# Patient Record
Sex: MALE | Race: WHITE | NOT HISPANIC OR LATINO | Employment: OTHER | URBAN - METROPOLITAN AREA
[De-identification: names, ages, dates, MRNs, and addresses within clinical notes are randomized per-mention and may not be internally consistent; named-entity substitution may affect disease eponyms.]

---

## 2017-01-01 ENCOUNTER — TRANSFERRED RECORDS (OUTPATIENT)
Dept: HEALTH INFORMATION MANAGEMENT | Facility: CLINIC | Age: 62
End: 2017-01-01

## 2017-01-02 ENCOUNTER — TRANSFERRED RECORDS (OUTPATIENT)
Dept: HEALTH INFORMATION MANAGEMENT | Facility: CLINIC | Age: 62
End: 2017-01-02

## 2017-01-04 ENCOUNTER — TRANSFERRED RECORDS (OUTPATIENT)
Dept: HEALTH INFORMATION MANAGEMENT | Facility: CLINIC | Age: 62
End: 2017-01-04

## 2017-01-08 ENCOUNTER — TRANSFERRED RECORDS (OUTPATIENT)
Dept: HEALTH INFORMATION MANAGEMENT | Facility: CLINIC | Age: 62
End: 2017-01-08

## 2017-01-15 ENCOUNTER — TRANSFERRED RECORDS (OUTPATIENT)
Dept: HEALTH INFORMATION MANAGEMENT | Facility: CLINIC | Age: 62
End: 2017-01-15

## 2017-01-20 ENCOUNTER — TRANSFERRED RECORDS (OUTPATIENT)
Dept: HEALTH INFORMATION MANAGEMENT | Facility: CLINIC | Age: 62
End: 2017-01-20

## 2017-02-01 ENCOUNTER — APPOINTMENT (OUTPATIENT)
Dept: CT IMAGING | Facility: CLINIC | Age: 62
End: 2017-02-01
Attending: EMERGENCY MEDICINE
Payer: COMMERCIAL

## 2017-02-01 ENCOUNTER — HOSPITAL ENCOUNTER (EMERGENCY)
Facility: CLINIC | Age: 62
Discharge: HOME | End: 2017-02-01
Attending: EMERGENCY MEDICINE | Admitting: EMERGENCY MEDICINE
Payer: COMMERCIAL

## 2017-02-01 VITALS
TEMPERATURE: 98.2 F | HEART RATE: 83 BPM | BODY MASS INDEX: 29.75 KG/M2 | SYSTOLIC BLOOD PRESSURE: 139 MMHG | RESPIRATION RATE: 19 BRPM | DIASTOLIC BLOOD PRESSURE: 87 MMHG | OXYGEN SATURATION: 99 % | WEIGHT: 190 LBS

## 2017-02-01 DIAGNOSIS — G89.29 CHRONIC ABDOMINAL PAIN: ICD-10-CM

## 2017-02-01 DIAGNOSIS — R10.9 CHRONIC ABDOMINAL PAIN: ICD-10-CM

## 2017-02-01 DIAGNOSIS — Z86.39 HISTORY OF AMYLOIDOSIS: ICD-10-CM

## 2017-02-01 LAB
ABO + RH BLD: NORMAL
ABO + RH BLD: NORMAL
ALBUMIN SERPL-MCNC: 3.6 G/DL (ref 3.4–5)
ALBUMIN UR-MCNC: NEGATIVE MG/DL
ALP SERPL-CCNC: 89 U/L (ref 40–150)
ALT SERPL W P-5'-P-CCNC: 27 U/L (ref 0–70)
ANION GAP SERPL CALCULATED.3IONS-SCNC: 11 MMOL/L (ref 3–14)
APPEARANCE UR: CLEAR
AST SERPL W P-5'-P-CCNC: 12 U/L (ref 0–45)
BASOPHILS # BLD AUTO: 0.1 10E9/L (ref 0–0.2)
BASOPHILS NFR BLD AUTO: 0.5 %
BILIRUB SERPL-MCNC: 1 MG/DL (ref 0.2–1.3)
BILIRUB UR QL STRIP: NEGATIVE
BLD GP AB SCN SERPL QL: NORMAL
BLOOD BANK CMNT PATIENT-IMP: NORMAL
BUN SERPL-MCNC: 14 MG/DL (ref 7–30)
CALCIUM SERPL-MCNC: 8.6 MG/DL (ref 8.5–10.1)
CHLORIDE SERPL-SCNC: 99 MMOL/L (ref 94–109)
CO2 SERPL-SCNC: 22 MMOL/L (ref 20–32)
COLOR UR AUTO: YELLOW
CREAT SERPL-MCNC: 0.7 MG/DL (ref 0.66–1.25)
DIFFERENTIAL METHOD BLD: ABNORMAL
EOSINOPHIL # BLD AUTO: 0.2 10E9/L (ref 0–0.7)
EOSINOPHIL NFR BLD AUTO: 1.6 %
ERYTHROCYTE [DISTWIDTH] IN BLOOD BY AUTOMATED COUNT: 16.9 % (ref 10–15)
GFR SERPL CREATININE-BSD FRML MDRD: ABNORMAL ML/MIN/1.7M2
GLUCOSE SERPL-MCNC: 103 MG/DL (ref 70–99)
GLUCOSE UR STRIP-MCNC: NEGATIVE MG/DL
HCT VFR BLD AUTO: 31.3 % (ref 40–53)
HGB BLD-MCNC: 11.5 G/DL (ref 13.3–17.7)
HGB UR QL STRIP: NEGATIVE
IMM GRANULOCYTES # BLD: 0.1 10E9/L (ref 0–0.4)
IMM GRANULOCYTES NFR BLD: 0.4 %
INR PPP: 1.15 (ref 0.86–1.14)
KETONES UR STRIP-MCNC: NEGATIVE MG/DL
LACTATE BLD-SCNC: 1.1 MMOL/L (ref 0.7–2.1)
LEUKOCYTE ESTERASE UR QL STRIP: NEGATIVE
LIPASE SERPL-CCNC: 89 U/L (ref 73–393)
LYMPHOCYTES # BLD AUTO: 0.9 10E9/L (ref 0.8–5.3)
LYMPHOCYTES NFR BLD AUTO: 8.1 %
MCH RBC QN AUTO: 32.8 PG (ref 26.5–33)
MCHC RBC AUTO-ENTMCNC: 36.7 G/DL (ref 31.5–36.5)
MCV RBC AUTO: 89 FL (ref 78–100)
MONOCYTES # BLD AUTO: 1.5 10E9/L (ref 0–1.3)
MONOCYTES NFR BLD AUTO: 13.2 %
NEUTROPHILS # BLD AUTO: 8.7 10E9/L (ref 1.6–8.3)
NEUTROPHILS NFR BLD AUTO: 76.2 %
NITRATE UR QL: NEGATIVE
NRBC # BLD AUTO: 0 10*3/UL
NRBC BLD AUTO-RTO: 0 /100
PH UR STRIP: 7 PH (ref 5–7)
PLATELET # BLD AUTO: 106 10E9/L (ref 150–450)
POTASSIUM SERPL-SCNC: 3.7 MMOL/L (ref 3.4–5.3)
PROT SERPL-MCNC: 6.1 G/DL (ref 6.8–8.8)
RBC # BLD AUTO: 3.51 10E12/L (ref 4.4–5.9)
RBC #/AREA URNS AUTO: <1 /HPF (ref 0–2)
SODIUM SERPL-SCNC: 132 MMOL/L (ref 133–144)
SP GR UR STRIP: 1.01 (ref 1–1.03)
SPECIMEN EXP DATE BLD: NORMAL
URN SPEC COLLECT METH UR: NORMAL
UROBILINOGEN UR STRIP-MCNC: NORMAL MG/DL (ref 0–2)
WBC # BLD AUTO: 11.4 10E9/L (ref 4–11)
WBC #/AREA URNS AUTO: 1 /HPF (ref 0–2)

## 2017-02-01 PROCEDURE — 86901 BLOOD TYPING SEROLOGIC RH(D): CPT | Performed by: EMERGENCY MEDICINE

## 2017-02-01 PROCEDURE — 99285 EMERGENCY DEPT VISIT HI MDM: CPT | Mod: 25 | Performed by: EMERGENCY MEDICINE

## 2017-02-01 PROCEDURE — 86900 BLOOD TYPING SEROLOGIC ABO: CPT | Performed by: EMERGENCY MEDICINE

## 2017-02-01 PROCEDURE — 25000125 ZZHC RX 250: Performed by: EMERGENCY MEDICINE

## 2017-02-01 PROCEDURE — 96374 THER/PROPH/DIAG INJ IV PUSH: CPT | Mod: 59 | Performed by: EMERGENCY MEDICINE

## 2017-02-01 PROCEDURE — 25000128 H RX IP 250 OP 636: Performed by: EMERGENCY MEDICINE

## 2017-02-01 PROCEDURE — 87086 URINE CULTURE/COLONY COUNT: CPT | Performed by: EMERGENCY MEDICINE

## 2017-02-01 PROCEDURE — 96375 TX/PRO/DX INJ NEW DRUG ADDON: CPT | Mod: 59 | Performed by: EMERGENCY MEDICINE

## 2017-02-01 PROCEDURE — 96376 TX/PRO/DX INJ SAME DRUG ADON: CPT | Performed by: EMERGENCY MEDICINE

## 2017-02-01 PROCEDURE — 86850 RBC ANTIBODY SCREEN: CPT | Performed by: EMERGENCY MEDICINE

## 2017-02-01 PROCEDURE — 83690 ASSAY OF LIPASE: CPT | Performed by: EMERGENCY MEDICINE

## 2017-02-01 PROCEDURE — 25500064 ZZH RX 255 OP 636: Performed by: EMERGENCY MEDICINE

## 2017-02-01 PROCEDURE — 74177 CT ABD & PELVIS W/CONTRAST: CPT

## 2017-02-01 PROCEDURE — 99284 EMERGENCY DEPT VISIT MOD MDM: CPT | Mod: Z6 | Performed by: EMERGENCY MEDICINE

## 2017-02-01 PROCEDURE — 87040 BLOOD CULTURE FOR BACTERIA: CPT | Performed by: EMERGENCY MEDICINE

## 2017-02-01 PROCEDURE — 36415 COLL VENOUS BLD VENIPUNCTURE: CPT | Performed by: EMERGENCY MEDICINE

## 2017-02-01 PROCEDURE — 80053 COMPREHEN METABOLIC PANEL: CPT | Performed by: EMERGENCY MEDICINE

## 2017-02-01 PROCEDURE — 83605 ASSAY OF LACTIC ACID: CPT | Performed by: EMERGENCY MEDICINE

## 2017-02-01 PROCEDURE — 85025 COMPLETE CBC W/AUTO DIFF WBC: CPT | Performed by: EMERGENCY MEDICINE

## 2017-02-01 PROCEDURE — 81001 URINALYSIS AUTO W/SCOPE: CPT | Performed by: EMERGENCY MEDICINE

## 2017-02-01 PROCEDURE — 85610 PROTHROMBIN TIME: CPT | Performed by: EMERGENCY MEDICINE

## 2017-02-01 RX ORDER — ONDANSETRON 2 MG/ML
4 INJECTION INTRAMUSCULAR; INTRAVENOUS ONCE
Status: COMPLETED | OUTPATIENT
Start: 2017-02-01 | End: 2017-02-01

## 2017-02-01 RX ORDER — MORPHINE SULFATE 4 MG/ML
4 INJECTION, SOLUTION INTRAMUSCULAR; INTRAVENOUS ONCE
Status: COMPLETED | OUTPATIENT
Start: 2017-02-01 | End: 2017-02-01

## 2017-02-01 RX ORDER — IOPAMIDOL 755 MG/ML
100 INJECTION, SOLUTION INTRAVASCULAR ONCE
Status: COMPLETED | OUTPATIENT
Start: 2017-02-01 | End: 2017-02-01

## 2017-02-01 RX ADMIN — ONDANSETRON 4 MG: 2 INJECTION INTRAMUSCULAR; INTRAVENOUS at 18:34

## 2017-02-01 RX ADMIN — HYDROMORPHONE HYDROCHLORIDE 1 MG: 1 INJECTION, SOLUTION INTRAMUSCULAR; INTRAVENOUS; SUBCUTANEOUS at 20:19

## 2017-02-01 RX ADMIN — MORPHINE SULFATE 4 MG: 4 INJECTION, SOLUTION INTRAMUSCULAR; INTRAVENOUS at 18:36

## 2017-02-01 RX ADMIN — HYDROMORPHONE HYDROCHLORIDE 1 MG: 1 INJECTION, SOLUTION INTRAMUSCULAR; INTRAVENOUS; SUBCUTANEOUS at 19:17

## 2017-02-01 RX ADMIN — SODIUM CHLORIDE 63 ML: 9 INJECTION, SOLUTION INTRAVENOUS at 20:08

## 2017-02-01 RX ADMIN — SODIUM CHLORIDE 1000 ML: 9 INJECTION, SOLUTION INTRAVENOUS at 18:34

## 2017-02-01 RX ADMIN — IOPAMIDOL 100 ML: 755 INJECTION, SOLUTION INTRAVENOUS at 20:08

## 2017-02-01 ASSESSMENT — ENCOUNTER SYMPTOMS
NAUSEA: 1
VOMITING: 0
HEMATURIA: 0
SORE THROAT: 0
DIARRHEA: 1
DYSURIA: 0
BLOOD IN STOOL: 1
RHINORRHEA: 0
FEVER: 0
ABDOMINAL PAIN: 1
COUGH: 0

## 2017-02-01 NOTE — ED AVS SNAPSHOT
[facility], [place], Emergency Department    [address]    [facility] [address]    Phone:  [phone]    Fax:  [phone]                                       Parmjit Hernández   MRN: [id number]    Department:  [facility], Emergency Department   Date of Visit:  2/1/2017           After Visit Summary Signature Page     I have received my discharge instructions, and my questions have been answered. I have discussed any challenges I see with this plan with the nurse or doctor.    ..........................................................................................................................................  Patient/Patient Representative Signature      ..........................................................................................................................................  Patient Representative Print Name and Relationship to Patient    ..................................................               ................................................  Date                                            Time    ..........................................................................................................................................  Reviewed by Signature/Title    ...................................................              ..............................................  Date                                                            Time

## 2017-02-01 NOTE — ED PROVIDER NOTES
History     Chief Complaint   Patient presents with     Other     finished chemo therapy last week, + stomach pain, tarry stools; pt was advised to come to ED     HPI  Parmjit Hernández is a 61 year old male chronic abdominal pain secondary to systemic light chain amyloidosis (treated at [facility]), status-post cholecystectomy and umbilical hernia repair who presents with abdominal pain. The patient reports that he completed chemotherapy about 10 days ago at [facility] for his amyloidosis. He also reports that he had a stem cell transplant about a week ago. Over the past week, he has developed constant, crampy, right-sided abdominal pain with associated nausea and loose stool. He notes that he has had 1 episode of paste-like, dark tarry stool over the past week. He has never had this in the past. He notes that he has not been able to eat, even though he has an appetite, and has lost about 20 pounds over the past 10 days. He denies any history of ulcers. He reports a colonoscopy about 6 months ago and an endoscopy last summer that were negative. He denies fever, cough, cold, runny nose, sore throat, vomiting, dysuria, hematuria, nose or gum bleeding. He has been taking prochlorperazine at home for the nausea.    Record review shows hx of chronic abdominal pain and narcotic use.    Past Medical History   Diagnosis Date     Headache(784.0)      Obsessive-compulsive disorders      Other acquired absence of organ 94     Other specified viral warts      Pure hypercholesterolemia      Myalgia and myositis, unspecified      Hypertension 2007     Diabetes mellitus (H)      type 2     Pain in the abdomen      Allergic state      Amyloidosis (H)        Past Surgical History   Procedure Laterality Date     C nonspecific procedure  94     Cholecystectomy     Hernia repair, umbilical  2006     C nonspecific procedure  2000     repair deviated septum     Cholecystectomy  1995     lap qian     Colonoscopy  2012     hx polyps      Esophagoscopy, gastroscopy, duodenoscopy (egd), combined N/A 5/29/2015     Procedure: COMBINED ESOPHAGOSCOPY, GASTROSCOPY, DUODENOSCOPY (EGD), BIOPSY SINGLE OR MULTIPLE;  Surgeon: John Jacob MD;  Location:  GI     Bone marrow biopsy, bone specimen, needle/trocar N/A 6/8/2016     Procedure: BIOPSY BONE MARROW;  Surgeon: Nathan Agrawal MD;  Location:  GI       Family History   Problem Relation Age of Onset     CEREBROVASCULAR DISEASE Mother      C.A.D. Mother      Hypertension Mother      Alcohol/Drug Mother      Arthritis Mother      CEREBROVASCULAR DISEASE Maternal Grandmother      C.A.D. Maternal Grandmother      Alcohol/Drug Maternal Grandmother      C.A.D. Father      HEART DISEASE Father      Hypertension Father      Alcohol/Drug Father      Allergies Father      Circulatory Father      Depression Father      Respiratory Father      DIABETES Brother      Alcohol/Drug Paternal Grandfather      CEREBROVASCULAR DISEASE Paternal Grandfather      Allergies Sister      Depression Sister      Gynecology Sister      Depression Son      Psychotic Disorder Son      anxiety disorder     Depression Daughter      CEREBROVASCULAR DISEASE Maternal Grandfather      CEREBROVASCULAR DISEASE Paternal Grandmother      Coronary Artery Disease No family hx of      Hyperlipidemia No family hx of      Breast Cancer No family hx of      Colon Cancer No family hx of      Prostate Cancer No family hx of      Other Cancer No family hx of      Anxiety Disorder No family hx of      MENTAL ILLNESS No family hx of      Substance Abuse No family hx of      Anesthesia Reaction No family hx of      Asthma No family hx of      OSTEOPOROSIS No family hx of      Genetic Disorder No family hx of      Thyroid Disease No family hx of      Obesity No family hx of      Unknown/Adopted No family hx of        Social History   Substance Use Topics     Smoking status: Never Smoker      Smokeless tobacco: Never Used     Alcohol Use:  No      Comment: rarely     No current facility-administered medications for this encounter.     Current Outpatient Prescriptions   Medication     hydrOXYzine (VISTARIL) 25 MG capsule     atorvastatin (LIPITOR) 10 MG tablet     metFORMIN (GLUCOPHAGE-XR) 500 MG 24 hr tablet     lisinopril (PRINIVIL,ZESTRIL) 20 MG tablet     DEXAMETHASONE PO     oxyCODONE (ROXICODONE) 5 MG immediate release tablet     oxyCODONE (ROXICODONE) 5 MG immediate release tablet     OLANZapine (ZYPREXA) 5 MG tablet     order for DME     clonazePAM (KLONOPIN) 0.5 MG disintegrating tablet     zolpidem (AMBIEN) 5 MG tablet     bortezomib (VELCADE) 3.5 MG injection     UNKNOWN MED DOSAGE     traZODone (DESYREL) 100 MG tablet     lamoTRIgine (LAMICTAL) 200 MG tablet        Allergies   Allergen Reactions     Sulfa Drugs Swelling     Pt has taken  Taken sulfa drugs orally without trouble. He had problems with Sulfa eye drops. Eye swelled up     Victoza      Increased migraine frequency and severity      I have reviewed the Medications, Allergies, Past Medical and Surgical History, and Social History in the Epic system.    Review of Systems   Constitutional: Negative for fever.   HENT: Negative for mouth sores, nosebleeds, rhinorrhea and sore throat.    Respiratory: Negative for cough.    Gastrointestinal: Positive for nausea, abdominal pain, diarrhea (loose) and blood in stool (melena). Negative for vomiting.   Genitourinary: Negative for dysuria and hematuria.   All other systems reviewed and are negative.      Physical Exam   BP: (!) 147/103 mmHg  Pulse: 96  Temp: 95.4  F (35.2  C)  Resp: 16  Weight: 86.183 kg (190 lb)  SpO2: 99 %  Physical Exam   Constitutional: He is oriented to person, place, and time. He appears well-developed and well-nourished.   Lying on L side, in dark room, blanket covering head, appears to have intermittent facial tics   HENT:   Head: Normocephalic.   Mouth/Throat: Oropharynx is clear and moist.   Eyes: Pupils are equal,  round, and reactive to light.   Neck: Neck supple.   Cardiovascular: Normal rate, regular rhythm and normal heart sounds.  Exam reveals no gallop.    No murmur heard.  Pulmonary/Chest: Effort normal and breath sounds normal. No respiratory distress. He has no wheezes. He has no rales.   Abdominal: Soft. Bowel sounds are normal. He exhibits no distension. There is no tenderness. There is no rebound and no guarding.   Obese, soft, diffusely TTP globally without guarding or rebound. Normal bowel sounds   Neurological: He is alert and oriented to person, place, and time.   Skin: Skin is warm and dry.   Psychiatric:   anxious   Nursing note and vitals reviewed.      ED Course     Procedures     5:08 PM  The patient was seen and examined by Dr. Schultz in Room 2.               Critical Care time:  none               Results for orders placed or performed during the hospital encounter of 02/01/17 (from the past 24 hour(s))   CBC with platelets differential   Result Value Ref Range    WBC 11.4 (H) 4.0 - 11.0 10e9/L    RBC Count 3.51 (L) 4.4 - 5.9 10e12/L    Hemoglobin 11.5 (L) 13.3 - 17.7 g/dL    Hematocrit 31.3 (L) 40.0 - 53.0 %    MCV 89 78 - 100 fl    MCH 32.8 26.5 - 33.0 pg    MCHC 36.7 (H) 31.5 - 36.5 g/dL    RDW 16.9 (H) 10.0 - 15.0 %    Platelet Count 106 (L) 150 - 450 10e9/L    Diff Method Automated Method     % Neutrophils 76.2 %    % Lymphocytes 8.1 %    % Monocytes 13.2 %    % Eosinophils 1.6 %    % Basophils 0.5 %    % Immature Granulocytes 0.4 %    Nucleated RBCs 0 0 /100    Absolute Neutrophil 8.7 (H) 1.6 - 8.3 10e9/L    Absolute Lymphocytes 0.9 0.8 - 5.3 10e9/L    Absolute Monocytes 1.5 (H) 0.0 - 1.3 10e9/L    Absolute Eosinophils 0.2 0.0 - 0.7 10e9/L    Absolute Basophils 0.1 0.0 - 0.2 10e9/L    Abs Immature Granulocytes 0.1 0 - 0.4 10e9/L    Absolute Nucleated RBC 0.0    Comprehensive metabolic panel   Result Value Ref Range    Sodium 132 (L) 133 - 144 mmol/L    Potassium 3.7 3.4 - 5.3 mmol/L    Chloride 99  94 - 109 mmol/L    Carbon Dioxide 22 20 - 32 mmol/L    Anion Gap 11 3 - 14 mmol/L    Glucose 103 (H) 70 - 99 mg/dL    Urea Nitrogen 14 7 - 30 mg/dL    Creatinine 0.70 0.66 - 1.25 mg/dL    GFR Estimate >90  Non  GFR Calc   >60 mL/min/1.7m2    GFR Estimate If Black >90   GFR Calc   >60 mL/min/1.7m2    Calcium 8.6 8.5 - 10.1 mg/dL    Bilirubin Total 1.0 0.2 - 1.3 mg/dL    Albumin 3.6 3.4 - 5.0 g/dL    Protein Total 6.1 (L) 6.8 - 8.8 g/dL    Alkaline Phosphatase 89 40 - 150 U/L    ALT 27 0 - 70 U/L    AST 12 0 - 45 U/L   INR   Result Value Ref Range    INR 1.15 (H) 0.86 - 1.14   Lipase   Result Value Ref Range    Lipase 89 73 - 393 U/L   Lactic acid   Result Value Ref Range    Lactic Acid 1.1 0.7 - 2.1 mmol/L   Blood culture ONE site   Result Value Ref Range    Specimen Description Blood Left Hand     Special Requests Aerobic and anaerobic bottles received     Culture Micro Pending     Micro Report Status Pending    ABO/Rh type and screen   Result Value Ref Range    ABO A     RH(D)  Pos     Antibody Screen Neg     Test Valid Only At       [facility]    Specimen Expires 02/04/2017    UA with Microscopic   Result Value Ref Range    Color Urine Yellow     Appearance Urine Clear     Glucose Urine Negative NEG mg/dL    Bilirubin Urine Negative NEG    Ketones Urine Negative NEG mg/dL    Specific Gravity Urine 1.011 1.003 - 1.035    Blood Urine Negative NEG    pH Urine 7.0 5.0 - 7.0 pH    Protein Albumin Urine Negative NEG mg/dL    Urobilinogen mg/dL Normal 0.0 - 2.0 mg/dL    Nitrite Urine Negative NEG    Leukocyte Esterase Urine Negative NEG    Source Midstream Urine     WBC Urine 1 0 - 2 /HPF    RBC Urine <1 0 - 2 /HPF   Urine Culture Aerobic Bacterial   Result Value Ref Range    Specimen Description Midstream Urine     Special Requests Specimen received in preservative     Culture Micro Pending     Micro Report Status Pending    CT Abdomen Pelvis  w Contrast    Narrative    CT ABDOMEN AND PELVIS WITH CONTRAST   2/1/2017 8:08 PM      HISTORY: Abdominal pain and nausea. History of amyloidosis post stem  cell transplant a few weeks ago.    TECHNIQUE: CT abdomen and pelvis with intravenous contrast. Radiation  dose for this scan was reduced using automated exposure control,  adjustment of the mA and/or kV according to patient size, or iterative  reconstruction technique. 100 mL Isovue-370.     COMPARISON: None.    FINDINGS:  Abdomen: There is mild atelectasis and scarring at the lung bases. 0.4  cm indeterminate nodule right lower lobe laterally. The spleen is  enlarged measuring approximately 14.5 cm in length. The liver is  slightly low in attenuation diffusely consistent with fatty  infiltration. No focal lesion. The gallbladder is surgically absent.  The pancreas, adrenal glands and right kidney are normal in  appearance. There are three small cortical cysts in the left kidney.  There are several enlarged upper abdominal and retroperitoneal lymph  nodes. Lymph node in the jaguar hepatis measures 3.7 x 1.8 cm. There  are a few enlarged lymph nodes in the pelvis and inguinal regions  bilaterally. There is atherosclerotic calcification of aorta and its  branches. No aneurysm.    Pelvis: There are scattered colonic diverticula without acute  diverticulitis. No bowel obstruction or inflammation. The appendix is  within normal limits. Specifically, no evidence of typhlitis or  colitis. No free intraperitoneal gas or fluid. Degenerative disease in  the spine.      Impression    IMPRESSION:  1. No bowel obstruction or inflammation.  2. Multiple mildly enlarged abdominal and pelvic lymph nodes.  3. Splenomegaly.    THUY POWERS MD         Assessments & Plan (with Medical Decision Making)   This is a 61 year old male, who primarily gets most of his specialty care at the [facility], who presents to the ED today with abdominal pain and report of dark stools.  Patient  has a history of amyloidosis and evidently received a stem cell transplant at [facility] sometime in January.  He is unclear about exactly when.  He states that since he was discharged he has been noticing increasing crampy abdominal pain with some nausea but no vomiting.    He has some records through the [facility] system but most of his oncology care has all been outside of our system and I do not have access to [facility] records at this point.  Certainly in a patient who is so recently status post stem cell transplant, need to consider the possibility of significant complications.  He reports he did finish chemotherapy just before that started, but can t tell me what chemo drugs he was on.  Here in the emergency department he is a bit hypertensive with a blood pressure 147/103, pulse and respiratory rate were normal and temp was 95.6F. He is lying in a darkened room, on his left side with a blanket covering his head.  He does have some tenderness to palpation somewhat generally over the entire abdomen, not really complaining of any specific tenderness to palpation.    Multiple etiologies of his symptoms are possible.  In somebody who is so recently status post stem cell transplant, need to consider the possibility of typhlitis.  Any other intra-abdominal process is possible including hepatitis, pancreatitis, gastritis, peptic ulcer disease.  Unclear to me if this  dark tarry stool  really represents blood, I wish to avoid a rectal exam at this point as I do not yet know what his white count is and if he is neutropenic, wish to avoid rectal exams.    We did establish IV access and we did draw blood for laboratory analysis. CBC actually looks quite good for a patient who had a stem cell transplant a few weeks ago.  White count is 11.4, hemoglobin is 11.5, platelet count is 106,000.  Comprehensive metabolic panel shows a sodium of 132, otherwise within normal limits, protein is down at 6.1 which is not unexpected.  INR  is 1.15 and lipase is normal at 89, lactate normal at 1.1.  UA shows no evidence of infection. Urine and blood cultures were obtained.  We did give the patient IV fluids as well as a dose of Zofran and a dose of morphine.  Abdominal CT scan was done and this shows no evidence of bowel obstruction or inflammation, specifically no sign of typhlitis or colitis.  There is multiple mildly enlarged abdominal and pelvic lymph nodes as well as splenomegaly, not at all uncommon for amyloidosis.    Further record review through our system shows that he does have a history of chronic abdominal pain and chronic narcotic use.  He states that right now he is not any narcotics whatsoever.    I suspect that this is the patient s chronic abdominal pain.  As previously mentioned he has been noted to have chronic pain for quite a while.  He told me he wasn t taking anything for pain at home, this turns out to be untrue as he filled a prescription for OxyContin about a week ago, 10 mg tablets, 60 tablets were dispensed.  Looking at his [place] Prescription Monitoring Database, 65 prescriptions for controlled substances in the past 12 months from 13 providers and 9 different pharmacies.  I did tell him that I am not comfortable managing chronic pain from the Emergency Department and as his numbers look quite good, I do think it is reasonable to discharge him, I would suggest that he contact his clinic in the morning to discuss symptoms but at this point I see no reason that he would need to be admitted and urgently transferred for care.  Patient continued to repeatedly bargain with me for narcotics.  He has received 3 doses of IV narcotics in the Emergency Department.  Again, without an acute emergency present I do not think it is appropriate for us to give additional narcotics in the ED and I would strongly recommend that he follow up with his primary clinic.  Patient verbalizes understanding.    I have reviewed the nursing  notes.    I have reviewed the findings, diagnosis, plan and need for follow up with the patient.  This part of the document was transcribed by Marivel De La Fuente for Mariah Schultz MD.  Discharge Medication List as of 2/1/2017  9:04 PM          Final diagnoses:   Chronic abdominal pain   History of amyloidosis     I, Andrew Huddleston, am serving as a trained medical scribe to document services personally performed by Mariah Schultz MD, based on the provider's statements to me.   I, Mariah Schultz MD, was physically present and have reviewed and verified the accuracy of this note documented by Andrew Huddleston.     2/1/2017   [facility], [place], EMERGENCY DEPARTMENT      Mariah Schultz MD  02/02/17 0207

## 2017-02-01 NOTE — ED AVS SNAPSHOT
[facility], Emergency Department    [address]    [place] [address]    Phone:  [phone]    Fax:  [phone]                                       Parmjit Hernández   MRN: [id number]    Department:  [facility], Emergency Department   Date of Visit:  2/1/2017           Patient Information     Date Of Birth          1955        Your diagnoses for this visit were:     Chronic abdominal pain     History of amyloidosis        You were seen by Mariah Schultz MD.        Discharge Instructions       You have been seen in the ER for your chronic abdominal pain.  We have done extensive testing here in the ER and your blood and urine tests look quite good.  Your CT scan shows some lymph nodes, which is not at all unexpected.  We recommend that you call your clinic and discuss your symptoms with them.  Continue taking your regular medications as directed.    Discharge References/Attachments     CHRONIC PAIN (ENGLISH)      24 Hour Appointment Hotline       To make an appointment at any [facility] clinic, call [phone] ([phone]). If you don't have a family doctor or clinic, we will help you find one. [facility] clinics are conveniently located to serve the needs of you and your family.             Review of your medicines      Our records show that you are taking the medicines listed below. If these are incorrect, please call your family doctor or clinic.        Dose / Directions Last dose taken    atorvastatin 10 MG tablet   Commonly known as:  LIPITOR   Quantity:  90 tablet        TAKE ONE TABLET BY MOUTH ONE TIME DAILY   Refills:  2        clonazePAM 0.5 MG ODT tab   Commonly known as:  klonoPIN   Quantity:  10 tablet        2 at hs   Refills:  0        DEXAMETHASONE PO        Take by mouth once a week   Refills:  0        hydrOXYzine 25 MG capsule   Commonly known as:  VISTARIL   Dose:  25 mg   Quantity:  50 capsule        Take 1 capsule (25 mg) by mouth 3 times daily as needed for  itching   Refills:  0        lamoTRIgine 200 MG tablet   Commonly known as:  LaMICtal   Dose:  100 mg   Quantity:  30 tablet        Take 100 mg by mouth 2 times daily   Refills:  1        lisinopril 20 MG tablet   Commonly known as:  PRINIVIL/ZESTRIL   Quantity:  90 tablet        TAKE ONE TABLET BY MOUTH ONE TIME DAILY   Refills:  3        metFORMIN 500 MG 24 hr tablet   Commonly known as:  GLUCOPHAGE-XR   Quantity:  360 tablet        TAKE FOUR TABLETS BY MOUTH DAILY   Refills:  PRN        OLANZapine 5 MG tablet   Commonly known as:  zyPREXA   Quantity:  49 tablet        15mg at dinner time daily   Refills:  0        order for DME   Quantity:  1 Device        Equipment being ordered: CPAP   Refills:  0        * oxyCODONE 5 MG IR tablet   Commonly known as:  ROXICODONE   Quantity:  70 tablet        1-2 q 6 hrs prn pain. Maximum of 10 a day   Refills:  0        * oxyCODONE 5 MG IR tablet   Commonly known as:  ROXICODONE   Quantity:  70 tablet        1 q 4 hr prn pain up to 10 a day   Refills:  0        traZODone 100 MG tablet   Commonly known as:  DESYREL   Dose:  200 mg   Quantity:  90 tablet        Take 2 tablets (200 mg) by mouth At Bedtime   Refills:  3        UNKNOWN MED DOSAGE   Indication:  chemotherapeutic medication weekly - 12 pills        Refills:  0        VELCADE 3.5 MG injection   Quantity:  3 each   Generic drug:  bortezomib        Inject into the vein once   Refills:  0        zolpidem 5 MG tablet   Commonly known as:  AMBIEN   Dose:  5 mg   Quantity:  14 tablet        Take 1 tablet (5 mg) by mouth nightly as needed for sleep   Refills:  0        * Notice:  This list has 2 medication(s) that are the same as other medications prescribed for you. Read the directions carefully, and ask your doctor or other care provider to review them with you.            Procedures and tests performed during your visit     ABO/Rh type and screen    Blood culture ONE site    CBC with platelets differential    CT Abdomen  Pelvis w Contrast    Comprehensive metabolic panel    INR    Lactic acid    Lipase    Pulse oximetry    UA with Microscopic    Urine Culture Aerobic Bacterial      Orders Needing Specimen Collection     None      Pending Results     Date and Time Order Name Status Description    2/1/2017 1723 CT Abdomen Pelvis w Contrast Preliminary     2/1/2017 1722 Urine Culture Aerobic Bacterial In process     2/1/2017 1722 Blood culture ONE site In process             Pending Culture Results     Date and Time Order Name Status Description    2/1/2017 1722 Urine Culture Aerobic Bacterial In process     2/1/2017 1722 Blood culture ONE site In process             Thank you for choosing [facility]       Thank you for choosing [facility] for your care. Our goal is always to provide you with excellent care. Hearing back from our patients is one way we can continue to improve our services. Please take a few minutes to complete the written survey that you may receive in the mail after you visit with us. Thank you!        [employer] Information     [employer] gives you secure access to your electronic health record. If you see a primary care provider, you can also send messages to your care team and make appointments. If you have questions, please call your primary care clinic.  If you do not have a primary care provider, please call [phone] and they will assist you.        Care EveryWhere ID     This is your Care EveryWhere ID. This could be used by other organizations to access your [facility] medical records  [id number]        After Visit Summary       This is your record. Keep this with you and show to your community pharmacist(s) and doctor(s) at your next visit.

## 2017-02-02 LAB
BACTERIA SPEC CULT: NO GROWTH
Lab: NORMAL
MICRO REPORT STATUS: NORMAL
SPECIMEN SOURCE: NORMAL

## 2017-02-07 LAB
BACTERIA SPEC CULT: NO GROWTH
Lab: NORMAL
MICRO REPORT STATUS: NORMAL
SPECIMEN SOURCE: NORMAL

## 2017-02-14 ENCOUNTER — TRANSFERRED RECORDS (OUTPATIENT)
Dept: HEALTH INFORMATION MANAGEMENT | Facility: CLINIC | Age: 62
End: 2017-02-14

## 2017-02-19 ENCOUNTER — HOSPITAL ENCOUNTER (EMERGENCY)
Facility: CLINIC | Age: 62
Discharge: HOME | End: 2017-02-19
Attending: INTERNAL MEDICINE | Admitting: INTERNAL MEDICINE
Payer: COMMERCIAL

## 2017-02-19 ENCOUNTER — APPOINTMENT (OUTPATIENT)
Dept: GENERAL RADIOLOGY | Facility: CLINIC | Age: 62
End: 2017-02-19
Attending: INTERNAL MEDICINE
Payer: COMMERCIAL

## 2017-02-19 VITALS
TEMPERATURE: 98.1 F | HEIGHT: 67 IN | DIASTOLIC BLOOD PRESSURE: 100 MMHG | SYSTOLIC BLOOD PRESSURE: 153 MMHG | WEIGHT: 195 LBS | HEART RATE: 80 BPM | RESPIRATION RATE: 18 BRPM | OXYGEN SATURATION: 100 % | BODY MASS INDEX: 30.61 KG/M2

## 2017-02-19 DIAGNOSIS — E85.89 OTHER AMYLOIDOSIS (H): ICD-10-CM

## 2017-02-19 DIAGNOSIS — R10.9 CHRONIC ABDOMINAL PAIN: ICD-10-CM

## 2017-02-19 DIAGNOSIS — G89.29 CHRONIC ABDOMINAL PAIN: ICD-10-CM

## 2017-02-19 LAB
ALBUMIN SERPL-MCNC: 3.8 G/DL (ref 3.4–5)
ALBUMIN UR-MCNC: NEGATIVE MG/DL
ALP SERPL-CCNC: 107 U/L (ref 40–150)
ALT SERPL W P-5'-P-CCNC: 31 U/L (ref 0–70)
AMPHETAMINES UR QL SCN: NORMAL
ANION GAP SERPL CALCULATED.3IONS-SCNC: 13 MMOL/L (ref 3–14)
APPEARANCE UR: CLEAR
AST SERPL W P-5'-P-CCNC: 16 U/L (ref 0–45)
BARBITURATES UR QL: NORMAL
BASOPHILS # BLD AUTO: 0 10E9/L (ref 0–0.2)
BASOPHILS NFR BLD AUTO: 0.1 %
BENZODIAZ UR QL: NORMAL
BILIRUB SERPL-MCNC: 1.5 MG/DL (ref 0.2–1.3)
BILIRUB UR QL STRIP: NEGATIVE
BUN SERPL-MCNC: 10 MG/DL (ref 7–30)
CALCIUM SERPL-MCNC: 9.1 MG/DL (ref 8.5–10.1)
CANNABINOIDS UR QL SCN: NORMAL
CHLORIDE SERPL-SCNC: 99 MMOL/L (ref 94–109)
CO2 SERPL-SCNC: 22 MMOL/L (ref 20–32)
COCAINE UR QL: NORMAL
COLOR UR AUTO: ABNORMAL
CREAT SERPL-MCNC: 0.69 MG/DL (ref 0.66–1.25)
CRP SERPL-MCNC: 8.9 MG/L (ref 0–8)
DIFFERENTIAL METHOD BLD: ABNORMAL
EOSINOPHIL # BLD AUTO: 0.1 10E9/L (ref 0–0.7)
EOSINOPHIL NFR BLD AUTO: 1.1 %
ERYTHROCYTE [DISTWIDTH] IN BLOOD BY AUTOMATED COUNT: 16.1 % (ref 10–15)
ERYTHROCYTE [SEDIMENTATION RATE] IN BLOOD BY WESTERGREN METHOD: 16 MM/H (ref 0–20)
ETHANOL UR QL SCN: NORMAL
GFR SERPL CREATININE-BSD FRML MDRD: ABNORMAL ML/MIN/1.7M2
GLUCOSE BLDC GLUCOMTR-MCNC: 126 MG/DL (ref 70–99)
GLUCOSE SERPL-MCNC: 108 MG/DL (ref 70–99)
GLUCOSE UR STRIP-MCNC: 30 MG/DL
HCT VFR BLD AUTO: 35.4 % (ref 40–53)
HGB BLD-MCNC: 12.8 G/DL (ref 13.3–17.7)
HGB UR QL STRIP: NEGATIVE
IMM GRANULOCYTES # BLD: 0 10E9/L (ref 0–0.4)
IMM GRANULOCYTES NFR BLD: 0.2 %
INR PPP: 1.08 (ref 0.86–1.14)
KETONES UR STRIP-MCNC: 10 MG/DL
LEUKOCYTE ESTERASE UR QL STRIP: NEGATIVE
LIPASE SERPL-CCNC: 108 U/L (ref 73–393)
LYMPHOCYTES # BLD AUTO: 0.8 10E9/L (ref 0.8–5.3)
LYMPHOCYTES NFR BLD AUTO: 9.5 %
MCH RBC QN AUTO: 32.2 PG (ref 26.5–33)
MCHC RBC AUTO-ENTMCNC: 36.2 G/DL (ref 31.5–36.5)
MCV RBC AUTO: 89 FL (ref 78–100)
MONOCYTES # BLD AUTO: 0.3 10E9/L (ref 0–1.3)
MONOCYTES NFR BLD AUTO: 4.2 %
NEUTROPHILS # BLD AUTO: 6.8 10E9/L (ref 1.6–8.3)
NEUTROPHILS NFR BLD AUTO: 84.9 %
NITRATE UR QL: NEGATIVE
NRBC # BLD AUTO: 0 10*3/UL
NRBC BLD AUTO-RTO: 0 /100
OPIATES UR QL SCN: NORMAL
PH UR STRIP: 8 PH (ref 5–7)
PLATELET # BLD AUTO: 87 10E9/L (ref 150–450)
POTASSIUM SERPL-SCNC: 3.6 MMOL/L (ref 3.4–5.3)
PROT SERPL-MCNC: 6.6 G/DL (ref 6.8–8.8)
RBC # BLD AUTO: 3.97 10E12/L (ref 4.4–5.9)
RBC #/AREA URNS AUTO: <1 /HPF (ref 0–2)
SODIUM SERPL-SCNC: 134 MMOL/L (ref 133–144)
SP GR UR STRIP: 1.01 (ref 1–1.03)
URN SPEC COLLECT METH UR: ABNORMAL
UROBILINOGEN UR STRIP-MCNC: NORMAL MG/DL (ref 0–2)
WBC # BLD AUTO: 8 10E9/L (ref 4–11)
WBC #/AREA URNS AUTO: <1 /HPF (ref 0–2)

## 2017-02-19 PROCEDURE — 85652 RBC SED RATE AUTOMATED: CPT | Performed by: INTERNAL MEDICINE

## 2017-02-19 PROCEDURE — 86140 C-REACTIVE PROTEIN: CPT | Performed by: INTERNAL MEDICINE

## 2017-02-19 PROCEDURE — 99284 EMERGENCY DEPT VISIT MOD MDM: CPT | Mod: 25

## 2017-02-19 PROCEDURE — 80307 DRUG TEST PRSMV CHEM ANLYZR: CPT | Performed by: INTERNAL MEDICINE

## 2017-02-19 PROCEDURE — 85610 PROTHROMBIN TIME: CPT | Performed by: INTERNAL MEDICINE

## 2017-02-19 PROCEDURE — 80053 COMPREHEN METABOLIC PANEL: CPT | Performed by: INTERNAL MEDICINE

## 2017-02-19 PROCEDURE — 99284 EMERGENCY DEPT VISIT MOD MDM: CPT | Mod: Z6 | Performed by: INTERNAL MEDICINE

## 2017-02-19 PROCEDURE — 96375 TX/PRO/DX INJ NEW DRUG ADDON: CPT

## 2017-02-19 PROCEDURE — 80320 DRUG SCREEN QUANTALCOHOLS: CPT | Performed by: INTERNAL MEDICINE

## 2017-02-19 PROCEDURE — 81003 URINALYSIS AUTO W/O SCOPE: CPT | Performed by: INTERNAL MEDICINE

## 2017-02-19 PROCEDURE — 85025 COMPLETE CBC W/AUTO DIFF WBC: CPT | Performed by: INTERNAL MEDICINE

## 2017-02-19 PROCEDURE — 25000128 H RX IP 250 OP 636: Performed by: INTERNAL MEDICINE

## 2017-02-19 PROCEDURE — 00000146 ZZHCL STATISTIC GLUCOSE BY METER IP

## 2017-02-19 PROCEDURE — 74020 XR ABDOMEN 2 VW: CPT

## 2017-02-19 PROCEDURE — 83690 ASSAY OF LIPASE: CPT | Performed by: INTERNAL MEDICINE

## 2017-02-19 PROCEDURE — 96374 THER/PROPH/DIAG INJ IV PUSH: CPT

## 2017-02-19 PROCEDURE — 96361 HYDRATE IV INFUSION ADD-ON: CPT | Mod: 59

## 2017-02-19 RX ORDER — METOCLOPRAMIDE HYDROCHLORIDE 5 MG/ML
10 INJECTION INTRAMUSCULAR; INTRAVENOUS ONCE
Status: COMPLETED | OUTPATIENT
Start: 2017-02-19 | End: 2017-02-19

## 2017-02-19 RX ORDER — KETOROLAC TROMETHAMINE 30 MG/ML
30 INJECTION, SOLUTION INTRAMUSCULAR; INTRAVENOUS ONCE
Status: COMPLETED | OUTPATIENT
Start: 2017-02-19 | End: 2017-02-19

## 2017-02-19 RX ADMIN — METOCLOPRAMIDE 10 MG: 5 INJECTION, SOLUTION INTRAMUSCULAR; INTRAVENOUS at 07:32

## 2017-02-19 RX ADMIN — KETOROLAC TROMETHAMINE 30 MG: 30 INJECTION, SOLUTION INTRAMUSCULAR at 07:32

## 2017-02-19 RX ADMIN — SODIUM CHLORIDE 1000 ML: 9 INJECTION, SOLUTION INTRAVENOUS at 07:31

## 2017-02-19 ASSESSMENT — ENCOUNTER SYMPTOMS
FEVER: 0
SHORTNESS OF BREATH: 0
ABDOMINAL PAIN: 1

## 2017-02-19 NOTE — DISCHARGE INSTRUCTIONS
Chronic Pain  Pain serves an important role. It lets you know something is wrong that needs your attention. When the body heals, pain normally goes away.  When pain lasts longer than six months, it is called  chronic  pain. This is pain that is present even after the body has healed. Chronic pain can cause mood problems and get in the way of your relationships and your daily life.  A number of conditions can cause chronic pain. Some of the more common include:    Previous surgery    An old injury    Infection    Diseases such as diabetes    Nerve damage    Back injury    Arthritis    Migraine or other headaches    Fibromyalgia    Cancer  Depression and stress can make chronic pain symptoms worse. In some cases, a cause for the pain cannot be found.   Treatment  Treatment can greatly reduce pain. In many cases, pain can become less severe, occur less often, and interfere less with your daily life. Chronic pain is often treated with a combination of medicines, therapies, and lifestyle changes. You will work closely with your healthcare provider to find a treatment plan that works best for you.    Ask your healthcare provider for a referral to a pain management specialty center. These can provide the most recent and proven pain management strategies, along with emotional support and comprehensive services.    Several different types of medicines may be prescribed for chronic pain. Work with your healthcare provider to develop a medicine plan that helps manage your pain.    Physical therapy can be very effective in helping reduce certain types of chronic pain.    Occupational therapy teaches you how to do routine tasks of daily living in ways that lessen your discomfort.    Psychological therapy can help you cope better with stress and pain.    Other therapies such as meditation, yoga, biofeedback, massage, and acupuncture can also help manage chronic pain.    Changing certain habits can help reduce chronic pain. They  include:    Eating healthy    Developing an exercise routine    Getting enough sleep at night    Stopping smoking and limiting alcohol use    Losing excess weight  Follow-up care  Follow up with your healthcare provider as advised. Let your healthcare provider know if your current treatment plan is working or if changes are needed.  Resources  For more information, contact:    [employer] [place] [employer] www.achenet.org    American Chronic Pain Association www.theacpa.org 255-878-3311    [id number] [employer]. [address] [place] [address]. All rights reserved. This information is not intended as a substitute for professional medical care. Always follow your healthcare professional's instructions.          * Abdominal Pain,Uncertain Cause [Male]  Based on your visit today, the exact cause of your abdominal (stomach) pain is not clear. Your exam and tests do not indicate a dangerous cause at this time. However, the signs of a serious problem may take more time to appear. Although your exam was reassuring today, sometimes early in the course of many conditions, exam and lab tests can appear normal. Therefore, it is important for you to watch for any new symptoms or worsening of your condition.  Causes:  It may not be obvious what caused your symptoms. Pay attention to things that do seem to make your symptoms worse or better and discuss this with your doctor when you follow up.  Diagnosis:  The evaluation of abdominal pain in the emergency department may only require an exam by the doctor or it may include blood, urine or imaging studies, depending on many factors. Sometimes exams and tests can identify a cause but in many cases, a clear cause is not found. Further testing at follow up visits may help to suggest a clear diagnosis.  Home Care:    Rest as much as possible until your next exam.    Try to avoid any medications (unless otherwise directed by your doctor), foods,  activities, or other factors that you may have contributed to your symptoms.    Try to eat foods that you know that you have tolerated well in the past. Certain diets may be recommended for some conditions that cause abdominal pain. However, since the cause of your symptoms may not be clear, discuss your diet more with your primary care provider or specialist for further recommendations.     Eating several small meals per day as opposed to 2 or 3 larger meals may help.    Monitor closely for anything that may make your symptoms worse or better. Pay close attention to symptoms below that may indicate worsening of your condition.  Follow Up And Precautions:  See your doctor or this facility as instructed (or sooner, if your symptoms are not improving). In some cases, you may need more testing.  Contact Your Doctor Or Seek Medical Attention  if any of the following occur:    Pain is becoming worse    You are unable to take your medications because of too much vomiting    Swelling of the abdomen    Fever of 101 F (38.3 C) or higher, or as directed by your health care provider    Blood in vomit or bowel movements (dark red or black color)    Jaundice (yellow color of eyes and skin)    New onset of weakness, dizziness or fainting    New onset of chest, arm, back, neck or jaw pain    [phone] [address], [place] [address]. All rights reserved. This information is not intended as a substitute for professional medical care. Always follow your healthcare professional's instructions.      Please make an appointment to follow up with [facility], Your Primary Care Provider and Pain Clinic (phone: [phone]) as soon as possible even if entirely better.

## 2017-02-19 NOTE — ED PROVIDER NOTES
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 The patient presents with another episode of abdominal pain that began a few days ago. Denies any fevers, nausea, vomiting, diarrhea. He was seen by Dr. Schultz about a week ago with very similar symptoms, at which time he had labs and CT (all unremarkable) however he was noted to have many narcotic prescriptions that he did not disclose and was informed that we do not feel comfortable treating chronic abdominal pain with narcotic prescriptions. He initially stated his primary care provider is Dr. Tobin but interestingly Epic chart notes Dr. Tobin no longer prescribes any narcotics as of 11/2016.     I have reviewed the Medications, Allergies, Past Medical and Surgical History, and Social History in the [facility] system.  Past Medical History   Diagnosis Date     Allergic state      Amyloidosis (H)      Diabetes mellitus (H)      type 2     Headache(784.0)      Hypertension 2007     Myalgia and myositis, unspecified      Obsessive-compulsive disorders      Other acquired absence of organ 94     Other specified viral warts      Pain in the abdomen      Pure hypercholesterolemia        Past Surgical History   Procedure Laterality Date     C nonspecific procedure  94     Cholecystectomy     Hernia repair, umbilical  2006     C nonspecific procedure  2000     repair deviated septum     Cholecystectomy  1995     lap qian     Colonoscopy  2012     hx polyps     Esophagoscopy, gastroscopy, duodenoscopy (egd), combined N/A 5/29/2015     Procedure: COMBINED ESOPHAGOSCOPY, GASTROSCOPY, DUODENOSCOPY (EGD),  BIOPSY SINGLE OR MULTIPLE;  Surgeon: John Jacob MD;  Location:  GI     Bone marrow biopsy, bone specimen, needle/trocar N/A 6/8/2016     Procedure: BIOPSY BONE MARROW;  Surgeon: Nathan Agrawal MD;  Location:  GI       Family History   Problem Relation Age of Onset     CEREBROVASCULAR DISEASE Mother      C.A.D. Mother      Hypertension Mother      Alcohol/Drug Mother      Arthritis Mother      CEREBROVASCULAR DISEASE Maternal Grandmother      C.A.D. Maternal Grandmother      Alcohol/Drug Maternal Grandmother      C.A.D. Father      HEART DISEASE Father      Hypertension Father      Alcohol/Drug Father      Allergies Father      Circulatory Father      Depression Father      Respiratory Father      DIABETES Brother      Alcohol/Drug Paternal Grandfather      CEREBROVASCULAR DISEASE Paternal Grandfather      Allergies Sister      Depression Sister      Gynecology Sister      Depression Son      Psychotic Disorder Son      anxiety disorder     Depression Daughter      CEREBROVASCULAR DISEASE Maternal Grandfather      CEREBROVASCULAR DISEASE Paternal Grandmother      Coronary Artery Disease No family hx of      Hyperlipidemia No family hx of      Breast Cancer No family hx of      Colon Cancer No family hx of      Prostate Cancer No family hx of      Other Cancer No family hx of      Anxiety Disorder No family hx of      MENTAL ILLNESS No family hx of      Substance Abuse No family hx of      Anesthesia Reaction No family hx of      Asthma No family hx of      OSTEOPOROSIS No family hx of      Genetic Disorder No family hx of      Thyroid Disease No family hx of      Obesity No family hx of      Unknown/Adopted No family hx of        Social History   Substance Use Topics     Smoking status: Never Smoker     Smokeless tobacco: Never Used     Alcohol use No      Comment: rarely       No current facility-administered medications for this encounter.      Current Outpatient Prescriptions  "  Medication     order for DME     atorvastatin (LIPITOR) 10 MG tablet     metFORMIN (GLUCOPHAGE-XR) 500 MG 24 hr tablet     lisinopril (PRINIVIL,ZESTRIL) 20 MG tablet     oxyCODONE (ROXICODONE) 5 MG immediate release tablet     oxyCODONE (ROXICODONE) 5 MG immediate release tablet     UNKNOWN MED DOSAGE          Allergies   Allergen Reactions     Sulfa Drugs Swelling     Pt has taken  Taken sulfa drugs orally without trouble. He had problems with Sulfa eye drops. Eye swelled up     Victoza      Increased migraine frequency and severity      Review of Systems   Constitutional: Negative for fever.   Respiratory: Negative for shortness of breath.    Cardiovascular: Negative for chest pain.   Gastrointestinal: Positive for abdominal pain.   All other systems reviewed and are negative.     ROS: 10 point ROS neg other than the symptoms noted above in the HPI.   Physical Exam   BP: (!) 159/112  Pulse: 80  Heart Rate: 72  Temp: 97.7  F (36.5  C)  Resp: 18  Height: 170.2 cm (5' 7\")  Weight: 88.5 kg (195 lb)  SpO2: 98 %  Physical Exam   Constitutional: He is oriented to person, place, and time. No distress.   HENT:   Head: Atraumatic.   Mouth/Throat: Oropharynx is clear and moist. No oropharyngeal exudate.   Eyes: Pupils are equal, round, and reactive to light. No scleral icterus.   Neck: Neck supple. No JVD present.   Cardiovascular: Normal rate, normal heart sounds and intact distal pulses.  Exam reveals no gallop and no friction rub.    No murmur heard.  Pulmonary/Chest: Effort normal and breath sounds normal. No respiratory distress. He has no wheezes. He has no rales. He exhibits no tenderness.   Abdominal: Soft. Bowel sounds are normal. He exhibits no distension and no mass. There is no tenderness. There is no rebound and no guarding.   Musculoskeletal: He exhibits no edema or tenderness.   Neurological: He is alert and oriented to person, place, and time. No cranial nerve deficit. Coordination normal.   Skin: Skin is " warm. No rash noted. He is not diaphoretic.       ED Course     ED Course     Procedures        Results for orders placed or performed during the hospital encounter of 02/19/17 (from the past 24 hour(s))   Glucose by meter     Status: Abnormal    Collection Time: 02/19/17  6:59 AM   Result Value Ref Range    Glucose 126 (H) 70 - 99 mg/dL   CBC with platelets differential     Status: Abnormal    Collection Time: 02/19/17  7:34 AM   Result Value Ref Range    WBC 8.0 4.0 - 11.0 10e9/L    RBC Count 3.97 (L) 4.4 - 5.9 10e12/L    Hemoglobin 12.8 (L) 13.3 - 17.7 g/dL    Hematocrit 35.4 (L) 40.0 - 53.0 %    MCV 89 78 - 100 fl    MCH 32.2 26.5 - 33.0 pg    MCHC 36.2 31.5 - 36.5 g/dL    RDW 16.1 (H) 10.0 - 15.0 %    Platelet Count 87 (L) 150 - 450 10e9/L    Diff Method Automated Method     % Neutrophils 84.9 %    % Lymphocytes 9.5 %    % Monocytes 4.2 %    % Eosinophils 1.1 %    % Basophils 0.1 %    % Immature Granulocytes 0.2 %    Nucleated RBCs 0 0 /100    Absolute Neutrophil 6.8 1.6 - 8.3 10e9/L    Absolute Lymphocytes 0.8 0.8 - 5.3 10e9/L    Absolute Monocytes 0.3 0.0 - 1.3 10e9/L    Absolute Eosinophils 0.1 0.0 - 0.7 10e9/L    Absolute Basophils 0.0 0.0 - 0.2 10e9/L    Abs Immature Granulocytes 0.0 0 - 0.4 10e9/L    Absolute Nucleated RBC 0.0    INR     Status: None    Collection Time: 02/19/17  7:34 AM   Result Value Ref Range    INR 1.08 0.86 - 1.14   Comprehensive metabolic panel     Status: Abnormal    Collection Time: 02/19/17  7:34 AM   Result Value Ref Range    Sodium 134 133 - 144 mmol/L    Potassium 3.6 3.4 - 5.3 mmol/L    Chloride 99 94 - 109 mmol/L    Carbon Dioxide 22 20 - 32 mmol/L    Anion Gap 13 3 - 14 mmol/L    Glucose 108 (H) 70 - 99 mg/dL    Urea Nitrogen 10 7 - 30 mg/dL    Creatinine 0.69 0.66 - 1.25 mg/dL    GFR Estimate >90  Non  GFR Calc   >60 mL/min/1.7m2    GFR Estimate If Black >90   GFR Calc   >60 mL/min/1.7m2    Calcium 9.1 8.5 - 10.1 mg/dL    Bilirubin Total 1.5  (H) 0.2 - 1.3 mg/dL    Albumin 3.8 3.4 - 5.0 g/dL    Protein Total 6.6 (L) 6.8 - 8.8 g/dL    Alkaline Phosphatase 107 40 - 150 U/L    ALT 31 0 - 70 U/L    AST 16 0 - 45 U/L   Lipase     Status: None    Collection Time: 02/19/17  7:34 AM   Result Value Ref Range    Lipase 108 73 - 393 U/L   CRP inflammation     Status: Abnormal    Collection Time: 02/19/17  7:34 AM   Result Value Ref Range    CRP Inflammation 8.9 (H) 0.0 - 8.0 mg/L   Erythrocyte sedimentation rate auto     Status: None    Collection Time: 02/19/17  7:34 AM   Result Value Ref Range    Sed Rate 16 0 - 20 mm/h   UA reflex to Microscopic and Culture     Status: Abnormal    Collection Time: 02/19/17  7:46 AM   Result Value Ref Range    Color Urine Light Yellow     Appearance Urine Clear     Glucose Urine 30 (A) NEG mg/dL    Bilirubin Urine Negative NEG    Ketones Urine 10 (A) NEG mg/dL    Specific Gravity Urine 1.009 1.003 - 1.035    Blood Urine Negative NEG    pH Urine 8.0 (H) 5.0 - 7.0 pH    Protein Albumin Urine Negative NEG mg/dL    Urobilinogen mg/dL Normal 0.0 - 2.0 mg/dL    Nitrite Urine Negative NEG    Leukocyte Esterase Urine Negative NEG    Source Midstream Urine     RBC Urine <1 0 - 2 /HPF    WBC Urine <1 0 - 2 /HPF   Drug abuse screen 6 urine (chem dep)     Status: None    Collection Time: 02/19/17  7:46 AM   Result Value Ref Range    Amphetamine Qual Urine  NEG     Negative   Cutoff for a negative amphetamine is 500 ng/mL or less.      Barbiturates Qual Urine  NEG     Negative   Cutoff for a negative barbiturate is 200 ng/mL or less.      Benzodiazepine Qual Urine  NEG     Negative   Cutoff for a negative benzodiazepine is 200 ng/mL or less.      Cannabinoids Qual Urine  NEG     Negative   Cutoff for a negative cannabinoid is 50 ng/mL or less.      Cocaine Qual Urine  NEG     Negative   Cutoff for a negative cocaine is 300 ng/mL or less.      Ethanol Qual Urine  NEG     Negative   Cutoff for a negative urine ethanol is 0.05 g/dL or less       Opiates Qualitative Urine  NEG     Negative   Cutoff for a negative opiate is 300 ng/mL or less.     XR Abdomen 2 Views     Status: None    Collection Time: 02/19/17  8:48 AM    Narrative    ABDOMEN TWO VIEWS 2/19/2017 8:48 AM     HISTORY: Pain.       Impression    IMPRESSION: Flat and upright views of the abdomen. Bowel gas pattern  is unremarkable. Air is scattered throughout loops of small bowel and  colon. No obvious distended loops to suggest obstruction.  Cholecystectomy clips are present in the right upper quadrant. No  definite free intraperitoneal air. No abnormal calcifications are  evident.    RICKY CAGE MD           Labs Ordered and Resulted from Time of ED Arrival Up to the Time of Departure from the ED   GLUCOSE BY METER - Abnormal; Notable for the following:        Result Value    Glucose 126 (*)     All other components within normal limits   CBC WITH PLATELETS DIFFERENTIAL - Abnormal; Notable for the following:     RBC Count 3.97 (*)     Hemoglobin 12.8 (*)     Hematocrit 35.4 (*)     RDW 16.1 (*)     Platelet Count 87 (*)     All other components within normal limits   COMPREHENSIVE METABOLIC PANEL - Abnormal; Notable for the following:     Glucose 108 (*)     Bilirubin Total 1.5 (*)     Protein Total 6.6 (*)     All other components within normal limits   CRP INFLAMMATION - Abnormal; Notable for the following:     CRP Inflammation 8.9 (*)     All other components within normal limits   UA MACROSCOPIC WITH REFLEX TO MICRO AND CULTURE - Abnormal; Notable for the following:     Glucose Urine 30 (*)     Ketones Urine 10 (*)     pH Urine 8.0 (*)     All other components within normal limits   INR   LIPASE   ERYTHROCYTE SEDIMENTATION RATE AUTO   DRUG ABUSE SCREEN 6 CHEM DEP URINE ([facility])       Assessments & Plan (with Medical Decision Making)  Chronic abd pain in the pt with amyloidosis s/p stem cell transplant 1/2017, as Dr Schultz's note stated he has long hx of opioid abuse, informed how dangerous it  is to abue opioids again today but offered toradol and reglan and NS, labs and XR all neg again, [facility] record also reviewed and interestingly he did not receive opioid there, will DC and follow up with his Primary [facility], his Primary MD, possibly Pain MD.       I have reviewed the nursing notes.    I have reviewed the findings, diagnosis, plan and need for follow up with the patient.    Discharge Medication List as of 2/19/2017  9:06 AM          Final diagnoses:   Chronic abdominal pain   Other amyloidosis (H)   Traci SPRINGER, am serving as a trained medical scribe to document services personally performed by Donya Winslow MD, based on the provider's statements to me. This document has been checked and approved by the attending provider.    IDonya MD was physically present and have reviewed and verified the accuracy of this note documented by Traci Alonso.      2/19/2017   [facility], [place], EMERGENCY DEPARTMENT     Donya Winslow MD  02/19/17 [id number]

## 2017-02-19 NOTE — ED AVS SNAPSHOT
[facility], [place], Emergency Department    [address]    [facility] [address]    Phone:  [phone]    Fax:  [phone]                                       Parmjit Hernández   MRN: [id number]    Department:  [facility], Emergency Department   Date of Visit:  2/19/2017           After Visit Summary Signature Page     I have received my discharge instructions, and my questions have been answered. I have discussed any challenges I see with this plan with the nurse or doctor.    ..........................................................................................................................................  Patient/Patient Representative Signature      ..........................................................................................................................................  Patient Representative Print Name and Relationship to Patient    ..................................................               ................................................  Date                                            Time    ..........................................................................................................................................  Reviewed by Signature/Title    ...................................................              ..............................................  Date                                                            Time

## 2017-02-19 NOTE — ED AVS SNAPSHOT
[facility], Emergency Department    [address]    [place] [address]    Phone:  [phone]    Fax:  [phone]                                       Parmjit Hernández   MRN: [id number]    Department:  [facility], Emergency Department   Date of Visit:  2/19/2017           Patient Information     Date Of Birth          1955        Your diagnoses for this visit were:     Chronic abdominal pain     Other amyloidosis (H)        You were seen by Donya Winslow MD.        Discharge Instructions         Chronic Pain  Pain serves an important role. It lets you know something is wrong that needs your attention. When the body heals, pain normally goes away.  When pain lasts longer than six months, it is called  chronic  pain. This is pain that is present even after the body has healed. Chronic pain can cause mood problems and get in the way of your relationships and your daily life.  A number of conditions can cause chronic pain. Some of the more common include:    Previous surgery    An old injury    Infection    Diseases such as diabetes    Nerve damage    Back injury    Arthritis    Migraine or other headaches    Fibromyalgia    Cancer  Depression and stress can make chronic pain symptoms worse. In some cases, a cause for the pain cannot be found.   Treatment  Treatment can greatly reduce pain. In many cases, pain can become less severe, occur less often, and interfere less with your daily life. Chronic pain is often treated with a combination of medicines, therapies, and lifestyle changes. You will work closely with your healthcare provider to find a treatment plan that works best for you.    Ask your healthcare provider for a referral to a pain management specialty center. These can provide the most recent and proven pain management strategies, along with emotional support and comprehensive services.    Several different types of medicines may be prescribed for chronic pain. Work with your  healthcare provider to develop a medicine plan that helps manage your pain.    Physical therapy can be very effective in helping reduce certain types of chronic pain.    Occupational therapy teaches you how to do routine tasks of daily living in ways that lessen your discomfort.    Psychological therapy can help you cope better with stress and pain.    Other therapies such as meditation, yoga, biofeedback, massage, and acupuncture can also help manage chronic pain.    Changing certain habits can help reduce chronic pain. They include:    Eating healthy    Developing an exercise routine    Getting enough sleep at night    Stopping smoking and limiting alcohol use    Losing excess weight  Follow-up care  Follow up with your healthcare provider as advised. Let your healthcare provider know if your current treatment plan is working or if changes are needed.  Resources  For more information, contact:    American [place] for Headache Society www.achenet.org    American Chronic Pain Association www.theacpa.org 210-396-3623    0156-8397 [employer]. [address] [place] [address]. All rights reserved. This information is not intended as a substitute for professional medical care. Always follow your healthcare professional's instructions.          * Abdominal Pain,Uncertain Cause [Male]  Based on your visit today, the exact cause of your abdominal (stomach) pain is not clear. Your exam and tests do not indicate a dangerous cause at this time. However, the signs of a serious problem may take more time to appear. Although your exam was reassuring today, sometimes early in the course of many conditions, exam and lab tests can appear normal. Therefore, it is important for you to watch for any new symptoms or worsening of your condition.  Causes:  It may not be obvious what caused your symptoms. Pay attention to things that do seem to make your symptoms worse or better and discuss this with your doctor  when you follow up.  Diagnosis:  The evaluation of abdominal pain in the emergency department may only require an exam by the doctor or it may include blood, urine or imaging studies, depending on many factors. Sometimes exams and tests can identify a cause but in many cases, a clear cause is not found. Further testing at follow up visits may help to suggest a clear diagnosis.  Home Care:    Rest as much as possible until your next exam.    Try to avoid any medications (unless otherwise directed by your doctor), foods, activities, or other factors that you may have contributed to your symptoms.    Try to eat foods that you know that you have tolerated well in the past. Certain diets may be recommended for some conditions that cause abdominal pain. However, since the cause of your symptoms may not be clear, discuss your diet more with your primary care provider or specialist for further recommendations.     Eating several small meals per day as opposed to 2 or 3 larger meals may help.    Monitor closely for anything that may make your symptoms worse or better. Pay close attention to symptoms below that may indicate worsening of your condition.  Follow Up And Precautions:  See your doctor or this facility as instructed (or sooner, if your symptoms are not improving). In some cases, you may need more testing.  Contact Your Doctor Or Seek Medical Attention  if any of the following occur:    Pain is becoming worse    You are unable to take your medications because of too much vomiting    Swelling of the abdomen    Fever of 101 F (38.3 C) or higher, or as directed by your health care provider    Blood in vomit or bowel movements (dark red or black color)    Jaundice (yellow color of eyes and skin)    New onset of weakness, dizziness or fainting    New onset of chest, arm, back, neck or jaw pain    [id number] [facility], [address] [place] [address], [place] [address]. All rights reserved. This information is not intended as  a substitute for professional medical care. Always follow your healthcare professional's instructions.      Please make an appointment to follow up with Your [facility], Your Primary Care Provider and Pain Clinic (phone: [phone]) as soon as possible even if entirely better.     24 Hour Appointment Hotline       To make an appointment at any [facility], call [phone] ([phone]). If you don't have a family doctor or clinic, we will help you find one. [facility] are conveniently located to serve the needs of you and your family.             Review of your medicines      Our records show that you are taking the medicines listed below. If these are incorrect, please call your family doctor or clinic.        Dose / Directions Last dose taken    atorvastatin 10 MG tablet   Commonly known as:  LIPITOR   Quantity:  90 tablet        TAKE ONE TABLET BY MOUTH ONE TIME DAILY   Refills:  2        lisinopril 20 MG tablet   Commonly known as:  PRINIVIL/ZESTRIL   Quantity:  90 tablet        TAKE ONE TABLET BY MOUTH ONE TIME DAILY   Refills:  3        metFORMIN 500 MG 24 hr tablet   Commonly known as:  GLUCOPHAGE-XR   Quantity:  360 tablet        TAKE FOUR TABLETS BY MOUTH DAILY   Refills:  PRN        order for DME   Quantity:  1 Device        Equipment being ordered: CPAP   Refills:  0        * oxyCODONE 5 MG IR tablet   Commonly known as:  ROXICODONE   Quantity:  70 tablet        1-2 q 6 hrs prn pain. Maximum of 10 a day   Refills:  0        * oxyCODONE 5 MG IR tablet   Commonly known as:  ROXICODONE   Quantity:  70 tablet        1 q 4 hr prn pain up to 10 a day   Refills:  0        UNKNOWN MED DOSAGE   Indication:  chemotherapeutic medication weekly - 12 pills        Refills:  0        * Notice:  This list has 2 medication(s) that are the same as other medications prescribed for you. Read the directions carefully, and ask your doctor or other care provider to review them with you.             Procedures and tests performed during your visit     CBC with platelets differential    CRP inflammation    Comprehensive metabolic panel    Drug abuse screen 6 urine (chem dep)    Erythrocyte sedimentation rate auto    Glucose by meter    INR    Lipase    UA reflex to Microscopic and Culture    XR Abdomen 2 Views      Orders Needing Specimen Collection     None      Pending Results     Date and Time Order Name Status Description    2/19/2017 0731 XR Abdomen 2 Views In process             Pending Culture Results     No orders found from 2/17/2017 to 2/20/2017.            Thank you for choosing [facility]       Thank you for choosing [facility] for your care. Our goal is always to provide you with excellent care. Hearing back from our patients is one way we can continue to improve our services. Please take a few minutes to complete the written survey that you may receive in the mail after you visit with us. Thank you!        [employer] Information     [employer] gives you secure access to your electronic health record. If you see a primary care provider, you can also send messages to your care team and make appointments. If you have questions, please call your primary care clinic.  If you do not have a primary care provider, please call [phone] and they will assist you.        Care EveryWhere ID     This is your Care EveryWhere ID. This could be used by other organizations to access your [facility] medical records  [id number]        After Visit Summary       This is your record. Keep this with you and show to your community pharmacist(s) and doctor(s) at your next visit.

## 2017-03-06 DIAGNOSIS — G89.29 CHRONIC ABDOMINAL PAIN: ICD-10-CM

## 2017-03-06 DIAGNOSIS — R10.9 CHRONIC ABDOMINAL PAIN: ICD-10-CM

## 2017-03-06 RX ORDER — OXYCODONE HYDROCHLORIDE 5 MG/1
TABLET ORAL
Qty: 10 TABLET | Refills: 0 | Status: CANCELLED | OUTPATIENT
Start: 2017-03-06

## 2017-03-06 NOTE — TELEPHONE ENCOUNTER
Reason for Call:  Medication or medication refill:    Do you use a [facility]?  Name of the pharmacy and phone number for the current request:      only    Name of the medication requested: oxyCODONE (ROXICODONE) 5 MG immediate release tablet    Other request: please call patient when Rx is ready for     Can we leave a detailed message on this number? YES    Phone number patient can be reached at: Home number on file [phone] (home)    Best Time: anytime    Call taken on 3/6/2017 at 11:16 AM by Lucina Hawk

## 2017-03-07 NOTE — TELEPHONE ENCOUNTER
Note from 11/18/16 TE (panel management/chronic pain):  Spike Tobin MD       5:39 PM   Note      No longer receiving narcotics from me-how to get off list?              VM left informing patient refill request denied.  If he has any questions advised to schedule OV with MP.  Wandy Pena RN

## 2017-03-10 ENCOUNTER — HOSPITAL ENCOUNTER (EMERGENCY)
Facility: CLINIC | Age: 62
Discharge: HOME | End: 2017-03-10
Attending: EMERGENCY MEDICINE | Admitting: EMERGENCY MEDICINE
Payer: COMMERCIAL

## 2017-03-10 VITALS
HEART RATE: 84 BPM | OXYGEN SATURATION: 94 % | SYSTOLIC BLOOD PRESSURE: 141 MMHG | WEIGHT: 195 LBS | BODY MASS INDEX: 36.82 KG/M2 | DIASTOLIC BLOOD PRESSURE: 95 MMHG | RESPIRATION RATE: 18 BRPM | HEIGHT: 61 IN | TEMPERATURE: 97.5 F

## 2017-03-10 DIAGNOSIS — G62.9 PERIPHERAL POLYNEUROPATHY: ICD-10-CM

## 2017-03-10 LAB
ALBUMIN SERPL-MCNC: 3.5 G/DL (ref 3.4–5)
ALP SERPL-CCNC: 120 U/L (ref 40–150)
ALT SERPL W P-5'-P-CCNC: 30 U/L (ref 0–70)
ANION GAP SERPL CALCULATED.3IONS-SCNC: 13 MMOL/L (ref 3–14)
AST SERPL W P-5'-P-CCNC: 12 U/L (ref 0–45)
BASOPHILS # BLD AUTO: 0 10E9/L (ref 0–0.2)
BASOPHILS NFR BLD AUTO: 0.6 %
BILIRUB SERPL-MCNC: 2.2 MG/DL (ref 0.2–1.3)
BUN SERPL-MCNC: 13 MG/DL (ref 7–30)
CALCIUM SERPL-MCNC: 8.6 MG/DL (ref 8.5–10.1)
CHLORIDE SERPL-SCNC: 101 MMOL/L (ref 94–109)
CO2 SERPL-SCNC: 21 MMOL/L (ref 20–32)
CREAT SERPL-MCNC: 0.77 MG/DL (ref 0.66–1.25)
DIFFERENTIAL METHOD BLD: ABNORMAL
EOSINOPHIL # BLD AUTO: 0.2 10E9/L (ref 0–0.7)
EOSINOPHIL NFR BLD AUTO: 2.4 %
ERYTHROCYTE [DISTWIDTH] IN BLOOD BY AUTOMATED COUNT: 16 % (ref 10–15)
GFR SERPL CREATININE-BSD FRML MDRD: ABNORMAL ML/MIN/1.7M2
GLUCOSE SERPL-MCNC: 112 MG/DL (ref 70–99)
HCT VFR BLD AUTO: 33.9 % (ref 40–53)
HGB BLD-MCNC: 12.6 G/DL (ref 13.3–17.7)
IMM GRANULOCYTES # BLD: 0.1 10E9/L (ref 0–0.4)
IMM GRANULOCYTES NFR BLD: 1 %
LYMPHOCYTES # BLD AUTO: 0.6 10E9/L (ref 0.8–5.3)
LYMPHOCYTES NFR BLD AUTO: 7.9 %
MCH RBC QN AUTO: 32.8 PG (ref 26.5–33)
MCHC RBC AUTO-ENTMCNC: 37.2 G/DL (ref 31.5–36.5)
MCV RBC AUTO: 88 FL (ref 78–100)
MONOCYTES # BLD AUTO: 0.6 10E9/L (ref 0–1.3)
MONOCYTES NFR BLD AUTO: 8.1 %
NEUTROPHILS # BLD AUTO: 5.7 10E9/L (ref 1.6–8.3)
NEUTROPHILS NFR BLD AUTO: 80 %
NRBC # BLD AUTO: 0 10*3/UL
NRBC BLD AUTO-RTO: 0 /100
PLATELET # BLD AUTO: 101 10E9/L (ref 150–450)
POTASSIUM SERPL-SCNC: 3.2 MMOL/L (ref 3.4–5.3)
PROT SERPL-MCNC: 6.2 G/DL (ref 6.8–8.8)
RBC # BLD AUTO: 3.84 10E12/L (ref 4.4–5.9)
SODIUM SERPL-SCNC: 135 MMOL/L (ref 133–144)
WBC # BLD AUTO: 7.1 10E9/L (ref 4–11)

## 2017-03-10 PROCEDURE — 96376 TX/PRO/DX INJ SAME DRUG ADON: CPT | Performed by: EMERGENCY MEDICINE

## 2017-03-10 PROCEDURE — 99285 EMERGENCY DEPT VISIT HI MDM: CPT | Mod: 25 | Performed by: EMERGENCY MEDICINE

## 2017-03-10 PROCEDURE — 99285 EMERGENCY DEPT VISIT HI MDM: CPT | Mod: Z6 | Performed by: EMERGENCY MEDICINE

## 2017-03-10 PROCEDURE — 25000128 H RX IP 250 OP 636: Performed by: EMERGENCY MEDICINE

## 2017-03-10 PROCEDURE — 96374 THER/PROPH/DIAG INJ IV PUSH: CPT | Performed by: EMERGENCY MEDICINE

## 2017-03-10 PROCEDURE — 85025 COMPLETE CBC W/AUTO DIFF WBC: CPT | Performed by: EMERGENCY MEDICINE

## 2017-03-10 PROCEDURE — 25000132 ZZH RX MED GY IP 250 OP 250 PS 637: Performed by: EMERGENCY MEDICINE

## 2017-03-10 PROCEDURE — 80053 COMPREHEN METABOLIC PANEL: CPT | Performed by: EMERGENCY MEDICINE

## 2017-03-10 RX ORDER — GABAPENTIN 300 MG/1
300 CAPSULE ORAL ONCE
Status: COMPLETED | OUTPATIENT
Start: 2017-03-10 | End: 2017-03-10

## 2017-03-10 RX ORDER — OXYCODONE HYDROCHLORIDE 5 MG/1
10 TABLET ORAL EVERY 6 HOURS PRN
Qty: 20 TABLET | Refills: 0 | Status: SHIPPED | OUTPATIENT
Start: 2017-03-10 | End: 2017-05-03

## 2017-03-10 RX ORDER — HYDROMORPHONE HYDROCHLORIDE 1 MG/ML
0.5 INJECTION, SOLUTION INTRAMUSCULAR; INTRAVENOUS; SUBCUTANEOUS
Status: DISCONTINUED | OUTPATIENT
Start: 2017-03-10 | End: 2017-03-10 | Stop reason: HOSPADM

## 2017-03-10 RX ORDER — GABAPENTIN 300 MG/1
300 CAPSULE ORAL 3 TIMES DAILY
Qty: 45 CAPSULE | Refills: 0 | Status: SHIPPED | OUTPATIENT
Start: 2017-03-10 | End: 2017-03-18

## 2017-03-10 RX ADMIN — HYDROMORPHONE HYDROCHLORIDE 0.5 MG: 10 INJECTION, SOLUTION INTRAMUSCULAR; INTRAVENOUS; SUBCUTANEOUS at 01:15

## 2017-03-10 RX ADMIN — HYDROMORPHONE HYDROCHLORIDE 1 MG: 1 INJECTION, SOLUTION INTRAMUSCULAR; INTRAVENOUS; SUBCUTANEOUS at 02:28

## 2017-03-10 RX ADMIN — GABAPENTIN 300 MG: 300 CAPSULE ORAL at 02:34

## 2017-03-10 RX ADMIN — HYDROMORPHONE HYDROCHLORIDE 1 MG: 1 INJECTION, SOLUTION INTRAMUSCULAR; INTRAVENOUS; SUBCUTANEOUS at 01:58

## 2017-03-10 ASSESSMENT — ENCOUNTER SYMPTOMS
FEVER: 0
CHILLS: 0
NUMBNESS: 1

## 2017-03-10 NOTE — ED AVS SNAPSHOT
[facility], [place], Emergency Department    [address]    [facility] [address]    Phone:  [phone]    Fax:  [phone]                                       Parmjit Hernández   MRN: [id number]    Department:  [facility], Emergency Department   Date of Visit:  3/10/2017           After Visit Summary Signature Page     I have received my discharge instructions, and my questions have been answered. I have discussed any challenges I see with this plan with the nurse or doctor.    ..........................................................................................................................................  Patient/Patient Representative Signature      ..........................................................................................................................................  Patient Representative Print Name and Relationship to Patient    ..................................................               ................................................  Date                                            Time    ..........................................................................................................................................  Reviewed by Signature/Title    ...................................................              ..............................................  Date                                                            Time

## 2017-03-10 NOTE — ED PROVIDER NOTES
History     Chief Complaint   Patient presents with     Leg Pain     Both legs severe pain, burning sensation for the last four or five days. Chemo and stem cell transplant at the [facility] Jan '17- pt thinks it is because of that.     FINA Hernández is a 61 year old male who presents to the Emergency Department for evaluation of severe bilateral leg pain and burning sensation of bilateral feet. Patient states the pain has been present for the past 4-5 days with burning in his feet that radiates up to his knees. Patient has also been experiencing numbness in lower legs and especially in his feet. Patient reports a history of amyloidosis with recent chemotherapy and stem cell transplant at [facility] this past January 2017 (2 months ago). Patient was told by [facility] that his current symptoms may be a side effect of his recent treatment. Patient denies fevers, chills, or other side effects from the stem cell transplant. Patient states he is currently taking Gabapentin 100mg PO TID with minimal relief. Patient reports he is scheduled to follow up with [facility] again in April.     PAST MEDICAL HISTORY  Past Medical History   Diagnosis Date     Allergic state      Amyloidosis (H)      Diabetes mellitus (H)      type 2     Headache(784.0)      Hypertension 2007     Myalgia and myositis, unspecified      Obsessive-compulsive disorders      Other acquired absence of organ 94     Other specified viral warts      Pain in the abdomen      Pure hypercholesterolemia      PAST SURGICAL HISTORY  Past Surgical History   Procedure Laterality Date     C nonspecific procedure  94     Cholecystectomy     Hernia repair, umbilical  2006     C nonspecific procedure  2000     repair deviated septum     Cholecystectomy  1995     lap qian     Colonoscopy  2012     hx polyps     Esophagoscopy, gastroscopy, duodenoscopy (egd), combined N/A 5/29/2015     Procedure: COMBINED ESOPHAGOSCOPY, GASTROSCOPY, DUODENOSCOPY (EGD), BIOPSY SINGLE OR MULTIPLE;   Surgeon: John Jacob MD;  Location:  GI     Bone marrow biopsy, bone specimen, needle/trocar N/A 6/8/2016     Procedure: BIOPSY BONE MARROW;  Surgeon: Nathan Agrawal MD;  Location:  GI     FAMILY HISTORY  Family History   Problem Relation Age of Onset     CEREBROVASCULAR DISEASE Mother      C.A.D. Mother      Hypertension Mother      Alcohol/Drug Mother      Arthritis Mother      CEREBROVASCULAR DISEASE Maternal Grandmother      C.A.D. Maternal Grandmother      Alcohol/Drug Maternal Grandmother      C.A.D. Father      HEART DISEASE Father      Hypertension Father      Alcohol/Drug Father      Allergies Father      Circulatory Father      Depression Father      Respiratory Father      DIABETES Brother      Alcohol/Drug Paternal Grandfather      CEREBROVASCULAR DISEASE Paternal Grandfather      Allergies Sister      Depression Sister      Gynecology Sister      Depression Son      Psychotic Disorder Son      anxiety disorder     Depression Daughter      CEREBROVASCULAR DISEASE Maternal Grandfather      CEREBROVASCULAR DISEASE Paternal Grandmother      Coronary Artery Disease No family hx of      Hyperlipidemia No family hx of      Breast Cancer No family hx of      Colon Cancer No family hx of      Prostate Cancer No family hx of      Other Cancer No family hx of      Anxiety Disorder No family hx of      MENTAL ILLNESS No family hx of      Substance Abuse No family hx of      Anesthesia Reaction No family hx of      Asthma No family hx of      OSTEOPOROSIS No family hx of      Genetic Disorder No family hx of      Thyroid Disease No family hx of      Obesity No family hx of      Unknown/Adopted No family hx of      SOCIAL HISTORY  Social History   Substance Use Topics     Smoking status: Never Smoker     Smokeless tobacco: Never Used     Alcohol use No      Comment: rarely     MEDICATIONS  No current facility-administered medications for this encounter.      Current Outpatient Prescriptions  "  Medication     oxyCODONE (ROXICODONE) 5 MG IR tablet     gabapentin (NEURONTIN) 300 MG capsule     oxyCODONE (ROXICODONE) 5 MG immediate release tablet     oxyCODONE (ROXICODONE) 5 MG immediate release tablet     order for DME     atorvastatin (LIPITOR) 10 MG tablet     metFORMIN (GLUCOPHAGE-XR) 500 MG 24 hr tablet     lisinopril (PRINIVIL,ZESTRIL) 20 MG tablet     UNKNOWN MED DOSAGE     ALLERGIES  Allergies   Allergen Reactions     Sulfa Drugs Swelling     Pt has taken  Taken sulfa drugs orally without trouble. He had problems with Sulfa eye drops. Eye swelled up     Victoza      Increased migraine frequency and severity     I have reviewed the Medications, Allergies, Past Medical and Surgical History, and Social History in the Epic system.    Review of Systems   Constitutional: Negative for chills and fever.   Musculoskeletal:        Positive for burning sensation in feet.  Positive for leg pain.    Neurological: Positive for numbness (Bilateral legs and feet. ).   All other systems reviewed and are negative.    Physical Exam   BP: (!) 152/103  Pulse: 90  Temp: 97.5  F (36.4  C)  Resp: 13  Height: 154.2 cm (5' 0.7\")  Weight: 88.5 kg (195 lb)  SpO2: 93 %  Physical Exam   Constitutional: He appears well-developed and well-nourished. No distress.   HENT:   Head: Normocephalic and atraumatic.   Eyes: Conjunctivae and EOM are normal. Pupils are equal, round, and reactive to light.   Cardiovascular: Normal rate, regular rhythm, normal heart sounds and intact distal pulses.    Pulmonary/Chest: Effort normal and breath sounds normal. No respiratory distress. He has no wheezes. He has no rales.   Abdominal: Soft. There is no tenderness. There is no guarding.   Musculoskeletal: Normal range of motion. He exhibits no edema or tenderness.   Neurological: He is alert. He displays no atrophy. A sensory deficit (Bilateral lateral foot decreased sensation equal) is present. He exhibits normal muscle tone. Coordination and gait " normal. GCS eye subscore is 4. GCS verbal subscore is 5. GCS motor subscore is 6.   Skin: Skin is warm and dry. He is not diaphoretic. No erythema. No pallor.   Psychiatric: He has a normal mood and affect.   Nursing note and vitals reviewed.    ED Course     ED Course     Procedures        Critical Care time:  none        Labs Ordered and Resulted from Time of ED Arrival Up to the Time of Departure from the ED   CBC WITH PLATELETS DIFFERENTIAL - Abnormal; Notable for the following:        Result Value    RBC Count 3.84 (*)     Hemoglobin 12.6 (*)     Hematocrit 33.9 (*)     MCHC 37.2 (*)     RDW 16.0 (*)     Platelet Count 101 (*)     Absolute Lymphocytes 0.6 (*)     All other components within normal limits   COMPREHENSIVE METABOLIC PANEL - Abnormal; Notable for the following:     Potassium 3.2 (*)     Glucose 112 (*)     Bilirubin Total 2.2 (*)     Protein Total 6.2 (*)     All other components within normal limits       Assessments & Plan (with Medical Decision Making)   I was physically present and have reviewed and verified the accuracy of this note documented by (myself).     Disclaimer: This note consists of symbols derived from keyboarding, dictation, and/or voice recognition software. As a result, there may be errors in the script that have gone undetected.  Please consider this when interpreting information found in the chart.These sections of the chart were reviewed for accuracy to the best of my knowledge and ability.    Patient was clinically assessed and consented to treatment. After assessment, medical decision making and workup were discussed with the patient. The patient was agreeable to plan for testing, workup, and treatment.  Parmjit Hernández is a 61 year old male who presents today for lateral lower extremity burning foot pain.  Patient is a BMT patient from [facility].  He is also receiving chemotherapy and as a side effect physical peripheral neuropathy.  Patient is only taking Neurontin 100  mg 3 times daily.  Adequate dosing for peripheral up the will need to be increased to 300 mg 3 times daily.  I did discuss with him gradually increasing this.  Additionally instructed by the medical clinic to come for assessment and I did agree to doing some labs to check for any signs of neutropenia.  Patient's labs were unremarkable for neutropenia.  Hemoglobin was stable and electrolytes were within normal limits for the patient.  He does state that he takes potassium pills at home and will take his pills when he gets home.  Otherwise patient was given 2 doses of IV Dilaudid 1 mg with significant relief and his pain.  He does have a history of significant pain tolerance and I discussed home care with them.  He has talked to his doctors at the [facility] about starting back on the MS Contin to help control the pain until symptoms resolve however has not started that.  I did agree to prescribing him some oxycodone which she can take for breakthrough pain but he is to increase his Neurontin dose and follow up with his doctors at the [facility].      I have reviewed the nursing notes.    I have reviewed the findings, diagnosis, plan and need for follow up with the patient.    Discharge Medication List as of 3/10/2017  2:48 AM      START taking these medications    Details   !! oxyCODONE (ROXICODONE) 5 MG IR tablet Take 2 tablets (10 mg) by mouth every 6 hours as needed for pain, Disp-20 tablet, R-0, Local Print      gabapentin (NEURONTIN) 300 MG capsule Take 1 capsule (300 mg) by mouth 3 times daily for 15 days, Disp-45 capsule, R-0, Local Print       !! - Potential duplicate medications found. Please discuss with provider.          Final diagnoses:   Peripheral polyneuropathy (H)     IPhyllis, am serving as a trained medical scribe to document services personally performed by Omkar Waller MD, based on the provider's statements to me.   I, Omkar Waller MD, was physically present and have reviewed and  verified the accuracy of this note documented by Phyllis Singh.      3/10/2017   [facility], [place], EMERGENCY DEPARTMENT     Dru Waller MD  03/10/17 0807

## 2017-03-10 NOTE — DISCHARGE INSTRUCTIONS
-As discussed in the ER, recommend increasing your Neurontin dose to 300 mg 3 times daily over the next several days.  Start with increasing dosage to 300 mg 1 tablet in the morning tomorrow, Saturday, March 11.  Then morning and evening 300 mg dose for 2 days, and finally 300 mg 3 times daily to better control your peripheral neuropathy pain.  -Please make an appointment to follow up with hematology/oncology at the [facility] as soon as possible by phone later today.      What is Peripheral Neuropathy?    Peripheral neuropathy is a disease of the nerves. It most often starts in your feet and may also eventually affect the arms.Sensory, motor, or both functions may be affected.  It may cause pain or make you unable to sense pain. Sometimes, weakness occurs as well. Lack of pain and weakness makes you more likely to injure yourself without knowing it. But you can learn ways to protect your feet from injury.  When nerves are diseased  Nerves in your feet carry signals to your brain. Your brain reads those signals and interprets them as sensations. When nerves in your feet are diseased, signals may be disrupted or changed. The result may be a lack of feeling (numbness) in your feet or other symptoms (tingling or pain) of peripheral neuropathy.    Symptoms mask pain  Symptoms of peripheral neuropathy usually begin in your toes. The symptoms slowly spread up your feet and legs as more nerve is affected. These symptoms may decrease sensation in your feet or mask pain. Without pain, you may not notice a cut or even a bone fracture. Cuts may become infected. Fractures may heal poorly and lead to foot deformity.    Common causes of peripheral neuropathy  Some common causes of peripheral neuropathy include:    Diabetes or other endocrine disorders    Toxins (such as alcohol)    Nutritional deficiencies (such as Vitamin B-12)    Kidney disease    Injury    Repetitive stress (such as carpal tunnel syndrome)    Autoimmune  disease    Cancer and tumors    Infection  Diagnosis and treatment  Diagnosis of peripheral neuropathy includes a complete history and physical exam.  Lab tests including blood work and imaging often help determine the cause.  Special nerve tests are often helpful including nerve conduction velocity studies (NCV), and electromyelography (EMG).  Treatment focuses on teating the underlying disorder and treating the symptoms using medications, injections, TENS (transcutaneous electrical nerve stimulation), acupuncture, massage, and others.    9849-5096 [employer]. [address] [place] [address], [place] [address]. All rights reserved. This information is not intended as a substitute for professional medical care. Always follow your healthcare professional's instructions.

## 2017-03-10 NOTE — ED NOTES
Both legs severe pain, burning sensation for the last four or five days. Chemo and stem cell transplant at the [place] Jan '17- pt thinks it is because of that.

## 2017-03-10 NOTE — ED AVS SNAPSHOT
[facility], Emergency Department    [address]    [facility] [address]    Phone:  [phone]    Fax:  [phone]                                       Parmjit Hernández   MRN: [id number]    Department:  [facility], Emergency Department   Date of Visit:  3/10/2017           Patient Information     Date Of Birth          1955        Your diagnoses for this visit were:     Peripheral polyneuropathy (H)        You were seen by Dru Waller MD.        Discharge Instructions       -As discussed in the ER, recommend increasing your Neurontin dose to 300 mg 3 times daily over the next several days.  Start with increasing dosage to 300 mg 1 tablet in the morning tomorrow, Saturday, March 11.  Then morning and evening 300 mg dose for 2 days, and finally 300 mg 3 times daily to better control your peripheral neuropathy pain.  -Please make an appointment to follow up with hematology/oncology at the [facility] as soon as possible by phone later today.      What is Peripheral Neuropathy?    Peripheral neuropathy is a disease of the nerves. It most often starts in your feet and may also eventually affect the arms.Sensory, motor, or both functions may be affected.  It may cause pain or make you unable to sense pain. Sometimes, weakness occurs as well. Lack of pain and weakness makes you more likely to injure yourself without knowing it. But you can learn ways to protect your feet from injury.  When nerves are diseased  Nerves in your feet carry signals to your brain. Your brain reads those signals and interprets them as sensations. When nerves in your feet are diseased, signals may be disrupted or changed. The result may be a lack of feeling (numbness) in your feet or other symptoms (tingling or pain) of peripheral neuropathy.    Symptoms mask pain  Symptoms of peripheral neuropathy usually begin in your toes. The symptoms slowly spread up your feet and legs as more nerve is affected. These  symptoms may decrease sensation in your feet or mask pain. Without pain, you may not notice a cut or even a bone fracture. Cuts may become infected. Fractures may heal poorly and lead to foot deformity.    Common causes of peripheral neuropathy  Some common causes of peripheral neuropathy include:    Diabetes or other endocrine disorders    Toxins (such as alcohol)    Nutritional deficiencies (such as Vitamin B-12)    Kidney disease    Injury    Repetitive stress (such as carpal tunnel syndrome)    Autoimmune disease    Cancer and tumors    Infection  Diagnosis and treatment  Diagnosis of peripheral neuropathy includes a complete history and physical exam.  Lab tests including blood work and imaging often help determine the cause.  Special nerve tests are often helpful including nerve conduction velocity studies (NCV), and electromyelography (EMG).  Treatment focuses on teating the underlying disorder and treating the symptoms using medications, injections, TENS (transcutaneous electrical nerve stimulation), acupuncture, massage, and others.    4922-2207 [employer]. [address] [place] [address]. All rights reserved. This information is not intended as a substitute for professional medical care. Always follow your healthcare professional's instructions.          24 Hour Appointment Hotline       To make an appointment at any [facility], call [phone] ([phone]). If you don't have a family doctor or clinic, we will help you find one. [facility] are conveniently located to serve the needs of you and your family.             Review of your medicines      START taking        Dose / Directions Last dose taken    gabapentin 300 MG capsule   Commonly known as:  NEURONTIN   Dose:  300 mg   Quantity:  45 capsule        Take 1 capsule (300 mg) by mouth 3 times daily for 15 days   Refills:  0          CONTINUE these medicines which may have CHANGED, or have new prescriptions.  If we are uncertain of the size of tablets/capsules you have at home, strength may be listed as something that might have changed.        Dose / Directions Last dose taken    * oxyCODONE 5 MG IR tablet   Commonly known as:  ROXICODONE   What changed:  Another medication with the same name was added. Make sure you understand how and when to take each.   Quantity:  70 tablet        1-2 q 6 hrs prn pain. Maximum of 10 a day   Refills:  0        * oxyCODONE 5 MG IR tablet   Commonly known as:  ROXICODONE   What changed:  Another medication with the same name was added. Make sure you understand how and when to take each.   Quantity:  70 tablet        1 q 4 hr prn pain up to 10 a day   Refills:  0        * oxyCODONE 5 MG IR tablet   Commonly known as:  ROXICODONE   Dose:  10 mg   What changed:  You were already taking a medication with the same name, and this prescription was added. Make sure you understand how and when to take each.   Quantity:  20 tablet        Take 2 tablets (10 mg) by mouth every 6 hours as needed for pain   Refills:  0        * Notice:  This list has 3 medication(s) that are the same as other medications prescribed for you. Read the directions carefully, and ask your doctor or other care provider to review them with you.      Our records show that you are taking the medicines listed below. If these are incorrect, please call your family doctor or clinic.        Dose / Directions Last dose taken    atorvastatin 10 MG tablet   Commonly known as:  LIPITOR   Quantity:  90 tablet        TAKE ONE TABLET BY MOUTH ONE TIME DAILY   Refills:  2        lisinopril 20 MG tablet   Commonly known as:  PRINIVIL/ZESTRIL   Quantity:  90 tablet        TAKE ONE TABLET BY MOUTH ONE TIME DAILY   Refills:  3        metFORMIN 500 MG 24 hr tablet   Commonly known as:  GLUCOPHAGE-XR   Quantity:  360 tablet        TAKE FOUR TABLETS BY MOUTH DAILY   Refills:  PRN        order for DME   Quantity:  1 Device        Equipment being  ordered: CPAP   Refills:  0        UNKNOWN MED DOSAGE   Indication:  chemotherapeutic medication weekly - 12 pills        Refills:  0                Prescriptions were sent or printed at these locations (2 Prescriptions)                   Other Prescriptions                Printed at Department/Unit printer (2 of 2)         oxyCODONE (ROXICODONE) 5 MG IR tablet               gabapentin (NEURONTIN) 300 MG capsule                Procedures and tests performed during your visit     CBC with platelets differential    Comprehensive metabolic panel      Orders Needing Specimen Collection     None      Pending Results     No orders found from 3/8/2017 to 3/11/2017.            Pending Culture Results     No orders found from 3/8/2017 to 3/11/2017.            Thank you for choosing [facility]       Thank you for choosing [facility] for your care. Our goal is always to provide you with excellent care. Hearing back from our patients is one way we can continue to improve our services. Please take a few minutes to complete the written survey that you may receive in the mail after you visit with us. Thank you!        [employer] Information     [employer] gives you secure access to your electronic health record. If you see a primary care provider, you can also send messages to your care team and make appointments. If you have questions, please call your primary care clinic.  If you do not have a primary care provider, please call [phone] and they will assist you.        Care EveryWhere ID     This is your Care EveryWhere ID. This could be used by other organizations to access your [facility] medical records  [id number]        After Visit Summary       This is your record. Keep this with you and show to your community pharmacist(s) and doctor(s) at your next visit.

## 2017-03-12 ENCOUNTER — HOSPITAL ENCOUNTER (EMERGENCY)
Facility: CLINIC | Age: 62
Discharge: HOME | End: 2017-03-12
Attending: EMERGENCY MEDICINE | Admitting: EMERGENCY MEDICINE
Payer: COMMERCIAL

## 2017-03-12 VITALS
TEMPERATURE: 98.1 F | OXYGEN SATURATION: 95 % | HEART RATE: 98 BPM | RESPIRATION RATE: 16 BRPM | DIASTOLIC BLOOD PRESSURE: 88 MMHG | WEIGHT: 195 LBS | BODY MASS INDEX: 37.21 KG/M2 | SYSTOLIC BLOOD PRESSURE: 112 MMHG

## 2017-03-12 DIAGNOSIS — F32.89 OTHER DEPRESSION: ICD-10-CM

## 2017-03-12 DIAGNOSIS — G89.29 CHRONIC ABDOMINAL PAIN: ICD-10-CM

## 2017-03-12 DIAGNOSIS — R10.9 CHRONIC ABDOMINAL PAIN: ICD-10-CM

## 2017-03-12 PROCEDURE — 90791 PSYCH DIAGNOSTIC EVALUATION: CPT

## 2017-03-12 PROCEDURE — 99285 EMERGENCY DEPT VISIT HI MDM: CPT | Mod: 25 | Performed by: EMERGENCY MEDICINE

## 2017-03-12 PROCEDURE — 99284 EMERGENCY DEPT VISIT MOD MDM: CPT | Mod: Z6 | Performed by: EMERGENCY MEDICINE

## 2017-03-12 PROCEDURE — 25000132 ZZH RX MED GY IP 250 OP 250 PS 637: Performed by: EMERGENCY MEDICINE

## 2017-03-12 RX ORDER — OXYCODONE HYDROCHLORIDE 5 MG/1
10 TABLET ORAL ONCE
Status: COMPLETED | OUTPATIENT
Start: 2017-03-12 | End: 2017-03-12

## 2017-03-12 RX ADMIN — OXYCODONE HYDROCHLORIDE 10 MG: 5 TABLET ORAL at 18:14

## 2017-03-12 ASSESSMENT — ENCOUNTER SYMPTOMS
DYSPHORIC MOOD: 1
COUGH: 0
ABDOMINAL PAIN: 1
SHORTNESS OF BREATH: 0
FEVER: 0

## 2017-03-12 NOTE — ED AVS SNAPSHOT
[facility], Emergency Department    [address]    [place] [address]    Phone:  [phone]    Fax:  [phone]                                       Parmjit Hernández   MRN: [id number]    Department:  [facility], Emergency Department   Date of Visit:  3/12/2017           Patient Information     Date Of Birth          1955        Your diagnoses for this visit were:     Other depression     Chronic abdominal pain        You were seen by Emeterio Belcher MD.      Follow-up Information     Schedule an appointment as soon as possible for a visit with Spike Tobin MD.    Specialty:  Family Practice    Contact information:    [facility]  [address]  [address]  [facility] [address] [phone]          Follow up with [facility], Emergency Department.    Specialty:  EMERGENCY MEDICINE    Why:  If symptoms worsen    Contact information:    [address] [place] [address] [phone]    Additional information:    The [facility] is located in the [facility].  is easily accessible from virtually any point in the [facility] area, via [address]        Discharge Instructions         Depression  Depression is one of the most common mental health problems today. It is not just a state of unhappiness or sadness. It is a true disease. The cause seems to be related to a decrease in chemicals that transmit signals in the brain. Having a family history of depression, alcoholism, or suicide increases the risk. Chronic illness, chronic pain, migraine headaches and high emotional stress also increase the risk.  Depression is something we tend to recognize in others, but may have a hard time seeing in ourselves. It can show in many physical and emotional ways:    Loss of appetite    Over-eating    Not being able to sleep    Sleeping too much    Tiredness not related to physical exertion    Restlessness or irritability    Slowness of movement or  speech    Feeling depressed or withdrawn    Loss of interest in things you once enjoyed    Trouble concentrating, poor memory, trouble making decisions    Thoughts of harming or killing oneself, or thoughts that life is not worth living    Low self-esteem  The treatment for depression may include both medicine and psychotherapy. Antidepressants can reduce suffering and can improve the ability to function during the depressed period. Therapy can offer emotional support and help you understand emotional factors that may be causing the depression.  Home care    On-going care and support helps people manage this disease.  Find a healthcare provider and therapist who meet your needs. Seek help when you feel like you may be getting ill.    Be kind to yourself. Make it a point to do things that you enjoy (gardening, walking in nature, going to a movie, etc.). Reward yourself for small successes.    Take care of your physical body. Eat a balanced diet (low in saturated fat and high in fruits and vegetables). Exercise at least 3 times a week for 30 minutes. Even mild-moderate exercise (like brisk walking) can make you feel better.    Avoid alcohol, which can make depression worse.    Take medicine as prescribed.    Tell each of your healthcare providers about all of the prescription drugs, over-the-counter medicines, vitamins, and supplements you take. Certain supplements interact with medicines and can result in dangerous side effects. Ask your pharmacist when you have questions about drug interactions.    Talk with your family and trusted friends about your feelings and thoughts. Ask them to help you recognize behavior changes early so you can get help and, if needed, medicine can be adjusted.  Follow-up care  Follow up with your healthcare provider, or as advised.  Call 911  Call 911 if you:    Have suicidal thoughts, a suicide plan, and the means to carry out the plan    Have trouble breathing    Are very  confused    Feel very drowsy or have trouble awakening    Faint or lose consciousness    Have new chest pain that becomes more severe, lasts longer, or spreads into your shoulder, arm, neck, jaw or back  When to seek medical advice  Call your healthcare provider right away if any of these occur:    Feeling extreme depression, fear, anxiety, or anger toward yourself or others    Feeling out of control    Feeling that you may try to harm yourself or another    Hearing voices that others do not hear    Seeing things that others do not see    Can t sleep or eat for 3 days in a row    Friends or family express concern over your behavior and ask you to seek help    0932-1207 [employer] [address] [place] [address], [place] [address]. All rights reserved. This information is not intended as a substitute for professional medical care. Always follow your healthcare professional's instructions.          24 Hour Appointment Hotline       To make an appointment at any [facility], call [phone] ([phone]). If you don't have a family doctor or clinic, we will help you find one. [facility] clinics are conveniently located to serve the needs of you and your family.             Review of your medicines      Our records show that you are taking the medicines listed below. If these are incorrect, please call your family doctor or clinic.        Dose / Directions Last dose taken    atorvastatin 10 MG tablet   Commonly known as:  LIPITOR   Quantity:  90 tablet        TAKE ONE TABLET BY MOUTH ONE TIME DAILY   Refills:  2        gabapentin 300 MG capsule   Commonly known as:  NEURONTIN   Dose:  300 mg   Quantity:  45 capsule        Take 1 capsule (300 mg) by mouth 3 times daily for 15 days   Refills:  0        lisinopril 20 MG tablet   Commonly known as:  PRINIVIL/ZESTRIL   Quantity:  90 tablet        TAKE ONE TABLET BY MOUTH ONE TIME DAILY   Refills:  3        metFORMIN 500 MG 24 hr tablet   Commonly known as:   GLUCOPHAGE-XR   Quantity:  360 tablet        TAKE FOUR TABLETS BY MOUTH DAILY   Refills:  PRN        order for DME   Quantity:  1 Device        Equipment being ordered: CPAP   Refills:  0        * oxyCODONE 5 MG IR tablet   Commonly known as:  ROXICODONE   Quantity:  70 tablet        1-2 q 6 hrs prn pain. Maximum of 10 a day   Refills:  0        * oxyCODONE 5 MG IR tablet   Commonly known as:  ROXICODONE   Quantity:  70 tablet        1 q 4 hr prn pain up to 10 a day   Refills:  0        * oxyCODONE 5 MG IR tablet   Commonly known as:  ROXICODONE   Dose:  10 mg   Quantity:  20 tablet        Take 2 tablets (10 mg) by mouth every 6 hours as needed for pain   Refills:  0        UNKNOWN MED DOSAGE   Indication:  chemotherapeutic medication weekly - 12 pills        Refills:  0        * Notice:  This list has 3 medication(s) that are the same as other medications prescribed for you. Read the directions carefully, and ask your doctor or other care provider to review them with you.            Orders Needing Specimen Collection     Ordered          03/12/17 1910  Drug abuse screen 6 urine (chem dep) - ROUTINE, [facility]: Routine, Needs to be Collected     Scheduled Task Status   03/12/17 1911 Collect Drug abuse screen 6 urine (chem dep) Open   Order Class:  PCU Collect                  Pending Results     No orders found from 3/10/2017 to 3/13/2017.            Pending Culture Results     No orders found from 3/10/2017 to 3/13/2017.            Thank you for choosing [facility]       Thank you for choosing [facility] for your care. Our goal is always to provide you with excellent care. Hearing back from our patients is one way we can continue to improve our services. Please take a few minutes to complete the written survey that you may receive in the mail after you visit with us. Thank you!        [employer] Information     [employer] gives you secure access to your electronic health record. If you see a primary care provider, you can also  send messages to your care team and make appointments. If you have questions, please call your primary care clinic.  If you do not have a primary care provider, please call [phone] and they will assist you.        Care EveryWhere ID     This is your Care EveryWhere ID. This could be used by other organizations to access your [facility] medical records  [id number]        After Visit Summary       This is your record. Keep this with you and show to your community pharmacist(s) and doctor(s) at your next visit.

## 2017-03-12 NOTE — ED AVS SNAPSHOT
[facility], [place], Emergency Department    [address]    [facility] [address]    Phone:  [phone]    Fax:  [phone]                                       Parmjit Hernández   MRN: [id number]    Department:  [facility], Emergency Department   Date of Visit:  3/12/2017           After Visit Summary Signature Page     I have received my discharge instructions, and my questions have been answered. I have discussed any challenges I see with this plan with the nurse or doctor.    ..........................................................................................................................................  Patient/Patient Representative Signature      ..........................................................................................................................................  Patient Representative Print Name and Relationship to Patient    ..................................................               ................................................  Date                                            Time    ..........................................................................................................................................  Reviewed by Signature/Title    ...................................................              ..............................................  Date                                                            Time

## 2017-03-12 NOTE — ED PROVIDER NOTES
History     Chief Complaint   Patient presents with     Abdominal Pain     chronic pain in abd and legs, recent stemcell transplant and since has not taken meds , has been very depressed and stopped most self care.     Depression     HPI  Parmjit Hernández is a 61 year old male with a history of amyloidosis s/p recent SCT and chemotherapy, chronic abdominal pain, chronic nonalcoholic liver disease, hypertension, diabetes mellitus, bipolar 1 disorder, and depression who presents for evaluation for his depression. The patient presents with his daughter today.     The patient describes that he has a very longstanding history of depression, with highly adverse life events, including that of his sister having commit suicide in her 20s. He states that his chronic pain is truly compounding his depression. He reports that he does have days where he sits around and thinks passively about what it would be like to not be living, though he states explicitly that he does not have a plan or intention to commit suicide or otherwise harm himself. Still, he indicates that he would like to have his mental health fully addressed, alluding particularly to the possibility of becoming an inpatient so that his mental health, as well as physical health, may be fully addressed so that he can start working towards the right direction in life. Fortunately, the patient reports that he does have a very strong network of friends and family that he has been able to fall back on and which has really helped him recently. He denies HI or AVH.    Additionally, the patient reports that he has been struggling with intractable abdominal pain over the past 2 years. He has been following closely with his PCP, Osei Tobin MD, for this. The patient was diagnosed with amyloidosis and recently his cares have been additionally facilitated through the [facility]. In January, 2 months ago, the patient underwent stem-cell transplant and 2-day adjuvant  "chemotherapy for his amyloidosis. The patient states that he has not followed up with his aftercare medications, having not gotten these filled. He is scheduled to revisit [facility] in April for further evaluation and management. The patient feels that his recent cares at [facility] have had no effect on his chronic pain, and may have, in fact, only worsened matters. He describes that in the past 2 weeks he has had a recurrence of severe peripheral neuropathy, burning in his legs, which he reports that he was told by his specialists at [facility] that this could have been related to his recent SCT and chemotherapy. This neuropathy, combined with his chronic, constant abdominal pain, he states, is detrimentally impacting his mental health. The patient is on Neurontin, 100 mg TID, but he does not find this particularly effective. He states he was on Oxycontin last month, but he stopped \"cold turkey\" and has been without pain medication in the past month, indicating he was managing his pain with breathing exercises. The patient does note that he was seen and evaluated in this ED 2 days ago; he was given Dilaudid IV and oxycodone PO at that time, and he states that he found this intervention especially efficacious in abating his pain. Additionally, he was told to slowly start titrating up the gabapentin, which he is doing, though this hasn't changed his neuropathic pain yet at this time. At that time, labs were checked including a CMP and CBC and were unremarkable.    The patient denies any fevers or productive cough. He notes that he has had some nosebleeds recently this winter, which he indicates is unusual for him, as he rarely ever suffers from these. Other than his chronic pain and mental health concerns, the patient does not have any other specific complaints at this time.     SHx: The patient lives in [place].    I have reviewed the Medications, Allergies, Past Medical and Surgical History, and Social History in the Epic " system.    No current facility-administered medications for this encounter.      Current Outpatient Prescriptions   Medication     oxyCODONE (ROXICODONE) 5 MG IR tablet     gabapentin (NEURONTIN) 300 MG capsule     order for DME     atorvastatin (LIPITOR) 10 MG tablet     metFORMIN (GLUCOPHAGE-XR) 500 MG 24 hr tablet     lisinopril (PRINIVIL,ZESTRIL) 20 MG tablet     oxyCODONE (ROXICODONE) 5 MG immediate release tablet     oxyCODONE (ROXICODONE) 5 MG immediate release tablet     UNKNOWN MED DOSAGE     Past Medical History   Diagnosis Date     Allergic state      Amyloidosis (H)      Diabetes mellitus (H)      type 2     Headache(784.0)      Hypertension 2007     Myalgia and myositis, unspecified      Obsessive-compulsive disorders      Other acquired absence of organ 94     Other specified viral warts      Pain in the abdomen      Pure hypercholesterolemia        Past Surgical History   Procedure Laterality Date     C nonspecific procedure  94     Cholecystectomy     Hernia repair, umbilical  2006     C nonspecific procedure  2000     repair deviated septum     Cholecystectomy  1995     lap qian     Colonoscopy  2012     hx polyps     Esophagoscopy, gastroscopy, duodenoscopy (egd), combined N/A 5/29/2015     Procedure: COMBINED ESOPHAGOSCOPY, GASTROSCOPY, DUODENOSCOPY (EGD), BIOPSY SINGLE OR MULTIPLE;  Surgeon: John Jacob MD;  Location:  GI     Bone marrow biopsy, bone specimen, needle/trocar N/A 6/8/2016     Procedure: BIOPSY BONE MARROW;  Surgeon: Nathan Agrawal MD;  Location:  GI       Family History   Problem Relation Age of Onset     CEREBROVASCULAR DISEASE Mother      C.A.D. Mother      Hypertension Mother      Alcohol/Drug Mother      Arthritis Mother      CEREBROVASCULAR DISEASE Maternal Grandmother      C.A.D. Maternal Grandmother      Alcohol/Drug Maternal Grandmother      C.A.D. Father      HEART DISEASE Father      Hypertension Father      Alcohol/Drug Father      Allergies  Father      Circulatory Father      Depression Father      Respiratory Father      DIABETES Brother      Alcohol/Drug Paternal Grandfather      CEREBROVASCULAR DISEASE Paternal Grandfather      Allergies Sister      Depression Sister      Gynecology Sister      Depression Son      Psychotic Disorder Son      anxiety disorder     Depression Daughter      CEREBROVASCULAR DISEASE Maternal Grandfather      CEREBROVASCULAR DISEASE Paternal Grandmother      Coronary Artery Disease No family hx of      Hyperlipidemia No family hx of      Breast Cancer No family hx of      Colon Cancer No family hx of      Prostate Cancer No family hx of      Other Cancer No family hx of      Anxiety Disorder No family hx of      MENTAL ILLNESS No family hx of      Substance Abuse No family hx of      Anesthesia Reaction No family hx of      Asthma No family hx of      OSTEOPOROSIS No family hx of      Genetic Disorder No family hx of      Thyroid Disease No family hx of      Obesity No family hx of      Unknown/Adopted No family hx of        Social History   Substance Use Topics     Smoking status: Never Smoker     Smokeless tobacco: Never Used     Alcohol use No      Comment: rarely        Allergies   Allergen Reactions     Sulfa Drugs Swelling     Pt has taken  Taken sulfa drugs orally without trouble. He had problems with Sulfa eye drops. Eye swelled up     Victoza      Increased migraine frequency and severity       Review of Systems   Constitutional: Negative for fever.   HENT: Positive for nosebleeds (recent episodes of).    Respiratory: Negative for cough and shortness of breath.    Cardiovascular: Negative for chest pain.   Gastrointestinal: Positive for abdominal pain (chronic).   Neurological:        BLE neuropathy, burning pain   Psychiatric/Behavioral: Positive for dysphoric mood and suicidal ideas (passive). Negative for self-injury.   All other systems reviewed and are negative.      Physical Exam   BP: (!) 139/96  Pulse:  97  Temp: 96.6  F (35.9  C)  Resp: 16  Weight: 88.5 kg (195 lb)  SpO2: 98 %  Physical Exam    ED Course     ED Course     6:03 PM  The patient was seen and examined by Emeterio Belcher MD, in Room 09.     Procedures           Labs Ordered and Resulted from Time of ED Arrival Up to the Time of Departure from the ED - No data to display    Assessments & Plan (with Medical Decision Making)   Parmjit Hernández is a 61 year old male with a hx/o amyloidosis s/p recent SCT and chemotherapy, chronic abdominal pain, chronic nonalcoholic liver disease, hypertension, diabetes mellitus, bipolar 1 disorder, and depression who presents for evaluation of his mental health status, as he feels like he is not coping well and is considering inpatient options. I do feel that the patient is depressed, and is intermittently having passive suicidal ideation, but fortunately no active thoughts of SI/HI and has some protective factors including forward/goal-directed thinking (medical appts with April) and a very supportive network of family/friends. His chronic pain is certainly adding to his stress. I feel that he is medically cleared for evaluation by our behavioral health assessors and would likely benefit from close outpatient treatment rather than an inpatient admission.     As far as his chronic abdominal pain, I feel that the patient would best be served by following up with a PCP for this issue. I explained that I was concerned about his use of the Emergency Department (twice in two days now) and asking for IV opioid medications. I explained that the ED would no longer provide IV doses or prescriptions for opioid medications but that he should make an appointment with his PCP for continued management of this issue. The patient is afebrile, with normal vitals and a benign abdomen, and so I have a low suspicion for acute intraabdominal pathology or any metabolic derangements based on recent normal labs. I did offer to give him a one time  dose of oxycodone here in the ED, but will not prescribe him anything to go home with.     As far as his neuropathic BLE pain, I agree with the plan to have him slowly start titrating up his gabapentin medication. He is currently taken 100 TID, took 200mg doses today (x2) and I encouraged him to continue this regiment and slowly titrate up for better control. He should be followed by his PCP for continued management of this issue. There is no acute component, no changes, no evidence of cellulitis or trauma of his lower extremities.       After H&P, he was given 10mg PO oxycodone and the mental health  was able to meet with him. They came up with a plan to have the family contact [facility] for consideration of TCU or NH level care which I think is very reasonable. The patient does have a very concerned and helpful family with him and they will help take care of him until these services are established, and from a depression standpoint, the patient will call a crisis line should he develop and active thoughts of SI/HI. Patient is well appearing and safe for discharge at this time, will f/u with PCP and was discharged to home with RTED precautions.     I have reviewed the nursing notes.    I have reviewed the findings, diagnosis, plan and need for follow up with the patient.    New Prescriptions    No medications on file       Final diagnoses:   Other depression   Chronic abdominal pain     I, Bebeto Ornelas, am serving as a trained medical scribe to document services personally performed by Emeterio Belcher MD, based on the provider's statements to me.      Emeterio SPRINGER MD, was physically present and have reviewed and verified the accuracy of this note documented by Bebeto Ornelas.    3/12/2017   [facility], EMERGENCY DEPARTMENT     Emeterio Belcher MD  03/12/17 1959

## 2017-03-13 NOTE — DISCHARGE INSTRUCTIONS

## 2017-03-14 ENCOUNTER — TELEPHONE (OUTPATIENT)
Dept: FAMILY MEDICINE | Facility: CLINIC | Age: 62
End: 2017-03-14

## 2017-03-14 NOTE — TELEPHONE ENCOUNTER
Reason for Call:  Other information    Detailed comments:  Patient son Dhruv to let Dr. Tobin know what is going on with his fathers health  He thinks he is abusing the oxycodone a medication given to him from many different places.  He does not seems to be recovering from his bone bia rial transplant nor is he taking care of himself  He is not eating nor washing him self or his clothing . He is showings signs of mental health issues. He is very depressed.  He has been to the ER for pain multiple times. The last time he was there the Phycologist , state that he need to get in touch with his PCP  So that he could be evaluated for assist living.    Phone Number Patient can be reached at: Other phone number:   [phone] Emanuel the son    Best Time:  anytime    Can we leave a detailed message on this number? YES    Call taken on 3/14/2017 at 5:10 PM by Melvina Sainz

## 2017-03-14 NOTE — TELEPHONE ENCOUNTER
MP,  Please see below message   Would care coordination be helpful with below concerns?  Please advise.  Stacie MAYA RN

## 2017-03-15 ENCOUNTER — MYC MEDICAL ADVICE (OUTPATIENT)
Dept: FAMILY MEDICINE | Facility: CLINIC | Age: 62
End: 2017-03-15

## 2017-03-15 RX ORDER — OXYCODONE HYDROCHLORIDE 5 MG/1
10 TABLET ORAL EVERY 6 HOURS PRN
Qty: 20 TABLET | Refills: 0 | Status: CANCELLED | OUTPATIENT
Start: 2017-03-15

## 2017-03-18 ENCOUNTER — HOSPITAL ENCOUNTER (EMERGENCY)
Facility: CLINIC | Age: 62
Discharge: HOME | End: 2017-03-18
Attending: EMERGENCY MEDICINE | Admitting: EMERGENCY MEDICINE
Payer: COMMERCIAL

## 2017-03-18 VITALS
OXYGEN SATURATION: 95 % | TEMPERATURE: 98.3 F | SYSTOLIC BLOOD PRESSURE: 146 MMHG | DIASTOLIC BLOOD PRESSURE: 114 MMHG | RESPIRATION RATE: 18 BRPM

## 2017-03-18 VITALS
WEIGHT: 195 LBS | TEMPERATURE: 97.8 F | SYSTOLIC BLOOD PRESSURE: 170 MMHG | OXYGEN SATURATION: 100 % | BODY MASS INDEX: 30.61 KG/M2 | RESPIRATION RATE: 20 BRPM | HEIGHT: 67 IN | DIASTOLIC BLOOD PRESSURE: 117 MMHG

## 2017-03-18 DIAGNOSIS — G62.9 PERIPHERAL POLYNEUROPATHY: ICD-10-CM

## 2017-03-18 DIAGNOSIS — R10.11 RUQ ABDOMINAL PAIN: ICD-10-CM

## 2017-03-18 LAB
ALBUMIN SERPL-MCNC: 4.2 G/DL (ref 3.4–5)
ALP SERPL-CCNC: 122 U/L (ref 40–150)
ALT SERPL W P-5'-P-CCNC: 27 U/L (ref 0–70)
ANION GAP SERPL CALCULATED.3IONS-SCNC: 11 MMOL/L (ref 3–14)
AST SERPL W P-5'-P-CCNC: 14 U/L (ref 0–45)
BASOPHILS # BLD AUTO: 0 10E9/L (ref 0–0.2)
BASOPHILS NFR BLD AUTO: 0.2 %
BILIRUB SERPL-MCNC: 2.5 MG/DL (ref 0.2–1.3)
BUN SERPL-MCNC: 8 MG/DL (ref 7–30)
CALCIUM SERPL-MCNC: 9.3 MG/DL (ref 8.5–10.1)
CHLORIDE SERPL-SCNC: 100 MMOL/L (ref 94–109)
CO2 SERPL-SCNC: 24 MMOL/L (ref 20–32)
CREAT SERPL-MCNC: 0.65 MG/DL (ref 0.66–1.25)
DIFFERENTIAL METHOD BLD: ABNORMAL
EOSINOPHIL # BLD AUTO: 0.1 10E9/L (ref 0–0.7)
EOSINOPHIL NFR BLD AUTO: 0.5 %
ERYTHROCYTE [DISTWIDTH] IN BLOOD BY AUTOMATED COUNT: 15.6 % (ref 10–15)
GFR SERPL CREATININE-BSD FRML MDRD: ABNORMAL ML/MIN/1.7M2
GLUCOSE SERPL-MCNC: 140 MG/DL (ref 70–99)
HCT VFR BLD AUTO: 37.2 % (ref 40–53)
HGB BLD-MCNC: 13.9 G/DL (ref 13.3–17.7)
IMM GRANULOCYTES # BLD: 0.1 10E9/L (ref 0–0.4)
IMM GRANULOCYTES NFR BLD: 0.5 %
LIPASE SERPL-CCNC: 77 U/L (ref 73–393)
LYMPHOCYTES # BLD AUTO: 0.8 10E9/L (ref 0.8–5.3)
LYMPHOCYTES NFR BLD AUTO: 8.1 %
MCH RBC QN AUTO: 32.9 PG (ref 26.5–33)
MCHC RBC AUTO-ENTMCNC: 37.4 G/DL (ref 31.5–36.5)
MCV RBC AUTO: 88 FL (ref 78–100)
MONOCYTES # BLD AUTO: 0.6 10E9/L (ref 0–1.3)
MONOCYTES NFR BLD AUTO: 5.9 %
NEUTROPHILS # BLD AUTO: 8 10E9/L (ref 1.6–8.3)
NEUTROPHILS NFR BLD AUTO: 84.8 %
NRBC # BLD AUTO: 0 10*3/UL
NRBC BLD AUTO-RTO: 0 /100
PLATELET # BLD AUTO: 91 10E9/L (ref 150–450)
POTASSIUM SERPL-SCNC: 3.5 MMOL/L (ref 3.4–5.3)
PROT SERPL-MCNC: 7 G/DL (ref 6.8–8.8)
RBC # BLD AUTO: 4.23 10E12/L (ref 4.4–5.9)
SODIUM SERPL-SCNC: 135 MMOL/L (ref 133–144)
WBC # BLD AUTO: 9.5 10E9/L (ref 4–11)

## 2017-03-18 PROCEDURE — 99207 ZZC APP CREDIT; MD BILLING SHARED VISIT: CPT | Mod: Z6 | Performed by: EMERGENCY MEDICINE

## 2017-03-18 PROCEDURE — 83690 ASSAY OF LIPASE: CPT | Performed by: EMERGENCY MEDICINE

## 2017-03-18 PROCEDURE — 99284 EMERGENCY DEPT VISIT MOD MDM: CPT | Mod: 25 | Performed by: EMERGENCY MEDICINE

## 2017-03-18 PROCEDURE — 25000132 ZZH RX MED GY IP 250 OP 250 PS 637: Performed by: EMERGENCY MEDICINE

## 2017-03-18 PROCEDURE — 25000128 H RX IP 250 OP 636: Performed by: EMERGENCY MEDICINE

## 2017-03-18 PROCEDURE — 96374 THER/PROPH/DIAG INJ IV PUSH: CPT | Performed by: EMERGENCY MEDICINE

## 2017-03-18 PROCEDURE — 80053 COMPREHEN METABOLIC PANEL: CPT | Performed by: EMERGENCY MEDICINE

## 2017-03-18 PROCEDURE — 85025 COMPLETE CBC W/AUTO DIFF WBC: CPT | Performed by: EMERGENCY MEDICINE

## 2017-03-18 PROCEDURE — 96361 HYDRATE IV INFUSION ADD-ON: CPT | Performed by: EMERGENCY MEDICINE

## 2017-03-18 PROCEDURE — 99284 EMERGENCY DEPT VISIT MOD MDM: CPT | Mod: Z6 | Performed by: EMERGENCY MEDICINE

## 2017-03-18 RX ORDER — ACETAMINOPHEN 325 MG/1
975 TABLET ORAL ONCE
Status: COMPLETED | OUTPATIENT
Start: 2017-03-18 | End: 2017-03-18

## 2017-03-18 RX ORDER — SODIUM CHLORIDE 9 MG/ML
INJECTION, SOLUTION INTRAVENOUS CONTINUOUS
Status: DISCONTINUED | OUTPATIENT
Start: 2017-03-18 | End: 2017-03-18 | Stop reason: HOSPADM

## 2017-03-18 RX ORDER — GABAPENTIN 400 MG/1
400 CAPSULE ORAL 3 TIMES DAILY
Qty: 60 CAPSULE | Refills: 0 | Status: SHIPPED | OUTPATIENT
Start: 2017-03-18 | End: 2017-05-03 | Stop reason: DRUGHIGH

## 2017-03-18 RX ORDER — GABAPENTIN 300 MG/1
600 CAPSULE ORAL ONCE
Status: COMPLETED | OUTPATIENT
Start: 2017-03-18 | End: 2017-03-18

## 2017-03-18 RX ORDER — KETOROLAC TROMETHAMINE 30 MG/ML
30 INJECTION, SOLUTION INTRAMUSCULAR; INTRAVENOUS ONCE
Status: COMPLETED | OUTPATIENT
Start: 2017-03-18 | End: 2017-03-18

## 2017-03-18 RX ORDER — HYDROCODONE BITARTRATE AND ACETAMINOPHEN 5; 325 MG/1; MG/1
2 TABLET ORAL ONCE
Status: COMPLETED | OUTPATIENT
Start: 2017-03-18 | End: 2017-03-18

## 2017-03-18 RX ADMIN — SODIUM CHLORIDE: 9 INJECTION, SOLUTION INTRAVENOUS at 00:46

## 2017-03-18 RX ADMIN — GABAPENTIN 600 MG: 300 CAPSULE ORAL at 15:41

## 2017-03-18 RX ADMIN — ACETAMINOPHEN 975 MG: 325 TABLET, FILM COATED ORAL at 00:46

## 2017-03-18 RX ADMIN — KETOROLAC TROMETHAMINE 30 MG: 30 INJECTION, SOLUTION INTRAMUSCULAR at 00:47

## 2017-03-18 RX ADMIN — HYDROCODONE BITARTRATE AND ACETAMINOPHEN 2 TABLET: 5; 325 TABLET ORAL at 02:06

## 2017-03-18 ASSESSMENT — ENCOUNTER SYMPTOMS
ABDOMINAL PAIN: 1
FEVER: 0
MUSCULOSKELETAL NEGATIVE: 1
RESPIRATORY NEGATIVE: 1
APPETITE CHANGE: 1
CARDIOVASCULAR NEGATIVE: 1

## 2017-03-18 NOTE — DISCHARGE INSTRUCTIONS
Please make an appointment to follow up with Your Primary Care Provider in 7 days even if entirely better.  You can call to discuss the appropriate follow up timing with your doctor.         Peripheral Neuropathy  Peripheral neuropathy is a condition that affects the nerves of the arms or legs. It causes a change in physical feeling. Sometimes it causes weakness in the muscles. You may feel tingling, numbness or shooting pains. Symptoms may be more common at night). Skin may be extra sensitive to light touch or temperature changes.  Neuropathy may be a complication of a chronic disease such as diabetes. A ruptured disk with pressure on the spinal nerve may also lead to the problem. Certain vitamin deficiencies may lead to it. It may also be caused by exposure to certain drugs or chemicals   Home care    Tell the healthcare provider about all medicines you take. This includes prescription and over-the-counter medicines, vitamins, and herbs. Ask if any of the medicines may be causing your problems. Do not make any changes to prescription medicines without talking to your healthcare provider first.     You may be prescribed medicines to help relieve the tingling feeling or for pain. Take all medicines as directed.    A numb hand or foot may be more prone to injury. To help protect it:    Always use oven mitts.    Test water with an unaffected hand or foot.     Use caution when trimming nails. File sharp areas.    Wear shoes that fit well to avoid pressure points, blisters, and ulcers.    Inspect your hands and feet carefully (including the soles of your feet and between your toes) at least once a week. If you see red areas, sores, or other problems, tell your healthcare provider.  Follow-up care  Follow up with your doctor or as advised by our staff. You may need further testing or evaluation.  When to seek medical advice  Call your healthcare provider right away if any of the following occur:    Redness, swelling,  cracking, or ulcer on any numb area, especially the feet    New symptoms of numbness or muscle weakness numbness    Loss of bowel or bladder control     Slurred speech, confusion, or trouble speaking, walking, or seeing    2432-2603 [employer]. [address] [place] [address]. All rights reserved. This information is not intended as a substitute for professional medical care. Always follow your healthcare professional's instructions.

## 2017-03-18 NOTE — ED NOTES
Bed: ED04  Expected date: 3/17/17  Expected time: 11:52 PM  Means of arrival: Ambulance  Comments:  [address]  61 M  Abd pain

## 2017-03-18 NOTE — ED PROVIDER NOTES
History     Chief Complaint   Patient presents with     Abdominal Pain     RUQ burning pain, going for a few days but getting worst. hurts when he eats, feels weak, not eating enough. hx HTN and type 2 DM, ran out of meds 4 days ago     HPI  Parmjit Hernández is a 61 year old male with a history of amyloidosis s/p recent SCT and chemotherapy, chronic abdominal pain, chronic nonalcoholic liver disease, hypertension, diabetes mellitus, and frequent ED visits who presents to the ED via EMS for evaluation of abdominal pain. The patient presents with another episode of abdominal pain that began a few days ago and worsened today. He states he has been worked up multiple times for his abdominal pain and his symptoms are thought to be related to his amyloidosis diagnosis. He states his last episode of this severity was over a month ago. He denies alcohol use.      Patient has been seen here in this ED multiple times in the past month with multiple complaints. He was seen twice last month with very similar presentation. He had labs and CT done which were unremarkable.            I have reviewed the Medications, Allergies, Past Medical and Surgical History, and Social History in the [employer] system.  PAST MEDICAL HISTORY:   Past Medical History   Diagnosis Date     Allergic state      Amyloidosis (H)      Diabetes mellitus (H)      type 2     Headache(784.0)      Hypertension 2007     Myalgia and myositis, unspecified      Obsessive-compulsive disorders      Other acquired absence of organ 94     Other specified viral warts      Pain in the abdomen      Pure hypercholesterolemia        PAST SURGICAL HISTORY:   Past Surgical History   Procedure Laterality Date     C nonspecific procedure  94     Cholecystectomy     Hernia repair, umbilical  2006     C nonspecific procedure  2000     repair deviated septum     Cholecystectomy  1995     lap qian     Colonoscopy  2012     hx polyps     Esophagoscopy, gastroscopy, duodenoscopy (egd),  combined N/A 5/29/2015     Procedure: COMBINED ESOPHAGOSCOPY, GASTROSCOPY, DUODENOSCOPY (EGD), BIOPSY SINGLE OR MULTIPLE;  Surgeon: John Jacob MD;  Location:  GI     Bone marrow biopsy, bone specimen, needle/trocar N/A 6/8/2016     Procedure: BIOPSY BONE MARROW;  Surgeon: Nathan Agrawal MD;  Location:  GI       FAMILY HISTORY:   Family History   Problem Relation Age of Onset     CEREBROVASCULAR DISEASE Mother      C.A.D. Mother      Hypertension Mother      Alcohol/Drug Mother      Arthritis Mother      CEREBROVASCULAR DISEASE Maternal Grandmother      C.A.D. Maternal Grandmother      Alcohol/Drug Maternal Grandmother      C.A.D. Father      HEART DISEASE Father      Hypertension Father      Alcohol/Drug Father      Allergies Father      Circulatory Father      Depression Father      Respiratory Father      DIABETES Brother      Alcohol/Drug Paternal Grandfather      CEREBROVASCULAR DISEASE Paternal Grandfather      Allergies Sister      Depression Sister      Gynecology Sister      Depression Son      Psychotic Disorder Son      anxiety disorder     Depression Daughter      CEREBROVASCULAR DISEASE Maternal Grandfather      CEREBROVASCULAR DISEASE Paternal Grandmother      Coronary Artery Disease No family hx of      Hyperlipidemia No family hx of      Breast Cancer No family hx of      Colon Cancer No family hx of      Prostate Cancer No family hx of      Other Cancer No family hx of      Anxiety Disorder No family hx of      MENTAL ILLNESS No family hx of      Substance Abuse No family hx of      Anesthesia Reaction No family hx of      Asthma No family hx of      OSTEOPOROSIS No family hx of      Genetic Disorder No family hx of      Thyroid Disease No family hx of      Obesity No family hx of      Unknown/Adopted No family hx of        SOCIAL HISTORY:   Social History   Substance Use Topics     Smoking status: Never Smoker     Smokeless tobacco: Never Used     Alcohol use No       "Comment: rarely     Current Facility-Administered Medications   Medication     0.9% sodium chloride infusion     0.9% sodium chloride infusion     lidocaine (XYLOCAINE) 2 % 15 mL, alum & mag hydroxide-simethicone (MYLANTA ES/MAALOX  ES) 15 mL GI Cocktail     Current Outpatient Prescriptions   Medication     gabapentin (NEURONTIN) 300 MG capsule     order for DME     atorvastatin (LIPITOR) 10 MG tablet     metFORMIN (GLUCOPHAGE-XR) 500 MG 24 hr tablet     lisinopril (PRINIVIL,ZESTRIL) 20 MG tablet     oxyCODONE (ROXICODONE) 5 MG IR tablet     oxyCODONE (ROXICODONE) 5 MG immediate release tablet     oxyCODONE (ROXICODONE) 5 MG immediate release tablet     UNKNOWN MED DOSAGE        Allergies   Allergen Reactions     Sulfa Drugs Swelling     Pt has taken  Taken sulfa drugs orally without trouble. He had problems with Sulfa eye drops. Eye swelled up     Victoza      Increased migraine frequency and severity         Review of Systems   Constitutional: Positive for appetite change. Negative for fever.   Respiratory: Negative.    Cardiovascular: Negative.    Gastrointestinal: Positive for abdominal pain (RUQ).   Genitourinary: Negative.    Musculoskeletal: Negative.    Skin: Negative.    All other systems reviewed and are negative.      Physical Exam   BP: (!) 170/117  Heart Rate: 84  Temp: 97.8  F (36.6  C)  Resp: 20  Height: 170.2 cm (5' 7\")  Weight: 88.5 kg (195 lb)  SpO2: 100 %  Physical Exam   Constitutional: He is oriented to person, place, and time. He appears well-developed and well-nourished. No distress.   HENT:   Mouth/Throat: Oropharynx is clear and moist.   Eyes: Pupils are equal, round, and reactive to light.   Cardiovascular: Normal rate, regular rhythm and normal heart sounds.    No murmur heard.  Pulmonary/Chest: Effort normal and breath sounds normal.   Abdominal: Soft. He exhibits no mass. There is tenderness in the right upper quadrant. There is no rebound and no guarding.       Neurological: He is alert " and oriented to person, place, and time.   Skin: Skin is warm.   Psychiatric: He has a normal mood and affect. His behavior is normal.   Nursing note and vitals reviewed.      ED Course     ED Course     Procedures       12:33 AM  The patient was seen and examined by Dr. Alamo in Room 4.     Medications   0.9% sodium chloride infusion ( Intravenous Stopped 3/18/17 0211)   0.9% sodium chloride infusion (not administered)   lidocaine (XYLOCAINE) 2 % 15 mL, alum & mag hydroxide-simethicone (MYLANTA ES/MAALOX  ES) 15 mL GI Cocktail (30 mLs Oral Not Given 3/18/17 0211)   ketorolac (TORADOL) injection 30 mg (30 mg Intravenous Given 3/18/17 0047)   acetaminophen (TYLENOL) tablet 975 mg (975 mg Oral Given 3/18/17 0046)   HYDROcodone-acetaminophen (NORCO) 5-325 MG per tablet 2 tablet (2 tablets Oral Given 3/18/17 0206)     2:26 AM patient reports being ready for discharge         Labs Ordered and Resulted from Time of ED Arrival Up to the Time of Departure from the ED   CBC WITH PLATELETS DIFFERENTIAL - Abnormal; Notable for the following:        Result Value    RBC Count 4.23 (*)     Hematocrit 37.2 (*)     MCHC 37.4 (*)     RDW 15.6 (*)     Platelet Count 91 (*)     All other components within normal limits   COMPREHENSIVE METABOLIC PANEL - Abnormal; Notable for the following:     Glucose 140 (*)     Creatinine 0.65 (*)     Bilirubin Total 2.5 (*)     All other components within normal limits   LIPASE       Assessments & Plan (with Medical Decision Making)   This is his 5th visit in 1 month to the emergency department previously he has received IV Dilaudid.  One previous problem provider declined.  He's had labs and imaging done including a CT scan which is normal and he's had a cholecystectomy in the past.  He complains of right upper quadrant epigastric pain today.  No evidence for GI bleeding his labs are normal no evidence for pancreatitis he repeatedly asked for IV Dilaudid and this was declined agreed to give  him 2 Vicodin and nothing to be discharged with he does have a history of opiate dependence.  He should follow up with his primary care provider.    I have reviewed the nursing notes.    I have reviewed the findings, diagnosis, plan and need for follow up with the patient.    New Prescriptions    No medications on file       Final diagnoses:   RUQ abdominal pain       Vanessa SPRINGER , am serving as a trained medical scribe to document services personally performed by Parmjit Alamo MD, based on the provider's statements to me.   IParmjit MD, was physically present and have reviewed and verified the accuracy of this note documented by Vanessa Peralta.      3/18/2017   [facility], [place], EMERGENCY DEPART     Parmjit Alamo MD  03/18/17 0232

## 2017-03-18 NOTE — ED AVS SNAPSHOT
[facility], [place], Emergency Department    [address]    [facility] [address]    Phone:  [phone]    Fax:  [phone]                                       Parmjit Hernández   MRN: [id number]    Department:  [facility], Emergency Department   Date of Visit:  3/18/2017           After Visit Summary Signature Page     I have received my discharge instructions, and my questions have been answered. I have discussed any challenges I see with this plan with the nurse or doctor.    ..........................................................................................................................................  Patient/Patient Representative Signature      ..........................................................................................................................................  Patient Representative Print Name and Relationship to Patient    ..................................................               ................................................  Date                                            Time    ..........................................................................................................................................  Reviewed by Signature/Title    ...................................................              ..............................................  Date                                                            Time

## 2017-03-18 NOTE — ED AVS SNAPSHOT
[facility], [place], Emergency Department    [address]    [facility] [address]    Phone:  [phone]    Fax:  [phone]                                       Parmjit Hernández   MRN: [id number]    Department:  [facility], Emergency Department   Date of Visit:  3/18/2017           After Visit Summary Signature Page     I have received my discharge instructions, and my questions have been answered. I have discussed any challenges I see with this plan with the nurse or doctor.    ..........................................................................................................................................  Patient/Patient Representative Signature      ..........................................................................................................................................  Patient Representative Print Name and Relationship to Patient    ..................................................               ................................................  Date                                            Time    ..........................................................................................................................................  Reviewed by Signature/Title    ...................................................              ..............................................  Date                                                            Time

## 2017-03-18 NOTE — DISCHARGE INSTRUCTIONS
Your labs were normal and previous imaging done at the time of other ER visits has been normal.  There is no clear cause for your symptoms, talk to your primary care provider.  You may use Tylenol or ibuprofen for your pain

## 2017-03-18 NOTE — ED AVS SNAPSHOT
[facility], Emergency Department    [address]    [place] [address]    Phone:  [phone]    Fax:  [phone]                                       Parmjit Hernández   MRN: [id number]    Department:  [facility], Emergency Department   Date of Visit:  3/18/2017           Patient Information     Date Of Birth          1955        Your diagnoses for this visit were:     Peripheral polyneuropathy (H)        You were seen by Nayana Dong MD and Robbin Moore MD.        Discharge Instructions       Please make an appointment to follow up with Your Primary Care Provider in 7 days even if entirely better.  You can call to discuss the appropriate follow up timing with your doctor.         Peripheral Neuropathy  Peripheral neuropathy is a condition that affects the nerves of the arms or legs. It causes a change in physical feeling. Sometimes it causes weakness in the muscles. You may feel tingling, numbness or shooting pains. Symptoms may be more common at night). Skin may be extra sensitive to light touch or temperature changes.  Neuropathy may be a complication of a chronic disease such as diabetes. A ruptured disk with pressure on the spinal nerve may also lead to the problem. Certain vitamin deficiencies may lead to it. It may also be caused by exposure to certain drugs or chemicals   Home care    Tell the healthcare provider about all medicines you take. This includes prescription and over-the-counter medicines, vitamins, and herbs. Ask if any of the medicines may be causing your problems. Do not make any changes to prescription medicines without talking to your healthcare provider first.     You may be prescribed medicines to help relieve the tingling feeling or for pain. Take all medicines as directed.    A numb hand or foot may be more prone to injury. To help protect it:    Always use oven mitts.    Test water with an unaffected hand or foot.     Use caution when trimming nails. File  sharp areas.    Wear shoes that fit well to avoid pressure points, blisters, and ulcers.    Inspect your hands and feet carefully (including the soles of your feet and between your toes) at least once a week. If you see red areas, sores, or other problems, tell your healthcare provider.  Follow-up care  Follow up with your doctor or as advised by our staff. You may need further testing or evaluation.  When to seek medical advice  Call your healthcare provider right away if any of the following occur:    Redness, swelling, cracking, or ulcer on any numb area, especially the feet    New symptoms of numbness or muscle weakness numbness    Loss of bowel or bladder control     Slurred speech, confusion, or trouble speaking, walking, or seeing    0511-6568 [employer]. [address] [place] [address]. All rights reserved. This information is not intended as a substitute for professional medical care. Always follow your healthcare professional's instructions.          Future Appointments        Provider Department Dept Phone Center    3/22/2017 10:00 AM Spike Tobin MD [facility] [phone] UP      24 Hour Appointment Hotline       To make an appointment at any [facility], call [phone] ([phone]). If you don't have a family doctor or clinic, we will help you find one. [facility] are conveniently located to serve the needs of you and your family.             Review of your medicines      CONTINUE these medicines which may have CHANGED, or have new prescriptions. If we are uncertain of the size of tablets/capsules you have at home, strength may be listed as something that might have changed.        Dose / Directions Last dose taken    gabapentin 400 MG capsule   Commonly known as:  NEURONTIN   Dose:  400 mg   What changed:    - medication strength  - how much to take   Quantity:  60 capsule        Take 1 capsule (400 mg) by mouth 3 times daily   Refills:  0           Our records show that you are taking the medicines listed below. If these are incorrect, please call your family doctor or clinic.        Dose / Directions Last dose taken    atorvastatin 10 MG tablet   Commonly known as:  LIPITOR   Quantity:  90 tablet        TAKE ONE TABLET BY MOUTH ONE TIME DAILY   Refills:  2        lisinopril 20 MG tablet   Commonly known as:  PRINIVIL/ZESTRIL   Quantity:  90 tablet        TAKE ONE TABLET BY MOUTH ONE TIME DAILY   Refills:  3        metFORMIN 500 MG 24 hr tablet   Commonly known as:  GLUCOPHAGE-XR   Quantity:  360 tablet        TAKE FOUR TABLETS BY MOUTH DAILY   Refills:  PRN        order for DME   Quantity:  1 Device        Equipment being ordered: CPAP   Refills:  0        * oxyCODONE 5 MG IR tablet   Commonly known as:  ROXICODONE   Quantity:  70 tablet        1-2 q 6 hrs prn pain. Maximum of 10 a day   Refills:  0        * oxyCODONE 5 MG IR tablet   Commonly known as:  ROXICODONE   Quantity:  70 tablet        1 q 4 hr prn pain up to 10 a day   Refills:  0        * oxyCODONE 5 MG IR tablet   Commonly known as:  ROXICODONE   Dose:  10 mg   Quantity:  20 tablet        Take 2 tablets (10 mg) by mouth every 6 hours as needed for pain   Refills:  0        UNKNOWN MED DOSAGE   Indication:  chemotherapeutic medication weekly - 12 pills        Refills:  0        * Notice:  This list has 3 medication(s) that are the same as other medications prescribed for you. Read the directions carefully, and ask your doctor or other care provider to review them with you.            Prescriptions were sent or printed at these locations (1 Prescription)                   Other Prescriptions                Printed at Department/Unit printer (1 of 1)         gabapentin (NEURONTIN) 400 MG capsule                Orders Needing Specimen Collection     None      Pending Results     No orders found from 3/16/2017 to 3/19/2017.            Pending Culture Results     No orders found from 3/16/2017  to 3/19/2017.            Thank you for choosing [facility]       Thank you for choosing [facility] for your care. Our goal is always to provide you with excellent care. Hearing back from our patients is one way we can continue to improve our services. Please take a few minutes to complete the written survey that you may receive in the mail after you visit with us. Thank you!        [employer] Information     [employer] gives you secure access to your electronic health record. If you see a primary care provider, you can also send messages to your care team and make appointments. If you have questions, please call your primary care clinic.  If you do not have a primary care provider, please call [phone] and they will assist you.        Care EveryWhere ID     This is your Care EveryWhere ID. This could be used by other organizations to access your [facility] medical records  [id number]        After Visit Summary       This is your record. Keep this with you and show to your community pharmacist(s) and doctor(s) at your next visit.

## 2017-03-18 NOTE — ED PROVIDER NOTES
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 Frequent ED visits for pain.  Was seen approximately 15 hours ago in this ED for abdominal pain.  He is here today for bilateral foot pain. He notes he received opiates in the past and was recently started on gabapentin. He is requesting a dose of Dilaudid for his foot pain.  No new trauma, no falls, no erythema, no swelling.  No other complaints    I have reviewed the Medications, Allergies, Past Medical and Surgical History, and Social History in the Epic system.    Review of Systems  12 point review of symptoms was performed and is negative except as noted above.     Physical Exam   BP: (!) 131/97  Heart Rate: 90  Temp: 97.9  F (36.6  C)  SpO2: 93 %  Physical Exam  HEENT: The head is normocephalic and atraumatic. Pupils are equal round and reactive to light. Extraocular motions are intact. There is no scleral icterus. There is no facial swelling. The neck nontender and supple . There are no masses. OP is clear and free of lesions, erythema or exudate.   BACK: No CVA tenderness to palpation.   EXT: Full range of motion.  No edema. 2+ DP pulses bilaterally. No foot swelling or evidence of trauma or erythema. EHL, FHL, TA 5 out of 5. Sensory intact to light touch.  NEURO: Cranial nerves II through XII are intact and symmetric. Bilateral upper and lower extremities grossly show full range of motion without any focal deficits.  SKIN: No rashes, ecchymosis, or lacerations  PSYCH: Calm and cooperative, interactive.     ED Course     ED Course     Procedures                 Labs Ordered and Resulted from Time of ED Arrival Up to the Time of Departure from the ED - No data to display    Assessments & Plan (with Medical Decision Making)   Bilateral foot pain  Most likely  related to peripheral neuropathy, chronic pain, possibly amyloidosis.  No evidence of vascular compromise, cellulitis, trauma.  Discussed the inappropriateness of opiates for treatment of peripheral neuropathy.  Discussed that we could begin to titrate up on his gabapentin, but he must see his primary care doctor. He reports he has an appointment on Wednesday. Patient is agreeable to this plan.    - Patient ready and eager for discharge. Care plan, follow up plan, and reasons to return immediately to the ED were dicussed with the patient and summarized as noted in the discharge instructions.        I have reviewed the nursing notes.    I have reviewed the findings, diagnosis, plan and need for follow up with the patient.    New Prescriptions    No medications on file       Final diagnoses:   Peripheral polyneuropathy (H)       3/18/2017   [facility], [place], EMERGENCY DEPARTMENT     Robbin Moore MD  03/18/17 9704

## 2017-03-18 NOTE — ED NOTES
Bed: ED19  Expected date: 3/18/17  Expected time: 2:12 PM  Means of arrival: Ambulance  Comments:  N 713    61 male  Foot pain

## 2017-03-18 NOTE — ED NOTES
"Pt is refusing the GI cocktail, requesting something stronger for pain like Dilaudid. Saying \"This is the only thing that works for my pain, Toradol and GI cocktail don't work\". Pt is requesting to talk with with the MD.  "

## 2017-03-18 NOTE — ED AVS SNAPSHOT
[facility], Emergency Department    [address]    [place] [address]    Phone:  [phone]    Fax:  [phone]                                       Parmjit Hernández   MRN: [id number]    Department:  [facility], Emergency Department   Date of Visit:  3/18/2017           Patient Information     Date Of Birth          1955        Your diagnoses for this visit were:     RUQ abdominal pain        You were seen by Parmjit Alamo MD.        Discharge Instructions       Your labs were normal and previous imaging done at the time of other ER visits has been normal.  There is no clear cause for your symptoms, talk to your primary care provider.  You may use Tylenol or ibuprofen for your pain    Future Appointments        Provider Department Dept Phone Center    3/22/2017 10:00 AM Spike Tobin MD [facility] [phone] UP      24 Hour Appointment Hotline       To make an appointment at any [facility], call [phone] ([phone]). If you don't have a family doctor or clinic, we will help you find one. [facility] clinics are conveniently located to serve the needs of you and your family.             Review of your medicines      Our records show that you are taking the medicines listed below. If these are incorrect, please call your family doctor or clinic.        Dose / Directions Last dose taken    atorvastatin 10 MG tablet   Commonly known as:  LIPITOR   Quantity:  90 tablet        TAKE ONE TABLET BY MOUTH ONE TIME DAILY   Refills:  2        gabapentin 300 MG capsule   Commonly known as:  NEURONTIN   Dose:  300 mg   Quantity:  45 capsule        Take 1 capsule (300 mg) by mouth 3 times daily for 15 days   Refills:  0        lisinopril 20 MG tablet   Commonly known as:  PRINIVIL/ZESTRIL   Quantity:  90 tablet        TAKE ONE TABLET BY MOUTH ONE TIME DAILY   Refills:  3        metFORMIN 500 MG 24 hr tablet   Commonly known as:  GLUCOPHAGE-XR   Quantity:  360  tablet        TAKE FOUR TABLETS BY MOUTH DAILY   Refills:  PRN        order for DME   Quantity:  1 Device        Equipment being ordered: CPAP   Refills:  0        * oxyCODONE 5 MG IR tablet   Commonly known as:  ROXICODONE   Quantity:  70 tablet        1-2 q 6 hrs prn pain. Maximum of 10 a day   Refills:  0        * oxyCODONE 5 MG IR tablet   Commonly known as:  ROXICODONE   Quantity:  70 tablet        1 q 4 hr prn pain up to 10 a day   Refills:  0        * oxyCODONE 5 MG IR tablet   Commonly known as:  ROXICODONE   Dose:  10 mg   Quantity:  20 tablet        Take 2 tablets (10 mg) by mouth every 6 hours as needed for pain   Refills:  0        UNKNOWN MED DOSAGE   Indication:  chemotherapeutic medication weekly - 12 pills        Refills:  0        * Notice:  This list has 3 medication(s) that are the same as other medications prescribed for you. Read the directions carefully, and ask your doctor or other care provider to review them with you.            Procedures and tests performed during your visit     CBC with platelets differential    Comprehensive metabolic panel    Lipase      Orders Needing Specimen Collection     None      Pending Results     No orders found from 3/16/2017 to 3/19/2017.            Pending Culture Results     No orders found from 3/16/2017 to 3/19/2017.            Thank you for choosing [facility]       Thank you for choosing [facility] for your care. Our goal is always to provide you with excellent care. Hearing back from our patients is one way we can continue to improve our services. Please take a few minutes to complete the written survey that you may receive in the mail after you visit with us. Thank you!        [employer] Information     [employer] gives you secure access to your electronic health record. If you see a primary care provider, you can also send messages to your care team and make appointments. If you have questions, please call your primary care clinic.  If you do not have a  primary care provider, please call [phone] and they will assist you.        Care EveryWhere ID     This is your Care EveryWhere ID. This could be used by other organizations to access your [facility] medical records  [id number]        After Visit Summary       This is your record. Keep this with you and show to your community pharmacist(s) and doctor(s) at your next visit.

## 2017-03-20 ENCOUNTER — CARE COORDINATION (OUTPATIENT)
Dept: CARE COORDINATION | Facility: CLINIC | Age: 62
End: 2017-03-20

## 2017-03-20 NOTE — PROGRESS NOTES
Of course I will work on him to be more responsible. I do not believe he can handle narcotics. The restriction is a good idea. He is not doing well and it doesn't help that his care is fragmented between here and [place]

## 2017-03-20 NOTE — PROGRESS NOTES
Dr Tobin  I have reviewed pt EMR and the following is noted    Pt has been to ED 18 times over the past year with 2 admissions for the same complaint of either abd pain or peripheral neuropathy  These were at [facility]  Pt is now restricted to [facility] ED  I spoke with the RN CC at [place] ED and they will be setting up a Care Plan for the pt as he has become a frequent visitor for same complaints that he is seeking care for and pain medications for his pain    Pt was referred to [facility] by [place] [facility] on 2/14/17 for pain management and he cancelled the appointment, pt states that he had to as he is restricted to you  [facility] has noted in their last notes in Jan 2017 that it would be good to taper pt from narcotics    Pt is restricted to you for opioids and he obtained a narcotic order on 2/14/17 from [place] [facility]  Pt missed his f/u appointment with [place] [facility] in February and he is supposed to f/u with their Palliative care team as well    Pt will be coming in to see you on 3/22/17   Please advise if you are able to advise pt on setting up regular appointments with you and seeking care at ED only for emergent problems and not chronic and to follow up with [place] [facility] as well as appointment in April at [facility]  I appreciate you guidance with this for this pt    Thank you, [facility] RN CC    Juany Zamora RN Clinic Care Coordinator  [facility] [phone]

## 2017-03-22 ENCOUNTER — OFFICE VISIT (OUTPATIENT)
Dept: FAMILY MEDICINE | Facility: CLINIC | Age: 62
End: 2017-03-22
Payer: COMMERCIAL

## 2017-03-22 VITALS
BODY MASS INDEX: 30.29 KG/M2 | DIASTOLIC BLOOD PRESSURE: 60 MMHG | HEART RATE: 114 BPM | OXYGEN SATURATION: 100 % | RESPIRATION RATE: 16 BRPM | TEMPERATURE: 96 F | HEIGHT: 67 IN | WEIGHT: 193 LBS | SYSTOLIC BLOOD PRESSURE: 100 MMHG

## 2017-03-22 DIAGNOSIS — F31.9 BIPOLAR I DISORDER (H): ICD-10-CM

## 2017-03-22 DIAGNOSIS — R10.9 CHRONIC ABDOMINAL PAIN: Primary | ICD-10-CM

## 2017-03-22 DIAGNOSIS — E85.89 OTHER AMYLOIDOSIS (H): ICD-10-CM

## 2017-03-22 DIAGNOSIS — G89.29 CHRONIC ABDOMINAL PAIN: Primary | ICD-10-CM

## 2017-03-22 PROCEDURE — 99214 OFFICE O/P EST MOD 30 MIN: CPT | Performed by: FAMILY MEDICINE

## 2017-03-22 RX ORDER — GABAPENTIN 800 MG/1
800 TABLET ORAL 3 TIMES DAILY
Qty: 90 TABLET | Refills: 11 | Status: SHIPPED | OUTPATIENT
Start: 2017-03-22 | End: 2017-05-03

## 2017-03-22 NOTE — PROGRESS NOTES
Subjective:see recent notes. It is difficult to understand the entire story but basically he has had abdominal pain chronically for well over a year, was found to have the amyloidosis with the possibility that that could be the cause of his stomach pain, and he had treatment at [facility] with a bone marrow transplant, something that he was told would likely hold off this disease but not cure it, and they told him that his abdominal pain may or may not have anything to do with the amyloidosis but since treatment has not changed the chronic abdominal pain. Suction was that it is unrelated. He was followed by [place] [facility] here for some time and they were providing him with oxycodone but in February they said they didn't need to see him anymore and that he needed to transfer his care to a pain clinic and they gave him narcotics to last for a few weeks. Nothing has happened on getting a pain clinic set up and now he tells me that he needs to be referred by me to [facility]  Pain clinic. He is on a restricted care plan where he comes to me for primary care, can go to [facility] emergency room, can go to [place], but needs to be referred for everything else. He says he is not using any illicit drugs and he hasn't had any narcotics other than the small amount that he has got in the emergency room and the emergency room has told him that they will not provide him with narcotics anymore. He says he is in severe pain, can't function, begs me for more narcotics. He was given gabapentin at [facility] recently, 400 mg 3 times a day, can't see any benefit at this point. I am uncertain why he is only on one antidepressant at this point and no mood stabilizing medications but he says his psychiatrist took him off all of those things. I know that his psychiatrist is concerned about him, got a call from him I think last week. Patient says his family has been involved especially his 24-year-old son. He is unable to work and  apparently by next month will probably be able to get Social Security disability. It's a different life and he doesn't like it.      Objective: Normal thought processes and range of affect. Tearful at times.    Assessment and plan: I don't doubt that he has chronic pain but I have seen over the last year what happens when he has free access to narcotics and he falls apart, misuses them, extremely sedatedat times, at times not making any sense. I told him very clearly multiple times that I do not think he can handle narcotics and this is a chronic pain situation that will not get better with narcotics and in fact will get worse and I believe that's part of what has happened. I'm happy to refer him to the pain clinic. I told him I think it's worthwhile doubling his dose of gabapentin to 800 mg 3 times a day, could see some improvement fairly quickly. It c good mood stabilizing effect as well. I asked him to follow-up with his psychiatrist as soon as possible. I told him I'm happy to meet with him any time. I did not offer this to him but if his son would be willing to get involved and dole out1 or 2 pills a day that might be an option but I would need to get his sons buy in on that.I told him if he is unsafe to go to the emergency room but I don't think they will give him narcotics anymore. I told him I am happy to meet with him anytime. I have known this patient for 33 years.    Over 25 min was spent with pt, and over half was in counseling

## 2017-03-22 NOTE — MR AVS SNAPSHOT
After Visit Summary   3/22/2017    Parmjit Hernández    MRN: [id number]           Patient Information     Date Of Birth          1955        Visit Information        Provider Department      3/22/2017 10:00 AM Spike Tobin MD [facility]        Today's Diagnoses     Chronic abdominal pain    -  1    Bipolar I disorder (H)        Other amyloidosis (H)           Follow-ups after your visit        Additional Services     PAIN MANAGEMENT EXTERNAL REFERRAL       Your provider has referred you to: [facility]: [facility]) [facility] [phone]   [email or web address]    Please be aware that coverage of these services is subject to the terms and limitations of your health insurance plan.  Call member services at your health plan with any benefit or coverage questions.      Please bring the following with you to your appointment:    (1) Any X-Rays, CTs or MRIs which have been performed.  Contact the facility where they were done to arrange for  prior to your scheduled appointment.  Any new CT, MRI or other procedures ordered by your specialist must be performed at a [place] facility or coordinated by your clinic's referral office.    (2) List of current medications   (3) This referral request   (4) Any documents/labs given to you for this referral                  Who to contact     If you have questions or need follow up information about today's clinic visit or your schedule please contact [facility] directly at [phone].  Normal or non-critical lab and imaging results will be communicated to you by MyChart, letter or phone within 4 business days after the clinic has received the results. If you do not hear from us within 7 days, please contact the clinic through MyChart or phone. If you have a critical or abnormal lab result, we will notify you by phone as soon as  "possible.  Submit refill requests through [employer] or call your pharmacy and they will forward the refill request to us. Please allow 3 business days for your refill to be completed.          Additional Information About Your Visit        [email or web address] Information     [employer] gives you secure access to your electronic health record. If you see a primary care provider, you can also send messages to your care team and make appointments. If you have questions, please call your primary care clinic.  If you do not have a primary care provider, please call [phone] and they will assist you.        Care EveryWhere ID     This is your Care EveryWhere ID. This could be used by other organizations to access your [place] medical records  [id number]        Your Vitals Were     Pulse Temperature Respirations Height Pulse Oximetry BMI (Body Mass Index)    114 96  F (35.6  C) (Oral) 16 5' 7\" (1.702 m) 100% 30.23 kg/m2       Blood Pressure from Last 3 Encounters:   03/22/17 100/60   03/18/17 (!) 146/114   03/18/17 (!) 170/117    Weight from Last 3 Encounters:   03/22/17 193 lb (87.5 kg)   03/18/17 195 lb (88.5 kg)   03/12/17 195 lb (88.5 kg)              We Performed the Following     PAIN MANAGEMENT EXTERNAL REFERRAL          Today's Medication Changes          These changes are accurate as of: 3/22/17 11:20 AM.  If you have any questions, ask your nurse or doctor.               These medicines have changed or have updated prescriptions.        Dose/Directions    * gabapentin 400 MG capsule   Commonly known as:  NEURONTIN   This may have changed:  Another medication with the same name was added. Make sure you understand how and when to take each.        Dose:  400 mg   Take 1 capsule (400 mg) by mouth 3 times daily   Quantity:  60 capsule   Refills:  0       * gabapentin 800 MG tablet   Commonly known as:  NEURONTIN   This may have changed:  You were already taking a medication with the same name, and this prescription was added. " Make sure you understand how and when to take each.   Used for:  Chronic abdominal pain   Changed by:  Spike Tobni MD        Dose:  800 mg   Take 1 tablet (800 mg) by mouth 3 times daily   Quantity:  90 tablet   Refills:  11       * Notice:  This list has 2 medication(s) that are the same as other medications prescribed for you. Read the directions carefully, and ask your doctor or other care provider to review them with you.         Where to get your medicines      These medications were sent to [id number] - [place] [address] & [address] [address]     Phone:  [phone]     gabapentin 800 MG tablet                Primary Care Provider Office Phone # Fax #    Spike Tobin [phone]       [facility] [address] [facility]  [address]  [facility] [address]        Thank you!     Thank you for choosing [facility]  for your care. Our goal is always to provide you with excellent care. Hearing back from our patients is one way we can continue to improve our services. Please take a few minutes to complete the written survey that you may receive in the mail after your visit with us. Thank you!             Your Updated Medication List - Protect others around you: Learn how to safely use, store and throw away your medicines at www.disposemymeds.org.          This list is accurate as of: 3/22/17 11:20 AM.  Always use your most recent med list.                   Brand Name Dispense Instructions for use    atorvastatin 10 MG tablet    LIPITOR    90 tablet    TAKE ONE TABLET BY MOUTH ONE TIME DAILY       * gabapentin 400 MG capsule    NEURONTIN    60 capsule    Take 1 capsule (400 mg) by mouth 3 times daily       * gabapentin 800 MG tablet    NEURONTIN    90 tablet    Take 1 tablet (800 mg) by mouth 3 times daily       lisinopril 20 MG tablet    PRINIVIL/ZESTRIL    90 tablet    TAKE ONE TABLET BY  MOUTH ONE TIME DAILY       metFORMIN 500 MG 24 hr tablet    GLUCOPHAGE-XR    360 tablet    TAKE FOUR TABLETS BY MOUTH DAILY       order for DME     1 Device    Equipment being ordered: CPAP       * oxyCODONE 5 MG IR tablet    ROXICODONE    70 tablet    1-2 q 6 hrs prn pain. Maximum of 10 a day       * oxyCODONE 5 MG IR tablet    ROXICODONE    20 tablet    Take 2 tablets (10 mg) by mouth every 6 hours as needed for pain       TRINTELLIX 10 MG tablet   Generic drug:  vortioxetine      Take 1 tablet (10 mg) by mouth daily       UNKNOWN MED DOSAGE          * Notice:  This list has 4 medication(s) that are the same as other medications prescribed for you. Read the directions carefully, and ask your doctor or other care provider to review them with you.

## 2017-03-22 NOTE — NURSING NOTE
"Chief Complaint   Patient presents with     Recheck Medication     pain and oxycodone refill     initial /60 (BP Location: Left arm, Cuff Size: Adult Regular)  Pulse 114  Temp 96  F (35.6  C) (Oral)  Resp 16  Ht 5' 7\" (1.702 m)  Wt 193 lb (87.5 kg)  SpO2 100%  BMI 30.23 kg/m2 Estimated body mass index is 30.23 kg/(m^2) as calculated from the following:    Height as of this encounter: 5' 7\" (1.702 m).    Weight as of this encounter: 193 lb (87.5 kg).  BP completed using cuff size: regular.  L   arm      Health Maintenance that is potentially due pending provider review:  Pt is going through chemo right now    n/a    [employer] ma  "

## 2017-03-24 ENCOUNTER — TELEPHONE (OUTPATIENT)
Dept: FAMILY MEDICINE | Facility: CLINIC | Age: 62
End: 2017-03-24

## 2017-03-24 NOTE — TELEPHONE ENCOUNTER
Reason for Call: Request for an order or referral:    Order or referral being requested: Referral for Pain management     Date needed: as soon as possible    Has the patient been seen by the PCP for this problem? YES    Additional comments: Referral needs to be sent to Deysi at [facility]  Referral needs to be a written referral   Fax: [phone]    Phone number Patient can be reached at:  Home number on file [phone] (home)    Best Time:  anytime    Can we leave a detailed message on this number?  YES    Call taken on 3/24/2017 at 12:16 PM by Nydia Burkett

## 2017-03-29 ENCOUNTER — HOSPITAL ENCOUNTER (EMERGENCY)
Facility: CLINIC | Age: 62
Discharge: HOME | End: 2017-03-29
Attending: EMERGENCY MEDICINE | Admitting: EMERGENCY MEDICINE
Payer: COMMERCIAL

## 2017-03-29 VITALS
OXYGEN SATURATION: 94 % | BODY MASS INDEX: 30.61 KG/M2 | DIASTOLIC BLOOD PRESSURE: 83 MMHG | WEIGHT: 195 LBS | RESPIRATION RATE: 16 BRPM | SYSTOLIC BLOOD PRESSURE: 121 MMHG | HEIGHT: 67 IN | TEMPERATURE: 98.1 F

## 2017-03-29 DIAGNOSIS — M79.605 CHRONIC PAIN OF BOTH LOWER EXTREMITIES: ICD-10-CM

## 2017-03-29 DIAGNOSIS — G89.29 CHRONIC PAIN OF BOTH LOWER EXTREMITIES: ICD-10-CM

## 2017-03-29 DIAGNOSIS — M79.604 CHRONIC PAIN OF BOTH LOWER EXTREMITIES: ICD-10-CM

## 2017-03-29 PROCEDURE — 99282 EMERGENCY DEPT VISIT SF MDM: CPT | Performed by: EMERGENCY MEDICINE

## 2017-03-29 PROCEDURE — 99283 EMERGENCY DEPT VISIT LOW MDM: CPT | Mod: Z6 | Performed by: EMERGENCY MEDICINE

## 2017-03-29 ASSESSMENT — ENCOUNTER SYMPTOMS
EYE REDNESS: 0
ARTHRALGIAS: 0
CONFUSION: 0
ABDOMINAL PAIN: 0
DIFFICULTY URINATING: 0
COLOR CHANGE: 0
FEVER: 0
NECK STIFFNESS: 0
SHORTNESS OF BREATH: 0
HEADACHES: 0

## 2017-03-29 NOTE — ED NOTES
Bed: ED03  Expected date: 3/29/17  Expected time:   Means of arrival: Ambulance  Comments:  [address] 61yr M Chronic leg pain

## 2017-03-29 NOTE — ED AVS SNAPSHOT
[facility], [place], Emergency Department    [address]    [facility] [address]    Phone:  [phone]    Fax:  [phone]                                       Parmjit Hernández   MRN: [id number]    Department:  [facility], Emergency Department   Date of Visit:  3/29/2017           After Visit Summary Signature Page     I have received my discharge instructions, and my questions have been answered. I have discussed any challenges I see with this plan with the nurse or doctor.    ..........................................................................................................................................  Patient/Patient Representative Signature      ..........................................................................................................................................  Patient Representative Print Name and Relationship to Patient    ..................................................               ................................................  Date                                            Time    ..........................................................................................................................................  Reviewed by Signature/Title    ...................................................              ..............................................  Date                                                            Time

## 2017-03-29 NOTE — ED AVS SNAPSHOT
[facility], Emergency Department    [address]    [place] [address]    Phone:  [phone]    Fax:  [phone]                                       Parmjit Hernández   MRN: [id number]    Department:  [facility], Emergency Department   Date of Visit:  3/29/2017           Patient Information     Date Of Birth          1955        Your diagnoses for this visit were:     Chronic pain of both lower extremities        You were seen by Rad Lancaster MD.        Discharge Instructions       Continue gabapentin as directed.    Follow-up with your doctor and the pain clinic.    Return if any concerns.    24 Hour Appointment Hotline       To make an appointment at any [facility] clinic, call [phone] ([phone]). If you don't have a family doctor or clinic, we will help you find one. [facility] clinics are conveniently located to serve the needs of you and your family.             Review of your medicines      Our records show that you are taking the medicines listed below. If these are incorrect, please call your family doctor or clinic.        Dose / Directions Last dose taken    atorvastatin 10 MG tablet   Commonly known as:  LIPITOR   Quantity:  90 tablet        TAKE ONE TABLET BY MOUTH ONE TIME DAILY   Refills:  2        * gabapentin 400 MG capsule   Commonly known as:  NEURONTIN   Dose:  400 mg   Quantity:  60 capsule        Take 1 capsule (400 mg) by mouth 3 times daily   Refills:  0        * gabapentin 800 MG tablet   Commonly known as:  NEURONTIN   Dose:  800 mg   Quantity:  90 tablet        Take 1 tablet (800 mg) by mouth 3 times daily   Refills:  11        lisinopril 20 MG tablet   Commonly known as:  PRINIVIL/ZESTRIL   Quantity:  90 tablet        TAKE ONE TABLET BY MOUTH ONE TIME DAILY   Refills:  3        metFORMIN 500 MG 24 hr tablet   Commonly known as:  GLUCOPHAGE-XR   Quantity:  360 tablet        TAKE FOUR TABLETS BY MOUTH DAILY   Refills:  PRN        order for DME   Quantity:  1  Device        Equipment being ordered: CPAP   Refills:  0        * oxyCODONE 5 MG IR tablet   Commonly known as:  ROXICODONE   Quantity:  70 tablet        1-2 q 6 hrs prn pain. Maximum of 10 a day   Refills:  0        * oxyCODONE 5 MG IR tablet   Commonly known as:  ROXICODONE   Dose:  10 mg   Quantity:  20 tablet        Take 2 tablets (10 mg) by mouth every 6 hours as needed for pain   Refills:  0        TRINTELLIX 10 MG tablet   Dose:  10 mg   Generic drug:  vortioxetine        Take 1 tablet (10 mg) by mouth daily   Refills:  0        UNKNOWN MED DOSAGE   Indication:  chemotherapeutic medication weekly - 12 pills        Refills:  0        * Notice:  This list has 4 medication(s) that are the same as other medications prescribed for you. Read the directions carefully, and ask your doctor or other care provider to review them with you.            Orders Needing Specimen Collection     None      Pending Results     No orders found from 3/27/2017 to 3/30/2017.            Pending Culture Results     No orders found from 3/27/2017 to 3/30/2017.            Thank you for choosing [facility]       Thank you for choosing [facility] for your care. Our goal is always to provide you with excellent care. Hearing back from our patients is one way we can continue to improve our services. Please take a few minutes to complete the written survey that you may receive in the mail after you visit with us. Thank you!        [facility]acos[facility] Information     [employer] gives you secure access to your electronic health record. If you see a primary care provider, you can also send messages to your care team and make appointments. If you have questions, please call your primary care clinic.  If you do not have a primary care provider, please call [phone] and they will assist you.        Care EveryWhere ID     This is your Care EveryWhere ID. This could be used by other organizations to access your [facility] medical records  [id number]        After  Visit Summary       This is your record. Keep this with you and show to your community pharmacist(s) and doctor(s) at your next visit.

## 2017-03-29 NOTE — ED PROVIDER NOTES
History     Chief Complaint   Patient presents with     Leg Pain     Chronic pain, bi-legs, Hx neuropathy. Patient was able to ambulate and get into rig. EMS did not administer pain medications due to history of medication seeking behavior.      FINA Hernández is a 61 year old male with a history of amyloidosis s/p recent SCT and chemotherapy, type II diabetes, peripheral neuropathy and chronic pain, with multiple ED visits for pain-related complaints, who was brought in by ambulance with complaints of bilateral leg pain.  The patient complains of an exacerbation of his chronic leg pain.  He states that he's been taking his gabapentin as directed without improvement.  He started an 800 mg dose last week prescribed by his primary care provider.  He has not contacted his primary care provider to discuss further management of his chronic leg pain.  He has been referred to the pain clinic.  Patient denies any recent fall or injuries.  He denies fever.  He denies any recent illness.  Patient complains of pain from the knees down bilaterally.  He states it is sharp and burning.  Patient states that he was hoping I would provide him some Dilaudid for his symptoms.  His primary care provider has documented narcotic abuse and misuse in the past and will do longer provide narcotics for him.  Patient was in the emergency department on 3/11/17 for the same problem.  At that time, he was also informed that the emergency department would not be providing narcotics for his chronic pain.  The patient denies any depression or suicidal ideation.    I have reviewed the Medications, Allergies, Past Medical and Surgical History, and Social History in the Epic system.    Review of Systems   Constitutional: Negative for fever.   HENT: Negative for congestion.    Eyes: Negative for redness.   Respiratory: Negative for shortness of breath.    Cardiovascular: Negative for chest pain.   Gastrointestinal: Negative for abdominal pain.  "  Genitourinary: Negative for difficulty urinating.   Musculoskeletal: Negative for arthralgias and neck stiffness.   Skin: Negative for color change.   Neurological: Negative for headaches.        Bilateral leg pain   Psychiatric/Behavioral: Negative for confusion.   All other systems reviewed and are negative.      Physical Exam   BP: 141/87  Heart Rate: 90  Temp: 98.1  F (36.7  C)  Resp: 16  Height: 170.2 cm (5' 7\")  Weight: 88.5 kg (195 lb)  SpO2: 94 %  Physical Exam   Constitutional: He appears well-developed and well-nourished. No distress.   HENT:   Head: Normocephalic and atraumatic.   Eyes: Pupils are equal, round, and reactive to light. No scleral icterus.   Neck: Normal range of motion.   Cardiovascular: Normal rate, regular rhythm, normal heart sounds and intact distal pulses.    Pulses:       Dorsalis pedis pulses are 2+ on the right side, and 2+ on the left side.        Posterior tibial pulses are 2+ on the right side, and 2+ on the left side.   Pulmonary/Chest: Effort normal and breath sounds normal. No respiratory distress.   Abdominal: Soft. Bowel sounds are normal. There is no tenderness.   Musculoskeletal: Normal range of motion. He exhibits no edema or tenderness.   Neurological: He is alert. He has normal strength. No cranial nerve deficit. Coordination normal.   Skin: Skin is warm and dry. No rash noted. He is not diaphoretic.   Psychiatric: He expresses no suicidal ideation.   Nursing note and vitals reviewed.      ED Course     ED Course     Procedures            Critical Care time:    Again reviewed with the patient that the ED does not treat chronic pain.       Labs Ordered and Resulted from Time of ED Arrival Up to the Time of Departure from the ED - No data to display    Assessments & Plan (with Medical Decision Making)   61 year old male to the emergency department with chronic bilateral leg pain.  He does not have any evidence for acute process.  He has normal pulses.  He has no " evidence for cellulitis.  Patient has been in the emergency department before seeking narcotics for his chronic pain.  He is doing the same again today.  His request for Dilaudid was denied.  He was encouraged to continue gabapentin and follow-up with his primary care provider.  He denies depression and suicide ideation.    I have reviewed the nursing notes.    I have reviewed the findings, diagnosis, plan and need for follow up with the patient.    New Prescriptions    No medications on file       Final diagnoses:   Chronic pain of both lower extremities       3/29/2017   [facility], [place], EMERGENCY DEPARTMENT     Rad Lancaster MD  03/29/17 [id number]

## 2017-03-29 NOTE — ED NOTES
"Patient presents with increasing bi-lat LE and foot pain due to peripheral neuropathy that was diagnosed 6 weeks ago. Patient has not taken any of his prescribed medications for the last week because \"I ran out\" patient states. Per EMS, patient has a history of narcotic seeking behavior, therefore they did not give any pain medication in route (new regulations for [facility]).   "

## 2017-03-29 NOTE — DISCHARGE INSTRUCTIONS
Continue gabapentin as directed.    Follow-up with your doctor and the pain clinic.    Return if any concerns.

## 2017-04-03 ENCOUNTER — CARE COORDINATION (OUTPATIENT)
Dept: CARE COORDINATION | Facility: CLINIC | Age: 62
End: 2017-04-03

## 2017-04-03 NOTE — PROGRESS NOTES
Clinic Care Coordination Contact  [facility]/Voicemail    Referral Source: ED Follow-Up  Clinical Data: Care Coordinator Outreach    Left message on voicemail with call back information and requested return call.

## 2017-05-03 ENCOUNTER — OFFICE VISIT (OUTPATIENT)
Dept: FAMILY MEDICINE | Facility: CLINIC | Age: 62
End: 2017-05-03
Payer: COMMERCIAL

## 2017-05-03 VITALS
TEMPERATURE: 97.4 F | BODY MASS INDEX: 30.76 KG/M2 | HEART RATE: 111 BPM | HEIGHT: 67 IN | DIASTOLIC BLOOD PRESSURE: 60 MMHG | SYSTOLIC BLOOD PRESSURE: 100 MMHG | WEIGHT: 196 LBS | OXYGEN SATURATION: 99 % | RESPIRATION RATE: 16 BRPM

## 2017-05-03 DIAGNOSIS — I10 HYPERTENSION GOAL BP (BLOOD PRESSURE) < 140/90: ICD-10-CM

## 2017-05-03 DIAGNOSIS — R53.83 OTHER FATIGUE: Primary | ICD-10-CM

## 2017-05-03 DIAGNOSIS — G89.29 CHRONIC ABDOMINAL PAIN: ICD-10-CM

## 2017-05-03 DIAGNOSIS — R10.9 CHRONIC ABDOMINAL PAIN: ICD-10-CM

## 2017-05-03 LAB
ERYTHROCYTE [DISTWIDTH] IN BLOOD BY AUTOMATED COUNT: 17.5 % (ref 10–15)
HBA1C MFR BLD: 4.8 % (ref 4.3–6)
HCT VFR BLD AUTO: 36 % (ref 40–53)
HGB BLD-MCNC: 12.4 G/DL (ref 13.3–17.7)
MCH RBC QN AUTO: 34.3 PG (ref 26.5–33)
MCHC RBC AUTO-ENTMCNC: 34.4 G/DL (ref 31.5–36.5)
MCV RBC AUTO: 99 FL (ref 78–100)
PLATELET # BLD AUTO: 90 10E9/L (ref 150–450)
RBC # BLD AUTO: 3.62 10E12/L (ref 4.4–5.9)
WBC # BLD AUTO: 6.2 10E9/L (ref 4–11)

## 2017-05-03 PROCEDURE — 99214 OFFICE O/P EST MOD 30 MIN: CPT | Performed by: FAMILY MEDICINE

## 2017-05-03 PROCEDURE — 85027 COMPLETE CBC AUTOMATED: CPT | Performed by: FAMILY MEDICINE

## 2017-05-03 PROCEDURE — 36415 COLL VENOUS BLD VENIPUNCTURE: CPT | Performed by: FAMILY MEDICINE

## 2017-05-03 PROCEDURE — 84443 ASSAY THYROID STIM HORMONE: CPT | Performed by: FAMILY MEDICINE

## 2017-05-03 PROCEDURE — 80053 COMPREHEN METABOLIC PANEL: CPT | Performed by: FAMILY MEDICINE

## 2017-05-03 PROCEDURE — 83036 HEMOGLOBIN GLYCOSYLATED A1C: CPT | Performed by: FAMILY MEDICINE

## 2017-05-03 RX ORDER — OLANZAPINE 7.5 MG/1
7.5 TABLET, FILM COATED ORAL AT BEDTIME
COMMUNITY
Start: 2017-05-03 | End: 2017-06-02

## 2017-05-03 RX ORDER — GABAPENTIN 800 MG/1
800 TABLET ORAL 3 TIMES DAILY
Qty: 90 TABLET | Refills: 11 | Status: SHIPPED | OUTPATIENT
Start: 2017-05-03 | End: 2017-06-02

## 2017-05-03 RX ORDER — DIVALPROEX SODIUM 500 MG/1
1000 TABLET, DELAYED RELEASE ORAL DAILY
Qty: 60 TABLET | COMMUNITY
Start: 2017-05-03 | End: 2017-06-02

## 2017-05-03 RX ORDER — DOXEPIN HYDROCHLORIDE 25 MG/1
50 CAPSULE ORAL AT BEDTIME
COMMUNITY
Start: 2017-05-03 | End: 2017-06-02

## 2017-05-03 RX ORDER — LISINOPRIL 20 MG/1
20 TABLET ORAL DAILY
Qty: 90 TABLET | Refills: 3 | Status: CANCELLED | OUTPATIENT
Start: 2017-05-03

## 2017-05-03 NOTE — PROGRESS NOTES
Wow! You can control your diabetes just by what you eat. I know the eating problems are involuntary at this point but... Wow!

## 2017-05-03 NOTE — PROGRESS NOTES
Subjective: Patient is now in a treatment program that could last almost a year, a live in place, and he is here to talk about his extreme lethargy. He just has no energy, feels drained with everything. He will be following up with [facility] as they did a bone marrow transplant and he is not sure what his follow-up is supposed to be. He of course is now off of narcotics and marijuana. He continues to have chronic abdominal pain and chronic neuropathy. He said he had lost a lot of weight although right now his weight is the same as it was the last time we checked. He can't eat a lot. His diabetes control is been perfect and he went off metformin and he ran out of his lisinopril but now it doesn't look like he needs it because his blood pressure is so low. He just wonders why he is so fatigued. He has pieced together smaller doses of Neurontin but he really should be taking 800 mg 3 times a day. He needs a new prescription and forms filled out for the place where he lives.    Objective: He looks tired but not toxic in any way. ENT is normal. Neck and thyroid are normal. Lungs are clear. Heart is regular without murmurs. Abdomen slightly tender in the right upper quadrant. His gallbladder is out. No edema.    Assessment and plan: Extreme fatigue and I think this is multifactorial. We'll check basic lab tests but if they look okay I think he should continue the program and he will gradually climb out of this.    Over 25 min was spent with pt, and over half was in counseling

## 2017-05-03 NOTE — PROGRESS NOTES
While the counts are slightly low, these findings will have no effect on how you feel. Are they a clue about what is going on? I don't know

## 2017-05-03 NOTE — MR AVS SNAPSHOT
"              After Visit Summary   5/3/2017    Parmjit Hernández    MRN: [id number]           Patient Information     Date Of Birth          1955        Visit Information        Provider Department      5/3/2017 1:00 PM Spiek Tobin MD [facility]        Today's Diagnoses     Other fatigue    -  1    Hypertension goal BP (blood pressure) < 140/90        Chronic abdominal pain           Follow-ups after your visit        Who to contact     If you have questions or need follow up information about today's clinic visit or your schedule please contact [facility] directly at [phone].  Normal or non-critical lab and imaging results will be communicated to you by MyChart, letter or phone within 4 business days after the clinic has received the results. If you do not hear from us within 7 days, please contact the clinic through Manatront or phone. If you have a critical or abnormal lab result, we will notify you by phone as soon as possible.  Submit refill requests through GigaTrust or call your pharmacy and they will forward the refill request to us. Please allow 3 business days for your refill to be completed.          Additional Information About Your Visit        MyChart Information     G[employer] gives you secure access to your electronic health record. If you see a primary care provider, you can also send messages to your care team and make appointments. If you have questions, please call your primary care clinic.  If you do not have a primary care provider, please call [phone] and they will assist you.        Care EveryWhere ID     This is your Care EveryWhere ID. This could be used by other organizations to access your [facility] medical records  [id number]        Your Vitals Were     Pulse Temperature Respirations Height Pulse Oximetry BMI (Body Mass Index)    111 97.4  F (36.3  C) (Oral) 16 5' 7\" (1.702 m) 99% 30.7 kg/m2       Blood Pressure from Last 3 Encounters:   05/03/17 " 100/60   03/29/17 121/83   03/22/17 100/60    Weight from Last 3 Encounters:   05/03/17 196 lb (88.9 kg)   03/29/17 195 lb (88.5 kg)   03/22/17 193 lb (87.5 kg)              We Performed the Following     CBC with platelets     Comprehensive metabolic panel (BMP + Alb, Alk Phos, ALT, AST, Total. Bili, TP)     Hemoglobin A1c     TSH with free T4 reflex          Today's Medication Changes          These changes are accurate as of: 5/3/17  4:25 PM.  If you have any questions, ask your nurse or doctor.               These medicines have changed or have updated prescriptions.        Dose/Directions    gabapentin 800 MG tablet   Commonly known as:  NEURONTIN   This may have changed:  Another medication with the same name was removed. Continue taking this medication, and follow the directions you see here.   Used for:  Chronic abdominal pain   Changed by:  Spike Tobin MD        Dose:  800 mg   Take 1 tablet (800 mg) by mouth 3 times daily   Quantity:  90 tablet   Refills:  11            Where to get your medicines      These medications were [place] to [facility] [place] [place] [address]  [address], [place] [address]     Phone:  [phone]     gabapentin 800 MG tablet                Primary Care Provider Office Phone # Fax #    Spike Tobin [phone]       [facility] [address] [place] [address]        Thank you!     Thank you for choosing [facility]  for your care. Our goal is always to provide you with excellent care. Hearing back from our patients is one way we can continue to improve our services. Please take a few minutes to complete the written survey that you may receive in the mail after your visit with us. Thank you!             Your Updated Medication List - Protect others around you: Learn how to safely use, store and throw away your medicines at www.disposemymeds.org.          This list is  accurate as of: 5/3/17  4:25 PM.  Always use your most recent med list.                   Brand Name Dispense Instructions for use    atorvastatin 10 MG tablet    LIPITOR    90 tablet    TAKE ONE TABLET BY MOUTH ONE TIME DAILY       divalproex 500 MG EC tablet    DEPAKOTE    60 tablet    Take 1 tablet (500 mg) by mouth At Bedtime       doxepin 25 MG capsule    SINEquan     Take 1 capsule (25 mg) by mouth At Bedtime       gabapentin 800 MG tablet    NEURONTIN    90 tablet    Take 1 tablet (800 mg) by mouth 3 times daily       OLANZapine 7.5 MG tablet    zyPREXA     Take 1 tablet (7.5 mg) by mouth At Bedtime       order for DME     1 Device    Equipment being ordered: CPAP       UNKNOWN MED DOSAGE

## 2017-05-03 NOTE — NURSING NOTE
"Chief Complaint   Patient presents with     Abdominal Pain     NEUROPATHY     initial /60 (BP Location: Left arm, Cuff Size: Adult Regular)  Pulse 111  Temp 97.4  F (36.3  C) (Oral)  Resp 16  Ht 5' 7\" (1.702 m)  Wt 196 lb (88.9 kg)  SpO2 99%  BMI 30.7 kg/m2 Estimated body mass index is 30.7 kg/(m^2) as calculated from the following:    Height as of this encounter: 5' 7\" (1.702 m).    Weight as of this encounter: 196 lb (88.9 kg).  BP completed using cuff size: regular.  L  arm      Health Maintenance that is potentially due pending provider review:  NONE    n/a    Sung Car ma  "

## 2017-05-04 LAB
ALBUMIN SERPL-MCNC: 3.9 G/DL (ref 3.4–5)
ALP SERPL-CCNC: 131 U/L (ref 40–150)
ALT SERPL W P-5'-P-CCNC: 37 U/L (ref 0–70)
ANION GAP SERPL CALCULATED.3IONS-SCNC: 8 MMOL/L (ref 3–14)
AST SERPL W P-5'-P-CCNC: 15 U/L (ref 0–45)
BILIRUB SERPL-MCNC: 1.2 MG/DL (ref 0.2–1.3)
BUN SERPL-MCNC: 23 MG/DL (ref 7–30)
CALCIUM SERPL-MCNC: 8.3 MG/DL (ref 8.5–10.1)
CHLORIDE SERPL-SCNC: 107 MMOL/L (ref 94–109)
CO2 SERPL-SCNC: 25 MMOL/L (ref 20–32)
CREAT SERPL-MCNC: 1 MG/DL (ref 0.66–1.25)
GFR SERPL CREATININE-BSD FRML MDRD: 76 ML/MIN/1.7M2
GLUCOSE SERPL-MCNC: 141 MG/DL (ref 70–99)
POTASSIUM SERPL-SCNC: 4.4 MMOL/L (ref 3.4–5.3)
PROT SERPL-MCNC: 6.4 G/DL (ref 6.8–8.8)
SODIUM SERPL-SCNC: 140 MMOL/L (ref 133–144)
TSH SERPL DL<=0.005 MIU/L-ACNC: 3.43 MU/L (ref 0.4–4)

## 2017-05-04 NOTE — PROGRESS NOTES
Thyroid fine too-so as I feared, we don't find anything. My gut feeling is that this is going to take a long time and will gradually get better on its own

## 2017-05-25 ENCOUNTER — TELEPHONE (OUTPATIENT)
Dept: FAMILY MEDICINE | Facility: CLINIC | Age: 62
End: 2017-05-25

## 2017-05-25 ENCOUNTER — OFFICE VISIT (OUTPATIENT)
Dept: FAMILY MEDICINE | Facility: CLINIC | Age: 62
End: 2017-05-25
Payer: COMMERCIAL

## 2017-05-25 VITALS
SYSTOLIC BLOOD PRESSURE: 132 MMHG | TEMPERATURE: 97.3 F | HEART RATE: 90 BPM | BODY MASS INDEX: 36.49 KG/M2 | HEIGHT: 67 IN | OXYGEN SATURATION: 96 % | WEIGHT: 232.5 LBS | DIASTOLIC BLOOD PRESSURE: 86 MMHG

## 2017-05-25 DIAGNOSIS — F31.9 BIPOLAR I DISORDER (H): ICD-10-CM

## 2017-05-25 DIAGNOSIS — E78.5 HYPERLIPIDEMIA LDL GOAL <100: ICD-10-CM

## 2017-05-25 DIAGNOSIS — Z00.01 ENCOUNTER FOR ROUTINE ADULT HEALTH EXAMINATION WITH ABNORMAL FINDINGS: Primary | ICD-10-CM

## 2017-05-25 DIAGNOSIS — R60.1 GENERALIZED EDEMA: ICD-10-CM

## 2017-05-25 DIAGNOSIS — E11.9 TYPE 2 DIABETES MELLITUS WITHOUT COMPLICATION, WITHOUT LONG-TERM CURRENT USE OF INSULIN (H): ICD-10-CM

## 2017-05-25 LAB
ANION GAP SERPL CALCULATED.3IONS-SCNC: 9 MMOL/L (ref 3–14)
BUN SERPL-MCNC: 13 MG/DL (ref 7–30)
CALCIUM SERPL-MCNC: 8.4 MG/DL (ref 8.5–10.1)
CHLORIDE SERPL-SCNC: 112 MMOL/L (ref 94–109)
CO2 SERPL-SCNC: 21 MMOL/L (ref 20–32)
CREAT SERPL-MCNC: 0.72 MG/DL (ref 0.66–1.25)
GFR SERPL CREATININE-BSD FRML MDRD: ABNORMAL ML/MIN/1.7M2
GLUCOSE SERPL-MCNC: 202 MG/DL (ref 70–99)
LDLC SERPL DIRECT ASSAY-MCNC: 124 MG/DL
NT-PROBNP SERPL-MCNC: 589 PG/ML (ref 0–125)
POTASSIUM SERPL-SCNC: 4 MMOL/L (ref 3.4–5.3)
SODIUM SERPL-SCNC: 142 MMOL/L (ref 133–144)

## 2017-05-25 PROCEDURE — 83880 ASSAY OF NATRIURETIC PEPTIDE: CPT | Performed by: FAMILY MEDICINE

## 2017-05-25 PROCEDURE — 99212 OFFICE O/P EST SF 10 MIN: CPT | Mod: 25 | Performed by: FAMILY MEDICINE

## 2017-05-25 PROCEDURE — 80048 BASIC METABOLIC PNL TOTAL CA: CPT | Performed by: FAMILY MEDICINE

## 2017-05-25 PROCEDURE — 83721 ASSAY OF BLOOD LIPOPROTEIN: CPT | Performed by: FAMILY MEDICINE

## 2017-05-25 PROCEDURE — 99207 C FOOT EXAM  NO CHARGE: CPT | Mod: 25 | Performed by: FAMILY MEDICINE

## 2017-05-25 PROCEDURE — 36415 COLL VENOUS BLD VENIPUNCTURE: CPT | Performed by: FAMILY MEDICINE

## 2017-05-25 PROCEDURE — 99396 PREV VISIT EST AGE 40-64: CPT | Performed by: FAMILY MEDICINE

## 2017-05-25 RX ORDER — FUROSEMIDE 20 MG
TABLET ORAL
Qty: 60 TABLET | Refills: 0 | Status: SHIPPED | OUTPATIENT
Start: 2017-05-25 | End: 2017-06-02

## 2017-05-25 RX ORDER — POTASSIUM CHLORIDE 750 MG/1
TABLET, EXTENDED RELEASE ORAL
Qty: 60 TABLET | Refills: 0 | Status: SHIPPED | OUTPATIENT
Start: 2017-05-25 | End: 2017-06-02

## 2017-05-25 RX ORDER — ATORVASTATIN CALCIUM 10 MG/1
10 TABLET, FILM COATED ORAL DAILY
Qty: 90 TABLET | Refills: 3 | Status: SHIPPED | OUTPATIENT
Start: 2017-05-25 | End: 2017-06-02

## 2017-05-25 ASSESSMENT — ANXIETY QUESTIONNAIRES
5. BEING SO RESTLESS THAT IT IS HARD TO SIT STILL: NOT AT ALL
2. NOT BEING ABLE TO STOP OR CONTROL WORRYING: SEVERAL DAYS
GAD7 TOTAL SCORE: 5
3. WORRYING TOO MUCH ABOUT DIFFERENT THINGS: SEVERAL DAYS
7. FEELING AFRAID AS IF SOMETHING AWFUL MIGHT HAPPEN: NOT AT ALL
1. FEELING NERVOUS, ANXIOUS, OR ON EDGE: SEVERAL DAYS
6. BECOMING EASILY ANNOYED OR IRRITABLE: SEVERAL DAYS
IF YOU CHECKED OFF ANY PROBLEMS ON THIS QUESTIONNAIRE, HOW DIFFICULT HAVE THESE PROBLEMS MADE IT FOR YOU TO DO YOUR WORK, TAKE CARE OF THINGS AT HOME, OR GET ALONG WITH OTHER PEOPLE: SOMEWHAT DIFFICULT

## 2017-05-25 ASSESSMENT — PATIENT HEALTH QUESTIONNAIRE - PHQ9: 5. POOR APPETITE OR OVEREATING: SEVERAL DAYS

## 2017-05-25 NOTE — NURSING NOTE
"Chief Complaint   Patient presents with     Physical     not fasting     /86  Pulse 90  Temp 97.3  F (36.3  C) (Oral)  Ht 5' 7\" (1.702 m)  Wt 232 lb 8 oz (105.5 kg)  SpO2 96%  BMI 36.41 kg/m2 Estimated body mass index is 36.41 kg/(m^2) as calculated from the following:    Height as of this encounter: 5' 7\" (1.702 m).    Weight as of this encounter: 232 lb 8 oz (105.5 kg).  BP completed using cuff size: large       Health Maintenance due pending provider review:  PHQ9 and GAD7    PHQ/ACT/NANCY--Gave pt questionnare      Surya Hammer CMA  "

## 2017-05-25 NOTE — TELEPHONE ENCOUNTER
Reason for Call:  Medication or medication refill:    Do you use a [facility]?  Name of the pharmacy and phone number for the current request:     [id number] - [place], [place] - [address] [id number] - [place], [place] - [address] [facility] [address] [facility] - [facility] [place] - [address] [place] [address] [address]      Name of the medication requested: OTC    Other request: pt needs note allowing him to take OTC per MP. Please advise    Can we leave a detailed message on this number? YES    Phone number patient can be reached at: Home number on file [phone] (home)    Best Time: any    Call taken on 5/25/2017 at 11:58 AM by Jarrell Glasgow

## 2017-05-25 NOTE — MR AVS SNAPSHOT
After Visit Summary   5/25/2017    Parmjit Hernández    MRN: [id number]           Patient Information     Date Of Birth          1955        Visit Information        Provider Department      5/25/2017 8:45 AM Spike Tobin MD [facility]        Today's Diagnoses     Encounter for routine adult health examination with abnormal findings    -  1    Type 2 diabetes mellitus without complication, without long-term current use of insulin (H)        Hyperlipidemia LDL goal <100        Bipolar I disorder (H)        Generalized edema          Care Instructions      Preventive Health Recommendations  Male Ages 50 - 64    Yearly exam:             See your health care provider every year in order to  o   Review health changes.   o   Discuss preventive care.    o   Review your medicines if your doctor has prescribed any.     Have a cholesterol test every 5 years, or more frequently if you are at risk for high cholesterol/heart disease.     Have a diabetes test (fasting glucose) every three years. If you are at risk for diabetes, you should have this test more often.     Have a colonoscopy at age 50, or have a yearly FIT test (stool test). These exams will check for colon cancer.      Talk with your health care provider about whether or not a prostate cancer screening test (PSA) is right for you.    You should be tested each year for STDs (sexually transmitted diseases), if you re at risk.     Shots: Get a flu shot each year. Get a tetanus shot every 10 years.     Nutrition:    Eat at least 5 servings of fruits and vegetables daily.     Eat whole-grain bread, whole-wheat pasta and brown rice instead of white grains and rice.     Talk to your provider about Calcium and Vitamin D.     Lifestyle    Exercise for at least 150 minutes a week (30 minutes a day, 5 days a week). This will help you control your weight and prevent disease.     Limit alcohol to one drink per day.     No smoking.     Wear  "sunscreen to prevent skin cancer.     See your dentist every six months for an exam and cleaning.     See your eye doctor every 1 to 2 years.            Follow-ups after your visit        Future tests that were ordered for you today     Open Future Orders        Priority Expected Expires Ordered    Valproic acid Routine 6/25/2017 5/25/2018 5/25/2017            Who to contact     If you have questions or need follow up information about today's clinic visit or your schedule please contact [facility] directly at [phone].  Normal or non-critical lab and imaging results will be communicated to you by Stupeflixhart, letter or phone within 4 business days after the clinic has received the results. If you do not hear from us within 7 days, please contact the clinic through Sk[employer] Software or phone. If you have a critical or abnormal lab result, we will notify you by phone as soon as possible.  Submit refill requests through [employer] Software or call your pharmacy and they will forward the refill request to us. Please allow 3 business days for your refill to be completed.          Additional Information About Your Visit        Stupe[employer] Information     [employer] gives you secure access to your electronic health record. If you see a primary care provider, you can also send messages to your care team and make appointments. If you have questions, please call your primary care clinic.  If you do not have a primary care provider, please call [phone] and they will assist you.        Care EveryWhere ID     This is your Care EveryWhere ID. This could be used by other organizations to access your [facility] medical records  [id number]        Your Vitals Were     Pulse Temperature Height Pulse Oximetry BMI (Body Mass Index)       90 97.3  F (36.3  C) (Oral) 5' 7\" (1.702 m) 96% 36.41 kg/m2        Blood Pressure from Last 3 Encounters:   05/25/17 132/86   05/03/17 100/60   03/29/17 121/83    Weight from Last 3 Encounters:   05/25/17 232 lb 8 " oz (105.5 kg)   05/03/17 196 lb (88.9 kg)   03/29/17 195 lb (88.5 kg)              We Performed the Following     Basic metabolic panel     BNP-N terminal pro     FOOT EXAM     LDL cholesterol direct     OFFICE/OUTPT VISIT,ÁNGEL OMER II          Today's Medication Changes          These changes are accurate as of: 5/25/17  9:20 AM.  If you have any questions, ask your nurse or doctor.               Start taking these medicines.        Dose/Directions    furosemide 20 MG tablet   Commonly known as:  LASIX   Used for:  Generalized edema   Started by:  Spike Tobin MD        1-2 daily prn edema   Quantity:  60 tablet   Refills:  0       potassium chloride SA 10 MEQ CR tablet   Commonly known as:  K-DUR/KLOR-CON M   Used for:  Generalized edema   Started by:  Spike Tobin MD        Take 1 with every dose of lasix   Quantity:  60 tablet   Refills:  0         These medicines have changed or have updated prescriptions.        Dose/Directions    atorvastatin 10 MG tablet   Commonly known as:  LIPITOR   This may have changed:  See the new instructions.   Used for:  Hyperlipidemia LDL goal <100   Changed by:  Spike Tobin MD        Dose:  10 mg   Take 1 tablet (10 mg) by mouth daily   Quantity:  90 tablet   Refills:  3            Where to get your medicines      These medications [place] [facility] [place] [place] [address]  [address], [place] [address]     Phone:  [phone]     atorvastatin 10 MG tablet    furosemide 20 MG tablet    potassium chloride SA 10 MEQ CR tablet                Primary Care Provider Office Phone # Fax #    Spike Tobin -[phone]       [facility] [address] [address]        Thank you!     Thank you for choosing [facility]  for your care. Our goal is always to provide you with excellent care. Hearing back from our patients is one way we can continue to  improve our services. Please take a few minutes to complete the written survey that you may receive in the mail after your visit with us. Thank you!             Your Updated Medication List - Protect others around you: Learn how to safely use, store and throw away your medicines at www.disposemymeds.org.          This list is accurate as of: 5/25/17  9:20 AM.  Always use your most recent med list.                   Brand Name Dispense Instructions for use    atorvastatin 10 MG tablet    LIPITOR    90 tablet    Take 1 tablet (10 mg) by mouth daily       divalproex 500 MG EC tablet    DEPAKOTE    60 tablet    Take 1,000 mg by mouth daily       doxepin 25 MG capsule    SINEquan     Take 50 mg by mouth At Bedtime       furosemide 20 MG tablet    LASIX    60 tablet    1-2 daily prn edema       gabapentin 800 MG tablet    NEURONTIN    90 tablet    Take 1 tablet (800 mg) by mouth 3 times daily       OLANZapine 7.5 MG tablet    zyPREXA     Take 1 tablet (7.5 mg) by mouth At Bedtime       order for DME     1 Device    Equipment being ordered: CPAP       potassium chloride SA 10 MEQ CR tablet    K-DUR/KLOR-CON M    60 tablet    Take 1 with every dose of lasix       UNKNOWN MED DOSAGE

## 2017-05-25 NOTE — TELEPHONE ENCOUNTER
MP,  Pt requesting a letter to say that she is able to take 1000mg tylenol and 800mg ibuprofen at the same time.  This letter can be faxed to [phone] attention Jamar Herring.  Letter pended.  Please advise.  Thanks,  Norma Casillas RN

## 2017-05-25 NOTE — PROGRESS NOTES
These are ok. We may have to adjust the statin dose up but I have a feeling if you stop gaining weight and eat better it would come down on its own

## 2017-05-25 NOTE — PROGRESS NOTES
"It is certainly elevated but for your age it is not high enough to be \"diagnostic\" of heart overload unless it is above 900"

## 2017-05-25 NOTE — PROGRESS NOTES
SUBJECTIVE:     CC: Parmjit Hernández is an 61 year old male who presents for preventative health visit.     Healthy Habits:    Do you get at least three servings of calcium containing foods daily (dairy, green leafy vegetables, etc.)? NO    Amount of exercise or daily activities, outside of work: 0 day(s) per week    Problems taking medications regularly Yes  Lipitor haven't taken for 3 weeks ran out   -     Medication side effects: No    Have you had an eye exam in the past two years? yes    Do you see a dentist twice per year? yes    Do you have sleep apnea, excessive snoring or daytime drowsiness?yes       edema,  Neuropathy.  Lower back pain   Today's PHQ-2 Score:   PHQ-2 ( 1999 Pf[employer]) 5/25/2017 5/3/2017   Q1: Little interest or pleasure in doing things 0 0   Q2: Feeling down, depressed or hopeless 1 0   PHQ-2 Score 1 0   Q1: Little interest or pleasure in doing things - -   Q2: Feeling down, depressed or hopeless - -   PHQ-2 Score - -       Abuse: Current or Past(Physical, Sexual or Emotional)- No  Do you feel safe in your environment - Yes    Social History   Substance Use Topics     Smoking status: Never Smoker     Smokeless tobacco: Never Used     Alcohol use No      Comment: rarely     The patient does not drink >3 drinks per day nor >7 drinks per week.    Last PSA: No results found for: PSA    Recent Labs   Lab Test 06/23/16  09/01/15   1201  12/03/14   1015   CHOL  177  195  142   HDL  47  33*  25*   LDL  101  140*  71   TRIG  147  109  232*   CHOLHDLRATIO   --   5.9*  5.7*   NHDL  130   --    --        Reviewed orders with patient. Reviewed health maintenance and updated orders accordingly - Yes    Reviewed and updated as needed this visit by clinical staff  Tobacco  Allergies  Meds  Problems  Med Hx  Surg Hx  Fam Hx  Soc Hx          Reviewed and updated as needed this visit by Provider  Meds  Problems            ROS:his weight has shot up over 30 pounds in less than a month. Basically in the  last 2 weeks he has gotten incredibly swollen especially in his hands and his feet, no shortness of breath, and he did this one other time when he was in the process of treatment for chemotherapy and they gave him probably Lasix for a period of time and then it all went away. Part of this is that his chronic stomach pain went away. As a result he is eating much higher amounts of food and lots and lots of snacks especially popcorn and the food at the institution where he is living as he goes to a 13 month treatment program is food that presumably has tons of salt. He knows he needs to change this pattern but it is so nice to be able to eat again that he is just overdoing it. He doesn't have access to anything to check his blood sugars but his last A1c just less than a month ago was in the non-diabetes range.there is no history of heart problems although apparently a month ago he developed chest pain and went to the emergency room and they tested everything and said his heart was fine and that didn't recur. Will check a BNP today to make sure this isn't related to heart failure. He has access to exercise but hasn't really started doing it. He still has neuropathy although the main neuropathy has gone away and it's mostly now just in the hands and feet which of course are also the things that are ridiculously swollen right now. He agrees to wait before thinking about more medication for neuropathy until we see what happens when the edema goes away. He will start with furosemide one or 2 a day and potassium 1 or 2 a day and follow-up in 1 month. He also needs a Depakote level checked probably next week for his psychiatrist as they just increase the level to 1000 mg a day. His psychiatrist is Dr. Jose F Matthew. He isn't fasting today but we can check LDL direct. He will follow-up in one month  C: NEGATIVE for fever, chills, change in weight  I: NEGATIVE for worrisome rashes, moles or lesions  E: NEGATIVE for vision  changes or irritation  ENT: NEGATIVE for ear, mouth and throat problems  R: NEGATIVE for significant cough or SOB  CV: NEGATIVE for chest pain, palpitations or peripheral edema  GI: NEGATIVE for nausea, abdominal pain, heartburn, or change in bowel habits   male: negative for dysuria, hematuria, decreased urinary stream, erectile dysfunction, urethral discharge  M: NEGATIVE for significant arthralgias or myalgia  N: NEGATIVE for weakness, dizziness or paresthesias  P: NEGATIVE for changes in mood or affect    Problem list, Medication list, Allergies, and Medical/Social/Surgical histories reviewed in [facility] and updated as appropriate.  OBJECTIVE:     There were no vitals taken for this visit.  EXAM:  GENERAL: healthy, alert and no distress  EYES: Eyes grossly normal to inspection, PERRL and conjunctivae and sclerae normal  HENT: ear canals and TM's normal, nose and mouth without ulcers or lesions  NECK: no adenopathy, no asymmetry, masses, or scars and thyroid normal to palpation  RESP: lungs clear to auscultation - no rales, rhonchi or wheezes  CV: regular rate and rhythm, normal S1 S2, no S3 or S4, no murmur, click or rub, no peripheral edema and peripheral pulses strong  ABDOMEN: soft, nontender, no hepatosplenomegaly, no masses and bowel sounds normal   (male): normal male genitalia without lesions or urethral discharge, no hernia  RECTAL: normal sphincter tone, no rectal masses, prostate normal size, smooth, nontender without nodules or masses  MS: 3+ edema to knees  SKIN: no suspicious lesions or rashes  NEURO: Normal strength and tone, mentation intact and speech normal  PSYCH: mentation appears normal, affect normal/bright    ASSESSMENT/PLAN:     1. Encounter for routine adult health examination with abnormal findings      2. Type 2 diabetes mellitus without complication, without long-term current use of insulin (H)    - FOOT EXAM  - LDL cholesterol direct  - OFFICE/OUTPT VISIT,EST,LEVL II    3.  "Hyperlipidemia LDL goal <100    - atorvastatin (LIPITOR) 10 MG tablet; Take 1 tablet (10 mg) by mouth daily  Dispense: 90 tablet; Refill: 3    4. Bipolar I disorder (H)    - Valproic acid; Future    5. Generalized edema    - Basic metabolic panel  - BNP-N terminal pro  - furosemide (LASIX) 20 MG tablet; 1-2 daily prn edema  Dispense: 60 tablet; Refill: 0  - potassium chloride SA (K-DUR/KLOR-CON M) 10 MEQ CR tablet; Take 1 with every dose of lasix  Dispense: 60 tablet; Refill: 0  - OFFICE/OUTPT VISIT,ESTLEVL II    COUNSELING:  Reviewed preventive health counseling, as reflected in patient instructions       Regular exercise       Healthy diet/nutrition         reports that he has never smoked. He has never used smokeless tobacco.    Estimated body mass index is 30.7 kg/(m^2) as calculated from the following:    Height as of 5/3/17: 5' 7\" (1.702 m).    Weight as of 5/3/17: 196 lb (88.9 kg).   Weight management plan: Discussed healthy diet and exercise guidelines and patient will follow up in 1 month in clinic to re-evaluate.    Counseling Resources:  ATP IV Guidelines  Pooled Cohorts Equation Calculator  FRAX Risk Assessment  ICSI Preventive Guidelines  Dietary Guidelines for Americans, 2010  USDA's MyPlate  ASA Prophylaxis  Lung CA Screening    Spike Tobin MD  [facility]  "

## 2017-05-26 ASSESSMENT — ANXIETY QUESTIONNAIRES: GAD7 TOTAL SCORE: 5

## 2017-05-26 ASSESSMENT — PATIENT HEALTH QUESTIONNAIRE - PHQ9: SUM OF ALL RESPONSES TO PHQ QUESTIONS 1-9: 14

## 2017-06-02 ENCOUNTER — NURSE TRIAGE (OUTPATIENT)
Dept: NURSING | Facility: CLINIC | Age: 62
End: 2017-06-02

## 2017-06-02 DIAGNOSIS — R10.9 CHRONIC ABDOMINAL PAIN: ICD-10-CM

## 2017-06-02 DIAGNOSIS — E78.5 HYPERLIPIDEMIA LDL GOAL <100: ICD-10-CM

## 2017-06-02 DIAGNOSIS — G89.29 CHRONIC ABDOMINAL PAIN: ICD-10-CM

## 2017-06-02 DIAGNOSIS — R60.1 GENERALIZED EDEMA: ICD-10-CM

## 2017-06-02 RX ORDER — POTASSIUM CHLORIDE 750 MG/1
TABLET, EXTENDED RELEASE ORAL
Qty: 15 TABLET | Refills: 0 | OUTPATIENT
Start: 2017-06-02 | End: 2017-06-26

## 2017-06-02 RX ORDER — OLANZAPINE 7.5 MG/1
7.5 TABLET, FILM COATED ORAL AT BEDTIME
Qty: 5 TABLET | Refills: 0 | OUTPATIENT
Start: 2017-06-02 | End: 2019-01-17 | Stop reason: DRUGHIGH

## 2017-06-02 RX ORDER — DIVALPROEX SODIUM 500 MG/1
1000 TABLET, DELAYED RELEASE ORAL DAILY
Qty: 10 TABLET | Refills: 0 | OUTPATIENT
Start: 2017-06-02 | End: 2020-05-04

## 2017-06-02 RX ORDER — FUROSEMIDE 20 MG
TABLET ORAL
Qty: 10 TABLET | Refills: 0 | OUTPATIENT
Start: 2017-06-02 | End: 2017-06-26

## 2017-06-02 RX ORDER — ATORVASTATIN CALCIUM 10 MG/1
10 TABLET, FILM COATED ORAL DAILY
Qty: 5 TABLET | Refills: 0 | OUTPATIENT
Start: 2017-06-02 | End: 2017-06-26

## 2017-06-02 RX ORDER — GABAPENTIN 800 MG/1
800 TABLET ORAL 3 TIMES DAILY
Qty: 15 TABLET | Refills: 0 | OUTPATIENT
Start: 2017-06-02 | End: 2017-08-01

## 2017-06-02 RX ORDER — DOXEPIN HYDROCHLORIDE 25 MG/1
50 CAPSULE ORAL AT BEDTIME
Qty: 10 CAPSULE | Refills: 0 | OUTPATIENT
Start: 2017-06-02 | End: 2018-12-26

## 2017-06-03 NOTE — TELEPHONE ENCOUNTER
Pt out of town in [facility] for a family wedding and he forgot all of his medications  Called the [facility] in [place] at [phone] and gave verbal order for 5 day supple of his prescription medications  Klor Con  Zyprexa  Neurontin  Lasix  sinequal  Depakote   lipitor

## 2017-06-08 ENCOUNTER — TRANSFERRED RECORDS (OUTPATIENT)
Dept: HEALTH INFORMATION MANAGEMENT | Facility: CLINIC | Age: 62
End: 2017-06-08

## 2017-06-14 ENCOUNTER — TRANSFERRED RECORDS (OUTPATIENT)
Dept: HEALTH INFORMATION MANAGEMENT | Facility: CLINIC | Age: 62
End: 2017-06-14

## 2017-06-16 DIAGNOSIS — R60.1 GENERALIZED EDEMA: ICD-10-CM

## 2017-06-16 NOTE — TELEPHONE ENCOUNTER
Pending Prescriptions:                       Disp   Refills    furosemide (LASIX) 20 MG tablet [Pharmacy*60 tab*             Sig: TAKE 1 TO 2 TABLETS BY MOUTH DAILY AS NEEDED FOR           EDEMA    potassium chloride (K-TAB,KLOR-CON) 10 ME*60 tab*             Sig: TAKE 1 TABLET BY MOUTH WITH EVERY DOSE OF           FUROSEMIDE          Last Written Prescription Date: 06/02/2017  Last Fill Quantity: 10/15, # refills: 0  Last Office Visit with [facility], [facility] or [facility] prescribing provider: 05/25/2017  Next 5 appointments (look out 90 days)     Jun 26, 2017  2:00 PM CDT   Office Visit with Maribeth Ardon MD   [facility]    [address] [facility] [address] [phone]                   Potassium   Date Value Ref Range Status   05/25/2017 4.0 3.4 - 5.3 mmol/L Final     Creatinine   Date Value Ref Range Status   05/25/2017 0.72 0.66 - 1.25 mg/dL Final     BP Readings from Last 3 Encounters:   05/25/17 132/86   05/03/17 100/60   03/29/17 121/83

## 2017-06-19 RX ORDER — FUROSEMIDE 20 MG
TABLET ORAL
Start: 2017-06-19

## 2017-06-19 RX ORDER — POTASSIUM CHLORIDE 750 MG/1
TABLET, EXTENDED RELEASE ORAL
Start: 2017-06-19

## 2017-06-19 NOTE — TELEPHONE ENCOUNTER
Patient was given refill 6/2.  Further refills will be addressed when he sees AS 6/26.  Wandy Pena RN

## 2017-06-26 ENCOUNTER — OFFICE VISIT (OUTPATIENT)
Dept: FAMILY MEDICINE | Facility: CLINIC | Age: 62
End: 2017-06-26
Payer: COMMERCIAL

## 2017-06-26 VITALS
DIASTOLIC BLOOD PRESSURE: 88 MMHG | WEIGHT: 235.6 LBS | TEMPERATURE: 98.1 F | RESPIRATION RATE: 17 BRPM | OXYGEN SATURATION: 100 % | HEART RATE: 90 BPM | SYSTOLIC BLOOD PRESSURE: 136 MMHG | BODY MASS INDEX: 36.98 KG/M2 | HEIGHT: 67 IN

## 2017-06-26 DIAGNOSIS — G25.81 RESTLESS LEGS SYNDROME (RLS): ICD-10-CM

## 2017-06-26 DIAGNOSIS — E85.89 OTHER AMYLOIDOSIS (H): ICD-10-CM

## 2017-06-26 DIAGNOSIS — G62.9 NEUROPATHY: ICD-10-CM

## 2017-06-26 DIAGNOSIS — F31.9 BIPOLAR I DISORDER (H): ICD-10-CM

## 2017-06-26 DIAGNOSIS — R53.83 OTHER FATIGUE: ICD-10-CM

## 2017-06-26 DIAGNOSIS — E11.9 TYPE 2 DIABETES MELLITUS WITHOUT COMPLICATION, WITHOUT LONG-TERM CURRENT USE OF INSULIN (H): ICD-10-CM

## 2017-06-26 DIAGNOSIS — R60.1 GENERALIZED EDEMA: ICD-10-CM

## 2017-06-26 DIAGNOSIS — D64.9 ANEMIA, UNSPECIFIED TYPE: Primary | ICD-10-CM

## 2017-06-26 DIAGNOSIS — E78.5 HYPERLIPIDEMIA LDL GOAL <100: ICD-10-CM

## 2017-06-26 DIAGNOSIS — G47.33 OBSTRUCTIVE SLEEP APNEA SYNDROME: ICD-10-CM

## 2017-06-26 LAB
ANION GAP SERPL CALCULATED.3IONS-SCNC: 8 MMOL/L (ref 3–14)
BASOPHILS # BLD AUTO: 0.1 10E9/L (ref 0–0.2)
BASOPHILS NFR BLD AUTO: 1.1 %
BUN SERPL-MCNC: 16 MG/DL (ref 7–30)
CALCIUM SERPL-MCNC: 8.2 MG/DL (ref 8.5–10.1)
CHLORIDE SERPL-SCNC: 106 MMOL/L (ref 94–109)
CO2 SERPL-SCNC: 26 MMOL/L (ref 20–32)
CREAT SERPL-MCNC: 0.84 MG/DL (ref 0.66–1.25)
DIFFERENTIAL METHOD BLD: ABNORMAL
EOSINOPHIL # BLD AUTO: 0.6 10E9/L (ref 0–0.7)
EOSINOPHIL NFR BLD AUTO: 10.6 %
ERYTHROCYTE [DISTWIDTH] IN BLOOD BY AUTOMATED COUNT: 16.1 % (ref 10–15)
FERRITIN SERPL-MCNC: 150 NG/ML (ref 26–388)
GFR SERPL CREATININE-BSD FRML MDRD: ABNORMAL ML/MIN/1.7M2
GLUCOSE SERPL-MCNC: 236 MG/DL (ref 70–99)
HCT VFR BLD AUTO: 28.4 % (ref 40–53)
HGB BLD-MCNC: 9.6 G/DL (ref 13.3–17.7)
LYMPHOCYTES # BLD AUTO: 0.8 10E9/L (ref 0.8–5.3)
LYMPHOCYTES NFR BLD AUTO: 14.2 %
MCH RBC QN AUTO: 33.9 PG (ref 26.5–33)
MCHC RBC AUTO-ENTMCNC: 33.8 G/DL (ref 31.5–36.5)
MCV RBC AUTO: 100 FL (ref 78–100)
MONOCYTES # BLD AUTO: 0.5 10E9/L (ref 0–1.3)
MONOCYTES NFR BLD AUTO: 9.7 %
NEUTROPHILS # BLD AUTO: 3.6 10E9/L (ref 1.6–8.3)
NEUTROPHILS NFR BLD AUTO: 64.4 %
PLATELET # BLD AUTO: 134 10E9/L (ref 150–450)
POTASSIUM SERPL-SCNC: 4 MMOL/L (ref 3.4–5.3)
RBC # BLD AUTO: 2.83 10E12/L (ref 4.4–5.9)
RETICS # AUTO: 123.3 10E9/L (ref 25–95)
RETICS/RBC NFR AUTO: 4.2 % (ref 0.5–2)
SODIUM SERPL-SCNC: 140 MMOL/L (ref 133–144)
WBC # BLD AUTO: 5.6 10E9/L (ref 4–11)

## 2017-06-26 PROCEDURE — 99215 OFFICE O/P EST HI 40 MIN: CPT | Performed by: FAMILY MEDICINE

## 2017-06-26 PROCEDURE — 85060 BLOOD SMEAR INTERPRETATION: CPT | Performed by: FAMILY MEDICINE

## 2017-06-26 PROCEDURE — 82607 VITAMIN B-12: CPT | Performed by: FAMILY MEDICINE

## 2017-06-26 PROCEDURE — 36415 COLL VENOUS BLD VENIPUNCTURE: CPT | Performed by: FAMILY MEDICINE

## 2017-06-26 PROCEDURE — 82728 ASSAY OF FERRITIN: CPT | Performed by: FAMILY MEDICINE

## 2017-06-26 PROCEDURE — 85045 AUTOMATED RETICULOCYTE COUNT: CPT | Performed by: FAMILY MEDICINE

## 2017-06-26 PROCEDURE — 80048 BASIC METABOLIC PNL TOTAL CA: CPT | Performed by: FAMILY MEDICINE

## 2017-06-26 PROCEDURE — 85025 COMPLETE CBC W/AUTO DIFF WBC: CPT | Performed by: FAMILY MEDICINE

## 2017-06-26 PROCEDURE — 82746 ASSAY OF FOLIC ACID SERUM: CPT | Performed by: FAMILY MEDICINE

## 2017-06-26 RX ORDER — POTASSIUM CHLORIDE 750 MG/1
TABLET, EXTENDED RELEASE ORAL
Qty: 30 TABLET | Refills: 1 | Status: SHIPPED | OUTPATIENT
Start: 2017-06-26 | End: 2017-08-01

## 2017-06-26 RX ORDER — ATORVASTATIN CALCIUM 10 MG/1
10 TABLET, FILM COATED ORAL DAILY
Qty: 90 TABLET | Refills: 3 | Status: SHIPPED | OUTPATIENT
Start: 2017-06-26 | End: 2017-10-24

## 2017-06-26 RX ORDER — FUROSEMIDE 20 MG
TABLET ORAL
Qty: 30 TABLET | Refills: 1 | Status: SHIPPED | OUTPATIENT
Start: 2017-06-26 | End: 2017-08-01

## 2017-06-26 RX ORDER — PRAMIPEXOLE DIHYDROCHLORIDE 0.12 MG/1
0.12 TABLET ORAL AT BEDTIME
Qty: 30 TABLET | Refills: 1 | Status: SHIPPED | OUTPATIENT
Start: 2017-06-26 | End: 2017-08-01

## 2017-06-26 NOTE — MR AVS SNAPSHOT
After Visit Summary   6/26/2017    Parmjit Hernández    MRN: [id number]           Patient Information     Date Of Birth          1955        Visit Information        Provider Department      6/26/2017 2:00 PM Maribeth Ardon MD [facility]        Today's Diagnoses     Anemia, unspecified type    -  1    Other amyloidosis (H)        Neuropathy (H)        Hyperlipidemia LDL goal <100        Generalized edema        Type 2 diabetes mellitus without complication, without long-term current use of insulin (H)        Bipolar I disorder (H)        Obstructive sleep apnea syndrome        Other fatigue        Restless legs syndrome (RLS)           Follow-ups after your visit        Additional Services     NEUROLOGY ADULT REFERRAL       Your provider has referred you to: [facility]: Neurology Clinic [facility] [phone]   [email or web address]  General Neurology  [facility]: [facility] [phone]   [email or web address]  [facility]: [facility] Neurological [facility]  [place] [phone]   [email or web address]    Reason for Referral: Consult    Please be aware that coverage of these services is subject to the terms and limitations of your health insurance plan.  Call member services at your health plan with any benefit or coverage questions.      Please bring the following with you to your appointment:    (1) Any X-Rays, CTs or MRIs which have been performed.  Contact the facility where they were done to arrange for  prior to your scheduled appointment.    (2) List of current medications  (3) This referral request   (4) Any documents/labs given to you for this referral            ONC/HEME ADULT REFERRAL       Your provider has referred you to: [facility]: Adult Specialty and Infusion Clinic - [place] [phone]   [email or web address]  [facility]:  Blood and Marrow Transplant Clinic - [place] [phone]   [email or web address]  [facility]: Center for Bleeding and Clotting Disorders - [place] [phone]   [email or web address]    Please be aware that coverage of these services is subject to the terms and limitations of your health insurance plan.  Call member services at your health plan with any benefit or coverage questions.      Please bring the following with you to your appointment:    (1) Any X-Rays, CTs or MRIs which have been performed.  Contact the facility where they were done to arrange for  prior to your scheduled appointment.   (2) List of current medications  (3) This referral request   (4) Any documents/labs given to you for this referral            SLEEP EVALUATION & MANAGEMENT REFERRAL - ADULT       Please be aware that coverage of these services is subject to the terms and limitations of your health insurance plan.  Call member services at your health plan with any benefit or coverage questions.      Please bring the following to your appointment:    >>   List of current medications   >>   This referral request   >>   Any documents/labs given to you for this referral    [facility]/Lab  Ph [phone] (Age 15 and up)                  Who to contact     If you have questions or need follow up information about today's clinic visit or your schedule please contact [facility] directly at [phone].  Normal or non-critical lab and imaging results will be communicated to you by MyChart, letter or phone within 4 business days after the clinic has received the results. If you do not hear from us within 7 days, please contact the clinic through MyChart or phone. If you have a critical or abnormal lab result, we will notify you by phone as soon as possible.  Submit refill requests through  "RealPage or call your pharmacy and they will forward the refill request to us. Please allow 3 business days for your refill to be completed.          Additional Information About Your Visit        [facility]art Information     RealPage gives you secure access to your electronic health record. If you see a primary care provider, you can also send messages to your care team and make appointments. If you have questions, please call your primary care clinic.  If you do not have a primary care provider, please call [phone] and they will assist you.        Care EveryWhere ID     This is your Care EveryWhere ID. This could be used by other organizations to access your [place] medical records  [id number]        Your Vitals Were     Pulse Temperature Respirations Height Pulse Oximetry BMI (Body Mass Index)    90 98.1  F (36.7  C) (Oral) 17 5' 7\" (1.702 m) 100% 36.9 kg/m2       Blood Pressure from Last 3 Encounters:   06/28/17 (!) 148/95   06/26/17 136/88   05/25/17 132/86    Weight from Last 3 Encounters:   06/28/17 239 lb 1.6 oz (108.5 kg)   06/26/17 235 lb 9.6 oz (106.9 kg)   05/25/17 232 lb 8 oz (105.5 kg)              We Performed the Following     Basic metabolic panel     Blood Morphology Pathologist Review     CBC with platelets differential     Ferritin     Folate     NEUROLOGY ADULT REFERRAL     ONC/HEME ADULT REFERRAL     Reticulocyte Count     Vitamin B12          Today's Medication Changes          These changes are accurate as of: 6/26/17 11:59 PM.  If you have any questions, ask your nurse or doctor.               Start taking these medicines.        Dose/Directions    pramipexole 0.125 MG tablet   Commonly known as:  MIRAPEX   Used for:  Restless legs syndrome (RLS)   Started by:  Maribeth Ardon MD        Dose:  0.125 mg   Take 1 tablet (0.125 mg) by mouth At Bedtime   Quantity:  30 tablet   Refills:  1         These medicines have changed or have updated prescriptions.        Dose/Directions    " furosemide 20 MG tablet   Commonly known as:  LASIX   This may have changed:  additional instructions   Used for:  Generalized edema   Changed by:  Maribeth Ardon MD        1 tab   Quantity:  30 tablet   Refills:  1            Where to get your medicines      These medications were [place] [facility] [place] [address]  [address], [place] [address]     Phone:  [phone]     atorvastatin 10 MG tablet    furosemide 20 MG tablet    potassium chloride SA 10 MEQ CR tablet    pramipexole 0.125 MG tablet                Primary Care Provider    None       No address on file        Equal Access to Services     [facility]: Hadii freeman rutledge Soashley, waaxda luqnavin, qaybta kaalmaselene alves, glenis mckenzie . So [facility] [phone].    ATENCIÓN: Si habla español, tiene a agudelo disposición servicios gratuitos de asistencia lingüística. [facility] [phone].    We comply with applicable federal civil rights laws and [place] laws. We do not discriminate on the basis of race, color, national origin, age, disability sex, sexual orientation or gender identity.            Thank you!     Thank you for choosing [facility]  for your care. Our goal is always to provide you with excellent care. Hearing back from our patients is one way we can continue to improve our services. Please take a few minutes to complete the written survey that you may receive in the mail after your visit with us. Thank you!             Your Updated Medication List - Protect others around you: Learn how to safely use, store and throw away your medicines at www.disposemymeds.org.          This list is accurate as of: 6/26/17 11:59 PM.  Always use your most recent med list.                   Brand Name Dispense Instructions for use Diagnosis    atorvastatin 10 MG tablet    LIPITOR    90 tablet    Take 1 tablet (10 mg) by mouth daily    Hyperlipidemia LDL goal  <100       divalproex 500 MG EC tablet    DEPAKOTE    10 tablet    Take 2 tablets (1,000 mg) by mouth daily    Hyperlipidemia LDL goal <100       doxepin 25 MG capsule    SINEquan    10 capsule    Take 2 capsules (50 mg) by mouth At Bedtime    Hyperlipidemia LDL goal <100       furosemide 20 MG tablet    LASIX    30 tablet    1 tab    Generalized edema       gabapentin 800 MG tablet    NEURONTIN    15 tablet    Take 1 tablet (800 mg) by mouth 3 times daily    Chronic abdominal pain       OLANZapine 7.5 MG tablet    zyPREXA    5 tablet    Take 1 tablet (7.5 mg) by mouth At Bedtime    Hyperlipidemia LDL goal <100, Generalized edema, Chronic abdominal pain       order for DME     1 Device    Equipment being ordered: CPAP    Obstructive sleep apnea       potassium chloride SA 10 MEQ CR tablet    K-DUR/KLOR-CON M    30 tablet    Take 1 with every dose of lasix    Generalized edema       pramipexole 0.125 MG tablet    MIRAPEX    30 tablet    Take 1 tablet (0.125 mg) by mouth At Bedtime    Restless legs syndrome (RLS)       UNKNOWN MED DOSAGE

## 2017-06-26 NOTE — PROGRESS NOTES
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 (Narcotics)  He reports his main concern is fatigue , that is hard to keep him alert and awake during the classes. 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 Jose F Matthew for mental health and gets Depakote,doxepin and olanzapine for mental health/ bipolar depression        He states he would like to consider trying Lyrica for    Peripheral neuropathy instead of gabapentin as its still quite bugs him  in leg and it feels like creepy crawly tightness in legs . He reports he had a History of restless leg syndrome- induced by psychiatrist medications    He reports his peripehral neuropathy feels like  sharp burning constant pain, ,like jelly fish shooting pain- and an urge to move about, he feels it Epifanio sitting- moving helps  "a bit, Walking,  Helps , and its Worse in bed at night    He reports 7 yr ago he was  300 lbs, then lost weight down to 180 and now at 235- , he had Diabetes  And it improved with weight loss and now he has gained a lot of weight back        Very fatigued- falls asleep in meeting, during the day, classes- [facility]  In am class and pn- study time  At the end of 13 months- will get a job- as a part of the plan- not sure if he agrees with the plan- but that's a part of the plan    Edema - 2 bags of pop corn    Has hgb completed 6/161/17- it was 9.9     ROS:  Constitutional, HEENT, cardiovascular, pulmonary, gi and gu systems are negative, except as otherwise noted.    OBJECTIVE:     /88  Pulse 90  Temp 98.1  F (36.7  C) (Oral)  Resp 17  Ht 5' 7\" (1.702 m)  Wt 235 lb 9.6 oz (106.9 kg)  SpO2 100%  BMI 36.9 kg/m2  Body mass index is 36.9 kg/(m^2).  GENERAL: healthy, alert and no distress  NECK: no adenopathy, no asymmetry, masses, or scars and thyroid normal to palpation  RESP: lungs clear to auscultation - no rales, rhonchi or wheezes  CV: regular rate and rhythm, normal S1 S2, no S3 or S4, no murmur, click or rub, no peripheral edema and peripheral pulses strong  ABDOMEN: soft, nontender, no hepatosplenomegaly, no masses and bowel sounds normal  MS: no gross musculoskeletal defects noted, no edema    Diagnostic Test Results:    ASSESSMENT/PLAN:     (D64.9) Anemia, unspecified type  (primary encounter diagnosis) & fatigue   Complex medical history. Anemia could be anemia of chronic disease due to amyloidosis. Iron studes, alongwith folate abdomen B 12 m with peripeheral smear is sent. I suspect that his fatigue is multifactorial and not just from  Anemia. He reports his recent hgb in [place] was alos low, we dont have any recent records sent. For now i recommend complete work up for the anemia with peripeheral smear and levels of B 12 , folate obtained. Will see if need " for iron supplement. Will see if microcytic anemia and low iron, besides gieving him suplemental iron- we would have to investigate further source of iron deficincy anemia  Plan: ONC/HEME ADULT REFERRAL, Folate, Vitamin B12      (R53.83) Other fatigue  (G47.33) Obstructive sleep apnea syndrome  Comment: sleep study  Plan: i suspect that fatigue is possibly a result of sleep apnea  He does not have the machine and he has plans to aks a firned to get the CPAP machine but he has not had one for yrs and seem like mas is ill fited  I do recommend SLEEP EVALUATION & MANAGEMENT REFERRAL - ADULT  Plan: will also do work up to determine the cuase of anemia   Iron and iron binding capacity FUTURE 14d,         Ferritin, CBC with platelets differential,         Reticulocyte Count, Blood Morphology         Pathologist Review, ONC/HEME ADULT REFERRAL,         CANCELED: CBC with platelets               (G25.81) Restless legs syndrome (RLS)  Plan:i suspect that his symptoms are not just from  His neuropathy but due to restless leg syndrome  Will see how he reponds to  pramipexole (MIRAPEX) 0.125 MG tablet, bedtime   Will check Basic metabolic panel  nate have him be seen by neurology as needed  For neuroathy.  No need for lyrca or gabapentin for now- will discuss it further in follow up       (E85.8) Other amyloidosis (H)  Comment: Ming Bunch- [facility] hematologist,-annual follow up  Locally sees-Dr Fonseca- has a follow up July 17th  Plan: Will try to reach out hematology to evaluate him earlier      (R60.1) Generalized edema  Plan: he reports he had been taking furosemide (LASIX) 20 MG tablet,  1-3 tab once daily . i recommend a consistent davis of 2 tabs a day  Keep legs elevated' will check potassium  As well, for now to continue potassium 10 meq once daily         chloride SA (K-DUR/KLOR-CON M) 10 MEQ CR tablet      (E11.9) Type 2 diabetes mellitus without complication, without long-term current use of insulin  (H)  Plan:Diet control    (F31.9) Bipolar I disorder (H)  Plan:Dr. Jose F Matthew for mental health and gets Depakote,doxepin and olanzapine for mental health/ bipolar depression      (G62.9) Neuropathy (H)  Plan: NEUROLOGY ADULT REFERRAL        (E78.5) Hyperlipidemia LDL goal <100  Plan: atorvastatin (LIPITOR) 10 MG tablet    The patient indicates understanding of these issues and agrees with the plan.  Total tme spent in  55 mins, more than 12 the time in counseling and cordinating above care  Should have a follow up in couple days  The patient indicates understanding of these issues and agrees with the plan.      Maribeth Ardon MD  [facility]    The 10-year ASCVD risk score (Janine SIMON Jr, et al., 2013) is: 16.5%    Values used to calculate the score:      Age: 61 years      Sex: Male      Is Non- : No      Diabetic: Yes      Tobacco smoker: No      Systolic Blood Pressure: 136 mmHg      Is BP treated: No      HDL Cholesterol: 47 mg/dL      Total Cholesterol: 177 mg/dL

## 2017-06-27 LAB
FOLATE SERPL-MCNC: 12.1 NG/ML
VIT B12 SERPL-MCNC: 529 PG/ML (ref 193–986)

## 2017-06-28 ENCOUNTER — OFFICE VISIT (OUTPATIENT)
Dept: FAMILY MEDICINE | Facility: CLINIC | Age: 62
End: 2017-06-28
Payer: COMMERCIAL

## 2017-06-28 DIAGNOSIS — G25.81 RESTLESS LEGS SYNDROME (RLS): ICD-10-CM

## 2017-06-28 DIAGNOSIS — R53.83 OTHER FATIGUE: Primary | ICD-10-CM

## 2017-06-28 DIAGNOSIS — G47.33 OBSTRUCTIVE SLEEP APNEA: ICD-10-CM

## 2017-06-28 DIAGNOSIS — D64.89 ANEMIA DUE TO OTHER CAUSE: ICD-10-CM

## 2017-06-28 DIAGNOSIS — I10 HYPERTENSION GOAL BP (BLOOD PRESSURE) < 140/90: ICD-10-CM

## 2017-06-28 LAB
COPATH REPORT: NORMAL
ERYTHROCYTE [DISTWIDTH] IN BLOOD BY AUTOMATED COUNT: 16.7 % (ref 10–15)
HCT VFR BLD AUTO: 30 % (ref 40–53)
HGB BLD-MCNC: 9.8 G/DL (ref 13.3–17.7)
MCH RBC QN AUTO: 34.1 PG (ref 26.5–33)
MCHC RBC AUTO-ENTMCNC: 32.7 G/DL (ref 31.5–36.5)
MCV RBC AUTO: 105 FL (ref 78–100)
PLATELET # BLD AUTO: 145 10E9/L (ref 150–450)
RBC # BLD AUTO: 2.87 10E12/L (ref 4.4–5.9)
WBC # BLD AUTO: 5.2 10E9/L (ref 4–11)

## 2017-06-28 PROCEDURE — 36415 COLL VENOUS BLD VENIPUNCTURE: CPT | Performed by: FAMILY MEDICINE

## 2017-06-28 PROCEDURE — 83550 IRON BINDING TEST: CPT | Performed by: FAMILY MEDICINE

## 2017-06-28 PROCEDURE — 99214 OFFICE O/P EST MOD 30 MIN: CPT | Performed by: FAMILY MEDICINE

## 2017-06-28 PROCEDURE — 85027 COMPLETE CBC AUTOMATED: CPT | Performed by: FAMILY MEDICINE

## 2017-06-28 PROCEDURE — 83540 ASSAY OF IRON: CPT | Performed by: FAMILY MEDICINE

## 2017-06-28 RX ORDER — LISINOPRIL 10 MG/1
10 TABLET ORAL DAILY
Qty: 30 TABLET | Refills: 1 | Status: SHIPPED | OUTPATIENT
Start: 2017-06-28 | End: 2017-09-16

## 2017-06-28 NOTE — MR AVS SNAPSHOT
After Visit Summary   6/28/2017    Parmjit Hernández    MRN: [id number]           Patient Information     Date Of Birth          1955        Visit Information        Provider Department      6/28/2017 2:15 PM Maribeth Ardon MD [facility]        Today's Diagnoses     Other fatigue    -  1    Anemia due to other cause          Care Instructions    Need bone marrow Biopty, to determine the final cause- best to be evaluated by the  Hematologist  i do think oral iron will help much    Fatigue and sleep is future complicated by sleep apnea and need for CPAP  [facility] - [phone] for sleep study    Blood pressure is high and i do receommend to start treatment consistenetly          Follow-ups after your visit        Additional Services     ONC/HEME ADULT REFERRAL       Your provider has referred you to: [facility]: Adult Specialty and Infusion Clinic - [place] [phone]   [email or web address]  [facility]: Blood and Marrow Transplant Clinic - [place] [phone]   [email or web address]  [facility]: Center for Bleeding and Clotting Disorders - [place] [phone]   [email or web address]-[email or web address]  [facility]: [facility] Cancer and Hematology Clinics [place] [phone]   [email or web address]-cancer-clinic-[facility]/    Please be aware that coverage of these services is subject to the terms and limitations of your health insurance plan.  Call member services at your health plan with any benefit or coverage questions.      Please bring the following with you to your appointment:    (1) Any X-Rays, CTs or MRIs which have been performed.  Contact the facility where they were done to arrange for  prior to  "your scheduled appointment.   (2) List of current medications  (3) This referral request   (4) Any documents/labs given to you for this referral                  Who to contact     If you have questions or need follow up information about today's clinic visit or your schedule please contact [facility] directly at [phone].  Normal or non-critical lab and imaging results will be communicated to you by MyChart, letter or phone within 4 business days after the clinic has received the results. If you do not hear from us within 7 days, please contact the clinic through [employer] or phone. If you have a critical or abnormal lab result, we will notify you by phone as soon as possible.  Submit refill requests through KnowFu or call your pharmacy and they will forward the refill request to us. Please allow 3 business days for your refill to be completed.          Additional Information About Your Visit        MyChart Information     [employer] gives you secure access to your electronic health record. If you see a primary care provider, you can also send messages to your care team and make appointments. If you have questions, please call your primary care clinic.  If you do not have a primary care provider, please call [phone] and they will assist you.        Care EveryWhere ID     This is your Care EveryWhere ID. This could be used by other organizations to access your [place] medical records  [id number]        Your Vitals Were     Pulse Temperature Height Pulse Oximetry BMI (Body Mass Index)       94 97.5  F (36.4  C) (Oral) 5' 7\" (1.702 m) 100% 37.45 kg/m2        Blood Pressure from Last 3 Encounters:   06/28/17 (!) 148/95   06/26/17 136/88   05/25/17 132/86    Weight from Last 3 Encounters:   06/28/17 239 lb 1.6 oz (108.5 kg)   06/26/17 235 lb 9.6 oz (106.9 kg)   05/25/17 232 lb 8 oz (105.5 kg)              We Performed the Following     **Iron and iron binding capacity FUTURE 14d     CBC with " platelets     ONC/HEME ADULT REFERRAL        Primary Care Provider    None       No address on file        Equal Access to Services     JUSTYNA ELDER : Hadii aad ku aamir Sosusanali, wagilsonda luqadaha, perryta kaeneidada mistyevgeny, glenis fraserin hayaaitzel jaymitzi acuna lajohnitzel williamson. So [facility] [phone].    ATENCIÓN: Si habla español, tiene a agudelo disposición servicios gratuitos de asistencia lingüística. LlFort [facility] Hospital [phone].    We comply with applicable federal civil rights laws and [place] laws. We do not discriminate on the basis of race, color, national origin, age, disability sex, sexual orientation or gender identity.            Thank you!     Thank you for choosing [facility]  for your care. Our goal is always to provide you with excellent care. Hearing back from our patients is one way we can continue to improve our services. Please take a few minutes to complete the written survey that you may receive in the mail after your visit with us. Thank you!             Your Updated Medication List - Protect others around you: Learn how to safely use, store and throw away your medicines at www.disposemymeds.org.          This list is accurate as of: 6/28/17  3:57 PM.  Always use your most recent med list.                   Brand Name Dispense Instructions for use Diagnosis    atorvastatin 10 MG tablet    LIPITOR    90 tablet    Take 1 tablet (10 mg) by mouth daily    Hyperlipidemia LDL goal <100       divalproex 500 MG EC tablet    DEPAKOTE    10 tablet    Take 2 tablets (1,000 mg) by mouth daily    Hyperlipidemia LDL goal <100       doxepin 25 MG capsule    SINEquan    10 capsule    Take 2 capsules (50 mg) by mouth At Bedtime    Hyperlipidemia LDL goal <100       furosemide 20 MG tablet    LASIX    30 tablet    1 tab    Generalized edema       gabapentin 800 MG tablet    NEURONTIN    15 tablet    Take 1 tablet (800 mg) by mouth 3 times daily    Chronic abdominal pain       OLANZapine 7.5 MG tablet    zyPREXA    5  tablet    Take 1 tablet (7.5 mg) by mouth At Bedtime    Hyperlipidemia LDL goal <100, Generalized edema, Chronic abdominal pain       order for DME     1 Device    Equipment being ordered: CPAP    Obstructive sleep apnea       potassium chloride SA 10 MEQ CR tablet    K-DUR/KLOR-CON M    30 tablet    Take 1 with every dose of lasix    Generalized edema       pramipexole 0.125 MG tablet    MIRAPEX    30 tablet    Take 1 tablet (0.125 mg) by mouth At Bedtime    Restless legs syndrome (RLS)       UNKNOWN MED DOSAGE

## 2017-06-28 NOTE — PROGRESS NOTES
Hi    One of the blood markers shows that the anemia could be related to hemolysis and we will discuss that further in office visit today  The iron stores are within normal limits  Vitamin b 12 and folate - normal as well  Blood glucose is high  -Kidney function (GFR) is normal.  -Sodium is normal.  -Potassium is normal.

## 2017-06-28 NOTE — NURSING NOTE
"Chief Complaint   Patient presents with     Anemia       Initial BP (!) 148/95  Pulse 94  Temp 97.5  F (36.4  C) (Oral)  Ht 5' 7\" (1.702 m)  Wt 239 lb 1.6 oz (108.5 kg)  SpO2 100%  BMI 37.45 kg/m2 Estimated body mass index is 37.45 kg/(m^2) as calculated from the following:    Height as of this encounter: 5' 7\" (1.702 m).    Weight as of this encounter: 239 lb 1.6 oz (108.5 kg).  Medication Reconciliation: complete      Health Maintenance that is potentially due pending provider review:  NONE    n/a    BAR Mcfar[place]  "

## 2017-06-28 NOTE — PROGRESS NOTES
"  SUBJECTIVE:                                                    Parmjit Hernández is a 61 year old male who presents to clinic today for the following health issues:      Anemia      Duration: Months     Description (location/character/radiation): n/a    Intensity:  severe    Accompanying signs and symptoms: fatigue and weakness     History (similar episodes/previous evaluation): None    Therapies tried and outcome: patient was seen on 6/26/2017    Patient reports he does Hope to get iron shots, or iron pills, so that he can survive a few days as feeling very tired, falling asleep waiting Hallucinate, as fall asleep, In chapal felt people moving around him and nothing had change,Same feeling watching car outside and imagine different traffic pattern, as if his Brain fast forwards imaginary reality  He reports he is Worried about- his brain power is not same, and wondering about brain damage from  Lack of oxygen    He took mirapex for 2 nights, first night not good, again had crawly feeling, jelly fish stings in leg, last night after mirapex slet the bets in longest time, not sure if from medications or was too tired       PROBLEMS TO ADD ON...    Problem list and histories reviewed & adjusted, as indicated.  Additional history: as documented    Labs reviewed in EPIC    Reviewed and updated as needed this visit by clinical staff       Reviewed and updated as needed this visit by Provider         ROS: no drug or alcohol abuse   Constitutional, HEENT, cardiovascular, pulmonary, gi and gu systems are negative, except as otherwise noted.    OBJECTIVE:     BP (!) 148/95  Pulse 94  Temp 97.5  F (36.4  C) (Oral)  Ht 5' 7\" (1.702 m)  Wt 239 lb 1.6 oz (108.5 kg)  SpO2 100%  BMI 37.45 kg/m2  Body mass index is 37.45 kg/(m^2).  GENERAL: healthy, alert and no distress  RESP: lungs clear to auscultation - no rales, rhonchi or wheezes  CV: regular rate and rhythm, normal S1 S2, no S3 or S4, no murmur, click or rub, no " peripheral edema and peripheral pulses strong  NEURO: Normal strength and tone, mentation intact and speech normal  PSYCH: mentation appears normal, affect normal/bright    Diagnostic Test Results:  none     ASSESSMENT/PLAN:   (R53.83) Other fatigue  (primary encounter diagnosis)  Plan: CBC with platelets, ONC/HEME ADULT REFERRAL  We reviewed his most recent complete blood count that he was able to down load from  [facility] my chart portal and it was low hemoglobin 9.6, with indices quite similar. 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 peripehral smear is pending. B 12, and folate normal. Iron studies normal and his incides have gone by consistently    (G47.33) Obstructive sleep apnea  Plan: we also discussed in detail that another factir contributing to his fatigue is that he is not using CPAP. He reports a friend might bring it to him but it wont be before saturday. He has not had a repeat sleep study over 10 yrs and i wonder if the mask and settings are Up to date - a new refferal is provided     (I10) Hypertension goal BP (blood pressure) < 140/90  Plan: lisinopril (PRINIVIL/ZESTRIL) 10 MG tablet  He also takes lasix 20 mg 2 tab for peripheral edema     He reports he did take Mirapex and on second night he has slept the best in years- will continue mirapex and see if the sleep improves because of restless leg syndrome treatment     Mental health- stable- no medications changes made  Follow up in 1 month, earlier if concerns    The patient indicates understanding of these issues and agrees with the plan.      Maribeth Ardon MD  [facility]

## 2017-06-28 NOTE — PATIENT INSTRUCTIONS
Need bone marrow Biopty, to determine the final cause- best to be evaluated by the  Hematologist  i do think oral iron will help much    Fatigue and sleep is future complicated by sleep apnea and need for CPAP  [facility] [phone] for sleep study    Blood pressure is high and i do receommend to start treatment consistenetly

## 2017-06-29 PROBLEM — G47.33 OBSTRUCTIVE SLEEP APNEA SYNDROME: Status: ACTIVE | Noted: 2017-06-29

## 2017-06-29 PROBLEM — E11.9 TYPE 2 DIABETES MELLITUS WITHOUT COMPLICATION, WITHOUT LONG-TERM CURRENT USE OF INSULIN (H): Status: ACTIVE | Noted: 2017-06-29

## 2017-06-29 LAB
IRON SATN MFR SERPL: 20 % (ref 15–46)
IRON SERPL-MCNC: 77 UG/DL (ref 35–180)
TIBC SERPL-MCNC: 394 UG/DL (ref 240–430)

## 2017-06-29 NOTE — PROGRESS NOTES
Hi    We discussed the labs today and the peripheral smear need to be repeated and it can be done either at hematology clinic or lab only appointment at [facility]

## 2017-06-30 VITALS
HEIGHT: 67 IN | TEMPERATURE: 97.5 F | WEIGHT: 239.1 LBS | BODY MASS INDEX: 37.53 KG/M2 | HEART RATE: 94 BPM | DIASTOLIC BLOOD PRESSURE: 80 MMHG | OXYGEN SATURATION: 100 % | SYSTOLIC BLOOD PRESSURE: 138 MMHG

## 2017-07-05 ENCOUNTER — TELEPHONE (OUTPATIENT)
Dept: FAMILY MEDICINE | Facility: CLINIC | Age: 62
End: 2017-07-05

## 2017-07-05 NOTE — TELEPHONE ENCOUNTER
His insurance doesn't require referrals. If seen at [facility] they require a referral from PCP. I guess if patient is going to be seeing a  doctor for his PCP then he should be seeing a Neurologist in our network. Patient was given  A referral by Dr Ardon so I think that's where patient should be seen.     Joelle Wilson  Referral Coorinator

## 2017-07-05 NOTE — TELEPHONE ENCOUNTER
Left message for Jamar at facility where pt lives to call us back.  AS referred pt to:  [facility]: Neurology Clinic [facility] [phone]   [email or web address]  General Neurology  [facility]: [facility] Neurology [facility] [phone]   [email or web address]  N: [facility] Neurological [facility] [phone]   [email or web address]"    Why is he going to [facility]?  Stacie MAYA RN

## 2017-07-05 NOTE — TELEPHONE ENCOUNTER
Joelle,  Please see below.  Pt was referred to neurology in [facility].  Not sure why going to [facility].  Is [facility] in his network or does he need to come to .  Can fax referral, but please advise.  Stacie MAYA RN

## 2017-07-05 NOTE — TELEPHONE ENCOUNTER
Reason for Call:  Other     Detailed comments: patient was to be seen at [facility] neurology for peripheral neuropathy they require a referral be faxed before an appointment can be made fax is      Phone Number Patient can be reached at: [phone] Jamar at [facility] where patient resides    Best Time: any    Can we leave a detailed message on this number? YES    Call taken on 7/5/2017 at 12:54 PM by Latrice Mcgregor

## 2017-07-05 NOTE — TELEPHONE ENCOUNTER
Jamar from [facility] calling back [phone]  To say that a referral is not in place with the Blood and Marrow   Clinic   Please fax to them [phone]  Jamar would like to be informed when this is done

## 2017-07-06 NOTE — TELEPHONE ENCOUNTER
Left  for Jamar to callback.  See below issue regarding neurology appt, also now there is a message about blood and marrow clinic not having referral.  There is a hematology referral from 6/28/2017  Stacie MAYA RN

## 2017-07-06 NOTE — TELEPHONE ENCOUNTER
Referral faxed so they can call #s on referral and help get pt scheduled with hematology  Stacie MAYA RN

## 2017-07-06 NOTE — TELEPHONE ENCOUNTER
Jamar called back and asked that we Fax the Hematology Referral to    [phone]  Jamar will call the Neurology clinic and schedule the patient

## 2017-07-07 NOTE — TELEPHONE ENCOUNTER
"If they need something further they can callback  Looks like referral will be \"walked over\" to hematology dept.  Faxed referral earlier this week to Jamar at 's facility where he lives.  Not sure what the issue is below.  Stacie MAYA RN    "

## 2017-07-07 NOTE — TELEPHONE ENCOUNTER
[facility] just called back and said that the fax  Went to the wrong dept. He is going to take it over the the  Hematology dept. For you.

## 2017-07-08 ENCOUNTER — MYC MEDICAL ADVICE (OUTPATIENT)
Dept: FAMILY MEDICINE | Facility: CLINIC | Age: 62
End: 2017-07-08

## 2017-07-08 DIAGNOSIS — E11.69 TYPE 2 DIABETES MELLITUS WITH OTHER SPECIFIED COMPLICATION (H): Primary | ICD-10-CM

## 2017-07-10 ENCOUNTER — TRANSFERRED RECORDS (OUTPATIENT)
Dept: HEALTH INFORMATION MANAGEMENT | Facility: CLINIC | Age: 62
End: 2017-07-10

## 2017-07-10 ENCOUNTER — MEDICAL CORRESPONDENCE (OUTPATIENT)
Dept: HEALTH INFORMATION MANAGEMENT | Facility: CLINIC | Age: 62
End: 2017-07-10

## 2017-07-10 PROBLEM — E11.69 TYPE 2 DIABETES MELLITUS WITH OTHER SPECIFIED COMPLICATION (H): Status: ACTIVE | Noted: 2017-07-10

## 2017-07-11 NOTE — TELEPHONE ENCOUNTER
Please call patient - about which prescriptions need to be sent to which pharmacy before his appointment on 7/26.  i just sent a [employer] message as well-we can wait for the response but i needed more information based n his mychart questions    We might have to start evisit with him- as he has often very important multiple questions-

## 2017-07-11 NOTE — TELEPHONE ENCOUNTER
Unable to call pt; will have to wait for his MyChart response  Home and cell #'s patient had in his demographics was for [place] [employer]; pt left program, no longer there.  Stacie MAYA RN

## 2017-07-17 ENCOUNTER — TRANSFERRED RECORDS (OUTPATIENT)
Dept: HEALTH INFORMATION MANAGEMENT | Facility: CLINIC | Age: 62
End: 2017-07-17

## 2017-08-01 ENCOUNTER — OFFICE VISIT (OUTPATIENT)
Dept: FAMILY MEDICINE | Facility: CLINIC | Age: 62
End: 2017-08-01
Payer: COMMERCIAL

## 2017-08-01 VITALS
WEIGHT: 222 LBS | HEART RATE: 101 BPM | BODY MASS INDEX: 34.84 KG/M2 | RESPIRATION RATE: 16 BRPM | TEMPERATURE: 98.7 F | DIASTOLIC BLOOD PRESSURE: 80 MMHG | HEIGHT: 67 IN | OXYGEN SATURATION: 100 % | SYSTOLIC BLOOD PRESSURE: 128 MMHG

## 2017-08-01 DIAGNOSIS — R10.9 CHRONIC ABDOMINAL PAIN: ICD-10-CM

## 2017-08-01 DIAGNOSIS — G25.81 RESTLESS LEGS SYNDROME (RLS): ICD-10-CM

## 2017-08-01 DIAGNOSIS — G43.D1 ABDOMINAL MIGRAINE, INTRACTABLE: Primary | ICD-10-CM

## 2017-08-01 DIAGNOSIS — G89.29 CHRONIC ABDOMINAL PAIN: ICD-10-CM

## 2017-08-01 DIAGNOSIS — F11.20 NARCOTIC DEPENDENCE (H): ICD-10-CM

## 2017-08-01 PROCEDURE — 99214 OFFICE O/P EST MOD 30 MIN: CPT | Performed by: FAMILY MEDICINE

## 2017-08-01 RX ORDER — PRAMIPEXOLE DIHYDROCHLORIDE 0.12 MG/1
0.12 TABLET ORAL AT BEDTIME
Qty: 30 TABLET | Refills: 1 | Status: SHIPPED | OUTPATIENT
Start: 2017-08-01 | End: 2017-09-13

## 2017-08-01 RX ORDER — GABAPENTIN 800 MG/1
800 TABLET ORAL 3 TIMES DAILY
Qty: 90 TABLET | Refills: 1 | Status: SHIPPED | OUTPATIENT
Start: 2017-08-01 | End: 2017-09-13

## 2017-08-01 RX ORDER — SUMATRIPTAN 5 MG/1
1-2 SPRAY NASAL PRN
Qty: 1 EACH | Refills: 3 | Status: SHIPPED | OUTPATIENT
Start: 2017-08-01 | End: 2017-11-06

## 2017-08-01 NOTE — NURSING NOTE
"Chief Complaint   Patient presents with     RECHECK     initial /80 (BP Location: Right arm, Cuff Size: Adult Large)  Pulse 101  Temp 98.7  F (37.1  C) (Oral)  Resp 16  Ht 5' 7\" (1.702 m)  Wt 222 lb (100.7 kg)  SpO2 100%  BMI 34.77 kg/m2 Estimated body mass index is 34.77 kg/(m^2) as calculated from the following:    Height as of this encounter: 5' 7\" (1.702 m).    Weight as of this encounter: 222 lb (100.7 kg).  BP completed using cuff size: large.   R arm      Health Maintenance that is potentially due pending provider review:  NONE    n/a    [employer] ma  "

## 2017-08-01 NOTE — MR AVS SNAPSHOT
After Visit Summary   8/1/2017    Parmjit Hernández    MRN: [id number]           Patient Information     Date Of Birth          1955        Visit Information        Provider Department      8/1/2017 11:30 AM Maribeth Ardon MD [facility]        Today's Diagnoses     Abdominal migraine, intractable    -  1    Chronic abdominal pain        Restless legs syndrome (RLS)        Narcotic dependence (H)           Follow-ups after your visit        Additional Services     NEUROLOGY ADULT REFERRAL       Your provider has referred you for the following:   Consult at [facility]: Neurology Clinic [facility] [phone]   [email or web address]  General Neurology    Please be aware that coverage of these services is subject to the terms and limitations of your health insurance plan.  Call member services at your health plan with any benefit or coverage questions.      Please bring the following with you to your appointment:    (1) Any X-Rays, CTs or MRIs which have been performed.  Contact the facility where they were done to arrange for  prior to your scheduled appointment.    (2) List of current medications  (3) This referral request   (4) Any documents/labs given to you for this referral                  Who to contact     If you have questions or need follow up information about today's clinic visit or your schedule please contact [facility] directly at [phone].  Normal or non-critical lab and imaging results will be communicated to you by MyChart, letter or phone within 4 business days after the clinic has received the results. If you do not hear from us within 7 days, please contact the clinic through MyChart or phone. If you have a critical or abnormal lab result, we will notify you by phone as soon as possible.  Submit refill requests through [employer] or call your pharmacy and they will forward the refill request to us. Please allow 3  "business days for your refill to be completed.          Additional Information About Your Visit        [employer] Information     [employer] gives you secure access to your electronic health record. If you see a primary care provider, you can also send messages to your care team and make appointments. If you have questions, please call your primary care clinic.  If you do not have a primary care provider, please call [phone] and they will assist you.        Care EveryWhere ID     This is your Care EveryWhere ID. This could be used by other organizations to access your [facility] medical records  [id number]        Your Vitals Were     Pulse Temperature Respirations Height Pulse Oximetry BMI (Body Mass Index)    101 98.7  F (37.1  C) (Oral) 16 5' 7\" (1.702 m) 100% 34.77 kg/m2       Blood Pressure from Last 3 Encounters:   08/01/17 128/80   06/28/17 138/80   06/26/17 136/88    Weight from Last 3 Encounters:   08/01/17 222 lb (100.7 kg)   06/28/17 239 lb 1.6 oz (108.5 kg)   06/26/17 235 lb 9.6 oz (106.9 kg)              We Performed the Following     NEUROLOGY ADULT REFERRAL          Today's Medication Changes          These changes are accurate as of: 8/1/17 12:13 PM.  If you have any questions, ask your nurse or doctor.               Start taking these medicines.        Dose/Directions    SUMAtriptan 5 MG/ACT nasal spray   Commonly known as:  IMITREX   Used for:  Abdominal migraine, intractable   Started by:  Maribeth Ardon MD        Dose:  1-2 spray   Spray 1-2 sprays in nostril as needed for migraine May repeat in 2 hours. Max 8 sprays/24 hours.   Quantity:  1 each   Refills:  3            Where to get your medicines      These medications were sent to [facility] [address] [place] [address] AT [place] [address] & [address] [address]     Phone:  [phone]     gabapentin 800 MG tablet    pramipexole 0.125 MG tablet    SUMAtriptan 5 MG/ACT nasal spray    "             Primary Care Provider Office Phone # Fax #    Maribeth Mary Ardon [phone]       [facility] [address] [facility] [address]        Equal Access to Services     JUSTYNA FRIEDMAN : Hadaranza freeman ku hadkirao Soomaali, waaxda luqadaha, qaybta kaalmada adeegarmilada, glenis alcarazn mistymitzi acuna laLeonariana williamson. So [facility] [phone].    ATENCIÓN: Si habla español, tiene a agudelo disposición servicios gratuitos de asistencia lingüística. Llame al [phone].    We comply with applicable federal civil rights laws and [place] laws. We do not discriminate on the basis of race, color, national origin, age, disability sex, sexual orientation or gender identity.            Thank you!     Thank you for choosing [facility]  for your care. Our goal is always to provide you with excellent care. Hearing back from our patients is one way we can continue to improve our services. Please take a few minutes to complete the written survey that you may receive in the mail after your visit with us. Thank you!             Your Updated Medication List - Protect others around you: Learn how to safely use, store and throw away your medicines at www.disposemymeds.org.          This list is accurate as of: 8/1/17 12:13 PM.  Always use your most recent med list.                   Brand Name Dispense Instructions for use Diagnosis    atorvastatin 10 MG tablet    LIPITOR    90 tablet    Take 1 tablet (10 mg) by mouth daily    Hyperlipidemia LDL goal <100       divalproex 500 MG EC tablet    DEPAKOTE    10 tablet    Take 2 tablets (1,000 mg) by mouth daily    Hyperlipidemia LDL goal <100       doxepin 25 MG capsule    SINEquan    10 capsule    Take 2 capsules (50 mg) by mouth At Bedtime    Hyperlipidemia LDL goal <100       gabapentin 800 MG tablet    NEURONTIN    90 tablet    Take 1 tablet (800 mg) by mouth 3 times daily    Chronic abdominal pain       lisinopril 10 MG tablet    PRINIVIL/ZESTRIL     30 tablet    Take 1 tablet (10 mg) by mouth daily    Hypertension goal BP (blood pressure) < 140/90       OLANZapine 7.5 MG tablet    zyPREXA    5 tablet    Take 1 tablet (7.5 mg) by mouth At Bedtime    Hyperlipidemia LDL goal <100, Generalized edema, Chronic abdominal pain       order for DME     1 Device    Equipment being ordered: CPAP    Obstructive sleep apnea       pramipexole 0.125 MG tablet    MIRAPEX    30 tablet    Take 1 tablet (0.125 mg) by mouth At Bedtime    Restless legs syndrome (RLS)       SUMAtriptan 5 MG/ACT nasal spray    IMITREX    1 each    Spray 1-2 sprays in nostril as needed for migraine May repeat in 2 hours. Max 8 sprays/24 hours.    Abdominal migraine, intractable

## 2017-08-03 NOTE — PROGRESS NOTES
SUBJECTIVE:                                                    Parmjit Hernández is a 62 year old male who presents to clinic today for the following health issues:      He reports constant STOMACH PAIN ,its  throbbing burning pain in upper right abdomen since 2.6yo and has never had treatment- so now feels that he is  reaching end of rope. He states he had history of migraine headache - was Under care of Dr Dr collins, neurologist- seen many years ago for history migraine headache and no headache since they as they resolved on own    He reports he feels, abdomen pain is same as mi headache - it builds up, and has an aura stomach aura, nausea and incredible discomfort , throbbing & he wants to start narcotic to control the abdomen pain  He states that Osei , (dr Conn) was prescribing narcotics for year but has had None in about 6 months- Pain was about the same  7 hoping to get it rfeilled again     He states that the final  decided diagnoses of  amyloidosis - seem stable and Gastroenterology found one thing wrong with stomach but not ultimate thing that's wrong.  He reports he has plans to Redo scoping by Gastroenterology cinthya Parekh- previously had 7 procedures, 2.5 yr ago- at [facility]. He states he will make the follow up appointment     He reports that he  has left the [facility] program- and feels good about it as felt restricted there. Edema is better- and peripheral neuropathy is better and gabapentin is helping    We discussed history of anemia, amyloidosis , he reports he was seen by dr davies was not concerned  He states his hgb  improved to 11.6- felt it was recovering- no any further concerning        PROBLEMS TO ADD ON...    Problem list and histories reviewed & adjusted, as indicated.  Additional history: as documented    Labs reviewed in EPIC    Reviewed and updated as needed this visit by clinical staff  Tobacco  Allergies  Meds  Med Hx  Soc Hx      Reviewed  "and updated as needed this visit by Provider         ROS:  Constitutional, HEENT, cardiovascular, pulmonary, gi and gu systems are negative, except as otherwise noted.      OBJECTIVE:   /80 (BP Location: Right arm, Cuff Size: Adult Large)  Pulse 101  Temp 98.7  F (37.1  C) (Oral)  Resp 16  Ht 5' 7\" (1.702 m)  Wt 222 lb (100.7 kg)  SpO2 100%  BMI 34.77 kg/m2  Body mass index is 34.77 kg/(m^2).  GENERAL: healthy, alert and no distress  NECK: no adenopathy, no asymmetry, masses, or scars and thyroid normal to palpation  RESP: lungs clear to auscultation - no rales, rhonchi or wheezes  CV: regular rate and rhythm, normal S1 S2, no S3 or S4, no murmur, click or rub, no peripheral edema and peripheral pulses strong  ABDOMEN: soft, nontender, no hepatosplenomegaly, no masses and bowel sounds normal  MS: no gross musculoskeletal defects noted, no edema  Neuro: Cranial nerves and fundi are normal. ABBY. EOM's intact. No papilledema. Neck supple. No bruits. Normal deep tendon reflexes.    Diagnostic Test Results:    ASSESSMENT/PLAN:   (G43.D1) Abdominal migraine, intractable  (primary encounter diagnosis)  Plan: we discussed in detail, that its best to avoid narcotic for stomach pain and he has plans to follow up with Gastroenterology regarding that. We also discussed that its highly likely that its abdomen migraine and for that we can try acute treatment and see the response to SUMAtriptan (IMITREX) 5 MG/ACT nasal spray,  As needed  -Potential medication side effects were discussed with the patient; let me know if any occur.  I believe its best to get  NEUROLOGY ADULT REFERRAL to further evaluate.  Patient reports he is disappointed that for the time being he is not prescribed narcotic but is understanding of the plan      (R10.9,  G89.29) Chronic abdominal pain  Plan: gabapentin (NEURONTIN) 800 MG tablet,  He takes it Three times daily - & it helps  He reports he has been taking it for sometime . We again " discussed neurology consult for possibility of ab migraine as above     (G25.81) Restless legs syndrome (RLS)  Plan: pramipexole (MIRAPEX) 0.125 MG tablet  He reports its helped without any side effects and we plan on continuing it    (F11.20) Narcotic dependence (H)  Comment: None in about 6 months- 8/1/17  Plan: he was getting narcotic from primary care physicain but has not had any given and used for past 6 months and its commendable. He did need additional counseling to avoid it further and finally was agreeable to it    We also discussed his history of amyloidosis and anemia. He reports he was seen by Dr Fonseca- no avaliable records and hgb is back up to 11.9 and he seems to be doing well and doctor advised routine check up     The patient indicates understanding of these issues and agrees with the plan.      Maribeth Ardon MD  [facility]

## 2017-08-26 ENCOUNTER — NURSE TRIAGE (OUTPATIENT)
Dept: NURSING | Facility: CLINIC | Age: 62
End: 2017-08-26

## 2017-08-26 NOTE — TELEPHONE ENCOUNTER
"  Reason for Disposition    Swollen ankle joint (all triage questions negative)  (Exception: area of localized swelling which is itchy)    Additional Information    Negative: Chest pain    Negative: Followed an ankle injury    Negative: Ankle pain is main symptom    Negative: Swelling of both ankles (i.e., pedal edema)    Negative: Swelling of calf or leg    Negative: Difficulty breathing    Negative: Entire foot is cool or blue in comparison to other side    Negative: Fever    Negative: Patient sounds very sick or weak to the triager    Negative: [1] SEVERE pain (e.g., excruciating, unable to walk) AND [2] not improved after 2 hours of pain medicine    Negative: [1] Can't move swollen joint at all AND [2] no fever    Negative: [1] Redness AND [2] painful when touched AND [3] no fever    Negative: [1] Red area or streak [2] large (> 2 in. or 5 cm)    Negative: [1] Thigh or calf pain AND [2] only 1 side AND [3] present > 1 hour    Negative: [1] Thigh, calf, or ankle swelling AND [2] only 1 side    Negative: [1] Thigh, calf, or ankle swelling AND [2] bilateral AND [3] 1 side is more swollen    Negative: [1] Very swollen joint AND [2] no fever    Negative: Looks like a boil, infected sore, deep ulcer or other infected rash (spreading redness, pus)    Negative: [1] MODERATE pain (e.g., interferes with normal activities, limping) AND [2] present > 3 days    Answer Assessment - Initial Assessment Questions  1. LOCATION: \"Which joint is swollen?\"      Feet/ankles  2. ONSET: \"When did the swelling start?\"      tonight  3. SIZE: \"How large is the swelling?\"      swollen  4. PAIN: \"Is there any pain?\" If so, ask: \"How bad is it?\" (Scale 1-10; or mild, moderate, severe)      yes  5. CAUSE: \"What do you think caused the swollen joint?\"      Not sure  6. OTHER SYMPTOMS: \"Do you have any other symptoms?\" (e.g., fever, chest pain, difficulty breathing, calf pain)      no  7. PREGNANCY: \"Is there any chance you are pregnant?\" \"When " "was your last menstrual period?\"      no    Protocols used: ANKLE SWELLING-ADULT-AH    "

## 2017-08-30 ENCOUNTER — OFFICE VISIT (OUTPATIENT)
Dept: FAMILY MEDICINE | Facility: CLINIC | Age: 62
End: 2017-08-30
Payer: COMMERCIAL

## 2017-08-30 VITALS
HEIGHT: 67 IN | OXYGEN SATURATION: 95 % | SYSTOLIC BLOOD PRESSURE: 119 MMHG | WEIGHT: 251.2 LBS | BODY MASS INDEX: 39.43 KG/M2 | HEART RATE: 95 BPM | TEMPERATURE: 98.4 F | DIASTOLIC BLOOD PRESSURE: 80 MMHG

## 2017-08-30 DIAGNOSIS — R60.0 PERIPHERAL EDEMA: Primary | ICD-10-CM

## 2017-08-30 DIAGNOSIS — E85.89 OTHER AMYLOIDOSIS (H): ICD-10-CM

## 2017-08-30 DIAGNOSIS — E11.69 TYPE 2 DIABETES MELLITUS WITH OTHER SPECIFIED COMPLICATION (H): ICD-10-CM

## 2017-08-30 DIAGNOSIS — L21.9 SEBORRHEIC DERMATITIS: ICD-10-CM

## 2017-08-30 LAB
HBA1C MFR BLD: 7.3 % (ref 4.3–6)
HGB BLD-MCNC: 10.6 G/DL (ref 13.3–17.7)

## 2017-08-30 PROCEDURE — 83036 HEMOGLOBIN GLYCOSYLATED A1C: CPT | Performed by: FAMILY MEDICINE

## 2017-08-30 PROCEDURE — 36415 COLL VENOUS BLD VENIPUNCTURE: CPT | Performed by: FAMILY MEDICINE

## 2017-08-30 PROCEDURE — 85018 HEMOGLOBIN: CPT | Performed by: FAMILY MEDICINE

## 2017-08-30 PROCEDURE — 80048 BASIC METABOLIC PNL TOTAL CA: CPT | Performed by: FAMILY MEDICINE

## 2017-08-30 PROCEDURE — 99214 OFFICE O/P EST MOD 30 MIN: CPT | Performed by: FAMILY MEDICINE

## 2017-08-30 RX ORDER — POTASSIUM CHLORIDE 750 MG/1
10 TABLET, EXTENDED RELEASE ORAL DAILY
Qty: 30 TABLET | Refills: 1 | Status: SHIPPED | OUTPATIENT
Start: 2017-08-30 | End: 2017-09-13

## 2017-08-30 RX ORDER — FUROSEMIDE 20 MG
20 TABLET ORAL 2 TIMES DAILY
Qty: 60 TABLET | Refills: 1 | Status: SHIPPED | OUTPATIENT
Start: 2017-08-30 | End: 2017-09-13

## 2017-08-30 RX ORDER — TRIAMCINOLONE ACETONIDE 1 MG/G
CREAM TOPICAL
Qty: 15 G | Refills: 0 | Status: SHIPPED | OUTPATIENT
Start: 2017-08-30 | End: 2017-09-29

## 2017-08-30 NOTE — MR AVS SNAPSHOT
"              After Visit Summary   2017    Parmjit Hernández    MRN: [id number]           Patient Information     Date Of Birth          1955        Visit Information        Provider Department      2017 1:00 PM Maribeth Ardon MD [facility]        Today's Diagnoses     Peripheral edema    -  1    Other amyloidosis (H)        Type 2 diabetes mellitus with other specified complication (H)        Seborrheic dermatitis          Care Instructions    Peripheral edema- unlikely to be cardiac  will start on lasix 20 mg once daily for now  Its written for twice daily- but for next 2 weeks- use only once daily  Venous doppler to rule out deep venous thrombosis  Echocardiogram    please call [phone] to make appointment       [facility] CARDIOLOGY SCHEDULIN476) [phone]                       Follow-ups after your visit        Additional Services     LYMPHEDEMA THERAPY REFERRAL       *This therapy referral will be filtered to a centralized scheduling office at [facility] and the patient will receive a call to schedule an appointment at a [place] location most convenient for them. *   If you have not heard from the scheduling office within 2 business days, please call [phone] for all locations, with the exception of [place], please call [phone].     Treatment: PT or OT Evaluation & Treatment  Special Instructions:   PT/OT Treatment Diagnosis: Edema    Please be aware that coverage of these services is subject to the terms and limitations of your health insurance plan.  Call member services at your health plan with any benefit or coverage questions.      **Note to Provider:  If you are referring outside of [place] for the therapy appointment, please list the name of the location in the \"special instructions\" above, print the referral and give to the patient to schedule the appointment.                  Future tests that were ordered for you today     Open Future " "Orders        Priority Expected Expires Ordered    US Lower Extremity Venous Duplex Bilateral Routine  8/30/2018 8/30/2017    Echocardiogram Complete Routine  8/30/2018 8/30/2017            Who to contact     If you have questions or need follow up information about today's clinic visit or your schedule please contact [facility] directly at [phone].  Normal or non-critical lab and imaging results will be communicated to you by MyChart, letter or phone within 4 business days after the clinic has received the results. If you do not hear from us within 7 days, please contact the clinic through ActivNetworkshart or phone. If you have a critical or abnormal lab result, we will notify you by phone as soon as possible.  Submit refill requests through Hakia or call your pharmacy and they will forward the refill request to us. Please allow 3 business days for your refill to be completed.          Additional Information About Your Visit        ActivNetworksharLayerGloss Information     Hakia gives you secure access to your electronic health record. If you see a primary care provider, you can also send messages to your care team and make appointments. If you have questions, please call your primary care clinic.  If you do not have a primary care provider, please call [phone] and they will assist you.        Care EveryWhere ID     This is your Care EveryWhere ID. This could be used by other organizations to access your [place] medical records  [id number]        Your Vitals Were     Pulse Temperature Height Pulse Oximetry BMI (Body Mass Index)       95 98.4  F (36.9  C) (Oral) 5' 7\" (1.702 m) 95% 39.34 kg/m2        Blood Pressure from Last 3 Encounters:   08/30/17 119/80   08/01/17 128/80   06/28/17 138/80    Weight from Last 3 Encounters:   08/30/17 251 lb 3.2 oz (113.9 kg)   08/01/17 222 lb (100.7 kg)   06/28/17 239 lb 1.6 oz (108.5 kg)              We Performed the Following     Basic metabolic panel     Hemoglobin A1c     " Hemoglobin     LYMPHEDEMA THERAPY REFERRAL          Today's Medication Changes          These changes are accurate as of: 8/30/17  1:49 PM.  If you have any questions, ask your nurse or doctor.               Start taking these medicines.        Dose/Directions    furosemide 20 MG tablet   Commonly known as:  LASIX   Used for:  Peripheral edema   Started by:  Maribeth Ardon MD        Dose:  20 mg   Take 1 tablet (20 mg) by mouth 2 times daily   Quantity:  60 tablet   Refills:  1       potassium chloride SA 10 MEQ CR tablet   Commonly known as:  K-DUR/KLOR-CON M   Used for:  Peripheral edema   Started by:  Maribeth Ardon MD        Dose:  10 mEq   Take 1 tablet (10 mEq) by mouth daily   Quantity:  30 tablet   Refills:  1       triamcinolone 0.1 % cream   Commonly known as:  KENALOG   Used for:  Seborrheic dermatitis   Started by:  Maribeth Ardon MD        Apply sparingly to affected area three times daily for 14 days.   Quantity:  15 g   Refills:  0         These medicines have changed or have updated prescriptions.        Dose/Directions    * order for DME   This may have changed:  Another medication with the same name was added. Make sure you understand how and when to take each.   Used for:  Obstructive sleep apnea   Changed by:  Spike Tobin MD        Equipment being ordered: CPAP   Quantity:  1 Device   Refills:  0       * order for DME   This may have changed:  You were already taking a medication with the same name, and this prescription was added. Make sure you understand how and when to take each.   Used for:  Peripheral edema   Changed by:  Maribeth Ardon MD        Equipment being ordered: sharla hose   Quantity:  2 Box   Refills:  1       * Notice:  This list has 2 medication(s) that are the same as other medications prescribed for you. Read the directions carefully, and ask your doctor or other care provider to review them with you.         Where to get your medicines      These  medications were sent to [id number] - [address] AT [address] & [facility]  [address] [place] [id number]     Phone:  [phone]     furosemide 20 MG tablet    potassium chloride SA 10 MEQ CR tablet    triamcinolone 0.1 % cream         Some of these will need a paper prescription and others can be bought over the counter.  Ask your nurse if you have questions.     Bring a paper prescription for each of these medications     order for DME                Primary Care Provider Office Phone # Fax #    Maribeth Ardon [phone] [address] [facility] [address]        Equal Access to Services     JUSTYNA FRIEDMAN : Lyle Blevins, waaxda kari, qaybta kaalmada joselyn, glenis williamson. So [facility] [phone].    ATENCIÓN: Si habla español, tiene a agudelo disposición servicios gratuitos de asistencia lingüística. Llame al [phone].    We comply with applicable federal civil rights laws and [place] laws. We do not discriminate on the basis of race, color, national origin, age, disability sex, sexual orientation or gender identity.            Thank you!     Thank you for choosing [facility]  for your care. Our goal is always to provide you with excellent care. Hearing back from our patients is one way we can continue to improve our services. Please take a few minutes to complete the written survey that you may receive in the mail after your visit with us. Thank you!             Your Updated Medication List - Protect others around you: Learn how to safely use, store and throw away your medicines at www.disposemymeds.org.          This list is accurate as of: 8/30/17  1:49 PM.  Always use your most recent med list.                   Brand Name Dispense Instructions for use Diagnosis    atorvastatin 10 MG tablet    LIPITOR    90 tablet    Take 1 tablet (10 mg) by mouth daily     Hyperlipidemia LDL goal <100       divalproex 500 MG EC tablet    DEPAKOTE    10 tablet    Take 2 tablets (1,000 mg) by mouth daily    Hyperlipidemia LDL goal <100       doxepin 25 MG capsule    SINEquan    10 capsule    Take 2 capsules (50 mg) by mouth At Bedtime    Hyperlipidemia LDL goal <100       furosemide 20 MG tablet    LASIX    60 tablet    Take 1 tablet (20 mg) by mouth 2 times daily    Peripheral edema       gabapentin 800 MG tablet    NEURONTIN    90 tablet    Take 1 tablet (800 mg) by mouth 3 times daily    Chronic abdominal pain       lisinopril 10 MG tablet    PRINIVIL/ZESTRIL    30 tablet    Take 1 tablet (10 mg) by mouth daily    Hypertension goal BP (blood pressure) < 140/90       OLANZapine 7.5 MG tablet    zyPREXA    5 tablet    Take 1 tablet (7.5 mg) by mouth At Bedtime    Hyperlipidemia LDL goal <100, Generalized edema, Chronic abdominal pain       * order for DME     1 Device    Equipment being ordered: CPAP    Obstructive sleep apnea       * order for DME     2 Box    Equipment being ordered: sharla hose    Peripheral edema       potassium chloride SA 10 MEQ CR tablet    K-DUR/KLOR-CON M    30 tablet    Take 1 tablet (10 mEq) by mouth daily    Peripheral edema       pramipexole 0.125 MG tablet    MIRAPEX    30 tablet    Take 1 tablet (0.125 mg) by mouth At Bedtime    Restless legs syndrome (RLS)       SUMAtriptan 5 MG/ACT nasal spray    IMITREX    1 each    Spray 1-2 sprays in nostril as needed for migraine May repeat in 2 hours. Max 8 sprays/24 hours.    Abdominal migraine, intractable       triamcinolone 0.1 % cream    KENALOG    15 g    Apply sparingly to affected area three times daily for 14 days.    Seborrheic dermatitis       * Notice:  This list has 2 medication(s) that are the same as other medications prescribed for you. Read the directions carefully, and ask your doctor or other care provider to review them with you.

## 2017-08-30 NOTE — PATIENT INSTRUCTIONS
Peripheral edema- unlikely to be cardiac  will start on lasix 20 mg once daily for now  Its written for twice daily- but for next 2 weeks- use only once daily  Venous doppler to rule out deep venous thrombosis  Echocardiogram    please call [phone] to make appointment       [facility] CARDIOLOGY SCHEDULIN920) [phone]

## 2017-08-30 NOTE — PROGRESS NOTES
SUBJECTIVE:   Parmjit Hernádnez is a 62 year old male who presents to clinic today for the following health issues:    Aggravating factors include: standing, walking, climbing stairs and exercise    Therapies tried and outcome: stretching - helps but is temporary and chiropractor - did not help symptoms     Patient is also here to follow up on edema and neuropathy. Patient also requesting anemia blood testing, dr yelitza DENSON.     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    Unlikely to be of cardiac in origin but he has poor excercise tolerance and that makes it hard to distingusih between cardiac and noncardiac  I recommend to have an Echocardiogram Complete,  Also  US Lower Extremity Venous Duplex Bilateral, continue with furosemide (LASIX) 20  MG tablet,bid  potassium chloride SA (K-DUR/KLOR-CON M) 10 MEQ CR tablet, Basic         metabolic panel,  Proceed with  LYMPHEDEMA THERAPY REFERRAL,         order for DME      (E85.8) Other amyloidosis (H)  Plan: Hemoglobin  Under care of Dr Cheatham    (E11.69) Type 2 diabetes mellitus with other specified complication (H)  Plan: Hemoglobin A1c,controlled on diet  There is a definite fluctuation of A1C with his anemia  Lab Results   Component Value Date    A1C 7.3 08/30/2017    A1C 4.8 05/03/2017    A1C 7.0 10/04/2016    A1C 6.0 07/14/2016    A1C 7.4 02/12/2016         (L21.9) Seborrheic dermatitis  Comment: face- he thinks its form cpap mask  Plan: triamcinolone (KENALOG) 0.1 % cream  Twice daily for 2 weeks or less- stop the cream if no improvement    We ran out of time for lower back pain  "and he is advised to make a follow up appointment to discuss it in detail  He is well aware and aggree that narcotic are not an option of treatment for that. Willing to consider PHYSICAL THERAPY  Reports lower back pain is managable for now    The patient indicates understanding of these issues and agrees with the plan.          Chief Complaint   Patient presents with     Edema     NEUROPATHY     Back Pain         Current Outpatient Prescriptions   Medication     furosemide (LASIX) 20 MG tablet     potassium chloride SA (K-DUR/KLOR-CON M) 10 MEQ CR tablet     triamcinolone (KENALOG) 0.1 % cream     order for DME     gabapentin (NEURONTIN) 800 MG tablet     pramipexole (MIRAPEX) 0.125 MG tablet     SUMAtriptan (IMITREX) 5 MG/ACT nasal spray     lisinopril (PRINIVIL/ZESTRIL) 10 MG tablet     atorvastatin (LIPITOR) 10 MG tablet     divalproex (DEPAKOTE) 500 MG EC tablet     doxepin (SINEQUAN) 25 MG capsule     OLANZapine (ZYPREXA) 7.5 MG tablet     order for DME     No current facility-administered medications for this visit.        Past medical and family history,medications, allergies and problem list reviewed       ROS: 10 point ROS neg other than the symptoms noted above in the HPI.    EXAM:/80  Pulse 95  Temp 98.4  F (36.9  C) (Oral)  Ht 5' 7\" (1.702 m)  Wt 251 lb 3.2 oz (113.9 kg)  SpO2 95%  BMI 39.34 kg/m2  Constitutional: healthy, alert and no distress   Head: Normocephalic. No masses, lesions, tenderness or abnormalities  Eyes; PERRLA EOMI  Neck: Neck supple. No adenopathy. Thyroid symmetric, normal size  Ear,Nose, clear . Throat: normal, oropharynx  Cardiovascular: negative,  RRR. No murmurs, clicks gallops or rub  Respiratory:  Lungs clear.no wheezing, crackles.  Abdomen: Abdomen soft, non-tender.no organomegaly, No CVA tenderness.  NEURO: Gait normal. Reflexes normal and symmetric. Sensation grossly WNL.  Psychiatric: mentation appears normal and affect normal/bright            Potential " medication side effects were discussed with the patient; let me know if any occur.    The patient indicates understanding of these issues and agrees with the plan.      Maribeth Ardon

## 2017-08-30 NOTE — NURSING NOTE
"Chief Complaint   Patient presents with     Edema     NEUROPATHY     Back Pain       Initial /80  Pulse 95  Temp 98.4  F (36.9  C) (Oral)  Ht 5' 7\" (1.702 m)  Wt 251 lb 3.2 oz (113.9 kg)  SpO2 95%  BMI 39.34 kg/m2 Estimated body mass index is 39.34 kg/(m^2) as calculated from the following:    Height as of this encounter: 5' 7\" (1.702 m).    Weight as of this encounter: 251 lb 3.2 oz (113.9 kg).  Medication Reconciliation: complete      Health Maintenance that is potentially due pending provider review:  NONE    n/a    BAR Mcfarland  "

## 2017-08-31 LAB
ANION GAP SERPL CALCULATED.3IONS-SCNC: 9 MMOL/L (ref 3–14)
BUN SERPL-MCNC: 19 MG/DL (ref 7–30)
CALCIUM SERPL-MCNC: 8.6 MG/DL (ref 8.5–10.1)
CHLORIDE SERPL-SCNC: 104 MMOL/L (ref 94–109)
CO2 SERPL-SCNC: 23 MMOL/L (ref 20–32)
CREAT SERPL-MCNC: 0.83 MG/DL (ref 0.66–1.25)
GFR SERPL CREATININE-BSD FRML MDRD: >90 ML/MIN/1.7M2
GLUCOSE SERPL-MCNC: 217 MG/DL (ref 70–99)
POTASSIUM SERPL-SCNC: 4.4 MMOL/L (ref 3.4–5.3)
SODIUM SERPL-SCNC: 136 MMOL/L (ref 133–144)

## 2017-09-05 ENCOUNTER — TRANSFERRED RECORDS (OUTPATIENT)
Dept: HEALTH INFORMATION MANAGEMENT | Facility: CLINIC | Age: 62
End: 2017-09-05

## 2017-09-06 ENCOUNTER — HOSPITAL ENCOUNTER (OUTPATIENT)
Dept: CARDIOLOGY | Facility: CLINIC | Age: 62
Discharge: HOME | End: 2017-09-06
Attending: FAMILY MEDICINE | Admitting: FAMILY MEDICINE
Payer: COMMERCIAL

## 2017-09-06 DIAGNOSIS — E11.69 TYPE 2 DIABETES MELLITUS WITH OTHER SPECIFIED COMPLICATION (H): ICD-10-CM

## 2017-09-06 DIAGNOSIS — R60.0 PERIPHERAL EDEMA: ICD-10-CM

## 2017-09-06 PROCEDURE — 40000264 ECHO COMPLETE WITH LUMASON

## 2017-09-06 PROCEDURE — 25500064 ZZH RX 255 OP 636: Performed by: FAMILY MEDICINE

## 2017-09-06 PROCEDURE — 93306 TTE W/DOPPLER COMPLETE: CPT | Mod: 26 | Performed by: INTERNAL MEDICINE

## 2017-09-06 RX ADMIN — SULFUR HEXAFLUORIDE 5 ML: KIT at 16:11

## 2017-09-07 ENCOUNTER — RADIANT APPOINTMENT (OUTPATIENT)
Dept: ULTRASOUND IMAGING | Facility: CLINIC | Age: 62
End: 2017-09-07
Attending: FAMILY MEDICINE
Payer: COMMERCIAL

## 2017-09-07 DIAGNOSIS — R60.0 PERIPHERAL EDEMA: ICD-10-CM

## 2017-09-07 PROCEDURE — 93970 EXTREMITY STUDY: CPT | Performed by: RADIOLOGY

## 2017-09-08 NOTE — PROGRESS NOTES
No deep venous thrombosis in legs and that's good    I did speak briefly with Dr Fonseca Nurse- and if you have more fatigue then call that office- but otherwise it seems that hgb of 10.6 is acceptable for you- and UNLESS MORE FATIGUE-    we will get most recent office notes from  July faxed to us at [facility] that' HELPS ME TO READ HIS ASSESSMENT AND PLAN     Please keep us posted with questions or concerns .      Best Regards,    Maribeth Ardon MD  [facility]  [phone]

## 2017-09-11 NOTE — PROGRESS NOTES
Normal echo- that's good  Mildly dilated aorta- will check it periodically- no further action required at this time

## 2017-09-13 ENCOUNTER — OFFICE VISIT (OUTPATIENT)
Dept: FAMILY MEDICINE | Facility: CLINIC | Age: 62
End: 2017-09-13
Payer: COMMERCIAL

## 2017-09-13 ENCOUNTER — MYC MEDICAL ADVICE (OUTPATIENT)
Dept: FAMILY MEDICINE | Facility: CLINIC | Age: 62
End: 2017-09-13

## 2017-09-13 VITALS
OXYGEN SATURATION: 100 % | WEIGHT: 244.9 LBS | SYSTOLIC BLOOD PRESSURE: 109 MMHG | TEMPERATURE: 97 F | HEART RATE: 92 BPM | DIASTOLIC BLOOD PRESSURE: 80 MMHG | BODY MASS INDEX: 38.44 KG/M2 | HEIGHT: 67 IN

## 2017-09-13 DIAGNOSIS — G47.33 OBSTRUCTIVE SLEEP APNEA SYNDROME: Primary | ICD-10-CM

## 2017-09-13 DIAGNOSIS — G25.81 RESTLESS LEGS SYNDROME (RLS): ICD-10-CM

## 2017-09-13 DIAGNOSIS — R40.0 DAYTIME SOMNOLENCE: ICD-10-CM

## 2017-09-13 DIAGNOSIS — R10.9 CHRONIC ABDOMINAL PAIN: ICD-10-CM

## 2017-09-13 DIAGNOSIS — I10 HYPERTENSION GOAL BP (BLOOD PRESSURE) < 140/90: ICD-10-CM

## 2017-09-13 DIAGNOSIS — R60.0 PERIPHERAL EDEMA: ICD-10-CM

## 2017-09-13 DIAGNOSIS — E11.9 TYPE 2 DIABETES MELLITUS WITHOUT COMPLICATION, WITHOUT LONG-TERM CURRENT USE OF INSULIN (H): ICD-10-CM

## 2017-09-13 DIAGNOSIS — E85.89 OTHER AMYLOIDOSIS (H): ICD-10-CM

## 2017-09-13 DIAGNOSIS — G89.29 CHRONIC ABDOMINAL PAIN: ICD-10-CM

## 2017-09-13 DIAGNOSIS — F31.9 BIPOLAR I DISORDER (H): ICD-10-CM

## 2017-09-13 PROBLEM — F42.8 OTHER OBSESSIVE-COMPULSIVE DISORDERS: Status: ACTIVE | Noted: 2017-09-13

## 2017-09-13 LAB
ANION GAP SERPL CALCULATED.3IONS-SCNC: 7 MMOL/L (ref 3–14)
BASOPHILS # BLD AUTO: 0.1 10E9/L (ref 0–0.2)
BASOPHILS NFR BLD AUTO: 1.3 %
BUN SERPL-MCNC: 33 MG/DL (ref 7–30)
CALCIUM SERPL-MCNC: 8.5 MG/DL (ref 8.5–10.1)
CHLORIDE SERPL-SCNC: 104 MMOL/L (ref 94–109)
CO2 SERPL-SCNC: 25 MMOL/L (ref 20–32)
CREAT SERPL-MCNC: 1.22 MG/DL (ref 0.66–1.25)
DIFFERENTIAL METHOD BLD: ABNORMAL
EOSINOPHIL # BLD AUTO: 0.4 10E9/L (ref 0–0.7)
EOSINOPHIL NFR BLD AUTO: 6.7 %
ERYTHROCYTE [DISTWIDTH] IN BLOOD BY AUTOMATED COUNT: 15.4 % (ref 10–15)
GFR SERPL CREATININE-BSD FRML MDRD: 60 ML/MIN/1.7M2
GLUCOSE SERPL-MCNC: 295 MG/DL (ref 70–99)
HCT VFR BLD AUTO: 32 % (ref 40–53)
HGB BLD-MCNC: 11 G/DL (ref 13.3–17.7)
LYMPHOCYTES # BLD AUTO: 0.9 10E9/L (ref 0.8–5.3)
LYMPHOCYTES NFR BLD AUTO: 15.3 %
MCH RBC QN AUTO: 33.4 PG (ref 26.5–33)
MCHC RBC AUTO-ENTMCNC: 34.4 G/DL (ref 31.5–36.5)
MCV RBC AUTO: 97 FL (ref 78–100)
MONOCYTES # BLD AUTO: 0.6 10E9/L (ref 0–1.3)
MONOCYTES NFR BLD AUTO: 11.2 %
NEUTROPHILS # BLD AUTO: 3.7 10E9/L (ref 1.6–8.3)
NEUTROPHILS NFR BLD AUTO: 65.5 %
PLATELET # BLD AUTO: 124 10E9/L (ref 150–450)
POTASSIUM SERPL-SCNC: 4.7 MMOL/L (ref 3.4–5.3)
RBC # BLD AUTO: 3.29 10E12/L (ref 4.4–5.9)
SODIUM SERPL-SCNC: 136 MMOL/L (ref 133–144)
WBC # BLD AUTO: 5.6 10E9/L (ref 4–11)

## 2017-09-13 PROCEDURE — 85025 COMPLETE CBC W/AUTO DIFF WBC: CPT | Performed by: FAMILY MEDICINE

## 2017-09-13 PROCEDURE — 80048 BASIC METABOLIC PNL TOTAL CA: CPT | Performed by: FAMILY MEDICINE

## 2017-09-13 PROCEDURE — 99214 OFFICE O/P EST MOD 30 MIN: CPT | Performed by: FAMILY MEDICINE

## 2017-09-13 PROCEDURE — 36415 COLL VENOUS BLD VENIPUNCTURE: CPT | Performed by: FAMILY MEDICINE

## 2017-09-13 RX ORDER — PRAMIPEXOLE DIHYDROCHLORIDE 0.12 MG/1
0.12 TABLET ORAL AT BEDTIME
Qty: 30 TABLET | Refills: 1 | Status: SHIPPED | OUTPATIENT
Start: 2017-09-13 | End: 2017-11-10

## 2017-09-13 RX ORDER — GABAPENTIN 800 MG/1
800 TABLET ORAL 3 TIMES DAILY
Qty: 90 TABLET | Refills: 11 | Status: SHIPPED | OUTPATIENT
Start: 2017-09-13 | End: 2018-12-21

## 2017-09-13 RX ORDER — POTASSIUM CHLORIDE 750 MG/1
10 TABLET, EXTENDED RELEASE ORAL DAILY
Qty: 90 TABLET | Refills: 1 | Status: SHIPPED | OUTPATIENT
Start: 2017-09-13 | End: 2018-01-18

## 2017-09-13 RX ORDER — FUROSEMIDE 20 MG
20 TABLET ORAL 2 TIMES DAILY
Qty: 90 TABLET | Refills: 1 | Status: ON HOLD | OUTPATIENT
Start: 2017-09-13 | End: 2018-01-25

## 2017-09-13 NOTE — MR AVS SNAPSHOT
After Visit Summary   9/13/2017    Parmjit Hernández    MRN: [id number]           Patient Information     Date Of Birth          1955        Visit Information        Provider Department      9/13/2017 8:45 AM Maribeth Ardon MD [facility]        Today's Diagnoses     Obstructive sleep apnea syndrome    -  1    Type 2 diabetes mellitus without complication, without long-term current use of insulin (H)        Other amyloidosis (H)        Bipolar I disorder (H)        Hypertension goal BP (blood pressure) < 140/90        Peripheral edema        Daytime somnolence        Restless legs syndrome (RLS)        Chronic abdominal pain           Follow-ups after your visit        Additional Services     SLEEP EVALUATION & MANAGEMENT REFERRAL - ADULT       Please be aware that coverage of these services is subject to the terms and limitations of your health insurance plan.  Call member services at your health plan with any benefit or coverage questions.      Please bring the following to your appointment:    >>   List of current medications   >>   This referral request   >>   Any documents/labs given to you for this referral    [facility] [phone] (Age 18 and up)                  Your next 10 appointments already scheduled     Sep 14, 2017 11:30 AM CDT   Office Visit with [facility] ([facility])    [address] [place]  [address] [place] [address] [phone]           Bring a current list of meds and any records pertaining to this visit. For Physicals, please bring immunization records and any forms needing to be filled out. Please arrive 10 minutes early to complete paperwork.              Future tests that were ordered for you today     Open Future Orders        Priority Expected Expires Ordered    SLEEP EVALUATION & MANAGEMENT REFERRAL - ADULT Routine  9/13/2018 9/13/2017            Who to contact  "    If you have questions or need follow up information about today's clinic visit or your schedule please contact [facility] directly at [phone].  Normal or non-critical lab and imaging results will be communicated to you by MyChart, letter or phone within 4 business days after the clinic has received the results. If you do not hear from us within 7 days, please contact the clinic through Broadband Voicehart or phone. If you have a critical or abnormal lab result, we will notify you by phone as soon as possible.  Submit refill requests through HomeZada or call your pharmacy and they will forward the refill request to us. Please allow 3 business days for your refill to be completed.          Additional Information About Your Visit        HomeZada Information     HomeZada gives you secure access to your electronic health record. If you see a primary care provider, you can also send messages to your care team and make appointments. If you have questions, please call your primary care clinic.  If you do not have a primary care provider, please call [phone] and they will assist you.        Care EveryWhere ID     This is your Care EveryWhere ID. This could be used by other organizations to access your [place] medical records  [id number]        Your Vitals Were     Pulse Temperature Height Pulse Oximetry BMI (Body Mass Index)       92 97  F (36.1  C) (Oral) 5' 7\" (1.702 m) 100% 38.36 kg/m2        Blood Pressure from Last 3 Encounters:   09/13/17 109/80   08/30/17 119/80   08/01/17 128/80    Weight from Last 3 Encounters:   09/13/17 244 lb 14.4 oz (111.1 kg)   08/30/17 251 lb 3.2 oz (113.9 kg)   08/01/17 222 lb (100.7 kg)              We Performed the Following     Basic metabolic panel     CBC with platelets differential          Where to get your medicines      These medications were sent to [id number] - [address] [facility] [address] [facility] [address]     Phone:  [phone]     furosemide 20 MG tablet    gabapentin 800 MG tablet    potassium chloride SA 10 MEQ CR tablet    pramipexole 0.125 MG tablet          Primary Care Provider Office Phone # Fax #    Maribeth Mary Ardon [phone] [address] [facility] [address]        Equal Access to Services     [facility]: Hadii aad ku hadasho Soomaali, waaxda luqadaha, qaybta kaalmada adeegyada, waxay idiin hayaan adeeg kalpana laLeonaan . So [facility] [phone].    ATENCIÓN: Si habla español, tiene a agudelo disposición servicios gratuitos de asistencia lingüística. [facility] [phone].    We comply with applicable federal civil rights laws and [place] laws. We do not discriminate on the basis of race, color, national origin, age, disability sex, sexual orientation or gender identity.            Thank you!     Thank you for choosing [facility]  for your care. Our goal is always to provide you with excellent care. Hearing back from our patients is one way we can continue to improve our services. Please take a few minutes to complete the written survey that you may receive in the mail after your visit with us. Thank you!             Your Updated Medication List - Protect others around you: Learn how to safely use, store and throw away your medicines at www.disposemymeds.org.          This list is accurate as of: 9/13/17  9:26 AM.  Always use your most recent med list.                   Brand Name Dispense Instructions for use Diagnosis    atorvastatin 10 MG tablet    LIPITOR    90 tablet    Take 1 tablet (10 mg) by mouth daily    Hyperlipidemia LDL goal <100       divalproex 500 MG EC tablet    DEPAKOTE    10 tablet    Take 2 tablets (1,000 mg) by mouth daily    Hyperlipidemia LDL goal <100       doxepin 25 MG capsule    SINEquan    10 capsule    Take 2 capsules (50 mg) by mouth At Bedtime    Hyperlipidemia LDL goal <100       furosemide 20 MG tablet     LASIX    90 tablet    Take 1 tablet (20 mg) by mouth 2 times daily    Peripheral edema       gabapentin 800 MG tablet    NEURONTIN    90 tablet    Take 1 tablet (800 mg) by mouth 3 times daily    Chronic abdominal pain       lisinopril 10 MG tablet    PRINIVIL/ZESTRIL    30 tablet    Take 1 tablet (10 mg) by mouth daily    Hypertension goal BP (blood pressure) < 140/90       metFORMIN 500 MG 24 hr tablet    GLUCOPHAGE-XR    180 tablet    Take 2 tablets (1,000 mg) by mouth daily (with dinner)    Type 2 diabetes mellitus without complication, without long-term current use of insulin (H)       OLANZapine 7.5 MG tablet    zyPREXA    5 tablet    Take 1 tablet (7.5 mg) by mouth At Bedtime    Hyperlipidemia LDL goal <100, Generalized edema, Chronic abdominal pain       * order for DME     1 Device    Equipment being ordered: CPAP    Obstructive sleep apnea       * order for DME     2 Box    Equipment being ordered: sharla hose    Peripheral edema       potassium chloride SA 10 MEQ CR tablet    K-DUR/KLOR-CON M    90 tablet    Take 1 tablet (10 mEq) by mouth daily    Peripheral edema       pramipexole 0.125 MG tablet    MIRAPEX    30 tablet    Take 1 tablet (0.125 mg) by mouth At Bedtime    Restless legs syndrome (RLS)       SUMAtriptan 5 MG/ACT nasal spray    IMITREX    1 each    Spray 1-2 sprays in nostril as needed for migraine May repeat in 2 hours. Max 8 sprays/24 hours.    Abdominal migraine, intractable       triamcinolone 0.1 % cream    KENALOG    15 g    Apply sparingly to affected area three times daily for 14 days.    Seborrheic dermatitis       * Notice:  This list has 2 medication(s) that are the same as other medications prescribed for you. Read the directions carefully, and ask your doctor or other care provider to review them with you.

## 2017-09-13 NOTE — PROGRESS NOTES
ASSESSMENT/PLAN:   (G47.33) Obstructive sleep apnea syndrome  (primary encounter diagnosis)  (R40.0) Daytime somnolence  Plan: sleep study 10 yrs ago- and using same pressure setting and now having more daytime sleepiness    I recommend detail consul ation with sleep specialist to determine if CPAP pressures OK, machine calibrated  And also if needs to be on medications for restless leg syndrome- at all  His current symptoms of fatigue- are most likely related to daytime sleepiness, due to sleep apnea  SLEEP EVALUATION & MANAGEMENT REFERRAL - ADULT         (G25.81) Restless legs syndrome (RLS)  Plan: pramipexole (MIRAPEX) 0.125 MG tablet  SLEEP EVALUATION & MANAGEMENT REFERRAL - ADULT  And if no signs of  Restless leg syndrome - then should stop pirapex    (R60.9) Peripheral edema  Plan: Noncardiac  Continue with  furosemide (LASIX) 20 MG tablet,   potassium       chloride SA (K-DUR/KLOR-CON M) 10 MEQ CR  Tab once daily   Basic metabolic panel  Reports his swellijng in legs gown down and edema is better with frusemide     (E11.9) Type 2 diabetes mellitus without complication, without long-term current use of insulin (H)  Plan: Continues with Glucophage 2000 mg once daily   MTM consultation- to help with SMBG- to get a better evaluation of the Diabetes  constrol  Does not watch diet      (E85.8) Other amyloidosis (H)  Plan: CBC with platelets differential  Under care of Dr Fonseca- appointment tomorrow  Will discuss the hgb/anemai as well  We checked iron/ferritin- both normal in june 2017      Peripheral neuropathy  Plan: gabapentin (NEURONTIN) 800 MG tablet  Reports that ab pain has resolved in past 4 weeks        (F31.9) Bipolar I disorder (H)  Plan: Under care of Dr Noah Luna, olanzapine, doxepine    (I10) Hypertension goal BP (blood pressure) < 140/90  Plan:  Controlled on lisinopril 10 mg once daily           Potential medication side effects were discussed with the patient; let me know if any  occur.  The patient indicates understanding of these issues and agrees with the plan.      Maribeth Ardon MD  [facility]    SUBJECTIVE:   Parmjit Hernández is a 62 year old male who presents to clinic today for the following health issues:      Patient here to follow up on swollen feet and to discuss if he should continue with furosemide.   Currently taking 1 tab once daily   Feels that legs less swollen  Poor excercise tolerance, easily shortness of breath     He has an appointment with Dr Fonseca  Tomorrow  He has never been prescribed iron- and does not know what should be done about his on going anemia    Continues to have fatigue. Would like to consider getting iron tabs even if they have placebo affect  We discussed recently checked iron & ferritin in 6'2017- all within normal limits    Does use CPA for sleep apnea. Its been many years that the CPAP was calibrated and has not had any sleep study for years  He reports he wakes up multiple times during the night and takes doxepin-at about 8-9 pm, bedtime is 10-12 pm  It was prescribed by-Psychiatrist- Dr Jose F Rahman , he also prescribed olanzapine for mood, , depression / anxiety and also helps with sleep  And also prescribes Depakote partly to carter off migraine headache   If does not take it- he tosses and turns till morning      Reports that Imitrex is not helping with what he thought in the beginning as abdomen migraine-and it can be removed from  The list    He does take gabapentin three times daily - that helps with stinging/numbness in hands and feet, hands and upper body has resolved- and that it interfered with sleep and was a lot of sharp burning twining  and that's not the case any more      PROBLEMS TO ADD ON...    Problem list and histories reviewed & adjusted, as indicated.  Additional history: as documented    Labs reviewed in EPIC    Reviewed and updated as needed this visit by clinical staff  Allergies       Reviewed and updated  "as needed this visit by Provider         ROS:  CONSTITUTIONAL:POSITIVE  for fatigue  INTEGUMENTARY/SKIN: skin rash improved on triamcinolone- used for only 3-4 days and now has stopped it  EYES: NEGATIVE for vision changes or irritation  ENT/MOUTH: NEGATIVE for ear, mouth and throat problems  RESP:NEGATIVE for significant cough or SOB  CV: POSITIVE for lower extremity edema  GI: NEGATIVE for nausea, abdominal pain, heartburn, or change in bowel habits  MUSCULOSKELETAL: POSITIVE  for arthralgias knees and hips, nagi on getting in and out of the car  NEURO: NEGATIVE for weakness, dizziness or paresthesias    OBJECTIVE:     /80  Pulse 92  Temp 97  F (36.1  C) (Oral)  Ht 5' 7\" (1.702 m)  Wt 244 lb 14.4 oz (111.1 kg)  SpO2 100%  BMI 38.36 kg/m2  Body mass index is 38.36 kg/(m^2).  GENERAL: obese, fatigued and sleepy but easily arousable  NECK: no adenopathy, no asymmetry, masses, or scars and thyroid normal to palpation  RESP: lungs clear to auscultation - no rales, rhonchi or wheezes  CV: regular rate and rhythm, normal S1 S2, no S3 or S4, no murmur, click or rub, no peripheral edema and peripheral pulses strong  ABDOMEN: soft, nontender, no hepatosplenomegaly, no masses and bowel sounds normal  NEURO: Normal strength and tone, mentation intact and speech normal  PSYCH: mentation appears normal, affect normal/bright    Diagnostic Test Results:    "

## 2017-09-13 NOTE — NURSING NOTE
"Chief Complaint   Patient presents with     Follow Up     Swollen Feet       Initial /80  Pulse 92  Temp 97  F (36.1  C) (Oral)  Ht 5' 7\" (1.702 m)  Wt 244 lb 14.4 oz (111.1 kg)  SpO2 100%  BMI 38.36 kg/m2 Estimated body mass index is 38.36 kg/(m^2) as calculated from the following:    Height as of this encounter: 5' 7\" (1.702 m).    Weight as of this encounter: 244 lb 14.4 oz (111.1 kg).  Medication Reconciliation: complete      Health Maintenance that is potentially due pending provider review:  NONE    n/a    BAR Mcfarland  "

## 2017-09-15 NOTE — PROGRESS NOTES
Blood glucose is very high at 295  -Kidney function (GFR) is decreased.  ADVISE: recheck in a month   -Sodium is normal.  -Potassium is normal.      Hemoglobin in low at 11  Follow up with Dr Fonseca recommendation    Please keep us posted with questions or concerns .      Best Regards,    Maribeth Ardon MD  [facility]  [phone]

## 2017-09-16 DIAGNOSIS — I10 HYPERTENSION GOAL BP (BLOOD PRESSURE) < 140/90: ICD-10-CM

## 2017-09-18 RX ORDER — LISINOPRIL 10 MG/1
TABLET ORAL
Qty: 90 TABLET | Refills: 1 | Status: ON HOLD | OUTPATIENT
Start: 2017-09-18 | End: 2018-01-25

## 2017-09-18 NOTE — TELEPHONE ENCOUNTER
Prescription approved per [facility] Refill Protocol.  Stacie MAYA RN    Pending Prescriptions:                       Disp   Refills    lisinopril (PRINIVIL/ZESTRIL) 10 MG tablet*30 tab*0        Sig: TAKE 1 TABLET BY MOUTH DAILY        Last Written Prescription Date: 6/28/2017  Last Fill Quantity: 30, # refills: 1  Last Office Visit with [facility] or [facility] prescribing provider: 9/13/2017       Potassium   Date Value Ref Range Status   09/13/2017 4.7 3.4 - 5.3 mmol/L Final     Creatinine   Date Value Ref Range Status   09/13/2017 1.22 0.66 - 1.25 mg/dL Final     BP Readings from Last 3 Encounters:   09/13/17 109/80   08/30/17 119/80   08/01/17 128/80

## 2017-09-22 ENCOUNTER — OFFICE VISIT (OUTPATIENT)
Dept: FAMILY MEDICINE | Facility: CLINIC | Age: 62
End: 2017-09-22
Payer: COMMERCIAL

## 2017-09-22 VITALS
WEIGHT: 240.7 LBS | BODY MASS INDEX: 37.78 KG/M2 | HEIGHT: 67 IN | OXYGEN SATURATION: 95 % | SYSTOLIC BLOOD PRESSURE: 141 MMHG | TEMPERATURE: 96.9 F | HEART RATE: 99 BPM | DIASTOLIC BLOOD PRESSURE: 96 MMHG

## 2017-09-22 DIAGNOSIS — E85.89 OTHER AMYLOIDOSIS (H): ICD-10-CM

## 2017-09-22 DIAGNOSIS — L21.9 SEBORRHEIC DERMATITIS: Primary | ICD-10-CM

## 2017-09-22 DIAGNOSIS — I10 HYPERTENSION GOAL BP (BLOOD PRESSURE) < 140/90: ICD-10-CM

## 2017-09-22 DIAGNOSIS — G47.33 OBSTRUCTIVE SLEEP APNEA SYNDROME: ICD-10-CM

## 2017-09-22 PROCEDURE — 99214 OFFICE O/P EST MOD 30 MIN: CPT | Performed by: FAMILY MEDICINE

## 2017-09-22 RX ORDER — KETOCONAZOLE 20 MG/G
CREAM TOPICAL 2 TIMES DAILY
Qty: 60 G | Refills: 1 | Status: SHIPPED | OUTPATIENT
Start: 2017-09-22 | End: 2018-01-18

## 2017-09-22 NOTE — NURSING NOTE
"Chief Complaint   Patient presents with     Derm Problem       Initial BP (!) 141/96  Pulse 99  Temp 96.9  F (36.1  C) (Oral)  Ht 5' 7\" (1.702 m)  Wt 240 lb 11.2 oz (109.2 kg)  SpO2 95%  BMI 37.7 kg/m2 Estimated body mass index is 37.7 kg/(m^2) as calculated from the following:    Height as of this encounter: 5' 7\" (1.702 m).    Weight as of this encounter: 240 lb 11.2 oz (109.2 kg).  Medication Reconciliation: complete      Health Maintenance that is potentially due pending provider review:  BP was high, used pink card, recheck manually        BAR Mcfarland  "

## 2017-09-22 NOTE — PROGRESS NOTES
"  SUBJECTIVE:   Parmjit Hernández is a 62 year old male who presents to clinic today for the following health issues:      Rash      Duration: x1 month on face and started 2 days ago on penis     Description  Location: face and penis   Itching: severe itching on penis     Intensity:  mild    Accompanying signs and symptoms: ithy, flaky/dry, redness     History (similar episodes/previous evaluation): has had a rash like this once before     Precipitating or alleviating factors:  New exposures:  None  Recent travel: no      Therapies tried and outcome: kenalog on face - not helping with symptoms, has not tried any therapies on penis       ROS:  Constitutional, HEENT, cardiovascular, pulmonary, gi and gu systems are negative, except as otherwise noted.      OBJECTIVE:   BP (!) 141/96  Pulse 99  Temp 96.9  F (36.1  C) (Oral)  Ht 5' 7\" (1.702 m)  Wt 240 lb 11.2 oz (109.2 kg)  SpO2 95%  BMI 37.7 kg/m2  Body mass index is 37.7 kg/(m^2).  GENERAL: healthy, alert and no distress   (male): normal male genitalia without lesions or urethral discharge, no hernia  Skin: Flaky, scaling lesions with underlying erythematous patches, forehead,-central face, nasolabial folds, eyebrows, similar flaky scales on the glans penis.  Patient examined with chaperon, medical assitant- mirtha  Diagnostic Test Results:    ASSESSMENT/PLAN:     (L21.9) Seborrheic dermatitis  (primary encounter diagnosis)  Plan:discussed pathophysiology  Normal skin colonization of fungus, followed by over growth from  Unknown causes- risk factors possibly cold dry environment, stress, sometimes worse with sun exposure- also seen in immunocompromised individual  Treatment; ketoconazole (NIZORAL) 2 % cream twice daily for 8 weeks and then as needed   Next line of treatment can be ciclopirox- but maybe more expensive  \see dermatologist if worsening        Hypertension-uncontrolled  High Blood pressure- has not taken lisinopril for 3 days  He reports he will pick " them up today and resume          Has an up coming appointment for sleep apnea- sleep study- next week    No acute change in history of amylodiosis        The patient indicates understanding of these issues and agrees with the plan.      Maribeth Ardon MD  [facility]

## 2017-09-22 NOTE — PATIENT INSTRUCTIONS
; ketoconazole (NIZORAL) 2 % cream twice daily for 8 weeks and then as needed   Next line of treatment can be ciclopirox- but maybe more expensive  \see dermatologist if worsening

## 2017-09-22 NOTE — MR AVS SNAPSHOT
After Visit Summary   9/22/2017    Parmjit Hernández    MRN: [id number]           Patient Information     Date Of Birth          1955        Visit Information        Provider Department      9/22/2017 10:30 AM Maribeth Ardon MD [facility]        Today's Diagnoses     Seborrheic dermatitis    -  1    Hypertension goal BP (blood pressure) < 140/90        Other amyloidosis (H)        Obstructive sleep apnea syndrome          Care Instructions    ; ketoconazole (NIZORAL) 2 % cream twice daily for 8 weeks and then as needed   Next line of treatment can be ciclopirox- but maybe more expensive  \see dermatologist if worsening            Follow-ups after your visit        Additional Services     DERMATOLOGY REFERRAL       Your provider has referred you to: G: [facility] [place] [phone]   [email or web address]    Please be aware that coverage of these services is subject to the terms and limitations of your health insurance plan.  Call member services at your health plan with any benefit or coverage questions.      Please bring the following with you to your appointment:    (1) Any X-Rays, CTs or MRIs which have been performed.  Contact the facility where they were done to arrange for  prior to your scheduled appointment.    (2) List of current medications  (3) This referral request   (4) Any documents/labs given to you for this referral                  Your next 10 appointments already scheduled     Sep 26, 2017 10:30 AM CDT   New Sleep Patient with Bennett Ezra Goltz, PA-C   [facility] ([facility])    [address] [address] [phone]              Who to contact     If you have questions or need follow up information about today's clinic visit or your schedule please contact [facility] directly at [phone].  Normal or  "non-critical lab and imaging results will be communicated to you by MyChart, letter or phone within 4 business days after the clinic has received the results. If you do not hear from us within 7 days, please contact the clinic through GLO or phone. If you have a critical or abnormal lab result, we will notify you by phone as soon as possible.  Submit refill requests through GLO or call your pharmacy and they will forward the refill request to us. Please allow 3 business days for your refill to be completed.          Additional Information About Your Visit        [employer] Information     GLO gives you secure access to your electronic health record. If you see a primary care provider, you can also send messages to your care team and make appointments. If you have questions, please call your primary care clinic.  If you do not have a primary care provider, please call [phone] and they will assist you.        Care EveryWhere ID     This is your Care EveryWhere ID. This could be used by other organizations to access your [facility] medical records  [id number]        Your Vitals Were     Pulse Temperature Height Pulse Oximetry BMI (Body Mass Index)       99 96.9  F (36.1  C) (Oral) 5' 7\" (1.702 m) 95% 37.7 kg/m2        Blood Pressure from Last 3 Encounters:   09/22/17 (!) 141/96   09/13/17 109/80   08/30/17 119/80    Weight from Last 3 Encounters:   09/22/17 240 lb 11.2 oz (109.2 kg)   09/13/17 244 lb 14.4 oz (111.1 kg)   08/30/17 251 lb 3.2 oz (113.9 kg)              We Performed the Following     DERMATOLOGY REFERRAL          Today's Medication Changes          These changes are accurate as of: 9/22/17 11:09 AM.  If you have any questions, ask your nurse or doctor.               Start taking these medicines.        Dose/Directions    ketoconazole 2 % cream   Commonly known as:  NIZORAL   Used for:  Seborrheic dermatitis   Started by:  Maribeth Ardon MD        Apply topically 2 times daily "   Quantity:  60 g   Refills:  1            Where to get your medicines      These medications were sent to [id number] [address] [place] [address] & [address] [address]     Phone:  [phone]     ketoconazole 2 % cream                Primary Care Provider Office Phone # Fax #    Maribeth Mary Ardon [phone] [address] [facility] [address]        Equal Access to Services     [facility]: Hadii aad ku hadasho Soomaali, waaxda luqadaha, qaybta kaalmada adeegyada, waxeduardo fraserin hayaan jena mckenzie . So [facility] [phone].    ATENCIÓN: Si habla español, tiene a agudelo disposición servicios gratuitos de asistencia lingüística. [facility] [phone].    We comply with applicable federal civil rights laws and [place] laws. We do not discriminate on the basis of race, color, national origin, age, disability sex, sexual orientation or gender identity.            Thank you!     Thank you for choosing [facility]  for your care. Our goal is always to provide you with excellent care. Hearing back from our patients is one way we can continue to improve our services. Please take a few minutes to complete the written survey that you may receive in the mail after your visit with us. Thank you!             Your Updated Medication List - Protect others around you: Learn how to safely use, store and throw away your medicines at www.disposemymeds.org.          This list is accurate as of: 9/22/17 11:09 AM.  Always use your most recent med list.                   Brand Name Dispense Instructions for use Diagnosis    atorvastatin 10 MG tablet    LIPITOR    90 tablet    Take 1 tablet (10 mg) by mouth daily    Hyperlipidemia LDL goal <100       divalproex 500 MG EC tablet    DEPAKOTE    10 tablet    Take 2 tablets (1,000 mg) by mouth daily    Hyperlipidemia LDL goal <100       doxepin 25 MG capsule    SINEquan     10 capsule    Take 2 capsules (50 mg) by mouth At Bedtime    Hyperlipidemia LDL goal <100       furosemide 20 MG tablet    LASIX    90 tablet    Take 1 tablet (20 mg) by mouth 2 times daily    Peripheral edema       gabapentin 800 MG tablet    NEURONTIN    90 tablet    Take 1 tablet (800 mg) by mouth 3 times daily    Chronic abdominal pain       ketoconazole 2 % cream    NIZORAL    60 g    Apply topically 2 times daily    Seborrheic dermatitis       lisinopril 10 MG tablet    PRINIVIL/ZESTRIL    90 tablet    TAKE 1 TABLET BY MOUTH DAILY    Hypertension goal BP (blood pressure) < 140/90       metFORMIN 500 MG 24 hr tablet    GLUCOPHAGE-XR    180 tablet    Take 2 tablets (1,000 mg) by mouth daily (with dinner)    Type 2 diabetes mellitus without complication, without long-term current use of insulin (H)       OLANZapine 7.5 MG tablet    zyPREXA    5 tablet    Take 1 tablet (7.5 mg) by mouth At Bedtime    Hyperlipidemia LDL goal <100, Generalized edema, Chronic abdominal pain       * order for DME     1 Device    Equipment being ordered: CPAP    Obstructive sleep apnea       * order for DME     2 Box    Equipment being ordered: sharla hose    Peripheral edema       potassium chloride SA 10 MEQ CR tablet    K-DUR/KLOR-CON M    90 tablet    Take 1 tablet (10 mEq) by mouth daily    Peripheral edema       pramipexole 0.125 MG tablet    MIRAPEX    30 tablet    Take 1 tablet (0.125 mg) by mouth At Bedtime    Restless legs syndrome (RLS)       SUMAtriptan 5 MG/ACT nasal spray    IMITREX    1 each    Spray 1-2 sprays in nostril as needed for migraine May repeat in 2 hours. Max 8 sprays/24 hours.    Abdominal migraine, intractable       triamcinolone 0.1 % cream    KENALOG    15 g    Apply sparingly to affected area three times daily for 14 days.    Seborrheic dermatitis       * Notice:  This list has 2 medication(s) that are the same as other medications prescribed for you. Read the directions carefully, and ask your doctor or  other care provider to review them with you.

## 2017-09-26 ENCOUNTER — OFFICE VISIT (OUTPATIENT)
Dept: SLEEP MEDICINE | Facility: CLINIC | Age: 62
End: 2017-09-26
Attending: FAMILY MEDICINE
Payer: COMMERCIAL

## 2017-09-26 VITALS
WEIGHT: 242.4 LBS | SYSTOLIC BLOOD PRESSURE: 111 MMHG | HEIGHT: 67 IN | BODY MASS INDEX: 38.04 KG/M2 | RESPIRATION RATE: 16 BRPM | OXYGEN SATURATION: 95 % | HEART RATE: 92 BPM | DIASTOLIC BLOOD PRESSURE: 57 MMHG

## 2017-09-26 DIAGNOSIS — G47.33 OBSTRUCTIVE SLEEP APNEA SYNDROME: Primary | ICD-10-CM

## 2017-09-26 DIAGNOSIS — G47.01 INSOMNIA DUE TO MEDICAL CONDITION: ICD-10-CM

## 2017-09-26 DIAGNOSIS — R40.0 DAYTIME SOMNOLENCE: ICD-10-CM

## 2017-09-26 PROCEDURE — 99204 OFFICE O/P NEW MOD 45 MIN: CPT | Performed by: PHYSICIAN ASSISTANT

## 2017-09-26 NOTE — PROGRESS NOTES
Sleep Consultation:    Date on this visit: 9/26/2017    Parmjit Hernández is sent by Maribeth Ardon for a sleep consultation regarding sleepiness and NORMA.    Primary Physician: Maribeth Ardon     Parmjit Hernández reports feeling tired all of the time for 10+ years and feeling his CPAP is not properly calibrated. His medical history is significant for DM 2, bipolar I disorder, anxiety, OCD, amyloidosis.      He was having chronic pain in his abdomen for 2.5 years. It is unclear if that was from amyloidosis. He had chemo and stem cell therapy at [facility], but the pain returned. The pain has been in remission for the last 7 weeks. He has non-iron deficient anemia and will be seeing a hematologist.    He had a PSG here on 5/28/2008. His AHI was 98.3/hr with an O2 sawyer of 76%. He weighed 285 pounds.     His CPAP is set at 20 cm. His current machine is less than a year old. He has not been using the CPAP much because he has developed a rash on his face that he is treating. He uses a full face mask. It fits very well. He was still very tired even when he was using the CPAP more regularly. He is not sure if the CPAP is delivering enough air. He uses the ramp to drop the pressure to 10 cm. He says it feel the mask will come off of his face if he does not hit the ramp. He is not told that he snores with the CPAP. He shared a room with a friend when at [facility] last December through February. He otherwise is not observed while sleeping. He denies dry nose or mouth with CPAP for the most part. He does use the humidifier. He denies difficulty breathing with the pressure. He has not noticed signs of aerophagia and does not notice CPAP use relating to his abdominal pain.     The compliance data shows that he has used the CPAP for 11/30 nights, 6.7% of nights for >4 hours.  The 90th% pressure is 20 cm.  The average time in large leak is 0 sec.  The average nightly usage is 2:34.  The average AHI is 7.7/hr (5.5/hr are OAs). Some  nights, his AHI is below 5/hr. One night, it was 43/hr. He does not know what might have been different that night.     Parmjit goes to sleep at 11:00 PM during the week. It can vary significantly (10 PM to 1 AM). He wakes up at 8:00 AM, sometimes with an alarm (6:30-8:30 AM). He falls asleep in 30-90 minutes.  Parmjit has difficulty falling asleep.  He takes 50 mg doxepin to help him fall asleep. He was needing the medication more when having severe pain in his abdomen. He wakes up 4 times a night for 30 minutes before falling back to sleep.  Parmjit wakes up to go to the bathroom and eat a snack.  On weekends, his sleep schedule is the same.  Patient gets an average of 4 hours of sleep per night. He has tried trazodone in the past. It did not work. He tried zolpidem but drove to [employer] and shopped without memory of the event. He was also recently on clonazepam for a number of years.  He says he has always been a troubled sleeper, even in childhood. He would stay up to 2 AM reading.      Patient does not use electronics in bed, watch TV in bed and read in bed. He sometimes ruminates at night. That has been better lately. He is anxious about money and employment.     Parmjit does not work currently. He was a technology executive. He is on disability. He lives alone.      Parmjit sleeps on his side. He denies snort arousals, morning dry mouth, morning headaches, morning confusion and restless legs. 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 He is on 800 mg gabapentin three times daily and 0.125 pramipexole at bedtime, which has helped. Parmjit denies any bruxism, sleep walking, sleep talking, dream enactment, sleep paralysis, cataplexy and hypnogogic/hypnopompic hallucinations. He sometimes feels short of breath, randomly. He denies orthopnea and nocturnal dyspnea. His breathing pattern at rest today sounds periodic. He had an ECHO on 9/6/17 that showed a normal LVEF of 65%.    Parmjit has depression and anxiety,  denies difficulty breathing through his nose, reflux at night and heartburn.  He has some claustrophobia, but that has not been an issue with CPAP.    Parmjit has lost 40 pounds in 10 years.  Patient describes themself as a night person.  He would prefer to go to sleep at 1:00 AM and wake up at 9:00 AM.  Patient's [place] Sleepiness score 19/24 consistent with severe daytime sleepiness.  He is most tired in the morning.    Parmjit naps daily for 60 minutes, feels refreshed after naps. He says he does not always fall asleep for his naps. He takes frequent inadvertant naps. He has dozed in conversation, working on the computer. He admits rare dozing while driving. The most recent episode was 3 week(s) ago.  Patient was counseled on the importance of driving while alert, to pull over if drowsy, or nap before getting into the vehicle if sleepy.  He uses no caffeine. He used to have 2-3 sodas per day. Caffeine did not affect his sleepiness.      Allergies:    Allergies   Allergen Reactions     Liraglutide Other (See Comments)     Sulfa Drugs Swelling     Pt has taken  Taken sulfa drugs orally without trouble. He had problems with Sulfa eye drops. Eye swelled up     Victoza      Increased migraine frequency and severity       Medications:    Current Outpatient Prescriptions   Medication Sig Dispense Refill     ketoconazole (NIZORAL) 2 % cream Apply topically 2 times daily 60 g 1     lisinopril (PRINIVIL/ZESTRIL) 10 MG tablet TAKE 1 TABLET BY MOUTH DAILY 90 tablet 1     furosemide (LASIX) 20 MG tablet Take 1 tablet (20 mg) by mouth 2 times daily 90 tablet 1     potassium chloride SA (K-DUR/KLOR-CON M) 10 MEQ CR tablet Take 1 tablet (10 mEq) by mouth daily 90 tablet 1     pramipexole (MIRAPEX) 0.125 MG tablet Take 1 tablet (0.125 mg) by mouth At Bedtime 30 tablet 1     gabapentin (NEURONTIN) 800 MG tablet Take 1 tablet (800 mg) by mouth 3 times daily 90 tablet 11     triamcinolone (KENALOG) 0.1 % cream Apply sparingly to  affected area three times daily for 14 days. 15 g 0     atorvastatin (LIPITOR) 10 MG tablet Take 1 tablet (10 mg) by mouth daily 90 tablet 3     divalproex (DEPAKOTE) 500 MG EC tablet Take 2 tablets (1,000 mg) by mouth daily 10 tablet 0     doxepin (SINEQUAN) 25 MG capsule Take 2 capsules (50 mg) by mouth At Bedtime 10 capsule 0     OLANZapine (ZYPREXA) 7.5 MG tablet Take 1 tablet (7.5 mg) by mouth At Bedtime 5 tablet 0     metFORMIN (GLUCOPHAGE-XR) 500 MG 24 hr tablet Take 2 tablets (1,000 mg) by mouth daily (with dinner) 180 tablet 1     order for DME Equipment being ordered: sharla hose 2 Box 1     SUMAtriptan (IMITREX) 5 MG/ACT nasal spray Spray 1-2 sprays in nostril as needed for migraine May repeat in 2 hours. Max 8 sprays/24 hours. 1 each 3     order for DME Equipment being ordered: CPAP 1 Device 0       Problem List:  Patient Active Problem List    Diagnosis Date Noted     Type 2 diabetes mellitus with other specified complication (H) 07/10/2017     Priority: Medium     Obstructive sleep apnea syndrome 06/29/2017     Priority: Medium     sleep study       Restless legs syndrome (RLS) 06/29/2017     Priority: Medium     Narcotic dependence (H) 09/06/2016     Priority: Medium     None in about 6 months- 8/1/17       Insomnia due to medical condition 08/23/2016     Priority: Medium     Other amyloidosis (H) 06/01/2016     Priority: Medium     [id number]       Anxiety 05/11/2016     Priority: Medium     Chronic abdominal pain 03/15/2016     Priority: Medium     Patient is followed by RAYMOND FUNG for ongoing prescription of pain medication.  All refills should be approved by this provider, or covering partner.    Medication(s): Norco  .   Maximum quantity per month:   Clinic visit frequency required:      Controlled substance agreement on file: No    Pain Clinic evaluation in the past:      DIRE Total Score(s):  No flowsheet data found.    Last [place] website verification:  Done on  7/26/16   [email or web address]         Bipolar I disorder (H) 09/01/2015     Priority: Medium     Marianne (H) 08/20/2015     Priority: Medium     MENTAL HEALTH 08/17/2015     Priority: Medium     History of diverticulitis 01/27/2015     Priority: Medium     1/15-rxed with antibiotics       Advanced directives, counseling/discussion 05/29/2012     Priority: Medium     Discussed advance care planning with patient; information given to patient to review. 5/29/2012     Honoring choices letter mailed. 7-  Lynsey Bustamante RT (R)         Migraine 05/29/2012     Priority: Medium     Hypertension goal BP (blood pressure) < 140/90 10/11/2011     Priority: Medium     HYPERLIPIDEMIA LDL GOAL <100 10/31/2010     Priority: Medium     Type 2 diabetes mellitus without complication, without long-term current use of insulin (H) 05/22/2009     Priority: Medium     Obstructive sleep apnea 07/16/2008     Priority: Medium        Past Medical/Surgical History:  Past Medical History:   Diagnosis Date     Allergic state      Amyloidosis (H)      Diabetes mellitus (H)     type 2     Headache(784.0)      Hypertension 2007     Myalgia and myositis, unspecified      Obsessive-compulsive disorders      Other acquired absence of organ 94     Other specified viral warts      Pain in the abdomen      Pure hypercholesterolemia      Past Surgical History:   Procedure Laterality Date     BONE MARROW BIOPSY, BONE SPECIMEN, NEEDLE/TROCAR N/A 6/8/2016    Procedure: BIOPSY BONE MARROW;  Surgeon: Nathan Agrawal MD;  Location:  GI     C NONSPECIFIC PROCEDURE  94    Cholecystectomy     C NONSPECIFIC PROCEDURE  2000    repair deviated septum     CHOLECYSTECTOMY  1995    lap qian     COLONOSCOPY  2012    hx polyps     ESOPHAGOSCOPY, GASTROSCOPY, DUODENOSCOPY (EGD), COMBINED N/A 5/29/2015    Procedure: COMBINED ESOPHAGOSCOPY, GASTROSCOPY, DUODENOSCOPY (EGD), BIOPSY SINGLE OR MULTIPLE;  Surgeon: John Jacob MD;  Location:   GI     HERNIA REPAIR, UMBILICAL  2006       Social History:  Social History     Social History     Marital status:      Spouse name: N/A     Number of children: 3     Years of education: N/A     Occupational History      [employer]     Social History Main Topics     Smoking status: Never Smoker     Smokeless tobacco: Never Used     Alcohol use No      Comment: rarely     Drug use: No     Sexual activity: No     Other Topics Concern     Parent/Sibling W/ Cabg, Mi Or Angioplasty Before 65f 55m? No     Social History Narrative    Social Documentation:05/27/2010        Balanced Diet: NO    Calcium intake: 3-4 per day    Caffeine: 2 per day    Exercise:  type of activity NO    Sunscreen: Yes    Seatbelts:  Yes    Self Breast Exam:  No - na    Self Testicular Exam: no    Physical/Emotional/Sexual Abuse: No     Do you feel safe in your environment? Yes        Cholesterol screen up to date: Yes    Eye Exam up to date: Yes    Dental Exam up to date: Yes    Pap smear up to date: Does Not Apply    Mammogram up to date: Does Not Apply    Dexa Scan up to date:NO    Colonoscopy up to date:2007    Immunizations up to date: YES    Glucose screen if over 40: Yes        Sofi Rosas CMA                       Family History:  Family History   Problem Relation Age of Onset     CEREBROVASCULAR DISEASE Mother      C.A.D. Mother      Hypertension Mother      Alcohol/Drug Mother      Arthritis Mother      CEREBROVASCULAR DISEASE Maternal Grandmother      C.A.D. Maternal Grandmother      Alcohol/Drug Maternal Grandmother      C.A.D. Father      HEART DISEASE Father      Hypertension Father      Alcohol/Drug Father      Allergies Father      Circulatory Father      Depression Father      Respiratory Father      DIABETES Brother      Alcohol/Drug Paternal Grandfather      CEREBROVASCULAR DISEASE Paternal Grandfather      Allergies Sister      Depression Sister      Gynecology Sister      Depression Son      Psychotic  Disorder Son      anxiety disorder     Depression Daughter      CEREBROVASCULAR DISEASE Maternal Grandfather      CEREBROVASCULAR DISEASE Paternal Grandmother      Coronary Artery Disease No family hx of      Hyperlipidemia No family hx of      Breast Cancer No family hx of      Colon Cancer No family hx of      Prostate Cancer No family hx of      Other Cancer No family hx of      Anxiety Disorder No family hx of      MENTAL ILLNESS No family hx of      Substance Abuse No family hx of      Anesthesia Reaction No family hx of      Asthma No family hx of      OSTEOPOROSIS No family hx of      Genetic Disorder No family hx of      Thyroid Disease No family hx of      Obesity No family hx of      Unknown/Adopted No family hx of        Review of Systems:  A complete review of systems reviewed by me is negative with the exeption of what has been mentioned in the history of present illness.  CONSTITUTIONAL: NEGATIVE for weight gain, fever, chills, sweats or night sweats.  CONSTITUTIONAL:  POSITIVE for  weight loss and drug allergies   EYES: NEGATIVE for changes in vision, blind spots, double vision.  ENT: NEGATIVE for ear pain, sore throat, sinus pain, post-nasal drip, runny nose, bloody nose  CARDIAC: NEGATIVE for fast heartbeats or fluttering in chest, chest pain or pressure, breathlessness when lying flat.  CARDIAC:  POSITIVE for  swollen legs, swollen feet and HTN  NEUROLOGIC: NEGATIVE headaches, weakness or numbness in the arms or legs.  DERMATOLOGIC: NEGATIVE for new moles or change in mole(s)  DERMATOLOGIC:  POSITIVE for rashes-face  PULMONARY: NEGATIVE SOB at rest, dry cough, productive cough, coughing up blood, wheezing or whistling when breathing.    PULMONARY:  POSITIVE for  SOB with activity  GASTROINTESTINAL: NEGATIVE for nausea or vomitting, loose or watery stools, fat or grease in stools, constipation, abdominal pain, bowel movements black in color or blood noted.  GENITOURINARY: NEGATIVE for pain during  "urination, blood in urine, urinating more frequently than usual, irregular menstrual periods.  MUSCULOSKELETAL: NEGATIVE for muscle pain, bone or joint pain, swollen joints.  ENDOCRINE: NEGATIVE for increased thirst or urination.  ENDOCRINE:  POSITIVE for  diabetes  LYMPHATIC: NEGATIVE for swollen lymph nodes, lumps or bumps in the breasts or nipple discharge.  MENTAL HEALTH: POSITIVE for depression and anxiety    Physical Examination:  Vitals: /57  Pulse 92  Resp 16  Ht 1.702 m (5' 7\")  Wt 110 kg (242 lb 6.4 oz)  SpO2 95%  BMI 37.97 kg/m2  BMI= Body mass index is 37.97 kg/(m^2).    Neck Cir (cm): 45 cm    [place] Total Score 9/26/2017   Total score - [place] 19       GENERAL APPEARANCE: healthy, alert, no distress and cooperative  EYES: Eyes grossly normal to inspection, PERRL, conjunctivae and sclerae normal and lids and lashes normal  HENT: nose and mouth without ulcers or lesions, oropharynx crowded, tongue base enlarged and rash on face  NECK: no adenopathy, no asymmetry, masses, or scars, thyroid normal to palpation and trachea midline and normal to palpation  RESP: crackles at bases bilaterally, worse on left  CV: regular rates and rhythm, normal S1 S2, no S3 or S4, no murmur, click or rub and no irregular beats  LYMPHATICS: normal ant/post cervical and supraclavicular nodes  MS: extremities normal- no gross deformities noted and pitting 1+ lower extremity edema bilaterally  NEURO: Normal strength and tone, mentation intact, speech normal and cranial nerves 2-12 intact  Mallampati Class: III.  Tonsillar Stage: 1  hidden by pillars.    Impression/Plan:    (G47.33) Obstructive sleep apnea syndrome  (primary encounter diagnosis), (R40.0) Daytime somnolence  Comment: Mr. Hernández presents today with concerns of excessive sleepiness and concerns that his CPAP settings are not correct. Mr. Hernández was diagnosed with severe NORMA in 2008. His AHI was 95/hr. He was titrated to CPAP 20 cm with no residual " hypoxemia. He has lost 40 pounds. His download shows his residual AHI is still 7/hr on average. Some days his AHI is <5/hr but one day it was 43/hr. Although his mask fit looks good for the most part, he sometimes feels a pressure of 20 cm is too strong and may blow the mask off of his face. He has not been using CPAP much lately because he has a rash on his face. He says he was sleepy even before his CPAP use was reduced. His residual AHI was 7.5/hr, mostly obstructive.    Plan: Comprehensive DME        Changed CPAP to auto mode with pressures 17-20 cm. Flex was set to 3. We reviewed how to adjust the humidity settings.  I would like to give him a few months to heal from the rash and then get back on CPAP regularly. If he can increase his use and his apnea appears well controlled without resolution of sleepiness, we may consider further workup of the breathing and sleepiness. I would first try to improve the consistency of his sleep schedule. He will be working with hematology to treat his anemia.       (G47.01) Insomnia due to medical condition  Comment: He says he has been a troubled sleeper since childhood. He recalls staying up reading until 2 AM as a 7 or 8 year old. His sleeps schedule is currently irregular, with a bedtime ranging from 10 PM to 1 AM and wake time ranging from 6:30-8:30 AM. He is currently on high doses of doxepin (50 mg), which he felt was necessary when he was experiencing chronic pain from a stomach issue. The stomach pain has largely resolved in the last 7 weeks, but he still has trouble sleeping. He wakes 3-4 times per night for the restroom.  Plan: Only be in bed between 1 and 8 AM. Avoid naps and sleeping in. Try to get 30 minutes of bright light exposure upon awakening. Avoid bright light, including electronics, in the hour before bedtime. Try 1 mg melatonin 3-5 hours prior to typical sleep onset.      Literature provided regarding insomnia and circadian rhythm disorders.      He will  follow up with me in approximately 3 months.       Polysomnography reviewed.  Obstructive sleep apnea reviewed.  Complications of untreated sleep apnea were reviewed.  45 minutes was spent during this visit, over 50% in counseling and coordination of care.   Bennett Goltz, PA-C    CC: Maribeth Ardon

## 2017-09-26 NOTE — MR AVS SNAPSHOT
After Visit Summary   9/26/2017    Parmjit Hernández    MRN: [id number]           Patient Information     Date Of Birth          1955        Visit Information        Provider Department      9/26/2017 10:30 AM Goltz, Bennett Ezra, PA-C [facility]        Today's Diagnoses     Obstructive sleep apnea syndrome    -  1    Daytime somnolence        Insomnia due to medical condition          Care Instructions    Instructions for treating Delayed Sleep Phase Syndrome:    Try to only be in bed between 1 and 8 AM. You may nap for an hour in the day if you feel it helps, but you may sleep better at night if you avoid napping.    Delayed Sleep Phase Syndrome (DSPS) means that your body's internal timing is set late compared to the 24 hour day. Therefore, it is often difficult to get up on time for work in the morning and sometimes difficult to fall asleep on time, in order to get enough sleep. People with DSPS often tend to like to stay up late on weekends and sleep in until between 10 AM and noon, sometimes even later.This is actually a bad habit that will perpetuate the problem. It reinforces your body's tendency to be on that later schedule.    You should go to bed when you are sleepy and ready to sleep. During this entire process, you should not engage in activities that may make it worse, such as watching TV in bed, leaving the TV on all night, drinking any caffeine 6 hours before bed or exercising 1-2 hours before bed.     Start taking Melatonin, 0.5-1 mg tablet 3-5 hours before the time that you fall asleep on average (not your desired bedtime or time that you get in bed, but the time you normally fall asleep on your own).     Upon awakening, get exposure to sun-light for about 30-45 minutes. You do not need to look at the sun, in fact, this is dangerous. Reading the paper with the sun shining on you is adequate.  Alternatively, you may use a Seasonal Affective Disorder Lamp (intensity  10,000 Lux) instead of the sun. The lamp should be positioned 1-2 arms lengths away from you. They lamps are sold at Home Medical Companies such as [employer], [employer] or [employer]. A prescription can be written to get insurance coverage in some cases. They are also sold on [employer].com.    Using the light and melatonin should help march your internal clock (known as your circadian rhythms) gradually earlier. As your bedtime advances, remember to take your melatonin earlier, keeping it 5 hours before your fall asleep time.    Avoid naps and sleeping in because sleeping during the day will delay your body's clock and you will have to start from scratch.     More information about light therapy:  If the cost of any light box is too much, you can also purchase a compact fluorescent all spectrum light bulb at a local hardware store for about $8.  The most commonly available bulb is 1400 lumen.  You would need two of these positioned within 1 meter of yourself to be equivalent to 2,500 lux.  The bulbs can be placed in a standard light fixture.  Additionally, they can be placed in a mountable fixture that is used in greenhouses.  Mountable fixtures are also available at hardware stores for about $9.  Do not look directly at the light.  If you have any concerns regarding the safety of bright light therapy for you, it is recommended that you consult an ophthalmologist before using a light box.  If you have a condition that makes your eyes very sensitive to light, macular degeneration, a family history of such problems, or diabetic changes to your eyes, consult an ophthalmologist before using a light box. If you have anxiety disorder and have an increase in anxiety discontinue use.  General recommendations for sleep problems (Insomnia)  Allow 2-4 weeks to see results     Establish a regular sleep schedule    Most people only need 7-8 hours of sleep.  Don't be in bed longer than you need     to sleep or you will end up  spending more time awake in bed. This trains your    brain to think of the bed as a place to not sleep.  Go to bed at same time each night   Get up at same time each day - Set an alarm everyday (even weekends). This is one of    the most important tips. It prevents you from relying on your insomnia to get you    up on time for your day. That actually reinforces insomnia. It also will help your    body get into a pattern where you start feeling tired at a consistent time each    night.  The body functions best when you keep a consistent routine.  Avoid sleeping-in and napping. Anytime you sleep during the day, you will be less tired at    night. You may be tired enough to fall asleep, but you will wake more in the    middle of the night because you will have met your sleep need before the night is    done.   Cut down time in bed (if not asleep, get up)- Use your bed only for sleep and sex    Anytime you spend time in bed doing activities other than sleep (reading,    watching TV, working, playing on the computer or phone, or even just laying in    bed trying to sleep), you are training  your brain to think of the bed as a place to    do activities other than sleep. If you are not falling asleep within 20-30 minutes,    get out of bed. While out of bed, avoid bright lights. Avoid work or chores. Being    productive in the middle of the night reinforces waking up at night. Find relaxing,    not particularly entertaining activities like reading, listening to music, or relaxation    exercises. Go back to bed if you start feeling groggy, or after about 30 minutes,    even if not feeling very tired. Sometimes, just getting out of bed stops the pattern    of getting frustrated about laying in bed not sleeping, and that can help you fall    asleep.   Avoid trying to force yourself to sleep- sleep is not like everything else. The harder you    work at most things, the more you can accomplish. The harder you work at    sleep,  the less you will sleep.     Make the bedroom comfortable - quiet, dark and cool are better. Consider ear plugs    (silicon). Use dark blinds or wear an eye mask if needed     Make a relaxing routine prior to bedtime  Relaxation exercises:   Progressive muscle relaxation: Relax each muscle group individually    Begin with your feet, flex, then relax. Try to imagine your feet feeling heavy and sinking into the bed. Move to your calves, do the same thing. Work through each muscle group toward your head.    Relaxing Mental Imagery: Try to imagine a trip that you took and found relaxing, or imagine a day at the beach. Try to walk yourself through the day in your mind as if you were dreaming it. Try to imagine sensing the different experiences, such as feeling sand between your toes, the heat of the sun on your skin, seeing the waves crashing the shore, the smell of the salt water, etc.     Deal with your worries before bedtime    Set aside a worry time around dinner time for 10-15 minutes. Write down the    things that are on your mind. Plan time in the coming days to address those    issues. Brainstorm ideas on how you will deal with them. Try to identify issues    that are out of your control, and try to let those issues go.  Listen to relaxation tapes   Classical Music or Nature sounds   Back Massage   Get regular exercise each day (at least 1-2 hours before bedtime)   Take medications only as directed   Eat a light bedtime snack or warm drink   Warm milk   Warm herbal tea (non-caffeinated)       Things to avoid   No overstimulating activities just before bed   No competitive games before bedtime   No exciting television programs before bedtime   Avoid caffeine after lunchtime   Avoid chocolate   Do not use alcohol to induce sleep (worsens Insomnia)   Do not take someone else's sleeping pills   Do not look at the clock when awakening   Do not turn on light when getting up to use bathroom, use a nightlight     Online  Programs     www.SHUTi.me (pronounced shut eye). There is a fee for this program. Enter the code  [facility]  if you decide to enroll in this program.      www.sleepIO.com (pronounced sleep ee oh). There is a fee for this program. Enter the code  [facility]  if you decide to enroll in this program.     Suggested Resources  Insomnia Treatment Books     Overcoming Insomnia by Jacob Solano and Portia Samson (2008)    No More Sleepless Nights by Mario Collado and Fatou Sanches (1996)    Say Eddie to Insomnia by Joel Rodgers (2009)    The Insomnia Workbook by Avelina Watts and Devin Coppola (2009)    The Insomnia Answer by Rad Nixon and Julio Drake (2006)      Stress Management and Relaxation Books    The Relaxation and Stress Reduction Workbook by Alisha May, Emily Hutchinson and Regino Kim (2008)    Stress Management Workbook: Techniques and Self-Assessment Procedures by Ramona Avery and John Longoria (1997)    A Mindfulness-Based Stress Reduction Workbook by Vikram Lang and Alexus Ryan (2010)    The Complete Stress Management Workbook by Placido Mohan and Morgan Underwood (1996)    Assert Yourself by Bernice Barry and Ravi Barry (1977)    Relaxation Resources for Computer Download   These websites offer resources to help you relax. This list is for information only. [facility] is not responsible for the quality of services or the actions of any person or organization.  Progressive Muscle Relaxation (PMR):     http://www.[employer]/progressive-muscle-relaxation-exercise.html     http://studentsupport.[facility]/counseling/resources/self-help/relaxation-and-stress-management/   Deep Breathing Exercises:    http://www.[employer]/breathing-awareness.html     Meditation:     www."[employer]"    www.theSettleguided-meditation-site.com You may have to pay for some of these resources.    Guided  Imagery:    http://www.[employer].[place]/guided-imagery-scripts.html     http://[employer]/library/kteirvpnoc-lzzevm-rthqroh/     Counseling / Behavioral Health  [facility]  Visit [email or web address] or call [phone] to find a clinic close to you.  Or call [phone] for [facility].    Your BMI is Body mass index is 37.97 kg/(m^2).  Weight management is a personal decision.  If you are interested in exploring weight loss strategies, the following discussion covers the approaches that may be successful. Body mass index (BMI) is one way to tell whether you are at a healthy weight, overweight, or obese. It measures your weight in relation to your height.  A BMI of 18.5 to 24.9 is in the healthy range. A person with a BMI of 25 to 29.9 is considered overweight, and someone with a BMI of 30 or greater is considered obese. More than two-thirds of American adults are considered overweight or obese.  Being overweight or obese increases the risk for further weight gain. Excess weight may lead to heart disease and diabetes.  Creating and following plans for healthy eating and physical activity may help you improve your health.  Weight control is part of healthy lifestyle and includes exercise, emotional health, and healthy eating habits. Careful eating habits lifelong are the mainstay of weight control. Though there are significant health benefits from weight loss, long-term weight loss with diet alone may be very difficult to achieve- studies show long-term success with dietary management in less than 10% of people. Attaining a healthy weight may be especially difficult to achieve in those with severe obesity. In some cases, medications, devices and surgical management might be considered.  What can you do?  If you are overweight or obese and are interested in methods for weight loss, you should discuss this with your provider.     Consider reducing daily calorie  intake by 500 calories.     Keep a food journal.     Avoiding skipping meals, consider cutting portions instead.    Diet combined with exercise helps maintain muscle while optimizing fat loss. Strength training is particularly important for building and maintaining muscle mass. Exercise helps reduce stress, increase energy, and improves fitness. Increasing exercise without diet control, however, may not burn enough calories to loose weight.       Start walking three days a week 10-20 minutes at a time    Work towards walking thirty minutes five days a week     Eventually, increase the speed of your walking for 1-2 minutes at time    In addition, we recommend that you review healthy lifestyles and methods for weight loss available through the National Institutes of Health patient information sites:  http://win.niddk.nih.gov/publications/index.htm    And look into health and wellness programs that may be available through your health insurance provider, employer, local community center, or ronald club.    Weight management plan: Patient was referred to their PCP to discuss a diet and exercise plan.            Follow-ups after your visit        Follow-up notes from your care team     Return in about 3 months (around 12/26/2017) for CPAP compliance recheck.      Your next 10 appointments already scheduled     Dec 20, 2017 10:30 AM CST   Return Sleep Patient with Bennett Ezra Goltz, PA-C   [facility] ([facility])    [address] [address] [phone]              Who to contact     If you have questions or need follow up information about today's clinic visit or your schedule please contact [facility] directly at [phone].  Normal or non-critical lab and imaging results will be communicated to you by MyChart, letter or phone within 4 business days after the clinic has received the results. If you do not hear from us within 7  "days, please contact the clinic through [employer] or phone. If you have a critical or abnormal lab result, we will notify you by phone as soon as possible.  Submit refill requests through [employer] or call your pharmacy and they will forward the refill request to us. Please allow 3 business days for your refill to be completed.          Additional Information About Your Visit        [employer] Information     [employer] gives you secure access to your electronic health record. If you see a primary care provider, you can also send messages to your care team and make appointments. If you have questions, please call your primary care clinic.  If you do not have a primary care provider, please call [phone] and they will assist you.        Care EveryWhere ID     This is your Care EveryWhere ID. This could be used by other organizations to access your [facility] medical records  [id number]        Your Vitals Were     Pulse Respirations Height Pulse Oximetry BMI (Body Mass Index)       92 16 1.702 m (5' 7\") 95% 37.97 kg/m2        Blood Pressure from Last 3 Encounters:   09/26/17 111/57   09/22/17 (!) 141/96   09/13/17 109/80    Weight from Last 3 Encounters:   09/26/17 110 kg (242 lb 6.4 oz)   09/22/17 109.2 kg (240 lb 11.2 oz)   09/13/17 111.1 kg (244 lb 14.4 oz)              We Performed the Following     [facility]     SLEEP EVALUATION & MANAGEMENT REFERRAL - ADULT        Primary Care Provider Office Phone # Fax #    Maribeth Mary Ardon [phone] [address] [facility] [address]        Equal Access to Services     [facility]: Hadii aad ku hadasho Soomaali, waaxda luqadaha, qaybta kaalmada glenis alves . So [facility] [phone].    ATENCIÓN: Si habla español, tiene a agudelo disposición servicios gratuitos de asistencia lingüística. Llame al [phone].    We comply with applicable federal civil rights laws and [place] laws. We do not discriminate " on the basis of race, color, national origin, age, disability sex, sexual orientation or gender identity.            Thank you!     Thank you for choosing [facility]  for your care. Our goal is always to provide you with excellent care. Hearing back from our patients is one way we can continue to improve our services. Please take a few minutes to complete the written survey that you may receive in the mail after your visit with us. Thank you!             Your Updated Medication List - Protect others around you: Learn how to safely use, store and throw away your medicines at www.disposemymeds.org.          This list is accurate as of: 9/26/17 12:10 PM.  Always use your most recent med list.                   Brand Name Dispense Instructions for use Diagnosis    atorvastatin 10 MG tablet    LIPITOR    90 tablet    Take 1 tablet (10 mg) by mouth daily    Hyperlipidemia LDL goal <100       divalproex 500 MG EC tablet    DEPAKOTE    10 tablet    Take 2 tablets (1,000 mg) by mouth daily    Hyperlipidemia LDL goal <100       doxepin 25 MG capsule    SINEquan    10 capsule    Take 2 capsules (50 mg) by mouth At Bedtime    Hyperlipidemia LDL goal <100       furosemide 20 MG tablet    LASIX    90 tablet    Take 1 tablet (20 mg) by mouth 2 times daily    Peripheral edema       gabapentin 800 MG tablet    NEURONTIN    90 tablet    Take 1 tablet (800 mg) by mouth 3 times daily    Chronic abdominal pain       ketoconazole 2 % cream    NIZORAL    60 g    Apply topically 2 times daily    Seborrheic dermatitis       lisinopril 10 MG tablet    PRINIVIL/ZESTRIL    90 tablet    TAKE 1 TABLET BY MOUTH DAILY    Hypertension goal BP (blood pressure) < 140/90       metFORMIN 500 MG 24 hr tablet    GLUCOPHAGE-XR    180 tablet    Take 2 tablets (1,000 mg) by mouth daily (with dinner)    Type 2 diabetes mellitus without complication, without long-term current use of insulin (H)       OLANZapine 7.5 MG tablet    zyPREXA    5  tablet    Take 1 tablet (7.5 mg) by mouth At Bedtime    Hyperlipidemia LDL goal <100, Generalized edema, Chronic abdominal pain       * order for DME     1 Device    Equipment being ordered: CPAP    Obstructive sleep apnea       * order for DME     2 Box    Equipment being ordered: sharla hose    Peripheral edema       potassium chloride SA 10 MEQ CR tablet    K-DUR/KLOR-CON M    90 tablet    Take 1 tablet (10 mEq) by mouth daily    Peripheral edema       pramipexole 0.125 MG tablet    MIRAPEX    30 tablet    Take 1 tablet (0.125 mg) by mouth At Bedtime    Restless legs syndrome (RLS)       SUMAtriptan 5 MG/ACT nasal spray    IMITREX    1 each    Spray 1-2 sprays in nostril as needed for migraine May repeat in 2 hours. Max 8 sprays/24 hours.    Abdominal migraine, intractable       triamcinolone 0.1 % cream    KENALOG    15 g    Apply sparingly to affected area three times daily for 14 days.    Seborrheic dermatitis       * Notice:  This list has 2 medication(s) that are the same as other medications prescribed for you. Read the directions carefully, and ask your doctor or other care provider to review them with you.

## 2017-09-26 NOTE — NURSING NOTE
"Chief Complaint   Patient presents with     Sleep Problem     Consult, \"Extremely tired all the time, CPAP concerns.\"       Initial /57  Pulse 92  Resp 16  Ht 1.702 m (5' 7\")  Wt 110 kg (242 lb 6.4 oz)  SpO2 95%  BMI 37.97 kg/m2 Estimated body mass index is 37.97 kg/(m^2) as calculated from the following:    Height as of this encounter: 1.702 m (5' 7\").    Weight as of this encounter: 110 kg (242 lb 6.4 oz).  Medication Reconciliation: complete     ESS 19  Neck 45cm  Gloria Jon      "

## 2017-09-26 NOTE — PATIENT INSTRUCTIONS
Instructions for treating Delayed Sleep Phase Syndrome:    Try to only be in bed between 1 and 8 AM. You may nap for an hour in the day if you feel it helps, but you may sleep better at night if you avoid napping.    Delayed Sleep Phase Syndrome (DSPS) means that your body's internal timing is set late compared to the 24 hour day. Therefore, it is often difficult to get up on time for work in the morning and sometimes difficult to fall asleep on time, in order to get enough sleep. People with DSPS often tend to like to stay up late on weekends and sleep in until between 10 AM and noon, sometimes even later.This is actually a bad habit that will perpetuate the problem. It reinforces your body's tendency to be on that later schedule.    You should go to bed when you are sleepy and ready to sleep. During this entire process, you should not engage in activities that may make it worse, such as watching TV in bed, leaving the TV on all night, drinking any caffeine 6 hours before bed or exercising 1-2 hours before bed.     Start taking Melatonin, 0.5-1 mg tablet 3-5 hours before the time that you fall asleep on average (not your desired bedtime or time that you get in bed, but the time you normally fall asleep on your own).     Upon awakening, get exposure to sun-light for about 30-45 minutes. You do not need to look at the sun, in fact, this is dangerous. Reading the paper with the sun shining on you is adequate.  Alternatively, you may use a Seasonal Affective Disorder Lamp (intensity 10,000 Lux) instead of the sun. The lamp should be positioned 1-2 arms lengths away from you. They lamps are sold at Home [employer] Companies such as [employer] or [employer]. A prescription can be written to get insurance coverage in some cases. They are also sold on [employer].    Using the light and melatonin should help march your internal clock (known as your circadian rhythms) gradually earlier. As your bedtime advances,  remember to take your melatonin earlier, keeping it 5 hours before your fall asleep time.    Avoid naps and sleeping in because sleeping during the day will delay your body's clock and you will have to start from scratch.     More information about light therapy:  If the cost of any light box is too much, you can also purchase a compact fluorescent all spectrum light bulb at a local hardware store for about $8.  The most commonly available bulb is 1400 lumen.  You would need two of these positioned within 1 meter of yourself to be equivalent to 2,500 lux.  The bulbs can be placed in a standard light fixture.  Additionally, they can be placed in a mountable fixture that is used in greenhouses.  Mountable fixtures are also available at hardware stores for about $9.  Do not look directly at the light.  If you have any concerns regarding the safety of bright light therapy for you, it is recommended that you consult an ophthalmologist before using a light box.  If you have a condition that makes your eyes very sensitive to light, macular degeneration, a family history of such problems, or diabetic changes to your eyes, consult an ophthalmologist before using a light box. If you have anxiety disorder and have an increase in anxiety discontinue use.  General recommendations for sleep problems (Insomnia)  Allow 2-4 weeks to see results     Establish a regular sleep schedule    Most people only need 7-8 hours of sleep.  Don't be in bed longer than you need     to sleep or you will end up spending more time awake in bed. This trains your    brain to think of the bed as a place to not sleep.  Go to bed at same time each night   Get up at same time each day - Set an alarm everyday (even weekends). This is one of    the most important tips. It prevents you from relying on your insomnia to get you    up on time for your day. That actually reinforces insomnia. It also will help your    body get into a pattern where you start  feeling tired at a consistent time each    night.  The body functions best when you keep a consistent routine.  Avoid sleeping-in and napping. Anytime you sleep during the day, you will be less tired at    night. You may be tired enough to fall asleep, but you will wake more in the    middle of the night because you will have met your sleep need before the night is    done.   Cut down time in bed (if not asleep, get up)- Use your bed only for sleep and sex    Anytime you spend time in bed doing activities other than sleep (reading,    watching TV, working, playing on the computer or phone, or even just laying in    bed trying to sleep), you are training  your brain to think of the bed as a place to    do activities other than sleep. If you are not falling asleep within 20-30 minutes,    get out of bed. While out of bed, avoid bright lights. Avoid work or chores. Being    productive in the middle of the night reinforces waking up at night. Find relaxing,    not particularly entertaining activities like reading, listening to music, or relaxation    exercises. Go back to bed if you start feeling groggy, or after about 30 minutes,    even if not feeling very tired. Sometimes, just getting out of bed stops the pattern    of getting frustrated about laying in bed not sleeping, and that can help you fall    asleep.   Avoid trying to force yourself to sleep- sleep is not like everything else. The harder you    work at most things, the more you can accomplish. The harder you work at    sleep, the less you will sleep.     Make the bedroom comfortable - quiet, dark and cool are better. Consider ear plugs    (silicon). Use dark blinds or wear an eye mask if needed     Make a relaxing routine prior to bedtime  Relaxation exercises:   Progressive muscle relaxation: Relax each muscle group individually    Begin with your feet, flex, then relax. Try to imagine your feet feeling heavy and sinking into the bed. Move to your calves,  do the same thing. Work through each muscle group toward your head.    Relaxing Mental Imagery: Try to imagine a trip that you took and found relaxing, or imagine a day at the beach. Try to walk yourself through the day in your mind as if you were dreaming it. Try to imagine sensing the different experiences, such as feeling sand between your toes, the heat of the sun on your skin, seeing the waves crashing the shore, the smell of the salt water, etc.     Deal with your worries before bedtime    Set aside a worry time around dinner time for 10-15 minutes. Write down the    things that are on your mind. Plan time in the coming days to address those    issues. Brainstorm ideas on how you will deal with them. Try to identify issues    that are out of your control, and try to let those issues go.  Listen to relaxation tapes   Classical Music or Nature sounds   Back Massage   Get regular exercise each day (at least 1-2 hours before bedtime)   Take medications only as directed   Eat a light bedtime snack or warm drink   Warm milk   Warm herbal tea (non-caffeinated)       Things to avoid   No overstimulating activities just before bed   No competitive games before bedtime   No exciting television programs before bedtime   Avoid caffeine after lunchtime   Avoid chocolate   Do not use alcohol to induce sleep (worsens Insomnia)   Do not take someone else's sleeping pills   Do not look at the clock when awakening   Do not turn on light when getting up to use bathroom, use a nightlight     Online Programs     www.SHUTi.me (pronounced shut eye). There is a fee for this program. Enter the code  [place]  if you decide to enroll in this program.      www.sleepIO.com (pronounced sleep ee oh). There is a fee for this program. Enter the code  [place]  if you decide to enroll in this program.     Suggested Resources  Insomnia Treatment Books     Overcoming Insomnia by Jacob Solano and oPrtia Samson (2008)    No More Sleepless  Nights by Mario Collado and Fatou Sanches (1996)    Say Eddie to Insomnia by Joel Rodgers (2009)    The Insomnia Workbook by Avelina Watts and Devin Coppola (2009)    The Insomnia Answer by Rad Nixon and Julio Drake (2006)      Stress Management and Relaxation Books    The Relaxation and Stress Reduction Workbook by Alisha May, Emily Hutchinson and Regino Kim (2008)    Stress Management Workbook: Techniques and Self-Assessment Procedures by Ramona Avery and John Longoria (1997)    A Mindfulness-Based Stress Reduction Workbook by Vikram Lang and Alexus Ryan (2010)    The Complete Stress Management Workbook by Doroteo Roberts, Placido Nava and Morgan Underwood (1996)    Assert Yourself by Bernice Barry and Ravi Barry (1977)    Relaxation Resources for Computer Download   These websites offer resources to help you relax. This list is for information only. [facility] is not responsible for the quality of services or the actions of any person or organization.  Progressive Muscle Relaxation (PMR):     http://www."[employer]/progressive-muscle-relaxation-exercise.html     http://studentsupport.[facility]/counseling/resources/self-help/relaxation-and-stress-management/   Deep Breathing Exercises:    http://www."[employer]/breathing-awareness.html     Meditation:     www.[employer]    www.theTMguidedTMmeditation-site.com You may have to pay for some of these resources.    Guided Imagery:    http://www."[employer]/guided-imagery-scripts.html     http://[employer]!/library/cdqkmanxpy-ofwrar-nmxbgjk/     Counseling / Behavioral Health  [facility]  Visit www.[email or web address].org or call [phone] to find a clinic close to you.  Or call [phone] for [facility].    Your BMI is Body mass index is 37.97 kg/(m^2).  Weight management is a personal decision.  If you are interested in  exploring weight loss strategies, the following discussion covers the approaches that may be successful. Body mass index (BMI) is one way to tell whether you are at a healthy weight, overweight, or obese. It measures your weight in relation to your height.  A BMI of 18.5 to 24.9 is in the healthy range. A person with a BMI of 25 to 29.9 is considered overweight, and someone with a BMI of 30 or greater is considered obese. More than two-thirds of American adults are considered overweight or obese.  Being overweight or obese increases the risk for further weight gain. Excess weight may lead to heart disease and diabetes.  Creating and following plans for healthy eating and physical activity may help you improve your health.  Weight control is part of healthy lifestyle and includes exercise, emotional health, and healthy eating habits. Careful eating habits lifelong are the mainstay of weight control. Though there are significant health benefits from weight loss, long-term weight loss with diet alone may be very difficult to achieve- studies show long-term success with dietary management in less than 10% of people. Attaining a healthy weight may be especially difficult to achieve in those with severe obesity. In some cases, medications, devices and surgical management might be considered.  What can you do?  If you are overweight or obese and are interested in methods for weight loss, you should discuss this with your provider.     Consider reducing daily calorie intake by 500 calories.     Keep a food journal.     Avoiding skipping meals, consider cutting portions instead.    Diet combined with exercise helps maintain muscle while optimizing fat loss. Strength training is particularly important for building and maintaining muscle mass. Exercise helps reduce stress, increase energy, and improves fitness. Increasing exercise without diet control, however, may not burn enough calories to loose weight.       Start walking  three days a week 10-20 minutes at a time    Work towards walking thirty minutes five days a week     Eventually, increase the speed of your walking for 1-2 minutes at time    In addition, we recommend that you review healthy lifestyles and methods for weight loss available through the National Institutes of Health patient information sites:  http://win.niddk.nih.gov/publications/index.htm    And look into health and wellness programs that may be available through your health insurance provider, employer, local community center, or ronald club.    Weight management plan: Patient was referred to their PCP to discuss a diet and exercise plan.

## 2017-09-29 ENCOUNTER — MYC MEDICAL ADVICE (OUTPATIENT)
Dept: FAMILY MEDICINE | Facility: CLINIC | Age: 62
End: 2017-09-29

## 2017-09-29 ENCOUNTER — OFFICE VISIT (OUTPATIENT)
Dept: FAMILY MEDICINE | Facility: CLINIC | Age: 62
End: 2017-09-29
Payer: COMMERCIAL

## 2017-09-29 VITALS
OXYGEN SATURATION: 99 % | WEIGHT: 240.1 LBS | BODY MASS INDEX: 37.69 KG/M2 | DIASTOLIC BLOOD PRESSURE: 71 MMHG | HEIGHT: 67 IN | SYSTOLIC BLOOD PRESSURE: 102 MMHG | TEMPERATURE: 97.8 F | HEART RATE: 78 BPM

## 2017-09-29 DIAGNOSIS — R19.7 DIARRHEA, UNSPECIFIED TYPE: ICD-10-CM

## 2017-09-29 DIAGNOSIS — B35.4 TINEA CORPORIS: Primary | ICD-10-CM

## 2017-09-29 DIAGNOSIS — E11.9 TYPE 2 DIABETES MELLITUS WITHOUT COMPLICATION, WITHOUT LONG-TERM CURRENT USE OF INSULIN (H): ICD-10-CM

## 2017-09-29 LAB
KOH PREP SPEC: ABNORMAL
SPECIMEN SOURCE: ABNORMAL

## 2017-09-29 PROCEDURE — 99214 OFFICE O/P EST MOD 30 MIN: CPT | Performed by: FAMILY MEDICINE

## 2017-09-29 PROCEDURE — 87210 SMEAR WET MOUNT SALINE/INK: CPT | Performed by: FAMILY MEDICINE

## 2017-09-29 RX ORDER — KETOCONAZOLE 20 MG/ML
SHAMPOO TOPICAL
Qty: 120 ML | Refills: 1 | Status: SHIPPED | OUTPATIENT
Start: 2017-09-29 | End: 2017-10-31

## 2017-09-29 NOTE — PROGRESS NOTES
"  SUBJECTIVE:   Parmjit Hernández is a 62 year old male who presents to clinic today for the following health issues:    Rash on face is worse.  Has used TMC- not improving  Developed diarrhea , watery explosive, a=no associated abdomen pain   He reports that the diarrhea has no association with starting Glucophage and he has tolerated Glucophage ok  Diarrhea      Duration: x2 weeks    Description:       Consistency of stool: watery and explosive       Blood in stool: no        Number of loose stools past 24 hours: 4    Intensity:  severe    Accompanying signs and symptoms:       Fever: no        Nausea/vomitting: no        Abdominal pain: no        Weight loss: no     History (recent antibiotics or travel/ill contacts/med changes/testing done): med changes     Precipitating or alleviating factors: None    Therapies tried and outcome: Imodium AD - not helping with symptoms     Historically treated- tincture of opium  He reports these bouts of diarrhea, happen at the least annual occurrence- 4 days of  diarrhea ,Osei treated with -tincture of opium- worked and nothing else  Collected samples,no parasites ever found- negative test n 2006  Was in remission- for last couple years  Had Gastroenterology consults as well and ,analyzed- over analyzed eating over year  No association found eating or not eating food  doest not think food consumption has no correlation with diarrhea    PROBLEMS TO ADD ON...    Problem list and histories reviewed & adjusted, as indicated.  Additional history: as documented    Labs reviewed in EPIC    Reviewed and updated as needed this visit by clinical staffAllergies  Meds       Reviewed and updated as needed this visit by Provider         ROS:  Constitutional, HEENT, cardiovascular, pulmonary, gi and gu systems are negative, except as otherwise noted.      OBJECTIVE:   /71  Pulse 78  Temp 97.8  F (36.6  C) (Oral)  Ht 5' 7\" (1.702 m)  Wt 240 lb 1.6 oz (108.9 kg)  SpO2 99%  BMI " 37.6 kg/m2  Body mass index is 37.6 kg/(m^2).  GENERAL: healthy, alert and no distress  Face: erythematous scaly rash- involving the full face, mainly forehead nasal bridges, chin, it does extend into the scalp as well. And significant flakes and scabs on the scalp as well NECK: no adenopathy, no asymmetry, masses, or scars and thyroid normal to palpation  RESP: lungs clear to auscultation - no rales, rhonchi or wheezes  CV: regular rate and rhythm, normal S1 S2, no S3 or S4, no murmur, click or rub, no peripheral edema and peripheral pulses strong  ABDOMEN: soft, nontender, no hepatosplenomegaly, no masses and bowel sounds normal      ASSESSMENT/PLAN:   (B35.4) Tinea corporis  (primary encounter diagnosis)  Plan: KOH prep positive for yeast  Start ketoconazole (NIZORAL) 2 % shampoo, apply for 5 mons, leave on and then wash off. 2-3 a week. If no improvement see DERMATOLOGY      REFERRAL, KOH prep (Other than skin, nails,       hair)            (R19.7) Diarrhea, unspecified type  Comment: Plan: Enteric Bacteria and Virus Panel by [facility] Stool,         Ova and Parasite Exam Routine            (E11.69) Type 2 diabetes mellitus with other specified complication (H)  Comment: Plan: well controlled - continue with Glucophage 1000 mg once daily with dinner   Lab Results   Component Value Date    A1C 7.3 08/30/2017    A1C 4.8 05/03/2017    A1C 7.0 10/04/2016    A1C 6.0 07/14/2016    A1C 7.4 02/12/2016       The patient indicates understanding of these issues and agrees with the plan.                    Maribeth Ardon MD  [facility]

## 2017-09-29 NOTE — NURSING NOTE
"Chief Complaint   Patient presents with     Diarrhea       Initial /71  Pulse 78  Temp 97.8  F (36.6  C) (Oral)  Ht 5' 7\" (1.702 m)  Wt 240 lb 1.6 oz (108.9 kg)  SpO2 99%  BMI 37.6 kg/m2 Estimated body mass index is 37.6 kg/(m^2) as calculated from the following:    Height as of this encounter: 5' 7\" (1.702 m).    Weight as of this encounter: 240 lb 1.6 oz (108.9 kg).  Medication Reconciliation: complete      Health Maintenance that is potentially due pending provider review:  NONE    n/a    BAR Mcfarland  "

## 2017-09-29 NOTE — MR AVS SNAPSHOT
After Visit Summary   9/29/2017    Parmjit Hernández    MRN: [id number]           Patient Information     Date Of Birth          1955        Visit Information        Provider Department      9/29/2017 2:15 PM Maribeth Ardon MD [facility]        Today's Diagnoses     Tinea corporis    -  1    Diarrhea, unspecified type        Type 2 diabetes mellitus with other specified complication (H)           Follow-ups after your visit        Additional Services     DERMATOLOGY REFERRAL       Your provider has referred you to: [facility]: [facility] [place] [phone]   [email or web address]    Please be aware that coverage of these services is subject to the terms and limitations of your health insurance plan.  Call member services at your health plan with any benefit or coverage questions.      Please bring the following with you to your appointment:    (1) Any X-Rays, CTs or MRIs which have been performed.  Contact the facility where they were done to arrange for  prior to your scheduled appointment.    (2) List of current medications  (3) This referral request   (4) Any documents/labs given to you for this referral                  Your next 10 appointments already scheduled     Dec 20, 2017 10:30 AM CST   Return Sleep Patient with Bennett Ezra Goltz, PA-C   [facility] ([facility])    [address] [place] [id number] [phone]              Who to contact     If you have questions or need follow up information about today's clinic visit or your schedule please contact [facility] directly at [phone].  Normal or non-critical lab and imaging results will be communicated to you by MyChart, letter or phone within 4 business days after the clinic has received the results. If you do not hear from us within 7 days, please contact the clinic through [facility]  "or phone. If you have a critical or abnormal lab result, we will notify you by phone as soon as possible.  Submit refill requests through [employer] or call your pharmacy and they will forward the refill request to us. Please allow 3 business days for your refill to be completed.          Additional Information About Your Visit        [email or web address] Information     [employer] gives you secure access to your electronic health record. If you see a primary care provider, you can also send messages to your care team and make appointments. If you have questions, please call your primary care clinic.  If you do not have a primary care provider, please call [phone] and they will assist you.        Care EveryWhere ID     This is your Care EveryWhere ID. This could be used by other organizations to access your [facility] medical records  [id number]        Your Vitals Were     Pulse Temperature Height Pulse Oximetry BMI (Body Mass Index)       78 97.8  F (36.6  C) (Oral) 5' 7\" (1.702 m) 99% 37.6 kg/m2        Blood Pressure from Last 3 Encounters:   09/29/17 102/71   09/26/17 111/57   09/22/17 (!) 141/96    Weight from Last 3 Encounters:   09/29/17 240 lb 1.6 oz (108.9 kg)   09/26/17 242 lb 6.4 oz (110 kg)   09/22/17 240 lb 11.2 oz (109.2 kg)              We Performed the Following     DERMATOLOGY REFERRAL          Today's Medication Changes          These changes are accurate as of: 9/29/17  3:10 PM.  If you have any questions, ask your nurse or doctor.               These medicines have changed or have updated prescriptions.        Dose/Directions    * ketoconazole 2 % cream   Commonly known as:  NIZORAL   This may have changed:  Another medication with the same name was added. Make sure you understand how and when to take each.   Used for:  Seborrheic dermatitis   Changed by:  Maribeth Ardon MD        Apply topically 2 times daily   Quantity:  60 g   Refills:  1       * ketoconazole 2 % shampoo   Commonly known as:  NIZORAL "   This may have changed:  You were already taking a medication with the same name, and this prescription was added. Make sure you understand how and when to take each.   Used for:  Tinea corporis   Changed by:  Maribeth Ardon MD        Apply to the affected area and wash off after 5 minutes.   Quantity:  120 mL   Refills:  1       * Notice:  This list has 2 medication(s) that are the same as other medications prescribed for you. Read the directions carefully, and ask your doctor or other care provider to review them with you.         Where to get your medicines      These medications were sent to [facility] [address] [place] [address] [address] [address]     Phone:  [phone]     ketoconazole 2 % shampoo                Primary Care Provider Office Phone # Fax #    Maribeth Ardon [phone] [address] [facility] [address]        Equal Access to Services     : Hadii aad ku hadasho Soomaali, waaxda luqadaha, qaybta kaalmada adeegyada, waxay idiin hayariana mckenzie . So [facility] [phone].    ATENCIÓN: Si habla español, tiene a agudelo disposición servicios gratuitos de asistencia lingüística. [facility] [phone].    We comply with applicable federal civil rights laws and [place] laws. We do not discriminate on the basis of race, color, national origin, age, disability, sex, sexual orientation, or gender identity.            Thank you!     Thank you for choosing [facility]  for your care. Our goal is always to provide you with excellent care. Hearing back from our patients is one way we can continue to improve our services. Please take a few minutes to complete the written survey that you may receive in the mail after your visit with us. Thank you!             Your Updated Medication List - Protect others around you: Learn how to safely use, store and throw  away your medicines at www.disposemymeds.org.          This list is accurate as of: 9/29/17  3:10 PM.  Always use your most recent med list.                   Brand Name Dispense Instructions for use Diagnosis    atorvastatin 10 MG tablet    LIPITOR    90 tablet    Take 1 tablet (10 mg) by mouth daily    Hyperlipidemia LDL goal <100       divalproex 500 MG EC tablet    DEPAKOTE    10 tablet    Take 2 tablets (1,000 mg) by mouth daily    Hyperlipidemia LDL goal <100       doxepin 25 MG capsule    SINEquan    10 capsule    Take 2 capsules (50 mg) by mouth At Bedtime    Hyperlipidemia LDL goal <100       furosemide 20 MG tablet    LASIX    90 tablet    Take 1 tablet (20 mg) by mouth 2 times daily    Peripheral edema       gabapentin 800 MG tablet    NEURONTIN    90 tablet    Take 1 tablet (800 mg) by mouth 3 times daily    Chronic abdominal pain       * ketoconazole 2 % cream    NIZORAL    60 g    Apply topically 2 times daily    Seborrheic dermatitis       * ketoconazole 2 % shampoo    NIZORAL    120 mL    Apply to the affected area and wash off after 5 minutes.    Tinea corporis       lisinopril 10 MG tablet    PRINIVIL/ZESTRIL    90 tablet    TAKE 1 TABLET BY MOUTH DAILY    Hypertension goal BP (blood pressure) < 140/90       metFORMIN 500 MG 24 hr tablet    GLUCOPHAGE-XR    180 tablet    Take 2 tablets (1,000 mg) by mouth daily (with dinner)    Type 2 diabetes mellitus without complication, without long-term current use of insulin (H)       OLANZapine 7.5 MG tablet    zyPREXA    5 tablet    Take 1 tablet (7.5 mg) by mouth At Bedtime    Hyperlipidemia LDL goal <100, Generalized edema, Chronic abdominal pain       * order for DME     1 Device    Equipment being ordered: CPAP    Obstructive sleep apnea       * order for DME     2 Box    Equipment being ordered: sharla hose    Peripheral edema       potassium chloride SA 10 MEQ CR tablet    K-DUR/KLOR-CON M    90 tablet    Take 1 tablet (10 mEq) by mouth daily     Peripheral edema       pramipexole 0.125 MG tablet    MIRAPEX    30 tablet    Take 1 tablet (0.125 mg) by mouth At Bedtime    Restless legs syndrome (RLS)       SUMAtriptan 5 MG/ACT nasal spray    IMITREX    1 each    Spray 1-2 sprays in nostril as needed for migraine May repeat in 2 hours. Max 8 sprays/24 hours.    Abdominal migraine, intractable       * Notice:  This list has 4 medication(s) that are the same as other medications prescribed for you. Read the directions carefully, and ask your doctor or other care provider to review them with you.

## 2017-10-07 ENCOUNTER — MYC MEDICAL ADVICE (OUTPATIENT)
Dept: FAMILY MEDICINE | Facility: CLINIC | Age: 62
End: 2017-10-07

## 2017-10-09 NOTE — TELEPHONE ENCOUNTER
Minda,     Do you know the information that patient is requesting?    Thanks  Sofi Cade, SHALINI

## 2017-10-10 NOTE — TELEPHONE ENCOUNTER
It sounds like he is having trouble paying his medical bills vs. Pharmacy, so I believe this goes to [facility] at:   Patient Financial Services  Attn: [facility]  [address] [place] [address]    Here's the website - but it may be good to double check with care coordination   [email or web address]    Minda Smith PharmD, TOSHIA, BCACP  [facility] Pharmacist, [facility]

## 2017-10-12 ENCOUNTER — MYC MEDICAL ADVICE (OUTPATIENT)
Dept: FAMILY MEDICINE | Facility: CLINIC | Age: 62
End: 2017-10-12

## 2017-10-13 DIAGNOSIS — R19.7 DIARRHEA, UNSPECIFIED TYPE: ICD-10-CM

## 2017-10-13 PROCEDURE — 87177 OVA AND PARASITES SMEARS: CPT | Performed by: FAMILY MEDICINE

## 2017-10-13 PROCEDURE — 87209 SMEAR COMPLEX STAIN: CPT | Performed by: FAMILY MEDICINE

## 2017-10-13 PROCEDURE — 87506 IADNA-DNA/RNA PROBE TQ 6-11: CPT | Performed by: FAMILY MEDICINE

## 2017-10-16 LAB
O+P STL MICRO: NORMAL
O+P STL MICRO: NORMAL
SPECIMEN SOURCE: NORMAL

## 2017-10-24 DIAGNOSIS — E78.5 HYPERLIPIDEMIA LDL GOAL <100: ICD-10-CM

## 2017-10-25 ENCOUNTER — MYC MEDICAL ADVICE (OUTPATIENT)
Dept: FAMILY MEDICINE | Facility: CLINIC | Age: 62
End: 2017-10-25

## 2017-10-25 DIAGNOSIS — H00.019 HORDEOLUM EXTERNUM, UNSPECIFIED LATERALITY: ICD-10-CM

## 2017-10-25 RX ORDER — ATORVASTATIN CALCIUM 10 MG/1
TABLET, FILM COATED ORAL
Qty: 210 TABLET | Refills: 0 | Status: SHIPPED | OUTPATIENT
Start: 2017-10-25 | End: 2018-12-10

## 2017-10-25 NOTE — TELEPHONE ENCOUNTER
lipitor      Last Written Prescription Date:  6/26/17  Last Fill Quantity: 90,   # refills: 3  Future Office visit:    Next 5 appointments (look out 90 days)     Nov 07, 2017 12:00 PM CST   Office Visit with Ely Mcgrath MD   [facility] ([facility] )    [address] [place] [id number] [phone]                 Requesting #[phone].  Please authorize if appropriate.  Thanks,  Norma Casillas RN

## 2017-10-28 DIAGNOSIS — R60.0 PERIPHERAL EDEMA: ICD-10-CM

## 2017-10-28 DIAGNOSIS — E78.5 HYPERLIPIDEMIA LDL GOAL <100: ICD-10-CM

## 2017-10-28 DIAGNOSIS — G89.29 CHRONIC ABDOMINAL PAIN: ICD-10-CM

## 2017-10-28 DIAGNOSIS — R60.1 GENERALIZED EDEMA: ICD-10-CM

## 2017-10-28 DIAGNOSIS — R10.9 CHRONIC ABDOMINAL PAIN: ICD-10-CM

## 2017-10-29 DIAGNOSIS — R10.9 CHRONIC ABDOMINAL PAIN: ICD-10-CM

## 2017-10-29 DIAGNOSIS — R60.1 GENERALIZED EDEMA: ICD-10-CM

## 2017-10-29 DIAGNOSIS — R60.0 PERIPHERAL EDEMA: ICD-10-CM

## 2017-10-29 DIAGNOSIS — G89.29 CHRONIC ABDOMINAL PAIN: ICD-10-CM

## 2017-10-29 DIAGNOSIS — E78.5 HYPERLIPIDEMIA LDL GOAL <100: ICD-10-CM

## 2017-10-30 DIAGNOSIS — R60.1 GENERALIZED EDEMA: ICD-10-CM

## 2017-10-30 DIAGNOSIS — E78.5 HYPERLIPIDEMIA LDL GOAL <100: ICD-10-CM

## 2017-10-30 DIAGNOSIS — G89.29 CHRONIC ABDOMINAL PAIN: ICD-10-CM

## 2017-10-30 DIAGNOSIS — R10.9 CHRONIC ABDOMINAL PAIN: ICD-10-CM

## 2017-10-30 RX ORDER — OLANZAPINE 7.5 MG/1
TABLET, FILM COATED ORAL
Start: 2017-10-30

## 2017-10-30 RX ORDER — DOXEPIN HYDROCHLORIDE 25 MG/1
CAPSULE ORAL
Start: 2017-10-30

## 2017-10-30 RX ORDER — FUROSEMIDE 20 MG
TABLET ORAL
Start: 2017-10-30

## 2017-10-30 RX ORDER — POTASSIUM CHLORIDE 750 MG/1
TABLET, EXTENDED RELEASE ORAL
Start: 2017-10-30

## 2017-10-30 NOTE — TELEPHONE ENCOUNTER
"K\" has Rx from 9/13/17.  Zyprexa and Sinequan:  Last Rx was from  6/2/17 for few pills.  Only time was prescribed.  Wandy Pena RN    "

## 2017-10-30 NOTE — TELEPHONE ENCOUNTER
Lasix - too soon  Too soon  Wandy Pena RN    Last Written Prescription Date: 9/13/2017  Last Fill Quantity: 90, # refills: 1  Last Office Visit with G, P or [facility] prescribing provider: 9/27/2017  Next 5 appointments (look out 90 days)     Nov 07, 2017 12:00 PM CST   Office Visit with Ely Mcgrath MD   [facility] ([facility] )    [address] [place] [id number] [phone]                   Potassium   Date Value Ref Range Status   09/13/2017 4.7 3.4 - 5.3 mmol/L Final     Creatinine   Date Value Ref Range Status   09/13/2017 1.22 0.66 - 1.25 mg/dL Final     BP Readings from Last 3 Encounters:   09/29/17 102/71   09/26/17 111/57   09/22/17 (!) 141/96

## 2017-10-31 DIAGNOSIS — B35.4 TINEA CORPORIS: ICD-10-CM

## 2017-10-31 RX ORDER — POTASSIUM CHLORIDE 750 MG/1
TABLET, EXTENDED RELEASE ORAL
Refills: 0
Start: 2017-10-31

## 2017-11-01 RX ORDER — KETOCONAZOLE 20 MG/ML
SHAMPOO TOPICAL
Qty: 120 ML | Refills: 2 | Status: SHIPPED | OUTPATIENT
Start: 2017-11-01 | End: 2018-12-21

## 2017-11-06 DIAGNOSIS — G43.D1 ABDOMINAL MIGRAINE, INTRACTABLE: ICD-10-CM

## 2017-11-06 RX ORDER — SUMATRIPTAN 5 MG/1
SPRAY NASAL
Qty: 1 EACH | Refills: 0 | Status: SHIPPED | OUTPATIENT
Start: 2017-11-06 | End: 2018-04-02

## 2017-11-06 NOTE — TELEPHONE ENCOUNTER
AS  Routing refill request to provider for review/approval because:  Drug not on the [facility] refill protocol for Dx: Abdominal migraine.  Please authorize if appropriate.  Thanks, Wandy Pena RN    Sumatriptan  Nasal Spray  Last Written Prescription Date: 8/1/2017  Last Fill Quantity: 1, # refills: 3  Last Office Visit with [facility], [facility] or [facility] prescribing provider: 9/29/2017  Next 5 appointments (look out 90 days)     Nov 07, 2017 12:00 PM CST   Office Visit with Ely Mcgrath MD   [facility] ([facility] )    [address] [place] [address]  [address] [place] [id number] [phone]                   BP Readings from Last 3 Encounters:   09/29/17 102/71   09/26/17 111/57   09/22/17 (!) 141/96

## 2017-11-10 DIAGNOSIS — G25.81 RESTLESS LEGS SYNDROME (RLS): ICD-10-CM

## 2017-11-13 RX ORDER — PRAMIPEXOLE DIHYDROCHLORIDE 0.12 MG/1
TABLET ORAL
Qty: 30 TABLET | Refills: 0 | Status: SHIPPED | OUTPATIENT
Start: 2017-11-13 | End: 2018-01-18

## 2017-11-13 NOTE — TELEPHONE ENCOUNTER
Routing refill request to provider for review/approval because:  Med started at 9/13/2017 OV - no mention of RLS in OVs since  Ok to continue this?  Stacie MAYA RN    Requested Prescriptions   Pending Prescriptions Disp Refills     pramipexole (MIRAPEX) 0.125 MG tablet [Pharmacy Med Name: PRAMIPEXOLE 0.125MG TABLETS] 30 tablet 0     Sig: TAKE 1 TABLET(0.125 MG) BY MOUTH AT BEDTIME    Antiparkinson's Agents Protocol Passed    11/10/2017  3:07 AM       Passed - Blood pressure under 140/90    BP Readings from Last 3 Encounters:   09/29/17 102/71   09/26/17 111/57   09/22/17 (!) 141/96                Passed - Recent or future visit with authorizing provider's specialty    Patient had office visit in the last year or has a visit in the next 30 days with authorizing provider.  See chart review.              Passed - Patient is age 18 or older

## 2018-01-18 ENCOUNTER — HOSPITAL ENCOUNTER (EMERGENCY)
Facility: CLINIC | Age: 63
Discharge: HOME | End: 2018-01-18
Attending: EMERGENCY MEDICINE | Admitting: EMERGENCY MEDICINE
Payer: MEDICARE

## 2018-01-18 ENCOUNTER — APPOINTMENT (OUTPATIENT)
Dept: CT IMAGING | Facility: CLINIC | Age: 63
End: 2018-01-18
Attending: EMERGENCY MEDICINE
Payer: MEDICARE

## 2018-01-18 VITALS
OXYGEN SATURATION: 94 % | RESPIRATION RATE: 16 BRPM | DIASTOLIC BLOOD PRESSURE: 104 MMHG | SYSTOLIC BLOOD PRESSURE: 151 MMHG | TEMPERATURE: 98.1 F

## 2018-01-18 DIAGNOSIS — R73.9 HYPERGLYCEMIA: ICD-10-CM

## 2018-01-18 DIAGNOSIS — R10.11 ABDOMINAL PAIN, RIGHT UPPER QUADRANT: ICD-10-CM

## 2018-01-18 DIAGNOSIS — I16.0 HYPERTENSIVE URGENCY: ICD-10-CM

## 2018-01-18 LAB
ALBUMIN SERPL-MCNC: 4 G/DL (ref 3.4–5)
ALBUMIN UR-MCNC: 10 MG/DL
ALP SERPL-CCNC: 100 U/L (ref 40–150)
ALT SERPL W P-5'-P-CCNC: 37 U/L (ref 0–70)
ANION GAP SERPL CALCULATED.3IONS-SCNC: 13 MMOL/L (ref 3–14)
APPEARANCE UR: CLEAR
AST SERPL W P-5'-P-CCNC: 19 U/L (ref 0–45)
BASOPHILS # BLD AUTO: 0 10E9/L (ref 0–0.2)
BASOPHILS NFR BLD AUTO: 0.2 %
BILIRUB SERPL-MCNC: 1.4 MG/DL (ref 0.2–1.3)
BILIRUB UR QL STRIP: NEGATIVE
BUN SERPL-MCNC: 24 MG/DL (ref 7–30)
CALCIUM SERPL-MCNC: 10.1 MG/DL (ref 8.5–10.1)
CHLORIDE SERPL-SCNC: 94 MMOL/L (ref 94–109)
CO2 SERPL-SCNC: 24 MMOL/L (ref 20–32)
COLOR UR AUTO: ABNORMAL
CREAT BLD-MCNC: 0.9 MG/DL (ref 0.66–1.25)
CREAT SERPL-MCNC: 0.82 MG/DL (ref 0.66–1.25)
DIFFERENTIAL METHOD BLD: ABNORMAL
EOSINOPHIL # BLD AUTO: 0 10E9/L (ref 0–0.7)
EOSINOPHIL NFR BLD AUTO: 0.1 %
ERYTHROCYTE [DISTWIDTH] IN BLOOD BY AUTOMATED COUNT: 14.4 % (ref 10–15)
GFR SERPL CREATININE-BSD FRML MDRD: 86 ML/MIN/1.7M2
GFR SERPL CREATININE-BSD FRML MDRD: >90 ML/MIN/1.7M2
GLUCOSE SERPL-MCNC: 261 MG/DL (ref 70–99)
GLUCOSE UR STRIP-MCNC: >1000 MG/DL
HBA1C MFR BLD: 7.7 % (ref 4.3–6)
HCT VFR BLD AUTO: 37 % (ref 40–53)
HGB BLD-MCNC: 13.8 G/DL (ref 13.3–17.7)
HGB UR QL STRIP: NEGATIVE
IMM GRANULOCYTES # BLD: 0.1 10E9/L (ref 0–0.4)
IMM GRANULOCYTES NFR BLD: 0.5 %
KETONES UR STRIP-MCNC: 5 MG/DL
LACTATE BLD-SCNC: 1.9 MMOL/L (ref 0.7–2)
LEUKOCYTE ESTERASE UR QL STRIP: NEGATIVE
LIPASE SERPL-CCNC: 66 U/L (ref 73–393)
LYMPHOCYTES # BLD AUTO: 1.3 10E9/L (ref 0.8–5.3)
LYMPHOCYTES NFR BLD AUTO: 11.1 %
MCH RBC QN AUTO: 35.4 PG (ref 26.5–33)
MCHC RBC AUTO-ENTMCNC: 37.3 G/DL (ref 31.5–36.5)
MCV RBC AUTO: 95 FL (ref 78–100)
MONOCYTES # BLD AUTO: 0.8 10E9/L (ref 0–1.3)
MONOCYTES NFR BLD AUTO: 6.7 %
NEUTROPHILS # BLD AUTO: 9.8 10E9/L (ref 1.6–8.3)
NEUTROPHILS NFR BLD AUTO: 81.4 %
NITRATE UR QL: NEGATIVE
NRBC # BLD AUTO: 0 10*3/UL
NRBC BLD AUTO-RTO: 0 /100
PH UR STRIP: 6.5 PH (ref 5–7)
PLATELET # BLD AUTO: 158 10E9/L (ref 150–450)
POTASSIUM SERPL-SCNC: 3.7 MMOL/L (ref 3.4–5.3)
PROT SERPL-MCNC: 7.7 G/DL (ref 6.8–8.8)
RBC # BLD AUTO: 3.9 10E12/L (ref 4.4–5.9)
RBC #/AREA URNS AUTO: <1 /HPF (ref 0–2)
SODIUM SERPL-SCNC: 131 MMOL/L (ref 133–144)
SOURCE: ABNORMAL
SP GR UR STRIP: 1.02 (ref 1–1.03)
UROBILINOGEN UR STRIP-MCNC: NORMAL MG/DL (ref 0–2)
WBC # BLD AUTO: 12.1 10E9/L (ref 4–11)
WBC #/AREA URNS AUTO: 0 /HPF (ref 0–2)

## 2018-01-18 PROCEDURE — 81001 URINALYSIS AUTO W/SCOPE: CPT | Performed by: EMERGENCY MEDICINE

## 2018-01-18 PROCEDURE — 99285 EMERGENCY DEPT VISIT HI MDM: CPT | Mod: 25 | Performed by: EMERGENCY MEDICINE

## 2018-01-18 PROCEDURE — 83690 ASSAY OF LIPASE: CPT | Performed by: EMERGENCY MEDICINE

## 2018-01-18 PROCEDURE — 83605 ASSAY OF LACTIC ACID: CPT | Performed by: EMERGENCY MEDICINE

## 2018-01-18 PROCEDURE — 96361 HYDRATE IV INFUSION ADD-ON: CPT | Performed by: EMERGENCY MEDICINE

## 2018-01-18 PROCEDURE — 96375 TX/PRO/DX INJ NEW DRUG ADDON: CPT | Performed by: EMERGENCY MEDICINE

## 2018-01-18 PROCEDURE — 99285 EMERGENCY DEPT VISIT HI MDM: CPT | Mod: Z6 | Performed by: EMERGENCY MEDICINE

## 2018-01-18 PROCEDURE — 83036 HEMOGLOBIN GLYCOSYLATED A1C: CPT | Performed by: EMERGENCY MEDICINE

## 2018-01-18 PROCEDURE — 96374 THER/PROPH/DIAG INJ IV PUSH: CPT | Mod: 59 | Performed by: EMERGENCY MEDICINE

## 2018-01-18 PROCEDURE — 25000128 H RX IP 250 OP 636: Performed by: EMERGENCY MEDICINE

## 2018-01-18 PROCEDURE — 25000132 ZZH RX MED GY IP 250 OP 250 PS 637: Mod: GY | Performed by: EMERGENCY MEDICINE

## 2018-01-18 PROCEDURE — 96376 TX/PRO/DX INJ SAME DRUG ADON: CPT | Performed by: EMERGENCY MEDICINE

## 2018-01-18 PROCEDURE — A9270 NON-COVERED ITEM OR SERVICE: HCPCS | Mod: GY | Performed by: EMERGENCY MEDICINE

## 2018-01-18 PROCEDURE — 80053 COMPREHEN METABOLIC PANEL: CPT | Performed by: EMERGENCY MEDICINE

## 2018-01-18 PROCEDURE — 74177 CT ABD & PELVIS W/CONTRAST: CPT

## 2018-01-18 PROCEDURE — 82565 ASSAY OF CREATININE: CPT

## 2018-01-18 PROCEDURE — 25000125 ZZHC RX 250: Performed by: EMERGENCY MEDICINE

## 2018-01-18 PROCEDURE — 85025 COMPLETE CBC W/AUTO DIFF WBC: CPT | Performed by: EMERGENCY MEDICINE

## 2018-01-18 RX ORDER — LISINOPRIL 10 MG/1
10 TABLET ORAL DAILY
Status: DISCONTINUED | OUTPATIENT
Start: 2018-01-18 | End: 2018-01-18 | Stop reason: HOSPADM

## 2018-01-18 RX ORDER — IOPAMIDOL 755 MG/ML
100 INJECTION, SOLUTION INTRAVASCULAR ONCE
Status: COMPLETED | OUTPATIENT
Start: 2018-01-18 | End: 2018-01-18

## 2018-01-18 RX ORDER — HYDROMORPHONE HYDROCHLORIDE 1 MG/ML
0.5 INJECTION, SOLUTION INTRAMUSCULAR; INTRAVENOUS; SUBCUTANEOUS ONCE
Status: COMPLETED | OUTPATIENT
Start: 2018-01-18 | End: 2018-01-18

## 2018-01-18 RX ORDER — LABETALOL HYDROCHLORIDE 5 MG/ML
10 INJECTION, SOLUTION INTRAVENOUS ONCE
Status: COMPLETED | OUTPATIENT
Start: 2018-01-18 | End: 2018-01-18

## 2018-01-18 RX ADMIN — Medication 0.5 MG: at 10:25

## 2018-01-18 RX ADMIN — Medication 0.5 MG: at 08:58

## 2018-01-18 RX ADMIN — Medication 0.5 MG: at 10:37

## 2018-01-18 RX ADMIN — SODIUM CHLORIDE 69 ML: 9 INJECTION, SOLUTION INTRAVENOUS at 09:32

## 2018-01-18 RX ADMIN — Medication 10 MG: at 11:01

## 2018-01-18 RX ADMIN — HYDROMORPHONE HYDROCHLORIDE 1 MG: 1 INJECTION, SOLUTION INTRAMUSCULAR; INTRAVENOUS; SUBCUTANEOUS at 11:26

## 2018-01-18 RX ADMIN — HYDROMORPHONE HYDROCHLORIDE 1 MG: 1 INJECTION, SOLUTION INTRAMUSCULAR; INTRAVENOUS; SUBCUTANEOUS at 12:25

## 2018-01-18 RX ADMIN — IOPAMIDOL 100 ML: 755 INJECTION, SOLUTION INTRAVENOUS at 09:32

## 2018-01-18 RX ADMIN — SODIUM CHLORIDE 1000 ML: 9 INJECTION, SOLUTION INTRAVENOUS at 08:57

## 2018-01-18 RX ADMIN — LISINOPRIL 10 MG: 10 TABLET ORAL at 11:24

## 2018-01-18 ASSESSMENT — ENCOUNTER SYMPTOMS
EYES NEGATIVE: 1
FEVER: 0
ABDOMINAL PAIN: 1
NAUSEA: 1
HEADACHES: 0
PSYCHIATRIC NEGATIVE: 1
NECK PAIN: 0
VOMITING: 0
SHORTNESS OF BREATH: 0

## 2018-01-18 NOTE — ED NOTES
Pt has chronic abdominal pain that comes and goes, has never had a definitive diagnosis. Pain worse over the last 3 days. Describes as a constant, intolerable, RUQ burning. Tried taking Tums which made the pain worse. No other pain meds. Denies N/V/D. Denies fevers, body aches, or chills.

## 2018-01-18 NOTE — ED NOTES
Bed: ED09  Expected date: 1/18/18  Expected time: 7:54 AM  Means of arrival:   Comments:  N 734  60yo M abd pain

## 2018-01-18 NOTE — ED AVS SNAPSHOT
[facility], [place], Emergency Department    [address]    [facility] [address]    Phone:  [phone]    Fax:  [phone]                                       Parmjit Hernández   MRN: [id number]    Department:  [facility], Emergency Department   Date of Visit:  1/18/2018           After Visit Summary Signature Page     I have received my discharge instructions, and my questions have been answered. I have discussed any challenges I see with this plan with the nurse or doctor.    ..........................................................................................................................................  Patient/Patient Representative Signature      ..........................................................................................................................................  Patient Representative Print Name and Relationship to Patient    ..................................................               ................................................  Date                                            Time    ..........................................................................................................................................  Reviewed by Signature/Title    ...................................................              ..............................................  Date                                                            Time

## 2018-01-18 NOTE — ED PROVIDER NOTES
"  History     Chief Complaint   Patient presents with     Abdominal Pain     HPI  Parmjit Hernández is a 62 year old male with a past medical history of chronic abdominal pain and narcotic dependence who presents with worsening stomach pain.  The patient states he has periodic stomach pain that comes and goes and has done so for years.  No one has been able to diagnose it.  He states this current bout started about 3 days ago.  It is the worst he has ever had and he states \"I want a break from the pain.\"  His pain is in the right upper quadrant and he describes it as a burning pain that is constant.  It is not affected by eating, although his appetite is decreased due to the pain.  He has been trying Tums, and nothing else, for the pain which has not helped.  The patient has not used ibuprofen or Tylenol, stating these medications do not work for him.  He states Dilaudid is the only medication that works for him.  He is status post cholecystectomy.  He states he is nauseated, but has no vomiting or diarrhea.  He denies fevers or chest pain. He has eaten only crackers recently due to pain.     I have reviewed the Medications, Allergies, Past Medical and Surgical History, and Social History in the Epic system.    Review of Systems   Constitutional: Negative for fever.   Eyes: Negative.    Respiratory: Negative for shortness of breath.    Cardiovascular: Negative for chest pain.   Gastrointestinal: Positive for abdominal pain and nausea. Negative for vomiting.   Genitourinary: Negative.    Musculoskeletal: Negative for neck pain.   Skin: Positive for rash.   Neurological: Negative for headaches.   Psychiatric/Behavioral: Negative.    All other systems reviewed and are negative.      Physical Exam   BP: (!) 182/128  Heart Rate: 109  Temp: 98.1  F (36.7  C)  Resp: 16  SpO2: 90 %      Physical Exam  Physical Exam   Constitutional:   well nourished, well developed, lying flat on his back, appears uncomfortable, flushed  HENT: "   Head: Normocephalic and atraumatic.   Facial flushing  Eyes: Conjunctivae are normal. Pupils are equal, round, and reactive to light.    pharynx has no erythema or exudate, mucous membranes are dry  Neck:   no adenopathy, no bony tenderness  Cardiovascular: regular rate and rhythm without murmurs or gallops  Pulmonary/Chest: Clear to auscultation bilaterally, with no wheezes or retractions. No respiratory distress.  GI: Soft with good bowel sounds.  Obese, tender RUQ, non-distended, with no guarding, no rebound, no peritoneal signs.   Back:  No bony or CVA tenderness   Musculoskeletal:  no edema or clubbing   Skin: Skin is warm and dry.  Fine erythematous rash to the upper back  Neurological: alert and oriented to person, place, and time. Nonfocal exam  Psychiatric:  normal mood and affect.   ED Course     ED Course     Procedures             Critical Care time:  none            Results for orders placed or performed during the hospital encounter of 01/18/18 (from the past 24 hour(s))   Creatinine POCT   Result Value Ref Range    Creatinine 0.9 0.66 - 1.25 mg/dL    GFR Estimate 86 >60 mL/min/1.7m2    GFR Estimate If Black >90 >60 mL/min/1.7m2   CBC with platelets differential   Result Value Ref Range    WBC 12.1 (H) 4.0 - 11.0 10e9/L    RBC Count 3.90 (L) 4.4 - 5.9 10e12/L    Hemoglobin 13.8 13.3 - 17.7 g/dL    Hematocrit 37.0 (L) 40.0 - 53.0 %    MCV 95 78 - 100 fl    MCH 35.4 (H) 26.5 - 33.0 pg    MCHC 37.3 (H) 31.5 - 36.5 g/dL    RDW 14.4 10.0 - 15.0 %    Platelet Count 158 150 - 450 10e9/L    Diff Method Automated Method     % Neutrophils 81.4 %    % Lymphocytes 11.1 %    % Monocytes 6.7 %    % Eosinophils 0.1 %    % Basophils 0.2 %    % Immature Granulocytes 0.5 %    Nucleated RBCs 0 0 /100    Absolute Neutrophil 9.8 (H) 1.6 - 8.3 10e9/L    Absolute Lymphocytes 1.3 0.8 - 5.3 10e9/L    Absolute Monocytes 0.8 0.0 - 1.3 10e9/L    Absolute Eosinophils 0.0 0.0 - 0.7 10e9/L    Absolute Basophils 0.0 0.0 - 0.2  10e9/L    Abs Immature Granulocytes 0.1 0 - 0.4 10e9/L    Absolute Nucleated RBC 0.0    Comprehensive metabolic panel   Result Value Ref Range    Sodium 131 (L) 133 - 144 mmol/L    Potassium 3.7 3.4 - 5.3 mmol/L    Chloride 94 94 - 109 mmol/L    Carbon Dioxide 24 20 - 32 mmol/L    Anion Gap 13 3 - 14 mmol/L    Glucose 261 (H) 70 - 99 mg/dL    Urea Nitrogen 24 7 - 30 mg/dL    Creatinine 0.82 0.66 - 1.25 mg/dL    GFR Estimate >90 >60 mL/min/1.7m2    GFR Estimate If Black >90 >60 mL/min/1.7m2    Calcium 10.1 8.5 - 10.1 mg/dL    Bilirubin Total 1.4 (H) 0.2 - 1.3 mg/dL    Albumin 4.0 3.4 - 5.0 g/dL    Protein Total 7.7 6.8 - 8.8 g/dL    Alkaline Phosphatase 100 40 - 150 U/L    ALT 37 0 - 70 U/L    AST 19 0 - 45 U/L   Lipase   Result Value Ref Range    Lipase 66 (L) 73 - 393 U/L   Lactic acid whole blood   Result Value Ref Range    Lactic Acid 1.9 0.7 - 2.0 mmol/L   Hemoglobin A1c   Result Value Ref Range    Hemoglobin A1C 7.7 (H) 4.3 - 6.0 %   CT Abdomen Pelvis w Contrast    Narrative    CT ABDOMEN AND PELVIS WITH CONTRAST   1/18/2018 9:45 AM     HISTORY: Right upper quadrant abdominal pain, (no gallbladder).     TECHNIQUE:   100 mL Isovue 370. Radiation dose for this scan was  reduced using automated exposure control, adjustment of the mA and/or  kV according to patient size, or iterative reconstruction technique.    COMPARISON: CT abdomen and pelvis 2/1/2017.    FINDINGS:   Liver: Diffusely decreased attenuation of the liver compared to the  spleen suggestive of hepatic steatosis. No abnormal masses. No  intrahepatic bile duct dilatation.    Gallbladder: Gallbladder is absent with cholecystectomy clips present.      Spleen: Spleen is enlarged measuring 16.4 x 9.8 x 13.1 cm.    Pancreas: No mass is identified. Pancreatic duct is not enlarged.    Adrenal glands: No abnormal masses.    Kidneys: No suspicious renal masses. Simple-appearing left renal cyst.  No renal stones. No hydronephrosis.    Bowel: No dilated loops of  bowel to suggest bowel obstruction. No  focal bowel wall thickening. Minimal colonic diverticula without  evidence of diverticulitis. Appendix appears normal.    Lymph nodes: There are prominent mesenteric and retroperitoneal lymph  nodes which overall do not appear to be significantly changed since  prior. Prominent lymph nodes are seen in the hepatic hilum.    Peritoneum: No peritoneal masses are identified. No free fluid in the  abdomen or pelvis.    Abdominal wall: No hernia identified.    Bones: Degenerative changes at L4-L5. No suspicious osseous lesions.    Visualized lung bases: Clear.      Impression    IMPRESSION:   1. No acute abnormalities in the abdomen or pelvis to account for the  patient's symptoms.  2. Hepatic steatosis.  3. Splenomegaly.  4. Nonspecific mild mesenteric and retroperitoneal adenopathy, not  significantly changed.    DARIAN BROOKS MD   UA reflex to Microscopic and Culture   Result Value Ref Range    Color Urine Light Yellow     Appearance Urine Clear     Glucose Urine >1000 (A) NEG^Negative mg/dL    Bilirubin Urine Negative NEG^Negative    Ketones Urine 5 (A) NEG^Negative mg/dL    Specific Gravity Urine 1.021 1.003 - 1.035    Blood Urine Negative NEG^Negative    pH Urine 6.5 5.0 - 7.0 pH    Protein Albumin Urine 10 (A) NEG^Negative mg/dL    Urobilinogen mg/dL Normal 0.0 - 2.0 mg/dL    Nitrite Urine Negative NEG^Negative    Leukocyte Esterase Urine Negative NEG^Negative    Source Midstream Urine     RBC Urine <1 0 - 2 /HPF    WBC Urine 0 0 - 2 /HPF        Labs Ordered and Resulted from Time of ED Arrival Up to the Time of Departure from the ED   CBC WITH PLATELETS DIFFERENTIAL - Abnormal; Notable for the following:        Result Value    WBC 12.1 (*)     RBC Count 3.90 (*)     Hematocrit 37.0 (*)     MCH 35.4 (*)     MCHC 37.3 (*)     Absolute Neutrophil 9.8 (*)     All other components within normal limits   COMPREHENSIVE METABOLIC PANEL - Abnormal; Notable for the following:      Sodium 131 (*)     Glucose 261 (*)     Bilirubin Total 1.4 (*)     All other components within normal limits   LIPASE - Abnormal; Notable for the following:     Lipase 66 (*)     All other components within normal limits   UA MACROSCOPIC WITH REFLEX TO MICRO AND CULTURE - Abnormal; Notable for the following:     Glucose Urine >1000 (*)     Ketones Urine 5 (*)     Protein Albumin Urine 10 (*)     All other components within normal limits   HEMOGLOBIN A1C - Abnormal; Notable for the following:     Hemoglobin A1C 7.7 (*)     All other components within normal limits   LACTIC ACID WHOLE BLOOD   CREATININE POCT   ISTAT CREATININE NURSING POCT            Assessments & Plan (with Medical Decision Making)       I have reviewed the nursing notes.  Emergency Department course:  The patient was seen and examined at 0819 am.  I treated the patient with normal saline IV, Dilaudid IV for repeat doses, and Zofran IV.  He does appear quite uncomfortable and is hypertensive here.  Laboratory studies show a leukocytosis, with a WBC of 12.1.  Comprehensive metabolic panel shows hyponatremia, with a sodium of 131, hyperglycemia, with a glucose of 261 and an elevated total bilirubin of 1.4.  Creatinine is within normal limits at 0.82 and GFR greater than 90.  Lipase is low at 66.  Lactate is within normal limits at 1.9. UA has glucosuria and 5 ketones.     I elected to obtain an abdominal and pelvic CT scan with IV contrast to further evaluate the patient's abdominal pain.  CT shows 1. No acute abnormalities in the abdomen or pelvis to account for the  patient's symptoms.  2. Hepatic steatosis.  3. Splenomegaly.  4. Nonspecific mild mesenteric and retroperitoneal adenopathy, not  significantly changed.    The etiology of the patient's abdominal pain is unclear.  He does have an elevated total bilirubin of 1.4, but this is improved from  prior studies.  His glucose is also somewhat high today.  I added on hemoglobin A1c.  The patient has  a GI physician, Dr. Parekh, at an outside facility.  His regular physician is at [facility]. I treated him with an additional dose of Dilaudid IV prior to discharge, and then later with a full milligram IV Dilaudid to try to break his abdominal pain     Addendum: Hemoglobin A1c returns elevated at 7.7.  The patient's blood pressure was also elevated on discharge.  Initially, I thought this may be secondary to pain, as the patient does have a history of hypertension.  I treated him with his home dose of lisinopril, and labetalol IV.    Repeat blood pressure at 11:58 am is 153/107.  The patient's pain is now somewhat better after he received a full milligram of Dilaudid at one time. He continues to be concerned about his elevated blood pressure.  I did speak to our observation unit, who felt that the patient does not really meet criteria even for an observation admission.  He has chronic abdominal pain and an elevated blood pressure, which has been responsive to medication in the ED. .  I would like the patient to call his primary care physician today for close follow-up and to be rechecked in a day or so..  The etiology of his abdominal pain is unclear.  His abdominal CT does not show any acute findings.  He does appear to be much more comfortable on discharge after receiving several doses of IV Dilaudid..  He was told that his blood sugar is high and his glucose appears to be not very well controlled.  In addition, his blood pressure is high today and needs close follow-up and possible medication readjustment.  I have reviewed the findings, diagnosis, plan and need for follow up with the patient.    New Prescriptions    No medications on file       Final diagnoses:   Abdominal pain, right upper quadrant, unclear etiology   Hyperglycemia   Hypertensive urgency       1/18/2018   [facility], EMERGENCY DEPARTMENT  This note was created in part by the use of Dragon voice recognition dictation system.  Inadvertent grammatical errors and typographical errors may still exist.  MD Dheeraj Gamez Alda L, MD  01/18/18 3148

## 2018-01-18 NOTE — ED AVS SNAPSHOT
[facility], Emergency Department    [address]    [place] [address]    Phone:  [phone]    Fax:  [phone]                                       Parmjit Hernández   MRN: [id number]    Department:  [facility], Emergency Department   Date of Visit:  1/18/2018           Patient Information     Date Of Birth          1955        Your diagnoses for this visit were:     Abdominal pain, right upper quadrant, unclear etiology     Hyperglycemia     Hypertensive urgency        You were seen by Alexandra Esqueda MD.        Discharge Instructions       Please make an appointment to follow up with Your Primary Care Provider within 1-7 days.  Please follow-up with your GI doctor or our GI clinic. Please make an appointment to follow up with GI Clinic (phone: [phone]).  The cause of your abdominal pain is unclear.  Your blood sugar is high today.  A hemoglobin A1c has been drawn and you can follow this with your regular doctor.  Your blood pressure is high today and needs to be rechecked within a week.    24 Hour Appointment Hotline       To make an appointment at any [facility] clinic, call [phone] ([phone]). If you don't have a family doctor or clinic, we will help you find one. [facility] clinics are conveniently located to serve the needs of you and your family.             Review of your medicines      Our records show that you are taking the medicines listed below. If these are incorrect, please call your family doctor or clinic.        Dose / Directions Last dose taken    atorvastatin 10 MG tablet   Commonly known as:  LIPITOR   Quantity:  210 tablet        TAKE 1 TABLET BY MOUTH EVERY DAY   Refills:  0        CYMBALTA PO   Dose:  10 mg        Take 10 mg by mouth   Refills:  0        divalproex 500 MG EC tablet   Commonly known as:  DEPAKOTE   Dose:  1000 mg   Quantity:  10 tablet        Take 2 tablets (1,000 mg) by mouth daily   Refills:  0        doxepin 25 MG capsule   Commonly  known as:  SINEquan   Dose:  50 mg   Quantity:  10 capsule        Take 2 capsules (50 mg) by mouth At Bedtime   Refills:  0        furosemide 20 MG tablet   Commonly known as:  LASIX   Dose:  20 mg   Quantity:  90 tablet        Take 1 tablet (20 mg) by mouth 2 times daily   Refills:  1        gabapentin 800 MG tablet   Commonly known as:  NEURONTIN   Dose:  800 mg   Quantity:  90 tablet        Take 1 tablet (800 mg) by mouth 3 times daily   Refills:  11        ketoconazole 2 % shampoo   Commonly known as:  NIZORAL   Quantity:  120 mL        APPLY TO THE AFFECTED AREA AND WASH OFF AFTER 5 MINUTES   Refills:  2        lisinopril 10 MG tablet   Commonly known as:  PRINIVIL/ZESTRIL   Quantity:  90 tablet        TAKE 1 TABLET BY MOUTH DAILY   Refills:  1        metFORMIN 500 MG 24 hr tablet   Commonly known as:  GLUCOPHAGE-XR   Dose:  1000 mg   Quantity:  180 tablet        Take 2 tablets (1,000 mg) by mouth daily (with dinner)   Refills:  1        OLANZapine 7.5 MG tablet   Commonly known as:  zyPREXA   Dose:  7.5 mg   Quantity:  5 tablet        Take 1 tablet (7.5 mg) by mouth At Bedtime   Refills:  0        * order for DME   Quantity:  1 Device        Equipment being ordered: CPAP   Refills:  0        * order for DME   Quantity:  2 Box        Equipment being ordered: sharla hose   Refills:  1        SUMAtriptan 5 MG/ACT nasal spray   Commonly known as:  IMITREX   Quantity:  1 each        SPRAY 1 TO 2 SPRAYS IN NOSTRIL AS NEEDED FOR MIGRAINE. MAY REPEAT IN 2 HOURS. MAX 8 SPRAYS IN 24 HOURS   Refills:  0        * Notice:  This list has 2 medication(s) that are the same as other medications prescribed for you. Read the directions carefully, and ask your doctor or other care provider to review them with you.            Procedures and tests performed during your visit     CBC with platelets differential    CT Abdomen Pelvis w Contrast    Comprehensive metabolic panel    Creatinine POCT    Hemoglobin A1c    ISTAT creatinine  nursing POCT    Lactic acid whole blood    Lipase    UA reflex to Microscopic and Culture      Orders Needing Specimen Collection     None      Pending Results     No orders found from 1/16/2018 to 1/19/2018.            Pending Culture Results     No orders found from 1/16/2018 to 1/19/2018.            Pending Results Instructions     If you had any lab results that were not finalized at the time of your Discharge, you can call the ED Lab Result RN at [phone]. You will be contacted by this team for any positive Lab results or changes in treatment. The nurses are available 7 days a week from 10A to 6:30P.  You can leave a message 24 hours per day and they will return your call.        Thank you for choosing [facility]       Thank you for choosing [facility] for your care. Our goal is always to provide you with excellent care. Hearing back from our patients is one way we can continue to improve our services. Please take a few minutes to complete the written survey that you may receive in the mail after you visit with us. Thank you!        [employer] Information     "[employer]" gives you secure access to your electronic health record. If you see a primary care provider, you can also send messages to your care team and make appointments. If you have questions, please call your primary care clinic.  If you do not have a primary care provider, please call [phone] and they will assist you.        Care EveryWhere ID     This is your Care EveryWhere ID. This could be used by other organizations to access your [facility] medical records  [id number]        Equal Access to Services     JUSTYNA FRIEDMAN : Hadii freeman Blevins, steve pierce, qaybta juan malglenis peters . So [facility] [place] [facility] [phone].    ATENCIÓN: Si habla español, tiene a agudelo disposición servicios gratuitos de asistencia lingüística. Llame al [phone].    We comply with applicable federal civil rights laws and [place]  laws. We do not discriminate on the basis of race, color, national origin, age, disability, sex, sexual orientation, or gender identity.            After Visit Summary       This is your record. Keep this with you and show to your community pharmacist(s) and doctor(s) at your next visit.

## 2018-01-18 NOTE — DISCHARGE INSTRUCTIONS
Please make an appointment to follow up with Your Primary Care Provider within 1-7 days.  Please follow-up with your GI doctor or our GI clinic. Please make an appointment to follow up with GI Clinic (phone: [phone]).  The cause of your abdominal pain is unclear.  Your blood sugar is high today.  A hemoglobin A1c has been drawn and you can follow this with your regular doctor.  Your blood pressure is high today and needs to be rechecked within a week.

## 2018-01-25 ENCOUNTER — HOSPITAL ENCOUNTER (OUTPATIENT)
Facility: CLINIC | Age: 63
Setting detail: OBSERVATION
Discharge: HOME | End: 2018-01-25
Attending: EMERGENCY MEDICINE | Admitting: INTERNAL MEDICINE
Payer: MEDICARE

## 2018-01-25 ENCOUNTER — APPOINTMENT (OUTPATIENT)
Dept: CT IMAGING | Facility: CLINIC | Age: 63
End: 2018-01-25
Attending: EMERGENCY MEDICINE
Payer: MEDICARE

## 2018-01-25 ENCOUNTER — APPOINTMENT (OUTPATIENT)
Dept: MRI IMAGING | Facility: CLINIC | Age: 63
End: 2018-01-25
Attending: INTERNAL MEDICINE
Payer: MEDICARE

## 2018-01-25 VITALS
OXYGEN SATURATION: 94 % | RESPIRATION RATE: 18 BRPM | TEMPERATURE: 98.2 F | WEIGHT: 248.2 LBS | BODY MASS INDEX: 38.96 KG/M2 | SYSTOLIC BLOOD PRESSURE: 134 MMHG | HEIGHT: 67 IN | DIASTOLIC BLOOD PRESSURE: 88 MMHG

## 2018-01-25 DIAGNOSIS — R10.9 CHRONIC ABDOMINAL PAIN: ICD-10-CM

## 2018-01-25 DIAGNOSIS — I16.1 HYPERTENSIVE EMERGENCY: ICD-10-CM

## 2018-01-25 DIAGNOSIS — E78.5 HYPERLIPIDEMIA LDL GOAL <100: Primary | ICD-10-CM

## 2018-01-25 DIAGNOSIS — G89.29 CHRONIC ABDOMINAL PAIN: ICD-10-CM

## 2018-01-25 DIAGNOSIS — I10 HYPERTENSION GOAL BP (BLOOD PRESSURE) < 140/90: ICD-10-CM

## 2018-01-25 PROBLEM — R10.11 ABDOMINAL PAIN, CHRONIC, RIGHT UPPER QUADRANT: Status: ACTIVE | Noted: 2018-01-25

## 2018-01-25 LAB
ALBUMIN SERPL-MCNC: 3.6 G/DL (ref 3.4–5)
ALBUMIN UR-MCNC: NEGATIVE MG/DL
ALP SERPL-CCNC: 90 U/L (ref 40–150)
ALT SERPL W P-5'-P-CCNC: 30 U/L (ref 0–70)
AMPHETAMINES UR QL SCN: NEGATIVE
ANION GAP SERPL CALCULATED.3IONS-SCNC: 9 MMOL/L (ref 3–14)
APPEARANCE UR: CLEAR
AST SERPL W P-5'-P-CCNC: 12 U/L (ref 0–45)
BARBITURATES UR QL: NEGATIVE
BASOPHILS # BLD AUTO: 0 10E9/L (ref 0–0.2)
BASOPHILS NFR BLD AUTO: 0.5 %
BENZODIAZ UR QL: NEGATIVE
BILIRUB SERPL-MCNC: 0.8 MG/DL (ref 0.2–1.3)
BILIRUB UR QL STRIP: NEGATIVE
BUN SERPL-MCNC: 15 MG/DL (ref 7–30)
CALCIUM SERPL-MCNC: 9.5 MG/DL (ref 8.5–10.1)
CANNABINOIDS UR QL SCN: NEGATIVE
CHLORIDE SERPL-SCNC: 99 MMOL/L (ref 94–109)
CO2 SERPL-SCNC: 26 MMOL/L (ref 20–32)
COCAINE UR QL: NEGATIVE
COLOR UR AUTO: ABNORMAL
CREAT SERPL-MCNC: 0.83 MG/DL (ref 0.66–1.25)
DIFFERENTIAL METHOD BLD: ABNORMAL
EOSINOPHIL # BLD AUTO: 0.1 10E9/L (ref 0–0.7)
EOSINOPHIL NFR BLD AUTO: 0.7 %
ERYTHROCYTE [DISTWIDTH] IN BLOOD BY AUTOMATED COUNT: 13.6 % (ref 10–15)
ETHANOL UR QL SCN: NEGATIVE
GFR SERPL CREATININE-BSD FRML MDRD: >90 ML/MIN/1.7M2
GLUCOSE BLDC GLUCOMTR-MCNC: 205 MG/DL (ref 70–99)
GLUCOSE BLDC GLUCOMTR-MCNC: 211 MG/DL (ref 70–99)
GLUCOSE BLDC GLUCOMTR-MCNC: 240 MG/DL (ref 70–99)
GLUCOSE BLDC GLUCOMTR-MCNC: 241 MG/DL (ref 70–99)
GLUCOSE SERPL-MCNC: 256 MG/DL (ref 70–99)
GLUCOSE UR STRIP-MCNC: >1000 MG/DL
HCT VFR BLD AUTO: 36.8 % (ref 40–53)
HGB BLD-MCNC: 12.9 G/DL (ref 13.3–17.7)
HGB UR QL STRIP: NEGATIVE
IMM GRANULOCYTES # BLD: 0 10E9/L (ref 0–0.4)
IMM GRANULOCYTES NFR BLD: 0.2 %
KETONES UR STRIP-MCNC: 5 MG/DL
LACTATE BLD-SCNC: 1.5 MMOL/L (ref 0.7–2)
LEUKOCYTE ESTERASE UR QL STRIP: NEGATIVE
LIPASE SERPL-CCNC: 94 U/L (ref 73–393)
LYMPHOCYTES # BLD AUTO: 0.8 10E9/L (ref 0.8–5.3)
LYMPHOCYTES NFR BLD AUTO: 9.6 %
MCH RBC QN AUTO: 34.1 PG (ref 26.5–33)
MCHC RBC AUTO-ENTMCNC: 35.1 G/DL (ref 31.5–36.5)
MCV RBC AUTO: 97 FL (ref 78–100)
MONOCYTES # BLD AUTO: 0.3 10E9/L (ref 0–1.3)
MONOCYTES NFR BLD AUTO: 4.1 %
NEUTROPHILS # BLD AUTO: 7 10E9/L (ref 1.6–8.3)
NEUTROPHILS NFR BLD AUTO: 84.9 %
NITRATE UR QL: NEGATIVE
NRBC # BLD AUTO: 0 10*3/UL
NRBC BLD AUTO-RTO: 0 /100
NT-PROBNP SERPL-MCNC: 578 PG/ML (ref 0–900)
OPIATES UR QL SCN: NEGATIVE
PH UR STRIP: 7.5 PH (ref 5–7)
PLATELET # BLD AUTO: 112 10E9/L (ref 150–450)
POTASSIUM SERPL-SCNC: 4.2 MMOL/L (ref 3.4–5.3)
PROT SERPL-MCNC: 6.7 G/DL (ref 6.8–8.8)
RBC # BLD AUTO: 3.78 10E12/L (ref 4.4–5.9)
RBC #/AREA URNS AUTO: <1 /HPF (ref 0–2)
SODIUM SERPL-SCNC: 134 MMOL/L (ref 133–144)
SOURCE: ABNORMAL
SP GR UR STRIP: 1.01 (ref 1–1.03)
TROPONIN I SERPL-MCNC: 0.14 UG/L (ref 0–0.04)
TROPONIN I SERPL-MCNC: 0.15 UG/L (ref 0–0.04)
UROBILINOGEN UR STRIP-MCNC: NORMAL MG/DL (ref 0–2)
WBC # BLD AUTO: 8.3 10E9/L (ref 4–11)
WBC #/AREA URNS AUTO: <1 /HPF (ref 0–2)

## 2018-01-25 PROCEDURE — 93005 ELECTROCARDIOGRAM TRACING: CPT | Mod: XU | Performed by: EMERGENCY MEDICINE

## 2018-01-25 PROCEDURE — 99285 EMERGENCY DEPT VISIT HI MDM: CPT | Mod: 25 | Performed by: EMERGENCY MEDICINE

## 2018-01-25 PROCEDURE — 25000125 ZZHC RX 250: Performed by: EMERGENCY MEDICINE

## 2018-01-25 PROCEDURE — 25000128 H RX IP 250 OP 636: Performed by: INTERNAL MEDICINE

## 2018-01-25 PROCEDURE — 40000065 ZZH STATISTIC EKG NON-CHARGEABLE

## 2018-01-25 PROCEDURE — 85025 COMPLETE CBC W/AUTO DIFF WBC: CPT | Performed by: EMERGENCY MEDICINE

## 2018-01-25 PROCEDURE — 81001 URINALYSIS AUTO W/SCOPE: CPT | Performed by: EMERGENCY MEDICINE

## 2018-01-25 PROCEDURE — 83690 ASSAY OF LIPASE: CPT | Performed by: EMERGENCY MEDICINE

## 2018-01-25 PROCEDURE — 96372 THER/PROPH/DIAG INJ SC/IM: CPT | Performed by: EMERGENCY MEDICINE

## 2018-01-25 PROCEDURE — A9585 GADOBUTROL INJECTION: HCPCS | Performed by: INTERNAL MEDICINE

## 2018-01-25 PROCEDURE — 93010 ELECTROCARDIOGRAM REPORT: CPT | Mod: 76 | Performed by: INTERNAL MEDICINE

## 2018-01-25 PROCEDURE — 99291 CRITICAL CARE FIRST HOUR: CPT | Mod: 25 | Performed by: EMERGENCY MEDICINE

## 2018-01-25 PROCEDURE — 80320 DRUG SCREEN QUANTALCOHOLS: CPT | Performed by: EMERGENCY MEDICINE

## 2018-01-25 PROCEDURE — 25000132 ZZH RX MED GY IP 250 OP 250 PS 637: Mod: GY | Performed by: EMERGENCY MEDICINE

## 2018-01-25 PROCEDURE — G0378 HOSPITAL OBSERVATION PER HR: HCPCS

## 2018-01-25 PROCEDURE — 74160 CT ABDOMEN W/CONTRAST: CPT

## 2018-01-25 PROCEDURE — 80053 COMPREHEN METABOLIC PANEL: CPT | Performed by: EMERGENCY MEDICINE

## 2018-01-25 PROCEDURE — 84484 ASSAY OF TROPONIN QUANT: CPT | Performed by: EMERGENCY MEDICINE

## 2018-01-25 PROCEDURE — 00000146 ZZHCL STATISTIC GLUCOSE BY METER IP

## 2018-01-25 PROCEDURE — 25000128 H RX IP 250 OP 636: Performed by: EMERGENCY MEDICINE

## 2018-01-25 PROCEDURE — 96374 THER/PROPH/DIAG INJ IV PUSH: CPT | Performed by: EMERGENCY MEDICINE

## 2018-01-25 PROCEDURE — 93017 CV STRESS TEST TRACING ONLY: CPT

## 2018-01-25 PROCEDURE — 93005 ELECTROCARDIOGRAM TRACING: CPT

## 2018-01-25 PROCEDURE — 25000132 ZZH RX MED GY IP 250 OP 250 PS 637: Mod: GY | Performed by: INTERNAL MEDICINE

## 2018-01-25 PROCEDURE — 96372 THER/PROPH/DIAG INJ SC/IM: CPT

## 2018-01-25 PROCEDURE — A9270 NON-COVERED ITEM OR SERVICE: HCPCS | Mod: GY | Performed by: EMERGENCY MEDICINE

## 2018-01-25 PROCEDURE — A9270 NON-COVERED ITEM OR SERVICE: HCPCS | Mod: GY | Performed by: INTERNAL MEDICINE

## 2018-01-25 PROCEDURE — 96361 HYDRATE IV INFUSION ADD-ON: CPT | Performed by: EMERGENCY MEDICINE

## 2018-01-25 PROCEDURE — 93018 CV STRESS TEST I&R ONLY: CPT | Performed by: INTERNAL MEDICINE

## 2018-01-25 PROCEDURE — 75563 CARD MRI W/STRESS IMG & DYE: CPT | Mod: 26 | Performed by: INTERNAL MEDICINE

## 2018-01-25 PROCEDURE — 75563 CARD MRI W/STRESS IMG & DYE: CPT

## 2018-01-25 PROCEDURE — 99236 HOSP IP/OBS SAME DATE HI 85: CPT | Mod: 25 | Performed by: INTERNAL MEDICINE

## 2018-01-25 PROCEDURE — 96375 TX/PRO/DX INJ NEW DRUG ADDON: CPT | Performed by: EMERGENCY MEDICINE

## 2018-01-25 PROCEDURE — 80307 DRUG TEST PRSMV CHEM ANLYZR: CPT | Performed by: EMERGENCY MEDICINE

## 2018-01-25 PROCEDURE — 93016 CV STRESS TEST SUPVJ ONLY: CPT | Performed by: INTERNAL MEDICINE

## 2018-01-25 PROCEDURE — 25000131 ZZH RX MED GY IP 250 OP 636 PS 637: Mod: GY | Performed by: INTERNAL MEDICINE

## 2018-01-25 PROCEDURE — 93010 ELECTROCARDIOGRAM REPORT: CPT | Mod: Z6 | Performed by: EMERGENCY MEDICINE

## 2018-01-25 PROCEDURE — 83880 ASSAY OF NATRIURETIC PEPTIDE: CPT | Performed by: EMERGENCY MEDICINE

## 2018-01-25 PROCEDURE — 83605 ASSAY OF LACTIC ACID: CPT | Performed by: EMERGENCY MEDICINE

## 2018-01-25 RX ORDER — LABETALOL HYDROCHLORIDE 5 MG/ML
20 INJECTION, SOLUTION INTRAVENOUS ONCE
Status: COMPLETED | OUTPATIENT
Start: 2018-01-25 | End: 2018-01-25

## 2018-01-25 RX ORDER — ACETAMINOPHEN 325 MG/1
650 TABLET ORAL EVERY 4 HOURS PRN
Status: DISCONTINUED | OUTPATIENT
Start: 2018-01-25 | End: 2018-01-25 | Stop reason: HOSPADM

## 2018-01-25 RX ORDER — AMINOPHYLLINE 25 MG/ML
100 INJECTION, SOLUTION INTRAVENOUS ONCE
Status: COMPLETED | OUTPATIENT
Start: 2018-01-25 | End: 2018-01-25

## 2018-01-25 RX ORDER — DIAZEPAM 5 MG
5 TABLET ORAL EVERY 30 MIN PRN
Status: DISCONTINUED | OUTPATIENT
Start: 2018-01-25 | End: 2018-01-25

## 2018-01-25 RX ORDER — OLANZAPINE 10 MG/2ML
10 INJECTION, POWDER, FOR SOLUTION INTRAMUSCULAR ONCE
Status: COMPLETED | OUTPATIENT
Start: 2018-01-25 | End: 2018-01-25

## 2018-01-25 RX ORDER — IOPAMIDOL 755 MG/ML
100 INJECTION, SOLUTION INTRAVASCULAR ONCE
Status: COMPLETED | OUTPATIENT
Start: 2018-01-25 | End: 2018-01-25

## 2018-01-25 RX ORDER — DOXEPIN HYDROCHLORIDE 25 MG/1
50 CAPSULE ORAL AT BEDTIME
Status: DISCONTINUED | OUTPATIENT
Start: 2018-01-25 | End: 2018-01-25 | Stop reason: HOSPADM

## 2018-01-25 RX ORDER — NITROGLYCERIN 0.4 MG/1
0.4 TABLET SUBLINGUAL EVERY 5 MIN PRN
Status: DISCONTINUED | OUTPATIENT
Start: 2018-01-25 | End: 2018-01-25

## 2018-01-25 RX ORDER — DIVALPROEX SODIUM 500 MG/1
1000 TABLET, DELAYED RELEASE ORAL DAILY
Status: DISCONTINUED | OUTPATIENT
Start: 2018-01-25 | End: 2018-01-25 | Stop reason: HOSPADM

## 2018-01-25 RX ORDER — ACETAMINOPHEN 650 MG/1
650 SUPPOSITORY RECTAL EVERY 4 HOURS PRN
Status: DISCONTINUED | OUTPATIENT
Start: 2018-01-25 | End: 2018-01-25 | Stop reason: HOSPADM

## 2018-01-25 RX ORDER — REGADENOSON 0.08 MG/ML
0.4 INJECTION, SOLUTION INTRAVENOUS ONCE
Status: COMPLETED | OUTPATIENT
Start: 2018-01-25 | End: 2018-01-25

## 2018-01-25 RX ORDER — ASPIRIN 81 MG/1
324 TABLET, CHEWABLE ORAL ONCE
Status: COMPLETED | OUTPATIENT
Start: 2018-01-25 | End: 2018-01-25

## 2018-01-25 RX ORDER — LABETALOL HYDROCHLORIDE 5 MG/ML
10 INJECTION, SOLUTION INTRAVENOUS ONCE
Status: COMPLETED | OUTPATIENT
Start: 2018-01-25 | End: 2018-01-25

## 2018-01-25 RX ORDER — FUROSEMIDE 20 MG/1
10 TABLET ORAL DAILY
Status: DISCONTINUED | OUTPATIENT
Start: 2018-01-25 | End: 2018-01-25

## 2018-01-25 RX ORDER — ACYCLOVIR 200 MG/1
0-1 CAPSULE ORAL
Status: DISCONTINUED | OUTPATIENT
Start: 2018-01-25 | End: 2018-01-25

## 2018-01-25 RX ORDER — LISINOPRIL 20 MG/1
20 TABLET ORAL DAILY
Status: DISCONTINUED | OUTPATIENT
Start: 2018-01-25 | End: 2018-01-25 | Stop reason: HOSPADM

## 2018-01-25 RX ORDER — ISOSORBIDE DINITRATE 20 MG/1
20 TABLET ORAL ONCE
Status: COMPLETED | OUTPATIENT
Start: 2018-01-25 | End: 2018-01-25

## 2018-01-25 RX ORDER — DEXTROSE MONOHYDRATE 25 G/50ML
25-50 INJECTION, SOLUTION INTRAVENOUS
Status: DISCONTINUED | OUTPATIENT
Start: 2018-01-25 | End: 2018-01-25 | Stop reason: HOSPADM

## 2018-01-25 RX ORDER — METOPROLOL TARTRATE 1 MG/ML
5 INJECTION, SOLUTION INTRAVENOUS ONCE
Status: COMPLETED | OUTPATIENT
Start: 2018-01-25 | End: 2018-01-25

## 2018-01-25 RX ORDER — DULOXETIN HYDROCHLORIDE 20 MG/1
20 CAPSULE, DELAYED RELEASE ORAL DAILY
Status: DISCONTINUED | OUTPATIENT
Start: 2018-01-25 | End: 2018-01-25 | Stop reason: HOSPADM

## 2018-01-25 RX ORDER — HYDRALAZINE HYDROCHLORIDE 20 MG/ML
10 INJECTION INTRAMUSCULAR; INTRAVENOUS ONCE
Status: COMPLETED | OUTPATIENT
Start: 2018-01-25 | End: 2018-01-25

## 2018-01-25 RX ORDER — GADOBUTROL 604.72 MG/ML
10 INJECTION INTRAVENOUS ONCE
Status: COMPLETED | OUTPATIENT
Start: 2018-01-25 | End: 2018-01-25

## 2018-01-25 RX ORDER — LIDOCAINE 40 MG/G
CREAM TOPICAL
Status: DISCONTINUED | OUTPATIENT
Start: 2018-01-25 | End: 2018-01-25 | Stop reason: HOSPADM

## 2018-01-25 RX ORDER — LISINOPRIL 20 MG/1
20 TABLET ORAL DAILY
Qty: 90 TABLET | Refills: 3 | Status: SHIPPED | OUTPATIENT
Start: 2018-01-25 | End: 2018-12-10

## 2018-01-25 RX ORDER — ATORVASTATIN CALCIUM 10 MG/1
10 TABLET, FILM COATED ORAL DAILY
Status: DISCONTINUED | OUTPATIENT
Start: 2018-01-25 | End: 2018-01-25 | Stop reason: HOSPADM

## 2018-01-25 RX ORDER — METOPROLOL TARTRATE 25 MG/1
25 TABLET, FILM COATED ORAL 2 TIMES DAILY
Status: DISCONTINUED | OUTPATIENT
Start: 2018-01-25 | End: 2018-01-25 | Stop reason: HOSPADM

## 2018-01-25 RX ORDER — LISINOPRIL 10 MG/1
10 TABLET ORAL ONCE
Status: COMPLETED | OUTPATIENT
Start: 2018-01-25 | End: 2018-01-25

## 2018-01-25 RX ORDER — ALUMINA, MAGNESIA, AND SIMETHICONE 2400; 2400; 240 MG/30ML; MG/30ML; MG/30ML
30 SUSPENSION ORAL EVERY 4 HOURS PRN
Status: DISCONTINUED | OUTPATIENT
Start: 2018-01-25 | End: 2018-01-25 | Stop reason: HOSPADM

## 2018-01-25 RX ORDER — ALBUTEROL SULFATE 90 UG/1
2 AEROSOL, METERED RESPIRATORY (INHALATION) EVERY 5 MIN PRN
Status: DISCONTINUED | OUTPATIENT
Start: 2018-01-25 | End: 2018-01-25

## 2018-01-25 RX ORDER — GABAPENTIN 800 MG/1
800 TABLET ORAL 3 TIMES DAILY
Status: DISCONTINUED | OUTPATIENT
Start: 2018-01-25 | End: 2018-01-25 | Stop reason: HOSPADM

## 2018-01-25 RX ORDER — NITROGLYCERIN 0.4 MG/1
0.4 TABLET SUBLINGUAL EVERY 5 MIN PRN
Status: DISCONTINUED | OUTPATIENT
Start: 2018-01-25 | End: 2018-01-25 | Stop reason: HOSPADM

## 2018-01-25 RX ORDER — NICOTINE POLACRILEX 4 MG
15-30 LOZENGE BUCCAL
Status: DISCONTINUED | OUTPATIENT
Start: 2018-01-25 | End: 2018-01-25 | Stop reason: HOSPADM

## 2018-01-25 RX ADMIN — AMINOPHYLLINE 100 MG: 25 INJECTION, SOLUTION INTRAVENOUS at 13:19

## 2018-01-25 RX ADMIN — DIVALPROEX SODIUM 1000 MG: 500 TABLET, DELAYED RELEASE ORAL at 12:32

## 2018-01-25 RX ADMIN — ISOSORBIDE DINITRATE 20 MG: 20 TABLET ORAL at 12:32

## 2018-01-25 RX ADMIN — GADOBUTROL 10 ML: 604.72 INJECTION INTRAVENOUS at 14:12

## 2018-01-25 RX ADMIN — ATORVASTATIN CALCIUM 10 MG: 10 TABLET, FILM COATED ORAL at 09:14

## 2018-01-25 RX ADMIN — ACETAMINOPHEN 650 MG: 325 TABLET, FILM COATED ORAL at 09:14

## 2018-01-25 RX ADMIN — OLANZAPINE 10 MG: 10 INJECTION, POWDER, FOR SOLUTION INTRAMUSCULAR at 04:51

## 2018-01-25 RX ADMIN — GABAPENTIN 800 MG: 800 TABLET, FILM COATED ORAL at 14:52

## 2018-01-25 RX ADMIN — NITROGLYCERIN 0.4 MG: 0.4 TABLET SUBLINGUAL at 04:12

## 2018-01-25 RX ADMIN — METOPROLOL TARTRATE 5 MG: 5 INJECTION, SOLUTION INTRAVENOUS at 05:24

## 2018-01-25 RX ADMIN — DULOXETINE 20 MG: 20 CAPSULE, DELAYED RELEASE ORAL at 09:14

## 2018-01-25 RX ADMIN — ACETAMINOPHEN 650 MG: 325 TABLET, FILM COATED ORAL at 14:51

## 2018-01-25 RX ADMIN — HYDRALAZINE HYDROCHLORIDE 10 MG: 20 INJECTION INTRAMUSCULAR; INTRAVENOUS at 06:37

## 2018-01-25 RX ADMIN — LISINOPRIL 10 MG: 10 TABLET ORAL at 05:10

## 2018-01-25 RX ADMIN — LISINOPRIL 20 MG: 20 TABLET ORAL at 09:14

## 2018-01-25 RX ADMIN — IOPAMIDOL 75 ML: 755 INJECTION, SOLUTION INTRAVENOUS at 06:11

## 2018-01-25 RX ADMIN — GABAPENTIN 800 MG: 800 TABLET, FILM COATED ORAL at 10:10

## 2018-01-25 RX ADMIN — SODIUM CHLORIDE 75 ML: 9 INJECTION, SOLUTION INTRAVENOUS at 06:12

## 2018-01-25 RX ADMIN — REGADENOSON 0.4 MG: 0.08 INJECTION, SOLUTION INTRAVENOUS at 13:16

## 2018-01-25 RX ADMIN — NITROGLYCERIN 0.4 MG: 0.4 TABLET SUBLINGUAL at 06:35

## 2018-01-25 RX ADMIN — SODIUM CHLORIDE 1000 ML: 9 INJECTION, SOLUTION INTRAVENOUS at 04:25

## 2018-01-25 RX ADMIN — ENOXAPARIN SODIUM 40 MG: 40 INJECTION SUBCUTANEOUS at 09:14

## 2018-01-25 RX ADMIN — Medication 20 MG: at 06:23

## 2018-01-25 RX ADMIN — Medication 10 MG: at 06:10

## 2018-01-25 RX ADMIN — ASPIRIN 81 MG CHEWABLE TABLET 324 MG: 81 TABLET CHEWABLE at 04:22

## 2018-01-25 RX ADMIN — METOPROLOL TARTRATE 25 MG: 25 TABLET ORAL at 09:14

## 2018-01-25 RX ADMIN — FAMOTIDINE 20 MG: 10 INJECTION, SOLUTION INTRAVENOUS at 04:29

## 2018-01-25 RX ADMIN — INSULIN ASPART 2 UNITS: 100 INJECTION, SOLUTION INTRAVENOUS; SUBCUTANEOUS at 18:25

## 2018-01-25 RX ADMIN — NITROGLYCERIN 0.4 MG: 0.4 TABLET SUBLINGUAL at 04:27

## 2018-01-25 ASSESSMENT — ENCOUNTER SYMPTOMS
NAUSEA: 0
VOMITING: 0
SHORTNESS OF BREATH: 0
ABDOMINAL PAIN: 1
DIARRHEA: 0
FEVER: 0

## 2018-01-25 NOTE — ED PROVIDER NOTES
History     Chief Complaint   Patient presents with     Abdominal Pain     reported chronic abdominal pain x1 year. Pain is getting worse.      Chest Pain     c/o sharp pain started around midnight.      HPI  Parmjit Hernández is a 62 year old male who presents with epigastric and right upper quadrant abdominal pain which she describes as burning.  He states it has been ongoing for nearly 24 hours.  He states it has been constant nothing makes it better or worse.  He notes a long history of ongoing chronic abdominal pain, though states that this is worse than typical.  He denies any nausea, vomiting, diarrhea.  He states that his current pain is in the same location in the of the same character as his chronic pain, just much worse.  No fever, cough, shortness of breath.  He states occasionally the pain seems to radiate up into his chest.  He denies any urinary complaints.  Denies history of DVT or PE.  Denies cardiac history.  He does have a history of diabetes and hypertension.  He states that he normally takes his high blood pressure medication around breakfast time.  He states that the only thing that has historically helped his abdominal pain is narcotics.  He did not take an aspirin this morning.  He states he is here looking for relief.  He admits that he has not attempted to make an appointment with either his primary care provider or his GI doctor since his recent visit here for the same complaint one week ago.    Per chart review, he additionally has a history of amyloidosis, status post treatment at [place], as well as prescription drug abuse, status post treatment at [facility].    Past Medical History:   Diagnosis Date     Allergic state      Amyloidosis (H)      Diabetes mellitus (H)     type 2     Headache(784.0)      Hypertension 2007     Myalgia and myositis, unspecified      Obsessive-compulsive disorders      Other acquired absence of organ 94     Other specified viral warts       Pain in the abdomen      Pure hypercholesterolemia        Past Surgical History:   Procedure Laterality Date     BONE MARROW BIOPSY, BONE SPECIMEN, NEEDLE/TROCAR N/A 6/8/2016    Procedure: BIOPSY BONE MARROW;  Surgeon: Nathan Agrawal MD;  Location:  GI     C NONSPECIFIC PROCEDURE  94    Cholecystectomy     C NONSPECIFIC PROCEDURE  2000    repair deviated septum     CHOLECYSTECTOMY  1995    lap qian     COLONOSCOPY  2012    hx polyps     ESOPHAGOSCOPY, GASTROSCOPY, DUODENOSCOPY (EGD), COMBINED N/A 5/29/2015    Procedure: COMBINED ESOPHAGOSCOPY, GASTROSCOPY, DUODENOSCOPY (EGD), BIOPSY SINGLE OR MULTIPLE;  Surgeon: John Jacob MD;  Location:  GI     HERNIA REPAIR, UMBILICAL  2006       Family History   Problem Relation Age of Onset     CEREBROVASCULAR DISEASE Mother      C.A.D. Mother      Hypertension Mother      Alcohol/Drug Mother      Arthritis Mother      CEREBROVASCULAR DISEASE Maternal Grandmother      C.A.D. Maternal Grandmother      Alcohol/Drug Maternal Grandmother      C.A.D. Father      HEART DISEASE Father      Hypertension Father      Alcohol/Drug Father      Allergies Father      Circulatory Father      Depression Father      Respiratory Father      Asthma Father      Alcohol/Drug Paternal Grandfather      CEREBROVASCULAR DISEASE Paternal Grandfather      Depression Son      Psychotic Disorder Son      anxiety disorder     Depression Daughter      CEREBROVASCULAR DISEASE Maternal Grandfather      CEREBROVASCULAR DISEASE Paternal Grandmother      DIABETES Brother      Allergies Sister      Depression Sister      Gynecology Sister      Coronary Artery Disease No family hx of      Hyperlipidemia No family hx of      Breast Cancer No family hx of      Colon Cancer No family hx of      Prostate Cancer No family hx of      Other Cancer No family hx of      Anxiety Disorder No family hx of      MENTAL ILLNESS No family hx of      Substance Abuse No family hx of      Anesthesia  "Reaction No family hx of      OSTEOPOROSIS No family hx of      Genetic Disorder No family hx of      Thyroid Disease No family hx of      Obesity No family hx of      Unknown/Adopted No family hx of        Social History   Substance Use Topics     Smoking status: Never Smoker     Smokeless tobacco: Never Used     Alcohol use No         I have reviewed the Medications, Allergies, Past Medical and Surgical History, and Social History in the Epic system.    Review of Systems   Constitutional: Negative for fever.   Respiratory: Negative for shortness of breath.    Cardiovascular: Positive for chest pain.   Gastrointestinal: Positive for abdominal pain. Negative for diarrhea, nausea and vomiting.   All other systems reviewed and are negative.      Physical Exam   BP: (!) 205/131  Heart Rate: 108  Temp: 97.8  F (36.6  C)  Resp: 20  Height: 170.2 cm (5' 7\")  Weight: 112.5 kg (248 lb)  SpO2: 93 %      Physical Exam   Constitutional: He appears distressed (appears mildly uncomfortable.).   HENT:   Head: Atraumatic.   Mouth/Throat: Oropharynx is clear and moist. No oropharyngeal exudate.   Eyes: Pupils are equal, round, and reactive to light. No scleral icterus.   Cardiovascular: Normal heart sounds and intact distal pulses.    Pulmonary/Chest: Breath sounds normal. No respiratory distress.   Abdominal: Soft. There is tenderness (tenderness with mild voluntary guarding in the epigastrium and RUQ).   Musculoskeletal: He exhibits no edema or tenderness.   Skin: Skin is warm. No rash noted. He is not diaphoretic.       ED Course     ED Course     Procedures             EKG Interpretation:      Interpreted by Marva Joseph  Time reviewed: 0404  Symptoms at time of EKG: upper abd pain   Rhythm: sinus tachycardia  Rate: 105  Axis: Left Axis Deviation  Ectopy: none  Conduction: normal  ST Segments/ T Waves: T wave inversions III and aVF  Q Waves: none  Comparison to prior: Unchanged    Clinical Impression: sinus tach - stable " EKG                Critical Care time:  30 minutes             Labs Ordered and Resulted from Time of ED Arrival Up to the Time of Departure from the ED   CBC WITH PLATELETS DIFFERENTIAL - Abnormal; Notable for the following:        Result Value    RBC Count 3.78 (*)     Hemoglobin 12.9 (*)     Hematocrit 36.8 (*)     MCH 34.1 (*)     Platelet Count 112 (*)     All other components within normal limits   COMPREHENSIVE METABOLIC PANEL - Abnormal; Notable for the following:     Glucose 256 (*)     Protein Total 6.7 (*)     All other components within normal limits   TROPONIN I - Abnormal; Notable for the following:     Troponin I ES 0.154 (*)     All other components within normal limits   GLUCOSE BY METER - Abnormal; Notable for the following:     Glucose 240 (*)     All other components within normal limits   ROUTINE UA WITH MICROSCOPIC - Abnormal; Notable for the following:     Glucose Urine >1000 (*)     Ketones Urine 5 (*)     pH Urine 7.5 (*)     All other components within normal limits   LIPASE   LACTIC ACID WHOLE BLOOD   DRUG ABUSE SCREEN 6 CHEM DEP URINE ([facility])   TROPONIN I            Assessments & Plan (with Medical Decision Making)   I did review the patient's record, and do see that he has had a significant number of ED visits over the last 3 years, both here and to other EGDs in the area, with similar symptoms of abdominal pain.  I see a record skin in the chart from his GI doctor, from September 2017, noting an extensive evaluation without cause for his symptoms discovered.  He did have a CT scan of his abdomen one week ago for the same pain, without new findings.  The patient repeatedly stated that the only thing that would help his pain was opiate pain medication.  I did explain to him that we would not be treating his chronic abdominal pain in the emergency department with any controlled substances, including opiate pain medications, without new or acute findings.  CBC, CMP, lipase were not  remarkable, with the exception of an elevated glucose at 256.    He did report some mild intermittent pain radiating up into the central chest.  EKG was done which showed mild sinus tachycardia, was otherwise stable when compared to previous.  Troponin did come back mildly elevated at 0.154.  He was given aspirin prior to that, as well as nitro, without improvement in his abdominal pain.  He has had no chest pain while here in the ER.  He has no cardiac history, but does have a history of hypertension, diabetes, hypercholesterolemia.  Although ACS is possible, I do think that hypertensive emergency is more likely, given his very high blood pressures (200s over 120s) on arrival.  Blood pressure did come down initially with nitro, but started to creep up.  He was initially given metoprolol, followed by labetalol.  Blood pressures remain high, was given a dose of hydralazine.  I did speak with Dr. Menon from cardiology, who agreed it was appropriate to admit the patient to the cardiology service for further treatment, monitoring, and evaluation.  I did explain to the patient that they would not be treating his chronic abdominal pain with opiates while in the hospital.    Additionally, certainly the significant high blood pressure as well as the reported chest and abdominal pain gordon the concern for aortic dissection.  I did discuss this concern with both  as well as the patient.  The patient did agree to a CT to evaluate for this.  CT was done and was neg for aortic dissection, or other acute abnormality.  Repeat troponin was also drawn right before the patient went over to the [place] and, and is pending at this time.    Dictation Disclaimer: Some of this Note has been completed with voice-recognition dictation software. Although errors are generally corrected real-time, there is the potential for a rare error to be present in the completed chart.      I have reviewed the nursing notes.    I have  reviewed the findings, diagnosis, plan and need for follow up with the patient.    Current Discharge Medication List          Final diagnoses:   Hypertensive emergency   Chronic abdominal pain       1/25/2018   [facility], [place], EMERGENCY DEPARTMENT     Marva Joseph MD  01/29/18 0428

## 2018-01-25 NOTE — ED NOTES
[facility], [place]   ED Nurse to Floor Handoff     Parmjit Hernández is a 62 year old male who speaks English and lives alone,  in a home  They arrived in the ED by taxi from home    ED Chief Complaint: Abdominal Pain (reported chronic abdominal pain x1 year. Pain is getting worse. ) and Chest Pain (c/o sharp pain started around midnight. )    ED Dx;   Final diagnoses:   Hypertensive emergency         Needed?: No    Allergies:   Allergies   Allergen Reactions     Liraglutide Other (See Comments)     Sulfa Drugs Swelling     Pt has taken  Taken sulfa drugs orally without trouble. He had problems with Sulfa eye drops. Eye swelled up     Victoza      Increased migraine frequency and severity   .  Past Medical Hx:   Past Medical History:   Diagnosis Date     Allergic state      Amyloidosis (H)      Diabetes mellitus (H)     type 2     Headache(784.0)      Hypertension 2007     Myalgia and myositis, unspecified      Obsessive-compulsive disorders      Other acquired absence of organ 94     Other specified viral warts      Pain in the abdomen      Pure hypercholesterolemia       Baseline Mental status: WDL  Current Mental Status changes: at basesline    Infection: No  Sepsis suspected: No  Isolation type: No active isolations     Activity level - Baseline/Home:  Independent  Activity Level - Current:   Independent    Bariatric equipment needed?: No    In the ED these meds were given:   Medications   nitroGLYcerin (NITROSTAT) sublingual tablet 0.4 mg (0.4 mg Sublingual Given 1/25/18 2377)   0.9% sodium chloride BOLUS (1,000 mLs Intravenous New Bag 1/25/18 6595)   aspirin chewable tablet 324 mg (324 mg Oral Given 1/25/18 0422)   famotidine (PEPCID) injection 20 mg (20 mg Intravenous Given 1/25/18 3029)   OLANZapine (zyPREXA) injection 10 mg (10 mg Intramuscular Given 1/25/18 0093)   lisinopril (PRINIVIL/ZESTRIL) tablet 10 mg (10 mg Oral Given 1/25/18 7467)       Drips running?   "Yes    Home pump or pre-existing LDA's present? No    Labs results:   Labs Ordered and Resulted from Time of ED Arrival Up to the Time of Departure from the ED   CBC WITH PLATELETS DIFFERENTIAL - Abnormal; Notable for the following:        Result Value    RBC Count 3.78 (*)     Hemoglobin 12.9 (*)     Hematocrit 36.8 (*)     MCH 34.1 (*)     Platelet Count 112 (*)     All other components within normal limits   COMPREHENSIVE METABOLIC PANEL - Abnormal; Notable for the following:     Glucose 256 (*)     Protein Total 6.7 (*)     All other components within normal limits   TROPONIN I - Abnormal; Notable for the following:     Troponin I ES 0.154 (*)     All other components within normal limits   GLUCOSE BY METER - Abnormal; Notable for the following:     Glucose 240 (*)     All other components within normal limits   ROUTINE UA WITH MICROSCOPIC - Abnormal; Notable for the following:     Glucose Urine >1000 (*)     Ketones Urine 5 (*)     pH Urine 7.5 (*)     All other components within normal limits   LIPASE   LACTIC ACID WHOLE BLOOD   DRUG ABUSE SCREEN 6 CHEM DEP URINE ([facility])       Imaging Studies: No results found for this or any previous visit (from the past 24 hour(s)).    Recent vital signs:   BP (!) 135/107  Temp 97.8  F (36.6  C) (Oral)  Resp 22  Ht 1.702 m (5' 7\")  Wt 112.5 kg (248 lb)  SpO2 96%  BMI 38.84 kg/m2    Cardiac Rhythm: Tachycardia  Pt needs tele? Yes  Skin/wound Issues: None    Code Status: Full Code    Pain control: fair    Nausea control: fair    Abnormal labs/tests/findings requiring intervention:     Family present during ED course? No   Family Comments/Social Situation comments:     Tasks needing completion: none    Jose De Jesus Marie RN  [facility]-- [id number]   [phone]  [phone]    "

## 2018-01-25 NOTE — IP AVS SNAPSHOT
[address] [address]    Phone:  [phone]                                       After Visit Summary   1/25/2018    Parmjit Hernández    MRN: [id number]           After Visit Summary Signature Page     I have received my discharge instructions, and my questions have been answered. I have discussed any challenges I see with this plan with the nurse or doctor.    ..........................................................................................................................................  Patient/Patient Representative Signature      ..........................................................................................................................................  Patient Representative Print Name and Relationship to Patient    ..................................................               ................................................  Date                                            Time    ..........................................................................................................................................  Reviewed by Signature/Title    ...................................................              ..............................................  Date                                                            Time

## 2018-01-25 NOTE — PLAN OF CARE
Problem: Pain, Acute (Adult)  Goal: Identify Related Risk Factors and Signs and Symptoms  Related risk factors and signs and symptoms are identified upon initiation of [employer] Clinical Practice Guideline (CPG).  Outcome: No Change  Pt transferred to  from [place] ED at 0720. Pt into ED w/ constant RUQ pain over 24hrs prior to coming in, elevated trop w/ no c/o chest pain. Hx BMT 2016, DM2, HTN, bipolar,   Neuro: A&Ox4. Calm, cooperative.   Cardiac: SR w/ possible BBB. HR 90's. Hypertensive. MD aware. Lisinopril, metoprolol PO given. Afebrile  Respiratory: Sating 97% on RA. LS clear. RR 18. Breathing unlabored. Pt c/o hyperventilation-not assessed or observed through shift.   GI/: Adequate urine output in urinal. Last BM yesterday 1/24  Diet/appetite: NPO. -MD aware and will order insulin  Activity:  Up Independently in room. Ambulating to bathroom  Pain: Pt c/o intolerable abdominal pain. Tylenol given x1. Pt sleeping staff entering room throughout shift. Snoring, appears to be resting comfortably.   Skin: Intact, no new deficits noted.  LDA's: L PIV X2 SL    Abd XRay done at [place] ED. EKG completed this AM: SR SANTOS BBB.     Plan: Cardiac MRI at 1230 today.. Continue with POC. Notify primary team with changes.     01/25/18 1034   Pain, Acute   Related Risk Factors (Acute Pain) patient perception;persistent pain   Signs and Symptoms (Acute Pain) verbalization of pain descriptors

## 2018-01-25 NOTE — IP AVS SNAPSHOT
MRN:[id number]                      After Visit Summary   1/25/2018    Parmjit Hernández    MRN: [id number]           Thank you!     Thank you for choosing [facility] for your care. Our goal is always to provide you with excellent care. Hearing back from our patients is one way we can continue to improve our services. Please take a few minutes to complete the written survey that you may receive in the mail after you visit with us. Thank you!        Patient Information     Date Of Birth          1955        Designated Caregiver       Most Recent Value    Caregiver    Will someone help with your care after discharge? no      About your hospital stay     You were admitted on:  January 25, 2018 You last received care in the:  [address] [facility]    You were discharged on:  January 25, 2018        Reason for your hospital stay       Abdominal pain, chest discomfort, high blood pressure                  Who to Call     For medical emergencies, please call 911.  For non-urgent questions about your medical care, please call your primary care provider or clinic, [phone]          Attending Provider     Provider Specialty    Marva Joseph MD Emergency Medicine    [facility], Eb Jordan MD Clinical Cardiac Electrophysiology    [facility] MD Internal Medicine - Interventional Cardiology       Primary Care Provider Office Phone # Fax #    Maribeth Ardon [phone]      After Care Instructions     Activity       Your activity upon discharge: activity as tolerated            Diet       Follow this diet upon discharge:       Consistent Carbohydrate Diet [id number] Calories: High Consistent CHO (4-7 CHO units/meal)                  Follow-up Appointments     Follow Up and recommended labs and tests       Follow up with primary care provider, Maribeth Ardon, within 3, for hospital follow- up. No follow up labs or test are needed.                  Pending Results      "Date and Time Order Name Status Description    1/25/2018 1402 EKG 12-lead, complete Preliminary     1/25/2018 0751 EKG 12-lead, complete Preliminary     1/25/2018 0731 MRI Cardiac w/contrast and stress In process             Statement of Approval     Ordered          01/25/18 4573  I have reviewed and agree with all the recommendations and orders detailed in this document.  EFFECTIVE NOW     Approved and electronically signed by:  Justyn Katz MD             Admission Information     Date & Time Provider Department Dept. Phone    1/25/2018 Kt Stephen MD [address] [facility] [phone]      Your Vitals Were     Blood Pressure Temperature Respirations Height Weight Pulse Oximetry    134/88 (BP Location: Right arm) 98.2  F (36.8  C) (Oral) 18 1.702 m (5' 7\") 112.6 kg (248 lb 3.2 oz) 94%    BMI (Body Mass Index)                   38.87 kg/m2           [email or web address] Information     [email or web address] gives you secure access to your electronic health record. If you see a primary care provider, you can also send messages to your care team and make appointments. If you have questions, please call your primary care clinic.  If you do not have a primary care provider, please call [phone] and they will assist you.        Care EveryWhere ID     This is your Care EveryWhere ID. This could be used by other organizations to access your [place] medical records  [id number]        Equal Access to Services     JUSTYNA FRIEDMAN : Hadii freeman Blevins, waaxda luqadaha, qaybta kaalmaselene alves, glenis mckenzie . So [facility] [phone].    ATENCIÓN: Si habla español, tiene a agudelo disposición servicios gratuitos de asistencia lingüística. Llame al [phone].    We comply with applicable federal civil rights laws and [place] laws. We do not discriminate on the basis of race, color, national origin, age, disability, sex, sexual orientation, or gender identity.               Review of your medicines "      START taking        Dose / Directions    aspirin 81 MG tablet   Commonly known as:  ASPIRIN LOW DOSE   Used for:  Hyperlipidemia LDL goal <100, Hypertension goal BP (blood pressure) < 140/90        Dose:  81 mg   Take 1 tablet (81 mg) by mouth daily   Quantity:  30 tablet   Refills:  3         CONTINUE these medicines which may have CHANGED, or have new prescriptions. If we are uncertain of the size of tablets/capsules you have at home, strength may be listed as something that might have changed.        Dose / Directions    lisinopril 20 MG tablet   Commonly known as:  PRINIVIL/ZESTRIL   This may have changed:  See the new instructions.   Used for:  Hypertension goal BP (blood pressure) < 140/90        Dose:  20 mg   Take 1 tablet (20 mg) by mouth daily   Quantity:  90 tablet   Refills:  3         CONTINUE these medicines which have NOT CHANGED        Dose / Directions    atorvastatin 10 MG tablet   Commonly known as:  LIPITOR   Used for:  Hyperlipidemia LDL goal <100        TAKE 1 TABLET BY MOUTH EVERY DAY   Quantity:  210 tablet   Refills:  0       CYMBALTA PO        Dose:  20 mg   Take 20 mg by mouth daily   Refills:  0       divalproex 500 MG EC tablet   Commonly known as:  DEPAKOTE   Used for:  Hyperlipidemia LDL goal <100        Dose:  1000 mg   Take 2 tablets (1,000 mg) by mouth daily   Quantity:  10 tablet   Refills:  0       doxepin 25 MG capsule   Commonly known as:  SINEquan   Used for:  Hyperlipidemia LDL goal <100        Dose:  50 mg   Take 2 capsules (50 mg) by mouth At Bedtime   Quantity:  10 capsule   Refills:  0       gabapentin 800 MG tablet   Commonly known as:  NEURONTIN   Used for:  Chronic abdominal pain        Dose:  800 mg   Take 1 tablet (800 mg) by mouth 3 times daily   Quantity:  90 tablet   Refills:  11       ketoconazole 2 % shampoo   Commonly known as:  NIZORAL   Used for:  Tinea corporis        APPLY TO THE AFFECTED AREA AND WASH OFF AFTER 5 MINUTES   Quantity:  120 mL   Refills:   2       metFORMIN 500 MG 24 hr tablet   Commonly known as:  GLUCOPHAGE-XR   Used for:  Type 2 diabetes mellitus without complication, without long-term current use of insulin (H)        Dose:  1000 mg   Take 2 tablets (1,000 mg) by mouth daily (with dinner)   Quantity:  180 tablet   Refills:  1       OLANZapine 7.5 MG tablet   Commonly known as:  zyPREXA   Used for:  Hyperlipidemia LDL goal <100, Generalized edema, Chronic abdominal pain        Dose:  7.5 mg   Take 1 tablet (7.5 mg) by mouth At Bedtime   Quantity:  5 tablet   Refills:  0       * order for DME   Used for:  Obstructive sleep apnea        Equipment being ordered: CPAP   Quantity:  1 Device   Refills:  0       * order for DME   Used for:  Peripheral edema        Equipment being ordered: sharla hose   Quantity:  2 Box   Refills:  1       SUMAtriptan 5 MG/ACT nasal spray   Commonly known as:  IMITREX   Used for:  Abdominal migraine, intractable        SPRAY 1 TO 2 SPRAYS IN NOSTRIL AS NEEDED FOR MIGRAINE. MAY REPEAT IN 2 HOURS. MAX 8 SPRAYS IN 24 HOURS   Quantity:  1 each   Refills:  0       * Notice:  This list has 2 medication(s) that are the same as other medications prescribed for you. Read the directions carefully, and ask your doctor or other care provider to review them with you.      STOP taking     furosemide 20 MG tablet   Commonly known as:  LASIX                Where to get your medicines      These medications were sent to [facility] [address] [address]     Phone:  [phone]     aspirin 81 MG tablet    lisinopril 20 MG tablet                Protect others around you: Learn how to safely use, store and throw away your medicines at www.disposemymeds.org.             Medication List: This is a list of all your medications and when to take them. Check marks below indicate your daily home schedule. Keep this list as a reference.      Medications           Morning  Afternoon Evening Bedtime As Needed    aspirin 81 MG tablet   Commonly known as:  ASPIRIN LOW DOSE   Take 1 tablet (81 mg) by mouth daily                                atorvastatin 10 MG tablet   Commonly known as:  LIPITOR   TAKE 1 TABLET BY MOUTH EVERY DAY   Last time this was given:  10 mg on 1/25/2018  9:14 AM                                CYMBALTA PO   Take 20 mg by mouth daily   Last time this was given:  20 mg on 1/25/2018  9:14 AM                                divalproex 500 MG EC tablet   Commonly known as:  DEPAKOTE   Take 2 tablets (1,000 mg) by mouth daily   Last time this was given:  1,000 mg on 1/25/2018 12:32 PM                                doxepin 25 MG capsule   Commonly known as:  SINEquan   Take 2 capsules (50 mg) by mouth At Bedtime                                gabapentin 800 MG tablet   Commonly known as:  NEURONTIN   Take 1 tablet (800 mg) by mouth 3 times daily   Last time this was given:  800 mg on 1/25/2018  2:52 PM                                ketoconazole 2 % shampoo   Commonly known as:  NIZORAL   APPLY TO THE AFFECTED AREA AND WASH OFF AFTER 5 MINUTES                                lisinopril 20 MG tablet   Commonly known as:  PRINIVIL/ZESTRIL   Take 1 tablet (20 mg) by mouth daily   Last time this was given:  20 mg on 1/25/2018  9:14 AM                                metFORMIN 500 MG 24 hr tablet   Commonly known as:  GLUCOPHAGE-XR   Take 2 tablets (1,000 mg) by mouth daily (with dinner)                                OLANZapine 7.5 MG tablet   Commonly known as:  zyPREXA   Take 1 tablet (7.5 mg) by mouth At Bedtime                                * order for DME   Equipment being ordered: CPAP                                * order for DME   Equipment being ordered: sharla hose                                SUMAtriptan 5 MG/ACT nasal spray   Commonly known as:  IMITREX   SPRAY 1 TO 2 SPRAYS IN NOSTRIL AS NEEDED FOR MIGRAINE. MAY REPEAT IN 2 HOURS. MAX 8 SPRAYS IN 24 HOURS                                 * Notice:  This list has 2 medication(s) that are the same as other medications prescribed for you. Read the directions carefully, and ask your doctor or other care provider to review them with you.

## 2018-01-25 NOTE — PROGRESS NOTES
Pt here for MRI stress. Pt denies any caffeine within the last 12 hours. Lung sounds clear. EKG NSR.  Pt's CR and GFR were checked prior to scan. Pt tolerated the dose without any adverse effects.  Aminophylline 100 mg IV was given to reverse. Pt BP lower than it was on unit, vitals done and reviewed with . Pt watched in recovery post MRI and states he feels better. Report given to 6C RN

## 2018-01-25 NOTE — H&P
Cardiology  History and Physical   Date of Admission:  1/25/2018    Assessment & Plan   Parmjit Hernández is a 62 year old male who presents with chronic right upper quadrant pain, found to have elevated troponin without other signs of ACS.  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  Also obesity, NORMA, HTN, HLP, DM2, bipolar with adeel.    Elevated troponin without typical chest pain.  Could be from hypertensive crisis since BP initially was 200/120 (though clinically appears well and no other signs of end organ dysfunction) vs cardiac amyloid involvement vs ACS.  Subsequent troponin lower and EKG without acute signs of ischemia.  He had negative exercise stress echo march 2016.  CT Chest without coronary calcifications and no aortic pathology.      Will do stress cardiac MRI (look for signs of ischemia and any cardiac amyloid involvement).  Continue asa and statin and good blood pressure control.    Continue home pain control and antianxiety medications.    SSI for diabetes    Regarding abdominal pain, there is no acute pathology right now.  Unsure cause at the moment but it is certainly not urgent or emergent or surgical.  CT scans relatively benign.  Well documented to not use narcotics in this patient.    Floor Care  Fluids: none  Food: diabetic diet  Electrolyte repletion: prn   Analgesia: none  Sedation: none  DVT ppx: lovenox  Stress Ulcer ppx:  Glycemic Control:  Catheters: none  Lines: pIV  Therapies: none  Consults: none  Code Status: Full  Discharge plan: pending cardiac MRI    Pt seen and discussed with the staff attending Dr. Stephen, who agrees with the assessment and plan.    Justyn Katz MD PGY4  Cardiology Fellow  [phone]    Chief Complaint   Abdominal pain    History of Present Illness   Parmjit Hernández is a 62 year old male who presents with chronic right upper quadrant pain, found to have elevated troponin without other signs of ACS.  Pertient history of AL  amyloidosis s/p BMT December 2016 without recurrence.  Also obesity, NORMA, HTN, HLP, DM2, bipolar with adeel.    Patient presented to [place] ER overnight with abdominal pain.  States he has had the pain for years and was told initially it was from his AL amyloid though it should have improved after his transplant.  Last saw his oncologists at [facility] in June 2017, was told to follow up in 1 year.    Notes pain in RUQ, feels like burning.  There are thoughts that it was secondary to GI involvement of amyloidosis vs diverticulosis. Workup in the past includes multiple scans (CT and MRI), endoscopies and colonoscopies and capsule endoscopy.  Feels the pain comes on every 3-4 months.  Unsure what provocates or makes better.  Did state to be honest only narcotics help the pain.  No change with inspiration, eating.  Still having normal BMs.      Has seen GI in the past, Dr. Luzma Parekh at [facility].  Gastroenterology here at [facility] and at [facility].  Saw pain clinic at [facility].  At that time they had noted 33 prescriptions for controlled substances from 9 different providers from 7 different pharmacies over a 6 month period.  It was decided to taper off opioids after his autologous stem cell transplant.  It was noted at [facility] that he is only supposed to get medications from Dr. Bunch and previously caregivers have had to keep possession of the pain medication bottles.    Past Medical History    Past Medical History:   Diagnosis Date     Allergic state      Amyloidosis (H)      Diabetes mellitus (H)     type 2     Headache(784.0)      Hypertension 2007     Myalgia and myositis, unspecified      Obsessive-compulsive disorders      Other acquired absence of organ 94     Other specified viral warts      Pain in the abdomen      Pure hypercholesterolemia        Past Surgical History   Past Surgical History:   Procedure Laterality Date     BONE MARROW BIOPSY, BONE SPECIMEN, NEEDLE/TROCAR N/A 6/8/2016    Procedure: BIOPSY BONE MARROW;   Surgeon: Nathan Agrawal MD;  Location:  GI     C NONSPECIFIC PROCEDURE  94    Cholecystectomy     C NONSPECIFIC PROCEDURE  2000    repair deviated septum     CHOLECYSTECTOMY  1995    lap qian     COLONOSCOPY  2012    hx polyps     ESOPHAGOSCOPY, GASTROSCOPY, DUODENOSCOPY (EGD), COMBINED N/A 5/29/2015    Procedure: COMBINED ESOPHAGOSCOPY, GASTROSCOPY, DUODENOSCOPY (EGD), BIOPSY SINGLE OR MULTIPLE;  Surgeon: John Jacob MD;  Location:  GI     HERNIA REPAIR, UMBILICAL  2006       Prior to Admission Medications   Prior to Admission Medications   Prescriptions Last Dose Informant Patient Reported? Taking?   DULoxetine HCl (CYMBALTA PO) 1/24/2018 at Unknown time  Yes Yes   Sig: Take 10 mg by mouth   OLANZapine (ZYPREXA) 7.5 MG tablet 1/24/2018 at Unknown time  No Yes   Sig: Take 1 tablet (7.5 mg) by mouth At Bedtime   SUMAtriptan (IMITREX) 5 MG/ACT nasal spray Past Week at Unknown time  No Yes   Sig: SPRAY 1 TO 2 SPRAYS IN NOSTRIL AS NEEDED FOR MIGRAINE. MAY REPEAT IN 2 HOURS. MAX 8 SPRAYS IN 24 HOURS   atorvastatin (LIPITOR) 10 MG tablet 1/24/2018 at Unknown time  No Yes   Sig: TAKE 1 TABLET BY MOUTH EVERY DAY   divalproex (DEPAKOTE) 500 MG EC tablet 1/24/2018 at Unknown time  No Yes   Sig: Take 2 tablets (1,000 mg) by mouth daily   doxepin (SINEQUAN) 25 MG capsule 1/24/2018 at Unknown time  No Yes   Sig: Take 2 capsules (50 mg) by mouth At Bedtime   furosemide (LASIX) 20 MG tablet   No No   Sig: Take 1 tablet (20 mg) by mouth 2 times daily   Patient taking differently: Take 10 mg by mouth daily    gabapentin (NEURONTIN) 800 MG tablet 1/24/2018 at Unknown time  No Yes   Sig: Take 1 tablet (800 mg) by mouth 3 times daily   ketoconazole (NIZORAL) 2 % shampoo   No No   Sig: APPLY TO THE AFFECTED AREA AND WASH OFF AFTER 5 MINUTES   lisinopril (PRINIVIL/ZESTRIL) 10 MG tablet 1/24/2018 at Unknown time  No Yes   Sig: TAKE 1 TABLET BY MOUTH DAILY   metFORMIN (GLUCOPHAGE-XR) 500 MG 24 hr tablet  1/23/2018  No No   Sig: Take 2 tablets (1,000 mg) by mouth daily (with dinner)   order for DME   No No   Sig: Equipment being ordered: CPAP   order for DME   No No   Sig: Equipment being ordered: sharla hose      Facility-Administered Medications: None     Allergies   Allergies   Allergen Reactions     Liraglutide Other (See Comments)     Sulfa Drugs Swelling     Pt has taken  Taken sulfa drugs orally without trouble. He had problems with Sulfa eye drops. Eye swelled up     Victoza      Increased migraine frequency and severity       Social History   Social History     Social History     Marital status:      Spouse name: N/A     Number of children: 3     Years of education: N/A     Occupational History      [employer]     Social History Main Topics     Smoking status: Never Smoker     Smokeless tobacco: Never Used     Alcohol use No     Drug use: No     Sexual activity: No     Other Topics Concern     Parent/Sibling W/ Cabg, Mi Or Angioplasty Before 65f 55m? No     Social History Narrative    Social Documentation:05/27/2010        Balanced Diet: NO    Calcium intake: 3-4 per day    Caffeine: 2 per day    Exercise:  type of activity NO    Sunscreen: Yes    Seatbelts:  Yes    Self Breast Exam:  No - na    Self Testicular Exam: no    Physical/Emotional/Sexual Abuse: No     Do you feel safe in your environment? Yes        Cholesterol screen up to date: Yes    Eye Exam up to date: Yes    Dental Exam up to date: Yes    Pap smear up to date: Does Not Apply    Mammogram up to date: Does Not Apply    Dexa Scan up to date:NO    Colonoscopy up to date:2007    Immunizations up to date: YES    Glucose screen if over 40: Yes        Sofi Rosas CMA                       Family History   Family History   Problem Relation Age of Onset     CEREBROVASCULAR DISEASE Mother      C.A.D. Mother      Hypertension Mother      Alcohol/Drug Mother      Arthritis Mother      CEREBROVASCULAR DISEASE Maternal Grandmother       C.A.D. Maternal Grandmother      Alcohol/Drug Maternal Grandmother      C.A.D. Father      HEART DISEASE Father      Hypertension Father      Alcohol/Drug Father      Allergies Father      Circulatory Father      Depression Father      Respiratory Father      Asthma Father      Alcohol/Drug Paternal Grandfather      CEREBROVASCULAR DISEASE Paternal Grandfather      Depression Son      Psychotic Disorder Son      anxiety disorder     Depression Daughter      CEREBROVASCULAR DISEASE Maternal Grandfather      CEREBROVASCULAR DISEASE Paternal Grandmother      DIABETES Brother      Allergies Sister      Depression Sister      Gynecology Sister      Coronary Artery Disease No family hx of      Hyperlipidemia No family hx of      Breast Cancer No family hx of      Colon Cancer No family hx of      Prostate Cancer No family hx of      Other Cancer No family hx of      Anxiety Disorder No family hx of      MENTAL ILLNESS No family hx of      Substance Abuse No family hx of      Anesthesia Reaction No family hx of      OSTEOPOROSIS No family hx of      Genetic Disorder No family hx of      Thyroid Disease No family hx of      Obesity No family hx of      Unknown/Adopted No family hx of        Review of Systems   The 10 point Review of Systems is negative other than noted in the HPI or here.     Physical Exam   Temp: 98  F (36.7  C) Temp src: Oral BP: (!) 178/118 (RN Notified)   Heart Rate: 89 Resp: 20 SpO2: 96 % O2 Device: None (Room air)    Vital Signs with Ranges  Temp:  [97.8  F (36.6  C)-98  F (36.7  C)] 98  F (36.7  C)  Heart Rate:  [] 89  Resp:  [15-22] 20  BP: (128-205)/() 178/118  SpO2:  [91 %-99 %] 96 %  248 lbs 3.2 oz    GEN:  Alert, interactive, appears comfortable, NAD.  HEENT:  Normocephalic/atraumatic, no scleral icterus, no nasal discharge, OP clear with MMM.  CV:  Regular rate and rhythm, no murmur or JVD.   LUNGS:  On room air, normal work of breathing, Clear to auscultation bilaterally, no  wheezes or crackles.   ABD:  Obese, Active bowel sounds, soft, TTP epigastric region.  No rebound/guarding/rigidity.  EXT:  No edema or cyanosis.  Hands/feet warm to touch with good signs of peripheral perfusion.   SKIN:  Dry to touch, no exanthems noted in the visualized areas.  NEURO:  Alert and oriented, no new focal deficits appreciated.  PSCYH: Normal mood and affect    Data     EKG: small r waves inferiorly and RBBB (old)    Recent Labs  Lab 01/25/18  0633 01/25/18  0413 01/18/18  0900   WBC  --  8.3 12.1*   HGB  --  12.9* 13.8   MCV  --  97 95   PLT  --  112* 158   NA  --  134 131*   POTASSIUM  --  4.2 3.7   CHLORIDE  --  99 94   CO2  --  26 24   BUN  --  15 24   CR  --  0.83 0.82   ANIONGAP  --  9 13   LUIS  --  9.5 10.1   GLC  --  256* 261*   ALBUMIN  --  3.6 4.0   PROTTOTAL  --  6.7* 7.7   BILITOTAL  --  0.8 1.4*   ALKPHOS  --  90 100   ALT  --  30 37   AST  --  12 19   LIPASE  --  94 66*   TROPI 0.145* 0.154*  --        Recent Results (from the past 24 hour(s))   CT Chest Abdomen w Contrast    Narrative    CT CHEST AND ABDOMEN WITH CONTRAST  1/25/2018 5:59 AM     HISTORY: High blood pressure, chest/abdominal pain. Evaluate aortic  dissection.    TECHNIQUE: Volumetric acquisition through the chest and abdomen with  IV contrast. 75 mL Isovue-370. Radiation dose for this scan was  reduced using automated exposure control, adjustment of the mA and/or  kV according to patient size, or iterative reconstruction technique.    COMPARISON: Abdominal CT 1/18/2018. Chest CT 4/20/2016.    FINDINGS:  Chest: Mild dependent atelectasis. No acute infiltrates or pleural  effusions. 0.6 cm right lower lung nodule is stable. This is unchanged  dating back to 4/20/2016 and is therefore likely benign. The  previously seen tiny left basilar nodule is likely obscured by  atelectasis. Mild ectasia of the thoracic aorta. No thoracic aortic  aneurysm or dissection. Heart size within normal limits. There is mild  mediastinal, hilar  and axillary adenopathy, not significantly changed.    Abdomen and pelvis: Mildly prominent liver with diffuse fatty  infiltration. Gallbladder is absent. Mild splenomegaly with the spleen  measuring 15 cm craniocaudad. This was also present previously.  Pancreas, adrenal glands, and kidneys demonstrate no worrisome  findings. Small low-dense left renal lesions, too small to  characterize accurately but stable. Mild atheromatous changes of the  abdominal aorta without aneurysm or dissection. Mild upper abdominal  and retroperitoneal adenopathy is stable. No dilated bowel loops,  ascites, or other acute findings in the abdomen. The pelvis was not  scanned. Mild degenerative and hypertrophic changes in the visualized  spine.      Impression    IMPRESSION:  1. No aortic dissection. No other acute findings.  2. Stable mild adenopathy in the chest and abdomen.  3. Mildly prominent liver with fatty infiltration and mild  splenomegaly.    JOSE LAU MD             ATTENDING NOTE:  Patient has been seen and evaluated by me on 01/25/2018. I have reviewed the H&P.  Please refer to it for additional details.  I have reviewed today's vital signs, medications, labs, and imaging results.  I have reviewed and edited, as necessary, the history, review of systems, physical examination, and assessment and plan.  I have discussed my assessment and plan with the cardiology fellow.  Parmjit Hernández is a 62 year old male with risk factor profile (+) HTN, Type II DM, (+) hypercholesterolemia, (-) tobacco use, (+) fam Hx premature CAD, Hx amyloidosis s/p BMT ([facility], Dec 2016), s/p CCY, presented to [facility] ER today with RUQ abdominal pain.  This problem dates back years and he has had extensive w/u including US, CT, MRI, EGD, pill cam, and colonoscopy without defined diagnosis.  The EMR documents pain seeking behavior, prescription drug abuse, and pain clinics.  Hypertensive on arrival, /120 mm Hg.  IV  Labetalol & IV hydralazine.   Troponins 0.15, 0.14.  ECG shows small r waves inferiorly and RBBB (old).  Exam documented above.  Plan on stress cardiac MRI to assess WMA, ischemia (including microvascular), and infarction. ASA, ACE-I, statin, BB.  I suspect the elevated troponin represents myocardial injury secondary to small vessel disease.      Kt Stephen MD     Cardiovascular Division

## 2018-01-26 ENCOUNTER — APPOINTMENT (OUTPATIENT)
Dept: CT IMAGING | Facility: CLINIC | Age: 63
End: 2018-01-26
Attending: EMERGENCY MEDICINE
Payer: MEDICARE

## 2018-01-26 ENCOUNTER — APPOINTMENT (OUTPATIENT)
Dept: GENERAL RADIOLOGY | Facility: CLINIC | Age: 63
End: 2018-01-26
Attending: EMERGENCY MEDICINE
Payer: MEDICARE

## 2018-01-26 ENCOUNTER — CARE COORDINATION (OUTPATIENT)
Dept: CARE COORDINATION | Facility: CLINIC | Age: 63
End: 2018-01-26

## 2018-01-26 ENCOUNTER — TELEPHONE (OUTPATIENT)
Dept: FAMILY MEDICINE | Facility: CLINIC | Age: 63
End: 2018-01-26

## 2018-01-26 ENCOUNTER — HOSPITAL ENCOUNTER (EMERGENCY)
Facility: CLINIC | Age: 63
Discharge: HOME | End: 2018-01-27
Attending: EMERGENCY MEDICINE | Admitting: EMERGENCY MEDICINE
Payer: MEDICARE

## 2018-01-26 ENCOUNTER — APPOINTMENT (OUTPATIENT)
Dept: ULTRASOUND IMAGING | Facility: CLINIC | Age: 63
End: 2018-01-26
Attending: EMERGENCY MEDICINE
Payer: MEDICARE

## 2018-01-26 DIAGNOSIS — R07.9 CHEST PAIN, UNSPECIFIED TYPE: ICD-10-CM

## 2018-01-26 LAB
ALBUMIN SERPL-MCNC: 3.7 G/DL (ref 3.4–5)
ALP SERPL-CCNC: 80 U/L (ref 40–150)
ALT SERPL W P-5'-P-CCNC: 24 U/L (ref 0–70)
ANION GAP SERPL CALCULATED.3IONS-SCNC: 10 MMOL/L (ref 3–14)
AST SERPL W P-5'-P-CCNC: 9 U/L (ref 0–45)
BASOPHILS # BLD AUTO: 0 10E9/L (ref 0–0.2)
BASOPHILS NFR BLD AUTO: 0.3 %
BILIRUB SERPL-MCNC: 0.9 MG/DL (ref 0.2–1.3)
BUN SERPL-MCNC: 23 MG/DL (ref 7–30)
CALCIUM SERPL-MCNC: 8.6 MG/DL (ref 8.5–10.1)
CHLORIDE SERPL-SCNC: 95 MMOL/L (ref 94–109)
CO2 SERPL-SCNC: 26 MMOL/L (ref 20–32)
CREAT SERPL-MCNC: 0.89 MG/DL (ref 0.66–1.25)
DIFFERENTIAL METHOD BLD: ABNORMAL
EOSINOPHIL # BLD AUTO: 0.1 10E9/L (ref 0–0.7)
EOSINOPHIL NFR BLD AUTO: 1 %
ERYTHROCYTE [DISTWIDTH] IN BLOOD BY AUTOMATED COUNT: 13.9 % (ref 10–15)
GFR SERPL CREATININE-BSD FRML MDRD: 86 ML/MIN/1.7M2
GLUCOSE SERPL-MCNC: 205 MG/DL (ref 70–99)
HCT VFR BLD AUTO: 34.1 % (ref 40–53)
HGB BLD-MCNC: 12.3 G/DL (ref 13.3–17.7)
IMM GRANULOCYTES # BLD: 0 10E9/L (ref 0–0.4)
IMM GRANULOCYTES NFR BLD: 0.2 %
LYMPHOCYTES # BLD AUTO: 1.2 10E9/L (ref 0.8–5.3)
LYMPHOCYTES NFR BLD AUTO: 13.4 %
MCH RBC QN AUTO: 34.9 PG (ref 26.5–33)
MCHC RBC AUTO-ENTMCNC: 36.1 G/DL (ref 31.5–36.5)
MCV RBC AUTO: 97 FL (ref 78–100)
MONOCYTES # BLD AUTO: 0.9 10E9/L (ref 0–1.3)
MONOCYTES NFR BLD AUTO: 9.6 %
NEUTROPHILS # BLD AUTO: 6.7 10E9/L (ref 1.6–8.3)
NEUTROPHILS NFR BLD AUTO: 75.5 %
NRBC # BLD AUTO: 0 10*3/UL
NRBC BLD AUTO-RTO: 0 /100
PLATELET # BLD AUTO: 137 10E9/L (ref 150–450)
POTASSIUM SERPL-SCNC: 4.2 MMOL/L (ref 3.4–5.3)
PROT SERPL-MCNC: 6.7 G/DL (ref 6.8–8.8)
RBC # BLD AUTO: 3.52 10E12/L (ref 4.4–5.9)
SODIUM SERPL-SCNC: 131 MMOL/L (ref 133–144)
TROPONIN I BLD-MCNC: 0 UG/L (ref 0–0.1)
TROPONIN I SERPL-MCNC: 0.06 UG/L (ref 0–0.04)
WBC # BLD AUTO: 8.9 10E9/L (ref 4–11)

## 2018-01-26 PROCEDURE — 25000132 ZZH RX MED GY IP 250 OP 250 PS 637: Mod: GY | Performed by: EMERGENCY MEDICINE

## 2018-01-26 PROCEDURE — 76705 ECHO EXAM OF ABDOMEN: CPT

## 2018-01-26 PROCEDURE — 85025 COMPLETE CBC W/AUTO DIFF WBC: CPT | Performed by: EMERGENCY MEDICINE

## 2018-01-26 PROCEDURE — 80053 COMPREHEN METABOLIC PANEL: CPT | Performed by: EMERGENCY MEDICINE

## 2018-01-26 PROCEDURE — 96375 TX/PRO/DX INJ NEW DRUG ADDON: CPT | Mod: XS | Performed by: EMERGENCY MEDICINE

## 2018-01-26 PROCEDURE — A9270 NON-COVERED ITEM OR SERVICE: HCPCS | Mod: GY | Performed by: EMERGENCY MEDICINE

## 2018-01-26 PROCEDURE — 96376 TX/PRO/DX INJ SAME DRUG ADON: CPT | Mod: XS | Performed by: EMERGENCY MEDICINE

## 2018-01-26 PROCEDURE — 25000128 H RX IP 250 OP 636: Performed by: EMERGENCY MEDICINE

## 2018-01-26 PROCEDURE — 84484 ASSAY OF TROPONIN QUANT: CPT | Performed by: EMERGENCY MEDICINE

## 2018-01-26 PROCEDURE — 99285 EMERGENCY DEPT VISIT HI MDM: CPT | Mod: 25 | Performed by: EMERGENCY MEDICINE

## 2018-01-26 PROCEDURE — 71045 X-RAY EXAM CHEST 1 VIEW: CPT

## 2018-01-26 PROCEDURE — 74177 CT ABD & PELVIS W/CONTRAST: CPT

## 2018-01-26 PROCEDURE — 93010 ELECTROCARDIOGRAM REPORT: CPT | Mod: Z6 | Performed by: EMERGENCY MEDICINE

## 2018-01-26 PROCEDURE — 96374 THER/PROPH/DIAG INJ IV PUSH: CPT | Mod: XS | Performed by: EMERGENCY MEDICINE

## 2018-01-26 PROCEDURE — 84484 ASSAY OF TROPONIN QUANT: CPT

## 2018-01-26 RX ORDER — LABETALOL HYDROCHLORIDE 5 MG/ML
10 INJECTION, SOLUTION INTRAVENOUS ONCE
Status: COMPLETED | OUTPATIENT
Start: 2018-01-26 | End: 2018-01-26

## 2018-01-26 RX ORDER — HYDROMORPHONE HYDROCHLORIDE 1 MG/ML
0.5 INJECTION, SOLUTION INTRAMUSCULAR; INTRAVENOUS; SUBCUTANEOUS ONCE
Status: COMPLETED | OUTPATIENT
Start: 2018-01-26 | End: 2018-01-26

## 2018-01-26 RX ORDER — IOPAMIDOL 755 MG/ML
100 INJECTION, SOLUTION INTRAVASCULAR ONCE
Status: COMPLETED | OUTPATIENT
Start: 2018-01-26 | End: 2018-01-26

## 2018-01-26 RX ORDER — LISINOPRIL 20 MG/1
20 TABLET ORAL DAILY
Status: DISCONTINUED | OUTPATIENT
Start: 2018-01-26 | End: 2018-01-27 | Stop reason: HOSPADM

## 2018-01-26 RX ORDER — NITROGLYCERIN 0.4 MG/1
0.4 TABLET SUBLINGUAL EVERY 5 MIN PRN
Status: DISCONTINUED | OUTPATIENT
Start: 2018-01-26 | End: 2018-01-27 | Stop reason: HOSPADM

## 2018-01-26 RX ADMIN — LISINOPRIL 20 MG: 20 TABLET ORAL at 22:25

## 2018-01-26 RX ADMIN — Medication 0.5 MG: at 22:30

## 2018-01-26 RX ADMIN — Medication 10 MG: at 19:08

## 2018-01-26 RX ADMIN — NITROGLYCERIN 0.4 MG: 0.4 TABLET SUBLINGUAL at 19:55

## 2018-01-26 RX ADMIN — Medication 0.5 MG: at 20:15

## 2018-01-26 RX ADMIN — NITROGLYCERIN 0.4 MG: 0.4 TABLET SUBLINGUAL at 22:26

## 2018-01-26 RX ADMIN — IOPAMIDOL 100 ML: 755 INJECTION, SOLUTION INTRAVENOUS at 23:42

## 2018-01-26 RX ADMIN — NITROGLYCERIN 0.4 MG: 0.4 TABLET SUBLINGUAL at 19:50

## 2018-01-26 ASSESSMENT — ENCOUNTER SYMPTOMS
NAUSEA: 1
DIFFICULTY URINATING: 0
FREQUENCY: 0
DIARRHEA: 0
VOMITING: 0
SHORTNESS OF BREATH: 0
DYSURIA: 0
ABDOMINAL PAIN: 1

## 2018-01-26 NOTE — TELEPHONE ENCOUNTER
Care coordination referral reviewed.  Upon review it is noted the patient s needs can be met by the primary care team.  He is followed by [facility].  Referral has been routed to appropriate team member to assist the patient.   Active care coordination is not needed at this time.     China Toro, PRINCESSN, RN, N  Clinic Care Coordinator  [facility]  [phone]

## 2018-01-26 NOTE — TELEPHONE ENCOUNTER
ED/Discharge Protocol    Routing to care coordination  Patient in ER/Hospital 4+ times in past year  Stacie MAYA RN

## 2018-01-26 NOTE — ED AVS SNAPSHOT
[facility], Emergency Department    [address]    [facility] [address]    Phone:  [phone]    Fax:  [phone]                                       Parmjit Hernández   MRN: [id number]    Department:  [facility], Emergency Department   Date of Visit:  1/26/2018           Patient Information     Date Of Birth          1955        Your diagnoses for this visit were:     None       You were seen by Malena Means MD.      Follow-up Information     Follow up with Mariebth Ardon MD. Call in 1 day.    Specialty:  Family Practice    Contact information:    [address] [facility] [id number] [phone]          Discharge Instructions          *CHEST PAIN, UNCERTAIN CAUSE    Based on your exam today, the exact cause of your chest pain is not certain. Your condition does not seem serious at this time, and your pain does not appear to be coming from your heart. However, sometimes the signs of a serious problem take more time to appear. Therefore, watch for the warning signs listed below.  HOME CARE:  1. Rest today and avoid strenuous activity.  2. Take any prescribed medicine as directed.  FOLLOW UP with your doctor in 1-3 days.   GET PROMPT MEDICAL ATTENTION if any of the following occur:    A change in the type of pain: if it feels different, becomes more severe, lasts longer, or begins to spread into your shoulder, arm, neck, jaw or back    Shortness of breath or increased pain with breathing    Weakness, dizziness, or fainting    Cough with blood or dark colored sputum (phlegm)    Fever over 101  F (38.3  C)    Swelling, pain or redness in one leg    4894-7308 [employer]. [address] [place] [address] 12110. All rights reserved. This information is not intended as a substitute for professional medical care. Always follow your healthcare professional's instructions.  This information has been modified by your health care provider with permission from the  publisher.      24 Hour Appointment Hotline       To make an appointment at any [facility], call [phone] ([phone]). If you don't have a family doctor or clinic, we will help you find one. [facility] clinics are conveniently located to serve the needs of you and your family.             Review of your medicines      Our records show that you are taking the medicines listed below. If these are incorrect, please call your family doctor or clinic.        Dose / Directions Last dose taken    aspirin 81 MG tablet   Commonly known as:  ASPIRIN LOW DOSE   Dose:  81 mg   Quantity:  30 tablet        Take 1 tablet (81 mg) by mouth daily   Refills:  3        atorvastatin 10 MG tablet   Commonly known as:  LIPITOR   Quantity:  210 tablet        TAKE 1 TABLET BY MOUTH EVERY DAY   Refills:  0        CYMBALTA PO   Dose:  20 mg        Take 20 mg by mouth daily   Refills:  0        divalproex 500 MG EC tablet   Commonly known as:  DEPAKOTE   Dose:  1000 mg   Quantity:  10 tablet        Take 2 tablets (1,000 mg) by mouth daily   Refills:  0        doxepin 25 MG capsule   Commonly known as:  SINEquan   Dose:  50 mg   Quantity:  10 capsule        Take 2 capsules (50 mg) by mouth At Bedtime   Refills:  0        gabapentin 800 MG tablet   Commonly known as:  NEURONTIN   Dose:  800 mg   Quantity:  90 tablet        Take 1 tablet (800 mg) by mouth 3 times daily   Refills:  11        ketoconazole 2 % shampoo   Commonly known as:  NIZORAL   Quantity:  120 mL        APPLY TO THE AFFECTED AREA AND WASH OFF AFTER 5 MINUTES   Refills:  2        lisinopril 20 MG tablet   Commonly known as:  PRINIVIL/ZESTRIL   Dose:  20 mg   Quantity:  90 tablet        Take 1 tablet (20 mg) by mouth daily   Refills:  3        metFORMIN 500 MG 24 hr tablet   Commonly known as:  GLUCOPHAGE-XR   Dose:  1000 mg   Quantity:  180 tablet        Take 2 tablets (1,000 mg) by mouth daily (with dinner)   Refills:  1        OLANZapine 7.5 MG tablet   Commonly  known as:  zyPREXA   Dose:  7.5 mg   Quantity:  5 tablet        Take 1 tablet (7.5 mg) by mouth At Bedtime   Refills:  0        * order for DME   Quantity:  1 Device        Equipment being ordered: CPAP   Refills:  0        * order for DME   Quantity:  2 Box        Equipment being ordered: sharla hose   Refills:  1        SUMAtriptan 5 MG/ACT nasal spray   Commonly known as:  IMITREX   Quantity:  1 each        SPRAY 1 TO 2 SPRAYS IN NOSTRIL AS NEEDED FOR MIGRAINE. MAY REPEAT IN 2 HOURS. MAX 8 SPRAYS IN 24 HOURS   Refills:  0        * Notice:  This list has 2 medication(s) that are the same as other medications prescribed for you. Read the directions carefully, and ask your doctor or other care provider to review them with you.            Procedures and tests performed during your visit     Abdomen US, limited (RUQ only)    CBC with platelets differential    CT Chest/Abdomen/Pelvis w Contrast    Comprehensive metabolic panel    EKG 12 lead    EKG 12-lead, tracing only    ISTAT troponin nursing POCT    Peripheral IV catheter    Troponin I    Troponin I (second draw)    Troponin POCT    XR Chest Port 1 View      Orders Needing Specimen Collection     None      Pending Results     Date and Time Order Name Status Description    1/26/2018 2224 CT Chest/Abdomen/Pelvis w Contrast Preliminary     1/26/2018 1822 EKG 12 lead Preliminary     1/25/2018 1402 EKG 12-lead, complete Preliminary     1/25/2018 0751 EKG 12-lead, complete Preliminary             Pending Culture Results     No orders found for last 3 day(s).            Pending Results Instructions     If you had any lab results that were not finalized at the time of your Discharge, you can call the ED Lab Result RN at [phone]. You will be contacted by this team for any positive Lab results or changes in treatment. The nurses are available 7 days a week from 10A to 6:30P.  You can leave a message 24 hours per day and they will return your call.        Thank you for  choosing [facility]       Thank you for choosing [facility] for your care. Our goal is always to provide you with excellent care. Hearing back from our patients is one way we can continue to improve our services. Please take a few minutes to complete the written survey that you may receive in the mail after you visit with us. Thank you!        [facility] Information     [employer] gives you secure access to your electronic health record. If you see a primary care provider, you can also send messages to your care team and make appointments. If you have questions, please call your primary care clinic.  If you do not have a primary care provider, please call [phone] and they will assist you.        Care EveryWhere ID     This is your Care EveryWhere ID. This could be used by other organizations to access your [facility] medical records  [id number]        Equal Access to Services     JUSTYNA FRIEDMAN : Lyle Blevins, steve pierce, osbaldo alves, glenis williamson. So [facility] [phone].    ATENCIÓN: Si habla español, tiene a agudelo disposición servicios gratuitos de asistencia lingüística. Llame al [phone].    We comply with applicable federal civil rights laws and [place] laws. We do not discriminate on the basis of race, color, national origin, age, disability, sex, sexual orientation, or gender identity.            After Visit Summary       This is your record. Keep this with you and show to your community pharmacist(s) and doctor(s) at your next visit.

## 2018-01-26 NOTE — TELEPHONE ENCOUNTER
Hospital F/U 1/25/2018 DX:Hypertensive Emergency, Hyperlipidemia Ldl Goal <100 ED/IP 8/0    [phone] (home)

## 2018-01-26 NOTE — ED AVS SNAPSHOT
[facility], [place], Emergency Department    [address]    [facility] [address]    Phone:  [phone]    Fax:  [phone]                                       Parmjit Hernández   MRN: [id number]    Department:  [facility], Emergency Department   Date of Visit:  1/26/2018           After Visit Summary Signature Page     I have received my discharge instructions, and my questions have been answered. I have discussed any challenges I see with this plan with the nurse or doctor.    ..........................................................................................................................................  Patient/Patient Representative Signature      ..........................................................................................................................................  Patient Representative Print Name and Relationship to Patient    ..................................................               ................................................  Date                                            Time    ..........................................................................................................................................  Reviewed by Signature/Title    ...................................................              ..............................................  Date                                                            Time

## 2018-01-26 NOTE — PLAN OF CARE
Observation goal: To have normal MRI stress test.  Goal met - YES, MRI normal, pt discharged to home. Discharge instructions, medications, follow-up discussed with patient; all information understood. Paperwork signed, copy given to patient. Patient left on foot, discharged to home at about 1840.

## 2018-01-26 NOTE — DISCHARGE SUMMARY
[facility] Discharge Summary    Parmjit Hernández MRN# [id number]   Age: 62 year old YOB: 1955     Date of Admission:  1/25/2018  Date of Discharge::  1/25/2018  Admitting Physician:  Kt Stephen MD  Discharge Physician:  Kt Stephen MD          Admission Diagnoses:   Chronic abdominal pain  Hypertensive emergency          Discharge Diagnosis:   Active Problems:    Abdominal pain, chronic, right upper quadrant    Hypertension         Procedures:   No procedures performed during this admission          Medications Prior to Admission:     Prescriptions Prior to Admission   Medication Sig Dispense Refill Last Dose     DULoxetine HCl (CYMBALTA PO) Take 20 mg by mouth daily    1/24/2018 at Unknown time     SUMAtriptan (IMITREX) 5 MG/ACT nasal spray SPRAY 1 TO 2 SPRAYS IN NOSTRIL AS NEEDED FOR MIGRAINE. MAY REPEAT IN 2 HOURS. MAX 8 SPRAYS IN 24 HOURS 1 each 0 Past Week at Unknown time     atorvastatin (LIPITOR) 10 MG tablet TAKE 1 TABLET BY MOUTH EVERY  tablet 0 1/24/2018 at Unknown time     gabapentin (NEURONTIN) 800 MG tablet Take 1 tablet (800 mg) by mouth 3 times daily 90 tablet 11 1/24/2018 at Unknown time     divalproex (DEPAKOTE) 500 MG EC tablet Take 2 tablets (1,000 mg) by mouth daily 10 tablet 0 1/24/2018 at Unknown time     doxepin (SINEQUAN) 25 MG capsule Take 2 capsules (50 mg) by mouth At Bedtime 10 capsule 0 1/24/2018 at Unknown time     OLANZapine (ZYPREXA) 7.5 MG tablet Take 1 tablet (7.5 mg) by mouth At Bedtime 5 tablet 0 1/24/2018 at Unknown time     ketoconazole (NIZORAL) 2 % shampoo APPLY TO THE AFFECTED AREA AND WASH OFF AFTER 5 MINUTES 120 mL 2 Past Week at Unknown time     [DISCONTINUED] furosemide (LASIX) 20 MG tablet Take 1 tablet (20 mg) by mouth 2 times daily (Patient taking differently: Take 10 mg by mouth daily ) 90 tablet 1 1/17/2018 at Unknown time     metFORMIN (GLUCOPHAGE-XR) 500 MG 24 hr tablet Take 2 tablets (1,000 mg) by mouth daily (with dinner)  180 tablet 1 1/23/2018     order for DME Equipment being ordered: sharla hose 2 Box 1      order for DME Equipment being ordered: CPAP 1 Device 0 Taking             Discharge Medications:     Current Discharge Medication List      START taking these medications    Details   aspirin (ASPIRIN LOW DOSE) 81 MG tablet Take 1 tablet (81 mg) by mouth daily  Qty: 30 tablet, Refills: 3    Associated Diagnoses: Hyperlipidemia LDL goal <100; Hypertension goal BP (blood pressure) < 140/90         CONTINUE these medications which have CHANGED    Details   lisinopril (PRINIVIL/ZESTRIL) 20 MG tablet Take 1 tablet (20 mg) by mouth daily  Qty: 90 tablet, Refills: 3    Associated Diagnoses: Hypertension goal BP (blood pressure) < 140/90         CONTINUE these medications which have NOT CHANGED    Details   DULoxetine HCl (CYMBALTA PO) Take 20 mg by mouth daily       SUMAtriptan (IMITREX) 5 MG/ACT nasal spray SPRAY 1 TO 2 SPRAYS IN NOSTRIL AS NEEDED FOR MIGRAINE. MAY REPEAT IN 2 HOURS. MAX 8 SPRAYS IN 24 HOURS  Qty: 1 each, Refills: 0    Associated Diagnoses: Abdominal migraine, intractable      atorvastatin (LIPITOR) 10 MG tablet TAKE 1 TABLET BY MOUTH EVERY DAY  Qty: 210 tablet, Refills: 0    Associated Diagnoses: Hyperlipidemia LDL goal <100      gabapentin (NEURONTIN) 800 MG tablet Take 1 tablet (800 mg) by mouth 3 times daily  Qty: 90 tablet, Refills: 11    Associated Diagnoses: Chronic abdominal pain      divalproex (DEPAKOTE) 500 MG EC tablet Take 2 tablets (1,000 mg) by mouth daily  Qty: 10 tablet, Refills: 0    Associated Diagnoses: Hyperlipidemia LDL goal <100      doxepin (SINEQUAN) 25 MG capsule Take 2 capsules (50 mg) by mouth At Bedtime  Qty: 10 capsule, Refills: 0    Associated Diagnoses: Hyperlipidemia LDL goal <100      OLANZapine (ZYPREXA) 7.5 MG tablet Take 1 tablet (7.5 mg) by mouth At Bedtime  Qty: 5 tablet, Refills: 0    Associated Diagnoses: Hyperlipidemia LDL goal <100; Generalized edema; Chronic abdominal pain       ketoconazole (NIZORAL) 2 % shampoo APPLY TO THE AFFECTED AREA AND WASH OFF AFTER 5 MINUTES  Qty: 120 mL, Refills: 2    Associated Diagnoses: Tinea corporis      metFORMIN (GLUCOPHAGE-XR) 500 MG 24 hr tablet Take 2 tablets (1,000 mg) by mouth daily (with dinner)  Qty: 180 tablet, Refills: 1    Associated Diagnoses: Type 2 diabetes mellitus without complication, without long-term current use of insulin (H)      !! order for DME Equipment being ordered: sharla hose  Qty: 2 Box, Refills: 1    Associated Diagnoses: Peripheral edema      !! order for DME Equipment being ordered: CPAP  Qty: 1 Device, Refills: 0    Associated Diagnoses: Obstructive sleep apnea       !! - Potential duplicate medications found. Please discuss with provider.      STOP taking these medications       furosemide (LASIX) 20 MG tablet Comments:   Reason for Stopping:                   Consultations:   No consultations were requested during this admission         Hospital Course:     Parmjit Hernández is a 62 year old male who presents with chronic right upper quadrant pain, found to have elevated troponin without other signs of ACS.  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  Also obesity, NORMA, HTN, HLP, DM2, bipolar with adeel.    Troponin 0.15, which decreased to 0.14.  No chest pain. Thought to be from hypertensive crisis since BP initially was 200/120 in the ER (though clinically appears well and no other signs of end organ dysfunction) vs cardiac amyloid involvement vs ACS.  EKG without acute signs of ischemia.  His blood pressure was treated with restarting his lisinopril and giving IV labetalol and hydralazine.  Per review of chart, patient can be non adherent to taking his antihypertensives.    Patient underwent cardiac stress MRI which showed no signs of ischemia, no wall motion abnormality, with normal function.  Did show left ventricular hypertrophy with some mild late gadolinium enhancement.  Patient will  follow up with his primary care physician and oncologist.    Regarding abdominal pain, which is chronic in nature, there are thoughts that it was secondary to GI involvement of amyloidosis vs diverticulosis.  He had normal LFTs, lipase, and CT scans here. Workup in the past includes multiple scans (CT and MRI), endoscopies and colonoscopies and capsule endoscopy.     Has seen GI in the past, including  Dr. Luzma Parekh at [facility], gastroenterology here at [facility] and at [facility].  Patient states he will make appointment at [facility].            Discharge Instructions and Follow-Up:   Discharge diet: Diabetic (2000 ADA)   Discharge activity: Activity as tolerated   Discharge follow-up: Follow up with primary care provider in 2-4 weeks           Discharge Disposition:   Discharged to home      Justyn Katz MD       Cardiology Staff: See my attestation statement from earlier today.    Kt Stephen MD

## 2018-01-27 ENCOUNTER — APPOINTMENT (OUTPATIENT)
Dept: GENERAL RADIOLOGY | Facility: CLINIC | Age: 63
End: 2018-01-27
Attending: EMERGENCY MEDICINE
Payer: MEDICARE

## 2018-01-27 VITALS
OXYGEN SATURATION: 93 % | SYSTOLIC BLOOD PRESSURE: 142 MMHG | BODY MASS INDEX: 36.81 KG/M2 | RESPIRATION RATE: 14 BRPM | DIASTOLIC BLOOD PRESSURE: 90 MMHG | WEIGHT: 235 LBS | HEART RATE: 98 BPM | TEMPERATURE: 98.4 F

## 2018-01-27 VITALS
DIASTOLIC BLOOD PRESSURE: 106 MMHG | SYSTOLIC BLOOD PRESSURE: 147 MMHG | TEMPERATURE: 98.6 F | OXYGEN SATURATION: 98 % | HEART RATE: 99 BPM | RESPIRATION RATE: 22 BRPM

## 2018-01-27 DIAGNOSIS — R07.9 CHEST PAIN, UNSPECIFIED TYPE: ICD-10-CM

## 2018-01-27 LAB
ALBUMIN SERPL-MCNC: 3.7 G/DL (ref 3.4–5)
ALP SERPL-CCNC: 74 U/L (ref 40–150)
ALT SERPL W P-5'-P-CCNC: 27 U/L (ref 0–70)
ANION GAP SERPL CALCULATED.3IONS-SCNC: 12 MMOL/L (ref 3–14)
APTT PPP: 27 SEC (ref 22–37)
AST SERPL W P-5'-P-CCNC: 15 U/L (ref 0–45)
BASOPHILS # BLD AUTO: 0 10E9/L (ref 0–0.2)
BASOPHILS NFR BLD AUTO: 0.2 %
BILIRUB SERPL-MCNC: 0.9 MG/DL (ref 0.2–1.3)
BUN SERPL-MCNC: 22 MG/DL (ref 7–30)
CALCIUM SERPL-MCNC: 8.8 MG/DL (ref 8.5–10.1)
CHLORIDE SERPL-SCNC: 91 MMOL/L (ref 94–109)
CO2 SERPL-SCNC: 24 MMOL/L (ref 20–32)
CREAT SERPL-MCNC: 0.96 MG/DL (ref 0.66–1.25)
DIFFERENTIAL METHOD BLD: ABNORMAL
EOSINOPHIL # BLD AUTO: 0.1 10E9/L (ref 0–0.7)
EOSINOPHIL NFR BLD AUTO: 0.9 %
ERYTHROCYTE [DISTWIDTH] IN BLOOD BY AUTOMATED COUNT: 13.7 % (ref 10–15)
GFR SERPL CREATININE-BSD FRML MDRD: 80 ML/MIN/1.7M2
GLUCOSE SERPL-MCNC: 186 MG/DL (ref 70–99)
HCT VFR BLD AUTO: 34.6 % (ref 40–53)
HGB BLD-MCNC: 12.6 G/DL (ref 13.3–17.7)
IMM GRANULOCYTES # BLD: 0 10E9/L (ref 0–0.4)
IMM GRANULOCYTES NFR BLD: 0.4 %
INR PPP: 1.06 (ref 0.86–1.14)
INTERPRETATION ECG - MUSE: NORMAL
LYMPHOCYTES # BLD AUTO: 1.1 10E9/L (ref 0.8–5.3)
LYMPHOCYTES NFR BLD AUTO: 10.3 %
MCH RBC QN AUTO: 34.8 PG (ref 26.5–33)
MCHC RBC AUTO-ENTMCNC: 36.4 G/DL (ref 31.5–36.5)
MCV RBC AUTO: 96 FL (ref 78–100)
MONOCYTES # BLD AUTO: 0.9 10E9/L (ref 0–1.3)
MONOCYTES NFR BLD AUTO: 8.8 %
NEUTROPHILS # BLD AUTO: 8.3 10E9/L (ref 1.6–8.3)
NEUTROPHILS NFR BLD AUTO: 79.4 %
NRBC # BLD AUTO: 0 10*3/UL
NRBC BLD AUTO-RTO: 0 /100
NT-PROBNP SERPL-MCNC: 603 PG/ML (ref 0–900)
PLATELET # BLD AUTO: 132 10E9/L (ref 150–450)
POTASSIUM SERPL-SCNC: 4 MMOL/L (ref 3.4–5.3)
PROT SERPL-MCNC: 7 G/DL (ref 6.8–8.8)
RBC # BLD AUTO: 3.62 10E12/L (ref 4.4–5.9)
SODIUM SERPL-SCNC: 126 MMOL/L (ref 133–144)
TROPONIN I SERPL-MCNC: 0.03 UG/L (ref 0–0.04)
TROPONIN I SERPL-MCNC: 0.06 UG/L (ref 0–0.04)
WBC # BLD AUTO: 10.5 10E9/L (ref 4–11)

## 2018-01-27 PROCEDURE — 25000131 ZZH RX MED GY IP 250 OP 636 PS 637: Mod: GY | Performed by: EMERGENCY MEDICINE

## 2018-01-27 PROCEDURE — A9270 NON-COVERED ITEM OR SERVICE: HCPCS | Mod: GY | Performed by: EMERGENCY MEDICINE

## 2018-01-27 PROCEDURE — 85730 THROMBOPLASTIN TIME PARTIAL: CPT | Performed by: EMERGENCY MEDICINE

## 2018-01-27 PROCEDURE — 85025 COMPLETE CBC W/AUTO DIFF WBC: CPT | Performed by: EMERGENCY MEDICINE

## 2018-01-27 PROCEDURE — 80053 COMPREHEN METABOLIC PANEL: CPT | Performed by: EMERGENCY MEDICINE

## 2018-01-27 PROCEDURE — 99285 EMERGENCY DEPT VISIT HI MDM: CPT | Mod: 25 | Performed by: EMERGENCY MEDICINE

## 2018-01-27 PROCEDURE — 25000128 H RX IP 250 OP 636: Performed by: EMERGENCY MEDICINE

## 2018-01-27 PROCEDURE — 85610 PROTHROMBIN TIME: CPT | Performed by: EMERGENCY MEDICINE

## 2018-01-27 PROCEDURE — 96374 THER/PROPH/DIAG INJ IV PUSH: CPT | Performed by: EMERGENCY MEDICINE

## 2018-01-27 PROCEDURE — 25000132 ZZH RX MED GY IP 250 OP 250 PS 637: Mod: GY | Performed by: EMERGENCY MEDICINE

## 2018-01-27 PROCEDURE — 83880 ASSAY OF NATRIURETIC PEPTIDE: CPT | Performed by: EMERGENCY MEDICINE

## 2018-01-27 PROCEDURE — 93005 ELECTROCARDIOGRAM TRACING: CPT | Performed by: EMERGENCY MEDICINE

## 2018-01-27 PROCEDURE — 84484 ASSAY OF TROPONIN QUANT: CPT | Performed by: EMERGENCY MEDICINE

## 2018-01-27 PROCEDURE — 71045 X-RAY EXAM CHEST 1 VIEW: CPT

## 2018-01-27 PROCEDURE — 99283 EMERGENCY DEPT VISIT LOW MDM: CPT | Mod: Z6 | Performed by: EMERGENCY MEDICINE

## 2018-01-27 RX ORDER — ALUMINA, MAGNESIA, AND SIMETHICONE 2400; 2400; 240 MG/30ML; MG/30ML; MG/30ML
30 SUSPENSION ORAL ONCE
Status: COMPLETED | OUTPATIENT
Start: 2018-01-27 | End: 2018-01-27

## 2018-01-27 RX ORDER — METOCLOPRAMIDE 10 MG/1
10 TABLET ORAL
Status: DISCONTINUED | OUTPATIENT
Start: 2018-01-27 | End: 2018-01-27 | Stop reason: HOSPADM

## 2018-01-27 RX ORDER — FAMOTIDINE 10 MG
10 TABLET ORAL 2 TIMES DAILY
Status: DISCONTINUED | OUTPATIENT
Start: 2018-01-27 | End: 2018-01-27 | Stop reason: HOSPADM

## 2018-01-27 RX ORDER — KETOROLAC TROMETHAMINE 30 MG/ML
30 INJECTION, SOLUTION INTRAMUSCULAR; INTRAVENOUS ONCE
Status: COMPLETED | OUTPATIENT
Start: 2018-01-27 | End: 2018-01-27

## 2018-01-27 RX ADMIN — METOCLOPRAMIDE HYDROCHLORIDE 10 MG: 10 TABLET ORAL at 19:25

## 2018-01-27 RX ADMIN — FAMOTIDINE 10 MG: 10 TABLET ORAL at 19:25

## 2018-01-27 RX ADMIN — KETOROLAC TROMETHAMINE 30 MG: 30 INJECTION, SOLUTION INTRAMUSCULAR at 18:40

## 2018-01-27 RX ADMIN — ALUMINUM HYDROXIDE, MAGNESIUM HYDROXIDE, AND DIMETHICONE 30 ML: 400; 400; 40 SUSPENSION ORAL at 18:42

## 2018-01-27 NOTE — ED AVS SNAPSHOT
[facility], Emergency Department    [address] [address]    Phone:  [phone]                                       Parmjit Hernández   MRN: [id number]    Department:  [facility], Emergency Department   Date of Visit:  1/27/2018           Patient Information     Date Of Birth          1955        Your diagnoses for this visit were:     Chest pain, unspecified type        You were seen by Malena Means MD.        Discharge Instructions       Please make an appointment to follow up with Pain Clinic (phone: [phone]) as soon as possible.      24 Hour Appointment Hotline       To make an appointment at any [facility] clinic, call [phone] ([phone]). If you don't have a family doctor or clinic, we will help you find one. [facility] clinics are conveniently located to serve the needs of you and your family.             Review of your medicines      Our records show that you are taking the medicines listed below. If these are incorrect, please call your family doctor or clinic.        Dose / Directions Last dose taken    aspirin 81 MG tablet   Commonly known as:  ASPIRIN LOW DOSE   Dose:  81 mg   Quantity:  30 tablet        Take 1 tablet (81 mg) by mouth daily   Refills:  3        atorvastatin 10 MG tablet   Commonly known as:  LIPITOR   Quantity:  210 tablet        TAKE 1 TABLET BY MOUTH EVERY DAY   Refills:  0        CYMBALTA PO   Dose:  20 mg        Take 20 mg by mouth daily   Refills:  0        divalproex 500 MG EC tablet   Commonly known as:  DEPAKOTE   Dose:  1000 mg   Quantity:  10 tablet        Take 2 tablets (1,000 mg) by mouth daily   Refills:  0        doxepin 25 MG capsule   Commonly known as:  SINEquan   Dose:  50 mg   Quantity:  10 capsule        Take 2 capsules (50 mg) by mouth At Bedtime   Refills:  0        gabapentin 800 MG tablet   Commonly known as:  NEURONTIN   Dose:  800 mg   Quantity:  90 tablet        Take 1 tablet (800 mg) by mouth 3 times daily    Refills:  11        ketoconazole 2 % shampoo   Commonly known as:  NIZORAL   Quantity:  120 mL        APPLY TO THE AFFECTED AREA AND WASH OFF AFTER 5 MINUTES   Refills:  2        lisinopril 20 MG tablet   Commonly known as:  PRINIVIL/ZESTRIL   Dose:  20 mg   Quantity:  90 tablet        Take 1 tablet (20 mg) by mouth daily   Refills:  3        metFORMIN 500 MG 24 hr tablet   Commonly known as:  GLUCOPHAGE-XR   Dose:  1000 mg   Quantity:  180 tablet        Take 2 tablets (1,000 mg) by mouth daily (with dinner)   Refills:  1        OLANZapine 7.5 MG tablet   Commonly known as:  zyPREXA   Dose:  7.5 mg   Quantity:  5 tablet        Take 1 tablet (7.5 mg) by mouth At Bedtime   Refills:  0        * order for DME   Quantity:  1 Device        Equipment being ordered: CPAP   Refills:  0        * order for DME   Quantity:  2 Box        Equipment being ordered: sharla hose   Refills:  1        SUMAtriptan 5 MG/ACT nasal spray   Commonly known as:  IMITREX   Quantity:  1 each        SPRAY 1 TO 2 SPRAYS IN NOSTRIL AS NEEDED FOR MIGRAINE. MAY REPEAT IN 2 HOURS. MAX 8 SPRAYS IN 24 HOURS   Refills:  0        * Notice:  This list has 2 medication(s) that are the same as other medications prescribed for you. Read the directions carefully, and ask your doctor or other care provider to review them with you.            Procedures and tests performed during your visit     CBC with platelets differential    Comprehensive metabolic panel    EKG 12-lead, tracing only    INR    Nt probnp inpatient    Partial thromboplastin time    Peripheral IV catheter    Troponin I    XR Chest Port 1 View      Orders Needing Specimen Collection     None      Pending Results     No orders found from 1/25/2018 to 1/28/2018.            Pending Culture Results     No orders found from 1/25/2018 to 1/28/2018.            Pending Results Instructions     If you had any lab results that were not finalized at the time of your Discharge, you can call the ED Lab Result  RN at [phone]. You will be contacted by this team for any positive Lab results or changes in treatment. The nurses are available 7 days a week from 10A to 6:30P.  You can leave a message 24 hours per day and they will return your call.        Thank you for choosing [facility]       Thank you for choosing [facility] for your care. Our goal is always to provide you with excellent care. Hearing back from our patients is one way we can continue to improve our services. Please take a few minutes to complete the written survey that you may receive in the mail after you visit with us. Thank you!        [employer] Information     [facility] gives you secure access to your electronic health record. If you see a primary care provider, you can also send messages to your care team and make appointments. If you have questions, please call your primary care clinic.  If you do not have a primary care provider, please call [phone] and they will assist you.        Care EveryWhere ID     This is your Care EveryWhere ID. This could be used by other organizations to access your [facility] medical records  [id number]        Equal Access to Services     JUSTYNA FRIEDMAN : Hadii freeman Blevins, warozina pierce, qawily alves, glenis mckenzie . So [facility] [phone].    ATENCIÓN: Si habla español, tiene a agudelo disposición servicios gratuitos de asistencia lingüística. Llame al [phone].    We comply with applicable federal civil rights laws and [place] laws. We do not discriminate on the basis of race, color, national origin, age, disability, sex, sexual orientation, or gender identity.            After Visit Summary       This is your record. Keep this with you and show to your community pharmacist(s) and doctor(s) at your next visit.

## 2018-01-27 NOTE — DISCHARGE INSTRUCTIONS
*CHEST PAIN, UNCERTAIN CAUSE    Based on your exam today, the exact cause of your chest pain is not certain. Your condition does not seem serious at this time, and your pain does not appear to be coming from your heart. However, sometimes the signs of a serious problem take more time to appear. Therefore, watch for the warning signs listed below.  HOME CARE:  1. Rest today and avoid strenuous activity.  2. Take any prescribed medicine as directed.  FOLLOW UP with your doctor in 1-3 days.   GET PROMPT MEDICAL ATTENTION if any of the following occur:    A change in the type of pain: if it feels different, becomes more severe, lasts longer, or begins to spread into your shoulder, arm, neck, jaw or back    Shortness of breath or increased pain with breathing    Weakness, dizziness, or fainting    Cough with blood or dark colored sputum (phlegm)    Fever over 101  F (38.3  C)    Swelling, pain or redness in one leg    7261-4039 [employer]. [address] [place] [address]. All rights reserved. This information is not intended as a substitute for professional medical care. Always follow your healthcare professional's instructions.  This information has been modified by your health care provider with permission from the publisher.

## 2018-01-27 NOTE — ED AVS SNAPSHOT
[facility], [place], Emergency Department    [address] [place] [address]    Phone:  [phone]                                       Parmjit Hernández   MRN: [id number]    Department:  [facility], Emergency Department   Date of Visit:  1/27/2018           After Visit Summary Signature Page     I have received my discharge instructions, and my questions have been answered. I have discussed any challenges I see with this plan with the nurse or doctor.    ..........................................................................................................................................  Patient/Patient Representative Signature      ..........................................................................................................................................  Patient Representative Print Name and Relationship to Patient    ..................................................               ................................................  Date                                            Time    ..........................................................................................................................................  Reviewed by Signature/Title    ...................................................              ..............................................  Date                                                            Time

## 2018-01-27 NOTE — ED PROVIDER NOTES
History     Chief Complaint   Patient presents with     Chest Pain     HPI  Parmjit Hernández is a 62 year old male with history of hypertension, DM type II, AL amyloidosis status post BMT in December 2016, NORMA, bipolar disorder with adeel, chronic abdominal pain, and narcotic dependence who presents with chest pain.  The patient presented to the emergency department yesterday with chronic right upper abdominal pain and was found to be hypertensive with an elevated troponin.  He was admitted to the ED observation unit was largely negative workup including EKG without acute signs of ischemia.  His blood pressure was treated with restarting his lisinopril and giving IV labetalol and hydralazine.  His hypertension was thought likely due to nonadherence to taking his antihypertensives.  Patient also underwent cardiac stress MRI which showed no signs of ischemia, no wall motion abnormality, and normal function.  His abdominal pain was thought to be secondary to GI involvement of amyloidosis versus diverticulitis.    Today, the patient reports he experienced  gradual onset of bilateral chest pain, worse on the left, this morning around 7 AM (13 hours ago).  He describes a chest pain as constant, sharp pressure that radiates through to his back and shoulders.  He states the pain is currently rated at 10/10.  He states he had some shortness of breath earlier, but not now along with intermittent nausea, but no vomiting.  He states he also continues to have severe right upper quadrant abdominal pain.  He denies any diarrhea or urinary symptoms.    Past Medical History:   Diagnosis Date     Allergic state      Amyloidosis (H)      Diabetes mellitus (H)     type 2     Headache(784.0)      Hypertension 2007     Myalgia and myositis, unspecified      Obsessive-compulsive disorders      Other acquired absence of organ 94     Other specified viral warts      Pain in the abdomen      Pure hypercholesterolemia        Past Surgical  History:   Procedure Laterality Date     BONE MARROW BIOPSY, BONE SPECIMEN, NEEDLE/TROCAR N/A 6/8/2016    Procedure: BIOPSY BONE MARROW;  Surgeon: Nathan Agrawal MD;  Location:  GI     C NONSPECIFIC PROCEDURE  94    Cholecystectomy     C NONSPECIFIC PROCEDURE  2000    repair deviated septum     CHOLECYSTECTOMY  1995    lap qian     COLONOSCOPY  2012    hx polyps     ESOPHAGOSCOPY, GASTROSCOPY, DUODENOSCOPY (EGD), COMBINED N/A 5/29/2015    Procedure: COMBINED ESOPHAGOSCOPY, GASTROSCOPY, DUODENOSCOPY (EGD), BIOPSY SINGLE OR MULTIPLE;  Surgeon: John Jacob MD;  Location:  GI     HERNIA REPAIR, UMBILICAL  2006       Family History   Problem Relation Age of Onset     CEREBROVASCULAR DISEASE Mother      C.A.D. Mother      Hypertension Mother      Alcohol/Drug Mother      Arthritis Mother      CEREBROVASCULAR DISEASE Maternal Grandmother      C.A.D. Maternal Grandmother      Alcohol/Drug Maternal Grandmother      C.A.D. Father      HEART DISEASE Father      Hypertension Father      Alcohol/Drug Father      Allergies Father      Circulatory Father      Depression Father      Respiratory Father      Asthma Father      Alcohol/Drug Paternal Grandfather      CEREBROVASCULAR DISEASE Paternal Grandfather      Depression Son      Psychotic Disorder Son      anxiety disorder     Depression Daughter      CEREBROVASCULAR DISEASE Maternal Grandfather      CEREBROVASCULAR DISEASE Paternal Grandmother      DIABETES Brother      Allergies Sister      Depression Sister      Gynecology Sister      Coronary Artery Disease No family hx of      Hyperlipidemia No family hx of      Breast Cancer No family hx of      Colon Cancer No family hx of      Prostate Cancer No family hx of      Other Cancer No family hx of      Anxiety Disorder No family hx of      MENTAL ILLNESS No family hx of      Substance Abuse No family hx of      Anesthesia Reaction No family hx of      OSTEOPOROSIS No family hx of      Genetic  Disorder No family hx of      Thyroid Disease No family hx of      Obesity No family hx of      Unknown/Adopted No family hx of        Social History   Substance Use Topics     Smoking status: Never Smoker     Smokeless tobacco: Never Used     Alcohol use No       Current Facility-Administered Medications   Medication     nitroGLYcerin (NITROSTAT) sublingual tablet 0.4 mg     lisinopril (PRINIVIL/ZESTRIL) tablet 20 mg     Current Outpatient Prescriptions   Medication     lisinopril (PRINIVIL/ZESTRIL) 20 MG tablet     aspirin (ASPIRIN LOW DOSE) 81 MG tablet     DULoxetine HCl (CYMBALTA PO)     SUMAtriptan (IMITREX) 5 MG/ACT nasal spray     ketoconazole (NIZORAL) 2 % shampoo     atorvastatin (LIPITOR) 10 MG tablet     gabapentin (NEURONTIN) 800 MG tablet     metFORMIN (GLUCOPHAGE-XR) 500 MG 24 hr tablet     order for DME     divalproex (DEPAKOTE) 500 MG EC tablet     doxepin (SINEQUAN) 25 MG capsule     OLANZapine (ZYPREXA) 7.5 MG tablet     order for DME        Allergies   Allergen Reactions     Liraglutide Other (See Comments)     Sulfa Drugs Swelling     Pt has taken  Taken sulfa drugs orally without trouble. He had problems with Sulfa eye drops. Eye swelled up     Victoza      Increased migraine frequency and severity       I have reviewed the Medications, Allergies, Past Medical and Surgical History, and Social History in the Epic system.    Review of Systems   Respiratory: Negative for shortness of breath.    Cardiovascular: Positive for chest pain.   Gastrointestinal: Positive for abdominal pain and nausea. Negative for diarrhea and vomiting.   Genitourinary: Negative for decreased urine volume, difficulty urinating, dysuria and frequency.   All other systems reviewed and are negative.      Physical Exam   BP: (!) 184/114  Pulse: 102  Temp: 98.4  F (36.9  C)  Resp: 16  Weight: 106.6 kg (235 lb)  SpO2: 96 %      Physical Exam   Constitutional: No distress.   HENT:   Head: Atraumatic.   Mouth/Throat: Oropharynx  is clear and moist. No oropharyngeal exudate.   Eyes: Pupils are equal, round, and reactive to light. No scleral icterus.   Cardiovascular: Normal heart sounds and intact distal pulses.    Pulmonary/Chest: Breath sounds normal. No respiratory distress.   Abdominal: Soft. Bowel sounds are normal. There is no tenderness.   Musculoskeletal: He exhibits no edema or tenderness.   Skin: Skin is warm. No rash noted. He is not diaphoretic.   Nursing note and vitals reviewed.      ED Course     ED Course     Procedures         EKG: unchanged    Critical Care time:  none             Labs Ordered and Resulted from Time of ED Arrival Up to the Time of Departure from the ED   CBC WITH PLATELETS DIFFERENTIAL - Abnormal; Notable for the following:        Result Value    RBC Count 3.52 (*)     Hemoglobin 12.3 (*)     Hematocrit 34.1 (*)     MCH 34.9 (*)     Platelet Count 137 (*)     All other components within normal limits   COMPREHENSIVE METABOLIC PANEL - Abnormal; Notable for the following:     Sodium 131 (*)     Glucose 205 (*)     Protein Total 6.7 (*)     All other components within normal limits   TROPONIN I - Abnormal; Notable for the following:     Troponin I ES 0.056 (*)     All other components within normal limits   PERIPHERAL IV CATHETER   ISTAT TROPONIN NURSING POCT   TROPONIN POCT            Assessments & Plan (with Medical Decision Making)     62 year old male with history of hypertension, DM type II, AL amyloidosis status post BMT in December 2016, NORMA, bipolar disorder with adeel, chronic abdominal pain, and narcotic dependence who presents with persistent chest pain after discharge from OBS stay for hypertensive emergency. Presentation concerning for possibly recurrent hypertensive emergency, ACS, aortic dissection, PE, pneumonia. IV established and labs sent revealing down trending Trop from pervious day. Blood pressure controlled with IV labetalol 10 mg along with pain control dilaudid 0.5 mg IV x 2. CT C/A/P  revealed no evidence of aortic dissection or PE, and U/S of RUQ showed NAD. Patient's need for adherence with outpatient antihypertensive regimen reiterated to him, with plan for close follow up with PCP.     I have reviewed the nursing notes.    I have reviewed the findings, diagnosis, plan and need for follow up with the patient.    New Prescriptions    No medications on file       Final diagnoses:   Chest pain, unspecified type   I, Celso Abreu, am serving as a trained medical scribe to document services personally performed by Malena Means MD, based on the provider's statements to me.      I, Malena Means MD, was physically present and have reviewed and verified the accuracy of this note documented by Celso Abreu.       1/26/2018   [facility], [place], EMERGENCY DEPARTMENT     Malena Means MD  02/05/18 [id number]

## 2018-01-27 NOTE — ED NOTES
Bed: ED07  Expected date: 1/26/18  Expected time: 6:15 PM  Means of arrival:   Comments:  62 y.o male chest pain

## 2018-01-27 NOTE — ED NOTES
"Pt had difficulty breathing and a lot of chest pain during the night. Pt called 911 within the past hour. SL Nitro given x 2 without effect. Chest pain with breathing. C/O pain in chest , upper back and neck. No radiation of pain to arms. Pt feel a pain \"band\" across his chest.  "

## 2018-01-28 NOTE — DISCHARGE INSTRUCTIONS
Please make an appointment to follow up with Pain [facility] (phone: [phone]) as soon as possible.

## 2018-01-29 ENCOUNTER — APPOINTMENT (OUTPATIENT)
Dept: GENERAL RADIOLOGY | Facility: CLINIC | Age: 63
End: 2018-01-29
Attending: EMERGENCY MEDICINE
Payer: MEDICARE

## 2018-01-29 ENCOUNTER — HOSPITAL ENCOUNTER (EMERGENCY)
Facility: CLINIC | Age: 63
Discharge: HOME | End: 2018-01-29
Attending: EMERGENCY MEDICINE | Admitting: EMERGENCY MEDICINE
Payer: MEDICARE

## 2018-01-29 VITALS
HEART RATE: 95 BPM | WEIGHT: 235 LBS | RESPIRATION RATE: 18 BRPM | SYSTOLIC BLOOD PRESSURE: 142 MMHG | TEMPERATURE: 98 F | BODY MASS INDEX: 36.88 KG/M2 | DIASTOLIC BLOOD PRESSURE: 88 MMHG | OXYGEN SATURATION: 98 % | HEIGHT: 67 IN

## 2018-01-29 DIAGNOSIS — E86.0 DEHYDRATION: ICD-10-CM

## 2018-01-29 DIAGNOSIS — R11.0 NAUSEA: ICD-10-CM

## 2018-01-29 DIAGNOSIS — R07.89 ATYPICAL CHEST PAIN: ICD-10-CM

## 2018-01-29 DIAGNOSIS — R10.13 ABDOMINAL PAIN, EPIGASTRIC: ICD-10-CM

## 2018-01-29 LAB
ALBUMIN SERPL-MCNC: 2.9 G/DL (ref 3.4–5)
ALP SERPL-CCNC: 59 U/L (ref 40–150)
ALT SERPL W P-5'-P-CCNC: 22 U/L (ref 0–70)
ANION GAP SERPL CALCULATED.3IONS-SCNC: 13 MMOL/L (ref 3–14)
AST SERPL W P-5'-P-CCNC: 39 U/L (ref 0–45)
BASOPHILS # BLD AUTO: 0 10E9/L (ref 0–0.2)
BASOPHILS NFR BLD AUTO: 0.3 %
BILIRUB SERPL-MCNC: 0.9 MG/DL (ref 0.2–1.3)
BUN SERPL-MCNC: 20 MG/DL (ref 7–30)
CALCIUM SERPL-MCNC: 7 MG/DL (ref 8.5–10.1)
CHLORIDE SERPL-SCNC: 101 MMOL/L (ref 94–109)
CO2 SERPL-SCNC: 17 MMOL/L (ref 20–32)
CREAT SERPL-MCNC: 0.8 MG/DL (ref 0.66–1.25)
DIFFERENTIAL METHOD BLD: ABNORMAL
EOSINOPHIL # BLD AUTO: 0.1 10E9/L (ref 0–0.7)
EOSINOPHIL NFR BLD AUTO: 1.1 %
ERYTHROCYTE [DISTWIDTH] IN BLOOD BY AUTOMATED COUNT: 14.1 % (ref 10–15)
GFR SERPL CREATININE-BSD FRML MDRD: >90 ML/MIN/1.7M2
GLUCOSE SERPL-MCNC: 131 MG/DL (ref 70–99)
HCT VFR BLD AUTO: 31.9 % (ref 40–53)
HGB BLD-MCNC: 11.5 G/DL (ref 13.3–17.7)
IMM GRANULOCYTES # BLD: 0 10E9/L (ref 0–0.4)
IMM GRANULOCYTES NFR BLD: 0.4 %
INTERPRETATION ECG - MUSE: NORMAL
LIPASE SERPL-CCNC: 54 U/L (ref 73–393)
LYMPHOCYTES # BLD AUTO: 1.4 10E9/L (ref 0.8–5.3)
LYMPHOCYTES NFR BLD AUTO: 15.6 %
MCH RBC QN AUTO: 34.5 PG (ref 26.5–33)
MCHC RBC AUTO-ENTMCNC: 36.1 G/DL (ref 31.5–36.5)
MCV RBC AUTO: 96 FL (ref 78–100)
MONOCYTES # BLD AUTO: 0.8 10E9/L (ref 0–1.3)
MONOCYTES NFR BLD AUTO: 8.5 %
NEUTROPHILS # BLD AUTO: 6.8 10E9/L (ref 1.6–8.3)
NEUTROPHILS NFR BLD AUTO: 74.1 %
NRBC # BLD AUTO: 0 10*3/UL
NRBC BLD AUTO-RTO: 0 /100
NT-PROBNP SERPL-MCNC: 335 PG/ML (ref 0–900)
PLATELET # BLD AUTO: 90 10E9/L (ref 150–450)
POTASSIUM SERPL-SCNC: 3.9 MMOL/L (ref 3.4–5.3)
PROT SERPL-MCNC: 5.8 G/DL (ref 6.8–8.8)
RBC # BLD AUTO: 3.33 10E12/L (ref 4.4–5.9)
SODIUM SERPL-SCNC: 131 MMOL/L (ref 133–144)
TROPONIN I SERPL-MCNC: 0.02 UG/L (ref 0–0.04)
WBC # BLD AUTO: 9.1 10E9/L (ref 4–11)

## 2018-01-29 PROCEDURE — 25000132 ZZH RX MED GY IP 250 OP 250 PS 637: Mod: GY | Performed by: EMERGENCY MEDICINE

## 2018-01-29 PROCEDURE — 96361 HYDRATE IV INFUSION ADD-ON: CPT | Performed by: EMERGENCY MEDICINE

## 2018-01-29 PROCEDURE — 25000125 ZZHC RX 250: Performed by: EMERGENCY MEDICINE

## 2018-01-29 PROCEDURE — 93010 ELECTROCARDIOGRAM REPORT: CPT | Mod: Z6 | Performed by: EMERGENCY MEDICINE

## 2018-01-29 PROCEDURE — 99285 EMERGENCY DEPT VISIT HI MDM: CPT | Mod: 25 | Performed by: EMERGENCY MEDICINE

## 2018-01-29 PROCEDURE — 25000128 H RX IP 250 OP 636: Performed by: EMERGENCY MEDICINE

## 2018-01-29 PROCEDURE — 93005 ELECTROCARDIOGRAM TRACING: CPT | Performed by: EMERGENCY MEDICINE

## 2018-01-29 PROCEDURE — 96374 THER/PROPH/DIAG INJ IV PUSH: CPT | Performed by: EMERGENCY MEDICINE

## 2018-01-29 PROCEDURE — 80053 COMPREHEN METABOLIC PANEL: CPT | Performed by: EMERGENCY MEDICINE

## 2018-01-29 PROCEDURE — 85025 COMPLETE CBC W/AUTO DIFF WBC: CPT | Performed by: EMERGENCY MEDICINE

## 2018-01-29 PROCEDURE — 71046 X-RAY EXAM CHEST 2 VIEWS: CPT

## 2018-01-29 PROCEDURE — 83690 ASSAY OF LIPASE: CPT | Performed by: EMERGENCY MEDICINE

## 2018-01-29 PROCEDURE — 84484 ASSAY OF TROPONIN QUANT: CPT | Performed by: EMERGENCY MEDICINE

## 2018-01-29 PROCEDURE — A9270 NON-COVERED ITEM OR SERVICE: HCPCS | Mod: GY | Performed by: EMERGENCY MEDICINE

## 2018-01-29 PROCEDURE — 99284 EMERGENCY DEPT VISIT MOD MDM: CPT | Mod: 25 | Performed by: EMERGENCY MEDICINE

## 2018-01-29 PROCEDURE — 83880 ASSAY OF NATRIURETIC PEPTIDE: CPT | Performed by: EMERGENCY MEDICINE

## 2018-01-29 RX ORDER — METOCLOPRAMIDE HYDROCHLORIDE 5 MG/ML
10 INJECTION INTRAMUSCULAR; INTRAVENOUS ONCE
Status: DISCONTINUED | OUTPATIENT
Start: 2018-01-29 | End: 2018-01-29 | Stop reason: HOSPADM

## 2018-01-29 RX ORDER — KETOROLAC TROMETHAMINE 30 MG/ML
30 INJECTION, SOLUTION INTRAMUSCULAR; INTRAVENOUS ONCE
Status: COMPLETED | OUTPATIENT
Start: 2018-01-29 | End: 2018-01-29

## 2018-01-29 RX ORDER — OLANZAPINE 5 MG/1
10 TABLET, ORALLY DISINTEGRATING ORAL ONCE
Status: COMPLETED | OUTPATIENT
Start: 2018-01-29 | End: 2018-01-29

## 2018-01-29 RX ORDER — ONDANSETRON 8 MG/1
8 TABLET, ORALLY DISINTEGRATING ORAL EVERY 8 HOURS PRN
Qty: 10 TABLET | Refills: 0 | Status: SHIPPED | OUTPATIENT
Start: 2018-01-29 | End: 2018-02-01

## 2018-01-29 RX ORDER — LIDOCAINE 50 MG/G
PATCH TOPICAL
Qty: 30 PATCH | Refills: 0 | Status: SHIPPED | OUTPATIENT
Start: 2018-01-29 | End: 2019-01-09

## 2018-01-29 RX ADMIN — LIDOCAINE HYDROCHLORIDE 30 ML: 20 SOLUTION ORAL; TOPICAL at 03:39

## 2018-01-29 RX ADMIN — OLANZAPINE 10 MG: 5 TABLET, ORALLY DISINTEGRATING ORAL at 02:50

## 2018-01-29 RX ADMIN — SODIUM CHLORIDE 1000 ML: 9 INJECTION, SOLUTION INTRAVENOUS at 05:23

## 2018-01-29 RX ADMIN — KETOROLAC TROMETHAMINE 30 MG: 30 INJECTION, SOLUTION INTRAMUSCULAR at 02:52

## 2018-01-29 ASSESSMENT — ENCOUNTER SYMPTOMS
FEVER: 0
VOMITING: 0
NAUSEA: 1
DIARRHEA: 0
COUGH: 0
ABDOMINAL PAIN: 1
SHORTNESS OF BREATH: 0

## 2018-01-29 NOTE — ED AVS SNAPSHOT
[facility], [place], Emergency Department    [address] [place] [id number]    Phone:  [phone]                                       Parmjit Hernández   MRN: [id number]    Department:  [facility], Emergency Department   Date of Visit:  1/29/2018           After Visit Summary Signature Page     I have received my discharge instructions, and my questions have been answered. I have discussed any challenges I see with this plan with the nurse or doctor.    ..........................................................................................................................................  Patient/Patient Representative Signature      ..........................................................................................................................................  Patient Representative Print Name and Relationship to Patient    ..................................................               ................................................  Date                                            Time    ..........................................................................................................................................  Reviewed by Signature/Title    ...................................................              ..............................................  Date                                                            Time

## 2018-01-29 NOTE — ED PROVIDER NOTES
History     Chief Complaint   Patient presents with     Chest Pain     Abdominal Pain     HPI  Parmjit Hernández is a 62 year old male with a long history of chronic abdominal pain, as well as a history of amyloidosis, status post treatment, who presents with ongoing epigastric pain for the last 24 hours.  He states that pain is constant, nothing makes it better.  He states deep breathing causes increased pain.  At times he feels the pain radiates up the center of his chest and into his neck.  No shortness of breath, cough, fever.  He has had nausea but no vomiting.  No diarrhea.  No dysuria.  He states he is compliant with his blood pressure medication.  He was just admitted to the hospital a few days ago with hypertensive emergency, as well as mildly elevated troponin.  Get a cardiac MRI which was not remarkable.  He states he has not made a follow-up appointment yet but plans to do so.    Past Medical History:   Diagnosis Date     Allergic state      Amyloidosis (H)      Diabetes mellitus (H)     type 2     Headache(784.0)      Hypertension 2007     Myalgia and myositis, unspecified      Obsessive-compulsive disorders      Other acquired absence of organ 94     Other specified viral warts      Pain in the abdomen      Pure hypercholesterolemia        Past Surgical History:   Procedure Laterality Date     BONE MARROW BIOPSY, BONE SPECIMEN, NEEDLE/TROCAR N/A 6/8/2016    Procedure: BIOPSY BONE MARROW;  Surgeon: Nathan Agrawal MD;  Location:  GI     C NONSPECIFIC PROCEDURE  94    Cholecystectomy     C NONSPECIFIC PROCEDURE  2000    repair deviated septum     CHOLECYSTECTOMY  1995    lap qian     COLONOSCOPY  2012    hx polyps     ESOPHAGOSCOPY, GASTROSCOPY, DUODENOSCOPY (EGD), COMBINED N/A 5/29/2015    Procedure: COMBINED ESOPHAGOSCOPY, GASTROSCOPY, DUODENOSCOPY (EGD), BIOPSY SINGLE OR MULTIPLE;  Surgeon: John Jacob MD;  Location:  GI     HERNIA REPAIR, UMBILICAL  2006       Family History    Problem Relation Age of Onset     CEREBROVASCULAR DISEASE Mother      C.A.D. Mother      Hypertension Mother      Alcohol/Drug Mother      Arthritis Mother      CEREBROVASCULAR DISEASE Maternal Grandmother      C.A.D. Maternal Grandmother      Alcohol/Drug Maternal Grandmother      C.A.D. Father      HEART DISEASE Father      Hypertension Father      Alcohol/Drug Father      Allergies Father      Circulatory Father      Depression Father      Respiratory Father      Asthma Father      Alcohol/Drug Paternal Grandfather      CEREBROVASCULAR DISEASE Paternal Grandfather      Depression Son      Psychotic Disorder Son      anxiety disorder     Depression Daughter      CEREBROVASCULAR DISEASE Maternal Grandfather      CEREBROVASCULAR DISEASE Paternal Grandmother      DIABETES Brother      Allergies Sister      Depression Sister      Gynecology Sister      Coronary Artery Disease No family hx of      Hyperlipidemia No family hx of      Breast Cancer No family hx of      Colon Cancer No family hx of      Prostate Cancer No family hx of      Other Cancer No family hx of      Anxiety Disorder No family hx of      MENTAL ILLNESS No family hx of      Substance Abuse No family hx of      Anesthesia Reaction No family hx of      OSTEOPOROSIS No family hx of      Genetic Disorder No family hx of      Thyroid Disease No family hx of      Obesity No family hx of      Unknown/Adopted No family hx of        Social History   Substance Use Topics     Smoking status: Never Smoker     Smokeless tobacco: Never Used     Alcohol use No           I have reviewed the Medications, Allergies, Past Medical and Surgical History, and Social History in the Epic system.    Review of Systems   Constitutional: Negative for fever.   Respiratory: Negative for cough and shortness of breath.    Cardiovascular: Positive for chest pain.   Gastrointestinal: Positive for abdominal pain and nausea. Negative for diarrhea and vomiting.   All other systems  "reviewed and are negative.      Physical Exam   BP: (!) 122/94  Pulse: 95  Heart Rate: 94  Temp: 97.6  F (36.4  C)  Resp: 13  Height: 170.2 cm (5' 7\")  Weight: 106.6 kg (235 lb)  SpO2: 92 %      Physical Exam   Constitutional: No distress.   HENT:   Head: Atraumatic.   Mouth/Throat: Oropharynx is clear and moist. No oropharyngeal exudate.   Eyes: Pupils are equal, round, and reactive to light. No scleral icterus.   Cardiovascular: Normal heart sounds and intact distal pulses.    Pulmonary/Chest: Breath sounds normal. No respiratory distress.   Abdominal: Soft. There is tenderness.       Decreased bowel sounds   Musculoskeletal: He exhibits no edema or tenderness.   Skin: Skin is warm. No rash noted. He is not diaphoretic.       ED Course     ED Course     Procedures             EKG Interpretation:      Interpreted by Marva Joseph  Time reviewed: 0214  Symptoms at time of EKG: epigastric abdominal pain   Rhythm: normal sinus   Rate: Normal  Axis: Left Axis Deviation  Ectopy: none  Conduction: right ventricular conducdtion delay  ST Segments/ T Waves: Non-specific STchanges  Q Waves: inferior leads  Comparison to prior: stable when compared with previous     Clinical Impression: stable EKG                Critical Care time:  none             Labs Ordered and Resulted from Time of ED Arrival Up to the Time of Departure from the ED   CBC WITH PLATELETS DIFFERENTIAL - Abnormal; Notable for the following:        Result Value    RBC Count 3.33 (*)     Hemoglobin 11.5 (*)     Hematocrit 31.9 (*)     MCH 34.5 (*)     Platelet Count 90 (*)     All other components within normal limits   COMPREHENSIVE METABOLIC PANEL - Abnormal; Notable for the following:     Sodium 131 (*)     Carbon Dioxide 17 (*)     Glucose 131 (*)     Calcium 7.0 (*)     Albumin 2.9 (*)     Protein Total 5.8 (*)     All other components within normal limits   LIPASE - Abnormal; Notable for the following:     Lipase 54 (*)     All other components " "within normal limits   NT PROBNP INPATIENT   TROPONIN I            Assessments & Plan (with Medical Decision Making)   The patient was admitted several days ago with a mildly elevated troponin, presumed secondary to hypertensive emergency, given extremely high blood pressures in the emergency department.  Cardiac MRI/stress test was not remarkable/concerning.  He had a chest/abdomen CT to evaluate for aortic dissection at that time, which was negative.  He is here today with similar symptoms.  Today symptoms have been ongoing for a full day.  Troponin remains negative at this time, thus essentially ruling out acute coronary syndrome.  Given the similar symptoms with negative chest CT from a few days ago, dissection very unlikely, as his PE.  No evidence for pneumonia or pulmonary edema on chest x-ray.  Basic labs are not remarkable, with the exception of a mildly low bicarb.  Patient does look a little dry, was given a liter of IV fluid.  He was given Zyprexa here for nausea and pain, as well as GI cocktail, and Toradol.  He states his pain is no better, though does look more comfortable, and did sleep for a while.  Nausea has improved.    The cause of his pain/symptoms is unclear.  However, again, I did review his records, and see quite a number of visits with similar upper abdominal pain over the last couple of years.  Although he states at times the pain radiates up into his chest, seems at the primary source of his pain is in the epigastric area.  During my last visit with him, he repeated numerous times that the only thing that would help his pain with Dilaudid.  We had a very extensive conversation that I would not be treating chronic pain in the ER with opiates.  This morning, again, he repeatedly states that he is come here for, \"relief.\"  Unfortunately, I do not have any other specific ideas of things I will help him feel better, outside of opiates.  I have explained this to him.  I will give him a " prescription for Lidoderm patches, to see if that helps.  I expressed to him that he needs to follow-up with his outpatient providers.  He is instructed to return to the ER if new or worsening symptoms.  He verbalizes understanding, is agreeable to the plan.    Dictation Disclaimer: Some of this Note has been completed with voice-recognition dictation software. Although errors are generally corrected real-time, there is the potential for a rare error to be present in the completed chart.      I have reviewed the nursing notes.    I have reviewed the findings, diagnosis, plan and need for follow up with the patient.    Discharge Medication List as of 1/29/2018  6:53 AM      START taking these medications    Details   lidocaine (LIDODERM) 5 % Patch Apply up to 3 patches to painful area at once for up to 12 h within a 24 h period.  Remove after 12 hours.Disp-30 patch, R-0Local Print      ondansetron (ZOFRAN ODT) 8 MG ODT tab Take 1 tablet (8 mg) by mouth every 8 hours as needed for nausea, Disp-10 tablet, R-0, Local Print             Final diagnoses:   Abdominal pain, epigastric   Atypical chest pain   Nausea   Dehydration       1/29/2018   [facility], [place], EMERGENCY DEPARTMENT     Marva Joseph MD  01/30/18 0683

## 2018-01-29 NOTE — ED NOTES
Pt brought in by ambulance with c/o chest pain since last week that is described as sharp and throbbing radiating into the neck. Pain is in lower chest/upper abdomen. Medics report he has been seen twice for similar symptoms and found to have an elevated BP and once an elevated Trop. Pt reports painful deep breaths and cough. Pt states time is making it worse and requesting pain medication.

## 2018-01-29 NOTE — ED AVS SNAPSHOT
[facility], Emergency Department    [address] [address]    Phone:  [phone]                                       Parmjit Hernández   MRN: [id number]    Department:  [facility], Emergency Department   Date of Visit:  1/29/2018           Patient Information     Date Of Birth          1955        Your diagnoses for this visit were:     Abdominal pain, epigastric     Atypical chest pain     Nausea     Dehydration        You were seen by Marva Joseph MD.        Discharge Instructions       Please make an appointment to follow up with Your Primary Care Provider as soon as possible.      Discharge References/Attachments     ABDOMINAL PAIN, UNKOWN CAUSE, (MALE) (ENGLISH)    DEHYDRATION (ADULT) (ENGLISH)      24 Hour Appointment Hotline       To make an appointment at any [facility] clinic, call [phone] ([phone]). If you don't have a family doctor or clinic, we will help you find one. [facility] clinics are conveniently located to serve the needs of you and your family.             Review of your medicines      START taking        Dose / Directions Last dose taken    lidocaine 5 % Patch   Commonly known as:  LIDODERM   Quantity:  30 patch        Apply up to 3 patches to painful area at once for up to 12 h within a 24 h period.  Remove after 12 hours.   Refills:  0        ondansetron 8 MG ODT tab   Commonly known as:  ZOFRAN ODT   Dose:  8 mg   Quantity:  10 tablet        Take 1 tablet (8 mg) by mouth every 8 hours as needed for nausea   Refills:  0          Our records show that you are taking the medicines listed below. If these are incorrect, please call your family doctor or clinic.        Dose / Directions Last dose taken    aspirin 81 MG tablet   Commonly known as:  ASPIRIN LOW DOSE   Dose:  81 mg   Quantity:  30 tablet        Take 1 tablet (81 mg) by mouth daily   Refills:  3        atorvastatin 10 MG tablet   Commonly known as:  LIPITOR   Quantity:  210 tablet         TAKE 1 TABLET BY MOUTH EVERY DAY   Refills:  0        CYMBALTA PO   Dose:  20 mg        Take 20 mg by mouth daily   Refills:  0        divalproex 500 MG EC tablet   Commonly known as:  DEPAKOTE   Dose:  1000 mg   Quantity:  10 tablet        Take 2 tablets (1,000 mg) by mouth daily   Refills:  0        doxepin 25 MG capsule   Commonly known as:  SINEquan   Dose:  50 mg   Quantity:  10 capsule        Take 2 capsules (50 mg) by mouth At Bedtime   Refills:  0        gabapentin 800 MG tablet   Commonly known as:  NEURONTIN   Dose:  800 mg   Quantity:  90 tablet        Take 1 tablet (800 mg) by mouth 3 times daily   Refills:  11        ketoconazole 2 % shampoo   Commonly known as:  NIZORAL   Quantity:  120 mL        APPLY TO THE AFFECTED AREA AND WASH OFF AFTER 5 MINUTES   Refills:  2        lisinopril 20 MG tablet   Commonly known as:  PRINIVIL/ZESTRIL   Dose:  20 mg   Quantity:  90 tablet        Take 1 tablet (20 mg) by mouth daily   Refills:  3        metFORMIN 500 MG 24 hr tablet   Commonly known as:  GLUCOPHAGE-XR   Dose:  1000 mg   Quantity:  180 tablet        Take 2 tablets (1,000 mg) by mouth daily (with dinner)   Refills:  1        OLANZapine 7.5 MG tablet   Commonly known as:  zyPREXA   Dose:  7.5 mg   Quantity:  5 tablet        Take 1 tablet (7.5 mg) by mouth At Bedtime   Refills:  0        * order for DME   Quantity:  1 Device        Equipment being ordered: CPAP   Refills:  0        * order for DME   Quantity:  2 Box        Equipment being ordered: sharla hose   Refills:  1        SUMAtriptan 5 MG/ACT nasal spray   Commonly known as:  IMITREX   Quantity:  1 each        SPRAY 1 TO 2 SPRAYS IN NOSTRIL AS NEEDED FOR MIGRAINE. MAY REPEAT IN 2 HOURS. MAX 8 SPRAYS IN 24 HOURS   Refills:  0        * Notice:  This list has 2 medication(s) that are the same as other medications prescribed for you. Read the directions carefully, and ask your doctor or other care provider to review them with you.            Prescriptions  were sent or printed at these locations (2 Prescriptions)                   Other Prescriptions                Printed at Department/Unit printer (2 of 2)         lidocaine (LIDODERM) 5 % Patch               ondansetron (ZOFRAN ODT) 8 MG ODT tab                Procedures and tests performed during your visit     CBC with platelets differential    Chest XR,  PA & LAT    Comprehensive metabolic panel    EKG 12 lead    Lipase    Nt probnp inpatient    Troponin I      Orders Needing Specimen Collection     None      Pending Results     Date and Time Order Name Status Description    1/29/2018 0213 EKG 12 lead Preliminary             Pending Culture Results     No orders found from 1/27/2018 to 1/30/2018.            Pending Results Instructions     If you had any lab results that were not finalized at the time of your Discharge, you can call the ED Lab Result RN at [phone]. You will be contacted by this team for any positive Lab results or changes in treatment. The nurses are available 7 days a week from 10A to 6:30P.  You can leave a message 24 hours per day and they will return your call.        Thank you for choosing [facility]       Thank you for choosing [facility] for your care. Our goal is always to provide you with excellent care. Hearing back from our patients is one way we can continue to improve our services. Please take a few minutes to complete the written survey that you may receive in the mail after you visit with us. Thank you!        [facility] Information     [facility] gives you secure access to your electronic health record. If you see a primary care provider, you can also send messages to your care team and make appointments. If you have questions, please call your primary care clinic.  If you do not have a primary care provider, please call [phone] and they will assist you.        Care EveryWhere ID     This is your Care EveryWhere ID. This could be used by other organizations to access your [facility]  medical records  [id number]        Equal Access to Services     JUSTYNA FRIEDMAN : Lyle Blevins, steve pierce, glenis miranda. So [facility] [phone].    ATENCIÓN: Si habla español, tiene a agudelo disposición servicios gratuitos de asistencia lingüística. Llame al [phone].    We comply with applicable federal civil rights laws and [place] laws. We do not discriminate on the basis of race, color, national origin, age, disability, sex, sexual orientation, or gender identity.            After Visit Summary       This is your record. Keep this with you and show to your community pharmacist(s) and doctor(s) at your next visit.

## 2018-02-01 ENCOUNTER — CARE COORDINATION (OUTPATIENT)
Dept: CARE COORDINATION | Facility: CLINIC | Age: 63
End: 2018-02-01

## 2018-02-01 NOTE — PROGRESS NOTES
Clinic Care Coordination Contact  [facility]/Voicemail    Referral Source: ED Follow-Up  Clinical Data: Care Coordinator Outreach  Outreach attempted x 1.  Left message on voicemail with call back information and requested return call.  Plan: Care Coordinator will mail out care coordination introduction letter with care coordinator contact information and explanation of care coordination services. Care Coordinator will try to reach patient again in 1-2 business days.

## 2018-02-01 NOTE — LETTER
[facility]  [address] [facility] [address]  Phone: [phone]      February 1, 2018      Parmjit Hernández  [address]   [facility] [address]    Dear Parmjit,    We have been trying to reach you to introduce you to [facility] Care Coordination program.  The goal of care coordination is to help you manage your health and improve access to the [facility] system in the most efficient manner.  The Care Coordinator is a nurse who understands the healthcare system and will assist you in improving your access to care.     As your Physician and Care Coordinator we partner to help you achieve your health care goals.     We will continue to reach out; however, if you are able to call your Care Coordinator at [phone], that would be appreciated.  We at [facility] are focused on providing you with the highest-quality healthcare experience possible.      It is a pleasure to partner with you as we work towards achieving your optimal state of wellness.        Sincerely,        Belkys Carr, RN  Clinic Care Coordinator   [facility] [place], [place] and [facility] [place] [facility]   Phone: [phone]

## 2018-02-06 NOTE — ED PROVIDER NOTES
History     Chief Complaint   Patient presents with     Chest Pain     Chest pain unrelieved with subling' nitro X 2     HPI  Parmjit Hernández is a 62 year old male with a long history of chronic abdominal pain, as well as a history of amyloidosis, status post treatment, and with two recent encounters in as many days for evaluation of chest pain and epigastric pain in setting of hypertensive emergency, with evaluations including two CT C/A/P scans, Cardiac MRI, serial TN's and EKGs and treatments including an OBS stay and re-dosing of IV anti-hypertensives. Patient presents again this visit with similar symptoms unchanged from 2 prior.      I have reviewed the Medications, Allergies, Past Medical and Surgical History, and Social History in the Epic system.    Review of Systems    Physical Exam   BP: (!) 153/108 (Simultaneous filing. User may not have seen previous data.)  Pulse: 99  Heart Rate: 99  Temp: 97.7  F (36.5  C)  Resp: 16  SpO2: 95 %      Physical Exam   Constitutional: No distress.   HENT:   Head: Atraumatic.   Mouth/Throat: Oropharynx is clear and moist. No oropharyngeal exudate.   Eyes: Pupils are equal, round, and reactive to light. No scleral icterus.   Cardiovascular: Normal heart sounds and intact distal pulses.    Pulmonary/Chest: Breath sounds normal. No respiratory distress.   Abdominal: Soft. Bowel sounds are normal. There is no tenderness.   Musculoskeletal: He exhibits no edema or tenderness.   Skin: Skin is warm. No rash noted. He is not diaphoretic.   Nursing note and vitals reviewed.      ED Course     ED Course     Procedures        EKG: Unchanged    Critical Care time:  none             Labs Ordered and Resulted from Time of ED Arrival Up to the Time of Departure from the ED   CBC WITH PLATELETS DIFFERENTIAL - Abnormal; Notable for the following:        Result Value    RBC Count 3.62 (*)     Hemoglobin 12.6 (*)     Hematocrit 34.6 (*)     MCH 34.8 (*)     Platelet Count 132 (*)     All  other components within normal limits   COMPREHENSIVE METABOLIC PANEL - Abnormal; Notable for the following:     Sodium 126 (*)     Chloride 91 (*)     Glucose 186 (*)     All other components within normal limits   TROPONIN I   INR   PARTIAL THROMBOPLASTIN TIME   NT PROBNP INPATIENT   PERIPHERAL IV CATHETER            Assessments & Plan (with Medical Decision Making)     62 year old male with a long history of chronic abdominal pain, as well as a history of amyloidosis, status post treatment, and with two recent encounters in as many days for evaluation of chest pain and epigastric pain in setting of hypertensive emergency. Given the completeness of his evaluations over previous 48 hour period, including two CT C/A/P scans, Cardiac MRI, serial TN's and EKGs, patient's presentation is unlikely due to any acute process apart from uncontrolled hypertension as a contributing factor. Patient given toradol and maalox and guidance on Pain Clinic follow up in addition to plan for close PCP follow up.     I have reviewed the nursing notes.    I have reviewed the findings, diagnosis, plan and need for follow up with the patient.    Discharge Medication List as of 1/27/2018  7:22 PM          Final diagnoses:   Chest pain, unspecified type       1/27/2018   [facility], [place], EMERGENCY DEPARTMENT     Malena Means MD  02/05/18 3726

## 2018-02-15 ENCOUNTER — TRANSFERRED RECORDS (OUTPATIENT)
Dept: HEALTH INFORMATION MANAGEMENT | Facility: CLINIC | Age: 63
End: 2018-02-15

## 2018-03-03 ENCOUNTER — TRANSFERRED RECORDS (OUTPATIENT)
Dept: HEALTH INFORMATION MANAGEMENT | Facility: CLINIC | Age: 63
End: 2018-03-03

## 2018-03-12 DIAGNOSIS — E11.9 TYPE 2 DIABETES MELLITUS WITHOUT COMPLICATION, WITHOUT LONG-TERM CURRENT USE OF INSULIN (H): ICD-10-CM

## 2018-03-12 RX ORDER — METFORMIN HCL 500 MG
TABLET, EXTENDED RELEASE 24 HR ORAL
Qty: 60 TABLET | Refills: 0 | Status: SHIPPED | OUTPATIENT
Start: 2018-03-12 | End: 2018-05-14

## 2018-03-12 NOTE — TELEPHONE ENCOUNTER
Medication is being filled for 1 time refill only due to:  Patient needs to be seen because due for 6 month diabetes f/u.   Last seen 9/13/17.  Wandy Pena RN

## 2018-03-12 NOTE — TELEPHONE ENCOUNTER
"Requested Prescriptions   Pending Prescriptions Disp Refills     metFORMIN (GLUCOPHAGE-XR) 500 MG 24 hr tablet [Pharmacy Med Name: METFORMIN ER 500MG 24HR TABS] 180 tablet 0     Sig: TAKE 2 TABLETS(1000 MG) BY MOUTH DAILY WITH DINNER    Biguanide Agents Failed    3/12/2018  3:08 AM       Failed - Blood pressure less than 140/90 in past 6 months    BP Readings from Last 3 Encounters:   01/29/18 142/88   01/27/18 (!) 147/106   01/27/18 142/90                Failed - Patient has had a Microalbumin in the past 12 mos.    Recent Labs   Lab Test 06/23/16  09/01/15   1145   MICROALB  15.7   --    MICROL   --   29   UMALCR  21  11.00            Passed - Patient has documented LDL within the past 12 mos.    Recent Labs   Lab Test  05/25/17   0904   LDL  124*            Passed - Patient is age 10 or older       Passed - Patient has documented A1c within the specified period of time.    Recent Labs   Lab Test  01/18/18   0900   A1C  7.7*            Passed - Patient's CR is NOT>1.4 OR Patient's EGFR is NOT<45 within past 12 mos.    Recent Labs   Lab Test  01/29/18   0216   GFRESTIMATED  >90   GFRESTBLACK  >90       Recent Labs   Lab Test  01/29/18   0216   CR  0.80            Passed - Patient does NOT have a diagnosis of CHF.       Passed - Recent (6 mo) or future (30 days) visit within the authorizing provider's specialty    Patient had office visit in the last 6 months or has a visit in the next 30 days with authorizing provider or within the authorizing provider's specialty.  See \"Patient Info\" tab in inbasket, or \"Choose Columns\" in Meds & Orders section of the refill encounter.              "

## 2018-03-18 DIAGNOSIS — I10 HYPERTENSION GOAL BP (BLOOD PRESSURE) < 140/90: ICD-10-CM

## 2018-03-19 RX ORDER — LISINOPRIL 10 MG/1
TABLET ORAL
Start: 2018-03-19

## 2018-03-19 NOTE — TELEPHONE ENCOUNTER
Too soon.  Dr. aKtz sent Rx 1/25/18 to [facility] for #90 with 3 refills.  Wandy Pena RN

## 2018-03-19 NOTE — TELEPHONE ENCOUNTER
"Requested Prescriptions   Pending Prescriptions Disp Refills     lisinopril (PRINIVIL/ZESTRIL) 10 MG tablet [Pharmacy Med Name: LISINOPRIL 10MG TABLETS] 90 tablet 0     Sig: TAKE 1 TABLET BY MOUTH DAILY    ACE Inhibitors (Including Combos) Protocol Failed    3/18/2018 10:35 AM       Failed - Blood pressure under 140/90 in past 12 months    BP Readings from Last 3 Encounters:   01/29/18 142/88   01/27/18 (!) 147/106   01/27/18 142/90                Passed - Recent (12 mo) or future (30 days) visit within the authorizing provider's specialty    Patient had office visit in the last 12 months or has a visit in the next 30 days with authorizing provider or within the authorizing provider's specialty.  See \"Patient Info\" tab in inbasket, or \"Choose Columns\" in Meds & Orders section of the refill encounter.           Passed - Patient is age 18 or older       Passed - Normal serum creatinine on file in past 12 months    Recent Labs   Lab Test  01/29/18   0216   CR  0.80            Passed - Normal serum potassium on file in past 12 months    Recent Labs   Lab Test  01/29/18   0216   POTASSIUM  3.9             "

## 2018-04-01 ENCOUNTER — TRANSFERRED RECORDS (OUTPATIENT)
Dept: HEALTH INFORMATION MANAGEMENT | Facility: CLINIC | Age: 63
End: 2018-04-01

## 2018-04-01 LAB — EJECTION FRACTION: 63

## 2018-04-02 DIAGNOSIS — G43.D1 ABDOMINAL MIGRAINE, INTRACTABLE: ICD-10-CM

## 2018-04-02 RX ORDER — SUMATRIPTAN 5 MG/1
SPRAY NASAL
Qty: 1 EACH | Refills: 0 | Status: SHIPPED | OUTPATIENT
Start: 2018-04-02 | End: 2018-05-14

## 2018-04-02 NOTE — TELEPHONE ENCOUNTER
"Prescription approved per [facility] Refill Protocol.  Stacie MAYA RN    Requested Prescriptions   Pending Prescriptions Disp Refills     SUMAtriptan (IMITREX) 5 MG/ACT nasal spray [Pharmacy Med Name: SUMATRIPTAN 5MG NASAL SPRAY (6 SPR)]  0     Sig: USE ONE TO TWO SPRAYS IN NOSTRIL AS NEEDED FOR MIGRAINE. MAY REPEAT IN 2 HOURS. MAX 8 SPRAYS IN 24 HOURS    Serotonin Agonists Failed    4/2/2018  2:18 PM       Failed - Blood pressure under 140/90 in past 12 months    BP Readings from Last 3 Encounters:   01/29/18 142/88   01/27/18 (!) 147/106   01/27/18 142/90                Failed - Serotonin Agonist request needs review.    Please review patient's record. If patient has had 8 or more treatments in the past month, please forward to provider.         Passed - Recent (12 mo) or future (30 days) visit within the authorizing provider's specialty    Patient had office visit in the last 12 months or has a visit in the next 30 days with authorizing provider or within the authorizing provider's specialty.  See \"Patient Info\" tab in inbasket, or \"Choose Columns\" in Meds & Orders section of the refill encounter.           Passed - Patient is age 18 or older            "

## 2018-04-06 ENCOUNTER — TRANSFERRED RECORDS (OUTPATIENT)
Dept: HEALTH INFORMATION MANAGEMENT | Facility: CLINIC | Age: 63
End: 2018-04-06

## 2018-04-23 ENCOUNTER — OFFICE VISIT (OUTPATIENT)
Dept: FAMILY MEDICINE | Facility: CLINIC | Age: 63
End: 2018-04-23
Payer: MEDICARE

## 2018-04-23 VITALS
BODY MASS INDEX: 36.26 KG/M2 | HEART RATE: 108 BPM | RESPIRATION RATE: 18 BRPM | HEIGHT: 67 IN | DIASTOLIC BLOOD PRESSURE: 88 MMHG | SYSTOLIC BLOOD PRESSURE: 128 MMHG | WEIGHT: 231 LBS | TEMPERATURE: 95.5 F | OXYGEN SATURATION: 91 %

## 2018-04-23 DIAGNOSIS — K29.50 OTHER CHRONIC GASTRITIS WITHOUT HEMORRHAGE: Primary | ICD-10-CM

## 2018-04-23 DIAGNOSIS — F11.20 NARCOTIC DEPENDENCE (H): ICD-10-CM

## 2018-04-23 DIAGNOSIS — F31.9 BIPOLAR I DISORDER (H): ICD-10-CM

## 2018-04-23 PROCEDURE — 99214 OFFICE O/P EST MOD 30 MIN: CPT | Performed by: FAMILY MEDICINE

## 2018-04-23 RX ORDER — PANTOPRAZOLE SODIUM 40 MG/1
40 TABLET, DELAYED RELEASE ORAL DAILY
Qty: 30 TABLET | Refills: 1 | Status: SHIPPED | OUTPATIENT
Start: 2018-04-23 | End: 2018-12-26

## 2018-04-23 RX ORDER — ACETAMINOPHEN 325 MG/1
325 TABLET ORAL DAILY PRN
Qty: 10 TABLET | Refills: 0 | Status: SHIPPED | OUTPATIENT
Start: 2018-04-23 | End: 2019-02-14

## 2018-04-23 RX ORDER — ACETAMINOPHEN 325 MG/1
325 TABLET ORAL DAILY PRN
Qty: 10 TABLET | Refills: 0 | Status: SHIPPED | OUTPATIENT
Start: 2018-04-23 | End: 2018-04-23

## 2018-04-23 NOTE — PATIENT INSTRUCTIONS
1. Other chronic gastritis without hemorrhage  Add - ranitidine (ZANTAC) 150 MG tablet;   Take 1 tablet (150 mg) by mouth 2 times daily    Dispense: 60 tablet; Refill: 1    Continue & refilled- pantoprazole (PROTONIX) 40 MG EC tablet;   Take 1 tablet (40 mg) by mouth daily Take 30-60 minutes before a meal.  Dispense: 30 tablet; Refill: 1  See [facility] for unresolved pain   - GASTROENTEROLOGY ADULT REF CONSULT ONLY    Narcotics are not intended for long term  Limited prescription is provided as requested  No refills at [facility] provider  See Gastroenterology for further unresolved Gastroenterology /abd pain   - acetaminophen (TYLENOL) 325 MG tablet; Take 1 tablet (325 mg) by mouth daily as needed for mild pain  Dispense: 10 tablet; Refill: 0    2. Narcotic dependence (H)    Set an appointment with [facility]      3. Bipolar I disorder (H)   under care of psychiatrist

## 2018-04-23 NOTE — MR AVS SNAPSHOT
After Visit Summary   4/23/2018    Parmjit Hernández    MRN: [id number]           Patient Information     Date Of Birth          1955        Visit Information        Provider Department      4/23/2018 3:00 PM Maribeth Ardon MD [facility]        Today's Diagnoses     Other chronic gastritis without hemorrhage    -  1    Narcotic dependence (H)        Bipolar I disorder (H)          Care Instructions    1. Other chronic gastritis without hemorrhage  Add - ranitidine (ZANTAC) 150 MG tablet;   Take 1 tablet (150 mg) by mouth 2 times daily    Dispense: 60 tablet; Refill: 1    Continue & refilled- pantoprazole (PROTONIX) 40 MG EC tablet;   Take 1 tablet (40 mg) by mouth daily Take 30-60 minutes before a meal.  Dispense: 30 tablet; Refill: 1  See [facility] for unresolved pain   - GASTROENTEROLOGY ADULT REF CONSULT ONLY    Narcotics are not intended for long term  Limited prescription is provided as requested  No refills at [facility] provider  See Gastroenterology for further unresolved Gastroenterology /abd pain   - acetaminophen (TYLENOL) 325 MG tablet; Take 1 tablet (325 mg) by mouth daily as needed for mild pain  Dispense: 10 tablet; Refill: 0    2. Narcotic dependence (H)    Set an appointment with [facility]      3. Bipolar I disorder (H)   under care of psychiatrist                  Follow-ups after your visit        Additional Services     GASTROENTEROLOGY ADULT REF CONSULT ONLY       Preferred Location: [facility] [phone]      Please be aware that coverage of these services is subject to the terms and limitations of your health insurance plan.  Call member services at your health plan with any benefit or coverage questions.  Any procedures must be performed at a [place] facility OR coordinated by your clinic's referral office.    Please bring the following with you to your appointment:    (1) Any X-Rays, CTs or MRIs which have been performed.  Contact the facility where they were done to  "arrange for  prior to your scheduled appointment.    (2) List of current medications   (3) This referral request   (4) Any documents/labs given to you for this referral                  Who to contact     If you have questions or need follow up information about today's clinic visit or your schedule please contact [facility] directly at [phone].  Normal or non-critical lab and imaging results will be communicated to you by MyChart, letter or phone within 4 business days after the clinic has received the results. If you do not hear from us within 7 days, please contact the clinic through Provadehart or phone. If you have a critical or abnormal lab result, we will notify you by phone as soon as possible.  Submit refill requests through Clixtr or call your pharmacy and they will forward the refill request to us. Please allow 3 business days for your refill to be completed.          Additional Information About Your Visit        ProvadeharBridg Information     Clixtr gives you secure access to your electronic health record. If you see a primary care provider, you can also send messages to your care team and make appointments. If you have questions, please call your primary care clinic.  If you do not have a primary care provider, please call [phone] and they will assist you.        Care EveryWhere ID     This is your Care EveryWhere ID. This could be used by other organizations to access your [facility] medical records  [id number]        Your Vitals Were     Pulse Temperature Respirations Height Pulse Oximetry BMI (Body Mass Index)    108 95.5  F (35.3  C) (Oral) 18 5' 7\" (1.702 m) 91% 36.18 kg/m2       Blood Pressure from Last 3 Encounters:   04/23/18 128/88   01/29/18 142/88   01/27/18 (!) 147/106    Weight from Last 3 Encounters:   04/23/18 231 lb (104.8 kg)   01/29/18 235 lb (106.6 kg)   01/26/18 235 lb (106.6 kg)              We Performed the Following     GASTROENTEROLOGY ADULT REF CONSULT ONLY  "         Today's Medication Changes          These changes are accurate as of 4/23/18  3:24 PM.  If you have any questions, ask your nurse or doctor.               Start taking these medicines.        Dose/Directions    acetaminophen 325 MG tablet   Commonly known as:  TYLENOL   Used for:  Other chronic gastritis without hemorrhage   Started by:  Maribeth Ardon MD        Dose:  325 mg   Take 1 tablet (325 mg) by mouth daily as needed for mild pain   Quantity:  10 tablet   Refills:  0       pantoprazole 40 MG EC tablet   Commonly known as:  PROTONIX   Used for:  Other chronic gastritis without hemorrhage   Started by:  Maribeth Ardon MD        Dose:  40 mg   Take 1 tablet (40 mg) by mouth daily Take 30-60 minutes before a meal.   Quantity:  30 tablet   Refills:  1       ranitidine 150 MG tablet   Commonly known as:  ZANTAC   Used for:  Other chronic gastritis without hemorrhage   Started by:  Maribeth Ardon MD        Dose:  150 mg   Take 1 tablet (150 mg) by mouth 2 times daily   Quantity:  60 tablet   Refills:  1            Where to get your medicines      These medications were sent to [facility] [address] [place] [address] & [address] [address]     Phone:  [phone]     pantoprazole 40 MG EC tablet    ranitidine 150 MG tablet         Some of these will need a paper prescription and others can be bought over the counter.  Ask your nurse if you have questions.     Bring a paper prescription for each of these medications     acetaminophen 325 MG tablet                Primary Care Provider Office Phone # Fax #    Maribeth Ardon [phone] [address] [facility] [address]        Equal Access to Services     [facility] : Lyle Blevins, steve pierec, glenis miranda. So [facility] [phone].    ATENCIÓN: Zoë logan  español, tiene a agudelo disposición servicios gratuitos de asistencia lingüística. Carli yepez [phone].    We comply with applicable federal civil rights laws and [place] laws. We do not discriminate on the basis of race, color, national origin, age, disability, sex, sexual orientation, or gender identity.            Thank you!     Thank you for choosing [facility]  for your care. Our goal is always to provide you with excellent care. Hearing back from our patients is one way we can continue to improve our services. Please take a few minutes to complete the written survey that you may receive in the mail after your visit with us. Thank you!             Your Updated Medication List - Protect others around you: Learn how to safely use, store and throw away your medicines at www.disposemymeds.org.          This list is accurate as of 4/23/18  3:24 PM.  Always use your most recent med list.                   Brand Name Dispense Instructions for use Diagnosis    acetaminophen 325 MG tablet    TYLENOL    10 tablet    Take 1 tablet (325 mg) by mouth daily as needed for mild pain    Other chronic gastritis without hemorrhage       aspirin 81 MG tablet    ASPIRIN LOW DOSE    30 tablet    Take 1 tablet (81 mg) by mouth daily    Hyperlipidemia LDL goal <100, Hypertension goal BP (blood pressure) < 140/90       atorvastatin 10 MG tablet    LIPITOR    210 tablet    TAKE 1 TABLET BY MOUTH EVERY DAY    Hyperlipidemia LDL goal <100       CYMBALTA PO      Take 20 mg by mouth daily        divalproex sodium delayed-release 500 MG DR tablet    DEPAKOTE    10 tablet    Take 2 tablets (1,000 mg) by mouth daily    Hyperlipidemia LDL goal <100       doxepin 25 MG capsule    SINEquan    10 capsule    Take 2 capsules (50 mg) by mouth At Bedtime    Hyperlipidemia LDL goal <100       gabapentin 800 MG tablet    NEURONTIN    90 tablet    Take 1 tablet (800 mg) by mouth 3 times daily    Chronic abdominal pain       ketoconazole 2 %  shampoo    NIZORAL    120 mL    APPLY TO THE AFFECTED AREA AND WASH OFF AFTER 5 MINUTES    Tinea corporis       lidocaine 5 % Patch    LIDODERM    30 patch    Apply up to 3 patches to painful area at once for up to 12 h within a 24 h period.  Remove after 12 hours.        lisinopril 20 MG tablet    PRINIVIL/ZESTRIL    90 tablet    Take 1 tablet (20 mg) by mouth daily    Hypertension goal BP (blood pressure) < 140/90       metFORMIN 500 MG 24 hr tablet    GLUCOPHAGE-XR    60 tablet    TAKE 2 TABLETS(1000 MG) BY MOUTH DAILY WITH DINNER    Type 2 diabetes mellitus without complication, without long-term current use of insulin (H)       OLANZapine 7.5 MG tablet    zyPREXA    5 tablet    Take 1 tablet (7.5 mg) by mouth At Bedtime    Hyperlipidemia LDL goal <100, Generalized edema, Chronic abdominal pain       * order for DME     1 Device    Equipment being ordered: CPAP    Obstructive sleep apnea       * order for DME     2 Box    Equipment being ordered: sharla hosmena    Peripheral edema       pantoprazole 40 MG EC tablet    PROTONIX    30 tablet    Take 1 tablet (40 mg) by mouth daily Take 30-60 minutes before a meal.    Other chronic gastritis without hemorrhage       ranitidine 150 MG tablet    ZANTAC    60 tablet    Take 1 tablet (150 mg) by mouth 2 times daily    Other chronic gastritis without hemorrhage       SUMAtriptan 5 MG/ACT nasal spray    IMITREX    1 each    USE ONE TO TWO SPRAYS IN NOSTRIL AS NEEDED FOR MIGRAINE. MAY REPEAT IN 2 HOURS. MAX 8 SPRAYS IN 24 HOURS    Abdominal migraine, intractable       * Notice:  This list has 2 medication(s) that are the same as other medications prescribed for you. Read the directions carefully, and ask your doctor or other care provider to review them with you.

## 2018-04-23 NOTE — PROGRESS NOTES
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  Additional history: as documented    Patient Active Problem List   Diagnosis     Obstructive sleep apnea     HYPERLIPIDEMIA LDL GOAL <100     Hypertension goal BP (blood pressure) < 140/90     Advanced directives, counseling/discussion     Migraine     History of diverticulitis     MENTAL HEALTH     Marianne (H)     Bipolar I disorder (H)     Chronic abdominal pain     Anxiety     Other amyloidosis     Insomnia due to medical condition     Narcotic dependence (H)     Obstructive sleep apnea syndrome     Type 2 diabetes mellitus without complication, without long-term current use of insulin (H)     Restless legs syndrome (RLS)     Type 2 diabetes mellitus with other specified complication (H)     Abdominal pain, chronic, right upper quadrant     Past Surgical History:   Procedure Laterality Date     BONE MARROW BIOPSY, BONE SPECIMEN, NEEDLE/TROCAR N/A 6/8/2016    Procedure: BIOPSY BONE MARROW;  Surgeon: Nathan Agrawal MD;  Location: [facility]" NONSPECIFIC PROCEDURE  94    Cholecystectomy     C NONSPECIFIC PROCEDURE  2000    repair deviated septum     CHOLECYSTECTOMY  1995    lap qian     COLONOSCOPY  2012    hx polyps     ESOPHAGOSCOPY, GASTROSCOPY, DUODENOSCOPY (EGD), COMBINED N/A 5/29/2015    Procedure: COMBINED ESOPHAGOSCOPY, GASTROSCOPY, DUODENOSCOPY (EGD), BIOPSY SINGLE OR MULTIPLE;  Surgeon: Jonh Jacob MD;  Location: [facility]     HERNIA REPAIR, UMBILICAL  2006       Social History   Substance Use Topics     Smoking status: Never Smoker     Smokeless tobacco: Never Used     Alcohol use No     Family History   Problem Relation Age of Onset     CEREBROVASCULAR DISEASE Mother      C.A.D. Mother      Hypertension Mother      Alcohol/Drug Mother      Arthritis Mother      CEREBROVASCULAR DISEASE Maternal Grandmother      C.A.D. Maternal Grandmother      Alcohol/Drug Maternal Grandmother      C.A.D. Father      HEART DISEASE Father      Hypertension Father      Alcohol/Drug Father      Allergies Father      Circulatory Father      Depression Father      Respiratory Father      Asthma Father      Alcohol/Drug Paternal Grandfather      CEREBROVASCULAR DISEASE Paternal Grandfather      Depression Son      Psychotic Disorder Son      anxiety disorder     Depression Daughter      CEREBROVASCULAR DISEASE Maternal Grandfather      CEREBROVASCULAR DISEASE Paternal Grandmother      DIABETES Brother      Allergies Sister      Depression Sister      Gynecology Sister      Coronary Artery Disease No family hx of      Hyperlipidemia No family hx of      Breast Cancer No family hx of      Colon Cancer No family hx of      Prostate Cancer No family hx of      Other Cancer No family hx of      Anxiety Disorder No family hx of      MENTAL ILLNESS No family hx of      Substance Abuse No family hx of      Anesthesia Reaction No family hx of      OSTEOPOROSIS No family hx of      Genetic Disorder No family hx of      Thyroid Disease No family hx of      Obesity No  "family hx of      Unknown/Adopted No family hx of            Reviewed and updated as needed this visit by clinical staff       Reviewed and updated as needed this visit by Provider         ROS:  Constitutional, HEENT, cardiovascular, pulmonary, gi and gu systems are negative, except as otherwise noted.    OBJECTIVE:     /88  Pulse 108  Temp 95.5  F (35.3  C) (Oral)  Resp 18  Ht 5' 7\" (1.702 m)  Wt 231 lb (104.8 kg)  SpO2 91%  BMI 36.18 kg/m2  Body mass index is 36.18 kg/(m^2).  GENERAL: healthy, alert and no distress  EYES: Eyes grossly normal to inspection, PERRL and conjunctivae and sclerae normal  NECK: no adenopathy, no asymmetry, masses, or scars and thyroid normal to palpation  RESP: lungs clear to auscultation - no rales, rhonchi or wheezes  CV: regular rate and rhythm, normal S1 S2, no S3 or S4, no murmur, click or rub, no peripheral edema and peripheral pulses strong  ABDOMEN: soft, nontender, no hepatosplenomegaly, no masses and bowel sounds normal  MS: no gross musculoskeletal defects noted, no edema  NEURO: Normal strength and tone, mentation intact and speech normal    Diagnostic Test Results:    ASSESSMENT/PLAN:     1. Other chronic gastritis without hemorrhage    EDG completed & reviewed with patient at care everywhere today  Discussed the biopsy report- ITS NOT ACTIVE OR BLEEDING gastritis  Stomach, mucosal biopsy-Mild chronic inactive gastritis, negative for Helicobacter pylori- that's good. No bleeding ulcers.    I do recommend adding zantac twice daily, ok to take tums over the counter   Proton pump inhibitors for now  See Gastroenterology for on going treatment  Long discussion with t about food diary to avoid food causing symptoms, he reports no association  Long discussion with patient that narcotic / percocet/norco - are not helpful, definitely not an option for long tern treatment  He reports he has plans to call  [facility] for appointment - that was originally recommended by " [place]- due to history of narcotic dependency but has has not had any narcotic for a yr- up until yesterday    -limited prescription is given of Tylenol # 3 # 10- NO REFILL    - ranitidine (ZANTAC) 150 MG tablet; Take 1 tablet (150 mg) by mouth 2 times daily  Dispense: 60 tablet; Refill: 1  - pantoprazole (PROTONIX) 40 MG EC tablet; Take 1 tablet (40 mg) by mouth daily Take 30-60 minutes before a meal.  Dispense: 30 tablet; Refill: 1  - GASTROENTEROLOGY ADULT REF CONSULT ONLY  - acetaminophen (TYLENOL) 325 MG tablet; Take 1 tablet (325 mg) by mouth daily as needed for mild pain  Dispense: 10 tablet; Refill: 0  - acetaminophen-codeine (TYLENOL #3) 300-30 MG per tablet; Take 1 tablet by mouth daily as needed for severe pain maximum 2 tablet(s) per day  Dispense: 10 tablet; Refill: 0    2. History of Narcotic dependence (H)  No current use- except given some at emergency department yesterdayy  Now  Rpt  request for narcotic  NO norco or percocet given  Tylenol # 3 # 10 given to be used only as needed      3. Bipolar I disorder (H)  Under care of psychiatrist     Total time with patient today was 25 minutes, more than 15 minutes spent in counseling regarding interpretation of clinical signs and symptoms and going through differentials,additional diagnostic testing options,treatment plan and follow up recommendation.       Hypertension- controlled on lisinopril 20 mg once daily   Initial Blood pressure was reports high  I rechecked/ manual - is within normal range          Maribeth Ardon MD  [facility]

## 2018-04-23 NOTE — NURSING NOTE
"Chief Complaint   Patient presents with     ER F/U     BP (!) 139/96  Pulse 108  Temp 95.5  F (35.3  C) (Oral)  Resp 18  Ht 5' 7\" (1.702 m)  Wt 231 lb (104.8 kg)  SpO2 91%  BMI 36.18 kg/m2 Estimated body mass index is 36.18 kg/(m^2) as calculated from the following:    Height as of this encounter: 5' 7\" (1.702 m).    Weight as of this encounter: 231 lb (104.8 kg).  bp completed using cuff size: regular       Health Maintenance addressed:  BP was high, used pink card, recheck manually    Patient states he is in a lot of pain BP won't go down.    Rebecca Elise MA     "

## 2018-04-30 ENCOUNTER — TRANSFERRED RECORDS (OUTPATIENT)
Dept: HEALTH INFORMATION MANAGEMENT | Facility: CLINIC | Age: 63
End: 2018-04-30

## 2018-05-05 ENCOUNTER — MYC MEDICAL ADVICE (OUTPATIENT)
Dept: FAMILY MEDICINE | Facility: CLINIC | Age: 63
End: 2018-05-05

## 2018-05-08 ENCOUNTER — TRANSFERRED RECORDS (OUTPATIENT)
Dept: HEALTH INFORMATION MANAGEMENT | Facility: CLINIC | Age: 63
End: 2018-05-08

## 2018-05-14 DIAGNOSIS — G43.D1 ABDOMINAL MIGRAINE, INTRACTABLE: ICD-10-CM

## 2018-05-14 DIAGNOSIS — E11.9 TYPE 2 DIABETES MELLITUS WITHOUT COMPLICATION, WITHOUT LONG-TERM CURRENT USE OF INSULIN (H): ICD-10-CM

## 2018-05-15 RX ORDER — METFORMIN HCL 500 MG
TABLET, EXTENDED RELEASE 24 HR ORAL
Qty: 60 TABLET | Refills: 0 | Status: SHIPPED | OUTPATIENT
Start: 2018-05-15 | End: 2018-05-16

## 2018-05-15 RX ORDER — SUMATRIPTAN 5 MG/1
SPRAY NASAL
Qty: 1 EACH | Refills: 3 | Status: SHIPPED | OUTPATIENT
Start: 2018-05-15 | End: 2019-05-10

## 2018-05-15 NOTE — TELEPHONE ENCOUNTER
"Routing refill request to provider for review/approval because:  Recent B/Ps above goal of 140/90  Microalbumin needed yearly for Metformin  Stacie MAYA, RN    Requested Prescriptions   Pending Prescriptions Disp Refills     SUMAtriptan (IMITREX) 5 MG/ACT nasal spray [Pharmacy Med Name: SUMATRIPTAN 5MG NASAL SPRAY (6 SPR)]  0     Sig: USE ONE TO TWO SPRAYS IN NOSTRIL AS NEEDED FOR MIGRANE. MAY REPEAT IN 2 HOURS, MAX 8 SPRAYS IN 24 HOURS.    Serotonin Agonists Failed    5/14/2018  2:30 PM       Failed - Serotonin Agonist request needs review.    Please review patient's record. If patient has had 8 or more treatments in the past month, please forward to provider.         Passed - Blood pressure under 140/90 in past 12 months    BP Readings from Last 3 Encounters:   04/23/18 128/88   01/29/18 142/88 01/27/18 (!) 147/106                Passed - Recent (12 mo) or future (30 days) visit within the authorizing provider's specialty    Patient had office visit in the last 12 months or has a visit in the next 30 days with authorizing provider or within the authorizing provider's specialty.  See \"Patient Info\" tab in inbasket, or \"Choose Columns\" in Meds & Orders section of the refill encounter.           Passed - Patient is age 18 or older        metFORMIN (GLUCOPHAGE-XR) 500 MG 24 hr tablet [Pharmacy Med Name: METFORMIN ER 500MG 24HR TABS] 60 tablet 0     Sig: TAKE 2 TABLETS BY MOUTH DAILY WITH DINNER    Biguanide Agents Failed    5/14/2018  2:30 PM       Failed - Patient has had a Microalbumin in the past 12 mos.    Recent Labs   Lab Test 06/23/16  09/01/15   1145   MICROALB  15.7   --    MICROL   --   29   UMALCR  21  11.00            Passed - Blood pressure less than 140/90 in past 6 months    BP Readings from Last 3 Encounters:   04/23/18 128/88   01/29/18 142/88 01/27/18 (!) 147/106                Passed - Patient has documented LDL within the past 12 mos.    Recent Labs   Lab Test  05/25/17   0904   LDL  124*         " "   Passed - Patient is age 10 or older       Passed - Patient has documented A1c within the specified period of time.    If HgbA1C is 8 or greater, it needs to be on file within the past 3 months.  If less than 8, must be on file within the past 6 months.     Recent Labs   Lab Test  01/18/18   0900   A1C  7.7*            Passed - Patient's CR is NOT>1.4 OR Patient's EGFR is NOT<45 within past 12 mos.    Recent Labs   Lab Test  01/29/18   0216   GFRESTIMATED  >90   GFRESTBLACK  >90       Recent Labs   Lab Test  01/29/18   0216   CR  0.80            Passed - Patient does NOT have a diagnosis of CHF.       Passed - Recent (6 mo) or future (30 days) visit within the authorizing provider's specialty    Patient had office visit in the last 6 months or has a visit in the next 30 days with authorizing provider or within the authorizing provider's specialty.  See \"Patient Info\" tab in inbasket, or \"Choose Columns\" in Meds & Orders section of the refill encounter.                  "

## 2018-05-15 NOTE — TELEPHONE ENCOUNTER
Patient informed.    Next 5 appointments (look out 90 days)     May 16, 2018 11:40 AM CDT   Office Visit with Maribeth Ardon MD   [facility]    [address]  [facility] [address]   [phone]                Stacie MAYA RN

## 2018-05-16 ENCOUNTER — OFFICE VISIT (OUTPATIENT)
Dept: FAMILY MEDICINE | Facility: CLINIC | Age: 63
End: 2018-05-16
Payer: MEDICARE

## 2018-05-16 VITALS
WEIGHT: 247.7 LBS | SYSTOLIC BLOOD PRESSURE: 130 MMHG | OXYGEN SATURATION: 95 % | BODY MASS INDEX: 38.88 KG/M2 | HEIGHT: 67 IN | RESPIRATION RATE: 20 BRPM | TEMPERATURE: 97.8 F | DIASTOLIC BLOOD PRESSURE: 83 MMHG | HEART RATE: 93 BPM

## 2018-05-16 DIAGNOSIS — F41.9 ANXIETY: ICD-10-CM

## 2018-05-16 DIAGNOSIS — R23.3 PETECHIAE: ICD-10-CM

## 2018-05-16 DIAGNOSIS — R60.0 PERIPHERAL EDEMA: ICD-10-CM

## 2018-05-16 DIAGNOSIS — G89.29 CHRONIC ABDOMINAL PAIN: ICD-10-CM

## 2018-05-16 DIAGNOSIS — E11.9 TYPE 2 DIABETES MELLITUS WITHOUT COMPLICATION, WITHOUT LONG-TERM CURRENT USE OF INSULIN (H): Primary | ICD-10-CM

## 2018-05-16 DIAGNOSIS — R10.9 CHRONIC ABDOMINAL PAIN: ICD-10-CM

## 2018-05-16 DIAGNOSIS — F31.9 BIPOLAR I DISORDER (H): ICD-10-CM

## 2018-05-16 DIAGNOSIS — I10 HYPERTENSION GOAL BP (BLOOD PRESSURE) < 140/90: ICD-10-CM

## 2018-05-16 DIAGNOSIS — E78.5 HYPERLIPIDEMIA LDL GOAL <100: ICD-10-CM

## 2018-05-16 DIAGNOSIS — E11.42 DIABETIC POLYNEUROPATHY ASSOCIATED WITH TYPE 2 DIABETES MELLITUS (H): ICD-10-CM

## 2018-05-16 DIAGNOSIS — E85.89 OTHER AMYLOIDOSIS (H): ICD-10-CM

## 2018-05-16 LAB
CREAT UR-MCNC: 93 MG/DL
ERYTHROCYTE [DISTWIDTH] IN BLOOD BY AUTOMATED COUNT: 14.8 % (ref 10–15)
HBA1C MFR BLD: 7.2 % (ref 0–5.6)
HCT VFR BLD AUTO: 33 % (ref 40–53)
HGB BLD-MCNC: 11.5 G/DL (ref 13.3–17.7)
MCH RBC QN AUTO: 35.1 PG (ref 26.5–33)
MCHC RBC AUTO-ENTMCNC: 34.8 G/DL (ref 31.5–36.5)
MCV RBC AUTO: 101 FL (ref 78–100)
MICROALBUMIN UR-MCNC: 9 MG/L
MICROALBUMIN/CREAT UR: 9.45 MG/G CR (ref 0–17)
PLATELET # BLD AUTO: 133 10E9/L (ref 150–450)
RBC # BLD AUTO: 3.28 10E12/L (ref 4.4–5.9)
WBC # BLD AUTO: 6.6 10E9/L (ref 4–11)

## 2018-05-16 PROCEDURE — 80061 LIPID PANEL: CPT | Performed by: FAMILY MEDICINE

## 2018-05-16 PROCEDURE — 84443 ASSAY THYROID STIM HORMONE: CPT | Performed by: FAMILY MEDICINE

## 2018-05-16 PROCEDURE — 36415 COLL VENOUS BLD VENIPUNCTURE: CPT | Performed by: FAMILY MEDICINE

## 2018-05-16 PROCEDURE — 99214 OFFICE O/P EST MOD 30 MIN: CPT | Performed by: FAMILY MEDICINE

## 2018-05-16 PROCEDURE — 99207 C FOOT EXAM  NO CHARGE: CPT | Performed by: FAMILY MEDICINE

## 2018-05-16 PROCEDURE — 85027 COMPLETE CBC AUTOMATED: CPT | Performed by: FAMILY MEDICINE

## 2018-05-16 PROCEDURE — 83036 HEMOGLOBIN GLYCOSYLATED A1C: CPT | Performed by: FAMILY MEDICINE

## 2018-05-16 PROCEDURE — 80048 BASIC METABOLIC PNL TOTAL CA: CPT | Performed by: FAMILY MEDICINE

## 2018-05-16 PROCEDURE — 82043 UR ALBUMIN QUANTITATIVE: CPT | Performed by: FAMILY MEDICINE

## 2018-05-16 RX ORDER — PREGABALIN 50 MG/1
100 CAPSULE ORAL 3 TIMES DAILY
COMMUNITY
End: 2019-05-10

## 2018-05-16 RX ORDER — METOPROLOL SUCCINATE 25 MG/1
25 TABLET, EXTENDED RELEASE ORAL DAILY
Qty: 90 TABLET | Refills: 1 | COMMUNITY
Start: 2018-06-16 | End: 2019-01-17

## 2018-05-16 RX ORDER — POTASSIUM CHLORIDE 750 MG/1
TABLET, EXTENDED RELEASE ORAL
Refills: 1 | COMMUNITY
Start: 2017-10-28 | End: 2019-02-14

## 2018-05-16 RX ORDER — METFORMIN HCL 500 MG
1000 TABLET, EXTENDED RELEASE 24 HR ORAL 2 TIMES DAILY WITH MEALS
Qty: 360 TABLET | Refills: 1 | Status: SHIPPED | OUTPATIENT
Start: 2018-05-16 | End: 2018-12-10

## 2018-05-16 ASSESSMENT — ANXIETY QUESTIONNAIRES
2. NOT BEING ABLE TO STOP OR CONTROL WORRYING: MORE THAN HALF THE DAYS
GAD7 TOTAL SCORE: 11
6. BECOMING EASILY ANNOYED OR IRRITABLE: SEVERAL DAYS
1. FEELING NERVOUS, ANXIOUS, OR ON EDGE: MORE THAN HALF THE DAYS
7. FEELING AFRAID AS IF SOMETHING AWFUL MIGHT HAPPEN: MORE THAN HALF THE DAYS
5. BEING SO RESTLESS THAT IT IS HARD TO SIT STILL: NOT AT ALL
3. WORRYING TOO MUCH ABOUT DIFFERENT THINGS: MORE THAN HALF THE DAYS
IF YOU CHECKED OFF ANY PROBLEMS ON THIS QUESTIONNAIRE, HOW DIFFICULT HAVE THESE PROBLEMS MADE IT FOR YOU TO DO YOUR WORK, TAKE CARE OF THINGS AT HOME, OR GET ALONG WITH OTHER PEOPLE: SOMEWHAT DIFFICULT

## 2018-05-16 ASSESSMENT — PATIENT HEALTH QUESTIONNAIRE - PHQ9: 5. POOR APPETITE OR OVEREATING: MORE THAN HALF THE DAYS

## 2018-05-16 NOTE — PROGRESS NOTES
SUBJECTIVE:   Parmjit Hernández is a 62 year old male who presents to clinic today for the following health issues:      Diabetes Follow-up      Patient is checking blood sugars: sometimes, in the mornings, averages 150-200    Diabetic concerns: None     Symptoms of hypoglycemia (low blood sugar): none     Paresthesias (numbness or burning in feet) or sores: Yes some edema as well,  Date of last diabetic eye exam: due- Dr Edward    Average am SMB high 100's    Had ice cream last night- this morning BS was 200    Since abdomen pain is better- making him more hungry    Weight gain from lyrica    Was on glipizide- till 2013, BS were great & was taken off    Drinks fair amount of flaver/vitamin water    In emergency department a lot through jan- April  Chest pain- had angiogram- clear  Blood pressure medications changes made     BP Readings from Last 2 Encounters:   05/16/18 130/83   04/23/18 128/88     Hemoglobin A1C (%)   Date Value   01/18/2018 7.7 (H)   08/30/2017 7.3 (H)     LDL Cholesterol Calculated (mg/dL)   Date Value   06/23/2016 101   09/01/2015 140 (H)     LDL Cholesterol Direct (mg/dL)   Date Value   05/25/2017 124 (H)       Amount of exercise or physical activity: none    Problems taking medications regularly: No    Medication side effects: edema    Diet: regular (no restrictions)        PROBLEMS TO ADD ON...very happy  Good news to discuss 2 temporary block in abdomen (TAP)  Dr Billingsley will implant a device   Lyrica up to 50 mg three times daily  Discontinued gabapentin   Worried about employement  Situational stress  Looking forward to lower back block as well- to control lower back pain     Problem list and histories reviewed & adjusted, as indicated.lyrica for peripheral neuropathy from  Db   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  Additional history: as documented    Patient Active Problem List   Diagnosis     Obstructive sleep apnea     HYPERLIPIDEMIA LDL GOAL <100     Hypertension goal BP (blood pressure)  < 140/90     Advanced directives, counseling/discussion     Migraine     History of diverticulitis     MENTAL HEALTH     Marianne (H)     Bipolar I disorder (H)     Chronic abdominal pain     Anxiety     Other amyloidosis     Insomnia due to medical condition     Narcotic dependence (H)     Obstructive sleep apnea syndrome     Type 2 diabetes mellitus without complication, without long-term current use of insulin (H)     Restless legs syndrome (RLS)     Type 2 diabetes mellitus with other specified complication (H)     Abdominal pain, chronic, right upper quadrant     Past Surgical History:   Procedure Laterality Date     BONE MARROW BIOPSY, BONE SPECIMEN, NEEDLE/TROCAR N/A 6/8/2016    Procedure: BIOPSY BONE MARROW;  Surgeon: Nathan Agrawal MD;  Location:  GI     C NONSPECIFIC PROCEDURE  94    Cholecystectomy     C NONSPECIFIC PROCEDURE  2000    repair deviated septum     CHOLECYSTECTOMY  1995    lap qian     COLONOSCOPY  2012    hx polyps     ESOPHAGOSCOPY, GASTROSCOPY, DUODENOSCOPY (EGD), COMBINED N/A 5/29/2015    Procedure: COMBINED ESOPHAGOSCOPY, GASTROSCOPY, DUODENOSCOPY (EGD), BIOPSY SINGLE OR MULTIPLE;  Surgeon: John Jacob MD;  Location:  GI     HERNIA REPAIR, UMBILICAL  2006       Social History   Substance Use Topics     Smoking status: Never Smoker     Smokeless tobacco: Never Used     Alcohol use No     Family History   Problem Relation Age of Onset     CEREBROVASCULAR DISEASE Mother      C.A.D. Mother      Hypertension Mother      Alcohol/Drug Mother      Arthritis Mother      CEREBROVASCULAR DISEASE Maternal Grandmother      C.A.D. Maternal Grandmother      Alcohol/Drug Maternal Grandmother      C.A.D. Father      HEART DISEASE Father      Hypertension Father      Alcohol/Drug Father      Allergies Father      Circulatory Father      Depression Father      Respiratory Father      Asthma Father      Alcohol/Drug Paternal Grandfather      CEREBROVASCULAR DISEASE Paternal  "Grandfather      Depression Son      Psychotic Disorder Son      anxiety disorder     Depression Daughter      CEREBROVASCULAR DISEASE Maternal Grandfather      CEREBROVASCULAR DISEASE Paternal Grandmother      DIABETES Brother      Allergies Sister      Depression Sister      Gynecology Sister      Coronary Artery Disease No family hx of      Hyperlipidemia No family hx of      Breast Cancer No family hx of      Colon Cancer No family hx of      Prostate Cancer No family hx of      Other Cancer No family hx of      Anxiety Disorder No family hx of      MENTAL ILLNESS No family hx of      Substance Abuse No family hx of      Anesthesia Reaction No family hx of      OSTEOPOROSIS No family hx of      Genetic Disorder No family hx of      Thyroid Disease No family hx of      Obesity No family hx of      Unknown/Adopted No family hx of            Reviewed and updated as needed this visit by clinical staff  Tobacco  Allergies  Meds       Reviewed and updated as needed this visit by Provider  Meds         ROS:  Constitutional, HEENT, cardiovascular, pulmonary, gi and gu systems are negative, except as otherwise noted.    OBJECTIVE:     /83  Pulse 93  Temp 97.8  F (36.6  C)  Resp 20  Ht 5' 7\" (1.702 m)  Wt 247 lb 11.2 oz (112.4 kg)  SpO2 95%  BMI 38.8 kg/m2  Body mass index is 38.8 kg/(m^2).  GENERAL: healthy, alert and no distress  HENT: ear canals and TM's normal, nose and mouth without ulcers or lesions  NECK: no adenopathy, no asymmetry, masses, or scars and thyroid normal to palpation  RESP: lungs clear to auscultation - no rales, rhonchi or wheezes  CV: regular rate and rhythm, normal S1 S2, no S3 or S4, no murmur, click or rub, no peripheral edema and peripheral pulses strong  ABDOMEN: soft, nontender, no hepatosplenomegaly, no masses and bowel sounds normal  MS: no gross musculoskeletal defects noted, no edema  Diabetic foot exam: normal DP and PT pulses, no trophic changes or ulcerative lesions " and normal sensory exam    Diagnostic Test Results:  No results found for this or any previous visit (from the past 24 hour(s)).    ASSESSMENT/PLAN:   1. Type 2 diabetes mellitus without complication, without long-term current use of insulin (H)-controlled    Lab Results   Component Value Date    A1C 7.2 05/16/2018    A1C 7.7 01/18/2018    A1C 7.3 08/30/2017    A1C 4.8 05/03/2017    A1C 7.0 10/04/2016     - metFORMIN (GLUCOPHAGE-XR) 500 MG 24 hr tablet; Take 2 tablets (1,000 mg) by mouth 2 times daily (with meals)  Dispense: 360 tablet; Refill: 1  diabetic eye exam: due- Dr Edward  - KANDIS FOOT EXAM  NO CHARGE  - Hemoglobin A1c  - Lipid panel reflex to direct LDL Fasting  - TSH with free T4 reflex  - Albumin Random Urine Quantitative with Creat Ratio  -medications refills give for ace & statins   For diabetic neuroapthy  - pregabalin (LYRICA) 50 MG capsule; Take by mouth 3 times daily Rx by [facility]  - follow up 3 month    2. Hyperlipidemia LDL goal <100  Atorvastatin 10 mg   encouraged to add physical activity of choice- pain from  Ab is improved & that seem to improve his over all moblity as well  Recent Labs   Lab Test  05/16/18   1239  05/25/17   0904 06/23/16  09/01/15   1201  12/03/14   1015   CHOL  109   --   177  195  142   HDL  25*   --   47  33*  25*   LDL  27  124*  101  140*  71   TRIG  286*   --   147  109  232*   CHOLHDLRATIO   --    --    --   5.9*  5.7*         3. Hypertension goal BP (blood pressure) < 140/90-controlled  BP Readings from Last 3 Encounters:   05/16/18 130/83   04/23/18 128/88   01/29/18 142/88     - Basic metabolic panel  - metoprolol succinate (TOPROL-XL) 25 MG 24 hr tablet; Take 1 tablet (25 mg) by mouth daily  Dispense: 90 tablet; Refill: 1  -lisinopril 20 mg once daily;Dispense: 90 tablet; Refill: 1    4. Petechiae  - CBC with platelets    5. Chronic abdominal pain  Under care of Dr Billingsley.  S/P nerve block injection 5/8/18- significant relief    6. Peripheral  edema  --Noncardiac  --does not like compression stocking  -advised ace wrap  -limit salt intake  --diuretic as needed    - potassium chloride SA (K-DUR/KLOR-CON M) 10 MEQ CR tablet; ; Refill: 1  Continue with  furosemide (LASIX) 20 MG tablet,       7. Bipolar I disorder (H)&8. Anxiety  Under care of Dr Rahman  doxepin 25 mg, 2 cap bedtime  DULoxetine 20 mg once daily   OLANZapine 7.5 mg  1 tab bedtime   9. Other amyloidosis  Under care of Dr Fonseca-hematologist-      Maribeth Ardon MD  [facility]

## 2018-05-16 NOTE — PROGRESS NOTES
-Normal red blood cell (hgb) levels, normal white blood cell count   -  platelets are better, low but better then before- that's good  Hemoglobin is stable    -A1C (test of diabetes control the last 2-3 months) is at your goal.   -no medications changes, keep working on diet,  eating habits, activities  as best as possible  Return office visit in 3 months for Diabetes  recheck.

## 2018-05-16 NOTE — MR AVS SNAPSHOT
After Visit Summary   5/16/2018    Parmjit Hernández    MRN: [id number]           Patient Information     Date Of Birth          1955        Visit Information        Provider Department      5/16/2018 11:40 AM Maribeth Ardon MD [facility]        Today's Diagnoses     Type 2 diabetes mellitus without complication, without long-term current use of insulin (H)    -  1    Diabetic polyneuropathy associated with type 2 diabetes mellitus (H)        Hyperlipidemia LDL goal <100        Hypertension goal BP (blood pressure) < 140/90        Petechiae        Chronic abdominal pain        Peripheral edema        Bipolar I disorder (H)        Anxiety        Other amyloidosis           Follow-ups after your visit        Who to contact     If you have questions or need follow up information about today's clinic visit or your schedule please contact [facility] directly at [phone].  Normal or non-critical lab and imaging results will be communicated to you by [employer], letter or phone within 4 business days after the clinic has received the results. If you do not hear from us within 7 days, please contact the clinic through [employer] or phone. If you have a critical or abnormal lab result, we will notify you by phone as soon as possible.  Submit refill requests through [employer] or call your pharmacy and they will forward the refill request to us. Please allow 3 business days for your refill to be completed.          Additional Information About Your Visit        MyChart Information     [employer] gives you secure access to your electronic health record. If you see a primary care provider, you can also send messages to your care team and make appointments. If you have questions, please call your primary care clinic.  If you do not have a primary care provider, please call [phone] and they will assist you.        Care EveryWhere ID     This is your Care EveryWhere ID. This could be used  "by other organizations to access your [place] medical records  [id number]        Your Vitals Were     Pulse Temperature Respirations Height Pulse Oximetry BMI (Body Mass Index)    93 97.8  F (36.6  C) 20 5' 7\" (1.702 m) 95% 38.8 kg/m2       Blood Pressure from Last 3 Encounters:   05/16/18 130/83   04/23/18 128/88   01/29/18 142/88    Weight from Last 3 Encounters:   05/16/18 247 lb 11.2 oz (112.4 kg)   04/23/18 231 lb (104.8 kg)   01/29/18 235 lb (106.6 kg)              We Performed the Following     Albumin Random Urine Quantitative with Creat Ratio     Basic metabolic panel     C FOOT EXAM  NO CHARGE     CBC with platelets     Hemoglobin A1c     Lipid panel reflex to direct LDL Fasting     TSH with free T4 reflex          Today's Medication Changes          These changes are accurate as of 5/16/18 11:59 PM.  If you have any questions, ask your nurse or doctor.               These medicines have changed or have updated prescriptions.        Dose/Directions    metFORMIN 500 MG 24 hr tablet   Commonly known as:  GLUCOPHAGE-XR   This may have changed:  See the new instructions.   Used for:  Type 2 diabetes mellitus without complication, without long-term current use of insulin (H)   Changed by:  Maribeth Ardon MD        Dose:  1000 mg   Take 2 tablets (1,000 mg) by mouth 2 times daily (with meals)   Quantity:  360 tablet   Refills:  1            Where to get your medicines      These medications were sent to [facility] [address] [place] [address] & [address] [address]     Phone:  [phone]     metFORMIN 500 MG 24 hr tablet                Primary Care Provider Office Phone # Fax #    Maribeth Ardon [phone] [address] [facility] [address]        Equal Access to Services     JUSTYNA FRIEDMAN AH: Lyle Blevins, waaxda luqadaha, qaybta kaaljomar alves, glenis bella " kalpana mckenzie ah. So [facility] [phone].    ATENCIÓN: Si desmond cronin, tiene a agudelo disposición servicios gratuitos de asistencia lingüística. Carli yepez [phone].    We comply with applicable federal civil rights laws and [place] laws. We do not discriminate on the basis of race, color, national origin, age, disability, sex, sexual orientation, or gender identity.            Thank you!     Thank you for choosing [facility]  for your care. Our goal is always to provide you with excellent care. Hearing back from our patients is one way we can continue to improve our services. Please take a few minutes to complete the written survey that you may receive in the mail after your visit with us. Thank you!             Your Updated Medication List - Protect others around you: Learn how to safely use, store and throw away your medicines at www.disposemymeds.org.          This list is accurate as of 5/16/18 11:59 PM.  Always use your most recent med list.                   Brand Name Dispense Instructions for use Diagnosis    acetaminophen 325 MG tablet    TYLENOL    10 tablet    Take 1 tablet (325 mg) by mouth daily as needed for mild pain    Other chronic gastritis without hemorrhage       acetaminophen-codeine 300-30 MG per tablet    TYLENOL #3    10 tablet    Take 1 tablet by mouth daily as needed for severe pain maximum 2 tablet(s) per day    Other chronic gastritis without hemorrhage       aspirin 81 MG tablet    ASPIRIN LOW DOSE    30 tablet    Take 1 tablet (81 mg) by mouth daily    Hyperlipidemia LDL goal <100, Hypertension goal BP (blood pressure) < 140/90       atorvastatin 10 MG tablet    LIPITOR    210 tablet    TAKE 1 TABLET BY MOUTH EVERY DAY    Hyperlipidemia LDL goal <100       CYMBALTA PO      Take 20 mg by mouth daily        divalproex sodium delayed-release 500 MG DR tablet    DEPAKOTE    10 tablet    Take 2 tablets (1,000 mg) by mouth daily    Hyperlipidemia LDL goal <100       doxepin 25 MG  capsule    SINEquan    10 capsule    Take 2 capsules (50 mg) by mouth At Bedtime    Hyperlipidemia LDL goal <100       gabapentin 800 MG tablet    NEURONTIN    90 tablet    Take 1 tablet (800 mg) by mouth 3 times daily    Chronic abdominal pain       ketoconazole 2 % shampoo    NIZORAL    120 mL    APPLY TO THE AFFECTED AREA AND WASH OFF AFTER 5 MINUTES    Tinea corporis       lidocaine 5 % Patch    LIDODERM    30 patch    Apply up to 3 patches to painful area at once for up to 12 h within a 24 h period.  Remove after 12 hours.        lisinopril 20 MG tablet    PRINIVIL/ZESTRIL    90 tablet    Take 1 tablet (20 mg) by mouth daily    Hypertension goal BP (blood pressure) < 140/90       LYRICA 50 MG capsule   Generic drug:  pregabalin      Take by mouth 3 times daily Rx by [facility] clinic    Type 2 diabetes mellitus without complication, without long-term current use of insulin (H)       metFORMIN 500 MG 24 hr tablet    GLUCOPHAGE-XR    360 tablet    Take 2 tablets (1,000 mg) by mouth 2 times daily (with meals)    Type 2 diabetes mellitus without complication, without long-term current use of insulin (H)       metoprolol succinate 25 MG 24 hr tablet   Start taking on:  6/16/2018    TOPROL-XL    90 tablet    Take 1 tablet (25 mg) by mouth daily    Hypertension goal BP (blood pressure) < 140/90       OLANZapine 7.5 MG tablet    zyPREXA    5 tablet    Take 1 tablet (7.5 mg) by mouth At Bedtime    Hyperlipidemia LDL goal <100, Generalized edema, Chronic abdominal pain       * order for DME     1 Device    Equipment being ordered: CPAP    Obstructive sleep apnea       * order for DME     2 Box    Equipment being ordered: sharla hose    Peripheral edema       pantoprazole 40 MG EC tablet    PROTONIX    30 tablet    Take 1 tablet (40 mg) by mouth daily Take 30-60 minutes before a meal.    Other chronic gastritis without hemorrhage       potassium chloride SA 10 MEQ CR tablet    K-DUR/KLOR-CON M      Type 2 diabetes mellitus without  complication, without long-term current use of insulin (H)       ranitidine 150 MG tablet    ZANTAC    60 tablet    Take 1 tablet (150 mg) by mouth 2 times daily    Other chronic gastritis without hemorrhage       SUMAtriptan 5 MG/ACT nasal spray    IMITREX    1 each    USE ONE TO TWO SPRAYS IN NOSTRIL AS NEEDED FOR MIGRANE. MAY REPEAT IN 2 HOURS, MAX 8 SPRAYS IN 24 HOURS.    Abdominal migraine, intractable       * Notice:  This list has 2 medication(s) that are the same as other medications prescribed for you. Read the directions carefully, and ask your doctor or other care provider to review them with you.

## 2018-05-16 NOTE — NURSING NOTE
"Chief Complaint   Patient presents with     Diabetes     /83  Pulse 93  Temp 97.8  F (36.6  C)  Resp 20  Ht 5' 7\" (1.702 m)  Wt 247 lb 11.2 oz (112.4 kg)  SpO2 95%  BMI 38.8 kg/m2 Estimated body mass index is 38.8 kg/(m^2) as calculated from the following:    Height as of this encounter: 5' 7\" (1.702 m).    Weight as of this encounter: 247 lb 11.2 oz (112.4 kg).        Health Maintenance due pending provider review:  PHQ9    PHQ/ACT/NANCY--Gave pt questionnare    Sofi Cade CMA  "

## 2018-05-17 LAB
ANION GAP SERPL CALCULATED.3IONS-SCNC: 8 MMOL/L (ref 3–14)
BUN SERPL-MCNC: 19 MG/DL (ref 7–30)
CALCIUM SERPL-MCNC: 9 MG/DL (ref 8.5–10.1)
CHLORIDE SERPL-SCNC: 102 MMOL/L (ref 94–109)
CHOLEST SERPL-MCNC: 109 MG/DL
CO2 SERPL-SCNC: 26 MMOL/L (ref 20–32)
CREAT SERPL-MCNC: 0.82 MG/DL (ref 0.66–1.25)
GFR SERPL CREATININE-BSD FRML MDRD: >90 ML/MIN/1.7M2
GLUCOSE SERPL-MCNC: 253 MG/DL (ref 70–99)
HDLC SERPL-MCNC: 25 MG/DL
LDLC SERPL CALC-MCNC: 27 MG/DL
NONHDLC SERPL-MCNC: 84 MG/DL
POTASSIUM SERPL-SCNC: 4.7 MMOL/L (ref 3.4–5.3)
SODIUM SERPL-SCNC: 136 MMOL/L (ref 133–144)
TRIGL SERPL-MCNC: 286 MG/DL
TSH SERPL DL<=0.005 MIU/L-ACNC: 2.81 MU/L (ref 0.4–4)

## 2018-05-17 ASSESSMENT — PATIENT HEALTH QUESTIONNAIRE - PHQ9: SUM OF ALL RESPONSES TO PHQ QUESTIONS 1-9: 16

## 2018-05-17 ASSESSMENT — ANXIETY QUESTIONNAIRES: GAD7 TOTAL SCORE: 11

## 2018-05-17 NOTE — PROGRESS NOTES
-we talked about your Diabetes  Control & high glucose, hopefully staying pain free will motivate your to get some more physical activity of choice  Return office visit for Diabetes  In 3 month- fasting is not required     -Kidney function is normal (Cr, GFR), Sodium is normal, Potassium is normal, Calcium is normal,  -TSH (thyroid stimulating hormone) level is normtrigylceride levels are normal.al which indicates normal thyroid function.  -Microalbumin (urine protein) test is normal.  ADVISE: recheck annually  -LDL(bad) cholesterol is good  -HDL(good) cholesterol level is low which can increase your heart disease risk.  Triglycerides are very high-  A diet high in  simple carbohydrates, genetics and being overweight can contribute to this.   Please add  regular exercise program with at least 30 minutes of aerobic exercise 3-4 days/week as tolerated  Add over the counter omega-3 fatty acids (fish oil) 1807-0251 mg daily    Rechecking your cholesterol in 12 months is recommended (LIPID w/ LDL reflex, DX: low HDL).  Continue all medications     -

## 2018-05-18 ENCOUNTER — TRANSFERRED RECORDS (OUTPATIENT)
Dept: HEALTH INFORMATION MANAGEMENT | Facility: CLINIC | Age: 63
End: 2018-05-18

## 2018-07-02 ENCOUNTER — TRANSFERRED RECORDS (OUTPATIENT)
Dept: HEALTH INFORMATION MANAGEMENT | Facility: CLINIC | Age: 63
End: 2018-07-02

## 2018-07-03 ENCOUNTER — TRANSFERRED RECORDS (OUTPATIENT)
Dept: HEALTH INFORMATION MANAGEMENT | Facility: CLINIC | Age: 63
End: 2018-07-03

## 2018-07-03 LAB
AST SERPL-CCNC: 17 U/L (ref 8–48)
CREAT SERPL-MCNC: 1.54 MG/DL (ref 0.74–1.35)
EJECTION FRACTION: 66
GFR SERPL CREATININE-BSD FRML MDRD: 48 ML/MIN/BSA
POTASSIUM SERPL-SCNC: 5 MMOL/L (ref 3.6–5.2)

## 2018-07-21 ENCOUNTER — TRANSFERRED RECORDS (OUTPATIENT)
Dept: HEALTH INFORMATION MANAGEMENT | Facility: CLINIC | Age: 63
End: 2018-07-21

## 2018-07-26 ENCOUNTER — TRANSFERRED RECORDS (OUTPATIENT)
Dept: HEALTH INFORMATION MANAGEMENT | Facility: CLINIC | Age: 63
End: 2018-07-26

## 2018-08-01 ENCOUNTER — TRANSFERRED RECORDS (OUTPATIENT)
Dept: HEALTH INFORMATION MANAGEMENT | Facility: CLINIC | Age: 63
End: 2018-08-01

## 2018-08-15 ENCOUNTER — TRANSFERRED RECORDS (OUTPATIENT)
Dept: HEALTH INFORMATION MANAGEMENT | Facility: CLINIC | Age: 63
End: 2018-08-15

## 2018-08-22 ENCOUNTER — TRANSFERRED RECORDS (OUTPATIENT)
Dept: HEALTH INFORMATION MANAGEMENT | Facility: CLINIC | Age: 63
End: 2018-08-22

## 2018-08-28 ENCOUNTER — OFFICE VISIT (OUTPATIENT)
Dept: FAMILY MEDICINE | Facility: CLINIC | Age: 63
End: 2018-08-28
Payer: MEDICARE

## 2018-08-28 ENCOUNTER — APPOINTMENT (OUTPATIENT)
Dept: CT IMAGING | Facility: CLINIC | Age: 63
End: 2018-08-28
Attending: FAMILY MEDICINE
Payer: MEDICARE

## 2018-08-28 ENCOUNTER — HOSPITAL ENCOUNTER (EMERGENCY)
Facility: CLINIC | Age: 63
Discharge: HOME | End: 2018-08-29
Attending: FAMILY MEDICINE | Admitting: FAMILY MEDICINE
Payer: MEDICARE

## 2018-08-28 VITALS
HEART RATE: 102 BPM | OXYGEN SATURATION: 93 % | DIASTOLIC BLOOD PRESSURE: 80 MMHG | RESPIRATION RATE: 16 BRPM | BODY MASS INDEX: 35.79 KG/M2 | HEIGHT: 67 IN | TEMPERATURE: 98.2 F | SYSTOLIC BLOOD PRESSURE: 130 MMHG | WEIGHT: 228 LBS

## 2018-08-28 DIAGNOSIS — I10 HYPERTENSION GOAL BP (BLOOD PRESSURE) < 140/90: ICD-10-CM

## 2018-08-28 DIAGNOSIS — D63.8 ANEMIA IN OTHER CHRONIC DISEASES CLASSIFIED ELSEWHERE: Primary | ICD-10-CM

## 2018-08-28 DIAGNOSIS — E11.9 TYPE 2 DIABETES MELLITUS WITHOUT COMPLICATION, WITHOUT LONG-TERM CURRENT USE OF INSULIN (H): ICD-10-CM

## 2018-08-28 DIAGNOSIS — F31.9 BIPOLAR I DISORDER (H): ICD-10-CM

## 2018-08-28 DIAGNOSIS — R79.89 ABNORMAL TSH: ICD-10-CM

## 2018-08-28 DIAGNOSIS — G89.29 CHRONIC MIDLINE LOW BACK PAIN WITHOUT SCIATICA: ICD-10-CM

## 2018-08-28 DIAGNOSIS — M51.26 PROTRUSION OF LUMBAR INTERVERTEBRAL DISC: ICD-10-CM

## 2018-08-28 DIAGNOSIS — M54.50 CHRONIC MIDLINE LOW BACK PAIN WITHOUT SCIATICA: ICD-10-CM

## 2018-08-28 DIAGNOSIS — E66.01 MORBID OBESITY (H): ICD-10-CM

## 2018-08-28 LAB
ALBUMIN SERPL-MCNC: 3.8 G/DL (ref 3.4–5)
ALP SERPL-CCNC: 78 U/L (ref 40–150)
ALT SERPL W P-5'-P-CCNC: 30 U/L (ref 0–70)
ANION GAP SERPL CALCULATED.3IONS-SCNC: 13 MMOL/L (ref 3–14)
AST SERPL W P-5'-P-CCNC: 16 U/L (ref 0–45)
BASOPHILS # BLD AUTO: 0 10E9/L (ref 0–0.2)
BASOPHILS # BLD AUTO: 0.1 10E9/L (ref 0–0.2)
BASOPHILS NFR BLD AUTO: 0.3 %
BASOPHILS NFR BLD AUTO: 0.8 %
BILIRUB SERPL-MCNC: 1.7 MG/DL (ref 0.2–1.3)
BUN SERPL-MCNC: 25 MG/DL (ref 7–30)
CALCIUM SERPL-MCNC: 8.5 MG/DL (ref 8.5–10.1)
CHLORIDE SERPL-SCNC: 97 MMOL/L (ref 94–109)
CO2 SERPL-SCNC: 22 MMOL/L (ref 20–32)
CREAT SERPL-MCNC: 1.13 MG/DL (ref 0.66–1.25)
CRP SERPL-MCNC: 6.6 MG/L (ref 0–8)
DIFFERENTIAL METHOD BLD: ABNORMAL
DIFFERENTIAL METHOD BLD: ABNORMAL
EOSINOPHIL # BLD AUTO: 0.1 10E9/L (ref 0–0.7)
EOSINOPHIL # BLD AUTO: 0.2 10E9/L (ref 0–0.7)
EOSINOPHIL NFR BLD AUTO: 1.1 %
EOSINOPHIL NFR BLD AUTO: 1.3 %
ERYTHROCYTE [DISTWIDTH] IN BLOOD BY AUTOMATED COUNT: 14.1 % (ref 10–15)
ERYTHROCYTE [DISTWIDTH] IN BLOOD BY AUTOMATED COUNT: 14.4 % (ref 10–15)
ERYTHROCYTE [SEDIMENTATION RATE] IN BLOOD BY WESTERGREN METHOD: 9 MM/H (ref 0–20)
GFR SERPL CREATININE-BSD FRML MDRD: 66 ML/MIN/1.7M2
GLUCOSE BLDC GLUCOMTR-MCNC: 175 MG/DL (ref 70–99)
GLUCOSE SERPL-MCNC: 161 MG/DL (ref 70–99)
HCT VFR BLD AUTO: 38.1 % (ref 40–53)
HCT VFR BLD AUTO: 38.4 % (ref 40–53)
HGB BLD-MCNC: 13.5 G/DL (ref 13.3–17.7)
HGB BLD-MCNC: 14 G/DL (ref 13.3–17.7)
IMM GRANULOCYTES # BLD: 0.1 10E9/L (ref 0–0.4)
IMM GRANULOCYTES NFR BLD: 0.5 %
LDH SERPL L TO P-CCNC: 465 U/L (ref 85–227)
LYMPHOCYTES # BLD AUTO: 0.9 10E9/L (ref 0.8–5.3)
LYMPHOCYTES # BLD AUTO: 1.7 10E9/L (ref 0.8–5.3)
LYMPHOCYTES NFR BLD AUTO: 12.2 %
LYMPHOCYTES NFR BLD AUTO: 9.5 %
MCH RBC QN AUTO: 33.5 PG (ref 26.5–33)
MCH RBC QN AUTO: 34.6 PG (ref 26.5–33)
MCHC RBC AUTO-ENTMCNC: 35.4 G/DL (ref 31.5–36.5)
MCHC RBC AUTO-ENTMCNC: 36.5 G/DL (ref 31.5–36.5)
MCV RBC AUTO: 95 FL (ref 78–100)
MCV RBC AUTO: 95 FL (ref 78–100)
MONOCYTES # BLD AUTO: 1.1 10E9/L (ref 0–1.3)
MONOCYTES # BLD AUTO: 1.3 10E9/L (ref 0–1.3)
MONOCYTES NFR BLD AUTO: 11.6 %
MONOCYTES NFR BLD AUTO: 9.7 %
NEUTROPHILS # BLD AUTO: 10.4 10E9/L (ref 1.6–8.3)
NEUTROPHILS # BLD AUTO: 7 10E9/L (ref 1.6–8.3)
NEUTROPHILS NFR BLD AUTO: 76.2 %
NEUTROPHILS NFR BLD AUTO: 76.8 %
NRBC # BLD AUTO: 0 10*3/UL
NRBC BLD AUTO-RTO: 0 /100
PLATELET # BLD AUTO: 194 10E9/L (ref 150–450)
PLATELET # BLD AUTO: 271 10E9/L (ref 150–450)
POTASSIUM SERPL-SCNC: 3.4 MMOL/L (ref 3.4–5.3)
PROT SERPL-MCNC: 6.7 G/DL (ref 6.8–8.8)
RBC # BLD AUTO: 4.03 10E12/L (ref 4.4–5.9)
RBC # BLD AUTO: 4.05 10E12/L (ref 4.4–5.9)
RETICS # AUTO: 179.1 10E9/L (ref 25–95)
RETICS/RBC NFR AUTO: 4.5 % (ref 0.5–2)
SODIUM SERPL-SCNC: 132 MMOL/L (ref 133–144)
WBC # BLD AUTO: 13.7 10E9/L (ref 4–11)
WBC # BLD AUTO: 9.2 10E9/L (ref 4–11)

## 2018-08-28 PROCEDURE — 85025 COMPLETE CBC W/AUTO DIFF WBC: CPT | Performed by: FAMILY MEDICINE

## 2018-08-28 PROCEDURE — 00000402 ZZHCL STATISTIC TOTAL PROTEIN: Performed by: FAMILY MEDICINE

## 2018-08-28 PROCEDURE — 86140 C-REACTIVE PROTEIN: CPT | Performed by: FAMILY MEDICINE

## 2018-08-28 PROCEDURE — 99284 EMERGENCY DEPT VISIT MOD MDM: CPT | Mod: Z6 | Performed by: FAMILY MEDICINE

## 2018-08-28 PROCEDURE — 99285 EMERGENCY DEPT VISIT HI MDM: CPT | Mod: 25 | Performed by: FAMILY MEDICINE

## 2018-08-28 PROCEDURE — 36415 COLL VENOUS BLD VENIPUNCTURE: CPT | Performed by: FAMILY MEDICINE

## 2018-08-28 PROCEDURE — 25000128 H RX IP 250 OP 636: Performed by: FAMILY MEDICINE

## 2018-08-28 PROCEDURE — 99214 OFFICE O/P EST MOD 30 MIN: CPT | Performed by: FAMILY MEDICINE

## 2018-08-28 PROCEDURE — 85045 AUTOMATED RETICULOCYTE COUNT: CPT | Performed by: FAMILY MEDICINE

## 2018-08-28 PROCEDURE — 72131 CT LUMBAR SPINE W/O DYE: CPT

## 2018-08-28 PROCEDURE — 83615 LACTATE (LD) (LDH) ENZYME: CPT | Performed by: FAMILY MEDICINE

## 2018-08-28 PROCEDURE — 85652 RBC SED RATE AUTOMATED: CPT | Performed by: FAMILY MEDICINE

## 2018-08-28 PROCEDURE — 96375 TX/PRO/DX INJ NEW DRUG ADDON: CPT | Performed by: FAMILY MEDICINE

## 2018-08-28 PROCEDURE — 84165 PROTEIN E-PHORESIS SERUM: CPT | Performed by: FAMILY MEDICINE

## 2018-08-28 PROCEDURE — 00000146 ZZHCL STATISTIC GLUCOSE BY METER IP

## 2018-08-28 PROCEDURE — 85060 BLOOD SMEAR INTERPRETATION: CPT | Performed by: FAMILY MEDICINE

## 2018-08-28 PROCEDURE — 96374 THER/PROPH/DIAG INJ IV PUSH: CPT | Performed by: FAMILY MEDICINE

## 2018-08-28 PROCEDURE — 80053 COMPREHEN METABOLIC PANEL: CPT | Performed by: FAMILY MEDICINE

## 2018-08-28 PROCEDURE — 84443 ASSAY THYROID STIM HORMONE: CPT | Performed by: FAMILY MEDICINE

## 2018-08-28 RX ORDER — HYDROMORPHONE HYDROCHLORIDE 1 MG/ML
0.5 INJECTION, SOLUTION INTRAMUSCULAR; INTRAVENOUS; SUBCUTANEOUS ONCE
Status: COMPLETED | OUTPATIENT
Start: 2018-08-28 | End: 2018-08-28

## 2018-08-28 RX ORDER — LORAZEPAM 2 MG/ML
0.5 INJECTION INTRAMUSCULAR ONCE
Status: DISCONTINUED | OUTPATIENT
Start: 2018-08-28 | End: 2018-08-28

## 2018-08-28 RX ORDER — LORAZEPAM 2 MG/ML
1 INJECTION INTRAMUSCULAR ONCE
Status: COMPLETED | OUTPATIENT
Start: 2018-08-28 | End: 2018-08-28

## 2018-08-28 RX ORDER — KETOROLAC TROMETHAMINE 30 MG/ML
30 INJECTION, SOLUTION INTRAMUSCULAR; INTRAVENOUS ONCE
Status: COMPLETED | OUTPATIENT
Start: 2018-08-28 | End: 2018-08-28

## 2018-08-28 RX ADMIN — Medication 0.5 MG: at 21:01

## 2018-08-28 RX ADMIN — Medication 0.5 MG: at 22:34

## 2018-08-28 RX ADMIN — KETOROLAC TROMETHAMINE 30 MG: 30 INJECTION, SOLUTION INTRAMUSCULAR at 20:14

## 2018-08-28 RX ADMIN — LORAZEPAM 1 MG: 2 INJECTION INTRAMUSCULAR; INTRAVENOUS at 23:26

## 2018-08-28 ASSESSMENT — ENCOUNTER SYMPTOMS
WEAKNESS: 1
BACK PAIN: 1

## 2018-08-28 NOTE — ED AVS SNAPSHOT
[facility], [place], Emergency Department    [address]    [facility] [address]    Phone:  [phone]    Fax:  [phone]                                       Parmjit Hernández   MRN: [id number]    Department:  [facility], Emergency Department   Date of Visit:  8/28/2018           After Visit Summary Signature Page     I have received my discharge instructions, and my questions have been answered. I have discussed any challenges I see with this plan with the nurse or doctor.    ..........................................................................................................................................  Patient/Patient Representative Signature      ..........................................................................................................................................  Patient Representative Print Name and Relationship to Patient    ..................................................               ................................................  Date                                            Time    ..........................................................................................................................................  Reviewed by Signature/Title    ...................................................              ..............................................  Date                                                            Time          22EPIC Rev 08/18

## 2018-08-28 NOTE — PROGRESS NOTES
SUBJECTIVE:   Parmjit Hernández is a 63 year old male who presents to clinic today for the following health issues:  fatugue, anemia of chronic disease. recent MRI shows nonspecific marrow hetergeneity /hyeperintensity on stir images, suggested work up of multiple myloma. he had n bone marrow biospy 2016    MRI of back, Anemia and multiple myeloma      Duration: unknown    Description (location/character/radiation): back pain    Intensity:  severe    Accompanying signs and symptoms:     History (similar episodes/previous evaluation): will see spine surgeon    Precipitating or alleviating factors: None    Therapies tried and outcome: None   Seen in emergency department- 8/21  See in pain clinic 8/22- due to   Is sent to get Multiple Myeloma lab test, based on recent MRI- that basically shows chronic changes of advanced degenerative joint disease and herinated disc and hyperintensity of bone marrow    We reviewed his previous records and found a negative work up for MM -Bone marrow biopsy - 6/2017-  Has plans to see Dr guy      He is at pain clinic management.  Most recent OV-[facility] see 8/22/18  Currently on lyrica 75 mg once daily three times daily  Percocet 10/4/325 mg 1 tab twice daily   PHYSICAL THERAPY & pain psychology implant eval- spinal cord simulator  Return office visit zelda hernandez    PROBLEMS TO ADD ON...    Problem list and histories reviewed & adjusted, as indicated.  Additional history: as documented    Patient Active Problem List   Diagnosis     Obstructive sleep apnea     HYPERLIPIDEMIA LDL GOAL <100     Hypertension goal BP (blood pressure) < 140/90     Advanced directives, counseling/discussion     Migraine     History of diverticulitis     MENTAL HEALTH     Marianne (H)     Bipolar I disorder (H)     Chronic abdominal pain     Anxiety     Other amyloidosis     Insomnia due to medical condition     Narcotic dependence (H)     Obstructive sleep apnea syndrome     Type 2  diabetes mellitus without complication, without long-term current use of insulin (H)     Restless legs syndrome (RLS)     Type 2 diabetes mellitus with other specified complication (H)     Peripheral edema     Morbid obesity (H)     Past Surgical History:   Procedure Laterality Date     BONE MARROW BIOPSY, BONE SPECIMEN, NEEDLE/TROCAR N/A 6/8/2016    Procedure: BIOPSY BONE MARROW;  Surgeon: Nathan Agrawal MD;  Location:  GI     C NONSPECIFIC PROCEDURE  94    Cholecystectomy     C NONSPECIFIC PROCEDURE  2000    repair deviated septum     CHOLECYSTECTOMY  1995    lap qian     COLONOSCOPY  2012    hx polyps     ESOPHAGOSCOPY, GASTROSCOPY, DUODENOSCOPY (EGD), COMBINED N/A 5/29/2015    Procedure: COMBINED ESOPHAGOSCOPY, GASTROSCOPY, DUODENOSCOPY (EGD), BIOPSY SINGLE OR MULTIPLE;  Surgeon: John Jacob MD;  Location:  GI     HERNIA REPAIR, UMBILICAL  2006       Social History   Substance Use Topics     Smoking status: Never Smoker     Smokeless tobacco: Never Used     Alcohol use No     Family History   Problem Relation Age of Onset     Cerebrovascular Disease Mother      C.A.D. Mother      Hypertension Mother      Alcohol/Drug Mother      Arthritis Mother      Cerebrovascular Disease Maternal Grandmother      C.A.D. Maternal Grandmother      Alcohol/Drug Maternal Grandmother      C.A.D. Father      HEART DISEASE Father      Hypertension Father      Alcohol/Drug Father      Allergies Father      Circulatory Father      Depression Father      Respiratory Father      Asthma Father      Alcohol/Drug Paternal Grandfather      Cerebrovascular Disease Paternal Grandfather      Depression Son      Psychotic Disorder Son      anxiety disorder     Depression Daughter      Cerebrovascular Disease Maternal Grandfather      Cerebrovascular Disease Paternal Grandmother      Diabetes Brother      Allergies Sister      Depression Sister      Gynecology Sister      Coronary Artery Disease No family hx of       "Hyperlipidemia No family hx of      Breast Cancer No family hx of      Colon Cancer No family hx of      Prostate Cancer No family hx of      Other Cancer No family hx of      Anxiety Disorder No family hx of      Mental Illness No family hx of      Substance Abuse No family hx of      Anesthesia Reaction No family hx of      Osteoporosis No family hx of      Genetic Disorder No family hx of      Thyroid Disease No family hx of      Obesity No family hx of      Unknown/Adopted No family hx of            Reviewed and updated as needed this visit by clinical staff  Tobacco  Allergies  Meds       Reviewed and updated as needed this visit by Provider         ROS:  Constitutional, HEENT, cardiovascular, pulmonary, gi and gu systems are negative, except as otherwise noted.    OBJECTIVE:     /80  Pulse 102  Temp 98.2  F (36.8  C) (Oral)  Resp 16  Ht 5' 7\" (1.702 m)  Wt 228 lb (103.4 kg)  SpO2 93%  BMI 35.71 kg/m2  Body mass index is 35.71 kg/(m^2).  GENERAL: healthy, alert and no distress  HENT: ear canals and TM's normal, nose and mouth without ulcers or lesions  NECK: no adenopathy, no asymmetry, masses, or scars and thyroid normal to palpation  RESP: lungs clear to auscultation - no rales, rhonchi or wheezes  CV: regular rate and rhythm, normal S1 S2, no S3 or S4, no murmur, click or rub, no peripheral edema and peripheral pulses strong  ABDOMEN: soft, nontender, no hepatosplenomegaly, no masses and bowel sounds normal  MS: no gross musculoskeletal defects noted, no edema  PSYCH: mentation appears normal, affect normal/bright    Diagnostic Test Results:    ASSESSMENT/PLAN:   1. Anemia in other chronic diseases classified elsewhere  Long discussion with patient  He had work up for Multiple Myeloma in 2016- it was negative  Anemia seems chronic from Diabetes most likely - will repeat all labs,but nothing invasive as bone marrow- he thought he would get one in clinic   Also encouraged to see hematologist- " he reports he already has plans to see one.  Will inform him on my chart about labs  Current fatigue mutifactorial  Currently on lyrica and also percocet  At the end of the visit- he came back from  Lab and asked for Tylenol # 3  Explained to patient - to ask current prescribers at pain clinic- he reports he can not as he is under contract with them- and can not refill percocet till septmebr 3 rd and ran out of the current prescription faster for on going chronic abdomen pain  His pain management- is complex  We went over the detail follow up given to him on 8/22 by the pain clinic  He voiced understanding and also instructed to keep pain management clinic up to date on his need for pain medications- as they are controlled substance and if he seeks from outside- that might be the breech of his contract  He was not gibven any further and was aggreable with the plan    - Protein electrophoresis  - Blood Morphology Pathologist Review  - CBC with platelets differential  - Reticulocyte Count  - Comprehensive metabolic panel  - Lactate Dehydrogenase  - ESR: Erythrocyte sedimentation rate  - CRP, inflammation    2. Abnormal TSH    - TSH with free T4 reflex    3. Morbid obesity (H)  Plan:counseled about healthy eating habits.  Barriers include:never been in a state of health to be able to cook  Harder to watch diet, as always eating out on a budget    4. Hypertension goal BP (blood pressure) < 140/90  Initial bp high  Recheck ok  Plan: continue with current Blood pressure medications      5. Bipolar I disorder (H)  Plan: no medications changes    6. Type 2 diabetes mellitus without complication, without long-term current use of insulin (H)  Controlled          Maribeth Ardon MD  [facility]

## 2018-08-28 NOTE — ED AVS SNAPSHOT
[facility], Emergency Department    [address]    [place] [address]    Phone:  [phone]    Fax:  [phone]                                       Parmjit Hernández   MRN: [id number]    Department:  [facility], Emergency Department   Date of Visit:  8/28/2018           Patient Information     Date Of Birth          1955        Your diagnoses for this visit were:     Protrusion of lumbar intervertebral disc     Chronic midline low back pain without sciatica        You were seen by Raphael Jade MD.      Follow-up Information     Follow up with Maribeth Ardon MD.    Specialty:  Family Practice    Why:  As needed    Contact information:    [address] [facility] [address] [phone]          Discharge Instructions       Discharge to home with prescription for 8 Percocet tablets with plans to follow-up with your primary orthopedic surgeon as discussed for possible steroid injection into your lumbar spine for disc herniation.    24 Hour Appointment Hotline       To make an appointment at any [facility] clinic, call [phone] ([phone]). If you don't have a family doctor or clinic, we will help you find one. [facility] clinics are conveniently located to serve the needs of you and your family.             Review of your medicines      START taking        Dose / Directions Last dose taken    oxyCODONE-acetaminophen 5-325 MG per tablet   Commonly known as:  PERCOCET   Dose:  1-2 tablet   Quantity:  8 tablet        Take 1-2 tablets by mouth every 6 hours as needed for pain   Refills:  0          Our records show that you are taking the medicines listed below. If these are incorrect, please call your family doctor or clinic.        Dose / Directions Last dose taken    acetaminophen 325 MG tablet   Commonly known as:  TYLENOL   Dose:  325 mg   Quantity:  10 tablet        Take 1 tablet (325 mg) by mouth daily as needed for mild pain   Refills:  0         acetaminophen-codeine 300-30 MG per tablet   Commonly known as:  TYLENOL #3   Dose:  1 tablet   Quantity:  10 tablet        Take 1 tablet by mouth daily as needed for severe pain maximum 2 tablet(s) per day   Refills:  0        aspirin 81 MG tablet   Commonly known as:  ASPIRIN LOW DOSE   Dose:  81 mg   Quantity:  30 tablet        Take 1 tablet (81 mg) by mouth daily   Refills:  3        atorvastatin 10 MG tablet   Commonly known as:  LIPITOR   Quantity:  210 tablet        TAKE 1 TABLET BY MOUTH EVERY DAY   Refills:  0        CYMBALTA PO   Dose:  20 mg        Take 20 mg by mouth daily   Refills:  0        divalproex sodium delayed-release 500 MG DR tablet   Commonly known as:  DEPAKOTE   Dose:  1000 mg   Quantity:  10 tablet        Take 2 tablets (1,000 mg) by mouth daily   Refills:  0        doxepin 25 MG capsule   Commonly known as:  SINEquan   Dose:  50 mg   Quantity:  10 capsule        Take 2 capsules (50 mg) by mouth At Bedtime   Refills:  0        gabapentin 800 MG tablet   Commonly known as:  NEURONTIN   Dose:  800 mg   Quantity:  90 tablet        Take 1 tablet (800 mg) by mouth 3 times daily   Refills:  11        ketoconazole 2 % shampoo   Commonly known as:  NIZORAL   Quantity:  120 mL        APPLY TO THE AFFECTED AREA AND WASH OFF AFTER 5 MINUTES   Refills:  2        lidocaine 5 % Patch   Commonly known as:  LIDODERM   Quantity:  30 patch        Apply up to 3 patches to painful area at once for up to 12 h within a 24 h period.  Remove after 12 hours.   Refills:  0        lisinopril 20 MG tablet   Commonly known as:  PRINIVIL/ZESTRIL   Dose:  20 mg   Quantity:  90 tablet        Take 1 tablet (20 mg) by mouth daily   Refills:  3        LYRICA 50 MG capsule   Dose:  75 mg   Generic drug:  pregabalin        Take 75 mg by mouth 3 times daily Rx by [facility]   Refills:  0        metFORMIN 500 MG 24 hr tablet   Commonly known as:  GLUCOPHAGE-XR   Dose:  1000 mg   Quantity:  360 tablet        Take 2 tablets  (1,000 mg) by mouth 2 times daily (with meals)   Refills:  1        metoprolol succinate 25 MG 24 hr tablet   Commonly known as:  TOPROL-XL   Dose:  25 mg   Quantity:  90 tablet        Take 1 tablet (25 mg) by mouth daily   Refills:  1        OLANZapine 7.5 MG tablet   Commonly known as:  zyPREXA   Dose:  7.5 mg   Quantity:  5 tablet        Take 1 tablet (7.5 mg) by mouth At Bedtime   Refills:  0        * order for DME   Quantity:  1 Device        Equipment being ordered: CPAP   Refills:  0        * order for DME   Quantity:  2 Box        Equipment being ordered: sharla hose   Refills:  1        OXYCODONE HCL PO   Dose:  7.5 mg        Take 7.5 mg by mouth 2 times daily   Refills:  0        pantoprazole 40 MG EC tablet   Commonly known as:  PROTONIX   Dose:  40 mg   Quantity:  30 tablet        Take 1 tablet (40 mg) by mouth daily Take 30-60 minutes before a meal.   Refills:  1        potassium chloride SA 10 MEQ CR tablet   Commonly known as:  K-DUR/KLOR-CON M        Refills:  1        ranitidine 150 MG tablet   Commonly known as:  ZANTAC   Dose:  150 mg   Quantity:  60 tablet        Take 1 tablet (150 mg) by mouth 2 times daily   Refills:  1        SUMAtriptan 5 MG/ACT nasal spray   Commonly known as:  IMITREX   Quantity:  1 each        USE ONE TO TWO SPRAYS IN NOSTRIL AS NEEDED FOR MIGRANE. MAY REPEAT IN 2 HOURS, MAX 8 SPRAYS IN 24 HOURS.   Refills:  3        * Notice:  This list has 2 medication(s) that are the same as other medications prescribed for you. Read the directions carefully, and ask your doctor or other care provider to review them with you.            Information about OPIOIDS     PRESCRIPTION OPIOIDS: WHAT YOU NEED TO KNOW   We gave you an opioid (narcotic) pain medicine. It is important to manage your pain, but opioids are not always the best choice. You should first try all the other options your care team gave you. Take this medicine for as short a time (and as few doses) as possible.    Some  activities can increase your pain, such as bandage changes or therapy sessions. It may help to take your pain medicine 30 to 60 minutes before these activities. Reduce your stress by getting enough sleep, working on hobbies you enjoy and practicing relaxation or meditation. Talk to your care team about ways to manage your pain beyond prescription opioids.    These medicines have risks:    DO NOT drive when on new or higher doses of pain medicine. These medicines can affect your alertness and reaction times, and you could be arrested for driving under the influence (DUI). If you need to use opioids long-term, talk to your care team about driving.    DO NOT operate heavy machinery    DO NOT do any other dangerous activities while taking these medicines.    DO NOT drink any alcohol while taking these medicines.     If the opioid prescribed includes acetaminophen, DO NOT take with any other medicines that contain acetaminophen. Read all labels carefully. Look for the word  acetaminophen  or  Tylenol.  Ask your pharmacist if you have questions or are unsure.    You can get addicted to pain medicines, especially if you have a history of addiction (chemical, alcohol or substance dependence). Talk to your care team about ways to reduce this risk.    All opioids tend to cause constipation. Drink plenty of water and eat foods that have a lot of fiber, such as fruits, vegetables, prune juice, apple juice and high-fiber cereal. Take a laxative (Miralax, milk of magnesia, Colace, Senna) if you don t move your bowels at least every other day. Other side effects include upset stomach, sleepiness, dizziness, throwing up, tolerance (needing more of the medicine to have the same effect), physical dependence and slowed breathing.    Store your pills in a secure place, locked if possible. We will not replace any lost or stolen medicine. If you don t finish your medicine, please throw away (dispose) as directed by your pharmacist. The  [place] [employer] has more information about safe disposal: https://www.pca.state.[place]/living-green/managing-unwanted-medications        Prescriptions were sent or printed at these locations (1 Prescription)                   Other Prescriptions                Printed at Department/Unit printer (1 of 1)         oxyCODONE-acetaminophen (PERCOCET) 5-325 MG per tablet                Procedures and tests performed during your visit     CBC with platelets differential    Comprehensive metabolic panel    Glucose by meter    Glucose monitor nursing POCT    Lumbar spine CT w/o contrast    Lumbar spine MRI w/o contrast    Saline Lock IV      Orders Needing Specimen Collection     None      Pending Results     Date and Time Order Name Status Description    8/28/2018 2227 Lumbar spine MRI w/o contrast In process     8/28/2018 1252 TSH WITH FREE T4 REFLEX In process     8/28/2018 1251 COMPREHENSIVE METABOLIC PANEL In process     8/28/2018 1251 BLOOD MORPHOLOGY PATHOLOGIST REVIEW In process     8/28/2018 1251 PROTEIN ELECTROPHORESIS In process             Pending Culture Results     No orders found for last 3 day(s).            Pending Results Instructions     If you had any lab results that were not finalized at the time of your Discharge, you can call the ED Lab Result RN at [phone]. You will be contacted by this team for any positive Lab results or changes in treatment. The nurses are available 7 days a week from 10A to 6:30P.  You can leave a message 24 hours per day and they will return your call.        Thank you for choosing [facility]       Thank you for choosing [facility] for your care. Our goal is always to provide you with excellent care. Hearing back from our patients is one way we can continue to improve our services. Please take a few minutes to complete the written survey that you may receive in the mail after you visit with us. Thank you!        [facility] Information     [employer] gives you secure  access to your electronic health record. If you see a primary care provider, you can also send messages to your care team and make appointments. If you have questions, please call your primary care clinic.  If you do not have a primary care provider, please call [phone] and they will assist you.        Care EveryWhere ID     This is your Care EveryWhere ID. This could be used by other organizations to access your [facility] medical records  [id number]        Equal Access to Services     JUSTYNA FRIEDMAN : Hadii freeman alleno Soashley, wagilsonda lualeidaadaha, qasyedata kaalmada adeevgeny, glenis williamson. So [facility] [phone].    ATENCIÓN: Si habla español, tiene a agudelo disposición servicios gratuitos de asistencia lingüística. Llame al [phone].    We comply with applicable federal civil rights laws and [place] laws. We do not discriminate on the basis of race, color, national origin, age, disability, sex, sexual orientation, or gender identity.            After Visit Summary       This is your record. Keep this with you and show to your community pharmacist(s) and doctor(s) at your next visit.

## 2018-08-28 NOTE — NURSING NOTE
"Chief Complaint   Patient presents with     Results     MRI at [facility]     initial BP (!) 133/91  Pulse 102  Temp 98.2  F (36.8  C) (Oral)  Resp 16  Ht 5' 7\" (1.702 m)  Wt 228 lb (103.4 kg)  SpO2 93%  BMI 35.71 kg/m2 Estimated body mass index is 35.71 kg/(m^2) as calculated from the following:    Height as of this encounter: 5' 7\" (1.702 m).    Weight as of this encounter: 228 lb (103.4 kg).  BP completed using cuff size: large.  L  arm      Health Maintenance that is potentially due pending provider review:  NONE    n/a    Sung Car ma  "

## 2018-08-28 NOTE — MR AVS SNAPSHOT
After Visit Summary   8/28/2018    Parmjit Hernández    MRN: [id number]           Patient Information     Date Of Birth          1955        Visit Information        Provider Department      8/28/2018 12:00 PM Maribeth Ardon MD [facility]        Today's Diagnoses     Anemia in other chronic diseases classified elsewhere    -  1    Abnormal TSH        Morbid obesity (H)        Hypertension goal BP (blood pressure) < 140/90        Bipolar I disorder (H)        Type 2 diabetes mellitus without complication, without long-term current use of insulin (H)           Follow-ups after your visit        Who to contact     If you have questions or need follow up information about today's clinic visit or your schedule please contact [facility] directly at [phone].  Normal or non-critical lab and imaging results will be communicated to you by MyChart, letter or phone within 4 business days after the clinic has received the results. If you do not hear from us within 7 days, please contact the clinic through Teliportmehart or phone. If you have a critical or abnormal lab result, we will notify you by phone as soon as possible.  Submit refill requests through Changba or call your pharmacy and they will forward the refill request to us. Please allow 3 business days for your refill to be completed.          Additional Information About Your Visit        MyChart Information     Changba gives you secure access to your electronic health record. If you see a primary care provider, you can also send messages to your care team and make appointments. If you have questions, please call your primary care clinic.  If you do not have a primary care provider, please call [phone] and they will assist you.        Care EveryWhere ID     This is your Care EveryWhere ID. This could be used by other organizations to access your [place] medical records  [id number]        Your Vitals Were     Pulse  "Temperature Respirations Height Pulse Oximetry BMI (Body Mass Index)    102 98.2  F (36.8  C) (Oral) 16 5' 7\" (1.702 m) 93% 35.71 kg/m2       Blood Pressure from Last 3 Encounters:   08/29/18 (!) 134/107   08/28/18 130/80   05/16/18 130/83    Weight from Last 3 Encounters:   08/28/18 228 lb (103.4 kg)   08/28/18 228 lb (103.4 kg)   05/16/18 247 lb 11.2 oz (112.4 kg)              We Performed the Following     Blood Morphology Pathologist Review     CBC with platelets differential     Comprehensive metabolic panel     CRP, inflammation     ESR: Erythrocyte sedimentation rate     Lactate Dehydrogenase     Protein electrophoresis     Reticulocyte Count     TSH with free T4 reflex        Primary Care Provider Office Phone # Fax #    Maribeth Ardon [phone] [address] [facility] [address]        Equal Access to Services     [facility] : Hadii freeman Blevins, waaxda luwalter, qaybta kaalmada joselyn, glenis mckenzie . So [facility] [phone].    ATENCIÓN: Si habla español, tiene a agudeol disposición servicios gratuitos de asistencia lingüística. Llame al [phone].    We comply with applicable federal civil rights laws and [place] laws. We do not discriminate on the basis of race, color, national origin, age, disability, sex, sexual orientation, or gender identity.            Thank you!     Thank you for choosing [facility]  for your care. Our goal is always to provide you with excellent care. Hearing back from our patients is one way we can continue to improve our services. Please take a few minutes to complete the written survey that you may receive in the mail after your visit with us. Thank you!             Your Updated Medication List - Protect others around you: Learn how to safely use, store and throw away your medicines at www.disposemymeds.org.          This list is accurate as of 8/28/18 11:59 PM.  Always use your most " recent med list.                   Brand Name Dispense Instructions for use Diagnosis    acetaminophen 325 MG tablet    TYLENOL    10 tablet    Take 1 tablet (325 mg) by mouth daily as needed for mild pain    Other chronic gastritis without hemorrhage       acetaminophen-codeine 300-30 MG per tablet    TYLENOL #3    10 tablet    Take 1 tablet by mouth daily as needed for severe pain maximum 2 tablet(s) per day    Other chronic gastritis without hemorrhage       aspirin 81 MG tablet    ASPIRIN LOW DOSE    30 tablet    Take 1 tablet (81 mg) by mouth daily    Hyperlipidemia LDL goal <100, Hypertension goal BP (blood pressure) < 140/90       atorvastatin 10 MG tablet    LIPITOR    210 tablet    TAKE 1 TABLET BY MOUTH EVERY DAY    Hyperlipidemia LDL goal <100       CYMBALTA PO      Take 20 mg by mouth daily        divalproex sodium delayed-release 500 MG DR tablet    DEPAKOTE    10 tablet    Take 2 tablets (1,000 mg) by mouth daily    Hyperlipidemia LDL goal <100       doxepin 25 MG capsule    SINEquan    10 capsule    Take 2 capsules (50 mg) by mouth At Bedtime    Hyperlipidemia LDL goal <100       gabapentin 800 MG tablet    NEURONTIN    90 tablet    Take 1 tablet (800 mg) by mouth 3 times daily    Chronic abdominal pain       ketoconazole 2 % shampoo    NIZORAL    120 mL    APPLY TO THE AFFECTED AREA AND WASH OFF AFTER 5 MINUTES    Tinea corporis       lidocaine 5 % Patch    LIDODERM    30 patch    Apply up to 3 patches to painful area at once for up to 12 h within a 24 h period.  Remove after 12 hours.        lisinopril 20 MG tablet    PRINIVIL/ZESTRIL    90 tablet    Take 1 tablet (20 mg) by mouth daily    Hypertension goal BP (blood pressure) < 140/90       LYRICA 50 MG capsule   Generic drug:  pregabalin      Take 75 mg by mouth 3 times daily Rx by [facility] clinic    Type 2 diabetes mellitus without complication, without long-term current use of insulin (H)       metFORMIN 500 MG 24 hr tablet    GLUCOPHAGE-XR    360  tablet    Take 2 tablets (1,000 mg) by mouth 2 times daily (with meals)    Type 2 diabetes mellitus without complication, without long-term current use of insulin (H)       metoprolol succinate 25 MG 24 hr tablet    TOPROL-XL    90 tablet    Take 1 tablet (25 mg) by mouth daily    Hypertension goal BP (blood pressure) < 140/90       OLANZapine 7.5 MG tablet    zyPREXA    5 tablet    Take 1 tablet (7.5 mg) by mouth At Bedtime    Hyperlipidemia LDL goal <100, Generalized edema, Chronic abdominal pain       * order for DME     1 Device    Equipment being ordered: CPAP    Obstructive sleep apnea       * order for DME     2 Box    Equipment being ordered: sharla hosmena    Peripheral edema       OXYCODONE HCL PO      Take 7.5 mg by mouth 2 times daily        oxyCODONE-acetaminophen 5-325 MG per tablet    PERCOCET    8 tablet    Take 1-2 tablets by mouth every 6 hours as needed for pain        pantoprazole 40 MG EC tablet    PROTONIX    30 tablet    Take 1 tablet (40 mg) by mouth daily Take 30-60 minutes before a meal.    Other chronic gastritis without hemorrhage       potassium chloride SA 10 MEQ CR tablet    K-DUR/KLOR-CON M      Type 2 diabetes mellitus without complication, without long-term current use of insulin (H)       ranitidine 150 MG tablet    ZANTAC    60 tablet    Take 1 tablet (150 mg) by mouth 2 times daily    Other chronic gastritis without hemorrhage       SUMAtriptan 5 MG/ACT nasal spray    IMITREX    1 each    USE ONE TO TWO SPRAYS IN NOSTRIL AS NEEDED FOR MIGRANE. MAY REPEAT IN 2 HOURS, MAX 8 SPRAYS IN 24 HOURS.    Abdominal migraine, intractable       * Notice:  This list has 2 medication(s) that are the same as other medications prescribed for you. Read the directions carefully, and ask your doctor or other care provider to review them with you.

## 2018-08-29 ENCOUNTER — APPOINTMENT (OUTPATIENT)
Dept: MRI IMAGING | Facility: CLINIC | Age: 63
End: 2018-08-29
Attending: FAMILY MEDICINE
Payer: MEDICARE

## 2018-08-29 VITALS
RESPIRATION RATE: 14 BRPM | SYSTOLIC BLOOD PRESSURE: 134 MMHG | OXYGEN SATURATION: 95 % | HEART RATE: 99 BPM | TEMPERATURE: 97.7 F | BODY MASS INDEX: 35.71 KG/M2 | DIASTOLIC BLOOD PRESSURE: 107 MMHG | WEIGHT: 228 LBS

## 2018-08-29 LAB
ALBUMIN SERPL ELPH-MCNC: 4.3 G/DL (ref 3.7–5.1)
ALBUMIN SERPL-MCNC: 4 G/DL (ref 3.4–5)
ALP SERPL-CCNC: 75 U/L (ref 40–150)
ALPHA1 GLOB SERPL ELPH-MCNC: 0.4 G/DL (ref 0.2–0.4)
ALPHA2 GLOB SERPL ELPH-MCNC: 0.8 G/DL (ref 0.5–0.9)
ALT SERPL W P-5'-P-CCNC: 28 U/L (ref 0–70)
ANION GAP SERPL CALCULATED.3IONS-SCNC: 16 MMOL/L (ref 3–14)
AST SERPL W P-5'-P-CCNC: 15 U/L (ref 0–45)
B-GLOBULIN SERPL ELPH-MCNC: 0.7 G/DL (ref 0.6–1)
BILIRUB SERPL-MCNC: 1.6 MG/DL (ref 0.2–1.3)
BUN SERPL-MCNC: 22 MG/DL (ref 7–30)
CALCIUM SERPL-MCNC: 8.4 MG/DL (ref 8.5–10.1)
CHLORIDE SERPL-SCNC: 96 MMOL/L (ref 94–109)
CO2 SERPL-SCNC: 17 MMOL/L (ref 20–32)
CREAT SERPL-MCNC: 0.9 MG/DL (ref 0.66–1.25)
GAMMA GLOB SERPL ELPH-MCNC: 0.5 G/DL (ref 0.7–1.6)
GFR SERPL CREATININE-BSD FRML MDRD: 86 ML/MIN/1.7M2
GLUCOSE SERPL-MCNC: 166 MG/DL (ref 70–99)
M PROTEIN SERPL ELPH-MCNC: 0 G/DL
POTASSIUM SERPL-SCNC: 3.7 MMOL/L (ref 3.4–5.3)
PROT PATTERN SERPL ELPH-IMP: ABNORMAL
PROT SERPL-MCNC: 6.7 G/DL (ref 6.8–8.8)
SODIUM SERPL-SCNC: 129 MMOL/L (ref 133–144)
TSH SERPL DL<=0.005 MIU/L-ACNC: 1.75 MU/L (ref 0.4–4)

## 2018-08-29 PROCEDURE — 72148 MRI LUMBAR SPINE W/O DYE: CPT

## 2018-08-29 RX ORDER — OXYCODONE AND ACETAMINOPHEN 5; 325 MG/1; MG/1
1-2 TABLET ORAL EVERY 6 HOURS PRN
Qty: 8 TABLET | Refills: 0 | Status: SHIPPED | OUTPATIENT
Start: 2018-08-29 | End: 2019-01-17

## 2018-08-29 NOTE — ED PROVIDER NOTES
History     Chief Complaint   Patient presents with     Back Pain     Pt has been having back pain for the past month, pain has increased over the past week. Had a MRI on 8/15 was refered to a spine surgeon, liliane in Oct     HPI  Parmjit Hernández is a 63-year-old male who presents to the Emergency Department due to back pain. Patient reports that he has had bilateral lower back pain for the last few months which has increased over the last week. He states that he had an MRI done on 8/15. He was then referred to a spine surgeon but notes that his appointment is in October. He also reports bilateral lower extremity weakness, stating that it is difficult to stand from a seated position. He has had one episode of bowel incontinence two nights ago.     Patient reports that he has chronic abdominal pain of unknown etiology and is managed at a pain clinic. He states that he is prescribed oxycodone for his pain but they have not addressed his lower back pain. Per chart review, patient was seen at [facility] ED today as well as [facility].      I have reviewed the Medications, Allergies, Past Medical and Surgical History, and Social History in the Epic system.    No current facility-administered medications for this encounter.      Current Outpatient Prescriptions   Medication     aspirin (ASPIRIN LOW DOSE) 81 MG tablet     atorvastatin (LIPITOR) 10 MG tablet     divalproex (DEPAKOTE) 500 MG EC tablet     lisinopril (PRINIVIL/ZESTRIL) 20 MG tablet     metFORMIN (GLUCOPHAGE-XR) 500 MG 24 hr tablet     OLANZapine (ZYPREXA) 7.5 MG tablet     OXYCODONE HCL PO     oxyCODONE-acetaminophen (PERCOCET) 5-325 MG per tablet     pregabalin (LYRICA) 50 MG capsule     acetaminophen (TYLENOL) 325 MG tablet     acetaminophen-codeine (TYLENOL #3) 300-30 MG per tablet     doxepin (SINEQUAN) 25 MG capsule     DULoxetine HCl (CYMBALTA PO)     gabapentin (NEURONTIN) 800 MG tablet     ketoconazole (NIZORAL) 2 % shampoo     lidocaine  (LIDODERM) 5 % Patch     metoprolol succinate (TOPROL-XL) 25 MG 24 hr tablet     order for DME     order for DME     pantoprazole (PROTONIX) 40 MG EC tablet     potassium chloride SA (K-DUR/KLOR-CON M) 10 MEQ CR tablet     ranitidine (ZANTAC) 150 MG tablet     SUMAtriptan (IMITREX) 5 MG/ACT nasal spray     Past Medical History:   Diagnosis Date     Allergic state      Amyloidosis (H)      Diabetes mellitus (H)     type 2     Headache(784.0)      Hypertension 2007     Myalgia and myositis, unspecified      Obsessive-compulsive disorders      Other acquired absence of organ 94     Other specified viral warts      Pain in the abdomen      Pure hypercholesterolemia        Past Surgical History:   Procedure Laterality Date     BONE MARROW BIOPSY, BONE SPECIMEN, NEEDLE/TROCAR N/A 6/8/2016    Procedure: BIOPSY BONE MARROW;  Surgeon: Nathan Agrawal MD;  Location:  GI     C NONSPECIFIC PROCEDURE  94    Cholecystectomy     C NONSPECIFIC PROCEDURE  2000    repair deviated septum     CHOLECYSTECTOMY  1995    lap qian     COLONOSCOPY  2012    hx polyps     ESOPHAGOSCOPY, GASTROSCOPY, DUODENOSCOPY (EGD), COMBINED N/A 5/29/2015    Procedure: COMBINED ESOPHAGOSCOPY, GASTROSCOPY, DUODENOSCOPY (EGD), BIOPSY SINGLE OR MULTIPLE;  Surgeon: John Jacob MD;  Location:  GI     HERNIA REPAIR, UMBILICAL  2006       Family History   Problem Relation Age of Onset     Cerebrovascular Disease Mother      C.A.D. Mother      Hypertension Mother      Alcohol/Drug Mother      Arthritis Mother      Cerebrovascular Disease Maternal Grandmother      C.A.D. Maternal Grandmother      Alcohol/Drug Maternal Grandmother      C.A.D. Father      HEART DISEASE Father      Hypertension Father      Alcohol/Drug Father      Allergies Father      Circulatory Father      Depression Father      Respiratory Father      Asthma Father      Alcohol/Drug Paternal Grandfather      Cerebrovascular Disease Paternal Grandfather      Depression Son       Psychotic Disorder Son      anxiety disorder     Depression Daughter      Cerebrovascular Disease Maternal Grandfather      Cerebrovascular Disease Paternal Grandmother      Diabetes Brother      Allergies Sister      Depression Sister      Gynecology Sister      Coronary Artery Disease No family hx of      Hyperlipidemia No family hx of      Breast Cancer No family hx of      Colon Cancer No family hx of      Prostate Cancer No family hx of      Other Cancer No family hx of      Anxiety Disorder No family hx of      Mental Illness No family hx of      Substance Abuse No family hx of      Anesthesia Reaction No family hx of      Osteoperosis No family hx of      Genetic Disorder No family hx of      Thyroid Disease No family hx of      Obesity No family hx of      Unknown/Adopted No family hx of        Social History   Substance Use Topics     Smoking status: Never Smoker     Smokeless tobacco: Never Used     Alcohol use No     Allergies   Allergen Reactions     Liraglutide Other (See Comments)     Sulfa Drugs Swelling     Pt has taken  Taken sulfa drugs orally without trouble. He had problems with Sulfa eye drops. Eye swelled up     Victoza      Increased migraine frequency and severity       Review of Systems   Musculoskeletal: Positive for back pain (bilateral lower - chronic).   Neurological: Positive for weakness (lower extremity).   All other systems reviewed and are negative.      Physical Exam   BP: 125/86  Pulse: 103  Temp: 97  F (36.1  C)  Resp: 16  Weight: 103.4 kg (228 lb)  SpO2: 91 %      Physical Exam   Constitutional: He is oriented to person, place, and time. No distress.   HENT:   Head: Atraumatic.   Mouth/Throat: Oropharynx is clear and moist. No oropharyngeal exudate.   Eyes: Pupils are equal, round, and reactive to light. No scleral icterus.   Cardiovascular: Normal heart sounds and intact distal pulses.    Pulmonary/Chest: Breath sounds normal. No respiratory distress.   Abdominal: Soft.  Bowel sounds are normal. There is no tenderness.   Genitourinary:   Genitourinary Comments: Patient's rectal tone not quite as intense as would be normal.  Patient is not currently incontinent of stool   Musculoskeletal: He exhibits no edema or tenderness.   Neurological: He is alert and oriented to person, place, and time. No cranial nerve deficit. Coordination normal.   Skin: Skin is warm. No rash noted. He is not diaphoretic.   Psychiatric: His mood appears anxious.       ED Course   7:41 PM  The patient was seen and examined by Raphael Jade MD in Room 6.   ED Course     Procedures       Critical Care time:  none         Results for orders placed or performed during the hospital encounter of 08/28/18   Lumbar spine CT w/o contrast    Narrative    CT OF THE LUMBAR SPINE WITHOUT CONTRAST  8/28/2018 10:05 PM     COMPARISON: Lumbar spine MRI 7/22/2008.    HISTORY: Pain, disc disease possible cauda equina.      TECHNIQUE: Axial images of the lumbar spine were acquired without  intravenous contrast. Multiplanar reformations were created from the  axial source images.        Impression    IMPRESSION:  There is normal alignment of the lumbar vertebrae.  Vertebral body heights of the lumbar spine are normal. There is no  evidence for fracture of the lumbar spine. There is severe  degenerative change in imposing endplates at the L4-L5 level. No other  significant degenerative changes noted. There is at least moderate  spinal canal stenosis at the L4-L5 level due to a presumed posterior  disc bulge/herniation. No other spinal canal narrowing is noted in the  lumbar spine. There is mild bilateral neural foraminal narrowing at  L4-L5 and L5-S1 levels. The lumbar neural foramina are otherwise  widely patent.      Radiation dose for this scan was reduced using automated exposure  control, adjustment of the mA and/or kV according to patient size, or  iterative reconstruction technique    AZUCENA LANDAVERDE MD   Lumbar spine  MRI w/o contrast    Narrative    MR LUMBAR SPINE WITHOUT CONTRAST August 29, 2018 1:37 AM     HISTORY: Back pain, poor rectal tone.    TECHNIQUE: Multiplanar multisequence images were obtained through the  lumbar spine without contrast.    COMPARISON: CT dated 8/28/2018 and MR dated 7/22/2008.    FINDINGS: Five lumbar type bodies are presumed. Posterior alignment is  normal. Disc space narrowing is present at L4-L5. Discogenic marrow  changes are also present L4-L5 and L1-L2. The conus medullaris and  cauda equina nerve roots are normal.    L1-L2: Minimal disc bulging. No stenosis.    L2-L3: Normal.    L3-L4: Normal.    L4-L5: There is broad-based disc protrusion with a central to left  paramedian partially extruded component causing moderate central canal  stenosis and moderate left lateral recess stenosis. There is no  foraminal stenosis. There is also some facet hypertrophy at this  level.    L5-S1: Moderate facet hypertrophy is present. There is no stenosis.  There is also minimal disc bulging.    Paraspinal soft tissues: Unremarkable as visualized.      Impression    IMPRESSION: L4-L5 degenerative disc disease with an accompanying  partially extruded central left paramedian disc extrusion protrusion  causing moderate central canal stenosis and moderate left-sided  lateral recess stenosis. Preliminary report was given by Dr. Morales.    JUANITO YIP MD   Glucose by meter   Result Value Ref Range    Glucose 175 (H) 70 - 99 mg/dL   CBC with platelets differential   Result Value Ref Range    WBC 9.2 4.0 - 11.0 10e9/L    RBC Count 4.03 (L) 4.4 - 5.9 10e12/L    Hemoglobin 13.5 13.3 - 17.7 g/dL    Hematocrit 38.1 (L) 40.0 - 53.0 %    MCV 95 78 - 100 fl    MCH 33.5 (H) 26.5 - 33.0 pg    MCHC 35.4 31.5 - 36.5 g/dL    RDW 14.1 10.0 - 15.0 %    Platelet Count 194 150 - 450 10e9/L    Diff Method Automated Method     % Neutrophils 76.8 %    % Lymphocytes 9.5 %    % Monocytes 11.6 %    % Eosinophils 1.3 %    % Basophils 0.3 %     % Immature Granulocytes 0.5 %    Nucleated RBCs 0 0 /100    Absolute Neutrophil 7.0 1.6 - 8.3 10e9/L    Absolute Lymphocytes 0.9 0.8 - 5.3 10e9/L    Absolute Monocytes 1.1 0.0 - 1.3 10e9/L    Absolute Eosinophils 0.1 0.0 - 0.7 10e9/L    Absolute Basophils 0.0 0.0 - 0.2 10e9/L    Abs Immature Granulocytes 0.1 0 - 0.4 10e9/L    Absolute Nucleated RBC 0.0    Comprehensive metabolic panel   Result Value Ref Range    Sodium 132 (L) 133 - 144 mmol/L    Potassium 3.4 3.4 - 5.3 mmol/L    Chloride 97 94 - 109 mmol/L    Carbon Dioxide 22 20 - 32 mmol/L    Anion Gap 13 3 - 14 mmol/L    Glucose 161 (H) 70 - 99 mg/dL    Urea Nitrogen 25 7 - 30 mg/dL    Creatinine 1.13 0.66 - 1.25 mg/dL    GFR Estimate 66 >60 mL/min/1.7m2    GFR Estimate If Black 80 >60 mL/min/1.7m2    Calcium 8.5 8.5 - 10.1 mg/dL    Bilirubin Total 1.7 (H) 0.2 - 1.3 mg/dL    Albumin 3.8 3.4 - 5.0 g/dL    Protein Total 6.7 (L) 6.8 - 8.8 g/dL    Alkaline Phosphatase 78 40 - 150 U/L    ALT 30 0 - 70 U/L    AST 16 0 - 45 U/L         Assessments & Plan (with Medical Decision Making)       I have reviewed the nursing notes.    I have reviewed the findings, diagnosis, plan and need for follow up with the patient.  Patient with known back pain and herniated disc at this time presentation included one episode of bowel incontinence and rectal exam which had slightly decreased rectal tone.  An MRI was then obtained to rule out cauda equina which essentially was negative at this time patient had been doing well with pain medication and will be discharged to home I am concerned about previous pain management and have discussed with the patient the need for close follow-up with his primary MD for possible intervention including a possible steroid injection patient understands this and will follow up with primary MD as soon as possible.    Discharge Medication List as of 8/29/2018  1:54 AM      START taking these medications    Details   oxyCODONE-acetaminophen (PERCOCET)  5-325 MG per tablet Take 1-2 tablets by mouth every 6 hours as needed for pain, Disp-8 tablet, R-0, Local Print             Final diagnoses:   Protrusion of lumbar intervertebral disc   Chronic midline low back pain without sciatica   I, Laurel Dickey, am serving as a trained medical scribe to document services personally performed by Raphael Jade MD, based on the provider's statements to me.   IRaphael MD, was physically present and have reviewed and verified the accuracy of this note documented by Laurel Dickey.    8/28/2018   [facility], [place], EMERGENCY DEPARTMENT     Raphael Jade MD  08/30/18 2121

## 2018-08-29 NOTE — DISCHARGE INSTRUCTIONS
Discharge to home with prescription for 8 Percocet tablets with plans to follow-up with your primary orthopedic surgeon as discussed for possible steroid injection into your lumbar spine for disc herniation.

## 2018-09-01 LAB — COPATH REPORT: NORMAL

## 2018-09-16 ENCOUNTER — TRANSFERRED RECORDS (OUTPATIENT)
Dept: HEALTH INFORMATION MANAGEMENT | Facility: CLINIC | Age: 63
End: 2018-09-16

## 2018-09-16 LAB
ALT SERPL-CCNC: 32 IU/L (ref 12–68)
AST SERPL-CCNC: 26 IU/L (ref 12–37)

## 2018-09-17 LAB
CREAT SERPL-MCNC: 1.01 MG/DL (ref 0.7–1.3)
GFR SERPL CREATININE-BSD FRML MDRD: >60 ML/MIN/1.73M2
GLUCOSE SERPL-MCNC: 130 MG/DL (ref 74–106)
POTASSIUM SERPL-SCNC: 4 MMOL/L (ref 3.5–5.1)

## 2018-09-19 ENCOUNTER — TRANSFERRED RECORDS (OUTPATIENT)
Dept: HEALTH INFORMATION MANAGEMENT | Facility: CLINIC | Age: 63
End: 2018-09-19

## 2018-10-18 ENCOUNTER — TRANSFERRED RECORDS (OUTPATIENT)
Dept: HEALTH INFORMATION MANAGEMENT | Facility: CLINIC | Age: 63
End: 2018-10-18

## 2018-10-30 ENCOUNTER — TRANSFERRED RECORDS (OUTPATIENT)
Dept: HEALTH INFORMATION MANAGEMENT | Facility: CLINIC | Age: 63
End: 2018-10-30

## 2018-11-20 ENCOUNTER — TRANSFERRED RECORDS (OUTPATIENT)
Dept: HEALTH INFORMATION MANAGEMENT | Facility: CLINIC | Age: 63
End: 2018-11-20

## 2018-11-27 ENCOUNTER — TRANSFERRED RECORDS (OUTPATIENT)
Dept: HEALTH INFORMATION MANAGEMENT | Facility: CLINIC | Age: 63
End: 2018-11-27

## 2018-11-27 NOTE — TELEPHONE ENCOUNTER
ANNA suggesting he try to get his dad to come see me in clinic. Obviously I can't discuss his medical issues with Emanuel   - - -

## 2018-12-04 ENCOUNTER — TRANSFERRED RECORDS (OUTPATIENT)
Dept: HEALTH INFORMATION MANAGEMENT | Facility: CLINIC | Age: 63
End: 2018-12-04

## 2018-12-10 ENCOUNTER — OFFICE VISIT (OUTPATIENT)
Dept: FAMILY MEDICINE | Facility: CLINIC | Age: 63
End: 2018-12-10
Payer: MEDICARE

## 2018-12-10 VITALS
SYSTOLIC BLOOD PRESSURE: 129 MMHG | HEART RATE: 78 BPM | BODY MASS INDEX: 32.33 KG/M2 | RESPIRATION RATE: 14 BRPM | HEIGHT: 67 IN | DIASTOLIC BLOOD PRESSURE: 86 MMHG | WEIGHT: 206 LBS | OXYGEN SATURATION: 95 % | TEMPERATURE: 98 F

## 2018-12-10 DIAGNOSIS — F31.9 BIPOLAR I DISORDER (H): ICD-10-CM

## 2018-12-10 DIAGNOSIS — E85.89 OTHER AMYLOIDOSIS (H): ICD-10-CM

## 2018-12-10 DIAGNOSIS — Z01.818 PREOP GENERAL PHYSICAL EXAM: Primary | ICD-10-CM

## 2018-12-10 DIAGNOSIS — E78.5 HYPERLIPIDEMIA LDL GOAL <100: ICD-10-CM

## 2018-12-10 DIAGNOSIS — E11.9 TYPE 2 DIABETES MELLITUS WITHOUT COMPLICATION, WITHOUT LONG-TERM CURRENT USE OF INSULIN (H): ICD-10-CM

## 2018-12-10 DIAGNOSIS — Z23 NEED FOR VACCINATION: ICD-10-CM

## 2018-12-10 DIAGNOSIS — I10 HYPERTENSION GOAL BP (BLOOD PRESSURE) < 140/90: ICD-10-CM

## 2018-12-10 DIAGNOSIS — E11.42 DIABETIC POLYNEUROPATHY ASSOCIATED WITH TYPE 2 DIABETES MELLITUS (H): ICD-10-CM

## 2018-12-10 DIAGNOSIS — E66.01 MORBID OBESITY (H): ICD-10-CM

## 2018-12-10 LAB — HBA1C MFR BLD: 5.3 % (ref 0–5.6)

## 2018-12-10 PROCEDURE — G0008 ADMIN INFLUENZA VIRUS VAC: HCPCS | Performed by: FAMILY MEDICINE

## 2018-12-10 PROCEDURE — 99215 OFFICE O/P EST HI 40 MIN: CPT | Mod: 25 | Performed by: FAMILY MEDICINE

## 2018-12-10 PROCEDURE — 90686 IIV4 VACC NO PRSV 0.5 ML IM: CPT | Performed by: FAMILY MEDICINE

## 2018-12-10 PROCEDURE — 93000 ELECTROCARDIOGRAM COMPLETE: CPT | Performed by: FAMILY MEDICINE

## 2018-12-10 PROCEDURE — 36415 COLL VENOUS BLD VENIPUNCTURE: CPT | Performed by: FAMILY MEDICINE

## 2018-12-10 PROCEDURE — 80048 BASIC METABOLIC PNL TOTAL CA: CPT | Performed by: FAMILY MEDICINE

## 2018-12-10 PROCEDURE — 83036 HEMOGLOBIN GLYCOSYLATED A1C: CPT | Performed by: FAMILY MEDICINE

## 2018-12-10 RX ORDER — ATORVASTATIN CALCIUM 10 MG/1
10 TABLET, FILM COATED ORAL DAILY
Qty: 90 TABLET | Refills: 3 | Status: SHIPPED | OUTPATIENT
Start: 2018-12-10 | End: 2019-12-02

## 2018-12-10 RX ORDER — ATORVASTATIN CALCIUM 10 MG/1
10 TABLET, FILM COATED ORAL DAILY
Qty: 210 TABLET | Refills: 1 | Status: SHIPPED | OUTPATIENT
Start: 2018-12-10 | End: 2018-12-26

## 2018-12-10 RX ORDER — METOPROLOL TARTRATE 25 MG/1
25 TABLET, FILM COATED ORAL 2 TIMES DAILY
Qty: 180 TABLET | Refills: 3 | Status: SHIPPED | OUTPATIENT
Start: 2018-12-10 | End: 2018-12-26

## 2018-12-10 RX ORDER — LISINOPRIL 20 MG/1
20 TABLET ORAL DAILY
Qty: 90 TABLET | Refills: 3 | Status: SHIPPED | OUTPATIENT
Start: 2018-12-10 | End: 2018-12-26

## 2018-12-10 RX ORDER — LISINOPRIL 20 MG/1
20 TABLET ORAL DAILY
Qty: 90 TABLET | Refills: 3 | Status: ON HOLD | OUTPATIENT
Start: 2018-12-10 | End: 2019-05-11

## 2018-12-10 RX ORDER — METFORMIN HCL 500 MG
1000 TABLET, EXTENDED RELEASE 24 HR ORAL
Qty: 360 TABLET | Refills: 1 | COMMUNITY
Start: 2018-12-10 | End: 2018-12-31

## 2018-12-10 ASSESSMENT — MIFFLIN-ST. JEOR: SCORE: 1693.04

## 2018-12-10 NOTE — NURSING NOTE
"Chief Complaint   Patient presents with     Pre-Op Exam       Initial /86   Pulse 78   Temp 98  F (36.7  C) (Oral)   Resp 14   Ht 1.702 m (5' 7\")   Wt 93.4 kg (206 lb)   SpO2 95%   BMI 32.26 kg/m   Estimated body mass index is 32.26 kg/m  as calculated from the following:    Height as of this encounter: 1.702 m (5' 7\").    Weight as of this encounter: 93.4 kg (206 lb).  BP completed using cuff size: regular    Health Maintenance that is potentially due pending provider review:  Health Maintenance Due   Topic Date Due     HIV SCREEN (SYSTEM ASSIGNED)  07/18/1974     ZOSTER IMMUNIZATION (1 of 2) 07/18/2006     PNEUMOVAX IMMUNIZATION 19-64 HIGHEST RISK (2 of 3 - PCV13) 05/27/2011     EYE EXAM Q1 YEAR  01/01/2017     ADVANCE DIRECTIVE PLANNING Q5 YRS  05/29/2017     INFLUENZA VACCINE (1) 09/01/2018     A1C Q6 MO  11/16/2018         Flu vaccine today  Others per provider  "

## 2018-12-10 NOTE — PROGRESS NOTES
[facility]  [address]  [facility] [address] [phone]  Dept: [phone]    PRE-OP EVALUATION:  Today's date: 12/10/2018    Parmjit Hernández (: 1955) presents for pre-operative evaluation assessment as requested by Dr. Celestina Sofia.  He requires evaluation and anesthesia risk assessment prior to undergoing surgery/procedure for treatment of chronic pain .    Fax number for surgical facility: [facility] [phone]  Primary Physician: Maribeth Ardon  Type of Anesthesia Anticipated: general     Patient has a Health Care Directive or Living Will:  YES on file at [facility]    Preop Questions 12/10/2018   Who is doing your surgery? celestina sofia   What are you having done? pain pump implant   Date of Surgery/Procedure: 2018   Facility or Hospital where procedure/surgery will be performed[facility] [place]   1.  Do you have a history of Heart attack, stroke, stent, coronary bypass surgery, or other heart surgery? No   2.  Do you ever have any pain or discomfort in your chest? YES - multisite pain   3.  Do you have a history of  Heart Failure? No   4.   Are you troubled by shortness of breath when:  walking on a level surface, or up a slight hill, or at night? No   5.  Do you currently have a cold, bronchitis or other respiratory infection? No   6.  Do you have a cough, shortness of breath, or wheezing? No   7.  Do you sometimes get pains in the calves of your legs when you walk? No   8. Do you or anyone in your family have previous history of blood clots? No   9.  Do you or does anyone in your family have a serious bleeding problem such as prolonged bleeding following surgeries or cuts? No   10. Have you ever had problems with anemia or been told to take iron pills? No   11. Have you had any abnormal blood loss such as black, tarry or bloody stools? No   12. Have you ever had a blood transfusion? No   13. Have you or  any of your relatives ever had problems with anesthesia? No   14. Do you have sleep apnea, excessive snoring or daytime drowsiness? No   15. Do you have any prosthetic heart valves? No   16. Do you have prosthetic joints? No         HPI:     HPI related to upcoming procedure: 64yo male with chronic multisite pain- Scheduled to have IT pain pump next week- Under care of Dr Hamilton. He had EIS injections, that did not help.  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 Neuropathic heredofamilial amyloidosis   DIABETES - Patient has a longstanding history of DiabetesType Type II . Patient is being treated with oral agents and denies significant side effects. Control has been good. Complicating factors include but are not limited to: hyperlipidemia.                                                                                                                            .  HYPERLIPIDEMIA - Patient has a long history of significant Hyperlipidemia requiring medication for treatment with recent good control. Patient reports no problems or side effects with the medication.                                                                                                                                                       .    MEDICAL HISTORY:     Patient Active Problem List    Diagnosis Date Noted     Morbid obesity (H) 08/28/2018     Priority: Medium     Peripheral edema 05/20/2018     Priority: Medium     Noncardiac  Continue with  furosemide (LASIX) 20 MG tablet,   potassium       chloride SA (K-DUR/KLOR-CON M) 10 MEQ CR  Tab once daily   Basic metabolic panel       Type 2 diabetes mellitus with other specified complication (H) 07/10/2017      Priority: Medium     Obstructive sleep apnea syndrome 06/29/2017     Priority: Medium     sleep study       Restless legs syndrome (RLS) 06/29/2017     Priority: Medium     Narcotic dependence (H) 09/06/2016     Priority: Medium     None in about 6 months- 8/1/17       Insomnia due to medical condition 08/23/2016     Priority: Medium     Other amyloidosis (H) 06/01/2016     Priority: Medium      #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 7/3/2018      Dr [id number]       Anxiety 05/11/2016     Priority: Medium     Chronic abdominal pain 03/15/2016     Priority: Medium     Bipolar I disorder (H) 09/01/2015     Priority: Medium     Under care of psychiatrist [facility]- Dr Rahman  doxepin 25 mg, 2 cap bedtime  DULoxetine 20 mg once daily   OLANZapine 7.5 mg  1 tab bedtime        Marianne (H) 08/20/2015     Priority: Medium     MENTAL HEALTH 08/17/2015     Priority: Medium     History of diverticulitis 01/27/2015     Priority: Medium     1/15-rxed with antibiotics       Advanced directives, counseling/discussion 05/29/2012     Priority: Medium     Discussed advance care planning with patient; information given to patient to review. 5/29/2012     Honoring choices letter mailed. 7-  Lynsey Bustamante RT (R)         Migraine 05/29/2012     Priority: Medium     Hypertension goal BP (blood pressure) < 140/90 10/11/2011     Priority: Medium     HYPERLIPIDEMIA LDL GOAL <100 10/31/2010     Priority: Medium     Type 2 diabetes mellitus without complication, without long-term current use of insulin (H) 05/22/2009     Priority: Medium     Obstructive sleep apnea 07/16/2008     Priority: Medium      Past Medical History:   Diagnosis Date     Allergic state      Amyloidosis (H)      Diabetes mellitus (H)     type 2     Headache(784.0)      Hypertension 2007     Myalgia and myositis, unspecified      Obsessive-compulsive disorders      Other acquired absence of  organ 94     Other specified viral warts      Pain in the abdomen      Pure hypercholesterolemia      Past Surgical History:   Procedure Laterality Date     BONE MARROW BIOPSY, BONE SPECIMEN, NEEDLE/TROCAR N/A 6/8/2016    Procedure: BIOPSY BONE MARROW;  Surgeon: Nathan Agrawal MD;  Location:  GI     C NONSPECIFIC PROCEDURE  94    Cholecystectomy     C NONSPECIFIC PROCEDURE  2000    repair deviated septum     CHOLECYSTECTOMY  1995    lap qian     COLONOSCOPY  2012    hx polyps     ESOPHAGOSCOPY, GASTROSCOPY, DUODENOSCOPY (EGD), COMBINED N/A 5/29/2015    Procedure: COMBINED ESOPHAGOSCOPY, GASTROSCOPY, DUODENOSCOPY (EGD), BIOPSY SINGLE OR MULTIPLE;  Surgeon: John Jacob MD;  Location:  GI     HERNIA REPAIR, UMBILICAL  2006     Current Outpatient Medications   Medication Sig Dispense Refill     atorvastatin (LIPITOR) 10 MG tablet Take 1 tablet (10 mg) by mouth daily 90 tablet 3     atorvastatin (LIPITOR) 10 MG tablet Take 1 tablet (10 mg) by mouth daily 210 tablet 1     lisinopril (PRINIVIL/ZESTRIL) 20 MG tablet Take 1 tablet (20 mg) by mouth daily 90 tablet 3     lisinopril (PRINIVIL/ZESTRIL) 20 MG tablet Take 1 tablet (20 mg) by mouth daily 90 tablet 3     metFORMIN (GLUCOPHAGE-XR) 500 MG 24 hr tablet Take 2 tablets (1,000 mg) by mouth 2 times daily (with meals) 360 tablet 1     metoprolol tartrate (LOPRESSOR) 25 MG tablet Take 1 tablet (25 mg) by mouth 2 times daily 180 tablet 3     acetaminophen (TYLENOL) 325 MG tablet Take 1 tablet (325 mg) by mouth daily as needed for mild pain 10 tablet 0     acetaminophen-codeine (TYLENOL #3) 300-30 MG per tablet Take 1 tablet by mouth daily as needed for severe pain maximum 2 tablet(s) per day (Patient not taking: Reported on 5/16/2018) 10 tablet 0     aspirin (ASPIRIN LOW DOSE) 81 MG tablet Take 1 tablet (81 mg) by mouth daily 30 tablet 3     divalproex (DEPAKOTE) 500 MG EC tablet Take 2 tablets (1,000 mg) by mouth daily 10 tablet 0     doxepin  (SINEQUAN) 25 MG capsule Take 2 capsules (50 mg) by mouth At Bedtime 10 capsule 0     DULoxetine HCl (CYMBALTA PO) Take 20 mg by mouth daily        gabapentin (NEURONTIN) 800 MG tablet Take 1 tablet (800 mg) by mouth 3 times daily (Patient not taking: Reported on 5/16/2018) 90 tablet 11     ketoconazole (NIZORAL) 2 % shampoo APPLY TO THE AFFECTED AREA AND WASH OFF AFTER 5 MINUTES 120 mL 2     lidocaine (LIDODERM) 5 % Patch Apply up to 3 patches to painful area at once for up to 12 h within a 24 h period.  Remove after 12 hours. (Patient not taking: Reported on 5/16/2018) 30 patch 0     metoprolol succinate (TOPROL-XL) 25 MG 24 hr tablet Take 1 tablet (25 mg) by mouth daily 90 tablet 1     OLANZapine (ZYPREXA) 7.5 MG tablet Take 1 tablet (7.5 mg) by mouth At Bedtime 5 tablet 0     order for DME Equipment being ordered: sharla hose (Patient not taking: Reported on 5/16/2018) 2 Box 1     order for DME Equipment being ordered: CPAP 1 Device 0     OXYCODONE HCL PO Take 7.5 mg by mouth 2 times daily       oxyCODONE-acetaminophen (PERCOCET) 5-325 MG per tablet Take 1-2 tablets by mouth every 6 hours as needed for pain 8 tablet 0     pantoprazole (PROTONIX) 40 MG EC tablet Take 1 tablet (40 mg) by mouth daily Take 30-60 minutes before a meal. 30 tablet 1     potassium chloride SA (K-DUR/KLOR-CON M) 10 MEQ CR tablet   1     pregabalin (LYRICA) 50 MG capsule Take 75 mg by mouth 3 times daily Rx by [facility]        ranitidine (ZANTAC) 150 MG tablet Take 1 tablet (150 mg) by mouth 2 times daily 60 tablet 1     SUMAtriptan (IMITREX) 5 MG/ACT nasal spray USE ONE TO TWO SPRAYS IN NOSTRIL AS NEEDED FOR MIGRANE. MAY REPEAT IN 2 HOURS, MAX 8 SPRAYS IN 24 HOURS. 1 each 3     OTC products: Aspirin    Allergies   Allergen Reactions     Liraglutide Other (See Comments)     Sulfa Drugs Swelling     Pt has taken  Taken sulfa drugs orally without trouble. He had problems with Sulfa eye drops. Eye swelled up     Victoza      Increased  "migraine frequency and severity      Latex Allergy: NO    Social History     Tobacco Use     Smoking status: Never Smoker     Smokeless tobacco: Never Used   Substance Use Topics     Alcohol use: No     Alcohol/week: 0.0 oz     History   Drug Use No       REVIEW OF SYSTEMS:   Constitutional, neuro, ENT, endocrine, pulmonary, cardiac, gastrointestinal, genitourinary, musculoskeletal, integument and psychiatric systems are negative, except as otherwise noted.    EXAM:   /86   Pulse 78   Temp 98  F (36.7  C) (Oral)   Resp 14   Ht 1.702 m (5' 7\")   Wt 93.4 kg (206 lb)   SpO2 95%   BMI 32.26 kg/m      GENERAL APPEARANCE: healthy, alert and no distress     EYES: EOMI,  PERRL     HENT: ear canals and TM's normal and nose and mouth without ulcers or lesions     NECK: no adenopathy, no asymmetry, masses, or scars and thyroid normal to palpation     RESP: lungs clear to auscultation - no rales, rhonchi or wheezes     CV: regular rates and rhythm, normal S1 S2, no S3 or S4 and no murmur, click or rub     ABDOMEN:  soft, nontender, no HSM or masses and bowel sounds normal     MS: extremities normal- no gross deformities noted, no evidence of inflammation in joints, FROM in all extremities.     SKIN: no suspicious lesions or rashes     NEURO: Normal strength and tone, sensory exam grossly normal, mentation intact and speech normal     PSYCH: mentation appears normal. and affect normal/bright     LYMPHATICS: No cervical adenopathy    DIAGNOSTICS:   EKG: appears normal, NSR, unchanged from previous tracings    Recent Labs   Lab Test 09/17/18 08/28/18 2015 08/28/18  1259 05/16/18  1239  01/27/18  1556  01/18/18  0900  02/19/17  0734   HGB  --  13.5 14.0 11.5*   < > 12.6*   < > 13.8   < > 12.8*   PLT  --  194 271 133*   < > 132*   < > 158   < > 87*   INR  --   --   --   --   --  1.06  --   --   --  1.08   NA  --  132* 129* 136   < > 126*   < > 131*   < > 134   POTASSIUM 4.0 3.4 3.7 4.7   < > 4.0   < > 3.7   < > 3.6 "   CR 1.01 1.13 0.90 0.82   < > 0.96   < > 0.82   < > 0.69   A1C  --   --   --  7.2*  --   --   --  7.7*   < >  --     < > = values in this interval not displayed.        IMPRESSION:   Reason for surgery/procedure: Multisite pain  Diagnosis/reason for consult: Patient understands Pre-Op CHG bathing instructions and has received the CHG product.      The proposed surgical procedure is considered INTERMEDIATE risk.    REVISED CARDIAC RISK INDEX  The patient has the following serious cardiovascular risks for perioperative complications such as (MI, PE, VFib and 3  AV Block):  No serious cardiac risks  INTERPRETATION: 1 risks: Class II (low risk - 0.9% complication rate)    The patient has the following additional risks for perioperative complications:  Morbid obesity      ICD-10-CM    1. Preop general physical exam Z01.818    2. Type 2 diabetes mellitus without complication, without long-term current use of insulin (H) E11.9 lisinopril (PRINIVIL/ZESTRIL) 20 MG tablet     metFORMIN (GLUCOPHAGE-XR) 500 MG 24 hr tablet     Basic metabolic panel  (Ca, Cl, CO2, Creat, Gluc, K, Na, BUN)     Hemoglobin A1c     EKG 12-lead complete w/read - Clinics   3. Diabetic polyneuropathy associated with type 2 diabetes mellitus (H) E11.42    4. Morbid obesity (H) E66.01    5. Hyperlipidemia LDL goal <100 E78.5 atorvastatin (LIPITOR) 10 MG tablet     atorvastatin (LIPITOR) 10 MG tablet   6. Hypertension goal BP (blood pressure) < 140/90 I10 lisinopril (PRINIVIL/ZESTRIL) 20 MG tablet     metoprolol tartrate (LOPRESSOR) 25 MG tablet     lisinopril (PRINIVIL/ZESTRIL) 20 MG tablet   7. Bipolar I disorder (H) F31.9    8. Other amyloidosis (H) E85.89    9. Need for vaccination Z23        RECOMMENDATIONS:       Obstructive Sleep Apnea (or suspected sleep apnea)  Hospital staff are advised to monitor for sleep related oxygen desaturations due to suspicion of NORMA  Patient had diagnosed sleep apnea- was prescribed CPAP.   Lost weight- and now not  using CPAP. Still at risk.  --Patient is to take all scheduled medications on the day of surgery EXCEPT for modifications listed below.    Diabetes Medication Use    -----Hold usual oral and non-insulin diabetic meds (e.g. Metformin, Actos, Glipizide) while NPO.       ACE Inhibitor or Angiotensin Receptor Blocker (ARB) Use  Ace inhibitor or Angiotensin Receptor Blocker (ARB) and will continue this medication due to the higher risk of uncontrolled perioperative hypertension (e.g. neurosurgical procedure)      APPROVAL GIVEN to proceed with proposed procedure, without further diagnostic evaluation       Signed Electronically by: Maribeth Ardon MD    Copy of this evaluation report is provided to requesting physician.    Opp Preop Guidelines    Revised Cardiac Risk Index

## 2018-12-11 ENCOUNTER — TRANSFERRED RECORDS (OUTPATIENT)
Dept: HEALTH INFORMATION MANAGEMENT | Facility: CLINIC | Age: 63
End: 2018-12-11

## 2018-12-11 LAB
ANION GAP SERPL CALCULATED.3IONS-SCNC: 8 MMOL/L (ref 3–14)
BUN SERPL-MCNC: 15 MG/DL (ref 7–30)
CALCIUM SERPL-MCNC: 8.4 MG/DL (ref 8.5–10.1)
CHLORIDE SERPL-SCNC: 109 MMOL/L (ref 94–109)
CO2 SERPL-SCNC: 23 MMOL/L (ref 20–32)
CREAT SERPL-MCNC: 0.91 MG/DL (ref 0.66–1.25)
GFR SERPL CREATININE-BSD FRML MDRD: 85 ML/MIN/1.7M2
GLUCOSE SERPL-MCNC: 128 MG/DL (ref 70–99)
POTASSIUM SERPL-SCNC: 3.7 MMOL/L (ref 3.4–5.3)
SODIUM SERPL-SCNC: 140 MMOL/L (ref 133–144)

## 2018-12-12 NOTE — RESULT ENCOUNTER NOTE
-Kidney function is normal (Cr, GFR), Sodium is normal, Potassium is normal, Calcium is  Slightly low. Glucose is normal.   -A1C (test of diabetes control the last 2-3 months) is at your goal.

## 2018-12-18 ENCOUNTER — ANCILLARY PROCEDURE (OUTPATIENT)
Dept: GENERAL RADIOLOGY | Facility: CLINIC | Age: 63
End: 2018-12-18
Attending: FAMILY MEDICINE
Payer: MEDICARE

## 2018-12-18 ENCOUNTER — OFFICE VISIT (OUTPATIENT)
Dept: FAMILY MEDICINE | Facility: CLINIC | Age: 63
End: 2018-12-18
Payer: MEDICARE

## 2018-12-18 ENCOUNTER — MYC MEDICAL ADVICE (OUTPATIENT)
Dept: FAMILY MEDICINE | Facility: CLINIC | Age: 63
End: 2018-12-18

## 2018-12-18 VITALS
OXYGEN SATURATION: 97 % | HEIGHT: 67 IN | HEART RATE: 83 BPM | SYSTOLIC BLOOD PRESSURE: 103 MMHG | DIASTOLIC BLOOD PRESSURE: 68 MMHG | BODY MASS INDEX: 33.48 KG/M2 | TEMPERATURE: 97.7 F | WEIGHT: 213.3 LBS

## 2018-12-18 DIAGNOSIS — E11.9 TYPE 2 DIABETES MELLITUS WITHOUT COMPLICATION, WITHOUT LONG-TERM CURRENT USE OF INSULIN (H): ICD-10-CM

## 2018-12-18 DIAGNOSIS — F31.9 BIPOLAR I DISORDER (H): ICD-10-CM

## 2018-12-18 DIAGNOSIS — E66.01 MORBID OBESITY (H): ICD-10-CM

## 2018-12-18 DIAGNOSIS — R60.9 EDEMA, UNSPECIFIED TYPE: Primary | ICD-10-CM

## 2018-12-18 DIAGNOSIS — M79.89 LEG SWELLING: ICD-10-CM

## 2018-12-18 DIAGNOSIS — I10 HYPERTENSION GOAL BP (BLOOD PRESSURE) < 140/90: ICD-10-CM

## 2018-12-18 DIAGNOSIS — I87.2 STASIS DERMATITIS OF BOTH LEGS: Primary | ICD-10-CM

## 2018-12-18 PROCEDURE — 71046 X-RAY EXAM CHEST 2 VIEWS: CPT | Mod: FY

## 2018-12-18 PROCEDURE — 99214 OFFICE O/P EST MOD 30 MIN: CPT | Performed by: FAMILY MEDICINE

## 2018-12-18 ASSESSMENT — MIFFLIN-ST. JEOR: SCORE: 1726.15

## 2018-12-18 NOTE — PATIENT INSTRUCTIONS
Keep leg elevated when not walking  Keep ace wrap on  Avoid complete bed rest   Ok to take lasix 20 mg twice daily for next 3-5 days  If more light headed follow up in clinic    Need blood test for Basic metabolic panel that includes electrolytes,glucose, kidney functions in next 1-2 weeks    No other medications changes    Call to schedule your imaging:[facility] or [facility] [phone] or [facility] [phone] for venous doppler to rule out deep venous thrombosis

## 2018-12-18 NOTE — PROGRESS NOTES
SUBJECTIVE:   Parmjit Hernández is a 63 year old male who presents to clinic today for the following health issues:      ED/UC Followup:    Facility:  [facility]  Date of visit: 12/17  Reason for visit: chest pain and lightheadedness  Current Status: chest pain has subsided but still experiencing dizziness    Edema- started frusemide since about a few days- has been taking twice daily   Feeling diziness   Pain clinic doubled the dose fentany 400 bupivacaine 4  Keflex twice daily started since 12/12 for 10 days   Pain is essentially the same  Has been in close follow up with [facility].       He was seen in emergency department- CT scan negative for pe-  Chest pain is better  Still a bit light headed  Wt Readings from Last 5 Encounters:   12/18/18 96.8 kg (213 lb 4.8 oz)   12/10/18 93.4 kg (206 lb)   08/28/18 103.4 kg (228 lb)   08/28/18 103.4 kg (228 lb)   05/16/18 112.4 kg (247 lb 11.2 oz)     BP Readings from Last 6 Encounters:   12/18/18 103/68   12/10/18 129/86   08/29/18 (!) 134/107   08/28/18 130/80   05/16/18 130/83   04/23/18 128/88       PROBLEMS TO ADD ON...    Problem list and histories reviewed & adjusted, as indicated.  Additional history: as documented    Patient Active Problem List   Diagnosis     Obstructive sleep apnea     HYPERLIPIDEMIA LDL GOAL <100     Hypertension goal BP (blood pressure) < 140/90     Advanced directives, counseling/discussion     Migraine     History of diverticulitis     MENTAL HEALTH     Marianne (H)     Bipolar I disorder (H)     Chronic abdominal pain     Anxiety     Other amyloidosis (H)     Insomnia due to medical condition     Narcotic dependence (H)     Obstructive sleep apnea syndrome     Type 2 diabetes mellitus without complication, without long-term current use of insulin (H)     Restless legs syndrome (RLS)     Type 2 diabetes mellitus with other specified complication (H)     Peripheral edema     Morbid obesity (H)     Past Surgical History:    Procedure Laterality Date     BONE MARROW BIOPSY, BONE SPECIMEN, NEEDLE/TROCAR N/A 6/8/2016    Procedure: BIOPSY BONE MARROW;  Surgeon: Nathan Agrawal MD;  Location:  GI     C NONSPECIFIC PROCEDURE  94    Cholecystectomy     C NONSPECIFIC PROCEDURE  2000    repair deviated septum     CHOLECYSTECTOMY  1995    lap qian     COLONOSCOPY  2012    hx polyps     ESOPHAGOSCOPY, GASTROSCOPY, DUODENOSCOPY (EGD), COMBINED N/A 5/29/2015    Procedure: COMBINED ESOPHAGOSCOPY, GASTROSCOPY, DUODENOSCOPY (EGD), BIOPSY SINGLE OR MULTIPLE;  Surgeon: John Jacob MD;  Location:  GI     HERNIA REPAIR, UMBILICAL  2006       Social History     Tobacco Use     Smoking status: Never Smoker     Smokeless tobacco: Never Used   Substance Use Topics     Alcohol use: No     Alcohol/week: 0.0 oz     Family History   Problem Relation Age of Onset     Cerebrovascular Disease Mother      C.A.D. Mother      Hypertension Mother      Alcohol/Drug Mother      Arthritis Mother      Cerebrovascular Disease Maternal Grandmother      C.A.D. Maternal Grandmother      Alcohol/Drug Maternal Grandmother      C.A.D. Father      Heart Disease Father      Hypertension Father      Alcohol/Drug Father      Allergies Father      Circulatory Father      Depression Father      Respiratory Father      Asthma Father      Alcohol/Drug Paternal Grandfather      Cerebrovascular Disease Paternal Grandfather      Depression Son      Psychotic Disorder Son         anxiety disorder     Depression Daughter      Cerebrovascular Disease Maternal Grandfather      Cerebrovascular Disease Paternal Grandmother      Diabetes Brother      Allergies Sister      Depression Sister      Gynecology Sister      Coronary Artery Disease No family hx of      Hyperlipidemia No family hx of      Breast Cancer No family hx of      Colon Cancer No family hx of      Prostate Cancer No family hx of      Other Cancer No family hx of      Anxiety Disorder No family hx of       "Mental Illness No family hx of      Substance Abuse No family hx of      Anesthesia Reaction No family hx of      Osteoporosis No family hx of      Genetic Disorder No family hx of      Thyroid Disease No family hx of      Obesity No family hx of      Unknown/Adopted No family hx of            Reviewed and updated as needed this visit by clinical staff       Reviewed and updated as needed this visit by Provider         ROS:  Constitutional, HEENT, cardiovascular, pulmonary, gi and gu systems are negative, except as otherwise noted.    OBJECTIVE:     /68   Pulse 83   Temp 97.7  F (36.5  C) (Oral)   Ht 1.702 m (5' 7\")   Wt 96.8 kg (213 lb 4.8 oz)   SpO2 97%   BMI 33.41 kg/m    Body mass index is 33.41 kg/m .  GENERAL: healthy, alert and no distress  HENT: ear canals and TM's normal, nose and mouth without ulcers or lesions  NECK: no adenopathy, no asymmetry, masses, or scars and thyroid normal to palpation  RESP: lungs clear to auscultation - no rales, rhonchi or wheezes  CV: regular rate and rhythm, normal S1 S2, no S3 or S4, no murmur, click or rub, no peripheral edema and peripheral pulses strong  ABDOMEN: soft, nontender, no hepatosplenomegaly, no masses and bowel sounds normal  MS: no gross musculoskeletal defects noted, no edema    Diagnostic Test Results:  No results found for this or any previous visit (from the past 24 hour(s)).    ASSESSMENT/PLAN:   Stasis dermatitis of both legs  Plan: bilateral pitting peripheral edema.  Advised ace wrap- compression  Elevate wrap when non ambulant.  Avoid complete bed rest     Light headedness  No orthostatic hypotension  Sitting 100/60  Standing 106/68    Bipolar I disorder (H)  Plan; medications reviewed  Advised to discuss with psychiatrist- if adding to his lightheadedness  No orthostatic hypotension  Advised follow up with psych        Type 2 diabetes mellitus without complication, without long-term current use of insulin (H)  Plan:no medications " changes          Maribeth Ardon MD  [facility]

## 2018-12-18 NOTE — NURSING NOTE
"Chief Complaint   Patient presents with     ER F/U     /68   Pulse 83   Temp 97.7  F (36.5  C) (Oral)   Ht 1.702 m (5' 7\")   Wt 96.8 kg (213 lb 4.8 oz)   SpO2 97%   BMI 33.41 kg/m   Estimated body mass index is 33.41 kg/m  as calculated from the following:    Height as of this encounter: 1.702 m (5' 7\").    Weight as of this encounter: 96.8 kg (213 lb 4.8 oz).  Medication Reconciliation: complete      Health Maintenance that is potentially due pending provider review:  NONE    n/a    BAR Mcfarland  "

## 2018-12-19 ENCOUNTER — MYC MEDICAL ADVICE (OUTPATIENT)
Dept: FAMILY MEDICINE | Facility: CLINIC | Age: 63
End: 2018-12-19

## 2018-12-20 ENCOUNTER — TRANSFERRED RECORDS (OUTPATIENT)
Dept: HEALTH INFORMATION MANAGEMENT | Facility: CLINIC | Age: 63
End: 2018-12-20

## 2018-12-20 ENCOUNTER — HOSPITAL ENCOUNTER (OUTPATIENT)
Dept: OCCUPATIONAL THERAPY | Facility: CLINIC | Age: 63
Setting detail: THERAPIES SERIES
End: 2018-12-20
Attending: FAMILY MEDICINE
Payer: MEDICARE

## 2018-12-20 DIAGNOSIS — I87.2 STASIS DERMATITIS OF BOTH LEGS: ICD-10-CM

## 2018-12-20 PROCEDURE — 97167 OT EVAL HIGH COMPLEX 60 MIN: CPT | Mod: GO | Performed by: OCCUPATIONAL THERAPIST

## 2018-12-20 PROCEDURE — 97535 SELF CARE MNGMENT TRAINING: CPT | Mod: GO | Performed by: OCCUPATIONAL THERAPIST

## 2018-12-20 PROCEDURE — G8988 SELF CARE GOAL STATUS: HCPCS | Mod: GO,CJ | Performed by: OCCUPATIONAL THERAPIST

## 2018-12-20 PROCEDURE — G8987 SELF CARE CURRENT STATUS: HCPCS | Mod: GO,CN | Performed by: OCCUPATIONAL THERAPIST

## 2018-12-20 RX ORDER — FUROSEMIDE 20 MG
20 TABLET ORAL 2 TIMES DAILY
Qty: 60 TABLET | Refills: 0 | Status: SHIPPED | OUTPATIENT
Start: 2018-12-20 | End: 2018-12-26

## 2018-12-20 NOTE — TELEPHONE ENCOUNTER
FS,   Please see below MyChart message in A.S' absence  Please advise on new Lasix Rx if appropriate  Maybe Lasix not working because it's ?  See other MyCharts from pt too  Please advise  Thanks,  Stacie MAYA RN

## 2018-12-20 NOTE — PROGRESS NOTES
"   12/20/18 0952   Rehab Discipline   Discipline OT   Type of Visit   Type of visit Initial Edema Evaluation   General Information   Start of care 12/20/18   Referring physician Maribeth Ardon MD   Orders Evaluate and treat as indicated   Order date 12/18/18   Medical diagnosis BLE stasis dermatitis/lymphedema/edema   Onset of illness / date of surgery 12/18/18   Edema onset 12/18/18   Edema etiology comments Pt first noted swellling bilateral below knees (BBK) approx 2 yrs ago (later in this eval acknowledged B thighs also swollen) s/p episode of chemotherapy at [facility] for amyloidosis. He was prescribed diuretics per pt specifically & only for the swelling in his legs. He has attempted applying long-stretch \"ACE\" bandages without success. He has not noted if elevation reduces the swelling.   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  Pt had pain pump surgically placed in RLQ 12.13.18. Lately he has c/o light-headed & dizzy.   Surgical / medical history reviewed Yes   Prior level of functional mobility Independent   Patient role / employment history Disabled  (Pt reports he is sedentary)   Psychosocial concerns (MD notes cite h/o adeel, bipolar d/o, & anxiety)   Living environment comments Lives independently in own apartment, rarely needs to use stairs.   Fall Risk Screen   Fall screen completed by OT   Have you fallen 2 or more times in the past year? No   Have you fallen and had an injury in the past year? No   Is patient a fall risk? " "(Unsure)   Fall screen comments \"I slipped in the tub and banged my ribs about 3 months ago, it still hurts but they did an x-ray and there is no broken ribs. I have a shower bench, but I was taking a bath that time to help alleviate pain.\"   System Outcome Measures   Lymphedema Life Impact Scale (score range 0-72). A higher score indicates greater impairment. 31   Subjective Report   Patient report of symptoms Skin tight BBK, pain, legs feel heavy, impaired fit of shoes, impaired ADL performance   Precautions   Precautions comments h/o diverticululitis, peripheral neuropathies, DM, severe chronic back pain   Pain   Pain comments Multi severe chronic pain issues, \"I spend most of my time in bed b/c the pain is so bad.\"   Cognitive Status   Orientation Orientation to person, place and time   Level of consciousness Alert   Follows commands and answers questions 100% of the time   Personal safety and judgement Intact   Memory (\"I don't have a short-term memory.\")   Cognitive status comments Often hyperverbal & tangential re own PMHx requiring redirection   Edema Exam / Assessment   Skin condition comments 3+ pitting edema BBK. B medial thighs firm with swelling, and shallow pitting there (pt painful to palpation medial thighs). Mild incr rubor of skin BBK without signs of infection. Stemmer's sign + B at 2nd toes. Skin dry/flaky around heels/ankles & toes. Pt denies swelling in genital region or BLQ.   Girth Measurements   Girth Measurements (Msmts taken on intiation of CDT)   Activities of Daily Living   Activities of Daily Living Friend occasionally stops in \"...to cheer me up.\", but pt reports independent in ADLs/IADLs. \"I don't shop or cook. I eat out twice a day...I have a love/hate relationship with eating b/c it exacerbates by pain. So as a result my weight has yo-yo'd.\" Was able to drive self to this appointment. On this date pt had strong sour body odor.   Gait / Locomotion   Gait / Locomotion Per pt indendent " "all mobility, transfers & ambulation   Sensory   Sensory perception comments DM polyneuropathies noted in Epic, pt cites onset s/p chemo, \"It's sharp, burning pain.\" Neuropathies primarily affect feet & hands, but can \"creep up\" into thighs or trunk.   Planned Edema Interventions   Planned edema intervention comments Recommend intensive Complete Decongestive Therapy (CDT). \"I don't think I have any choice, but I sure as hell don't want to do this, especially the part about doing laundry <short-stretch bandages washed after every 2nd use>.\"   Clinical Impression   Criteria for skilled therapeutic intervention met Yes   Therapy diagnosis BLE lymphedema likely secondary to complex medical issues exacerbated by obesity    Influenced by the following impairments / conditions Stage 2  (Stage 2=not reversible with elevation + soft-tissue fibrosis)   Assessment of Occupational Performance 5 or more Performance Deficits   Identified Performance Deficits Complex PMHx, multi meds, impaired fit of footwear, diff reaching feet for components of CDT, lives alone/lacks support, chronic pain, lacks knowledge re lymphedema, expressed resistance to CDT protocol, B thighs swollen but pt likely not able to tolerate GCB to full legs, will likely require adaptive compression garments not paid by Medicare for success in home program   Clinical Decision Making (Complexity) High complexity   Treatment frequency (5 x week x 2 weeks)   Patient / family and/or staff in agreement with plan of care (Unsure)   Risks and benefits of therapy have been explained Yes   Goal 1   Goal identifier BBK volume   Goal description For decreased risk infection, skin breakdown/wounds & progressive soft-tissue fibrosis volume will be reduced at least 300ml in ea LE BK.   Target date 02/01/19   Goal 2   Goal identifier GCB   Goal description To reduce volume of lymphedema and soft-tissue fibrosis pt will tolerate up to 23hr/day wear gradient compression " bandaging (GCB) BBK.   Target date 02/01/19   Goal 3   Goal identifier Home program   Goal description For long-term home mgmt chronic lymphedema pt/caregiver independent in home program a. GCB/GCB alternative garment for night wear b. compression garment for day wear c. ex to incr lymph flow/self-MLD.   Target date 02/01/19   Goal 4   Goal identifier Lymphedema Precautions   Goal description For decreased risk infection, skin breakdown/wounds, and progressive soft-tissue fibrosis patient will verbalize understanding re lymphedema precautions, and options to treat lymphedema.   Target date 02/01/19   Total Evaluation Time   OT Eval, High Complexity Minutes (36961) 30   OT G-codes   Self Care Current Status () CN   Self Care Current Status Modifier Rationale Decr BLE volume, pt independent HP   Certification   Certification date from 12/20/18   Certification date to 03/20/19   Medical Diagnosis BLE lymphedema

## 2018-12-20 NOTE — TELEPHONE ENCOUNTER
Rx for furosemide was signed. I would recommend proceeding with the lab ordered by Dr. Ardon ([facility]). Schedule an appointment if the swelling does not resolve.     Elaine

## 2018-12-20 NOTE — PROGRESS NOTES
"                                                                                                            [facility], [place]        OUTPATIENT OCCUPATIONAL THERAPY EDEMA EVALUATION  PLAN OF TREATMENT FOR OUTPATIENT REHABILITATION  (COMPLETE FOR INITIAL CLAIMS ONLY)  Patient's Last Name, First Name, Parmjit Hartley                           Provider s Name:   [facility] [place] Medical Record No.  [id number]     Start of Care Date:  12/20/18   Onset Date:  12/18/18   Type:  OT   Medical Diagnosis:  BLE lymphedema   Therapy Diagnosis:  BLE lymphedema likely secondary to complex medical issues exacerbated by obesity  Visits from SOC:  1                                     __________________________________________________________________________________   Plan of Treatment/Functional Goals:       Recommend intensive Complete Decongestive Therapy (CDT). \"I don't think I have any choice, but I sure as hell don't want to do this, especially the part about doing laundry <short-stretch bandages washed after every 2nd use>.\"     GOALS  1. Goal description: For decreased risk infection, skin breakdown/wounds & progressive soft-tissue fibrosis volume will be reduced at least 300ml in ea LE BK.       Target date: 02/01/19  2. Goal description: To reduce volume of lymphedema and soft-tissue fibrosis pt will tolerate up to 23hr/day wear gradient compression bandaging (GCB) BBK.       Target date: 02/01/19  3. Goal description: For long-term home mgmt chronic lymphedema pt/caregiver independent in home program a. GCB/GCB alternative garment for night wear b. compression garment for day wear c. ex to incr lymph flow/self-MLD.       Target date: 02/01/19  4. Goal description: For decreased risk infection, skin breakdown/wounds, and progressive soft-tissue fibrosis patient will verbalize understanding re lymphedema precautions, and options to treat lymphedema.       Target date: 02/01/19  5.        "     6.               7.             8.              Treatment frequency: (5 x week x 2 weeks)        Emili Hanley, BRYANNA                                    I CERTIFY THE NEED FOR THESE SERVICES FURNISHED UNDER        THIS PLAN OF TREATMENT AND WHILE UNDER MY CARE     (Physician co-signature of this document indicates review and certification of the therapy plan).                   Certification date from: 12/20/18       Certification date to: 03/20/19           Referring physician: Maribeth Ardon MD   Initial Assessment  See Epic Evaluation- Start of care: 12/20/18

## 2018-12-21 ENCOUNTER — OFFICE VISIT (OUTPATIENT)
Dept: FAMILY MEDICINE | Facility: CLINIC | Age: 63
End: 2018-12-21
Payer: MEDICARE

## 2018-12-21 ENCOUNTER — TELEPHONE (OUTPATIENT)
Dept: FAMILY MEDICINE | Facility: CLINIC | Age: 63
End: 2018-12-21

## 2018-12-21 VITALS
HEIGHT: 67 IN | BODY MASS INDEX: 36.79 KG/M2 | WEIGHT: 234.4 LBS | DIASTOLIC BLOOD PRESSURE: 69 MMHG | HEART RATE: 78 BPM | RESPIRATION RATE: 16 BRPM | TEMPERATURE: 98.4 F | SYSTOLIC BLOOD PRESSURE: 108 MMHG | OXYGEN SATURATION: 95 %

## 2018-12-21 DIAGNOSIS — I10 HYPERTENSION GOAL BP (BLOOD PRESSURE) < 140/90: ICD-10-CM

## 2018-12-21 DIAGNOSIS — R60.0 LOCALIZED EDEMA: ICD-10-CM

## 2018-12-21 DIAGNOSIS — R33.9 URINARY RETENTION: Primary | ICD-10-CM

## 2018-12-21 LAB
ALBUMIN UR-MCNC: NEGATIVE MG/DL
ANION GAP SERPL CALCULATED.3IONS-SCNC: 9 MMOL/L (ref 3–14)
APPEARANCE UR: CLEAR
BILIRUB UR QL STRIP: NEGATIVE
BUN SERPL-MCNC: 22 MG/DL (ref 7–30)
CALCIUM SERPL-MCNC: 8.3 MG/DL (ref 8.5–10.1)
CHLORIDE SERPL-SCNC: 103 MMOL/L (ref 94–109)
CO2 SERPL-SCNC: 24 MMOL/L (ref 20–32)
COLOR UR AUTO: YELLOW
CREAT SERPL-MCNC: 0.78 MG/DL (ref 0.66–1.25)
GFR SERPL CREATININE-BSD FRML MDRD: >90 ML/MIN/{1.73_M2}
GLUCOSE SERPL-MCNC: 268 MG/DL (ref 70–99)
GLUCOSE UR STRIP-MCNC: 100 MG/DL
HGB UR QL STRIP: NEGATIVE
KETONES UR STRIP-MCNC: NEGATIVE MG/DL
LEUKOCYTE ESTERASE UR QL STRIP: NEGATIVE
NITRATE UR QL: NEGATIVE
NON-SQ EPI CELLS #/AREA URNS LPF: ABNORMAL /LPF
PH UR STRIP: 5.5 PH (ref 5–7)
POTASSIUM SERPL-SCNC: 3.8 MMOL/L (ref 3.4–5.3)
RBC #/AREA URNS AUTO: ABNORMAL /HPF
SODIUM SERPL-SCNC: 136 MMOL/L (ref 133–144)
SOURCE: ABNORMAL
SP GR UR STRIP: 1.01 (ref 1–1.03)
UROBILINOGEN UR STRIP-ACNC: 0.2 EU/DL (ref 0.2–1)
WBC #/AREA URNS AUTO: ABNORMAL /HPF

## 2018-12-21 PROCEDURE — 99215 OFFICE O/P EST HI 40 MIN: CPT | Performed by: FAMILY MEDICINE

## 2018-12-21 PROCEDURE — 80048 BASIC METABOLIC PNL TOTAL CA: CPT | Performed by: FAMILY MEDICINE

## 2018-12-21 PROCEDURE — 36415 COLL VENOUS BLD VENIPUNCTURE: CPT | Performed by: FAMILY MEDICINE

## 2018-12-21 PROCEDURE — 81001 URINALYSIS AUTO W/SCOPE: CPT | Performed by: FAMILY MEDICINE

## 2018-12-21 ASSESSMENT — MIFFLIN-ST. JEOR: SCORE: 1821.86

## 2018-12-21 NOTE — TELEPHONE ENCOUNTER
Reason for Call:  Other     Detailed comments: pt is not able to get in to the lymphedema specialist for three weeks.  He needs to be seen sooner.  Are there any suggestions for what he should do?    Phone Number Patient can be reached at: Home number on file [phone] (home)    Best Time: any    Can we leave a detailed message on this number? YES    Call taken on 12/21/2018 at 9:04 AM by Laurel Zavala

## 2018-12-21 NOTE — PROGRESS NOTES
SUBJECTIVE:   Parmjit Hernández is a 63 year old male who presents to clinic today for the following health issues:      Medication Followup of Furosemide    Taking Medication as prescribed: yes, furosemide    Side Effects:  Possible dizziness and urinary retention    Medication Helping Symptoms:  NO     Initial consultation at the lymphedema clinic, no treatment yet, planned Tx in 3 week    He has been taking furosemide  But now maybe that is making him lightheaded    He is having to urinate a lot, but feels like he is not urinating enough  Wonders if he has a bladder infection  Wonders about a urine test    Was seen in the ED a couple times recently, obtained through care everywhereonce for chest pain with a negative cardiac workup and also for dizziness, of note BNP, troponin and other labs were normal      ROS: As per HPI.  Constitutional:  fevers/sweats/chills  Eyes: No vision changes or eye irritation  Ears/Nose/Throat: No runny nose, sore throat or ear pain  CV: no palpitations, no chest pain, no lower extremity swelling.  Resp: +shortness of breath, no wheezing, or cough.  GI: no nausea/vomiting/diarrhea, no black or bloody stools  : + urinary retention  Skin: healing incisions  Musculoskeletal: chronic pain  Psych: + anxiety  Neuro: no dizziness now    I have reviewed and updated the patient's past medical history in the EMR. Current problems are:    Patient Active Problem List   Diagnosis     Obstructive sleep apnea     HYPERLIPIDEMIA LDL GOAL <100     Hypertension goal BP (blood pressure) < 140/90     Advanced directives, counseling/discussion     Migraine     History of diverticulitis     MENTAL HEALTH     [facility] (H)     Bipolar I disorder (H)     Chronic abdominal pain     Anxiety     Other amyloidosis (H)     Insomnia due to medical condition     Narcotic dependence (H)     Obstructive sleep apnea syndrome     Type 2 diabetes mellitus without complication, without long-term current use of insulin (H)      Restless legs syndrome (RLS)     Type 2 diabetes mellitus with other specified complication (H)     Peripheral edema     Morbid obesity (H)     Past Surgical History:   Procedure Laterality Date     BONE MARROW BIOPSY, BONE SPECIMEN, NEEDLE/TROCAR N/A 6/8/2016    Procedure: BIOPSY BONE MARROW;  Surgeon: Nathan Agrawal MD;  Location:  GI     C NONSPECIFIC PROCEDURE  94    Cholecystectomy     C NONSPECIFIC PROCEDURE  2000    repair deviated septum     CHOLECYSTECTOMY  1995    lap qian     COLONOSCOPY  2012    hx polyps     ESOPHAGOSCOPY, GASTROSCOPY, DUODENOSCOPY (EGD), COMBINED N/A 5/29/2015    Procedure: COMBINED ESOPHAGOSCOPY, GASTROSCOPY, DUODENOSCOPY (EGD), BIOPSY SINGLE OR MULTIPLE;  Surgeon: John Jacob MD;  Location:  GI     HERNIA REPAIR, UMBILICAL  2006       Social History     Tobacco Use     Smoking status: Never Smoker     Smokeless tobacco: Never Used   Substance Use Topics     Alcohol use: No     Alcohol/week: 0.0 oz     Family History   Problem Relation Age of Onset     Cerebrovascular Disease Mother      C.A.D. Mother      Hypertension Mother      Alcohol/Drug Mother      Arthritis Mother      Cerebrovascular Disease Maternal Grandmother      C.A.D. Maternal Grandmother      Alcohol/Drug Maternal Grandmother      C.A.D. Father      Heart Disease Father      Hypertension Father      Alcohol/Drug Father      Allergies Father      Circulatory Father      Depression Father      Respiratory Father      Asthma Father      Alcohol/Drug Paternal Grandfather      Cerebrovascular Disease Paternal Grandfather      Depression Son      Psychotic Disorder Son         anxiety disorder     Depression Daughter      Cerebrovascular Disease Maternal Grandfather      Cerebrovascular Disease Paternal Grandmother      Diabetes Brother      Allergies Sister      Depression Sister      Gynecology Sister      Coronary Artery Disease No family hx of      Hyperlipidemia No family hx of      Breast  Cancer No family hx of      Colon Cancer No family hx of      Prostate Cancer No family hx of      Other Cancer No family hx of      Anxiety Disorder No family hx of      Mental Illness No family hx of      Substance Abuse No family hx of      Anesthesia Reaction No family hx of      Osteoporosis No family hx of      Genetic Disorder No family hx of      Thyroid Disease No family hx of      Obesity No family hx of      Unknown/Adopted No family hx of          Allergies, reviewed:     Allergies   Allergen Reactions     Liraglutide Other (See Comments)     Sulfa Drugs Swelling     Pt has taken  Taken sulfa drugs orally without trouble. He had problems with Sulfa eye drops. Eye swelled up     Victoza      Increased migraine frequency and severity       Current Outpatient Medications   Medication Sig Dispense Refill     aspirin (ASPIRIN LOW DOSE) 81 MG tablet Take 1 tablet (81 mg) by mouth daily 30 tablet 3     atorvastatin (LIPITOR) 10 MG tablet Take 1 tablet (10 mg) by mouth daily 90 tablet 3     atorvastatin (LIPITOR) 10 MG tablet Take 1 tablet (10 mg) by mouth daily 210 tablet 1     divalproex (DEPAKOTE) 500 MG EC tablet Take 2 tablets (1,000 mg) by mouth daily 10 tablet 0     doxepin (SINEQUAN) 25 MG capsule Take 2 capsules (50 mg) by mouth At Bedtime 10 capsule 0     DULoxetine HCl (CYMBALTA PO) Take 20 mg by mouth daily        furosemide (LASIX) 20 MG tablet Take 1 tablet (20 mg) by mouth 2 times daily 60 tablet 0     lidocaine (LIDODERM) 5 % Patch Apply up to 3 patches to painful area at once for up to 12 h within a 24 h period.  Remove after 12 hours. 30 patch 0     lisinopril (PRINIVIL/ZESTRIL) 20 MG tablet Take 1 tablet (20 mg) by mouth daily 90 tablet 3     lisinopril (PRINIVIL/ZESTRIL) 20 MG tablet Take 1 tablet (20 mg) by mouth daily 90 tablet 3     metFORMIN (GLUCOPHAGE-XR) 500 MG 24 hr tablet Take 2 tablets (1,000 mg) by mouth 2 times daily (with meals) 360 tablet 1     metoprolol succinate (TOPROL-XL)  "25 MG 24 hr tablet Take 1 tablet (25 mg) by mouth daily 90 tablet 1     metoprolol tartrate (LOPRESSOR) 25 MG tablet Take 1 tablet (25 mg) by mouth 2 times daily 180 tablet 3     OLANZapine (ZYPREXA) 7.5 MG tablet Take 1 tablet (7.5 mg) by mouth At Bedtime 5 tablet 0     order for DME Equipment being ordered: XL SHARLA Stockings 2 Units 2     order for DME Equipment being ordered:  2 - large ace wrap dispensed to patient today 30 Units 12     order for DME Equipment being ordered: sharla hose 2 Box 1     order for DME Equipment being ordered: CPAP 1 Device 0     oxyCODONE-acetaminophen (PERCOCET) 5-325 MG per tablet Take 1-2 tablets by mouth every 6 hours as needed for pain 8 tablet 0     pantoprazole (PROTONIX) 40 MG EC tablet Take 1 tablet (40 mg) by mouth daily Take 30-60 minutes before a meal. 30 tablet 1     potassium chloride SA (K-DUR/KLOR-CON M) 10 MEQ CR tablet   1     pregabalin (LYRICA) 50 MG capsule Take 75 mg by mouth 3 times daily Rx by [facility]        ranitidine (ZANTAC) 150 MG tablet Take 1 tablet (150 mg) by mouth 2 times daily 60 tablet 1     SUMAtriptan (IMITREX) 5 MG/ACT nasal spray USE ONE TO TWO SPRAYS IN NOSTRIL AS NEEDED FOR MIGRANE. MAY REPEAT IN 2 HOURS, MAX 8 SPRAYS IN 24 HOURS. 1 each 3     acetaminophen (TYLENOL) 325 MG tablet Take 1 tablet (325 mg) by mouth daily as needed for mild pain (Patient not taking: Reported on 12/21/2018) 10 tablet 0     acetaminophen-codeine (TYLENOL #3) 300-30 MG per tablet Take 1 tablet by mouth daily as needed for severe pain maximum 2 tablet(s) per day (Patient not taking: Reported on 12/21/2018) 10 tablet 0       Objective:  /69   Pulse 78   Temp 98.4  F (36.9  C) (Oral)   Resp 16   Ht 1.702 m (5' 7\")   Wt 106.3 kg (234 lb 6.4 oz)   SpO2 95%   BMI 36.71 kg/m    General Appearance: Pleasant, alert, WN/WD in no acute respiratory distress.  Chest/Respiratory Exam: Normal, comfortable, easy respirations. Chest wall normal. Lungs are clear to " auscultation. No wheezing, crackles, or rhonchi.  Cardiovascular Exam: Regular rate and rhythm. No murmur, gallop, or rubs. +++ pedal edema.  Skin: venous stasis change, tense edema, no weaping  Neurologic Exam: Nonfocal, no tremor. Normal gait.  Psychiatric Exam: Alert - appropriate, anxious affect    Results for orders placed or performed in visit on 12/21/18   UA with Microscopic reflex to Culture   Result Value Ref Range    Color Urine Yellow     Appearance Urine Clear     Glucose Urine 100 (A) NEG^Negative mg/dL    Bilirubin Urine Negative NEG^Negative    Ketones Urine Negative NEG^Negative mg/dL    Specific Gravity Urine 1.010 1.003 - 1.035    pH Urine 5.5 5.0 - 7.0 pH    Protein Albumin Urine Negative NEG^Negative mg/dL    Urobilinogen Urine 0.2 0.2 - 1.0 EU/dL    Nitrite Urine Negative NEG^Negative    Blood Urine Negative NEG^Negative    Leukocyte Esterase Urine Negative NEG^Negative    Source Midstream Urine     WBC Urine 0 - 5 OTO5^0 - 5 /HPF    RBC Urine O - 2 OTO2^O - 2 /HPF    Squamous Epithelial /LPF Urine Few FEW^Few /LPF       ASSESSMENT/PLAN:    ICD-10-CM    1. Urinary retention R33.9 UA with Microscopic reflex to Culture   2. Localized edema R60.0 UA with Microscopic reflex to Culture      Negative for Urinary infection  No protein    relative Retention from implantation of spinal stim?  He is getting BMP rechecked today    I applied a 3 layer dressing to his legs for compression to help with the edema  He should leave this on as long as possible  Follow up with edema clinic  Follow up with the pain clinic    Counselled on Diagnosis, medications, adjunct treatment and appropriate follow up. Couns time: 25 minutes of 40 minutes total face to face time      Alejandro Booker MD MPH

## 2018-12-24 ENCOUNTER — TRANSFERRED RECORDS (OUTPATIENT)
Dept: HEALTH INFORMATION MANAGEMENT | Facility: CLINIC | Age: 63
End: 2018-12-24

## 2018-12-24 LAB — EJECTION FRACTION: >70

## 2018-12-26 ENCOUNTER — OFFICE VISIT (OUTPATIENT)
Dept: FAMILY MEDICINE | Facility: CLINIC | Age: 63
End: 2018-12-26
Payer: MEDICARE

## 2018-12-26 VITALS
WEIGHT: 246.38 LBS | HEART RATE: 90 BPM | BODY MASS INDEX: 38.59 KG/M2 | TEMPERATURE: 97.4 F | DIASTOLIC BLOOD PRESSURE: 77 MMHG | OXYGEN SATURATION: 95 % | SYSTOLIC BLOOD PRESSURE: 114 MMHG

## 2018-12-26 DIAGNOSIS — N17.9 AKI (ACUTE KIDNEY INJURY) (H): ICD-10-CM

## 2018-12-26 DIAGNOSIS — G47.33 OBSTRUCTIVE SLEEP APNEA: ICD-10-CM

## 2018-12-26 DIAGNOSIS — E11.69 TYPE 2 DIABETES MELLITUS WITH OTHER SPECIFIED COMPLICATION, WITHOUT LONG-TERM CURRENT USE OF INSULIN (H): ICD-10-CM

## 2018-12-26 DIAGNOSIS — R33.9 URINARY RETENTION: ICD-10-CM

## 2018-12-26 DIAGNOSIS — I87.2 STASIS DERMATITIS OF BOTH LEGS: ICD-10-CM

## 2018-12-26 DIAGNOSIS — E66.01 MORBID OBESITY (H): ICD-10-CM

## 2018-12-26 DIAGNOSIS — R60.0 PERIPHERAL EDEMA: Primary | ICD-10-CM

## 2018-12-26 PROCEDURE — 99214 OFFICE O/P EST MOD 30 MIN: CPT | Performed by: FAMILY MEDICINE

## 2018-12-26 PROCEDURE — 36415 COLL VENOUS BLD VENIPUNCTURE: CPT | Performed by: FAMILY MEDICINE

## 2018-12-26 PROCEDURE — 80048 BASIC METABOLIC PNL TOTAL CA: CPT | Performed by: FAMILY MEDICINE

## 2018-12-26 NOTE — NURSING NOTE
"Chief Complaint   Patient presents with     Hospital F/U     /77 (BP Location: Right arm, Patient Position: Sitting, Cuff Size: Adult Regular)   Pulse 90   Temp 97.4  F (36.3  C) (Oral)   Wt 111.8 kg (246 lb 6 oz)   SpO2 95%   BMI 38.59 kg/m   Estimated body mass index is 38.59 kg/m  as calculated from the following:    Height as of 12/21/18: 1.702 m (5' 7\").    Weight as of this encounter: 111.8 kg (246 lb 6 oz).  bp completed using cuff size: large      Health Maintenance addressed:  NONE    n/a              "

## 2018-12-26 NOTE — PROGRESS NOTES
SUBJECTIVE:   Parmjit Hernández is a 63 year old male who presents to clinic today for the following health issues:      ED/UC Followup:    Facility:  [facility] E\R  Date of visit: 12/24/2018  Reason for visit: full bladder and in pain  Current Status: stats he has water weight   All.BAR Epps    Patient presents to clinic to address multiple concerns.  He was recently seen in the emergency room for evaluation of episodes of hyperglycemia as well as bilateral lower extremity swelling.  Most recent visit was yesterday.  The patient reported urinary retention and difficulty breathing.  He underwent venous ultrasound of bilateral lower extremities which did not reveal any acute DVTs.  In the emergency room the patient was started on torosemide and discharged home  He was also given a prescription of Flomax to assist with the urinary retention.  The patient has not started any of his medications today.     For diabetes.  The patient had a blood glucose that was greater than 550.  He was given fluids and discharged home.    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  Denies any bleeding in the urine or the stools.      Problem list and histories reviewed & adjusted, as indicated.  Additional history: as documented    Patient Active Problem List   Diagnosis     Obstructive sleep apnea     HYPERLIPIDEMIA LDL GOAL <100     Hypertension goal BP (blood pressure) < 140/90     Advanced directives, counseling/discussion     Migraine     History of diverticulitis     MENTAL HEALTH     Marianne (H)     Bipolar I disorder (H)     Chronic abdominal pain     Anxiety     Other amyloidosis (H)     Insomnia due to medical condition     Narcotic dependence (H)     Obstructive sleep apnea syndrome     Type 2 diabetes mellitus without complication, without long-term current use of insulin (H)     Restless legs syndrome (RLS)     Type 2 diabetes mellitus with other specified complication (H)     Peripheral edema      Morbid obesity (H)     Stasis dermatitis of both legs     Past Surgical History:   Procedure Laterality Date     BONE MARROW BIOPSY, BONE SPECIMEN, NEEDLE/TROCAR N/A 6/8/2016    Procedure: BIOPSY BONE MARROW;  Surgeon: Nathan Agrawal MD;  Location:  GI     C NONSPECIFIC PROCEDURE  94    Cholecystectomy     C NONSPECIFIC PROCEDURE  2000    repair deviated septum     CHOLECYSTECTOMY  1995    lap qian     COLONOSCOPY  2012    hx polyps     ESOPHAGOSCOPY, GASTROSCOPY, DUODENOSCOPY (EGD), COMBINED N/A 5/29/2015    Procedure: COMBINED ESOPHAGOSCOPY, GASTROSCOPY, DUODENOSCOPY (EGD), BIOPSY SINGLE OR MULTIPLE;  Surgeon: John Jacob MD;  Location:  GI     HERNIA REPAIR, UMBILICAL  2006       Social History     Tobacco Use     Smoking status: Never Smoker     Smokeless tobacco: Never Used   Substance Use Topics     Alcohol use: No     Alcohol/week: 0.0 oz     Family History   Problem Relation Age of Onset     Cerebrovascular Disease Mother      C.A.D. Mother      Hypertension Mother      Alcohol/Drug Mother      Arthritis Mother      Cerebrovascular Disease Maternal Grandmother      C.A.D. Maternal Grandmother      Alcohol/Drug Maternal Grandmother      C.A.D. Father      Heart Disease Father      Hypertension Father      Alcohol/Drug Father      Allergies Father      Circulatory Father      Depression Father      Respiratory Father      Asthma Father      Alcohol/Drug Paternal Grandfather      Cerebrovascular Disease Paternal Grandfather      Depression Son      Psychotic Disorder Son         anxiety disorder     Depression Daughter      Cerebrovascular Disease Maternal Grandfather      Cerebrovascular Disease Paternal Grandmother      Diabetes Brother      Allergies Sister      Depression Sister      Gynecology Sister      Coronary Artery Disease No family hx of      Hyperlipidemia No family hx of      Breast Cancer No family hx of      Colon Cancer No family hx of      Prostate Cancer No family  hx of      Other Cancer No family hx of      Anxiety Disorder No family hx of      Mental Illness No family hx of      Substance Abuse No family hx of      Anesthesia Reaction No family hx of      Osteoporosis No family hx of      Genetic Disorder No family hx of      Thyroid Disease No family hx of      Obesity No family hx of      Unknown/Adopted No family hx of            Reviewed and updated as needed this visit by clinical staff  Allergies  Meds       ROS:  Constitutional, HEENT, cardiovascular, pulmonary, gi and gu systems are negative, except as otherwise noted.    OBJECTIVE:     /77 (BP Location: Right arm, Patient Position: Sitting, Cuff Size: Adult Regular)   Pulse 90   Temp 97.4  F (36.3  C) (Oral)   Wt 111.8 kg (246 lb 6 oz)   SpO2 95%   BMI 38.59 kg/m    Body mass index is 38.59 kg/m .  GENERAL: healthy, alert and no distress  RESP: lungs clear to auscultation - no rales, rhonchi or wheezes  CV: regular rate and rhythm, normal S1 S2, no S3 or S4, no murmur, click or rub, no peripheral edema and peripheral pulses strong  MS: +2 pitting edema in bilateral lower extremities.  Range of motion is intact in bilateral lower extremities.  Stasis dermatitis in bilateral lower extremities.    Diagnostic Test Results:  Results for orders placed or performed in visit on 12/26/18   Basic metabolic panel   Result Value Ref Range    Sodium 134 133 - 144 mmol/L    Potassium 4.3 3.4 - 5.3 mmol/L    Chloride 100 94 - 109 mmol/L    Carbon Dioxide 25 20 - 32 mmol/L    Anion Gap 9 3 - 14 mmol/L    Glucose 646 (HH) 70 - 99 mg/dL    Urea Nitrogen 25 7 - 30 mg/dL    Creatinine 1.06 0.66 - 1.25 mg/dL    GFR Estimate 73 >60 mL/min/[1.73_m2]    GFR Estimate If Black 85 >60 mL/min/[1.73_m2]    Calcium 8.1 (L) 8.5 - 10.1 mg/dL       ASSESSMENT/PLAN:     1. Peripheral edema  Assessment: The patient has a long history of bilateral lower extremity lymphedema.  Etiology of the lymphedema is multifactorial which includes  obesity, venous insufficiency, hypertension and acute renal injury.  Plan:  -The patient was advised to start medications given in the emergency room which includes Flomax as well as torosemide.  -Patient was advised to schedule an appointment with the lymphedema clinic.  He was also advised to obtain compression stockings for bilateral lower extremities.    2. Stasis dermatitis of both legs  Advised to schedule an appointment with the lymphedema clinic    3. KWABENA (acute kidney injury) (H)  Recent results from emergency room were reviewed.  The patient was noted to have an elevated creatinine of 1.32 as well as low GFR of 55.  Exact etiology of the KWABENA  is not clear at this time.  The patient was advised to take his medications as prescribed.  Plan:  - Optimize control of diabetes and hypertension.    4. Type 2 diabetes mellitus with other specified complication, without long-term current use of insulin (H)  Assessment: Poorly controlled type 2 diabetes secondary to noncompliance with dietary restrictions.  The patient was noted to have an elevated blood glucose.  Blood glucose was 551.  In today's visit, the patient was also noted to have a severely elevated blood glucose.  Currently he is taking metformin 1000 mg once daily.  Plan:  -Increase metformin to 1000 mg twice daily.    5. Morbid obesity (H)  Morbid obesity with BMI of 37.    6. Obstructive sleep apnea  Long history of obstructive sleep apnea.  Patient is not using CPAP at home.  The patient has been using it on and off for the past few years.  Has not used it for more than 1 year.    7.  Urinary retention   .-Urinary symptoms have been documented in his chart since 2012.  He does not recall any recent urology evaluation.  Currently the patient denies any urinary retention.  Able to void without difficulty.  Urine flow is much better with the use of Flomax as well as Lasix.    Advised to return to clinic in 1 week for reevaluation.    -   Vince Henry,  MD  [facility]

## 2018-12-27 LAB
ANION GAP SERPL CALCULATED.3IONS-SCNC: 9 MMOL/L (ref 3–14)
BUN SERPL-MCNC: 25 MG/DL (ref 7–30)
CALCIUM SERPL-MCNC: 8.1 MG/DL (ref 8.5–10.1)
CHLORIDE SERPL-SCNC: 100 MMOL/L (ref 94–109)
CO2 SERPL-SCNC: 25 MMOL/L (ref 20–32)
CREAT SERPL-MCNC: 1.06 MG/DL (ref 0.66–1.25)
GFR SERPL CREATININE-BSD FRML MDRD: 73 ML/MIN/{1.73_M2}
GLUCOSE SERPL-MCNC: 646 MG/DL (ref 70–99)
POTASSIUM SERPL-SCNC: 4.3 MMOL/L (ref 3.4–5.3)
SODIUM SERPL-SCNC: 134 MMOL/L (ref 133–144)

## 2018-12-27 NOTE — RESULT ENCOUNTER NOTE
Results were discussed over the phone. Patient was in the ER earlier and was noted to have a blood glucose in the 300's. Denies any symptoms at this time.     Most recent A1C = 6.2%. (12/24/18). The patient is anemic with a hemoglobin of 9.2mg/dl which can falsely decrease his A1C results.     Dear Erickson,   Here are your recent results. Due to fluctuations in your blood glucose, I would recommend seeing our MTM team and checking your blood glucose 4 times daily x 5 days.   Fasting,   2 hours after breakfast, lunch and dinner.     See you next week. Please bring your glucose meter with you.     Vince Henry MD

## 2018-12-29 ENCOUNTER — NURSE TRIAGE (OUTPATIENT)
Dept: NURSING | Facility: CLINIC | Age: 63
End: 2018-12-29

## 2018-12-29 NOTE — TELEPHONE ENCOUNTER
Patient calling about chronic LE edema. Patient would like to increase his dose of torsemide. He is currently taking 40 mg QD and at last visit it was discussed to possibly increase it. Patient is scheduled for f/u on Monday and the plan was to re-evaluate it at that time. Patient states the chronic edema causes significant pain and he thinks increasing the torsemide will remove the fluid faster and help with the pain. Patient reports his edema and SOB has improved. SOB is mild with activity. Has mild hand swelling also. Spoke with Dr. Ardon who is on call and discussed patient's request. She advised mark stockings or ACE wrap and elevating legs and to wait until evaluated Monday before adjusting medications. This message given to patient. He does not have Mark stockings at home but does have ACE wrap, he didn't feel it was helpful yesterday so is not wearing them today. He also feels he cannot apply them correctly. Does not have anyone who could come help him. He will try and wear the ACE wrap again today and f/u with appt Monday.    Reason for Disposition    Swelling of face, arm or hands  (Exception: slight puffiness of fingers occurring during hot weather)    Additional Information    Negative: Severe difficulty breathing (e.g., struggling for each breath, speaks in single words)    Negative: Looks like a broken bone or dislocated joint (e.g., crooked or deformed)    Negative: Sounds like a life-threatening emergency to the triager    Negative: Chest pain    Negative: Followed a leg injury    Negative: [1] Small area of swelling AND [2] followed an insect bite to the area    Negative: Swelling of one ankle joint    Negative: Swelling of knee is main symptom    Negative: Pregnant    Negative: Postpartum (< 1 month since delivery)    Negative: Difficulty breathing at rest    Negative: Entire foot is cool or blue in comparison to other side    Negative: [1] Can't walk or can barely walk AND [2] new onset     Negative: [1] Difficulty breathing with exertion (e.g., walking) AND [2] new onset or worsening    Negative: [1] Red area or streak AND [2] fever    Negative: [1] Swelling is painful to touch AND [2] fever    Negative: [1] Cast on leg or ankle AND [2] now increased pain    Negative: Patient sounds very sick or weak to the triager    Negative: SEVERE leg swelling (e.g., swelling extends above knee, entire leg is swollen, weeping fluid)    Negative: [1] Red area or streak [2] large (> 2 in. or 5 cm)    Negative: [1] Thigh or calf pain AND [2] only 1 side AND [3] present > 1 hour    Negative: [1] Thigh, calf, or ankle swelling AND [2] only 1 side    Negative: [1] Thigh, calf, or ankle swelling AND [2] bilateral AND [3] 1 side is more swollen    Negative: [1] MODERATE leg swelling (e.g., swelling extends up to knees) AND [2] new onset or worsening    Protocols used: LEG SWELLING AND EDEMA-ADULT-

## 2018-12-30 ENCOUNTER — NURSE TRIAGE (OUTPATIENT)
Dept: NURSING | Facility: CLINIC | Age: 63
End: 2018-12-30

## 2018-12-30 NOTE — TELEPHONE ENCOUNTER
"\"I am feeling very dizzy and short of breath.\" Patient calling reporting he checked his blood sugar and it was 400 \"non fasting\" in past 5 minutes. Stating he is taking Metformin and feels dose may need to be increased. Patient reporting he has been seen in the Emergency Room 12/27/18 and 12/28/18. Reporting shortness of breath is increasing today. Patient reporting he is able to stand and walk \"slowly.\"    Advised patient to return to Emergency Room due to increasing shortness of breath. Caller verbalized understanding. Denies further questions.  Patient plans to contact [employer] or [facility] for transportation.     Bernie Saldivar RN  [facility]        Reason for Disposition    Blood glucose > 400 mg/dl (22 mmol/l)    Additional Information    Negative: Unconscious or difficult to awaken    Negative: Acting confused (e.g., disoriented, slurred speech)    Negative: Very weak (e.g., can't stand)    Negative: Sounds like a life-threatening emergency to the triager    Negative: [1] Vomiting AND [2] signs of dehydration (e.g., very dry mouth, lightheaded, etc.)    Negative: [1] Blood glucose > 240 mg/dl (13 mmol/l) AND [2] rapid breathing    Negative: Blood glucose > 500 mg/dl (27.5 mmol/l)    Negative: [1] Blood glucose > 240 mg/dl (13 mmol/l) AND [2] urine ketones moderate-large (or more than 1+)    Negative: [1] Blood glucose > 240 mg/dl (13 mmol/l) AND [2] blood ketones > 1.5 mmol/l    Negative: [1] Blood glucose > 240 mg/dl (13 mmol/l) AND [2] vomiting AND [3] unable to check for ketones (in blood or urine)    Negative: [1] New onset Diabetes suspected (e.g., frequent urination, weak, weight loss) AND [2] vomiting or rapid breathing    Negative: Vomiting lasts > 4 hours    Negative: Patient sounds very sick or weak to the triager    Negative: Fever > 100.5 F (38.1 C)    Protocols used: DIABETES - HIGH BLOOD SUGAR-ADULT-AH      "

## 2018-12-31 ENCOUNTER — OFFICE VISIT (OUTPATIENT)
Dept: FAMILY MEDICINE | Facility: CLINIC | Age: 63
End: 2018-12-31
Payer: MEDICARE

## 2018-12-31 ENCOUNTER — TELEPHONE (OUTPATIENT)
Dept: LAB | Facility: CLINIC | Age: 63
End: 2018-12-31

## 2018-12-31 VITALS
BODY MASS INDEX: 37.76 KG/M2 | DIASTOLIC BLOOD PRESSURE: 81 MMHG | WEIGHT: 241.06 LBS | OXYGEN SATURATION: 96 % | HEART RATE: 85 BPM | SYSTOLIC BLOOD PRESSURE: 126 MMHG | TEMPERATURE: 98.5 F

## 2018-12-31 DIAGNOSIS — E11.9 TYPE 2 DIABETES MELLITUS WITHOUT COMPLICATION, WITHOUT LONG-TERM CURRENT USE OF INSULIN (H): Primary | ICD-10-CM

## 2018-12-31 DIAGNOSIS — G47.33 OBSTRUCTIVE SLEEP APNEA SYNDROME: ICD-10-CM

## 2018-12-31 DIAGNOSIS — I89.0 LYMPHEDEMA: ICD-10-CM

## 2018-12-31 DIAGNOSIS — R73.9 HYPERGLYCEMIA: Primary | ICD-10-CM

## 2018-12-31 DIAGNOSIS — R33.9 URINARY RETENTION: ICD-10-CM

## 2018-12-31 LAB
ANION GAP SERPL CALCULATED.3IONS-SCNC: 9 MMOL/L (ref 3–14)
BUN SERPL-MCNC: 41 MG/DL (ref 7–30)
CALCIUM SERPL-MCNC: 8.4 MG/DL (ref 8.5–10.1)
CHLORIDE SERPL-SCNC: 98 MMOL/L (ref 94–109)
CO2 SERPL-SCNC: 27 MMOL/L (ref 20–32)
CREAT SERPL-MCNC: 0.96 MG/DL (ref 0.66–1.25)
GFR SERPL CREATININE-BSD FRML MDRD: 84 ML/MIN/{1.73_M2}
GLUCOSE BLD-MCNC: >444 MG/DL (ref 70–99)
GLUCOSE SERPL-MCNC: 574 MG/DL (ref 70–99)
POTASSIUM SERPL-SCNC: 4.2 MMOL/L (ref 3.4–5.3)
SODIUM SERPL-SCNC: 134 MMOL/L (ref 133–144)

## 2018-12-31 PROCEDURE — 36415 COLL VENOUS BLD VENIPUNCTURE: CPT | Performed by: FAMILY MEDICINE

## 2018-12-31 PROCEDURE — 80048 BASIC METABOLIC PNL TOTAL CA: CPT | Performed by: FAMILY MEDICINE

## 2018-12-31 PROCEDURE — 99214 OFFICE O/P EST MOD 30 MIN: CPT | Performed by: FAMILY MEDICINE

## 2018-12-31 PROCEDURE — 82947 ASSAY GLUCOSE BLOOD QUANT: CPT | Performed by: FAMILY MEDICINE

## 2018-12-31 RX ORDER — TORSEMIDE 20 MG/1
80 TABLET ORAL DAILY
Qty: 120 TABLET | Refills: 0 | Status: SHIPPED | OUTPATIENT
Start: 2018-12-31 | End: 2019-01-09

## 2018-12-31 RX ORDER — GLIPIZIDE 5 MG/1
5 TABLET, FILM COATED, EXTENDED RELEASE ORAL DAILY
Qty: 90 TABLET | Refills: 3 | Status: SHIPPED | OUTPATIENT
Start: 2018-12-31 | End: 2019-02-28

## 2018-12-31 RX ORDER — GLIPIZIDE 10 MG/1
10 TABLET, FILM COATED, EXTENDED RELEASE ORAL DAILY
Qty: 90 TABLET | Refills: 3 | Status: CANCELLED | OUTPATIENT
Start: 2018-12-31 | End: 2019-12-31

## 2018-12-31 RX ORDER — METFORMIN HCL 500 MG
1000 TABLET, EXTENDED RELEASE 24 HR ORAL 2 TIMES DAILY WITH MEALS
Qty: 120 TABLET | Refills: 0 | Status: SHIPPED | OUTPATIENT
Start: 2018-12-31 | End: 2019-02-28

## 2018-12-31 ASSESSMENT — PAIN SCALES - GENERAL: PAINLEVEL: NO PAIN (0)

## 2018-12-31 NOTE — PROGRESS NOTES
.  SUBJECTIVE:   Parmjit Hernández is a 63 year old male who presents to clinic today for the following health issues:    Follow up    Description (location/character/radiation):  Patient stats swelling down in feet, and the dizziness has gone away but patient stats he is still having shortness of breath   All.BAR Epps    The patient returns to clinic to address multiple concerns.    In the previous visit his blood glucose was significantly elevated in the 400s-500s.  The patient had recent ED presentation due to hyperglycemia.  In the previous visit his metformin dose was increased to 1000 mg 2 times daily. .  Advised to check his glucose 3-4 times per day and document it.  Blood glucose continued to be slightly elevated.    Also, the patient has a long history of chronic lymphedema in bilateral lower extremities.      Also, he has a long history of urinary retention with recent ED presentation.  He is currently taking Flomax.  The patient was seen and evaluated by urology during the hospital admission on December 24, 2018.  Urinary retention was attributed to recent changes in his pain medications.      Problem list and histories reviewed & adjusted, as indicated.  Additional history: as documented    Patient Active Problem List   Diagnosis     Obstructive sleep apnea     HYPERLIPIDEMIA LDL GOAL <100     Hypertension goal BP (blood pressure) < 140/90     Advanced directives, counseling/discussion     Migraine     History of diverticulitis     MENTAL HEALTH     Marianne (H)     Bipolar I disorder (H)     Chronic abdominal pain     Anxiety     Other amyloidosis (H)     Insomnia due to medical condition     Narcotic dependence (H)     Obstructive sleep apnea syndrome     Type 2 diabetes mellitus without complication, without long-term current use of insulin (H)     Restless legs syndrome (RLS)     Type 2 diabetes mellitus with other specified complication (H)     Peripheral edema     Morbid obesity (H)     Stasis  dermatitis of both legs     Past Surgical History:   Procedure Laterality Date     BONE MARROW BIOPSY, BONE SPECIMEN, NEEDLE/TROCAR N/A 6/8/2016    Procedure: BIOPSY BONE MARROW;  Surgeon: Nathan Agrawal MD;  Location:  GI     C NONSPECIFIC PROCEDURE  94    Cholecystectomy     C NONSPECIFIC PROCEDURE  2000    repair deviated septum     CHOLECYSTECTOMY  1995    lap qian     COLONOSCOPY  2012    hx polyps     ESOPHAGOSCOPY, GASTROSCOPY, DUODENOSCOPY (EGD), COMBINED N/A 5/29/2015    Procedure: COMBINED ESOPHAGOSCOPY, GASTROSCOPY, DUODENOSCOPY (EGD), BIOPSY SINGLE OR MULTIPLE;  Surgeon: John Jacob MD;  Location:  GI     HERNIA REPAIR, UMBILICAL  2006           Reviewed and updated as needed this visit by clinical staff  Allergies       Reviewed and updated as needed this visit by Provider         ROS:  Constitutional, HEENT, cardiovascular, pulmonary, gi and gu systems are negative, except as otherwise noted.    OBJECTIVE:     /81 (BP Location: Right arm, Patient Position: Sitting, Cuff Size: Adult Large)   Pulse 85   Temp 98.5  F (36.9  C) (Oral)   Wt 109.3 kg (241 lb 1 oz)   SpO2 96%   BMI 37.76 kg/m      Body mass index is 36.81 kg/m .  GENERAL: healthy, alert and no distress  RESP: lungs clear to auscultation - no rales, rhonchi or wheezes  CV: regular rate and rhythm, normal S1 S2, no S3 or S4, no murmur, click or rub, no peripheral edema and peripheral pulses strong  MS: No tenderness to palpation was noted in the left side of the rib cage.  +2 bilateral pitting edema.    Diagnostic Test Results:    Blood glucose = 444.  ASSESSMENT/PLAN:   1. Type 2 diabetes mellitus with other specified complication, without long-term current use of insulin (H)  Assessment: Long history of poorly controlled diabetes.  The patient had recent ED presentations with hyperglycemia.  Plan:  - Glucose, whole blood  Continue with checking blood glucose 3-4 times per day until appointment with  MTM.  -The patient has multiple allergies which includes Victoza and liraglutide  -Recent examination was done recently.  Advised to inform his optometrist to fax us the records.  Advised to continue with metformin 1000 mg twice daily.  Added Glipizide 5 mg once daily today.  - Glucose, whole blood  - Basic metabolic panel  - glipiZIDE (GLUCOTROL XL) 5 MG 24 hr tablet; Take 1 tablet (5 mg) by mouth daily  Dispense: 90 tablet; Refill: 3  - metFORMIN (GLUCOPHAGE-XR) 500 MG 24 hr tablet; Take 2 tablets (1,000 mg) by mouth 2 times daily (with meals)  Dispense: 120 tablet; Refill: 0    2. Lymphedema  Assessment: The patient has a long history of lymphedema.  Advised to continue with his follow-up at the lymphedema clinic.  Continue with torsemide to 40 mg once daily.  Plan:  - Basic metabolic panel  - torsemide (DEMADEX) 20 MG tablet; Take 2 tablets (40 mg) by mouth daily  Dispense: 120 tablet; Refill: 0  - tamsulosin (FLOMAX) 0.4 MG capsule; Take 0.4 mg by mouth daily  - LYMPHEDEMA THERAPY REFERRAL; Future  - order for DME; Equipment being ordered: Compression stockings    3. Urinary retention  Nonspecific urinary retention with few ED presentations.  Advised to continue with Flomax.  PSA was ordered today.  - tamsulosin (FLOMAX) 0.4 MG capsule; Take 0.4 mg by mouth daily    4.  Obstructive sleep apnea.     Schedule a follow-up appointment with MTM.  Patient was advised to present to the emergency room if he continues to have significantly elevated blood pressures.        Return to clinic after 1 week.    Vince Henry MD  [facility]  PAGER: [phone] or (W): [phone]

## 2018-12-31 NOTE — PATIENT INSTRUCTIONS
1. Start new medication - Glipizide 5mg once daily  2. Start new dose of Metformin 500mg ER (2 tablets) with lunch and another 2 tablets with Dinner.   3.  compression stockings from [facility] - [phone]  4 Start using the CPAP  5. Measure blood glucose Fasting, 2 hours after breakfast, 2 hours after lunch and 2 hours after dinner.   6. Schedule an appointment with MTM at [facility].

## 2018-12-31 NOTE — TELEPHONE ENCOUNTER
Reason for Call:  Other patient update    Detailed comments: patient went to [facility] and they told him that he soul wait until he completes his Lymphedema therapy before he gets fitted for a compression hose.    They are also sending the order back to clinic, because they need more information!    Phone Number Patient can be reached at: Home number on file [phone] (home)    Best Time: anytime    Can we leave a detailed message on this number? YES    Call taken on 12/31/2018 at 1:57 PM by Geeta Dallas  .

## 2019-01-02 ENCOUNTER — TELEPHONE (OUTPATIENT)
Dept: FAMILY MEDICINE | Facility: CLINIC | Age: 64
End: 2019-01-02

## 2019-01-02 ENCOUNTER — MYC MEDICAL ADVICE (OUTPATIENT)
Dept: FAMILY MEDICINE | Facility: CLINIC | Age: 64
End: 2019-01-02

## 2019-01-02 DIAGNOSIS — E11.9 TYPE 2 DIABETES MELLITUS WITHOUT COMPLICATION, WITHOUT LONG-TERM CURRENT USE OF INSULIN (H): Primary | ICD-10-CM

## 2019-01-02 NOTE — TELEPHONE ENCOUNTER
Received form(s) from [facility] medical supplies for order clarification for compression stockings.  Placed form(s) in/on Dr. Henry's desk.  Forms need to be filled out and signed and faxed to [phone]..    Call pt to verify form was sent: No  Copy needs to be sent for scanning after completion: Yes    Thank you, MARY Mensah, CMA

## 2019-01-03 ENCOUNTER — OFFICE VISIT (OUTPATIENT)
Dept: FAMILY MEDICINE | Facility: CLINIC | Age: 64
End: 2019-01-03
Payer: COMMERCIAL

## 2019-01-03 ENCOUNTER — NURSE TRIAGE (OUTPATIENT)
Dept: NURSING | Facility: CLINIC | Age: 64
End: 2019-01-03

## 2019-01-03 VITALS
DIASTOLIC BLOOD PRESSURE: 81 MMHG | HEART RATE: 89 BPM | SYSTOLIC BLOOD PRESSURE: 128 MMHG | TEMPERATURE: 98.3 F | HEIGHT: 67 IN | OXYGEN SATURATION: 95 % | RESPIRATION RATE: 16 BRPM | BODY MASS INDEX: 38.3 KG/M2 | WEIGHT: 244 LBS

## 2019-01-03 DIAGNOSIS — B34.9 VIRAL ILLNESS: Primary | ICD-10-CM

## 2019-01-03 DIAGNOSIS — R07.0 THROAT PAIN: ICD-10-CM

## 2019-01-03 LAB
DEPRECATED S PYO AG THROAT QL EIA: NORMAL
SPECIMEN SOURCE: NORMAL

## 2019-01-03 PROCEDURE — 99213 OFFICE O/P EST LOW 20 MIN: CPT | Performed by: PHYSICIAN ASSISTANT

## 2019-01-03 PROCEDURE — 87081 CULTURE SCREEN ONLY: CPT | Performed by: PHYSICIAN ASSISTANT

## 2019-01-03 PROCEDURE — 87880 STREP A ASSAY W/OPTIC: CPT | Performed by: PHYSICIAN ASSISTANT

## 2019-01-03 RX ORDER — GLUCOSAMINE HCL/CHONDROITIN SU 500-400 MG
CAPSULE ORAL
Qty: 100 EACH | Refills: 6 | Status: SHIPPED | OUTPATIENT
Start: 2019-01-03 | End: 2019-01-17

## 2019-01-03 ASSESSMENT — MIFFLIN-ST. JEOR: SCORE: 1865.41

## 2019-01-03 NOTE — PROGRESS NOTES
"  SUBJECTIVE:   Parmjit Hernández is a 63 year old male who presents to clinic today for the following health issues:      RESPIRATORY SYMPTOMS      Duration: a couple of days     Description  sore throat    Severity: moderate    Accompanying signs and symptoms: white spots in the back of throat     History (predisposing factors):  none    Precipitating or alleviating factors: None    Therapies tried and outcome:  none          Problem list and histories reviewed & adjusted, as indicated.  Additional history: 62 y/o male c/o not feeling well for the last 3 days.  Admits to the above symptoms with ST being the worst. He is feeling quite a bit better so far today.    BP Readings from Last 3 Encounters:   01/03/19 128/81   12/31/18 126/81   12/26/18 114/77    Wt Readings from Last 3 Encounters:   01/03/19 110.7 kg (244 lb)   12/31/18 109.3 kg (241 lb 1 oz)   12/26/18 111.8 kg (246 lb 6 oz)                    Reviewed and updated as needed this visit by clinical staff  Tobacco  Allergies  Meds       Reviewed and updated as needed this visit by Provider         ROS:  Constitutional, HEENT, cardiovascular, pulmonary, gi and gu systems are negative, except as otherwise noted.    OBJECTIVE:     /81   Pulse 89   Temp 98.3  F (36.8  C) (Oral)   Resp 16   Ht 1.702 m (5' 7\")   Wt 110.7 kg (244 lb)   SpO2 95%   BMI 38.22 kg/m    Body mass index is 38.22 kg/m .  GENERAL: alert and no distress  EYES: Eyes grossly normal to inspection  HENT: ear canals and TM's normal, nose and mouth without ulcers or lesions  NECK: no adenopathy, no asymmetry, masses, or scars and thyroid normal to palpation  RESP: lungs clear to auscultation - no rales, rhonchi or wheezes  CV: regular rate and rhythm, normal S1 S2, no S3 or S4, no murmur, click or rub,  PSYCH: mentation appears normal, affect normal/bright    Diagnostic Test Results:  Results for orders placed or performed in visit on 01/03/19 (from the past 24 hour(s))   Strep, " Rapid Screen   Result Value Ref Range    Specimen Description Throat     Rapid Strep A Screen       NEGATIVE: No Group A streptococcal antigen detected by immunoassay, await culture report.       ASSESSMENT/PLAN:             1. Viral illness  Supportive care.    2. Throat pain    - Strep, Rapid Screen  - Beta strep group A culture    Has follow up next week with PCP.    Rashad Rice PA-C  [facility]

## 2019-01-03 NOTE — NURSING NOTE
"Chief Complaint   Patient presents with     Pharyngitis     /81   Pulse 89   Temp 98.3  F (36.8  C) (Oral)   Resp 16   Ht 1.702 m (5' 7\")   Wt 110.7 kg (244 lb)   SpO2 95%   BMI 38.22 kg/m   Estimated body mass index is 38.22 kg/m  as calculated from the following:    Height as of this encounter: 1.702 m (5' 7\").    Weight as of this encounter: 110.7 kg (244 lb).  bp completed using cuff size: regular       Health Maintenance addressed:  NONE    n/a    Rebecca Elise MA     "

## 2019-01-03 NOTE — TELEPHONE ENCOUNTER
FS,   See below message  Pharmacy now calling requesting generic order for meter and lancets  Pended  Please authorize if appropriate.  Thanks,  Stacie MAYA RN

## 2019-01-03 NOTE — TELEPHONE ENCOUNTER
Reason for Call/Nurse Assessment:  62 y/o male calls about severe edema to up to his calves, some independent swelling of the knees, already took diuretic and wrapped calves, He said the swelling is known to his providers and her has a Lymphedema referral. He has no shortness of breath, no chest pain, hard but not painful to the touch.     RN Action/Disposiiton:  RN reviewed protocols and reviewed MD notes, considering this is not acute, advised follow up if he thinks it is worse than last seen by MD, otherwise, Home Care interventions of elevation meantime,which he is doing, but admits not well. Be diligent with elevation. RN advised to call back with any changes, worsening of symptoms, and questions or concerns.     Selam Gonsales RN - [place] Nurse Advisor  01/03/2019    Reason for Disposition    [1] MILD swelling of both ankles (i.e., pedal edema) AND [2] new onset or worsening    Additional Information    Negative: Severe difficulty breathing (e.g., struggling for each breath, speaks in single words)    Negative: Looks like a broken bone or dislocated joint (e.g., crooked or deformed)    Negative: Sounds like a life-threatening emergency to the triager    Negative: Difficulty breathing at rest    Negative: Entire foot is cool or blue in comparison to other side    Negative: [1] Difficulty breathing with exertion (e.g., walking) AND [2] new onset or worsening    Negative: [1] Red area or streak AND [2] fever    Negative: [1] Swelling is painful to touch AND [2] fever    Negative: [1] Cast on leg or ankle AND [2] now increased pain    Negative: Patient sounds very sick or weak to the triager    Negative: SEVERE leg swelling (e.g., swelling extends above knee, entire leg is swollen, weeping fluid)    Negative: [1] Red area or streak [2] large (> 2 in. or 5 cm)    Negative: [1] Thigh or calf pain AND [2] only 1 side AND [3] present > 1 hour    Negative: [1] Thigh, calf, or ankle swelling AND [2] only 1 side     Negative: [1] Thigh, calf, or ankle swelling AND [2] bilateral AND [3] 1 side is more swollen    Negative: [1] MODERATE leg swelling (e.g., swelling extends up to knees) AND [2] new onset or worsening    Negative: Swelling of face, arm or hands  (Exception: slight puffiness of fingers occurring during hot weather)    Negative: Looks like a boil, infected sore, deep ulcer or other infected rash (spreading redness, pus)    Protocols used: LEG SWELLING AND EDEMA-ADULT-AH

## 2019-01-03 NOTE — TELEPHONE ENCOUNTER
"Erickson is calling about edema that he has in calves knees and thighs and feet and hands.  Edema started years ago with stem cell therapy.  Erickson is schedule with edema clinic in [place].  Today symptom is scratchy throat.  Has a history of strep throat.  Noticed white pus in throat today.  Denies fever.      Reason for Disposition    [1] Pus on tonsils (back of throat) AND [2]  fever AND [3] swollen neck lymph nodes (\"glands\")    Additional Information    Negative: Severe difficulty breathing (e.g., struggling for each breath, speaks in single words, stridor)    Negative: Sounds like a life-threatening emergency to the triager    Negative: [1] Drooling or spitting out saliva (because can't swallow) AND [2] normal breathing    Negative: Unable to open mouth completely    Negative: [1] Difficulty breathing AND [2] not severe    Negative: Fever > 104 F (40 C)    Negative: [1] Refuses to drink anything AND [2] for > 12 hours    Negative: [1] Drinking very little AND [2] dehydration suspected (e.g., no urine > 12 hours, very dry mouth, very lightheaded)    Negative: Patient sounds very sick or weak to the triager    Negative: SEVERE (e.g., excruciating) throat pain    Protocols used: SORE THROAT-ADULT-      "

## 2019-01-03 NOTE — TELEPHONE ENCOUNTER
Dear Erickson,   Blood glucose of 425 is definitely lower than your previous blood glucose.  It is currently not in the desired ranges and will require some adjustment in order to bring it to our goal. (Fasting blood glucose goal is between , postprandial blood glucose goal is less than 180.)  It is important to keep that appointment with MTM to adjust your medications.     Take care  Vince Henry MD

## 2019-01-03 NOTE — TELEPHONE ENCOUNTER
Pharmacy calling.  They requested an order for a blood glucose meter pharmacy ok to substitute.  The acucheck that was ordered is not available.  They also need an order for lancets sent over    [facility] [address] [place] [address] AT [place] [id number] & [facility]

## 2019-01-04 ENCOUNTER — NURSE TRIAGE (OUTPATIENT)
Dept: NURSING | Facility: CLINIC | Age: 64
End: 2019-01-04

## 2019-01-04 LAB
BACTERIA SPEC CULT: NORMAL
SPECIMEN SOURCE: NORMAL

## 2019-01-04 NOTE — TELEPHONE ENCOUNTER
"Reason for Call/Nurse Assessment:  64 y/o male calls back 2nd night in a row about Edema to lower extremities. This is not new, swelling known to providers, has Lymphodema referral but can't get in until next week. Denies shortness of breath or chest pain, but he does report that the tightness to his leg feels greater and he has \"severe pain in legs. Confirmed her has been elevating above heart, confirmed he is wrapping as advised and taking diuretics.     RN Action/Disposiiton:  Reviewed protocols, since pain and swelling appear greater than when last seen and the pain is significant, he needs to be seen in the next 34 hours, if pain worsens or redness and inflammation, go to the ER, Patient verbalized understanding of and agreement with plan and had no further questions.     Selam Gonsales RN - [place] Nurse Advisor  01/03/2019   4:19AM        Reason for Disposition    [1] MODERATE leg swelling (e.g., swelling extends up to knees) AND [2] new onset or worsening    Additional Information    Negative: Severe difficulty breathing (e.g., struggling for each breath, speaks in single words)    Negative: Looks like a broken bone or dislocated joint (e.g., crooked or deformed)    Negative: Sounds like a life-threatening emergency to the triager    Negative: Difficulty breathing at rest    Negative: [1] Difficulty breathing with exertion (e.g., walking) AND [2] new onset or worsening    Negative: Entire foot is cool or blue in comparison to other side    Negative: [1] Can't walk or can barely walk AND [2] new onset    Negative: [1] Red area or streak AND [2] fever    Negative: [1] Swelling is painful to touch AND [2] fever    Negative: [1] Cast on leg or ankle AND [2] now increased pain    Negative: Patient sounds very sick or weak to the triager    Negative: SEVERE leg swelling (e.g., swelling extends above knee, entire leg is swollen, weeping fluid)    Negative: [1] Red area or streak [2] large (> 2 in. or 5 cm)    " Negative: [1] Thigh or calf pain AND [2] only 1 side AND [3] present > 1 hour    Negative: [1] Thigh, calf, or ankle swelling AND [2] only 1 side    Negative: [1] Thigh, calf, or ankle swelling AND [2] bilateral AND [3] 1 side is more swollen    Protocols used: LEG SWELLING AND EDEMA-ADULT-AH

## 2019-01-08 ENCOUNTER — MYC MEDICAL ADVICE (OUTPATIENT)
Dept: FAMILY MEDICINE | Facility: CLINIC | Age: 64
End: 2019-01-08

## 2019-01-09 ENCOUNTER — OFFICE VISIT (OUTPATIENT)
Dept: FAMILY MEDICINE | Facility: CLINIC | Age: 64
End: 2019-01-09
Payer: COMMERCIAL

## 2019-01-09 VITALS
TEMPERATURE: 97.7 F | BODY MASS INDEX: 36.88 KG/M2 | HEART RATE: 75 BPM | HEIGHT: 67 IN | WEIGHT: 235 LBS | DIASTOLIC BLOOD PRESSURE: 43 MMHG | RESPIRATION RATE: 14 BRPM | SYSTOLIC BLOOD PRESSURE: 84 MMHG | OXYGEN SATURATION: 99 %

## 2019-01-09 DIAGNOSIS — I89.0 LYMPHEDEMA: ICD-10-CM

## 2019-01-09 DIAGNOSIS — R33.9 URINARY RETENTION: ICD-10-CM

## 2019-01-09 DIAGNOSIS — E11.69 TYPE 2 DIABETES MELLITUS WITH OTHER SPECIFIED COMPLICATION, WITHOUT LONG-TERM CURRENT USE OF INSULIN (H): Primary | ICD-10-CM

## 2019-01-09 DIAGNOSIS — R07.81 RIB PAIN ON LEFT SIDE: ICD-10-CM

## 2019-01-09 DIAGNOSIS — I95.2 HYPOTENSION DUE TO DRUGS: ICD-10-CM

## 2019-01-09 DIAGNOSIS — N17.9 ACUTE RENAL FAILURE, UNSPECIFIED ACUTE RENAL FAILURE TYPE (H): ICD-10-CM

## 2019-01-09 LAB
ANION GAP SERPL CALCULATED.3IONS-SCNC: 8 MMOL/L (ref 3–14)
BUN SERPL-MCNC: 75 MG/DL (ref 7–30)
CALCIUM SERPL-MCNC: 8.2 MG/DL (ref 8.5–10.1)
CHLORIDE SERPL-SCNC: 106 MMOL/L (ref 94–109)
CO2 SERPL-SCNC: 22 MMOL/L (ref 20–32)
CREAT SERPL-MCNC: 1.67 MG/DL (ref 0.66–1.25)
GFR SERPL CREATININE-BSD FRML MDRD: 43 ML/MIN/{1.73_M2}
GLUCOSE BLD-MCNC: 116 MG/DL (ref 70–99)
GLUCOSE SERPL-MCNC: 102 MG/DL (ref 70–99)
POTASSIUM SERPL-SCNC: 4.7 MMOL/L (ref 3.4–5.3)
PSA SERPL-ACNC: 0.19 UG/L (ref 0–4)
SODIUM SERPL-SCNC: 136 MMOL/L (ref 133–144)

## 2019-01-09 PROCEDURE — 99214 OFFICE O/P EST MOD 30 MIN: CPT | Performed by: FAMILY MEDICINE

## 2019-01-09 PROCEDURE — 36415 COLL VENOUS BLD VENIPUNCTURE: CPT | Performed by: FAMILY MEDICINE

## 2019-01-09 PROCEDURE — 80048 BASIC METABOLIC PNL TOTAL CA: CPT | Performed by: FAMILY MEDICINE

## 2019-01-09 PROCEDURE — 82947 ASSAY GLUCOSE BLOOD QUANT: CPT | Performed by: FAMILY MEDICINE

## 2019-01-09 PROCEDURE — G0103 PSA SCREENING: HCPCS | Performed by: FAMILY MEDICINE

## 2019-01-09 RX ORDER — TORSEMIDE 20 MG/1
40 TABLET ORAL DAILY
Qty: 120 TABLET | Refills: 0
Start: 2019-01-09 | End: 2019-02-14

## 2019-01-09 RX ORDER — TAMSULOSIN HYDROCHLORIDE 0.4 MG/1
0.4 CAPSULE ORAL DAILY
COMMUNITY
End: 2019-02-14

## 2019-01-09 ASSESSMENT — MIFFLIN-ST. JEOR: SCORE: 1824.58
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
## 2019-01-14 ENCOUNTER — HOSPITAL ENCOUNTER (OUTPATIENT)
Dept: OCCUPATIONAL THERAPY | Facility: CLINIC | Age: 64
Setting detail: THERAPIES SERIES
End: 2019-01-14
Attending: FAMILY MEDICINE
Payer: COMMERCIAL

## 2019-01-14 ENCOUNTER — OFFICE VISIT (OUTPATIENT)
Dept: FAMILY MEDICINE | Facility: CLINIC | Age: 64
End: 2019-01-14
Payer: COMMERCIAL

## 2019-01-14 VITALS
BODY MASS INDEX: 36.58 KG/M2 | SYSTOLIC BLOOD PRESSURE: 108 MMHG | WEIGHT: 233.1 LBS | HEIGHT: 67 IN | DIASTOLIC BLOOD PRESSURE: 72 MMHG | HEART RATE: 80 BPM | TEMPERATURE: 96.7 F | OXYGEN SATURATION: 94 %

## 2019-01-14 DIAGNOSIS — N17.9 ACUTE RENAL FAILURE, UNSPECIFIED ACUTE RENAL FAILURE TYPE (H): ICD-10-CM

## 2019-01-14 DIAGNOSIS — G47.33 OBSTRUCTIVE SLEEP APNEA SYNDROME: ICD-10-CM

## 2019-01-14 DIAGNOSIS — I10 HYPERTENSION GOAL BP (BLOOD PRESSURE) < 140/90: ICD-10-CM

## 2019-01-14 DIAGNOSIS — I89.0 LYMPHEDEMA: ICD-10-CM

## 2019-01-14 DIAGNOSIS — E11.9 TYPE 2 DIABETES MELLITUS WITHOUT COMPLICATION, WITHOUT LONG-TERM CURRENT USE OF INSULIN (H): Primary | ICD-10-CM

## 2019-01-14 PROCEDURE — 99214 OFFICE O/P EST MOD 30 MIN: CPT | Performed by: FAMILY MEDICINE

## 2019-01-14 PROCEDURE — 97140 MANUAL THERAPY 1/> REGIONS: CPT | Mod: GO

## 2019-01-14 ASSESSMENT — MIFFLIN-ST. JEOR: SCORE: 1815.96

## 2019-01-14 NOTE — NURSING NOTE
"Chief Complaint   Patient presents with     Diabetes     Hyperlipidemia     Hypertension     /72   Pulse 80   Temp 96.7  F (35.9  C) (Oral)   Ht 1.702 m (5' 7\")   Wt 105.7 kg (233 lb 1.6 oz)   SpO2 94%   BMI 36.51 kg/m   Estimated body mass index is 36.51 kg/m  as calculated from the following:    Height as of this encounter: 1.702 m (5' 7\").    Weight as of this encounter: 105.7 kg (233 lb 1.6 oz).  Medication Reconciliation: complete      Health Maintenance that is potentially due pending provider review:  NONE    n/a    BAR Mcfarland  "

## 2019-01-14 NOTE — PROGRESS NOTES
SUBJECTIVE:   Parmjit Hernández is a 63 year old male who presents to clinic today for the following health issues:      Diabetes Follow-up    Patient is checking blood sugars: 1-2 times daily.  Results are as follows:         am - averaging 110's-150s         bedtime - averaging 150s    Diabetic concerns: None     Symptoms of hypoglycemia (low blood sugar): fatigue      Paresthesias (numbness or burning in feet) or sores: No     Date of last diabetic eye exam: approximately     Hyperlipidemia Follow-Up      Rate your low fat/cholesterol diet?: not monitoring fat    Taking statin?  Yes, no muscle aches from statin    Other lipid medications/supplements?:  none    Hypertension Follow-up      Outpatient blood pressures are not being checked.    Low Salt Diet: not monitoring salt    BP Readings from Last 2 Encounters:   01/14/19 108/72   01/09/19 (!) 84/43     Hemoglobin A1C (%)   Date Value   12/10/2018 5.3   05/16/2018 7.2 (H)     LDL Cholesterol Calculated (mg/dL)   Date Value   05/16/2018 27   06/23/2016 101     LDL Cholesterol Direct (mg/dL)   Date Value   05/25/2017 124 (H)       Amount of exercise or physical activity: None    Problems taking medications regularly: No    Medication side effects: none    Diet: regular (no restrictions)    Struggling with the CPAP. Fits right but it feels that it is suffocating him.     Decreased swelling, seeing basia at the lymphedema clinic. 4 days this week for lymphedema therapy.  Patient is concerned about pain from therapy.     In the previous visit his blood glucose was significantly elevated in the 400s-500s.  The patient had a recent ED admissions due to hyperglycemia.  In the previous visit his metformin dose was increased to 1000 mg 2 times daily and he was started on glipizide 5 mg once daily.  Advised to check his glucose 3-4 times per day undocumented.  Since last visit he reports significant improvement in his blood glucose.  Average blood glucose is in the  "130s-160s.  01/08/-161, 01/, 01/, 01/11155, 01/.  Postprandial 143,130, 203, 157, 159    Problem list and histories reviewed & adjusted, as indicated.  Additional history: as documented    Patient Active Problem List   Diagnosis     Obstructive sleep apnea     HYPERLIPIDEMIA LDL GOAL <100     Hypertension goal BP (blood pressure) < 140/90     Advanced directives, counseling/discussion     Migraine     History of diverticulitis     MENTAL HEALTH     Marianne (H)     Bipolar I disorder (H)     Chronic abdominal pain     Anxiety     Other amyloidosis (H)     Insomnia due to medical condition     Narcotic dependence (H)     Obstructive sleep apnea syndrome     Type 2 diabetes mellitus without complication, without long-term current use of insulin (H)     Restless legs syndrome (RLS)     Type 2 diabetes mellitus with other specified complication (H)     Peripheral edema     Morbid obesity (H)     Stasis dermatitis of both legs     Past Surgical History:   Procedure Laterality Date     BONE MARROW BIOPSY, BONE SPECIMEN, NEEDLE/TROCAR N/A 6/8/2016    Procedure: BIOPSY BONE MARROW;  Surgeon: Nathan Agrawal MD;  Location:  GI     C NONSPECIFIC PROCEDURE  94    Cholecystectomy     C NONSPECIFIC PROCEDURE  2000    repair deviated septum     CHOLECYSTECTOMY  1995    lap qian     COLONOSCOPY  2012    hx polyps     ESOPHAGOSCOPY, GASTROSCOPY, DUODENOSCOPY (EGD), COMBINED N/A 5/29/2015    Procedure: COMBINED ESOPHAGOSCOPY, GASTROSCOPY, DUODENOSCOPY (EGD), BIOPSY SINGLE OR MULTIPLE;  Surgeon: John Jacob MD;  Location:  GI     HERNIA REPAIR, UMBILICAL  2006           Reviewed and updated as needed this visit by clinical staff  Allergies       Reviewed and updated as needed this visit by Provider         ROS:   ROS: 10 point ROS neg other than the symptoms noted above in the HPI.    OBJECTIVE:     /72   Pulse 80   Temp 96.7  F (35.9  C) (Oral)   Ht 1.702 m (5' 7\")   Wt 105.7 kg " (233 lb 1.6 oz)   SpO2 94%   BMI 36.51 kg/m      GENERAL: healthy, alert and no distress  RESP: lungs clear to auscultation - no rales, rhonchi or wheezes  CV: regular rate and rhythm, normal S1 S2, no S3 or S4, no murmur, click or rub, no peripheral edema and peripheral pulses strong  MS: No tenderness to palpation was noted in the left side of the rib cage.  +2 bilateral pitting edema.    Diagnostic Test Results:none    ASSESSMENT/PLAN:   1. Type 2 diabetes mellitus with other specified complication, without long-term current use of insulin (H)  Assessment: Long history of poorly controlled diabetes.  The patient had multiple ED presentations with hyperglycemia.  Blood glucose has improved significantly over the past few visits.  Average blood glucose are between 130-151.  Plan:  - Glucose, whole blood  Continue with checking blood glucose 3-4 times per day until appointment with MTM.  -The patient has multiple allergies which includes Victoza and liraglutide    2. Lymphedema  Assessment: The patient has a long history of lymphedema.  Advised to continue with his follow-up at the lymphedema clinic.  Significant improvement in the swelling.  He continues to take furosemide 80 mg daily although he was advised to decrease the dose to 40 mg once daily.   Plan:  - Basic metabolic panel -repeated after 1 week.  -Again torsemide (DEMADEX) 20 MG tablet; Take 2 tablets (40 mg) by mouth daily  Dispense: 120 tablet; Refill: 0  - tamsulosin (FLOMAX) 0.4 MG capsule; Take 0.4 mg by mouth daily  -Advised on obtaining compression stockings.  And continue with his follow-up at the lymphedema clinic.    3.  Acute renal failure, unspecified acute renal failure type (H)  Fluctuating renal panel over the past 1 week.  Most recent   Renal panel shows acute renal injury.  Patient is due for repeat labs after 1 week.  - Basic metabolic panel    4. Obstructive sleep apnea syndrome  CPAP does not fit correctly.   The patient was advised  to schedule a visit with the sleep medicine clinic.  Most recent visit was in September 2017 and it was recommended that he schedules a follow-up visit 3 months from that day.  - SLEEP EVALUATION & MANAGEMENT REFERRAL - ADULT -[facility] [phone]  (Age 18 and up); Future    5. Hypertension goal BP (blood pressure) < 140/90  Blood pressure improved from last visit despite taking 80 mg of torsemide daily.  Emphasized over the importance of cutting it down to 40 mg once daily.    Because of the improvement in his blood glucose and decrease in the edema would recommend a follow-up visit after 1 month.  Patient was advised to continue with his follow-up appointment with MTM and lymphedema clinic.    Vince Henry MD  [facility]

## 2019-01-14 NOTE — PATIENT INSTRUCTIONS
1) Appointment with MTM  - 01/17/2018 - Bring meter  2) Chedule an appointment with sleep medicine.   3) Repeat labs after 1-2 weeks.

## 2019-01-15 ENCOUNTER — HOSPITAL ENCOUNTER (OUTPATIENT)
Dept: OCCUPATIONAL THERAPY | Facility: CLINIC | Age: 64
Setting detail: THERAPIES SERIES
End: 2019-01-15
Attending: FAMILY MEDICINE
Payer: COMMERCIAL

## 2019-01-15 PROCEDURE — 97140 MANUAL THERAPY 1/> REGIONS: CPT | Mod: GO | Performed by: OCCUPATIONAL THERAPIST

## 2019-01-16 ENCOUNTER — HOSPITAL ENCOUNTER (OUTPATIENT)
Dept: OCCUPATIONAL THERAPY | Facility: CLINIC | Age: 64
Setting detail: THERAPIES SERIES
End: 2019-01-16
Attending: FAMILY MEDICINE
Payer: COMMERCIAL

## 2019-01-16 PROCEDURE — 97140 MANUAL THERAPY 1/> REGIONS: CPT | Mod: GO

## 2019-01-16 PROCEDURE — 97535 SELF CARE MNGMENT TRAINING: CPT | Mod: GO

## 2019-01-16 NOTE — PROGRESS NOTES
"SUBJECTIVE/OBJECTIVE:                           Parmjit Hernández is a 63 year old male coming in for an initial visit for Medication Therapy Management.  He was referred to me from Dr. Henry.    Chief Complaint: Feels he's tired/fatigued all the time, intensity is new but has been going on for years. Pt is seeing Dr. Henry after our visit today and may get his hemoglobin checked as he's been low in the past.    Allergies/ADRs: Reviewed in Epic  Tobacco: No tobacco use  Alcohol: very rarely  Caffeine: no caffeine  Activity: minimal  PMH: Reviewed in Epic    Medication Adherence/Access:  no issues reported - pt reports being very good about taking all of his medications and not missing doses now. There was a period where he didn't take any medications for 6 months d/t psychological reasons but has not been an issue now.    Diabetes:  Pt currently taking metformin 1000mg BID and glipizide 5mg daily. Pt is not experiencing side effects.  SMB-3x/week.   Ranges (patient reported): 151 \"semi-fasting\" this AM, ranging about 120 for fasting and 150 for not fasting past couple of weeks  Patient is not experiencing hypoglycemia but did have an episode of extreme weakness/dizziness that he doesn't think is BG related.  Recent symptoms of high blood sugar? none  Eye exam: up to date  Foot exam: up to date  ACEi/ARB: Yes: lisinopril 20mg.   Urine Albumin:   Lab Results   Component Value Date    UMALCR 9.45 2018   Aspirin: Taking 81mg daily and denies side effects    Lab Results   Component Value Date    A1C 5.3 12/10/2018    A1C 7.2 2018    A1C 7.7 2018    A1C 7.3 2017    A1C 4.8 2017     Hypertension/Edema: Current medications include lisinopril 20mg daily and metoprolol tartrate 25mg once daily (although instructions on bottle says twice daily, he did not realize this). Patient does self-monitor BP. Home BP monitoring in range of 's systolic over 60-80's diastolic.  Patient reports " "tiredness over the past month and wonders if it's related to low BP  Currently not taking torsemide or potassium (only takes when he's on torsemide) as instructed by lymphedema clinic and he thinks he probably won't need this for a couple of weeks due to lymphedema evaluation. He reports he's previously had an angiogram which came out as \"perfectly clear.\"    BP Readings from Last 3 Encounters:   01/17/19 100/64   01/14/19 108/72   01/09/19 (!) 84/43     Hyperlipidemia: Current therapy includes atorvastatin 10mg once daily.  Pt reports no significant myalgias or other side effects.    Bipolar: Pt currently taking olanzapine 5mg daily, divalproex 1000mg daily, and cariprazine 3mg daily (is getting samples of this right now from psychiatrist). Pt not sure of any side effects he may have and thinks his regimen is effective. He states he's \"not a good  of how well the psych meds work.\" He's been on this regimen for a long time b/c he's tried many different medications for mental health and seems stable on this regimen. However, now is working with his psychiatrist, Jose F Rahman, to get off of olanzapine and divalproex due to possible side effects.    Insomnia: Pt hasn't started melatonin 5mg or trazodone 50-200mg HS yet but just picked up (recently prescribed by Dr. Rahman). He reports trouble with falling asleep. He also notes he has an appointment coming up with the sleep center. He can't wear his CPAP mask currently b/c \"it's suffocating.\"    BPH:  Current medications include: none. Pt was instructed to hold tamsulosin 0.4mg due to lymphedema evaluation program. Pt notes he doesn't think he's benefiting from tamsulosin anyway and may not need this medication.    Headache/Pain: Pt takes acetaminophen 325mg PRN (rarely needs) and ibuprofen 200mg PRN (needs 2-3x daily). He also takes pregabalin 50mg TID for neuropathy pain. Pt reports no side effects. He has not needed his Imitrex for headache pain since " August.    Allergies: Pt takes loratadine 10mg daily and feels this is effective for his runny nose. Reports no side effects.    Today's Vitals: /64   Wt 233 lb 3.2 oz (105.8 kg)   BMI 36.52 kg/m        ASSESSMENT:                             Current medications were reviewed today.     Medication Adherence: no issues identified    Diabetes: Stable. Patient is meeting A1c goal of < 7%.    Hypertension/Edema: Needs Improvement. Metoprolol may be contributing to pt's fatigue and with lower BP readings recently, he may benefit from stopping metoprolol.    Hyperlipidemia: Stable.      Bipolar: Plan in place to decrease olanzapine dose with psychiatrist and slowly get pt off of olanzapine and divalproex. Both medications have adverse effects of weakness and tiredness which may be contributing to his weakness and tiredness complaints. He has been on these medications for a long time but also reports having weakness and tiredness for a long time.    Insomnia: Plan in place to try melatonin and trazodone.    BPH: Stable    Headache/Pain: Stable    Allergies: Stable    PLAN:                            1. Pt to stop metoprolol.  2. Continue with plan of starting melatonin and trazodone PRN. Continue to work with psychiatrist to decrease olanzapine and divalproex.    I spent 45 minutes with this patient today. All changes were made via verbal approval with Vince Henry. A copy of the visit note was provided to the patient's primary care provider.    Will follow up in 2-3 months.    The patient was given a summary of these recommendations as an after visit summary.     Arlen Smalls PharmD  Medication Therapy Management Resident, [facility]  Pager: [phone]    Preceptor cosignature: Parmjit Hernández was seen independently by Dr. Smalls. I have reviewed the assessment and plan. Minda Smith, GabinoD, TOSHIA, BCACP

## 2019-01-17 ENCOUNTER — OFFICE VISIT (OUTPATIENT)
Dept: FAMILY MEDICINE | Facility: CLINIC | Age: 64
End: 2019-01-17
Payer: COMMERCIAL

## 2019-01-17 ENCOUNTER — OFFICE VISIT (OUTPATIENT)
Dept: PHARMACY | Facility: CLINIC | Age: 64
End: 2019-01-17

## 2019-01-17 ENCOUNTER — HOSPITAL ENCOUNTER (OUTPATIENT)
Dept: OCCUPATIONAL THERAPY | Facility: CLINIC | Age: 64
Setting detail: THERAPIES SERIES
End: 2019-01-17
Attending: FAMILY MEDICINE
Payer: COMMERCIAL

## 2019-01-17 VITALS
OXYGEN SATURATION: 96 % | SYSTOLIC BLOOD PRESSURE: 123 MMHG | DIASTOLIC BLOOD PRESSURE: 74 MMHG | RESPIRATION RATE: 16 BRPM | BODY MASS INDEX: 36.52 KG/M2 | HEART RATE: 84 BPM | TEMPERATURE: 97.5 F | WEIGHT: 233.2 LBS

## 2019-01-17 VITALS — WEIGHT: 233.2 LBS | BODY MASS INDEX: 36.52 KG/M2 | SYSTOLIC BLOOD PRESSURE: 100 MMHG | DIASTOLIC BLOOD PRESSURE: 64 MMHG

## 2019-01-17 DIAGNOSIS — G47.01 INSOMNIA DUE TO MEDICAL CONDITION: ICD-10-CM

## 2019-01-17 DIAGNOSIS — N40.0 BENIGN PROSTATIC HYPERPLASIA, UNSPECIFIED WHETHER LOWER URINARY TRACT SYMPTOMS PRESENT: ICD-10-CM

## 2019-01-17 DIAGNOSIS — E85.89 OTHER AMYLOIDOSIS (H): ICD-10-CM

## 2019-01-17 DIAGNOSIS — E78.5 HYPERLIPIDEMIA LDL GOAL <100: ICD-10-CM

## 2019-01-17 DIAGNOSIS — E11.69 TYPE 2 DIABETES MELLITUS WITH OTHER SPECIFIED COMPLICATION, WITHOUT LONG-TERM CURRENT USE OF INSULIN (H): Primary | ICD-10-CM

## 2019-01-17 DIAGNOSIS — D64.9 ANEMIA, UNSPECIFIED TYPE: Primary | ICD-10-CM

## 2019-01-17 DIAGNOSIS — G47.33 OBSTRUCTIVE SLEEP APNEA SYNDROME: ICD-10-CM

## 2019-01-17 DIAGNOSIS — I10 HYPERTENSION GOAL BP (BLOOD PRESSURE) < 140/90: ICD-10-CM

## 2019-01-17 DIAGNOSIS — R60.0 PERIPHERAL EDEMA: ICD-10-CM

## 2019-01-17 DIAGNOSIS — H10.45 CHRONIC ALLERGIC CONJUNCTIVITIS: ICD-10-CM

## 2019-01-17 DIAGNOSIS — N17.9 AKI (ACUTE KIDNEY INJURY) (H): ICD-10-CM

## 2019-01-17 DIAGNOSIS — R53.82 CHRONIC FATIGUE: ICD-10-CM

## 2019-01-17 DIAGNOSIS — G43.919 INTRACTABLE MIGRAINE WITHOUT STATUS MIGRAINOSUS, UNSPECIFIED MIGRAINE TYPE: ICD-10-CM

## 2019-01-17 DIAGNOSIS — F31.9 BIPOLAR I DISORDER (H): ICD-10-CM

## 2019-01-17 LAB
BASOPHILS # BLD AUTO: 0 10E9/L (ref 0–0.2)
BASOPHILS NFR BLD AUTO: 0.3 %
DIFFERENTIAL METHOD BLD: ABNORMAL
EOSINOPHIL # BLD AUTO: 0.4 10E9/L (ref 0–0.7)
EOSINOPHIL NFR BLD AUTO: 6.2 %
ERYTHROCYTE [DISTWIDTH] IN BLOOD BY AUTOMATED COUNT: 14.9 % (ref 10–15)
HCT VFR BLD AUTO: 29.3 % (ref 40–53)
HGB BLD-MCNC: 9.3 G/DL (ref 13.3–17.7)
LYMPHOCYTES # BLD AUTO: 0.9 10E9/L (ref 0.8–5.3)
LYMPHOCYTES NFR BLD AUTO: 15.2 %
MCH RBC QN AUTO: 32.7 PG (ref 26.5–33)
MCHC RBC AUTO-ENTMCNC: 31.7 G/DL (ref 31.5–36.5)
MCV RBC AUTO: 103 FL (ref 78–100)
MONOCYTES # BLD AUTO: 0.4 10E9/L (ref 0–1.3)
MONOCYTES NFR BLD AUTO: 6.4 %
NEUTROPHILS # BLD AUTO: 4.4 10E9/L (ref 1.6–8.3)
NEUTROPHILS NFR BLD AUTO: 71.9 %
PLATELET # BLD AUTO: 170 10E9/L (ref 150–450)
RBC # BLD AUTO: 2.84 10E12/L (ref 4.4–5.9)
RETICS # AUTO: 96.6 10E9/L (ref 25–95)
RETICS/RBC NFR AUTO: 3.2 % (ref 0.5–2)
VIT B12 SERPL-MCNC: 573 PG/ML (ref 193–986)
WBC # BLD AUTO: 6.1 10E9/L (ref 4–11)

## 2019-01-17 PROCEDURE — 85025 COMPLETE CBC W/AUTO DIFF WBC: CPT | Performed by: FAMILY MEDICINE

## 2019-01-17 PROCEDURE — 82607 VITAMIN B-12: CPT | Performed by: FAMILY MEDICINE

## 2019-01-17 PROCEDURE — 36415 COLL VENOUS BLD VENIPUNCTURE: CPT | Performed by: FAMILY MEDICINE

## 2019-01-17 PROCEDURE — 99605 MTMS BY PHARM NP 15 MIN: CPT | Performed by: PHARMACIST

## 2019-01-17 PROCEDURE — 85060 BLOOD SMEAR INTERPRETATION: CPT | Performed by: FAMILY MEDICINE

## 2019-01-17 PROCEDURE — 85045 AUTOMATED RETICULOCYTE COUNT: CPT | Performed by: FAMILY MEDICINE

## 2019-01-17 PROCEDURE — 97140 MANUAL THERAPY 1/> REGIONS: CPT | Mod: GO | Performed by: OCCUPATIONAL THERAPIST

## 2019-01-17 PROCEDURE — 80053 COMPREHEN METABOLIC PANEL: CPT | Performed by: FAMILY MEDICINE

## 2019-01-17 PROCEDURE — 99607 MTMS BY PHARM ADDL 15 MIN: CPT | Performed by: PHARMACIST

## 2019-01-17 PROCEDURE — 83010 ASSAY OF HAPTOGLOBIN QUANT: CPT | Performed by: FAMILY MEDICINE

## 2019-01-17 PROCEDURE — 99214 OFFICE O/P EST MOD 30 MIN: CPT | Performed by: FAMILY MEDICINE

## 2019-01-17 PROCEDURE — 82728 ASSAY OF FERRITIN: CPT | Performed by: FAMILY MEDICINE

## 2019-01-17 PROCEDURE — 97535 SELF CARE MNGMENT TRAINING: CPT | Mod: GO | Performed by: OCCUPATIONAL THERAPIST

## 2019-01-17 RX ORDER — OLANZAPINE 5 MG/1
5 TABLET ORAL AT BEDTIME
COMMUNITY
End: 2019-02-27

## 2019-01-17 RX ORDER — TRAZODONE HYDROCHLORIDE 100 MG/1
50-200 TABLET ORAL AT BEDTIME
COMMUNITY
End: 2019-02-27

## 2019-01-17 RX ORDER — IBUPROFEN 200 MG
200 TABLET ORAL EVERY 6 HOURS PRN
COMMUNITY
End: 2019-02-28

## 2019-01-17 RX ORDER — LORATADINE 10 MG/1
10 TABLET ORAL DAILY
COMMUNITY
End: 2019-02-14

## 2019-01-17 NOTE — PATIENT INSTRUCTIONS
Recommendations from today's MTM visit:                                                    MTM (medication therapy management) is a service provided by a clinical pharmacist designed to help you get the most of out of your medicines.   Today we reviewed what your medicines are for, how to know if they are working, that your medicines are safe and how to make your medicine regimen as easy as possible.     1. Will talk to Dr. Henry about stopping your metoprolol.    To schedule another MTM appointment, please call the clinic directly or you may call the MTM scheduling line at [phone] or toll-free at [phone].     My Clinical Pharmacist's contact information:                                                      It was a pleasure talking with you today!  Please feel free to contact me with any questions or concerns you have.      Arlen Smalls, PharmD  Medication Therapy Management Resident, [facility]  Pager: [phone]    You may receive a survey about the MTM services you received.  I would appreciate your feedback to help me serve you better in the future. Please fill it out and return it when you can. Your comments will be anonymous.

## 2019-01-17 NOTE — NURSING NOTE
"Chief Complaint   Patient presents with     Blood Draw     /74 (BP Location: Right arm, Patient Position: Sitting, Cuff Size: Adult Large)   Pulse 84   Temp 97.5  F (36.4  C) (Oral)   Resp 16   Wt 105.8 kg (233 lb 3.2 oz)   SpO2 96%   BMI 36.52 kg/m   Estimated body mass index is 36.52 kg/m  as calculated from the following:    Height as of 1/14/19: 1.702 m (5' 7\").    Weight as of this encounter: 105.8 kg (233 lb 3.2 oz).  bp completed using cuff size: large       Health Maintenance addressed:  NONE    n/a    Tomi Licea MA     "

## 2019-01-17 NOTE — PROGRESS NOTES
SUBJECTIVE:   Parmjit Hernández is a 63 year old male who presents to clinic today for the following health issues:      Would like to get hemoglobin check as patient has chronic fatigue.      The patient presents to clinic for evaluation of anemia.  He was seen by his psychiatrist yesterday and he it was recommended that he schedules a follow-up visit here with us.  Currently he reports excessive fatigue excessive daytime drowsiness.  He denies any bleeding in the urine.  Denies any bleeding per rectum.  Medical history significant for amyloidosis diagnosed in 2014.  The patient underwent chemotherapy and routine follow-up at the [facility].    Problem list and histories reviewed & adjusted, as indicated.  Additional history: as documented    Patient Active Problem List   Diagnosis     Obstructive sleep apnea     HYPERLIPIDEMIA LDL GOAL <100     Hypertension goal BP (blood pressure) < 140/90     Advanced directives, counseling/discussion     Migraine     History of diverticulitis     MENTAL HEALTH     Marianne (H)     Bipolar I disorder (H)     Chronic abdominal pain     Anxiety     Other amyloidosis (H)     Insomnia due to medical condition     Narcotic dependence (H)     Obstructive sleep apnea syndrome     Type 2 diabetes mellitus without complication, without long-term current use of insulin (H)     Restless legs syndrome (RLS)     Type 2 diabetes mellitus with other specified complication (H)     Peripheral edema     Morbid obesity (H)     Stasis dermatitis of both legs     Past Surgical History:   Procedure Laterality Date     BONE MARROW BIOPSY, BONE SPECIMEN, NEEDLE/TROCAR N/A 6/8/2016    Procedure: BIOPSY BONE MARROW;  Surgeon: Nathan Agrawal MD;  Location:  GI     C NONSPECIFIC PROCEDURE  94    Cholecystectomy     C NONSPECIFIC PROCEDURE  2000    repair deviated septum     CHOLECYSTECTOMY  1995    lap qian     COLONOSCOPY  2012    hx polyps     ESOPHAGOSCOPY, GASTROSCOPY, DUODENOSCOPY (EGD),  COMBINED N/A 5/29/2015    Procedure: COMBINED ESOPHAGOSCOPY, GASTROSCOPY, DUODENOSCOPY (EGD), BIOPSY SINGLE OR MULTIPLE;  Surgeon: John Jacob MD;  Location: [facility]     HERNIA REPAIR, UMBILICAL  2006       Social History     Tobacco Use     Smoking status: Never Smoker     Smokeless tobacco: Never Used   Substance Use Topics     Alcohol use: No     Alcohol/week: 0.0 oz     Family History   Problem Relation Age of Onset     Cerebrovascular Disease Mother      C.A.D. Mother      Hypertension Mother      Alcohol/Drug Mother      Arthritis Mother      Cerebrovascular Disease Maternal Grandmother      C.A.D. Maternal Grandmother      Alcohol/Drug Maternal Grandmother      C.A.D. Father      Heart Disease Father      Hypertension Father      Alcohol/Drug Father      Allergies Father      Circulatory Father      Depression Father      Respiratory Father      Asthma Father      Alcohol/Drug Paternal Grandfather      Cerebrovascular Disease Paternal Grandfather      Depression Son      Psychotic Disorder Son         anxiety disorder     Depression Daughter      Cerebrovascular Disease Maternal Grandfather      Cerebrovascular Disease Paternal Grandmother      Diabetes Brother      Allergies Sister      Depression Sister      Gynecology Sister      Coronary Artery Disease No family hx of      Hyperlipidemia No family hx of      Breast Cancer No family hx of      Colon Cancer No family hx of      Prostate Cancer No family hx of      Other Cancer No family hx of      Anxiety Disorder No family hx of      Mental Illness No family hx of      Substance Abuse No family hx of      Anesthesia Reaction No family hx of      Osteoporosis No family hx of      Genetic Disorder No family hx of      Thyroid Disease No family hx of      Obesity No family hx of      Unknown/Adopted No family hx of            Reviewed and updated as needed this visit by clinical staff  Tobacco  Allergies  Meds       Reviewed and updated as needed  this visit by Provider         ROS:  Constitutional, HEENT, cardiovascular, pulmonary, gi and gu systems are negative, except as otherwise noted.    OBJECTIVE:     /74 (BP Location: Right arm, Patient Position: Sitting, Cuff Size: Adult Large)   Pulse 84   Temp 97.5  F (36.4  C) (Oral)   Resp 16   Wt 105.8 kg (233 lb 3.2 oz)   SpO2 96%   BMI 36.52 kg/m    Body mass index is 36.52 kg/m .  GENERAL: healthy, alert and no distress  RESP: lungs clear to auscultation - no rales, rhonchi or wheezes  CV: regular rate and rhythm, normal S1 S2, no S3 or S4, no murmur, click or rub, no peripheral edema and peripheral pulses strong  MS: +2 pitting edema up to the knees.  Significant improvement since last visit.    Diagnostic Test Results:  No results found for this or any previous visit (from the past 24 hour(s)).    ASSESSMENT/PLAN:       ICD-10-CM    1. Anemia, unspecified type D64.9 Haptoglobin     Vitamin B12     Ferritin     Reticulocyte count     Blood Morphology Pathologist Review     CBC with platelets differential     Comprehensive metabolic panel   2. Chronic fatigue R53.82 Vitamin B12     Ferritin     CBC with platelets differential   3. Other amyloidosis (H) E85.89    4. Peripheral edema R60.9    5. Obstructive sleep apnea syndrome G47.33    6. KWABENA (acute kidney injury) (H) N17.9 Comprehensive metabolic panel       Assessment: Notes from previous provider states that the type of anemia is hemolytic anemia.  Complete blood counts from the past 4 years were reviewed.  The patient was noted to have a stable hemoglobin prior to his chemotherapy treatment for amyloidosis in 2014.  His hemoglobin continued to decline over the past 3 years had intermittent improvement in his hemoglobin..  In September 2018 his hemoglobin was documented as 13.9.  It has been stable but low over the past 6 months.  The pa/tient also underwent a/ colonoscopy which shows 3 polyps.  He is not due for another colonoscopy until  08/01/2021.   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  With follow-up evaluation showing complete hematological response.  In today's visit, basic hematological panel was obtained.  Patient was advised to schedule an appointment with his hematologist for follow-up visit.  Plan:  - Haptoglobin  - Vitamin B12  - Ferritin  - Reticulocyte count  - Blood Morphology Pathologist Review  - CBC with platelets differential  - Comprehensive metabolic panel      In terms of the fatigue, several differential diagnosis comes into mind.  Obstructive sleep apnea, anemia and vitamin deficiency can cause fatigue.  The patient expressed interest in starting a stimulating medication to help with excessive daytime drowsiness.  It was recommended that he schedules a follow-up with hematology and schedule follow-up with sleep medicine prior to reinitiating the discussion regarding stimulating medications.  Plus, the patient is has an implantable fentanyl pump which can cause fatigue and excessive drowsiness as well.    In terms of the edema, significant improvement since last visit.  Patient was advised to continue to hold the furosemide.    Acute kidney injury and his most recent lab likely secondary to diuresis.  Repeat labs done today.  Finally, the patient had a visit with his psychiatrist who is currently working with the patient in order to discontinue the Depakote and start trazodone instead.      Vince Henry MD  [facility]

## 2019-01-18 LAB
ALBUMIN SERPL-MCNC: 3.3 G/DL (ref 3.4–5)
ALP SERPL-CCNC: 86 U/L (ref 40–150)
ALT SERPL W P-5'-P-CCNC: 15 U/L (ref 0–70)
ANION GAP SERPL CALCULATED.3IONS-SCNC: 11 MMOL/L (ref 3–14)
AST SERPL W P-5'-P-CCNC: 9 U/L (ref 0–45)
BILIRUB SERPL-MCNC: 0.3 MG/DL (ref 0.2–1.3)
BUN SERPL-MCNC: 26 MG/DL (ref 7–30)
CALCIUM SERPL-MCNC: 8.9 MG/DL (ref 8.5–10.1)
CHLORIDE SERPL-SCNC: 107 MMOL/L (ref 94–109)
CO2 SERPL-SCNC: 22 MMOL/L (ref 20–32)
COPATH REPORT: NORMAL
CREAT SERPL-MCNC: 0.9 MG/DL (ref 0.66–1.25)
FERRITIN SERPL-MCNC: 98 NG/ML (ref 26–388)
GFR SERPL CREATININE-BSD FRML MDRD: >90 ML/MIN/{1.73_M2}
GLUCOSE SERPL-MCNC: 142 MG/DL (ref 70–99)
HAPTOGLOB SERPL-MCNC: 138 MG/DL (ref 35–175)
POTASSIUM SERPL-SCNC: 4.1 MMOL/L (ref 3.4–5.3)
PROT SERPL-MCNC: 6.2 G/DL (ref 6.8–8.8)
SODIUM SERPL-SCNC: 140 MMOL/L (ref 133–144)

## 2019-01-21 ENCOUNTER — HOSPITAL ENCOUNTER (OUTPATIENT)
Dept: OCCUPATIONAL THERAPY | Facility: CLINIC | Age: 64
Setting detail: THERAPIES SERIES
End: 2019-01-21
Attending: FAMILY MEDICINE
Payer: COMMERCIAL

## 2019-01-21 PROCEDURE — 97535 SELF CARE MNGMENT TRAINING: CPT | Mod: GO | Performed by: OCCUPATIONAL THERAPIST

## 2019-01-21 PROCEDURE — 97140 MANUAL THERAPY 1/> REGIONS: CPT | Mod: GO | Performed by: OCCUPATIONAL THERAPIST

## 2019-01-23 ENCOUNTER — HOSPITAL ENCOUNTER (OUTPATIENT)
Dept: OCCUPATIONAL THERAPY | Facility: CLINIC | Age: 64
Setting detail: THERAPIES SERIES
End: 2019-01-23
Attending: FAMILY MEDICINE
Payer: COMMERCIAL

## 2019-01-23 PROCEDURE — 97140 MANUAL THERAPY 1/> REGIONS: CPT | Mod: GO

## 2019-01-24 ENCOUNTER — TELEPHONE (OUTPATIENT)
Dept: FAMILY MEDICINE | Facility: CLINIC | Age: 64
End: 2019-01-24

## 2019-01-24 ENCOUNTER — HOSPITAL ENCOUNTER (OUTPATIENT)
Dept: OCCUPATIONAL THERAPY | Facility: CLINIC | Age: 64
Setting detail: THERAPIES SERIES
End: 2019-01-24
Attending: FAMILY MEDICINE
Payer: COMMERCIAL

## 2019-01-24 DIAGNOSIS — D59.8 OTHER ACQUIRED HEMOLYTIC ANEMIAS (H): ICD-10-CM

## 2019-01-24 DIAGNOSIS — G43.709 CHRONIC MIGRAINE WITHOUT AURA WITHOUT STATUS MIGRAINOSUS, NOT INTRACTABLE: Primary | ICD-10-CM

## 2019-01-24 LAB — FOLATE SERPL-MCNC: 8.6 NG/ML

## 2019-01-24 PROCEDURE — 36415 COLL VENOUS BLD VENIPUNCTURE: CPT | Performed by: FAMILY MEDICINE

## 2019-01-24 PROCEDURE — 82746 ASSAY OF FOLIC ACID SERUM: CPT | Performed by: FAMILY MEDICINE

## 2019-01-24 PROCEDURE — 97140 MANUAL THERAPY 1/> REGIONS: CPT | Mod: GO | Performed by: OCCUPATIONAL THERAPIST

## 2019-01-24 RX ORDER — RIZATRIPTAN BENZOATE 5 MG/1
5-10 TABLET ORAL
Qty: 30 TABLET | Refills: 0 | Status: SHIPPED | OUTPATIENT
Start: 2019-01-24 | End: 2019-01-24

## 2019-01-24 NOTE — TELEPHONE ENCOUNTER
Only need the Maxalt & sig in Epic for the Rx list.    The application for Maxalt thru [employer] is part of the application. I will send to Dr Henry for review & signature when I've completed.    Barb Huerta  Prescription

## 2019-01-24 NOTE — TELEPHONE ENCOUNTER
Jan 23, 2019  I spoke with Erickson, he is in need of financial assistance for medication.    We reviewed the Pharmacy Assistance Fund $500, the Prescription Assistance Program for manfacturer brand name assistance programs, gross income, insurance and Rx list.    Dr. Henry- Imitrex/Sumatriptan is on Erickson's med list.  There are no assistance programs for that medication.  [employer] does offer a program for Maxalt that Erickson should qualify for.  If Maxalt would be an appropriate alternative for Imitrex, I need the sig for the application.     assistance program, applications will be completed for: Zyprexa, Lyrica (& Maxalt).    Thanks so much for your help!    Barb Huerta  Prescription

## 2019-01-24 NOTE — TELEPHONE ENCOUNTER
FS  Please see below message from pharmacy, sorry encounter accidentally closed.  Did you want to send in Rx for Maxalt?  Thank you,  Anca Dallas RN

## 2019-01-24 NOTE — TELEPHONE ENCOUNTER
Felicia,  Here is the prescription sig that is in Epic.Take 1-2 tablets (5-10 mg) by mouth at onset of headache for migraine Max 30 mg per 24 hours - Oral    Can the pt just not  the prescription? Wouldn't he need the Rx anyway in Epic?    Thanks,  SERGEY March

## 2019-01-24 NOTE — TELEPHONE ENCOUNTER
Recall the prescription!  I only need information at this time.  He cannot afford the Maxalt!    The prescription is part of the application.  What would th sig be so I can complete the application?    Barb Huerta  Prescription

## 2019-01-25 ENCOUNTER — TELEPHONE (OUTPATIENT)
Dept: FAMILY MEDICINE | Facility: CLINIC | Age: 64
End: 2019-01-25

## 2019-01-25 ENCOUNTER — HOSPITAL ENCOUNTER (OUTPATIENT)
Dept: OCCUPATIONAL THERAPY | Facility: CLINIC | Age: 64
Setting detail: THERAPIES SERIES
End: 2019-01-25
Attending: FAMILY MEDICINE
Payer: COMMERCIAL

## 2019-01-25 PROCEDURE — 97140 MANUAL THERAPY 1/> REGIONS: CPT | Mod: GO | Performed by: OCCUPATIONAL THERAPIST

## 2019-01-25 NOTE — PROGRESS NOTES
"   01/25/19 6629   Signing Clinician's Name / Credentials   Signing clinician's name / credentials Emili Hanley, OTR/L CLT-DAVE  (DISCHARGE NOTE)   Session Number   Session Number 9   Progress/Recertification   Recertification Due 03/20/19   Goal 1   Goal identifier BBK volume   Goal description For decreased risk infection, skin breakdown/wounds & progressive soft-tissue fibrosis volume will be reduced at least 300ml in ea LE BK.   Target date 02/01/19   Date met 01/25/19  (Goal exceeded)   Goal 2   Goal identifier GCB   Goal description To reduce volume of lymphedema and soft-tissue fibrosis pt will tolerate up to 23hr/day wear gradient compression bandaging (GCB) BBK.   Target date 02/01/19   Date met 01/25/19  (Goal met)   Goal 3   Goal identifier Home program   Goal description For long-term home mgmt chronic lymphedema pt/caregiver independent in home program a. GCB/GCB alternative garment for night wear b. compression garment for day wear c. ex to incr lymph flow/self-MLD.   Target date 02/01/19   Date met (Goal not met: plz see \"comments\" below)   Goal 4   Goal identifier Lymphedema Precautions   Goal description For decreased risk infection, skin breakdown/wounds, and progressive soft-tissue fibrosis patient will verbalize understanding re lymphedema precautions, and options to treat lymphedema.   Target date 02/01/19   Date met 01/25/19  (Goal met)   Subjective Report   Subjective Report \"I think we've made a lot of progress. My legs are way down and my calves are not sore.\"   Objective Measure 1   Objective Measure BBK volume   Details vs 1.14.19 RBK decr 519ml & LBK decr 638ml   System Outcome Measures   Lymphedema Life Impact Scale (score range 0-72). A higher score indicates greater impairment. 19   Manual Therapy   Manual Therapy Minutes (90153) 75   Skilled Intervention Msmts, MLD, GCB   Patient Response Pt pleased with reduction BBK volume & decr pain.   Treatment Detail Took BBK msmts (see " "\"Objective Measure 1\" above for details). MLD B terminus, B CERV/clav LN, B ANT IC LN/Resendiz's reflexes, B parasternal LN, TDL/B lumbar trunks, B ING/FV LN, skin drained BLE to B ING/FV mostly focused on thighs d/t firm/fibrotic bulges medial.  BBK GCB applied: Leanna Clarence stockinette, 6cm Transelast folded in   for toebands, 10cm Leanna Clarence Gentle Pad to pad feet/ankles, 10cm Rosidal soft foam to pad ankles to knees, 6cm x 5m Rosidal short-stretch bandages (SSB) to compress feet, 8cm x 5m SSB to compress heels/ankles applied with ankles in flexion, 10cm x 5m SSB to compress ankles to knees.   Progress Met goals #1, 2, 4   Plan   Homework Pt has garment fitting appt Monday 1.28.19 at [facility]   Home program BBK quick wrap with roll foam & 2 x SSBs vs velcro-strap compression binders night wear, day wear BBK compression socks however pt has not yet obtained these garments.   Updates to plan of care Discharge Edema Treatment to . Pt declines f/u citing feeling overwhelmed by multi medical issues & appts.    Comments   Comments Pt states he understands the importance of managing lymphedema, however, \"I really don't want to do the home program.\"    Total Session Time   Timed Code Treatment Minutes 75   Total Treatment Time (sum of timed and untimed services) 75     "

## 2019-01-25 NOTE — TELEPHONE ENCOUNTER
Reason for Call: Request for an order or referral:    Order or referral being requested: [facility] is faxing over a request for an order for Compression Stockings  Faxed it to Fax  [phone] and will fax another one now     Date needed: as soon as possible    Has the patient been seen by the PCP for this problem? Not Applicable    Additional comments: Please call Emili Ennis at [phone] if any questions    Best Time:  anytime    Can we leave a detailed message on this number?  Not Applicable    Call taken on 1/25/2019 at 3:16 PM by Lucina Hawk

## 2019-01-28 NOTE — TELEPHONE ENCOUNTER
Forms received from Order for review and signature  Placed forms:  In your box  Forms need to be faxed to [phone]    Patient call? no  Copy sent to scanning? yes    All.BAR Epps

## 2019-02-04 ENCOUNTER — TELEPHONE (OUTPATIENT)
Dept: ONCOLOGY | Facility: CLINIC | Age: 64
End: 2019-02-04

## 2019-02-04 ENCOUNTER — OFFICE VISIT (OUTPATIENT)
Dept: FAMILY MEDICINE | Facility: CLINIC | Age: 64
End: 2019-02-04
Payer: COMMERCIAL

## 2019-02-04 VITALS
RESPIRATION RATE: 16 BRPM | OXYGEN SATURATION: 100 % | DIASTOLIC BLOOD PRESSURE: 93 MMHG | WEIGHT: 241.4 LBS | SYSTOLIC BLOOD PRESSURE: 171 MMHG | HEART RATE: 86 BPM | HEIGHT: 67 IN | TEMPERATURE: 98.1 F | BODY MASS INDEX: 37.89 KG/M2

## 2019-02-04 DIAGNOSIS — E11.9 TYPE 2 DIABETES MELLITUS WITHOUT COMPLICATION, WITHOUT LONG-TERM CURRENT USE OF INSULIN (H): ICD-10-CM

## 2019-02-04 DIAGNOSIS — E85.9 AMYLOIDOSIS, UNSPECIFIED TYPE (H): ICD-10-CM

## 2019-02-04 DIAGNOSIS — I89.0 ACQUIRED LYMPHEDEMA: ICD-10-CM

## 2019-02-04 DIAGNOSIS — D64.9 ANEMIA, UNSPECIFIED TYPE: Primary | ICD-10-CM

## 2019-02-04 DIAGNOSIS — B35.9 TINEA: ICD-10-CM

## 2019-02-04 DIAGNOSIS — G63 POLYNEUROPATHY ASSOCIATED WITH UNDERLYING DISEASE (H): ICD-10-CM

## 2019-02-04 PROCEDURE — 99214 OFFICE O/P EST MOD 30 MIN: CPT | Performed by: FAMILY MEDICINE

## 2019-02-04 ASSESSMENT — MIFFLIN-ST. JEOR: SCORE: 1853.61

## 2019-02-04 NOTE — TELEPHONE ENCOUNTER
ONCOLOGY INTAKE: Records Information      APPT INFORMATION: 02/13 w/ Dr. Castro at    Referring provider:  Vince Henry MD  Referring provider s clinic:  NA  Reason for visit/diagnosis:  Anemia, unspecified type [D64.9];Amyloidosis, unspecified type (H) [E85.9]    Were the records received with the referral (via [employer])? Complete    Has patient been seen for any external appt for this diagnosis (enter clinic/location)? No    Dx:  Anemia, unspecified type [D64.9];Amyloidosis, unspecified type (H) [E85.9]: Caller intake Ref by: Vince Henry MD: Records in Epic

## 2019-02-04 NOTE — PROGRESS NOTES
SUBJECTIVE:   Parmjit Hernández is a 63 year old male who presents to clinic today for the following health issues:      Chief Complaint   Patient presents with     RECHECK     Fatigue Follow Up     Pt states that he is here today to follow up on his fatigue and his rapidly decreased Hemoglobin.    The patient presents to clinic to address multiple concerns.  Medical history is significant for lymphedema, anemia, type 2 diabetes and amyloidosis. Currently, he reports fatigue and excessive daytime drowsiness. He is due for a visit with sleep medicine.     Also, diabetes has been well controlled. Fasting blood glucose = 90. Post prandial is in the 100's. Denies any hypoglycemic episodes.     Edema improved significantly, using compression stockings.     Concerned about peripheral neuropathy.     Problem list and histories reviewed & adjusted, as indicated.  Additional history: as documented    Patient Active Problem List   Diagnosis     Obstructive sleep apnea     HYPERLIPIDEMIA LDL GOAL <100     Hypertension goal BP (blood pressure) < 140/90     Advanced directives, counseling/discussion     Migraine     History of diverticulitis     MENTAL HEALTH     Marianne (H)     Bipolar I disorder (H)     Chronic abdominal pain     Anxiety     Other amyloidosis (H)     Insomnia due to medical condition     Narcotic dependence (H)     Obstructive sleep apnea syndrome     Type 2 diabetes mellitus without complication, without long-term current use of insulin (H)     Restless legs syndrome (RLS)     Type 2 diabetes mellitus with other specified complication (H)     Peripheral edema     Morbid obesity (H)     Stasis dermatitis of both legs     Past Surgical History:   Procedure Laterality Date     BONE MARROW BIOPSY, BONE SPECIMEN, NEEDLE/TROCAR N/A 6/8/2016    Procedure: BIOPSY BONE MARROW;  Surgeon: Nathan Agrawal MD;  Location:  GI     C NONSPECIFIC PROCEDURE  94    Cholecystectomy     C NONSPECIFIC PROCEDURE  2000     repair deviated septum     CHOLECYSTECTOMY  1995    lap qian     COLONOSCOPY  2012    hx polyps     ESOPHAGOSCOPY, GASTROSCOPY, DUODENOSCOPY (EGD), COMBINED N/A 5/29/2015    Procedure: COMBINED ESOPHAGOSCOPY, GASTROSCOPY, DUODENOSCOPY (EGD), BIOPSY SINGLE OR MULTIPLE;  Surgeon: John Jacob MD;  Location: [facility]     HERNIA REPAIR, UMBILICAL  2006       Social History     Tobacco Use     Smoking status: Never Smoker     Smokeless tobacco: Never Used   Substance Use Topics     Alcohol use: No     Alcohol/week: 0.0 oz     Family History   Problem Relation Age of Onset     Cerebrovascular Disease Mother      C.A.D. Mother      Hypertension Mother      Alcohol/Drug Mother      Arthritis Mother      Cerebrovascular Disease Maternal Grandmother      C.A.D. Maternal Grandmother      Alcohol/Drug Maternal Grandmother      C.A.D. Father      Heart Disease Father      Hypertension Father      Alcohol/Drug Father      Allergies Father      Circulatory Father      Depression Father      Respiratory Father      Asthma Father      Alcohol/Drug Paternal Grandfather      Cerebrovascular Disease Paternal Grandfather      Depression Son      Psychotic Disorder Son         anxiety disorder     Depression Daughter      Cerebrovascular Disease Maternal Grandfather      Cerebrovascular Disease Paternal Grandmother      Diabetes Brother      Allergies Sister      Depression Sister      Gynecology Sister      Coronary Artery Disease No family hx of      Hyperlipidemia No family hx of      Breast Cancer No family hx of      Colon Cancer No family hx of      Prostate Cancer No family hx of      Other Cancer No family hx of      Anxiety Disorder No family hx of      Mental Illness No family hx of      Substance Abuse No family hx of      Anesthesia Reaction No family hx of      Osteoporosis No family hx of      Genetic Disorder No family hx of      Thyroid Disease No family hx of      Obesity No family hx of      Unknown/Adopted No  "family hx of            Reviewed and updated as needed this visit by clinical staff  Tobacco       Reviewed and updated as needed this visit by Provider         ROS:  Constitutional, HEENT, cardiovascular, pulmonary, gi and gu systems are negative, except as otherwise noted.    OBJECTIVE:     BP (!) 171/93   Pulse 86   Temp 98.1  F (36.7  C) (Oral)   Resp 16   Ht 1.702 m (5' 7\")   Wt 109.5 kg (241 lb 6.4 oz)   SpO2 100%   BMI 37.81 kg/m    Body mass index is 37.81 kg/m .  GENERAL: healthy, alert and no distress  RESP: lungs clear to auscultation - no rales, rhonchi or wheezes  CV: regular rate and rhythm, normal S1 S2, no S3 or S4, no murmur, click or rub, no peripheral edema and peripheral pulses strong  Diabetic foot exam: normal DP and PT pulses, tinea infection in both feet. Hypertrophic nails.  Sensation diminished bilaterally.     Diagnostic Test Results:  Results for orders placed or performed in visit on 01/24/19   **Folate FUTURE 2mo   Result Value Ref Range    Folate 8.6 >5.4 ng/mL       ASSESSMENT/PLAN:   1. Anemia, unspecified type  Assessment: In order to evaluate for causes of anemia, labs were ordered.  To evaluate for red blood cell destructions LDL, haptoglobin and bilirubin were ordered.  With folate, B12 and ferritin were also ordered but all of which were within normal limits. At this point, due to the patient's history of amyloidosis and bone marrow autotransplant, cause of the anemia is likely secondary to hyperproliferation of the bone marrow. Patient was advised to schedule an appointment with heme/onc.   Plan:  - ONC/HEME ADULT REFERRAL    2. Acquired lymphedema  Improved  significantly since last visit.  Advised to continue with compression stockings.    3. Type 2 diabetes mellitus without complication, without long-term current use of insulin (H)  Will controlled with current medications.  Continue with current medications.    4. Amyloidosis, unspecified type (H)  - ONC/HEME ADULT " REFERRAL    5. Polyneuropathy associated with underlying disease (H)  Sensation diminished in bilateral lower extremities.  Polyneuropathies associated with his history of poorly controlled type 2 diabetes.    6. Tinea  Advised to start OTC medications for fungal infections. Patient is already using a spray for jock itch and will use the same one for feet too.       Return to clinic in 3 months.       Vince Henry MD  [facility]

## 2019-02-08 ENCOUNTER — MYC MEDICAL ADVICE (OUTPATIENT)
Dept: FAMILY MEDICINE | Facility: CLINIC | Age: 64
End: 2019-02-08

## 2019-02-11 NOTE — TELEPHONE ENCOUNTER
RECORDS STATUS - ALL OTHER DIAGNOSIS      RECORDS RECEIVED FROM: [facility], [facility]   DATE RECEIVED: 2/11/19   NOTES STATUS DETAILS   OFFICE NOTE from referring provider  [facility]   OFFICE NOTE from medical oncologist NA    DISCHARGE SUMMARY from hospital NA    DISCHARGE REPORT from the ER  [facility]   OPERATIVE REPORT NA    MEDICATION LIST  [facility]   CLINICAL TRIAL TREATMENTS TO DATE NA    LABS     PATHOLOGY REPORTS 1/17/19 - Blood Morphology Epic   ANYTHING RELATED TO DIAGNOSIS  Epic   GENONOMIC TESTING     TYPE:     IMAGING (NEED IMAGES & REPORT)     CT SCANS NA    MRI NA    MAMMO NA    ULTRASOUND 1/5/19 ([facility]) Report in [facility], Requested.   PET NA

## 2019-02-13 ENCOUNTER — HOSPITAL ENCOUNTER (OUTPATIENT)
Facility: CLINIC | Age: 64
Setting detail: SPECIMEN
Discharge: HOME | End: 2019-02-13
Attending: INTERNAL MEDICINE | Admitting: INTERNAL MEDICINE
Payer: COMMERCIAL

## 2019-02-13 ENCOUNTER — ONCOLOGY VISIT (OUTPATIENT)
Dept: ONCOLOGY | Facility: CLINIC | Age: 64
End: 2019-02-13
Attending: INTERNAL MEDICINE
Payer: COMMERCIAL

## 2019-02-13 ENCOUNTER — PRE VISIT (OUTPATIENT)
Dept: ONCOLOGY | Facility: CLINIC | Age: 64
End: 2019-02-13

## 2019-02-13 ENCOUNTER — TELEPHONE (OUTPATIENT)
Dept: OTHER | Facility: CLINIC | Age: 64
End: 2019-02-13

## 2019-02-13 VITALS
HEIGHT: 67 IN | WEIGHT: 246.3 LBS | OXYGEN SATURATION: 99 % | HEART RATE: 86 BPM | SYSTOLIC BLOOD PRESSURE: 154 MMHG | TEMPERATURE: 97.4 F | BODY MASS INDEX: 38.66 KG/M2 | DIASTOLIC BLOOD PRESSURE: 92 MMHG

## 2019-02-13 DIAGNOSIS — L03.115 CELLULITIS OF RIGHT LOWER EXTREMITY: ICD-10-CM

## 2019-02-13 DIAGNOSIS — E85.81 LIGHT CHAIN (AL) AMYLOIDOSIS (H): Primary | ICD-10-CM

## 2019-02-13 LAB
ALBUMIN SERPL-MCNC: 3.2 G/DL (ref 3.4–5)
ALP SERPL-CCNC: 107 U/L (ref 40–150)
ALT SERPL W P-5'-P-CCNC: 25 U/L (ref 0–70)
ANION GAP SERPL CALCULATED.3IONS-SCNC: 6 MMOL/L (ref 3–14)
AST SERPL W P-5'-P-CCNC: 18 U/L (ref 0–45)
BASOPHILS # BLD AUTO: 0 10E9/L (ref 0–0.2)
BASOPHILS NFR BLD AUTO: 0.8 %
BILIRUB SERPL-MCNC: 0.3 MG/DL (ref 0.2–1.3)
BUN SERPL-MCNC: 21 MG/DL (ref 7–30)
CALCIUM SERPL-MCNC: 8.3 MG/DL (ref 8.5–10.1)
CHLORIDE SERPL-SCNC: 115 MMOL/L (ref 94–109)
CO2 SERPL-SCNC: 22 MMOL/L (ref 20–32)
CREAT SERPL-MCNC: 0.83 MG/DL (ref 0.66–1.25)
DAT IGG-SP REAG RBC-IMP: NORMAL
DIFFERENTIAL METHOD BLD: ABNORMAL
EOSINOPHIL # BLD AUTO: 0.2 10E9/L (ref 0–0.7)
EOSINOPHIL NFR BLD AUTO: 5.3 %
ERYTHROCYTE [DISTWIDTH] IN BLOOD BY AUTOMATED COUNT: 14.7 % (ref 10–15)
GFR SERPL CREATININE-BSD FRML MDRD: >90 ML/MIN/{1.73_M2}
GLUCOSE SERPL-MCNC: 132 MG/DL (ref 70–99)
HCT VFR BLD AUTO: 30.8 % (ref 40–53)
HGB BLD-MCNC: 10.2 G/DL (ref 13.3–17.7)
IMM GRANULOCYTES # BLD: 0 10E9/L (ref 0–0.4)
IMM GRANULOCYTES NFR BLD: 1 %
LDH SERPL L TO P-CCNC: 161 U/L (ref 85–227)
LYMPHOCYTES # BLD AUTO: 0.6 10E9/L (ref 0.8–5.3)
LYMPHOCYTES NFR BLD AUTO: 16 %
MCH RBC QN AUTO: 31.7 PG (ref 26.5–33)
MCHC RBC AUTO-ENTMCNC: 33.1 G/DL (ref 31.5–36.5)
MCV RBC AUTO: 96 FL (ref 78–100)
MONOCYTES # BLD AUTO: 0.3 10E9/L (ref 0–1.3)
MONOCYTES NFR BLD AUTO: 7.8 %
NEUTROPHILS # BLD AUTO: 2.8 10E9/L (ref 1.6–8.3)
NEUTROPHILS NFR BLD AUTO: 69.1 %
NRBC # BLD AUTO: 0 10*3/UL
NRBC BLD AUTO-RTO: 0 /100
PLATELET # BLD AUTO: 112 10E9/L (ref 150–450)
POTASSIUM SERPL-SCNC: 4.8 MMOL/L (ref 3.4–5.3)
PROT SERPL-MCNC: 6.4 G/DL (ref 6.8–8.8)
RBC # BLD AUTO: 3.22 10E12/L (ref 4.4–5.9)
SODIUM SERPL-SCNC: 143 MMOL/L (ref 133–144)
WBC # BLD AUTO: 4 10E9/L (ref 4–11)

## 2019-02-13 PROCEDURE — 82784 ASSAY IGA/IGD/IGG/IGM EACH: CPT | Performed by: INTERNAL MEDICINE

## 2019-02-13 PROCEDURE — 83010 ASSAY OF HAPTOGLOBIN QUANT: CPT | Performed by: INTERNAL MEDICINE

## 2019-02-13 PROCEDURE — 86334 IMMUNOFIX E-PHORESIS SERUM: CPT | Performed by: INTERNAL MEDICINE

## 2019-02-13 PROCEDURE — 99205 OFFICE O/P NEW HI 60 MIN: CPT | Performed by: INTERNAL MEDICINE

## 2019-02-13 PROCEDURE — 83615 LACTATE (LD) (LDH) ENZYME: CPT | Performed by: INTERNAL MEDICINE

## 2019-02-13 PROCEDURE — 83883 ASSAY NEPHELOMETRY NOT SPEC: CPT | Performed by: INTERNAL MEDICINE

## 2019-02-13 PROCEDURE — 84165 PROTEIN E-PHORESIS SERUM: CPT | Performed by: INTERNAL MEDICINE

## 2019-02-13 PROCEDURE — 86880 COOMBS TEST DIRECT: CPT | Performed by: INTERNAL MEDICINE

## 2019-02-13 PROCEDURE — 00000402 ZZHCL STATISTIC TOTAL PROTEIN: Performed by: INTERNAL MEDICINE

## 2019-02-13 PROCEDURE — 85025 COMPLETE CBC W/AUTO DIFF WBC: CPT | Performed by: INTERNAL MEDICINE

## 2019-02-13 PROCEDURE — 80053 COMPREHEN METABOLIC PANEL: CPT | Performed by: INTERNAL MEDICINE

## 2019-02-13 PROCEDURE — G0463 HOSPITAL OUTPT CLINIC VISIT: HCPCS

## 2019-02-13 RX ORDER — LEVOFLOXACIN 500 MG/1
500 TABLET, FILM COATED ORAL DAILY
Qty: 10 TABLET | Refills: 0 | Status: SHIPPED | OUTPATIENT
Start: 2019-02-13 | End: 2019-02-28

## 2019-02-13 ASSESSMENT — PAIN SCALES - GENERAL: PAINLEVEL: NO PAIN (0)

## 2019-02-13 ASSESSMENT — MIFFLIN-ST. JEOR: SCORE: 1875.84

## 2019-02-13 NOTE — PATIENT INSTRUCTIONS
1- Labs today / complete [facility]  2- Schedule a bone marrow biopsy- ASAP, scheduled. Tia  3- RTC MD 3-4 days after bone marrow biopsy. , scheduled. Tia  4- Please check if he can be seen by surgery today for right leg ulcer          Appointment With Dr. Ferrer on 2/14/19 at 3pm        [id number]        [place] [address]        [phone]

## 2019-02-13 NOTE — LETTER
2/13/2019         RE: Parmjit Hernández  [address]  [place] [facility] [place] [facility] [address]        Dear Colleague,    Thank you for referring your patient, Parmjit Hernández, to the [facility]. Please see a copy of my visit note below.    This clinic visit note has been dictated [phone]      [facility]  HEMATOLOGY ONCOLOGY    Visit Date:   02/13/2019      REASON FOR CONSULTATION:  New onset macrocytic anemia in a gentleman with a diagnosis of AL amyloidosis, status post bone marrow transplant, excessive fatigue and right leg ulcer.      REFERRING PROVIDER:  Vince Henry MD      HISTORY OF PRESENT ILLNESS:  This is a 63-year-old gentleman.  He presented in 05/2015 with right upper quadrant pain which continued to get worse and was noted to have peripancreatic lymph node enlargement.  In 04/2016, he underwent EUS with biopsy which showed extracellular material.  It was positive for amyloid stain.  It was consistent with AL kappa amyloidosis subtype in the lymph nodes.  In 06/2016, he had a 1.4 cm lytic lesion in the right radial diaphysis and in the calvarium.  He had reduced IgG level, IgM level and IgA levels and no paraproteinemia.  Initial bone marrow biopsy 06/08/2016 showed normal cellular marrow with 50% cellularity, 5% plasma cells, 4% kappa monotypic plasma cells.  Bone marrow stained positive for Congo red.  Normal cytogenetics.  FISH was consistent with translocation 11/14, monosomy 13, consistent with plasma cell neoplasm.  PET/CT scan in 06/2016 was negative for lytic lesion.  Subcarinal lymph nodes were biopsied and were positive for Congo red.  Mass spectrometry was consistent with AL kappa type amyloid.  He was treated with CyBorD starting from 08/2016.  Very good partial response was achieved with normalization of the light chains and mildly persistent elevation of light chain ratio.  In 11/2016, he proceeded with stem cell transplant after Neupogen stem cell  mobilization.  He had conditioning with melphalan followed by infusion of autologous stem cell transplant.  He achieved complete hematological response and was last seen by his primary hematologist, Dr. Ming Trinh on 07/03/2018.  He also has significant history of continued abdominal pain.  For that, he was regularly seen by [facility].  In 12/2018, he had a pain pump placed.      He presented today for worsening fatigue since December.  Workup performed by primary care has indicated that there is a decline in his hemoglobin.  His hemoglobin was normal in 08/2018.  It had declined to 9.3.  His MCV is up to 103.  He had normal platelet count and normal white cell count.  Absolute reticulocyte count is 96.  Additional workup by primary care physician was consistent with normal folate and normal B12 level.  He states that his fatigue is progressively getting worse.  He also is diabetic, has bilateral lower extremity lymphedema.  He has developed a 5 cm ulcer on his right lower leg since last several weeks.      PAST MEDICAL HISTORY:  Systemic amyloidosis, it is AL kappa amyloidosis, status post bone marrow transplant, diabetes, bipolar disorder.      MEDICATIONS:  Reviewed.      ALLERGIES:  REVIEWED.      SOCIAL HISTORY:  He drives [employer], does not smoke, states that he drinks alcohol very occasionally.      FAMILY HISTORY:  Mother had coronary artery disease and hypertension.  Father had some sort of hematological malignancy, unknown type.      REVIEW OF SYSTEMS:  A complete review of systems was performed and found to be negative other than pertinent positives mentioned in history of present illness.      PHYSICAL EXAMINATION:  Blood pressure was elevated at 154/92, pulse 86, temperature 97.4.   CONSTITUTIONAL: Sitting comfortably.   HEENT: Pupils are equal. Oropharynx is clear.   NECK: No cervical or supraclavicular lymphadenopathy.   RESPIRATORY: Clear bilaterally.   CARD/VASC: S1, S2, regular.   GI: Soft,  nontender, nondistended, no hepatosplenomegaly.   MUSKULOSKELETAL: Warm, well perfused. He had approximately 5 cm ulcer on his right lower leg which was crusted.  NEUROLOGIC: Alert, awake.   INTEGUMENT: No rash.   LYMPHATICS:He has bilateral lower extremity edema.  PSYCH: He was quite anxious.        LABORATORY DATA AND IMAGING REVIEWED DURING THIS VISIT:  Recent Labs   Lab Test 02/13/19  1410 01/17/19  1202    140   POTASSIUM 4.8 4.1   CHLORIDE 115* 107   CO2 22 22   ANIONGAP 6 11   BUN 21 26   CR 0.83 0.90   * 142*   LUIS 8.3* 8.9     Recent Labs   Lab Test 02/13/19  1410 01/17/19  1202 08/28/18 2015   WBC 4.0 6.1 9.2   HGB 10.2* 9.3* 13.5   * 170 194   MCV 96 103* 95   NEUTROPHIL 69.1 71.9 76.8     Recent Labs   Lab Test 02/13/19  1410 01/17/19  1202 09/16/18 08/28/18 2015 08/28/18  1259   BILITOTAL 0.3 0.3  --  1.7* 1.6*   ALKPHOS 107 86  --  78 75   ALT 25 15 32 30 28   AST 18 9 26 16 15   ALBUMIN 3.2* 3.3*  --  3.8 4.0     --   --   --  465*         Results for orders placed or performed in visit on 12/18/18   XR Chest 2 Views    Narrative    CHEST TWO VIEWS 12/18/2018 2:44 PM     HISTORY: Stasis dermatitis of both legs.    COMPARISON: January 29, 2018     FINDINGS: There are no acute infiltrates. The cardiac silhouette is  not enlarged. Pulmonary vasculature is unremarkable.      Impression    IMPRESSION: No acute disease.    LOPEZ SANDS MD     Outside medical records/labs, imaging results were reviewed and discussed in HPI     ECOG performance status 1.      ASSESSMENT AND RECOMMENDATIONS:  This is a 63-year-old gentleman with AL kappa amyloidosis subtype in the lymph nodes and bone marrow.  He is status post autologous peripheral blood stem cell transplant and achieved complete hematological remission after that.  He was last seen by his primary hematologist, Dr. Ming Trinh, on 07/03/2018.      Sudden development of macrocytic anemia is concerning.  Initial workup has been  negative.  I will proceed with additional labs today, which will include serum protein electrophoresis, immunofixation light chain assay, additional testing for hemolysis including haptoglobin, lactate dehydrogenase and Manrpeet test, a repeat CBC and comprehensive metabolic profile.  I discussed that given his history of transplant and sudden decline in his hemoglobin, I would like him to get a bone marrow biopsy for restaging purposes.      He has ulcer on his right leg which makes me concerned given his history of diabetes.  I will have him start oral antibiotics and will have him see General or Vascular Surgery tomorrow.  Our office is working on getting him appointments for that.  I plan to see him after his bone marrow biopsy.         MARY CASTRO MD             D: 2019   T: 2019   MT: RAY      Name:     JULIO PRESCOTT   MRN:      [id number]        Account:      [id number]   :      1955           Visit Date:   2019      Document: [id number]       cc: Vince Trinh MD       Again, thank you for allowing me to participate in the care of your patient.        Sincerely,        Mary Castro MD

## 2019-02-14 ENCOUNTER — OFFICE VISIT (OUTPATIENT)
Dept: FAMILY MEDICINE | Facility: CLINIC | Age: 64
End: 2019-02-14
Payer: COMMERCIAL

## 2019-02-14 VITALS
SYSTOLIC BLOOD PRESSURE: 147 MMHG | HEART RATE: 92 BPM | OXYGEN SATURATION: 96 % | BODY MASS INDEX: 38.53 KG/M2 | DIASTOLIC BLOOD PRESSURE: 92 MMHG | WEIGHT: 246 LBS

## 2019-02-14 DIAGNOSIS — L98.491 SKIN ULCER, LIMITED TO BREAKDOWN OF SKIN (H): Primary | ICD-10-CM

## 2019-02-14 LAB
ALBUMIN SERPL ELPH-MCNC: 3.8 G/DL (ref 3.7–5.1)
ALPHA1 GLOB SERPL ELPH-MCNC: 0.3 G/DL (ref 0.2–0.4)
ALPHA2 GLOB SERPL ELPH-MCNC: 0.6 G/DL (ref 0.5–0.9)
B-GLOBULIN SERPL ELPH-MCNC: 0.7 G/DL (ref 0.6–1)
GAMMA GLOB SERPL ELPH-MCNC: 0.6 G/DL (ref 0.7–1.6)
HAPTOGLOB SERPL-MCNC: 61 MG/DL (ref 35–175)
IGA SERPL-MCNC: 41 MG/DL (ref 70–380)
IGG SERPL-MCNC: 577 MG/DL (ref 695–1620)
IGM SERPL-MCNC: 40 MG/DL (ref 60–265)
KAPPA LC UR-MCNC: 1.59 MG/DL (ref 0.33–1.94)
KAPPA LC/LAMBDA SER: 1.06 {RATIO} (ref 0.26–1.65)
LAMBDA LC SERPL-MCNC: 1.5 MG/DL (ref 0.57–2.63)
M PROTEIN SERPL ELPH-MCNC: 0 G/DL
PROT PATTERN SERPL ELPH-IMP: ABNORMAL
PROT PATTERN SERPL IFE-IMP: ABNORMAL

## 2019-02-14 PROCEDURE — 99213 OFFICE O/P EST LOW 20 MIN: CPT | Performed by: FAMILY MEDICINE

## 2019-02-14 NOTE — PROGRESS NOTES
[facility]  HEMATOLOGY ONCOLOGY    Visit Date:   02/13/2019      REASON FOR CONSULTATION:  New onset macrocytic anemia in a gentleman with a diagnosis of AL amyloidosis, status post bone marrow transplant, excessive fatigue and right leg ulcer.      REFERRING PROVIDER:  Vince Henry MD      HISTORY OF PRESENT ILLNESS:  This is a 63-year-old gentleman.  He presented in 05/2015 with right upper quadrant pain which continued to get worse and was noted to have peripancreatic lymph node enlargement.  In 04/2016, he underwent EUS with biopsy which showed extracellular material.  It was positive for amyloid stain.  It was consistent with AL kappa amyloidosis subtype in the lymph nodes.  In 06/2016, he had a 1.4 cm lytic lesion in the right radial diaphysis and in the calvarium.  He had reduced IgG level, IgM level and IgA levels and no paraproteinemia.  Initial bone marrow biopsy 06/08/2016 showed normal cellular marrow with 50% cellularity, 5% plasma cells, 4% kappa monotypic plasma cells.  Bone marrow stained positive for Congo red.  Normal cytogenetics.  FISH was consistent with translocation 11/14, monosomy 13, consistent with plasma cell neoplasm.  PET/CT scan in 06/2016 was negative for lytic lesion.  Subcarinal lymph nodes were biopsied and were positive for Congo red.  Mass spectrometry was consistent with AL kappa type amyloid.  He was treated with CyBorD starting from 08/2016.  Very good partial response was achieved with normalization of the light chains and mildly persistent elevation of light chain ratio.  In 11/2016, he proceeded with stem cell transplant after Neupogen stem cell mobilization.  He had conditioning with melphalan followed by infusion of autologous stem cell transplant.  He achieved complete hematological response and was last seen by his primary hematologist, Dr. Ming Trinh on 07/03/2018.  He also has significant history of continued abdominal pain.  For that, he  was regularly seen by [facility].  In 12/2018, he had a pain pump placed.      He presented today for worsening fatigue since December.  Workup performed by primary care has indicated that there is a decline in his hemoglobin.  His hemoglobin was normal in 08/2018.  It had declined to 9.3.  His MCV is up to 103.  He had normal platelet count and normal white cell count.  Absolute reticulocyte count is 96.  Additional workup by primary care physician was consistent with normal folate and normal B12 level.  He states that his fatigue is progressively getting worse.  He also is diabetic, has bilateral lower extremity lymphedema.  He has developed a 5 cm ulcer on his right lower leg since last several weeks.      PAST MEDICAL HISTORY:  Systemic amyloidosis, it is AL kappa amyloidosis, status post bone marrow transplant, diabetes, bipolar disorder.      MEDICATIONS:  Reviewed.      ALLERGIES:  REVIEWED.      SOCIAL HISTORY:  He drives [employer], does not smoke, states that he drinks alcohol very occasionally.      FAMILY HISTORY:  Mother had coronary artery disease and hypertension.  Father had some sort of hematological malignancy, unknown type.      REVIEW OF SYSTEMS:  A complete review of systems was performed and found to be negative other than pertinent positives mentioned in history of present illness.      PHYSICAL EXAMINATION:  Blood pressure was elevated at 154/92, pulse 86, temperature 97.4.   CONSTITUTIONAL: Sitting comfortably.   HEENT: Pupils are equal. Oropharynx is clear.   NECK: No cervical or supraclavicular lymphadenopathy.   RESPIRATORY: Clear bilaterally.   CARD/VASC: S1, S2, regular.   GI: Soft, nontender, nondistended, no hepatosplenomegaly.   MUSKULOSKELETAL: Warm, well perfused. He had approximately 5 cm ulcer on his right lower leg which was crusted.  NEUROLOGIC: Alert, awake.   INTEGUMENT: No rash.   LYMPHATICS:He has bilateral lower extremity edema.  PSYCH: He was quite anxious.        LABORATORY  DATA AND IMAGING REVIEWED DURING THIS VISIT:  Recent Labs   Lab Test 02/13/19  1410 01/17/19  1202    140   POTASSIUM 4.8 4.1   CHLORIDE 115* 107   CO2 22 22   ANIONGAP 6 11   BUN 21 26   CR 0.83 0.90   * 142*   LUIS 8.3* 8.9     Recent Labs   Lab Test 02/13/19  1410 01/17/19  1202 08/28/18 2015   WBC 4.0 6.1 9.2   HGB 10.2* 9.3* 13.5   * 170 194   MCV 96 103* 95   NEUTROPHIL 69.1 71.9 76.8     Recent Labs   Lab Test 02/13/19  1410 01/17/19  1202 09/16/18 08/28/18 2015 08/28/18  1259   BILITOTAL 0.3 0.3  --  1.7* 1.6*   ALKPHOS 107 86  --  78 75   ALT 25 15 32 30 28   AST 18 9 26 16 15   ALBUMIN 3.2* 3.3*  --  3.8 4.0     --   --   --  465*         Results for orders placed or performed in visit on 12/18/18   XR Chest 2 Views    Narrative    CHEST TWO VIEWS 12/18/2018 2:44 PM     HISTORY: Stasis dermatitis of both legs.    COMPARISON: January 29, 2018     FINDINGS: There are no acute infiltrates. The cardiac silhouette is  not enlarged. Pulmonary vasculature is unremarkable.      Impression    IMPRESSION: No acute disease.    LOPEZ SANDS MD     Outside medical records/labs, imaging results were reviewed and discussed in HPI     ECOG performance status 1.      ASSESSMENT AND RECOMMENDATIONS:  This is a 63-year-old gentleman with AL kappa amyloidosis subtype in the lymph nodes and bone marrow.  He is status post autologous peripheral blood stem cell transplant and achieved complete hematological remission after that.  He was last seen by his primary hematologist, Dr. Ming Trinh, on 07/03/2018.      Sudden development of macrocytic anemia is concerning.  Initial workup has been negative.  I will proceed with additional labs today, which will include serum protein electrophoresis, immunofixation light chain assay, additional testing for hemolysis including haptoglobin, lactate dehydrogenase and Manpreet test, a repeat CBC and comprehensive metabolic profile.  I discussed that given his  history of transplant and sudden decline in his hemoglobin, I would like him to get a bone marrow biopsy for restaging purposes.      He has ulcer on his right leg which makes me concerned given his history of diabetes.  I will have him start oral antibiotics and will have him see General or Vascular Surgery tomorrow.  Our office is working on getting him appointments for that.  I plan to see him after his bone marrow biopsy.         RAVI KAM MD             D: 2019   T: 2019   MT: NTS      Name:     JULIO PRESCOTT   MRN:      [id number]        Account:      [id number]   :      1955           Visit Date:   2019      Document: [id number]       cc: Vince Trinh MD

## 2019-02-14 NOTE — PROGRESS NOTES
SUBJECTIVE:   Parmjit Hernández is a 63 year old male who presents to clinic today for the following health issues:    LESION    Duration: 2 weeks    Description (location/character/radiation): quarter sized lesion on Right calf    Intensity:  Moderate-severe    Accompanying signs and symptoms: none    History (similar episodes/previous evaluation): None    Precipitating or alleviating factors: None    Therapies tried and outcome: None     Developed an ulcer on the right LE.   63-year-old who presents to clinic for evaluation of new right lower extremity ulcer.  Patient reports that also has been present for the past 1.5-2 weeks.  Painful to touch. He does not recall the shape of the ulcer when it started and states that it has remained the same size and shape for the past 1 week. Denies noticing any drainage. Denies any numbness or tingling. Family history of scleroderma and morphae. Concerned about the development of scleroderma.     Also, the patient has chronic anemia and fatigue.  Given his history of AL kappa amyloidosis with autologous peripheral blood stream cell transplant.  Patient was seen and evaluated by oncology.  It was recommended that he undergoes a bone marrow biopsy  for restaging.    Problem list and histories reviewed & adjusted, as indicated.  Additional history: as documented    Patient Active Problem List   Diagnosis     Obstructive sleep apnea     HYPERLIPIDEMIA LDL GOAL <100     Hypertension goal BP (blood pressure) < 140/90     Advanced directives, counseling/discussion     Migraine     History of diverticulitis     MENTAL HEALTH     Marianne (H)     Bipolar I disorder (H)     Chronic abdominal pain     Anxiety     Other amyloidosis (H)     Insomnia due to medical condition     Narcotic dependence (H)     Obstructive sleep apnea syndrome     Type 2 diabetes mellitus without complication, without long-term current use of insulin (H)     Restless legs syndrome (RLS)     Type 2 diabetes mellitus  with other specified complication (H)     Peripheral edema     Morbid obesity (H)     Stasis dermatitis of both legs     Polyneuropathy associated with underlying disease (H)     Acquired lymphedema     Past Surgical History:   Procedure Laterality Date     BONE MARROW BIOPSY, BONE SPECIMEN, NEEDLE/TROCAR N/A 6/8/2016    Procedure: BIOPSY BONE MARROW;  Surgeon: Nathan Agrawal MD;  Location:  GI     C NONSPECIFIC PROCEDURE  94    Cholecystectomy     C NONSPECIFIC PROCEDURE  2000    repair deviated septum     CHOLECYSTECTOMY  1995    lap qian     COLONOSCOPY  2012    hx polyps     ESOPHAGOSCOPY, GASTROSCOPY, DUODENOSCOPY (EGD), COMBINED N/A 5/29/2015    Procedure: COMBINED ESOPHAGOSCOPY, GASTROSCOPY, DUODENOSCOPY (EGD), BIOPSY SINGLE OR MULTIPLE;  Surgeon: John Jacob MD;  Location:  GI     HERNIA REPAIR, UMBILICAL  2006       Social History     Tobacco Use     Smoking status: Never Smoker     Smokeless tobacco: Never Used   Substance Use Topics     Alcohol use: No     Alcohol/week: 0.0 oz     Family History   Problem Relation Age of Onset     Cerebrovascular Disease Mother      C.A.D. Mother      Hypertension Mother      Alcohol/Drug Mother      Arthritis Mother      Cerebrovascular Disease Maternal Grandmother      C.A.D. Maternal Grandmother      Alcohol/Drug Maternal Grandmother      C.A.D. Father      Heart Disease Father      Hypertension Father      Alcohol/Drug Father      Allergies Father      Circulatory Father      Depression Father      Respiratory Father      Asthma Father      Alcohol/Drug Paternal Grandfather      Cerebrovascular Disease Paternal Grandfather      Depression Son      Psychotic Disorder Son         anxiety disorder     Depression Daughter      Cerebrovascular Disease Maternal Grandfather      Cerebrovascular Disease Paternal Grandmother      Diabetes Brother      Allergies Sister      Depression Sister      Gynecology Sister      Coronary Artery Disease No  family hx of      Hyperlipidemia No family hx of      Breast Cancer No family hx of      Colon Cancer No family hx of      Prostate Cancer No family hx of      Other Cancer No family hx of      Anxiety Disorder No family hx of      Mental Illness No family hx of      Substance Abuse No family hx of      Anesthesia Reaction No family hx of      Osteoporosis No family hx of      Genetic Disorder No family hx of      Thyroid Disease No family hx of      Obesity No family hx of      Unknown/Adopted No family hx of            Reviewed and updated as needed this visit by clinical staff  Allergies  Meds       Reviewed and updated as needed this visit by Provider         ROS:  Constitutional, HEENT, cardiovascular, pulmonary, gi and gu systems are negative, except as otherwise noted.    OBJECTIVE:     BP (!) 147/92   Pulse 92   Wt 111.6 kg (246 lb)   SpO2 96%   BMI 38.53 kg/m    Body mass index is 38.53 kg/m .  GENERAL: healthy, alert and no distress  RESP: lungs clear to auscultation - no rales, rhonchi or wheezes  CV: regular rate and rhythm, normal S1 S2, no S3 or S4, no murmur, click or rub, no peripheral edema and peripheral pulses strong  SKIN: right lateral calf superficial ulcer with surrounding erythema.  White crusty patch tenderness to palpation covering the surface of the ulcer.  Diameter of the ulcer is 2.5 x 2.7 cm.  Surrounding erythema are 1-1.5 cm.    Diagnostic Test Results:  none     ASSESSMENT/PLAN:   1. Skin ulcer, limited to breakdown of skin (H)  Assessment: Skin also with superficial white crusty patch on the center.  Differential diagnosis includes venous ulcer versus mixed venous arterial ulcer.  The patient has an appointment with vascular medicine later today.  I would recommend evaluation by vascular medicine and continuing with current antibiotics levofloxacin.    Plan:  -Advised to proceed with his vascular appointment after a few hours.  Continue with current antibiotics  -Return to  clinic for wound debridement and removal of the right patch if he did not get removed by vascular later today.      Vince Henry MD  [facility]

## 2019-02-15 ENCOUNTER — OFFICE VISIT (OUTPATIENT)
Dept: OTHER | Facility: CLINIC | Age: 64
End: 2019-02-15
Attending: INTERNAL MEDICINE
Payer: COMMERCIAL

## 2019-02-15 ENCOUNTER — HOSPITAL ENCOUNTER (OUTPATIENT)
Dept: LAB | Facility: CLINIC | Age: 64
Discharge: HOME | End: 2019-02-15
Attending: INTERNAL MEDICINE | Admitting: INTERNAL MEDICINE
Payer: COMMERCIAL

## 2019-02-15 VITALS
WEIGHT: 243.2 LBS | BODY MASS INDEX: 38.09 KG/M2 | SYSTOLIC BLOOD PRESSURE: 166 MMHG | OXYGEN SATURATION: 96 % | HEART RATE: 94 BPM | DIASTOLIC BLOOD PRESSURE: 107 MMHG

## 2019-02-15 DIAGNOSIS — E85.9 AMYLOIDOSIS, UNSPECIFIED TYPE (H): ICD-10-CM

## 2019-02-15 DIAGNOSIS — L97.911 ULCER OF RIGHT LOWER EXTREMITY, LIMITED TO BREAKDOWN OF SKIN (H): ICD-10-CM

## 2019-02-15 DIAGNOSIS — G47.33 OSA (OBSTRUCTIVE SLEEP APNEA): ICD-10-CM

## 2019-02-15 DIAGNOSIS — L97.911 ULCER OF RIGHT LOWER EXTREMITY, LIMITED TO BREAKDOWN OF SKIN (H): Primary | ICD-10-CM

## 2019-02-15 DIAGNOSIS — E78.2 MIXED HYPERLIPIDEMIA: ICD-10-CM

## 2019-02-15 DIAGNOSIS — E11.9 TYPE 2 DIABETES MELLITUS WITHOUT COMPLICATION, WITHOUT LONG-TERM CURRENT USE OF INSULIN (H): ICD-10-CM

## 2019-02-15 DIAGNOSIS — I83.893 VARICOSE VEINS OF BILATERAL LOWER EXTREMITIES WITH OTHER COMPLICATIONS: ICD-10-CM

## 2019-02-15 DIAGNOSIS — I10 BENIGN ESSENTIAL HYPERTENSION: ICD-10-CM

## 2019-02-15 PROCEDURE — 86200 CCP ANTIBODY: CPT | Performed by: INTERNAL MEDICINE

## 2019-02-15 PROCEDURE — 99205 OFFICE O/P NEW HI 60 MIN: CPT | Mod: ZP | Performed by: INTERNAL MEDICINE

## 2019-02-15 PROCEDURE — 86235 NUCLEAR ANTIGEN ANTIBODY: CPT | Performed by: INTERNAL MEDICINE

## 2019-02-15 PROCEDURE — 36415 COLL VENOUS BLD VENIPUNCTURE: CPT | Performed by: INTERNAL MEDICINE

## 2019-02-15 PROCEDURE — G0463 HOSPITAL OUTPT CLINIC VISIT: HCPCS

## 2019-02-15 PROCEDURE — 86038 ANTINUCLEAR ANTIBODIES: CPT | Performed by: INTERNAL MEDICINE

## 2019-02-15 PROCEDURE — 86431 RHEUMATOID FACTOR QUANT: CPT | Performed by: INTERNAL MEDICINE

## 2019-02-15 RX ORDER — HYDROCHLOROTHIAZIDE 25 MG/1
25 TABLET ORAL DAILY
Qty: 30 TABLET | Refills: 3 | Status: SHIPPED | OUTPATIENT
Start: 2019-02-15 | End: 2019-05-10

## 2019-02-15 NOTE — NURSING NOTE
"Parmjit Hernández is a 63 year old male who presents for:  Chief Complaint   Patient presents with     RECHECK     New consult for possible right leg venous ulcer, ref by Dr. Castro (Hem/Onc), see phone encounter        Vitals:    Vitals:    02/15/19 1134 02/15/19 1135   BP: (!) 168/100 (!) 166/107   BP Location: Right arm Left arm   Patient Position: Chair Chair   Cuff Size: Adult Large Adult Regular   Pulse: 94 94   SpO2: 96%    Weight: 243 lb 3.2 oz (110.3 kg)        BMI:  Estimated body mass index is 38.09 kg/m  as calculated from the following:    Height as of 2/13/19: 5' 7\" (1.702 m).    Weight as of this encounter: 243 lb 3.2 oz (110.3 kg).    Pain Score:  Data Unavailable        April Correa MA    "

## 2019-02-15 NOTE — PROGRESS NOTES
[facility] INITIAL VASCULAR MEDICINE CONSULT      PRIMARY HEALTH CARE PROVIDER:  Vince Henry MD      REFERRING HEALTH CARE PROVIDER;  Matthew Hernandez MD      REASON FOR CONSULT:  Evaluation and management of vascular causes of Rt leg ulcer developed 2 weeks ago lateral aspect of mid portion of leg. No pain, no fever, no injury, no claudication. Known DM well controlled ( A1c 5.3 in dec 2018). Hx of Amylodosis s/p stem cell transplant in dec 2016 and evolving pancytopenia, elevated MCV and planning for BMBx next week. Non smoker, no Hx of DVT 1/2019 BLE venous duplex negative for DVT. Father HX of scleroderma . He denies any arthralgias, skin changes, swallowing problems. Previous EGD ,  Colonoscopy ( B9 polyps removed) capsule endoscopy negative.   Polyneuropathy and chronic back pain with fenatanyl pump in place and got epidural injection yesterday.      HPI: Parmjit Hernández is a 63 year old very pleasant patient with past medical history of well-controlled type 2 diabetes mellitus, amyloidosis underwent stem cell transplant in December 2016 at [facility] currently following up with hematology oncologist Dr. Castro here today for evaluation and management of right leg ulcer which patient developed approximately 2 weeks ago.  No injury and no claudication symptoms.  He has a history of bilateral lower extremity swelling for which he is getting lymphedema therapy and completed 8 sessions.  His recent blood work elevated MCV and also he is evolving pancytopenia scheduled to undergo bone marrow biopsy next week.  He is non-smoker.  No previous history of a DVT underwent bilateral lower extremity venous duplex in January 2019 which was negative for DVT.  He has a bilateral lower extremity varicose veins with most prominent in the distal legs and also gaiter area.  He has a polyneuropathy and chronic back pain fentanyl pump in place and getting periodic epidural injections father has  history of scleroderma.  He denies any arthralgias but he feels stiffness of the fingers.  No skin changes, swallowing problems.  He underwent extensive GI evaluation in the past for anemia and apparently EGD, capsule endoscopy was negative and colonoscopy revealed benign polyps.  He has a history of hypertension well-controlled and also history of mixed hyperlipidemia currently taking statin.  Known history of obstructive sleep apnea and uses CPAP machine.    Reviewed records in the [facility] care everywhere today and updated chart.      PAST MEDICAL HISTORY  Past Medical History:   Diagnosis Date     Allergic state      Amyloidosis (H)      Diabetes mellitus (H)     type 2     Headache(784.0)      Hypertension 2007     Myalgia and myositis, unspecified      Obsessive-compulsive disorders      Other acquired absence of organ 94     Other specified viral warts      Pain in the abdomen      Pure hypercholesterolemia        CURRENT MEDICATIONS    Current Outpatient Medications on File Prior to Visit:  aspirin (ASPIRIN LOW DOSE) 81 MG tablet Take 1 tablet (81 mg) by mouth daily   atorvastatin (LIPITOR) 10 MG tablet Take 1 tablet (10 mg) by mouth daily   blood glucose (ACCU-CHEK COMPACT DRUM) test strip Use to test blood sugars 3 to 4 times daily.   blood glucose (ACCU-CHEK MULTICLIX) lancing device Lancing device to be used with lancets.   blood glucose monitoring (ACCU-CHEK COMPACT CARE KIT) meter device kit Use to test blood sugars 3 to 4  times daily.   divalproex (DEPAKOTE) 500 MG EC tablet Take 2 tablets (1,000 mg) by mouth daily   glipiZIDE (GLUCOTROL XL) 5 MG 24 hr tablet Take 1 tablet (5 mg) by mouth daily   ibuprofen (ADVIL/MOTRIN) 200 MG tablet Take 200 mg by mouth every 6 hours as needed for mild pain   levofloxacin (LEVAQUIN) 500 MG tablet Take 1 tablet (500 mg) by mouth daily for 10 days   lisinopril (PRINIVIL/ZESTRIL) 20 MG tablet Take 1 tablet (20 mg) by mouth daily   melatonin 5 MG tablet Take 5 mg by mouth  nightly as needed for sleep   metFORMIN (GLUCOPHAGE-XR) 500 MG 24 hr tablet Take 2 tablets (1,000 mg) by mouth 2 times daily (with meals)   OLANZapine (ZYPREXA) 5 MG tablet Take 5 mg by mouth At Bedtime   pregabalin (LYRICA) 50 MG capsule Take 100 mg by mouth 3 times daily Rx by [facility] clinic    SUMAtriptan (IMITREX) 5 MG/ACT nasal spray USE ONE TO TWO SPRAYS IN NOSTRIL AS NEEDED FOR MIGRANE. MAY REPEAT IN 2 HOURS, MAX 8 SPRAYS IN 24 HOURS.   traZODone (DESYREL) 100 MG tablet Take  mg by mouth At Bedtime     No current facility-administered medications on file prior to visit.     PAST SURGICAL HISTORY:  Past Surgical History:   Procedure Laterality Date     BONE MARROW BIOPSY, BONE SPECIMEN, NEEDLE/TROCAR N/A 6/8/2016    Procedure: BIOPSY BONE MARROW;  Surgeon: Nathan Agrawal MD;  Location:  GI     C NONSPECIFIC PROCEDURE  94    Cholecystectomy     C NONSPECIFIC PROCEDURE  2000    repair deviated septum     CHOLECYSTECTOMY  1995    lap qian     COLONOSCOPY  2012    hx polyps     ESOPHAGOSCOPY, GASTROSCOPY, DUODENOSCOPY (EGD), COMBINED N/A 5/29/2015    Procedure: COMBINED ESOPHAGOSCOPY, GASTROSCOPY, DUODENOSCOPY (EGD), BIOPSY SINGLE OR MULTIPLE;  Surgeon: John Jacob MD;  Location:  GI     HERNIA REPAIR, UMBILICAL  2006       ALLERGIES     Allergies   Allergen Reactions     Cephalexin Diarrhea     Liraglutide Other (See Comments)     Sulfa Drugs Swelling     Pt has taken  Taken sulfa drugs orally without trouble. He had problems with Sulfa eye drops. Eye swelled up     Victoza      Increased migraine frequency and severity       FAMILY HISTORY  Family History   Problem Relation Age of Onset     Cerebrovascular Disease Mother      C.A.D. Mother      Hypertension Mother      Alcohol/Drug Mother      Arthritis Mother      Cerebrovascular Disease Maternal Grandmother      C.A.D. Maternal Grandmother      Alcohol/Drug Maternal Grandmother      C.A.D. Father      Heart Disease Father       Hypertension Father      Alcohol/Drug Father      Allergies Father      Circulatory Father      Depression Father      Respiratory Father      Asthma Father      Alcohol/Drug Paternal Grandfather      Cerebrovascular Disease Paternal Grandfather      Depression Son      Psychotic Disorder Son         anxiety disorder     Depression Daughter      Cerebrovascular Disease Maternal Grandfather      Cerebrovascular Disease Paternal Grandmother      Diabetes Brother      Allergies Sister      Depression Sister      Gynecology Sister      Coronary Artery Disease No family hx of      Hyperlipidemia No family hx of      Breast Cancer No family hx of      Colon Cancer No family hx of      Prostate Cancer No family hx of      Other Cancer No family hx of      Anxiety Disorder No family hx of      Mental Illness No family hx of      Substance Abuse No family hx of      Anesthesia Reaction No family hx of      Osteoporosis No family hx of      Genetic Disorder No family hx of      Thyroid Disease No family hx of      Obesity No family hx of      Unknown/Adopted No family hx of        VASCULAR FAMILY HISTORY  1st order relative with atherosclerotic PAD: ? Father with bad circulation   1st order relative with AAA: No  Family history of Familial Hyperlipidemia No  Family History of Hypercoagulable state:No    VASCULAR RISK FACTORS  1. Diabetes:Yes controlled  2. Smoking: has never smoked.  3. HTN: uncontrolled  4.Hyperlipidemia: Yes - uncontrolled      SOCIAL HISTORY  Social History     Socioeconomic History     Marital status:      Spouse name: Not on file     Number of children: 3     Years of education: Not on file     Highest education level: Not on file   Social Needs     Financial resource strain: Not on file     Food insecurity - worry: Not on file     Food insecurity - inability: Not on file     Transportation needs - medical: Not on file     Transportation needs - non-medical: Not on file   Occupational History      Employer: [employer]   Tobacco Use     Smoking status: Never Smoker     Smokeless tobacco: Never Used   Substance and Sexual Activity     Alcohol use: No     Alcohol/week: 0.0 oz     Drug use: No     Sexual activity: No   Other Topics Concern     Parent/sibling w/ CABG, MI or angioplasty before 65F 55M? No   Social History Narrative    Social Documentation:05/27/2010        Balanced Diet: NO    Calcium intake: 3-4 per day    Caffeine: 2 per day    Exercise:  type of activity NO    Sunscreen: Yes    Seatbelts:  Yes    Self Breast Exam:  No - na    Self Testicular Exam: no    Physical/Emotional/Sexual Abuse: No     Do you feel safe in your environment? Yes        Cholesterol screen up to date: Yes    Eye Exam up to date: Yes    Dental Exam up to date: Yes    Pap smear up to date: Does Not Apply    Mammogram up to date: Does Not Apply    Dexa Scan up to date:NO    Colonoscopy up to date:2007    Immunizations up to date: YES    Glucose screen if over 40: Yes        Sofi Rosas CMA                   ROS:   General: No change in weight, sleep or appetite.  Normal energy.  No fever or chills  Eyes: Negative for vision changes or eye problems  ENT: No problems with ears, nose or throat.  No difficulty swallowing.  Resp: No coughing, wheezing or shortness of breath  CV: No chest pains or palpitations  GI: No nausea, vomiting,  heartburn, abdominal pain, diarrhea, constipation or change in bowel habits  : No urinary frequency or dysuria, bladder or kidney problems  Musculoskeletal: No significant muscle or joint pains  Neurologic: No headaches, numbness, tingling, weakness, problems with balance or coordination  Psychiatric: No problems with anxiety, depression or mental health  Heme/immune/allergy: No history of bleeding or clotting problems or anemia.  No allergies or immune system problems  Endocrine: No history of thyroid disease, diabetes or other endocrine disorders  Skin: circular crusted ulcer 3x3 cm size  right lateral aspect of distal portion of the leg, no signs of infection  Vascular: No claudication symptoms, no previous leg surgeries, getting edema therapy below lymphedema clinic    EXAM:  BP (!) 166/107 (BP Location: Left arm, Patient Position: Chair, Cuff Size: Adult Regular)   Pulse 94   Wt 243 lb 3.2 oz (110.3 kg)   SpO2 96%   BMI 38.09 kg/m    In general, the patient is a pleasant male in no apparent distress.    HEENT: NC/AT.  PERRLA.  EOMI.  Sclerae white, not injected.  Nares clear.  Pharynx without erythema or exudate.  Dentition intact.    Neck: No adenopathy.  No thyromegaly. Carotids +2/2 bilaterally without bruits.  No jugular venous distension.   Heart: RRR. Normal S1, S2 splits physiologically. No murmur, rub, click, or gallop. The PMI is in the 5th ICS in the midclavicular line. There is no heave.    Lungs: CTA.  No ronchi, wheezes, rales.  No dullness to percussion.   Abdomen: Soft, nontender, nondistended. No organomegaly. No AAA.  No bruits.   Extremities: vascular:  Bilateral lower extremity trace edema, bilateral lower extremity varicose veins CEAP4 CVI, no venous ulceration  He has a circular 3 x 3 cm size crusted skin lesion lateral aspect of midportion of right leg.  Bilateral palpable and dopplerable multiphasic DP PT pulses.  Palpable popliteal and femoral pulses bilaterally  Athlete's foot bilaterally      Labs:  LIPID RESULTS:  Lab Results   Component Value Date    CHOL 109 05/16/2018    HDL 25 (L) 05/16/2018    LDL 27 05/16/2018    TRIG 286 (H) 05/16/2018    CHOLHDLRATIO 5.9 (H) 09/01/2015       LIVER ENZYME RESULTS:  Lab Results   Component Value Date    AST 18 02/13/2019    ALT 25 02/13/2019       CBC RESULTS:  Lab Results   Component Value Date    WBC 4.0 02/13/2019    RBC 3.22 (L) 02/13/2019    HGB 10.2 (L) 02/13/2019    HCT 30.8 (L) 02/13/2019    MCV 96 02/13/2019    MCH 31.7 02/13/2019    MCHC 33.1 02/13/2019    RDW 14.7 02/13/2019     (L) 02/13/2019       BMP  RESULTS:  Lab Results   Component Value Date     02/13/2019    POTASSIUM 4.8 02/13/2019    CHLORIDE 115 (H) 02/13/2019    CO2 22 02/13/2019    ANIONGAP 6 02/13/2019     (H) 02/13/2019    BUN 21 02/13/2019    CR 0.83 02/13/2019    GFRESTIMATED >90 02/13/2019    GFRESTBLACK >90 02/13/2019    LUIS 8.3 (L) 02/13/2019        A1C RESULTS:  Lab Results   Component Value Date    A1C 5.3 12/10/2018       THYROID RESULTS:  Lab Results   Component Value Date    TSH 1.75 08/28/2018         Assessment and Plan:     1. Ulcer of right lower extremity, limited to breakdown of skin (H)  2. Varicose veins of bilateral lower extremities with other complications    Developed right lateral aspect of midportion of leg circular crusted ulcer 3 x 3 cm size approximately, no signs of cellulitis.  No injury.  He is obese with history of well-controlled diabetes and also history of amyloidosis status post stem cell transplant at [facility] in 2016 now with evolving pancytopenia and elevated MCV ?  May be developing secondary malignancy.  Scheduled to undergo bone marrow biopsy next week.  He has no claudication symptoms.  Excellent palpable pulses and bedside Dopplers revealed multiphasic pedal pulses.  Getting lymphedema therapy  Clinical exam consistent with CEAP 4 CVI bilaterally.  He also has a stiffness of fingers and family history of father with scleroderma.  No cutaneous scleroderma signs on exam, no history of inflammatory bowel disease.  We will arrange bilateral lower extremity [facility]  Treat blood pressure and edema, use compression and follow edema therapy recommendations  Check limited rheumatological labs as delineated below.  Reevaluate in 2 weeks    - Centromere Antibody IgG; Future  - Cyclic Citrullinated Peptide Antibody IgG; Future  - Scleroderma Antibody Scl70 KENA IgG; Future  - Rheumatoid factor; Future  - Anti Nuclear Katherine IgG by IFA with Reflex; Future          3. Benign  essential hypertension  He has a bilateral lower extremity edema, with chronic venous insufficiency and high blood pressure in the clinic today 168/100 on right side and 166/107 on the left side.  Has been taking lisinopril continue the same and add hydrochlorothiazide 25 mg daily in the morning  In the future this can be combined  Monitor blood pressure outside  Avoid NSAIDs  Lose weight  DASH diet discussed with the patient    - hydrochlorothiazide (HYDRODIURIL) 25 MG tablet; Take 1 tablet (25 mg) by mouth daily  Dispense: 30 tablet; Refill: 3    4. Mixed hyperlipidemia  Has a previous history of severe mixed hyperlipidemia both elevated total cholesterol LDL and triglycerides recently improved with adjustment of medications and taking atorvastatin continue the same.    5. NORMA (obstructive sleep apnea)  He has been using CPAP machine continue the same.    6. Type 2 diabetes mellitus without complication, without long-term current use of insulin (H)  Well-controlled with current lifestyle modification, recent hemoglobin A1c excellent range continue the same plan    7. Amyloidosis, unspecified type (H) s/p Stem cell tranplant at [place] 2016   Has been following up with the [facility] and managed by Dr. Castro, continue the same plan  Scheduled to undergo bone marrow biopsy next week for evolving pancytopenia and elevated MCV    This note was dictated by utilizing dragon software  Thank you for the consultation    Total patient care time spent today more than 60 minutes face-to-face, reviewed outside records and updated chart with the patient in the exam room.  He is new to this clinic.    Copy of this dictation to primary care physician and referring physician      Rodrick Ferrer MD,[facility]  Vascular Medicine

## 2019-02-15 NOTE — PATIENT INSTRUCTIONS
Erickson to follow up with Primary Care provider regarding elevated blood pressure.    Please go for [facility] at [facility]    Labs today     Take hydrochlorothiazide 25 mg daily in the morning

## 2019-02-16 LAB
CENTROMERE IGG SER-ACNC: <0.2 AI (ref 0–0.9)
ENA SCL70 IGG SER IA-ACNC: <0.2 AI (ref 0–0.9)

## 2019-02-18 ENCOUNTER — APPOINTMENT (OUTPATIENT)
Dept: VASCULAR SURGERY | Facility: CLINIC | Age: 64
End: 2019-02-18
Payer: COMMERCIAL

## 2019-02-18 ENCOUNTER — TRANSFERRED RECORDS (OUTPATIENT)
Dept: HEALTH INFORMATION MANAGEMENT | Facility: CLINIC | Age: 64
End: 2019-02-18

## 2019-02-18 LAB
ANA SER QL IF: NEGATIVE
CCP AB SER IA-ACNC: 1 U/ML
RHEUMATOID FACT SER NEPH-ACNC: <20 IU/ML (ref 0–20)

## 2019-02-18 PROCEDURE — 93970 EXTREMITY STUDY: CPT | Performed by: SURGERY

## 2019-02-20 ENCOUNTER — ANESTHESIA EVENT (OUTPATIENT)
Dept: GASTROENTEROLOGY | Facility: CLINIC | Age: 64
End: 2019-02-20
Payer: COMMERCIAL

## 2019-02-20 ENCOUNTER — ANESTHESIA (OUTPATIENT)
Dept: GASTROENTEROLOGY | Facility: CLINIC | Age: 64
End: 2019-02-20
Payer: COMMERCIAL

## 2019-02-20 ENCOUNTER — HOSPITAL ENCOUNTER (OUTPATIENT)
Facility: CLINIC | Age: 64
Discharge: HOME | End: 2019-02-20
Attending: PATHOLOGY | Admitting: PATHOLOGY
Payer: COMMERCIAL

## 2019-02-20 VITALS
SYSTOLIC BLOOD PRESSURE: 137 MMHG | OXYGEN SATURATION: 98 % | DIASTOLIC BLOOD PRESSURE: 92 MMHG | HEART RATE: 75 BPM | RESPIRATION RATE: 10 BRPM

## 2019-02-20 DIAGNOSIS — E85.89 OTHER AMYLOIDOSIS (H): Primary | ICD-10-CM

## 2019-02-20 LAB
BASOPHILS # BLD AUTO: 0 10E9/L (ref 0–0.2)
BASOPHILS NFR BLD AUTO: 0.4 %
DIFFERENTIAL METHOD BLD: ABNORMAL
EOSINOPHIL # BLD AUTO: 0.2 10E9/L (ref 0–0.7)
EOSINOPHIL NFR BLD AUTO: 2.3 %
ERYTHROCYTE [DISTWIDTH] IN BLOOD BY AUTOMATED COUNT: 14.7 % (ref 10–15)
HCT VFR BLD AUTO: 33.1 % (ref 40–53)
HGB BLD-MCNC: 11.1 G/DL (ref 13.3–17.7)
IMM GRANULOCYTES # BLD: 0 10E9/L (ref 0–0.4)
IMM GRANULOCYTES NFR BLD: 0.4 %
LYMPHOCYTES # BLD AUTO: 0.8 10E9/L (ref 0.8–5.3)
LYMPHOCYTES NFR BLD AUTO: 10.2 %
MCH RBC QN AUTO: 31.9 PG (ref 26.5–33)
MCHC RBC AUTO-ENTMCNC: 33.5 G/DL (ref 31.5–36.5)
MCV RBC AUTO: 95 FL (ref 78–100)
MONOCYTES # BLD AUTO: 0.6 10E9/L (ref 0–1.3)
MONOCYTES NFR BLD AUTO: 7.8 %
NEUTROPHILS # BLD AUTO: 5.9 10E9/L (ref 1.6–8.3)
NEUTROPHILS NFR BLD AUTO: 78.9 %
NRBC # BLD AUTO: 0 10*3/UL
NRBC BLD AUTO-RTO: 0 /100
PLATELET # BLD AUTO: 120 10E9/L (ref 150–450)
RBC # BLD AUTO: 3.48 10E12/L (ref 4.4–5.9)
RETICS # AUTO: 107.5 10E9/L (ref 25–95)
RETICS/RBC NFR AUTO: 3.1 % (ref 0.5–2)
WBC # BLD AUTO: 7.4 10E9/L (ref 4–11)

## 2019-02-20 PROCEDURE — 40000948 ZZHCL STATISTIC BONE MARROW ASP TC 85097: Performed by: INTERNAL MEDICINE

## 2019-02-20 PROCEDURE — 38221 DX BONE MARROW BIOPSIES: CPT | Performed by: INTERNAL MEDICINE

## 2019-02-20 PROCEDURE — 88313 SPECIAL STAINS GROUP 2: CPT | Performed by: INTERNAL MEDICINE

## 2019-02-20 PROCEDURE — 88311 DECALCIFY TISSUE: CPT | Mod: 26 | Performed by: INTERNAL MEDICINE

## 2019-02-20 PROCEDURE — 40000847 ZZHCL STATISTIC MORPHOLOGY W/INTERP HISTOLOGY TC 85060: Performed by: INTERNAL MEDICINE

## 2019-02-20 PROCEDURE — 25800030 ZZH RX IP 258 OP 636: Performed by: NURSE ANESTHETIST, CERTIFIED REGISTERED

## 2019-02-20 PROCEDURE — 88305 TISSUE EXAM BY PATHOLOGIST: CPT | Mod: 26 | Performed by: INTERNAL MEDICINE

## 2019-02-20 PROCEDURE — 88342 IMHCHEM/IMCYTCHM 1ST ANTB: CPT | Mod: 26 | Performed by: INTERNAL MEDICINE

## 2019-02-20 PROCEDURE — 00000058 ZZHCL STATISTIC BONE MARROW ASP PERF TC 38220: Performed by: INTERNAL MEDICINE

## 2019-02-20 PROCEDURE — 85045 AUTOMATED RETICULOCYTE COUNT: CPT | Performed by: PATHOLOGY

## 2019-02-20 PROCEDURE — 88280 CHROMOSOME KARYOTYPE STUDY: CPT | Performed by: INTERNAL MEDICINE

## 2019-02-20 PROCEDURE — 88264 CHROMOSOME ANALYSIS 20-25: CPT | Performed by: INTERNAL MEDICINE

## 2019-02-20 PROCEDURE — 88275 CYTOGENETICS 100-300: CPT | Performed by: INTERNAL MEDICINE

## 2019-02-20 PROCEDURE — 88185 FLOWCYTOMETRY/TC ADD-ON: CPT | Performed by: PATHOLOGY

## 2019-02-20 PROCEDURE — 38222 DX BONE MARROW BX & ASPIR: CPT | Performed by: PATHOLOGY

## 2019-02-20 PROCEDURE — 40000010 ZZH STATISTIC ANES STAT CODE-CRNA PER MINUTE: Performed by: PATHOLOGY

## 2019-02-20 PROCEDURE — 85097 BONE MARROW INTERPRETATION: CPT | Performed by: INTERNAL MEDICINE

## 2019-02-20 PROCEDURE — 25000128 H RX IP 250 OP 636: Performed by: NURSE ANESTHETIST, CERTIFIED REGISTERED

## 2019-02-20 PROCEDURE — 37000009 ZZH ANESTHESIA TECHNICAL FEE, EACH ADDTL 15 MIN: Performed by: PATHOLOGY

## 2019-02-20 PROCEDURE — 88311 DECALCIFY TISSUE: CPT | Performed by: INTERNAL MEDICINE

## 2019-02-20 PROCEDURE — 40000795 ZZHCL STATISTIC DNA PROCESS AND HOLD: Performed by: PATHOLOGY

## 2019-02-20 PROCEDURE — 36415 COLL VENOUS BLD VENIPUNCTURE: CPT | Performed by: PATHOLOGY

## 2019-02-20 PROCEDURE — 88313 SPECIAL STAINS GROUP 2: CPT | Mod: 26 | Performed by: INTERNAL MEDICINE

## 2019-02-20 PROCEDURE — 88237 TISSUE CULTURE BONE MARROW: CPT | Performed by: INTERNAL MEDICINE

## 2019-02-20 PROCEDURE — 85060 BLOOD SMEAR INTERPRETATION: CPT | Performed by: INTERNAL MEDICINE

## 2019-02-20 PROCEDURE — 37000008 ZZH ANESTHESIA TECHNICAL FEE, 1ST 30 MIN: Performed by: PATHOLOGY

## 2019-02-20 PROCEDURE — 88341 IMHCHEM/IMCYTCHM EA ADD ANTB: CPT | Mod: 26 | Performed by: INTERNAL MEDICINE

## 2019-02-20 PROCEDURE — 40001004 ZZHCL STATISTIC FLOW INT 9-15 ABY TC 88188: Performed by: PATHOLOGY

## 2019-02-20 PROCEDURE — 25000125 ZZHC RX 250: Performed by: NURSE ANESTHETIST, CERTIFIED REGISTERED

## 2019-02-20 PROCEDURE — 88305 TISSUE EXAM BY PATHOLOGIST: CPT | Performed by: INTERNAL MEDICINE

## 2019-02-20 PROCEDURE — 88271 CYTOGENETICS DNA PROBE: CPT | Performed by: INTERNAL MEDICINE

## 2019-02-20 PROCEDURE — 00000008 ZZHCL STATISTIC ADDL BM ASP PERF PF 38220: Performed by: INTERNAL MEDICINE

## 2019-02-20 PROCEDURE — 85025 COMPLETE CBC W/AUTO DIFF WBC: CPT | Performed by: PATHOLOGY

## 2019-02-20 PROCEDURE — 40000424 ZZHCL STATISTIC BONE MARROW CORE PERF TC 38221: Performed by: INTERNAL MEDICINE

## 2019-02-20 PROCEDURE — 88342 IMHCHEM/IMCYTCHM 1ST ANTB: CPT | Mod: XU | Performed by: INTERNAL MEDICINE

## 2019-02-20 PROCEDURE — 00000159 ZZHCL STATISTIC H-SEND OUTS PREP: Performed by: INTERNAL MEDICINE

## 2019-02-20 PROCEDURE — 88184 FLOWCYTOMETRY/ TC 1 MARKER: CPT | Performed by: PATHOLOGY

## 2019-02-20 PROCEDURE — 38221 DX BONE MARROW BIOPSIES: CPT | Performed by: PATHOLOGY

## 2019-02-20 PROCEDURE — 88341 IMHCHEM/IMCYTCHM EA ADD ANTB: CPT | Performed by: INTERNAL MEDICINE

## 2019-02-20 PROCEDURE — 38222 DX BONE MARROW BX & ASPIR: CPT | Performed by: INTERNAL MEDICINE

## 2019-02-20 RX ORDER — LIDOCAINE HYDROCHLORIDE 20 MG/ML
INJECTION, SOLUTION INFILTRATION; PERINEURAL PRN
Status: DISCONTINUED | OUTPATIENT
Start: 2019-02-20 | End: 2019-02-20

## 2019-02-20 RX ORDER — SODIUM CHLORIDE, SODIUM LACTATE, POTASSIUM CHLORIDE, CALCIUM CHLORIDE 600; 310; 30; 20 MG/100ML; MG/100ML; MG/100ML; MG/100ML
INJECTION, SOLUTION INTRAVENOUS CONTINUOUS PRN
Status: DISCONTINUED | OUTPATIENT
Start: 2019-02-20 | End: 2019-02-20

## 2019-02-20 RX ORDER — LIDOCAINE HYDROCHLORIDE 10 MG/ML
8-10 INJECTION, SOLUTION EPIDURAL; INFILTRATION; INTRACAUDAL; PERINEURAL
Status: DISCONTINUED | OUTPATIENT
Start: 2019-02-20 | End: 2019-02-20 | Stop reason: HOSPADM

## 2019-02-20 RX ORDER — NALOXONE HYDROCHLORIDE 0.4 MG/ML
.1-.4 INJECTION, SOLUTION INTRAMUSCULAR; INTRAVENOUS; SUBCUTANEOUS
Status: DISCONTINUED | OUTPATIENT
Start: 2019-02-20 | End: 2019-02-20 | Stop reason: HOSPADM

## 2019-02-20 RX ORDER — FLUMAZENIL 0.1 MG/ML
0.2 INJECTION, SOLUTION INTRAVENOUS
Status: DISCONTINUED | OUTPATIENT
Start: 2019-02-20 | End: 2019-02-20 | Stop reason: HOSPADM

## 2019-02-20 RX ORDER — PROPOFOL 10 MG/ML
INJECTION, EMULSION INTRAVENOUS CONTINUOUS PRN
Status: DISCONTINUED | OUTPATIENT
Start: 2019-02-20 | End: 2019-02-20

## 2019-02-20 RX ADMIN — PROPOFOL 200 MCG/KG/MIN: 10 INJECTION, EMULSION INTRAVENOUS at 09:07

## 2019-02-20 RX ADMIN — LIDOCAINE HYDROCHLORIDE 40 MG: 20 INJECTION, SOLUTION INFILTRATION; PERINEURAL at 09:07

## 2019-02-20 RX ADMIN — SODIUM CHLORIDE, POTASSIUM CHLORIDE, SODIUM LACTATE AND CALCIUM CHLORIDE: 600; 310; 30; 20 INJECTION, SOLUTION INTRAVENOUS at 09:06

## 2019-02-20 NOTE — ANESTHESIA PREPROCEDURE EVALUATION
Anesthesia Pre-Procedure Evaluation    Patient: Parmjit Hernández   MRN: [id number] : 1955          Preoperative Diagnosis: AMYLOIDOSIS    Procedure(s):  BIOPSY BONE MARROW    Past Medical History:   Diagnosis Date     Allergic state      Amyloidosis (H)      Diabetes mellitus (H)     type 2     Headache(784.0)      Hypertension      Myalgia and myositis, unspecified      Obsessive-compulsive disorders      Other acquired absence of organ 94     Other specified viral warts      Pain in the abdomen      Pure hypercholesterolemia      Sleep apnea      Past Surgical History:   Procedure Laterality Date     BONE MARROW BIOPSY, BONE SPECIMEN, NEEDLE/TROCAR N/A 2016    Procedure: BIOPSY BONE MARROW;  Surgeon: Nathan Agrawal MD;  Location:  GI     C NONSPECIFIC PROCEDURE      Cholecystectomy     C NONSPECIFIC PROCEDURE      repair deviated septum     CHOLECYSTECTOMY      lap qian     COLONOSCOPY      hx polyps     ESOPHAGOSCOPY, GASTROSCOPY, DUODENOSCOPY (EGD), COMBINED N/A 2015    Procedure: COMBINED ESOPHAGOSCOPY, GASTROSCOPY, DUODENOSCOPY (EGD), BIOPSY SINGLE OR MULTIPLE;  Surgeon: John Jacob MD;  Location:  GI     HERNIA REPAIR, UMBILICAL         Anesthesia Evaluation     .             ROS/MED HX    ENT/Pulmonary:     (+)sleep apnea, uses CPAP , . .    Neurologic:       Cardiovascular:     (+) hypertension----. : . . . :. .       METS/Exercise Tolerance:     Hematologic:         Musculoskeletal:         GI/Hepatic:         Renal/Genitourinary:         Endo:     (+) type II DM Obesity, .      Psychiatric:         Infectious Disease:         Malignancy:         Other:                          Physical Exam  Normal systems: cardiovascular, pulmonary and dental    Airway   Mallampati: II    Dental     Cardiovascular       Pulmonary             Lab Results   Component Value Date    WBC 4.0 2019    HGB 10.2 (L) 2019    HCT 30.8 (L) 2019    PLT  "112 (L) 02/13/2019    CRP 6.6 08/28/2018    SED 9 08/28/2018     02/13/2019    POTASSIUM 4.8 02/13/2019    CHLORIDE 115 (H) 02/13/2019    CO2 22 02/13/2019    BUN 21 02/13/2019    CR 0.83 02/13/2019     (H) 02/13/2019    LUIS 8.3 (L) 02/13/2019    ALBUMIN 3.2 (L) 02/13/2019    PROTTOTAL 6.4 (L) 02/13/2019    ALT 25 02/13/2019    AST 18 02/13/2019     (H) 02/12/2016    ALKPHOS 107 02/13/2019    BILITOTAL 0.3 02/13/2019    LIPASE 54 (L) 01/29/2018    AMYLASE 33 09/17/2016    PTT 27 01/27/2018    INR 1.06 01/27/2018    TSH 1.75 08/28/2018       Preop Vitals  BP Readings from Last 3 Encounters:   02/20/19 (!) 158/95   02/15/19 (!) 166/107   02/14/19 (!) 147/92    Pulse Readings from Last 3 Encounters:   02/15/19 94   02/14/19 92   02/13/19 86      Resp Readings from Last 3 Encounters:   02/20/19 16   02/04/19 16   01/17/19 16    SpO2 Readings from Last 3 Encounters:   02/20/19 96%   02/15/19 96%   02/14/19 96%      Temp Readings from Last 1 Encounters:   02/13/19 36.3  C (97.4  F) (Oral)    Ht Readings from Last 1 Encounters:   02/13/19 1.702 m (5' 7\")      Wt Readings from Last 1 Encounters:   02/15/19 110.3 kg (243 lb 3.2 oz)    Estimated body mass index is 38.09 kg/m  as calculated from the following:    Height as of 2/13/19: 1.702 m (5' 7\").    Weight as of 2/15/19: 110.3 kg (243 lb 3.2 oz).       Anesthesia Plan      History & Physical Review  History and physical reviewed and following examination; no interval change.    ASA Status:  3 .    NPO Status:  > 8 hours    Plan for MAC with Intravenous and Propofol induction.          Postoperative Care      Consents  Anesthetic plan, risks, benefits and alternatives discussed with:  Patient..                 Kt Fink MD  "

## 2019-02-20 NOTE — ANESTHESIA POSTPROCEDURE EVALUATION
Patient: Parmjit Hernández    Procedure(s):  BIOPSY BONE MARROW    Diagnosis:AMYLOIDOSIS  Diagnosis Additional Information: No value filed.    Anesthesia Type:  MAC    Note:  Anesthesia Post Evaluation    Patient location during evaluation: Phase 2  Patient participation: Able to fully participate in evaluation  Level of consciousness: awake and alert  Pain management: adequate  Airway patency: patent  Cardiovascular status: acceptable  Respiratory status: acceptable and unassisted  Hydration status: acceptable  PONV: none             Last vitals:  Vitals:    02/20/19 0841   BP: (!) 158/95   Resp: 16   SpO2: 96%         Electronically Signed By: Kt Fink MD  February 20, 2019  9:54 AM

## 2019-02-20 NOTE — PROCEDURES
The patient was positively identified and informed consent was obtained (see the completed Affirmation of Consent for Bone Marrow Aspiration and/or Biopsy Procedure(s) form in the patient's chart). The patient was placed in the prone position and the bony landmarks of the pelvis were identified. Medical staff reconfirmed the patient's name, date of birth and procedure. The skin over the posterior iliac crest was scrubbed and draped in a sterile fashion. The local area of the procedure was anesthetized with a total of 8 mL of 1% Lidocaine and a small incision was made.  The patient did receive conscious sedation.    Trephine bone marrow core(s) was/were obtained from the left posterior iliac crest. Bone marrow aspirate was obtained from the left posterior iliac crest for: morphology with possible immunophenotyping and/or cytogenetics and molecular diagnostics    Direct pressure was applied to the biopsy site with sterile gauze. The biopsy site was cleaned with alcohol and a sterile dressing was placed over the biopsy incision using a pressure bandage. The patient was then placed in the supine position to maintain pressure on the biopsy site. Post-procedure wound care instructions, including routine dressing instructions and analgesia, were given to the patient. The procedure was completed without complication.

## 2019-02-20 NOTE — ANESTHESIA CARE TRANSFER NOTE
Patient: Parmjit Hernández    Procedure(s):  BIOPSY BONE MARROW    Diagnosis: AMYLOIDOSIS  Diagnosis Additional Information: No value filed.    Anesthesia Type:   MAC     Note:  Airway :Nasal Cannula  Patient transferred to:PACU  Handoff Report: Identifed the Patient, Identified the Reponsible Provider, Reviewed the pertinent medical history, Discussed the surgical course, Reviewed Intra-OP anesthesia mangement and issues during anesthesia, Set expectations for post-procedure period and Allowed opportunity for questions and acknowledgement of understanding      Vitals: (Last set prior to Anesthesia Care Transfer)    CRNA VITALS  2/20/2019 0905 - 2/20/2019 0939      2/20/2019             Resp Rate (set):  10                Electronically Signed By: JUNIOR Robertson CRNA  February 20, 2019  9:39 AM

## 2019-02-21 LAB
COPATH REPORT: NORMAL
COPATH REPORT: NORMAL

## 2019-02-22 ENCOUNTER — OFFICE VISIT (OUTPATIENT)
Dept: FAMILY MEDICINE | Facility: CLINIC | Age: 64
End: 2019-02-22
Payer: COMMERCIAL

## 2019-02-22 VITALS
RESPIRATION RATE: 16 BRPM | BODY MASS INDEX: 37.49 KG/M2 | WEIGHT: 238.9 LBS | TEMPERATURE: 98.2 F | HEART RATE: 107 BPM | OXYGEN SATURATION: 100 % | SYSTOLIC BLOOD PRESSURE: 131 MMHG | DIASTOLIC BLOOD PRESSURE: 84 MMHG | HEIGHT: 67 IN

## 2019-02-22 DIAGNOSIS — E85.89 OTHER AMYLOIDOSIS (H): ICD-10-CM

## 2019-02-22 DIAGNOSIS — D53.9 MACROCYTIC ANEMIA: ICD-10-CM

## 2019-02-22 DIAGNOSIS — E11.9 TYPE 2 DIABETES MELLITUS WITHOUT COMPLICATION, WITHOUT LONG-TERM CURRENT USE OF INSULIN (H): ICD-10-CM

## 2019-02-22 DIAGNOSIS — I87.2 STASIS DERMATITIS OF BOTH LEGS: ICD-10-CM

## 2019-02-22 DIAGNOSIS — G47.33 OSA (OBSTRUCTIVE SLEEP APNEA): ICD-10-CM

## 2019-02-22 DIAGNOSIS — I10 BENIGN ESSENTIAL HYPERTENSION: Primary | ICD-10-CM

## 2019-02-22 DIAGNOSIS — G63 POLYNEUROPATHY ASSOCIATED WITH UNDERLYING DISEASE (H): ICD-10-CM

## 2019-02-22 DIAGNOSIS — G47.19 EXCESSIVE DAYTIME SLEEPINESS: ICD-10-CM

## 2019-02-22 DIAGNOSIS — I89.0 ACQUIRED LYMPHEDEMA: ICD-10-CM

## 2019-02-22 DIAGNOSIS — I83.009 VENOUS ULCER (H): ICD-10-CM

## 2019-02-22 DIAGNOSIS — L97.909 VENOUS ULCER (H): ICD-10-CM

## 2019-02-22 LAB — COPATH REPORT: NORMAL

## 2019-02-22 PROCEDURE — 99214 OFFICE O/P EST MOD 30 MIN: CPT | Performed by: FAMILY MEDICINE

## 2019-02-22 RX ORDER — LISINOPRIL 40 MG/1
40 TABLET ORAL DAILY
Qty: 90 TABLET | Refills: 3 | Status: CANCELLED | OUTPATIENT
Start: 2019-02-22 | End: 2020-02-22

## 2019-02-22 RX ORDER — GLIPIZIDE 2.5 MG/1
2.5 TABLET, EXTENDED RELEASE ORAL DAILY
Qty: 30 TABLET | Refills: 11 | Status: ON HOLD | OUTPATIENT
Start: 2019-02-22 | End: 2019-05-11

## 2019-02-22 ASSESSMENT — MIFFLIN-ST. JEOR: SCORE: 1842.27

## 2019-02-22 NOTE — PROGRESS NOTES
SUBJECTIVE:   Parmjit Hernández is a 63 year old male who presents to clinic today for the following health issues:      Chief Complaint   Patient presents with     RECHECK     Would like to check in regarding his BP     Patient presents to clinic to address multiple concerns.  He was noted to have mildly elevated blood pressures in his last few clinic appointments.  He was recently started on hydrochlorothiazide 25 mg.  Current blood pressure has decreased from last visit.    Also, the patient has a long history of type 2 diabetes.  Blood glucose has improved significantly.  Current blood glucose readings are in the 120s.    He was also noted to have a venous ulcer.  He was seen and evaluated by vascular and was informed that he has venous insufficiency.  Also healed without new concerns.    Medical history is also significant for polyneuropathy.  The patient currently takes 300 mg of Lyrica daily.  Desires to have an increase in the dose but would like to discuss it with his pain specialist first.  He was also noted to have excessive daytime drowsiness.  The patient was recently started on modafinil.  Reports significant improvement in symptoms.    Medical history is also significant for anemia.  Due to the patient's history of amyloidosis.  He is currently seeing hematology oncology.  He underwent a bone marrow biopsy results of the bone marrow biopsy are still pending.    Problem list and histories reviewed & adjusted, as indicated.  Additional history: as documented    Patient Active Problem List   Diagnosis     Obstructive sleep apnea     HYPERLIPIDEMIA LDL GOAL <100     Hypertension goal BP (blood pressure) < 140/90     Advanced directives, counseling/discussion     Migraine     History of diverticulitis     MENTAL HEALTH     Marianne (H)     Bipolar I disorder (H)     Chronic abdominal pain     Anxiety     Other amyloidosis (H)     Insomnia due to medical condition     Narcotic dependence (H)     Obstructive  sleep apnea syndrome     Type 2 diabetes mellitus without complication, without long-term current use of insulin (H)     Restless legs syndrome (RLS)     Type 2 diabetes mellitus with other specified complication (H)     Peripheral edema     Morbid obesity (H)     Stasis dermatitis of both legs     Polyneuropathy associated with underlying disease (H)     Acquired lymphedema     Past Surgical History:   Procedure Laterality Date     BONE MARROW BIOPSY, BONE SPECIMEN, NEEDLE/TROCAR N/A 6/8/2016    Procedure: BIOPSY BONE MARROW;  Surgeon: Nathan Agrawal MD;  Location:  GI     BONE MARROW BIOPSY, BONE SPECIMEN, NEEDLE/TROCAR N/A 2/20/2019    Procedure: BIOPSY BONE MARROW;  Surgeon: Michael Raygoza MD;  Location:  GI     C NONSPECIFIC PROCEDURE  94    Cholecystectomy     C NONSPECIFIC PROCEDURE  2000    repair deviated septum     CHOLECYSTECTOMY  1995    lap qian     COLONOSCOPY  2012    hx polyps     ESOPHAGOSCOPY, GASTROSCOPY, DUODENOSCOPY (EGD), COMBINED N/A 5/29/2015    Procedure: COMBINED ESOPHAGOSCOPY, GASTROSCOPY, DUODENOSCOPY (EGD), BIOPSY SINGLE OR MULTIPLE;  Surgeon: John Jacob MD;  Location:  GI     HERNIA REPAIR, UMBILICAL  2006       Social History     Tobacco Use     Smoking status: Never Smoker     Smokeless tobacco: Never Used   Substance Use Topics     Alcohol use: No     Alcohol/week: 0.0 oz     Family History   Problem Relation Age of Onset     Cerebrovascular Disease Mother      C.A.D. Mother      Hypertension Mother      Alcohol/Drug Mother      Arthritis Mother      Cerebrovascular Disease Maternal Grandmother      C.A.D. Maternal Grandmother      Alcohol/Drug Maternal Grandmother      C.A.D. Father      Heart Disease Father      Hypertension Father      Alcohol/Drug Father      Allergies Father      Circulatory Father      Depression Father      Respiratory Father      Asthma Father      Alcohol/Drug Paternal Grandfather      Cerebrovascular Disease Paternal  "Grandfather      Depression Son      Psychotic Disorder Son         anxiety disorder     Depression Daughter      Cerebrovascular Disease Maternal Grandfather      Cerebrovascular Disease Paternal Grandmother      Diabetes Brother      Allergies Sister      Depression Sister      Gynecology Sister      Coronary Artery Disease No family hx of      Hyperlipidemia No family hx of      Breast Cancer No family hx of      Colon Cancer No family hx of      Prostate Cancer No family hx of      Other Cancer No family hx of      Anxiety Disorder No family hx of      Mental Illness No family hx of      Substance Abuse No family hx of      Anesthesia Reaction No family hx of      Osteoporosis No family hx of      Genetic Disorder No family hx of      Thyroid Disease No family hx of      Obesity No family hx of      Unknown/Adopted No family hx of            Reviewed and updated as needed this visit by clinical staff  Tobacco  Allergies  Meds       Reviewed and updated as needed this visit by Provider         ROS:  Constitutional, HEENT, cardiovascular, pulmonary, gi and gu systems are negative, except as otherwise noted.    OBJECTIVE:     /84   Pulse 107   Temp 98.2  F (36.8  C) (Oral)   Resp 16   Ht 1.702 m (5' 7\")   Wt 108.4 kg (238 lb 14.4 oz)   SpO2 100%   BMI 37.42 kg/m    Body mass index is 37.42 kg/m .  GENERAL: healthy, alert and no distress  RESP: lungs clear to auscultation - no rales, rhonchi or wheezes  CV: regular rate and rhythm, normal S1 S2, no S3 or S4, no murmur, click or rub, no peripheral edema and peripheral pulses strong  MS: no edema in lower extremities. Lesion in right lower extremity is well healing. No signs of an acute infection.     Diagnostic Test Results:  none     ASSESSMENT/PLAN:   1. Benign essential hypertension  Assessment: Blood pressures well controlled at today's visit.  Patient was recently started on hydrochlorothiazide.  Plan:  -Continue with hydrochlorothiazide 25 mg " once daily.  -Continue with lisinopril 20 mg once daily.    2. Type 2 diabetes mellitus without complication, without long-term current use of insulin (H)  Assessment: Intermittent low blood glucose.  Decrease glipizide to 2.5 mg once daily.   Plan:  - glipiZIDE (GLUCOTROL XL) 2.5 MG 24 hr tablet; Take 1 tablet (2.5 mg) by mouth daily  Dispense: 30 tablet; Refill: 11    3. Venous ulcer (H)  Assessment and Plan: Well-healing ulcer.  Continue to monitor.    4. Acquired lymphedema  Resolved    5. Polyneuropathy associated with underlying disease (H)  Assessment and Plan: Polyneuropathy secondary to diabetes.  The patient is currently taking Lyrica 100 mg 3 times daily.  100 and increasing the dose of his advised to discuss it with his pain specialist.    6. Anemia  Assessment: Patient is currently seeing him for evaluation.  He underwent bone marrow biopsy.  Results of the biopsy are still pending.  Plan:    7. Excessive daytime sleepiness  Assessment: Patient was started on modafinil by his PCP.  Significant improvement in his daytime drowsiness.  Plan:    8. Other amyloidosis (H)  Same as above #6      9. Stasis dermatitis of both legs  Currently stable.    10. NORMA (obstructive sleep apnea)  Using a CPAP at bedtime.      Return to clinic after 1-2  months for follow-up visit.    Vince Henry MD  [facility]

## 2019-02-25 ENCOUNTER — TELEPHONE (OUTPATIENT)
Dept: FAMILY MEDICINE | Facility: CLINIC | Age: 64
End: 2019-02-25

## 2019-02-25 DIAGNOSIS — E11.9 TYPE 2 DIABETES MELLITUS WITHOUT COMPLICATION, WITHOUT LONG-TERM CURRENT USE OF INSULIN (H): ICD-10-CM

## 2019-02-25 DIAGNOSIS — G62.9 NEUROPATHY: Primary | ICD-10-CM

## 2019-02-25 DIAGNOSIS — G89.29 OTHER CHRONIC PAIN: ICD-10-CM

## 2019-02-25 RX ORDER — PREGABALIN 100 MG
100 CAPSULE ORAL 3 TIMES DAILY
Qty: 270 CAPSULE | Refills: 3 | Status: SHIPPED | OUTPATIENT
Start: 2019-02-25 | End: 2019-02-28

## 2019-02-25 RX ORDER — PREGABALIN 100 MG/1
100 CAPSULE ORAL 3 TIMES DAILY
Qty: 270 CAPSULE | Refills: 3 | Status: CANCELLED | OUTPATIENT
Start: 2019-02-25

## 2019-02-25 NOTE — TELEPHONE ENCOUNTER
I am in process of applying to the  Lyrica assistance program through [employer].  [employer] requires a hand signed, brand name, hard copy script be submitted with their application.    Please hand sign a BRAND name, hard copy script for:    LYRICA    Please send the script to me via interoffice mail [employer] Barb Navarro or via [place] mail  at:      [facility]   Barb Huerta   [address] [place]  [address]    Thanks so much for your help!    Barb Huerta  Prescription

## 2019-02-27 ENCOUNTER — HOSPITAL ENCOUNTER (OUTPATIENT)
Facility: CLINIC | Age: 64
Setting detail: SPECIMEN
Discharge: HOME | End: 2019-02-27
Attending: INTERNAL MEDICINE | Admitting: INTERNAL MEDICINE
Payer: COMMERCIAL

## 2019-02-27 ENCOUNTER — ONCOLOGY VISIT (OUTPATIENT)
Dept: ONCOLOGY | Facility: CLINIC | Age: 64
End: 2019-02-27
Attending: INTERNAL MEDICINE
Payer: COMMERCIAL

## 2019-02-27 VITALS
HEIGHT: 67 IN | WEIGHT: 233.5 LBS | OXYGEN SATURATION: 94 % | HEART RATE: 96 BPM | SYSTOLIC BLOOD PRESSURE: 112 MMHG | TEMPERATURE: 98.3 F | BODY MASS INDEX: 36.65 KG/M2 | DIASTOLIC BLOOD PRESSURE: 81 MMHG

## 2019-02-27 DIAGNOSIS — E85.89 OTHER AMYLOIDOSIS (H): Primary | ICD-10-CM

## 2019-02-27 PROCEDURE — 99213 OFFICE O/P EST LOW 20 MIN: CPT | Performed by: INTERNAL MEDICINE

## 2019-02-27 PROCEDURE — G0463 HOSPITAL OUTPT CLINIC VISIT: HCPCS

## 2019-02-27 RX ORDER — LURASIDONE HYDROCHLORIDE 20 MG/1
40 TABLET, FILM COATED ORAL AT BEDTIME
COMMUNITY
End: 2019-05-10

## 2019-02-27 ASSESSMENT — MIFFLIN-ST. JEOR: SCORE: 1817.78

## 2019-02-27 ASSESSMENT — PAIN SCALES - GENERAL: PAINLEVEL: NO PAIN (0)

## 2019-02-27 NOTE — PATIENT INSTRUCTIONS
1- RTC MD 2 months with labs before- CBC diff, CMP, SPEP, IFXN, light chains    MD released the patient

## 2019-02-27 NOTE — LETTER
2/27/2019         RE: Parmjit Hernández  [address]  [facility] [address]        Dear Colleague,    Thank you for referring your patient, Parmjit Hernández, to the [facility]. Please see a copy of my visit note below.    This clinic visit note has been dictated [phone]      [facility]  HEMATOLOGY ONCOLOGY    Visit Date:   02/27/2019      DIAGNOSES:     1. 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  In 06/2016, a 1.4 cm lytic lesion in the radial diaphysis and in calvarium.  Bone marrow biopsy 06/2016 normal cellular marrow, 50% cellularity, 5% plasma cells, 4% kappa monotypic plasma cells.  Staining positive for Congo red, normal cytogenetics, translocation 11;14, monosomal 13.  PET/CT scan 06/2016:  Lytic lesions were not identified.  Subcarinal lymph node was positive for amyloidosis treated with CyBorD starting from 08/2006, very good partial response with normalization of the light chain and mildly persistent elevation of the light chain ratio.  In 11/2016, proceeded with auto stem cell transplant and achieved complete hematological response.  All of this was accomplished at [facility] under the care of Dr. Ming Trinh.   2.  Chronic abdominal pain, unclear etiology.  Follows up with [facility].  In 12/2018, he had a pain pump placed.      TREATMENT:  Observation.      SUBJECTIVE:  The patient was seen as a followup today.  We reviewed the results of the bone marrow biopsy and discussed further plan of care.  He continues to be fatigued.  His tolerance to daily activities is improving.     REVIEW OF SYSTEMS:  A complete review of systems was performed and found to be negative other than pertinent positives mentioned in history of present illness.      Past medical, social histories  reviewed.     Meds- Reviewed.     PHYSICAL EXAMINATION:   VITAL SIGNS:  Blood pressure is 112/81, pulse 96, temperature 98.3.     CONSTITUTIONAL:  Sitting comfortably.   NEUROLOGIC:  Alert, awake.   PSYCHIATRIC:  Mood and affect appear normal.      LABORATORY DATA AND IMAGING REVIEWED DURING THIS VISIT:  Recent Labs   Lab Test 02/13/19  1410 01/17/19  1202    140   POTASSIUM 4.8 4.1   CHLORIDE 115* 107   CO2 22 22   ANIONGAP 6 11   BUN 21 26   CR 0.83 0.90   * 142*   LUIS 8.3* 8.9     Recent Labs   Lab Test 02/20/19  0853 02/13/19  1410 01/17/19  1202   WBC 7.4 4.0 6.1   HGB 11.1* 10.2* 9.3*   * 112* 170   MCV 95 96 103*   NEUTROPHIL 78.9 69.1 71.9     Recent Labs   Lab Test 02/13/19  1410 01/17/19  1202 09/16/18 08/28/18 2015 08/28/18  1259   BILITOTAL 0.3 0.3  --  1.7* 1.6*   ALKPHOS 107 86  --  78 75   ALT 25 15 32 30 28   AST 18 9 26 16 15   ALBUMIN 3.2* 3.3*  --  3.8 4.0     --   --   --  465*     ECOG PS: 1     ASSESSMENT:  A 63-year-old gentleman with AL kappa amyloidosis subtype in the lymph node and bone marrow.      On 02/13/2019, he was seen by me for initial evaluation for development of microcytic anemia.  We proceeded with additional workup, results of which were reviewed with the patient today.        LABORATORIES:  The patient had a normal B12, normal LDH, normal ferritin, normal folate.  His hemoglobin has in fact improved to 11.1 and his MCV has normalized.  He has mild thrombocytopenia with platelet count of 120,000.  Manpreet test was negative.  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  He proceeded with a bone marrow biopsy on 02/20/2019.  It shows normal cellular marrow with 50% cellularity, trilineage hematopoiesis with relative erythroid hyperplasia, 1% polytypic plasma cells, persistent amyloidosis, several foci of amyloid were present around small vessels.      I  discussed with the patient that given his hemoglobin is improving, the MCV has normalized, and also no significant abnormalities on bone marrow biopsy except for persistent amyloidosis, I would recommend observation at this time.  I will have him get his labs repeated in approximately 2 months.  I also encouraged him to schedule an appointment with Dr. Ming Trinh at [facility].  They may consider to obtain the bone marrow biopsy slides from here and compare it to his last bone marrow biopsy done at [facility].      PLAN:  Return to clinic 2 months with labs before CBC, differential, CMP, serum protein electrophoresis, immunofixation and light chains.         MARY CASTRO MD             D: 2019   T: 2019   MT: STEPHANIE      Name:     JULIO PRESCOTT   MRN:      [id number]        Account:      [id number]   :      1955           Visit Date:   2019      Document: [id number]       cc: Vince Trinh MD       Again, thank you for allowing me to participate in the care of your patient.        Sincerely,        Mary Castro MD

## 2019-02-28 ENCOUNTER — OFFICE VISIT (OUTPATIENT)
Dept: OTHER | Facility: CLINIC | Age: 64
End: 2019-02-28
Attending: INTERNAL MEDICINE
Payer: COMMERCIAL

## 2019-02-28 ENCOUNTER — TRANSFERRED RECORDS (OUTPATIENT)
Dept: HEALTH INFORMATION MANAGEMENT | Facility: CLINIC | Age: 64
End: 2019-02-28

## 2019-02-28 VITALS
OXYGEN SATURATION: 98 % | WEIGHT: 240 LBS | RESPIRATION RATE: 18 BRPM | BODY MASS INDEX: 37.67 KG/M2 | HEIGHT: 67 IN | SYSTOLIC BLOOD PRESSURE: 117 MMHG | HEART RATE: 90 BPM | DIASTOLIC BLOOD PRESSURE: 81 MMHG

## 2019-02-28 DIAGNOSIS — G47.33 OSA (OBSTRUCTIVE SLEEP APNEA): ICD-10-CM

## 2019-02-28 DIAGNOSIS — L97.911 ULCER OF RIGHT LOWER EXTREMITY, LIMITED TO BREAKDOWN OF SKIN (H): ICD-10-CM

## 2019-02-28 DIAGNOSIS — I83.893 VARICOSE VEINS OF BOTH LOWER EXTREMITIES WITH COMPLICATIONS: ICD-10-CM

## 2019-02-28 DIAGNOSIS — I89.0 LYMPHEDEMA OF BOTH LOWER EXTREMITIES: Primary | ICD-10-CM

## 2019-02-28 DIAGNOSIS — E11.9 TYPE 2 DIABETES MELLITUS WITHOUT COMPLICATION, WITHOUT LONG-TERM CURRENT USE OF INSULIN (H): ICD-10-CM

## 2019-02-28 DIAGNOSIS — I10 BENIGN ESSENTIAL HYPERTENSION: ICD-10-CM

## 2019-02-28 DIAGNOSIS — E78.2 MIXED HYPERLIPIDEMIA: ICD-10-CM

## 2019-02-28 PROCEDURE — 99215 OFFICE O/P EST HI 40 MIN: CPT | Mod: ZP | Performed by: INTERNAL MEDICINE

## 2019-02-28 PROCEDURE — G0463 HOSPITAL OUTPT CLINIC VISIT: HCPCS

## 2019-02-28 RX ORDER — MODAFINIL 200 MG/1
200 TABLET ORAL EVERY MORNING
COMMUNITY
End: 2020-05-04

## 2019-02-28 RX ORDER — DOXEPIN HYDROCHLORIDE 25 MG/1
50 CAPSULE ORAL AT BEDTIME
COMMUNITY
End: 2020-04-15

## 2019-02-28 ASSESSMENT — MIFFLIN-ST. JEOR: SCORE: 1847.26

## 2019-02-28 NOTE — PATIENT INSTRUCTIONS
Continue compression stockings, elevate legs    Lose weight and use CPAP    May decrease hydrochlorothiazide dose to 1/2 tab daily and monitor BP at home    Follow up with hem-onc     See me in 6 months.

## 2019-02-28 NOTE — PROGRESS NOTES
"Parmjit Hernández is a 63 year old male who presents for:  Chief Complaint   Patient presents with     RECHECK     follow up to venous comp ultrasound and labs         Vitals:    Vitals:    02/28/19 0914 02/28/19 0916   BP: 122/70 117/81   BP Location: Right arm Left arm   Patient Position: Chair Chair   Cuff Size: Adult Large Adult Large   Pulse: 90    Resp: 18    SpO2: 98%    Weight: 240 lb (108.9 kg)    Height: 5' 7\" (1.702 m)        BMI:  Estimated body mass index is 37.59 kg/m  as calculated from the following:    Height as of this encounter: 5' 7\" (1.702 m).    Weight as of this encounter: 240 lb (108.9 kg).    Pain Score:  Data Unavailable        Jose Cruz Harrison MA    "

## 2019-02-28 NOTE — PROGRESS NOTES
[facility] FOLLOW UP  VASCULAR MEDICINE VISIT      PRIMARY HEALTH CARE PROVIDER:  Vince Henry MD      REFERRING HEALTH CARE PROVIDER;  Matthew Hernandez MD      REASON FOR VISIT:     Follow up visit underwent venous comp studies at  last week  Recent RF, CCP, HARRY, centromere , scleroderma panel negative  Rt leg ulcer healing and better  Seen by oncologist progressing amylodoisis  Leg ulcer healed and better now  Leg swelling better with hydrochlorothiazide  Excellent BP with adding diuretic    HPI:      He was initially seen 2 weeks ago forEvaluation and management of vascular causes of Rt leg ulcer developed 2 weeks prior to last  Visit on lateral aspect of mid portion of leg. No pain, no fever, no injury, no claudication. Known DM well controlled ( A1c 5.3 in dec 2018). 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 Perez. . Non smoker, no Hx of DVT 1/2019 BLE venous duplex negative for DVT. Father HX of scleroderma . He underwent rheum panel last week all of them are normal. He denies any arthralgias, skin changes, swallowing problems. Previous EGD ,  Colonoscopy ( B9 polyps removed) capsule endoscopy negative.   Polyneuropathy and chronic back pain with fenatanyl pump in place and got epidural injection  2 weeks ago    Reviewed recent Venous comp results  Reviewed more than 200 pages of paper records from [place] and sent to media      PAST MEDICAL HISTORY  Past Medical History:   Diagnosis Date     Allergic state      Amyloidosis (H)      Diabetes mellitus (H)     type 2     Headache(784.0)      Hypertension 2007     Myalgia and myositis, unspecified      Obsessive-compulsive disorders      Other acquired absence of organ 94     Other specified viral warts      Pain in the abdomen      Pure hypercholesterolemia      Sleep apnea        CURRENT MEDICATIONS    Current Outpatient Medications  on File Prior to Visit:  aspirin (ASPIRIN LOW DOSE) 81 MG tablet Take 1 tablet (81 mg) by mouth daily   atorvastatin (LIPITOR) 10 MG tablet Take 1 tablet (10 mg) by mouth daily   blood glucose (ACCU-CHEK COMPACT DRUM) test strip Use to test blood sugars 3 to 4 times daily.   blood glucose (ACCU-CHEK MULTICLIX) lancing device Lancing device to be used with lancets.   blood glucose monitoring (ACCU-CHEK COMPACT CARE KIT) meter device kit Use to test blood sugars 3 to 4  times daily.   divalproex (DEPAKOTE) 500 MG EC tablet Take 2 tablets (1,000 mg) by mouth daily   doxepin (SINEQUAN) 25 MG capsule Take 25 mg by mouth At Bedtime   hydrochlorothiazide (HYDRODIURIL) 25 MG tablet Take 1 tablet (25 mg) by mouth daily   ibuprofen (ADVIL/MOTRIN) 200 MG tablet Take 200 mg by mouth every 6 hours as needed for mild pain   lisinopril (PRINIVIL/ZESTRIL) 20 MG tablet Take 1 tablet (20 mg) by mouth daily   lurasidone (LATUDA) 20 MG TABS tablet Take by mouth At Bedtime   modafinil (PROVIGIL) 200 MG tablet Take 200 mg by mouth daily   pregabalin (LYRICA) 50 MG capsule Take 100 mg by mouth 3 times daily Rx by [facility]    SUMAtriptan (IMITREX) 5 MG/ACT nasal spray USE ONE TO TWO SPRAYS IN NOSTRIL AS NEEDED FOR MIGRANE. MAY REPEAT IN 2 HOURS, MAX 8 SPRAYS IN 24 HOURS.   glipiZIDE (GLUCOTROL XL) 2.5 MG 24 hr tablet Take 1 tablet (2.5 mg) by mouth daily (Patient not taking: Reported on 2/28/2019)   glipiZIDE (GLUCOTROL XL) 5 MG 24 hr tablet Take 1 tablet (5 mg) by mouth daily (Patient not taking: Reported on 2/28/2019)   LYRICA 100 MG capsule Take 1 capsule (100 mg) by mouth 3 times daily (Patient not taking: Reported on 2/28/2019)   melatonin 5 MG tablet Take 5 mg by mouth nightly as needed for sleep     No current facility-administered medications on file prior to visit.     PAST SURGICAL HISTORY:  Past Surgical History:   Procedure Laterality Date     BONE MARROW BIOPSY, BONE SPECIMEN, NEEDLE/TROCAR N/A 6/8/2016    Procedure: BIOPSY  BONE MARROW;  Surgeon: Nathan Agrawal MD;  Location:  GI     BONE MARROW BIOPSY, BONE SPECIMEN, NEEDLE/TROCAR N/A 2/20/2019    Procedure: BIOPSY BONE MARROW;  Surgeon: Michael Raygoza MD;  Location:  GI     C NONSPECIFIC PROCEDURE  94    Cholecystectomy     C NONSPECIFIC PROCEDURE  2000    repair deviated septum     CHOLECYSTECTOMY  1995    lap qian     COLONOSCOPY  2012    hx polyps     ESOPHAGOSCOPY, GASTROSCOPY, DUODENOSCOPY (EGD), COMBINED N/A 5/29/2015    Procedure: COMBINED ESOPHAGOSCOPY, GASTROSCOPY, DUODENOSCOPY (EGD), BIOPSY SINGLE OR MULTIPLE;  Surgeon: John Jacob MD;  Location:  GI     HERNIA REPAIR, UMBILICAL  2006       ALLERGIES     Allergies   Allergen Reactions     Cephalexin Diarrhea     Liraglutide Other (See Comments)     Sulfa Drugs Swelling     Pt has taken  Taken sulfa drugs orally without trouble. He had problems with Sulfa eye drops. Eye swelled up     Victoza      Increased migraine frequency and severity       FAMILY HISTORY  Family History   Problem Relation Age of Onset     Cerebrovascular Disease Mother      C.A.D. Mother      Hypertension Mother      Alcohol/Drug Mother      Arthritis Mother      Cerebrovascular Disease Maternal Grandmother      C.A.D. Maternal Grandmother      Alcohol/Drug Maternal Grandmother      C.A.D. Father      Heart Disease Father      Hypertension Father      Alcohol/Drug Father      Allergies Father      Circulatory Father      Depression Father      Respiratory Father      Asthma Father      Alcohol/Drug Paternal Grandfather      Cerebrovascular Disease Paternal Grandfather      Depression Son      Psychotic Disorder Son         anxiety disorder     Depression Daughter      Cerebrovascular Disease Maternal Grandfather      Cerebrovascular Disease Paternal Grandmother      Diabetes Brother      Allergies Sister      Depression Sister      Gynecology Sister      Coronary Artery Disease No family hx of      Hyperlipidemia  No family hx of      Breast Cancer No family hx of      Colon Cancer No family hx of      Prostate Cancer No family hx of      Other Cancer No family hx of      Anxiety Disorder No family hx of      Mental Illness No family hx of      Substance Abuse No family hx of      Anesthesia Reaction No family hx of      Osteoporosis No family hx of      Genetic Disorder No family hx of      Thyroid Disease No family hx of      Obesity No family hx of      Unknown/Adopted No family hx of        VASCULAR FAMILY HISTORY  1st order relative with atherosclerotic PAD: ? Father with bad circulation   1st order relative with AAA: No  Family history of Familial Hyperlipidemia No  Family History of Hypercoagulable state:No    VASCULAR RISK FACTORS  1. Diabetes:Yes controlled  2. Smoking: has never smoked.  3. HTN: uncontrolled  4.Hyperlipidemia: Yes - uncontrolled      SOCIAL HISTORY  Social History     Socioeconomic History     Marital status:      Spouse name: Not on file     Number of children: 3     Years of education: Not on file     Highest education level: Not on file   Occupational History     Employer: [employer]   Social Needs     Financial resource strain: Not on file     Food insecurity:     Worry: Not on file     Inability: Not on file     Transportation needs:     Medical: Not on file     Non-medical: Not on file   Tobacco Use     Smoking status: Never Smoker     Smokeless tobacco: Never Used   Substance and Sexual Activity     Alcohol use: No     Alcohol/week: 0.0 oz     Drug use: No     Sexual activity: No   Lifestyle     Physical activity:     Days per week: Not on file     Minutes per session: Not on file     Stress: Not on file   Relationships     Social connections:     Talks on phone: Not on file     Gets together: Not on file     Attends Tenriism service: Not on file     Active member of club or organization: Not on file     Attends meetings of clubs or organizations: Not on file     Relationship  status: Not on file     Intimate partner violence:     Fear of current or ex partner: Not on file     Emotionally abused: Not on file     Physically abused: Not on file     Forced sexual activity: Not on file   Other Topics Concern     Parent/sibling w/ CABG, MI or angioplasty before 65F 55M? No   Social History Narrative    Social Documentation:05/27/2010        Balanced Diet: NO    Calcium intake: 3-4 per day    Caffeine: 2 per day    Exercise:  type of activity NO    Sunscreen: Yes    Seatbelts:  Yes    Self Breast Exam:  No - na    Self Testicular Exam: no    Physical/Emotional/Sexual Abuse: No     Do you feel safe in your environment? Yes        Cholesterol screen up to date: Yes    Eye Exam up to date: Yes    Dental Exam up to date: Yes    Pap smear up to date: Does Not Apply    Mammogram up to date: Does Not Apply    Dexa Scan up to date:NO    Colonoscopy up to date:2007    Immunizations up to date: YES    Glucose screen if over 40: Yes        Sofi Rosas CMA                   ROS:   General: No change in weight, sleep or appetite.  Normal energy.  No fever or chills  Eyes: Negative for vision changes or eye problems  ENT: No problems with ears, nose or throat.  No difficulty swallowing.  Resp: No coughing, wheezing or shortness of breath  CV: No chest pains or palpitations  GI: No nausea, vomiting,  heartburn, abdominal pain, diarrhea, constipation or change in bowel habits  : No urinary frequency or dysuria, bladder or kidney problems  Musculoskeletal: No significant muscle or joint pains  Neurologic: No headaches, numbness, tingling, weakness, problems with balance or coordination  Psychiatric: No problems with anxiety, depression or mental health  Heme/immune/allergy: No history of bleeding or clotting problems or anemia.  No allergies or immune system problems  Endocrine: No history of thyroid disease, diabetes or other endocrine disorders  Skin: healed circular crusted ulcer 3x3 cm size right  "lateral aspect of distal portion of the leg, no signs of infection  Vascular: No claudication symptoms, no previous leg surgeries, getting edema therapy at lymphedema clinic    EXAM:  /81 (BP Location: Left arm, Patient Position: Chair, Cuff Size: Adult Large)   Pulse 90   Resp 18   Ht 5' 7\" (1.702 m)   Wt 240 lb (108.9 kg)   SpO2 98%   BMI 37.59 kg/m    In general, the patient is a pleasant male in no apparent distress.    HEENT: NC/AT.  PERRLA.  EOMI.  Sclerae white, not injected.  Nares clear.  Pharynx without erythema or exudate.  Dentition intact.    Neck: No adenopathy.  No thyromegaly. Carotids +2/2 bilaterally without bruits.  No jugular venous distension.   Heart: RRR. Normal S1, S2 splits physiologically. No murmur, rub, click, or gallop. The PMI is in the 5th ICS in the midclavicular line. There is no heave.    Lungs: CTA.  No ronchi, wheezes, rales.  No dullness to percussion.   Abdomen: Soft, nontender, nondistended. No organomegaly. No AAA.  No bruits.   Extremities: vascular:  Bilateral lower extremity trace edema, bilateral lower extremity varicose veins CEAP4 CVI, no venous ulceration  Healed circular 3 x 3 cm size crusted skin lesion lateral aspect of midportion of right leg last visit  and better now.  Bilateral palpable and dopplerable multiphasic DP PT pulses.  Palpable popliteal and femoral pulses bilaterally        Labs:  LIPID RESULTS:  Lab Results   Component Value Date    CHOL 109 05/16/2018    HDL 25 (L) 05/16/2018    LDL 27 05/16/2018    TRIG 286 (H) 05/16/2018    CHOLHDLRATIO 5.9 (H) 09/01/2015       LIVER ENZYME RESULTS:  Lab Results   Component Value Date    AST 18 02/13/2019    ALT 25 02/13/2019       CBC RESULTS:  Lab Results   Component Value Date    WBC 7.4 02/20/2019    RBC 3.48 (L) 02/20/2019    HGB 11.1 (L) 02/20/2019    HCT 33.1 (L) 02/20/2019    MCV 95 02/20/2019    MCH 31.9 02/20/2019    MCHC 33.5 02/20/2019    RDW 14.7 02/20/2019     (L) 02/20/2019 "       BMP RESULTS:  Lab Results   Component Value Date     02/13/2019    POTASSIUM 4.8 02/13/2019    CHLORIDE 115 (H) 02/13/2019    CO2 22 02/13/2019    ANIONGAP 6 02/13/2019     (H) 02/13/2019    BUN 21 02/13/2019    CR 0.83 02/13/2019    GFRESTIMATED >90 02/13/2019    GFRESTBLACK >90 02/13/2019    LUIS 8.3 (L) 02/13/2019        A1C RESULTS:  Lab Results   Component Value Date    A1C 5.3 12/10/2018       THYROID RESULTS:  Lab Results   Component Value Date    TSH 1.75 08/28/2018         RECENT VENOUS COMP AT VS REVIEWED 2/18/19    No DVT BLE  Rt GSV I at SFJ   RT VV incompetence  Duplicated Rt FV distally  BLE CFV I  Rt prox  FV I  LT GSV I proximal and distal calf      Assessment and Plan:     1. Ulcer of right lower extremity, limited to breakdown of skin (H) healed and much better compared to last visit  2. 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  No injury.  He is obese with history of well-controlled diabetes and also history of amyloidosis status post stem cell transplant at [facility] in 2016 now with evolving pancytopenia and elevated MCV ?  May be developing secondary malignancy.  He underwent bone marrow biopsy a few weeks ago and per patient amyloidosis is progressing and he may need stem cell transplant again.  He has no claudication symptoms.  Excellent palpable pulses and bedside Dopplers revealed multiphasic pedal pulses.  Getting lymphedema therapy  Clinical exam consistent with CEAP 4 CVI bilaterally.  He also has a stiffness of fingers and family history of father with scleroderma, recent rheumatological panel negative.  No cutaneous scleroderma signs on exam, no history of inflammatory bowel disease.    Reviewed  labs and venous competency studies as delineated above    Plan:  Continue compression stockings  Elevate the legs  Lose weight  Continue hydrochlorothiazide and may decrease dose to half a tablet that is 12.5 mg daily and monitor blood pressure  Reevaluate him in 6 months  Since he is progressing no need for any venous intervention          3. Benign essential hypertension  He has a bilateral lower extremity edema, with chronic venous insufficiency and high blood pressure in the clinic last visit 168/100 on right side and 166/107 on the left side. But normal today 117/81, 120/70  Leg swelling improved.  Has been taking lisinopril continue the same and added hydrochlorothiazide 25 mg daily in the morning with some increase in urinary frequency    In the future this can be combined or may be even decrease dose of hydrochlorothiazide 12.5 mg daily  Monitor blood pressure outside  Avoid NSAIDs  Lose weight  DASH diet discussed with the patient        4. Mixed hyperlipidemia  Has a previous history of severe mixed hyperlipidemia both elevated total cholesterol LDL and triglycerides recently improved with adjustment of medications and taking atorvastatin continue the same.    5. NORMA (obstructive sleep apnea)  He has been using CPAP machine continue the same.    6. Type 2 diabetes mellitus without complication, without long-term current use of insulin (H)  Well-controlled with current lifestyle modification, recent hemoglobin A1c excellent range continue the same plan    7. Amyloidosis, unspecified type (H) s/p Stem cell tranplant at [place] 2016   Has been following up with the [facility] and also managed by Dr. Castro recently underwent bone marrow biopsy, continue the same plan    This note was dictated by utilizing dragon software  Thank you for the consultation    Total patient care time spent today more than 40 minutes face-to-face, reviewed outside records and updated chart , reviewed extensive records from  [place].    Copy of this dictation to primary care physician and referring physician      Rodrick Ferrer MD,[facility]  Vascular Medicine

## 2019-02-28 NOTE — PROGRESS NOTES
[facility] PHYSICIANS  HEMATOLOGY ONCOLOGY    Visit Date:   02/27/2019      DIAGNOSES:     1. 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  In 06/2016, a 1.4 cm lytic lesion in the radial diaphysis and in calvarium.  Bone marrow biopsy 06/2016 normal cellular marrow, 50% cellularity, 5% plasma cells, 4% kappa monotypic plasma cells.  Staining positive for Congo red, normal cytogenetics, translocation 11;14, monosomal 13.  PET/CT scan 06/2016:  Lytic lesions were not identified.  Subcarinal lymph node was positive for amyloidosis treated with CyBorD starting from 08/2006, very good partial response with normalization of the light chain and mildly persistent elevation of the light chain ratio.  In 11/2016, proceeded with auto stem cell transplant and achieved complete hematological response.  All of this was accomplished at [facility] under the care of Dr. Ming Trinh.   2.  Chronic abdominal pain, unclear etiology.  Follows up with [facility].  In 12/2018, he had a pain pump placed.      TREATMENT:  Observation.      SUBJECTIVE:  The patient was seen as a followup today.  We reviewed the results of the bone marrow biopsy and discussed further plan of care.  He continues to be fatigued.  His tolerance to daily activities is improving.     REVIEW OF SYSTEMS:  A complete review of systems was performed and found to be negative other than pertinent positives mentioned in history of present illness.      Past medical, social histories reviewed.     Meds- Reviewed.     PHYSICAL EXAMINATION:   VITAL SIGNS:  Blood pressure is 112/81, pulse 96, temperature 98.3.     CONSTITUTIONAL:  Sitting comfortably.   NEUROLOGIC:  Alert, awake.   PSYCHIATRIC:  Mood and affect appear normal.      LABORATORY DATA AND IMAGING REVIEWED DURING THIS VISIT:  Recent Labs    Lab Test 02/13/19  1410 01/17/19  1202    140   POTASSIUM 4.8 4.1   CHLORIDE 115* 107   CO2 22 22   ANIONGAP 6 11   BUN 21 26   CR 0.83 0.90   * 142*   LUIS 8.3* 8.9     Recent Labs   Lab Test 02/20/19  0853 02/13/19  1410 01/17/19  1202   WBC 7.4 4.0 6.1   HGB 11.1* 10.2* 9.3*   * 112* 170   MCV 95 96 103*   NEUTROPHIL 78.9 69.1 71.9     Recent Labs   Lab Test 02/13/19  1410 01/17/19  1202 09/16/18 08/28/18 2015 08/28/18  1259   BILITOTAL 0.3 0.3  --  1.7* 1.6*   ALKPHOS 107 86  --  78 75   ALT 25 15 32 30 28   AST 18 9 26 16 15   ALBUMIN 3.2* 3.3*  --  3.8 4.0     --   --   --  465*     ECOG PS: 1     ASSESSMENT:  A 63-year-old gentleman with AL kappa amyloidosis subtype in the lymph node and bone marrow.      On 02/13/2019, he was seen by me for initial evaluation for development of microcytic anemia.  We proceeded with additional workup, results of which were reviewed with the patient today.        LABORATORIES:  The patient had a normal B12, normal LDH, normal ferritin, normal folate.  His hemoglobin has in fact improved to 11.1 and his MCV has normalized.  He has mild thrombocytopenia with platelet count of 120,000.  Manpreet test was negative.  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  He proceeded with a bone marrow biopsy on 02/20/2019.  It shows normal cellular marrow with 50% cellularity, trilineage hematopoiesis with relative erythroid hyperplasia, 1% polytypic plasma cells, persistent amyloidosis, several foci of amyloid were present around small vessels.      I discussed with the patient that given his hemoglobin is improving, the MCV has normalized, and also no significant abnormalities on bone marrow biopsy except for persistent amyloidosis, I would recommend observation at this time.  I will have him get his labs repeated in approximately 2 months.  I also encouraged him to  schedule an appointment with Dr. Ming Trinh at [facility].  They may consider to obtain the bone marrow biopsy slides from here and compare it to his last bone marrow biopsy done at [facility].      PLAN:  Return to clinic 2 months with labs before CBC, differential, CMP, serum protein electrophoresis, immunofixation and light chains.         RAVI KAM MD             D: 2019   T: 2019   MT: STEPHANIE      Name:     JULIO PRESCOTT   MRN:      [id number]        Account:      [id number]   :      1955           Visit Date:   2019      Document: [id number]       cc: Vince Trinh MD

## 2019-03-05 ENCOUNTER — OFFICE VISIT (OUTPATIENT)
Dept: SLEEP MEDICINE | Facility: CLINIC | Age: 64
End: 2019-03-05
Payer: COMMERCIAL

## 2019-03-05 VITALS
WEIGHT: 242 LBS | OXYGEN SATURATION: 94 % | RESPIRATION RATE: 16 BRPM | HEIGHT: 67 IN | BODY MASS INDEX: 37.98 KG/M2 | HEART RATE: 83 BPM | SYSTOLIC BLOOD PRESSURE: 126 MMHG | DIASTOLIC BLOOD PRESSURE: 85 MMHG

## 2019-03-05 DIAGNOSIS — G47.33 OSA (OBSTRUCTIVE SLEEP APNEA): ICD-10-CM

## 2019-03-05 DIAGNOSIS — G47.33 OBSTRUCTIVE SLEEP APNEA SYNDROME: ICD-10-CM

## 2019-03-05 PROCEDURE — 99214 OFFICE O/P EST MOD 30 MIN: CPT | Performed by: INTERNAL MEDICINE

## 2019-03-05 RX ORDER — GLIPIZIDE 5 MG/1
TABLET, FILM COATED, EXTENDED RELEASE ORAL
Refills: 3 | COMMUNITY
Start: 2018-12-31 | End: 2019-05-10

## 2019-03-05 RX ORDER — PREGABALIN 100 MG
CAPSULE ORAL
Refills: 0 | COMMUNITY
Start: 2019-02-22 | End: 2019-05-10

## 2019-03-05 ASSESSMENT — MIFFLIN-ST. JEOR: SCORE: 1856.33

## 2019-03-05 NOTE — PROGRESS NOTES
Obstructive Sleep Apnea - PAP Follow-Up Visit:    Chief Complaint   Patient presents with     CPAP Follow Up     Not tolerating mask and pressure.       Parmjit Hernández comes in today for follow-up of their severe sleep apnea, managed with CPAP. He was last seen in our clinic in 2017.     PSG on 5/28/2008 showed an AHI of 98.3 per hour and O2 saturation sawyer of 76%.     Medical history is significant for HTN, DM 2, bipolar I disorder, anxiety, OCD, amyloidosis and obesity.     Patient had not been using CPAP but started 3 weeks ago. He complains of daytime tiredness and sleepiness. He was started on Modafinil a month ago by his primary care which helped initially but effect has somewhat diminished now.     Overall, he rates the experience with PAP as 7 (0 poor, 10 great). The mask is comfortable. The mask is not leaking.  He is not snoring with the mask on. He is not having gasp arousals.  He is not having significant oral/nasal dryness. The pressure settings are comfortable.     He uses full-face mask.     Patient has a delayed circadian rhythm.     Bedtime is typically 11 pm - 1 am. Usually it takes about 30 minutes to fall asleep with the mask on. Wake time is typically 5-6 am.  Patient is using PAP therapy 5 hours per night. The patient is usually getting 5 hours of sleep per night.    He does not feel rested in the morning.    [place] Sleepiness Scale: 23/24    Respironics    Auto-PAP 17-20 cmH2O download:  25/30 total days of use. 5 nonuse days.  Average use 4 hours 55 minutes per day.  46.7% days with >4 hours use.  Large leak 11 mins per day average. CPAP 90% pressure 17.9cm. AHI 5.0      He drinks 1 energy drink daily.       Reviewed by team: Allergies       Reviewed by provider:        Problem List:  Patient Active Problem List    Diagnosis Date Noted     Polyneuropathy associated with underlying disease (H) 02/04/2019     Priority: Medium     Acquired lymphedema 02/04/2019     Priority: Medium      Stasis dermatitis of both legs 12/31/2018     Priority: Medium     Morbid obesity (H) 08/28/2018     Priority: Medium     Peripheral edema 05/20/2018     Priority: Medium     Noncardiac  Continue with  furosemide (LASIX) 20 MG tablet,   potassium       chloride SA (K-DUR/KLOR-CON M) 10 MEQ CR  Tab once daily   Basic metabolic panel       Type 2 diabetes mellitus with other specified complication (H) 07/10/2017     Priority: Medium     Obstructive sleep apnea syndrome 06/29/2017     Priority: Medium     sleep study       Restless legs syndrome (RLS) 06/29/2017     Priority: Medium     Narcotic dependence (H) 09/06/2016     Priority: Medium     None in about 6 months- 8/1/17       Insomnia due to medical condition 08/23/2016     Priority: Medium     Other amyloidosis (H) 06/01/2016     Priority: Medium      #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 7/3/2018      Dr [id number]       Anxiety 05/11/2016     Priority: Medium     Chronic abdominal pain 03/15/2016     Priority: Medium     Bipolar I disorder (H) 09/01/2015     Priority: Medium     Under care of psychiatrist [facility]- Dr Rahman  doxepin 25 mg, 2 cap bedtime  DULoxetine 20 mg once daily   OLANZapine 7.5 mg  1 tab bedtime        Marianne (H) 08/20/2015     Priority: Medium     MENTAL HEALTH 08/17/2015     Priority: Medium     History of diverticulitis 01/27/2015     Priority: Medium     1/15-rxed with antibiotics       Advanced directives, counseling/discussion 05/29/2012     Priority: Medium     Discussed advance care planning with patient; information given to patient to review. 5/29/2012     Honoring choices letter mailed. 7-  Lynsey Bustamante RT (R)         Migraine 05/29/2012     Priority: Medium     Hypertension goal BP (blood pressure) < 140/90 10/11/2011     Priority: Medium     HYPERLIPIDEMIA LDL GOAL <100 10/31/2010     Priority: Medium     Type 2 diabetes mellitus without  "complication, without long-term current use of insulin (H) 05/22/2009     Priority: Medium     Obstructive sleep apnea 07/16/2008     Priority: Medium          /85   Pulse 83   Resp 16   Ht 1.702 m (5' 7\")   Wt 109.8 kg (242 lb)   SpO2 94%   BMI 37.90 kg/m      Impression/Plan:     Severe sleep apnea.   Delayed sleep phae circadian rhythm  Insomnia   Chronically insufficient sleep     - He restarted CPAP after a break and has been using it fairly consistently over last few weeks. However average use is less than 5 hours. patient has variable sleep wake schedule, delayed sleep phase and chronically insufficient sleep. He was counseled in detail regarding  severe sleep apnea and consequences of untreated disease. We also discussed optimization of sleep wake schedules and importance of obtaining atleast 6-7 hours of sleep on a regular basis.     - CPAP download shows acceptable. He will continue therapy and concentrate on increasing sleep intake.     Plan:     1. Continue auto PAP therapy     Parmjit Hernández will follow up in about 6- 12 month(s).     Twenty-five minutes spent with patient, all of which were spent face-to-face counseling, consulting, coordinating plan of care.      Ang Rodrigues MD, MD    CC:  Vince Henry,   "

## 2019-03-05 NOTE — PATIENT INSTRUCTIONS
Your Body mass index is 37.9 kg/m .  Weight management is a personal decision.  If you are interested in exploring weight loss strategies, the following discussion covers the approaches that may be successful. Body mass index (BMI) is one way to tell whether you are at a healthy weight, overweight, or obese. It measures your weight in relation to your height.  A BMI of 18.5 to 24.9 is in the healthy range. A person with a BMI of 25 to 29.9 is considered overweight, and someone with a BMI of 30 or greater is considered obese. More than two-thirds of American adults are considered overweight or obese.  Being overweight or obese increases the risk for further weight gain. Excess weight may lead to heart disease and diabetes.  Creating and following plans for healthy eating and physical activity may help you improve your health.  Weight control is part of healthy lifestyle and includes exercise, emotional health, and healthy eating habits. Careful eating habits lifelong are the mainstay of weight control. Though there are significant health benefits from weight loss, long-term weight loss with diet alone may be very difficult to achieve- studies show long-term success with dietary management in less than 10% of people. Attaining a healthy weight may be especially difficult to achieve in those with severe obesity. In some cases, medications, devices and surgical management might be considered.  What can you do?  If you are overweight or obese and are interested in methods for weight loss, you should discuss this with your provider.     Consider reducing daily calorie intake by 500 calories.     Keep a food journal.     Avoiding skipping meals, consider cutting portions instead.    Diet combined with exercise helps maintain muscle while optimizing fat loss. Strength training is particularly important for building and maintaining muscle mass. Exercise helps reduce stress, increase energy, and improves fitness.  Increasing exercise without diet control, however, may not burn enough calories to loose weight.       Start walking three days a week 10-20 minutes at a time    Work towards walking thirty minutes five days a week     Eventually, increase the speed of your walking for 1-2 minutes at time    In addition, we recommend that you review healthy lifestyles and methods for weight loss available through the National Institutes of Health patient information sites:  http://win.niddk.nih.gov/publications/index.htm    And look into health and wellness programs that may be available through your health insurance provider, employer, local community center, or ronald club.    Weight management plan: Patient was referred to their PCP to discuss a diet and exercise plan.

## 2019-03-05 NOTE — NURSING NOTE
"Chief Complaint   Patient presents with     CPAP Follow Up     Not tolerating mask and pressure.       Initial /85   Pulse 83   Resp 16   Ht 1.702 m (5' 7\")   Wt 109.8 kg (242 lb)   SpO2 94%   BMI 37.90 kg/m   Estimated body mass index is 37.9 kg/m  as calculated from the following:    Height as of this encounter: 1.702 m (5' 7\").    Weight as of this encounter: 109.8 kg (242 lb).    Medication Reconciliation: complete     ESS 23  Neck 47cm  Gloria Jon        "

## 2019-03-07 ENCOUNTER — TELEPHONE (OUTPATIENT)
Dept: FAMILY MEDICINE | Facility: CLINIC | Age: 64
End: 2019-03-07

## 2019-03-07 NOTE — TELEPHONE ENCOUNTER
Erickson has been approved to the [employer] assistance program for Lyrica through December 2019.  He will receive this medication at no cost through the enrollment period.    A 90 day supply of Lyrica will be delivered to Erickson's home within 7-10 business days. Erickson has been informed of this.    Erickson will contact my office for refills as we must work directly with the .  I will note EPIC as each refill is requested.    Thanks so much for your help!    Barb Huerta  Prescription

## 2019-03-14 LAB — COPATH REPORT: NORMAL

## 2019-03-18 LAB — COPATH REPORT: NORMAL

## 2019-04-01 ENCOUNTER — TELEPHONE (OUTPATIENT)
Dept: FAMILY MEDICINE | Facility: CLINIC | Age: 64
End: 2019-04-01

## 2019-04-01 DIAGNOSIS — G43.809 OTHER MIGRAINE WITHOUT STATUS MIGRAINOSUS, NOT INTRACTABLE: Primary | ICD-10-CM

## 2019-04-01 NOTE — TELEPHONE ENCOUNTER
Erickson has been approved to the [employer] assistance program for Maxalt through.December 2019   HE will receive this medication at no cost through the enrollment period.    A 90 day supply of MAXALT will be delivered to Erickson's home within 7-10 business days. Erickson has been informed of this approval.    Erickson will contact my office for refills as we must work directly with the .  I will note EPIC as each refill is requested.    Thanks so much for your help!    Barb Huerta  Prescription

## 2019-04-22 DIAGNOSIS — G43.809 OTHER MIGRAINE WITHOUT STATUS MIGRAINOSUS, NOT INTRACTABLE: Primary | ICD-10-CM

## 2019-04-22 RX ORDER — RIZATRIPTAN BENZOATE 10 MG/1
10 TABLET ORAL
Qty: 30 TABLET | Refills: 0 | Status: SHIPPED | OUTPATIENT
Start: 2019-04-22 | End: 2019-05-10

## 2019-04-22 NOTE — TELEPHONE ENCOUNTER
"[facility] [employer]) called and is requesting a clarification of instructions that state \"Take half a tablet.\" Location does not split pills and it is not recommended to split Maxalt. 10 mg is the only dose available    Ph: [phone]  Any [facility] is able to process call  "

## 2019-04-22 NOTE — TELEPHONE ENCOUNTER
Please call [facility] - ([facility] [employer])  to dispense Maxalt 10mg, take 1 tablet at the onset of the headache. May repeat dose after 2 hours. Max is 30mg per day.       Elaine

## 2019-04-22 NOTE — TELEPHONE ENCOUNTER
FS,   Called below pharmacy about Maxalt  Below pharmacy stating last Rx for Maxalt from you was for 10mg 1/2 tab daily  They cannot split these pills  Don't see Rx with this date  Last Rx for 1/24/2019 but d/c'd   Maxalt no longer on active med list  Please advise if pt to be on this med and what dose/directions  May have to send another Rx to [facility] (Rx Assistance Program)  Thanks,  Stacie MAYA RN

## 2019-05-01 ENCOUNTER — TELEPHONE (OUTPATIENT)
Dept: ONCOLOGY | Facility: CLINIC | Age: 64
End: 2019-05-01

## 2019-05-01 NOTE — TELEPHONE ENCOUNTER
Attempted to contact patient for no showed Dr Castro visit- patient mailbox full, unable to leave message.

## 2019-05-09 ENCOUNTER — INFUSION THERAPY VISIT (OUTPATIENT)
Dept: INFUSION THERAPY | Facility: CLINIC | Age: 64
End: 2019-05-09
Attending: INTERNAL MEDICINE
Payer: COMMERCIAL

## 2019-05-09 ENCOUNTER — HOSPITAL ENCOUNTER (OUTPATIENT)
Facility: CLINIC | Age: 64
Setting detail: SPECIMEN
Discharge: HOME | End: 2019-05-09
Attending: INTERNAL MEDICINE | Admitting: INTERNAL MEDICINE
Payer: COMMERCIAL

## 2019-05-09 DIAGNOSIS — G63 POLYNEUROPATHY ASSOCIATED WITH UNDERLYING DISEASE (H): Primary | ICD-10-CM

## 2019-05-09 DIAGNOSIS — E85.89 OTHER AMYLOIDOSIS (H): ICD-10-CM

## 2019-05-09 LAB
ALBUMIN SERPL-MCNC: 3.8 G/DL (ref 3.4–5)
ALP SERPL-CCNC: 84 U/L (ref 40–150)
ALT SERPL W P-5'-P-CCNC: 32 U/L (ref 0–70)
ANION GAP SERPL CALCULATED.3IONS-SCNC: 7 MMOL/L (ref 3–14)
AST SERPL W P-5'-P-CCNC: 18 U/L (ref 0–45)
BASOPHILS # BLD AUTO: 0.1 10E9/L (ref 0–0.2)
BASOPHILS NFR BLD AUTO: 1 %
BILIRUB SERPL-MCNC: 0.5 MG/DL (ref 0.2–1.3)
BUN SERPL-MCNC: 50 MG/DL (ref 7–30)
CALCIUM SERPL-MCNC: 8.4 MG/DL (ref 8.5–10.1)
CHLORIDE SERPL-SCNC: 107 MMOL/L (ref 94–109)
CO2 SERPL-SCNC: 26 MMOL/L (ref 20–32)
CREAT SERPL-MCNC: 1.89 MG/DL (ref 0.66–1.25)
DIFFERENTIAL METHOD BLD: ABNORMAL
EOSINOPHIL # BLD AUTO: 0.3 10E9/L (ref 0–0.7)
EOSINOPHIL NFR BLD AUTO: 4.8 %
ERYTHROCYTE [DISTWIDTH] IN BLOOD BY AUTOMATED COUNT: 15.2 % (ref 10–15)
GFR SERPL CREATININE-BSD FRML MDRD: 37 ML/MIN/{1.73_M2}
GLUCOSE SERPL-MCNC: 82 MG/DL (ref 70–99)
HCT VFR BLD AUTO: 33.7 % (ref 40–53)
HGB BLD-MCNC: 11.8 G/DL (ref 13.3–17.7)
IMM GRANULOCYTES # BLD: 0 10E9/L (ref 0–0.4)
IMM GRANULOCYTES NFR BLD: 0.4 %
LYMPHOCYTES # BLD AUTO: 0.9 10E9/L (ref 0.8–5.3)
LYMPHOCYTES NFR BLD AUTO: 16.2 %
MCH RBC QN AUTO: 34.1 PG (ref 26.5–33)
MCHC RBC AUTO-ENTMCNC: 35 G/DL (ref 31.5–36.5)
MCV RBC AUTO: 97 FL (ref 78–100)
MONOCYTES # BLD AUTO: 0.6 10E9/L (ref 0–1.3)
MONOCYTES NFR BLD AUTO: 10.9 %
NEUTROPHILS # BLD AUTO: 3.5 10E9/L (ref 1.6–8.3)
NEUTROPHILS NFR BLD AUTO: 66.7 %
NRBC # BLD AUTO: 0 10*3/UL
NRBC BLD AUTO-RTO: 0 /100
PLATELET # BLD AUTO: 154 10E9/L (ref 150–450)
POTASSIUM SERPL-SCNC: 4.8 MMOL/L (ref 3.4–5.3)
PROT SERPL-MCNC: 6.8 G/DL (ref 6.8–8.8)
RBC # BLD AUTO: 3.46 10E12/L (ref 4.4–5.9)
SODIUM SERPL-SCNC: 140 MMOL/L (ref 133–144)
WBC # BLD AUTO: 5.2 10E9/L (ref 4–11)

## 2019-05-09 PROCEDURE — 36415 COLL VENOUS BLD VENIPUNCTURE: CPT

## 2019-05-09 PROCEDURE — 86334 IMMUNOFIX E-PHORESIS SERUM: CPT | Performed by: INTERNAL MEDICINE

## 2019-05-09 PROCEDURE — 00000402 ZZHCL STATISTIC TOTAL PROTEIN: Performed by: INTERNAL MEDICINE

## 2019-05-09 PROCEDURE — 82784 ASSAY IGA/IGD/IGG/IGM EACH: CPT | Performed by: INTERNAL MEDICINE

## 2019-05-09 PROCEDURE — 83883 ASSAY NEPHELOMETRY NOT SPEC: CPT | Performed by: INTERNAL MEDICINE

## 2019-05-09 PROCEDURE — 80053 COMPREHEN METABOLIC PANEL: CPT | Performed by: INTERNAL MEDICINE

## 2019-05-09 PROCEDURE — 85025 COMPLETE CBC W/AUTO DIFF WBC: CPT | Performed by: INTERNAL MEDICINE

## 2019-05-09 PROCEDURE — 84165 PROTEIN E-PHORESIS SERUM: CPT | Performed by: INTERNAL MEDICINE

## 2019-05-09 NOTE — PROGRESS NOTES
Medical Assistant Note:  Parmjit Hernández presents today for blood draw.    Patient seen by provider today: No.   present during visit today: Not Applicable.    Concerns: No Concerns.    Procedure:  Lab draw site: Left hand, Needle type: BF, Gauge: 23g with gauze and coban.    Post Assessment:  Labs drawn without difficulty: Yes.    Discharge Plan:  Departure Mode: Ambulatory.    Face to Face Time: 5.    Beckie Clayton

## 2019-05-10 ENCOUNTER — DOCUMENTATION ONLY (OUTPATIENT)
Dept: ONCOLOGY | Facility: CLINIC | Age: 64
End: 2019-05-10

## 2019-05-10 ENCOUNTER — PATIENT OUTREACH (OUTPATIENT)
Dept: ONCOLOGY | Facility: CLINIC | Age: 64
End: 2019-05-10

## 2019-05-10 ENCOUNTER — HOSPITAL ENCOUNTER (OUTPATIENT)
Facility: CLINIC | Age: 64
Setting detail: OBSERVATION
Discharge: HOME | End: 2019-05-11
Attending: EMERGENCY MEDICINE | Admitting: HOSPITALIST
Payer: COMMERCIAL

## 2019-05-10 DIAGNOSIS — N17.9 ACUTE KIDNEY INJURY (H): ICD-10-CM

## 2019-05-10 PROBLEM — N19 RENAL FAILURE: Status: ACTIVE | Noted: 2019-05-10

## 2019-05-10 LAB
ALBUMIN SERPL ELPH-MCNC: 4.1 G/DL (ref 3.7–5.1)
ALBUMIN UR-MCNC: NEGATIVE MG/DL
ALPHA1 GLOB SERPL ELPH-MCNC: 0.4 G/DL (ref 0.2–0.4)
ALPHA2 GLOB SERPL ELPH-MCNC: 0.6 G/DL (ref 0.5–0.9)
ANION GAP SERPL CALCULATED.3IONS-SCNC: 8 MMOL/L (ref 3–14)
APPEARANCE UR: CLEAR
B-GLOBULIN SERPL ELPH-MCNC: 0.7 G/DL (ref 0.6–1)
BASOPHILS # BLD AUTO: 0 10E9/L (ref 0–0.2)
BASOPHILS NFR BLD AUTO: 0.8 %
BILIRUB UR QL STRIP: NEGATIVE
BUN SERPL-MCNC: 52 MG/DL (ref 7–30)
CALCIUM SERPL-MCNC: 8.5 MG/DL (ref 8.5–10.1)
CHLORIDE SERPL-SCNC: 108 MMOL/L (ref 94–109)
CO2 SERPL-SCNC: 24 MMOL/L (ref 20–32)
COLOR UR AUTO: YELLOW
CREAT SERPL-MCNC: 2.18 MG/DL (ref 0.66–1.25)
CREAT UR-MCNC: 155 MG/DL
DIFFERENTIAL METHOD BLD: ABNORMAL
EOSINOPHIL # BLD AUTO: 0.2 10E9/L (ref 0–0.7)
EOSINOPHIL NFR BLD AUTO: 4.9 %
ERYTHROCYTE [DISTWIDTH] IN BLOOD BY AUTOMATED COUNT: 15.4 % (ref 10–15)
GAMMA GLOB SERPL ELPH-MCNC: 0.5 G/DL (ref 0.7–1.6)
GFR SERPL CREATININE-BSD FRML MDRD: 31 ML/MIN/{1.73_M2}
GLUCOSE BLDC GLUCOMTR-MCNC: 65 MG/DL (ref 70–99)
GLUCOSE BLDC GLUCOMTR-MCNC: 70 MG/DL (ref 70–99)
GLUCOSE BLDC GLUCOMTR-MCNC: 77 MG/DL (ref 70–99)
GLUCOSE SERPL-MCNC: 116 MG/DL (ref 70–99)
GLUCOSE UR STRIP-MCNC: NEGATIVE MG/DL
HBA1C MFR BLD: 4.7 % (ref 0–5.6)
HCT VFR BLD AUTO: 29.6 % (ref 40–53)
HGB BLD-MCNC: 10.5 G/DL (ref 13.3–17.7)
HGB UR QL STRIP: NEGATIVE
HYALINE CASTS #/AREA URNS LPF: 23 /LPF (ref 0–2)
IGA SERPL-MCNC: 38 MG/DL (ref 70–380)
IGG SERPL-MCNC: 515 MG/DL (ref 695–1620)
IGM SERPL-MCNC: 39 MG/DL (ref 60–265)
IMM GRANULOCYTES # BLD: 0 10E9/L (ref 0–0.4)
IMM GRANULOCYTES NFR BLD: 0.3 %
KAPPA LC UR-MCNC: 1.18 MG/DL (ref 0.33–1.94)
KAPPA LC/LAMBDA SER: 0.43 {RATIO} (ref 0.26–1.65)
KETONES UR STRIP-MCNC: NEGATIVE MG/DL
LAMBDA LC SERPL-MCNC: 2.72 MG/DL (ref 0.57–2.63)
LEUKOCYTE ESTERASE UR QL STRIP: NEGATIVE
LYMPHOCYTES # BLD AUTO: 0.4 10E9/L (ref 0.8–5.3)
LYMPHOCYTES NFR BLD AUTO: 11.6 %
M PROTEIN SERPL ELPH-MCNC: 0 G/DL
MCH RBC QN AUTO: 34.1 PG (ref 26.5–33)
MCHC RBC AUTO-ENTMCNC: 35.5 G/DL (ref 31.5–36.5)
MCV RBC AUTO: 96 FL (ref 78–100)
MONOCYTES # BLD AUTO: 0.6 10E9/L (ref 0–1.3)
MONOCYTES NFR BLD AUTO: 15.1 %
MUCOUS THREADS #/AREA URNS LPF: PRESENT /LPF
NEUTROPHILS # BLD AUTO: 2.5 10E9/L (ref 1.6–8.3)
NEUTROPHILS NFR BLD AUTO: 67.3 %
NITRATE UR QL: NEGATIVE
NRBC # BLD AUTO: 0 10*3/UL
NRBC BLD AUTO-RTO: 0 /100
PH UR STRIP: 5 PH (ref 5–7)
PLATELET # BLD AUTO: 117 10E9/L (ref 150–450)
POTASSIUM SERPL-SCNC: 5.1 MMOL/L (ref 3.4–5.3)
PROT PATTERN SERPL ELPH-IMP: ABNORMAL
PROT PATTERN SERPL IFE-IMP: ABNORMAL
RBC # BLD AUTO: 3.08 10E12/L (ref 4.4–5.9)
RBC #/AREA URNS AUTO: <1 /HPF (ref 0–2)
SODIUM SERPL-SCNC: 140 MMOL/L (ref 133–144)
SODIUM UR-SCNC: 58 MMOL/L
SOURCE: ABNORMAL
SP GR UR STRIP: 1.01 (ref 1–1.03)
UROBILINOGEN UR STRIP-MCNC: NORMAL MG/DL (ref 0–2)
WBC # BLD AUTO: 3.7 10E9/L (ref 4–11)
WBC #/AREA URNS AUTO: 1 /HPF (ref 0–5)

## 2019-05-10 PROCEDURE — 84300 ASSAY OF URINE SODIUM: CPT | Performed by: EMERGENCY MEDICINE

## 2019-05-10 PROCEDURE — 99207 ZZC CDG-HISTORY COMP: MEETS EXP. PROBLEM FOCUSED - DOWN CODED LACK OF HPI: CPT | Performed by: HOSPITALIST

## 2019-05-10 PROCEDURE — 80048 BASIC METABOLIC PNL TOTAL CA: CPT | Performed by: EMERGENCY MEDICINE

## 2019-05-10 PROCEDURE — G0378 HOSPITAL OBSERVATION PER HR: HCPCS

## 2019-05-10 PROCEDURE — 96375 TX/PRO/DX INJ NEW DRUG ADDON: CPT

## 2019-05-10 PROCEDURE — 25000128 H RX IP 250 OP 636: Performed by: HOSPITALIST

## 2019-05-10 PROCEDURE — 25800030 ZZH RX IP 258 OP 636: Performed by: HOSPITALIST

## 2019-05-10 PROCEDURE — 82570 ASSAY OF URINE CREATININE: CPT | Performed by: EMERGENCY MEDICINE

## 2019-05-10 PROCEDURE — 99218 ZZC INITIAL OBSERVATION CARE,LEVL I: CPT | Performed by: HOSPITALIST

## 2019-05-10 PROCEDURE — 96374 THER/PROPH/DIAG INJ IV PUSH: CPT

## 2019-05-10 PROCEDURE — 00000146 ZZHCL STATISTIC GLUCOSE BY METER IP

## 2019-05-10 PROCEDURE — 25000132 ZZH RX MED GY IP 250 OP 250 PS 637: Performed by: HOSPITALIST

## 2019-05-10 PROCEDURE — 83036 HEMOGLOBIN GLYCOSYLATED A1C: CPT | Performed by: EMERGENCY MEDICINE

## 2019-05-10 PROCEDURE — 85025 COMPLETE CBC W/AUTO DIFF WBC: CPT | Performed by: EMERGENCY MEDICINE

## 2019-05-10 PROCEDURE — 81001 URINALYSIS AUTO W/SCOPE: CPT | Performed by: EMERGENCY MEDICINE

## 2019-05-10 PROCEDURE — 25000128 H RX IP 250 OP 636: Performed by: EMERGENCY MEDICINE

## 2019-05-10 PROCEDURE — 99285 EMERGENCY DEPT VISIT HI MDM: CPT | Mod: 25

## 2019-05-10 RX ORDER — DIVALPROEX SODIUM 500 MG/1
1000 TABLET, DELAYED RELEASE ORAL DAILY
Status: DISCONTINUED | OUTPATIENT
Start: 2019-05-11 | End: 2019-05-11 | Stop reason: HOSPADM

## 2019-05-10 RX ORDER — METFORMIN HCL 500 MG
1000 TABLET, EXTENDED RELEASE 24 HR ORAL 2 TIMES DAILY WITH MEALS
Status: ON HOLD | COMMUNITY
End: 2019-05-11

## 2019-05-10 RX ORDER — ACETAMINOPHEN 650 MG/1
650 SUPPOSITORY RECTAL EVERY 4 HOURS PRN
Status: DISCONTINUED | OUTPATIENT
Start: 2019-05-10 | End: 2019-05-11 | Stop reason: HOSPADM

## 2019-05-10 RX ORDER — ONDANSETRON 4 MG/1
4 TABLET, ORALLY DISINTEGRATING ORAL EVERY 6 HOURS PRN
Status: DISCONTINUED | OUTPATIENT
Start: 2019-05-10 | End: 2019-05-11 | Stop reason: HOSPADM

## 2019-05-10 RX ORDER — ATORVASTATIN CALCIUM 10 MG/1
10 TABLET, FILM COATED ORAL DAILY
Status: DISCONTINUED | OUTPATIENT
Start: 2019-05-11 | End: 2019-05-11 | Stop reason: HOSPADM

## 2019-05-10 RX ORDER — DOXEPIN HYDROCHLORIDE 50 MG/1
50 CAPSULE ORAL AT BEDTIME
Status: DISCONTINUED | OUTPATIENT
Start: 2019-05-10 | End: 2019-05-11 | Stop reason: HOSPADM

## 2019-05-10 RX ORDER — AMOXICILLIN 250 MG
2 CAPSULE ORAL 2 TIMES DAILY PRN
Status: DISCONTINUED | OUTPATIENT
Start: 2019-05-10 | End: 2019-05-11 | Stop reason: HOSPADM

## 2019-05-10 RX ORDER — ACETAMINOPHEN 325 MG/1
650 TABLET ORAL EVERY 4 HOURS PRN
Status: DISCONTINUED | OUTPATIENT
Start: 2019-05-10 | End: 2019-05-11 | Stop reason: HOSPADM

## 2019-05-10 RX ORDER — LURASIDONE HYDROCHLORIDE 40 MG/1
40 TABLET, FILM COATED ORAL AT BEDTIME
COMMUNITY
End: 2020-07-27

## 2019-05-10 RX ORDER — ASPIRIN 81 MG/1
81 TABLET ORAL DAILY
Status: DISCONTINUED | OUTPATIENT
Start: 2019-05-11 | End: 2019-05-11 | Stop reason: HOSPADM

## 2019-05-10 RX ORDER — HYDROMORPHONE HYDROCHLORIDE 1 MG/ML
.3-.5 INJECTION, SOLUTION INTRAMUSCULAR; INTRAVENOUS; SUBCUTANEOUS
Status: DISCONTINUED | OUTPATIENT
Start: 2019-05-10 | End: 2019-05-11

## 2019-05-10 RX ORDER — HYDROCHLOROTHIAZIDE 12.5 MG/1
12.5 TABLET ORAL DAILY
Status: ON HOLD | COMMUNITY
End: 2019-05-11

## 2019-05-10 RX ORDER — HYDROCODONE BITARTRATE AND ACETAMINOPHEN 5; 325 MG/1; MG/1
1 TABLET ORAL EVERY 6 HOURS PRN
Status: DISCONTINUED | OUTPATIENT
Start: 2019-05-10 | End: 2019-05-11 | Stop reason: HOSPADM

## 2019-05-10 RX ORDER — MORPHINE SULFATE 4 MG/ML
4 INJECTION, SOLUTION INTRAMUSCULAR; INTRAVENOUS ONCE
Status: COMPLETED | OUTPATIENT
Start: 2019-05-10 | End: 2019-05-10

## 2019-05-10 RX ORDER — LIDOCAINE 40 MG/G
CREAM TOPICAL
Status: DISCONTINUED | OUTPATIENT
Start: 2019-05-10 | End: 2019-05-11 | Stop reason: HOSPADM

## 2019-05-10 RX ORDER — PREGABALIN 100 MG/1
100 CAPSULE ORAL 4 TIMES DAILY
COMMUNITY
End: 2019-08-26

## 2019-05-10 RX ORDER — DEXTROSE MONOHYDRATE 25 G/50ML
25-50 INJECTION, SOLUTION INTRAVENOUS
Status: DISCONTINUED | OUTPATIENT
Start: 2019-05-10 | End: 2019-05-11 | Stop reason: HOSPADM

## 2019-05-10 RX ORDER — POLYETHYLENE GLYCOL 3350 17 G/17G
17 POWDER, FOR SOLUTION ORAL DAILY PRN
Status: DISCONTINUED | OUTPATIENT
Start: 2019-05-10 | End: 2019-05-11 | Stop reason: HOSPADM

## 2019-05-10 RX ORDER — PREGABALIN 50 MG/1
100 CAPSULE ORAL 4 TIMES DAILY
Status: DISCONTINUED | OUTPATIENT
Start: 2019-05-10 | End: 2019-05-11 | Stop reason: HOSPADM

## 2019-05-10 RX ORDER — SODIUM CHLORIDE 9 MG/ML
INJECTION, SOLUTION INTRAVENOUS CONTINUOUS
Status: DISCONTINUED | OUTPATIENT
Start: 2019-05-10 | End: 2019-05-11 | Stop reason: HOSPADM

## 2019-05-10 RX ORDER — BISACODYL 10 MG
10 SUPPOSITORY, RECTAL RECTAL DAILY PRN
Status: DISCONTINUED | OUTPATIENT
Start: 2019-05-10 | End: 2019-05-11 | Stop reason: HOSPADM

## 2019-05-10 RX ORDER — NICOTINE POLACRILEX 4 MG
15-30 LOZENGE BUCCAL
Status: DISCONTINUED | OUTPATIENT
Start: 2019-05-10 | End: 2019-05-11 | Stop reason: HOSPADM

## 2019-05-10 RX ORDER — LURASIDONE HYDROCHLORIDE 40 MG/1
40 TABLET, FILM COATED ORAL AT BEDTIME
Status: DISCONTINUED | OUTPATIENT
Start: 2019-05-10 | End: 2019-05-11 | Stop reason: HOSPADM

## 2019-05-10 RX ORDER — ONDANSETRON 2 MG/ML
4 INJECTION INTRAMUSCULAR; INTRAVENOUS EVERY 6 HOURS PRN
Status: DISCONTINUED | OUTPATIENT
Start: 2019-05-10 | End: 2019-05-11 | Stop reason: HOSPADM

## 2019-05-10 RX ORDER — AMOXICILLIN 250 MG
1 CAPSULE ORAL 2 TIMES DAILY PRN
Status: DISCONTINUED | OUTPATIENT
Start: 2019-05-10 | End: 2019-05-11 | Stop reason: HOSPADM

## 2019-05-10 RX ORDER — MODAFINIL 200 MG/1
200 TABLET ORAL DAILY
Status: DISCONTINUED | OUTPATIENT
Start: 2019-05-11 | End: 2019-05-11 | Stop reason: HOSPADM

## 2019-05-10 RX ORDER — NALOXONE HYDROCHLORIDE 0.4 MG/ML
.1-.4 INJECTION, SOLUTION INTRAMUSCULAR; INTRAVENOUS; SUBCUTANEOUS
Status: DISCONTINUED | OUTPATIENT
Start: 2019-05-10 | End: 2019-05-11 | Stop reason: HOSPADM

## 2019-05-10 RX ADMIN — HYDROMORPHONE HYDROCHLORIDE 0.3 MG: 1 INJECTION, SOLUTION INTRAMUSCULAR; INTRAVENOUS; SUBCUTANEOUS at 22:11

## 2019-05-10 RX ADMIN — MORPHINE SULFATE 4 MG: 4 INJECTION INTRAVENOUS at 19:40

## 2019-05-10 RX ADMIN — SODIUM CHLORIDE 1000 ML: 9 INJECTION, SOLUTION INTRAVENOUS at 18:58

## 2019-05-10 RX ADMIN — DOXEPIN HYDROCHLORIDE 50 MG: 50 CAPSULE ORAL at 22:12

## 2019-05-10 RX ADMIN — PREGABALIN 100 MG: 50 CAPSULE ORAL at 22:12

## 2019-05-10 RX ADMIN — LURASIDONE HYDROCHLORIDE 40 MG: 40 TABLET, FILM COATED ORAL at 22:12

## 2019-05-10 RX ADMIN — SODIUM CHLORIDE: 9 INJECTION, SOLUTION INTRAVENOUS at 22:14

## 2019-05-10 ASSESSMENT — ENCOUNTER SYMPTOMS
ABDOMINAL PAIN: 0
CONSTIPATION: 0
BLOOD IN STOOL: 0
SHORTNESS OF BREATH: 0

## 2019-05-10 NOTE — ED PROVIDER NOTES
"  History     Chief Complaint:  Abnormal Labs      HPI   Parmjit Hernández is a 63 year old male with amyloidosis who presents with abnormal labs. The patient had blood drawn yesterday and his creatinine levels were high at 1.9, so he was advised to come into the emergency department to be re-evaluated, as well as determine if kidney function is normal. Here, the patient notes that he does not have any new pain, but has normal chronic back pain. He notes that he is eating and drinking okay, but only has a meal once a day. He also reports that he is \"slow to urinate\" so he was prescribed 5 mg hydrochlorothiazide for this, as well as for control of blood pressure. He denies any out of ordinary shortness of breath, chest discomfort, abdominal pain, bloody stool, or constipation. The patient had a pain pump put in in December.         Allergies:  Cephalexin  Liraglutide  Sulfa drugs  Victoza       Medications:    Lipitor  Depakote  Doxepin  Glipizide  Hydrochlorothiazide  Lisinopril  Latuda  Lyrica  Modafinal  Rizatriptan  Sumatriptan      Past Medical History:    Allergic state  Amyloidosis  Diabetes Mellitus  Hypertension  Obsessive compulsive disorders  Hypercholesterolemia  Sleep apnea  Anxiety  Chronic abdominal pain  Bipolar I disorder      Past Surgical History:    Bone marrow biopsy  Cholecystectomy  Repair deviated septum  Cholecystectomy  Hernia repair      Family History:    Cerebrovascular disease  Coronary artery disease  Hypertension  Alcohol drug  Arthritis  Alcohol/drug  Circulatory  Depression  Respiratory  Asthma  Anxiety  Allergies    Social History:  Smoking status: Never smoker  Alcohol use: No  Drug use: No  PCP: Vince Henry  Presents to the ED with himself  Marital Status:   [4]       Review of Systems   Respiratory: Negative for shortness of breath.    Cardiovascular: Negative for chest pain.   Gastrointestinal: Negative for abdominal pain, blood in stool and constipation.   All other " "systems reviewed and are negative.    Physical Exam     Patient Vitals for the past 24 hrs:   BP Temp Temp src Pulse Resp SpO2 Height   05/10/19 1530 92/57 98.1  F (36.7  C) Temporal 72 18 94 % 1.702 m (5' 7\")         Physical Exam  Constitutional: Well developed, nontox appearance  Head: Atraumatic.   Mouth/Throat: Oropharynx is clear and moist.   Neck:  no stridor  Eyes: no scleral icterus  Cardiovascular: RRR, 2+ bilat radial PT pulses  Pulmonary/Chest: nml resp effort, Clear BS bilat  Abdominal: ND, +BS, soft, NT, no rebound or guarding   : no CVA tenderness bilat  Ext: Warm, well perfused, no edema  Neurological: A&O, symmetric facies, moves ext x4  Skin: Skin is warm and dry.   Psychiatric: Behavior is normal. Thought content normal.   Nursing note and vitals reviewed.    Emergency Department Course   Laboratory:  UA with Microscopic: Mucous present, hyaline casts 23 (H)  Sodium random urine: 58  Creatinine random urine: 155  CBC: (WBC 3.7 (L), HGB 10.5 (L),  (L)  BMP: glucose 116 (H), BUN 52 (H), Creatinine 2.18 (H), GFR 31 (L)  Hemoglobin A1c: 4.7    Interventions:  1858: NS 1L IV Bolus  0: 4 mg Morphine IV    Emergency Department Course:  Past medical records, nursing notes, and vitals reviewed.  1620: I performed an exam of the patient and obtained history, as documented above.    IV inserted and blood drawn.    UA performed, findings above.    : Discussed the patient with Dr. Vásquez, who will admit the patient to an observation bed for further monitoring, evaluation, and treatment.     Findings and plan explained to the patient who consents to admission.     : I rechecked the patient. Explained findings to patient.    Impression & Plan    Medical Decision Makin-year-old male presenting with concern for elevated creatinine level on lab check 2019     Differential diagnosis includes acute kidney injury, postobstructive nephropathy, nephropathy NOS, medication adverse " reaction, electrolyte abnormality.  Bladder scan demonstrated postvoid residual of 99 mL demonstrated no urinary retention.  Labs significant for intervally worsened creatinine from 1.9-2.2, potassium within normal limits, mild pancytopenia.  UA demonstrated hyaline casts otherwise consistent with infection. FeNA 0.6% indicating possible prerenal etiology.  IV fluids given in the emergency department.  Given significant elevation in comparison to February 2019 and over the last 24 hours, I think it would be reasonable to admit the patient for further evaluation and management.  He was counseled on the results, diagnosis and disposition prior to admission.  He is understanding and agreeable to plan.  Patient was subsequently admitted in stable condition.    Diagnosis:    ICD-10-CM   1. Acute kidney injury (H) N17.9       Disposition: Admitted to observation bed.    IDebby, am serving as a scribe at 4:20 PM on 5/10/2019 to document services personally performed by Dru Medina MD based on my observations and the provider's statements to me.       Debby Shah  5/10/2019    EMERGENCY DEPARTMENT       Dru Medina MD  05/11/19 0201

## 2019-05-10 NOTE — PROGRESS NOTES
Message left for Erickson ( Napoleon Vigil, NP) regarding Lab results - specifically elevated creatinine.  Requested that he return my call today.    If he does we can get him in for fluids here at the clinic today at 1230 and re-check creatinine after.  Musa would also like Fluids on Sat & Sun.    He has an appointment with Dr. Castro on Thursday.

## 2019-05-10 NOTE — PROGRESS NOTES
Another message left for patient regarding creatinine level drawn yesterday, requested call back.

## 2019-05-10 NOTE — PROGRESS NOTES
Erickson called back, Per Musa advised that he go to the ER for elevated Creatinine level drawn yesterday .  RICHARDSON Vigil spoke with patient as well.  Erickson verbalized understanding of plan.    Will follow up here in the office Monday morning at 830 with a CBC, CMP, 9am appointment with Musa and Fluids after.

## 2019-05-10 NOTE — PROGRESS NOTES
Patient with AL kappa amyloidosis subtype in the lymph node and bone marrow.  Patient of Dr. Castro's  Currently not on treatment- he is on observation for amyloidosis     Patient had labs drawn yesterday.  Creatinine is 1.85 (  2 months ago his creatinine was normal).    We tried to get in touch with the patient to come to the clinic today  for hydration and follow-up of the elevated creatinine.We were unsuccessful to get in touch with patient after leaving multiple messages on his phone.    He  finally answered the phone.  It is already late in the day. I asked patient to go to the emergency room for evaluation of elevated creatinine.  CBC stable, protein immunofixation is pending    Schedule with me on Monday 05/13 for follow-up  He has follow-up appointment with Dr. Castro on Thursday 05/16     Musa Masters NP

## 2019-05-11 ENCOUNTER — APPOINTMENT (OUTPATIENT)
Dept: ULTRASOUND IMAGING | Facility: CLINIC | Age: 64
End: 2019-05-11
Attending: INTERNAL MEDICINE
Payer: COMMERCIAL

## 2019-05-11 VITALS
HEART RATE: 74 BPM | HEIGHT: 67 IN | TEMPERATURE: 97.3 F | WEIGHT: 250.2 LBS | BODY MASS INDEX: 39.27 KG/M2 | SYSTOLIC BLOOD PRESSURE: 104 MMHG | DIASTOLIC BLOOD PRESSURE: 61 MMHG | RESPIRATION RATE: 18 BRPM | OXYGEN SATURATION: 95 %

## 2019-05-11 LAB
ANION GAP SERPL CALCULATED.3IONS-SCNC: 4 MMOL/L (ref 3–14)
ANION GAP SERPL CALCULATED.3IONS-SCNC: 7 MMOL/L (ref 3–14)
BUN SERPL-MCNC: 29 MG/DL (ref 7–30)
BUN SERPL-MCNC: 40 MG/DL (ref 7–30)
CALCIUM SERPL-MCNC: 8.1 MG/DL (ref 8.5–10.1)
CALCIUM SERPL-MCNC: 8.5 MG/DL (ref 8.5–10.1)
CHLORIDE SERPL-SCNC: 111 MMOL/L (ref 94–109)
CHLORIDE SERPL-SCNC: 111 MMOL/L (ref 94–109)
CO2 SERPL-SCNC: 24 MMOL/L (ref 20–32)
CO2 SERPL-SCNC: 29 MMOL/L (ref 20–32)
CREAT SERPL-MCNC: 1.28 MG/DL (ref 0.66–1.25)
CREAT SERPL-MCNC: 1.46 MG/DL (ref 0.66–1.25)
ERYTHROCYTE [DISTWIDTH] IN BLOOD BY AUTOMATED COUNT: 15.1 % (ref 10–15)
GFR SERPL CREATININE-BSD FRML MDRD: 51 ML/MIN/{1.73_M2}
GFR SERPL CREATININE-BSD FRML MDRD: 59 ML/MIN/{1.73_M2}
GLUCOSE BLDC GLUCOMTR-MCNC: 107 MG/DL (ref 70–99)
GLUCOSE BLDC GLUCOMTR-MCNC: 93 MG/DL (ref 70–99)
GLUCOSE SERPL-MCNC: 108 MG/DL (ref 70–99)
GLUCOSE SERPL-MCNC: 87 MG/DL (ref 70–99)
HCT VFR BLD AUTO: 31.1 % (ref 40–53)
HGB BLD-MCNC: 11 G/DL (ref 13.3–17.7)
MCH RBC QN AUTO: 33.8 PG (ref 26.5–33)
MCHC RBC AUTO-ENTMCNC: 35.4 G/DL (ref 31.5–36.5)
MCV RBC AUTO: 96 FL (ref 78–100)
PLATELET # BLD AUTO: 114 10E9/L (ref 150–450)
POTASSIUM SERPL-SCNC: 5 MMOL/L (ref 3.4–5.3)
POTASSIUM SERPL-SCNC: 5.1 MMOL/L (ref 3.4–5.3)
RBC # BLD AUTO: 3.25 10E12/L (ref 4.4–5.9)
SODIUM SERPL-SCNC: 142 MMOL/L (ref 133–144)
SODIUM SERPL-SCNC: 144 MMOL/L (ref 133–144)
WBC # BLD AUTO: 3 10E9/L (ref 4–11)

## 2019-05-11 PROCEDURE — 96376 TX/PRO/DX INJ SAME DRUG ADON: CPT

## 2019-05-11 PROCEDURE — 25000132 ZZH RX MED GY IP 250 OP 250 PS 637: Performed by: HOSPITALIST

## 2019-05-11 PROCEDURE — 85027 COMPLETE CBC AUTOMATED: CPT | Performed by: HOSPITALIST

## 2019-05-11 PROCEDURE — G0378 HOSPITAL OBSERVATION PER HR: HCPCS

## 2019-05-11 PROCEDURE — 25800030 ZZH RX IP 258 OP 636: Performed by: HOSPITALIST

## 2019-05-11 PROCEDURE — 80048 BASIC METABOLIC PNL TOTAL CA: CPT | Performed by: HOSPITALIST

## 2019-05-11 PROCEDURE — 99217 ZZC OBSERVATION CARE DISCHARGE: CPT | Performed by: HOSPITALIST

## 2019-05-11 PROCEDURE — 36415 COLL VENOUS BLD VENIPUNCTURE: CPT | Performed by: HOSPITALIST

## 2019-05-11 PROCEDURE — 25000128 H RX IP 250 OP 636: Performed by: HOSPITALIST

## 2019-05-11 PROCEDURE — 36415 COLL VENOUS BLD VENIPUNCTURE: CPT | Performed by: INTERNAL MEDICINE

## 2019-05-11 PROCEDURE — 76770 US EXAM ABDO BACK WALL COMP: CPT

## 2019-05-11 PROCEDURE — 80048 BASIC METABOLIC PNL TOTAL CA: CPT | Performed by: INTERNAL MEDICINE

## 2019-05-11 PROCEDURE — 00000146 ZZHCL STATISTIC GLUCOSE BY METER IP

## 2019-05-11 RX ORDER — HYDROMORPHONE HYDROCHLORIDE 1 MG/ML
.3-.5 INJECTION, SOLUTION INTRAMUSCULAR; INTRAVENOUS; SUBCUTANEOUS
Status: DISCONTINUED | OUTPATIENT
Start: 2019-05-11 | End: 2019-05-11 | Stop reason: HOSPADM

## 2019-05-11 RX ADMIN — PREGABALIN 100 MG: 50 CAPSULE ORAL at 16:26

## 2019-05-11 RX ADMIN — MODAFINIL 200 MG: 200 TABLET ORAL at 07:56

## 2019-05-11 RX ADMIN — PREGABALIN 100 MG: 50 CAPSULE ORAL at 12:27

## 2019-05-11 RX ADMIN — ASPIRIN 81 MG: 81 TABLET, COATED ORAL at 07:56

## 2019-05-11 RX ADMIN — SODIUM CHLORIDE: 9 INJECTION, SOLUTION INTRAVENOUS at 07:55

## 2019-05-11 RX ADMIN — HYDROMORPHONE HYDROCHLORIDE 0.5 MG: 1 INJECTION, SOLUTION INTRAMUSCULAR; INTRAVENOUS; SUBCUTANEOUS at 11:00

## 2019-05-11 RX ADMIN — PREGABALIN 100 MG: 50 CAPSULE ORAL at 07:55

## 2019-05-11 RX ADMIN — HYDROMORPHONE HYDROCHLORIDE 0.5 MG: 1 INJECTION, SOLUTION INTRAMUSCULAR; INTRAVENOUS; SUBCUTANEOUS at 01:44

## 2019-05-11 RX ADMIN — HYDROCODONE BITARTRATE AND ACETAMINOPHEN 1 TABLET: 5; 325 TABLET ORAL at 05:42

## 2019-05-11 RX ADMIN — HYDROMORPHONE HYDROCHLORIDE 0.5 MG: 1 INJECTION, SOLUTION INTRAMUSCULAR; INTRAVENOUS; SUBCUTANEOUS at 14:49

## 2019-05-11 RX ADMIN — ATORVASTATIN CALCIUM 10 MG: 10 TABLET, FILM COATED ORAL at 07:55

## 2019-05-11 RX ADMIN — HYDROMORPHONE HYDROCHLORIDE 0.5 MG: 1 INJECTION, SOLUTION INTRAMUSCULAR; INTRAVENOUS; SUBCUTANEOUS at 07:56

## 2019-05-11 RX ADMIN — DIVALPROEX SODIUM 1000 MG: 500 TABLET, DELAYED RELEASE ORAL at 07:55

## 2019-05-11 ASSESSMENT — MIFFLIN-ST. JEOR: SCORE: 1893.53

## 2019-05-11 NOTE — ED NOTES
"[facility]  ED Nurse Handoff Report    ED Chief complaint: Abnormal Labs      ED Diagnosis:   Final diagnoses:   Acute kidney injury (H)       Code Status: Full Code    Allergies:   Allergies   Allergen Reactions     Cephalexin Diarrhea     Liraglutide Other (See Comments)     Sulfa Drugs Swelling     Pt has taken  Taken sulfa drugs orally without trouble. He had problems with Sulfa eye drops. Eye swelled up     Victoza      Increased migraine frequency and severity       Activity level - Baseline/Home:  Independent    Activity Level - Current:   Independent     Needed?: No    Isolation: No  Infection: Not Applicable  Bariatric?: Yes    Vital Signs:   Vitals:    05/10/19 1530   BP: 92/57   Pulse: 72   Resp: 18   Temp: 98.1  F (36.7  C)   TempSrc: Temporal   SpO2: 94%   Height: 1.702 m (5' 7\")       Cardiac Rhythm: ,        Pain level: 0-10 Pain Scale: 0    Is this patient confused?: No   Does this patient have a guardian?  No         If yes, is there guardianship documents in the Epic \"Code/ACP\" activity?  N/A         Guardian Notified?  N/A  [place] - Suicide Severity Rating Scale Completed?  Yes  If yes, what color did the patient score?  White    Patient Report: Initial Complaint: Parmjit Hernández is a 63 year old male  who presents to the emergency department with abnormal labs.      Focused Assessment: Cardiac: WDL  Resp: WDL  Neuro: A&Ox4  Misc: Pt has implanted pain pump.  Tests Performed: labs  Abnormal Results: see labs  Treatments provided: NS 1000ml bolus    Family Comments: NA    OBS brochure/video discussed/provided to patient/family: No              Name of person given brochure if not patient: na              Relationship to patient: na    ED Medications:   Medications   0.9% sodium chloride BOLUS (1,000 mLs Intravenous New Bag 5/10/19 8600)       Drips infusing?:  Yes    For the majority of the shift this patient was Green.   Interventions performed were monitor.    Severe " Sepsis OR Septic Shock Diagnosis Present: No    To be done/followed up on inpatient unit:  continue care, pt reports that he left his pain pump control in his vehicle and will need to get it at some point tonight.     ED NURSE PHONE NUMBER: *[phone]

## 2019-05-11 NOTE — PROGRESS NOTES
[facility]    Medicine Progress Note - Hospitalist Service       Date of Admission:  5/10/2019  Assessment & Plan   Parmjit Hernández is a 63 year old male who is admitted with an elevated creatinine.  He has a history of bone marrow transplantation for systemic amyloidosis as well as type 2 diabetes mellitus, hypertension, anemia, pain pump for lower extremity neuropathy and bipolar disorder.    Acute kidney injury  History of kidney disease prior to treatment for amyloidosis   Possible history of bladder outlet obstruction   Status post bone marrow transplant for systemic amyloidosis  Probably prerenal- renal function is improving with intravenous fluid   Awaiting recommendations from the nephrologist    Hypertension   Currently normotensive  Continue to hold ACE inhibitor and diuretic    Hyperlipidemia   Continue statin    Type 2 diabetes mellitus   Hgb A1c <5%  Stop OHA's    Chronic pain with pain pump due to lower extremity neuropathy   He does not have the pump controller with him  Continue pregabalin   Continue intravenous hydromorphone as needed    Bipolar disorder   OCD  Stable, continue home medications     Mild pancytopenia  Follow up CBC with primary care provider     Diet: Regular Diet Adult    DVT Prophylaxis: low risk  Parker Catheter: not present  Code Status: Full Code      Disposition Plan   Expected discharge: Tomorrow, recommended to prior living arrangement once renal function improved.  Entered: Dru De Guzman MD 05/11/2019, 2:10 PM       The patient's care was discussed with the Bedside Nurse, Patient and Renal Consultant.    Dru De Guzman MD  Hospitalist Service  [facility]    ______________________________________________________________________    Interval History   No pain or complaints     Data reviewed today: I reviewed all medications, new labs and imaging results over the last 24 hours. I personally reviewed no images or EKG's  today.    Physical Exam   Vital Signs: Temp: 96.7  F (35.9  C) Temp src: Oral BP: 125/80 Pulse: 83 Heart Rate: 80 Resp: 18 SpO2: 97 % O2 Device: None (Room air)    Weight: 250 lbs 3.2 oz  Constitutional: awake, alert, cooperative, no apparent distress, and appears stated age  Respiratory: No increased work of breathing, good air exchange, clear to auscultation bilaterally, no crackles or wheezing  Cardiovascular: regular rate and rhythm, normal S1 and S2, no S3 or S4, and no murmur noted  GI: No scars, normal bowel sounds, soft, non-distended, non-tender  Skin: no rashes and no jaundice  Neurologic: Awake, alert, oriented to name, place and time.  Cranial nerves II-XII are grossly intact.  Motor is 5 out of 5 bilaterally.  Neuropsychiatric: General: normal, calm and normal eye contact    Data   Recent Labs   Lab 05/11/19  0559 05/10/19  1616 05/09/19  1202   WBC 3.0* 3.7* 5.2   HGB 11.0* 10.5* 11.8*   MCV 96 96 97   * 117* 154    140 140   POTASSIUM 5.0 5.1 4.8   CHLORIDE 111* 108 107   CO2 24 24 26   BUN 40* 52* 50*   CR 1.46* 2.18* 1.89*   ANIONGAP 7 8 7   LUIS 8.1* 8.5 8.4*   GLC 87 116* 82   ALBUMIN  --   --  3.8   PROTTOTAL  --   --  6.8   BILITOTAL  --   --  0.5   ALKPHOS  --   --  84   ALT  --   --  32   AST  --   --  18     No results found for this or any previous visit (from the past 24 hour(s)).

## 2019-05-11 NOTE — CONSULTS
See dictation.    Probably baseline normal renal function.  Current ARF seems to be improving rapidly with IV volume repletion and holding lisinopril and hydrochlorothiazide.  Earlier somewhat low BP has now normalized.  Pre-renal etiology due to volume depletion seems likely.  I wonder if his hypertension is being overtreated.  Transient ARF in December 2018 was associated with inability to void, maybe due to BPH.  Note PSA earlier this year 0.19.  No imaging done then, but no sign of obstruction was seen on CT of abd/pelvis 3 months earlier.  Prostate looked normal then.     Agree with holding meds for HBP and DM-2.  Latter may also be overtreated with A1c <5% and glucoses as low as 65.      Check US of kidneys and bladder now, though obstruction seems unlikely.     I think pt can be discharged soon, maybe later today, if another creatinine check is lower.  He can follow-up with PCP re: adjusting meds for HBP and DM-2.    Thanks.    Jesse Isaac MD  Nephrology; [employer]  [phone]

## 2019-05-11 NOTE — PLAN OF CARE
Patient has been discharged May 11, 2019 5:02 PM. Discharge instructions and education reviewed. Medication schedule and followup appointments discussed. Patient had no questions. All belongings sent home with patient.

## 2019-05-11 NOTE — H&P
[facility]    History and Physical  Hospitalist       Date of Admission:  5/10/2019  Date of Service (when I saw the patient): 05/10/19    Assessment & Plan   Parmjit Hernández is a 63 year old male who presents with renal failure.  Admitted for further evaluation and treatment.    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  -Nephrology consulted.  -IV fluids.  -Avoid nephrotoxins.    Hypertension, hyperlipidemia: Stable.  -Continue prior to admission aspirin and atorvastatin.  -Hold lisinopril.    Chronic pain with pain pump: Patient with chronic pain pump on admission.  -Continue chronic pain pump at this time as able, however, patient doesn't have controller he states, it's in his car somewhere.   -PRN pain medication available if needed.    Diabetes: Stable.  -Hold oral home meds.  -Insulin sliding scale.    Psychiatric: Stable.  -Continue prior to admission Latuda.  -Continue prior to admission Depakote.  -Continue prior to admission modafinil.    Code Status: Full Code    Disposition: Observation.    Dr. Guido Vásquez D.O.  [facility] Hospitalist  Pager [phone]    Primary Care Physician   Vince Henry    Chief Complaint   Elevated creatinine.    History is obtained from the patient and medical records.     History of Present Illness   Parmjit Hernández is a 63 year old male who presents with renal failure.  Admitted for further evaluation and treatment. Patient with a history of amyloidosis followed by nephrology.  Patient had a recheck of his labs and was sent to the emergency department and ultimately admitted.  Patient denies chest pain, shortness of breath, abdominal pain, nausea, vomiting, blood in the stool, constipation, diarrhea, rash, fever, sore throat, headache.    Past Medical History    I have reviewed this patient's medical history and updated it with pertinent  information if needed.   Past Medical History:   Diagnosis Date     Allergic state      Amyloidosis (H)      Diabetes mellitus (H)     type 2     Headache(784.0)      Hypertension 2007     Myalgia and myositis, unspecified      Obsessive-compulsive disorders      Other acquired absence of organ 94     Other specified viral warts      Pain in the abdomen      Pure hypercholesterolemia      Sleep apnea        Past Surgical History   I have reviewed this patient's surgical history and updated it with pertinent information if needed.  Past Surgical History:   Procedure Laterality Date     BONE MARROW BIOPSY, BONE SPECIMEN, NEEDLE/TROCAR N/A 6/8/2016    Procedure: BIOPSY BONE MARROW;  Surgeon: Nathan Agrawal MD;  Location:  GI     BONE MARROW BIOPSY, BONE SPECIMEN, NEEDLE/TROCAR N/A 2/20/2019    Procedure: BIOPSY BONE MARROW;  Surgeon: Michael Raygoza MD;  Location:  GI     C NONSPECIFIC PROCEDURE  94    Cholecystectomy     C NONSPECIFIC PROCEDURE  2000    repair deviated septum     CHOLECYSTECTOMY  1995    lap qian     COLONOSCOPY  2012    hx polyps     ESOPHAGOSCOPY, GASTROSCOPY, DUODENOSCOPY (EGD), COMBINED N/A 5/29/2015    Procedure: COMBINED ESOPHAGOSCOPY, GASTROSCOPY, DUODENOSCOPY (EGD), BIOPSY SINGLE OR MULTIPLE;  Surgeon: John Jacob MD;  Location:  GI     HERNIA REPAIR, UMBILICAL  2006       Prior to Admission Medications   Prior to Admission Medications   Prescriptions Last Dose Informant Patient Reported? Taking?   aspirin (ASPIRIN LOW DOSE) 81 MG tablet   No No   Sig: Take 1 tablet (81 mg) by mouth daily   atorvastatin (LIPITOR) 10 MG tablet   No No   Sig: Take 1 tablet (10 mg) by mouth daily   blood glucose (ACCU-CHEK COMPACT DRUM) test strip   No No   Sig: Use to test blood sugars 3 to 4 times daily.   blood glucose (ACCU-CHEK MULTICLIX) lancing device   No No   Sig: Lancing device to be used with lancets.   blood glucose monitoring (ACCU-CHEK COMPACT CARE KIT) meter  device kit   No No   Sig: Use to test blood sugars 3 to 4  times daily.   divalproex (DEPAKOTE) 500 MG EC tablet   No No   Sig: Take 2 tablets (1,000 mg) by mouth daily   doxepin (SINEQUAN) 25 MG capsule   Yes No   Sig: Take 25 mg by mouth At Bedtime   glipiZIDE (GLUCOTROL XL) 2.5 MG 24 hr tablet   No No   Sig: Take 1 tablet (2.5 mg) by mouth daily   lisinopril (PRINIVIL/ZESTRIL) 20 MG tablet   No No   Sig: Take 1 tablet (20 mg) by mouth daily   modafinil (PROVIGIL) 200 MG tablet   Yes No   Sig: Take 200 mg by mouth daily   pregabalin (LYRICA) 50 MG capsule   Yes No   Sig: Take 100 mg by mouth 3 times daily Rx by [facility]       Facility-Administered Medications: None     Allergies   Allergies   Allergen Reactions     Cephalexin Diarrhea     Liraglutide Other (See Comments)     Sulfa Drugs Swelling     Pt has taken  Taken sulfa drugs orally without trouble. He had problems with Sulfa eye drops. Eye swelled up     Victoza      Increased migraine frequency and severity       Social History   I have reviewed this patient's social history and updated it with pertinent information if needed. Parmjit Hernández  reports that he has never smoked. He has never used smokeless tobacco. He reports that he does not drink alcohol or use drugs.    Family History   I have reviewed this patient's family history and updated it with pertinent information if needed.   Family History   Problem Relation Age of Onset     Cerebrovascular Disease Mother      C.A.D. Mother      Hypertension Mother      Alcohol/Drug Mother      Arthritis Mother      Cerebrovascular Disease Maternal Grandmother      C.A.D. Maternal Grandmother      Alcohol/Drug Maternal Grandmother      C.A.D. Father      Heart Disease Father      Hypertension Father      Alcohol/Drug Father      Allergies Father      Circulatory Father      Depression Father      Respiratory Father      Asthma Father      Alcohol/Drug Paternal Grandfather      Cerebrovascular Disease Paternal  Grandfather      Depression Son      Psychotic Disorder Son         anxiety disorder     Depression Daughter      Cerebrovascular Disease Maternal Grandfather      Cerebrovascular Disease Paternal Grandmother      Diabetes Brother      Allergies Sister      Depression Sister      Gynecology Sister      Coronary Artery Disease No family hx of      Hyperlipidemia No family hx of      Breast Cancer No family hx of      Colon Cancer No family hx of      Prostate Cancer No family hx of      Other Cancer No family hx of      Anxiety Disorder No family hx of      Mental Illness No family hx of      Substance Abuse No family hx of      Anesthesia Reaction No family hx of      Osteoporosis No family hx of      Genetic Disorder No family hx of      Thyroid Disease No family hx of      Obesity No family hx of      Unknown/Adopted No family hx of        Review of Systems   The 10 point Review of Systems is negative other than noted in the HPI or here.     Physical Exam   Temp: 98.1  F (36.7  C) Temp src: Temporal BP: 92/57 Pulse: 72   Resp: 18 SpO2: 94 % O2 Device: None (Room air)    Vital Signs with Ranges  Temp:  [98.1  F (36.7  C)] 98.1  F (36.7  C)  Pulse:  [72] 72  Resp:  [18] 18  BP: (92)/(57) 92/57  SpO2:  [94 %] 94 %  0 lbs 0 oz    GENERAL: Alert and oriented. NAD. Conversational, appropriate.   HEENT: Normocephalic. EOMI. No icterus or injection. Nares normal.   LUNGS: Clear to auscultation. No dyspnea at rest.   HEART: Regular rate. Extremities perfused.   ABDOMEN: Obese. Soft, nontender, and nondistended. Positive bowel sounds.   EXTREMITIES: No LE edema noted.   NEUROLOGIC: Moves extremities x4 on command. No acute focal neurologic abnormalities noted.     Data   Data reviewed today:  I personally reviewed both laboratory and imaging data.   Recent Labs   Lab 05/10/19  1616 05/09/19  1202   WBC 3.7* 5.2   HGB 10.5* 11.8*   MCV 96 97   * 154    140   POTASSIUM 5.1 4.8   CHLORIDE 108 107   CO2 24 26   BUN  52* 50*   CR 2.18* 1.89*   ANIONGAP 8 7   LUIS 8.5 8.4*   * 82   ALBUMIN  --  3.8   PROTTOTAL  --  6.8   BILITOTAL  --  0.5   ALKPHOS  --  84   ALT  --  32   AST  --  18       No results found for this or any previous visit (from the past 24 hour(s)).

## 2019-05-11 NOTE — PROGRESS NOTES
Observation goals PRIOR TO DISCHARGE    Comments:   -diagnostic tests and consults completed and resulted : Not met    -vital signs normal or at patient baseline : Met    -safe disposition plan has been identified : Not met, SW consulted.

## 2019-05-11 NOTE — PROGRESS NOTES
Renal Ultrasound normal.  Let's recheck BMP this afternoon, and discharge him if creatinine has fallen further.  It should then be rechecked next week.  I do not expect he needs any further care from our group.    Thanks.    Jesse Isaac MD  Nephrology; [employer]  [phone]

## 2019-05-11 NOTE — PROGRESS NOTES
Observation goals PRIOR TO DISCHARGE    Comments:     -Diagnostic tests and consults completed and resulted : Not met    -Vital signs normal or at patient baseline : Met    -Safe disposition plan has been identified : Not met, SW/CC consulted.       A&ox4,VSS on RA,christina LBP controlled with I.V dilaudid and norco.Has pain pump but said he forgot the control at home. Up with SBA, using urinal at BS.Tolerating reg diet, BS @ 0200 WAS 93.Baseline numbness/tingling present.Plan for neuro, SW/CC consult today. IVF Nacl cont@100

## 2019-05-11 NOTE — DISCHARGE SUMMARY
[facility]  Hospitalist Discharge Summary       Date of Admission:  5/10/2019  Date of Discharge:  5/11/2019  Discharging Provider: Dru De Guzman MD  Discharge Diagnoses   1. Acute kidney injury, prerenal  2. Mild pancytopenia    History of     Kidney disease prior to treatment for amyloidosis     Possible history of bladder outlet obstruction     Bone marrow transplant for systemic amyloidosis    Hypertension     Hyperlipidemia      Type 2 diabetes mellitus     Chronic pain with pain pump due to lower extremity neuropathy     Bipolar disorder     Obsessive compulsive disorder     Follow-ups Needed After Discharge   Follow-up Appointments     Follow-up and recommended labs and tests       BMP on 5/13; follow up with primary care provider, Vince Henry,   within 7 days for hospital follow- up.  The following labs/tests are   recommended: CBC and BMP.             Unresulted Labs Ordered in the Past 30 Days of this Admission     No orders found for last 61 day(s).          Discharge Disposition   Discharged to home  Condition at discharge: Stable    Hospital Course      Parmjit Hernández is a 63 year old male who is admitted with an elevated creatinine.  He has a history of bone marrow transplantation for systemic amyloidosis as well as type 2 diabetes mellitus, hypertension, anemia, pain pump for lower extremity neuropathy and bipolar disorder.    Acute kidney injury  History of kidney disease prior to treatment for amyloidosis   Possible history of bladder outlet obstruction   Status post bone marrow transplant for systemic amyloidosis  Probably prerenal- renal function is improving with intravenous fluid   He was seen by Dr. Isaac who recommended an ultrasound.  This was normal.  The patient will discharge home and continue to hold his ACE inhibitor and diuretic.  BMP on Monday.    Mild pancytopenia  Follow up CBC with primary care provider     Hypertension   Currently  normotensive  Continue to hold ACE inhibitor and diuretic    Hyperlipidemia   Continue statin    Type 2 diabetes mellitus   Hgb A1c <5%  Stop OHA's    Chronic pain with pain pump due to lower extremity neuropathy   He does not have the pump controller with him  Continue pregabalin   Continue intravenous hydromorphone as needed    Bipolar disorder   OCD  Stable, continue home medications     Consultations This Hospital Stay   SOCIAL WORK IP CONSULT  CARE COORDINATOR IP CONSULT  NEPHROLOGY IP CONSULT    Code Status   Full Code    Time Spent on this Encounter   I, Dru De Guzman, personally saw the patient today and spent less than or equal to 30 minutes discharging this patient.       Dru De Guzman MD  [facility]  ______________________________________________________________________    Physical Exam   Vital Signs: Temp: 97.3  F (36.3  C) Temp src: Oral BP: 104/61 Pulse: 74 Heart Rate: 80 Resp: 18 SpO2: 95 % O2 Device: None (Room air)    Weight: 250 lbs 3.2 oz  See 5/11 progress note       Primary Care Physician   Vince Henry    Discharge Orders      Reason for your hospital stay    Kidney problems- probably from dehydration     Follow-up and recommended labs and tests     BMP on 5/13; follow up with primary care provider, Vince Henry, within 7 days for hospital follow- up.  The following labs/tests are recommended: CBC and BMP.     Activity    Your activity upon discharge: activity as tolerated     Diet    Follow this diet upon discharge:       Regular Diet Adult       Significant Results and Procedures   Most Recent 3 BMP's:  Recent Labs   Lab Test 05/11/19  1607 05/11/19  0559 05/10/19  1616    142 140   POTASSIUM 5.1 5.0 5.1   CHLORIDE 111* 111* 108   CO2 29 24 24   BUN 29 40* 52*   CR 1.28* 1.46* 2.18*   ANIONGAP 4 7 8   LUIS 8.5 8.1* 8.5   * 87 116*   ,   Results for orders placed or performed during the hospital encounter of 05/10/19   US Renal Complete     Narrative    US RENAL COMPLETE 5/11/2019 2:33 PM     HISTORY: Acute renal failure. Difficulty voiding. Assess for  obstruction.     FINDINGS: Both kidneys appear unremarkable without hydronephrosis. The  bladder is incompletely distended.      Impression    IMPRESSION: Negative for hydronephrosis.    JAXSON TIRADO MD       Discharge Medications   Current Discharge Medication List      CONTINUE these medications which have NOT CHANGED    Details   aspirin (ASPIRIN LOW DOSE) 81 MG tablet Take 1 tablet (81 mg) by mouth daily  Qty: 30 tablet, Refills: 3    Associated Diagnoses: Hyperlipidemia LDL goal <100; Hypertension goal BP (blood pressure) < 140/90      atorvastatin (LIPITOR) 10 MG tablet Take 1 tablet (10 mg) by mouth daily  Qty: 90 tablet, Refills: 3    Associated Diagnoses: Hyperlipidemia LDL goal <100      divalproex (DEPAKOTE) 500 MG EC tablet Take 2 tablets (1,000 mg) by mouth daily  Qty: 10 tablet, Refills: 0    Associated Diagnoses: Hyperlipidemia LDL goal <100      doxepin (SINEQUAN) 25 MG capsule Take 50 mg by mouth At Bedtime       lurasidone (LATUDA) 40 MG TABS tablet Take 40 mg by mouth At Bedtime      modafinil (PROVIGIL) 200 MG tablet Take 200 mg by mouth every evening .  Takes prior to starting evening work shift      NONFORMULARY by Intrathecal route continuous - + up to 3 boluses daily    Implanted pump infusing Fentanyl, Morphine and Bupivacaine    Managed by Dr Pawan Shaw, [facility]      pregabalin (LYRICA) 100 MG capsule Take 100 mg by mouth 4 times daily      blood glucose (ACCU-CHEK COMPACT DRUM) test strip Use to test blood sugars 3 to 4 times daily.  Qty: 100 strip, Refills: 6    Associated Diagnoses: Type 2 diabetes mellitus without complication, without long-term current use of insulin (H)      blood glucose (ACCU-CHEK MULTICLIX) lancing device Lancing device to be used with lancets.  Qty: 1 each, Refills: 0    Comments: Okay to subsitute.  Associated Diagnoses: Type 2 diabetes  mellitus without complication, without long-term current use of insulin (H)      blood glucose monitoring (ACCU-CHEK COMPACT CARE KIT) meter device kit Use to test blood sugars 3 to 4  times daily.  Qty: 1 kit, Refills: 0    Associated Diagnoses: Type 2 diabetes mellitus without complication, without long-term current use of insulin (H)         STOP taking these medications       glipiZIDE (GLUCOTROL XL) 2.5 MG 24 hr tablet Comments:   Reason for Stopping:         hydrochlorothiazide (HYDRODIURIL) 12.5 MG tablet Comments:   Reason for Stopping:         lisinopril (PRINIVIL/ZESTRIL) 20 MG tablet Comments:   Reason for Stopping:         metFORMIN (GLUCOPHAGE-XR) 500 MG 24 hr tablet Comments:   Reason for Stopping:             Allergies   Allergies   Allergen Reactions     Cephalexin Diarrhea     Liraglutide Other (See Comments)     Sulfa Drugs Swelling     Pt has taken  Taken sulfa drugs orally without trouble. He had problems with Sulfa eye drops. Eye swelled up     Victoza      Increased migraine frequency and severity

## 2019-05-11 NOTE — PROGRESS NOTES
.RECEIVING UNIT ED HANDOFF REVIEW    ED Nurse Handoff Report was reviewed by: Raphael Robertson on May 10, 2019 at 7:34 PM

## 2019-05-11 NOTE — PLAN OF CARE
A/OX4. BP occasionally soft, other VSS on RA. Omar reg diet well, IVF infusing.BG check, no insulin given. Pain pump in place, however pt forgot the control in his car. Pain managed w/ marcelo Lyrica and IV Dilaudid. Writer encouraged pt to take PO Norco, however pt wanted IV Dialudid saying that he is not discharging tonight. Adequate UOP.SBA. Seen by Nephrologist. US of kidneys and bladder done, refer to results.

## 2019-05-11 NOTE — PROGRESS NOTES
Observation goals PRIOR TO DISCHARGE    Comments:     -Diagnostic tests and consults completed and resulted : Not met    -Vital signs normal or at patient baseline : Met    -Safe disposition plan has been identified : Not met, SW/CC consulted.

## 2019-05-11 NOTE — PLAN OF CARE
PRIMARY DIAGNOSIS: ACUTE RENAL COLIC    OUTPATIENT/OBSERVATION GOALS TO BE MET BEFORE DISCHARGE  1. Pain Status: Improved but still requiring IV narcotics.    2. Tolerating adequate PO diet: Yes    3. Surgical Intervention planned: No    4. Cleared by consultants (if involved): Yes    5. Return to near baseline physical activity: Yes    Discharge Planner Nurse   Safe discharge environment identified: Yes  Barriers to discharge: Yes, creat recheck       Entered by: Gloria Ozuna 05/11/2019 4:35 PM     Please review provider order for any additional goals.   Nurse to notify provider when observation goals have been met and patient is ready for discharge.

## 2019-05-11 NOTE — PROGRESS NOTES
Observation goals PRIOR TO DISCHARGE    Comments:   -diagnostic tests and consults completed and resulted : Not met, Nephrology consulted.     -vital signs normal or at patient baseline: Partially met, BP soft(101/75)    -safe disposition plan has been identified: Not met, SW consulted.     Nurse to notify provider when observation goals have been met and patient is ready for discharge.

## 2019-05-11 NOTE — PHARMACY-ADMISSION MEDICATION HISTORY
Admission Medication History    Admission medication history interview status for the 5/10/2019 admission is complete. See EPIC admission navigator for prior to admission medications     Medication history source reliability:Good    Actions taken by pharmacist (provider contacted, etc):None     Additional medication history information not noted on PTA med list :  >>> Patient receives many of his meds as free samples or directly from .  Medication costs are a significant impediment to self care.     Medication reconciliation/reorder completed by provider prior to medication history? No    Time spent in this activity: 20 minutes    Prior to Admission medications    Medication Sig Last Dose Taking? Auth Provider   aspirin (ASPIRIN LOW DOSE) 81 MG tablet Take 1 tablet (81 mg) by mouth daily 5/10/2019 at 0600 Yes Justyn Katz MD   atorvastatin (LIPITOR) 10 MG tablet Take 1 tablet (10 mg) by mouth daily 5/10/2019 at 0600 Yes Maribeth Ardon MD   divalproex (DEPAKOTE) 500 MG EC tablet Take 2 tablets (1,000 mg) by mouth daily 5/10/2019 at 0600 Yes Spike Tobin MD   doxepin (SINEQUAN) 25 MG capsule Take 50 mg by mouth At Bedtime  5/10/2019 at 0600 Yes Reported, Patient   glipiZIDE (GLUCOTROL XL) 2.5 MG 24 hr tablet Take 1 tablet (2.5 mg) by mouth daily 5/10/2019 at 1400 Yes Vince Henry MD   hydrochlorothiazide (HYDRODIURIL) 12.5 MG tablet Take 12.5 mg by mouth daily .  >>> Adds additional 12.5 mg as needed for lower extremity swelling 5/10/2019 at 1400 Yes Unknown, Entered By History   lisinopril (PRINIVIL/ZESTRIL) 20 MG tablet Take 1 tablet (20 mg) by mouth daily 5/10/2019 at Unknown time Yes Maribeth Ardon MD   lurasidone (LATUDA) 40 MG TABS tablet Take 40 mg by mouth At Bedtime 5/10/2019 at 0600 Yes Unknown, Entered By History   metFORMIN (GLUCOPHAGE-XR) 500 MG 24 hr tablet Take 1,000 mg by mouth 2 times daily (with meals) 5/10/2019 at Unknown time Yes Unknown, Entered By  History   modafinil (PROVIGIL) 200 MG tablet Take 200 mg by mouth every evening .  Takes prior to starting evening work shift 5/9/2019 at 1800 Yes Reported, Patient   NONFORMULARY by Intrathecal route continuous - + up to 3 boluses daily    Implanted pump infusing Fentanyl, Morphine and Bupivacaine    Managed by Dr Pawan Shaw, [facility]  Yes Unknown, Entered By History   pregabalin (LYRICA) 100 MG capsule Take 100 mg by mouth 4 times daily 5/10/2019 at Unknown time Yes Unknown, Entered By History   blood glucose (ACCU-CHEK COMPACT DRUM) test strip Use to test blood sugars 3 to 4 times daily.   Vince Henry MD   blood glucose (ACCU-CHEK MULTICLIX) lancing device Lancing device to be used with lancets.   Vince Henry MD   blood glucose monitoring (ACCU-CHEK COMPACT CARE KIT) meter device kit Use to test blood sugars 3 to 4  times daily.   Vince Henry MD David S. Cline, PharmD

## 2019-05-11 NOTE — PROGRESS NOTES
Observation goals PRIOR TO DISCHARGE    Comments:   -diagnostic tests and consults completed and resulted : Not met, Nephrology consulted.      -vital signs normal or at patient baseline: Met     -safe disposition plan has been identified: Not met, SW /CC consulted.      Nurse to notify provider when observation goals have been met and patient is ready for discharge.

## 2019-05-11 NOTE — PLAN OF CARE
Pt arrived from ED at 2010hrs. A/OX4. VSS on RA. Omar reg diet well. BG 65, OJ and apple juice given, recheck 70 and 77. Pain managed w/ marcelo Lyrica and IV Dilaudid. Voiding per urinal, low urine output. IVF infusing, encourage fluids. SBA.

## 2019-05-13 ENCOUNTER — ONCOLOGY VISIT (OUTPATIENT)
Dept: ONCOLOGY | Facility: CLINIC | Age: 64
End: 2019-05-13
Attending: NURSE PRACTITIONER
Payer: COMMERCIAL

## 2019-05-13 ENCOUNTER — INFUSION THERAPY VISIT (OUTPATIENT)
Dept: INFUSION THERAPY | Facility: CLINIC | Age: 64
End: 2019-05-13
Attending: INTERNAL MEDICINE
Payer: COMMERCIAL

## 2019-05-13 ENCOUNTER — HOSPITAL ENCOUNTER (OUTPATIENT)
Facility: CLINIC | Age: 64
Setting detail: SPECIMEN
Discharge: HOME | End: 2019-05-13
Attending: INTERNAL MEDICINE | Admitting: NURSE PRACTITIONER
Payer: COMMERCIAL

## 2019-05-13 ENCOUNTER — TELEPHONE (OUTPATIENT)
Dept: FAMILY MEDICINE | Facility: CLINIC | Age: 64
End: 2019-05-13

## 2019-05-13 ENCOUNTER — OFFICE VISIT (OUTPATIENT)
Dept: FAMILY MEDICINE | Facility: CLINIC | Age: 64
End: 2019-05-13
Payer: COMMERCIAL

## 2019-05-13 VITALS
BODY MASS INDEX: 40.38 KG/M2 | HEART RATE: 71 BPM | DIASTOLIC BLOOD PRESSURE: 66 MMHG | TEMPERATURE: 97.8 F | WEIGHT: 257.31 LBS | RESPIRATION RATE: 16 BRPM | OXYGEN SATURATION: 93 % | SYSTOLIC BLOOD PRESSURE: 92 MMHG | HEIGHT: 67 IN

## 2019-05-13 VITALS
HEART RATE: 77 BPM | HEIGHT: 67 IN | OXYGEN SATURATION: 96 % | TEMPERATURE: 97.7 F | WEIGHT: 249 LBS | DIASTOLIC BLOOD PRESSURE: 70 MMHG | BODY MASS INDEX: 39.08 KG/M2 | SYSTOLIC BLOOD PRESSURE: 108 MMHG

## 2019-05-13 DIAGNOSIS — N17.9 AKI (ACUTE KIDNEY INJURY) (H): Primary | ICD-10-CM

## 2019-05-13 DIAGNOSIS — E85.81 LIGHT CHAIN (AL) AMYLOIDOSIS (H): ICD-10-CM

## 2019-05-13 DIAGNOSIS — I10 HYPERTENSION GOAL BP (BLOOD PRESSURE) < 140/90: ICD-10-CM

## 2019-05-13 DIAGNOSIS — E11.9 TYPE 2 DIABETES MELLITUS WITHOUT COMPLICATION, WITHOUT LONG-TERM CURRENT USE OF INSULIN (H): ICD-10-CM

## 2019-05-13 DIAGNOSIS — Z11.4 SCREENING FOR HIV (HUMAN IMMUNODEFICIENCY VIRUS): ICD-10-CM

## 2019-05-13 DIAGNOSIS — G63 POLYNEUROPATHY ASSOCIATED WITH UNDERLYING DISEASE (H): Primary | ICD-10-CM

## 2019-05-13 DIAGNOSIS — E85.81 LIGHT CHAIN (AL) AMYLOIDOSIS (H): Primary | ICD-10-CM

## 2019-05-13 LAB
ALBUMIN SERPL-MCNC: 3.6 G/DL (ref 3.4–5)
ALP SERPL-CCNC: 95 U/L (ref 40–150)
ALT SERPL W P-5'-P-CCNC: 32 U/L (ref 0–70)
ANION GAP SERPL CALCULATED.3IONS-SCNC: 6 MMOL/L (ref 3–14)
AST SERPL W P-5'-P-CCNC: 17 U/L (ref 0–45)
BASOPHILS # BLD AUTO: 0.1 10E9/L (ref 0–0.2)
BASOPHILS NFR BLD AUTO: 1.4 %
BILIRUB SERPL-MCNC: 0.5 MG/DL (ref 0.2–1.3)
BUN SERPL-MCNC: 29 MG/DL (ref 7–30)
CALCIUM SERPL-MCNC: 8.7 MG/DL (ref 8.5–10.1)
CHLORIDE SERPL-SCNC: 108 MMOL/L (ref 94–109)
CO2 SERPL-SCNC: 28 MMOL/L (ref 20–32)
CREAT SERPL-MCNC: 1.32 MG/DL (ref 0.66–1.25)
DIFFERENTIAL METHOD BLD: ABNORMAL
EOSINOPHIL # BLD AUTO: 0.3 10E9/L (ref 0–0.7)
EOSINOPHIL NFR BLD AUTO: 7.7 %
ERYTHROCYTE [DISTWIDTH] IN BLOOD BY AUTOMATED COUNT: 15.1 % (ref 10–15)
ERYTHROCYTE [DISTWIDTH] IN BLOOD BY AUTOMATED COUNT: 15.1 % (ref 10–15)
GFR SERPL CREATININE-BSD FRML MDRD: 57 ML/MIN/{1.73_M2}
GLUCOSE SERPL-MCNC: 124 MG/DL (ref 70–99)
HCT VFR BLD AUTO: 31.1 % (ref 40–53)
HCT VFR BLD AUTO: 31.2 % (ref 40–53)
HGB BLD-MCNC: 10.6 G/DL (ref 13.3–17.7)
HGB BLD-MCNC: 10.8 G/DL (ref 13.3–17.7)
IMM GRANULOCYTES # BLD: 0 10E9/L (ref 0–0.4)
IMM GRANULOCYTES NFR BLD: 0.3 %
LYMPHOCYTES # BLD AUTO: 0.9 10E9/L (ref 0.8–5.3)
LYMPHOCYTES NFR BLD AUTO: 25.8 %
MCH RBC QN AUTO: 33.6 PG (ref 26.5–33)
MCH RBC QN AUTO: 33.9 PG (ref 26.5–33)
MCHC RBC AUTO-ENTMCNC: 34 G/DL (ref 31.5–36.5)
MCHC RBC AUTO-ENTMCNC: 34.7 G/DL (ref 31.5–36.5)
MCV RBC AUTO: 100 FL (ref 78–100)
MCV RBC AUTO: 97 FL (ref 78–100)
MONOCYTES # BLD AUTO: 0.3 10E9/L (ref 0–1.3)
MONOCYTES NFR BLD AUTO: 8.2 %
NEUTROPHILS # BLD AUTO: 2.1 10E9/L (ref 1.6–8.3)
NEUTROPHILS NFR BLD AUTO: 56.6 %
NRBC # BLD AUTO: 0 10*3/UL
NRBC BLD AUTO-RTO: 0 /100
PLATELET # BLD AUTO: 110 10E9/L (ref 150–450)
PLATELET # BLD AUTO: 122 10E9/L (ref 150–450)
POTASSIUM SERPL-SCNC: 4.6 MMOL/L (ref 3.4–5.3)
PROT SERPL-MCNC: 6.6 G/DL (ref 6.8–8.8)
RBC # BLD AUTO: 3.13 10E12/L (ref 4.4–5.9)
RBC # BLD AUTO: 3.21 10E12/L (ref 4.4–5.9)
SODIUM SERPL-SCNC: 142 MMOL/L (ref 133–144)
WBC # BLD AUTO: 3.6 10E9/L (ref 4–11)
WBC # BLD AUTO: 3.7 10E9/L (ref 4–11)

## 2019-05-13 PROCEDURE — 36415 COLL VENOUS BLD VENIPUNCTURE: CPT

## 2019-05-13 PROCEDURE — 87389 HIV-1 AG W/HIV-1&-2 AB AG IA: CPT | Performed by: FAMILY MEDICINE

## 2019-05-13 PROCEDURE — 80048 BASIC METABOLIC PNL TOTAL CA: CPT | Performed by: FAMILY MEDICINE

## 2019-05-13 PROCEDURE — 85025 COMPLETE CBC W/AUTO DIFF WBC: CPT | Performed by: NURSE PRACTITIONER

## 2019-05-13 PROCEDURE — 80061 LIPID PANEL: CPT | Performed by: FAMILY MEDICINE

## 2019-05-13 PROCEDURE — 99214 OFFICE O/P EST MOD 30 MIN: CPT | Mod: 25 | Performed by: FAMILY MEDICINE

## 2019-05-13 PROCEDURE — 99214 OFFICE O/P EST MOD 30 MIN: CPT | Performed by: NURSE PRACTITIONER

## 2019-05-13 PROCEDURE — 85027 COMPLETE CBC AUTOMATED: CPT | Performed by: FAMILY MEDICINE

## 2019-05-13 PROCEDURE — G0463 HOSPITAL OUTPT CLINIC VISIT: HCPCS

## 2019-05-13 PROCEDURE — 80053 COMPREHEN METABOLIC PANEL: CPT | Performed by: NURSE PRACTITIONER

## 2019-05-13 PROCEDURE — 82043 UR ALBUMIN QUANTITATIVE: CPT | Performed by: FAMILY MEDICINE

## 2019-05-13 RX ORDER — METFORMIN HCL 500 MG
1000 TABLET, EXTENDED RELEASE 24 HR ORAL
Qty: 60 TABLET | Refills: 0 | Status: SHIPPED | OUTPATIENT
Start: 2019-05-13 | End: 2019-07-02

## 2019-05-13 RX ORDER — HYDROCHLOROTHIAZIDE 12.5 MG/1
12.5 TABLET ORAL DAILY
Qty: 30 TABLET | Refills: 0 | Status: SHIPPED | OUTPATIENT
Start: 2019-05-13 | End: 2019-05-16

## 2019-05-13 RX ORDER — LISINOPRIL 20 MG/1
20 TABLET ORAL DAILY
Qty: 90 TABLET | Refills: 3 | Status: SHIPPED | OUTPATIENT
Start: 2019-05-13 | End: 2019-05-16

## 2019-05-13 ASSESSMENT — PAIN SCALES - GENERAL
PAINLEVEL: NO PAIN (0)
PAINLEVEL: NO PAIN (0)

## 2019-05-13 ASSESSMENT — MIFFLIN-ST. JEOR
SCORE: 1925.79
SCORE: 1888.09

## 2019-05-13 NOTE — TELEPHONE ENCOUNTER
ED/Discharge Protocol    Spoke with patient   He states he has appt with FS today   States he will discuss hospitalization then  Stacie MAYA, RN    Discharge Orders             Reason for your hospital stay     Kidney problems- probably from dehydration          Follow-up and recommended labs and tests      BMP on 5/13; follow up with primary care provider, Vince Henry, within 7 days for hospital follow- up.  The following labs/tests are recommended: CBC and BMP.          Activity     Your activity upon discharge: activity as tolerated          Diet     Follow this diet upon discharge:       Regular Diet Adult        Next 5 appointments (look out 90 days)    May 13, 2019 11:20 AM CDT  MyChart Long with Vince Henry MD  [facility] ([facility]) [address] [place]  [facility] [address] [phone]   May 16, 2019  4:00 PM CDT  Return Visit with Mary Castro MD  [facility] ([facility]) [facility]  [address] [facility] [address]  [facility] [address] [phone]

## 2019-05-13 NOTE — TELEPHONE ENCOUNTER
Hospital F/U 5/11/2019 DX:Acute Kidney Injury (H ED/IP 0/0    [phone] (home) [phone] (work)

## 2019-05-13 NOTE — PROGRESS NOTES
Medical Assistant Note:  Parmjit Hernández presents today for blood draw.    Patient seen by provider today: Yes: ina  .   present during visit today: Not Applicable.    Concerns: No Concerns.    Procedure:  Lab draw site: Left hand, Needle type: bf, Gauge: 23.    Post Assessment:  Labs drawn without difficulty: Yes.    Discharge Plan:  Departure Mode: Ambulatory.    Face to Face Time: 5 min.    Shaneka Vargas

## 2019-05-13 NOTE — PROGRESS NOTES
Oncology/Hematology Visit Note  May 13, 2019    Reason for Visit: follow up of   AL kappa amyloidosis subtype in the lymph node and bone marrow.  Patient of Dr. Castro's  Currently not on treatment- he is on observation for amyloidosis   05/11-discharged from the hospital with acute kidney injury  .    Interval History:  Overall he feels okay.  He denies urinary symptoms denies blood in urine.  Denies abdominal pain.  Denies fever chills sweats cough shortness of breath chest pain.  He did not drink enough of fluids last  because he worked as an [employer]  the whole night.      Review of Systems:  14 point ROS of systems including Constitutional, Eyes, Respiratory, Cardiovascular, Gastroenterology, Genitourinary, Integumentary, Muscularskeletal, Psychiatric were all negative except for pertinent positives noted in my HPI.      Current Outpatient Medications   Medication Sig Dispense Refill     aspirin (ASPIRIN LOW DOSE) 81 MG tablet Take 1 tablet (81 mg) by mouth daily 30 tablet 3     atorvastatin (LIPITOR) 10 MG tablet Take 1 tablet (10 mg) by mouth daily 90 tablet 3     blood glucose (ACCU-CHEK COMPACT DRUM) test strip Use to test blood sugars 3 to 4 times daily. 100 strip 6     blood glucose (ACCU-CHEK MULTICLIX) lancing device Lancing device to be used with lancets. 1 each 0     blood glucose monitoring (ACCU-CHEK COMPACT CARE KIT) meter device kit Use to test blood sugars 3 to 4  times daily. 1 kit 0     divalproex (DEPAKOTE) 500 MG EC tablet Take 2 tablets (1,000 mg) by mouth daily 10 tablet 0     doxepin (SINEQUAN) 25 MG capsule Take 50 mg by mouth At Bedtime        lurasidone (LATUDA) 40 MG TABS tablet Take 40 mg by mouth At Bedtime       modafinil (PROVIGIL) 200 MG tablet Take 200 mg by mouth every evening .  Takes prior to starting evening work shift       NONFORMULARY by Intrathecal route continuous - + up to 3 boluses daily    Implanted pump infusing Fentanyl, Morphine and Bupivacaine    Managed by   "[facility]       pregabalin (LYRICA) 100 MG capsule Take 100 mg by mouth 4 times daily         Physical Examination:  General: The patient is a pleasant male in no acute distress.  /70   Pulse 77   Temp 97.7  F (36.5  C) (Oral)   Ht 1.702 m (5' 7\")   Wt 112.9 kg (249 lb)   SpO2 96%   BMI 39.00 kg/m    HEENT: EOMI, PERRL. Sclerae are anicteric. Oral mucosa is pink and moist with no lesions or thrush.   Lymph: Neck is supple with no lymphadenopathy in the cervical or supraclavicular areas.   Heart: Regular rate and rhythm.   Lungs: Clear to auscultation bilaterally.   GI: Bowel sounds present, soft, nontender with no palpable hepatosplenomegaly or masses.   Extremities: No lower extremity edema noted bilaterally.   Skin: No rashes, petechiae, or bruising noted on exposed skin.    Laboratory Data:  No results found for this or any previous visit (from the past 24 hour(s)).      Assessment and Plan:    This is a 64 y/o male with       AL kappa amyloidosis subtype in the lymph node and bone marrow.  Patient of Dr. Castro's  Currently not on treatment- he is on observation for amyloidosis   -He has follow-up appointment with Dr. Casrto this week      KWABENA   He was in the hopsital 05/  Good response to fluids-likely prerenal  05/10 Kidney ultrasound -negative for hydronephrosis  He did not drink water today or last night because he worked  -proper hydration discussed. Increase fluid intake   ACE inhibitor, glipizide /metformin  and diuretics on hold .He seen his PCP today        JUNIOR Morin CNP  Hem/Onc   [facility]     Chart documentation with Dragon Voice recognition Software. Although reviewed after completion, some words and grammatical errors may remain.          "

## 2019-05-13 NOTE — PROGRESS NOTES
SUBJECTIVE:   Parmjit Hernández is a 63 year old male who presents to clinic today for the following   health issues:    Hospital Follow-up Visit:    Hospital/Nursing Home/IP Rehab Facility: [facility]  Date of Admission: 05/10/2019  Date of Discharge: 05/11/2019  Reason(s) for Admission: LABS             Problems taking medications regularly:  None       Medication changes since discharge: Yes changes were made, patient would like to have PCP review med change       Problems adhering to non-medication therapy:  None    Summary of hospitalization:  [facility] discharge summary reviewed  Diagnostic Tests/Treatments reviewed.  Follow up needed: CBC and BMP  Other Healthcare Providers Involved in Patient s Care:         None  Update since discharge: improved.   Post Discharge Medication Reconciliation: discharge medications reconciled, continue medications without change.  Plan of care communicated with patient          Additional history: as documented    Reviewed  and updated as needed this visit by clinical staff  Allergies         Reviewed and updated as needed this visit by Provider         Patient Active Problem List   Diagnosis     Obstructive sleep apnea     HYPERLIPIDEMIA LDL GOAL <100     Hypertension goal BP (blood pressure) < 140/90     Advanced directives, counseling/discussion     Migraine     History of diverticulitis     MENTAL HEALTH     Marianne (H)     Bipolar I disorder (H)     Chronic abdominal pain     Anxiety     Other amyloidosis (H)     Insomnia due to medical condition     Narcotic dependence (H)     Obstructive sleep apnea syndrome     Type 2 diabetes mellitus without complication, without long-term current use of insulin (H)     Restless legs syndrome (RLS)     Type 2 diabetes mellitus with other specified complication (H)     Peripheral edema     Morbid obesity (H)     Stasis dermatitis of both legs     Polyneuropathy associated with underlying disease (H)      Acquired lymphedema     Renal failure     Low blood pressure reading     Past Surgical History:   Procedure Laterality Date     BMT PROTOCOL      for systemic amyloidosis     BONE MARROW BIOPSY, BONE SPECIMEN, NEEDLE/TROCAR N/A 6/8/2016    Procedure: BIOPSY BONE MARROW;  Surgeon: Nathan Agrawal MD;  Location:  GI     BONE MARROW BIOPSY, BONE SPECIMEN, NEEDLE/TROCAR N/A 2/20/2019    Procedure: BIOPSY BONE MARROW;  Surgeon: Michael Raygoza MD;  Location:  GI     C NONSPECIFIC PROCEDURE  94    Cholecystectomy     C NONSPECIFIC PROCEDURE  2000    repair deviated septum     CHOLECYSTECTOMY  1995    lap qian     COLONOSCOPY  2012    hx polyps     ESOPHAGOSCOPY, GASTROSCOPY, DUODENOSCOPY (EGD), COMBINED N/A 5/29/2015    Procedure: COMBINED ESOPHAGOSCOPY, GASTROSCOPY, DUODENOSCOPY (EGD), BIOPSY SINGLE OR MULTIPLE;  Surgeon: John Jacob MD;  Location:  GI     HERNIA REPAIR, UMBILICAL  2006       Social History     Tobacco Use     Smoking status: Never Smoker     Smokeless tobacco: Never Used   Substance Use Topics     Alcohol use: No     Alcohol/week: 0.0 oz     Family History   Problem Relation Age of Onset     Cerebrovascular Disease Mother      C.A.D. Mother      Hypertension Mother      Alcohol/Drug Mother      Arthritis Mother      Cerebrovascular Disease Maternal Grandmother      C.A.D. Maternal Grandmother      Alcohol/Drug Maternal Grandmother      C.A.D. Father      Heart Disease Father      Hypertension Father      Alcohol/Drug Father      Allergies Father      Circulatory Father      Depression Father      Respiratory Father      Asthma Father      Alcohol/Drug Paternal Grandfather      Cerebrovascular Disease Paternal Grandfather      Depression Son      Psychotic Disorder Son         anxiety disorder     Depression Daughter      Cerebrovascular Disease Maternal Grandfather      Cerebrovascular Disease Paternal Grandmother      Diabetes Brother      Allergies Sister       "Depression Sister      Gynecology Sister      Coronary Artery Disease No family hx of      Hyperlipidemia No family hx of      Breast Cancer No family hx of      Colon Cancer No family hx of      Prostate Cancer No family hx of      Other Cancer No family hx of      Anxiety Disorder No family hx of      Mental Illness No family hx of      Substance Abuse No family hx of      Anesthesia Reaction No family hx of      Osteoporosis No family hx of      Genetic Disorder No family hx of      Thyroid Disease No family hx of      Obesity No family hx of      Unknown/Adopted No family hx of            ROS:  CONSTITUTIONAL: NEGATIVE for fever, chills, change in weight  ENT/MOUTH: NEGATIVE for ear, mouth and throat problems  RESP: NEGATIVE for significant cough or SOB  CV: NEGATIVE for chest pain, palpitations or peripheral edema    OBJECTIVE:                                                    BP 92/66 (BP Location: Left arm, Patient Position: Sitting, Cuff Size: Adult Large)   Pulse 71   Temp 97.8  F (36.6  C) (Oral)   Resp 16   Ht 1.702 m (5' 7\")   Wt 116.7 kg (257 lb 5 oz)   SpO2 93%   BMI 40.30 kg/m    Body mass index is 40.3 kg/m .  GENERAL: healthy, alert, well nourished, well hydrated, no distress  RESP: lungs clear to auscultation - no rales, no rhonchi, no wheezes  CV: regular rates and rhythm, normal S1 S2, no S3 or S4 and no murmur, no click or rub -  ABDOMEN: soft, no tenderness, no  hepatosplenomegaly, no masses, normal bowel sounds    Diagnostic test results:  Diagnostic Test Results:  Labs reviewed in Epic  Results for orders placed or performed in visit on 05/13/19   CBC with platelets   Result Value Ref Range    WBC 3.7 (L) 4.0 - 11.0 10e9/L    RBC Count 3.13 (L) 4.4 - 5.9 10e12/L    Hemoglobin 10.6 (L) 13.3 - 17.7 g/dL    Hematocrit 31.2 (L) 40.0 - 53.0 %     78 - 100 fl    MCH 33.9 (H) 26.5 - 33.0 pg    MCHC 34.0 31.5 - 36.5 g/dL    RDW 15.1 (H) 10.0 - 15.0 %    Platelet Count 110 (L) 150 - 450 " 10e9/L   Basic metabolic panel   Result Value Ref Range    Sodium 139 133 - 144 mmol/L    Potassium 4.8 3.4 - 5.3 mmol/L    Chloride 110 (H) 94 - 109 mmol/L    Carbon Dioxide 24 20 - 32 mmol/L    Anion Gap 6 3 - 14 mmol/L    Glucose 115 (H) 70 - 99 mg/dL    Urea Nitrogen 29 7 - 30 mg/dL    Creatinine 1.26 (H) 0.66 - 1.25 mg/dL    GFR Estimate 60 (L) >60 mL/min/[1.73_m2]    GFR Estimate If Black 70 >60 mL/min/[1.73_m2]    Calcium 8.6 8.5 - 10.1 mg/dL   HIV Antigen Antibody Combo   Result Value Ref Range    HIV Antigen Antibody Combo Nonreactive NR^Nonreactive       Lipid panel reflex to direct LDL Non-fasting   Result Value Ref Range    Cholesterol 119 <200 mg/dL    Triglycerides 269 (H) <150 mg/dL    HDL Cholesterol 26 (L) >39 mg/dL    LDL Cholesterol Calculated 39 <100 mg/dL    Non HDL Cholesterol 93 <130 mg/dL   Albumin Random Urine Quantitative with Creat Ratio   Result Value Ref Range    Creatinine Urine 90 mg/dL    Albumin Urine mg/L <5 mg/L    Albumin Urine mg/g Cr Unable to calculate due to low value 0 - 17 mg/g Cr        ASSESSMENT/PLAN:                                                        ICD-10-CM    1. KWABENA (acute kidney injury) (H) N17.9 CBC with platelets     Basic metabolic panel     metFORMIN (GLUCOPHAGE-XR) 500 MG 24 hr tablet     DISCONTINUED: lisinopril (PRINIVIL/ZESTRIL) 20 MG tablet     DISCONTINUED: hydrochlorothiazide (HYDRODIURIL) 12.5 MG tablet   2. Hypertension goal BP (blood pressure) < 140/90 I10 DISCONTINUED: lisinopril (PRINIVIL/ZESTRIL) 20 MG tablet     DISCONTINUED: hydrochlorothiazide (HYDRODIURIL) 12.5 MG tablet   3. Screening for HIV (human immunodeficiency virus) Z11.4 HIV Antigen Antibody Combo   4. Type 2 diabetes mellitus without complication, without long-term current use of insulin (H) E11.9 Lipid panel reflex to direct LDL Non-fasting     Albumin Random Urine Quantitative with Creat Ratio     Continue to hold both medications for now.   Routine follow-up with hematology.      Vince Henry MD  [facility]

## 2019-05-13 NOTE — NURSING NOTE
"Chief Complaint   Patient presents with     Hospital F/U     RECHECK     BP 92/66 (BP Location: Left arm, Patient Position: Sitting, Cuff Size: Adult Large)   Pulse 71   Temp 97.8  F (36.6  C) (Oral)   Resp 16   Ht 1.702 m (5' 7\")   Wt 116.7 kg (257 lb 5 oz)   SpO2 93%   BMI 40.30 kg/m   Estimated body mass index is 40.3 kg/m  as calculated from the following:    Height as of this encounter: 1.702 m (5' 7\").    Weight as of this encounter: 116.7 kg (257 lb 5 oz).  bp completed using cuff size: regular      Health Maintenance addressed:  NONE    n/a              "

## 2019-05-13 NOTE — LETTER
5/13/2019         RE: Parmjit Hernández  [address]  [facility] [address]        Dear Colleague,    Thank you for referring your patient, Parmjit Hernández, to the [facility]. Please see a copy of my visit note below.    Oncology/Hematology Visit Note  May 13, 2019    Reason for Visit: follow up of   AL kappa amyloidosis subtype in the lymph node and bone marrow.  Patient of Dr. Castro's  Currently not on treatment- he is on observation for amyloidosis   05/11-discharged from the hospital with acute kidney injury  .    Interval History:  Overall he feels okay.  He denies urinary symptoms denies blood in urine.  Denies abdominal pain.  Denies fever chills sweats cough shortness of breath chest pain.  He did not drink enough of fluids last  because he worked as an [employer]  the whole night.      Review of Systems:  14 point ROS of systems including Constitutional, Eyes, Respiratory, Cardiovascular, Gastroenterology, Genitourinary, Integumentary, Muscularskeletal, Psychiatric were all negative except for pertinent positives noted in my HPI.      Current Outpatient Medications   Medication Sig Dispense Refill     aspirin (ASPIRIN LOW DOSE) 81 MG tablet Take 1 tablet (81 mg) by mouth daily 30 tablet 3     atorvastatin (LIPITOR) 10 MG tablet Take 1 tablet (10 mg) by mouth daily 90 tablet 3     blood glucose (ACCU-CHEK COMPACT DRUM) test strip Use to test blood sugars 3 to 4 times daily. 100 strip 6     blood glucose (ACCU-CHEK MULTICLIX) lancing device Lancing device to be used with lancets. 1 each 0     blood glucose monitoring (ACCU-CHEK COMPACT CARE KIT) meter device kit Use to test blood sugars 3 to 4  times daily. 1 kit 0     divalproex (DEPAKOTE) 500 MG EC tablet Take 2 tablets (1,000 mg) by mouth daily 10 tablet 0     doxepin (SINEQUAN) 25 MG capsule Take 50 mg by mouth At Bedtime        lurasidone (LATUDA) 40 MG TABS tablet Take 40 mg by mouth At Bedtime       modafinil (PROVIGIL)  "200 MG tablet Take 200 mg by mouth every evening .  Takes prior to starting evening work shift       NONFORMULARY by Intrathecal route continuous - + up to 3 boluses daily    Implanted pump infusing Fentanyl, Morphine and Bupivacaine    Managed by [facility]       pregabalin (LYRICA) 100 MG capsule Take 100 mg by mouth 4 times daily         Physical Examination:  General: The patient is a pleasant male in no acute distress.  /70   Pulse 77   Temp 97.7  F (36.5  C) (Oral)   Ht 1.702 m (5' 7\")   Wt 112.9 kg (249 lb)   SpO2 96%   BMI 39.00 kg/m     HEENT: EOMI, PERRL. Sclerae are anicteric. Oral mucosa is pink and moist with no lesions or thrush.   Lymph: Neck is supple with no lymphadenopathy in the cervical or supraclavicular areas.   Heart: Regular rate and rhythm.   Lungs: Clear to auscultation bilaterally.   GI: Bowel sounds present, soft, nontender with no palpable hepatosplenomegaly or masses.   Extremities: No lower extremity edema noted bilaterally.   Skin: No rashes, petechiae, or bruising noted on exposed skin.    Laboratory Data:  No results found for this or any previous visit (from the past 24 hour(s)).      Assessment and Plan:    This is a 64 y/o male with       AL kappa amyloidosis subtype in the lymph node and bone marrow.  Patient of Dr. Castro's  Currently not on treatment- he is on observation for amyloidosis   -He has follow-up appointment with Dr. Castro this week      KWABENA   He was in the hopsital 05/  Good response to fluids-likely prerenal  05/10 Kidney ultrasound -negative for hydronephrosis  He did not drink water today or last night because he worked  -proper hydration discussed. Increase fluid intake   ACE inhibitor, glipizide /metformin  and diuretics on hold .He seen his PCP today        JUNIOR Morin CNP  Hem/Onc   [facility]     Chart documentation with Dragon Voice recognition Software. Although reviewed after " completion, some words and grammatical errors may remain.            Again, thank you for allowing me to participate in the care of your patient.        Sincerely,        JUNIOR Morin CNP

## 2019-05-14 LAB
ANION GAP SERPL CALCULATED.3IONS-SCNC: 6 MMOL/L (ref 3–14)
BUN SERPL-MCNC: 29 MG/DL (ref 7–30)
CALCIUM SERPL-MCNC: 8.6 MG/DL (ref 8.5–10.1)
CHLORIDE SERPL-SCNC: 110 MMOL/L (ref 94–109)
CHOLEST SERPL-MCNC: 119 MG/DL
CO2 SERPL-SCNC: 24 MMOL/L (ref 20–32)
CREAT SERPL-MCNC: 1.26 MG/DL (ref 0.66–1.25)
CREAT UR-MCNC: 90 MG/DL
GFR SERPL CREATININE-BSD FRML MDRD: 60 ML/MIN/{1.73_M2}
GLUCOSE SERPL-MCNC: 115 MG/DL (ref 70–99)
HDLC SERPL-MCNC: 26 MG/DL
HIV 1+2 AB+HIV1 P24 AG SERPL QL IA: NONREACTIVE
LDLC SERPL CALC-MCNC: 39 MG/DL
MICROALBUMIN UR-MCNC: <5 MG/L
MICROALBUMIN/CREAT UR: NORMAL MG/G CR (ref 0–17)
NONHDLC SERPL-MCNC: 93 MG/DL
POTASSIUM SERPL-SCNC: 4.8 MMOL/L (ref 3.4–5.3)
SODIUM SERPL-SCNC: 139 MMOL/L (ref 133–144)
TRIGL SERPL-MCNC: 269 MG/DL

## 2019-05-16 ENCOUNTER — HOSPITAL ENCOUNTER (OUTPATIENT)
Facility: CLINIC | Age: 64
Setting detail: SPECIMEN
End: 2019-05-16
Attending: INTERNAL MEDICINE
Payer: COMMERCIAL

## 2019-05-16 ENCOUNTER — ONCOLOGY VISIT (OUTPATIENT)
Dept: ONCOLOGY | Facility: CLINIC | Age: 64
End: 2019-05-16
Attending: INTERNAL MEDICINE
Payer: COMMERCIAL

## 2019-05-16 ENCOUNTER — INFUSION THERAPY VISIT (OUTPATIENT)
Dept: INFUSION THERAPY | Facility: CLINIC | Age: 64
End: 2019-05-16
Attending: INTERNAL MEDICINE
Payer: COMMERCIAL

## 2019-05-16 VITALS
WEIGHT: 258.9 LBS | HEIGHT: 67 IN | BODY MASS INDEX: 40.63 KG/M2 | OXYGEN SATURATION: 96 % | HEART RATE: 75 BPM | TEMPERATURE: 97.6 F | SYSTOLIC BLOOD PRESSURE: 72 MMHG | DIASTOLIC BLOOD PRESSURE: 50 MMHG

## 2019-05-16 VITALS — DIASTOLIC BLOOD PRESSURE: 69 MMHG | HEART RATE: 69 BPM | SYSTOLIC BLOOD PRESSURE: 105 MMHG | RESPIRATION RATE: 18 BRPM

## 2019-05-16 DIAGNOSIS — L98.9 SKIN LESION OF RIGHT LEG: Primary | ICD-10-CM

## 2019-05-16 DIAGNOSIS — E85.81 LIGHT CHAIN (AL) AMYLOIDOSIS (H): ICD-10-CM

## 2019-05-16 DIAGNOSIS — R03.1 LOW BLOOD PRESSURE READING: Primary | ICD-10-CM

## 2019-05-16 DIAGNOSIS — R03.1 LOW BLOOD PRESSURE READING: ICD-10-CM

## 2019-05-16 PROCEDURE — 25000128 H RX IP 250 OP 636: Performed by: INTERNAL MEDICINE

## 2019-05-16 PROCEDURE — G0463 HOSPITAL OUTPT CLINIC VISIT: HCPCS | Mod: 25

## 2019-05-16 PROCEDURE — 96360 HYDRATION IV INFUSION INIT: CPT

## 2019-05-16 PROCEDURE — 99214 OFFICE O/P EST MOD 30 MIN: CPT | Performed by: INTERNAL MEDICINE

## 2019-05-16 RX ADMIN — SODIUM CHLORIDE 500 ML: 9 INJECTION, SOLUTION INTRAVENOUS at 15:39

## 2019-05-16 ASSESSMENT — PAIN SCALES - GENERAL: PAINLEVEL: NO PAIN (0)

## 2019-05-16 ASSESSMENT — MIFFLIN-ST. JEOR: SCORE: 1932.99

## 2019-05-16 NOTE — PATIENT INSTRUCTIONS
1- Stop lisinopril and hydrochlorothiazide.   2- See Dr. Colvin at the end of July as scheduled  3- RTC MD 4 months with labs- CBC diff, CMP, SPEP, IFXN, light chains  4- Refer to dermatology   5- Normal saline 500 ml IV and oral fluids/ Today      In [facility] infusion     [phone]  Patient prefers Derm appt near his home in [place] if possible.  Patient was given phone number for [facility] scheduling and this  left a message with the derm clinic to return call for scheduling/Janice 5/21/19 Patient is scheduled with [facility] [place] on 6/12/19    Phone [phone]

## 2019-05-16 NOTE — PROGRESS NOTES
"[facility] PHYSICIANS  HEMATOLOGY ONCOLOGY     DIAGNOSES:     1. 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  In 06/2016, a 1.4 cm lytic lesion in the radial diaphysis and in calvarium.  Bone marrow biopsy 06/2016 normal cellular marrow, 50% cellularity, 5% plasma cells, 4% kappa monotypic plasma cells.  Staining positive for Congo red, normal cytogenetics, translocation 11;14, monosomal 13.  PET/CT scan 06/2016:  Lytic lesions were not identified.  Subcarinal lymph node was positive for amyloidosis treated with CyBorD starting from 08/2006, very good partial response with normalization of the light chain and mildly persistent elevation of the light chain ratio.  In 11/2016, proceeded with auto stem cell transplant and achieved complete hematological response.  All of this was accomplished at [facility] under the care of Dr. Ming Trinh.   2.  Chronic abdominal pain, unclear etiology.  Follows up with [facility].  In 12/2018, he had a pain pump placed.      TREATMENT:  Observation.      SUBJECTIVE:  The patient was seen as a followup today. He is fatigued. He was recently started back on his blood pressure medication.     REVIEW OF SYSTEMS:  A complete review of systems was performed and found to be negative other than pertinent positives mentioned in history of present illness.      Past medical, social histories reviewed.     Meds- Reviewed.     PHYSICAL EXAMINATION:   VITAL SIGNS: BP (!) 72/50   Pulse 75   Temp 97.6  F (36.4  C) (Oral)   Ht 1.702 m (5' 7\")   Wt 117.4 kg (258 lb 14.4 oz)   SpO2 96%   BMI 40.55 kg/m    CONSTITUTIONAL: Sitting comfortably.   HEENT: Pupils are equal. Oropharynx is clear.   NECK: No cervical or supraclavicular lymphadenopathy.   RESPIRATORY: Clear bilaterally.   CARD/VASC: S1, S2, regular.   GI: Soft, " nontender, nondistended, no hepatosplenomegaly.   MUSKULOSKELETAL: Warm, well perfused.   NEUROLOGIC: Alert, awake.   INTEGUMENT: No rash.   LYMPHATICS: No edema.   PSYCH: Appears anxious and tired.      LABORATORY DATA AND IMAGING REVIEWED DURING THIS VISIT:  Recent Labs   Lab Test 05/13/19  1153 05/13/19  0905    142   POTASSIUM 4.8 4.6   CHLORIDE 110* 108   CO2 24 28   ANIONGAP 6 6   BUN 29 29   CR 1.26* 1.32*   * 124*   LUIS 8.6 8.7     Recent Labs   Lab Test 05/13/19  1153 05/13/19  0905 05/11/19  0559 05/10/19  1616 05/09/19  1202   WBC 3.7* 3.6* 3.0* 3.7* 5.2   HGB 10.6* 10.8* 11.0* 10.5* 11.8*   * 122* 114* 117* 154    97 96 96 97   NEUTROPHIL  --  56.6  --  67.3 66.7     Recent Labs   Lab Test 05/13/19  0905 05/09/19  1202 02/13/19  1410  08/28/18  1259   BILITOTAL 0.5 0.5 0.3   < > 1.6*   ALKPHOS 95 84 107   < > 75   ALT 32 32 25   < > 28   AST 17 18 18   < > 15   ALBUMIN 3.6 3.8 3.2*   < > 4.0   LDH  --   --  161  --  465*    < > = values in this interval not displayed.   Results for ERIC PRESCOTT (MRN [id number]) as of 5/16/2019 15:19   Ref. Range 2/13/2019 14:10 2/15/2019 14:00 2/20/2019 09:19 2/20/2019 09:19 5/9/2019 12:02   Gamma Fraction Latest Ref Range: 0.7 - 1.6 g/dL 0.6 (L)    0.5 (L)   Haptoglobin Latest Ref Range: 35 - 175 mg/dL 61       IGA Latest Ref Range: 70 - 380 mg/dL 41 (L)    38 (L)   IGG Latest Ref Range: 695 - 1,620 mg/dL 577 (L)    515 (L)   IGM Latest Ref Range: 60 - 265 mg/dL 40 (L)    39 (L)   Immunofixation ELP Unknown No monoclonal pro...    No monoclonal pro...   Kappa Free Lt Chain Latest Ref Range: 0.33 - 1.94 mg/dL 1.59    1.18   Kappa Lambda Ratio Latest Ref Range: 0.26 - 1.65  1.06    0.43   Lambda Free Lt Chain Latest Ref Range: 0.57 - 2.63 mg/dL 1.50    2.72 (H)   LEUKEMIA LYMPHOMA EVALUATION (FLOW CYTOMETRY) Unknown   Rpt Rpt    Monoclonal Peak Latest Ref Range: 0.0 g/dL 0.0    0.0   PROCESS AND HOLD DNA Unknown   Rpt     Scleroderma Antibody  Scl-70 KEAN IgG Latest Ref Range: 0.0 - 0.9 AI  <0.2        ECOG PS: 1     ASSESSMENT:  A 63-year-old gentleman with AL kappa amyloidosis subtype in the lymph node and bone marrow.      On 02/13/2019, he was seen by me for initial evaluation for development of microcytic anemia.    The patient had a normal B12, normal LDH, normal ferritin, normal folate.  His hemoglobin had in fact improved to 11.1 and his MCV has normalized.  He has mild thrombocytopenia with platelet count of 120,000.  Manpreet test was negative.  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  He proceeded with a bone marrow biopsy on 02/20/2019.  It showed normal cellular marrow with 50% cellularity, trilineage hematopoiesis with relative erythroid hyperplasia, 1% polytypic plasma cells, persistent amyloidosis, several foci of amyloid were present around small vessels.      Given that his hemoglobin was improving, the MCV had normalized, and also no significant abnormalities on bone marrow biopsy except for persistent amyloidosis, Observation was recommended. He was advised to schedule an appointment with Dr. Ming Trinh at [facility].    - Refer to dermatology for scaly skin lesion on right calf  - Low blood pressure seems to be related to anti hypotensives.     PLAN:    1- Stop lisinopril and hydrochlorothiazide.   2- See Dr. Colvin at the end of July as scheduled  3- RTC MD 4 months with labs- CBC diff, CMP, SPEP, IFXN, light chains  4- Refer to dermatology   5- IV fluid today for hypotension    RAVI KAM MD    5/16/2019     cc: Vince Trinh MD

## 2019-05-16 NOTE — PROGRESS NOTES
Infusion Nursing Note:  Parmjit Hernández presents today for IVFs.    Patient seen by provider today: Yes: Dr. Castro   present during visit today: Not Applicable.    Note: N/A.    Intravenous Access:  Peripheral IV placed.    Treatment Conditions:  Not Applicable.      Post Infusion Assessment:  Patient tolerated infusion without incident.  Blood return noted pre and post infusion.  Site patent and intact, free from redness, edema or discomfort.  No evidence of extravasations.  Access discontinued per protocol.       Discharge Plan:   Discharge instructions reviewed with: Patient.  Patient and/or family verbalized understanding of discharge instructions and all questions answered.  AVS to patient via [employer].  Patient will return in 4 months for next appointment.   Patient discharged in stable condition accompanied by: self.  Departure Mode: Ambulatory.    Marshall Santos RN

## 2019-05-16 NOTE — LETTER
"    5/16/2019         RE: Parmjit Hernández  [address]  [place] [address]        Dear Colleague,    Thank you for referring your patient, Parmjit Hernández, to the [facility]. Please see a copy of my visit note below.    [facility] PHYSICIANS  HEMATOLOGY ONCOLOGY     DIAGNOSES:     1. 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  In 06/2016, a 1.4 cm lytic lesion in the radial diaphysis and in calvarium.  Bone marrow biopsy 06/2016 normal cellular marrow, 50% cellularity, 5% plasma cells, 4% kappa monotypic plasma cells.  Staining positive for Congo red, normal cytogenetics, translocation 11;14, monosomal 13.  PET/CT scan 06/2016:  Lytic lesions were not identified.  Subcarinal lymph node was positive for amyloidosis treated with CyBorD starting from 08/2006, very good partial response with normalization of the light chain and mildly persistent elevation of the light chain ratio.  In 11/2016, proceeded with auto stem cell transplant and achieved complete hematological response.  All of this was accomplished at [facility] under the care of Dr. Ming Trinh.   2.  Chronic abdominal pain, unclear etiology.  Follows up with [facility].  In 12/2018, he had a pain pump placed.      TREATMENT:  Observation.      SUBJECTIVE:  The patient was seen as a followup today. He is fatigued. He was recently started back on his blood pressure medication.     REVIEW OF SYSTEMS:  A complete review of systems was performed and found to be negative other than pertinent positives mentioned in history of present illness.      Past medical, social histories reviewed.     Meds- Reviewed.     PHYSICAL EXAMINATION:   VITAL SIGNS: BP (!) 72/50   Pulse 75   Temp 97.6  F (36.4  C) (Oral)   Ht 1.702 m (5' 7\")   Wt 117.4 kg (258 lb 14.4 oz)  "  SpO2 96%   BMI 40.55 kg/m     CONSTITUTIONAL: Sitting comfortably.   HEENT: Pupils are equal. Oropharynx is clear.   NECK: No cervical or supraclavicular lymphadenopathy.   RESPIRATORY: Clear bilaterally.   CARD/VASC: S1, S2, regular.   GI: Soft, nontender, nondistended, no hepatosplenomegaly.   MUSKULOSKELETAL: Warm, well perfused.   NEUROLOGIC: Alert, awake.   INTEGUMENT: No rash.   LYMPHATICS: No edema.   PSYCH: Appears anxious and tired.      LABORATORY DATA AND IMAGING REVIEWED DURING THIS VISIT:  Recent Labs   Lab Test 05/13/19  1153 05/13/19  0905    142   POTASSIUM 4.8 4.6   CHLORIDE 110* 108   CO2 24 28   ANIONGAP 6 6   BUN 29 29   CR 1.26* 1.32*   * 124*   LUIS 8.6 8.7     Recent Labs   Lab Test 05/13/19  1153 05/13/19  0905 05/11/19  0559 05/10/19  1616 05/09/19  1202   WBC 3.7* 3.6* 3.0* 3.7* 5.2   HGB 10.6* 10.8* 11.0* 10.5* 11.8*   * 122* 114* 117* 154    97 96 96 97   NEUTROPHIL  --  56.6  --  67.3 66.7     Recent Labs   Lab Test 05/13/19  0905 05/09/19  1202 02/13/19  1410  08/28/18  1259   BILITOTAL 0.5 0.5 0.3   < > 1.6*   ALKPHOS 95 84 107   < > 75   ALT 32 32 25   < > 28   AST 17 18 18   < > 15   ALBUMIN 3.6 3.8 3.2*   < > 4.0   LDH  --   --  161  --  465*    < > = values in this interval not displayed.   Results for ERIC PRESCOTT (MRN [id number]) as of 5/16/2019 15:19   Ref. Range 2/13/2019 14:10 2/15/2019 14:00 2/20/2019 09:19 2/20/2019 09:19 5/9/2019 12:02   Gamma Fraction Latest Ref Range: 0.7 - 1.6 g/dL 0.6 (L)    0.5 (L)   Haptoglobin Latest Ref Range: 35 - 175 mg/dL 61       IGA Latest Ref Range: 70 - 380 mg/dL 41 (L)    38 (L)   IGG Latest Ref Range: 695 - 1,620 mg/dL 577 (L)    515 (L)   IGM Latest Ref Range: 60 - 265 mg/dL 40 (L)    39 (L)   Immunofixation ELP Unknown No monoclonal pro...    No monoclonal pro...   Kappa Free Lt Chain Latest Ref Range: 0.33 - 1.94 mg/dL 1.59    1.18   Kappa Lambda Ratio Latest Ref Range: 0.26 - 1.65  1.06    0.43   Lambda  Free Lt Chain Latest Ref Range: 0.57 - 2.63 mg/dL 1.50    2.72 (H)   LEUKEMIA LYMPHOMA EVALUATION (FLOW CYTOMETRY) Unknown   Rpt Rpt    Monoclonal Peak Latest Ref Range: 0.0 g/dL 0.0    0.0   PROCESS AND HOLD DNA Unknown   Rpt     Scleroderma Antibody Scl-70 KENA IgG Latest Ref Range: 0.0 - 0.9 AI  <0.2        ECOG PS: 1     ASSESSMENT:  A 63-year-old gentleman with AL kappa amyloidosis subtype in the lymph node and bone marrow.      On 02/13/2019, he was seen by me for initial evaluation for development of microcytic anemia.    The patient had a normal B12, normal LDH, normal ferritin, normal folate.  His hemoglobin had in fact improved to 11.1 and his MCV has normalized.  He has mild thrombocytopenia with platelet count of 120,000.  Manpreet test was negative.  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  He proceeded with a bone marrow biopsy on 02/20/2019.  It showed normal cellular marrow with 50% cellularity, trilineage hematopoiesis with relative erythroid hyperplasia, 1% polytypic plasma cells, persistent amyloidosis, several foci of amyloid were present around small vessels.      Given that his hemoglobin was improving, the MCV had normalized, and also no significant abnormalities on bone marrow biopsy except for persistent amyloidosis, Observation was recommended. He was advised to schedule an appointment with Dr. [facility].    - Refer to dermatology for scaly skin lesion on right calf  - Low blood pressure seems to be related to anti hypotensives.     PLAN:    1- Stop lisinopril and hydrochlorothiazide.   2- See Dr. Colvin at the end of July as scheduled  3- RTC MD 4 months with labs- CBC diff, CMP, SPEP, IFXN, light chains  4- Refer to dermatology   5- IV fluid today for hypotension    RAVI KAM MD    5/16/2019     cc: Vince Trinh MD       Again, thank you for  allowing me to participate in the care of your patient.        Sincerely,        Mary Castro MD

## 2019-05-20 NOTE — RESULT ENCOUNTER NOTE
Dear Erickson,   Here are your recent results. All your labs are consistent with your previous labs.     Your kidney function has improved. I would recommend repeating it in 3 months.     Your cholesterol panel also improved since last year.     - Your complete blood counts continues to be the same.     Vince Henry MD

## 2019-05-24 ENCOUNTER — TRANSFERRED RECORDS (OUTPATIENT)
Dept: HEALTH INFORMATION MANAGEMENT | Facility: CLINIC | Age: 64
End: 2019-05-24

## 2019-05-29 ENCOUNTER — OFFICE VISIT (OUTPATIENT)
Dept: FAMILY MEDICINE | Facility: CLINIC | Age: 64
End: 2019-05-29
Payer: COMMERCIAL

## 2019-05-29 VITALS
BODY MASS INDEX: 39 KG/M2 | RESPIRATION RATE: 15 BRPM | DIASTOLIC BLOOD PRESSURE: 86 MMHG | OXYGEN SATURATION: 98 % | WEIGHT: 248.5 LBS | SYSTOLIC BLOOD PRESSURE: 124 MMHG | TEMPERATURE: 97.8 F | HEIGHT: 67 IN | HEART RATE: 85 BPM

## 2019-05-29 DIAGNOSIS — R79.89 ELEVATED SERUM CREATININE: ICD-10-CM

## 2019-05-29 DIAGNOSIS — E11.69 TYPE 2 DIABETES MELLITUS WITH OTHER SPECIFIED COMPLICATION, WITHOUT LONG-TERM CURRENT USE OF INSULIN (H): ICD-10-CM

## 2019-05-29 DIAGNOSIS — L40.0 PLAQUE PSORIASIS: ICD-10-CM

## 2019-05-29 DIAGNOSIS — I95.2 HYPOTENSION DUE TO DRUGS: ICD-10-CM

## 2019-05-29 DIAGNOSIS — I10 HYPERTENSION GOAL BP (BLOOD PRESSURE) < 140/90: Primary | ICD-10-CM

## 2019-05-29 PROCEDURE — 99214 OFFICE O/P EST MOD 30 MIN: CPT | Performed by: FAMILY MEDICINE

## 2019-05-29 ASSESSMENT — ANXIETY QUESTIONNAIRES
5. BEING SO RESTLESS THAT IT IS HARD TO SIT STILL: NOT AT ALL
6. BECOMING EASILY ANNOYED OR IRRITABLE: NOT AT ALL
2. NOT BEING ABLE TO STOP OR CONTROL WORRYING: SEVERAL DAYS
GAD7 TOTAL SCORE: 5
4. TROUBLE RELAXING: SEVERAL DAYS
7. FEELING AFRAID AS IF SOMETHING AWFUL MIGHT HAPPEN: SEVERAL DAYS
GAD7 TOTAL SCORE: 5
GAD7 TOTAL SCORE: 5
3. WORRYING TOO MUCH ABOUT DIFFERENT THINGS: SEVERAL DAYS
1. FEELING NERVOUS, ANXIOUS, OR ON EDGE: SEVERAL DAYS
7. FEELING AFRAID AS IF SOMETHING AWFUL MIGHT HAPPEN: SEVERAL DAYS

## 2019-05-29 ASSESSMENT — MIFFLIN-ST. JEOR: SCORE: 1885.82

## 2019-05-29 ASSESSMENT — PATIENT HEALTH QUESTIONNAIRE - PHQ9
10. IF YOU CHECKED OFF ANY PROBLEMS, HOW DIFFICULT HAVE THESE PROBLEMS MADE IT FOR YOU TO DO YOUR WORK, TAKE CARE OF THINGS AT HOME, OR GET ALONG WITH OTHER PEOPLE: SOMEWHAT DIFFICULT
SUM OF ALL RESPONSES TO PHQ QUESTIONS 1-9: 4
SUM OF ALL RESPONSES TO PHQ QUESTIONS 1-9: 4

## 2019-05-29 NOTE — PROGRESS NOTES
Subjective     Parmjit Hernández is a 63 year old male who presents to clinic today for the following health issues:    HPI   Patient here today to discuss his recent low BP and med changes due to that.     the patient presents to clinic for follow-up visit.  He was recently noted to have a low blood pressure.  He was seen and evaluated by hematology was advised to continue to hold lisinopril and hydrochlorothiazide.  Currently he denies any symptoms.  Denies any fatigue weakness or shortness of breath.      Denies any swelling in his lower extremities.        H he has a rash in his right lower extremity. was seen and evaluated by dermatology one is informed that he has plaque psoriasis.      His blood glucose continues to be stable.  His fasting blood glucose was at 103 fasting this morning.    PHQ-9 SCORE 5/25/2017 5/16/2018 5/29/2019   PHQ-9 Total Score MyChart - - 4 (Minimal depression)   PHQ-9 Total Score 14 16 4     NANCY-7 SCORE 5/25/2017 5/16/2018 5/29/2019   Total Score - - 5 (mild anxiety)   Total Score 5 11 5         Patient Active Problem List   Diagnosis     Obstructive sleep apnea     HYPERLIPIDEMIA LDL GOAL <100     Hypertension goal BP (blood pressure) < 140/90     Advanced directives, counseling/discussion     Migraine     History of diverticulitis     MENTAL HEALTH     Marianne (H)     Bipolar I disorder (H)     Chronic abdominal pain     Anxiety     Other amyloidosis (H)     Insomnia due to medical condition     Narcotic dependence (H)     Obstructive sleep apnea syndrome     Type 2 diabetes mellitus without complication, without long-term current use of insulin (H)     Restless legs syndrome (RLS)     Type 2 diabetes mellitus with other specified complication (H)     Peripheral edema     Morbid obesity (H)     Stasis dermatitis of both legs     Polyneuropathy associated with underlying disease (H)     Acquired lymphedema     Renal failure     Low blood pressure reading     Past Surgical History:    Procedure Laterality Date     BMT PROTOCOL      for systemic amyloidosis     BONE MARROW BIOPSY, BONE SPECIMEN, NEEDLE/TROCAR N/A 6/8/2016    Procedure: BIOPSY BONE MARROW;  Surgeon: Nathan Agrawal MD;  Location:  GI     BONE MARROW BIOPSY, BONE SPECIMEN, NEEDLE/TROCAR N/A 2/20/2019    Procedure: BIOPSY BONE MARROW;  Surgeon: Michael Raygoza MD;  Location:  GI     C NONSPECIFIC PROCEDURE  94    Cholecystectomy     C NONSPECIFIC PROCEDURE  2000    repair deviated septum     CHOLECYSTECTOMY  1995    lap qian     COLONOSCOPY  2012    hx polyps     ESOPHAGOSCOPY, GASTROSCOPY, DUODENOSCOPY (EGD), COMBINED N/A 5/29/2015    Procedure: COMBINED ESOPHAGOSCOPY, GASTROSCOPY, DUODENOSCOPY (EGD), BIOPSY SINGLE OR MULTIPLE;  Surgeon: John Jacob MD;  Location:  GI     HERNIA REPAIR, UMBILICAL  2006       Social History     Tobacco Use     Smoking status: Never Smoker     Smokeless tobacco: Never Used   Substance Use Topics     Alcohol use: No     Alcohol/week: 0.0 oz     Family History   Problem Relation Age of Onset     Cerebrovascular Disease Mother      C.A.D. Mother      Hypertension Mother      Alcohol/Drug Mother      Arthritis Mother      Cerebrovascular Disease Maternal Grandmother      C.A.D. Maternal Grandmother      Alcohol/Drug Maternal Grandmother      C.A.D. Father      Heart Disease Father      Hypertension Father      Alcohol/Drug Father      Allergies Father      Circulatory Father      Depression Father      Respiratory Father      Asthma Father      Alcohol/Drug Paternal Grandfather      Cerebrovascular Disease Paternal Grandfather      Depression Son      Psychotic Disorder Son         anxiety disorder     Depression Daughter      Cerebrovascular Disease Maternal Grandfather      Cerebrovascular Disease Paternal Grandmother      Diabetes Brother      Allergies Sister      Depression Sister      Gynecology Sister              Reviewed and updated as needed this visit by  "Provider         Review of Systems   ROS COMP: Constitutional, HEENT, cardiovascular, pulmonary, gi and gu systems are negative, except as otherwise noted.      Objective    /86   Pulse 85   Temp 97.8  F (36.6  C) (Oral)   Resp 15   Ht 1.702 m (5' 7\")   Wt 112.7 kg (248 lb 8 oz)   SpO2 98%   BMI 38.92 kg/m    Body mass index is 38.92 kg/m .  Physical Exam   GENERAL: healthy, alert and no distress  RESP: lungs clear to auscultation - no rales, rhonchi or wheezes  CV: regular rate and rhythm, normal S1 S2, no S3 or S4, no murmur, click or rub, no peripheral edema and peripheral pulses strong  MS: no gross musculoskeletal defects noted, no edema  SKIN: raised erythematous plaque in the right lower extremity.     Diagnostic Test Results:  Labs reviewed in Epic        Assessment & Plan     1. Hypertension goal BP (blood pressure) < 140/90  2. Hypotension due to drugs  Low blood pressure today.   Advised to continue to hold lisinopril and hydrochlorothiazide.   Recheck BP at home    2. Type 2 diabetes mellitus with other specified complication, without long-term current use of insulin (H)  Well controlled   Fasting blood glucose is at 103  Continue to hold Glipizide.   Continue taking Metformin 2000mg daily.     3. Plaque psoriasis  Seen by dermatology.     4. Elevated serum creatine  Recent BMP is 0.1 above lab's listed normal level.   Continue to monitor and repeat in 3-4 months.      Return in about 3 months (around 8/29/2019) for Routine Visit with PCP - if needed.    Vince Henry MD  [facility]      Answers for HPI/ROS submitted by the patient on 5/29/2019   If you checked off any problems, how difficult have these problems made it for you to do your work, take care of things at home, or get along with other people?: Somewhat difficult  PHQ9 TOTAL SCORE: 4  NANCY 7 TOTAL SCORE: 5    "

## 2019-05-29 NOTE — NURSING NOTE
"Chief Complaint   Patient presents with     RECHECK     Follow up- Blood pressure has been low and meds were discontinued by Dr. Castro     /86   Pulse 85   Temp 97.8  F (36.6  C) (Oral)   Resp 15   Ht 1.702 m (5' 7\")   Wt 112.7 kg (248 lb 8 oz)   SpO2 98%   BMI 38.92 kg/m   Estimated body mass index is 38.92 kg/m  as calculated from the following:    Height as of this encounter: 1.702 m (5' 7\").    Weight as of this encounter: 112.7 kg (248 lb 8 oz).  Medication Reconciliation: complete        Health Maintenance Due Pending Provider Review:  PHQ9 and GAD7    Completed today.    Shante Shaikh MA  [facility]  [phone]  "
(0) independent

## 2019-05-30 ASSESSMENT — ANXIETY QUESTIONNAIRES: GAD7 TOTAL SCORE: 5

## 2019-06-27 ENCOUNTER — TELEPHONE (OUTPATIENT)
Dept: FAMILY MEDICINE | Facility: CLINIC | Age: 64
End: 2019-06-27

## 2019-06-27 NOTE — TELEPHONE ENCOUNTER
FYI~ A refill has been made to the  assistance program for Lyrica.    A 90 day supply of LYRICA will be delivered to Erickson's home within 7-10 business days.    Thank you,    Merle Morales  Prescription Assistance

## 2019-07-02 DIAGNOSIS — E11.9 TYPE 2 DIABETES MELLITUS WITHOUT COMPLICATION, WITHOUT LONG-TERM CURRENT USE OF INSULIN (H): ICD-10-CM

## 2019-07-02 DIAGNOSIS — N17.9 AKI (ACUTE KIDNEY INJURY) (H): ICD-10-CM

## 2019-07-02 RX ORDER — METFORMIN HCL 500 MG
TABLET, EXTENDED RELEASE 24 HR ORAL
Refills: 0
Start: 2019-07-02

## 2019-07-02 RX ORDER — METFORMIN HCL 500 MG
1000 TABLET, EXTENDED RELEASE 24 HR ORAL
Qty: 60 TABLET | Refills: 0 | Status: SHIPPED | OUTPATIENT
Start: 2019-07-02 | End: 2019-07-31

## 2019-07-02 NOTE — TELEPHONE ENCOUNTER
Last Written Prescription Date:  05/13/2019  Last Fill Quantity: 60,  # refills: 0   Last office visit: 5/29/2019 with prescribing provider:     Future Office Visit:   Next 5 appointments (look out 90 days)    Sep 13, 2019  1:30 PM CDT  Return Visit with  LAB DRAW 1  [facility] ([facility]) [facility] [place] [id number]  [place] [address] [phone]   Sep 18, 2019  1:45 PM CDT  Return Visit with Mary Castro MD  [facility] ([facility]) [facility] [place] [id number]  [place] [address] [phone]       Requested Prescriptions   Pending Prescriptions Disp Refills     metFORMIN (GLUCOPHAGE-XR) 500 MG 24 hr tablet [Pharmacy Med Name: METFORMIN ER 500MG 24HR TABS] 360 tablet 0     Sig: TAKE 2 TABLETS(1000 MG) BY MOUTH TWICE DAILY WITH MEALS       Biguanide Agents Passed - 7/2/2019  2:58 PM        Passed - Blood pressure less than 140/90 in past 6 months     BP Readings from Last 3 Encounters:   05/29/19 124/86   05/16/19 (!) 72/50   05/16/19 105/69                 Passed - Patient has documented LDL within the past 12 mos.     Recent Labs   Lab Test 05/13/19  1153   LDL 39             Passed - Patient has had a Microalbumin in the past 15 mos.     Recent Labs   Lab Test 05/13/19  1153  06/23/16   MICROALB  --   --  15.7   MICROL <5   < >  --    UMALCR Unable to calculate due to low value   < > 21    < > = values in this interval not displayed.             Passed - Patient is age 10 or older        Passed - Patient has documented A1c within the specified period of time.     If HgbA1C is 8 or greater, it needs to be on file within the past 3 months.  If less than 8, must be on file within the past 6 months.     Recent Labs   Lab Test 05/10/19  1616   A1C 4.7             Passed - Patient's CR is NOT>1.4 OR Patient's EGFR is NOT<45 within past 12 mos.     Recent Labs   Lab Test  "05/13/19  1153   GFRESTIMATED 60*   GFRESTBLACK 70       Recent Labs   Lab Test 05/13/19  1153   CR 1.26*             Passed - Patient does NOT have a diagnosis of CHF.        Passed - Medication is active on med list        Passed - Recent (6 mo) or future (30 days) visit within the authorizing provider's specialty     Patient had office visit in the last 6 months or has a visit in the next 30 days with authorizing provider or within the authorizing provider's specialty.  See \"Patient Info\" tab in inbasket, or \"Choose Columns\" in Meds & Orders section of the refill encounter.            "

## 2019-07-25 ENCOUNTER — TRANSFERRED RECORDS (OUTPATIENT)
Dept: HEALTH INFORMATION MANAGEMENT | Facility: CLINIC | Age: 64
End: 2019-07-25

## 2019-07-25 LAB
AST SERPL-CCNC: 26 U/L (ref 8–48)
CREAT SERPL-MCNC: 0.92 MG/DL (ref 0.74–1.35)
EJECTION FRACTION: 59
GFR SERPL CREATININE-BSD FRML MDRD: 88 ML/MIN/BSA
POTASSIUM SERPL-SCNC: 4.5 MMOL/L (ref 3.6–5.2)

## 2019-07-26 ENCOUNTER — TRANSFERRED RECORDS (OUTPATIENT)
Dept: HEALTH INFORMATION MANAGEMENT | Facility: CLINIC | Age: 64
End: 2019-07-26

## 2019-07-26 DIAGNOSIS — N17.9 AKI (ACUTE KIDNEY INJURY) (H): ICD-10-CM

## 2019-07-31 ENCOUNTER — OFFICE VISIT (OUTPATIENT)
Dept: FAMILY MEDICINE | Facility: CLINIC | Age: 64
End: 2019-07-31
Payer: COMMERCIAL

## 2019-07-31 VITALS
WEIGHT: 265 LBS | DIASTOLIC BLOOD PRESSURE: 83 MMHG | HEART RATE: 72 BPM | TEMPERATURE: 97.5 F | BODY MASS INDEX: 41.59 KG/M2 | HEIGHT: 67 IN | SYSTOLIC BLOOD PRESSURE: 154 MMHG | RESPIRATION RATE: 15 BRPM | OXYGEN SATURATION: 96 %

## 2019-07-31 DIAGNOSIS — E11.9 TYPE 2 DIABETES MELLITUS WITHOUT COMPLICATION, WITHOUT LONG-TERM CURRENT USE OF INSULIN (H): Primary | ICD-10-CM

## 2019-07-31 DIAGNOSIS — Z23 ENCOUNTER FOR IMMUNIZATION: ICD-10-CM

## 2019-07-31 DIAGNOSIS — Z94.81 AUTOLOGOUS BONE MARROW TRANSPLANTATION STATUS (H): ICD-10-CM

## 2019-07-31 LAB — HBA1C MFR BLD: 5.4 % (ref 0–5.6)

## 2019-07-31 PROCEDURE — 90713 POLIOVIRUS IPV SC/IM: CPT | Performed by: FAMILY MEDICINE

## 2019-07-31 PROCEDURE — 90670 PCV13 VACCINE IM: CPT | Performed by: FAMILY MEDICINE

## 2019-07-31 PROCEDURE — 99213 OFFICE O/P EST LOW 20 MIN: CPT | Mod: 25 | Performed by: FAMILY MEDICINE

## 2019-07-31 PROCEDURE — 83036 HEMOGLOBIN GLYCOSYLATED A1C: CPT | Performed by: FAMILY MEDICINE

## 2019-07-31 PROCEDURE — G0009 ADMIN PNEUMOCOCCAL VACCINE: HCPCS | Mod: 59 | Performed by: FAMILY MEDICINE

## 2019-07-31 PROCEDURE — 90632 HEPA VACCINE ADULT IM: CPT | Performed by: FAMILY MEDICINE

## 2019-07-31 PROCEDURE — 90472 IMMUNIZATION ADMIN EACH ADD: CPT | Performed by: FAMILY MEDICINE

## 2019-07-31 PROCEDURE — 36415 COLL VENOUS BLD VENIPUNCTURE: CPT | Performed by: FAMILY MEDICINE

## 2019-07-31 PROCEDURE — 90648 HIB PRP-T VACCINE 4 DOSE IM: CPT | Performed by: FAMILY MEDICINE

## 2019-07-31 PROCEDURE — 90471 IMMUNIZATION ADMIN: CPT | Performed by: FAMILY MEDICINE

## 2019-07-31 PROCEDURE — 90734 MENACWYD/MENACWYCRM VACC IM: CPT | Performed by: FAMILY MEDICINE

## 2019-07-31 PROCEDURE — 90700 DTAP VACCINE < 7 YRS IM: CPT | Performed by: FAMILY MEDICINE

## 2019-07-31 RX ORDER — METFORMIN HCL 500 MG
1000 TABLET, EXTENDED RELEASE 24 HR ORAL 2 TIMES DAILY WITH MEALS
Qty: 60 TABLET | Refills: 0 | Status: SHIPPED | OUTPATIENT
Start: 2019-07-31 | End: 2019-08-14

## 2019-07-31 RX ORDER — LANCETS
EACH MISCELLANEOUS
Refills: 1 | COMMUNITY
Start: 2019-06-28 | End: 2020-11-12

## 2019-07-31 RX ORDER — KETOCONAZOLE 20 MG/ML
SHAMPOO TOPICAL
Refills: 11 | COMMUNITY
Start: 2019-05-23 | End: 2020-02-07

## 2019-07-31 RX ORDER — TAMSULOSIN HYDROCHLORIDE 0.4 MG/1
0.4 CAPSULE ORAL DAILY
Refills: 3 | COMMUNITY
Start: 2019-06-18 | End: 2020-07-27

## 2019-07-31 ASSESSMENT — MIFFLIN-ST. JEOR: SCORE: 1955.66

## 2019-07-31 NOTE — PROGRESS NOTES
Subjective     Parmjit Hernández is a 64 year old male who presents to clinic today for the following health issues:    HPI   Patient is here to have al baby vaccines again.   The patient was seen by his hematologist at the [facility]. He was informed that he needs a new set of childhood immunizations.  He was advised return for a follow-up visit in 3-5 years.    Chronic fatigue is stable. He is taking modafinil 200 mg TWICE DAILY.     Patient Active Problem List   Diagnosis     Obstructive sleep apnea     HYPERLIPIDEMIA LDL GOAL <100     Hypertension goal BP (blood pressure) < 140/90     Advanced directives, counseling/discussion     Migraine     History of diverticulitis     MENTAL HEALTH     Marianne (H)     Bipolar I disorder (H)     Chronic abdominal pain     Anxiety     Other amyloidosis (H)     Insomnia due to medical condition     Narcotic dependence (H)     Obstructive sleep apnea syndrome     Type 2 diabetes mellitus without complication, without long-term current use of insulin (H)     Restless legs syndrome (RLS)     Type 2 diabetes mellitus with other specified complication (H)     Peripheral edema     Morbid obesity (H)     Stasis dermatitis of both legs     Polyneuropathy associated with underlying disease (H)     Acquired lymphedema     Renal failure     Low blood pressure reading     Past Surgical History:   Procedure Laterality Date     BMT PROTOCOL      for systemic amyloidosis     BONE MARROW BIOPSY, BONE SPECIMEN, NEEDLE/TROCAR N/A 6/8/2016    Procedure: BIOPSY BONE MARROW;  Surgeon: Nathan Agrawal MD;  Location:  GI     BONE MARROW BIOPSY, BONE SPECIMEN, NEEDLE/TROCAR N/A 2/20/2019    Procedure: BIOPSY BONE MARROW;  Surgeon: Michael Raygoza MD;  Location:  GI     C NONSPECIFIC PROCEDURE  94    Cholecystectomy     C NONSPECIFIC PROCEDURE  2000    repair deviated septum     CHOLECYSTECTOMY  1995    lap qian     COLONOSCOPY  2012    hx polyps     ESOPHAGOSCOPY, GASTROSCOPY,  "DUODENOSCOPY (EGD), COMBINED N/A 5/29/2015    Procedure: COMBINED ESOPHAGOSCOPY, GASTROSCOPY, DUODENOSCOPY (EGD), BIOPSY SINGLE OR MULTIPLE;  Surgeon: John Jacob MD;  Location:  GI     HERNIA REPAIR, UMBILICAL  2006       Social History     Tobacco Use     Smoking status: Never Smoker     Smokeless tobacco: Never Used   Substance Use Topics     Alcohol use: No     Alcohol/week: 0.0 oz     Family History   Problem Relation Age of Onset     Cerebrovascular Disease Mother      C.A.D. Mother      Hypertension Mother      Alcohol/Drug Mother      Arthritis Mother      Cerebrovascular Disease Maternal Grandmother      C.A.D. Maternal Grandmother      Alcohol/Drug Maternal Grandmother      C.A.D. Father      Heart Disease Father      Hypertension Father      Alcohol/Drug Father      Allergies Father      Circulatory Father      Depression Father      Respiratory Father      Asthma Father      Alcohol/Drug Paternal Grandfather      Cerebrovascular Disease Paternal Grandfather      Depression Son      Psychotic Disorder Son         anxiety disorder     Depression Daughter      Cerebrovascular Disease Maternal Grandfather      Cerebrovascular Disease Paternal Grandmother      Diabetes Brother      Allergies Sister      Depression Sister      Gynecology Sister            Reviewed and updated as needed this visit by Provider         Review of Systems   ROS COMP: Constitutional, HEENT, cardiovascular, pulmonary, gi and gu systems are negative, except as otherwise noted.      Objective    BP (!) 154/83   Pulse 72   Temp 97.5  F (36.4  C) (Oral)   Resp 15   Ht 1.702 m (5' 7\")   Wt 120.2 kg (265 lb)   SpO2 96%   BMI 41.50 kg/m    Body mass index is 41.5 kg/m .  Physical Exam   GENERAL: healthy, alert and no distress  NECK: no adenopathy, no asymmetry, masses, or scars and thyroid normal to palpation  RESP: lungs clear to auscultation - no rales, rhonchi or wheezes  CV: regular rate and rhythm, normal S1 S2, no " S3 or S4, no murmur, click or rub, no peripheral edema and peripheral pulses strong  ABDOMEN: soft, nontender, no hepatosplenomegaly, no masses and bowel sounds normal  MS: no gross musculoskeletal defects noted, no edema    Diagnostic Test Results:  Labs reviewed in Epic        Assessment & Plan       ICD-10-CM    1. Type 2 diabetes mellitus without complication, without long-term current use of insulin (H) E11.9 metFORMIN (GLUCOPHAGE-XR) 500 MG 24 hr tablet     Hemoglobin A1c   2. Autologous bone marrow transplantation status (H) Z94.81 DTAP IMMUNIZATION (<7Y), IM     HEPA VACCINE ADULT IM     HEPATITIS B VAC;ADULT     HIB, IM (6 WKS - 5 YRS) - ActHIB     MCV4, MENINGOCOCCAL CONJ, IM (9 MO - 55 YRS) - Menactra     IPV, IM/SUBQ (6+ WKS)     PCV13, IM (6+ WK) - [id number]   3. Encounter for immunization Z23 DTAP IMMUNIZATION (<7Y), IM     HEPA VACCINE ADULT IM     HEPATITIS B VAC;ADULT     HIB, IM (6 WKS - 5 YRS) - ActHIB     MCV4, MENINGOCOCCAL CONJ, IM (9 MO - 55 YRS) - Menactra     IPV, IM/SUBQ (6+ WKS)     PCV13, IM (6+ WK) - [id number]      The patient will needs a new A1C, Will add Glipizide 2.5mg if A1C is up trending. His hemoglobin is at 12 mg/dl (had it done with his hematologist)      Return in about 2 months (around 9/30/2019) for immunizations.    Vince Henry MD  [facility]

## 2019-07-31 NOTE — NURSING NOTE
Screening Questionnaire for Adult Immunization    Are you sick today?   No   Do you have allergies to medications, food, a vaccine component or latex?   No   Have you ever had a serious reaction after receiving a vaccination?   No   Do you have a long-term health problem with heart disease, lung disease, asthma, kidney disease, metabolic disease (e.g. diabetes), anemia, or other blood disorder?   No   Do you have cancer, leukemia, HIV/AIDS, or any other immune system problem?   No   In the past 3 months, have you taken medications that affect  your immune system, such as prednisone, other steroids, or anticancer drugs; drugs for the treatment of rheumatoid arthritis, Crohn s disease, or psoriasis; or have you had radiation treatments?   No   Have you had a seizure, or a brain or other nervous system problem?   No   During the past year, have you received a transfusion of blood or blood     products, or been given immune (gamma) globulin or antiviral drug?   No   For women: Are you pregnant or is there a chance you could become        pregnant during the next month?   No   Have you received any vaccinations in the past 4 weeks?   No     Immunization questionnaire answers were all negative.        Per orders of Dr. Henry , injection of Hep A, [id number], IPV, Dtap, Menactra, and ActHib  given by Rebecca Elise. Patient instructed to remain in clinic for 15 minutes afterwards, and to report any adverse reaction to me immediately.       Screening performed by Rebecca Elise on 7/31/2019 at 11:20 AM.

## 2019-07-31 NOTE — NURSING NOTE
"Chief Complaint   Patient presents with     Immune Deficiency     BP (!) 154/83   Pulse 72   Temp 97.5  F (36.4  C) (Oral)   Resp 15   Ht 1.702 m (5' 7\")   Wt 120.2 kg (265 lb)   SpO2 96%   BMI 41.50 kg/m   Estimated body mass index is 41.5 kg/m  as calculated from the following:    Height as of this encounter: 1.702 m (5' 7\").    Weight as of this encounter: 120.2 kg (265 lb).  bp completed using cuff size: large       Health Maintenance addressed:  BP was high, used pink card, recheck manually    Possibly completing today per provider review.    Rebecca Elise MA     "

## 2019-08-14 ENCOUNTER — MYC MEDICAL ADVICE (OUTPATIENT)
Dept: FAMILY MEDICINE | Facility: CLINIC | Age: 64
End: 2019-08-14

## 2019-08-14 DIAGNOSIS — E11.9 TYPE 2 DIABETES MELLITUS WITHOUT COMPLICATION, WITHOUT LONG-TERM CURRENT USE OF INSULIN (H): ICD-10-CM

## 2019-08-14 RX ORDER — METFORMIN HCL 500 MG
1000 TABLET, EXTENDED RELEASE 24 HR ORAL 2 TIMES DAILY WITH MEALS
Qty: 120 TABLET | Refills: 0 | Status: SHIPPED | OUTPATIENT
Start: 2019-08-14 | End: 2019-09-24

## 2019-08-26 DIAGNOSIS — G62.9 PERIPHERAL POLYNEUROPATHY: Primary | ICD-10-CM

## 2019-08-26 NOTE — TELEPHONE ENCOUNTER
It is time to refill Erickson's Lyrica through the [employer] assistance program.  I see his Lyrica has been increased from three times a day to four times a day.  [employer] requires a hand signed, brand name, hard copy script be submitted for this dose increase/refill.    Please hand sign a BRAND name, hard copy script for:     LYRICA    Please send the script to me via interoffice mail [facility] Barb Navarro or via [place] mail  at:      [facility]   Barb Huerta   [address] [place]  [address]    Thanks so much for your help!    Barb Huerta  Prescription   Pharmacy Assistance  [phone]

## 2019-08-28 ENCOUNTER — TRANSFERRED RECORDS (OUTPATIENT)
Dept: HEALTH INFORMATION MANAGEMENT | Facility: CLINIC | Age: 64
End: 2019-08-28

## 2019-08-28 RX ORDER — PREGABALIN 100 MG/1
100 CAPSULE ORAL 4 TIMES DAILY
Qty: 360 CAPSULE | Refills: 1 | Status: SHIPPED | OUTPATIENT
Start: 2019-08-28 | End: 2020-01-31

## 2019-09-03 ENCOUNTER — TELEPHONE (OUTPATIENT)
Dept: FAMILY MEDICINE | Facility: CLINIC | Age: 64
End: 2019-09-03

## 2019-09-03 NOTE — TELEPHONE ENCOUNTER
Panel Management Review      Patient has the following on his problem list:     Hypertension   Last three blood pressure readings:  BP Readings from Last 3 Encounters:   07/31/19 (!) 154/83   05/29/19 124/86   05/16/19 (!) 72/50     Blood pressure: FAILED    HTN Guidelines:  Less than 140/90      Composite cancer screening  Chart review shows that this patient is due/due soon for the following None  Summary:    Patient is due/failing the following:   BP CHECK    Action needed:   Pt need BP check     Type of outreach:    None     Questions for provider review:    None                                                                                                                                    Rebecca Elise MA       .

## 2019-09-11 DIAGNOSIS — E85.89 OTHER AMYLOIDOSIS (H): Primary | ICD-10-CM

## 2019-09-16 ENCOUNTER — TELEPHONE (OUTPATIENT)
Dept: FAMILY MEDICINE | Facility: CLINIC | Age: 64
End: 2019-09-16

## 2019-09-16 NOTE — TELEPHONE ENCOUNTER
Received form(s) from [facility] prescription program  for application.  Placed form(s) in/on FS's desk.  Forms need to be filled out and signed and mailed..    Call pt to verify form was sent: No  Copy needs to be sent for scanning after completion: Yes

## 2019-09-18 NOTE — TELEPHONE ENCOUNTER
Form completed and signed.   Placed at the [facility] - to be mailed.     Elaine

## 2019-09-23 ENCOUNTER — OFFICE VISIT (OUTPATIENT)
Dept: FAMILY MEDICINE | Facility: CLINIC | Age: 64
End: 2019-09-23
Payer: COMMERCIAL

## 2019-09-23 VITALS
OXYGEN SATURATION: 94 % | BODY MASS INDEX: 41.48 KG/M2 | RESPIRATION RATE: 20 BRPM | TEMPERATURE: 98 F | WEIGHT: 264.3 LBS | HEART RATE: 95 BPM | HEIGHT: 67 IN | SYSTOLIC BLOOD PRESSURE: 145 MMHG | DIASTOLIC BLOOD PRESSURE: 97 MMHG

## 2019-09-23 DIAGNOSIS — E11.9 TYPE 2 DIABETES MELLITUS WITHOUT COMPLICATION, WITHOUT LONG-TERM CURRENT USE OF INSULIN (H): ICD-10-CM

## 2019-09-23 DIAGNOSIS — L03.115 CELLULITIS OF RIGHT HEEL: ICD-10-CM

## 2019-09-23 DIAGNOSIS — R60.0 LOCALIZED EDEMA: Primary | ICD-10-CM

## 2019-09-23 DIAGNOSIS — E11.69 TYPE 2 DIABETES MELLITUS WITH OTHER SPECIFIED COMPLICATION, WITHOUT LONG-TERM CURRENT USE OF INSULIN (H): ICD-10-CM

## 2019-09-23 DIAGNOSIS — E11.51: ICD-10-CM

## 2019-09-23 PROCEDURE — 99214 OFFICE O/P EST MOD 30 MIN: CPT | Performed by: FAMILY MEDICINE

## 2019-09-23 RX ORDER — GLIPIZIDE 2.5 MG/1
2.5 TABLET, EXTENDED RELEASE ORAL DAILY
Qty: 30 TABLET | Refills: 0 | Status: SHIPPED | OUTPATIENT
Start: 2019-09-23 | End: 2019-12-04

## 2019-09-23 RX ORDER — NON-ADHERENT BANDAGE 6" X 6"
3 BANDAGE TOPICAL 3 TIMES DAILY
Qty: 50 EACH | Refills: 0 | Status: SHIPPED | OUTPATIENT
Start: 2019-09-23 | End: 2019-10-07

## 2019-09-23 RX ORDER — SULFAMETHOXAZOLE/TRIMETHOPRIM 800-160 MG
1 TABLET ORAL 2 TIMES DAILY
Qty: 20 TABLET | Refills: 0 | Status: SHIPPED | OUTPATIENT
Start: 2019-09-23 | End: 2019-10-07

## 2019-09-23 RX ORDER — HYDROCHLOROTHIAZIDE 12.5 MG/1
25 TABLET ORAL DAILY
Qty: 30 TABLET | Refills: 0 | Status: SHIPPED | OUTPATIENT
Start: 2019-09-23 | End: 2019-12-06

## 2019-09-23 ASSESSMENT — MIFFLIN-ST. JEOR: SCORE: 1952.49

## 2019-09-23 NOTE — NURSING NOTE
"Chief Complaint   Patient presents with     Ankle/Foot right     BP (!) 145/97   Pulse 95   Temp 98  F (36.7  C) (Oral)   Resp 20   Ht 1.702 m (5' 7\")   Wt 119.9 kg (264 lb 4.8 oz)   SpO2 94%   BMI 41.40 kg/m   Estimated body mass index is 41.4 kg/m  as calculated from the following:    Height as of this encounter: 1.702 m (5' 7\").    Weight as of this encounter: 119.9 kg (264 lb 4.8 oz).  bp completed using cuff size: large      Health Maintenance addressed:  NONE    n/a    Kelly Malik MA    "

## 2019-09-23 NOTE — TELEPHONE ENCOUNTER
"METFORMIN ER 500MG  Last Written Prescription Date:  08/14/19  Last Fill Quantity: 120,  # refills: 0   Last office visit: 9/23/2019 with prescribing provider:  YES   Future Office Visit:      Requested Prescriptions   Pending Prescriptions Disp Refills     metFORMIN (GLUCOPHAGE-XR) 500 MG 24 hr tablet 120 tablet 0     Sig: Take 2 tablets (1,000 mg) by mouth 2 times daily (with meals)       Biguanide Agents Failed - 9/23/2019  3:55 PM        Failed - Blood pressure less than 140/90 in past 6 months     BP Readings from Last 3 Encounters:   09/23/19 (!) 145/97   07/31/19 (!) 154/83   05/29/19 124/86                 Passed - Patient has documented LDL within the past 12 mos.     Recent Labs   Lab Test 05/13/19  1153   LDL 39             Passed - Patient has had a Microalbumin in the past 15 mos.     Recent Labs   Lab Test 05/13/19  1153  06/23/16   MICROALB  --   --  15.7   MICROL <5   < >  --    UMALCR Unable to calculate due to low value   < > 21    < > = values in this interval not displayed.             Passed - Patient is age 10 or older        Passed - Patient has documented A1c within the specified period of time.     If HgbA1C is 8 or greater, it needs to be on file within the past 3 months.  If less than 8, must be on file within the past 6 months.     Recent Labs   Lab Test 07/31/19  1039   A1C 5.4             Passed - Patient's CR is NOT>1.4 OR Patient's EGFR is NOT<45 within past 12 mos.     Recent Labs   Lab Test 07/25/19   GFRESTIMATED 88   GFRESTBLACK >90       Recent Labs   Lab Test 07/25/19   CR 0.92             Passed - Patient does NOT have a diagnosis of CHF.        Passed - Medication is active on med list        Passed - Recent (6 mo) or future (30 days) visit within the authorizing provider's specialty     Patient had office visit in the last 6 months or has a visit in the next 30 days with authorizing provider or within the authorizing provider's specialty.  See \"Patient Info\" tab in inbasket, " "or \"Choose Columns\" in Meds & Orders section of the refill encounter.            "

## 2019-09-23 NOTE — PROGRESS NOTES
Subjective     Parmjit Hernández is a 64 year old male who presents to clinic today for the following health issues:    HPI   Right foot pain      Duration: couple of months    Description (location/character/radiation): left foot and heel pain, broken skin, black and red and swollen    Intensity:  servere    Accompanying signs and symptoms: and ozzing clear puss    History (similar episodes/previous evaluation): none    Precipitating or alleviating factors: None    Therapies tried and outcome: patient has peeled back the dead skin   The patient had small foot ulcer for the past 2 months. 2 weeks ago, it started to drain. Moderate clear fluid. Denies any pain. 1 week ago, he started noticing an ulcer. He does not recall any trauma.     Also, the patient has been noticing that his blood glucose average is up-trending. Average blood glucose ranges from 150's-170's. (Glipizide was on hold due to hypoglycemia)     also, he is noticing his blood pressure is up trending as well. (hematology held both lisinopril and hydrochlorothiazide because of hypotension).     Patient Active Problem List   Diagnosis     Obstructive sleep apnea     HYPERLIPIDEMIA LDL GOAL <100     Hypertension goal BP (blood pressure) < 140/90     Advanced directives, counseling/discussion     Migraine     History of diverticulitis     MENTAL HEALTH     Marianne (H)     Bipolar I disorder (H)     Chronic abdominal pain     Anxiety     Other amyloidosis (H)     Insomnia due to medical condition     Narcotic dependence (H)     Obstructive sleep apnea syndrome     Type 2 diabetes mellitus without complication, without long-term current use of insulin (H)     Restless legs syndrome (RLS)     Type 2 diabetes mellitus with other specified complication (H)     Peripheral edema     Morbid obesity (H)     Stasis dermatitis of both legs     Polyneuropathy associated with underlying disease (H)     Acquired lymphedema     Renal failure     Low blood pressure reading      Past Surgical History:   Procedure Laterality Date     BMT PROTOCOL      for systemic amyloidosis     BONE MARROW BIOPSY, BONE SPECIMEN, NEEDLE/TROCAR N/A 6/8/2016    Procedure: BIOPSY BONE MARROW;  Surgeon: Nathan Agrawal MD;  Location:  GI     BONE MARROW BIOPSY, BONE SPECIMEN, NEEDLE/TROCAR N/A 2/20/2019    Procedure: BIOPSY BONE MARROW;  Surgeon: Michael Raygoza MD;  Location:  GI     C NONSPECIFIC PROCEDURE  94    Cholecystectomy     C NONSPECIFIC PROCEDURE  2000    repair deviated septum     CHOLECYSTECTOMY  1995    lap qian     COLONOSCOPY  2012    hx polyps     ESOPHAGOSCOPY, GASTROSCOPY, DUODENOSCOPY (EGD), COMBINED N/A 5/29/2015    Procedure: COMBINED ESOPHAGOSCOPY, GASTROSCOPY, DUODENOSCOPY (EGD), BIOPSY SINGLE OR MULTIPLE;  Surgeon: John Jacob MD;  Location:  GI     HERNIA REPAIR, UMBILICAL  2006       Social History     Tobacco Use     Smoking status: Never Smoker     Smokeless tobacco: Never Used   Substance Use Topics     Alcohol use: No     Alcohol/week: 0.0 standard drinks     Family History   Problem Relation Age of Onset     Cerebrovascular Disease Mother      C.A.D. Mother      Hypertension Mother      Alcohol/Drug Mother      Arthritis Mother      Cerebrovascular Disease Maternal Grandmother      C.A.D. Maternal Grandmother      Alcohol/Drug Maternal Grandmother      C.A.D. Father      Heart Disease Father      Hypertension Father      Alcohol/Drug Father      Allergies Father      Circulatory Father      Depression Father      Respiratory Father      Asthma Father      Alcohol/Drug Paternal Grandfather      Cerebrovascular Disease Paternal Grandfather      Depression Son      Psychotic Disorder Son         anxiety disorder     Depression Daughter      Cerebrovascular Disease Maternal Grandfather      Cerebrovascular Disease Paternal Grandmother      Diabetes Brother      Allergies Sister      Depression Sister      Gynecology Sister            Reviewed and  "updated as needed this visit by Provider         Review of Systems   ROS COMP: Constitutional, HEENT, cardiovascular, pulmonary, gi and gu systems are negative, except as otherwise noted.      Objective    BP (!) 145/97   Pulse 95   Temp 98  F (36.7  C) (Oral)   Resp 20   Ht 1.702 m (5' 7\")   Wt 119.9 kg (264 lb 4.8 oz)   SpO2 94%   BMI 41.40 kg/m    Body mass index is 41.4 kg/m .  Physical Exam   GENERAL: healthy, alert and no distress  RESP: lungs clear to auscultation - no rales, rhonchi or wheezes  CV: regular rate and rhythm, normal S1 S2, no S3 or S4, no murmur, click or rub, no peripheral edema and peripheral pulses strong  SKIN: right heal ulcer:  fluctuant ulcer with surrounding erythema. subcutaneous tissue exposed. 2 cm x 1.5 cm. +1 swelling of the right lower extremity.       Diagnostic Test Results:  Labs reviewed in Epic        Assessment & Plan       ICD-10-CM    1. Localized edema R60.0 hydrochlorothiazide (HYDRODIURIL) 12.5 MG tablet   2. Type 2 diabetes mellitus with other specified complication, without long-term current use of insulin (H) E11.69 glipiZIDE (GLUCOTROL XL) 2.5 MG 24 hr tablet   3. Cellulitis of right heel L03.115 WOUND CARE REFERRAL     sulfamethoxazole-trimethoprim (BACTRIM DS/SEPTRA DS) 800-160 MG tablet     Transparent Dressings ([place] DRESSING 4\"X4) PADS   4. Diabetic peripheral vascular disorder (H) E11.51 hydrochlorothiazide (HYDRODIURIL) 12.5 MG tablet     Transparent Dressings ([place] DRESSING 4\"X4) PADS     Started Abx for diabetic foot ulcer. Advised to schedule an appointment at the wound clinic. Dressings sent to patient's pharmacy.     Restarted Glipizide at 2.5mg.   Restarted hydrochlorothiazide for swelling and HTN.    Return in about 1 week (around 9/30/2019) for Follow-up visit for wound re-check.    Vince Henry MD  [facility]    "

## 2019-09-24 RX ORDER — METFORMIN HCL 500 MG
1000 TABLET, EXTENDED RELEASE 24 HR ORAL 2 TIMES DAILY WITH MEALS
Qty: 360 TABLET | Refills: 0 | Status: SHIPPED | OUTPATIENT
Start: 2019-09-24 | End: 2020-01-09

## 2019-09-24 NOTE — TELEPHONE ENCOUNTER
Prescription approved per [facility] [place] [place] Refill Protocol.

## 2019-09-27 ENCOUNTER — HOSPITAL ENCOUNTER (OUTPATIENT)
Dept: WOUND CARE | Facility: CLINIC | Age: 64
Discharge: HOME | End: 2019-09-27
Attending: PODIATRIST | Admitting: PODIATRIST
Payer: COMMERCIAL

## 2019-09-27 ENCOUNTER — TELEPHONE (OUTPATIENT)
Dept: FAMILY MEDICINE | Facility: CLINIC | Age: 64
End: 2019-09-27

## 2019-09-27 VITALS
RESPIRATION RATE: 20 BRPM | SYSTOLIC BLOOD PRESSURE: 167 MMHG | DIASTOLIC BLOOD PRESSURE: 93 MMHG | TEMPERATURE: 95.7 F | HEART RATE: 94 BPM

## 2019-09-27 DIAGNOSIS — G63 POLYNEUROPATHY ASSOCIATED WITH UNDERLYING DISEASE (H): ICD-10-CM

## 2019-09-27 DIAGNOSIS — E66.01 MORBID OBESITY (H): ICD-10-CM

## 2019-09-27 DIAGNOSIS — L03.115 CELLULITIS OF RIGHT HEEL: ICD-10-CM

## 2019-09-27 DIAGNOSIS — L97.412 ULCER OF HEEL AND MIDFOOT, RIGHT, WITH FAT LAYER EXPOSED (H): Primary | ICD-10-CM

## 2019-09-27 DIAGNOSIS — E11.42 DIABETIC POLYNEUROPATHY ASSOCIATED WITH TYPE 2 DIABETES MELLITUS (H): ICD-10-CM

## 2019-09-27 DIAGNOSIS — I89.0 ACQUIRED LYMPHEDEMA: ICD-10-CM

## 2019-09-27 PROCEDURE — G0463 HOSPITAL OUTPT CLINIC VISIT: HCPCS | Mod: 25

## 2019-09-27 PROCEDURE — 11042 DBRDMT SUBQ TIS 1ST 20SQCM/<: CPT | Performed by: PODIATRIST

## 2019-09-27 PROCEDURE — 11042 DBRDMT SUBQ TIS 1ST 20SQCM/<: CPT

## 2019-09-27 PROCEDURE — 99203 OFFICE O/P NEW LOW 30 MIN: CPT | Mod: 25 | Performed by: PODIATRIST

## 2019-09-27 PROCEDURE — A6210 FOAM DRG >16<=48 SQ IN W/O B: HCPCS

## 2019-09-27 NOTE — TELEPHONE ENCOUNTER
FYI~ A refill has been made to the  assistance program for Lyrica.    A 90 day supply of Lyrica has been delivered to Erickson's home.    Thank you,    Merle Morales  Prescription Assistance

## 2019-09-27 NOTE — ADDENDUM NOTE
Encounter addended by: Alvina Torres RN on: 9/27/2019 12:35 PM   Actions taken: Charge Capture section accepted, Visit Navigator Flowsheet section accepted

## 2019-09-27 NOTE — PROGRESS NOTES
[facility] [place] Progress Note    Subject: Patient was seen at wound center.  Patient is new to the wound Center. He knows that he saw a callus to the back of his right heel approximately 3 weeks ago. Has been picking at it. Denies fever, nausea, vomiting. He is diabetic and notes that his blood sugar yesterday was 177 but his last A1c was 5.6 hhe notes that he does have lymphedema in used to have the leg wraps but just use his compression socks at this time. Has not used the compression socks for the last weekJust has been putting bacitracin on the wound.    PMH:   Past Medical History:   Diagnosis Date     Allergic state      Amyloidosis (H)      Diabetes mellitus (H)     type 2     Headache(784.0)      Hypertension 2007     Myalgia and myositis, unspecified      Obsessive-compulsive disorders      Other acquired absence of organ 94     Other specified viral warts      Pain in the abdomen      Pure hypercholesterolemia      Sleep apnea        Social Hx:   Social History     Socioeconomic History     Marital status:      Spouse name: Not on file     Number of children: 3     Years of education: Not on file     Highest education level: Not on file   Occupational History     Employer: "[employer]   Social Needs     Financial resource strain: Not on file     Food insecurity:     Worry: Not on file     Inability: Not on file     Transportation needs:     Medical: Not on file     Non-medical: Not on file   Tobacco Use     Smoking status: Never Smoker     Smokeless tobacco: Never Used   Substance and Sexual Activity     Alcohol use: No     Alcohol/week: 0.0 standard drinks     Drug use: No     Sexual activity: Never   Lifestyle     Physical activity:     Days per week: Not on file     Minutes per session: Not on file     Stress: Not on file   Relationships     Social connections:     Talks on phone: Not on file     Gets together: Not on file     Attends Synagogue service: Not on file      Active member of club or organization: Not on file     Attends meetings of clubs or organizations: Not on file     Relationship status: Not on file     Intimate partner violence:     Fear of current or ex partner: Not on file     Emotionally abused: Not on file     Physically abused: Not on file     Forced sexual activity: Not on file   Other Topics Concern     Parent/sibling w/ CABG, MI or angioplasty before 65F 55M? No   Social History Narrative    Social Documentation:05/27/2010        Balanced Diet: NO    Calcium intake: 3-4 per day    Caffeine: 2 per day    Exercise:  type of activity NO    Sunscreen: Yes    Seatbelts:  Yes    Self Breast Exam:  No - na    Self Testicular Exam: no    Physical/Emotional/Sexual Abuse: No     Do you feel safe in your environment? Yes        Cholesterol screen up to date: Yes    Eye Exam up to date: Yes    Dental Exam up to date: Yes    Pap smear up to date: Does Not Apply    Mammogram up to date: Does Not Apply    Dexa Scan up to date:NO    Colonoscopy up to date:2007    Immunizations up to date: YES    Glucose screen if over 40: Yes        Sofi Rosas CMA                   Surgical Hx:   Past Surgical History:   Procedure Laterality Date     BMT PROTOCOL      for systemic amyloidosis     BONE MARROW BIOPSY, BONE SPECIMEN, NEEDLE/TROCAR N/A 6/8/2016    Procedure: BIOPSY BONE MARROW;  Surgeon: Nathan Agrawal MD;  Location:  GI     BONE MARROW BIOPSY, BONE SPECIMEN, NEEDLE/TROCAR N/A 2/20/2019    Procedure: BIOPSY BONE MARROW;  Surgeon: Michael Raygoza MD;  Location:  GI     C NONSPECIFIC PROCEDURE  94    Cholecystectomy     C NONSPECIFIC PROCEDURE  2000    repair deviated septum     CHOLECYSTECTOMY  1995    lap qian     COLONOSCOPY  2012    hx polyps     ESOPHAGOSCOPY, GASTROSCOPY, DUODENOSCOPY (EGD), COMBINED N/A 5/29/2015    Procedure: COMBINED ESOPHAGOSCOPY, GASTROSCOPY, DUODENOSCOPY (EGD), BIOPSY SINGLE OR MULTIPLE;  Surgeon: John Jacob,  "MD;  Location:  GI     HERNIA REPAIR, UMBILICAL  2006       Allergies:    Allergies   Allergen Reactions     Cephalexin Diarrhea     Liraglutide Other (See Comments)     Sulfa Drugs Swelling     Pt has taken  Taken sulfa drugs orally without trouble. He had problems with Sulfa eye drops. Eye swelled up     Victoza      Increased migraine frequency and severity       Medications:   Current Outpatient Medications   Medication     aspirin (ASPIRIN LOW DOSE) 81 MG tablet     atorvastatin (LIPITOR) 10 MG tablet     blood glucose (ACCU-CHEK COMPACT DRUM) test strip     blood glucose (ACCU-CHEK MULTICLIX) lancing device     blood glucose monitoring (ACCU-CHEK COMPACT CARE KIT) meter device kit     blood glucose monitoring (SOFTCLIX) lancets     divalproex (DEPAKOTE) 500 MG EC tablet     doxepin (SINEQUAN) 25 MG capsule     glipiZIDE (GLUCOTROL XL) 2.5 MG 24 hr tablet     hydrochlorothiazide (HYDRODIURIL) 12.5 MG tablet     ketoconazole (NIZORAL) 2 % external shampoo     lurasidone (LATUDA) 40 MG TABS tablet     metFORMIN (GLUCOPHAGE-XR) 500 MG 24 hr tablet     modafinil (PROVIGIL) 200 MG tablet     NONFORMULARY     pregabalin (LYRICA) 100 MG capsule     sulfamethoxazole-trimethoprim (BACTRIM DS/SEPTRA DS) 800-160 MG tablet     tamsulosin (FLOMAX) 0.4 MG capsule     Transparent Dressings ([facility] DRESSING 4\"X4) PADS     No current facility-administered medications for this encounter.          Objective:  Vitals:  BP (!) 167/93   Pulse 94   Temp 95.7  F (35.4  C) (Temporal)   Resp 20     A1C: 5.6 per patient    General:  Patient is alert and orientated.  NAD     Dermatologic: ight heel ulceration after debridement measures 4.3 cm x 3.2 cm x 0.2 cm. No surrounding erythema, or purulent drainage or malodor noted.     Vascular: DP & PT pulses are intact & regular bilaterally.  significant edema but no varicosities noted.  CFT and skin temperature is normal to both lower extremities.     Neurologic: Lower extremity " sensation is diminished.     Musculoskeletal: Patient is ambulatory without assistive device or brace.  No gross ankle deformity noted.  No foot or ankle joint effusion is noted.    Assessment:    Acquired lymphedema  Diabetic polyneuropathy associated with type 2 diabetes mellitus (H)  Morbid obesity (H)  Ulcer of heel and midfoot, right, with fat layer exposed (H)  Polyneuropathy associated with underlying disease (H)    Plan:  Wound was debrided at this time. We discussed swelling control and offloading of the ulceration. Recommend a  Heel suspension boot while in bed and a Darco wedge shoe or PRAFO boot while he is up walking to take pressure off of the area. Also recommend compression to the legs. He notes that he has compression socks and is going to wear those.At this time we will do iodoform dressing changes to the wound daily to every other day depending on the drainage. We will have him follow up in 1 month for reassessment. Sulfa caused further questions or concerns.    Procedure:  After verbal consent, per protocol lidocaine was applied to the wound. Excisional debridement was performed on ulcer.  #15 blade was used to debride ulcer down to and including subcutaneous tissue. Bleeding controlled with light pressure.  No drainage noted.     < 20sq cm debrided.  Dry dressing applied to foot.  Patient tolerated procedure well.      Hyacinth Orta DPM, Podiatry/Foot and Ankle Surgery

## 2019-09-27 NOTE — PROGRESS NOTES
Patient arrived for wound care visit. Certified Wound Care Nurse time spent evaluating patient record, completed a full evaluation and documented wound(s) & cyndy-wound skin; provided recommendation based on treatment plan. Applied dressing, reviewed discharge instructions, patient education, and discussed plan of care with appropriate medical team staff members and patient and/or family members.

## 2019-09-27 NOTE — DISCHARGE INSTRUCTIONS
[facility]  [address] [facility] [address], [place] [address]  Appointment Phone [phone] Nurse Advisors [phone]    Parmjit Hernández      1955    Wound Dressing Change: Right posterior heel   Cleanse wound and surrounding skin with: soap and water  Cover wound with  iodofoam cut to fit the wound bed, wrap with gauze and tape  Change dressing mwf  Wear offloading prafo boot at night  Wear your compression socks during the day  Take them off at night     LYN CostaP.MARQUISE. September 27, 2019    Call us at [phone] if you have any questions about your wounds, have redness or swelling around your wound, have a fever of 101 or greater or if you have any other problems or concerns. We answer the phone Monday through Friday 8 am to 4 pm, please leave a message as we check the voicemail frequently throughout the day.       ALVIN protein Drinks 2 x daily  A diet high in protein is important for wound healing, we recommend getting  grams of protein per day. Taking protein shakes or bars are a good way to get extra protein in your diet.     Good sources of protein:  Pork 26g per 3 oz  Whey protein powder - 24g per scoop (on average)  Greek yogurt - 23g per 8oz   Chicken or Turkey - 23g per 3oz  Fish - 20-25g per 3oz  Beef - 18-23g per 3oz  Navy beans - 20g per cup  Cottage cheese - 14g per 1/2 cup   Lentils - 13g per 1/4 cup  Beef jerky 13g per 1oz  2% milk - 8g per cup  Peanut butter - 8g per 2 tablespoons  Eggs - 6g per egg  Mixed nuts - 6g per 2oz      Follow up with Provider - 4 weeks

## 2019-09-28 ENCOUNTER — HEALTH MAINTENANCE LETTER (OUTPATIENT)
Age: 64
End: 2019-09-28

## 2019-10-07 ENCOUNTER — OFFICE VISIT (OUTPATIENT)
Dept: FAMILY MEDICINE | Facility: CLINIC | Age: 64
End: 2019-10-07
Payer: COMMERCIAL

## 2019-10-07 VITALS
RESPIRATION RATE: 16 BRPM | OXYGEN SATURATION: 98 % | BODY MASS INDEX: 42.69 KG/M2 | DIASTOLIC BLOOD PRESSURE: 86 MMHG | WEIGHT: 272 LBS | HEIGHT: 67 IN | TEMPERATURE: 97.6 F | HEART RATE: 87 BPM | SYSTOLIC BLOOD PRESSURE: 133 MMHG

## 2019-10-07 DIAGNOSIS — Z23 ENCOUNTER FOR IMMUNIZATION: Primary | ICD-10-CM

## 2019-10-07 DIAGNOSIS — F31.9 BIPOLAR I DISORDER (H): ICD-10-CM

## 2019-10-07 DIAGNOSIS — E11.621 DIABETIC ULCER OF HEEL ASSOCIATED WITH TYPE 2 DIABETES MELLITUS, WITH FAT LAYER EXPOSED, UNSPECIFIED LATERALITY (H): ICD-10-CM

## 2019-10-07 DIAGNOSIS — L97.402 DIABETIC ULCER OF HEEL ASSOCIATED WITH TYPE 2 DIABETES MELLITUS, WITH FAT LAYER EXPOSED, UNSPECIFIED LATERALITY (H): ICD-10-CM

## 2019-10-07 LAB — VALPROATE SERPL-MCNC: 60 MG/L (ref 50–100)

## 2019-10-07 PROCEDURE — G0008 ADMIN INFLUENZA VIRUS VAC: HCPCS | Performed by: FAMILY MEDICINE

## 2019-10-07 PROCEDURE — 80164 ASSAY DIPROPYLACETIC ACD TOT: CPT | Performed by: FAMILY MEDICINE

## 2019-10-07 PROCEDURE — G0010 ADMIN HEPATITIS B VACCINE: HCPCS | Performed by: FAMILY MEDICINE

## 2019-10-07 PROCEDURE — 90746 HEPB VACCINE 3 DOSE ADULT IM: CPT | Performed by: FAMILY MEDICINE

## 2019-10-07 PROCEDURE — 90682 RIV4 VACC RECOMBINANT DNA IM: CPT | Performed by: FAMILY MEDICINE

## 2019-10-07 PROCEDURE — 90734 MENACWYD/MENACWYCRM VACC IM: CPT | Performed by: FAMILY MEDICINE

## 2019-10-07 PROCEDURE — 90472 IMMUNIZATION ADMIN EACH ADD: CPT | Performed by: FAMILY MEDICINE

## 2019-10-07 PROCEDURE — 90715 TDAP VACCINE 7 YRS/> IM: CPT | Performed by: FAMILY MEDICINE

## 2019-10-07 PROCEDURE — G0009 ADMIN PNEUMOCOCCAL VACCINE: HCPCS | Performed by: FAMILY MEDICINE

## 2019-10-07 PROCEDURE — 90648 HIB PRP-T VACCINE 4 DOSE IM: CPT | Performed by: FAMILY MEDICINE

## 2019-10-07 PROCEDURE — 99213 OFFICE O/P EST LOW 20 MIN: CPT | Performed by: FAMILY MEDICINE

## 2019-10-07 PROCEDURE — 90670 PCV13 VACCINE IM: CPT | Performed by: FAMILY MEDICINE

## 2019-10-07 PROCEDURE — 36415 COLL VENOUS BLD VENIPUNCTURE: CPT | Performed by: FAMILY MEDICINE

## 2019-10-07 ASSESSMENT — MIFFLIN-ST. JEOR: SCORE: 1987.41

## 2019-10-07 NOTE — PROGRESS NOTES
Subjective     Parmjit Hernández is a 64 year old male who presents to clinic today for the following health issues:    HPI   Patient states he needs his depakote levels checked and also continue with vaccine schedule.    The patient was seen by his hematologist at the [facility]. He was informed that he needs a new set of childhood immunizations.  He was advised return for a follow-up visit in 3-5 years.    Chronic fatigue is stable. He is taking modafinil 200 mg TWICE DAILY.     Chronic ulcer in the left lower extremity is being followed by our wound care team.     Patient Active Problem List   Diagnosis     Obstructive sleep apnea     HYPERLIPIDEMIA LDL GOAL <100     Hypertension goal BP (blood pressure) < 140/90     Advanced directives, counseling/discussion     Migraine     History of diverticulitis     MENTAL HEALTH     Marianne (H)     Bipolar I disorder (H)     Chronic abdominal pain     Anxiety     Other amyloidosis (H)     Insomnia due to medical condition     Narcotic dependence (H)     Obstructive sleep apnea syndrome     Type 2 diabetes mellitus without complication, without long-term current use of insulin (H)     Restless legs syndrome (RLS)     Type 2 diabetes mellitus with other specified complication (H)     Peripheral edema     Morbid obesity (H)     Stasis dermatitis of both legs     Polyneuropathy associated with underlying disease (H)     Acquired lymphedema     Renal failure     Low blood pressure reading     Past Surgical History:   Procedure Laterality Date     BMT PROTOCOL      for systemic amyloidosis     BONE MARROW BIOPSY, BONE SPECIMEN, NEEDLE/TROCAR N/A 6/8/2016    Procedure: BIOPSY BONE MARROW;  Surgeon: Nathan Agrawal MD;  Location:  GI     BONE MARROW BIOPSY, BONE SPECIMEN, NEEDLE/TROCAR N/A 2/20/2019    Procedure: BIOPSY BONE MARROW;  Surgeon: Michael Raygoza MD;  Location:  GI     C NONSPECIFIC PROCEDURE  94    Cholecystectomy     C NONSPECIFIC PROCEDURE  2000  "   repair deviated septum     CHOLECYSTECTOMY  1995    lap qian     COLONOSCOPY  2012    hx polyps     ESOPHAGOSCOPY, GASTROSCOPY, DUODENOSCOPY (EGD), COMBINED N/A 5/29/2015    Procedure: COMBINED ESOPHAGOSCOPY, GASTROSCOPY, DUODENOSCOPY (EGD), BIOPSY SINGLE OR MULTIPLE;  Surgeon: John Jacob MD;  Location: [facility]     HERNIA REPAIR, UMBILICAL  2006       Social History     Tobacco Use     Smoking status: Never Smoker     Smokeless tobacco: Never Used   Substance Use Topics     Alcohol use: No     Alcohol/week: 0.0 standard drinks     Family History   Problem Relation Age of Onset     Cerebrovascular Disease Mother      C.A.D. Mother      Hypertension Mother      Alcohol/Drug Mother      Arthritis Mother      Cerebrovascular Disease Maternal Grandmother      C.A.D. Maternal Grandmother      Alcohol/Drug Maternal Grandmother      C.A.D. Father      Heart Disease Father      Hypertension Father      Alcohol/Drug Father      Allergies Father      Circulatory Father      Depression Father      Respiratory Father      Asthma Father      Alcohol/Drug Paternal Grandfather      Cerebrovascular Disease Paternal Grandfather      Depression Son      Psychotic Disorder Son         anxiety disorder     Depression Daughter      Cerebrovascular Disease Maternal Grandfather      Cerebrovascular Disease Paternal Grandmother      Diabetes Brother      Allergies Sister      Depression Sister      Gynecology Sister            Reviewed and updated as needed this visit by Provider         Review of Systems   ROS COMP: Constitutional, HEENT, cardiovascular, pulmonary, gi and gu systems are negative, except as otherwise noted.      Objective    /86   Pulse 87   Temp 97.6  F (36.4  C) (Oral)   Resp 16   Ht 1.702 m (5' 7\")   Wt 123.4 kg (272 lb)   SpO2 98%   BMI 42.60 kg/m    Body mass index is 42.6 kg/m .  Physical Exam   GENERAL: healthy, alert and no distress  NECK: no adenopathy, no asymmetry, masses, or scars and " thyroid normal to palpation  RESP: lungs clear to auscultation - no rales, rhonchi or wheezes  CV: regular rate and rhythm, normal S1 S2, no S3 or S4, no murmur, click or rub, no peripheral edema and peripheral pulses strong  ABDOMEN: soft, nontender, no hepatosplenomegaly, no masses and bowel sounds normal  MS: no gross musculoskeletal defects noted, no edema    Diagnostic Test Results:  Labs reviewed in Epic        Assessment & Plan       ICD-10-CM    1. Encounter for immunization Z23 HIB, PRP-T, ACTHIB, IM     MCV4, MENINGOCOCCAL CONJ, IM (9 MO - 55 YRS) - Menactra     PCV13, IM (6+ WK) - [id number]     DTAP - IPV, IM (4 - 6 YRS) - Kinrix/Quadracel     FLU VAC, QUADRIVALENT (RIV4) RECOMBINANT DNA, IM     ADMIN 1st VACCINE     EA ADD'L VACCINE     EA ADD'L VACCINE     EA ADD'L VACCINE     EA ADD'L VACCINE     EA ADD'L VACCINE     EA ADD'L VACCINE     HEPATITIS B VAC;ADULT     HEPATITIS B VACCINE, ADULT, IM   2. Bipolar I disorder (H) F31.9 Valproic acid   3. Diabetic ulcer of heel associated with type 2 diabetes mellitus, with fat layer exposed, unspecified laterality (H) E11.621     L97.402    continue with wound care at the wound clinic.     Return in about 2 months (around 12/7/2019) for immunizations.    Vince Henry MD  [facility]

## 2019-10-07 NOTE — NURSING NOTE
"Chief Complaint   Patient presents with     RECHECK     Patient states he needs to get his depakote levels checked and also continue with vaccination schedule      /86   Pulse 87   Temp 97.6  F (36.4  C) (Oral)   Resp 16   Ht 1.702 m (5' 7\")   Wt 123.4 kg (272 lb)   SpO2 98%   BMI 42.60 kg/m   Estimated body mass index is 42.6 kg/m  as calculated from the following:    Height as of this encounter: 1.702 m (5' 7\").    Weight as of this encounter: 123.4 kg (272 lb).  bp completed using cuff size: large       Health Maintenance addressed:  NONE    n/a    Rebecca Elise CMA, MA     "

## 2019-11-01 ENCOUNTER — HOSPITAL ENCOUNTER (OUTPATIENT)
Dept: WOUND CARE | Facility: CLINIC | Age: 64
Discharge: HOME | End: 2019-11-01
Attending: PODIATRIST | Admitting: PODIATRIST
Payer: COMMERCIAL

## 2019-11-01 VITALS
RESPIRATION RATE: 18 BRPM | SYSTOLIC BLOOD PRESSURE: 131 MMHG | HEART RATE: 93 BPM | DIASTOLIC BLOOD PRESSURE: 72 MMHG | TEMPERATURE: 96.6 F

## 2019-11-01 DIAGNOSIS — L97.412 ULCER OF HEEL AND MIDFOOT, RIGHT, WITH FAT LAYER EXPOSED (H): ICD-10-CM

## 2019-11-01 DIAGNOSIS — E66.01 MORBID OBESITY (H): ICD-10-CM

## 2019-11-01 DIAGNOSIS — E11.9 TYPE 2 DIABETES MELLITUS WITHOUT COMPLICATION, WITHOUT LONG-TERM CURRENT USE OF INSULIN (H): ICD-10-CM

## 2019-11-01 DIAGNOSIS — R60.0 PERIPHERAL EDEMA: ICD-10-CM

## 2019-11-01 DIAGNOSIS — G63 POLYNEUROPATHY ASSOCIATED WITH UNDERLYING DISEASE (H): ICD-10-CM

## 2019-11-01 PROCEDURE — 11042 DBRDMT SUBQ TIS 1ST 20SQCM/<: CPT

## 2019-11-01 PROCEDURE — 11042 DBRDMT SUBQ TIS 1ST 20SQCM/<: CPT | Performed by: PODIATRIST

## 2019-11-01 NOTE — PROGRESS NOTES
[facility] [place] Progress Note    Subject: Patient was seen at wound center.  Here for follow-up on right heel ulceration. Notes that he does not wear his compression. Has been wearing the offloading shoe. Denies fever, nausea, vomiting.has been doing iodofoam dressing changes.    PMH:   Past Medical History:   Diagnosis Date     Allergic state      Amyloidosis (H)      Diabetes mellitus (H)     type 2     Headache(784.0)      Hypertension 2007     Myalgia and myositis, unspecified      Obsessive-compulsive disorders      Other acquired absence of organ 94     Other specified viral warts      Pain in the abdomen      Pure hypercholesterolemia      Sleep apnea        Social Hx:   Social History     Socioeconomic History     Marital status:      Spouse name: Not on file     Number of children: 3     Years of education: Not on file     Highest education level: Not on file   Occupational History     Employer: [employer]   Social Needs     Financial resource strain: Not on file     Food insecurity:     Worry: Not on file     Inability: Not on file     Transportation needs:     Medical: Not on file     Non-medical: Not on file   Tobacco Use     Smoking status: Never Smoker     Smokeless tobacco: Never Used   Substance and Sexual Activity     Alcohol use: No     Alcohol/week: 0.0 standard drinks     Drug use: No     Sexual activity: Never   Lifestyle     Physical activity:     Days per week: Not on file     Minutes per session: Not on file     Stress: Not on file   Relationships     Social connections:     Talks on phone: Not on file     Gets together: Not on file     Attends Jain service: Not on file     Active member of club or organization: Not on file     Attends meetings of clubs or organizations: Not on file     Relationship status: Not on file     Intimate partner violence:     Fear of current or ex partner: Not on file     Emotionally abused: Not on file     Physically abused:  Not on file     Forced sexual activity: Not on file   Other Topics Concern     Parent/sibling w/ CABG, MI or angioplasty before 65F 55M? No   Social History Narrative    Social Documentation:05/27/2010        Balanced Diet: NO    Calcium intake: 3-4 per day    Caffeine: 2 per day    Exercise:  type of activity NO    Sunscreen: Yes    Seatbelts:  Yes    Self Breast Exam:  No - na    Self Testicular Exam: no    Physical/Emotional/Sexual Abuse: No     Do you feel safe in your environment? Yes        Cholesterol screen up to date: Yes    Eye Exam up to date: Yes    Dental Exam up to date: Yes    Pap smear up to date: Does Not Apply    Mammogram up to date: Does Not Apply    Dexa Scan up to date:NO    Colonoscopy up to date:2007    Immunizations up to date: YES    Glucose screen if over 40: Yes        Sofi Rosas CMA                   Surgical Hx:   Past Surgical History:   Procedure Laterality Date     BMT PROTOCOL      for systemic amyloidosis     BONE MARROW BIOPSY, BONE SPECIMEN, NEEDLE/TROCAR N/A 6/8/2016    Procedure: BIOPSY BONE MARROW;  Surgeon: Nathan Agrawal MD;  Location:  GI     BONE MARROW BIOPSY, BONE SPECIMEN, NEEDLE/TROCAR N/A 2/20/2019    Procedure: BIOPSY BONE MARROW;  Surgeon: Michael Raygoza MD;  Location:  GI     C NONSPECIFIC PROCEDURE  94    Cholecystectomy     C NONSPECIFIC PROCEDURE  2000    repair deviated septum     CHOLECYSTECTOMY  1995    lap qian     COLONOSCOPY  2012    hx polyps     ESOPHAGOSCOPY, GASTROSCOPY, DUODENOSCOPY (EGD), COMBINED N/A 5/29/2015    Procedure: COMBINED ESOPHAGOSCOPY, GASTROSCOPY, DUODENOSCOPY (EGD), BIOPSY SINGLE OR MULTIPLE;  Surgeon: John Jacob MD;  Location:  GI     HERNIA REPAIR, UMBILICAL  2006       Allergies:    Allergies   Allergen Reactions     Cephalexin Diarrhea     Liraglutide Other (See Comments)     Sulfa Drugs Swelling     Pt has taken  Taken sulfa drugs orally without trouble. He had problems with Sulfa eye  drops. Eye swelled up     Victoza      Increased migraine frequency and severity       Medications:   Current Outpatient Medications   Medication     aspirin (ASPIRIN LOW DOSE) 81 MG tablet     atorvastatin (LIPITOR) 10 MG tablet     blood glucose (ACCU-CHEK COMPACT DRUM) test strip     blood glucose (ACCU-CHEK MULTICLIX) lancing device     blood glucose monitoring (ACCU-CHEK COMPACT CARE KIT) meter device kit     blood glucose monitoring (SOFTCLIX) lancets     divalproex (DEPAKOTE) 500 MG EC tablet     doxepin (SINEQUAN) 25 MG capsule     glipiZIDE (GLUCOTROL XL) 2.5 MG 24 hr tablet     hydrochlorothiazide (HYDRODIURIL) 12.5 MG tablet     ketoconazole (NIZORAL) 2 % external shampoo     lurasidone (LATUDA) 40 MG TABS tablet     metFORMIN (GLUCOPHAGE-XR) 500 MG 24 hr tablet     modafinil (PROVIGIL) 200 MG tablet     NONFORMULARY     order for DME     order for DME     pregabalin (LYRICA) 100 MG capsule     tamsulosin (FLOMAX) 0.4 MG capsule     No current facility-administered medications for this encounter.          Objective:  Vitals:  /72   Pulse 93   Temp 96.6  F (35.9  C) (Temporal)   Resp 18      A1C: 5.6 per patient     General:  Patient is alert and orientated.  NAD      Dermatologic: ight heel ulceration after debridement measures 4.3 cm x 3.2 cm x 0.2 cm. No surrounding erythema, or purulent drainage or malodor noted.    .   Wound (used by OP [facility] only) 09/27/19 0858 Right heel diabetic/neurophathic (Active)   Length (cm) 1.5 11/1/2019  8:35 AM   Width (cm) 2 11/1/2019  8:35 AM   Depth (cm) 0.3 11/1/2019  8:35 AM   Wound (cm^2) 3 cm^2 11/1/2019  8:35 AM   Wound Volume (cm^3) 0.9 cm^3 11/1/2019  8:35 AM   Wound healing % 77.68 11/1/2019  8:35 AM   Dressing Appearance moist drainage 11/1/2019  8:35 AM   Drainage Characteristics/Odor serosanguineous 11/1/2019  8:35 AM   Drainage Amount moderate 11/1/2019  8:35 AM     Vascular: DP & PT pulses are intact & regular bilaterally.  significant edema but no  varicosities noted.  CFT and skin temperature is normal to both lower extremities.     Neurologic: Lower extremity sensation is diminished.     Musculoskeletal: Patient is ambulatory without assistive device or brace.  No gross ankle deformity noted.  No foot or ankle joint effusion is noted.     Assessment:    Acquired lymphedema  Diabetic polyneuropathy associated with type 2 diabetes mellitus (H)  Morbid obesity (H)  Ulcer of heel and midfoot, right, with fat layer exposed (H)  Polyneuropathy associated with underlying disease (H)     Plan:  Wound was debrided at this time. We again discussed swelling control and offloading of the ulceration. Continue in Darco wedge shoe to take pressure off of the area. Also recommend compression to the legs. He notes that he has compression socks and is going to wear those.At this time we will switch to iodosorb dressing changes to the wound daily to every other day depending on the drainage. We will have him follow up in 5 weeks. for reassessment. Was told to call further questions or concerns.     Procedure:  After verbal consent, per protocol lidocaine was applied to the wound. Excisional debridement was performed on ulcer.  #15 blade was used to debride ulcer down to and including subcutaneous tissue. Bleeding controlled with light pressure.  No drainage noted.     < 20sq cm debrided.  Dry dressing applied to foot.  Patient tolerated procedure well.    Hyacinth Orta DPM, Podiatry/Foot and Ankle Surgery

## 2019-11-01 NOTE — DISCHARGE INSTRUCTIONS
[facility] [place]  [address] [facility] [address], [place] [address]  Appointment Phone [phone] Nurse Advisors [phone]    Parmjit Hernández      1955    Parmjit Hernández 1955  Wound Dressing Change: Right posterior heel  Cleanse wound and surrounding skin with: soap and water  Cover wound with iodosorb coated gauze wound bed, wrap with gauze and tape  Change dressing daily    Wear your compression socks during the day    Take them off at night       WISAM Costa.P.M. November 1, 2019    Call us at [phone] if you have any questions about your wounds, have redness or swelling around your wound, have a fever of 101 or greater or if you have any other problems or concerns. We answer the phone Monday through Friday 8 am to 4 pm, please leave a message as we check the voicemail frequently throughout the day.     Follow up with Provider - 4 weeks

## 2019-11-08 ENCOUNTER — TELEPHONE (OUTPATIENT)
Dept: WOUND CARE | Facility: CLINIC | Age: 64
End: 2019-11-08

## 2019-11-08 DIAGNOSIS — L97.412 ULCER OF HEEL AND MIDFOOT, RIGHT, WITH FAT LAYER EXPOSED (H): ICD-10-CM

## 2019-11-08 DIAGNOSIS — L03.115 CELLULITIS OF RIGHT HEEL: ICD-10-CM

## 2019-11-08 NOTE — TELEPHONE ENCOUNTER
Patient needs another tube of Iodosorb within the next week.     Does not know wether an pharmacy order needs to be placed or sent to his home or if he needs to come in and pick some up from the office.

## 2019-11-11 NOTE — TELEPHONE ENCOUNTER
Attempted to reach patient to discuss iodosorb vs iodofoam. Not all insurance companies cover iodosorb so if he still has Iodofoam at home he can transition back to that. Left message for patient to call office back.

## 2019-11-26 ENCOUNTER — ALLIED HEALTH/NURSE VISIT (OUTPATIENT)
Dept: PHARMACY | Facility: CLINIC | Age: 64
End: 2019-11-26
Payer: COMMERCIAL

## 2019-11-26 VITALS — SYSTOLIC BLOOD PRESSURE: 122 MMHG | DIASTOLIC BLOOD PRESSURE: 78 MMHG | BODY MASS INDEX: 42.93 KG/M2 | WEIGHT: 274.1 LBS

## 2019-11-26 DIAGNOSIS — G63 POLYNEUROPATHY ASSOCIATED WITH UNDERLYING DISEASE (H): Primary | ICD-10-CM

## 2019-11-26 PROCEDURE — 99207 ZZC NO CHARGE LOS: CPT | Performed by: PHARMACIST

## 2019-11-26 RX ORDER — MULTIVIT WITH MINERALS/LUTEIN
1000 TABLET ORAL DAILY
COMMUNITY
End: 2023-02-23

## 2019-11-26 RX ORDER — SENNOSIDES 8.6 MG
TABLET ORAL
COMMUNITY
Start: 2019-11-26 | End: 2020-05-04

## 2019-11-26 NOTE — PROGRESS NOTES
Clinical Pharmacy Consult:                                                    Parmjit Hernández is a 64 year old male coming in for a clinical pharmacist consult.  He was referred to me from Dr. Henry.     Reason for Consult: Pt comes in today with patient assistance paperwork that needs completed by Dr. Henry.     Discussion: Pt mentions that he has been working with Merle/Barb on pt assistance program paperwork for his Lyrica.     Plan:  1. Reached out to Merle to determine [facility]. Will f/u once I hear back with next steps.   2. Pt requests call once paperwork is complete at [phone]      Minda Smith, Denia, TOSHIA, BCACP  MTM Pharmacist, [facility]

## 2019-12-02 DIAGNOSIS — E78.5 HYPERLIPIDEMIA LDL GOAL <100: ICD-10-CM

## 2019-12-02 NOTE — TELEPHONE ENCOUNTER
"ATORVASTATIN  Last Written Prescription Date:  12/10/18  Last Fill Quantity: 90,  # refills: 3   Last office visit: 10/7/2019 with prescribing provider:  YES   Future Office Visit:   Next 5 appointments (look out 90 days)    Dec 06, 2019  9:00 AM CST  Return Visit with Hyacinth Orta DPM, Podiatry/Foot and Ankle Surgery  [facility] [place] ([facility]) [address] [facility] [address]  [facility] [address] [phone]           Requested Prescriptions   Pending Prescriptions Disp Refills     atorvastatin (LIPITOR) 10 MG tablet [Pharmacy Med Name: ATORVASTATIN 10MG TABLETS] 90 tablet 0     Sig: TAKE 1 TABLET(10 MG) BY MOUTH DAILY       Statins Protocol Passed - 12/2/2019  1:59 PM        Passed - LDL on file in past 12 months     Recent Labs   Lab Test 05/13/19  1153   LDL 39             Passed - No abnormal creatine kinase in past 12 months     No lab results found.             Passed - Recent (12 mo) or future (30 days) visit within the authorizing provider's specialty     Patient has had an office visit with the authorizing provider or a provider within the authorizing providers department within the previous 12 mos or has a future within next 30 days. See \"Patient Info\" tab in inbasket, or \"Choose Columns\" in Meds & Orders section of the refill encounter.              Passed - Medication is active on med list        Passed - Patient is age 18 or older        "

## 2019-12-03 DIAGNOSIS — E11.69 TYPE 2 DIABETES MELLITUS WITH OTHER SPECIFIED COMPLICATION, WITHOUT LONG-TERM CURRENT USE OF INSULIN (H): ICD-10-CM

## 2019-12-03 NOTE — TELEPHONE ENCOUNTER
Routing refill request to provider for review/approval because:  Drug interaction warning  Stacie MAYA RN

## 2019-12-03 NOTE — TELEPHONE ENCOUNTER
Requested Prescriptions   Pending Prescriptions Disp Refills     glipiZIDE (GLUCOTROL XL) 2.5 MG 24 hr tablet  Last Written Prescription Date:  9/23/19  Last Fill Quantity: 30,  # refills: 0   Last Office Visit: 10/7/2019   Future Office Visit:    Next 5 appointments (look out 90 days)    Dec 06, 2019  9:00 AM CST  Return Visit with Hyacinth Orta DPM, Podiatry/Foot and Ankle Surgery  [facility] [place] ([facility]) [address] [facility] [address]  [facility] [address]  [phone]          30 tablet 0     Sig: Take 1 tablet (2.5 mg) by mouth daily       Sulfonylurea Agents Passed - 12/3/2019  3:20 PM        Passed - Blood pressure less than 140/90 in past 6 months     BP Readings from Last 3 Encounters:   11/26/19 122/78   11/01/19 131/72   10/07/19 133/86           Passed - Patient has documented LDL within the past 12 mos.     Recent Labs   Lab Test 05/13/19  1153   LDL 39           Passed - Patient has had a Microalbumin in the past 15 mos.     Recent Labs   Lab Test 05/13/19  1153  06/23/16   MICROALB  --   --  15.7   MICROL <5   < >  --    UMALCR Unable to calculate due to low value   < > 21    < > = values in this interval not displayed.           Passed - Patient has documented A1c within the specified period of time.     If HgbA1C is 8 or greater, it needs to be on file within the past 3 months.  If less than 8, must be on file within the past 6 months.     Recent Labs   Lab Test 07/31/19  1039   A1C 5.4           Passed - Medication is active on med list        Passed - Patient is age 18 or older        Passed - Patient has a recent creatinine (normal) within the past 12 mos.     Recent Labs   Lab Test 07/25/19 01/18/18  0859   CR 0.92   < >  --    CREAT  --   --  0.9    < > = values in this interval not displayed.           Passed - Recent (6 mo) or future (30 days) visit within the authorizing provider's specialty     Patient had office visit in the last 6 months or  "has a visit in the next 30 days with authorizing provider or within the authorizing provider's specialty.  See \"Patient Info\" tab in inbasket, or \"Choose Columns\" in Meds & Orders section of the refill encounter.              "

## 2019-12-04 RX ORDER — GLIPIZIDE 2.5 MG/1
2.5 TABLET, EXTENDED RELEASE ORAL DAILY
Qty: 30 TABLET | Refills: 0 | Status: SHIPPED | OUTPATIENT
Start: 2019-12-04 | End: 2020-01-09

## 2019-12-05 NOTE — PROGRESS NOTES
Spoke to Merle - we do not need to anything, they will reach out to patient when it's time to renew. If patient has questions, he can contact Merle at [phone]. Pt informed.     Minda Smith, GabinoD, TOSHIA, BCACP  MTM Pharmacist, [facility]

## 2019-12-06 ENCOUNTER — HOSPITAL ENCOUNTER (OUTPATIENT)
Dept: WOUND CARE | Facility: CLINIC | Age: 64
Discharge: HOME | End: 2019-12-06
Attending: PODIATRIST | Admitting: PODIATRIST
Payer: COMMERCIAL

## 2019-12-06 ENCOUNTER — MYC MEDICAL ADVICE (OUTPATIENT)
Dept: FAMILY MEDICINE | Facility: CLINIC | Age: 64
End: 2019-12-06

## 2019-12-06 VITALS
TEMPERATURE: 96.5 F | HEART RATE: 86 BPM | RESPIRATION RATE: 18 BRPM | DIASTOLIC BLOOD PRESSURE: 65 MMHG | SYSTOLIC BLOOD PRESSURE: 117 MMHG

## 2019-12-06 DIAGNOSIS — L97.412 DIABETIC ULCER OF RIGHT HEEL ASSOCIATED WITH TYPE 2 DIABETES MELLITUS, WITH FAT LAYER EXPOSED (H): ICD-10-CM

## 2019-12-06 DIAGNOSIS — I89.0 ACQUIRED LYMPHEDEMA: ICD-10-CM

## 2019-12-06 DIAGNOSIS — E11.42 DIABETIC POLYNEUROPATHY ASSOCIATED WITH TYPE 2 DIABETES MELLITUS (H): ICD-10-CM

## 2019-12-06 DIAGNOSIS — E66.01 MORBID OBESITY (H): ICD-10-CM

## 2019-12-06 DIAGNOSIS — E11.621 DIABETIC ULCER OF RIGHT HEEL ASSOCIATED WITH TYPE 2 DIABETES MELLITUS, WITH FAT LAYER EXPOSED (H): ICD-10-CM

## 2019-12-06 DIAGNOSIS — R60.0 LOCALIZED EDEMA: ICD-10-CM

## 2019-12-06 DIAGNOSIS — E11.51: ICD-10-CM

## 2019-12-06 PROCEDURE — 11042 DBRDMT SUBQ TIS 1ST 20SQCM/<: CPT | Performed by: PODIATRIST

## 2019-12-06 PROCEDURE — 11042 DBRDMT SUBQ TIS 1ST 20SQCM/<: CPT

## 2019-12-06 RX ORDER — ATORVASTATIN CALCIUM 10 MG/1
TABLET, FILM COATED ORAL
Qty: 90 TABLET | Refills: 1 | Status: SHIPPED | OUTPATIENT
Start: 2019-12-06 | End: 2020-06-23

## 2019-12-06 NOTE — TELEPHONE ENCOUNTER
"Requested Prescriptions   Pending Prescriptions Disp Refills     hydrochlorothiazide (HYDRODIURIL) 12.5 MG tablet 30 tablet 0     Sig: Take 2 tablets (25 mg) by mouth daily  Last Written Prescription Date:  9/23/19  Last Fill Quantity: 30 tab,  # refills: 0   Last office visit: 10/7/2019 with prescribing provider:  Elaine   Future Office Visit:   Next 5 appointments (look out 90 days)    Christos 10, 2020  9:00 AM CST  Return Visit with Hyacinth Orta DPM, Podiatry/Foot and Ankle Surgery  [facility] [place] ([facility]) [address]  [address]  [facility] [address]  [phone]             Diuretics (Including Combos) Protocol Passed - 12/6/2019  2:50 PM        Passed - Blood pressure under 140/90 in past 12 months     BP Readings from Last 3 Encounters:   12/06/19 117/65   11/26/19 122/78   11/01/19 131/72                 Passed - Recent (12 mo) or future (30 days) visit within the authorizing provider's specialty     Patient has had an office visit with the authorizing provider or a provider within the authorizing providers department within the previous 12 mos or has a future within next 30 days. See \"Patient Info\" tab in inbasket, or \"Choose Columns\" in Meds & Orders section of the refill encounter.              Passed - Medication is active on med list        Passed - Patient is age 18 or older        Passed - Normal serum creatinine on file in past 12 months     Recent Labs   Lab Test 07/25/19   CR 0.92              Passed - Normal serum potassium on file in past 12 months     Recent Labs   Lab Test 07/25/19   POTASSIUM 4.5                    Passed - Normal serum sodium on file in past 12 months     Recent Labs   Lab Test 05/13/19  1153                    "

## 2019-12-06 NOTE — TELEPHONE ENCOUNTER
Break in medication   Last Rx for 25mg daily   Noted on Rx patient taking differently: 12.5mg daily   Sent MyChart to katharine MAYA RN

## 2019-12-06 NOTE — PROGRESS NOTES
[facility] [place] Progress Note    Subject: Patient was seen at wound center.  Here for follow-up on right heel ulceration. Has been wearing a Darco wedge shoe. Has been doing iodosorb gel dressing changes. Notes it is working well. Denies fever, nausea, vomiting.    PMH:   Past Medical History:   Diagnosis Date     Allergic state      Amyloidosis (H)      Diabetes mellitus (H)     type 2     Headache(784.0)      Hypertension 2007     Myalgia and myositis, unspecified      Obsessive-compulsive disorders      Other acquired absence of organ 94     Other specified viral warts      Pain in the abdomen      Pure hypercholesterolemia      Sleep apnea        Social Hx:   Social History     Socioeconomic History     Marital status:      Spouse name: Not on file     Number of children: 3     Years of education: Not on file     Highest education level: Not on file   Occupational History     Employer: [employer]   Social Needs     Financial resource strain: Not on file     Food insecurity:     Worry: Not on file     Inability: Not on file     Transportation needs:     Medical: Not on file     Non-medical: Not on file   Tobacco Use     Smoking status: Never Smoker     Smokeless tobacco: Never Used   Substance and Sexual Activity     Alcohol use: No     Alcohol/week: 0.0 standard drinks     Drug use: No     Sexual activity: Never   Lifestyle     Physical activity:     Days per week: Not on file     Minutes per session: Not on file     Stress: Not on file   Relationships     Social connections:     Talks on phone: Not on file     Gets together: Not on file     Attends Quaker service: Not on file     Active member of club or organization: Not on file     Attends meetings of clubs or organizations: Not on file     Relationship status: Not on file     Intimate partner violence:     Fear of current or ex partner: Not on file     Emotionally abused: Not on file     Physically abused: Not on file      Forced sexual activity: Not on file   Other Topics Concern     Parent/sibling w/ CABG, MI or angioplasty before 65F 55M? No   Social History Narrative    Social Documentation:05/27/2010        Balanced Diet: NO    Calcium intake: 3-4 per day    Caffeine: 2 per day    Exercise:  type of activity NO    Sunscreen: Yes    Seatbelts:  Yes    Self Breast Exam:  No - na    Self Testicular Exam: no    Physical/Emotional/Sexual Abuse: No     Do you feel safe in your environment? Yes        Cholesterol screen up to date: Yes    Eye Exam up to date: Yes    Dental Exam up to date: Yes    Pap smear up to date: Does Not Apply    Mammogram up to date: Does Not Apply    Dexa Scan up to date:NO    Colonoscopy up to date:2007    Immunizations up to date: YES    Glucose screen if over 40: Yes        Sofi Rosas CMA                   Surgical Hx:   Past Surgical History:   Procedure Laterality Date     BMT PROTOCOL      for systemic amyloidosis     BONE MARROW BIOPSY, BONE SPECIMEN, NEEDLE/TROCAR N/A 6/8/2016    Procedure: BIOPSY BONE MARROW;  Surgeon: Nathan Agrawal MD;  Location:  GI     BONE MARROW BIOPSY, BONE SPECIMEN, NEEDLE/TROCAR N/A 2/20/2019    Procedure: BIOPSY BONE MARROW;  Surgeon: Michael Raygoza MD;  Location:  GI     C NONSPECIFIC PROCEDURE  94    Cholecystectomy     C NONSPECIFIC PROCEDURE  2000    repair deviated septum     CHOLECYSTECTOMY  1995    lap qian     COLONOSCOPY  2012    hx polyps     ESOPHAGOSCOPY, GASTROSCOPY, DUODENOSCOPY (EGD), COMBINED N/A 5/29/2015    Procedure: COMBINED ESOPHAGOSCOPY, GASTROSCOPY, DUODENOSCOPY (EGD), BIOPSY SINGLE OR MULTIPLE;  Surgeon: John Jacob MD;  Location:  GI     HERNIA REPAIR, UMBILICAL  2006       Allergies:    Allergies   Allergen Reactions     Cephalexin Diarrhea     Liraglutide Other (See Comments)     Sulfa Drugs Swelling     Pt has taken  Taken sulfa drugs orally without trouble. He had problems with Sulfa eye drops. Eye swelled  up     Victoza      Increased migraine frequency and severity       Medications:   Current Outpatient Medications   Medication     aspirin (ASPIRIN LOW DOSE) 81 MG tablet     atorvastatin (LIPITOR) 10 MG tablet     blood glucose (ACCU-CHEK COMPACT DRUM) test strip     blood glucose (ACCU-CHEK MULTICLIX) lancing device     blood glucose monitoring (ACCU-CHEK COMPACT CARE KIT) meter device kit     blood glucose monitoring (SOFTCLIX) lancets     divalproex (DEPAKOTE) 500 MG EC tablet     doxepin (SINEQUAN) 25 MG capsule     glipiZIDE (GLUCOTROL XL) 2.5 MG 24 hr tablet     hydrochlorothiazide (HYDRODIURIL) 12.5 MG tablet     ketoconazole (NIZORAL) 2 % external shampoo     lurasidone (LATUDA) 40 MG TABS tablet     metFORMIN (GLUCOPHAGE-XR) 500 MG 24 hr tablet     modafinil (PROVIGIL) 200 MG tablet     Multiple Vitamins-Minerals (SENIOR MULTIVITAMIN PLUS PO)     NONFORMULARY     order for DME     order for DME     pregabalin (LYRICA) 100 MG capsule     sennosides (SENOKOT) 8.6 MG tablet     tamsulosin (FLOMAX) 0.4 MG capsule     vitamin C (ASCORBIC ACID) 1000 MG TABS     No current facility-administered medications for this encounter.          Objective:  Vitals:  /65   Pulse 86   Temp 96.5  F (35.8  C) (Temporal)   Resp 18     A1C: 5.6 per patient     General:  Patient is alert and orientated.  NAD      Dermatologic: right heel ulceration.Measurements below after debridement.   No surrounding erythema, or purulent drainage or malodor noted.    .   Wound (used by OP [facility] only) 09/27/19 0858 Right heel diabetic/neurophathic;pressure injury (Active)   Length (cm) 1 12/6/2019  9:04 AM   Width (cm) 1.8 12/6/2019  9:04 AM   Depth (cm) 0.1 12/6/2019  9:04 AM   Wound (cm^2) 1.8 cm^2 12/6/2019  9:04 AM   Wound Volume (cm^3) 0.18 cm^3 12/6/2019  9:04 AM   Wound healing % 86.61 12/6/2019  9:04 AM   Dressing Appearance moist drainage 12/6/2019  9:04 AM   Drainage Characteristics/Odor serosanguineous 12/6/2019  9:04 AM    Drainage Amount moderate 12/6/2019  9:04 AM     Vascular: DP & PT pulses are intact & regular bilaterally.  significant edema but no varicosities noted.  CFT and skin temperature is normal to both lower extremities.     Neurologic: Lower extremity sensation is diminished.     Musculoskeletal: Patient is ambulatory without assistive device or brace.  No gross ankle deformity noted.  No foot or ankle joint effusion is noted.     Assessment:     Acquired lymphedema  Diabetic polyneuropathy associated with type 2 diabetes mellitus (H)  Morbid obesity (H)  Diabetic ulcer of right heel associated with type 2 diabetes mellitus, with fat layer exposed (H)    Plan:  Wound was debrided at this time. We again discussed swelling control.  Recommend compression to legs and offloading of the ulceration. Continue in Darco wedge shoe to take pressure off of the area.  He notes that he has compression socks and is going to wear those. At this time we will continue iodosorb dressing changes to the wound daily to every other day depending on the drainage. We will have him follow up in 5 weeks. for reassessment. Was told to call further questions or concerns.     Procedure:  After verbal consent, per protocol lidocaine was applied to the wound. Excisional debridement was performed on ulcer.  #15 blade was used to debride ulcer down to and including subcutaneous tissue. Bleeding controlled with light pressure.  No drainage noted.     < 20sq cm debrided.  Dry dressing applied to foot.  Patient tolerated procedure well.    Hyacinth Orta DPM, Podiatry/Foot and Ankle Surgery

## 2019-12-06 NOTE — DISCHARGE INSTRUCTIONS
[facility]  [address], [facility] [address]  Appointment Phone [phone]     Parmjit RONAL Hernández      1955    Wound Dressing Change: Right posterior heel  Cleanse wound and surrounding skin with: soap and water  Cover wound with iodosorb coated gauze wound bed, wrap with gauze and tape  Change dressing daily    Wear your compression socks during the day and take them off at night    Take them off at night  Continue with WISAM Zuñiga.P.[email or web address]. December 6, 2019    Call us at [phone] if you have any questions about your wounds, have redness or swelling around your wound, have a fever of 101 or greater or if you have any other problems or concerns. We answer the phone Monday through Friday 8 am to 4 pm, please leave a message as we check the voicemail frequently throughout the day.     Follow up with Provider - in 5 weeks

## 2019-12-09 RX ORDER — HYDROCHLOROTHIAZIDE 12.5 MG/1
25 TABLET ORAL DAILY
Qty: 180 TABLET | Refills: 0 | Status: ON HOLD | OUTPATIENT
Start: 2019-12-09 | End: 2020-04-18

## 2019-12-09 NOTE — TELEPHONE ENCOUNTER
Routing refill request to provider for review/approval because:  A break in medication  Patient confirmed he's on 25mg daily though  Please authorize if appropriate.  Thanks,  Stacie MAYA RN

## 2019-12-20 ENCOUNTER — NURSE TRIAGE (OUTPATIENT)
Dept: NURSING | Facility: CLINIC | Age: 64
End: 2019-12-20

## 2019-12-21 NOTE — TELEPHONE ENCOUNTER
Parmjit calls and says that his BP = 168/105 @ 2130 and 158/87 @ 2215. Pt. Denies any chest pain. Pt. Says that he will schedule an appointment to see his DrRoman On Monday.    Reason for Disposition    Systolic BP  >= 160 OR Diastolic >= 100    Additional Information    Negative: Difficult to awaken or acting confused (e.g., disoriented, slurred speech)    Negative: Severe difficulty breathing (e.g., struggling for each breath, speaks in single words)    Negative: [1] Weakness of the face, arm or leg on one side of the body AND [2] new onset    Negative: [1] Numbness (i.e., loss of sensation) of the face, arm or leg on one side of the body AND [2] new onset    Negative: [1] Chest pain lasts > 5 minutes AND [2] history of heart disease  (i.e., heart attack, bypass surgery, angina, angioplasty, CHF)    Negative: [1] Chest pain AND [2] took nitrogylcerin AND [3] pain was not relieved    Negative: Sounds like a life-threatening emergency to the triager    Negative: Symptom is main concern  (e.g., headache, chest pain)    Negative: Low blood pressure is main concern    Negative: [1] Systolic BP  >= 160 OR Diastolic >= 100 AND [2] cardiac or neurologic symptoms (e.g., chest pain, difficulty breathing, unsteady gait, blurred vision)    Negative: [1] Pregnant > 20 weeks AND [2] new hand or face swelling    Negative: [1] Pregnant > 20 weeks AND [2] BP Systolic BP  >= 140 OR Diastolic >= 90    Negative: [1] Systolic BP  >= 200 OR Diastolic >= 120  AND [2] having NO cardiac or neurologic symptoms    Negative: [1] Postpartum < 6 weeks AND [2] BP Systolic BP  >= 140 OR Diastolic >= 90    Negative: [1] Systolic BP  >= 180 OR Diastolic >= 110 AND [2] missed most recent dose of blood pressure medication    Negative: Systolic BP  >= 180 OR Diastolic >= 110    Negative: Ran out of BP medications    Protocols used: HIGH BLOOD PRESSURE-A-AH

## 2019-12-23 ENCOUNTER — TELEPHONE (OUTPATIENT)
Dept: WOUND CARE | Facility: CLINIC | Age: 64
End: 2019-12-23

## 2019-12-23 NOTE — TELEPHONE ENCOUNTER
Returned call, spoke with Erickson. Had questions as to if Nilesh would cause weight gain.  States has had more food cravings and has been eating more, but not enough to justify 25 lb gain in 3 months.  When asked about any fluid retention, he states his hydrochlorothiazide dose was increased recently so that may help.  Advised to watch diet an talk to Dr. Orta at his next visit 1/10/20.

## 2019-12-31 DIAGNOSIS — I10 HYPERTENSION GOAL BP (BLOOD PRESSURE) < 140/90: ICD-10-CM

## 2019-12-31 DIAGNOSIS — E78.5 HYPERLIPIDEMIA LDL GOAL <100: ICD-10-CM

## 2019-12-31 NOTE — TELEPHONE ENCOUNTER
Last Written Prescription Date:  1/25/2018  Last Fill Quantity: 30,  # refills: 3   Last office visit: 10/7/2019 with prescribing provider:  ANAT   Future Office Visit:   Next 5 appointments (look out 90 days)    Christos 10, 2020  9:00 AM CST  Return Visit with Hyacinth Orta DPM, Podiatry/Foot and Ankle Surgery  [facility] [place] ([facility]) [address]  [address] [address]  [phone]         Requested Prescriptions   Pending Prescriptions Disp Refills     aspirin (ASPIRIN LOW DOSE) 81 MG tablet 30 tablet 3     Sig: Take 1 tablet (81 mg) by mouth daily       There is no refill protocol information for this order

## 2020-01-05 ENCOUNTER — MYC MEDICAL ADVICE (OUTPATIENT)
Dept: FAMILY MEDICINE | Facility: CLINIC | Age: 65
End: 2020-01-05

## 2020-01-06 NOTE — TELEPHONE ENCOUNTER
Dear Erickson  Please come for a visit and an A1C check prior to adjusting your Glipizide.     Looking forward to see you    Vince Henry MD.

## 2020-01-07 ENCOUNTER — TELEPHONE (OUTPATIENT)
Dept: FAMILY MEDICINE | Facility: CLINIC | Age: 65
End: 2020-01-07

## 2020-01-07 DIAGNOSIS — E11.9 TYPE 2 DIABETES MELLITUS WITHOUT COMPLICATION, WITHOUT LONG-TERM CURRENT USE OF INSULIN (H): ICD-10-CM

## 2020-01-07 DIAGNOSIS — E11.69 TYPE 2 DIABETES MELLITUS WITH OTHER SPECIFIED COMPLICATION, WITHOUT LONG-TERM CURRENT USE OF INSULIN (H): ICD-10-CM

## 2020-01-07 NOTE — TELEPHONE ENCOUNTER
Reason for call:  Patient reporting a symptom    Symptom or request: hypertension    Duration (how long have symptoms been present): sometime within the last week which is when he checked it last    Have you been treated for this before? No    Additional comments: checked BP today and it was 175/110    Phone Number patient can be reached at:  Cell number on file:    Telephone Information:   Mobile [phone]     Best Time:  any    Can we leave a detailed message on this number:  YES    Call taken on 1/7/2020 at 5:57 PM by Lorena Ramesh    *transferred to triage

## 2020-01-08 RX ORDER — GLIPIZIDE 2.5 MG/1
2.5 TABLET, EXTENDED RELEASE ORAL DAILY
Start: 2020-01-08

## 2020-01-08 RX ORDER — METFORMIN HCL 500 MG
1000 TABLET, EXTENDED RELEASE 24 HR ORAL 2 TIMES DAILY WITH MEALS
Start: 2020-01-08

## 2020-01-08 NOTE — TELEPHONE ENCOUNTER
"Direct took call from pt rep    Pt's BP is 175/110    Pt states he is feeling fine  Denies dizziness  Denies chest pain  Denies vision concerns  Denies SOB  Denies acute pain  Reports a great deal of stress with work and his son  He does not have any PRN anxiety medications  States \"I don't feel like my blood pressure is high\"    He reports he was recently taken off metoprolol for hypotension    Advised pt to go to monitor BP and to go to ED if he develops any symptoms tonight or tomorrow. Advised pt to call back tomorrow if it is still elevated. He has an OV scheduled 1/9/20.    Thank you,  Dirk PICKERING RN    "

## 2020-01-08 NOTE — TELEPHONE ENCOUNTER
Will be addressed at tomorrow's OV with FS.  Wandy Pena RN      Glipizide:  Last Written Prescription Date:  12/4/2019  Last Fill Quantity: 30,  # refills: 0   Last office visit: 10/7/2019 with prescribing provider:  yes   Future Office Visit:     Metformin:   Last Written Prescription Date:  9/24/2019  Last Fill Quantity: 360,  # refills: 0   Last office visit: 10/7/2019 with prescribing provider:  yes   Future Office Visit:   Next 5 appointments (look out 90 days)    Jan 09, 2020 10:00 AM CST  MyChart Short with Vince Henry MD  [facility] ([facility]) [address] [address] [phone]   Christos 10, 2020  9:00 AM CST  Return Visit with Hyacinth Orta DPM, Podiatry/Foot and Ankle Surgery  [facility] [place] ([facility]) [address]  [address]  [facility] [address] [phone]

## 2020-01-09 ENCOUNTER — MYC MEDICAL ADVICE (OUTPATIENT)
Dept: FAMILY MEDICINE | Facility: CLINIC | Age: 65
End: 2020-01-09

## 2020-01-09 ENCOUNTER — OFFICE VISIT (OUTPATIENT)
Dept: FAMILY MEDICINE | Facility: CLINIC | Age: 65
End: 2020-01-09
Payer: COMMERCIAL

## 2020-01-09 ENCOUNTER — ANCILLARY PROCEDURE (OUTPATIENT)
Dept: GENERAL RADIOLOGY | Facility: CLINIC | Age: 65
End: 2020-01-09
Attending: FAMILY MEDICINE
Payer: COMMERCIAL

## 2020-01-09 VITALS
BODY MASS INDEX: 42.85 KG/M2 | DIASTOLIC BLOOD PRESSURE: 88 MMHG | OXYGEN SATURATION: 94 % | TEMPERATURE: 97.5 F | RESPIRATION RATE: 18 BRPM | HEIGHT: 67 IN | HEART RATE: 87 BPM | WEIGHT: 273 LBS | SYSTOLIC BLOOD PRESSURE: 148 MMHG

## 2020-01-09 DIAGNOSIS — E66.01 MORBID OBESITY (H): ICD-10-CM

## 2020-01-09 DIAGNOSIS — E11.69 TYPE 2 DIABETES MELLITUS WITH OTHER SPECIFIED COMPLICATION, WITHOUT LONG-TERM CURRENT USE OF INSULIN (H): ICD-10-CM

## 2020-01-09 DIAGNOSIS — N17.9 AKI (ACUTE KIDNEY INJURY) (H): ICD-10-CM

## 2020-01-09 DIAGNOSIS — I10 HYPERTENSION GOAL BP (BLOOD PRESSURE) < 140/90: ICD-10-CM

## 2020-01-09 DIAGNOSIS — E11.69 TYPE 2 DIABETES MELLITUS WITH OTHER SPECIFIED COMPLICATION, WITHOUT LONG-TERM CURRENT USE OF INSULIN (H): Primary | ICD-10-CM

## 2020-01-09 DIAGNOSIS — M51.369 DDD (DEGENERATIVE DISC DISEASE), LUMBAR: ICD-10-CM

## 2020-01-09 LAB
ALBUMIN SERPL-MCNC: 3.5 G/DL (ref 3.4–5)
ALP SERPL-CCNC: 76 U/L (ref 40–150)
ALT SERPL W P-5'-P-CCNC: 36 U/L (ref 0–70)
ANION GAP SERPL CALCULATED.3IONS-SCNC: 8 MMOL/L (ref 3–14)
AST SERPL W P-5'-P-CCNC: 31 U/L (ref 0–45)
BILIRUB SERPL-MCNC: 0.4 MG/DL (ref 0.2–1.3)
BUN SERPL-MCNC: 37 MG/DL (ref 7–30)
CALCIUM SERPL-MCNC: 11 MG/DL (ref 8.5–10.1)
CHLORIDE SERPL-SCNC: 100 MMOL/L (ref 94–109)
CO2 SERPL-SCNC: 28 MMOL/L (ref 20–32)
CREAT SERPL-MCNC: 1.02 MG/DL (ref 0.66–1.25)
ERYTHROCYTE [DISTWIDTH] IN BLOOD BY AUTOMATED COUNT: 14.7 % (ref 10–15)
GFR SERPL CREATININE-BSD FRML MDRD: 78 ML/MIN/{1.73_M2}
GLUCOSE SERPL-MCNC: 194 MG/DL (ref 70–99)
HBA1C MFR BLD: 7.4 % (ref 0–5.6)
HCT VFR BLD AUTO: 36.1 % (ref 40–53)
HGB BLD-MCNC: 12.2 G/DL (ref 13.3–17.7)
MCH RBC QN AUTO: 32.2 PG (ref 26.5–33)
MCHC RBC AUTO-ENTMCNC: 33.8 G/DL (ref 31.5–36.5)
MCV RBC AUTO: 95 FL (ref 78–100)
PLATELET # BLD AUTO: 148 10E9/L (ref 150–450)
POTASSIUM SERPL-SCNC: 4.1 MMOL/L (ref 3.4–5.3)
PROT SERPL-MCNC: 6.9 G/DL (ref 6.8–8.8)
RBC # BLD AUTO: 3.79 10E12/L (ref 4.4–5.9)
SODIUM SERPL-SCNC: 136 MMOL/L (ref 133–144)
WBC # BLD AUTO: 5.5 10E9/L (ref 4–11)

## 2020-01-09 PROCEDURE — 83036 HEMOGLOBIN GLYCOSYLATED A1C: CPT | Performed by: FAMILY MEDICINE

## 2020-01-09 PROCEDURE — 80053 COMPREHEN METABOLIC PANEL: CPT | Performed by: FAMILY MEDICINE

## 2020-01-09 PROCEDURE — 36415 COLL VENOUS BLD VENIPUNCTURE: CPT | Performed by: FAMILY MEDICINE

## 2020-01-09 PROCEDURE — 99214 OFFICE O/P EST MOD 30 MIN: CPT | Performed by: FAMILY MEDICINE

## 2020-01-09 PROCEDURE — 85027 COMPLETE CBC AUTOMATED: CPT | Performed by: FAMILY MEDICINE

## 2020-01-09 PROCEDURE — 72100 X-RAY EXAM L-S SPINE 2/3 VWS: CPT | Mod: FY

## 2020-01-09 RX ORDER — GLIPIZIDE 2.5 MG/1
5 TABLET, EXTENDED RELEASE ORAL DAILY
Qty: 30 TABLET | Refills: 3 | Status: SHIPPED | OUTPATIENT
Start: 2020-01-09 | End: 2020-02-24

## 2020-01-09 RX ORDER — GLIPIZIDE 2.5 MG/1
2.5 TABLET, EXTENDED RELEASE ORAL DAILY
Qty: 30 TABLET | Refills: 0 | Status: SHIPPED | OUTPATIENT
Start: 2020-01-09 | End: 2020-01-09

## 2020-01-09 RX ORDER — METFORMIN HCL 500 MG
1000 TABLET, EXTENDED RELEASE 24 HR ORAL 2 TIMES DAILY WITH MEALS
Qty: 360 TABLET | Refills: 0 | Status: ON HOLD | OUTPATIENT
Start: 2020-01-09 | End: 2020-04-18

## 2020-01-09 RX ORDER — LISINOPRIL 20 MG/1
20 TABLET ORAL DAILY
Qty: 90 TABLET | Refills: 3 | Status: ON HOLD | OUTPATIENT
Start: 2020-01-09 | End: 2020-04-18

## 2020-01-09 ASSESSMENT — MIFFLIN-ST. JEOR: SCORE: 1991.95

## 2020-01-09 NOTE — RESULT ENCOUNTER NOTE
Dear Erickson,   Here are your recent results. Your A1C is elevated at 7.4%. I would recommend increasing your Glipizide to 5mg once daily.    a nw Rx was sent to your pharmacy.     Best regards,    Vince Henry MD

## 2020-01-09 NOTE — PROGRESS NOTES
Subjective     Parmjit Hernández is a 64 year old male who presents to clinic today for the following health issues:    HPI   Diabetes Follow-up    How often are you checking your blood sugar? One time daily  What time of day are you checking your blood sugars (select all that apply)?  Before and after meals  Have you had any blood sugars above 200?  Yes pt has readings   Have you had any blood sugars below 70?  No    What symptoms do you notice when your blood sugar is low?  SOB     What concerns do you have today about your diabetes? Blood sugar is often over 200     Do you have any of these symptoms? (Select all that apply)  No numbness or tingling in feet.  No redness, sores or blisters on feet.  No complaints of excessive thirst.  No reports of blurry vision.  No significant changes to weight.     Have you had a diabetic eye exam in the last 12 months? Yes- Date of last eye exam: [facility] last year per patient     BP Readings from Last 2 Encounters:   01/10/20 98/65   01/09/20 (!) 148/88     Hemoglobin A1C (%)   Date Value   01/09/2020 7.4 (H)   07/31/2019 5.4     LDL Cholesterol Calculated (mg/dL)   Date Value   05/13/2019 39   05/16/2018 27       Diabetes Management Resources    Hypertension Follow-up      Do you check your blood pressure regularly outside of the clinic? Yes     Are you following a low salt diet? Yes    Are your blood pressures ever more than 140 on the top number (systolic) OR more   than 90 on the bottom number (diastolic), for example 140/90? Yes      How many servings of fruits and vegetables do you eat daily?  0-1    On average, how many sweetened beverages do you drink each day (Examples: soda, juice, sweet tea, etc.  Do NOT count diet or artificially sweetened beverages)?   4    How many days per week do you miss taking your medication? 0    He has been having intermittent episodes of hyperglycemia.  His Glipizide was discontinued due to hypoglycemic episodes.     Also,  his blood pressure has been elevated. His medications were adjusted by another provider because of hypotensive episodes.       Patient Active Problem List   Diagnosis     Obstructive sleep apnea     HYPERLIPIDEMIA LDL GOAL <100     Hypertension goal BP (blood pressure) < 140/90     Advanced directives, counseling/discussion     Migraine     History of diverticulitis     MENTAL HEALTH     Marianne (H)     Bipolar I disorder (H)     Chronic abdominal pain     Anxiety     Other amyloidosis (H)     Insomnia due to medical condition     Narcotic dependence (H)     Obstructive sleep apnea syndrome     Type 2 diabetes mellitus without complication, without long-term current use of insulin (H)     Restless legs syndrome (RLS)     Type 2 diabetes mellitus with other specified complication (H)     Peripheral edema     Morbid obesity (H)     Stasis dermatitis of both legs     Polyneuropathy associated with underlying disease (H)     Acquired lymphedema     Renal failure     Low blood pressure reading     Past Surgical History:   Procedure Laterality Date     BMT PROTOCOL      for systemic amyloidosis     BONE MARROW BIOPSY, BONE SPECIMEN, NEEDLE/TROCAR N/A 6/8/2016    Procedure: BIOPSY BONE MARROW;  Surgeon: Nathan Agrawal MD;  Location:  GI     BONE MARROW BIOPSY, BONE SPECIMEN, NEEDLE/TROCAR N/A 2/20/2019    Procedure: BIOPSY BONE MARROW;  Surgeon: Michael Raygoza MD;  Location:  GI     C NONSPECIFIC PROCEDURE  94    Cholecystectomy     C NONSPECIFIC PROCEDURE  2000    repair deviated septum     CHOLECYSTECTOMY  1995    lap qian     COLONOSCOPY  2012    hx polyps     ESOPHAGOSCOPY, GASTROSCOPY, DUODENOSCOPY (EGD), COMBINED N/A 5/29/2015    Procedure: COMBINED ESOPHAGOSCOPY, GASTROSCOPY, DUODENOSCOPY (EGD), BIOPSY SINGLE OR MULTIPLE;  Surgeon: John Jacob MD;  Location:  GI     HERNIA REPAIR, UMBILICAL  2006       Social History     Tobacco Use     Smoking status: Never Smoker     Smokeless  "tobacco: Never Used   Substance Use Topics     Alcohol use: No     Alcohol/week: 0.0 standard drinks     Family History   Problem Relation Age of Onset     Cerebrovascular Disease Mother      C.A.D. Mother      Hypertension Mother      Alcohol/Drug Mother      Arthritis Mother      Cerebrovascular Disease Maternal Grandmother      C.A.D. Maternal Grandmother      Alcohol/Drug Maternal Grandmother      C.A.D. Father      Heart Disease Father      Hypertension Father      Alcohol/Drug Father      Allergies Father      Circulatory Father      Depression Father      Respiratory Father      Asthma Father      Alcohol/Drug Paternal Grandfather      Cerebrovascular Disease Paternal Grandfather      Depression Son      Psychotic Disorder Son         anxiety disorder     Depression Daughter      Cerebrovascular Disease Maternal Grandfather      Cerebrovascular Disease Paternal Grandmother      Diabetes Brother      Allergies Sister      Depression Sister      Gynecology Sister            Reviewed and updated as needed this visit by Provider         Review of Systems   ROS COMP: Constitutional, HEENT, cardiovascular, pulmonary, gi and gu systems are negative, except as otherwise noted.      Objective    BP (!) 148/88   Pulse 87   Temp 97.5  F (36.4  C) (Oral)   Resp 18   Ht 1.702 m (5' 7\")   Wt 123.8 kg (273 lb)   SpO2 94%   BMI 42.76 kg/m    Body mass index is 42.76 kg/m .  Physical Exam   GENERAL: healthy, alert and no distress  RESP: lungs clear to auscultation - no rales, rhonchi or wheezes  CV: regular rate and rhythm, normal S1 S2, no S3 or S4, no murmur, click or rub, no peripheral edema and peripheral pulses strong  MS: no gross musculoskeletal defects noted, no edema    Diagnostic Test Results:  Labs reviewed in Epic  Results for orders placed or performed in visit on 01/09/20   Hemoglobin A1c     Status: Abnormal   Result Value Ref Range    Hemoglobin A1C 7.4 (H) 0 - 5.6 %   CBC with platelets     Status: " Abnormal   Result Value Ref Range    WBC 5.5 4.0 - 11.0 10e9/L    RBC Count 3.79 (L) 4.4 - 5.9 10e12/L    Hemoglobin 12.2 (L) 13.3 - 17.7 g/dL    Hematocrit 36.1 (L) 40.0 - 53.0 %    MCV 95 78 - 100 fl    MCH 32.2 26.5 - 33.0 pg    MCHC 33.8 31.5 - 36.5 g/dL    RDW 14.7 10.0 - 15.0 %    Platelet Count 148 (L) 150 - 450 10e9/L   Comprehensive metabolic panel     Status: Abnormal   Result Value Ref Range    Sodium 136 133 - 144 mmol/L    Potassium 4.1 3.4 - 5.3 mmol/L    Chloride 100 94 - 109 mmol/L    Carbon Dioxide 28 20 - 32 mmol/L    Anion Gap 8 3 - 14 mmol/L    Glucose 194 (H) 70 - 99 mg/dL    Urea Nitrogen 37 (H) 7 - 30 mg/dL    Creatinine 1.02 0.66 - 1.25 mg/dL    GFR Estimate 78 >60 mL/min/[1.73_m2]    GFR Estimate If Black 90 >60 mL/min/[1.73_m2]    Calcium 11.0 (H) 8.5 - 10.1 mg/dL    Bilirubin Total 0.4 0.2 - 1.3 mg/dL    Albumin 3.5 3.4 - 5.0 g/dL    Protein Total 6.9 6.8 - 8.8 g/dL    Alkaline Phosphatase 76 40 - 150 U/L    ALT 36 0 - 70 U/L    AST 31 0 - 45 U/L           Assessment & Plan       ICD-10-CM    1. Type 2 diabetes mellitus with other specified complication, without long-term current use of insulin (H) E11.69 metFORMIN (GLUCOPHAGE-XR) 500 MG 24 hr tablet     Hemoglobin A1c     CBC with platelets     Comprehensive metabolic panel      glipiZIDE (GLUCOTROL XL) 2.5 MG 24 hr tablet - increased the dose to 5 mg once daily   2. Hypertension goal BP (blood pressure) < 140/90 I10 lisinopril (PRINIVIL/ZESTRIL) 20 MG tablet   3. KWABENA (acute kidney injury) (H) N17.9 lisinopril (PRINIVIL/ZESTRIL) 20 MG tablet   4. DDD (degenerative disc disease), lumbar M51.36 CHIROPRACTIC REFERRAL     XR Lumbar Spine 2/3 Views   5. Morbid obesity (H) E66.01      Restarted lisinopril today.      Return in about 1 month (around 2/9/2020) for Bp recheck, glucose check .    Vince Henry MD  [facility]   meto 25mg TWICE DAILY.

## 2020-01-09 NOTE — NURSING NOTE
"Chief Complaint   Patient presents with     Diabetes     Hypertension     BP (!) 170/90   Pulse 87   Temp 97.5  F (36.4  C) (Oral)   Resp 18   Ht 1.702 m (5' 7\")   Wt 123.8 kg (273 lb)   SpO2 94%   BMI 42.76 kg/m   Estimated body mass index is 42.76 kg/m  as calculated from the following:    Height as of this encounter: 1.702 m (5' 7\").    Weight as of this encounter: 123.8 kg (273 lb).  bp completed using cuff size: large       Health Maintenance addressed:  BP was high, used pink card, recheck manually    Possibly completing today per provider review.    Rebecca Elise, [facility], [place]     "

## 2020-01-10 ENCOUNTER — HOSPITAL ENCOUNTER (OUTPATIENT)
Dept: WOUND CARE | Facility: CLINIC | Age: 65
Discharge: HOME | End: 2020-01-10
Attending: PODIATRIST | Admitting: PODIATRIST
Payer: COMMERCIAL

## 2020-01-10 ENCOUNTER — TELEPHONE (OUTPATIENT)
Dept: WOUND CARE | Facility: CLINIC | Age: 65
End: 2020-01-10

## 2020-01-10 VITALS — SYSTOLIC BLOOD PRESSURE: 98 MMHG | DIASTOLIC BLOOD PRESSURE: 65 MMHG | HEART RATE: 96 BPM | TEMPERATURE: 98 F

## 2020-01-10 DIAGNOSIS — R60.0 EDEMA OF BOTH LOWER LEGS DUE TO PERIPHERAL VENOUS INSUFFICIENCY: ICD-10-CM

## 2020-01-10 DIAGNOSIS — E11.42 DIABETIC POLYNEUROPATHY ASSOCIATED WITH TYPE 2 DIABETES MELLITUS (H): ICD-10-CM

## 2020-01-10 DIAGNOSIS — E11.42 DIABETIC POLYNEUROPATHY ASSOCIATED WITH TYPE 2 DIABETES MELLITUS (H): Primary | ICD-10-CM

## 2020-01-10 DIAGNOSIS — E11.69 TYPE 2 DIABETES MELLITUS WITH OTHER SPECIFIED COMPLICATION, WITHOUT LONG-TERM CURRENT USE OF INSULIN (H): ICD-10-CM

## 2020-01-10 DIAGNOSIS — I87.2 EDEMA OF BOTH LOWER LEGS DUE TO PERIPHERAL VENOUS INSUFFICIENCY: ICD-10-CM

## 2020-01-10 DIAGNOSIS — E66.01 MORBID OBESITY (H): ICD-10-CM

## 2020-01-10 DIAGNOSIS — L97.412 ULCER OF HEEL AND MIDFOOT, RIGHT, WITH FAT LAYER EXPOSED (H): Primary | ICD-10-CM

## 2020-01-10 DIAGNOSIS — L97.412 ULCER OF HEEL AND MIDFOOT, RIGHT, WITH FAT LAYER EXPOSED (H): ICD-10-CM

## 2020-01-10 PROCEDURE — 11042 DBRDMT SUBQ TIS 1ST 20SQCM/<: CPT

## 2020-01-10 PROCEDURE — 11042 DBRDMT SUBQ TIS 1ST 20SQCM/<: CPT | Performed by: PODIATRIST

## 2020-01-10 PROCEDURE — A6022 COLLAGEN DRSG>16<=48 SQ IN: HCPCS

## 2020-01-10 PROCEDURE — 99213 OFFICE O/P EST LOW 20 MIN: CPT | Mod: 25 | Performed by: PODIATRIST

## 2020-01-10 RX ORDER — ERGOCALCIFEROL 1.25 MG/1
50000 CAPSULE, LIQUID FILLED ORAL WEEKLY
Qty: 4 CAPSULE | Refills: 4 | Status: SHIPPED | OUTPATIENT
Start: 2020-01-10 | End: 2020-02-07

## 2020-01-10 RX ORDER — ERGOCALCIFEROL 1.25 MG/1
50000 CAPSULE, LIQUID FILLED ORAL WEEKLY
Qty: 4 CAPSULE | Refills: 4 | Status: SHIPPED | OUTPATIENT
Start: 2020-01-10 | End: 2020-01-10

## 2020-01-10 NOTE — PROGRESS NOTES
[facility] [place] Progress Note    Subject: Patient was seen at wound center.  Patient states that the right heel ulceration reopened.  Notes that it has been open for the last week.  Denies fever, nausea, vomiting.  Has been putting Iodosorb gel on it.  Denies injury.  No pain due to diabetic neuropathy.    PMH:   Past Medical History:   Diagnosis Date     Allergic state      Amyloidosis (H)      Diabetes mellitus (H)     type 2     Headache(784.0)      Hypertension 2007     Myalgia and myositis, unspecified      Obsessive-compulsive disorders      Other acquired absence of organ 94     Other specified viral warts      Pain in the abdomen      Pure hypercholesterolemia      Sleep apnea        Social Hx:   Social History     Socioeconomic History     Marital status:      Spouse name: Not on file     Number of children: 3     Years of education: Not on file     Highest education level: Not on file   Occupational History     Employer: [email or web address]   Social Needs     Financial resource strain: Not on file     Food insecurity:     Worry: Not on file     Inability: Not on file     Transportation needs:     Medical: Not on file     Non-medical: Not on file   Tobacco Use     Smoking status: Never Smoker     Smokeless tobacco: Never Used   Substance and Sexual Activity     Alcohol use: No     Alcohol/week: 0.0 standard drinks     Drug use: No     Sexual activity: Never   Lifestyle     Physical activity:     Days per week: Not on file     Minutes per session: Not on file     Stress: Not on file   Relationships     Social connections:     Talks on phone: Not on file     Gets together: Not on file     Attends Congregation service: Not on file     Active member of club or organization: Not on file     Attends meetings of clubs or organizations: Not on file     Relationship status: Not on file     Intimate partner violence:     Fear of current or ex partner: Not on file     Emotionally abused: Not on  file     Physically abused: Not on file     Forced sexual activity: Not on file   Other Topics Concern     Parent/sibling w/ CABG, MI or angioplasty before 65F 55M? No   Social History Narrative    Social Documentation:05/27/2010        Balanced Diet: NO    Calcium intake: 3-4 per day    Caffeine: 2 per day    Exercise:  type of activity NO    Sunscreen: Yes    Seatbelts:  Yes    Self Breast Exam:  No - na    Self Testicular Exam: no    Physical/Emotional/Sexual Abuse: No     Do you feel safe in your environment? Yes        Cholesterol screen up to date: Yes    Eye Exam up to date: Yes    Dental Exam up to date: Yes    Pap smear up to date: Does Not Apply    Mammogram up to date: Does Not Apply    Dexa Scan up to date:NO    Colonoscopy up to date:2007    Immunizations up to date: YES    Glucose screen if over 40: Yes        Sofi Rosas CMA                   Surgical Hx:   Past Surgical History:   Procedure Laterality Date     BMT PROTOCOL      for systemic amyloidosis     BONE MARROW BIOPSY, BONE SPECIMEN, NEEDLE/TROCAR N/A 6/8/2016    Procedure: BIOPSY BONE MARROW;  Surgeon: Nathan Agrawal MD;  Location:  GI     BONE MARROW BIOPSY, BONE SPECIMEN, NEEDLE/TROCAR N/A 2/20/2019    Procedure: BIOPSY BONE MARROW;  Surgeon: Michael Raygoza MD;  Location:  GI     C NONSPECIFIC PROCEDURE  94    Cholecystectomy     C NONSPECIFIC PROCEDURE  2000    repair deviated septum     CHOLECYSTECTOMY  1995    lap qian     COLONOSCOPY  2012    hx polyps     ESOPHAGOSCOPY, GASTROSCOPY, DUODENOSCOPY (EGD), COMBINED N/A 5/29/2015    Procedure: COMBINED ESOPHAGOSCOPY, GASTROSCOPY, DUODENOSCOPY (EGD), BIOPSY SINGLE OR MULTIPLE;  Surgeon: John Jacob MD;  Location:  GI     HERNIA REPAIR, UMBILICAL  2006       Allergies:    Allergies   Allergen Reactions     Cephalexin Diarrhea     Liraglutide Other (See Comments)     Sulfa Drugs Swelling     Pt has taken  Taken sulfa drugs orally without trouble. He  had problems with Sulfa eye drops. Eye swelled up     Victoza      Increased migraine frequency and severity       Medications:   Current Outpatient Medications   Medication     aspirin (ASPIRIN LOW DOSE) 81 MG tablet     atorvastatin (LIPITOR) 10 MG tablet     blood glucose (ACCU-CHEK MULTICLIX) lancing device     blood glucose monitoring (SOFTCLIX) lancets     divalproex (DEPAKOTE) 500 MG EC tablet     doxepin (SINEQUAN) 25 MG capsule     glipiZIDE (GLUCOTROL XL) 2.5 MG 24 hr tablet     hydrochlorothiazide (HYDRODIURIL) 12.5 MG tablet     ketoconazole (NIZORAL) 2 % external shampoo     lisinopril (PRINIVIL/ZESTRIL) 20 MG tablet     lurasidone (LATUDA) 40 MG TABS tablet     metFORMIN (GLUCOPHAGE-XR) 500 MG 24 hr tablet     modafinil (PROVIGIL) 200 MG tablet     Multiple Vitamins-Minerals (SENIOR MULTIVITAMIN PLUS PO)     NONFORMULARY     order for DME     order for DME     pregabalin (LYRICA) 100 MG capsule     sennosides (SENOKOT) 8.6 MG tablet     tamsulosin (FLOMAX) 0.4 MG capsule     vitamin C (ASCORBIC ACID) 1000 MG TABS     No current facility-administered medications for this encounter.          Objective:  Vitals:  BP 98/65   Pulse 96   Temp 98  F (36.7  C) (Temporal)     A1C: 74 (1/2020)    General:  Patient is alert and orientated.  NAD     Dermatologic: Posterior right heel ulceration.  After debridement measures 3.0 x 2.0 x 0.1 cm.  No surrounding erythema, purulent drainage or malodor noted.    .   Wound (used by OP [facility] only) 09/27/19 0858 Right heel diabetic/neurophathic;pressure injury (Active)   Length (cm) 0.5 1/10/2020  9:22 AM   Width (cm) 1.3 1/10/2020  9:22 AM   Depth (cm) 0.2 1/10/2020  9:22 AM   Wound (cm^2) 0.65 cm^2 1/10/2020  9:22 AM   Wound Volume (cm^3) 0.13 cm^3 1/10/2020  9:22 AM   Wound healing % 95.16 1/10/2020  9:22 AM   Dressing Appearance moist drainage 1/10/2020  9:22 AM   Drainage Characteristics/Odor serosanguineous 1/10/2020  9:22 AM   Drainage Amount moderate 1/10/2020   9:22 AM     Vascular: DP & PT pulses are intact & regular bilaterally.  edema and varicosities noted.  CFT and skin temperature is normal to both lower extremities.     Neurologic: Lower extremity sensation is diminished.      Musculoskeletal: Patient is ambulatory without assistive device or brace.  No gross ankle deformity noted.  No foot or ankle joint effusion is noted.    Assessment:    Diabetic polyneuropathy associated with type 2 diabetes mellitus (H)  Ulcer of heel and midfoot, right, with fat layer exposed (H)  Morbid obesity (H)  Edema of both lower legs due to peripheral venous insufficiency    Plan: At this time the heel wound was debrided.  We will have him get a heel suspension boot to wear in bed or to have 3 pillows underneath to keep pressure off of the area.  Continue to wear compression socks.  Did order vitamin D 5000 units for patient per request.  We will have him follow-up in 1 month.  He was told to call with further questions or concerns.      Procedure:  After verbal consent, per protocol lidocaine was applied to the wound. Excisional debridement was performed on ulcer.   #15 blade was used to debride ulcer down to and including subcutaneous tissue. Bleeding controlled with light pressure.  No drainage noted.   < 20sq cm debrided.  Dry dressing applied to foot.  Patient tolerated procedure well.      Hyacinth Orta DPM, Podiatry/Foot and Ankle Surgery

## 2020-01-10 NOTE — TELEPHONE ENCOUNTER
Need order for dme supplies. Order in epic, please print sign and send it to me to get faxed out.

## 2020-01-10 NOTE — TELEPHONE ENCOUNTER
See 2nd Mychart message.  Patient states he figured it out and to disregard first Mychart sent.  Wandy Pena RN

## 2020-01-10 NOTE — ADDENDUM NOTE
Encounter addended by: Alvina Torres RN on: 1/10/2020 10:18 AM   Actions taken: Edited Discharge Instructions, Charge Capture section accepted

## 2020-01-10 NOTE — DISCHARGE INSTRUCTIONS
[facility]  [address] [place] [id number] [address]    Call us at [phone] if you have any questions about your wounds, have redness or swelling around your wound, have a fever of 101 or greater or if you have any other problems or concerns. We answer the phone Monday through Friday 8 am to 4 pm, please leave a message as we check the voicemail frequently throughout the day.     Parmjit Hernández      1955    Supplements to aid in healin. 1 packet of Nilesh Supplement into your favorite beverage TWICE a day.You may purchase at [facility] outpatient pharmacy located in [facility].  2. Vitamin D3 5,000 iu per day.  3. Wear offloading boot while in bed or pillow placed vertically    Dressing cleanse wound with soap and water. Apply kevin and bandaid. Apply spandage E.   Spandage can come off at night.  Compression:   You have a compression is Spandage E is supposed to be removed at night and put back on first thing in the morning.   Please remove compression dressing if toes turn blue and/or tingle and can not be relieved by raising the leg for one hour.          WISAM Costa.P.M.. January 10, 2020    Wound Healing Clinic  Follow up with Provider -  In 1 month  [phone]

## 2020-01-10 NOTE — ADDENDUM NOTE
Encounter addended by: Hyacinth Orta DPM, Podiatry/Foot and Ankle Surgery on: 1/10/2020 12:29 PM   Actions taken: Order list changed, Diagnosis association updated

## 2020-01-10 NOTE — ADDENDUM NOTE
Encounter addended by: Alvina Torres RN on: 1/10/2020 4:48 PM   Actions taken: Flowsheet accepted, Visit diagnoses modified, Order list changed, Diagnosis association updated

## 2020-01-12 NOTE — TELEPHONE ENCOUNTER
Glipizide 2.5MG  Last Written Prescription Date:  1/09/2020  Last Fill Quantity: 30,  # refills: 3   Last office visit: 1/9/2020 with prescribing provider:  Yes    Future Office Visit:   Next 5 appointments (look out 90 days)    Feb 07, 2020 10:00 AM CST  Return Visit with Hyacinth Orta DPM, Podiatry/Foot and Ankle Surgery  [facility] [place] ([facility] [address] [place] [facility]   [facility] [address] [phone]         Requested Prescriptions   Pending Prescriptions Disp Refills     glipiZIDE (GLUCOTROL XL) 2.5 MG 24 hr tablet 30 tablet 3     Sig: Take 2 tablets (5 mg) by mouth daily       Sulfonylurea Agents Passed - 1/10/2020  3:24 PM        Passed - Blood pressure less than 140/90 in past 6 months     BP Readings from Last 3 Encounters:   01/10/20 98/65   01/09/20 (!) 148/88   12/06/19 117/65                 Passed - Patient has documented LDL within the past 12 mos.     Recent Labs   Lab Test 05/13/19  1153   LDL 39             Passed - Patient has had a Microalbumin in the past 15 mos.     Recent Labs   Lab Test 05/13/19  1153  06/23/16   MICROALB  --   --  15.7   MICROL <5   < >  --    UMALCR Unable to calculate due to low value   < > 21    < > = values in this interval not displayed.             Passed - Patient has documented A1c within the specified period of time.     If HgbA1C is 8 or greater, it needs to be on file within the past 3 months.  If less than 8, must be on file within the past 6 months.     Recent Labs   Lab Test 01/09/20  1058   A1C 7.4*             Passed - Medication is active on med list        Passed - Patient is age 18 or older        Passed - Patient has a recent creatinine (normal) within the past 12 mos.     Recent Labs   Lab Test 01/09/20  1058  01/18/18  0859   CR 1.02   < >  --    CREAT  --   --  0.9    < > = values in this interval not displayed.             Passed - Recent (6 mo) or future (30 days) visit within the authorizing  "provider's specialty     Patient had office visit in the last 6 months or has a visit in the next 30 days with authorizing provider or within the authorizing provider's specialty.  See \"Patient Info\" tab in inbasket, or \"Choose Columns\" in Meds & Orders section of the refill encounter.              "

## 2020-01-13 RX ORDER — GLIPIZIDE 2.5 MG/1
5 TABLET, EXTENDED RELEASE ORAL DAILY
Start: 2020-01-13

## 2020-01-17 ENCOUNTER — MYC MEDICAL ADVICE (OUTPATIENT)
Dept: FAMILY MEDICINE | Facility: CLINIC | Age: 65
End: 2020-01-17

## 2020-01-20 ENCOUNTER — TELEPHONE (OUTPATIENT)
Dept: PODIATRY | Facility: CLINIC | Age: 65
End: 2020-01-20

## 2020-01-20 DIAGNOSIS — L97.412 ULCER OF HEEL AND MIDFOOT, RIGHT, WITH FAT LAYER EXPOSED (H): ICD-10-CM

## 2020-01-20 NOTE — TELEPHONE ENCOUNTER
FS,  Please see below [employer] message and advise.  See attachment in [employer] from 7/31/2019 if needed  Looks lie pt due for DTaP, Hib, PCV13, and IPV, right?  Thanks,  Stacie MAYA RN

## 2020-01-20 NOTE — TELEPHONE ENCOUNTER
lvm for patient to use barrier paste around the wound and not on the wound. Okay to call back if he has additional questions.

## 2020-01-20 NOTE — TELEPHONE ENCOUNTER
Spoke to Ginny at [facility] who said she would place the order. Information and orders were sent with a request to expedite shipping.   Called patient and told him that [facility] will be sending out the supplies and I requested that she expedite the delivery.   He can come to the office and  some kevin to get you through until he gets his supply from [facility].

## 2020-01-22 ENCOUNTER — TELEPHONE (OUTPATIENT)
Dept: WOUND CARE | Facility: CLINIC | Age: 65
End: 2020-01-22

## 2020-01-22 ENCOUNTER — TELEPHONE (OUTPATIENT)
Dept: FAMILY MEDICINE | Facility: CLINIC | Age: 65
End: 2020-01-22

## 2020-01-22 ENCOUNTER — OFFICE VISIT (OUTPATIENT)
Dept: FAMILY MEDICINE | Facility: CLINIC | Age: 65
End: 2020-01-22
Payer: COMMERCIAL

## 2020-01-22 VITALS
HEIGHT: 67 IN | DIASTOLIC BLOOD PRESSURE: 75 MMHG | BODY MASS INDEX: 41.48 KG/M2 | OXYGEN SATURATION: 95 % | WEIGHT: 264.3 LBS | SYSTOLIC BLOOD PRESSURE: 112 MMHG | HEART RATE: 88 BPM | TEMPERATURE: 97.3 F

## 2020-01-22 DIAGNOSIS — Z23 ENCOUNTER FOR IMMUNIZATION: ICD-10-CM

## 2020-01-22 DIAGNOSIS — R53.82 CHRONIC FATIGUE: Primary | ICD-10-CM

## 2020-01-22 DIAGNOSIS — Z94.81 STATUS POST BONE MARROW TRANSPLANT (H): ICD-10-CM

## 2020-01-22 PROCEDURE — 90670 PCV13 VACCINE IM: CPT | Performed by: FAMILY MEDICINE

## 2020-01-22 PROCEDURE — 90648 HIB PRP-T VACCINE 4 DOSE IM: CPT | Performed by: FAMILY MEDICINE

## 2020-01-22 PROCEDURE — 99213 OFFICE O/P EST LOW 20 MIN: CPT | Mod: 25 | Performed by: FAMILY MEDICINE

## 2020-01-22 PROCEDURE — 90696 DTAP-IPV VACCINE 4-6 YRS IM: CPT | Performed by: FAMILY MEDICINE

## 2020-01-22 PROCEDURE — G0009 ADMIN PNEUMOCOCCAL VACCINE: HCPCS | Performed by: FAMILY MEDICINE

## 2020-01-22 PROCEDURE — 90472 IMMUNIZATION ADMIN EACH ADD: CPT | Performed by: FAMILY MEDICINE

## 2020-01-22 ASSESSMENT — MIFFLIN-ST. JEOR: SCORE: 1952.49

## 2020-01-22 NOTE — TELEPHONE ENCOUNTER
Patient states  He must have had the boot inside out. But now that it is right side out he states it is obvious how the straps work. He said he thinks he will be okay and if he has any questions he will call us.

## 2020-01-22 NOTE — TELEPHONE ENCOUNTER
Patient said he bought a Boot and he can not get it on properly. He will like someone to help him to wear it properly..

## 2020-01-22 NOTE — TELEPHONE ENCOUNTER
Yes this is correct. He is due for DTap, HIB, PCV 13 and IPV.     Immunizations will be given in today's visit.       Elaine

## 2020-01-22 NOTE — PROGRESS NOTES
Subjective     Parmjit Hernández is a 64 year old male who presents to clinic today for the following health issues:    HPI   Follow up chronic fatigue.  The patient was given a prescription of modafinil by his sleep medicine provider but he reports that the medication is not enough.    Also, medical history significant for pulmonary transplant.  He is currently due for immunizations.    Patient Active Problem List   Diagnosis     Obstructive sleep apnea     HYPERLIPIDEMIA LDL GOAL <100     Hypertension goal BP (blood pressure) < 140/90     Advanced directives, counseling/discussion     Migraine     History of diverticulitis     MENTAL HEALTH     Marianne (H)     Bipolar I disorder (H)     Chronic abdominal pain     Anxiety     Other amyloidosis (H)     Insomnia due to medical condition     Narcotic dependence (H)     Obstructive sleep apnea syndrome     Type 2 diabetes mellitus without complication, without long-term current use of insulin (H)     Restless legs syndrome (RLS)     Type 2 diabetes mellitus with other specified complication (H)     Peripheral edema     Morbid obesity (H)     Stasis dermatitis of both legs     Polyneuropathy associated with underlying disease (H)     Acquired lymphedema     Renal failure     Low blood pressure reading     Past Surgical History:   Procedure Laterality Date     BMT PROTOCOL      for systemic amyloidosis     BONE MARROW BIOPSY, BONE SPECIMEN, NEEDLE/TROCAR N/A 6/8/2016    Procedure: BIOPSY BONE MARROW;  Surgeon: Nathan Agrawal MD;  Location:  GI     BONE MARROW BIOPSY, BONE SPECIMEN, NEEDLE/TROCAR N/A 2/20/2019    Procedure: BIOPSY BONE MARROW;  Surgeon: Michael Raygoza MD;  Location:  GI     C NONSPECIFIC PROCEDURE  94    Cholecystectomy     C NONSPECIFIC PROCEDURE  2000    repair deviated septum     CHOLECYSTECTOMY  1995    lap qian     COLONOSCOPY  2012    hx polyps     ESOPHAGOSCOPY, GASTROSCOPY, DUODENOSCOPY (EGD), COMBINED N/A 5/29/2015     "Procedure: COMBINED ESOPHAGOSCOPY, GASTROSCOPY, DUODENOSCOPY (EGD), BIOPSY SINGLE OR MULTIPLE;  Surgeon: John Jacob MD;  Location:  GI     HERNIA REPAIR, UMBILICAL  2006       Social History     Tobacco Use     Smoking status: Never Smoker     Smokeless tobacco: Never Used   Substance Use Topics     Alcohol use: No     Alcohol/week: 0.0 standard drinks     Family History   Problem Relation Age of Onset     Cerebrovascular Disease Mother      C.A.D. Mother      Hypertension Mother      Alcohol/Drug Mother      Arthritis Mother      Cerebrovascular Disease Maternal Grandmother      C.A.D. Maternal Grandmother      Alcohol/Drug Maternal Grandmother      C.A.D. Father      Heart Disease Father      Hypertension Father      Alcohol/Drug Father      Allergies Father      Circulatory Father      Depression Father      Respiratory Father      Asthma Father      Alcohol/Drug Paternal Grandfather      Cerebrovascular Disease Paternal Grandfather      Depression Son      Psychotic Disorder Son         anxiety disorder     Depression Daughter      Cerebrovascular Disease Maternal Grandfather      Cerebrovascular Disease Paternal Grandmother      Diabetes Brother      Allergies Sister      Depression Sister      Gynecology Sister            Reviewed and updated as needed this visit by Provider         Review of Systems   ROS COMP: Constitutional, HEENT, cardiovascular, pulmonary, gi and gu systems are negative, except as otherwise noted.      Objective    /75 (BP Location: Left arm, Patient Position: Sitting, Cuff Size: Adult Large)   Pulse 88   Temp 97.3  F (36.3  C) (Oral)   Ht 1.702 m (5' 7\")   Wt 119.9 kg (264 lb 4.8 oz)   SpO2 95%   BMI 41.40 kg/m    Body mass index is 41.4 kg/m .  Physical Exam   GENERAL: healthy, alert and no distress  RESP: lungs clear to auscultation - no rales, rhonchi or wheezes  CV: regular rate and rhythm, normal S1 S2, no S3 or S4, no murmur, click or rub, no peripheral " edema and peripheral pulses strong  PSYCH: mentation appears normal, affect normal/bright    Diagnostic Test Results:  Labs reviewed in Epic        Assessment & Plan       ICD-10-CM    1. Chronic fatigue R53.82    2. Encounter for immunization Z23 HIB, PRP-T, ACTHIB, IM     PCV13, IM (6+ WK) - [id number]     DTAP - IPV, IM (4 - 6 YRS) - Kinrix/Quadracel     ADMIN 1st VACCINE     EA ADD'L VACCINE     EA ADD'L VACCINE   3. Status post bone marrow transplant (H) Z94.81 HIB, PRP-T, ACTHIB, IM     PCV13, IM (6+ WK) - [id number]     DTAP - IPV, IM (4 - 6 YRS) - Kinrix/Quadracel     ADMIN 1st VACCINE     EA ADD'L VACCINE     EA ADD'L VACCINE       We had a discussion regarding chronic fatigue. He was informed to discuss increasing the dose with his sleep medicine provider.     Return in about 3 months (around 4/22/2020) for Follow-up visit.    Vince Henry MD  [facility]

## 2020-01-22 NOTE — TELEPHONE ENCOUNTER
FYI~ January 21, 2020 I spoke with Erickson, he is in need of financial assistance for medication.     We reviewed the Pharmacy Assistance Fund $500, the Prescription Assistance Program for manfacturer brand name assistance programs, gross income, Rx insurance and Rx list.     He is over income for the Pharmacy Assistance Fund.  I will be completing  assistance re-enrollment applications for Lyrica.    Thank you,    Merle Morales  Prescription Assistance

## 2020-01-24 ENCOUNTER — MYC MEDICAL ADVICE (OUTPATIENT)
Dept: FAMILY MEDICINE | Facility: CLINIC | Age: 65
End: 2020-01-24

## 2020-01-27 ENCOUNTER — TELEPHONE (OUTPATIENT)
Dept: FAMILY MEDICINE | Facility: CLINIC | Age: 65
End: 2020-01-27

## 2020-01-27 NOTE — TELEPHONE ENCOUNTER
Forms received from [facility] for signature  Placed forms: on your desk  Forms need to be sent inter office mail  to MIRANDA CADET    Patient call? no  Copy sent to scanning? yes    Maria Elena Richmond MA

## 2020-01-31 DIAGNOSIS — G62.9 PERIPHERAL POLYNEUROPATHY: ICD-10-CM

## 2020-01-31 RX ORDER — PREGABALIN 100 MG/1
100 CAPSULE ORAL 4 TIMES DAILY
Qty: 360 CAPSULE | Refills: 3 | Status: SHIPPED | OUTPATIENT
Start: 2020-01-31 | End: 2020-07-27

## 2020-01-31 NOTE — TELEPHONE ENCOUNTER
Dr. Irma SPRINGER am in process of applying to the Lyrica assistance program through [employer].  [employer] requires a hand signed, brand name, hard copy script be submitted with their application.    Please hand sign a BRAND name, hard copy script for:    LYRICA, 100mg, 1 capsule 4 times a day, #360, 3 refills    Please send the script to me via interoffice mail [facility] Barb Navarro or via US mail  at:      [facility]   Barb Huerta   [address] [place]  [address]    Thanks so much for your help!    Barb Huerta  Prescription   Pharmacy Assistance  [phone]

## 2020-02-07 ENCOUNTER — HOSPITAL ENCOUNTER (OUTPATIENT)
Dept: WOUND CARE | Facility: CLINIC | Age: 65
Discharge: HOME | End: 2020-02-07
Attending: PODIATRIST | Admitting: PODIATRIST
Payer: COMMERCIAL

## 2020-02-07 VITALS
DIASTOLIC BLOOD PRESSURE: 54 MMHG | SYSTOLIC BLOOD PRESSURE: 91 MMHG | TEMPERATURE: 96.8 F | RESPIRATION RATE: 18 BRPM | HEART RATE: 91 BPM

## 2020-02-07 DIAGNOSIS — L97.412 ULCER OF HEEL AND MIDFOOT, RIGHT, WITH FAT LAYER EXPOSED (H): ICD-10-CM

## 2020-02-07 DIAGNOSIS — R60.0 PERIPHERAL EDEMA: ICD-10-CM

## 2020-02-07 DIAGNOSIS — E66.01 MORBID OBESITY (H): ICD-10-CM

## 2020-02-07 DIAGNOSIS — E11.42 DIABETIC POLYNEUROPATHY ASSOCIATED WITH TYPE 2 DIABETES MELLITUS (H): ICD-10-CM

## 2020-02-07 PROCEDURE — 11042 DBRDMT SUBQ TIS 1ST 20SQCM/<: CPT

## 2020-02-07 PROCEDURE — 11042 DBRDMT SUBQ TIS 1ST 20SQCM/<: CPT | Performed by: PODIATRIST

## 2020-02-07 PROCEDURE — 99213 OFFICE O/P EST LOW 20 MIN: CPT | Mod: 25 | Performed by: PODIATRIST

## 2020-02-07 NOTE — PROGRESS NOTES
[facility] Progress Note    Subject: Patient was seen at wound center.  Patient states that the right heel ulceration reopened.    Has been putting Missy on the wound daily.  Also has been wearing his offloading shoe during the day and his Prevalon boot at night.  Denies injury.  No pain due to diabetic neuropathy.  Denies fever, chills, nausea.    PMH:   Past Medical History:   Diagnosis Date     Allergic state      Amyloidosis (H)      Diabetes mellitus (H)     type 2     Headache(784.0)      Hypertension 2007     Myalgia and myositis, unspecified      Obsessive-compulsive disorders      Other acquired absence of organ 94     Other specified viral warts      Pain in the abdomen      Pure hypercholesterolemia      Sleep apnea        Social Hx:   Social History     Socioeconomic History     Marital status:      Spouse name: Not on file     Number of children: 3     Years of education: Not on file     Highest education level: Not on file   Occupational History     Employer: [employer]   Social Needs     Financial resource strain: Not on file     Food insecurity:     Worry: Not on file     Inability: Not on file     Transportation needs:     Medical: Not on file     Non-medical: Not on file   Tobacco Use     Smoking status: Never Smoker     Smokeless tobacco: Never Used   Substance and Sexual Activity     Alcohol use: No     Alcohol/week: 0.0 standard drinks     Drug use: No     Sexual activity: Never   Lifestyle     Physical activity:     Days per week: Not on file     Minutes per session: Not on file     Stress: Not on file   Relationships     Social connections:     Talks on phone: Not on file     Gets together: Not on file     Attends Rastafari service: Not on file     Active member of club or organization: Not on file     Attends meetings of clubs or organizations: Not on file     Relationship status: Not on file     Intimate partner violence:     Fear of current or ex  partner: Not on file     Emotionally abused: Not on file     Physically abused: Not on file     Forced sexual activity: Not on file   Other Topics Concern     Parent/sibling w/ CABG, MI or angioplasty before 65F 55M? No   Social History Narrative    Social Documentation:05/27/2010        Balanced Diet: NO    Calcium intake: 3-4 per day    Caffeine: 2 per day    Exercise:  type of activity NO    Sunscreen: Yes    Seatbelts:  Yes    Self Breast Exam:  No - na    Self Testicular Exam: no    Physical/Emotional/Sexual Abuse: No     Do you feel safe in your environment? Yes        Cholesterol screen up to date: Yes    Eye Exam up to date: Yes    Dental Exam up to date: Yes    Pap smear up to date: Does Not Apply    Mammogram up to date: Does Not Apply    Dexa Scan up to date:NO    Colonoscopy up to date:2007    Immunizations up to date: YES    Glucose screen if over 40: Yes        Sofi Rosas CMA                   Surgical Hx:   Past Surgical History:   Procedure Laterality Date     BMT PROTOCOL      for systemic amyloidosis     BONE MARROW BIOPSY, BONE SPECIMEN, NEEDLE/TROCAR N/A 6/8/2016    Procedure: BIOPSY BONE MARROW;  Surgeon: Nathan Agrawal MD;  Location:  GI     BONE MARROW BIOPSY, BONE SPECIMEN, NEEDLE/TROCAR N/A 2/20/2019    Procedure: BIOPSY BONE MARROW;  Surgeon: Michael Raygoza MD;  Location:  GI     C NONSPECIFIC PROCEDURE  94    Cholecystectomy     C NONSPECIFIC PROCEDURE  2000    repair deviated septum     CHOLECYSTECTOMY  1995    lap qian     COLONOSCOPY  2012    hx polyps     ESOPHAGOSCOPY, GASTROSCOPY, DUODENOSCOPY (EGD), COMBINED N/A 5/29/2015    Procedure: COMBINED ESOPHAGOSCOPY, GASTROSCOPY, DUODENOSCOPY (EGD), BIOPSY SINGLE OR MULTIPLE;  Surgeon: John Jacob MD;  Location:  GI     HERNIA REPAIR, UMBILICAL  2006       Allergies:    Allergies   Allergen Reactions     Cephalexin Diarrhea     Liraglutide Other (See Comments)     Sulfa Drugs Swelling     Pt has  taken  Taken sulfa drugs orally without trouble. He had problems with Sulfa eye drops. Eye swelled up     Victoza      Increased migraine frequency and severity       Medications:   Current Outpatient Medications   Medication     aspirin (ASPIRIN LOW DOSE) 81 MG tablet     atorvastatin (LIPITOR) 10 MG tablet     blood glucose (ACCU-CHEK MULTICLIX) lancing device     blood glucose monitoring (SOFTCLIX) lancets     divalproex (DEPAKOTE) 500 MG EC tablet     doxepin (SINEQUAN) 25 MG capsule     glipiZIDE (GLUCOTROL XL) 2.5 MG 24 hr tablet     hydrochlorothiazide (HYDRODIURIL) 12.5 MG tablet     lisinopril (PRINIVIL/ZESTRIL) 20 MG tablet     lurasidone (LATUDA) 40 MG TABS tablet     metFORMIN (GLUCOPHAGE-XR) 500 MG 24 hr tablet     modafinil (PROVIGIL) 200 MG tablet     Multiple Vitamins-Minerals (SENIOR MULTIVITAMIN PLUS PO)     NONFORMULARY     order for DME     order for DME     order for DME     pregabalin (LYRICA) 100 MG capsule     sennosides (SENOKOT) 8.6 MG tablet     tamsulosin (FLOMAX) 0.4 MG capsule     vitamin C (ASCORBIC ACID) 1000 MG TABS     No current facility-administered medications for this encounter.          Objective:  Vitals:  BP 91/54 (BP Location: Left arm)   Pulse 91   Temp 96.8  F (36  C) (Temporal)   Resp 18     A1C: 74 (1/2020)     General:  Patient is alert and orientated.  NAD      Dermatologic: Posterior right heel ulceration. Measurements below. No surrounding erythema, purulent drainage or malodor noted.  .   Wound (used by OP [facility] only) 09/27/19 0858 Right heel diabetic/neurophathic;pressure injury (Active)   Length (cm) 0.5 2/7/2020  9:00 AM   Width (cm) 1 2/7/2020  9:00 AM   Depth (cm) 0.1 2/7/2020  9:00 AM   Wound (cm^2) 0.5 cm^2 2/7/2020  9:00 AM   Wound Volume (cm^3) 0.05 cm^3 2/7/2020  9:00 AM   Wound healing % 96.28 2/7/2020  9:00 AM   Dressing Appearance moist drainage 2/7/2020  9:00 AM   Drainage Characteristics/Odor serosanguineous 2/7/2020  9:00 AM   Drainage Amount  moderate 2/7/2020  9:00 AM     Vascular: DP & PT pulses are intact & regular bilaterally.  edema and varicosities noted.  CFT and skin temperature is normal to both lower extremities.     Neurologic: Lower extremity sensation is diminished.      Musculoskeletal: Patient is ambulatory without assistive device or brace.  No gross ankle deformity noted.  No foot or ankle joint effusion is noted.     Assessment:    Diabetic polyneuropathy associated with type 2 diabetes mellitus (H)  Ulcer of heel and midfoot, right, with fat layer exposed (H)  Morbid obesity (H)  Edema of both lower legs due to peripheral venous insufficiency     Plan: At this time the heel wound was debrided.  Continue to wear heel suspension boot in bed or to have 3 pillows underneath to keep pressure off of the area.  Continue to wear compression socks and offloading shoe.    Continue taking vitamin D 5000 units.  We will have him follow-up in 1 month.  He was told to call with further questions or concerns.       Procedure:  After verbal consent, per protocol lidocaine was applied to the wound. Excisional debridement was performed on ulcer.   #15 blade was used to debride ulcer down to and including subcutaneous tissue. Bleeding controlled with light pressure.  No drainage noted.   < 20sq cm debrided.  Dry dressing applied to foot.  Patient tolerated procedure well.    Hyacinth Orta DPM, Podiatry/Foot and Ankle Surgery

## 2020-02-07 NOTE — DISCHARGE INSTRUCTIONS
[facility]  [address] [address]    Call us at [phone] if you have any questions about your wounds, have redness or swelling around your wound, have a fever of 101 or greater or if you have any other problems or concerns. We answer the phone Monday through Friday 8 am to 4 pm, please leave a message as we check the voicemail frequently throughout the day.     Parmjit Hernández      1955    Wound Dressing Change:Right Heel  Cleanse wound and surrounding skin with: soap and water  Cover wound with Missy  Change dressing daily    Continue with offloading shoe    WISAM Costa.P.M.. February 7, 2020    Follow up with Provider - 4 weeks

## 2020-02-10 ENCOUNTER — OFFICE VISIT (OUTPATIENT)
Dept: FAMILY MEDICINE | Facility: CLINIC | Age: 65
End: 2020-02-10
Payer: COMMERCIAL

## 2020-02-10 ENCOUNTER — TELEPHONE (OUTPATIENT)
Dept: FAMILY MEDICINE | Facility: CLINIC | Age: 65
End: 2020-02-10

## 2020-02-10 VITALS
TEMPERATURE: 97.5 F | BODY MASS INDEX: 41.91 KG/M2 | OXYGEN SATURATION: 96 % | RESPIRATION RATE: 16 BRPM | WEIGHT: 267 LBS | HEART RATE: 96 BPM | HEIGHT: 67 IN | DIASTOLIC BLOOD PRESSURE: 65 MMHG | SYSTOLIC BLOOD PRESSURE: 111 MMHG

## 2020-02-10 DIAGNOSIS — J30.2 SEASONAL ALLERGIC RHINITIS, UNSPECIFIED TRIGGER: Primary | ICD-10-CM

## 2020-02-10 DIAGNOSIS — I73.9 PERIPHERAL VASCULAR DISEASE (H): ICD-10-CM

## 2020-02-10 DIAGNOSIS — E11.9 TYPE 2 DIABETES MELLITUS WITHOUT COMPLICATION, WITHOUT LONG-TERM CURRENT USE OF INSULIN (H): ICD-10-CM

## 2020-02-10 PROCEDURE — 99214 OFFICE O/P EST MOD 30 MIN: CPT | Performed by: FAMILY MEDICINE

## 2020-02-10 RX ORDER — FEXOFENADINE HCL 60 MG/1
60 TABLET, FILM COATED ORAL 2 TIMES DAILY
Qty: 30 TABLET | Refills: 0 | Status: SHIPPED | OUTPATIENT
Start: 2020-02-10 | End: 2020-03-05

## 2020-02-10 ASSESSMENT — MIFFLIN-ST. JEOR: SCORE: 1964.73

## 2020-02-10 NOTE — PROGRESS NOTES
Subjective     Parmjit Hernández is a 64 year old male who presents to clinic today for the following health issues:    HPI   ALLERGIES      Duration: 1 week    Description:   Nasal congestion: YES- nasal drip  Sneezing: no   Red, itchy eyes: no     Accompanying signs and symptoms: None    History (similar episodes/allergy testing): Same thing happened last year around same time    Precipitating or alleviating factors: None    Therapies tried and outcome: None    Patient Active Problem List   Diagnosis     Obstructive sleep apnea     HYPERLIPIDEMIA LDL GOAL <100     Hypertension goal BP (blood pressure) < 140/90     Advanced directives, counseling/discussion     Migraine     History of diverticulitis     MENTAL HEALTH     Marianne (H)     Bipolar I disorder (H)     Chronic abdominal pain     Anxiety     Other amyloidosis (H)     Insomnia due to medical condition     Narcotic dependence (H)     Obstructive sleep apnea syndrome     Type 2 diabetes mellitus without complication, without long-term current use of insulin (H)     Restless legs syndrome (RLS)     Type 2 diabetes mellitus with other specified complication (H)     Peripheral edema     Morbid obesity (H)     Stasis dermatitis of both legs     Polyneuropathy associated with underlying disease (H)     Acquired lymphedema     Renal failure     Low blood pressure reading     Past Surgical History:   Procedure Laterality Date     BMT PROTOCOL      for systemic amyloidosis     BONE MARROW BIOPSY, BONE SPECIMEN, NEEDLE/TROCAR N/A 6/8/2016    Procedure: BIOPSY BONE MARROW;  Surgeon: Nathan Agrawal MD;  Location:  GI     BONE MARROW BIOPSY, BONE SPECIMEN, NEEDLE/TROCAR N/A 2/20/2019    Procedure: BIOPSY BONE MARROW;  Surgeon: Michael Raygoza MD;  Location:  GI     C NONSPECIFIC PROCEDURE  94    Cholecystectomy     C NONSPECIFIC PROCEDURE  2000    repair deviated septum     CHOLECYSTECTOMY  1995    lap qian     COLONOSCOPY  2012    hx polyps      "ESOPHAGOSCOPY, GASTROSCOPY, DUODENOSCOPY (EGD), COMBINED N/A 5/29/2015    Procedure: COMBINED ESOPHAGOSCOPY, GASTROSCOPY, DUODENOSCOPY (EGD), BIOPSY SINGLE OR MULTIPLE;  Surgeon: John Jacob MD;  Location:  GI     HERNIA REPAIR, UMBILICAL  2006       Social History     Tobacco Use     Smoking status: Never Smoker     Smokeless tobacco: Never Used   Substance Use Topics     Alcohol use: No     Alcohol/week: 0.0 standard drinks     Family History   Problem Relation Age of Onset     Cerebrovascular Disease Mother      C.A.D. Mother      Hypertension Mother      Alcohol/Drug Mother      Arthritis Mother      Cerebrovascular Disease Maternal Grandmother      C.A.D. Maternal Grandmother      Alcohol/Drug Maternal Grandmother      C.A.D. Father      Heart Disease Father      Hypertension Father      Alcohol/Drug Father      Allergies Father      Circulatory Father      Depression Father      Respiratory Father      Asthma Father      Alcohol/Drug Paternal Grandfather      Cerebrovascular Disease Paternal Grandfather      Depression Son      Psychotic Disorder Son         anxiety disorder     Depression Daughter      Cerebrovascular Disease Maternal Grandfather      Cerebrovascular Disease Paternal Grandmother      Diabetes Brother      Allergies Sister      Depression Sister      Gynecology Sister          Reviewed and updated as needed this visit by Provider         Review of Systems   ROS COMP: Constitutional, HEENT, cardiovascular, pulmonary, gi and gu systems are negative, except as otherwise noted.      Objective    /65   Pulse 96   Temp 97.5  F (36.4  C) (Oral)   Resp 16   Ht 1.702 m (5' 7\")   Wt 121.1 kg (267 lb)   SpO2 96%   BMI 41.82 kg/m    Body mass index is 41.82 kg/m .  Physical Exam   GENERAL: healthy, alert and no distress  EYES: Eyes grossly normal to inspection, PERRL and conjunctivae and sclerae normal  HENT: ear canals and TM's normal, nose and mouth without ulcers or " lesions  RESP: lungs clear to auscultation - no rales, rhonchi or wheezes  CV: regular rate and rhythm, normal S1 S2, no S3 or S4, no murmur, click or rub, no peripheral edema and peripheral pulses strong  Diabetic foot exam: normal DP and PT pulses, small well healing ulcer at the sole of the foot.    Diagnostic Test Results:  Labs reviewed in Epic        Assessment & Plan       ICD-10-CM    1. Seasonal allergic rhinitis, unspecified trigger J30.2 fexofenadine (ALLEGRA) 60 MG tablet   2. Type 2 diabetes mellitus without complication, without long-term current use of insulin (H) E11.9 C FOOT EXAM  NO CHARGE   3. Peripheral vascular disease (H) I73.9      Long history of poor healing foot ulcer. Sees the wound clinic for routine care.     Will schedule another appointment with the Eye clinic.       Return in about 3 months (around 5/10/2020) for Follow-up visit.    Vince Henry MD  [facility]

## 2020-02-10 NOTE — TELEPHONE ENCOUNTER
"ACCUCHEK TYLER PLUS TESTING STRIPS  Last Written Prescription Date:  01/03/19  Last Fill Quantity: 100,  # refills: 6   Last office visit: No previous visit found with prescribing provider:  YES   Future Office Visit:   Next 5 appointments (look out 90 days)    Mar 06, 2020 10:30 AM CST  Return Visit with Hyacinth Orta DPM, Podiatry/Foot and Ankle Surgery  [facility] [place] ([facility]) [address] [facility] [address]  [facility] [id number] [phone]           Requested Prescriptions   Pending Prescriptions Disp Refills     blood glucose (ACCU-CHEK COMPACT DRUM) test strip 100 strip 6     Sig: Use to test blood sugars 3 to 4 times daily.       Diabetic Supplies Protocol Failed - 2/10/2020 11:27 AM        Failed - Medication is active on med list        Passed - Patient is 18 years of age or older        Passed - Recent (6 mo) or future (30 days) visit within the authorizing provider's specialty     Patient had office visit in the last 6 months or has a visit in the next 30 days with authorizing provider.  See \"Patient Info\" tab in inbasket, or \"Choose Columns\" in Meds & Orders section of the refill encounter.            "

## 2020-02-10 NOTE — TELEPHONE ENCOUNTER
Erickson has been approved to the assistance program for Lyrica through [employer]. Erickson will receive this medication at no cost through December 31, 2020 the enrollment period.    A 90 day supply of LYRICA will be delivered to Erickson's home on February 11, 2020. Erickson has been informed of this approval.    He will contact my office for refills as we must work directly with the .  I will note EPIC as each refill is requested.    Thank you,    Merle Morales  Prescription Assistance

## 2020-02-10 NOTE — TELEPHONE ENCOUNTER
Prescription approved per [facility] Refill Protocol.  Stacie MAYA RN

## 2020-02-14 ENCOUNTER — TRANSFERRED RECORDS (OUTPATIENT)
Dept: HEALTH INFORMATION MANAGEMENT | Facility: CLINIC | Age: 65
End: 2020-02-14

## 2020-02-14 LAB — RETINOPATHY: NEGATIVE

## 2020-02-17 ENCOUNTER — TELEPHONE (OUTPATIENT)
Dept: WOUND CARE | Facility: CLINIC | Age: 65
End: 2020-02-17

## 2020-02-17 ENCOUNTER — MYC MEDICAL ADVICE (OUTPATIENT)
Dept: FAMILY MEDICINE | Facility: CLINIC | Age: 65
End: 2020-02-17

## 2020-02-17 NOTE — TELEPHONE ENCOUNTER
Patient stated his wound appears to be healed because its looking very dry.. He needs to know should he discontinue the creams he's using

## 2020-02-17 NOTE — TELEPHONE ENCOUNTER
He believes his wound is healed. He has left it BASSAM for a couple of days. I educated him that it was Okay to stop the kevin and put vanicream on area to prevent splitting and fissures and reopening of heel.

## 2020-02-24 ENCOUNTER — OFFICE VISIT (OUTPATIENT)
Dept: FAMILY MEDICINE | Facility: CLINIC | Age: 65
End: 2020-02-24
Payer: COMMERCIAL

## 2020-02-24 ENCOUNTER — MYC MEDICAL ADVICE (OUTPATIENT)
Dept: FAMILY MEDICINE | Facility: CLINIC | Age: 65
End: 2020-02-24

## 2020-02-24 VITALS
DIASTOLIC BLOOD PRESSURE: 80 MMHG | WEIGHT: 261.4 LBS | HEIGHT: 67 IN | RESPIRATION RATE: 18 BRPM | SYSTOLIC BLOOD PRESSURE: 119 MMHG | OXYGEN SATURATION: 95 % | BODY MASS INDEX: 41.03 KG/M2 | TEMPERATURE: 98.1 F | HEART RATE: 84 BPM

## 2020-02-24 DIAGNOSIS — L40.9 PSORIASIS: ICD-10-CM

## 2020-02-24 DIAGNOSIS — E11.621 DIABETIC ULCER OF RIGHT HEEL ASSOCIATED WITH TYPE 2 DIABETES MELLITUS, LIMITED TO BREAKDOWN OF SKIN (H): ICD-10-CM

## 2020-02-24 DIAGNOSIS — E78.1 HYPERTRIGLYCERIDEMIA: ICD-10-CM

## 2020-02-24 DIAGNOSIS — L97.411 DIABETIC ULCER OF RIGHT HEEL ASSOCIATED WITH TYPE 2 DIABETES MELLITUS, LIMITED TO BREAKDOWN OF SKIN (H): ICD-10-CM

## 2020-02-24 DIAGNOSIS — E11.69 TYPE 2 DIABETES MELLITUS WITH OTHER SPECIFIED COMPLICATION, WITHOUT LONG-TERM CURRENT USE OF INSULIN (H): Primary | ICD-10-CM

## 2020-02-24 PROCEDURE — 99214 OFFICE O/P EST MOD 30 MIN: CPT | Performed by: FAMILY MEDICINE

## 2020-02-24 PROCEDURE — 80061 LIPID PANEL: CPT | Performed by: FAMILY MEDICINE

## 2020-02-24 PROCEDURE — 36415 COLL VENOUS BLD VENIPUNCTURE: CPT | Performed by: FAMILY MEDICINE

## 2020-02-24 RX ORDER — GLIPIZIDE 2.5 MG/1
7.5 TABLET, EXTENDED RELEASE ORAL DAILY
Qty: 180 TABLET | Refills: 0 | Status: SHIPPED | OUTPATIENT
Start: 2020-02-24 | End: 2020-03-19

## 2020-02-24 ASSESSMENT — MIFFLIN-ST. JEOR: SCORE: 1939.33

## 2020-02-24 NOTE — NURSING NOTE
"Chief Complaint   Patient presents with     Hypertension     Derm Problem     /80   Pulse 84   Temp 98.1  F (36.7  C) (Oral)   Resp 18   Ht 1.702 m (5' 7\")   Wt 118.6 kg (261 lb 6.4 oz)   SpO2 95%   BMI 40.94 kg/m   Estimated body mass index is 40.94 kg/m  as calculated from the following:    Height as of this encounter: 1.702 m (5' 7\").    Weight as of this encounter: 118.6 kg (261 lb 6.4 oz).  bp completed using cuff size: large      Health Maintenance addressed:  Eye exam    Pt will schedule  appt.    Kelly Malik MA    "

## 2020-02-24 NOTE — PROGRESS NOTES
Subjective     Parmjit Hernández is a 64 year old male who presents to clinic today for the following health issues:    HPI   Hypertension Follow-up      Do you check your blood pressure regularly outside of the clinic? Yes     Are you following a low salt diet? no    Are your blood pressures ever more than 140 on the top number (systolic) OR more   than 90 on the bottom number (diastolic), for example 140/90? No      How many servings of fruits and vegetables do you eat daily?  0-1    On average, how many sweetened beverages do you drink each day (Examples: soda, juice, sweet tea, etc.  Do NOT count diet or artificially sweetened beverages)?   0    How many days per week do you exercise enough to make your heart beat faster? 3 or less    How many minutes a day do you exercise enough to make your heart beat faster? 10 - 19    How many days per week do you miss taking your medication? 0    Edema      Duration: ongoing, chronic    Description  Location: both legs  Itching: no    Intensity:  Moderate pain if patient bumps the area    Accompanying signs and symptoms: patient feels the edema is clearing up, but he did peal the scab and wants to make sure that's okay    History (similar episodes/previous evaluation): ongoing history of edema    Precipitating or alleviating factors:  New exposures:  None  Recent travel: no      Therapies tried and outcome: compression socks , which helped heal    Leg status and heel status: mild swelling present. Heel ulcer is healing without concerns.      Blood pressure recovered from the steroid injections but his glucose is fluctuating.   Patient also wants to check blood sugars, which they range from 145- 229 fasting but average is 180. He didn't notice any change in his blood glucose since increasing the dose of glipizide to 5mg once daily.     Shingles  Vaccine- patient will call his insurance company.     Will do skyrize on Friday. Free injections.     Patient Active Problem List    Diagnosis     Obstructive sleep apnea     HYPERLIPIDEMIA LDL GOAL <100     Hypertension goal BP (blood pressure) < 140/90     Advanced directives, counseling/discussion     Migraine     History of diverticulitis     MENTAL HEALTH     Marianne (H)     Bipolar I disorder (H)     Chronic abdominal pain     Anxiety     Other amyloidosis (H)     Insomnia due to medical condition     Narcotic dependence (H)     Obstructive sleep apnea syndrome     Type 2 diabetes mellitus without complication, without long-term current use of insulin (H)     Restless legs syndrome (RLS)     Type 2 diabetes mellitus with other specified complication (H)     Peripheral edema     Morbid obesity (H)     Stasis dermatitis of both legs     Polyneuropathy associated with underlying disease (H)     Acquired lymphedema     Renal failure     Low blood pressure reading     Past Surgical History:   Procedure Laterality Date     BMT PROTOCOL      for systemic amyloidosis     BONE MARROW BIOPSY, BONE SPECIMEN, NEEDLE/TROCAR N/A 6/8/2016    Procedure: BIOPSY BONE MARROW;  Surgeon: Nathan Agrawal MD;  Location:  GI     BONE MARROW BIOPSY, BONE SPECIMEN, NEEDLE/TROCAR N/A 2/20/2019    Procedure: BIOPSY BONE MARROW;  Surgeon: Michael Raygoza MD;  Location:  GI     C NONSPECIFIC PROCEDURE  94    Cholecystectomy     C NONSPECIFIC PROCEDURE  2000    repair deviated septum     CHOLECYSTECTOMY  1995    lap qian     COLONOSCOPY  2012    hx polyps     ESOPHAGOSCOPY, GASTROSCOPY, DUODENOSCOPY (EGD), COMBINED N/A 5/29/2015    Procedure: COMBINED ESOPHAGOSCOPY, GASTROSCOPY, DUODENOSCOPY (EGD), BIOPSY SINGLE OR MULTIPLE;  Surgeon: John Jacob MD;  Location:  GI     HERNIA REPAIR, UMBILICAL  2006       Social History     Tobacco Use     Smoking status: Never Smoker     Smokeless tobacco: Never Used   Substance Use Topics     Alcohol use: No     Alcohol/week: 0.0 standard drinks     Family History   Problem Relation Age of Onset      "Cerebrovascular Disease Mother      C.A.D. Mother      Hypertension Mother      Alcohol/Drug Mother      Arthritis Mother      Cerebrovascular Disease Maternal Grandmother      C.A.D. Maternal Grandmother      Alcohol/Drug Maternal Grandmother      C.A.D. Father      Heart Disease Father      Hypertension Father      Alcohol/Drug Father      Allergies Father      Circulatory Father      Depression Father      Respiratory Father      Asthma Father      Alcohol/Drug Paternal Grandfather      Cerebrovascular Disease Paternal Grandfather      Depression Son      Psychotic Disorder Son         anxiety disorder     Depression Daughter      Cerebrovascular Disease Maternal Grandfather      Cerebrovascular Disease Paternal Grandmother      Diabetes Brother      Allergies Sister      Depression Sister      Gynecology Sister            Reviewed and updated as needed this visit by Provider       Review of Systems   ROS COMP: Constitutional, HEENT, cardiovascular, pulmonary, gi and gu systems are negative, except as otherwise noted.      Objective    /80   Pulse 84   Temp 98.1  F (36.7  C) (Oral)   Resp 18   Ht 1.702 m (5' 7\")   Wt 118.6 kg (261 lb 6.4 oz)   SpO2 95%   BMI 40.94 kg/m    Body mass index is 40.94 kg/m .  Physical Exam   GENERAL: healthy, alert and no distress  RESP: lungs clear to auscultation - no rales, rhonchi or wheezes  CV: regular rate and rhythm, normal S1 S2, no S3 or S4, no murmur, click or rub, no peripheral edema and peripheral pulses strong  MS: superficial ulcer of the right heel. No drainage noted.     Diagnostic Test Results:  Labs reviewed in Epic        Assessment & Plan       ICD-10-CM    1. Type 2 diabetes mellitus with other specified complication, without long-term current use of insulin (H) E11.69 glipiZIDE (GLUCOTROL XL) 2.5 MG 24 hr tablet   2. Hypertriglyceridemia E78.1 Lipid panel reflex to direct LDL Fasting   3. Psoriasis L40.9    4. Diabetic ulcer of right heel associated " with type 2 diabetes mellitus, limited to breakdown of skin (H) E11.621     L97.411      The patient has regular follow-up at wound clinic for the chronic right foot ulcer.  Also has regular follow-up with dermatology for psoriasis.  He will be starting skyrizi injections this week.    Return in about 3 months (around 5/24/2020) for Follow-up visit.    Vince Henry MD  [facility]

## 2020-02-25 LAB
CHOLEST SERPL-MCNC: 163 MG/DL
HDLC SERPL-MCNC: 32 MG/DL
LDLC SERPL CALC-MCNC: 107 MG/DL
NONHDLC SERPL-MCNC: 131 MG/DL
TRIGL SERPL-MCNC: 121 MG/DL

## 2020-02-25 NOTE — RESULT ENCOUNTER NOTE
Dear Erickson,   Here are your recent results.   Significant improvement in your triglycerides.   Overall good results!      Vince Henry MD

## 2020-03-05 ENCOUNTER — OFFICE VISIT (OUTPATIENT)
Dept: SLEEP MEDICINE | Facility: CLINIC | Age: 65
End: 2020-03-05
Payer: COMMERCIAL

## 2020-03-05 VITALS
BODY MASS INDEX: 42.38 KG/M2 | WEIGHT: 270 LBS | OXYGEN SATURATION: 97 % | HEIGHT: 67 IN | SYSTOLIC BLOOD PRESSURE: 139 MMHG | RESPIRATION RATE: 16 BRPM | HEART RATE: 96 BPM | DIASTOLIC BLOOD PRESSURE: 81 MMHG

## 2020-03-05 DIAGNOSIS — G47.33 OSA (OBSTRUCTIVE SLEEP APNEA): Primary | ICD-10-CM

## 2020-03-05 DIAGNOSIS — G47.10 HYPERSOMNIA: ICD-10-CM

## 2020-03-05 PROCEDURE — 99214 OFFICE O/P EST MOD 30 MIN: CPT | Performed by: INTERNAL MEDICINE

## 2020-03-05 ASSESSMENT — MIFFLIN-ST. JEOR: SCORE: 1978.34

## 2020-03-05 NOTE — PATIENT INSTRUCTIONS
1. Severe Obstructive sleep apnea   2. Delayed sleep phase circadian rhythm     - I think that the excessive sleepiness that you experiences is due to a combination of severe sleep apnea, insufficient sleep, delayed sleep phase and possible sedating effects of medications.     - Continue CPAP use.  - We will do an overnight sleep study to make sure that your sleep apnea is adequately treated  - Start taking melatonin 1 mg at 8-9 pm   - Use a light box (SAD lamp 10,000 lux ) for half an hour in the morning. Check with your Psychiatrist if this is ok to do because of your mood disorder  - See of you can discontinue doxepin as a sleeping pill     Instructions for treating Delayed Sleep Phase Syndrome:    Delayed Sleep Phase Syndrome (DSPS) means that your body's internal timing is set late compared to the 24 hour day. Therefore, it is often difficult to get up on time for work in the morning and sometimes difficult to fall asleep on time, in order to get enough sleep. People with DSPS often tend to like to stay up late on weekends and sleep in until between 10 AM and noon, sometimes even later.This is actually a bad habit that will perpetuate the problem. It reinforces your body's tendency to be on that later schedule.    You should go to bed when you are sleepy and ready to sleep. During this entire process, you should not engage in activities that may make it worse, such as watching TV in bed, leaving the TV on all night, drinking any caffeine 6 hours before bed or exercising 1-2 hours before bed.     Start taking Melatonin, 1 mg tablet 5 hours before the time that you fall asleep on average (not your desired bedtime or time that you get in bed, but the time you normally fall asleep on your own).     Upon awakening, get exposure to sun-light for about 30-45 minutes. You do not need to look at the sun, in fact, this is dangerous. Reading the paper with the sun shining on you is adequate.  Alternatively, you may use a  Seasonal Affective Disorder Lamp (intensity 10,000 Lux) instead of the sun. The lamp should be positioned 1-2 arms lengths away from you. They lamps are sold at Home Medical Companies such as [employer] or [employer]. A prescription can be written to get insurance coverage in some cases. They are also sold on [employer].    Using the light and melatonin should help march your internal clock (known as your circadian rhythms) gradually earlier. As your bedtime advances, remember to take your melatonin earlier, keeping it 5 hours before your fall asleep time.    Avoid naps and sleeping in because sleeping during the day will delay your body's clock and you will have to start from scratch.     More information about light therapy:    If you have any concerns regarding the safety of bright light therapy for you, it is recommended that you consult an ophthalmologist before using a light box.  If you have a condition that makes your eyes very sensitive to light, macular degeneration, a family history of such problems, or diabetic changes to your eyes, consult an ophthalmologist before using a light box. If you have anxiety disorder and have an increase in anxiety discontinue use.    Your Body mass index is 42.29 kg/m .  Weight management is a personal decision.  If you are interested in exploring weight loss strategies, the following discussion covers the approaches that may be successful. Body mass index (BMI) is one way to tell whether you are at a healthy weight, overweight, or obese. It measures your weight in relation to your height.  A BMI of 18.5 to 24.9 is in the healthy range. A person with a BMI of 25 to 29.9 is considered overweight, and someone with a BMI of 30 or greater is considered obese. More than two-thirds of American adults are considered overweight or obese.  Being overweight or obese increases the risk for further weight gain. Excess weight may lead to heart disease and  diabetes.  Creating and following plans for healthy eating and physical activity may help you improve your health.  Weight control is part of healthy lifestyle and includes exercise, emotional health, and healthy eating habits. Careful eating habits lifelong are the mainstay of weight control. Though there are significant health benefits from weight loss, long-term weight loss with diet alone may be very difficult to achieve- studies show long-term success with dietary management in less than 10% of people. Attaining a healthy weight may be especially difficult to achieve in those with severe obesity. In some cases, medications, devices and surgical management might be considered.  What can you do?  If you are overweight or obese and are interested in methods for weight loss, you should discuss this with your provider.     Consider reducing daily calorie intake by 500 calories.     Keep a food journal.     Avoiding skipping meals, consider cutting portions instead.    Diet combined with exercise helps maintain muscle while optimizing fat loss. Strength training is particularly important for building and maintaining muscle mass. Exercise helps reduce stress, increase energy, and improves fitness. Increasing exercise without diet control, however, may not burn enough calories to loose weight.       Start walking three days a week 10-20 minutes at a time    Work towards walking thirty minutes five days a week     Eventually, increase the speed of your walking for 1-2 minutes at time    In addition, we recommend that you review healthy lifestyles and methods for weight loss available through the National Institutes of Health patient information sites:  http://win.niddk.nih.gov/publications/index.htm    And look into health and wellness programs that may be available through your health insurance provider, employer, local community center, or ronald club.

## 2020-03-05 NOTE — PROGRESS NOTES
Obstructive Sleep Apnea - PAP Follow-Up Visit:    Chief Complaint   Patient presents with     CPAP Follow Up     discuss possible narcolepsy       Parmjit Hernández comes in today for follow-up of their severe sleep apnea, managed with CPAP.     PSG on 5/28/2008 showed an AHI of 98.3 per hour and O2 saturation sawyer of 76%.      Medical history is significant for HTN, DM 2, bipolar I disorder, anxiety, OCD, amyloidosis and obesity.     Patient has a delayed sleep phase circadian rhythm. Bedtime is typically 12 am - 4 am. He takes doxepin 25 mg at bedtime. Once he goes to bed, he falls asleep immediately. Wake time is typically 10 am.  Patient is using PAP therapy 6-7 hours per night. The patient is usually getting 6 hours of sleep per night.    He is prescribed Modafinil 200 mg which he takes at 10 am.  He also takes caffiene tablet 400 mg in the morning.     Patient is a  and work includes meetings, phone calls. He falls asleep in meetings.      Overall, he rates the experience with PAP as ok. The mask is comfortable. The mask is not leaking.  He is not snoring with the mask on. He is not having gasp arousals.  He is not having significant oral/nasal dryness. The pressure settings are comfortable.     He uses full-face mask.     He does not feel rested in the morning.    [place] Sleepiness Scale: 24/24    Respironics     Auto-PAP 17-20 cmH2O download:  30/30 total days of use. 0 nonuse days.  Average use 7 hours 17 minutes per day.  87% days with >4 hours use.  Large leak 2 hrs 41 mins per day average. CPAP 90% pressure 17.9cm. AHI 15.6  (CA index: 0.2, OA index: 5.3, hypopnea index: 10.1)      Reviewed by team:      Reviewed by provider:        Problem List:  Patient Active Problem List    Diagnosis Date Noted     Low blood pressure reading 05/16/2019     Priority: Medium     Renal failure 05/10/2019     Priority: Medium     Polyneuropathy associated with underlying disease (H) 02/04/2019      Priority: Medium     Acquired lymphedema 02/04/2019     Priority: Medium     Stasis dermatitis of both legs 12/31/2018     Priority: Medium     Morbid obesity (H) 08/28/2018     Priority: Medium     Peripheral edema 05/20/2018     Priority: Medium     Noncardiac  Continue with  furosemide (LASIX) 20 MG tablet,   potassium       chloride SA (K-DUR/KLOR-CON M) 10 MEQ CR  Tab once daily   Basic metabolic panel       Type 2 diabetes mellitus with other specified complication (H) 07/10/2017     Priority: Medium     Obstructive sleep apnea syndrome 06/29/2017     Priority: Medium     sleep study       Restless legs syndrome (RLS) 06/29/2017     Priority: Medium     Narcotic dependence (H) 09/06/2016     Priority: Medium     None in about 6 months- 8/1/17       Insomnia due to medical condition 08/23/2016     Priority: Medium     Other amyloidosis (H) 06/01/2016     Priority: Medium      #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 7/3/2018      Dr [id number]       Anxiety 05/11/2016     Priority: Medium     Chronic abdominal pain 03/15/2016     Priority: Medium     Bipolar I disorder (H) 09/01/2015     Priority: Medium     Under care of psychiatrist [facility]- Dr Rahman  doxepin 25 mg, 2 cap bedtime  DULoxetine 20 mg once daily   OLANZapine 7.5 mg  1 tab bedtime        Marianne (H) 08/20/2015     Priority: Medium     MENTAL HEALTH 08/17/2015     Priority: Medium     History of diverticulitis 01/27/2015     Priority: Medium     1/15-rxed with antibiotics       Advanced directives, counseling/discussion 05/29/2012     Priority: Medium     Discussed advance care planning with patient; information given to patient to review. 5/29/2012     Honoring choices letter mailed. 7-  Lynsey Bustamante RT (R)         Migraine 05/29/2012     Priority: Medium     Hypertension goal BP (blood pressure) < 140/90 10/11/2011     Priority: Medium     HYPERLIPIDEMIA LDL GOAL  "<100 10/31/2010     Priority: Medium     Type 2 diabetes mellitus without complication, without long-term current use of insulin (H) 05/22/2009     Priority: Medium     Obstructive sleep apnea 07/16/2008     Priority: Medium          /81   Pulse 96   Resp 16   Ht 1.702 m (5' 7\")   Wt 122.5 kg (270 lb)   SpO2 97%   BMI 42.29 kg/m      Impression/Plan:     Severe obstructive sleep apnea.   Delayed sleep phase circadian rhythm   Hypersomnia     - Patient has severe sleep apnea and is on auto PAP therapy 17-20 cm H2O. There is excess leak and resiudal AHI is recorded high (high number of hypopneas) which I think is due to mask leak.     - Patient presents with excessive daytime sleepiness. He is prescribed Modafinil 200 mg which he takes once in the morning. I think his sleepiness is due to insufficient sleep, circadian misalignment and possible sedating effects of medications.     We have to make sure that sleep apnea is adequately treated and mask leak is optimized.  A titration PSG is recommended for optimizing PAP titration.     Meanwhile, he was advised regarding evening Melatonin and morning light therapy for management of delayed sleep phase. He should also consider discontinuing doxepin at bedtime.      Plan:     1. Titration PSG for sleep apnea  2. Follow up after sleep study     Twenty-five minutes spent with patient, all of which were spent face-to-face counseling, consulting, coordinating plan of care.      Ang Rodrigues MD, MD    CC:  Vince Henry,   "

## 2020-03-06 ENCOUNTER — HOSPITAL ENCOUNTER (OUTPATIENT)
Dept: WOUND CARE | Facility: CLINIC | Age: 65
Discharge: HOME | End: 2020-03-06
Attending: PODIATRIST | Admitting: PODIATRIST
Payer: COMMERCIAL

## 2020-03-06 VITALS
TEMPERATURE: 97.2 F | DIASTOLIC BLOOD PRESSURE: 61 MMHG | SYSTOLIC BLOOD PRESSURE: 103 MMHG | RESPIRATION RATE: 20 BRPM | HEART RATE: 86 BPM

## 2020-03-06 DIAGNOSIS — R60.0 PERIPHERAL EDEMA: ICD-10-CM

## 2020-03-06 DIAGNOSIS — E11.42 DIABETIC POLYNEUROPATHY ASSOCIATED WITH TYPE 2 DIABETES MELLITUS (H): ICD-10-CM

## 2020-03-06 DIAGNOSIS — E66.01 MORBID OBESITY (H): ICD-10-CM

## 2020-03-06 DIAGNOSIS — Z87.2 HEALED FOOT ULCER: ICD-10-CM

## 2020-03-06 PROCEDURE — 99213 OFFICE O/P EST LOW 20 MIN: CPT | Performed by: PODIATRIST

## 2020-03-06 PROCEDURE — G0463 HOSPITAL OUTPT CLINIC VISIT: HCPCS

## 2020-03-06 RX ORDER — RISANKIZUMAB-RZAA 75 MG/0.83
KIT SUBCUTANEOUS
COMMUNITY
Start: 2020-02-05 | End: 2020-04-15

## 2020-03-06 NOTE — PROGRESS NOTES
[facility] [place] Progress Note    Subject: Patient was seen at wound center.  Patient is here for follow-up on right posterior heel ulceration.  Has just been putting a Band-Aid over the area as he is notes that there has not been any drainage for the last week.  Denies fever, nausea, vomiting.    PMH:   Past Medical History:   Diagnosis Date     Allergic state      Amyloidosis (H)      Diabetes mellitus (H)     type 2     Headache(784.0)      Hypertension 2007     Myalgia and myositis, unspecified      Obsessive-compulsive disorders      Other acquired absence of organ 94     Other specified viral warts      Pain in the abdomen      Pure hypercholesterolemia      Sleep apnea        Social Hx:   Social History     Socioeconomic History     Marital status:      Spouse name: Not on file     Number of children: 3     Years of education: Not on file     Highest education level: Not on file   Occupational History     Employer: [employer]   Social Needs     Financial resource strain: Not on file     Food insecurity:     Worry: Not on file     Inability: Not on file     Transportation needs:     Medical: Not on file     Non-medical: Not on file   Tobacco Use     Smoking status: Never Smoker     Smokeless tobacco: Never Used   Substance and Sexual Activity     Alcohol use: No     Alcohol/week: 0.0 standard drinks     Drug use: No     Sexual activity: Never   Lifestyle     Physical activity:     Days per week: Not on file     Minutes per session: Not on file     Stress: Not on file   Relationships     Social connections:     Talks on phone: Not on file     Gets together: Not on file     Attends [employer] service: Not on file     Active member of club or organization: Not on file     Attends meetings of clubs or organizations: Not on file     Relationship status: Not on file     Intimate partner violence:     Fear of current or ex partner: Not on file     Emotionally abused: Not on file      Physically abused: Not on file     Forced sexual activity: Not on file   Other Topics Concern     Parent/sibling w/ CABG, MI or angioplasty before 65F 55M? No   Social History Narrative    Social Documentation:05/27/2010        Balanced Diet: NO    Calcium intake: 3-4 per day    Caffeine: 2 per day    Exercise:  type of activity NO    Sunscreen: Yes    Seatbelts:  Yes    Self Breast Exam:  No - na    Self Testicular Exam: no    Physical/Emotional/Sexual Abuse: No     Do you feel safe in your environment? Yes        Cholesterol screen up to date: Yes    Eye Exam up to date: Yes    Dental Exam up to date: Yes    Pap smear up to date: Does Not Apply    Mammogram up to date: Does Not Apply    Dexa Scan up to date:NO    Colonoscopy up to date:2007    Immunizations up to date: YES    Glucose screen if over 40: Yes        Sofi Rosas CMA                   Surgical Hx:   Past Surgical History:   Procedure Laterality Date     BMT PROTOCOL      for systemic amyloidosis     BONE MARROW BIOPSY, BONE SPECIMEN, NEEDLE/TROCAR N/A 6/8/2016    Procedure: BIOPSY BONE MARROW;  Surgeon: Nathan Agrawal MD;  Location:  GI     BONE MARROW BIOPSY, BONE SPECIMEN, NEEDLE/TROCAR N/A 2/20/2019    Procedure: BIOPSY BONE MARROW;  Surgeon: Michael Raygoza MD;  Location:  GI     C NONSPECIFIC PROCEDURE  94    Cholecystectomy     C NONSPECIFIC PROCEDURE  2000    repair deviated septum     CHOLECYSTECTOMY  1995    lap qian     COLONOSCOPY  2012    hx polyps     ESOPHAGOSCOPY, GASTROSCOPY, DUODENOSCOPY (EGD), COMBINED N/A 5/29/2015    Procedure: COMBINED ESOPHAGOSCOPY, GASTROSCOPY, DUODENOSCOPY (EGD), BIOPSY SINGLE OR MULTIPLE;  Surgeon: John Jacob MD;  Location:  GI     HERNIA REPAIR, UMBILICAL  2006       Allergies:    Allergies   Allergen Reactions     Cephalexin Diarrhea     Liraglutide Other (See Comments)     Sulfa Drugs Swelling     Pt has taken  Taken sulfa drugs orally without trouble. He had  problems with Sulfa eye drops. Eye swelled up     Victoza      Increased migraine frequency and severity       Medications:   Current Outpatient Medications   Medication     aspirin (ASPIRIN LOW DOSE) 81 MG tablet     atorvastatin (LIPITOR) 10 MG tablet     blood glucose (ACCU-CHEK COMPACT DRUM) test strip     blood glucose (ACCU-CHEK MULTICLIX) lancing device     blood glucose monitoring (SOFTCLIX) lancets     divalproex (DEPAKOTE) 500 MG EC tablet     doxepin (SINEQUAN) 25 MG capsule     glipiZIDE (GLUCOTROL XL) 2.5 MG 24 hr tablet     hydrochlorothiazide (HYDRODIURIL) 12.5 MG tablet     lisinopril (PRINIVIL/ZESTRIL) 20 MG tablet     lurasidone (LATUDA) 40 MG TABS tablet     metFORMIN (GLUCOPHAGE-XR) 500 MG 24 hr tablet     modafinil (PROVIGIL) 200 MG tablet     Multiple Vitamins-Minerals (SENIOR MULTIVITAMIN PLUS PO)     NONFORMULARY     pregabalin (LYRICA) 100 MG capsule     sennosides (SENOKOT) 8.6 MG tablet     SKYRIZI, 150 MG DOSE, 75 MG/0.83ML subcutaneous     tamsulosin (FLOMAX) 0.4 MG capsule     vitamin C (ASCORBIC ACID) 1000 MG TABS     No current facility-administered medications for this encounter.          Objective:  Vitals:  /61 (BP Location: Left arm)   Pulse 86   Temp 97.2  F (36.2  C) (Temporal)   Resp 20     A1C: 74 (1/2020)     General:  Patient is alert and orientated.  NAD      Dermatologic: Posterior right heel ulceration is healed.  No other open lesions are noted.     Vascular: DP & PT pulses are intact & regular bilaterally.  edema and varicosities noted.  CFT and skin temperature is normal to both lower extremities.     Neurologic: Lower extremity sensation is diminished.      Musculoskeletal: Patient is ambulatory without assistive device or brace.  No gross ankle deformity noted.  No foot or ankle joint effusion is noted.     Assessment:    Healed foot ulcer  Diabetic polyneuropathy associated with type 2 diabetes mellitus (H)  Morbid obesity (H)  Peripheral edema     Plan: At  this time, the wound is healed.  We will have him continue to apply a large bandage to the area to protect the new skin for the next week or 2.  He will follow-up as needed.  He is discharged from the wound center and will call with further questions or concerns.       Hyacinth Orta DPM, Podiatry/Foot and Ankle Surgery

## 2020-03-06 NOTE — DISCHARGE INSTRUCTIONS
[facility]  [address] [address]    Call us at [phone] if you have any questions about your wounds, have redness or swelling around your wound, have a fever of 101 or greater or if you have any other problems or concerns. We answer the phone Monday through Friday 8 am to 4 pm, please leave a message as we check the voicemail frequently throughout the day.     Parmjit Larioscamron      1955         [facility] [place]  [address] [address]  Appointment Phone [phone]   Parmjit VILLEDA Betty      1955  Your wound is Healed!! Dressings are no longer required.     Keep it covered with a bandaid and change it every 3 days and if it falls off      Hyacinth Orta D.P.M.. March 6, 2020    Reducing a Patient s Risk for Pressure Injuries  There is no single preventive for pressure injuries. Give priority to pressure relief. Reduce other risks to help maintain a healthy flow of nutrients to the patient s skin.  Relieve pressure  Relieving pressure is the single most important factor in preventing and treating pressure injuries.  You can relieve pressure and restore the skin s blood supply by repositioning the patient and using special devices. Post a schedule to remind you to reposition the patient -- from side to back or from stomach to side. Make minor position changes even more frequently. Specially designed pillows, beds, or mattresses can also help reduce pressure.  In a bed    Use pillows under calves to elevate the legs from above the knees to the ankles.    Alter the angles of arms and legs.  In a wheelchair    Be sure the wheelchair is the proper size for the patient to give optimal support. For example, if the seat it too narrow, it will put extra pressure on the hips.    Cushion the back and buttocks with pillows or wheelchair cushions, and pad the footrest.    Use a special wheelchair cushion that is  designed to distribute weight evenly and relieve pressure points that is evaluated yearly.    Have special cushions tested and adjusted at the proper times as recommended by the . This will allow the pressure relief to remain effective.   Manage moisture  Keep skin clean and lubricated, but free of excess moisture. Put the patient on a regular toilet schedule. Use incontinent devices, if appropriate. Consult with a doctor about using diarrhea medicines:    Use talc-free powders or barrier creams.    Pat skin dry after bathing.    Lubricate skin with lotion.  Reduce shear and friction  Prevent skin breakdown by reducing friction and shear. Patients are less likely to slide down in bed if they re supported by pillows and the head of the bed isn t raised too high. During bed or wheelchair transfers, lift--don t drag--the patient. If you can t do this alone, get help. And be sure to use assistive devices, whenever possible.  In a bed    Use draw-sheets or transfer boards to move patients.    Clean and smooth the bed surface.    Lift the head of the bed no more than 30 .    Raise the foot of the bed slightly.  In a wheelchair    Support the patient s back with a pillow.    Use a foot extension.         Hyacinth Orta D.P.M.. March 6, 2020    Follow up with Provider - as needed

## 2020-03-06 NOTE — ADDENDUM NOTE
Encounter addended by: Alvina Torres RN on: 3/6/2020 11:42 AM   Actions taken: Charge Capture section accepted, Clinical Note Signed

## 2020-03-15 ENCOUNTER — HEALTH MAINTENANCE LETTER (OUTPATIENT)
Age: 65
End: 2020-03-15

## 2020-03-17 ENCOUNTER — VIRTUAL VISIT (OUTPATIENT)
Dept: FAMILY MEDICINE | Facility: OTHER | Age: 65
End: 2020-03-17

## 2020-03-17 ENCOUNTER — MYC MEDICAL ADVICE (OUTPATIENT)
Dept: FAMILY MEDICINE | Facility: CLINIC | Age: 65
End: 2020-03-17

## 2020-03-17 NOTE — PROGRESS NOTES
"Date: 2020 22:34:00  Clinician: JUNIOR Crowe  Clinician NPI: [id number]  Patient: Parmjit Hernández  Patient : 1955  Patient Address: [address] [place] [address], [place] [address]  Patient Phone: [phone]  Visit Protocol: URI  Patient Summary:  Parmjit is a 64 year old ( : 1955 ) male who initiated a Visit for COVID-19 (Coronavirus) evaluation and screening. When asked the question \"Please sign me up to receive news, health information and promotions. \", Parmjit responded \"No\".    Parmjit states his symptoms started 1-2 days ago.   His symptoms consist of nasal congestion and malaise.   Symptom details   Nasal secretions: The color of his mucus is clear.   Parmjit denies having wheezing, sore throat, cough, teeth pain, fever, chills, ear pain, headache, rhinitis, enlarged lymph nodes, facial pain or pressure, and myalgias. He also denies taking antibiotic medication for the symptoms and having recent facial or sinus surgery in the past 60 days. He is not experiencing dyspnea.    Pertinent COVID-19 (Coronavirus) information  Parmjit has not traveled internationally or to the areas where COVID-19 (Coronavirus) is widespread in the last 14 days before the start of his symptoms.   Parmjit has not had a close contact with a laboratory-confirmed COVID-19 patient within 14 days of symptom onset. He also has not had a close contact with a suspected COVID-19 patient within 14 days of symptom onset.   Parmjit is not a healthcare worker and does not work in a healthcare facility.   Pertinent medical history  Parmjit does not need a return to work/school note.   Weight: 250 lbs   Parmjit does not smoke or use smokeless tobacco.   Weight: 250 lbs    MEDICATIONS: lisinopril oral, glipizide oral, atorvastatin oral, Flomax oral, metformin oral, Lyrica oral, amiloride-hydrochlorothiazide oral, modafinil oral, Depakote ER oral, Latuda oral, ALLERGIES: NKDA  Clinician Response:  Dear Parmjit,  Based on the " information provided, you have a viral upper respiratory infection, otherwise known as a cold. Symptoms vary from person to person, but can include sneezing, coughing, a runny nose, sore throat, and headache and range from mild to severe.  Unfortunately, there are no medications that can cure a cold, so treatment is focused on controlling symptoms as much as possible. Most people gradually feel better until symptoms are gone in 1-2 weeks.  Medication information  Because you have a viral infection, antibiotics will not help you get better. Treating a viral infection with antibiotics could actually make you feel worse.  Unless you are allergic to the over-the-counter medication(s) below, I recommend using:       Acetaminophen (Tylenol or store brand) oral tablet. Take 1-2 tablets by mouth every 4-6 hours to help with the discomfort.      Ibuprofen (Advil or store brand) 200 mg oral tablet. Take 1-3 tablets (200-600 mg) by mouth every 8 hours to help with the discomfort. Make sure to take the ibuprofen with food. Do not exceed 2400 mg in 24 hours.      Saline nasal spray ([place] or store brand). Use 1-2 sprays in each nostril 3 times a day as needed for congestion.    A sinus irrigation kit such as Sinus Rinse, Neti Pot, SinuCleanse, or store brand. Be sure to use sterile or previously boiled water to prevent unwanted infections.     Over-the-counter medications do not require a prescription. Ask the pharmacist if you have any questions.  Self care  The following tips will keep you as comfortable as possible while you recover:     Rest    Drink plenty of water and other liquids    Take a hot shower to loosen congestion     When to seek care  Please be seen in a clinic or urgent care if new symptoms develop, or symptoms become worse.  COVID-19 (Coronavirus) General Information  With the increase in the number of COVID-19 (Coronavirus) cases, we understand you may have some questions. Below is some helpful information on  COVID-19 (Coronavirus).  How can I protect myself and others from the COVID-19 (Coronavirus)?  Because there is currently no vaccine to prevent infection, the best way to protect yourself is to avoid being exposed to this virus. Put distance between yourself and other people if COVID-19 (Coronavirus) is spreading in your community. The virus is thought to spread mainly from person-to-person.     Between people who are in close contact with one another (within about 6 about) for prolonged period (10 minutes or longer).    Through respiratory droplets produced when an infected person coughs or sneezes.     The CDC recommends the following additional steps to protect yourself and others:     Wash your hands often with soap and water for at least 20 seconds, especially after blowing your nose, coughing, or sneezing; going to the bathroom; and before eating or preparing food.  Use an alcohol-based hand  that contains at least 60 percent alcohol if soap and water are not available.        Avoid touching your eyes, nose and mouth with unwashed hands.    Avoid close contact with people who are sick.    Stay home when you are sick.    Cover your cough or sneeze with a tissue, then throw the tissue in the trash.    Clean and disinfect frequently touched objects and surfaces.     You can help stop COVID-19 (Coronavirus) by knowing the signs and symptoms:     Fever    Cough    Shortness of breath     Contact your healthcare provider if   Develop symptoms   AND   Have been in close contact with a person known to have COVID-19 (Coronavirus) or live in or have recently traveled from an area with ongoing spread of COVID-19 (Coronavirus). Call ahead before you go to a doctor's office or emergency room. Tell them about your recent travel and your symptoms.   For the most up to date information, visit the CDC's website.  Steps to help prevent the spread of COVID-19 (Coronavirus) if you are sick  If you are sick with COVID-19  "(Coronavirus) or suspect you are infected with the virus that causes COVID-19 (Coronavirus), follow the steps below to help prevent the disease from spreading&nbsp;to people in your home and community.     Stay home except to get medical care. Home isolation may be started in consultation with your healthcare clinician.    Separate yourself from other people and animals in your home.    Call ahead before visiting your doctor if you have a medical appointment.    Wear a facemask when you are around other people.    Cover your cough and sneezes.    Clean your hands often.    Avoid sharing personal household items.    Clean and disinfect frequently touched objects and surfaces everyday.    You will need to have someone drop off medications or household supplies (if needed) at your house without coming inside or in contact with you or others living in your house.    Monitor your symptoms and seek prompt medical care if your illness is worsening (e.g. Difficulty breathing).    Discontinue home isolation only in consultation with your healthcare provider.     For more detailed and up to date information on what to do if you are sick, visit this link: What to Do If You Are Sick With Coronavirus Disease 2019 (COVID-19).  Do I need to be tested for COVID-19 (Coronavirus)?     At this time, the limited number of tests available are controlled by the state and local health departments and are being reserved for more seriously ill patients, those with known exposure to confirmed patients, and those with recent travel (within 14 days) to countries with high rates of COVID-19 (Coronavirus).    Decisions on which patients receive testing will be based on the local spread of COVID-19 (Coronavirus) as well as the symptoms. Your healthcare provider will make the final decision on whether you should be tested.    In the meantime, if you have concerns that you may have been exposed, it is reasonable to practice \"social " "distancing.\"&nbsp; If you are ill with a cold or flu-like illness, please monitor your symptoms and reach out to your healthcare provider if your symptoms worsen.    For more up to date information, visit this link: COVID-19 (Coronavirus) Frequently Asked Questions and Answers.     I am providing you with a promo code for a free Visit (redeemable at this website only). This code will  in two weeks and may only be used once. Please redeem your free Visit by entering the following promo code on the payment screen: [email or web address]   Diagnosis: Viral URI  Diagnosis ICD: J06.9  Additional Clinician Notes: Based upon your absence of symptoms I do not suspect presence of Covid-19 at this time.  "

## 2020-03-18 ENCOUNTER — MYC MEDICAL ADVICE (OUTPATIENT)
Dept: FAMILY MEDICINE | Facility: CLINIC | Age: 65
End: 2020-03-18

## 2020-03-18 NOTE — TELEPHONE ENCOUNTER
FS  Please see [employer] message  Do you think an eVisit for this or maybe a phone visit with MTM?  Thank you,  Anca Dallas RN

## 2020-03-19 ENCOUNTER — TELEPHONE (OUTPATIENT)
Dept: SLEEP MEDICINE | Facility: CLINIC | Age: 65
End: 2020-03-19

## 2020-03-19 ENCOUNTER — NURSE TRIAGE (OUTPATIENT)
Dept: NURSING | Facility: CLINIC | Age: 65
End: 2020-03-19

## 2020-03-19 ENCOUNTER — OFFICE VISIT (OUTPATIENT)
Dept: FAMILY MEDICINE | Facility: CLINIC | Age: 65
End: 2020-03-19
Payer: COMMERCIAL

## 2020-03-19 VITALS
DIASTOLIC BLOOD PRESSURE: 77 MMHG | TEMPERATURE: 97.7 F | WEIGHT: 267 LBS | HEART RATE: 72 BPM | HEIGHT: 67 IN | RESPIRATION RATE: 18 BRPM | OXYGEN SATURATION: 96 % | SYSTOLIC BLOOD PRESSURE: 116 MMHG | BODY MASS INDEX: 41.91 KG/M2

## 2020-03-19 DIAGNOSIS — E11.9 TYPE 2 DIABETES MELLITUS WITHOUT COMPLICATION, WITHOUT LONG-TERM CURRENT USE OF INSULIN (H): Primary | ICD-10-CM

## 2020-03-19 DIAGNOSIS — E11.621 DIABETIC ULCER OF RIGHT HEEL ASSOCIATED WITH TYPE 2 DIABETES MELLITUS, LIMITED TO BREAKDOWN OF SKIN (H): ICD-10-CM

## 2020-03-19 DIAGNOSIS — L97.411 DIABETIC ULCER OF RIGHT HEEL ASSOCIATED WITH TYPE 2 DIABETES MELLITUS, LIMITED TO BREAKDOWN OF SKIN (H): ICD-10-CM

## 2020-03-19 DIAGNOSIS — E16.2 HYPOGLYCEMIA: ICD-10-CM

## 2020-03-19 PROCEDURE — 99214 OFFICE O/P EST MOD 30 MIN: CPT | Performed by: FAMILY MEDICINE

## 2020-03-19 RX ORDER — GLIPIZIDE 2.5 MG/1
2.5 TABLET, EXTENDED RELEASE ORAL DAILY
Qty: 30 TABLET | Refills: 0 | Status: SHIPPED | OUTPATIENT
Start: 2020-03-19 | End: 2020-05-08

## 2020-03-19 ASSESSMENT — MIFFLIN-ST. JEOR: SCORE: 1964.73

## 2020-03-19 NOTE — NURSING NOTE
"Chief Complaint   Patient presents with     Diabetes     /77   Pulse 72   Temp 97.7  F (36.5  C) (Oral)   Resp 18   Ht 1.702 m (5' 7\")   Wt 121.1 kg (267 lb)   SpO2 96%   BMI 41.82 kg/m   Estimated body mass index is 41.82 kg/m  as calculated from the following:    Height as of this encounter: 1.702 m (5' 7\").    Weight as of this encounter: 121.1 kg (267 lb).  bp completed using cuff size: large       Health Maintenance addressed:  NONE    n/a    Rebecca Elise CMA, MA     "

## 2020-03-19 NOTE — TELEPHONE ENCOUNTER
LVM informing pt that our sleep lab is closed and we will call to reschedule as soon as we are able.

## 2020-03-19 NOTE — PROGRESS NOTES
Subjective     Parmjit Hernández is a 64 year old male who presents to clinic today for the following health issues:    HPI   Diabetes Follow-up    How often are you checking your blood sugar? Four or more times daily  Blood sugar testing frequency justification:  Uncontrolled diabetes  What time of day are you checking your blood sugars (select all that apply)?  Before and after meals  Have you had any blood sugars above 200?  No  Have you had any blood sugars below 70?  No    What symptoms do you notice when your blood sugar is low?  Shaky, Dizzy, Weak and Lethargy    What concerns do you have today about your diabetes? Low blood sugar and Other: weakness/fatigued      Do you have any of these symptoms? (Select all that apply)  No numbness or tingling in feet.  No redness, sores or blisters on feet.  No complaints of excessive thirst.  No reports of blurry vision.  No significant changes to weight.      BP Readings from Last 2 Encounters:   03/19/20 116/77   03/06/20 103/61     Hemoglobin A1C (%)   Date Value   01/09/2020 7.4 (H)   07/31/2019 5.4     LDL Cholesterol Calculated (mg/dL)   Date Value   02/24/2020 107 (H)   05/13/2019 39         How many servings of fruits and vegetables do you eat daily?  2-3    On average, how many sweetened beverages do you drink each day (Examples: soda, juice, sweet tea, etc.  Do NOT count diet or artificially sweetened beverages)?   1    How many days per week do you exercise enough to make your heart beat faster? 3 or less    How many minutes a day do you exercise enough to make your heart beat faster? 9 or less    How many days per week do you miss taking your medication? 0     The patient brought a blood glucose log.  03/18/2020: 64, 76, 95, 108, 112, 124, 113,109.    03/19/20: 111,107,127,93,96, 104, 118, 93 (feeling weak).    The patient had a recent adjustment in his diet. He normally eats out but has not been able to eat well.    Patient was taken off Doxipen and started him  on a light box.  The patient was started on melatonin 1 mg  at bedtime and a light box 1/2 hour in the morning.     Patient Active Problem List   Diagnosis     Obstructive sleep apnea     HYPERLIPIDEMIA LDL GOAL <100     Hypertension goal BP (blood pressure) < 140/90     Advanced directives, counseling/discussion     Migraine     History of diverticulitis     MENTAL HEALTH     Marianne (H)     Bipolar I disorder (H)     Chronic abdominal pain     Anxiety     Other amyloidosis (H)     Insomnia due to medical condition     Narcotic dependence (H)     Obstructive sleep apnea syndrome     Type 2 diabetes mellitus without complication, without long-term current use of insulin (H)     Restless legs syndrome (RLS)     Type 2 diabetes mellitus with other specified complication (H)     Peripheral edema     Morbid obesity (H)     Stasis dermatitis of both legs     Polyneuropathy associated with underlying disease (H)     Acquired lymphedema     Renal failure     Low blood pressure reading     Past Surgical History:   Procedure Laterality Date     BMT PROTOCOL      for systemic amyloidosis     BONE MARROW BIOPSY, BONE SPECIMEN, NEEDLE/TROCAR N/A 6/8/2016    Procedure: BIOPSY BONE MARROW;  Surgeon: Nathan Agrawal MD;  Location:  GI     BONE MARROW BIOPSY, BONE SPECIMEN, NEEDLE/TROCAR N/A 2/20/2019    Procedure: BIOPSY BONE MARROW;  Surgeon: Michael Raygoza MD;  Location:  GI     C NONSPECIFIC PROCEDURE  94    Cholecystectomy     C NONSPECIFIC PROCEDURE  2000    repair deviated septum     CHOLECYSTECTOMY  1995    lap qian     COLONOSCOPY  2012    hx polyps     ESOPHAGOSCOPY, GASTROSCOPY, DUODENOSCOPY (EGD), COMBINED N/A 5/29/2015    Procedure: COMBINED ESOPHAGOSCOPY, GASTROSCOPY, DUODENOSCOPY (EGD), BIOPSY SINGLE OR MULTIPLE;  Surgeon: John Jacob MD;  Location:  GI     HERNIA REPAIR, UMBILICAL  2006       Social History     Tobacco Use     Smoking status: Never Smoker     Smokeless tobacco:  "Never Used   Substance Use Topics     Alcohol use: No     Alcohol/week: 0.0 standard drinks     Family History   Problem Relation Age of Onset     Cerebrovascular Disease Mother      C.A.D. Mother      Hypertension Mother      Alcohol/Drug Mother      Arthritis Mother      Cerebrovascular Disease Maternal Grandmother      C.A.D. Maternal Grandmother      Alcohol/Drug Maternal Grandmother      C.A.D. Father      Heart Disease Father      Hypertension Father      Alcohol/Drug Father      Allergies Father      Circulatory Father      Depression Father      Respiratory Father      Asthma Father      Alcohol/Drug Paternal Grandfather      Cerebrovascular Disease Paternal Grandfather      Depression Son      Psychotic Disorder Son         anxiety disorder     Depression Daughter      Cerebrovascular Disease Maternal Grandfather      Cerebrovascular Disease Paternal Grandmother      Diabetes Brother      Allergies Sister      Depression Sister      Gynecology Sister            Reviewed and updated as needed this visit by Provider       Review of Systems   ROS COMP: Constitutional, HEENT, cardiovascular, pulmonary, gi and gu systems are negative, except as otherwise noted.      Objective    /77   Pulse 72   Temp 97.7  F (36.5  C) (Oral)   Resp 18   Ht 1.702 m (5' 7\")   Wt 121.1 kg (267 lb)   SpO2 96%   BMI 41.82 kg/m    Body mass index is 41.82 kg/m .  Physical Exam   GENERAL: healthy, alert and no distress  RESP: lungs clear to auscultation - no rales, rhonchi or wheezes  CV: regular rate and rhythm, normal S1 S2, no S3 or S4, no murmur, click or rub, no peripheral edema and peripheral pulses strong  MS: no gross musculoskeletal defects noted, no edema.  Stage I pressure ulcer located in the heel of the right foot.  Healing well without complications.  Dry scab present.    Diagnostic Test Results:  Labs reviewed in Epic  No results found for any visits on 03/19/20.        Assessment & Plan   1. Type 2 diabetes " mellitus without complication, without long-term current use of insulin (H)  Most recent hemoglobin A1c was stable.  He recently had made adjustment to his diet.  He was advised to decrease the dose of glipizide to 2.5 mg once daily.    2. Hypoglycemia  - Advised to decrease the dose to Glipizide 2.5mg once daily.     3. Diabetic ulcer of right heel associated with type 2 diabetes mellitus, limited to breakdown of skin (H)  Significant improvement in his lower extremity swelling as well as the pressure ulcer on the right heel.  Advised to continue with compression stockings.        Return in about 1 month (around 4/19/2020).    Vince Henry MD  [facility]

## 2020-03-19 NOTE — TELEPHONE ENCOUNTER
For the past 24 hours, Patient has been noticing low blood sugar readings, following an episode of feeling dizzy and sweaty and weak    Patient is a Type II Diabetic    During the initial episode his blood sugar was 64 yesterday morning fasting, after eating it went up. Then stayed in the low 100s. But the patient is feeling very weak     Had those readings before but has not ever had these symptoms at the same time.      Mostly now the only symptoms he feels is weakness. Patient feels that he has to keep eating to keep it up.     Takes metformin and glipizide. Glipizide was recently increased. Asked patient to hold until he has spoken with a provider today.     Triaged to a disposition of see a provider within 4 hours. If he has not heard back by midday he should call back. Call transferred to scheduling and patient was scheduled for today at 815am.     Additional Information    Negative: Unconscious or difficult to awaken    Negative: Seizure occurs    Negative: Acting confused (e.g., disoriented, slurred speech)    Negative: Very weak (e.g., can't stand)    Negative: Sounds like a life-threatening emergency to the triager    Negative: Patient sounds very sick or weak to the triager    Negative: [1] Low blood sugar symptoms with no other adult present AND [2] hasn't tried Care Advice    Negative: [1] Low blood glucose (< 70 mg/dl  or 3.9 mmol/l) with no other adult present AND [2] hasn't tried Care Advice    Negative: [1] Vomiting AND [2] signs of dehydration (e.g., no urine > 12 hours, very dry mouth, dark urine, etc.)    Negative: [1] Low blood sugar symptoms persist > 30 minutes AND [2] using low blood sugar Care Advice    Negative: [1] Low blood glucose (persists > 30 minutes AND [2] using low blood sugar Care Advice    Protocols used: DIABETES - LOW BLOOD SUGAR-A-    Selam Cline RN on 3/19/2020 at 5:50 AM

## 2020-03-20 ENCOUNTER — MYC MEDICAL ADVICE (OUTPATIENT)
Dept: FAMILY MEDICINE | Facility: CLINIC | Age: 65
End: 2020-03-20

## 2020-03-23 ENCOUNTER — E-VISIT (OUTPATIENT)
Dept: FAMILY MEDICINE | Facility: CLINIC | Age: 65
End: 2020-03-23
Payer: COMMERCIAL

## 2020-03-23 DIAGNOSIS — R20.0 NUMBNESS: Primary | ICD-10-CM

## 2020-03-23 PROCEDURE — 99421 OL DIG E/M SVC 5-10 MIN: CPT | Performed by: FAMILY MEDICINE

## 2020-03-23 NOTE — TELEPHONE ENCOUNTER
"FS,     Called patient.  States once in a while he will wake up in the middle of the night and his left hand in numb. States happens when he lies on his arm/hand.    This am woke up and states his entire left side of his body was numb.    \"It was freaky. I was able to shake it off where it felt better. Went back to bed and it happened I think about 45 to 60 minutes later\"    States numbness is gone    Please advise.  Thanks,  Wandy Pena RN    "

## 2020-03-23 NOTE — TELEPHONE ENCOUNTER
FS,    Please see [employer] message below.  I did call patient to triage Evisit.    Will send my notes next.  Thanks,  Wandy Pena RN

## 2020-03-23 NOTE — TELEPHONE ENCOUNTER
----- Message from Vince Henry MD sent at 3/23/2020  8:11 AM CDT -----  Can you please call Erickson and triage him?    Elaine

## 2020-03-31 ENCOUNTER — MYC MEDICAL ADVICE (OUTPATIENT)
Dept: FAMILY MEDICINE | Facility: CLINIC | Age: 65
End: 2020-03-31

## 2020-03-31 NOTE — TELEPHONE ENCOUNTER
FS    Please see Link To [employer] message below.  Do you want to do an Evisit or telephone visit?    Wandy Pena RN

## 2020-03-31 NOTE — TELEPHONE ENCOUNTER
Imaging received via push from [facility] to  PACS & is now viewable in [facility].   FEVER/COUGH

## 2020-04-01 ENCOUNTER — TRANSFERRED RECORDS (OUTPATIENT)
Dept: HEALTH INFORMATION MANAGEMENT | Facility: CLINIC | Age: 65
End: 2020-04-01

## 2020-04-02 ENCOUNTER — MYC MEDICAL ADVICE (OUTPATIENT)
Dept: FAMILY MEDICINE | Facility: CLINIC | Age: 65
End: 2020-04-02

## 2020-04-15 ENCOUNTER — HOSPITAL ENCOUNTER (OUTPATIENT)
Facility: CLINIC | Age: 65
Setting detail: OBSERVATION
Discharge: HOME | End: 2020-04-17
Attending: EMERGENCY MEDICINE | Admitting: INTERNAL MEDICINE
Payer: COMMERCIAL

## 2020-04-15 ENCOUNTER — APPOINTMENT (OUTPATIENT)
Dept: CT IMAGING | Facility: CLINIC | Age: 65
End: 2020-04-15
Attending: EMERGENCY MEDICINE
Payer: COMMERCIAL

## 2020-04-15 DIAGNOSIS — R10.84 ABDOMINAL PAIN, GENERALIZED: ICD-10-CM

## 2020-04-15 DIAGNOSIS — R19.7 DIARRHEA, UNSPECIFIED TYPE: ICD-10-CM

## 2020-04-15 PROBLEM — G25.81 RESTLESS LEGS SYNDROME (RLS): Status: ACTIVE | Noted: 2017-06-29

## 2020-04-15 PROBLEM — E66.01 MORBID OBESITY (H): Status: ACTIVE | Noted: 2018-08-28

## 2020-04-15 LAB
ALBUMIN UR-MCNC: NEGATIVE MG/DL
ANION GAP SERPL CALCULATED.3IONS-SCNC: 10 MMOL/L (ref 3–14)
APPEARANCE UR: CLEAR
BASOPHILS # BLD AUTO: 0 10E9/L (ref 0–0.2)
BASOPHILS NFR BLD AUTO: 0.3 %
BILIRUB UR QL STRIP: NEGATIVE
BUN SERPL-MCNC: 16 MG/DL (ref 7–30)
CALCIUM SERPL-MCNC: 8.6 MG/DL (ref 8.5–10.1)
CHLORIDE SERPL-SCNC: 100 MMOL/L (ref 94–109)
CO2 SERPL-SCNC: 21 MMOL/L (ref 20–32)
COLOR UR AUTO: YELLOW
CREAT SERPL-MCNC: 1.18 MG/DL (ref 0.66–1.25)
DIFFERENTIAL METHOD BLD: ABNORMAL
EOSINOPHIL # BLD AUTO: 0.2 10E9/L (ref 0–0.7)
EOSINOPHIL NFR BLD AUTO: 2.4 %
ERYTHROCYTE [DISTWIDTH] IN BLOOD BY AUTOMATED COUNT: 14.5 % (ref 10–15)
GFR SERPL CREATININE-BSD FRML MDRD: 65 ML/MIN/{1.73_M2}
GLUCOSE BLDC GLUCOMTR-MCNC: 121 MG/DL (ref 70–99)
GLUCOSE SERPL-MCNC: 131 MG/DL (ref 70–99)
GLUCOSE UR STRIP-MCNC: NEGATIVE MG/DL
HCT VFR BLD AUTO: 40 % (ref 40–53)
HGB BLD-MCNC: 14.5 G/DL (ref 13.3–17.7)
HGB UR QL STRIP: NEGATIVE
HYALINE CASTS #/AREA URNS LPF: 1 /LPF (ref 0–2)
IMM GRANULOCYTES # BLD: 0 10E9/L (ref 0–0.4)
IMM GRANULOCYTES NFR BLD: 0.3 %
KETONES UR STRIP-MCNC: NEGATIVE MG/DL
LEUKOCYTE ESTERASE UR QL STRIP: NEGATIVE
LYMPHOCYTES # BLD AUTO: 0.7 10E9/L (ref 0.8–5.3)
LYMPHOCYTES NFR BLD AUTO: 10.9 %
MCH RBC QN AUTO: 34.1 PG (ref 26.5–33)
MCHC RBC AUTO-ENTMCNC: 36.3 G/DL (ref 31.5–36.5)
MCV RBC AUTO: 94 FL (ref 78–100)
MONOCYTES # BLD AUTO: 0.7 10E9/L (ref 0–1.3)
MONOCYTES NFR BLD AUTO: 10.8 %
NEUTROPHILS # BLD AUTO: 4.8 10E9/L (ref 1.6–8.3)
NEUTROPHILS NFR BLD AUTO: 75.3 %
NITRATE UR QL: NEGATIVE
NRBC # BLD AUTO: 0 10*3/UL
NRBC BLD AUTO-RTO: 0 /100
PH UR STRIP: 5.5 PH (ref 5–7)
PLATELET # BLD AUTO: 170 10E9/L (ref 150–450)
POTASSIUM SERPL-SCNC: 3.6 MMOL/L (ref 3.4–5.3)
RBC # BLD AUTO: 4.25 10E12/L (ref 4.4–5.9)
RBC #/AREA URNS AUTO: 0 /HPF (ref 0–2)
SODIUM SERPL-SCNC: 131 MMOL/L (ref 133–144)
SOURCE: NORMAL
SP GR UR STRIP: 1.03 (ref 1–1.03)
UROBILINOGEN UR STRIP-MCNC: 2 MG/DL (ref 0–2)
WBC # BLD AUTO: 6.4 10E9/L (ref 4–11)
WBC #/AREA URNS AUTO: 2 /HPF (ref 0–5)

## 2020-04-15 PROCEDURE — 87635 SARS-COV-2 COVID-19 AMP PRB: CPT | Performed by: EMERGENCY MEDICINE

## 2020-04-15 PROCEDURE — 25800030 ZZH RX IP 258 OP 636: Performed by: EMERGENCY MEDICINE

## 2020-04-15 PROCEDURE — 99285 EMERGENCY DEPT VISIT HI MDM: CPT | Mod: 25

## 2020-04-15 PROCEDURE — 96376 TX/PRO/DX INJ SAME DRUG ADON: CPT

## 2020-04-15 PROCEDURE — 85025 COMPLETE CBC W/AUTO DIFF WBC: CPT | Performed by: EMERGENCY MEDICINE

## 2020-04-15 PROCEDURE — 25000128 H RX IP 250 OP 636: Performed by: EMERGENCY MEDICINE

## 2020-04-15 PROCEDURE — 80048 BASIC METABOLIC PNL TOTAL CA: CPT | Performed by: EMERGENCY MEDICINE

## 2020-04-15 PROCEDURE — 25800030 ZZH RX IP 258 OP 636: Performed by: INTERNAL MEDICINE

## 2020-04-15 PROCEDURE — 96361 HYDRATE IV INFUSION ADD-ON: CPT

## 2020-04-15 PROCEDURE — 00000146 ZZHCL STATISTIC GLUCOSE BY METER IP

## 2020-04-15 PROCEDURE — G0378 HOSPITAL OBSERVATION PER HR: HCPCS

## 2020-04-15 PROCEDURE — 96374 THER/PROPH/DIAG INJ IV PUSH: CPT | Mod: 59

## 2020-04-15 PROCEDURE — 25000128 H RX IP 250 OP 636: Performed by: INTERNAL MEDICINE

## 2020-04-15 PROCEDURE — 25000125 ZZHC RX 250: Performed by: EMERGENCY MEDICINE

## 2020-04-15 PROCEDURE — 99220 ZZC INITIAL OBSERVATION CARE,LEVL III: CPT | Performed by: INTERNAL MEDICINE

## 2020-04-15 PROCEDURE — 96375 TX/PRO/DX INJ NEW DRUG ADDON: CPT

## 2020-04-15 PROCEDURE — 74177 CT ABD & PELVIS W/CONTRAST: CPT

## 2020-04-15 PROCEDURE — 81001 URINALYSIS AUTO W/SCOPE: CPT | Performed by: EMERGENCY MEDICINE

## 2020-04-15 RX ORDER — HYDROMORPHONE HYDROCHLORIDE 1 MG/ML
0.5 INJECTION, SOLUTION INTRAMUSCULAR; INTRAVENOUS; SUBCUTANEOUS
Status: DISCONTINUED | OUTPATIENT
Start: 2020-04-15 | End: 2020-04-16

## 2020-04-15 RX ORDER — ACETAMINOPHEN 325 MG/1
650 TABLET ORAL EVERY 4 HOURS PRN
Status: DISCONTINUED | OUTPATIENT
Start: 2020-04-15 | End: 2020-04-17 | Stop reason: HOSPADM

## 2020-04-15 RX ORDER — IOPAMIDOL 755 MG/ML
134 INJECTION, SOLUTION INTRAVASCULAR ONCE
Status: COMPLETED | OUTPATIENT
Start: 2020-04-15 | End: 2020-04-15

## 2020-04-15 RX ORDER — DEXTROSE MONOHYDRATE 25 G/50ML
25-50 INJECTION, SOLUTION INTRAVENOUS
Status: DISCONTINUED | OUTPATIENT
Start: 2020-04-15 | End: 2020-04-17 | Stop reason: HOSPADM

## 2020-04-15 RX ORDER — GLIPIZIDE 2.5 MG/1
2.5 TABLET, EXTENDED RELEASE ORAL DAILY
Status: DISCONTINUED | OUTPATIENT
Start: 2020-04-16 | End: 2020-04-17 | Stop reason: HOSPADM

## 2020-04-15 RX ORDER — SODIUM CHLORIDE 9 MG/ML
1000 INJECTION, SOLUTION INTRAVENOUS CONTINUOUS
Status: DISCONTINUED | OUTPATIENT
Start: 2020-04-15 | End: 2020-04-16

## 2020-04-15 RX ORDER — LIDOCAINE 40 MG/G
CREAM TOPICAL
Status: DISCONTINUED | OUTPATIENT
Start: 2020-04-15 | End: 2020-04-17 | Stop reason: HOSPADM

## 2020-04-15 RX ORDER — MORPHINE SULFATE 4 MG/ML
4 INJECTION, SOLUTION INTRAMUSCULAR; INTRAVENOUS ONCE
Status: COMPLETED | OUTPATIENT
Start: 2020-04-15 | End: 2020-04-15

## 2020-04-15 RX ORDER — ONDANSETRON 2 MG/ML
4 INJECTION INTRAMUSCULAR; INTRAVENOUS EVERY 30 MIN PRN
Status: DISCONTINUED | OUTPATIENT
Start: 2020-04-15 | End: 2020-04-16

## 2020-04-15 RX ORDER — ACETAMINOPHEN 650 MG/1
650 SUPPOSITORY RECTAL EVERY 4 HOURS PRN
Status: DISCONTINUED | OUTPATIENT
Start: 2020-04-15 | End: 2020-04-17 | Stop reason: HOSPADM

## 2020-04-15 RX ORDER — PREGABALIN 100 MG/1
100 CAPSULE ORAL 4 TIMES DAILY
Status: DISCONTINUED | OUTPATIENT
Start: 2020-04-16 | End: 2020-04-17 | Stop reason: HOSPADM

## 2020-04-15 RX ORDER — LABETALOL HYDROCHLORIDE 5 MG/ML
10 INJECTION, SOLUTION INTRAVENOUS
Status: DISCONTINUED | OUTPATIENT
Start: 2020-04-15 | End: 2020-04-17 | Stop reason: HOSPADM

## 2020-04-15 RX ORDER — DOXEPIN HYDROCHLORIDE 25 MG/1
50 CAPSULE ORAL AT BEDTIME
Status: DISCONTINUED | OUTPATIENT
Start: 2020-04-15 | End: 2020-04-16

## 2020-04-15 RX ORDER — NICOTINE POLACRILEX 4 MG
15-30 LOZENGE BUCCAL
Status: DISCONTINUED | OUTPATIENT
Start: 2020-04-15 | End: 2020-04-17 | Stop reason: HOSPADM

## 2020-04-15 RX ORDER — ASPIRIN 81 MG/1
81 TABLET ORAL DAILY
Status: DISCONTINUED | OUTPATIENT
Start: 2020-04-16 | End: 2020-04-17 | Stop reason: HOSPADM

## 2020-04-15 RX ORDER — TAMSULOSIN HYDROCHLORIDE 0.4 MG/1
0.8 CAPSULE ORAL DAILY
Status: DISCONTINUED | OUTPATIENT
Start: 2020-04-16 | End: 2020-04-17 | Stop reason: HOSPADM

## 2020-04-15 RX ORDER — DIVALPROEX SODIUM 500 MG/1
1000 TABLET, DELAYED RELEASE ORAL DAILY
Status: DISCONTINUED | OUTPATIENT
Start: 2020-04-16 | End: 2020-04-17 | Stop reason: HOSPADM

## 2020-04-15 RX ORDER — MORPHINE SULFATE 4 MG/ML
6 INJECTION, SOLUTION INTRAMUSCULAR; INTRAVENOUS
Status: COMPLETED | OUTPATIENT
Start: 2020-04-15 | End: 2020-04-15

## 2020-04-15 RX ORDER — ONDANSETRON 4 MG/1
4 TABLET, ORALLY DISINTEGRATING ORAL EVERY 6 HOURS PRN
Status: DISCONTINUED | OUTPATIENT
Start: 2020-04-15 | End: 2020-04-17 | Stop reason: HOSPADM

## 2020-04-15 RX ORDER — SODIUM CHLORIDE, SODIUM LACTATE, POTASSIUM CHLORIDE, CALCIUM CHLORIDE 600; 310; 30; 20 MG/100ML; MG/100ML; MG/100ML; MG/100ML
INJECTION, SOLUTION INTRAVENOUS CONTINUOUS
Status: DISCONTINUED | OUTPATIENT
Start: 2020-04-15 | End: 2020-04-16

## 2020-04-15 RX ORDER — NALOXONE HYDROCHLORIDE 0.4 MG/ML
.1-.4 INJECTION, SOLUTION INTRAMUSCULAR; INTRAVENOUS; SUBCUTANEOUS
Status: DISCONTINUED | OUTPATIENT
Start: 2020-04-15 | End: 2020-04-17 | Stop reason: HOSPADM

## 2020-04-15 RX ORDER — ONDANSETRON 2 MG/ML
4 INJECTION INTRAMUSCULAR; INTRAVENOUS EVERY 6 HOURS PRN
Status: DISCONTINUED | OUTPATIENT
Start: 2020-04-15 | End: 2020-04-17 | Stop reason: HOSPADM

## 2020-04-15 RX ADMIN — IOPAMIDOL 134 ML: 755 INJECTION, SOLUTION INTRAVENOUS at 21:26

## 2020-04-15 RX ADMIN — MORPHINE SULFATE 6 MG: 4 INJECTION INTRAVENOUS at 20:32

## 2020-04-15 RX ADMIN — SODIUM CHLORIDE, POTASSIUM CHLORIDE, SODIUM LACTATE AND CALCIUM CHLORIDE: 600; 310; 30; 20 INJECTION, SOLUTION INTRAVENOUS at 23:56

## 2020-04-15 RX ADMIN — SODIUM CHLORIDE 78 ML: 9 INJECTION, SOLUTION INTRAVENOUS at 21:25

## 2020-04-15 RX ADMIN — HYDROMORPHONE HYDROCHLORIDE 0.5 MG: 1 INJECTION, SOLUTION INTRAMUSCULAR; INTRAVENOUS; SUBCUTANEOUS at 23:56

## 2020-04-15 RX ADMIN — SODIUM CHLORIDE 1000 ML: 9 INJECTION, SOLUTION INTRAVENOUS at 22:38

## 2020-04-15 RX ADMIN — SODIUM CHLORIDE 1000 ML: 9 INJECTION, SOLUTION INTRAVENOUS at 20:28

## 2020-04-15 RX ADMIN — MORPHINE SULFATE 4 MG: 4 INJECTION INTRAVENOUS at 22:45

## 2020-04-15 RX ADMIN — MORPHINE SULFATE 4 MG: 4 INJECTION INTRAVENOUS at 21:39

## 2020-04-15 RX ADMIN — ONDANSETRON 4 MG: 2 INJECTION INTRAMUSCULAR; INTRAVENOUS at 20:33

## 2020-04-15 ASSESSMENT — ENCOUNTER SYMPTOMS
VOMITING: 0
UNEXPECTED WEIGHT CHANGE: 1
FEVER: 0
DIARRHEA: 1
ABDOMINAL PAIN: 1

## 2020-04-16 LAB
ALBUMIN SERPL-MCNC: 3 G/DL (ref 3.4–5)
ALP SERPL-CCNC: 81 U/L (ref 40–150)
ALT SERPL W P-5'-P-CCNC: 55 U/L (ref 0–70)
ANION GAP SERPL CALCULATED.3IONS-SCNC: 7 MMOL/L (ref 3–14)
AST SERPL W P-5'-P-CCNC: 19 U/L (ref 0–45)
BILIRUB SERPL-MCNC: 1 MG/DL (ref 0.2–1.3)
BUN SERPL-MCNC: 15 MG/DL (ref 7–30)
CALCIUM SERPL-MCNC: 8.2 MG/DL (ref 8.5–10.1)
CHLORIDE SERPL-SCNC: 105 MMOL/L (ref 94–109)
CO2 SERPL-SCNC: 24 MMOL/L (ref 20–32)
CREAT SERPL-MCNC: 1.2 MG/DL (ref 0.66–1.25)
ERYTHROCYTE [DISTWIDTH] IN BLOOD BY AUTOMATED COUNT: 14.6 % (ref 10–15)
GFR SERPL CREATININE-BSD FRML MDRD: 64 ML/MIN/{1.73_M2}
GLUCOSE BLDC GLUCOMTR-MCNC: 114 MG/DL (ref 70–99)
GLUCOSE BLDC GLUCOMTR-MCNC: 117 MG/DL (ref 70–99)
GLUCOSE BLDC GLUCOMTR-MCNC: 127 MG/DL (ref 70–99)
GLUCOSE BLDC GLUCOMTR-MCNC: 127 MG/DL (ref 70–99)
GLUCOSE BLDC GLUCOMTR-MCNC: 136 MG/DL (ref 70–99)
GLUCOSE BLDC GLUCOMTR-MCNC: 157 MG/DL (ref 70–99)
GLUCOSE BLDC GLUCOMTR-MCNC: 95 MG/DL (ref 70–99)
GLUCOSE SERPL-MCNC: 114 MG/DL (ref 70–99)
HBA1C MFR BLD: 5.5 % (ref 0–5.6)
HCT VFR BLD AUTO: 36.7 % (ref 40–53)
HGB BLD-MCNC: 12.8 G/DL (ref 13.3–17.7)
MCH RBC QN AUTO: 33.2 PG (ref 26.5–33)
MCHC RBC AUTO-ENTMCNC: 34.9 G/DL (ref 31.5–36.5)
MCV RBC AUTO: 95 FL (ref 78–100)
PLATELET # BLD AUTO: 143 10E9/L (ref 150–450)
POTASSIUM SERPL-SCNC: 3.8 MMOL/L (ref 3.4–5.3)
PROT SERPL-MCNC: 5.6 G/DL (ref 6.8–8.8)
RBC # BLD AUTO: 3.85 10E12/L (ref 4.4–5.9)
SARS-COV-2 PCR COMMENT: NORMAL
SARS-COV-2 RNA SPEC QL NAA+PROBE: NEGATIVE
SARS-COV-2 RNA SPEC QL NAA+PROBE: NORMAL
SODIUM SERPL-SCNC: 136 MMOL/L (ref 133–144)
SPECIMEN SOURCE: NORMAL
SPECIMEN SOURCE: NORMAL
WBC # BLD AUTO: 5.4 10E9/L (ref 4–11)

## 2020-04-16 PROCEDURE — 25000128 H RX IP 250 OP 636: Performed by: INTERNAL MEDICINE

## 2020-04-16 PROCEDURE — 36415 COLL VENOUS BLD VENIPUNCTURE: CPT | Performed by: INTERNAL MEDICINE

## 2020-04-16 PROCEDURE — 00000146 ZZHCL STATISTIC GLUCOSE BY METER IP

## 2020-04-16 PROCEDURE — 99225 ZZC SUBSEQUENT OBSERVATION CARE,LEVEL II: CPT | Performed by: INTERNAL MEDICINE

## 2020-04-16 PROCEDURE — 25800030 ZZH RX IP 258 OP 636: Performed by: INTERNAL MEDICINE

## 2020-04-16 PROCEDURE — 99215 OFFICE O/P EST HI 40 MIN: CPT | Performed by: INTERNAL MEDICINE

## 2020-04-16 PROCEDURE — 80165 DIPROPYLACETIC ACID FREE: CPT | Performed by: INTERNAL MEDICINE

## 2020-04-16 PROCEDURE — 85027 COMPLETE CBC AUTOMATED: CPT | Performed by: INTERNAL MEDICINE

## 2020-04-16 PROCEDURE — 80053 COMPREHEN METABOLIC PANEL: CPT | Performed by: INTERNAL MEDICINE

## 2020-04-16 PROCEDURE — 83036 HEMOGLOBIN GLYCOSYLATED A1C: CPT | Performed by: INTERNAL MEDICINE

## 2020-04-16 PROCEDURE — 96376 TX/PRO/DX INJ SAME DRUG ADON: CPT

## 2020-04-16 PROCEDURE — G0378 HOSPITAL OBSERVATION PER HR: HCPCS

## 2020-04-16 PROCEDURE — 96375 TX/PRO/DX INJ NEW DRUG ADDON: CPT

## 2020-04-16 PROCEDURE — 25000132 ZZH RX MED GY IP 250 OP 250 PS 637: Performed by: INTERNAL MEDICINE

## 2020-04-16 PROCEDURE — 96372 THER/PROPH/DIAG INJ SC/IM: CPT

## 2020-04-16 PROCEDURE — 96361 HYDRATE IV INFUSION ADD-ON: CPT

## 2020-04-16 PROCEDURE — 25000131 ZZH RX MED GY IP 250 OP 636 PS 637: Performed by: INTERNAL MEDICINE

## 2020-04-16 RX ORDER — HYDROMORPHONE HYDROCHLORIDE 1 MG/ML
0.5 INJECTION, SOLUTION INTRAMUSCULAR; INTRAVENOUS; SUBCUTANEOUS ONCE
Status: COMPLETED | OUTPATIENT
Start: 2020-04-16 | End: 2020-04-16

## 2020-04-16 RX ORDER — MODAFINIL 200 MG/1
200 TABLET ORAL EVERY MORNING
Status: DISCONTINUED | OUTPATIENT
Start: 2020-04-17 | End: 2020-04-17 | Stop reason: HOSPADM

## 2020-04-16 RX ORDER — LURASIDONE HYDROCHLORIDE 40 MG/1
40 TABLET, FILM COATED ORAL AT BEDTIME
Status: DISCONTINUED | OUTPATIENT
Start: 2020-04-16 | End: 2020-04-17 | Stop reason: HOSPADM

## 2020-04-16 RX ORDER — LISINOPRIL 20 MG/1
20 TABLET ORAL DAILY
Status: DISCONTINUED | OUTPATIENT
Start: 2020-04-16 | End: 2020-04-17 | Stop reason: HOSPADM

## 2020-04-16 RX ORDER — HYDROMORPHONE HYDROCHLORIDE 1 MG/ML
.5-1 INJECTION, SOLUTION INTRAMUSCULAR; INTRAVENOUS; SUBCUTANEOUS
Status: DISCONTINUED | OUTPATIENT
Start: 2020-04-16 | End: 2020-04-17 | Stop reason: HOSPADM

## 2020-04-16 RX ADMIN — HYDROMORPHONE HYDROCHLORIDE 1 MG: 1 INJECTION, SOLUTION INTRAMUSCULAR; INTRAVENOUS; SUBCUTANEOUS at 13:49

## 2020-04-16 RX ADMIN — HYDROMORPHONE HYDROCHLORIDE 1 MG: 1 INJECTION, SOLUTION INTRAMUSCULAR; INTRAVENOUS; SUBCUTANEOUS at 16:51

## 2020-04-16 RX ADMIN — INSULIN ASPART 1 UNITS: 100 INJECTION, SOLUTION INTRAVENOUS; SUBCUTANEOUS at 19:29

## 2020-04-16 RX ADMIN — HYDROMORPHONE HYDROCHLORIDE 1 MG: 1 INJECTION, SOLUTION INTRAMUSCULAR; INTRAVENOUS; SUBCUTANEOUS at 04:24

## 2020-04-16 RX ADMIN — ASPIRIN 81 MG: 81 TABLET, DELAYED RELEASE ORAL at 08:19

## 2020-04-16 RX ADMIN — LISINOPRIL 20 MG: 20 TABLET ORAL at 16:46

## 2020-04-16 RX ADMIN — LURASIDONE HYDROCHLORIDE 40 MG: 40 TABLET, FILM COATED ORAL at 21:26

## 2020-04-16 RX ADMIN — PREGABALIN 100 MG: 100 CAPSULE ORAL at 08:41

## 2020-04-16 RX ADMIN — HYDROMORPHONE HYDROCHLORIDE 1 MG: 1 INJECTION, SOLUTION INTRAMUSCULAR; INTRAVENOUS; SUBCUTANEOUS at 02:33

## 2020-04-16 RX ADMIN — DIVALPROEX SODIUM 1000 MG: 500 TABLET, DELAYED RELEASE ORAL at 08:20

## 2020-04-16 RX ADMIN — PREGABALIN 100 MG: 100 CAPSULE ORAL at 11:10

## 2020-04-16 RX ADMIN — HYDROMORPHONE HYDROCHLORIDE 1 MG: 1 INJECTION, SOLUTION INTRAMUSCULAR; INTRAVENOUS; SUBCUTANEOUS at 11:10

## 2020-04-16 RX ADMIN — HYDROMORPHONE HYDROCHLORIDE 0.5 MG: 1 INJECTION, SOLUTION INTRAMUSCULAR; INTRAVENOUS; SUBCUTANEOUS at 01:42

## 2020-04-16 RX ADMIN — PREGABALIN 100 MG: 100 CAPSULE ORAL at 16:46

## 2020-04-16 RX ADMIN — ONDANSETRON 4 MG: 2 INJECTION INTRAMUSCULAR; INTRAVENOUS at 05:10

## 2020-04-16 RX ADMIN — HYDROMORPHONE HYDROCHLORIDE 1 MG: 1 INJECTION, SOLUTION INTRAMUSCULAR; INTRAVENOUS; SUBCUTANEOUS at 07:53

## 2020-04-16 RX ADMIN — SODIUM CHLORIDE, POTASSIUM CHLORIDE, SODIUM LACTATE AND CALCIUM CHLORIDE: 600; 310; 30; 20 INJECTION, SOLUTION INTRAVENOUS at 11:16

## 2020-04-16 RX ADMIN — PREGABALIN 100 MG: 100 CAPSULE ORAL at 21:26

## 2020-04-16 RX ADMIN — TAMSULOSIN HYDROCHLORIDE 0.8 MG: 0.4 CAPSULE ORAL at 08:19

## 2020-04-16 NOTE — CONSULTS
Care Coordination:    Care Transition Initial Assessment - RN    Pt admitted as observation, Brochure given. Pt voices understanding.  Initial consult was for SW to set up follow up with Pain clinic.. CC RN is who manages those follow up's.  Per Charge nurse and Pt's bedside nurse Pain clinic ([facility]) is coming today at 2pm to fill pain pump per consultation with Pharmacy/pain team.  Informed Pt of this and he does not feel he will need follow up with them at this time (since purpose of visit was to fill pump).    Still awaiting Hematology consult. Pt is still having pain/nausea.  CC to follow for discharge needs.    Pt may need help finding transportation home.  If daughter can't come would prefer to use [facility] transport vs a taxi/uber.  Aware of cost.    May need help arranging other follow up visits pending recommendations.       Met with: Patient.  DATA   Active Problems:    Hyperlipidemia LDL goal <100    Hypertension goal BP (blood pressure) < 140/90    Bipolar I disorder (H)    Chronic abdominal pain    Other amyloidosis (H)    Narcotic dependence (H)    Type 2 diabetes mellitus without complication, without long-term current use of insulin (H)    Restless legs syndrome (RLS)    Morbid obesity (H)    Other cirrhosis of liver (H) possibly from vences     Diarrhea of presumed infectious origin    Diarrhea       Cognitive Status: awake, alert and oriented.  Primary Care Clinic Name: [facility]  Primary Care MD Name: Elaine  Contact information and PCP information verified: Yes  Lives With: alone                     Insurance concerns: No Insurance issues identified  ASSESSMENT  Patient currently receives the following services:  Pt lives indep and drive.  Reports he has daughter and friends who check on him.        Identified issues/concerns regarding health management: Pt admitted with diarrhea, nausea, pain.  Diarrhea has stopped, unable to obtain stool sample.  GI consulted and recommend f/u as outpatient.   Hematology to see.    Notes indicate Pt needs pain clinic follow up.  See notes above regarding Pump being filled here.     PLAN  Financial costs for the patient include TBD   Patient given options and choices for discharge yes .  Patient/family is agreeable to the plan?  Yes:   Patient anticipates discharging to home .        Patient anticipates needs for home equipment: No  Transportation/person available to transport on day of discharge  is TBD and have they been notified/set up daughter vs setting up ride.   Plan/Disposition: Home   Appointments: TBD- at this time Pt does not feel he needs help making these appointments.       Care  (CTS) will continue to follow as needed.    Tonya Gonzalez RN BSN  Inpatient Care Coordination  [facility]  [phone]

## 2020-04-16 NOTE — ED NOTES
[facility]  ED Nurse Handoff Report    ED Chief complaint: Diarrhea      ED Diagnosis:   Final diagnoses:   Diarrhea, unspecified type   Abdominal pain, generalized       Code Status: see admitting MD    Allergies:   Allergies   Allergen Reactions     Cephalexin Diarrhea     Liraglutide Other (See Comments)     Sulfa Drugs Swelling     Pt has taken  Taken sulfa drugs orally without trouble. He had problems with Sulfa eye drops. Eye swelled up     Victoza      Increased migraine frequency and severity       Patient Story: 64 year old male presents to the ED via ambulance for weakness and pain due to diarrhea for 10 days.  Focused Assessment:  Pt reports he has discontinued taking his medications for diabetes and HTN because they both make him sick during these 10 days of diarrhea.     Treatments and/or interventions provided: 6 mg morphine IV, 4 mg morphine IV, 1 L NS bolus, 4 mg zofran IV, labs and CT  Patient's response to treatments and/or interventions: unable to relieve chronic back pain and abd pain.    To be done/followed up on inpatient unit:  nothing noted at this time    Does this patient have any cognitive concerns?: none    Activity level - Baseline/Home:  Independent  Activity Level - Current:   Independent    Patient's Preferred language: English   Needed?: No    Isolation: None  Infection: Not Applicable  covid 19 test pending  Bariatric?: No    Vital Signs:   Vitals:    04/15/20 2003   BP: (!) 177/109   Pulse: 92   Resp: 18   SpO2: 95%       Cardiac Rhythm:     Was the PSS-3 completed:   Yes  What interventions are required if any?               Family Comments: none  OBS brochure/video discussed/provided to patient/family: Yes              Name of person given brochure if not patient: na              Relationship to patient: na    For the majority of the shift this patient's behavior was Green.   Behavioral interventions performed were none needed.    ED NURSE PHONE  NUMBER: [phone]

## 2020-04-16 NOTE — DISCHARGE INSTRUCTIONS
Follow up with [facility] on Wed 6/17 at 10am [facility]  [phone]    Follow-up with Hematology/Oncology in 6 months in clinic with labs and abdominal US. ([phone])    [facility] will arrange follow up ([phone])

## 2020-04-16 NOTE — PLAN OF CARE
DATE & TIME: 04/16/20 7660-4142    Cognitive Concerns/ Orientation : alert/oriented x 4   BEHAVIOR & AGGRESSION TOOL COLOR: green  CIWA SCORE: n/a   ABNL VS/O2:   MOBILITY: Independent in room  PAIN MANAGMENT: chronic pain, dilaudid available/requested every 2 hours, IV Zofran given x 1 for nausea up arrival at unit  DIET: Moderate carbohydrate with carbohydrate count  BOWEL/BLADDER: Up to bathroom, no BM this shift  ABNL LAB/BG: BG 95  DRAIN/DEVICES: R PIV LR at 75 mL/hour  TELEMETRY RHYTHM: N/A  SKIN: intact  TESTS/PROCEDURES: COVID result negative  D/C DAY/GOALS/PLACE: pending  OTHER IMPORTANT INFO: Stool sample to r/o C. Diff. Enteric precautions maintained. Pt transferred from obs

## 2020-04-16 NOTE — ED NOTES
RN rounding on pt. Pt reports to RN pain is a 9/10 following last dose of Morphine. MD aware. Pt sitting on ED cart on telephone. Pt's VSS. Pt requesting more pain medication at this time. MD and Primary RN notified.

## 2020-04-16 NOTE — PHARMACY
Spoke with Kera at [facility] and confirmed medications that are placed in patient's pain pump.     Fill in all known information      Medications in Pump:    fentanyl 2000mcg/mL    Bupivacaine 20mg/mL    Morphine 5.8mg/mL    Clinic Responsible for pump medications: [facility]    Rate:    Fentanyl 900mcg/day    Bupivacaine 9mg/day    Morphine 2.6mg/day      Angella LloydD

## 2020-04-16 NOTE — CONSULTS
Consult Date:  04/16/2020      HEMATOLOGY/ONCOLOGY CONSULTATION      This consult has been requested by Dr. Sonali Araya for his splenic lesion suspicious for hemangioma/infarction.      Mr. Hernández is a 64-year-old gentleman who previously has been seen in Hematology/Oncology Clinic for amyloidosis.  The patient was diagnosed with AL kappa amyloidosis in 2016.  He received chemotherapy with Cytoxan, Velcade and dexamethasone followed by autologous stem cell transplant in 12/2016 at [facility].  He is in remission.      The patient has not been feeling well for at least the last 10 days.  The patient has worsening diarrhea.  With diarrhea, his appetite is decreased.  He has lost some weight.  He also has mild abdominal discomfort.        Because of these complaints, he was brought to the emergency room yesterday and had multiple investigations done on 04/15/2020.   -Normal WBC, hemoglobin and platelets.   -Normal potassium and creatinine.   -UA does not reveal any infection.   -COVID-19 has been ruled out.   -CT abdomen and pelvis reveals liver cirrhosis and portal venous hypertension with splenomegaly.  There is hypodensity in the lateral aspect of the spleen, which could be hemangioma or splenic infarct.  Spleen is enlarged at 16 cm.  There are multiple small bilateral pelvic lymph nodes, likely reactive.      I reviewed his previous CT scan done on 01/26/2018.  There was fatty infiltration of the liver.  There is a mildly enlarged spleen.      The patient has been admitted for diarrhea.  He has been seen by a gastroenterologist.      The patient previously had GI workup done.  Colonoscopy on 08/01/2018 revealed colorectal polyps.  Pathology revealed tubular adenoma.      REVIEW OF SYSTEMS:  The patient does not feel well because of diarrhea.  His diarrhea started about 10 days ago and has not been improving.  It is a watery diarrhea.  There is abdominal discomfort.  No severe abdominal pain.  Some  nausea.  No vomiting.  Appetite has been decreased.  He has lost weight because of his diarrhea.      He feels weak.  No headache.  No dizziness.  No chest pain.  No shortness of breath.  No cough.  No fever in the hospital.      All other review of systems negative.      ALLERGIES:  REVIEWED.      MEDICATIONS:  Reviewed.      PAST MEDICAL HISTORY:   1.  Amyloidosis, as described above.   2.  Diabetes mellitus.   3.  Hypertension.   4.  Hypercholesterolemia.   5.  Sleep apnea.   6.  ALLERGIES.   7.  Bipolar disorder.   8.  Cholecystectomy.   9.  Hernia repair.      SOCIAL HISTORY:   -No history of smoking.   -No alcohol use.      PHYSICAL EXAMINATION:   GENERAL:  He is alert and oriented x 3.   VITAL SIGNS:  Reviewed. Not in any acute distress.   Rest of the systems not examined.      LABORATORY DATA:  Reviewed.      ASSESSMENT:   1.  A 64-year-old gentleman with mild splenomegaly and indeterminate hypodensity in the spleen.  This is stable since 07/21/2018 CT done in [facility]. Likely hemangioma.   2.  AL kappa amyloidosis, status post autologous stem cell transplant.  He is in remission.   3.  Diarrhea, likely of infectious origin as this is of acute onset.   4.  Liver cirrhosis secondary to hepatic steatosis. Liver cirrhosis causing splenomegaly.   5.  Mild anemia and thrombocytopenia.  This is chronic from his chemotherapy and transplant.      RECOMMENDATIONS:   1.  I discussed with him regarding imaging study.  CT scan reveals liver cirrhosis.  Because of that, there is splenomegaly.  In the spleen, there is hypodensity in the lateral aspect.      I reviewed CT scan which was done in [facility] in 2018.  In that they mentioned, there is a small hypodensity.  This is likely hemangioma.        I explained to the patient that he has mild splenomegaly from liver cirrhosis.  There is hypodensity is stable, likely hemangioma.  I am not suspicious of any cancer.  At this time, no need for  any further workup.  In the next 6 months, we will plan on getting imaging studies either CT scan or ultrasound. Scans needed for surveillance for liver cancer.     2.  The patient has been admitted with acute diarrhea.  I told him this is nothing to do with his mild splenomegaly.  Diarrhea is likely of infectious origin.  GI is following.     3.  He has amyloidosis and he is in remission. He follows up at [facility] once a year, which he will continue to do.     4.  Labs were all reviewed.  He has mild anemia and thrombocytopenia.  This is due to his transplant.  His anemia and thrombocytopenia are mild. It will be monitored.     5.  The patient had a few questions, which were all answered.  He is frustrated because of his diarrhea.  I am hoping he will improve over the next few days.  Oncology will sign off.  We will see him in clinic in 6 months.  Please call us with any questions.      Case was discussed with Dr. Navarro.      Thanks for the consult.      Total time spent was 60 minutes, more than 50% of the time was spent in counseling and coordination of care.         CLAUDIA MALONE MD             D: 2020   T: 2020   MT: ESTER      Name:     JULIO PRESCOTT   MRN:      [id number]        Account:       [id number]   :      1955           Consult Date:  2020      Document: [id number]

## 2020-04-16 NOTE — PROGRESS NOTES
[facility]    Hospitalist Progress Note    Date of Service (when I saw the patient): 04/16/2020    Assessment & Plan   Parmjit Hernández is a 64 year old male who was admitted on 4/15/2020.  Assessment & Plan     Parmjit Hernández is a 64 year old male history of chronic abdominal pain presents to the emergency department with 10-day history of of profuse watery diarrhea.  Active Problems:    Diarrhea of presumed infectious origin  COVID rule out- low probability  --- Patient complains of 10-day history of profuse watery diarrhea, 12-15 times per day, no blood or mucus noted, he presented due to increased frequency of loose stools.  ---His white count is within normal limits, he does have a chronic abdominal pain, currently mentions worsening of his abdominal pain, he has an intrathecal pump for his abdominal pain which was placed 2 years ago, he is supposed to get  a refill, part of his diarrhea and abdominal pain worsening could be secondary to that, he missed his appointment which was supposed to be on the day of admission.  [facility] consulted and recommended conservative management for now   Diarrhea improved since admission and no bowel movement since admission so he was not able to give sample for infectious work-up  Part of the good diarrhea could be due to opioid withdrawal as patient was not doing the boluses on the pending pain pump  Will request home care RN from pain clinic to come and refill his pain pump as per the pain medicine team in hospital  ---Chronic abdominal pain  Narcotic dependence  --- Patient has chronic abdominal pain and has an intrathecal pump, he might have to go to pain clinic soon after discharge to get that refilled to avoid withdrawal.  He is not sure about the dosage that he is receiving, will cover for his pain here with IV narcotics.  --- We will request social work input to set up outpatient follow-up with pain clinic.    Questionable splenic infarct versus hemangioma  on CT scan;  This lesion was also seen on multiple CT scans in 2018 and was not seen in the most recent CT so most likely hemangioma  Heme-onc consultation requested by admitting physician  Their evaluation is currently pending    Other cirrhosis of liver (H) possibly from vences   --- New information for the patient, discussed briefly about Vences  Stable monitor closely      Hyperlipidemia LDL goal <100  --- Statin on hold due to of status    Hypertension goal BP (blood pressure) < 140/90  ---Due to his ongoing diarrhea and dehydration we will hold off on lisinopril and hydrochlorothiazide  --- We will place PRN orders for high blood pressure, we might have to monitor for alcohol withdrawal, will also order for cows scale.    Bipolar I disorder (H)  --- Depakote, will request for Depakote levels.    Other amyloidosis (H)   --- He is followed up with hematology Dr. Lainez status post peripheral stem cell transplant    Type 2 diabetes mellitus without complication, without long-term current use of insulin (H)  --- Continue PTA glipizide, diabetic diet, sliding scale coverage, hold metformin for now restart prior to discharge.    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        DVT Prophylaxis: Pneumatic Compression Devices  Code Status: Full Code     Disposition: Expected discharge in 24 hours         Paula Navarro MD  [phone] (P)      Interval History   Patient is resting comfortably in bed.  Complains of abdominal pain more in the upper abdomen.  Diarrhea improved no bowel movement since admission.  His pain pump to run out of medication soon so we requested pain medicine consult to contact the pain clinic and they are going to send her home care RN with a syringe to refill patient's pain pump in the hospital      -Data reviewed today: I reviewed all new labs and imaging results over the last 24 hours. I personally reviewed no images or EKG's today.    Physical Exam   Temp: 98.3  F (36.8  C) Temp src: Oral BP: (!) 142/94 Pulse: 79   Resp: 18 SpO2: 97 % O2 Device: None (Room air)    There were no vitals filed for this visit.  Vital Signs with Ranges  Temp:  [96.8  F (36  C)-98.3  F (36.8  C)] 98.3  F (36.8  C)  Pulse:  [77-95] 79  Resp:  [18] 18  BP: (120-177)/() 142/94  SpO2:  [92 %-97 %] 97 %  No intake/output data recorded.    Constitutional: Awake, alert, cooperative, no apparent distress  Respiratory: Clear to auscultation bilaterally, no crackles or wheezing  Cardiovascular: Regular rate and rhythm, normal S1 and S2, and no murmur noted  GI: Normal bowel sounds, soft, non-distended, non-tender  Skin/Integumen: No rashes, no cyanosis, no edema  Other:     Medications     lactated ringers 75 mL/hr at 04/16/20 1116     sodium chloride Stopped (04/15/20 2349)       sodium chloride 0.9%  1,000 mL Intravenous Once     aspirin  81 mg Oral Daily     divalproex sodium delayed-release  1,000 mg Oral Daily     doxepin  50 mg Oral At Bedtime     glipiZIDE  2.5 mg Oral Daily     insulin aspart  1-7 Units Subcutaneous TID AC     insulin aspart  1-5 Units Subcutaneous At Bedtime     pregabalin  100 mg Oral 4x Daily     sodium chloride (PF)  3 mL Intracatheter Q8H     tamsulosin  0.8 mg Oral Daily       Data   Recent Labs   Lab 04/16/20  0730 04/15/20  2024   WBC 5.4 6.4   HGB 12.8* 14.5   MCV 95 94   * 170    131*   POTASSIUM 3.8 3.6   CHLORIDE 105 100   CO2 24 21   BUN 15 16   CR 1.20 1.18   ANIONGAP 7 10   LUIS 8.2* 8.6   * 131*   ALBUMIN 3.0*  --    PROTTOTAL 5.6*  --    BILITOTAL 1.0  --    ALKPHOS 81  --    ALT 55  --    AST 19  --        Recent Results (from the past 24 hour(s))   CT Abdomen Pelvis w Contrast    Narrative    CT ABDOMEN PELVIS WITH CONTRAST 4/15/2020 9:38 PM    CLINICAL HISTORY:  Nausea, vomiting.     TECHNIQUE: CT scan of the abdomen and pelvis was performed following  injection of IV contrast. Multiplanar reformats were obtained. Dose  reduction techniques were used.  CONTRAST: 134 mL Isovue-[id number]    COMPARISON: CT chest abdomen pelvis 1/26/2018.    FINDINGS:   LOWER CHEST: Mild scarring and architectural distortion is noted at  the lung bases which are otherwise clear.    HEPATOBILIARY: Cholecystectomy changes. Slight nodularity to the liver  contour suggests chronic liver disease or early changes of cirrhosis.    PANCREAS: Normal.    SPLEEN: Hypodensity in the lateral aspect of the spleen on series 4,  image 67 was not appreciated on prior CT. This could be an hemangioma  or area of splenic infarct. On coronal imaging this is more linear in  appearance possibly sequela from previous infarct. Spleen is enlarged  measuring nearly 16 cm in AP dimension concerning for portal venous  hypertension.    ADRENAL GLANDS: Normal.    KIDNEYS/BLADDER: A few subcentimeter renal cortical cysts are present.  These are too small to characterize by CT but appears stable compared  to prior exam. No hydronephrosis or urinary tract calculi. Bladder is  unremarkable.    BOWEL: No bowel obstruction, diverticulitis or appendicitis. Stomach  is nondistended. Duodenum is unremarkable in appearance.    LYMPH NODES: Multiple small jaguar hepatis and retroperitoneal lymph  nodes are present, minimally changed since prior exam. These may be  reactive in light of suspected underlying chronic liver disease or  cirrhosis.    VASCULATURE: Hepatic vasculature is patent as is the splenic vein and  SMV.    PELVIC ORGANS: Multiple small bilateral pelvic lymph nodes are present  and may be reactive. No free pelvic fluid. Prostate gland and rectum  are unremarkable. Bilateral fat-containing inguinal hernias are  present.    ADDITIONAL FINDINGS: No evidence of ascites, free intraperitoneal air  or peritoneal  nodularity.    MUSCULOSKELETAL: Mild degenerative spine changes. No destructive bone  lesions. Pump reservoir is noted in the right flank with spinal canal  catheter extending off the superior aspect of the imaged area.      Impression    IMPRESSION:   1.  Findings concerning for underlying liver cirrhosis and portal  venous hypertension with splenomegaly. Multiple small reactive jaguar  hepatis, retroperitoneal and pelvic lymph nodes are suspected. None of  these are significantly enlarged by CT criteria and are minimally  changed since prior study.  2.  Indeterminate hypodensity in the spleen possibly splenic infarct  or hemangioma. This is new in the interval since prior CT.  3.  No evidence of bowel obstruction, diverticulitis or appendicitis.  Stomach and duodenum are within normal limits.    RICKY CAGE MD

## 2020-04-16 NOTE — PROVIDER NOTIFICATION
DATE:  4/16/2020   TIME OF RECEIPT FROM LAB:  0244  LAB TEST:  COVID 19  LAB VALUE:  Negative  RESULTS GIVEN WITH READ-BACK TO (PROVIDER):  Dr. Flores  TIME LAB VALUE REPORTED TO PROVIDER:   0245    Spoke with reyna Knight to discontinue precautions and transfer to med surg bed

## 2020-04-16 NOTE — PLAN OF CARE
A&Ox4, VSS on RA. C/o 9/10 pain managed with PRN dilaudid. Home nurse to come at 1400 to re-fill intrathecal pain pump. Enteric precautions, needs stool sample. No BM this shift. Up ad paresh. Diabetic. Mod carb diet, voiding adequately.  IV infusing. Plan for GI, Hematology/Oncology, Pain management, Vascular, Care transition consult.

## 2020-04-16 NOTE — PLAN OF CARE
DATE & TIME: 7953-0124; 4/16/2020   Cognitive Concerns/ Orientation : A & O x 4; calm and cooperative    BEHAVIOR & AGGRESSION TOOL COLOR: Green   CIWA SCORE: N/A   ABNL VS/O2: VSS on room air   MOBILITY: independent   PAIN MANAGMENT: reports abdominal pain 8-9/10; received dilaudid x 1; heat applied to abdomin   DIET: CHO; tolerating well   BOWEL/BLADDER: BS active x 4; no BM since admission   ABNL LAB/BG: blood Sugars= 127; 157; 136; 117   DRAIN/DEVICES: PIV/SL   TELEMETRY RHYTHM: N/A  SKIN: Dry, intact; has internal pain pump in lower right back   TESTS/PROCEDURES: N/A  D/C DAY/GOALS/PLACE: pending discharge   OTHER IMPORTANT INFO: Vitals q4h. Need stool sample. Enteric precautions maintained.

## 2020-04-16 NOTE — ED PROVIDER NOTES
History     Chief Complaint:  Diarrhea      HPI   Parmjit Hernández is a 64 year old male with a history of DM type 2, amyloidosis, HTN, hyperlipidemia, and diverticulitis who presents to the emergency department via EMS for evaluation of diarrhea. Patient states he has had diarrhea for the past 10 days and has since weight. He states he has never had diarrhea like this before. He also has some vague abdominal pain that he describes as achy, comes and goes, and nonfocal, and is asking for relief, primarily from his pain. Patient states he stopped taking all of his medications, including his blood pressure and diabetes medications, because they make him feel sick. He denies fever and vomiting.    Allergies:  Cephalexin  Liraglutide  Sulfa Drugs  Victoza    Medications:    Aspirin 81 mg  Atorvastatin  Depakote  Doxepin  Glipizide  Hydrochlorothiazide  Lisinopril  Lurasidone  Metformin  Modafinil  Lyrica  Sennosides  Skyrizi  Flomax     Past Medical History:    Amyloidosis  DM type 2  HTN  Migraines  OCD  Hyperlipidemia  NORMA  Diverticulitis  Bipolar 1 disorder  Anxiety  Insomnia  Narcotic dependence  RLS  Morbid obesity  ADD  GERD  Depression    Past Surgical History:    BMT protocol  Bone marrow biopsy x2  Cholecystectomy  Repair deviated septum  Umbilical hernia repair     Family History:    Cerebrovascular disease - mother  CAD - mother, father  HTN - mother, father  Alcohol/Drug - mother, father  Arthritis - mother  Depression - father, sister  Asthma - father  Diabetes - brother    Social History:  Smoking status: never smoker  Alcohol use: no  Drug use: no  The patient presents to the emergency department via EMS.  PCP: Vince Henry  Marital Status:       Review of Systems   Constitutional: Positive for unexpected weight change. Negative for fever.   Gastrointestinal: Positive for abdominal pain and diarrhea. Negative for vomiting.   All other systems reviewed and are negative.      Physical Exam      Patient Vitals for the past 24 hrs:   BP Pulse Resp SpO2   04/15/20 2003 (!) 177/109 92 18 95 %       Physical Exam  Vitals: reviewed by me  General: Pt seen on hospital [facility], pleasant, cooperative, and alert to conversation, does appear to be in pain however.  Eyes: Tracking well, clear conjunctiva BL  ENT: MMM, midline trachea.   Lungs:  No tachypnea, no accessory muscle use. No respiratory distress.   CV: Rate as above, regular rhythm.    Abd: Soft, non tender, no guarding, no rebound. Non distended  MSK: no peripheral edema or joint effusion.  No evidence of trauma  Skin: No rash, normal turgor and temperature  Neuro: Clear speech and no facial droop.  Psych: Not RIS, no e/o AH/VH      Emergency Department Course   Imaging:  Radiographic findings were communicated with the patient who voiced understanding of the findings.  CT Abdomen pelvis with contrast:   IMPRESSION:   1.  Findings concerning for underlying liver cirrhosis and portal   venous hypertension with splenomegaly. Multiple small reactive jaguar   hepatis, retroperitoneal and pelvic lymph nodes are suspected. None of   these are significantly enlarged by CT criteria and are minimally   changed since prior study.   2.  Indeterminate hypodensity in the spleen possibly splenic infarct   or hemangioma. This is new in the interval since prior CT.   3.  No evidence of bowel obstruction, diverticulitis or appendicitis.   Stomach and duodenum are within normal limits. as per radiology.    Laboratory:  CBC: WBC: 6.4, HGB: 14.5, PLT: 170  BMP: Glucose 131 (H), Sodium 131 (L), o/w WNL (Creatinine: 1.18)  2012 Glucose by meter: 121  UA: Negative  COVID-19 swab: Pending    Interventions:  2028 NS 1000 mL IV  2032 morphine 6 mg IV  2033 Zofran 4 mg IV  2139 morphine 4 mg IV    Emergency Department Course:  Nursing notes and vitals reviewed. (2011) I performed an exam of the patient as documented above.     IV inserted. Medicine administered as documented  above. Blood drawn. Urine sample obtained. COVID-19 swab obtained. This was sent to the lab for further testing, results above.     The patient was sent for a CT while in the emergency department, findings above.     2145 I rechecked the patient and discussed the results of his workup thus far.     2155  I consulted with Dr. Araya of the hospitalist services. She is in agreement to accept the patient for admission.    Findings and plan explained to the Patient who consents to admission. Discussed the patient with Dr. Araya, who will admit the patient to a COVID rule out bed for further monitoring, evaluation, and treatment.      Impression & Plan    Medical Decision Making:  Parmjit Hernández is a 64 year old male who presents to the emergency room with 10 days of diarrhea of unclear cause. Not typical for C diff, may certainly be coronavirus. He states he does not feel well enough to go home, he feels to weak, and is asking to stay for antiemetics, pain control, and IV fluids. I do think that this is reasonable. Will plan for admission to the care of Dr. Araya who has kindly agreed to accept care of the patient to an observation bed. Patient is ok with this plan. CT scan shows some liver disease, but nothing acute. No evidence of colitis. Will defer antibiotics to inpatient team. Does not appear to be bacterial at this time.    Diagnosis:    ICD-10-CM    1. Diarrhea, unspecified type  R19.7    2. Abdominal pain, generalized  R10.84        Disposition:  Admitted to the hospital  Deion Tristan  4/15/2020    EMERGENCY DEPARTMENT  Scribe Disclosure:  I, Deion Tristan, am serving as a scribe at 8:11 PM on 4/15/2020 to document services personally performed by Spike Mann MD based on my observations and the provider's statements to me.        Spike Mann MD  04/15/20 7415

## 2020-04-16 NOTE — PLAN OF CARE
RECEIVING UNIT ED HANDOFF REVIEW    ED Nurse Handoff Report was reviewed by: Ye Rincon RN on April 15, 2020 at 11:08 PM

## 2020-04-16 NOTE — CONSULTS
Consult Date:  04/16/2020      GASTROENTEROLOGY CONSULTATION      REASON FOR CONSULTATION:  Acute diarrhea.      REQUESTING PHYSICIAN:  Dr. Flores      HISTORY OF PRESENT ILLNESS:  Mr. Hernández is a 64-year-old male with history of sarcoidosis, diabetes, and chronic abdominal pain, whom I saw in consultation for acute diarrhea.  He does have chronic abdominal pain for which he has had extensive workup, both with my clinic and with the [place].  No cough has been found.  For his pain, he has been maintained on a narcotic pump.  The plan was for him to get the pump refilled yesterday, but he was admitted instead.  He has been getting his baseline dose of pain medication, but has not been giving himself boluses.      For the past 10 days, he has had diarrhea.  He has had up to 15 bowel movements today.  He tells me he has not had any in the past 2 days.  The bowel movements are watery and nonbloody.  He continues to have abdominal pain.  It feels like the same pain that he has always had.  It just has been worse recently.      He denies any ill contacts, recent camping or travel, no suspicious foods in his diet, and no recent antibiotics.  He has not had any fevers or chills.  He does have a headache today and a runny nose.      Upon presentation to [facility], he had a normal creatinine of 1.18, normal liver tests.  White count is 5.4 this morning, hemoglobin 12.8 down from 14.5 yesterday, platelet count 143.  He tested negative for COVID.  He had a CT scan of the abdomen that showed findings concerning for cirrhosis and portal venous hypertension with splenomegaly.  He had multiple small reactive jaguar hepatitis lymph nodes, which are unchanged from prior study.  He has a splenic infarct.      PAST MEDICAL HISTORY:   1.  Narcotic dependence.   2.  Chronic abdominal pain.   3.  New diagnosis of cirrhosis.   4.  Dyslipidemia.   5.  Bipolar disorder.   6.  Hypertension.   7.  Obesity.   8.  Amyloidosis.   9.   Obsessive-compulsive disorder.   10.  Sleep apnea.   11.  History of laparoscopic cholecystectomy.   12.  Umbilical hernia repair.   13.  History of colon polyps.  Last colonoscopy was in 08/2018.      SOCIAL HISTORY:  The patient does not use tobacco, alcohol or illicit drugs.      FAMILY HISTORY:  Mother had coronary artery disease, stroke, hypertension and arthritis.  There is no known family history of GI or liver disease.      REVIEW OF SYSTEMS:  A 10-point review of systems was done and other than those mentioned in HPI, it was positive for urinary hesitancy.      PHYSICAL EXAMINATION:   VITAL SIGNS:  Temperature 98.1, BP is 120/73, pulse 77, respiratory rate 18, pulse ox 94% on room air.   GENERAL:  The patient is awake, alert and oriented, no acute distress.   HEENT:  Normocephalic, atraumatic.  Pupils equal, round, reactive to light.  Extraocular eye muscles intact.  Sclerae anicteric.  Mucous membranes are moist.   NECK:  Supple.   LUNGS:  Clear to auscultation bilaterally.   HEART:  Regular rate and rhythm.   ABDOMEN:  Soft, obese, mildly diffusely tender with normal bowel sounds.   EXTREMITIES:  Warm.  He has bilateral lower extremity venous stasis dermatitis.  Minimal nonpitting edema.  No clubbing or cyanosis.   NEUROLOGIC:  Cranial nerves II-XII grossly intact.   PSYCHIATRIC:  No obvious anxiety or depression.      ASSESSMENT:   1.  Acute diarrhea:  It appears to be improving on its own without intervention since he has not had a bowel movement for 2 days.  Most likely, this would be infectious in nature.  He has not been able to give us a stool sample to find out.  If he has been decreasing the amount of narcotics use, this could be related to narcotic withdrawal.  Less likely causes would include microscopic colitis, amyloidosis, inflammatory bowel disease.   2.  He also has a new diagnosis of cirrhosis with some evidence of portal hypertension with splenomegaly.  This is reflected in his slightly  low platelet count.  He appears to be compensated.  He is at risk for MEJIAS (non-alcoholic steatohepatitis) given his dyslipidemia, hypertension, diabetes and obesity.  Has not had significant alcohol intake.      PLAN:   1.  Await results of stool studies.  No plans for endoscopic procedure at this point.   2.  We will perform usual biochemical and serological workup for chronic liver disease.  He will need outpatient Hepatology followup.      Thank you for allowing me to participate in the care of this patient.  Please contact me with any questions or concerns.         ARSH GILBERT MD             D: 2020   T: 2020   MT: DONALD      Name:     JULIO PRESCOTT   MRN:      [id number]        Account:       [id number]   :      1955           Consult Date:  2020      Document: [id number]       cc: Vince Henry MD

## 2020-04-16 NOTE — ED TRIAGE NOTES
Pt reports having diarrhea for 1o days ans states he has lost a lot of weight. Pt reports he quit taking all his medications including BP and diabetic meds because they make him feel sick.

## 2020-04-16 NOTE — PLAN OF CARE
A&O. VSS on RA. Pain not well controlled. 1mg IV dilaudid given every 2 hrs. Up ind. IVF infusing. C/o nausea, declines intervention. COVID r/o. CDIF pending, needs stool sample. Tx to [address] with all belongings.

## 2020-04-16 NOTE — ED NOTES
Bed: ED10  Expected date:   Expected time:   Means of arrival:   Comments:  736 63m diarrhea, negative COVID, eta 10 min

## 2020-04-16 NOTE — PROVIDER NOTIFICATION
md notified, uncontrolled pain.      Orders: increased dilaudid to 0.5-1mg. Pain consult ordered.

## 2020-04-16 NOTE — PROVIDER NOTIFICATION
Brief update:    Increased dilaudid to 0.5-1mg range q2h    Pain consult for pump eval; missed appointment yesterday.    Jose Flores MD  1:31 AM

## 2020-04-16 NOTE — PROGRESS NOTES
DATE & TIME: 04/16/20 [id number]    Cognitive Concerns/ Orientation : alert/oriented x 4   BEHAVIOR & AGGRESSION TOOL COLOR: green  CIWA SCORE: n/a   ABNL VS/O2:   MOBILITY: Independent in room  PAIN MANAGMENT: chronic pain, dilaudid available/requested every 2 hours, IV Zofran given x 1 for nausea up arrival at unit  DIET: Moderate carbohydrate with carbohydrate count  BOWEL/BLADDER: Up to bathroom, no BM this shift  ABNL LAB/BG: BG 95  DRAIN/DEVICES: R PIV LR at 75 mL/hour  TELEMETRY RHYTHM: N/A  SKIN: intact  TESTS/PROCEDURES: COVID result negative  D/C DAY/GOALS/PLACE: pending  OTHER IMPORTANT INFO: Stool sample to r/o C. Diff. Enteric precautions maintained. Pt transferred from obs

## 2020-04-16 NOTE — PHARMACY-ADMISSION MEDICATION HISTORY
Pharmacy Medication History  Admission medication history interview status for the 4/15/2020  admission is complete. See EPIC admission navigator for prior to admission medications     Medication history sources: Patient  Medication history source reliability: Phone interview conducted by phone.  Difficult to  reliability   Adherence assessment: Poor Has take no medications x 10 days     Intrathecal pain pump in place.   Drug contents are available on card in patient's possession.        Medication reconciliation completed by provider prior to medication history? Yes    Time spent in this activity: 15 minutes      Prior to Admission medications    Medication Sig Last Dose Taking? Auth Provider   aspirin (ASPIRIN LOW DOSE) 81 MG tablet Take 1 tablet (81 mg) by mouth daily Past Month at Unknown time Yes Vince Henry MD   atorvastatin (LIPITOR) 10 MG tablet TAKE 1 TABLET(10 MG) BY MOUTH DAILY Past Month at Unknown time Yes Vince Henry MD   divalproex (DEPAKOTE) 500 MG EC tablet Take 2 tablets (1,000 mg) by mouth daily Past Month at Unknown time Yes Spike Tobin MD   glipiZIDE (GLUCOTROL XL) 2.5 MG 24 hr tablet Take 1 tablet (2.5 mg) by mouth daily Past Month at Unknown time Yes Vince Henry MD   hydrochlorothiazide (HYDRODIURIL) 12.5 MG tablet Take 2 tablets (25 mg) by mouth daily Past Month at Unknown time Yes Vince Henry MD   lisinopril (PRINIVIL/ZESTRIL) 20 MG tablet Take 1 tablet (20 mg) by mouth daily Past Month at Unknown time Yes Vince Henry MD   lurasidone (LATUDA) 40 MG TABS tablet Take 40 mg by mouth At Bedtime Past Month at Unknown time Yes Unknown, Entered By History   metFORMIN (GLUCOPHAGE-XR) 500 MG 24 hr tablet Take 2 tablets (1,000 mg) by mouth 2 times daily (with meals) Past Month at Unknown time Yes Vnice Henry MD   modafinil (PROVIGIL) 200 MG tablet Take 200 mg by mouth every morning  Past Month at Unknown time Yes Reported, Patient    Multiple Vitamins-Minerals (SENIOR MULTIVITAMIN PLUS PO) Take 1 tablet by mouth daily Past Month at Unknown time Yes Reported, Patient   pregabalin (LYRICA) 100 MG capsule Take 1 capsule (100 mg) by mouth 4 times daily Past Month at Unknown time Yes Vince Henry MD   sennosides (SENOKOT) 8.6 MG tablet Take one tablet in the morning and 2 tablets in the evening. Past Month at Unknown time Yes    tamsulosin (FLOMAX) 0.4 MG capsule Take 0.8 mg by mouth daily  Past Month at Unknown time Yes Reported, Patient   vitamin C (ASCORBIC ACID) 1000 MG TABS Take 1,000 mg by mouth daily Past Month at Unknown time Yes Reported, Patient   blood glucose (ACCU-CHEK COMPACT DRUM) test strip Use to test blood sugars 3 to 4 times daily.   Vince Henry MD   blood glucose (ACCU-CHEK MULTICLIX) lancing device Lancing device to be used with lancets.   Vince Henry MD   blood glucose monitoring (SOFTCLIX) lancets USE TO TEST BLOOD SUGAR 3-4 TIMES DAILY OR AS DIRECTED.   Reported, Patient   NONFORMULARY by Intrathecal route continuous - + up to 3 boluses daily    Implanted pump infusing Fentanyl, Morphine and Bupivacaine    Managed by Dr Pawan Shaw [facility]   Unknown, Entered By History

## 2020-04-16 NOTE — H&P
[facility]    History and Physical  Hospitalist       Date of Admission:  4/15/2020    Assessment & Plan   Parmjit Hernández is a 64 year old male history of chronic abdominal pain presents to the emergency department with 10-day history of of profuse watery diarrhea.  Active Problems:    Diarrhea of presumed infectious origin  COVID rule out- low probability  --- Patient complains of 10-day history of profuse watery diarrhea, 12-15 times per day, no blood or mucus noted, he presented due to increased frequency of loose stools.  ---His white count is within normal limits, he does have a chronic abdominal pain, currently mentions worsening of his abdominal pain, he has an intrathecal pump for his abdominal pain which was placed 2 years ago, he is supposed to get  a refill, part of his diarrhea and abdominal pain worsening could be secondary to that, he missed his appointment which was supposed to be on the day of admission.  ---Due to ongoing pandemic of COVID will rule it out as well as obtain C. difficile test and stool enteric panel due to the prolonged duration of his diarrhea and associated abdominal pain.  Patient does not have any ascites as per his CT, but CT of his liver did indicate possible early cirrhosis of the liver which is a new information for the patient, he knew he had fatty liver but not cirrhosis of the liver.  ---With his multiple GI issues we will request a GI input since patient may not be able to make an appointment to meet with him soon.  Chronic abdominal pain  Narcotic dependence  --- Patient has chronic abdominal pain and has an intrathecal pump, he might have to go to pain clinic soon after discharge to get that refilled to avoid withdrawal.  He is not sure about the dosage that he is receiving, will cover for his pain here with IV narcotics.  --- We will request social work input to set up outpatient follow-up with pain clinic.    Other cirrhosis of liver (H) possibly from  win   --- New information for the patient, discussed briefly about Win, will request GI input.    Hyperlipidemia LDL goal <100  --- Statin on hold due to of status    Hypertension goal BP (blood pressure) < 140/90  ---Due to his ongoing diarrhea and dehydration we will hold off on lisinopril and hydrochlorothiazide  --- We will place PRN orders for high blood pressure, we might have to monitor for alcohol withdrawal, will also order for cows scale.    Bipolar I disorder (H)  --- Depakote, will request for Depakote levels.    Other amyloidosis (H)   --- He is followed up with hematology Dr. Lainez status post peripheral stem cell transplant    Type 2 diabetes mellitus without complication, without long-term current use of insulin (H)  --- Continue PTA glipizide, diabetic diet, sliding scale coverage, hold metformin for now restart prior to discharge.    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      DVT Prophylaxis: Pneumatic Compression Devices  Code Status: Full Code    Disposition: Expected discharge in 24 hours    Sonali Araya MD    Primary Care Physician   Vince Henry    Chief Complaint   Diarrhea 10 days   Abdominal pain weeks     History is obtained from the patient    History of Present Illness   Parmjit Hernández is a 64 year old male multiple medical problems including type 2 diabetes, amyloidosis status post peripheral stem cell transplant, sleep apnea, Win, bipolar 1 disorder, OCD, chronic abdominal pain on pain pump presents to the emergency department with complaints of 10-day history of watery diarrhea, he denies any sick contacts, denies any fever, chills, nausea, vomiting, melena.  His stool was mostly watery and had no mucus or blood in it.  Patient denied any prior symptoms similar to this in  the past, he stayed at home and try to hydrate but his diarrhea was worsening and he was feeling generalized weakness so he presented to the emergency department.  Of note he has an intrathecal pump for chronic abdominal pain which was placed almost 2 years ago and he follows up with pain clinic he is due for refill and he missed a today's appointment, there is a concern whether he is running out of medication in his pump.  He is requesting for narcotics while here, patient is quite intolerant to narcotics he might need high doses while here, not sure the cause of his abdominal pain, so far no diagnosis is made regarding his abdominal pain.    He is hyponatremic with a sodium of 131, creatinine 1.18, slightly increased than his baseline, glucose 131, white count is normal 6.4 but he has associated lymphopenia, his platelet count is 170  UA appears within normal limits, patient had a CT abdomen and pelvis, report as below.  Past Medical History    I have reviewed this patient's medical history and updated it with pertinent information if needed.   Past Medical History:   Diagnosis Date     Allergic state      Amyloidosis (H)      Diabetes mellitus (H)     type 2     Headache(784.0)      Hypertension 2007     Myalgia and myositis, unspecified      Obsessive-compulsive disorders      Other acquired absence of organ 94     Other specified viral warts      Pain in the abdomen      Pure hypercholesterolemia      Sleep apnea        Past Surgical History      I have reviewed this patient's surgical history and updated it with pertinent information if needed.  Past Surgical History:   Procedure Laterality Date     BMT PROTOCOL      for systemic amyloidosis     BONE MARROW BIOPSY, BONE SPECIMEN, NEEDLE/TROCAR N/A 6/8/2016    Procedure: BIOPSY BONE MARROW;  Surgeon: Nathan Agrawal MD;  Location:  GI     BONE MARROW BIOPSY, BONE SPECIMEN, NEEDLE/TROCAR N/A 2/20/2019    Procedure: BIOPSY BONE MARROW;  Surgeon:  Michael Raygoza MD;  Location:  GI     C NONSPECIFIC PROCEDURE  94    Cholecystectomy     C NONSPECIFIC PROCEDURE  2000    repair deviated septum     CHOLECYSTECTOMY  1995    lap qian     COLONOSCOPY  2012    hx polyps     ESOPHAGOSCOPY, GASTROSCOPY, DUODENOSCOPY (EGD), COMBINED N/A 5/29/2015    Procedure: COMBINED ESOPHAGOSCOPY, GASTROSCOPY, DUODENOSCOPY (EGD), BIOPSY SINGLE OR MULTIPLE;  Surgeon: John Jacob MD;  Location:  GI     HERNIA REPAIR, UMBILICAL  2006       Prior to Admission Medications   Prior to Admission Medications   Prescriptions Last Dose Informant Patient Reported? Taking?   Multiple Vitamins-Minerals (SENIOR MULTIVITAMIN PLUS PO)   Yes No   Sig: Take 1 tablet by mouth daily   NONFORMULARY  Self Yes No   Sig: by Intrathecal route continuous - + up to 3 boluses daily    Implanted pump infusing Fentanyl, Morphine and Bupivacaine    Managed by Dr Pawan Shaw, [facility]   SKYRIZI, 150 MG DOSE, 75 MG/0.83ML subcutaneous   Yes No   aspirin (ASPIRIN LOW DOSE) 81 MG tablet   No No   Sig: Take 1 tablet (81 mg) by mouth daily   atorvastatin (LIPITOR) 10 MG tablet   No No   Sig: TAKE 1 TABLET(10 MG) BY MOUTH DAILY   blood glucose (ACCU-CHEK COMPACT DRUM) test strip   No No   Sig: Use to test blood sugars 3 to 4 times daily.   blood glucose (ACCU-CHEK MULTICLIX) lancing device  Self No No   Sig: Lancing device to be used with lancets.   blood glucose monitoring (ACCU-CHEK COMPACT CARE KIT) meter device kit  Self No No   Sig: Use to test blood sugars 3 to 4  times daily.   blood glucose monitoring (SOFTCLIX) lancets   Yes No   Sig: USE TO TEST BLOOD SUGAR 3-4 TIMES DAILY OR AS DIRECTED.   divalproex (DEPAKOTE) 500 MG EC tablet  Self No No   Sig: Take 2 tablets (1,000 mg) by mouth daily   doxepin (SINEQUAN) 25 MG capsule  Self Yes No   Sig: Take 50 mg by mouth At Bedtime    glipiZIDE (GLUCOTROL XL) 2.5 MG 24 hr tablet   No No   Sig: Take 1 tablet (2.5 mg) by mouth daily    hydrochlorothiazide (HYDRODIURIL) 12.5 MG tablet   No No   Sig: Take 2 tablets (25 mg) by mouth daily   lisinopril (PRINIVIL/ZESTRIL) 20 MG tablet   No No   Sig: Take 1 tablet (20 mg) by mouth daily   lurasidone (LATUDA) 40 MG TABS tablet  Self Yes No   Sig: Take 40 mg by mouth At Bedtime   metFORMIN (GLUCOPHAGE-XR) 500 MG 24 hr tablet   No No   Sig: Take 2 tablets (1,000 mg) by mouth 2 times daily (with meals)   modafinil (PROVIGIL) 200 MG tablet  Self Yes No   Sig: Take 200 mg by mouth every evening .  Takes prior to starting evening work shift   pregabalin (LYRICA) 100 MG capsule   No No   Sig: Take 1 capsule (100 mg) by mouth 4 times daily   sennosides (SENOKOT) 8.6 MG tablet   Yes No   Sig: Take one tablet in the morning and 2 tablets in the evening.   tamsulosin (FLOMAX) 0.4 MG capsule   Yes No   Si.8 mg    vitamin C (ASCORBIC ACID) 1000 MG TABS   Yes No   Sig: Take 1,000 mg by mouth daily      Facility-Administered Medications: None     Allergies   Allergies   Allergen Reactions     Cephalexin Diarrhea     Liraglutide Other (See Comments)     Sulfa Drugs Swelling     Pt has taken  Taken sulfa drugs orally without trouble. He had problems with Sulfa eye drops. Eye swelled up     Victoza      Increased migraine frequency and severity       Social History   I have reviewed this patient's social history and updated it with pertinent information if needed. Parmjit VILLEDA Betty  reports that he has never smoked. He has never used smokeless tobacco. He reports that he does not drink alcohol or use drugs.    Family History   I have reviewed this patient's family history and updated it with pertinent information if needed.   Family History   Problem Relation Age of Onset     Cerebrovascular Disease Mother      C.A.D. Mother      Hypertension Mother      Alcohol/Drug Mother      Arthritis Mother      Cerebrovascular Disease Maternal Grandmother      C.A.D. Maternal Grandmother      Alcohol/Drug Maternal Grandmother       C.A.D. Father      Heart Disease Father      Hypertension Father      Alcohol/Drug Father      Allergies Father      Circulatory Father      Depression Father      Respiratory Father      Asthma Father      Alcohol/Drug Paternal Grandfather      Cerebrovascular Disease Paternal Grandfather      Depression Son      Psychotic Disorder Son         anxiety disorder     Depression Daughter      Cerebrovascular Disease Maternal Grandfather      Cerebrovascular Disease Paternal Grandmother      Diabetes Brother      Allergies Sister      Depression Sister      Gynecology Sister        Review of Systems   The 10 point Review of Systems is negative other than noted in the HPI or here.     Physical Exam       BP: (!) 177/109 Pulse: 92   Resp: 18 SpO2: 95 % O2 Device: None (Room air)    Vital Signs with Ranges  Pulse:  [92] 92  Resp:  [18] 18  BP: (177)/(109) 177/109  SpO2:  [95 %] 95 %  0 lbs 0 oz    Constitutional: Awake, alert, cooperative, no apparent distress.obese   Eyes: Conjunctiva and pupils examined and normal.  HEENT: Moist mucous membranes, normal dentition.  Respiratory: Clear to auscultation bilaterally, no crackles or wheezing.  Cardiovascular: Regular rate and rhythm, normal S1 and S2, and no murmur noted.  GI: Soft, distended, non-tender, normal bowel sounds.  Lymph/Hematologic: No anterior cervical or supraclavicular adenopathy.  Skin: No rashes, no cyanosis, no edema.  Musculoskeletal: No joint swelling, erythema or tenderness.  Neurologic: Cranial nerves 2-12 intact, normal strength and sensation.  Psychiatric: Alert, oriented to person, place and time, anxious     Data   Data reviewed today:  I personally reviewed CT abdomen noted   Recent Labs   Lab 04/15/20  2024   WBC 6.4   HGB 14.5   MCV 94      *   POTASSIUM 3.6   CHLORIDE 100   CO2 21   BUN 16   CR 1.18   ANIONGAP 10   LUIS 8.6   *       Imaging:  Recent Results (from the past 24 hour(s))   CT Abdomen Pelvis w Contrast     Narrative    CT ABDOMEN PELVIS WITH CONTRAST 4/15/2020 9:38 PM    CLINICAL HISTORY: Nausea, vomiting.     TECHNIQUE: CT scan of the abdomen and pelvis was performed following  injection of IV contrast. Multiplanar reformats were obtained. Dose  reduction techniques were used.  CONTRAST: 134 mL [id number]    COMPARISON: CT chest abdomen pelvis 1/26/2018.    FINDINGS:   LOWER CHEST: Mild scarring and architectural distortion is noted at  the lung bases which are otherwise clear.    HEPATOBILIARY: Cholecystectomy changes. Slight nodularity to the liver  contour suggests chronic liver disease or early changes of cirrhosis.    PANCREAS: Normal.    SPLEEN: Hypodensity in the lateral aspect of the spleen on series 4,  image 67 was not appreciated on prior CT. This could be an hemangioma  or area of splenic infarct. On coronal imaging this is more linear in  appearance possibly sequela from previous infarct. Spleen is enlarged  measuring nearly 16 cm in AP dimension concerning for portal venous  hypertension.    ADRENAL GLANDS: Normal.    KIDNEYS/BLADDER: A few subcentimeter renal cortical cysts are present.  These are too small to characterize by CT but appears stable compared  to prior exam. No hydronephrosis or urinary tract calculi. Bladder is  unremarkable.    BOWEL: No bowel obstruction, diverticulitis or appendicitis. Stomach  is nondistended. Duodenum is unremarkable in appearance.    LYMPH NODES: Multiple small jaguar hepatis and retroperitoneal lymph  nodes are present, minimally changed since prior exam. These may be  reactive in light of suspected underlying chronic liver disease or  cirrhosis.    VASCULATURE: Hepatic vasculature is patent as is the splenic vein and  SMV.    PELVIC ORGANS: Multiple small bilateral pelvic lymph nodes are present  and may be reactive. No free pelvic fluid. Prostate gland and rectum  are unremarkable. Bilateral fat-containing inguinal hernias are  present.    ADDITIONAL FINDINGS: No  evidence of ascites, free intraperitoneal air  or peritoneal nodularity.    MUSCULOSKELETAL: Mild degenerative spine changes. No destructive bone  lesions. Pump reservoir is noted in the right flank with spinal canal  catheter extending off the superior aspect of the imaged area.      Impression    IMPRESSION:   1.  Findings concerning for underlying liver cirrhosis and portal  venous hypertension with splenomegaly. Multiple small reactive jaguar  hepatis, retroperitoneal and pelvic lymph nodes are suspected. None of  these are significantly enlarged by CT criteria and are minimally  changed since prior study.  2.  Indeterminate hypodensity in the spleen possibly splenic infarct  or hemangioma. This is new in the interval since prior CT.  3.  No evidence of bowel obstruction, diverticulitis or appendicitis.  Stomach and duodenum are within normal limits.    RICKY CAGE MD

## 2020-04-16 NOTE — CONSULTS
VASCULAR MEDICINE CONSULT/CHART CHECK    We were consulted regarding a new  incidentally noted splenic infarct. Patient is a 64 year old male with a history of amyloidosis s/p stem cell transplant, diabetes, chronic abdominal pain with pain pump, MEJIAS, bipolar and sleep apnea who presented to the hospital with severe diarrhea. Incidentally, on CT of the abdomen, a hypodensity was noted on the lateral aspect of the spleen. This was not seen on previous imaging in the [facility] system. However, this same lesion was noted on multiple CT scans done at [facility] in 2018. It was monitored and noted to be stable, thought possibly to be a hemangioma. He has been followed very closely as an outpatient by Hematology/Oncology given his past history of stem cell transplant. They were consulted already. Would defer any further management regarding the spleen to them at this time. No formal Vascular Medicine consult to be done at this time. Please feel free to contact us though if we can be of any further assistance this admission or in the future ([phone]).

## 2020-04-17 ENCOUNTER — HOSPITAL ENCOUNTER (OUTPATIENT)
Facility: CLINIC | Age: 65
Setting detail: OBSERVATION
Discharge: HOME | End: 2020-04-18
Attending: EMERGENCY MEDICINE | Admitting: HOSPITALIST
Payer: COMMERCIAL

## 2020-04-17 ENCOUNTER — NURSE TRIAGE (OUTPATIENT)
Dept: NURSING | Facility: CLINIC | Age: 65
End: 2020-04-17

## 2020-04-17 VITALS
TEMPERATURE: 97.2 F | SYSTOLIC BLOOD PRESSURE: 116 MMHG | OXYGEN SATURATION: 93 % | DIASTOLIC BLOOD PRESSURE: 66 MMHG | RESPIRATION RATE: 16 BRPM | HEART RATE: 96 BPM

## 2020-04-17 DIAGNOSIS — I95.9 HYPOTENSION, UNSPECIFIED HYPOTENSION TYPE: ICD-10-CM

## 2020-04-17 DIAGNOSIS — N17.9 ACUTE KIDNEY INJURY (H): ICD-10-CM

## 2020-04-17 DIAGNOSIS — R42 DIZZINESS: ICD-10-CM

## 2020-04-17 DIAGNOSIS — E11.69 TYPE 2 DIABETES MELLITUS WITH OTHER SPECIFIED COMPLICATION, WITHOUT LONG-TERM CURRENT USE OF INSULIN (H): ICD-10-CM

## 2020-04-17 LAB
ALBUMIN SERPL-MCNC: 3 G/DL (ref 3.4–5)
ALP SERPL-CCNC: 72 U/L (ref 40–150)
ALT SERPL W P-5'-P-CCNC: 43 U/L (ref 0–70)
ANION GAP SERPL CALCULATED.3IONS-SCNC: 9 MMOL/L (ref 3–14)
AST SERPL W P-5'-P-CCNC: 14 U/L (ref 0–45)
BASOPHILS # BLD AUTO: 0 10E9/L (ref 0–0.2)
BASOPHILS NFR BLD AUTO: 0.4 %
BILIRUB SERPL-MCNC: 0.6 MG/DL (ref 0.2–1.3)
BUN SERPL-MCNC: 23 MG/DL (ref 7–30)
CALCIUM SERPL-MCNC: 8.4 MG/DL (ref 8.5–10.1)
CHLORIDE SERPL-SCNC: 103 MMOL/L (ref 94–109)
CO2 SERPL-SCNC: 23 MMOL/L (ref 20–32)
CREAT SERPL-MCNC: 2.09 MG/DL (ref 0.66–1.25)
DIFFERENTIAL METHOD BLD: ABNORMAL
EOSINOPHIL # BLD AUTO: 0.2 10E9/L (ref 0–0.7)
EOSINOPHIL NFR BLD AUTO: 1.8 %
ERYTHROCYTE [DISTWIDTH] IN BLOOD BY AUTOMATED COUNT: 14.7 % (ref 10–15)
GFR SERPL CREATININE-BSD FRML MDRD: 33 ML/MIN/{1.73_M2}
GLUCOSE BLDC GLUCOMTR-MCNC: 116 MG/DL (ref 70–99)
GLUCOSE BLDC GLUCOMTR-MCNC: 145 MG/DL (ref 70–99)
GLUCOSE BLDC GLUCOMTR-MCNC: 157 MG/DL (ref 70–99)
GLUCOSE SERPL-MCNC: 101 MG/DL (ref 70–99)
HBV CORE AB SERPL QL IA: NONREACTIVE
HBV SURFACE AB SERPL IA-ACNC: 0 M[IU]/ML
HCT VFR BLD AUTO: 32.8 % (ref 40–53)
HCV AB SERPL QL IA: NONREACTIVE
HGB BLD-MCNC: 11.5 G/DL (ref 13.3–17.7)
IMM GRANULOCYTES # BLD: 0 10E9/L (ref 0–0.4)
IMM GRANULOCYTES NFR BLD: 0.1 %
INR PPP: 1.18 (ref 0.86–1.14)
IRON SATN MFR SERPL: 22 % (ref 15–46)
IRON SERPL-MCNC: 56 UG/DL (ref 35–180)
KETONES BLD-SCNC: 0.1 MMOL/L (ref 0–0.6)
LACTATE BLD-SCNC: 3.1 MMOL/L (ref 0.7–2)
LACTATE BLD-SCNC: 4.2 MMOL/L (ref 0.7–2)
LIPASE SERPL-CCNC: 249 U/L (ref 73–393)
LYMPHOCYTES # BLD AUTO: 1.5 10E9/L (ref 0.8–5.3)
LYMPHOCYTES NFR BLD AUTO: 16.7 %
MCH RBC QN AUTO: 34 PG (ref 26.5–33)
MCHC RBC AUTO-ENTMCNC: 35.1 G/DL (ref 31.5–36.5)
MCV RBC AUTO: 97 FL (ref 78–100)
MONOCYTES # BLD AUTO: 0.4 10E9/L (ref 0–1.3)
MONOCYTES NFR BLD AUTO: 3.8 %
NEUTROPHILS # BLD AUTO: 7.1 10E9/L (ref 1.6–8.3)
NEUTROPHILS NFR BLD AUTO: 77.2 %
NRBC # BLD AUTO: 0 10*3/UL
NRBC BLD AUTO-RTO: 0 /100
PLATELET # BLD AUTO: 164 10E9/L (ref 150–450)
POTASSIUM SERPL-SCNC: 4.1 MMOL/L (ref 3.4–5.3)
PROT SERPL-MCNC: 5.5 G/DL (ref 6.8–8.8)
RBC # BLD AUTO: 3.38 10E12/L (ref 4.4–5.9)
SODIUM SERPL-SCNC: 135 MMOL/L (ref 133–144)
TIBC SERPL-MCNC: 249 UG/DL (ref 240–430)
TROPONIN I SERPL-MCNC: 0.04 UG/L (ref 0–0.04)
VALPROATE FREE SERPL-MCNC: <5 UG/ML (ref 6–20)
WBC # BLD AUTO: 9.2 10E9/L (ref 4–11)

## 2020-04-17 PROCEDURE — 83605 ASSAY OF LACTIC ACID: CPT | Performed by: EMERGENCY MEDICINE

## 2020-04-17 PROCEDURE — 25000132 ZZH RX MED GY IP 250 OP 250 PS 637: Performed by: INTERNAL MEDICINE

## 2020-04-17 PROCEDURE — 99220 ZZC INITIAL OBSERVATION CARE,LEVL III: CPT | Performed by: HOSPITALIST

## 2020-04-17 PROCEDURE — G0378 HOSPITAL OBSERVATION PER HR: HCPCS

## 2020-04-17 PROCEDURE — 36415 COLL VENOUS BLD VENIPUNCTURE: CPT | Performed by: INTERNAL MEDICINE

## 2020-04-17 PROCEDURE — 84484 ASSAY OF TROPONIN QUANT: CPT | Performed by: EMERGENCY MEDICINE

## 2020-04-17 PROCEDURE — 82103 ALPHA-1-ANTITRYPSIN TOTAL: CPT | Performed by: INTERNAL MEDICINE

## 2020-04-17 PROCEDURE — 25800030 ZZH RX IP 258 OP 636: Performed by: EMERGENCY MEDICINE

## 2020-04-17 PROCEDURE — 85610 PROTHROMBIN TIME: CPT | Performed by: EMERGENCY MEDICINE

## 2020-04-17 PROCEDURE — 99217 ZZC OBSERVATION CARE DISCHARGE: CPT | Performed by: INTERNAL MEDICINE

## 2020-04-17 PROCEDURE — 86706 HEP B SURFACE ANTIBODY: CPT | Performed by: INTERNAL MEDICINE

## 2020-04-17 PROCEDURE — 93005 ELECTROCARDIOGRAM TRACING: CPT

## 2020-04-17 PROCEDURE — 85025 COMPLETE CBC W/AUTO DIFF WBC: CPT | Performed by: EMERGENCY MEDICINE

## 2020-04-17 PROCEDURE — 86704 HEP B CORE ANTIBODY TOTAL: CPT | Performed by: INTERNAL MEDICINE

## 2020-04-17 PROCEDURE — 82104 ALPHA-1-ANTITRYPSIN PHENO: CPT | Performed by: INTERNAL MEDICINE

## 2020-04-17 PROCEDURE — 99285 EMERGENCY DEPT VISIT HI MDM: CPT | Mod: 25

## 2020-04-17 PROCEDURE — 96360 HYDRATION IV INFUSION INIT: CPT

## 2020-04-17 PROCEDURE — 80053 COMPREHEN METABOLIC PANEL: CPT | Performed by: EMERGENCY MEDICINE

## 2020-04-17 PROCEDURE — 82010 KETONE BODYS QUAN: CPT | Performed by: EMERGENCY MEDICINE

## 2020-04-17 PROCEDURE — 83690 ASSAY OF LIPASE: CPT | Performed by: EMERGENCY MEDICINE

## 2020-04-17 PROCEDURE — 83540 ASSAY OF IRON: CPT | Performed by: INTERNAL MEDICINE

## 2020-04-17 PROCEDURE — G0472 HEP C SCREEN HIGH RISK/OTHER: HCPCS | Performed by: INTERNAL MEDICINE

## 2020-04-17 PROCEDURE — 96361 HYDRATE IV INFUSION ADD-ON: CPT

## 2020-04-17 PROCEDURE — 00000146 ZZHCL STATISTIC GLUCOSE BY METER IP

## 2020-04-17 PROCEDURE — 83516 IMMUNOASSAY NONANTIBODY: CPT | Performed by: INTERNAL MEDICINE

## 2020-04-17 PROCEDURE — 83550 IRON BINDING TEST: CPT | Performed by: INTERNAL MEDICINE

## 2020-04-17 RX ORDER — SODIUM CHLORIDE 9 MG/ML
INJECTION, SOLUTION INTRAVENOUS CONTINUOUS
Status: DISCONTINUED | OUTPATIENT
Start: 2020-04-17 | End: 2020-04-18

## 2020-04-17 RX ADMIN — LISINOPRIL 20 MG: 20 TABLET ORAL at 09:10

## 2020-04-17 RX ADMIN — DIVALPROEX SODIUM 1000 MG: 500 TABLET, DELAYED RELEASE ORAL at 09:10

## 2020-04-17 RX ADMIN — TAMSULOSIN HYDROCHLORIDE 0.8 MG: 0.4 CAPSULE ORAL at 09:10

## 2020-04-17 RX ADMIN — SODIUM CHLORIDE 1000 ML: 9 INJECTION, SOLUTION INTRAVENOUS at 21:20

## 2020-04-17 RX ADMIN — ASPIRIN 81 MG: 81 TABLET, DELAYED RELEASE ORAL at 09:10

## 2020-04-17 RX ADMIN — PREGABALIN 100 MG: 100 CAPSULE ORAL at 11:36

## 2020-04-17 RX ADMIN — MODAFINIL 200 MG: 200 TABLET ORAL at 09:10

## 2020-04-17 RX ADMIN — PREGABALIN 100 MG: 100 CAPSULE ORAL at 09:10

## 2020-04-17 RX ADMIN — SODIUM CHLORIDE 1000 ML: 9 INJECTION, SOLUTION INTRAVENOUS at 20:14

## 2020-04-17 RX ADMIN — GLIPIZIDE 2.5 MG: 2.5 TABLET, FILM COATED, EXTENDED RELEASE ORAL at 09:10

## 2020-04-17 ASSESSMENT — ENCOUNTER SYMPTOMS
WEAKNESS: 1
VOMITING: 0
LIGHT-HEADEDNESS: 0
SHORTNESS OF BREATH: 0
NAUSEA: 0
COUGH: 0
DIZZINESS: 1
DIARRHEA: 0

## 2020-04-17 NOTE — PROGRESS NOTES
GASTROENTEROLOGY PROGRESS NOTE    CC:    SUBJECTIVE:  Feels much better. No BM since admit. Pain better.    OBJECTIVE:  General Appearance:  Awake alert NAD  /60 (BP Location: Left arm)   Pulse 66   Temp 98.3  F (36.8  C) (Oral)   Resp 16   SpO2 100%   Temp (24hrs), Av.3  F (36.8  C), Min:97.8  F (36.6  C), Max:98.7  F (37.1  C)    No data found.    Intake/Output Summary (Last 24 hours) at 2020 0825  Last data filed at 2020 0100  Gross per 24 hour   Intake 480 ml   Output --   Net 480 ml       PHYSICAL EXAM    Cor: RRR  Abd: S obese NT ND+bs        Additional Comments:  ROS, FH, SH: See initial GI consult for details.    I have reviewed the patient's new clinical lab results:    Recent Labs   Lab Test 20  0730 04/15/20  2024 01/09/20  1058  18  1556  17  0734 17  1848   WBC 5.4 6.4 5.5   < > 10.5   < > 8.0 11.4*   HGB 12.8* 14.5 12.2*   < > 12.6*   < > 12.8* 11.5*   MCV 95 94 95   < > 96   < > 89 89   * 170 148*   < > 132*   < > 87* 106*   INR  --   --   --   --  1.06  --  1.08 1.15*    < > = values in this interval not displayed.     Recent Labs   Lab Test 04/16/20  0730 04/15/20  2024 01/09/20  1058   POTASSIUM 3.8 3.6 4.1   CHLORIDE 105 100 100   CO2 24 21 28   BUN 15 16 37*   ANIONGAP 7 10 8     Recent Labs   Lab Test 20  0730 04/15/20  2210 20  1058 19  0905 05/10/19  1640  18  1134  18  0216  18  0413  18  0900  16  2201  16  1015  06/12/15  0924   ALBUMIN 3.0*  --  3.5  --  3.6  --    < >  --    < > 2.9*   < > 3.6  --  4.0   < > 3.4   < > 4.2   < > 3.8   BILITOTAL 1.0  --  0.4  --  0.5  --    < >  --    < > 0.9   < > 0.8  --  1.4*   < > 0.7   < > 0.7   < > 1.0   ALT 55  --  36  --  32  --    < >  --    < > 22   < > 30  --  37   < > 20   < > 28   < > 44   AST 19  --  31 26 17  --    < >  --    < > 39   < > 12  --  19   < > 9   < > 8   < > 23   PROTEIN  --  Negative  --   --   --  Negative  --   Negative  --   --    < >  --    < >  --    < >  --    < >  --    < >  --    LIPASE  --   --   --   --   --   --   --   --   --  54*  --  94  --  66*   < > 80   < > 111  --  210   AMYLASE  --   --   --   --   --   --   --   --   --   --   --   --   --   --   --  33  --  38  --  38    < > = values in this interval not displayed.         Active Problems:  1. Acute diarrhea: resolved. Suspect infectious process. No further intervention needed at this time  2. Cirrhosis: likely due to MEJIAS. Work up pending. Will arrange hepatology outpt f/u      Will sign off. Call with questions     Lindsey Skelton MD  [facility]  Pager: [phone]  Office: [phone]

## 2020-04-17 NOTE — PLAN OF CARE
Pt here with liver cirrhosis and splenomegaly. A&OX4. CMS intact ex baseline neuropathy in both feet. VSS on R/A. MCHO diet, thin liquids. Takes pills whole. Up independently. C/o chronic pain, declined pain medication this shift. Has intrathecal pain pump in place. Voiding adequately. Enteric precautions for possible C. Diff, stool sample collection pending - no BM since admission. Pt scoring green on the Aggression Stop Light Tool. Plan to possibly discharge home today.

## 2020-04-17 NOTE — PROGRESS NOTES
CM    I: SW received request to set up a HE w/c ride for patient who agrees with costs involved. SW able to schedule for: 3pm.    P: No further SW interventions.    WILBER Rodríguez   [facility]  [phone]

## 2020-04-17 NOTE — PROGRESS NOTES
Consult dictated.    64 year old male with history of amyloidosis admitted with diarrhea. CT scan reveals liver cirrhosis and splenomegaly. There is a hypodensity in the lateral aspect of the spleen. I reviewed the CT scan done at [facility] in 2018. It had revealed splenomegaly with hypodensity. This is likely hemangioma. No further work-up needed.    Plan:  - Follow-up in 6 months in clinic with labs and abdominal US.

## 2020-04-17 NOTE — PLAN OF CARE
A/Ox4. CMS intact. BS active, +flatus, last BM at admission. Abdomen rounded, firmer in upper quadrants--will restart stool softeners this evening. MCHO diet, good appetite.  Independent. Discharge instructions reviewed. Questions answered. Diabetic nutrition handout given per pt request. Belongings sent with patient. Discharge home via HE.

## 2020-04-17 NOTE — DISCHARGE SUMMARY
[facility]  Discharge Summary        Parmjit Hernández MRN# [id number]   YOB: 1955 Age: 64 year old     Date of Admission:  4/15/2020  Date of Discharge:  4/17/2020  Admitting Physician:  Sonali Araya MD  Discharge Physician: Paula Navarro MD  Discharging Service: Hospitalist     Primary Provider: Vince Henry  Primary Care Physician Phone Number: [phone]         Discharge Diagnoses/Problem Oriented Hospital Course (Providers):    Parmjit Hernández was admitted on 4/15/2020 by Sonali Araya MD and I would refer you to their history and physical.  The following problems were addressed during his hospitalization:  Assessment & Plan     Parmjit Hernández is a 64 year old male who was admitted on 4/15/2020.  Assessment & Plan     Parmjit Hernández is a 64 year old male history of chronic abdominal pain presents to the emergency department with 10-day history of of profuse watery diarrhea.  Active Problems:    Diarrhea  presumed from opioid withdrawal   COVID rule out- low probability  --- Patient complains of 10-day history of profuse watery diarrhea, 12-15 times per day, no blood or mucus noted, he presented due to increased frequency of loose stools.  ---His white count is within normal limits, he does have a chronic abdominal pain, currently mentions worsening of his abdominal pain, he has an intrathecal pump for his abdominal pain which was placed 2 years ago, he is supposed to get  a refill, part of his diarrhea and abdominal pain worsening could be secondary to that, he missed his appointment which was supposed to be on the day of admission.  [facility] consulted and recommended conservative management for now   Diarrhea improved since admission and no bowel movement since admission so he was not able to give sample for infectious work-up  Part of the diarrhea could be due to opioid withdrawal as patient was not doing the boluses on the pending pain pump  Pain pump was refilled  during this hospitalization   ---Chronic abdominal pain  Narcotic dependence  --- Patient has chronic abdominal pain and has an intrathecal pump, he might have to go to pain clinic soon after discharge to get that refilled to avoid withdrawal.  He is not sure about the dosage that he is receiving, will cover for his pain here with IV narcotics.  Pain pump was refilled and he is doing well      Questionable splenic infarct versus hemangioma on CT scan;  This lesion was also seen on multiple CT scans in 2018 and was not seen in the most recent CT so most likely hemangioma  Heme-onc consultation requested by admitting physician they also though it was prsent on CT scans from 2008 most likley from hemangioma       Other cirrhosis of liver (H) possibly from vences   --- New information for the patient, discussed briefly about Vences  Stable monitor closely       Hyperlipidemia LDL goal <100  --- Statin on hold due to of status    Hypertension goal BP (blood pressure) < 140/90  ---Due to his ongoing diarrhea and dehydration we will hold off on lisinopril and hydrochlorothiazide  --- We will place PRN orders for high blood pressure, we might have to monitor for alcohol withdrawal, will also order for cows scale.    Bipolar I disorder (H)  --- Depakote, will request for Depakote levels.    Other amyloidosis (H)   --- He is followed up with hematology Dr. Lainez status post peripheral stem cell transplant    Type 2 diabetes mellitus without complication, without long-term current use of insulin (H)  --- Continue PTA glipizide, diabetic diet, sliding scale coverage, hold metformin for now restart prior to discharge.    Restless legs syndrome (RLS)    Morbid obesity (H)  Continue PTA meds     DVT Prophylaxis: Pneumatic Compression Devices  Code Status: Full Code     Disposition: home today            Paula Navarro MD  [phone] (P)           Code Status:      Full Code        Brief Hospital Stay Summary Sent Home With Patient  in AVS:        Reason for your hospital stay      Diarrhea                 Important Results:      See below         Pending Results:        Unresulted Labs Ordered in the Past 30 Days of this Admission     Date and Time Order Name Status Description    4/17/2020 0107 F Actin EIA with reflex In process     4/17/2020 0107 Alpha 1 Antitrypsin In process             Discharge Instructions and Follow-Up:      Follow-up Appointments     Follow-up and recommended labs and tests       Follow up with primary care provider, Vince Henry as needed               Discharge Disposition:      Discharged to home        Discharge Medications:        Current Discharge Medication List      CONTINUE these medications which have NOT CHANGED    Details   aspirin (ASPIRIN LOW DOSE) 81 MG tablet Take 1 tablet (81 mg) by mouth daily  Qty: 30 tablet, Refills: 3    Associated Diagnoses: Hyperlipidemia LDL goal <100; Hypertension goal BP (blood pressure) < 140/90      atorvastatin (LIPITOR) 10 MG tablet TAKE 1 TABLET(10 MG) BY MOUTH DAILY  Qty: 90 tablet, Refills: 1    Associated Diagnoses: Hyperlipidemia LDL goal <100      divalproex (DEPAKOTE) 500 MG EC tablet Take 2 tablets (1,000 mg) by mouth daily  Qty: 10 tablet, Refills: 0    Associated Diagnoses: Hyperlipidemia LDL goal <100      glipiZIDE (GLUCOTROL XL) 2.5 MG 24 hr tablet Take 1 tablet (2.5 mg) by mouth daily  Qty: 30 tablet, Refills: 0      hydrochlorothiazide (HYDRODIURIL) 12.5 MG tablet Take 2 tablets (25 mg) by mouth daily  Qty: 180 tablet, Refills: 0    Associated Diagnoses: Localized edema; Diabetic peripheral vascular disorder (H)      lisinopril (PRINIVIL/ZESTRIL) 20 MG tablet Take 1 tablet (20 mg) by mouth daily  Qty: 90 tablet, Refills: 3    Associated Diagnoses: Hypertension goal BP (blood pressure) < 140/90; KWABENA (acute kidney injury) (H)      lurasidone (LATUDA) 40 MG TABS tablet Take 40 mg by mouth At Bedtime      metFORMIN (GLUCOPHAGE-XR) 500 MG 24 hr tablet Take 2  tablets (1,000 mg) by mouth 2 times daily (with meals)  Qty: 360 tablet, Refills: 0    Associated Diagnoses: Type 2 diabetes mellitus with other specified complication, without long-term current use of insulin (H)      modafinil (PROVIGIL) 200 MG tablet Take 200 mg by mouth every morning       Multiple Vitamins-Minerals (SENIOR MULTIVITAMIN PLUS PO) Take 1 tablet by mouth daily      NONFORMULARY by Intrathecal route continuous - + up to 3 boluses daily    Implanted pump infusing Fentanyl, Morphine and Bupivacaine    Managed by Dr Pawan Shaw, [facility]      pregabalin (LYRICA) 100 MG capsule Take 1 capsule (100 mg) by mouth 4 times daily  Qty: 360 capsule, Refills: 3    Associated Diagnoses: Peripheral polyneuropathy      sennosides (SENOKOT) 8.6 MG tablet Take one tablet in the morning and 2 tablets in the evening.      tamsulosin (FLOMAX) 0.4 MG capsule Take 0.8 mg by mouth daily   Refills: 3      vitamin C (ASCORBIC ACID) 1000 MG TABS Take 1,000 mg by mouth daily      blood glucose (ACCU-CHEK COMPACT DRUM) test strip Use to test blood sugars 3 to 4 times daily.  Qty: 400 strip, Refills: 1    Associated Diagnoses: Type 2 diabetes mellitus without complication, without long-term current use of insulin (H)      blood glucose (ACCU-CHEK MULTICLIX) lancing device Lancing device to be used with lancets.  Qty: 1 each, Refills: 0    Comments: Okay to subsitute.  Associated Diagnoses: Type 2 diabetes mellitus without complication, without long-term current use of insulin (H)      blood glucose monitoring (SOFTCLIX) lancets USE TO TEST BLOOD SUGAR 3-4 TIMES DAILY OR AS DIRECTED.  Refills: 1         STOP taking these medications       blood glucose monitoring (ACCU-CHEK COMPACT CARE KIT) meter device kit Comments:   Reason for Stopping:                 Allergies:         Allergies   Allergen Reactions     Cephalexin Diarrhea     Liraglutide Other (See Comments)     Sulfa Drugs Swelling     Pt has taken  Taken sulfa  drugs orally without trouble. He had problems with Sulfa eye drops. Eye swelled up     Victoza      Increased migraine frequency and severity           Consultations This Hospital Stay:      Consultation during this admission received from gastroenterology and oncology        Condition and Physical on Discharge:      Discharge condition: Stable   Vitals: Blood pressure 100/60, pulse 66, temperature 98.3  F (36.8  C), temperature source Oral, resp. rate 16, SpO2 100 %.     Constitutional: Alert and awake    Lungs: CTA, no rales or wheezing    Cardiovascular: RRR, no murmur    Abdomen: Soft, NT, ND, BS+   Skin: Warm and dry    Other:          Discharge Time:      Greater than 30 minutes.        Image Results From This Hospital Stay (For Non-EPIC Providers):        Results for orders placed or performed during the hospital encounter of 04/15/20   CT Abdomen Pelvis w Contrast    Narrative    CT ABDOMEN PELVIS WITH CONTRAST 4/15/2020 9:38 PM    CLINICAL HISTORY: Nausea, vomiting.     TECHNIQUE: CT scan of the abdomen and pelvis was performed following  injection of IV contrast. Multiplanar reformats were obtained. Dose  reduction techniques were used.  CONTRAST: 134 mL Isovue-[id number]    COMPARISON: CT chest abdomen pelvis 1/26/2018.    FINDINGS:   LOWER CHEST: Mild scarring and architectural distortion is noted at  the lung bases which are otherwise clear.    HEPATOBILIARY: Cholecystectomy changes. Slight nodularity to the liver  contour suggests chronic liver disease or early changes of cirrhosis.    PANCREAS: Normal.    SPLEEN: Hypodensity in the lateral aspect of the spleen on series 4,  image 67 was not appreciated on prior CT. This could be an hemangioma  or area of splenic infarct. On coronal imaging this is more linear in  appearance possibly sequela from previous infarct. Spleen is enlarged  measuring nearly 16 cm in AP dimension concerning for portal venous  hypertension.    ADRENAL GLANDS:  Normal.    KIDNEYS/BLADDER: A few subcentimeter renal cortical cysts are present.  These are too small to characterize by CT but appears stable compared  to prior exam. No hydronephrosis or urinary tract calculi. Bladder is  unremarkable.    BOWEL: No bowel obstruction, diverticulitis or appendicitis. Stomach  is nondistended. Duodenum is unremarkable in appearance.    LYMPH NODES: Multiple small jaguar hepatis and retroperitoneal lymph  nodes are present, minimally changed since prior exam. These may be  reactive in light of suspected underlying chronic liver disease or  cirrhosis.    VASCULATURE: Hepatic vasculature is patent as is the splenic vein and  SMV.    PELVIC ORGANS: Multiple small bilateral pelvic lymph nodes are present  and may be reactive. No free pelvic fluid. Prostate gland and rectum  are unremarkable. Bilateral fat-containing inguinal hernias are  present.    ADDITIONAL FINDINGS: No evidence of ascites, free intraperitoneal air  or peritoneal nodularity.    MUSCULOSKELETAL: Mild degenerative spine changes. No destructive bone  lesions. Pump reservoir is noted in the right flank with spinal canal  catheter extending off the superior aspect of the imaged area.      Impression    IMPRESSION:   1.  Findings concerning for underlying liver cirrhosis and portal  venous hypertension with splenomegaly. Multiple small reactive jaguar  hepatis, retroperitoneal and pelvic lymph nodes are suspected. None of  these are significantly enlarged by CT criteria and are minimally  changed since prior study.  2.  Indeterminate hypodensity in the spleen possibly splenic infarct  or hemangioma. This is new in the interval since prior CT.  3.  No evidence of bowel obstruction, diverticulitis or appendicitis.  Stomach and duodenum are within normal limits.    RICKY CAGE MD             Most Recent Lab Results In EPIC (For Non-EPIC Providers):    Most Recent 3 CBC's:  Recent Labs   Lab Test 04/16/20  0730 04/15/20  2024  01/09/20  1058   WBC 5.4 6.4 5.5   HGB 12.8* 14.5 12.2*   MCV 95 94 95   * 170 148*      Most Recent 3 BMP's:  Recent Labs   Lab Test 04/16/20  0730 04/15/20  2024 01/09/20  1058    131* 136   POTASSIUM 3.8 3.6 4.1   CHLORIDE 105 100 100   CO2 24 21 28   BUN 15 16 37*   CR 1.20 1.18 1.02   ANIONGAP 7 10 8   LUIS 8.2* 8.6 11.0*   * 131* 194*     Most Recent 3 Troponin's:  Recent Labs   Lab Test 01/29/18  0216 01/27/18  1556 01/26/18  2331 01/26/18  1912   TROPI 0.023 0.028 0.056*  --    TROPONIN  --   --   --  0.00     Most Recent 3 INR's:  Recent Labs   Lab Test 01/27/18  1556 02/19/17  0734 02/01/17  1848   INR 1.06 1.08 1.15*     Most Recent 2 LFT's:  Recent Labs   Lab Test 04/16/20  0730 01/09/20  1058   AST 19 31   ALT 55 36   ALKPHOS 81 76   BILITOTAL 1.0 0.4     Most Recent Cholesterol Panel:  Recent Labs   Lab Test 02/24/20  1219   CHOL 163   *   HDL 32*   TRIG 121     Most Recent 6 Bacteria Isolates From Any Culture (See EPIC Reports for Culture Details):  Recent Labs   Lab Test 01/03/19  0827 02/01/17  1904 02/01/17  1848   CULT No beta hemolytic Streptococcus Group A isolated No growth No growth     Most Recent TSH, T4 and HgbA1c:   Recent Labs   Lab Test 04/16/20  0730  08/28/18  1259   TSH  --   --  1.75   A1C 5.5   < >  --     < > = values in this interval not displayed.

## 2020-04-18 VITALS
RESPIRATION RATE: 16 BRPM | HEIGHT: 67 IN | WEIGHT: 252.3 LBS | DIASTOLIC BLOOD PRESSURE: 82 MMHG | HEART RATE: 81 BPM | BODY MASS INDEX: 39.6 KG/M2 | TEMPERATURE: 99.1 F | SYSTOLIC BLOOD PRESSURE: 128 MMHG | OXYGEN SATURATION: 95 %

## 2020-04-18 PROBLEM — N17.9 ACUTE KIDNEY FAILURE, UNSPECIFIED (H): Status: ACTIVE | Noted: 2020-04-18

## 2020-04-18 PROBLEM — N17.9 AKI (ACUTE KIDNEY INJURY) (H): Status: ACTIVE | Noted: 2020-04-18

## 2020-04-18 LAB
A1AT PHENOTYP SERPL-IMP: NORMAL
A1AT SERPL-MCNC: 141 MG/DL (ref 90–200)
ANION GAP SERPL CALCULATED.3IONS-SCNC: 8 MMOL/L (ref 3–14)
BUN SERPL-MCNC: 18 MG/DL (ref 7–30)
CALCIUM SERPL-MCNC: 7.8 MG/DL (ref 8.5–10.1)
CHLORIDE SERPL-SCNC: 110 MMOL/L (ref 94–109)
CO2 SERPL-SCNC: 22 MMOL/L (ref 20–32)
CREAT SERPL-MCNC: 1.39 MG/DL (ref 0.66–1.25)
ERYTHROCYTE [DISTWIDTH] IN BLOOD BY AUTOMATED COUNT: 14.9 % (ref 10–15)
GFR SERPL CREATININE-BSD FRML MDRD: 53 ML/MIN/{1.73_M2}
GLUCOSE BLDC GLUCOMTR-MCNC: 104 MG/DL (ref 70–99)
GLUCOSE BLDC GLUCOMTR-MCNC: 123 MG/DL (ref 70–99)
GLUCOSE BLDC GLUCOMTR-MCNC: 155 MG/DL (ref 70–99)
GLUCOSE SERPL-MCNC: 89 MG/DL (ref 70–99)
HCT VFR BLD AUTO: 31.9 % (ref 40–53)
HGB BLD-MCNC: 11 G/DL (ref 13.3–17.7)
INTERPRETATION ECG - MUSE: NORMAL
LACTATE BLD-SCNC: 2.6 MMOL/L (ref 0.7–2)
MCH RBC QN AUTO: 33.6 PG (ref 26.5–33)
MCHC RBC AUTO-ENTMCNC: 34.5 G/DL (ref 31.5–36.5)
MCV RBC AUTO: 98 FL (ref 78–100)
PLATELET # BLD AUTO: 109 10E9/L (ref 150–450)
POTASSIUM SERPL-SCNC: 4 MMOL/L (ref 3.4–5.3)
RBC # BLD AUTO: 3.27 10E12/L (ref 4.4–5.9)
SMA IGG SER-ACNC: 4 UNITS (ref 0–19)
SODIUM SERPL-SCNC: 140 MMOL/L (ref 133–144)
VALPROATE SERPL-MCNC: 31 MG/L (ref 50–100)
WBC # BLD AUTO: 6.3 10E9/L (ref 4–11)

## 2020-04-18 PROCEDURE — G0378 HOSPITAL OBSERVATION PER HR: HCPCS

## 2020-04-18 PROCEDURE — 00000146 ZZHCL STATISTIC GLUCOSE BY METER IP

## 2020-04-18 PROCEDURE — 80164 ASSAY DIPROPYLACETIC ACD TOT: CPT | Performed by: HOSPITALIST

## 2020-04-18 PROCEDURE — 99207 ZZC CDG-CODE CATEGORY CHANGED: CPT | Performed by: HOSPITALIST

## 2020-04-18 PROCEDURE — 80048 BASIC METABOLIC PNL TOTAL CA: CPT | Performed by: HOSPITALIST

## 2020-04-18 PROCEDURE — 99217 ZZC OBSERVATION CARE DISCHARGE: CPT | Performed by: HOSPITALIST

## 2020-04-18 PROCEDURE — 36415 COLL VENOUS BLD VENIPUNCTURE: CPT | Performed by: HOSPITALIST

## 2020-04-18 PROCEDURE — 25800030 ZZH RX IP 258 OP 636: Performed by: HOSPITALIST

## 2020-04-18 PROCEDURE — 83605 ASSAY OF LACTIC ACID: CPT | Performed by: HOSPITALIST

## 2020-04-18 PROCEDURE — 25000132 ZZH RX MED GY IP 250 OP 250 PS 637: Performed by: HOSPITALIST

## 2020-04-18 PROCEDURE — 85027 COMPLETE CBC AUTOMATED: CPT | Performed by: HOSPITALIST

## 2020-04-18 RX ORDER — METFORMIN HCL 500 MG
500 TABLET, EXTENDED RELEASE 24 HR ORAL 2 TIMES DAILY WITH MEALS
Qty: 360 TABLET | Refills: 0
Start: 2020-04-18 | End: 2020-05-12

## 2020-04-18 RX ORDER — LURASIDONE HYDROCHLORIDE 40 MG/1
40 TABLET, FILM COATED ORAL AT BEDTIME
Status: DISCONTINUED | OUTPATIENT
Start: 2020-04-18 | End: 2020-04-18 | Stop reason: HOSPADM

## 2020-04-18 RX ORDER — ONDANSETRON 4 MG/1
4 TABLET, ORALLY DISINTEGRATING ORAL EVERY 6 HOURS PRN
Status: DISCONTINUED | OUTPATIENT
Start: 2020-04-18 | End: 2020-04-18 | Stop reason: HOSPADM

## 2020-04-18 RX ORDER — SENNOSIDES 8.6 MG
1-2 TABLET ORAL 2 TIMES DAILY
Status: DISCONTINUED | OUTPATIENT
Start: 2020-04-18 | End: 2020-04-18 | Stop reason: HOSPADM

## 2020-04-18 RX ORDER — PREGABALIN 100 MG/1
100 CAPSULE ORAL 4 TIMES DAILY
Status: DISCONTINUED | OUTPATIENT
Start: 2020-04-18 | End: 2020-04-18 | Stop reason: HOSPADM

## 2020-04-18 RX ORDER — SODIUM CHLORIDE 9 MG/ML
INJECTION, SOLUTION INTRAVENOUS CONTINUOUS
Status: ACTIVE | OUTPATIENT
Start: 2020-04-18 | End: 2020-04-18

## 2020-04-18 RX ORDER — ACETAMINOPHEN 325 MG/1
650 TABLET ORAL EVERY 4 HOURS PRN
Status: DISCONTINUED | OUTPATIENT
Start: 2020-04-18 | End: 2020-04-18 | Stop reason: HOSPADM

## 2020-04-18 RX ORDER — DEXTROSE MONOHYDRATE 25 G/50ML
25-50 INJECTION, SOLUTION INTRAVENOUS
Status: DISCONTINUED | OUTPATIENT
Start: 2020-04-18 | End: 2020-04-18 | Stop reason: HOSPADM

## 2020-04-18 RX ORDER — DIVALPROEX SODIUM 500 MG/1
1000 TABLET, DELAYED RELEASE ORAL DAILY
Status: DISCONTINUED | OUTPATIENT
Start: 2020-04-18 | End: 2020-04-18 | Stop reason: HOSPADM

## 2020-04-18 RX ORDER — NALOXONE HYDROCHLORIDE 0.4 MG/ML
.1-.4 INJECTION, SOLUTION INTRAMUSCULAR; INTRAVENOUS; SUBCUTANEOUS
Status: DISCONTINUED | OUTPATIENT
Start: 2020-04-18 | End: 2020-04-18 | Stop reason: HOSPADM

## 2020-04-18 RX ORDER — GLIPIZIDE 2.5 MG/1
2.5 TABLET, EXTENDED RELEASE ORAL DAILY
Status: DISCONTINUED | OUTPATIENT
Start: 2020-04-18 | End: 2020-04-18 | Stop reason: HOSPADM

## 2020-04-18 RX ORDER — ONDANSETRON 2 MG/ML
4 INJECTION INTRAMUSCULAR; INTRAVENOUS EVERY 6 HOURS PRN
Status: DISCONTINUED | OUTPATIENT
Start: 2020-04-18 | End: 2020-04-18 | Stop reason: HOSPADM

## 2020-04-18 RX ORDER — MODAFINIL 200 MG/1
200 TABLET ORAL EVERY MORNING
Status: DISCONTINUED | OUTPATIENT
Start: 2020-04-18 | End: 2020-04-18 | Stop reason: HOSPADM

## 2020-04-18 RX ORDER — NICOTINE POLACRILEX 4 MG
15-30 LOZENGE BUCCAL
Status: DISCONTINUED | OUTPATIENT
Start: 2020-04-18 | End: 2020-04-18 | Stop reason: HOSPADM

## 2020-04-18 RX ORDER — ACETAMINOPHEN 650 MG/1
650 SUPPOSITORY RECTAL EVERY 4 HOURS PRN
Status: DISCONTINUED | OUTPATIENT
Start: 2020-04-18 | End: 2020-04-18 | Stop reason: HOSPADM

## 2020-04-18 RX ORDER — POLYETHYLENE GLYCOL 3350 17 G/17G
17 POWDER, FOR SOLUTION ORAL DAILY PRN
Status: DISCONTINUED | OUTPATIENT
Start: 2020-04-18 | End: 2020-04-18 | Stop reason: HOSPADM

## 2020-04-18 RX ORDER — LORAZEPAM 0.5 MG/1
0.25 TABLET ORAL ONCE
Status: COMPLETED | OUTPATIENT
Start: 2020-04-18 | End: 2020-04-18

## 2020-04-18 RX ADMIN — PREGABALIN 100 MG: 100 CAPSULE ORAL at 00:48

## 2020-04-18 RX ADMIN — DIVALPROEX SODIUM 1000 MG: 500 TABLET, DELAYED RELEASE ORAL at 08:57

## 2020-04-18 RX ADMIN — SODIUM CHLORIDE: 9 INJECTION, SOLUTION INTRAVENOUS at 02:56

## 2020-04-18 RX ADMIN — SODIUM CHLORIDE: 9 INJECTION, SOLUTION INTRAVENOUS at 11:02

## 2020-04-18 RX ADMIN — LURASIDONE HYDROCHLORIDE 40 MG: 40 TABLET, FILM COATED ORAL at 02:58

## 2020-04-18 RX ADMIN — GLIPIZIDE 2.5 MG: 2.5 TABLET, FILM COATED, EXTENDED RELEASE ORAL at 08:57

## 2020-04-18 RX ADMIN — SENNOSIDES 2 TABLET: 8.6 TABLET, FILM COATED ORAL at 08:57

## 2020-04-18 RX ADMIN — LORAZEPAM 0.25 MG: 0.5 TABLET ORAL at 04:51

## 2020-04-18 RX ADMIN — SENNOSIDES 1 TABLET: 8.6 TABLET, FILM COATED ORAL at 00:48

## 2020-04-18 RX ADMIN — SODIUM CHLORIDE 1000 ML: 9 INJECTION, SOLUTION INTRAVENOUS at 00:48

## 2020-04-18 RX ADMIN — MODAFINIL 200 MG: 200 TABLET ORAL at 08:57

## 2020-04-18 RX ADMIN — PREGABALIN 100 MG: 100 CAPSULE ORAL at 08:58

## 2020-04-18 RX ADMIN — PREGABALIN 100 MG: 100 CAPSULE ORAL at 12:56

## 2020-04-18 ASSESSMENT — MIFFLIN-ST. JEOR: SCORE: 1898.06

## 2020-04-18 NOTE — PROGRESS NOTES
SW    D) Patient is requesting a ride home.    I) Met with patient. He agrees to use [employer] wheelchair transport and was advised of the private pay charge/rate for this ride. He states he understands he will be billed for this and it is not covered under his insurance. Patient has keys and there are no steps.  Arranged a ride at 1545 which is the next available ride time. The bedside RN was provided the ride time and she will update patient.    P) No other SW needs evident. Patient is discharging home, where he lives independently.

## 2020-04-18 NOTE — PROVIDER NOTIFICATION
"MD Notification    Notified Person: MD    Notified Person Name: Trudy Sandoval     Notification Date/Time: 4/18/2020 at 0424    Notification Interaction: AMCOM    Purpose of Notification: \"pt very anxious and requesting a prn anxiolytic. Pt states Ativan helps.\"    Orders Received: One time dose of Ativan ordered    Comments:      "

## 2020-04-18 NOTE — PROVIDER NOTIFICATION
MD Notification    Notified Person: MD    Notified Person Name: Dr. Silvestre    Notification Date/Time:4-18-20 att 1530    Notification Interaction: Spoke with mD    Purpose of Notification: Pt ready for discharge. temp 99.1.    Orders Received:OK to Discharge , instruct pt to monitor temp at home, follow up if 100.4 or greater, or other symptoms.     Comments:

## 2020-04-18 NOTE — ED NOTES
Bed: ED21  Expected date: 4/17/20  Expected time: 7:50 PM  Means of arrival: Ambulance  Comments:  [address] 64M hypotension; no Cov. Sympt.

## 2020-04-18 NOTE — PROGRESS NOTES
MD Notification    Notified Person: MD    Notified Person Name: Trudy Sandoval     Notification Date/Time: 4/18/20 at 1230    Notification Interaction: Amcom    Purpose of Notification:  Paged about lactic redraw at 3.1    Orders Received: 1000 L NS bolus to run over 2 hours    Comments:

## 2020-04-18 NOTE — PROGRESS NOTES
Observation     -diagnostic tests and consults completed and resulted - not met. Lactic still elevated.  -vital signs normal or at patient baseline - not met. Soft blood pressures  -tolerating oral intake to maintain hydration - met.   -returns to baseline functional status - not met. Stand by assist right now, but independent at home.     Nurse to notify provider when observation goals have been met and patient is ready for discharge.

## 2020-04-18 NOTE — PLAN OF CARE
Alert and oriented x 4. Denies pain. Neuropathy at baseline with BLE numbness/tingling. Tolerating diet with no nausea. Administered 1 time dose of Ativan for anxiety. Voiding adequately. Passing gas. No BM yet and states he feels constipated. Additional 1000 mL bolus given for elevated lactate and redraw this am. Dizziness at times. Orthostatics negative. Call light appropriate.

## 2020-04-18 NOTE — PLAN OF CARE
A&O x4. VSS on RA. C/o chronic pain to back. Managed with intrathecal pump and PTA oral medications. Up SBA. Denies dizziness/lightheadedness with ambulation. Mod carb diet. Tolerating well. BG 89, 155. PIV infusing NS @125ml/hr. Lactic recheck 2.6. LS dim in bases. BS active, + Flatus. Reports LBM was 3 days ago. Scheduled senna given. Declined additional interventions at this time. Voiding adequately in BR. Progressing toward plan of care. Discharge home this evening pending.

## 2020-04-18 NOTE — PHARMACY-ADMISSION MEDICATION HISTORY
Pharmacy Medication History  Admission medication history interview status for the 4/17/2020  admission is complete. See EPIC admission navigator for prior to admission medications     Medication history sources: Spoke w/ patient.  Reviewed recent discharge summary.   Medication history source reliability: Moderate  Adherence assessment: Moderate    Additional medication history information:   - Updated pain pump information based on order from recent hospitalization.      - Patient states that when he started to feel dizzy this afternoon he took a dose of Lisinopril and Modafinil while at home.  Patient received both medications this morning prior to discharging from the hospital.      Medication reconciliation completed by provider prior to medication history? No    Time spent in this activity: 15 minutes      Prior to Admission medications    Medication Sig Last Dose Taking? Auth Provider   aspirin (ASPIRIN LOW DOSE) 81 MG tablet Take 1 tablet (81 mg) by mouth daily 4/17/2020 at am Yes Vince Henry MD   atorvastatin (LIPITOR) 10 MG tablet TAKE 1 TABLET(10 MG) BY MOUTH DAILY Past Week at Unknown time Yes Vince Henry MD   divalproex (DEPAKOTE) 500 MG EC tablet Take 2 tablets (1,000 mg) by mouth daily 4/17/2020 at am Yes Spike Tobin MD   glipiZIDE (GLUCOTROL XL) 2.5 MG 24 hr tablet Take 1 tablet (2.5 mg) by mouth daily 4/17/2020 at am Yes Vince Henry MD   hydrochlorothiazide (HYDRODIURIL) 12.5 MG tablet Take 2 tablets (25 mg) by mouth daily Past Week at Unknown time Yes Vince Henry MD   lisinopril (PRINIVIL/ZESTRIL) 20 MG tablet Take 1 tablet (20 mg) by mouth daily 4/17/2020 at x 2 doses (see note) Yes Vince Henry MD   lurasidone (LATUDA) 40 MG TABS tablet Take 40 mg by mouth At Bedtime 4/16/2020 at hs Yes Unknown, Entered By History   metFORMIN (GLUCOPHAGE-XR) 500 MG 24 hr tablet Take 2 tablets (1,000 mg) by mouth 2 times daily (with meals) Past Week at Unknown  time Yes Vince Henry MD   modafinil (PROVIGIL) 200 MG tablet Take 200 mg by mouth every morning  4/17/2020 at x 2 doses (see note) Yes Reported, Patient   Multiple Vitamins-Minerals (SENIOR MULTIVITAMIN PLUS PO) Take 1 tablet by mouth daily Past Week at Unknown time Yes Reported, Patient   NONFORMULARY by Intrathecal route continuous - + up to 3 boluses daily    Managed by [facility]     Medications in Pump:  fentanyl 2000mcg/mL  Bupivacaine 20mg/mL  Morphine 5.8mg/mL     Rate:   Fentanyl 900mcg/day  Bupivacaine 9mg/day  Morphine 2.6mg/day  Pump Last Fill Date:  4/16/2020  Yes Unknown, Entered By History   pregabalin (LYRICA) 100 MG capsule Take 1 capsule (100 mg) by mouth 4 times daily 4/17/2020 at x 3 doses Yes Vince Henry MD   sennosides (SENOKOT) 8.6 MG tablet Take one tablet in the morning and 2 tablets in the evening. 4/17/2020 at pm Yes    tamsulosin (FLOMAX) 0.4 MG capsule Take 0.8 mg by mouth daily  4/17/2020 at am Yes Reported, Patient   vitamin C (ASCORBIC ACID) 1000 MG TABS Take 1,000 mg by mouth daily Past Week at Unknown time Yes Reported, Patient   blood glucose (ACCU-CHEK COMPACT DRUM) test strip Use to test blood sugars 3 to 4 times daily.   Vince Henry MD   blood glucose (ACCU-CHEK MULTICLIX) lancing device Lancing device to be used with lancets.   Vince Henry MD   blood glucose monitoring (SOFTCLIX) lancets USE TO TEST BLOOD SUGAR 3-4 TIMES DAILY OR AS DIRECTED.   Reported, Patient     April Cheema, PharmD, BCPS

## 2020-04-18 NOTE — ED TRIAGE NOTES
Pt was discharged from hospital today and this elder developed dizziness and weakness. Took BP at home and noted it to be low.

## 2020-04-18 NOTE — H&P
[facility]    History and Physical  Hospitalist       Date of Admission:  4/17/2020    Assessment & Plan   Parmjit Hernández is a 64 year old male who presents with dizziness and hypotension at home, found to have acute kidney injury.    Dizziness  Hypotension  Acute kidney injury and lactic acidosis likely secondary to hypotensive insult  Cr during recent admission near 1-1.2, gordon to 2.09. Felt dizzy at home, then took EXTRA dose lisinopril 20mg (for total 40mg dose received on 4/17) and modafanil, called EMS and found to have BP 60-70s/30s. KWABENA Likely in setting of hypotension seen on admission. Received IVF bolus x2 in ED, expect his creatinine will improve quickly as BP responded well to IVF in ED. Dizziness resolved in ED. Orthostatics were negative after first liter IVF. Lactic acid 4.2, improved to 3.1 after 2L IVF in ED, give another 1L IVF bolus (total 3)  - Admit observation  - IVF continuous overnight at 125ml/hr x12 hours  - AM BMP  - Hold lisinopril/hctz in AM  - Ambulate with nursing, if signs of assistance needed, will consult PT for evaluation.  - Orthostatic blood pressures  - lactic acid recheck in AM    Diarrhea presumed from opioid withdrawal   COVID negative 4/15  Reported 10-day history of profuse watery diarrhea, 12-15 times per day, no blood or mucus noted which prompted his admission 4/15. Diarrhea resolved during admission and no stool samples collected. It was thought that his diarrhea was secondary to opioid withdrawal as his intrathecal pump needed refill (Completed during recent admission)  - Watch for diarrhea here, he has resumed his home stool softeners.    Chronic abdominal pain  Narcotic dependence  Chronic abdominal pain, has an intrathecal pump which was filled during recent admit 4/15-4/17.      Questionable splenic infarct versus hemangioma on CT scan  Lesion was also seen on multiple CT scans in 2018 and was not seen in the most recent CT so most likely  hemangioma  Heme-onc consultation agreed most likely hemangioma and recommended follow up imaging (abdominal US) in 6mo.      Other cirrhosis of liver (H) possibly from vences   - New information for the patient during 4/15-4/17 admit  Stable monitor closely     Hyperlipidemia LDL goal <100  - PTA statin, hold for observation status    Hypertension goal BP (blood pressure) < 140/90  PTA lisinopril 20mg daily and hydrochlorothiazide 25mg daily  - Took extra dose on 4/17 prompting admission for low BP/dizziness  - Hold lisinopril and hydrochlorothiazide, recommend holding these for few days longer after discharge.    Bipolar I disorder  PTA depakote 1000mg daily. Level was <5 on 4/16, was 60 on 10/7/2019. Unsure if he missed doses in the meantime or if he was just not absorbing while he was having profuse diarrhea.   - Consideration for increase to 1500mg daily, but first will repeat depakote level in AM as now that he has had some more regular dosing we may begin to see trend upwards in level  - If upward trend seen, can follow up with PCP in 1 week for repeat level.    Other amyloidosis (H)   - He is followed up with hematology Dr. Romeo status post peripheral stem cell transplant    Type 2 diabetes mellitus without complication, without long-term current use of insulin (H)  - Continue PTA glipizide, diabetic diet, moderate resistance sliding scale coverage  - Hold metformin, resume on discharge    Restless legs syndrome (RLS)  Morbid obesity (H)    Chronic anemia  Appears to be close to baseline with hgb 11.5 on admission, as baseline near 11.    DVT Prophylaxis: Low Risk/Ambulatory with no VTE prophylaxis indicated  Code Status: Full Code  Expected discharge: 24-28 hours, recommended to prior living arrangement once blood pressure stable and renal function improved.    Trudy Sandoval DO    Primary Care Physician   Vince Henry    Chief Complaint   Dizziness, low blood pressure    History is obtained from  the patient    History of Present Illness   Parmjit Hernández is a 64 year old male who presented from home after discharge early afternoon 4/17. He said he felt full of vim and vigor on discharge and was able to ambulate and work on chores when he got home. When he was taking his sheets down to the trash around 5pm, he noted he was dizzy/light headed. He decided to take a modafanil and another dose of lisinopril, then felt worse. EMS called and found BP 60-70s/30. BP responded well to IVF in ED and he no longer had dizziness. Otherwise no new symptoms, has not had BM, but did take 2 senna when he got home and feels he may have a bowel movement while in the ED.    Past Medical History    I have reviewed this patient's medical history and updated it with pertinent information if needed.   Past Medical History:   Diagnosis Date     Allergic state      Amyloidosis (H)      Diabetes mellitus (H)     type 2     Headache(784.0)      Hypertension 2007     Myalgia and myositis, unspecified      Obsessive-compulsive disorders      Other acquired absence of organ 94     Other specified viral warts      Pain in the abdomen      Pure hypercholesterolemia      Sleep apnea        Past Surgical History   I have reviewed this patient's surgical history and updated it with pertinent information if needed.  Past Surgical History:   Procedure Laterality Date     BMT PROTOCOL      for systemic amyloidosis     BONE MARROW BIOPSY, BONE SPECIMEN, NEEDLE/TROCAR N/A 6/8/2016    Procedure: BIOPSY BONE MARROW;  Surgeon: Nathan Agrawal MD;  Location:  GI     BONE MARROW BIOPSY, BONE SPECIMEN, NEEDLE/TROCAR N/A 2/20/2019    Procedure: BIOPSY BONE MARROW;  Surgeon: Michael Raygoza MD;  Location:  GI     C NONSPECIFIC PROCEDURE  94    Cholecystectomy     C NONSPECIFIC PROCEDURE  2000    repair deviated septum     CHOLECYSTECTOMY  1995    lap qian     COLONOSCOPY  2012    hx polyps     ESOPHAGOSCOPY, GASTROSCOPY,  DUODENOSCOPY (EGD), COMBINED N/A 5/29/2015    Procedure: COMBINED ESOPHAGOSCOPY, GASTROSCOPY, DUODENOSCOPY (EGD), BIOPSY SINGLE OR MULTIPLE;  Surgeon: John Jacob MD;  Location:  GI     HERNIA REPAIR, UMBILICAL  2006       Prior to Admission Medications   Prior to Admission Medications   Prescriptions Last Dose Informant Patient Reported? Taking?   Multiple Vitamins-Minerals (SENIOR MULTIVITAMIN PLUS PO)  Self Yes No   Sig: Take 1 tablet by mouth daily   NONFORMULARY  Self Yes No   Sig: by Intrathecal route continuous - + up to 3 boluses daily    Implanted pump infusing Fentanyl, Morphine and Bupivacaine    Managed by Dr Pawan Shaw, [facility]   aspirin (ASPIRIN LOW DOSE) 81 MG tablet  Self No No   Sig: Take 1 tablet (81 mg) by mouth daily   atorvastatin (LIPITOR) 10 MG tablet  Self No No   Sig: TAKE 1 TABLET(10 MG) BY MOUTH DAILY   blood glucose (ACCU-CHEK COMPACT DRUM) test strip  Self No No   Sig: Use to test blood sugars 3 to 4 times daily.   blood glucose (ACCU-CHEK MULTICLIX) lancing device  Self No No   Sig: Lancing device to be used with lancets.   blood glucose monitoring (ACCU-CHEK COMPACT CARE KIT) meter device kit  Self No No   Sig: Use to test blood sugars 3 to 4  times daily.   blood glucose monitoring (SOFTCLIX) lancets  Self Yes No   Sig: USE TO TEST BLOOD SUGAR 3-4 TIMES DAILY OR AS DIRECTED.   divalproex (DEPAKOTE) 500 MG EC tablet  Self No No   Sig: Take 2 tablets (1,000 mg) by mouth daily   glipiZIDE (GLUCOTROL XL) 2.5 MG 24 hr tablet  Self No No   Sig: Take 1 tablet (2.5 mg) by mouth daily   hydrochlorothiazide (HYDRODIURIL) 12.5 MG tablet  Self No No   Sig: Take 2 tablets (25 mg) by mouth daily   lisinopril (PRINIVIL/ZESTRIL) 20 MG tablet  Self No No   Sig: Take 1 tablet (20 mg) by mouth daily   lurasidone (LATUDA) 40 MG TABS tablet  Self Yes No   Sig: Take 40 mg by mouth At Bedtime   metFORMIN (GLUCOPHAGE-XR) 500 MG 24 hr tablet  Self No No   Sig: Take 2 tablets (1,000 mg)  by mouth 2 times daily (with meals)   modafinil (PROVIGIL) 200 MG tablet  Self Yes No   Sig: Take 200 mg by mouth every morning    pregabalin (LYRICA) 100 MG capsule  Self No No   Sig: Take 1 capsule (100 mg) by mouth 4 times daily   sennosides (SENOKOT) 8.6 MG tablet  Self Yes No   Sig: Take one tablet in the morning and 2 tablets in the evening.   tamsulosin (FLOMAX) 0.4 MG capsule  Self Yes No   Sig: Take 0.8 mg by mouth daily    vitamin C (ASCORBIC ACID) 1000 MG TABS  Self Yes No   Sig: Take 1,000 mg by mouth daily      Facility-Administered Medications: None     Allergies   Allergies   Allergen Reactions     Cephalexin Diarrhea     Liraglutide Other (See Comments)     Sulfa Drugs Swelling     Pt has taken  Taken sulfa drugs orally without trouble. He had problems with Sulfa eye drops. Eye swelled up     Victoza      Increased migraine frequency and severity       Social History   I have reviewed this patient's social history and updated it with pertinent information if needed. Parmjit VILLEDA Betty  reports that he has never smoked. He has never used smokeless tobacco. He reports that he does not drink alcohol or use drugs.    Family History   I have reviewed this patient's family history and updated it with pertinent information if needed.   Family History   Problem Relation Age of Onset     Cerebrovascular Disease Mother      C.A.D. Mother      Hypertension Mother      Alcohol/Drug Mother      Arthritis Mother      Cerebrovascular Disease Maternal Grandmother      C.A.D. Maternal Grandmother      Alcohol/Drug Maternal Grandmother      C.A.D. Father      Heart Disease Father      Hypertension Father      Alcohol/Drug Father      Allergies Father      Circulatory Father      Depression Father      Respiratory Father      Asthma Father      Alcohol/Drug Paternal Grandfather      Cerebrovascular Disease Paternal Grandfather      Depression Son      Psychotic Disorder Son         anxiety disorder     Depression Daughter       Cerebrovascular Disease Maternal Grandfather      Cerebrovascular Disease Paternal Grandmother      Diabetes Brother      Allergies Sister      Depression Sister      Gynecology Sister        Review of Systems   The 10 point Review of Systems is negative other than noted in the HPI    Physical Exam       BP: 117/68 Pulse: 82            Vital Signs with Ranges  Temp:  [97.2  F (36.2  C)-98.7  F (37.1  C)] 97.2  F (36.2  C)  Pulse:  [66-96] 82  Resp:  [16] 16  BP: ()/(56-75) 117/68  SpO2:  [93 %-100 %] 93 %  0 lbs 0 oz    Constitutional: Awake, alert, cooperative, no apparent distress.  Eyes: Conjunctiva and pupils examined and normal.  HEENT: Moist mucous membranes, normal dentition.  Respiratory: Clear to auscultation bilaterally, no crackles or wheezing.  Cardiovascular: Regular rate and rhythm, normal S1 and S2, and no murmur noted.  GI: Soft, slightly bloated, non-tender, active bowel sounds.  Lymph/Hematologic: No anterior cervical or supraclavicular adenopathy.  Skin: No rashes, no cyanosis, +1 lower extremity edema (Chronic)  Musculoskeletal: No joint swelling, erythema or tenderness.  Neurologic: Cranial nerves 2-12 intact, normal strength and sensation.  Psychiatric: Alert, oriented to person, place and time, no obvious anxiety or depression. Verbose    Data   Data reviewed today:  I personally reviewed no imaging. EKG sinus with RBBB  Recent Labs   Lab 04/17/20 2009 04/16/20  0730 04/15/20  2024   WBC 9.2 5.4 6.4   HGB 11.5* 12.8* 14.5   MCV 97 95 94    143* 170   INR 1.18*  --   --     136 131*   POTASSIUM 4.1 3.8 3.6   CHLORIDE 103 105 100   CO2 23 24 21   BUN 23 15 16   CR 2.09* 1.20 1.18   ANIONGAP 9 7 10   LUIS 8.4* 8.2* 8.6   * 114* 131*   ALBUMIN 3.0* 3.0*  --    PROTTOTAL 5.5* 5.6*  --    BILITOTAL 0.6 1.0  --    ALKPHOS 72 81  --    ALT 43 55  --    AST 14 19  --    LIPASE 249  --   --    TROPI 0.040  --   --        No results found for this or any previous visit (from  the past 24 hour(s)).

## 2020-04-18 NOTE — PROGRESS NOTES
Pt . DC'd to home via "[facility] [place] transport at 1545, discharge instructions reviewed with pt, verbal understanding given of med changes, follow up appt and symptoms needing medical care. Temp 99.1 upon discontinue, Dr. Silverio notified--ok to discontinue with pt monitoring temp at home. Other VSS upon discharge.

## 2020-04-18 NOTE — ED PROVIDER NOTES
History     Chief Complaint:  Dizziness and Hypotension       HPI   Parmjit Hernández is a 64 year old male with a history of type 2 diabetes mellitus with associated neuropathy, hypertension, hyperlipidemia, cirrhosis of the liver, and bipolar 1 disorder, who presents for evaluation of dizziness and hypotension. Per chart review, the patient was hospitalized 2 days ago after 10 days of diarrhea and COVID-19 testing was negative. The patient was discharged from the hospital around 1400 today. The patient notes he was feeling well prior to discharge, although he began feeling dizzy and generally weak when he returned home around 1545. The patient endorses taking Lisinopril after his dizziness started. Per EMS, the patient was hypotensive with pressures 60's-70's/30's and the final blood pressure reading 79/54 after receiving fluids. Blood sugar 101. Oxygen saturations 89% on room air, which improved to 95% on 2 liters of oxygen. Here, the patient does not feel light-headed or dizzy currently. He denies shortness of breath, nausea, vomiting, diarrhea, or cough.       Allergies:  Cephalexin  Liraglutide  Sulfa Drugs  Victoza    Medications:   Aspirin 81 mg  Atorvastatin  Depakote  Doxepin  Glipizide  Hydrochlorothiazide  Lisinopril  Lurasidone  Metformin  Modafinil  Lyrica  Sennosides  Skyrizi  Flomax     Past Medical History:    Amyloidosis  Diabetes mellitus type 2  Polyneuropathy   Hypertension   Migraines  OCD  Hyperlipidemia  Obstructive sleep apnea  Diverticulitis  Bipolar 1 disorder  Anxiety  Insomnia  Narcotic dependence  Restless leg syndrome   Morbid obesity  ADD  GERD  Depression  Renal failure  Cirrhosis of liver  Peripheral stem cell transplant      Past Surgical History:    BMT protocol  Bone marrow biopsy x2  Cholecystectomy  Repair deviated septum  Umbilical hernia repair   EGD     Family History:    Mother: cerebrovascular disease, CAD, hypertension, alcohol/drug use, arthritis   Father: CAD, heart  disease, hypertension, alcohol/drug use, allergies, depression, asthma  Son: depression, anxiety  Daughter: depression  Brother: diabetes  Sister: allergies, depression    Social History:  The patient presents unaccompanied to the ED.  PCP: Vince Henry  Smoking status: Never Smoker  Smokeless tobacco: Never Used  Alcohol use: Negative   Drug use: Negative    Review of Systems   Respiratory: Negative for cough and shortness of breath.    Gastrointestinal: Negative for diarrhea, nausea and vomiting.   Neurological: Positive for dizziness and weakness. Negative for light-headedness.   All other systems reviewed and are negative.      Physical Exam     Patient Vitals for the past 24 hrs:   BP Pulse   04/17/20 2100 117/68 82   04/17/20 2050 118/75 --   04/17/20 2030 96/56 83        Orthostatics:   Laying blood pressure: 81/49   Laying heart rate: 89     Sitting blood pressure: 71/36  Sitting heart rate: 101    Standing blood pressure: 76/48  Standing heart rate: 100    Physical Exam  Nursing note and vitals reviewed.  Constitutional:  Oriented to person, place, and time. Cooperative.   HENT:   Nose:    Nose normal.   Mouth/Throat:   Mucous membranes are normal.   Eyes:    Conjunctivae normal and EOM are normal.      Pupils are equal, round, and reactive to light.   Neck:    Trachea normal.   Cardiovascular:  Normal rate, regular rhythm, normal heart sounds and normal pulses. No murmur heard.  Pulmonary/Chest:  Effort normal and breath sounds normal.   Abdominal:   Soft. Normal appearance and bowel sounds are normal.      There is no tenderness.      There is no rebound and no CVA tenderness.   Musculoskeletal:  Extremities atraumatic x 4. 1+ bilateral lower extremity edema.   Lymphadenopathy:  No cervical adenopathy.   Neurological:   Alert and oriented to person, place, and time. Normal strength.      No cranial nerve deficit or sensory deficit. GCS eye subscore is 4. GCS verbal subscore is 5. GCS motor subscore  is 6.   Skin:    Skin is intact. No rash noted.   Psychiatric:   Normal mood and affect.       Emergency Department Course   ECG   Time: 2007  Vent. Rate 92 bpm. TX interval 198. QRS duration 126. QT/QTc 370/457. P-R-T axis 49 -64 19.  Normal sinus rhythm   Left axis deviation   Right bundle branch block   Inferior infarct, age undetermined  Abnormal ECG  No significant change compared to EKG dated 1/29/2018.   Read time: 2011      Laboratory:  Laboratory findings were communicated with the patient who voiced understanding of the findings.    CBC: WBC: 9.2, HGB 11.5 (L), PLT: 164    CMP: Glucose 101 (H), Creatinine 2.09 (H), GFR Estimate 33 (L), Calcium 8.4 (L), Albumin 3.0 (L), Protein Total 5.5 (L), o/w WNL     Lactic Acid (2008): 4.2 (H)    Troponin (Collected 2009): 0.040     INR: 1.18 (H)    Ketone Beta-Hydroxybutyrate Quantitative: 0.1    Lipase: 249    UA: ordered    Interventions:  2014 NS 1000 mL IV Bolus   2120 NS 1000 mL IV Bolus     Emergency Department Course:  Past medical records, nursing notes, and vitals reviewed.   2048 I performed an exam of the patient and obtained history, as documented above.    EKG obtained in the ED, see results above.      IV was inserted and blood was drawn for laboratory testing, results above.     The patient provided a urine sample here in the emergency department. This was sent for laboratory testing, findings above.     IV fluids administered.     2035 I rechecked the patient. Explained findings to the Patient.    2128 I spoke with Dr. Sandoval, hospitalist, who agreed to admit the patient.     Findings and plan explained to the Patient who consents to an observation admission. Discussed the patient with Dr. Sandoval, who will admit the patient to observation to an Adult Med/Surg bed for further monitoring, evaluation, and treatment.     I personally reviewed the laboratory results with the Patient and answered all related questions prior to admission.     Impression & Plan       Medical Decision Making:  This is a 64-year-old male who was brought in by EMS after he developed dizziness today and was found to be hypotensive per EMS.  Unfortunately, he also had taken his lisinopril after his dizziness started, which I suspect is contributing to his hypotension.  His blood pressures responded quite well to IV fluids though, which is reassuring.  However his kidney function has worsened just since yesterday, and therefore I think it is best that he be brought into hospital for further evaluation and management.  Hopefully he has kidney injury will improve with IV fluids.  I subsequently spoke with Dr. Sandoval, who will be taking care of the patient.    Diagnosis:    ICD-10-CM    1. Hypotension, unspecified hypotension type  I95.9    2. Acute kidney injury (H)  N17.9    3. Dizziness  R42         Disposition:  Admitted to Med/Surg under observation care.      Scribe Disclosure:  I, Gloria Yarbrough, am serving as a scribe at 8:06 PM on 4/17/2020 to document services personally performed by Bubba Vizcaino MD based on my observations and the provider's statements to me.     4/17/2020   Bubba Franco MD  04/17/20 [id number]

## 2020-04-18 NOTE — ED NOTES
DATE:  4/17/2020   TIME OF RECEIPT FROM LAB:  8:31 PM  LAB TEST:  Lactate  LAB VALUE:  4.2  RESULTS GIVEN WITH READ-BACK TO (PROVIDER):  Lashkowitz  TIME LAB VALUE REPORTED TO PROVIDER:   8:32 PM

## 2020-04-18 NOTE — PROGRESS NOTES
RECEIVING UNIT ED HANDOFF REVIEW    ED Nurse Handoff Report was reviewed by: Susan Estrada RN on April 17, 2020 at 11:38 PM

## 2020-04-18 NOTE — DISCHARGE SUMMARY
[facility]  Hospitalist Discharge Summary      Date of Admission:  4/17/2020  Date of Discharge:  4/18/2020  Discharging Provider: Raphael Silvestre DO      Discharge Diagnoses   Hypotension from lisinopril overdose  KWABENA from prerenal azotemia  Lactic acidosis from hypovolumia    Follow-ups Needed After Discharge   Follow-up Appointments     Follow-up and recommended labs and tests       Follow up with primary care provider, Vince Henry, within 7 days to   evaluate medication change and for hospital follow- up.  The following   labs/tests are recommended: BMP.             Unresulted Labs Ordered in the Past 30 Days of this Admission     Date and Time Order Name Status Description    4/17/2020 0107 F Actin EIA with reflex In process     4/17/2020 0107 Alpha 1 Antitrypsin In process       These results will be followed up by [facility]    Discharge Disposition   Discharged to home  Condition at discharge: Stable    Hospital Course   Dizziness  Hypotension  Acute kidney injury and lactic acidosis likely secondary to hypotensive insult  Cr during recent admission near 1-1.2, gordon to 2.09. Felt dizzy at home, then took EXTRA dose lisinopril 20mg (for total 40mg dose received on 4/17) and modafanil, called EMS and found to have BP 60-70s/30s. KWABENA Likely in setting of hypotension seen on admission. Received IVF bolus x2 in ED, expect his creatinine will improve quickly as BP responded well to IVF in ED. Dizziness resolved in ED. Orthostatics were negative after first liter IVF. Lactic acid 4.2, improved to 3.1 after 2L IVF in ED, give another 1L IVF bolus (total 3)  Creatinine did improve to 1.4 and the patient's dizziness resolved  He was able to ambulate around the hospital without difficulty  Most likely he developed dehydration causing orthostasis, leading to worsening hypotension after additional lisinopril was taken  - patient ambulated without dizziness and hypotension resolved with fluids  - Hold  lisinopril/hctz until PCP follow-up with repeat BMP  - 6-8 glasses of water daily     Diarrhea presumed from opioid withdrawal   COVID negative 4/15  Reported 10-day history of profuse watery diarrhea, 12-15 times per day, no blood or mucus noted which prompted his admission 4/15. Diarrhea resolved during admission and no stool samples collected. It was thought that his diarrhea was secondary to opioid withdrawal as his intrathecal pump needed refill (Completed during recent admission)  - Follow-up with GI as outpatient     Chronic abdominal pain  Narcotic dependence  Chronic abdominal pain, has an intrathecal pump which was filled during recent admit 4/15-4/17.      Questionable splenic infarct versus hemangioma on CT scan  Lesion was also seen on multiple CT scans in 2018 and was not seen in the most recent CT so most likely hemangioma  Heme-onc consultation agreed most likely hemangioma and recommended follow up imaging (abdominal US) in 6mo.      Other cirrhosis of liver (H) possibly from vences   - New information for the patient during 4/15-4/17 admit  Stable monitor closely  - follow-up with [facility] as outpatient     Hyperlipidemia LDL goal <100  - PTA statin, hold for observation status     Hypertension goal BP (blood pressure) < 140/90  PTA lisinopril 20mg daily and hydrochlorothiazide 25mg daily  - hold both of these until PCP fu     Bipolar I disorder  PTA depakote 1000mg daily. Level was <5 on 4/16, was 60 on 10/7/2019. Plan  - resume home meds     Other amyloidosis (H)   - He is followed up with hematology Dr. Romeo status post peripheral stem cell transplant     Type 2 diabetes mellitus without complication, without long-term current use of insulin (H)  - reduce metformin to 500 bid to avoid LA risk until PCP FU     Restless legs syndrome (RLS)  Morbid obesity (H)     Chronic anemia  Appears to be close to baseline with hgb 11.5 on admission, as baseline near 11.    Consultations This Hospital Stay    None    Code Status   Full Code    Time Spent on this Encounter   I, Raphael Silvestre DO, personally saw the patient today and spent greater than 30 minutes discharging this patient.       Raphael Silvestre DO  [facility]  ______________________________________________________________________    Physical Exam   Vital Signs: Temp: 98.2  F (36.8  C) Temp src: Oral BP: 135/73 Pulse: 81 Heart Rate: 80 Resp: 14 SpO2: 97 % O2 Device: None (Room air)    Weight: 252 lbs 4.8 oz  Constitutional: Awake, alert, cooperative, no apparent distress.  Eyes: Conjunctiva and pupils examined and normal.  HEENT: Moist mucous membranes, normal dentition.  Respiratory: Clear to auscultation bilaterally, no crackles or wheezing.  Cardiovascular: Regular rate and rhythm, normal S1 and S2, and no murmur noted.  GI: Soft, slightly bloated, non-tender, active bowel sounds.  Lymph/Hematologic: No anterior cervical or supraclavicular adenopathy.  Skin: No rashes, no cyanosis, +1 lower extremity edema (Chronic)  Musculoskeletal: No joint swelling, erythema or tenderness.  Neurologic: Cranial nerves 2-12 intact, normal strength and sensation.  Psychiatric: Alert, oriented to person, place and time, no obvious anxiety or depression. Verbose             Primary Care Physician   Vince Henry    Discharge Orders      Reason for your hospital stay    Low blood pressure from medications  Acute kidney injury from dehydration and low blood pressure     Follow-up and recommended labs and tests     Follow up with primary care provider, Vince Henry, within 7 days to evaluate medication change and for hospital follow- up.  The following labs/tests are recommended: BMP.     Activity    Your activity upon discharge: activity as tolerated and no driving for today     Full Code     Diet    Follow this diet upon discharge: Orders Placed This Encounter      Moderate Consistent CHO Diet    Drink 8 glasses of water daily        Significant Results and Procedures   Most Recent 3 CBC's:  Recent Labs   Lab Test 04/18/20  0730 04/17/20 2009 04/16/20  0730   WBC 6.3 9.2 5.4   HGB 11.0* 11.5* 12.8*   MCV 98 97 95   * 164 143*     Most Recent 3 BMP's:  Recent Labs   Lab Test 04/18/20  0730 04/17/20 2009 04/16/20  0730    135 136   POTASSIUM 4.0 4.1 3.8   CHLORIDE 110* 103 105   CO2 22 23 24   BUN 18 23 15   CR 1.39* 2.09* 1.20   ANIONGAP 8 9 7   LUIS 7.8* 8.4* 8.2*   GLC 89 101* 114*     Most Recent 2 LFT's:  Recent Labs   Lab Test 04/17/20 2009 04/16/20  0730   AST 14 19   ALT 43 55   ALKPHOS 72 81   BILITOTAL 0.6 1.0   ,   Results for orders placed or performed during the hospital encounter of 04/15/20   CT Abdomen Pelvis w Contrast    Narrative    CT ABDOMEN PELVIS WITH CONTRAST 4/15/2020 9:38 PM    CLINICAL HISTORY: Nausea, vomiting.     TECHNIQUE: CT scan of the abdomen and pelvis was performed following  injection of IV contrast. Multiplanar reformats were obtained. Dose  reduction techniques were used.  CONTRAST: 134 mL [id number]    COMPARISON: CT chest abdomen pelvis 1/26/2018.    FINDINGS:   LOWER CHEST: Mild scarring and architectural distortion is noted at  the lung bases which are otherwise clear.    HEPATOBILIARY: Cholecystectomy changes. Slight nodularity to the liver  contour suggests chronic liver disease or early changes of cirrhosis.    PANCREAS: Normal.    SPLEEN: Hypodensity in the lateral aspect of the spleen on series 4,  image 67 was not appreciated on prior CT. This could be an hemangioma  or area of splenic infarct. On coronal imaging this is more linear in  appearance possibly sequela from previous infarct. Spleen is enlarged  measuring nearly 16 cm in AP dimension concerning for portal venous  hypertension.    ADRENAL GLANDS: Normal.    KIDNEYS/BLADDER: A few subcentimeter renal cortical cysts are present.  These are too small to characterize by CT but appears stable compared  to prior exam. No  hydronephrosis or urinary tract calculi. Bladder is  unremarkable.    BOWEL: No bowel obstruction, diverticulitis or appendicitis. Stomach  is nondistended. Duodenum is unremarkable in appearance.    LYMPH NODES: Multiple small jaguar hepatis and retroperitoneal lymph  nodes are present, minimally changed since prior exam. These may be  reactive in light of suspected underlying chronic liver disease or  cirrhosis.    VASCULATURE: Hepatic vasculature is patent as is the splenic vein and  SMV.    PELVIC ORGANS: Multiple small bilateral pelvic lymph nodes are present  and may be reactive. No free pelvic fluid. Prostate gland and rectum  are unremarkable. Bilateral fat-containing inguinal hernias are  present.    ADDITIONAL FINDINGS: No evidence of ascites, free intraperitoneal air  or peritoneal nodularity.    MUSCULOSKELETAL: Mild degenerative spine changes. No destructive bone  lesions. Pump reservoir is noted in the right flank with spinal canal  catheter extending off the superior aspect of the imaged area.      Impression    IMPRESSION:   1.  Findings concerning for underlying liver cirrhosis and portal  venous hypertension with splenomegaly. Multiple small reactive jaguar  hepatis, retroperitoneal and pelvic lymph nodes are suspected. None of  these are significantly enlarged by CT criteria and are minimally  changed since prior study.  2.  Indeterminate hypodensity in the spleen possibly splenic infarct  or hemangioma. This is new in the interval since prior CT.  3.  No evidence of bowel obstruction, diverticulitis or appendicitis.  Stomach and duodenum are within normal limits.    RICKY CAGE MD       Discharge Medications   Current Discharge Medication List      CONTINUE these medications which have CHANGED    Details   metFORMIN (GLUCOPHAGE-XR) 500 MG 24 hr tablet Take 1 tablet (500 mg) by mouth 2 times daily (with meals) Until follow-up with primary doctor and lab recheck  Qty: 360 tablet, Refills: 0     Associated Diagnoses: Type 2 diabetes mellitus with other specified complication, without long-term current use of insulin (H)         CONTINUE these medications which have NOT CHANGED    Details   aspirin (ASPIRIN LOW DOSE) 81 MG tablet Take 1 tablet (81 mg) by mouth daily  Qty: 30 tablet, Refills: 3    Associated Diagnoses: Hyperlipidemia LDL goal <100; Hypertension goal BP (blood pressure) < 140/90      atorvastatin (LIPITOR) 10 MG tablet TAKE 1 TABLET(10 MG) BY MOUTH DAILY  Qty: 90 tablet, Refills: 1    Associated Diagnoses: Hyperlipidemia LDL goal <100      divalproex (DEPAKOTE) 500 MG EC tablet Take 2 tablets (1,000 mg) by mouth daily  Qty: 10 tablet, Refills: 0    Associated Diagnoses: Hyperlipidemia LDL goal <100      glipiZIDE (GLUCOTROL XL) 2.5 MG 24 hr tablet Take 1 tablet (2.5 mg) by mouth daily  Qty: 30 tablet, Refills: 0      lurasidone (LATUDA) 40 MG TABS tablet Take 40 mg by mouth At Bedtime      modafinil (PROVIGIL) 200 MG tablet Take 200 mg by mouth every morning       Multiple Vitamins-Minerals (SENIOR MULTIVITAMIN PLUS PO) Take 1 tablet by mouth daily      NONFORMULARY by Intrathecal route continuous - + up to 3 boluses daily    Managed by Dr Pawan Shaw, [facility]     Medications in Pump:  fentanyl 2000mcg/mL  Bupivacaine 20mg/mL  Morphine 5.8mg/mL     Rate:   Fentanyl 900mcg/day  Bupivacaine 9mg/day  Morphine 2.6mg/day  Pump Last Fill Date:  4/16/2020      pregabalin (LYRICA) 100 MG capsule Take 1 capsule (100 mg) by mouth 4 times daily  Qty: 360 capsule, Refills: 3    Associated Diagnoses: Peripheral polyneuropathy      sennosides (SENOKOT) 8.6 MG tablet Take one tablet in the morning and 2 tablets in the evening.      tamsulosin (FLOMAX) 0.4 MG capsule Take 0.8 mg by mouth daily   Refills: 3      vitamin C (ASCORBIC ACID) 1000 MG TABS Take 1,000 mg by mouth daily      blood glucose (ACCU-CHEK COMPACT DRUM) test strip Use to test blood sugars 3 to 4 times daily.  Qty: 400 strip,  Refills: 1    Associated Diagnoses: Type 2 diabetes mellitus without complication, without long-term current use of insulin (H)      blood glucose (ACCU-CHEK MULTICLIX) lancing device Lancing device to be used with lancets.  Qty: 1 each, Refills: 0    Comments: Okay to subsitute.  Associated Diagnoses: Type 2 diabetes mellitus without complication, without long-term current use of insulin (H)      blood glucose monitoring (SOFTCLIX) lancets USE TO TEST BLOOD SUGAR 3-4 TIMES DAILY OR AS DIRECTED.  Refills: 1         STOP taking these medications       blood glucose monitoring (ACCU-CHEK COMPACT CARE KIT) meter device kit Comments:   Reason for Stopping:         hydrochlorothiazide (HYDRODIURIL) 12.5 MG tablet Comments:   Reason for Stopping:         lisinopril (PRINIVIL/ZESTRIL) 20 MG tablet Comments:   Reason for Stopping:             Allergies   Allergies   Allergen Reactions     Cephalexin Diarrhea     Liraglutide Other (See Comments)     Sulfa Drugs Swelling     Pt has taken  Taken sulfa drugs orally without trouble. He had problems with Sulfa eye drops. Eye swelled up     Victoza      Increased migraine frequency and severity

## 2020-04-18 NOTE — ED NOTES
[facility]  ED Nurse Handoff Report    ED Chief complaint: Dizziness and Hypotension      ED Diagnosis:   Final diagnoses:   Hypotension, unspecified hypotension type   Acute kidney injury (H)   Dizziness       Code Status: Full Code    Allergies:   Allergies   Allergen Reactions     Cephalexin Diarrhea     Liraglutide Other (See Comments)     Sulfa Drugs Swelling     Pt has taken  Taken sulfa drugs orally without trouble. He had problems with Sulfa eye drops. Eye swelled up     Victoza      Increased migraine frequency and severity       Patient Story: Pt presents to the ED via EMS after he began to feel dizzy and weak at home this afternoon. EMS found patient to be hypotensive upon arrival.  Focused Assessment:  A&O x4. Denies CP or SOB. Lungs clear. Reports dizziness and generalized weakness. Initial BP's 80's systolic. Improved after 2L NS to 120's systolic.    Treatments and/or interventions provided: 1L NS bolus x2  Patient's response to treatments and/or interventions: BP improved after fluids    To be done/followed up on inpatient unit:  n/a    Does this patient have any cognitive concerns?: none    Activity level - Baseline/Home:  Independent  Activity Level - Current:   Independent    Patient's Preferred language: English   Needed?: No    Isolation: None  Infection: Not Applicable  Bariatric?: No    Vital Signs:   Vitals:    04/17/20 2030 04/17/20 2050 04/17/20 2100   BP: 96/56 118/75 117/68   Pulse: 83  82       Cardiac Rhythm:Cardiac Rhythm: Normal sinus rhythm    Was the PSS-3 completed:   Yes  What interventions are required if any?               Family Comments: n/a  OBS brochure/video discussed/provided to patient/family: Yes              Name of person given brochure if not patient: n/a              Relationship to patient: n/a    For the majority of the shift this patient's behavior was Green.   Behavioral interventions performed were n/a.    ED NURSE PHONE NUMBER:  *[phone]

## 2020-04-19 ENCOUNTER — APPOINTMENT (OUTPATIENT)
Dept: GENERAL RADIOLOGY | Facility: CLINIC | Age: 65
DRG: 683 | End: 2020-04-19
Attending: EMERGENCY MEDICINE
Payer: COMMERCIAL

## 2020-04-19 ENCOUNTER — NURSE TRIAGE (OUTPATIENT)
Dept: NURSING | Facility: CLINIC | Age: 65
End: 2020-04-19

## 2020-04-19 ENCOUNTER — HOSPITAL ENCOUNTER (INPATIENT)
Facility: CLINIC | Age: 65
LOS: 3 days | Discharge: HOME | DRG: 683 | End: 2020-04-23
Attending: EMERGENCY MEDICINE | Admitting: HOSPITALIST
Payer: COMMERCIAL

## 2020-04-19 DIAGNOSIS — I21.4 NSTEMI (NON-ST ELEVATED MYOCARDIAL INFARCTION) (H): ICD-10-CM

## 2020-04-19 DIAGNOSIS — R10.9 FLANK PAIN: ICD-10-CM

## 2020-04-19 DIAGNOSIS — R79.89 ELEVATED TROPONIN: ICD-10-CM

## 2020-04-19 DIAGNOSIS — R11.0 NAUSEA: ICD-10-CM

## 2020-04-19 LAB
ALBUMIN SERPL-MCNC: 2.7 G/DL (ref 3.4–5)
ALP SERPL-CCNC: 68 U/L (ref 40–150)
ALT SERPL W P-5'-P-CCNC: 29 U/L (ref 0–70)
ANION GAP SERPL CALCULATED.3IONS-SCNC: 7 MMOL/L (ref 3–14)
AST SERPL W P-5'-P-CCNC: 16 U/L (ref 0–45)
BASOPHILS # BLD AUTO: 0 10E9/L (ref 0–0.2)
BASOPHILS NFR BLD AUTO: 0.3 %
BILIRUB SERPL-MCNC: 0.5 MG/DL (ref 0.2–1.3)
BUN SERPL-MCNC: 11 MG/DL (ref 7–30)
CALCIUM SERPL-MCNC: 8.3 MG/DL (ref 8.5–10.1)
CHLORIDE SERPL-SCNC: 108 MMOL/L (ref 94–109)
CO2 SERPL-SCNC: 24 MMOL/L (ref 20–32)
CREAT SERPL-MCNC: 0.96 MG/DL (ref 0.66–1.25)
DIFFERENTIAL METHOD BLD: ABNORMAL
EOSINOPHIL # BLD AUTO: 0.2 10E9/L (ref 0–0.7)
EOSINOPHIL NFR BLD AUTO: 2.7 %
ERYTHROCYTE [DISTWIDTH] IN BLOOD BY AUTOMATED COUNT: 14.4 % (ref 10–15)
GFR SERPL CREATININE-BSD FRML MDRD: 83 ML/MIN/{1.73_M2}
GLUCOSE SERPL-MCNC: 145 MG/DL (ref 70–99)
HCT VFR BLD AUTO: 31.5 % (ref 40–53)
HGB BLD-MCNC: 11.1 G/DL (ref 13.3–17.7)
IMM GRANULOCYTES # BLD: 0 10E9/L (ref 0–0.4)
IMM GRANULOCYTES NFR BLD: 0.1 %
INTERPRETATION ECG - MUSE: NORMAL
LIPASE SERPL-CCNC: 110 U/L (ref 73–393)
LYMPHOCYTES # BLD AUTO: 0.8 10E9/L (ref 0.8–5.3)
LYMPHOCYTES NFR BLD AUTO: 12.2 %
MCH RBC QN AUTO: 34.4 PG (ref 26.5–33)
MCHC RBC AUTO-ENTMCNC: 35.2 G/DL (ref 31.5–36.5)
MCV RBC AUTO: 98 FL (ref 78–100)
MONOCYTES # BLD AUTO: 0.5 10E9/L (ref 0–1.3)
MONOCYTES NFR BLD AUTO: 6.8 %
NEUTROPHILS # BLD AUTO: 5.2 10E9/L (ref 1.6–8.3)
NEUTROPHILS NFR BLD AUTO: 77.9 %
NRBC # BLD AUTO: 0 10*3/UL
NRBC BLD AUTO-RTO: 0 /100
NT-PROBNP SERPL-MCNC: 1501 PG/ML (ref 0–900)
PLATELET # BLD AUTO: 109 10E9/L (ref 150–450)
POTASSIUM SERPL-SCNC: 3.7 MMOL/L (ref 3.4–5.3)
PROT SERPL-MCNC: 5.7 G/DL (ref 6.8–8.8)
RBC # BLD AUTO: 3.23 10E12/L (ref 4.4–5.9)
SODIUM SERPL-SCNC: 139 MMOL/L (ref 133–144)
TROPONIN I SERPL-MCNC: 0.09 UG/L (ref 0–0.04)
WBC # BLD AUTO: 6.7 10E9/L (ref 4–11)

## 2020-04-19 PROCEDURE — 84484 ASSAY OF TROPONIN QUANT: CPT | Performed by: EMERGENCY MEDICINE

## 2020-04-19 PROCEDURE — 80053 COMPREHEN METABOLIC PANEL: CPT | Performed by: EMERGENCY MEDICINE

## 2020-04-19 PROCEDURE — 83880 ASSAY OF NATRIURETIC PEPTIDE: CPT | Performed by: EMERGENCY MEDICINE

## 2020-04-19 PROCEDURE — 25000132 ZZH RX MED GY IP 250 OP 250 PS 637: Performed by: EMERGENCY MEDICINE

## 2020-04-19 PROCEDURE — 71046 X-RAY EXAM CHEST 2 VIEWS: CPT

## 2020-04-19 PROCEDURE — 85025 COMPLETE CBC W/AUTO DIFF WBC: CPT | Performed by: EMERGENCY MEDICINE

## 2020-04-19 PROCEDURE — 96375 TX/PRO/DX INJ NEW DRUG ADDON: CPT

## 2020-04-19 PROCEDURE — 25000128 H RX IP 250 OP 636: Performed by: EMERGENCY MEDICINE

## 2020-04-19 PROCEDURE — 99285 EMERGENCY DEPT VISIT HI MDM: CPT | Mod: 25

## 2020-04-19 PROCEDURE — 83690 ASSAY OF LIPASE: CPT | Performed by: EMERGENCY MEDICINE

## 2020-04-19 PROCEDURE — 93005 ELECTROCARDIOGRAM TRACING: CPT

## 2020-04-19 RX ORDER — HEPARIN SODIUM 10000 [USP'U]/100ML
1350 INJECTION, SOLUTION INTRAVENOUS CONTINUOUS
Status: DISCONTINUED | OUTPATIENT
Start: 2020-04-19 | End: 2020-04-20

## 2020-04-19 RX ORDER — ASPIRIN 81 MG/1
162 TABLET, CHEWABLE ORAL ONCE
Status: COMPLETED | OUTPATIENT
Start: 2020-04-19 | End: 2020-04-19

## 2020-04-19 RX ORDER — ONDANSETRON 2 MG/ML
4 INJECTION INTRAMUSCULAR; INTRAVENOUS ONCE
Status: COMPLETED | OUTPATIENT
Start: 2020-04-19 | End: 2020-04-19

## 2020-04-19 RX ADMIN — ONDANSETRON 4 MG: 2 INJECTION INTRAMUSCULAR; INTRAVENOUS at 23:39

## 2020-04-19 RX ADMIN — ASPIRIN 81 MG 162 MG: 81 TABLET ORAL at 23:34

## 2020-04-19 ASSESSMENT — MIFFLIN-ST. JEOR: SCORE: 1842.26

## 2020-04-20 ENCOUNTER — APPOINTMENT (OUTPATIENT)
Dept: ULTRASOUND IMAGING | Facility: CLINIC | Age: 65
DRG: 683 | End: 2020-04-20
Attending: HOSPITALIST
Payer: COMMERCIAL

## 2020-04-20 ENCOUNTER — TELEPHONE (OUTPATIENT)
Dept: FAMILY MEDICINE | Facility: CLINIC | Age: 65
End: 2020-04-20

## 2020-04-20 ENCOUNTER — APPOINTMENT (OUTPATIENT)
Dept: CARDIOLOGY | Facility: CLINIC | Age: 65
DRG: 683 | End: 2020-04-20
Attending: HOSPITALIST
Payer: COMMERCIAL

## 2020-04-20 PROBLEM — R94.31 EKG ABNORMALITY: Status: ACTIVE | Noted: 2020-04-20

## 2020-04-20 LAB
ANION GAP SERPL CALCULATED.3IONS-SCNC: 7 MMOL/L (ref 3–14)
BUN SERPL-MCNC: 14 MG/DL (ref 7–30)
CALCIUM SERPL-MCNC: 8.2 MG/DL (ref 8.5–10.1)
CHLORIDE SERPL-SCNC: 110 MMOL/L (ref 94–109)
CO2 SERPL-SCNC: 22 MMOL/L (ref 20–32)
CREAT SERPL-MCNC: 0.96 MG/DL (ref 0.66–1.25)
ERYTHROCYTE [DISTWIDTH] IN BLOOD BY AUTOMATED COUNT: 14.5 % (ref 10–15)
GFR SERPL CREATININE-BSD FRML MDRD: 84 ML/MIN/{1.73_M2}
GLUCOSE BLDC GLUCOMTR-MCNC: 114 MG/DL (ref 70–99)
GLUCOSE BLDC GLUCOMTR-MCNC: 133 MG/DL (ref 70–99)
GLUCOSE BLDC GLUCOMTR-MCNC: 144 MG/DL (ref 70–99)
GLUCOSE BLDC GLUCOMTR-MCNC: 83 MG/DL (ref 70–99)
GLUCOSE BLDC GLUCOMTR-MCNC: 94 MG/DL (ref 70–99)
GLUCOSE SERPL-MCNC: 86 MG/DL (ref 70–99)
HCT VFR BLD AUTO: 33.1 % (ref 40–53)
HGB BLD-MCNC: 11.1 G/DL (ref 13.3–17.7)
LMWH PPP CHRO-ACNC: <0.1 IU/ML
MCH RBC QN AUTO: 33.6 PG (ref 26.5–33)
MCHC RBC AUTO-ENTMCNC: 33.5 G/DL (ref 31.5–36.5)
MCV RBC AUTO: 100 FL (ref 78–100)
PLATELET # BLD AUTO: 121 10E9/L (ref 150–450)
POTASSIUM SERPL-SCNC: 3.9 MMOL/L (ref 3.4–5.3)
RBC # BLD AUTO: 3.3 10E12/L (ref 4.4–5.9)
SODIUM SERPL-SCNC: 139 MMOL/L (ref 133–144)
TROPONIN I SERPL-MCNC: 0.07 UG/L (ref 0–0.04)
TROPONIN I SERPL-MCNC: 0.07 UG/L (ref 0–0.04)
TROPONIN I SERPL-MCNC: 0.08 UG/L (ref 0–0.04)
TSH SERPL DL<=0.005 MIU/L-ACNC: 1.74 MU/L (ref 0.4–4)
WBC # BLD AUTO: 4.5 10E9/L (ref 4–11)

## 2020-04-20 PROCEDURE — 21000001 ZZH R&B HEART CARE

## 2020-04-20 PROCEDURE — 85520 HEPARIN ASSAY: CPT | Performed by: HOSPITALIST

## 2020-04-20 PROCEDURE — 25500064 ZZH RX 255 OP 636: Performed by: HOSPITALIST

## 2020-04-20 PROCEDURE — 84484 ASSAY OF TROPONIN QUANT: CPT | Performed by: HOSPITALIST

## 2020-04-20 PROCEDURE — 36415 COLL VENOUS BLD VENIPUNCTURE: CPT | Performed by: HOSPITALIST

## 2020-04-20 PROCEDURE — 25000128 H RX IP 250 OP 636: Performed by: HOSPITALIST

## 2020-04-20 PROCEDURE — 99223 1ST HOSP IP/OBS HIGH 75: CPT | Mod: AI | Performed by: HOSPITALIST

## 2020-04-20 PROCEDURE — 80048 BASIC METABOLIC PNL TOTAL CA: CPT | Performed by: HOSPITALIST

## 2020-04-20 PROCEDURE — 99222 1ST HOSP IP/OBS MODERATE 55: CPT | Mod: 25 | Performed by: INTERNAL MEDICINE

## 2020-04-20 PROCEDURE — 25000132 ZZH RX MED GY IP 250 OP 250 PS 637: Performed by: HOSPITALIST

## 2020-04-20 PROCEDURE — 96376 TX/PRO/DX INJ SAME DRUG ADON: CPT

## 2020-04-20 PROCEDURE — 25000128 H RX IP 250 OP 636: Performed by: EMERGENCY MEDICINE

## 2020-04-20 PROCEDURE — 00000146 ZZHCL STATISTIC GLUCOSE BY METER IP

## 2020-04-20 PROCEDURE — 96365 THER/PROPH/DIAG IV INF INIT: CPT

## 2020-04-20 PROCEDURE — 76770 US EXAM ABDO BACK WALL COMP: CPT

## 2020-04-20 PROCEDURE — 81001 URINALYSIS AUTO W/SCOPE: CPT | Performed by: HOSPITALIST

## 2020-04-20 PROCEDURE — 85027 COMPLETE CBC AUTOMATED: CPT | Performed by: HOSPITALIST

## 2020-04-20 PROCEDURE — 84443 ASSAY THYROID STIM HORMONE: CPT | Performed by: HOSPITALIST

## 2020-04-20 PROCEDURE — 93306 TTE W/DOPPLER COMPLETE: CPT

## 2020-04-20 PROCEDURE — 93306 TTE W/DOPPLER COMPLETE: CPT | Mod: 26 | Performed by: INTERNAL MEDICINE

## 2020-04-20 RX ORDER — NALOXONE HYDROCHLORIDE 0.4 MG/ML
.1-.4 INJECTION, SOLUTION INTRAMUSCULAR; INTRAVENOUS; SUBCUTANEOUS
Status: DISCONTINUED | OUTPATIENT
Start: 2020-04-20 | End: 2020-04-23 | Stop reason: HOSPADM

## 2020-04-20 RX ORDER — ONDANSETRON 2 MG/ML
4 INJECTION INTRAMUSCULAR; INTRAVENOUS EVERY 6 HOURS PRN
Status: DISCONTINUED | OUTPATIENT
Start: 2020-04-20 | End: 2020-04-22

## 2020-04-20 RX ORDER — PROCHLORPERAZINE MALEATE 5 MG
10 TABLET ORAL EVERY 6 HOURS PRN
Status: DISCONTINUED | OUTPATIENT
Start: 2020-04-20 | End: 2020-04-23 | Stop reason: HOSPADM

## 2020-04-20 RX ORDER — NICOTINE POLACRILEX 4 MG
15-30 LOZENGE BUCCAL
Status: DISCONTINUED | OUTPATIENT
Start: 2020-04-20 | End: 2020-04-20

## 2020-04-20 RX ORDER — POLYETHYLENE GLYCOL 3350 17 G/17G
17 POWDER, FOR SOLUTION ORAL DAILY PRN
Status: DISCONTINUED | OUTPATIENT
Start: 2020-04-20 | End: 2020-04-23 | Stop reason: HOSPADM

## 2020-04-20 RX ORDER — ONDANSETRON 4 MG/1
4 TABLET, ORALLY DISINTEGRATING ORAL EVERY 6 HOURS PRN
Status: DISCONTINUED | OUTPATIENT
Start: 2020-04-20 | End: 2020-04-22

## 2020-04-20 RX ORDER — PREGABALIN 100 MG/1
100 CAPSULE ORAL 4 TIMES DAILY
Status: DISCONTINUED | OUTPATIENT
Start: 2020-04-20 | End: 2020-04-20

## 2020-04-20 RX ORDER — PREGABALIN 75 MG/1
75 CAPSULE ORAL 4 TIMES DAILY
Status: DISCONTINUED | OUTPATIENT
Start: 2020-04-20 | End: 2020-04-21

## 2020-04-20 RX ORDER — IBUPROFEN 200 MG
200 TABLET ORAL EVERY 6 HOURS PRN
Status: DISCONTINUED | OUTPATIENT
Start: 2020-04-20 | End: 2020-04-23 | Stop reason: HOSPADM

## 2020-04-20 RX ORDER — SODIUM CHLORIDE AND POTASSIUM CHLORIDE 150; 900 MG/100ML; MG/100ML
INJECTION, SOLUTION INTRAVENOUS CONTINUOUS
Status: DISCONTINUED | OUTPATIENT
Start: 2020-04-20 | End: 2020-04-20

## 2020-04-20 RX ORDER — SENNOSIDES 8.6 MG
1-2 TABLET ORAL 2 TIMES DAILY
Status: DISCONTINUED | OUTPATIENT
Start: 2020-04-20 | End: 2020-04-23 | Stop reason: HOSPADM

## 2020-04-20 RX ORDER — DEXTROSE MONOHYDRATE 25 G/50ML
25-50 INJECTION, SOLUTION INTRAVENOUS
Status: DISCONTINUED | OUTPATIENT
Start: 2020-04-20 | End: 2020-04-20

## 2020-04-20 RX ORDER — PROCHLORPERAZINE 25 MG
25 SUPPOSITORY, RECTAL RECTAL EVERY 12 HOURS PRN
Status: DISCONTINUED | OUTPATIENT
Start: 2020-04-20 | End: 2020-04-23 | Stop reason: HOSPADM

## 2020-04-20 RX ORDER — ALUMINA, MAGNESIA, AND SIMETHICONE 2400; 2400; 240 MG/30ML; MG/30ML; MG/30ML
30 SUSPENSION ORAL EVERY 4 HOURS PRN
Status: DISCONTINUED | OUTPATIENT
Start: 2020-04-20 | End: 2020-04-23 | Stop reason: HOSPADM

## 2020-04-20 RX ORDER — NICOTINE POLACRILEX 4 MG
15-30 LOZENGE BUCCAL
Status: DISCONTINUED | OUTPATIENT
Start: 2020-04-20 | End: 2020-04-23 | Stop reason: HOSPADM

## 2020-04-20 RX ORDER — NITROGLYCERIN 0.4 MG/1
0.4 TABLET SUBLINGUAL EVERY 5 MIN PRN
Status: DISCONTINUED | OUTPATIENT
Start: 2020-04-20 | End: 2020-04-23 | Stop reason: HOSPADM

## 2020-04-20 RX ORDER — ASPIRIN 81 MG/1
81 TABLET ORAL DAILY
Status: DISCONTINUED | OUTPATIENT
Start: 2020-04-20 | End: 2020-04-23 | Stop reason: HOSPADM

## 2020-04-20 RX ORDER — DIVALPROEX SODIUM 500 MG/1
1000 TABLET, DELAYED RELEASE ORAL DAILY
Status: DISCONTINUED | OUTPATIENT
Start: 2020-04-20 | End: 2020-04-23 | Stop reason: HOSPADM

## 2020-04-20 RX ORDER — ACETAMINOPHEN 650 MG/1
650 SUPPOSITORY RECTAL EVERY 4 HOURS PRN
Status: DISCONTINUED | OUTPATIENT
Start: 2020-04-20 | End: 2020-04-23 | Stop reason: HOSPADM

## 2020-04-20 RX ORDER — DEXTROSE MONOHYDRATE 25 G/50ML
25-50 INJECTION, SOLUTION INTRAVENOUS
Status: DISCONTINUED | OUTPATIENT
Start: 2020-04-20 | End: 2020-04-23 | Stop reason: HOSPADM

## 2020-04-20 RX ORDER — LIDOCAINE 40 MG/G
CREAM TOPICAL
Status: DISCONTINUED | OUTPATIENT
Start: 2020-04-20 | End: 2020-04-23 | Stop reason: HOSPADM

## 2020-04-20 RX ORDER — ATORVASTATIN CALCIUM 10 MG/1
10 TABLET, FILM COATED ORAL EVERY EVENING
Status: DISCONTINUED | OUTPATIENT
Start: 2020-04-20 | End: 2020-04-23 | Stop reason: HOSPADM

## 2020-04-20 RX ORDER — TAMSULOSIN HYDROCHLORIDE 0.4 MG/1
0.8 CAPSULE ORAL DAILY
Status: DISCONTINUED | OUTPATIENT
Start: 2020-04-20 | End: 2020-04-23 | Stop reason: HOSPADM

## 2020-04-20 RX ORDER — ACETAMINOPHEN 325 MG/1
650 TABLET ORAL EVERY 4 HOURS PRN
Status: DISCONTINUED | OUTPATIENT
Start: 2020-04-20 | End: 2020-04-23 | Stop reason: HOSPADM

## 2020-04-20 RX ORDER — LURASIDONE HYDROCHLORIDE 40 MG/1
40 TABLET, FILM COATED ORAL AT BEDTIME
Status: DISCONTINUED | OUTPATIENT
Start: 2020-04-20 | End: 2020-04-23 | Stop reason: HOSPADM

## 2020-04-20 RX ADMIN — ACETAMINOPHEN 650 MG: 325 TABLET, FILM COATED ORAL at 16:42

## 2020-04-20 RX ADMIN — DIVALPROEX SODIUM 1000 MG: 500 TABLET, DELAYED RELEASE ORAL at 10:04

## 2020-04-20 RX ADMIN — IBUPROFEN 200 MG: 200 TABLET, FILM COATED ORAL at 14:59

## 2020-04-20 RX ADMIN — PREGABALIN 75 MG: 75 CAPSULE ORAL at 13:26

## 2020-04-20 RX ADMIN — PROCHLORPERAZINE MALEATE 10 MG: 5 TABLET ORAL at 23:19

## 2020-04-20 RX ADMIN — PREGABALIN 75 MG: 75 CAPSULE ORAL at 20:53

## 2020-04-20 RX ADMIN — PROCHLORPERAZINE MALEATE 10 MG: 5 TABLET ORAL at 10:58

## 2020-04-20 RX ADMIN — ATORVASTATIN CALCIUM 10 MG: 10 TABLET, FILM COATED ORAL at 20:54

## 2020-04-20 RX ADMIN — ACETAMINOPHEN 650 MG: 325 TABLET, FILM COATED ORAL at 03:05

## 2020-04-20 RX ADMIN — PREGABALIN 75 MG: 75 CAPSULE ORAL at 18:32

## 2020-04-20 RX ADMIN — ACETAMINOPHEN 650 MG: 325 TABLET, FILM COATED ORAL at 11:48

## 2020-04-20 RX ADMIN — TAMSULOSIN HYDROCHLORIDE 0.8 MG: 0.4 CAPSULE ORAL at 10:04

## 2020-04-20 RX ADMIN — Medication 5000 UNITS: at 00:11

## 2020-04-20 RX ADMIN — PREGABALIN 75 MG: 75 CAPSULE ORAL at 10:04

## 2020-04-20 RX ADMIN — PROCHLORPERAZINE EDISYLATE 10 MG: 5 INJECTION INTRAMUSCULAR; INTRAVENOUS at 02:23

## 2020-04-20 RX ADMIN — HUMAN ALBUMIN MICROSPHERES AND PERFLUTREN 5 ML: 10; .22 INJECTION, SOLUTION INTRAVENOUS at 11:36

## 2020-04-20 RX ADMIN — PROCHLORPERAZINE MALEATE 10 MG: 5 TABLET ORAL at 17:15

## 2020-04-20 RX ADMIN — POTASSIUM CHLORIDE AND SODIUM CHLORIDE: 900; 150 INJECTION, SOLUTION INTRAVENOUS at 03:01

## 2020-04-20 RX ADMIN — HEPARIN SODIUM 1000 UNITS/HR: 10000 INJECTION, SOLUTION INTRAVENOUS at 00:08

## 2020-04-20 RX ADMIN — SENNOSIDES 1 TABLET: 8.6 TABLET, FILM COATED ORAL at 10:04

## 2020-04-20 RX ADMIN — MICONAZOLE NITRATE: 20 POWDER TOPICAL at 18:35

## 2020-04-20 RX ADMIN — ACETAMINOPHEN 650 MG: 325 TABLET, FILM COATED ORAL at 20:52

## 2020-04-20 RX ADMIN — ONDANSETRON 4 MG: 4 TABLET, ORALLY DISINTEGRATING ORAL at 20:53

## 2020-04-20 RX ADMIN — ASPIRIN 81 MG: 81 TABLET, DELAYED RELEASE ORAL at 10:04

## 2020-04-20 RX ADMIN — LURASIDONE HYDROCHLORIDE 40 MG: 40 TABLET, FILM COATED ORAL at 20:53

## 2020-04-20 ASSESSMENT — MIFFLIN-ST. JEOR: SCORE: 1842.26

## 2020-04-20 ASSESSMENT — ACTIVITIES OF DAILY LIVING (ADL)
ADLS_ACUITY_SCORE: 16
ADLS_ACUITY_SCORE: 16
ADLS_ACUITY_SCORE: 15
ADLS_ACUITY_SCORE: 15
ADLS_ACUITY_SCORE: 13

## 2020-04-20 NOTE — H&P
[facility]    History and Physical  Hospitalist       Date of Admission:  4/19/2020    Assessment & Plan   Parmjit Hernández is a 64 year old male who presents with fatigue, flank pain, weakness, nausea. Found to have mildly elevated troponin and EKG changes suspicious for NSTEMI    Nausea  Anterolateral T wave changes  Elevated troponin, concerning for NSTEMI  After discharge 4/18, patient felt more weak, fatigued. Had episode of diaphoresis along with persistent nausea prompting visit to ED. Trop 0.086 on admit. Known hx of detectable-mildly elevated trop (0.02-0.06, with max noted 0.154 in Jan 2018). EKG with anterolateral t wave flattening. Last TTE July 2019 at [place]: EF 59%, grade 1 diastolic dysfunction, no other abnormalities noted. Hx father and mother both with CAD. Received aspirin in ED and was starting on heparin gtt.  - Admit inpatient  - telemetry monitoring  - Continue heparin gtt  - Trend trop q4h x3  - echocardiogram  - cardiology consult  - EKG if chest pain  - nitroglycerin available if chest pain  - Continue PTA 81mg ASA daily  - Continue PTA statin  - NPO for now in case of further cardiac work-up  - IVF x1L overnight while NPO    Right sided flank discomfort  Reproducible on palpation, worsened with twisting of torso  - Likely musculoskeletal  - PRN tylenol and heating pads available    Recent admission 4/17 for Dizziness, Hypotension  Acute kidney injury and lactic acidosis likely secondary to hypotensive insult  Cr baseline near 1-1.2, gordon to 2.09 prompting 4/17 admission. Reported dizziness at home, took extra dose of lisinopril resulting in hypotension and subsequent KWABENA, lactic acidosis due to hypovolemia and hypotensive insult. Resolved quickly with IVF.   - Instructed to hold his lisinopril and hydrochlorothiazide on discharge until follow up with PCP (he has been compliant with this)     Diarrhea presumed from opioid withdrawal - resolved  COVID negative 4/15  Reported  10-day history of profuse watery diarrhea, 12-15 times per day, no blood or mucus noted which prompted his admission 4/15. Thought that his diarrhea was secondary to opioid withdrawal as his intrathecal pump needed refill     Chronic abdominal pain  Narcotic dependence  Chronic abdominal pain, has an intrathecal pump which was filled during recent admit 4/15-4/17.      Questionable splenic infarct versus hemangioma on CT scan  Lesion was also seen on multiple CT scans in 2018 and was not seen in the most recent CT so most likely hemangioma  -Heme-onc consultation during 4/15 admit, agreed most likely hemangioma and recommended follow up imaging (abdominal US) in 6mo.      Other cirrhosis of liver, possibly from vences   New information for the patient during 4/15-4/17 admit  - Follow up with [facility] as outpatient     Hyperlipidemia LDL goal <100  Lipid profile checked 2/24-- with total cholesterol 163, HDL 32 and .  - PTA atorvastatin 10mg, continue     Hypertension goal BP (blood pressure) < 140/90  PTA lisinopril 20mg daily and hydrochlorothiazide 25mg daily  - Continue to hold these medications.     Bipolar I disorder  PTA depakote 1000mg daily. Level 4/18 was 31.   - Continue PTA depakote     Other amyloidosis  He is followed by hematology Dr. Romeo status post peripheral stem cell transplant     Type 2 diabetes mellitus without complication, without long-term current use of insulin  PTA glipizide, metformin-- on hold  - moderate resistance sliding scale insulin  - NPO for now, would need mod carb diet upon resumption of diet.     Chronic anemia  Appears to be close to baseline with hgb 11.1 on admission, as baseline near 11.    Restless legs syndrome (RLS)  Morbid obesity    DVT Prophylaxis: heparin gtt  Code Status: Full Code  Expected discharge: 24-72 hours, recommended to prior living arrangement once cardiac work-up completed    Trudy Sandoval DO    Primary Care Physician   Vince Henry    Chief  Complaint   Flank pain, nausea, fatigue    History is obtained from the patient    History of Present Illness   Parmjit Hernández is a 64 year old male who presents with right sided flank pain, nausea, fatigue, weakness that ramped up after discharge on 4/18. He felt more fatigued/weak and spent a lot of time in bed since discharge. Noted some flank pain that he was worried was his kidneys. Then had persistent low grade nausea. He was able to eat some even with the nausea. Also had an episode of diaphoresis on 4/18. He reports bowel movements are back on schedule now that he is on his senna again.  Denies chest pain, shortness of breath, fevers, chills, abdominal pain worse than baseline, changes in urination.  He reports compliance with stopping his lisinopril/hctz since discharge.    Past Medical History    I have reviewed this patient's medical history and updated it with pertinent information if needed.   Past Medical History:   Diagnosis Date     Allergic state      Amyloidosis (H)      Diabetes mellitus (H)     type 2     H/O bone marrow transplant (H)      Headache(784.0)      Hypertension 2007     Myalgia and myositis, unspecified      Obsessive-compulsive disorders      OCD (obsessive compulsive disorder)      Other acquired absence of organ 94     Other specified viral warts      Pain in the abdomen      Pure hypercholesterolemia      Sleep apnea        Past Surgical History   I have reviewed this patient's surgical history and updated it with pertinent information if needed.  Past Surgical History:   Procedure Laterality Date     BMT PROTOCOL      for systemic amyloidosis     BONE MARROW BIOPSY, BONE SPECIMEN, NEEDLE/TROCAR N/A 6/8/2016    Procedure: BIOPSY BONE MARROW;  Surgeon: Nathan Agrawal MD;  Location:  GI     BONE MARROW BIOPSY, BONE SPECIMEN, NEEDLE/TROCAR N/A 2/20/2019    Procedure: BIOPSY BONE MARROW;  Surgeon: Michael Raygoza MD;  Location:  GI     C NONSPECIFIC PROCEDURE   94    Cholecystectomy     C NONSPECIFIC PROCEDURE  2000    repair deviated septum     CHOLECYSTECTOMY  1995    lap qian     COLONOSCOPY  2012    hx polyps     ESOPHAGOSCOPY, GASTROSCOPY, DUODENOSCOPY (EGD), COMBINED N/A 5/29/2015    Procedure: COMBINED ESOPHAGOSCOPY, GASTROSCOPY, DUODENOSCOPY (EGD), BIOPSY SINGLE OR MULTIPLE;  Surgeon: John Jacob MD;  Location:  GI     HERNIA REPAIR, UMBILICAL  2006       Prior to Admission Medications   Prior to Admission Medications   Prescriptions Last Dose Informant Patient Reported? Taking?   Multiple Vitamins-Minerals (SENIOR MULTIVITAMIN PLUS PO)  Self Yes No   Sig: Take 1 tablet by mouth daily   NONFORMULARY  Self Yes No   Sig: by Intrathecal route continuous - + up to 3 boluses daily    Managed by [facility]     Medications in Pump:  fentanyl 2000mcg/mL  Bupivacaine 20mg/mL  Morphine 5.8mg/mL     Rate:   Fentanyl 900mcg/day  Bupivacaine 9mg/day  Morphine 2.6mg/day  Pump Last Fill Date:  4/16/2020   aspirin (ASPIRIN LOW DOSE) 81 MG tablet  Self No No   Sig: Take 1 tablet (81 mg) by mouth daily   atorvastatin (LIPITOR) 10 MG tablet  Self No No   Sig: TAKE 1 TABLET(10 MG) BY MOUTH DAILY   blood glucose (ACCU-CHEK COMPACT DRUM) test strip  Self No No   Sig: Use to test blood sugars 3 to 4 times daily.   blood glucose (ACCU-CHEK MULTICLIX) lancing device  Self No No   Sig: Lancing device to be used with lancets.   blood glucose monitoring (ACCU-CHEK COMPACT CARE KIT) meter device kit  Self No No   Sig: Use to test blood sugars 3 to 4  times daily.   blood glucose monitoring (SOFTCLIX) lancets  Self Yes No   Sig: USE TO TEST BLOOD SUGAR 3-4 TIMES DAILY OR AS DIRECTED.   divalproex (DEPAKOTE) 500 MG EC tablet  Self No No   Sig: Take 2 tablets (1,000 mg) by mouth daily   glipiZIDE (GLUCOTROL XL) 2.5 MG 24 hr tablet  Self No No   Sig: Take 1 tablet (2.5 mg) by mouth daily   lurasidone (LATUDA) 40 MG TABS tablet  Self Yes No   Sig: Take 40 mg by  mouth At Bedtime   metFORMIN (GLUCOPHAGE-XR) 500 MG 24 hr tablet   No No   Sig: Take 1 tablet (500 mg) by mouth 2 times daily (with meals) Until follow-up with primary doctor and lab recheck   modafinil (PROVIGIL) 200 MG tablet  Self Yes No   Sig: Take 200 mg by mouth every morning    pregabalin (LYRICA) 100 MG capsule  Self No No   Sig: Take 1 capsule (100 mg) by mouth 4 times daily   sennosides (SENOKOT) 8.6 MG tablet  Self Yes No   Sig: Take one tablet in the morning and 2 tablets in the evening.   tamsulosin (FLOMAX) 0.4 MG capsule  Self Yes No   Sig: Take 0.8 mg by mouth daily    vitamin C (ASCORBIC ACID) 1000 MG TABS  Self Yes No   Sig: Take 1,000 mg by mouth daily      Facility-Administered Medications: None     Allergies   Allergies   Allergen Reactions     Cephalexin Diarrhea     Liraglutide Other (See Comments)     Sulfa Drugs Swelling     Pt has taken  Taken sulfa drugs orally without trouble. He had problems with Sulfa eye drops. Eye swelled up     Victoza      Increased migraine frequency and severity       Social History   I have reviewed this patient's social history and updated it with pertinent information if needed. Parmjit VILLEDA Riccamron  reports that he has never smoked. He has never used smokeless tobacco. He reports that he does not drink alcohol or use drugs.    Family History   I have reviewed this patient's family history and updated it with pertinent information if needed.   Family History   Problem Relation Age of Onset     Cerebrovascular Disease Mother      C.A.D. Mother      Hypertension Mother      Alcohol/Drug Mother      Arthritis Mother      Cerebrovascular Disease Maternal Grandmother      C.A.D. Maternal Grandmother      Alcohol/Drug Maternal Grandmother      C.A.D. Father      Heart Disease Father      Hypertension Father      Alcohol/Drug Father      Allergies Father      Circulatory Father      Depression Father      Respiratory Father      Asthma Father      Alcohol/Drug Paternal  Grandfather      Cerebrovascular Disease Paternal Grandfather      Depression Son      Psychotic Disorder Son         anxiety disorder     Depression Daughter      Cerebrovascular Disease Maternal Grandfather      Cerebrovascular Disease Paternal Grandmother      Diabetes Brother      Allergies Sister      Depression Sister      Gynecology Sister        Review of Systems   The 10 point Review of Systems is negative other than noted in the HPI    Physical Exam   Temp: 98  F (36.7  C) Temp src: Oral BP: 136/78   Heart Rate: 89 Resp: 16 SpO2: 94 % O2 Device: None (Room air)    Vital Signs with Ranges  Temp:  [98  F (36.7  C)] 98  F (36.7  C)  Heart Rate:  [89] 89  Resp:  [16] 16  BP: (136)/(78) 136/78  SpO2:  [94 %] 94 %  240 lbs 0 oz    Constitutional: Awake, alert, cooperative, anxious  Eyes: Conjunctiva and pupils examined and normal.  HEENT: Moist mucous membranes, normal dentition.  Respiratory: Clear to auscultation bilaterally, no crackles or wheezing.  Cardiovascular: Regular rate and rhythm, normal S1 and S2, and no murmur noted.  GI: Soft, non-distended, non-tender, normal bowel sounds.  Lymph/Hematologic: No anterior cervical or supraclavicular adenopathy.  Skin: No rashes, no cyanosis, +1 bilateral lower extremity edema with chronic stasis changes.  Musculoskeletal: No joint swelling, erythema or tenderness. Palpation of paraspinal areas reveals tenderness at right L3 paraspinal region, worsens with twisting movement and palpation.  Neurologic: Cranial nerves 2-12 intact, normal strength and sensation.  Psychiatric: Alert, oriented to person, place and time, anxious.    Data   Data reviewed today:  I personally reviewed CXR shows atelectasis at left base, otherwise clear. EKG shows anterolateral T wave flattening.  Recent Labs   Lab 04/19/20  2231 04/18/20  0730 04/17/20 2009   WBC 6.7 6.3 9.2   HGB 11.1* 11.0* 11.5*   MCV 98 98 97   * 109* 164   INR  --   --  1.18*    140 135   POTASSIUM 3.7  4.0 4.1   CHLORIDE 108 110* 103   CO2 24 22 23   BUN 11 18 23   CR 0.96 1.39* 2.09*   ANIONGAP 7 8 9   LUIS 8.3* 7.8* 8.4*   * 89 101*   ALBUMIN 2.7*  --  3.0*   PROTTOTAL 5.7*  --  5.5*   BILITOTAL 0.5  --  0.6   ALKPHOS 68  --  72   ALT 29  --  43   AST 16  --  14   LIPASE 110  --  249   TROPI 0.086*  --  0.040       Recent Results (from the past 24 hour(s))   Chest XR,  PA & LAT    Narrative    EXAM: XR CHEST 2 VW  LOCATION: [facility]  DATE/TIME: 4/19/2020 11:58 PM    INDICATION: Right flank pain. Elevated troponin. Weakness.  COMPARISON: 12/18/2018.      Impression    IMPRESSION: Minimal linear atelectasis or scar left base. Lungs otherwise clear. Heart size within normal limits.

## 2020-04-20 NOTE — PHARMACY-ADMISSION MEDICATION HISTORY
Pharmacy Medication History  Admission medication history interview status for the 4/19/2020  admission is complete. See EPIC admission navigator for prior to admission medications     Medication history sources: Patient   Medication history source reliability: Good  Adherence assessment: Good    Significant changes made to the medication list:  Patient was just discharged on sat. Nothing changed per patient       Additional medication history information:       Medication reconciliation completed by provider prior to medication history? No    Time spent in this activity: 5min       Prior to Admission medications    Medication Sig Last Dose Taking? Auth Provider   aspirin (ASPIRIN LOW DOSE) 81 MG tablet Take 1 tablet (81 mg) by mouth daily   Vince Henry MD   atorvastatin (LIPITOR) 10 MG tablet TAKE 1 TABLET(10 MG) BY MOUTH DAILY   Vince Henry MD   blood glucose (ACCU-CHEK COMPACT DRUM) test strip Use to test blood sugars 3 to 4 times daily.   Vince Henry MD   blood glucose (ACCU-CHEK MULTICLIX) lancing device Lancing device to be used with lancets.   Vince Henry MD   blood glucose monitoring (SOFTCLIX) lancets USE TO TEST BLOOD SUGAR 3-4 TIMES DAILY OR AS DIRECTED.   Reported, Patient   divalproex (DEPAKOTE) 500 MG EC tablet Take 2 tablets (1,000 mg) by mouth daily   Spike Tobin MD   glipiZIDE (GLUCOTROL XL) 2.5 MG 24 hr tablet Take 1 tablet (2.5 mg) by mouth daily   Vince Henry MD   lurasidone (LATUDA) 40 MG TABS tablet Take 40 mg by mouth At Bedtime   Unknown, Entered By History   metFORMIN (GLUCOPHAGE-XR) 500 MG 24 hr tablet Take 1 tablet (500 mg) by mouth 2 times daily (with meals) Until follow-up with primary doctor and lab recheck   Raphael Silvestre DO   modafinil (PROVIGIL) 200 MG tablet Take 200 mg by mouth every morning    Reported, Patient   Multiple Vitamins-Minerals (SENIOR MULTIVITAMIN PLUS PO) Take 1 tablet by mouth daily   Reported, Patient    NONFORMULARY by Intrathecal route continuous - + up to 3 boluses daily    Managed by [facility]     Medications in Pump:  fentanyl 2000mcg/mL  Bupivacaine 20mg/mL  Morphine 5.8mg/mL     Rate:   Fentanyl 900mcg/day  Bupivacaine 9mg/day  Morphine 2.6mg/day  Pump Last Fill Date:  4/16/2020   Unknown, Entered By History   pregabalin (LYRICA) 100 MG capsule Take 1 capsule (100 mg) by mouth 4 times daily   Vince Henry MD   sennosides (SENOKOT) 8.6 MG tablet Take one tablet in the morning and 2 tablets in the evening.      tamsulosin (FLOMAX) 0.4 MG capsule Take 0.8 mg by mouth daily    Reported, Patient   vitamin C (ASCORBIC ACID) 1000 MG TABS Take 1,000 mg by mouth daily   Reported, Patient

## 2020-04-20 NOTE — ED TRIAGE NOTES
3rd visit this week. Was DC'd home yesterday. Today reports feeling R flank pain and upper abd pain with generalized fatigue

## 2020-04-20 NOTE — ED PROVIDER NOTES
History     Chief Complaint: Abdominal pain    HPI   Parmjit Hernández is a 64 year old male with a history of chronic abdominal pain, diverticulitis, renal failure, obstructive sleep apnea, and amyloidosis who presents via EMS with abdominal pain. He was recently admitted to [facility] from 04/15-04/17 for 10 days of diarrhea. Abdomen CT showed new possible early cirrhosis, and it was suspected his symptoms were secondary to opioid withdrawal, as his intrathecal pump needed refill. He was admitted again from 04/17-04/18 after he took an extra dose of his lisinopril in an attempt to resolve his dizziness, leading to hypotension. His dizziness resolved with fluids. Of note, he was swabbed for COVID-19 on 04/15 and tested negative. Today, he reports worsening right-sided flank pain and a terrible generalized fatigue that began shortly after his discharge from the hospital yesterday.  He denies chest pain or dyspnea.  He said he has felt very weak and generally unwell.  He reports the flank pain does not change with food or with movement.  He said he has been very nauseated, but was able to eat.  He has not had vomiting.  He has not had any diarrhea for the past several days.  He has not had fever or chills.    Allergies:  Cephalexin  Liraglutide  Sulfa drugs  Victoza    Medications:    Aspirin 81mg  Atorvastatin  Glipizide  Metformin  Pregabalin  Sennosides  Tamsulosin  Lopressor  Lisinopril    Past Medical History:    Diverticulitis  Migraine  Cirrhosis  Anxiety  Chronic abdominal pain  Bipolar disorder  Narcotic dependence  Morbid obesity  Renal failure  Peripheral stem cell transplant  Obstructive sleep apnea  Amyloidosis  Diabetes mellitus   Hypertension   Obsessive-compulsive disorders  Hypercholesterolemia    Past Surgical History:    Cholecystectomy  Hernia repair  Bone marrow transplant  Deviated septum repair    Family History:    Cerebrovascular disease (mother)  Coronary artery disease (mother,  "father)  Hypertension (mother)  Alcohol/drug (mother, father)  Arthritis (mother)  Heart disease (father)  Circulatory (father)  Respiratory (father)  Depression (father, son, sister)  Asthma (father)  Anxiety (son)  Diabetes (brother)  Allergies (sister)    Social History:  Smoking status: Never  Alcohol use: No  Drug use: Cannabis  PCP: Vince Henry  Presents to the ED via EMS  Marital Status:   [4]     Review of Systems  Positive as stated in the HPI. Otherwise reviewed and negative    Physical Exam     Patient Vitals for the past 24 hrs:   BP Temp Temp src Pulse Heart Rate Resp SpO2 Height Weight   04/20/20 0115 (!) 140/83 -- -- 75 -- 10 97 % -- --   04/20/20 0100 124/78 -- -- 74 -- 9 97 % -- --   04/20/20 0045 122/67 -- -- 70 -- -- 92 % -- --   04/20/20 0030 110/67 -- -- 72 -- -- 96 % -- --   04/20/20 0015 125/76 -- -- 75 -- 9 -- -- --   04/19/20 2230 114/78 -- -- 85 86 9 94 % -- --   04/19/20 2215 -- -- -- -- 104 28 90 % -- --   04/19/20 2204 136/78 98  F (36.7  C) Oral -- 89 16 94 % 1.702 m (5' 7\") 108.9 kg (240 lb)   04/19/20 2200 136/78 -- -- 94 -- -- 90 % -- --       Physical Exam  Constitutional:  Appears well-developed and well-nourished. Cooperative. Anxious.  HENT:   Head:    Atraumatic.   Mouth/Throat:   Oropharynx is without erythema or exudate and mucous membranes are moist.   Eyes:    Conjunctivae normal and EOM are normal.      Pupils are equal, round, and reactive to light.   Neck:    Normal range of motion. Neck supple.   Cardiovascular:  Normal rate, regular rhythm, normal heart sounds and radial and    dorsalis pedis pulses are 1+ and symmetric. No calf tenderness.    Pulmonary/Chest:  Effort normal and breath sounds normal.   Abdominal:   Soft. Bowel sounds are normal.      No splenomegaly or hepatomegaly. No tenderness. No rebound.   Musculoskeletal:  Normal range of motion. No edema and no tenderness.   Neurological:  Alert. Normal strength. No cranial nerve deficit. GCS " 15.  Skin:    Skin is warm and dry.   Psychiatric:   Normal mood and affect.     Emergency Department Course   ECG (22:14:05):  Rate 89 bpm. WV interval 196. QRS duration 76. QT/QTc 380/462. P-R-T axes 58 -55 -14. Normal sinus rhythm. Left axis deviation. Inferior infarct, age undetermined. Anterolateral infarct, age undetermined. New ant/lat T wave flattening, criteria for infarct. Significant change from EKG dated 04/17/2020. Interpreted at 2244 by Joshua Varela MD.    Imaging:  Radiographic findings were communicated with the patient who voiced understanding of the findings.  Chest XR Port 1 view:  Minimal linear atelectasis or scar left base. Lungs otherwise clear. Heart size within normal limits.   As read by radiology.     Laboratory:  CBC: HGB 11.1 (L),  (L), o/w WNL (WBC 6.7)  CMP: Glucose 145 (H), Calcium 8.3 (L), Albumin 2.7 (L), Protein total 5.7 (L), o/w WNL (Creatinine 0.96)  Nt probnp: 1501 (H)  Lipase: 110  Troponin (collected 2231): 0.086 (H)    Procedures: None.     Interventions:  2334 Aspirin 162 mg PO  2339 Zofran 4 mg IV  0008 Heparin 25,000 units 1000 units/hr IV  0011 Heparin bolus 5000 units IV    Emergency Department Course:  Past medical records, nursing notes, and vitals reviewed.  2157: Patient arrived via EMS.   2200: I performed an exam of the patient and obtained history, as documented above.  EKG performed, results above.  IV inserted and blood drawn.  The patient was sent for a chest XR while in the emergency department, findings above.    Findings and plan explained to the Patient who consents to admission.     Discussed the patient with Dr. Sandoval, who will admit the patient to a [facility]/tele bed for further monitoring, evaluation, and treatment.     Impression & Plan    Medical Decision Making:  Erickson Hernández presents complaining of right-sided flank pain, severe fatligue, and increased weakeness, beginning yesterday afternoon after he was discharged from the hospital. He  was concerned something terrible might've been goiung on with his kidneys. He has been hospitalized twice in the last several days for flank pain and acute kidney injury.     The EKG done at triage showed some new changes with criteria for anterolateral infarct that were not seen on his most recent old EKG. Despite the fact that he is not having any chest pain or dyspnea, I was concerned that his nausea and flank pain might have been an anginal equivalent. Troponin was checked and was positive. He had a recent normal troponin. Other laboratory testing looks fairly unremarkable as above. Kidney function is normal. Chest XR looks unchanged from old. BMP is mildly elevated.     Patient was given Zofran for nausea. He got a dose of aspirin and was started on heparin.     Serial abdominal exam remains benign. I don't htin k his flank pain is due to urinary tract infection, diverticulitis. I don't think there is any reason for reimaging of the abdomien. His flank pain does not seem consistent with pulmonary embolism. There is no dyspnea or pleuritic pain.     Nausea improved and he is still having his chronic abdominal pain but no chest discomfort. I don't think he needs to go emergently to the cath lab. I talked to Dr. Sandoval who agreed to accept him for admission. Discussed test results with him and patient voiced understanding.     Critical Care time:  none    Diagnosis:    ICD-10-CM    1. Nausea  R11.0    2. Flank pain  R10.9    3. Elevated troponin  R79.89    4. NSTEMI (non-ST elevated myocardial infarction) (H)  I21.4        Disposition: Ad[place] [facility]    Venkat SPRINGER, am serving as a scribe at 11:09 PM on 4/19/2020 to document services personally performed by Joshua Varela MD based on my observations and the provider's statements to me.     Venkat Garcia  4/19/2020    EMERGENCY DEPARTMENT       Joshua Varela MD  04/20/20 0699

## 2020-04-20 NOTE — PROGRESS NOTES
RECEIVING UNIT ED HANDOFF REVIEW    ED Nurse Handoff Report was reviewed by: Adrianne Perry RN on April 20, 2020 at 1:04 AM

## 2020-04-20 NOTE — TELEPHONE ENCOUNTER
ED/Discharge Protocol    Patient currently hospitalized again   Will f/u with pt once discharged  Stacie MAYA RN

## 2020-04-20 NOTE — ED NOTES
Bed: ED24  Expected date: 4/19/20  Expected time: 9:50 PM  Means of arrival: Ambulance  Comments:  [address] 64M flank pain

## 2020-04-20 NOTE — ED NOTES
[facility]  ED Nurse Handoff Report    ED Chief complaint: Abdominal Pain and Flank Pain      ED Diagnosis:   Final diagnoses:   Nausea   Flank pain   Elevated troponin   NSTEMI (non-ST elevated myocardial infarction) (H)       Code Status: Full Code    Allergies:   Allergies   Allergen Reactions     Cephalexin Diarrhea     Liraglutide Other (See Comments)     Sulfa Drugs Swelling     Pt has taken  Taken sulfa drugs orally without trouble. He had problems with Sulfa eye drops. Eye swelled up     Victoza      Increased migraine frequency and severity       Patient Story: abd pn fatigue with edema  Focused Assessment:  same    Treatments and/or interventions provided:   Medications   heparin 25,000 units in 0.45% NaCl 250 mL ANTICOAGULANT  infusion (1,000 Units/hr Intravenous New Bag 4/20/20 0008)   aspirin (ASA) chewable tablet 162 mg (162 mg Oral Given 4/19/20 2334)   ondansetron (ZOFRAN) injection 4 mg (4 mg Intravenous Given 4/19/20 2339)   heparin ANTICOAGULANT  Loading Dose bolus dose from infusion pump 5,000 Units (5,000 Units Intravenous Given 4/20/20 0011)       Patient's response to treatments and/or interventions: .    To be done/followed up on inpatient unit:  na    Does this patient have any cognitive concerns?: na    Activity level - Baseline/Home:  Independent  Activity Level - Current:   Independent    Patient's Preferred language: English   Needed?: No    Isolation: None  Infection: Not Applicable  Bariatric?: No    Vital Signs:   Vitals:    04/19/20 2215 04/19/20 2230 04/20/20 0015 04/20/20 0030   BP:  114/78 125/76 110/67   Pulse:  85 75 72   Resp: 28 9 9    Temp:       TempSrc:       SpO2: 90% 94%  96%   Weight:       Height:           Cardiac Rhythm:     Was the PSS-3 completed:   Yes  What interventions are required if any?               Family Comments: na  OBS brochure/video discussed/provided to patient/family: N/A              Name of person given brochure if not  patient: .              Relationship to patient: .    For the majority of the shift this patient's behavior was Green.   Behavioral interventions performed were .    ED NURSE PHONE NUMBER: [phone]

## 2020-04-20 NOTE — TELEPHONE ENCOUNTER
"Was in hospital 4/15 - 4/17 and 4/17-4/18 for decr'd kidney function and hypotension. Also has amyloidosis.  Pt states yesterday and today he is \"extremely nauseated\" and \"completely exhausted\". Has been in bed all day. Trying to drink fluids but finds even water exacerbates his nausea. No vomiting. C/o low back and bilat flank pain but lists nausea and exhaustion as main concerns. T 98.8. Still urinating. Really sounds miserable. Advised ER now.     Reason for Disposition    Patient sounds very sick or weak to the triager    Additional Information    Negative: [1] Nausea or vomiting AND [2] pregnancy < 20 weeks    Negative: Menstrual Period - Missed or Late (i.e., pregnancy suspected)    Negative: Heat exhaustion suspected (i.e., dehydration from heat exposure)    Negative: Motion sickness suspected (i.e., nausea with car, plane, boat, or train travel)    Negative: Anxiety or stress suspected (i.e., nausea with anxiety attacks or stressful situations)    Negative: Traumatic Brain Injury (TBI) suspected    Negative: Nausea (or Vomiting) in a cancer patient who is currently (or recently) receiving chemotherapy or radiation therapy, or cancer patient who has metastatic or end-stage cancer and is receiving palliative care    Negative: Vomiting occurs    Negative: Other symptom is present, see that guideline.  (e.g., chest pain, headache, dizziness, abdominal pain, colds, sore throat, etc.).    Negative: Unable to walk, or can only walk with assistance (e.g., requires support)    Negative: Difficulty breathing    Negative: [1] Insulin-dependent diabetes (Type I) AND [2] glucose > 400 mg/dl (22 mmol/l)    Negative: [1] Drinking very little AND [2] dehydration suspected (e.g., no urine > 12 hours, very dry mouth, very lightheaded)    Protocols used: NAUSEA-A-AH      "

## 2020-04-20 NOTE — PROGRESS NOTES
Brief hospitalist note  Seen and examined  Known to me, I discharged him 2 days ago after KWABENA from dehydration and extra dosages of lisinopril    Biggest complaint this morning is generalized fatigue and right flank pain. Reviewed his CT scan from prior admissions, nothing obvious from the right flank.     A/P  1. Generalized fatigue: I think he is most likely over-medicated and might have some residual clearance issues from his recovering KWABENA  nonfocal neurological examination  Plan  - tsh  - reduce lyrica, consider further reduction in the AM  - continue his intrathecal pump    2. Right flank pain  Most likely muskuloskeletal given it is easily palpable  He is concerned about his kidneys, but CT reviewd previously: only minor cysts  I did agree to obtain renal US, but very low yield  Not consistent with stone  Plan  - UA  - renal US    3. Troponin elevation likely type 2 NSTEMI  Likely residual from KWABENA  He has had no CP complaints  Cardiology evaluation obtained, nothing else from their persepctive  Plan  - echocardiogram      Likely home in the AM pending these studies and his symptoms.

## 2020-04-20 NOTE — PROVIDER NOTIFICATION
"MD Notification    Notified Person: MD    Notified Person Name: Dr. Silvestre    Notification Date/Time: 1407 4-20-20    Notification Interaction: text page sent    Purpose of Notification: pt has headache, states tylenol isnt working, he states  \" I take a combo of ibuprofen 800 and tylenol 1000mg at home for this\" Requested for Md to consider additional medication options for pain.     Orders Received: ibuprofen 200 mg q6hr      "

## 2020-04-20 NOTE — PROGRESS NOTES
A&O x 4. RA. VSS. Tele:  w/20K @ 100mL/hr. Heparin gtt @ 10mL/hr. Compazine x 1 for nausea, good results. Blood glucose 83, hospitalist notified. Tylenol x 1 for headache, good release. SBA to bathroom. Will continue w/plan of care.

## 2020-04-20 NOTE — PLAN OF CARE
A/O, VSS, O2sats 94% on RA. Tele SR c BBB. No c/o flank pain but 5-7/10 headache mildly improved with alternating tylenol/ibuprofen. Fairly constant nausea, no emesis, worsened slightly with PO intake but otherwise tolerated meals; compazine PO given with some improvement. Independent, steady. IV heparin, fluids off. No sliding scale insulin given, BG controlled. Renal US, TTE completed. Miconazole powder applied to groin and L underarm rash. Plan for possible discharge tomorrow, although patient is concerned to discharge without clear source of nausea, fatigue identified.

## 2020-04-20 NOTE — CONSULTS
[facility]    Cardiology Consultation     Date of Admission:  4/19/2020    Primary Care Physician   Vince Henry     Consult Date:  04/20/2020      REASON FOR CONSULTATION:  Troponin elevation.      REFERRING PHYSICIAN:  Hospitalist Service      IMPRESSION:   1.  Troponin elevation secondary to acute renal failure.  The patient also has a history of chronic troponin elevation.   2.  Amyloidosis, status post bone marrow transplant.   3.  Diabetes mellitus.   4.  Morbid obesity.   5.  Bipolar disorder.   6.  Chronic anemia.   7.  Hyperlipidemia.      This gentleman tells me that he has a history of chronic troponin elevation.  He had coronary angiography at [facility] in 2018, which demonstrated normal coronary arteries.  His EKG shows right bundle branch block with nonspecific intraventricular conduction delay, which is unchanged from previously.  He has no symptoms of angina and congestive heart failure.      I do not think further cardiac workup is necessary at this time.  He certainly does not have acute coronary syndrome and as such, we should stop the intravenous heparin.  We will sign off, but we will be happy to see him again in the future if necessary.      HISTORY OF PRESENT ILLNESS:  A pleasant 64-year-old gentleman with history as above.  He was admitted on this occasion with right-sided flank pain, nausea, and profound fatigue.  He was found to be in acute renal failure with a creatinine of 2.09 several days ago.  Fortunately, this has almost normalized.  His troponins are 0.086, 0.077, 0.075.  In other words, there is no acute rise pattern compatible with acute coronary syndrome.      The patient tells me he himself has been extensively evaluated for troponin elevation.  He had coronary angiography in 2018 at [facility], and I personally reviewed the report, which read normal coronary arteries.  In addition, he had an echocardiogram in 2019 at the [facility], which was essentially normal for his age.      He did have an episode of nonexertional chest discomfort lasting minutes several weeks ago, but more recently, he described no chest pains or exertional shortness of breath.        Past Medical History   I have reviewed this patient's medical history and updated it with pertinent information if needed.   Past Medical History:   Diagnosis Date     Allergic state      Amyloidosis (H)      Diabetes mellitus (H)     type 2     H/O bone marrow transplant (H)      Headache(784.0)      Hypertension 2007     Myalgia and myositis, unspecified      Obsessive-compulsive disorders      OCD (obsessive compulsive disorder)      Other acquired absence of organ 94     Other specified viral warts      Pain in the abdomen      Pure hypercholesterolemia      Sleep apnea        Past Surgical History   I have reviewed this patient's surgical history and updated it with pertinent information if needed.  Past Surgical History:   Procedure Laterality Date     BMT PROTOCOL      for systemic amyloidosis     BONE MARROW BIOPSY, BONE SPECIMEN, NEEDLE/TROCAR N/A 6/8/2016    Procedure: BIOPSY BONE MARROW;  Surgeon: Nathan Agrawal MD;  Location:  GI     BONE MARROW BIOPSY, BONE SPECIMEN, NEEDLE/TROCAR N/A 2/20/2019    Procedure: BIOPSY BONE MARROW;  Surgeon: Michael Raygoza MD;  Location:  GI     C NONSPECIFIC PROCEDURE  94    Cholecystectomy     C NONSPECIFIC PROCEDURE  2000    repair deviated septum     CHOLECYSTECTOMY  1995    lap qian     COLONOSCOPY  2012    hx polyps     ESOPHAGOSCOPY, GASTROSCOPY, DUODENOSCOPY (EGD), COMBINED N/A 5/29/2015    Procedure: COMBINED ESOPHAGOSCOPY, GASTROSCOPY, DUODENOSCOPY (EGD), BIOPSY SINGLE OR MULTIPLE;  Surgeon: John Jacob MD;  Location:  GI     HERNIA REPAIR, UMBILICAL  2006       Prior to Admission Medications   Prior to Admission Medications   Prescriptions Last Dose Informant Patient Reported? Taking?    Multiple Vitamins-Minerals (SENIOR MULTIVITAMIN PLUS PO) 4/19/2020 at Unknown time Self Yes Yes   Sig: Take 1 tablet by mouth daily   NONFORMULARY  Self Yes No   Sig: by Intrathecal route continuous - + up to 3 boluses daily    Managed by Dr Pawan Shaw [facility]     Medications in Pump:  fentanyl 2000mcg/mL  Bupivacaine 20mg/mL  Morphine 5.8mg/mL     Rate:   Fentanyl 900mcg/day  Bupivacaine 9mg/day  Morphine 2.6mg/day  Pump Last Fill Date:  4/16/2020   aspirin (ASPIRIN LOW DOSE) 81 MG tablet 4/19/2020 at Unknown time Self No Yes   Sig: Take 1 tablet (81 mg) by mouth daily   atorvastatin (LIPITOR) 10 MG tablet 4/19/2020 at Unknown time Self No Yes   Sig: TAKE 1 TABLET(10 MG) BY MOUTH DAILY   blood glucose (ACCU-CHEK COMPACT DRUM) test strip  Self No No   Sig: Use to test blood sugars 3 to 4 times daily.   blood glucose (ACCU-CHEK MULTICLIX) lancing device  Self No No   Sig: Lancing device to be used with lancets.   blood glucose monitoring (ACCU-CHEK COMPACT CARE KIT) meter device kit  Self No No   Sig: Use to test blood sugars 3 to 4  times daily.   blood glucose monitoring (SOFTCLIX) lancets  Self Yes No   Sig: USE TO TEST BLOOD SUGAR 3-4 TIMES DAILY OR AS DIRECTED.   divalproex (DEPAKOTE) 500 MG EC tablet 4/19/2020 at Unknown time Self No Yes   Sig: Take 2 tablets (1,000 mg) by mouth daily   glipiZIDE (GLUCOTROL XL) 2.5 MG 24 hr tablet 4/19/2020 at Unknown time Self No Yes   Sig: Take 1 tablet (2.5 mg) by mouth daily   lurasidone (LATUDA) 40 MG TABS tablet 4/19/2020 at Unknown time Self Yes Yes   Sig: Take 40 mg by mouth At Bedtime   metFORMIN (GLUCOPHAGE-XR) 500 MG 24 hr tablet 4/19/2020 at Unknown time Self No Yes   Sig: Take 1 tablet (500 mg) by mouth 2 times daily (with meals) Until follow-up with primary doctor and lab recheck   modafinil (PROVIGIL) 200 MG tablet 4/19/2020 at Unknown time Self Yes Yes   Sig: Take 200 mg by mouth every morning    pregabalin (LYRICA) 100 MG capsule 4/19/2020 at  Unknown time Self No Yes   Sig: Take 1 capsule (100 mg) by mouth 4 times daily   sennosides (SENOKOT) 8.6 MG tablet 4/19/2020 at Unknown time Self Yes Yes   Sig: Take one tablet in the morning and 2 tablets in the evening.   tamsulosin (FLOMAX) 0.4 MG capsule 4/19/2020 at Unknown time Self Yes Yes   Sig: Take 0.8 mg by mouth daily    vitamin C (ASCORBIC ACID) 1000 MG TABS 4/19/2020 at Unknown time Self Yes Yes   Sig: Take 1,000 mg by mouth daily      Facility-Administered Medications: None     Allergies   Allergies   Allergen Reactions     Cephalexin Diarrhea     Liraglutide Other (See Comments)     Sulfa Drugs Swelling     Pt has taken  Taken sulfa drugs orally without trouble. He had problems with Sulfa eye drops. Eye swelled up     Victoza      Increased migraine frequency and severity       Social History   I have reviewed this patient's social history and updated it with pertinent information if needed. Parmjit Hernández  reports that he has never smoked. He has never used smokeless tobacco. He reports that he does not drink alcohol or use drugs.    Family History   I have reviewed this patient's family history and updated it with pertinent information if needed.   Family History   Problem Relation Age of Onset     Cerebrovascular Disease Mother      C.A.D. Mother      Hypertension Mother      Alcohol/Drug Mother      Arthritis Mother      Cerebrovascular Disease Maternal Grandmother      C.A.D. Maternal Grandmother      Alcohol/Drug Maternal Grandmother      C.A.D. Father      Heart Disease Father      Hypertension Father      Alcohol/Drug Father      Allergies Father      Circulatory Father      Depression Father      Respiratory Father      Asthma Father      Alcohol/Drug Paternal Grandfather      Cerebrovascular Disease Paternal Grandfather      Depression Son      Psychotic Disorder Son         anxiety disorder     Depression Daughter      Cerebrovascular Disease Maternal Grandfather      Cerebrovascular  Disease Paternal Grandmother      Diabetes Brother      Allergies Sister      Depression Sister      Gynecology Sister        Review of Systems   The 10 point Review of Systems is negative other than noted in the HPI or here.     Physical Exam   Temp: 97.8  F (36.6  C) Temp src: Oral BP: 98/61 Pulse: 73 Heart Rate: 70 Resp: 12 SpO2: 94 % O2 Device: None (Room air)    Vital Signs with Ranges  Temp:  [97.7  F (36.5  C)-98  F (36.7  C)] 97.8  F (36.6  C)  Pulse:  [70-94] 73  Heart Rate:  [] 70  Resp:  [9-28] 12  BP: ()/(61-83) 98/61  SpO2:  [90 %-97 %] 94 %  240 lbs 0 oz     GENERAL:  Pleasant gentleman in no acute distress.   VITAL SIGNS:  Blood pressure 98/61.  Heart rate is in the 70s, normal sinus rhythm.   HEENT:  Unremarkable.  Dentition is unremarkable.     SKIN:  No clubbing, no peripheral central cyanosis.   NECK:  No raised JVP, no thyromegaly.   CARDIAC:  Cardiac apex not palpable.  Heart sounds are unremarkable.   CHEST:  Symmetrical expansion without the use of accessory muscles.  Breath sounds are quiet but normal.   ABDOMEN:  Soft and nontender.  No hepatosplenomegaly.  The abdominal aorta is not palpable.   EXTREMITIES:  He has 1+ bilateral ankle edema with evidence of chronic venous stasis.  Foot pulses are diminished.   CENTRAL NERVOUS SYSTEM:  Grossly intact.   PSYCHIATRIC:  Mood appears normal.      LABORATORY INVESTIGATIONS:  Potassium 3.9, chloride 110, sodium 139, creatinine 0.96.  Albumin 2.7, alkaline phosphatase 68.  Hemoglobin 11.1, white cell count 4.5, platelet count of 121.        Chest x-ray described as minimal linear atelectasis or scar in left base.  Lungs are otherwise clear and heart size within normal limits.        Data   Results for orders placed or performed during the hospital encounter of 04/19/20 (from the past 24 hour(s))   EKG 12-lead, tracing only   Result Value Ref Range    Interpretation ECG Click View Image link to view waveform and result    CBC with  platelets differential   Result Value Ref Range    WBC 6.7 4.0 - 11.0 10e9/L    RBC Count 3.23 (L) 4.4 - 5.9 10e12/L    Hemoglobin 11.1 (L) 13.3 - 17.7 g/dL    Hematocrit 31.5 (L) 40.0 - 53.0 %    MCV 98 78 - 100 fl    MCH 34.4 (H) 26.5 - 33.0 pg    MCHC 35.2 31.5 - 36.5 g/dL    RDW 14.4 10.0 - 15.0 %    Platelet Count 109 (L) 150 - 450 10e9/L    Diff Method Automated Method     % Neutrophils 77.9 %    % Lymphocytes 12.2 %    % Monocytes 6.8 %    % Eosinophils 2.7 %    % Basophils 0.3 %    % Immature Granulocytes 0.1 %    Nucleated RBCs 0 0 /100    Absolute Neutrophil 5.2 1.6 - 8.3 10e9/L    Absolute Lymphocytes 0.8 0.8 - 5.3 10e9/L    Absolute Monocytes 0.5 0.0 - 1.3 10e9/L    Absolute Eosinophils 0.2 0.0 - 0.7 10e9/L    Absolute Basophils 0.0 0.0 - 0.2 10e9/L    Abs Immature Granulocytes 0.0 0 - 0.4 10e9/L    Absolute Nucleated RBC 0.0    Comprehensive metabolic panel   Result Value Ref Range    Sodium 139 133 - 144 mmol/L    Potassium 3.7 3.4 - 5.3 mmol/L    Chloride 108 94 - 109 mmol/L    Carbon Dioxide 24 20 - 32 mmol/L    Anion Gap 7 3 - 14 mmol/L    Glucose 145 (H) 70 - 99 mg/dL    Urea Nitrogen 11 7 - 30 mg/dL    Creatinine 0.96 0.66 - 1.25 mg/dL    GFR Estimate 83 >60 mL/min/[1.73_m2]    GFR Estimate If Black >90 >60 mL/min/[1.73_m2]    Calcium 8.3 (L) 8.5 - 10.1 mg/dL    Bilirubin Total 0.5 0.2 - 1.3 mg/dL    Albumin 2.7 (L) 3.4 - 5.0 g/dL    Protein Total 5.7 (L) 6.8 - 8.8 g/dL    Alkaline Phosphatase 68 40 - 150 U/L    ALT 29 0 - 70 U/L    AST 16 0 - 45 U/L   Lipase   Result Value Ref Range    Lipase 110 73 - 393 U/L   Troponin I   Result Value Ref Range    Troponin I ES 0.086 (H) 0.000 - 0.045 ug/L   Nt probnp inpatient   Result Value Ref Range    N-Terminal Pro BNP Inpatient 1,501 (H) 0 - 900 pg/mL   Chest XR,  PA & LAT    Narrative    EXAM: XR CHEST 2 VW  LOCATION: [facility]  DATE/TIME: 4/19/2020 11:58 PM    INDICATION: Right flank pain. Elevated troponin. Weakness.  COMPARISON:  12/18/2018.      Impression    IMPRESSION: Minimal linear atelectasis or scar left base. Lungs otherwise clear. Heart size within normal limits.   Troponin I   Result Value Ref Range    Troponin I ES 0.077 (H) 0.000 - 0.045 ug/L   Glucose by meter   Result Value Ref Range    Glucose 83 70 - 99 mg/dL   CBC with platelets   Result Value Ref Range    WBC 4.5 4.0 - 11.0 10e9/L    RBC Count 3.30 (L) 4.4 - 5.9 10e12/L    Hemoglobin 11.1 (L) 13.3 - 17.7 g/dL    Hematocrit 33.1 (L) 40.0 - 53.0 %     78 - 100 fl    MCH 33.6 (H) 26.5 - 33.0 pg    MCHC 33.5 31.5 - 36.5 g/dL    RDW 14.5 10.0 - 15.0 %    Platelet Count 121 (L) 150 - 450 10e9/L   Troponin I   Result Value Ref Range    Troponin I ES 0.075 (H) 0.000 - 0.045 ug/L   Heparin Xa (10a) Level   Result Value Ref Range    Heparin 10A Level <0.10 IU/mL   Basic metabolic panel   Result Value Ref Range    Sodium 139 133 - 144 mmol/L    Potassium 3.9 3.4 - 5.3 mmol/L    Chloride 110 (H) 94 - 109 mmol/L    Carbon Dioxide 22 20 - 32 mmol/L    Anion Gap 7 3 - 14 mmol/L    Glucose 86 70 - 99 mg/dL    Urea Nitrogen 14 7 - 30 mg/dL    Creatinine 0.96 0.66 - 1.25 mg/dL    GFR Estimate 84 >60 mL/min/[1.73_m2]    GFR Estimate If Black >90 >60 mL/min/[1.73_m2]    Calcium 8.2 (L) 8.5 - 10.1 mg/dL   TSH with free T4 reflex   Result Value Ref Range    TSH 1.74 0.40 - 4.00 mU/L   Glucose by meter   Result Value Ref Range    Glucose 94 70 - 99 mg/dL   UA with Microscopic reflex to Culture    Specimen: Urine clean catch; Midstream Urine   Result Value Ref Range    Color Urine Yellow     Appearance Urine Clear     Glucose Urine Negative NEG^Negative mg/dL    Bilirubin Urine PENDING NEG^Negative    Ketones Urine Negative NEG^Negative mg/dL    Specific Gravity Urine 1.025 1.003 - 1.035    Blood Urine Negative NEG^Negative    pH Urine 6.0 5.0 - 7.0 pH    Protein Albumin Urine Trace (A) NEG^Negative mg/dL    Urobilinogen mg/dL Small 0.0 - 2.0 mg/dL    Nitrite Urine Negative NEG^Negative     Leukocyte Esterase Urine Negative NEG^Negative    Source Midstream Urine     WBC Urine <1 0 - 5 /HPF    RBC Urine 0 0 - 2 /HPF   Troponin I   Result Value Ref Range    Troponin I ES 0.065 (H) 0.000 - 0.045 ug/L   US Renal Complete    Narrative    US RENAL COMPLETE 2020 10:42 AM    CLINICAL HISTORY: right flank pain  TECHNIQUE: Routine Bilateral Renal and Bladder Ultrasound.    COMPARISON: CT 4/15/2020    FINDINGS:    RIGHT KIDNEY: 12.9 x 6.3 x 6 cm. Normal without hydronephrosis or  masses.     LEFT KIDNEY: 13.2 x 6 x 6.5 cm. Normal without hydronephrosis or  masses.     BLADDER: Decompressed.      Impression    IMPRESSION:  1.  Normal kidney ultrasound.    MD TARA MCCORD MD, [facility]             D: 2020   T: 2020   MT: DONALD      Name:     JULIO PRESCOTT   MRN:      [id number]        Account:       [id number]   :      1955           Consult Date:  2020      Document: [id number]

## 2020-04-21 DIAGNOSIS — R16.1 SPLEEN ENLARGED: Primary | ICD-10-CM

## 2020-04-21 DIAGNOSIS — K74.69 OTHER CIRRHOSIS OF LIVER (H): ICD-10-CM

## 2020-04-21 LAB
ANION GAP SERPL CALCULATED.3IONS-SCNC: 5 MMOL/L (ref 3–14)
BASOPHILS # BLD AUTO: 0 10E9/L (ref 0–0.2)
BASOPHILS NFR BLD AUTO: 0.4 %
BUN SERPL-MCNC: 9 MG/DL (ref 7–30)
CALCIUM SERPL-MCNC: 8.3 MG/DL (ref 8.5–10.1)
CHLORIDE SERPL-SCNC: 110 MMOL/L (ref 94–109)
CO2 SERPL-SCNC: 26 MMOL/L (ref 20–32)
CREAT SERPL-MCNC: 0.91 MG/DL (ref 0.66–1.25)
DIFFERENTIAL METHOD BLD: ABNORMAL
EOSINOPHIL # BLD AUTO: 0.1 10E9/L (ref 0–0.7)
EOSINOPHIL NFR BLD AUTO: 4.8 %
ERYTHROCYTE [DISTWIDTH] IN BLOOD BY AUTOMATED COUNT: 14.1 % (ref 10–15)
GFR SERPL CREATININE-BSD FRML MDRD: 89 ML/MIN/{1.73_M2}
GLUCOSE BLDC GLUCOMTR-MCNC: 101 MG/DL (ref 70–99)
GLUCOSE BLDC GLUCOMTR-MCNC: 103 MG/DL (ref 70–99)
GLUCOSE BLDC GLUCOMTR-MCNC: 122 MG/DL (ref 70–99)
GLUCOSE BLDC GLUCOMTR-MCNC: 132 MG/DL (ref 70–99)
GLUCOSE BLDC GLUCOMTR-MCNC: 150 MG/DL (ref 70–99)
GLUCOSE SERPL-MCNC: 119 MG/DL (ref 70–99)
HCT VFR BLD AUTO: 28.8 % (ref 40–53)
HGB BLD-MCNC: 9.9 G/DL (ref 13.3–17.7)
IMM GRANULOCYTES # BLD: 0 10E9/L (ref 0–0.4)
IMM GRANULOCYTES NFR BLD: 0.4 %
LYMPHOCYTES # BLD AUTO: 0.4 10E9/L (ref 0.8–5.3)
LYMPHOCYTES NFR BLD AUTO: 15.3 %
MCH RBC QN AUTO: 33.7 PG (ref 26.5–33)
MCHC RBC AUTO-ENTMCNC: 34.4 G/DL (ref 31.5–36.5)
MCV RBC AUTO: 98 FL (ref 78–100)
MONOCYTES # BLD AUTO: 0.3 10E9/L (ref 0–1.3)
MONOCYTES NFR BLD AUTO: 11.2 %
NEUTROPHILS # BLD AUTO: 1.7 10E9/L (ref 1.6–8.3)
NEUTROPHILS NFR BLD AUTO: 67.9 %
NRBC # BLD AUTO: 0 10*3/UL
NRBC BLD AUTO-RTO: 0 /100
PLATELET # BLD AUTO: 97 10E9/L (ref 150–450)
POTASSIUM SERPL-SCNC: 4 MMOL/L (ref 3.4–5.3)
RBC # BLD AUTO: 2.94 10E12/L (ref 4.4–5.9)
SODIUM SERPL-SCNC: 141 MMOL/L (ref 133–144)
WBC # BLD AUTO: 2.5 10E9/L (ref 4–11)

## 2020-04-21 PROCEDURE — 99233 SBSQ HOSP IP/OBS HIGH 50: CPT | Performed by: HOSPITALIST

## 2020-04-21 PROCEDURE — 25000132 ZZH RX MED GY IP 250 OP 250 PS 637: Performed by: HOSPITALIST

## 2020-04-21 PROCEDURE — 40000141 ZZH STATISTIC PERIPHERAL IV START W/O US GUIDANCE

## 2020-04-21 PROCEDURE — 85025 COMPLETE CBC W/AUTO DIFF WBC: CPT | Performed by: HOSPITALIST

## 2020-04-21 PROCEDURE — 00000146 ZZHCL STATISTIC GLUCOSE BY METER IP

## 2020-04-21 PROCEDURE — 25000128 H RX IP 250 OP 636: Performed by: HOSPITALIST

## 2020-04-21 PROCEDURE — 36415 COLL VENOUS BLD VENIPUNCTURE: CPT | Performed by: HOSPITALIST

## 2020-04-21 PROCEDURE — 21000001 ZZH R&B HEART CARE

## 2020-04-21 PROCEDURE — 80048 BASIC METABOLIC PNL TOTAL CA: CPT | Performed by: HOSPITALIST

## 2020-04-21 RX ORDER — PREGABALIN 75 MG/1
75 CAPSULE ORAL 3 TIMES DAILY
Status: DISCONTINUED | OUTPATIENT
Start: 2020-04-21 | End: 2020-04-23 | Stop reason: HOSPADM

## 2020-04-21 RX ADMIN — PROCHLORPERAZINE MALEATE 10 MG: 5 TABLET ORAL at 05:20

## 2020-04-21 RX ADMIN — TAMSULOSIN HYDROCHLORIDE 0.8 MG: 0.4 CAPSULE ORAL at 10:02

## 2020-04-21 RX ADMIN — ASPIRIN 81 MG: 81 TABLET, DELAYED RELEASE ORAL at 10:02

## 2020-04-21 RX ADMIN — LURASIDONE HYDROCHLORIDE 40 MG: 40 TABLET, FILM COATED ORAL at 22:19

## 2020-04-21 RX ADMIN — PROCHLORPERAZINE EDISYLATE 10 MG: 5 INJECTION INTRAMUSCULAR; INTRAVENOUS at 18:07

## 2020-04-21 RX ADMIN — SENNOSIDES 1 TABLET: 8.6 TABLET, FILM COATED ORAL at 13:35

## 2020-04-21 RX ADMIN — PREGABALIN 75 MG: 75 CAPSULE ORAL at 22:19

## 2020-04-21 RX ADMIN — PREGABALIN 75 MG: 75 CAPSULE ORAL at 13:36

## 2020-04-21 RX ADMIN — IBUPROFEN 200 MG: 200 TABLET, FILM COATED ORAL at 13:36

## 2020-04-21 RX ADMIN — SENNOSIDES 1 TABLET: 8.6 TABLET, FILM COATED ORAL at 20:12

## 2020-04-21 RX ADMIN — ACETAMINOPHEN 650 MG: 325 TABLET, FILM COATED ORAL at 10:01

## 2020-04-21 RX ADMIN — ACETAMINOPHEN 650 MG: 325 TABLET, FILM COATED ORAL at 14:34

## 2020-04-21 RX ADMIN — DIVALPROEX SODIUM 1000 MG: 500 TABLET, DELAYED RELEASE ORAL at 10:02

## 2020-04-21 RX ADMIN — PREGABALIN 75 MG: 75 CAPSULE ORAL at 10:02

## 2020-04-21 RX ADMIN — PROCHLORPERAZINE MALEATE 10 MG: 5 TABLET ORAL at 11:56

## 2020-04-21 RX ADMIN — ATORVASTATIN CALCIUM 10 MG: 10 TABLET, FILM COATED ORAL at 20:12

## 2020-04-21 RX ADMIN — ACETAMINOPHEN 650 MG: 325 TABLET, FILM COATED ORAL at 05:20

## 2020-04-21 RX ADMIN — ONDANSETRON 4 MG: 4 TABLET, ORALLY DISINTEGRATING ORAL at 10:00

## 2020-04-21 RX ADMIN — ONDANSETRON 4 MG: 2 INJECTION INTRAMUSCULAR; INTRAVENOUS at 20:12

## 2020-04-21 ASSESSMENT — ACTIVITIES OF DAILY LIVING (ADL)
ADLS_ACUITY_SCORE: 16
ADLS_ACUITY_SCORE: 16
ADLS_ACUITY_SCORE: 15
ADLS_ACUITY_SCORE: 16
ADLS_ACUITY_SCORE: 15
ADLS_ACUITY_SCORE: 15

## 2020-04-21 ASSESSMENT — MIFFLIN-ST. JEOR: SCORE: 1895.33

## 2020-04-21 NOTE — PLAN OF CARE
A&O x 4. Patient c/o of headache and nausea - pain/discomfort is relieved by Motrin/Tylenol and Compazine.  VSS, on RA. Up with independently in room. BG levels controlled without insulin. Tele: SR. Irritation in groin and left underarm - miconazole powder available in room. Plan for discharge in 1-2 days once pt's symptoms are manageable. Continue to Monitor.

## 2020-04-21 NOTE — PLAN OF CARE
A&O x 4. Patient c/o of headache and nausea - pain/discomfort is relieved by Tylenol and Compazine.  VSS, on RA. Up with independently in room. BG levels controlled without insulin. Tele: SR. Irritation in groin and left underarm - miconazole powder available in room. Plan for discharge in 1-2 days once pt's symptoms are manageable. Continue to Monitor.

## 2020-04-21 NOTE — PROGRESS NOTES
[facility]    Hospitalist Progress Note    Date of Admission:  4/19/2020    Assessment & Plan        Parmjit Hernández is a 64 year old male who presents with fatigue, flank pain, weakness, nausea. Found to have mildly elevated troponin and EKG changes suspicious for NSTEMI     Anterolateral T wave changes  Elevated troponin  After discharge 4/18, patient felt more weak, fatigued. Had episode of diaphoresis along with persistent nausea prompting visit to ED. Trop 0.086 on admit. Known hx of detectable-mildly elevated trop (0.02-0.06, with max noted 0.154 in Jan 2018). EKG with anterolateral t wave flattening. Last TTE July 2019 at [place]: EF 59%, grade 1 diastolic dysfunction, no other abnormalities noted. Hx father and mother both with CAD. Received aspirin in ED and was starting on heparin gtt.  - Admit inpatient  - telemetry monitoring  -Serial troponins remained flat.  No chest pain or shortness of breath.  - echocardiogram showed normal EF, no changes from prior echo in 2017.  - cardiology consulted and did not recommend any changes or additional work-up.  Heparin drip discontinued.  - EKG if chest pain  - nitroglycerin available if chest pain  - Continue PTA 81mg ASA daily  - Continue PTA statin    Pancytopenia  Persistent nausea  Generalized weakness and fatigue  -He also endorses couple weeks of diarrhea that resolved about a week ago.  Now has developed new pancytopenia with lymphopenia.  Did rule out negative for COVID on 4/15.  However cannot rule out the possibility of another viral illness  -We will continue monitoring and recheck blood counts tomorrow.  -Also recheck CMP.  -In case of persistent nausea consider GI consult for further work-up.  -TSH normal.  -Lyrica dose has been reduced.    Right sided flank discomfort  Reproducible on palpation, worsened with twisting of torso  - Likely musculoskeletal  - PRN tylenol and heating pads available     Recent admission 4/17 for Dizziness,  Hypotension  Acute kidney injury and lactic acidosis likely secondary to hypotensive insult  Cr baseline near 1-1.2, gordon to 2.09 prompting 4/17 admission. Reported dizziness at home, took extra dose of lisinopril resulting in hypotension and subsequent KWABENA, lactic acidosis due to hypovolemia and hypotensive insult. Resolved quickly with IVF.   - Instructed to hold his lisinopril and hydrochlorothiazide on discharge until follow up with PCP (he has been compliant with this)     Diarrhea presumed from opioid withdrawal - resolved  COVID negative 4/15  Reported 10-day history of profuse watery diarrhea, 12-15 times per day, no blood or mucus noted which prompted his admission 4/15. Thought that his diarrhea was secondary to opioid withdrawal as his intrathecal pump needed refill     Chronic abdominal pain  Narcotic dependence  Chronic abdominal pain, has an intrathecal pump which was filled during recent admit 4/15-4/17.      Questionable splenic infarct versus hemangioma on CT scan  Lesion was also seen on multiple CT scans in 2018 and was not seen in the most recent CT so most likely hemangioma  -Heme-onc consultation during 4/15 admit, agreed most likely hemangioma and recommended follow up imaging (abdominal US) in 6mo.      Other cirrhosis of liver, possibly from vences   New information for the patient during 4/15-4/17 admit  - Follow up with [facility] as outpatient     Hyperlipidemia LDL goal <100  Lipid profile checked 2/24-- with total cholesterol 163, HDL 32 and .  - PTA atorvastatin 10mg, continue     Hypertension goal BP (blood pressure) < 140/90  PTA lisinopril 20mg daily and hydrochlorothiazide 25mg daily  - Continue to hold these medications.     Bipolar I disorder  PTA depakote 1000mg daily. Level 4/18 was 31.   - Continue PTA depakote     Other amyloidosis  He is followed by hematology Dr. Romeo status post peripheral stem cell transplant     Type 2 diabetes mellitus without complication, without  long-term current use of insulin  PTA glipizide, metformin-- on hold  - moderate resistance sliding scale insulin  -  mod carb diet     Chronic anemia  Appears to be close to baseline with hgb 11.1 on admission, now hemoglobin has dropped and he is pancytopenic.  Monitor.     Restless legs syndrome (RLS)  Morbid obesity     DVT Prophylaxis: heparin gtt  Code Status: Full Code  Expected discharge: 24-72 hours, recommended to prior living arrangement once symptoms improve.          Jessenia Maher MD  Text Page (7am - 6pm, M-F)    Interval History   Stable overnight.  No fevers.  However continues to have nausea.  No vomiting.  Also seems to have right flank pain located near the sacroiliac joint area.  This pain is better but nausea and fatigue continue.    -Data reviewed today: I reviewed all new labs and imaging results over the last 24 hours. I personally reviewed CXR with result as noted above    Physical Exam   Temp: 97.9  F (36.6  C) Temp src: Oral BP: (!) 128/90   Heart Rate: 82 Resp: 18 SpO2: 92 % O2 Device: None (Room air)    Vitals:    04/19/20 2204 04/20/20 0000 04/21/20 0500   Weight: 108.9 kg (240 lb) 108.9 kg (240 lb) 114.2 kg (251 lb 11.2 oz)     Vital Signs with Ranges  Temp:  [97.6  F (36.4  C)-98.1  F (36.7  C)] 97.9  F (36.6  C)  Heart Rate:  [75-82] 82  Resp:  [16-18] 18  BP: ()/(59-90) 128/90  SpO2:  [91 %-98 %] 92 %  I/O last 3 completed shifts:  In: 600 [P.O.:600]  Out: 2250 [Urine:2250]    Constitutional: Alert, appears comfortable, in no acute distress  Respiratory: Non labored breathing, clear to auscultation bilaterally, no crackles or wheezes  Cardiovascular: Heart sounds regular rate and rhythm, no murmurs, mild leg edema  GI: Abdomen is soft, non distended, non tender. Normal BS  Skin/Integumen: no rashes, no pressure sores  Neuro: alert, converses appropriately, moving all extremities, fluent speech, no facial asymmetry  Psych: Appears anxious    Medications     - MEDICATION  INSTRUCTIONS -         aspirin  81 mg Oral Daily     atorvastatin  10 mg Oral QPM     divalproex sodium delayed-release  1,000 mg Oral Daily     insulin aspart  1-7 Units Subcutaneous TID AC     insulin aspart  1-5 Units Subcutaneous At Bedtime     lurasidone  40 mg Oral At Bedtime     pregabalin  75 mg Oral 4x Daily     sennosides  1-2 tablet Oral BID     sodium chloride (PF)  3 mL Intracatheter Q8H     tamsulosin  0.8 mg Oral Daily       Data   Recent Labs   Lab 04/21/20  0545 04/20/20  1024 04/20/20  0645 04/20/20  0244 04/19/20  2231  04/17/20 2009   WBC 2.5*  --  4.5  --  6.7   < > 9.2   HGB 9.9*  --  11.1*  --  11.1*   < > 11.5*   MCV 98  --  100  --  98   < > 97   PLT 97*  --  121*  --  109*   < > 164   INR  --   --   --   --   --   --  1.18*     --  139  --  139   < > 135   POTASSIUM 4.0  --  3.9  --  3.7   < > 4.1   CHLORIDE 110*  --  110*  --  108   < > 103   CO2 26  --  22  --  24   < > 23   BUN 9  --  14  --  11   < > 23   CR 0.91  --  0.96  --  0.96   < > 2.09*   ANIONGAP 5  --  7  --  7   < > 9   LUIS 8.3*  --  8.2*  --  8.3*   < > 8.4*   *  --  86  --  145*   < > 101*   ALBUMIN  --   --   --   --  2.7*  --  3.0*   PROTTOTAL  --   --   --   --  5.7*  --  5.5*   BILITOTAL  --   --   --   --  0.5  --  0.6   ALKPHOS  --   --   --   --  68  --  72   ALT  --   --   --   --  29  --  43   AST  --   --   --   --  16  --  14   LIPASE  --   --   --   --  110  --  249   TROPI  --  0.065* 0.075* 0.077* 0.086*  --  0.040    < > = values in this interval not displayed.       Imaging  No results found for this or any previous visit (from the past 24 hour(s)).

## 2020-04-22 LAB
ALBUMIN SERPL-MCNC: 2.6 G/DL (ref 3.4–5)
ALBUMIN UR-MCNC: ABNORMAL MG/DL
ALP SERPL-CCNC: 56 U/L (ref 40–150)
ALT SERPL W P-5'-P-CCNC: 21 U/L (ref 0–70)
ANION GAP SERPL CALCULATED.3IONS-SCNC: 3 MMOL/L (ref 3–14)
APPEARANCE UR: CLEAR
AST SERPL W P-5'-P-CCNC: 9 U/L (ref 0–45)
BASOPHILS # BLD AUTO: 0 10E9/L (ref 0–0.2)
BASOPHILS NFR BLD AUTO: 0.5 %
BILIRUB SERPL-MCNC: 0.4 MG/DL (ref 0.2–1.3)
BILIRUB UR QL STRIP: ABNORMAL
BUN SERPL-MCNC: 9 MG/DL (ref 7–30)
CALCIUM SERPL-MCNC: 8.6 MG/DL (ref 8.5–10.1)
CHLORIDE SERPL-SCNC: 111 MMOL/L (ref 94–109)
CO2 SERPL-SCNC: 28 MMOL/L (ref 20–32)
COLOR UR AUTO: YELLOW
CREAT SERPL-MCNC: 0.98 MG/DL (ref 0.66–1.25)
DIFFERENTIAL METHOD BLD: ABNORMAL
EOSINOPHIL # BLD AUTO: 0.1 10E9/L (ref 0–0.7)
EOSINOPHIL NFR BLD AUTO: 4.2 %
ERYTHROCYTE [DISTWIDTH] IN BLOOD BY AUTOMATED COUNT: 13.8 % (ref 10–15)
GFR SERPL CREATININE-BSD FRML MDRD: 82 ML/MIN/{1.73_M2}
GLUCOSE BLDC GLUCOMTR-MCNC: 105 MG/DL (ref 70–99)
GLUCOSE BLDC GLUCOMTR-MCNC: 117 MG/DL (ref 70–99)
GLUCOSE BLDC GLUCOMTR-MCNC: 129 MG/DL (ref 70–99)
GLUCOSE BLDC GLUCOMTR-MCNC: 139 MG/DL (ref 70–99)
GLUCOSE SERPL-MCNC: 101 MG/DL (ref 70–99)
GLUCOSE UR STRIP-MCNC: NEGATIVE MG/DL
HCT VFR BLD AUTO: 29.5 % (ref 40–53)
HGB BLD-MCNC: 10 G/DL (ref 13.3–17.7)
HGB UR QL STRIP: NEGATIVE
IMM GRANULOCYTES # BLD: 0 10E9/L (ref 0–0.4)
IMM GRANULOCYTES NFR BLD: 0 %
KETONES UR STRIP-MCNC: NEGATIVE MG/DL
LEUKOCYTE ESTERASE UR QL STRIP: NEGATIVE
LYMPHOCYTES # BLD AUTO: 0.3 10E9/L (ref 0.8–5.3)
LYMPHOCYTES NFR BLD AUTO: 16 %
MAGNESIUM SERPL-MCNC: 1.8 MG/DL (ref 1.6–2.3)
MCH RBC QN AUTO: 33.4 PG (ref 26.5–33)
MCHC RBC AUTO-ENTMCNC: 33.9 G/DL (ref 31.5–36.5)
MCV RBC AUTO: 99 FL (ref 78–100)
MONOCYTES # BLD AUTO: 0.3 10E9/L (ref 0–1.3)
MONOCYTES NFR BLD AUTO: 14.6 %
NEUTROPHILS # BLD AUTO: 1.4 10E9/L (ref 1.6–8.3)
NEUTROPHILS NFR BLD AUTO: 64.7 %
NITRATE UR QL: NEGATIVE
NRBC # BLD AUTO: 0 10*3/UL
NRBC BLD AUTO-RTO: 0 /100
PH UR STRIP: 6 PH (ref 5–7)
PHOSPHATE SERPL-MCNC: 3.3 MG/DL (ref 2.5–4.5)
PLATELET # BLD AUTO: 91 10E9/L (ref 150–450)
POTASSIUM SERPL-SCNC: 4.1 MMOL/L (ref 3.4–5.3)
PROT SERPL-MCNC: 5.3 G/DL (ref 6.8–8.8)
RBC # BLD AUTO: 2.99 10E12/L (ref 4.4–5.9)
RBC #/AREA URNS AUTO: 0 /HPF (ref 0–2)
SODIUM SERPL-SCNC: 142 MMOL/L (ref 133–144)
SOURCE: ABNORMAL
SP GR UR STRIP: 1.02 (ref 1–1.03)
UROBILINOGEN UR STRIP-MCNC: ABNORMAL MG/DL (ref 0–2)
WBC # BLD AUTO: 2.1 10E9/L (ref 4–11)
WBC #/AREA URNS AUTO: <1 /HPF (ref 0–5)

## 2020-04-22 PROCEDURE — 80053 COMPREHEN METABOLIC PANEL: CPT | Performed by: HOSPITALIST

## 2020-04-22 PROCEDURE — 99232 SBSQ HOSP IP/OBS MODERATE 35: CPT | Performed by: HOSPITALIST

## 2020-04-22 PROCEDURE — 83735 ASSAY OF MAGNESIUM: CPT | Performed by: HOSPITALIST

## 2020-04-22 PROCEDURE — 36415 COLL VENOUS BLD VENIPUNCTURE: CPT | Performed by: HOSPITALIST

## 2020-04-22 PROCEDURE — 85025 COMPLETE CBC W/AUTO DIFF WBC: CPT | Performed by: HOSPITALIST

## 2020-04-22 PROCEDURE — 25000132 ZZH RX MED GY IP 250 OP 250 PS 637: Performed by: HOSPITALIST

## 2020-04-22 PROCEDURE — 21000001 ZZH R&B HEART CARE

## 2020-04-22 PROCEDURE — 25000128 H RX IP 250 OP 636: Performed by: HOSPITALIST

## 2020-04-22 PROCEDURE — 84100 ASSAY OF PHOSPHORUS: CPT | Performed by: HOSPITALIST

## 2020-04-22 PROCEDURE — 00000146 ZZHCL STATISTIC GLUCOSE BY METER IP

## 2020-04-22 RX ORDER — FUROSEMIDE 20 MG
20 TABLET ORAL ONCE
Status: COMPLETED | OUTPATIENT
Start: 2020-04-22 | End: 2020-04-22

## 2020-04-22 RX ORDER — METOCLOPRAMIDE 5 MG/1
10 TABLET ORAL
Status: DISCONTINUED | OUTPATIENT
Start: 2020-04-22 | End: 2020-04-23 | Stop reason: HOSPADM

## 2020-04-22 RX ADMIN — METOCLOPRAMIDE HYDROCHLORIDE 10 MG: 5 TABLET ORAL at 12:17

## 2020-04-22 RX ADMIN — ACETAMINOPHEN 650 MG: 325 TABLET, FILM COATED ORAL at 06:53

## 2020-04-22 RX ADMIN — ACETAMINOPHEN 650 MG: 325 TABLET, FILM COATED ORAL at 03:01

## 2020-04-22 RX ADMIN — PREGABALIN 75 MG: 75 CAPSULE ORAL at 21:10

## 2020-04-22 RX ADMIN — TAMSULOSIN HYDROCHLORIDE 0.8 MG: 0.4 CAPSULE ORAL at 09:38

## 2020-04-22 RX ADMIN — PREGABALIN 75 MG: 75 CAPSULE ORAL at 09:36

## 2020-04-22 RX ADMIN — ACETAMINOPHEN 650 MG: 325 TABLET, FILM COATED ORAL at 21:18

## 2020-04-22 RX ADMIN — LURASIDONE HYDROCHLORIDE 40 MG: 40 TABLET, FILM COATED ORAL at 21:10

## 2020-04-22 RX ADMIN — FUROSEMIDE 20 MG: 20 TABLET ORAL at 12:17

## 2020-04-22 RX ADMIN — PROCHLORPERAZINE EDISYLATE 10 MG: 5 INJECTION INTRAMUSCULAR; INTRAVENOUS at 11:16

## 2020-04-22 RX ADMIN — ACETAMINOPHEN 650 MG: 325 TABLET, FILM COATED ORAL at 17:15

## 2020-04-22 RX ADMIN — PREGABALIN 75 MG: 75 CAPSULE ORAL at 16:53

## 2020-04-22 RX ADMIN — IBUPROFEN 200 MG: 200 TABLET, FILM COATED ORAL at 03:01

## 2020-04-22 RX ADMIN — SENNOSIDES 1 TABLET: 8.6 TABLET, FILM COATED ORAL at 09:38

## 2020-04-22 RX ADMIN — SENNOSIDES 1 TABLET: 8.6 TABLET, FILM COATED ORAL at 21:09

## 2020-04-22 RX ADMIN — ONDANSETRON 4 MG: 2 INJECTION INTRAMUSCULAR; INTRAVENOUS at 05:29

## 2020-04-22 RX ADMIN — METOCLOPRAMIDE HYDROCHLORIDE 10 MG: 5 TABLET ORAL at 16:53

## 2020-04-22 RX ADMIN — ATORVASTATIN CALCIUM 10 MG: 10 TABLET, FILM COATED ORAL at 21:10

## 2020-04-22 RX ADMIN — PROCHLORPERAZINE MALEATE 10 MG: 5 TABLET ORAL at 21:09

## 2020-04-22 RX ADMIN — ASPIRIN 81 MG: 81 TABLET, DELAYED RELEASE ORAL at 09:36

## 2020-04-22 RX ADMIN — PROCHLORPERAZINE EDISYLATE 10 MG: 5 INJECTION INTRAMUSCULAR; INTRAVENOUS at 03:05

## 2020-04-22 RX ADMIN — DIVALPROEX SODIUM 1000 MG: 500 TABLET, DELAYED RELEASE ORAL at 09:37

## 2020-04-22 ASSESSMENT — ACTIVITIES OF DAILY LIVING (ADL)
ADLS_ACUITY_SCORE: 15
ADLS_ACUITY_SCORE: 14
ADLS_ACUITY_SCORE: 15
ADLS_ACUITY_SCORE: 16

## 2020-04-22 ASSESSMENT — MIFFLIN-ST. JEOR: SCORE: 1888.53

## 2020-04-22 NOTE — PROVIDER NOTIFICATION
MD Notification    Notified Person: MD    Notified Person Name:  Shilo    Notification Date/Time: 4/22/2020 3861    Notification Interaction: Sent text page    Purpose of Notification: Patient requests narcotics, specifically    Orders Received:    Comments:

## 2020-04-22 NOTE — PLAN OF CARE
DATE & TIME: 4/22/2020 NOC   Cognitive Concerns/ Orientation : Alert and Oriented x4   BEHAVIOR & AGGRESSION TOOL COLOR: Green   CIWA SCORE: NA   ABNL VS/O2: VSS on RA   MOBILITY: Ind   PAIN MANAGMENT: Denies   DIET: Mod Carb  BOWEL/BLADDER: Continent   ABNL LAB/BG:   DRAIN/DEVICES: PIV SL   TELEMETRY RHYTHM: NS   SKIN: Bruised rosaura lower extremities, rash to groin and left armpit.   TESTS/PROCEDURES: NA  D/C DAY/GOALS/PLACE: Likely today.   OTHER IMPORTANT INFO: nausea continues, compazine and zofran given. Tylenol and ibuprofen given for headache.

## 2020-04-22 NOTE — PLAN OF CARE
DATE & TIME: 4/21/2020    Cognitive Concerns/ Orientation : Alert and Oriented x4   BEHAVIOR & AGGRESSION TOOL COLOR: Green   CIWA SCORE: NA   ABNL VS/O2: VSS on RA   MOBILITY: Ind   PAIN MANAGMENT: Denies, had a headache earlier, but denies it now.   DIET: Mod Carb  BOWEL/BLADDER: Continent   ABNL LAB/BG:   DRAIN/DEVICES: PIV SL   TELEMETRY RHYTHM: NS with BBB  SKIN: Bruised rosaura lower extremities, rash to groin and left armpit.   TESTS/PROCEDURES: NA  D/C DAY/GOALS/PLACE: Likely tomorrow.   OTHER IMPORTANT INFO: NA

## 2020-04-22 NOTE — PROGRESS NOTES
[facility]    Hospitalist Progress Note    Date of Admission:  4/19/2020    Assessment & Plan        Parmjit Hernández is a 64 year old male who presents with fatigue, flank pain, weakness, nausea. Found to have mildly elevated troponin and EKG changes suspicious for NSTEMI     Anterolateral T wave changes  Elevated troponin  After discharge 4/18, patient felt more weak, fatigued. Had episode of diaphoresis along with persistent nausea prompting visit to ED. Trop 0.086 on admit. Known hx of detectable-mildly elevated trop (0.02-0.06, with max noted 0.154 in Jan 2018). EKG with anterolateral t wave flattening. Last TTE July 2019 at [place]: EF 59%, grade 1 diastolic dysfunction, no other abnormalities noted. Hx father and mother both with CAD. Received aspirin in ED and was starting on heparin gtt.  - Admit inpatient  - telemetry monitoring  -Serial troponins remained flat.  No chest pain or shortness of breath.  - echocardiogram showed normal EF, no changes from prior echo in 2017.  - cardiology consulted and did not recommend any changes or additional work-up.  Heparin drip discontinued.  - EKG if chest pain  - nitroglycerin available if chest pain  - Continue PTA 81mg ASA daily  - Continue PTA statin  -Patient remained with stable vitals and chest pain-free throughout her stay.    Pancytopenia  Persistent nausea  Generalized weakness and fatigue  -He also endorses couple weeks of diarrhea that resolved about a week ago.  Now has developed new pancytopenia with lymphopenia.  Did rule out negative for COVID on 4/15.  However cannot rule out the possibility of another viral illness  -Continue to monitor blood counts.  -Recheck CMP remains normal.  -In case of persistent nausea consider GI consult for further work-up.  We will trial scheduled Reglan with meals and PRN Compazine.  Zofran does not do much for him.  -TSH normal.  -Lyrica dose has been reduced.    Right sided flank discomfort  Reproducible on  palpation, worsened with twisting of torso  - Likely musculoskeletal  - PRN tylenol and heating pads available     Recent admission 4/17 for Dizziness, Hypotension  Acute kidney injury and lactic acidosis likely secondary to hypotensive insult  Cr baseline near 1-1.2, gordon to 2.09 prompting 4/17 admission. Reported dizziness at home, took extra dose of lisinopril resulting in hypotension and subsequent KWABENA, lactic acidosis due to hypovolemia and hypotensive insult. Resolved quickly with IVF.   - Instructed to hold his lisinopril and hydrochlorothiazide on discharge until follow up with PCP (he has been compliant with this)  -We will give a dose of oral Lasix today as he appears slightly edematous.     Diarrhea presumed from opioid withdrawal - resolved  COVID negative 4/15  Reported 10-day history of profuse watery diarrhea, 12-15 times per day, no blood or mucus noted which prompted his admission 4/15. Thought that his diarrhea was secondary to opioid withdrawal as his intrathecal pump needed refill     Chronic abdominal pain  Narcotic dependence  Chronic abdominal pain, has an intrathecal pump which was filled during recent admit 4/15-4/17.      Questionable splenic infarct versus hemangioma on CT scan  Lesion was also seen on multiple CT scans in 2018 and was not seen in the most recent CT so most likely hemangioma  -Heme-onc consultation during 4/15 admit, agreed most likely hemangioma and recommended follow up imaging (abdominal US) in 6mo.      Other cirrhosis of liver, possibly from vences   New information for the patient during 4/15-4/17 admit  - Follow up with [facility] as outpatient     Hyperlipidemia LDL goal <100  Lipid profile checked 2/24-- with total cholesterol 163, HDL 32 and .  - PTA atorvastatin 10mg, continue     Hypertension goal BP (blood pressure) < 140/90  PTA lisinopril 20mg daily and hydrochlorothiazide 25mg daily  - Continue to hold these medications.     Bipolar I disorder  PTA  depakote 1000mg daily. Level 4/18 was 31.   - Continue PTA depakote     Other amyloidosis  He is followed by hematology Dr. Romeo status post peripheral stem cell transplant     Type 2 diabetes mellitus without complication, without long-term current use of insulin  PTA glipizide, metformin-- on hold  - moderate resistance sliding scale insulin  -  mod carb diet     Chronic anemia  Appears to be close to baseline with hgb 11.1 on admission, now hemoglobin has dropped and he is pancytopenic.  Monitor.     Restless legs syndrome (RLS)  Morbid obesity     DVT Prophylaxis:  PCD  Code Status: Full Code  Expected discharge:  Possibly tomorrow, recommended to prior living arrangement once symptoms improve.          Jessenia Maher MD  Text Page (7am - 6pm, M-F)    Interval History   Stable overnight.  However he does not really feel any different from yesterday.  Continues to feel weak, tired and persistently nauseous.  He tells me that he has had no vomiting and does not feel like vomiting but describes a sensation of nausea.  Is able to keep food down.  No fevers, shortness of breath or cough.  Had a normal bowel movement yesterday.    -Data reviewed today: I reviewed all new labs and imaging results over the last 24 hours. I personally reviewed CXR with result as noted above    Physical Exam   Temp: 97.9  F (36.6  C) Temp src: Oral BP: 107/54 Pulse: 76 Heart Rate: 66 Resp: 18 SpO2: 93 % O2 Device: None (Room air)    Vitals:    04/20/20 0000 04/21/20 0500 04/22/20 0521   Weight: 108.9 kg (240 lb) 114.2 kg (251 lb 11.2 oz) 113.5 kg (250 lb 3.2 oz)     Vital Signs with Ranges  Temp:  [97.6  F (36.4  C)-98.1  F (36.7  C)] 97.9  F (36.6  C)  Pulse:  [76] 76  Heart Rate:  [66-78] 66  Resp:  [16-20] 18  BP: (107-153)/(54-86) 107/54  SpO2:  [91 %-95 %] 93 %  I/O last 3 completed shifts:  In: 490 [P.O.:490]  Out: 1075 [Urine:1075]    Constitutional: Alert, appears comfortable, in no acute distress  Respiratory: Non labored  breathing, clear to auscultation bilaterally, no crackles or wheezes  Cardiovascular: Heart sounds regular rate and rhythm, no murmurs, mild leg edema  GI: Abdomen is soft, non distended, non tender. Normal BS  Skin/Integumen: Some redness in pretibial areas, mild leg swelling  Neuro: alert, converses appropriately, moving all extremities, fluent speech, no facial asymmetry  Psych: Calm, flat affect    Medications     - MEDICATION INSTRUCTIONS -         aspirin  81 mg Oral Daily     atorvastatin  10 mg Oral QPM     divalproex sodium delayed-release  1,000 mg Oral Daily     insulin aspart  1-7 Units Subcutaneous TID AC     insulin aspart  1-5 Units Subcutaneous At Bedtime     lurasidone  40 mg Oral At Bedtime     metoclopramide  10 mg Oral TID AC     pregabalin  75 mg Oral TID     sennosides  1-2 tablet Oral BID     sodium chloride (PF)  3 mL Intracatheter Q8H     tamsulosin  0.8 mg Oral Daily       Data   Recent Labs   Lab 04/22/20  0636 04/21/20  0545 04/20/20  1024 04/20/20  0645 04/20/20  0244 04/19/20  2231  04/17/20 2009   WBC 2.1* 2.5*  --  4.5  --  6.7   < > 9.2   HGB 10.0* 9.9*  --  11.1*  --  11.1*   < > 11.5*   MCV 99 98  --  100  --  98   < > 97   PLT 91* 97*  --  121*  --  109*   < > 164   INR  --   --   --   --   --   --   --  1.18*    141  --  139  --  139   < > 135   POTASSIUM 4.1 4.0  --  3.9  --  3.7   < > 4.1   CHLORIDE 111* 110*  --  110*  --  108   < > 103   CO2 28 26  --  22  --  24   < > 23   BUN 9 9  --  14  --  11   < > 23   CR 0.98 0.91  --  0.96  --  0.96   < > 2.09*   ANIONGAP 3 5  --  7  --  7   < > 9   LUIS 8.6 8.3*  --  8.2*  --  8.3*   < > 8.4*   * 119*  --  86  --  145*   < > 101*   ALBUMIN 2.6*  --   --   --   --  2.7*  --  3.0*   PROTTOTAL 5.3*  --   --   --   --  5.7*  --  5.5*   BILITOTAL 0.4  --   --   --   --  0.5  --  0.6   ALKPHOS 56  --   --   --   --  68  --  72   ALT 21  --   --   --   --  29  --  43   AST 9  --   --   --   --  16  --  14   LIPASE  --   --   --    --   --  110  --  249   TROPI  --   --  0.065* 0.075* 0.077* 0.086*  --  0.040    < > = values in this interval not displayed.       Imaging  No results found for this or any previous visit (from the past 24 hour(s)).

## 2020-04-23 VITALS
DIASTOLIC BLOOD PRESSURE: 100 MMHG | RESPIRATION RATE: 18 BRPM | HEIGHT: 67 IN | SYSTOLIC BLOOD PRESSURE: 158 MMHG | TEMPERATURE: 98.1 F | BODY MASS INDEX: 38.55 KG/M2 | OXYGEN SATURATION: 96 % | WEIGHT: 245.6 LBS | HEART RATE: 76 BPM

## 2020-04-23 LAB
BASOPHILS # BLD AUTO: 0 10E9/L (ref 0–0.2)
BASOPHILS NFR BLD AUTO: 0.9 %
DIFFERENTIAL METHOD BLD: ABNORMAL
EOSINOPHIL # BLD AUTO: 0.1 10E9/L (ref 0–0.7)
EOSINOPHIL NFR BLD AUTO: 4.8 %
ERYTHROCYTE [DISTWIDTH] IN BLOOD BY AUTOMATED COUNT: 13.7 % (ref 10–15)
GLUCOSE BLDC GLUCOMTR-MCNC: 126 MG/DL (ref 70–99)
HCT VFR BLD AUTO: 29.7 % (ref 40–53)
HGB BLD-MCNC: 10.2 G/DL (ref 13.3–17.7)
IMM GRANULOCYTES # BLD: 0 10E9/L (ref 0–0.4)
IMM GRANULOCYTES NFR BLD: 0.4 %
LYMPHOCYTES # BLD AUTO: 0.5 10E9/L (ref 0.8–5.3)
LYMPHOCYTES NFR BLD AUTO: 21 %
MCH RBC QN AUTO: 33.8 PG (ref 26.5–33)
MCHC RBC AUTO-ENTMCNC: 34.3 G/DL (ref 31.5–36.5)
MCV RBC AUTO: 98 FL (ref 78–100)
MONOCYTES # BLD AUTO: 0.4 10E9/L (ref 0–1.3)
MONOCYTES NFR BLD AUTO: 15.7 %
NEUTROPHILS # BLD AUTO: 1.3 10E9/L (ref 1.6–8.3)
NEUTROPHILS NFR BLD AUTO: 57.2 %
NRBC # BLD AUTO: 0 10*3/UL
NRBC BLD AUTO-RTO: 0 /100
PLATELET # BLD AUTO: 98 10E9/L (ref 150–450)
RBC # BLD AUTO: 3.02 10E12/L (ref 4.4–5.9)
WBC # BLD AUTO: 2.3 10E9/L (ref 4–11)

## 2020-04-23 PROCEDURE — 99239 HOSP IP/OBS DSCHRG MGMT >30: CPT | Performed by: HOSPITALIST

## 2020-04-23 PROCEDURE — 85025 COMPLETE CBC W/AUTO DIFF WBC: CPT | Performed by: HOSPITALIST

## 2020-04-23 PROCEDURE — 00000146 ZZHCL STATISTIC GLUCOSE BY METER IP

## 2020-04-23 PROCEDURE — 25000128 H RX IP 250 OP 636: Performed by: HOSPITALIST

## 2020-04-23 PROCEDURE — 25000132 ZZH RX MED GY IP 250 OP 250 PS 637: Performed by: HOSPITALIST

## 2020-04-23 PROCEDURE — 36415 COLL VENOUS BLD VENIPUNCTURE: CPT | Performed by: HOSPITALIST

## 2020-04-23 PROCEDURE — 40000893 ZZH STATISTIC PT IP EVAL DEFER

## 2020-04-23 RX ORDER — PROCHLORPERAZINE MALEATE 10 MG
10 TABLET ORAL EVERY 6 HOURS PRN
Qty: 20 TABLET | Refills: 0 | Status: SHIPPED | OUTPATIENT
Start: 2020-04-23 | End: 2020-06-15

## 2020-04-23 RX ORDER — METOCLOPRAMIDE 10 MG/1
10 TABLET ORAL 3 TIMES DAILY PRN
Qty: 20 TABLET | Refills: 0 | Status: SHIPPED | OUTPATIENT
Start: 2020-04-23 | End: 2020-06-15

## 2020-04-23 RX ORDER — HYDROCODONE BITARTRATE AND ACETAMINOPHEN 5; 325 MG/1; MG/1
1 TABLET ORAL EVERY 4 HOURS PRN
Qty: 15 TABLET | Refills: 0 | Status: SHIPPED | OUTPATIENT
Start: 2020-04-23 | End: 2020-06-15

## 2020-04-23 RX ADMIN — PREGABALIN 75 MG: 75 CAPSULE ORAL at 16:24

## 2020-04-23 RX ADMIN — PROCHLORPERAZINE EDISYLATE 10 MG: 5 INJECTION INTRAMUSCULAR; INTRAVENOUS at 05:04

## 2020-04-23 RX ADMIN — TAMSULOSIN HYDROCHLORIDE 0.8 MG: 0.4 CAPSULE ORAL at 09:25

## 2020-04-23 RX ADMIN — IBUPROFEN 200 MG: 200 TABLET, FILM COATED ORAL at 05:04

## 2020-04-23 RX ADMIN — METOCLOPRAMIDE HYDROCHLORIDE 10 MG: 5 TABLET ORAL at 07:06

## 2020-04-23 RX ADMIN — ASPIRIN 81 MG: 81 TABLET, DELAYED RELEASE ORAL at 09:25

## 2020-04-23 RX ADMIN — METOCLOPRAMIDE HYDROCHLORIDE 10 MG: 5 TABLET ORAL at 16:24

## 2020-04-23 RX ADMIN — DIVALPROEX SODIUM 1000 MG: 500 TABLET, DELAYED RELEASE ORAL at 09:25

## 2020-04-23 RX ADMIN — ACETAMINOPHEN 650 MG: 325 TABLET, FILM COATED ORAL at 16:24

## 2020-04-23 RX ADMIN — IBUPROFEN 200 MG: 200 TABLET, FILM COATED ORAL at 16:24

## 2020-04-23 RX ADMIN — PREGABALIN 75 MG: 75 CAPSULE ORAL at 09:25

## 2020-04-23 RX ADMIN — ACETAMINOPHEN 650 MG: 325 TABLET, FILM COATED ORAL at 05:04

## 2020-04-23 RX ADMIN — SENNOSIDES 2 TABLET: 8.6 TABLET, FILM COATED ORAL at 09:25

## 2020-04-23 ASSESSMENT — ACTIVITIES OF DAILY LIVING (ADL)
ADLS_ACUITY_SCORE: 14
ADLS_ACUITY_SCORE: 13
ADLS_ACUITY_SCORE: 14
ADLS_ACUITY_SCORE: 13
ADLS_ACUITY_SCORE: 14

## 2020-04-23 ASSESSMENT — MIFFLIN-ST. JEOR: SCORE: 1867.66

## 2020-04-23 NOTE — PROGRESS NOTES
Per bedside RN, patient requesting writer to arrange for transportation.  Met with patient and patient refuses to use taxi or uber.  Patient requesting [employer] W/C ride.  Writer explained cost: approximately $75 flat fee plus $5 per mile and that it would not be covered by insurance.  Patient aware and in agreement.  W/C ride arranged for 6pm via [employer] [facility].    Demetria Peraza RN  Care Coordinator  [facility]  [phone]

## 2020-04-23 NOTE — PLAN OF CARE
PT:  Discharge Planner PT   Patient plan for discharge: Return home  Current status: Orders received, chart reviewed, spoke with patient. Pt admitted with weakness, nausea, abnormal EKG and KWABENA. Pt was just admitted on 4/17-18 with dizziness/hypotension. Pt has been up independently in his room. States his biggest complaint is nausea. Denies any difficulty ambulating. Pt issued standing LE HEP to perform independently and educated to walk with nursing in the halls for activity. Pt in agreement.  Barriers to return to prior living situation: None  Recommendations for discharge: Return home with OPPT  Rationale for recommendations: Pt would benefit from OPPT for general strengthening once discharged. Pt has no skilled IP PT needs at this time, is to perform ambulation and HEP while admitted.       Entered by: Emily Santana 04/23/2020 10:10 AM

## 2020-04-23 NOTE — PROGRESS NOTES
VSS. Pt denies any CP or SOB. Discharge instructions, medication indications/side effects, and follow up instructions discussed w/ patient. Handouts given. All questions answered. All pt belongings are accounted for and sent home w/ pt. Pt left floor via w/c and was driven home by [employer].    Kenny Rodgers RN on 4/23/2020 at 5:55 PM

## 2020-04-23 NOTE — PROGRESS NOTES
SPIRITUAL HEALTH SERVICES Progress Note  [facility]    Initiated phone visit due to LOS.  Pt declined visit at this time, but will request further support if needs arise.  SH remains available for f/u upon pt request.      Noé Aguilera  Chaplain Resident

## 2020-04-23 NOTE — PLAN OF CARE
Pt stable over night, VSS, no change in current condition.  Pt continues to c/o ABD pain and nausea that doesn't respond to treatment.  Pt slept well and is very withdrawn with nursing staff.  No new issues noted.

## 2020-04-23 NOTE — PLAN OF CARE
DATE & TIME: 4/22/2020    Cognitive Concerns/ Orientation : A+Ox4   BEHAVIOR & AGGRESSION TOOL COLOR: Green; calm & cooperative  CIWA SCORE: NA   ABNL VS/O2: 2L for comfort  MOBILITY: Independent  PAIN MANAGMENT: Ibuprofen, acetaminophen; no opioids  DIET: Mod Carb  BOWEL/BLADDER: Diarrhea from opoid withdrawal  ABNL LAB/BG: NA  DRAIN/DEVICES: None  TELEMETRY RHYTHM: SR w BBB  SKIN: Redness, rashes; legs abdomen  TESTS/PROCEDURES: TBD  D/C DAY/GOALS/PLACE: TBD  OTHER IMPORTANT INFO: NA

## 2020-04-23 NOTE — DISCHARGE SUMMARY
Discharge Summary  Hospitalist    Date of Admission:  4/19/2020  Date of Discharge:  4/23/2020  Discharging Provider: Jessenia Maher MD  Date of Service (when I saw the patient): 04/23/20    Discharge Diagnoses     Anterolateral T wave changes  Elevated troponin  Pancytopenia  Persistent nausea  Generalized weakness and fatigue  Right sided flank discomfort  Diarrhea presumed from opioid withdrawal - resolved  Recent admission 4/17 for Dizziness, Hypotension  Acute kidney injury and lactic acidosis likely secondary to hypotensive insult  Chronic abdominal pain  Narcotic dependence  Bipolar I disorder  Type 2 diabetes mellitus without complication, without long-term current use of insulin    History of Present Illness   Please refer H & P for details.      Hospital Course   Parmjit Hernández is a 64 year old male who presents with fatigue, flank pain, weakness, nausea. Found to have mildly elevated troponin and EKG changes suspicious for NSTEMI     Anterolateral T wave changes  Elevated troponin  After discharge 4/18, patient felt more weak, fatigued. Had episode of diaphoresis along with persistent nausea prompting visit to ED. Trop 0.086 on admit. Known hx of detectable-mildly elevated trop (0.02-0.06, with max noted 0.154 in Jan 2018). EKG with anterolateral t wave flattening. Last TTE July 2019 at [facility]: EF 59%, grade 1 diastolic dysfunction, no other abnormalities noted. Hx father and mother both with CAD. Received aspirin in ED and was starting on heparin gtt.  - Admitted as inpatient  -Serial troponins remained flat.  No chest pain or shortness of breath.  - echocardiogram showed normal EF, no changes from prior echo in 2017.  - cardiology consulted and did not recommend any changes or additional work-up.  Heparin drip discontinued.  - Continue PTA 81mg ASA daily  - Continue PTA statin  -Patient remained with stable vitals and chest pain-free throughout his stay.     Pancytopenia  Persistent nausea  Generalized  weakness and fatigue  -He also endorses couple weeks of diarrhea that resolved about a week ago.  Now has developed new pancytopenia with lymphopenia.  Did rule out negative for COVID on 4/15.  However cannot rule out the possibility of another viral illness  -Continued to monitor blood counts which remained stable.  -Recheck CMP remains normal.  -TSH normal.  -Lyrica dose was reduced here but his symptoms really did not improve with this.  Lyrica dose resumed at home prior dosing at discharge.  -Despite complaints of nausea, patient had no vomiting and was able to keep all his food down and ate well during his stay here.  On day of discharge he was still complaining of persistent abdominal pain but he was able to ambulate easily and did not appear in any distress.     Right sided flank discomfort  Reproducible on palpation, worsened with twisting of torso  - Likely musculoskeletal  - PRN tylenol and heating pads available    Chronic abdominal pain  Narcotic dependence  Chronic abdominal pain, has an intrathecal pump which was filled during recent admit 4/15-4/17.   Suspect that his symptoms of nausea and continued abdominal pain are related to his chronic pain issues and narcotic dependence.  We did not find any etiology for his symptoms on labs or imaging.  Viral illness is a possibility given his pancytopenia.  At discharge he is provided with a very short supply of Norco as needed till he follows up with his pain clinic provider for management of his chronic pain.     Recent admission 4/17 for Dizziness, Hypotension  Acute kidney injury and lactic acidosis likely secondary to hypotensive insult  Cr baseline near 1-1.2, gordon to 2.09 prompting 4/17 admission. Reported dizziness at home, took extra dose of lisinopril resulting in hypotension and subsequent KWABENA, lactic acidosis due to hypovolemia and hypotensive insult. Resolved quickly with IVF.   - Instructed to hold his lisinopril and hydrochlorothiazide on  discharge until follow up with PCP (he has been compliant with this)  -Got a single dose of PRN oral Lasix during his hospital stay here.     Diarrhea presumed from opioid withdrawal - resolved  COVID negative 4/15  Reported 10-day history of profuse watery diarrhea, 12-15 times per day, no blood or mucus noted which prompted his admission 4/15. Thought that his diarrhea was secondary to opioid withdrawal as his intrathecal pump needed refill.  Did not have any diarrhea during this hospital stay.       Questionable splenic infarct versus hemangioma on CT scan  Lesion was also seen on multiple CT scans in 2018 and was not seen in the most recent CT so most likely hemangioma  -Heme-onc consultation during 4/15 admit, agreed most likely hemangioma and recommended follow up imaging (abdominal US) in 6mo.      Other cirrhosis of liver, possibly from vences   New information for the patient during 4/15-4/17 admit  - Follow up with [facility] as outpatient     Hyperlipidemia LDL goal <100  Lipid profile checked 2/24-- with total cholesterol 163, HDL 32 and .  - PTA atorvastatin 10mg, continue     Hypertension goal BP (blood pressure) < 140/90  PTA lisinopril 20mg daily and hydrochlorothiazide 25mg daily  - Continue to hold these medications.     Bipolar I disorder  PTA depakote 1000mg daily. Level 4/18 was 31.   - Continue PTA depakote     Other amyloidosis  He is followed by hematology Dr. Romeo status post peripheral stem cell transplant     Type 2 diabetes mellitus without complication, without long-term current use of insulin  PTA glipizide, metformin-- on hold  - moderate resistance sliding scale insulin  -  mod carb diet  -Resumed home regimen at discharge.     Chronic anemia  Appears to be close to baseline with hgb 11.1 on admission, now hemoglobin has dropped slightly and he is pancytopenic.  Monitor.     Restless legs syndrome (RLS)  Morbid obesity    Patient is stable at discharge home.  Follow-up with PCP  and pain clinic provider.  Recheck CBC in 1 week.      Jessenia Maher MD, MD      Pending Results   These results will be followed up by Hospitalist team.  Unresulted Labs Ordered in the Past 30 Days of this Admission     No orders found from 3/20/2020 to 4/20/2020.          Code Status   Full Code       Primary Care Physician   Vince Henry    Follow-ups Needed After Discharge   Follow-up Appointments     Follow-up and recommended labs and tests       Follow up with primary care provider, Vince Henry, within 7 days for   hospital follow- up.  No follow up labs or test are needed.  Follow up with your pain clinic provider for chronic pain management.             Physical Exam   Temp: 98.1  F (36.7  C) Temp src: Oral BP: (!) 150/83 Pulse: 76 Heart Rate: 77 Resp: 18 SpO2: 91 % O2 Device: None (Room air)    Vitals:    04/21/20 0500 04/22/20 0521 04/23/20 0634   Weight: 114.2 kg (251 lb 11.2 oz) 113.5 kg (250 lb 3.2 oz) 111.4 kg (245 lb 9.6 oz)     Vital Signs with Ranges  Temp:  [97.7  F (36.5  C)-98.1  F (36.7  C)] 98.1  F (36.7  C)  Pulse:  [76] 76  Heart Rate:  [77-81] 77  Resp:  [18] 18  BP: (138-153)/(79-89) 150/83  SpO2:  [91 %] 91 %  I/O last 3 completed shifts:  In: 960 [P.O.:960]  Out: 1450 [Urine:1450]    Constitutional: Alert, cooperative, no apparent distress, morbidly obese  Respiratory: Non labored breathing  Cardiovascular: Regular rate and rhythm, no murmurs, mild leg edema  GI: Obese, nontender  Skin: No obvious rash  Neuro: Alert, engages in appropriate conversation, fluent speech, moving all extremities, no facial asymmetry  Psych: Appears mildly anxious      Discharge Disposition   Discharged to home  Condition at discharge: Stable    Consultations This Hospital Stay   PHARMACY TO DOSE HEPARIN  CARDIOLOGY IP CONSULT  PHARMACY TO DOSE HEPARIN  PHYSICAL THERAPY ADULT IP CONSULT    Time Spent on this Encounter   I, Jessenia Maher MD, personally saw the patient today and spent greater than 30  minutes discharging this patient.    Discharge Orders      Reason for your hospital stay    You were hospitalized with fatigue, abdominal pain and nausea. No specific issue was identified and these symptoms are more likely due to chronic pain and chronic narcotic use.     Follow-up and recommended labs and tests     Follow up with primary care provider, Vince Henry, within 7 days for hospital follow- up.  No follow up labs or test are needed.  Follow up with your pain clinic provider for chronic pain management.     Activity    Your activity upon discharge: activity as tolerated     When to contact your care team    Call your primary doctor if you have any of the following: worsening fever, vomiting     Full Code     Diet    Follow this diet upon discharge: Orders Placed This Encounter      Moderate Consistent CHO Diet     Discharge Medications   Current Discharge Medication List      START taking these medications    Details   HYDROcodone-acetaminophen (NORCO) 5-325 MG tablet Take 1 tablet by mouth every 4 hours as needed for severe pain  Qty: 15 tablet, Refills: 0    Associated Diagnoses: Flank pain      metoclopramide (REGLAN) 10 MG tablet Take 1 tablet (10 mg) by mouth 3 times daily as needed  Qty: 20 tablet, Refills: 0    Associated Diagnoses: Nausea      prochlorperazine (COMPAZINE) 10 MG tablet Take 1 tablet (10 mg) by mouth every 6 hours as needed for vomiting  Qty: 20 tablet, Refills: 0    Associated Diagnoses: Nausea         CONTINUE these medications which have NOT CHANGED    Details   aspirin (ASPIRIN LOW DOSE) 81 MG tablet Take 1 tablet (81 mg) by mouth daily  Qty: 30 tablet, Refills: 3    Associated Diagnoses: Hyperlipidemia LDL goal <100; Hypertension goal BP (blood pressure) < 140/90      atorvastatin (LIPITOR) 10 MG tablet TAKE 1 TABLET(10 MG) BY MOUTH DAILY  Qty: 90 tablet, Refills: 1    Associated Diagnoses: Hyperlipidemia LDL goal <100      divalproex (DEPAKOTE) 500 MG EC tablet Take 2  tablets (1,000 mg) by mouth daily  Qty: 10 tablet, Refills: 0    Associated Diagnoses: Hyperlipidemia LDL goal <100      glipiZIDE (GLUCOTROL XL) 2.5 MG 24 hr tablet Take 1 tablet (2.5 mg) by mouth daily  Qty: 30 tablet, Refills: 0      lurasidone (LATUDA) 40 MG TABS tablet Take 40 mg by mouth At Bedtime      metFORMIN (GLUCOPHAGE-XR) 500 MG 24 hr tablet Take 1 tablet (500 mg) by mouth 2 times daily (with meals) Until follow-up with primary doctor and lab recheck  Qty: 360 tablet, Refills: 0    Associated Diagnoses: Type 2 diabetes mellitus with other specified complication, without long-term current use of insulin (H)      modafinil (PROVIGIL) 200 MG tablet Take 200 mg by mouth every morning       Multiple Vitamins-Minerals (SENIOR MULTIVITAMIN PLUS PO) Take 1 tablet by mouth daily      pregabalin (LYRICA) 100 MG capsule Take 1 capsule (100 mg) by mouth 4 times daily  Qty: 360 capsule, Refills: 3    Associated Diagnoses: Peripheral polyneuropathy      sennosides (SENOKOT) 8.6 MG tablet Take one tablet in the morning and 2 tablets in the evening.      tamsulosin (FLOMAX) 0.4 MG capsule Take 0.8 mg by mouth daily   Refills: 3      vitamin C (ASCORBIC ACID) 1000 MG TABS Take 1,000 mg by mouth daily      blood glucose (ACCU-CHEK COMPACT DRUM) test strip Use to test blood sugars 3 to 4 times daily.  Qty: 400 strip, Refills: 1    Associated Diagnoses: Type 2 diabetes mellitus without complication, without long-term current use of insulin (H)      blood glucose (ACCU-CHEK MULTICLIX) lancing device Lancing device to be used with lancets.  Qty: 1 each, Refills: 0    Comments: Okay to subsitute.  Associated Diagnoses: Type 2 diabetes mellitus without complication, without long-term current use of insulin (H)      blood glucose monitoring (SOFTCLIX) lancets USE TO TEST BLOOD SUGAR 3-4 TIMES DAILY OR AS DIRECTED.  Refills: 1      NONFORMULARY by Intrathecal route continuous - + up to 3 boluses daily    Managed by Dr Villalta  [facility]     Medications in Pump:  fentanyl 2000mcg/mL  Bupivacaine 20mg/mL  Morphine 5.8mg/mL     Rate:   Fentanyl 900mcg/day  Bupivacaine 9mg/day  Morphine 2.6mg/day  Pump Last Fill Date:  4/16/2020         STOP taking these medications       blood glucose monitoring (ACCU-CHEK COMPACT CARE KIT) meter device kit Comments:   Reason for Stopping:             Allergies   Allergies   Allergen Reactions     Cephalexin Diarrhea     Liraglutide Other (See Comments)     Sulfa Drugs Swelling     Pt has taken  Taken sulfa drugs orally without trouble. He had problems with Sulfa eye drops. Eye swelled up     Victoza      Increased migraine frequency and severity     Data   Most Recent 3 CBC's:  Recent Labs   Lab Test 04/23/20  0604 04/22/20  0636 04/21/20  0545   WBC 2.3* 2.1* 2.5*   HGB 10.2* 10.0* 9.9*   MCV 98 99 98   PLT 98* 91* 97*      Most Recent 3 BMP's:  Recent Labs   Lab Test 04/22/20  0636 04/21/20  0545 04/20/20  0645    141 139   POTASSIUM 4.1 4.0 3.9   CHLORIDE 111* 110* 110*   CO2 28 26 22   BUN 9 9 14   CR 0.98 0.91 0.96   ANIONGAP 3 5 7   LUIS 8.6 8.3* 8.2*   * 119* 86     Most Recent 2 LFT's:  Recent Labs   Lab Test 04/22/20  0636 04/19/20  2231   AST 9 16   ALT 21 29   ALKPHOS 56 68   BILITOTAL 0.4 0.5     Most Recent INR's and Anticoagulation Dosing History:  Anticoagulation Dose History     Recent Dosing and Labs Latest Ref Rng & Units 6/18/2015 6/23/2016 9/17/2016 2/1/2017 2/19/2017 1/27/2018 4/17/2020    INR 0.86 - 1.14 1.1 1.1 1.13 1.15(H) 1.08 1.06 1.18(H)        Most Recent 3 Troponin's:  Recent Labs   Lab Test 04/20/20  1024 04/20/20  0645 04/20/20  0244  01/26/18  1912   TROPI 0.065* 0.075* 0.077*   < >  --    TROPONIN  --   --   --   --  0.00    < > = values in this interval not displayed.     Most Recent Cholesterol Panel:  Recent Labs   Lab Test 02/24/20  1219   CHOL 163   *   HDL 32*   TRIG 121     Most Recent 6 Bacteria Isolates From Any Culture (See EPIC  Reports for Culture Details):  Recent Labs   Lab Test 19  0827 17  1904 17  1848   CULT No beta hemolytic Streptococcus Group A isolated No growth No growth     Most Recent TSH, T4 and A1c Labs:  Recent Labs   Lab Test 20  0645 20  0730   TSH 1.74  --    A1C  --  5.5       Results for orders placed or performed during the hospital encounter of 20   Chest XR,  PA & LAT    Narrative    EXAM: XR CHEST 2 VW  LOCATION: [facility]  DATE/TIME: 2020 11:58 PM    INDICATION: Right flank pain. Elevated troponin. Weakness.  COMPARISON: 2018.      Impression    IMPRESSION: Minimal linear atelectasis or scar left base. Lungs otherwise clear. Heart size within normal limits.   US Renal Complete    Narrative    US RENAL COMPLETE 2020 10:42 AM    CLINICAL HISTORY: right flank pain  TECHNIQUE: Routine Bilateral Renal and Bladder Ultrasound.    COMPARISON: CT 4/15/2020    FINDINGS:    RIGHT KIDNEY: 12.9 x 6.3 x 6 cm. Normal without hydronephrosis or  masses.     LEFT KIDNEY: 13.2 x 6 x 6.5 cm. Normal without hydronephrosis or  masses.     BLADDER: Decompressed.      Impression    IMPRESSION:  1.  Normal kidney ultrasound.    SACHIN MORTON MD   Echocardiogram Complete    Narrative    [id number]  [id number]  [id number]^SUPA^JOSE^YULIA           [facility]  Echocardiography Laboratory  [address] [place] [address]        Name: JULIO PRESCOTT  MRN: [id number]  : 1955  Study Date: 2020 11:09 AM  Age: 64 yrs  Gender: Male  Patient Location: [facility]  Reason For Study: Abn EKG  Ordering Physician: JOSE COWART  Referring Physician: LISSETH GARCIA  Performed By: Jose Bolaños RDCS     BSA: 2.2 m2  Height: 67 in  Weight: 240 lb  HR: 79  BP: 118/76 mmHg  _____________________________________________________________________________  __        Procedure  Complete Portable Echo Adult. Optison (NDC #6873-2602) given  intravenously.  _____________________________________________________________________________  __        Interpretation Summary     Technically challenging study due to patient habitus, even with the use of  contrast imaging.     Left ventricular size, global systolic function, and wall motion are normal,  estimated LVEF 60-65%.  Right ventricular global function is normal.  No significant valvular abnormalities.  The ascending aorta is Mildly dilated. Max diameter of the visualized portion  3.9 cm.     This study was compared to a TTE from 9/2017. There has been no significant  change in biventricular function. The ascending aorta was previously measured  at 3.7cm.  _____________________________________________________________________________  __        Left Ventricle  The left ventricle is normal in size. There is concentric remodeling present.  Left ventricular systolic function is normal. The visual ejection fraction is  estimated at 60-65%. Left ventricular diastolic function is normal. No  regional wall motion abnormalities noted.     Right Ventricle  The right ventricle is normal in structure, function and size.     Atria  The left atrium is mildly dilated. Right atrial size is normal.     Mitral Valve  The mitral valve is normal in structure and function.        Tricuspid Valve  The tricuspid valve is not well visualized, but is grossly normal. There is  trace tricuspid regurgitation. Right ventricular systolic pressure could not  be approximated due to inadequate tricuspid regurgitation.     Aortic Valve  The aortic valve is trileaflet. There is mild trileaflet aortic sclerosis.     Pulmonic Valve  The pulmonic valve is not well seen, but is grossly normal.     Vessels  The aortic root is normal size. The ascending aorta is Mildly dilated. Max  diameter of the visualized portion 3.9 cm. The inferior vena cava was normal  in size with preserved respiratory variability.     Pericardium  There is no  pericardial effusion.     _____________________________________________________________________________  __  MMode/2D Measurements & Calculations  IVSd: 1.3 cm  LVIDd: 4.5 cm  LVIDs: 1.8 cm  LVPWd: 1.1 cm  FS: 59.7 %  LV mass(C)d: 195.2 grams  LV mass(C)dI: 89.4 grams/m2     Ao root diam: 3.5 cm  LA dimension: 4.0 cm  asc Aorta Diam: 3.9 cm  LA/Ao: 1.1  LA Volume (BP): 80.6 ml  LA Volume Index (BP): 37.0 ml/m2  RWT: 0.50        Doppler Measurements & Calculations  MV E max yassine: 94.3 cm/sec  MV A max yassine: 66.8 cm/sec  MV E/A: 1.4  MV dec time: 0.21 sec     PA acc time: 0.18 sec  E/E' av.9  Lateral E/e': 7.5  Medial E/e': 12.4           _____________________________________________________________________________  __           Report approved by: Savannah Johansen 2020 12:33 PM

## 2020-04-24 ENCOUNTER — TELEPHONE (OUTPATIENT)
Dept: FAMILY MEDICINE | Facility: CLINIC | Age: 65
End: 2020-04-24

## 2020-04-24 LAB
GLUCOSE BLDC GLUCOMTR-MCNC: 120 MG/DL (ref 70–99)
GLUCOSE BLDC GLUCOMTR-MCNC: 131 MG/DL (ref 70–99)
GLUCOSE BLDC GLUCOMTR-MCNC: 133 MG/DL (ref 70–99)
GLUCOSE BLDC GLUCOMTR-MCNC: 135 MG/DL (ref 70–99)

## 2020-04-24 NOTE — TELEPHONE ENCOUNTER
Pt is scheduled with SN next week.  Norma Casillas RN      ED for acute condition Discharge Protocol      Follow-ups Needed After Discharge     Follow-up Appointments     Follow-up and recommended labs and tests       Follow up with primary care provider, Vince Henry, within 7 days for   hospital follow- up.  No follow up labs or test are needed.  Follow up with your pain clinic provider for chronic pain management.

## 2020-04-24 NOTE — TELEPHONE ENCOUNTER
please call patient for Hospital follow up- has a TV  appt scheduled on 4/30/20 with Dr Nazario.  [facility] Unit Coordinator

## 2020-04-27 ENCOUNTER — TRANSFERRED RECORDS (OUTPATIENT)
Dept: HEALTH INFORMATION MANAGEMENT | Facility: CLINIC | Age: 65
End: 2020-04-27

## 2020-04-30 ENCOUNTER — VIRTUAL VISIT (OUTPATIENT)
Dept: FAMILY MEDICINE | Facility: CLINIC | Age: 65
End: 2020-04-30
Payer: COMMERCIAL

## 2020-04-30 DIAGNOSIS — I10 HYPERTENSION GOAL BP (BLOOD PRESSURE) < 140/90: ICD-10-CM

## 2020-04-30 DIAGNOSIS — E85.9 AMYLOIDOSIS, UNSPECIFIED TYPE (H): ICD-10-CM

## 2020-04-30 DIAGNOSIS — E11.9 TYPE 2 DIABETES MELLITUS WITHOUT COMPLICATION, WITHOUT LONG-TERM CURRENT USE OF INSULIN (H): ICD-10-CM

## 2020-04-30 DIAGNOSIS — K74.60 HEPATIC CIRRHOSIS, UNSPECIFIED HEPATIC CIRRHOSIS TYPE, UNSPECIFIED WHETHER ASCITES PRESENT (H): Primary | ICD-10-CM

## 2020-04-30 PROCEDURE — 99214 OFFICE O/P EST MOD 30 MIN: CPT | Mod: 95 | Performed by: FAMILY MEDICINE

## 2020-04-30 NOTE — PROGRESS NOTES
"Parmjit Hernández is a 64 year old male who is being evaluated via a billable telephone visit.      The patient has been notified of following:     \"This telephone visit will be conducted via a call between you and your physician/provider. We have found that certain health care needs can be provided without the need for a physical exam.  This service lets us provide the care you need with a short phone conversation.  If a prescription is necessary we can send it directly to your pharmacy.  If lab work is needed we can place an order for that and you can then stop by our lab to have the test done at a later time.    Telephone visits are billed at different rates depending on your insurance coverage. During this emergency period, for some insurers they may be billed the same as an in-person visit.  Please reach out to your insurance provider with any questions.    If during the course of the call the physician/provider feels a telephone visit is not appropriate, you will not be charged for this service.\"    Patient has given verbal consent for Telephone visit?  Yes    How would you like to obtain your AVS? Emiliana Kate     Parmjit Hernández is a 64 year old male who presents to clinic today for the following health issues:    [facility]    Hospital Follow-up Visit:    He was in the ER 3 times and noted to have low Bp  Had kidney injury - high creat          Diagnoses:  1)KWABENA azotemia  2) Lactic acidosis hypovolemia  3)Hypotension overdose of lisinopril    These all seem to be related to hypovolemia and poor oral intake as result of abdominal pain.  He has been using chronic narcotics and it is possible that he has had some rebound pain as a result.  He continues to see the pain clinic his last labs were all improved and his most recent blood pressure did seem to be better today he checked it and it was 129/94 however his pulse remains a little bit elevated at 115.  Oral intake is still uncomfortable but he is able to keep " some food down and water and fluids.      4) elevated  Trop:  He did have evaluation by cardiology and he had an echo and EKG all of which did not seem to show any signs of heart disease.  It suspected that he had a rise of troponin because of being hypovolemic.  Serial troponins did flatten and return to normal      5)  Liver is cirrhotic  Fatty liver noted due to chronic obesity.  He was tested for hep C and hep B and these were negative.  He   has been advised to have a repeat ultrasound in 6 months and to follow-up with gastroenterology.  Has been advised to have a repeat ultrasound in 6 months and to see gastroenterology.    6) Amyloidosis:  This is a chronic condition and he is seeing hematology for this.  His last complete blood count was stable but lower than his baseline    7) hypoglycemia/DM T2  He did have his medications held both glipizide and metformin.  Upon discharge he had his metformin reintroduced at a lower dose of 500 mg twice daily.  He is still holding the glipizide and his blood sugars are hovering around 1 40-1 90 fasting.  As noted above he is still not eating much but has not noticed any low blood sugars    8) Chronic pain - sees pain clinic has pump  dirrhea thoughtto be due to narcotic withdrawal    Hospital/Nursing Home/IP Rehab Facility: [facility]  Date of Admission: 4/19/2020  Date of Discharge: 4/23/2020  Reason(s) for Admission: Pancytopenia, Persistent nausea, Generalized weakness and fatigue      Was your hospitalization related to COVID-19? No   Problems taking medications regularly:  None  Medication changes since discharge: None  Problems adhering to non-medication therapy:  None    Summary of hospitalization:  [facility] discharge summary reviewed  Diagnostic Tests/Treatments reviewed.  Follow up needed: labs and needs visit  Other Healthcare Providers Involved in Patient s Care:         Specialist appointment - heme gi and pain  Update since  discharge: improved. Post Discharge Medication Reconciliation: discharge medications reconciled, continue medications without change.  Plan of care communicated with patient                   Patient Active Problem List   Diagnosis     Obstructive sleep apnea     Hyperlipidemia LDL goal <100     Hypertension goal BP (blood pressure) < 140/90     Advanced directives, counseling/discussion     Migraine     History of diverticulitis     MENTAL HEALTH     Marianne (H)     Bipolar I disorder (H)     Chronic abdominal pain     Anxiety     Other amyloidosis (H)     Insomnia due to medical condition     Narcotic dependence (H)     Obstructive sleep apnea syndrome     Type 2 diabetes mellitus without complication, without long-term current use of insulin (H)     Restless legs syndrome (RLS)     Type 2 diabetes mellitus with other specified complication (H)     Peripheral edema     Morbid obesity (H)     Stasis dermatitis of both legs     Polyneuropathy associated with underlying disease (H)     Acquired lymphedema     Renal failure     Low blood pressure reading     History of peripheral stem cell transplant (H)     Anxiety and depression     Other cirrhosis of liver (H) possibly from vences      Diarrhea of presumed infectious origin     Diarrhea     KWABENA (acute kidney injury) (H)     Acute kidney failure, unspecified (H)     EKG abnormality     Past Surgical History:   Procedure Laterality Date     BMT PROTOCOL      for systemic amyloidosis     BONE MARROW BIOPSY, BONE SPECIMEN, NEEDLE/TROCAR N/A 6/8/2016    Procedure: BIOPSY BONE MARROW;  Surgeon: Nathan Agrawal MD;  Location:  GI     BONE MARROW BIOPSY, BONE SPECIMEN, NEEDLE/TROCAR N/A 2/20/2019    Procedure: BIOPSY BONE MARROW;  Surgeon: Michael Raygoza MD;  Location:  GI     C NONSPECIFIC PROCEDURE  94    Cholecystectomy     C NONSPECIFIC PROCEDURE  2000    repair deviated septum     CHOLECYSTECTOMY  1995    lap qian     COLONOSCOPY  2012    hx  polyps     ESOPHAGOSCOPY, GASTROSCOPY, DUODENOSCOPY (EGD), COMBINED N/A 5/29/2015    Procedure: COMBINED ESOPHAGOSCOPY, GASTROSCOPY, DUODENOSCOPY (EGD), BIOPSY SINGLE OR MULTIPLE;  Surgeon: John Jacob MD;  Location: [facility]     HERNIA REPAIR, UMBILICAL  2006       Social History     Tobacco Use     Smoking status: Never Smoker     Smokeless tobacco: Never Used   Substance Use Topics     Alcohol use: No     Alcohol/week: 0.0 standard drinks     Family History   Problem Relation Age of Onset     Cerebrovascular Disease Mother      C.A.D. Mother      Hypertension Mother      Alcohol/Drug Mother      Arthritis Mother      Cerebrovascular Disease Maternal Grandmother      C.A.D. Maternal Grandmother      Alcohol/Drug Maternal Grandmother      C.A.D. Father      Heart Disease Father      Hypertension Father      Alcohol/Drug Father      Allergies Father      Circulatory Father      Depression Father      Respiratory Father      Asthma Father      Alcohol/Drug Paternal Grandfather      Cerebrovascular Disease Paternal Grandfather      Depression Son      Psychotic Disorder Son         anxiety disorder     Depression Daughter      Cerebrovascular Disease Maternal Grandfather      Cerebrovascular Disease Paternal Grandmother      Diabetes Brother      Allergies Sister      Depression Sister      Gynecology Sister          Current Outpatient Medications   Medication Sig Dispense Refill     aspirin (ASPIRIN LOW DOSE) 81 MG tablet Take 1 tablet (81 mg) by mouth daily 30 tablet 3     atorvastatin (LIPITOR) 10 MG tablet TAKE 1 TABLET(10 MG) BY MOUTH DAILY 90 tablet 1     blood glucose (ACCU-CHEK COMPACT DRUM) test strip Use to test blood sugars 3 to 4 times daily. 400 strip 1     blood glucose (ACCU-CHEK MULTICLIX) lancing device Lancing device to be used with lancets. 1 each 0     blood glucose monitoring (SOFTCLIX) lancets USE TO TEST BLOOD SUGAR 3-4 TIMES DAILY OR AS DIRECTED.  1     divalproex (DEPAKOTE) 500 MG EC  tablet Take 2 tablets (1,000 mg) by mouth daily 10 tablet 0     glipiZIDE (GLUCOTROL XL) 2.5 MG 24 hr tablet Take 1 tablet (2.5 mg) by mouth daily 30 tablet 0     lurasidone (LATUDA) 40 MG TABS tablet Take 40 mg by mouth At Bedtime       metFORMIN (GLUCOPHAGE-XR) 500 MG 24 hr tablet Take 1 tablet (500 mg) by mouth 2 times daily (with meals) Until follow-up with primary doctor and lab recheck 360 tablet 0     metoclopramide (REGLAN) 10 MG tablet Take 1 tablet (10 mg) by mouth 3 times daily as needed 20 tablet 0     modafinil (PROVIGIL) 200 MG tablet Take 200 mg by mouth every morning        Multiple Vitamins-Minerals (SENIOR MULTIVITAMIN PLUS PO) Take 1 tablet by mouth daily       NONFORMULARY by Intrathecal route continuous - + up to 3 boluses daily    Managed by Dr Pawan Shaw, [facility]     Medications in Pump:  fentanyl 2000mcg/mL  Bupivacaine 20mg/mL  Morphine 5.8mg/mL     Rate:   Fentanyl 900mcg/day  Bupivacaine 9mg/day  Morphine 2.6mg/day  Pump Last Fill Date:  4/16/2020       pregabalin (LYRICA) 100 MG capsule Take 1 capsule (100 mg) by mouth 4 times daily 360 capsule 3     prochlorperazine (COMPAZINE) 10 MG tablet Take 1 tablet (10 mg) by mouth every 6 hours as needed for vomiting 20 tablet 0     sennosides (SENOKOT) 8.6 MG tablet Take one tablet in the morning and 2 tablets in the evening.       tamsulosin (FLOMAX) 0.4 MG capsule Take 0.8 mg by mouth daily   3     vitamin C (ASCORBIC ACID) 1000 MG TABS Take 1,000 mg by mouth daily       Recent Labs   Lab Test 04/22/20  0636 04/21/20  0545 04/20/20  0645 04/19/20  2231  04/17/20 2009 04/16/20  0730  02/24/20  1219 01/09/20  1058 07/31/19  1039  05/13/19  1153  08/28/18  1259  05/16/18  1239   A1C  --   --   --   --   --   --  5.5  --   --  7.4* 5.4  --   --    < >  --   --  7.2*   LDL  --   --   --   --   --   --   --   --  107*  --   --   --  39  --   --   --  27   HDL  --   --   --   --   --   --   --   --  32*  --   --   --  26*  --   --   --   25*   TRIG  --   --   --   --   --   --   --   --  121  --   --   --  269*  --   --   --  286*   ALT 21  --   --  29  --  43 55  --   --  36  --   --   --    < > 28  --   --    CR 0.98 0.91 0.96 0.96   < > 2.09* 1.20   < >  --  1.02  --    < > 1.26*   < > 0.90   < > 0.82   GFRESTIMATED 82 89 84 83   < > 33* 64   < >  --  78  --    < > 60*   < > 86   < > >90   GFRESTBLACK >90 >90 >90 >90   < > 38* 74   < >  --  90  --    < > 70   < > >90   < > >90   POTASSIUM 4.1 4.0 3.9 3.7   < > 4.1 3.8   < >  --  4.1  --    < > 4.8   < > 3.7   < > 4.7   TSH  --   --  1.74  --   --   --   --   --   --   --   --   --   --   --  1.75  --  2.81    < > = values in this interval not displayed.      BP Readings from Last 3 Encounters:   04/23/20 (!) 158/100   04/18/20 128/82   04/17/20 116/66    Wt Readings from Last 3 Encounters:   04/23/20 111.4 kg (245 lb 9.6 oz)   04/18/20 114.4 kg (252 lb 4.8 oz)   03/19/20 121.1 kg (267 lb)                    Reviewed and updated as needed this visit by Provider         Review of Systems   ROS COMP: Constitutional, HEENT, cardiovascular, pulmonary, gi and gu systems are negative, except as otherwise noted.       Objective   Reported vitals:  There were no vitals taken for this visit.   healthy, alert and no distress  PSYCH: Alert and oriented times 3; coherent speech, normal   rate and volume, able to articulate logical thoughts, able   to abstract reason, no tangential thoughts, no hallucinations   or delusions  His affect is normal  RESP: No cough, no audible wheezing, able to talk in full sentences  Remainder of exam unable to be completed due to telephone visits    Diagnostic Test Results:  Labs reviewed in Epic        Assessment/Plan:  1. Hepatic cirrhosis, unspecified hepatic cirrhosis type, unspecified whether ascites present (H)  Referred to GI.  As noted above his cirrhosis is likely due to fatty liver disease  - GASTROENTEROLOGY ADULT REF CONSULT ONLY; Future  - US Abdomen Complete;  Future  - CBC with platelets differential; Future  - **Comprehensive metabolic panel FUTURE anytime; Future  - AFP tumor marker; Future    2. Amyloidosis, unspecified type (H)  Continue to follow complete blood count has plans to see hematology in 6 months sooner if his CBC dips further  - CBC with platelets differential; Future    3. Type 2 diabetes mellitus without complication, without long-term current use of insulin (H)  Concern for hypoglycemia.  He is tolerating a lower dose of metformin 500 mg twice daily.  He is holding glipizide for now.  Encouraged him to check blood sugars twice a day and to be seen by a provider next week to evaluate if glipizide can be resumed even at a lower dose.    4. Hypertension goal BP (blood pressure) < 140/90  He did have hypotension as a result of nausea and hypovolemia.  He still does not have much of an appetite at home his blood pressures are improved at the 130/80-90 range.  We will recheck this with his follow-up visit.    All of his other conditions seem to have resolved and are noted above.      No follow-ups on file.      Phone call duration:  25 minutes    Bernie Nazario MD

## 2020-04-30 NOTE — PATIENT INSTRUCTIONS
1) WE WILL CALL YOU TO SET UP A FOLLOW UP VISIT :    --WILL RECEHCK cbc, BLOOD PRESSURES AND BLOOD SUGAR LOG    2)  SEE [facility] FOR THE CiRRHOSIS  3)  SEE hEMATOLOGY FOR THE AMYLOIDOSIS

## 2020-05-04 ENCOUNTER — PATIENT OUTREACH (OUTPATIENT)
Dept: CARE COORDINATION | Facility: CLINIC | Age: 65
End: 2020-05-04

## 2020-05-04 ENCOUNTER — OFFICE VISIT (OUTPATIENT)
Dept: FAMILY MEDICINE | Facility: CLINIC | Age: 65
End: 2020-05-04
Payer: COMMERCIAL

## 2020-05-04 VITALS
BODY MASS INDEX: 38.47 KG/M2 | DIASTOLIC BLOOD PRESSURE: 112 MMHG | HEART RATE: 98 BPM | HEIGHT: 67 IN | SYSTOLIC BLOOD PRESSURE: 182 MMHG

## 2020-05-04 DIAGNOSIS — K74.60 HEPATIC CIRRHOSIS, UNSPECIFIED HEPATIC CIRRHOSIS TYPE, UNSPECIFIED WHETHER ASCITES PRESENT (H): ICD-10-CM

## 2020-05-04 DIAGNOSIS — E85.9 AMYLOIDOSIS, UNSPECIFIED TYPE (H): ICD-10-CM

## 2020-05-04 DIAGNOSIS — Z76.89 HEALTH CARE HOME: Primary | ICD-10-CM

## 2020-05-04 DIAGNOSIS — K59.03 DRUG-INDUCED CONSTIPATION: ICD-10-CM

## 2020-05-04 DIAGNOSIS — K74.60 HEPATIC CIRRHOSIS, UNSPECIFIED HEPATIC CIRRHOSIS TYPE (H): ICD-10-CM

## 2020-05-04 DIAGNOSIS — G43.009 MIXED COMMON MIGRAINE AND MUSCLE CONTRACTION HEADACHE: ICD-10-CM

## 2020-05-04 DIAGNOSIS — R60.0 PERIPHERAL EDEMA: ICD-10-CM

## 2020-05-04 DIAGNOSIS — F39 MOOD DISORDER (H): ICD-10-CM

## 2020-05-04 DIAGNOSIS — I10 HYPERTENSION GOAL BP (BLOOD PRESSURE) < 140/90: Primary | ICD-10-CM

## 2020-05-04 DIAGNOSIS — G44.209 MIXED COMMON MIGRAINE AND MUSCLE CONTRACTION HEADACHE: ICD-10-CM

## 2020-05-04 LAB
AFP SERPL-MCNC: 1.7 UG/L (ref 0–8)
BASOPHILS # BLD AUTO: 0.1 10E9/L (ref 0–0.2)
BASOPHILS NFR BLD AUTO: 1 %
DIFFERENTIAL METHOD BLD: ABNORMAL
EOSINOPHIL # BLD AUTO: 0.2 10E9/L (ref 0–0.7)
EOSINOPHIL NFR BLD AUTO: 3.6 %
ERYTHROCYTE [DISTWIDTH] IN BLOOD BY AUTOMATED COUNT: 13.9 % (ref 10–15)
HCT VFR BLD AUTO: 32.4 % (ref 40–53)
HGB BLD-MCNC: 11.1 G/DL (ref 13.3–17.7)
LYMPHOCYTES # BLD AUTO: 0.5 10E9/L (ref 0.8–5.3)
LYMPHOCYTES NFR BLD AUTO: 9.6 %
MCH RBC QN AUTO: 33.6 PG (ref 26.5–33)
MCHC RBC AUTO-ENTMCNC: 34.3 G/DL (ref 31.5–36.5)
MCV RBC AUTO: 98 FL (ref 78–100)
MONOCYTES # BLD AUTO: 0.4 10E9/L (ref 0–1.3)
MONOCYTES NFR BLD AUTO: 7 %
NEUTROPHILS # BLD AUTO: 3.9 10E9/L (ref 1.6–8.3)
NEUTROPHILS NFR BLD AUTO: 78.8 %
PLATELET # BLD AUTO: 157 10E9/L (ref 150–450)
RBC # BLD AUTO: 3.3 10E12/L (ref 4.4–5.9)
WBC # BLD AUTO: 5 10E9/L (ref 4–11)

## 2020-05-04 PROCEDURE — 80053 COMPREHEN METABOLIC PANEL: CPT | Performed by: FAMILY MEDICINE

## 2020-05-04 PROCEDURE — 85025 COMPLETE CBC W/AUTO DIFF WBC: CPT | Performed by: FAMILY MEDICINE

## 2020-05-04 PROCEDURE — 36415 COLL VENOUS BLD VENIPUNCTURE: CPT | Performed by: FAMILY MEDICINE

## 2020-05-04 PROCEDURE — 82105 ALPHA-FETOPROTEIN SERUM: CPT | Performed by: FAMILY MEDICINE

## 2020-05-04 PROCEDURE — 99214 OFFICE O/P EST MOD 30 MIN: CPT | Performed by: FAMILY MEDICINE

## 2020-05-04 RX ORDER — METOPROLOL SUCCINATE 50 MG/1
50 TABLET, EXTENDED RELEASE ORAL DAILY
Qty: 30 TABLET | Refills: 1 | Status: SHIPPED | OUTPATIENT
Start: 2020-05-04 | End: 2020-06-29

## 2020-05-04 RX ORDER — DOCUSATE SODIUM 100 MG/1
100 CAPSULE, LIQUID FILLED ORAL 3 TIMES DAILY PRN
Qty: 90 CAPSULE | Refills: 1 | Status: SHIPPED | OUTPATIENT
Start: 2020-05-04 | End: 2020-06-15

## 2020-05-04 RX ORDER — SUMATRIPTAN 50 MG/1
50 TABLET, FILM COATED ORAL
Qty: 8 TABLET | Refills: 1 | Status: SHIPPED | OUTPATIENT
Start: 2020-05-04

## 2020-05-04 RX ORDER — DIVALPROEX SODIUM 500 MG/1
750 TABLET, DELAYED RELEASE ORAL AT BEDTIME
Qty: 10 TABLET | Refills: 0 | COMMUNITY
Start: 2020-05-04 | End: 2023-03-10

## 2020-05-04 RX ORDER — CHLORTHALIDONE 25 MG/1
25 TABLET ORAL DAILY
Qty: 30 TABLET | Refills: 1 | Status: SHIPPED | OUTPATIENT
Start: 2020-05-04 | End: 2020-05-18

## 2020-05-04 NOTE — PROGRESS NOTES
"Subjective     Parmjit Hernández is a 64 year old male who presents to clinic today for the following health issues:    HPI   Patient was seen today through a NURSE ONLY -  transfered into a OV. Patient was recently at a ER and was told to stop all blood pressure medication. This has resulted in patient getting high readings.  Patient would like to have a plan on if he should or should not take blood pressure medication      cirrhotic liver, most likely from history of hepatic steatosis   Has Gastroenterology apo tomorrow at [facility].  Chronic migraine - has taken more advil than tylenol  Wondering if cihrosis is from  Medications. No alcohol consumption.  PROBLEMS TO ADD ON...constipation. tried enema last night.    BPH- under care of urlogist     Chronic pain - sees pain clinic has pump  has been pain pump- fentnyl for stomach spsm and morphine for back pain.  increased fentanyl last week-Under care of Dr Dr Cartagena- at [facility] pain management.  Had really good results with them but in last 6 weeks- again in more pain  Had a dye study and confirmed that he has function pump  And now fentaly has been increased    Now constipated- not drinking much water.  Very frutrated with pain management.     BP Readings from Last 3 Encounters:   05/04/20 (!) 182/112   04/23/20 (!) 158/100   04/18/20 128/82    Wt Readings from Last 3 Encounters:   04/23/20 111.4 kg (245 lb 9.6 oz)   04/18/20 114.4 kg (252 lb 4.8 oz)   03/19/20 121.1 kg (267 lb)                    PROBLEMS TO ADD ON...  Reviewed and updated as needed this visit by Provider         Review of Systems   ROS COMP: Constitutional, HEENT, cardiovascular, pulmonary, GI, , musculoskeletal, neuro, skin, endocrine and psych systems are negative, except as otherwise noted.      Objective    BP (!) 182/112 (BP Location: Left arm, Patient Position: Sitting, Cuff Size: Adult Large)   Pulse 98   Ht 1.702 m (5' 7\")   BMI 38.47 kg/m    Body mass index is 38.47 " kg/m .  Physical Exam   GENERAL: healthy, alert and no distress  EYES: Eyes grossly normal to inspection, PERRL and conjunctivae and sclerae normal  NECK: no adenopathy, no asymmetry, masses, or scars and thyroid normal to palpation  RESP: lungs clear to auscultation - no rales, rhonchi or wheezes  CV: regular rate and rhythm, normal S1 S2, no S3 or S4, no murmur, click or rub, no peripheral edema and peripheral pulses strong  ABDOMEN: soft, nontender, no hepatosplenomegaly, no masses and bowel sounds normal  MS: no gross musculoskeletal defects noted, no edema    Diagnostic Test Results:  Labs reviewed in Epic        Assessment & Plan     1. Hepatic cirrhosis  Avoid acetaminophen.  Avoid alcohol- patient reports no concerns  Proceed with [facility]-refferal    2.Hypertension goal BP (blood pressure) < 140/90  Resume anti-hypertension  Recheck Blood pressure in 2 weeks  - metoprolol succinate ER (TOPROL-XL) 50 MG 24 hr tablet; Take 1 tablet (50 mg) by mouth daily  Dispense: 30 tablet; Refill: 1  - chlorthalidone (HYGROTON) 25 MG tablet; Take 1 tablet (25 mg) by mouth daily  Dispense: 30 tablet; Refill: 1    3.. Peripheral edema  - chlorthalidone (HYGROTON) 25 MG tablet; Take 1 tablet (25 mg) by mouth daily  Dispense: 30 tablet; Refill: 1    4. Mixed common migraine and muscle contraction headache  - SUMAtriptan (IMITREX) 50 MG tablet; Take 1 tablet (50 mg) by mouth at onset of headache for migraine May repeat in 2 hours. Max 4 tablets/24 hours.  Dispense: 8 tablet; Refill: 1    5. Drug-induced constipation  On chronic pain medications fentanyl and narcotic  Advised to increase fluid intake and also stool softner  - docusate sodium (COLACE) 100 MG capsule; Take 1 capsule (100 mg) by mouth 3 times daily as needed for constipation  Dispense: 90 capsule; Refill: 1    6. Mood disorder (H)  Does not need refill- is under care of psych  - divalproex sodium delayed-release (DEPAKOTE) 500 MG DR tablet; Take 2 tablets (1,000 mg) by  mouth daily  Dispense: 10 tablet; Refill: 0     Potential medication side effects were discussed with the patient; let me know if any occur.        Return in about 2 weeks (around 5/18/2020) for medications recheck/review/refill, BP Recheck/ HTN.    Maribeth Ardon MD  [facility]

## 2020-05-04 NOTE — PATIENT INSTRUCTIONS
1. Hypertension goal BP (blood pressure) < 140/90  - metoprolol succinate ER (TOPROL-XL) 50 MG 24 hr tablet; Take 1 tablet (50 mg) by mouth daily  Dispense: 30 tablet; Refill: 1  - chlorthalidone (HYGROTON) 25 MG tablet; Take 1 tablet (25 mg) by mouth daily  Dispense: 30 tablet; Refill: 1  - follow up with provider in 2 weeks with own Blood pressure apparatus result  Keep us informed with any side effects from  Medications or concerns with  Blood pressure   2. Peripheral edema  - chlorthalidone (HYGROTON) 25 MG tablet; Take 1 tablet (25 mg) by mouth daily  Dispense: 30 tablet; Refill: 1    3. Mixed common migraine and muscle contraction headache  - SUMAtriptan (IMITREX) 50 MG tablet; Take 1 tablet (50 mg) by mouth at onset of headache for migraine May repeat in 2 hours. Max 4 tablets/24 hours.  Dispense: 8 tablet; Refill: 1    4. Mood disorder (H)  - divalproex sodium delayed-release (DEPAKOTE) 500 MG DR tablet; Take 2 tablets (1,000 mg) by mouth daily  Dispense: 10 tablet; Refill: 0    5. Drug-induced constipation  Add fiber to diet  Add water to diet  - docusate sodium (COLACE) 100 MG capsule; Take 1 capsule (100 mg) by mouth 3 times daily as needed for constipation  Dispense: 90 capsule; Refill: 1

## 2020-05-04 NOTE — PROGRESS NOTES
Patient is currently at the [facility] for labs and requested the CHW to call back tomorrow to discuss about CCC.

## 2020-05-05 ENCOUNTER — TRANSFERRED RECORDS (OUTPATIENT)
Dept: HEALTH INFORMATION MANAGEMENT | Facility: CLINIC | Age: 65
End: 2020-05-05

## 2020-05-05 LAB
ALBUMIN SERPL-MCNC: 3.6 G/DL (ref 3.4–5)
ALP SERPL-CCNC: 84 U/L (ref 40–150)
ALT SERPL W P-5'-P-CCNC: 33 U/L (ref 0–70)
ANION GAP SERPL CALCULATED.3IONS-SCNC: 9 MMOL/L (ref 3–14)
AST SERPL W P-5'-P-CCNC: 20 U/L (ref 0–45)
BILIRUB SERPL-MCNC: 0.6 MG/DL (ref 0.2–1.3)
BUN SERPL-MCNC: 18 MG/DL (ref 7–30)
CALCIUM SERPL-MCNC: 8.8 MG/DL (ref 8.5–10.1)
CHLORIDE SERPL-SCNC: 101 MMOL/L (ref 94–109)
CO2 SERPL-SCNC: 25 MMOL/L (ref 20–32)
CREAT SERPL-MCNC: 1.08 MG/DL (ref 0.66–1.25)
GFR SERPL CREATININE-BSD FRML MDRD: 72 ML/MIN/{1.73_M2}
GLUCOSE SERPL-MCNC: 195 MG/DL (ref 70–99)
POTASSIUM SERPL-SCNC: 3.9 MMOL/L (ref 3.4–5.3)
PROT SERPL-MCNC: 6.9 G/DL (ref 6.8–8.8)
SODIUM SERPL-SCNC: 135 MMOL/L (ref 133–144)

## 2020-05-05 NOTE — PROGRESS NOTES
Clinic Care Coordination Contact  [facility]/Voicemail       Clinical Data: Care Coordinator Outreach  Outreach attempted x 2.  Left message on patient's voicemail with call back information and requested return call.  Plan:. Care Coordinator will try to reach patient again in 3-5 business days.

## 2020-05-05 NOTE — PROGRESS NOTES
Patient stated that he was not feeling well this morning and would like a call back this afternoon sometime.

## 2020-05-06 NOTE — RESULT ENCOUNTER NOTE
Hello,    Your labs are improved - this is very good news.  The AFP level is very low - this indicates that the live does not seem significantly diseased.  The hemoglobin is improved. The liver enzymes are normal.    Bernie Nazario MD

## 2020-05-08 ENCOUNTER — MYC MEDICAL ADVICE (OUTPATIENT)
Dept: FAMILY MEDICINE | Facility: CLINIC | Age: 65
End: 2020-05-08

## 2020-05-08 ENCOUNTER — VIRTUAL VISIT (OUTPATIENT)
Dept: FAMILY MEDICINE | Facility: CLINIC | Age: 65
End: 2020-05-08
Payer: COMMERCIAL

## 2020-05-08 VITALS — DIASTOLIC BLOOD PRESSURE: 113 MMHG | SYSTOLIC BLOOD PRESSURE: 191 MMHG

## 2020-05-08 DIAGNOSIS — I10 HYPERTENSION GOAL BP (BLOOD PRESSURE) < 140/90: Primary | ICD-10-CM

## 2020-05-08 DIAGNOSIS — E11.9 TYPE 2 DIABETES MELLITUS WITHOUT COMPLICATION, WITHOUT LONG-TERM CURRENT USE OF INSULIN (H): ICD-10-CM

## 2020-05-08 DIAGNOSIS — N17.9 AKI (ACUTE KIDNEY INJURY) (H): ICD-10-CM

## 2020-05-08 PROCEDURE — 99214 OFFICE O/P EST MOD 30 MIN: CPT | Mod: 95 | Performed by: PHYSICIAN ASSISTANT

## 2020-05-08 RX ORDER — GLIPIZIDE 2.5 MG/1
2.5 TABLET, EXTENDED RELEASE ORAL DAILY
Refills: 0 | COMMUNITY
Start: 2020-05-08 | End: 2020-06-10

## 2020-05-08 RX ORDER — LISINOPRIL 20 MG/1
20 TABLET ORAL DAILY
Qty: 30 TABLET | Refills: 5 | Status: SHIPPED | OUTPATIENT
Start: 2020-05-08 | End: 2020-06-29

## 2020-05-08 NOTE — PROGRESS NOTES
"Parmjit Hernández is a 64 year old male who is being evaluated via a billable telephone visit.      The patient has been notified of following:     \"This telephone visit will be conducted via a call between you and your physician/provider. We have found that certain health care needs can be provided without the need for a physical exam.  This service lets us provide the care you need with a short phone conversation.  If a prescription is necessary we can send it directly to your pharmacy.  If lab work is needed we can place an order for that and you can then stop by our lab to have the test done at a later time.    Telephone visits are billed at different rates depending on your insurance coverage. During this emergency period, for some insurers they may be billed the same as an in-person visit.  Please reach out to your insurance provider with any questions.    If during the course of the call the physician/provider feels a telephone visit is not appropriate, you will not be charged for this service.\"    Patient has given verbal consent for Telephone visit?  Yes    What phone number would you like to be contacted at? [phone]    How would you like to obtain your AVS? Emiliana Kate     Parmjit Hernández is a 64 year old male who presents to clinic today for the following health issues:    HPI  Hypertension Follow-up      Do you check your blood pressure regularly outside of the clinic? Yes     Are you following a low salt diet? No    Are your blood pressures ever more than 140 on the top number (systolic) OR more   than 90 on the bottom number (diastolic), for example 140/90? Yes      How many servings of fruits and vegetables do you eat daily?  2-3    On average, how many sweetened beverages do you drink each day (Examples: soda, juice, sweet tea, etc.  Do NOT count diet or artificially sweetened beverages)?   1    How many days per week do you exercise enough to make your heart beat faster? 3 or less    How " many minutes a day do you exercise enough to make your heart beat faster? 9 or less    How many days per week do you miss taking your medication? 0    Erickson had very good control of his BP until his recent hospitalizations.  He did get KWABENA and his lisinopril was stopped.  It was thought to be due to dehydration and hypotension. Since he has been out, toprol 50 mg was recently started and he had updated his COMP on 5/4 and kidney function has returned to normal.  His BP continue to run high on Toprol.  Feels essentially the same.    His glucophage and glipizide were adjusted due to KWABENA as well.  He takes blood sugars 2-3 times a day, usually 150s fasting and higher post prandial, wonders if he should add meds back.    He has chronic nausea, failed most drugs.  Wonders about marinol.  Marijuana has never helped nor has CBD.      He also wonders about something to help him sleep.  Has failed ambien.  Melatonin not too much help.  Naturally a night owl.             BP Readings from Last 3 Encounters:   05/08/20 (!) 191/113   05/04/20 (!) 182/112   04/23/20 (!) 158/100    Wt Readings from Last 3 Encounters:   04/23/20 111.4 kg (245 lb 9.6 oz)   04/18/20 114.4 kg (252 lb 4.8 oz)   03/19/20 121.1 kg (267 lb)                    Reviewed and updated as needed this visit by Provider         Review of Systems   Constitutional, HEENT, cardiovascular, pulmonary, gi and gu systems are negative, except as otherwise noted.       Objective   Reported vitals:  There were no vitals taken for this visit.   alert and no distress  PSYCH: Alert and oriented times 3; coherent speech, normal   rate and volume, able to articulate logical thoughts, able   to abstract reason, no tangential thoughts, no hallucinations   or delusions  His affect is normal  RESP: No cough, no audible wheezing, able to talk in full sentences  Remainder of exam unable to be completed due to telephone visits    Diagnostic Test Results:  Labs reviewed in Epic         Assessment/Plan:  1. Hypertension goal BP (blood pressure) < 140/90  Will start with lisinopril 10 mg, and may taper back up to 20 mg.  He will continue to monitor his BP.2  - lisinopril (ZESTRIL) 20 MG tablet; Take 1 tablet (20 mg) by mouth daily  Dispense: 30 tablet; Refill: 5    2. Type 2 diabetes mellitus without complication, without long-term current use of insulin (H)  Will add small amount of glipizide.  Would like to switch to DPP4 to avoid hypoglycemia, but cost an issue at this time.  - glipiZIDE (GLUCOTROL XL) 2.5 MG 24 hr tablet; Take 1 tablet (2.5 mg) by mouth daily  Dispense:  ; Refill: 0    3. KWABENA (acute kidney injury) (H)  Resolved per recent labs.    Do not recommend anything for sleep at this time due to her current meds, past medical history and age.  I also do not feel marinol will be helpful since marijuana and CBD have not been    Will have him follow up in 1-2 weeks to see how BP and blood sugars have been running.      No follow-ups on file.      Phone call duration:  28 minutes    Rashad Rice PA-C

## 2020-05-08 NOTE — TELEPHONE ENCOUNTER
As,  Please multiple messages below.    In addition pr is requesting Marinol from another [employer] message.    Did you just want a visit next week to discuss all the requests below?    Please advise.  Thanks,  Norma Casillas RN        Previous [facility] [employer]:  Dr. Bhandari,     I forgot to ask you about a nausea medication the [employer] pharmacist suggested might work for me, Marinol.     Can you prescribe that please?  Thanks

## 2020-05-08 NOTE — TELEPHONE ENCOUNTER
As,  Pt is scheduled at 3:40 today.  Please see other messages below and advise.  Thanks,  Norma Casillas RN

## 2020-05-08 NOTE — PROGRESS NOTES
Clinic Care Coordination Contact  Patient did not have time to talk with CHW today he is waiting for the Clinic to call him but patient did want to enroll in CCC   The Clinic Community Health Worker spoke with  the patient today to discuss possible Clinic Care Coordination enrollment.  The service was described to the patient and immediate needs were discussed.  The patient agreed to enrollment and an assessment was scheduled.  The patient was provided with contact information for the clinic CHW.             Assessment date: 5/13/2020

## 2020-05-08 NOTE — TELEPHONE ENCOUNTER
Reason for Call:  Request for results:    Name of test or procedure: creatinine     Date of test of procedure: 5/4    Location of the test or procedure: [facility]    OK to leave the result message on voice mail or with a family member? YES    Phone number Patient can be reached at:  Home number on file [phone] (home)    Additional comments: patient did not understand all lab results and wanted to know if kidney function was normal.   In addition to questions below. He has also sent another blogTV message  Dr. Bhandari,     I forgot to ask you about a nausea medication the [facility] pharmacist suggested might work for me, Marinol.     Can you prescribe that please?  Thanks      Call taken on 5/8/2020 at 12:50 PM by Ely Azul

## 2020-05-09 ENCOUNTER — MYC MEDICAL ADVICE (OUTPATIENT)
Dept: FAMILY MEDICINE | Facility: CLINIC | Age: 65
End: 2020-05-09

## 2020-05-09 DIAGNOSIS — E66.01 MORBID OBESITY (H): Primary | ICD-10-CM

## 2020-05-09 DIAGNOSIS — K74.69 OTHER CIRRHOSIS OF LIVER (H): ICD-10-CM

## 2020-05-09 DIAGNOSIS — E11.69 TYPE 2 DIABETES MELLITUS WITH OTHER SPECIFIED COMPLICATION, WITHOUT LONG-TERM CURRENT USE OF INSULIN (H): ICD-10-CM

## 2020-05-11 DIAGNOSIS — E11.69 TYPE 2 DIABETES MELLITUS WITH OTHER SPECIFIED COMPLICATION, WITHOUT LONG-TERM CURRENT USE OF INSULIN (H): ICD-10-CM

## 2020-05-12 NOTE — TELEPHONE ENCOUNTER
SN,     Routing refill request to provider for review/approval because:  Rx listed as No print out 4/18/19 - Oncology (Raphael Silvestre, ) entered.    Sent patient [employer] message asking if he is taking. Patient response below:    During my recent hospital stay the Dr cut the dose in half to 500 mg 2x daily.  That is the dose I intend to continue.  Do you agree?  Thanks    Patient has telephone visit with you 5/18  Please authorize if appropriate.    Thanks,  Wandy Pena RN

## 2020-05-13 ENCOUNTER — PATIENT OUTREACH (OUTPATIENT)
Dept: CARE COORDINATION | Facility: CLINIC | Age: 65
End: 2020-05-13
Payer: COMMERCIAL

## 2020-05-13 SDOH — ECONOMIC STABILITY: FOOD INSECURITY: WITHIN THE PAST 12 MONTHS, YOU WORRIED THAT YOUR FOOD WOULD RUN OUT BEFORE YOU GOT MONEY TO BUY MORE.: NEVER TRUE

## 2020-05-13 SDOH — ECONOMIC STABILITY: TRANSPORTATION INSECURITY
IN THE PAST 12 MONTHS, HAS LACK OF TRANSPORTATION KEPT YOU FROM MEETINGS, WORK, OR FROM GETTING THINGS NEEDED FOR DAILY LIVING?: NO

## 2020-05-13 SDOH — ECONOMIC STABILITY: FOOD INSECURITY: WITHIN THE PAST 12 MONTHS, THE FOOD YOU BOUGHT JUST DIDN'T LAST AND YOU DIDN'T HAVE MONEY TO GET MORE.: NEVER TRUE

## 2020-05-13 SDOH — ECONOMIC STABILITY: INCOME INSECURITY: HOW HARD IS IT FOR YOU TO PAY FOR THE VERY BASICS LIKE FOOD, HOUSING, MEDICAL CARE, AND HEATING?: NOT VERY HARD

## 2020-05-13 SDOH — ECONOMIC STABILITY: TRANSPORTATION INSECURITY
IN THE PAST 12 MONTHS, HAS THE LACK OF TRANSPORTATION KEPT YOU FROM MEDICAL APPOINTMENTS OR FROM GETTING MEDICATIONS?: NO

## 2020-05-13 SDOH — HEALTH STABILITY: PHYSICAL HEALTH: ON AVERAGE, HOW MANY DAYS PER WEEK DO YOU ENGAGE IN MODERATE TO STRENUOUS EXERCISE (LIKE A BRISK WALK)?: 1 DAY

## 2020-05-13 ASSESSMENT — ACTIVITIES OF DAILY LIVING (ADL): DEPENDENT_IADLS:: INDEPENDENT

## 2020-05-13 NOTE — LETTER
[facility] CARE COORDINATION    May 20, 2020    Parmjit Hernández  [address]   [place] [address]      Dear Parmjit,    I am a clinic care coordinator who works with Malvin Henry MD at [facility]. I wanted to thank you for spending the time to talk with me.  Below is a description of clinic care coordination and how I can further assist you.      The clinic care coordination team is made up of a registered nurse,  and community health worker who understand the health care system. The goal of clinic care coordination is to help you manage your health and improve access to the health care system in the most efficient manner. The team can assist you in meeting your health care goals by providing education, coordinating services, strengthening the communication among your providers and supporting you with any resource needs.    Please feel free to contact the Community Health Worker at [phone] with any questions or concerns. We are focused on providing you with the highest-quality healthcare experience possible and that all starts with you.     Sincerely,     WILBER Evans    Enclosed: I have enclosed a copy of the Complex Care Plan. This has helpful information and goals that we have talked about. Please keep this in an easy to access place to use as needed.

## 2020-05-13 NOTE — LETTER
[facility]  Complex Care Plan  About Me:    Patient Name:  Parmjit Prescott    YOB: 1955  Age:         64 year old   Jack MRN:    [id number] Telephone Information:  Home Phone [phone]   Mobile [phone]       Address:  [address]  [place] [address] Email address:  [email or web address]      Emergency Contact(s)    Name Relationship Lgl Grd Work Phone Home Phone Mobile Phone   1. KENTRELL PRESCOTT Son  none [phone]   2. JC PRESCOTT Daughter   [phone]           Primary language:  English     needed? No   [facility] [employer]:  [phone] op. 1  Other communication barriers: Physical impairment  Preferred Method of Communication:  Emiliana  Current living arrangement: I live alone  Mobility Status/ Medical Equipment: Independent    Health Maintenance  Health Maintenance Reviewed: Up to date    My Access Plan  Medical Emergency 911   Primary Clinic Line [facility] [phone]   24 Hour Appointment Line [phone] or  [phone]) (toll-free)   24 Hour Nurse Line [phone] (toll-free)   Preferred Urgent Care     Preferred Hospital [facility]  [phone]   Preferred Pharmacy [id number] [place] [address] [facility]     Behavioral Health Crisis Line The National Suicide Prevention Lifeline at 1-948.652.6569 or 911             My Care Team Members  Patient Care Team       Relationship Specialty Notifications Start End    Vince Henry MD PCP - General Family Practice  12/31/18     Phone: [phone] Fax: [phone] [address] [facility] [address]    Kuldeep Borrero MD MD Gastroenterology  1/11/16     Phone: [phone] Fax: [phone]         [address] [facility] [address] [facility] [address]    Vince Henry MD Assigned PCP   2/3/19     Phone:  [phone] Fax: [phone]         [address] [facility] [address]    Kelly Shine LSW Lead Care Coordinator  Admissions 5/13/20     Phone: [phone]         Arlen Austin, RN Clinic Care Coordinator Primary Care - CC Admissions 5/13/20     Phone: [phone]                 My Care Plans  Self Management and Treatment Plan  Goals and (Comments)  Goals        General    Psychosocial (pt-stated)     Notes - Note edited  5/13/2020  1:43 PM by Kelly Shine LSW    Goal Statement: I would like to look into getting a PCA in the next 2 months  Date Goal set: 5/13/20  Barriers: Eligibility  Strengths: Motivated  Date to Achieve By: 2 months  Patient expressed understanding of goal: Yes  Action steps to achieve this goal:  1.  called [facility] Bee who confirms patient does not have this option for coverage.  2.  will send some low-cost home making options  3. I will update CHW during outreach calls and ask to speak with SW if needed              Action Plans on File:                       Advance Care Plans/Directives Type:        My Medical and Care Information  Problem List   Patient Active Problem List   Diagnosis     Obstructive sleep apnea     Hyperlipidemia LDL goal <100     Hypertension goal BP (blood pressure) < 140/90     Advanced directives, counseling/discussion     Migraine     History of diverticulitis     MENTAL HEALTH     Marianne (H)     Bipolar I disorder (H)     Chronic abdominal pain     Anxiety     Other amyloidosis (H)     Insomnia due to medical condition     Narcotic dependence (H)     Obstructive sleep apnea syndrome     Type 2 diabetes mellitus without complication, without long-term current use of insulin (H)     Restless legs syndrome (RLS)     Type 2 diabetes mellitus with other specified complication (H)     Peripheral edema     Morbid obesity (H)     Stasis dermatitis of both legs     Polyneuropathy associated with underlying  disease (H)     Acquired lymphedema     Renal failure     Low blood pressure reading     History of peripheral stem cell transplant (H)     Anxiety and depression     Other cirrhosis of liver (H) possibly from vences      Diarrhea of presumed infectious origin     Diarrhea     KWABENA (acute kidney injury) (H)     Acute kidney failure, unspecified (H)     EKG abnormality      Current Medications and Allergies:  See printed Medication Report.    Care Coordination Start Date: 5/13/2020   Frequency of Care Coordination:     Form Last Updated: 05/20/2020

## 2020-05-13 NOTE — PROGRESS NOTES
Clinic Care Coordination Contact    Clinic Care Coordination Contact  OUTREACH    Referral Information:  Referral Source: (PCP)     called and completed initial assessment.    Patient shares with SW that he was hospitalized on 4/19 at [facility] after having diarrhea for 10 days and that his blood pressure was extremely low. Patient is doing much better, and plans to have surgery soon at his pain clinic to have a pump put in his back as a trial to help with his back pain. Patient says if successful, it should take away 50% of his pain.    Patient shares that he is certified disabled and has chronic back and abdominal pain. Patient is able to do all ADL's independently, but would really like someone to cook for him and do light housekeeping.   Patient shares that his [facility] , Bee p:[phone] was going to see if his insurance would cover this. Patient asks about other alternatives for PCA. SW explains that in order to get PCA through the [facility] patient would most likely need to be on MA, but SW would check.    SW called [employer] [facility]  who states that he does not have coverage for PCA. Bee states that she did set up meal delivery for patient though.    SW called [facility] who shares that patient would most likely have a pretty big spend down to qualify for MA and get PCA. But, patient would most likely qualify for Alternative care waiver when over 65.     sent email to patient informing patient of information above and asked if he would like me to send him some resources for private home-making.           Chief Complaint   Patient presents with     Clinic Care Coordination - Initial        Universal Utilization:   Clinic Utilization  Difficulty keeping appointments:: No  Compliance Concerns: No  No-Show Concerns: No  No PCP office visit in Past Year: No  Utilization    Last refreshed: 5/13/2020 12:31 PM:  Hospital Admissions 3           Last refreshed:  5/13/2020 12:31 PM:  ED Visits 3           Last refreshed: 5/13/2020 12:31 PM:  No Show Count (past year) 2              Current as of: 5/13/2020 12:31 PM              Clinical Concerns:  Current Medical Concerns:  Chronic back and abdominal pain.    Current Behavioral Concerns: Bipolar, anxiety, depression    Education Provided to patient: [facility] program   Pain  Pain (GOAL):: Yes  Type: Chronic (>3mo)  Progression: Constant  Aggravating Factors: Activity  Health Maintenance Reviewed: Up to date  Clinical Pathway: None    Medication Management:  Independent     Functional Status:  Dependent ADLs:: Independent  Dependent IADLs:: Independent  Bed or wheelchair confined:: No  Mobility Status: Independent  Fallen 2 or more times in the past year?: No  Any fall with injury in the past year?: No    Living Situation:  Current living arrangement:: I live alone  Type of residence:: Apartment(Has a cat)    Lifestyle & Psychosocial Needs:  Lifestyle     Physical activity     Days per week: 1 day     Minutes per session: Not on file     Stress: Not on file     Social Needs     Financial resource strain: Not very hard     Food insecurity     Worry: Never true     Inability: Never true     Transportation needs     Medical: No     Non-medical: No     Diet:: Other  Inadequate nutrition (GOAL):: No  Tube Feeding: No  Inadequate activity/exercise (GOAL):: No  Significant changes in sleep pattern (GOAL): No  Transportation means:: Accessible car     Uatsdin or spiritual beliefs that impact treatment:: No  Mental health DX:: No(Has roxy stress/anxiety/depression)  Mental health management concern (GOAL):: No  Informal Support system:: Friends(Best friend Pavel/Talks to him everyday)   Socioeconomic History     Marital status:      Spouse name: Not on file     Number of children: 3     Years of education: Not on file     Highest education level: Not on file   Occupational History     Employer: [email or web address]      Tobacco Use     Smoking status: Never Smoker     Smokeless tobacco: Never Used   Substance and Sexual Activity     Alcohol use: No     Alcohol/week: 0.0 standard drinks     Drug use: No     Sexual activity: Never               Resources and Interventions:  Current Resources:      Community Resources: None  Supplies used at home:: None(CPAP)  Equipment Currently Used at Home: none(Can't walk very far without having back pain)    Advance Care Plan/Directive  Advanced Care Plans/Directives on file:: No    Referrals Placed: None     Goals:   Goals        General    Psychosocial (pt-stated)     Notes - Note edited  5/13/2020  1:32 PM by Kelly Shine LSW    Goal Statement: I would like to look into getting a PCA in the next 2 months  Date Goal set: 5/13/20  Barriers: Eligibility  Strengths: Motivated  Date to Achieve By: 2 months  Patient expressed understanding of goal: Yes  Action steps to achieve this goal:  1.  called [employer] who confirms patient does not have this option for coverage.  2.  will call Debbie saini and make [facility] referral  3. I will update CHW during outreach calls and ask to speak with SW if needed             Patient/Caregiver understanding: Yes       Future Appointments              In 5 days [facility] MD [facility]    In 2 weeks Tonya Dale RD, LD [facility], [place] MICHELLE    In 5 months SHUS3 [facility] [place]    In 5 months  LAB DRAW 1 [facility], [place]    In 5 months Grace Zepeda MD [facility], [facility]          Plan:   CHW: Please follow up with patient in two weeks.

## 2020-05-14 RX ORDER — METFORMIN HCL 500 MG
500 TABLET, EXTENDED RELEASE 24 HR ORAL 2 TIMES DAILY WITH MEALS
Qty: 60 TABLET | Refills: 0 | Status: SHIPPED | OUTPATIENT
Start: 2020-05-14 | End: 2020-06-03

## 2020-05-15 DIAGNOSIS — K74.69 OTHER CIRRHOSIS OF LIVER (H): Primary | ICD-10-CM

## 2020-05-15 DIAGNOSIS — R16.1 SPLEEN ENLARGED: ICD-10-CM

## 2020-05-18 ENCOUNTER — VIRTUAL VISIT (OUTPATIENT)
Dept: FAMILY MEDICINE | Facility: CLINIC | Age: 65
End: 2020-05-18
Payer: COMMERCIAL

## 2020-05-18 DIAGNOSIS — K74.69 OTHER CIRRHOSIS OF LIVER (H): ICD-10-CM

## 2020-05-18 DIAGNOSIS — E11.9 TYPE 2 DIABETES MELLITUS WITHOUT COMPLICATION, WITHOUT LONG-TERM CURRENT USE OF INSULIN (H): Primary | ICD-10-CM

## 2020-05-18 DIAGNOSIS — I10 HYPERTENSION GOAL BP (BLOOD PRESSURE) < 140/90: ICD-10-CM

## 2020-05-18 DIAGNOSIS — R60.0 PERIPHERAL EDEMA: ICD-10-CM

## 2020-05-18 PROCEDURE — 99214 OFFICE O/P EST MOD 30 MIN: CPT | Mod: 95 | Performed by: FAMILY MEDICINE

## 2020-05-18 RX ORDER — CHLORTHALIDONE 25 MG/1
TABLET ORAL
Qty: 30 TABLET | Refills: 1
Start: 2020-05-18 | End: 2020-06-10

## 2020-05-18 NOTE — PROGRESS NOTES
Please schedule patient for 2 weeks from today 05/18/ for a BP NURSE ONLY apppt per .  Petty Epps, CMA on 5/18/2020 at 4:39 PM

## 2020-05-18 NOTE — PROGRESS NOTES
"Parmjit Hernández is a 64 year old male who is being evaluated via a billable telephone visit.      The patient has been notified of following:     \"This telephone visit will be conducted via a call between you and your physician/provider. We have found that certain health care needs can be provided without the need for a physical exam.  This service lets us provide the care you need with a short phone conversation.  If a prescription is necessary we can send it directly to your pharmacy.  If lab work is needed we can place an order for that and you can then stop by our lab to have the test done at a later time.    Telephone visits are billed at different rates depending on your insurance coverage. During this emergency period, for some insurers they may be billed the same as an in-person visit.  Please reach out to your insurance provider with any questions.    If during the course of the call the physician/provider feels a telephone visit is not appropriate, you will not be charged for this service.\"    Patient has given verbal consent for Telephone visit?  Yes    What phone number would you like to be contacted at? [phone]    How would you like to obtain your AVS? Bobt    Subjective     Parmjit Hernández is a 64 year old male who presents to clinic today for the following health issues:    HPI  Diabetes Follow-up  Checks fasting blood sugar  [phone] 148 141  Taking metformin 500 mg twice daily recently had this reduced due to KWABENA which was also related to reduced pressures.  Was taking 1000mg bid  Taking low dose glipizide - tolerating this well    How often are you checking your blood sugar? One time daily  What time of day are you checking your blood sugars (select all that apply)?  Before meals  Have you had any blood sugars above 200?  No  Have you had any blood sugars below 70?  No    What symptoms do you notice when your blood sugar is low?  None    What concerns do you have today about your diabetes? " None     Do you have any of these symptoms? (Select all that apply)  No numbness or tingling in feet.  No redness, sores or blisters on feet.  No complaints of excessive thirst.  No reports of blurry vision.  No significant changes to weight.        Hyperlipidemia Follow-Up  Stable no changes    Are you regularly taking any medication or supplement to lower your cholesterol?   Yes- Atorvastatin    Are you having muscle aches or other side effects that you think could be caused by your cholesterol lowering medication?  No    Hypertension Follow-up  Checks pressure every morning  Last week his BP was 128/86, 155/94, 139/87  NOT taking chlorthalidone  Taking Toprol 50, Lisinopril 20   Our pressures still high but he has some white coat HTN.however he did have excellent BP control earlier this year  Discussed having him come into clinic with his cuff to compare    Do you check your blood pressure regularly outside of the clinic? No     Are you following a low salt diet? Yes    Are your blood pressures ever more than 140 on the top number (systolic) OR more   than 90 on the bottom number (diastolic), for example 140/90? Yes    BP Readings from Last 2 Encounters:   05/08/20 (!) 191/113   05/04/20 (!) 182/112     BP Readings from Last 6 Encounters:   05/08/20 (!) 191/113   05/04/20 (!) 182/112   04/23/20 (!) 158/100   04/18/20 128/82   04/17/20 116/66   03/19/20 116/77     Hemoglobin A1C (%)   Date Value   04/16/2020 5.5   01/09/2020 7.4 (H)     LDL Cholesterol Calculated (mg/dL)   Date Value   02/24/2020 107 (H)   05/13/2019 39         How many servings of fruits and vegetables do you eat daily?  2-3    On average, how many sweetened beverages do you drink each day (Examples: soda, juice, sweet tea, etc.  Do NOT count diet or artificially sweetened beverages)?   0    How many days per week do you exercise enough to make your heart beat faster? 3 or less    How many minutes a day do you exercise enough to make your heart  beat faster? 20 - 29    How many days per week do you miss taking your medication? 0         Chronic liver disease:  Working on weight loss  Seeing hepatology  Thus far stable    Patient Active Problem List   Diagnosis     Obstructive sleep apnea     Hyperlipidemia LDL goal <100     Hypertension goal BP (blood pressure) < 140/90     Advanced directives, counseling/discussion     Migraine     History of diverticulitis     MENTAL HEALTH     Marianne (H)     Bipolar I disorder (H)     Chronic abdominal pain     Anxiety     Other amyloidosis (H)     Insomnia due to medical condition     Narcotic dependence (H)     Obstructive sleep apnea syndrome     Type 2 diabetes mellitus without complication, without long-term current use of insulin (H)     Restless legs syndrome (RLS)     Type 2 diabetes mellitus with other specified complication (H)     Peripheral edema     Morbid obesity (H)     Stasis dermatitis of both legs     Polyneuropathy associated with underlying disease (H)     Acquired lymphedema     Renal failure     Low blood pressure reading     History of peripheral stem cell transplant (H)     Anxiety and depression     Other cirrhosis of liver (H) possibly from vences      Diarrhea of presumed infectious origin     Diarrhea     KWABNEA (acute kidney injury) (H)     Acute kidney failure, unspecified (H)     EKG abnormality     Past Surgical History:   Procedure Laterality Date     BMT PROTOCOL      for systemic amyloidosis     BONE MARROW BIOPSY, BONE SPECIMEN, NEEDLE/TROCAR N/A 6/8/2016    Procedure: BIOPSY BONE MARROW;  Surgeon: Nathan Agrawal MD;  Location:  GI     BONE MARROW BIOPSY, BONE SPECIMEN, NEEDLE/TROCAR N/A 2/20/2019    Procedure: BIOPSY BONE MARROW;  Surgeon: Michael Raygoza MD;  Location:  GI     C NONSPECIFIC PROCEDURE  94    Cholecystectomy     C NONSPECIFIC PROCEDURE  2000    repair deviated septum     CHOLECYSTECTOMY  1995    lap iqan     COLONOSCOPY  2012    hx polyps      ESOPHAGOSCOPY, GASTROSCOPY, DUODENOSCOPY (EGD), COMBINED N/A 5/29/2015    Procedure: COMBINED ESOPHAGOSCOPY, GASTROSCOPY, DUODENOSCOPY (EGD), BIOPSY SINGLE OR MULTIPLE;  Surgeon: John Jacob MD;  Location: [facility]     HERNIA REPAIR, UMBILICAL  2006       Social History     Tobacco Use     Smoking status: Never Smoker     Smokeless tobacco: Never Used   Substance Use Topics     Alcohol use: No     Alcohol/week: 0.0 standard drinks     Family History   Problem Relation Age of Onset     Cerebrovascular Disease Mother      C.A.D. Mother      Hypertension Mother      Alcohol/Drug Mother      Arthritis Mother      Cerebrovascular Disease Maternal Grandmother      C.A.D. Maternal Grandmother      Alcohol/Drug Maternal Grandmother      C.A.D. Father      Heart Disease Father      Hypertension Father      Alcohol/Drug Father      Allergies Father      Circulatory Father      Depression Father      Respiratory Father      Asthma Father      Alcohol/Drug Paternal Grandfather      Cerebrovascular Disease Paternal Grandfather      Depression Son      Psychotic Disorder Son         anxiety disorder     Depression Daughter      Cerebrovascular Disease Maternal Grandfather      Cerebrovascular Disease Paternal Grandmother      Diabetes Brother      Allergies Sister      Depression Sister      Gynecology Sister          Current Outpatient Medications   Medication Sig Dispense Refill     aspirin (ASPIRIN LOW DOSE) 81 MG tablet Take 1 tablet (81 mg) by mouth daily 30 tablet 3     atorvastatin (LIPITOR) 10 MG tablet TAKE 1 TABLET(10 MG) BY MOUTH DAILY 90 tablet 1     blood glucose (ACCU-CHEK COMPACT DRUM) test strip Use to test blood sugars 3 to 4 times daily. 400 strip 1     blood glucose (ACCU-CHEK MULTICLIX) lancing device Lancing device to be used with lancets. 1 each 0     blood glucose monitoring (SOFTCLIX) lancets USE TO TEST BLOOD SUGAR 3-4 TIMES DAILY OR AS DIRECTED.  1     chlorthalidone (HYGROTON) 25 MG tablet Take 1  tablet (25 mg) by mouth daily 30 tablet 1     divalproex sodium delayed-release (DEPAKOTE) 500 MG DR tablet Take 2 tablets (1,000 mg) by mouth daily 10 tablet 0     docusate sodium (COLACE) 100 MG capsule Take 1 capsule (100 mg) by mouth 3 times daily as needed for constipation 90 capsule 1     glipiZIDE (GLUCOTROL XL) 2.5 MG 24 hr tablet Take 1 tablet (2.5 mg) by mouth daily  0     lisinopril (ZESTRIL) 20 MG tablet Take 1 tablet (20 mg) by mouth daily 30 tablet 5     lurasidone (LATUDA) 40 MG TABS tablet Take 40 mg by mouth At Bedtime       metFORMIN (GLUCOPHAGE-XR) 500 MG 24 hr tablet Take 1 tablet (500 mg) by mouth 2 times daily (with meals) Until follow-up with primary doctor and lab recheck 60 tablet 0     metoclopramide (REGLAN) 10 MG tablet Take 1 tablet (10 mg) by mouth 3 times daily as needed 20 tablet 0     metoprolol succinate ER (TOPROL-XL) 50 MG 24 hr tablet Take 1 tablet (50 mg) by mouth daily 30 tablet 1     Multiple Vitamins-Minerals (SENIOR MULTIVITAMIN PLUS PO) Take 1 tablet by mouth daily       NONFORMULARY by Intrathecal route continuous - + up to 3 boluses daily    Managed by Dr Pawan Shaw [facility]     Medications in Pump:  fentanyl 2000mcg/mL  Bupivacaine 20mg/mL  Morphine 5.8mg/mL     Rate:   Fentanyl 900mcg/day  Bupivacaine 9mg/day  Morphine 2.6mg/day  Pump Last Fill Date:  4/16/2020       pregabalin (LYRICA) 100 MG capsule Take 1 capsule (100 mg) by mouth 4 times daily 360 capsule 3     prochlorperazine (COMPAZINE) 10 MG tablet Take 1 tablet (10 mg) by mouth every 6 hours as needed for vomiting 20 tablet 0     SUMAtriptan (IMITREX) 50 MG tablet Take 1 tablet (50 mg) by mouth at onset of headache for migraine May repeat in 2 hours. Max 4 tablets/24 hours. 8 tablet 1     tamsulosin (FLOMAX) 0.4 MG capsule Take 0.8 mg by mouth daily   3     vitamin C (ASCORBIC ACID) 1000 MG TABS Take 1,000 mg by mouth daily         Reviewed and updated as needed this visit by Provider         Review  of Systems   Constitutional, HEENT, cardiovascular, pulmonary, gi and gu systems are negative, except as otherwise noted.       Objective   Reported vitals:  There were no vitals taken for this visit.   healthy, alert and no distress  PSYCH: Alert and oriented times 3; coherent speech, normal   rate and volume, able to articulate logical thoughts, able   to abstract reason, no tangential thoughts, no hallucinations   or delusions  His affect is normal  RESP: No cough, no audible wheezing, able to talk in full sentences  Remainder of exam unable to be completed due to telephone visits    Diagnostic Test Results:  Labs reviewed in Epic        Assessment/Plan:  1. Type 2 diabetes mellitus without complication, without long-term current use of insulin (H)  Will try increasing his metformin to 1000 mg morning and 500 at night  Discussed this might help with weight loss (glipizide will raise weight)  reviewed seeing if this is tolerated and can increase further if needed  Last renal function was good      2. Other cirrhosis of liver (H) possibly from vences   plans for weight loss  Ongoing surveillance  Sees hepatology    3. Hypertension goal BP (blood pressure) < 140/90  Not at goal  Will have him come in with BP cuff and compare  Can adjust meds if pressures are not at goal  Of note he has not been taking Chlorthalidone      No follow-ups on file.      Phone call duration:  17 minutes    Bernie Nazario MD

## 2020-05-19 ENCOUNTER — ALLIED HEALTH/NURSE VISIT (OUTPATIENT)
Dept: NURSING | Facility: CLINIC | Age: 65
End: 2020-05-19
Payer: COMMERCIAL

## 2020-05-19 ENCOUNTER — TELEPHONE (OUTPATIENT)
Dept: FAMILY MEDICINE | Facility: CLINIC | Age: 65
End: 2020-05-19

## 2020-05-19 VITALS — BODY MASS INDEX: 39.94 KG/M2 | WEIGHT: 255 LBS | DIASTOLIC BLOOD PRESSURE: 99 MMHG | SYSTOLIC BLOOD PRESSURE: 166 MMHG

## 2020-05-19 DIAGNOSIS — I10 HYPERTENSION GOAL BP (BLOOD PRESSURE) < 140/90: Primary | ICD-10-CM

## 2020-05-19 PROCEDURE — 99207 ZZC NO CHARGE NURSE ONLY: CPT

## 2020-05-19 NOTE — TELEPHONE ENCOUNTER
Reason for Call:  Other appointment    Detailed comments:pt called to make nurse only martha for BP check and compare, Dr Alba asked him to come in. Will like to know if martha can be made now    Phone Number Patient can be reached at: Home number on file [phone] (home)    Best Time: any    Can we leave a detailed message on this number? YES    Call taken on 5/19/2020 at 10:48 AM by Carol Ponce

## 2020-05-20 ENCOUNTER — MYC MEDICAL ADVICE (OUTPATIENT)
Dept: FAMILY MEDICINE | Facility: CLINIC | Age: 65
End: 2020-05-20

## 2020-05-20 NOTE — TELEPHONE ENCOUNTER
Came in for BP check to compare his machine to ours. The reading here was 166/99  He thinks he needs his medication changed or adjusted. Sung Car ma    
Forwarded to triage  
Lets have him start with half tablet chlorthalidone and recheck BP here in 1 month    SN  
Sent andrez Dallas RN    
Sarcoidosis

## 2020-05-27 ENCOUNTER — HOSPITAL ENCOUNTER (OUTPATIENT)
Dept: NUTRITION | Facility: CLINIC | Age: 65
Discharge: HOME | End: 2020-05-27
Attending: FAMILY MEDICINE | Admitting: FAMILY MEDICINE
Payer: COMMERCIAL

## 2020-05-27 VITALS — WEIGHT: 244 LBS | BODY MASS INDEX: 38.22 KG/M2

## 2020-05-27 PROCEDURE — 97802 MEDICAL NUTRITION INDIV IN: CPT | Mod: 95 | Performed by: DIETITIAN, REGISTERED

## 2020-05-27 NOTE — PROGRESS NOTES
"Parmjit Hernández is a 64 year old male who is being evaluated via a billable telephone visit.      The patient has been notified of following:     \"This telephone visit will be conducted via a call between you and your physician/provider. We have found that certain health care needs can be provided without the need for a physical exam.  This service lets us provide the care you need with a short phone conversation.  If a prescription is necessary we can send it directly to your pharmacy.  If lab work is needed we can place an order for that and you can then stop by our lab to have the test done at a later time.    Telephone visits are billed at different rates depending on your insurance coverage. During this emergency period, for some insurers they may be billed the same as an in-person visit.  Please reach out to your insurance provider with any questions.    If during the course of the call the physician/provider feels a telephone visit is not appropriate, you will not be charged for this service.\"    Patient has given verbal consent for Telephone visit?  Yes    OUTPATIENT NUTRITION ASSESSMENT  REASON FOR ASSESSMENT  Parmjit Hernández referred by Dr. Booker for MNT related to   Morbid obesity (H) [E66.01]  - Primary        Other cirrhosis of liver (H) possibly from vences  [K74.69]        Type 2 diabetes mellitus with other specified complication, without long-term current use of insulin (H) [E11.69]           Patient accompanied by self    ASSESSMENT   Nutrition History:  - Information obtained from patient.  - Patient is on a regular diet at home. Patient states was diagnosed with diabetes 15 years ago but has not spoken with dietitian.  Patient states he has difficulty preparing meals due to the pain while standing.  Patient is hoping to hire PCA to prepare meals for him.  Patient shops multiple times per week and buys prepared items from deli or frozen foods.  Patient states does not have large palette when eating " "vegetables.  Patient likes carrots, corn, broccoli, peas, french fries.  Patient receiving COVID meals from his health insurance (3 meals per week). Patient was told he needs to lose 10% body weight in 1 year to help with cirrhosis.   .  Diet Recall:  Breakfast: eggs with shredded cheese + [employer] + Banana (9:00 AM)   Lunch: skips   Snack: [employer] foods quinoa salad, broccoli/raisin/ salad, craisins, cashews ([employer] del[employer] for salads) 2:00 PM   Dinner: jalapeno cheddar bits from [employer] + bagel salami sauce (7:00 PM) or parmesan frozen chicken breast   Snack: bagel sandwich (salami, provalone, banana peppers, cajun sunshine sauce) 12:00 AM  Beverages: diet soda (Diet Pepsi); water   Dining out: limited due to COVID-19     Exercise: Patient walking daily during past week.     NUTRITION FOCUSED PHYSICAL ASSESSMENT (NFPA) FOR DIAGNOSING MALNUTRITION  No:  Unable to assess due to telephone visit due to COVID-19 pandemic           Observed:   Unable to assess due to telephone visit due to COVID-19 pandemic     Obtained from Chart/Interdisciplinary Team:  None noted     LABS  Labs reviewed    MEDICATIONS  Medications reviewed    ANTHROPOMETRICS   Height: 5'7\"  Weight: 244 lbs   BMI (kg/m2): 38.2 kg/m2   Weight Status:  Obesity Grade II BMI 35-39.9  %IBW: 164%  Weight History: Patient with 10% weight loss in 2 months (fluid balance)   Wt Readings from Last 10 Encounters:   05/27/20 110.7 kg (244 lb)   05/19/20 115.7 kg (255 lb)   04/23/20 111.4 kg (245 lb 9.6 oz)   04/18/20 114.4 kg (252 lb 4.8 oz)   03/19/20 121.1 kg (267 lb)   03/05/20 122.5 kg (270 lb)   02/24/20 118.6 kg (261 lb 6.4 oz)   02/10/20 121.1 kg (267 lb)   01/22/20 119.9 kg (264 lb 4.8 oz)   01/09/20 123.8 kg (273 lb)     ASSESSED NUTRITION NEEDS  Estimated Energy Needs: 7468-9927 kcals/day (14-17 Kcal/Kg)  Justification:  (obese)  Estimated Protein Needs: 67-80 grams protein/day (1-1.2 g pro/Kg)  Justification:  (maintenance)  Estimated Fluid Needs: per " MD     ASSESSED MALNUTRITION STATUS  % Weight Loss:  >5% weight loss in 1 month (severe malnutrition) noted hospitalization and fluid balance   % Intake:  No decreased intake noted  Subcutaneous Fat Loss:  None observed due to telephone visit due to COVID-19 pandemic   Loss of Muscle Mass:  None observed due to telephone visit due to COVID-19 pandemic   Fluid Retention:  Mild per patient (describes swollen calves at the end of the day if does not wear compression socks)    Malnutrition Diagnosis:  Patient does not meet two of the above criteria necessary for diagnosing malnutrition    DIAGNOSIS   Nutrition Diagnosis:  Food and nutrition-related knowledge deficit related to lack of prior exposure as evidenced by no previous referral for nutrition education       INTERVENTIONS   Nutrition Prescription   Recommend modified carbohydrate diet 165-180 grams carbohydrate for weight loss and diabetes control; 2400 mg sodium due to hypertension     IMPLEMENTATION   Assessed learning needs and learning preference  Teaching Method(s) used: Explanation  Diet Education:  Provided education on weight loss, modified carbohydrate, low sodium diet   Nutrition Education (Content):   a)  Discussed portion sizes, label reading, carbohydrate counting and behavior modification.  Instructed patient on how to read label by using products found in patient's home.  Recommend < 36 grams added sugar and < 2400 mg sodium per day.  Encouraged patient to make gradual diet changes for long term weight loss success especially since patient has 80 lbs 2 times during lifetime with then weight regain.  Supported patient with his struggle with diet and weight loss.       b)  [employer] Count Your Carbs booklet and tips for low sodium diet and  low sodium foods and drinks handouts.  Nutrition Education (Application):   a)  Discussed current eating plans and recommended lower sodium food options for meals.   b)  Patient  verbalizes understanding of diet by stating will start reading labels.  Anticipate fair-good compliance     GOALS (Patient determined)  Monitor carbohydrates and sodium intake by reading labels     FOLLOW UP/MONITORING   Progress towards goals will be monitored and evaluated per protocol and Practice Guidelines  Patient to follow up in 4 weeks  RD name and number provided     Time Spent with Patient  60 minutes    Renan Arrington, RD, LD  [facility] Outpatient Dietitian  [phone] (office phone)

## 2020-05-29 ENCOUNTER — MYC MEDICAL ADVICE (OUTPATIENT)
Dept: FAMILY MEDICINE | Facility: CLINIC | Age: 65
End: 2020-05-29

## 2020-06-01 ENCOUNTER — ALLIED HEALTH/NURSE VISIT (OUTPATIENT)
Dept: NURSING | Facility: CLINIC | Age: 65
End: 2020-06-01
Payer: COMMERCIAL

## 2020-06-01 VITALS — SYSTOLIC BLOOD PRESSURE: 136 MMHG | DIASTOLIC BLOOD PRESSURE: 85 MMHG

## 2020-06-01 DIAGNOSIS — I10 HYPERTENSION GOAL BP (BLOOD PRESSURE) < 140/90: Primary | ICD-10-CM

## 2020-06-01 PROCEDURE — 99207 ZZC NO CHARGE NURSE ONLY: CPT

## 2020-06-03 ENCOUNTER — TRANSFERRED RECORDS (OUTPATIENT)
Dept: HEALTH INFORMATION MANAGEMENT | Facility: CLINIC | Age: 65
End: 2020-06-03

## 2020-06-03 DIAGNOSIS — E78.5 HYPERLIPIDEMIA LDL GOAL <100: Primary | ICD-10-CM

## 2020-06-03 DIAGNOSIS — I10 HYPERTENSION GOAL BP (BLOOD PRESSURE) < 140/90: ICD-10-CM

## 2020-06-03 RX ORDER — ASPIRIN 81 MG/1
TABLET, COATED ORAL
Qty: 30 TABLET | Refills: 3 | Status: SHIPPED | OUTPATIENT
Start: 2020-06-03 | End: 2020-06-15

## 2020-06-03 RX ORDER — ASPIRIN 81 MG/1
TABLET, COATED ORAL
COMMUNITY
Start: 2020-04-29 | End: 2020-06-03

## 2020-06-03 NOTE — TELEPHONE ENCOUNTER
"Prescription approved per [facility] Refill Protocol.  Wandy Pena RN    Last Written Prescription Date:  1/2/2020  Last Fill Quantity: 30,  # refills: 3   Last office visit: 5/18/2020 with prescribing provider:  Virtual visit with   Future Office Visit:   Next 5 appointments (look out 90 days)    Jun 17, 2020 10:30 AM CDT  Return Visit with Hyacinth Orta DPM, Podiatry/Foot and Ankle Surgery  [facility] ([facility]) [address]  [facility] [address]  [phone]         Requested Prescriptions   Pending Prescriptions Disp Refills     ASPIRIN LOW DOSE 81 MG EC tablet         Analgesics (Non-Narcotic Tylenol and ASA Only) Passed - 6/3/2020 10:20 AM        Passed - Recent (12 mo) or future (30 days) visit within the authorizing provider's specialty     Patient has had an office visit with the authorizing provider or a provider within the authorizing providers department within the previous 12 mos or has a future within next 30 days. See \"Patient Info\" tab in inbasket, or \"Choose Columns\" in Meds & Orders section of the refill encounter.              Passed - Patient is age 20 years or older     If ASA is flagged for ages under 20 years old. Forward to provider for confirmation Ryes Syndrome is not a concern.              Passed - Medication is active on med list         Signed Prescriptions Disp Refills    ASPIRIN LOW DOSE 81 MG EC tablet       Sig: TK 1 T PO D.       There is no refill protocol information for this order            "

## 2020-06-04 ENCOUNTER — HOSPITAL ENCOUNTER (INPATIENT)
Facility: CLINIC | Age: 65
Setting detail: SURGERY ADMIT
End: 2020-06-04
Attending: NEUROLOGICAL SURGERY | Admitting: NEUROLOGICAL SURGERY
Payer: COMMERCIAL

## 2020-06-04 DIAGNOSIS — Z11.59 ENCOUNTER FOR SCREENING FOR OTHER VIRAL DISEASES: Primary | ICD-10-CM

## 2020-06-08 ENCOUNTER — MYC MEDICAL ADVICE (OUTPATIENT)
Dept: FAMILY MEDICINE | Facility: CLINIC | Age: 65
End: 2020-06-08

## 2020-06-10 ENCOUNTER — OFFICE VISIT (OUTPATIENT)
Dept: FAMILY MEDICINE | Facility: CLINIC | Age: 65
End: 2020-06-10
Payer: COMMERCIAL

## 2020-06-10 ENCOUNTER — TELEPHONE (OUTPATIENT)
Dept: FAMILY MEDICINE | Facility: CLINIC | Age: 65
End: 2020-06-10

## 2020-06-10 VITALS
SYSTOLIC BLOOD PRESSURE: 121 MMHG | RESPIRATION RATE: 16 BRPM | OXYGEN SATURATION: 95 % | DIASTOLIC BLOOD PRESSURE: 75 MMHG | HEIGHT: 67 IN | BODY MASS INDEX: 41.23 KG/M2 | HEART RATE: 81 BPM | WEIGHT: 262.7 LBS

## 2020-06-10 DIAGNOSIS — R94.31 NONSPECIFIC ABNORMAL ELECTROCARDIOGRAM (ECG) (EKG): ICD-10-CM

## 2020-06-10 DIAGNOSIS — I10 BENIGN ESSENTIAL HYPERTENSION: ICD-10-CM

## 2020-06-10 DIAGNOSIS — E11.69 TYPE 2 DIABETES MELLITUS WITH OTHER SPECIFIED COMPLICATION, WITHOUT LONG-TERM CURRENT USE OF INSULIN (H): Primary | ICD-10-CM

## 2020-06-10 DIAGNOSIS — R60.0 BILATERAL LEG EDEMA: ICD-10-CM

## 2020-06-10 DIAGNOSIS — B35.1 ONYCHOMYCOSIS DUE TO DERMATOPHYTE: ICD-10-CM

## 2020-06-10 DIAGNOSIS — E11.9 TYPE 2 DIABETES MELLITUS WITHOUT COMPLICATION, WITHOUT LONG-TERM CURRENT USE OF INSULIN (H): ICD-10-CM

## 2020-06-10 DIAGNOSIS — R07.9 CHEST PAIN, UNSPECIFIED TYPE: ICD-10-CM

## 2020-06-10 LAB
ALBUMIN SERPL-MCNC: 3.6 G/DL (ref 3.4–5)
ALP SERPL-CCNC: 69 U/L (ref 40–150)
ALT SERPL W P-5'-P-CCNC: 27 U/L (ref 0–70)
ANION GAP SERPL CALCULATED.3IONS-SCNC: 6 MMOL/L (ref 3–14)
AST SERPL W P-5'-P-CCNC: 18 U/L (ref 0–45)
BILIRUB SERPL-MCNC: 0.4 MG/DL (ref 0.2–1.3)
BUN SERPL-MCNC: 24 MG/DL (ref 7–30)
CALCIUM SERPL-MCNC: 8.3 MG/DL (ref 8.5–10.1)
CHLORIDE SERPL-SCNC: 109 MMOL/L (ref 94–109)
CO2 SERPL-SCNC: 23 MMOL/L (ref 20–32)
CREAT SERPL-MCNC: 1.17 MG/DL (ref 0.66–1.25)
ERYTHROCYTE [DISTWIDTH] IN BLOOD BY AUTOMATED COUNT: 14.3 % (ref 10–15)
GFR SERPL CREATININE-BSD FRML MDRD: 66 ML/MIN/{1.73_M2}
GLUCOSE SERPL-MCNC: 162 MG/DL (ref 70–99)
HBA1C MFR BLD: 5.5 % (ref 0–5.6)
HCT VFR BLD AUTO: 35.6 % (ref 40–53)
HGB BLD-MCNC: 11.7 G/DL (ref 13.3–17.7)
MCH RBC QN AUTO: 33.4 PG (ref 26.5–33)
MCHC RBC AUTO-ENTMCNC: 32.9 G/DL (ref 31.5–36.5)
MCV RBC AUTO: 102 FL (ref 78–100)
PLATELET # BLD AUTO: 111 10E9/L (ref 150–450)
POTASSIUM SERPL-SCNC: 4.6 MMOL/L (ref 3.4–5.3)
PROT SERPL-MCNC: 6.8 G/DL (ref 6.8–8.8)
RBC # BLD AUTO: 3.5 10E12/L (ref 4.4–5.9)
SODIUM SERPL-SCNC: 138 MMOL/L (ref 133–144)
WBC # BLD AUTO: 6.2 10E9/L (ref 4–11)

## 2020-06-10 PROCEDURE — 87101 SKIN FUNGI CULTURE: CPT | Performed by: FAMILY MEDICINE

## 2020-06-10 PROCEDURE — 93000 ELECTROCARDIOGRAM COMPLETE: CPT | Performed by: FAMILY MEDICINE

## 2020-06-10 PROCEDURE — 85027 COMPLETE CBC AUTOMATED: CPT | Performed by: FAMILY MEDICINE

## 2020-06-10 PROCEDURE — 82043 UR ALBUMIN QUANTITATIVE: CPT | Performed by: FAMILY MEDICINE

## 2020-06-10 PROCEDURE — 80053 COMPREHEN METABOLIC PANEL: CPT | Performed by: FAMILY MEDICINE

## 2020-06-10 PROCEDURE — 83036 HEMOGLOBIN GLYCOSYLATED A1C: CPT | Performed by: FAMILY MEDICINE

## 2020-06-10 PROCEDURE — 36415 COLL VENOUS BLD VENIPUNCTURE: CPT | Performed by: FAMILY MEDICINE

## 2020-06-10 PROCEDURE — 99215 OFFICE O/P EST HI 40 MIN: CPT | Performed by: FAMILY MEDICINE

## 2020-06-10 RX ORDER — GLIPIZIDE 2.5 MG/1
2.5 TABLET, EXTENDED RELEASE ORAL DAILY
Refills: 0 | COMMUNITY
Start: 2020-06-10 | End: 2020-06-23

## 2020-06-10 RX ORDER — FUROSEMIDE 20 MG
20 TABLET ORAL DAILY
Qty: 90 TABLET | Refills: 3 | Status: ON HOLD | OUTPATIENT
Start: 2020-06-10 | End: 2020-08-18

## 2020-06-10 ASSESSMENT — MIFFLIN-ST. JEOR: SCORE: 1945.23

## 2020-06-10 NOTE — TELEPHONE ENCOUNTER
FYI~ A refill has been made to the  assistance program for Lyrica.     A 90 day supply of Lyrica will be delivered to Erickson's home on June 15th, 2020.    Thank you,    Merle Morales  Prescription Assistance

## 2020-06-10 NOTE — PATIENT INSTRUCTIONS
1)  Blood pressure:  Seems to be good.  I would stay at your current dose of medications for now.  Metoprolol 50 mg  And Lisinopril 20mg HOLD chlorthalidone    2)  Leg swelling:  Let's try a little dose of Furosemide/Lasix once daily - try just 10 mg or HALF tablet    3)  Diabetes:  Check A1C today.  HOLD glipizide.  Add some mid morning and afternoon snacks and KEEP metformin 1000mg and 500 mg     5)  See podiatry for the feet - ask about regular nail trimmings or consider Twinkle toes    6) see what your 2nd opinion advises for the back pain

## 2020-06-10 NOTE — PROGRESS NOTES
Subjective     Parmjit Hernández is a 64 year old male who presents to clinic today for the following health issues:    HPI   Musculoskeletal problem/pain      Duration: years back pain and burns with walking    Has multilevel disc disease of his back    Has seen back surgeon who has advised fusion    Has disc disease    Has recent xrays    Description  Location: Mostly the left hip but both    Intensity:  severe    Accompanying signs and symptoms: burning pain    History  Previous similar problem: YES  Previous evaluation:  none    Precipitating or alleviating factors:  Trauma or overuse: YES  Aggravating factors include: walking    Therapies tried and outcome: nothing and Ibuprofen    (E11.69) Type 2 diabetes mellitus with other specified complication, without long-term current use of insulin (H)  (primary encounter diagnosis)  Comment: recently was admitted to hospital for hypotension and hypoglycemic episodes. Had his meds adjusted.  Continues to take glipizide 2.5 mg daily and Metformin 1000 qAM 500 at bedtime. Last 2 nights has had dizziness similar to hypotensive episode he had in past  6:30 pm he tends to feel weak and sweaty and has to eat to raise BGM sugras ( 77 and the night before 98). Has shakes and lows around late afternoon.  NOT eating enough throughout day.  Taking breakfast, often skipping lunch then small dinner.    (I10) Benign essential hypertension  Comment: Pressures are better - usually around 120/70s at home.  Today has been noting increased pain in back.  Feels this is reason for higher pressures in clinic.  Taking toprol 50 mg daily and Lisinopril 20 mg.  Tolerating this well.  Also has been prescribed Chlorthalidone but stopped taking this a few days ago (concerned it was causing side effects.  Still notes MAXIMO which is incresed since stopping hydrochlorothiazide.  This was stopped due to acute KWABENA.    (B35.1) Onychomycosis due to dermatophyte  Comment: concern for toenails.  Hard t cut and  full of debris.  unfortunately has cirrhotic liver due to unclear cause  Plan: Orthopedic & Spine  Referral, Fungus         Culture,  skin, hair, or nail         o            (R07.9) Chest pain, unspecified type  Comment: has noted some substernal chest pain at rest intermittently.  Has NSTEmi noted recently with mild trop elevation. Is taking baby asa and toprol. Has been diaphoretic.  No dyspnea no nausea.  Is walking only not exercising much                      Patient Active Problem List   Diagnosis     Obstructive sleep apnea     Hyperlipidemia LDL goal <100     Hypertension goal BP (blood pressure) < 140/90     Advanced directives, counseling/discussion     Migraine     History of diverticulitis     MENTAL HEALTH     Marianne (H)     Bipolar I disorder (H)     Chronic abdominal pain     Anxiety     Other amyloidosis (H)     Insomnia due to medical condition     Narcotic dependence (H)     Obstructive sleep apnea syndrome     Type 2 diabetes mellitus without complication, without long-term current use of insulin (H)     Restless legs syndrome (RLS)     Type 2 diabetes mellitus with other specified complication (H)     Peripheral edema     Morbid obesity (H)     Stasis dermatitis of both legs     Polyneuropathy associated with underlying disease (H)     Acquired lymphedema     Renal failure     Low blood pressure reading     History of peripheral stem cell transplant (H)     Anxiety and depression     Other cirrhosis of liver (H) possibly from vences      Diarrhea of presumed infectious origin     Diarrhea     KWABENA (acute kidney injury) (H)     Acute kidney failure, unspecified (H)     EKG abnormality     Past Surgical History:   Procedure Laterality Date     BMT PROTOCOL      for systemic amyloidosis     BONE MARROW BIOPSY, BONE SPECIMEN, NEEDLE/TROCAR N/A 6/8/2016    Procedure: BIOPSY BONE MARROW;  Surgeon: Nathan Agrawal MD;  Location:  GI     BONE MARROW BIOPSY, BONE SPECIMEN, NEEDLE/TROCAR  N/A 2/20/2019    Procedure: BIOPSY BONE MARROW;  Surgeon: Michael Raygoza MD;  Location:  GI     C NONSPECIFIC PROCEDURE  94    Cholecystectomy     C NONSPECIFIC PROCEDURE  2000    repair deviated septum     CHOLECYSTECTOMY  1995    lap qian     COLONOSCOPY  2012    hx polyps     ESOPHAGOSCOPY, GASTROSCOPY, DUODENOSCOPY (EGD), COMBINED N/A 5/29/2015    Procedure: COMBINED ESOPHAGOSCOPY, GASTROSCOPY, DUODENOSCOPY (EGD), BIOPSY SINGLE OR MULTIPLE;  Surgeon: John Jacob MD;  Location:  GI     HERNIA REPAIR, UMBILICAL  2006       Social History     Tobacco Use     Smoking status: Never Smoker     Smokeless tobacco: Never Used   Substance Use Topics     Alcohol use: No     Alcohol/week: 0.0 standard drinks     Family History   Problem Relation Age of Onset     Cerebrovascular Disease Mother      C.A.D. Mother      Hypertension Mother      Alcohol/Drug Mother      Arthritis Mother      Cerebrovascular Disease Maternal Grandmother      C.A.D. Maternal Grandmother      Alcohol/Drug Maternal Grandmother      C.A.D. Father      Heart Disease Father      Hypertension Father      Alcohol/Drug Father      Allergies Father      Circulatory Father      Depression Father      Respiratory Father      Asthma Father      Alcohol/Drug Paternal Grandfather      Cerebrovascular Disease Paternal Grandfather      Depression Son      Psychotic Disorder Son         anxiety disorder     Depression Daughter      Cerebrovascular Disease Maternal Grandfather      Cerebrovascular Disease Paternal Grandmother      Diabetes Brother      Allergies Sister      Depression Sister      Gynecology Sister          Current Outpatient Medications   Medication Sig Dispense Refill     aspirin (ASPIRIN LOW DOSE) 81 MG tablet Take 1 tablet (81 mg) by mouth daily 30 tablet 3     ASPIRIN LOW DOSE 81 MG EC tablet TK 1 T PO D. 30 tablet 3     atorvastatin (LIPITOR) 10 MG tablet TAKE 1 TABLET(10 MG) BY MOUTH DAILY 90 tablet 1     blood  glucose (ACCU-CHEK COMPACT DRUM) test strip Use to test blood sugars 3 to 4 times daily. 400 strip 1     blood glucose (ACCU-CHEK MULTICLIX) lancing device Lancing device to be used with lancets. 1 each 0     blood glucose monitoring (SOFTCLIX) lancets USE TO TEST BLOOD SUGAR 3-4 TIMES DAILY OR AS DIRECTED.  1     divalproex sodium delayed-release (DEPAKOTE) 500 MG DR tablet Take 2 tablets (1,000 mg) by mouth daily 10 tablet 0     furosemide (LASIX) 20 MG tablet Take 1 tablet (20 mg) by mouth daily 90 tablet 3     glipiZIDE (GLUCOTROL XL) 2.5 MG 24 hr tablet Take 1 tablet (2.5 mg) by mouth daily HOLD FOR NOW  0     lisinopril (ZESTRIL) 20 MG tablet Take 1 tablet (20 mg) by mouth daily 30 tablet 5     lurasidone (LATUDA) 40 MG TABS tablet Take 40 mg by mouth At Bedtime       metFORMIN (GLUCOPHAGE-XR) 500 MG 24 hr tablet Take 1000mg in AM and 500mg in  tablet 1     metoprolol succinate ER (TOPROL-XL) 50 MG 24 hr tablet Take 1 tablet (50 mg) by mouth daily 30 tablet 1     Multiple Vitamins-Minerals (SENIOR MULTIVITAMIN PLUS PO) Take 1 tablet by mouth daily       NONFORMULARY by Intrathecal route continuous - + up to 3 boluses daily    Managed by Dr Pawan Shaw, [facility]     Medications in Pump:  fentanyl 2000mcg/mL  Bupivacaine 20mg/mL  Morphine 5.8mg/mL     Rate:   Fentanyl 900mcg/day  Bupivacaine 9mg/day  Morphine 2.6mg/day  Pump Last Fill Date:  4/16/2020       omega-3 acid ethyl esters (LOVAZA) 1 g capsule Take 1 capsule (1 g) by mouth daily 90 capsule 3     pregabalin (LYRICA) 100 MG capsule Take 1 capsule (100 mg) by mouth 4 times daily 360 capsule 3     SUMAtriptan (IMITREX) 50 MG tablet Take 1 tablet (50 mg) by mouth at onset of headache for migraine May repeat in 2 hours. Max 4 tablets/24 hours. 8 tablet 1     tamsulosin (FLOMAX) 0.4 MG capsule Take 0.8 mg by mouth daily   3     vitamin C (ASCORBIC ACID) 1000 MG TABS Take 1,000 mg by mouth daily       docusate sodium (COLACE) 100 MG capsule  "Take 1 capsule (100 mg) by mouth 3 times daily as needed for constipation 90 capsule 1     metoclopramide (REGLAN) 10 MG tablet Take 1 tablet (10 mg) by mouth 3 times daily as needed 20 tablet 0     prochlorperazine (COMPAZINE) 10 MG tablet Take 1 tablet (10 mg) by mouth every 6 hours as needed for vomiting 20 tablet 0     Recent Labs   Lab Test 06/10/20  1031 05/04/20  1135 04/22/20  0636  04/20/20  0645  04/16/20  0730  02/24/20  1219 01/09/20  1058  05/13/19  1153  08/28/18  1259  05/16/18  1239   A1C 5.5  --   --   --   --   --  5.5  --   --  7.4*   < >  --    < >  --   --  7.2*   LDL  --   --   --   --   --   --   --   --  107*  --   --  39  --   --   --  27   HDL  --   --   --   --   --   --   --   --  32*  --   --  26*  --   --   --  25*   TRIG  --   --   --   --   --   --   --   --  121  --   --  269*  --   --   --  286*   ALT 27 33 21  --   --    < > 55  --   --  36  --   --    < > 28  --   --    CR 1.17 1.08 0.98   < > 0.96   < > 1.20   < >  --  1.02   < > 1.26*   < > 0.90   < > 0.82   GFRESTIMATED 66 72 82   < > 84   < > 64   < >  --  78   < > 60*   < > 86   < > >90   GFRESTBLACK 76 84 >90   < > >90   < > 74   < >  --  90   < > 70   < > >90   < > >90   POTASSIUM 4.6 3.9 4.1   < > 3.9   < > 3.8   < >  --  4.1   < > 4.8   < > 3.7   < > 4.7   TSH  --   --   --   --  1.74  --   --   --   --   --   --   --   --  1.75  --  2.81    < > = values in this interval not displayed.      BP Readings from Last 3 Encounters:   06/10/20 121/75   06/01/20 136/85   05/19/20 (!) 166/99    Wt Readings from Last 3 Encounters:   06/10/20 119.2 kg (262 lb 11.2 oz)   05/27/20 110.7 kg (244 lb)   05/19/20 115.7 kg (255 lb)                      Reviewed and updated as needed this visit by Provider            Headaches:        Review of Systems   Constitutional, HEENT, cardiovascular, pulmonary, gi and gu systems are negative, except as otherwise noted.      Objective    /75   Pulse 81   Resp 16   Ht 1.702 m (5' 7\")   Wt " 119.2 kg (262 lb 11.2 oz)   SpO2 95%   BMI 41.14 kg/m    Body mass index is 41.14 kg/m .  Physical Exam   GENERAL: over weight and fatigued  HENT: ear canals and TM's normal, nose and mouth without ulcers or lesions  NECK: no adenopathy, no asymmetry, masses, or scars and thyroid normal to palpation  RESP: lungs clear to auscultation - no rales, rhonchi or wheezes  CV: regular rate and rhythm, normal S1 S2, no S3 or S4, no murmur, click or rub, no peripheral edema and peripheral pulses strong  ABDOMEN: soft, nontender, no hepatosplenomegaly, no masses and bowel sounds normal  MS: 2+ edema to his upper shins  BACK: no CVA tenderness, no paralumbar tenderness  Diabetic foot exam: normal DP and PT pulses, tinea pedis and onychomycosis    Procedure:  Clipped great toenails bilaterally and scraped debris into sterile specimen cup    Diagnostic Test Results:  Labs reviewed in Epic        Assessment & Plan     1. Type 2 diabetes mellitus with other specified complication, without long-term current use of insulin (H)  Will stop glipizide, increased small frequent meals and maintain Metformin  Will ask MTM to reach out to him in a week to see if lows have resolved  - **A1C FUTURE anytime; Future  - **Comprehensive metabolic panel FUTURE anytime; Future  - EKG 12-lead complete w/read - Clinics  - **A1C FUTURE anytime  - **Comprehensive metabolic panel FUTURE anytime  - CBC with platelets    2. Benign essential hypertension  BP normal upon recheck here in clinic  Will manage MAXIMO with low dose lasix - start with 10-20 mg daily and stop Chlorthalidone.  Will need to return in 1 week to follow labs given recent KWABENA  - furosemide (LASIX) 20 MG tablet; Take 1 tablet (20 mg) by mouth daily  Dispense: 90 tablet; Refill: 3  - EKG 12-lead complete w/read - Clinics    3. Onychomycosis due to dermatophyte  Clipped toenails today  Sent for culture  Given hepatic disease unclear what ideal treatment would be - might need ot use  topicals  - Orthopedic & Spine  Referral; Future  - Fungus Culture,  skin, hair, or nail    4. Type 2 diabetes mellitus without complication, without long-term current use of insulin (H)  Stopping glipizide  Sounds like he is getting lows in early evening due ti not eating  Will boost small meals and follow BGM  - glipiZIDE (GLUCOTROL XL) 2.5 MG 24 hr tablet; Take 1 tablet (2.5 mg) by mouth daily HOLD FOR NOW; Refill: 0  - Albumin Random Urine Quantitative with Creat Ratio    5. Chest pain, unspecified type  EKG unchanged from prior but given recent hospital stay and concern for HTN DM2 and symptoms will send for stress test and cardiology eval.  - NM Lexiscan stress test; Future    6. Nonspecific abnormal electrocardiogram (ECG) (EKG)     - CARDIOLOGY EVAL ADULT REFERRAL; Future  - NM Lexiscan stress test; Future   Total time was over 40 minutes in face to face consultation with >50% spent in counseling     See Patient Instructions    No follow-ups on file.   1 week to meet with MTM and for repeat labs    Bernie Nazario MD  [facility]

## 2020-06-10 NOTE — Clinical Note
Could you call and ask him to start with only HALF tablet of lasix?  He had recent KWABENA and I want to avoid this.  He could even take this every other day and see how his edema is.  If it resolves he can stop. And use it PRN for 3 days only as needed.    Thanks

## 2020-06-11 ENCOUNTER — TELEPHONE (OUTPATIENT)
Dept: FAMILY MEDICINE | Facility: CLINIC | Age: 65
End: 2020-06-11

## 2020-06-11 LAB
CREAT UR-MCNC: 116 MG/DL
MICROALBUMIN UR-MCNC: 20 MG/L
MICROALBUMIN/CREAT UR: 17.24 MG/G CR (ref 0–17)

## 2020-06-11 NOTE — TELEPHONE ENCOUNTER
Bernie Nazario MD  [facility] [place] [facility]               Could you call and ask him to start with only HALF tablet of lasix?  He had recent KWABENA and I want to avoid this.  He could even take this every other day and see how his edema is.  If it resolves he can stop. And use it PRN for 3 days only as needed.     Thanks     SN

## 2020-06-15 ENCOUNTER — VIRTUAL VISIT (OUTPATIENT)
Dept: CARDIOLOGY | Facility: CLINIC | Age: 65
End: 2020-06-15
Attending: FAMILY MEDICINE
Payer: COMMERCIAL

## 2020-06-15 VITALS
SYSTOLIC BLOOD PRESSURE: 134 MMHG | BODY MASS INDEX: 39.78 KG/M2 | WEIGHT: 254 LBS | HEART RATE: 64 BPM | DIASTOLIC BLOOD PRESSURE: 90 MMHG

## 2020-06-15 DIAGNOSIS — R94.31 NONSPECIFIC ABNORMAL ELECTROCARDIOGRAM (ECG) (EKG): ICD-10-CM

## 2020-06-15 PROCEDURE — 99213 OFFICE O/P EST LOW 20 MIN: CPT | Mod: 95 | Performed by: INTERNAL MEDICINE

## 2020-06-15 RX ORDER — MODAFINIL 200 MG/1
300 TABLET ORAL DAILY
COMMUNITY
Start: 2020-06-08 | End: 2020-11-19

## 2020-06-15 NOTE — LETTER
6/15/2020    Vince Henry MD  [address]  [place] [address]    RE: Parmjit Hernández       Dear Colleague,    I had the pleasure of seeing Parmjit Hernández in the [facility].    Parmjit Hernández is a 64 year old male who is being evaluated via a billable telephone visit.        Mr Hernández is a very pleasant 61-year-old gentleman with history of obesity, history of amyloidosis status post bone marrow transplant, chronic anemia, history of chronic troponin elevation, coronary angiogram in 2018 showing normal coronary arteries, echocardiogram showing normal LV function, diabetes, EKG showing chronic inferior Q waves unchanged on recent EKG.  Patient was seen by Dr. Cavanaugh in April 2020 a few months ago when he was admitted because of GI issues but was noted to have mild troponin elevation and at that time no further cardiac testing was recommended.  More recently it looks like patient was seen by her primary care physician there was some concern about chest discomfort and EKG and a Lexiscan stress test was ordered which patient has not scheduled yet.  Patient tells me that he feels quite well.  Patient tells me that the chest discomfort episode he reported to his primary care physician has completely resolved and he does not feel that it was cardiac in nature.  He described this episode very rare, intermittent lasting for a few seconds to a few minutes sharp in intensity, very mild in nature and he tells me that it feels like these are musculoskeletal in origin.  They are nonexertional, no particular shortness of breath and he specifically denied any sweating along with those episodes.  Patient tells me that that he had this kind of discomfort in the past 2 and actually had coronary angiogram as noted above in 2018 in the setting of mild troponin elevation with normal coronary arteries.  I did look at EKG and they show sinus rhythm with inferior Q waves and I look at EKGs in  2018 and last year 2 and they look similar without any new changes.  Patient tells me that he does not want to undergo stress testing as he feels these episode of chest discomfort which he had very infrequent and intermittently are not cardiac in nature and because of his chronic back issues he does not want to do stress testing as laying down for an extended time would be be  difficult for him.      Assessment plan  A pleasant 64 gentleman some episode of infrequent chest discomfort that sounds very atypical to nonanginal in nature and according to patient completely resolved, nonexertional nature with extensive cardiac work-up in the past including coronary angiogram done 2018 showing normal coronary arteries.  I did have a long discussion with patient regarding the nature of his symptoms.  Patient tells me that the these were very mild short lasting episode and he is almost convinced that these are musculoskeletal in nature.  In fact after long discussion with patient regarding these episodes I agree that they do not sound typical for angina..  Patient does not want to undergo stress testing at this time.  I did discuss with patient that one option is that instead of Lexiscan stress test we can do cardiac MRI stress perfusion as this will decrease chances of a technically difficult test given body habitus and can also provide some opportunity to look at infarcts, although the coronary arteries were normal in 2018.  Patient tells me that due to his back issues he would like to avoid laying down for extended period of time and at this time would prefer not to undergo stress testing which I think is reasonable for now.  However I told him that if he notices any changes in clinical status eg the chest discomfort episodes come back or he notices new symptoms like exertion related symptoms like chest pain, dyspenea he should call the clinic back.  I would prefer a cardiac MRI stress perfusion over Lexiscan stress test  for reasons noted above.  We would be happy to see him in the cardiology clinic on as-needed basis.      Thank you for allowing me to participate in the care of your patient.    Sincerely,     Randell Montague MD     [facility]    cc:   Bernie Nazario MD  [address] [address]

## 2020-06-15 NOTE — RESULT ENCOUNTER NOTE
Hello,    The toenail culture so far has not grown any fungus.  They usually follow this for a few weeks so stay tuned.    The other labs show that your CBC is unchanged.  The electrolytes were normal which is good.  The microalbumin was slightly high.  This means you are spilling a little protein in the urine from the diabetes. This should imp;rove once we titrate your insulin  We can recheck this in a few months.    Bernie Nazario MD

## 2020-06-15 NOTE — PROGRESS NOTES
"Parmjit Hernández is a 64 year old male who is being evaluated via a billable telephone visit.      The patient has been notified of following:     \"This telephone visit will be conducted via a call between you and your physician/provider. We have found that certain health care needs can be provided without the need for a physical exam.  This service lets us provide the care you need with a short phone conversation.  If a prescription is necessary we can send it directly to your pharmacy.  If lab work is needed we can place an order for that and you can then stop by our lab to have the test done at a later time.    Telephone visits are billed at different rates depending on your insurance coverage. During this emergency period, for some insurers they may be billed the same as an in-person visit.  Please reach out to your insurance provider with any questions.    If during the course of the call the physician/provider feels a telephone visit is not appropriate, you will not be charged for this service.\"    Patient has given verbal consent for Telephone visit?  Yes    What phone number would you like to be contacted at? [phone]    How would you like to obtain your AVS? Mail a copy    Phone call duration: 15 minutes    Randell Montague MD          Review Of Systems  Skin: actively sees Derm, fungal infections and psorisis  Eyes: negative  Ears/Nose/Throat: negative  Respiratory: negative  Cardiovascular: chest pain and mild chest pain but no more of these episodes in last few weeks,  Gastrointestinal: reflux  Genitourinary: urgency  Musculoskeletal: back pain and sever back pain and surgery is needed   Neurologic: migraine headaches, headaches and they come and go   Psychiatric: excessive stress, anxiety and depression  Hematologic/Lymphatic/Immunologic: negative  Endocrine: diabetes    Patient reported vitals:  BP:134/90  Heart rate:64  Weight:254lb    Barb Mcconnell [facility]        Mr Hernández is a very pleasant 61-year-old " gentleman with history of obesity, history of amyloidosis status post bone marrow transplant, chronic anemia, history of chronic troponin elevation, coronary angiogram in 2018 showing normal coronary arteries, echocardiogram showing normal LV function, diabetes, EKG showing chronic inferior Q waves unchanged on recent EKG.  Patient was seen by Dr. Cavanaugh in April 2020 a few months ago when he was admitted because of GI issues but was noted to have mild troponin elevation and at that time no further cardiac testing was recommended.  More recently it looks like patient was seen by her primary care physician there was some concern about chest discomfort and EKG and a Lexiscan stress test was ordered which patient has not scheduled yet.  Patient tells me that he feels quite well.  Patient tells me that the chest discomfort episode he reported to his primary care physician has completely resolved and he does not feel that it was cardiac in nature.  He described this episode very rare, intermittent lasting for a few seconds to a few minutes sharp in intensity, very mild in nature and he tells me that it feels like these are musculoskeletal in origin.  They are nonexertional, no particular shortness of breath and he specifically denied any sweating along with those episodes.  Patient tells me that that he had this kind of discomfort in the past 2 and actually had coronary angiogram as noted above in 2018 in the setting of mild troponin elevation with normal coronary arteries.  I did look at EKG and they show sinus rhythm with inferior Q waves and I look at EKGs in 2018 and last year 2 and they look similar without any new changes.  Patient tells me that he does not want to undergo stress testing as he feels these episode of chest discomfort which he had very infrequent and intermittently are not cardiac in nature and because of his chronic back issues he does not want to do stress testing as laying down for an extended time  would be be  difficult for him.      Assessment plan  A pleasant 64 gentleman some episode of infrequent chest discomfort that sounds very atypical to nonanginal in nature and according to patient completely resolved, nonexertional nature with extensive cardiac work-up in the past including coronary angiogram done 2018 showing normal coronary arteries.  I did have a long discussion with patient regarding the nature of his symptoms.  Patient tells me that the these were very mild short lasting episode and he is almost convinced that these are musculoskeletal in nature.  In fact after long discussion with patient regarding these episodes I agree that they do not sound typical for angina..  Patient does not want to undergo stress testing at this time.  I did discuss with patient that one option is that instead of Lexiscan stress test we can do cardiac MRI stress perfusion as this will decrease chances of a technically difficult test given body habitus and can also provide some opportunity to look at infarcts, although the coronary arteries were normal in 2018.  Patient tells me that due to his back issues he would like to avoid laying down for extended period of time and at this time would prefer not to undergo stress testing which I think is reasonable for now.  However I told him that if he notices any changes in clinical status eg the chest discomfort episodes come back or he notices new symptoms like exertion related symptoms like chest pain, dyspenea he should call the clinic back.  I would prefer a cardiac MRI stress perfusion over Lexiscan stress test for reasons noted above.  We would be happy to see him in the cardiology clinic on as-needed basis.    Randell Montague MD

## 2020-06-18 NOTE — RESULT ENCOUNTER NOTE
Hello,    I think the culture will likely not grow any fungus.  Lets see if closely trimming the nails helps and if the nails become thicker we can consider treatment.  Perhaps a topical lacquer would be best.      Bernie Nazario MD

## 2020-06-22 ENCOUNTER — TELEPHONE (OUTPATIENT)
Dept: WOUND CARE | Facility: CLINIC | Age: 65
End: 2020-06-22

## 2020-06-22 NOTE — TELEPHONE ENCOUNTER
M [facility] Wound    Who is the name of the provider?:  Marivel      What is the location you see this provider at?: [place]    Reason for call:  Reschedule appt missed on 6/17.  Missed due to illness.     Can we leave a detailed message on this number?  YES

## 2020-06-23 ENCOUNTER — TRANSFERRED RECORDS (OUTPATIENT)
Dept: HEALTH INFORMATION MANAGEMENT | Facility: CLINIC | Age: 65
End: 2020-06-23

## 2020-06-23 ENCOUNTER — ALLIED HEALTH/NURSE VISIT (OUTPATIENT)
Dept: PHARMACY | Facility: CLINIC | Age: 65
End: 2020-06-23

## 2020-06-23 ENCOUNTER — HOSPITAL ENCOUNTER (OUTPATIENT)
Dept: NUTRITION | Facility: CLINIC | Age: 65
Discharge: HOME | End: 2020-06-23
Attending: DIETITIAN, REGISTERED | Admitting: DIETITIAN, REGISTERED
Payer: COMMERCIAL

## 2020-06-23 DIAGNOSIS — F32.A ANXIETY AND DEPRESSION: ICD-10-CM

## 2020-06-23 DIAGNOSIS — F41.9 ANXIETY AND DEPRESSION: ICD-10-CM

## 2020-06-23 DIAGNOSIS — Z78.9 TAKES DIETARY SUPPLEMENTS: ICD-10-CM

## 2020-06-23 DIAGNOSIS — I10 HYPERTENSION GOAL BP (BLOOD PRESSURE) < 140/90: ICD-10-CM

## 2020-06-23 DIAGNOSIS — G63 POLYNEUROPATHY ASSOCIATED WITH UNDERLYING DISEASE (H): ICD-10-CM

## 2020-06-23 DIAGNOSIS — E78.5 HYPERLIPIDEMIA LDL GOAL <100: ICD-10-CM

## 2020-06-23 DIAGNOSIS — R53.82 CHRONIC FATIGUE: ICD-10-CM

## 2020-06-23 DIAGNOSIS — N40.0 BENIGN PROSTATIC HYPERPLASIA, UNSPECIFIED WHETHER LOWER URINARY TRACT SYMPTOMS PRESENT: ICD-10-CM

## 2020-06-23 DIAGNOSIS — F31.9 BIPOLAR I DISORDER (H): ICD-10-CM

## 2020-06-23 DIAGNOSIS — E11.69 TYPE 2 DIABETES MELLITUS WITH OTHER SPECIFIED COMPLICATION, WITHOUT LONG-TERM CURRENT USE OF INSULIN (H): Primary | ICD-10-CM

## 2020-06-23 DIAGNOSIS — G89.29 OTHER CHRONIC PAIN: ICD-10-CM

## 2020-06-23 DIAGNOSIS — G43.919 INTRACTABLE MIGRAINE WITHOUT STATUS MIGRAINOSUS, UNSPECIFIED MIGRAINE TYPE: ICD-10-CM

## 2020-06-23 PROCEDURE — 99607 MTMS BY PHARM ADDL 15 MIN: CPT | Performed by: PHARMACIST

## 2020-06-23 PROCEDURE — 97803 MED NUTRITION INDIV SUBSEQ: CPT | Mod: TEL | Performed by: DIETITIAN, REGISTERED

## 2020-06-23 PROCEDURE — 99606 MTMS BY PHARM EST 15 MIN: CPT | Performed by: PHARMACIST

## 2020-06-23 RX ORDER — AMOXICILLIN 250 MG
1 CAPSULE ORAL EVERY MORNING
COMMUNITY
End: 2021-01-06

## 2020-06-23 RX ORDER — ATORVASTATIN CALCIUM 20 MG/1
20 TABLET, FILM COATED ORAL DAILY
Qty: 90 TABLET | Refills: 1 | Status: SHIPPED | OUTPATIENT
Start: 2020-06-23 | End: 2021-02-17

## 2020-06-23 RX ORDER — METFORMIN HCL 500 MG
TABLET, EXTENDED RELEASE 24 HR ORAL
Qty: 360 TABLET | Refills: 1 | Status: SHIPPED | OUTPATIENT
Start: 2020-06-23 | End: 2021-02-08

## 2020-06-23 RX ORDER — TURMERIC 400 MG
1200 CAPSULE ORAL DAILY
COMMUNITY
End: 2023-02-23

## 2020-06-23 RX ORDER — CAFFEINE 200 MG
400 TABLET ORAL DAILY
COMMUNITY
End: 2020-11-19

## 2020-06-23 NOTE — PROGRESS NOTES
"Parmjit Hernández is a 64 year old male who is being evaluated via a billable telephone visit.      The patient has been notified of following:     \"This telephone visit will be conducted via a call between you and your physician/provider. We have found that certain health care needs can be provided without the need for a physical exam.  This service lets us provide the care you need with a short phone conversation.  If a prescription is necessary we can send it directly to your pharmacy.  If lab work is needed we can place an order for that and you can then stop by our lab to have the test done at a later time.    Telephone visits are billed at different rates depending on your insurance coverage. During this emergency period, for some insurers they may be billed the same as an in-person visit.  Please reach out to your insurance provider with any questions.    If during the course of the call the physician/provider feels a telephone visit is not appropriate, you will not be charged for this service.\"    Patient has given verbal consent for Telephone visit?  Yes      OUTPATIENT NUTRITION REASSESSMENT  REASON FOR ASSESSMENT  Parmjit Hernández referred by Dr. Booker for MNT related to   Morbid obesity (H) [E66.01]  - Primary        Other cirrhosis of liver (H) possibly from vences  [K74.69]        Type 2 diabetes mellitus with other specified complication, without long-term current use of insulin (H) [E11.69]           Patient accompanied by self     ASSESSMENT   Nutrition History:  - Information obtained from patient.  - Patient is on a regular diet at home. Patient states was diagnosed with diabetes 15 years ago but has not spoken with dietitian.  Patient states he has difficulty preparing meals due to the pain while standing.  Patient is hoping to hire PCA to prepare meals for him.  Patient shops multiple times per week and buys prepared items from deli or frozen foods.  Patient states does not have large palette when " "eating vegetables.  Patient likes carrots, corn, broccoli, peas, french fries.  Patient receiving COVID meals from his health insurance (3 meals per week). Patient was told he needs to lose 10% body weight in 1 year to help with cirrhosis. Patient states struggles mid-night snacking.  Patient bought Mrs. Huertas to add to vegetables.    .  Diet Recall:  Breakfast: eggs with shredded cheese + KIND Bar + Banana (9:00 AM)   Lunch: skips   Snack: [employer] foods quinoa salad, broccoli/raisin/ salad, craisins, cashews ([employer] del[employer] for salads) 2:00 PM   Dinner: no name parmesan chicken breast, 1/2 plate potatoes and corn, peas or broccoli; jalapeno cheddar bits from [employer] + bagel salami sauce (7:00 PM) or parmesan frozen chicken breast   Snack: bagel sandwich (salami, provalone, banana peppers, cajun sunshine sauce) 12:00 AM  Beverages: diet soda (Diet Pepsi); water   Dining out: limited due to COVID-19      Exercise: Patient walking daily during past week.      NUTRITION FOCUSED PHYSICAL ASSESSMENT (NFPA) FOR DIAGNOSING MALNUTRITION  No:  Unable to assess due to telephone visit due to COVID-19 pandemic           Observed:   Unable to assess due to telephone visit due to COVID-19 pandemic      Obtained from Chart/Interdisciplinary Team:  None noted      LABS  Labs reviewed     MEDICATIONS  Medications reviewed     ANTHROPOMETRICS   Height: 5'7\"  Weight: 254 lbs   BMI (kg/m2): 39.7 kg/m2   Weight Status:  Obesity Grade II BMI 35-39.9  %IBW: 164%  Weight History: Patient with 10% weight loss in 2 months (fluid balance)   Wt Readings from Last 10 Encounters:   06/15/20 115.2 kg (254 lb)   06/10/20 119.2 kg (262 lb 11.2 oz)   05/27/20 110.7 kg (244 lb)   05/19/20 115.7 kg (255 lb)   04/23/20 111.4 kg (245 lb 9.6 oz)   04/18/20 114.4 kg (252 lb 4.8 oz)   03/19/20 121.1 kg (267 lb)   03/05/20 122.5 kg (270 lb)   02/24/20 118.6 kg (261 lb 6.4 oz)   02/10/20 121.1 kg (267 lb)     ASSESSED NUTRITION NEEDS  Estimated Energy Needs: " 0150-5766 kcals/day (14-17 Kcal/Kg)  Justification:  (obese)  Estimated Protein Needs: 67-80 grams protein/day (1-1.2 g pro/Kg)  Justification:  (maintenance)  Estimated Fluid Needs: per MD      ASSESSED MALNUTRITION STATUS  % Weight Loss:  >5% weight loss in 1 month (severe malnutrition) noted hospitalization and fluid balance   % Intake:  No decreased intake noted  Subcutaneous Fat Loss:  None observed due to telephone visit due to COVID-19 pandemic   Loss of Muscle Mass:  None observed due to telephone visit due to COVID-19 pandemic   Fluid Retention:  Mild per patient (describes swollen calves at the end of the day if does not wear compression socks)     Malnutrition Diagnosis:  Patient does not meet two of the above criteria necessary for diagnosing malnutrition    EVALUATION/PROGRESS TOWARDS GOALS  Previous nutrition goals:  Monitor carbohydrates and sodium intake by reading labels-improving      Previous nutrition diagnosis: Food and nutrition-related knowledge deficit related to lack of prior exposure as evidenced by no previous referral for nutrition education-improving      Current nutrition diagnosis: Food and nutrition-related knowledge deficit related to lack of prior exposure as evidenced by no previous referral for nutrition education       INTERVENTIONS   Nutrition Prescription   Recommend modified carbohydrate diet 165-180 grams carbohydrate for weight loss and diabetes control; 2400 mg sodium due to hypertension      IMPLEMENTATION   Assessed learning needs and learning preference  Teaching Method(s) used: Explanation  Diet Education:  Provided education on weight loss, modified carbohydrate, low sodium diet   Nutrition Education (Content):              a)  Discussed progress towards goals and current intake.  Reviewed portion sizes, label reading, carbohydrate counting and behavior modification.  Congratulated patient for reading labels.  Discussed plate method for foods (1/2 plate non starchy  vegetables, 1/4 protein and 1/4 carbohydrate.  Encouraged patient to make gradual diet changes for long term weight loss success especially since patient has 80 lbs 2 times during lifetime with then weight regain.  Supported patient with his struggle with diet and weight loss.   Nutrition Education (Application):              a)  Discussed starchy vegetables potatoes, corn, peas and squash and carbohydrate content of foods.               b)  Patient verbalizes understanding of diet by stating will start reducing portion sizes at dinner.   Anticipate fair-good compliance      GOALS (Patient determined)  Reduce portion sizes at dinner      FOLLOW UP/MONITORING   Progress towards goals will be monitored and evaluated per protocol and Practice Guidelines  Patient to follow up in 4 weeks  RD name and number provided      Time Spent with Patient  30 minutes     Renan Arrington, RD, LD  [facility] Outpatient Dietitian  [phone] (office phone)

## 2020-06-23 NOTE — TELEPHONE ENCOUNTER
Left message for patient. He did not have an appointment at our office, Erickson had an appointment with [facility] on 6/17/20 at 10:30 AM. To make a change, call [phone]. I left him a message with the [facility] number to call and reschedule, otherwise he could make an appointment with Dr. Orta at our office on 7-.

## 2020-06-23 NOTE — PROGRESS NOTES
MTM ENCOUNTER  SUBJECTIVE/OBJECTIVE:                           Parmjit Hernández is a 64 year old male called for an initial visit. He was referred to me from Dr. Nazario.      Patient consented to a telehealth visit: yes  Telemedicine Visit Details  Type of service:  Telephone visit  Start Time: 9:31 AM  End Time: 10:16 AM  Originating Location (pt. Location): Home  Distant Location (provider location):  [facility] MTM  Mode of Communication:  Telephone    Chief Complaint: Medication Therapy Management.    Allergies/ADRs: Reviewed in EHR  Tobacco:  reports that he has never smoked. He has never used smokeless tobacco.  Alcohol: rare use   Caffeine: takes a caffeine pill daily and two diet sodas   Activity: Very low because of back pain, walks daily  PMH: Reviewed in EHR    Medication Adherence/Access: no issues reported    Type 2 Diabetes:  Pt currently taking metformin XR 1500 mg daily. Glipizide recently stopped due to hypoglycemia. Pt is not experiencing side effects. Pt would like to increase his metformin, because he is nervous about having his fasting blood sugar in the 130s and is used to seeing them in the 100s.   Patient was hospitalized earlier this year due to hypoglycemia.   SMBG: one time(s) daily. Ranges (patient reported): Averages in the 130s  Symptoms of low blood sugar? weak, dizzy,  Frequency of lows- last happened 2 weeks ago, went down to 67.   Symptoms of high blood sugar? none  Eye exam: up to date  Foot exam: up to date  Diet/Exercise: No recent diet changes. Walks ~10 minutes daily.   Aspirin: Taking 81mg daily and denies side effects  Statin: Yes: atorvastatin   ACEi/ARB: Yes: lisinopril.   Urine Albumin:   Lab Results   Component Value Date    UMALCR 17.24 (H) 06/10/2020     Lab Results   Component Value Date    A1C 5.5 06/10/2020    A1C 5.5 04/16/2020    A1C 7.4 01/09/2020    A1C 5.4 07/31/2019    A1C 4.7 05/10/2019       Neuropathy: Primarily in his feet. Current therapy  "includes Lyrica 100 mg four times daily (patient only taking three times daily). He believes his peripheral neuropathy is primarily a result from prior chemotherapy. Patient finds this medication beneficial, reports it is \"a miracle drug\" and denies side side effects.     Chronic Fatigue: Current medications include modafinil 200 mg once daily and caffeine 400 mg daily. Pt finds Modafinil  helpful, but not as beneficial as when he first started. Denies side effects. Reports the caffeine pill helps immensely.  Denies difficulty sleeping.     Hyperlipidemia: Current therapy includes atorvastatin 10 mg once daily and Lovaza 1 g daily.  Pt reports no significant myalgias or other side effects.  Recent Labs   Lab Test 02/24/20  1219 05/13/19  1153  09/01/15  1201 12/03/14  1015   CHOL 163 119   < > 195 142   HDL 32* 26*   < > 33* 25*   * 39   < > 140* 71   TRIG 121 269*   < > 109 232*   CHOLHDLRATIO  --   --   --  5.9* 5.7*    < > = values in this interval not displayed.       Hypertension/Edema: Current medications include furosemide 20 mg daily, lisinopril 20 mg daily, and metoprolol succinate 50 mg daily.  Patient does self-monitor BP. Home BP recent readings: 153/94, 149/86, 134/90, 110/68, 143/71. Patient reports occasional dizziness in the evenings, happens rarely, resolves with food. Patient has found the furosemide very helpful for his edema, he hasn't needed to wear his compression stockings as much lately.   He was in the ED earlier this year due to hypotension, felt dizzy and weak when this happened, hasn't happened at all recently.  BP Readings from Last 3 Encounters:   06/15/20 (!) 134/90   06/10/20 121/75   06/01/20 136/85     Last Comprehensive Metabolic Panel:  Sodium   Date Value Ref Range Status   06/10/2020 138 133 - 144 mmol/L Final     Potassium   Date Value Ref Range Status   06/10/2020 4.6 3.4 - 5.3 mmol/L Final     Chloride   Date Value Ref Range Status   06/10/2020 109 94 - 109 mmol/L " Final     Carbon Dioxide   Date Value Ref Range Status   06/10/2020 23 20 - 32 mmol/L Final     Anion Gap   Date Value Ref Range Status   06/10/2020 6 3 - 14 mmol/L Final     Glucose   Date Value Ref Range Status   06/10/2020 162 (H) 70 - 99 mg/dL Final     Urea Nitrogen   Date Value Ref Range Status   06/10/2020 24 7 - 30 mg/dL Final     Creatinine   Date Value Ref Range Status   06/10/2020 1.17 0.66 - 1.25 mg/dL Final     GFR Estimate   Date Value Ref Range Status   06/10/2020 66 >60 mL/min/[1.73_m2] Final     Comment:     Non  GFR Calc  Starting 12/18/2018, serum creatinine based estimated GFR (eGFR) will be   calculated using the Chronic Kidney Disease Epidemiology Collaboration   (CKD-EPI) equation.       Calcium   Date Value Ref Range Status   06/10/2020 8.3 (L) 8.5 - 10.1 mg/dL Final     Bilirubin Total   Date Value Ref Range Status   06/10/2020 0.4 0.2 - 1.3 mg/dL Final     Alkaline Phosphatase   Date Value Ref Range Status   06/10/2020 69 40 - 150 U/L Final     ALT   Date Value Ref Range Status   06/10/2020 27 0 - 70 U/L Final     AST   Date Value Ref Range Status   06/10/2020 18 0 - 45 U/L Final       Bipolar I/Anxiety/Depression: Current medications include: Latuda 40 mg at bedtime, and depakote 1000 mg daily. He follows regularly with a psychiatrist, every 2 or 3 months. He also has a therapist he talks to every 3 weeks. He finds these medications beneficial, reports it is difficult to say if they give him side effects. Pt reports that depression symptoms are worsened. Last week he had 4 days where he didn't want to get out of bed, combination of depression/anxiety and boredom due to quarantine. 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 He denies suicidal thoughts. Pt endorses he does not want to change any of his mental health medications or doses.   PHQ-9 SCORE 5/25/2017 5/16/2018 5/29/2019   PHQ-9 Total Score MyChart - - 4  "(Minimal depression)   PHQ-9 Total Score 14 16 4     Lab Results   Component Value Date    LARYR 31 (L) 04/18/2020     Pain: Pain is primarily in his back and left hip. Patient has a pain pump, which infuses bupivacaine, fentanyl, and morphine. He takes Senna S one tablet in the AM and two tablets PM. He reports his bowel movements have been regular on this regimen. Patient follows regularly with the pain clinic. Today, he is complaining of back pain. Pt is also taking ibuprofen 1600 - 2400 mg daily.     Migraine: Current medications include sumatriptan 50 mg tablet PRN. Patient reports he needs this very rarely, if ever. He says \"I don't get migraines any more\". He reports that he does some stretching that has really helped reduce his migraine severity and frequency.     BPH: Current medications include tamsulosin 0.4 mg daily. Pt reports this is \"marginally\" effective, pt reports he recently saw urology and states he is \"peeing better\". Still complains of incomplete voiding. Denies side effects.     Supplements: Current medications include a daily MVI, alpha lipoic acid 200 mg daily, turmeric 500 mg daily, caffeine 400 mg daily in the morning, vitamin C 1000 mg daily. Pt finds these effective without side effects.     Today's Vitals: There were no vitals taken for this visit. - telephone visit    ASSESSMENT:                            Medication Adherence: good, no issues identified    Type 2 Diabetes: Stable. Patient is meeting A1c goal of < 7%. Self monitoring of blood glucose is at goal of fasting  mg/dL. Pt would like to increase his metformin, which is reasonable given recent glipizide stop and that metformin doesn't cause low blood sugar.     Neuropathy: Stable.     Chronic Fatigue: Needs improvement. Pt would benefit from stopping his caffeine pill, given his hypertension. Patient declines, as he finds it very beneficial.     Hyperlipidemia: Needs Improvement. LDL is not at goal of <70. Patient is " taking modafinil, which is a CY inducer and will decrease the serum concentration of atorvastatin. He would benefit from an atorvastatin dose increase.     Hypertension: Needs Improvement. Patient is not meeting BP goal of < 140/90mmHg ~50% of the time. Pt would benefit from closely monitoring his BP for the next few weeks. Will hold off on medication changes at this time, given recent hypotensive issues.     Bipolar I/Anxiety/Depression: Patient's depression has worsened, but he declines medication changes today.  Patient follows closely with psychiatry and a therapist.     Pain: Needs improvement. Patient would benefit from stopping ibuprofen, given his hypertension and recent KWABENA. Advised patient to follow-up with the pain clinic on other pain management options, has follow-up scheduled in July.     Migraine: Improved.     BPH: Stable.     Supplements: Advised patient to stop caffeine, as noted previously.     PLAN:                            1. Increase metformin XR dose to 1000 mg twice daily.     2. Increase Atorvastatin dose to 20 mg once daily.     2. Stop taking ibuprofen.     I spent 45 minutes with this patient today. All changes were made via collaborative practice agreement with Dr. Nazario. A copy of the visit note was provided to the patient's primary care provider.    Will follow up in 2 weeks, visit scheduled.    The patient was sent via [email or web address] a summary of these recommendations.     Bernie Cyr PharmD  PGY1 Medication Therapy Management Resident   [phone]    Preceptor cosignature: Parmjit Hernández was seen independently by Dr. Cyr. I have reviewed the assessment and plan. Minda Smith, GabinoD, TOSHIA, BCACP

## 2020-06-24 ENCOUNTER — PATIENT OUTREACH (OUTPATIENT)
Dept: CARE COORDINATION | Facility: CLINIC | Age: 65
End: 2020-06-24

## 2020-06-24 ASSESSMENT — ACTIVITIES OF DAILY LIVING (ADL): DEPENDENT_IADLS:: INDEPENDENT

## 2020-06-24 NOTE — LETTER
Kapil Chaideze,     [facility], [phone]    Here are three agencies that provide companionship, cleaning, meal prep and other services.   1. [facility], [phone]  2.     . [facility]     [address], [address], [place], [address] [phone]    3. [facility]  [address], [address], [place], [place], [address] [phone]    Why don't you take a look and call to find out details.  The hardest part these days is to find staff to do this work.  I wish you well and let me know if you have any questions or concerns.  Our community health worker will call you in a month to see how you are doing.      Thanks, Xochilt

## 2020-06-24 NOTE — PROGRESS NOTES
Clinic Care Coordination Contact  [facility]/Voicemail       Clinical Data: Care Coordinator Outreach  Outreach attempted x 1.  Left message on patient's voicemail with call back information and requested return call.  VM from patient left.  Called patient again and left VM.  Plan:  Care Coordinator delegates to CHW to do reach patient again in 10 business days.  Social Maribell Calderón  [facility]  [phone]    Clinic Care Coordination Contact    Follow Up Progress Note      Assessment: Talked to patient.  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 He still would like to find someone to do cleaning, meal prep and clean up.  He would like to use an agency versus hiring privately. He is comfortable with using a senior care agency.  Writer will send three options via My Chart.     Goals addressed this encounter:   Goals Addressed                 This Visit's Progress       Patient Stated      Psychosocial (pt-stated)   On track     Goal Statement: I would like to look into getting a PCA in the next 2 months  Date Goal set: 5/13/20  Barriers: Eligibility  Strengths: Motivated  Date to Achieve By: 2 months  Patient expressed understanding of goal: Yes  Action steps to achieve this goal:  1.  called [employer] who confirms patient does not have this option for coverage.  2.  will send some low-cost home making options  3. I will update CHW during outreach calls and ask to speak with SW if needed              Intervention/Education provided during outreach: Discussed cleaning resources. Sent through My Chart [facility], [facility], and [facility].           Plan:   Patient will look at resources sent and will call if interested.   Care Coordinator will follow up in 45 days, delegating to CHW to contact in one month.     Social Maribell Calderón  [facility]  [phone]

## 2020-06-26 ENCOUNTER — TRANSFERRED RECORDS (OUTPATIENT)
Dept: HEALTH INFORMATION MANAGEMENT | Facility: CLINIC | Age: 65
End: 2020-06-26

## 2020-06-29 ENCOUNTER — OFFICE VISIT (OUTPATIENT)
Dept: FAMILY MEDICINE | Facility: CLINIC | Age: 65
End: 2020-06-29
Payer: COMMERCIAL

## 2020-06-29 VITALS
BODY MASS INDEX: 40.04 KG/M2 | SYSTOLIC BLOOD PRESSURE: 158 MMHG | OXYGEN SATURATION: 99 % | DIASTOLIC BLOOD PRESSURE: 94 MMHG | WEIGHT: 255.1 LBS | RESPIRATION RATE: 14 BRPM | HEIGHT: 67 IN | HEART RATE: 84 BPM

## 2020-06-29 DIAGNOSIS — H92.02 LEFT EAR PAIN: ICD-10-CM

## 2020-06-29 DIAGNOSIS — B35.1 ONYCHOMYCOSIS: ICD-10-CM

## 2020-06-29 DIAGNOSIS — N17.9 AKI (ACUTE KIDNEY INJURY) (H): Primary | ICD-10-CM

## 2020-06-29 DIAGNOSIS — I10 HYPERTENSION GOAL BP (BLOOD PRESSURE) < 140/90: ICD-10-CM

## 2020-06-29 DIAGNOSIS — G89.29 CHRONIC BACK PAIN, UNSPECIFIED BACK LOCATION, UNSPECIFIED BACK PAIN LATERALITY: ICD-10-CM

## 2020-06-29 DIAGNOSIS — M54.9 CHRONIC BACK PAIN, UNSPECIFIED BACK LOCATION, UNSPECIFIED BACK PAIN LATERALITY: ICD-10-CM

## 2020-06-29 DIAGNOSIS — R60.0 BILATERAL LEG EDEMA: ICD-10-CM

## 2020-06-29 PROCEDURE — 80048 BASIC METABOLIC PNL TOTAL CA: CPT | Performed by: FAMILY MEDICINE

## 2020-06-29 PROCEDURE — 83735 ASSAY OF MAGNESIUM: CPT | Performed by: FAMILY MEDICINE

## 2020-06-29 PROCEDURE — 99215 OFFICE O/P EST HI 40 MIN: CPT | Performed by: FAMILY MEDICINE

## 2020-06-29 PROCEDURE — 36415 COLL VENOUS BLD VENIPUNCTURE: CPT | Performed by: FAMILY MEDICINE

## 2020-06-29 RX ORDER — CICLOPIROX 80 MG/ML
SOLUTION TOPICAL
Qty: 6 ML | Refills: 3 | Status: SHIPPED | OUTPATIENT
Start: 2020-06-29 | End: 2020-07-27

## 2020-06-29 RX ORDER — LISINOPRIL 20 MG/1
30 TABLET ORAL DAILY
Qty: 135 TABLET | Refills: 3 | Status: SHIPPED | OUTPATIENT
Start: 2020-06-29 | End: 2020-08-24

## 2020-06-29 ASSESSMENT — PAIN SCALES - GENERAL: PAINLEVEL: MODERATE PAIN (5)

## 2020-06-29 ASSESSMENT — MIFFLIN-ST. JEOR: SCORE: 1910.76

## 2020-06-29 NOTE — PROGRESS NOTES
Subjective     Parmjit Hernández is a 64 year old male who presents to clinic today for the following health issues:    HPI     Patient in for multiple reasons, would like to discuss medications, chest pain status, states no longer having chest pains. Patient thinks he pulled a muscle on right side of chest.     Diabetes Follow-up    How often are you checking your blood sugar? One time daily blood sugars have been excellent.  He brings in a log of his blood sugars over the last month and his fasting sugars have ranged from 92 122 with a few outliers in the 150s 180s.  He is done a few postpartum blood sugars which were 104 145 and 155.  He denies any low blood sugars at her last visit he raised his metformin 2000 mg twice daily and has held the glipizide and this does seem to be working for him.  What time of day are you checking your blood sugars (select all that apply)?  Before meals  Have you had any blood sugars above 200?  No  Have you had any blood sugars below 70?  No    What symptoms do you notice when your blood sugar is low?  Dizzy and Weak    What concerns do you have today about your diabetes? None and Other: medications     Do you have any of these symptoms? (Select all that apply)  Numbness in feet and Burning in feet due to neuropathy              Hyperlipidemia Follow-Up      Are you regularly taking any medication or supplement to lower your cholesterol?   Yes- atorvastatin    Are you having muscle aches or other side effects that you think could be caused by your cholesterol lowering medication?  Yes but doesn't think its from the atorvastatin    Hypertension Follow-up      Do you check your blood pressure regularly outside of the clinic? Yes     Are you following a low salt diet? Yes    Are your blood pressures ever more than 140 on the top number (systolic) OR more   than 90 on the bottom number (diastolic), for example 140/90? Yes    BP Readings from Last 2 Encounters:   06/29/20 (!) 158/94    06/15/20 (!) 134/90     Hemoglobin A1C (%)   Date Value   06/10/2020 5.5   04/16/2020 5.5     LDL Cholesterol Calculated (mg/dL)   Date Value   02/24/2020 107 (H)   05/13/2019 39   His blood pressures while at home are also tally over the last month and his pressures have ranged from 120s to 140s over 80s however he has had some higher values in the 150-160 range.  He did start using 20 mg of furosemide and this has significantly helped with lower extremity swelling but has not really helped with his blood pressure.  It is still slightly above goal.  He is taking Toprol 50 mg daily but is noted to have a pulse in the mid 60s to low 70s most of the time.  And is taking 20 mg of lisinopril and recently had acute kidney injury possibly due to diarrhea and dehydration and poor oral intake but also could be related to lisinopril use.    His last renal panel was improved would like to recheck it today    He is having increased back pain and is seeing a new specialist Dr. Resendez with [facility] they have plans for possible joint injection back injections Wednesday    He is also complained of some ear pain in the last few weeks he noted a picking sensation in his deep left ear it seemed to merge into ear pain with chewing and last night was significantly bothersome but today is gone.  He did make an appointment with an ENT but needs a referral if this does return    He has had some chronic diarrhea this has resolved and this could be due to narcotic dependence and withdrawal which he has gone through in the past.  He usually uses senna because of chronic constipation associated with narcotic use.  Currently he seems to have softer stools that are moving and stable.    He wonders what else he can do for the nail changes of his great toe.  See previous note at his last visit we address this and I was not able to gather adequate debris.  The culture did return negative but based on his toenail appearance it does  look as if he has clinical onychomycosis.  We discussed that oral medications are potentially liver toxic and therefore would like to avoid this.  Discussed some topical options he can consider      Lastly he has plans for a repeat upper GI with the hepatic clinic because of his cirrhotic liver      Patient Active Problem List   Diagnosis     Obstructive sleep apnea     Hyperlipidemia LDL goal <100     Hypertension goal BP (blood pressure) < 140/90     Advanced directives, counseling/discussion     Migraine     History of diverticulitis     MENTAL HEALTH     Marianne (H)     Bipolar I disorder (H)     Chronic abdominal pain     Anxiety     Other amyloidosis (H)     Insomnia due to medical condition     Narcotic dependence (H)     Obstructive sleep apnea syndrome     Type 2 diabetes mellitus without complication, without long-term current use of insulin (H)     Restless legs syndrome (RLS)     Type 2 diabetes mellitus with other specified complication (H)     Peripheral edema     Morbid obesity (H)     Stasis dermatitis of both legs     Polyneuropathy associated with underlying disease (H)     Acquired lymphedema     Renal failure     Low blood pressure reading     History of peripheral stem cell transplant (H)     Anxiety and depression     Other cirrhosis of liver (H) possibly from vences      Diarrhea of presumed infectious origin     Diarrhea     KWABENA (acute kidney injury) (H)     Acute kidney failure, unspecified (H)     EKG abnormality     Bilateral leg edema     Onychomycosis     Past Surgical History:   Procedure Laterality Date     BMT PROTOCOL      for systemic amyloidosis     BONE MARROW BIOPSY, BONE SPECIMEN, NEEDLE/TROCAR N/A 6/8/2016    Procedure: BIOPSY BONE MARROW;  Surgeon: Nathan Agrawal MD;  Location:  GI     BONE MARROW BIOPSY, BONE SPECIMEN, NEEDLE/TROCAR N/A 2/20/2019    Procedure: BIOPSY BONE MARROW;  Surgeon: Michael Raygoza MD;  Location:  GI     C NONSPECIFIC PROCEDURE  94     Cholecystectomy     C NONSPECIFIC PROCEDURE  2000    repair deviated septum     CHOLECYSTECTOMY  1995    lap qian     COLONOSCOPY  2012    hx polyps     ESOPHAGOSCOPY, GASTROSCOPY, DUODENOSCOPY (EGD), COMBINED N/A 5/29/2015    Procedure: COMBINED ESOPHAGOSCOPY, GASTROSCOPY, DUODENOSCOPY (EGD), BIOPSY SINGLE OR MULTIPLE;  Surgeon: John Jacob MD;  Location: [facility]     HERNIA REPAIR, UMBILICAL  2006       Social History     Tobacco Use     Smoking status: Never Smoker     Smokeless tobacco: Never Used   Substance Use Topics     Alcohol use: No     Alcohol/week: 0.0 standard drinks     Family History   Problem Relation Age of Onset     Cerebrovascular Disease Mother      C.A.D. Mother      Hypertension Mother      Alcohol/Drug Mother      Arthritis Mother      Cerebrovascular Disease Maternal Grandmother      C.A.D. Maternal Grandmother      Alcohol/Drug Maternal Grandmother      C.A.D. Father      Heart Disease Father      Hypertension Father      Alcohol/Drug Father      Allergies Father      Circulatory Father      Depression Father      Respiratory Father      Asthma Father      Alcohol/Drug Paternal Grandfather      Cerebrovascular Disease Paternal Grandfather      Depression Son      Psychotic Disorder Son         anxiety disorder     Depression Daughter      Cerebrovascular Disease Maternal Grandfather      Cerebrovascular Disease Paternal Grandmother      Diabetes Brother      Allergies Sister      Depression Sister      Gynecology Sister          Current Outpatient Medications   Medication Sig Dispense Refill     alpha-lipoic acid 100 MG capsule Take 200 mg by mouth daily       aspirin (ASPIRIN LOW DOSE) 81 MG tablet Take 1 tablet (81 mg) by mouth daily 30 tablet 3     atorvastatin (LIPITOR) 20 MG tablet Take 1 tablet (20 mg) by mouth daily 90 tablet 1     blood glucose (ACCU-CHEK COMPACT DRUM) test strip Use to test blood sugars 3 to 4 times daily. 400 strip 1     blood glucose (ACCU-CHEK  MULTICLIX) lancing device Lancing device to be used with lancets. 1 each 0     blood glucose monitoring (ACCU-CHEK COMPACT CARE KIT) meter device kit Use to test blood sugars 3 to 4  times daily. 1 kit 0     blood glucose monitoring (SOFTCLIX) lancets USE TO TEST BLOOD SUGAR 3-4 TIMES DAILY OR AS DIRECTED.  1     caffeine (NO-DOZE) 200 MG TABS tablet Take 400 mg by mouth daily       ciclopirox (PENLAC) 8 % external solution Apply to adjacent skin and affected nails daily.  Remove with alcohol every 7 days, then repeat. 6 mL 3     divalproex sodium delayed-release (DEPAKOTE) 500 MG DR tablet Take 2 tablets (1,000 mg) by mouth daily 10 tablet 0     furosemide (LASIX) 20 MG tablet Take 1 tablet (20 mg) by mouth daily 90 tablet 3     lisinopril (ZESTRIL) 20 MG tablet Take 1 tablet (20 mg) by mouth daily 30 tablet 5     lurasidone (LATUDA) 40 MG TABS tablet Take 40 mg by mouth At Bedtime       metFORMIN (GLUCOPHAGE-XR) 500 MG 24 hr tablet Take 1000mg in AM and 1000mg in  tablet 1     modafinil (PROVIGIL) 200 MG tablet daily        Multiple Vitamins-Minerals (SENIOR MULTIVITAMIN PLUS PO) Take 1 tablet by mouth daily       NONFORMULARY by Intrathecal route continuous - + up to 4 boluses daily    Managed by Dr Pawan Shaw, [facility]     Medications in Pump:  fentanyl 2000mcg/mL  Bupivacaine 20mg/mL  Morphine 5.8mg/mL     Rate:   Fentanyl 900mcg/day  Bupivacaine 9mg/day  Morphine 2.6mg/day  Pump Last Fill Date:  4/16/2020       omega-3 acid ethyl esters (LOVAZA) 1 g capsule Take 1 capsule (1 g) by mouth daily 90 capsule 3     pregabalin (LYRICA) 100 MG capsule Take 1 capsule (100 mg) by mouth 4 times daily (Patient taking differently: Take 100 mg by mouth 4 times daily Patient takes three times daily.) 360 capsule 3     senna-docusate (SENOKOT-S/PERICOLACE) 8.6-50 MG tablet Take 2 tablets by mouth 2 times daily as needed for constipation Take 1 tablet in the morning and 2 tablets in the evening.        SUMAtriptan (IMITREX) 50 MG tablet Take 1 tablet (50 mg) by mouth at onset of headache for migraine May repeat in 2 hours. Max 4 tablets/24 hours. 8 tablet 1     tamsulosin (FLOMAX) 0.4 MG capsule Take 0.4 mg by mouth daily   3     Turmeric 500 MG CAPS Take 1 capsule by mouth daily       vitamin C (ASCORBIC ACID) 1000 MG TABS Take 1,000 mg by mouth daily       metoprolol succinate ER (TOPROL-XL) 50 MG 24 hr tablet TAKE 1 TABLET(50 MG) BY MOUTH DAILY 30 tablet 0     Recent Labs   Lab Test 06/10/20  1031 05/04/20  1135 04/22/20  0636  04/20/20  0645  04/16/20  0730  02/24/20  1219 01/09/20  1058  05/13/19  1153  08/28/18  1259  05/16/18  1239   A1C 5.5  --   --   --   --   --  5.5  --   --  7.4*   < >  --    < >  --   --  7.2*   LDL  --   --   --   --   --   --   --   --  107*  --   --  39  --   --   --  27   HDL  --   --   --   --   --   --   --   --  32*  --   --  26*  --   --   --  25*   TRIG  --   --   --   --   --   --   --   --  121  --   --  269*  --   --   --  286*   ALT 27 33 21  --   --    < > 55  --   --  36  --   --    < > 28  --   --    CR 1.17 1.08 0.98   < > 0.96   < > 1.20   < >  --  1.02   < > 1.26*   < > 0.90   < > 0.82   GFRESTIMATED 66 72 82   < > 84   < > 64   < >  --  78   < > 60*   < > 86   < > >90   GFRESTBLACK 76 84 >90   < > >90   < > 74   < >  --  90   < > 70   < > >90   < > >90   POTASSIUM 4.6 3.9 4.1   < > 3.9   < > 3.8   < >  --  4.1   < > 4.8   < > 3.7   < > 4.7   TSH  --   --   --   --  1.74  --   --   --   --   --   --   --   --  1.75  --  2.81    < > = values in this interval not displayed.      BP Readings from Last 3 Encounters:   06/29/20 (!) 158/94   06/15/20 (!) 134/90   06/10/20 121/75    Wt Readings from Last 3 Encounters:   06/29/20 115.7 kg (255 lb 1.6 oz)   06/15/20 115.2 kg (254 lb)   06/10/20 119.2 kg (262 lb 11.2 oz)                      Reviewed and updated as needed this visit by Provider  Tobacco  Allergies  Meds  Problems  Med Hx  Surg Hx  Fam Hx         Review  "of Systems   Constitutional, HEENT, cardiovascular, pulmonary, GI, , musculoskeletal, neuro, skin, endocrine and psych systems are negative, except as otherwise noted.      Objective    BP (!) 158/94 (BP Location: Right arm, Patient Position: Sitting, Cuff Size: Adult Large)   Pulse 84   Resp 14   Ht 1.702 m (5' 7\")   Wt 115.7 kg (255 lb 1.6 oz)   SpO2 99%   BMI 39.95 kg/m    Body mass index is 39.95 kg/m .  Physical Exam   GENERAL: healthy, alert and no distress  RESP: lungs clear to auscultation - no rales, rhonchi or wheezes  CV: regular rate and rhythm, normal S1 S2, no S3 or S4, no murmur, click or rub, no peripheral edema and peripheral pulses strong  ABDOMEN: soft, nontender, no hepatosplenomegaly, no masses and bowel sounds normal  MS: no gross musculoskeletal defects noted, no edema  PSYCH: mentation appears normal, affect normal/bright    Diagnostic Test Results:  Labs reviewed in Epic        Assessment & Plan     1. KWABENA (acute kidney injury) (H)  Rechecking labs today  As noted recently added furosemide to help with some lower extremity swelling that he is having.  This does seem to have helped would like him to try a higher dose of lisinopril 30 mg daily but will ensure that his kidneys are stable prior to doing that  - Magnesium    2. Hypertension goal BP (blood pressure) < 140/90  Discussed options of changing medications.  Given that his pulses in the 60s to low 70s I do not know if we can go much higher on his Toprol.  Discussed that the Lasix does seem to help with some of his blood pressure issues but has not fully reduced it.  I think higher dose of lisinopril would be helpful.  I recommended starting with 30 mg daily to see if this helps bring his pressures down.  He does have a blood pressure cuff at home.  In the past he is tried hydrochlorothiazide and this did not seem to help with lower extremity swelling  - Basic metabolic panel  (Ca, Cl, CO2, Creat, Gluc, K, Na, BUN)  - " "Magnesium    3. Left ear pain  Normal exam today referred to ear nose and throat for evaluation if this is recurrent  - OTOLARYNGOLOGY REFERRAL    4. Onychomycosis  He does have some skin and nail changes that we discussed and evaluated his last visit.  Reviewed that he could try some topical treatments to see if this helps his nails improved.  Although his culture did not grow out fungal elements I believe we likely did not get a good sample capturing fungus.  We will have him try either topical Penlac or topical Vicks VapoRub and encouraged him to have his toenails trimmed short with twinkle toes or similar clinic  - ciclopirox (PENLAC) 8 % external solution; Apply to adjacent skin and affected nails daily.  Remove with alcohol every 7 days, then repeat.  Dispense: 6 mL; Refill: 3    5. Bilateral leg edema  Significantly improved he is wearing compressions hose Lasix is helping    6. Chronic back pain, unspecified back location, unspecified back pain laterality  Certainly this could be affecting his blood pressure he has plans for injection on Wednesday     Total time was over 40 minutes in face to face consultation with >50% spent in counseling     BMI:   Estimated body mass index is 39.95 kg/m  as calculated from the following:    Height as of this encounter: 1.702 m (5' 7\").    Weight as of this encounter: 115.7 kg (255 lb 1.6 oz).   Weight management plan: Discussed healthy diet and exercise guidelines        Regular exercise  Would like to see how he is doing in 1 month and recheck blood pressure then    No follow-ups on file.    Bernie Nazario MD  [facility]        "

## 2020-06-30 LAB
ANION GAP SERPL CALCULATED.3IONS-SCNC: 5 MMOL/L (ref 3–14)
BUN SERPL-MCNC: 31 MG/DL (ref 7–30)
CALCIUM SERPL-MCNC: 8.5 MG/DL (ref 8.5–10.1)
CHLORIDE SERPL-SCNC: 110 MMOL/L (ref 94–109)
CO2 SERPL-SCNC: 25 MMOL/L (ref 20–32)
CREAT SERPL-MCNC: 1.08 MG/DL (ref 0.66–1.25)
GFR SERPL CREATININE-BSD FRML MDRD: 72 ML/MIN/{1.73_M2}
GLUCOSE SERPL-MCNC: 168 MG/DL (ref 70–99)
MAGNESIUM SERPL-MCNC: 2.2 MG/DL (ref 1.6–2.3)
POTASSIUM SERPL-SCNC: 4.1 MMOL/L (ref 3.4–5.3)
SODIUM SERPL-SCNC: 140 MMOL/L (ref 133–144)

## 2020-07-01 ENCOUNTER — TRANSFERRED RECORDS (OUTPATIENT)
Dept: HEALTH INFORMATION MANAGEMENT | Facility: CLINIC | Age: 65
End: 2020-07-01

## 2020-07-01 NOTE — RESULT ENCOUNTER NOTE
Hello,    Great news, your results were normal. Excellent!  The kidneys seem happy with the daily furosemide.  You can use this as needed based on the leg swelling or daily.    Magnesium was perfect - normal levels.    Bernie Nazario MD

## 2020-07-01 NOTE — RESULT ENCOUNTER NOTE
Hello,    Great news, your results were normal. You could still have a fungal infection    Lets see if the Vicks Vaporub and the nail trimming helps.    Bernie Nazario MD

## 2020-07-07 ENCOUNTER — TELEPHONE (OUTPATIENT)
Dept: PHARMACY | Facility: CLINIC | Age: 65
End: 2020-07-07

## 2020-07-07 NOTE — TELEPHONE ENCOUNTER
Called pt to f/u on 5/23 MTM visit and med changes. No answer, message left. .    Minda Smith PharmD, TOSHIA, BCACP  [facility] Pharmacist, [facility]

## 2020-07-08 LAB
BACTERIA SPEC CULT: NORMAL
SPECIMEN SOURCE: NORMAL

## 2020-07-09 ENCOUNTER — TELEPHONE (OUTPATIENT)
Dept: PHARMACY | Facility: CLINIC | Age: 65
End: 2020-07-09

## 2020-07-09 NOTE — TELEPHONE ENCOUNTER
Called pt to follow-up on previous MTM visit with Bernie.    Increase in Metformin is going well 130-150 fasting.   Increase in atorvastatin is also going fine.     Pt had the following questions:   Lasix - would it be a good idea to increase lasix to 30mg (has 20mg tablets).   When I wake up my legs are really skinny, but they get more swollen throughout the day. However, he reports that when he presses on legs the indentation doesn't stay. He reports that his weight has been stable on 245 pounds. He watches his sodium when he eats at home, but reports that he eats out often and knows he gets a fair amount of salt from doing this. He would like to increase lasix in hopes of discontinuing use of his MAITE hose.      We discussed that at this point with a stable weight, no pitting edema, increasing lasix does not make sense and would likely not eliminate the need for him to wear compression stockings as these are helpful for other indications (venous insufficiency).     Pt reports he has additional questions - but had a client phone call he needed to take. Encouraged him to schedule a follow-up appointment.     Minda Smith, GabinoD, TOSHIA, BCACP  [facility] Pharmacist, [facility]

## 2020-07-10 ENCOUNTER — TRANSFERRED RECORDS (OUTPATIENT)
Dept: HEALTH INFORMATION MANAGEMENT | Facility: CLINIC | Age: 65
End: 2020-07-10

## 2020-07-10 ENCOUNTER — VIRTUAL VISIT (OUTPATIENT)
Dept: FAMILY MEDICINE | Facility: OTHER | Age: 65
End: 2020-07-10

## 2020-07-10 ENCOUNTER — HOSPITAL ENCOUNTER (OUTPATIENT)
Dept: WOUND CARE | Facility: CLINIC | Age: 65
Discharge: HOME | End: 2020-07-10
Attending: PODIATRIST | Admitting: PODIATRIST
Payer: COMMERCIAL

## 2020-07-10 VITALS
HEART RATE: 84 BPM | RESPIRATION RATE: 18 BRPM | TEMPERATURE: 97 F | SYSTOLIC BLOOD PRESSURE: 138 MMHG | DIASTOLIC BLOOD PRESSURE: 80 MMHG

## 2020-07-10 DIAGNOSIS — L85.3 XEROSIS CUTIS: ICD-10-CM

## 2020-07-10 DIAGNOSIS — E11.69 TYPE 2 DIABETES MELLITUS WITH OTHER SPECIFIED COMPLICATION, UNSPECIFIED WHETHER LONG TERM INSULIN USE (H): ICD-10-CM

## 2020-07-10 DIAGNOSIS — I89.0 ACQUIRED LYMPHEDEMA: ICD-10-CM

## 2020-07-10 PROCEDURE — G0463 HOSPITAL OUTPT CLINIC VISIT: HCPCS

## 2020-07-10 PROCEDURE — 99212 OFFICE O/P EST SF 10 MIN: CPT | Performed by: PODIATRIST

## 2020-07-10 RX ORDER — CLOBETASOL PROPIONATE 0.5 MG/G
OINTMENT TOPICAL DAILY
Status: ON HOLD | COMMUNITY
End: 2020-10-20

## 2020-07-10 RX ORDER — ASPIRIN 81 MG/1
TABLET, COATED ORAL
Status: ON HOLD | COMMUNITY
Start: 2020-06-28 | End: 2020-08-17

## 2020-07-10 NOTE — PROGRESS NOTES
[facility] [place] Progress Note    Subject: Patient was seen at wound center.  Patient was last seen on 3/6/2020 and his wound was healed.  He states that the wound has not reopened.  He is just concerned that he does have intermittent pain to the area.  Usually happens with pressure.  Has not noted any drainage from the area but does have some dry skin and is wondering what can be done for that.  Also has an open area on the anterior left leg that he is seeing a dermatologist for.  Notes that he needs to get back surgery for his L4 and L5 soon due to spinal stenosis.    PMH:   Past Medical History:   Diagnosis Date     Acquired lymphedema 2/4/2019     Allergic state      Amyloidosis (H)     followed by hematology Dr. Romeo status post peripheral stem cell transplant     Anxiety 5/11/2016     Anxiety and depression 12/18/2013     Bipolar I disorder (H) 9/1/2015    Under care of psychiatrist [facility]- Dr Rahman doxepin 25 mg, 2 cap bedtime DULoxetine 20 mg once daily  OLANZapine 7.5 mg  1 tab bedtime      Diabetes mellitus (H)     type 2     EKG abnormality 4/20/2020     H/O bone marrow transplant (H)      Headache(784.0)      History of diverticulitis 1/27/2015    1/15-rxed with antibiotics      Hyperlipidemia LDL goal <100 10/31/2010     Hypertension 2007     Hypertension goal BP (blood pressure) < 140/90 10/11/2011     Marianne (H) 8/20/2015     Morbid obesity (H) 8/28/2018     Myalgia and myositis, unspecified      Narcotic dependence (H) 9/6/2016    None in about 6 months- 8/1/17     NSTEMI (non-ST elevated myocardial infarction) (H) 04/19/2020     Obsessive-compulsive disorders      Obstructive sleep apnea 7/16/2008     OCD (obsessive compulsive disorder)      Other acquired absence of organ 94     Other cirrhosis of liver (H) possibly from vences  4/15/2020     Other specified viral warts      Pain in the abdomen      Peripheral edema 5/20/2018    Noncardiac Continue with  furosemide (LASIX) 20 MG  tablet,  potassium       chloride SA (K-DUR/KLOR-CON M) 10 MEQ CR  Tab once daily  Basic metabolic panel     Polyneuropathy associated with underlying disease (H) 2/4/2019     Pure hypercholesterolemia      Renal failure 5/10/2019     Restless legs syndrome (RLS) 6/29/2017     Sleep apnea      Stasis dermatitis of both legs 12/31/2018     Type 2 diabetes mellitus with other specified complication (H) 7/10/2017       Social Hx:   Social History     Socioeconomic History     Marital status:      Spouse name: Not on file     Number of children: 3     Years of education: Not on file     Highest education level: Not on file   Occupational History     Employer: [employer]   Social Needs     Financial resource strain: Not very hard     Food insecurity     Worry: Never true     Inability: Never true     Transportation needs     Medical: No     Non-medical: No   Tobacco Use     Smoking status: Never Smoker     Smokeless tobacco: Never Used   Substance and Sexual Activity     Alcohol use: No     Alcohol/week: 0.0 standard drinks     Drug use: No     Sexual activity: Never   Lifestyle     Physical activity     Days per week: 1 day     Minutes per session: Not on file     Stress: Not on file   Relationships     Social connections     Talks on phone: Not on file     Gets together: Not on file     Attends Anabaptism service: Not on file     Active member of club or organization: Not on file     Attends meetings of clubs or organizations: Not on file     Relationship status: Not on file     Intimate partner violence     Fear of current or ex partner: Not on file     Emotionally abused: Not on file     Physically abused: Not on file     Forced sexual activity: Not on file   Other Topics Concern     Parent/sibling w/ CABG, MI or angioplasty before 65F 55M? No   Social History Narrative    Social Documentation:05/27/2010        Balanced Diet: NO    Calcium intake: 3-4 per day    Caffeine: 2 per day    Exercise:  type  of activity NO    Sunscreen: Yes    Seatbelts:  Yes    Self Breast Exam:  No - na    Self Testicular Exam: no    Physical/Emotional/Sexual Abuse: No     Do you feel safe in your environment? Yes        Cholesterol screen up to date: Yes    Eye Exam up to date: Yes    Dental Exam up to date: Yes    Pap smear up to date: Does Not Apply    Mammogram up to date: Does Not Apply    Dexa Scan up to date:NO    Colonoscopy up to date:2007    Immunizations up to date: YES    Glucose screen if over 40: Yes        Sofi Rosas CMA                   Surgical Hx:   Past Surgical History:   Procedure Laterality Date     BMT PROTOCOL      for systemic amyloidosis     BONE MARROW BIOPSY, BONE SPECIMEN, NEEDLE/TROCAR N/A 6/8/2016    Procedure: BIOPSY BONE MARROW;  Surgeon: Nathan Agrawal MD;  Location:  GI     BONE MARROW BIOPSY, BONE SPECIMEN, NEEDLE/TROCAR N/A 2/20/2019    Procedure: BIOPSY BONE MARROW;  Surgeon: Michael Raygoza MD;  Location:  GI     C NONSPECIFIC PROCEDURE  94    Cholecystectomy     C NONSPECIFIC PROCEDURE  2000    repair deviated septum     CHOLECYSTECTOMY  1995    lap qian     COLONOSCOPY  2012    hx polyps     ESOPHAGOSCOPY, GASTROSCOPY, DUODENOSCOPY (EGD), COMBINED N/A 5/29/2015    Procedure: COMBINED ESOPHAGOSCOPY, GASTROSCOPY, DUODENOSCOPY (EGD), BIOPSY SINGLE OR MULTIPLE;  Surgeon: John Jacob MD;  Location:  GI     HERNIA REPAIR, UMBILICAL  2006       Allergies:    Allergies   Allergen Reactions     Cephalexin Diarrhea     Liraglutide Other (See Comments)     Sulfa Drugs Swelling     Pt has taken  Taken sulfa drugs orally without trouble. He had problems with Sulfa eye drops. Eye swelled up     Victoza      Increased migraine frequency and severity       Medications:   Current Outpatient Medications   Medication     alpha-lipoic acid 100 MG capsule     aspirin (ASPIRIN LOW DOSE) 81 MG tablet     atorvastatin (LIPITOR) 20 MG tablet     blood glucose (ACCU-CHEK  COMPACT DRUM) test strip     blood glucose (ACCU-CHEK MULTICLIX) lancing device     blood glucose monitoring (ACCU-CHEK COMPACT CARE KIT) meter device kit     blood glucose monitoring (SOFTCLIX) lancets     caffeine (NO-DOZE) 200 MG TABS tablet     ciclopirox (PENLAC) 8 % external solution     divalproex sodium delayed-release (DEPAKOTE) 500 MG DR tablet     furosemide (LASIX) 20 MG tablet     lisinopril (ZESTRIL) 20 MG tablet     lurasidone (LATUDA) 40 MG TABS tablet     metFORMIN (GLUCOPHAGE-XR) 500 MG 24 hr tablet     metoprolol succinate ER (TOPROL-XL) 50 MG 24 hr tablet     modafinil (PROVIGIL) 200 MG tablet     Multiple Vitamins-Minerals (SENIOR MULTIVITAMIN PLUS PO)     NONFORMULARY     omega-3 acid ethyl esters (LOVAZA) 1 g capsule     pregabalin (LYRICA) 100 MG capsule     senna-docusate (SENOKOT-S/PERICOLACE) 8.6-50 MG tablet     SUMAtriptan (IMITREX) 50 MG tablet     tamsulosin (FLOMAX) 0.4 MG capsule     Turmeric 500 MG CAPS     vitamin C (ASCORBIC ACID) 1000 MG TABS     No current facility-administered medications for this encounter.        /80 (BP Location: Left arm)   Pulse 84   Temp 97  F (36.1  C) (Temporal)   Resp 18       General:  Patient is alert and orientated.  NAD     Dermatologic: Right heel wound is still healed with no open lesions.  There is minimal diffuse scaling of the skin.  Small red macule noted to the anterior proximal left leg.    Vascular: DP & PT pulses are intact & regular bilaterally.   edema to both lower extremities but no varicosities noted.  CFT and skin temperature is normal to both lower extremities.     Neurologic: Lower extremity sensation is diminished to feet.     Musculoskeletal: Patient is ambulatory without assistive device or brace.  No gross ankle deformity noted.  No foot or ankle joint effusion is noted.    Assessment:    Acquired lymphedema  Type 2 diabetes mellitus with other specified complication, unspecified whether long term insulin use  (H)  Xerosis cutis    Plan: At this time there is no wound to the right heel.  Discussed motioning his feet for the dry skin daily.  He is discharged from the wound clinic at this time and will call with further questions or concerns if new wound develops.      Hyacinth Orta DPM, Podiatry/Foot and Ankle Surgery        .

## 2020-07-11 ENCOUNTER — NURSE TRIAGE (OUTPATIENT)
Dept: NURSING | Facility: CLINIC | Age: 65
End: 2020-07-11

## 2020-07-11 NOTE — PROGRESS NOTES
"Date: 07/10/2020 23:35:25  Clinician: Elma Jacobsen  Clinician NPI: [id number]  Patient: Parmjit Hernández  Patient : 1955  Patient Address: [address] [place] [address], [place] [address]  Patient Phone: [phone]  Visit Protocol: URI  Patient Summary:  Parmjit is a 64 year old ( : 1955 ) male who initiated a Visit for COVID-19 (Coronavirus) evaluation and screening. When asked the question \"Please sign me up to receive news, health information and promotions. \", Parmjit responded \"No\".    Parmjit states his symptoms started today.   His symptoms consist of a sore throat and myalgia.   Symptom details   Sore throat: Parmjit reports having mild throat pain (1-3 on a 10 point pain scale), does not have exudate on his tonsils, and can swallow liquids. The lymph nodes in his neck are not enlarged. A rash has not appeared on the skin since the sore throat started.    Parmjit denies having wheezing, nausea, teeth pain, ageusia, diarrhea, vomiting, rhinitis, malaise, ear pain, headache, chills, enlarged lymph nodes, anosmia, facial pain or pressure, fever, cough, and nasal congestion. He also denies having recent facial or sinus surgery in the past 60 days and taking antibiotic medication in the past month. He is not experiencing dyspnea.   Precipitating events  Parmjit is not sure if he has been exposed to someone with strep throat. He has not recently been exposed to someone with influenza. Parmjit has not been in close contact with any high risk individuals.   Pertinent COVID-19 (Coronavirus) information  In the past 14 days, Parmjit has not worked in a congregate living setting.   He does not work or volunteer as healthcare worker or a  and does not work or volunteer in a healthcare facility.   Parmjit also has not lived in a congregate living setting in the past 14 days. He does not live with a healthcare worker.   Parmjit has not had a close contact with a laboratory-confirmed COVID-19 " patient within 14 days of symptom onset.   Pertinent medical history  Parmjit does not need a return to work/school note.   Weight: 245 lbs   Parmjit does not smoke or use smokeless tobacco.   Weight: 245 lbs    MEDICATIONS: lisinopril oral, glipizide oral, atorvastatin oral, Flomax oral, metformin oral, Lyrica oral, amiloride-hydrochlorothiazide oral, modafinil oral, Depakote ER oral, Latuda oral, ALLERGIES: NKDA  Clinician Response:  Dear Parmjit,   Your symptoms show that you may have coronavirus (COVID-19). This illness can cause fever, cough and trouble breathing. Many people get a mild case and get better on their own. Some people can get very sick.  What should I do?  We would like to test you for this virus.   1. Please call [phone] to schedule your visit. Explain that you were referred by [facility] to have a COVID-19 test. Be ready to [facility].  The following will serve as your written order for this COVID Test, ordered by me, for the indication of suspected COVID [Z20.828]: The test will be ordered in [employer], our electronic health record, after you are scheduled. It will show as ordered and authorized by Ambrose Olivera MD.  Order: COVID-19 (Coronavirus) PCR for SYMPTOMATIC testing from [facility].      2. When it's time for your COVID test:  Stay at least 6 feet away from others. (If someone will drive you to your test, stay in the backseat, as far away from the  as you can.)   Cover your mouth and nose with a mask, tissue or washcloth.  Go straight to the testing site. Don't make any stops on the way there or back.      3.Starting now: Stay home and away from others (self-isolate) until:   You've had no fever---and no medicine that reduces fever---for 3 full days (72 hours). And...   Your other symptoms have gotten better. For example, your cough or breathing has improved. And...   At least 10 days have passed since your symptoms started.       During this time, don't leave the house  "except for testing or medical care.   Stay in your own room, even for meals. Use your own bathroom if you can.   Stay away from others in your home. No hugging, kissing or shaking hands. No visitors.  Don't go to work, school or anywhere else.    Clean \"high touch\" surfaces often (doorknobs, counters, handles, etc.). Use a household cleaning spray or wipes. You'll find a full list of  on the EPA website: www.epa.gov/pesticide-registration/list-n-disinfectants-use-against-sars-cov-2.   Cover your mouth and nose with a mask, tissue or washcloth to avoid spreading germs.  Wash your hands and face often. Use soap and water.  Caregivers in these groups are at risk for severe illness due to COVID-19:  o People 65 years and older  o People who live in a nursing home or long-term care facility  o People with chronic disease (lung, heart, cancer, diabetes, kidney, liver, immunologic)  o People who have a weakened immune system, including those who:   Are in cancer treatment  Take medicine that weakens the immune system, such as corticosteroids  Had a bone marrow or organ transplant  Have an immune deficiency  Have poorly controlled HIV or AIDS  Are obese (body mass index of 40 or higher)  Smoke regularly   o Caregivers should wear gloves while washing dishes, handling laundry and cleaning bedrooms and bathrooms.  o Use caution when washing and drying laundry: Don't shake dirty laundry, and use the warmest water setting that you can.  o For more tips, go to www.cdc.gov/coronavirus/2019-ncov/downloads/10Things.pdf.    4.Sign up for [employer]. We know it's scary to hear that you might have COVID-19. We want to track your symptoms to make sure you're okay over the next 2 weeks. Please look for an email from [employer]---this is a free, online program that we'll use to keep in touch. To sign up, follow the link in the email. Learn more at [email or web address]  How can I take care of myself?   Get lots of " rest. Drink extra fluids (unless a doctor has told you not to).   Take Tylenol (acetaminophen) for fever or pain. If you have liver or kidney problems, ask your family doctor if it's okay to take Tylenol.   Adults can take either:    650 mg (two 325 mg pills) every 4 to 6 hours, or...   1,000 mg (two 500 mg pills) every 8 hours as needed.    Note: Don't take more than 3,000 mg in one day. Acetaminophen is found in many medicines (both prescribed and over-the-counter medicines). Read all labels to be sure you don't take too much.   For children, check the Tylenol bottle for the right dose. The dose is based on the child's age or weight.    If you have other health problems (like cancer, heart failure, an organ transplant or severe kidney disease): Call your specialty clinic if you don't feel better in the next 2 days.       Know when to call 911. Emergency warning signs include:    Trouble breathing or shortness of breath Pain or pressure in the chest that doesn't go away Feeling confused like you haven't felt before, or not being able to wake up Bluish-colored lips or face.  Where can I get more information?   [facility] -- About COVID-19: [email or web address]."[email or web address]   CDC -- What to Do If You're Sick: www.cdc.gov/coronavirus/2019-ncov/about/steps-when-sick.html   CDC -- Ending Home Isolation: www.cdc.gov/coronavirus/2019-ncov/hcp/disposition-in-home-patients.html   CDC -- Caring for Someone: www.cdc.gov/coronavirus/2019-ncov/if-you-are-sick/care-for-someone.html   [facility] -- Interim Guidance for Hospital Discharge to Home: www.health.[facility].[place]/diseases/coronavirus/hcp/hospdischarge.pdf   [facility] clinical trials (COVID-19 research studies): clinicalaffairs.[facility]/umn-clinical-trials    Below are the COVID-19 hotlines at the [place] Department of Health ([facility]). Interpreters are available.    For health questions: Call 729-707-2951 or 1-631.624.3678 (7 a.m. to 7 p.m.) For questions about  schools and childcare: Call 949-567-1799 or 1-363.957.1134 (7 a.m. to 7 p.m.)    Diagnosis: Cough  Diagnosis ICD: R05

## 2020-07-11 NOTE — TELEPHONE ENCOUNTER
Patient completed [email or web address] evaluation - has a sore throat. Covid test was ordered and he has scheduled it for Sunday. He wants to know from writer's nursing perspective - is a sore throat a likely indicator of Covid. Advised patient that triager is unable to answer the question - Covid is too new and too many unknowns. Advised patient that there are patients who have tested positive for Covid who only have sore throats. Recommended patient proceed with testing.    COVID 19 Nurse Triage Plan/Patient Instructions    Please be aware that novel coronavirus (COVID-19) may be circulating in the community. If you develop symptoms such as fever, cough, or SOB or if you have concerns about the presence of another infection including coronavirus (COVID-19), please contact your health care provider or visit [email or web address].     Disposition/Instructions    Home care recommended. Follow home care protocol based instructions.    Thank you for taking steps to prevent the spread of this virus.  o Limit your contact with others.  o Wear a simple mask to cover your cough.  o Wash your hands well and often.    Resources    [facility]: About COVID-19: [email or web address]    CDC: What to Do If You're Sick: www.cdc.gov/coronavirus/2019-ncov/about/steps-when-sick.html    CDC: Ending Home Isolation: www.cdc.gov/coronavirus/2019-ncov/hcp/disposition-in-home-patients.html     CDC: Caring for Someone: www.cdc.gov/coronavirus/2019-ncov/if-you-are-sick/care-for-someone.html     [facility]: Interim Guidance for Hospital Discharge to Home: www.health.[facility].[place]/diseases/coronavirus/hcp/hospdischarge.pdf    [facility] clinical trials (COVID-19 research studies): clinicalaffairs.[facility].edu/um-clinical-trials     Below are the COVID-19 hotlines at the [place] Department of Health ([facility]). Interpreters are available.   o For health questions: Call 669-753-9714 or 1-517.306.1137 (7 a.m. to 7 p.m.)  o For questions about  schools and childcare: Call 472-114-9632 or 1-825.730.5578 (7 a.m. to 7 p.m.)     Michelle Antonio RN on 7/11/2020 at 12:09 AM    Additional Information    Negative: [1] Caller is not with the adult (patient) AND [2] reporting urgent symptoms    Negative: Lab result questions    Negative: Medication questions    Negative: Caller can't be reached by phone    Negative: Caller has already spoken to PCP or another triager    Negative: RN needs further essential information from caller in order to complete triage    Negative: Requesting regular office appointment    Negative: [1] Caller requesting NON-URGENT health information AND [2] PCP's office is the best resource    Health Information question, no triage required and triager able to answer question    Protocols used: INFORMATION ONLY CALL-A-

## 2020-07-12 DIAGNOSIS — Z20.822 SUSPECTED COVID-19 VIRUS INFECTION: Primary | ICD-10-CM

## 2020-07-12 PROCEDURE — U0003 INFECTIOUS AGENT DETECTION BY NUCLEIC ACID (DNA OR RNA); SEVERE ACUTE RESPIRATORY SYNDROME CORONAVIRUS 2 (SARS-COV-2) (CORONAVIRUS DISEASE [COVID-19]), AMPLIFIED PROBE TECHNIQUE, MAKING USE OF HIGH THROUGHPUT TECHNOLOGIES AS DESCRIBED BY CMS-2020-01-R: HCPCS | Performed by: FAMILY MEDICINE

## 2020-07-12 NOTE — LETTER
July 18, 2020        Parmjit Hernández  [address]  [facility] [address]    This letter provides a written record that you were tested for COVID-19 on 7/12/20.       Your result was negative. This means that we didn t find the virus that causes COVID-19 in your sample. A test may show negative when you do actually have the virus. This can happen when the virus is in the early stages of infection, before you feel illness symptoms.    If you have symptoms   Stay home and away from others (self-isolate) until you meet ALL of the guidelines below:    You ve had no fever--and no medicine that reduces fever--for 3 full days (72 hours). And      Your other symptoms have gotten better. For example, your cough or breathing has improved. And     At least 10 days have passed since your symptoms started.    During this time:    Stay home. Don t go to work, school or anywhere else.     Stay in your own room, including for meals. Use your own bathroom if you can.    Stay away from others in your home. No hugging, kissing or shaking hands. No visitors.    Clean  high touch  surfaces often (doorknobs, counters, handles, etc.). Use a household cleaning spray or wipes. You can find a full list on the EPA website at www.epa.gov/pesticide-registration/list-n-disinfectants-use-against-sars-cov-2.    Cover your mouth and nose with a mask, tissue or washcloth to avoid spreading germs.    Wash your hands and face often with soap and water.    Going back to work  Check with your employer for any guidelines to follow for going back to work.    Employers: This document serves as formal notice that your employee tested negative for COVID-19, as of the testing date shown above.

## 2020-07-13 LAB
SARS-COV-2 RNA SPEC QL NAA+PROBE: NOT DETECTED
SPECIMEN SOURCE: NORMAL

## 2020-07-13 NOTE — ADDENDUM NOTE
Encounter addended by: Alvina Torres RN on: 7/13/2020 3:16 PM   Actions taken: Charge Capture section accepted

## 2020-07-13 NOTE — PROGRESS NOTES
MTM ENCOUNTER  SUBJECTIVE/OBJECTIVE:                           Parmjit Hernández is a 64 year old male called for a follow-up visit. He was referred to me from Dr. Henry.  Today's visit is a follow-up MTM visit from 6/23 (see TE from 7/9 as well)     Patient consented to a telehealth visit: yes  Telemedicine Visit Details  Type of service:  Telephone visit  Start Time: 10:17 AM  End Time: 10:32 AM  Originating Location (pt. Location): Home  Distant Location (provider location):  [facility] [place]  Mode of Communication:  Telephone    Chief Complaint: Follow-up on changes made at 6/23 visit.    Allergies/ADRs: Reviewed in EHR  Tobacco:  reports that he has never smoked. He has never used smokeless tobacco.  Alcohol: rare use    Medication Adherence/Access: no issues reported    Type 2 Diabetes:  Pt currently taking metformin XR 2000 mg daily (increased from 1500mg on 6/23). Pt is not experiencing side effects.   SMBG: one time(s) daily. Ranges (patient reported): Averages 150-190. He believes they are higher because he's been cheating and eating ice cream at night.   Symptoms of low blood sugar? weak, dizzy,  Frequency of lows- last happened 2 weeks ago, went down to 67.   Symptoms of high blood sugar? none  Eye exam: up to date  Foot exam: up to date  Diet/Exercise: No recent diet changes. Walks ~10 minutes daily. Limited due to back pain (awaiting surgery)  Aspirin: Taking 81mg daily and denies side effects  Statin: Yes: atorvastatin   ACEi/ARB: Yes: lisinopril.     Hyperlipidemia: Current therapy includes atorvastatin 20 mg once daily (increased on 6/23) and Lovaza 1 g daily.  Pt reports no significant myalgias or other side effects.    Hypertension/Edema: Current medications include furosemide 20 mg daily, lisinopril 30 mg daily, and metoprolol succinate 50 mg daily.  Patient does self-monitor BP. Home BP recent readings: 150/90, 100/72 - Averaging 125/85 per his report. Continues to wear compression  stockings for edema.   Continues to take ibuprofen 800mg 1-2 times per day and acetaminophen 1000mg daily for back pain - has not been able to cut back on IBU to see if this helps edema/BP readings.     ASSESSMENT:                            Medication Adherence:  no issues identified    Type 2 Diabetes:  Tolerating metformin increase well. BG are increased from last visit, but he reports this is due to dietary changes. Encouraged him to reduce his ice cream intake.     Hyperlipidemia: Stable - tolerating increased atorvastatin dose without problem.     Hypertension/Edema: Per his report his average BP is at goal. Denies hypotension (previously hospitalized for this). Encouraged him to reduce ibuprofen as able (room to increase acetaminophen dose).     PLAN:                            1. No changes today. Pt to continue to work on reducing ibuprofen as able.     I spent 15 minutes with this patient today. All changes were made via collaborative practice agreement with Dr. Henry. A copy of the visit note was provided to the patient's primary care provider.    Will follow up in 1 month, sooner if needed.    The patient declined a summary of these recommendations.     Minda Smith, GabinoD, TOSHIA, BCACP  MTM Pharmacist, [facility]

## 2020-07-14 ENCOUNTER — VIRTUAL VISIT (OUTPATIENT)
Dept: PHARMACY | Facility: CLINIC | Age: 65
End: 2020-07-14

## 2020-07-14 ENCOUNTER — MYC MEDICAL ADVICE (OUTPATIENT)
Dept: PHARMACY | Facility: CLINIC | Age: 65
End: 2020-07-14

## 2020-07-14 DIAGNOSIS — E11.69 TYPE 2 DIABETES MELLITUS WITH OTHER SPECIFIED COMPLICATION, WITHOUT LONG-TERM CURRENT USE OF INSULIN (H): Primary | ICD-10-CM

## 2020-07-14 DIAGNOSIS — E78.5 HYPERLIPIDEMIA LDL GOAL <100: ICD-10-CM

## 2020-07-14 DIAGNOSIS — R60.9 EDEMA, UNSPECIFIED TYPE: ICD-10-CM

## 2020-07-14 DIAGNOSIS — I10 HYPERTENSION GOAL BP (BLOOD PRESSURE) < 140/90: ICD-10-CM

## 2020-07-14 PROCEDURE — 99606 MTMS BY PHARM EST 15 MIN: CPT | Mod: TEL | Performed by: PHARMACIST

## 2020-07-14 NOTE — PATIENT INSTRUCTIONS
Recommendations from today's MTM visit:                                                      Thanks for taking the time to talk today!  Keep us posted on when back surgery gets scheduled.  Once you are able to be up and moving around a little bit more I think that the swelling in your legs will get better.  Ibuprofen is probably also making the swelling in your legs worse as well as increasing your blood pressure, so as much as you can reduce the dose of ibuprofen, the better.  If needed for pain you can increase the amount of Tylenol (acetaminophen) that you take up to 3,000mg each day.     Also - happy birthday!     It was great to speak with you today.  I value your experience and would be very thankful for your time with providing feedback on our clinic survey. You may receive a survey via email or text message in the next few days.     Next MTM visit: Minda will check on you in a month.     To schedule another MTM appointment, please call the clinic directly or you may call the MTM scheduling line at [phone] or toll-free at [phone].     My Clinical Pharmacist's contact information:                                                      It was a pleasure talking with you today!  Please feel free to contact me with any questions or concerns you have.      Minda Smith, Pharm.D., M.B.A., BCACP  MTM Pharmacist, [facility]  Pager: [phone]  Email: [email or web address]

## 2020-07-14 NOTE — PROGRESS NOTES
Patient called back at 2:45pm - realized that some of the information that he gave me this morning may not have been correct in regards to his BP and Glucose readings. Believes that they are actually higher than what he discussed this morning.     Glucose:   Last week of fasting blood sugars: 151, 171, 165, 187, 190, 159. One reading from after a meal about a week ago was 222.   About a month ago they were more in the 120-130 range. So he feels that they have increased. He's added the one metformin but feels like blood sugars have crept up. He is reluctant to restart glipizide because of his previous history of hypoglycemia (see previous notes).     Blood pressure:   Weight has been steady at early 240s.   BP today 152/97 p 69  153/91, 106/73. 109/73, 159/84 - reports that his BP has been up and down like this. Takes BP late morning, usually before eating. Consistently taking lisinopril 30mg daily, metoprolol ER 50mg daily. Takes both medications in the late afternoon, and then takes his BP right after this. He hasn't correlated increased BP with pain.   Spacing out his BP meds may help to give him more even coverage. I'm hesitant to increase meds as he's been hospitalized for KWABENA and hypotension recently and some days his BP is quite low.     Back surgery will be on August 6th at [facility] but he is debating on not going through with it - not sure that he's physically or mentally prepared to do surgery.     Plan:   1. Will start Januvia 100mg once a day. Keep checking your blood sugar in the morning. If you remember to do it a second time during the day, 2 hours after eating or at bedtime are also helpful.     2. Keep taking your lisinopril in the midday, but move metoprolol to the evening. Keep checking BP a few times per week.     Will f/u in 2 weeks on Januvia start and BP readings.   Minda Smith, PharmD, TOSHIA, BCACP  MTM Pharmacist, [facility]

## 2020-07-15 NOTE — TELEPHONE ENCOUNTER
Called pt about this [employer] message - reviewed generic/less expensive options (Glipizide, Pioglitazone).  He does not want to re-try glipizide given his history of hypoglycemia and is apprehensive about trying pioglitazone due to edema.  After this discussion, he would like to try Januvia for a month and see how it goes.  Will f/u with pt as previously planned.     Minda Smith, GabinoD, TOSHIA, BCACP  MTM Pharmacist, [facility]

## 2020-07-16 ENCOUNTER — MYC MEDICAL ADVICE (OUTPATIENT)
Dept: FAMILY MEDICINE | Facility: CLINIC | Age: 65
End: 2020-07-16

## 2020-07-23 ENCOUNTER — PATIENT OUTREACH (OUTPATIENT)
Dept: CARE COORDINATION | Facility: CLINIC | Age: 65
End: 2020-07-23

## 2020-07-23 NOTE — PROGRESS NOTES
Clinic Care Coordination Contact  [facility]/Voicemail       Clinical Data: Care Coordinator Outreach  Outreach attempted x 1.  Left message on patient's voicemail with call back information and requested return call.  Plan:  Care Coordinator will try to reach patient again in 10 business days.

## 2020-07-27 ENCOUNTER — OFFICE VISIT (OUTPATIENT)
Dept: FAMILY MEDICINE | Facility: CLINIC | Age: 65
End: 2020-07-27
Payer: COMMERCIAL

## 2020-07-27 VITALS
HEIGHT: 67 IN | HEART RATE: 78 BPM | RESPIRATION RATE: 16 BRPM | WEIGHT: 253 LBS | SYSTOLIC BLOOD PRESSURE: 138 MMHG | OXYGEN SATURATION: 100 % | TEMPERATURE: 98.5 F | DIASTOLIC BLOOD PRESSURE: 78 MMHG | BODY MASS INDEX: 39.71 KG/M2

## 2020-07-27 DIAGNOSIS — E11.69 TYPE 2 DIABETES MELLITUS WITH OTHER SPECIFIED COMPLICATION, UNSPECIFIED WHETHER LONG TERM INSULIN USE (H): ICD-10-CM

## 2020-07-27 DIAGNOSIS — D69.6 THROMBOCYTOPENIA, UNSPECIFIED (H): ICD-10-CM

## 2020-07-27 DIAGNOSIS — G63 POLYNEUROPATHY ASSOCIATED WITH UNDERLYING DISEASE (H): ICD-10-CM

## 2020-07-27 DIAGNOSIS — F31.9 BIPOLAR I DISORDER (H): ICD-10-CM

## 2020-07-27 DIAGNOSIS — Z01.818 PREOP GENERAL PHYSICAL EXAM: Primary | ICD-10-CM

## 2020-07-27 DIAGNOSIS — G62.9 PERIPHERAL POLYNEUROPATHY: ICD-10-CM

## 2020-07-27 DIAGNOSIS — I83.009 VENOUS ULCER (H): ICD-10-CM

## 2020-07-27 DIAGNOSIS — G47.33 OBSTRUCTIVE SLEEP APNEA: ICD-10-CM

## 2020-07-27 DIAGNOSIS — I10 HYPERTENSION GOAL BP (BLOOD PRESSURE) < 140/90: ICD-10-CM

## 2020-07-27 DIAGNOSIS — L97.909 VENOUS ULCER (H): ICD-10-CM

## 2020-07-27 DIAGNOSIS — R33.9 URINARY RETENTION WITH INCOMPLETE BLADDER EMPTYING: ICD-10-CM

## 2020-07-27 LAB
ERYTHROCYTE [DISTWIDTH] IN BLOOD BY AUTOMATED COUNT: 13.3 % (ref 10–15)
HCT VFR BLD AUTO: 35.5 % (ref 40–53)
HGB BLD-MCNC: 11.8 G/DL (ref 13.3–17.7)
MCH RBC QN AUTO: 32.4 PG (ref 26.5–33)
MCHC RBC AUTO-ENTMCNC: 33.2 G/DL (ref 31.5–36.5)
MCV RBC AUTO: 98 FL (ref 78–100)
PLATELET # BLD AUTO: 103 10E9/L (ref 150–450)
RBC # BLD AUTO: 3.64 10E12/L (ref 4.4–5.9)
WBC # BLD AUTO: 6.7 10E9/L (ref 4–11)

## 2020-07-27 PROCEDURE — 99214 OFFICE O/P EST MOD 30 MIN: CPT | Performed by: FAMILY MEDICINE

## 2020-07-27 PROCEDURE — 85027 COMPLETE CBC AUTOMATED: CPT | Performed by: FAMILY MEDICINE

## 2020-07-27 PROCEDURE — 36415 COLL VENOUS BLD VENIPUNCTURE: CPT | Performed by: FAMILY MEDICINE

## 2020-07-27 PROCEDURE — 80053 COMPREHEN METABOLIC PANEL: CPT | Performed by: FAMILY MEDICINE

## 2020-07-27 RX ORDER — TAMSULOSIN HYDROCHLORIDE 0.4 MG/1
0.4 CAPSULE ORAL 2 TIMES DAILY
Qty: 30 CAPSULE | Refills: 3 | Status: SHIPPED | OUTPATIENT
Start: 2020-07-27 | End: 2023-07-03

## 2020-07-27 RX ORDER — LURASIDONE HYDROCHLORIDE 60 MG/1
60 TABLET, FILM COATED ORAL AT BEDTIME
Qty: 60 TABLET | Refills: 0 | Status: SHIPPED | OUTPATIENT
Start: 2020-07-27 | End: 2020-10-08

## 2020-07-27 RX ORDER — TAMSULOSIN HYDROCHLORIDE 0.4 MG/1
0.8 CAPSULE ORAL DAILY
Qty: 30 CAPSULE | Refills: 3 | Status: SHIPPED | DISCHARGE
Start: 2020-07-27 | End: 2020-07-27

## 2020-07-27 RX ORDER — PREGABALIN 100 MG/1
100 CAPSULE ORAL 3 TIMES DAILY
Qty: 360 CAPSULE | Refills: 3 | Status: ON HOLD | DISCHARGE
Start: 2020-07-27 | End: 2020-10-20

## 2020-07-27 ASSESSMENT — MIFFLIN-ST. JEOR: SCORE: 1896.23

## 2020-07-27 NOTE — PROGRESS NOTES
[facility]  [address]  [facility] [address]  [phone]  Dept: [phone]    PRE-OP EVALUATION:  Today's date: 2020    Parmjit Hernández (: 1955) presents for pre-operative evaluation assessment as requested by Dr. Resendez  He requires evaluation and anesthesia risk assessment prior to undergoing surgery/procedure for treatment of chronic pain.    Fax number for surgical facility: [phone]  Primary Physician: Vince Henry  Type of Anesthesia Anticipated: General    Proposed Surgery/ Procedure: Minimally invasive Spinal nerve decompression of L4-L5.   Date of Surgery/ Procedure: 2020  Time of Surgery/ Procedure: [facility]  Hospital/Surgical Facility: [facility].   Surgery Fax Number: [phone]  Primary Physician: Vince Henry  Type of Anesthesia Anticipated: [facility]    Preoperative Questionnaire:   No - Have you ever had a heart attack or stroke?  No - Have you ever had surgery on your heart or blood vessels, such as a stent, coronary (heart) bypass, or surgery on an artery in the head, neck, heart, or legs?  No - Do you have chest pain when you are physically active?  No - Do you have a history of heart failure?  No - Do you currently have a cold, bronchitis, or symptoms of other respiratory (head and chest) infections?  No - Do you have a cough, shortness of breath, or wheezing?  No - Do you or anyone in your family have a history of blood clots?  No - Do you or anyone in your family have a serious bleeding problem, such as long-lasting bleeding after surgeries or cuts?  No - Have you ever had anemia or been told to take iron pills?  No - Have you had any abnormal blood loss such as black, tarry or bloody stools, or abnormal vaginal bleeding?  No - Have you ever had a blood transfusion?  Yes - Are you willing to have a blood transfusion if it is medically needed before, during, or after your surgery?  No - Have you or anyone in your family  ever had problems with anesthesia (sedation for surgery)?  Yes - Do you have sleep apnea, excessive snoring, or daytime drowsiness?   No - Do you have any artifical heart valves or other implanted medical devices, such as a pacemaker, defibrillator, or continuous glucose monitor?  No - Do you have any artifical joints?  No - Are you allergic to latex?  No - Is there any chance that you may be pregnant?    Patient has a Health Care Directive or Living Will:  YES      HPI:     HPI related to upcoming procedure:   64yo male with chronic multisite pain- Scheduled to have minimally invasive nerve decompression on L4, L5.  IT pain pump next week- Under care of Dr Shaw. He currently has an intrathecal pump. Route: by Intrathecal route continuous - plus up to 4 boluses daily.     [facility].   Medications in Pump:   fentanyl 2000mcg/mL   Bupivacaine 20mg/mL   Morphine 5.8mg/mL     Rate:   Fentanyl 900mcg/day   Bupivacaine 9mg/day   Morphine 2.6mg/day   Pump Last Fill Date:  4/16/2020 - Intrathecal      He also has spondylosis w/o myelopathy or radiculopathy, lumbar region. Neuropathic heredofamilial amyloidosis   DIABETES - Patient has a longstanding history of DiabetesType Type II . Patient is being treated with oral agents and denies significant side effects. Control has been good. Complicating factors include but are not limited to: hyperlipidemia.                                                                                                                            .  HYPERLIPIDEMIA - Patient has a long history of significant Hyperlipidemia requiring medication for treatment with recent good control. Patient reports no problems or side effects with the medication.                                           Blood pressure and blood glucose are well controlled. .     Open sore in the left lower extremity. Healing well.     SLEEP PROBLEM - Patient has a longstanding history of snoring.. Patient has tried OTC  medications with limited success.       MEDICAL HISTORY:     Patient Active Problem List    Diagnosis Date Noted     Venous ulcer (H) 08/03/2020     Priority: Medium     Thrombocytopenia, unspecified (H) 08/03/2020     Priority: Medium     Bilateral leg edema 06/29/2020     Priority: Medium     Onychomycosis 06/29/2020     Priority: Medium     EKG abnormality 04/20/2020     Priority: Medium     KWABENA (acute kidney injury) (H) 04/18/2020     Priority: Medium     Acute kidney failure, unspecified (H) 04/18/2020     Priority: Medium     Other cirrhosis of liver (H) possibly from vences  04/15/2020     Priority: Medium     Diarrhea of presumed infectious origin 04/15/2020     Priority: Medium     Diarrhea 04/15/2020     Priority: Medium     Low blood pressure reading 05/16/2019     Priority: Medium     Renal failure 05/10/2019     Priority: Medium     Polyneuropathy associated with underlying disease (H) 02/04/2019     Priority: Medium     Acquired lymphedema 02/04/2019     Priority: Medium     Stasis dermatitis of both legs 12/31/2018     Priority: Medium     Morbid obesity (H) 08/28/2018     Priority: Medium     Peripheral edema 05/20/2018     Priority: Medium     Noncardiac  Continue with  furosemide (LASIX) 20 MG tablet,   potassium       chloride SA (K-DUR/KLOR-CON M) 10 MEQ CR  Tab once daily   Basic metabolic panel       Type 2 diabetes mellitus with other specified complication (H) 07/10/2017     Priority: Medium     Obstructive sleep apnea syndrome 06/29/2017     Priority: Medium     sleep study       Restless legs syndrome (RLS) 06/29/2017     Priority: Medium     History of peripheral stem cell transplant (H) 12/16/2016     Priority: Medium     Narcotic dependence (H) 09/06/2016     Priority: Medium     None in about 6 months- 8/1/17       Insomnia due to medical condition 08/23/2016     Priority: Medium     Other amyloidosis (H) 06/01/2016     Priority: Medium      #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 7/3/2018      Dr [id number]       Anxiety 05/11/2016     Priority: Medium     Chronic abdominal pain 03/15/2016     Priority: Medium     Bipolar I disorder (H) 09/01/2015     Priority: Medium     Under care of psychiatrist ANEL Rahman  doxepin 25 mg, 2 cap bedtime  DULoxetine 20 mg once daily   OLANZapine 7.5 mg  1 tab bedtime        Marianne (H) 08/20/2015     Priority: Medium     MENTAL HEALTH 08/17/2015     Priority: Medium     History of diverticulitis 01/27/2015     Priority: Medium     1/15-rxed with antibiotics       Anxiety and depression 12/18/2013     Priority: Medium     Advanced directives, counseling/discussion 05/29/2012     Priority: Medium     Discussed advance care planning with patient; information given to patient to review. 5/29/2012     Honoring choices letter mailed. 7-  Lynsey Bustamante, RT (R)         Migraine 05/29/2012     Priority: Medium     Hypertension goal BP (blood pressure) < 140/90 10/11/2011     Priority: Medium     Hyperlipidemia LDL goal <100 10/31/2010     Priority: Medium     Type 2 diabetes mellitus without complication, without long-term current use of insulin (H) 05/22/2009     Priority: Medium     Obstructive sleep apnea 07/16/2008     Priority: Medium      Past Medical History:   Diagnosis Date     Acquired lymphedema 2/4/2019     Allergic state      Amyloidosis (H)     followed by hematology Dr. Romeo status post peripheral stem cell transplant     Anxiety 5/11/2016     Anxiety and depression 12/18/2013     Bipolar I disorder (H) 9/1/2015    Under care of psychiatrist ANEL Rahman doxepin 25 mg, 2 cap bedtime DULoxetine 20 mg once daily  OLANZapine 7.5 mg  1 tab bedtime      Diabetes mellitus (H)     type 2     EKG abnormality 4/20/2020     H/O bone marrow transplant (H)      Headache(784.0)      History of diverticulitis 1/27/2015    1/15-rxed with antibiotics      Hyperlipidemia LDL goal  <100 10/31/2010     Hypertension 2007     Hypertension goal BP (blood pressure) < 140/90 10/11/2011     Marianne (H) 8/20/2015     Morbid obesity (H) 8/28/2018     Myalgia and myositis, unspecified      Narcotic dependence (H) 9/6/2016    None in about 6 months- 8/1/17     NSTEMI (non-ST elevated myocardial infarction) (H) 04/19/2020     Obsessive-compulsive disorders      Obstructive sleep apnea 7/16/2008     OCD (obsessive compulsive disorder)      Other acquired absence of organ 94     Other cirrhosis of liver (H) possibly from vences  4/15/2020     Other specified viral warts      Pain in the abdomen      Peripheral edema 5/20/2018    Noncardiac Continue with  furosemide (LASIX) 20 MG tablet,  potassium       chloride SA (K-DUR/KLOR-CON M) 10 MEQ CR  Tab once daily  Basic metabolic panel     Polyneuropathy associated with underlying disease (H) 2/4/2019     Pure hypercholesterolemia      Renal failure 5/10/2019     Restless legs syndrome (RLS) 6/29/2017     Sleep apnea      Stasis dermatitis of both legs 12/31/2018     Type 2 diabetes mellitus with other specified complication (H) 7/10/2017     Past Surgical History:   Procedure Laterality Date     BMT PROTOCOL      for systemic amyloidosis     BONE MARROW BIOPSY, BONE SPECIMEN, NEEDLE/TROCAR N/A 6/8/2016    Procedure: BIOPSY BONE MARROW;  Surgeon: Nathan Agrawal MD;  Location:  GI     BONE MARROW BIOPSY, BONE SPECIMEN, NEEDLE/TROCAR N/A 2/20/2019    Procedure: BIOPSY BONE MARROW;  Surgeon: Michael Raygoza MD;  Location:  GI     C NONSPECIFIC PROCEDURE  94    Cholecystectomy     C NONSPECIFIC PROCEDURE  2000    repair deviated septum     CHOLECYSTECTOMY  1995    lap qian     COLONOSCOPY  2012    hx polyps     ESOPHAGOSCOPY, GASTROSCOPY, DUODENOSCOPY (EGD), COMBINED N/A 5/29/2015    Procedure: COMBINED ESOPHAGOSCOPY, GASTROSCOPY, DUODENOSCOPY (EGD), BIOPSY SINGLE OR MULTIPLE;  Surgeon: John Jacob MD;  Location:  GI     HERNIA  REPAIR, UMBILICAL  2006     Current Outpatient Medications   Medication Sig Dispense Refill     alpha-lipoic acid 100 MG capsule Take 200 mg by mouth daily       aspirin (ASPIRIN LOW DOSE) 81 MG tablet Take 1 tablet (81 mg) by mouth daily 30 tablet 3     ASPIRIN LOW DOSE 81 MG EC tablet TK 1 T PO QD       atorvastatin (LIPITOR) 20 MG tablet Take 1 tablet (20 mg) by mouth daily 90 tablet 1     blood glucose (ACCU-CHEK COMPACT DRUM) test strip Use to test blood sugars 3 to 4 times daily. 400 strip 1     blood glucose monitoring (ACCU-CHEK COMPACT CARE KIT) meter device kit Use to test blood sugars 3 to 4  times daily. 1 kit 0     blood glucose monitoring (SOFTCLIX) lancets USE TO TEST BLOOD SUGAR 3-4 TIMES DAILY OR AS DIRECTED.  1     caffeine (NO-DOZE) 200 MG TABS tablet Take 400 mg by mouth daily       clobetasol (TEMOVATE) 0.05 % external ointment Apply topically 2 times daily       divalproex sodium delayed-release (DEPAKOTE) 500 MG DR tablet Take 2 tablets (1,000 mg) by mouth daily 10 tablet 0     furosemide (LASIX) 20 MG tablet Take 1 tablet (20 mg) by mouth daily 90 tablet 3     lisinopril (ZESTRIL) 20 MG tablet Take 1.5 tablets (30 mg) by mouth daily 135 tablet 3     lurasidone (LATUDA) 60 MG TABS tablet Take 1 tablet (60 mg) by mouth At Bedtime 60 tablet 0     metFORMIN (GLUCOPHAGE-XR) 500 MG 24 hr tablet Take 1000mg in AM and 1000mg in  tablet 1     modafinil (PROVIGIL) 200 MG tablet daily        Multiple Vitamins-Minerals (SENIOR MULTIVITAMIN PLUS PO) Take 1 tablet by mouth daily       NONFORMULARY by Intrathecal route continuous - + up to 4 boluses daily    Managed by [facility]     Medications in Pump:  fentanyl 2000mcg/mL  Bupivacaine 20mg/mL  Morphine 5.8mg/mL     Rate:   Fentanyl 900mcg/day  Bupivacaine 9mg/day  Morphine 2.6mg/day  Pump Last Fill Date:  4/16/2020       omega-3 acid ethyl esters (LOVAZA) 1 g capsule Take 1 capsule (1 g) by mouth daily 90 capsule 3      "pregabalin (LYRICA) 100 MG capsule Take 1 capsule (100 mg) by mouth 3 times daily 360 capsule 3     senna-docusate (SENOKOT-S/PERICOLACE) 8.6-50 MG tablet Take 2 tablets by mouth 2 times daily as needed for constipation Take 1 tablet in the morning and 2 tablets in the evening.       sitagliptin (JANUVIA) 100 MG tablet Take 1 tablet (100 mg) by mouth daily 90 tablet 0     SUMAtriptan (IMITREX) 50 MG tablet Take 1 tablet (50 mg) by mouth at onset of headache for migraine May repeat in 2 hours. Max 4 tablets/24 hours. 8 tablet 1     tamsulosin (FLOMAX) 0.4 MG capsule Take 1 capsule (0.4 mg) by mouth 2 times daily 30 capsule 3     Turmeric 500 MG CAPS Take 1 capsule by mouth daily       vitamin C (ASCORBIC ACID) 1000 MG TABS Take 1,000 mg by mouth daily       glimepiride (AMARYL) 1 MG tablet Take 1 tablet (1 mg) by mouth every morning (before breakfast) 90 tablet 0     metoprolol succinate ER (TOPROL-XL) 50 MG 24 hr tablet TAKE 1 TABLET(50 MG) BY MOUTH DAILY 90 tablet 1     OTC products: None, except as noted above    Allergies   Allergen Reactions     Cephalexin Diarrhea     Liraglutide Other (See Comments)     Sulfa Drugs Swelling     Pt has taken  Taken sulfa drugs orally without trouble. He had problems with Sulfa eye drops. Eye swelled up     Victoza      Increased migraine frequency and severity      Latex Allergy: NO    Social History     Tobacco Use     Smoking status: Never Smoker     Smokeless tobacco: Never Used   Substance Use Topics     Alcohol use: No     Alcohol/week: 0.0 standard drinks     History   Drug Use No       REVIEW OF SYSTEMS:   Constitutional, neuro, ENT, endocrine, pulmonary, cardiac, gastrointestinal, genitourinary, musculoskeletal, integument and psychiatric systems are negative, except as otherwise noted.    EXAM:   /78   Pulse 78   Temp 98.5  F (36.9  C) (Oral)   Resp 16   Ht 1.702 m (5' 7\")   Wt 114.8 kg (253 lb)   SpO2 100%   BMI 39.63 kg/m      GENERAL APPEARANCE: " healthy, alert and no distress     EYES: EOMI,  PERRL     HENT: ear canals and TM's normal and nose and mouth without ulcers or lesions     NECK: no adenopathy, no asymmetry, masses, or scars and thyroid normal to palpation     RESP: lungs clear to auscultation - no rales, rhonchi or wheezes     CV: regular rates and rhythm, normal S1 S2, no S3 or S4 and no murmur, click or rub     ABDOMEN:  soft, nontender, no HSM or masses and bowel sounds normal     MS: extremities normal- no gross deformities noted, no evidence of inflammation in joints, FROM in all extremities.     SKIN: no suspicious lesions or rashes     NEURO: Normal strength and tone, sensory exam grossly normal, mentation intact and speech normal     PSYCH: mentation appears normal. and affect normal/bright     LYMPHATICS: No cervical adenopathy    DIAGNOSTICS:   No labs or EKG required for low risk surgery (cataract, skin procedure, breast biopsy, etc)    Recent Labs   Lab Test 06/29/20  1215 06/10/20  1031 05/04/20  1135  04/17/20 2009 04/16/20  0730  01/27/18  1556   HGB  --  11.7* 11.1*   < > 11.5* 12.8*   < > 12.6*   PLT  --  111* 157   < > 164 143*   < > 132*   INR  --   --   --   --  1.18*  --   --  1.06    138 135   < > 135 136   < > 126*   POTASSIUM 4.1 4.6 3.9   < > 4.1 3.8   < > 4.0   CR 1.08 1.17 1.08   < > 2.09* 1.20   < > 0.96   A1C  --  5.5  --   --   --  5.5   < >  --     < > = values in this interval not displayed.        IMPRESSION:   Reason for surgery/procedure: Minimally invasive Spinal nerve decompression of L4-L5.   Diagnosis/reason for consult: Preoperative history and physical exam prior to minimally invasive surgery.    The proposed surgical procedure is considered LOW risk.    REVISED CARDIAC RISK INDEX  The patient has the following serious cardiovascular risks for perioperative complications such as (MI, PE, VFib and 3  AV Block):  No serious cardiac risks  INTERPRETATION: 0 risks: Class I (very low risk - 0.4%  complication rate)    The patient has the following additional risks for perioperative complications:  No identified additional risks    1. Preop general physical exam  - CBC with platelets  - **Comprehensive metabolic panel FUTURE anytime    2. Peripheral polyneuropathy  Assessment: chronic polyneuropathy. The patient has an intrathecal continuous pump.   Plan:  - pregabalin (LYRICA) 100 MG capsule; Take 1 capsule (100 mg) by mouth 3 times daily  Dispense: 360 capsule; Refill: 3    3. Urinary retention with incomplete bladder emptying  - tamsulosin (FLOMAX) 0.4 MG capsule; Take 1 capsule (0.4 mg) by mouth 2 times daily  Dispense: 30 capsule; Refill: 3    4. Polyneuropathy associated with underlying disease (H)  - pregabalin (LYRICA) 100 MG capsule; Take 1 capsule (100 mg) by mouth 3 times daily  Dispense: 360 capsule; Refill: 3    5. Venous ulcer (H)  Chronic stable ulcer on the left lower extremity. Routine follow-up with wound care clinic.     6. Thrombocytopenia, unspecified (H)  Assessment: The patient has a long history of anemia and thrombocytopenia.  His platelet level is stable at 103.  Hemoglobin is also stable at 11.8.  Plan:  - CBC with platelets    7. Obstructive sleep apnea  Assessment: The patient uses a CPAP at home.  Plan:  Continue to use CPAP post procedure.  8. Type 2 diabetes mellitus with other specified complication, unspecified whether long term insulin use (H)  Most recent hemoglobin A1c was 5.5%.  - **Comprehensive metabolic panel FUTURE anytime    9. Hypertension goal BP (blood pressure) < 140/90  Well-controlled with current medications.  - **Comprehensive metabolic panel FUTURE anytime    10. Bipolar I disorder (H)  - lurasidone (LATUDA) 60 MG TABS tablet; Take 1 tablet (60 mg) by mouth At Bedtime  Dispense: 60 tablet; Refill: 0    RECOMMENDATIONS:       Cardiovascular Risk  Performs 4 METs exercise without symptoms (Light housework (dusting, washing dishes), Climb a flight of stairs and  Walk on level ground at 15 minutes per mile (4 miles/hour)) .   -The patient had extensive cardiac work-up within the last 6 months.  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  He underwent an echocardiogram in April 19, 2020 which mild dilatation of the aorta at 3.9 cm.  The patient had a virtual visit with cardiology on Amaya 15, 2020.  There was no urgent need in cardiac MRI or Lexiscan stress test at that time.    Pulmonary Risk  Incentive spirometry post op      Obstructive Sleep Apnea (or suspected sleep apnea)  Patient is clearly advised to use their home CPAP when released from surgery      Anemia  Anemia and does not require treatment prior to surgery.  Monitor Hemoglobin postoperatively.      --Patient is to take all scheduled medications on the day of surgery.    APPROVAL GIVEN to proceed with proposed procedure, without further diagnostic evaluation       Signed Electronically by: Vince Henry MD    Copy of this evaluation report is provided to requesting physician.    [place] Preop Guidelines    Revised Cardiac Risk Index

## 2020-07-28 ENCOUNTER — ALLIED HEALTH/NURSE VISIT (OUTPATIENT)
Dept: PHARMACY | Facility: CLINIC | Age: 65
End: 2020-07-28

## 2020-07-28 ENCOUNTER — MYC MEDICAL ADVICE (OUTPATIENT)
Dept: FAMILY MEDICINE | Facility: CLINIC | Age: 65
End: 2020-07-28

## 2020-07-28 DIAGNOSIS — R12 HEARTBURN: ICD-10-CM

## 2020-07-28 DIAGNOSIS — R60.9 EDEMA, UNSPECIFIED TYPE: ICD-10-CM

## 2020-07-28 DIAGNOSIS — I10 HYPERTENSION GOAL BP (BLOOD PRESSURE) < 140/90: ICD-10-CM

## 2020-07-28 DIAGNOSIS — E11.69 TYPE 2 DIABETES MELLITUS WITH OTHER SPECIFIED COMPLICATION, WITHOUT LONG-TERM CURRENT USE OF INSULIN (H): Primary | ICD-10-CM

## 2020-07-28 LAB
ALBUMIN SERPL-MCNC: 3.6 G/DL (ref 3.4–5)
ALP SERPL-CCNC: 76 U/L (ref 40–150)
ALT SERPL W P-5'-P-CCNC: 26 U/L (ref 0–70)
ANION GAP SERPL CALCULATED.3IONS-SCNC: 9 MMOL/L (ref 3–14)
AST SERPL W P-5'-P-CCNC: 16 U/L (ref 0–45)
BILIRUB SERPL-MCNC: 0.4 MG/DL (ref 0.2–1.3)
BUN SERPL-MCNC: 24 MG/DL (ref 7–30)
CALCIUM SERPL-MCNC: 9.2 MG/DL (ref 8.5–10.1)
CHLORIDE SERPL-SCNC: 107 MMOL/L (ref 94–109)
CO2 SERPL-SCNC: 24 MMOL/L (ref 20–32)
CREAT SERPL-MCNC: 1.05 MG/DL (ref 0.66–1.25)
GFR SERPL CREATININE-BSD FRML MDRD: 75 ML/MIN/{1.73_M2}
GLUCOSE SERPL-MCNC: 156 MG/DL (ref 70–99)
POTASSIUM SERPL-SCNC: 4.5 MMOL/L (ref 3.4–5.3)
PROT SERPL-MCNC: 6.8 G/DL (ref 6.8–8.8)
SODIUM SERPL-SCNC: 140 MMOL/L (ref 133–144)

## 2020-07-28 PROCEDURE — 99606 MTMS BY PHARM EST 15 MIN: CPT | Performed by: PHARMACIST

## 2020-07-28 RX ORDER — METOPROLOL SUCCINATE 50 MG/1
TABLET, EXTENDED RELEASE ORAL
Qty: 90 TABLET | Refills: 1 | Status: SHIPPED | OUTPATIENT
Start: 2020-07-28 | End: 2020-11-19

## 2020-07-28 RX ORDER — GLIMEPIRIDE 1 MG/1
1 TABLET ORAL
Qty: 30 TABLET | Refills: 1 | Status: SHIPPED | OUTPATIENT
Start: 2020-07-28 | End: 2020-07-29

## 2020-07-28 NOTE — PATIENT INSTRUCTIONS
Recommendations from today's MTM visit:                                                      1. Start 1/2 tablet of glimepiride daily. Monitor for low blood sugars.  If possible, try to test your blood sugar fasting (in the morning before food) and 2 hours after a meal.     It was great to speak with you today.  I value your experience and would be very thankful for your time with providing feedback on our clinic survey. You may receive a survey via email or text message in the next few days.     Next MTM visit: Minda will check in with you on Thursday.     To schedule another MTM appointment, please call the clinic directly or you may call the MTM scheduling line at [phone] or toll-free at [phone].     My Clinical Pharmacist's contact information:                                                      It was a pleasure talking with you today!  Please feel free to contact me with any questions or concerns you have.      Minda Smith, Pharm.D., M.B.A., [facility]  [facility] Pharmacist, [facility]  Pager: [phone]  Email: [email or web address]

## 2020-07-28 NOTE — PROGRESS NOTES
MTM ENCOUNTER  SUBJECTIVE/OBJECTIVE:                           Parmjit Hernández is a 65 year old male called for a follow-up visit. He was referred to me from Dr. Henry.  Today's visit is a follow-up MTM visit from 7/14/20.     Patient consented to a telehealth visit: yes  Telemedicine Visit Details  Type of service:  Telephone visit  Start Time: 3:13 PM  End Time: 3:29 PM  Originating Location (pt. Location): Home  Distant Location (provider location):  [facility] [place]  Mode of Communication:  Telephone    Chief Complaint: Follow-up on Januvia start and change in timing of HTN meds. He has also decided to go through with his back surgery - has a plan for aftercare, which was his obstacle at our last conversation.     Allergies/ADRs: Reviewed in EHR  Tobacco:  reports that he has never smoked. He has never used smokeless tobacco.  Alcohol: Rare use  PMH: Reviewed in EHR    Medication Adherence/Access: no issues reported    Type 2 Diabetes:  Pt currently taking metformin XR 2000 mg daily and Januvia 100mg daily. Pt is not experiencing side effects. Had one episode of heartburn, see details below. He still has some 2.5mg tablets of glipizide at home, but is nervous about starting a whole tablet given his past history of hypoglycemia. However, he notes that Dr. Henry expressed concerns about his fasting BG at his pre-op appointment yesterday.   SMBG: one time(s) daily. Ranges (patient reported): fasting readings: 144, 193, 152, 149, 151, 162 in the last week (this is down from the 180's). He takes his BG while on the phone with us today (about 4 hours PP) and it was 142.   Denies any hypoglycemia in the last two weeks.   Diet/Exercise: Working on cutting back on some of the sugar in his diet, but last night did have a vanilla shake and a cupcake.     Heartburn:  One episode occurring on 7/23 - chest pain, burning started at 4pm and ended at 11pm. In addition to this pain, he noticed his pulse was 123 and BP  was in 73/57 at onset of sx and then gradually increased to 101/70, pulse dropped to 96. He denies difficulty breathing, sweating, jaw/arm pain and specifically states this did not feel like Angina to him. He reports his pain was quite severe, and scared him (he was taking magnesium at the same time - could this be the cause as well?). Did not check his blood sugar during this episode either, but didn't feel like hypoglycemia to him either. He has had simialr heartburn symptoms at one other time, prior to starting his current medication regimen.  He did not try any abortive therapies for his symptoms since he has found them ineffective in the past.     Hypertension/Edema: Current medications include furosemide 20 mg daily, lisinopril 30 mg daily (middle of the day), and metoprolol succinate 50 mg daily (evening).  Patient does self-monitor BP. Home BP recent readings for the last few days - 120/80, 106/74 p 64, 149/84 p 68, 136/88 p 71. BP normal every day since episode listed above. BP at goal at clinic visit yesterday.     ASSESSMENT:                            Medication Adherence: no issues identified    Type 2 Diabetes:  Given that he is having surgery, will be more aggressive with his blood sugar management. He is nervous to start glipizide at 2.5mg daily, so will have him start with glimepiride 1mg - 1/2 tablet, increasing to 1 tablet if tolerated. Really encouraged him to cut back on the sweets.     Heartburn: Hasn't had any issues other than one episode, no red flag symptoms. Will continue to monitor. Just had a pre-op physical yesterday, no concerns noted on exam.     Hypertension/Edema: BP has been at goal. No changes needed.     PLAN:                            1. Start 1/2 tablet of glimepiride daily. Monitor for hypoglycemia.     I spent 15 minutes with this patient today. All changes were made via collaborative practice agreement with Dr. Henry. A copy of the visit note was provided to the patient's  primary care provider.    Will follow up on Thursday via [employer].    The patient was sent via [employer] a summary of these recommendations.     Gabino VizcainoD, TOSHIA, BCACP  MTM Pharmacist, [facility]

## 2020-07-29 ENCOUNTER — TRANSFERRED RECORDS (OUTPATIENT)
Dept: HEALTH INFORMATION MANAGEMENT | Facility: CLINIC | Age: 65
End: 2020-07-29

## 2020-07-29 DIAGNOSIS — E11.69 TYPE 2 DIABETES MELLITUS WITH OTHER SPECIFIED COMPLICATION, WITHOUT LONG-TERM CURRENT USE OF INSULIN (H): ICD-10-CM

## 2020-07-29 RX ORDER — GLIMEPIRIDE 1 MG/1
1 TABLET ORAL
Qty: 90 TABLET | Refills: 0 | Status: SHIPPED | OUTPATIENT
Start: 2020-07-29 | End: 2020-11-19

## 2020-07-29 NOTE — TELEPHONE ENCOUNTER
Current labs are similar to your previous labs.  You should be able to proceed with your scheduled procedure.     Elaine

## 2020-07-30 NOTE — TELEPHONE ENCOUNTER
Discharged from hospital today - was feeling well - got home and was cleaning the house. Has now been dizzy for past 90 mins - almost fell over once. Feels very weak. Pains in both shoulders and bilateral cheek pain. BP 99/75 initially - and then got an error code that it was too low.     Per protocol - advised ED evaluation - patient will call 911.    Michelle Antonio RN on 4/17/2020 at 6:57 PM

## 2020-07-31 ENCOUNTER — TELEPHONE (OUTPATIENT)
Dept: FAMILY MEDICINE | Facility: CLINIC | Age: 65
End: 2020-07-31

## 2020-07-31 NOTE — TELEPHONE ENCOUNTER
Reason for Call:  Form, our goal is to have forms completed with 72 hours, however, some forms may require a visit or additional information.    Type of letter, form or note: Preop forms, labs, and EKG  preop 7/27  Who is the form from?:     Where did the form come from:     What clinic location was the form placed at?:     Where the form was placed:     What number is listed as a contact on the form?:        Additional comments:   [facility]eng fax: [phone]  8/6/2020 procedure      Call taken on 7/31/2020 at 10:03 AM by Piotr Diaz

## 2020-08-03 PROBLEM — I83.009 VENOUS ULCER (H): Status: ACTIVE | Noted: 2020-08-03

## 2020-08-03 PROBLEM — L97.909 VENOUS ULCER (H): Status: ACTIVE | Noted: 2020-08-03

## 2020-08-04 ENCOUNTER — PATIENT OUTREACH (OUTPATIENT)
Dept: CARE COORDINATION | Facility: CLINIC | Age: 65
End: 2020-08-04

## 2020-08-04 NOTE — PROGRESS NOTES
Clinic Care Coordination Contact     Situation: Patient chart reviewed by care coordinator.     Background: Pts initial assessment and enrollment to Care Coordination was 5-.   Patient centered goals were developed with participation from patient.  RN CC handed patient off to CHW for continued outreach every 30 days.      Assessment: CHW has been in contact with patient monthly leaving a message recently. Patient has made some progress to goals.  He is scheduled for back surgery.      Plan/Recommendations: CHW will involve SW CC as needed or if patient is ready to move to maintenance.  SW CC will continue to monitor progress to goals and CHW outreaches every 6 weeks.     Care Plan updated and mailed to patient: no

## 2020-08-05 ENCOUNTER — PATIENT OUTREACH (OUTPATIENT)
Dept: CARE COORDINATION | Facility: CLINIC | Age: 65
End: 2020-08-05

## 2020-08-05 NOTE — PROGRESS NOTES
Clinic Care Coordination Contact  Community Health Worker Follow Up    Goals:   Goals Addressed as of 8/5/2020 at 12:03 PM                 6/24/20       Patient Stated      Psychosocial (pt-stated)   On track    Added 5/13/20 by Kelly Shine LSW     Goal Statement: I would like to look into getting a PCA in the next 2 months  Date Goal set: 5/13/20  Barriers: Eligibility  Strengths: Motivated  Date to Achieve By: 2 months  Patient expressed understanding of goal: Yes  Action steps to achieve this goal:  1.  called [employer] who confirms patient does not have this option for coverage.  2.  will send some low-cost home making options(completed)  3. I will update CHW during outreach calls and ask to speak with SW if needed     Updated: 8/5/2020                CHW Plan: follow-up in 1 month  Patient is doing well he stated that he is having neck surgery tomorrow has home care set up and has no concerns or questions.

## 2020-08-06 ENCOUNTER — TRANSFERRED RECORDS (OUTPATIENT)
Dept: HEALTH INFORMATION MANAGEMENT | Facility: CLINIC | Age: 65
End: 2020-08-06

## 2020-08-14 ENCOUNTER — TELEPHONE (OUTPATIENT)
Dept: FAMILY MEDICINE | Facility: CLINIC | Age: 65
End: 2020-08-14

## 2020-08-14 ENCOUNTER — TELEPHONE (OUTPATIENT)
Dept: CARDIOLOGY | Facility: CLINIC | Age: 65
End: 2020-08-14

## 2020-08-14 NOTE — TELEPHONE ENCOUNTER
Transferred call from scheduling patient last evening while watching TV had 3 episodes of chest pressure, denied uma Shortness of breath, numbness or tingling in extremities, non radiating pain.  Reviewed chart and was suppose to have a lexiscan in June but due to recent back pain was unable to.  His PCP ordered and he is willing to have test done now.  Patient aware if pain persists he will go to the ED.  He also spoke with his PCP today and they recommended going to the ED.  He denies any chest pain now and has agreed to go into ED for further evaluation if pain returns and is worse.. Patient verbalized understanding and agreed to plan of care.

## 2020-08-14 NOTE — TELEPHONE ENCOUNTER
Reason for call:  Patient reporting a symptom    Symptom or request:  Mild chest pain    Duration (how long have symptoms been present):  2 wks    Have you been treated for this before?  no    Additional comments:  Pt is complaining of mild chest pain sx on and off for  2 wks RED FLAG trans to triage    Phone Number patient can be reached at:  Home number on file [phone] (home)    Best Time:  any    Can we leave a detailed message on this number:  YES    Call taken on 8/14/2020 at 11:57 AM by Jarrell Glasgow

## 2020-08-14 NOTE — TELEPHONE ENCOUNTER
Patient should be seen at the ER for evaluation.     If he decided not to go to the ER, advise him to schedule a visit with cardiology. I would like to allow them to determine the cause of the chest pain and decide the appropriate diagnostic work-up    Patient had a virtual visit with cardiology in 06/2020    Elaine

## 2020-08-14 NOTE — TELEPHONE ENCOUNTER
"FS,  Pt says he has had intermittent chest pain for the last few months.  Pain is located \"in my heart,\" in the center and left chest and the size of a fist per pt. Pain is deep  He says he gets the pain every 3-4 days.  Rates pain 3-4/10, describes it as \"mildly sharp,\"  Has pain for an hour then goes a way for an hour then has it for an hour ect- cyclic episodes last for a few hours.  Severity, duration and frequency are not worsening since starting a few months ago.  Denies any sob, radiating pain or dizziness now or with episodes.  He is wondering if he should get an angiogram as previous one is old per pt.    He says he had back surgery last week and that is better and that pain was different.    Should I schedule him in here next week or UC/ER today?    Please advise.  Thanks,  Norma Casillas RN      "

## 2020-08-17 ENCOUNTER — HOSPITAL ENCOUNTER (OUTPATIENT)
Facility: CLINIC | Age: 65
Setting detail: OBSERVATION
Discharge: HOME | End: 2020-08-18
Attending: EMERGENCY MEDICINE | Admitting: HOSPITALIST
Payer: COMMERCIAL

## 2020-08-17 ENCOUNTER — APPOINTMENT (OUTPATIENT)
Dept: CT IMAGING | Facility: CLINIC | Age: 65
End: 2020-08-17
Attending: EMERGENCY MEDICINE
Payer: COMMERCIAL

## 2020-08-17 DIAGNOSIS — R07.9 CHEST PAIN, UNSPECIFIED TYPE: ICD-10-CM

## 2020-08-17 DIAGNOSIS — M62.81 GENERALIZED MUSCLE WEAKNESS: ICD-10-CM

## 2020-08-17 LAB
ALBUMIN SERPL-MCNC: 3.2 G/DL (ref 3.4–5)
ALP SERPL-CCNC: 72 U/L (ref 40–150)
ALT SERPL W P-5'-P-CCNC: 35 U/L (ref 0–70)
ANION GAP SERPL CALCULATED.3IONS-SCNC: 5 MMOL/L (ref 3–14)
ANION GAP SERPL CALCULATED.3IONS-SCNC: 6 MMOL/L (ref 3–14)
AST SERPL W P-5'-P-CCNC: 16 U/L (ref 0–45)
BASOPHILS # BLD AUTO: 0 10E9/L (ref 0–0.2)
BASOPHILS NFR BLD AUTO: 0.4 %
BILIRUB SERPL-MCNC: 0.3 MG/DL (ref 0.2–1.3)
BUN SERPL-MCNC: 37 MG/DL (ref 7–30)
BUN SERPL-MCNC: 38 MG/DL (ref 7–30)
CALCIUM SERPL-MCNC: 7.6 MG/DL (ref 8.5–10.1)
CALCIUM SERPL-MCNC: 8.1 MG/DL (ref 8.5–10.1)
CHLORIDE SERPL-SCNC: 111 MMOL/L (ref 94–109)
CHLORIDE SERPL-SCNC: 113 MMOL/L (ref 94–109)
CO2 SERPL-SCNC: 23 MMOL/L (ref 20–32)
CO2 SERPL-SCNC: 23 MMOL/L (ref 20–32)
CREAT SERPL-MCNC: 1.48 MG/DL (ref 0.66–1.25)
CREAT SERPL-MCNC: 1.6 MG/DL (ref 0.66–1.25)
D DIMER PPP FEU-MCNC: 0.7 UG/ML FEU (ref 0–0.5)
DIFFERENTIAL METHOD BLD: ABNORMAL
EOSINOPHIL # BLD AUTO: 0.2 10E9/L (ref 0–0.7)
EOSINOPHIL NFR BLD AUTO: 4.5 %
ERYTHROCYTE [DISTWIDTH] IN BLOOD BY AUTOMATED COUNT: 13.6 % (ref 10–15)
GFR SERPL CREATININE-BSD FRML MDRD: 45 ML/MIN/{1.73_M2}
GFR SERPL CREATININE-BSD FRML MDRD: 49 ML/MIN/{1.73_M2}
GLUCOSE BLDC GLUCOMTR-MCNC: 108 MG/DL (ref 70–99)
GLUCOSE BLDC GLUCOMTR-MCNC: 112 MG/DL (ref 70–99)
GLUCOSE BLDC GLUCOMTR-MCNC: 118 MG/DL (ref 70–99)
GLUCOSE BLDC GLUCOMTR-MCNC: 151 MG/DL (ref 70–99)
GLUCOSE BLDC GLUCOMTR-MCNC: 160 MG/DL (ref 70–99)
GLUCOSE SERPL-MCNC: 119 MG/DL (ref 70–99)
GLUCOSE SERPL-MCNC: 151 MG/DL (ref 70–99)
HCT VFR BLD AUTO: 31.3 % (ref 40–53)
HGB BLD-MCNC: 10.6 G/DL (ref 13.3–17.7)
IMM GRANULOCYTES # BLD: 0 10E9/L (ref 0–0.4)
IMM GRANULOCYTES NFR BLD: 0.4 %
INTERPRETATION ECG - MUSE: NORMAL
LYMPHOCYTES # BLD AUTO: 0.7 10E9/L (ref 0.8–5.3)
LYMPHOCYTES NFR BLD AUTO: 13.6 %
MCH RBC QN AUTO: 32.1 PG (ref 26.5–33)
MCHC RBC AUTO-ENTMCNC: 33.9 G/DL (ref 31.5–36.5)
MCV RBC AUTO: 95 FL (ref 78–100)
MONOCYTES # BLD AUTO: 0.4 10E9/L (ref 0–1.3)
MONOCYTES NFR BLD AUTO: 8 %
NEUTROPHILS # BLD AUTO: 3.6 10E9/L (ref 1.6–8.3)
NEUTROPHILS NFR BLD AUTO: 73.1 %
NT-PROBNP SERPL-MCNC: 206 PG/ML (ref 0–900)
PLATELET # BLD AUTO: 133 10E9/L (ref 150–450)
POTASSIUM SERPL-SCNC: 4.3 MMOL/L (ref 3.4–5.3)
POTASSIUM SERPL-SCNC: 4.5 MMOL/L (ref 3.4–5.3)
PROT SERPL-MCNC: 5.9 G/DL (ref 6.8–8.8)
RBC # BLD AUTO: 3.3 10E12/L (ref 4.4–5.9)
SODIUM SERPL-SCNC: 140 MMOL/L (ref 133–144)
SODIUM SERPL-SCNC: 141 MMOL/L (ref 133–144)
TROPONIN I SERPL-MCNC: 0.06 UG/L (ref 0–0.04)
WBC # BLD AUTO: 4.9 10E9/L (ref 4–11)

## 2020-08-17 PROCEDURE — 25000132 ZZH RX MED GY IP 250 OP 250 PS 637: Performed by: HOSPITALIST

## 2020-08-17 PROCEDURE — 80048 BASIC METABOLIC PNL TOTAL CA: CPT | Performed by: HOSPITALIST

## 2020-08-17 PROCEDURE — U0003 INFECTIOUS AGENT DETECTION BY NUCLEIC ACID (DNA OR RNA); SEVERE ACUTE RESPIRATORY SYNDROME CORONAVIRUS 2 (SARS-COV-2) (CORONAVIRUS DISEASE [COVID-19]), AMPLIFIED PROBE TECHNIQUE, MAKING USE OF HIGH THROUGHPUT TECHNOLOGIES AS DESCRIBED BY CMS-2020-01-R: HCPCS | Performed by: EMERGENCY MEDICINE

## 2020-08-17 PROCEDURE — 99207 ZZC NON-BILLABLE SERV PER CHARTING: CPT | Performed by: HOSPITALIST

## 2020-08-17 PROCEDURE — 96374 THER/PROPH/DIAG INJ IV PUSH: CPT | Mod: 59

## 2020-08-17 PROCEDURE — 85025 COMPLETE CBC W/AUTO DIFF WBC: CPT | Performed by: EMERGENCY MEDICINE

## 2020-08-17 PROCEDURE — 25000132 ZZH RX MED GY IP 250 OP 250 PS 637: Performed by: EMERGENCY MEDICINE

## 2020-08-17 PROCEDURE — 25000125 ZZHC RX 250: Performed by: EMERGENCY MEDICINE

## 2020-08-17 PROCEDURE — 25000128 H RX IP 250 OP 636: Performed by: EMERGENCY MEDICINE

## 2020-08-17 PROCEDURE — 83880 ASSAY OF NATRIURETIC PEPTIDE: CPT | Performed by: EMERGENCY MEDICINE

## 2020-08-17 PROCEDURE — 85379 FIBRIN DEGRADATION QUANT: CPT | Performed by: EMERGENCY MEDICINE

## 2020-08-17 PROCEDURE — 25800030 ZZH RX IP 258 OP 636: Performed by: HOSPITALIST

## 2020-08-17 PROCEDURE — 99215 OFFICE O/P EST HI 40 MIN: CPT | Performed by: INTERNAL MEDICINE

## 2020-08-17 PROCEDURE — 84484 ASSAY OF TROPONIN QUANT: CPT | Mod: 91 | Performed by: HOSPITALIST

## 2020-08-17 PROCEDURE — 25000128 H RX IP 250 OP 636: Performed by: HOSPITALIST

## 2020-08-17 PROCEDURE — 25000132 ZZH RX MED GY IP 250 OP 250 PS 637: Performed by: INTERNAL MEDICINE

## 2020-08-17 PROCEDURE — 80053 COMPREHEN METABOLIC PANEL: CPT | Performed by: EMERGENCY MEDICINE

## 2020-08-17 PROCEDURE — 96372 THER/PROPH/DIAG INJ SC/IM: CPT

## 2020-08-17 PROCEDURE — 99285 EMERGENCY DEPT VISIT HI MDM: CPT | Mod: 25

## 2020-08-17 PROCEDURE — 25000131 ZZH RX MED GY IP 250 OP 636 PS 637: Performed by: HOSPITALIST

## 2020-08-17 PROCEDURE — 99220 ZZC INITIAL OBSERVATION CARE,LEVL III: CPT | Performed by: HOSPITALIST

## 2020-08-17 PROCEDURE — 00000146 ZZHCL STATISTIC GLUCOSE BY METER IP

## 2020-08-17 PROCEDURE — 71275 CT ANGIOGRAPHY CHEST: CPT

## 2020-08-17 PROCEDURE — 84484 ASSAY OF TROPONIN QUANT: CPT | Mod: 91 | Performed by: EMERGENCY MEDICINE

## 2020-08-17 PROCEDURE — G0378 HOSPITAL OBSERVATION PER HR: HCPCS

## 2020-08-17 PROCEDURE — C9803 HOPD COVID-19 SPEC COLLECT: HCPCS

## 2020-08-17 PROCEDURE — 93005 ELECTROCARDIOGRAM TRACING: CPT

## 2020-08-17 PROCEDURE — 36415 COLL VENOUS BLD VENIPUNCTURE: CPT | Performed by: HOSPITALIST

## 2020-08-17 RX ORDER — ACETAMINOPHEN 650 MG/1
650 SUPPOSITORY RECTAL EVERY 4 HOURS PRN
Status: DISCONTINUED | OUTPATIENT
Start: 2020-08-17 | End: 2020-08-18 | Stop reason: HOSPADM

## 2020-08-17 RX ORDER — ACETAMINOPHEN 500 MG
1000 TABLET ORAL PRN
Status: ON HOLD | COMMUNITY
End: 2020-08-18

## 2020-08-17 RX ORDER — AMOXICILLIN 250 MG
2 CAPSULE ORAL EVERY EVENING
Status: DISCONTINUED | OUTPATIENT
Start: 2020-08-17 | End: 2020-08-18 | Stop reason: HOSPADM

## 2020-08-17 RX ORDER — LORAZEPAM 2 MG/ML
1 INJECTION INTRAMUSCULAR
Status: COMPLETED | OUTPATIENT
Start: 2020-08-17 | End: 2020-08-18

## 2020-08-17 RX ORDER — DIVALPROEX SODIUM 500 MG/1
1000 TABLET, DELAYED RELEASE ORAL DAILY
Status: DISCONTINUED | OUTPATIENT
Start: 2020-08-17 | End: 2020-08-18 | Stop reason: HOSPADM

## 2020-08-17 RX ORDER — AMOXICILLIN 250 MG
2 CAPSULE ORAL EVERY EVENING
COMMUNITY
End: 2020-09-18

## 2020-08-17 RX ORDER — ASPIRIN 81 MG/1
81 TABLET ORAL DAILY
Status: DISCONTINUED | OUTPATIENT
Start: 2020-08-18 | End: 2020-08-18 | Stop reason: HOSPADM

## 2020-08-17 RX ORDER — ACETAMINOPHEN 325 MG/1
650 TABLET ORAL EVERY 4 HOURS PRN
Status: DISCONTINUED | OUTPATIENT
Start: 2020-08-17 | End: 2020-08-18 | Stop reason: HOSPADM

## 2020-08-17 RX ORDER — IBUPROFEN 200 MG
800 TABLET ORAL EVERY 8 HOURS PRN
Status: ON HOLD | COMMUNITY
End: 2020-08-18

## 2020-08-17 RX ORDER — NICOTINE POLACRILEX 4 MG
15-30 LOZENGE BUCCAL
Status: DISCONTINUED | OUTPATIENT
Start: 2020-08-17 | End: 2020-08-18 | Stop reason: HOSPADM

## 2020-08-17 RX ORDER — METOPROLOL TARTRATE 25 MG/1
25 TABLET, FILM COATED ORAL 2 TIMES DAILY
Status: DISCONTINUED | OUTPATIENT
Start: 2020-08-17 | End: 2020-08-18 | Stop reason: ALTCHOICE

## 2020-08-17 RX ORDER — DIAZEPAM 5 MG
5 TABLET ORAL
Status: CANCELLED | OUTPATIENT
Start: 2020-08-17

## 2020-08-17 RX ORDER — ONDANSETRON 4 MG/1
4 TABLET, ORALLY DISINTEGRATING ORAL EVERY 6 HOURS PRN
Status: DISCONTINUED | OUTPATIENT
Start: 2020-08-17 | End: 2020-08-18 | Stop reason: HOSPADM

## 2020-08-17 RX ORDER — DEXTROSE MONOHYDRATE 25 G/50ML
25-50 INJECTION, SOLUTION INTRAVENOUS
Status: DISCONTINUED | OUTPATIENT
Start: 2020-08-17 | End: 2020-08-18 | Stop reason: HOSPADM

## 2020-08-17 RX ORDER — LURASIDONE HYDROCHLORIDE 60 MG/1
60 TABLET, FILM COATED ORAL AT BEDTIME
Status: DISCONTINUED | OUTPATIENT
Start: 2020-08-17 | End: 2020-08-18 | Stop reason: HOSPADM

## 2020-08-17 RX ORDER — ACETAMINOPHEN 500 MG
500 TABLET ORAL 4 TIMES DAILY
Status: DISCONTINUED | OUTPATIENT
Start: 2020-08-17 | End: 2020-08-18 | Stop reason: HOSPADM

## 2020-08-17 RX ORDER — MODAFINIL 200 MG/1
200 TABLET ORAL DAILY
Status: DISCONTINUED | OUTPATIENT
Start: 2020-08-17 | End: 2020-08-18 | Stop reason: HOSPADM

## 2020-08-17 RX ORDER — OXYCODONE HYDROCHLORIDE 5 MG/1
5 TABLET ORAL EVERY 4 HOURS PRN
Status: DISCONTINUED | OUTPATIENT
Start: 2020-08-17 | End: 2020-08-18 | Stop reason: HOSPADM

## 2020-08-17 RX ORDER — ASPIRIN 81 MG/1
324 TABLET, CHEWABLE ORAL ONCE
Status: COMPLETED | OUTPATIENT
Start: 2020-08-17 | End: 2020-08-17

## 2020-08-17 RX ORDER — NALOXONE HYDROCHLORIDE 0.4 MG/ML
.1-.4 INJECTION, SOLUTION INTRAMUSCULAR; INTRAVENOUS; SUBCUTANEOUS
Status: DISCONTINUED | OUTPATIENT
Start: 2020-08-17 | End: 2020-08-18 | Stop reason: HOSPADM

## 2020-08-17 RX ORDER — AMOXICILLIN 250 MG
1 CAPSULE ORAL DAILY
Status: DISCONTINUED | OUTPATIENT
Start: 2020-08-17 | End: 2020-08-18 | Stop reason: HOSPADM

## 2020-08-17 RX ORDER — METOPROLOL SUCCINATE 50 MG/1
50 TABLET, EXTENDED RELEASE ORAL DAILY
Status: DISCONTINUED | OUTPATIENT
Start: 2020-08-17 | End: 2020-08-17 | Stop reason: DRUGHIGH

## 2020-08-17 RX ORDER — ASPIRIN 81 MG/1
162 TABLET, CHEWABLE ORAL ONCE
Status: DISCONTINUED | OUTPATIENT
Start: 2020-08-17 | End: 2020-08-18 | Stop reason: HOSPADM

## 2020-08-17 RX ORDER — LIDOCAINE 40 MG/G
CREAM TOPICAL
Status: DISCONTINUED | OUTPATIENT
Start: 2020-08-17 | End: 2020-08-18 | Stop reason: HOSPADM

## 2020-08-17 RX ORDER — ALUMINA, MAGNESIA, AND SIMETHICONE 2400; 2400; 240 MG/30ML; MG/30ML; MG/30ML
30 SUSPENSION ORAL EVERY 4 HOURS PRN
Status: DISCONTINUED | OUTPATIENT
Start: 2020-08-17 | End: 2020-08-18 | Stop reason: HOSPADM

## 2020-08-17 RX ORDER — TRAZODONE HYDROCHLORIDE 50 MG/1
50-100 TABLET, FILM COATED ORAL
Status: DISCONTINUED | OUTPATIENT
Start: 2020-08-17 | End: 2020-08-18 | Stop reason: HOSPADM

## 2020-08-17 RX ORDER — LORAZEPAM 2 MG/ML
.5-1 INJECTION INTRAMUSCULAR EVERY 4 HOURS PRN
Status: DISCONTINUED | OUTPATIENT
Start: 2020-08-17 | End: 2020-08-18 | Stop reason: HOSPADM

## 2020-08-17 RX ORDER — LANOLIN ALCOHOL/MO/W.PET/CERES
3 CREAM (GRAM) TOPICAL
Status: DISCONTINUED | OUTPATIENT
Start: 2020-08-17 | End: 2020-08-18 | Stop reason: HOSPADM

## 2020-08-17 RX ORDER — ATORVASTATIN CALCIUM 20 MG/1
20 TABLET, FILM COATED ORAL DAILY
Status: DISCONTINUED | OUTPATIENT
Start: 2020-08-17 | End: 2020-08-18 | Stop reason: HOSPADM

## 2020-08-17 RX ORDER — IOPAMIDOL 755 MG/ML
80 INJECTION, SOLUTION INTRAVASCULAR ONCE
Status: COMPLETED | OUTPATIENT
Start: 2020-08-17 | End: 2020-08-17

## 2020-08-17 RX ORDER — NITROGLYCERIN 0.4 MG/1
0.4 TABLET SUBLINGUAL EVERY 5 MIN PRN
Status: DISCONTINUED | OUTPATIENT
Start: 2020-08-17 | End: 2020-08-18 | Stop reason: HOSPADM

## 2020-08-17 RX ORDER — SODIUM CHLORIDE 9 MG/ML
INJECTION, SOLUTION INTRAVENOUS CONTINUOUS
Status: ACTIVE | OUTPATIENT
Start: 2020-08-17 | End: 2020-08-17

## 2020-08-17 RX ORDER — PREGABALIN 100 MG/1
100 CAPSULE ORAL 3 TIMES DAILY
Status: DISCONTINUED | OUTPATIENT
Start: 2020-08-17 | End: 2020-08-18 | Stop reason: HOSPADM

## 2020-08-17 RX ORDER — TAMSULOSIN HYDROCHLORIDE 0.4 MG/1
0.4 CAPSULE ORAL 2 TIMES DAILY
Status: DISCONTINUED | OUTPATIENT
Start: 2020-08-17 | End: 2020-08-18 | Stop reason: HOSPADM

## 2020-08-17 RX ORDER — ONDANSETRON 2 MG/ML
4 INJECTION INTRAMUSCULAR; INTRAVENOUS EVERY 6 HOURS PRN
Status: DISCONTINUED | OUTPATIENT
Start: 2020-08-17 | End: 2020-08-18 | Stop reason: HOSPADM

## 2020-08-17 RX ADMIN — SODIUM CHLORIDE: 9 INJECTION, SOLUTION INTRAVENOUS at 05:55

## 2020-08-17 RX ADMIN — OXYCODONE HYDROCHLORIDE 5 MG: 5 TABLET ORAL at 23:34

## 2020-08-17 RX ADMIN — LORAZEPAM 0.5 MG: 2 INJECTION INTRAMUSCULAR; INTRAVENOUS at 21:12

## 2020-08-17 RX ADMIN — SODIUM CHLORIDE 100 ML: 9 INJECTION, SOLUTION INTRAVENOUS at 03:11

## 2020-08-17 RX ADMIN — INSULIN ASPART 1 UNITS: 100 INJECTION, SOLUTION INTRAVENOUS; SUBCUTANEOUS at 18:13

## 2020-08-17 RX ADMIN — OXYCODONE HYDROCHLORIDE 5 MG: 5 TABLET ORAL at 18:41

## 2020-08-17 RX ADMIN — SENNOSIDES AND DOCUSATE SODIUM 1 TABLET: 8.6; 5 TABLET ORAL at 16:30

## 2020-08-17 RX ADMIN — ACETAMINOPHEN 650 MG: 325 TABLET, FILM COATED ORAL at 08:04

## 2020-08-17 RX ADMIN — METOPROLOL TARTRATE 25 MG: 25 TABLET, FILM COATED ORAL at 21:49

## 2020-08-17 RX ADMIN — PREGABALIN 100 MG: 100 CAPSULE ORAL at 21:49

## 2020-08-17 RX ADMIN — ASPIRIN 81 MG 324 MG: 81 TABLET ORAL at 01:41

## 2020-08-17 RX ADMIN — LURASIDONE HYDROCHLORIDE 60 MG: 60 TABLET, FILM COATED ORAL at 21:49

## 2020-08-17 RX ADMIN — ATORVASTATIN CALCIUM 20 MG: 20 TABLET, FILM COATED ORAL at 16:06

## 2020-08-17 RX ADMIN — IOPAMIDOL 80 ML: 755 INJECTION, SOLUTION INTRAVENOUS at 03:11

## 2020-08-17 RX ADMIN — ACETAMINOPHEN 500 MG: 500 TABLET, FILM COATED ORAL at 21:48

## 2020-08-17 RX ADMIN — PREGABALIN 100 MG: 100 CAPSULE ORAL at 18:10

## 2020-08-17 RX ADMIN — ACETAMINOPHEN 500 MG: 500 TABLET, FILM COATED ORAL at 18:10

## 2020-08-17 RX ADMIN — DIVALPROEX SODIUM 1000 MG: 500 TABLET, DELAYED RELEASE ORAL at 21:48

## 2020-08-17 RX ADMIN — TAMSULOSIN HYDROCHLORIDE 0.4 MG: 0.4 CAPSULE ORAL at 21:48

## 2020-08-17 RX ADMIN — TRAZODONE HYDROCHLORIDE 50 MG: 50 TABLET ORAL at 23:28

## 2020-08-17 ASSESSMENT — ENCOUNTER SYMPTOMS
WEAKNESS: 1
VOMITING: 0
NAUSEA: 0
DIZZINESS: 0
BACK PAIN: 1
DIAPHORESIS: 1

## 2020-08-17 ASSESSMENT — MIFFLIN-ST. JEOR
SCORE: 1947.49
SCORE: 1918.91

## 2020-08-17 NOTE — PLAN OF CARE
A&O x4. SBA. Tele SR w/ BBB. Lungs clear. Tolerating diet. Told pt to be NPO before cards consult. Voiding without difficulty. IVF maintained. Plan is to be seen by cards per shift. Orthos neg. Trops stable. Monitor Creatinine. Encourage ambulation.    20:00 EDIT: Pt having panic attack. Ativan ordered for PRN. Pt concerned about sleep status. Trazodone ordered as well. Cardiac MRI still sched for tomorrow. Oxy PRN for back pain and tylenol sched.

## 2020-08-17 NOTE — UTILIZATION REVIEW
"  Admission Status; Secondary Review Determination         Under the authority of the Utilization Management Committee, the utilization review process indicated a secondary review on the above patient.  The review outcome is based on review of the medical records, discussions with staff, and applying clinical experience noted on the date of the review.        ()      Inpatient Status Appropriate - This patient's medical care is consistent with medical management for inpatient care and reasonable inpatient medical practice.      (x) Observation Status Appropriate - This patient does not meet hospital inpatient criteria and is placed in observation status. If this patient's primary payer is Medicare and was admitted as an inpatient, Condition Code 44 should be used and patient status changed to \"observation\".   () Admission Status NOT Appropriate - This patient's medical care is not consistent with medical management for Inpatient or Observation Status.          RATIONALE FOR DETERMINATION   The patient is a 65-year-old gentleman who has a history of systemic amyloidosis chronic bipolar disorder along with type 2 diabetes mellitus who came to the emergency room today for evaluation of some weakness along with shoulder pain and possibly some chest pain.  He had a normal angiogram without coronary artery disease in 2018.  His troponins have been slightly elevated at 0.061×2.  He does have a creatinine of 1.48 which is coming down with fluids.  He possibly had a lisinopril unintentional overdose with a peak of 2.09 at the end of July..  Cardiology was consulted and recommends a cardiac MRI tomorrow.  He is on insulin sliding scale while hospitalized.  Based on low likelihood of acute coronary syndrome, observation status remains appropriate.  The case was discussed with system utilization review medical director.       The severity of illness, intensity of service provided, expected LOS and risk for adverse outcome make " the care complex, high risk and appropriate for hospital admission.        The information on this document is developed by the utilization review team in order for the business office to ensure compliance.  This only denotes the appropriateness of proper admission status and does not reflect the quality of care rendered.         The definitions of Inpatient Status and Observation Status used in making the determination above are those provided in the CMS Coverage Manual, Chapter 1 and Chapter 6, section 70.4.      Sincerely,     Prashant Kirk MD  Physician Advisor  Utilization Review/ Case Management  [facility].

## 2020-08-17 NOTE — PROGRESS NOTES
RECEIVING UNIT ED HANDOFF REVIEW    ED Nurse Handoff Report was reviewed by: Darwin Hdz RN on August 17, 2020 at 4:31 AM

## 2020-08-17 NOTE — PLAN OF CARE
Admit to  [facility] at 0500. Denies pain/chest pain. Up SBA. Pt refusing upper side rails due to claustrophobia, calm cooperative and compliant with other fall risk precautions. A&O pleasant/talkative. QID sugars. Continue to monitor.

## 2020-08-17 NOTE — PHARMACY-ADMISSION MEDICATION HISTORY
Pharmacy Medication History  Admission medication history interview status for the 8/17/2020  admission is complete. See EPIC admission navigator for prior to admission medications     Medication history sources: Patient  Medication history source reliability: Good  Adherence assessment: Good    Medication reconciliation completed by provider prior to medication history? No    Time spent in this activity: 15 minutes      Prior to Admission medications    Medication Sig Last Dose Taking? Auth Provider   acetaminophen (TYLENOL) 500 MG tablet Take 1,000 mg by mouth as needed for mild pain prn med Yes Unknown, Entered By History   alpha-lipoic acid 100 MG capsule Take 200 mg by mouth daily 8/16/2020 at 1400 Yes Reported, Patient   aspirin (ASPIRIN LOW DOSE) 81 MG tablet Take 1 tablet (81 mg) by mouth daily 8/16/2020 at 1400 Yes Vince Henry MD   atorvastatin (LIPITOR) 20 MG tablet Take 1 tablet (20 mg) by mouth daily 8/16/2020 at 1400 Yes Bernie Nazario MD   caffeine (NO-DOZE) 200 MG TABS tablet Take 400 mg by mouth daily 8/16/2020 at 1400 Yes Reported, Patient   clobetasol (TEMOVATE) 0.05 % external ointment Apply topically daily  8/16/2020 at 1400 Yes Reported, Patient   divalproex sodium delayed-release (DEPAKOTE) 500 MG DR tablet Take 2 tablets (1,000 mg) by mouth daily 8/15/2020 at pm Yes Maribeth Ardon MD   furosemide (LASIX) 20 MG tablet Take 1 tablet (20 mg) by mouth daily 8/16/2020 at 1400 Yes Bernie Nazario MD   glimepiride (AMARYL) 1 MG tablet Take 1 tablet (1 mg) by mouth every morning (before breakfast) 8/16/2020 at 1400 Yes Vince Henry MD   ibuprofen (ADVIL/MOTRIN) 200 MG tablet Take 800 mg by mouth as needed for mild pain prn med Yes Unknown, Entered By History   lisinopril (ZESTRIL) 20 MG tablet Take 1.5 tablets (30 mg) by mouth daily 8/16/2020 at 1400 Yes Bernie Nazario MD   metFORMIN (GLUCOPHAGE-XR) 500 MG 24 hr tablet Take 1000mg in AM and 1000mg in PM  8/16/2020 at x 2 Yes Bernie Naazrio MD   modafinil (PROVIGIL) 200 MG tablet daily  8/16/2020 at 1400 Yes Reported, Patient   Multiple Vitamins-Minerals (SENIOR MULTIVITAMIN PLUS PO) Take 1 tablet by mouth daily 8/16/2020 at 1400 Yes Reported, Patient   NONFORMULARY by Intrathecal route continuous - + up to 4 boluses daily    Managed by [facility]     Medications in Pump:  fentanyl 2000mcg/mL  Bupivacaine 20mg/mL  Morphine 5.8mg/mL     Rate:   Fentanyl 1200mcg/day  Bupivacaine 12mg/day  Morphine 4.2mg/day  Pump Last Fill Date:  4/16/2020  Yes Unknown, Entered By History   omega-3 acid ethyl esters (LOVAZA) 1 g capsule Take 1 capsule (1 g) by mouth daily 8/16/2020 at 1400 Yes Bernie Nazario MD   pregabalin (LYRICA) 100 MG capsule Take 1 capsule (100 mg) by mouth 3 times daily 8/16/2020 at x 2 Yes Vince Henry MD   senaaron-docanalite (SENOKOT-S/PERICOLACE) 8.6-50 MG tablet Take 2 tablets by mouth every evening 8/16/2020 at pm Yes Unknown, Entered By History   senna-docusate (SENOKOT-S/PERICOLACE) 8.6-50 MG tablet Take 1 tablet by mouth daily Take 1 tablet in the morning and 2 tablets in the evening.  8/16/2020 at 1400 Yes Reported, Patient   sitagliptin (JANUVIA) 100 MG tablet Take 1 tablet (100 mg) by mouth daily 8/16/2020 at 1400 Yes Vince Henry MD   SUMAtriptan (IMITREX) 50 MG tablet Take 1 tablet (50 mg) by mouth at onset of headache for migraine May repeat in 2 hours. Max 4 tablets/24 hours. prn med Yes Maribeth Ardon MD   tamsulosin (FLOMAX) 0.4 MG capsule Take 1 capsule (0.4 mg) by mouth 2 times daily 8/16/2020 at x 2 Yes Vince Henry MD   Turmeric 500 MG CAPS Take 1 capsule by mouth daily 8/16/2020 at 1400 Yes Reported, Patient   vitamin C (ASCORBIC ACID) 1000 MG TABS Take 1,000 mg by mouth daily 8/16/2020 at 1400 Yes Reported, Patient   blood glucose (ACCU-CHEK COMPACT DRUM) test strip Use to test blood sugars 3 to 4 times daily.   Elaine  Vince Cantor MD   blood glucose monitoring (ACCU-CHEK COMPACT CARE KIT) meter device kit Use to test blood sugars 3 to 4  times daily.   Vince Henry MD   blood glucose monitoring (SOFTCLIX) lancets USE TO TEST BLOOD SUGAR 3-4 TIMES DAILY OR AS DIRECTED.   Reported, Patient   lurasidone (LATUDA) 60 MG TABS tablet Take 1 tablet (60 mg) by mouth At Bedtime 8/15/2020 at pm  Vince Henry MD   metoprolol succinate ER (TOPROL-XL) 50 MG 24 hr tablet TAKE 1 TABLET(50 MG) BY MOUTH DAILY 8/15/2020 at pm  Maribeth Ardon MD Beth Mulder, PharmD

## 2020-08-17 NOTE — ED NOTES
[facility]  ED Nurse Handoff Report    ED Chief complaint: Generalized Weakness      ED Diagnosis:   Final diagnoses:   Chest pain, unspecified type   Generalized muscle weakness       Code Status: Full Code    Allergies:   Allergies   Allergen Reactions     Cephalexin Diarrhea     Liraglutide Other (See Comments)     Sulfa Drugs Swelling     Pt has taken  Taken sulfa drugs orally without trouble. He had problems with Sulfa eye drops. Eye swelled up     Victoza      Increased migraine frequency and severity       Patient Story: Pt presents with generalized weakness and intermittent chest and upper back pain. Pt had lumbar spine surgery done at [facility] 1 week ago and he describes an incident since where his left leg gave out and he fell onto his butt. Pt did schedule at stress test for 3 days from now.  Focused Assessment:  Pt feeling better at this time. Pt has been ambulating to the bathroom independently.    Treatments and/or interventions provided: ASA  Patient's response to treatments and/or interventions: see above    To be done/followed up on inpatient unit:  none pending     Does this patient have any cognitive concerns?: none    Activity level - Baseline/Home:  Independent  Activity Level - Current:   Independent    Patient's Preferred language: English   Needed?: No    Isolation: None  Infection: Not Applicable  Bariatric?: No    Vital Signs:   Vitals:    08/17/20 0200 08/17/20 0220 08/17/20 0336 08/17/20 0337   BP:  (!) 116/107 119/69    Pulse: 86  77    Resp:       Temp:       TempSrc:       SpO2:    94%   Weight:       Height:           Cardiac Rhythm:     Was the PSS-3 completed:   Yes  What interventions are required if any?               Family Comments: None present.  OBS brochure/video discussed/provided to patient/family: N/A              Name of person given brochure if not patient: N/A              Relationship to patient: N/A    For the majority of the shift this  patient's behavior was Green.   Behavioral interventions performed were information.    ED NURSE PHONE NUMBER: [phone]

## 2020-08-17 NOTE — PROGRESS NOTES
Brief hospitalist note  Seen and examined  65 m with amyloid, bipolar, opiate dep, diabetes, htn, and hld admitted for atypical chest symptoms and myonecrosis.      S  Admitted with episode of falling where it felt like his left leg gave way. He is 10 days out from spinal surgery. He was standing for 1.5 hours in the heat yesterday where as he is only accustomed to standing 5-10 minutes at a time. He did not have any prodrome of nausea, lightheadedness or chest pain. He did not lose consciousness.     After the event, his weakness did recover locally in the left leg, but he did feel generally weak and dizzy. He had some tightness in his chest and shoulder so called 911. He is in the process of undergoing lexiscan stress testing ordered by his PCP (his cardiology team was recommending MRI) and has a lexiscan scheduled on Thursday of this week.    He was found to have a mild troponin elevation and was admitted. He currently has an KWABENA    He know feels back to normal but has some concerns that his symptoms were heart related and is wondering if an angiogram is appropriate.    O:  Gen: NAD  Chest: RRR, no murmurs. No JVD  Lungs: CTAB  Neuro: leg strength 5/5 bilaterally    A  Shoulder/chest discomfort atypical for angina  Chronic myonecrosis  KWABENA likely secondary to prerenal  Episodic left leg weakness    P  - cards cons  - continue IVF, hold ibuprofen  - ambulate, monitor orthostatics

## 2020-08-17 NOTE — H&P
[facility]    History and Physical  Hospitalist       Date of Admission:  8/17/2020  Date of Service (when I saw the patient): 08/17/20    ASSESSMENT  Parmjit Hernández is a very pleasant 65 year old gentleman with extensive past medical history that is most notable for systemic amyloidosis, chronic Bipolar disorder, chronic opioid dependence, Type 2 Diabetes mellitus, hypertension, dyslipidemia, and NORMA; who underwent recent lumbar decompression surgery at OSH and now presents with sudden episodic lower extremity weakness and back pain, and found to have myonecrosis.    PLAN    1) Weakness and myonecrosis: He has no known history of CAD. Reportedly he underwent cardiac catheterization at OSH in 2018 that did not show obstructive CAD. He has chronic mild myonecrosis. He was hospitalized here 4/2020 for opiate wtthdrawal after a pain pump had run out and during that time he was found to have anterolateral T wave changes: ongoing mild myonecrosis was noted without concerning rate of rise. Cardiology was consulted and TTE showed preserved LVEF. He was discharged and saw Dr. Montague of [facility] in a virtual visit follow up 6/2020 and at that visit a cardiac stress MRI was discussed for further evaluation; not done due to patient preference. He underwent L4-5 lumbar spine decompression at OSH on 8/6/2020. Now he presents for transient left leg weakness and back pain today, and also an episode of recurrent chest pain several days ago; and he is found to have Troponin minimally elevated once again. He has a number of chronic medical conditions that could lead to chronic Troponin leak. We will rule out ACS. Last, we note that he has systemic Amyloidosis, and an infiltrative cardiomyopathy (in the setting of increased Creatinine, recently discovered signs of hepatic dysfunction, and ongoing intermittent weakness and fatigue) seems possible, though there appear no signs of that on his TTE tests. His weakness  earlier today may have been due to dehydration as well as muscle spasm after recent lumbar spine surgery.    -- Observation. Diet without caffeine. Telemetry, serial enzymes. Cardiology consulted for further evaluation.     2) KWABENA: Of note, he was actually hospitalized three times in a week here 4/2020, at first for management of opiate withdrawal, then for hypotension and KWABENA thought due to unintentional Lisinopril overdose (Cr peaked at 2.09), and then for diaphoresis and nausea prompting cardiac evaluation as above. He says his Lisinopril was held for a time afterward and Cr trended downward, last checked 7/27 and was 1.05. He recently started taking Lisinopril again as directed. Today Cr is 1.60. By history this could be due to hypovolemia.     -- Check orthostatics; cautious IV  ml/hour for one liter and then repeat BMP. Hold Lisinopril again for now.    3) Systemic Amyloidosis: he was diagnosed with [place] Amyloidosis in 2016. He received chemotherapy with Cytoxan, Velcade and dexamethasone followed by autologous stem cell transplant in 12/2016 at [facility]; he is followed by a provider there on a yearly basis and says he has a follow up scheduled next month. His last visit was 7/2019.    4) Possible cirrhosis thought due to MEJIAS: With concomitant splenomegaly, it seems first noted on imaging 4/2020. Also with splenic hemangioma noted 4/2020. Close outpatient follow up recommended.     5) Chronic anemia and thrombocytopenia: HGB 10.6 today and was 11.8 on 7/27/2020.  (103 on 7/27).     -- Monitor as needed.    6) Chronic opioid dependence: It seems he has had an intrathecal pump for chronic abdominal pain (?). Noted.    7) Bipolar disorder type 1: Notes also document OCD.     -- Resume Depakote cautiously at discharge, noting that this may not be the best medication for him long term if in fact cirrhosis is confirmed.    8) Type 2 Diabetes mellitus: Most recent A1c 5.5 on 6/2020 . On four oral  medications as an outpatient.    -- ISS insulin while hospitalized. Hold oral anti-diabetic medications. Resume them at discharge.    9) Hypertension, dyslipidemia: Resume Metoprolol, ASA, statin when verified    10) NORMA and RLS: It seems he takes Lyrica and Modafinil; resume when verified    Rule Out COVID-19 infection  This patient was evaluated during a global COVID-19 pandemic, which necessitated consideration that the patient might be at risk for infection with the SARS-CoV-2 virus that causes COVID-19. Applicable protocols for evaluation were followed during the patient's care. LOW suspicion for infection.   -follow-up COVID-19 PCR test result; no current indication for precautions    Chief Complaint   Leg weakness    History is obtained from the patient and the ED physician whom I have spoken with    History of Present Illness   Parmjit Hernández is a very pleasant 65 year old gentleman who presents with acute onset weakness in the left lower extremity that started this afternoon after he had been standing at an event in the hot sun for over an hour; it caused him to fall backward onto his buttock though he did not land hard. He denies feeling light headedness, dizziness, or other such concomitant symptoms. He went home and there rested for a while, noting that he felt weak generally and fatigued. He adds that he really hasn't eaten anything today. Then tonight he felt a sudden spasm of pain across the middle of his back that was sharp and severe and lasted for an hour before resolving, leading him to come in. This pain is gone now and denies any weakness in the legs now. He also notes that 4 days ago he had an episode of substernal chest pain that led him to call 911; eventually through his PCP he got a Lexiscan scheduled for this week. He has not had further chest pain since then and he denies  Dyspnea or cough, fever, worsening of chronic leg swelling, or any syncope or near syncope, or nausea or diarrhea, or  "any other acute complaints.    In the ED, Blood pressure 119/69, pulse 77, temperature 98.6  F (37  C), temperature source Oral, resp. rate 20, height 1.702 m (5' 7\"), weight 117 kg (258 lb), SpO2 94 %.    CBC and CMP were done and notable for HGB 10.6, , Cl 111, BUN 38, Cr 1.60, Ca 7.6, alb 3.2, tprot 5.9, otherwise were within the normal reference range. , Troponin 0.058. EKG showed NSR with diffuse TWI, no ST segment elevations. D Dimer was 0.7.    CTPA showed:  \"IMPRESSION:  1.  No pulmonary embolism or thoracic aortic dissection.  2.  Mediastinal and axillary adenopathy unchanged.  3.  Stable right lower lobe pulmonary nodule.  4.  Splenomegaly.\"    He was given ASA.    PHYSICAL EXAM  Blood pressure 119/69, pulse 77, temperature 98.6  F (37  C), temperature source Oral, resp. rate 20, height 1.702 m (5' 7\"), weight 117 kg (258 lb), SpO2 94 %.  Constitutional: Alert and oriented to person, place and time; no apparent distress; morbid obesity  HEENT: normocephalic moist mucus membranes  Respiratory: lungs clear to auscultation bilaterally  Cardiovascular: regular S1 S2 no murmurs rubs or gallops  GI: abdomen soft non tender non distended bowel sounds positive  Lymph/Hematologic: no pallor, no cervical lymphadenopathy  Skin: no rash, good turgor  Musculoskeletal: mild to moderate bilateral LE edema  Neurologic: extra-ocular muscles intact; moves all four extremities  Psychiatric: appropriate affect, insight and judgment     DVT Prophylaxis: Pneumatic Compression Devices  Code Status: Full Code    Disposition: Expected discharge in 0-2 days    Parmjit Dong MD    Past Medical History    I have reviewed this patient's medical history and updated it with pertinent information if needed.   Past Medical History:   Diagnosis Date     Acquired lymphedema 2/4/2019     Allergic state      Amyloidosis (H)     followed by hematology Dr. Romeo status post peripheral stem cell transplant     Anxiety " 5/11/2016     Anxiety and depression 12/18/2013     Bipolar I disorder (H) 9/1/2015    Under care of psychiatrist [facility]- Dr Rahman doxepin 25 mg, 2 cap bedtime DULoxetine 20 mg once daily  OLANZapine 7.5 mg  1 tab bedtime      Diabetes mellitus (H)     type 2     EKG abnormality 4/20/2020     H/O bone marrow transplant (H)      Headache(784.0)      History of diverticulitis 1/27/2015    1/15-rxed with antibiotics      Hyperlipidemia LDL goal <100 10/31/2010     Hypertension 2007     Hypertension goal BP (blood pressure) < 140/90 10/11/2011     Marianne (H) 8/20/2015     Morbid obesity (H) 8/28/2018     Myalgia and myositis, unspecified      Narcotic dependence (H) 9/6/2016    None in about 6 months- 8/1/17     NSTEMI (non-ST elevated myocardial infarction) (H) 04/19/2020     Obsessive-compulsive disorders      Obstructive sleep apnea 7/16/2008     OCD (obsessive compulsive disorder)      Other acquired absence of organ 94     Other cirrhosis of liver (H) possibly from vences  4/15/2020     Other specified viral warts      Pain in the abdomen      Peripheral edema 5/20/2018    Noncardiac Continue with  furosemide (LASIX) 20 MG tablet,  potassium       chloride SA (K-DUR/KLOR-CON M) 10 MEQ CR  Tab once daily  Basic metabolic panel     Polyneuropathy associated with underlying disease (H) 2/4/2019     Pure hypercholesterolemia      Renal failure 5/10/2019     Restless legs syndrome (RLS) 6/29/2017     Sleep apnea      Stasis dermatitis of both legs 12/31/2018     Type 2 diabetes mellitus with other specified complication (H) 7/10/2017       Past Surgical History   I have reviewed this patient's surgical history and updated it with pertinent information if needed.  Past Surgical History:   Procedure Laterality Date     BMT PROTOCOL      for systemic amyloidosis     BONE MARROW BIOPSY, BONE SPECIMEN, NEEDLE/TROCAR N/A 6/8/2016    Procedure: BIOPSY BONE MARROW;  Surgeon: Nathan Agrawal MD;  Location: [facility]     BONE  MARROW BIOPSY, BONE SPECIMEN, NEEDLE/TROCAR N/A 2/20/2019    Procedure: BIOPSY BONE MARROW;  Surgeon: Michael Raygoza MD;  Location:  GI     C NONSPECIFIC PROCEDURE  94    Cholecystectomy     C NONSPECIFIC PROCEDURE  2000    repair deviated septum     CHOLECYSTECTOMY  1995    lap qian     COLONOSCOPY  2012    hx polyps     ESOPHAGOSCOPY, GASTROSCOPY, DUODENOSCOPY (EGD), COMBINED N/A 5/29/2015    Procedure: COMBINED ESOPHAGOSCOPY, GASTROSCOPY, DUODENOSCOPY (EGD), BIOPSY SINGLE OR MULTIPLE;  Surgeon: John Jacob MD;  Location:  GI     HERNIA REPAIR, UMBILICAL  2006       Prior to Admission Medications   Prior to Admission Medications   Prescriptions Last Dose Informant Patient Reported? Taking?   ASPIRIN LOW DOSE 81 MG EC tablet   Yes No   Sig: TK 1 T PO QD   Multiple Vitamins-Minerals (SENIOR MULTIVITAMIN PLUS PO)  Self Yes No   Sig: Take 1 tablet by mouth daily   NONFORMULARY  Self Yes No   Sig: by Intrathecal route continuous - + up to 4 boluses daily    Managed by Dr Pawan Shaw, [facility]     Medications in Pump:  fentanyl 2000mcg/mL  Bupivacaine 20mg/mL  Morphine 5.8mg/mL     Rate:   Fentanyl 900mcg/day  Bupivacaine 9mg/day  Morphine 2.6mg/day  Pump Last Fill Date:  4/16/2020   SUMAtriptan (IMITREX) 50 MG tablet   No No   Sig: Take 1 tablet (50 mg) by mouth at onset of headache for migraine May repeat in 2 hours. Max 4 tablets/24 hours.   Turmeric 500 MG CAPS   Yes No   Sig: Take 1 capsule by mouth daily   alpha-lipoic acid 100 MG capsule   Yes No   Sig: Take 200 mg by mouth daily   aspirin (ASPIRIN LOW DOSE) 81 MG tablet  Self No No   Sig: Take 1 tablet (81 mg) by mouth daily   atorvastatin (LIPITOR) 20 MG tablet   No No   Sig: Take 1 tablet (20 mg) by mouth daily   blood glucose (ACCU-CHEK COMPACT DRUM) test strip  Self No No   Sig: Use to test blood sugars 3 to 4 times daily.   blood glucose monitoring (ACCU-CHEK COMPACT CARE KIT) meter device kit  Self No No   Sig: Use to  test blood sugars 3 to 4  times daily.   blood glucose monitoring (SOFTCLIX) lancets  Self Yes No   Sig: USE TO TEST BLOOD SUGAR 3-4 TIMES DAILY OR AS DIRECTED.   caffeine (NO-DOZE) 200 MG TABS tablet   Yes No   Sig: Take 400 mg by mouth daily   clobetasol (TEMOVATE) 0.05 % external ointment   Yes No   Sig: Apply topically 2 times daily   divalproex sodium delayed-release (DEPAKOTE) 500 MG DR tablet   Yes No   Sig: Take 2 tablets (1,000 mg) by mouth daily   furosemide (LASIX) 20 MG tablet   No No   Sig: Take 1 tablet (20 mg) by mouth daily   glimepiride (AMARYL) 1 MG tablet   No No   Sig: Take 1 tablet (1 mg) by mouth every morning (before breakfast)   lisinopril (ZESTRIL) 20 MG tablet   No No   Sig: Take 1.5 tablets (30 mg) by mouth daily   lurasidone (LATUDA) 60 MG TABS tablet   No No   Sig: Take 1 tablet (60 mg) by mouth At Bedtime   metFORMIN (GLUCOPHAGE-XR) 500 MG 24 hr tablet   No No   Sig: Take 1000mg in AM and 1000mg in PM   metoprolol succinate ER (TOPROL-XL) 50 MG 24 hr tablet   No No   Sig: TAKE 1 TABLET(50 MG) BY MOUTH DAILY   modafinil (PROVIGIL) 200 MG tablet   Yes No   Sig: daily    omega-3 acid ethyl esters (LOVAZA) 1 g capsule   No No   Sig: Take 1 capsule (1 g) by mouth daily   pregabalin (LYRICA) 100 MG capsule   No No   Sig: Take 1 capsule (100 mg) by mouth 3 times daily   senna-docusate (SENOKOT-S/PERICOLACE) 8.6-50 MG tablet   Yes No   Sig: Take 2 tablets by mouth 2 times daily as needed for constipation Take 1 tablet in the morning and 2 tablets in the evening.   sitagliptin (JANUVIA) 100 MG tablet   No No   Sig: Take 1 tablet (100 mg) by mouth daily   tamsulosin (FLOMAX) 0.4 MG capsule   No No   Sig: Take 1 capsule (0.4 mg) by mouth 2 times daily   vitamin C (ASCORBIC ACID) 1000 MG TABS  Self Yes No   Sig: Take 1,000 mg by mouth daily      Facility-Administered Medications: None     Allergies   Allergies   Allergen Reactions     Cephalexin Diarrhea     Liraglutide Other (See Comments)      Sulfa Drugs Swelling     Pt has taken  Taken sulfa drugs orally without trouble. He had problems with Sulfa eye drops. Eye swelled up     Victoza      Increased migraine frequency and severity       Social History   I have reviewed this patient's social history and updated it with pertinent information if needed. Parmjit Hernández  reports that he has never smoked. He has never used smokeless tobacco. He reports that he does not drink alcohol or use drugs.    Family History   Family history assessed and, except as above, is non-contributory.    Family History   Problem Relation Age of Onset     Cerebrovascular Disease Mother      C.A.D. Mother      Hypertension Mother      Alcohol/Drug Mother      Arthritis Mother      Cerebrovascular Disease Maternal Grandmother      C.A.D. Maternal Grandmother      Alcohol/Drug Maternal Grandmother      C.A.D. Father      Heart Disease Father      Hypertension Father      Alcohol/Drug Father      Allergies Father      Circulatory Father      Depression Father      Respiratory Father      Asthma Father      Alcohol/Drug Paternal Grandfather      Cerebrovascular Disease Paternal Grandfather      Depression Son      Psychotic Disorder Son         anxiety disorder     Depression Daughter      Cerebrovascular Disease Maternal Grandfather      Cerebrovascular Disease Paternal Grandmother      Diabetes Brother      Allergies Sister      Depression Sister      Gynecology Sister        Review of Systems   The 10 point Review of Systems is negative other than noted in the HPI or here.     Primary Care Physician   Vince Henry    Data   Labs Ordered and Resulted from Time of ED Arrival Up to the Time of Departure from the ED   CBC WITH PLATELETS DIFFERENTIAL - Abnormal; Notable for the following components:       Result Value    RBC Count 3.30 (*)     Hemoglobin 10.6 (*)     Hematocrit 31.3 (*)     Platelet Count 133 (*)     Absolute Lymphocytes 0.7 (*)     All other components within  normal limits   D DIMER QUANTITATIVE - Abnormal; Notable for the following components:    D Dimer 0.7 (*)     All other components within normal limits   COMPREHENSIVE METABOLIC PANEL - Abnormal; Notable for the following components:    Chloride 111 (*)     Glucose 151 (*)     Urea Nitrogen 38 (*)     Creatinine 1.60 (*)     GFR Estimate 45 (*)     GFR Estimate If Black 52 (*)     Calcium 7.6 (*)     Albumin 3.2 (*)     Protein Total 5.9 (*)     All other components within normal limits   TROPONIN I - Abnormal; Notable for the following components:    Troponin I ES 0.058 (*)     All other components within normal limits   NT PROBNP INPATIENT   COVID-19 VIRUS (CORONAVIRUS) BY PCR   VITAL SIGNS   PULSE OXIMETRY NURSING   CARDIAC CONTINUOUS MONITORING       Data reviewed today:  I personally reviewed the EKG tracing showing NSR with diffuse TWI, no ST segment elevations.    Recent Results (from the past 24 hour(s))   CT Chest Pulmonary Embolism w Contrast    Narrative    EXAM: CT CHEST PULMONARY EMBOLISM W CONTRAST  LOCATION: [facility]  DATE/TIME: 8/17/2020 3:02 AM    INDICATION: PE suspected, intermediate prob, positive D-dimer  See the Clinical Information for Interpreting Provider  COMPARISON: 04/06/2018  TECHNIQUE: CT chest pulmonary angiogram during arterial phase injection of IV contrast. Multiplanar reformats and MIP reconstructions were performed. Dose reduction techniques were used.   CONTRAST: 80mL Isovue-370    FINDINGS:  ANGIOGRAM CHEST: Pulmonary arteries are normal caliber and negative for pulmonary emboli. Thoracic aorta is negative for dissection. No CT evidence of right heart strain.    LUNGS AND PLEURA: No infiltrate or pleural effusion. Stable 6 mm right lower lobe nodule.    MEDIASTINUM/AXILLAE: Calcified mediastinal lymph nodes. Stable mediastinal adenopathy.    UPPER ABDOMEN: Cholecystectomy. Prominent nodes at the jaguar hepatis unchanged. Spermatic vein.  Splenomegaly.    MUSCULOSKELETAL: Degenerative change osseous structures. Intrathecal lead. Mild axillary adenopathy similar. Mild loss of height upper thoracic spine.      Impression    IMPRESSION:  1.  No pulmonary embolism or thoracic aortic dissection.  2.  Mediastinal and axillary adenopathy unchanged.  3.  Stable right lower lobe pulmonary nodule.  4.  Splenomegaly.

## 2020-08-17 NOTE — ED TRIAGE NOTES
Pt had back surgery one week ago. He was standing a lot today (more than usual since surgery) and tonight he had left leg weakness and increased backpain.

## 2020-08-17 NOTE — ED PROVIDER NOTES
"  History     Chief Complaint:  Generalized Weakness, Back Pain, and Chest Pain    HPI   Parmjit Hernández is a 65 year old male with a history of type II diabetes, hypertension, hyperlipidemia, NSTEMI, Bipolar I disorder, amongst others, who presents with generalized weakness, back pain, and chest pain via EMS. The patient reports that 1 week ago he had L4-L5 spinal decompression at [facility] and was admitted for 2 nights following due to his comorbidities and his \"drain filling up\". He notes that since then has been feeling well and has been improving; he is no longer taking any pain medications. The patient had a negative COVID-19 test at the time of admission. 4 days ago, the patient had a \"heart alert\" that he describes as mild chest pain that was intermittent and infrequent for 1 hour. He had called a nurses line at the time whom recommended to be seen in the emergency department, but the patient instead made a nuclear stress test appointment for 3 days from now. The pain resolved after 1 hour and has not returned since. He states that yesterday he was then at an outside event when his left leg suddenly gave out and collapsed, causing to slowly fall onto his buttock. He felt generally weak afterwards and was driven home where he felt some improvement for a few hours but states that he was \"just not right\". As the day/night continued, he felt increasing generalized weakness and was needing to use a walking stick to ambulate around his apartment. The patient reports that 1.5 hours ago he felt a \"band of tension\" across his upper back and shoulder blades with diaphoresis which lasted 1 hour before resolving. He has never had this type of pain prior and due to the weakness, back pain, and episode of chest pain a few days prior he became concerned prompting his 911 call. He currently feels improved and no longer has pain and is not experiencing any chest pain currently. He endorses leg swelling recently " which he is using compression stockings to treat. The patient denies nausea, vomiting, calf pain, dizziness, or concern for COVID-19. He does not believe he has had recent COVID-19 exposures. The patient took a baby aspirin yesterday morning.     Cardiac/PE/DVT Risk Factors:  History of hypertension - Positive   History of hyperlipidemia - Positive   History of diabetes - Positive   History of smoking - Negative   Personal history of PE/DVT - Negative   Family history of PE/DVT - Negative   Family history of heart complications - Positive   Recent travel - Negative   Recent surgery - Positive   Other immobilizations - Negative   Cancer - Negative      Allergies:  Cephalexin  Liraglutide  Sulfa Drugs  Victoza      Medications:    alpha-lipoic acid   aspirin  atorvastatin   Clobetasol 0.05 % external ointment  Depakote   Lasix   glimepiride  lisinopril  Latuda   Metformin   metoprolol succinate ER   modafinil   Lyrica   senna-docusate  Januvia   Imitrex   Flomax     Past Medical History:    Acquired lymphedema   Allergic state   KWABENA   Amyloidosis    Anxiety and depression   Bipolar I disorder    Diarrhea   Chronic abdominal pain  diverticulitis   Hyperlipidemia  Hypertension    Marianne    Migraines   Morbid obesity  Myalgia and myositis  Insomnia   Narcotic dependence   NSTEMI  Obsessive-compulsive disorders   Obstructive sleep apnea   absence of organ   cirrhosis of liver   viral warts   Peripheral edema   Polyneuropathy   Pure hypercholesterolemia   Renal failure   Venous ulcer   Restless legs syndrome   Sleep apnea   Stasis dermatitis of both legs   Type 2 diabetes mellitus   Thrombocytopenia    Past Surgical History:    Bone marrow transplant   BMT protocol for systemic amyloidosis   Bone marrow biopsy   choleystectomy  Septoplasty   Colonoscopy   EGD  Hernia repair     Family History:    Mother: Cerebrovascular Disease, Coronary Artery Disease, hypertension, alcohol/drug, arthritis   Maternal grandmother:  "Cerebrovascular Disease, Coronary Artery Disease, alcohol/drug  Maternal grandfather: Cerebrovascular Disease  Paternal grandmother: Cerebrovascular Disease    Father: Coronary Artery Disease, hypertension, alcohol/drug, allergies, circulatory, depression, asthma   Paternal grandfather: alcohol/drug, Cerebrovascular Disease  Son: depression, anxiety   Daughter: depression  Brother: diabetes  Sister: depression, allergies, GYN     Social History:  The patient was accompanied to the ED by EMS.  Smoking Status: Never Smoker  Smokeless Tobacco: Never Used  Alcohol Use: Negative   Drug Use: Negative  PCP: Vince Henry   Marital Status:        Review of Systems   Constitutional: Positive for diaphoresis.   Cardiovascular: Positive for chest pain and leg swelling.   Gastrointestinal: Negative for nausea and vomiting.   Musculoskeletal: Positive for back pain (band of tension across upper back and shoulder blades).        No calf pain   Neurological: Positive for weakness. Negative for dizziness.   All other systems reviewed and are negative.    Physical Exam     Patient Vitals for the past 24 hrs:   BP Temp Temp src Pulse Resp SpO2 Height Weight   08/17/20 0337 -- -- -- -- -- 94 % -- --   08/17/20 0336 119/69 -- -- 77 -- -- -- --   08/17/20 0220 (!) 116/107 -- -- -- -- -- -- --   08/17/20 0200 -- -- -- 86 -- -- -- --   08/17/20 0150 105/68 -- -- -- -- -- -- --   08/17/20 0130 105/62 -- -- 79 -- 97 % -- --   08/17/20 0127 116/74 98.6  F (37  C) Oral 81 20 97 % 1.702 m (5' 7\") 117 kg (258 lb)      Physical Exam  General: Well-nourished, appears to be resting comfortably when I enter the room  Eyes: PERRL, conjunctivae pink no scleral icterus or conjunctival injection  ENT:  Moist mucus membranes, posterior oropharynx clear without erythema or exudates  Respiratory:  Lungs clear to auscultation bilaterally, no crackles/rubs/wheezes.  Good air movement  CV: Normal rate and rhythm, no murmurs/rubs/gallops  GI:  " Abdomen soft and non-distended.  Normoactive BS.  No tenderness, guarding or rebound  Skin: Warm, dry.  No rashes or petechiae. Lumbar spine incicision C/D/I  Musculoskeletal: No peripheral edema or calf tenderness  Neuro: Alert and oriented to person/place/time. Normal saddle sensation.  Normal and symmetric reflexes at patella tendon bilaterally.  Normal and symmetric strength at BLE.  Psychiatric: Normal affect    Emergency Department Course     ECG:  ECG taken at 0147, ECG read at 0152  Sinus rhythm with 1st degree AV block   Left axis deviation   Inferior infarct, age undetermined   Possible anterolateral infarct, age undetermined   Abnormal ECG  No significant change compared to EKG dated 4/19/2020  Rate 76 bpm. VT interval 218 ms. QRS duration 64 ms. QT/QTc 390/438 ms. P-R-T axes 60 -40 -5.     Imaging:  Radiology findings were communicated with the patient who voiced understanding of the findings.    CT Chest Pulmonary Embolism w Contrast  1.  No pulmonary embolism or thoracic aortic dissection.  2.  Mediastinal and axillary adenopathy unchanged.  3.  Stable right lower lobe pulmonary nodule.  4.  Splenomegaly.  Reading per radiology      Laboratory:  Laboratory findings were communicated with the patient who voiced understanding of the findings.    Asymptomatic COVID-19 Virus (Coronavirus) by PCR Nasopharyngeal swab: Pending     CBC: WBC 4.9, HGB 10.6 (L),  (L)  CMP: Chloride 111 (H), Glucose 151 (H), BUN 38 (H), GFR 45 (L), Calcium 7.6 (L), Albumin 3.2 (L), Protein Total 5.9 (L), o/w WNL (Creatinine 1.60 (H))    Troponin (Collected 0132): 0.058 (H)     D Dimer: 0.7 (H)    Nt probnp inpatient: 206    Interventions:  0141 aspirin 324 mg PO     Emergency Department Course:    0120 Nursing notes and vitals reviewed. I performed an exam of the patient as documented above.     0132 IV was inserted and blood was drawn for laboratory testing, results above.     0147 EKG obtained as noted above.      0215 Patient rechecked and updated.     0302 The patient was sent for a CT while in the emergency department, results above.     0352 I spoke with Dr. Dong of the hospitalist service from [facility] regarding patient's presentation, findings, and plan of care.      A COVID-19 nasal swab PCR test was obtained for laboratory testing as documented above.     Prior to admission, I personally reviewed the results with the patient and all related questions were answered. The patient verbalized understanding and is amenable to plan.     Impression & Plan      Medical Decision Making:  Parmjit Hernández is a 65 year old male who presents to the emergency department today for evaluation of of an episode of chest pain and then subsequently an episode of back tightness and weakness which could be an anginal equivalent.  He has a complicated past medical history and is not low risk for coronary artery disease.  He recently had surgery and so a d-dimer was obtained and shows an elevation.  A CT scan was obtained to rule out pulmonary embolism and shows no evidence of PE or aortic abnormality.  Laboratory studies were obtained and showed a slight elevation of his troponin.  He seems to have had a similar elevation that was flat in the past and so it is unclear if this is secondary to a cardiac event or this is his baseline.  He was treated with an aspirin but heparin was held given that he is discomfort free at this time and he had the recent lumbar surgery.  He did not require any nitroglycerin.  No signs of infection of his surgical site.  No signs of cauda equina.  We will admit him to the observation unit for formal cardiac rule out.  Dr. Fry graciously agreed to admit him.  The patient was in agreement with the plan as well.    Covid-19  Parmjit Hernández was evaluated during a global COVID-19 pandemic, which necessitated consideration that the patient might be at risk for infection with the SARS-CoV-2 virus  that causes COVID-19.   Applicable protocols for evaluation were followed during the patient's care.   COVID-19 was considered as part of the patient's evaluation. The plan for testing is:  a test was obtained at a previous visit and reviewed & was re-obtained today.    Diagnosis:    ICD-10-CM    1. Chest pain, unspecified type  R07.9    2. Generalized muscle weakness  M62.81      Disposition:   The patient is admitted into the care of Dr. Dong.     Scribe Disclosure:  I, Orla Severson, am serving as a scribe at 1:36 AM on 8/17/2020 to document services personally performed by Mimi Mtz MD based on my observations and the provider's statements to me.    EMERGENCY DEPARTMENT       Mimi Mtz MD  08/19/20 [id number]

## 2020-08-17 NOTE — CONSULTS
[facility]    Cardiology Consultation     Date of Admission:  8/17/2020    Assessment & Plan     This is a 65 year old gentleman with extensive past medical history that is most notable for systemic amyloidosis, chronic Bipolar disorder, chronic opioid dependence, Type 2 Diabetes mellitus, hypertension, dyslipidemia, and NORMA; who underwent recent lumbar decompression surgery at OSH admitted with lower extremity weakness and had elevated troponin. Cardiology consulted for management.     EKG findings of low voltage with chronically elevated troponin in setting of systemic amyloid raise suspicious for cardiac amyloid. Transthoracic echocardiogram not sensitive in detection of amyloid and ultimately cardiac MRI, biopsy are more definitive. Discussed in depth with patient who is amenable to plan as long as presedated with ativan. I do not suspect obstructive CAD and NSTEMI as a result given negative coronary angiogram in 2018 at [facility] although cannot completely exclude accelerated CAD.     Recommendations:    1. Cardiac MRI with contrast, stress (will cancel nuclear stress test for this Thursday)  2. Aspirin 81 mg daily   3. Atorvastatin 20 mg daily   4. If CMR and stress test equivocal, would then consider repeat coronary angiogram versus coronary CT angiogram.       Debra Stoddard MD    Code Status    Full Code    Reason for Consult    Troponin elevation, chest pain     Primary Care Physician   *Vince Henry    Chief Complaint     Chest pain     History of Present Illness     65 year old gentleman with extensive past medical history that is most notable for systemic amyloidosis, chronic Bipolar disorder, chronic opioid dependence, Type 2 Diabetes mellitus, hypertension, dyslipidemia, and NORMA; who underwent recent lumbar decompression surgery at OSH admitted with lower extremity weakness and had elevated troponin. Cardiology consulted for management.     Patient has no known history of  CAD and underwent coronary angiogram at OSH that demonstrated no obstructive disease. He has known chronically elevated troponin. He was hospitalized here 4/2020 for opiate withdrawal when pain pump ran out and at that time demonstrated anterolateral T wave changes. Echocardiogram without concerning findings however patient was discharged with cardiologist follow up. He was discharged and saw Dr. Montague of [facility] in a virtual visit follow up 6/2020 and at that visit a cardiac stress MRI was discussed for further evaluation; not done due to patient preference. He was instead scheduled for a nuclear stress test for this Thursday. He presented to hospital with left leg weakness and back pain but also endorsed chest pain several days ago and again had troponin mildly elevated. He reports never having formal cardiac amyloid work up. EKG showed low voltage. Mild LVH on his echocardiogram.     ROS negative for SOB, +LE edema, no orthopnea or PND, no abdominal pain, n/v/d, fevers chills or ns.     Past Medical History   I have reviewed this patient's medical history and updated it with pertinent information if needed.   Past Medical History:   Diagnosis Date     Acquired lymphedema 2/4/2019     Allergic state      Amyloidosis (H)     followed by hematology Dr. Romeo status post peripheral stem cell transplant     Anxiety 5/11/2016     Anxiety and depression 12/18/2013     Bipolar I disorder (H) 9/1/2015    Under care of psychiatrist [facility]- Dr Rahman doxepin 25 mg, 2 cap bedtime DULoxetine 20 mg once daily  OLANZapine 7.5 mg  1 tab bedtime      Diabetes mellitus (H)     type 2     EKG abnormality 4/20/2020     H/O bone marrow transplant (H)      Headache(784.0)      History of diverticulitis 1/27/2015    1/15-rxed with antibiotics      Hyperlipidemia LDL goal <100 10/31/2010     Hypertension 2007     Hypertension goal BP (blood pressure) < 140/90 10/11/2011     Marianne (H) 8/20/2015     Morbid obesity (H) 8/28/2018      Myalgia and myositis, unspecified      Narcotic dependence (H) 9/6/2016    None in about 6 months- 8/1/17     NSTEMI (non-ST elevated myocardial infarction) (H) 04/19/2020     Obsessive-compulsive disorders      Obstructive sleep apnea 7/16/2008     OCD (obsessive compulsive disorder)      Other acquired absence of organ 94     Other cirrhosis of liver (H) possibly from vences  4/15/2020     Other specified viral warts      Pain in the abdomen      Peripheral edema 5/20/2018    Noncardiac Continue with  furosemide (LASIX) 20 MG tablet,  potassium       chloride SA (K-DUR/KLOR-CON M) 10 MEQ CR  Tab once daily  Basic metabolic panel     Polyneuropathy associated with underlying disease (H) 2/4/2019     Pure hypercholesterolemia      Renal failure 5/10/2019     Restless legs syndrome (RLS) 6/29/2017     Sleep apnea      Stasis dermatitis of both legs 12/31/2018     Type 2 diabetes mellitus with other specified complication (H) 7/10/2017       Past Surgical History   I have reviewed this patient's surgical history and updated it with pertinent information if needed.  Past Surgical History:   Procedure Laterality Date     BMT PROTOCOL      for systemic amyloidosis     BONE MARROW BIOPSY, BONE SPECIMEN, NEEDLE/TROCAR N/A 6/8/2016    Procedure: BIOPSY BONE MARROW;  Surgeon: Nathan Agrawal MD;  Location:  GI     BONE MARROW BIOPSY, BONE SPECIMEN, NEEDLE/TROCAR N/A 2/20/2019    Procedure: BIOPSY BONE MARROW;  Surgeon: Michael Raygoza MD;  Location:  GI     C NONSPECIFIC PROCEDURE  94    Cholecystectomy     C NONSPECIFIC PROCEDURE  2000    repair deviated septum     CHOLECYSTECTOMY  1995    lap qian     COLONOSCOPY  2012    hx polyps     ESOPHAGOSCOPY, GASTROSCOPY, DUODENOSCOPY (EGD), COMBINED N/A 5/29/2015    Procedure: COMBINED ESOPHAGOSCOPY, GASTROSCOPY, DUODENOSCOPY (EGD), BIOPSY SINGLE OR MULTIPLE;  Surgeon: John Jacob MD;  Location:  GI     HERNIA REPAIR, UMBILICAL  2006        Prior to Admission Medications   Prior to Admission Medications   Prescriptions Last Dose Informant Patient Reported? Taking?   Multiple Vitamins-Minerals (SENIOR MULTIVITAMIN PLUS PO) 8/16/2020 at 1400 Self Yes Yes   Sig: Take 1 tablet by mouth daily   NONFORMULARY  Self Yes Yes   Sig: by Intrathecal route continuous - + up to 4 boluses daily    Managed by Dr Pawan Shaw [facility]     Medications in Pump:  fentanyl 2000mcg/mL  Bupivacaine 20mg/mL  Morphine 5.8mg/mL     Rate:   Fentanyl 1200mcg/day  Bupivacaine 12mg/day  Morphine 4.2mg/day  Pump Last Fill Date:  4/16/2020   SUMAtriptan (IMITREX) 50 MG tablet prn med  No Yes   Sig: Take 1 tablet (50 mg) by mouth at onset of headache for migraine May repeat in 2 hours. Max 4 tablets/24 hours.   Turmeric 500 MG CAPS 8/16/2020 at 1400  Yes Yes   Sig: Take 1 capsule by mouth daily   acetaminophen (TYLENOL) 500 MG tablet prn med  Yes Yes   Sig: Take 1,000 mg by mouth as needed for mild pain   alpha-lipoic acid 100 MG capsule 8/16/2020 at 1400  Yes Yes   Sig: Take 200 mg by mouth daily   aspirin (ASPIRIN LOW DOSE) 81 MG tablet 8/16/2020 at 1400 Self No Yes   Sig: Take 1 tablet (81 mg) by mouth daily   atorvastatin (LIPITOR) 20 MG tablet 8/16/2020 at 1400  No Yes   Sig: Take 1 tablet (20 mg) by mouth daily   blood glucose (ACCU-CHEK COMPACT DRUM) test strip  Self No No   Sig: Use to test blood sugars 3 to 4 times daily.   blood glucose monitoring (ACCU-CHEK COMPACT CARE KIT) meter device kit  Self No No   Sig: Use to test blood sugars 3 to 4  times daily.   blood glucose monitoring (SOFTCLIX) lancets  Self Yes No   Sig: USE TO TEST BLOOD SUGAR 3-4 TIMES DAILY OR AS DIRECTED.   caffeine (NO-DOZE) 200 MG TABS tablet 8/16/2020 at 1400  Yes Yes   Sig: Take 400 mg by mouth daily   clobetasol (TEMOVATE) 0.05 % external ointment 8/16/2020 at 1400  Yes Yes   Sig: Apply topically daily    divalproex sodium delayed-release (DEPAKOTE) 500 MG DR tablet 8/15/2020 at pm   Yes Yes   Sig: Take 2 tablets (1,000 mg) by mouth daily   furosemide (LASIX) 20 MG tablet 8/16/2020 at 1400  No Yes   Sig: Take 1 tablet (20 mg) by mouth daily   glimepiride (AMARYL) 1 MG tablet 8/16/2020 at 1400  No Yes   Sig: Take 1 tablet (1 mg) by mouth every morning (before breakfast)   ibuprofen (ADVIL/MOTRIN) 200 MG tablet prn med  Yes Yes   Sig: Take 800 mg by mouth every 8 hours as needed for mild pain    lisinopril (ZESTRIL) 20 MG tablet 8/16/2020 at 1400  No Yes   Sig: Take 1.5 tablets (30 mg) by mouth daily   lurasidone (LATUDA) 60 MG TABS tablet 8/15/2020 at pm  No No   Sig: Take 1 tablet (60 mg) by mouth At Bedtime   metFORMIN (GLUCOPHAGE-XR) 500 MG 24 hr tablet 8/16/2020 at x 2  No Yes   Sig: Take 1000mg in AM and 1000mg in PM   metoprolol succinate ER (TOPROL-XL) 50 MG 24 hr tablet 8/15/2020 at pm  No No   Sig: TAKE 1 TABLET(50 MG) BY MOUTH DAILY   modafinil (PROVIGIL) 200 MG tablet 8/16/2020 at 1400  Yes Yes   Sig: daily    omega-3 acid ethyl esters (LOVAZA) 1 g capsule 8/16/2020 at 1400  No Yes   Sig: Take 1 capsule (1 g) by mouth daily   pregabalin (LYRICA) 100 MG capsule 8/16/2020 at x 2  No Yes   Sig: Take 1 capsule (100 mg) by mouth 3 times daily   senna-docusate (SENOKOT-S/PERICOLACE) 8.6-50 MG tablet 8/16/2020 at 1400  Yes Yes   Sig: Take 1 tablet by mouth daily Take 1 tablet in the morning and 2 tablets in the evening.    senna-docusate (SENOKOT-S/PERICOLACE) 8.6-50 MG tablet 8/16/2020 at pm  Yes Yes   Sig: Take 2 tablets by mouth every evening   sitagliptin (JANUVIA) 100 MG tablet 8/16/2020 at 1400  No Yes   Sig: Take 1 tablet (100 mg) by mouth daily   tamsulosin (FLOMAX) 0.4 MG capsule 8/16/2020 at x 2  No Yes   Sig: Take 1 capsule (0.4 mg) by mouth 2 times daily   vitamin C (ASCORBIC ACID) 1000 MG TABS 8/16/2020 at 1400 Self Yes Yes   Sig: Take 1,000 mg by mouth daily      Facility-Administered Medications: None     Allergies   Allergies   Allergen Reactions     Cephalexin Diarrhea      Liraglutide Other (See Comments)     Sulfa Drugs Swelling     Pt has taken  Taken sulfa drugs orally without trouble. He had problems with Sulfa eye drops. Eye swelled up     Victoza      Increased migraine frequency and severity       Social History   I have reviewed this patient's social history and updated it with pertinent information if needed. Parmjit Hernández  reports that he has never smoked. He has never used smokeless tobacco. He reports that he does not drink alcohol or use drugs.    Family History   I have reviewed this patient's family history and updated it with pertinent information if needed.   Family History   Problem Relation Age of Onset     Cerebrovascular Disease Mother      C.A.D. Mother      Hypertension Mother      Alcohol/Drug Mother      Arthritis Mother      Cerebrovascular Disease Maternal Grandmother      C.A.D. Maternal Grandmother      Alcohol/Drug Maternal Grandmother      C.A.D. Father      Heart Disease Father      Hypertension Father      Alcohol/Drug Father      Allergies Father      Circulatory Father      Depression Father      Respiratory Father      Asthma Father      Alcohol/Drug Paternal Grandfather      Cerebrovascular Disease Paternal Grandfather      Depression Son      Psychotic Disorder Son         anxiety disorder     Depression Daughter      Cerebrovascular Disease Maternal Grandfather      Cerebrovascular Disease Paternal Grandmother      Diabetes Brother      Allergies Sister      Depression Sister      Gynecology Sister        Review of Systems   The 10 point Review of Systems is negative other than noted in the HPI or here.     Physical Exam   Temp: 97.7  F (36.5  C) Temp src: Oral BP: 119/73 Pulse: 85 Heart Rate: 82 Resp: 18 SpO2: 97 % O2 Device: None (Room air)    Vital Signs with Ranges  Temp:  [97.7  F (36.5  C)-98.6  F (37  C)] 97.7  F (36.5  C)  Pulse:  [77-97] 85  Heart Rate:  [79-98] 82  Resp:  [10-20] 18  BP: (105-119)/() 119/73  SpO2:  [94 %-98 %] 97  %  264 lbs 4.8 oz    Constitutional: Awake, alert, cooperative, no apparent distress.  Eyes: Conjunctiva and pupils examined and normal.  HEENT: Moist mucous membranes, normal dentition.  Respiratory: Clear to auscultation bilaterally, no crackles or wheezing.  Cardiovascular: Regular rate and rhythm, normal S1 and S2, and no murmur noted.  GI: Soft, non-distended, non-tender, normal bowel sounds.  Lymph/Hematologic: No anterior cervical or supraclavicular adenopathy.  Skin: No rashes, no cyanosis, no edema.  Musculoskeletal: No joint swelling, erythema or tenderness.  Neurologic: Cranial nerves 2-12 intact, normal strength and sensation.  Psychiatric: Alert, oriented to person, place and time, no obvious anxiety or depression.     Data   Results for orders placed or performed during the hospital encounter of 08/17/20 (from the past 24 hour(s))   CBC with platelets differential   Result Value Ref Range    WBC 4.9 4.0 - 11.0 10e9/L    RBC Count 3.30 (L) 4.4 - 5.9 10e12/L    Hemoglobin 10.6 (L) 13.3 - 17.7 g/dL    Hematocrit 31.3 (L) 40.0 - 53.0 %    MCV 95 78 - 100 fl    MCH 32.1 26.5 - 33.0 pg    MCHC 33.9 31.5 - 36.5 g/dL    RDW 13.6 10.0 - 15.0 %    Platelet Count 133 (L) 150 - 450 10e9/L    Diff Method Automated Method     % Neutrophils 73.1 %    % Lymphocytes 13.6 %    % Monocytes 8.0 %    % Eosinophils 4.5 %    % Basophils 0.4 %    % Immature Granulocytes 0.4 %    Absolute Neutrophil 3.6 1.6 - 8.3 10e9/L    Absolute Lymphocytes 0.7 (L) 0.8 - 5.3 10e9/L    Absolute Monocytes 0.4 0.0 - 1.3 10e9/L    Absolute Eosinophils 0.2 0.0 - 0.7 10e9/L    Absolute Basophils 0.0 0.0 - 0.2 10e9/L    Abs Immature Granulocytes 0.0 0 - 0.4 10e9/L   D dimer quantitative   Result Value Ref Range    D Dimer 0.7 (H) 0.0 - 0.50 ug/ml FEU   Comprehensive metabolic panel   Result Value Ref Range    Sodium 140 133 - 144 mmol/L    Potassium 4.3 3.4 - 5.3 mmol/L    Chloride 111 (H) 94 - 109 mmol/L    Carbon Dioxide 23 20 - 32 mmol/L     Anion Gap 6 3 - 14 mmol/L    Glucose 151 (H) 70 - 99 mg/dL    Urea Nitrogen 38 (H) 7 - 30 mg/dL    Creatinine 1.60 (H) 0.66 - 1.25 mg/dL    GFR Estimate 45 (L) >60 mL/min/[1.73_m2]    GFR Estimate If Black 52 (L) >60 mL/min/[1.73_m2]    Calcium 7.6 (L) 8.5 - 10.1 mg/dL    Bilirubin Total 0.3 0.2 - 1.3 mg/dL    Albumin 3.2 (L) 3.4 - 5.0 g/dL    Protein Total 5.9 (L) 6.8 - 8.8 g/dL    Alkaline Phosphatase 72 40 - 150 U/L    ALT 35 0 - 70 U/L    AST 16 0 - 45 U/L   Nt probnp inpatient (BNP)   Result Value Ref Range    N-Terminal Pro BNP Inpatient 206 0 - 900 pg/mL   Troponin I   Result Value Ref Range    Troponin I ES 0.058 (H) 0.000 - 0.045 ug/L   EKG 12-lead, tracing only   Result Value Ref Range    Interpretation ECG Click View Image link to view waveform and result    CT Chest Pulmonary Embolism w Contrast    Narrative    EXAM: CT CHEST PULMONARY EMBOLISM W CONTRAST  LOCATION: [facility]  DATE/TIME: 8/17/2020 3:02 AM    INDICATION: PE suspected, intermediate prob, positive D-dimer  See the Clinical Information for Interpreting Provider  COMPARISON: 04/06/2018  TECHNIQUE: CT chest pulmonary angiogram during arterial phase injection of IV contrast. Multiplanar reformats and MIP reconstructions were performed. Dose reduction techniques were used.   CONTRAST: 80mL Isovue-370    FINDINGS:  ANGIOGRAM CHEST: Pulmonary arteries are normal caliber and negative for pulmonary emboli. Thoracic aorta is negative for dissection. No CT evidence of right heart strain.    LUNGS AND PLEURA: No infiltrate or pleural effusion. Stable 6 mm right lower lobe nodule.    MEDIASTINUM/AXILLAE: Calcified mediastinal lymph nodes. Stable mediastinal adenopathy.    UPPER ABDOMEN: Cholecystectomy. Prominent nodes at the jaguar hepatis unchanged. Spermatic vein. Splenomegaly.    MUSCULOSKELETAL: Degenerative change osseous structures. Intrathecal lead. Mild axillary adenopathy similar. Mild loss of height upper thoracic spine.       Impression    IMPRESSION:  1.  No pulmonary embolism or thoracic aortic dissection.  2.  Mediastinal and axillary adenopathy unchanged.  3.  Stable right lower lobe pulmonary nodule.  4.  Splenomegaly.   Glucose by meter   Result Value Ref Range    Glucose 108 (H) 70 - 99 mg/dL   Troponin I - Now then in 4 hours x 2   Result Value Ref Range    Troponin I ES 0.061 (H) 0.000 - 0.045 ug/L   Basic metabolic panel   Result Value Ref Range    Sodium 141 133 - 144 mmol/L    Potassium 4.5 3.4 - 5.3 mmol/L    Chloride 113 (H) 94 - 109 mmol/L    Carbon Dioxide 23 20 - 32 mmol/L    Anion Gap 5 3 - 14 mmol/L    Glucose 119 (H) 70 - 99 mg/dL    Urea Nitrogen 37 (H) 7 - 30 mg/dL    Creatinine 1.48 (H) 0.66 - 1.25 mg/dL    GFR Estimate 49 (L) >60 mL/min/[1.73_m2]    GFR Estimate If Black 57 (L) >60 mL/min/[1.73_m2]    Calcium 8.1 (L) 8.5 - 10.1 mg/dL   Glucose by meter   Result Value Ref Range    Glucose 112 (H) 70 - 99 mg/dL   Troponin I - Now then in 4 hours x 2   Result Value Ref Range    Troponin I ES 0.061 (H) 0.000 - 0.045 ug/L     *Note: Due to a large number of results and/or encounters for the requested time period, some results have not been displayed. A complete set of results can be found in Results Review.

## 2020-08-17 NOTE — ED NOTES
Bed: ED04  Expected date:   Expected time:   Means of arrival:   Comments:  722  65M Back/Chest pain  0008

## 2020-08-18 ENCOUNTER — APPOINTMENT (OUTPATIENT)
Dept: CARDIOLOGY | Facility: CLINIC | Age: 65
End: 2020-08-18
Attending: INTERNAL MEDICINE
Payer: COMMERCIAL

## 2020-08-18 VITALS
BODY MASS INDEX: 41.34 KG/M2 | HEIGHT: 67 IN | HEART RATE: 69 BPM | TEMPERATURE: 98 F | SYSTOLIC BLOOD PRESSURE: 114 MMHG | WEIGHT: 263.4 LBS | DIASTOLIC BLOOD PRESSURE: 68 MMHG | RESPIRATION RATE: 18 BRPM | OXYGEN SATURATION: 96 %

## 2020-08-18 LAB
ANION GAP SERPL CALCULATED.3IONS-SCNC: <1 MMOL/L (ref 3–14)
BUN SERPL-MCNC: 21 MG/DL (ref 7–30)
CALCIUM SERPL-MCNC: 8.2 MG/DL (ref 8.5–10.1)
CHLORIDE SERPL-SCNC: 114 MMOL/L (ref 94–109)
CO2 SERPL-SCNC: 28 MMOL/L (ref 20–32)
CREAT SERPL-MCNC: 0.95 MG/DL (ref 0.66–1.25)
GFR SERPL CREATININE-BSD FRML MDRD: 84 ML/MIN/{1.73_M2}
GLUCOSE BLDC GLUCOMTR-MCNC: 110 MG/DL (ref 70–99)
GLUCOSE BLDC GLUCOMTR-MCNC: 113 MG/DL (ref 70–99)
GLUCOSE BLDC GLUCOMTR-MCNC: 88 MG/DL (ref 70–99)
GLUCOSE BLDC GLUCOMTR-MCNC: 98 MG/DL (ref 70–99)
GLUCOSE SERPL-MCNC: 99 MG/DL (ref 70–99)
POTASSIUM SERPL-SCNC: 4.6 MMOL/L (ref 3.4–5.3)
SARS-COV-2 RNA SPEC QL NAA+PROBE: NOT DETECTED
SODIUM SERPL-SCNC: 142 MMOL/L (ref 133–144)
SPECIMEN SOURCE: NORMAL

## 2020-08-18 PROCEDURE — 75563 CARD MRI W/STRESS IMG & DYE: CPT | Mod: 26 | Performed by: INTERNAL MEDICINE

## 2020-08-18 PROCEDURE — 25000128 H RX IP 250 OP 636: Performed by: HOSPITALIST

## 2020-08-18 PROCEDURE — A9585 GADOBUTROL INJECTION: HCPCS | Performed by: HOSPITALIST

## 2020-08-18 PROCEDURE — 36415 COLL VENOUS BLD VENIPUNCTURE: CPT | Performed by: HOSPITALIST

## 2020-08-18 PROCEDURE — 25000128 H RX IP 250 OP 636: Performed by: INTERNAL MEDICINE

## 2020-08-18 PROCEDURE — 93018 CV STRESS TEST I&R ONLY: CPT | Performed by: INTERNAL MEDICINE

## 2020-08-18 PROCEDURE — 00000146 ZZHCL STATISTIC GLUCOSE BY METER IP

## 2020-08-18 PROCEDURE — 75563 CARD MRI W/STRESS IMG & DYE: CPT

## 2020-08-18 PROCEDURE — 99217 ZZC OBSERVATION CARE DISCHARGE: CPT | Performed by: HOSPITALIST

## 2020-08-18 PROCEDURE — 25000132 ZZH RX MED GY IP 250 OP 250 PS 637: Performed by: HOSPITALIST

## 2020-08-18 PROCEDURE — 25500064 ZZH RX 255 OP 636: Performed by: HOSPITALIST

## 2020-08-18 PROCEDURE — 93016 CV STRESS TEST SUPVJ ONLY: CPT | Performed by: INTERNAL MEDICINE

## 2020-08-18 PROCEDURE — 80048 BASIC METABOLIC PNL TOTAL CA: CPT | Performed by: HOSPITALIST

## 2020-08-18 PROCEDURE — 96376 TX/PRO/DX INJ SAME DRUG ADON: CPT | Mod: 59

## 2020-08-18 PROCEDURE — 99214 OFFICE O/P EST MOD 30 MIN: CPT | Mod: 25 | Performed by: INTERNAL MEDICINE

## 2020-08-18 PROCEDURE — G0378 HOSPITAL OBSERVATION PER HR: HCPCS

## 2020-08-18 RX ORDER — AMINOPHYLLINE 25 MG/ML
100 INJECTION, SOLUTION INTRAVENOUS ONCE
Status: DISCONTINUED | OUTPATIENT
Start: 2020-08-18 | End: 2020-08-18 | Stop reason: HOSPADM

## 2020-08-18 RX ORDER — DIPHENHYDRAMINE HYDROCHLORIDE 50 MG/ML
25-50 INJECTION INTRAMUSCULAR; INTRAVENOUS
Status: DISCONTINUED | OUTPATIENT
Start: 2020-08-18 | End: 2020-08-18

## 2020-08-18 RX ORDER — REGADENOSON 0.08 MG/ML
0.4 INJECTION, SOLUTION INTRAVENOUS ONCE
Status: COMPLETED | OUTPATIENT
Start: 2020-08-18 | End: 2020-08-18

## 2020-08-18 RX ORDER — DIPHENHYDRAMINE HCL 25 MG
25 CAPSULE ORAL
Status: DISCONTINUED | OUTPATIENT
Start: 2020-08-18 | End: 2020-08-18

## 2020-08-18 RX ORDER — ALBUTEROL SULFATE 90 UG/1
2 AEROSOL, METERED RESPIRATORY (INHALATION) EVERY 5 MIN PRN
Status: DISCONTINUED | OUTPATIENT
Start: 2020-08-18 | End: 2020-08-18 | Stop reason: HOSPADM

## 2020-08-18 RX ORDER — CARVEDILOL 6.25 MG/1
6.25 TABLET ORAL 2 TIMES DAILY WITH MEALS
Status: DISCONTINUED | OUTPATIENT
Start: 2020-08-18 | End: 2020-08-18 | Stop reason: HOSPADM

## 2020-08-18 RX ORDER — GADOBUTROL 604.72 MG/ML
30 INJECTION INTRAVENOUS ONCE
Status: COMPLETED | OUTPATIENT
Start: 2020-08-18 | End: 2020-08-18

## 2020-08-18 RX ORDER — METHYLPREDNISOLONE SODIUM SUCCINATE 125 MG/2ML
125 INJECTION, POWDER, LYOPHILIZED, FOR SOLUTION INTRAMUSCULAR; INTRAVENOUS
Status: DISCONTINUED | OUTPATIENT
Start: 2020-08-18 | End: 2020-08-18

## 2020-08-18 RX ORDER — ONDANSETRON 2 MG/ML
4 INJECTION INTRAMUSCULAR; INTRAVENOUS
Status: DISCONTINUED | OUTPATIENT
Start: 2020-08-18 | End: 2020-08-18

## 2020-08-18 RX ORDER — ACETAMINOPHEN 325 MG/1
650 TABLET ORAL EVERY 4 HOURS PRN
DISCHARGE
Start: 2020-08-18 | End: 2021-03-05

## 2020-08-18 RX ORDER — DIAZEPAM 5 MG
5 TABLET ORAL EVERY 30 MIN PRN
Status: DISCONTINUED | OUTPATIENT
Start: 2020-08-18 | End: 2020-08-18

## 2020-08-18 RX ORDER — ACYCLOVIR 200 MG/1
0-1 CAPSULE ORAL
Status: DISCONTINUED | OUTPATIENT
Start: 2020-08-18 | End: 2020-08-18

## 2020-08-18 RX ADMIN — ASPIRIN 81 MG: 81 TABLET, COATED ORAL at 08:31

## 2020-08-18 RX ADMIN — PREGABALIN 100 MG: 100 CAPSULE ORAL at 08:30

## 2020-08-18 RX ADMIN — TAMSULOSIN HYDROCHLORIDE 0.4 MG: 0.4 CAPSULE ORAL at 08:30

## 2020-08-18 RX ADMIN — ATORVASTATIN CALCIUM 20 MG: 20 TABLET, FILM COATED ORAL at 08:31

## 2020-08-18 RX ADMIN — PREGABALIN 100 MG: 100 CAPSULE ORAL at 17:53

## 2020-08-18 RX ADMIN — GADOBUTROL 30 ML: 604.72 INJECTION INTRAVENOUS at 12:41

## 2020-08-18 RX ADMIN — MODAFINIL 200 MG: 200 TABLET ORAL at 08:30

## 2020-08-18 RX ADMIN — REGADENOSON 0.4 MG: 0.08 INJECTION, SOLUTION INTRAVENOUS at 11:01

## 2020-08-18 RX ADMIN — ACETAMINOPHEN 500 MG: 500 TABLET, FILM COATED ORAL at 13:22

## 2020-08-18 RX ADMIN — ACETAMINOPHEN 500 MG: 500 TABLET, FILM COATED ORAL at 08:32

## 2020-08-18 RX ADMIN — OXYCODONE HYDROCHLORIDE 5 MG: 5 TABLET ORAL at 13:33

## 2020-08-18 RX ADMIN — SENNOSIDES AND DOCUSATE SODIUM 1 TABLET: 8.6; 5 TABLET ORAL at 08:30

## 2020-08-18 RX ADMIN — LORAZEPAM 1 MG: 2 INJECTION INTRAMUSCULAR; INTRAVENOUS at 09:30

## 2020-08-18 ASSESSMENT — MIFFLIN-ST. JEOR: SCORE: 1943.4

## 2020-08-18 NOTE — PROGRESS NOTES
Cardiac Stress protocol  T2, T2 FS  Post SSFP imaging  Contrast administered per weight based protocol.  Per Dr Montague

## 2020-08-18 NOTE — PLAN OF CARE
Neuro: Alert and oriented x 4, anxious of cardiac MRI  Vital signs: 158/90, better in the morning 120/59  Respiratory: RA, LS clear  Pain:complained of back pain, oxycodone given with some relief  Tele:SR  Activity:SBA, refused alarms  Drips/Drains/IVF:  Skin:incisional site on the lower back, open to air  GI/:NPO since midnight, adequate void this shift  Aggression color:green  Plan:Test/Consult:plan for cardiac MRI 8/18/20  Labs:  and 110  Misc: pt has severe anxiety once this shift, relieved with ativan. Psychiatric consult placed. Trazodone given, rested well overnight

## 2020-08-18 NOTE — PROVIDER NOTIFICATION
MD Notification    Notified Person: MD    Notified Person Name:  Dr. Stoddard    Notification Date/Time:  11:45 8/18/20    Notification Interaction:  Text paged    Purpose of Notification:  New order for Carvedilol, but pt already on metoprolol.  Please clarify what is desired.    Orders Received:  Awaiting clarification

## 2020-08-18 NOTE — PROGRESS NOTES
[facility]    Cardiology Progress Note     Assessment & Plan     This is a 65 year old gentleman with extensive past medical history that is most notable for systemic amyloidosis, chronic Bipolar disorder, chronic opioid dependence, Type 2 Diabetes mellitus, hypertension, dyslipidemia, and NORMA; who underwent recent lumbar decompression surgery at OSH admitted with lower extremity weakness and had elevated troponin. Cardiology consulted for management.      EKG findings of low voltage with chronically elevated troponin in setting of systemic amyloid raise suspicious for cardiac amyloid. Transthoracic echocardiogram not sensitive in detection of amyloid and ultimately cardiac MRI, biopsy are more definitive. Discussed in depth with patient who is amenable to plan as long as presedated with ativan. I do not suspect obstructive CAD and NSTEMI as a result given negative coronary angiogram in 2018 at [facility] although cannot completely exclude accelerated CAD.      Recommendations:     1. Cardiac MRI with contrast and stress today - will need pre-medication for anxiety   2. Aspirin 81 mg daily, atorvastatin 20 mg daily   3. If CMR and stress test equivocal, would then consider repeat coronary angiogram versus coronary CT angiogram.   4. HTN: would start coreg 6.25 mg twice a day       Debra Stoddard MD  Text Page  (Monday - Friday, 8 am- 5 pm)    Interval History     Nursing notes reviewed. Patient with severe anxiety overnight.     Physical Exam   Temp: 97.6  F (36.4  C) Temp src: Oral BP: (!) 163/83 Pulse: 71 Heart Rate: 64 Resp: 16 SpO2: 97 % O2 Device: None (Room air)    Vitals:    08/17/20 0127 08/17/20 0449 08/18/20 0616   Weight: 117 kg (258 lb) 119.9 kg (264 lb 4.8 oz) 119.5 kg (263 lb 6.4 oz)     Vital Signs with Ranges  Temp:  [97.6  F (36.4  C)-98.3  F (36.8  C)] 97.6  F (36.4  C)  Pulse:  [68-71] 71  Heart Rate:  [64-80] 64  Resp:  [16-20] 16  BP: (120-163)/(59-90) 163/83  SpO2:  [95  %-97 %] 97 %  I/O last 3 completed shifts:  In: 480 [P.O.:480]  Out: -   Patient Active Problem List   Diagnosis     Obstructive sleep apnea     Hyperlipidemia LDL goal <100     Hypertension goal BP (blood pressure) < 140/90     Advanced directives, counseling/discussion     Migraine     History of diverticulitis     MENTAL HEALTH     Marianne (H)     Bipolar I disorder (H)     Chronic abdominal pain     Anxiety     Other amyloidosis (H)     Insomnia due to medical condition     Narcotic dependence (H)     Obstructive sleep apnea syndrome     Type 2 diabetes mellitus without complication, without long-term current use of insulin (H)     Restless legs syndrome (RLS)     Type 2 diabetes mellitus with other specified complication (H)     Peripheral edema     Morbid obesity (H)     Stasis dermatitis of both legs     Polyneuropathy associated with underlying disease (H)     Acquired lymphedema     Renal failure     Low blood pressure reading     History of peripheral stem cell transplant (H)     Anxiety and depression     Other cirrhosis of liver (H) possibly from vences      Diarrhea of presumed infectious origin     Diarrhea     KWABENA (acute kidney injury) (H)     Acute kidney failure, unspecified (H)     EKG abnormality     Bilateral leg edema     Onychomycosis     Venous ulcer (H)     Thrombocytopenia, unspecified (H)       Constitutional: Awake, alert, cooperative, no apparent distress  Respiratory: Clear to auscultation bilaterally, no crackles or wheezing  Cardiovascular: Regular rate and rhythm, normal S1 and S2, and no murmur noted  GI: Normal bowel sounds, soft, non-distended, non-tender  Skin/Integumen: No rashes, no cyanosis, no edema  Other:  WWP    Medications     intrathecal pump infusion: prepared with commercial ingredients 0.025 mL/hr at 08/17/20 1619       acetaminophen  500 mg Oral 4x Daily     aminophylline  100 mg Intravenous Once     aspirin  162 mg Oral Once     aspirin  81 mg Oral Daily     atorvastatin   20 mg Oral Daily     divalproex sodium delayed-release  1,000 mg Oral Daily     gadobutrol  30 mL Intravenous Once     insulin aspart  1-7 Units Subcutaneous TID AC     insulin aspart  1-5 Units Subcutaneous At Bedtime     lurasidone  60 mg Oral At Bedtime     metoprolol tartrate  25 mg Oral BID     modafinil  200 mg Oral Daily     pregabalin  100 mg Oral TID     regadenoson  0.4 mg Intravenous Once     senna-docusate  1 tablet Oral Daily     senna-docusate  2 tablet Oral QPM     sodium chloride (PF)   mL Intravenous Once     sodium chloride (PF)  3 mL Intracatheter Q8H     tamsulosin  0.4 mg Oral BID       Data   Results for orders placed or performed during the hospital encounter of 08/17/20 (from the past 24 hour(s))   Glucose by meter   Result Value Ref Range    Glucose 118 (H) 70 - 99 mg/dL   Glucose by meter   Result Value Ref Range    Glucose 151 (H) 70 - 99 mg/dL   Glucose by meter   Result Value Ref Range    Glucose 160 (H) 70 - 99 mg/dL   Glucose by meter   Result Value Ref Range    Glucose 110 (H) 70 - 99 mg/dL   Basic metabolic panel   Result Value Ref Range    Sodium 142 133 - 144 mmol/L    Potassium 4.6 3.4 - 5.3 mmol/L    Chloride 114 (H) 94 - 109 mmol/L    Carbon Dioxide 28 20 - 32 mmol/L    Anion Gap <1 (L) 3 - 14 mmol/L    Glucose 99 70 - 99 mg/dL    Urea Nitrogen 21 7 - 30 mg/dL    Creatinine 0.95 0.66 - 1.25 mg/dL    GFR Estimate 84 >60 mL/min/[1.73_m2]    GFR Estimate If Black >90 >60 mL/min/[1.73_m2]    Calcium 8.2 (L) 8.5 - 10.1 mg/dL   Glucose by meter   Result Value Ref Range    Glucose 98 70 - 99 mg/dL   Glucose by meter   Result Value Ref Range    Glucose 88 70 - 99 mg/dL     *Note: Due to a large number of results and/or encounters for the requested time period, some results have not been displayed. A complete set of results can be found in Results Review.

## 2020-08-18 NOTE — SIGNIFICANT EVENT
Significant Event Note    Spoke with Najma RINCON from [facility] pain clinic in regards to his implanted pain pump, a medtronic device that is MR compatible.     The pain pump will stall during the MRI, but the longest it will be nonfunctional is 1-2 hours. He should self interrogate 2 hours after with his CPT, which he is bringing from home via his daughter    From Najma's standpoint in her experience, he will not even need adjunctive opiate pain control via alternative route during this brief period.    Discussed with patient and nursing    Raphael Silvestre, DO

## 2020-08-18 NOTE — PROGRESS NOTES
Cardiology Note - brief:    Cardiac MRI reviewed with imaging. No evidence of current amyloid however given elevated TN and EKG findings could still have early process. Yearly CMR is recommended for ongoing surveillance.    Will sign off.    Dr. Stoddard

## 2020-08-18 NOTE — PROGRESS NOTES
Cross Cover:  Patient with severe anxiety, almost panic at shift-change. Has PRN ativan, but for cardiac MRI tomorrow    Added PRN ativan to be given now for severe anxiety    Added psychiatry consult for assessment of his anxiety (he has been meaning to speak with his PCP regarding being started on meds as outpatient)    Added PRN trazodone for help sleeping.    WISAM Sandoval, DO

## 2020-08-18 NOTE — DISCHARGE SUMMARY
[facility]  Hospitalist Discharge Summary      Date of Admission:  8/17/2020  Date of Discharge:  8/18/2020  Discharging Provider: Raphael Silvestre,       Discharge Diagnoses   Acute kidney injury  Shoulder discomfort, chronic troponin leak    Follow-ups Needed After Discharge   Follow-up Appointments     Follow-up and recommended labs and tests       Follow up with primary care provider, Vince Henry, within 7 days for   hospital follow- up.  No follow up labs or test are needed.             Unresulted Labs Ordered in the Past 30 Days of this Admission     No orders found for last 31 day(s).      These results will be followed up by none    Discharge Disposition   Discharged to home  Condition at discharge: Stable      Hospital Course   Weakness and myonecrosis:   Resolved with fluids  Cardiology was consulted and underwent MRI that showed no e/o amyloid, no signs of ischemia on the stress portion  Plan  - discharge from home  - will need yearly MRI, he could have early cardiac amyloid  - continue home meds     KWABENA: Of note, he was actually hospitalized three times in a week here 4/2020, at first for management of opiate withdrawal, then for hypotension and KWABENA thought due to unintentional Lisinopril overdose (Cr peaked at 2.09), and then for diaphoresis and nausea prompting cardiac evaluation as above. He says his Lisinopril was held for a time afterward and Cr trended downward, last checked 7/27 and was 1.05. He recently started taking Lisinopril again as directed.   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 He received chemotherapy with Cytoxan, Velcade and dexamethasone followed by autologous stem cell transplant in 12/2016 at [facility]; he is followed by a  provider there on a yearly basis and says he has a follow up scheduled next month. His last visit was 7/2019.     Possible cirrhosis thought due to MEJIAS: With concomitant splenomegaly, it seems first noted on imaging 4/2020. Also with splenic hemangioma noted 4/2020. Close outpatient follow up recommended.      Chronic anemia and thrombocytopenia: HGB 10.6 today and was 11.8 on 7/27/2020.  (103 on 7/27).     -- Monitor as needed.      Chronic opioid dependence: It seems he has had an intrathecal pump for chronic abdominal pain     Bipolar disorder type 1: Notes also document OCD.     -- Resume Depakote cautiously at discharge, noting that this may not be the best medication for him long term if in fact cirrhosis is confirmed.     Type 2 Diabetes mellitus: Most recent A1c 5.5 on 6/2020 . On four oral medications as an outpatient.       Hypertension, dyslipidemia     NORMA and RLS:  Consultations This Hospital Stay   CARDIOLOGY IP CONSULT  PSYCHIATRY IP CONSULT    Code Status   Full Code    Time Spent on this Encounter   I, Raphael Silvestre DO, personally saw the patient today and spent greater than 30 minutes discharging this patient.       Raphael Silvestre DO  [facility]  ______________________________________________________________________    Physical Exam   Vital Signs: Temp: 98  F (36.7  C) Temp src: Oral BP: 114/68 Pulse: 69 Heart Rate: 78 Resp: 18 SpO2: 96 % O2 Device: None (Room air)    Weight: 263 lbs 6.4 oz  Constitutional: Alert and oriented to person, place and time; no apparent distress; morbid obesity  HEENT: normocephalic moist mucus membranes  Respiratory: lungs clear to auscultation bilaterally  Cardiovascular: regular S1 S2 no murmurs rubs or gallops  GI: abdomen soft non tender non distended bowel sounds positive  Lymph/Hematologic: no pallor, no cervical lymphadenopathy  Skin: no rash, good turgor  Musculoskeletal: mild to moderate bilateral LE edema  Neurologic:  extra-ocular muscles intact; moves all four extremities  Psychiatric: appropriate affect, insight and judgment       Primary Care Physician   Vince Henry    Discharge Orders      Reason for your hospital stay    Acute kidney injury     Follow-up and recommended labs and tests     Follow up with primary care provider, Vince Henry, within 7 days for hospital follow- up.  No follow up labs or test are needed.     Activity    Your activity upon discharge: activity as tolerated     Full Code     Diet    Follow this diet upon discharge: Orders Placed This Encounter      Low Saturated Fat Na <2400 mg       Significant Results and Procedures   Most Recent 3 CBC's:  Recent Labs   Lab Test 08/17/20  0132 07/27/20  1432 06/10/20  1031   WBC 4.9 6.7 6.2   HGB 10.6* 11.8* 11.7*   MCV 95 98 102*   * 103* 111*     Most Recent 3 BMP's:  Recent Labs   Lab Test 08/18/20  0544 08/17/20  0550 08/17/20 0132    141 140   POTASSIUM 4.6 4.5 4.3   CHLORIDE 114* 113* 111*   CO2 28 23 23   BUN 21 37* 38*   CR 0.95 1.48* 1.60*   ANIONGAP <1* 5 6   LUIS 8.2* 8.1* 7.6*   GLC 99 119* 151*     Most Recent 2 LFT's:  Recent Labs   Lab Test 08/17/20 0132 07/27/20  1432   AST 16 16   ALT 35 26   ALKPHOS 72 76   BILITOTAL 0.3 0.4     Most Recent 3 INR's:  Recent Labs   Lab Test 04/17/20 2009 01/27/18  1556 02/19/17  0734   INR 1.18* 1.06 1.08   ,   Results for orders placed or performed during the hospital encounter of 08/17/20   CT Chest Pulmonary Embolism w Contrast    Narrative    EXAM: CT CHEST PULMONARY EMBOLISM W CONTRAST  LOCATION: Health system  DATE/TIME: 8/17/2020 3:02 AM    INDICATION: PE suspected, intermediate prob, positive D-dimer  See the Clinical Information for Interpreting Provider  COMPARISON: 04/06/2018  TECHNIQUE: CT chest pulmonary angiogram during arterial phase injection of IV contrast. Multiplanar reformats and MIP reconstructions were performed. Dose reduction techniques were used.    CONTRAST: 80mL Isovue-370    FINDINGS:  ANGIOGRAM CHEST: Pulmonary arteries are normal caliber and negative for pulmonary emboli. Thoracic aorta is negative for dissection. No CT evidence of right heart strain.    LUNGS AND PLEURA: No infiltrate or pleural effusion. Stable 6 mm right lower lobe nodule.    MEDIASTINUM/AXILLAE: Calcified mediastinal lymph nodes. Stable mediastinal adenopathy.    UPPER ABDOMEN: Cholecystectomy. Prominent nodes at the jaguar hepatis unchanged. Spermatic vein. Splenomegaly.    MUSCULOSKELETAL: Degenerative change osseous structures. Intrathecal lead. Mild axillary adenopathy similar. Mild loss of height upper thoracic spine.      Impression    IMPRESSION:  1.  No pulmonary embolism or thoracic aortic dissection.  2.  Mediastinal and axillary adenopathy unchanged.  3.  Stable right lower lobe pulmonary nodule.  4.  Splenomegaly.   MR Cardiac w Contrast and Stress    Narrative                                                  [facility]                                                     CMR Report      MRN:                  [id number]                                  Name:            JULIO PRESCOTT                                  :                  1955                                  Scan Date:   2020 09:51:21                                  Electronically signed by Randell Montague 2020-Aug-18 12:49:10    SUMMARY   ==========================================================================================================    Clinical history: Chest pain. troponin elevation, h/o systemic amyloidosis s/p BMT.  Comparison CMR: 2018    1. The LV is normal in cavity size and wall thickness. The global systolic function is normal. The LVEF is  66 %. There are no regional wall motion abnormalities.    2. The RV is normal in cavity size. The global systolic function is normal. The RVEF is 59 %.     3. Mild bi-atrial enlargement.    4. Late gadolinium enhancement  imaging shows patchy delayed enhancement in the basal septal wall.     5. Regadenoson stress perfusion imaging shows no ischemia.    6. There is no pericardial effusion.    7. There is no intracardiac thrombus.      CONCLUSIONS:   1. Normal LV and RV systolic functions.  2. Late gadolinium enhancement imaging shows patchy delayed enhancement in the basal septal wall consistent  with non CAD non specific scar.  3. No evidence of inducible ischemia on regadenoson stress perfusion.    On direct comparison to prior CMR dated 01/25/2018 there is interval decrease in LV wall thickness.    CORE EXAM   ==========================================================================================================    MEASUREMENTS   ----------------------------------------------------------------------------------------      VOLUMETRIC ANALYSIS       ----------------------------------------------  .---------------------------------------------------------.                   LV    Reference   RV    Reference    +------+----------+------+------------+------+------------+   EDV   ml         198   (109-191)   196   (105-205)          ml/mï         87   (60-95)      86   ()     ESV   ml          67   (27-72)      80   (20-80)            ml/mï         30   (14-36)      35   (11-40)      CO    L/min     9.04              8.00                      L/min/mï    3.98              3.52                MASS  g          147   (107-183)                            g/mï          65   (57-90)                        SV    ml         131   ()    116   ()           ml/mï         58   (40-64)      51   (37-69)      EF    %           66   (58-76)      59   (55-81)     '------+----------+------+------------+------+------------'            CARDIAC OUTPUT HR:  69 BPM      AORTIC ROOT DIMENSIONS       ----------------------------------------------           SINUS OF VALSALVA:  3.7 cm          AORTIC ROOT SIZE:  MILDLY DILATED      ANATOMY   ----------------------------------------------------------------------------------------      LEFT VENTRICLE       ----------------------------------------------          WALL THICKNESS:  Normal        INTERATRIAL SEPTUM       ----------------------------------------------   ATRIAL SEPTUM: Normal         LEFT ATRIUM       ----------------------------------------------          CAVITY SIZE:  MILDLY ENLARGED        RIGHT ATRIUM       ----------------------------------------------          CAVITY SIZE:  MILDLY ENLARGED        PERICARDIUM       ----------------------------------------------          EFFUSION:  None      VALVES   ----------------------------------------------------------------------------------------      AORTIC VALVE       ----------------------------------------------          AORTIC VALVE LEAFLETS:  Trileaflet      17 SEGMENT   ----------------------------------------------------------------------------------------  .-----------------------------------------------------------------------------------------.   Segments            Wall Motion  Hyperenhancement  Stress Perfusion  Interpretation   +--------------------+-------------+------------------+------------------+----------------+   Base Anterior                                                                          Base Anteroseptal                26-50%                                                Base Inferoseptal                                                                      Base Inferior                                                                          Base Inferolateral                                                                     Base Anterolateral                                                                     Mid Anterior                                                                            Mid Anteroseptal                                                                       Mid Inferoseptal                                                                       Mid Inferior                                                                           Mid Inferolateral                                                                      Mid Anterolateral                                                                      Apical Anterior                                                                        Apical Septal                                                                          Apical Inferior                                                                        Apical Lateral                                                                         [place]                                                                                  +--------------------+-------------+------------------+------------------+----------------+   RV Segments         Wall Motion  Hyperenhancement  Stress Perfusion  Interpretation   +--------------------+-------------+------------------+------------------+----------------+   RV Basal Anterior                                                                      RV Basal Inferior                                                                      RV Mid                                                                                 RV Apical                                                                             '--------------------+-------------+------------------+------------------+----------------'        FINDINGS       ----------------------------------------------          SCAR SIZE:  2 %      SCAN INFO   ==========================================================================================================    GENERAL    ----------------------------------------------------------------------------------------      CONTRAST AGENT       ----------------------------------------------          TYPE:  Gadavist          GD CONCENTRATION:  0.5 M          VOLUME ADMINISTERED:  30 ml          DOSAGE:  0.13 mmol/kg        SEDATION       ----------------------------------------------          SEDATION USED?:  Yes          TYPE:  Other...          OTHER TYPE:  ativan           DOSE:  1 mg          ANY REACTION?:  No        VITALS       ----------------------------------------------          HEIGHT:  66.93 in          SYSTOLIC BP:  144 mmHg          HEIGHT:  170.00 cm          DIASTOLIC BP:  96 mmHg          WEIGHT:  262.99 lbs          BASELINE HR:  70 BPM          WEIGHT:  119.29 kgs          HEART RHYTHM:  Other...          BSA:  2.27 mï            DESCRIBE HEART RHYTHM::  SR, bifascicular block         PULSE SEQUENCES       ----------------------------------------------          SSFP cine, 2D LGE segmented, 2D LGE single-shot, T2-weighted imaging, First-pass perfusion with          stress         SETUP       ----------------------------------------------          REASON(S) FOR SCAN:  Chest pain           REFERRING PHYSICIAN:  CLARISA BRAVO          ATTENDING PHYSICIAN:  SOLEDAD YEPEZ   ----------------------------------------------------------------------------------------      CPT Codes      ICD10 Codes      Report generated by [employer], a product of [employer]     *Note: Due to a large number of results and/or encounters for the requested time period, some results have not been displayed. A complete set of results can be found in Results Review.       Discharge Medications   Current Discharge Medication List      CONTINUE these medications which have CHANGED    Details   acetaminophen (TYLENOL) 325 MG tablet Take 2 tablets (650 mg) by mouth every 4 hours as needed for mild pain  Qty:      Associated Diagnoses:  Generalized muscle weakness         CONTINUE these medications which have NOT CHANGED    Details   alpha-lipoic acid 100 MG capsule Take 200 mg by mouth daily      aspirin (ASPIRIN LOW DOSE) 81 MG tablet Take 1 tablet (81 mg) by mouth daily  Qty: 30 tablet, Refills: 3    Associated Diagnoses: Hyperlipidemia LDL goal <100; Hypertension goal BP (blood pressure) < 140/90      atorvastatin (LIPITOR) 20 MG tablet Take 1 tablet (20 mg) by mouth daily  Qty: 90 tablet, Refills: 1    Associated Diagnoses: Hyperlipidemia LDL goal <100      caffeine (NO-DOZE) 200 MG TABS tablet Take 400 mg by mouth daily      clobetasol (TEMOVATE) 0.05 % external ointment Apply topically daily       divalproex sodium delayed-release (DEPAKOTE) 500 MG DR tablet Take 2 tablets (1,000 mg) by mouth daily  Qty: 10 tablet, Refills: 0    Comments: Under care of Dr KING ([facility])  Associated Diagnoses: Mood disorder (H)      glimepiride (AMARYL) 1 MG tablet Take 1 tablet (1 mg) by mouth every morning (before breakfast)  Qty: 90 tablet, Refills: 0    Associated Diagnoses: Type 2 diabetes mellitus with other specified complication, without long-term current use of insulin (H)      lisinopril (ZESTRIL) 20 MG tablet Take 1.5 tablets (30 mg) by mouth daily  Qty: 135 tablet, Refills: 3    Comments: Updated dose  Associated Diagnoses: Hypertension goal BP (blood pressure) < 140/90      metFORMIN (GLUCOPHAGE-XR) 500 MG 24 hr tablet Take 1000mg in AM and 1000mg in PM  Qty: 360 tablet, Refills: 1    Associated Diagnoses: Type 2 diabetes mellitus with other specified complication, without long-term current use of insulin (H)      modafinil (PROVIGIL) 200 MG tablet daily       Multiple Vitamins-Minerals (SENIOR MULTIVITAMIN PLUS PO) Take 1 tablet by mouth daily      NONFORMULARY by Intrathecal route continuous - + up to 4 boluses daily    Managed by [facility]     Medications in Pump:  fentanyl 2000mcg/mL  Bupivacaine  20mg/mL  Morphine 5.8mg/mL     Rate:   Fentanyl 1200mcg/day  Bupivacaine 12mg/day  Morphine 4.2mg/day  Pump Last Fill Date:  4/16/2020      omega-3 acid ethyl esters (LOVAZA) 1 g capsule Take 1 capsule (1 g) by mouth daily  Qty: 90 capsule, Refills: 3    Associated Diagnoses: Hyperlipidemia LDL goal <100      pregabalin (LYRICA) 100 MG capsule Take 1 capsule (100 mg) by mouth 3 times daily  Qty: 360 capsule, Refills: 3    Associated Diagnoses: Peripheral polyneuropathy; Polyneuropathy associated with underlying disease (H)      !! senna-docusate (SENOKOT-S/PERICOLACE) 8.6-50 MG tablet Take 2 tablets by mouth every evening      !! senna-docusate (SENOKOT-S/PERICOLACE) 8.6-50 MG tablet Take 1 tablet by mouth daily Take 1 tablet in the morning and 2 tablets in the evening.       sitagliptin (JANUVIA) 100 MG tablet Take 1 tablet (100 mg) by mouth daily  Qty: 90 tablet, Refills: 0    Associated Diagnoses: Type 2 diabetes mellitus with other specified complication, without long-term current use of insulin (H)      SUMAtriptan (IMITREX) 50 MG tablet Take 1 tablet (50 mg) by mouth at onset of headache for migraine May repeat in 2 hours. Max 4 tablets/24 hours.  Qty: 8 tablet, Refills: 1    Associated Diagnoses: Mixed common migraine and muscle contraction headache      tamsulosin (FLOMAX) 0.4 MG capsule Take 1 capsule (0.4 mg) by mouth 2 times daily  Qty: 30 capsule, Refills: 3    Associated Diagnoses: Urinary retention with incomplete bladder emptying      Turmeric 500 MG CAPS Take 1 capsule by mouth daily      vitamin C (ASCORBIC ACID) 1000 MG TABS Take 1,000 mg by mouth daily      blood glucose (ACCU-CHEK COMPACT DRUM) test strip Use to test blood sugars 3 to 4 times daily.  Qty: 400 strip, Refills: 1    Associated Diagnoses: Type 2 diabetes mellitus without complication, without long-term current use of insulin (H)      blood glucose monitoring (ACCU-CHEK COMPACT CARE KIT) meter device kit Use to test blood sugars 3 to  4  times daily.  Qty: 1 kit, Refills: 0    Associated Diagnoses: Type 2 diabetes mellitus without complication, without long-term current use of insulin (H)      blood glucose monitoring (SOFTCLIX) lancets USE TO TEST BLOOD SUGAR 3-4 TIMES DAILY OR AS DIRECTED.  Refills: 1      lurasidone (LATUDA) 60 MG TABS tablet Take 1 tablet (60 mg) by mouth At Bedtime  Qty: 60 tablet, Refills: 0    Associated Diagnoses: Bipolar I disorder (H)      metoprolol succinate ER (TOPROL-XL) 50 MG 24 hr tablet TAKE 1 TABLET(50 MG) BY MOUTH DAILY  Qty: 90 tablet, Refills: 1    Associated Diagnoses: Hypertension goal BP (blood pressure) < 140/90       !! - Potential duplicate medications found. Please discuss with provider.      STOP taking these medications       furosemide (LASIX) 20 MG tablet Comments:   Reason for Stopping:         ibuprofen (ADVIL/MOTRIN) 200 MG tablet Comments:   Reason for Stopping:             Allergies   Allergies   Allergen Reactions     Cephalexin Diarrhea     Liraglutide Other (See Comments)     Sulfa Drugs Swelling     Pt has taken  Taken sulfa drugs orally without trouble. He had problems with Sulfa eye drops. Eye swelled up     Victoza      Increased migraine frequency and severity

## 2020-08-19 ENCOUNTER — TELEPHONE (OUTPATIENT)
Dept: FAMILY MEDICINE | Facility: CLINIC | Age: 65
End: 2020-08-19

## 2020-08-19 ENCOUNTER — TRANSFERRED RECORDS (OUTPATIENT)
Dept: HEALTH INFORMATION MANAGEMENT | Facility: CLINIC | Age: 65
End: 2020-08-19

## 2020-08-19 NOTE — TELEPHONE ENCOUNTER
Hospital F/U 8/18/2020 DX:Chest Pain, Unspecified Type ED/IP 2/1    [phone] (home) [phone] (work)

## 2020-08-19 NOTE — TELEPHONE ENCOUNTER
ED / Discharge Outreach Protocol    Patient Contact    Attempt # 1    Was call answered?  No.  Left message on voicemail with information to call me back.    [facility]  Hospitalist Discharge Summary       Date of Admission:  8/17/2020  Date of Discharge:  8/18/2020    Discharge Diagnoses     Acute kidney injury  Shoulder discomfort, chronic troponin leak    Follow-ups Needed After Discharge     Follow-up Appointments     Follow-up and recommended labs and tests       Follow up with primary care provider, Vince Henry, within 7 days for   hospital follow- up.  No follow up labs or test are needed.       Wandy Pena RN

## 2020-08-24 ENCOUNTER — OFFICE VISIT (OUTPATIENT)
Dept: FAMILY MEDICINE | Facility: CLINIC | Age: 65
End: 2020-08-24
Payer: COMMERCIAL

## 2020-08-24 ENCOUNTER — TELEPHONE (OUTPATIENT)
Dept: FAMILY MEDICINE | Facility: CLINIC | Age: 65
End: 2020-08-24

## 2020-08-24 VITALS
SYSTOLIC BLOOD PRESSURE: 93 MMHG | TEMPERATURE: 97.5 F | DIASTOLIC BLOOD PRESSURE: 65 MMHG | OXYGEN SATURATION: 96 % | WEIGHT: 266 LBS | BODY MASS INDEX: 41.66 KG/M2 | HEART RATE: 90 BPM

## 2020-08-24 DIAGNOSIS — Z98.890 STATUS POST LUMBAR SPINE SURGERY FOR DECOMPRESSION OF SPINAL CORD: Primary | ICD-10-CM

## 2020-08-24 DIAGNOSIS — N17.9 ACUTE KIDNEY INJURY (H): ICD-10-CM

## 2020-08-24 DIAGNOSIS — R03.1 LOW BLOOD PRESSURE READING: ICD-10-CM

## 2020-08-24 DIAGNOSIS — I10 HYPERTENSION GOAL BP (BLOOD PRESSURE) < 140/90: ICD-10-CM

## 2020-08-24 DIAGNOSIS — R60.0 BILATERAL LEG EDEMA: ICD-10-CM

## 2020-08-24 LAB
ALBUMIN SERPL-MCNC: 3.3 G/DL (ref 3.4–5)
ALP SERPL-CCNC: 82 U/L (ref 40–150)
ALT SERPL W P-5'-P-CCNC: 31 U/L (ref 0–70)
ANION GAP SERPL CALCULATED.3IONS-SCNC: 9 MMOL/L (ref 3–14)
AST SERPL W P-5'-P-CCNC: 16 U/L (ref 0–45)
BILIRUB SERPL-MCNC: 0.3 MG/DL (ref 0.2–1.3)
BUN SERPL-MCNC: 28 MG/DL (ref 7–30)
CALCIUM SERPL-MCNC: 8.4 MG/DL (ref 8.5–10.1)
CHLORIDE SERPL-SCNC: 111 MMOL/L (ref 94–109)
CO2 SERPL-SCNC: 21 MMOL/L (ref 20–32)
CREAT SERPL-MCNC: 1.17 MG/DL (ref 0.66–1.25)
GFR SERPL CREATININE-BSD FRML MDRD: 65 ML/MIN/{1.73_M2}
GLUCOSE SERPL-MCNC: 120 MG/DL (ref 70–99)
POTASSIUM SERPL-SCNC: 4.2 MMOL/L (ref 3.4–5.3)
PROT SERPL-MCNC: 6.2 G/DL (ref 6.8–8.8)
SODIUM SERPL-SCNC: 141 MMOL/L (ref 133–144)

## 2020-08-24 PROCEDURE — 99214 OFFICE O/P EST MOD 30 MIN: CPT | Performed by: FAMILY MEDICINE

## 2020-08-24 PROCEDURE — 80053 COMPREHEN METABOLIC PANEL: CPT | Performed by: FAMILY MEDICINE

## 2020-08-24 PROCEDURE — 36415 COLL VENOUS BLD VENIPUNCTURE: CPT | Performed by: FAMILY MEDICINE

## 2020-08-24 NOTE — TELEPHONE ENCOUNTER
Reason for Call:  Home Health Care    Doris with [facility] called regarding (reason for call): Home Care cannot accept the referral because they do not have PCA services.     Pt Provider: Dr. Henry    Phone Number Homecare Nurse can be reached at: [phone]    Can we leave a detailed message on this number? YES    Phone number patient can be reached at: Cell number on file:    Telephone Information:   Mobile [phone]       Best Time: ANY    Call taken on 8/24/2020 at 11:14 AM by Dyana Mendoza

## 2020-08-24 NOTE — PROGRESS NOTES
Subjective     Parmjit Hernández is a 65 year old male who presents to clinic today for the following health issues:    HPI         Hospital Follow-up Visit:    Hospital/Nursing Home/IP Rehab Facility: [facility]  Date of Admission: 8/17/20  Date of Discharge: 8/18/20  Reason(s) for Admission: Dizzy       Was your hospitalization related to COVID-19? No   Problems taking medications regularly:  None  Medication changes since discharge: changed, taken off lasiks   Problems adhering to non-medication therapy:  None    Summary of hospitalization:  [facility] discharge summary reviewed  Diagnostic Tests/Treatments reviewed.  Follow up needed: none  Other Healthcare Providers Involved in Patient s Care:         None  Update since discharge: improved.   Post Discharge Medication Reconciliation: discharge medications reconciled, continue medications without change.  Plan of care communicated with patient        The patient presents to clinic for a post-hospital visit. He underwent L4-5 Decompression surgery on 08/06/2020. He continues to have intermittent pain. He is unable to bend down and put on his compression stockings. Unable to cook or clean during this recovery period. Desires to have a PCA.     Also, he noticed that his blood pressure is low. Denies any headaches or dizziness. Denies any chest pain or shortness of breath.     Also, he had a recent acute kidney injury because of dehydration. Repeat labs are pending.     Review of Systems   Constitutional, HEENT, cardiovascular, pulmonary, gi and gu systems are negative, except as otherwise noted.      Objective    BP 93/65 (BP Location: Left arm, Patient Position: Sitting)   Pulse 90   Temp 97.5  F (36.4  C) (Tympanic)   Wt 120.7 kg (266 lb)   SpO2 96%   BMI 41.66 kg/m    Body mass index is 41.66 kg/m .  Physical Exam   GENERAL: healthy, alert and no distress  RESP: lungs clear to auscultation - no rales, rhonchi or wheezes  CV: regular  rate and rhythm, normal S1 S2, no S3 or S4, no murmur, click or rub, no peripheral edema and peripheral pulses strong  MS: trace edema in bilateral lower extremities.     No results found. However, due to the size of the patient record, not all encounters were searched. Please check Results Review for a complete set of results.        Assessment & Plan     1. Status post lumbar spine surgery for decompression of spinal cord  Assessment: recovering well after surgery. Struggling with compression stockings.   Plan:  - HOME CARE NURSING REFERRAL  - **Comprehensive metabolic panel FUTURE anytime    2. Acute kidney injury (H)  Recovering well. Discontinued lisinopril today. Will restart medications if level is back to normal and BP is normal  - **Comprehensive metabolic panel FUTURE anytime    3. Hypertension goal BP (blood pressure) < 140/90  Well controlled.   - lisinopril on hold because of KWABENA.   - **Comprehensive metabolic panel FUTURE anytime    4. Low blood pressure reading  Mild decrease in his BP. Denies any symptoms.   - advised to start oral hydration.   - hold lisinopril  - **Comprehensive metabolic panel FUTURE anytime    5. Bilateral leg edema  - HOME CARE NURSING REFERRAL  - **Comprehensive metabolic panel FUTURE anytime    Return in about 1 month (around 9/24/2020) for Follow-up visit.    Vince Henry MD  [facility]

## 2020-08-25 ENCOUNTER — PATIENT OUTREACH (OUTPATIENT)
Dept: CARE COORDINATION | Facility: CLINIC | Age: 65
End: 2020-08-25

## 2020-08-25 NOTE — PROGRESS NOTES
Clinic Care Coordination Contact    Follow Up Progress Note      Assessment: Patient left  on Saturday asking for a call back. Talked to him today. He is having fluctuations with his blood pressure being a bit high and a bit low.  He is working with his psychiatrist as he is having increased depression which may be due to side effect of the sedation.  They are adjusting his medication.  He is recovering from the back surgery and still has pain.  Is not able to easily bend over to put on his compression stockings. He is able to take them off easily, but would like someone to come every other day to assist. His PCP sent an order for home care to  and he never got a call about starting the services.  Did three way call to  and the order was for a PCA and they don't provide that so they did not call him. They informed his PCP.  Patient disappointed that they did not contact him directly to tell him that.  He hired private nursing from [facility] after his surgery. Reviewed that he may have gotten that paid for by his insurance. He called [employer] at the time, and they denied that they would pay for that.  He will follow up as it cost him $1,000.  He could use [facility] for home health aide, but wonders if there is a cheaper offer than the 4 hour minimum at $34 per hour.  He does have the plastic device to help him get the sock on. He has appointment with his surgeon in 3 1/2 week. He is trying to be patient with the slow recovery. Hopefully after that, he will have less restrictions on his bending over.  He continues to work and is overwhelmed with work.  He has been putting it off.  He appreciates any help from SONNY MANCIA to identify other options for care at home.  He prefers it emailed to him.     Goals addressed this encounter:   Goals Addressed                 This Visit's Progress       Patient Stated      Psychosocial (pt-stated)   20%     Goal Statement: I would like to look into getting a PCA in the next  2 months  Date Goal set: 5/13/20  Barriers: Eligibility  Strengths: Motivated  Date to Achieve By: 2 months  Patient expressed understanding of goal: Yes  Action steps to achieve this goal:  1.  called [employer] who confirms patient does not have this option for coverage.  2.  will send some low-cost home making options(completed)  3. I will update CHW during outreach calls and ask to speak with SW if needed     Updated: 8/5/2020             Intervention/Education provided during outreach: Sent him this email: Kapil Almendarez, I had time to call three agencies to get the fees.      I talked to Risa Weber at [facility] and they would do free in person assessment, 3 hour visits, and have 6 hour minimum per week at $33 per hour.  [phone].  They are willing to help put on your stockings and not have to take them off.  One agency said they would have to do both, which would be awfully expensive.     I call [facility] and they require a 4 hour minimum, at $35-40 per hour with a $150 set up fee and they require 12 hours per week.   [phone]    [facility] [phone] has a 4 hour minimum with 20 hours per week required, hourly is between $32-36 per hour.        Here is a new resource put out by the State of [place] to help people trying to hire their own workers, not going through an agency.  You may be able to find someone through this option.  You need to register and create a profile, but that has no fee.  Generally, much cheaper but you need to do more of the checking of references, etc.      https://Airbrite.com/    [employer]  is [place]'s dedicated job board and hiring resource for direct support workers, such as PCAs.    Another website like this is www.care.com where you can look for your own worker.      Let me know if there is anything else I can do for you.  All the best in recovering from the surgery!      Plan:   Assist patient with  achieving his goal.  Delegating to CHW to do outreach in less than 30 days.  SONNY CC to do oversight in 45 days and as needed.   Xochilt Downs   [facility]  [phone]         Contact  [facility]/Voicemail    Referral Source: Care Team  Clinical Data:  Outreach  Outreach attempted x 1.  Left message on voicemail with call back information and requested return call.  Patient had PCP appointment yesterday and has communicated with MTM since discharge from hospital.   Plan:  will try to reach patient again in 10 business days.  Xochilt Downs   [facility]  [phone]

## 2020-08-26 NOTE — RESULT ENCOUNTER NOTE
Dear Erickson,   Here are your recent results. Your kidney function is back to normal.     Vince Henry MD

## 2020-08-27 ENCOUNTER — TRANSFERRED RECORDS (OUTPATIENT)
Dept: HEALTH INFORMATION MANAGEMENT | Facility: CLINIC | Age: 65
End: 2020-08-27

## 2020-09-01 ENCOUNTER — PATIENT OUTREACH (OUTPATIENT)
Dept: CARE COORDINATION | Facility: CLINIC | Age: 65
End: 2020-09-01

## 2020-09-01 NOTE — PROGRESS NOTES
Clinic Care Coordination Contact  Community Health Worker Follow Up    Delegation from Xochilt 8/31/2020  Assist patient with achieving his goal.  Delegating to CHW to do outreach in less than 30 days.  SW CC to do oversight in 45 days and as needed.   Xochilt Downs,   [facility]  [phone]      CHW Plan:  Outreach has been moved to 9/22/2020 to speak with patient within the 30 days. as Xochilt spoke to patient on 8/25/2020.

## 2020-09-04 ENCOUNTER — HOSPITAL ENCOUNTER (OUTPATIENT)
Dept: NUTRITION | Facility: CLINIC | Age: 65
Discharge: HOME | End: 2020-09-04
Attending: DIETITIAN, REGISTERED | Admitting: DIETITIAN, REGISTERED
Payer: COMMERCIAL

## 2020-09-04 PROCEDURE — 97803 MED NUTRITION INDIV SUBSEQ: CPT | Mod: TEL | Performed by: DIETITIAN, REGISTERED

## 2020-09-04 NOTE — PROGRESS NOTES
"Parmjit Hernández is a 65 year old male who is being evaluated via a billable telephone visit.      The patient has been notified of following:     \"This telephone visit will be conducted via a call between you and your physician/provider. We have found that certain health care needs can be provided without the need for a physical exam.  This service lets us provide the care you need with a short phone conversation.  If a prescription is necessary we can send it directly to your pharmacy.  If lab work is needed we can place an order for that and you can then stop by our lab to have the test done at a later time.    Telephone visits are billed at different rates depending on your insurance coverage. During this emergency period, for some insurers they may be billed the same as an in-person visit.  Please reach out to your insurance provider with any questions.    If during the course of the call the physician/provider feels a telephone visit is not appropriate, you will not be charged for this service.\"    Patient has given verbal consent for Telephone visit?  Yes      OUTPATIENT NUTRITION REASSESSMENT  REASON FOR ASSESSMENT  Parmjit Hernández referred by Dr. Booker for MNT related to   Morbid obesity (H) [E66.01]  - Primary        Other cirrhosis of liver (H) possibly from vences  [K74.69]        Type 2 diabetes mellitus with other specified complication, without long-term current use of insulin (H) [E11.69]           Patient accompanied by self     ASSESSMENT   Nutrition History:  - Information obtained from patient.  - Patient is on a regular diet at home. Patient states was diagnosed with diabetes 15 years ago but has not spoken with dietitian.  Patient states he has difficulty preparing meals due to the pain while standing.  Patient is hoping to hire PCA to prepare meals for him.  Patient shops multiple times per week and buys prepared items from deli or frozen foods.  Patient states does not have large palette when " "eating vegetables.  Patient likes carrots, corn, broccoli, peas, french fries.  Patient receiving COVID meals from his health insurance (3 meals per week). Patient was told he needs to lose 10% body weight in 1 year to help with cirrhosis. Patient states struggles mid-night snacking.  Patient bought Mrs. Huertas to add to vegetables.      Patient feels he needs structured meal plan and is not motivated to prepare foods.  Patient dining out due to convenience and lack of desire to cook.  Patient enjoys [employer]hburgers (cupcake).  Patient states has increased calories, fat and salt intake. Patient would like PCA to help prepare meals for patient but is not able due to cost.     Diet Recall:  Breakfast: eggs with shredded cheese + KIND Bar + Banana (9:00 AM)   Lunch: skips   Snack: Super foods quinoa salad, broccoli/raisin/ salad, craisins, cashews ([employer] deli for salads) 2:00 PM   Dinner: no name parmesan chicken breast, 1/2 plate potatoes and corn, peas or broccoli; jalapeno cheddar bits from [employer] + bagel salami sauce (7:00 PM) or parmesan frozen chicken breast; pizza   Snack: bagel sandwich (salami, provalone, banana peppers, cajun sunshine sauce) 12:00 AM  Beverages: diet soda (Diet Pepsi); water   Dining out: limited due to COVID-19      Exercise: Patient walking daily during past week.      NUTRITION FOCUSED PHYSICAL ASSESSMENT (NFPA) FOR DIAGNOSING MALNUTRITION  No:  Unable to assess due to telephone visit due to COVID-19 pandemic           Observed:   Unable to assess due to telephone visit due to COVID-19 pandemic      Obtained from Chart/Interdisciplinary Team:  None noted      LABS  Labs reviewed     MEDICATIONS  Medications reviewed     ANTHROPOMETRICS   Height: 5'7\"  Weight: 254 lbs   BMI (kg/m2): 39.7 kg/m2   Weight Status:  Obesity Grade II BMI 35-39.9  %IBW: 164%  Weight History: Patient with 10% weight loss in 2 months (fluid balance)   Wt Readings from Last 10 Encounters:   08/24/20 120.7 kg (266 " lb)   08/18/20 119.5 kg (263 lb 6.4 oz)   07/27/20 114.8 kg (253 lb)   06/29/20 115.7 kg (255 lb 1.6 oz)   06/15/20 115.2 kg (254 lb)   06/10/20 119.2 kg (262 lb 11.2 oz)   05/27/20 110.7 kg (244 lb)   05/19/20 115.7 kg (255 lb)   04/23/20 111.4 kg (245 lb 9.6 oz)   04/18/20 114.4 kg (252 lb 4.8 oz)      ASSESSED NUTRITION NEEDS  Estimated Energy Needs: 5386-5666 kcals/day (14-17 Kcal/Kg)  Justification:  (obese)  Estimated Protein Needs: 67-80 grams protein/day (1-1.2 g pro/Kg)  Justification:  (maintenance)  Estimated Fluid Needs: per MD      ASSESSED MALNUTRITION STATUS  % Weight Loss:  >5% weight loss in 1 month (severe malnutrition) noted hospitalization and fluid balance   % Intake:  No decreased intake noted  Subcutaneous Fat Loss:  None observed due to telephone visit due to COVID-19 pandemic   Loss of Muscle Mass:  None observed due to telephone visit due to COVID-19 pandemic   Fluid Retention:  Mild per patient (describes swollen calves at the end of the day if does not wear compression socks)     Malnutrition Diagnosis:  Patient does not meet two of the above criteria necessary for diagnosing malnutrition     EVALUATION/PROGRESS TOWARDS GOALS  Previous nutrition goals:  Reduce portion sizes at dinner-not met      Previous nutrition diagnosis: Food and nutrition-related knowledge deficit related to lack of prior exposure as evidenced by no previous referral for nutrition education-improving       Current nutrition diagnosis: Food and nutrition-related knowledge deficit related to lack of prior exposure as evidenced by no previous referral for nutrition education        INTERVENTIONS   Nutrition Prescription   Recommend modified carbohydrate diet 165-180 grams carbohydrate for weight loss and diabetes control; 2400 mg sodium due to hypertension      IMPLEMENTATION   Assessed learning needs and learning preference  Teaching Method(s) used: Explanation  Diet Education:  Provided education on weight loss,  modified carbohydrate, low sodium diet   Nutrition Education (Content):              a)  Discussed progress towards goals and current intake.  Reviewed portion sizes, label reading, carbohydrate counting and behavior modification.  Patient struggling with behavior modification and wants meal plan.  Discussed protein shake and frozen entrees at meals with 400-500 calories in snacks.  Supported patient with his struggle with diet and weight loss.   Nutrition Education (Application):              a)  Discussed starchy vegetables potatoes, corn, peas and squash and carbohydrate content of foods.               b)  Patient verbalizes understanding of diet by stating will look at protein shake options.   Anticipate fair compliance      GOALS   Eat 3 meals per day (protein shake as meal replacement + 2 frozen entrees) + 2 snacks     FOLLOW UP/MONITORING   Progress towards goals will be monitored and evaluated per protocol and Practice Guidelines  Patient to call for follow up appointment   RD name and number provided      Time Spent with Patient  25 minutes     Renan Arrington, RD, LD  [facility] Outpatient Dietitian  [phone] (office phone)

## 2020-09-16 ENCOUNTER — TRANSFERRED RECORDS (OUTPATIENT)
Dept: HEALTH INFORMATION MANAGEMENT | Facility: CLINIC | Age: 65
End: 2020-09-16

## 2020-09-17 ENCOUNTER — MYC MEDICAL ADVICE (OUTPATIENT)
Dept: FAMILY MEDICINE | Facility: CLINIC | Age: 65
End: 2020-09-17

## 2020-09-18 NOTE — TELEPHONE ENCOUNTER
FS,    Please see below.    Looks like he needs 1 hep A, 2 hep B, 2 shingrix and im not sure about the pnuemovax 23.    I have [facility] pulled up in pt's chart.    Please advise.  Thanks,  Norma Casillas RN

## 2020-09-18 NOTE — TELEPHONE ENCOUNTER
Yes he needs Hep A, Hep B (2/3), pneumococcal 23, shingrix and MMR.    Can he schedule a nurse only visit?    Elaine

## 2020-09-22 ENCOUNTER — PATIENT OUTREACH (OUTPATIENT)
Dept: CARE COORDINATION | Facility: CLINIC | Age: 65
End: 2020-09-22

## 2020-09-22 ENCOUNTER — TRANSFERRED RECORDS (OUTPATIENT)
Dept: HEALTH INFORMATION MANAGEMENT | Facility: CLINIC | Age: 65
End: 2020-09-22

## 2020-09-22 NOTE — PROGRESS NOTES
Clinic Care Coordination Contact  Community Health Worker Follow Up      Intervention and Education during outreach:   Spoke with Erickson today. He said, that he has been provided all the resources for private pay and states he isn't going to follow through due to cost. He understands that his [employer] doesn't cover PCA services and is trying to appeal [employer] for [employer] services he received.  Patient was short during the call today. We discussed a outreach in 2 months to make sure no other needs arise. Patient agrees.         Patient has completed all goals with Clinic Care Coordination.  Please review the chart and confirm if maintenance  is approved.      CHW Plan:   CHW will request chart review for Maintenance to Lead CC  CHW deleted goal per conversation   CHW will reach out in 2 month if approved.

## 2020-09-24 ENCOUNTER — PATIENT OUTREACH (OUTPATIENT)
Dept: CARE COORDINATION | Facility: CLINIC | Age: 65
End: 2020-09-24

## 2020-09-24 NOTE — PROGRESS NOTES
Clinic Care Coordination Contact    Assessment: Per chart review, CHW contacted month follow up.  Patient has continued to follow the plan of care and assessment is negative for any new needs or concerns.    Enrollment status: Maintenance.      Plan: CHW set outreach for 2 months.  If no new needs identified at next outreach send chart to CC to review for graduation.

## 2020-10-01 ENCOUNTER — TRANSFERRED RECORDS (OUTPATIENT)
Dept: HEALTH INFORMATION MANAGEMENT | Facility: CLINIC | Age: 65
End: 2020-10-01

## 2020-10-07 ENCOUNTER — TRANSFERRED RECORDS (OUTPATIENT)
Dept: HEALTH INFORMATION MANAGEMENT | Facility: CLINIC | Age: 65
End: 2020-10-07

## 2020-10-08 ENCOUNTER — OFFICE VISIT (OUTPATIENT)
Dept: FAMILY MEDICINE | Facility: CLINIC | Age: 65
End: 2020-10-08
Payer: COMMERCIAL

## 2020-10-08 VITALS
HEART RATE: 88 BPM | WEIGHT: 269 LBS | RESPIRATION RATE: 16 BRPM | SYSTOLIC BLOOD PRESSURE: 165 MMHG | BODY MASS INDEX: 42.22 KG/M2 | OXYGEN SATURATION: 96 % | TEMPERATURE: 98.3 F | DIASTOLIC BLOOD PRESSURE: 95 MMHG | HEIGHT: 67 IN

## 2020-10-08 DIAGNOSIS — Z23 NEED FOR PROPHYLACTIC VACCINATION AND INOCULATION AGAINST INFLUENZA: ICD-10-CM

## 2020-10-08 DIAGNOSIS — I89.0 ACQUIRED LYMPHEDEMA: ICD-10-CM

## 2020-10-08 DIAGNOSIS — E11.9 TYPE 2 DIABETES MELLITUS WITHOUT COMPLICATION, WITHOUT LONG-TERM CURRENT USE OF INSULIN (H): Primary | ICD-10-CM

## 2020-10-08 DIAGNOSIS — I10 HYPERTENSION GOAL BP (BLOOD PRESSURE) < 140/90: ICD-10-CM

## 2020-10-08 PROCEDURE — 99214 OFFICE O/P EST MOD 30 MIN: CPT | Mod: 25 | Performed by: FAMILY MEDICINE

## 2020-10-08 PROCEDURE — 90662 IIV NO PRSV INCREASED AG IM: CPT | Performed by: FAMILY MEDICINE

## 2020-10-08 PROCEDURE — G0008 ADMIN INFLUENZA VIRUS VAC: HCPCS | Performed by: FAMILY MEDICINE

## 2020-10-08 RX ORDER — FUROSEMIDE 20 MG
20 TABLET ORAL DAILY
Qty: 30 TABLET | Refills: 0 | Status: ON HOLD | OUTPATIENT
Start: 2020-10-08 | End: 2020-11-13

## 2020-10-08 RX ORDER — LITHIUM CARBONATE 300 MG/1
TABLET, FILM COATED, EXTENDED RELEASE ORAL
Status: ON HOLD | COMMUNITY
Start: 2020-10-06 | End: 2020-10-20

## 2020-10-08 RX ORDER — LISINOPRIL 10 MG/1
TABLET ORAL
Qty: 27 TABLET | Refills: 0 | Status: ON HOLD | OUTPATIENT
Start: 2020-10-08 | End: 2020-10-20

## 2020-10-08 ASSESSMENT — MIFFLIN-ST. JEOR: SCORE: 1968.81

## 2020-10-08 NOTE — NURSING NOTE
"Chief Complaint   Patient presents with     Imm/Inj     initial BP (!) 165/95 (BP Location: Left arm, Cuff Size: Adult Large)   Pulse 88   Temp 98.3  F (36.8  C) (Oral)   Resp 16   Ht 1.702 m (5' 7\")   Wt 122 kg (269 lb)   SpO2 96%   BMI 42.13 kg/m   Estimated body mass index is 42.13 kg/m  as calculated from the following:    Height as of this encounter: 1.702 m (5' 7\").    Weight as of this encounter: 122 kg (269 lb).  BP completed using cuff size: large.  L  arm      Health Maintenance that is potentially due pending provider review:  NONE    n/a    Sung Car ma  "

## 2020-10-08 NOTE — PROGRESS NOTES
"Subjective     Parmjit Hernández is a 65 year old male who presents to clinic today for the following health issues:    HPI         Multiple concerns and vaccines.  For diabetes it has been well controlled but it increased slightly this morning to 209.    Blood pressure has been fluctuating. Sometimes high and sometimes low. But recently has been persistently high.   He has been seeing dermatology for lower extermity swelling, rash. [facility] dermatology  The patient has a history of bipolar 1 disorder.  Desires to start lithium.    Review of Systems   Constitutional, HEENT, cardiovascular, pulmonary, gi and gu systems are negative, except as otherwise noted.      Objective    BP (!) 165/95 (BP Location: Left arm, Cuff Size: Adult Large)   Pulse 88   Temp 98.3  F (36.8  C) (Oral)   Resp 16   Ht 1.702 m (5' 7\")   Wt 122 kg (269 lb)   SpO2 96%   BMI 42.13 kg/m    Body mass index is 42.13 kg/m .  Physical Exam   GENERAL: healthy, alert and no distress  RESP: lungs clear to auscultation - no rales, rhonchi or wheezes  CV: regular rate and rhythm, normal S1 S2, no S3 or S4, no murmur, click or rub, no peripheral edema and peripheral pulses strong  MS: no gross musculoskeletal defects noted, no edema  SKIN: Bilateral lower extremity +1 swelling.  Maceration of the skin in the left lower extremity.    No results found. However, due to the size of the patient record, not all encounters were searched. Please check Results Review for a complete set of results.        Assessment & Plan     1. Type 2 diabetes mellitus without complication, without long-term current use of insulin (H)  - **Basic metabolic panel FUTURE anytime; Future  Will be repeating A1 c in decemebr 2020  Regular exercise     2. Hypertension goal BP (blood pressure) < 140/90  - lisinopril (ZESTRIL) 10 MG tablet; Take 0.5 tablets (5 mg) by mouth daily for 7 days, THEN 1 tablet (10 mg) daily for 23 days.  Dispense: 27 tablet; Refill: 0  - furosemide (LASIX) " 20 MG tablet; Take 1 tablet (20 mg) by mouth daily  Dispense: 30 tablet; Refill: 0  - **Basic metabolic panel FUTURE anytime; Future    3. Need for prophylactic vaccination and inoculation against influenza  - DE FLU VACCINE, INCREASED ANTIGEN, PRESV FREE  - ADMIN 1st VACCINE    4. Acquired lymphedema  - furosemide (LASIX) 20 MG tablet; Take 1 tablet (20 mg) by mouth daily  Dispense: 30 tablet; Refill: 0  - **Basic metabolic panel FUTURE anytime; Future  - continue with follow-up with Dermatology.       Return in about 2 weeks (around 10/22/2020) for BP Recheck with MD.     Vince Henry MD  [facility]

## 2020-10-10 ENCOUNTER — MYC MEDICAL ADVICE (OUTPATIENT)
Dept: FAMILY MEDICINE | Facility: CLINIC | Age: 65
End: 2020-10-10

## 2020-10-10 DIAGNOSIS — F31.9 BIPOLAR I DISORDER (H): Primary | ICD-10-CM

## 2020-10-12 ENCOUNTER — VIRTUAL VISIT (OUTPATIENT)
Dept: FAMILY MEDICINE | Facility: OTHER | Age: 65
End: 2020-10-12

## 2020-10-12 NOTE — PROGRESS NOTES
"Date: 10/12/2020 05:52:04  Clinician: Elif Sellers  Clinician NPI: [id number]  Patient: Parmjit Hernández  Patient : 1955  Patient Address: [address] [place] [address], [place] [address]  Patient Phone: [phone]  Visit Protocol: URI  Patient Summary:  Parmjit is a 65 year old ( : 1955 ) male who initiated a [facility] Visit for COVID-19 (Coronavirus) evaluation and screening. When asked the question \"Please sign me up to receive news, health information and promotions. \", Parmjit responded \"Yes\".    Parmjit states his symptoms started gradually 3-4 days ago. After his symptoms started, they improved and then got worse again.   His symptoms consist of myalgia and malaise.   Parmjit denies having ear pain, headache, wheezing, fever, enlarged lymph nodes, cough, nasal congestion, anosmia, vomiting, rhinitis, nausea, facial pain or pressure, chills, sore throat, teeth pain, ageusia, and diarrhea. He also denies taking antibiotic medication in the past month and having recent facial or sinus surgery in the past 60 days. He is not experiencing dyspnea.   Precipitating events  He has not recently been exposed to someone with influenza. Parmjit has not been in close contact with any high risk individuals.   Pertinent COVID-19 (Coronavirus) information  In the past 14 days, Parmjit has not worked in a congregate living setting.   He does not work or volunteer as healthcare worker or a  and does not work or volunteer in a healthcare facility.   Parmjit also has not lived in a congregate living setting in the past 14 days. He does not live with a healthcare worker.   Parmjit has not had a close contact with a laboratory-confirmed COVID-19 patient within 14 days of symptom onset.   Since 2019, Parmjit and has not had upper respiratory infection or influenza-like illness. Has not been diagnosed with lab-confirmed COVID-19 test   Pertinent medical history  Parmjit does not need a return to " work/school note.   Weight: 255 lbs   Parmjit does not smoke or use smokeless tobacco.   Additional information as reported by the patient (free text): I am very fatigued and am achy.  I had a flu shot 5 days ago and had some symptoms but think they should have worn off.   Weight: 255 lbs    MEDICATIONS: aspirin oral, alprazolam oral, Januvia oral, metoprolol succinate oral, omega3-docosahexanoic acid-epa-other omega3s-fish oil oral, glimepiride oral, furosemide oral, lithium carbonate oral, Vraylar oral, Lyrica oral, atorvastatin oral, Flomax oral, Depakote ER oral, lisinopril oral, modafinil oral, metformin oral, ALLERGIES: Bleph-10  Clinician Response:  Dear Parmjit,   Your symptoms show that you may have coronavirus (COVID-19). This illness can cause fever, cough and trouble breathing. Many people get a mild case and get better on their own. Some people can get very sick.  What should I do?  We would like to test you for this virus.   1. Please call [phone] to schedule your visit. Explain that you were referred by [facility] to have a COVID-19 test. Be ready to share [facility] number.  The following will serve as your written order for this COVID Test, ordered by me, for the indication of suspected COVID [Z20.828]: The test will be ordered in [employer], our electronic health record, after you are scheduled. It will show as ordered and authorized by Ambrose Olivera MD.  Order: COVID-19 (Coronavirus) PCR for SYMPTOMATIC testing from [facility].      2. When it's time for your COVID test:  Stay at least 6 feet away from others. (If someone will drive you to your test, stay in the backseat, as far away from the  as you can.)   Cover your mouth and nose with a mask, tissue or washcloth.  Go straight to the testing site. Don't make any stops on the way there or back.      3.Starting now: Stay home and away from others (self-isolate) until:   You've had no fever---and no medicine that reduces fever---for one full  "day (24 hours). And...   Your other symptoms have gotten better. For example, your cough or breathing has improved. And...   At least 10 days have passed since your symptoms started.       During this time, don't leave the house except for testing or medical care.   Stay in your own room, even for meals. Use your own bathroom if you can.   Stay away from others in your home. No hugging, kissing or shaking hands. No visitors.  Don't go to work, school or anywhere else.    Clean \"high touch\" surfaces often (doorknobs, counters, handles, etc.). Use a household cleaning spray or wipes. You'll find a full list of  on the EPA website: www.epa.gov/pesticide-registration/list-n-disinfectants-use-against-sars-cov-2.   Cover your mouth and nose with a mask, tissue or washcloth to avoid spreading germs.  Wash your hands and face often. Use soap and water.  Caregivers in these groups are at risk for severe illness due to COVID-19:  o People 65 years and older  o People who live in a nursing home or long-term care facility  o People with chronic disease (lung, heart, cancer, diabetes, kidney, liver, immunologic)  o People who have a weakened immune system, including those who:   Are in cancer treatment  Take medicine that weakens the immune system, such as corticosteroids  Had a bone marrow or organ transplant  Have an immune deficiency  Have poorly controlled HIV or AIDS  Are obese (body mass index of 40 or higher)  Smoke regularly   o Caregivers should wear gloves while washing dishes, handling laundry and cleaning bedrooms and bathrooms.  o Use caution when washing and drying laundry: Don't shake dirty laundry, and use the warmest water setting that you can.  o For more tips, go to www.cdc.gov/coronavirus/2019-ncov/downloads/10Things.pdf.    4.Sign up for [employer]. We know it's scary to hear that you might have COVID-19. We want to track your symptoms to make sure you're okay over the next 2 weeks. Please look for " an email from [employer]---this is a free, online program that we'll use to keep in touch. To sign up, follow the link in the email. Learn more at [email or web address]  How can I take care of myself?   Get lots of rest. Drink extra fluids (unless a doctor has told you not to).   Take Tylenol (acetaminophen) for fever or pain. If you have liver or kidney problems, ask your family doctor if it's okay to take Tylenol.   Adults can take either:    650 mg (two 325 mg pills) every 4 to 6 hours, or...   1,000 mg (two 500 mg pills) every 8 hours as needed.    Note: Don't take more than 3,000 mg in one day. Acetaminophen is found in many medicines (both prescribed and over-the-counter medicines). Read all labels to be sure you don't take too much.   For children, check the Tylenol bottle for the right dose. The dose is based on the child's age or weight.    If you have other health problems (like cancer, heart failure, an organ transplant or severe kidney disease): Call your specialty clinic if you don't feel better in the next 2 days.       Know when to call 911. Emergency warning signs include:    Trouble breathing or shortness of breath Pain or pressure in the chest that doesn't go away Feeling confused like you haven't felt before, or not being able to wake up Bluish-colored lips or face.  Where can I get more information?   [facility] -- About COVID-19: [email or web address]   CDC -- What to Do If You're Sick: www.cdc.gov/coronavirus/2019-ncov/about/steps-when-sick.html   CDC -- Ending Home Isolation: www.cdc.gov/coronavirus/2019-ncov/hcp/disposition-in-home-patients.html   CDC -- Caring for Someone: www.cdc.gov/coronavirus/2019-ncov/if-you-are-sick/care-for-someone.html   [facility] -- Interim Guidance for Hospital Discharge to Home: www.health.[facility].[place]/diseases/coronavirus/hcp/hospdischarge.pdf   [facility] clinical trials (COVID-19 research studies):  clinicalaffairs.[facility]-clinical-trials    Below are the COVID-19 hotlines at the [place] Department of Health ([facility]). Interpreters are available.    For health questions: Call 258-032-1116 or 1-367.436.9756 (7 a.m. to 7 p.m.) For questions about schools and childcare: Call 328-118-0059 or 1-748.251.3933 (7 a.m. to 7 p.m.)    Diagnosis: Myalgia, unspecified site  Diagnosis ICD: M79.10

## 2020-10-13 DIAGNOSIS — F31.9 BIPOLAR I DISORDER (H): ICD-10-CM

## 2020-10-13 DIAGNOSIS — I10 HYPERTENSION GOAL BP (BLOOD PRESSURE) < 140/90: ICD-10-CM

## 2020-10-13 DIAGNOSIS — E11.9 TYPE 2 DIABETES MELLITUS WITHOUT COMPLICATION, WITHOUT LONG-TERM CURRENT USE OF INSULIN (H): ICD-10-CM

## 2020-10-13 DIAGNOSIS — I89.0 ACQUIRED LYMPHEDEMA: ICD-10-CM

## 2020-10-13 LAB
ALBUMIN SERPL-MCNC: 3.5 G/DL (ref 3.4–5)
ALP SERPL-CCNC: 94 U/L (ref 40–150)
ALT SERPL W P-5'-P-CCNC: 57 U/L (ref 0–70)
ANION GAP SERPL CALCULATED.3IONS-SCNC: 11 MMOL/L (ref 3–14)
AST SERPL W P-5'-P-CCNC: 23 U/L (ref 0–45)
BILIRUB SERPL-MCNC: 0.4 MG/DL (ref 0.2–1.3)
BUN SERPL-MCNC: 33 MG/DL (ref 7–30)
CALCIUM SERPL-MCNC: 9.5 MG/DL (ref 8.5–10.1)
CHLORIDE SERPL-SCNC: 109 MMOL/L (ref 94–109)
CO2 SERPL-SCNC: 19 MMOL/L (ref 20–32)
CREAT SERPL-MCNC: 1.23 MG/DL (ref 0.66–1.25)
GFR SERPL CREATININE-BSD FRML MDRD: 62 ML/MIN/{1.73_M2}
GLUCOSE SERPL-MCNC: 237 MG/DL (ref 70–99)
LITHIUM SERPL-SCNC: 0.21 MMOL/L (ref 0.6–1.2)
POTASSIUM SERPL-SCNC: 4 MMOL/L (ref 3.4–5.3)
PROT SERPL-MCNC: 6.8 G/DL (ref 6.8–8.8)
SODIUM SERPL-SCNC: 139 MMOL/L (ref 133–144)
TSH SERPL DL<=0.005 MIU/L-ACNC: 2.49 MU/L (ref 0.4–4)

## 2020-10-13 PROCEDURE — 84443 ASSAY THYROID STIM HORMONE: CPT | Performed by: FAMILY MEDICINE

## 2020-10-13 PROCEDURE — 80178 ASSAY OF LITHIUM: CPT | Performed by: FAMILY MEDICINE

## 2020-10-13 PROCEDURE — 80053 COMPREHEN METABOLIC PANEL: CPT | Performed by: FAMILY MEDICINE

## 2020-10-14 ENCOUNTER — OFFICE VISIT (OUTPATIENT)
Dept: FAMILY MEDICINE | Facility: CLINIC | Age: 65
End: 2020-10-14
Payer: COMMERCIAL

## 2020-10-14 VITALS
TEMPERATURE: 98.4 F | DIASTOLIC BLOOD PRESSURE: 82 MMHG | HEIGHT: 67 IN | RESPIRATION RATE: 15 BRPM | WEIGHT: 269.7 LBS | HEART RATE: 85 BPM | SYSTOLIC BLOOD PRESSURE: 125 MMHG | OXYGEN SATURATION: 96 % | BODY MASS INDEX: 42.33 KG/M2

## 2020-10-14 DIAGNOSIS — G93.32 CHRONIC FATIGUE SYNDROME: Primary | ICD-10-CM

## 2020-10-14 PROCEDURE — 99214 OFFICE O/P EST MOD 30 MIN: CPT | Performed by: FAMILY MEDICINE

## 2020-10-14 ASSESSMENT — MIFFLIN-ST. JEOR: SCORE: 1971.98

## 2020-10-14 NOTE — PROGRESS NOTES
"Subjective     Parmjit Hernández is a 65 year old male who presents to clinic today for the following health issues:    HPI         Patient has been experiencing bouts of extreme fatigue- recently stopped taking Lithium (3 days ago) and is wondering if that had something to do with it). Feels weak and tired.   He checked his blood glucose it was 170.   Blood pressure improved after starting medication.  He believes that it might be related to lithium.   Started to feel unwell 3 days after starting the lithium.   Took the caffeine 400 mg without significant improvement.   His modafinil was decreased from 400 mg to 200 mg 1 week ago.   Other than fatigue, he denies any chest pain or shortness of breath.  Denies any rashes.  Denies any palpitations.  Denies any dizziness.    Using the timalol drops with triple ointment for also in the right lower extremity.      Review of Systems   Constitutional, HEENT, cardiovascular, pulmonary, gi and gu systems are negative, except as otherwise noted.      Objective    /82   Pulse 85   Temp 98.4  F (36.9  C) (Tympanic)   Resp 15   Ht 1.702 m (5' 7\")   Wt 122.3 kg (269 lb 11.2 oz)   SpO2 96%   BMI 42.24 kg/m    Body mass index is 42.24 kg/m .  Physical Exam   GENERAL: healthy, alert and no distress  RESP: lungs clear to auscultation - no rales, rhonchi or wheezes  CV: regular rate and rhythm, normal S1 S2, no S3 or S4, no murmur, click or rub, no peripheral edema and peripheral pulses strong  NEURO: Normal strength and tone, mentation intact and speech normal    No results found for any visits on 10/14/20.        Assessment & Plan     Chronic fatigue syndrome  History and physical exam are unremarkable.  He denies any cardiac or pulmonary symptoms.  Denies any musculoskeletal symptoms.  The patient had a comprehensive metabolic panel and thyroid evaluation yesterday and both of them were unremarkable.  Patient's medical history significant for chronic fatigue syndrome.  He " is currently taking Provigil.  He was advised to speak with his psychiatrist/sleep medicine regarding recently dose of Provigil.  He was advised to return to clinic if he notices any new symptoms.      Return in about 2 weeks (around 10/28/2020).    Vince Henry MD  [facility]

## 2020-10-14 NOTE — NURSING NOTE
"Chief Complaint   Patient presents with     Fatigue     Patient has been having extreme bouts of fatigue     Ht 1.702 m (5' 7\")   Wt 122.3 kg (269 lb 11.2 oz)   BMI 42.24 kg/m   Estimated body mass index is 42.24 kg/m  as calculated from the following:    Height as of this encounter: 1.702 m (5' 7\").    Weight as of this encounter: 122.3 kg (269 lb 11.2 oz).  Medication Reconciliation: complete        Health Maintenance Due Pending Provider Review:  NONE    n/a    Shante Shaikh MA  [facility]  [phone]  "

## 2020-10-19 ENCOUNTER — NURSE TRIAGE (OUTPATIENT)
Dept: NURSING | Facility: CLINIC | Age: 65
End: 2020-10-19

## 2020-10-19 ENCOUNTER — HOSPITAL ENCOUNTER (OUTPATIENT)
Facility: CLINIC | Age: 65
Setting detail: OBSERVATION
Discharge: HOME | End: 2020-10-21
Attending: EMERGENCY MEDICINE | Admitting: INTERNAL MEDICINE
Payer: COMMERCIAL

## 2020-10-19 ENCOUNTER — TRANSFERRED RECORDS (OUTPATIENT)
Dept: HEALTH INFORMATION MANAGEMENT | Facility: CLINIC | Age: 65
End: 2020-10-19

## 2020-10-19 DIAGNOSIS — R10.9 CHRONIC ABDOMINAL PAIN: ICD-10-CM

## 2020-10-19 DIAGNOSIS — R29.898 BILATERAL LEG WEAKNESS: ICD-10-CM

## 2020-10-19 DIAGNOSIS — M62.81 GENERALIZED MUSCLE WEAKNESS: ICD-10-CM

## 2020-10-19 DIAGNOSIS — G89.29 CHRONIC ABDOMINAL PAIN: ICD-10-CM

## 2020-10-19 LAB
ALBUMIN SERPL-MCNC: 3.5 G/DL (ref 3.4–5)
ALP SERPL-CCNC: 85 U/L (ref 40–150)
ALT SERPL W P-5'-P-CCNC: 38 U/L (ref 0–70)
ANION GAP SERPL CALCULATED.3IONS-SCNC: 6 MMOL/L (ref 3–14)
AST SERPL W P-5'-P-CCNC: 14 U/L (ref 0–45)
BASOPHILS # BLD AUTO: 0.1 10E9/L (ref 0–0.2)
BASOPHILS NFR BLD AUTO: 0.7 %
BILIRUB SERPL-MCNC: 0.4 MG/DL (ref 0.2–1.3)
BUN SERPL-MCNC: 24 MG/DL (ref 7–30)
CALCIUM SERPL-MCNC: 8.7 MG/DL (ref 8.5–10.1)
CHLORIDE SERPL-SCNC: 104 MMOL/L (ref 94–109)
CO2 SERPL-SCNC: 25 MMOL/L (ref 20–32)
CREAT SERPL-MCNC: 1.05 MG/DL (ref 0.66–1.25)
CRP SERPL-MCNC: 5.2 MG/L (ref 0–8)
DIFFERENTIAL METHOD BLD: ABNORMAL
EOSINOPHIL # BLD AUTO: 0.3 10E9/L (ref 0–0.7)
EOSINOPHIL NFR BLD AUTO: 4.5 %
ERYTHROCYTE [DISTWIDTH] IN BLOOD BY AUTOMATED COUNT: 12.9 % (ref 10–15)
GFR SERPL CREATININE-BSD FRML MDRD: 75 ML/MIN/{1.73_M2}
GLUCOSE SERPL-MCNC: 96 MG/DL (ref 70–99)
HCT VFR BLD AUTO: 37.3 % (ref 40–53)
HGB BLD-MCNC: 12.8 G/DL (ref 13.3–17.7)
IMM GRANULOCYTES # BLD: 0 10E9/L (ref 0–0.4)
IMM GRANULOCYTES NFR BLD: 0.3 %
INTERPRETATION ECG - MUSE: NORMAL
LYMPHOCYTES # BLD AUTO: 1 10E9/L (ref 0.8–5.3)
LYMPHOCYTES NFR BLD AUTO: 14.7 %
MCH RBC QN AUTO: 31.6 PG (ref 26.5–33)
MCHC RBC AUTO-ENTMCNC: 34.3 G/DL (ref 31.5–36.5)
MCV RBC AUTO: 92 FL (ref 78–100)
MONOCYTES # BLD AUTO: 0.4 10E9/L (ref 0–1.3)
MONOCYTES NFR BLD AUTO: 6.3 %
NEUTROPHILS # BLD AUTO: 4.9 10E9/L (ref 1.6–8.3)
NEUTROPHILS NFR BLD AUTO: 73.5 %
NRBC # BLD AUTO: 0 10*3/UL
NRBC BLD AUTO-RTO: 0 /100
PLATELET # BLD AUTO: 138 10E9/L (ref 150–450)
POTASSIUM SERPL-SCNC: 3.9 MMOL/L (ref 3.4–5.3)
PROT SERPL-MCNC: 6.4 G/DL (ref 6.8–8.8)
RBC # BLD AUTO: 4.05 10E12/L (ref 4.4–5.9)
SODIUM SERPL-SCNC: 135 MMOL/L (ref 133–144)
TROPONIN I SERPL-MCNC: 0.06 UG/L (ref 0–0.04)
WBC # BLD AUTO: 6.7 10E9/L (ref 4–11)

## 2020-10-19 PROCEDURE — 93005 ELECTROCARDIOGRAM TRACING: CPT

## 2020-10-19 PROCEDURE — 84484 ASSAY OF TROPONIN QUANT: CPT | Performed by: EMERGENCY MEDICINE

## 2020-10-19 PROCEDURE — 86140 C-REACTIVE PROTEIN: CPT | Performed by: EMERGENCY MEDICINE

## 2020-10-19 PROCEDURE — 36415 COLL VENOUS BLD VENIPUNCTURE: CPT | Performed by: PSYCHIATRY & NEUROLOGY

## 2020-10-19 PROCEDURE — 80053 COMPREHEN METABOLIC PANEL: CPT | Performed by: EMERGENCY MEDICINE

## 2020-10-19 PROCEDURE — 84443 ASSAY THYROID STIM HORMONE: CPT | Performed by: EMERGENCY MEDICINE

## 2020-10-19 PROCEDURE — 85025 COMPLETE CBC W/AUTO DIFF WBC: CPT | Performed by: EMERGENCY MEDICINE

## 2020-10-19 PROCEDURE — 99285 EMERGENCY DEPT VISIT HI MDM: CPT | Mod: 25

## 2020-10-19 ASSESSMENT — ENCOUNTER SYMPTOMS
LIGHT-HEADEDNESS: 0
COUGH: 0
NAUSEA: 1
WEAKNESS: 1
CHILLS: 0
FEVER: 0
SHORTNESS OF BREATH: 0
DIZZINESS: 0
DIAPHORESIS: 0
DIFFICULTY URINATING: 1

## 2020-10-19 ASSESSMENT — MIFFLIN-ST. JEOR: SCORE: 1973.34

## 2020-10-19 NOTE — TELEPHONE ENCOUNTER
Pt is calling in about his BP being up today. Pt reports BP was up to 174/101 at 430 pm. Pt has been feeling very weak and tired lately. Pt denies chest pain, shortness of breath, headache, or any numbness, weakness, or loss of sensation of the face, arm, or leg on one side of the body. Discussed eating more protein, and pt reports his dietician suggested protien drinks and he did not take one today. Pt will go rest and take a protein drink and recheck his BP in an hour, if it is still up he will call back. Pt is also due to take his BP medication tonight.  Pt was advised to call if BP is over 180/110.   Care advice given, and per protocol pt should be evaluated within 3 days in the clinic. Pt does have a BP recheck appointment scheduled for Thursday. Pt was also advised to call back if he develops any chest pain, shortness of breath, numbness or weakness of the face, arm, or leg on one side of the body, or if BP is over 180/110. Pt verbalized understanding.      Kalia Bianchi RN on 10/19/2020 at 5:34 PM    Additional Information    Negative: Difficult to awaken or acting confused (e.g., disoriented, slurred speech)    Negative: Severe difficulty breathing (e.g., struggling for each breath, speaks in single words)    Negative: [1] Weakness of the face, arm or leg on one side of the body AND [2] new onset    Negative: [1] Numbness (i.e., loss of sensation) of the face, arm or leg on one side of the body AND [2] new onset    Negative: [1] Chest pain lasts > 5 minutes AND [2] history of heart disease  (i.e., heart attack, bypass surgery, angina, angioplasty, CHF)    Negative: [1] Chest pain AND [2] took nitrogylcerin AND [3] pain was not relieved    Negative: Sounds like a life-threatening emergency to the triager    Negative: Symptom is main concern  (e.g., headache, chest pain)    Negative: Low blood pressure is main concern    Negative: [1] Systolic BP  >= 160 OR Diastolic >= 100 AND [2] cardiac or neurologic  symptoms (e.g., chest pain, difficulty breathing, unsteady gait, blurred vision)    Negative: [1] Pregnant > 20 weeks (or postpartum < 6 weeks) AND [2] new hand or face swelling    Negative: [1] Pregnant > 20 weeks AND [2] BP Systolic BP  >= 140 OR Diastolic >= 90    Negative: [1] Systolic BP  >= 200 OR Diastolic >= 120  AND [2] having NO cardiac or neurologic symptoms    Negative: [1] Postpartum < 6 weeks AND [2] BP Systolic BP  >= 140 OR Diastolic >= 90    Negative: [1] Systolic BP  >= 180 OR Diastolic >= 110 AND [2] missed most recent dose of blood pressure medication    Negative: Systolic BP  >= 180 OR Diastolic >= 110    Negative: Ran out of BP medications    Systolic BP  >= 160 OR Diastolic >= 100    Protocols used: HIGH BLOOD PRESSURE-A-AH

## 2020-10-20 ENCOUNTER — TELEPHONE (OUTPATIENT)
Dept: FAMILY MEDICINE | Facility: CLINIC | Age: 65
End: 2020-10-20

## 2020-10-20 ENCOUNTER — APPOINTMENT (OUTPATIENT)
Dept: PHYSICAL THERAPY | Facility: CLINIC | Age: 65
End: 2020-10-20
Attending: INTERNAL MEDICINE
Payer: COMMERCIAL

## 2020-10-20 ENCOUNTER — APPOINTMENT (OUTPATIENT)
Dept: MRI IMAGING | Facility: CLINIC | Age: 65
End: 2020-10-20
Attending: PSYCHIATRY & NEUROLOGY
Payer: COMMERCIAL

## 2020-10-20 ENCOUNTER — APPOINTMENT (OUTPATIENT)
Dept: GENERAL RADIOLOGY | Facility: CLINIC | Age: 65
End: 2020-10-20
Attending: EMERGENCY MEDICINE
Payer: COMMERCIAL

## 2020-10-20 DIAGNOSIS — G62.9 PERIPHERAL POLYNEUROPATHY: ICD-10-CM

## 2020-10-20 DIAGNOSIS — G63 POLYNEUROPATHY ASSOCIATED WITH UNDERLYING DISEASE (H): ICD-10-CM

## 2020-10-20 PROBLEM — R29.898 BILATERAL LEG WEAKNESS: Status: ACTIVE | Noted: 2020-10-20

## 2020-10-20 LAB
ALBUMIN UR-MCNC: NEGATIVE MG/DL
APPEARANCE UR: CLEAR
BILIRUB UR QL STRIP: NEGATIVE
CK SERPL-CCNC: 89 U/L (ref 30–300)
COLOR UR AUTO: YELLOW
GLUCOSE BLDC GLUCOMTR-MCNC: 100 MG/DL (ref 70–99)
GLUCOSE UR STRIP-MCNC: NEGATIVE MG/DL
HGB UR QL STRIP: NEGATIVE
KETONES UR STRIP-MCNC: NEGATIVE MG/DL
LEUKOCYTE ESTERASE UR QL STRIP: NEGATIVE
LITHIUM SERPL-SCNC: <0.2 MMOL/L (ref 0.6–1.2)
MUCOUS THREADS #/AREA URNS LPF: PRESENT /LPF
NITRATE UR QL: NEGATIVE
PH UR STRIP: 5.5 PH (ref 5–7)
RBC #/AREA URNS AUTO: <1 /HPF (ref 0–2)
SARS-COV-2 RNA SPEC QL NAA+PROBE: NOT DETECTED
SOURCE: ABNORMAL
SP GR UR STRIP: 1.02 (ref 1–1.03)
SPECIMEN SOURCE: NORMAL
SQUAMOUS #/AREA URNS AUTO: <1 /HPF (ref 0–1)
TROPONIN I SERPL-MCNC: 0.05 UG/L (ref 0–0.04)
TSH SERPL DL<=0.005 MIU/L-ACNC: 3 MU/L (ref 0.4–4)
UROBILINOGEN UR STRIP-MCNC: NORMAL MG/DL (ref 0–2)
VALPROATE SERPL-MCNC: 14 MG/L (ref 50–100)
WBC #/AREA URNS AUTO: <1 /HPF (ref 0–5)

## 2020-10-20 PROCEDURE — 94150 VITAL CAPACITY TEST: CPT

## 2020-10-20 PROCEDURE — 80366 DRUG SCREENING PREGABALIN: CPT | Performed by: PSYCHIATRY & NEUROLOGY

## 2020-10-20 PROCEDURE — 84484 ASSAY OF TROPONIN QUANT: CPT | Performed by: EMERGENCY MEDICINE

## 2020-10-20 PROCEDURE — 99220 PR INITIAL OBSERVATION CARE,LEVEL III: CPT | Performed by: INTERNAL MEDICINE

## 2020-10-20 PROCEDURE — 83519 RIA NONANTIBODY: CPT | Performed by: PSYCHIATRY & NEUROLOGY

## 2020-10-20 PROCEDURE — 81001 URINALYSIS AUTO W/SCOPE: CPT | Performed by: EMERGENCY MEDICINE

## 2020-10-20 PROCEDURE — 255N000002 HC RX 255 OP 636: Performed by: INTERNAL MEDICINE

## 2020-10-20 PROCEDURE — 82550 ASSAY OF CK (CPK): CPT | Performed by: EMERGENCY MEDICINE

## 2020-10-20 PROCEDURE — 250N000013 HC RX MED GY IP 250 OP 250 PS 637: Performed by: INTERNAL MEDICINE

## 2020-10-20 PROCEDURE — 96375 TX/PRO/DX INJ NEW DRUG ADDON: CPT

## 2020-10-20 PROCEDURE — 36415 COLL VENOUS BLD VENIPUNCTURE: CPT | Performed by: PSYCHIATRY & NEUROLOGY

## 2020-10-20 PROCEDURE — 250N000011 HC RX IP 250 OP 636: Performed by: INTERNAL MEDICINE

## 2020-10-20 PROCEDURE — 999N001017 HC STATISTIC GLUCOSE BY METER IP

## 2020-10-20 PROCEDURE — G0378 HOSPITAL OBSERVATION PER HR: HCPCS

## 2020-10-20 PROCEDURE — 80178 ASSAY OF LITHIUM: CPT | Performed by: PSYCHIATRY & NEUROLOGY

## 2020-10-20 PROCEDURE — 96376 TX/PRO/DX INJ SAME DRUG ADON: CPT | Mod: 59

## 2020-10-20 PROCEDURE — 97161 PT EVAL LOW COMPLEX 20 MIN: CPT | Mod: GP

## 2020-10-20 PROCEDURE — 72158 MRI LUMBAR SPINE W/O & W/DYE: CPT

## 2020-10-20 PROCEDURE — A9585 GADOBUTROL INJECTION: HCPCS | Performed by: INTERNAL MEDICINE

## 2020-10-20 PROCEDURE — 250N000013 HC RX MED GY IP 250 OP 250 PS 637: Performed by: EMERGENCY MEDICINE

## 2020-10-20 PROCEDURE — 250N000011 HC RX IP 250 OP 636: Performed by: PSYCHIATRY & NEUROLOGY

## 2020-10-20 PROCEDURE — U0003 INFECTIOUS AGENT DETECTION BY NUCLEIC ACID (DNA OR RNA); SEVERE ACUTE RESPIRATORY SYNDROME CORONAVIRUS 2 (SARS-COV-2) (CORONAVIRUS DISEASE [COVID-19]), AMPLIFIED PROBE TECHNIQUE, MAKING USE OF HIGH THROUGHPUT TECHNOLOGIES AS DESCRIBED BY CMS-2020-01-R: HCPCS | Performed by: INTERNAL MEDICINE

## 2020-10-20 PROCEDURE — 999N000157 HC STATISTIC RCP TIME EA 10 MIN

## 2020-10-20 PROCEDURE — 80164 ASSAY DIPROPYLACETIC ACD TOT: CPT | Performed by: PSYCHIATRY & NEUROLOGY

## 2020-10-20 PROCEDURE — 250N000011 HC RX IP 250 OP 636: Performed by: EMERGENCY MEDICINE

## 2020-10-20 PROCEDURE — 96374 THER/PROPH/DIAG INJ IV PUSH: CPT | Mod: 59

## 2020-10-20 PROCEDURE — 71046 X-RAY EXAM CHEST 2 VIEWS: CPT

## 2020-10-20 RX ORDER — ONDANSETRON 4 MG/1
4 TABLET, ORALLY DISINTEGRATING ORAL EVERY 6 HOURS PRN
Status: DISCONTINUED | OUTPATIENT
Start: 2020-10-20 | End: 2020-10-21 | Stop reason: HOSPADM

## 2020-10-20 RX ORDER — LORAZEPAM 2 MG/ML
2 INJECTION INTRAMUSCULAR
Status: DISCONTINUED | OUTPATIENT
Start: 2020-10-20 | End: 2020-10-21 | Stop reason: HOSPADM

## 2020-10-20 RX ORDER — METOPROLOL SUCCINATE 50 MG/1
50 TABLET, EXTENDED RELEASE ORAL DAILY
Status: DISCONTINUED | OUTPATIENT
Start: 2020-10-20 | End: 2020-10-21 | Stop reason: HOSPADM

## 2020-10-20 RX ORDER — TAMSULOSIN HYDROCHLORIDE 0.4 MG/1
0.4 CAPSULE ORAL 2 TIMES DAILY
Status: DISCONTINUED | OUTPATIENT
Start: 2020-10-20 | End: 2020-10-21 | Stop reason: HOSPADM

## 2020-10-20 RX ORDER — ASPIRIN 81 MG/1
162 TABLET, CHEWABLE ORAL ONCE
Status: DISCONTINUED | OUTPATIENT
Start: 2020-10-20 | End: 2020-10-21 | Stop reason: CLARIF

## 2020-10-20 RX ORDER — ALPRAZOLAM 0.5 MG
0.5 TABLET ORAL AT BEDTIME
COMMUNITY
End: 2023-02-23

## 2020-10-20 RX ORDER — ACETAMINOPHEN 325 MG/1
975 TABLET ORAL EVERY 6 HOURS PRN
Status: DISCONTINUED | OUTPATIENT
Start: 2020-10-20 | End: 2020-10-21 | Stop reason: HOSPADM

## 2020-10-20 RX ORDER — GADOBUTROL 604.72 MG/ML
12 INJECTION INTRAVENOUS ONCE
Status: COMPLETED | OUTPATIENT
Start: 2020-10-20 | End: 2020-10-20

## 2020-10-20 RX ORDER — ACETAMINOPHEN 650 MG/1
650 SUPPOSITORY RECTAL EVERY 4 HOURS PRN
Status: DISCONTINUED | OUTPATIENT
Start: 2020-10-20 | End: 2020-10-20

## 2020-10-20 RX ORDER — FUROSEMIDE 20 MG
20 TABLET ORAL DAILY
Status: DISCONTINUED | OUTPATIENT
Start: 2020-10-20 | End: 2020-10-21 | Stop reason: HOSPADM

## 2020-10-20 RX ORDER — NALOXONE HYDROCHLORIDE 0.4 MG/ML
.1-.4 INJECTION, SOLUTION INTRAMUSCULAR; INTRAVENOUS; SUBCUTANEOUS
Status: DISCONTINUED | OUTPATIENT
Start: 2020-10-20 | End: 2020-10-21 | Stop reason: HOSPADM

## 2020-10-20 RX ORDER — PREGABALIN 25 MG/1
100 CAPSULE ORAL 3 TIMES DAILY
Status: DISCONTINUED | OUTPATIENT
Start: 2020-10-20 | End: 2020-10-21 | Stop reason: HOSPADM

## 2020-10-20 RX ORDER — LISINOPRIL 10 MG/1
10 TABLET ORAL DAILY
Status: DISCONTINUED | OUTPATIENT
Start: 2020-10-20 | End: 2020-10-21 | Stop reason: HOSPADM

## 2020-10-20 RX ORDER — LISINOPRIL 10 MG/1
10 TABLET ORAL DAILY
COMMUNITY
End: 2020-11-11

## 2020-10-20 RX ORDER — ACETAMINOPHEN 325 MG/1
650 TABLET ORAL EVERY 4 HOURS PRN
Status: DISCONTINUED | OUTPATIENT
Start: 2020-10-20 | End: 2020-10-20

## 2020-10-20 RX ORDER — ALPRAZOLAM 0.25 MG
0.5 TABLET ORAL AT BEDTIME
Status: DISCONTINUED | OUTPATIENT
Start: 2020-10-20 | End: 2020-10-21 | Stop reason: HOSPADM

## 2020-10-20 RX ORDER — ONDANSETRON 2 MG/ML
4 INJECTION INTRAMUSCULAR; INTRAVENOUS EVERY 6 HOURS PRN
Status: DISCONTINUED | OUTPATIENT
Start: 2020-10-20 | End: 2020-10-21 | Stop reason: HOSPADM

## 2020-10-20 RX ORDER — HYDROMORPHONE HYDROCHLORIDE 1 MG/ML
0.5 INJECTION, SOLUTION INTRAMUSCULAR; INTRAVENOUS; SUBCUTANEOUS ONCE
Status: COMPLETED | OUTPATIENT
Start: 2020-10-20 | End: 2020-10-20

## 2020-10-20 RX ADMIN — TAMSULOSIN HYDROCHLORIDE 0.4 MG: 0.4 CAPSULE ORAL at 21:16

## 2020-10-20 RX ADMIN — PREGABALIN 100 MG: 25 CAPSULE ORAL at 21:15

## 2020-10-20 RX ADMIN — ACETAMINOPHEN 975 MG: 325 TABLET, FILM COATED ORAL at 12:21

## 2020-10-20 RX ADMIN — LORAZEPAM 2 MG: 2 INJECTION INTRAMUSCULAR; INTRAVENOUS at 14:55

## 2020-10-20 RX ADMIN — GADOBUTROL 12 ML: 604.72 INJECTION INTRAVENOUS at 15:51

## 2020-10-20 RX ADMIN — PREGABALIN 100 MG: 25 CAPSULE ORAL at 16:57

## 2020-10-20 RX ADMIN — DIVALPROEX SODIUM 750 MG: 250 TABLET, DELAYED RELEASE ORAL at 21:15

## 2020-10-20 RX ADMIN — HYDROMORPHONE HYDROCHLORIDE 0.5 MG: 1 INJECTION, SOLUTION INTRAMUSCULAR; INTRAVENOUS; SUBCUTANEOUS at 03:40

## 2020-10-20 RX ADMIN — HYDROMORPHONE HYDROCHLORIDE 0.5 MG: 1 INJECTION, SOLUTION INTRAMUSCULAR; INTRAVENOUS; SUBCUTANEOUS at 07:56

## 2020-10-20 RX ADMIN — ALPRAZOLAM 0.5 MG: 0.25 TABLET ORAL at 21:16

## 2020-10-20 NOTE — PROGRESS NOTES
"Update Note:     Patient seen and examined.  No acute events.  Continues to complain of \"dizziness\" that on further questioning sounds more like weakness.  Also complaining of chronic abdominal pain and headaches.     Plan:  - Awaiting neurology evaluation   - PT to assess.  Patient is okay with TCU if recommended  - Had a long discussion with him that we would not be using any additional opioids for his chronic pain (he is already on a fentanyl pump) and he voiced understanding  - Increased Tylenol to 975 mg at patient's request       Michael Duval DO   Hospitalist   "

## 2020-10-20 NOTE — ED NOTES
Bed: ED19  Expected date: 10/19/20  Expected time: 10:54 PM  Means of arrival:   Comments:  730  65M Weakness  [id number]

## 2020-10-20 NOTE — PROVIDER NOTIFICATION
MD Notification    Notified Person: MD    Notified Person Name: Cindy    Notification Date/Time: 10/20/20, 6:30    Notification Interaction: text page    Purpose of Notification: pt requesting dilaudid, pt states told in ED that he would receive another dose once on unit.    Orders Received: dilaudid 0.5mg IV ONCE    Comments:

## 2020-10-20 NOTE — PROGRESS NOTES
"Pt refusing bed alarm bc it he states \"it scares me\". Pt instructed to call when he gets up but pt continues to ambulate himself to bathroom.   "

## 2020-10-20 NOTE — H&P
[facility]  History and Physical  Hospitalist       Date of Admission:  10/19/2020    Assessment & Plan   Parmjit Hernández is a 65 year old male with multiple chronic medical problems including type 2 diabetes mellitus with peripheral neuropathy, morbid obesity, restless legs syndrome, bipolar type I, depression, anxiety, hypertension, dyslipidemia, h/o NSTEMI, stasis dermatitis and lymphedema bilateral legs, chronic back pain with implanted pump and continuous opioid dependence, chronic abdominal pain, obstructive sleep apnea, chronic fatigue syndrome, BPH, history of migraines who was admitted on 10/19/2020 with complaint of leg weakness and inability to walk.    Leg weakness, generalized fatigue  Peripheral neuropathy  Polyneuropathy  Stasis dermatitis and lymphedema bilateral legs  Physical deconditioning  Morbid Obesity  Restless Legs Syndrome  Patient reports he can't walk but he has no focal deficits, no demonstrated weakness on exam. Multiple visits to doctors over the past several weeks for similar complaints.  Suspect multifactorial etiology including the peripheral neuropathy associated with diabetes his lymphedema, morbid obesity and generalized deconditioning.  -admit to observation for neurology consultation  -defer any imaging for now given this appears a chronic problem  -resume PTA lyrica  -recommend outpatient physical therapy for gait and balance training, strengthening exercises    Anxiety  Depression  Bipolar Disorder  Managed PTA with cariprazine, depakote, modafinil, caffeinePreviously took lithium which was recently discontinued.   -continue PTA psych meds once verified    Hypertension  Dyslipidemia  Managed PTA with aspirin, atorvastatin, lisinopril, lasix.  -resume above once doses verified    Diabetes, type 2 with peripheral neuropathy  Managed PTA with metformin, glimepiride, sitagliptin. Recent hemoglobin A1c 5.5% from June 2020 indicating reasonable glycemic  "control.   -resume PTA regimen once verified    Chronic mild troponin elevation  Noted. Repeated troponins here at 0.055 and 0.053. No chest pain or shortness of breath. Had recent cardiac MRI August 2020 with LVEF 66%, no WMA, normal global systolic function.     Chronic, stable problems:  Chronic back pain, pain pump, continuous opioid dependence  Chronic abdominal pain  Obstructive Sleep Apnea: Hold PTA modafinil  Chronic fatigue syndrome: outpatient follow up  BPH: Hold flomax for now  H/o migraines    Asymptomatic Rule Out of COVID-19 infection  This patient was evaluated during a global COVID-19 pandemic, which necessitated consideration that the patient might be at risk for infection with the SARS-CoV-2 virus that causes COVID-19. Applicable protocols for evaluation were followed during the patient's care. Low suspicion for infection. Re yellow cent negative testing on 8/17/20, also negative on 7/31, 7/2, 4/15.   -follow-up COVID-19 PCR test result    Hold all nonessential medications, vitamins, supplements given observation status.     DVT prophylaxis: Pneumatic Compression Devices and Ambulate every shift  Code Status: Full    Disposition: Expected discharge <24 hours.     Marva White MD  Text Page    ~~~~~~~~~~~~~~~~~~~~~~~~~~~~~~~~~~~~~~~~~~~~~~~~~~~~~  Primary Care Physician   Vince Henry    Chief Complaint   \"I can't walk.\"     History is obtained from the patient and medical records.    History of Present Illness   Parmjit Hernández is a 65 year old male with multiple chronic medical problems including type 2 diabetes mellitus with peripheral neuropathy, morbid obesity, restless legs syndrome, bipolar type I, depression, anxiety, hypertension, dyslipidemia, h/o NSTEMI, stasis dermatitis and lymphedema bilateral legs, chronic back pain with implanted pump and continuous opioid dependence, chronic abdominal pain, obstructive sleep apnea, chronic fatigue syndrome, BPH, history of migraines who " "presents with complaint that his legs \"give out\" and feel \"wobbly\". Reports he's been feeling increasingly weak and that \"no one has been able to figure out what's wrong.\"  Upon review of the chart he has had numerous outpatient visits for these chronic fatigue and weakness complaints over the past several weeks.  He called EMS today because he felt that the weakness was worse over the past 4 days and he had some slightly elevated blood pressures.  Reports that these were systolic blood pressures in the 170s and he was advised to call 911.  However, does not seem much more elevated than in past encounters.  He is able to walk that he does not feel steady or confident enough to walk without potential fall or injury.    Endorses frequent urination and some difficulty starting a urine stream.  Also endorses intermittent nausea and diarrhea for which he took Imodium.  No fevers, chills, night sweats, known infectious exposures.  Has not had any recent palpitations, chest pain, shortness of breath, cough.  He is irritated that he is in a double room in the inpatient setting.  Requests Dilaudid IV for chronic abdominal pain as he reports he was told in the emergency department he can get another dose.    Case reviewed with Dr. Santos from the Emergency Department. Labs reviewed.  Troponin 0 0.055 and rechecked at 0.053.  CRP 5.2, white blood cell count 6.7.  Urinalysis bland.  Twelve-lead EKG with sinus rhythm, right bundle branch block, unchanged from August 2020.    Past Medical History    I have reviewed this patient's medical history and updated it with pertinent information if needed.   Past Medical History:   Diagnosis Date     Acquired lymphedema 2/4/2019     Allergic state      Amyloidosis (H)     followed by hematology Dr. Romeo status post peripheral stem cell transplant     Anxiety 5/11/2016     Anxiety and depression 12/18/2013     Bipolar I disorder (H) 9/1/2015    Under care of psychiatrist MARISOL-  " Lacey doxepin 25 mg, 2 cap bedtime DULoxetine 20 mg once daily  OLANZapine 7.5 mg  1 tab bedtime      Diabetes mellitus (H)     type 2     EKG abnormality 4/20/2020     H/O bone marrow transplant (H)      Headache(784.0)      History of diverticulitis 1/27/2015    1/15-rxed with antibiotics      Hyperlipidemia LDL goal <100 10/31/2010     Hypertension 2007     Hypertension goal BP (blood pressure) < 140/90 10/11/2011     Marianne (H) 8/20/2015     Morbid obesity (H) 8/28/2018     Myalgia and myositis, unspecified      Narcotic dependence (H) 9/6/2016    None in about 6 months- 8/1/17     NSTEMI (non-ST elevated myocardial infarction) (H) 04/19/2020     Obsessive-compulsive disorders      Obstructive sleep apnea 7/16/2008     OCD (obsessive compulsive disorder)      Other acquired absence of organ 94     Other cirrhosis of liver (H) possibly from vences  4/15/2020     Other specified viral warts      Pain in the abdomen      Peripheral edema 5/20/2018    Noncardiac Continue with  furosemide (LASIX) 20 MG tablet,  potassium       chloride SA (K-DUR/KLOR-CON M) 10 MEQ CR  Tab once daily  Basic metabolic panel     Polyneuropathy associated with underlying disease (H) 2/4/2019     Pure hypercholesterolemia      Renal failure 5/10/2019     Restless legs syndrome (RLS) 6/29/2017     Sleep apnea      Stasis dermatitis of both legs 12/31/2018     Type 2 diabetes mellitus with other specified complication (H) 7/10/2017       Past Surgical History   I have reviewed this patient's surgical history and updated it with pertinent information if needed.  Past Surgical History:   Procedure Laterality Date     BMT PROTOCOL      for systemic amyloidosis     BONE MARROW BIOPSY, BONE SPECIMEN, NEEDLE/TROCAR N/A 6/8/2016    Procedure: BIOPSY BONE MARROW;  Surgeon: Nathan Agrawal MD;  Location:  GI     BONE MARROW BIOPSY, BONE SPECIMEN, NEEDLE/TROCAR N/A 2/20/2019    Procedure: BIOPSY BONE MARROW;  Surgeon: Michael Raygoza  MD Ronnell;  Location:  GI     CHOLECYSTECTOMY  1995    lap qian     COLONOSCOPY  2012    hx polyps     ESOPHAGOSCOPY, GASTROSCOPY, DUODENOSCOPY (EGD), COMBINED N/A 5/29/2015    Procedure: COMBINED ESOPHAGOSCOPY, GASTROSCOPY, DUODENOSCOPY (EGD), BIOPSY SINGLE OR MULTIPLE;  Surgeon: John Jacob MD;  Location:  GI     HERNIA REPAIR, UMBILICAL  2006     [facility] NONSPECIFIC PROCEDURE  94    Cholecystectomy     [facility] NONSPECIFIC PROCEDURE  2000    repair deviated septum       Prior to Admission Medications   Prior to Admission Medications   Prescriptions Last Dose Informant Patient Reported? Taking?   Multiple Vitamins-Minerals (SENIOR MULTIVITAMIN PLUS PO)  Self Yes No   Sig: Take 1 tablet by mouth daily   NONFORMULARY  Self Yes No   Sig: by Intrathecal route continuous - + up to 4 boluses daily    Managed by [facility]     Medications in Pump:  fentanyl 2000mcg/mL  Bupivacaine 20mg/mL  Morphine 5.8mg/mL     Rate:   Fentanyl 1200mcg/day  Bupivacaine 12mg/day  Morphine 4.2mg/day  Pump Last Fill Date:  4/16/2020   SUMAtriptan (IMITREX) 50 MG tablet   No No   Sig: Take 1 tablet (50 mg) by mouth at onset of headache for migraine May repeat in 2 hours. Max 4 tablets/24 hours.   Turmeric 500 MG CAPS   Yes No   Sig: Take 1 capsule by mouth daily   acetaminophen (TYLENOL) 325 MG tablet   No No   Sig: Take 2 tablets (650 mg) by mouth every 4 hours as needed for mild pain   alpha-lipoic acid 100 MG capsule   Yes No   Sig: Take 200 mg by mouth daily   aspirin (ASPIRIN LOW DOSE) 81 MG tablet  Self No No   Sig: Take 1 tablet (81 mg) by mouth daily   atorvastatin (LIPITOR) 20 MG tablet   No No   Sig: Take 1 tablet (20 mg) by mouth daily   blood glucose (ACCU-CHEK COMPACT DRUM) test strip  Self No No   Sig: Use to test blood sugars 3 to 4 times daily.   blood glucose monitoring (ACCU-CHEK COMPACT CARE KIT) meter device kit  Self No No   Sig: Use to test blood sugars 3 to 4  times daily.   blood glucose  monitoring (SOFTCLIX) lancets  Self Yes No   Sig: USE TO TEST BLOOD SUGAR 3-4 TIMES DAILY OR AS DIRECTED.   caffeine (NO-DOZE) 200 MG TABS tablet   Yes No   Sig: Take 400 mg by mouth daily   cariprazine (VRAYLAR) 3 MG CAPS capsule   Yes No   Sig: Take by mouth daily   clobetasol (TEMOVATE) 0.05 % external ointment   Yes No   Sig: Apply topically daily    divalproex sodium delayed-release (DEPAKOTE) 500 MG DR tablet   Yes No   Sig: Take 2 tablets (1,000 mg) by mouth daily   furosemide (LASIX) 20 MG tablet   No No   Sig: Take 1 tablet (20 mg) by mouth daily   glimepiride (AMARYL) 1 MG tablet   No No   Sig: Take 1 tablet (1 mg) by mouth every morning (before breakfast)   lisinopril (ZESTRIL) 10 MG tablet   No No   Sig: Take 0.5 tablets (5 mg) by mouth daily for 7 days, THEN 1 tablet (10 mg) daily for 23 days.   lithium ER (LITHOBID) 300 MG CR tablet   Yes No   metFORMIN (GLUCOPHAGE-XR) 500 MG 24 hr tablet   No No   Sig: Take 1000mg in AM and 1000mg in PM   metoprolol succinate ER (TOPROL-XL) 50 MG 24 hr tablet   No No   Sig: TAKE 1 TABLET(50 MG) BY MOUTH DAILY   modafinil (PROVIGIL) 200 MG tablet   Yes No   Sig: daily    omega-3 acid ethyl esters (LOVAZA) 1 g capsule   No No   Sig: Take 1 capsule (1 g) by mouth daily   pregabalin (LYRICA) 100 MG capsule   No No   Sig: Take 1 capsule (100 mg) by mouth 3 times daily   senna-docusate (SENOKOT-S/PERICOLACE) 8.6-50 MG tablet   Yes No   Sig: Take 1 tablet by mouth daily Take 1 tablet in the morning and 2 tablets in the evening.    sitagliptin (JANUVIA) 100 MG tablet   No No   Sig: Take 1 tablet (100 mg) by mouth daily   tamsulosin (FLOMAX) 0.4 MG capsule   No No   Sig: Take 1 capsule (0.4 mg) by mouth 2 times daily   vitamin C (ASCORBIC ACID) 1000 MG TABS  Self Yes No   Sig: Take 1,000 mg by mouth daily      Facility-Administered Medications: None     Allergies   Allergies   Allergen Reactions     Cephalexin Diarrhea     Liraglutide Other (See Comments)     Sulfa Drugs  Swelling     Pt has taken  Taken sulfa drugs orally without trouble. He had problems with Sulfa eye drops. Eye swelled up     Victoza      Increased migraine frequency and severity       Social History   I have reviewed this patient's social history and updated it with pertinent information if needed. Parmjit Hernández  reports that he has never smoked. He has never used smokeless tobacco. He reports that he does not drink alcohol or use drugs.    Family History   I have reviewed this patient's family history and updated it with pertinent information if needed.   Family History   Problem Relation Age of Onset     Cerebrovascular Disease Mother      C.A.D. Mother      Hypertension Mother      Alcohol/Drug Mother      Arthritis Mother      Cerebrovascular Disease Maternal Grandmother      C.A.D. Maternal Grandmother      Alcohol/Drug Maternal Grandmother      C.A.D. Father      Heart Disease Father      Hypertension Father      Alcohol/Drug Father      Allergies Father      Circulatory Father      Depression Father      Respiratory Father      Asthma Father      Alcohol/Drug Paternal Grandfather      Cerebrovascular Disease Paternal Grandfather      Depression Son      Psychotic Disorder Son         anxiety disorder     Depression Daughter      Cerebrovascular Disease Maternal Grandfather      Cerebrovascular Disease Paternal Grandmother      Diabetes Brother      Allergies Sister      Depression Sister      Gynecology Sister        Review of Systems   The 10 point Review of Systems is negative other than noted in the HPI or here.    Physical Exam   Temp: 98.2  F (36.8  C) Temp src: Oral BP: 111/68 Pulse: 67   Resp: 18 SpO2: 95 % O2 Device: None (Room air)    Vital Signs with Ranges  Temp:  [98.2  F (36.8  C)] 98.2  F (36.8  C)  Pulse:  [67-83] 67  Resp:  [18] 18  BP: (109-128)/(68-82) 111/68  SpO2:  [93 %-96 %] 95 %  270 lbs 0 oz    Constitutional: Alert, NAD, pleasant and interactive  Eyes: PERRL, sclerae  anicteric  HEENT: mmm, atraumatic  Respiratory: Lungs CTAB, no wheezes or crackles  Cardiovascular: RRR, no murmurs  2+ LE edema, symmetric  GI: soft, non-tender, nondistended  Skin/Integument: warm, dry, no acute rashes  Genitourinary: not examined  Musc: RIVERS, normal limb strength x 4  Neuro: AOx3, no focal deficits, no tremors, no hyperreflexia  Psych: affect appropriate, not anxious, not confused      Data   Data reviewed today:  I personally reviewed: Twelve-lead EKG with sinus rhythm, right bundle branch block  Recent Labs   Lab 10/20/20  0049 10/19/20  2321 10/13/20  1122   WBC  --  6.7  --    HGB  --  12.8*  --    MCV  --  92  --    PLT  --  138*  --    NA  --  135 139   POTASSIUM  --  3.9 4.0   CHLORIDE  --  104 109   CO2  --  25 19*   BUN  --  24 33*   CR  --  1.05 1.23   ANIONGAP  --  6 11   LUIS  --  8.7 9.5   GLC  --  96 237*   ALBUMIN  --  3.5 3.5   PROTTOTAL  --  6.4* 6.8   BILITOTAL  --  0.4 0.4   ALKPHOS  --  85 94   ALT  --  38 57   AST  --  14 23   TROPI 0.053* 0.055*  --        Imaging:  Recent Results (from the past 24 hour(s))   Chest XR,  PA & LAT    Narrative    EXAM: XR CHEST 2 VW  LOCATION: [facility]  DATE/TIME: 10/20/2020 12:21 AM    INDICATION: Weakness, elevated troponin  COMPARISON: 04/20/2020      Impression    IMPRESSION: Heart size and pulmonary vascularity normal. Minimal scarring or atelectasis left base, lungs otherwise clear and unchanged.

## 2020-10-20 NOTE — TELEPHONE ENCOUNTER
"Triage Call:  Pt called stating he is experiencing a BP of 173/107 and he has Chest pain on the right side of his chest by the end of the call the chest pain subsided, \"lasted about 5 minutes\". Pt feels weak for the past few days. Blood sugar 135. Pt stated he took his blood pressure medications 1 hour and 20 minutes ago, and resting and his BP has not improved. Pt was advised to go to the ED now. Pt stated he will either call 911 or call a friend to bring him to the [facility].     COVID 19 Nurse Triage Plan/Patient Instructions    Please be aware that novel coronavirus (COVID-19) may be circulating in the community. If you develop symptoms such as fever, cough, or SOB or if you have concerns about the presence of another infection including coronavirus (COVID-19), please contact your health care provider or visit [email or web address].     Disposition/Instructions    ED Visit recommended. Follow protocol based instructions.     Bring Your Own Device:  Please also bring your smart device(s) (smart phones, tablets, laptops) and their charging cables for your personal use and to communicate with your care team during your visit.    Thank you for taking steps to prevent the spread of this virus.  o Limit your contact with others.  o Wear a simple mask to cover your cough.  o Wash your hands well and often.    Resources    [facility]: About COVID-19: [email or web address]    CDC: What to Do If You're Sick: www.cdc.gov/coronavirus/2019-ncov/about/steps-when-sick.html    CDC: Ending Home Isolation: www.cdc.gov/coronavirus/2019-ncov/hcp/disposition-in-home-patients.html     CDC: Caring for Someone: www.cdc.gov/coronavirus/2019-ncov/if-you-are-sick/care-for-someone.html     [facility]: Interim Guidance for Hospital Discharge to Home: www.health.[facility]/diseases/coronavirus/hcp/hospdischarge.pdf    [facility] clinical trials (COVID-19 research studies): " clinicalaffairs.[facility]-clinical-trials     Below are the COVID-19 hotlines at the [place] [facility] ([facility]). Interpreters are available.   o For health questions: Call 408-930-4183 or 1-972.826.2298 (7 a.m. to 7 p.m.)  o For questions about schools and childcare: Call 972-781-3144 or 1-910.238.6861 (7 a.m. to 7 p.m.)     Devorah Anderson RN Nursing Advisor 10/19/2020 10:05 PM     Reason for Disposition    [1] Systolic BP  >= 160 OR Diastolic >= 100 AND [2] cardiac or neurologic symptoms (e.g., chest pain, difficulty breathing, unsteady gait, blurred vision)    Additional Information    Negative: Difficult to awaken or acting confused (e.g., disoriented, slurred speech)    Negative: Severe difficulty breathing (e.g., struggling for each breath, speaks in single words)    Negative: [1] Chest pain lasts > 5 minutes AND [2] history of heart disease  (i.e., heart attack, bypass surgery, angina, angioplasty, CHF)    Negative: [1] Weakness of the face, arm or leg on one side of the body AND [2] new onset    Negative: [1] Numbness (i.e., loss of sensation) of the face, arm or leg on one side of the body AND [2] new onset    Negative: [1] Chest pain AND [2] took nitrogylcerin AND [3] pain was not relieved    Negative: Sounds like a life-threatening emergency to the triager    Negative: Symptom is main concern  (e.g., headache, chest pain)    Negative: Low blood pressure is main concern    Protocols used: HIGH BLOOD PRESSURE-A-AH

## 2020-10-20 NOTE — ED NOTES
[facility]  ED Nurse Handoff Report    ED Chief complaint: Generalized Weakness      ED Diagnosis:   Final diagnoses:   None       Code Status: Full Code    Allergies:   Allergies   Allergen Reactions     Cephalexin Diarrhea     Liraglutide Other (See Comments)     Sulfa Drugs Swelling     Pt has taken  Taken sulfa drugs orally without trouble. He had problems with Sulfa eye drops. Eye swelled up     Victoza      Increased migraine frequency and severity       Patient Story: Pt from home where he has been having weakness (wobbly legs/kness) for the past 4 days. Pt states he discontinued his lithium approx 1 week ago on the advice of his primary MD for same symptoms but they returned. He states he was on an antibiotic from his dermatologist for a leg wound and stopped that because he read on WebMD that one of the side effects can be weakness.     Focused Assessment:  Pt alert and oriented, pleasant and cooperative. Pt called nurse line earlier today for chest pain that lasted approx 5 mins. Then called nurse line later for elevated BP and was told to go to the ED. Found to have elevated troponin (has had in the past as well). Pt reports weakness in legs continues and was able to ambulate to/from bathroom with SBA without difficulty.     Treatments and/or interventions provided: Labs, CXR, medication, monitoring.  Labs Ordered and Resulted from Time of ED Arrival Up to the Time of Departure from the ED   CBC WITH PLATELETS DIFFERENTIAL - Abnormal; Notable for the following components:       Result Value    RBC Count 4.05 (*)     Hemoglobin 12.8 (*)     Hematocrit 37.3 (*)     Platelet Count 138 (*)     All other components within normal limits   COMPREHENSIVE METABOLIC PANEL - Abnormal; Notable for the following components:    Protein Total 6.4 (*)     All other components within normal limits   TROPONIN I - Abnormal; Notable for the following components:    Troponin I ES 0.055 (*)     All other  components within normal limits   ROUTINE UA WITH MICROSCOPIC - Abnormal; Notable for the following components:    Mucous Urine Present (*)     All other components within normal limits   TROPONIN I - Abnormal; Notable for the following components:    Troponin I ES 0.053 (*)     All other components within normal limits   CRP INFLAMMATION   TSH WITH FREE T4 REFLEX   ORTHOSTATIC BLOOD PRESSURE AND PULSE     Chest XR,  PA & LAT   Final Result   IMPRESSION: Heart size and pulmonary vascularity normal. Minimal scarring or atelectasis left base, lungs otherwise clear and unchanged.          Patient's response to treatments and/or interventions: Tolerated well    To be done/followed up on inpatient unit:  Continue plan of care    Does this patient have any cognitive concerns?: none    Activity level - Baseline/Home:  Independent  Activity Level - Current:   Stand with Assist    Patient's Preferred language: English   Needed?: No    Isolation: None  Infection: Not Applicable  Patient tested for COVID 19 prior to admission: NO  Bariatric?: No    Vital Signs:   Vitals:    10/19/20 2324 10/19/20 2330 10/20/20 0000 10/20/20 0130   BP: 112/82 124/78 112/69 109/76   Pulse: 83 73 75 67   Resp:       Temp:       TempSrc:       SpO2: 95% 96% 93% 96%   Weight:       Height:           Cardiac Rhythm:     Was the PSS-3 completed:   Yes  What interventions are required if any?               Family Comments: Not present  OBS brochure/video discussed/provided to patient/family: No              Name of person given brochure if not patient: n/a              Relationship to patient: n/a    For the majority of the shift this patient's behavior was Green.   Behavioral interventions performed were Rounding.    ED NURSE PHONE NUMBER: *[phone]

## 2020-10-20 NOTE — PROGRESS NOTES
10/20/20 2999   Quick Adds   Quick Adds Certification   Type of Visit Initial PT Evaluation   Living Environment   People in home alone   Current Living Arrangements apartment   Home Accessibility   (no stairs, short hallway)   Transportation Anticipated   (pt normally drives)   Living Environment Comments tub/shower combo - pt usually stabilizes with the wall.    Self-Care   Usual Activity Tolerance moderate   Current Activity Tolerance poor   Equipment Currently Used at Home   (None)   Activity/Exercise/Self-Care Comment Can walk ~5min, limited by pain.    Disability/Function   Walking or Climbing Stairs Difficulty yes   Walking or Climbing Stairs other (see comments)   Toileting yes   Fall history within last six months no   Change in Functional Status Since Onset of Current Illness/Injury yes   General Information   Onset of Illness/Injury or Date of Surgery 10/19/20   Referring Physician Marva White MD   Patient/Family Therapy Goals Statement (PT) I wanna feel better. I don't wanna be weak, to have my weakness go away, which has been coming on strong for a week now.    Pertinent History of Current Problem (include personal factors and/or comorbidities that impact the POC) Weak legs, fatigue - suspect mild GBS vs non-neuro fatigue, L-MRI & MG panel pending.  DM with peripheral neuropathy, RLS, bipolar, Dep & ANx, HTN, dyslipidemia, hx NSETMI, statis dermatitis with B LE lymphedema, chronic ab & LBP with opioid pump, NORMA, chronic fatigue, BPH, hx migraines.    Existing Precautions/Restrictions fall   Weight-Bearing Status - LUE full weight-bearing  (all)   Cognition   Affect/Mental Status (Cognition) WNL   Follows Commands (Cognition) WNL   Pain Assessment   Patient Currently in Pain Yes, see Vital Sign flowsheet  (LBP, ab - chronic. )   Posture    Posture Comments WFL   Range of Motion (ROM)   ROM Comment WFL, symmetrical.   Strength   Strength Comments MMT: hip flx 5B, hip abd/add 4+L 5R, knee  ext 4+L 5R, DF 5B, inv+ev 4+L 5R, PF weaker R, knee flx 4+L 5R.  Facial symmetry with lip pouting and big grin, eyes squinting / wide, pt denies drooling and dysarthria.    Bed Mobility   Comment (Bed Mobility) Supine to/from sitting & rolling B Anne increased effort.    Transfers   Transfer Safety Comments Sit-stands Anne, pivots distant supervision-Anne with subtle imbalance, no AD.    Gait/Stairs (Locomotion)   Distance in Feet (Required for LE Total Joints) 80' no AD - legs quick to fatigue per pt request, High guard, WBOS, pt reports feeling unsteady, mild instability horizontal head turns. Supervision.    Comment (Gait/Stairs) TBD - pt felt too weak today.    Balance   Balance Comments MIld imbalance, DGI pending - pt too quick to fatigue today.    Sensory Examination   Sensory Perception Comments Light touch symmetrical B LEs, proprioception 4/5 R great toe (missed down 1x), L great toe 5/5.    Coordination   Coordination Comments WFL, isolated testing TBD.    Muscle Tone   Muscle Tone Comments WFL, isolated testing TBD.    Clinical Impression   Criteria for Skilled Therapeutic Intervention yes, treatment indicated   PT Diagnosis (PT) Impaired mobility   Influenced by the following impairments Impaired strength, balance, activity tolerance   Functional limitations due to impairments Impaired mobility   Clinical Presentation Stable/Uncomplicated   Clinical Decision Making (Complexity) low complexity   Therapy Frequency (PT) Daily   Predicted Duration of Therapy Intervention (days/wks) 1 week   Planned Therapy Interventions (PT) balance training;bed mobility training;gait training;motor coordination training;neuromuscular re-education;patient/family education;postural re-education;stair training;strengthening;transfer training   Anticipated Equipment Needs at Discharge (PT) cane, straight;wheelchair   Risk & Benefits of therapy have been explained evaluation/treatment results reviewed;current/potential barriers  "reviewed;participants included;patient;participants voiced agreement with care plan;risks/benefits reviewed;care plan/treatment goals reviewed   PT Discharge Planning    PT Discharge Recommendation (DC Rec) home with outpatient physical therapy   PT Rationale for DC Rec Potential need for ADs for energy conservation, following.    PT Brief overview of current status  OP PT for balance, fatigue management, mobility & activity tolerance progressions.    Therapy Certification   Start of care date 10/20/20   Certification date from 10/20/20   Certification date to 10/27/20   Medical Diagnosis Mobility/falls   [facility] TM \"6 Clicks\"   2016, Trustees of [facility], under license to [employer].  All rights reserved.   6 Clicks Short Forms Basic Mobility Inpatient Short Form   [facility] AM-PAC  \"6 Clicks\" V.2 Basic Mobility Inpatient Short Form   1. Turning from your back to your side while in a flat bed without using bedrails? 4 - None   2. Moving from lying on your back to sitting on the side of a flat bed without using bedrails? 4 - None   3. Moving to and from a bed to a chair (including a wheelchair)? 4 - None   4. Standing up from a chair using your arms (e.g., wheelchair, or bedside chair)? 4 - None   5. To walk in hospital room? 4 - None   6. Climbing 3-5 steps with a railing? 3 - A Little   Basic Mobility Raw Score (Score out of 24.Lower scores equate to lower levels of function) 23   Total Evaluation Time   Total Evaluation Time (Minutes) 30     "

## 2020-10-20 NOTE — PROGRESS NOTES
[facility]      OUTPATIENT PHYSICAL THERAPY EVALUATION  PLAN OF TREATMENT FOR OUTPATIENT REHABILITATION  (COMPLETE FOR INITIAL CLAIMS ONLY)  Patient's Last Name, First Name, M.I.  YOB: 1955  Parmjit Hernández                        Provider's Name  [facility] Medical Record No.  [id number]                               Onset Date:  10/19/20   Start of Care Date:  10/20/20      Type:     _X_PT   ___OT   ___SLP Medical Diagnosis:  Mobility/falls                        PT Diagnosis:  Impaired mobility   Visits from SOC:  1   _________________________________________________________________________________  Plan of Treatment/Functional Goals    Planned Interventions: balance training, bed mobility training, gait training, motor coordination training, neuromuscular re-education, patient/family education, postural re-education, stair training, strengthening, transfer training     Goals: See Physical Therapy Goals on Care Plan in [employer] electronic health record.    Therapy Frequency: Daily  Predicted Duration of Therapy Intervention: 1 week  _________________________________________________________________________________    I CERTIFY THE NEED FOR THESE SERVICES FURNISHED UNDER        THIS PLAN OF TREATMENT AND WHILE UNDER MY CARE     (Physician co-signature of this document indicates review and certification of the therapy plan).                Certification date from: 10/20/20, Certification date to: 10/27/20    Referring Physician: Marva White MD            Initial Assessment        See Physical Therapy evaluation dated 10/20/20 in Epic electronic health record.

## 2020-10-20 NOTE — CONSULTS
[facility]    Neurology Consultation     Date of Admission:  10/19/2020      Assessment & Plan   Parmjit Hernández is a 65 year old male who was admitted on 10/19/2020. I was asked to see the patient for generalized weakness.  Deconditioning vs weakness due to recent mild illness, vs very mild GBS, vs non-neurologic disorder causing fatigue.    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  His strength on exam is actually quite good, 5/5 except plantar flexion.    --------At his level of strength, treatment with IVIG is not indicated at this time.    ----------If strength decreases, then would reassess for treatment.  ----------Also will defer LP at this time given mild symptoms.  ----------Respiratory consult for NIF and FVC.  Currently breathing comfortably  -----------C Jejuni stool test.    --Given L spine surgery history, will get MRI L spine.  --Myasthenia panel to eval for possible MG.    --pertinent labs: VPA 14, lithium less than 0.2, ck normal at 89, UA normal, TSH normal, CMP normal, CBC chronic mild anemia and thrombocytopenia.       I discussed the above diagnosis, assessment, and further testing with the patient.       Oralia Sutton MD  [facility] Neurology  Office Phone [phone]            Code Status    Full Code        Reason for Consult   Reason for consult: I was asked by  to evaluate this patient for subjective weakness.      Chief Complaint   Diffuse weakness    History is obtained from the patient and the electronic medical chart.    History of Present Illness   Parmjit Hernández is a 65 year old male with a history of chronic pain (intrathecal pain pump), chronic fatigue, L4-5 discectomy in Aug 2020, who presents with an overwhelming feeling of weakness.    The weakness started gradually around 4-5 days ago, first noted in the knees.  Then spread up and down the legs, and a little into the arms.  No facial symptoms.  No new numbness or tingling.   "He feels weak when walking in particular, \"fatigued\", but not true muscle fatigue.  Does not improve with rest.  Lower back pain is baseline.  Some increase in headaches and abdominal pain the past few days.    No feelings of facial weakness, no dysarthria, no dyspnea, no dysphagia, no diplopia, no ptosis.    Recent mild GI symptoms- some abdominal pain, abdominal \"upset\" and loose stools.    L4-5 discectomy in Aug 2020, steroid lumbar injection about 2 weeks ago.      Past Medical History   I have reviewed this patient's medical history and updated it with pertinent information if needed.   Past Medical History:   Diagnosis Date     Acquired lymphedema 2/4/2019     Allergic state      Amyloidosis (H)     followed by hematology Dr. Romeo status post peripheral stem cell transplant     Anxiety 5/11/2016     Anxiety and depression 12/18/2013     Bipolar I disorder (H) 9/1/2015    Under care of psychiatrist [facility]- Dr Rahman doxepin 25 mg, 2 cap bedtime DULoxetine 20 mg once daily  OLANZapine 7.5 mg  1 tab bedtime      Diabetes mellitus (H)     type 2     EKG abnormality 4/20/2020     H/O bone marrow transplant (H)      Headache(784.0)      History of diverticulitis 1/27/2015    1/15-rxed with antibiotics      Hyperlipidemia LDL goal <100 10/31/2010     Hypertension 2007     Hypertension goal BP (blood pressure) < 140/90 10/11/2011     Marianne (H) 8/20/2015     Morbid obesity (H) 8/28/2018     Myalgia and myositis, unspecified      Narcotic dependence (H) 9/6/2016    None in about 6 months- 8/1/17     NSTEMI (non-ST elevated myocardial infarction) (H) 04/19/2020     Obsessive-compulsive disorders      Obstructive sleep apnea 7/16/2008     OCD (obsessive compulsive disorder)      Other acquired absence of organ 94     Other cirrhosis of liver (H) possibly from vences  4/15/2020     Other specified viral warts      Pain in the abdomen      Peripheral edema 5/20/2018    Noncardiac Continue with  furosemide (LASIX) 20 MG " tablet,  potassium       chloride SA (K-DUR/KLOR-CON M) 10 MEQ CR  Tab once daily  Basic metabolic panel     Polyneuropathy associated with underlying disease (H) 2/4/2019     Pure hypercholesterolemia      Renal failure 5/10/2019     Restless legs syndrome (RLS) 6/29/2017     Sleep apnea      Stasis dermatitis of both legs 12/31/2018     Type 2 diabetes mellitus with other specified complication (H) 7/10/2017       Past Surgical History   I have reviewed this patient's surgical history and updated it with pertinent information if needed.  Past Surgical History:   Procedure Laterality Date     BMT PROTOCOL      for systemic amyloidosis     BONE MARROW BIOPSY, BONE SPECIMEN, NEEDLE/TROCAR N/A 6/8/2016    Procedure: BIOPSY BONE MARROW;  Surgeon: Nathan Agrawal MD;  Location:  GI     BONE MARROW BIOPSY, BONE SPECIMEN, NEEDLE/TROCAR N/A 2/20/2019    Procedure: BIOPSY BONE MARROW;  Surgeon: Michael Raygoza MD;  Location:  GI     CHOLECYSTECTOMY  1995    lap qian     COLONOSCOPY  2012    hx polyps     ESOPHAGOSCOPY, GASTROSCOPY, DUODENOSCOPY (EGD), COMBINED N/A 5/29/2015    Procedure: COMBINED ESOPHAGOSCOPY, GASTROSCOPY, DUODENOSCOPY (EGD), BIOPSY SINGLE OR MULTIPLE;  Surgeon: John Jacob MD;  Location:  GI     HERNIA REPAIR, UMBILICAL  2006     [facility] NONSPECIFIC PROCEDURE  94    Cholecystectomy     [facility] NONSPECIFIC PROCEDURE  2000    repair deviated septum       Prior to Admission Medications   Prior to Admission Medications   Prescriptions Last Dose Informant Patient Reported? Taking?   ALPRAZolam (XANAX) 0.5 MG tablet 10/19/2020 at Unknown time Self Yes Yes   Sig: Take 0.5 mg by mouth At Bedtime   Multiple Vitamins-Minerals (SENIOR MULTIVITAMIN PLUS PO) 10/19/2020 at Unknown time Self Yes Yes   Sig: Take 1 tablet by mouth daily   NONFORMULARY  Self Yes Yes   Sig: by Intrathecal route continuous - + up to 4 boluses daily (100mcg each bolus usually once or 2x per day)    Managed by   [facility]     Medications in Pump:  fentanyl 2000mcg/mL  Bupivacaine 20mg/mL  Morphine 5.8mg/mL     Rate:   Fentanyl 1200mcg/day  Bupivacaine 12mg/day  Morphine 4.2mg/day  Pump Last Fill Date:  4/16/2020   SUMAtriptan (IMITREX) 50 MG tablet  Self No Yes   Sig: Take 1 tablet (50 mg) by mouth at onset of headache for migraine May repeat in 2 hours. Max 4 tablets/24 hours.   Turmeric 500 MG CAPS 10/19/2020 at Unknown time Self Yes Yes   Sig: Take 1,200 mg by mouth daily    acetaminophen (TYLENOL) 325 MG tablet  Self No Yes   Sig: Take 2 tablets (650 mg) by mouth every 4 hours as needed for mild pain   alpha-lipoic acid 100 MG capsule 10/19/2020 at Unknown time Self Yes Yes   Sig: Take 200 mg by mouth daily   aspirin (ASPIRIN LOW DOSE) 81 MG tablet 10/19/2020 at Unknown time Self No Yes   Sig: Take 1 tablet (81 mg) by mouth daily   atorvastatin (LIPITOR) 20 MG tablet 10/19/2020 at Unknown time Self No Yes   Sig: Take 1 tablet (20 mg) by mouth daily   blood glucose (ACCU-CHEK COMPACT DRUM) test strip  Self No No   Sig: Use to test blood sugars 3 to 4 times daily.   blood glucose monitoring (ACCU-CHEK COMPACT CARE KIT) meter device kit  Self No No   Sig: Use to test blood sugars 3 to 4  times daily.   blood glucose monitoring (SOFTCLIX) lancets  Self Yes No   Sig: USE TO TEST BLOOD SUGAR 3-4 TIMES DAILY OR AS DIRECTED.   caffeine (NO-DOZE) 200 MG TABS tablet 10/19/2020 at Unknown time Self Yes Yes   Sig: Take 400 mg by mouth daily   cariprazine (VRAYLAR) 3 MG CAPS capsule 10/19/2020 at Unknown time Self Yes Yes   Sig: Take 3 mg by mouth daily    divalproex sodium delayed-release (DEPAKOTE) 500 MG DR tablet 10/19/2020 at Unknown time Self Yes Yes   Sig: Take 750 mg by mouth At Bedtime    furosemide (LASIX) 20 MG tablet 10/19/2020 at Unknown time Self No Yes   Sig: Take 1 tablet (20 mg) by mouth daily   glimepiride (AMARYL) 1 MG tablet 10/19/2020 at Unknown time Self No Yes   Sig: Take 1 tablet (1 mg)  by mouth every morning (before breakfast)   lisinopril (ZESTRIL) 10 MG tablet 10/19/2020 at Unknown time Self Yes Yes   Sig: Take 10 mg by mouth daily   metFORMIN (GLUCOPHAGE-XR) 500 MG 24 hr tablet  Self No No   Sig: Take 1000mg in AM and 1000mg in PM   metoprolol succinate ER (TOPROL-XL) 50 MG 24 hr tablet  Self No No   Sig: TAKE 1 TABLET(50 MG) BY MOUTH DAILY   modafinil (PROVIGIL) 200 MG tablet 10/19/2020 at Unknown time Self Yes Yes   Sig: Take 300 mg by mouth daily    omega-3 acid ethyl esters (LOVAZA) 1 g capsule 10/19/2020 at Unknown time Self No Yes   Sig: Take 1 capsule (1 g) by mouth daily   pregabalin (LYRICA) 100 MG capsule 10/19/2020 at Unknown time Self No Yes   Sig: Take 1 capsule (100 mg) by mouth 3 times daily   senna-docusate (SENOKOT-S/PERICOLACE) 8.6-50 MG tablet 10/19/2020 at Unknown time Self Yes Yes   Sig: Take 1 tablet by mouth daily Take 1 tablet in the morning and 2 tablets in the evening.    sitagliptin (JANUVIA) 100 MG tablet 10/19/2020 at Unknown time Self No Yes   Sig: Take 1 tablet (100 mg) by mouth daily   tamsulosin (FLOMAX) 0.4 MG capsule 10/19/2020 at Unknown time Self No Yes   Sig: Take 1 capsule (0.4 mg) by mouth 2 times daily   vitamin C (ASCORBIC ACID) 1000 MG TABS 10/19/2020 at Unknown time Self Yes Yes   Sig: Take 1,000 mg by mouth daily      Facility-Administered Medications: None     Allergies   Allergies   Allergen Reactions     Cephalexin Diarrhea     Liraglutide Other (See Comments)     Sulfa Drugs Swelling     Pt has taken  Taken sulfa drugs orally without trouble. He had problems with Sulfa eye drops. Eye swelled up     Victoza      Increased migraine frequency and severity       Social History   I have reviewed this patient's social history and updated it with pertinent information if needed. Parmjit VILLEDA Betty  reports that he has never smoked. He has never used smokeless tobacco. He reports that he does not drink alcohol or use drugs.    Family History   I have  reviewed this patient's family history and updated it with pertinent information if needed.   Family History   Problem Relation Age of Onset     Cerebrovascular Disease Mother      C.A.D. Mother      Hypertension Mother      Alcohol/Drug Mother      Arthritis Mother      Cerebrovascular Disease Maternal Grandmother      C.A.D. Maternal Grandmother      Alcohol/Drug Maternal Grandmother      C.A.D. Father      Heart Disease Father      Hypertension Father      Alcohol/Drug Father      Allergies Father      Circulatory Father      Depression Father      Respiratory Father      Asthma Father      Alcohol/Drug Paternal Grandfather      Cerebrovascular Disease Paternal Grandfather      Depression Son      Psychotic Disorder Son         anxiety disorder     Depression Daughter      Cerebrovascular Disease Maternal Grandfather      Cerebrovascular Disease Paternal Grandmother      Diabetes Brother      Allergies Sister      Depression Sister      Gynecology Sister        Review of Systems   The 10 point Review of Systems is negative other than noted in the HPI.    Physical Exam   Temp: 96.2  F (35.7  C) Temp src: Oral BP: 115/79 Pulse: 65   Resp: 14 SpO2: 94 % O2 Device: None (Room air)    Vital Signs with Ranges  Temp:  [96.2  F (35.7  C)-98.2  F (36.8  C)] 96.2  F (35.7  C)  Pulse:  [64-83] 65  Resp:  [14-18] 14  BP: (109-141)/(68-87) 115/79  SpO2:  [93 %-97 %] 94 %  270 lbs 0 oz    General Appearance:  No acute distress  Neuro:       Mental Status Exam:    A,A, fully oriented       Cranial Nerves:   CN 2-12 examined and intact           Motor:   5/5 BUE and BLE except 4/5 B plantarflexion (this exam despite patient's objective complaints of weakness)           Reflexes:  1+ BUE, 1+ B knees, 0 B Achilles, mute toes and no clonus       Sensory:  Nl LT and vibration BUE an BLE                   Coordination:   fnf normal bilaterally, mild ataxia on tandem (likely baseline per patient)       Gait:  Normal.  Patient  subjectively describes the walking as exhausting.   Neck: no nuchal rigidity. No carotid bruits.    Extremities: No clubbing, no cyanosis, + BLE chronic edema  CV: RRR nl s1, s2  Resp: CTAB        Data   Results for orders placed or performed during the hospital encounter of 10/19/20 (from the past 24 hour(s))   CBC with platelets + differential   Result Value Ref Range    WBC 6.7 4.0 - 11.0 10e9/L    RBC Count 4.05 (L) 4.4 - 5.9 10e12/L    Hemoglobin 12.8 (L) 13.3 - 17.7 g/dL    Hematocrit 37.3 (L) 40.0 - 53.0 %    MCV 92 78 - 100 fl    MCH 31.6 26.5 - 33.0 pg    MCHC 34.3 31.5 - 36.5 g/dL    RDW 12.9 10.0 - 15.0 %    Platelet Count 138 (L) 150 - 450 10e9/L    Diff Method Automated Method     % Neutrophils 73.5 %    % Lymphocytes 14.7 %    % Monocytes 6.3 %    % Eosinophils 4.5 %    % Basophils 0.7 %    % Immature Granulocytes 0.3 %    Nucleated RBCs 0 0 /100    Absolute Neutrophil 4.9 1.6 - 8.3 10e9/L    Absolute Lymphocytes 1.0 0.8 - 5.3 10e9/L    Absolute Monocytes 0.4 0.0 - 1.3 10e9/L    Absolute Eosinophils 0.3 0.0 - 0.7 10e9/L    Absolute Basophils 0.1 0.0 - 0.2 10e9/L    Abs Immature Granulocytes 0.0 0 - 0.4 10e9/L    Absolute Nucleated RBC 0.0    Comprehensive metabolic panel   Result Value Ref Range    Sodium 135 133 - 144 mmol/L    Potassium 3.9 3.4 - 5.3 mmol/L    Chloride 104 94 - 109 mmol/L    Carbon Dioxide 25 20 - 32 mmol/L    Anion Gap 6 3 - 14 mmol/L    Glucose 96 70 - 99 mg/dL    Urea Nitrogen 24 7 - 30 mg/dL    Creatinine 1.05 0.66 - 1.25 mg/dL    GFR Estimate 75 >60 mL/min/[1.73_m2]    GFR Estimate If Black 86 >60 mL/min/[1.73_m2]    Calcium 8.7 8.5 - 10.1 mg/dL    Bilirubin Total 0.4 0.2 - 1.3 mg/dL    Albumin 3.5 3.4 - 5.0 g/dL    Protein Total 6.4 (L) 6.8 - 8.8 g/dL    Alkaline Phosphatase 85 40 - 150 U/L    ALT 38 0 - 70 U/L    AST 14 0 - 45 U/L   CRP inflammation   Result Value Ref Range    CRP Inflammation 5.2 0.0 - 8.0 mg/L   Troponin I   Result Value Ref Range    Troponin I ES 0.055 (H)  0.000 - 0.045 ug/L   TSH with free T4 reflex   Result Value Ref Range    TSH 3.00 0.40 - 4.00 mU/L   EKG 12 lead   Result Value Ref Range    Interpretation ECG Click View Image link to view waveform and result    Chest XR,  PA & LAT    Narrative    EXAM: XR CHEST 2 VW  LOCATION: [facility]  DATE/TIME: 10/20/2020 12:21 AM    INDICATION: Weakness, elevated troponin  COMPARISON: 04/20/2020      Impression    IMPRESSION: Heart size and pulmonary vascularity normal. Minimal scarring or atelectasis left base, lungs otherwise clear and unchanged.   Routine UA with microscopic   Result Value Ref Range    Color Urine Yellow     Appearance Urine Clear     Glucose Urine Negative NEG^Negative mg/dL    Bilirubin Urine Negative NEG^Negative    Ketones Urine Negative NEG^Negative mg/dL    Specific Gravity Urine 1.020 1.003 - 1.035    Blood Urine Negative NEG^Negative    pH Urine 5.5 5.0 - 7.0 pH    Protein Albumin Urine Negative NEG^Negative mg/dL    Urobilinogen mg/dL Normal 0.0 - 2.0 mg/dL    Nitrite Urine Negative NEG^Negative    Leukocyte Esterase Urine Negative NEG^Negative    Source Midstream Urine     WBC Urine <1 0 - 5 /HPF    RBC Urine <1 0 - 2 /HPF    Squamous Epithelial /HPF Urine <1 0 - 1 /HPF    Mucous Urine Present (A) NEG^Negative /LPF   Troponin I   Result Value Ref Range    Troponin I ES 0.053 (H) 0.000 - 0.045 ug/L   CK total   Result Value Ref Range    CK Total 89 30 - 300 U/L   Lithium level   Result Value Ref Range    Lithium Level <0.20 (L) 0.60 - 1.20 mmol/L   Valproic acid   Result Value Ref Range    Valproic Acid Level 14 (L) 50 - 100 mg/L     *Note: Due to a large number of results and/or encounters for the requested time period, some results have not been displayed. A complete set of results can be found in Results Review.           Imaging and procedures:  I personally reviewed these images.

## 2020-10-20 NOTE — ED PROVIDER NOTES
"  History   Chief Complaint:  Generalized Weakness    HPI  Parmjit Hernández is a 65 year old male with a history of NSTEMI, type 2 DM, bipolar 1 disorder, hypertension, and peripheral neuropathy who presents via EMS for evaluation of generalized weakness. He reports weakness for the past week, worse in the last 4 days with some elevated blood pressures as well. Today he called the nurse line because his BP was in the 170's, and he was told to call 911. Here he states his legs feel \"wobbly\" and his extremities feel heavy. He also reports ongoing difficulty urinating and nausea, as well as diarrhea a couple days ago for which he took imodium with relief. He denies syncope, lightheadedness, dizziness, or falls. He denies cough, shortness of breath, chest pain, fever, chills, or diaphoresis. He also denies dark urine. Notes his BG has been normal, with a dip yesterday to 84 which was resolved.     The patinet notes his lisinopril was recently increased from 5mg to 10mg. He also notes he is on an antibiotic for a leg wound and did not take today because one of the side effects was weakness. He states this wound is healing well. He additionally reports he started taking lithium 1 week ago, but stopped taking due to weakness per MD's instructions.     Allergies:  Cephalexin  Liraglutide  Sulfa Drugs  Victoza    Medications:    Aspirin 81 mg  Lipitor  Vraylar  Depakot  Lasix  Amaryl  Zestril  Lithobid  Glucophage  Toprol  Provigil  Lovaza  Lyrica  Januvia  Senokot  Flomax    Past Medical History:    Lymphedema  Amyloidosis  Anxiety  Bipolar 1 disorder  Diverticulitis  Hyperlipidemia  Hypertension  [place]ainne  Obesity  Narcotic dependence  NSTEMI  OCD  Cirrhosis of liver  Renal failure  Sleep apnea  Type 2 DM  KWABENA  DDD  Peripheral neuropathy  Migraines     Past Surgical History:    BMT protocol  Bone marrow biopsy x2  Cholecystectomy  Colonoscopy  EGD  Hernia repair  Repair deviated septum     Family History:  " "  CVD  CAD  Hypertension  Alcohol/drug abuse  Arthritis     Social History:  Marital Status:   Tobacco use: Never  Alcohol use: No  Drug use: No    Review of Systems   Constitutional: Negative for chills, diaphoresis and fever.   Respiratory: Negative for cough and shortness of breath.    Cardiovascular: Negative for chest pain.   Gastrointestinal: Positive for nausea.   Genitourinary: Positive for difficulty urinating.        Denies dark urine   Neurological: Positive for weakness. Negative for dizziness, syncope and light-headedness.   All other systems reviewed and are negative.      Physical Exam     Patient Vitals for the past 24 hrs:   BP Temp Temp src Pulse Resp SpO2 Height Weight   10/20/20 0200 111/68 -- -- 67 -- 95 % -- --   10/20/20 0130 109/76 -- -- 67 -- 96 % -- --   10/20/20 0000 112/69 -- -- 75 -- 93 % -- --   10/19/20 2330 124/78 -- -- 73 -- 96 % -- --   10/19/20 2324 112/82 -- -- 83 -- 95 % -- --   10/19/20 2322 118/73 -- -- 74 -- 95 % -- --   10/19/20 2308 128/75 98.2  F (36.8  C) Oral 73 18 95 % 1.702 m (5' 7\") 122.5 kg (270 lb)       Physical Exam   Constitutional:  Appears well-developed and well-nourished. Cooperative.   HENT:   Head:    Atraumatic.   Mouth/Throat:   Oropharynx is without erythema or exudate and mucous     membranes are moist.   Eyes:    Conjunctivae normal and EOM are normal.      Pupils are equal, round, and reactive to light.   Neck:    Normal range of motion. Neck supple.   Cardiovascular:  Normal rate, regular rhythm, normal heart sounds and radial and    dorsalis pedis pulses are 2+ and symmetric.    Pulmonary/Chest:  Effort normal and breath sounds normal.   Abdominal:   Soft. Bowel sounds are normal.      No splenomegaly or hepatomegaly. No tenderness. No rebound.   Musculoskeletal:  Normal range of motion. No edema and no tenderness.   Neurological:  Alert. Normal strength. No cranial nerve deficit. Symmetric bilateral LE strength. Speech normal. Gait intact. " Normal finger nose finger. Normal speech  Skin:    Skin is warm and dry.   Psychiatric:   Depressed mood, appears mildly anxious.    Emergency Department Course     ECG:  Indication: Weakness   Time: 2332  Vent. Rate 71 bpm. MO interval 212. QRS duration 126. QT/QTc 394/428. P-R-T axis 32 -46 7.   Sinus rhythm with 1st degree AV block, Right BBB, Left anterior fascicular block, Bifascicular block, Inferior infarct, age undetermined, Anteroseptal infarct, age undetermined   No significant change from 8/17/20  Read time: 0000    Imaging:  Radiology findings were communicated with the patient who voiced understanding of the findings.    XR Chest PA & LAT:  Heart size and pulmonary vascularity normal. Minimal scarring or atelectasis left base, lungs otherwise clear and unchanged, as per radiology.     Laboratory:  Laboratory findings were communicated with the patient who voiced understanding of the findings.    CBC: WBC: 6.7, HGB: 12.8 (L), PLT: 138 (L)    CMP: Protein total 6.4 (L), o/w WNL (Creatinine: 1.05)    Troponin (Collected 2321): 0.055 (H)    Repeat Troponin (Collected 0049): 0.053 (H)    CRP inflammation: 5.2    TSH: 3.00    UA with micro: Mucous urine present, o/w negative    Interventions:  0023: Aspirin, 162 mg, Oral  0340: Dilaudid, 0.5 mg, IV    Emergency Department Course:  Past medical records, nursing notes, and vitals reviewed.    2320: I performed an exam of the patient as documented above.     EKG obtained in the ED, see results above.   IV was inserted and blood was drawn for laboratory testing, results above.  The patient was sent for a XR while in the emergency department, results above.   The patient provided a urine sample here in the emergency department. This was sent for laboratory testing, findings above.    0020: I rechecked the patient and discussed the results of his workup thus far.     0245:   I rechecked the patient and again discussed the results of his workup.     0335:   I spoke  with Dr. FRANCESCO White of the Hospitalist service regarding patient's presentation, findings, and plan of care.    Findings and plan explained to the Patient who consents to admission. Discussed the patient with Dr. FRANCESCO White, who will admit the patient to a observation bed for further monitoring, evaluation, and treatment.    I personally reviewed the laboratory and imaging results with the Patient and answered all related questions prior to admission.     Impression & Plan     Covid-19  Parmjit Hernández was evaluated during a global COVID-19 pandemic, which necessitated consideration that the patient might be at risk for infection with the SARS-CoV-2 virus that causes COVID-19.   Applicable protocols for evaluation were followed during the patient's care.   COVID-19 was considered as part of the patient's evaluation. The plan for testing is:  a test was obtained at a previous visit and reviewed & considered today.    Medical Decision Making:  Parmjit Hernández is a 65 year old male who presents with bilateral LE weakness. This has been present for the last several days, gradually worsening. He feels like when he walks his knees will give out. Even at rest his legs feel weak.    On physical exam, he has general weakness of LE but no focal weakness. Sensation to light touch is grossly intact and patellar reflexes are 2+.     Metabolic panel CBC with differential, urinalysis all looked unremarkable aside from stable anemia. EKG did not look acutely ischemic and looked similar to old. His initial troponin came back elevated. I reviewed his old records and he has had troponin elevations in the past. Over the summer he had an MRI of his heart that the cardiologist did not think showed acute ischemia. A second troponin looks similar to the first. He has not had chest pain or dyspnea. I don't think this is due to acute coronary syndrome.     Patients weakness does not seem consistent with guillain-barre. I do not find any other  neurologic abnormalities on exam. The patient said he is already working with his PCP and hasn't found an answer to his weakness that he said is progressive. He is weary of going home due to the progressive nature of his symptoms.     I talked to  hospitalist and she agreed to accept the patient for admission. He will come into the observation unit for further evaluation. I discussed test results thus far with the patient and the plan for admission and he voices understanding        Diagnosis:    ICD-10-CM    1. Bilateral leg weakness  R29.898    2. Generalized muscle weakness  M62.81    3. Chronic abdominal pain  R10.9     G89.29        Disposition:  Admitted to Dr. FRANCESCO White.    Scribe Disclosure:  I, Lea Figueroa, am serving as a scribe at 11:02 PM on 10/19/2020 to document services personally performed by Joshua Varela MD based on my observations and the provider's statements to me.      Joshua Varela MD  10/20/20 0918

## 2020-10-20 NOTE — PHARMACY-ADMISSION MEDICATION HISTORY
Pharmacy Medication History  Admission medication history interview status for the 10/19/2020  admission is complete. See EPIC admission navigator for prior to admission medications       Medication history sources: Patient  Location of interview: Phone  Medication history source reliability: Good  Adherence assessment: Good    Significant changes made to the medication list:  Added: Alprazolam.  Removed: Clobetal ointment, Lithium  Modified: Dose of Depakote, modafinil, Tumeric      Additional medication history information:   None    Medication reconciliation completed by provider prior to medication history? No    Time spent in this activity: 25 min      Prior to Admission medications    Medication Sig Last Dose Taking? Auth Provider   acetaminophen (TYLENOL) 325 MG tablet Take 2 tablets (650 mg) by mouth every 4 hours as needed for mild pain  Yes Raphael Silvestre,    alpha-lipoic acid 100 MG capsule Take 200 mg by mouth daily 10/19/2020 at Unknown time Yes Reported, Patient   ALPRAZolam (XANAX) 0.5 MG tablet Take 0.5 mg by mouth At Bedtime 10/19/2020 at Unknown time Yes Unknown, Entered By History   aspirin (ASPIRIN LOW DOSE) 81 MG tablet Take 1 tablet (81 mg) by mouth daily 10/19/2020 at Unknown time Yes Vince Henry MD   atorvastatin (LIPITOR) 20 MG tablet Take 1 tablet (20 mg) by mouth daily 10/19/2020 at Unknown time Yes Bernie Nazario MD   caffeine (NO-DOZE) 200 MG TABS tablet Take 400 mg by mouth daily 10/19/2020 at Unknown time Yes Reported, Patient   cariprazine (VRAYLAR) 3 MG CAPS capsule Take 3 mg by mouth daily  10/19/2020 at Unknown time Yes Reported, Patient   divalproex sodium delayed-release (DEPAKOTE) 500 MG DR tablet Take 750 mg by mouth At Bedtime  10/19/2020 at Unknown time Yes Maribeth Ardon MD   furosemide (LASIX) 20 MG tablet Take 1 tablet (20 mg) by mouth daily 10/19/2020 at Unknown time Yes Vince Henry MD   glimepiride (AMARYL) 1 MG tablet Take 1  tablet (1 mg) by mouth every morning (before breakfast) 10/19/2020 at Unknown time Yes Vince Henry MD   lisinopril (ZESTRIL) 10 MG tablet Take 10 mg by mouth daily 10/19/2020 at Unknown time Yes Unknown, Entered By History   modafinil (PROVIGIL) 200 MG tablet Take 300 mg by mouth daily  10/19/2020 at Unknown time Yes Reported, Patient   Multiple Vitamins-Minerals (SENIOR MULTIVITAMIN PLUS PO) Take 1 tablet by mouth daily 10/19/2020 at Unknown time Yes Reported, Patient   NONFORMULARY by Intrathecal route continuous - + up to 4 boluses daily (100mcg each bolus usually once or 2x per day)    Managed by [facility]     Medications in Pump:  fentanyl 2000mcg/mL  Bupivacaine 20mg/mL  Morphine 5.8mg/mL     Rate:   Fentanyl 1200mcg/day  Bupivacaine 12mg/day  Morphine 4.2mg/day  Pump Last Fill Date:  4/16/2020  Yes Unknown, Entered By History   omega-3 acid ethyl esters (LOVAZA) 1 g capsule Take 1 capsule (1 g) by mouth daily 10/19/2020 at Unknown time Yes Bernie Nazario MD   pregabalin (LYRICA) 100 MG capsule Take 1 capsule (100 mg) by mouth 3 times daily 10/19/2020 at Unknown time Yes Vince Henry MD   senna-docusate (SENOKOT-S/PERICOLACE) 8.6-50 MG tablet Take 1 tablet by mouth daily Take 1 tablet in the morning and 2 tablets in the evening.  10/19/2020 at Unknown time Yes Reported, Patient   sitagliptin (JANUVIA) 100 MG tablet Take 1 tablet (100 mg) by mouth daily 10/19/2020 at Unknown time Yes Vince Henry MD   SUMAtriptan (IMITREX) 50 MG tablet Take 1 tablet (50 mg) by mouth at onset of headache for migraine May repeat in 2 hours. Max 4 tablets/24 hours.  Yes Maribeth Ardon MD   tamsulosin (FLOMAX) 0.4 MG capsule Take 1 capsule (0.4 mg) by mouth 2 times daily 10/19/2020 at Unknown time Yes Vince Henry MD   Turmeric 500 MG CAPS Take 1 capsule by mouth daily 10/19/2020 at Unknown time Yes Reported, Patient   vitamin C (ASCORBIC ACID) 1000 MG TABS  Take 1,000 mg by mouth daily 10/19/2020 at Unknown time Yes Reported, Patient   blood glucose (ACCU-CHEK COMPACT DRUM) test strip Use to test blood sugars 3 to 4 times daily.   Vince Henry MD   blood glucose monitoring (ACCU-CHEK COMPACT CARE KIT) meter device kit Use to test blood sugars 3 to 4  times daily.   Vince Henry MD   blood glucose monitoring (SOFTCLIX) lancets USE TO TEST BLOOD SUGAR 3-4 TIMES DAILY OR AS DIRECTED.   Reported, Patient   metFORMIN (GLUCOPHAGE-XR) 500 MG 24 hr tablet Take 1000mg in AM and 1000mg in PM   Bernie Nazario MD   metoprolol succinate ER (TOPROL-XL) 50 MG 24 hr tablet TAKE 1 TABLET(50 MG) BY MOUTH DAILY   Maribeth Ardon MD

## 2020-10-20 NOTE — PLAN OF CARE
Up with SBA, refusing alarm and helping himself to bathroom. Small PO intake. C/o headache and abdominal pain 8/10. Pain pump in place so tylenol ordered. Neuro consulted, MRI ordered.

## 2020-10-20 NOTE — ED NOTES
"Pt ambulated per MD request. Pt stable on feet with no reported dizziness or lightheadedness. Pt's only complaint is that he still feels \"weak in the knees\". Otherwise stable gait.  "

## 2020-10-20 NOTE — TELEPHONE ENCOUNTER
I will not be able to sign a hard copy until next week. Please send it to another provider.    Elaine

## 2020-10-20 NOTE — PROGRESS NOTES
RECEIVING UNIT ED HANDOFF REVIEW    ED Nurse Handoff Report was reviewed by: Adilia Gaytan RN on October 20, 2020 at 3:56 AM

## 2020-10-20 NOTE — TELEPHONE ENCOUNTER
It is time to refill Erickson's Lyrica through the Pf[employer] assistance program.. Pfizer requires a hand signed, hard copy, brand name script for this fill.    Please hand sign a BRAND name, hard copy script for:       LYRICA    Please send the script to me via interoffice mail FPS Barb Navarro or via US mail  at:      [facility]   Barb Huerta   [address] [place]  [address]    --Please verify the dose of Lyrica.  Epic is showing 3 per day, the script for the application back in February 2020 was for 4 per day--    Thanks so much for your help!    Barb Huerta  Prescription   Pharmacy Assistance  [id number]

## 2020-10-20 NOTE — ED TRIAGE NOTES
Pt reports increased weakness (wobbly legs/knees) for the past 4 days with some elevated blood pressures. Recently increased lisinopril from 5mg to 10mg. Called nurse line and BP was 170s sys and was told to call 911. On antibiotic for leg wound and did not take today because one of the side effects was weakness. Started taking lithium 1 week ago and stopped taking d/t weakness (per MD's instructions) and weakness has continued even though hasn't taking for 1 week.

## 2020-10-20 NOTE — PLAN OF CARE
A/O x4. VSS ex HTN. Received Dilaudid in ED. A second single dose avail. PRN tylenol avail. Denies N/V. Up with SBA. Continent. Neuro consult today. Plan pending.

## 2020-10-21 ENCOUNTER — APPOINTMENT (OUTPATIENT)
Dept: PHYSICAL THERAPY | Facility: CLINIC | Age: 65
End: 2020-10-21
Payer: COMMERCIAL

## 2020-10-21 VITALS
BODY MASS INDEX: 42.38 KG/M2 | TEMPERATURE: 97 F | SYSTOLIC BLOOD PRESSURE: 119 MMHG | HEART RATE: 80 BPM | HEIGHT: 67 IN | OXYGEN SATURATION: 92 % | DIASTOLIC BLOOD PRESSURE: 69 MMHG | RESPIRATION RATE: 16 BRPM | WEIGHT: 270 LBS

## 2020-10-21 PROCEDURE — 83516 IMMUNOASSAY NONANTIBODY: CPT | Mod: 59 | Performed by: PSYCHIATRY & NEUROLOGY

## 2020-10-21 PROCEDURE — 86255 FLUORESCENT ANTIBODY SCREEN: CPT | Performed by: PSYCHIATRY & NEUROLOGY

## 2020-10-21 PROCEDURE — 83516 IMMUNOASSAY NONANTIBODY: CPT | Performed by: PSYCHIATRY & NEUROLOGY

## 2020-10-21 PROCEDURE — 97112 NEUROMUSCULAR REEDUCATION: CPT | Mod: GP

## 2020-10-21 PROCEDURE — 87506 IADNA-DNA/RNA PROBE TQ 6-11: CPT | Performed by: PSYCHIATRY & NEUROLOGY

## 2020-10-21 PROCEDURE — 99217 PR OBSERVATION CARE DISCHARGE: CPT | Performed by: INTERNAL MEDICINE

## 2020-10-21 PROCEDURE — 250N000013 HC RX MED GY IP 250 OP 250 PS 637: Performed by: INTERNAL MEDICINE

## 2020-10-21 PROCEDURE — G0463 HOSPITAL OUTPT CLINIC VISIT: HCPCS

## 2020-10-21 PROCEDURE — 36415 COLL VENOUS BLD VENIPUNCTURE: CPT | Performed by: PSYCHIATRY & NEUROLOGY

## 2020-10-21 PROCEDURE — G0378 HOSPITAL OBSERVATION PER HR: HCPCS

## 2020-10-21 PROCEDURE — 97750 PHYSICAL PERFORMANCE TEST: CPT | Mod: GP

## 2020-10-21 RX ORDER — PREGABALIN 100 MG/1
100 CAPSULE ORAL 3 TIMES DAILY
Qty: 270 CAPSULE | Refills: 1 | Status: SHIPPED | OUTPATIENT
Start: 2020-10-21 | End: 2020-11-02

## 2020-10-21 RX ORDER — AMOXICILLIN 250 MG
1 CAPSULE ORAL DAILY
Status: DISCONTINUED | OUTPATIENT
Start: 2020-10-21 | End: 2020-10-21 | Stop reason: HOSPADM

## 2020-10-21 RX ADMIN — FUROSEMIDE 20 MG: 20 TABLET ORAL at 08:11

## 2020-10-21 RX ADMIN — DOCUSATE SODIUM 50 MG AND SENNOSIDES 8.6 MG 1 TABLET: 8.6; 5 TABLET, FILM COATED ORAL at 09:06

## 2020-10-21 RX ADMIN — ACETAMINOPHEN 975 MG: 325 TABLET, FILM COATED ORAL at 08:51

## 2020-10-21 RX ADMIN — MODAFINIL 300 MG: 200 TABLET ORAL at 08:11

## 2020-10-21 RX ADMIN — TAMSULOSIN HYDROCHLORIDE 0.4 MG: 0.4 CAPSULE ORAL at 08:11

## 2020-10-21 RX ADMIN — CARIPRAZINE 3 MG: 1.5 CAPSULE, GELATIN COATED ORAL at 08:11

## 2020-10-21 RX ADMIN — ACETAMINOPHEN 975 MG: 325 TABLET, FILM COATED ORAL at 02:40

## 2020-10-21 RX ADMIN — PREGABALIN 100 MG: 25 CAPSULE ORAL at 08:11

## 2020-10-21 RX ADMIN — METOPROLOL SUCCINATE 50 MG: 50 TABLET, EXTENDED RELEASE ORAL at 08:11

## 2020-10-21 RX ADMIN — LISINOPRIL 10 MG: 10 TABLET ORAL at 08:11

## 2020-10-21 NOTE — PLAN OF CARE
A&Ox4. VSS on RA ex slight HTN. C/o headache, tylenol given w/ relief. Implanted pain pump controlled by patient. Regular diet, tolerating well. PIV SL. Independently ambulating. Plan to discharge home today.

## 2020-10-21 NOTE — PLAN OF CARE
Pt A&Ox4, sleepy from Ativan IV given prior to MRI, Up with SBA , VSS on RA and LS diminished, and Fentanyl pump presetn - pt self manages, Neuro saw and lumbar MRI done - no acute findings,  at supper. Continue to monitor.

## 2020-10-21 NOTE — CONSULTS
[facility]  WO Nurse Inpatient Wound Assessment     Reason for consultation: Evaluate and treat right ankle      Assessment  Right medial ankle: a couple of weeks ago, pt states he had a pedicure with exfoliation treatment, resulting in inflamed weepy tissue to his ankle area.  Skin is now almost fully intact, with some continued erythema, but pt states it is much improved overall.  Pt has been to his dermatologist once and plans to follow-up again this Friday.  His current plan of care involves prescription eye drops which pt does not have with him.  Will offer some alternative skin/wound cares to follow until he goes home and/or attends his follow-up appt.      Treatment Plan  Right medial ankle: every other day and prn:  1.  Cleanse with MicroKlenz, or mild soap and water  2.  Swab outer periwound area with skin prep, let dry  3.  Apply light layer of Plurogel to an Optifoam Gentle Border 4x4 silicone dressing, and press onto affected area    Pt can resume his prior cares on discharge, if desired, and should follow-up with his Dermatologist as scheduled.     Orders Written  WO Nurse follow-up plan: weekly  Nursing to notify the Provider(s) and re-consult the WO Nurse if wound(s) deteriorates or new skin concern.    Patient History  According to provider note(s):  Parmjit Hernández is a 65 year old male with multiple chronic medical problems including type 2 diabetes mellitus with peripheral neuropathy, morbid obesity, restless legs syndrome, bipolar type I, depression, anxiety, hypertension, dyslipidemia, h/o NSTEMI, stasis dermatitis and lymphedema bilateral legs, chronic back pain with implanted pump and continuous opioid dependence, chronic abdominal pain, obstructive sleep apnea, chronic fatigue syndrome, BPH, history of migraines who was admitted on 10/19/2020 with complaint of leg weakness and inability to walk.    Objective Data    Active Diet Order:  Orders Placed This  Encounter      Regular Diet Adult      Diet    Output:   I/O last 3 completed shifts:  In: 120 [P.O.:120]  Out: -     Risk Assessment:   Sensory Perception: 4-->no impairment  Moisture: 4-->rarely moist  Activity: 3-->walks occasionally  Mobility: 3-->slightly limited  Nutrition: 3-->adequate  Friction and Shear: 3-->no apparent problem  Eloy Score: 20                          Labs:   Recent Labs   Lab 10/19/20  2321   ALBUMIN 3.5   HGB 12.8*   WBC 6.7   CRP 5.2       Physical Exam  Skin inspection: focused right ankle    Wound Location:  Right medial ankle  Date of last photo:  10-21-20      Wound History: see above; exfoliation injury, pt diabetic  Measurements (length x width x depth, in cm):  approx 7 x 10cm area, no depth   Wound Base: dark pink newly intact thin tissue, peely epidermis, mild maceration  Tunneling: N/A  Undermining: N/A  Palpation of the wound bed: normal   Periwound skin: dry/scaly and erythema- blanchable  Color: pink  Temperature: normal   Drainage: none  Description of drainage: none  Odor: none  Pain: mild    Interventions  Current support surface: Standard  Atmos Air mattress  Current off-loading measures: Pillows  Visual inspection of wound(s) completed  Wound Care: done per plan of care  Supplies: gathered  Education provided: plan of care and wound progress  Discussed plan of care with Patient and Nurse    Aggie Jeff RN, CWOCN

## 2020-10-21 NOTE — PROGRESS NOTES
"Physical Therapy Discharge Summary    Reason for therapy discharge:    Discharged to home with outpatient therapy.    Progress towards therapy goal(s). See goals on Care Plan in [facility] electronic health record for goal details.  Goals met    Therapy recommendation(s):    Continued therapy is recommended.  Rationale/Recommendations:  see below. .        10/21/20 2809   Signing Clinician's Name / Credentials   Signing clinician's name / credentials Brittany Dressler, DPT, NCS   Quick Adds   Rehab Discipline PT   Physical Performance Test or Measurement   Minutes of Treatment 10   Symptoms Noted During/After Treatment fatigue;shortness of breath   Treatment Detail DGI 21/24 (low falls risk when >= 20), mild imbalance. No AD needed. MA: clonus B gastrocs ~3beat.    Neuromuscular Re-Education   Minutes of Treatment 10   Symptoms Noted During/After Treatment none   Treatment Detail Coordination: alt toe taps, alt heel taps (SBA technique training) - WFL with pt reporting L side feels slower - pt encouraged to work on. Pt edu on tone to monitor, watchc to ensure calves not getting tight and pt not tipping backwards. Pt edu on graded activity progrsesions in smaller bouts througout the day, limiting fatigue - pt demonstrated understanding.    PT Discharge Planning    PT Discharge Recommendation (DC Rec) home with outpatient pulmonary rehab   PT Rationale for DC Rec No AD needs, OP for balance, energy conservation, activity tolerance progression, monitoring mild gastroc clonus.    PT Brief overview of current status  Anne mobility. DGI 21 - low falls risk wtih mild imbalance. Fatigued with 200' walk and 8 stairs 1-2 rails Anne. Coordination mildly slowed in L LE.    Additional Documentation   PT Plan NA - Pt DC'ing.    [facility] AM-PAC  \"6 Clicks\" V.2 Basic Mobility Inpatient Short Form   1. Turning from your back to your side while in a flat bed without using bedrails? 4 - None   2. Moving from lying on your back to " sitting on the side of a flat bed without using bedrails? 4 - None   3. Moving to and from a bed to a chair (including a wheelchair)? 4 - None   4. Standing up from a chair using your arms (e.g., wheelchair, or bedside chair)? 4 - None   5. To walk in hospital room? 4 - None   6. Climbing 3-5 steps with a railing? 4 - None   Basic Mobility Raw Score (Score out of 24.Lower scores equate to lower levels of function) 24   Total Session Time   Total Session Time (minutes) 20 minutes

## 2020-10-21 NOTE — PROGRESS NOTES
[facility]    Neurology Progress Note    Date of Service (when I saw the patient): 10/21/2020     Today's developments:  MRI L spine shows typical post op changes from the diskectomy in August.  Nothing concerning and nothing acute.  Subjectively he is feeling better.  Ok to discharge from Neuro pov today.    Assessment & Plan   Parmjit Hernández is a 65 year old male who was admitted on 10/19/2020. I was asked to see the patient for generalized weakness.  Muscle strength on formal testing is actually quite good.  Appears to be an issue with early fatiguing.Deconditioning vs weakness due to recent mild illness, vs very mild GBS, vs non-neurologic disorder causing fatigue.       --Possibly very mild Guillain Enterprise syndrome (GBS).  His strength on exam is actually quite good, 5/5 except plantar flexion.    --------At his level of strength, treatment with IVIG is not indicated at this time.    ----------If strength decreases, then would reassess for treatment.    ----------Also will defer LP and EMG at this time given mild symptoms.  ----------Respiratory consult for NIF and FVC.  Done.  Patient had good NIF and FVC.  Currently breathing comfortably  -----------C Jejuni stool test pending.     -- MRI L spine shows typical post op changes from the diskectomy in August.  Nothing concerning and nothing acute.  --Myasthenia panel to eval for possible MG pending- often takes a few days to a week to come back.  I will chart check for results if patient is discharged soon.     --pertinent labs: VPA 14, lithium less than 0.2, ck normal at 89, UA normal, TSH normal, CMP normal, CBC chronic mild anemia and thrombocytopenia.         I discussed the above diagnosis, assessment, testing, and results with the patient.    .     Oralia Sutton MD  [facility]  Office Phone [phone]              Interval History   Feeling a little better today.  Still getting fatigued easily, such as being up  and active an hour today.  But this does feel better than previous.  Lower back pain (chronic) unchanged.  Other pain is better today.      Physical Exam   Temp: 98.5  F (36.9  C) Temp src: Axillary BP: 128/63 Pulse: 69   Resp: 16 SpO2: 100 % O2 Device: Oxymizer cannula    Vitals:    10/19/20 2308   Weight: 122.5 kg (270 lb)     Vital Signs with Ranges  Temp:  [96.1  F (35.6  C)-98.5  F (36.9  C)] 98.5  F (36.9  C)  Pulse:  [66-77] 69  Resp:  [14-16] 16  BP: (111-152)/(60-94) 128/63  SpO2:  [95 %-100 %] 100 %  I/O last 3 completed shifts:  In: 120 [P.O.:120]  Out: -       General Appearance:  No acute distress  Neuro:       Mental Status Exam:    A,A, fully oriented       Cranial Nerves:   EOMI, face equal and symmetric, facial strength normal- eye closure and smile and forehead.  No dysarthria.           Motor:   5/5 BUE and BLE except 4+/5 B plantar flexion- improved today compared to yesterday           Reflexes:  Trace BUE, 1+ B knees, 0 B Achilles       Sensory:  Nl lt x4                  Gait:  Normal, does take a few extra steps on turning, no change from yesterday   CV: RRR nl s1, s2  Resp: CTAB    Extremities: No clubbing, no cyanosis, + chronic BLE edema    Medications     Medication given by intrathecal pump: This is NOT an order to dispense medication. For information only.         ALPRAZolam  0.5 mg Oral At Bedtime     cariprazine  3 mg Oral Daily     divalproex sodium delayed-release  750 mg Oral At Bedtime     furosemide  20 mg Oral Daily     lisinopril  10 mg Oral Daily     metoprolol succinate ER  50 mg Oral Daily     modafinil  300 mg Oral Daily     pregabalin  100 mg Oral TID     senna-docusate  1 tablet Oral Daily     tamsulosin  0.4 mg Oral BID       Data   Results for orders placed or performed during the hospital encounter of 10/19/20 (from the past 24 hour(s))   Lithium level   Result Value Ref Range    Lithium Level <0.20 (L) 0.60 - 1.20 mmol/L   Valproic acid   Result Value Ref Range     Valproic Acid Level 14 (L) 50 - 100 mg/L   MR Lumbar Spine w/o & w Contrast    Narrative    MRI OF THE LUMBAR SPINE WITHOUT AND WITH CONTRAST 10/20/2020 4:06 PM     HISTORY: Leg weakness, history of radiculopathy.    TECHNIQUE: Multiplanar, multisequence MRI images of the lumbar spine  were acquired without and with 12 mL Gadavist IV contrast.    COMPARISON: Lumbar spine MRI 8/29/2018    FINDINGS: There are five lumbar-type vertebrae for the purposes of  this dictation. There is a probable old right-sided L4 laminotomy.    There is normal alignment of the lumbar vertebrae. Vertebral body  heights of the lumbar spine are normal. Marrow signal throughout the  lumbar vertebrae is within normal limits. There is no evidence for  fracture or pathologic bony lesion of the lumbar spine.    There is loss of disc height, disc desiccation and posterior disc  bulging to varying degrees at all levels of the lumbar spine.    The tip of the conus medullaris is at the mid L1 level which is within  normal limits. There is no evidence for intrathecal abnormality. There  is no abnormal intrathecal contrast enhancement.    Level by level:     T12-L1: Normal disc height and signal. Normal facets. No spinal canal  stenosis. No foraminal stenosis on either side. No change from the  comparison study.    L1-L2: Loss of disc height, disc desiccation and mild circumferential  disc bulging. No herniation. Minimal facet arthropathy bilaterally. No  spinal canal stenosis. No foraminal stenosis on either side. No change  from the comparison study.    L1-L2: Loss of disc height, disc desiccation and mild circumferential  disc bulging. No herniation. Minimal facet arthropathy bilaterally. No  spinal canal stenosis. No foraminal stenosis on either side. No change  from the comparison study.    L3-L4: Loss of disc height, disc desiccation and mild circumferential  disc bulging. No herniation. Mild facet arthropathy bilaterally. No  spinal canal  stenosis. No foraminal stenosis on either side. No change  from the comparison study.    L4-L5: Loss of disc height, disc desiccation and moderate  circumferential disc bulging. No herniation. Circumferential endplate  spurring. Moderate facet arthropathy bilaterally. Mild spinal canal  narrowing. Mild bilateral neural foraminal narrowing. This represents  interval resection of the previously visualized posterior disc  herniation with improved spinal canal patency since the preoperative  study.    L5-S1: Loss of disc height, disc desiccation and mild circumferential  disc bulging. No herniation. Severe facet arthropathy bilaterally.  Mild spinal canal narrowing. Mild bilateral neural foraminal  narrowing. No change from the comparison study.      Impression    IMPRESSION:  1. Interval right L4 laminotomy for resection of a large posterior  disc herniation from the L4-L5 disc since the comparison study.  2. Diffuse degenerative changes of the lumbar spine as detailed above.  3. No significant spinal canal or neural foraminal stenosis at any  level of the lumbar spine on the current exam.    AZUCENA LANDAVERDE MD   Glucose by meter   Result Value Ref Range    Glucose 100 (H) 70 - 99 mg/dL     *Note: Due to a large number of results and/or encounters for the requested time period, some results have not been displayed. A complete set of results can be found in Results Review.         Images and Procedures:  I personally reviewed the images of the following studies:  MRI L-spine- Interval L4 Lami and diskectomy, nothing acute.

## 2020-10-21 NOTE — PROGRESS NOTES
Patient discharged at 2:22 PM to home. IV was discontinued. Pain at time of discharge was managed with implanted pain pump. Belongings returned to patient.  Discharge instructions and medications reviewed with patient.  Patient verbalized understanding and all questions were answered.  Prescriptions given to patient.  At time of discharge, patient condition was stable and left the unit via wheelchair escorted by AGUSTÍN.

## 2020-10-21 NOTE — DISCHARGE SUMMARY
[facility]  Hospitalist Discharge Summary      Date of Admission:  10/19/2020  Date of Discharge:  10/21/2020  Discharging Provider: Michael Duval DO      Discharge Diagnoses   1. Lower extremity weakness and generalized fatigue.  Unclear etiology.  Possible GBS  2. DM type II with peripheral neuropathy   3. Stasis dermatitis and lymphedema  4. Morbid obesity  5. Known RLS   6. Anxiety/depression/bipolar disorder  7. HTN   8. HLP   9. Chronic back pain and abdominal pain with pain pump in place   10. NORMA  11. Chronic fatigue syndrome  12. BPH   13. H/o Migraines     Follow-ups Needed After Discharge   Follow-up Appointments     Follow-up and recommended labs and tests       You are scheduled to see Mery Velasquez Nurse Practitioner on Monday, November 10th at 11:00 AM at [facility] [place] [address] [address] [place] [address]   ~34 mi  [phone]         Follow-up and recommended labs and tests       Follow up with primary care provider, Vince Henry, within 2-4 weeks,   for hospital follow- up. No follow up labs or test are needed.  Follow up with neurology as planned           Unresulted Labs Ordered in the Past 30 Days of this Admission     Date and Time Order Name Status Description    10/21/2020 0000 Acetylcholine receptor blocking magdalena In process     10/21/2020 0000 Acetylcholine modulating antibody In process     10/21/2020 0000 Striated muscle antibody IgG In process     10/20/2020 1300 Enteric Bacteria and Virus Panel by CARMEN Stool In process     10/20/2020 1255 Acetylcholine receptor binding In process     10/20/2020 0937 Pregabalin Level: Random In process       These results will be followed up by PCP and neurology    Discharge Disposition   Discharged to home  Condition at discharge: Stable    Hospital Course   Parmjit Hernández is a 65 year old male with multiple chronic medical problems including type 2 diabetes mellitus with peripheral neuropathy,  morbid obesity, restless legs syndrome, bipolar type I, depression, anxiety, hypertension, dyslipidemia, h/o NSTEMI, stasis dermatitis and lymphedema bilateral legs, chronic back pain with implanted pump and continuous opioid dependence, chronic abdominal pain, obstructive sleep apnea, chronic fatigue syndrome, BPH, history of migraines who was admitted on 10/19/2020 with complaint of leg weakness and inability to walk.     Leg weakness, generalized fatigue  Peripheral neuropathy  Polyneuropathy  Stasis dermatitis and lymphedema bilateral legs  Physical deconditioning  Morbid Obesity  Restless Legs Syndrome  Patient reports he can't walk but he has no focal deficits, no demonstrated weakness on exam. Multiple visits to doctors over the past several weeks for similar complaints.  Suspect multifactorial etiology including the peripheral neuropathy associated with diabetes his lymphedema, morbid obesity and generalized deconditioning.  - PTA lyrica  - Neurology consulted.  Labs ordered and are pending as below.  Possible GBS but no significant findings on exam   - PT assessed patient.  Okay for home with outpatient PT      Anxiety  Depression  Bipolar Disorder  Managed PTA with cariprazine, depakote, modafinil, caffeinePreviously took lithium which was recently discontinued.   - PTA medications     Hypertension  Dyslipidemia  Managed PTA with aspirin, atorvastatin, lisinopril, lasix.  - PTA medications      Diabetes, type 2 with peripheral neuropathy  Managed PTA with metformin, glimepiride, sitagliptin. Recent hemoglobin A1c 5.5% from June 2020 indicating reasonable glycemic control.         Consultations This Hospital Stay   NEUROLOGY IP CONSULT  PHYSICAL THERAPY ADULT IP CONSULT  WOUND OSTOMY CONTINENCE NURSE  IP CONSULT    Code Status   Full Code    Time Spent on this Encounter   Michael SPRINGER DO, personally saw the patient today and spent greater than 30 minutes discharging this patient.       Michael AGRAWAL  [place] [address] [facility]  [address]  [place] [address]  Phone: [phone]  ______________________________________________________________________    Physical Exam   Vital Signs: Temp: 97  F (36.1  C) Temp src: Oral BP: 119/69 Pulse: 80   Resp: 16 SpO2: 92 % O2 Device: None (Room air)    Weight: 270 lbs 0 oz  General Appearance: Resting comfortably. NAD   Respiratory: Clear to auscultation.  No respiratory distress  Cardiovascular: RRR.  No obvious murmurs  GI: Bowel sounds present.  Non-tender  Skin: No rashes.  No cyanosis  Other: No edema.  No calf tenderness         Primary Care Physician   Vince Henry    Discharge Orders      Physical Therapy Referral      Follow-up and recommended labs and tests     You are scheduled to see Mery Velasquez Nurse Practitioner on Monday, November 10th at 11:00 AM at [facility] [place]   [address], [place] [address]   ~34 mi  [phone]     Reason for your hospital stay    Lower extremity weakness     Follow-up and recommended labs and tests     Follow up with primary care provider, Vince Henry, within 2-4 weeks, for hospital follow- up. No follow up labs or test are needed.  Follow up with neurology as planned     Activity    Your activity upon discharge: activity as tolerated     Diet    Follow this diet upon discharge: Orders Placed This Encounter      Regular Diet Adult       Significant Results and Procedures   Most Recent 3 CBC's:  Recent Labs   Lab Test 10/19/20  2321 08/17/20  0132 07/27/20  1432   WBC 6.7 4.9 6.7   HGB 12.8* 10.6* 11.8*   MCV 92 95 98   * 133* 103*     Most Recent 3 BMP's:  Recent Labs   Lab Test 10/19/20  2321 10/13/20  1122 08/24/20  1031    139 141   POTASSIUM 3.9 4.0 4.2   CHLORIDE 104 109 111*   CO2 25 19* 21   BUN 24 33* 28   CR 1.05 1.23 1.17   ANIONGAP 6 11 9   LUIS 8.7 9.5 8.4*   GLC 96 237* 120*   ,   Results for orders placed or performed during the  hospital encounter of 10/19/20   Chest XR,  PA & LAT    Narrative    EXAM: XR CHEST 2 VW  LOCATION: [facility]  DATE/TIME: 10/20/2020 12:21 AM    INDICATION: Weakness, elevated troponin  COMPARISON: 04/20/2020      Impression    IMPRESSION: Heart size and pulmonary vascularity normal. Minimal scarring or atelectasis left base, lungs otherwise clear and unchanged.   MR Lumbar Spine w/o & w Contrast    Narrative    MRI OF THE LUMBAR SPINE WITHOUT AND WITH CONTRAST 10/20/2020 4:06 PM     HISTORY: Leg weakness, history of radiculopathy.    TECHNIQUE: Multiplanar, multisequence MRI images of the lumbar spine  were acquired without and with 12 mL Gadavist IV contrast.    COMPARISON: Lumbar spine MRI 8/29/2018    FINDINGS: There are five lumbar-type vertebrae for the purposes of  this dictation. There is a probable old right-sided L4 laminotomy.    There is normal alignment of the lumbar vertebrae. Vertebral body  heights of the lumbar spine are normal. Marrow signal throughout the  lumbar vertebrae is within normal limits. There is no evidence for  fracture or pathologic bony lesion of the lumbar spine.    There is loss of disc height, disc desiccation and posterior disc  bulging to varying degrees at all levels of the lumbar spine.    The tip of the conus medullaris is at the mid L1 level which is within  normal limits. There is no evidence for intrathecal abnormality. There  is no abnormal intrathecal contrast enhancement.    Level by level:     T12-L1: Normal disc height and signal. Normal facets. No spinal canal  stenosis. No foraminal stenosis on either side. No change from the  comparison study.    L1-L2: Loss of disc height, disc desiccation and mild circumferential  disc bulging. No herniation. Minimal facet arthropathy bilaterally. No  spinal canal stenosis. No foraminal stenosis on either side. No change  from the comparison study.    L1-L2: Loss of disc height, disc desiccation and mild  circumferential  disc bulging. No herniation. Minimal facet arthropathy bilaterally. No  spinal canal stenosis. No foraminal stenosis on either side. No change  from the comparison study.    L3-L4: Loss of disc height, disc desiccation and mild circumferential  disc bulging. No herniation. Mild facet arthropathy bilaterally. No  spinal canal stenosis. No foraminal stenosis on either side. No change  from the comparison study.    L4-L5: Loss of disc height, disc desiccation and moderate  circumferential disc bulging. No herniation. Circumferential endplate  spurring. Moderate facet arthropathy bilaterally. Mild spinal canal  narrowing. Mild bilateral neural foraminal narrowing. This represents  interval resection of the previously visualized posterior disc  herniation with improved spinal canal patency since the preoperative  study.    L5-S1: Loss of disc height, disc desiccation and mild circumferential  disc bulging. No herniation. Severe facet arthropathy bilaterally.  Mild spinal canal narrowing. Mild bilateral neural foraminal  narrowing. No change from the comparison study.      Impression    IMPRESSION:  1. Interval right L4 laminotomy for resection of a large posterior  disc herniation from the L4-L5 disc since the comparison study.  2. Diffuse degenerative changes of the lumbar spine as detailed above.  3. No significant spinal canal or neural foraminal stenosis at any  level of the lumbar spine on the current exam.    AZUCENA LANDAVERDE MD     *Note: Due to a large number of results and/or encounters for the requested time period, some results have not been displayed. A complete set of results can be found in Results Review.       Discharge Medications   Current Discharge Medication List      CONTINUE these medications which have NOT CHANGED    Details   acetaminophen (TYLENOL) 325 MG tablet Take 2 tablets (650 mg) by mouth every 4 hours as needed for mild pain  Qty:      Associated Diagnoses: Generalized  muscle weakness      alpha-lipoic acid 100 MG capsule Take 200 mg by mouth daily      ALPRAZolam (XANAX) 0.5 MG tablet Take 0.5 mg by mouth At Bedtime      aspirin (ASPIRIN LOW DOSE) 81 MG tablet Take 1 tablet (81 mg) by mouth daily  Qty: 30 tablet, Refills: 3    Associated Diagnoses: Hyperlipidemia LDL goal <100; Hypertension goal BP (blood pressure) < 140/90      atorvastatin (LIPITOR) 20 MG tablet Take 1 tablet (20 mg) by mouth daily  Qty: 90 tablet, Refills: 1    Associated Diagnoses: Hyperlipidemia LDL goal <100      caffeine (NO-DOZE) 200 MG TABS tablet Take 400 mg by mouth daily      cariprazine (VRAYLAR) 3 MG CAPS capsule Take 3 mg by mouth daily       divalproex sodium delayed-release (DEPAKOTE) 500 MG DR tablet Take 750 mg by mouth At Bedtime   Qty: 10 tablet, Refills: 0    Comments: Under care of Dr KING ([place] psychistrist)  Associated Diagnoses: Mood disorder (H)      furosemide (LASIX) 20 MG tablet Take 1 tablet (20 mg) by mouth daily  Qty: 30 tablet, Refills: 0    Associated Diagnoses: Hypertension goal BP (blood pressure) < 140/90; Acquired lymphedema      glimepiride (AMARYL) 1 MG tablet Take 1 tablet (1 mg) by mouth every morning (before breakfast)  Qty: 90 tablet, Refills: 0    Associated Diagnoses: Type 2 diabetes mellitus with other specified complication, without long-term current use of insulin (H)      lisinopril (ZESTRIL) 10 MG tablet Take 10 mg by mouth daily      modafinil (PROVIGIL) 200 MG tablet Take 300 mg by mouth daily       Multiple Vitamins-Minerals (SENIOR MULTIVITAMIN PLUS PO) Take 1 tablet by mouth daily      NONFORMULARY by Intrathecal route continuous - + up to 4 boluses daily (100mcg each bolus usually once or 2x per day)    Managed by Dr Pawan Shaw [facility]     Medications in Pump:  fentanyl 2000mcg/mL  Bupivacaine 20mg/mL  Morphine 5.8mg/mL     Rate:   Fentanyl 1200mcg/day  Bupivacaine 12mg/day  Morphine 4.2mg/day  Pump Last Fill Date:  4/16/2020      omega-3  acid ethyl esters (LOVAZA) 1 g capsule Take 1 capsule (1 g) by mouth daily  Qty: 90 capsule, Refills: 3    Associated Diagnoses: Hyperlipidemia LDL goal <100      senna-docusate (SENOKOT-S/PERICOLACE) 8.6-50 MG tablet Take 1 tablet by mouth daily Take 1 tablet in the morning and 2 tablets in the evening.       sitagliptin (JANUVIA) 100 MG tablet Take 1 tablet (100 mg) by mouth daily  Qty: 90 tablet, Refills: 0    Associated Diagnoses: Type 2 diabetes mellitus with other specified complication, without long-term current use of insulin (H)      SUMAtriptan (IMITREX) 50 MG tablet Take 1 tablet (50 mg) by mouth at onset of headache for migraine May repeat in 2 hours. Max 4 tablets/24 hours.  Qty: 8 tablet, Refills: 1    Associated Diagnoses: Mixed common migraine and muscle contraction headache      tamsulosin (FLOMAX) 0.4 MG capsule Take 1 capsule (0.4 mg) by mouth 2 times daily  Qty: 30 capsule, Refills: 3    Associated Diagnoses: Urinary retention with incomplete bladder emptying      Turmeric 500 MG CAPS Take 1,200 mg by mouth daily       vitamin C (ASCORBIC ACID) 1000 MG TABS Take 1,000 mg by mouth daily      blood glucose (ACCU-CHEK COMPACT DRUM) test strip Use to test blood sugars 3 to 4 times daily.  Qty: 400 strip, Refills: 1    Associated Diagnoses: Type 2 diabetes mellitus without complication, without long-term current use of insulin (H)      blood glucose monitoring (ACCU-CHEK COMPACT CARE KIT) meter device kit Use to test blood sugars 3 to 4  times daily.  Qty: 1 kit, Refills: 0    Associated Diagnoses: Type 2 diabetes mellitus without complication, without long-term current use of insulin (H)      blood glucose monitoring (SOFTCLIX) lancets USE TO TEST BLOOD SUGAR 3-4 TIMES DAILY OR AS DIRECTED.  Refills: 1      metFORMIN (GLUCOPHAGE-XR) 500 MG 24 hr tablet Take 1000mg in AM and 1000mg in PM  Qty: 360 tablet, Refills: 1    Associated Diagnoses: Type 2 diabetes mellitus with other specified complication,  without long-term current use of insulin (H)      metoprolol succinate ER (TOPROL-XL) 50 MG 24 hr tablet TAKE 1 TABLET(50 MG) BY MOUTH DAILY  Qty: 90 tablet, Refills: 1    Associated Diagnoses: Hypertension goal BP (blood pressure) < 140/90      pregabalin (LYRICA) 100 MG capsule Take 1 capsule (100 mg) by mouth 3 times daily  Qty: 270 capsule, Refills: 1    Associated Diagnoses: Peripheral polyneuropathy; Polyneuropathy associated with underlying disease (H)           Allergies   Allergies   Allergen Reactions     Cephalexin Diarrhea     Liraglutide Other (See Comments)     Sulfa Drugs Swelling     Pt has taken  Taken sulfa drugs orally without trouble. He had problems with Sulfa eye drops. Eye swelled up     Victoza      Increased migraine frequency and severity

## 2020-10-21 NOTE — PROVIDER NOTIFICATION
MD Notification    Notified Person: MD    Notified Person Name: Iker    Notification Date/Time: 10/21/20, 5:56 AM    Notification Interaction: text page    Purpose of Notification:  Pt requesting wound care r/t R leg wound he had been tx at home. States was prescribed eye drops and triple abx cream at home for the wound care. Can we get a wound consult?    Orders Received: wound consult ordered    Comments:

## 2020-10-21 NOTE — PLAN OF CARE
Shift Note:   A/O x4. VSS. PRN tylenol given x1 for c/o sore throat. Denies N/V. PIV saline locked. Up with SBA. Plan likely to discharge today pending final lab work-up, open to TCU, if suggested.

## 2020-10-22 ENCOUNTER — PATIENT OUTREACH (OUTPATIENT)
Dept: CARE COORDINATION | Facility: CLINIC | Age: 65
End: 2020-10-22

## 2020-10-22 ENCOUNTER — TELEPHONE (OUTPATIENT)
Dept: FAMILY MEDICINE | Facility: CLINIC | Age: 65
End: 2020-10-22

## 2020-10-22 LAB — ACHR BIND AB SER-SCNC: 0 NMOL/L (ref 0–0.4)

## 2020-10-22 NOTE — PROGRESS NOTES
Clinic Care Coordination Contact    Situation: Patient chart reviewed by care coordinator.    Background: Pt admitted 10/19/20 to 10/21/20 with bilateral low leg weakness.     Assessment:  Pt had post hospital follow up visit with provider today.      Plan/Recommendations: Pt to remain in maintenance. CHW set outreach for 1 month.  If no new needs identified at next outreach send chart to CC to review for graduation.

## 2020-10-22 NOTE — TELEPHONE ENCOUNTER
Hospital F/U 10/21/2020 DX:Bilateral Leg Weakness ED/IP 2/1    [phone] (home) [phone] (work)

## 2020-10-23 LAB
ACHR BLOCK AB/ACHR TOTAL SFR SER: 10 % (ref 0–26)
COLLECT TME SPEC: NORMAL
MEDICATION DOSE-MCNC: NORMAL
MEDICATION ROUTE DOSE: NORMAL
PREGABALIN DOSE FREQUENCY: NORMAL
PREGABALIN SERPL-MCNC: 0.9 UG/ML

## 2020-10-24 LAB — STRIA MUS IGG SER QL IF: NORMAL

## 2020-10-26 ENCOUNTER — OFFICE VISIT (OUTPATIENT)
Dept: FAMILY MEDICINE | Facility: CLINIC | Age: 65
End: 2020-10-26
Payer: COMMERCIAL

## 2020-10-26 VITALS
WEIGHT: 257 LBS | HEART RATE: 74 BPM | BODY MASS INDEX: 40.34 KG/M2 | SYSTOLIC BLOOD PRESSURE: 109 MMHG | DIASTOLIC BLOOD PRESSURE: 73 MMHG | RESPIRATION RATE: 17 BRPM | OXYGEN SATURATION: 97 % | HEIGHT: 67 IN | TEMPERATURE: 98.5 F

## 2020-10-26 DIAGNOSIS — G61.0 GUILLAIN-BARRE SYNDROME (H): Primary | ICD-10-CM

## 2020-10-26 PROCEDURE — 99214 OFFICE O/P EST MOD 30 MIN: CPT | Performed by: FAMILY MEDICINE

## 2020-10-26 ASSESSMENT — MIFFLIN-ST. JEOR: SCORE: 1914.37

## 2020-10-26 NOTE — PROGRESS NOTES
"Subjective     Parmjit Hernández is a 65 year old male who presents to clinic today for the following health issues:    \[facility]\""           Hospital Follow-up Visit:    Hospital/Nursing Home/IP Rehab Facility: [facility]  Date of Admission: 10/19/2020  Date of Discharge: 10/21/2020  Reason(s) for Admission: Lower extremity weakness and fatigue      Was your hospitalization related to COVID-19? No   Problems taking medications regularly:  None  Medication changes since discharge: None  Problems adhering to non-medication therapy:  None    Summary of hospitalization:  [facility]  Diagnostic Tests/Treatments reviewed.  Follow up needed: none  Other Healthcare Providers Involved in Patient s Care:         None  Update since discharge: improved. Post Discharge Medication Reconciliation: discharge medications reconciled, continue medications without change.  Plan of care communicated with patient     The patient was seen in the ER for severe weakness, unsteady walking, double vision. He was diagnosed with Guillain [place] syndrome. He was informed that it was mild and there is no need for any intervention at this time.  His symptoms were consistent with Lala Barré.    His blood glucose was at 53. Increased to 98 after taking sugar tablets.      Review of Systems   Constitutional, HEENT, cardiovascular, pulmonary, gi and gu systems are negative, except as otherwise noted.      Objective    /73   Pulse 74   Temp 98.5  F (36.9  C) (Tympanic)   Resp 17   Ht 1.702 m (5' 7\")   Wt 116.6 kg (257 lb)   SpO2 97%   BMI 40.25 kg/m    Body mass index is 40.25 kg/m .  Physical Exam   GENERAL: healthy, alert and no distress  RESP: lungs clear to auscultation - no rales, rhonchi or wheezes  CV: regular rate and rhythm, normal S1 S2, no S3 or S4, no murmur, click or rub, no peripheral edema and peripheral pulses strong    No results found for any visits on 10/26/20.        Assessment " & Plan     Guillain-[place] syndrome (H)  -Discharge papers from recent emergency room visit were reviewed.  The patient was advised to continue with conservative management.  I would recommend avoiding the flu vaccine in the future because of his recent diagnosis of GB.        Return in about 2 weeks (around 11/9/2020) for Follow-up visit.    Vince Henry MD  [facility]

## 2020-10-28 ENCOUNTER — TRANSFERRED RECORDS (OUTPATIENT)
Dept: HEALTH INFORMATION MANAGEMENT | Facility: CLINIC | Age: 65
End: 2020-10-28

## 2020-10-29 ENCOUNTER — TRANSFERRED RECORDS (OUTPATIENT)
Dept: HEALTH INFORMATION MANAGEMENT | Facility: CLINIC | Age: 65
End: 2020-10-29

## 2020-10-31 ENCOUNTER — MYC MEDICAL ADVICE (OUTPATIENT)
Dept: FAMILY MEDICINE | Facility: CLINIC | Age: 65
End: 2020-10-31

## 2020-11-02 ENCOUNTER — TRANSFERRED RECORDS (OUTPATIENT)
Dept: HEALTH INFORMATION MANAGEMENT | Facility: CLINIC | Age: 65
End: 2020-11-02

## 2020-11-02 ENCOUNTER — MYC MEDICAL ADVICE (OUTPATIENT)
Dept: FAMILY MEDICINE | Facility: CLINIC | Age: 65
End: 2020-11-02

## 2020-11-02 RX ORDER — PREGABALIN 100 MG/1
100 CAPSULE ORAL 4 TIMES DAILY
Qty: 360 CAPSULE | Refills: 3 | Status: SHIPPED | OUTPATIENT
Start: 2020-11-02 | End: 2021-05-11

## 2020-11-02 NOTE — TELEPHONE ENCOUNTER
FS,    Please see message below.  Will forward 10/20 medication request.    Triage routed to another provider due to your absence.  Message was not addressed    Thanks,  Wandy Pena RN

## 2020-11-02 NOTE — TELEPHONE ENCOUNTER
Rx printed and signed.     Message sent to TC to be mailed to pharmacy. ATTN: Barb Fernandezjoseyuly    Dose is 4 times daily. Patient reports that he was told to take it 4 times daily by his pain clinic.     Elaine

## 2020-11-02 NOTE — TELEPHONE ENCOUNTER
I am still waiting for the Lyrica dose increase script.    Barb Huerta  Prescription   Pharmacy Assistance  [phone]

## 2020-11-02 NOTE — TELEPHONE ENCOUNTER
Dear Dm  Although there is less than 1 case of Guillain [place] Syndrome per 1 million flu shot given. You developed symptoms of GBS 10 days after the flu vaccine. I agree, you should avoid it.     Elaine

## 2020-11-03 ENCOUNTER — HOSPITAL ENCOUNTER (OUTPATIENT)
Dept: PHYSICAL THERAPY | Facility: CLINIC | Age: 65
Setting detail: THERAPIES SERIES
End: 2020-11-03
Attending: INTERNAL MEDICINE
Payer: COMMERCIAL

## 2020-11-03 DIAGNOSIS — E11.69 TYPE 2 DIABETES MELLITUS WITH OTHER SPECIFIED COMPLICATION, WITHOUT LONG-TERM CURRENT USE OF INSULIN (H): ICD-10-CM

## 2020-11-03 DIAGNOSIS — M62.81 GENERALIZED MUSCLE WEAKNESS: ICD-10-CM

## 2020-11-03 LAB — ACHR MOD AB/ACHR TOTAL SFR SER: 0 %

## 2020-11-03 PROCEDURE — 97161 PT EVAL LOW COMPLEX 20 MIN: CPT | Mod: GP | Performed by: PHYSICAL THERAPIST

## 2020-11-03 NOTE — TELEPHONE ENCOUNTER
Prescription approved per [facility] Refill Protocol.  Stacie MAYA RN

## 2020-11-04 NOTE — PROGRESS NOTES
11/03/20 0839   Quick Adds   Quick Adds Certification   Type of Visit Initial OP PT Evaluation   General Information   Start of Care Date 11/03/20   Referring Physician Michael Duval,     Orders Evaluate and Treat as Indicated   Order Date 10/21/20   Medical Diagnosis Generalized muscle weakness M62.81    Onset of illness/injury or Date of Surgery 10/19/20   Precautions/Limitations no known precautions/limitations   Surgical/Medical history reviewed Yes   Pertinent history of current problem (include personal factors and/or comorbidities that impact the POC) Pt presents to PT to address weakness and impaired mobility following hospitalization 10/19-10/21 for progressive LE weakness, instability and polyneuropathy.  Diagnosed with likely GBS after recieving his flu vaccination ~10 days prior.  Did undergo lumbar laminectomy 08/2020 with minimal improvement in pain, considering RF nerve ablation, has OP PT orders for ortho PT ([facility] eval later this week) as well.  PMH:  Chronic pain (has pain pump for chronic abdominal pain), bipolar, anxiety, depression, DMII, lymphedema, morbid obesity, chronic fatigue syndrome.  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    Pertinent Visual History  pt denies any visual changes with GBS diagnosis   Prior level of function comment Independent PLOF, leads a fairly sedentary lifestyle.  Has recently started hiring out his laundry to be done. Orders out food often.  Limited by chronic back pain prior to GBS diagnosis, was limited in walking tolerance, considered using a power scooter for longer distances at airport, etc.    Patient role/Employment history Employed  (, desk job)   Living environment Apartment/condo   Home/Community Accessibility Comments 2 br apt, no stairs   Assistive Devices Comments none, states he did not use any AD during hospitalization or upon dc home.  States he owns  a FWW he used for a short term following spine surgery earlier this summer.   Patient/Family Goals Statement manage his back pain and resume a more active lifestyle, walking with improved tolerance   Fall Risk Screen   Fall screen completed by PT   Have you fallen 2 or more times in the past year? No   Have you fallen and had an injury in the past year? No   Is patient a fall risk? No   Fall screen comments low fall risk per 11/12 4-item DGI score   Abuse Screen (yes response referral indicated)   Feels Unsafe at Home or Work/School no   Feels Threatened by Someone no   Does Anyone Try to Keep You From Having Contact with Others or Doing Things Outside Your Home? no   Physical Signs of Abuse Present no   Pain   Patient currently in pain No   Pain comments Progressive low back pain with standing/walking, minimal pain at rest with sitting/lying.  R>L LBP.   Cognitive Status Examination   Orientation orientation to person, place and time   Integumentary   Integumentary No deficits were identified   Posture   Posture Comments mild FH posture, tends to walk with forward flexed posture   Range of Motion (ROM)   ROM Comment WFL all extremities   Strength   Strength Comments WFL and grossly symmetrical strength all extremities.  5/5 distally B DF, knee ext/flx, shoulder flexion, elbow flexion.  Mild proximal hip weakness/deconditioning: seated R hip flexion 5/5, L 4+/5, sidelying hip ABD L 4/5, R 4+/5.   Bed Mobility   Bed Mobility Comments Independent   Transfer Skills   Transfer Comments Independent, minimal use of hands   Gait   Gait Comments Ambulates with mildly slowed gait speed, WBOS, reduced push off and arm swing/trunk rotation.  All of which patient reports prior to GBS diagnosis, due to chronic back pain and overall deconditioning.  No AD used, reciprocal gait pattern.   Gait Special Tests   Gait Special Tests DYNAMIC GAIT INDEX   Gait Special Tests Dynamic Gait Index   Comments 11/12 4-item DGI, low fall risk.  Appears at baseline, mild impairment due to chronic back    Balance   Balance Comments impaired static standing balance EC and conforming surfaces, pt indicates baseline impairment related to chemo induced neuropathy, chronic back pain.   Balance Special Tests   Balance Special Tests Modified CTSIB Conditions;Sit to stand reps   Balance Special Tests Modified CTSIB Conditions   Condition 1, seconds 30 Seconds   Condition 2, seconds 10 Seconds  (pt opening eyes, states he is swaying)   Condition 4, seconds 30 Seconds  (mild sway)   Condition 5, seconds 5 Seconds  (moderate sway, pt opening eyes)   Modified CTSIB Comments challenged with EC and conforming surfaces, appears at baseline due to chronic LBP and neuropathy   Balance Special Tests Sit to Stand Reps in 30 Seconds   Reps in 30 seconds 11   Height 18   Comments limited by fatigue, denies pain.   Sensory Examination   Sensory Perception Comments baseline/chronic chemo induced peripheral neuropathy in feet.  Worsened sensory input during GBS diagnosis, but subjectively reports has recovered back to baseline status. Intact to gross/light touch in B feet.   Coordination   Coordination no deficits were identified   Clinical Impression   Criteria for Skilled Therapeutic Interventions Met evaluation only;no problems identified which require skilled intervention;current level of function same as previous level of function;no significant expected improvement in functional status   Clinical Presentation Stable/Uncomplicated   Clinical Presentation Rationale improving status, indep PLOF, multiple body systems involved   Clinical Decision Making (Complexity) Low complexity   Risk & Benefits of therapy have been explained Yes   Patient, Family & other staff in agreement with plan of care Yes   Clinical Impression Comments Patient demonstrates WFL and grossly symmetrical strength in UEs and LEs, some evidence of deconditioning and dynamic balance concerns, but likely  contributed moreso by chronic LBP and neuropathy secondary to chemo treatments.  Appears focal deficits from GBS diagnosis have mostly resolved. Will benefit from ongoing skilled PT intervention through orthopedic clinic to address LBP management and progressive return to endurance/aerobic exercise for pain management, strengthening, and weight management.  Did introduce patient to a recumbant stepper machine, which he appeared to tolerate well.  No need for further skilled PT services through our clinic as patient is independent with all mobility/ADLs skills and is back at his recent baseline functional status.   Education Assessment   Barriers to Learning No barriers   Total Evaluation Time   PT Ingrid, Low Complexity Minutes ([id number]) 46

## 2020-11-05 ENCOUNTER — TELEPHONE (OUTPATIENT)
Dept: FAMILY MEDICINE | Facility: CLINIC | Age: 65
End: 2020-11-05

## 2020-11-10 ENCOUNTER — MYC MEDICAL ADVICE (OUTPATIENT)
Dept: FAMILY MEDICINE | Facility: CLINIC | Age: 65
End: 2020-11-10

## 2020-11-10 ENCOUNTER — NURSE TRIAGE (OUTPATIENT)
Dept: NURSING | Facility: CLINIC | Age: 65
End: 2020-11-10

## 2020-11-10 DIAGNOSIS — G62.9 PERIPHERAL POLYNEUROPATHY: ICD-10-CM

## 2020-11-10 RX ORDER — PREGABALIN 100 MG/1
100 CAPSULE ORAL 4 TIMES DAILY
Qty: 360 CAPSULE | Refills: 3 | Status: CANCELLED | OUTPATIENT
Start: 2020-11-10

## 2020-11-10 NOTE — TELEPHONE ENCOUNTER
Found signed hard copy, was erroneously faxed to [facility].    Forwarded to Barb Huerta via interoffice mail.    Anca Dallas RN

## 2020-11-10 NOTE — TELEPHONE ENCOUNTER
--This is my 2nd request--    1st request was October 20, 2020.    It is time to refill Erickson's Lyrica throught the Pfizer assistance program.  He has also had a dose increase.  To process this fill Pfizer requires a hand signed, hard copy, brand name script.    Please hand sign a BRAND name, hard copy script for:     LYRICA 100mg qid    Please send the HARD COPY script to me via interoffice mail FPS Barb Navarro or via [place] mail  at:      [facility]   Barb Huerta   [address] [place]  [address]    Please send this script ASAP!  Time is short for our [facility] patients.    Thanks so much for your help!    Barb Huerta  Prescription   Pharmacy Assistance  [phone]

## 2020-11-11 ENCOUNTER — OFFICE VISIT (OUTPATIENT)
Dept: FAMILY MEDICINE | Facility: CLINIC | Age: 65
End: 2020-11-11
Payer: COMMERCIAL

## 2020-11-11 ENCOUNTER — HOSPITAL ENCOUNTER (EMERGENCY)
Facility: CLINIC | Age: 65
Discharge: HOME | DRG: 281 | End: 2020-11-11
Attending: EMERGENCY MEDICINE | Admitting: EMERGENCY MEDICINE
Payer: COMMERCIAL

## 2020-11-11 VITALS
BODY MASS INDEX: 42.85 KG/M2 | SYSTOLIC BLOOD PRESSURE: 159 MMHG | HEART RATE: 77 BPM | WEIGHT: 273 LBS | TEMPERATURE: 98.7 F | DIASTOLIC BLOOD PRESSURE: 95 MMHG | RESPIRATION RATE: 16 BRPM | HEIGHT: 67 IN | OXYGEN SATURATION: 96 %

## 2020-11-11 VITALS
OXYGEN SATURATION: 95 % | SYSTOLIC BLOOD PRESSURE: 119 MMHG | TEMPERATURE: 97.7 F | BODY MASS INDEX: 42.76 KG/M2 | RESPIRATION RATE: 16 BRPM | WEIGHT: 273 LBS | HEART RATE: 79 BPM | DIASTOLIC BLOOD PRESSURE: 76 MMHG

## 2020-11-11 DIAGNOSIS — I10 HYPERTENSION GOAL BP (BLOOD PRESSURE) < 140/90: Primary | ICD-10-CM

## 2020-11-11 DIAGNOSIS — I10 ESSENTIAL HYPERTENSION: ICD-10-CM

## 2020-11-11 DIAGNOSIS — R07.9 CHEST PAIN, UNSPECIFIED TYPE: ICD-10-CM

## 2020-11-11 LAB
ALBUMIN SERPL-MCNC: 3.8 G/DL (ref 3.4–5)
ALP SERPL-CCNC: 93 U/L (ref 40–150)
ALT SERPL W P-5'-P-CCNC: 54 U/L (ref 0–70)
ANION GAP SERPL CALCULATED.3IONS-SCNC: 6 MMOL/L (ref 3–14)
AST SERPL W P-5'-P-CCNC: 23 U/L (ref 0–45)
BASOPHILS # BLD AUTO: 0 10E9/L (ref 0–0.2)
BASOPHILS NFR BLD AUTO: 0.5 %
BILIRUB SERPL-MCNC: 0.3 MG/DL (ref 0.2–1.3)
BUN SERPL-MCNC: 20 MG/DL (ref 7–30)
CALCIUM SERPL-MCNC: 8.6 MG/DL (ref 8.5–10.1)
CHLORIDE SERPL-SCNC: 106 MMOL/L (ref 94–109)
CO2 SERPL-SCNC: 25 MMOL/L (ref 20–32)
CREAT SERPL-MCNC: 1.05 MG/DL (ref 0.66–1.25)
DIFFERENTIAL METHOD BLD: ABNORMAL
EOSINOPHIL # BLD AUTO: 0.2 10E9/L (ref 0–0.7)
EOSINOPHIL NFR BLD AUTO: 3.6 %
ERYTHROCYTE [DISTWIDTH] IN BLOOD BY AUTOMATED COUNT: 13.8 % (ref 10–15)
GFR SERPL CREATININE-BSD FRML MDRD: 74 ML/MIN/{1.73_M2}
GLUCOSE SERPL-MCNC: 88 MG/DL (ref 70–99)
HCT VFR BLD AUTO: 37.2 % (ref 40–53)
HGB BLD-MCNC: 12.9 G/DL (ref 13.3–17.7)
IMM GRANULOCYTES # BLD: 0 10E9/L (ref 0–0.4)
IMM GRANULOCYTES NFR BLD: 0.3 %
LYMPHOCYTES # BLD AUTO: 0.8 10E9/L (ref 0.8–5.3)
LYMPHOCYTES NFR BLD AUTO: 13.7 %
MCH RBC QN AUTO: 32.3 PG (ref 26.5–33)
MCHC RBC AUTO-ENTMCNC: 34.7 G/DL (ref 31.5–36.5)
MCV RBC AUTO: 93 FL (ref 78–100)
MONOCYTES # BLD AUTO: 0.4 10E9/L (ref 0–1.3)
MONOCYTES NFR BLD AUTO: 6.4 %
NEUTROPHILS # BLD AUTO: 4.5 10E9/L (ref 1.6–8.3)
NEUTROPHILS NFR BLD AUTO: 75.5 %
NRBC # BLD AUTO: 0 10*3/UL
NRBC BLD AUTO-RTO: 0 /100
PLATELET # BLD AUTO: 125 10E9/L (ref 150–450)
POTASSIUM SERPL-SCNC: 4.1 MMOL/L (ref 3.4–5.3)
PROT SERPL-MCNC: 6.6 G/DL (ref 6.8–8.8)
RBC # BLD AUTO: 4 10E12/L (ref 4.4–5.9)
SODIUM SERPL-SCNC: 137 MMOL/L (ref 133–144)
TROPONIN I SERPL-MCNC: 0.05 UG/L (ref 0–0.04)
WBC # BLD AUTO: 5.9 10E9/L (ref 4–11)

## 2020-11-11 PROCEDURE — 85025 COMPLETE CBC W/AUTO DIFF WBC: CPT | Performed by: EMERGENCY MEDICINE

## 2020-11-11 PROCEDURE — 99214 OFFICE O/P EST MOD 30 MIN: CPT | Performed by: FAMILY MEDICINE

## 2020-11-11 PROCEDURE — 84484 ASSAY OF TROPONIN QUANT: CPT | Performed by: EMERGENCY MEDICINE

## 2020-11-11 PROCEDURE — 80053 COMPREHEN METABOLIC PANEL: CPT | Performed by: EMERGENCY MEDICINE

## 2020-11-11 PROCEDURE — 99284 EMERGENCY DEPT VISIT MOD MDM: CPT | Mod: 25

## 2020-11-11 PROCEDURE — 93005 ELECTROCARDIOGRAM TRACING: CPT

## 2020-11-11 RX ORDER — LISINOPRIL 10 MG/1
15 TABLET ORAL DAILY
Qty: 135 TABLET | Refills: 0 | Status: ON HOLD | OUTPATIENT
Start: 2020-11-11 | End: 2020-11-13

## 2020-11-11 RX ORDER — ASPIRIN 81 MG/1
81 TABLET, CHEWABLE ORAL DAILY
Qty: 90 TABLET | Refills: 3 | Status: SHIPPED | OUTPATIENT
Start: 2020-11-11 | End: 2020-11-12

## 2020-11-11 ASSESSMENT — MIFFLIN-ST. JEOR: SCORE: 1986.95

## 2020-11-11 NOTE — TELEPHONE ENCOUNTER
"Patient reports his blood pressure is 205/119. He takes Lisinopril 10 mg in AM and Metoprolol 50 mg at HS-(took Metoprolol about 1 hour ago)    He was feeling lightheaded. States he doesn't feel terrible. Thinks he is having an anxiety attack about the readings.    Feels \"a little discomfort in my chest, not pain, but a little discomfort\"  Edema in legs which is worsening. States he sent a message to his doctor to see if his Lasix should be increased?    Patient retook his BP, and the reading was 198/110.  Patient again denies chest pain, but states it was just a discomfort in his chest, a \"twinge\"    Advised per protocol, that for a blood pressure at his reading, with any cardiac/or neurological symptoms- (chest pain, difficulty breathing, unsteady gait, blurred vision, severe headache)- we advise to be seen in the emergency department. Patient declined going into the ED.     Patient opted to schedule an appointment with his clinic tomorrow. Patient was on Mychart during the phone call and scheduled an appointment with his PCP for 11/11/2020.    Patient states he will \"think\" about my recommendations and \"pay attention to his body.\"     Patient states he will monitor his BP and if his BP rises or if his chest discomfort worsens, he will call 911, because he doesn't want to drive himself into the ED due to the snow outside.      Princess Carvajal RN/[facility]            COVID 19 Nurse Triage Plan/Patient Instructions    Please be aware that novel coronavirus (COVID-19) may be circulating in the community. If you develop symptoms such as fever, cough, or SOB or if you have concerns about the presence of another infection including coronavirus (COVID-19), please contact your health care provider or visit [email or web address].     Disposition/Instructions    ED Visit recommended. Follow protocol based instructions.     Bring Your Own Device:  Please also bring your smart device(s) (smart phones, " tablets, laptops) and their charging cables for your personal use and to communicate with your care team during your visit.    Thank you for taking steps to prevent the spread of this virus.  o Limit your contact with others.  o Wear a simple mask to cover your cough.  o Wash your hands well and often.    Resources    [facility]: About COVID-19: [email or web address]    CDC: What to Do If You're Sick: www.cdc.gov/coronavirus/2019-ncov/about/steps-when-sick.html    CDC: Ending Home Isolation: www.cdc.gov/coronavirus/2019-ncov/hcp/disposition-in-home-patients.html     CDC: Caring for Someone: www.cdc.gov/coronavirus/2019-ncov/if-you-are-sick/care-for-someone.html     [facility]: Interim Guidance for Hospital Discharge to Home: www.[email or web address]./diseases/coronavirus/hcp/hospdischarge.pdf    [facility] clinical trials (COVID-19 research studies): [email or web address].[facility]-clinical-trials     Below are the COVID-19 hotlines at the [place] [facility] ([facility]). Interpreters are available.   o For health questions: Call 256-641-6735 or 1-554.166.9790 (7 a.m. to 7 p.m.)  o For questions about schools and childcare: Call 231-225-6536 or 1-295.229.9559 (7 a.m. to 7 p.m.)     Reason for Disposition    [1] Systolic BP  >= 160 OR Diastolic >= 100 AND [2] cardiac or neurologic symptoms (e.g., chest pain, difficulty breathing, unsteady gait, blurred vision)    Additional Information    Negative: Difficult to awaken or acting confused (e.g., disoriented, slurred speech)    Negative: Severe difficulty breathing (e.g., struggling for each breath, speaks in single words)    Negative: [1] Weakness of the face, arm or leg on one side of the body AND [2] new onset    Negative: [1] Numbness (i.e., loss of sensation) of the face, arm or leg on one side of the body AND [2] new onset    Negative: [1] Chest pain lasts > 5 minutes AND [2] history of heart disease  (i.e., heart attack, bypass surgery,  angina, angioplasty, CHF)    Negative: [1] Chest pain AND [2] took nitrogylcerin AND [3] pain was not relieved    Negative: Sounds like a life-threatening emergency to the triager    Negative: Symptom is main concern  (e.g., headache, chest pain)    Negative: Low blood pressure is main concern    Protocols used: HIGH BLOOD PRESSURE-A-AH

## 2020-11-11 NOTE — PROGRESS NOTES
"Subjective     Parmjit Hernández is a 65 year old male who presents to clinic today for the following health issues:    HPI         Hypertension Follow-up      Do you check your blood pressure regularly outside of the clinic? Yes     Are you following a low salt diet? Yes    Are your blood pressures ever more than 140 on the top number (systolic) OR more   than 90 on the bottom number (diastolic), for example 140/90? Yes      How many servings of fruits and vegetables do you eat daily?  0-1    On average, how many sweetened beverages do you drink each day (Examples: soda, juice, sweet tea, etc.  Do NOT count diet or artificially sweetened beverages)?   0    How many days per week do you exercise enough to make your heart beat faster? 3 or less    How many minutes a day do you exercise enough to make your heart beat faster? 9 or less    How many days per week do you miss taking your medication? 0    Sudden onset blood pressure elevation last night. He felt \"off\" \"uncomfortable\" \"anxious\"  Reports that he blood pressure 187/109  201/119  207/111  198/110  Patient took Metoprolol and Alprazolam.  Patient states that he had a generalized anxiety episode and attributed his anxiety to work load. Feeling that his anxiety was high because of the increase in numbers of Covid. To the point that he went for a Covid test yesterday.      Patient takes lisinopril 10 mg, metoprolol 50 mg once daily  Furosemide 20mg    No chest pain, shortness of breath, headache, neck pain,     Also left hand felt numb after resting his elbow on the arm-chair.       Review of Systems   Constitutional, HEENT, cardiovascular, pulmonary, gi and gu systems are negative, except as otherwise noted.      Objective    BP (!) 159/95   Pulse 77   Temp 98.7  F (37.1  C) (Tympanic)   Resp 16   Ht 1.702 m (5' 7\")   Wt 123.8 kg (273 lb)   SpO2 96%   BMI 42.76 kg/m    Body mass index is 42.76 kg/m .  Physical Exam   GENERAL: healthy, alert and no " distress  RESP: lungs clear to auscultation - no rales, rhonchi or wheezes  CV: regular rate and rhythm, normal S1 S2, no S3 or S4, no murmur, click or rub, no peripheral edema and peripheral pulses strong    No results found. However, due to the size of the patient record, not all encounters were searched. Please check Results Review for a complete set of results.        Assessment & Plan     Hypertension goal BP (blood pressure) < 140/90  The patient has been having persistently elevated blood pressures over the past few days.  His lisinopril dose was increased from 10 mg to 15 mg once daily.  He does have a history of hypotension in the past therefore I increased slightly today.  He was advised to continue with remaining antihypertensive medications.        Return in about 1 week (around 11/18/2020) for Follow-up visit.    Vince Henry MD  [facility]

## 2020-11-11 NOTE — ED AVS SNAPSHOT
[facility] Emergency Dept  [address] [address]  Phone: [phone]  Fax: [phone]                                    Parmjit Hernández   MRN: [id number]    Department: [facility] Emergency Dept   Date of Visit: 11/11/2020           After Visit Summary Signature Page    I have received my discharge instructions, and my questions have been answered. I have discussed any challenges I see with this plan with the nurse or doctor.    ..........................................................................................................................................  Patient/Patient Representative Signature      ..........................................................................................................................................  Patient Representative Print Name and Relationship to Patient    ..................................................               ................................................  Date                                   Time    ..........................................................................................................................................  Reviewed by Signature/Title    ...................................................              ..............................................  Date                                               Time          22EPIC Rev 08/18

## 2020-11-11 NOTE — NURSING NOTE
"Chief Complaint   Patient presents with     Hypertension     Swelling     initial BP (!) 173/93 (BP Location: Left arm, Cuff Size: Adult Large)   Pulse 77   Temp 98.7  F (37.1  C) (Tympanic)   Resp 16   Ht 1.702 m (5' 7\")   Wt 123.8 kg (273 lb)   SpO2 96%   BMI 42.76 kg/m   Estimated body mass index is 42.76 kg/m  as calculated from the following:    Height as of this encounter: 1.702 m (5' 7\").    Weight as of this encounter: 123.8 kg (273 lb).  BP completed using cuff size: large.  L  arm      Health Maintenance that is potentially due pending provider review:  NONE    n/a    Sung Car ma  "

## 2020-11-12 ENCOUNTER — MYC MEDICAL ADVICE (OUTPATIENT)
Dept: FAMILY MEDICINE | Facility: CLINIC | Age: 65
End: 2020-11-12

## 2020-11-12 ENCOUNTER — TRANSFERRED RECORDS (OUTPATIENT)
Dept: HEALTH INFORMATION MANAGEMENT | Facility: CLINIC | Age: 65
End: 2020-11-12

## 2020-11-12 ENCOUNTER — APPOINTMENT (OUTPATIENT)
Dept: GENERAL RADIOLOGY | Facility: CLINIC | Age: 65
DRG: 281 | End: 2020-11-12
Attending: EMERGENCY MEDICINE
Payer: COMMERCIAL

## 2020-11-12 ENCOUNTER — HOSPITAL ENCOUNTER (INPATIENT)
Facility: CLINIC | Age: 65
LOS: 1 days | Discharge: HOME | DRG: 281 | End: 2020-11-13
Attending: EMERGENCY MEDICINE | Admitting: STUDENT IN AN ORGANIZED HEALTH CARE EDUCATION/TRAINING PROGRAM
Payer: COMMERCIAL

## 2020-11-12 DIAGNOSIS — I10 HYPERTENSION GOAL BP (BLOOD PRESSURE) < 140/90: ICD-10-CM

## 2020-11-12 DIAGNOSIS — I89.0 ACQUIRED LYMPHEDEMA: ICD-10-CM

## 2020-11-12 DIAGNOSIS — I21.4 NSTEMI (NON-ST ELEVATED MYOCARDIAL INFARCTION) (H): ICD-10-CM

## 2020-11-12 PROBLEM — N17.9 AKI (ACUTE KIDNEY INJURY) (H): Status: RESOLVED | Noted: 2020-04-18 | Resolved: 2020-11-12

## 2020-11-12 LAB
ALBUMIN SERPL-MCNC: 3.7 G/DL (ref 3.4–5)
ALBUMIN UR-MCNC: NEGATIVE MG/DL
ALP SERPL-CCNC: 93 U/L (ref 40–150)
ALT SERPL W P-5'-P-CCNC: 55 U/L (ref 0–70)
ANION GAP SERPL CALCULATED.3IONS-SCNC: 6 MMOL/L (ref 3–14)
APPEARANCE UR: CLEAR
AST SERPL W P-5'-P-CCNC: 29 U/L (ref 0–45)
BACTERIA #/AREA URNS HPF: ABNORMAL /HPF
BASOPHILS # BLD AUTO: 0 10E9/L (ref 0–0.2)
BASOPHILS NFR BLD AUTO: 0.4 %
BILIRUB SERPL-MCNC: 0.3 MG/DL (ref 0.2–1.3)
BILIRUB UR QL STRIP: NEGATIVE
BUN SERPL-MCNC: 22 MG/DL (ref 7–30)
CALCIUM SERPL-MCNC: 9 MG/DL (ref 8.5–10.1)
CHLORIDE SERPL-SCNC: 107 MMOL/L (ref 94–109)
CO2 SERPL-SCNC: 26 MMOL/L (ref 20–32)
COLOR UR AUTO: YELLOW
CREAT SERPL-MCNC: 1.08 MG/DL (ref 0.66–1.25)
DIFFERENTIAL METHOD BLD: ABNORMAL
EOSINOPHIL # BLD AUTO: 0.2 10E9/L (ref 0–0.7)
EOSINOPHIL NFR BLD AUTO: 3.9 %
ERYTHROCYTE [DISTWIDTH] IN BLOOD BY AUTOMATED COUNT: 13.8 % (ref 10–15)
GFR SERPL CREATININE-BSD FRML MDRD: 72 ML/MIN/{1.73_M2}
GLUCOSE SERPL-MCNC: 102 MG/DL (ref 70–99)
GLUCOSE UR STRIP-MCNC: NEGATIVE MG/DL
HCT VFR BLD AUTO: 37.6 % (ref 40–53)
HGB BLD-MCNC: 13 G/DL (ref 13.3–17.7)
HGB UR QL STRIP: NEGATIVE
IMM GRANULOCYTES # BLD: 0 10E9/L (ref 0–0.4)
IMM GRANULOCYTES NFR BLD: 0.2 %
INTERPRETATION ECG - MUSE: NORMAL
KETONES UR STRIP-MCNC: NEGATIVE MG/DL
LABORATORY COMMENT REPORT: NORMAL
LEUKOCYTE ESTERASE UR QL STRIP: NEGATIVE
LIPASE SERPL-CCNC: 202 U/L (ref 73–393)
LYMPHOCYTES # BLD AUTO: 0.9 10E9/L (ref 0.8–5.3)
LYMPHOCYTES NFR BLD AUTO: 15.6 %
MCH RBC QN AUTO: 32.3 PG (ref 26.5–33)
MCHC RBC AUTO-ENTMCNC: 34.6 G/DL (ref 31.5–36.5)
MCV RBC AUTO: 93 FL (ref 78–100)
MONOCYTES # BLD AUTO: 0.4 10E9/L (ref 0–1.3)
MONOCYTES NFR BLD AUTO: 6.9 %
NEUTROPHILS # BLD AUTO: 4.2 10E9/L (ref 1.6–8.3)
NEUTROPHILS NFR BLD AUTO: 73 %
NITRATE UR QL: NEGATIVE
NRBC # BLD AUTO: 0 10*3/UL
NRBC BLD AUTO-RTO: 0 /100
NT-PROBNP SERPL-MCNC: 96 PG/ML (ref 0–900)
PH UR STRIP: 5 PH (ref 5–7)
PLATELET # BLD AUTO: 135 10E9/L (ref 150–450)
POTASSIUM SERPL-SCNC: 4 MMOL/L (ref 3.4–5.3)
PROT SERPL-MCNC: 6.7 G/DL (ref 6.8–8.8)
RBC # BLD AUTO: 4.03 10E12/L (ref 4.4–5.9)
RBC #/AREA URNS AUTO: 1 /HPF (ref 0–2)
SARS-COV-2 RNA SPEC QL NAA+PROBE: NEGATIVE
SARS-COV-2 RNA SPEC QL NAA+PROBE: NORMAL
SODIUM SERPL-SCNC: 139 MMOL/L (ref 133–144)
SOURCE: ABNORMAL
SP GR UR STRIP: 1.01 (ref 1–1.03)
SPECIMEN SOURCE: NORMAL
SPECIMEN SOURCE: NORMAL
TROPONIN I SERPL-MCNC: 0.09 UG/L (ref 0–0.04)
TSH SERPL DL<=0.005 MIU/L-ACNC: 2.47 MU/L (ref 0.4–4)
UROBILINOGEN UR STRIP-MCNC: NORMAL MG/DL (ref 0–2)
WBC # BLD AUTO: 5.7 10E9/L (ref 4–11)
WBC #/AREA URNS AUTO: 1 /HPF (ref 0–5)

## 2020-11-12 PROCEDURE — 96376 TX/PRO/DX INJ SAME DRUG ADON: CPT

## 2020-11-12 PROCEDURE — 80053 COMPREHEN METABOLIC PANEL: CPT | Performed by: EMERGENCY MEDICINE

## 2020-11-12 PROCEDURE — 99285 EMERGENCY DEPT VISIT HI MDM: CPT | Mod: 25

## 2020-11-12 PROCEDURE — 83690 ASSAY OF LIPASE: CPT | Performed by: EMERGENCY MEDICINE

## 2020-11-12 PROCEDURE — 96375 TX/PRO/DX INJ NEW DRUG ADDON: CPT

## 2020-11-12 PROCEDURE — 84484 ASSAY OF TROPONIN QUANT: CPT | Performed by: EMERGENCY MEDICINE

## 2020-11-12 PROCEDURE — 250N000013 HC RX MED GY IP 250 OP 250 PS 637: Performed by: EMERGENCY MEDICINE

## 2020-11-12 PROCEDURE — 93005 ELECTROCARDIOGRAM TRACING: CPT | Mod: 76

## 2020-11-12 PROCEDURE — 84443 ASSAY THYROID STIM HORMONE: CPT | Performed by: EMERGENCY MEDICINE

## 2020-11-12 PROCEDURE — 71046 X-RAY EXAM CHEST 2 VIEWS: CPT

## 2020-11-12 PROCEDURE — 96365 THER/PROPH/DIAG IV INF INIT: CPT

## 2020-11-12 PROCEDURE — 93005 ELECTROCARDIOGRAM TRACING: CPT

## 2020-11-12 PROCEDURE — 83880 ASSAY OF NATRIURETIC PEPTIDE: CPT | Performed by: EMERGENCY MEDICINE

## 2020-11-12 PROCEDURE — 120N000001 HC R&B MED SURG/OB

## 2020-11-12 PROCEDURE — U0003 INFECTIOUS AGENT DETECTION BY NUCLEIC ACID (DNA OR RNA); SEVERE ACUTE RESPIRATORY SYNDROME CORONAVIRUS 2 (SARS-COV-2) (CORONAVIRUS DISEASE [COVID-19]), AMPLIFIED PROBE TECHNIQUE, MAKING USE OF HIGH THROUGHPUT TECHNOLOGIES AS DESCRIBED BY CMS-2020-01-R: HCPCS | Performed by: EMERGENCY MEDICINE

## 2020-11-12 PROCEDURE — C9803 HOPD COVID-19 SPEC COLLECT: HCPCS

## 2020-11-12 PROCEDURE — 85025 COMPLETE CBC W/AUTO DIFF WBC: CPT | Performed by: EMERGENCY MEDICINE

## 2020-11-12 PROCEDURE — 99223 1ST HOSP IP/OBS HIGH 75: CPT | Mod: AI | Performed by: STUDENT IN AN ORGANIZED HEALTH CARE EDUCATION/TRAINING PROGRAM

## 2020-11-12 PROCEDURE — 81001 URINALYSIS AUTO W/SCOPE: CPT | Performed by: EMERGENCY MEDICINE

## 2020-11-12 PROCEDURE — 250N000011 HC RX IP 250 OP 636: Performed by: EMERGENCY MEDICINE

## 2020-11-12 PROCEDURE — 96366 THER/PROPH/DIAG IV INF ADDON: CPT

## 2020-11-12 RX ORDER — HEPARIN SODIUM 10000 [USP'U]/100ML
0-5000 INJECTION, SOLUTION INTRAVENOUS CONTINUOUS
Status: DISCONTINUED | OUTPATIENT
Start: 2020-11-12 | End: 2020-11-13

## 2020-11-12 RX ORDER — ALPRAZOLAM 0.25 MG
0.5 TABLET ORAL ONCE
Status: COMPLETED | OUTPATIENT
Start: 2020-11-12 | End: 2020-11-12

## 2020-11-12 RX ORDER — ACETAMINOPHEN 500 MG
1000 TABLET ORAL ONCE
Status: COMPLETED | OUTPATIENT
Start: 2020-11-12 | End: 2020-11-12

## 2020-11-12 RX ORDER — DEXAMETHASONE SODIUM PHOSPHATE 10 MG/ML
10 INJECTION, SOLUTION INTRAMUSCULAR; INTRAVENOUS ONCE
Status: COMPLETED | OUTPATIENT
Start: 2020-11-12 | End: 2020-11-12

## 2020-11-12 RX ORDER — MULTIPLE VITAMINS W/ MINERALS TAB 9MG-400MCG
1 TAB ORAL DAILY
COMMUNITY
End: 2024-08-01

## 2020-11-12 RX ADMIN — ALPRAZOLAM 0.5 MG: 0.25 TABLET ORAL at 20:37

## 2020-11-12 RX ADMIN — DEXAMETHASONE SODIUM PHOSPHATE 10 MG: 10 INJECTION, SOLUTION INTRAMUSCULAR; INTRAVENOUS at 18:25

## 2020-11-12 RX ADMIN — HEPARIN SODIUM 1200 UNITS/HR: 10000 INJECTION, SOLUTION INTRAVENOUS at 21:17

## 2020-11-12 RX ADMIN — ACETAMINOPHEN 1000 MG: 500 TABLET, FILM COATED ORAL at 18:24

## 2020-11-12 ASSESSMENT — ENCOUNTER SYMPTOMS
SHORTNESS OF BREATH: 0
FATIGUE: 1
WEAKNESS: 1
FEVER: 0

## 2020-11-12 ASSESSMENT — MIFFLIN-ST. JEOR: SCORE: 1927.98

## 2020-11-12 NOTE — ED PROVIDER NOTES
History   Chief Complaint  Hypertension    The history is provided by the patient.      Parmjit Hernández is a 65 year old male with a history of hyperlipidemia, hypertension, NSTEMI, and type 2 diabetes who presents via EMS for evaluation of transient spikes in his blood pressure, particularly at night. The patient states that a few months ago he was self managing his high BP and taking lisinopril 30 mg, although he had a near syncopal episode with blood pressure down to 70/34 (April 2020). He was subsequently taken off lisinopril and his blood pressure started slowly increasing again. His PCP, Dr. Henry, placed him on lisinopril 10 mg in the morning and then added metoprolol 50 mg in the evening. He was seen here about two weeks ago where he had an elevated blood pressure of 174/105. However, since then his blood pressures have remained relatively stable. Yesterday evening he had an elevated systolic BP in the 200s, but did not seek medical attention. He saw his PCP again today who increased his lisinopril to 15 mg and encouraged him to be seen in the ED when his BP is over 180/110. He noticed his blood pressure rising again tonight as he developed some chest pains around 4 PM. He then took his Metoprolol at 530 PM today, instead of waiting until 10 PM as usual. He checked his BP between 630 to 7 PM and it was 205/111 prompting him to call EMS. Although upon their arrival his BP had already started to decrease to a systolic of 130-140s. He states that his blood pressure is always normal in the morning (with systolic in 120s), including today (125/85), and would like to know why his BP elevates transiently at night. He does admit that anxiety surrounding today's current events could be contributing. Notably, he had a cardiac MRI in August 2020 that was normal.     Allergies:  Cephalexin  Liraglutide  Sulfa Drugs  Victoza     Medications:    Aspirin 81  mg  Lipitor  Vraylar  Depakot  Lasix  Amaryl  Zestril  Lithobid  Glucophage  Toprol  Provigil  Lovaza  Lyrica  Januvia  Senokot  Flomax     Past Medical History:    Lymphedema  Amyloidosis  Anxiety  Bipolar 1 disorder  Diverticulitis  Hyperlipidemia  Hypertension  Marianne  Obesity  Narcotic dependence  NSTEMI  OCD  Cirrhosis of liver  Renal failure  Sleep apnea  Type 2 DM  KWABENA  DDD  Peripheral neuropathy  Migraines      Past Surgical History:    BMT protocol  Bone marrow biopsy x2  Cholecystectomy  Colonoscopy  EGD  Hernia repair  Repair deviated septum      Family History:    CVD  CAD  Hypertension  Alcohol/drug abuse  Arthritis      Social History:  Marital Status:   Tobacco use: Never  Alcohol use: No  Drug use: No    Review of Systems   Cardiovascular: Positive for chest pain.   All other systems reviewed and are negative.      Physical Exam     Patient Vitals for the past 24 hrs:   BP Temp Temp src Pulse Resp SpO2 Weight   11/11/20 2200 -- -- -- -- 16 95 % --   11/11/20 2130 -- -- -- 79 -- 95 % --   11/11/20 2100 119/76 -- -- 77 -- 95 % --   11/11/20 2053 -- 97.7  F (36.5  C) Oral -- -- -- --   11/11/20 2030 (!) 152/98 -- -- 81 16 94 % 123.8 kg (273 lb)       Physical Exam    Constitutional: Alert, mildly anxious  HENT:    Nose: Nose normal.    Mouth/Throat: Oropharynx is clear, mucous membranes are moist  Eyes: EOM are normal. Pupils are equal, round, and reactive to light.   CV: Regular rate and rhythm, no murmurs, rubs or gallops.  Resp: Clear lungs to auscultation, all lung fields. Normal respiratory effort.   GI: Soft, non-distended. There is no tenderness. No rebound or guarding.   MSK: Normal range of motion. No deformity.   Neurological:   A/Ox3  5/5 strength is symmetric to the upper and lower extremities;   Sensation intact to light touch throughout the upper and lower extremities;   Skin: Skin is warm and dry.    Emergency Department Course   ECG:  ECG taken at 2110, ECG read at 2122  Normal  sinus rhythm  Left axis deviation  Incomplete right bundle branch block  Inferior infarct, age undetermined  Anterolateral infarct, age undetermined  Abnormal EGC  No ST elevation or depression   No T wave inversion   No significant change when compared to EKG dated 10/19/2020.   Rate 75 bpm. WY interval 184 ms. QRS duration 116 ms. QT/QTc 398/444 ms. P-R-T axes 34 -47 -8.     Laboratory:  Laboratory findings were communicated with the patient who voiced understanding of the findings.    CBC: HGB 12.9 (L),  (L) o/w WNL (WBC 5.9)  CMP: protein total 6.6 (L) o/w WNL (Creatinine 1.05)  Troponin (Collected 2056): 0.049 (H)    Emergency Department Course:  Past medical records, nursing notes, and vitals reviewed.    2030 I physically examined the patient as documented above.    EKG obtained in the ED, see results above.     IV was inserted and blood was drawn for laboratory testing, results above.    2139 I rechecked the patient and discussed the findings of their workup thus far. I offered observation admission for his elevated troponin and he declined as he was told at his last admission that he has chronically elevated troponin.      Findings and plan explained to the Patient. Patient discharged home with instructions regarding supportive care, medications, and reasons to return. The importance of close follow-up was reviewed.    I personally reviewed the laboratory and imaging results with the Patient and answered all related questions prior to discharge.     Impression & Plan   Medical Decision Making:  This is a 65 year old male who comes in with chest pain and high blood pressure at home. Patient with intermittently high blood pressures over the last few days. Exam as above. Troponin 0.049. EKG non-ischemic and is unchanged from previous. White count normal. Hemoglobin at baseline. Electrolytes grossly normal. Discussed admission/observation for trending troponins and patient declined stating that he would  rather go home as his cat was shut in the wrong room by EMS when he was picked up due to his high blood pressure. Patient's pressures have been normal here and there have been no documented MAP's over 140. Patient expressed understanding of need for close follow-up with primary care doctor.    Diagnosis:    ICD-10-CM    1. Chest pain, unspecified type  R07.9    2. Essential hypertension  I10      Disposition:  Discharged to home.      Scribe Disclosure:  I, Jazlyn Herron, am serving as a scribe at 8:25 PM on 11/11/2020 to document services personally performed by Sherwin Rivera DO based on my observations and the provider's statements to me.      Sherwin Rivera DO  11/12/20 0100

## 2020-11-12 NOTE — ED NOTES
Bed: ED30  Expected date: 11/11/20  Expected time: 8:08 PM  Means of arrival:   Comments:  736 to 30 when clean 65m CP HTN

## 2020-11-12 NOTE — ED TRIAGE NOTES
Pt took his BP today and found it to be 200s/100s. For EMS he started hypertensive in the 180s systolic and went down to 140s systolic. Pt anxious. Pt reported intermittent CP for EMS and slight headache

## 2020-11-12 NOTE — ED NOTES
Bed: ED25  Expected date:   Expected time:   Means of arrival:   Comments:  730-65M Chest Pain/hypertension

## 2020-11-13 ENCOUNTER — APPOINTMENT (OUTPATIENT)
Dept: CARDIOLOGY | Facility: CLINIC | Age: 65
DRG: 281 | End: 2020-11-13
Attending: STUDENT IN AN ORGANIZED HEALTH CARE EDUCATION/TRAINING PROGRAM
Payer: COMMERCIAL

## 2020-11-13 ENCOUNTER — TELEPHONE (OUTPATIENT)
Dept: FAMILY MEDICINE | Facility: CLINIC | Age: 65
End: 2020-11-13

## 2020-11-13 VITALS
BODY MASS INDEX: 41.69 KG/M2 | SYSTOLIC BLOOD PRESSURE: 129 MMHG | TEMPERATURE: 97.4 F | WEIGHT: 265.6 LBS | RESPIRATION RATE: 16 BRPM | DIASTOLIC BLOOD PRESSURE: 76 MMHG | OXYGEN SATURATION: 99 % | HEIGHT: 67 IN | HEART RATE: 62 BPM

## 2020-11-13 LAB
ANION GAP SERPL CALCULATED.3IONS-SCNC: 7 MMOL/L (ref 3–14)
APTT PPP: 36 SEC (ref 22–37)
BUN SERPL-MCNC: 25 MG/DL (ref 7–30)
CALCIUM SERPL-MCNC: 8.7 MG/DL (ref 8.5–10.1)
CHLORIDE SERPL-SCNC: 106 MMOL/L (ref 94–109)
CO2 SERPL-SCNC: 23 MMOL/L (ref 20–32)
CREAT SERPL-MCNC: 1.05 MG/DL (ref 0.66–1.25)
ERYTHROCYTE [DISTWIDTH] IN BLOOD BY AUTOMATED COUNT: 13.9 % (ref 10–15)
GFR SERPL CREATININE-BSD FRML MDRD: 74 ML/MIN/{1.73_M2}
GLUCOSE SERPL-MCNC: 145 MG/DL (ref 70–99)
HCT VFR BLD AUTO: 37.1 % (ref 40–53)
HGB BLD-MCNC: 12.8 G/DL (ref 13.3–17.7)
MCH RBC QN AUTO: 32.2 PG (ref 26.5–33)
MCHC RBC AUTO-ENTMCNC: 34.5 G/DL (ref 31.5–36.5)
MCV RBC AUTO: 94 FL (ref 78–100)
PLATELET # BLD AUTO: 128 10E9/L (ref 150–450)
POTASSIUM SERPL-SCNC: 4.4 MMOL/L (ref 3.4–5.3)
RBC # BLD AUTO: 3.97 10E12/L (ref 4.4–5.9)
SODIUM SERPL-SCNC: 136 MMOL/L (ref 133–144)
TROPONIN I SERPL-MCNC: 0.03 UG/L (ref 0–0.04)
UFH PPP CHRO-ACNC: 0.46 IU/ML
UFH PPP CHRO-ACNC: <0.1 IU/ML
WBC # BLD AUTO: 6 10E9/L (ref 4–11)

## 2020-11-13 PROCEDURE — 250N000013 HC RX MED GY IP 250 OP 250 PS 637: Performed by: INTERNAL MEDICINE

## 2020-11-13 PROCEDURE — 99239 HOSP IP/OBS DSCHRG MGMT >30: CPT | Performed by: INTERNAL MEDICINE

## 2020-11-13 PROCEDURE — 80048 BASIC METABOLIC PNL TOTAL CA: CPT | Performed by: STUDENT IN AN ORGANIZED HEALTH CARE EDUCATION/TRAINING PROGRAM

## 2020-11-13 PROCEDURE — 255N000002 HC RX 255 OP 636: Performed by: STUDENT IN AN ORGANIZED HEALTH CARE EDUCATION/TRAINING PROGRAM

## 2020-11-13 PROCEDURE — 120N000001 HC R&B MED SURG/OB

## 2020-11-13 PROCEDURE — 85520 HEPARIN ASSAY: CPT | Performed by: STUDENT IN AN ORGANIZED HEALTH CARE EDUCATION/TRAINING PROGRAM

## 2020-11-13 PROCEDURE — 85730 THROMBOPLASTIN TIME PARTIAL: CPT | Performed by: EMERGENCY MEDICINE

## 2020-11-13 PROCEDURE — 250N000013 HC RX MED GY IP 250 OP 250 PS 637: Performed by: STUDENT IN AN ORGANIZED HEALTH CARE EDUCATION/TRAINING PROGRAM

## 2020-11-13 PROCEDURE — 85520 HEPARIN ASSAY: CPT | Performed by: EMERGENCY MEDICINE

## 2020-11-13 PROCEDURE — 99222 1ST HOSP IP/OBS MODERATE 55: CPT | Mod: 25 | Performed by: INTERNAL MEDICINE

## 2020-11-13 PROCEDURE — 36415 COLL VENOUS BLD VENIPUNCTURE: CPT | Performed by: EMERGENCY MEDICINE

## 2020-11-13 PROCEDURE — 36415 COLL VENOUS BLD VENIPUNCTURE: CPT | Performed by: STUDENT IN AN ORGANIZED HEALTH CARE EDUCATION/TRAINING PROGRAM

## 2020-11-13 PROCEDURE — 93306 TTE W/DOPPLER COMPLETE: CPT | Mod: 26 | Performed by: INTERNAL MEDICINE

## 2020-11-13 PROCEDURE — 250N000011 HC RX IP 250 OP 636: Performed by: EMERGENCY MEDICINE

## 2020-11-13 PROCEDURE — 999N000208 ECHOCARDIOGRAM COMPLETE

## 2020-11-13 PROCEDURE — 84484 ASSAY OF TROPONIN QUANT: CPT | Performed by: STUDENT IN AN ORGANIZED HEALTH CARE EDUCATION/TRAINING PROGRAM

## 2020-11-13 PROCEDURE — 85027 COMPLETE CBC AUTOMATED: CPT | Performed by: STUDENT IN AN ORGANIZED HEALTH CARE EDUCATION/TRAINING PROGRAM

## 2020-11-13 RX ORDER — ACETAMINOPHEN 500 MG
1000 TABLET ORAL EVERY 8 HOURS PRN
Status: DISCONTINUED | OUTPATIENT
Start: 2020-11-13 | End: 2020-11-13 | Stop reason: HOSPADM

## 2020-11-13 RX ORDER — ACETAMINOPHEN 325 MG/1
650 TABLET ORAL EVERY 4 HOURS PRN
Status: DISCONTINUED | OUTPATIENT
Start: 2020-11-13 | End: 2020-11-13

## 2020-11-13 RX ORDER — FUROSEMIDE 20 MG
20 TABLET ORAL DAILY
Qty: 30 TABLET | Refills: 0 | COMMUNITY
Start: 2020-11-13 | End: 2020-11-19

## 2020-11-13 RX ORDER — ONDANSETRON 4 MG/1
4 TABLET, ORALLY DISINTEGRATING ORAL EVERY 6 HOURS PRN
Status: DISCONTINUED | OUTPATIENT
Start: 2020-11-13 | End: 2020-11-13 | Stop reason: HOSPADM

## 2020-11-13 RX ORDER — LISINOPRIL 20 MG/1
20 TABLET ORAL DAILY
Qty: 30 TABLET | Refills: 1 | Status: ON HOLD | OUTPATIENT
Start: 2020-11-14 | End: 2020-12-04

## 2020-11-13 RX ORDER — ASPIRIN 81 MG/1
81 TABLET, CHEWABLE ORAL DAILY
Status: DISCONTINUED | OUTPATIENT
Start: 2020-11-13 | End: 2020-11-13 | Stop reason: HOSPADM

## 2020-11-13 RX ORDER — NITROGLYCERIN 0.4 MG/1
0.4 TABLET SUBLINGUAL EVERY 5 MIN PRN
Status: DISCONTINUED | OUTPATIENT
Start: 2020-11-13 | End: 2020-11-13 | Stop reason: HOSPADM

## 2020-11-13 RX ORDER — PREGABALIN 100 MG/1
100 CAPSULE ORAL 4 TIMES DAILY
Status: DISCONTINUED | OUTPATIENT
Start: 2020-11-13 | End: 2020-11-13 | Stop reason: HOSPADM

## 2020-11-13 RX ORDER — ALPRAZOLAM 0.5 MG
0.5 TABLET ORAL AT BEDTIME
Status: DISCONTINUED | OUTPATIENT
Start: 2020-11-13 | End: 2020-11-13 | Stop reason: HOSPADM

## 2020-11-13 RX ORDER — LIDOCAINE 40 MG/G
CREAM TOPICAL
Status: DISCONTINUED | OUTPATIENT
Start: 2020-11-13 | End: 2020-11-13 | Stop reason: HOSPADM

## 2020-11-13 RX ORDER — METOPROLOL SUCCINATE 50 MG/1
50 TABLET, EXTENDED RELEASE ORAL DAILY
Status: DISCONTINUED | OUTPATIENT
Start: 2020-11-13 | End: 2020-11-13 | Stop reason: HOSPADM

## 2020-11-13 RX ORDER — TAMSULOSIN HYDROCHLORIDE 0.4 MG/1
0.4 CAPSULE ORAL DAILY
Status: DISCONTINUED | OUTPATIENT
Start: 2020-11-13 | End: 2020-11-13 | Stop reason: HOSPADM

## 2020-11-13 RX ORDER — POLYETHYLENE GLYCOL 3350 17 G/17G
17 POWDER, FOR SOLUTION ORAL DAILY
Status: DISCONTINUED | OUTPATIENT
Start: 2020-11-13 | End: 2020-11-13 | Stop reason: HOSPADM

## 2020-11-13 RX ORDER — AMOXICILLIN 250 MG
2 CAPSULE ORAL 2 TIMES DAILY
Status: DISCONTINUED | OUTPATIENT
Start: 2020-11-13 | End: 2020-11-13 | Stop reason: HOSPADM

## 2020-11-13 RX ORDER — AMOXICILLIN 250 MG
1 CAPSULE ORAL 2 TIMES DAILY
Status: DISCONTINUED | OUTPATIENT
Start: 2020-11-13 | End: 2020-11-13 | Stop reason: HOSPADM

## 2020-11-13 RX ORDER — ATORVASTATIN CALCIUM 20 MG/1
20 TABLET, FILM COATED ORAL DAILY
Status: DISCONTINUED | OUTPATIENT
Start: 2020-11-13 | End: 2020-11-13 | Stop reason: HOSPADM

## 2020-11-13 RX ORDER — ONDANSETRON 2 MG/ML
4 INJECTION INTRAMUSCULAR; INTRAVENOUS EVERY 6 HOURS PRN
Status: DISCONTINUED | OUTPATIENT
Start: 2020-11-13 | End: 2020-11-13 | Stop reason: HOSPADM

## 2020-11-13 RX ORDER — TRIAMTERENE/HYDROCHLOROTHIAZID 37.5-25 MG
1 TABLET ORAL DAILY
Status: DISCONTINUED | OUTPATIENT
Start: 2020-11-13 | End: 2020-11-13 | Stop reason: HOSPADM

## 2020-11-13 RX ORDER — LISINOPRIL 20 MG/1
20 TABLET ORAL DAILY
Status: DISCONTINUED | OUTPATIENT
Start: 2020-11-14 | End: 2020-11-13 | Stop reason: HOSPADM

## 2020-11-13 RX ORDER — TRIAMTERENE/HYDROCHLOROTHIAZID 37.5-25 MG
1 TABLET ORAL DAILY
Qty: 30 TABLET | Refills: 0 | Status: ON HOLD | OUTPATIENT
Start: 2020-11-14 | End: 2020-12-04

## 2020-11-13 RX ADMIN — HEPARIN SODIUM 1500 UNITS/HR: 10000 INJECTION, SOLUTION INTRAVENOUS at 07:53

## 2020-11-13 RX ADMIN — ALPRAZOLAM 0.5 MG: 0.5 TABLET ORAL at 02:38

## 2020-11-13 RX ADMIN — ACETAMINOPHEN 1000 MG: 500 TABLET, FILM COATED ORAL at 15:56

## 2020-11-13 RX ADMIN — PREGABALIN 100 MG: 100 CAPSULE ORAL at 13:18

## 2020-11-13 RX ADMIN — TRIAMTERENE AND HYDROCHLOROTHIAZIDE 1 TABLET: 37.5; 25 TABLET ORAL at 11:59

## 2020-11-13 RX ADMIN — PREGABALIN 100 MG: 100 CAPSULE ORAL at 18:18

## 2020-11-13 RX ADMIN — HEPARIN SODIUM 1500 UNITS/HR: 10000 INJECTION, SOLUTION INTRAVENOUS at 03:43

## 2020-11-13 RX ADMIN — METOPROLOL SUCCINATE 50 MG: 50 TABLET, EXTENDED RELEASE ORAL at 09:07

## 2020-11-13 RX ADMIN — CARIPRAZINE 3 MG: 1.5 CAPSULE, GELATIN COATED ORAL at 11:59

## 2020-11-13 RX ADMIN — HUMAN ALBUMIN MICROSPHERES AND PERFLUTREN 9 ML: 10; .22 INJECTION, SOLUTION INTRAVENOUS at 15:43

## 2020-11-13 RX ADMIN — PREGABALIN 100 MG: 100 CAPSULE ORAL at 09:04

## 2020-11-13 RX ADMIN — DOCUSATE SODIUM 50 MG AND SENNOSIDES 8.6 MG 1 TABLET: 8.6; 5 TABLET, FILM COATED ORAL at 09:07

## 2020-11-13 RX ADMIN — DIVALPROEX SODIUM 750 MG: 250 TABLET, DELAYED RELEASE ORAL at 03:08

## 2020-11-13 RX ADMIN — ACETAMINOPHEN 650 MG: 325 TABLET, FILM COATED ORAL at 06:05

## 2020-11-13 RX ADMIN — LISINOPRIL 15 MG: 10 TABLET ORAL at 09:07

## 2020-11-13 RX ADMIN — TAMSULOSIN HYDROCHLORIDE 0.4 MG: 0.4 CAPSULE ORAL at 14:52

## 2020-11-13 RX ADMIN — ASPIRIN 81 MG: 81 TABLET, CHEWABLE ORAL at 09:07

## 2020-11-13 RX ADMIN — ATORVASTATIN CALCIUM 20 MG: 20 TABLET, FILM COATED ORAL at 09:07

## 2020-11-13 ASSESSMENT — ACTIVITIES OF DAILY LIVING (ADL)
ADLS_ACUITY_SCORE: 12
ADLS_ACUITY_SCORE: 12
ADLS_ACUITY_SCORE: 13
ADLS_ACUITY_SCORE: 12
ADLS_ACUITY_SCORE: 12

## 2020-11-13 ASSESSMENT — MIFFLIN-ST. JEOR: SCORE: 1953.38

## 2020-11-13 NOTE — H&P
Him back with the event monitor at the [facility]    History and Physical - Hospitalist Service       Date of Admission:  11/12/2020    Assessment & Plan     Parmjit Hernández is a is a 65 year old male with multiple chronic medical problems including type 2 diabetes mellitus with peripheral neuropathy, morbid obesity, restless legs syndrome, bipolar type I, depression, anxiety, hypertension, dyslipidemia, h/o NSTEMI, stasis dermatitis and lymphedema bilateral legs, chronic back pain with implanted pump and continuous opioid dependence, chronic abdominal pain, obstructive sleep apnea, chronic fatigue syndrome, BPH, history of migraines who presents with CP.     CAD  NSTEMI  Hypertension  Dyslipidemia  Assessment: EKG on admission shows Normal sinus rhythm with right bundle branch block and Left anterior fascicular block. Trop 0.049 -> 0.087 over the last day. Managed PTA with aspirin, atorvastatin, lisinopril, lasix.   Plan:  - admit to inpatient  - Heparin gtt  - ECHO in AM  - Cardiology eval  - Telemetry  - PTA medications resumed     Peripheral neuropathy  Polyneuropathy  Stasis dermatitis and lymphedema bilateral legs  Physical deconditioning  Morbid Obesity  Restless Legs Syndrome  Patient reports he can't walk but he has no focal deficits, no demonstrated weakness on exam. Multiple visits to doctors over the past several weeks for similar complaints.  Suspect multifactorial etiology including the peripheral neuropathy associated with diabetes his lymphedema, morbid obesity and generalized deconditioning.  - PTA lyrica continued     Anxiety  Depression  Bipolar Disorder  Assessment: PTA managed with cariprazine, depakote, modafinil, caffeine He did previously take lithium , but now stopped  - PTA medications resumed     Diabetes, type 2 with peripheral neuropathy  Managed PTA with metformin, glimepiride, sitagliptin. Recent hemoglobin A1c 5.5% from June 2020 indicating reasonable glycemic  control.   Plan:  - sliding scale insulin as needed  - Hold PTA regimen  - hypoglycemia protocol       Diet:   NPO @ midnight  DVT Prophylaxis: Heparin  Parker Catheter: not present  Code Status:   FULL CODE         Disposition Plan   Expected discharge: 2 - 3 days, recommended to prior living arrangement once Cardiac work up completed.  Entered: Jaren Martinez MD 11/12/2020, 11:10 PM     The patient's care was discussed with the Patient and ED Provider.    Jaren Martinez MD  [facility]  Contact information available via [facility] Paging/Directory      ______________________________________________________________________    Chief Complaint     Chest Pain    History is obtained from the patient    History of Present Illness     Parmjit hilliard a is a 65 year old male with multiple chronic medical problems including type 2 diabetes mellitus with peripheral neuropathy, morbid obesity, restless legs syndrome, bipolar type I, depression, anxiety, hypertension, dyslipidemia, h/o NSTEMI, stasis dermatitis and lymphedema bilateral legs, chronic back pain with implanted pump and continuous opioid dependence, chronic abdominal pain, obstructive sleep apnea, chronic fatigue syndrome, BPH, history of migraines who presents with CP.     Patient reports that he has been experiencing chest pain for the past day, he was evaluated today prior to admission and was found to have a troponin of 0.049 on a nonischemic EKG.  He was recommended to be admitted to observation, but he was declined and ultimately discharged.  On the morning of admission, he noted that his blood pressures were markedly high with systolic up to the 180s, despite taking his lisinopril.  Throughout the day he did not experience intermittent midsternal chest pain with radiation into his left arm.  He also notes that he has been experiencing increasing generalized weakness and fatigue above his baseline.  He initially attributed his symptoms to his  Guillain-Barré syndrome, that was diagnosed 3 weeks ago.  She denies any chest pain, no cough, no shortness of breath, no fevers or chills.  He endorses that his daughter was exposed to a COVID-19 contact, though she has not had a positive test.  He has been quarantining himself.  He denies any nausea/vomiting or abdominal pain, no calf pain, no worsening leg swelling than his baseline.  In the ED, he continued to have intermittent episodes of chest pain, but at this time is chest pain-free.  He denies any recent blood in his stool, no weight loss or night sweats.  He denies any headaches, vision changes, blurry vision, localized weakness or numbness of extremities otherwise.  At this time he has no other complaints.      Review of Systems    The 10 point Review of Systems is negative other than noted in the HPI or here.     Past Medical History    I have reviewed this patient's medical history and updated it with pertinent information if needed.   Past Medical History:   Diagnosis Date     Acquired lymphedema 2/4/2019     Allergic state      Amyloidosis (H)     followed by hematology Dr. Romeo status post peripheral stem cell transplant     Anxiety 5/11/2016     Anxiety and depression 12/18/2013     Bipolar I disorder (H) 9/1/2015    Under care of psychiatrist [facility]- Dr Rahman doxepin 25 mg, 2 cap bedtime DULoxetine 20 mg once daily  OLANZapine 7.5 mg  1 tab bedtime      Diabetes mellitus (H)     type 2     EKG abnormality 4/20/2020     Guillain Barré syndrome (H)      H/O bone marrow transplant (H)      Headache(784.0)      History of diverticulitis 1/27/2015    1/15-rxed with antibiotics      Hyperlipidemia LDL goal <100 10/31/2010     Hypertension 2007     Hypertension goal BP (blood pressure) < 140/90 10/11/2011     Marianne (H) 8/20/2015     Morbid obesity (H) 8/28/2018     Myalgia and myositis, unspecified      Narcotic dependence (H) 9/6/2016    None in about 6 months- 8/1/17     NSTEMI (non-ST elevated  myocardial infarction) (H) 04/19/2020     Obsessive-compulsive disorders      Obstructive sleep apnea 7/16/2008     OCD (obsessive compulsive disorder)      Other acquired absence of organ 94     Other cirrhosis of liver (H) possibly from vences  4/15/2020     Other specified viral warts      Pain in the abdomen      Peripheral edema 5/20/2018    Noncardiac Continue with  furosemide (LASIX) 20 MG tablet,  potassium       chloride SA (K-DUR/KLOR-CON M) 10 MEQ CR  Tab once daily  Basic metabolic panel     Polyneuropathy associated with underlying disease (H) 2/4/2019     Pure hypercholesterolemia      Renal failure 5/10/2019     Restless legs syndrome (RLS) 6/29/2017     Sleep apnea      Stasis dermatitis of both legs 12/31/2018     Type 2 diabetes mellitus with other specified complication (H) 7/10/2017       Past Surgical History   I have reviewed this patient's surgical history and updated it with pertinent information if needed.  Past Surgical History:   Procedure Laterality Date     BMT PROTOCOL      for systemic amyloidosis     BONE MARROW BIOPSY, BONE SPECIMEN, NEEDLE/TROCAR N/A 6/8/2016    Procedure: BIOPSY BONE MARROW;  Surgeon: Nathan Agrawal MD;  Location:  GI     BONE MARROW BIOPSY, BONE SPECIMEN, NEEDLE/TROCAR N/A 2/20/2019    Procedure: BIOPSY BONE MARROW;  Surgeon: Michael Raygoza MD;  Location:  GI     CHOLECYSTECTOMY  1995    lap qian     COLONOSCOPY  2012    hx polyps     ESOPHAGOSCOPY, GASTROSCOPY, DUODENOSCOPY (EGD), COMBINED N/A 5/29/2015    Procedure: COMBINED ESOPHAGOSCOPY, GASTROSCOPY, DUODENOSCOPY (EGD), BIOPSY SINGLE OR MULTIPLE;  Surgeon: John Jacob MD;  Location:  GI     HERNIA REPAIR, UMBILICAL  2006     [facility] NONSPECIFIC PROCEDURE  94    Cholecystectomy     [facility] NONSPECIFIC PROCEDURE  2000    repair deviated septum       Social History   I have reviewed this patient's social history and updated it with pertinent information if needed.  Social History      Tobacco Use     Smoking status: Never Smoker     Smokeless tobacco: Never Used   Substance Use Topics     Alcohol use: No     Alcohol/week: 0.0 standard drinks     Drug use: No       Family History   I have reviewed this patient's family history and updated it with pertinent information if needed.  Family History   Problem Relation Age of Onset     Cerebrovascular Disease Mother      C.A.D. Mother      Hypertension Mother      Alcohol/Drug Mother      Arthritis Mother      Cerebrovascular Disease Maternal Grandmother      C.A.D. Maternal Grandmother      Alcohol/Drug Maternal Grandmother      C.A.D. Father      Heart Disease Father      Hypertension Father      Alcohol/Drug Father      Allergies Father      Circulatory Father      Depression Father      Respiratory Father      Asthma Father      Alcohol/Drug Paternal Grandfather      Cerebrovascular Disease Paternal Grandfather      Depression Son      Psychotic Disorder Son         anxiety disorder     Depression Daughter      Cerebrovascular Disease Maternal Grandfather      Cerebrovascular Disease Paternal Grandmother      Diabetes Brother      Allergies Sister      Depression Sister      Gynecology Sister        Prior to Admission Medications   Prior to Admission Medications   Prescriptions Last Dose Informant Patient Reported? Taking?   ALPRAZolam (XANAX) 0.5 MG tablet 11/11/2020 at pm Self Yes Yes   Sig: Take 0.5 mg by mouth At Bedtime   NONFORMULARY  at due Dec 1 Self Yes Yes   Sig: by Intrathecal route continuous - + up to 4 boluses daily (100mcg each bolus usually once or 2x per day)    Managed by [facility]     Medications in Pump:  fentanyl 2000mcg/mL  Bupivacaine 20mg/mL  Morphine 5.8mg/mL     Rate:   Fentanyl 1200mcg/day  Bupivacaine 12mg/day  Morphine 4.2mg/day  Pump Last Fill Date:  09/2020   SUMAtriptan (IMITREX) 50 MG tablet  at prn Self No Yes   Sig: Take 1 tablet (50 mg) by mouth at onset of headache for migraine May  repeat in 2 hours. Max 4 tablets/24 hours.   Turmeric 500 MG CAPS 11/12/2020 at am Self Yes Yes   Sig: Take 1,200 mg by mouth daily    acetaminophen (TYLENOL) 325 MG tablet  at prn Self No Yes   Sig: Take 2 tablets (650 mg) by mouth every 4 hours as needed for mild pain   alpha-lipoic acid 100 MG capsule 11/12/2020 at am Self Yes Yes   Sig: Take 200 mg by mouth daily   aspirin (ASPIRIN LOW DOSE) 81 MG tablet 11/12/2020 at am Self No Yes   Sig: Take 1 tablet (81 mg) by mouth daily   atorvastatin (LIPITOR) 20 MG tablet 11/12/2020 at am Self No Yes   Sig: Take 1 tablet (20 mg) by mouth daily   caffeine (NO-DOZE) 200 MG TABS tablet 11/12/2020 at am Self Yes Yes   Sig: Take 400 mg by mouth daily   cariprazine (VRAYLAR) 3 MG CAPS capsule 11/12/2020 at am Self Yes Yes   Sig: Take 3 mg by mouth daily    divalproex sodium delayed-release (DEPAKOTE) 500 MG DR tablet 11/11/2020 at pm Self Yes Yes   Sig: Take 750 mg by mouth At Bedtime    furosemide (LASIX) 20 MG tablet 11/12/2020 at am Self No Yes   Sig: Take 1 tablet (20 mg) by mouth daily   glimepiride (AMARYL) 1 MG tablet 11/12/2020 at am Self No Yes   Sig: Take 1 tablet (1 mg) by mouth every morning (before breakfast)   lisinopril (ZESTRIL) 10 MG tablet 11/12/2020 at am  No Yes   Sig: Take 1.5 tablets (15 mg) by mouth daily   metFORMIN (GLUCOPHAGE-XR) 500 MG 24 hr tablet 11/12/2020 at am Self No Yes   Sig: Take 1000mg in AM and 1000mg in PM   metoprolol succinate ER (TOPROL-XL) 50 MG 24 hr tablet 11/11/2020 at pm Self No Yes   Sig: TAKE 1 TABLET(50 MG) BY MOUTH DAILY   modafinil (PROVIGIL) 200 MG tablet 11/12/2020 at am Self Yes Yes   Sig: Take 300 mg by mouth daily    multivitamin w/minerals (THERA-VIT-M) tablet 11/12/2020 at am  Yes Yes   Sig: Take 1 tablet by mouth daily   omega-3 acid ethyl esters (LOVAZA) 1 g capsule 11/12/2020 at am Self No Yes   Sig: Take 1 capsule (1 g) by mouth daily   pregabalin (LYRICA) 100 MG capsule 11/12/2020 at x1  No Yes   Sig: Take 1  capsule (100 mg) by mouth 4 times daily   senna-docusate (SENOKOT-S/PERICOLACE) 8.6-50 MG tablet 11/12/2020 at am Self Yes Yes   Sig: Take 1 tablet by mouth daily Take 1 tablet in the morning and 2 tablets in the evening.    sitagliptin (JANUVIA) 100 MG tablet 11/12/2020 at am  No Yes   Sig: Take 1 tablet (100 mg) by mouth daily   tamsulosin (FLOMAX) 0.4 MG capsule 11/12/2020 at am Self No Yes   Sig: Take 1 capsule (0.4 mg) by mouth 2 times daily   vitamin C (ASCORBIC ACID) 1000 MG TABS 11/12/2020 at am Self Yes Yes   Sig: Take 1,000 mg by mouth daily      Facility-Administered Medications: None     Allergies   Allergies   Allergen Reactions     Cephalexin Diarrhea     Liraglutide Other (See Comments)     Sulfa Drugs Swelling     Pt has taken  Taken sulfa drugs orally without trouble. He had problems with Sulfa eye drops. Eye swelled up     Victoza      Increased migraine frequency and severity       Physical Exam   Vital Signs: Temp: 98.5  F (36.9  C) Temp src: Oral BP: 136/85 Pulse: 82   Resp: 19 SpO2: 97 % O2 Device: None (Room air)    Weight: 260 lbs 0 oz    Constitutional: awake, alert, cooperative, no apparent distress.   Eyes: Lids and lashes normal, pupils equal, round and reactive to light   ENT: Normocephalic, without obvious abnormality, atraumatic, sinuses nontender on palpation   Hematologic / Lymphatic: no cervical lymphadenopathy   Respiratory: CTABL   Cardiovascular: RRR with no m/r/g   GI: Normal bowel sounds, soft, non-distended, non-tender. Skin: normal skin color, texture, turgor   Musculoskeletal: There is no redness, warmth, or swelling of the joints. Full range of motion noted.   Neurologic: Awake, alert, oriented to name, place and time. Cranial nerves II-XII are grossly intact. Motor is 5 out of 5 bilaterally. Sensory is intact.   Neuropsychiatric: normal mood and affect    Data   Data reviewed today: I reviewed all medications, new labs and imaging results over the last 24 hours. I  personally reviewed the EKG tracing showing Normal sinus rhythm. RBBB, LAFB and the chest x-ray image(s) showing see below.    XR Chest Port 2 views   IMPRESSION: There are no acute infiltrates. The cardiac silhouette is   not enlarged. Pulmonary vasculature is unremarkable  Reading per radiology    Most Recent 3 CBC's:  Recent Labs   Lab Test 11/12/20  1801 11/11/20  2056 10/19/20  2321   WBC 5.7 5.9 6.7   HGB 13.0* 12.9* 12.8*   MCV 93 93 92   * 125* 138*     Most Recent 3 BMP's:  Recent Labs   Lab Test 11/12/20  1801 11/11/20  2056 10/19/20  2321    137 135   POTASSIUM 4.0 4.1 3.9   CHLORIDE 107 106 104   CO2 26 25 25   BUN 22 20 24   CR 1.08 1.05 1.05   ANIONGAP 6 6 6   LUIS 9.0 8.6 8.7   * 88 96     Most Recent 2 LFT's:  Recent Labs   Lab Test 11/12/20 1801 11/11/20 2056   AST 29 23   ALT 55 54   ALKPHOS 93 93   BILITOTAL 0.3 0.3     Most Recent 3 INR's:  Recent Labs   Lab Test 04/17/20 2009 01/27/18  1556 02/19/17  0734   INR 1.18* 1.06 1.08     Most Recent 3 Troponin's:  Recent Labs   Lab Test 11/12/20  1801 11/11/20  2056 10/20/20  0049 01/26/18  1912 01/26/18  1912   TROPI 0.087* 0.049* 0.053*   < >  --    TROPONIN  --   --   --   --  0.00    < > = values in this interval not displayed.     Recent Results (from the past 24 hour(s))   XR Chest 2 Views    Narrative    CHEST TWO VIEWS 11/12/2020 7:44 PM     HISTORY: Chest pain    COMPARISON: October 20, 2020       Impression    IMPRESSION: There are no acute infiltrates. The cardiac silhouette is  not enlarged. Pulmonary vasculature is unremarkable.    LOPEZ SANDS MD

## 2020-11-13 NOTE — PLAN OF CARE
DATE & TIME: 11/13/2020 6627-5590                    Cognitive Concerns/ Orientation : A+O x 4   BEHAVIOR & AGGRESSION TOOL COLOR: green  CIWA SCORE: NA    ABNL VS/O2: VSS on  RA  MOBILITY: Independent  PAIN MANAGMENT: No c/o pain except for Headache. Received PRN Tylenol 1000 mg PO  DIET: Regular  BOWEL/BLADDER: continent of both.  ABNL LAB/BG: Odh42c-6.46. Heparin drip d/c'd, pt discharging  DRAIN/DEVICES: PIV in right hand SL  TELEMETRY RHYTHM: NSR w BBB  SKIN: skin looks fine, however pt didn't want to take his pants off.   TESTS/PROCEDURES: Seen by cardiology today  D/C DAY/GOALS/PLACE: Discharging this evening via w/c transport ([employer]) to home at 8:45 PM  OTHER IMPORTANT INFO: Patient has some baseline neuropathy in his feet. Had Echo today.  Discharging home this evening via w/c transport ([employer]) to home at 8:45 PM    COMMIT TO SIT DONE AND SIGNED OFF Yes

## 2020-11-13 NOTE — PROGRESS NOTES
[facility].  W/C ride set up for 8:45 PM (soonest available time) this evening to go to his home in [place].  Pt updated.

## 2020-11-13 NOTE — PLAN OF CARE
DATE & TIME: 11/13/2020 Overnight    Cognitive Concerns/ Orientation : A+O x 4   BEHAVIOR & AGGRESSION TOOL COLOR: green  CIWA SCORE: NA   ABNL VS/O2: VSS on  RA  MOBILITY: Independent  PAIN MANAGMENT: had small chest pain that diminished within 1 minute without any intervention. No other complaints of pain throughout the night.  DIET: NPO  BOWEL/BLADDER: continent of both.  ABNL LAB/BG: heparin was under level, so we raised it up by 300 units/hour. Next hep Xa is due at 0945.  DRAIN/DEVICES: PIV in right hand running heparin. Dressing had to be reinforced.  TELEMETRY RHYTHM: NSR w BBB  SKIN: skin looks fine, for what I was able to see. He didn't want to take his pants off.   TESTS/PROCEDURES: has cardiology consult in the AM.   D/C DAY/GOALS/PLACE: 2-3 day depending on what interventions are needed and what consults decide.  OTHER IMPORTANT INFO: patient has some baseline neuropathy in his feet.   COMMIT TO SIT DONE AND SIGNED OFF Yes

## 2020-11-13 NOTE — CONSULTS
[facility]    Cardiology Consultation     Date of Admission:  11/12/2020    Assessment & Plan     1.  Non-STEMI, likely demand MI  2.  Normal coronary angiography in 2018 at [facility]  3.  Normal recent cardiac MRI stress study  4.  Hypertension, more difficult to control recently  5.  Hyperlipidemia  6.  Obesity  7.  Bipolar, anxiety and depression disorder  8.  Diabetes mellitus with endorgan manifestations of neuropathy and edema  9.  Polyneuropathy  10.  Restless leg    Recommendations    1.  Non-STEMI: Feel that this likely represents type II demand MI.  This in the context of an EKG which has been at baseline and his ischemic evaluation during these times have demonstrated normal coronary anatomy.  Would focus on additional blood pressure control.  I would continue with his lisinopril and metoprolol with up titration as appropriate.    2.  Hypertension: In addition to continuing with his lisinopril and metoprolol I will add Maxide to his regimen given his edema and for better blood pressure control.  We will add a diuretic to his class of medicines that he is taking    3.  Echocardiogram has been ordered and would follow-up on this.    4.  Provided there is no significant change above his baseline we will continue with medical management from a cardiovascular perspective.    5.  Patient should follow up with his established cardiology care team, Dr Montague as outpatient    Thank you kindly for          Rosio Puentes MD      HPI:    Patient is a pleasant 65-year-old male with multiple medical problems as noted above.  He has been followed in the cardiology clinic by my colleagues, more specifically in the summer with a virtual visit.  He has a notable history of hypertension which has been difficult to control more recently.  He also has chronic back pain with an implanted pump and continuous opioid dependence.  He has chronic abdominal pain, obstructive sleep apnea  chronic fatigue syndrome BPH history of migraines and diabetes mellitus.  He has endorgan manifestations of this with peripheral neuropathy and edema.  In addition to this he is also being treated for bipolar and depression along with anxiety.  He has a notable cardiac history of having an angiogram performed at an outside facility in 2018.  At that point he had a non-STEMI fortunately this did not demonstrate any significant obstructive disease.  His EKG is always demonstrated a normal sinus rhythm with RSR prime pattern with evidence of prior inferior and anterolateral infarct however angiograms have not demonstrated any obstructive coronary coronary artery disease.  His work-up more recently has included a stress cardiac MRI which again demonstrated no evidence of infiltrative heart disease and no ischemia.  LV systolic function was normal.  Patient more recently has had adjustments in his cardiac medications specifically his blood pressure medications as an outpatient.  He had presented yesterday and was evaluated in the ER with significantly elevated blood pressure and minimally elevated troponins.  EKG appears to be at baseline.  Patient in addition to this had chest pain that started in the left and then right upper chest.  It is unclear whether these are associated with the episodes of high blood pressure.  Given these findings it was advised that he remain in the hospital and be admitted.  However patient went home.  Unfortunately had some recurrent symptoms and was quite concerned and eventually presented back in the hospital.  He was subsequently admitted.  Covid test has been negative, cardiology service is subsequently consulted given his presentation and prior history.        Patient Active Problem List   Diagnosis     Obstructive sleep apnea     Hyperlipidemia LDL goal <100     Hypertension goal BP (blood pressure) < 140/90     Advanced directives, counseling/discussion     Migraine     History of  diverticulitis     MENTAL HEALTH     Marianne (H)     Bipolar I disorder (H)     Chronic abdominal pain     Anxiety     Other amyloidosis (H)     Insomnia due to medical condition     Narcotic dependence (H)     Obstructive sleep apnea syndrome     Type 2 diabetes mellitus without complication, without long-term current use of insulin (H)     Restless legs syndrome (RLS)     Type 2 diabetes mellitus with other specified complication (H)     Peripheral edema     Morbid obesity (H)     Stasis dermatitis of both legs     Polyneuropathy associated with underlying disease (H)     Acquired lymphedema     Renal failure     Low blood pressure reading     History of peripheral stem cell transplant (H)     Anxiety and depression     Other cirrhosis of liver (H) possibly from vences      Diarrhea of presumed infectious origin     Diarrhea     Acute kidney failure, unspecified (H)     EKG abnormality     Bilateral leg edema     Onychomycosis     Venous ulcer (H)     Thrombocytopenia, unspecified (H)     Generalized muscle weakness     Bilateral leg weakness     Guillain-[place] syndrome (H)     NSTEMI (non-ST elevated myocardial infarction) (H)       Past Medical History   I have reviewed this patient's medical history and updated it with pertinent information if needed.   Past Medical History:   Diagnosis Date     Acquired lymphedema 2/4/2019     Allergic state      Amyloidosis (H)     followed by hematology Dr. Romeo status post peripheral stem cell transplant     Anxiety 5/11/2016     Anxiety and depression 12/18/2013     Bipolar I disorder (H) 9/1/2015    Under care of psychiatrist [facility]- Dr Rahman doxepin 25 mg, 2 cap bedtime DULoxetine 20 mg once daily  OLANZapine 7.5 mg  1 tab bedtime      Diabetes mellitus (H)     type 2     EKG abnormality 4/20/2020     Guillain Barré syndrome (H)      H/O bone marrow transplant (H)      Headache(784.0)      History of diverticulitis 1/27/2015    1/15-rxed with antibiotics       Hyperlipidemia LDL goal <100 10/31/2010     Hypertension 2007     Hypertension goal BP (blood pressure) < 140/90 10/11/2011     Marianne (H) 8/20/2015     Morbid obesity (H) 8/28/2018     Myalgia and myositis, unspecified      Narcotic dependence (H) 9/6/2016    None in about 6 months- 8/1/17     NSTEMI (non-ST elevated myocardial infarction) (H) 04/19/2020     Obsessive-compulsive disorders      Obstructive sleep apnea 7/16/2008     OCD (obsessive compulsive disorder)      Other acquired absence of organ 94     Other cirrhosis of liver (H) possibly from vences  4/15/2020     Other specified viral warts      Pain in the abdomen      Peripheral edema 5/20/2018    Noncardiac Continue with  furosemide (LASIX) 20 MG tablet,  potassium       chloride SA (K-DUR/KLOR-CON M) 10 MEQ CR  Tab once daily  Basic metabolic panel     Polyneuropathy associated with underlying disease (H) 2/4/2019     Pure hypercholesterolemia      Renal failure 5/10/2019     Restless legs syndrome (RLS) 6/29/2017     Sleep apnea      Stasis dermatitis of both legs 12/31/2018     Type 2 diabetes mellitus with other specified complication (H) 7/10/2017       Past Surgical History   I have reviewed this patient's surgical history and updated it with pertinent information if needed.  Past Surgical History:   Procedure Laterality Date     BMT PROTOCOL      for systemic amyloidosis     BONE MARROW BIOPSY, BONE SPECIMEN, NEEDLE/TROCAR N/A 6/8/2016    Procedure: BIOPSY BONE MARROW;  Surgeon: Nathan Agrawal MD;  Location:  GI     BONE MARROW BIOPSY, BONE SPECIMEN, NEEDLE/TROCAR N/A 2/20/2019    Procedure: BIOPSY BONE MARROW;  Surgeon: Michael Raygoza MD;  Location:  GI     CHOLECYSTECTOMY  1995    lap qian     COLONOSCOPY  2012    hx polyps     ESOPHAGOSCOPY, GASTROSCOPY, DUODENOSCOPY (EGD), COMBINED N/A 5/29/2015    Procedure: COMBINED ESOPHAGOSCOPY, GASTROSCOPY, DUODENOSCOPY (EGD), BIOPSY SINGLE OR MULTIPLE;  Surgeon: John Jacob  MD Terry;  Location:  GI     HERNIA REPAIR, UMBILICAL  2006     [facility] NONSPECIFIC PROCEDURE  94    Cholecystectomy     [facility] NONSPECIFIC PROCEDURE  2000    repair deviated septum       Prior to Admission Medications   Prior to Admission Medications   Prescriptions Last Dose Informant Patient Reported? Taking?   ALPRAZolam (XANAX) 0.5 MG tablet 11/11/2020 at pm Self Yes Yes   Sig: Take 0.5 mg by mouth At Bedtime   NONFORMULARY  at due Dec 1 Self Yes Yes   Sig: by Intrathecal route continuous - + up to 4 boluses daily (100mcg each bolus usually once or 2x per day)    Managed by Dr Pawan [facility]     Medications in Pump:  fentanyl 2000mcg/mL  Bupivacaine 20mg/mL  Morphine 5.8mg/mL     Rate:   Fentanyl 1200mcg/day  Bupivacaine 12mg/day  Morphine 4.2mg/day  Pump Last Fill Date:  09/2020   SUMAtriptan (IMITREX) 50 MG tablet  at prn Self No Yes   Sig: Take 1 tablet (50 mg) by mouth at onset of headache for migraine May repeat in 2 hours. Max 4 tablets/24 hours.   Turmeric 500 MG CAPS 11/12/2020 at am Self Yes Yes   Sig: Take 1,200 mg by mouth daily    acetaminophen (TYLENOL) 325 MG tablet  at prn Self No Yes   Sig: Take 2 tablets (650 mg) by mouth every 4 hours as needed for mild pain   alpha-lipoic acid 100 MG capsule 11/12/2020 at am Self Yes Yes   Sig: Take 200 mg by mouth daily   aspirin (ASPIRIN LOW DOSE) 81 MG tablet 11/12/2020 at am Self No Yes   Sig: Take 1 tablet (81 mg) by mouth daily   atorvastatin (LIPITOR) 20 MG tablet 11/12/2020 at am Self No Yes   Sig: Take 1 tablet (20 mg) by mouth daily   caffeine (NO-DOZE) 200 MG TABS tablet 11/12/2020 at am Self Yes Yes   Sig: Take 400 mg by mouth daily   cariprazine (VRAYLAR) 3 MG CAPS capsule 11/12/2020 at am Self Yes Yes   Sig: Take 3 mg by mouth daily    divalproex sodium delayed-release (DEPAKOTE) 500 MG DR tablet 11/11/2020 at pm Self Yes Yes   Sig: Take 750 mg by mouth At Bedtime    furosemide (LASIX) 20 MG tablet 11/12/2020 at am Self No Yes   Sig:  Take 1 tablet (20 mg) by mouth daily   glimepiride (AMARYL) 1 MG tablet 11/12/2020 at am Self No Yes   Sig: Take 1 tablet (1 mg) by mouth every morning (before breakfast)   lisinopril (ZESTRIL) 10 MG tablet 11/12/2020 at am  No Yes   Sig: Take 1.5 tablets (15 mg) by mouth daily   metFORMIN (GLUCOPHAGE-XR) 500 MG 24 hr tablet 11/12/2020 at am Self No Yes   Sig: Take 1000mg in AM and 1000mg in PM   metoprolol succinate ER (TOPROL-XL) 50 MG 24 hr tablet 11/11/2020 at pm Self No Yes   Sig: TAKE 1 TABLET(50 MG) BY MOUTH DAILY   modafinil (PROVIGIL) 200 MG tablet 11/12/2020 at am Self Yes Yes   Sig: Take 300 mg by mouth daily    multivitamin w/minerals (THERA-VIT-M) tablet 11/12/2020 at am  Yes Yes   Sig: Take 1 tablet by mouth daily   omega-3 acid ethyl esters (LOVAZA) 1 g capsule 11/12/2020 at am Self No Yes   Sig: Take 1 capsule (1 g) by mouth daily   pregabalin (LYRICA) 100 MG capsule 11/12/2020 at x1  No Yes   Sig: Take 1 capsule (100 mg) by mouth 4 times daily   senna-docusate (SENOKOT-S/PERICOLACE) 8.6-50 MG tablet 11/12/2020 at am Self Yes Yes   Sig: Take 1 tablet by mouth daily Take 1 tablet in the morning and 2 tablets in the evening.    sitagliptin (JANUVIA) 100 MG tablet 11/12/2020 at am  No Yes   Sig: Take 1 tablet (100 mg) by mouth daily   tamsulosin (FLOMAX) 0.4 MG capsule 11/12/2020 at am Self No Yes   Sig: Take 1 capsule (0.4 mg) by mouth 2 times daily   vitamin C (ASCORBIC ACID) 1000 MG TABS 11/12/2020 at am Self Yes Yes   Sig: Take 1,000 mg by mouth daily      Facility-Administered Medications: None     Current Facility-Administered Medications   Medication Dose Route Frequency     ALPRAZolam  0.5 mg Oral At Bedtime     aspirin  81 mg Oral Daily     atorvastatin  20 mg Oral Daily     cariprazine  3 mg Oral Daily     divalproex sodium delayed-release  750 mg Oral At Bedtime     lisinopril  15 mg Oral Daily     metoprolol succinate ER  50 mg Oral Daily     polyethylene glycol  17 g Oral Daily      pregabalin  100 mg Oral 4x Daily     senna-docusate  1 tablet Oral BID    Or     senna-docusate  2 tablet Oral BID     sodium chloride (PF)  3 mL Intracatheter Q8H     triamterene-HCTZ  1 tablet Oral Daily     Current Facility-Administered Medications   Medication Last Rate     heparin 1,500 Units/hr (11/13/20 4943)     - MEDICATION INSTRUCTIONS -       Allergies   Allergies   Allergen Reactions     Cephalexin Diarrhea     Liraglutide Other (See Comments)     Sulfa Drugs Swelling     Pt has taken  Taken sulfa drugs orally without trouble. He had problems with Sulfa eye drops. Eye swelled up     Victoza      Increased migraine frequency and severity       Social History    reports that he has never smoked. He has never used smokeless tobacco. He reports that he does not drink alcohol or use drugs.    Family History   Family History   Problem Relation Age of Onset     Cerebrovascular Disease Mother      C.A.D. Mother      Hypertension Mother      Alcohol/Drug Mother      Arthritis Mother      Cerebrovascular Disease Maternal Grandmother      C.A.D. Maternal Grandmother      Alcohol/Drug Maternal Grandmother      C.A.D. Father      Heart Disease Father      Hypertension Father      Alcohol/Drug Father      Allergies Father      Circulatory Father      Depression Father      Respiratory Father      Asthma Father      Alcohol/Drug Paternal Grandfather      Cerebrovascular Disease Paternal Grandfather      Depression Son      Psychotic Disorder Son         anxiety disorder     Depression Daughter      Cerebrovascular Disease Maternal Grandfather      Cerebrovascular Disease Paternal Grandmother      Diabetes Brother      Allergies Sister      Depression Sister      Gynecology Sister        Review of Systems   The comprehensive 10 point Review of Systems is negative other than noted in the HPI or here.     Physical Exam   Vital Signs with Ranges  Temp:  [97.2  F (36.2  C)-98.5  F (36.9  C)] 97.4  F (36.3  C)  Pulse:   "[65-84] 65  Resp:  [10-20] 16  BP: (131-145)/(76-87) 131/76  SpO2:  [93 %-97 %] 95 %  Wt Readings from Last 4 Encounters:   11/13/20 120.5 kg (265 lb 9.6 oz)   11/11/20 123.8 kg (273 lb)   11/11/20 123.8 kg (273 lb)   10/26/20 116.6 kg (257 lb)     No intake/output data recorded.      Vitals: /76 (BP Location: Left arm)   Pulse 65   Temp 97.4  F (36.3  C) (Axillary)   Resp 16   Ht 1.702 m (5' 7\")   Wt 120.5 kg (265 lb 9.6 oz)   SpO2 95%   BMI 41.60 kg/m      Constitutional: awake, alert, cooperative, no apparent distress.   Eyes: Lids and lashes normal, pupils equal, round and reactive to light   ENT: Normocephalic, without obvious abnormality, atraumatic, sinuses nontender on palpation   Hematologic / Lymphatic: no cervical lymphadenopathy   Respiratory: CTABL   Cardiovascular: RRR with no m/r/g   GI: Normal bowel sounds, soft, non-distended, non-tender. Skin: normal skin color, texture, turgor   Musculoskeletal: There is no redness, warmth, or swelling of the joints. Full range of motion noted.   Neurologic: Awake, alert, oriented to name, place and time. Cranial nerves II-XII are grossly intact. Motor is 5 out of 5 bilaterally. Sensory is intact.   Neuropsychiatric: normal mood and affect    Recent Labs   Lab 11/13/20  0616 11/12/20 1801 11/11/20 2056   TROPI 0.031 0.087* 0.049*       Recent Labs   Lab 11/13/20  0616 11/12/20  1801 11/11/20 2056   WBC 6.0 5.7 5.9   HGB 12.8* 13.0* 12.9*   MCV 94 93 93   * 135* 125*    139 137   POTASSIUM 4.4 4.0 4.1   CHLORIDE 106 107 106   CO2 23 26 25   BUN 25 22 20   CR 1.05 1.08 1.05   GFRESTIMATED 74 72 74   GFRESTBLACK 86 83 86   ANIONGAP 7 6 6   LUIS 8.7 9.0 8.6   * 102* 88   ALBUMIN  --  3.7 3.8   PROTTOTAL  --  6.7* 6.6*   BILITOTAL  --  0.3 0.3   ALKPHOS  --  93 93   ALT  --  55 54   AST  --  29 23   LIPASE  --  202  --    TROPI 0.031 0.087* 0.049*     Recent Labs   Lab Test 02/24/20  1219 05/13/19  1153 09/01/15  1201 09/01/15  1201 " 12/03/14  1015   CHOL 163 119   < > 195 142   HDL 32* 26*   < > 33* 25*   * 39   < > 140* 71   TRIG 121 269*   < > 109 232*   CHOLHDLRATIO  --   --   --  5.9* 5.7*    < > = values in this interval not displayed.     Recent Labs   Lab 11/13/20  0616 11/12/20 1801 11/11/20 2056   WBC 6.0 5.7 5.9   HGB 12.8* 13.0* 12.9*   HCT 37.1* 37.6* 37.2*   MCV 94 93 93   * 135* 125*     No results for input(s): PH, PHV, PO2, PO2V, SAT, PCO2, PCO2V, HCO3, HCO3V in the last 168 hours.  Recent Labs   Lab 11/12/20 1801   NTBNPI 96     No results for input(s): DD in the last 168 hours.  No results for input(s): SED, CRP in the last 168 hours.  Recent Labs   Lab 11/13/20 0616 11/12/20 1801 11/11/20 2056   * 135* 125*     Recent Labs   Lab 11/12/20 1801   TSH 2.47     Recent Labs   Lab 11/12/20 2025   COLOR Yellow   APPEARANCE Clear   URINEGLC Negative   URINEBILI Negative   URINEKETONE Negative   SG 1.014   UBLD Negative   URINEPH 5.0   PROTEIN Negative   NITRITE Negative   LEUKEST Negative   RBCU 1   WBCU 1       Imaging:  Recent Results (from the past 48 hour(s))   XR Chest 2 Views    Narrative    CHEST TWO VIEWS 11/12/2020 7:44 PM     HISTORY: Chest pain    COMPARISON: October 20, 2020       Impression    IMPRESSION: There are no acute infiltrates. The cardiac silhouette is  not enlarged. Pulmonary vasculature is unremarkable.    LOPEZ SANDS MD       Echo:  No results found for this or any previous visit (from the past 4320 hour(s)).

## 2020-11-13 NOTE — PROGRESS NOTES
[facility]    Hospitalist Progress Note    Assessment & Plan   Parmjit Hernández is a 65 year old male who was admitted on 11/12/2020.  Patient was admitted for chest pain evaluation.  CAD  NSTEMI  Hypertension  Dyslipidemia  --- EKG on admission shows Normal sinus rhythm with right bundle branch block and Left anterior fascicular block. Trop 0.049 -> 0.087 over the last day. Managed PTA with aspirin, atorvastatin, lisinopril, lasix.   --- Appreciate cardiology input, appears to be type II demand MI, no plan for angiogram, echocardiogram pending, if following echocardiogram results are available possible discharge home today.  Continue telemetry until discharge continue his PTA cardiac medications.  Heparin drip stopped.  His troponins are now back to baseline.  ---His blood pressures are occasionally elevated, he is already on lisinopril, dose increased to 90 mg a day from 15 mg, continue his metoprolol 50 mg a day, added Maxide, patient is on Lasix at home, I did ask him to hold off on Lasix for now, reevaluate how his blood pressures are in the adjust Maxide.     Peripheral neuropathy  Polyneuropathy  Stasis dermatitis and lymphedema bilateral legs  Physical deconditioning  Morbid Obesity  Restless Legs Syndrome  Patient reports he can't walk but he has no focal deficits, no demonstrated weakness on exam. Multiple visits to doctors over the past several weeks for similar complaints.  Suspect multifactorial etiology including the peripheral neuropathy associated with diabetes his lymphedema, morbid obesity and generalized deconditioning.  - PTA lyrica continued     Anxiety  Depression  Bipolar Disorder  --- PTA managed with cariprazine, depakote, modafinil, caffeine He did previously take lithium , but now stopped  - PTA medications resumed     Diabetes, type 2 with peripheral neuropathy  Managed PTA with metformin, glimepiride, sitagliptin. Recent hemoglobin A1c 5.5% from June  2020 indicating reasonable glycemic control.     - sliding scale insulin as needed  - Hold PTA regimen  - hypoglycemia protocol    DVT Prophylaxis: Heparin drip stopped today  Code Status: Full Code     Disposition: Expected discharge  later today after echocardiogram results obtained.    Sonali Araya MD  Text Page   (7am to 6pm)    Interval History   No active chest pain noted since admission, blood pressures are stable, started on Maxide here, patient is quite anxious, he mentions that he has very high blood pressures at home, since his admission his blood pressures have been stable.  -Data reviewed today: I reviewed all new labs and imaging results over the last 24 hours.    Physical Exam   Temp: 97.4  F (36.3  C) Temp src: Axillary BP: (!) 141/81 Pulse: 65   Resp: 16 SpO2: 95 % O2 Device: None (Room air)    Vitals:    11/12/20 1751 11/13/20 0219   Weight: 117.9 kg (260 lb) 120.5 kg (265 lb 9.6 oz)     Vital Signs with Ranges  Temp:  [97.2  F (36.2  C)-98.5  F (36.9  C)] 97.4  F (36.3  C)  Pulse:  [65-84] 65  Resp:  [10-20] 16  BP: (131-145)/(76-87) 141/81  SpO2:  [93 %-97 %] 95 %  No intake/output data recorded.    Constitutional:Awake, alert, cooperative, no apparent distress, obese   Respiratory: Clear to auscultation bilaterally, no crackles or wheezing  Cardiovascular: Regular rate and rhythm, normal S1 and S2, and no murmur noted  GI: Normal bowel sounds, soft, non-distended, non-tender  Skin/Integumen: No rashes, no cyanosis, no edema  Neuro : moving all 4 extremities, no focal deficit noted     Medications     - MEDICATION INSTRUCTIONS -         ALPRAZolam  0.5 mg Oral At Bedtime     aspirin  81 mg Oral Daily     atorvastatin  20 mg Oral Daily     cariprazine  3 mg Oral Daily     divalproex sodium delayed-release  750 mg Oral At Bedtime     [START ON 11/14/2020] lisinopril  20 mg Oral Daily     metoprolol succinate ER  50 mg Oral Daily     polyethylene glycol  17 g Oral Daily     pregabalin  100 mg  Oral 4x Daily     senna-docusate  1 tablet Oral BID    Or     senna-docusate  2 tablet Oral BID     sodium chloride (PF)  3 mL Intracatheter Q8H     tamsulosin  0.4 mg Oral Daily     triamterene-HCTZ  1 tablet Oral Daily       Data   Recent Labs   Lab 11/13/20  0616 11/12/20  1801 11/11/20 2056   WBC 6.0 5.7 5.9   HGB 12.8* 13.0* 12.9*   MCV 94 93 93   * 135* 125*    139 137   POTASSIUM 4.4 4.0 4.1   CHLORIDE 106 107 106   CO2 23 26 25   BUN 25 22 20   CR 1.05 1.08 1.05   ANIONGAP 7 6 6   LUIS 8.7 9.0 8.6   * 102* 88   ALBUMIN  --  3.7 3.8   PROTTOTAL  --  6.7* 6.6*   BILITOTAL  --  0.3 0.3   ALKPHOS  --  93 93   ALT  --  55 54   AST  --  29 23   LIPASE  --  202  --    TROPI 0.031 0.087* 0.049*     Recent Labs   Lab 11/13/20  0616 11/12/20  1801 11/11/20 2056   * 102* 88       Imaging:   Recent Results (from the past 24 hour(s))   XR Chest 2 Views    Narrative    CHEST TWO VIEWS 11/12/2020 7:44 PM     HISTORY: Chest pain    COMPARISON: October 20, 2020       Impression    IMPRESSION: There are no acute infiltrates. The cardiac silhouette is  not enlarged. Pulmonary vasculature is unremarkable.    LOPEZ SANDS MD

## 2020-11-13 NOTE — ED PROVIDER NOTES
History     Chief Complaint:  Chest Pain and Hypertension    HPI   Parmjit Hernández is a 65 year old male with a history of hypertension, hyperlipidemia, NSTEMI, and type 2 diabetes who presents via EMS with chest pain and hypertension. He was evaluated for similar symptoms in the ED yesterday and workup showed troponin of 0.049 and a non ischemic ECG. Admission for observation was discussed with the patient and he declined. This morning, he took 15 mg of lisinopril and noted his blood pressure was 147/87. He later took another reading and noted it to be 188/108, at which time he was experiencing chest pain and weakness in his left arm. Since then, he has experienced intermittent episodes of mid sternal chest pain that radiates to the left. He also notes that he is experiencing increased fatigue and weakness above his baseline, with the development of new weakness in his left leg en route. He describes this weakness as buckling of his knees. He attributes these symptoms to his Guillian Brookings syndrome, which was diagnosed 2-3 weeks ago. He denies shortness of breath.      Allergies:  Cephalexin  Liraglutide  Sulfa drugs  Victoza    Medications:    Alpha-lipoid acid  Xanax  Aspirin  Lipitor  Caffeine  Vraylar  Depakote  Lasix  Amaryl  Zestril  Metformin  Metoprolol  Provigil  Multivitamin  Lovaza  Lyrica  Senokot  Januvia  Imitrex  Flomax  Vitamin C    Past Medical History:    Acquired lymphedema  Allergic state  Amyloidosis  Anxiety  Depression  Bipolar 1 disorder  Diabetes mellitus  Guillain Brookings syndrome  Diverticulitis  Hyperlipidemia  Hypertension  Marianne  Myalgia  Narcotic dependence  NSTEMI  Obsessive compulsive disorder  Obstructive sleep apnea  Cirrhosis of liver  Polyneuropathy  Hypercholesterolemia  Renal failure  RLS  Stasis dermatitis  Migraine  Chronic abdominal pain   Acute kidney failure  Onychomycosis   Venous ulcer    Past Surgical History:    BMT protocol  Bone marrow  "biopsy  Cholecystectomy  Umbilical hernia repair  Deviated septum repair    Family History:    Cerebrovascular disease  Hypertension  Alcohol/drug abuse  Arthritis  Heart disease  Depression  Asthma  Anxiety disorder  Diabetes    Social History:  Smoking status: never  Alcohol use: no  Marital Status:   [4]     Review of Systems   Constitutional: Positive for fatigue. Negative for fever.   Respiratory: Negative for shortness of breath.    Cardiovascular: Positive for chest pain.   Neurological: Positive for weakness.   All other systems reviewed and are negative.    Physical Exam     Patient Vitals for the past 24 hrs:   BP Temp Temp src Pulse Resp SpO2 Height Weight   11/13/20 0219 131/79 -- -- 76 14 93 % 1.702 m (5' 7\") 120.5 kg (265 lb 9.6 oz)   11/13/20 0152 135/82 -- -- -- -- -- -- --   11/12/20 2330 (!) 145/87 -- -- 84 10 -- -- --   11/12/20 2215 -- -- -- 81 15 -- -- --   11/12/20 2200 -- -- -- 80 13 -- -- --   11/12/20 1830 136/85 -- -- 82 19 -- -- --   11/12/20 1751 (!) 145/84 98.5  F (36.9  C) Oral 78 20 97 % 1.702 m (5' 7\") 117.9 kg (260 lb)     Physical Exam  Constitutional: Alert  HENT:    Nose: Nose normal.    Mouth/Throat: Oropharynx is clear, mucous membranes are moist  Eyes: EOM are normal. Pupils are equal, round, and reactive to light.   CV: Regular rate and rhythm, no murmurs, rubs or gallops.  Resp: Clear lungs to auscultation, all lung fields. Normal respiratory effort.   GI: Distended abdomen, non tender  MSK: Normal range of motion. No deformity.   Neurological:   A/Ox3  4-5 strength to left lower extremity;   Sensation intact to light touch throughout the upper and lower extremities;   Skin: Compression stockings on bilateral lower extremities but continues to have 1+ edema. Skin is warm and dry.    Emergency Department Course   ECG:  ECG taken at 1816, ECG read at 1820  Normal sinus rhythm  Left axis deviation  Right bundle branch block  No ST elevation or depression   No T wave " inversion .  No significant change when compared to EKG dated 11/11/20.   Rate 81 bpm. VA interval 202 ms. QRS duration 128 ms. QT/QTc 394/457 ms. P-R-T axes 29 -56 -10.     ECG:  ECG taken at 1921, ECG read at 1950  Normal sinus rhythm  Right bundle branch block  Left anterior fascicular block  Bifascicular block  Abnormal ECG  No ST elevation or depression   No T wave inversion   No significant change when compared to EKG dated 11/12/20.   Rate 77 bpm. VA interval 190 ms. QRS duration 124 ms. QT/QTc 392/443 ms. P-R-T axes 15 -53 -19.     Imaging:  Radiographic findings were communicated with the patient who voiced understanding of the findings.  XR Chest Port 2 views   IMPRESSION: There are no acute infiltrates. The cardiac silhouette is   not enlarged. Pulmonary vasculature is unremarkable  Reading per radiology    Laboratory:  Laboratory findings were communicated with the patient who voiced understanding of the findings.  CBC: HGB 13.0 (L),  (L) o/w WNL. (WBC 5.7)   CMP: Glucose 102 (H), protein total 6.7 (L), o/w WNL (Creatinine: 1.08)    Troponin (Collected 1801): 0.087 (H)    Lipase: 202  Nt probnp inpatient: 96  TSH with free T4 reflex: 2.47    UA with micro: negative    Asymptomatic COVID: negative    Interventions:  1824 Acetaminophen, 1000 mg, PO  1825 Decadron, 10 mg, IV  2037 Xanax, 0.5 mg, PO  2117 heparin loading dose, 6,000 units, IV  2117 heparin 25,000 units in 0.45% NaCl 250 mL, 1,200 units/hr, IV    Emergency Department Course:  Past medical records, nursing notes, and vitals reviewed.  1809: I performed an exam of the patient and obtained history, as documented above.     An ECG was obtained while in the emergency department, findings above.     Blood drawn. This was sent to the lab for further testing, results above.    The patient provided a urine sample here in the emergency department. This was sent for laboratory testing, findings above.    A nasopharyngeal swab was obtained.      The patient was sent for a chest XR while in the emergency department, findings above.    An second ECG was obtained, findings above.    Findings and plan explained to the Patient who consents to admission.     2031: Discussed the patient with Dr. Martinez, who will admit the patient to a med/surg bed for further monitoring, evaluation, and treatment.     Impression & Plan      Medical Decision Making:  This is a 65-year-old male who comes in with chest pain.  I saw him yesterday and he does have a history of chronic troponin elevations.  I offered him admission yesterday and he elected to be discharged home.  He returned today after chest pain returned and he continued to have discomfort.  Chest pain relieved by nitroglycerin at this time.  Troponin is now double what it was yesterday and higher than his typical chronic elevations in troponin.  EKG is nonischemic with no significant ischemic changes compared to yesterday.  Heparin gtt was started.  Discussed the patient with Dr. Martinez who was amenable to admission.  Patient will likely require cardiac catheterization as he had a stress test back in May.      Critical Care time:  36 minutes    Diagnosis:    ICD-10-CM    1. NSTEMI (non-ST elevated myocardial infarction) (H)  I21.4        Disposition:  Admitted to med/surg    Scribe Disclosure:  I, Hyacinth Zuniga, am serving as a scribe at 6:11 PM on 11/12/2020 to document services personally performed by Sherwin Rivera DO based on my observations and the provider's statements to me.     Hyacinth Zuniga  11/12/2020   [facility] EMERGENCY DEPT       Sherwin Rivera DO  11/13/20 0235

## 2020-11-13 NOTE — TELEPHONE ENCOUNTER
FYI~ A refill has been made to the  assistance program for Lyrica.    A 90 day supply of Lyrica will be delivered to Erickson's home within 3-5 business days.    Thank you,    Merle Morales  Prescription Assistance

## 2020-11-13 NOTE — CONSULTS
Care Management Initial Consult   Discussed discharge plans with patient. Patient stataing he lives alone,admits to being highly anxious and aware of how to manage it. Voices main concern of high blood pressures at home,states he checks his blood pressure frequently.   Presently calm and wants  to be discharged if all tests negative. Discussed coping skills at home for anxiety.   Primary appointment made. Requesting medivan transportation and willing to pay for the private cost.  General Information  Assessment completed with: Patient,    Type of CM/SW Visit: Offer D/C Planning  Primary Care Provider verified and updated as needed: Yes   Readmission within the last 30 days: current reason for admission unrelated to previous admission   Return Category: New Diagnosis  Reason for Consult: care coordination/care conference;discharge planning;transportation  Advance Care Planning:            Communication Assessment  Patient's communication style: spoken language (English or Bilingual)    Hearing Difficulty or Deaf: no   Wear Glasses or Blind: no    Cognitive  Cognitive/Neuro/Behavioral: WDL           Mood/Behavior: calm;cooperative          Living Environment:   People in home: alone     Current living Arrangements: house      Able to return to prior arrangements:         Family/Social Support:  Care provided by: self  Provides care for:       Children          Description of Support System: Involved    Support Assessment: Adequate family and caregiver support    Current Resources:   Skilled Home Care Services:    Community Resources:    Equipment currently used at home: none  Supplies currently used at home:      Employment/Financial:  Employment Status:          Financial Concerns:             Lifestyle & Psychosocial Needs:  Lifestyle     Physical activity     Days per week: 1 day     Minutes per session: Not on file     Stress: Not on file     Social Needs     Financial resource strain: Not very hard     Food  insecurity     Worry: Never true     Inability: Never true     Transportation needs     Medical: No     Non-medical: No     Socioeconomic History     Marital status:      Spouse name: Not on file     Number of children: 3     Years of education: Not on file     Highest education level: Not on file   Occupational History     Employer: [employer]     Tobacco Use     Smoking status: Never Smoker     Smokeless tobacco: Never Used   Substance and Sexual Activity     Alcohol use: No     Alcohol/week: 0.0 standard drinks     Drug use: No     Sexual activity: Never       Functional Status:  Prior to admission patient needed assistance:   Dependent ADLs:: Independent          Additional Information: See above      Vinay Klein RN  Inpatient Care Coordinator  [facility]  #[phone]

## 2020-11-13 NOTE — ED NOTES
"[facility]  ED Nurse Handoff Report    ED Chief complaint: Chest Pain and Hypertension      ED Diagnosis:   Final diagnoses:   None       Code Status: Full Code    Allergies:   Allergies   Allergen Reactions     Cephalexin Diarrhea     Liraglutide Other (See Comments)     Sulfa Drugs Swelling     Pt has taken  Taken sulfa drugs orally without trouble. He had problems with Sulfa eye drops. Eye swelled up     Victoza      Increased migraine frequency and severity       Patient Story: pt was seen in ED yesterday for chest pain and had troponin of 0.04. Comes in to ED today with 0.08 troponin and c/o intermittent chest pain, hypertension and weakness especially in left leg. Pt has recent diagnosis of guillian barre syndrome approx 3 weeks ago.   Focused Assessment:  Pt is alert, oriented x4, very anxious. Respirations easy and unlabored, denies cough or sob. Denies fevers or chills. Chest pain is midsternal radiating to the left including left arm at times. Some b/l edema noted to lower extremities that pt wears compression stockings for.     Treatments and/or interventions provided: decadron for GBS. Labs, iv access. Heparin drip initiated  Patient's response to treatments and/or interventions: pt has been normotensive in ED, last pressure 136/85  To be done/followed up on inpatient unit:  n/a    Does this patient have any cognitive concerns?: none    Activity level - Baseline/Home:  Independent  Activity Level - Current:   Independent    Patient's Preferred language: English   Needed?: No    Isolation: None and COVID r/o and special precautions  Infection: Not Applicable  Patient tested for COVID 19 prior to admission: YES  Bariatric?: No    Vital Signs:   Vitals:    11/12/20 1751 11/12/20 1830   BP: (!) 145/84 136/85   Pulse: 78 82   Resp: 20 19   Temp: 98.5  F (36.9  C)    TempSrc: Oral    SpO2: 97%    Weight: 117.9 kg (260 lb)    Height: 1.702 m (5' 7\")        Cardiac Rhythm:Cardiac " Rhythm: Normal sinus rhythm    Was the PSS-3 completed:   Yes  What interventions are required if any?               Family Comments: n/a  OBS brochure/video discussed/provided to patient/family: N/A              Name of person given brochure if not patient: n/a              Relationship to patient: n/a    For the majority of the shift this patient's behavior was Green.   Behavioral interventions performed were reassurance and information.    ED NURSE PHONE NUMBER: *[id number]

## 2020-11-13 NOTE — DISCHARGE SUMMARY
[facility]    Discharge Summary  Hospitalist    Date of Admission:  11/12/2020  Date of Discharge:  11/13/2020  8:59 PM  Discharging Provider: Sonali Araya MD    Discharge Diagnoses   Shortness of breath and chest discomfort with elevated troponins    History of Present Illness   Please review admission history and physical.    Hospital Course   Parmjit Hernández was admitted on 11/12/2020.  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  Patient was admitted for chest pain evaluation.  CAD  NSTEMI  Hypertension  Dyslipidemia   EKG on admission shows Normal sinus rhythm with right bundle branch block and Left anterior fascicular block. Trop 0.049 -> 0.087 over the last day. Managed PTA with aspirin, atorvastatin, lisinopril, lasix.  Patient was seen by cardiology services, appears to be type II demand MI, no plan for angiogram, echocardiogram was obtained, echocardiogram did not show any wall motion abnormalities, plan is to discharge patient home and follow-up with cardiology outpatient.  He was on telemetry during his stay here.  His PTA cardiac medications were continued during his stay here, he was briefly put on heparin drip which was stopped after seen by cardiology team, his troponin is currently back to baseline and patient does not have any active chest pain or shortness of breath.  Some of his symptoms were related to his elevated blood  pressures.  ---His blood pressures are occasionally elevated, he is already on lisinopril, dose increased to 20 mg a day from 15 mg, continue his metoprolol 50 mg a day, added Maxide, patient is on Lasix at home, I did ask him to hold off on Lasix for now, reevaluate how his blood pressures are in the adjust Maxide.  Patient need to keep blood pressure diary at take it to his primary care for his follow-up visit.  Will need decision whether to continue Lasix along with Maxide at this time, for now hold off on Lasix.     Peripheral neuropathy  Polyneuropathy  Stasis dermatitis and lymphedema bilateral legs  Physical deconditioning  Morbid Obesity  Restless Legs Syndrome  Patient reports he can't walk but he has no focal deficits, no demonstrated weakness on exam. Multiple visits to doctors over the past several weeks for similar complaints.  Suspect multifactorial etiology including the peripheral neuropathy associated with diabetes his lymphedema, morbid obesity and generalized deconditioning.  - PTA lyrica continued     Anxiety  Depression  Bipolar Disorder  He was on his prior to admission medications during his hospitalization.     Diabetes, type 2 with peripheral neuropathy  Managed PTA with metformin, glimepiride, sitagliptin. Recent hemoglobin A1c 5.5% from June 2020 indicating reasonable glycemic control.  Patient was on sliding scale during his stay here, his PT regimen is initiated upon discharge.      Sonali Araya MD, MD    Significant Results and Procedures       Pending Results     Unresulted Labs Ordered in the Past 30 Days of this Admission     No orders found from 10/13/2020 to 11/13/2020.          Code Status   Full Code       Primary Care Physician   Vince Henry    Physical Exam                      Vitals:    11/12/20 1751 11/13/20 0219   Weight: 117.9 kg (260 lb) 120.5 kg (265 lb 9.6 oz)     Vital Signs with Ranges     No intake/output data recorded.    The patient was examined on the day  of discharge.    Discharge Disposition   Discharged to home  Condition at discharge: Stable    Consultations This Hospital Stay   PHARMACY IP CONSULT  PHARMACY IP CONSULT  PHARMACY TO DOSE HEPARIN  CARDIOLOGY IP CONSULT  CARE MANAGEMENT / SOCIAL WORK IP CONSULT    Time Spent on this Encounter   I, Sonali Araya MD, personally saw the patient today and spent greater than 30 minutes discharging this patient.    Discharge Orders      MED THERAPY MANAGE REFERRAL      Reason for your hospital stay    Chest pain possibly due to elevated blood pressures     Follow-up and recommended labs and tests     Follow up with primary care provider, Vince Henry, within 7 days to evaluate medication change and for hospital follow- up.  The following labs/tests are recommended: BMP in 5 to 6 days, patient is started on Maxide and lisinopril dose slightly increased to 20 mg from 15 mg.  Need to monitor his blood pressures, patient to keep a diary and bring it for next follow-up appointment..     Activity    Your activity upon discharge: activity as tolerated     Discharge Instructions    Please check your blood pressures twice daily, first thing in the morning when you wake up before your breakfast, later prior to going to bed, document the blood pressures in the diary twice a day and take it with you during your next PCP appointment, discussed with primary care about as stopping the Lasix, you are on another diuretic or the Maxide, repeat BMP during your next visit and monitor blood pressures, depending on that decide on continuing Lasix or not, for now hold it.     Diet    Follow this diet upon discharge: Orders Placed This Encounter     Heart healthy diet     Discharge Medications   Discharge Medication List as of 11/13/2020  7:53 PM      START taking these medications    Details   triamterene-HCTZ (MAXZIDE-25) 37.5-25 MG tablet Take 1 tablet by mouth daily, Disp-30 tablet, R-0, E-Prescribe         CONTINUE these medications  which have CHANGED    Details   furosemide (LASIX) 20 MG tablet Take 1 tablet (20 mg) by mouth daily, Disp-30 tablet, R-0, HistoricalHold this medication until evaluated by primary care as you are started on Maxide which is also a diuretic.      lisinopril (ZESTRIL) 20 MG tablet Take 1 tablet (20 mg) by mouth daily, Disp-30 tablet, R-1, E-Prescribe         CONTINUE these medications which have NOT CHANGED    Details   acetaminophen (TYLENOL) 325 MG tablet Take 2 tablets (650 mg) by mouth every 4 hours as needed for mild pain, Transitional      alpha-lipoic acid 100 MG capsule Take 200 mg by mouth daily, Historical      ALPRAZolam (XANAX) 0.5 MG tablet Take 0.5 mg by mouth At Bedtime, Historical      aspirin (ASPIRIN LOW DOSE) 81 MG tablet Take 1 tablet (81 mg) by mouth daily, Disp-30 tablet,R-3, E-Prescribe      atorvastatin (LIPITOR) 20 MG tablet Take 1 tablet (20 mg) by mouth daily, Disp-90 tablet, R-1, E-Prescribe      caffeine (NO-DOZE) 200 MG TABS tablet Take 400 mg by mouth daily, Historical      cariprazine (VRAYLAR) 3 MG CAPS capsule Take 3 mg by mouth daily , Historical      divalproex sodium delayed-release (DEPAKOTE) 500 MG DR tablet Take 750 mg by mouth At Bedtime , Disp-10 tablet, R-0, HistoricalUnder care of Dr KING ([place] psychistrist)      glimepiride (AMARYL) 1 MG tablet Take 1 tablet (1 mg) by mouth every morning (before breakfast), Disp-90 tablet, R-0, E-Prescribe      metFORMIN (GLUCOPHAGE-XR) 500 MG 24 hr tablet Take 1000mg in AM and 1000mg in PM, Disp-360 tablet, R-1, E-Prescribe      metoprolol succinate ER (TOPROL-XL) 50 MG 24 hr tablet TAKE 1 TABLET(50 MG) BY MOUTH DAILY, Disp-90 tablet, R-1, E-Prescribe      modafinil (PROVIGIL) 200 MG tablet Take 300 mg by mouth daily , Historical      multivitamin w/minerals (THERA-VIT-M) tablet Take 1 tablet by mouth daily, Historical      NONFORMULARY by Intrathecal route continuous - + up to 4 boluses daily (100mcg each bolus usually once or 2x per  day)    Managed by Dr Pawan Shaw, [facility]     Medications in Pump:  fentanyl 2000mcg/mL  Bupivacaine 20mg/mL  Morphine 5.8mg/mL     Rate:   Fe ntanyl 1200mcg/day  Bupivacaine 12mg/day  Morphine 4.2mg/day  Pump Last Fill Date:  09/2020, Historical      omega-3 acid ethyl esters (LOVAZA) 1 g capsule Take 1 capsule (1 g) by mouth daily, Disp-90 capsule, R-3, E-Prescribe      pregabalin (LYRICA) 100 MG capsule Take 1 capsule (100 mg) by mouth 4 times daily, Disp-360 capsule, R-3, Local Print      senna-docusate (SENOKOT-S/PERICOLACE) 8.6-50 MG tablet Take 1 tablet by mouth daily Take 1 tablet in the morning and 2 tablets in the evening. , Historical      sitagliptin (JANUVIA) 100 MG tablet Take 1 tablet (100 mg) by mouth daily, Disp-90 tablet, R-0, E-Prescribe      SUMAtriptan (IMITREX) 50 MG tablet Take 1 tablet (50 mg) by mouth at onset of headache for migraine May repeat in 2 hours. Max 4 tablets/24 hours., Disp-8 tablet, R-1, E-Prescribe      tamsulosin (FLOMAX) 0.4 MG capsule Take 1 capsule (0.4 mg) by mouth 2 times daily, Disp-30 capsule, R-3, Local Print      Turmeric 500 MG CAPS Take 1,200 mg by mouth daily , Historical      vitamin C (ASCORBIC ACID) 1000 MG TABS Take 1,000 mg by mouth daily, Historical           Allergies   Allergies   Allergen Reactions     Cephalexin Diarrhea     Liraglutide Other (See Comments)     Sulfa Drugs Swelling     Pt has taken  Taken sulfa drugs orally without trouble. He had problems with Sulfa eye drops. Eye swelled up     Victoza      Increased migraine frequency and severity     Data   Most Recent 3 CBC's:  Recent Labs   Lab Test 11/15/20  2229 11/13/20  0616 11/12/20  1801   WBC 6.9 6.0 5.7   HGB 13.4 12.8* 13.0*   MCV 93 94 93    128* 135*      Most Recent 3 BMP's:  Recent Labs   Lab Test 11/15/20  2229 11/13/20  0616 11/12/20  1801    136 139   POTASSIUM 4.2 4.4 4.0   CHLORIDE 103 106 107   CO2 25 23 26   BUN 27 25 22   CR 1.06 1.05 1.08   ANIONGAP 6  7 6   LUIS 8.9 8.7 9.0   * 145* 102*     Most Recent 2 LFT's:  Recent Labs   Lab Test 11/15/20  2229 20  180   AST 23 29   ALT 57 55   ALKPHOS 90 93   BILITOTAL 0.4 0.3     Most Recent INR's and Anticoagulation Dosing History:  Anticoagulation Dose History     Recent Dosing and Labs Latest Ref Rng & Units 2015    INR 0.86 - 1.14 1.1 1.1 1.13 1.15(H) 1.08 1.06 1.18(H)        Most Recent 3 Troponin's:  Recent Labs   Lab Test 11/15/20  2229 11/13/20  0616 20  1801 18  1912 18  191   TROPI 0.025 0.031 0.087*   < >  --    TROPONIN  --   --   --   --  0.00    < > = values in this interval not displayed.     Most Recent Cholesterol Panel:  Recent Labs   Lab Test 20  1219   CHOL 163   *   HDL 32*   TRIG 121     Most Recent 6 Bacteria Isolates From Any Culture (See EPIC Reports for Culture Details):  Recent Labs   Lab Test 06/10/20  1129 19  0827 17  1904 17  1848   CULT No growth after 4 weeks No beta hemolytic Streptococcus Group A isolated No growth No growth     Most Recent TSH, T4 and A1c Labs:  Recent Labs   Lab Test 20  1801 06/10/20  1031 06/10/20  1031   TSH 2.47   < >  --    A1C  --   --  5.5    < > = values in this interval not displayed.     Results for orders placed or performed during the hospital encounter of 20   XR Chest 2 Views    Narrative    CHEST TWO VIEWS 2020 7:44 PM     HISTORY: Chest pain    COMPARISON: 2020       Impression    IMPRESSION: There are no acute infiltrates. The cardiac silhouette is  not enlarged. Pulmonary vasculature is unremarkable.    LOPEZ SANDS MD   Echocardiogram Complete    Narrative    [id number]  [id number]  [id number]  [id number]^MIKAEL^DELIO           [facility]  Echocardiography Laboratory  [address] [place] [id number]        Name: GENIE JULIO RONAL  MRN: [id number]  : 1955  Study Date:  11/13/2020 03:02 PM  Age: 65 yrs  Gender: Male  Patient Location: [facility]  Reason For Study: Chest Pain  Ordering Physician: DELIO YOUSSEF  Referring Physician: LISSETH GARCIA  Performed By: Jose Bolaños RDCS     BSA: 2.3 m2  Height: 67 in  Weight: 260 lb  HR: 64  BP: 135/82 mmHg  _____________________________________________________________________________  __        Procedure  Complete Portable Echo Adult. Optison (NDC #[id number]) given intravenously.  _____________________________________________________________________________  __        Interpretation Summary     Left ventricular systolic function is normal.  The visual ejection fraction is estimated at 60-65%.  There is mild concentric left ventricular hypertrophy.  The right ventricle is normal in structure, function and size.  No significant valve dysfunction.  The inferior vena cava is normal.  There is no pericardial effusion.  Normal size ascending aorta.  _____________________________________________________________________________  __        Left Ventricle  The left ventricle is normal in size. There is mild concentric left  ventricular hypertrophy. Left ventricular systolic function is normal. The  visual ejection fraction is estimated at 60-65%. Left ventricular diastolic  function is normal. Normal left ventricular wall motion.     Right Ventricle  The right ventricle is normal in structure, function and size.     Atria  Normal left atrial size. Right atrial size is normal.     Mitral Valve  The mitral valve is normal in structure and function.        Tricuspid Valve  The tricuspid valve is normal in structure and function. Right ventricular  systolic pressure could not be approximated due to inadequate tricuspid  regurgitation.     Aortic Valve  Normal tricuspid aortic valve.     Pulmonic Valve  The pulmonic valve is normal in structure and function.     Vessels  Normal size ascending aorta. The inferior vena cava is normal.     Pericardium  There is no  pericardial effusion.     _____________________________________________________________________________  __  MMode/2D Measurements & Calculations  IVSd: 1.2 cm  LVIDd: 4.8 cm  LVIDs: 3.0 cm  LVPWd: 1.0 cm  FS: 37.5 %  LV mass(C)d: 202.0 grams  LV mass(C)dI: 89.3 grams/m2     Ao root diam: 3.6 cm  LA dimension: 4.2 cm  asc Aorta Diam: 3.5 cm  LA/Ao: 1.2  LA Volume (BP): 66.0 ml  LA Volume Index (BP): 29.2 ml/m2  RWT: 0.43        Doppler Measurements & Calculations  MV E max yassine: 84.4 cm/sec  MV A max yassine: 64.8 cm/sec  MV E/A: 1.3  MV dec time: 0.26 sec     PA acc time: 0.13 sec  E/E' avg: 10.1  Lateral E/e': 9.3  Medial E/e': 10.8           _____________________________________________________________________________  __           Report approved by: Savannah Hager 11/13/2020 04:11 PM        *Note: Due to a large number of results and/or encounters for the requested time period, some results have not been displayed. A complete set of results can be found in Results Review.

## 2020-11-13 NOTE — UTILIZATION REVIEW
"  Admission Status; Secondary Review Determination         Under the authority of the Utilization Management Committee, the utilization review process indicated a secondary review on the above patient.  The review outcome is based on review of the medical records, discussions with staff, and applying clinical experience noted on the date of the review.        (X)      Inpatient Status Appropriate - This patient's medical care is consistent with medical management for inpatient care and reasonable inpatient medical practice.      () Observation Status Appropriate - This patient does not meet hospital inpatient criteria and is placed in observation status. If this patient's primary payer is Medicare and was admitted as an inpatient, Condition Code 44 should be used and patient status changed to \"observation\".   () Admission Status NOT Appropriate - This patient's medical care is not consistent with medical management for Inpatient or Observation Status.          RATIONALE FOR DETERMINATION     65 year old male with multiple chronic medical problems including type 2 diabetes mellitus with peripheral neuropathy, morbid obesity, restless legs syndrome, bipolar type I, depression, anxiety, hypertension, dyslipidemia, h/o NSTEMI, stasis dermatitis and lymphedema bilateral legs, chronic back pain with implanted pump and continuous opioid dependence, chronic abdominal pain, obstructive sleep apnea, chronic fatigue syndrome, BPH, history of migraines who presented with CP.   He was seen the day prior to admission, he was seen for similar complaints, but he refused admission.  His troponin was trending upward, and he was placed on IV Heparin.  His blood pressure has been managed.  Cardiac echo and cardiology consults will be obtained, and troponins will be trended.  He is appropriate for Inpatient status.    The severity of illness, intensity of service provided, expected LOS and risk for adverse outcome make the care complex, " high risk and appropriate for hospital admission.        The information on this document is developed by the utilization review team in order for the business office to ensure compliance.  This only denotes the appropriateness of proper admission status and does not reflect the quality of care rendered.         The definitions of Inpatient Status and Observation Status used in making the determination above are those provided in the CMS Coverage Manual, Chapter 1 and Chapter 6, section 70.4.      Sincerely,     Sherwin Saldana MD  Physician Advisor  Utilization Review/ Case Management  [facility].

## 2020-11-13 NOTE — PHARMACY
Prescriber Notification Note    The pharmacist has communicated with this patient's provider regarding a concern or therapy recommendation.    Notified Person: [facility]  Date/Time of Notification: 11/12/2020 at 2100  Interaction: phone  Concern/Recommendation:  New order to begin Heparin infusion while patient's home intrathecal pain pump is in place.  Spoke w/ on-call RN at [facility] who stated that it is okay to proceed w/ Heparin infusion while patient has his home intrathecal pain pump in place.    April Cheema, PharmD, BCPS

## 2020-11-13 NOTE — PROGRESS NOTES
RECEIVING UNIT ED HANDOFF REVIEW    ED Nurse Handoff Report was reviewed by: Wally Vásquez RN on November 13, 2020 at 1:35 AM

## 2020-11-13 NOTE — PHARMACY-ADMISSION MEDICATION HISTORY
Pharmacy Medication History  Admission medication history interview status for the 11/12/2020  admission is complete. See EPIC admission navigator for prior to admission medications       Medication history sources: Patient  Location of interview: Phone  Medication history source reliability: Good  Adherence assessment: Good    Significant changes made to the medication list:  none      Additional medication history information:   Pain pump last filled Sept, 2020. Due Dec 1, 2020    Medication reconciliation completed by provider prior to medication history? No    Time spent in this activity: 15 minutes      Prior to Admission medications    Medication Sig Last Dose Taking? Auth Provider   acetaminophen (TYLENOL) 325 MG tablet Take 2 tablets (650 mg) by mouth every 4 hours as needed for mild pain  at prn Yes Raphael Silvestre,    alpha-lipoic acid 100 MG capsule Take 200 mg by mouth daily 11/12/2020 at am Yes Reported, Patient   ALPRAZolam (XANAX) 0.5 MG tablet Take 0.5 mg by mouth At Bedtime 11/11/2020 at pm Yes Unknown, Entered By History   aspirin (ASPIRIN LOW DOSE) 81 MG tablet Take 1 tablet (81 mg) by mouth daily 11/12/2020 at am Yes Vince Henry MD   atorvastatin (LIPITOR) 20 MG tablet Take 1 tablet (20 mg) by mouth daily 11/12/2020 at am Yes Bernie Nazario MD   caffeine (NO-DOZE) 200 MG TABS tablet Take 400 mg by mouth daily 11/12/2020 at am Yes Reported, Patient   cariprazine (VRAYLAR) 3 MG CAPS capsule Take 3 mg by mouth daily  11/12/2020 at am Yes Reported, Patient   divalproex sodium delayed-release (DEPAKOTE) 500 MG DR tablet Take 750 mg by mouth At Bedtime  11/11/2020 at pm Yes Maribeth Ardon MD   furosemide (LASIX) 20 MG tablet Take 1 tablet (20 mg) by mouth daily 11/12/2020 at am Yes Vince Henry MD   glimepiride (AMARYL) 1 MG tablet Take 1 tablet (1 mg) by mouth every morning (before breakfast) 11/12/2020 at am Yes Vince Henry MD   lisinopril (ZESTRIL)  10 MG tablet Take 1.5 tablets (15 mg) by mouth daily 11/12/2020 at am Yes Vince Henry MD   metFORMIN (GLUCOPHAGE-XR) 500 MG 24 hr tablet Take 1000mg in AM and 1000mg in PM 11/12/2020 at am Yes Bernie Nazario MD   metoprolol succinate ER (TOPROL-XL) 50 MG 24 hr tablet TAKE 1 TABLET(50 MG) BY MOUTH DAILY 11/11/2020 at pm Yes Maribeth Ardon MD   modafinil (PROVIGIL) 200 MG tablet Take 300 mg by mouth daily  11/12/2020 at am Yes Reported, Patient   multivitamin w/minerals (THERA-VIT-M) tablet Take 1 tablet by mouth daily 11/12/2020 at am Yes Unknown, Entered By History   NONFORMULARY by Intrathecal route continuous - + up to 4 boluses daily (100mcg each bolus usually once or 2x per day)    Managed by [facility]     Medications in Pump:  fentanyl 2000mcg/mL  Bupivacaine 20mg/mL  Morphine 5.8mg/mL     Rate:   Fentanyl 1200mcg/day  Bupivacaine 12mg/day  Morphine 4.2mg/day  Pump Last Fill Date:  09/2020  at due Dec 1 Yes Unknown, Entered By History   omega-3 acid ethyl esters (LOVAZA) 1 g capsule Take 1 capsule (1 g) by mouth daily 11/12/2020 at am Yes Bernie Nazario MD   pregabalin (LYRICA) 100 MG capsule Take 1 capsule (100 mg) by mouth 4 times daily 11/12/2020 at x1 Yes Vince Henry MD   senna-docusate (SENOKOT-S/PERICOLACE) 8.6-50 MG tablet Take 1 tablet by mouth daily Take 1 tablet in the morning and 2 tablets in the evening.  11/12/2020 at am Yes Reported, Patient   sitagliptin (JANUVIA) 100 MG tablet Take 1 tablet (100 mg) by mouth daily 11/12/2020 at am Yes Vince Henry MD   SUMAtriptan (IMITREX) 50 MG tablet Take 1 tablet (50 mg) by mouth at onset of headache for migraine May repeat in 2 hours. Max 4 tablets/24 hours.  at prn Yes Maribeth Ardon MD   tamsulosin (FLOMAX) 0.4 MG capsule Take 1 capsule (0.4 mg) by mouth 2 times daily 11/12/2020 at am Yes Vince Henry MD   Turmeric 500 MG CAPS Take 1,200 mg by mouth daily  11/12/2020  at am Yes Reported, Patient   vitamin C (ASCORBIC ACID) 1000 MG TABS Take 1,000 mg by mouth daily 11/12/2020 at am Yes Reported, Patient     Angella Haile, PharmD  Inpatient Clinical Pharmacist  [phone]

## 2020-11-13 NOTE — PROVIDER NOTIFICATION
Dr. Araya web-paged regarding pt's Echo results being finalized and if pt is ok to discharge home.

## 2020-11-13 NOTE — PROGRESS NOTES
Shift: 11/13/2020 [id number]  VS: VSS on RA    Neuro: A&O x 4  Mobility: indpendent  Pain/Nausea/Vomiting: denies pain and nausea  Diet: NPO this morning but MD ordered regular diet.   LDAs: PIV in R hand running heparin at 1,500 units/hr.  Skin: Visible skin looks good. Pt denied removing pants and socks. Reports baseline neuropathy in both feet.   GI/: continent and independent  Tests/Procedures: Had cardiology consult this morning.  Labs: Heparin within therapeutic range. Monitoring Troponin q6h, next recheck at 1800.   Education: Educated on medications before administration.  Plan: Will continue to monitor and notify of any changes.

## 2020-11-14 ENCOUNTER — HOSPITAL ENCOUNTER (EMERGENCY)
Facility: CLINIC | Age: 65
Discharge: HOME | End: 2020-11-14
Attending: EMERGENCY MEDICINE | Admitting: EMERGENCY MEDICINE
Payer: COMMERCIAL

## 2020-11-14 VITALS
SYSTOLIC BLOOD PRESSURE: 167 MMHG | TEMPERATURE: 98.1 F | WEIGHT: 260 LBS | HEIGHT: 67 IN | RESPIRATION RATE: 16 BRPM | OXYGEN SATURATION: 93 % | BODY MASS INDEX: 40.81 KG/M2 | HEART RATE: 86 BPM | DIASTOLIC BLOOD PRESSURE: 97 MMHG

## 2020-11-14 DIAGNOSIS — I10 HYPERTENSION, UNSPECIFIED TYPE: ICD-10-CM

## 2020-11-14 PROCEDURE — 250N000013 HC RX MED GY IP 250 OP 250 PS 637: Performed by: EMERGENCY MEDICINE

## 2020-11-14 PROCEDURE — 93005 ELECTROCARDIOGRAM TRACING: CPT

## 2020-11-14 PROCEDURE — 99283 EMERGENCY DEPT VISIT LOW MDM: CPT

## 2020-11-14 RX ORDER — ACETAMINOPHEN 500 MG
1000 TABLET ORAL ONCE
Status: COMPLETED | OUTPATIENT
Start: 2020-11-14 | End: 2020-11-14

## 2020-11-14 RX ADMIN — ACETAMINOPHEN 1000 MG: 500 TABLET, FILM COATED ORAL at 11:50

## 2020-11-14 ASSESSMENT — MIFFLIN-ST. JEOR: SCORE: 1927.98

## 2020-11-14 ASSESSMENT — ENCOUNTER SYMPTOMS
WEAKNESS: 1
DIZZINESS: 1

## 2020-11-14 NOTE — ED NOTES
Pt agrees that he will now call a cab after he goes to the bathroom. Pt expressed that he feels much better after speaking with MD.

## 2020-11-14 NOTE — ED AVS SNAPSHOT
[facility] Emergency Dept  [address] [address]  Phone: [phone]  Fax: [phone]                                    Parmjit Hernández   MRN: [id number]    Department: [facility] Emergency Dept   Date of Visit: 11/14/2020           After Visit Summary Signature Page    I have received my discharge instructions, and my questions have been answered. I have discussed any challenges I see with this plan with the nurse or doctor.    ..........................................................................................................................................  Patient/Patient Representative Signature      ..........................................................................................................................................  Patient Representative Print Name and Relationship to Patient    ..................................................               ................................................  Date                                   Time    ..........................................................................................................................................  Reviewed by Signature/Title    ...................................................              ..............................................  Date                                               Time          22EPIC Rev 08/18

## 2020-11-14 NOTE — PLAN OF CARE
Went over discharge paperwork with patient. Medications from pharmacy given. Education given, questions answered. VSS. Discharged home with EMS ride.

## 2020-11-14 NOTE — ED PROVIDER NOTES
History     Chief Complaint:  Hypertension       HPI  Parmjit Hernández is a 65 year old male with a history of NSTEMI, Diabetes mellitus type 2, anxiety, renal failure, hypertension, and OCD who presents for evaluation of hypertension. Per chart review, he had a normal angiogram in 2018 as well as cardiac stress MRI in August of 2020. Per chart review th patient was seen here on 11/11 for hypertension and was offered admission, however he declined. He presented the next day and was admitted for NSTEMI.    Today, the patient states that he began to feel chest pain and took his pressure, finding it to be 218/118. He states that there was also blurred vision, left arm weakness, and dizziness with his chest pain this morning. He did take his Toprol, Zestril, and Lipitor at 0830, and he states that he may have no waited long enough. He cites his anxiety being an exacerbating factor to him checking his blood pressure. His general physician informed him that if he finds a reading above 180/110 to present to the ED. He adds some baseline constipation, as he has a chronic pain pump related to abdominal pain.    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   Neurological: Positive for dizziness and weakness (left arm).   All other systems reviewed and are negative.      Physical Exam     Patient Vitals for the past 24 hrs:   BP Temp Temp src Pulse Resp SpO2 Height Weight   11/14/20 1247 (!) 167/97 -- -- 86 16 -- -- --   11/14/20 1200 (!) 146/92 -- -- 90 16 93 % -- --   11/14/20 1158 (!) 154/95 -- -- 93 13 94 % -- --   11/14/20 1115 (!) 155/80 -- -- -- 16 -- -- --   11/14/20 1100 (!) 160/88 98.1  F (36.7  C) Oral 97 16 94 % 1.702 m (5' 7\") 117.9 kg (260 lb)        Physical Exam  Constitutional: Heavy set white male sitting. No respiratory distress.  HENT: No signs of trauma.   Eyes: EOM are normal. Pupils are equal, round, and reactive to light.   Neck: Normal range of motion. No JVD present. No cervical adenopathy.  Cardiovascular: Regular rhythm.  Exam reveals no gallop and no friction rub.    No murmur heard. 2+ radial pulses bilaterally.   Pulmonary/Chest: Bilateral breath sounds normal. No wheezes, rhonchi or rales.  Abdominal: Soft. No tenderness. No rebound or guarding.   Musculoskeletal: No edema. No tenderness. Pain pump present over right pelvis posteriorly.   Lymphadenopathy: No lymphadenopathy.   Neurological: Alert and oriented to person, place, and time. Normal strength. Coordination normal.   Skin: Skin is warm and dry. No rash noted. No erythema.      Emergency Department Course     ECG:  ECG taken at 1106  Sinus rhythm with 1st degree AV block   Left axis deviation  Right bundle branch block   Inferior infarct, age undetermined   Anterolateral infarct, age undetermined   No changes compared to 11/12/2020  Rate 91 bpm. WI interval 214 ms. QRS duration 122 ms. QT/QTc 364/447 ms. P-R-T axes 38 -54 0.     Interventions:   1150 Tylenol 1000 mg PO    Emergency Department Course:    1056 Nursing notes and vitals reviewed.   I performed an exam of the patient as documented " above.     1106 EKG obtained as noted above.     1226 Findings and plan explained to the Patient. Patient discharged home with instructions regarding supportive care, medications, and reasons to return. The importance of close follow-up was reviewed.     Impression & Plan     Medical Decision Making:  Parmjit Hernández is a 65 year old male who comes to the ED by ambulance due to hypertension . The patient has had several visits within the last few days and was in fact admitted when he was fond to have a bump in his troponin. He was seen by cardiology who review his normal angiogram from [facility] in 2018 and his normal cardiac MRI stress, and felt that bumps in troponin for him were due to demeaned and there was no obstructing coronary lesions. The patient did have a bump on his last visit, but it did come back to normal. At this point he was worried at home because both his systolic and diastolic were elevated at home and he was told if it should be 180/110 or greater he should come to the ER, which he did. He admits some variable chest pain, which comes and goes, but his EKG is completley unchanged. In fact his pressures were running approximally 150/90 in both arms. I talked to the patient about his previous cardiac evaluation and that he did not have any obstructing lesions and that the bump in his troponin was likely due to his hypertension. We decided to hold off on lab work at this time. He was watched for several hours in the department and his pressures remained stable. His discomfort resolved. We discussed what to do if his pressure should remain elevated. One thing he will due is take his night time Xanax earlier if this is an evening problem. I have also talked to him about increasing his Metoprolol if he has consistently elevated pressures. His pulse is in the 80s. He is on 50 of Toprol XL and I think he could easily go to 50mg 2x daily. His Zestril is at 20 and Maxzide at 25. The patient will follow  up with his primary and contact them within two days. If symptoms worsen return to the ED.       Diagnosis:     ICD-10-CM    1. Hypertension, unspecified type  I10         Disposition:  Discharged to home.    Scribe Disclosure:  I, Jc Parada, am serving as a scribe at 10:58 AM on 11/14/2020 to document services personally performed by Norbert Schroeder MD based on my observations and the provider's statements to me.     Norbert Bhatti MD  11/14/20 2491

## 2020-11-14 NOTE — ED NOTES
"Pt c/o of additional CP upon return from bathroom. Writer obtained another set of vitals, and RN aware. Writer also provided pt with cab number per his request, but he now states he will \"frankly not be going until you find out why I have chest pain\". RN/MD aware.  "

## 2020-11-14 NOTE — ED TRIAGE NOTES
"Patient seen in ED last night for elevated BP. Continue to be elevated this AM. C/O intermittent chest pain \"that moves around\"  starting this AM  "

## 2020-11-14 NOTE — ED NOTES
Bed: ED19  Expected date: 11/14/20  Expected time: 10:28 AM  Means of arrival:   Comments:  728 65m HTN  ETA 1040

## 2020-11-15 ENCOUNTER — HOSPITAL ENCOUNTER (EMERGENCY)
Facility: CLINIC | Age: 65
Discharge: HOME | End: 2020-11-16
Attending: EMERGENCY MEDICINE | Admitting: EMERGENCY MEDICINE
Payer: COMMERCIAL

## 2020-11-15 DIAGNOSIS — R42 DIZZINESS: ICD-10-CM

## 2020-11-15 DIAGNOSIS — R29.898 LEFT ARM WEAKNESS: ICD-10-CM

## 2020-11-15 DIAGNOSIS — R29.898 BILATERAL LEG WEAKNESS: ICD-10-CM

## 2020-11-15 DIAGNOSIS — R06.00 DYSPNEA, UNSPECIFIED TYPE: ICD-10-CM

## 2020-11-15 DIAGNOSIS — R07.9 CHEST PAIN, UNSPECIFIED TYPE: ICD-10-CM

## 2020-11-15 LAB
ALBUMIN SERPL-MCNC: 3.9 G/DL (ref 3.4–5)
ALP SERPL-CCNC: 90 U/L (ref 40–150)
ALT SERPL W P-5'-P-CCNC: 57 U/L (ref 0–70)
ANION GAP SERPL CALCULATED.3IONS-SCNC: 6 MMOL/L (ref 3–14)
AST SERPL W P-5'-P-CCNC: 23 U/L (ref 0–45)
BASOPHILS # BLD AUTO: 0 10E9/L (ref 0–0.2)
BASOPHILS NFR BLD AUTO: 0.4 %
BILIRUB SERPL-MCNC: 0.4 MG/DL (ref 0.2–1.3)
BUN SERPL-MCNC: 27 MG/DL (ref 7–30)
CALCIUM SERPL-MCNC: 8.9 MG/DL (ref 8.5–10.1)
CHLORIDE SERPL-SCNC: 103 MMOL/L (ref 94–109)
CO2 SERPL-SCNC: 25 MMOL/L (ref 20–32)
CREAT SERPL-MCNC: 1.06 MG/DL (ref 0.66–1.25)
DIFFERENTIAL METHOD BLD: ABNORMAL
EOSINOPHIL # BLD AUTO: 0.2 10E9/L (ref 0–0.7)
EOSINOPHIL NFR BLD AUTO: 3.4 %
ERYTHROCYTE [DISTWIDTH] IN BLOOD BY AUTOMATED COUNT: 13.9 % (ref 10–15)
GFR SERPL CREATININE-BSD FRML MDRD: 74 ML/MIN/{1.73_M2}
GLUCOSE SERPL-MCNC: 120 MG/DL (ref 70–99)
HCT VFR BLD AUTO: 38.7 % (ref 40–53)
HGB BLD-MCNC: 13.4 G/DL (ref 13.3–17.7)
IMM GRANULOCYTES # BLD: 0 10E9/L (ref 0–0.4)
IMM GRANULOCYTES NFR BLD: 0.3 %
INTERPRETATION ECG - MUSE: NORMAL
INTERPRETATION ECG - MUSE: NORMAL
LYMPHOCYTES # BLD AUTO: 1.2 10E9/L (ref 0.8–5.3)
LYMPHOCYTES NFR BLD AUTO: 17.5 %
MCH RBC QN AUTO: 32.3 PG (ref 26.5–33)
MCHC RBC AUTO-ENTMCNC: 34.6 G/DL (ref 31.5–36.5)
MCV RBC AUTO: 93 FL (ref 78–100)
MONOCYTES # BLD AUTO: 0.6 10E9/L (ref 0–1.3)
MONOCYTES NFR BLD AUTO: 8.6 %
NEUTROPHILS # BLD AUTO: 4.8 10E9/L (ref 1.6–8.3)
NEUTROPHILS NFR BLD AUTO: 69.8 %
NRBC # BLD AUTO: 0 10*3/UL
NRBC BLD AUTO-RTO: 0 /100
PLATELET # BLD AUTO: 167 10E9/L (ref 150–450)
POTASSIUM SERPL-SCNC: 4.2 MMOL/L (ref 3.4–5.3)
PROT SERPL-MCNC: 6.9 G/DL (ref 6.8–8.8)
RBC # BLD AUTO: 4.15 10E12/L (ref 4.4–5.9)
SODIUM SERPL-SCNC: 134 MMOL/L (ref 133–144)
TROPONIN I SERPL-MCNC: 0.03 UG/L (ref 0–0.04)
WBC # BLD AUTO: 6.9 10E9/L (ref 4–11)

## 2020-11-15 PROCEDURE — 85025 COMPLETE CBC W/AUTO DIFF WBC: CPT | Performed by: EMERGENCY MEDICINE

## 2020-11-15 PROCEDURE — 80053 COMPREHEN METABOLIC PANEL: CPT | Performed by: EMERGENCY MEDICINE

## 2020-11-15 PROCEDURE — 93005 ELECTROCARDIOGRAM TRACING: CPT

## 2020-11-15 PROCEDURE — 99285 EMERGENCY DEPT VISIT HI MDM: CPT | Mod: 25

## 2020-11-15 PROCEDURE — 84484 ASSAY OF TROPONIN QUANT: CPT | Performed by: EMERGENCY MEDICINE

## 2020-11-15 RX ORDER — DIAZEPAM 5 MG
10 TABLET ORAL ONCE
Status: COMPLETED | OUTPATIENT
Start: 2020-11-15 | End: 2020-11-16

## 2020-11-15 NOTE — ED AVS SNAPSHOT
[facility] Emergency Dept  [address] [address]  Phone: [phone]  Fax: [phone]                                    Parmjit Hernández   MRN: [id number]    Department: [facility] Emergency Dept   Date of Visit: 11/15/2020           After Visit Summary Signature Page    I have received my discharge instructions, and my questions have been answered. I have discussed any challenges I see with this plan with the nurse or doctor.    ..........................................................................................................................................  Patient/Patient Representative Signature      ..........................................................................................................................................  Patient Representative Print Name and Relationship to Patient    ..................................................               ................................................  Date                                   Time    ..........................................................................................................................................  Reviewed by Signature/Title    ...................................................              ..............................................  Date                                               Time          22EPIC Rev 08/18

## 2020-11-16 ENCOUNTER — APPOINTMENT (OUTPATIENT)
Dept: MRI IMAGING | Facility: CLINIC | Age: 65
End: 2020-11-16
Attending: EMERGENCY MEDICINE
Payer: COMMERCIAL

## 2020-11-16 ENCOUNTER — MYC MEDICAL ADVICE (OUTPATIENT)
Dept: FAMILY MEDICINE | Facility: CLINIC | Age: 65
End: 2020-11-16

## 2020-11-16 VITALS
OXYGEN SATURATION: 100 % | DIASTOLIC BLOOD PRESSURE: 65 MMHG | RESPIRATION RATE: 11 BRPM | WEIGHT: 260 LBS | HEART RATE: 59 BPM | TEMPERATURE: 96.1 F | SYSTOLIC BLOOD PRESSURE: 105 MMHG | BODY MASS INDEX: 40.72 KG/M2

## 2020-11-16 PROCEDURE — 70553 MRI BRAIN STEM W/O & W/DYE: CPT

## 2020-11-16 PROCEDURE — A9585 GADOBUTROL INJECTION: HCPCS | Performed by: EMERGENCY MEDICINE

## 2020-11-16 PROCEDURE — 255N000002 HC RX 255 OP 636: Performed by: EMERGENCY MEDICINE

## 2020-11-16 PROCEDURE — 250N000013 HC RX MED GY IP 250 OP 250 PS 637: Performed by: EMERGENCY MEDICINE

## 2020-11-16 RX ORDER — GADOBUTROL 604.72 MG/ML
12 INJECTION INTRAVENOUS ONCE
Status: COMPLETED | OUTPATIENT
Start: 2020-11-16 | End: 2020-11-16

## 2020-11-16 RX ORDER — ACETAMINOPHEN 500 MG
1000 TABLET ORAL ONCE
Status: COMPLETED | OUTPATIENT
Start: 2020-11-16 | End: 2020-11-16

## 2020-11-16 RX ADMIN — GADOBUTROL 12 ML: 604.72 INJECTION INTRAVENOUS at 03:05

## 2020-11-16 RX ADMIN — DIAZEPAM 10 MG: 5 TABLET ORAL at 00:28

## 2020-11-16 RX ADMIN — ACETAMINOPHEN 1000 MG: 500 TABLET, FILM COATED ORAL at 03:48

## 2020-11-16 ASSESSMENT — ENCOUNTER SYMPTOMS
FEVER: 0
DIZZINESS: 1
SHORTNESS OF BREATH: 1
NERVOUS/ANXIOUS: 1
NUMBNESS: 1
WEAKNESS: 1
COUGH: 0

## 2020-11-16 NOTE — ED PROVIDER NOTES
History     Chief Complaint:  Generalized Weakness, Chest Pain, and Shortness of Breath      HPI   Parmjit Hernández is a 65 year old male with a history of DM, amyloidosis, NSTEMI, and polyneuropathy who presents via EMS with weakness.  The patient reports intermittent leg weakness for the past several weeks, ever since he received his flu shot.  He was admitted 10/19 - 10/21/2020 for the leg weakness and evaluated by neurology.  Guillain-[place] syndrome was considered however his strength was intact other than plantar flexion therefore IVIG would not be indicated and LP was deferred.  He saw Dr. Miles as an outpatient in follow up who told him, if he did have Guillain-[place] it was likely very mild and he seemed to be improving clinically.  Since that time, he has continued to have bilateral leg weakness and also developed intermittent left arm and hand numbness.      He has been dealing with elevated blood pressure and intermittent chest pain, prompting his presentation to the ED on 3 occasions in the past week.  He was admitted overnight 11/12 - 11/13/2020 for NSTEMI thought to be due to hypertensive demand ischemia.  Cardiology recommended adding Maxzide to his Lisinopril and Metoprolol and titrating his medications as needed to better control his blood pressure.  Yesterday he again developed chest pain and had associated left arm weakness and dizziness.  His blood pressure was 218/118 therefore he presented to the ED.  His blood pressures in the ED were not as elevated (maximum 167/97).  His discomfort resolved with Tylenol and further workup was not pursued after thorough discussion.  Today he felt his legs have been weak and his left arm started to feel weak and numb while he was sitting, watching football.  His face started to feel numb and he felt dizzy and his breathing was more shallow which caused him to be anxious.  He notes he does tend to be anxious but his symptoms were concerning to him, prompting  him to call 911.  He is concerned about not having a definitive diagnosis for his weakness and wants to have a lumbar puncture to see if he really does have Guillain-S[place] or not.    Allergies:  Cephalexin - diarrhea  Liraglutide   Sulfa eye drops  Victoza - increased migraine headache frequency     Medications:    Maxzide   Metoprolol   Lisinopril   Furosemide   Lovaza  Januvia   Metformin   Glimepiride   Tamsulosin   Imitrex  Modafinil   Depakote  Xanax  Vraylar  Intrathecal pain pump with Fentanyl and Bupivacaine  Lyrica   Senna-docusate  Tumeric   Vitamin C    Past Medical History:    Non-ST elevation myocardial infarction   Hypertension  Hyperlipidemia   Diabetes mellitus   Polyneuropathy   Morbid obesity   Lymphedema   Amyloidosis  Restless leg syndrome  Obstructive sleep apnea   Narcotic dependence  Anxiety   Bipolar 1 disorder   Obsessive-compulsive disorder   Diverticulitis     Past Surgical History:    Bone marrow biopsy 2016, 2019  Hernia repair, umbilical 2006  Deviated septum repair 2000  Cholecystectomy 1995    Family History:    Stroke - mother  Coronary artery disease - mother, father   Hypertension - mother, father  Substance abuse - mother, father   Depression - father, son   Asthma - father   Anxiety - son   Diabetes - brother     Social History:  Presents to the ED alone  Tobacco Use: No previous or current tobacco use.   Alcohol Use: No alcohol use.  PCP: Vince Henry   Marital Status:       Review of Systems   Constitutional: Negative for fever.   Respiratory: Positive for shortness of breath. Negative for cough.    Cardiovascular: Positive for chest pain and leg swelling.   Neurological: Positive for dizziness, weakness and numbness.   Psychiatric/Behavioral: The patient is nervous/anxious.    All other systems reviewed and are negative.    Physical Exam   First Vitals:  BP: (!) 125/92  Pulse: 80  Temp: 96.1  F (35.6  C)  Resp: 16  Weight: 117.9 kg (260 lb)  SpO2: 94  %      Physical Exam  Constitutional:  Appears well-developed and well-nourished. Cooperative.   HENT:   Head:    Atraumatic.   Mouth/Throat:   Oropharynx is without erythema or exudate and mucous     membranes are moist.   Eyes:    Conjunctivae normal and EOM are normal.      Pupils are equal, round, and reactive to light.   Neck:    Normal range of motion. Neck supple.   Cardiovascular:  Normal rate, regular rhythm, normal heart sounds and radial and    dorsalis pedis pulses are 2+ and symmetric.    Pulmonary/Chest:  Effort normal and breath sounds normal.   Abdominal:   Soft. Bowel sounds are normal.      No splenomegaly or hepatomegaly. No tenderness. No rebound.   Musculoskeletal:  Normal range of motion. No edema and no tenderness.   Neurological:  Alert. Normal strength. No cranial nerve deficit. GCS 15.  Normal speech.  5 of 5 strength upper and lower extremities bilaterally, no drift, visual fields grossly intact.  Sensation to light touch intact in all 4 extremities patellar reflexes create thigh twitch bilaterally.  Gait intact.  Skin:    Skin is warm and dry.   Psychiatric:   Normal mood and affect. Seems mildly anxious    Emergency Department Course   ECG:  @ 2208  Indication: weakness  Vent. Rate 79 bpm. RI interval 188 ms. QRS duration 126 ms. QT/QTc 382/438 ms. P-R-T axis 4 -54 -8.   Normal sinus rhythm.. left axis deviation. Right bundle branch block. Inferior infarct, age undetermined. Anterior infarct, age undetermined. Abnormal ECG.  No significant change when compared to previous ECG from 11/14/2020   Read @ 2212 by Dr. Varela.      Imaging:  Brain MRI without and with contrast:  1.  Pituitary is mildly enlarged with a convex superior border. Findings may reflect pituitary hyperplasia or underlying microadenoma. Please correlate clinically. Follow-up as clinically warranted.  2.  No acute infarct.  3.  Age-related changes described above.  4.  Right posterior frontal lobe high convexity right  precentral gyrus possibly microhemorrhage or mineralization as described above. Please see above for discussion. Expanded differential for microhemorrhage includes trauma, cavernous malformation, amyloid angiopathy, hypertensive microangiopathy.    Addendum: Right posterior frontal lobe high convexity right precentral gyrus region REMOTE microhemorrhage or mineralization versus volume averaging artifact. No MRI evidence to suggest acute hemorrhage.  Report per radiology.     Radiographic findings were communicated with the patient who voiced understanding of the findings.    Laboratory:  CBC: RBC 4.15 (L), HCT 38.7 (L), otherwise WNL (WBC 6.9, HGB 13.4, )   CMP: Glucose 120 (H), otherwise WNL (Creatinine 1.06)  Troponin I: 0.025    Interventions:  (0028) Valium, 10 mg, PO  (0348) Tylenol, 1000 mg, PO      Emergency Department Course:  The patient arrived in the emergency department via [facility].   Nursing notes and vitals reviewed.  I performed an exam of the patient as documented above.     EKG was done, interpretation as above.    A peripheral IV was established.  Blood was drawn and sent for laboratory testing, results as above. The patient was placed on continuous cardiac monitoring and pulse oximetry.      (0120) I spoke with the MRI tech regarding her concerns about the patient's pain pump.      The patient was sent for a brain MRI while in the emergency department, findings above.      (7679) I spoke with Dr. Miles of neurology regarding the patient's presentation and MRI results.    (3174) I spoke with Dr. Portillo of neuroradiology regarding the patient's MRI.  He notes the right posterior frontal lobe abnormality is remote and not acute.    Findings and plan explained to the patient. Patient discharged home with instructions regarding supportive care, medications, and reasons to return. The importance of close follow-up was reviewed.     Impression & Plan      Medical Decision  Making:    Patient presents after developing intermittent stabbing brief episodes of left-sided chest pain along with shortness of breath tonight.  He also felt weakness in his left arm.  The patient has been experiencing bilateral lower extremity weakness for the last few weeks.  He has seen neurology, and there was some concern for possible Guillain-Barré.  He was worried that tonight his diaphragm was being affected, which made him presents to the ED.    Regarding the chest pain, patient's EKG looks unchanged from old.  There is nothing that suggest acute ischemia.  Troponin is checked and is normal, detectable at 0.25, but similar to recent past findings.  Conference of metabolic panel and CBC with differential look unremarkable.  Signs and symptoms do not suggest pneumonia, PE, dissection.  He has had recent work-ups for his hypertension and for chest pain which have all been unremarkable.  I do not think further imaging or testing is needed tonight.    Tonight the patient has a feeling of left arm weakness and paresthesia.  I do not detect focal weakness on neurologic exam, but he has this, and has had bilateral lower extremity weakness that has waxed and waned over the past few weeks.  This makes Guillain-Barré less likely, given no has asymmetric symptoms.  Patient requested lumbar puncture to determine for certain if there were no signs of Guillain-Barré.  However because the patient has an intrathecal pain pump, and has had past L-spine surgery, the LP will be more challenging.  His NIF is normal, and the description of his weakness does not seem consistent with a concerning progression of Guillain-Barré, LP results would not .  We have elected not to do the LP.    I did check an MRI of his brain, with concern for MS, stroke, or other lesion.  Fortunately the MRI scan does not show an acute abnormality.  He does have a mildly enlarged pituitary gland which may require future follow-up.   There was a delay getting the MRI, as the MRI tech had concern pain pump may not turn back on after being switched off for the MRI.  Fortunately the pump appears to be working appropriately following imaging.    I discussed the patient's symptoms and MRI results with Dr. Miles, who has seen the patient as his neurologist.  She would like him to follow-up in the clinic.  I discussed with the patient that he may need C-spine and thoracic spine MRIs, but that they are not needed emergently tonight.  LP could be considered at a later date, but there is no emergent indication for it tonight.    I discussed test results and needed follow-up with the patient and he voices understanding.  He is able to get up and ambulate with a steady gait.  He will call Dr. Miles's office tomorrow to discuss needed follow-up.    Diagnosis:    ICD-10-CM    1. Chest pain, unspecified type  R07.9    2. Dyspnea, unspecified type  R06.00    3. Bilateral leg weakness  R29.898    4. Left arm weakness  R29.898    5. Dizziness  R42        Disposition:  Discharged to home.       I, Avelina Hollandalicia, am serving as a scribe on 11/15/2020 at 10:22 PM to personally document services performed by Joshua Varela MD based on my observations and the provider's statements to me.     Avelina Martinez  11/15/2020   [facility] EMERGENCY DEPT       Joshua Varela MD  11/16/20 0506

## 2020-11-16 NOTE — ED NOTES
Bed: ED26  Expected date: 11/15/20  Expected time: 9:55 PM  Means of arrival: Ambulance  Comments:  [address] 65M weakness

## 2020-11-16 NOTE — ED NOTES
COVID-19 PCR test completed. Patient handout For Patients Who Have Been Tested for Covid-19 (Coronavirus) was given to the patient, which includes test result notification process.     Patient presents to ED with similar complaints as last few visits. He reports a new symptom of SOB that began today. He denies any cough. Since being in the ED he is now reporting left sided chest pain.

## 2020-11-16 NOTE — ED TRIAGE NOTES
Presents to the ED via ambulance, requested a spinal tab to rule to Guillan-[place]. Per EMS: Patient got a flu shot 3 weeks ago, 2 weeks ago he was told he may be developing Guillan-[place]. Has had bilateral arm weakness for the past two weeks. Became SOB this past week. Patient is at 91% in room air, was hypertensive in the scene (). Patient has Type II diabetes. Was last seen yesterday.

## 2020-11-17 ENCOUNTER — TELEPHONE (OUTPATIENT)
Dept: CARDIOLOGY | Facility: CLINIC | Age: 65
End: 2020-11-17

## 2020-11-17 DIAGNOSIS — I10 HTN (HYPERTENSION): Primary | ICD-10-CM

## 2020-11-17 NOTE — TELEPHONE ENCOUNTER
Patient was evaluated by cardiology while inpatient for SOB, chest pain, elevated BP and Troponin-NSTEMI. PMH: type 2 diabetes mellitus with peripheral neuropathy, morbid obesity, restless legs syndrome, bipolar type I, depression, anxiety, hypertension, dyslipidemia, h/o NSTEMI, stasis dermatitis and lymphedema bilateral legs, chronic back pain with implanted pump and continuous opioid dependence, chronic abdominal pain, obstructive sleep apnea, chronic fatigue syndrome, BPH, migraine ANGELES. Dr. Puentes consulted and likely demand ischemia secondary to elevated BP and in presence of no acute EKG or echo changes- Left ventricular systolic function is normal.The visual ejection fraction is estimated at 60-65%.There is mild concentric left ventricular hypertrophy.The right ventricle is normal in structure, function and size. Recommends continued BP control-Maxzide started and PTA Lisinopril and Lasix dosages adjusted. Pt to f/u with Dr. Montague, primary cardiologist. Order placed. Writer attempted to call patient to discuss any post hospital d/c questions he may have, review medication changes, and confirm f/u appts, but no answer. VM left to return my phone call. RN will left message with patient that he needs to schedule f/u appt with Dr. Montague as ordered-scheduling phone number provided. LYN Berry RN.

## 2020-11-18 ENCOUNTER — TRANSFERRED RECORDS (OUTPATIENT)
Dept: HEALTH INFORMATION MANAGEMENT | Facility: CLINIC | Age: 65
End: 2020-11-18

## 2020-11-18 NOTE — TELEPHONE ENCOUNTER
Patient returned call and RN reviewed with him the follow up plan. Patient verbalized understanding and is in agreement with plan. RN transferred patient to scheduling to arrange f/u with Dr. Montague team.

## 2020-11-19 ENCOUNTER — OFFICE VISIT (OUTPATIENT)
Dept: FAMILY MEDICINE | Facility: CLINIC | Age: 65
End: 2020-11-19
Payer: COMMERCIAL

## 2020-11-19 ENCOUNTER — NURSE TRIAGE (OUTPATIENT)
Dept: NURSING | Facility: CLINIC | Age: 65
End: 2020-11-19

## 2020-11-19 ENCOUNTER — OFFICE VISIT (OUTPATIENT)
Dept: PHARMACY | Facility: CLINIC | Age: 65
End: 2020-11-19
Payer: COMMERCIAL

## 2020-11-19 VITALS
RESPIRATION RATE: 16 BRPM | BODY MASS INDEX: 40.81 KG/M2 | OXYGEN SATURATION: 98 % | WEIGHT: 260 LBS | SYSTOLIC BLOOD PRESSURE: 90 MMHG | DIASTOLIC BLOOD PRESSURE: 57 MMHG | HEIGHT: 67 IN | TEMPERATURE: 97 F | HEART RATE: 69 BPM

## 2020-11-19 DIAGNOSIS — E11.69 TYPE 2 DIABETES MELLITUS WITH OTHER SPECIFIED COMPLICATION, WITHOUT LONG-TERM CURRENT USE OF INSULIN (H): Primary | ICD-10-CM

## 2020-11-19 DIAGNOSIS — Z78.9 TAKES DIETARY SUPPLEMENTS: ICD-10-CM

## 2020-11-19 DIAGNOSIS — N40.0 BENIGN PROSTATIC HYPERPLASIA, UNSPECIFIED WHETHER LOWER URINARY TRACT SYMPTOMS PRESENT: ICD-10-CM

## 2020-11-19 DIAGNOSIS — E78.5 HYPERLIPIDEMIA LDL GOAL <100: ICD-10-CM

## 2020-11-19 DIAGNOSIS — G47.33 OBSTRUCTIVE SLEEP APNEA: ICD-10-CM

## 2020-11-19 DIAGNOSIS — I10 HYPERTENSION GOAL BP (BLOOD PRESSURE) < 140/90: ICD-10-CM

## 2020-11-19 DIAGNOSIS — K59.03 DRUG-INDUCED CONSTIPATION: ICD-10-CM

## 2020-11-19 DIAGNOSIS — L40.9 PSORIASIS: ICD-10-CM

## 2020-11-19 DIAGNOSIS — I10 HYPERTENSION GOAL BP (BLOOD PRESSURE) < 140/90: Primary | ICD-10-CM

## 2020-11-19 DIAGNOSIS — E11.9 TYPE 2 DIABETES MELLITUS WITHOUT COMPLICATION, WITHOUT LONG-TERM CURRENT USE OF INSULIN (H): ICD-10-CM

## 2020-11-19 DIAGNOSIS — R60.9 EDEMA, UNSPECIFIED TYPE: ICD-10-CM

## 2020-11-19 DIAGNOSIS — R12 HEARTBURN: ICD-10-CM

## 2020-11-19 DIAGNOSIS — G61.0 GUILLAIN-BARRE SYNDROME (H): ICD-10-CM

## 2020-11-19 DIAGNOSIS — G43.919 INTRACTABLE MIGRAINE WITHOUT STATUS MIGRAINOSUS, UNSPECIFIED MIGRAINE TYPE: ICD-10-CM

## 2020-11-19 DIAGNOSIS — G63 POLYNEUROPATHY ASSOCIATED WITH UNDERLYING DISEASE (H): ICD-10-CM

## 2020-11-19 DIAGNOSIS — R53.82 CHRONIC FATIGUE: ICD-10-CM

## 2020-11-19 DIAGNOSIS — F31.9 BIPOLAR I DISORDER (H): ICD-10-CM

## 2020-11-19 LAB — HBA1C MFR BLD: 5.6 % (ref 0–5.6)

## 2020-11-19 PROCEDURE — 99605 MTMS BY PHARM NP 15 MIN: CPT | Performed by: PHARMACIST

## 2020-11-19 PROCEDURE — 83036 HEMOGLOBIN GLYCOSYLATED A1C: CPT | Performed by: FAMILY MEDICINE

## 2020-11-19 PROCEDURE — 99214 OFFICE O/P EST MOD 30 MIN: CPT | Performed by: FAMILY MEDICINE

## 2020-11-19 PROCEDURE — 99607 MTMS BY PHARM ADDL 15 MIN: CPT | Performed by: PHARMACIST

## 2020-11-19 PROCEDURE — 36415 COLL VENOUS BLD VENIPUNCTURE: CPT | Performed by: FAMILY MEDICINE

## 2020-11-19 RX ORDER — MODAFINIL 200 MG/1
TABLET ORAL
Qty: 7 TABLET | Refills: 0 | Status: SHIPPED | OUTPATIENT
Start: 2020-11-19 | End: 2020-11-29

## 2020-11-19 RX ORDER — METOPROLOL SUCCINATE 50 MG/1
50 TABLET, EXTENDED RELEASE ORAL 2 TIMES DAILY
Status: ON HOLD | COMMUNITY
End: 2020-12-04

## 2020-11-19 ASSESSMENT — MIFFLIN-ST. JEOR: SCORE: 1927.98

## 2020-11-19 NOTE — PATIENT INSTRUCTIONS
Recommendations from today's MTM visit:                                                      1. Stop glimepiride.  As long as your blood sugars stay under 180 two hours after meals, you don't need this one.  We don't want you to have severe hypoglycemia like you have had in the past.     2. Stop Lovaza    3. Minda will talk with Dr. Henry about caffeine and modafinil - both of these increase your blood pressure and can also increase anxiety.     It was great to speak with you today.  I value your experience and would be very thankful for your time with providing feedback on our clinic survey. You may receive a survey via email or text message in the next few days.     Next MTM visit: Minda will check in on you in a few weeks.     To schedule another MTM appointment, please call the clinic directly or you may call the MTM scheduling line at [phone] or toll-free at [phone].     My Clinical Pharmacist's contact information:                                                      It was a pleasure talking with you today!  Please feel free to contact me with any questions or concerns you have.      Minda Smith, Pharm.D., M.B.A., BCACP  MTM Pharmacist, [facility]  Pager: [phone]  Email: [email or web address]

## 2020-11-19 NOTE — PROGRESS NOTES
MTM ENCOUNTER  SUBJECTIVE/OBJECTIVE:                           Parmjit Hernández is a 65 year old male coming in for a transitions of care visit. He was discharged from [facility] on   for NSTEMI with HTN, he has been seen in he ED for elevated BP and possible Guillain-[place] four times in the last week (, ,  and 11/15).  Patient is seeing Dr. Henry after our visit today. His other providers include:   Neurology: Dr. Miles [facility] clinic of neurology - he will be getting further workup to determine if he has GBS in the next few weeks.   Mental Health: Dr. Jose F Rahman  Cardiology: Dr. Montague, but has follow-up scheduled with [facility] on .   Dietician: Tonya Vásquez at [facility]    Reason for visit: Hospital/ER follow-up.    Allergies/ADRs: Reviewed in chart  Tobacco: He reports that he has never smoked. He has never used smokeless tobacco.  Alcohol: not currently using  Activity: Limited due to pain and current weakness  Past Medical History: Reviewed in chart    Medication Adherence/Access: Xavier lays medications out each morning and takes them each day. Most days takes Am meds around 8am, afternoon pills around 5-7pm and evening pills around midnight.  Feels like he rarely misses a dose of medication at this point (hasn't always been the case). He has been holding his BP medications if his BP is low.   Recently found out that Lyrica is $68/90 days whichis much less than the 900 he has had to pay in the past.     Type 2 Diabetes:  Pt currently taking metformin XR 2000 mg daily, glimepiride 1mg daily and Januvia 100mg daily. Pt is not experiencing side effects. Had one episode of heartburn, see details below.   Blood sugar monitorin time(s) daily. Ranges (from patient's glucose log): 109, 80, 75, 110, 100, 95, 118, 91, 109, 88, 160, 86, 139, 93, 123 - per Erickson, most are fasting readings.   Denies any hypoglycemia in the last two weeks.   Eye exam: up to date  Foot  exam: up to date  Diet: Reports in the last two weeks he has lost 15 pounds - this is intentional, reports he is cutting back. He is working with Tonya Vásquez at [facility]. He has been doing a protein sandwich for breakfast, has made changes to his sandwich at lunch, dinner is frozen meal but supplements with frozen vegetables. Shooting for a caloric intake of 1500 cals/day vs. 3000, also trying to reduce sodium intake as well. Goal to get to 220, then 180 pounds.   Aspirin: Taking 81mg daily and denies side effects    Heartburn: Denies this as an issue today. Reports occasional stomach pain that is relieved by a bolus from his pain pump.      Hypertension/Edema: Current medications include lisinopril 20 mg daily, metoprolol succinate 50 mg twice daily and triamterene/HCTZ 37/5/25mg daily. Feels like he is urinating better and his edema is better with maxide vs. furosemide.   Believes that his BP liability could be due to GBS. As noted above, he's also holding some of his BP meds if his BP is low.   Home BP recent readings for the last few days 81/54, 88/64, 126/76, 160/93  No-doze 400mg daily and also modafinil (HTN in up to 3%) This could also be contributing to his anxiety (5-21% of pts on modafinil, incidence increased with dose).     Hyperlipidemia: Current therapy includes atorvastatin 20mg daily and Lovaza 1g daily.  Patient reports no significant myalgias or other side effects. He started Lovaza based on the recommendation of a client that works with functional foods.     CFS: Currently taking modafinil 300mg daily (recent increase a few weeks ago) and caffeine 400mg daily. He does not consume any other caffeinated products.  He struggles with constantly falling asleep which is why his modafinil was recently increased. Reports that his mental health provider, Dr. Rahman would really like to get him off of modafinil.     Mental health:  Currently taking depakote 750mg daily, alprazolam 0.5mg  daily (nightly at bedtime, occasionally during the day for anxiety and terrible pain in his back (has done this twice in the last week).), Vraylar 3mg daily. Per Erickson, Dr. Rahman is trying to decrease depakote now that he is on Vraylar (recently decreasing from 1000mg to 750mg daily). Erickson has been experiencing an increase in severe anxiety recently. He describes this as situational and is related to COVID-19, the election, and his health.     Migraine: Currently taking Depakote as noted above for prevention and Imitrex for abortive therapy. Hasn't taken imitrex in 9 months or so. No recurrence of migraines since decreasing Depakote. He reports that a massage therapist showed him some neck stretches in the past that pretty much resolved his migraine headaches.      Pain: Currently taking pregabalin 100mg three times daily (prescribed as four times daily), APAP 650mg as needed, and now has a pain pump. Today he also took ibuprofen 800mg once. He generally avoids for his kidney health, but pain was unbearable. Describes his back pain as a burning pain. He is working with [facility] pain clinic.      Supplements: Currently taking ALA (to help with neuropathy), turmeric 1200mg daily to help with pain, Vitamin C 1000mg daily for immune support, and a daily MVI.     Constipation: Currently taking Senna/docusate 1 tablet in the AM and 2 tablets in the PM. Regular soft bowel movements, rare diarrhea. If he has diarrhea will take immodium and then things will be good for a while.      BPH: Currently taking Flomax 0.4mg daily, which is helpful for him. As noted above, feels like he is urinating better since switching furosemide to Maxide.     BP Readings from Last 1 Encounters:   11/19/20 116/74     Pulse Readings from Last 1 Encounters:   11/19/20 77     Wt Readings from Last 1 Encounters:   11/19/20 260 lb (117.9 kg)     ASSESSMENT:                            Medication Adherence: It's important to take his BP medications  consistently on a schedule to better understand his fluctuations in his BP. Skipping/delaying metoprolol can cause some rebound HTN.     Type 2 Diabetes: Last A1c was well below goal, but he would benefit from a recheck. In the interest of simplifying his regimen, avoiding hypoglycemia (for which he has been hospitalized in the past) and because his current BG are at goal and his decreased calorie intake, he likely doesn't need glimepiride.     Heartburn: Stable    Hypertension/Edema: Caffeine tablets and modafinil (HTN in up to 3%) are likely contributing to his labile BP as well as his anxiety (5-21% of pts on modafinil, incidence increased with dose). Anxiety is also likely contributing to his labile BP. He may also be seeing some fluctuations in BP if he is not taking his BP medications on a consistent schedule.     Hyperlipidemia: CV benefit from Lovaza occurs at a dose of 4g/day. The REDUCE-IT trial showed a risk reduction in CV events for patients with established ASCVD or diabetes with an additional risk factor on a satin with residual hypertriglyceridemia of 4.8%.  Erickson's TG are well controlled.  Discussed option with Erickson - either increase dose to 4g/day or stop Lovaza.  He prefers to simplify his regimen.   Of note, it appears there were concerns about elevated troponins - so he should likely be on a high-intensity statin (atorvastatin 40mg or higher).     CFS: As noted above, both of his treatments can be increasing his anxiety as well as his HTN. Lyrica dose can be contributing to his fatigue as well, but at this point decreases in this medication are not possible.     Mental health:  Plan in place with Dr. Rahman.     Migraine: Stable    Pain: Plan in place to follow-up with his pain providers.     Supplements: Stable    Constipation: Stable    BPH: Stable    PLAN:                            1. Stop glimepiride.  As long as your blood sugars stay under 180 two hours after meals, you don't need this  one.  We don't want you to have severe hypoglycemia like you have had in the past.     2. Stop Lovaza    3. Try to take your blood pressure medicines at the same time each day.  Dr. Henry will talk to you about the doses of the BP medications.     4. Discussed decreasing the dose of modafinil and caffeine with Dr. Henry - she will follow-up at her visit.     5. A1c today.     I spent 45 minutes with this patient today. All changes were made via collaborative practice agreement with Dr. Henry. A copy of the visit note was provided to the patient's primary care provider.    Will follow up in 2 weeks by phone.    The patient was given a summary of these recommendations.     Mnida Smith, GabinoD, TOSHIA, BCACP  MTM Pharmacist, [facility]

## 2020-11-19 NOTE — TELEPHONE ENCOUNTER
Erickson is calling and states that he has low blood pressure since getting a flu vaccine.  Guilkirk barre  is being tested in the upcoming future.  Blood pressure today is 79/55 about 7: 15am.  Yesterday at same time 88/64 and later in the day went up.  Denies dizziness currently.  Erickson states that he has been to ED four times in the last week.  High blood pressure and numbness in face.  Erickson states that he has a an appointment at 9:20 am with MD Henry.  Erickson is drinking gatorade and water.  Erickson states that if he starts to feel faint or dizzy he  will go to ED, but at this point is going to keep his appointment as he feels low pressure is due to Guillain [place].  FNA sent message to MD Henry.      COVID 19 Nurse Triage Plan/Patient Instructions    Please be aware that novel coronavirus (COVID-19) may be circulating in the community. If you develop symptoms such as fever, cough, or SOB or if you have concerns about the presence of another infection including coronavirus (COVID-19), please contact your health care provider or visit [email or web address].     Disposition/Instructions    ED Visit recommended. Follow protocol based instructions.     Bring Your Own Device:  Please also bring your smart device(s) (smart phones, tablets, laptops) and their charging cables for your personal use and to communicate with your care team during your visit.    Thank you for taking steps to prevent the spread of this virus.  o Limit your contact with others.  o Wear a simple mask to cover your cough.  o Wash your hands well and often.    Resources    [facility]: About COVID-19: [email or web address]    CDC: What to Do If You're Sick: www.cdc.gov/coronavirus/2019-ncov/about/steps-when-sick.html    CDC: Ending Home Isolation: www.cdc.gov/coronavirus/2019-ncov/hcp/disposition-in-home-patients.html     CDC: Caring for Someone: www.cdc.gov/coronavirus/2019-ncov/if-you-are-sick/care-for-someone.html     [facility]: Interim  "Guidance for Hospital Discharge to Home: www.health.[facility].[place]/diseases/coronavirus/hcp/hospdischarge.pdf    [facility] clinical trials (COVID-19 research studies): clinicalaffairs.[facility]/umn-clinical-trials     Below are the COVID-19 hotlines at the [place] Department of Health ([facility]). Interpreters are available.   o For health questions: Call 429-198-8365 or 1-491.563.6210 (7 a.m. to 7 p.m.)  o For questions about schools and childcare: Call 529-984-7767 or 1-326.890.6707 (7 a.m. to 7 p.m.)                       Reason for Disposition    Systolic BP < 80 and NOT dizzy, lightheaded or weak (feels normal)    Additional Information    Negative: Systolic BP < 90 and feeling dizzy, lightheaded, or weak    Negative: Started suddenly after an allergic medicine, an allergic food, or bee sting    Negative: Shock suspected (e.g., cold/pale/clammy skin, too weak to stand, low BP, rapid pulse)    Negative: Difficult to awaken or acting confused  (e.g., disoriented, slurred speech)    Negative: Fainted    Negative: Chest pain    Negative: Bleeding (e.g., vomiting blood, rectal bleeding or tarry stools, severe vaginal bleeding)    Negative: Extra heart beats or heart is beating fast  (i.e., \"palpitations\")    Negative: Sounds like a life-threatening emergency to the triager    Protocols used: LOW BLOOD PRESSURE-A-[place]      "

## 2020-11-19 NOTE — PROGRESS NOTES
"Subjective     Parmjit Hernández is a 65 year old male who presents to clinic today for the following health issues:    HPI         ED/UC Followup:    Facility:  [facility] ER   Date of visit: 11/15/2020  Reason for visit: Weakness. Chest pain and SOB  Current Status: PT states he mostly feels fine but some lightheaded      [place] dizzy and went to the ER. His blood pressure was elevated.   This morning his BP was low. He drank a glass of Gatorade and water.   No medications since this morning.   Currently taking metoprolol 50mg TWICE DAILY.  lisinopril 15mg once daily   [id number] started on 11/17/20 during an ER visit  Stopped furosemide.  Currently he denies any symptoms. His blood pressure improved with IV fluids.     Also, the patient was seen at the [facility] of neurology  for persistent symptoms (Alo Hugo), EMG, MRI brain and a lumbar puncture were ordered.     Also, he has a long history of plaque psoriasis. Patient has a dermatology appointment today to check on his wound in his leg and plaque Psoriasis.   [facility] upper endoscopy for persistent epigastric pain and concerns about gastritis.     Oncology with [facility]- Virtual visit    Seen by [facility] pain clinic - he was recommend to have radiofrequency neurotomy/Nerve ablasion but he decided to have another opinion with [facility].     Situational anxiety:   He states he is afraid of multiple things, getting Covid, politics and Fear about suffering or dying.     New Patient/Transfer of Care    Review of Systems   Constitutional, HEENT, cardiovascular, pulmonary, gi and gu systems are negative, except as otherwise noted.      Objective    BP 90/57   Pulse 69   Temp 97  F (36.1  C) (Tympanic)   Resp 16   Ht 1.702 m (5' 7\")   Wt 117.9 kg (260 lb)   SpO2 98%   BMI 40.72 kg/m    Body mass index is 40.72 kg/m .  Physical Exam   GENERAL: healthy, alert and no distress  NECK: no adenopathy, no asymmetry, masses, or scars and thyroid normal to " palpation  RESP: lungs clear to auscultation - no rales, rhonchi or wheezes  CV: regular rate and rhythm, normal S1 S2, no S3 or S4, no murmur, click or rub, no peripheral edema and peripheral pulses strong  MS: no gross musculoskeletal defects noted, no edema    Results for orders placed or performed in visit on 11/19/20   Hemoglobin A1c     Status: None   Result Value Ref Range    Hemoglobin A1C 5.6 0 - 5.6 %         Assessment & Plan     1. Hypertension goal BP (blood pressure) < 140/90  Assessment: the patient has a fluctuating blood pressure. He presented to the clinic previously with elevated blood pressures then presented to the ER a few days later with an elevated BP.  In the emergency room, he was started on Dyazide and increased his dose of lisinopril to 20 mg once daily.  Plan:  -Blood pressure was normal today.  Advised to continue with current medications for blood pressure.    2. Obstructive sleep apnea  Medication was not sent today.  He was advised to reduce the dose to 100 mg.  It is likely that the modafinil is causing fluctuations in his blood pressures.  - modafinil (PROVIGIL) 200 MG tablet; Take 1 tablet (200 mg) by mouth daily for 3 days, THEN 0.5 tablets (100 mg) daily for 7 days.  Dispense: 7 tablet; Refill: 0    3. 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  The patient has regular follow-up with neurology.  He was recently advised to proceed with lumbar puncture and an MRI of the spine.    5. Psoriasis  The patient has regular follow-up with dermatology.         Return in about 2 weeks (around 12/3/2020) for Follow-up visit.    Vince Henry MD  [facility]

## 2020-11-20 DIAGNOSIS — Z11.59 ENCOUNTER FOR SCREENING FOR OTHER VIRAL DISEASES: Primary | ICD-10-CM

## 2020-11-22 ENCOUNTER — NURSE TRIAGE (OUTPATIENT)
Dept: NURSING | Facility: CLINIC | Age: 65
End: 2020-11-22

## 2020-11-23 NOTE — TELEPHONE ENCOUNTER
Caller is complaining of chest pain and elevated blood pressure. Caller states the chest pain is on the left upper side under armpit. Caller states the pain is sharp , comes and goes, no chest pain while on the phone during this conversation. Caller rates the pain 4/10. Blood pressure is 183/98. Caller denies any headache or change in vision. Caller has been in ED a few times within the week due to cardiac symptoms. Triage guidelines recommend to go to ED. Caller verbalized and understands directives.  COVID 19 Nurse Triage Plan/Patient Instructions    Please be aware that novel coronavirus (COVID-19) may be circulating in the community. If you develop symptoms such as fever, cough, or SOB or if you have concerns about the presence of another infection including coronavirus (COVID-19), please contact your health care provider or visit [email or web address].     Disposition/Instructions    ED Visit recommended. Follow protocol based instructions.     Bring Your Own Device:  Please also bring your smart device(s) (smart phones, tablets, laptops) and their charging cables for your personal use and to communicate with your care team during your visit.    Thank you for taking steps to prevent the spread of this virus.  o Limit your contact with others.  o Wear a simple mask to cover your cough.  o Wash your hands well and often.    Resources    [facility]: About COVID-19: [email or web address]    CDC: What to Do If You're Sick: www.cdc.gov/coronavirus/2019-ncov/about/steps-when-sick.html    CDC: Ending Home Isolation: www.cdc.gov/coronavirus/2019-ncov/hcp/disposition-in-home-patients.html     CDC: Caring for Someone: www.cdc.gov/coronavirus/2019-ncov/if-you-are-sick/care-for-someone.html     [facility]: Interim Guidance for Hospital Discharge to Home: www.health.[facility].mn.[place]/diseases/coronavirus/hcp/hospdischarge.pdf    [facility] clinical trials (COVID-19 research studies):  "clinicalaffairs.[facility]-clinical-trials     Below are the [employer]-19 hotlines at the [place] Department of Health ([facility]). Interpreters are available.   o For health questions: Call 984-188-2135 or 1-694.722.6465 (7 a.m. to 7 p.m.)  o For questions about schools and childcare: Call 083-856-3733 or 1-295.824.8609 (7 a.m. to 7 p.m.)                     Reason for Disposition    [1] Systolic BP  >= 160 OR Diastolic >= 100 AND [2] cardiac or neurologic symptoms (e.g., chest pain, difficulty breathing, unsteady gait, blurred vision)    Additional Information    Negative: Severe difficulty breathing (e.g., struggling for each breath, speaks in single words)    Negative: Difficult to awaken or acting confused (e.g., disoriented, slurred speech)    Negative: Shock suspected (e.g., cold/pale/clammy skin, too weak to stand, low BP, rapid pulse)    Negative: [1] Chest pain lasts > 5 minutes AND [2] history of heart disease  (i.e., heart attack, bypass surgery, angina, angioplasty, CHF; not just a heart murmur)    Negative: [1] Chest pain lasts > 5 minutes AND [2] described as crushing, pressure-like, or heavy    Negative: [1] Chest pain lasts > 5 minutes AND [2] age > 50    Negative: [1] Chest pain lasts > 5 minutes AND [2] age > 30 AND [3] at least one cardiac risk factor (i.e., hypertension, diabetes, obesity, smoker or strong family history of heart disease)    Negative: [1] Chest pain lasts > 5 minutes AND [2] not relieved with nitroglycerin    Negative: Passed out (i.e., lost consciousness, collapsed and was not responding)    Negative: Heart beating < 50 beats per minute OR > 140 beats per minute    Negative: Visible sweat on face or sweat dripping down face    Negative: Sounds like a life-threatening emergency to the triager    Negative: Followed a chest injury    Negative: SEVERE chest pain    Negative: [1] Intermittent  chest pain or \"angina\" AND [2] increasing in severity or frequency  (Exception: pains lasting a " "few seconds)    Negative: Pain also present in shoulder(s) or arm(s) or jaw  (Exception: pain is clearly made worse by movement)    Negative: Difficulty breathing    Negative: Dizziness or lightheadedness    Negative: Coughing up blood    Negative: Cocaine use within last 3 days    Negative: History of prior \"blood clot\" in leg or lungs (i.e., deep vein thrombosis, pulmonary embolism)    Negative: Recent illness requiring prolonged bedrest (i.e., immobilization)    Negative: Hip or leg fracture in past 2 months (e.g., had cast on leg or ankle)    Negative: Major surgery in the past month    Negative: Recent long-distance travel with prolonged time in car, bus, plane, or train (i.e., within past 2 weeks; 6 or  more hours duration)    Negative: Chest pain lasts > 5 minutes (Exceptions: chest pain occurring > 3 days ago and now asymptomatic; same as previously diagnosed heartburn and has accompanying sour taste in mouth)    Negative: Taking a deep breath makes pain worse    Negative: Patient sounds very sick or weak to the triager    Negative: [1] Chest pain lasts > 5 minutes AND [2] occurred > 3 days ago (72 hours) AND [3] NO chest pain or cardiac symptoms now    [1] Chest pain lasting <= 5 minutes AND [2] NO chest pain or cardiac symptoms now(Exceptions: pains lasting a few seconds)    Negative: Difficult to awaken or acting confused (e.g., disoriented, slurred speech)    Negative: Severe difficulty breathing (e.g., struggling for each breath, speaks in single words)    Negative: [1] Weakness of the face, arm or leg on one side of the body AND [2] new onset    Negative: [1] Numbness (i.e., loss of sensation) of the face, arm or leg on one side of the body AND [2] new onset    Negative: [1] Chest pain lasts > 5 minutes AND [2] history of heart disease  (i.e., heart attack, bypass surgery, angina, angioplasty, CHF)    Negative: [1] Chest pain AND [2] took nitrogylcerin AND [3] pain was not relieved    Negative: Sounds " like a life-threatening emergency to the triager    Negative: Symptom is main concern  (e.g., headache, chest pain)    Negative: Low blood pressure is main concern    Protocols used: HIGH BLOOD PRESSURE-A-AH, CHEST PAIN-A-AH

## 2020-11-24 ENCOUNTER — TRANSFERRED RECORDS (OUTPATIENT)
Dept: HEALTH INFORMATION MANAGEMENT | Facility: CLINIC | Age: 65
End: 2020-11-24

## 2020-11-24 ENCOUNTER — TELEPHONE (OUTPATIENT)
Dept: MEDSURG UNIT | Facility: CLINIC | Age: 65
End: 2020-11-24

## 2020-11-25 ENCOUNTER — TELEPHONE (OUTPATIENT)
Dept: FAMILY MEDICINE | Facility: CLINIC | Age: 65
End: 2020-11-25

## 2020-11-25 NOTE — TELEPHONE ENCOUNTER
Received form(s) from [facility] for Statement of Certifying Physician.  Placed form(s) in/on FS's box.  Forms need to be filled out and signed and faxed to [phone]..    Call pt to verify form was sent: No  Copy needs to be sent for scanning after completion: Yes

## 2020-11-27 ENCOUNTER — OFFICE VISIT (OUTPATIENT)
Dept: INFUSION THERAPY | Facility: CLINIC | Age: 65
End: 2020-11-27
Attending: INTERNAL MEDICINE
Payer: COMMERCIAL

## 2020-11-27 ENCOUNTER — HOSPITAL ENCOUNTER (OUTPATIENT)
Facility: CLINIC | Age: 65
Setting detail: SPECIMEN
Discharge: HOME | End: 2020-11-27
Attending: INTERNAL MEDICINE | Admitting: INTERNAL MEDICINE
Payer: COMMERCIAL

## 2020-11-27 DIAGNOSIS — Z11.59 ENCOUNTER FOR SCREENING FOR OTHER VIRAL DISEASES: ICD-10-CM

## 2020-11-27 DIAGNOSIS — R16.1 SPLEEN ENLARGED: ICD-10-CM

## 2020-11-27 DIAGNOSIS — K74.69 OTHER CIRRHOSIS OF LIVER (H): ICD-10-CM

## 2020-11-27 LAB
AFP SERPL-MCNC: <1.5 UG/L (ref 0–8)
BASOPHILS # BLD AUTO: 0 10E9/L (ref 0–0.2)
BASOPHILS NFR BLD AUTO: 0.6 %
DIFFERENTIAL METHOD BLD: ABNORMAL
EOSINOPHIL # BLD AUTO: 0.2 10E9/L (ref 0–0.7)
EOSINOPHIL NFR BLD AUTO: 3.4 %
ERYTHROCYTE [DISTWIDTH] IN BLOOD BY AUTOMATED COUNT: 14.3 % (ref 10–15)
HCT VFR BLD AUTO: 37.9 % (ref 40–53)
HGB BLD-MCNC: 12.6 G/DL (ref 13.3–17.7)
IMM GRANULOCYTES # BLD: 0 10E9/L (ref 0–0.4)
IMM GRANULOCYTES NFR BLD: 0.4 %
LYMPHOCYTES # BLD AUTO: 1.4 10E9/L (ref 0.8–5.3)
LYMPHOCYTES NFR BLD AUTO: 20.5 %
MCH RBC QN AUTO: 32.3 PG (ref 26.5–33)
MCHC RBC AUTO-ENTMCNC: 33.2 G/DL (ref 31.5–36.5)
MCV RBC AUTO: 97 FL (ref 78–100)
MONOCYTES # BLD AUTO: 0.6 10E9/L (ref 0–1.3)
MONOCYTES NFR BLD AUTO: 7.8 %
NEUTROPHILS # BLD AUTO: 4.7 10E9/L (ref 1.6–8.3)
NEUTROPHILS NFR BLD AUTO: 67.3 %
NRBC # BLD AUTO: 0 10*3/UL
NRBC BLD AUTO-RTO: 0 /100
PLATELET # BLD AUTO: 137 10E9/L (ref 150–450)
RBC # BLD AUTO: 3.9 10E12/L (ref 4.4–5.9)
SARS-COV-2 RNA SPEC QL NAA+PROBE: NORMAL
SPECIMEN SOURCE: NORMAL
WBC # BLD AUTO: 7 10E9/L (ref 4–11)

## 2020-11-27 PROCEDURE — 36415 COLL VENOUS BLD VENIPUNCTURE: CPT

## 2020-11-27 PROCEDURE — 85025 COMPLETE CBC W/AUTO DIFF WBC: CPT | Performed by: INTERNAL MEDICINE

## 2020-11-27 PROCEDURE — U0003 INFECTIOUS AGENT DETECTION BY NUCLEIC ACID (DNA OR RNA); SEVERE ACUTE RESPIRATORY SYNDROME CORONAVIRUS 2 (SARS-COV-2) (CORONAVIRUS DISEASE [COVID-19]), AMPLIFIED PROBE TECHNIQUE, MAKING USE OF HIGH THROUGHPUT TECHNOLOGIES AS DESCRIBED BY CMS-2020-01-R: HCPCS | Performed by: PHYSICIAN ASSISTANT

## 2020-11-27 PROCEDURE — 82105 ALPHA-FETOPROTEIN SERUM: CPT | Performed by: INTERNAL MEDICINE

## 2020-11-27 RX ORDER — DEXTROSE MONOHYDRATE 25 G/50ML
25-50 INJECTION, SOLUTION INTRAVENOUS
Status: CANCELLED | OUTPATIENT
Start: 2020-11-27

## 2020-11-27 RX ORDER — NICOTINE POLACRILEX 4 MG
15-30 LOZENGE BUCCAL
Status: CANCELLED | OUTPATIENT
Start: 2020-11-27

## 2020-11-27 NOTE — LETTER
11/27/2020         RE: Parmjit Hernández  [address]  [facility] [address]        Dear Colleague,    Thank you for referring your patient, Parmjit Hernández, to the [facility]. Please see a copy of my visit note below.    Medical Assistant Note:  Parmjit Hernández presents today for blood draw.    Patient seen by provider today: No.   present during visit today: Not Applicable.    Concerns: No Concerns.    Procedure:  Lab draw site: left hand, Needle type: bf, Gauge: 23.    Post Assessment:  Labs drawn without difficulty: Yes.    Discharge Plan:  Departure Mode: Ambulatory.    Face to Face Time: 5 min  .    Shaneka Vargas [facility]              Again, thank you for allowing me to participate in the care of your patient.        Sincerely,         Lab Draw

## 2020-11-28 LAB
LABORATORY COMMENT REPORT: NORMAL
SARS-COV-2 RNA SPEC QL NAA+PROBE: NEGATIVE
SPECIMEN SOURCE: NORMAL

## 2020-11-29 NOTE — PATIENT INSTRUCTIONS
Thank you for coming into the [facility] today.    Today's plan:   1. Continue with your current medications.  2. Try to stay physically active and stick to a heart healthy diet in an effort to lose weight and help decrease risk for heart issues going forward.   3. Follow up with Dr. Monatgue in about 6 months.    If you have questions or concerns, please call my nurse team at [phone].    Scheduling phone number: [phone]    It was a pleasure seeing you today!     Ti Dent, Nurse Practitioner  [facility]  November 30, 2020  ________________________________________________________

## 2020-11-29 NOTE — PROGRESS NOTES
"  Cardiology Clinic Progress Note    Service Date: November 30, 2020    PRIMARY CARDIOLOGIST: Dr. Montague      REASON FOR VISIT: Hospital follow up for hypertension     HPI:   I had the pleasure of seeing Mr. Parnell \"Erickson\" Betty in the clinic today. 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 He underwent a coronary angiogram in 2018 through the [facility] system showing normal coronary arteries, and an echocardiogram also in 2018 showing normal LV function.    The patient was admitted to [facility] because of GI issues in April 2020 but was noted to have mild troponin elevation. He was seen by his primary care physician in follow up and there was some concern about chest discomfort. An EKG and a Lexiscan stress test was ordered. He was later seen by Dr. Montague in follow up for a video visit in June 2020. The chest discomfort had resolved and was quite atypical in nature described as a rare, intermittent sharp discomfort lasting for a few seconds to a few minutes, mild in intensity. They are nonexertional, no particular shortness of breath and he specifically denied any sweating along with those episodes. This was felt to be most likely musculoskeletal in nature. The option of a cardiac MRI with stress was discussed but the patient ultimately deferred at that time since his symptoms had resolved. His EKG was reviewed and they showed sinus rhythm with inferior Q waves which appeared similar to previous EKGs in 2018 without any new changes.    Later, he was admitted in August 2020 after presenting with generalized weakness, back pain, and chest pain following a recent lumbar decompression " surgery. Troponin was again noted to be elevated and he was seen by Dr. Stoddard in a Cardiology consultation at that time. A cardiac MRI with stress was completed on 08/18/2020 showing normal LV and RV systolic functions with no evidence of inducible ischemia. Late gadolinium enhancement imaging showed patchy delayed enhancement in the basal septal wall consistent with non CAD non specific scar. No evidence of current amyloid was noted however given elevated troponin and EKG findings, it was noted that could still have early process. A yearly cardiac MRI was suggested for ongoing surveillance.    Unfortunately, he has been hospitalized several times this fall. He was admitted in late October 2020 with leg weakness and evaluated by neurology. Guillain-Le Roy syndrome was considered, however his strength was intact other than plantar flexion therefore IVIG was not felt be indicated and lumbar puncture was deferred. He saw Dr. Miles with Neurology in follow up as an outpatient and was told that if he did have Guillain-Le Roy it was likely very mild and he seemed to be improving clinically.    More recently, he was admitted on 11/12/2020 after presenting with chest pain and hypertension. He had significantly elevated blood pressure and minimally elevated troponins. EKG appeared to be at baseline. His presentation was felt to be consistent with a type II demand NSTEMI secondary to elevated blood pressures given his previous extensive cardiac workup and recent cardiac MRI with stress suggesting against CAD or ischemia. The dose of his lisinopril was increased to 20 mg a day from 15 mg, he as continued on metoprolol succinate at 50 mg a day, and triamterene-hydrochlorothiazide was added at 37.5 mg-25 mg once daily. He was discharged, but unfortunately represented to the emergency department several days later on 11/15/20 with similar chest discomfort symptoms in the setting of continued elevated blood pressure up to  218/118. EKG looked unchanged from previous on file without concern for ischemic changes and troponin was detectable but not elevated. His blood pressure improved and he was subsequently discharged home.    Today, he presents to the clinic in follow up of his recent hospital visits primarily for reassessment of his blood pressure. He tells me that he has been feeling better following his discharge from the hospital. His blood pressure has been under better control in his checks at home primarily around the 110-120/70-80 range. He has had some outliers of higher readings and a few lower readings in the 80-90 systolic. He tells me that he has held or waited to take his morning medications if he has a lower reading. He tries to take in more fluids in these instances and has rechecked his blood pressures with improvement within a couple of hours. He has only had one subsequent episode of mild atypical, sharp chest discomfort following his most recent admission. He is unsure if his blood pressure was elevated at the time. He otherwise denies significant shortness of breath or any recent worsening of his chronic lower extremity edema. He has not had palpitations, dizziness, presyncope, or syncope.    ASSESSMENT AND PLAN:  1.  Recent Non-STEMI, likely demand MI  - Normal coronary angiography in 2018 at [facility] and normal recent cardiac MRI stress study as noted above.    2.  Hypertension  - Readings have been labile at times. Currently well-controlled.   - Will continue current regimen of lisinopril 20 mg daily, metoprolol succinate 50 mg daily, and triamterene-hydrochlorothiazide which was recently added at 37.5 mg-25 mg once daily. We reviewed to call the clinic if he begins having more frequent readings under 90/60 or issues with dizziness or lightheadedness with this. Also instructed him to call if the BP begins to trend the other way with more consistent readings over 130/85 so adjustments can be made  if needed.    3.  Systemic amyloidosis   - Cardiac MRI with stress 08/18/2020 showing normal LV and RV systolic functions with no evidence of inducible ischemia. Late gadolinium enhancement imaging showed patchy delayed enhancement in the basal septal wall consistent with non CAD, non-specific scar. No evidence of current amyloid was noted, however given elevated troponin and EKG findings he could still have early process. A repeat cardiac MRI in 1-2 years could be considered for ongoing surveillance.    4.  Hyperlipidemia, treated on atorvastatin 20 mg daily    5.  Obesity    6.  Bipolar, anxiety and depression disorder    7.  Diabetes mellitus with endorgan manifestations of neuropathy and edema    8.  Polyneuropathy    9.  Restless leg syndrome    10.  Recent issues with weakness, possibly secondary to Guillain-Riverside syndrome and followed by Neurology    11.  Obstructive sleep apnea, faithful with CPAP use    Thank you for the opportunity to participate in this pleasant patient's care. He will see Dr. Montague in follow up in about 6 months. We would be happy to see him sooner if needed for any concerns in the meantime.     JUNIOR Frank, CNP   Text Page  (8am - 5pm, M-F)    Orders this Visit:  No orders of the defined types were placed in this encounter.    No orders of the defined types were placed in this encounter.    There are no discontinued medications.  Encounter Diagnoses   Name Primary?     Essential hypertension Yes     NSTEMI (non-ST elevated myocardial infarction) (H)      Hyperlipidemia LDL goal <100      Other amyloidosis (H)      Type 2 diabetes mellitus with other specified complication, unspecified whether long term insulin use (H)      Restless legs syndrome (RLS)      Anxiety and depression      Bipolar I disorder (H)      Obstructive sleep apnea      Morbid obesity (H)      HTN (hypertension)      CURRENT MEDICATIONS:  Current Outpatient Medications   Medication Sig Dispense Refill      acetaminophen (TYLENOL) 325 MG tablet Take 2 tablets (650 mg) by mouth every 4 hours as needed for mild pain       alpha-lipoic acid 100 MG capsule Take 200 mg by mouth daily       ALPRAZolam (XANAX) 0.5 MG tablet Take 0.5 mg by mouth At Bedtime       aspirin (ASPIRIN LOW DOSE) 81 MG tablet Take 1 tablet (81 mg) by mouth daily 30 tablet 3     atorvastatin (LIPITOR) 20 MG tablet Take 1 tablet (20 mg) by mouth daily 90 tablet 1     cariprazine (VRAYLAR) 3 MG CAPS capsule Take 3 mg by mouth daily        divalproex sodium delayed-release (DEPAKOTE) 500 MG DR tablet Take 750 mg by mouth At Bedtime  10 tablet 0     lisinopril (ZESTRIL) 20 MG tablet Take 1 tablet (20 mg) by mouth daily 30 tablet 1     metFORMIN (GLUCOPHAGE-XR) 500 MG 24 hr tablet Take 1000mg in AM and 1000mg in  tablet 1     metoprolol succinate ER (TOPROL-XL) 50 MG 24 hr tablet Take 50 mg by mouth 2 times daily       multivitamin w/minerals (THERA-VIT-M) tablet Take 1 tablet by mouth daily       NONFORMULARY by Intrathecal route continuous - + up to 4 boluses daily (100mcg each bolus usually once or 2x per day)    Managed by Dr Pawan Shaw, [facility]     Medications in Pump:  fentanyl 2000mcg/mL  Bupivacaine 20mg/mL  Morphine 5.8mg/mL     Rate:   Fentanyl 1200mcg/day  Bupivacaine 12mg/day  Morphine 4.2mg/day  Pump Last Fill Date:  09/2020       pregabalin (LYRICA) 100 MG capsule Take 1 capsule (100 mg) by mouth 4 times daily (Patient taking differently: Take 100 mg by mouth 3 times daily ) 360 capsule 3     senna-docusate (SENOKOT-S/PERICOLACE) 8.6-50 MG tablet Take 1 tablet by mouth daily Take 1 tablet in the morning and 2 tablets in the evening.        sitagliptin (JANUVIA) 100 MG tablet Take 1 tablet (100 mg) by mouth daily 90 tablet 0     tamsulosin (FLOMAX) 0.4 MG capsule Take 1 capsule (0.4 mg) by mouth 2 times daily 30 capsule 3     triamterene-HCTZ (MAXZIDE-25) 37.5-25 MG tablet Take 1 tablet by mouth daily 30 tablet 0     Turmeric  500 MG CAPS Take 1,200 mg by mouth daily        vitamin C (ASCORBIC ACID) 1000 MG TABS Take 1,000 mg by mouth daily       SUMAtriptan (IMITREX) 50 MG tablet Take 1 tablet (50 mg) by mouth at onset of headache for migraine May repeat in 2 hours. Max 4 tablets/24 hours. (Patient not taking: Reported on 11/30/2020) 8 tablet 1       ALLERGIES  Allergies   Allergen Reactions     Cephalexin Diarrhea     Liraglutide Other (See Comments)     Sulfa Drugs Swelling     Pt has taken  Taken sulfa drugs orally without trouble. He had problems with Sulfa eye drops. Eye swelled up     Victoza      Increased migraine frequency and severity       PAST MEDICAL, SURGICAL, FAMILY HISTORY:  History was reviewed and updated as needed, see medical record.    SOCIAL HISTORY:  Social History     Socioeconomic History     Marital status:      Spouse name: Not on file     Number of children: 3     Years of education: Not on file     Highest education level: Not on file   Occupational History     Employer: [email or web address]   Social Needs     Financial resource strain: Not very hard     Food insecurity     Worry: Never true     Inability: Never true     Transportation needs     Medical: No     Non-medical: No   Tobacco Use     Smoking status: Never Smoker     Smokeless tobacco: Never Used   Substance and Sexual Activity     Alcohol use: Not Currently     Alcohol/week: 0.0 standard drinks     Drug use: Not Currently     Types: Cocaine, Marijuana, Methamphetamines, Opiates, IV, Amphetamines, Barbiturates, Benzodiazepines, Codeine, Fentanyl, Hashish, Hydrocodone, Hydromorphone, LSD, Oxycodone     Sexual activity: Not Currently     Partners: Female   Lifestyle     Physical activity     Days per week: 1 day     Minutes per session: Not on file     Stress: Not on file   Relationships     Social connections     Talks on phone: Not on file     Gets together: Not on file     Attends Jewish service: Not on file     Active member of club or  "organization: Not on file     Attends meetings of clubs or organizations: Not on file     Relationship status: Not on file     Intimate partner violence     Fear of current or ex partner: Not on file     Emotionally abused: Not on file     Physically abused: Not on file     Forced sexual activity: Not on file   Other Topics Concern     Parent/sibling w/ CABG, MI or angioplasty before 65F 55M? No   Social History Narrative    Social Documentation:05/27/2010        Balanced Diet: NO    Calcium intake: 3-4 per day    Caffeine: 2 per day    Exercise:  type of activity NO    Sunscreen: Yes    Seatbelts:  Yes    Self Breast Exam:  No - na    Self Testicular Exam: no    Physical/Emotional/Sexual Abuse: No     Do you feel safe in your environment? Yes        Cholesterol screen up to date: Yes    Eye Exam up to date: Yes    Dental Exam up to date: Yes    Pap smear up to date: Does Not Apply    Mammogram up to date: Does Not Apply    Dexa Scan up to date:NO    Colonoscopy up to date:2007    Immunizations up to date: YES    Glucose screen if over 40: Yes        Sofi Rosas CMA                 Review of Systems:  Skin:  Negative     Eyes:  Negative    ENT:  Negative    Respiratory:  Positive for dyspnea on exertion;sleep apnea;CPAP  Cardiovascular:    chest pain;Positive for;edema;fatigue  Gastroenterology: Negative    Genitourinary:  Negative    Musculoskeletal:  Positive for back pain  Neurologic:  Negative    Psychiatric:  Negative    Heme/Lymph/Imm:  Positive for allergies  Endocrine:  Positive for diabetes     Physical Exam:  Vitals: /66   Pulse 83   Ht 1.702 m (5' 7\")   Wt 121.1 kg (267 lb)   BMI 41.82 kg/m     Wt Readings from Last 4 Encounters:   11/30/20 121.1 kg (267 lb)   11/19/20 117.9 kg (260 lb)   11/16/20 117.9 kg (260 lb)   11/14/20 117.9 kg (260 lb)     CONSTITUTIONAL: Appears his stated age, well nourished, and in no acute distress.  HEENT: Pupils equal, round. Sclerae nonicteric.    NECK: Supple, " thick neck. Difficult to assess JVP, but no obvious JVD appreciated.  C/V: Distant heart sounds. Regular rate and rhythm, normal S1 and S2, no S3 or S4, no murmur, rub or gallop.   RESP: Respirations are unlabored. Lungs are clear to auscultation bilaterally without wheezing, rales, or rhonchi.  GI: Abdomen soft, obeses, non-tender, non-distended.  EXTREM: Trace lower extremity edema bilaterally. No clubbing or cyanosis.  NEURO: Alert and oriented, cooperative. Gait steady. No gross focal deficits.   PSYCH: Affect appropriate. Mentation normal. Responds to questions appropriately.  SKIN: Warm and dry. No apparent rashes or bruising.    Recent Lab Results:  LIPID RESULTS:  Lab Results   Component Value Date    CHOL 163 02/24/2020    HDL 32 (L) 02/24/2020     (H) 02/24/2020    TRIG 121 02/24/2020    CHOLHDLRATIO 5.9 (H) 09/01/2015     LIVER ENZYME RESULTS:  Lab Results   Component Value Date    AST 23 11/15/2020    ALT 57 11/15/2020     CBC RESULTS:  Lab Results   Component Value Date    WBC 7.0 11/27/2020    RBC 3.90 (L) 11/27/2020    HGB 12.6 (L) 11/27/2020    HCT 37.9 (L) 11/27/2020    MCV 97 11/27/2020    MCH 32.3 11/27/2020    MCHC 33.2 11/27/2020    RDW 14.3 11/27/2020     (L) 11/27/2020     BMP RESULTS:  Lab Results   Component Value Date     11/15/2020    POTASSIUM 4.2 11/15/2020    CHLORIDE 103 11/15/2020    CO2 25 11/15/2020    ANIONGAP 6 11/15/2020     (H) 11/15/2020    BUN 27 11/15/2020    CR 1.06 11/15/2020    GFRESTIMATED 74 11/15/2020    GFRESTBLACK 85 11/15/2020    LUIS 8.9 11/15/2020      A1C RESULTS:  Lab Results   Component Value Date    A1C 5.6 11/19/2020     INR RESULTS:  Lab Results   Component Value Date    INR 1.18 (H) 04/17/2020    INR 1.06 01/27/2018     This note was completed in part using Dragon voice recognition software. Although reviewed after completion, some word and grammatical errors may occur.

## 2020-11-30 ENCOUNTER — HOSPITAL ENCOUNTER (OUTPATIENT)
Dept: GENERAL RADIOLOGY | Facility: CLINIC | Age: 65
DRG: 312 | End: 2020-11-30
Attending: PHYSICIAN ASSISTANT
Payer: COMMERCIAL

## 2020-11-30 ENCOUNTER — HOSPITAL ENCOUNTER (OUTPATIENT)
Facility: CLINIC | Age: 65
Discharge: HOME | DRG: 312 | End: 2020-11-30
Admitting: PHYSICIAN ASSISTANT
Payer: COMMERCIAL

## 2020-11-30 ENCOUNTER — VIRTUAL VISIT (OUTPATIENT)
Dept: ONCOLOGY | Facility: CLINIC | Age: 65
End: 2020-11-30
Attending: INTERNAL MEDICINE
Payer: COMMERCIAL

## 2020-11-30 ENCOUNTER — OFFICE VISIT (OUTPATIENT)
Dept: CARDIOLOGY | Facility: CLINIC | Age: 65
End: 2020-11-30
Payer: COMMERCIAL

## 2020-11-30 VITALS
HEART RATE: 67 BPM | OXYGEN SATURATION: 98 % | SYSTOLIC BLOOD PRESSURE: 107 MMHG | TEMPERATURE: 96.6 F | WEIGHT: 254 LBS | HEIGHT: 67 IN | DIASTOLIC BLOOD PRESSURE: 59 MMHG | RESPIRATION RATE: 18 BRPM | BODY MASS INDEX: 39.87 KG/M2

## 2020-11-30 VITALS
WEIGHT: 267 LBS | SYSTOLIC BLOOD PRESSURE: 101 MMHG | DIASTOLIC BLOOD PRESSURE: 66 MMHG | HEIGHT: 67 IN | HEART RATE: 83 BPM | BODY MASS INDEX: 41.91 KG/M2

## 2020-11-30 DIAGNOSIS — F31.9 BIPOLAR I DISORDER (H): ICD-10-CM

## 2020-11-30 DIAGNOSIS — F41.9 ANXIETY AND DEPRESSION: ICD-10-CM

## 2020-11-30 DIAGNOSIS — E85.89 OTHER AMYLOIDOSIS (H): ICD-10-CM

## 2020-11-30 DIAGNOSIS — R20.0 NUMBNESS: ICD-10-CM

## 2020-11-30 DIAGNOSIS — E85.89 OTHER AMYLOIDOSIS (H): Primary | ICD-10-CM

## 2020-11-30 DIAGNOSIS — M62.838 MUSCLE SPASM: ICD-10-CM

## 2020-11-30 DIAGNOSIS — K74.60 CIRRHOSIS OF LIVER WITHOUT ASCITES, UNSPECIFIED HEPATIC CIRRHOSIS TYPE (H): ICD-10-CM

## 2020-11-30 DIAGNOSIS — I21.4 NSTEMI (NON-ST ELEVATED MYOCARDIAL INFARCTION) (H): ICD-10-CM

## 2020-11-30 DIAGNOSIS — G93.32 CHRONIC FATIGUE SYNDROME: ICD-10-CM

## 2020-11-30 DIAGNOSIS — I10 ESSENTIAL HYPERTENSION: Primary | ICD-10-CM

## 2020-11-30 DIAGNOSIS — E78.5 HYPERLIPIDEMIA LDL GOAL <100: ICD-10-CM

## 2020-11-30 DIAGNOSIS — R16.1 SPLEEN ENLARGED: ICD-10-CM

## 2020-11-30 DIAGNOSIS — E66.01 MORBID OBESITY (H): ICD-10-CM

## 2020-11-30 DIAGNOSIS — M62.81 MUSCLE WEAKNESS: ICD-10-CM

## 2020-11-30 DIAGNOSIS — F32.A ANXIETY AND DEPRESSION: ICD-10-CM

## 2020-11-30 DIAGNOSIS — E11.69 TYPE 2 DIABETES MELLITUS WITH OTHER SPECIFIED COMPLICATION, UNSPECIFIED WHETHER LONG TERM INSULIN USE (H): ICD-10-CM

## 2020-11-30 DIAGNOSIS — G25.81 RESTLESS LEGS SYNDROME (RLS): ICD-10-CM

## 2020-11-30 DIAGNOSIS — G47.33 OBSTRUCTIVE SLEEP APNEA: ICD-10-CM

## 2020-11-30 LAB
APPEARANCE CSF: CLEAR
COLOR CSF: COLORLESS
GLUCOSE CSF-MCNC: 65 MG/DL (ref 40–70)
GLUCOSE SERPL-MCNC: 92 MG/DL (ref 70–99)
GRAM STN SPEC: NORMAL
PROT CSF-MCNC: 59 MG/DL (ref 15–60)
RBC # CSF MANUAL: 0 /UL (ref 0–2)
SPECIMEN SOURCE: NORMAL
TUBE # CSF: 4 #
WBC # CSF MANUAL: 1 /UL (ref 0–5)

## 2020-11-30 PROCEDURE — 83916 OLIGOCLONAL BANDS: CPT

## 2020-11-30 PROCEDURE — 87070 CULTURE OTHR SPECIMN AEROBIC: CPT

## 2020-11-30 PROCEDURE — 82947 ASSAY GLUCOSE BLOOD QUANT: CPT

## 2020-11-30 PROCEDURE — 89050 BODY FLUID CELL COUNT: CPT

## 2020-11-30 PROCEDURE — 82784 ASSAY IGA/IGD/IGG/IGM EACH: CPT

## 2020-11-30 PROCEDURE — 82164 ANGIOTENSIN I ENZYME TEST: CPT

## 2020-11-30 PROCEDURE — 87015 SPECIMEN INFECT AGNT CONCNTJ: CPT

## 2020-11-30 PROCEDURE — 999N001193 HC VIDEO/TELEPHONE VISIT; NO CHARGE

## 2020-11-30 PROCEDURE — 84157 ASSAY OF PROTEIN OTHER: CPT

## 2020-11-30 PROCEDURE — 99214 OFFICE O/P EST MOD 30 MIN: CPT | Performed by: NURSE PRACTITIONER

## 2020-11-30 PROCEDURE — 82042 OTHER SOURCE ALBUMIN QUAN EA: CPT

## 2020-11-30 PROCEDURE — 82945 GLUCOSE OTHER FLUID: CPT

## 2020-11-30 PROCEDURE — 82040 ASSAY OF SERUM ALBUMIN: CPT

## 2020-11-30 PROCEDURE — 86618 LYME DISEASE ANTIBODY: CPT

## 2020-11-30 PROCEDURE — 87205 SMEAR GRAM STAIN: CPT

## 2020-11-30 PROCEDURE — 62270 DX LMBR SPI PNXR: CPT

## 2020-11-30 PROCEDURE — 99214 OFFICE O/P EST MOD 30 MIN: CPT | Mod: 95 | Performed by: INTERNAL MEDICINE

## 2020-11-30 PROCEDURE — 999N000154 HC STATISTIC RADIOLOGY XRAY, US, CT, MAR, NM

## 2020-11-30 PROCEDURE — 86592 SYPHILIS TEST NON-TREP QUAL: CPT

## 2020-11-30 RX ORDER — NICOTINE POLACRILEX 4 MG
15-30 LOZENGE BUCCAL
Status: DISCONTINUED | OUTPATIENT
Start: 2020-11-30 | End: 2020-11-30 | Stop reason: HOSPADM

## 2020-11-30 RX ORDER — DEXTROSE MONOHYDRATE 25 G/50ML
25-50 INJECTION, SOLUTION INTRAVENOUS
Status: DISCONTINUED | OUTPATIENT
Start: 2020-11-30 | End: 2020-11-30 | Stop reason: HOSPADM

## 2020-11-30 RX ORDER — LIDOCAINE HYDROCHLORIDE 10 MG/ML
5 INJECTION, SOLUTION EPIDURAL; INFILTRATION; INTRACAUDAL; PERINEURAL ONCE
Status: COMPLETED | OUTPATIENT
Start: 2020-11-30 | End: 2020-11-30

## 2020-11-30 RX ADMIN — LIDOCAINE HYDROCHLORIDE 5 ML: 10 INJECTION, SOLUTION EPIDURAL; INFILTRATION; INTRACAUDAL; PERINEURAL at 13:35

## 2020-11-30 ASSESSMENT — MIFFLIN-ST. JEOR
SCORE: 1959.73
SCORE: 1900.77

## 2020-11-30 NOTE — PROGRESS NOTES
Care Suites Discharge Nursing Note    Patient Information  Name: Parmjit Hernández  Age: 65 year old    Discharge Education:  Discharge instructions reviewed: Yes  Additional education/resources provided: na  Patient/patient representative verbalizes understanding: Yes  Patient discharging on new medications: No  Medication education completed: Yes    Discharge Plans:   Discharge location: home  Discharge ride contacted: Yes  Approximate discharge time: 1510-15    Discharge Criteria:  Discharge criteria met and vital signs stable: Yes. Site CDI with bandaid. No pain. Ate and drank.    Patient Belongs:  Patient belongings returned to patient: Yes    Bernie Beryr RN

## 2020-11-30 NOTE — DISCHARGE INSTRUCTIONS
Lumbar Puncture Discharge Instructions     After you go home:      You may resume your normal diet    Continue to drink at least 8 ounces of fluid every 1-2 hours until bedtime tonight and continue to drink extra fluids for the next 2 days    Caffeinated beverages may help prevent or reduce spinal headaches    Care of Puncture Site:      If there is a bandaid - you may remove it tomorrow morning    You may shower tomorrow    No tub baths, whirlpools or swimming for 48 hours     Activity - to help prevent spinal headache or spinal fluid leakage:      Minimize your activity today. Flat bedrest for 24 hrs is strongly suggested as this will help to prevent a spinal headache.  You can be up to the bathroom and for meals.    Resume normal activities tomorrow.     Avoid strenuous activity for the next 2 days    Do not drive a vehicle until tomorrow morning    Medicines:      You may resume all medications    Resume your Platelet Inhibitors and Aspirin tomorrow at your regular dose    For minor pain, you may take Acetaminophen (Tylenol) or Ibuprofen (Advil)            Call the provider who ordered this procedure if:      Your headache becomes worse or is severe. (A minor headache is not unusual)    You have nausea or vomiting    The site is red, swollen, hot or tender    You have chills or a fever greater than 101 F (38 C)    Any questions or concerns    If you have questions call:        [facility] Radiology Dept @ [phone]    The provider who performed your procedure was

## 2020-11-30 NOTE — PROGRESS NOTES
Visit Date:   11/30/2020     HEMATOLOGY HISTORY: Mr. Hernández is a gentleman with amyloidosis, liver cirrhosis and splenic lesion.    1. Systemic AL amyloidosis treated with autologous stem cell transplant December 27, 2016 at [facility].  2. On 04/15/2020:   -Normal WBC, hemoglobin and platelets.   -Normal potassium and creatinine.   -UA does not reveal any infection.   -COVID-19 has been ruled out.   -CT abdomen and pelvis reveals liver cirrhosis and portal venous hypertension with splenomegaly.  There is hypodensity in the lateral aspect of the spleen, which could be hemangioma or splenic infarct.  Spleen is enlarged at 16 cm.  There are multiple small bilateral pelvic lymph nodes, likely reactive.      SUBJECTIVE:  Mr. Hernández is a 65-year-old gentleman with multiple medical problems including amyloidosis, diabetes mellitus, hypertension and liver cirrhosis.  In 04/2020,  he was seen in the hospital because of a splenic lesion.  It was likely hemangioma.  Followup was recommended.  He did not get his ultrasound.      The patient had a CT chest angiogram done on 08/17/2020.  No PE.  It revealed splenomegaly without any abnormal splenic lesion.      The patient has multiple ongoing problems.  He recently had NSTEMI.  He is following up with a cardiologist.      The patient has fatigue.  He has intermittent headache and dizziness.  No neck pain.  No chest pain.  He gets short of breath on minimal exertion.  No nausea or vomiting.  Appetite is fairly good.  No fever or chills.        All other review of systems negative.      PHYSICAL EXAMINATION:   GENERAL:  He is alert and oriented x 3.  He is not in any distress.   RESPIRATORY:  No cough.  No labored breathing.    The rest of a comprehensive physical examination is deferred due to public health emergency with video visit restrictions.      LABORATORY DATA:  Reviewed.      ASSESSMENT:   1.  Amyloidosis being followed at [facility].   2.  Status post  autologous stem cell transplant.   3.  Liver cirrhosis.   4.  Splenomegaly from liver cirrhosis.   5.  Mild chronic thrombocytopenia, stable.   6.  Normocytic anemia due to anemia of chronic disease and liver cirrhosis.   7.  Coronary artery disease, status post non-ST elevation myocardial infarction.      PLAN:   1.  I had a long discussion with the patient.  The patient has multiple ongoing problems.  He will continue to follow up with his cardiologist regarding his recent MI.      2.  Discussed regarding his liver cirrhosis, splenomegaly and liver lesions.  I explained to him last CT chest angiogram revealed splenomegaly. No abnormal  lesions.      I told him we need to continue to monitor him.  In 6 months' time, we will get abdominal ultrasound and also AFB.  He is at high risk of HCC.      3.  CBC was reviewed.  I explained to him that he is mildly anemic and thrombocytopenic.  We will monitor.  This is mainly because of his liver cirrhosis.      4.  For amyloidosis, he has an upcoming appointment at [facility].      5.  He had a few questions, which were all answered.  I will see him in 6 months' time.         CLAUDIA MALONE MD             D: 2020   T: 2020   MT: DONALD      Name:     JULIO PRESCOTT   MRN:      [id number]        Account:      [id number]   :      1955           Visit Date:   2020      Document: [id number]      Video time of 15 minutes.

## 2020-11-30 NOTE — LETTER
"    11/30/2020         RE: Parmjit Hernández  [address]  [facility] [address]      Parmjit Hernández is a 65 year old male who is being evaluated via a billable video visit.      The patient has been notified of following:     \"This video visit will be conducted via a call between you and your physician/provider. We have found that certain health care needs can be provided without the need for an in-person physical exam.  This service lets us provide the care you need with a video conversation.  If a prescription is necessary we can send it directly to your pharmacy.  If lab work is needed we can place an order for that and you can then stop by our lab to have the test done at a later time.    Video visits are billed at different rates depending on your insurance coverage.  Please reach out to your insurance provider with any questions.    If during the course of the call the physician/provider feels a video visit is not appropriate, you will not be charged for this service.\"    Patient has given verbal consent for Video visit? Yes  How would you like to obtain your AVS? [email or web address]     Please send a link to: [email or web address]          Video-Visit Details    Type of service:  Video Visit    Originating Location (pt. Location): Home    Distant Location (provider location):  [facility]     Platform used for Video Visit: Danny Clayton [facility]        Visit Date:   11/30/2020     HEMATOLOGY HISTORY: Mr. Hernández is a gentleman with amyloidosis, liver cirrhosis and splenic lesion.    1. 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  2. On 04/15/2020:   -Normal WBC, hemoglobin and platelets.   -Normal potassium and creatinine.   -UA does not reveal any infection.   -COVID-19 has been ruled out.   -CT abdomen and pelvis reveals liver cirrhosis and portal venous hypertension with splenomegaly.  There is hypodensity in the " lateral aspect of the spleen, which could be hemangioma or splenic infarct.  Spleen is enlarged at 16 cm.  There are multiple small bilateral pelvic lymph nodes, likely reactive.      SUBJECTIVE:  Mr. Hernández is a 65-year-old gentleman with multiple medical problems including amyloidosis, diabetes mellitus, hypertension and liver cirrhosis.  In 04/2020,  he was seen in the hospital because of a splenic lesion.  It was likely hemangioma.  Followup was recommended.  He did not get his ultrasound.      The patient had a CT chest angiogram done on 08/17/2020.  No PE.  It revealed splenomegaly without any abnormal splenic lesion.      The patient has multiple ongoing problems.  He recently had NSTEMI.  He is following up with a cardiologist.      The patient has fatigue.  He has intermittent headache and dizziness.  No neck pain.  No chest pain.  He gets short of breath on minimal exertion.  No nausea or vomiting.  Appetite is fairly good.  No fever or chills.        All other review of systems negative.      PHYSICAL EXAMINATION:   GENERAL:  He is alert and oriented x 3.  He is not in any distress.   RESPIRATORY:  No cough.  No labored breathing.    The rest of a comprehensive physical examination is deferred due to public health emergency with video visit restrictions.      LABORATORY DATA:  Reviewed.      ASSESSMENT:   1.  Amyloidosis being followed at [facility].   2.  Status post autologous stem cell transplant.   3.  Liver cirrhosis.   4.  Splenomegaly from liver cirrhosis.   5.  Mild chronic thrombocytopenia, stable.   6.  Normocytic anemia due to anemia of chronic disease and liver cirrhosis.   7.  Coronary artery disease, status post non-ST elevation myocardial infarction.      PLAN:   1.  I had a long discussion with the patient.  The patient has multiple ongoing problems.  He will continue to follow up with his cardiologist regarding his recent MI.      2.  Discussed regarding his liver cirrhosis,  splenomegaly and liver lesions.  I explained to him last CT chest angiogram revealed splenomegaly. No abnormal  lesions.      I told him we need to continue to monitor him.  In 6 months' time, we will get abdominal ultrasound and also AFB.  He is at high risk of HCC.      3.  CBC was reviewed.  I explained to him that he is mildly anemic and thrombocytopenic.  We will monitor.  This is mainly because of his liver cirrhosis.      4.  For amyloidosis, he has an upcoming appointment at [facility].      5.  He had a few questions, which were all answered.  I will see him in 6 months' time.         CLAUDIA MALONE MD             D: 2020   T: 2020   MT: DONALD      Name:     JULIO PRESCOTT   MRN:      [id number]        Account:      [id number]   :      1955           Visit Date:   2020      Document: [id number]      Video time of 15 minutes.    This office note has been dictated.            Claudia Malone MD

## 2020-11-30 NOTE — PROGRESS NOTES
"Parmjit Hernández is a 65 year old male who is being evaluated via a billable video visit.      The patient has been notified of following:     \"This video visit will be conducted via a call between you and your physician/provider. We have found that certain health care needs can be provided without the need for an in-person physical exam.  This service lets us provide the care you need with a video conversation.  If a prescription is necessary we can send it directly to your pharmacy.  If lab work is needed we can place an order for that and you can then stop by our lab to have the test done at a later time.    Video visits are billed at different rates depending on your insurance coverage.  Please reach out to your insurance provider with any questions.    If during the course of the call the physician/provider feels a video visit is not appropriate, you will not be charged for this service.\"    Patient has given verbal consent for Video visit? Yes  How would you like to obtain your AVS? MyChart     Please send a link to: [email or web address]          Video-Visit Details    Type of service:  Video Visit    Originating Location (pt. Location): Home    Distant Location (provider location):  [facility]     Platform used for Video Visit: Danny Clayton [facility]      "

## 2020-11-30 NOTE — PROGRESS NOTES
Care Suites Admission Nursing Note    Patient Information  Name: Parmjit Hernández  Age: 65 year old  Reason for admission: lumbar puncture  Care Suites arrival time: 1137    Visitor Information  Name: none  Informed of visitor restrictions: N/A  1 visitor allowed per patient   Visitor must screen negative for COVID symptoms   Visitor must wear a mask  Waiting rooms closed to visitors    Patient Admission/Assessment   Pre-procedure assessment complete: Yes  If abnormal assessment/labs, provider notified: N/A  NPO: Yes  Medications held per instructions/orders: Yes  Consent: obtained  If applicable, pregnancy test status: declined  Patient oriented to room: Yes  Education/questions answered: Yes reviewed avs discharge and copy given  Plan/other: per orders    Discharge Planning  Discharge name/phone number: yousuf rios [phone]  Overnight post sedation caregiver: yousuf Watkins location: home    Bernie Berry RN

## 2020-11-30 NOTE — PROGRESS NOTES
Care Suites Post Procedure Note    Patient Information  Name: Parmjit Hernández  Age: 65 year old    Post Procedure  Time patient returned to Care Suites:1405  Concerns/abnormal assessment: VSS. No pain. Site low back CDi with bandaid. Given coke and lunch.  If abnormal assessment, provider notified: N/A  Plan/Other: per orders. Called lab for lab draw due.    Bernie Berry RN

## 2020-11-30 NOTE — LETTER
"    11/30/2020         RE: Parmjit Hernández  [address]  [facility] [address]        Dear Colleague,    Thank you for referring your patient, Parmjit Hernández, to the [facility]. Please see a copy of my visit note below.    Parmjit Hernández is a 65 year old male who is being evaluated via a billable video visit.      The patient has been notified of following:     \"This video visit will be conducted via a call between you and your physician/provider. We have found that certain health care needs can be provided without the need for an in-person physical exam.  This service lets us provide the care you need with a video conversation.  If a prescription is necessary we can send it directly to your pharmacy.  If lab work is needed we can place an order for that and you can then stop by our lab to have the test done at a later time.    Video visits are billed at different rates depending on your insurance coverage.  Please reach out to your insurance provider with any questions.    If during the course of the call the physician/provider feels a video visit is not appropriate, you will not be charged for this service.\"    Patient has given verbal consent for Video visit? Yes  How would you like to obtain your AVS? [email or web address]     Please send a link to: [email or web address]          Video-Visit Details    Type of service:  Video Visit    Originating Location (pt. Location): Home    Distant Location (provider location):  [facility]     Platform used for Video Visit: Danny Clayton [facility]        Visit Date:   11/30/2020     HEMATOLOGY HISTORY: Mr. Hernández is a gentleman with amyloidosis, liver cirrhosis and splenic lesion.    1. 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.  2. On 04/15/2020:   -Normal WBC, hemoglobin and platelets.   -Normal potassium and creatinine.   -UA does not reveal any " infection.   -COVID-19 has been ruled out.   -CT abdomen and pelvis reveals liver cirrhosis and portal venous hypertension with splenomegaly.  There is hypodensity in the lateral aspect of the spleen, which could be hemangioma or splenic infarct.  Spleen is enlarged at 16 cm.  There are multiple small bilateral pelvic lymph nodes, likely reactive.      SUBJECTIVE:  Mr. Hernández is a 65-year-old gentleman with multiple medical problems including amyloidosis, diabetes mellitus, hypertension and liver cirrhosis.  In 04/2020,  he was seen in the hospital because of a splenic lesion.  It was likely hemangioma.  Followup was recommended.  He did not get his ultrasound.      The patient had a CT chest angiogram done on 08/17/2020.  No PE.  It revealed splenomegaly without any abnormal splenic lesion.      The patient has multiple ongoing problems.  He recently had NSTEMI.  He is following up with a cardiologist.      The patient has fatigue.  He has intermittent headache and dizziness.  No neck pain.  No chest pain.  He gets short of breath on minimal exertion.  No nausea or vomiting.  Appetite is fairly good.  No fever or chills.        All other review of systems negative.      PHYSICAL EXAMINATION:   GENERAL:  He is alert and oriented x 3.  He is not in any distress.   RESPIRATORY:  No cough.  No labored breathing.    The rest of a comprehensive physical examination is deferred due to public health emergency with video visit restrictions.      LABORATORY DATA:  Reviewed.      ASSESSMENT:   1.  Amyloidosis being followed at [facility].   2.  Status post autologous stem cell transplant.   3.  Liver cirrhosis.   4.  Splenomegaly from liver cirrhosis.   5.  Mild chronic thrombocytopenia, stable.   6.  Normocytic anemia due to anemia of chronic disease and liver cirrhosis.   7.  Coronary artery disease, status post non-ST elevation myocardial infarction.      PLAN:   1.  I had a long discussion with the patient.   The patient has multiple ongoing problems.  He will continue to follow up with his cardiologist regarding his recent MI.      2.  Discussed regarding his liver cirrhosis, splenomegaly and liver lesions.  I explained to him last CT chest angiogram revealed splenomegaly. No abnormal  lesions.      I told him we need to continue to monitor him.  In 6 months' time, we will get abdominal ultrasound and also AFB.  He is at high risk of HCC.      3.  CBC was reviewed.  I explained to him that he is mildly anemic and thrombocytopenic.  We will monitor.  This is mainly because of his liver cirrhosis.      4.  For amyloidosis, he has an upcoming appointment at [facility].      5.  He had a few questions, which were all answered.  I will see him in 6 months' time.         CLAUDIA MALONE MD             D: 2020   T: 2020   MT: DONALD      Name:     JULIO PRESCOTT   MRN:      [id number]        Account:      [id number]   :      1955           Visit Date:   2020      Document: [id number]      Video time of 15 minutes.    This office note has been dictated.          Again, thank you for allowing me to participate in the care of your patient.        Sincerely,        Claudia Malone MD

## 2020-11-30 NOTE — LETTER
"11/30/2020    Vince Henry MD  [address] [facility] [address]  [facility] [address]    RE: Parmjit Hernández       Dear Colleague,    I had the pleasure of seeing Parmjit Hernández in the [facility].    Cardiology Clinic Progress Note    Service Date: November 30, 2020    PRIMARY CARDIOLOGIST: Dr. Montague      REASON FOR VISIT: Hospital follow up for hypertension     HPI:   I had the pleasure of seeing Mr. Parnell \"Erickson\" Betty in the clinic today. 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 He underwent a coronary angiogram in 2018 through the [facility] system showing normal coronary arteries, and an echocardiogram also in 2018 showing normal LV function.    The patient was admitted to [facility] because of GI issues in April 2020 but was noted to have mild troponin elevation. He was seen by his primary care physician in follow up and there was some concern about chest discomfort. An EKG and a Lexiscan stress test was ordered. He was later seen by Dr. Montague in follow up for a video visit in June 2020. The chest discomfort had resolved and was quite atypical in nature described as a rare, intermittent sharp discomfort lasting for a few seconds to a few minutes, mild in intensity. They are nonexertional, no particular shortness of breath and he specifically denied any sweating along with those episodes. This was felt to be most likely musculoskeletal in nature. The option of a cardiac MRI with stress was discussed but the patient ultimately deferred at that time since his symptoms had resolved. His EKG was reviewed and they showed sinus rhythm with inferior Q " waves which appeared similar to previous EKGs in 2018 without any new changes.    Later, he was admitted in August 2020 after presenting with generalized weakness, back pain, and chest pain following a recent lumbar decompression surgery. Troponin was again noted to be elevated and he was seen by Dr. Stoddard in a Cardiology consultation at that time. A cardiac MRI with stress was completed on 08/18/2020 showing normal LV and RV systolic functions with no evidence of inducible ischemia. Late gadolinium enhancement imaging showed patchy delayed enhancement in the basal septal wall consistent with non CAD non specific scar. No evidence of current amyloid was noted however given elevated troponin and EKG findings, it was noted that could still have early process. A yearly cardiac MRI was suggested for ongoing surveillance.    Unfortunately, he has been hospitalized several times this fall. He was admitted in late October 2020 with leg weakness and evaluated by neurology. Guillain-Iowa syndrome was considered, however his strength was intact other than plantar flexion therefore IVIG was not felt be indicated and lumbar puncture was deferred. He saw Dr. Miles with Neurology in follow up as an outpatient and was told that if he did have Guillain-Iowa it was likely very mild and he seemed to be improving clinically.    More recently, he was admitted on 11/12/2020 after presenting with chest pain and hypertension. He had significantly elevated blood pressure and minimally elevated troponins. EKG appeared to be at baseline. His presentation was felt to be consistent with a type II demand NSTEMI secondary to elevated blood pressures given his previous extensive cardiac workup and recent cardiac MRI with stress suggesting against CAD or ischemia. The dose of his lisinopril was increased to 20 mg a day from 15 mg, he as continued on metoprolol succinate at 50 mg a day, and triamterene-hydrochlorothiazide was added at 37.5  mg-25 mg once daily. He was discharged, but unfortunately represented to the emergency department several days later on 11/15/20 with similar chest discomfort symptoms in the setting of continued elevated blood pressure up to 218/118. EKG looked unchanged from previous on file without concern for ischemic changes and troponin was detectable but not elevated. His blood pressure improved and he was subsequently discharged home.    Today, he presents to the clinic in follow up of his recent hospital visits primarily for reassessment of his blood pressure. He tells me that he has been feeling better following his discharge from the hospital. His blood pressure has been under better control in his checks at home primarily around the 110-120/70-80 range. He has had some outliers of higher readings and a few lower readings in the 80-90 systolic. He tells me that he has held or waited to take his morning medications if he has a lower reading. He tries to take in more fluids in these instances and has rechecked his blood pressures with improvement within a couple of hours. He has only had one subsequent episode of mild atypical, sharp chest discomfort following his most recent admission. He is unsure if his blood pressure was elevated at the time. He otherwise denies significant shortness of breath or any recent worsening of his chronic lower extremity edema. He has not had palpitations, dizziness, presyncope, or syncope.    ASSESSMENT AND PLAN:  1.  Recent Non-STEMI, likely demand MI  - Normal coronary angiography in 2018 at [facility] and normal recent cardiac MRI stress study as noted above.    2.  Hypertension  - Readings have been labile at times. Currently well-controlled.   - Will continue current regimen of lisinopril 20 mg daily, metoprolol succinate 50 mg daily, and triamterene-hydrochlorothiazide which was recently added at 37.5 mg-25 mg once daily. We reviewed to call the clinic if he begins having  more frequent readings under 90/60 or issues with dizziness or lightheadedness with this. Also instructed him to call if the BP begins to trend the other way with more consistent readings over 130/85 so adjustments can be made if needed.    3.  Systemic amyloidosis   - Cardiac MRI with stress 08/18/2020 showing normal LV and RV systolic functions with no evidence of inducible ischemia. Late gadolinium enhancement imaging showed patchy delayed enhancement in the basal septal wall consistent with non CAD, non-specific scar. No evidence of current amyloid was noted, however given elevated troponin and EKG findings he could still have early process. A repeat cardiac MRI in 1-2 years could be considered for ongoing surveillance.    4.  Hyperlipidemia, treated on atorvastatin 20 mg daily    5.  Obesity    6.  Bipolar, anxiety and depression disorder    7.  Diabetes mellitus with endorgan manifestations of neuropathy and edema    8.  Polyneuropathy    9.  Restless leg syndrome    10.  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  Obstructive sleep apnea, faithful with CPAP use    Thank you for the opportunity to participate in this pleasant patient's care. He will see Dr. Montague in follow up in about 6 months. We would be happy to see him sooner if needed for any concerns in the meantime.     JUNIOR Frank, CNP   Text Page  (8am - 5pm, M-F)    Orders this Visit:  No orders of the defined types were placed in this encounter.    No orders of the defined types were placed in this encounter.    There are no discontinued medications.  Encounter Diagnoses   Name Primary?     Essential hypertension Yes     NSTEMI (non-ST elevated myocardial infarction) (H)      Hyperlipidemia LDL goal <100      Other amyloidosis (H)      Type 2 diabetes mellitus with other specified complication, unspecified whether long term insulin use (H)      Restless legs syndrome (RLS)       Anxiety and depression      Bipolar I disorder (H)      Obstructive sleep apnea      Morbid obesity (H)      HTN (hypertension)      CURRENT MEDICATIONS:  Current Outpatient Medications   Medication Sig Dispense Refill     acetaminophen (TYLENOL) 325 MG tablet Take 2 tablets (650 mg) by mouth every 4 hours as needed for mild pain       alpha-lipoic acid 100 MG capsule Take 200 mg by mouth daily       ALPRAZolam (XANAX) 0.5 MG tablet Take 0.5 mg by mouth At Bedtime       aspirin (ASPIRIN LOW DOSE) 81 MG tablet Take 1 tablet (81 mg) by mouth daily 30 tablet 3     atorvastatin (LIPITOR) 20 MG tablet Take 1 tablet (20 mg) by mouth daily 90 tablet 1     cariprazine (VRAYLAR) 3 MG CAPS capsule Take 3 mg by mouth daily        divalproex sodium delayed-release (DEPAKOTE) 500 MG DR tablet Take 750 mg by mouth At Bedtime  10 tablet 0     lisinopril (ZESTRIL) 20 MG tablet Take 1 tablet (20 mg) by mouth daily 30 tablet 1     metFORMIN (GLUCOPHAGE-XR) 500 MG 24 hr tablet Take 1000mg in AM and 1000mg in  tablet 1     metoprolol succinate ER (TOPROL-XL) 50 MG 24 hr tablet Take 50 mg by mouth 2 times daily       multivitamin w/minerals (THERA-VIT-M) tablet Take 1 tablet by mouth daily       NONFORMULARY by Intrathecal route continuous - + up to 4 boluses daily (100mcg each bolus usually once or 2x per day)    Managed by Dr Pawan Shaw, [facility]     Medications in Pump:  fentanyl 2000mcg/mL  Bupivacaine 20mg/mL  Morphine 5.8mg/mL     Rate:   Fentanyl 1200mcg/day  Bupivacaine 12mg/day  Morphine 4.2mg/day  Pump Last Fill Date:  09/2020       pregabalin (LYRICA) 100 MG capsule Take 1 capsule (100 mg) by mouth 4 times daily (Patient taking differently: Take 100 mg by mouth 3 times daily ) 360 capsule 3     senna-docusate (SENOKOT-S/PERICOLACE) 8.6-50 MG tablet Take 1 tablet by mouth daily Take 1 tablet in the morning and 2 tablets in the evening.        sitagliptin (JANUVIA) 100 MG tablet Take 1 tablet (100 mg) by mouth  daily 90 tablet 0     tamsulosin (FLOMAX) 0.4 MG capsule Take 1 capsule (0.4 mg) by mouth 2 times daily 30 capsule 3     triamterene-HCTZ (MAXZIDE-25) 37.5-25 MG tablet Take 1 tablet by mouth daily 30 tablet 0     Turmeric 500 MG CAPS Take 1,200 mg by mouth daily        vitamin C (ASCORBIC ACID) 1000 MG TABS Take 1,000 mg by mouth daily       SUMAtriptan (IMITREX) 50 MG tablet Take 1 tablet (50 mg) by mouth at onset of headache for migraine May repeat in 2 hours. Max 4 tablets/24 hours. (Patient not taking: Reported on 11/30/2020) 8 tablet 1       ALLERGIES  Allergies   Allergen Reactions     Cephalexin Diarrhea     Liraglutide Other (See Comments)     Sulfa Drugs Swelling     Pt has taken  Taken sulfa drugs orally without trouble. He had problems with Sulfa eye drops. Eye swelled up     Victoza      Increased migraine frequency and severity       PAST MEDICAL, SURGICAL, FAMILY HISTORY:  History was reviewed and updated as needed, see medical record.    SOCIAL HISTORY:  Social History     Socioeconomic History     Marital status:      Spouse name: Not on file     Number of children: 3     Years of education: Not on file     Highest education level: Not on file   Occupational History     Employer: [employer]   Social Needs     Financial resource strain: Not very hard     Food insecurity     Worry: Never true     Inability: Never true     Transportation needs     Medical: No     Non-medical: No   Tobacco Use     Smoking status: Never Smoker     Smokeless tobacco: Never Used   Substance and Sexual Activity     Alcohol use: Not Currently     Alcohol/week: 0.0 standard drinks     Drug use: Not Currently     Types: Cocaine, Marijuana, Methamphetamines, Opiates, IV, Amphetamines, Barbiturates, Benzodiazepines, Codeine, Fentanyl, Hashish, Hydrocodone, Hydromorphone, LSD, Oxycodone     Sexual activity: Not Currently     Partners: Female   Lifestyle     Physical activity     Days per week: 1 day     Minutes  "per session: Not on file     Stress: Not on file   Relationships     Social connections     Talks on phone: Not on file     Gets together: Not on file     Attends Scientology service: Not on file     Active member of club or organization: Not on file     Attends meetings of clubs or organizations: Not on file     Relationship status: Not on file     Intimate partner violence     Fear of current or ex partner: Not on file     Emotionally abused: Not on file     Physically abused: Not on file     Forced sexual activity: Not on file   Other Topics Concern     Parent/sibling w/ CABG, MI or angioplasty before 65F 55M? No   Social History Narrative    Social Documentation:05/27/2010        Balanced Diet: NO    Calcium intake: 3-4 per day    Caffeine: 2 per day    Exercise:  type of activity NO    Sunscreen: Yes    Seatbelts:  Yes    Self Breast Exam:  No - na    Self Testicular Exam: no    Physical/Emotional/Sexual Abuse: No     Do you feel safe in your environment? Yes        Cholesterol screen up to date: Yes    Eye Exam up to date: Yes    Dental Exam up to date: Yes    Pap smear up to date: Does Not Apply    Mammogram up to date: Does Not Apply    Dexa Scan up to date:NO    Colonoscopy up to date:2007    Immunizations up to date: YES    Glucose screen if over 40: Yes        Sofi Rosas CMA                 Review of Systems:  Skin:  Negative     Eyes:  Negative    ENT:  Negative    Respiratory:  Positive for dyspnea on exertion;sleep apnea;CPAP  Cardiovascular:    chest pain;Positive for;edema;fatigue  Gastroenterology: Negative    Genitourinary:  Negative    Musculoskeletal:  Positive for back pain  Neurologic:  Negative    Psychiatric:  Negative    Heme/Lymph/Imm:  Positive for allergies  Endocrine:  Positive for diabetes     Physical Exam:  Vitals: /66   Pulse 83   Ht 1.702 m (5' 7\")   Wt 121.1 kg (267 lb)   BMI 41.82 kg/m     Wt Readings from Last 4 Encounters:   11/30/20 121.1 kg (267 lb)   11/19/20 " 117.9 kg (260 lb)   11/16/20 117.9 kg (260 lb)   11/14/20 117.9 kg (260 lb)     CONSTITUTIONAL: Appears his stated age, well nourished, and in no acute distress.  HEENT: Pupils equal, round. Sclerae nonicteric.    NECK: Supple, thick neck. Difficult to assess JVP, but no obvious JVD appreciated.  C/V: Distant heart sounds. Regular rate and rhythm, normal S1 and S2, no S3 or S4, no murmur, rub or gallop.   RESP: Respirations are unlabored. Lungs are clear to auscultation bilaterally without wheezing, rales, or rhonchi.  GI: Abdomen soft, obeses, non-tender, non-distended.  EXTREM: Trace lower extremity edema bilaterally. No clubbing or cyanosis.  NEURO: Alert and oriented, cooperative. Gait steady. No gross focal deficits.   PSYCH: Affect appropriate. Mentation normal. Responds to questions appropriately.  SKIN: Warm and dry. No apparent rashes or bruising.    Recent Lab Results:  LIPID RESULTS:  Lab Results   Component Value Date    CHOL 163 02/24/2020    HDL 32 (L) 02/24/2020     (H) 02/24/2020    TRIG 121 02/24/2020    CHOLHDLRATIO 5.9 (H) 09/01/2015     LIVER ENZYME RESULTS:  Lab Results   Component Value Date    AST 23 11/15/2020    ALT 57 11/15/2020     CBC RESULTS:  Lab Results   Component Value Date    WBC 7.0 11/27/2020    RBC 3.90 (L) 11/27/2020    HGB 12.6 (L) 11/27/2020    HCT 37.9 (L) 11/27/2020    MCV 97 11/27/2020    MCH 32.3 11/27/2020    MCHC 33.2 11/27/2020    RDW 14.3 11/27/2020     (L) 11/27/2020     BMP RESULTS:  Lab Results   Component Value Date     11/15/2020    POTASSIUM 4.2 11/15/2020    CHLORIDE 103 11/15/2020    CO2 25 11/15/2020    ANIONGAP 6 11/15/2020     (H) 11/15/2020    BUN 27 11/15/2020    CR 1.06 11/15/2020    GFRESTIMATED 74 11/15/2020    GFRESTBLACK 85 11/15/2020    LUIS 8.9 11/15/2020      A1C RESULTS:  Lab Results   Component Value Date    A1C 5.6 11/19/2020     INR RESULTS:  Lab Results   Component Value Date    INR 1.18 (H) 04/17/2020    INR 1.06  01/27/2018     This note was completed in part using Dragon voice recognition software. Although reviewed after completion, some word and grammatical errors may occur.      Thank you for allowing me to participate in the care of your patient.    Sincerely,     Foreign Dent NP     [facility]

## 2020-11-30 NOTE — LETTER
"    11/30/2020         RE: Parmjit Hernández  [address]  [facility] [address]        Dear Colleague,    Thank you for referring your patient, Parmjit Hernández, to the [facility]. Please see a copy of my visit note below.    Parmjit Hernández is a 65 year old male who is being evaluated via a billable video visit.      The patient has been notified of following:     \"This video visit will be conducted via a call between you and your physician/provider. We have found that certain health care needs can be provided without the need for an in-person physical exam.  This service lets us provide the care you need with a video conversation.  If a prescription is necessary we can send it directly to your pharmacy.  If lab work is needed we can place an order for that and you can then stop by our lab to have the test done at a later time.    Video visits are billed at different rates depending on your insurance coverage.  Please reach out to your insurance provider with any questions.    If during the course of the call the physician/provider feels a video visit is not appropriate, you will not be charged for this service.\"    Patient has given verbal consent for Video visit? Yes  How would you like to obtain your AVS? [email or web address]     Please send a link to: [email or web address]          Video-Visit Details    Type of service:  Video Visit    Originating Location (pt. Location): Home    Distant Location (provider location):  [facility]     Platform used for Video Visit: Danny Clayton [facility]        Visit Date:   11/30/2020     HEMATOLOGY HISTORY: Mr. Hernández is a gentleman with amyloidosis, liver cirrhosis and splenic lesion.    1. Systemic AL amyloidosis treated with autologous stem cell transplant December 27, 2016 at [facility].  2. On 04/15/2020:   -Normal WBC, hemoglobin and platelets.   -Normal potassium and creatinine.   -UA does not reveal any " infection.   -COVID-19 has been ruled out.   -CT abdomen and pelvis reveals liver cirrhosis and portal venous hypertension with splenomegaly.  There is hypodensity in the lateral aspect of the spleen, which could be hemangioma or splenic infarct.  Spleen is enlarged at 16 cm.  There are multiple small bilateral pelvic lymph nodes, likely reactive.      SUBJECTIVE:  Mr. Hernández is a 65-year-old gentleman with multiple medical problems including amyloidosis, diabetes mellitus, hypertension and liver cirrhosis.  In 04/2020,  he was seen in the hospital because of a splenic lesion.  It was likely hemangioma.  Followup was recommended.  He did not get his ultrasound.      The patient had a CT chest angiogram done on 08/17/2020.  No PE.  It revealed splenomegaly without any abnormal splenic lesion.      The patient has multiple ongoing problems.  He recently had NSTEMI.  He is following up with a cardiologist.      The patient has fatigue.  He has intermittent headache and dizziness.  No neck pain.  No chest pain.  He gets short of breath on minimal exertion.  No nausea or vomiting.  Appetite is fairly good.  No fever or chills.        All other review of systems negative.      PHYSICAL EXAMINATION:   GENERAL:  He is alert and oriented x 3.  He is not in any distress.   RESPIRATORY:  No cough.  No labored breathing.    The rest of a comprehensive physical examination is deferred due to public health emergency with video visit restrictions.      LABORATORY DATA:  Reviewed.      ASSESSMENT:   1.  Amyloidosis being followed at [facility].   2.  Status post autologous stem cell transplant.   3.  Liver cirrhosis.   4.  Splenomegaly from liver cirrhosis.   5.  Mild chronic thrombocytopenia, stable.   6.  Normocytic anemia due to anemia of chronic disease and liver cirrhosis.   7.  Coronary artery disease, status post non-ST elevation myocardial infarction.      PLAN:   1.  I had a long discussion with the patient.   The patient has multiple ongoing problems.  He will continue to follow up with his cardiologist regarding his recent MI.      2.  Discussed regarding his liver cirrhosis, splenomegaly and liver lesions.  I explained to him last CT chest angiogram revealed splenomegaly. No abnormal  lesions.      I told him we need to continue to monitor him.  In 6 months' time, we will get abdominal ultrasound and also AFB.  He is at high risk of HCC.      3.  CBC was reviewed.  I explained to him that he is mildly anemic and thrombocytopenic.  We will monitor.  This is mainly because of his liver cirrhosis.      4.  For amyloidosis, he has an upcoming appointment at [facility].      5.  He had a few questions, which were all answered.  I will see him in 6 months' time.         CLAUDIA MALONE MD             D: 2020   T: 2020   MT: DONALD      Name:     JULIO PRESCOTT   MRN:      [id number]        Account:      [id number]   :      1955           Visit Date:   2020      Document: [id number]      Video time of 15 minutes.    This office note has been dictated.          Again, thank you for allowing me to participate in the care of your patient.        Sincerely,        Claudia Malone MD

## 2020-11-30 NOTE — PROCEDURES
[facility]    Procedure: Lumbar puncture    Date/Time: 11/30/2020 1:51 PM  Performed by: Eduin Esquivel PA-C  Authorized by: Eduin Esquivel PA-C    Fellow Physician: Eduin Esquivel PA-C    UNIVERSAL PROTOCOL   Site Marked: Yes  Prior Images Obtained and Reviewed:  Yes  Required items: Required blood products, implants, devices and special equipment available    Patient identity confirmed:  Verbally with patient  Patient was reevaluated immediately before administering moderate or deep sedation or anesthesia  Confirmation Checklist:  Patient's identity using two indicators, relevant allergies, procedure was appropriate and matched the consent or emergent situation and correct equipment/implants were available  Time out: Immediately prior to the procedure a time out was called    Universal Protocol: the Joint Commission Universal Protocol was followed    Preparation: Patient was prepped and draped in usual sterile fashion           ANESTHESIA    Local Anesthetic: Lidocaine 1% without epinephrine      SEDATION    Patient Sedated: No    See dictated procedure note for full details.  PROCEDURE   Patient Tolerance:  Patient tolerated the procedure well with no immediate complications  Describe Procedure: Normal opening pressure 19 cm of water  Length of time physician/provider present for 1:1 monitoring during sedation: 0

## 2020-12-01 ENCOUNTER — PATIENT OUTREACH (OUTPATIENT)
Dept: CARE COORDINATION | Facility: CLINIC | Age: 65
End: 2020-12-01

## 2020-12-01 ENCOUNTER — TELEPHONE (OUTPATIENT)
Dept: FAMILY MEDICINE | Facility: CLINIC | Age: 65
End: 2020-12-01

## 2020-12-01 LAB
ACE CSF-CCNC: <0.4 U/L (ref 0–2.5)
B BURGDOR AB CSF IA-ACNC: 0.12 LIV

## 2020-12-01 NOTE — TELEPHONE ENCOUNTER
Patient Quality Outreach      Summary:    Patient has the following on his problem list/HM:   Hypertension   Last three blood pressure readings:  BP Readings from Last 3 Encounters:   11/30/20 107/59   11/30/20 101/66   11/19/20 90/57     Blood pressure: Passed    HTN Guidelines:  ? 139/89     Patient is due/failing the following:   None neded, I see he was discharged from the hospital today so will need a hospital follow up    Type of outreach:        Questions for provider review:    none                                                                                                                                     Sung Car ma       .

## 2020-12-01 NOTE — PROGRESS NOTES
Clinic Care Coordination Contact  Community Health Worker Follow Up    Intervention and Education during outreach:     Patient had no needs or barriers today. He said, he has 3-4 appointments a week and is doing good with managing these. He did agree that its not beneficial to have someone call just to check in. He then said, he had to go and asked if I could call back in a week. I advised Erickson he can call us if any new needs arise and provided him my number.       Patient has completed all goals with Clinic Care Coordination.  Please review the chart and confirm if graduation is approved.     CHW Plan:   CHW will send lead CCC request for graduation

## 2020-12-02 LAB
ALB CSF/SERPL: 9.2 RATIO (ref 0–9)
ALBUMIN CSF-MCNC: 39 MG/DL (ref 0–35)
ALBUMIN SERPL-MCNC: 4250 MG/DL (ref 3500–5200)
IGG CSF-MCNC: 1.7 MG/DL (ref 0–6)
IGG SERPL-MCNC: 474 MG/DL (ref 768–1632)
IGG SYNTH RATE SER+CSF CALC-MRATE: <0 MG/D
IGG/ALB CLEAR SER+CSF-RTO: 0.39 RATIO (ref 0.28–0.66)
IGG/ALB CSF: 0.04 RATIO (ref 0.09–0.25)
OLIGOCLONAL BANDS CSF ELPH-IMP: ABNORMAL
OLIGOCLONAL BANDS CSF ELPH-IMP: NEGATIVE
OLIGOCLONAL BANDS CSF IEF: 0 BANDS (ref 0–1)
VDRL CSF QL: NON REACTIVE

## 2020-12-03 ENCOUNTER — NURSE TRIAGE (OUTPATIENT)
Dept: NURSING | Facility: CLINIC | Age: 65
End: 2020-12-03

## 2020-12-03 ENCOUNTER — PATIENT OUTREACH (OUTPATIENT)
Dept: CARE COORDINATION | Facility: CLINIC | Age: 65
End: 2020-12-03

## 2020-12-03 ENCOUNTER — MYC MEDICAL ADVICE (OUTPATIENT)
Dept: FAMILY MEDICINE | Facility: CLINIC | Age: 65
End: 2020-12-03

## 2020-12-03 ENCOUNTER — HOSPITAL ENCOUNTER (INPATIENT)
Facility: CLINIC | Age: 65
LOS: 1 days | Discharge: HOME | DRG: 312 | End: 2020-12-04
Attending: EMERGENCY MEDICINE | Admitting: STUDENT IN AN ORGANIZED HEALTH CARE EDUCATION/TRAINING PROGRAM
Payer: COMMERCIAL

## 2020-12-03 ENCOUNTER — TELEPHONE (OUTPATIENT)
Dept: ONCOLOGY | Facility: CLINIC | Age: 65
End: 2020-12-03

## 2020-12-03 DIAGNOSIS — I95.9 HYPOTENSION, UNSPECIFIED HYPOTENSION TYPE: ICD-10-CM

## 2020-12-03 DIAGNOSIS — I10 HYPERTENSION GOAL BP (BLOOD PRESSURE) < 140/90: Primary | ICD-10-CM

## 2020-12-03 DIAGNOSIS — E11.9 TYPE 2 DIABETES MELLITUS WITHOUT COMPLICATION, WITHOUT LONG-TERM CURRENT USE OF INSULIN (H): ICD-10-CM

## 2020-12-03 DIAGNOSIS — N17.9 ACUTE KIDNEY INJURY (H): ICD-10-CM

## 2020-12-03 DIAGNOSIS — E87.5 HYPERKALEMIA: ICD-10-CM

## 2020-12-03 DIAGNOSIS — F41.9 ANXIETY: ICD-10-CM

## 2020-12-03 LAB
ALBUMIN SERPL-MCNC: 3.3 G/DL (ref 3.4–5)
ALBUMIN UR-MCNC: NEGATIVE MG/DL
ALP SERPL-CCNC: 80 U/L (ref 40–150)
ALT SERPL W P-5'-P-CCNC: 41 U/L (ref 0–70)
ANION GAP SERPL CALCULATED.3IONS-SCNC: 4 MMOL/L (ref 3–14)
ANION GAP SERPL CALCULATED.3IONS-SCNC: 5 MMOL/L (ref 3–14)
ANION GAP SERPL CALCULATED.3IONS-SCNC: 5 MMOL/L (ref 3–14)
ANION GAP SERPL CALCULATED.3IONS-SCNC: 8 MMOL/L (ref 3–14)
APPEARANCE UR: CLEAR
AST SERPL W P-5'-P-CCNC: 20 U/L (ref 0–45)
BASE DEFICIT BLDV-SCNC: 3.9 MMOL/L
BASE DEFICIT BLDV-SCNC: 7.9 MMOL/L
BASOPHILS # BLD AUTO: 0 10E9/L (ref 0–0.2)
BASOPHILS NFR BLD AUTO: 0.6 %
BILIRUB DIRECT SERPL-MCNC: 0.1 MG/DL (ref 0–0.2)
BILIRUB SERPL-MCNC: 0.3 MG/DL (ref 0.2–1.3)
BILIRUB UR QL STRIP: NEGATIVE
BUN SERPL-MCNC: 40 MG/DL (ref 7–30)
BUN SERPL-MCNC: 45 MG/DL (ref 7–30)
BUN SERPL-MCNC: 47 MG/DL (ref 7–30)
BUN SERPL-MCNC: 48 MG/DL (ref 7–30)
CALCIUM SERPL-MCNC: 7.9 MG/DL (ref 8.5–10.1)
CALCIUM SERPL-MCNC: 8.2 MG/DL (ref 8.5–10.1)
CALCIUM SERPL-MCNC: 8.3 MG/DL (ref 8.5–10.1)
CALCIUM SERPL-MCNC: 8.5 MG/DL (ref 8.5–10.1)
CHLORIDE SERPL-SCNC: 112 MMOL/L (ref 94–109)
CHLORIDE SERPL-SCNC: 112 MMOL/L (ref 94–109)
CHLORIDE SERPL-SCNC: 114 MMOL/L (ref 94–109)
CHLORIDE SERPL-SCNC: 115 MMOL/L (ref 94–109)
CO2 SERPL-SCNC: 17 MMOL/L (ref 20–32)
CO2 SERPL-SCNC: 19 MMOL/L (ref 20–32)
CO2 SERPL-SCNC: 21 MMOL/L (ref 20–32)
CO2 SERPL-SCNC: 21 MMOL/L (ref 20–32)
COLOR UR AUTO: YELLOW
CREAT SERPL-MCNC: 1.33 MG/DL (ref 0.66–1.25)
CREAT SERPL-MCNC: 1.59 MG/DL (ref 0.66–1.25)
CREAT SERPL-MCNC: 1.73 MG/DL (ref 0.66–1.25)
CREAT SERPL-MCNC: 1.84 MG/DL (ref 0.66–1.25)
DIFFERENTIAL METHOD BLD: ABNORMAL
EOSINOPHIL # BLD AUTO: 0.2 10E9/L (ref 0–0.7)
EOSINOPHIL NFR BLD AUTO: 3.4 %
ERYTHROCYTE [DISTWIDTH] IN BLOOD BY AUTOMATED COUNT: 14.3 % (ref 10–15)
GFR SERPL CREATININE-BSD FRML MDRD: 38 ML/MIN/{1.73_M2}
GFR SERPL CREATININE-BSD FRML MDRD: 41 ML/MIN/{1.73_M2}
GFR SERPL CREATININE-BSD FRML MDRD: 45 ML/MIN/{1.73_M2}
GFR SERPL CREATININE-BSD FRML MDRD: 56 ML/MIN/{1.73_M2}
GLUCOSE BLDC GLUCOMTR-MCNC: 100 MG/DL (ref 70–99)
GLUCOSE BLDC GLUCOMTR-MCNC: 118 MG/DL (ref 70–99)
GLUCOSE BLDC GLUCOMTR-MCNC: 121 MG/DL (ref 70–99)
GLUCOSE BLDC GLUCOMTR-MCNC: 132 MG/DL (ref 70–99)
GLUCOSE BLDC GLUCOMTR-MCNC: 133 MG/DL (ref 70–99)
GLUCOSE BLDC GLUCOMTR-MCNC: 136 MG/DL (ref 70–99)
GLUCOSE BLDC GLUCOMTR-MCNC: 83 MG/DL (ref 70–99)
GLUCOSE SERPL-MCNC: 106 MG/DL (ref 70–99)
GLUCOSE SERPL-MCNC: 113 MG/DL (ref 70–99)
GLUCOSE SERPL-MCNC: 115 MG/DL (ref 70–99)
GLUCOSE SERPL-MCNC: 149 MG/DL (ref 70–99)
GLUCOSE UR STRIP-MCNC: NEGATIVE MG/DL
HCO3 BLDV-SCNC: 19 MMOL/L (ref 21–28)
HCO3 BLDV-SCNC: 22 MMOL/L (ref 21–28)
HCT VFR BLD AUTO: 33.1 % (ref 40–53)
HGB BLD-MCNC: 11 G/DL (ref 13.3–17.7)
HGB UR QL STRIP: NEGATIVE
HYALINE CASTS #/AREA URNS LPF: 14 /LPF (ref 0–2)
IMM GRANULOCYTES # BLD: 0 10E9/L (ref 0–0.4)
IMM GRANULOCYTES NFR BLD: 0.3 %
INTERPRETATION ECG - MUSE: NORMAL
INTERPRETATION ECG - MUSE: NORMAL
KETONES UR STRIP-MCNC: NEGATIVE MG/DL
LACTATE BLD-SCNC: 1.4 MMOL/L (ref 0.7–2)
LEUKOCYTE ESTERASE UR QL STRIP: NEGATIVE
LYMPHOCYTES # BLD AUTO: 1.1 10E9/L (ref 0.8–5.3)
LYMPHOCYTES NFR BLD AUTO: 17.7 %
MAGNESIUM SERPL-MCNC: 2.3 MG/DL (ref 1.6–2.3)
MCH RBC QN AUTO: 32 PG (ref 26.5–33)
MCHC RBC AUTO-ENTMCNC: 33.2 G/DL (ref 31.5–36.5)
MCV RBC AUTO: 96 FL (ref 78–100)
MONOCYTES # BLD AUTO: 0.6 10E9/L (ref 0–1.3)
MONOCYTES NFR BLD AUTO: 9 %
MUCOUS THREADS #/AREA URNS LPF: PRESENT /LPF
NEUTROPHILS # BLD AUTO: 4.3 10E9/L (ref 1.6–8.3)
NEUTROPHILS NFR BLD AUTO: 69 %
NITRATE UR QL: NEGATIVE
NRBC # BLD AUTO: 0 10*3/UL
NRBC BLD AUTO-RTO: 0 /100
O2/TOTAL GAS SETTING VFR VENT: ABNORMAL %
OXYHGB MFR BLDV: 84 %
OXYHGB MFR BLDV: 93 %
PCO2 BLDV: 44 MM HG (ref 40–50)
PCO2 BLDV: 44 MM HG (ref 40–50)
PH BLDV: 7.25 PH (ref 7.32–7.43)
PH BLDV: 7.32 PH (ref 7.32–7.43)
PH UR STRIP: 5 PH (ref 5–7)
PLATELET # BLD AUTO: 126 10E9/L (ref 150–450)
PO2 BLDV: 56 MM HG (ref 25–47)
PO2 BLDV: 76 MM HG (ref 25–47)
POTASSIUM SERPL-SCNC: 5.9 MMOL/L (ref 3.4–5.3)
POTASSIUM SERPL-SCNC: 6 MMOL/L (ref 3.4–5.3)
POTASSIUM SERPL-SCNC: 6.2 MMOL/L (ref 3.4–5.3)
POTASSIUM SERPL-SCNC: 6.6 MMOL/L (ref 3.4–5.3)
POTASSIUM SERPL-SCNC: 6.7 MMOL/L (ref 3.4–5.3)
POTASSIUM SERPL-SCNC: 7.2 MMOL/L (ref 3.4–5.3)
PROT SERPL-MCNC: 5.8 G/DL (ref 6.8–8.8)
RBC # BLD AUTO: 3.44 10E12/L (ref 4.4–5.9)
RBC #/AREA URNS AUTO: 0 /HPF (ref 0–2)
SODIUM SERPL-SCNC: 137 MMOL/L (ref 133–144)
SODIUM SERPL-SCNC: 138 MMOL/L (ref 133–144)
SODIUM SERPL-SCNC: 138 MMOL/L (ref 133–144)
SODIUM SERPL-SCNC: 140 MMOL/L (ref 133–144)
SOURCE: ABNORMAL
SP GR UR STRIP: 1.01 (ref 1–1.03)
UROBILINOGEN UR STRIP-MCNC: NORMAL MG/DL (ref 0–2)
WBC # BLD AUTO: 6.2 10E9/L (ref 4–11)
WBC #/AREA URNS AUTO: 1 /HPF (ref 0–5)

## 2020-12-03 PROCEDURE — 250N000011 HC RX IP 250 OP 636: Performed by: STUDENT IN AN ORGANIZED HEALTH CARE EDUCATION/TRAINING PROGRAM

## 2020-12-03 PROCEDURE — 83735 ASSAY OF MAGNESIUM: CPT | Performed by: EMERGENCY MEDICINE

## 2020-12-03 PROCEDURE — 250N000009 HC RX 250: Performed by: STUDENT IN AN ORGANIZED HEALTH CARE EDUCATION/TRAINING PROGRAM

## 2020-12-03 PROCEDURE — 82805 BLOOD GASES W/O2 SATURATION: CPT | Performed by: INTERNAL MEDICINE

## 2020-12-03 PROCEDURE — 82805 BLOOD GASES W/O2 SATURATION: CPT | Performed by: EMERGENCY MEDICINE

## 2020-12-03 PROCEDURE — 85025 COMPLETE CBC W/AUTO DIFF WBC: CPT | Performed by: EMERGENCY MEDICINE

## 2020-12-03 PROCEDURE — 80076 HEPATIC FUNCTION PANEL: CPT | Performed by: EMERGENCY MEDICINE

## 2020-12-03 PROCEDURE — 258N000001 HC RX 258: Performed by: STUDENT IN AN ORGANIZED HEALTH CARE EDUCATION/TRAINING PROGRAM

## 2020-12-03 PROCEDURE — 96361 HYDRATE IV INFUSION ADD-ON: CPT

## 2020-12-03 PROCEDURE — 258N000003 HC RX IP 258 OP 636: Performed by: STUDENT IN AN ORGANIZED HEALTH CARE EDUCATION/TRAINING PROGRAM

## 2020-12-03 PROCEDURE — 83605 ASSAY OF LACTIC ACID: CPT | Performed by: STUDENT IN AN ORGANIZED HEALTH CARE EDUCATION/TRAINING PROGRAM

## 2020-12-03 PROCEDURE — 999N000127 HC STATISTIC PERIPHERAL IV START W US GUIDANCE

## 2020-12-03 PROCEDURE — 84132 ASSAY OF SERUM POTASSIUM: CPT | Performed by: STUDENT IN AN ORGANIZED HEALTH CARE EDUCATION/TRAINING PROGRAM

## 2020-12-03 PROCEDURE — 250N000012 HC RX MED GY IP 250 OP 636 PS 637: Performed by: EMERGENCY MEDICINE

## 2020-12-03 PROCEDURE — 250N000013 HC RX MED GY IP 250 OP 250 PS 637: Performed by: STUDENT IN AN ORGANIZED HEALTH CARE EDUCATION/TRAINING PROGRAM

## 2020-12-03 PROCEDURE — 93005 ELECTROCARDIOGRAM TRACING: CPT

## 2020-12-03 PROCEDURE — 81001 URINALYSIS AUTO W/SCOPE: CPT | Performed by: EMERGENCY MEDICINE

## 2020-12-03 PROCEDURE — C9803 HOPD COVID-19 SPEC COLLECT: HCPCS

## 2020-12-03 PROCEDURE — 96360 HYDRATION IV INFUSION INIT: CPT

## 2020-12-03 PROCEDURE — 999N001017 HC STATISTIC GLUCOSE BY METER IP

## 2020-12-03 PROCEDURE — 250N000012 HC RX MED GY IP 250 OP 636 PS 637: Performed by: STUDENT IN AN ORGANIZED HEALTH CARE EDUCATION/TRAINING PROGRAM

## 2020-12-03 PROCEDURE — 99285 EMERGENCY DEPT VISIT HI MDM: CPT | Mod: 25

## 2020-12-03 PROCEDURE — U0003 INFECTIOUS AGENT DETECTION BY NUCLEIC ACID (DNA OR RNA); SEVERE ACUTE RESPIRATORY SYNDROME CORONAVIRUS 2 (SARS-COV-2) (CORONAVIRUS DISEASE [COVID-19]), AMPLIFIED PROBE TECHNIQUE, MAKING USE OF HIGH THROUGHPUT TECHNOLOGIES AS DESCRIBED BY CMS-2020-01-R: HCPCS | Performed by: EMERGENCY MEDICINE

## 2020-12-03 PROCEDURE — 250N000009 HC RX 250: Performed by: EMERGENCY MEDICINE

## 2020-12-03 PROCEDURE — 120N000001 HC R&B MED SURG/OB

## 2020-12-03 PROCEDURE — 80048 BASIC METABOLIC PNL TOTAL CA: CPT | Performed by: EMERGENCY MEDICINE

## 2020-12-03 PROCEDURE — 36415 COLL VENOUS BLD VENIPUNCTURE: CPT | Performed by: STUDENT IN AN ORGANIZED HEALTH CARE EDUCATION/TRAINING PROGRAM

## 2020-12-03 PROCEDURE — 258N000003 HC RX IP 258 OP 636: Performed by: EMERGENCY MEDICINE

## 2020-12-03 PROCEDURE — 99223 1ST HOSP IP/OBS HIGH 75: CPT | Mod: AI | Performed by: STUDENT IN AN ORGANIZED HEALTH CARE EDUCATION/TRAINING PROGRAM

## 2020-12-03 PROCEDURE — 258N000001 HC RX 258: Performed by: EMERGENCY MEDICINE

## 2020-12-03 PROCEDURE — 250N000013 HC RX MED GY IP 250 OP 250 PS 637: Performed by: EMERGENCY MEDICINE

## 2020-12-03 PROCEDURE — 94640 AIRWAY INHALATION TREATMENT: CPT

## 2020-12-03 PROCEDURE — 250N000011 HC RX IP 250 OP 636: Performed by: EMERGENCY MEDICINE

## 2020-12-03 PROCEDURE — 258N000002 HC RX IP 258 OP 250: Performed by: STUDENT IN AN ORGANIZED HEALTH CARE EDUCATION/TRAINING PROGRAM

## 2020-12-03 PROCEDURE — 250N000013 HC RX MED GY IP 250 OP 250 PS 637: Performed by: INTERNAL MEDICINE

## 2020-12-03 PROCEDURE — 36415 COLL VENOUS BLD VENIPUNCTURE: CPT | Performed by: INTERNAL MEDICINE

## 2020-12-03 PROCEDURE — 80048 BASIC METABOLIC PNL TOTAL CA: CPT | Performed by: INTERNAL MEDICINE

## 2020-12-03 RX ORDER — DEXTROSE MONOHYDRATE 25 G/50ML
25 INJECTION, SOLUTION INTRAVENOUS ONCE
Status: COMPLETED | OUTPATIENT
Start: 2020-12-03 | End: 2020-12-03

## 2020-12-03 RX ORDER — FUROSEMIDE 10 MG/ML
40 INJECTION INTRAMUSCULAR; INTRAVENOUS ONCE
Status: DISCONTINUED | OUTPATIENT
Start: 2020-12-03 | End: 2020-12-03

## 2020-12-03 RX ORDER — DIVALPROEX SODIUM 500 MG/1
500 TABLET, DELAYED RELEASE ORAL AT BEDTIME
Status: DISCONTINUED | OUTPATIENT
Start: 2020-12-03 | End: 2020-12-04 | Stop reason: HOSPADM

## 2020-12-03 RX ORDER — NICOTINE POLACRILEX 4 MG
15-30 LOZENGE BUCCAL
Status: DISCONTINUED | OUTPATIENT
Start: 2020-12-03 | End: 2020-12-03

## 2020-12-03 RX ORDER — DEXTROSE MONOHYDRATE 25 G/50ML
25-50 INJECTION, SOLUTION INTRAVENOUS
Status: DISCONTINUED | OUTPATIENT
Start: 2020-12-03 | End: 2020-12-04 | Stop reason: HOSPADM

## 2020-12-03 RX ORDER — LIDOCAINE 40 MG/G
CREAM TOPICAL
Status: DISCONTINUED | OUTPATIENT
Start: 2020-12-03 | End: 2020-12-04 | Stop reason: HOSPADM

## 2020-12-03 RX ORDER — ATORVASTATIN CALCIUM 20 MG/1
20 TABLET, FILM COATED ORAL DAILY
Status: DISCONTINUED | OUTPATIENT
Start: 2020-12-03 | End: 2020-12-04 | Stop reason: HOSPADM

## 2020-12-03 RX ORDER — DEXTROSE MONOHYDRATE 25 G/50ML
25 INJECTION, SOLUTION INTRAVENOUS ONCE
Status: DISCONTINUED | OUTPATIENT
Start: 2020-12-03 | End: 2020-12-03

## 2020-12-03 RX ORDER — DEXTROSE MONOHYDRATE 100 MG/ML
INJECTION, SOLUTION INTRAVENOUS CONTINUOUS
Status: DISCONTINUED | OUTPATIENT
Start: 2020-12-03 | End: 2020-12-04

## 2020-12-03 RX ORDER — SODIUM POLYSTYRENE SULFONATE 15 G/60ML
15 SUSPENSION ORAL; RECTAL ONCE
Status: COMPLETED | OUTPATIENT
Start: 2020-12-03 | End: 2020-12-03

## 2020-12-03 RX ORDER — DEXTROSE MONOHYDRATE 100 MG/ML
INJECTION, SOLUTION INTRAVENOUS CONTINUOUS
Status: DISCONTINUED | OUTPATIENT
Start: 2020-12-03 | End: 2020-12-03

## 2020-12-03 RX ORDER — ALBUTEROL SULFATE 5 MG/ML
10 SOLUTION RESPIRATORY (INHALATION) ONCE
Status: DISCONTINUED | OUTPATIENT
Start: 2020-12-03 | End: 2020-12-03

## 2020-12-03 RX ORDER — ALBUTEROL SULFATE 90 UG/1
6 AEROSOL, METERED RESPIRATORY (INHALATION) ONCE
Status: DISCONTINUED | OUTPATIENT
Start: 2020-12-03 | End: 2020-12-03

## 2020-12-03 RX ORDER — ALPRAZOLAM 0.25 MG
0.5 TABLET ORAL AT BEDTIME
Status: DISCONTINUED | OUTPATIENT
Start: 2020-12-03 | End: 2020-12-04 | Stop reason: HOSPADM

## 2020-12-03 RX ORDER — ALBUTEROL SULFATE 90 UG/1
6 AEROSOL, METERED RESPIRATORY (INHALATION) ONCE
Status: COMPLETED | OUTPATIENT
Start: 2020-12-03 | End: 2020-12-03

## 2020-12-03 RX ORDER — DIVALPROEX SODIUM 500 MG/1
1000 TABLET, DELAYED RELEASE ORAL
COMMUNITY
End: 2020-12-03

## 2020-12-03 RX ORDER — TAMSULOSIN HYDROCHLORIDE 0.4 MG/1
0.4 CAPSULE ORAL
COMMUNITY
Start: 2018-12-26 | End: 2020-12-03

## 2020-12-03 RX ORDER — ONDANSETRON 2 MG/ML
4 INJECTION INTRAMUSCULAR; INTRAVENOUS EVERY 6 HOURS PRN
Status: DISCONTINUED | OUTPATIENT
Start: 2020-12-03 | End: 2020-12-04 | Stop reason: HOSPADM

## 2020-12-03 RX ORDER — ACETAMINOPHEN 325 MG/1
650 TABLET ORAL EVERY 4 HOURS PRN
Status: DISCONTINUED | OUTPATIENT
Start: 2020-12-03 | End: 2020-12-04 | Stop reason: HOSPADM

## 2020-12-03 RX ORDER — SODIUM CHLORIDE 9 MG/ML
INJECTION, SOLUTION INTRAVENOUS ONCE
Status: COMPLETED | OUTPATIENT
Start: 2020-12-03 | End: 2020-12-03

## 2020-12-03 RX ORDER — ONDANSETRON 4 MG/1
4 TABLET, ORALLY DISINTEGRATING ORAL EVERY 6 HOURS PRN
Status: DISCONTINUED | OUTPATIENT
Start: 2020-12-03 | End: 2020-12-04 | Stop reason: HOSPADM

## 2020-12-03 RX ORDER — DEXTROSE MONOHYDRATE 25 G/50ML
25-50 INJECTION, SOLUTION INTRAVENOUS
Status: DISCONTINUED | OUTPATIENT
Start: 2020-12-03 | End: 2020-12-03

## 2020-12-03 RX ORDER — PREGABALIN 100 MG/1
100 CAPSULE ORAL 3 TIMES DAILY
Status: DISCONTINUED | OUTPATIENT
Start: 2020-12-03 | End: 2020-12-04 | Stop reason: HOSPADM

## 2020-12-03 RX ORDER — NICOTINE POLACRILEX 4 MG
15-30 LOZENGE BUCCAL
Status: DISCONTINUED | OUTPATIENT
Start: 2020-12-03 | End: 2020-12-04 | Stop reason: HOSPADM

## 2020-12-03 RX ADMIN — ALPRAZOLAM 0.5 MG: 0.25 TABLET ORAL at 22:09

## 2020-12-03 RX ADMIN — PREGABALIN 100 MG: 100 CAPSULE ORAL at 19:03

## 2020-12-03 RX ADMIN — SODIUM CHLORIDE 5 UNITS: 9 INJECTION, SOLUTION INTRAVENOUS at 07:25

## 2020-12-03 RX ADMIN — MICONAZOLE NITRATE: 20 POWDER TOPICAL at 18:32

## 2020-12-03 RX ADMIN — DEXTROSE MONOHYDRATE: 100 INJECTION, SOLUTION INTRAVENOUS at 18:10

## 2020-12-03 RX ADMIN — DEXTROSE MONOHYDRATE 25 G: 25 INJECTION, SOLUTION INTRAVENOUS at 07:23

## 2020-12-03 RX ADMIN — SODIUM CHLORIDE 10 UNITS: 9 INJECTION, SOLUTION INTRAVENOUS at 16:59

## 2020-12-03 RX ADMIN — CALCIUM GLUCONATE 1 G: 98 INJECTION, SOLUTION INTRAVENOUS at 16:57

## 2020-12-03 RX ADMIN — SODIUM CHLORIDE 75 ML/HR: 9 INJECTION, SOLUTION INTRAVENOUS at 09:30

## 2020-12-03 RX ADMIN — SODIUM BICARBONATE 50 MEQ: 84 INJECTION INTRAVENOUS at 10:59

## 2020-12-03 RX ADMIN — SODIUM CHLORIDE 500 ML: 9 INJECTION, SOLUTION INTRAVENOUS at 06:35

## 2020-12-03 RX ADMIN — SODIUM POLYSTYRENE SULFONATE 15 G: 15 SUSPENSION ORAL; RECTAL at 20:54

## 2020-12-03 RX ADMIN — CALCIUM GLUCONATE 1 G: 98 INJECTION, SOLUTION INTRAVENOUS at 07:25

## 2020-12-03 RX ADMIN — DEXTROSE MONOHYDRATE: 100 INJECTION, SOLUTION INTRAVENOUS at 21:03

## 2020-12-03 RX ADMIN — SODIUM CHLORIDE 500 ML: 9 INJECTION, SOLUTION INTRAVENOUS at 07:13

## 2020-12-03 RX ADMIN — SODIUM CHLORIDE 10 UNITS: 9 INJECTION, SOLUTION INTRAVENOUS at 10:32

## 2020-12-03 RX ADMIN — SODIUM BICARBONATE 50 MEQ: 84 INJECTION INTRAVENOUS at 17:06

## 2020-12-03 RX ADMIN — ALBUTEROL SULFATE 6 PUFF: 90 AEROSOL, METERED RESPIRATORY (INHALATION) at 07:15

## 2020-12-03 RX ADMIN — SODIUM CHLORIDE 1000 ML: 9 INJECTION, SOLUTION INTRAVENOUS at 10:09

## 2020-12-03 RX ADMIN — DEXTROSE MONOHYDRATE 25 G: 25 INJECTION, SOLUTION INTRAVENOUS at 16:59

## 2020-12-03 RX ADMIN — SODIUM BICARBONATE: 84 INJECTION, SOLUTION INTRAVENOUS at 13:55

## 2020-12-03 RX ADMIN — CALCIUM GLUCONATE 1 G: 98 INJECTION, SOLUTION INTRAVENOUS at 10:47

## 2020-12-03 RX ADMIN — DEXTROSE MONOHYDRATE 75 ML/HR: 100 INJECTION, SOLUTION INTRAVENOUS at 07:59

## 2020-12-03 RX ADMIN — SODIUM POLYSTYRENE SULFONATE 15 G: 15 SUSPENSION ORAL; RECTAL at 16:31

## 2020-12-03 RX ADMIN — DEXTROSE MONOHYDRATE 25 G: 25 INJECTION, SOLUTION INTRAVENOUS at 10:26

## 2020-12-03 RX ADMIN — DIVALPROEX SODIUM 500 MG: 500 TABLET, DELAYED RELEASE ORAL at 22:09

## 2020-12-03 RX ADMIN — PREGABALIN 100 MG: 100 CAPSULE ORAL at 22:09

## 2020-12-03 ASSESSMENT — ENCOUNTER SYMPTOMS
NAUSEA: 0
LIGHT-HEADEDNESS: 1
VOMITING: 0
ABDOMINAL PAIN: 0
FEVER: 0
DIZZINESS: 0
COUGH: 0
SHORTNESS OF BREATH: 0
BLOOD IN STOOL: 0
HEMATURIA: 0
DIARRHEA: 1

## 2020-12-03 ASSESSMENT — ACTIVITIES OF DAILY LIVING (ADL)
ADLS_ACUITY_SCORE: 13

## 2020-12-03 ASSESSMENT — MIFFLIN-ST. JEOR: SCORE: 1900.77

## 2020-12-03 NOTE — CONSULTS
[facility]    Nephrology Consultation     Date of Admission:  12/3/2020    Assessment & Plan     Parmjit Hernández is a 65 year old male who was admitted on 12/3/2020.     1) Baseline Normal Kidney Function with normal K .    2) Treatment of HTN with lisinopril, Triamterene-hydrochlorothiazide and metoprolol.    3) Low BP over the last few days.    4) KWABENA:  Likely prerenal due to low BP, diuretic, and ACEi.    5) High K intake of late with generous Gatorade.    6) Hyperkalemia:  Multifactorial.  Prerenal effect.  ACEi.  Triamterene.  High K intake. KWABENA.  Respiratory acidosis ?      7) No urine retention seen by bladder scan.        Plan:    Low K diet  Agree with holding lisinopril, Maxzide and metoprolol  Shifting meds as above  Kayexalate 15 grams given with another dose ordered for 2000.    IV maintenance with bicarb.    Serial blood chemistries.      Sherwin Carpenter MD  [facility] - Nephrology  [phone]    Reason for Consult     I was asked to see the patient for \[facility] [place] [facility]\""ekalemia.    Primary Care Physician     Vince Henry    Chief Complaint     Hyperkalemia    History is obtained from the patient and chart review.      History of Present Illness     Parmjit Hernández is a 65 year old male who presents with hyperkalemia.    He was admitted earlier today via ED after presenting with hypotension.    He takes his BP at home daily. He had a reading of 80/54 this AM.    He has had low BP a few times in the last few days.  He even held his AM meds three times due to low BP.    He has had some loose stool x 4 in the last 12 hours.  He says he has been drinking lots of Gatorade.      He has NORMA and uses a CPAP mask at home.      At baseline his cr runs ~1 and his potassium is normal.    Initial cr was 1.84 and K 6.2.  Bicarb was 6.2.  .  CO2 17.  He had albuterol, D50, insulin 5 units IV, and a follow up K was 6.6.      He had another round of D50 and insulin 10 units and NaHCO3  1 amp.    A follow up K was 6.0 and later 7.2.  He will be receiving another round of D50, insulin, and NaHCO3.    He has had Kayexalate 15 grams and has another 15 grams ordered for 2000.      Venous blood gas showed pH 7.25/pCO2 44 earlier this AM.      He is treated for HTN chronically with lisinopril 20 mg, Maxzide-25 and metoprolol 50 mg BID.      EKG shows normal QRS duration and no peaked T waves.      Sylvia Villalta RN did a bedside post void residual which was < 30 mL.      Past Medical History   I have reviewed this patient's medical history and updated it with pertinent information if needed.   Past Medical History:   Diagnosis Date     Acquired lymphedema 2/4/2019     Allergic state      Amyloidosis (H)     followed by hematology Dr. Romeo status post peripheral stem cell transplant     Anxiety 5/11/2016     Anxiety and depression 12/18/2013     Bipolar I disorder (H) 9/1/2015    Under care of psychiatrist [facility]- Dr Rahman doxepin 25 mg, 2 cap bedtime DULoxetine 20 mg once daily  OLANZapine 7.5 mg  1 tab bedtime      Depressive disorder 1995     Diabetes mellitus (H)     type 2     EKG abnormality 4/20/2020     Guillain Barré syndrome (H)      H/O bone marrow transplant (H)      Headache(784.0)      History of diverticulitis 1/27/2015    1/15-rxed with antibiotics      Hyperlipidemia LDL goal <100 10/31/2010     Hypertension 2007     Hypertension goal BP (blood pressure) < 140/90 10/11/2011     Marianne (H) 8/20/2015     Morbid obesity (H) 8/28/2018     Myalgia and myositis, unspecified      Narcotic dependence (H) 9/6/2016    None in about 6 months- 8/1/17     NSTEMI (non-ST elevated myocardial infarction) (H) 04/19/2020     Obsessive-compulsive disorders      Obstructive sleep apnea 7/16/2008     OCD (obsessive compulsive disorder)      Other acquired absence of organ 94     Other cirrhosis of liver (H) possibly from vences  4/15/2020     Other specified viral warts      Pain in the abdomen      Peripheral  edema 5/20/2018    Noncardiac Continue with  furosemide (LASIX) 20 MG tablet,  potassium       chloride SA (K-DUR/KLOR-CON M) 10 MEQ CR  Tab once daily  Basic metabolic panel     Polyneuropathy associated with underlying disease (H) 2/4/2019     Pure hypercholesterolemia      Renal failure 5/10/2019     Restless legs syndrome (RLS) 6/29/2017     Sleep apnea      Stasis dermatitis of both legs 12/31/2018     Type 2 diabetes mellitus with other specified complication (H) 7/10/2017       Past Surgical History   I have reviewed this patient's surgical history and updated it with pertinent information if needed.  Past Surgical History:   Procedure Laterality Date     ABDOMEN SURGERY  2007    abdominal hernia     BACK SURGERY  8/6/2020     BIOPSY  june 2015    negative     BMT PROTOCOL      for systemic amyloidosis     BONE MARROW BIOPSY, BONE SPECIMEN, NEEDLE/TROCAR N/A 6/8/2016    Procedure: BIOPSY BONE MARROW;  Surgeon: Nathan Agrawal MD;  Location:  GI     BONE MARROW BIOPSY, BONE SPECIMEN, NEEDLE/TROCAR N/A 2/20/2019    Procedure: BIOPSY BONE MARROW;  Surgeon: Michael Raygoza MD;  Location:  GI     CHOLECYSTECTOMY  1995    lap qian     COLONOSCOPY  2012    hx polyps     ESOPHAGOSCOPY, GASTROSCOPY, DUODENOSCOPY (EGD), COMBINED N/A 5/29/2015    Procedure: COMBINED ESOPHAGOSCOPY, GASTROSCOPY, DUODENOSCOPY (EGD), BIOPSY SINGLE OR MULTIPLE;  Surgeon: John Jacob MD;  Location:  GI     HERNIA REPAIR, UMBILICAL  2006     [facility] NONSPECIFIC PROCEDURE  94    Cholecystectomy     [facility] NONSPECIFIC PROCEDURE  2000    repair deviated septum       Prior to Admission Medications   Prior to Admission Medications   Prescriptions Last Dose Informant Patient Reported? Taking?   ALPRAZolam (XANAX) 0.5 MG tablet 12/2/2020 at Unknown time Self Yes Yes   Sig: Take 0.5 mg by mouth At Bedtime   NONFORMULARY 12/3/2020 at Unknown time Self Yes Yes   Sig: by Intrathecal route continuous - + up to 4 boluses daily  (100mcg each bolus usually once or 2x per day)    Managed by [facility]     Medications in Pump:  fentanyl 2000mcg/mL  Bupivacaine 20mg/mL  Morphine 5.8mg/mL     Rate:   Fentanyl 1200mcg/day  Bupivacaine 12mg/day  Morphine 4.2mg/day  Pump Last Fill Date:  09/2020   SUMAtriptan (IMITREX) 50 MG tablet  Self No Yes   Sig: Take 1 tablet (50 mg) by mouth at onset of headache for migraine May repeat in 2 hours. Max 4 tablets/24 hours.   TURMERIC PO   Yes Yes   Sig: Take 500 mg by mouth   Turmeric 500 MG CAPS 12/2/2020 at Unknown time Self Yes Yes   Sig: Take 1,200 mg by mouth daily    acetaminophen (TYLENOL) 325 MG tablet  Self No Yes   Sig: Take 2 tablets (650 mg) by mouth every 4 hours as needed for mild pain   alpha-lipoic acid 100 MG capsule 12/2/2020 at Unknown time Self Yes Yes   Sig: Take 200 mg by mouth daily   aspirin (ASPIRIN LOW DOSE) 81 MG tablet 12/2/2020 at Unknown time Self No Yes   Sig: Take 1 tablet (81 mg) by mouth daily   atorvastatin (LIPITOR) 20 MG tablet 12/2/2020 at Unknown time Self No Yes   Sig: Take 1 tablet (20 mg) by mouth daily   cariprazine (VRAYLAR) 3 MG CAPS capsule 12/2/2020 at Unknown time Self Yes Yes   Sig: Take 3 mg by mouth daily    divalproex sodium delayed-release (DEPAKOTE) 500 MG DR tablet 12/2/2020 at Unknown time Self Yes Yes   Sig: Take 500 mg by mouth At Bedtime    lisinopril (ZESTRIL) 20 MG tablet 12/2/2020 at Unknown time  No Yes   Sig: Take 1 tablet (20 mg) by mouth daily   metFORMIN (GLUCOPHAGE-XR) 500 MG 24 hr tablet 12/2/2020 at Unknown time Self No Yes   Sig: Take 1000mg in AM and 1000mg in PM   metoprolol succinate ER (TOPROL-XL) 50 MG 24 hr tablet 12/2/2020 at Unknown time  Yes Yes   Sig: Take 50 mg by mouth 2 times daily   multivitamin w/minerals (THERA-VIT-M) tablet 12/2/2020 at Unknown time  Yes Yes   Sig: Take 1 tablet by mouth daily   pregabalin (LYRICA) 100 MG capsule 12/2/2020 at Unknown time  No Yes   Sig: Take 1 capsule (100 mg) by  mouth 4 times daily   Patient taking differently: Take 100 mg by mouth 3 times daily    senna-docusate (SENOKOT-S/PERICOLACE) 8.6-50 MG tablet 12/2/2020 at Unknown time Self Yes Yes   Sig: Take 1 tablet by mouth daily Take 1 tablet in the morning and 2 tablets in the evening.    sitagliptin (JANUVIA) 100 MG tablet 12/2/2020 at Unknown time  No Yes   Sig: Take 1 tablet (100 mg) by mouth daily   tamsulosin (FLOMAX) 0.4 MG capsule 12/2/2020 at Unknown time Self No Yes   Sig: Take 1 capsule (0.4 mg) by mouth 2 times daily   triamterene-HCTZ (MAXZIDE-25) 37.5-25 MG tablet 12/2/2020 at Unknown time  No Yes   Sig: Take 1 tablet by mouth daily   vitamin C (ASCORBIC ACID) 1000 MG TABS 12/2/2020 at Unknown time Self Yes Yes   Sig: Take 1,000 mg by mouth daily      Facility-Administered Medications: None     Allergies   Allergies   Allergen Reactions     Cephalexin Diarrhea     Liraglutide Other (See Comments)     Sulfa Drugs Swelling     Pt has taken  Taken sulfa drugs orally without trouble. He had problems with Sulfa eye drops. Eye swelled up     Victoza      Increased migraine frequency and severity       Social History   I have reviewed this patient's social history and updated it with pertinent information if needed. Parmjit VILLEDA Betty  reports that he has never smoked. He has never used smokeless tobacco. He reports previous alcohol use. He reports previous drug use. Drugs: Cocaine, Marijuana, Methamphetamines, Opiates, IV, Amphetamines, Barbiturates, Benzodiazepines, Codeine, Fentanyl, Hashish, Hydrocodone, Hydromorphone, LSD, and Oxycodone.    Family History   I have reviewed this patient's family history and updated it with pertinent information if needed.   Family History   Problem Relation Age of Onset     Cerebrovascular Disease Mother      C.A.D. Mother      Hypertension Mother      Alcohol/Drug Mother      Arthritis Mother      Cerebrovascular Disease Maternal Grandmother      C.A.D. Maternal Grandmother       Alcohol/Drug Maternal Grandmother      C.A.D. Father      Heart Disease Father      Hypertension Father      Alcohol/Drug Father      Allergies Father      Circulatory Father      Depression Father      Respiratory Father      Asthma Father      Alcohol/Drug Paternal Grandfather      Cerebrovascular Disease Paternal Grandfather      Depression Son      Psychotic Disorder Son         anxiety disorder     Depression Daughter      Cerebrovascular Disease Maternal Grandfather      Cerebrovascular Disease Paternal Grandmother      Diabetes Brother      Allergies Sister      Depression Sister      Gynecology Sister        Review of Systems   The 10 point Review of Systems is negative other than noted in the HPI.     Physical Exam   Temp: 98.2  F (36.8  C) Temp src: Oral BP: 110/61 Pulse: 63   Resp: 14 SpO2: 95 % O2 Device: None (Room air)    Vital Signs with Ranges  Temp:  [97.9  F (36.6  C)-98.2  F (36.8  C)] 98.2  F (36.8  C)  Pulse:  [61-72] 63  Resp:  [8-19] 14  BP: ()/(48-63) 110/61  SpO2:  [91 %-99 %] 95 %  254 lbs 0 oz    GENERAL: alert, NAD  HEENT:  Normocephalic. No gross abnormalities.  Pupils equal.  MMM.  Dentition is ok.  CV: RRR, no murmurs, no clicks, gallops, or rubs, 1+ BLE edema, JVP not well seen  RESP: Clear bilaterally with good efforts  GI: Abdomen soft/nt/nd, BS normal. No masses, organomegaly  MUSCULOSKELETAL: extremities nl - no gross deformities noted  SKIN: dermatitis in skin folds low in abdomen.    NEURO:  Sleepy.    PSYCH: mood good, affect appropriate  LYMPH: No palpable ant/post cervical and supraclavicular adenopathy    Data   BMP  Recent Labs   Lab 12/03/20  1503 12/03/20  1134 12/03/20  0930 12/03/20  0622 11/30/20  1340   NA  --   --  138 137  --    POTASSIUM 7.2* 6.0* 6.6* 6.2*  --    CHLORIDE  --   --  114* 112*  --    LUIS  --   --  8.5 8.3*  --    CO2  --   --  19* 17*  --    BUN  --   --  47* 48*  --    CR  --   --  1.73* 1.84*  --    GLC  --   --  106* 113* 92      [email or web address](phos:4)  CBC)  Recent Labs   Lab 12/03/20  0622 11/27/20  0949   WBC 6.2 7.0   HGB 11.0* 12.6*   HCT 33.1* 37.9*   MCV 96 97   * 137*     Recent Labs   Lab 12/03/20  0930   AST 20   ALT 41   ALKPHOS 80   BILITOTAL 0.3

## 2020-12-03 NOTE — LETTER
[facility]  [address]  [facility] [address]    December 3, 2020    Parmjit Hernández  [address]   [facility] [address]    Dear Parmjit,  Your Care Team congratulates you on your journey to maintain wellness. This document will help guide you on your journey to maintain a healthy lifestyle.  You can use this to help you overcome any barriers you may encounter.  If you should have any questions or concerns, you can contact the members of your Care Team or contact your Primary Care Clinic for assistance.     Health Maintenance  Health Maintenance Reviewed:      My Access Plan  Medical Emergency 911   Primary Clinic Line [facility] - [phone]   24 Hour Appointment Line [phone] or  [phone]) (toll-free)   24 Hour Nurse Line [phone] (toll-free)   Preferred Urgent Care     Preferred Hospital     Preferred Pharmacy [id number] [address] [address] & [facility]     Behavioral Health Crisis Line The National Suicide Prevention Lifeline at 1-185.674.3618 or 911     My Care Team Members  Patient Care Team       Relationship Specialty Notifications Start End    Vince Henry MD PCP - General Family Practice  12/31/18     Phone: [phone] Fax: [phone] [address] [address]    Kuldeep Borrero MD MD Gastroenterology  1/11/16     Phone: [phone] Fax: [phone]         [address] [place] [address] [address] [facility] [address]    Vince Henry MD Assigned PCP   2/3/19     Phone: [phone] Fax: [phone] [address] [address]    Minda Smith, [facility] Pharmacist Pharmacist  4/22/19     Phone: [phone] Fax: [phone]         [address] [facility] [address]    Ang Rodrigues MD Assigned Sleep Provider   10/23/20     Phone: [phone] Fax: [phone] [address] [facility] [address]    Randell Montague MD Assigned Heart and Vascular Provider   11/15/20     Phone: [phone] Fax: [phone] [address] [place] [address]              Goals    None            It has been your Clinic Care Team's pleasure to work with you on your goals.    Regards,  Your Clinic Care Team

## 2020-12-03 NOTE — PROGRESS NOTES
Admission    Patient arrives to room 604-1 via cart from ER.  Care plan note: VSS, on RA. Denies pain. Cash sent down to security. Nursing will continue to monitor.     Inpatient nursing criteria listed below were met:    PCD's Documented: Yes  Skin issues/needs documented :Yes  Isolation education started/completed NA  Patient allergies verified with patient: Yes  Verified completion of [facility] Risk Assessment Tool:  Yes  Verified completion of Guardianship screening tool: Yes  Fall Prevention: Care plan updated, Education given and documented NA  Care Plan initiated: Yes  Home medications documented in belongings flowsheet: NA  Patient belongings documented in belongings flowsheet: Yes  Reminder note (belongings/ medications) placed in discharge instructions:Yes  Admission profile/ required documentation complete: Yes  Bedside Report Letter given and explained to patient NA  Visitor Designated? NA  If patient is a 72 hour hold/Commitment are belongings removed from room and locked up? NA

## 2020-12-03 NOTE — ED TRIAGE NOTES
EMS arrival:    Patient arrives via EMS.    Dx with HTN.  On 3 BP meds now.  2 weeks ago changes to meds.  Metoprolol increased to 2x per day. Lisinopril 10 mg to 20 mg.  Added hydrochlorothiazide again (25/37.5 combo).      Low BP this morning. 79/ systolic for him at home.     /64.  Lowest 82/38.    HR 70.  .    Loose stools 12 hours ago.  Denies blood in stools.    Covid test on Fri which was negative.     Pain pump in el. (arthritis in spine).  Infusing fentanyl & morphine.  Lypmphdema to lower extremities.   Wears compression hose.   250 ml NS IV given.  22G to left hand.

## 2020-12-03 NOTE — PROGRESS NOTES
RECEIVING UNIT ED HANDOFF REVIEW    ED Nurse Handoff Report was reviewed by: Petty Mcnair RN on December 3, 2020 at 10:26 AM

## 2020-12-03 NOTE — TELEPHONE ENCOUNTER
Patient calls back, he reports his blood pressure is not much better after almost 40 ounces of Gatorade he is at 89/67 and he states that now he has weakness and feels uneasy.    RN advised him per protocols that he needs to go to the ER and reschedule a refill on his pain pump. He has dropped >20mm Hg and with symptoms - the [facility] recommendation is to go to the ER. He agrees and will go now.    Selam Gonsales RN - [place] Nurse Advisor  12/3/2020      [1] Fall in systolic BP > 20 mm Hg from normal AND [2] dizzy, lightheaded, or weak   Reason: symptomatic decrease in blood pressure

## 2020-12-03 NOTE — ED PROVIDER NOTES
"  History     Chief Complaint:  Hypotension    HPI   Parmjit Hernández is a 65 year old male with a history of hypertension, NSTEMI, and diabetes who presents with hypotension. The patient called the nurses line because when he took his blood pressure, which he does daily, he found it to be low around 80/54. He notes he has had low blood pressure a few times in the last few days, and that he had changes to his medications a few weeks ago. He normally takes metoprolol twice daily instead of once per day. He has held his morning medications three times this week due to low blood pressure. He states he has been drinking lots of Gatorade, but feels \"off\". Additionally he had 4 episodes of loose stool in the past 12 hours. He had a 5 minute episode of light headedness around the time EMS came. EMS reports his blood pressure was initially 101/64, trended downward to 82/38, and increased to 125/56 after received 250 mL fluid. He has a pain pump in his back of fentanyl and morphine. He received a COVID-19 test which was negative on 11/27 which he needed as he got a lumbar puncture on 11/30. There has not been a recent fall or injury. He denies dizziness, difficulty breathing, nausea, blood in stool or urine, chest pain, abdominal pain, fever, cough, or vomiting.    Allergies:  Cephalexin  Liraglutide   Sulfa eye drops  Victoza    Medications:    Maxzide   Metoprolol   Lisinopril   Furosemide   Lovaza  Januvia   Metformin   Glimepiride   Tamsulosin   Imitrex  Modafinil   Depakote  Xanax  Vraylar  Intrathecal pain pump  Lyrica   Senna-docusate  Tumeric   Vitamin C    Past Medical History:    Non-ST elevation myocardial infarction   Hypertension  Hyperlipidemia   Diabetes mellitus   Polyneuropathy   Morbid obesity   Lymphedema   Amyloidosis  Restless leg syndrome  Obstructive sleep apnea   Narcotic dependence  Anxiety   Bipolar 1 disorder   Obsessive-compulsive disorder   Diverticulitis     Past Surgical History:    Abdominal " "hernia repair  Back surgery  Bone marrow biopsy x2  Cholecystectomy  EGD  Umbilical hernia repair  Repair deviated septum    Family History:    CAD, mother and father  Cerebrovascular disease  Alcohol/drug  Arthritis  Hypertension     Social History:  Smoking status: Never  Alcohol use: No  Patient presents alone.  PCP: Vince Henry    Marital Status:        Review of Systems   Constitutional: Negative for fever.   Respiratory: Negative for cough and shortness of breath.    Cardiovascular: Negative for chest pain.   Gastrointestinal: Positive for diarrhea. Negative for abdominal pain, blood in stool, nausea and vomiting.   Genitourinary: Negative for hematuria.   Neurological: Positive for light-headedness. Negative for dizziness.   All other systems reviewed and are negative.    Physical Exam     Patient Vitals for the past 24 hrs:   BP Temp Temp src Pulse Resp SpO2 Height Weight   12/03/20 0800 107/54 -- -- 65 8 93 % -- --   12/03/20 0745 (!) 85/48 -- -- 65 8 99 % -- --   12/03/20 0740 110/58 -- -- 65 8 99 % -- --   12/03/20 0730 93/55 -- -- 63 9 97 % -- --   12/03/20 0715 -- -- -- 61 8 96 % -- --   12/03/20 0700 (!) 82/51 -- -- 62 8 95 % -- --   12/03/20 0645 (!) 88/51 -- -- 64 10 91 % -- --   12/03/20 0630 92/56 -- -- 66 12 93 % -- --   12/03/20 0615 (!) 85/56 -- -- 69 19 96 % -- --   12/03/20 0613 109/56 97.9  F (36.6  C) Oral 69 14 96 % 1.702 m (5' 7\") 115.2 kg (254 lb)      Physical Exam  General: Sitting up in bed  Eyes:  The pupils are equal and round    Conjunctivae and sclerae are normal  ENT:    Wearing a mask  Neck:  Normal range of motion  CV:  Regular rate, regular rhythm     Skin warm and well perfused   Resp:  Non labored breathing on room air    No tachypnea    No cough heard  GI:  Abdomen is soft, there is no rigidity    No distension    No rebound tenderness     No abdominal tenderness  MS:  Normal muscular tone  Skin:  No rash or acute skin lesions noted  Neuro:   Awake, alert.  "     Speech is normal and fluent.    Face is symmetric.     Moves all extremities equally  Psych: Normal affect.  Appropriate interactions.    Emergency Department Course   ECG:  @ 0619  Indication: Hypotension  Vent. Rate 81 bpm. MO interval 202 ms. QRS duration 110 ms. QT/QTc 378/439 ms. P-R-T axis 38 -52 -8.   Sinus rhythm with occasional and consecutive premature ventricular complexes and fusion complexes. Left axis deviation. Incomplete right bundle branch block. Inferior infarct, age undetermined. Anterolateral infarct, age undetermined. Abnormal ECG.    Read @ 0626 by Dr. Duarte.     @ 1007  Indication: hypotension  Vent. Rate 67 bpm. MO interval 210 ms. QRS duration 68 ms. QT/QTc 380/401 ms. P-R-T axis 20 -47 -17.   Sinus rhythm with 1st degree AV block. Left axis deviation. Inferior infarct, age undetermined. Anterior infarct, age undetermined. Abnormal ECG.     Read @ 1013 by Dr. Duarte.     Laboratory:  CBC: WBC 6.2, HGB 11.0 (L),  (L)     0622 BMP: Glucose 113 (H), Potassium: 6.2 (H), Chloride: 112 (H), Carbon dioxide: 17 (L), Urea nitrogen: 48 (H), Creatinine: 1.84 (H), GFR: 38 (L), Calcium: 8.3 (L), o/w WNL    0930 BMP: Glucose 106 (H), Potassium: 6.6 (H), Chloride: 114 (H), Urea nitrogen: 47 (H), Creatinine: 1.73 (H), GFR: 41 (L), o/w WNL      0708 Glucose by meter: 100 (H)    Blood gas venous and oxyhgb: pH 7.25 (L) PCO2 44 PO2 56 (H) Bicarbonate 19 (L) Oxyhemoglobin 84 Base Deficit 7.9.        Magnesium: 2.3    UA: Clear yellow urine, Mucous: present, Hyaline casts: present, otherwise WNL     Asymptomatic COVID-19 Virus by PCR: In process      Interventions:  0635 0.9% Sodium Chloride BOLUS 500 mLs IV    0713 0.9% Sodium Chloride BOLUS 500 mLs IV    0715 Albuterol inhaler 6 puffs inhalation given  0723 Dextrose 50% injection 25 g IV  0725 Insulin regular 1 unit/mL 5 unit injections IV  0725 Calcium gluconate 1 g in D5W 100 mL IV  0759 Dextrose 10% infusion IV  0930 Sodium chloride 0.9%  infusion IV  1009 0.9% Sodium Chloride BOLUS 1000 mLs IV    1026 Dextrose 50% injection 25 g IV  1032 Insulin regular 1 unit/mL, 10 units given IV  1047 Calcium gluconate 1 g in D5W 100 mL IV  1059 Sodium bicarbonate 8.4% injection 50 mEq     Emergency Department Course:  0602 Nursing notes and vitals reviewed. I performed an exam of the patient as documented above.     EKG was done, interpretation as above.    IV inserted. Medicine administered as documented above. Blood drawn. This was sent to the lab for further testing, results above.    The patient provided a urine sample here in the emergency department. This was sent for laboratory testing, findings above.      0630 I rechecked the patient and discussed the results of his workup thus far.     0735  I consulted with Dr. Blake of the hospitalist services. They are in agreement to accept the patient for admission.    Findings and plan explained to the Patient who consents to admission. Discussed the patient with Dr. Blake, who will admit the patient to a [facility] bed for further monitoring, evaluation, and treatment.       Impression & Plan    Covid-19  Parmjit Hernández was evaluated during a global COVID-19 pandemic, which necessitated consideration that the patient might be at risk for infection with the SARS-CoV-2 virus that causes COVID-19.   Applicable protocols for evaluation were followed during the patient's care.   COVID-19 was considered as part of the patient's evaluation. The plan for testing is:  a test was obtained during this visit.     Medical Decision Making:  Parmjit Hernández is a 65 year old male who presented to the ED with hypotension. Patient has had several medication changes in last month regarding his blood pressure medications. Has had to hold his morning medications probably about 3 times in last week because of low blood pressure readings. He also had a few looser stools in last 12 hours and a few days ago. In combination, these seem to  be most likely cause of his hypotension. Suspect that he is now on too many blood pressure medications. Labs show mild anemia. No signs of active bleeding. Similar to prior hemoglobin in past. BMP shows KWABENA and hyperkalemia. Suspect KWABENA from hypotension, mild hypovolemia. Given IV fluids. Hyperkalemia treatment ordered. Will admit and discussed with hospitalist. No chest pain or shortness of breath to suggest ACS. Had some looser stool but no abdominal tenderness to warrant abdominal imaging. Unlikely covid so asymptomatic covid swab ordered. BP improved with IV fluids. Discussed with nephrology after repeat potassium had increased. Recommended repeating medications given and starting 1/2NS with 75 meq bicarb.    Diagnosis:    ICD-10-CM    1. Hypotension, unspecified hypotension type  I95.9 CBC with platelets differential     Basic metabolic panel     Blood gas venous and oxyhgb     UA with Microscopic     Magnesium     Magnesium     Glucose by meter     Glucose by meter     Asymptomatic COVID-19 Virus (Coronavirus) by PCR     Basic metabolic panel     Glucose by meter     Glucose by meter     CANCELED: Potassium     CANCELED: Magnesium     CANCELED: Potassium   2. Hyperkalemia  E87.5    3. Acute kidney injury (H)  N17.9        Disposition:  Admitted to Dr. Hayden Aden  12/3/2020   [facility] EMERGENCY DEPT    Scribe Disclosure:  I, Harmony Aden, am serving as a scribe at 6:02 AM on 12/3/2020 to document services personally performed by Rebecca Duarte MD found based on my observations and the provider's statements to me.         Rebecca Duarte MD  12/03/20 [id number]

## 2020-12-03 NOTE — ED NOTES
DATE:  12/3/2020   TIME OF RECEIPT FROM LAB:  0645  LAB TEST:   Potassium  LAB VALUE:   6.2  RESULTS GIVEN WITH READ-BACK TO (PROVIDER):  Rebecca Duarte, *  TIME LAB VALUE REPORTED TO PROVIDER:   0647

## 2020-12-03 NOTE — TELEPHONE ENCOUNTER
"66 y/o male calls about hypotension, woke up from sleeping and checked pressure with home machine 77/59, now 81/66 - RN advised him that systaltic pressures <80 with or without symptoms, he needs to go to the ER. He states he was told by PCP to drink fluids to raise blood pressure, he is drinking Gatorade. RN advised him that this is a very low blood pressure and might not be one he can drink back to normal levels, may need IV fluids. We agreed provider has not seen his blood pressure this low. *Also, protocols indicate a drop >20mm hg with symptoms leads to the ER - since he also meets that criteria since his normal B/P is 130's over/70-80's this also places him in the ER,     He declines ER, he feels he will get stuck there and not be able to male his appointment for refill on his Pain pump filled,.    RN stressed the importance of acting quick on this, he wants to try to drink Gatorade again for another hour, RN asked him to call back and we can reevaluate at the time     *Call Back in one hour - with B/P    Selam Gonsales RN - [place] Nurse Advisor  12/3/2020   2:54AM    Additional Information    Negative: Started suddenly after an allergic medicine, an allergic food, or bee sting    Negative: Shock suspected (e.g., cold/pale/clammy skin, too weak to stand, low BP, rapid pulse)    Negative: Difficult to awaken or acting confused (e.g., disoriented, slurred speech)    Negative: Fainted    Negative: [1] Systolic BP < 90 AND [2] dizzy, lightheaded, or weak    Negative: Chest pain    Negative: Bleeding (e.g., vomiting blood, rectal bleeding or tarry stools, severe vaginal bleeding)(Exception: fainted from sight of small amount of blood; small cut or abrasion)    Negative: Extra heart beats or heart is beating fast  (i.e., \"palpitations\")    Negative: Sounds like a life-threatening emergency to the triager    Negative: [1] Systolic BP < 80 AND [2] NOT dizzy, lightheaded or weak    Negative: Abdominal pain    Negative: " Fever > 100.5 F (38.1 C)    Negative: Major surgery in the past month    Negative: [1] Drinking very little AND [2] dehydration suspected (e.g., no urine > 12 hours, very dry mouth, very lightheaded)    Negative: [1] Fall in systolic BP > 20 mm Hg from normal AND [2] dizzy, lightheaded, or weak    Negative: Patient sounds very sick or weak to the triager    Negative: [1] Systolic BP  AND [2] taking blood pressure medications AND [3] dizzy, lightheaded or weak    Negative: [1] Systolic BP < 90 AND [2] NOT dizzy, lightheaded or weak    Protocols used: LOW BLOOD PRESSURE-A-AH    COVID 19 Nurse Triage Plan/Patient Instructions    Please be aware that novel coronavirus (COVID-19) may be circulating in the community. If you develop symptoms such as fever, cough, or SOB or if you have concerns about the presence of another infection including coronavirus (COVID-19), please contact your health care provider or visit [email or web address].     Disposition/Instructions    Home care recommended. Follow home care protocol based instructions.    Thank you for taking steps to prevent the spread of this virus.  o Limit your contact with others.  o Wear a simple mask to cover your cough.  o Wash your hands well and often.    Resources    [facility]: About COVID-19: [email or web address]    CDC: What to Do If You're Sick: www.cdc.gov/coronavirus/2019-ncov/about/steps-when-sick.html    CDC: Ending Home Isolation: www.cdc.gov/coronavirus/2019-ncov/hcp/disposition-in-home-patients.html     CDC: Caring for Someone: www.cdc.gov/coronavirus/2019-ncov/if-you-are-sick/care-for-someone.html     [facility]: Interim Guidance for Hospital Discharge to Home: www.health.[facility].[place]/diseases/coronavirus/hcp/hospdischarge.pdf    [facility] clinical trials (COVID-19 research studies): [email or web address]     Below are the COVID-19 hotlines at the [place] Department of [facility] ([facility]). Interpreters are  available.   o For health questions: Call 759-874-5585 or 1-682.799.1610 (7 a.m. to 7 p.m.)  o For questions about schools and childcare: Call 945-735-4488 or 1-258.350.7298 (7 a.m. to 7 p.m.)

## 2020-12-03 NOTE — ED NOTES
Bed: ED25  Expected date:   Expected time:   Means of arrival:   Comments:  711 66M Dizzy  ETA [id number]

## 2020-12-03 NOTE — H&P
[facility]    History and Physical - Hospitalist Service       Date of Admission:  12/3/2020    Assessment & Plan   Parmjit Hernández is a 65 year old male with past medical history significant for Type II DM, hypertension hyperlipidemia, NORMA, chronic pain, and possible Guillain Barré syndrome admitted on 12/3/2020 with lightheadedness and hypotension.    Lightheadedness   Hypotension   Hx of HTN   Pt presented to the ED today after noticing his blood pressure at home was low. He has had intermittent low readings over the past few days. His BP this morning was 80/54. He reports associated lightheadedness. His PTA medications include lisinopril 20mg daily, metoprolol succinate 50 mg BID, and triamterene-hydrochlorothiazide 3.75-25mg daily. Hypotension likely related to too much antihypertensive medications - recently added maxzide and increased lisinopril dose following hospitalization for NSTEMI. He does also endorse some loose stools over the past day which could be contributing, though pt denies significant diarrhea, and is eating and drinking normally. Low suspicion for underlying infectious etiology. Recent TTE in Nov 2020 was unremarkable. Of note, he is also undergoing work up for Guillain [place] syndrome as outpatient through [facility] neurology. Pt thinks that BP lability may be 2/2 to this.   - Received 3L of fluids in the ED   - Hold all BP medications for now, consider resuming at lower doses in the next couple days if BP improves    - Consider additional GI work-up if diarrhea persists.     KWABENA   Hyperkalemia   Non anion gap metabolic acidosis   Creatinine elevated to 1.85 (baseline creatinine is around 1.05), Bicarb 17, pH 5.27, Potassium 6.2 on admission. Received insulin with dextrose x2, calcium gluconate, albuterol, and sodium bicarb in the ED. Likely related to KWABENA and medications as noted above and GI losses.   - Telemetry   - Close monitoring of potassium   - Hold lisinopril and  maxzide   - Low potassium diet   - Continue maintenance fluids with sodium bicarb   - Nephrology consulted    Amyloidosis   S/p autologous stem cell transplant 2016   Follows with [facility]. Last follow-up was 2019, with normal immunoglobulin free light chains and negative SPEP. He had no evidence of infiltrative cardiomyopathy. Less likely that this is contributing to above presentation or renal failure, but if there is no improvement, could consider additional work-up.     Type II DM   PTA medications include Stagliptin, Metformin   - Hold PTA medications  - Check lactate to rule out metformin toxicity   - Holding sliding scale for now given need for shifting potassium     Liver cirrhosis   Splenomegaly   - Add on LFTs    Mild chronic thrombocytopenia    Normocytic anemia 2/2 chronic disease and liver cirrhosis   - Stable, continue to monitor     CAD   Hx of NSTEMI   Hospitalized in Mid November for chest pain, elevated BP and troponin elevation. Treated for NSTEMI. Cardiology evaluated and recommended BP control. Added Maxzide, increased lisinopril dose.    - Continue ASA and atorvastatin when verified     Peripheral neuropathy   Polyneuropathy   ?Guillain [place] Syndrome, mild  Has been endorsing significant weakness over the past few months, has been hospitalized for this a few times. Has undergone extensive work-up with Unclear etiology. Follows with [facility] with Dr. Miles. He has been undergoing outpatient work-up for Guillain-[place]. Per his report his symptoms have been fairly stable.   - Could consider neurology consult inpatient if BP continues to be difficult to control (could be some autonomic component), or neurologic symptoms progress      Chronic back pain   Followed by [facility] pain clinic. Had prior surgery which did not help the pain. Has a pain pump with fentanyl, bupivacaine and morphine.   - Takes pregabalin 100mg TID when verified     Bipolar   Anxiety/depression   Takes  Depakote, alprazolam, and Vraylar.   - Continue PTA medications when verified        Diet: Renal Diet (non-dialysis)  DVT Prophylaxis: Pneumatic Compression Devices  Parker Catheter: not present  Code Status: Full Code         Disposition Plan   Expected discharge: 2 - 3 days, recommended to prior living arrangement once renal function improved and electrolytes stable. .  Entered: Sarah Blake MD 12/03/2020, 10:39 AM     The patient's care was discussed with the Patient.    Sarah Blake MD  [facility]  Contact information available via [facility] Paging/Directory      ______________________________________________________________________    Chief Complaint   Low blood pressure     History is obtained from the patient, chart review     History of Present Illness   Parmjit Hernández is a 65 year old male who presented to the ED today with low blood pressure readings at home. He has noticed intermittent low blood pressures for the past several days. He endorsed some associated light-headedness to the ED provider, but denied any symptoms when I questioned him. He has had some normal to slightly elevated BPs at home as well. He was recently admitted in mid-November for chest pain and hypertension. His anti-hypertensive medications were increased at this time. He has noted some loose stools over the course of the last 24 hours, but no ela diarrhea. He has been eating and drinking normally. He has been urinating normally. He does note some baseline slow flow and hesitancy, but nothing has acutely changed. He otherwise is feeling ok. Just tired from being up most of the night. He denies fever, chills, cough shortness of breath or chest pain. Denies any over the counter medication use.      Review of Systems    The 10 point Review of Systems is negative other than noted in the HPI or here.    Past Medical History    I have reviewed this patient's medical history and updated it with pertinent  information if needed.   Past Medical History:   Diagnosis Date     Acquired lymphedema 2/4/2019     Allergic state      Amyloidosis (H)     followed by hematology Dr. Romeo status post peripheral stem cell transplant     Anxiety 5/11/2016     Anxiety and depression 12/18/2013     Bipolar I disorder (H) 9/1/2015    Under care of psychiatrist [facility]- Dr Rahman doxepin 25 mg, 2 cap bedtime DULoxetine 20 mg once daily  OLANZapine 7.5 mg  1 tab bedtime      Depressive disorder 1995     Diabetes mellitus (H)     type 2     EKG abnormality 4/20/2020     Guillain Barré syndrome (H)      H/O bone marrow transplant (H)      Headache(784.0)      History of diverticulitis 1/27/2015    1/15-rxed with antibiotics      Hyperlipidemia LDL goal <100 10/31/2010     Hypertension 2007     Hypertension goal BP (blood pressure) < 140/90 10/11/2011     Marianne (H) 8/20/2015     Morbid obesity (H) 8/28/2018     Myalgia and myositis, unspecified      Narcotic dependence (H) 9/6/2016    None in about 6 months- 8/1/17     NSTEMI (non-ST elevated myocardial infarction) (H) 04/19/2020     Obsessive-compulsive disorders      Obstructive sleep apnea 7/16/2008     OCD (obsessive compulsive disorder)      Other acquired absence of organ 94     Other cirrhosis of liver (H) possibly from vences  4/15/2020     Other specified viral warts      Pain in the abdomen      Peripheral edema 5/20/2018    Noncardiac Continue with  furosemide (LASIX) 20 MG tablet,  potassium       chloride SA (K-DUR/KLOR-CON M) 10 MEQ CR  Tab once daily  Basic metabolic panel     Polyneuropathy associated with underlying disease (H) 2/4/2019     Pure hypercholesterolemia      Renal failure 5/10/2019     Restless legs syndrome (RLS) 6/29/2017     Sleep apnea      Stasis dermatitis of both legs 12/31/2018     Type 2 diabetes mellitus with other specified complication (H) 7/10/2017       Past Surgical History   I have reviewed this patient's surgical history and updated it with  pertinent information if needed.  Past Surgical History:   Procedure Laterality Date     ABDOMEN SURGERY  2007    abdominal hernia     BACK SURGERY  8/6/2020     BIOPSY  june 2015    negative     BMT PROTOCOL      for systemic amyloidosis     BONE MARROW BIOPSY, BONE SPECIMEN, NEEDLE/TROCAR N/A 6/8/2016    Procedure: BIOPSY BONE MARROW;  Surgeon: Nathan Agrawal MD;  Location:  GI     BONE MARROW BIOPSY, BONE SPECIMEN, NEEDLE/TROCAR N/A 2/20/2019    Procedure: BIOPSY BONE MARROW;  Surgeon: Michael Raygoza MD;  Location:  GI     CHOLECYSTECTOMY  1995    lap qian     COLONOSCOPY  2012    hx polyps     ESOPHAGOSCOPY, GASTROSCOPY, DUODENOSCOPY (EGD), COMBINED N/A 5/29/2015    Procedure: COMBINED ESOPHAGOSCOPY, GASTROSCOPY, DUODENOSCOPY (EGD), BIOPSY SINGLE OR MULTIPLE;  Surgeon: John Jacob MD;  Location:  GI     HERNIA REPAIR, UMBILICAL  2006     [facility] NONSPECIFIC PROCEDURE  94    Cholecystectomy     [facility] NONSPECIFIC PROCEDURE  2000    repair deviated septum       Social History   I have reviewed this patient's social history and updated it with pertinent information if needed.  Social History     Tobacco Use     Smoking status: Never Smoker     Smokeless tobacco: Never Used   Substance Use Topics     Alcohol use: Not Currently     Alcohol/week: 0.0 standard drinks     Drug use: Not Currently     Types: Cocaine, Marijuana, Methamphetamines, Opiates, IV, Amphetamines, Barbiturates, Benzodiazepines, Codeine, Fentanyl, Hashish, Hydrocodone, Hydromorphone, LSD, Oxycodone       Family History   I have reviewed this patient's family history and updated it with pertinent information if needed.  Family History   Problem Relation Age of Onset     Cerebrovascular Disease Mother      C.A.D. Mother      Hypertension Mother      Alcohol/Drug Mother      Arthritis Mother      Cerebrovascular Disease Maternal Grandmother      C.A.D. Maternal Grandmother      Alcohol/Drug Maternal Grandmother       C.A.D. Father      Heart Disease Father      Hypertension Father      Alcohol/Drug Father      Allergies Father      Circulatory Father      Depression Father      Respiratory Father      Asthma Father      Alcohol/Drug Paternal Grandfather      Cerebrovascular Disease Paternal Grandfather      Depression Son      Psychotic Disorder Son         anxiety disorder     Depression Daughter      Cerebrovascular Disease Maternal Grandfather      Cerebrovascular Disease Paternal Grandmother      Diabetes Brother      Allergies Sister      Depression Sister      Gynecology Sister        Prior to Admission Medications   Prior to Admission Medications   Prescriptions Last Dose Informant Patient Reported? Taking?   ALPRAZolam (XANAX) 0.5 MG tablet 12/2/2020 at Unknown time Self Yes Yes   Sig: Take 0.5 mg by mouth At Bedtime   NONFORMULARY 12/3/2020 at Unknown time Self Yes Yes   Sig: by Intrathecal route continuous - + up to 4 boluses daily (100mcg each bolus usually once or 2x per day)    Managed by [facility]     Medications in Pump:  fentanyl 2000mcg/mL  Bupivacaine 20mg/mL  Morphine 5.8mg/mL     Rate:   Fentanyl 1200mcg/day  Bupivacaine 12mg/day  Morphine 4.2mg/day  Pump Last Fill Date:  09/2020   SUMAtriptan (IMITREX) 50 MG tablet  Self No Yes   Sig: Take 1 tablet (50 mg) by mouth at onset of headache for migraine May repeat in 2 hours. Max 4 tablets/24 hours.   TURMERIC PO   Yes Yes   Sig: Take 500 mg by mouth   Turmeric 500 MG CAPS 12/2/2020 at Unknown time Self Yes Yes   Sig: Take 1,200 mg by mouth daily    acetaminophen (TYLENOL) 325 MG tablet  Self No Yes   Sig: Take 2 tablets (650 mg) by mouth every 4 hours as needed for mild pain   alpha-lipoic acid 100 MG capsule 12/2/2020 at Unknown time Self Yes Yes   Sig: Take 200 mg by mouth daily   aspirin (ASPIRIN LOW DOSE) 81 MG tablet 12/2/2020 at Unknown time Self No Yes   Sig: Take 1 tablet (81 mg) by mouth daily   atorvastatin (LIPITOR) 20 MG  tablet 12/2/2020 at Unknown time Self No Yes   Sig: Take 1 tablet (20 mg) by mouth daily   cariprazine (VRAYLAR) 3 MG CAPS capsule 12/2/2020 at Unknown time Self Yes Yes   Sig: Take 3 mg by mouth daily    divalproex sodium delayed-release (DEPAKOTE) 500 MG DR tablet 12/2/2020 at Unknown time Self Yes Yes   Sig: Take 500 mg by mouth At Bedtime    lisinopril (ZESTRIL) 20 MG tablet 12/2/2020 at Unknown time  No Yes   Sig: Take 1 tablet (20 mg) by mouth daily   metFORMIN (GLUCOPHAGE-XR) 500 MG 24 hr tablet 12/2/2020 at Unknown time Self No Yes   Sig: Take 1000mg in AM and 1000mg in PM   metoprolol succinate ER (TOPROL-XL) 50 MG 24 hr tablet 12/2/2020 at Unknown time  Yes Yes   Sig: Take 50 mg by mouth 2 times daily   multivitamin w/minerals (THERA-VIT-M) tablet 12/2/2020 at Unknown time  Yes Yes   Sig: Take 1 tablet by mouth daily   pregabalin (LYRICA) 100 MG capsule 12/2/2020 at Unknown time  No Yes   Sig: Take 1 capsule (100 mg) by mouth 4 times daily   Patient taking differently: Take 100 mg by mouth 3 times daily    senna-docusate (SENOKOT-S/PERICOLACE) 8.6-50 MG tablet 12/2/2020 at Unknown time Self Yes Yes   Sig: Take 1 tablet by mouth daily Take 1 tablet in the morning and 2 tablets in the evening.    sitagliptin (JANUVIA) 100 MG tablet 12/2/2020 at Unknown time  No Yes   Sig: Take 1 tablet (100 mg) by mouth daily   tamsulosin (FLOMAX) 0.4 MG capsule 12/2/2020 at Unknown time Self No Yes   Sig: Take 1 capsule (0.4 mg) by mouth 2 times daily   triamterene-HCTZ (MAXZIDE-25) 37.5-25 MG tablet 12/2/2020 at Unknown time  No Yes   Sig: Take 1 tablet by mouth daily   vitamin C (ASCORBIC ACID) 1000 MG TABS 12/2/2020 at Unknown time Self Yes Yes   Sig: Take 1,000 mg by mouth daily      Facility-Administered Medications: None     Allergies   Allergies   Allergen Reactions     Cephalexin Diarrhea     Liraglutide Other (See Comments)     Sulfa Drugs Swelling     Pt has taken  Taken sulfa drugs orally without trouble. He had  problems with Sulfa eye drops. Eye swelled up     Victoza      Increased migraine frequency and severity       Physical Exam   Vital Signs: Temp: 97.9  F (36.6  C) Temp src: Oral BP: 107/54 Pulse: 65   Resp: 8 SpO2: 93 % O2 Device: None (Room air)    Weight: 254 lbs 0 oz    General Appearance: awake, alert, NAD, does appear sleepy, intermittently doses off   Eyes: Pupils equal and round, EOMI, sclera white   Respiratory: no increased work of breathing   Cardiovascular: regular rate   GI: obese,   Lymph/Hematologic: compression stockings on bilaterally   Skin: no rashes or lesions noted   Musculoskeletal: no abnormalities   Neurologic: CN II-XII intact, moves all extremities, steady gait   Psychiatric: pleasant mood and affect     Data   Data reviewed today: I reviewed all medications, new labs and imaging results over the last 24 hours. I personally reviewed the EKG tracing.    Recent Labs   Lab 12/03/20  1134 12/03/20  0930 12/03/20  0622 11/30/20  1340 11/27/20  0949   WBC  --   --  6.2  --  7.0   HGB  --   --  11.0*  --  12.6*   MCV  --   --  96  --  97   PLT  --   --  126*  --  137*   NA  --  138 137  --   --    POTASSIUM 6.0* 6.6* 6.2*  --   --    CHLORIDE  --  114* 112*  --   --    CO2  --  19* 17*  --   --    BUN  --  47* 48*  --   --    CR  --  1.73* 1.84*  --   --    ANIONGAP  --  5 8  --   --    LUIS  --  8.5 8.3*  --   --    GLC  --  106* 113* 92  --    ALBUMIN  --  3.3*  --   --   --    PROTTOTAL  --  5.8*  --   --   --    BILITOTAL  --  0.3  --   --   --    ALKPHOS  --  80  --   --   --    ALT  --  41  --   --   --    AST  --  20  --   --   --      No results found for this or any previous visit (from the past 24 hour(s)).

## 2020-12-03 NOTE — PROGRESS NOTES
Clinic Care Coordination Contact    Assessment:    Per chart review, CHW contacted patient for 2 month follow up.  No new goals were identified.  Pt aware that PCA services not available through his [employer] insurance and would be private pay.  Pt can be assessed for services through the [facility] once he turns 66.     It is noted that pt in ED today and has frequent admissions. However, patient declined further outreach assistance or goal setting with CC team.    Enrollment status: Graduated     Plan: No further outreaches at this time.  If pt willing to work with Care Coordination team again in the future PCP to place new referral.

## 2020-12-03 NOTE — TELEPHONE ENCOUNTER
Message left for patient to schedule return visits as per Dr Zepeda 11*30*20 discharge instructions:    1. Labs and ultrasound abdomen in 6 months.  2. Follow-up in 6 months.

## 2020-12-03 NOTE — ED NOTES
[facility]  ED Nurse Handoff Report    ED Chief complaint: Hypotension      ED Diagnosis:   Final diagnoses:   Hypotension, unspecified hypotension type   Hyperkalemia   Acute kidney injury (H)       Code Status: Full Code    Allergies:   Allergies   Allergen Reactions     Cephalexin Diarrhea     Liraglutide Other (See Comments)     Sulfa Drugs Swelling     Pt has taken  Taken sulfa drugs orally without trouble. He had problems with Sulfa eye drops. Eye swelled up     Victoza      Increased migraine frequency and severity       Patient Story: Patient came from home. Had his blood pressure medications changed 2 weeks ago and is now hypotensive. Also noted to have KWABENA and potassium of 6.5.   Focused Assessment:    Neuro: WDL, Up independently  Cards: Soft BP, On cardiac monitor. SR   Pulm: WDL  GI: Loose stools at home.   Pain: Chronic pain pump for arthritis.      Treatments and/or interventions provided: medications  Patient's response to treatments and/or interventions: one    To be done/followed up on inpatient unit:      Does this patient have any cognitive concerns?: None    Activity level - Baseline/Home:  Independent  Activity Level - Current:   Independent    Patient's Preferred language: English   Needed?: No    Isolation: None  Infection: Not Applicable  Patient tested for COVID 19 prior to admission: YES  Bariatric?: No    Vital Signs:   Vitals:    12/03/20 0630 12/03/20 0645 12/03/20 0700 12/03/20 0715   BP: 92/56 (!) 88/51 (!) 82/51    Pulse: 66 64 62 61   Resp: 12 10 8 8   Temp:       TempSrc:       SpO2: 93% 91% 95% 96%   Weight:       Height:           Cardiac Rhythm:     Was the PSS-3 completed:   Yes  What interventions are required if any?               Family Comments:   OBS brochure/video discussed/provided to patient/family: N/A              Name of person given brochure if not patient:               Relationship to patient:     For the majority of the shift this  patient's behavior was Green.   Behavioral interventions performed were one.    ED NURSE PHONE NUMBER: *[id number] [phone]

## 2020-12-03 NOTE — PLAN OF CARE
Summary:  Hypotension  DATE & TIME: 12/3/2020 8073-3237   Cognitive Concerns/ Orientation : A+Ox4   BEHAVIOR & AGGRESSION TOOL COLOR: Green  CIWA SCORE: NA   ABNL VS/O2: VSS, on RA.   MOBILITY: Up independently in room.  PAIN MANAGMENT: Implanted pain pump (morphine/Fentanyl)  DIET: Renal Diet. Tolerating well, good appetite.  BOWEL/BLADDER: Continent.  ABNL LAB/BG: K+ 6.0, recheck @ 1400. .  DRAIN/DEVICES: NaCl w/ 75meq NaBicarb @ 100.  TELEMETRY RHYTHM: NSR.  SKIN: Blanchable redness to coccyx. BLE rosaura/red dry.  TESTS/PROCEDURES: Nephrology consult pending.  D/C DAY/GOALS/PLACE: Discharge to home pending.   OTHER IMPORTANT INFO: Neph following. Nursing will continue to monitor.     1500: Pt expressed concern regarding mild word switching. Pt switched word clock for car. Full set of Neuros completed, WNL. K+ came back 7.2, MD notified.

## 2020-12-04 VITALS
HEART RATE: 63 BPM | OXYGEN SATURATION: 98 % | RESPIRATION RATE: 16 BRPM | BODY MASS INDEX: 39.87 KG/M2 | HEIGHT: 67 IN | WEIGHT: 254 LBS | SYSTOLIC BLOOD PRESSURE: 134 MMHG | TEMPERATURE: 97.4 F | DIASTOLIC BLOOD PRESSURE: 78 MMHG

## 2020-12-04 LAB
ANION GAP SERPL CALCULATED.3IONS-SCNC: 6 MMOL/L (ref 3–14)
BUN SERPL-MCNC: 28 MG/DL (ref 7–30)
CALCIUM SERPL-MCNC: 8.4 MG/DL (ref 8.5–10.1)
CHLORIDE SERPL-SCNC: 110 MMOL/L (ref 94–109)
CO2 SERPL-SCNC: 24 MMOL/L (ref 20–32)
CREAT SERPL-MCNC: 1.16 MG/DL (ref 0.66–1.25)
ERYTHROCYTE [DISTWIDTH] IN BLOOD BY AUTOMATED COUNT: 13.8 % (ref 10–15)
GFR SERPL CREATININE-BSD FRML MDRD: 66 ML/MIN/{1.73_M2}
GLUCOSE BLDC GLUCOMTR-MCNC: 107 MG/DL (ref 70–99)
GLUCOSE BLDC GLUCOMTR-MCNC: 117 MG/DL (ref 70–99)
GLUCOSE BLDC GLUCOMTR-MCNC: 76 MG/DL (ref 70–99)
GLUCOSE BLDC GLUCOMTR-MCNC: 97 MG/DL (ref 70–99)
GLUCOSE SERPL-MCNC: 133 MG/DL (ref 70–99)
HCT VFR BLD AUTO: 34.2 % (ref 40–53)
HGB BLD-MCNC: 11.6 G/DL (ref 13.3–17.7)
MCH RBC QN AUTO: 32.5 PG (ref 26.5–33)
MCHC RBC AUTO-ENTMCNC: 33.9 G/DL (ref 31.5–36.5)
MCV RBC AUTO: 96 FL (ref 78–100)
PLATELET # BLD AUTO: 101 10E9/L (ref 150–450)
POTASSIUM SERPL-SCNC: 5.3 MMOL/L (ref 3.4–5.3)
RBC # BLD AUTO: 3.57 10E12/L (ref 4.4–5.9)
SARS-COV-2 RNA SPEC QL NAA+PROBE: NOT DETECTED
SODIUM SERPL-SCNC: 140 MMOL/L (ref 133–144)
SPECIMEN SOURCE: NORMAL
WBC # BLD AUTO: 4.2 10E9/L (ref 4–11)

## 2020-12-04 PROCEDURE — 250N000009 HC RX 250: Performed by: STUDENT IN AN ORGANIZED HEALTH CARE EDUCATION/TRAINING PROGRAM

## 2020-12-04 PROCEDURE — 258N000002 HC RX IP 258 OP 250: Performed by: STUDENT IN AN ORGANIZED HEALTH CARE EDUCATION/TRAINING PROGRAM

## 2020-12-04 PROCEDURE — 999N001017 HC STATISTIC GLUCOSE BY METER IP

## 2020-12-04 PROCEDURE — 99239 HOSP IP/OBS DSCHRG MGMT >30: CPT | Performed by: INTERNAL MEDICINE

## 2020-12-04 PROCEDURE — 85027 COMPLETE CBC AUTOMATED: CPT | Performed by: INTERNAL MEDICINE

## 2020-12-04 PROCEDURE — 80048 BASIC METABOLIC PNL TOTAL CA: CPT | Performed by: INTERNAL MEDICINE

## 2020-12-04 PROCEDURE — 250N000013 HC RX MED GY IP 250 OP 250 PS 637: Performed by: STUDENT IN AN ORGANIZED HEALTH CARE EDUCATION/TRAINING PROGRAM

## 2020-12-04 PROCEDURE — 36415 COLL VENOUS BLD VENIPUNCTURE: CPT | Performed by: INTERNAL MEDICINE

## 2020-12-04 RX ORDER — METOPROLOL SUCCINATE 50 MG/1
50 TABLET, EXTENDED RELEASE ORAL DAILY
Start: 2020-12-04 | End: 2021-02-03

## 2020-12-04 RX ADMIN — ATORVASTATIN CALCIUM 20 MG: 20 TABLET, FILM COATED ORAL at 07:53

## 2020-12-04 RX ADMIN — SODIUM BICARBONATE: 84 INJECTION, SOLUTION INTRAVENOUS at 01:09

## 2020-12-04 RX ADMIN — CARIPRAZINE 3 MG: 1.5 CAPSULE, GELATIN COATED ORAL at 07:53

## 2020-12-04 RX ADMIN — ACETAMINOPHEN 650 MG: 325 TABLET, FILM COATED ORAL at 07:53

## 2020-12-04 RX ADMIN — SODIUM BICARBONATE: 84 INJECTION, SOLUTION INTRAVENOUS at 11:53

## 2020-12-04 RX ADMIN — PREGABALIN 100 MG: 100 CAPSULE ORAL at 07:52

## 2020-12-04 ASSESSMENT — ACTIVITIES OF DAILY LIVING (ADL)
ADLS_ACUITY_SCORE: 13

## 2020-12-04 NOTE — PLAN OF CARE
Cognitive Concerns/ Orientation : Pt A/Ox4   BEHAVIOR & AGGRESSION TOOL COLOR: Green   ABNL VS/O2: VSS on RA.   MOBILITY: Ind  PAIN MANAGMENT: c/o headache-tylenol given-effective  DIET: Renal-good appetite  BOWEL/BLADDER: Continent, no BM tonight  ABNL LAB/BG: K 5.3, Cr 1.16  DRAIN/DEVICES: IVF sodium bicarb infusing.  TELEMETRY RHYTHM: SR   SKIN: bruised.  TESTS/PROCEDURES: Nephrology following  D/C DAY/GOALS/PLACE: Discharge pending progress  OTHER IMPORTANT INFO: calls frequently, anxious to go home. denies dizziness. No stools.

## 2020-12-04 NOTE — PROGRESS NOTES
[facility]    Nephrology Progress Note     Assessment & Plan       Parmjti Hernández is a 65 year old male who was admitted on 12/3/2020.      1) Baseline Normal Kidney Function with normal K .     2) Treatment of HTN with lisinopril, Triamterene-hydrochlorothiazide and metoprolol.     3) Low BP over the last few days. Now better.       4) KWABENA:  Likely prerenal due to low BP, diuretic, and ACEi.     5) High K intake of late with generous Gatorade.     6) Hyperkalemia:  Multifactorial.  Prerenal effect.  ACEi.  Triamterene.  High K intake. KWABENA.     7) No urine retention seen by bladder scan.          Suggest:    OK for discharge  Keep off lisinopril and Maxzide  Keep on metoprolol - he was on XL 50 mg BID.  It has been held and HR still only 63, so perhaps 25 mg BID or XL 50 mg once daily.    In any case he will need follow up to see how BP responds.      Best stay off lisinopril for now.    He may need some diuretic for edema eventually.  Could consider hydrochlorothiazide without triamterene.          Sherwin Carpenter MD  [facility] - Nephrology  [phone]    Interval History      Feels fine.  Really wants to go home.  K and cr better.      Physical Exam   Temp: 97.4  F (36.3  C) Temp src: Oral BP: 134/78 Pulse: 63   Resp: 16 SpO2: 98 % O2 Device: None (Room air)    Vitals:    12/03/20 0613   Weight: 115.2 kg (254 lb)     Vital Signs with Ranges  Temp:  [97.4  F (36.3  C)-98.2  F (36.8  C)] 97.4  F (36.3  C)  Pulse:  [63-73] 63  Resp:  [14-16] 16  BP: ()/(55-78) 134/78  SpO2:  [92 %-98 %] 98 %  I/O last 3 completed shifts:  In: 300 [P.O.:300]  Out: -     GENERAL APPEARANCE: pleasant, NAD, a & o  HEENT:  Eyes/ears/nose/neck grossly normal  RESP: lungs cta b c good efforts, no crackles, rhonchi or wheezes  CV: RRR, nl S1/S2, no m/r/g   ABDOMEN: o/s/nt/nd, bs present  EXTREMITIES/SKIN: no rashes/lesions; 1+ BLE edema    Medications       ALPRAZolam  0.5 mg Oral At Bedtime      atorvastatin  20 mg Oral Daily     cariprazine  3 mg Oral Daily     divalproex sodium delayed-release  500 mg Oral At Bedtime     pregabalin  100 mg Oral TID     sodium chloride (PF)  3 mL Intracatheter Q8H       Data   BMP  Recent Labs   Lab 12/04/20  1130 12/03/20  2033 12/03/20  1618 12/03/20  1503 12/03/20  0930 12/03/20  0930    138 140  --   --  138   POTASSIUM 5.3 5.9* 6.7* 7.2*   < > 6.6*   CHLORIDE 110* 112* 115*  --   --  114*   LUIS 8.4* 7.9* 8.2*  --   --  8.5   CO2 24 21 21  --   --  19*   BUN 28 40* 45*  --   --  47*   CR 1.16 1.33* 1.59*  --   --  1.73*   * 149* 115*  --   --  106*    < > = values in this interval not displayed.     [email or web address](phos:4)  CBC)  Recent Labs   Lab 12/04/20  1130 12/03/20  0622   WBC 4.2 6.2   HGB 11.6* 11.0*   HCT 34.2* 33.1*   MCV 96 96   * 126*     Recent Labs   Lab 12/03/20  0930   AST 20   ALT 41   ALKPHOS 80   BILITOTAL 0.3     No lab results found in last 7 days.  No results found for: D2VIT, D3VIT, DTOT  Recent Labs   Lab 12/04/20  1130   HGB 11.6*   HCT 34.2*   MCV 96     No results for input(s): PTHI in the last 168 hours.    Attestation:   I have reviewed today's relevant vital signs, notes, medications, labs and imaging.

## 2020-12-04 NOTE — PROGRESS NOTES
DATE & TIME: 12/4/2020 8940-4782          Cognitive Concerns/ Orientation : A&O x4   BEHAVIOR & AGGRESSION TOOL COLOR: Green             ABNL VS/O2: /78; VSS on RA  MOBILITY: independent; ambulation encouraged  PAIN MANAGMENT: headache treated with prn tylenol  DIET: Renal  BOWEL/BLADDER: continent, pt requesting senna but not ordered; one BM on this shift  ABNL LAB/BG: BG 74; pt given juice  DRAIN/DEVICES: PIV infusing sodium bicarb  TELEMETRY RHYTHM: SR  SKIN: red/irritated pannus; pt agreed to bed bath later in day; BLE edema+1; compression socks on  TESTS/PROCEDURES: n/a  D/C DAY/GOALS/PLACE: Discharge pending progress  OTHER IMPORTANT INFO: pt anxious about discharge

## 2020-12-04 NOTE — PLAN OF CARE
Afebrile. No pain. /61. A&Ox4. K 7.2-gave multiple IV meds including PO Kayexalate. Will get another dose of Kayexalate this evening. No stools yet. Blood sugar 83 D10 started at 1810. Cr 1.59. Nephrology consulted. Rash in abd fold-microguard ordered. On tele NSR. Denies lightheadedness. B;adder scanned when neph MD was in the room, only 28 ml in bladder-pt reported he went to the bathroom not long before the bladder scan.

## 2020-12-04 NOTE — DISCHARGE SUMMARY
[facility]  Hospitalist Discharge Summary       Date of Admission:  12/3/2020  Date of Discharge:  12/4/2020  Discharging Provider: Donald Lao MD      Discharge Diagnoses   Symptomatic hypotension with history of hypertension   Acute kidney injury, prerenal   Hyperkalemia   Non anion gap metabolic acidosis   Amyloidosis   S/p autologous stem cell transplant 2016   Diabetes mellitus type 2    Liver cirrhosis   Splenomegaly   Mild chronic thrombocytopenia    Normocytic anemia secondary to chronic disease and liver cirrhosis   CAD with prior NSTEMI   Peripheral neuropathy   Polyneuropathy   Chronic back pain   Bipolar disorder   Anxiety  Depression      Follow-ups Needed After Discharge   Follow-up Appointments     Follow-up and recommended labs and tests       Follow up with primary care provider, Vince Henry, within 7 days for   hospital follow- up.  The following labs/tests are recommended: BMP.               Hospital Course   Parmjit Hernández is a 65 year old male with past medical history significant for Type II DM, hypertension hyperlipidemia, NORMA, chronic pain, and possible Guillain-Barré syndrome admitted on 12/3/2020 with lightheadedness and hypotension.     Symptomatic hypotension with history of hypertension   Presents after noticing low blood pressure at home with associated lightheadedness. PTA medications include lisinopril 20mg daily, metoprolol succinate 50 mg BID, and triamterene-hydrochlorothiazide 37.5-25mg daily. Hypotension likely related to too much antihypertensive medications - recently added maxzide and increased lisinopril dose following hospitalization for NSTEMI. He does also endorse some loose stools over the past day which could be contributing, though pt denies significant diarrhea, and is eating and drinking normally. Low suspicion for underlying infectious etiology. Recent TTE in Nov 2020 was unremarkable.  - Blood pressure and symptoms improved with  fluids and holding blood pressure medications  - Hold lisinopril and diuretic on discharge  - Metoprolol XL resumed on discharge at half dose (heart rate in low 60s off of it)   - Consider hydrochlorothiazide alone if blood pressures elevated  - No diarrhea during admission, had normal formed stool      Acute kidney injury, prerenal   Hyperkalemia   Non anion gap metabolic acidosis   Creatinine elevated to 1.85 (baseline creatinine is around 1.05), Bicarb 17, pH 5.27, Potassium 6.2 on admission. Received insulin with dextrose x2, calcium gluconate, albuterol, and sodium bicarb in the ED. Likely related to KWABENA and medications as noted above.   - Nephrology consulted  - Potassium, creatinine improved with fluids, holding of blood pressure medications  - Recheck BMP after discharge      Amyloidosis   S/p autologous stem cell transplant 2016   Follows with [facility]. Last follow-up was 2019, with normal immunoglobulin free light chains and negative SPEP. He had no evidence of infiltrative cardiomyopathy. Less likely that this is contributing to above presentation or renal failure, but if there is no improvement, could consider additional work-up.      Diabetes mellitus type 2    Continue PTA medications.     Liver cirrhosis   Splenomegaly   LFTs unremarkable.      Mild chronic thrombocytopenia    Normocytic anemia secondary to chronic disease and liver cirrhosis   No acute issues      CAD with prior NSTEMI    Hospitalized in Mid November for chest pain, elevated BP and troponin elevation. Treated for NSTEMI. Cardiology evaluated and recommended BP control. Added Maxzide, increased lisinopril dose.    - Continue ASA and atorvastatin when verified      Peripheral neuropathy   Polyneuropathy   Has been endorsing significant weakness over the past few months, has been hospitalized for this a few times. Has undergone extensive work-up with Unclear etiology. [facility] with Dr. Miles. He has  been undergoing outpatient work-up for Guillain-[place]. Per his report his symptoms have been fairly stable.   - Per patient report, recent testing negative for Guillain-[place]     Chronic back pain   Followed by [facility] pain clinic. Had prior surgery which did not help the pain. Has a pain pump with fentanyl, bupivacaine and morphine. Continue prior to admission regimen.      Bipolar disorder   Anxiety  Depression   Continue prior to admission medications         Consultations This Hospital Stay   NEPHROLOGY IP CONSULT  PHARMACY IP CONSULT  CARE MANAGEMENT / SOCIAL WORK IP CONSULT    Code Status   Full Code    Time Spent on this Encounter   I, Donald Lao MD, personally saw the patient today and spent greater than 30 minutes discharging this patient.       Donald Lao MD  [facility]  ______________________________________________________________________    Physical Exam   Vital Signs: Temp: 97.4  F (36.3  C) Temp src: Oral BP: 134/78 Pulse: 63   Resp: 16 SpO2: 98 % O2 Device: None (Room air)    Weight: 254 lbs 0 oz    Constitutional: Well-appearing, NAD  Respiratory: Clear to auscultation bilaterally, good air movement bilaterally  Cardiovascular: RRR, no m/r/g.   GI: Soft, non-tender, non-distended.   Skin/Integumen: Warm, dry  Other:         Primary Care Physician   Vince Henry    Discharge Disposition   Discharged to home  Condition at discharge: Stable      Discharge Orders      Reason for your hospital stay    You were hospitalized for kidney injury and low blood pressure, likely due to medications.     Activity    Your activity upon discharge: activity as tolerated     Follow-up and recommended labs and tests     Follow up with primary care provider, Vince Henry, within 7 days for hospital follow- up.  The following labs/tests are recommended: BMP.     Diet    Follow this diet upon discharge: Regular diet     Discharge Medications   Current Discharge Medication List       CONTINUE these medications which have CHANGED    Details   metoprolol succinate ER (TOPROL-XL) 50 MG 24 hr tablet Take 1 tablet (50 mg) by mouth daily    Associated Diagnoses: Hypertension goal BP (blood pressure) < 140/90         CONTINUE these medications which have NOT CHANGED    Details   acetaminophen (TYLENOL) 325 MG tablet Take 2 tablets (650 mg) by mouth every 4 hours as needed for mild pain  Qty:      Associated Diagnoses: Generalized muscle weakness      alpha-lipoic acid 100 MG capsule Take 200 mg by mouth daily      ALPRAZolam (XANAX) 0.5 MG tablet Take 0.5 mg by mouth At Bedtime      aspirin (ASPIRIN LOW DOSE) 81 MG tablet Take 1 tablet (81 mg) by mouth daily  Qty: 30 tablet, Refills: 3    Associated Diagnoses: Hyperlipidemia LDL goal <100; Hypertension goal BP (blood pressure) < 140/90      atorvastatin (LIPITOR) 20 MG tablet Take 1 tablet (20 mg) by mouth daily  Qty: 90 tablet, Refills: 1    Associated Diagnoses: Hyperlipidemia LDL goal <100      cariprazine (VRAYLAR) 3 MG CAPS capsule Take 3 mg by mouth daily       divalproex sodium delayed-release (DEPAKOTE) 500 MG DR tablet Take 500 mg by mouth At Bedtime   Qty: 10 tablet, Refills: 0    Comments: Under care of Dr KING ([place] [facility])  Associated Diagnoses: Mood disorder (H)      metFORMIN (GLUCOPHAGE-XR) 500 MG 24 hr tablet Take 1000mg in AM and 1000mg in PM  Qty: 360 tablet, Refills: 1    Associated Diagnoses: Type 2 diabetes mellitus with other specified complication, without long-term current use of insulin (H)      multivitamin w/minerals (THERA-VIT-M) tablet Take 1 tablet by mouth daily      NONFORMULARY by Intrathecal route continuous - + up to 4 boluses daily (100mcg each bolus usually once or 2x per day)    Managed by [facility]     Medications in Pump:  fentanyl 2000mcg/mL  Bupivacaine 20mg/mL  Morphine 5.8mg/mL     Rate:   Fentanyl 1200mcg/day  Bupivacaine 12mg/day  Morphine 4.2mg/day  Pump Last Fill  Date:  09/2020      pregabalin (LYRICA) 100 MG capsule Take 1 capsule (100 mg) by mouth 4 times daily  Qty: 360 capsule, Refills: 3    Associated Diagnoses: Peripheral polyneuropathy      senna-docusate (SENOKOT-S/PERICOLACE) 8.6-50 MG tablet Take 1 tablet by mouth daily Take 1 tablet in the morning and 2 tablets in the evening.       sitagliptin (JANUVIA) 100 MG tablet Take 1 tablet (100 mg) by mouth daily  Qty: 90 tablet, Refills: 0    Associated Diagnoses: Type 2 diabetes mellitus with other specified complication, without long-term current use of insulin (H)      SUMAtriptan (IMITREX) 50 MG tablet Take 1 tablet (50 mg) by mouth at onset of headache for migraine May repeat in 2 hours. Max 4 tablets/24 hours.  Qty: 8 tablet, Refills: 1    Associated Diagnoses: Mixed common migraine and muscle contraction headache      tamsulosin (FLOMAX) 0.4 MG capsule Take 1 capsule (0.4 mg) by mouth 2 times daily  Qty: 30 capsule, Refills: 3    Associated Diagnoses: Urinary retention with incomplete bladder emptying      !! Turmeric 500 MG CAPS Take 1,200 mg by mouth daily       !! TURMERIC PO Take 500 mg by mouth      vitamin C (ASCORBIC ACID) 1000 MG TABS Take 1,000 mg by mouth daily       !! - Potential duplicate medications found. Please discuss with provider.      STOP taking these medications       lisinopril (ZESTRIL) 20 MG tablet Comments:   Reason for Stopping:         triamterene-HCTZ (MAXZIDE-25) 37.5-25 MG tablet Comments:   Reason for Stopping:               Significant Results and Procedures   Most Recent 3 CBC's:  Recent Labs   Lab Test 12/04/20  1130 12/03/20  0622 11/27/20  0949   WBC 4.2 6.2 7.0   HGB 11.6* 11.0* 12.6*   MCV 96 96 97   * 126* 137*     Most Recent 3 BMP's:  Recent Labs   Lab Test 12/04/20  1130 12/03/20  2033 12/03/20  1618    138 140   POTASSIUM 5.3 5.9* 6.7*   CHLORIDE 110* 112* 115*   CO2 24 21 21   BUN 28 40* 45*   CR 1.16 1.33* 1.59*   ANIONGAP 6 5 4   LUIS 8.4* 7.9* 8.2*   GLC  133* 149* 115*     Most Recent 2 LFT's:  Recent Labs   Lab Test 12/03/20  0930 11/15/20  2229   AST 20 23   ALT 41 57   ALKPHOS 80 90   BILITOTAL 0.3 0.4     Most Recent 3 INR's:  Recent Labs   Lab Test 04/17/20  2009 01/27/18  1556 02/19/17  0734   INR 1.18* 1.06 1.08     Most Recent 3 Troponin's:  Recent Labs   Lab Test 11/15/20  2229 11/13/20  0616 11/12/20  1801 01/26/18  1912 01/26/18  1912   TROPI 0.025 0.031 0.087*   < >  --    TROPONIN  --   --   --   --  0.00    < > = values in this interval not displayed.     Most Recent 3 BNP's:  Recent Labs   Lab Test 11/12/20  1801 08/17/20  0132 04/19/20  2231 05/25/17  0904 05/25/17  0904   NTBNPI 96 206 1,501*   < >  --    NTBNP  --   --   --   --  589*    < > = values in this interval not displayed.     Most Recent Cholesterol Panel:  Recent Labs   Lab Test 02/24/20  1219   CHOL 163   *   HDL 32*   TRIG 121     Most Recent 6 Bacteria Isolates From Any Culture (See EPIC Reports for Culture Details):  Recent Labs   Lab Test 11/30/20  1340 06/10/20  1129 01/03/19  0827 02/01/17  1904 02/01/17  1848   CULT Culture negative monitoring continues No growth after 4 weeks No beta hemolytic Streptococcus Group A isolated No growth No growth     Most Recent TSH and T4:  Recent Labs   Lab Test 11/12/20  1801   TSH 2.47     Most Recent Hemoglobin A1c:  Recent Labs   Lab Test 11/19/20  0954   A1C 5.6     Most Recent Urinalysis:  Recent Labs   Lab Test 12/03/20  0738 12/21/18  1134 12/21/18  1134   COLOR Yellow   < > Yellow   APPEARANCE Clear   < > Clear   URINEGLC Negative   < > 100*   URINEBILI Negative   < > Negative   URINEKETONE Negative   < > Negative   SG 1.015   < > 1.010   UBLD Negative   < > Negative   URINEPH 5.0   < > 5.5   PROTEIN Negative   < > Negative   UROBILINOGEN  --   --  0.2   NITRITE Negative   < > Negative   LEUKEST Negative   < > Negative   RBCU 0   < > O - 2   WBCU 1   < > 0 - 5    < > = values in this interval not displayed.     Most Recent ABG:No  lab results found.  Most Recent ESR & CRP:  Recent Labs   Lab Test 10/19/20  2321 08/28/18  1259   SED  --  9   CRP 5.2 6.6   ,   Results for orders placed or performed during the hospital encounter of 11/30/20   XR Lumbar Puncture Spinal Tap Diag    Narrative    EXAM: XR LUMBAR PUNCTURE SPINAL TAP DIAGNOSTIC  11/30/2020 1:56 PM       History:  Chronic fatigue syndrome with muscle spasms. Request for  lumbar puncture to evaluate for GBS.     PROCEDURE: The patient consented for a lumbar puncture. The benefits  and risks of the procedure were explained to the patient, including  but not limited to worsening headache, hemorrhage, infection, lower  extremity pain, or nerve root injury. The patient was sterilely  prepped and draped with the patient in the supine position, over the  lower back. Under fluoroscopic guidance, the interlaminar spaces were  noted. 1% lidocaine was administered for local anesthetic over the  L2-3 interlaminar space, and a 22 gauge needle was advanced into the  thecal sac under fluoroscopic guidance.  There was initial aspiration  of clear CSF. Approximately 12 mL of clear CSF was then aspirated..   The needle was removed. There were no immediate complications  associated with the procedure.   Estimated blood loss during the  procedure was less than 5 mL.    Opening Pressure: 19 cm of water  Fluoro time: 0.2 minutes   Images Obtained: 3      Impression    IMPRESSION: Successful lumbar puncture.    ARMIN DIANE PA-C     *Note: Due to a large number of results and/or encounters for the requested time period, some results have not been displayed. A complete set of results can be found in Results Review.       Allergies   Allergies   Allergen Reactions     Cephalexin Diarrhea     Liraglutide Other (See Comments)     Sulfa Drugs Swelling     Pt has taken  Taken sulfa drugs orally without trouble. He had problems with Sulfa eye drops. Eye swelled up     Victoza      Increased migraine frequency and  severity

## 2020-12-04 NOTE — PLAN OF CARE
DATE & TIME: 12/3/2020 4373-1773    Cognitive Concerns/ Orientation : Pt A/Ox4   BEHAVIOR & AGGRESSION TOOL COLOR: Green   ABNL VS/O2: VSS on RA, BP 98/60  MOBILITY: SBA, calls appropriately  PAIN MANAGMENT: Denies  DIET: Renal  BOWEL/BLADDER: Continent, no BM tonight  ABNL LAB/BG: K 6.7, 5.9/, 121/117/107  DRAIN/DEVICES: IVF sodium bicarb infusing. IV D10 infusing until 2240 then stopped  TELEMETRY RHYTHM: SR with BBB  SKIN: Blanchable redness to coccyx, BLE arndt/red dry. Edema +1.  TESTS/PROCEDURES: Nephrology following  D/C DAY/GOALS/PLACE: Discharge pending progress  OTHER IMPORTANT INFO: Pt complains of intermittent dizziness.

## 2020-12-04 NOTE — PLAN OF CARE
Discharge    Patient discharged to home via HE at 1645  Care plan note  Discharge instructions reviewed, questions answered. Discussed metoprolol change and which meds to stop taking. IV's removed. He will make his own appointment with his PCP next week    Listed belongings gathered and returned to patient. Yes, security envelop returned to patient, pt opened the envelop  Care Plan and Patient education resolved: yes  Prescriptions if needed, hard copies sent with patient  none needed  Home and hospital acquired medications returned to patient: no  Medication Bin checked and emptied on discharge yes  Follow up appointment made for patient: no

## 2020-12-04 NOTE — CONSULTS
Care Management Initial Consult   Met with patient to discuss discharge plans. Patient is anxious to leave. States he is feeling better. Patient sating he is independent and able to function at home. Manages his own medications. States he gets anxious at times but able to cope. Patient would like to make his own appointment. Patient requested transportation as his family is unable to do so.  General Information  Assessment completed with: Patient,    Type of CM/SW Visit: Offer D/C Planning  Primary Care Provider verified and updated as needed:     Readmission within the last 30 days: current reason for admission unrelated to previous admission   Return Category: Progression of disease  Reason for Consult: discharge planning;transportation       Communication Assessment  Patient's communication style: spoken language (English or Bilingual)    Hearing Difficulty or Deaf: no   Wear Glasses or Blind: yes    Cognitive  Cognitive/Neuro/Behavioral: WDL                      Living Environment:   People in home: alone     Current living Arrangements:        Able to return to prior arrangements: yes       Family/Social Support:  Care provided by: self  Provides care for: no one     Children        supportive            Financial Concerns: No concerns identified           Lifestyle & Psychosocial Needs:  Lifestyle     Physical activity     Days per week: 1 day     Minutes per session: Not on file     Stress: Not on file     Social Needs     Financial resource strain: Not very hard     Food insecurity     Worry: Never true     Inability: Never true     Transportation needs     Medical: No     Non-medical: No     Socioeconomic History     Marital status:      Spouse name: Not on file     Number of children: 3     Years of education: Not on file     Highest education level: Not on file   Occupational History     Employer: [email or web address]     Tobacco Use     Smoking status: Never Smoker     Smokeless tobacco: Never Used    Substance and Sexual Activity     Alcohol use: Not Currently     Alcohol/week: 0.0 standard drinks     Drug use: Not Currently     Types: Cocaine, Marijuana, Methamphetamines, Opiates, IV, Amphetamines, Barbiturates, Benzodiazepines, Codeine, Fentanyl, Hashish, Hydrocodone, Hydromorphone, LSD, Oxycodone     Sexual activity: Not Currently     Partners: Female       Functional Status:  Prior to admission patient needed assistance: Patient is independent               Care Management Discharge Note    Discharge Date: 12/05/20(home)       Discharge Disposition:  House    Discharge Services:  None    Discharge DME:  None    Discharge Transportation:   [employer] at 4:45 pm. Patient aware of private cost.    Private pay costs discussed: transportation costs         Education Provided on the Discharge Plan:    Persons Notified of Discharge Plans: Yes  Patient/Family in Agreement with the Plan:  Yes    Handoff Referral Completed: Yes    Vinay Klein RN  Inpatient Care Coordinator  [facility]  #[phone]

## 2020-12-04 NOTE — PHARMACY-CONSULT NOTE
Review all medications for possible side effect of hyperkalemia (i.e. ACE inhibitor, potassium supplements, maintenance IV fluids, TPN, spironolactone, etc.)    December 4, 2020 Pt was on Lisinopril prior to admission - Triamterene component of Maxzide - 25 and Gatorade all contributing to high potassium -  Currently not on any medications which would contribute to hyperkalemia

## 2020-12-05 ENCOUNTER — HOSPITAL ENCOUNTER (OUTPATIENT)
Facility: CLINIC | Age: 65
Setting detail: OBSERVATION
Discharge: HOME HEALTH | End: 2020-12-07
Attending: EMERGENCY MEDICINE | Admitting: INTERNAL MEDICINE
Payer: COMMERCIAL

## 2020-12-05 ENCOUNTER — APPOINTMENT (OUTPATIENT)
Dept: GENERAL RADIOLOGY | Facility: CLINIC | Age: 65
End: 2020-12-05
Attending: PHYSICIAN ASSISTANT
Payer: COMMERCIAL

## 2020-12-05 ENCOUNTER — HOSPITAL ENCOUNTER (EMERGENCY)
Facility: CLINIC | Age: 65
Discharge: HOME | End: 2020-12-05
Attending: PHYSICIAN ASSISTANT
Payer: COMMERCIAL

## 2020-12-05 ENCOUNTER — NURSE TRIAGE (OUTPATIENT)
Dept: NURSING | Facility: CLINIC | Age: 65
End: 2020-12-05

## 2020-12-05 VITALS
WEIGHT: 255 LBS | DIASTOLIC BLOOD PRESSURE: 72 MMHG | OXYGEN SATURATION: 98 % | RESPIRATION RATE: 18 BRPM | SYSTOLIC BLOOD PRESSURE: 119 MMHG | BODY MASS INDEX: 40.02 KG/M2 | TEMPERATURE: 98.3 F | HEART RATE: 59 BPM | HEIGHT: 67 IN

## 2020-12-05 DIAGNOSIS — R07.9 CHEST PAIN, UNSPECIFIED TYPE: ICD-10-CM

## 2020-12-05 DIAGNOSIS — R06.02 SHORTNESS OF BREATH: ICD-10-CM

## 2020-12-05 DIAGNOSIS — F41.9 ANXIETY: ICD-10-CM

## 2020-12-05 DIAGNOSIS — I10 HYPERTENSION GOAL BP (BLOOD PRESSURE) < 140/90: Primary | ICD-10-CM

## 2020-12-05 DIAGNOSIS — R07.89 CHEST TIGHTNESS: ICD-10-CM

## 2020-12-05 LAB
ANION GAP SERPL CALCULATED.3IONS-SCNC: 4 MMOL/L (ref 3–14)
ANION GAP SERPL CALCULATED.3IONS-SCNC: 5 MMOL/L (ref 3–14)
BACTERIA SPEC CULT: NO GROWTH
BASOPHILS # BLD AUTO: 0 10E9/L (ref 0–0.2)
BASOPHILS # BLD AUTO: 0 10E9/L (ref 0–0.2)
BASOPHILS NFR BLD AUTO: 0.4 %
BASOPHILS NFR BLD AUTO: 0.6 %
BUN SERPL-MCNC: 19 MG/DL (ref 7–30)
BUN SERPL-MCNC: 20 MG/DL (ref 7–30)
CALCIUM SERPL-MCNC: 8.3 MG/DL (ref 8.5–10.1)
CALCIUM SERPL-MCNC: 8.5 MG/DL (ref 8.5–10.1)
CHLORIDE SERPL-SCNC: 108 MMOL/L (ref 94–109)
CHLORIDE SERPL-SCNC: 110 MMOL/L (ref 94–109)
CO2 SERPL-SCNC: 27 MMOL/L (ref 20–32)
CO2 SERPL-SCNC: 28 MMOL/L (ref 20–32)
CREAT SERPL-MCNC: 1.06 MG/DL (ref 0.66–1.25)
CREAT SERPL-MCNC: 1.09 MG/DL (ref 0.66–1.25)
D DIMER PPP FEU-MCNC: 0.4 UG/ML FEU (ref 0–0.5)
DIFFERENTIAL METHOD BLD: ABNORMAL
DIFFERENTIAL METHOD BLD: ABNORMAL
EOSINOPHIL # BLD AUTO: 0.2 10E9/L (ref 0–0.7)
EOSINOPHIL # BLD AUTO: 0.2 10E9/L (ref 0–0.7)
EOSINOPHIL NFR BLD AUTO: 3.7 %
EOSINOPHIL NFR BLD AUTO: 3.7 %
ERYTHROCYTE [DISTWIDTH] IN BLOOD BY AUTOMATED COUNT: 13.5 % (ref 10–15)
ERYTHROCYTE [DISTWIDTH] IN BLOOD BY AUTOMATED COUNT: 13.6 % (ref 10–15)
GFR SERPL CREATININE-BSD FRML MDRD: 71 ML/MIN/{1.73_M2}
GFR SERPL CREATININE-BSD FRML MDRD: 74 ML/MIN/{1.73_M2}
GLUCOSE SERPL-MCNC: 114 MG/DL (ref 70–99)
GLUCOSE SERPL-MCNC: 119 MG/DL (ref 70–99)
HCT VFR BLD AUTO: 31 % (ref 40–53)
HCT VFR BLD AUTO: 32.4 % (ref 40–53)
HGB BLD-MCNC: 10.6 G/DL (ref 13.3–17.7)
HGB BLD-MCNC: 10.9 G/DL (ref 13.3–17.7)
IMM GRANULOCYTES # BLD: 0 10E9/L (ref 0–0.4)
IMM GRANULOCYTES # BLD: 0 10E9/L (ref 0–0.4)
IMM GRANULOCYTES NFR BLD: 0.6 %
IMM GRANULOCYTES NFR BLD: 0.7 %
INTERPRETATION ECG - MUSE: NORMAL
INTERPRETATION ECG - MUSE: NORMAL
LYMPHOCYTES # BLD AUTO: 0.5 10E9/L (ref 0.8–5.3)
LYMPHOCYTES # BLD AUTO: 0.6 10E9/L (ref 0.8–5.3)
LYMPHOCYTES NFR BLD AUTO: 10.5 %
LYMPHOCYTES NFR BLD AUTO: 12.9 %
MCH RBC QN AUTO: 32.2 PG (ref 26.5–33)
MCH RBC QN AUTO: 32.6 PG (ref 26.5–33)
MCHC RBC AUTO-ENTMCNC: 33.6 G/DL (ref 31.5–36.5)
MCHC RBC AUTO-ENTMCNC: 34.2 G/DL (ref 31.5–36.5)
MCV RBC AUTO: 95 FL (ref 78–100)
MCV RBC AUTO: 96 FL (ref 78–100)
MONOCYTES # BLD AUTO: 0.4 10E9/L (ref 0–1.3)
MONOCYTES # BLD AUTO: 0.4 10E9/L (ref 0–1.3)
MONOCYTES NFR BLD AUTO: 7.8 %
MONOCYTES NFR BLD AUTO: 8.1 %
NEUTROPHILS # BLD AUTO: 3.5 10E9/L (ref 1.6–8.3)
NEUTROPHILS # BLD AUTO: 3.6 10E9/L (ref 1.6–8.3)
NEUTROPHILS NFR BLD AUTO: 74.4 %
NEUTROPHILS NFR BLD AUTO: 76.6 %
NRBC # BLD AUTO: 0 10*3/UL
NRBC # BLD AUTO: 0 10*3/UL
NRBC BLD AUTO-RTO: 0 /100
NRBC BLD AUTO-RTO: 0 /100
NT-PROBNP SERPL-MCNC: 384 PG/ML (ref 0–900)
PLATELET # BLD AUTO: 101 10E9/L (ref 150–450)
PLATELET # BLD AUTO: 103 10E9/L (ref 150–450)
POTASSIUM SERPL-SCNC: 4.9 MMOL/L (ref 3.4–5.3)
POTASSIUM SERPL-SCNC: 5.4 MMOL/L (ref 3.4–5.3)
RBC # BLD AUTO: 3.25 10E12/L (ref 4.4–5.9)
RBC # BLD AUTO: 3.38 10E12/L (ref 4.4–5.9)
SARS-COV-2 RNA SPEC QL NAA+PROBE: NORMAL
SODIUM SERPL-SCNC: 140 MMOL/L (ref 133–144)
SODIUM SERPL-SCNC: 142 MMOL/L (ref 133–144)
SPECIMEN SOURCE: NORMAL
SPECIMEN SOURCE: NORMAL
TROPONIN I SERPL-MCNC: 0.08 UG/L (ref 0–0.04)
TROPONIN I SERPL-MCNC: 0.08 UG/L (ref 0–0.04)
TROPONIN I SERPL-MCNC: 0.09 UG/L (ref 0–0.04)
TSH SERPL DL<=0.005 MIU/L-ACNC: 3.04 MU/L (ref 0.4–4)
WBC # BLD AUTO: 4.6 10E9/L (ref 4–11)
WBC # BLD AUTO: 4.9 10E9/L (ref 4–11)

## 2020-12-05 PROCEDURE — 71045 X-RAY EXAM CHEST 1 VIEW: CPT

## 2020-12-05 PROCEDURE — 85379 FIBRIN DEGRADATION QUANT: CPT | Performed by: PHYSICIAN ASSISTANT

## 2020-12-05 PROCEDURE — 84443 ASSAY THYROID STIM HORMONE: CPT | Performed by: EMERGENCY MEDICINE

## 2020-12-05 PROCEDURE — 84484 ASSAY OF TROPONIN QUANT: CPT | Performed by: EMERGENCY MEDICINE

## 2020-12-05 PROCEDURE — 83880 ASSAY OF NATRIURETIC PEPTIDE: CPT | Performed by: PHYSICIAN ASSISTANT

## 2020-12-05 PROCEDURE — 85025 COMPLETE CBC W/AUTO DIFF WBC: CPT | Performed by: PHYSICIAN ASSISTANT

## 2020-12-05 PROCEDURE — 84484 ASSAY OF TROPONIN QUANT: CPT | Mod: 91 | Performed by: PHYSICIAN ASSISTANT

## 2020-12-05 PROCEDURE — 85025 COMPLETE CBC W/AUTO DIFF WBC: CPT | Performed by: EMERGENCY MEDICINE

## 2020-12-05 PROCEDURE — G0378 HOSPITAL OBSERVATION PER HR: HCPCS

## 2020-12-05 PROCEDURE — 86140 C-REACTIVE PROTEIN: CPT | Performed by: EMERGENCY MEDICINE

## 2020-12-05 PROCEDURE — 99285 EMERGENCY DEPT VISIT HI MDM: CPT | Mod: 25

## 2020-12-05 PROCEDURE — 80048 BASIC METABOLIC PNL TOTAL CA: CPT | Performed by: PHYSICIAN ASSISTANT

## 2020-12-05 PROCEDURE — 99220 PR INITIAL OBSERVATION CARE,LEVEL III: CPT | Performed by: INTERNAL MEDICINE

## 2020-12-05 PROCEDURE — C9803 HOPD COVID-19 SPEC COLLECT: HCPCS

## 2020-12-05 PROCEDURE — 83880 ASSAY OF NATRIURETIC PEPTIDE: CPT | Performed by: EMERGENCY MEDICINE

## 2020-12-05 PROCEDURE — 93005 ELECTROCARDIOGRAM TRACING: CPT | Mod: 76

## 2020-12-05 PROCEDURE — 80048 BASIC METABOLIC PNL TOTAL CA: CPT | Performed by: EMERGENCY MEDICINE

## 2020-12-05 PROCEDURE — 93005 ELECTROCARDIOGRAM TRACING: CPT

## 2020-12-05 PROCEDURE — U0003 INFECTIOUS AGENT DETECTION BY NUCLEIC ACID (DNA OR RNA); SEVERE ACUTE RESPIRATORY SYNDROME CORONAVIRUS 2 (SARS-COV-2) (CORONAVIRUS DISEASE [COVID-19]), AMPLIFIED PROBE TECHNIQUE, MAKING USE OF HIGH THROUGHPUT TECHNOLOGIES AS DESCRIBED BY CMS-2020-01-R: HCPCS | Performed by: PHYSICIAN ASSISTANT

## 2020-12-05 RX ORDER — AMOXICILLIN 250 MG
1-2 CAPSULE ORAL 2 TIMES DAILY PRN
COMMUNITY
End: 2024-08-01

## 2020-12-05 RX ORDER — NITROGLYCERIN 0.4 MG/1
0.4 TABLET SUBLINGUAL EVERY 5 MIN PRN
Status: DISCONTINUED | OUTPATIENT
Start: 2020-12-05 | End: 2020-12-06

## 2020-12-05 ASSESSMENT — ENCOUNTER SYMPTOMS
VOMITING: 0
SHORTNESS OF BREATH: 1
DIARRHEA: 0
NAUSEA: 0
ABDOMINAL PAIN: 0
SHORTNESS OF BREATH: 1
HEADACHES: 0
FEVER: 0

## 2020-12-05 ASSESSMENT — MIFFLIN-ST. JEOR
SCORE: 1905.3
SCORE: 1905.3

## 2020-12-05 NOTE — ED TRIAGE NOTES
Pt seen yesterday in ED for BP issues and taken off some of his meds.  Reports had some SOB and CP this AM.  Given 2 nitro in rig which improved his chest pain 4/10.  No new EKG changes reported. Also given ASA.

## 2020-12-05 NOTE — ED NOTES
Bed: ED25  Expected date: 12/5/20  Expected time: 2:21 PM  Means of arrival: Ambulance  Comments:  710 64m sob  eta 1431

## 2020-12-05 NOTE — ED AVS SNAPSHOT
[facility] Emergency Dept  [address] [address]  Phone: [phone]  Fax: [phone]                                    Parmjit Hernández   MRN: [id number]    Department: [facility] Emergency Dept   Date of Visit: 12/5/2020           After Visit Summary Signature Page    I have received my discharge instructions, and my questions have been answered. I have discussed any challenges I see with this plan with the nurse or doctor.    ..........................................................................................................................................  Patient/Patient Representative Signature      ..........................................................................................................................................  Patient Representative Print Name and Relationship to Patient    ..................................................               ................................................  Date                                   Time    ..........................................................................................................................................  Reviewed by Signature/Title    ...................................................              ..............................................  Date                                               Time          22EPIC Rev 08/18

## 2020-12-05 NOTE — ED PROVIDER NOTES
History     Chief Complaint:  Chest Pain    HPI   Parmjit Hernández is a 65 year old male with a history of lymphedema, amyloidosis, hyperlipidemia, NSTEMI who presents with shortness of breath.  Patient reports that he was discharged from here yesterday around 0530 and states that he has was given some inhalers but does not know why or how to use them and so has not used them. He states that he was admitted for low blood pressure and they adjusted his medications before discharge. He states that he now just can't seem to catch his breath. He also reports some short lived chest pain in the ambulance which has since resolved.  He was given nitroglycerin in the ambulance but states that this did not seem to affect the pain on further discussion and states that it actually resolved prior to him getting this.  The pain did not radiate.  He states that he checks his blood pressure daily and it has been normal.  He denies any nausea or vomiting.  He denies any fever or cough.  No new leg swelling.    MR Cardiac w Contrast (8/17/20)  1. Normal LV and RV systolic functions.  2. Late gadolinium enhancement imaging shows patchy delayed enhancement in the basal septal wall consistent  with non CAD non specific scar.  3. No evidence of inducible ischemia on regadenoson stress perfusion.     On direct comparison to prior CMR dated 01/25/2018 there is interval decrease in LV wall thickness.     Allergies:  Cephalexin  Liraglutide  Sulfa Drugs  Victoza    Medications:    Januvia  Lyrica  Flomax  Lipitor  Metformin  Aspirin 81 mg  Imitrex  Xanax  Vraylar  Senna-docusate    Past Medical History:    Amyloidosis  Anxiety  Depression  Lymphedema  Bipolar I disorder  Diabetes mellitus, type II  Guillain [place] syndrome  Bone marrow transplant  Hyperlipidemia   Hypertension   Marianne  Narcotic dependence   NSTEMI   OCD   Obstructive sleep apnea   Cirrhosis of liver  Viral warts  Peripheral edema  Polyneuropathy  Renal failure  Restless leg  "syndrome  Sleep apnea  Onychomycosis   Thrombocytopenia    Past Surgical History:    Abdominal hernia  Back surgery  Cholecystectomy   Hernia repair  Deviated septum repair    Family History:    Mother- coronary artery disease, hypertension, arthritis, cerebrovascular disease  Father- coronary artery disease, heart disease, hypertension, depression, asthma  Son- depression, anxiety  Daughter- depression  Brother- diabetes mellitus  Sister- depression     Social History:  Smoking status: Never  Alcohol use: Not currently  Drug use: Not currently  PCP: Vince Henry  Presents to the ED by himself  Marital Status:   [4]    Review of Systems   Respiratory: Positive for shortness of breath.    Cardiovascular: Positive for chest pain.   All other systems reviewed and are negative.      Physical Exam     Patient Vitals for the past 24 hrs:   BP Temp Temp src Pulse Resp SpO2 Height Weight   12/05/20 1700 123/72 -- -- 62 -- 100 % -- --   12/05/20 1601 106/63 -- -- 61 -- 97 % -- --   12/05/20 1448 (!) 163/84 98.3  F (36.8  C) Oral 71 18 93 % 1.702 m (5' 7\") 115.7 kg (255 lb)        Physical Exam  General: Alert and cooperative with exam. Resting comfortably on gurney  Head:  Scalp is NC/AT  Eyes:  Conjunctiva normal, PERRL  ENT:  The external nose and ears are normal.   Neck:  Normal range of motion without rigidity.  CV:  Regular rate and rhythm    No pathologic murmur, rubs, or gallops.  Resp:  Breath sounds are clear bilaterally.  No crackles, wheezes, rhonchi, stridor.    Non-labored, no retractions or accessory muscle use  Abdomen: Abdomen is soft, no distension, no tenderness, no masses. No peritoneal signs. No CVA tenderness.  MS:  No lower extremity edema or asymmetric calf swelling. Normal ROM in all joints without effusions.  Skin:  Warm and dry, No rash or lesions noted. 2+ peripheral pulses in all extremities  Neuro: Alert and oriented x3.  No gross motor deficits.  No facial asymmetry.  Psych: Awake. " Alert. Normal affect. Appropriate interactions.      Emergency Department Course   ECG (14:54:05):  Rate 73 bpm. NH interval 178. QRS duration 118. QT/QTc 398/438. P-R-T axes 11 -39 -5. Sinus rhythm with occasional premature ventricular complexes. Left axis deviation. Incomplete right bundle branch block. Inferior infarct, age undetermined. Anterolateral infarct, age undetermined. Abnormal ECG. Interpreted at 1457 by Arlen Osorio MD.     Imaging:  Radiographic findings were communicated with the patient who voiced understanding of the findings.  XR Chest 2 Views  Unremarkable single view of the chest.   As read by Radiology.     Laboratory:  CBC: WBC 4.6, HGB 10.6 (L),  (L)  BMP: glucose 114 (H), chloride 110 (H), potassium 5.4 (H) o/w WNL (Creatinine 1.06)    Troponin I (Collected 1517): 0.081 (H)  Troponin I (Collected 1730): 0.079 (H)     BNP: 384    D-dimer: 0.4    Emergency Department Course:  Past medical records, nursing notes, and vitals reviewed.  1451: I performed an exam of the patient and obtained history, as documented above.     EKG performed, results above.     IV inserted and blood drawn.    The patient was sent for a chest x-ray while in the emergency department, findings above.     1809: I rechecked the patient prior to discharge.     Findings and plan explained to the Patient. Patient discharged home with instructions regarding supportive care, medications, and reasons to return. The importance of close follow-up was reviewed.   Impression & Plan    Medical Decision Makin-year-old male with complex past medical history including hypertension, hyperlipidemia, coronary artery disease, anxiety, amyloidosis who presents with shortness of breath and a brief episode of chest tightness.  He was just discharged from the hospital yesterday.  He has been seen numerous times over the last month for similar symptoms and most recently was seen by cardiology on  after he was  admitted for chest pain and shortness of breath and found to have chronic low-grade troponin elevation.  At that time they recommended echocardiogram which showed no new wall motion abnormalities and did not decide to pursue angiography.  He was discharged yesterday after being seen for hypotension at which time he had his blood pressure regimen adjusted.  Here in the ED EKG shows incomplete right bundle branch block, no new ST elevation or depression.  Initial and delta troponins are both mildly elevated but no change on repeat at 2 hours and is consistent with the patient's baseline troponin elevations from prior visits.  D-dimer was negative and I doubt pulmonary embolism.  Chest x-ray clear.  No evidence of anemia or metabolic acidosis.  No signs of congestive heart failure.    I had a lengthy discussion with the patient.  Given his known CAD his shortness of breath, and his episode of chest pain we discussed options including admitting him to the hospital for further evaluation by cardiology and serial troponins.  The patient is adamant that he would like to go home as he was just discharged yesterday and states that he has been seen by cardiology several times in the last month and most recently was evaluated by them and underwent echo within the last month for chest pain and shortness of breath.  He had cardiac MRI with stress evaluation in August which did not show evidence of inducible ischemia.  He prefers to try using his inhaler at home to see if this helps his shortness of breath and to follow-up as an outpatient in the next day or 2.  I think this is a reasonable decision but did discuss that I cannot completely exclude cardiac etiology without admitting him for serial troponins which she expressed an understanding of the risks associated with this.  He will return immediately if he has any new or worsening symptoms.  He will otherwise call his doctor tomorrow to arrange for follow-up.  He tested  negative for Covid 2 days ago and has no symptoms besides shortness of breath but did request a repeat Covid test today which I did agree to send though very low suspicion at this time.    Diagnosis:    ICD-10-CM    1. Shortness of breath  R06.02    2. Chest tightness  R07.89        Disposition:  discharged to home    Discharge Medications:  New Prescriptions    No medications on file       Adelaide Araujo  12/5/2020   [facility] EMERGENCY DEPT    I, Adelaide Araujo, am serving as a scribe at 5:33 PM on 12/5/2020 to document services personally performed by Ger Palomo PA-C based on my observations and the provider's statements to me.         Ger Palomo PA-C  12/05/20 1918

## 2020-12-05 NOTE — TELEPHONE ENCOUNTER
Caller states he has trouble breathing today along with high blood pressure 184/99 at last reading .  Caller states he feels like he cannot get enough breath especially with any exertion.   Recently hospitalized with discharge 18 hours ago for low blood pressure  While hospitalized  States that a nurse gave him one treatment with an albuterol MDI and then it was in his discharge bag without instructions; denies history of  Asthma or COPD or smoking history   Caller states he is  Short  of breath at present time and it is causing anxiety   triage protocol reviewed ;AVS instructions reviewed revealing complex medical issues; BP medication was changed  significantly     Advised to return to ED  At [facility]  Caller understands and will comply   Indira King RN  FNA     Reason for Disposition    [1] MODERATE difficulty breathing (e.g., speaks in phrases, SOB even at rest, pulse 100-120) AND [2] NEW-onset or WORSE than normal    [1] Systolic BP  >= 160 OR Diastolic >= 100 AND [2] cardiac or neurologic symptoms (e.g., chest pain, difficulty breathing, unsteady gait, blurred vision)    Additional Information    Negative: [1] Breathing stopped AND [2] hasn't returned    Negative: Choking on something    Negative: Severe difficulty breathing (e.g., struggling for each breath, speaks in single words)    Negative: Bluish (or gray) lips or face now    Negative: Difficult to awaken or acting confused (e.g., disoriented, slurred speech)    Negative: Passed out (i.e., lost consciousness, collapsed and was not responding)    Negative: Wheezing started suddenly after medicine, an allergic food or bee sting    Negative: Stridor    Negative: Slow, shallow and weak breathing    Negative: Sounds like a life-threatening emergency to the triager    Negative: Chest pain    Negative: [1] Wheezing (high pitched whistling sound) AND [2] previous asthma attacks or use of asthma medicines    Negative: [1] Difficulty breathing AND [2] only  present when coughing    Negative: [1] Difficulty breathing AND [2] only from stuffy or runny nose    Negative: Difficult to awaken or acting confused (e.g., disoriented, slurred speech)    Negative: Severe difficulty breathing (e.g., struggling for each breath, speaks in single words)    Negative: [1] Weakness of the face, arm or leg on one side of the body AND [2] new onset    Negative: [1] Numbness (i.e., loss of sensation) of the face, arm or leg on one side of the body AND [2] new onset    Negative: [1] Chest pain lasts > 5 minutes AND [2] history of heart disease  (i.e., heart attack, bypass surgery, angina, angioplasty, CHF)    Negative: [1] Chest pain AND [2] took nitrogylcerin AND [3] pain was not relieved    Negative: Sounds like a life-threatening emergency to the triager    Negative: Symptom is main concern  (e.g., headache, chest pain)    Negative: Low blood pressure is main concern    Protocols used: BREATHING DIFFICULTY-A-AH, HIGH BLOOD PRESSURE-A-AH

## 2020-12-06 LAB
CORTIS SERPL-MCNC: 12.4 UG/DL (ref 4–22)
CRP SERPL-MCNC: 4 MG/L (ref 0–8)
GLUCOSE BLDC GLUCOMTR-MCNC: 112 MG/DL (ref 70–99)
GLUCOSE BLDC GLUCOMTR-MCNC: 140 MG/DL (ref 70–99)
GLUCOSE BLDC GLUCOMTR-MCNC: 86 MG/DL (ref 70–99)
GLUCOSE BLDC GLUCOMTR-MCNC: 88 MG/DL (ref 70–99)
LABORATORY COMMENT REPORT: NORMAL
NT-PROBNP SERPL-MCNC: 585 PG/ML (ref 0–900)
SARS-COV-2 RNA SPEC QL NAA+PROBE: NEGATIVE
SPECIMEN SOURCE: NORMAL
TROPONIN I SERPL-MCNC: 0.08 UG/L (ref 0–0.04)
TROPONIN I SERPL-MCNC: 0.08 UG/L (ref 0–0.04)
TROPONIN I SERPL-MCNC: 0.09 UG/L (ref 0–0.04)

## 2020-12-06 PROCEDURE — 999N001017 HC STATISTIC GLUCOSE BY METER IP

## 2020-12-06 PROCEDURE — 36415 COLL VENOUS BLD VENIPUNCTURE: CPT | Performed by: INTERNAL MEDICINE

## 2020-12-06 PROCEDURE — G0378 HOSPITAL OBSERVATION PER HR: HCPCS

## 2020-12-06 PROCEDURE — 99214 OFFICE O/P EST MOD 30 MIN: CPT | Mod: 25 | Performed by: INTERNAL MEDICINE

## 2020-12-06 PROCEDURE — 82533 TOTAL CORTISOL: CPT | Performed by: INTERNAL MEDICINE

## 2020-12-06 PROCEDURE — 93005 ELECTROCARDIOGRAM TRACING: CPT

## 2020-12-06 PROCEDURE — 93010 ELECTROCARDIOGRAM REPORT: CPT | Performed by: INTERNAL MEDICINE

## 2020-12-06 PROCEDURE — 250N000013 HC RX MED GY IP 250 OP 250 PS 637: Performed by: PHYSICIAN ASSISTANT

## 2020-12-06 PROCEDURE — 99225 PR SUBSEQUENT OBSERVATION CARE,LEVEL II: CPT | Performed by: PHYSICIAN ASSISTANT

## 2020-12-06 PROCEDURE — 250N000013 HC RX MED GY IP 250 OP 250 PS 637: Performed by: INTERNAL MEDICINE

## 2020-12-06 PROCEDURE — 84484 ASSAY OF TROPONIN QUANT: CPT | Performed by: INTERNAL MEDICINE

## 2020-12-06 RX ORDER — METFORMIN HCL 500 MG
1000 TABLET, EXTENDED RELEASE 24 HR ORAL 2 TIMES DAILY WITH MEALS
Status: DISCONTINUED | OUTPATIENT
Start: 2020-12-06 | End: 2020-12-07 | Stop reason: HOSPADM

## 2020-12-06 RX ORDER — MAGNESIUM HYDROXIDE/ALUMINUM HYDROXICE/SIMETHICONE 120; 1200; 1200 MG/30ML; MG/30ML; MG/30ML
30 SUSPENSION ORAL EVERY 4 HOURS PRN
Status: DISCONTINUED | OUTPATIENT
Start: 2020-12-06 | End: 2020-12-07 | Stop reason: HOSPADM

## 2020-12-06 RX ORDER — AMOXICILLIN 250 MG
2 CAPSULE ORAL EVERY EVENING
Status: DISCONTINUED | OUTPATIENT
Start: 2020-12-06 | End: 2020-12-07 | Stop reason: HOSPADM

## 2020-12-06 RX ORDER — PREGABALIN 100 MG/1
100 CAPSULE ORAL 3 TIMES DAILY
Status: DISCONTINUED | OUTPATIENT
Start: 2020-12-06 | End: 2020-12-06

## 2020-12-06 RX ORDER — TAMSULOSIN HYDROCHLORIDE 0.4 MG/1
0.4 CAPSULE ORAL 2 TIMES DAILY
Status: DISCONTINUED | OUTPATIENT
Start: 2020-12-06 | End: 2020-12-07 | Stop reason: HOSPADM

## 2020-12-06 RX ORDER — ALPRAZOLAM 0.5 MG
0.5 TABLET ORAL AT BEDTIME
Status: DISCONTINUED | OUTPATIENT
Start: 2020-12-06 | End: 2020-12-07 | Stop reason: HOSPADM

## 2020-12-06 RX ORDER — ATORVASTATIN CALCIUM 20 MG/1
20 TABLET, FILM COATED ORAL DAILY
Status: DISCONTINUED | OUTPATIENT
Start: 2020-12-06 | End: 2020-12-07 | Stop reason: HOSPADM

## 2020-12-06 RX ORDER — DIVALPROEX SODIUM 500 MG/1
500 TABLET, DELAYED RELEASE ORAL AT BEDTIME
Status: DISCONTINUED | OUTPATIENT
Start: 2020-12-06 | End: 2020-12-07 | Stop reason: HOSPADM

## 2020-12-06 RX ORDER — ACETAMINOPHEN 650 MG/1
650 SUPPOSITORY RECTAL EVERY 4 HOURS PRN
Status: DISCONTINUED | OUTPATIENT
Start: 2020-12-06 | End: 2020-12-07 | Stop reason: HOSPADM

## 2020-12-06 RX ORDER — NITROGLYCERIN 0.4 MG/1
0.4 TABLET SUBLINGUAL EVERY 5 MIN PRN
Status: DISCONTINUED | OUTPATIENT
Start: 2020-12-06 | End: 2020-12-07 | Stop reason: HOSPADM

## 2020-12-06 RX ORDER — HYDROXYZINE HYDROCHLORIDE 25 MG/1
50 TABLET, FILM COATED ORAL 3 TIMES DAILY PRN
Status: DISCONTINUED | OUTPATIENT
Start: 2020-12-06 | End: 2020-12-07

## 2020-12-06 RX ORDER — DEXTROSE MONOHYDRATE 25 G/50ML
25-50 INJECTION, SOLUTION INTRAVENOUS
Status: DISCONTINUED | OUTPATIENT
Start: 2020-12-06 | End: 2020-12-07 | Stop reason: HOSPADM

## 2020-12-06 RX ORDER — HYDRALAZINE HYDROCHLORIDE 20 MG/ML
10 INJECTION INTRAMUSCULAR; INTRAVENOUS EVERY 4 HOURS PRN
Status: DISCONTINUED | OUTPATIENT
Start: 2020-12-06 | End: 2020-12-07 | Stop reason: HOSPADM

## 2020-12-06 RX ORDER — PREGABALIN 100 MG/1
100 CAPSULE ORAL 3 TIMES DAILY
Status: DISCONTINUED | OUTPATIENT
Start: 2020-12-06 | End: 2020-12-07 | Stop reason: HOSPADM

## 2020-12-06 RX ORDER — LIDOCAINE 40 MG/G
CREAM TOPICAL
Status: DISCONTINUED | OUTPATIENT
Start: 2020-12-06 | End: 2020-12-07 | Stop reason: HOSPADM

## 2020-12-06 RX ORDER — NICOTINE POLACRILEX 4 MG
15-30 LOZENGE BUCCAL
Status: DISCONTINUED | OUTPATIENT
Start: 2020-12-06 | End: 2020-12-07 | Stop reason: HOSPADM

## 2020-12-06 RX ORDER — NITROGLYCERIN 0.4 MG/1
0.4 TABLET SUBLINGUAL ONCE
Status: DISCONTINUED | OUTPATIENT
Start: 2020-12-06 | End: 2020-12-06

## 2020-12-06 RX ORDER — ASPIRIN 81 MG/1
81 TABLET ORAL DAILY
Status: DISCONTINUED | OUTPATIENT
Start: 2020-12-06 | End: 2020-12-07 | Stop reason: HOSPADM

## 2020-12-06 RX ORDER — AMOXICILLIN 250 MG
1 CAPSULE ORAL EVERY MORNING
Status: DISCONTINUED | OUTPATIENT
Start: 2020-12-06 | End: 2020-12-07 | Stop reason: HOSPADM

## 2020-12-06 RX ORDER — ALBUTEROL SULFATE 90 UG/1
2 AEROSOL, METERED RESPIRATORY (INHALATION) 4 TIMES DAILY PRN
Status: DISCONTINUED | OUTPATIENT
Start: 2020-12-06 | End: 2020-12-07 | Stop reason: HOSPADM

## 2020-12-06 RX ORDER — ACETAMINOPHEN 325 MG/1
650 TABLET ORAL EVERY 4 HOURS PRN
Status: DISCONTINUED | OUTPATIENT
Start: 2020-12-06 | End: 2020-12-07 | Stop reason: HOSPADM

## 2020-12-06 RX ADMIN — ALPRAZOLAM 0.5 MG: 0.5 TABLET ORAL at 21:52

## 2020-12-06 RX ADMIN — ACETAMINOPHEN 650 MG: 325 TABLET, FILM COATED ORAL at 18:43

## 2020-12-06 RX ADMIN — ACETAMINOPHEN 650 MG: 325 TABLET, FILM COATED ORAL at 08:15

## 2020-12-06 RX ADMIN — ALPRAZOLAM 0.5 MG: 0.5 TABLET ORAL at 02:05

## 2020-12-06 RX ADMIN — DIVALPROEX SODIUM 500 MG: 500 TABLET, DELAYED RELEASE ORAL at 21:52

## 2020-12-06 RX ADMIN — TAMSULOSIN HYDROCHLORIDE 0.4 MG: 0.4 CAPSULE ORAL at 21:52

## 2020-12-06 RX ADMIN — NITROGLYCERIN 0.4 MG: 0.4 TABLET SUBLINGUAL at 09:02

## 2020-12-06 RX ADMIN — CARIPRAZINE 3 MG: 1.5 CAPSULE, GELATIN COATED ORAL at 08:14

## 2020-12-06 RX ADMIN — DOCUSATE SODIUM 50 MG AND SENNOSIDES 8.6 MG 1 TABLET: 8.6; 5 TABLET, FILM COATED ORAL at 09:09

## 2020-12-06 RX ADMIN — ASPIRIN 81 MG: 81 TABLET, DELAYED RELEASE ORAL at 08:16

## 2020-12-06 RX ADMIN — PREGABALIN 100 MG: 100 CAPSULE ORAL at 08:14

## 2020-12-06 RX ADMIN — HYDROXYZINE HYDROCHLORIDE 50 MG: 25 TABLET, FILM COATED ORAL at 11:53

## 2020-12-06 RX ADMIN — TAMSULOSIN HYDROCHLORIDE 0.4 MG: 0.4 CAPSULE ORAL at 08:14

## 2020-12-06 RX ADMIN — ATORVASTATIN CALCIUM 20 MG: 20 TABLET, FILM COATED ORAL at 08:15

## 2020-12-06 RX ADMIN — METFORMIN ER 500 MG 1000 MG: 500 TABLET ORAL at 18:42

## 2020-12-06 RX ADMIN — DOCUSATE SODIUM 50 MG AND SENNOSIDES 8.6 MG 2 TABLET: 8.6; 5 TABLET, FILM COATED ORAL at 18:43

## 2020-12-06 RX ADMIN — PREGABALIN 100 MG: 100 CAPSULE ORAL at 21:52

## 2020-12-06 ASSESSMENT — MIFFLIN-ST. JEOR: SCORE: 1946.13

## 2020-12-06 NOTE — PROGRESS NOTES
RECEIVING UNIT ED HANDOFF REVIEW    ED Nurse Handoff Report was reviewed by: Anjali Dominguez RN on December 6, 2020 at 12:47 AM

## 2020-12-06 NOTE — CONSULTS
Electrophysiology/ Cardiology- Consult Note         H&P and Plan:     Reason for consult: Shortness of breath.      History of present illness: 65-year old gentleman with a history of hypertension, hyperlipidemia, diabetes, NORMA, bipolar disorder/anxiety, polyneuropathy/previous Guillain-Barré syndrome and systemic amyloidosis (autologous stem cell transplant in 2016), who presents with shortness of breath and troponin elevation.     Patient was recently admitted in the hospital with hypotension.  During previous hospital stay, lisinopril was discontinued and was discharged on 12/04.  He was readmitted today due to complaints of shortness of breath and chest discomfort.  Troponins were elevated and cardiology was consulted.    Patient informs that a couple hours after he was discharged from the hospital, he noticed shortness of breath at rest.  He describes as he was having a hard time taking deep breathes.  He also complained some chest discomfort, which he affirms was mild and describes the chest tightness.  EMS was called and he was brought to the ED for evaluation. In the ED, he was saturating well and troponins were elevated.  He was then admitted for observation.    At the moment, he feels better.  He denies any symptoms at this time.  He denies chest pain, shortness of breath, lightheadedness, near-syncope or syncope.    I reviewed all the EKGs. EKGs showed an incomplete RBBB pattern and possible Q waves in inferior leads, and the EKGs findings are unchanged from previous EKGs.  Troponins were mild elevated but flat around 0.079-0.09.    Of note, patient is known to have chronic troponin anemia.  He has had several ischemic work-up in the past, including a recent stress cardiac MRI and a coronary angiography in 2018 (details below).    Previous studies:  -Stress cardiac MRI (8/17/2020):  Normal LV and RV systolic functions.  No evidence of ischemia.  Late gadolinium enhancement imaging shows patchy delayed  "enhancement in the basal septal wall consistent with non CAD and non specific scar.  -Echocardiogram (11/13/2020): Normal LV function.  EF estimated 60-65%.  There was mild LVH and no significant valve disease.    -Cath (04/2018-outside hospital): Normal coronaries.    Plan:  1.  Chest discomfort/shortness of breath.  Symptoms are atypical for underlying CAD.  Patient is known to have chronic troponinemia and therefore it is a nonspecific finding.  He has had several work-ups for ischemia in the past which have been negative.    At the moment, he seems to be asymptomatic.  He appears to be euvolemic on physical exam and proBNP was within normal limits.  I recommend obtaining limited echo.  If respiratory symptoms report, we will consider an attempt of diuresis (although proBNP is within normal limits and I am not sure his symptoms are cardiac in origin at this time).     Fredy Forbes MD    Physical Exam:  Vitals: BP (!) 180/92   Pulse 53   Temp 98  F (36.7  C) (Axillary)   Resp 20   Ht 1.702 m (5' 7\")   Wt 119.7 kg (264 lb)   SpO2 97%   BMI 41.35 kg/m        Intake/Output Summary (Last 24 hours) at 12/6/2020 1256  Last data filed at 12/6/2020 0801  Gross per 24 hour   Intake 480 ml   Output 900 ml   Net -420 ml     Vitals:    12/05/20 2213 12/06/20 0134   Weight: 115.7 kg (255 lb) 119.7 kg (264 lb)       Constitutional:  AAO x3.  Pt is in NAD.  HEAD: Normocephalic.  SKIN: Skin normal color, texture and turgor with no lesions or eruptions.  Eyes: PERRL, EOMI.  ENT:  Supple, normal JVP. No lymphadenopathy or thyroid enlargement.  Chest:  CTAB.  Cardiac:  RRR, normal  S1 and S2.  No murmurs rubs or gallop.    Abdomen:  Normal BS.  Soft, non-tender and non-distended.  No rebound or guarding.    Extremities:  Pedious pulses palpable B/L.  No LE edema noticed.   Neurological: Strength and sensation grossly symmetric and intact throughout.         Review of Systems:  Complete review of system is otherwise negative " with the exception of what was described above.     CURRENT MEDICATIONS:    ALPRAZolam  0.5 mg Oral At Bedtime     aspirin  81 mg Oral Daily     atorvastatin  20 mg Oral Daily     cariprazine  3 mg Oral Daily     Clobetasol Propionate   Apply externally Daily     divalproex sodium delayed-release  500 mg Oral At Bedtime     insulin aspart  1-7 Units Subcutaneous TID AC     insulin aspart  1-5 Units Subcutaneous At Bedtime     metFORMIN  1,000 mg Oral BID w/meals     nitroGLYcerin  0.4 mg Sublingual Once     senna-docusate  1 tablet Oral QAM     senna-docusate  2 tablet Oral QPM     sodium chloride (PF)  3 mL Intracatheter Q8H     tamsulosin  0.4 mg Oral BID     PRN Meds: acetaminophen, acetaminophen, albuterol, alum & mag hydroxide-simethicone, glucose **OR** dextrose **OR** glucagon, hydrOXYzine, lidocaine 4%, lidocaine (buffered or not buffered), nitroGLYcerin, sodium chloride (PF)    ALLERGIES     Allergies   Allergen Reactions     Cephalexin Diarrhea     Liraglutide Other (See Comments)     Sulfa Drugs Swelling     Pt has taken  Taken sulfa drugs orally without trouble. He had problems with Sulfa eye drops. Eye swelled up     Victoza      Increased migraine frequency and severity       PAST MEDICAL HISTORY:  Past Medical History:   Diagnosis Date     Acquired lymphedema 2/4/2019     Allergic state      Amyloidosis (H)     followed by hematology Dr. Romeo status post peripheral stem cell transplant     Anxiety 5/11/2016     Anxiety and depression 12/18/2013     Bipolar I disorder (H) 9/1/2015    Under care of psychiatrist NEISHA- Dr Rahman doxepin 25 mg, 2 cap bedtime DULoxetine 20 mg once daily  OLANZapine 7.5 mg  1 tab bedtime      Depressive disorder 1995     Diabetes mellitus (H)     type 2     EKG abnormality 4/20/2020     Guillain Barré syndrome (H)      H/O bone marrow transplant (H)      Headache(784.0)      History of diverticulitis 1/27/2015    1/15-rxed with antibiotics      Hyperlipidemia LDL goal  <100 10/31/2010     Hypertension 2007     Hypertension goal BP (blood pressure) < 140/90 10/11/2011     Marianne (H) 8/20/2015     Morbid obesity (H) 8/28/2018     Myalgia and myositis, unspecified      Narcotic dependence (H) 9/6/2016    None in about 6 months- 8/1/17     NSTEMI (non-ST elevated myocardial infarction) (H) 04/19/2020     Obsessive-compulsive disorders      Obstructive sleep apnea 7/16/2008     OCD (obsessive compulsive disorder)      Other acquired absence of organ 94     Other cirrhosis of liver (H) possibly from vences  4/15/2020     Other specified viral warts      Pain in the abdomen      Peripheral edema 5/20/2018    Noncardiac Continue with  furosemide (LASIX) 20 MG tablet,  potassium       chloride SA (K-DUR/KLOR-CON M) 10 MEQ CR  Tab once daily  Basic metabolic panel     Polyneuropathy associated with underlying disease (H) 2/4/2019     Pure hypercholesterolemia      Renal failure 5/10/2019     Restless legs syndrome (RLS) 6/29/2017     Sleep apnea      Stasis dermatitis of both legs 12/31/2018     Type 2 diabetes mellitus with other specified complication (H) 7/10/2017       PAST SURGICAL HISTORY:  Past Surgical History:   Procedure Laterality Date     ABDOMEN SURGERY  2007    abdominal hernia     BACK SURGERY  8/6/2020     BIOPSY  june 2015    negative     BMT PROTOCOL      for systemic amyloidosis     BONE MARROW BIOPSY, BONE SPECIMEN, NEEDLE/TROCAR N/A 6/8/2016    Procedure: BIOPSY BONE MARROW;  Surgeon: Nathan Agrawal MD;  Location:  GI     BONE MARROW BIOPSY, BONE SPECIMEN, NEEDLE/TROCAR N/A 2/20/2019    Procedure: BIOPSY BONE MARROW;  Surgeon: Michael Raygoza MD;  Location:  GI     CHOLECYSTECTOMY  1995    lap qian     COLONOSCOPY  2012    hx polyps     ESOPHAGOSCOPY, GASTROSCOPY, DUODENOSCOPY (EGD), COMBINED N/A 5/29/2015    Procedure: COMBINED ESOPHAGOSCOPY, GASTROSCOPY, DUODENOSCOPY (EGD), BIOPSY SINGLE OR MULTIPLE;  Surgeon: John Jacob MD;   Location:  GI     HERNIA REPAIR, UMBILICAL  2006     [facility] NONSPECIFIC PROCEDURE  94    Cholecystectomy     [facility] NONSPECIFIC PROCEDURE  2000    repair deviated septum       FAMILY HISTORY:  Family History   Problem Relation Age of Onset     Cerebrovascular Disease Mother      C.A.D. Mother      Hypertension Mother      Alcohol/Drug Mother      Arthritis Mother      Cerebrovascular Disease Maternal Grandmother      C.A.D. Maternal Grandmother      Alcohol/Drug Maternal Grandmother      C.A.D. Father      Heart Disease Father      Hypertension Father      Alcohol/Drug Father      Allergies Father      Circulatory Father      Depression Father      Respiratory Father      Asthma Father      Alcohol/Drug Paternal Grandfather      Cerebrovascular Disease Paternal Grandfather      Depression Son      Psychotic Disorder Son         anxiety disorder     Depression Daughter      Cerebrovascular Disease Maternal Grandfather      Cerebrovascular Disease Paternal Grandmother      Diabetes Brother      Allergies Sister      Depression Sister      Gynecology Sister        SOCIAL HISTORY:  Social History     Socioeconomic History     Marital status:      Spouse name: None     Number of children: 3     Years of education: None     Highest education level: None   Occupational History     Employer: [employer]   Social Needs     Financial resource strain: Not very hard     Food insecurity     Worry: Never true     Inability: Never true     Transportation needs     Medical: No     Non-medical: No   Tobacco Use     Smoking status: Never Smoker     Smokeless tobacco: Never Used   Substance and Sexual Activity     Alcohol use: Not Currently     Alcohol/week: 0.0 standard drinks     Drug use: Not Currently     Types: Cocaine, Marijuana, Methamphetamines, Opiates, IV, Amphetamines, Barbiturates, Benzodiazepines, Codeine, Fentanyl, Hashish, Hydrocodone, Hydromorphone, LSD, Oxycodone     Sexual activity: Not Currently      Partners: Female   Lifestyle     Physical activity     Days per week: 1 day     Minutes per session: None     Stress: None   Relationships     Social connections     Talks on phone: None     Gets together: None     Attends Cheondoism service: None     Active member of club or organization: None     Attends meetings of clubs or organizations: None     Relationship status: None     Intimate partner violence     Fear of current or ex partner: None     Emotionally abused: None     Physically abused: None     Forced sexual activity: None   Other Topics Concern     Parent/sibling w/ CABG, MI or angioplasty before 65F 55M? No   Social History Narrative    Social Documentation:05/27/2010        Balanced Diet: NO    Calcium intake: 3-4 per day    Caffeine: 2 per day    Exercise:  type of activity NO    Sunscreen: Yes    Seatbelts:  Yes    Self Breast Exam:  No - na    Self Testicular Exam: no    Physical/Emotional/Sexual Abuse: No     Do you feel safe in your environment? Yes        Cholesterol screen up to date: Yes    Eye Exam up to date: Yes    Dental Exam up to date: Yes    Pap smear up to date: Does Not Apply    Mammogram up to date: Does Not Apply    Dexa Scan up to date:NO    Colonoscopy up to date:2007    Immunizations up to date: YES    Glucose screen if over 40: Yes        Sofi Rosas CMA                     Recent Lab Results:  Recent Labs   Lab 12/06/20  1035 12/06/20  0607 12/06/20  0230 12/05/20  2205 12/05/20  1517 12/05/20  1517 12/04/20  1130 12/03/20  0930 12/03/20  0930   WBC  --   --   --  4.9  --  4.6 4.2  --   --    HGB  --   --   --  10.9*  --  10.6* 11.6*  --   --    MCV  --   --   --  96  --  95 96  --   --    PLT  --   --   --  103*  --  101* 101*  --   --    NA  --   --   --  140  --  142 140   < > 138   POTASSIUM  --   --   --  4.9  --  5.4* 5.3   < > 6.6*   CHLORIDE  --   --   --  108  --  110* 110*   < > 114*   CO2  --   --   --  27  --  28 24   < > 19*   BUN  --   --   --  19  --  20 28   < >  47*   CR  --   --   --  1.09  --  1.06 1.16   < > 1.73*   ANIONGAP  --   --   --  5  --  4 6   < > 5   LUIS  --   --   --  8.3*  --  8.5 8.4*   < > 8.5   GLC  --   --   --  119*  --  114* 133*   < > 106*   ALBUMIN  --   --   --   --   --   --   --   --  3.3*   PROTTOTAL  --   --   --   --   --   --   --   --  5.8*   BILITOTAL  --   --   --   --   --   --   --   --  0.3   ALKPHOS  --   --   --   --   --   --   --   --  80   ALT  --   --   --   --   --   --   --   --  41   AST  --   --   --   --   --   --   --   --  20   TROPI 0.077* 0.082* 0.087* 0.093*   < > 0.081*  --   --   --     < > = values in this interval not displayed.

## 2020-12-06 NOTE — H&P
[facility]    History and Physical - Hospitalist Service       Date of Admission:  12/5/2020    Assessment & Plan   Parmjit Hernández is a 65 year old male admitted on 12/5/2020. He presents to the emergency department for evaluation of some central chest discomfort as well as ongoing shortness of breath after recent admission, recent ER visit earlier in the day for shortness of breath from which he desired to discharge home.    Atypical chest pain, shortness of breath: Reports on and off chest pain for the past months to year, occasionally exacerbated by activity.  Some central chest discomfort while at rest tonight watching television, shortness of breath preceding onset of chest pain throughout the day.  Improved with nitroglycerin.  There may be a component of anxiety as patient reports feeling quite short of breath prior to oxygen administration, though is saturating normally on room air when oxygen is turned off at bedside and does not have recurrence of symptoms  Troponin elevation: Seems most consistent with demand ischemia given chronic troponin leak.  Note patient underwent cardiology evaluation including cardiac stress MRI in August 2020 for similar level troponin elevations.  Negative regadenoson stress at that time, nonspecific basal septal wall scar not consistent with CAD.  Findings at that time attributed to demand ischemia.  Negative angiogram through outside hospital system in 2018 currently troponin is stable   -Continue aspirin 81 mg daily  -Holding on heparinization as patient's chest pain has essentially resolved and as above, troponin flat  -Cardiac telemetry  -Continue prior to admission Lipitor 20 mg daily  -Cardiology consulted.  I do not anticipate any intervention, though might consider more invasive evaluation of pulmonary hypertension as cause for patient's persistent symptoms.  No evidence of pulmonary hypertension reported on prior  "echocardiograms.  -Unremarkable TTE completed in November 2020, not repeating at this time  -At this point, largely monitoring for recurrence of symptoms while on telemetry and oximetry in order to better isolate what specifically we should be attempting to evaluate further.  Discussed this with patient in the emergency department, and he is understanding of this plan.  Keep patient off of oxygen unless hypoxic (would not administer for \"comfort\").  Discussed this with patient as well.  -Add on BNP given recent discontinuation of diuretic therapies with volume depletion and hypotension as this might be contributing to shortness of breath symptoms    Variable blood pressure: Patient with a history of hypertension as well as intermittent hypotension.  Volume deplete during recent admission 12/3 resulting in hypotension.  Note hyaline casts on UA at that time  At this recent admission for hypotension, metoprolol XL reduced to half dose for bradycardia in the 60s range, Lisinopril held at discharge, hydrochlorothiazide held at discharge.  -Continuing to hold metoprolol XL 50 mg daily given possibility of repeat stress imaging, though I do not anticipate this to be the case  -Continue compression stockings  -A.m. cortisol pending given history of amyloidosis and variable blood pressures, complaints of persistent fatigue.    Type 2 diabetes with peripheral neuropathy: Last hemoglobin A1c of 5.6 on 11/19/2020  -Holding prior to admission Januvia  -Continue prior to admission metformin  -Medium dose sliding scale insulin available as needed    Polyneuropathy: Follows with Dr. Miles through [facility]  He is apparently undergoing evaluation for Guillain-Barré syndrome.  Apparently negative recent work-up for Guillain-Barré, though I am unable to see this record  -trend Negative inspiratory force given now with complaints of shortness of breath    Amyloidosis: Treated with autologous stem cell " transplant at the [facility] approximately 4 years ago.  Currently follows with Dr. Cheatham of oncology.  Not thought to be in recurrence    Cirrhosis secondary to nonalcoholic fatty liver disease: Associated mild thrombocytopenia  -Patient is due for outpatient varices screening with gastroenterology.  He is aware of this.    Chronic back pain: Followed by neuro pain clinic with pain pump in place.  Receiving fentanyl, bupivacaine, and morphine  -Note intrathecal pain pump currently in use  -Continue prior to admission Lyrica 100 mg 3 times daily    Bipolar disorder:  Depression with anxiety:  -Continue prior to admission cariprazine  -Continue prior to admission Depakote    Morbid obesity: Patient with a BMI greater than 41.  Increased risk of all cause mortality and likely contributing to patient's lower extremity lymphedema, obstructive sleep apnea, and what appears to be more chronic shortness of breath.  Obstructive sleep apnea:  -Continue prior to admission CPAP at home settings    BPH:  -Continue Flomax at PTA dosing  -Bladder scan following void x2, straight cath if needed for retention.  Patient reports that he has had normal urodynamics in the past, though note low-volume urine production at bedside during evaluation.    COVID-19 person under investigation: Low suspicion.  Actually had symptomatic Covid swab prior to discharge from earlier ER visit.  This result will apparently be finalized sometime around the noon hour 12/6.  -If negative, remove isolation precautions       Diet:  Cardiac no caffeine diet  DVT Prophylaxis: Ambulate every shift  Parker Catheter: not present  Code Status:  Full code.  Confirmed with patient on admission  Rule Out COVID-19 Handoff:  Parmjit is a LOW SUSPICION PUI.  Follow these instructions:    If COVID test positive -> continue isolation precautions    If COVID test negative -> discontinue COVID-specific isolation precautions       Disposition Plan   Expected discharge:  Tomorrow, recommended to prior living arrangement once Vitally stable, cardiac work-up complete, symptoms improved.  Entered: Jose Flores MD 12/06/2020, 12:11 AM     The patient's care was discussed with the Patient and  in the emergency department.    Jose Flores MD  [facility]  Contact information available via [facility] Paging/Directory      ______________________________________________________________________    Chief Complaint   Central chest discomfort, shortness of breath    History is obtained from patient, chart review, discussion with  in the emergency department    History of Present Illness   Parmjit Hernández is a 65 year old male who presents to the emergency department for evaluation of ongoing shortness of breath as well as an episode of central chest discomfort this evening while watching television.    Patient with a complex past medical history as described above including variable blood pressures with typically hypertension, though most recently volume depletion and hypotension for which he was admitted 12/3/2020, discharged 12/4/2020 with reduction in blood pressure medications including one half dose of his metoprolol XL, lisinopril held given KWABENA, hydrochlorothiazide discontinued.    In November, patient was actually admitted with hypertension, chest discomfort.  Found to have elevated troponins at that time which were attributed to demand ischemia after cardiac stress MRI was completed during hospitalization.  Had some nonspecific basilar scar not entirely consistent with CAD.  Recommended to follow-up with his outpatient cardiologist, Dr. Montague.    As above, patient recently admitted and discharged with blood pressure medication adjustments for hypotension which was symptomatic.  Discharged 12/4, return to the emergency department 12/5 AM with complaints of a brief episode of central chest discomfort as well as shortness of breath.  He was  evaluated emergency department where he was found to have a stably elevated troponin, was not hypoxic.  He actually requested to discharge from the emergency department as his chest discomfort resolved prior to any intervention and he was feeling better, though still with shortness of breath.  He went home, continued to have some shortness of breath, and when watching a football game on the television at approximately 6 PM, had an episode of central chest discomfort.  Since he was told to return if he had any discomfort or pain, return back to the emergency department 12/5 PM.    In the emergency department, patient still with an elevated troponin.  He reports shortness of breath and his symptoms of shortness of breath resolved with supplemental oxygen, though is not hypoxic.  He does not have symptoms of shortness of breath when oxygen is discontinued while I am at bedside.  He tells me that he has intermittently had chest discomfort over the past year or so.  Typically his chest discomfort is more on the left side, over the past day or so it has been central.  Not provoked by anything in particular such as activity or eating.  He tells me that when he had his central chest discomfort he believes he became more short of breath with this, though also has been short of breath throughout the day when chest discomfort was absent.  He has not had any change in his chronic lower extremity swelling, tells me that he has actually lost 10 pounds over the past few weeks with diet and exercise.  Received sublingual nitroglycerin in the emergency department and his chest discomfort resolved.    No orthopnea  No change in lower extremity swelling, though chronically wears compression stockings    Review of Systems    The 10 point Review of Systems is negative other than noted in the HPI or here.  No nausea or vomiting  No diaphoresis  No abdominal pain or diarrhea    Past Medical History    I have reviewed this patient's  medical history and updated it with pertinent information if needed.   Past Medical History:   Diagnosis Date     Acquired lymphedema 2/4/2019     Allergic state      Amyloidosis (H)     followed by hematology Dr. Romeo status post peripheral stem cell transplant     Anxiety 5/11/2016     Anxiety and depression 12/18/2013     Bipolar I disorder (H) 9/1/2015    Under care of psychiatrist [facility]- Dr Rahman doxepin 25 mg, 2 cap bedtime DULoxetine 20 mg once daily  OLANZapine 7.5 mg  1 tab bedtime      Depressive disorder 1995     Diabetes mellitus (H)     type 2     EKG abnormality 4/20/2020     Guillain Barré syndrome (H)      H/O bone marrow transplant (H)      Headache(784.0)      History of diverticulitis 1/27/2015    1/15-rxed with antibiotics      Hyperlipidemia LDL goal <100 10/31/2010     Hypertension 2007     Hypertension goal BP (blood pressure) < 140/90 10/11/2011     Marianne (H) 8/20/2015     Morbid obesity (H) 8/28/2018     Myalgia and myositis, unspecified      Narcotic dependence (H) 9/6/2016    None in about 6 months- 8/1/17     NSTEMI (non-ST elevated myocardial infarction) (H) 04/19/2020     Obsessive-compulsive disorders      Obstructive sleep apnea 7/16/2008     OCD (obsessive compulsive disorder)      Other acquired absence of organ 94     Other cirrhosis of liver (H) possibly from vences  4/15/2020     Other specified viral warts      Pain in the abdomen      Peripheral edema 5/20/2018    Noncardiac Continue with  furosemide (LASIX) 20 MG tablet,  potassium       chloride SA (K-DUR/KLOR-CON M) 10 MEQ CR  Tab once daily  Basic metabolic panel     Polyneuropathy associated with underlying disease (H) 2/4/2019     Pure hypercholesterolemia      Renal failure 5/10/2019     Restless legs syndrome (RLS) 6/29/2017     Sleep apnea      Stasis dermatitis of both legs 12/31/2018     Type 2 diabetes mellitus with other specified complication (H) 7/10/2017       Past Surgical History   I have reviewed this  patient's surgical history and updated it with pertinent information if needed.  Past Surgical History:   Procedure Laterality Date     ABDOMEN SURGERY  2007    abdominal hernia     BACK SURGERY  8/6/2020     BIOPSY  june 2015    negative     BMT PROTOCOL      for systemic amyloidosis     BONE MARROW BIOPSY, BONE SPECIMEN, NEEDLE/TROCAR N/A 6/8/2016    Procedure: BIOPSY BONE MARROW;  Surgeon: Nathan Agrawal MD;  Location:  GI     BONE MARROW BIOPSY, BONE SPECIMEN, NEEDLE/TROCAR N/A 2/20/2019    Procedure: BIOPSY BONE MARROW;  Surgeon: Michael Raygoza MD;  Location:  GI     CHOLECYSTECTOMY  1995    lap qian     COLONOSCOPY  2012    hx polyps     ESOPHAGOSCOPY, GASTROSCOPY, DUODENOSCOPY (EGD), COMBINED N/A 5/29/2015    Procedure: COMBINED ESOPHAGOSCOPY, GASTROSCOPY, DUODENOSCOPY (EGD), BIOPSY SINGLE OR MULTIPLE;  Surgeon: John Jacob MD;  Location:  GI     HERNIA REPAIR, UMBILICAL  2006     [facility] NONSPECIFIC PROCEDURE  94    Cholecystectomy     [facility] NONSPECIFIC PROCEDURE  2000    repair deviated septum       Social History   I have reviewed this patient's social history and updated it with pertinent information if needed.  Social History     Tobacco Use     Smoking status: Never Smoker     Smokeless tobacco: Never Used   Substance Use Topics     Alcohol use: Not Currently     Alcohol/week: 0.0 standard drinks     Drug use: Not Currently     Types: Cocaine, Marijuana, Methamphetamines, Opiates, IV, Amphetamines, Barbiturates, Benzodiazepines, Codeine, Fentanyl, Hashish, Hydrocodone, Hydromorphone, LSD, Oxycodone       Family History   I have reviewed this patient's family history and updated it with pertinent information if needed.  Family History   Problem Relation Age of Onset     Cerebrovascular Disease Mother      C.A.D. Mother      Hypertension Mother      Alcohol/Drug Mother      Arthritis Mother      Cerebrovascular Disease Maternal Grandmother      C.A.D. Maternal Grandmother       Alcohol/Drug Maternal Grandmother      C.A.D. Father      Heart Disease Father      Hypertension Father      Alcohol/Drug Father      Allergies Father      Circulatory Father      Depression Father      Respiratory Father      Asthma Father      Alcohol/Drug Paternal Grandfather      Cerebrovascular Disease Paternal Grandfather      Depression Son      Psychotic Disorder Son         anxiety disorder     Depression Daughter      Cerebrovascular Disease Maternal Grandfather      Cerebrovascular Disease Paternal Grandmother      Diabetes Brother      Allergies Sister      Depression Sister      Gynecology Sister        Prior to Admission Medications   Prior to Admission Medications   Prescriptions Last Dose Informant Patient Reported? Taking?   ALPRAZolam (XANAX) 0.5 MG tablet 12/4/2020 at hs Self Yes Yes   Sig: Take 0.5 mg by mouth At Bedtime   Clobetasol Propionate 0.025 % CREA 12/5/2020 at Unknown time Self Yes Yes   Sig: Externally apply topically daily To calves and legs   NONFORMULARY  Self Yes Yes   Sig: by Intrathecal route continuous - + up to 4 boluses daily (100mcg each bolus usually once or 2x per day)    Managed by Dr Pawan Shaw [facility]     Medications in Pump:  fentanyl 2000mcg/mL  Bupivacaine 20mg/mL  Morphine 5.8mg/mL     Rate:   Fentanyl 1200mcg/day  Bupivacaine 12mg/day  Morphine 4.2mg/day  Pump Last Fill Date:  09/2020   SUMAtriptan (IMITREX) 50 MG tablet prn Self No Yes   Sig: Take 1 tablet (50 mg) by mouth at onset of headache for migraine May repeat in 2 hours. Max 4 tablets/24 hours.   Turmeric 400 MG CAPS 12/5/2020 at am Self Yes Yes   Sig: Take 1,200 mg by mouth daily    acetaminophen (TYLENOL) 325 MG tablet prn Self No Yes   Sig: Take 2 tablets (650 mg) by mouth every 4 hours as needed for mild pain   alpha-lipoic acid 100 MG capsule 12/5/2020 at am Self Yes Yes   Sig: Take 200 mg by mouth daily   aspirin (ASPIRIN LOW DOSE) 81 MG tablet 12/5/2020 at am Self No Yes   Sig: Take  1 tablet (81 mg) by mouth daily   atorvastatin (LIPITOR) 20 MG tablet 12/5/2020 at am Self No Yes   Sig: Take 1 tablet (20 mg) by mouth daily   cariprazine (VRAYLAR) 3 MG CAPS capsule 12/5/2020 at am Self Yes Yes   Sig: Take 3 mg by mouth daily    divalproex sodium delayed-release (DEPAKOTE) 500 MG DR tablet 12/4/2020 at hs Self Yes Yes   Sig: Take 500 mg by mouth At Bedtime    metFORMIN (GLUCOPHAGE-XR) 500 MG 24 hr tablet 12/5/2020 at am Self No Yes   Sig: Take 1000mg in AM and 1000mg in PM   metoprolol succinate ER (TOPROL-XL) 50 MG 24 hr tablet 12/5/2020 at am Self No Yes   Sig: Take 1 tablet (50 mg) by mouth daily   multivitamin w/minerals (THERA-VIT-M) tablet 12/5/2020 at am Self Yes Yes   Sig: Take 1 tablet by mouth daily   pregabalin (LYRICA) 100 MG capsule 12/5/2020 at x1 Self No Yes   Sig: Take 1 capsule (100 mg) by mouth 4 times daily   Patient taking differently: Take 100 mg by mouth 3 times daily    senna-docusate (SENOKOT-S/PERICOLACE) 8.6-50 MG tablet 12/5/2020 at am Self Yes Yes   Sig: Take 1 tablet by mouth every morning Take 1 tablet in the morning and 2 tablets in the evening.    senna-docusate (SENOKOT-S/PERICOLACE) 8.6-50 MG tablet 12/4/2020 at pm Self Yes Yes   Sig: Take 2 tablets by mouth every evening Take 1 tablet in the morning and 2 tablets in the evening.   sitagliptin (JANUVIA) 100 MG tablet 12/5/2020 at am Self No Yes   Sig: Take 1 tablet (100 mg) by mouth daily   tamsulosin (FLOMAX) 0.4 MG capsule 12/5/2020 at am Self No Yes   Sig: Take 1 capsule (0.4 mg) by mouth 2 times daily   vitamin C (ASCORBIC ACID) 1000 MG TABS 12/5/2020 at am Self Yes Yes   Sig: Take 1,000 mg by mouth daily      Facility-Administered Medications: None     Allergies   Allergies   Allergen Reactions     Cephalexin Diarrhea     Liraglutide Other (See Comments)     Sulfa Drugs Swelling     Pt has taken  Taken sulfa drugs orally without trouble. He had problems with Sulfa eye drops. Eye swelled up     Victoza       Increased migraine frequency and severity       Physical Exam   Vital Signs: Temp: 98.1  F (36.7  C) Temp src: Oral BP: 134/86 Pulse: 74   Resp: 20 SpO2: 99 % O2 Device: None (Room air)    Weight: 255 lbs 0 oz    General Appearance: Obese, comfortable appearing 65-year-old male resting in bed in no distress, does not appear ill or toxic  Eyes: No scleral icterus or injection  HEENT: Normocephalic, atraumatic  Respiratory: Breath sounds are clear bilaterally to auscultation without wheezes or crackles.  Good air movement and effort.  No splinting.  Cardiovascular: Regular rate and rhythm.  Distant heart sounds secondary to habitus, however.  GI: Abdomen obese, soft, nontender palpation with no palpable mass.  No guarding.  Lymph/Hematologic: Pitting edema bilateral lower extremities with compression stockings in place.  Genitourinary: External genitalia not examined, patient with small amount, possibly 50 cc, of mirtha-colored urine in urinal at bedside  Skin: No petechiae, no jaundice  Musculoskeletal: Muscular tone intact in all extremities.  No subcutaneous fat wasting  Neurologic: Alert, conversant, appropriate conversation.  Mental status grossly intact.  Able to give a complete history without difficulty  Psychiatric: Normal affect, pleasant.  Does not appear overly anxious.    Data   Data reviewed today: I reviewed all medications, new labs and imaging results over the last 24 hours. I personally reviewed the EKG tracing showing Incomplete right bundle branch block, left anterior fascicular block, old anterior Q waves noted on prior EKGs as well.    Recent Labs   Lab 12/06/20  0230 12/05/20  2205 12/05/20  1730 12/05/20  1517 12/04/20  1130 12/04/20  1130 12/03/20  0930 12/03/20  0930   WBC  --  4.9  --  4.6  --  4.2  --   --    HGB  --  10.9*  --  10.6*  --  11.6*  --   --    MCV  --  96  --  95  --  96  --   --    PLT  --  103*  --  101*  --  101*  --   --    NA  --  140  --  142  --  140   < > 138    POTASSIUM  --  4.9  --  5.4*  --  5.3   < > 6.6*   CHLORIDE  --  108  --  110*  --  110*   < > 114*   CO2  --  27  --  28  --  24   < > 19*   BUN  --  19  --  20  --  28   < > 47*   CR  --  1.09  --  1.06  --  1.16   < > 1.73*   ANIONGAP  --  5  --  4  --  6   < > 5   LUIS  --  8.3*  --  8.5  --  8.4*   < > 8.5   GLC  --  119*  --  114*  --  133*   < > 106*   ALBUMIN  --   --   --   --   --   --   --  3.3*   PROTTOTAL  --   --   --   --   --   --   --  5.8*   BILITOTAL  --   --   --   --   --   --   --  0.3   ALKPHOS  --   --   --   --   --   --   --  80   ALT  --   --   --   --   --   --   --  41   AST  --   --   --   --   --   --   --  20   TROPI 0.087* 0.093* 0.079* 0.081*   < >  --   --   --     < > = values in this interval not displayed.

## 2020-12-06 NOTE — PROGRESS NOTES
[facility]    Hospitalist Progress Note    Assessment & Plan   Parmjit Hernández is a 65 year old male who was admitted on 12/5/2020.     Past medical history significant for Type II DM, Polyneuropathy, hypertension, hyperlipidemia, Obesity, NORMA, chronic pain (low back), BPH, Bipolar D/O, MDD with anxiety, history of Amyloidosis s/p autologous stem cell transplant (2016) and possible Guillain-Barré syndrome.    He represented to the emergency department for evaluation of some central chest discomfort as well as ongoing shortness of breath after recent admission and recent ER visit earlier in the day for shortness of breath from which he desired to discharge home.    Atypical chest pain, shortness of breath: Reports on and off chest pain for the past months to year, occasionally exacerbated by activity.  Some central chest discomfort while at rest tonight watching television, shortness of breath preceding onset of chest pain throughout the day.  Improved with nitroglycerin.  There may be a component of anxiety as patient reports feeling quite short of breath prior to oxygen administration, though is saturating normally on room air when oxygen is turned off at bedside and does not have recurrence of symptoms.  Troponin elevation: Seems most consistent with demand ischemia given chronic troponin leak.  Note patient underwent cardiology evaluation including cardiac stress MRI in August 2020 for similar level troponin elevations.  Negative regadenoson stress at that time, nonspecific basal septal wall scar not consistent with CAD.  Findings at that time attributed to demand ischemia.  Negative angiogram through outside hospital system in 2018 currently troponin is stable.  -.  Inflammatory CRP 4.0.  TSH 3.04.    --Continue aspirin 81 mg daily.  --Holding on heparinization as patient's chest pain has essentially resolved and as above, troponin flat.  --Cardiac telemetry.  --Continue prior to  "admission Lipitor 20 mg daily.  --Cardiology consulted.  Admitting Hospitalist indicated that they do not anticipate any intervention, though might consider more invasive evaluation of pulmonary hypertension as cause for patient's persistent symptoms.  No evidence of pulmonary hypertension reported on prior echocardiograms.  --Unremarkable TTE completed in November 2020, not repeating at this time.  --At this point, largely monitoring for recurrence of symptoms while on telemetry and oximetry in order to better isolate what specifically we should be attempting to evaluate further.  Admitting Hospitalist discussed this with patient in the emergency department, and he is understanding of this plan.  Keep patient off of oxygen unless hypoxic (would not administer for \"comfort\").  Discussed this with patient as well.  --IS and Flutter valve.      Variable blood pressure:   Patient with a history of hypertension as well as intermittent hypotension.  Volume deplete during recent admission 12/3 resulting in hypotension.  Note hyaline casts on UA at that time.  At this recent admission for hypotension, metoprolol XL reduced to half dose for bradycardia in the 60s range, Lisinopril held at discharge, hydrochlorothiazide held at discharge.  --Continuing to hold metoprolol XL 50 mg daily given possibility of repeat stress imaging, though I do not anticipate this to be the case.  --Continue compression stockings.  --AM cortisol pending given history of amyloidosis and variable blood pressures, complaints of persistent fatigue.  --SBP elevated earlier today (180/92) and received 1 dose of SL Nitroglycerin.      Type 2 diabetes with peripheral neuropathy:   Last hemoglobin A1c of 5.6 on 11/19/2020.  --Holding prior to admission Januvia.  --Continue prior to admission metformin.  --Medium dose sliding scale insulin available as needed.  --Hypoglycemia protocol in place.      Polyneuropathy:   Follows with Dr. Miles through " [facility]  He is apparently undergoing evaluation for Guillain-Barré syndrome.  Apparently negative recent work-up for Guillain-Barré, though I am unable to see this record  --Trend Negative inspiratory force given now with complaints of shortness of breath.    Amyloidosis:   Treated with autologous stem cell transplant at the [facility] approximately 4 years ago.  Currently follows with Dr. Cheatham of oncology.  Not thought to be in recurrence    Cirrhosis secondary to nonalcoholic fatty liver disease:   Associated mild thrombocytopenia  -Patient is due for outpatient varices screening with gastroenterology.  He is aware of this.    Chronic back pain: Followed by neuro pain clinic with pain pump in place.  Receiving fentanyl, bupivacaine, and morphine  --Note intrathecal pain pump currently in use.  --Continue prior to admission Lyrica 100 mg 3 times daily.    Bipolar disorder:  Depression with anxiety:  Patient indicated his Depakote medication was recently decreased with plans to start a different/new medication.  Initially this was going to be Lithium, however, patient did not tolerate it and has yet to start any new medication.    Patient indicated that he thinks he would benefit from an antianxiety medication upon arrival/encouter today.    --Continue prior to admission cariprazine.  --Continue prior to admission Depakote.  --Patient was agreeable for Psych consult; order placed.    --PRN Atarax 50 mg TID ordered.      Morbid obesity:   Patient with a BMI greater than 41.  Increased risk of all cause mortality and likely contributing to patient's lower extremity lymphedema, obstructive sleep apnea, and what appears to be more chronic shortness of breath.  Obstructive sleep apnea:  --Continue prior to admission CPAP at home settings.    BPH:  --Continue Flomax at PTA dosing  --Bladder scan following void x2, straight cath if needed for retention.  Patient reports that he has had  normal urodynamics in the past, though note low-volume urine production at bedside during evaluation.    COVID-19 person under investigation:   -Negative PCR 12/5/2020.      Diet: Combination Diet Low Saturated Fat Na <2400mg Diet, No Caffeine Diet     DVT Prophylaxis: Ambulate every shift   Parker Catheter: not present  Code Status: Full Code     Disposition Plan    Expected discharge: Today versus tomorrow, recommended to prior living arrangement once blood pressures and shortness of breath has improved, Cardiology consultation completed.   Entered: Ye Storm PA-C 12/06/2020, 6:34 AM          The patient has been discussed with Dr. Haas, who agrees with the assessment and plan at this time.  Dr. Haas will evaluate the patient independently.      Demario Storm PA-C   [phone]    Interval History   Patient was seated at the edge of his bed upon arrival.  He was looking at his phone and then stated his COVID-19 test was negative.  He denied fever, chills, abdominal pain.  He stated he continues to feel short of breath at rest and with activity.  He stated he did not believe he has had any further chest pain.      He asked why he was feeling short of breath.  Discussed potential causes including pulmonary HTN, worsening anxiety, physical deconditioning.  He indicated that he was thinking a dose of Xanax might help (as he takes this at night).  Discussed trying Atarax for anxiety.  Patient mentioned that his psychiatrist recently decreased his Depakote with plans to add a new medication.  He also stated that they tried lithium but he did not tolerate it.  Discussed and asked if patient would be agreeable for Psych to come and assess and he was ok with this.      Reviewed physical deconditioning and informed him and encouraged him to use the incentive spirometer and flutter valve.      Discussed recent admission with removal of blood pressure medications.  Patient was asked if he has ascites with  his cirrhosis and he said no.  Patient indicated that he was hoping to return home later today/evening.      -Data reviewed today: I reviewed all new labs and imaging results over the last 24 hours. I personally reviewed no images or EKG's today.    Physical Exam   Temp: 97.3  F (36.3  C) Temp src: Oral BP: (!) 141/88 Pulse: 66   Resp: 16 SpO2: 98 % O2 Device: None (Room air)    Vitals:    12/05/20 2213 12/06/20 0134   Weight: 115.7 kg (255 lb) 119.7 kg (264 lb)     Vital Signs with Ranges  Temp:  [97.3  F (36.3  C)-98.8  F (37.1  C)] 97.3  F (36.3  C)  Pulse:  [59-80] 66  Resp:  [9-20] 16  BP: (106-163)/(63-93) 141/88  SpO2:  [93 %-100 %] 98 %  No intake/output data recorded.      Constitutional: Awake, alert, cooperative, no apparent distress.    ENT: Normocephalic, without obvious abnormality, atraumatic, oral pharynx with moist mucus membranes, tonsils without erythema or exudates.  Neck: Supple, symmetrical, trachea midline, no adenopathy.  Pulmonary: No increased work of breathing, good air exchange, clear to auscultation bilaterally, no crackles or wheezing.  Cardiovascular: Regular rate and rhythm, normal S1 and S2, no S3 or S4, and no murmur noted.  GI: Normal bowel sounds, soft, non-distended, non-tender.  Obese.    Skin/Integumen: Clear.  Neuro: CN II-XII grossly intact.  Psych:  Alert and oriented x 3. Anxious affect.  Extremities: No lower extremity edema noted, and calves are non-TTP bilaterally.       Medications       ALPRAZolam  0.5 mg Oral At Bedtime     aspirin  81 mg Oral Daily     atorvastatin  20 mg Oral Daily     cariprazine  3 mg Oral Daily     Clobetasol Propionate   Apply externally Daily     divalproex sodium delayed-release  500 mg Oral At Bedtime     insulin aspart  1-7 Units Subcutaneous TID AC     insulin aspart  1-5 Units Subcutaneous At Bedtime     metFORMIN  1,000 mg Oral BID w/meals     nitroGLYcerin  0.4 mg Sublingual Once     senna-docusate  1 tablet Oral QAM     senna-docusate   2 tablet Oral QPM     sodium chloride (PF)  3 mL Intracatheter Q8H     tamsulosin  0.4 mg Oral BID       Data   Recent Labs   Lab 12/06/20  1035 12/06/20  0607 12/06/20  0230 12/05/20  2205 12/05/20  1517 12/05/20  1517 12/04/20  1130 12/03/20  0930 12/03/20  0930   WBC  --   --   --  4.9  --  4.6 4.2  --   --    HGB  --   --   --  10.9*  --  10.6* 11.6*  --   --    MCV  --   --   --  96  --  95 96  --   --    PLT  --   --   --  103*  --  101* 101*  --   --    NA  --   --   --  140  --  142 140   < > 138   POTASSIUM  --   --   --  4.9  --  5.4* 5.3   < > 6.6*   CHLORIDE  --   --   --  108  --  110* 110*   < > 114*   CO2  --   --   --  27  --  28 24   < > 19*   BUN  --   --   --  19  --  20 28   < > 47*   CR  --   --   --  1.09  --  1.06 1.16   < > 1.73*   ANIONGAP  --   --   --  5  --  4 6   < > 5   LUIS  --   --   --  8.3*  --  8.5 8.4*   < > 8.5   GLC  --   --   --  119*  --  114* 133*   < > 106*   ALBUMIN  --   --   --   --   --   --   --   --  3.3*   PROTTOTAL  --   --   --   --   --   --   --   --  5.8*   BILITOTAL  --   --   --   --   --   --   --   --  0.3   ALKPHOS  --   --   --   --   --   --   --   --  80   ALT  --   --   --   --   --   --   --   --  41   AST  --   --   --   --   --   --   --   --  20   TROPI 0.077* 0.082* 0.087* 0.093*   < > 0.081*  --   --   --     < > = values in this interval not displayed.       Recent Results (from the past 24 hour(s))   XR Chest Port 1 View    Narrative    CHEST ONE VIEW PORTABLE   12/5/2020 5:00 PM     HISTORY:  Chest pain.    COMPARISON: 11/12/2020      Impression    IMPRESSION: Unremarkable single view of the chest.    COLBY BOB MD

## 2020-12-06 NOTE — PROVIDER NOTIFICATION
MD Notification    Notified Person: MD    Notified Person Name: Dr Flores    Notification Date/Time: 0537 12/6/2020    Notification Interaction: web page    Purpose of Notification:275. Erickson Hernández. Admission from earlier. C/O SOB. Sating 96% RA. Lungs sounds clear. No PRN inhalers ordered. Pt asking for albuterol inhaler. Does have inhaler here he got on d/c two days ago. Can Order? Anjali RN *[id number]    Orders Received: albuterol inhaler ordered     Comments:

## 2020-12-06 NOTE — DISCHARGE INSTRUCTIONS
You should utilize your albuterol inhaler 2 puffs as needed every 4-6 hours for shortness of breath.    Discharge Instructions  Chest Pain    You have been seen today for chest pain or discomfort.  At this time, your provider has found no signs that your chest pain is due to a serious or life-threatening condition, (or you have declined more testing and/or admission to the hospital). However, sometimes there is a serious problem that does not show up right away. Your evaluation today may not be complete and you may need further testing and evaluation.     Generally, every Emergency Department visit should have a follow-up clinic visit with either a primary or a specialty clinic/provider. Please follow-up as instructed by your emergency provider today.  Return to the Emergency Department if:  Your chest pain changes, gets worse, starts to happen more often, or comes with less activity.  You are newly short of breath.  You get very weak or tired.  You pass out or faint.  You have any new symptoms, like fever, cough, numb legs, or you cough up blood.  You have anything else that worries you.    Until you follow-up with your regular provider, please do the following:  Take one aspirin daily unless you have an allergy or are told not to by your provider.  If a stress test appointment has been made, go to the appointment.  If you have questions, contact your regular provider.  Follow-up with your regular provider/clinic as directed; this is very important.    If you were given a prescription for medicine here today, be sure to read all of the information (including the package insert) that comes with your prescription.  This will include important information about the medicine, its side effects, and any warnings that you need to know about.  The pharmacist who fills the prescription can provide more information and answer questions you may have about the medicine.  If you have questions or concerns that the pharmacist  cannot address, please call or return to the Emergency Department.       Remember that you can always come back to the Emergency Department if you are not able to see your regular provider in the amount of time listed above, if you get any new symptoms, or if there is anything that worries you.

## 2020-12-06 NOTE — PHARMACY-ADMISSION MEDICATION HISTORY
Pharmacy Medication History  Admission medication history interview status for the 12/5/2020  admission is complete. See EPIC admission navigator for prior to admission medications       Medication history sources: Patient, Surescripts, Patient's home med list and Care Everywhere (patient was discharged 12/4/2020, so was able to reference medication list from that time).  Location of interview: Phone  Adherence Assessment: Good    Significant changes made to the medication list:  - Removed duplicate turmeric entry  - Added clobetasol cream    Additional medication history information:   None    Medication reconciliation completed by provider prior to medication history? No    Time spent in this activity: 20 minutes      Prior to Admission medications    Medication Sig Last Dose Taking? Auth Provider   acetaminophen (TYLENOL) 325 MG tablet Take 2 tablets (650 mg) by mouth every 4 hours as needed for mild pain prn Yes Raphael Silvestre,    alpha-lipoic acid 100 MG capsule Take 200 mg by mouth daily 12/5/2020 at am Yes Reported, Patient   ALPRAZolam (XANAX) 0.5 MG tablet Take 0.5 mg by mouth At Bedtime 12/4/2020 at hs Yes Unknown, Entered By History   aspirin (ASPIRIN LOW DOSE) 81 MG tablet Take 1 tablet (81 mg) by mouth daily 12/5/2020 at am Yes Vince Henry MD   atorvastatin (LIPITOR) 20 MG tablet Take 1 tablet (20 mg) by mouth daily 12/5/2020 at am Yes Bernie Nazario MD   cariprazine (VRAYLAR) 3 MG CAPS capsule Take 3 mg by mouth daily  12/5/2020 at am Yes Reported, Patient   Clobetasol Propionate 0.025 % CREA Externally apply topically daily To calves and legs 12/5/2020 at Unknown time Yes Unknown, Entered By History   divalproex sodium delayed-release (DEPAKOTE) 500 MG DR tablet Take 500 mg by mouth At Bedtime  12/4/2020 at hs Yes Maribeth Ardon MD   metFORMIN (GLUCOPHAGE-XR) 500 MG 24 hr tablet Take 1000mg in AM and 1000mg in PM 12/5/2020 at am Yes Bernie Nazario MD    metoprolol succinate ER (TOPROL-XL) 50 MG 24 hr tablet Take 1 tablet (50 mg) by mouth daily 12/5/2020 at am Yes Donald Lao MD   multivitamin w/minerals (THERA-VIT-M) tablet Take 1 tablet by mouth daily 12/5/2020 at am Yes Unknown, Entered By History   NONFORMULARY by Intrathecal route continuous - + up to 4 boluses daily (100mcg each bolus usually once or 2x per day)    Managed by [facility]     Medications in Pump:  fentanyl 2000mcg/mL  Bupivacaine 20mg/mL  Morphine 5.8mg/mL     Rate:   Fentanyl 1200mcg/day  Bupivacaine 12mg/day  Morphine 4.2mg/day  Pump Last Fill Date:  09/2020  Yes Unknown, Entered By History   pregabalin (LYRICA) 100 MG capsule Take 1 capsule (100 mg) by mouth 4 times daily  Patient taking differently: Take 100 mg by mouth 3 times daily  12/5/2020 at x1 Yes Vince Henry MD   senna-docusate (SENOKOT-S/PERICOLACE) 8.6-50 MG tablet Take 2 tablets by mouth every evening Take 1 tablet in the morning and 2 tablets in the evening. 12/4/2020 at pm Yes Unknown, Entered By History   senna-docusate (SENOKOT-S/PERICOLACE) 8.6-50 MG tablet Take 1 tablet by mouth every morning Take 1 tablet in the morning and 2 tablets in the evening.  12/5/2020 at am Yes Reported, Patient   sitagliptin (JANUVIA) 100 MG tablet Take 1 tablet (100 mg) by mouth daily 12/5/2020 at am Yes Vince Henry MD   SUMAtriptan (IMITREX) 50 MG tablet Take 1 tablet (50 mg) by mouth at onset of headache for migraine May repeat in 2 hours. Max 4 tablets/24 hours. prn Yes Maribeth Ardon MD   tamsulosin (FLOMAX) 0.4 MG capsule Take 1 capsule (0.4 mg) by mouth 2 times daily 12/5/2020 at am Yes Vince Henry MD   Turmeric 400 MG CAPS Take 1,200 mg by mouth daily  12/5/2020 at am Yes Reported, Patient   vitamin C (ASCORBIC ACID) 1000 MG TABS Take 1,000 mg by mouth daily 12/5/2020 at am Yes Reported, Patient       The information provided in this note is only as accurate as the  sources available at the time of the update(s).

## 2020-12-06 NOTE — PLAN OF CARE
Pt is A&O. Reports waxing and waning pain in his chest along with headache pain. EKGs unchanged and normal. States his main concern is his shortness of breath. Reports feeling increasingly weak. LS are clear. O2 saturations stable on RA. Provided comfort and reassurance. Will continue to frequently monitor BP.

## 2020-12-06 NOTE — PLAN OF CARE
VSS. Room air. Tele SR with BBB. Denies SOB or Chest pain since arrival from ED. Up ad paresh in room. Edema to BLE. Daniel skin. Home compression stockings in place. Redness to bilateral groins and blanchable redness to gluteal fold. Post void residuals x2 passed with <60 mL. Plan for cardiac consult today.     0535: Patient called that he was feeling SOB at rest. Had previously ambulated to bathroom without SOB. Pulse ox range between 96%-100% while RN in room. Continuous pulse ox in place. Suggested fan, sitting up in bed or sitting up in chair, and cooler room temperature. Patient declined interventions. Asked for oxygen. RN explained that oxygen saturation was great and that oxygen galilea provide no added benefit at this time. Patient comforted and agreed to call if SOB became worse.

## 2020-12-06 NOTE — ED TRIAGE NOTES
Pt. Was seen here earlier for same SOB. This episode started 3 hrs PTA along w/ mid sternal CP radiates lt. To rt. Denies any otherassociated sx

## 2020-12-06 NOTE — ED NOTES
[facility]  ED Nurse Handoff Report    ED Chief complaint: Chest Pain      ED Diagnosis:   Final diagnoses:   Chest pain, unspecified type   Shortness of breath       Code Status: Full code    Allergies:   Allergies   Allergen Reactions     Cephalexin Diarrhea     Liraglutide Other (See Comments)     Sulfa Drugs Swelling     Pt has taken  Taken sulfa drugs orally without trouble. He had problems with Sulfa eye drops. Eye swelled up     Victoza      Increased migraine frequency and severity       Patient Story: Was sent home from hospital  Yesterday, was seen in the ED for SOB and was told to come back I SOB increased and now having CP  Focused Assessment:  Pt. To ED w/ mid sternal lt to rt. CP w/ SOB. Denies any other sx. Onset 3 hrs PTA    Treatments and/or interventions provided:   Labs Ordered and Resulted from Time of ED Arrival Up to the Time of Departure from the ED   BASIC METABOLIC PANEL - Abnormal; Notable for the following components:       Result Value    Glucose 119 (*)     Calcium 8.3 (*)     All other components within normal limits   CBC WITH PLATELETS DIFFERENTIAL - Abnormal; Notable for the following components:    RBC Count 3.38 (*)     Hemoglobin 10.9 (*)     Hematocrit 32.4 (*)     Platelet Count 103 (*)     Absolute Lymphocytes 0.6 (*)     All other components within normal limits   TROPONIN I - Abnormal; Notable for the following components:    Troponin I ES 0.093 (*)     All other components within normal limits   TSH WITH FREE T4 REFLEX   CARDIAC CONTINUOUS MONITORING     No orders to display     Patient's response to treatments and/or interventions: States feels better  To be done/followed up on inpatient unit: NA    Does this patient have any cognitive concerns?: No    Activity level - Baseline/Home:  Up ad paresh  Activity Level - Current:   Up ad paresh  Patient's Preferred language: English   Needed?: No    Isolation: NA  Infection: NA  Patient tested for COVID 19  "prior to admission: Tested early w/ his first visit this afternoon  Bariatric?: No    Vital Signs:   Vitals:    12/05/20 2138 12/05/20 2213 12/06/20 0007   BP:  (!) 151/93 134/86   Pulse: 71 80 74   Resp: 9 20 20   Temp: 98.8  F (37.1  C) 98.5  F (36.9  C) 98.1  F (36.7  C)   TempSrc: Oral Oral Oral   SpO2: 96% 97% 99%   Weight:  115.7 kg (255 lb)    Height:  1.702 m (5' 7\")        Cardiac Rhythm: SR rate 60    Was the PSS-3 completed: yes  What interventions are required if any?  NA             Family Comments: NA  OBS brochure/video discussed/provided to patient/family: NO              Name of person given brochure if not patient: NA              Relationship to patient: NA    For the majority of the shift this patient's behavior was green  Behavioral interventions performed were NA    ED NURSE PHONE NUMBER: [phone]       "

## 2020-12-06 NOTE — ED NOTES
Bed: ED25  Expected date: 12/5/20  Expected time: 9:15 PM  Means of arrival: Ambulance  Comments:  [address] 65M CP

## 2020-12-06 NOTE — ED PROVIDER NOTES
History     Chief Complaint:  Chest Pain    HPI   Parmjit Hernández is a 65 year old male with a history of NSTEMI, type 2 diabetes, hypertension, hyperlipidemia, coronary artery disease, and chronic pain who presents with chest pain. The patient was seen earlier today by Ger Palomo PA-C with complaints of shortness of breath and chest pain (workup noted below). He was discharged with instructions on how to use home inhalers and reasons to return to the emergency department. The patient states that an hour after discharge (1600) he used two pumps of his inhaler and was at rest watching TV when he developed non-radiating chest pain. Although previous note lists chest pain as a complaint, upon discussion with the patient, he denies any pain prior to 1600. He then called EMS, as chest pain was one of the reasons to return to the emergency department. En route, he was given nitroglycerin and aspirin, which improved his symptoms from a 7/10 to a 4/10. On arrival, he states that the pain is constant, but has not altered his shortness of breath. Denies any headache, vision changes, nausea, vomiting, diarrhea, or abdominal pain. The patient has lymphedema at baseline and denies any change to leg swelling or weight gain. He is not on any diuretics. Of note, the patient was discharged yesterday after an admission (12/3-12/4) due to hypotension and lightheadedness. His antihypertension medications were reduced as a result and he was taken off Lisinopril. He was noted to have a mildly elevated troponin at baseline.      Workup from [facility] emergency department 12/5/2020  ECG (14:54:05):  Rate 73 bpm. SD interval 178. QRS duration 118. QT/QTc 398/438. P-R-T axes 11 -39 -5. Sinus rhythm with occasional premature ventricular complexes. Left axis deviation. Incomplete right bundle branch block. Inferior infarct, age undetermined. Anterolateral infarct, age undetermined. Abnormal ECG. Interpreted at 1457 by Arlen Osorio MD.       Imaging:  Radiographic findings were communicated with the patient who voiced understanding of the findings.  XR Chest 2 Views  Unremarkable single view of the chest.   As read by Radiology.      Laboratory:  CBC: WBC 4.6, HGB 10.6 (L),  (L)  BMP: glucose 114 (H), chloride 110 (H), potassium 5.4 (H) o/w WNL (Creatinine 1.06)     Troponin I (Collected 1517): 0.081 (H)  Troponin I (Collected 1730): 0.079 (H)      BNP: 384     D-dimer: 0.4    Symptomatic COVID-19 Virus by PCR Nasopharyngeal swab: pending     Allergies:  Cephalexin   Liraglutide  Sulfa  Victoza     Medications:    Xanax   Aspirin 81mg   Vraylar   Lipitor   Metoprolol   Depakote   Lyrica   Metformin   Senokot     Past Medical History:    Amyloidosis   NANCY  Depression   Lymphedema   Bipolar I disorder   Type 2 diabetes   Guillain [place] syndrome   Bone marrow transplant   Hyperlipidemia   Hypertension   Marianne   Narcotic dependence   NSTEMI   OCD   NORMA  Cirrhosis of liver   Viral warts   Peripheral edema   Renal failure   Onychomycosis   Thrombocytopenia   Morbid obesity   RLS   Diverticulitis   GERD  Migraines   ADD  Chronic pain   Coronary artery disease   Polyneuropathy     Past Surgical History:    Abdominal hernia - umbilical   Back surgery   BMT protocol   Bone marrow biopsy x2  Cholecystectomy   EGD combines   Repair deviated septum     Family History:    Mother: coronary artery disease, cerebrovascular disease, hypertension, alcohol drug, arthritis   Father: alcohol/drug, coronary artery disease, heart disease, circulatory, depression, asthma  Sister: depression, gynecology   Brother: cerebrovascular disease      Social History:  The patient presents to the emergency department via EMS  [place]   Marital Status:   [4]    Review of Systems   Constitutional: Negative for fever.   Eyes: Negative for visual disturbance.   Respiratory: Positive for shortness of breath.    Cardiovascular: Positive for chest pain. Negative  "for leg swelling.   Gastrointestinal: Negative for abdominal pain, diarrhea, nausea and vomiting.   Neurological: Negative for headaches.   All other systems reviewed and are negative.    Physical Exam     Patient Vitals for the past 24 hrs:   BP Temp Temp src Pulse Resp SpO2 Height Weight   12/05/20 2213 (!) 151/93 98.5  F (36.9  C) Oral 80 20 97 % 1.702 m (5' 7\") 115.7 kg (255 lb)   12/05/20 2138 -- 98.8  F (37.1  C) Oral 71 9 96 % -- --     Physical Exam  General: Alert. Cooperative.  HEENT:  Head:  Atraumatic  Ears:  External ears are normal  Mouth/Throat:  Oropharynx is without erythema or exudate and mucous membranes are moist.   Eyes:   Conjunctivae normal and EOM are normal. No scleral icterus.  CV:  Normal rate, regular rhythm, normal heart sounds and radial pulses are 2+ and symmetric.  No murmur.  Resp:  Breath sounds are clear bilaterally    Non-labored, no retractions or accessory muscle use  GI:  Obese. Abdomen is soft, no distension, no tenderness. No rebound or guarding.    MS:  Normal range of motion. 1+ pitting BLE edema.    Normal strength in all 4 extremities.     Back atraumatic.    No midline cervical, thoracic, or lumbar tenderness  Skin:  Warm and dry.  No rash or lesions noted.  Neuro: Alert. Normal strength.  GCS: 15  Psych:  Normal mood and affect.    Emergency Department Course   ECG (21:34:28):  Rate 71 bpm. AK interval 184. QRS duration 116. QT/QTc 392/425. P-R-T axes 22 -47 -6. Normal sinus rhythm. Incomplete right bundle branch block. Left anterior fascicular block. Inferior infarct, age undetermined. Anterior infarct, age undetermined. Abnormal ECG. No significant change when compared to EKG dated 12/5/2020. Interpreted at 2147 by Pawan White MD.     Laboratory:  Laboratory findings were communicated with the patient who voiced understanding of the findings.    CBC: WBC: 4.9, HGB: 10.9 (L), PLT: 103 (L)  BMP: Glucose 119 (H), Calcium: 8.3 (L), o/w WNL (Creatinine: 1.09)  Troponin " (2205): 0.093 (H)  TSH with Free T4 Reflex: 3.04    Emergency Department Course:  Past medical records, nursing notes, and vitals reviewed.  2136: I performed an exam of the patient and obtained history, as documented above.     EKG was obtained and reviewed in the emergency department.    IV inserted and blood drawn.    I rechecked the patient. I reviewed the results with the Patient and answered all related questions prior to admission.     2307: I discussed the patient with Dr. Flores, of hospitalist.    Findings and plan explained to the Patient who consents to admission. Discussed the patient with Dr. Flores, who will admit the patient to a observation bed for further monitoring, evaluation, and treatment.  Impression & Plan   Covid-19  Parmjit Hernández was evaluated during a global COVID-19 pandemic, which necessitated consideration that the patient might be at risk for infection with the SARS-CoV-2 virus that causes COVID-19.   Applicable protocols for evaluation were followed during the patient's care.   COVID-19 was considered as part of the patient's evaluation. The plan for testing is:  a test was obtained at a previous visit (earlier today) and reviewed & considered today.    Medical Decision Making:  Parmjit Hernández is a 65 year old male with a complex past medical history pertinent for type 2 diabetes, CAD with prior NSTEMI, hypertension, hyperlipidemia, NORMA, cirrhosis, and amyloidosis who re-presents to the emergency department for development of chest pain upon arrival home from an earlier emergency department visit today.  Of note patient was previously seen for progressive shortness of breath since a discharge from the hospital yesterday.  On review of his recent admission, patient apparently had been admitted for symptomatic hypotension in the setting of known hypertension.  There was an adjustment to his antihypertensive medications due to suspicion for pharmacy related hypotension during that  admission.  Additionally he had an acute kidney injury during that admission that appeared to be resolving at time of discharge.  Tonight patient has been having nonspecific shortness of breath and development of new chest pain this evening around 7 PM.  I reviewed his emergency department visit from earlier today and he had had multiple troponin studies obtained approximately 7 hours ago and then again at 5 hours ago both of which were detectable on our assay.  In discussion with the patient it appears he was not having chest pain prior to that ED presentation but did have chest pain this evening.  Onset was approx 7 PM tonight and he states he continues to have chest pain with some mild improvement after nitroglycerin.  Reassuringly his EKG shows no concerning ischemic changes in comparison with prior EKGs, although his troponin remains detectable, although does not appear to be significantly elevated in comparison with prior obtained values earlier this afternoon.  He had already received a full dose aspirin prehospital.  With his known history of amyloidosis he has had a prior cardiac MRI obtained in August 2020 which showed normal LV and RV systolic functions.  There was an area of a nonspecific scar in the basal septal wall.  Reassuringly, no evidence of inducible ischemia on stress perfusion.  In early November of this year, patient was admitted overnight out of concerns for NSTEMI with a detectable troponin in the setting of chest discomfort.  Cardiology saw the patient at that time and thought it was related to type II demand myocardial infarction and angiogram was not obtained during that admission.  He had a repeat echocardiogram which showed no wall motion abnormalities and patient was to follow-up closely with his outpatient cardiologist.  I discussed patient's presentation and prior admissions with the hospitalist, Dr. Flores, and in a shared discussion we elected to hold heparin in the setting of  detectable troponin value, as lower concern for ACS this evening.  Certainly could start heparin at any time if ischemic changes develop and/or future troponins significant elevate.  Patient will be admitted for continued cardiac monitoring and further evaluation under the care of Dr. Flores.  Signed out to Dr. Mtz in ED pending admission.     Diagnosis:    ICD-10-CM    1. Chest pain, unspecified type  R07.9    2. Shortness of breath  R06.02        Disposition:  Admitted to observation.    Adelaide Jones  12/5/2020   [facility] EMERGENCY DEPT  Scribe Disclosure:  I, Adelaide Jones, am serving as a scribe at 9:36 PM on 12/5/2020 to document services personally performed by Pawan White MD based on my observations and the provider's statements to me.       Pawan White MD  12/05/20 0053

## 2020-12-07 ENCOUNTER — APPOINTMENT (OUTPATIENT)
Dept: CARDIOLOGY | Facility: CLINIC | Age: 65
End: 2020-12-07
Attending: INTERNAL MEDICINE
Payer: COMMERCIAL

## 2020-12-07 ENCOUNTER — TELEPHONE (OUTPATIENT)
Dept: FAMILY MEDICINE | Facility: CLINIC | Age: 65
End: 2020-12-07

## 2020-12-07 VITALS
BODY MASS INDEX: 40.46 KG/M2 | RESPIRATION RATE: 18 BRPM | HEART RATE: 68 BPM | OXYGEN SATURATION: 96 % | DIASTOLIC BLOOD PRESSURE: 83 MMHG | TEMPERATURE: 97.4 F | SYSTOLIC BLOOD PRESSURE: 147 MMHG | WEIGHT: 257.8 LBS | HEIGHT: 67 IN

## 2020-12-07 LAB
ANION GAP SERPL CALCULATED.3IONS-SCNC: 3 MMOL/L (ref 3–14)
BUN SERPL-MCNC: 16 MG/DL (ref 7–30)
CALCIUM SERPL-MCNC: 8.8 MG/DL (ref 8.5–10.1)
CHLORIDE SERPL-SCNC: 110 MMOL/L (ref 94–109)
CO2 SERPL-SCNC: 29 MMOL/L (ref 20–32)
CREAT SERPL-MCNC: 0.98 MG/DL (ref 0.66–1.25)
GFR SERPL CREATININE-BSD FRML MDRD: 81 ML/MIN/{1.73_M2}
GLUCOSE BLDC GLUCOMTR-MCNC: 124 MG/DL (ref 70–99)
GLUCOSE BLDC GLUCOMTR-MCNC: 126 MG/DL (ref 70–99)
GLUCOSE BLDC GLUCOMTR-MCNC: 149 MG/DL (ref 70–99)
GLUCOSE SERPL-MCNC: 98 MG/DL (ref 70–99)
POTASSIUM SERPL-SCNC: 4.9 MMOL/L (ref 3.4–5.3)
SODIUM SERPL-SCNC: 142 MMOL/L (ref 133–144)

## 2020-12-07 PROCEDURE — 999N000157 HC STATISTIC RCP TIME EA 10 MIN

## 2020-12-07 PROCEDURE — 93321 DOPPLER ECHO F-UP/LMTD STD: CPT | Mod: 26 | Performed by: INTERNAL MEDICINE

## 2020-12-07 PROCEDURE — 99217 PR OBSERVATION CARE DISCHARGE: CPT | Performed by: PHYSICIAN ASSISTANT

## 2020-12-07 PROCEDURE — 250N000013 HC RX MED GY IP 250 OP 250 PS 637: Performed by: NURSE PRACTITIONER

## 2020-12-07 PROCEDURE — 999N001017 HC STATISTIC GLUCOSE BY METER IP

## 2020-12-07 PROCEDURE — 93325 DOPPLER ECHO COLOR FLOW MAPG: CPT

## 2020-12-07 PROCEDURE — G0378 HOSPITAL OBSERVATION PER HR: HCPCS

## 2020-12-07 PROCEDURE — 36415 COLL VENOUS BLD VENIPUNCTURE: CPT | Performed by: PHYSICIAN ASSISTANT

## 2020-12-07 PROCEDURE — 93325 DOPPLER ECHO COLOR FLOW MAPG: CPT | Mod: 26 | Performed by: INTERNAL MEDICINE

## 2020-12-07 PROCEDURE — 99222 1ST HOSP IP/OBS MODERATE 55: CPT | Performed by: PSYCHIATRY & NEUROLOGY

## 2020-12-07 PROCEDURE — 250N000012 HC RX MED GY IP 250 OP 636 PS 637: Performed by: INTERNAL MEDICINE

## 2020-12-07 PROCEDURE — 255N000002 HC RX 255 OP 636: Performed by: INTERNAL MEDICINE

## 2020-12-07 PROCEDURE — 93308 TTE F-UP OR LMTD: CPT | Mod: 26 | Performed by: INTERNAL MEDICINE

## 2020-12-07 PROCEDURE — 80048 BASIC METABOLIC PNL TOTAL CA: CPT | Performed by: PHYSICIAN ASSISTANT

## 2020-12-07 PROCEDURE — 250N000013 HC RX MED GY IP 250 OP 250 PS 637: Performed by: PSYCHIATRY & NEUROLOGY

## 2020-12-07 PROCEDURE — 99214 OFFICE O/P EST MOD 30 MIN: CPT | Mod: 25 | Performed by: INTERNAL MEDICINE

## 2020-12-07 PROCEDURE — 250N000013 HC RX MED GY IP 250 OP 250 PS 637: Performed by: INTERNAL MEDICINE

## 2020-12-07 PROCEDURE — 94150 VITAL CAPACITY TEST: CPT

## 2020-12-07 PROCEDURE — 96372 THER/PROPH/DIAG INJ SC/IM: CPT | Performed by: INTERNAL MEDICINE

## 2020-12-07 RX ORDER — ALPRAZOLAM 0.5 MG
0.5 TABLET ORAL DAILY PRN
Status: DISCONTINUED | OUTPATIENT
Start: 2020-12-07 | End: 2020-12-07 | Stop reason: HOSPADM

## 2020-12-07 RX ORDER — HYDROCHLOROTHIAZIDE 25 MG/1
25 TABLET ORAL DAILY
Qty: 30 TABLET | Refills: 0 | Status: SHIPPED | OUTPATIENT
Start: 2020-12-07 | End: 2021-01-04

## 2020-12-07 RX ORDER — METOPROLOL SUCCINATE 50 MG/1
50 TABLET, EXTENDED RELEASE ORAL DAILY
Status: DISCONTINUED | OUTPATIENT
Start: 2020-12-07 | End: 2020-12-07 | Stop reason: HOSPADM

## 2020-12-07 RX ORDER — PAROXETINE 10 MG/1
10 TABLET, FILM COATED ORAL DAILY
Qty: 30 TABLET | Refills: 0 | Status: SHIPPED | OUTPATIENT
Start: 2020-12-08 | End: 2021-01-06

## 2020-12-07 RX ORDER — PAROXETINE 10 MG/1
10 TABLET, FILM COATED ORAL DAILY
Status: DISCONTINUED | OUTPATIENT
Start: 2020-12-07 | End: 2020-12-07 | Stop reason: HOSPADM

## 2020-12-07 RX ADMIN — NITROGLYCERIN 0.4 MG: 0.4 TABLET SUBLINGUAL at 09:55

## 2020-12-07 RX ADMIN — ATORVASTATIN CALCIUM 20 MG: 20 TABLET, FILM COATED ORAL at 10:11

## 2020-12-07 RX ADMIN — PREGABALIN 100 MG: 100 CAPSULE ORAL at 16:29

## 2020-12-07 RX ADMIN — HUMAN ALBUMIN MICROSPHERES AND PERFLUTREN 5 ML: 10; .22 INJECTION, SOLUTION INTRAVENOUS at 09:00

## 2020-12-07 RX ADMIN — ACETAMINOPHEN 650 MG: 325 TABLET, FILM COATED ORAL at 05:54

## 2020-12-07 RX ADMIN — ACETAMINOPHEN 650 MG: 325 TABLET, FILM COATED ORAL at 00:51

## 2020-12-07 RX ADMIN — ASPIRIN 81 MG: 81 TABLET, DELAYED RELEASE ORAL at 10:11

## 2020-12-07 RX ADMIN — ALPRAZOLAM 0.5 MG: 0.5 TABLET ORAL at 14:53

## 2020-12-07 RX ADMIN — METOPROLOL SUCCINATE 50 MG: 50 TABLET, EXTENDED RELEASE ORAL at 12:21

## 2020-12-07 RX ADMIN — TAMSULOSIN HYDROCHLORIDE 0.4 MG: 0.4 CAPSULE ORAL at 10:11

## 2020-12-07 RX ADMIN — CARIPRAZINE 3 MG: 1.5 CAPSULE, GELATIN COATED ORAL at 10:11

## 2020-12-07 RX ADMIN — PAROXETINE 10 MG: 10 TABLET, FILM COATED ORAL at 10:11

## 2020-12-07 RX ADMIN — INSULIN ASPART 1 UNITS: 100 INJECTION, SOLUTION INTRAVENOUS; SUBCUTANEOUS at 10:20

## 2020-12-07 RX ADMIN — METFORMIN ER 500 MG 1000 MG: 500 TABLET ORAL at 10:11

## 2020-12-07 RX ADMIN — NITROGLYCERIN 0.4 MG: 0.4 TABLET SUBLINGUAL at 10:01

## 2020-12-07 RX ADMIN — PREGABALIN 100 MG: 100 CAPSULE ORAL at 10:11

## 2020-12-07 ASSESSMENT — MIFFLIN-ST. JEOR: SCORE: 1918

## 2020-12-07 NOTE — PROGRESS NOTES
Patient asking assistance with transportation home and wanted writer to arrange [facility].  Writer shared cost with patient; $75 base fee and approximately $5 per mile.  Patient agreeable to cost but wanted to leave by 5pm.  Next available ride time is 6:30pm.  Patient decided to arrange transportation with [employer] once discharge paperwork reviewed.    Demetria Peraza RN  Care Coordinator  [facility]  [phone]

## 2020-12-07 NOTE — DISCHARGE SUMMARY
[facility]  Hospitalist Discharge Summary      Date of Admission:  12/5/2020  Date of Discharge:  12/7/2020  Discharging Provider: Selam Guy PA-C    Discharge Diagnoses   Atypical chest pain, shortness of breath  Troponin elevation, chronic  Uncontrolled blood pressure    Follow-ups Needed After Discharge   Follow-up Appointments     Follow-up and recommended labs and tests       Follow up with primary care provider, Vince Henry, within 7 days for   hospital follow- up.  The following labs/tests are recommended: Basic   metabolic panel within 1 week of starting hydrochlorothiazide.           Unresulted Labs Ordered in the Past 30 Days of this Admission     No orders found from 11/5/2020 to 12/6/2020.      These results will be followed up by PCP    Discharge Disposition   Discharged to home  Condition at discharge: Stable    Hospital Course   Parmjit Hernández is a 65 year old male who was admitted on 12/5/2020.      Past medical history significant for Type II DM, Polyneuropathy, hypertension, hyperlipidemia, Obesity, NORMA, chronic pain (low back), BPH, Bipolar D/O, MDD with anxiety, history of Amyloidosis s/p autologous stem cell transplant (2016) and possible Guillain-Barré syndrome.     He represented to the emergency department for evaluation of some central chest discomfort as well as ongoing shortness of breath after recent admission and recent ER visit earlier in the day for shortness of breath from which he desired to discharge home.     Atypical chest pain, shortness of breath: Reports on and off chest pain for the past months to year, occasionally exacerbated by activity.  Some central chest discomfort while at rest tonight watching television, shortness of breath preceding onset of chest pain throughout the day.  Improved with nitroglycerin.    Troponin elevation, chronic: Seems most consistent with demand ischemia given chronic troponin leak.  Note  patient underwent cardiology evaluation including cardiac stress MRI in August 2020 for similar level troponin elevations.  Negative regadenoson stress at that time, nonspecific basal septal wall scar not consistent with CAD.  Findings at that time attributed to demand ischemia.  Negative angiogram through outside hospital system in 2018 currently troponin is stable.  -.  Inflammatory CRP 4.0.  TSH 3.04.  Trop peak 0.093 (flat; remainder of CBC and BMP unremarkable. D-dimer 0.4. CXR negative for acute pathology.   --Continue aspirin 81 mg daily, Lipitor 20 mg daily.  --Cardiology consulted, refer to note by Dr. Rico. Follow up echo strain not consistent with amyloid heart and SL nitroglycerin doppler echo is not consistent with IHSS  -- Etiology unclear, no recent musculoskeletal strain, no URI sx, no hx of uncontrolled GERD. Suspect some component of anxiety. Uncontrolled BPs may also be contributing.     Uncontrolled blood pressure:   Patient with a history of hypertension as well as intermittent hypotension.  Volume deplete during recent admission 12/3 resulting in hypotension.  Note hyaline casts on UA at that time.  At this recent admission for hypotension, metoprolol XL reduced to half dose for bradycardia in the 60s range, Lisinopril held at discharge, hydrochlorothiazide held at discharge.  -- Resume metoprolol 50 mg po every day at discharge   -- Start hydrochlorothiazide 25 mg po every day at discharge   -- Pt to monitor and record BPs at home, report values to PCP at follow-up. Repeat BMP within 1 week of starting new medication     Type 2 diabetes with peripheral neuropathy:   Last hemoglobin A1c of 5.6 on 11/19/2020.  --Resume PTA regimen at discharge      Polyneuropathy:   Follows with Dr. Miles through [facility]  He is apparently undergoing evaluation for Guillain-Barré syndrome.  Apparently negative recent work-up for Guillain-Barré, though I am unable to see  this record  --Trend Negative inspiratory force given now with complaints of shortness of breath.     Amyloidosis:   Treated with autologous stem cell transplant at the [facility] approximately 4 years ago.  Currently follows with Dr. Cheatham of oncology.  Not thought to be in recurrence     Cirrhosis secondary to nonalcoholic fatty liver disease:   Associated mild thrombocytopenia  -Patient is due for outpatient varices screening with gastroenterology.  He is aware of this.     Chronic back pain: Followed by neuro pain clinic with pain pump in place.  Receiving fentanyl, bupivacaine, and morphine  --Note intrathecal pain pump currently in use.  --Continue prior to admission Lyrica 100 mg 3 times daily.     Bipolar disorder:  Depression with anxiety:  Patient indicated his Depakote medication was recently decreased with plans to start a different/new medication.  Initially this was going to be Lithium, however, patient did not tolerate it and has yet to start any new medication.    Patient indicated that he thinks he would benefit from an antianxiety medication upon arrival/encouter today.    --Continue prior to admission cariprazine.  --Continue prior to admission Depakote.  --Psych consulted during admission, see note by Dr. yPle, Paxil started and prescribed at discharge. Close outpatient follow-up with Dr. Rahman upon discharge      Morbid obesity:   Patient with a BMI greater than 41.  Increased risk of all cause mortality and likely contributing to patient's lower extremity lymphedema, obstructive sleep apnea, and what appears to be more chronic shortness of breath.    Obstructive sleep apnea: Continue CPAP at discharge      BPH:  --Continue Flomax at PTA dosing     COVID-19 person under investigation:   -Negative PCR 12/5/2020.      Consultations This Hospital Stay   CARDIOLOGY IP CONSULT  PSYCHIATRY IP CONSULT    Code Status   Full Code    Time Spent on this Encounter   Selam SPRINGER  ITZ Guy, personally saw the patient today and spent greater than 30 minutes discharging this patient.       Selam Guy PA-C  [facility]  [address], [address]  [place] [address]  Phone: [phone]  ______________________________________________________________________    Physical Exam   Vital Signs: Temp: 97.4  F (36.3  C) Temp src: Oral BP: (!) 147/83 Pulse: 68   Resp: 18 SpO2: 96 % O2 Device: None (Room air)    Weight: 257 lbs 12.8 oz    CONSTITUTIONAL: Pt laying in bed, dressed in hospital garb. Appears comfortable. Cooperative with interview.   HEENT: Normocephalic, atraumatic. Negative for conjunctival redness or scleral icterus.  Oral mucosa pink and moist; negative for ulcerations, erythema, or exudates.  Dentition in good repair.   CARDIOVASCULAR: RRR, no murmurs, rubs, or extra heart sounds appreciated. Pulses +2/4 and regular in upper and lower extremities, bilaterally.   RESPIRATORY: No increased work of breathing. CTA, bilat; no wheezes, rales, or rhonchi appreciated.  GASTROINTESTINAL:  Abdomen soft, non-distended. BS auscultated in all four quadrants. Negative for tenderness to palpation.  No masses or organomegaly noted.  MUSCULOSKELETAL: No gross deformities noted. Normal muscle tone.   HEMATOLOGIC/LYMPHATIC/IMMUNOLOGIC: No anterior or posterior cervical LAD, bilaterally. Negative for lower extremity edema, bilaterally.  NEUROLOGIC: Alert and oriented to person, place, and time.  strength intact. No focal neuro deficits.   SKIN: Warm, dry, intact. No jaundice noted. Negative for suspicious lesions, rashes, bruising, open sores or abrasions.        Primary Care Physician   Vince Henry    Discharge Orders      Reason for your hospital stay    You were hospitalized for further evaluation of chest pain and shortness of breath.     Follow-up and recommended labs and tests     Follow up with primary care provider, Vince  DIAMANTE Henry, within 7 days for hospital follow- up.  The following labs/tests are recommended: Basic metabolic panel within 1 week of starting hydrochlorothiazide.     Activity    Your activity upon discharge: activity as tolerated     Discharge Instructions    1) hydrochlorothiazide prescribed at discharge to help with blood pressure. Please get labs checked within 1 week of starting to monitor electrolytes   2) Monitor and record blood pressures at least once daily with initiation of new medication, present values to PCP for ongoing dosage adjustment   3) Paxil started per Psychiatry, follow up with Dr. Rahman for ongoing outpatient cares   4) No additional medication changes made   5) Return to the ER with worsening symptoms     Full Code     Diet    Follow this diet upon discharge: Orders Placed This Encounter      Combination Diet Low Saturated Fat Na <2400mg Diet, No Caffeine Diet       Significant Results and Procedures   Most Recent 3 CBC's:  Recent Labs   Lab Test 12/05/20 2205 12/05/20  1517 12/04/20  1130   WBC 4.9 4.6 4.2   HGB 10.9* 10.6* 11.6*   MCV 96 95 96   * 101* 101*     Most Recent 3 BMP's:  Recent Labs   Lab Test 12/07/20  0614 12/05/20 2205 12/05/20  1517    140 142   POTASSIUM 4.9 4.9 5.4*   CHLORIDE 110* 108 110*   CO2 29 27 28   BUN 16 19 20   CR 0.98 1.09 1.06   ANIONGAP 3 5 4   LUIS 8.8 8.3* 8.5   GLC 98 119* 114*     Most Recent 2 LFT's:  Recent Labs   Lab Test 12/03/20  0930 11/15/20  2229   AST 20 23   ALT 41 57   ALKPHOS 80 90   BILITOTAL 0.3 0.4     Most Recent 3 Troponin's:  Recent Labs   Lab Test 12/06/20  1035 12/06/20  0607 12/06/20  0230 01/26/18  1912 01/26/18  1912   TROPI 0.077* 0.082* 0.087*   < >  --    TROPONIN  --   --   --   --  0.00    < > = values in this interval not displayed.     Most Recent 3 BNP's:  Recent Labs   Lab Test 12/05/20 2205 12/05/20  1517 11/12/20  1801 05/25/17  0904 05/25/17  0904   NTBNPI 585 384 96   < >  --    GO  --   --   --   --   589*    < > = values in this interval not displayed.     Most Recent D-dimer:  Recent Labs   Lab Test 20  1517   DD 0.4     Most Recent Cholesterol Panel:  Recent Labs   Lab Test 20  1219   CHOL 163   *   HDL 32*   TRIG 121   ,   Results for orders placed or performed during the hospital encounter of 20   Echocardiogram Limited    Narrative    [id number]  [id number]  [id number]  [id number]^JESSICA^SHU^NAEEM           [facility]  Echocardiography Laboratory  [address] [place] [address]        Name: JULIO PRESCOTT  MRN: [id number]  : 1955  Study Date: 2020 08:37 AM  Age: 65 yrs  Gender: Male  Patient [facility]: [facility]  Reason For Study: SOB, eval for Amyloidosis  Ordering Physician: SHU LOPEZ  Referring Physician: LISSETH GARCIA  Performed By: Nazia Kirk     BSA: 2.2 m2  Height: 67 in  Weight: 257 lb  HR: 63  BP: 151/96 mmHg  _____________________________________________________________________________  __        Procedure  Limited Portable Echo Adult. Optison (NDC #1216-2648) given intravenously.  _____________________________________________________________________________  __        Interpretation Summary     Limited echo. Technically difficult study. Contrast used with some  improvement.  The visual ejection fraction is estimated at 65-70%.  Global peak LV longitudinal strain is averaged at -19%. This is within  reported normal limits (normal <-18%). Strain imaging is not consistent with  typical amyloid cardiomyopathy.  Resting LVOT gradients were requested with NTG. After 0.4 amnd then 0.8 mg of  NTG, EF increased appropriately and became hyperdynamic and LVOT doppler  showed a max peak pressure gradient of 16 mm Hg. There is no EDUARDO.  The right ventricle is normal in size and function.  There is no pericardial effusion.  Dilation of the inferior vena cava is present with normal respiratory  variation in diameter.  IVC diameter and respiratory  changes fall into an intermediate range  suggesting an RA pressure of 8 mmHg.     _____________________________________________________________________________  __        Left Ventricle  The left ventricle is normal in size. There is mild concentric left  ventricular hypertrophy. Left ventricular systolic function is normal. The  visual ejection fraction is estimated at 65-70%. Diastolic Doppler findings  (E/E' ratio and/or other parameters) suggest left ventricular filling  pressures are normal. Global peak LV longitudinal strain is averaged at -19%.  This is within reported normal limits (normal <-18%). No regional wall motion  abnormalities noted.     Right Ventricle  The right ventricle is normal in size and function.     Atria  Normal left atrial size. Right atrial size is normal.     Mitral Valve  The mitral valve is normal in structure and function.        Tricuspid Valve  The tricuspid valve is normal in structure and function. Right ventricular  systolic pressure could not be approximated due to inadequate tricuspid  regurgitation.     Aortic Valve  The aortic valve is not well visualized. No aortic regurgitation is present.  No hemodynamically significant valvular aortic stenosis.     Pulmonic Valve  The pulmonic valve is not well visualized.     Vessels  The aortic root is normal size. Dilation of the inferior vena cava is present  with normal respiratory variation in diameter. IVC diameter and respiratory  changes fall into an intermediate range suggesting an RA pressure of 8 mmHg.     Pericardium  There is no pericardial effusion.     _____________________________________________________________________________  __  MMode/2D Measurements & Calculations  IVSd: 1.4 cm  LVIDd: 5.0 cm  LVIDs: 3.5 cm  LVPWd: 1.3 cm  FS: 29.6 %  LV mass(C)d: 267.0 grams  LV mass(C)dI: 118.7 grams/m2     RWT: 0.51        Doppler Measurements & Calculations  MV E max yassine: 59.2 cm/sec  MV A max yassine: 97.0 cm/sec  MV E/A: 0.61  MV  dec time: 0.26 sec  E/E' av.1  Lateral E/e': 5.0  Medial E/e': 9.1           _____________________________________________________________________________  __           Report approved by: Savannah Castellanos 2020 02:08 PM        *Note: Due to a large number of results and/or encounters for the requested time period, some results have not been displayed. A complete set of results can be found in Results Review.       Discharge Medications   Current Discharge Medication List      START taking these medications    Details   hydrochlorothiazide (HYDRODIURIL) 25 MG tablet Take 1 tablet (25 mg) by mouth daily  Qty: 30 tablet, Refills: 0    Comments: Future refills by PCP Dr. Vince Henry with phone number [phone].  Associated Diagnoses: Hypertension goal BP (blood pressure) < 140/90      PARoxetine (PAXIL) 10 MG tablet Take 1 tablet (10 mg) by mouth daily  Qty: 30 tablet, Refills: 0    Comments: Future refills by PCP Dr. Vince Henry with phone number [phone].  Associated Diagnoses: Anxiety         CONTINUE these medications which have NOT CHANGED    Details   acetaminophen (TYLENOL) 325 MG tablet Take 2 tablets (650 mg) by mouth every 4 hours as needed for mild pain  Qty:      Associated Diagnoses: Generalized muscle weakness      alpha-lipoic acid 100 MG capsule Take 200 mg by mouth daily      ALPRAZolam (XANAX) 0.5 MG tablet Take 0.5 mg by mouth At Bedtime      aspirin (ASPIRIN LOW DOSE) 81 MG tablet Take 1 tablet (81 mg) by mouth daily  Qty: 30 tablet, Refills: 3    Associated Diagnoses: Hyperlipidemia LDL goal <100; Hypertension goal BP (blood pressure) < 140/90      atorvastatin (LIPITOR) 20 MG tablet Take 1 tablet (20 mg) by mouth daily  Qty: 90 tablet, Refills: 1    Associated Diagnoses: Hyperlipidemia LDL goal <100      cariprazine (VRAYLAR) 3 MG CAPS capsule Take 3 mg by mouth daily       Clobetasol Propionate 0.025 % CREA Externally apply topically daily To calves and legs       divalproex sodium delayed-release (DEPAKOTE) 500 MG DR tablet Take 500 mg by mouth At Bedtime   Qty: 10 tablet, Refills: 0    Comments: Under care of Dr KING ([facility] psychistrist)  Associated Diagnoses: Mood disorder (H)      metFORMIN (GLUCOPHAGE-XR) 500 MG 24 hr tablet Take 1000mg in AM and 1000mg in PM  Qty: 360 tablet, Refills: 1    Associated Diagnoses: Type 2 diabetes mellitus with other specified complication, without long-term current use of insulin (H)      metoprolol succinate ER (TOPROL-XL) 50 MG 24 hr tablet Take 1 tablet (50 mg) by mouth daily    Associated Diagnoses: Hypertension goal BP (blood pressure) < 140/90      multivitamin w/minerals (THERA-VIT-M) tablet Take 1 tablet by mouth daily      NONFORMULARY by Intrathecal route continuous - + up to 4 boluses daily (100mcg each bolus usually once or 2x per day)    Managed by Dr Pawan Shaw [facility]     Medications in Pump:  fentanyl 2000mcg/mL  Bupivacaine 20mg/mL  Morphine 5.8mg/mL     Rate:   Fentanyl 1200mcg/day  Bupivacaine 12mg/day  Morphine 4.2mg/day  Pump Last Fill Date:  09/2020      pregabalin (LYRICA) 100 MG capsule Take 1 capsule (100 mg) by mouth 4 times daily  Qty: 360 capsule, Refills: 3    Associated Diagnoses: Peripheral polyneuropathy      !! senna-docusate (SENOKOT-S/PERICOLACE) 8.6-50 MG tablet Take 2 tablets by mouth every evening Take 1 tablet in the morning and 2 tablets in the evening.      !! senna-docusate (SENOKOT-S/PERICOLACE) 8.6-50 MG tablet Take 1 tablet by mouth every morning Take 1 tablet in the morning and 2 tablets in the evening.       sitagliptin (JANUVIA) 100 MG tablet Take 1 tablet (100 mg) by mouth daily  Qty: 90 tablet, Refills: 0    Associated Diagnoses: Type 2 diabetes mellitus with other specified complication, without long-term current use of insulin (H)      SUMAtriptan (IMITREX) 50 MG tablet Take 1 tablet (50 mg) by mouth at onset of headache for migraine May repeat in 2 hours. Max 4  tablets/24 hours.  Qty: 8 tablet, Refills: 1    Associated Diagnoses: Mixed common migraine and muscle contraction headache      tamsulosin (FLOMAX) 0.4 MG capsule Take 1 capsule (0.4 mg) by mouth 2 times daily  Qty: 30 capsule, Refills: 3    Associated Diagnoses: Urinary retention with incomplete bladder emptying      Turmeric 400 MG CAPS Take 1,200 mg by mouth daily       vitamin C (ASCORBIC ACID) 1000 MG TABS Take 1,000 mg by mouth daily       !! - Potential duplicate medications found. Please discuss with provider.        Allergies   Allergies   Allergen Reactions     Cephalexin Diarrhea     Liraglutide Other (See Comments)     Sulfa Drugs Swelling     Pt has taken  Taken sulfa drugs orally without trouble. He had problems with Sulfa eye drops. Eye swelled up     Victoza      Increased migraine frequency and severity

## 2020-12-07 NOTE — PROGRESS NOTES
[facility]    Cardiology Progress Note    Date of Service: 12/07/2020     Assessment & Plan   Parmjit Hernández is a 65 year old male with past medical history of hypertension, hyperlipidemia, diabetes, NORMA, bipolar disorder/anxiety, polyneuropathy/previous Guillain-Barré syndrome and systemic amyloidosis (autologous stem cell transplant in 2016).  This patient was admitted on 12/5/2020 with symptoms of shortness of breath and troponin elevation. Had been admitted 12-4 with hypotension and lisinopril was stopped.    1. Atypical chest pain for months/years and ongoing SOB, unclear etiology  Shortness of breath with elevated troponins 0.087--0-082--0.077-same trops as prior  Normal coronary angiogram 4-2018 at [facility] CMR 8-: EF normal; RV normal patchy delayed enhancement it basal septal wall consistent with non-CAD scar; no ischemia  ekg unchanged incomplete RBBB   BNP normal, crp normal, tsh normal, COVID -; hemoglobin 10.9 (down from 12-13)  CXR negative  Previous echo 11-12 showed normal LVEF and RVEF; no valve disease or pulmonary hypertension    PLAN:  Repeat echo reviewed  Reviewed with Dr. Bhatti  Repeat echo with strain  Repeat full diastology  Give ntg and then check LVOT gradient  FOLLOW UP ECHO STRAIN IS NOT C/W  AMYLOID HEART AND SL NTG DOPPLER ECHO IS NOT C/W IHSS    I CANNOT FIND A DEFINITE CARDIAC REASON FOR SOB OR CP   I WILL SIGN OFF, HE SHOULD Follow-up WITH HIS [place] DOCTORS    2. Hypertension and periods of hypotension  -metoprolol reduced in past and has been on hold here pending possible stress test per hosptilast's note  Off ace and hematocrit  BP now very high  Cortisol level pending  Has edema and renal saw last admission for elevated creat felt pre-renal (they suggested hematocrit if needed)  Restart Metoprolol; we will not need stress test  Add hydrocholorothiazide next as per Nephrology notes from last admit (lymphedema)    3.  Bipolar/anxiety  Psych starting paxil    4. Systemic amyloidosis   (autologous stem cell transplant in 2016)    5. Liver cirrhosis-liver enzymes normal    6. dyslipidemia   Treated on Lipitor 20mg daily  LDL  107  HEIFETZ VISIT    Interval History   Ongoing sob this am at rest; laying flat and appears comfortable  Review of Systems:  The Review of Systems is negative other than noted in the HPI    Physical Exam   Temp: 98.4  F (36.9  C) Temp src: Oral BP: (!) 151/96 Pulse: 68   Resp: 18 SpO2: 97 % O2 Device: None (Room air)    Vitals:    12/05/20 2213 12/06/20 0134 12/07/20 0547   Weight: 115.7 kg (255 lb) 119.7 kg (264 lb) 116.9 kg (257 lb 12.8 oz)     Vital Signs with Ranges  Temp:  [98.2  F (36.8  C)-98.4  F (36.9  C)] 98.4  F (36.9  C)  Pulse:  [60-79] 68  Resp:  [18-20] 18  BP: (124-178)/() 151/96  SpO2:  [94 %-99 %] 97 %  I/O last 3 completed shifts:  In: 120 [P.O.:120]  Out: 550 [Urine:550]    Constitutional     alert and oriented, in no acute distress.     Skin     warm and dry to touch    ENT     no pallor or cyanosis    Neck    Supple, JVP normal, no carotid bruit    Chest     no tenderness to palpation     Lungs  clear to auscultation     Cardiac  Distant tones regular rhythm, S1 normal, S2 normal, No S3 or S4, no murmurs, no rubs    Abdomen     abdomen obese, bowel sounds normoactive, no hepatosplenomegaly    Extremities and Back     no clubbing, cyanosis. Trace edema observed.        Neurological     no gross motor deficits noted, affect appropriate, oriented to time, person and place.        Medications       ALPRAZolam  0.5 mg Oral At Bedtime     aspirin  81 mg Oral Daily     atorvastatin  20 mg Oral Daily     cariprazine  3 mg Oral Daily     Clobetasol Propionate   Apply externally Daily     divalproex sodium delayed-release  500 mg Oral At Bedtime     insulin aspart  1-7 Units Subcutaneous TID AC     insulin aspart  1-5 Units Subcutaneous At Bedtime     metFORMIN  1,000 mg Oral BID w/meals      PARoxetine  10 mg Oral Daily     [COMPLETED] perflutren diluted 1mL to 2mL with saline  5 mL Intravenous Once     pregabalin  100 mg Oral TID     senna-docusate  1 tablet Oral QAM     senna-docusate  2 tablet Oral QPM     sodium chloride (PF)  3 mL Intracatheter Q8H     tamsulosin  0.4 mg Oral BID          Data:     ROUTINE IP LABS (Last four results)  BMP  Recent Labs   Lab 12/07/20  0614 12/05/20 2205 12/05/20 1517 12/04/20  1130    140 142 140   POTASSIUM 4.9 4.9 5.4* 5.3   CHLORIDE 110* 108 110* 110*   LUIS 8.8 8.3* 8.5 8.4*   CO2 29 27 28 24   BUN 16 19 20 28   CR 0.98 1.09 1.06 1.16   GLC 98 119* 114* 133*     CHOLESTEROL/HEPATIC  Recent Labs   Lab 12/03/20  0930   ALT 41   AST 20     CBC  Recent Labs   Lab 12/05/20 2205 12/05/20 1517 12/04/20  1130 12/03/20  0622   WBC 4.9 4.6 4.2 6.2   RBC 3.38* 3.25* 3.57* 3.44*   HGB 10.9* 10.6* 11.6* 11.0*   HCT 32.4* 31.0* 34.2* 33.1*   MCV 96 95 96 96   MCH 32.2 32.6 32.5 32.0   MCHC 33.6 34.2 33.9 33.2   RDW 13.6 13.5 13.8 14.3   * 101* 101* 126*     TROP:   Recent Labs   Lab 12/06/20  1035 12/06/20  0607 12/06/20  0230 12/05/20 2205 12/05/20  1730   TROPI 0.077* 0.082* 0.087* 0.093* 0.079*      BNP:    Recent Labs   Lab 12/05/20 2205 12/05/20  1517   NTBNPI 585 384     INRNo lab results found in last 7 days.  TSH   Date Value Ref Range Status   12/05/2020 3.04 0.40 - 4.00 mU/L Final       EKG results:  Reviewed if available     Imaging:  No results found for this or any previous visit (from the past 24 hour(s)).  Telemetry:  sinus

## 2020-12-07 NOTE — CONSULTS
"Consult Date:  2020      PSYCHIATRY CONSULTATION      REASON FOR CONSULTATION:  Anxiety.      REQUESTING PROVIDER:  Ye Storm PA-C      IDENTIFYING DATA:  Parmjit is a pleasant 65-year-old gentleman with known type 2 bipolar disorder.  He comes in with complaints of chest pain and shortness of breath and is convalescing on the Cardiac Intensive Care Unit.  He is currently being medically evaluated.      CHIEF COMPLAINT:  \"I think I have mostly anxiety.\"      HISTORY OF PRESENT ILLNESS:  The patient is a 65-year-old gentleman with known type 2 bipolar disorder.   He sees Dr. Jose F Rahman, who is his outpatient psychiatrist, currently prescribed Vraylar and a low dose of Depakote.  He has had a lengthy history of mood issues, a sister who  of suicide and probably had bipolar disorder.  He has had a number of other medical problems including a workup for Guillain-[place], hypertension, hyperkalemia amyloidosis requiring a transplant in 2016 along with type 2 diabetes and some liver cirrhosis.  He says with the pandemic, he has been more isolated, though his [employer] business deals has actually been successful.  He sees Dr. Rahman on a monthly basis and also sees a therapist.  He describes a general sense of anxiety.  He uses a small dose of Xanax to sleep at night.  He denies chemical use history.  He reports that he has had some hypomania in the past and has tried a multitude of different psychotropic medications, even lithium, which he did not tolerate.  He has been on Lamictal, Seroquel, Zyprexa, Cymbalta, and a number of other antidepressants.  He denies being overtly depressed and denies any suicidal thinking.  Currently, he is not endorsing any hypomania.  Dr. Rahman was trying to taper down his Depakote and introduce lithium, but he did not tolerate that.      On further questioning, he denies a history of psychosis, panic, OCD, eating disorder history, trauma history or ADHD.  He has never been " "hospitalized for psychiatric reasons and never had ECT.  I saw him many, many years ago in my previous private practice in [place].  We talked about whether adding a small dose of Paxil might target some of his anxiety without disrupting mood regulation.  He is uncertain as to whether Vraylar has been all that helpful, but wants to continue that along with the Depakote.  He says he has great nimesh in Dr. Rahman and plans to see him on an outpatient basis.  There has been reluctance to increase his alprazolam because of his multiple medical issues and age, though he does not have a chemical use history.      PAST PSYCHIATRIC HISTORY:  As above.      PAST CHEMICAL DEPENDENCY HISTORY:  Negative.      PAST MEDICAL HISTORY:  Includes chest pain this admission, hypertension, amyloidosis, status post bone marrow transplant, rule out Guillain-[place] syndrome, type 2 diabetes, liver cirrhosis with splenomegaly, thrombocytopenia, coronary artery disease, peripheral neuropathy, chronic back pain, restless leg syndrome, hypercholesterolemia.      PRIOR TO ADMISSION MEDICATIONS:   1.  Vraylar 3 mg daily.   2.  Tylenol p.r.n.   3.  Alprazolam 0.5 mg at bedtime, 0.5 mg p.r.n.   4.  Lipitor.   5.  Depakote  mg at bedtime.   6.  Glucophage.   7.  Metoprolol.   8.  Multivitamin.   9.  Lyrica 100 mg q.i.d.   10.  Senokot.   11.  Sumatriptan.   12.  Januvia.   13.  Flomax.   14.  Turmeric.   15.   Vitamin C.      FAMILY HISTORY:  Suicide in the patient's sister, who was likely bipolar.      SOCIAL HISTORY:  The patient is originally from [place].  He was  in the last 5 years.  Has grown children.  He works brokering and selling businesses and lives in the [facility] by himself with his \"cat.\"  He denies trauma history.  He came from a family of 6 children and is of [place] descent.      REVIEW OF SYSTEMS:  A 10-point review of systems is currently negative.      MOST RECENT VITAL SIGNS:  Blood pressure 151/96, temperature " 98.4, pulse 68, respiratory rate 18, oxygen saturation 97%.      MENTAL STATUS EXAMINATION:  Appearance:  The patient is a fatigued-appearing gentleman lying in bed.  He is judged to be a good historian.  Speech is somewhat exasperated in terms of intonation.  Use of language appropriate.  Motor exam is calm.  Coordination, station and gait not tested.  Muscle strength and tone adequate.  Affect is constricted.  Mood somewhat anxious.  Thought process logical, coherent and goal-directed.  No loosening of associations or flight of ideas.  No formal thought disorder on exam.  Thought content negative for hallucinations, delusions, paranoia, suicidal or homicidal ideation, but is positive for ongoing anxious cognitions and mild depressive cognitions.  Insight and judgment intact.  Cognitive exam:  The patient is alert and oriented x 3.  Concentration intact.  Recent and remote memory intact.  General fund of knowledge above average.      IMPRESSION:  The patient is a 65-year-old gentleman with type 2 bipolar disorder, most recent episode depressed.  He has some anxious undertones.  Gentle introduction of an antidepressant, targeting anxiety is reasonable.  He has been on many medicines over the years.  We talked about a low dose of Paxil, which has some support in the literature for targeting depression and bipolar disorder, particularly in combination with an atypical antipsychotic, which he is already on.  I think he should see Dr. Rahman fairly soon after his medical discharge to discuss his response to Paxil.  We talked about mood cycling risks and starting the dose very, very low.      DIAGNOSES:   1.  Bipolar disorder, type 2, most recent episode depressed.   2.  Unspecified anxiety disorder.   3.  Multiple medical comorbidities.      PLAN:   1.  Initiate Paxil 10 mg daily, provide a 30-day supply at discharge.   2.  Continue Vraylar, Depakote and Xanax as ordered.  He is using the Xanax sparingly.   3.  Follow  up with Dr. Rahman, his outpatient psychiatrist, within the next few weeks.   He plans to schedule that appointment.   4.  Medical management as you are.  I did discontinue hydroxyzine, given the fact that he can use Xanax with some benefit.  I added a 0.5 mg every day p.r.n. dose.  He has historically used that from time to time for breakthrough anxiety.         OSBALDO TERRAZAS MD             D: 2020   T: 2020   MT: DONALD      Name:     JULIO PRESCOTT   MRN:      [id number]        Account:       [id number]   :      1955           Consult Date:  2020      Document: [id number]

## 2020-12-07 NOTE — PROGRESS NOTES
A&O x4. VSS on room air. Tele SR/ST. Ongoing SOB, unchanged from admission. Denies CP/SOB/pain. Up independently. Added Paxil and hydrochlorothiazide ordered. Pt discharged home via [employer]. AVS reviewed and all questions answered.

## 2020-12-07 NOTE — PLAN OF CARE
Erickson is alert, oriented, pleasant and slightly anxious. He reports intermittent chest pain, resolved spontaneously. Responds well to encouragement and reassurance. Per patient request, minimal assessment overnight to protect  Sleep.    Tylenol x 2 for headache. Reports baseline urinary hesitancy. Up independently. VSS on room air.

## 2020-12-07 NOTE — TELEPHONE ENCOUNTER
Hospital F/U 12/4/2020 DX:Hypotension, Unspecified Hypotension Type, Hypertension Goal Bp (Blood Pressure) < 140/90 ED/IP 4/2    [phone] (home)

## 2020-12-07 NOTE — UTILIZATION REVIEW
"[facility]     Admission Status; Secondary Review Determination     Admission Date: 12/5/2020  9:32 PM      Under the authority of the Utilization Management Committee, the utilization review process indicated a secondary review on the above patient.  The review outcome is based on review of the medical records, discussions with staff, and applying clinical experience noted on the date of the review.          (x) Observation Status Appropriate - This patient does not meet hospital inpatient criteria and is placed in observation status. If this patient's primary payer is Medicare and was admitted as an inpatient, Condition Code 44 should be used and patient status changed to \"observation\".     RATIONALE FOR DETERMINATION   65-year-old male with a complex medical history including hypertension, hyperlipidemia, diabetes, NORMA, bipolar disorder, anxiety, polyneuropathy, systemic amyloidosis status post stem cell transplant was admitted on 12/5 with atypical chest pain and subjective dyspnea.  Troponin levels were mildly elevated and not consistent with acute coronary syndrome.  He has not been hypoxic nor febrile.  No indication of respiratory infection or decompensated CHF.  He was seen in consultation by cardiology and underwent extensive evaluation with no definitive cardiac reason identified for his symptoms.  Psychiatry has evaluated him for anxiety as well.  Patient is hemodynamically stable and at the time of this review there is no medical necessity for inpatient hospitalization and observation status is recommended as already ordered.    The severity of illness, intensity of service provided, expected LOS and risk for adverse outcome make the care appropriate for further observation; however, doesn't meet criteria for hospital inpatient admission.         The information on this document is developed by the utilization review team in order for the business office to ensure compliance.  This only " denotes the appropriateness of proper admission status and does not reflect the quality of care rendered.         The definitions of Inpatient Status and Observation Status used in making the determination above are those provided in the CMS Coverage Manual, Chapter 1 and Chapter 6, section 70.4.      Sincerely,     Ravi Santa DO MPH   Physician Advisor  Utilization Review  [facility]

## 2020-12-07 NOTE — PROVIDER NOTIFICATION
MD Notification    Notified Person: MD    Notified Person Name: Maryam Gomez     Notification Date/Time: 12/7 1437    Notification Interaction: text page     Purpose of Notification: ECHO resulted. Are we keeping pt or discharging from cards standpoint? please advise, thanks     Orders Received: pt okay to discharge from cardiac standpoint     Comments:

## 2020-12-09 LAB — INTERPRETATION ECG - MUSE: NORMAL

## 2020-12-11 ENCOUNTER — OFFICE VISIT (OUTPATIENT)
Dept: FAMILY MEDICINE | Facility: CLINIC | Age: 65
End: 2020-12-11
Payer: COMMERCIAL

## 2020-12-11 ENCOUNTER — MYC MEDICAL ADVICE (OUTPATIENT)
Dept: FAMILY MEDICINE | Facility: CLINIC | Age: 65
End: 2020-12-11

## 2020-12-11 VITALS
SYSTOLIC BLOOD PRESSURE: 117 MMHG | TEMPERATURE: 97 F | BODY MASS INDEX: 40.65 KG/M2 | OXYGEN SATURATION: 96 % | RESPIRATION RATE: 16 BRPM | HEART RATE: 85 BPM | DIASTOLIC BLOOD PRESSURE: 76 MMHG | HEIGHT: 67 IN | WEIGHT: 259 LBS

## 2020-12-11 DIAGNOSIS — I10 HYPERTENSION GOAL BP (BLOOD PRESSURE) < 140/90: Primary | ICD-10-CM

## 2020-12-11 DIAGNOSIS — E87.5 HYPERKALEMIA: ICD-10-CM

## 2020-12-11 DIAGNOSIS — E85.89 OTHER AMYLOIDOSIS (H): ICD-10-CM

## 2020-12-11 DIAGNOSIS — G61.0 GUILLAIN-BARRE SYNDROME (H): ICD-10-CM

## 2020-12-11 DIAGNOSIS — K74.69 OTHER CIRRHOSIS OF LIVER (H): ICD-10-CM

## 2020-12-11 DIAGNOSIS — G47.33 OBSTRUCTIVE SLEEP APNEA: ICD-10-CM

## 2020-12-11 DIAGNOSIS — E11.69 TYPE 2 DIABETES MELLITUS WITH OTHER SPECIFIED COMPLICATION, UNSPECIFIED WHETHER LONG TERM INSULIN USE (H): ICD-10-CM

## 2020-12-11 DIAGNOSIS — E66.01 MORBID OBESITY (H): ICD-10-CM

## 2020-12-11 DIAGNOSIS — E78.5 HYPERLIPIDEMIA LDL GOAL <100: ICD-10-CM

## 2020-12-11 PROBLEM — N19 RENAL FAILURE: Status: RESOLVED | Noted: 2019-05-10 | Resolved: 2020-12-11

## 2020-12-11 PROBLEM — N17.9 ACUTE KIDNEY FAILURE, UNSPECIFIED (H): Status: RESOLVED | Noted: 2020-04-18 | Resolved: 2020-12-11

## 2020-12-11 PROBLEM — R19.7 DIARRHEA OF PRESUMED INFECTIOUS ORIGIN: Status: RESOLVED | Noted: 2020-04-15 | Resolved: 2020-12-11

## 2020-12-11 PROBLEM — R19.7 DIARRHEA: Status: RESOLVED | Noted: 2020-04-15 | Resolved: 2020-12-11

## 2020-12-11 LAB
ALBUMIN SERPL-MCNC: 4 G/DL (ref 3.4–5)
ALP SERPL-CCNC: 92 U/L (ref 40–150)
ALT SERPL W P-5'-P-CCNC: 52 U/L (ref 0–70)
ANION GAP SERPL CALCULATED.3IONS-SCNC: 9 MMOL/L (ref 3–14)
AST SERPL W P-5'-P-CCNC: 25 U/L (ref 0–45)
BILIRUB SERPL-MCNC: 0.6 MG/DL (ref 0.2–1.3)
BUN SERPL-MCNC: 31 MG/DL (ref 7–30)
CALCIUM SERPL-MCNC: 8.9 MG/DL (ref 8.5–10.1)
CHLORIDE SERPL-SCNC: 104 MMOL/L (ref 94–109)
CO2 SERPL-SCNC: 23 MMOL/L (ref 20–32)
CREAT SERPL-MCNC: 1.12 MG/DL (ref 0.66–1.25)
ERYTHROCYTE [DISTWIDTH] IN BLOOD BY AUTOMATED COUNT: 14.6 % (ref 10–15)
GFR SERPL CREATININE-BSD FRML MDRD: 69 ML/MIN/{1.73_M2}
GLUCOSE SERPL-MCNC: 120 MG/DL (ref 70–99)
HCT VFR BLD AUTO: 38.5 % (ref 40–53)
HGB BLD-MCNC: 12.9 G/DL (ref 13.3–17.7)
MCH RBC QN AUTO: 32.3 PG (ref 26.5–33)
MCHC RBC AUTO-ENTMCNC: 33.5 G/DL (ref 31.5–36.5)
MCV RBC AUTO: 97 FL (ref 78–100)
PLATELET # BLD AUTO: 133 10E9/L (ref 150–450)
POTASSIUM SERPL-SCNC: 4.1 MMOL/L (ref 3.4–5.3)
PROT SERPL-MCNC: 7.1 G/DL (ref 6.8–8.8)
RBC # BLD AUTO: 3.99 10E12/L (ref 4.4–5.9)
SODIUM SERPL-SCNC: 136 MMOL/L (ref 133–144)
WBC # BLD AUTO: 7.9 10E9/L (ref 4–11)

## 2020-12-11 PROCEDURE — 80053 COMPREHEN METABOLIC PANEL: CPT | Performed by: FAMILY MEDICINE

## 2020-12-11 PROCEDURE — 36415 COLL VENOUS BLD VENIPUNCTURE: CPT | Performed by: FAMILY MEDICINE

## 2020-12-11 PROCEDURE — 85027 COMPLETE CBC AUTOMATED: CPT | Performed by: FAMILY MEDICINE

## 2020-12-11 PROCEDURE — 99214 OFFICE O/P EST MOD 30 MIN: CPT | Performed by: FAMILY MEDICINE

## 2020-12-11 ASSESSMENT — MIFFLIN-ST. JEOR: SCORE: 1923.45

## 2020-12-11 NOTE — PROGRESS NOTES
"Subjective     Parmjit Hernández is a 65 year old male who presents to clinic today for the following health issues:    HPI         ED/UC Followup:    Facility:  [facility]  Date of visit: 12/5/20  Reason for visit: SOB  Current Status: Stable      The patient presents to clinic for a pos-hospital visit. He presented to the ER 4 times in the last 11 days. His main concern was for chest pain and blood pressure elevations. In today's visit, he states that his chest pain have resolved. His blood pressure is stable. He continues to have intermittently elevated blood pressures (170/94 and 173/102) but denies any hypotensive episodes.   He has a few other concerns today. He states that he is feeling more fatigue than previously. He discontinued the caffeine and the modafinil. Fatigue is not new, it is chronic with a acute worsening.     Also, he has a tingling and tension near his shoulder blade area. Denies any pain. Symptoms started 3 weeks ago.     Seen by Neurology and had LP and EMG. He was informed that he does not have guillain barre syndrome.   Feels that his legs are weak. sometimes makes him feel dizzy.     Urinary hesitancy. Currently taking flomax. He had urodynamic testing 3 months ago.  Review of Systems   Constitutional, HEENT, cardiovascular, pulmonary, gi and gu systems are negative, except as otherwise noted.      Objective    /76 (BP Location: Right arm, Cuff Size: Adult Large)   Pulse 85   Temp 97  F (36.1  C) (Tympanic)   Resp 16   Ht 1.702 m (5' 7\")   Wt 117.5 kg (259 lb)   SpO2 96%   BMI 40.57 kg/m    Body mass index is 40.57 kg/m .  Physical Exam   GENERAL: healthy, alert and no distress  RESP: lungs clear to auscultation - no rales, rhonchi or wheezes  CV: regular rate and rhythm, normal S1 S2, no S3 or S4, no murmur, click or rub, no peripheral edema and peripheral pulses strong    No results found for any visits on 12/11/20.        Assessment & Plan     1. Hypertension goal BP (blood " pressure) < 140/90  Assessment: the patient has a fluctuating blood pressure. He presented to the clinic previously with elevated blood pressures then presented to the ER a few days later with an elevated BP.    -Blood pressure was normal today.  He is currently taking hydrochlorothiazide 25mg once daily, metoprolol 50mg once daily and Advised to continue with current medications for blood pressure.    2. Obstructive sleep apnea  In the previous visit,  He was advised to reduce the dose to 100 mg but he discontinued it. He was advised to restart it a low dose 100mg once daily.     - modafinil (PROVIGIL) 200 MG tablet; Take 1 tablet (200 mg) by mouth daily for 3 days, THEN 0.5 tablets (100 mg) daily for 7 days.  Dispense: 7 tablet; Refill: 0    3. Type 2 diabetes mellitus without complication, without long-term current use of insulin (H)  - Hemoglobin A1c is stable.     4. 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 Psoriasis  The patient has regular follow-up with dermatology.       Return in about 1 month (around 1/11/2021) for Follow-up visit.    Vince Henry MD  [facility]

## 2020-12-11 NOTE — NURSING NOTE
"Chief Complaint   Patient presents with     Hospital F/U     12/5/20     Forms     disability parking certificate     initial /76 (BP Location: Right arm, Cuff Size: Adult Large)   Pulse 85   Temp 97  F (36.1  C) (Tympanic)   Resp 16   Ht 1.702 m (5' 7\")   Wt 117.5 kg (259 lb)   SpO2 96%   BMI 40.57 kg/m   Estimated body mass index is 40.57 kg/m  as calculated from the following:    Height as of this encounter: 1.702 m (5' 7\").    Weight as of this encounter: 117.5 kg (259 lb).  BP completed using cuff size: large.  L  arm      Health Maintenance that is potentially due pending provider review:  NONE    n/a    Sung [employer] ma  "

## 2020-12-30 ENCOUNTER — TRANSFERRED RECORDS (OUTPATIENT)
Dept: HEALTH INFORMATION MANAGEMENT | Facility: CLINIC | Age: 65
End: 2020-12-30

## 2021-01-06 ENCOUNTER — OFFICE VISIT (OUTPATIENT)
Dept: FAMILY MEDICINE | Facility: CLINIC | Age: 66
End: 2021-01-06
Payer: COMMERCIAL

## 2021-01-06 VITALS
SYSTOLIC BLOOD PRESSURE: 108 MMHG | DIASTOLIC BLOOD PRESSURE: 74 MMHG | BODY MASS INDEX: 40.98 KG/M2 | HEIGHT: 67 IN | TEMPERATURE: 96.9 F | OXYGEN SATURATION: 96 % | WEIGHT: 261.1 LBS | HEART RATE: 73 BPM | RESPIRATION RATE: 20 BRPM

## 2021-01-06 DIAGNOSIS — F31.70 BIPOLAR DISORDER IN PARTIAL REMISSION, MOST RECENT EPISODE UNSPECIFIED TYPE (H): ICD-10-CM

## 2021-01-06 DIAGNOSIS — D69.6 THROMBOCYTOPENIA, UNSPECIFIED (H): ICD-10-CM

## 2021-01-06 DIAGNOSIS — E11.9 TYPE 2 DIABETES MELLITUS WITHOUT COMPLICATION, WITHOUT LONG-TERM CURRENT USE OF INSULIN (H): ICD-10-CM

## 2021-01-06 DIAGNOSIS — I10 HYPERTENSION GOAL BP (BLOOD PRESSURE) < 140/90: Primary | ICD-10-CM

## 2021-01-06 DIAGNOSIS — I87.2 VENOUS STASIS DERMATITIS OF BOTH LOWER EXTREMITIES: ICD-10-CM

## 2021-01-06 DIAGNOSIS — E66.01 MORBID OBESITY (H): ICD-10-CM

## 2021-01-06 DIAGNOSIS — E85.89 OTHER AMYLOIDOSIS (H): ICD-10-CM

## 2021-01-06 PROBLEM — I83.009 VENOUS ULCER (H): Status: RESOLVED | Noted: 2020-08-03 | Resolved: 2021-01-06

## 2021-01-06 PROBLEM — K74.69 OTHER CIRRHOSIS OF LIVER (H): Status: RESOLVED | Noted: 2020-04-15 | Resolved: 2021-01-06

## 2021-01-06 PROBLEM — G63 POLYNEUROPATHY ASSOCIATED WITH UNDERLYING DISEASE (H): Status: RESOLVED | Noted: 2019-02-04 | Resolved: 2021-01-06

## 2021-01-06 PROBLEM — L97.909 VENOUS ULCER (H): Status: RESOLVED | Noted: 2020-08-03 | Resolved: 2021-01-06

## 2021-01-06 PROCEDURE — 99214 OFFICE O/P EST MOD 30 MIN: CPT | Performed by: FAMILY MEDICINE

## 2021-01-06 ASSESSMENT — MIFFLIN-ST. JEOR: SCORE: 1932.97

## 2021-01-06 NOTE — PROGRESS NOTES
Only tenderness  Assessment & Plan     Hypertension goal BP (blood pressure) < 140/90  Assessment: the patient's blood pressure has fluctuated significantly over the past few months.   Plan:  - Advised him to continue with the same medications.   - return to clinic if blood pressure is persistently elevated.     Type 2 diabetes mellitus without complication, without long-term current use of insulin (H)  Assessment: well controlled.   Plan:  - Continue with Metformin 1000mg TWICE DAILY.  - A1c 5.6% - 11/19/2020    Bipolar disorder in partial remission, most recent episode unspecified type (H)  Recently started on Vraylar4.5mg PO daily  Continue with Depakote 750mg at bedtime.    Venous stasis dermatitis of both lower extremities  Patient was seen by dermatology. Advised to use Clobetasol as needed.     Other amyloidosis (H)  Routine follow-up with the [facility].   - recent labs are stable.     Thrombocytopenia, unspecified (H)  Stable. Most recent platelet count is 135 on (12/28/2020)    Morbid obesity (H)  Weigh loss recommended.       30 minutes spent on the date of the encounter doing chart review, history and exam, documentation and further activities as noted above       Return in about 3 months (around 4/6/2021).    Vince Henry MD  [facility]     Parmjit Hernández is a 65 year old who presents to clinic today for the following health issues   HPI     Medication Followup of metoprolol 50 mg    Taking Medication as prescribed: yes    Side Effects:  None    Medication Helping Symptoms:  NO      Hypertension Follow-up      Do you check your blood pressure regularly outside of the clinic? Yes     Are you following a low salt diet? No    Are your blood pressures ever more than 140 on the top number (systolic) OR more   than 90 on the bottom number (diastolic), for example 140/90? Yes      How many servings of fruits and vegetables do you eat daily?  2-3    On average, how many  "sweetened beverages do you drink each day (Examples: soda, juice, sweet tea, etc.  Do NOT count diet or artificially sweetened beverages)?   0    How many days per week do you exercise enough to make your heart beat faster? 3 or less    How many minutes a day do you exercise enough to make your heart beat faster? 9 or less    How many days per week do you miss taking your medication? 0    He has been checking his blood pressure two times daily. He is concerned about the fluctuations of his blood pressures. His blood pressure is in the 150's systolic and 90's diastolic. Patient has not taken his medications yet. He denies any chest pain or symptoms during BP elevations.     Also, he was seen by dermatology and was informed that he has venous stasis dermatitis. He was given a prescription of Clobetasol cream.     Edema improved after buying a new compression stockings  Feeling fatigue and wanted to increase his modafinil. Advised to discuss it with psychology.       Review of Systems   Constitutional, HEENT, cardiovascular, pulmonary, gi and gu systems are negative, except as otherwise noted.      Objective    /74 (Patient Position: Sitting, Cuff Size: Adult Regular)   Pulse 73   Temp 96.9  F (36.1  C) (Tympanic)   Resp 20   Ht 1.702 m (5' 7\")   Wt 118.4 kg (261 lb 1.6 oz)   SpO2 96%   BMI 40.89 kg/m    Body mass index is 40.89 kg/m .  Physical Exam   GENERAL: healthy, alert and no distress  RESP: lungs clear to auscultation - no rales, rhonchi or wheezes  CV: regular rate and rhythm, normal S1 S2, no S3 or S4, no murmur, click or rub, no peripheral edema and peripheral pulses strong        "

## 2021-01-08 PROBLEM — K74.69 OTHER CIRRHOSIS OF LIVER (H): Status: ACTIVE | Noted: 2020-04-15

## 2021-01-15 ENCOUNTER — MYC MEDICAL ADVICE (OUTPATIENT)
Dept: FAMILY MEDICINE | Facility: CLINIC | Age: 66
End: 2021-01-15

## 2021-01-21 ENCOUNTER — MYC MEDICAL ADVICE (OUTPATIENT)
Dept: FAMILY MEDICINE | Facility: CLINIC | Age: 66
End: 2021-01-21

## 2021-01-21 NOTE — TELEPHONE ENCOUNTER
"Dear Erickson  I apologize for not responding earlier, somehow your message disappeared from my in-box.     1 - Your last lipid panel was done on 02/24/2020 it is listed as \"Lipid panel reflex to direct LDL Fasting\" (your cholesterol:163, Triglycerides 121, HDL: 32. LDL: 107).  2 - It is believed that the biological activities of curcumin includes - Antimicrobial, antioxidant, antiinflammatory, cardioprotective, neuroprotective, antirheumatic, anti-cancer and hepatoprotective. We do not have long term date or robust randomized controlled trials about alternative therapies and herbal supplements therefore I can not say with great confidence that everyone should be on Tumaric (Curcumin). I do not have a lot of knowledge about Tumaric, therefore I will not be able to tell you how much to take or how it will benefit you specifically.   3 - I would recommend continuing to wear your compression socks. It will help with venous stasis dermatitis and prevent venous ulcers.     Vince Henry MD.    "

## 2021-01-21 NOTE — TELEPHONE ENCOUNTER
Pt would like a call back at [phone] regarding the status of his [employer] messages he has sent..Lucinda Alcazar,

## 2021-02-01 ENCOUNTER — TRANSFERRED RECORDS (OUTPATIENT)
Dept: HEALTH INFORMATION MANAGEMENT | Facility: CLINIC | Age: 66
End: 2021-02-01

## 2021-02-02 DIAGNOSIS — I10 HYPERTENSION GOAL BP (BLOOD PRESSURE) < 140/90: ICD-10-CM

## 2021-02-02 RX ORDER — HYDROCHLOROTHIAZIDE 25 MG/1
25 TABLET ORAL DAILY
Qty: 90 TABLET | Refills: 0 | Status: SHIPPED | OUTPATIENT
Start: 2021-02-02 | End: 2021-03-05

## 2021-02-02 NOTE — TELEPHONE ENCOUNTER
"Requested Prescriptions   Pending Prescriptions Disp Refills     metoprolol succinate ER (TOPROL-XL) 50 MG 24 hr tablet [Pharmacy Med Name: METOPROLOL ER SUCCINATE 50MG TABS] 90 tablet      Sig: TAKE 1 TABLET(50 MG) BY MOUTH DAILY       Beta-Blockers Protocol Passed - 2/2/2021  6:51 AM        Passed - Blood pressure under 140/90 in past 12 months     BP Readings from Last 3 Encounters:   01/06/21 108/74   12/11/20 117/76   12/07/20 (!) 147/83                 Passed - Patient is age 6 or older        Passed - Recent (12 mo) or future (30 days) visit within the authorizing provider's specialty     Patient has had an office visit with the authorizing provider or a provider within the authorizing providers department within the previous 12 mos or has a future within next 30 days. See \"Patient Info\" tab in inbasket, or \"Choose Columns\" in Meds & Orders section of the refill encounter.              Passed - Medication is active on med list            "

## 2021-02-02 NOTE — TELEPHONE ENCOUNTER
Hydrochlorothiazide:   Prescription approved per G Refill Protocol.    Metoprolol:  Routing refill request to provider for review/approval because:  Medication is reported/historical    Stacie MAYA RN

## 2021-02-03 RX ORDER — METOPROLOL SUCCINATE 50 MG/1
TABLET, EXTENDED RELEASE ORAL
Qty: 90 TABLET | Refills: 1 | Status: SHIPPED | OUTPATIENT
Start: 2021-02-03 | End: 2021-03-05

## 2021-02-08 ENCOUNTER — OFFICE VISIT (OUTPATIENT)
Dept: FAMILY MEDICINE | Facility: CLINIC | Age: 66
End: 2021-02-08
Payer: COMMERCIAL

## 2021-02-08 VITALS
SYSTOLIC BLOOD PRESSURE: 128 MMHG | BODY MASS INDEX: 40.73 KG/M2 | WEIGHT: 259.5 LBS | HEIGHT: 67 IN | OXYGEN SATURATION: 96 % | TEMPERATURE: 97.1 F | HEART RATE: 90 BPM | DIASTOLIC BLOOD PRESSURE: 78 MMHG

## 2021-02-08 DIAGNOSIS — M62.89 MUSCLE FATIGUE: ICD-10-CM

## 2021-02-08 DIAGNOSIS — E11.69 TYPE 2 DIABETES MELLITUS WITH OTHER SPECIFIED COMPLICATION, WITHOUT LONG-TERM CURRENT USE OF INSULIN (H): ICD-10-CM

## 2021-02-08 DIAGNOSIS — E78.5 HYPERLIPIDEMIA LDL GOAL <100: ICD-10-CM

## 2021-02-08 DIAGNOSIS — I73.9 PERIPHERAL VASCULAR DISEASE (H): ICD-10-CM

## 2021-02-08 DIAGNOSIS — Z00.00 ROUTINE GENERAL MEDICAL EXAMINATION AT A HEALTH CARE FACILITY: Primary | ICD-10-CM

## 2021-02-08 DIAGNOSIS — I87.2 VENOUS STASIS DERMATITIS OF BOTH LOWER EXTREMITIES: ICD-10-CM

## 2021-02-08 DIAGNOSIS — G47.00 INSOMNIA, UNSPECIFIED TYPE: ICD-10-CM

## 2021-02-08 PROBLEM — K74.69 OTHER CIRRHOSIS OF LIVER (H): Status: RESOLVED | Noted: 2020-04-15 | Resolved: 2021-02-08

## 2021-02-08 PROCEDURE — 36415 COLL VENOUS BLD VENIPUNCTURE: CPT | Performed by: FAMILY MEDICINE

## 2021-02-08 PROCEDURE — 86235 NUCLEAR ANTIGEN ANTIBODY: CPT | Mod: 90 | Performed by: FAMILY MEDICINE

## 2021-02-08 PROCEDURE — 99000 SPECIMEN HANDLING OFFICE-LAB: CPT | Performed by: FAMILY MEDICINE

## 2021-02-08 PROCEDURE — 99214 OFFICE O/P EST MOD 30 MIN: CPT | Mod: 25 | Performed by: FAMILY MEDICINE

## 2021-02-08 PROCEDURE — 83516 IMMUNOASSAY NONANTIBODY: CPT | Mod: 90 | Performed by: FAMILY MEDICINE

## 2021-02-08 PROCEDURE — 99396 PREV VISIT EST AGE 40-64: CPT | Performed by: FAMILY MEDICINE

## 2021-02-08 RX ORDER — METFORMIN HCL 500 MG
TABLET, EXTENDED RELEASE 24 HR ORAL
Qty: 360 TABLET | Refills: 1 | Status: SHIPPED | OUTPATIENT
Start: 2021-02-08 | End: 2021-03-26

## 2021-02-08 RX ORDER — MODAFINIL 200 MG/1
TABLET ORAL
COMMUNITY
Start: 2021-02-03 | End: 2023-02-23

## 2021-02-08 ASSESSMENT — ACTIVITIES OF DAILY LIVING (ADL)
CURRENT_FUNCTION: PREPARING MEALS REQUIRES ASSISTANCE
CURRENT_FUNCTION: LAUNDRY REQUIRES ASSISTANCE
CURRENT_FUNCTION: HOUSEWORK REQUIRES ASSISTANCE

## 2021-02-08 ASSESSMENT — MIFFLIN-ST. JEOR: SCORE: 1925.71

## 2021-02-08 NOTE — PROGRESS NOTES
"3  SUBJECTIVE:   CC: Parmjit Hernández is an 65 year old male who presents for preventive health visit.     Patient has been advised of split billing requirements and indicates understanding: Yes  Healthy Habits:  Answers for HPI/ROS submitted by the patient on 2/8/2021   Annual Exam:  In general, how would you rate your overall physical health?: poor  Frequency of exercise:: None  Do you usually eat at least 4 servings of fruit and vegetables a day, include whole grains & fiber, and avoid regularly eating high fat or \"junk\" foods? : Yes  Taking medications regularly:: Yes  Medication side effects:: None  Activities of Daily Living: preparing meals requires assistance, housework requires assistance, laundry requires assistance  Home safety: lack of grab bars in the bathroom  Hearing Impairment:: difficulty following a conversation in a noisy restaurant or crowded room, difficulty understanding speech on the telephone  In the past 6 months, have you been bothered by leaking of urine?: No  In general, how would you rate your overall mental or emotional health?: fair  Additional concerns today:: Yes    Do you have sleep apnea, excessive snoring or daytime drowsiness?yes    Patient has not been taking Januvia due to concerns about low glucose. He states that his fasting blood glucose is in the 90's. He recently adjusted his diet which might have contributed to his lower blood sugars.   Continues to take He takes Metformin 1000 mg TWICE DAILY.     Lost 11 lbs in 1 month because he changed his diet.     Also, Muscle aches and fatigue and weakness in the lower extremities. Started after the spinal decompression on 08/06/20. Patient states that he was standing outside in 08/20 (two weeks after his procedure) and suddenly his knee gave out and collapsed. Discussed with Spine and they attributed it to dehydration and heat exposure.     Unable to sleep. Stays up until 3 am and sleeps for 2 hours only and then he wakes up to check " his blood glucose, take medications, put on compression socks.       Today's PHQ-2 Score:   PHQ-2 ( 1999 Pfizer) 2/8/2021 5/13/2020   Q1: Little interest or pleasure in doing things 1 1   Q2: Feeling down, depressed or hopeless 1 1   PHQ-2 Score 2 2   Q1: Little interest or pleasure in doing things Several days -   Q2: Feeling down, depressed or hopeless Several days -   PHQ-2 Score 2 -     Abuse: Current or Past(Physical, Sexual or Emotional)- No  Do you feel safe in your environment? Yes    Have you ever done Advance Care Planning? (For example, a Health Directive, POLST, or a discussion with a medical provider or your loved ones about your wishes): Yes, patient states has an Advance Care Planning document and will bring a copy to the clinic.    Social History     Tobacco Use     Smoking status: Never Smoker     Smokeless tobacco: Never Used   Substance Use Topics     Alcohol use: Not Currently     Alcohol/week: 0.0 standard drinks     If you drink alcohol do you typically have >3 drinks per day or >7 drinks per week? No                      Last PSA:   PSA   Date Value Ref Range Status   01/09/2019 0.19 0 - 4 ug/L Final     Comment:     Assay Method:  Chemiluminescence using Siemens Vista analyzer       Reviewed orders with patient. Reviewed health maintenance and updated orders accordingly - Yes  Lab work is in process    Reviewed and updated as needed this visit by clinical staff  Tobacco  Allergies  Meds              Reviewed and updated as needed this visit by Provider                    ROS:  CONSTITUTIONAL: NEGATIVE for fever, chills, change in weight  INTEGUMENTARY/SKIN: NEGATIVE for worrisome rashes, moles or lesions  EYES: NEGATIVE for vision changes or irritation  ENT: NEGATIVE for ear, mouth and throat problems  RESP: NEGATIVE for significant cough or SOB  CV: NEGATIVE for chest pain, palpitations or peripheral edema  GI: NEGATIVE for nausea, abdominal pain, heartburn, or change in bowel habits    "male: negative for dysuria, hematuria, decreased urinary stream, erectile dysfunction, urethral discharge  MUSCULOSKELETAL: NEGATIVE for significant arthralgias or myalgia  NEURO: NEGATIVE for weakness, dizziness or paresthesias  PSYCHIATRIC: NEGATIVE for changes in mood or affect    OBJECTIVE:   /78 (BP Location: Right arm, Cuff Size: Adult Large)   Pulse 90   Temp 97.1  F (36.2  C) (Tympanic)   Ht 1.702 m (5' 7\")   Wt 117.7 kg (259 lb 8 oz)   SpO2 96%   BMI 40.64 kg/m    EXAM:  GENERAL: healthy, alert and no distress  EYES: Eyes grossly normal to inspection, PERRL and conjunctivae and sclerae normal  HENT: ear canals and TM's normal, nose and mouth without ulcers or lesions  RESP: lungs clear to auscultation - no rales, rhonchi or wheezes  CV: regular rate and rhythm, normal S1 S2, no S3 or S4, no murmur, click or rub, no peripheral edema and peripheral pulses strong  MS: no gross musculoskeletal defects noted, no edema  PSYCH: mentation appears normal, affect normal/bright  Diabetic foot exam: normal DP and PT pulses, no trophic changes or ulcerative lesions and normal sensory exam on the right. Diminished heel sensation on the left.     Diagnostic Test Results:  Labs reviewed in Epic    ASSESSMENT/PLAN:   1. Routine general medical examination at a health care facility    2. Type 2 diabetes mellitus with other specified complication, without long-term current use of insulin (H)  Due to diet changes, I would recommend reducing his metformin to 1000 in the morning and 500 in the evening.  - metFORMIN (GLUCOPHAGE-XR) 500 MG 24 hr tablet; Take 1000mg in AM and 500mg in PM  Dispense: 360 tablet; Refill: 1  - blood glucose (NO BRAND SPECIFIED) lancets standard; Use to test blood sugar 1 time daily or as directed.  Dispense: 100 lancet; Refill: 4  - blood glucose (NO BRAND SPECIFIED) test strip; Use to test blood sugar 1 time daily or as directed.  Dispense: 200 strip; Refill: 4    3. Muscle " "fatigue  Assessment: Reports bilateral lower extremity weakness.  Generalized muscle fatigue.  Differential diagnoses includes polymyositis, hypothyroidism, worsening diabetes, anemia, renal disease, Lala Barré. All of which were ruled out except for polymyositits. Other differentials includes fibromyalgia and chronic fatigue syndrome.   Plan:  - Polymyositis and Dermatomyositis Panel  - continue with modafinil.     4. Insomnia, unspecified type  Despite having a history of obstructive sleep apnea he states that he sleeps for an average of 2 to 3 hours per night.  He is unable to stay asleep.  -Advised to schedule appointment with his sleep medicine clinician.    5. Hyperlipidemia LDL goal <100  - Lipid panel reflex to direct LDL Fasting; Future    6. Peripheral vascular disease (H)  long term history of PVD due to diabetes. Sensation intact by monofilment on the right and diminished heel sensation on the left.     7. Venous stasis dermatitis of both lower extremities  Erythema and dryness of both lower extremities. No ulcers present.      Patient has been advised of split billing requirements and indicates understanding: Yes  COUNSELING:  Reviewed preventive health counseling, as reflected in patient instructions       Regular exercise       Healthy diet/nutrition    Estimated body mass index is 40.64 kg/m  as calculated from the following:    Height as of this encounter: 1.702 m (5' 7\").    Weight as of this encounter: 117.7 kg (259 lb 8 oz).    Counseling Resources:  ATP IV Guidelines  Pooled Cohorts Equation Calculator  FRAX Risk Assessment  ICSI Preventive Guidelines  Dietary Guidelines for Americans, 2010  USDA's MyPlate  ASA Prophylaxis  Lung CA Screening    Vince Henry MD  [facility]  "

## 2021-02-08 NOTE — NURSING NOTE
"Chief Complaint   Patient presents with     Physical     initial /78 (BP Location: Right arm, Cuff Size: Adult Large)   Pulse 90   Temp 97.1  F (36.2  C) (Tympanic)   Ht 1.702 m (5' 7\")   Wt 117.7 kg (259 lb 8 oz)   SpO2 96%   BMI 40.64 kg/m   Estimated body mass index is 40.64 kg/m  as calculated from the following:    Height as of this encounter: 1.702 m (5' 7\").    Weight as of this encounter: 117.7 kg (259 lb 8 oz).  BP completed using cuff size: large.  R arm      Health Maintenance that is potentially due pending provider review:  NONE    n/a    Sung Car ma  "

## 2021-02-09 ENCOUNTER — TELEPHONE (OUTPATIENT)
Dept: FAMILY MEDICINE | Facility: CLINIC | Age: 66
End: 2021-02-09

## 2021-02-09 NOTE — TELEPHONE ENCOUNTER
Reason for Call:  Form, our goal is to have forms completed with 72 hours, however, some forms may require a visit or additional information.    Type of letter, form or note:  referring.ordering form     Who is the form from?: [employer] (if other please explain)    Where did the form come from: form was faxed in    What clinic location was the form placed at?: [facility]    Where the form was placed: given to [facility] Box/Folder    What number is listed as a contact on the form?:  [phone]       Additional comments:     Call taken on 2/9/2021 at 10:45 AM by Jarrell Glasgow

## 2021-02-11 VITALS — HEIGHT: 67 IN | BODY MASS INDEX: 38.3 KG/M2 | WEIGHT: 244 LBS

## 2021-02-11 ASSESSMENT — MIFFLIN-ST. JEOR: SCORE: 1855.41

## 2021-02-11 NOTE — PROGRESS NOTES
Erickson is a 65 year old who is being evaluated via a billable telephone visit.      What phone number would you like to be contacted at? [phone]  How would you like to obtain your AVS? Aceandresjoshua          Phone call duration: 30 minutes  Fiona SALGADO CMA, [facility], 2/11/2021 3:05 PM    Obstructive Sleep Apnea - PAP Follow-Up Visit:    Chief Complaint   Patient presents with     CPAP Follow Up       Parmjit Hernández comes in today for follow-up of their severe sleep apnea, managed with CPAP.     PSG on 5/28/2008 showed an AHI of 98.3 per hour and O2 saturation sawyer of 76%.     Medical history is significant for HTN, DM 2, bipolar I disorder, anxiety, OCD, amyloidosis and obesity.     Patient has delayed sleep phase circadian rhythm. He was advised on light therapy at our last meeting a year ago. Patient did not find significant benefit and was not motivated to persist with treatment.     He has difficulty staying asleep due to pain. He wakes up after about 2 hours of falling asleep and cannot return to sleep due to pain. He used to be bale to continue sleeping until 8 am but currently cannot return to sleep. He complains of excessive daytime sleepiness despite Modafinil 300 mg daily.     Overall, he rates the experience with PAP as ok. The mask is comfortable. The mask is not leaking.  He is not snoring with the mask on. He is not having gasp arousals.  He is not having significant oral/nasal dryness. The pressure settings are comfortable.     He uses full-face mask.     Patent has difficulty staying asleep due to pain. He wakes up after 2 hours of sleep due to shoulder pain. He is prescribed Alprazolam 0.5 mg     Bedtime is typically 2 am. Usually it takes about 15 minutes to fall asleep with the mask on. Wake time is typically 7-8 sm.  Patient is using PAP therapy 4 hours per night. The patient is usually getting 5-6 hours of sleep per night.    He works as a  and works from home. He works  until 7 pm.     He does not feel rested in the morning.    Total score - [place]: 21 (2/11/2021  4:34 PM)      Respironics    Auto-PAP 17-20 cmH2O download:  28/30 total days of use. 2 nonuse days.  Average use 4 hours 19 minutes per day.  57% days with >4 hours use.  Large leak 57 minutes per day average. CPAP 90% pressure 19.0 cm. AHI 10.3  (CA-0.1, OA- 5.0, HI: 4.7)       Reviewed by team: Tobacco  Allergies  Meds            Reviewed by provider:                Problem List:  Patient Active Problem List    Diagnosis Date Noted     Peripheral vascular disease (H) 02/08/2021     Priority: Medium     Shortness of breath 12/05/2020     Priority: Medium     Chest pain, unspecified type 12/05/2020     Priority: Medium     Hyperkalemia 12/03/2020     Priority: Medium     Acute kidney injury (H) 12/03/2020     Priority: Medium     Hypotension, unspecified hypotension type 12/03/2020     Priority: Medium     NSTEMI (non-ST elevated myocardial infarction) (H) 11/12/2020     Priority: Medium     Generalized muscle weakness 10/20/2020     Priority: Medium     Bilateral leg weakness 10/20/2020     Priority: Medium     Thrombocytopenia, unspecified (H) 08/03/2020     Priority: Medium     Bilateral leg edema 06/29/2020     Priority: Medium     Onychomycosis 06/29/2020     Priority: Medium     EKG abnormality 04/20/2020     Priority: Medium     Low blood pressure reading 05/16/2019     Priority: Medium     Acquired lymphedema 02/04/2019     Priority: Medium     Venous stasis dermatitis of both lower extremities 12/31/2018     Priority: Medium     Morbid obesity (H) 08/28/2018     Priority: Medium     Peripheral edema 05/20/2018     Priority: Medium     Noncardiac  Continue with  furosemide (LASIX) 20 MG tablet,   potassium       chloride SA (K-DUR/KLOR-CON M) 10 MEQ CR  Tab once daily   Basic metabolic panel       Obstructive sleep apnea syndrome 06/29/2017     Priority: Medium     sleep study       Restless legs syndrome  "(RLS) 06/29/2017     Priority: Medium     History of peripheral stem cell transplant (H) 12/16/2016     Priority: Medium     Insomnia due to medical condition 08/23/2016     Priority: Medium     Other amyloidosis (H) 06/01/2016     Priority: Medium      #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 7/3/2018      Dr [id number]       Chronic abdominal pain 03/15/2016     Priority: Medium     Bipolar I disorder (H) 09/01/2015     Priority: Medium     Under care of psychiatrist [facility]- [facility] Rahman  doxepin 25 mg, 2 cap bedtime  DULoxetine 20 mg once daily   OLANZapine 7.5 mg  1 tab bedtime        Marianne (H) 08/20/2015     Priority: Medium     MENTAL HEALTH 08/17/2015     Priority: Medium     History of diverticulitis 01/27/2015     Priority: Medium     1/15-rxed with antibiotics       Anxiety and depression 12/18/2013     Priority: Medium     Advanced directives, counseling/discussion 05/29/2012     Priority: Medium     Discussed advance care planning with patient; information given to patient to review. 5/29/2012     Honoring choices letter mailed. 7-  Lynsey Bustamante, RT (R)         Migraine 05/29/2012     Priority: Medium     Hypertension goal BP (blood pressure) < 140/90 10/11/2011     Priority: Medium     Hyperlipidemia LDL goal <100 10/31/2010     Priority: Medium          Ht 1.702 m (5' 7\")   Wt 110.7 kg (244 lb)   BMI 38.22 kg/m      Impression/Plan:     Severe Obstructive sleep apnea.     -  He tolerates CPAP but unable to maintain sleep during the night due to pain. There is some excess leak and residual AHI is high. He complains of daytime sleepiness.     Plan:     1. A titration PSG is recommended for optimization of PAP titration     Insomnia    - insomnia is multifactorial, contributed by pain, mood disorder and delayed sleep phase. Patient wants to consider hypnotic medication to help sleep maintenance and see if daytime dysfunction " will improve. He previously had sleep eating and complex sleep behaviors with Zolpidem and wants to stay away from benzodiazepine receptor agonists. He had a positive response to Trazodone and will discuss starting this with his Psychiatrist. If Trazodone is not an options, we can consider Suvorexant.     Parmjit Hernández will follow up after sleep study.     Thirty minutes spent with patient, all of which were spent  counseling, consulting, coordinating plan of care.      Ang Rodrigues MD    CC:  Vince Henry,

## 2021-02-11 NOTE — PATIENT INSTRUCTIONS
Your BMI is Body mass index is 38.22 kg/m .  Weight management is a personal decision.  If you are interested in exploring weight loss strategies, the following discussion covers the approaches that may be successful. Body mass index (BMI) is one way to tell whether you are at a healthy weight, overweight, or obese. It measures your weight in relation to your height.  A BMI of 18.5 to 24.9 is in the healthy range. A person with a BMI of 25 to 29.9 is considered overweight, and someone with a BMI of 30 or greater is considered obese. More than two-thirds of American adults are considered overweight or obese.  Being overweight or obese increases the risk for further weight gain. Excess weight may lead to heart disease and diabetes.  Creating and following plans for healthy eating and physical activity may help you improve your health.  Weight control is part of healthy lifestyle and includes exercise, emotional health, and healthy eating habits. Careful eating habits lifelong are the mainstay of weight control. Though there are significant health benefits from weight loss, long-term weight loss with diet alone may be very difficult to achieve- studies show long-term success with dietary management in less than 10% of people. Attaining a healthy weight may be especially difficult to achieve in those with severe obesity. In some cases, medications, devices and surgical management might be considered.  What can you do?  If you are overweight or obese and are interested in methods for weight loss, you should discuss this with your provider.     Consider reducing daily calorie intake by 500 calories.     Keep a food journal.     Avoiding skipping meals, consider cutting portions instead.    Diet combined with exercise helps maintain muscle while optimizing fat loss. Strength training is particularly important for building and maintaining muscle mass. Exercise helps reduce stress, increase energy, and improves fitness.  Increasing exercise without diet control, however, may not burn enough calories to loose weight.       Start walking three days a week 10-20 minutes at a time    Work towards walking thirty minutes five days a week     Eventually, increase the speed of your walking for 1-2 minutes at time    In addition, we recommend that you review healthy lifestyles and methods for weight loss available through the National Institutes of Health patient information sites:  http://win.niddk.nih.gov/publications/index.htm    And look into health and wellness programs that may be available through your health insurance provider, employer, local community center, or ronald club.    Weight management plan: Discussed healthy diet and exercise guidelines        Your Body mass index is 38.22 kg/m .  Weight management is a personal decision.  If you are interested in exploring weight loss strategies, the following discussion covers the approaches that may be successful. Body mass index (BMI) is one way to tell whether you are at a healthy weight, overweight, or obese. It measures your weight in relation to your height.  A BMI of 18.5 to 24.9 is in the healthy range. A person with a BMI of 25 to 29.9 is considered overweight, and someone with a BMI of 30 or greater is considered obese. More than two-thirds of American adults are considered overweight or obese.  Being overweight or obese increases the risk for further weight gain. Excess weight may lead to heart disease and diabetes.  Creating and following plans for healthy eating and physical activity may help you improve your health.  Weight control is part of healthy lifestyle and includes exercise, emotional health, and healthy eating habits. Careful eating habits lifelong are the mainstay of weight control. Though there are significant health benefits from weight loss, long-term weight loss with diet alone may be very difficult to achieve- studies show long-term success with dietary  management in less than 10% of people. Attaining a healthy weight may be especially difficult to achieve in those with severe obesity. In some cases, medications, devices and surgical management might be considered.  What can you do?  If you are overweight or obese and are interested in methods for weight loss, you should discuss this with your provider.     Consider reducing daily calorie intake by 500 calories.     Keep a food journal.     Avoiding skipping meals, consider cutting portions instead.    Diet combined with exercise helps maintain muscle while optimizing fat loss. Strength training is particularly important for building and maintaining muscle mass. Exercise helps reduce stress, increase energy, and improves fitness. Increasing exercise without diet control, however, may not burn enough calories to loose weight.       Start walking three days a week 10-20 minutes at a time    Work towards walking thirty minutes five days a week     Eventually, increase the speed of your walking for 1-2 minutes at time    In addition, we recommend that you review healthy lifestyles and methods for weight loss available through the National Institutes of Health patient information sites:  http://win.niddk.nih.gov/publications/index.htm    And look into health and wellness programs that may be available through your health insurance provider, employer, local community center, or ronald club.    {Weight Management Plan -- Delete if patient has a normal BMI:691308}

## 2021-02-12 ENCOUNTER — VIRTUAL VISIT (OUTPATIENT)
Dept: SLEEP MEDICINE | Facility: CLINIC | Age: 66
End: 2021-02-12
Payer: COMMERCIAL

## 2021-02-12 DIAGNOSIS — G47.19 EXCESSIVE DAYTIME SLEEPINESS: Primary | ICD-10-CM

## 2021-02-12 DIAGNOSIS — E11.69 TYPE 2 DIABETES MELLITUS WITH OTHER SPECIFIED COMPLICATION, WITHOUT LONG-TERM CURRENT USE OF INSULIN (H): ICD-10-CM

## 2021-02-12 DIAGNOSIS — F51.04 INSOMNIA, PSYCHOPHYSIOLOGICAL: ICD-10-CM

## 2021-02-12 DIAGNOSIS — G47.33 OSA (OBSTRUCTIVE SLEEP APNEA): ICD-10-CM

## 2021-02-12 PROCEDURE — 99214 OFFICE O/P EST MOD 30 MIN: CPT | Mod: 95 | Performed by: INTERNAL MEDICINE

## 2021-02-12 NOTE — TELEPHONE ENCOUNTER
Prescription approved per [facility] Refill Protocol.  Stacie MAYA RN

## 2021-02-15 ENCOUNTER — TELEPHONE (OUTPATIENT)
Dept: SLEEP MEDICINE | Facility: CLINIC | Age: 66
End: 2021-02-15

## 2021-02-15 NOTE — TELEPHONE ENCOUNTER
Attempted to reach Erickson today to schedule titration sleep study, along with 2 week follow up visit with Dr. Rodrigues for test results. No answer. [facility]    Fiona SALGADO CMA, [facility], 2/15/2021 10:34 AM

## 2021-02-16 DIAGNOSIS — E78.5 HYPERLIPIDEMIA LDL GOAL <100: ICD-10-CM

## 2021-02-16 LAB
CHOLEST SERPL-MCNC: 107 MG/DL
HDLC SERPL-MCNC: 22 MG/DL
LDLC SERPL CALC-MCNC: 29 MG/DL
NONHDLC SERPL-MCNC: 85 MG/DL
TRIGL SERPL-MCNC: 279 MG/DL

## 2021-02-16 PROCEDURE — 80061 LIPID PANEL: CPT | Performed by: FAMILY MEDICINE

## 2021-02-16 PROCEDURE — 36415 COLL VENOUS BLD VENIPUNCTURE: CPT | Performed by: FAMILY MEDICINE

## 2021-02-17 DIAGNOSIS — E78.5 HYPERLIPIDEMIA LDL GOAL <100: ICD-10-CM

## 2021-02-17 RX ORDER — ATORVASTATIN CALCIUM 20 MG/1
30 TABLET, FILM COATED ORAL DAILY
Qty: 90 TABLET | Refills: 0 | Status: SHIPPED | OUTPATIENT
Start: 2021-02-17 | End: 2021-05-07

## 2021-02-17 NOTE — RESULT ENCOUNTER NOTE
Dear Erickson,   Here are your recent results. Your triglycerides level increased and your HDL level is very low. I would recommend weight loss, monitor your glucose and increase your physical activity to reduce Triglycerides and increase your HDL. I would suggest increasing your Atorvastatin dose from 20mg once daily to 30mg once daily. Lets re-check your lipids in 6 months.     Best regards,    Vince Henry MD

## 2021-02-17 NOTE — TELEPHONE ENCOUNTER
Erickson is scheduled 3/3/21 () for in lab sleep study w/titration.   Video visit is scheduled 3/30/21 with Dr. Rodrigues for test results.     Sleep study packet emailed.     Fiona SALGADO CMA, [email or web address], 2/17/2021 1:17 PM

## 2021-02-19 ENCOUNTER — MYC MEDICAL ADVICE (OUTPATIENT)
Dept: FAMILY MEDICINE | Facility: CLINIC | Age: 66
End: 2021-02-19

## 2021-02-19 DIAGNOSIS — Z20.822 COVID-19 RULED OUT: Primary | ICD-10-CM

## 2021-02-19 DIAGNOSIS — U07.1 2019 NOVEL CORONAVIRUS DISEASE (COVID-19): ICD-10-CM

## 2021-02-19 LAB
ANNOTATION COMMENT IMP: NORMAL
EJ AB SER QL: NEGATIVE
ENA JO1 AB TITR SER: 0 AU/ML (ref 0–40)
MDA5 (CADM 140) ABY: NEGATIVE
MI2 AB SER QL: NEGATIVE
NXP-2 (NUCLEAR MATRIX PROTEIN 2) ABY: NEGATIVE
OJ AB SER QL: NEGATIVE
P155/140 (TIF1-GAMMA) ANTIBODY: NEGATIVE
PL12 AB SER QL: NEGATIVE
PL7 AB SER QL: NEGATIVE
SAE1 (SUMO ACTIVATING ENZYME) ABY: NEGATIVE
SRP AB SERPL QL: NEGATIVE
TIF-1 GAMMA ANTIBODY: NEGATIVE

## 2021-02-19 NOTE — TELEPHONE ENCOUNTER
FS,    Patient tested positive for Covid on Wednesday.  Wants to know if he should increase his ASA from 81 mg to 325 mg daily due to having covid.    Please advise.  Thanks,  Wandy Pena RN

## 2021-02-19 NOTE — TELEPHONE ENCOUNTER
"Evelyn Almendarez  Hydroxychloroquine (only prescribed in a hospital setting).    Azithromycin-  A few trials about Chloroquine or Hydroxychloroquine With or Without Azithromycin. It is not listed as part of the NIH covid 19 treatment protocol for Covid in an outpatient setting.     Vitamin C - the INH states the following \"There are insufficient data for the COVID-19 Treatment Guidelines Panel  to recommend either for or against the use of vitamin C for the treatment of COVID-19 in non-critically ill patients.\"    Zinc - There are insufficient data to recommend either for or against the use of zinc for the treatment of COVID-19.    Caffeine - unsure if this will be helpful with covid. You can continue with your previous dose.     Aspirin - Per NIH treatment guidelines: there is no need to increase your aspirin dose at this time.     I hope this helps.     I placed a referral to our Get Well Team. They will be calling you for a follow-up visits.       I hope you feel better.     Vince Henry  "

## 2021-02-23 ENCOUNTER — MYC MEDICAL ADVICE (OUTPATIENT)
Dept: FAMILY MEDICINE | Facility: CLINIC | Age: 66
End: 2021-02-23

## 2021-02-23 ENCOUNTER — VIRTUAL VISIT (OUTPATIENT)
Dept: FAMILY MEDICINE | Facility: CLINIC | Age: 66
End: 2021-02-23
Payer: COMMERCIAL

## 2021-02-23 ENCOUNTER — HOSPITAL ENCOUNTER (EMERGENCY)
Facility: CLINIC | Age: 66
Discharge: HOME | End: 2021-02-23
Attending: EMERGENCY MEDICINE | Admitting: EMERGENCY MEDICINE
Payer: COMMERCIAL

## 2021-02-23 ENCOUNTER — APPOINTMENT (OUTPATIENT)
Dept: ULTRASOUND IMAGING | Facility: CLINIC | Age: 66
End: 2021-02-23
Attending: EMERGENCY MEDICINE
Payer: COMMERCIAL

## 2021-02-23 ENCOUNTER — TELEPHONE (OUTPATIENT)
Dept: FAMILY MEDICINE | Facility: CLINIC | Age: 66
End: 2021-02-23

## 2021-02-23 VITALS
WEIGHT: 244 LBS | DIASTOLIC BLOOD PRESSURE: 93 MMHG | BODY MASS INDEX: 38.3 KG/M2 | HEART RATE: 84 BPM | TEMPERATURE: 98 F | SYSTOLIC BLOOD PRESSURE: 131 MMHG | OXYGEN SATURATION: 95 % | HEIGHT: 67 IN | RESPIRATION RATE: 20 BRPM

## 2021-02-23 DIAGNOSIS — M79.89 LEG SWELLING: ICD-10-CM

## 2021-02-23 DIAGNOSIS — I87.2 VENOUS STASIS DERMATITIS OF BOTH LOWER EXTREMITIES: ICD-10-CM

## 2021-02-23 DIAGNOSIS — R22.42 LOCALIZED SWELLING OF LEFT LOWER EXTREMITY: Primary | ICD-10-CM

## 2021-02-23 DIAGNOSIS — I73.9 PERIPHERAL VASCULAR DISEASE (H): ICD-10-CM

## 2021-02-23 DIAGNOSIS — U07.1 2019 NOVEL CORONAVIRUS DISEASE (COVID-19): ICD-10-CM

## 2021-02-23 PROCEDURE — 99214 OFFICE O/P EST MOD 30 MIN: CPT | Mod: 95 | Performed by: FAMILY MEDICINE

## 2021-02-23 PROCEDURE — 99284 EMERGENCY DEPT VISIT MOD MDM: CPT | Mod: 25

## 2021-02-23 PROCEDURE — 93971 EXTREMITY STUDY: CPT | Mod: LT

## 2021-02-23 ASSESSMENT — MIFFLIN-ST. JEOR: SCORE: 1855.41

## 2021-02-23 NOTE — LETTER
February 23, 2021      Parmjit VILLEDA Riccamron  [address]   [facility] [place] [address]        To Whom It May Concern:    Name = Parmjit Hernández  Juror # = [id number]  Mr. Hernández will not be able to participate in Jury duty due to underlying medical conditions.       Please call with questions/concerns : [phone]    Sincerely,   Vince Henry MD

## 2021-02-23 NOTE — TELEPHONE ENCOUNTER
FS,  Please see below [employer] message and advise.  Started letter if you approve  Thanks,  Stacie MAYA RN

## 2021-02-23 NOTE — LETTER
February 24, 2021      Parmjit Hernández  [address]   [facility] [address]        Dear ,    We are writing to inform you of your test results.    {results letter list:076021}    No results found from the In Basket message.    If you have any questions or concerns, please call the clinic at the number listed above.       Sincerely,

## 2021-02-23 NOTE — TELEPHONE ENCOUNTER
Prior Authorization Retail Medication Request    Medication/Dose: atorvastatin (LIPITOR) 20 MG tablet  ICD code (if different than what is on RX):  unknown  Previously Tried and Failed:  unknown  Rationale:  unknown    Insurance Name:  unknown  Insurance ID:  Key: [id number]      Pharmacy Information (if different than what is on RX)  Name:  [facility]  Phone:  unknown

## 2021-02-23 NOTE — PROGRESS NOTES
Erickson is a 65 year old who is being evaluated via a billable video visit.      How would you like to obtain your AVS? MyChart  If the video visit is dropped, the invitation should be resent by: Send to e-mail at: [email or web address]  Will anyone else be joining your video visit? No    Video Start Time: 5:15 PM    Assessment & Plan     Localized swelling of left lower extremity  - US Lower Extremity Venous Duplex Bilateral; Future    Peripheral vascular disease (H)  Venous stasis dermatitis of both lower extremities    LLE swelling I the setting of known PVD, venous stasis and + covid testing recently (otherwise asymptomatic)    Ddx inc DVT  Needs to be ruled out, rec US today  May present to ER or will try to get a outpt test tonight      2019 novel coronavirus disease (COVID-19)  Otherwise asymptomatic, confirmatory test pending    Called ED and discussed with them re covid cohorting for patinet        Review of prior external note(s) from - covid testing, previous CBC and chem labs  Review of the result(s) of each unique test - covid testing  Discussion of management or test interpretation with external physician/other qualified healthcare professional/appropriate source - [facility] ED staff  No LOS data to display             No follow-ups on file.    Alejandro Booker MD  [facility]   Erickson is a 65 year old who presents for the following health issues     HPI       Concern - Edema F/U  Onset: ongoing  Description: left calf edema - darker red, worse than usual  Intensity: mild  Progression of Symptoms:  worsening x2 weeks   Accompanying Signs & Symptoms: pt had COVID+ test 2/17/21, increased pain in left calf    Previous history of similar problem: yes - h/o peripheral neuropathy, venous statis dermatitis (pt does use clobetasol cream), peripheral vascular disease; pt does take hydrochlorothiazide  Precipitating factors:        Worsened by: none  Alleviating factors:         Improved by: compression stockings  Therapies tried and outcome: reducing sodium intake, weight loss, compression stockings/wraps, hctz - somewhat helpful         Review of Systems   Constitutional, HEENT, cardiovascular, pulmonary, GI, , musculoskeletal, neuro, skin, endocrine and psych systems are negative, except as otherwise noted.      Objective    Vitals - Patient Reported  Systolic (Patient Reported): 123  Diastolic (Patient Reported): 79  Pulse (Patient Reported): 66      Vitals:  No vitals were obtained today due to virtual visit.    Physical Exam   GENERAL: Healthy, alert and no distress  EYES: Eyes grossly normal to inspection.  No discharge or erythema, or obvious scleral/conjunctival abnormalities.  RESP: No audible wheeze, cough, or visible cyanosis.  No visible retractions or increased work of breathing.    NEURO: Cranial nerves grossly intact.  Mentation and speech appropriate for age.  PSYCH: Mentation appears normal, affect normal/bright, judgement and insight intact, normal speech and appearance well-groomed.    COVID+ test 2/17/21            Video-Visit Details    Type of service:  Video Visit    Video End Time:5:45 PM    Originating Location (pt. Location): Home    Distant Location (provider location):  [facility]     Platform used for Video Visit: Augure

## 2021-02-23 NOTE — PATIENT INSTRUCTIONS
For scheduling in the South ([facility], [facility]) call [phone]  or   [phone]

## 2021-02-24 NOTE — ED PROVIDER NOTES
"History   Chief Complaint:  Deep Vein Thrombosis     HPI   Parmjit Hernández is a 65 year old male with history of bilateral lower extremity lymphedema from previous XRT, who presents the emergency room at the request of his primary care physician, for leg swelling.  Patient tells me he has symmetric leg swelling, and was told to get an ultrasound of his left leg today to ensure he does not of a blood clot.  He is also Covid positive, but denies any symptoms for the last week, since he was tested.  He tells me he has no new pain, no worsening swelling in his baseline swelling.      Review of Systems   All other systems reviewed and are negative.       Allergies:  Cephalexin  Liraglutide  Sulfa Drugs  Victoza    Medications:  Alpha-lipoic acid  Xanax  Aspirin 81   Atorvastatin   Cariprazine   Depakote   Hydrochlorothiazide   Metformin  Metoprolol succinate   Modafinil   Pregabalin   Senna-docusate  Sitagliptin   Imitrex  Tamsulosin     Past Medical History:    Acquired lymphedema   Amyloidosis   Anxiety    Bipolar I disorder  Depressive disorder   Diverticulitis   Hyperlipidemia   Hypertension   Marianne   Narcotic dependence   NSTEMI  OCD   Cirrhosis of liver   Polyneuropathy   Restless legs syndrome   Stasis dermatitis of both legs   Type 2 diabetes     Peripheral vascular disease  Hypotension  Onychomycosis  NORMA    Past Surgical History:    Abdominal hernia   Back surgery   Biopsy   BMT protocol   Bone marrow biopsy, bone specimen, needle/trocar x2  Cholecystectomy   EGD, combined  Hernia repair, umbilical   Repair deviated septum      Family History:    Cerebrovascular disease   CAD  Hypertension   Alcohol/drug  Arthritis   Allergies   Circulatory   Depression   Asthma     Social History:  The patient arrived to the ED Alone via private car.    Physical Exam     Patient Vitals for the past 24 hrs:   BP Temp Pulse Resp SpO2 Height Weight   02/23/21 1923 (!) 131/93 98  F (36.7  C) 84 20 95 % 1.702 m (5' 7\") 110.7 kg (244 " lb)     Physical Exam  Vitals: reviewed by me  General: Pt seen on hospital [facility], pleasant, cooperative, and alert to conversation  Eyes: Tracking well, clear conjunctiva BL  ENT: MMM, midline trachea.   Lungs:   No tachypnea, no accessory muscle use. No respiratory distress.   CV: Rate as above, regular rhythm.    MSK: Bilateral lower extremity edema noted, 1+, symmetric, and MAITE hose.  Left lower extremity not clearly worse than right, no erythema, no evidence of cellulitis, no trauma, no skin breaks.  Skin: No rash, normal turgor and temperature  Neuro: Clear speech and no facial droop.  Psych: Not RIS, no e/o AH/VH    Emergency Department Course     Imaging:  US Lower Extremity Venous Duplex Left    (Results Pending)      Emergency Department Course:  Reviewed:  2000: I reviewed the patient's nursing notes, vitals, past medical history and care everywhere    Assessments:  2015: I performed an exam of the patient as documented above.     Disposition:  The patient was discharged to home.     Impression & Plan     Medical Decision Making:  Parmjit Hernández is a 65 year old male who presents to the emergency room out of concern for a possible DVT in his left lower extremity. On his exam, he has slight swelling on this side that is worse compared to the contralateral, but does have what appears to be chronic swelling on both sides due to his lymphedema. Thankfully the ultrasound is negative and he has no evidence of a DVT. I do think he is stable for outpatient management, patient feels comfortable with this plan. I do not think he has cellulitis, no evidence of trauma, he is relieved and feels comfortable going home. Red flags for him to come back to the ER were discussed.     Diagnosis:    ICD-10-CM   1. Leg swelling  M79.89     Scribe Disclosure:  I, Beatriz Watters, am serving as a scribe at 8:12 PM on 2/23/2021 to document services personally performed by Spike Mann MD based on my  observations and the provider's statements to me.      Spike Mann MD  02/23/21 [id number]

## 2021-02-24 NOTE — TELEPHONE ENCOUNTER
Central Prior Authorization Team   Phone: [phone]      PA Initiation    Medication: atorvastatin (LIPITOR) 20 MG tablet-Initiated  Insurance Company: [employer] Part D - Phone [phone] Fax [phone]  Pharmacy Filling the Rx: [id number] [place] [address] & [facility]  Filling Pharmacy Phone: [phone]  Filling Pharmacy Fax:    Start Date: 2/24/2021

## 2021-02-24 NOTE — DISCHARGE INSTRUCTIONS
As we discussed, you do have swelling in your legs, but it is not appear to be an infection, and your ultrasound shows no evidence of a DVT today.  Please be absolutely certain to see your regular doctor in the next 3 to 5 days for a follow-up appointment.  Please come back to the emergency room immediately with any worsening pain, any skin changes, meaning any redness or worsening pain.

## 2021-02-24 NOTE — TELEPHONE ENCOUNTER
Prior Authorization Approval    Authorization Effective Date: 2/24/2021  Authorization Expiration Date: 12/31/2021  Medication: atorvastatin (LIPITOR) 20 MG tablet-APPROVED  Approved Dose/Quantity:   Reference #:     Insurance Company: [employer] Part D - Phone [phone] Fax [phone]  Expected CoPay:       CoPay Card Available:      Foundation Assistance Needed:    Which Pharmacy is filling the prescription (Not needed for infusion/clinic administered): [id number] - [place] [address] & [facility]  Pharmacy Notified: Yes  Patient Notified: No    Pharmacy will notify patient when medication is ready.

## 2021-03-01 ENCOUNTER — OFFICE VISIT (OUTPATIENT)
Dept: FAMILY MEDICINE | Facility: CLINIC | Age: 66
End: 2021-03-01
Payer: COMMERCIAL

## 2021-03-01 ENCOUNTER — TRANSFERRED RECORDS (OUTPATIENT)
Dept: HEALTH INFORMATION MANAGEMENT | Facility: CLINIC | Age: 66
End: 2021-03-01

## 2021-03-01 VITALS
HEART RATE: 71 BPM | HEIGHT: 67 IN | RESPIRATION RATE: 18 BRPM | DIASTOLIC BLOOD PRESSURE: 77 MMHG | SYSTOLIC BLOOD PRESSURE: 132 MMHG | WEIGHT: 254 LBS | OXYGEN SATURATION: 96 % | BODY MASS INDEX: 39.87 KG/M2 | TEMPERATURE: 98.1 F

## 2021-03-01 DIAGNOSIS — R60.9 EDEMA, UNSPECIFIED TYPE: ICD-10-CM

## 2021-03-01 DIAGNOSIS — I87.2 VENOUS STASIS DERMATITIS OF BOTH LOWER EXTREMITIES: Primary | ICD-10-CM

## 2021-03-01 DIAGNOSIS — Z20.822 EXPOSURE TO COVID-19 VIRUS: ICD-10-CM

## 2021-03-01 PROCEDURE — 99214 OFFICE O/P EST MOD 30 MIN: CPT | Performed by: FAMILY MEDICINE

## 2021-03-01 RX ORDER — FUROSEMIDE 20 MG
20 TABLET ORAL DAILY
Qty: 5 TABLET | Refills: 0 | Status: SHIPPED | OUTPATIENT
Start: 2021-03-01 | End: 2021-03-05

## 2021-03-01 ASSESSMENT — MIFFLIN-ST. JEOR: SCORE: 1900.77

## 2021-03-01 NOTE — PROGRESS NOTES
"Assessment & Plan         Edema, unspecified type  He was noted to have bilateral lower extremity edema.  Recent ultrasound was reviewed and it did not reveal any acute DVTs.  Patient was advised to continue with compressions stockings.  He was given a prescription of low-dose Lasix.  Would recommend returning to clinic in 2 days for repeat potassium check.   - furosemide (LASIX) 20 MG tablet; Take 1 tablet (20 mg) by mouth daily for 5 days  - **Potassium FUTURE anytime; Future    Venous stasis dermatitis of both lower extremities  Same as above.    Exposure to COVID-19 virus  Patient was recently diagnosed with Covid.  The patient believes that the test was false positive.  He was asymptomatic prior to his this as well as during the 10 days of isolation.  - COVID-19 Virus (Coronavirus) Antibody & Titer Reflex; Future                 Return in about 1 month (around 4/1/2021) for Routine Visit with PCP - If needed.    Vince Henry MD  [facility]    Lavinia Almendarez is a 65 year old who presents for the following health issues     HPI     Concern - Edema   Onset: a couple of weeks   Description: left leg   Intensity: moderate  Progression of Symptoms:  worsening  Accompanying Signs & Symptoms: swelling and red   Previous history of similar problem: yes   Precipitating factors:        Worsened by:   Alleviating factors:        Improved by:   Therapies tried and outcome: would like medication to help     The patient gets tested every month. He states that it is recommended by \"the health department\" to ensure that he is not an asymptomatic carrier.  He presented for a routine screening test for Covid on 02/18/21. Initially, he was asymptomatic and remained asymptomatic during the whole 10 days of isolation..   Exposure maybe 14 days, he believes that he might have been exposed at [facility].    Blood pressure has been excellent since he started a diet. Patient lost more than 14 lbs in the " "last 2 months.     For lumbar pain, he is scheduled for lumbar radio-frequency ablation on - 03/15/21.    Also, he noticed swelling of the left lower extremity one week ago. He was seen at the ER and was informed that it is negative for a DVT. He states that swelling and erythema are improving. Mild swelling is still present on both legs. Left worse than the right.     Review of Systems   Constitutional, HEENT, cardiovascular, pulmonary, gi and gu systems are negative, except as otherwise noted.      Objective    /77   Pulse 71   Temp 98.1  F (36.7  C) (Oral)   Resp 18   Ht 1.702 m (5' 7\")   Wt 115.2 kg (254 lb)   SpO2 96%   BMI 39.78 kg/m    Body mass index is 39.78 kg/m .  Physical Exam   GENERAL: healthy, alert and no distress  RESP: lungs clear to auscultation - no rales, rhonchi or wheezes  CV: regular rate and rhythm, normal S1 S2, no S3 or S4, no murmur, click or rub, no peripheral edema and peripheral pulses strong  MS: pitting edema of bilateral lower extremities.  Measurement of the left lower extremity is 18.5.  Measurement of the right lower extremity is 17.5.        "

## 2021-03-01 NOTE — PATIENT INSTRUCTIONS
After Visit Summary/Follow-up Plan  1) Start taking Lasix 20mg for the next 5 days THEN stop.  2) Return to clinic on 03/03/2021 for potassium check and Covid Antibody test    Thank you for allowing us the privilege of caring for you. We hope we provided you with the excellent service you deserve. I value your experience and would be very thankful for your time with providing feedback on our clinic survey. You may receive a survey via email or text message in the next few days.    Vince Henry MD

## 2021-03-02 ASSESSMENT — ANXIETY QUESTIONNAIRES
GAD7 TOTAL SCORE: 7
3. WORRYING TOO MUCH ABOUT DIFFERENT THINGS: MORE THAN HALF THE DAYS
IF YOU CHECKED OFF ANY PROBLEMS ON THIS QUESTIONNAIRE, HOW DIFFICULT HAVE THESE PROBLEMS MADE IT FOR YOU TO DO YOUR WORK, TAKE CARE OF THINGS AT HOME, OR GET ALONG WITH OTHER PEOPLE: SOMEWHAT DIFFICULT
5. BEING SO RESTLESS THAT IT IS HARD TO SIT STILL: NOT AT ALL
1. FEELING NERVOUS, ANXIOUS, OR ON EDGE: MORE THAN HALF THE DAYS
7. FEELING AFRAID AS IF SOMETHING AWFUL MIGHT HAPPEN: NOT AT ALL
6. BECOMING EASILY ANNOYED OR IRRITABLE: NOT AT ALL
2. NOT BEING ABLE TO STOP OR CONTROL WORRYING: MORE THAN HALF THE DAYS

## 2021-03-02 ASSESSMENT — PATIENT HEALTH QUESTIONNAIRE - PHQ9
5. POOR APPETITE OR OVEREATING: SEVERAL DAYS
SUM OF ALL RESPONSES TO PHQ QUESTIONS 1-9: 8

## 2021-03-03 ENCOUNTER — MYC MEDICAL ADVICE (OUTPATIENT)
Dept: FAMILY MEDICINE | Facility: CLINIC | Age: 66
End: 2021-03-03

## 2021-03-03 DIAGNOSIS — Z20.822 EXPOSURE TO COVID-19 VIRUS: ICD-10-CM

## 2021-03-03 DIAGNOSIS — R60.9 EDEMA, UNSPECIFIED TYPE: ICD-10-CM

## 2021-03-03 PROCEDURE — 84132 ASSAY OF SERUM POTASSIUM: CPT | Performed by: FAMILY MEDICINE

## 2021-03-03 PROCEDURE — 36415 COLL VENOUS BLD VENIPUNCTURE: CPT | Performed by: FAMILY MEDICINE

## 2021-03-03 PROCEDURE — 86769 SARS-COV-2 COVID-19 ANTIBODY: CPT | Performed by: FAMILY MEDICINE

## 2021-03-03 ASSESSMENT — ANXIETY QUESTIONNAIRES: GAD7 TOTAL SCORE: 7

## 2021-03-04 LAB
POTASSIUM SERPL-SCNC: 3.9 MMOL/L (ref 3.4–5.3)
SARS-COV-2 AB PNL SERPL IA: NEGATIVE
SARS-COV-2 IGG SERPL IA-ACNC: NORMAL

## 2021-03-04 NOTE — TELEPHONE ENCOUNTER
If the current dose of Lasix is not working well,  I would recommend increasing your dose slightly to see if that helps with the swelling.  Schedule a follow-up visit at the end of this week and we can order additional tests prior to proceeding with a vascular surgery consult.    Elaine

## 2021-03-05 ENCOUNTER — TELEPHONE (OUTPATIENT)
Dept: OTHER | Facility: CLINIC | Age: 66
End: 2021-03-05

## 2021-03-05 ENCOUNTER — OFFICE VISIT (OUTPATIENT)
Dept: FAMILY MEDICINE | Facility: CLINIC | Age: 66
End: 2021-03-05
Payer: COMMERCIAL

## 2021-03-05 VITALS
TEMPERATURE: 97.4 F | OXYGEN SATURATION: 95 % | SYSTOLIC BLOOD PRESSURE: 94 MMHG | DIASTOLIC BLOOD PRESSURE: 72 MMHG | WEIGHT: 252 LBS | BODY MASS INDEX: 39.55 KG/M2 | HEART RATE: 83 BPM | HEIGHT: 67 IN | RESPIRATION RATE: 17 BRPM

## 2021-03-05 DIAGNOSIS — I87.2 VENOUS STASIS DERMATITIS OF BOTH LOWER EXTREMITIES: ICD-10-CM

## 2021-03-05 DIAGNOSIS — E85.89 OTHER AMYLOIDOSIS (H): ICD-10-CM

## 2021-03-05 DIAGNOSIS — I73.9 PERIPHERAL VASCULAR DISEASE (H): ICD-10-CM

## 2021-03-05 DIAGNOSIS — R60.9 EDEMA, UNSPECIFIED TYPE: Primary | ICD-10-CM

## 2021-03-05 DIAGNOSIS — I10 HYPERTENSION GOAL BP (BLOOD PRESSURE) < 140/90: ICD-10-CM

## 2021-03-05 LAB
BASOPHILS # BLD AUTO: 0.1 10E9/L (ref 0–0.2)
BASOPHILS NFR BLD AUTO: 1.3 %
DIFFERENTIAL METHOD BLD: ABNORMAL
EOSINOPHIL # BLD AUTO: 0.4 10E9/L (ref 0–0.7)
EOSINOPHIL NFR BLD AUTO: 6.7 %
ERYTHROCYTE [DISTWIDTH] IN BLOOD BY AUTOMATED COUNT: 14.9 % (ref 10–15)
HCT VFR BLD AUTO: 37.7 % (ref 40–53)
HGB BLD-MCNC: 12.5 G/DL (ref 13.3–17.7)
LYMPHOCYTES # BLD AUTO: 1.2 10E9/L (ref 0.8–5.3)
LYMPHOCYTES NFR BLD AUTO: 21.5 %
MCH RBC QN AUTO: 32.6 PG (ref 26.5–33)
MCHC RBC AUTO-ENTMCNC: 33.2 G/DL (ref 31.5–36.5)
MCV RBC AUTO: 98 FL (ref 78–100)
MONOCYTES # BLD AUTO: 0.4 10E9/L (ref 0–1.3)
MONOCYTES NFR BLD AUTO: 7.2 %
NEUTROPHILS # BLD AUTO: 3.4 10E9/L (ref 1.6–8.3)
NEUTROPHILS NFR BLD AUTO: 63.3 %
PLATELET # BLD AUTO: 164 10E9/L (ref 150–450)
RBC # BLD AUTO: 3.84 10E12/L (ref 4.4–5.9)
WBC # BLD AUTO: 5.4 10E9/L (ref 4–11)

## 2021-03-05 PROCEDURE — 83880 ASSAY OF NATRIURETIC PEPTIDE: CPT | Performed by: PHYSICIAN ASSISTANT

## 2021-03-05 PROCEDURE — 99214 OFFICE O/P EST MOD 30 MIN: CPT | Performed by: PHYSICIAN ASSISTANT

## 2021-03-05 PROCEDURE — 80053 COMPREHEN METABOLIC PANEL: CPT | Performed by: PHYSICIAN ASSISTANT

## 2021-03-05 PROCEDURE — 85025 COMPLETE CBC W/AUTO DIFF WBC: CPT | Performed by: PHYSICIAN ASSISTANT

## 2021-03-05 PROCEDURE — 36415 COLL VENOUS BLD VENIPUNCTURE: CPT | Performed by: PHYSICIAN ASSISTANT

## 2021-03-05 RX ORDER — METOPROLOL SUCCINATE 25 MG/1
50 TABLET, EXTENDED RELEASE ORAL DAILY
Qty: 60 TABLET | Refills: 1 | Status: SHIPPED | OUTPATIENT
Start: 2021-03-05 | End: 2021-06-28

## 2021-03-05 RX ORDER — HYDROCHLOROTHIAZIDE 25 MG/1
37.5 TABLET ORAL DAILY
Qty: 45 TABLET | Refills: 1 | Status: SHIPPED | OUTPATIENT
Start: 2021-03-05 | End: 2021-06-18

## 2021-03-05 RX ORDER — FUROSEMIDE 20 MG
20 TABLET ORAL DAILY
Qty: 5 TABLET | Refills: 0 | Status: CANCELLED | OUTPATIENT
Start: 2021-03-05

## 2021-03-05 RX ORDER — ACETAMINOPHEN 500 MG
1000 TABLET ORAL EVERY 8 HOURS PRN
COMMUNITY
End: 2024-08-19

## 2021-03-05 ASSESSMENT — MIFFLIN-ST. JEOR: SCORE: 1891.69

## 2021-03-05 NOTE — TELEPHONE ENCOUNTER
Pt referred to [facility] by Tish Russell PA-C for Venous stasis dermatitis of both lower extremities and edema.     This is Dr. Ferrer patient- LOV 2/28/19.    Pt needs to be scheduled for return visit with Dr. Ferrer at next available.    Kathryn SÁNCHEZN, RN    [facility]  Office: [phone]  Fax: [phone]

## 2021-03-05 NOTE — PATIENT INSTRUCTIONS
Trial of hydrochlorothiazide 37.5 mg with decrease in metoprolol XL to 25 mg daily, pending BP readings.  Labs updated today.  Referral for vascular medicine as next step given as well.  Repeat labs in 1-2 weeks with lab only appointment and follow up with PCP as needed or with any worsening in symptoms.     We are working hard to begin vaccinating more people against COVID-19. Currently, we are only vaccinating Phase 1a workers - healthcare workers who are unable to do their job remotely. Vaccine availability is very limited.      If you are a healthcare worker and you are unable to do your job remotely, please log in to HelpMeNow using this link to see if we have openings and schedule an appointment. At your vaccine appointment, you will be asked to provide proof of employment as a health care worker. If you cannot, you will be turned away.     Vaccine appointments are being added as they become available. Please check your HelpMeNow account frequently for availability.  If you have technical difficulty using HelpMeNow, call [phone] for assistance.     You can learn more about the state's phased approach to administering the vaccine, with details on each phase, here.      Phase 1b is the next group that will get vaccinated and includes frontline essential workers and adults 75 years of age and older. When we are able to start vaccinating this group, we will share that information on our website. Check this website to stay up to date on COVID-19 vaccination information.        Did you know?      You can schedule a video visit for follow-up appointments as well as future appointments for certain conditions.  Please see the below link.     [email or web address]    If you have not already done so,  I encourage you to sign up for [employer] ([email or web address]).  This will allow you to review your results, securely communicate with a provider, and schedule virtual visits as  well.

## 2021-03-05 NOTE — PROGRESS NOTES
Assessment & Plan     Edema, unspecified type  Previous sonogram negative for DV; continue compression stockings and update labs today; patient does not want to see lymphedema clinic at this time, as has seen them in the past; open to seeing vascular medicine (not surgery though) - referral placed today. Cautiously advised increase in hydrochlorothiazide to 37.5 mg as has worked well before, but may need to decrease metoprolol XL to 25 mg as well as BP readings very low today. Patient currently asymptomatic for hypotension and checks his BP at home regularly. Will monitor closely with medicine changes and update as soon as possible.  - Comprehensive metabolic panel  - CBC with platelets differential  - BNP-N terminal pro  - VASCULAR MEDICINE REFERRAL; Future    Venous stasis dermatitis of both lower extremities  See above and vascular medicine referral placed today.  - Comprehensive metabolic panel  - CBC with platelets differential  - BNP-N terminal pro  - VASCULAR MEDICINE REFERRAL; Future    Peripheral vascular disease (H)  See above and vascular medicine referral placed today.  - BNP-N terminal pro  - VASCULAR MEDICINE REFERRAL; Future    Hypertension goal BP (blood pressure) < 140/90  Long standing, chronic, controlled but LOW today - within normal limits this morning at home though. Will monitor closely especially with medicine changes as above.  - BNP-N terminal pro  - hydrochlorothiazide (HYDRODIURIL) 25 MG tablet; Take 1.5 tablets (37.5 mg) by mouth daily  - metoprolol succinate ER (TOPROL-XL) 25 MG 24 hr tablet; Take 2 tablets (50 mg) by mouth daily  - Basic metabolic panel; Future    Other amyloidosis (H)  - BNP-N terminal pro    Review of prior external note(s) from - recent PCP visit and past ED visits  Review of the result(s) of each unique test - labs done at previous visit and in ED 12/20  30+ minutes spent on the date of the encounter doing chart review, history and exam, documentation and  further activities as noted above       Patient Instructions   Trial of hydrochlorothiazide 37.5 mg with decrease in metoprolol XL to 25 mg daily, pending BP readings.  Labs updated today.  Referral for vascular medicine as next step given as well.  Repeat labs in 1-2 weeks with lab only appointment and follow up with PCP as needed or with any worsening in symptoms.     We are working hard to begin vaccinating more people against COVID-19. Currently, we are only vaccinating Phase 1a workers - healthcare workers who are unable to do their job remotely. Vaccine availability is very limited.      If you are a healthcare worker and you are unable to do your job remotely, please log in to [employer] using this link to see if we have openings and schedule an appointment. At your vaccine appointment, you will be asked to provide proof of employment as a health care worker. If you cannot, you will be turned away.     Vaccine appointments are being added as they become available. Please check your FilmDoo account frequently for availability.  If you have technical difficulty using [employer], call [phone] for assistance.     You can learn more about the state's phased approach to administering the vaccine, with details on each phase, here.      Phase 1b is the next group that will get vaccinated and includes frontline essential workers and adults 75 years of age and older. When we are able to start vaccinating this group, we will share that information on our website. Check this website to stay up to date on COVID-19 vaccination information.        Did you know?      You can schedule a video visit for follow-up appointments as well as future appointments for certain conditions.  Please see the below link.     [email or web address]    If you have not already done so,  I encourage you to sign up for [employer] ([email or web address]/"[email or web address]).  This will allow you to review your results,  "securely communicate with a provider, and schedule virtual visits as well.      Return in about 2 weeks (around 3/19/2021) for lab only appointment, or sooner with worsening symptoms.    Tish Russell PA-C  [facility]      Lavinia Almendarez is a 65 year old who presents for the following health issues     HPI       Concern - Edema   Onset: a couple of weeks   Description: left leg   Intensity: moderate  Progression of Symptoms:  worsening  Accompanying Signs & Symptoms: swelling and red   Previous history of similar problem: yes   Precipitating factors:        Worsened by:   Alleviating factors:        Improved by:   Therapies tried and outcome: would like medication to help     Blood pressure has been excellent since he started a diet. Patient lost more than 14 lbs in the last 2 months.     He noticed worsening of his baseline swelling of the left lower extremity one week ago. He was seen at the ER and was informed that it is negative for a DVT. He states that swelling and erythema are improving but still present. Left worse than the right.   Patient has been taking Lasix pre previus visit for 5 days with NO changes. Has had better success with higher dosage of hydrochlorothiazide in the past for increased edema/swelling and wondering about this option.  Potassium checked 3/3/21 - within normal limits.  BP much lower today though in clinic, checks at home and not usually this low, but monitors closely and overall feels ok.  No worsening shortness of breath, lightheadedness, dizziness, headaches, chest pain.    Review of Systems   Constitutional, HEENT, cardiovascular, pulmonary, GI, , musculoskeletal, neuro, skin, endocrine and psych systems are negative, except as otherwise noted.      Objective    BP 94/72   Pulse 83   Temp 97.4  F (36.3  C) (Tympanic)   Resp 17   Ht 1.702 m (5' 7\")   Wt 114.3 kg (252 lb)   SpO2 95%   BMI 39.47 kg/m    Body mass index is 39.47 " kg/m .  Physical Exam   GENERAL: healthy, alert and no distress  RESP: lungs clear to auscultation - no rales, rhonchi or wheezes  CV: regular rate and rhythm, normal S1 S2, no S3 or S4, no murmur, click or rub, no peripheral edema and peripheral pulses strong  MS: no gross musculoskeletal defects noted, 1+ bilateral pitting edema (left > right)  PSYCH: mentation appears normal, affect normal/bright    Results for orders placed or performed in visit on 03/05/21   CBC with platelets differential     Status: Abnormal   Result Value Ref Range    WBC 5.4 4.0 - 11.0 10e9/L    RBC Count 3.84 (L) 4.4 - 5.9 10e12/L    Hemoglobin 12.5 (L) 13.3 - 17.7 g/dL    Hematocrit 37.7 (L) 40.0 - 53.0 %    MCV 98 78 - 100 fl    MCH 32.6 26.5 - 33.0 pg    MCHC 33.2 31.5 - 36.5 g/dL    RDW 14.9 10.0 - 15.0 %    Platelet Count 164 150 - 450 10e9/L    % Neutrophils 63.3 %    % Lymphocytes 21.5 %    % Monocytes 7.2 %    % Eosinophils 6.7 %    % Basophils 1.3 %    Absolute Neutrophil 3.4 1.6 - 8.3 10e9/L    Absolute Lymphocytes 1.2 0.8 - 5.3 10e9/L    Absolute Monocytes 0.4 0.0 - 1.3 10e9/L    Absolute Eosinophils 0.4 0.0 - 0.7 10e9/L    Absolute Basophils 0.1 0.0 - 0.2 10e9/L    Diff Method Automated Method

## 2021-03-05 NOTE — TELEPHONE ENCOUNTER
LM to sched with Dr. Ferrer return visit at next available.     Apt Note:   Pt referred to [facility] by Tish Russell PA-C for Venous stasis dermatitis of both lower extremities and edema.    This is Dr. Ferrer patient- LOV 2/28/19.      Olga VICTORIA

## 2021-03-08 LAB
ALBUMIN SERPL-MCNC: 3.7 G/DL (ref 3.4–5)
ALP SERPL-CCNC: 107 U/L (ref 40–150)
ALT SERPL W P-5'-P-CCNC: 36 U/L (ref 0–70)
ANION GAP SERPL CALCULATED.3IONS-SCNC: 7 MMOL/L (ref 3–14)
AST SERPL W P-5'-P-CCNC: 24 U/L (ref 0–45)
BILIRUB SERPL-MCNC: 0.4 MG/DL (ref 0.2–1.3)
BUN SERPL-MCNC: 28 MG/DL (ref 7–30)
CALCIUM SERPL-MCNC: 9.2 MG/DL (ref 8.5–10.1)
CHLORIDE SERPL-SCNC: 107 MMOL/L (ref 94–109)
CO2 SERPL-SCNC: 27 MMOL/L (ref 20–32)
CREAT SERPL-MCNC: 1.24 MG/DL (ref 0.66–1.25)
GFR SERPL CREATININE-BSD FRML MDRD: 61 ML/MIN/{1.73_M2}
GLUCOSE SERPL-MCNC: 134 MG/DL (ref 70–99)
NT-PROBNP SERPL-MCNC: 127 PG/ML (ref 0–125)
POTASSIUM SERPL-SCNC: 3.5 MMOL/L (ref 3.4–5.3)
PROT SERPL-MCNC: 6.7 G/DL (ref 6.8–8.8)
SODIUM SERPL-SCNC: 141 MMOL/L (ref 133–144)

## 2021-03-08 NOTE — TELEPHONE ENCOUNTER
2nd and final attempt to schedule with Dr. Ferrer at next available.     Apt Note:  Pt referred to [facility] by Tish Russell PA-C for Venous stasis dermatitis of both lower extremities and edema.    This is Dr. Ferrer patient- LOV 2/28/19.    Olga VICTORIA   Routing to nurse CO

## 2021-03-08 NOTE — RESULT ENCOUNTER NOTE
"Renate Parnell  Your attached labs are overall within normal limits and stable.  The BNP (heart marker) is actually much improved, which is reassuring.    Please contact the office with any questions or concerns.    Tish Salas \"Kameron\" ITZ Russell    "

## 2021-03-08 NOTE — TELEPHONE ENCOUNTER
I was called earlier today by Dr. Loomis from ER re: new stroke symptoms with a LWKT 2AM last night.  She had strokes recently, both in April and about a week ago and was seen in [facility] system.   As a result of the very recent strokes/unclear time of onset she was not a tpa candidate.  I asked Dr. Loomis to clarify with family whether they would want IA treatment, but given her recent decline, they were not inclined to do so.   She presented today with new left sided weakness and language disturbance.  She had not been on anticoagulation due to age, bleeding/fall concerns.  Palliative saw her during last hospital stay at [facility] and was not made hospice, but goals of care were discussed at that point    I was reviewing chart in preparation for  and noted that she has been admitted under comfort care treatment plan.  I spoke with Dr. Jones to find out if a neurology consult was needed, and he does not feel that this would be beneficial.  I asked him to contact me or on call neurology for any neurological concerns arising during this stay.       Scheduled for 3/23/2021 with Dr. Ferrer at 2:30pm.     Olga VICTORIA

## 2021-03-12 ENCOUNTER — MYC MEDICAL ADVICE (OUTPATIENT)
Dept: FAMILY MEDICINE | Facility: CLINIC | Age: 66
End: 2021-03-12

## 2021-03-15 NOTE — TELEPHONE ENCOUNTER
Dear Erickson,   If you received a scheduling ticket for the Covid vaccine I would recommend scheduling the appointment. Our system prioritized tier 3 patients with high risk medical conditions to tier 2.    Elaine

## 2021-03-19 ENCOUNTER — IMMUNIZATION (OUTPATIENT)
Dept: NURSING | Facility: CLINIC | Age: 66
End: 2021-03-19
Payer: COMMERCIAL

## 2021-03-19 PROCEDURE — 91301 PR COVID VAC MODERNA 100 MCG/0.5 ML IM: CPT

## 2021-03-19 PROCEDURE — 0011A PR COVID VAC MODERNA 100 MCG/0.5 ML IM: CPT

## 2021-03-26 ENCOUNTER — MYC MEDICAL ADVICE (OUTPATIENT)
Dept: FAMILY MEDICINE | Facility: CLINIC | Age: 66
End: 2021-03-26

## 2021-03-26 DIAGNOSIS — E11.69 TYPE 2 DIABETES MELLITUS WITH OTHER SPECIFIED COMPLICATION, WITHOUT LONG-TERM CURRENT USE OF INSULIN (H): ICD-10-CM

## 2021-03-26 RX ORDER — METFORMIN HCL 500 MG
1000 TABLET, EXTENDED RELEASE 24 HR ORAL 2 TIMES DAILY WITH MEALS
Qty: 360 TABLET | Refills: 1 | Status: SHIPPED | OUTPATIENT
Start: 2021-03-26 | End: 2021-04-23

## 2021-03-26 NOTE — TELEPHONE ENCOUNTER
Dear Erickson  We can increase your metformin dose to 1000 mg TWICE DAILY with your largest meals. I'm excited about your weight loss, your are doing amazing. Continue with your diet and continue to monitor your blood glucose.    We are excited for you.     Best Regards  Vince Henry MD.

## 2021-03-31 NOTE — TELEPHONE ENCOUNTER
Dear Erickson  For now, I would recommend continuing with both medications. Let us know if your blood glucose falls bellow 70.     Elaine

## 2021-04-01 ENCOUNTER — TRANSFERRED RECORDS (OUTPATIENT)
Dept: HEALTH INFORMATION MANAGEMENT | Facility: CLINIC | Age: 66
End: 2021-04-01

## 2021-04-06 ENCOUNTER — MYC MEDICAL ADVICE (OUTPATIENT)
Dept: FAMILY MEDICINE | Facility: CLINIC | Age: 66
End: 2021-04-06

## 2021-04-09 ENCOUNTER — TELEPHONE (OUTPATIENT)
Dept: FAMILY MEDICINE | Facility: CLINIC | Age: 66
End: 2021-04-09

## 2021-04-09 NOTE — TELEPHONE ENCOUNTER
Prior Authorization Retail Medication Request    Medication/Dose: pregabalin (LYRICA) 100 MG capsule  ICD code (if different than what is on RX):    Previously Tried and Failed:  gabapentin  Rationale:  Pt has been taking for 3+ years and it's working     Insurance Name:  Kaitlin [phone]  Insurance ID:  [id number]      Pharmacy Information (if different than what is on RX)  Name:  [facility]   Phone:  [phone]

## 2021-04-09 NOTE — TELEPHONE ENCOUNTER
FS,    Patient called.  Had first Covid vaccine (moderna) 3 weeks ago.  States he has been having mild Covid symptoms past few days.    Went to have rapid Covid antibody test at  [facility] today.  Antibody test + - asking if this is from the vaccine.    Thanks,    Wandy Pena RN

## 2021-04-12 NOTE — TELEPHONE ENCOUNTER
Central Prior Authorization Team   Phone: [phone]      PA Initiation    Medication: pregabalin (LYRICA) 100 MG capsule-Initiated  Insurance Company: [employer] ([facility]) - Phone [phone] Fax [phone]  Pharmacy Filling the Rx: [id number] [place] [id number] & [facility]  Filling Pharmacy Phone: [phone]  Filling Pharmacy Fax:    Start Date: 4/12/2021

## 2021-04-12 NOTE — TELEPHONE ENCOUNTER
Prior Authorization Approval    Authorization Effective Date: 4/12/2021  Authorization Expiration Date: 12/31/2021  Medication: pregabalin (LYRICA) 100 MG capsule-APPROVED  Approved Dose/Quantity:   Reference #:     Insurance Company: [employer] ([facility]) - Phone [phone] Fax [phone]  Expected CoPay:       CoPay Card Available:      Foundation Assistance Needed:    Which Pharmacy is filling the prescription (Not needed for infusion/clinic administered): [id number] [place] [address] & [facility]  Pharmacy Notified: Yes  Patient Notified: No    Pharmacy has been notified.

## 2021-04-15 DIAGNOSIS — E11.69 TYPE 2 DIABETES MELLITUS WITH OTHER SPECIFIED COMPLICATION, WITHOUT LONG-TERM CURRENT USE OF INSULIN (H): ICD-10-CM

## 2021-04-16 ENCOUNTER — IMMUNIZATION (OUTPATIENT)
Dept: NURSING | Facility: CLINIC | Age: 66
End: 2021-04-16
Attending: INTERNAL MEDICINE
Payer: COMMERCIAL

## 2021-04-16 PROCEDURE — 91301 PR COVID VAC MODERNA 100 MCG/0.5 ML IM: CPT

## 2021-04-16 PROCEDURE — 0012A PR COVID VAC MODERNA 100 MCG/0.5 ML IM: CPT

## 2021-04-23 ENCOUNTER — MYC MEDICAL ADVICE (OUTPATIENT)
Dept: FAMILY MEDICINE | Facility: CLINIC | Age: 66
End: 2021-04-23

## 2021-04-29 ENCOUNTER — OFFICE VISIT (OUTPATIENT)
Dept: OTHER | Facility: CLINIC | Age: 66
End: 2021-04-29
Attending: PHYSICIAN ASSISTANT
Payer: COMMERCIAL

## 2021-04-29 VITALS
RESPIRATION RATE: 16 BRPM | WEIGHT: 245 LBS | BODY MASS INDEX: 38.45 KG/M2 | OXYGEN SATURATION: 96 % | SYSTOLIC BLOOD PRESSURE: 138 MMHG | DIASTOLIC BLOOD PRESSURE: 93 MMHG | HEIGHT: 67 IN | HEART RATE: 73 BPM

## 2021-04-29 DIAGNOSIS — I89.0 LYMPHEDEMA OF BOTH LOWER EXTREMITIES: Primary | ICD-10-CM

## 2021-04-29 DIAGNOSIS — I87.2 VENOUS STASIS DERMATITIS OF BOTH LOWER EXTREMITIES: ICD-10-CM

## 2021-04-29 DIAGNOSIS — E11.59 TYPE 2 DIABETES MELLITUS WITH OTHER CIRCULATORY COMPLICATION, WITHOUT LONG-TERM CURRENT USE OF INSULIN (H): ICD-10-CM

## 2021-04-29 DIAGNOSIS — G47.33 OSA (OBSTRUCTIVE SLEEP APNEA): ICD-10-CM

## 2021-04-29 DIAGNOSIS — I73.9 PERIPHERAL VASCULAR DISEASE (H): ICD-10-CM

## 2021-04-29 DIAGNOSIS — I10 BENIGN ESSENTIAL HYPERTENSION: ICD-10-CM

## 2021-04-29 DIAGNOSIS — E85.89 OTHER AMYLOIDOSIS (H): ICD-10-CM

## 2021-04-29 PROCEDURE — 99214 OFFICE O/P EST MOD 30 MIN: CPT | Performed by: INTERNAL MEDICINE

## 2021-04-29 PROCEDURE — G0463 HOSPITAL OUTPT CLINIC VISIT: HCPCS

## 2021-04-29 ASSESSMENT — MIFFLIN-ST. JEOR: SCORE: 1859.94

## 2021-04-29 NOTE — PROGRESS NOTES
[facility] FOLLOW UP  VASCULAR MEDICINE VISIT      PRIMARY HEALTH CARE PROVIDER:  Vince Henry MD      REASON FOR VISIT:   Follow up   He was last seen more than 2 years ago in the clinic  Follow up visit  For skin issues of legs , seen derm using clobetosol  underwent venous comp studies at  2 years ago and recent venous duplex negative for DVT  Complex and complicated medical HX   Previous  RF, CCP, HARRY, centromere , scleroderma panel negative  Hx amyloidosis underwent stem cell transplant few years ago at [place]  Excellent BP with adding diuretic    HPI:    This is a 65-year-old very pleasant male with history of multiple medical problems initially seen more than 2 years ago for evaluation and management of vascular causes of Rt leg ulcer developed 2 weeks prior to last  Visit on lateral aspect of mid portion of leg. No pain, no fever, no injury, no claudication. Known DM well controlled ( A1c 5.3 in dec 2018). 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 Perez. . Non smoker, no Hx of DVT 1/2019 BLE venous duplex negative for DVT. Father HX of scleroderma . He underwent rheum panel  all of them are normal. He denies any arthralgias,swallowing problems. Previous EGD ,  Colonoscopy ( B9 polyps removed) capsule endoscopy negative.   Polyneuropathy and chronic back pain     Reviewed previous venous comp results  He was seen and evaluated by yesterday dermatologist initiated steroid cream for his venous stasis dermatitis of both lower extremities.  He has a leg swelling with venous insufficiency and no history of a DVT recent venous duplex negative.  Using compression stockings  Few years ago he underwent screening ESME at home which was normal  Known history of NORMA using CPAP machine      PAST MEDICAL HISTORY  Past Medical History:   Diagnosis Date     Acquired lymphedema 2/4/2019      Allergic state      Amyloidosis (H)     followed by hematology Dr. Romeo status post peripheral stem cell transplant     Anxiety 5/11/2016     Anxiety and depression 12/18/2013     Bipolar I disorder (H) 9/1/2015    Under care of [facility]- Dr Rahman doxepin 25 mg, 2 cap bedtime DULoxetine 20 mg once daily  OLANZapine 7.5 mg  1 tab bedtime      Depressive disorder 1995     EKG abnormality 4/20/2020     H/O bone marrow transplant (H)      Headache(784.0)      History of diverticulitis 1/27/2015    1/15-rxed with antibiotics      Hyperlipidemia LDL goal <100 10/31/2010     Hypertension 2007     Hypertension goal BP (blood pressure) < 140/90 10/11/2011     Marianne (H) 8/20/2015     Morbid obesity (H) 8/28/2018     Myalgia and myositis, unspecified      Narcotic dependence (H) 9/6/2016    None in about 6 months- 8/1/17     NSTEMI (non-ST elevated myocardial infarction) (H) 04/19/2020     OCD (obsessive compulsive disorder)      Other acquired absence of organ 94     Other cirrhosis of liver (H) possibly from vences  4/15/2020     Other specified viral warts      Peripheral edema 5/20/2018    Noncardiac Continue with  furosemide (LASIX) 20 MG tablet,  potassium       chloride SA (K-DUR/KLOR-CON M) 10 MEQ CR  Tab once daily  Basic metabolic panel     Polyneuropathy associated with underlying disease (H) 2/4/2019     Restless legs syndrome (RLS) 6/29/2017     Stasis dermatitis of both legs 12/31/2018     Type 2 diabetes mellitus with other specified complication (H) 7/10/2017       CURRENT MEDICATIONS  acetaminophen (TYLENOL) 500 MG tablet, Take 1,000 mg by mouth every 8 hours as needed for mild pain  alpha-lipoic acid 100 MG capsule, Take 100 mg by mouth daily   ALPRAZolam (XANAX) 0.5 MG tablet, Take 0.5 mg by mouth At Bedtime  aspirin (ASPIRIN LOW DOSE) 81 MG tablet, Take 1 tablet (81 mg) by mouth daily  atorvastatin (LIPITOR) 20 MG tablet, Take 1.5 tablets (30 mg) by mouth daily  blood glucose (NO BRAND  SPECIFIED) lancets standard, Use to test blood sugar 1 time daily or as directed.  blood glucose (NO BRAND SPECIFIED) test strip, Use to test blood sugar 1 time daily or as directed.  cariprazine (VRAYLAR) 3 MG CAPS capsule, Take 4.5 mg by mouth daily   Clobetasol Propionate 0.025 % CREA, Externally apply topically daily To calves and legs  divalproex sodium delayed-release (DEPAKOTE) 500 MG DR tablet, Take 750 mg by mouth At Bedtime   hydrochlorothiazide (HYDRODIURIL) 25 MG tablet, Take 1.5 tablets (37.5 mg) by mouth daily  metFORMIN (GLUCOPHAGE-XR) 500 MG 24 hr tablet, Take 2 tablets (1,000 mg) by mouth 2 times daily (with meals) (Patient taking differently: Take 1,000 mg by mouth daily )  metoprolol succinate ER (TOPROL-XL) 25 MG 24 hr tablet, Take 2 tablets (50 mg) by mouth daily  modafinil (PROVIGIL) 200 MG tablet, TAKE 1.5 TABLETS BY MOUTH EVERY MORNING.  multivitamin w/minerals (THERA-VIT-M) tablet, Take 1 tablet by mouth daily  NONFORMULARY, by Intrathecal route continuous - + up to 4 boluses daily (100mcg each bolus usually once or 2x per day)    Managed by Dr Pawan Shaw, [facility]     Medications in Pump:  fentanyl 2000mcg/mL  Bupivacaine 20mg/mL  Morphine 5.8mg/mL     Rate:   Fentanyl 1200mcg/day  Bupivacaine 12mg/day  Morphine 4.2mg/day  Pump Last Fill Date:  09/2020  pregabalin (LYRICA) 100 MG capsule, Take 1 capsule (100 mg) by mouth 4 times daily (Patient taking differently: Take 100 mg by mouth 3 times daily )  PSYLLIUM HUSK PO,   senna-docusate (SENOKOT-S/PERICOLACE) 8.6-50 MG tablet, Take 2 tablets by mouth every evening Take 1 tablet in the morning and 2 tablets in the evening.  sitagliptin (JANUVIA) 100 MG tablet, Take 1 tablet (100 mg) by mouth daily  SUMAtriptan (IMITREX) 50 MG tablet, Take 1 tablet (50 mg) by mouth at onset of headache for migraine May repeat in 2 hours. Max 4 tablets/24 hours.  tamsulosin (FLOMAX) 0.4 MG capsule, Take 1 capsule (0.4 mg) by mouth 2 times  daily  Turmeric 400 MG CAPS, Take 1,200 mg by mouth daily   vitamin C (ASCORBIC ACID) 1000 MG TABS, Take 1,000 mg by mouth daily  Zinc Acetate, Oral, (ZINC ACETATE PO),     No current facility-administered medications on file prior to visit.       PAST SURGICAL HISTORY:  Past Surgical History:   Procedure Laterality Date     ABDOMEN SURGERY  2007    abdominal hernia     BACK SURGERY  8/6/2020     BIOPSY  june 2015    negative     BMT PROTOCOL      for systemic amyloidosis     BONE MARROW BIOPSY, BONE SPECIMEN, NEEDLE/TROCAR N/A 6/8/2016    Procedure: BIOPSY BONE MARROW;  Surgeon: Nathan Agrawal MD;  Location:  GI     BONE MARROW BIOPSY, BONE SPECIMEN, NEEDLE/TROCAR N/A 2/20/2019    Procedure: BIOPSY BONE MARROW;  Surgeon: Michael Raygoza MD;  Location:  GI     CHOLECYSTECTOMY  1995    lap qian     COLONOSCOPY  2012    hx polyps     ESOPHAGOSCOPY, GASTROSCOPY, DUODENOSCOPY (EGD), COMBINED N/A 5/29/2015    Procedure: COMBINED ESOPHAGOSCOPY, GASTROSCOPY, DUODENOSCOPY (EGD), BIOPSY SINGLE OR MULTIPLE;  Surgeon: John Jacob MD;  Location:  GI     HERNIA REPAIR, UMBILICAL  2006     [facility] NONSPECIFIC PROCEDURE  94    Cholecystectomy     [facility] NONSPECIFIC PROCEDURE  2000    repair deviated septum       ALLERGIES     Allergies   Allergen Reactions     Cephalexin Diarrhea     Liraglutide Other (See Comments)     Sulfa Drugs Swelling     Pt has taken  Taken sulfa drugs orally without trouble. He had problems with Sulfa eye drops. Eye swelled up     Victoza      Increased migraine frequency and severity       FAMILY HISTORY  Family History   Problem Relation Age of Onset     Cerebrovascular Disease Mother      C.A.D. Mother      Hypertension Mother      Alcohol/Drug Mother      Arthritis Mother      Cerebrovascular Disease Maternal Grandmother      C.A.D. Maternal Grandmother      Alcohol/Drug Maternal Grandmother      C.A.D. Father      Heart Disease Father      Hypertension Father       Alcohol/Drug Father      Allergies Father      Circulatory Father      Depression Father      Respiratory Father      Asthma Father      Alcohol/Drug Paternal Grandfather      Cerebrovascular Disease Paternal Grandfather      Depression Son      Psychotic Disorder Son         anxiety disorder     Depression Daughter      Cerebrovascular Disease Maternal Grandfather      Cerebrovascular Disease Paternal Grandmother      Diabetes Brother      Allergies Sister      Depression Sister      Gynecology Sister        VASCULAR FAMILY HISTORY  1st order relative with atherosclerotic PAD: ? Father with bad circulation   1st order relative with AAA: No  Family history of Familial Hyperlipidemia No  Family History of Hypercoagulable state:No    VASCULAR RISK FACTORS  1. Diabetes:Yes controlled  2. Smoking: has never smoked.  3. HTN: uncontrolled  4.Hyperlipidemia: Yes - uncontrolled      SOCIAL HISTORY  Social History     Socioeconomic History     Marital status:      Spouse name: Not on file     Number of children: 3     Years of education: Not on file     Highest education level: Not on file   Occupational History     Employer: [employer]   Social Needs     Financial resource strain: Not very hard     Food insecurity     Worry: Never true     Inability: Never true     Transportation needs     Medical: No     Non-medical: No   Tobacco Use     Smoking status: Never Smoker     Smokeless tobacco: Never Used   Substance and Sexual Activity     Alcohol use: Not Currently     Alcohol/week: 0.0 standard drinks     Drug use: Not Currently     Types: Cocaine, Marijuana, Methamphetamines, Opiates, IV, Amphetamines, Barbiturates, Benzodiazepines, Codeine, Fentanyl, Hashish, Hydrocodone, Hydromorphone, LSD, Oxycodone     Sexual activity: Not Currently     Partners: Female     Birth control/protection: Abstinence   Lifestyle     Physical activity     Days per week: 1 day     Minutes per session: Not on file     Stress: Not  on file   Relationships     Social connections     Talks on phone: Not on file     Gets together: Not on file     Attends [employer] service: Not on file     Active member of club or organization: Not on file     Attends meetings of clubs or organizations: Not on file     Relationship status: Not on file     Intimate partner violence     Fear of current or ex partner: Not on file     Emotionally abused: Not on file     Physically abused: Not on file     Forced sexual activity: Not on file   Other Topics Concern     Parent/sibling w/ CABG, MI or angioplasty before 65F 55M? No   Social History Narrative    Social Documentation:05/27/2010        Balanced Diet: NO    Calcium intake: 3-4 per day    Caffeine: 2 per day    Exercise:  type of activity NO    Sunscreen: Yes    Seatbelts:  Yes    Self Breast Exam:  No - na    Self Testicular Exam: no    Physical/Emotional/Sexual Abuse: No     Do you feel safe in your environment? Yes        Cholesterol screen up to date: Yes    Eye Exam up to date: Yes    Dental Exam up to date: Yes    Pap smear up to date: Does Not Apply    Mammogram up to date: Does Not Apply    Dexa Scan up to date:NO    Colonoscopy up to date:2007    Immunizations up to date: YES    Glucose screen if over 40: Yes        Sofi Rosas CMA                   ROS:   General: No change in weight, sleep or appetite.  Normal energy.  No fever or chills  Eyes: Negative for vision changes or eye problems  ENT: No problems with ears, nose or throat.  No difficulty swallowing.  Resp: No coughing, wheezing or shortness of breath  CV: No chest pains or palpitations  GI: No nausea, vomiting,  heartburn, abdominal pain, diarrhea, constipation or change in bowel habits  : No urinary frequency or dysuria, bladder or kidney problems  Musculoskeletal: No significant muscle or joint pains  Neurologic: No headaches, numbness, tingling, weakness, problems with balance or coordination  Psychiatric: No problems with anxiety,  "depression or mental health  Heme/immune/allergy: No history of bleeding or clotting problems or anemia.  No allergies or immune system problems  Endocrine: No history of thyroid disease, diabetes or other endocrine disorders  Skin:venous stasis dermatitis  Vascular: No claudication symptoms, no previous leg surgeries, leg edema   No foot or leg ulcers    EXAM:  BP (!) 138/93 (BP Location: Left arm, Patient Position: Chair, Cuff Size: Adult Regular)   Pulse 73   Resp 16   Ht 5' 7\" (1.702 m)   Wt 245 lb (111.1 kg)   SpO2 96%   BMI 38.37 kg/m    In general, the patient is a pleasant male in no apparent distress.    HEENT: NC/AT.  PERRLA.  EOMI.  Sclerae white, not injected.  Nares clear.  Pharynx without erythema or exudate.  Dentition intact.    Neck: No adenopathy.  No thyromegaly. Carotids +2/2 bilaterally without bruits.  No jugular venous distension.   Heart: RRR. Normal S1, S2 splits physiologically. No murmur, rub, click, or gallop. The PMI is in the 5th ICS in the midclavicular line. There is no heave.    Lungs: CTA.  No ronchi, wheezes, rales.  No dullness to percussion.   Abdomen: Soft, nontender, nondistended. No organomegaly. No AAA.  No bruits.   Extremities: vascular:  Bilateral lower extremity lyphedema, bilateral lower extremity varicose veins CEAP4 CVI, no venous ulceration  Venous stasis dermatitis changes noted   Decreased pedal pulses? Leg swelling related      Labs:  LIPID RESULTS:  Lab Results   Component Value Date    CHOL 107 02/16/2021    HDL 22 (L) 02/16/2021    LDL 29 02/16/2021    TRIG 279 (H) 02/16/2021    CHOLHDLRATIO 5.9 (H) 09/01/2015       LIVER ENZYME RESULTS:  Lab Results   Component Value Date    AST 24 03/05/2021    ALT 36 03/05/2021       CBC RESULTS:  Lab Results   Component Value Date    WBC 5.4 03/05/2021    RBC 3.84 (L) 03/05/2021    HGB 12.5 (L) 03/05/2021    HCT 37.7 (L) 03/05/2021    MCV 98 03/05/2021    MCH 32.6 03/05/2021    MCHC 33.2 03/05/2021    RDW 14.9 " 03/05/2021     03/05/2021       BMP RESULTS:  Lab Results   Component Value Date     03/05/2021    POTASSIUM 3.5 03/05/2021    CHLORIDE 107 03/05/2021    CO2 27 03/05/2021    ANIONGAP 7 03/05/2021     (H) 03/05/2021    BUN 28 03/05/2021    CR 1.24 03/05/2021    GFRESTIMATED 61 03/05/2021    GFRESTBLACK 70 03/05/2021    LUIS 9.2 03/05/2021        A1C RESULTS:  Lab Results   Component Value Date    A1C 5.6 11/19/2020       THYROID RESULTS:  Lab Results   Component Value Date    TSH 3.04 12/05/2020         RECENT VENOUS COMP AT VS REVIEWED 2/18/19    No DVT BLE  Rt GSV I at SFJ   RT VV incompetence  Duplicated Rt FV distally  BLE CFV I  Rt prox  FV I  LT GSV I proximal and distal calf      Assessment and Plan:     1.  Bilateral lower extremity lymphedema   2. 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 Venous stasis dermatitis:     He is obese with history of well-controlled diabetes and also history of amyloidosis status post stem cell transplant at [facility] in 2016 .   HX of NORMA uses Cpap  Clinical exam consistent with CEAP 4 CVI bilaterally and classical features of phlebolymphedema  No cutaneous scleroderma signs on exam, no history of inflammatory bowel disease.    Reviewed labs and venous competency studies as delineated above    Plan:  Continue compression stockings  Elevate the legs  Lose weight  Continue hydrochlorothiazide and may decrease dose to half a tablet that is 12.5 mg daily and monitor blood pressure  Refer to Lymphedema therapist  He has decreased pulses nate get ESEM with exercise or TBI  Local cream on legs per derm      4. Benign essential hypertension  Well controlled with current medications, cont same  Monitor blood pressure outside  Avoid NSAIDs  Lose weight  DASH diet discussed with the patient        5. Mixed hyperlipidemia  Has a previous history of severe mixed hyperlipidemia both elevated total cholesterol LDL and  triglycerides recently improved with adjustment of medications and taking atorvastatin continue the same.    6. NORMA (obstructive sleep apnea)  He has been using CPAP machine continue the same.    7. Type 2 diabetes mellitus without complication, without long-term current use of insulin (H)  Well-controlled with current lifestyle modification, recent hemoglobin A1c excellent range continue the same plan    8. Amyloidosis, unspecified type (H) s/p Stem cell tranplant at [place] 2016   Has been following up with the [facility] and also managed by Dr. Castro recently underwent bone marrow biopsy, continue the same plan    This note was dictated by utilizing dragon software  Thank you for the consultation    Total patient care time spent today more than 30 minutes , including review of previosu eval, labs, imaging studies and documentation etc  Copy of this dictation to primary care physician and referring physician      Rodrick Ferrer MD,[facility]  Vascular Medicine

## 2021-04-29 NOTE — PROGRESS NOTES
"Parmjit Hernández is a 65 year old male who presents for:  Chief Complaint   Patient presents with     RECHECK      Pt referred to [facility] by Tish Russell PA-C for Venous stasis dermatitis of both lower extremities and edema.  This is Dr. Ferrer patient- LOV 2/28/19.  *vg R/S from 04/21/21 *LB 04/20/21        Vitals:    Vitals:    04/29/21 0831 04/29/21 0832   BP: 136/89 (!) 138/93   BP Location: Right arm Left arm   Patient Position: Chair Chair   Cuff Size: Adult Regular Adult Regular   Pulse: 73    Resp: 16    SpO2: 96%    Weight: 245 lb (111.1 kg)    Height: 5' 7\" (1.702 m)        BMI:  Estimated body mass index is 38.37 kg/m  as calculated from the following:    Height as of this encounter: 5' 7\" (1.702 m).    Weight as of this encounter: 245 lb (111.1 kg).    Pain Score:  Data Unavailable        Jose Cruz Harrison CMA    "

## 2021-04-30 DIAGNOSIS — E78.5 HYPERLIPIDEMIA LDL GOAL <100: ICD-10-CM

## 2021-04-30 RX ORDER — ATORVASTATIN CALCIUM 20 MG/1
30 TABLET, FILM COATED ORAL DAILY
Start: 2021-04-30

## 2021-05-03 ENCOUNTER — TELEPHONE (OUTPATIENT)
Dept: OTHER | Facility: CLINIC | Age: 66
End: 2021-05-03

## 2021-05-03 NOTE — TELEPHONE ENCOUNTER
Follow up to 4/28/21    Please arrange for:      ESME w/exercise (with TBI)    Follow up after ESME and patient has lymphedema evaluation (referral placed).    If patient needs lymphedema referral phone number it is: [phone] for all locations, with the exception of [place], please call [phone] and [place], please call [phone].    Marivel Le RN BSN  [facility]  [phone]

## 2021-05-05 NOTE — PROGRESS NOTES
Erickson is a 65 year old who is being evaluated via a billable video visit.      How would you like to obtain your AVS? MyChart  If the video visit is dropped, the invitation should be resent by: Send to e-mail at: [email or web address]  Will anyone else be joining your video visit? Qing     Gina Rodrigues      Video Start Time: 4:29 PM  Video-Visit Details    Type of service:  Video Visit    Video End Time:4:46 PM    Originating Location (pt. Location): Home    Distant Location (provider location):  [facility]     Platform used for Video Visit: [facility]  Obstructive Sleep Apnea - PAP Follow-Up Visit:    Chief Complaint   Patient presents with     RECHECK     f/u pap use,        Parmjit Hernández comes in today for follow-up of their severe sleep apnea, managed with CPAP.     PSG on 5/28/2008 showed an AHI of 98.3 per hour and O2 saturation sawyer of 76%.     Medical history is significant for HTN, DM 2, bipolar I disorder, anxiety, OCD, amyloidosis and obesity.     Patient's sleep is affected by chronic back pain. He is planning on surgery next week.     He is prescribed Alprazolam 0.5 mg at bedtime by his Psychiatrist.     The mask is not comfortable. The mask is leaking, many nights per week.  He is not snoring with the mask on. He is not having gasp arousals.  He is not having significant oral/nasal dryness. The pressure settings are comfortable.     He uses full-face mask.     Bedtime is typically 11 pm - 2 am. Usually it takes about 10 minutes to fall asleep with the mask on. Wake time is typically 6 am.  Patient is using PAP therapy 2-3 hours per night. The patient is usually getting 4 hours of sleep per night.    He does not feel rested in the morning.    Total score - [place]: 19 (5/5/2021 11:27 AM)    Respironics    Auto-PAP 17-20 cmH2O download:  16/30 total days of use. 14 nonuse days.  Average use 2 hours 55 minutes per day.  10% days with >4 hours use.  Large leak 2 hrs 35 mins per day  average. CPAP 90% pressure 17cm. AHI 21.7          Reviewed by team: Tobacco  Allergies  Meds            Reviewed by provider:                Problem List:  Patient Active Problem List    Diagnosis Date Noted     Peripheral vascular disease (H) 02/08/2021     Priority: Medium     Shortness of breath 12/05/2020     Priority: Medium     Chest pain, unspecified type 12/05/2020     Priority: Medium     Hyperkalemia 12/03/2020     Priority: Medium     Acute kidney injury (H) 12/03/2020     Priority: Medium     Hypotension, unspecified hypotension type 12/03/2020     Priority: Medium     NSTEMI (non-ST elevated myocardial infarction) (H) 11/12/2020     Priority: Medium     Generalized muscle weakness 10/20/2020     Priority: Medium     Bilateral leg weakness 10/20/2020     Priority: Medium     Thrombocytopenia, unspecified (H) 08/03/2020     Priority: Medium     Bilateral leg edema 06/29/2020     Priority: Medium     Onychomycosis 06/29/2020     Priority: Medium     EKG abnormality 04/20/2020     Priority: Medium     Low blood pressure reading 05/16/2019     Priority: Medium     Acquired lymphedema 02/04/2019     Priority: Medium     Venous stasis dermatitis of both lower extremities 12/31/2018     Priority: Medium     Morbid obesity (H) 08/28/2018     Priority: Medium     Peripheral edema 05/20/2018     Priority: Medium     Noncardiac  Continue with  furosemide (LASIX) 20 MG tablet,   potassium       chloride SA (K-DUR/KLOR-CON M) 10 MEQ CR  Tab once daily   Basic metabolic panel       Obstructive sleep apnea syndrome 06/29/2017     Priority: Medium     sleep study       Restless legs syndrome (RLS) 06/29/2017     Priority: Medium     History of peripheral stem cell transplant (H) 12/16/2016     Priority: Medium     Insomnia due to medical condition 08/23/2016     Priority: Medium     Other amyloidosis (H) 06/01/2016     Priority: Medium      #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 7/3/2018      Dr [id number]       Chronic abdominal pain 03/15/2016     Priority: Medium     Bipolar I disorder (H) 09/01/2015     Priority: Medium     Under care of psychiatrist [facility]- Dr Rahman  doxepin 25 mg, 2 cap bedtime  DULoxetine 20 mg once daily   OLANZapine 7.5 mg  1 tab bedtime        Marianne (H) 08/20/2015     Priority: Medium     MENTAL HEALTH 08/17/2015     Priority: Medium     History of diverticulitis 01/27/2015     Priority: Medium     1/15-rxed with antibiotics       Anxiety and depression 12/18/2013     Priority: Medium     Advanced directives, counseling/discussion 05/29/2012     Priority: Medium     Discussed advance care planning with patient; information given to patient to review. 5/29/2012     Honoring choices letter mailed. 7-  Lynsey Bustamante, RT (R)         Migraine 05/29/2012     Priority: Medium     Hypertension goal BP (blood pressure) < 140/90 10/11/2011     Priority: Medium     Hyperlipidemia LDL goal <100 10/31/2010     Priority: Medium          There were no vitals taken for this visit.    Impression/Plan:     Severe sleep apnea.     - Patient has not been using CPAP that regularly and there is excess mask leak. He is planning on switching mask to nasal pillows. I counseled him on importance of regular compliance with therapy.     - He sleep maintenance difficulty due to back pain. He hopes to have improvement in pain after surgical procedure next week. He has an underlying delayed sleep phase and irregular sleep schedules. We discussed optimization of sleep wake schedules.     Plan:     1. Continue auto PAP therapy  2. I had advised titration PSG which he was reminded to schedule   3. Follow up in 3 months     Parmjit Hernández will follow up in about 3 month(s).     I spent a total of 30 minutes for this appointment today which include time spent before, during and after the visit for patient care, counseling and coordination of  care.    Ang Rodrigues MD    CC:  Vince Henry,

## 2021-05-06 ENCOUNTER — VIRTUAL VISIT (OUTPATIENT)
Dept: SLEEP MEDICINE | Facility: CLINIC | Age: 66
End: 2021-05-06
Payer: COMMERCIAL

## 2021-05-06 DIAGNOSIS — G47.33 OSA (OBSTRUCTIVE SLEEP APNEA): Primary | ICD-10-CM

## 2021-05-06 PROCEDURE — 99214 OFFICE O/P EST MOD 30 MIN: CPT | Mod: 95 | Performed by: INTERNAL MEDICINE

## 2021-05-06 NOTE — PATIENT INSTRUCTIONS
Your There is no height or weight on file to calculate BMI.  Weight management is a personal decision.  If you are interested in exploring weight loss strategies, the following discussion covers the approaches that may be successful. Body mass index (BMI) is one way to tell whether you are at a healthy weight, overweight, or obese. It measures your weight in relation to your height.  A BMI of 18.5 to 24.9 is in the healthy range. A person with a BMI of 25 to 29.9 is considered overweight, and someone with a BMI of 30 or greater is considered obese. More than two-thirds of American adults are considered overweight or obese.  Being overweight or obese increases the risk for further weight gain. Excess weight may lead to heart disease and diabetes.  Creating and following plans for healthy eating and physical activity may help you improve your health.  Weight control is part of healthy lifestyle and includes exercise, emotional health, and healthy eating habits. Careful eating habits lifelong are the mainstay of weight control. Though there are significant health benefits from weight loss, long-term weight loss with diet alone may be very difficult to achieve- studies show long-term success with dietary management in less than 10% of people. Attaining a healthy weight may be especially difficult to achieve in those with severe obesity. In some cases, medications, devices and surgical management might be considered.  What can you do?  If you are overweight or obese and are interested in methods for weight loss, you should discuss this with your provider.     Consider reducing daily calorie intake by 500 calories.     Keep a food journal.     Avoiding skipping meals, consider cutting portions instead.    Diet combined with exercise helps maintain muscle while optimizing fat loss. Strength training is particularly important for building and maintaining muscle mass. Exercise helps reduce stress, increase energy, and  improves fitness. Increasing exercise without diet control, however, may not burn enough calories to loose weight.       Start walking three days a week 10-20 minutes at a time    Work towards walking thirty minutes five days a week     Eventually, increase the speed of your walking for 1-2 minutes at time    In addition, we recommend that you review healthy lifestyles and methods for weight loss available through the National Institutes of Health patient information sites:  http://win.niddk.nih.gov/publications/index.htm    And look into health and wellness programs that may be available through your health insurance provider, employer, local community center, or ronald club.

## 2021-05-07 ENCOUNTER — OFFICE VISIT (OUTPATIENT)
Dept: FAMILY MEDICINE | Facility: CLINIC | Age: 66
End: 2021-05-07
Payer: COMMERCIAL

## 2021-05-07 ENCOUNTER — TELEPHONE (OUTPATIENT)
Dept: FAMILY MEDICINE | Facility: CLINIC | Age: 66
End: 2021-05-07

## 2021-05-07 VITALS
DIASTOLIC BLOOD PRESSURE: 84 MMHG | SYSTOLIC BLOOD PRESSURE: 125 MMHG | HEART RATE: 64 BPM | HEIGHT: 67 IN | WEIGHT: 250.1 LBS | RESPIRATION RATE: 16 BRPM | OXYGEN SATURATION: 99 % | BODY MASS INDEX: 39.25 KG/M2 | TEMPERATURE: 98.5 F

## 2021-05-07 DIAGNOSIS — E11.69 TYPE 2 DIABETES MELLITUS WITH OTHER SPECIFIED COMPLICATION, WITHOUT LONG-TERM CURRENT USE OF INSULIN (H): ICD-10-CM

## 2021-05-07 DIAGNOSIS — E78.5 HYPERLIPIDEMIA LDL GOAL <100: ICD-10-CM

## 2021-05-07 DIAGNOSIS — E11.9 TYPE 2 DIABETES MELLITUS WITHOUT COMPLICATION, WITHOUT LONG-TERM CURRENT USE OF INSULIN (H): ICD-10-CM

## 2021-05-07 DIAGNOSIS — E66.01 MORBID OBESITY (H): ICD-10-CM

## 2021-05-07 DIAGNOSIS — I10 HYPERTENSION GOAL BP (BLOOD PRESSURE) < 140/90: Primary | ICD-10-CM

## 2021-05-07 LAB — HBA1C MFR BLD: 5.8 % (ref 0–5.6)

## 2021-05-07 PROCEDURE — 99214 OFFICE O/P EST MOD 30 MIN: CPT | Performed by: FAMILY MEDICINE

## 2021-05-07 PROCEDURE — 36415 COLL VENOUS BLD VENIPUNCTURE: CPT | Performed by: FAMILY MEDICINE

## 2021-05-07 PROCEDURE — 83036 HEMOGLOBIN GLYCOSYLATED A1C: CPT | Performed by: FAMILY MEDICINE

## 2021-05-07 RX ORDER — ATORVASTATIN CALCIUM 20 MG/1
30 TABLET, FILM COATED ORAL DAILY
Qty: 135 TABLET | Refills: 0 | Status: SHIPPED | OUTPATIENT
Start: 2021-05-07 | End: 2021-08-09

## 2021-05-07 ASSESSMENT — MIFFLIN-ST. JEOR: SCORE: 1883.08

## 2021-05-07 NOTE — RESULT ENCOUNTER NOTE
Dear Erickson,   Here are your recent results. Your A1c is 0.2 points higher than your previous A1C. Continue with your weight loss journey and continue with current medications. We will re-check your A1c in 6 months.     Josue Henry MD

## 2021-05-07 NOTE — PROGRESS NOTES
Assessment & Plan     1. Hypertension goal BP (blood pressure) < 140/90  Assessment: current blood pressure is within normal ranges despite not taking any medications.  The patient states that his blood pressure elevations are likely secondary to stress.  I advised him to continue with current medications without any modification.  -Also advised him to check his blood pressure if he notices symptoms such as headache chest pain or shortness of breath.    2. Type 2 diabetes mellitus without complication, without long-term current use of insulin (H)  Glycemic control is excellent.  -Continue with Metformin 1000 mg 2 times daily.  - Hemoglobin A1c    3. Morbid obesity (H)  - Hemoglobin A1c  -Patient is currently in a weight loss diet provided by Jessica.    Return in about 3 months (around 8/7/2021) for Follow-up visit.    Vince Henry MD  [facility]    Lavinia Almendarez is a 65 year old who presents for the following health issues:    HPI     Hypertension Follow-up      Do you check your blood pressure regularly outside of the clinic? Yes     Are you following a low salt diet? No    Are your blood pressures ever more than 140 on the top number (systolic) OR more   than 90 on the bottom number (diastolic), for example 140/90? Yes      How many servings of fruits and vegetables do you eat daily?  2-3    On average, how many sweetened beverages do you drink each day (Examples: soda, juice, sweet tea, etc.  Do NOT count diet or artificially sweetened beverages)?   0    How many days per week do you exercise enough to make your heart beat faster? 3 or less    How many minutes a day do you exercise enough to make your heart beat faster? 10 - 19    How many days per week do you miss taking your medication? 0    Some stress at work.    Elevated blood pressures at home  168/98, 171/99, 160/98, 155/100, 177/108, 177/106, 117/81, 157/96. Patient had angina and it promoted him to check his blood  "pressure.   He took metoprolol 50 mg. Twice Daily yesterday. He normally skips his metoprolol if the BP is normal to avoid having low blood pressures.     Patient's blood pressure this morning at 6 am 115/74. Without taking any medications.  Currently denies any symptoms. Denies any headaches, chest pain or shortness of breath.   Patient is moving to [place] in September 2021.    Review of Systems   Constitutional, HEENT, cardiovascular, pulmonary, gi and gu systems are negative, except as otherwise noted.      Objective    /84   Pulse 64   Temp 98.5  F (36.9  C) (Tympanic)   Resp 16   Ht 1.702 m (5' 7\")   Wt 113.4 kg (250 lb 1.6 oz)   SpO2 99%   BMI 39.17 kg/m    Body mass index is 39.17 kg/m .  Physical Exam   GENERAL: healthy, alert and no distress  RESP: lungs clear to auscultation - no rales, rhonchi or wheezes  CV: regular rate and rhythm, normal S1 S2, no S3 or S4, no murmur, click or rub, no peripheral edema and peripheral pulses strong    Results for orders placed or performed in visit on 05/07/21   Hemoglobin A1c     Status: Abnormal   Result Value Ref Range    Hemoglobin A1C 5.8 (H) 0 - 5.6 %       "

## 2021-05-07 NOTE — TELEPHONE ENCOUNTER
"FS,  Pharm called requesting Lipitor (last ordered for 1 and 1/2 months). She said he has been out- last fill 3/25.  Pended another 90 days (#135) as last lab note said to recheck in 6 months. Also requesting Januva- pended #90 R1. OV notes from today not complete.  Please authorize if appropriate.  Thanks,  Norma Casillas RN          Requested Prescriptions   Pending Prescriptions Disp Refills     atorvastatin (LIPITOR) 20 MG tablet 90 tablet 0     Sig: Take 1.5 tablets (30 mg) by mouth daily       Statins Protocol Passed - 5/7/2021 10:19 AM        Passed - LDL on file in past 12 months     Recent Labs   Lab Test 02/16/21  0838   LDL 29             Passed - No abnormal creatine kinase in past 12 months     Recent Labs   Lab Test 10/20/20  0049   CKT 89                Passed - Recent (12 mo) or future (30 days) visit within the authorizing provider's specialty     Patient has had an office visit with the authorizing provider or a provider within the authorizing providers department within the previous 12 mos or has a future within next 30 days. See \"Patient Info\" tab in inbasket, or \"Choose Columns\" in Meds & Orders section of the refill encounter.              Passed - Medication is active on med list        Passed - Patient is age 18 or older           sitagliptin (JANUVIA) 100 MG tablet 90 tablet 0     Sig: Take 1 tablet (100 mg) by mouth daily       DPP4 Inhibitors Protocol Passed - 5/7/2021 10:19 AM        Passed - HgbA1C in past 3 or 6 months     If HgbA1C is 8 or greater, it needs to be on file within the past 3 months.  If less than 8, must be on file within the past 6 months.     Recent Labs   Lab Test 05/07/21  0741   A1C 5.8*             Passed - Medication is active on med list        Passed - Patient is age 18 or older        Passed - Normal serum creatinine in past 12 months     Recent Labs   Lab Test 03/05/21  1601 01/18/18  0859 01/18/18  0859   CR 1.24   < >  --    CREAT  --   --  0.9    < > = values in " "this interval not displayed.       Ok to refill medication if creatinine is low          Passed - Recent (6 mo) or future (30 days) visit within the authorizing provider's specialty     Patient had office visit in the last 6 months or has a visit in the next 30 days with authorizing provider.  See \"Patient Info\" tab in inbasket, or \"Choose Columns\" in Meds & Orders section of the refill encounter.                 "

## 2021-05-11 ENCOUNTER — TELEPHONE (OUTPATIENT)
Dept: FAMILY MEDICINE | Facility: CLINIC | Age: 66
End: 2021-05-11

## 2021-05-11 DIAGNOSIS — G62.9 PERIPHERAL POLYNEUROPATHY: ICD-10-CM

## 2021-05-11 RX ORDER — PREGABALIN 100 MG/1
100 CAPSULE ORAL 4 TIMES DAILY
Qty: 360 CAPSULE | Refills: 0 | Status: SHIPPED | OUTPATIENT
Start: 2021-05-11 | End: 2021-08-19

## 2021-05-11 NOTE — TELEPHONE ENCOUNTER
FS,    Patient called.  States he needs refill for Lyrica.  Pharmacy told him they are unable to refill, patient not sure why.    Called [facility].  State electronic signature on Rx disappeared so Rx was cancelled.    States they just need new Rx sent to them.    Order T'd up.    Please route back to triage so I can call patient and let him know.    Thanks,  Wandy Pena RN

## 2021-05-11 NOTE — TELEPHONE ENCOUNTER
"CW  Please see below message  Patient calling again, says that he is completely out of med and will start to experience pain \"right about this time if I miss a dose\"  Says he did request refill several days ago but no encounter.    Willing to refill or wait for FS tomorrow?    Thank you,  Anca Dallas RN    "

## 2021-05-12 ENCOUNTER — TELEPHONE (OUTPATIENT)
Dept: SLEEP MEDICINE | Facility: CLINIC | Age: 66
End: 2021-05-12

## 2021-05-12 ENCOUNTER — MYC MEDICAL ADVICE (OUTPATIENT)
Dept: FAMILY MEDICINE | Facility: CLINIC | Age: 66
End: 2021-05-12

## 2021-05-12 DIAGNOSIS — E11.9 TYPE 2 DIABETES MELLITUS WITHOUT COMPLICATION, WITHOUT LONG-TERM CURRENT USE OF INSULIN (H): Primary | ICD-10-CM

## 2021-05-13 ENCOUNTER — DOCUMENTATION ONLY (OUTPATIENT)
Dept: SLEEP MEDICINE | Facility: CLINIC | Age: 66
End: 2021-05-13

## 2021-05-14 RX ORDER — ALOGLIPTIN 25 MG/1
25 TABLET, FILM COATED ORAL DAILY
Qty: 30 TABLET | Refills: 0 | Status: SHIPPED | OUTPATIENT
Start: 2021-05-14 | End: 2021-05-24

## 2021-05-18 ENCOUNTER — MYC MEDICAL ADVICE (OUTPATIENT)
Dept: FAMILY MEDICINE | Facility: CLINIC | Age: 66
End: 2021-05-18

## 2021-05-19 ENCOUNTER — DOCUMENTATION ONLY (OUTPATIENT)
Dept: SLEEP MEDICINE | Facility: CLINIC | Age: 66
End: 2021-05-19

## 2021-05-19 NOTE — TELEPHONE ENCOUNTER
Yakov  Can you please contact his insurance to see which medication is covered. Sitagliptin and alogliptin were not covered.     Thanks  Elaine

## 2021-05-20 NOTE — PROGRESS NOTES
"Assessment & Plan     Functional diarrhea  Assessment: Clinical presentation and symptoms are consistent with functional diarrhea. He was advised to start an over the counter fiber supplement.   Plan:  - conservative management.     Type 2 diabetes mellitus without complication, without long-term current use of insulin (H)  - Albumin Random Urine Quantitative with Creat Ratio      Return in about 6 months (around 11/21/2021) for Follow-up visit, Follow-up for Diabetes management..    Vince Henry MD  [facility]    Lavinia Almendarez is a 65 year old who presents for the following health issues     HPI     Loose Stools   Onset/Duration: ongoing for the past 6 weeks  Description:       Consistency of stool: watery and loose       Blood in stool: no       Number of loose stools past 24 hours: 1  Progression of Symptoms: same  Accompanying signs and symptoms:       Fever: no       Nausea/Vomiting: no       Abdominal pain: no       Weight loss: no       Episodes of constipation: no  History   Ill contacts: no  Recent use of antibiotics: no  Recent travels: no  Recent medication-new or changes(Rx or OTC): no  Precipitating or alleviating factors: None  Therapies tried and outcome: none    Patient started to eat out 6 weeks ago. Loose bowel movement 1-2 per day.   Two incidents of leakage of stool in his sleep.   Watery with some clumps in it.   Denies noticing any blood.   The patient had 1 bowel in the last 24 hours.   The patient took imodium and it made him feel constipated.   The patient has been eating less than normal in the last 24 hours. Therefore not enough stool in his bowel.     Review of Systems   Constitutional, HEENT, cardiovascular, pulmonary, gi and gu systems are negative, except as otherwise noted.      Objective    /72 (BP Location: Right arm, Cuff Size: Adult Large)   Pulse 70   Temp 97.7  F (36.5  C) (Tympanic)   Ht 1.702 m (5' 7\")   Wt 113.2 kg (249 lb 8 oz)   " SpO2 99%   BMI 39.08 kg/m    Body mass index is 39.08 kg/m .  Physical Exam   GENERAL: healthy, alert and no distress  RESP: lungs clear to auscultation - no rales, rhonchi or wheezes  CV: regular rate and rhythm, normal S1 S2, no S3 or S4, no murmur, click or rub, no peripheral edema and peripheral pulses strong  ABDOMEN: soft, nontender, no hepatosplenomegaly, no masses and bowel sounds normal    Results for orders placed or performed in visit on 05/21/21   Albumin Random Urine Quantitative with Creat Ratio     Status: None   Result Value Ref Range    Creatinine Urine 113 mg/dL    Albumin Urine mg/L 13 mg/L    Albumin Urine mg/g Cr 11.68 0 - 17 mg/g Cr

## 2021-05-21 ENCOUNTER — OFFICE VISIT (OUTPATIENT)
Dept: FAMILY MEDICINE | Facility: CLINIC | Age: 66
End: 2021-05-21
Payer: COMMERCIAL

## 2021-05-21 ENCOUNTER — TELEPHONE (OUTPATIENT)
Dept: FAMILY MEDICINE | Facility: CLINIC | Age: 66
End: 2021-05-21

## 2021-05-21 VITALS
HEIGHT: 67 IN | BODY MASS INDEX: 39.16 KG/M2 | OXYGEN SATURATION: 99 % | HEART RATE: 70 BPM | SYSTOLIC BLOOD PRESSURE: 110 MMHG | DIASTOLIC BLOOD PRESSURE: 72 MMHG | TEMPERATURE: 97.7 F | WEIGHT: 249.5 LBS

## 2021-05-21 DIAGNOSIS — E11.9 TYPE 2 DIABETES MELLITUS WITHOUT COMPLICATION, WITHOUT LONG-TERM CURRENT USE OF INSULIN (H): ICD-10-CM

## 2021-05-21 DIAGNOSIS — K59.1 FUNCTIONAL DIARRHEA: Primary | ICD-10-CM

## 2021-05-21 LAB
CREAT UR-MCNC: 113 MG/DL
MICROALBUMIN UR-MCNC: 13 MG/L
MICROALBUMIN/CREAT UR: 11.68 MG/G CR (ref 0–17)

## 2021-05-21 PROCEDURE — 99214 OFFICE O/P EST MOD 30 MIN: CPT | Performed by: FAMILY MEDICINE

## 2021-05-21 PROCEDURE — 82043 UR ALBUMIN QUANTITATIVE: CPT | Performed by: FAMILY MEDICINE

## 2021-05-21 ASSESSMENT — MIFFLIN-ST. JEOR: SCORE: 1880.35

## 2021-05-21 NOTE — NURSING NOTE
"Chief Complaint   Patient presents with     Diarrhea     initial /72 (BP Location: Right arm, Cuff Size: Adult Large)   Pulse 70   Temp 97.7  F (36.5  C) (Tympanic)   Ht 1.702 m (5' 7\")   Wt 113.2 kg (249 lb 8 oz)   SpO2 99%   BMI 39.08 kg/m   Estimated body mass index is 39.08 kg/m  as calculated from the following:    Height as of this encounter: 1.702 m (5' 7\").    Weight as of this encounter: 113.2 kg (249 lb 8 oz).  BP completed using cuff size: large.   R arm      Health Maintenance that is potentially due pending provider review:  NONE    n/a    Sung Car ma  "

## 2021-05-21 NOTE — TELEPHONE ENCOUNTER
Faxed tier cost sharing exception request form to [facility].  Spoke with agent and they have received form.  Form sent to stat scanning.  Will have determination by 5/26/2021.  Informed pt.  He will be out of medication tomorrow but will speak with pharmacy about paying out of pocket for 30 day supply.

## 2021-05-21 NOTE — TELEPHONE ENCOUNTER
Prior Authorization Retail Medication Request    Medication/Dose: sitagliptin (JANUVIA) 100 MG tablet  ICD code (if different than what is on RX):    Previously Tried and Failed:    Rationale:  Sitagliptin is tier 3 with pt copay $131, please do a tier exception    Insurance Name:  Kaitlin [phone]  Insurance ID:  [id number]      Pharmacy Information (if different than what is on RX)  Name:  [facility] [address] [place] [address]  Phone:  [phone]

## 2021-05-21 NOTE — TELEPHONE ENCOUNTER
Per [facility], Alogliptin is non formulary.  Sitagliptin is tier 3 with pt copay $131 qty 30.  Agent is faxing tier exception form to clinic.  [facility] ph# [phone]  ID# [id number]  [facility] [address] [facility] ph# [phone].

## 2021-05-24 ENCOUNTER — MYC MEDICAL ADVICE (OUTPATIENT)
Dept: FAMILY MEDICINE | Facility: CLINIC | Age: 66
End: 2021-05-24

## 2021-05-24 DIAGNOSIS — E11.9 TYPE 2 DIABETES MELLITUS WITHOUT COMPLICATION, WITHOUT LONG-TERM CURRENT USE OF INSULIN (H): Primary | ICD-10-CM

## 2021-05-24 RX ORDER — GLIPIZIDE 5 MG/1
5 TABLET, FILM COATED, EXTENDED RELEASE ORAL DAILY
Qty: 30 TABLET | Refills: 3 | Status: SHIPPED | OUTPATIENT
Start: 2021-05-24 | End: 2021-06-24

## 2021-05-24 NOTE — TELEPHONE ENCOUNTER
I called the patient and left him a voice message asking him to call us back.   If he calls back, please inform him the following.  - Januvia 100mg is not covered by your insurance and our appeal for cost sharing was denied as well.   - I would recommend restarting Glipizide 10mg XR once daily instead. Let us know what you think and I will be sending a new prescription to your pharmacy.      MD Elaine

## 2021-05-26 ENCOUNTER — OFFICE VISIT (OUTPATIENT)
Dept: CARDIOLOGY | Facility: CLINIC | Age: 66
End: 2021-05-26
Payer: COMMERCIAL

## 2021-05-26 VITALS
HEART RATE: 79 BPM | BODY MASS INDEX: 40.1 KG/M2 | SYSTOLIC BLOOD PRESSURE: 154 MMHG | WEIGHT: 256 LBS | OXYGEN SATURATION: 95 % | DIASTOLIC BLOOD PRESSURE: 95 MMHG

## 2021-05-26 DIAGNOSIS — F41.9 ANXIETY AND DEPRESSION: ICD-10-CM

## 2021-05-26 DIAGNOSIS — I10 ESSENTIAL HYPERTENSION: ICD-10-CM

## 2021-05-26 DIAGNOSIS — I21.4 NSTEMI (NON-ST ELEVATED MYOCARDIAL INFARCTION) (H): ICD-10-CM

## 2021-05-26 DIAGNOSIS — E85.89 OTHER AMYLOIDOSIS (H): ICD-10-CM

## 2021-05-26 DIAGNOSIS — E11.69 TYPE 2 DIABETES MELLITUS WITH OTHER SPECIFIED COMPLICATION, UNSPECIFIED WHETHER LONG TERM INSULIN USE (H): ICD-10-CM

## 2021-05-26 DIAGNOSIS — F31.9 BIPOLAR I DISORDER (H): ICD-10-CM

## 2021-05-26 DIAGNOSIS — E66.01 MORBID OBESITY (H): ICD-10-CM

## 2021-05-26 DIAGNOSIS — F32.A ANXIETY AND DEPRESSION: ICD-10-CM

## 2021-05-26 DIAGNOSIS — G25.81 RESTLESS LEGS SYNDROME (RLS): ICD-10-CM

## 2021-05-26 DIAGNOSIS — E78.5 HYPERLIPIDEMIA LDL GOAL <100: ICD-10-CM

## 2021-05-26 DIAGNOSIS — G47.33 OBSTRUCTIVE SLEEP APNEA: ICD-10-CM

## 2021-05-26 PROCEDURE — 99214 OFFICE O/P EST MOD 30 MIN: CPT | Performed by: INTERNAL MEDICINE

## 2021-05-26 NOTE — PROGRESS NOTES
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 He underwent a coronary angiogram in 2018 through the [facility] system showing normal coronary arteries, and an echocardiogram also in 2018 showing normal LV function.    He hasn't had angina in a week or two. Not exertion related, may come on at rest. May be sitting in the chair watching TV and will get a sharp fleeting chest pain. Elevates anxiety given his history. No associated palpitations either.  No syncope. No LE edema.      He was seen by Ti a few months ago and history recapped:     The patient was admitted to [facility] because of GI issues in April 2020 but was noted to have mild troponin elevation. He was seen by his primary care physician in follow up and there was some concern about chest discomfort. An EKG and a Lexiscan stress test was ordered. He was later seen by Dr. Montague in follow up for a video visit in June 2020. The chest discomfort had resolved and was quite atypical in nature described as a rare, intermittent sharp discomfort lasting for a few seconds to a few minutes, mild in intensity. They are nonexertional, no particular shortness of breath and he specifically denied any sweating along with those episodes. This was felt to be most likely musculoskeletal in nature. The option of a cardiac MRI with stress was discussed but the patient ultimately deferred at that time since his symptoms had resolved. His EKG was reviewed and they showed sinus rhythm with inferior Q waves which appeared similar to previous EKGs in 2018 without any new changes. Troponin was again noted to be elevated and he was seen by  Dr. Stoddard in a Cardiology consultation at that time. A cardiac MRI with stress was completed on 08/18/2020 showing normal LV and RV systolic functions with no evidence of inducible ischemia. Late gadolinium enhancement imaging showed patchy delayed enhancement in the basal septal wall consistent with non CAD non specific scar. No evidence of current amyloid was noted however given elevated troponin and EKG findings, it was noted that could still have early process. A yearly cardiac MRI was suggested for ongoing surveillance. The dose of his lisinopril was increased to 20 mg a day from 15 mg, he as continued on metoprolol succinate at 50 mg a day, and triamterene-hydrochlorothiazide was added at 37.5 mg-25 mg once daily. He was discharged, but unfortunately represented to the emergency department several days later on 11/15/20 with similar chest discomfort symptoms in the setting of continued elevated blood pressure up to 218/118. EKG looked unchanged from previous on file without concern for ischemic changes and troponin was detectable but not elevated. His blood pressure improved and he was subsequently discharged home.    Blood pressure has been stable. Most of the time it is under 130 mmHg. Troponin was last elevated 5 months ago to 0.08. Through meditation can lower blood pressure, lower pulse 10-15 bpm.    EKG with low voltage, incomplete RBBB.    Cardiac MRI reviewed 8/2020     1. Normal LV and RV systolic functions.  2. Late gadolinium enhancement imaging shows patchy delayed enhancement in the basal septal wall consistent  with non CAD non specific scar.  3. No evidence of inducible ischemia on regadenoson stress perfusion.     ASSESSMENT AND PLAN:    1.  Recent Non-STEMI, likely demand MI versus cardiomyopathy  - Normal coronary angiography in 2018 at [facility] and normal recent cardiac MRI stress study negative in 2020.       2.  Hypertension  - Readings have been labile at times. Currently  well-controlled.   - lisinopril 20 mg daily, metoprolol succinate 50 mg daily, and triamterene-hydrochlorothiazide which was recently added at 37.5 mg-25 mg once daily.      3.  Systemic amyloidosis   - Cardiac MRI with stress 08/18/2020 showing normal LV and RV systolic functions with no evidence of inducible ischemia. Late gadolinium enhancement imaging showed patchy delayed enhancement in the basal septal wall consistent with non CAD, non-specific scar. No evidence of current amyloid was noted, however given elevated troponin and EKG findings of low voltage and incomplete RBBB we may need to repeat to assess for new cardiac involvement.     4.  Hyperlipidemia, treated on atorvastatin 20 mg daily     5. NORMA: on CPAP     Follow up 3 months.    Debra Stoddard MD MSc  [facility]    PAST MEDICAL HISTORY  Past Medical History:   Diagnosis Date     Acquired lymphedema 2/4/2019     Allergic state      Amyloidosis (H)     followed by hematology Dr. Romeo status post peripheral stem cell transplant     Anxiety 5/11/2016     Anxiety and depression 12/18/2013     Bipolar I disorder (H) 9/1/2015    Under care of psychiatrist [facility]- Dr Rahman doxepin 25 mg, 2 cap bedtime DULoxetine 20 mg once daily  OLANZapine 7.5 mg  1 tab bedtime      Depressive disorder 1995     EKG abnormality 4/20/2020     H/O bone marrow transplant (H)      Headache(784.0)      History of diverticulitis 1/27/2015    1/15-rxed with antibiotics      Hyperlipidemia LDL goal <100 10/31/2010     Hypertension 2007     Hypertension goal BP (blood pressure) < 140/90 10/11/2011     Marianne (H) 8/20/2015     Morbid obesity (H) 8/28/2018     Myalgia and myositis, unspecified      Narcotic dependence (H) 9/6/2016    None in about 6 months- 8/1/17     NSTEMI (non-ST elevated myocardial infarction) (H) 04/19/2020     OCD (obsessive compulsive disorder)      Other acquired absence of organ 94     Other cirrhosis of liver (H) possibly from vences  4/15/2020      Other specified viral warts      Peripheral edema 5/20/2018    Noncardiac Continue with  furosemide (LASIX) 20 MG tablet,  potassium       chloride SA (K-DUR/KLOR-CON M) 10 MEQ CR  Tab once daily  Basic metabolic panel     Polyneuropathy associated with underlying disease (H) 2/4/2019     Restless legs syndrome (RLS) 6/29/2017     Stasis dermatitis of both legs 12/31/2018     Type 2 diabetes mellitus with other specified complication (H) 7/10/2017       CURRENT MEDICATIONS  Current Outpatient Medications   Medication Sig Dispense Refill     acetaminophen (TYLENOL) 500 MG tablet Take 1,000 mg by mouth every 8 hours as needed for mild pain       alpha-lipoic acid 100 MG capsule Take 100 mg by mouth daily        ALPRAZolam (XANAX) 0.5 MG tablet Take 0.5 mg by mouth At Bedtime       aspirin (ASPIRIN LOW DOSE) 81 MG tablet Take 1 tablet (81 mg) by mouth daily 30 tablet 3     atorvastatin (LIPITOR) 20 MG tablet Take 1.5 tablets (30 mg) by mouth daily 135 tablet 0     blood glucose (NO BRAND SPECIFIED) lancets standard Use to test blood sugar 1 time daily or as directed. 100 lancet 4     blood glucose (NO BRAND SPECIFIED) test strip Use to test blood sugar 1 time daily or as directed. 200 strip 4     cariprazine (VRAYLAR) 3 MG CAPS capsule Take 4.5 mg by mouth daily        Clobetasol Propionate 0.025 % CREA Externally apply topically daily To calves and legs       divalproex sodium delayed-release (DEPAKOTE) 500 MG DR tablet Take 750 mg by mouth At Bedtime  10 tablet 0     hydrochlorothiazide (HYDRODIURIL) 25 MG tablet Take 1.5 tablets (37.5 mg) by mouth daily 45 tablet 1     metFORMIN (GLUCOPHAGE-XR) 500 MG 24 hr tablet Take 2 tablets (1,000 mg) by mouth 2 times daily (with meals) (Patient taking differently: Take 1,000 mg by mouth daily ) 360 tablet 1     metoprolol succinate ER (TOPROL-XL) 25 MG 24 hr tablet Take 2 tablets (50 mg) by mouth daily 60 tablet 1     modafinil (PROVIGIL) 200 MG tablet TAKE 1.5 TABLETS BY  MOUTH EVERY MORNING.       multivitamin w/minerals (THERA-VIT-M) tablet Take 1 tablet by mouth daily       NONFORMULARY by Intrathecal route continuous - + up to 4 boluses daily (100mcg each bolus usually once or 2x per day)    Managed by Dr Pawan Shaw [facility]     Medications in Pump:  fentanyl 2000mcg/mL  Bupivacaine 20mg/mL  Morphine 5.8mg/mL     Rate:   Fentanyl 1200mcg/day  Bupivacaine 12mg/day  Morphine 4.2mg/day  Pump Last Fill Date:  09/2020       pregabalin (LYRICA) 100 MG capsule Take 1 capsule (100 mg) by mouth 4 times daily 360 capsule 0     PSYLLIUM HUSK PO        senna-docusate (SENOKOT-S/PERICOLACE) 8.6-50 MG tablet Take 2 tablets by mouth every evening Take 1 tablet in the morning and 2 tablets in the evening.       SUMAtriptan (IMITREX) 50 MG tablet Take 1 tablet (50 mg) by mouth at onset of headache for migraine May repeat in 2 hours. Max 4 tablets/24 hours. 8 tablet 1     tamsulosin (FLOMAX) 0.4 MG capsule Take 1 capsule (0.4 mg) by mouth 2 times daily 30 capsule 3     Turmeric 400 MG CAPS Take 1,200 mg by mouth daily        vitamin C (ASCORBIC ACID) 1000 MG TABS Take 1,000 mg by mouth daily       Zinc Acetate, Oral, (ZINC ACETATE PO)        glipiZIDE (GLUCOTROL XL) 5 MG 24 hr tablet Take 1 tablet (5 mg) by mouth daily (Patient not taking: Reported on 5/26/2021) 30 tablet 3       PAST SURGICAL HISTORY:  Past Surgical History:   Procedure Laterality Date     ABDOMEN SURGERY  2007    abdominal hernia     BACK SURGERY  8/6/2020     BIOPSY  june 2015    negative     BMT PROTOCOL      for systemic amyloidosis     BONE MARROW BIOPSY, BONE SPECIMEN, NEEDLE/TROCAR N/A 6/8/2016    Procedure: BIOPSY BONE MARROW;  Surgeon: Nathan Agrawal MD;  Location:  GI     BONE MARROW BIOPSY, BONE SPECIMEN, NEEDLE/TROCAR N/A 2/20/2019    Procedure: BIOPSY BONE MARROW;  Surgeon: Michael Raygoza MD;  Location:  GI     CHOLECYSTECTOMY  1995    lap qian     COLONOSCOPY  2012    hx polyps      ESOPHAGOSCOPY, GASTROSCOPY, DUODENOSCOPY (EGD), COMBINED N/A 5/29/2015    Procedure: COMBINED ESOPHAGOSCOPY, GASTROSCOPY, DUODENOSCOPY (EGD), BIOPSY SINGLE OR MULTIPLE;  Surgeon: John Jacob MD;  Location: [facility]     HERNIA REPAIR, UMBILICAL  2006     RUST NONSPECIFIC PROCEDURE  94    Cholecystectomy     RUST NONSPECIFIC PROCEDURE  2000    repair deviated septum       ALLERGIES     Allergies   Allergen Reactions     Cephalexin Diarrhea     Liraglutide Other (See Comments)     Sulfa Drugs Swelling     Pt has taken  Taken sulfa drugs orally without trouble. He had problems with Sulfa eye drops. Eye swelled up     Victoza      Increased migraine frequency and severity       FAMILY HISTORY  Family History   Problem Relation Age of Onset     Cerebrovascular Disease Mother      C.A.D. Mother      Hypertension Mother      Alcohol/Drug Mother      Arthritis Mother      Cerebrovascular Disease Maternal Grandmother      C.A.D. Maternal Grandmother      Alcohol/Drug Maternal Grandmother      C.A.D. Father      Heart Disease Father      Hypertension Father      Alcohol/Drug Father      Allergies Father      Circulatory Father      Depression Father      Respiratory Father      Asthma Father      Alcohol/Drug Paternal Grandfather      Cerebrovascular Disease Paternal Grandfather      Depression Son      Psychotic Disorder Son         anxiety disorder     Depression Daughter      Cerebrovascular Disease Maternal Grandfather      Cerebrovascular Disease Paternal Grandmother      Diabetes Brother      Allergies Sister      Depression Sister      Gynecology Sister        SOCIAL HISTORY  Social History     Socioeconomic History     Marital status:      Spouse name: Not on file     Number of children: 3     Years of education: Not on file     Highest education level: Not on file   Occupational History     Employer: [employer]   Social Needs     Financial resource strain: Not very hard     Food insecurity      Worry: Never true     Inability: Never true     Transportation needs     Medical: No     Non-medical: No   Tobacco Use     Smoking status: Never Smoker     Smokeless tobacco: Never Used   Substance and Sexual Activity     Alcohol use: Not Currently     Alcohol/week: 0.0 standard drinks     Drug use: Not Currently     Types: Cocaine, Marijuana, Methamphetamines, Opiates, IV, Amphetamines, Barbiturates, Benzodiazepines, Codeine, Fentanyl, Hashish, Hydrocodone, Hydromorphone, LSD, Oxycodone     Sexual activity: Not Currently     Partners: Female     Birth control/protection: Abstinence   Lifestyle     Physical activity     Days per week: 1 day     Minutes per session: Not on file     Stress: Not on file   Relationships     Social connections     Talks on phone: Not on file     Gets together: Not on file     Attends Voodoo service: Not on file     Active member of club or organization: Not on file     Attends meetings of clubs or organizations: Not on file     Relationship status: Not on file     Intimate partner violence     Fear of current or ex partner: Not on file     Emotionally abused: Not on file     Physically abused: Not on file     Forced sexual activity: Not on file   Other Topics Concern     Parent/sibling w/ CABG, MI or angioplasty before 65F 55M? No   Social History Narrative    Social Documentation:05/27/2010        Balanced Diet: NO    Calcium intake: 3-4 per day    Caffeine: 2 per day    Exercise:  type of activity NO    Sunscreen: Yes    Seatbelts:  Yes    Self Breast Exam:  No - na    Self Testicular Exam: no    Physical/Emotional/Sexual Abuse: No     Do you feel safe in your environment? Yes        Cholesterol screen up to date: Yes    Eye Exam up to date: Yes    Dental Exam up to date: Yes    Pap smear up to date: Does Not Apply    Mammogram up to date: Does Not Apply    Dexa Scan up to date:NO    Colonoscopy up to date:2007    Immunizations up to date: YES    Glucose screen if over 40: Yes         Sofi Rosas CMA                   EXAM:  BP (!) 154/95   Pulse 79   Wt 116.1 kg (256 lb)   SpO2 95%   BMI 40.10 kg/m    In general, the patient is a pleasant male in no apparent distress.    HEENT: NC/AT.  PERRLA.  EOMI.  Sclerae white, not injected.    Neck: No adenopathy.  No thyromegaly. Carotids +2/2 bilaterally without bruits.  No jugular venous distension.   Heart: RRR. Normal S1, S2 splits physiologically. No murmur, rub, click, or gallop.   Lungs: CTA.  No ronchi, wheezes, rales.    Abdomen: Soft, nontender, nondistended.   Extremities: No clubbing, cyanosis, or edema.    Vascular: No bruits are noted.    Labs:  LIPID RESULTS:  Lab Results   Component Value Date    CHOL 107 02/16/2021    HDL 22 (L) 02/16/2021    LDL 29 02/16/2021    TRIG 279 (H) 02/16/2021    CHOLHDLRATIO 5.9 (H) 09/01/2015    NHDL 85 02/16/2021       LIVER ENZYME RESULTS:  Lab Results   Component Value Date    AST 24 03/05/2021    ALT 36 03/05/2021       CBC RESULTS:  Lab Results   Component Value Date    WBC 5.4 03/05/2021    RBC 3.84 (L) 03/05/2021    HGB 12.5 (L) 03/05/2021    HCT 37.7 (L) 03/05/2021    MCV 98 03/05/2021    MCH 32.6 03/05/2021    MCHC 33.2 03/05/2021    RDW 14.9 03/05/2021     03/05/2021       BMP RESULTS:  Lab Results   Component Value Date     03/05/2021    POTASSIUM 3.5 03/05/2021    CHLORIDE 107 03/05/2021    CO2 27 03/05/2021    ANIONGAP 7 03/05/2021     (H) 03/05/2021    BUN 28 03/05/2021    CR 1.24 03/05/2021    GFRESTIMATED 61 03/05/2021    GFRESTBLACK 70 03/05/2021    LUIS 9.2 03/05/2021        A1C RESULTS:  Lab Results   Component Value Date    A1C 5.8 (H) 05/07/2021

## 2021-05-26 NOTE — LETTER
5/26/2021    Vince Henry MD  [address]  [facility] [address]    RE: Parmjit VILLEDA Betty       Dear Colleague,    I had the pleasure of seeing Parmjit Hernández in the [facility].    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 He underwent a coronary angiogram in 2018 through the "[employer] [facility] system showing normal coronary arteries, and an echocardiogram also in 2018 showing normal LV function.    He hasn't had angina in a week or two. Not exertion related, may come on at rest. May be sitting in the chair watching TV and will get a sharp fleeting chest pain. Elevates anxiety given his history. No associated palpitations either.  No syncope. No LE edema.      He was seen by Ti a few months ago and history recapped:     The patient was admitted to [facility] because of GI issues in April 2020 but was noted to have mild troponin elevation. He was seen by his primary care physician in follow up and there was some concern about chest discomfort. An EKG and a Lexiscan stress test was ordered. He was later seen by Dr. Montague in follow up for a video visit in June 2020. The chest discomfort had resolved and was quite atypical in nature described as a rare, intermittent sharp discomfort lasting for a few seconds to a few minutes, mild in intensity. They are nonexertional, no particular shortness of breath and he specifically denied any sweating along with those episodes. This was felt to be most likely musculoskeletal in nature. The option of a cardiac MRI with stress was discussed but the patient ultimately deferred  at that time since his symptoms had resolved. His EKG was reviewed and they showed sinus rhythm with inferior Q waves which appeared similar to previous EKGs in 2018 without any new changes. Troponin was again noted to be elevated and he was seen by Dr. Stoddard in a Cardiology consultation at that time. A cardiac MRI with stress was completed on 08/18/2020 showing normal LV and RV systolic functions with no evidence of inducible ischemia. Late gadolinium enhancement imaging showed patchy delayed enhancement in the basal septal wall consistent with non CAD non specific scar. No evidence of current amyloid was noted however given elevated troponin and EKG findings, it was noted that could still have early process. A yearly cardiac MRI was suggested for ongoing surveillance. The dose of his lisinopril was increased to 20 mg a day from 15 mg, he as continued on metoprolol succinate at 50 mg a day, and triamterene-hydrochlorothiazide was added at 37.5 mg-25 mg once daily. He was discharged, but unfortunately represented to the emergency department several days later on 11/15/20 with similar chest discomfort symptoms in the setting of continued elevated blood pressure up to 218/118. EKG looked unchanged from previous on file without concern for ischemic changes and troponin was detectable but not elevated. His blood pressure improved and he was subsequently discharged home.    Blood pressure has been stable. Most of the time it is under 130 mmHg. Troponin was last elevated 5 months ago to 0.08. Through meditation can lower blood pressure, lower pulse 10-15 bpm.    EKG with low voltage, incomplete RBBB.    Cardiac MRI reviewed 8/2020     1. Normal LV and RV systolic functions.  2. Late gadolinium enhancement imaging shows patchy delayed enhancement in the basal septal wall consistent  with non CAD non specific scar.  3. No evidence of inducible ischemia on regadenoson stress perfusion.     ASSESSMENT AND PLAN:    1.  Recent  Non-STEMI, likely demand MI versus cardiomyopathy  - Normal coronary angiography in 2018 at [facility] and normal recent cardiac MRI stress study negative in 2020.       2.  Hypertension  - Readings have been labile at times. Currently well-controlled.   - lisinopril 20 mg daily, metoprolol succinate 50 mg daily, and triamterene-hydrochlorothiazide which was recently added at 37.5 mg-25 mg once daily.      3.  Systemic amyloidosis   - Cardiac MRI with stress 08/18/2020 showing normal LV and RV systolic functions with no evidence of inducible ischemia. Late gadolinium enhancement imaging showed patchy delayed enhancement in the basal septal wall consistent with non CAD, non-specific scar. No evidence of current amyloid was noted, however given elevated troponin and EKG findings of low voltage and incomplete RBBB we may need to repeat to assess for new cardiac involvement.     4.  Hyperlipidemia, treated on atorvastatin 20 mg daily     5. NORMA: on CPAP     Follow up 3 months.    Debra Stoddard MD MSc  [facility]    PAST MEDICAL HISTORY  Past Medical History:   Diagnosis Date     Acquired lymphedema 2/4/2019     Allergic state      Amyloidosis (H)     followed by hematology Dr. Romeo status post peripheral stem cell transplant     Anxiety 5/11/2016     Anxiety and depression 12/18/2013     Bipolar I disorder (H) 9/1/2015    Under care of psychiatrist [facility]- Dr Rahman doxepin 25 mg, 2 cap bedtime DULoxetine 20 mg once daily  OLANZapine 7.5 mg  1 tab bedtime      Depressive disorder 1995     EKG abnormality 4/20/2020     H/O bone marrow transplant (H)      Headache(784.0)      History of diverticulitis 1/27/2015    1/15-rxed with antibiotics      Hyperlipidemia LDL goal <100 10/31/2010     Hypertension 2007     Hypertension goal BP (blood pressure) < 140/90 10/11/2011     Marianne (H) 8/20/2015     Morbid obesity (H) 8/28/2018     Myalgia and myositis, unspecified      Narcotic dependence (H)  9/6/2016    None in about 6 months- 8/1/17     NSTEMI (non-ST elevated myocardial infarction) (H) 04/19/2020     OCD (obsessive compulsive disorder)      Other acquired absence of organ 94     Other cirrhosis of liver (H) possibly from vences  4/15/2020     Other specified viral warts      Peripheral edema 5/20/2018    Noncardiac Continue with  furosemide (LASIX) 20 MG tablet,  potassium       chloride SA (K-DUR/KLOR-CON M) 10 MEQ CR  Tab once daily  Basic metabolic panel     Polyneuropathy associated with underlying disease (H) 2/4/2019     Restless legs syndrome (RLS) 6/29/2017     Stasis dermatitis of both legs 12/31/2018     Type 2 diabetes mellitus with other specified complication (H) 7/10/2017       CURRENT MEDICATIONS  Current Outpatient Medications   Medication Sig Dispense Refill     acetaminophen (TYLENOL) 500 MG tablet Take 1,000 mg by mouth every 8 hours as needed for mild pain       alpha-lipoic acid 100 MG capsule Take 100 mg by mouth daily        ALPRAZolam (XANAX) 0.5 MG tablet Take 0.5 mg by mouth At Bedtime       aspirin (ASPIRIN LOW DOSE) 81 MG tablet Take 1 tablet (81 mg) by mouth daily 30 tablet 3     atorvastatin (LIPITOR) 20 MG tablet Take 1.5 tablets (30 mg) by mouth daily 135 tablet 0     blood glucose (NO BRAND SPECIFIED) lancets standard Use to test blood sugar 1 time daily or as directed. 100 lancet 4     blood glucose (NO BRAND SPECIFIED) test strip Use to test blood sugar 1 time daily or as directed. 200 strip 4     cariprazine (VRAYLAR) 3 MG CAPS capsule Take 4.5 mg by mouth daily        Clobetasol Propionate 0.025 % CREA Externally apply topically daily To calves and legs       divalproex sodium delayed-release (DEPAKOTE) 500 MG DR tablet Take 750 mg by mouth At Bedtime  10 tablet 0     hydrochlorothiazide (HYDRODIURIL) 25 MG tablet Take 1.5 tablets (37.5 mg) by mouth daily 45 tablet 1     metFORMIN (GLUCOPHAGE-XR) 500 MG 24 hr tablet Take 2 tablets (1,000 mg) by mouth 2 times daily  (with meals) (Patient taking differently: Take 1,000 mg by mouth daily ) 360 tablet 1     metoprolol succinate ER (TOPROL-XL) 25 MG 24 hr tablet Take 2 tablets (50 mg) by mouth daily 60 tablet 1     modafinil (PROVIGIL) 200 MG tablet TAKE 1.5 TABLETS BY MOUTH EVERY MORNING.       multivitamin w/minerals (THERA-VIT-M) tablet Take 1 tablet by mouth daily       NONFORMULARY by Intrathecal route continuous - + up to 4 boluses daily (100mcg each bolus usually once or 2x per day)    Managed by [facility]     Medications in Pump:  fentanyl 2000mcg/mL  Bupivacaine 20mg/mL  Morphine 5.8mg/mL     Rate:   Fentanyl 1200mcg/day  Bupivacaine 12mg/day  Morphine 4.2mg/day  Pump Last Fill Date:  09/2020       pregabalin (LYRICA) 100 MG capsule Take 1 capsule (100 mg) by mouth 4 times daily 360 capsule 0     PSYLLIUM HUSK PO        senna-docusate (SENOKOT-S/PERICOLACE) 8.6-50 MG tablet Take 2 tablets by mouth every evening Take 1 tablet in the morning and 2 tablets in the evening.       SUMAtriptan (IMITREX) 50 MG tablet Take 1 tablet (50 mg) by mouth at onset of headache for migraine May repeat in 2 hours. Max 4 tablets/24 hours. 8 tablet 1     tamsulosin (FLOMAX) 0.4 MG capsule Take 1 capsule (0.4 mg) by mouth 2 times daily 30 capsule 3     Turmeric 400 MG CAPS Take 1,200 mg by mouth daily        vitamin C (ASCORBIC ACID) 1000 MG TABS Take 1,000 mg by mouth daily       Zinc Acetate, Oral, (ZINC ACETATE PO)        glipiZIDE (GLUCOTROL XL) 5 MG 24 hr tablet Take 1 tablet (5 mg) by mouth daily (Patient not taking: Reported on 5/26/2021) 30 tablet 3       PAST SURGICAL HISTORY:  Past Surgical History:   Procedure Laterality Date     ABDOMEN SURGERY  2007    abdominal hernia     BACK SURGERY  8/6/2020     BIOPSY  june 2015    negative     BMT PROTOCOL      for systemic amyloidosis     BONE MARROW BIOPSY, BONE SPECIMEN, NEEDLE/TROCAR N/A 6/8/2016    Procedure: BIOPSY BONE MARROW;  Surgeon: Nathan Agrawal  MD Jose;  Location:  GI     BONE MARROW BIOPSY, BONE SPECIMEN, NEEDLE/TROCAR N/A 2/20/2019    Procedure: BIOPSY BONE MARROW;  Surgeon: Michael Raygoza MD;  Location:  GI     CHOLECYSTECTOMY  1995    lap qian     COLONOSCOPY  2012    hx polyps     ESOPHAGOSCOPY, GASTROSCOPY, DUODENOSCOPY (EGD), COMBINED N/A 5/29/2015    Procedure: COMBINED ESOPHAGOSCOPY, GASTROSCOPY, DUODENOSCOPY (EGD), BIOPSY SINGLE OR MULTIPLE;  Surgeon: John Jacob MD;  Location:  GI     HERNIA REPAIR, UMBILICAL  2006     [facility] NONSPECIFIC PROCEDURE  94    Cholecystectomy     [facility] NONSPECIFIC PROCEDURE  2000    repair deviated septum       ALLERGIES     Allergies   Allergen Reactions     Cephalexin Diarrhea     Liraglutide Other (See Comments)     Sulfa Drugs Swelling     Pt has taken  Taken sulfa drugs orally without trouble. He had problems with Sulfa eye drops. Eye swelled up     Victoza      Increased migraine frequency and severity       FAMILY HISTORY  Family History   Problem Relation Age of Onset     Cerebrovascular Disease Mother      C.A.D. Mother      Hypertension Mother      Alcohol/Drug Mother      Arthritis Mother      Cerebrovascular Disease Maternal Grandmother      C.A.D. Maternal Grandmother      Alcohol/Drug Maternal Grandmother      C.A.D. Father      Heart Disease Father      Hypertension Father      Alcohol/Drug Father      Allergies Father      Circulatory Father      Depression Father      Respiratory Father      Asthma Father      Alcohol/Drug Paternal Grandfather      Cerebrovascular Disease Paternal Grandfather      Depression Son      Psychotic Disorder Son         anxiety disorder     Depression Daughter      Cerebrovascular Disease Maternal Grandfather      Cerebrovascular Disease Paternal Grandmother      Diabetes Brother      Allergies Sister      Depression Sister      Gynecology Sister        SOCIAL HISTORY  Social History     Socioeconomic History     Marital status:       Spouse name: Not on file     Number of children: 3     Years of education: Not on file     Highest education level: Not on file   Occupational History     Employer: [employer]   Social Needs     Financial resource strain: Not very hard     Food insecurity     Worry: Never true     Inability: Never true     Transportation needs     Medical: No     Non-medical: No   Tobacco Use     Smoking status: Never Smoker     Smokeless tobacco: Never Used   Substance and Sexual Activity     Alcohol use: Not Currently     Alcohol/week: 0.0 standard drinks     Drug use: Not Currently     Types: Cocaine, Marijuana, Methamphetamines, Opiates, IV, Amphetamines, Barbiturates, Benzodiazepines, Codeine, Fentanyl, Hashish, Hydrocodone, Hydromorphone, LSD, Oxycodone     Sexual activity: Not Currently     Partners: Female     Birth control/protection: Abstinence   Lifestyle     Physical activity     Days per week: 1 day     Minutes per session: Not on file     Stress: Not on file   Relationships     Social connections     Talks on phone: Not on file     Gets together: Not on file     Attends Confucianism service: Not on file     Active member of club or organization: Not on file     Attends meetings of clubs or organizations: Not on file     Relationship status: Not on file     Intimate partner violence     Fear of current or ex partner: Not on file     Emotionally abused: Not on file     Physically abused: Not on file     Forced sexual activity: Not on file   Other Topics Concern     Parent/sibling w/ CABG, MI or angioplasty before 65F 55M? No   Social History Narrative    Social Documentation:05/27/2010        Balanced Diet: NO    Calcium intake: 3-4 per day    Caffeine: 2 per day    Exercise:  type of activity NO    Sunscreen: Yes    Seatbelts:  Yes    Self Breast Exam:  No - na    Self Testicular Exam: no    Physical/Emotional/Sexual Abuse: No     Do you feel safe in your environment? Yes        Cholesterol screen up to date: Yes     Eye Exam up to date: Yes    Dental Exam up to date: Yes    Pap smear up to date: Does Not Apply    Mammogram up to date: Does Not Apply    Dexa Scan up to date:NO    Colonoscopy up to date:2007    Immunizations up to date: YES    Glucose screen if over 40: Yes        Sofi Rosas CMA                   EXAM:  BP (!) 154/95   Pulse 79   Wt 116.1 kg (256 lb)   SpO2 95%   BMI 40.10 kg/m    In general, the patient is a pleasant male in no apparent distress.    HEENT: NC/AT.  PERRLA.  EOMI.  Sclerae white, not injected.    Neck: No adenopathy.  No thyromegaly. Carotids +2/2 bilaterally without bruits.  No jugular venous distension.   Heart: RRR. Normal S1, S2 splits physiologically. No murmur, rub, click, or gallop.   Lungs: CTA.  No ronchi, wheezes, rales.    Abdomen: Soft, nontender, nondistended.   Extremities: No clubbing, cyanosis, or edema.    Vascular: No bruits are noted.    Labs:  LIPID RESULTS:  Lab Results   Component Value Date    CHOL 107 02/16/2021    HDL 22 (L) 02/16/2021    LDL 29 02/16/2021    TRIG 279 (H) 02/16/2021    CHOLHDLRATIO 5.9 (H) 09/01/2015    NHDL 85 02/16/2021       LIVER ENZYME RESULTS:  Lab Results   Component Value Date    AST 24 03/05/2021    ALT 36 03/05/2021       CBC RESULTS:  Lab Results   Component Value Date    WBC 5.4 03/05/2021    RBC 3.84 (L) 03/05/2021    HGB 12.5 (L) 03/05/2021    HCT 37.7 (L) 03/05/2021    MCV 98 03/05/2021    MCH 32.6 03/05/2021    MCHC 33.2 03/05/2021    RDW 14.9 03/05/2021     03/05/2021       BMP RESULTS:  Lab Results   Component Value Date     03/05/2021    POTASSIUM 3.5 03/05/2021    CHLORIDE 107 03/05/2021    CO2 27 03/05/2021    ANIONGAP 7 03/05/2021     (H) 03/05/2021    BUN 28 03/05/2021    CR 1.24 03/05/2021    GFRESTIMATED 61 03/05/2021    GFRESTBLACK 70 03/05/2021    LUIS 9.2 03/05/2021        A1C RESULTS:  Lab Results   Component Value Date    A1C 5.8 (H) 05/07/2021     Thank you for allowing me to participate in the  care of your patient.      Sincerely,     Debra Stoddard MD     [facility]    cc:   Foreign Dent NP  [address] [address]

## 2021-05-27 ENCOUNTER — TRANSFERRED RECORDS (OUTPATIENT)
Dept: HEALTH INFORMATION MANAGEMENT | Facility: CLINIC | Age: 66
End: 2021-05-27

## 2021-05-27 LAB — RETINOPATHY: NEGATIVE

## 2021-06-01 ENCOUNTER — HOSPITAL ENCOUNTER (OUTPATIENT)
Dept: LAB | Facility: CLINIC | Age: 66
End: 2021-06-01
Attending: FAMILY MEDICINE
Payer: COMMERCIAL

## 2021-06-01 ENCOUNTER — HOSPITAL ENCOUNTER (OUTPATIENT)
Dept: ULTRASOUND IMAGING | Facility: CLINIC | Age: 66
End: 2021-06-01
Attending: INTERNAL MEDICINE
Payer: COMMERCIAL

## 2021-06-01 DIAGNOSIS — R16.1 SPLEEN ENLARGED: ICD-10-CM

## 2021-06-01 DIAGNOSIS — K74.60 CIRRHOSIS OF LIVER WITHOUT ASCITES, UNSPECIFIED HEPATIC CIRRHOSIS TYPE (H): ICD-10-CM

## 2021-06-01 DIAGNOSIS — E85.89 OTHER AMYLOIDOSIS (H): ICD-10-CM

## 2021-06-01 DIAGNOSIS — I73.9 PERIPHERAL VASCULAR DISEASE (H): ICD-10-CM

## 2021-06-01 LAB
AFP SERPL-MCNC: <1.5 UG/L (ref 0–8)
ALBUMIN SERPL-MCNC: 3.4 G/DL (ref 3.4–5)
ALP SERPL-CCNC: 81 U/L (ref 40–150)
ALT SERPL W P-5'-P-CCNC: 31 U/L (ref 0–70)
ANION GAP SERPL CALCULATED.3IONS-SCNC: 8 MMOL/L (ref 3–14)
AST SERPL W P-5'-P-CCNC: 17 U/L (ref 0–45)
BASOPHILS # BLD AUTO: 0.1 10E9/L (ref 0–0.2)
BASOPHILS NFR BLD AUTO: 1.1 %
BILIRUB SERPL-MCNC: 0.4 MG/DL (ref 0.2–1.3)
BUN SERPL-MCNC: 19 MG/DL (ref 7–30)
CALCIUM SERPL-MCNC: 8.3 MG/DL (ref 8.5–10.1)
CHLORIDE SERPL-SCNC: 102 MMOL/L (ref 94–109)
CO2 SERPL-SCNC: 26 MMOL/L (ref 20–32)
CREAT SERPL-MCNC: 0.88 MG/DL (ref 0.66–1.25)
DIFFERENTIAL METHOD BLD: ABNORMAL
EOSINOPHIL # BLD AUTO: 0.3 10E9/L (ref 0–0.7)
EOSINOPHIL NFR BLD AUTO: 4.8 %
ERYTHROCYTE [DISTWIDTH] IN BLOOD BY AUTOMATED COUNT: 12.9 % (ref 10–15)
GFR SERPL CREATININE-BSD FRML MDRD: >90 ML/MIN/{1.73_M2}
GLUCOSE SERPL-MCNC: 141 MG/DL (ref 70–99)
HCT VFR BLD AUTO: 39.1 % (ref 40–53)
HGB BLD-MCNC: 13.5 G/DL (ref 13.3–17.7)
IMM GRANULOCYTES # BLD: 0 10E9/L (ref 0–0.4)
IMM GRANULOCYTES NFR BLD: 0.7 %
LYMPHOCYTES # BLD AUTO: 0.8 10E9/L (ref 0.8–5.3)
LYMPHOCYTES NFR BLD AUTO: 14.1 %
MCH RBC QN AUTO: 31.5 PG (ref 26.5–33)
MCHC RBC AUTO-ENTMCNC: 34.5 G/DL (ref 31.5–36.5)
MCV RBC AUTO: 91 FL (ref 78–100)
MONOCYTES # BLD AUTO: 0.4 10E9/L (ref 0–1.3)
MONOCYTES NFR BLD AUTO: 7.5 %
NEUTROPHILS # BLD AUTO: 3.9 10E9/L (ref 1.6–8.3)
NEUTROPHILS NFR BLD AUTO: 71.8 %
NRBC # BLD AUTO: 0 10*3/UL
NRBC BLD AUTO-RTO: 0 /100
PLATELET # BLD AUTO: 132 10E9/L (ref 150–450)
POTASSIUM SERPL-SCNC: 3.9 MMOL/L (ref 3.4–5.3)
PROT SERPL-MCNC: 6.5 G/DL (ref 6.8–8.8)
RBC # BLD AUTO: 4.29 10E12/L (ref 4.4–5.9)
SODIUM SERPL-SCNC: 136 MMOL/L (ref 133–144)
WBC # BLD AUTO: 5.5 10E9/L (ref 4–11)

## 2021-06-01 PROCEDURE — 85025 COMPLETE CBC W/AUTO DIFF WBC: CPT | Performed by: INTERNAL MEDICINE

## 2021-06-01 PROCEDURE — 82105 ALPHA-FETOPROTEIN SERUM: CPT | Performed by: INTERNAL MEDICINE

## 2021-06-01 PROCEDURE — 76700 US EXAM ABDOM COMPLETE: CPT

## 2021-06-01 PROCEDURE — 80053 COMPREHEN METABOLIC PANEL: CPT | Performed by: INTERNAL MEDICINE

## 2021-06-01 PROCEDURE — 36415 COLL VENOUS BLD VENIPUNCTURE: CPT | Performed by: INTERNAL MEDICINE

## 2021-06-01 PROCEDURE — 93924 LWR XTR VASC STDY BILAT: CPT

## 2021-06-02 ENCOUNTER — VIRTUAL VISIT (OUTPATIENT)
Dept: ONCOLOGY | Facility: CLINIC | Age: 66
End: 2021-06-02
Attending: INTERNAL MEDICINE
Payer: COMMERCIAL

## 2021-06-02 DIAGNOSIS — E85.89 OTHER AMYLOIDOSIS (H): Primary | ICD-10-CM

## 2021-06-02 DIAGNOSIS — K74.60 CIRRHOSIS OF LIVER WITHOUT ASCITES, UNSPECIFIED HEPATIC CIRRHOSIS TYPE (H): ICD-10-CM

## 2021-06-02 PROCEDURE — 99214 OFFICE O/P EST MOD 30 MIN: CPT | Mod: 95 | Performed by: INTERNAL MEDICINE

## 2021-06-02 NOTE — PROGRESS NOTES
Erickson is a 65 year old who is being evaluated via a billable video visit.      How would you like to obtain your AVS? MyChart  If the video visit is dropped, the invitation should be resent by: Text to cell phone: [phone]  Will anyone else be joining your video visit? No      Video Start Time:  Video-Visit Details    Type of service:  Video Visit    Video End Time:    Originating Location (pt. Location): Home    Distant Location (provider location):  [facility]     Platform used for Video Visit: [employer]Well

## 2021-06-02 NOTE — LETTER
6/2/2021         RE: Parmjit Hernández  [address]  [facility] [address]        Dear Colleague,    Thank you for referring your patient, Parmjit Hernández, to the [facility]. Please see a copy of my visit note below.    Erickson is a 65 year old who is being evaluated via a billable video visit.      How would you like to obtain your AVS? MyChart  If the video visit is dropped, the invitation should be resent by: Text to cell phone: [phone]  Will anyone else be joining your video visit? No      Video Start Time:  Video-Visit Details    Type of service:  Video Visit    Video End Time:    Originating Location (pt. Location): Home    Distant Location (provider location):  [facility]     Platform used for Video Visit: OROS    HEMATOLOGY HISTORY: Mr. Hernández is a gentleman with amyloidosis, liver cirrhosis and splenic lesion.    1. Systemic AL amyloidosis treated with autologous stem cell transplant December 27, 2016 at [facility] ER.  2. On 04/15/2020:   -Normal WBC, hemoglobin and platelets.   -Normal potassium and creatinine.   -UA does not reveal any infection.   -COVID-19 has been ruled out.   -CT abdomen and pelvis reveals liver cirrhosis and portal venous hypertension with splenomegaly.  There is hypodensity in the lateral aspect of the spleen, which could be hemangioma or splenic infarct.  Spleen is enlarged at 16 cm.  There are multiple small bilateral pelvic lymph nodes, likely reactive.     SUBJECTIVE:  Mr. Hernández is a 65-year-old gentleman with systemic AL amyloidosis.  He is in remission.     The patient also has alcoholic liver cirrhosis.  He has quit alcohol.  He has not seen his hepatologist for a long time.     Overall, his condition is stable.  The patient mentioned that he has been under stress.  Because of that, his blood pressure and pulse often have been elevated.     REVIEW OF SYSTEMS:  He has fatigue.  No worsening.  No  headache.  No dizziness.  No chest pain.  No shortness of breath.  No nausea or vomiting.  No bleeding.  Appetite has been fairly good.     The patient was at [facility] in 12/2020.  Multiple labs were done for followup of amyloidosis.  He is in remission.     PHYSICAL EXAMINATION:  He is alert and oriented x 3.  Not in any distress.    No cough. No labored breathing.   The rest of a comprehensive physical exam was deferred due to public health emergency video visit restrictions.    LABS: CBC, CMP and AFP reviewed.     ASSESSMENT:   1.  A 65-year-old gentleman with systemic AL amyloidosis.  He is in complete remission.  2.  Liver cirrhosis.  3.  Splenomegaly from liver cirrhosis.  4.  Chronic thrombocytopenia, stable.  5.  Stress.  6.  Elevated blood pressure from stress.     PLAN:    1.  Discussed regarding amyloidosis.  He is doing well.  He is in remission.     We will continue to monitor him.  In 6 months' time, he is going to [facility] and he will have labs done there.  I will see him in 1 year's time with labs.  He is agreeable for it.     2.  The patient has liver cirrhosis.  I advised him to follow up with his hepatologist.     We did do some labs.  His LFT and alpha fetoprotein are normal.  He recently had an abdominal ultrasound done yesterday.  There is no evidence of any malignancy.  There is hepatic steatosis and liver cirrhosis.  Mild splenomegaly.  No liver mass.     3.  His blood pressure is elevated because of stress.  I am hoping his stress will get better.  He has Xanax at home.  I told him to take it as needed.     4.  He had multiple questions, which were all answered.  I reassured the patient that he is in remission from amyloidosis.       Total video visit time of 25 minutes.       This office note has been dictated.          Again, thank you for allowing me to participate in the care of your patient.        Sincerely,        Grace Zepeda MD

## 2021-06-02 NOTE — PATIENT INSTRUCTIONS
1. See me in 1 year with labs.  2. Follow-up with gastro-enterologist. He has been to [facility] before. (patient will call)    Please call to schedule      Patient would like a call back in April of 2022

## 2021-06-02 NOTE — LETTER
6/2/2021         RE: Parmjit Hernández  [address]  [facility] [id number]        Dear Colleague,    Thank you for referring your patient, Parmjit Hernández, to the [facility]. Please see a copy of my visit note below.    Erickson is a 65 year old who is being evaluated via a billable video visit.      How would you like to obtain your AVS? MyChart  If the video visit is dropped, the invitation should be resent by: Text to cell phone: [phone]  Will anyone else be joining your video visit? No      Video Start Time:  Video-Visit Details    Type of service:  Video Visit    Video End Time:    Originating Location (pt. Location): Home    Distant Location (provider location):  [facility]     Platform used for Video Visit: Gyst    HEMATOLOGY HISTORY: Mr. Hernández is a gentleman with amyloidosis, liver cirrhosis and splenic lesion.    1. Systemic AL amyloidosis treated with autologous stem cell transplant December 27, 2016 at [facility].  2. On 04/15/2020:   -Normal WBC, hemoglobin and platelets.   -Normal potassium and creatinine.   -UA does not reveal any infection.   -COVID-19 has been ruled out.   -CT abdomen and pelvis reveals liver cirrhosis and portal venous hypertension with splenomegaly.  There is hypodensity in the lateral aspect of the spleen, which could be hemangioma or splenic infarct.  Spleen is enlarged at 16 cm.  There are multiple small bilateral pelvic lymph nodes, likely reactive.     SUBJECTIVE:  Mr. Hernández is a 65-year-old gentleman with systemic AL amyloidosis.  He is in remission.     The patient also has alcoholic liver cirrhosis.  He has quit alcohol.  He has not seen his hepatologist for a long time.     Overall, his condition is stable.  The patient mentioned that he has been under stress.  Because of that, his blood pressure and pulse often have been elevated.     REVIEW OF SYSTEMS:  He has fatigue.  No worsening.  No  headache.  No dizziness.  No chest pain.  No shortness of breath.  No nausea or vomiting.  No bleeding.  Appetite has been fairly good.     The patient was at [facility] in 12/2020.  Multiple labs were done for followup of amyloidosis.  He is in remission.     PHYSICAL EXAMINATION:  He is alert and oriented x 3.  Not in any distress.    No cough. No labored breathing.   The rest of a comprehensive physical exam was deferred due to public health emergency video visit restrictions.    LABS: CBC, CMP and AFP reviewed.     ASSESSMENT:   1.  A 65-year-old gentleman with systemic AL amyloidosis.  He is in complete remission.  2.  Liver cirrhosis.  3.  Splenomegaly from liver cirrhosis.  4.  Chronic thrombocytopenia, stable.  5.  Stress.  6.  Elevated blood pressure from stress.     PLAN:    1.  Discussed regarding amyloidosis.  He is doing well.  He is in remission.     We will continue to monitor him.  In 6 months' time, he is going to [facility] and he will have labs done there.  I will see him in 1 year's time with labs.  He is agreeable for it.     2.  The patient has liver cirrhosis.  I advised him to follow up with his hepatologist.     We did do some labs.  His LFT and alpha fetoprotein are normal.  He recently had an abdominal ultrasound done yesterday.  There is no evidence of any malignancy.  There is hepatic steatosis and liver cirrhosis.  Mild splenomegaly.  No liver mass.     3.  His blood pressure is elevated because of stress.  I am hoping his stress will get better.  He has Xanax at home.  I told him to take it as needed.     4.  He had multiple questions, which were all answered.  I reassured the patient that he is in remission from amyloidosis.       Total video visit time of 25 minutes.       This office note has been dictated.          Again, thank you for allowing me to participate in the care of your patient.        Sincerely,        Grace Zepeda MD

## 2021-06-03 ENCOUNTER — TRANSFERRED RECORDS (OUTPATIENT)
Dept: HEALTH INFORMATION MANAGEMENT | Facility: CLINIC | Age: 66
End: 2021-06-03

## 2021-06-06 ENCOUNTER — MYC MEDICAL ADVICE (OUTPATIENT)
Dept: FAMILY MEDICINE | Facility: CLINIC | Age: 66
End: 2021-06-06

## 2021-06-07 VITALS — DIASTOLIC BLOOD PRESSURE: 81 MMHG | SYSTOLIC BLOOD PRESSURE: 118 MMHG

## 2021-06-09 ENCOUNTER — DOCUMENTATION ONLY (OUTPATIENT)
Dept: SLEEP MEDICINE | Facility: CLINIC | Age: 66
End: 2021-06-09

## 2021-06-12 NOTE — PROGRESS NOTES
HEMATOLOGY HISTORY: Mr. Hernández is a gentleman with amyloidosis, liver cirrhosis and splenic lesion.    1. 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  2. On 04/15/2020:   -Normal WBC, hemoglobin and platelets.   -Normal potassium and creatinine.   -UA does not reveal any infection.   -COVID-19 has been ruled out.   -CT abdomen and pelvis reveals liver cirrhosis and portal venous hypertension with splenomegaly.  There is hypodensity in the lateral aspect of the spleen, which could be hemangioma or splenic infarct.  Spleen is enlarged at 16 cm.  There are multiple small bilateral pelvic lymph nodes, likely reactive.     SUBJECTIVE:  Mr. Hernández is a 65-year-old gentleman with systemic AL amyloidosis.  He is in remission.     The patient also has alcoholic liver cirrhosis.  He has quit alcohol.  He has not seen his hepatologist for a long time.     Overall, his condition is stable.  The patient mentioned that he has been under stress.  Because of that, his blood pressure and pulse often have been elevated.     REVIEW OF SYSTEMS:  He has fatigue.  No worsening.  No headache.  No dizziness.  No chest pain.  No shortness of breath.  No nausea or vomiting.  No bleeding.  Appetite has been fairly good.     The patient was at [facility] in 12/2020.  Multiple labs were done for followup of amyloidosis.  He is in remission.     PHYSICAL EXAMINATION:  He is alert and oriented x 3.  Not in any distress.    No cough. No labored breathing.   The rest of a comprehensive physical exam was deferred due to public health emergency video visit restrictions.    LABS: CBC, CMP and AFP reviewed.     ASSESSMENT:   1.  A 65-year-old gentleman with systemic AL amyloidosis.  He is in complete remission.  2.  Liver cirrhosis.  3.  Splenomegaly from liver cirrhosis.  4.  Chronic thrombocytopenia, stable.  5.  Stress.  6.  Elevated blood pressure from stress.     PLAN:    1.   Discussed regarding amyloidosis.  He is doing well.  He is in remission.     We will continue to monitor him.  In 6 months' time, he is going to [facility] and he will have labs done there.  I will see him in 1 year's time with labs.  He is agreeable for it.     2.  The patient has liver cirrhosis.  I advised him to follow up with his hepatologist.     We did do some labs.  His LFT and alpha fetoprotein are normal.  He recently had an abdominal ultrasound done yesterday.  There is no evidence of any malignancy.  There is hepatic steatosis and liver cirrhosis.  Mild splenomegaly.  No liver mass.     3.  His blood pressure is elevated because of stress.  I am hoping his stress will get better.  He has Xanax at home.  I told him to take it as needed.     4.  He had multiple questions, which were all answered.  I reassured the patient that he is in remission from amyloidosis.       Total video visit time of 25 minutes.

## 2021-06-13 ENCOUNTER — MYC MEDICAL ADVICE (OUTPATIENT)
Dept: FAMILY MEDICINE | Facility: CLINIC | Age: 66
End: 2021-06-13

## 2021-06-14 ENCOUNTER — DOCUMENTATION ONLY (OUTPATIENT)
Dept: HOME HEALTH SERVICES | Facility: CLINIC | Age: 66
End: 2021-06-14

## 2021-06-14 DIAGNOSIS — G47.33 OSA (OBSTRUCTIVE SLEEP APNEA): Primary | ICD-10-CM

## 2021-06-14 NOTE — PROGRESS NOTES
DATE: 6/14/2021  LOCATION OF MASK FITTING: [place] [facility]  REASON FOR MASK FITTING: LEAKAGE  DISCUSSED/VIEWED THE FOLLOWING MASKS:  PT HAS A SIMPLUS LARGE FF MASK. HE IS GETTING A LOT OF LEAKAGE. HIS MASK IS A YEAR AND HALF OLD AND THERE WAS A RIP IN HIS CUSHION. I EXPLAINED THAT IS MOST LIKELY THE REASON FOR HIS LEAKAGE. WE DISCUSSED HOW OFTEN HE CAN GET NEW SUPPLIES.  HE WAS DIDN'T LIKE THE ITEM OF SOMETHING UP HIS NOSE, SO I SHOWED HIM OTHER FF AND NASAL MASKS.  AIRFIT N20 LARGE  AIRFIT F20  RESPIRONICS PREMIUM CHIN STRAP    PT LIKED THE IDEA OF THE N20 AND A CHIN STRAP, FOR IF HE OPENS HIS MOUTH. I SHOWED HIM WE CAN REPLACE HIS FF CUSHION FOR THE SIMPLUS IN A MONTH IF THE NASAL MASK AND CHIN STRAP ARE NOT BETTER THAN HIS CURRENT SITUATION.    MASK AND SIZE SELECTED: AIRFIT N20 LARGE

## 2021-06-15 ENCOUNTER — TELEPHONE (OUTPATIENT)
Dept: FAMILY MEDICINE | Facility: CLINIC | Age: 66
End: 2021-06-15

## 2021-06-15 NOTE — TELEPHONE ENCOUNTER
FS,   Patient had steroid injection at [facility] yesterday   Was told to watch BS  BS fasting this AM was 264  BS 4 hours later after eating breakfast was 369  States he feels fine - no hyperglycemia s/s at this time  On Metformin and Glipizide for diabetes  Wants to see if you want him to take any extra medication at this time d/t highs from steroid  Please advise  Thanks,  Stacie MAYA RN

## 2021-06-18 DIAGNOSIS — I10 HYPERTENSION GOAL BP (BLOOD PRESSURE) < 140/90: ICD-10-CM

## 2021-06-18 RX ORDER — HYDROCHLOROTHIAZIDE 25 MG/1
37.5 TABLET ORAL DAILY
Qty: 135 TABLET | Refills: 0 | Status: SHIPPED | OUTPATIENT
Start: 2021-06-18 | End: 2021-08-30

## 2021-06-18 NOTE — TELEPHONE ENCOUNTER
Prescription approved per [facility] Refill Protocol.  Stacie MAYA RN

## 2021-06-22 ENCOUNTER — MYC MEDICAL ADVICE (OUTPATIENT)
Dept: FAMILY MEDICINE | Facility: CLINIC | Age: 66
End: 2021-06-22

## 2021-06-22 DIAGNOSIS — E11.9 TYPE 2 DIABETES MELLITUS WITHOUT COMPLICATION, WITHOUT LONG-TERM CURRENT USE OF INSULIN (H): Primary | ICD-10-CM

## 2021-06-23 ENCOUNTER — MYC MEDICAL ADVICE (OUTPATIENT)
Dept: FAMILY MEDICINE | Facility: CLINIC | Age: 66
End: 2021-06-23

## 2021-06-23 NOTE — TELEPHONE ENCOUNTER
FS  Please see Wet message  Pt currently taking glipiZIDE (GLUCOTROL XL) 5 MG 24 hr tablet  Please advise.    Thank you,  Anca Dallas, RN

## 2021-06-24 RX ORDER — GLIPIZIDE 2.5 MG/1
2.5 TABLET, EXTENDED RELEASE ORAL DAILY
Qty: 30 TABLET | Refills: 0 | Status: SHIPPED | OUTPATIENT
Start: 2021-06-24 | End: 2021-07-16

## 2021-06-28 DIAGNOSIS — I10 HYPERTENSION GOAL BP (BLOOD PRESSURE) < 140/90: ICD-10-CM

## 2021-06-28 RX ORDER — METOPROLOL SUCCINATE 25 MG/1
50 TABLET, EXTENDED RELEASE ORAL DAILY
Qty: 180 TABLET | Refills: 0 | Status: SHIPPED | OUTPATIENT
Start: 2021-06-28 | End: 2023-05-11

## 2021-07-01 DIAGNOSIS — G93.32 CHRONIC FATIGUE SYNDROME: ICD-10-CM

## 2021-07-01 DIAGNOSIS — E11.65 UNCONTROLLED TYPE 2 DIABETES MELLITUS WITH HYPERGLYCEMIA (H): Primary | ICD-10-CM

## 2021-07-01 DIAGNOSIS — E55.9 VITAMIN D DEFICIENCY: ICD-10-CM

## 2021-07-01 DIAGNOSIS — R79.0 ABNORMAL LEVEL OF BLOOD MINERAL: ICD-10-CM

## 2021-07-01 DIAGNOSIS — E63.0 ESSENTIAL FATTY ACID DEFICIENCY: ICD-10-CM

## 2021-07-01 DIAGNOSIS — T56.894A TOXIC EFFECT OF OTHER METALS, UNDETERMINED, INITIAL ENCOUNTER: ICD-10-CM

## 2021-07-01 DIAGNOSIS — A69.20 LYME DISEASE: ICD-10-CM

## 2021-07-02 LAB
MISCELLANEOUS TEST: NORMAL
RESULT: NORMAL
SEND OUTS MISC TEST CODE: NORMAL
SEND OUTS MISC TEST SPECIMEN: NORMAL
TEST NAME: NORMAL

## 2021-07-07 ENCOUNTER — TELEPHONE (OUTPATIENT)
Dept: SLEEP MEDICINE | Facility: CLINIC | Age: 66
End: 2021-07-07

## 2021-07-07 ENCOUNTER — OFFICE VISIT (OUTPATIENT)
Dept: OTHER | Facility: CLINIC | Age: 66
End: 2021-07-07
Attending: INTERNAL MEDICINE
Payer: COMMERCIAL

## 2021-07-07 VITALS
SYSTOLIC BLOOD PRESSURE: 123 MMHG | RESPIRATION RATE: 18 BRPM | HEIGHT: 67 IN | BODY MASS INDEX: 39.71 KG/M2 | HEART RATE: 90 BPM | DIASTOLIC BLOOD PRESSURE: 85 MMHG | OXYGEN SATURATION: 95 % | WEIGHT: 253 LBS

## 2021-07-07 DIAGNOSIS — E11.59 TYPE 2 DIABETES MELLITUS WITH OTHER CIRCULATORY COMPLICATION, WITHOUT LONG-TERM CURRENT USE OF INSULIN (H): ICD-10-CM

## 2021-07-07 DIAGNOSIS — G47.33 OSA (OBSTRUCTIVE SLEEP APNEA): ICD-10-CM

## 2021-07-07 DIAGNOSIS — I87.2 VENOUS STASIS DERMATITIS OF BOTH LOWER EXTREMITIES: ICD-10-CM

## 2021-07-07 DIAGNOSIS — I10 BENIGN ESSENTIAL HYPERTENSION: ICD-10-CM

## 2021-07-07 DIAGNOSIS — E85.89 OTHER AMYLOIDOSIS (H): ICD-10-CM

## 2021-07-07 DIAGNOSIS — I73.9 PERIPHERAL VASCULAR DISEASE (H): ICD-10-CM

## 2021-07-07 DIAGNOSIS — I89.0 LYMPHEDEMA OF BOTH LOWER EXTREMITIES: Primary | ICD-10-CM

## 2021-07-07 PROCEDURE — 99214 OFFICE O/P EST MOD 30 MIN: CPT | Performed by: INTERNAL MEDICINE

## 2021-07-07 PROCEDURE — G0463 HOSPITAL OUTPT CLINIC VISIT: HCPCS

## 2021-07-07 ASSESSMENT — MIFFLIN-ST. JEOR: SCORE: 1896.23

## 2021-07-07 NOTE — PROGRESS NOTES
"[facility]        Patient is here for a follow up  to discuss about US results    Patient's condition is stable.    /85 (BP Location: Left arm, Patient Position: Chair, Cuff Size: Adult Large)   Pulse 90   Resp 18   Ht 5' 7\" (1.702 m)   Wt 253 lb (114.8 kg)   SpO2 95%   BMI 39.63 kg/m      The provider has been notified that the patient has no concerns.     Questions patient would like addressed today are: N/A.    Refills are needed: No    At the end of the visit the patient was provided with education both verbally and on their AVS regarding follow up appointment(s).        Jose Cruz Harrison, [facility]      "

## 2021-07-07 NOTE — PROGRESS NOTES
[facility] VASCULAR MEDICINE VISIT      PRIMARY HEALTH CARE PROVIDER:  Vince Henry MD      REASON FOR VISIT:   Follow up , review of labs and imaging studies  Follow up visit  For skin issues of legs , seen derm using clobetosol  underwent venous comp studies at  2 years ago and recent venous duplex negative for DVT  Complex and complicated medical HX   Previous  RF, CCP, HARRY, centromere , scleroderma panel negative  Hx amyloidosis underwent stem cell transplant few years ago at [place]  Excellent BP with adding diuretic  Recent ESME normal  Scheduled to see lymphedema therapist next week     HPI:    This is a 65-year-old very pleasant male with history of multiple medical problems initially seen more than 2 years ago for evaluation and management of vascular causes of Rt leg ulcer developed 2 weeks prior to last  Visit on lateral aspect of mid portion of leg. No pain, no fever, no injury, no claudication. Known DM well controlled ( A1c 5.3 in dec 2018). 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 Perez. . Non smoker, no Hx of DVT 1/2019 BLE venous duplex negative for DVT. Father HX of scleroderma . He underwent rheum panel  all of them are normal. He denies any arthralgias,swallowing problems. Previous EGD ,  Colonoscopy ( B9 polyps removed) capsule endoscopy negative.   Polyneuropathy and chronic back pain     Reviewed previous venous comp results  He was seen and evaluated by dermatologist initiated steroid cream for his venous stasis dermatitis of both lower extremities.  He has a leg swelling with venous insufficiency and no history of a DVT recent venous duplex negative.  Improved compared to last visit  Using compression stockings  Few years ago he underwent screening ESME at home which was normal and last month ESME normal   Known history of NORMA using CPAP machine  On 6/1/2021  ESME normal      PAST MEDICAL HISTORY  Past Medical History:   Diagnosis Date     Acquired lymphedema 2/4/2019     Allergic state      Amyloidosis (H)     followed by hematology Dr. Romeo status post peripheral stem cell transplant     Anxiety 5/11/2016     Anxiety and depression 12/18/2013     Bipolar I disorder (H) 9/1/2015    Under care of [facility]- Dr Rahman doxepin 25 mg, 2 cap bedtime DULoxetine 20 mg once daily  OLANZapine 7.5 mg  1 tab bedtime      Depressive disorder 1995     EKG abnormality 4/20/2020     H/O bone marrow transplant (H)      Headache(784.0)      History of diverticulitis 1/27/2015    1/15-rxed with antibiotics      Hyperlipidemia LDL goal <100 10/31/2010     Hypertension 2007     Hypertension goal BP (blood pressure) < 140/90 10/11/2011     Marianne (H) 8/20/2015     Morbid obesity (H) 8/28/2018     Myalgia and myositis, unspecified      Narcotic dependence (H) 9/6/2016    None in about 6 months- 8/1/17     NSTEMI (non-ST elevated myocardial infarction) (H) 04/19/2020     OCD (obsessive compulsive disorder)      Other acquired absence of organ 94     Other cirrhosis of liver (H) possibly from vences  4/15/2020     Other specified viral warts      Peripheral edema 5/20/2018    Noncardiac Continue with  furosemide (LASIX) 20 MG tablet,  potassium       chloride SA (K-DUR/KLOR-CON M) 10 MEQ CR  Tab once daily  Basic metabolic panel     Polyneuropathy associated with underlying disease (H) 2/4/2019     Restless legs syndrome (RLS) 6/29/2017     Stasis dermatitis of both legs 12/31/2018     Type 2 diabetes mellitus with other specified complication (H) 7/10/2017       CURRENT MEDICATIONS  acetaminophen (TYLENOL) 500 MG tablet, Take 1,000 mg by mouth every 8 hours as needed for mild pain  alpha-lipoic acid 100 MG capsule, Take 100 mg by mouth daily   ALPRAZolam (XANAX) 0.5 MG tablet, Take 0.5 mg by mouth At Bedtime  aspirin (ASPIRIN LOW DOSE) 81 MG tablet, Take 1 tablet (81 mg) by mouth  daily  atorvastatin (LIPITOR) 20 MG tablet, Take 1.5 tablets (30 mg) by mouth daily  blood glucose (NO BRAND SPECIFIED) lancets standard, Use to test blood sugar 1 time daily or as directed.  blood glucose (NO BRAND SPECIFIED) test strip, Use to test blood sugar 1 time daily or as directed.  cariprazine (VRAYLAR) 3 MG CAPS capsule, Take 4.5 mg by mouth daily   Clobetasol Propionate 0.025 % CREA, Externally apply topically daily To calves and legs  divalproex sodium delayed-release (DEPAKOTE) 500 MG DR tablet, Take 750 mg by mouth At Bedtime   hydrochlorothiazide (HYDRODIURIL) 25 MG tablet, Take 1.5 tablets (37.5 mg) by mouth daily  metFORMIN (GLUCOPHAGE-XR) 500 MG 24 hr tablet, Take 2 tablets (1,000 mg) by mouth 2 times daily (with meals) (Patient taking differently: Take 1,000 mg by mouth daily )  metoprolol succinate ER (TOPROL-XL) 25 MG 24 hr tablet, Take 2 tablets (50 mg) by mouth daily  modafinil (PROVIGIL) 200 MG tablet, TAKE 1.5 TABLETS BY MOUTH EVERY MORNING.  multivitamin w/minerals (THERA-VIT-M) tablet, Take 1 tablet by mouth daily  NONFORMULARY, by Intrathecal route continuous - + up to 4 boluses daily (100mcg each bolus usually once or 2x per day)    Managed by Dr Pawan Shaw, [facility]     Medications in Pump:  fentanyl 2000mcg/mL  Bupivacaine 20mg/mL  Morphine 5.8mg/mL     Rate:   Fentanyl 1200mcg/day  Bupivacaine 12mg/day  Morphine 4.2mg/day  Pump Last Fill Date:  09/2020  pregabalin (LYRICA) 100 MG capsule, Take 1 capsule (100 mg) by mouth 4 times daily  PSYLLIUM HUSK PO,   senna-docusate (SENOKOT-S/PERICOLACE) 8.6-50 MG tablet, Take 2 tablets by mouth every evening Take 1 tablet in the morning and 2 tablets in the evening.  SUMAtriptan (IMITREX) 50 MG tablet, Take 1 tablet (50 mg) by mouth at onset of headache for migraine May repeat in 2 hours. Max 4 tablets/24 hours.  tamsulosin (FLOMAX) 0.4 MG capsule, Take 1 capsule (0.4 mg) by mouth 2 times daily  Turmeric 400 MG CAPS, Take 1,200 mg by  mouth daily   vitamin C (ASCORBIC ACID) 1000 MG TABS, Take 1,000 mg by mouth daily  Zinc Acetate, Oral, (ZINC ACETATE PO),   glipiZIDE (GLUCOTROL XL) 2.5 MG 24 hr tablet, Take 1 tablet (2.5 mg) by mouth daily    No current facility-administered medications on file prior to visit.       PAST SURGICAL HISTORY:  Past Surgical History:   Procedure Laterality Date     ABDOMEN SURGERY  2007    abdominal hernia     BACK SURGERY  8/6/2020     BIOPSY  june 2015    negative     BMT PROTOCOL      for systemic amyloidosis     BONE MARROW BIOPSY, BONE SPECIMEN, NEEDLE/TROCAR N/A 6/8/2016    Procedure: BIOPSY BONE MARROW;  Surgeon: Nathan Agrawal MD;  Location:  GI     BONE MARROW BIOPSY, BONE SPECIMEN, NEEDLE/TROCAR N/A 2/20/2019    Procedure: BIOPSY BONE MARROW;  Surgeon: Michael Raygoza MD;  Location:  GI     CHOLECYSTECTOMY  1995    lap qian     COLONOSCOPY  2012    hx polyps     ESOPHAGOSCOPY, GASTROSCOPY, DUODENOSCOPY (EGD), COMBINED N/A 5/29/2015    Procedure: COMBINED ESOPHAGOSCOPY, GASTROSCOPY, DUODENOSCOPY (EGD), BIOPSY SINGLE OR MULTIPLE;  Surgeon: John Jacob MD;  Location:  GI     HERNIA REPAIR, UMBILICAL  2006     [facility] NONSPECIFIC PROCEDURE  94    Cholecystectomy     [facility] NONSPECIFIC PROCEDURE  2000    repair deviated septum       ALLERGIES     Allergies   Allergen Reactions     Cephalexin Diarrhea     Liraglutide Other (See Comments)     Sulfa Drugs Swelling     Pt has taken  Taken sulfa drugs orally without trouble. He had problems with Sulfa eye drops. Eye swelled up     Victoza      Increased migraine frequency and severity       FAMILY HISTORY  Family History   Problem Relation Age of Onset     Cerebrovascular Disease Mother      C.A.D. Mother      Hypertension Mother      Alcohol/Drug Mother      Arthritis Mother      Cerebrovascular Disease Maternal Grandmother      C.A.D. Maternal Grandmother      Alcohol/Drug Maternal Grandmother      C.A.D. Father      Heart Disease  Father      Hypertension Father      Alcohol/Drug Father      Allergies Father      Circulatory Father      Depression Father      Respiratory Father      Asthma Father      Alcohol/Drug Paternal Grandfather      Cerebrovascular Disease Paternal Grandfather      Depression Son      Psychotic Disorder Son         anxiety disorder     Depression Daughter      Cerebrovascular Disease Maternal Grandfather      Cerebrovascular Disease Paternal Grandmother      Diabetes Brother      Allergies Sister      Depression Sister      Gynecology Sister        VASCULAR FAMILY HISTORY  1st order relative with atherosclerotic PAD: ? Father with bad circulation   1st order relative with AAA: No  Family history of Familial Hyperlipidemia No  Family History of Hypercoagulable state:No    VASCULAR RISK FACTORS  1. Diabetes:Yes controlled  2. Smoking: has never smoked.  3. HTN: uncontrolled  4.Hyperlipidemia: Yes - uncontrolled      SOCIAL HISTORY  Social History     Socioeconomic History     Marital status:      Spouse name: Not on file     Number of children: 3     Years of education: Not on file     Highest education level: Not on file   Occupational History     Employer: [employer]   Social Needs     Financial resource strain: Not very hard     Food insecurity     Worry: Never true     Inability: Never true     Transportation needs     Medical: No     Non-medical: No   Tobacco Use     Smoking status: Never Smoker     Smokeless tobacco: Never Used   Substance and Sexual Activity     Alcohol use: Not Currently     Alcohol/week: 0.0 standard drinks     Drug use: Not Currently     Types: Cocaine, Marijuana, Methamphetamines, Opiates, IV, Amphetamines, Barbiturates, Benzodiazepines, Codeine, Fentanyl, Hashish, Hydrocodone, Hydromorphone, LSD, Oxycodone     Sexual activity: Not Currently     Partners: Female     Birth control/protection: Abstinence   Lifestyle     Physical activity     Days per week: 1 day     Minutes per  session: Not on file     Stress: Not on file   Relationships     Social connections     Talks on phone: Not on file     Gets together: Not on file     Attends Jehovah's witness service: Not on file     Active member of club or organization: Not on file     Attends meetings of clubs or organizations: Not on file     Relationship status: Not on file     Intimate partner violence     Fear of current or ex partner: Not on file     Emotionally abused: Not on file     Physically abused: Not on file     Forced sexual activity: Not on file   Other Topics Concern     Parent/sibling w/ CABG, MI or angioplasty before 65F 55M? No   Social History Narrative    Social Documentation:05/27/2010        Balanced Diet: NO    Calcium intake: 3-4 per day    Caffeine: 2 per day    Exercise:  type of activity NO    Sunscreen: Yes    Seatbelts:  Yes    Self Breast Exam:  No - na    Self Testicular Exam: no    Physical/Emotional/Sexual Abuse: No     Do you feel safe in your environment? Yes        Cholesterol screen up to date: Yes    Eye Exam up to date: Yes    Dental Exam up to date: Yes    Pap smear up to date: Does Not Apply    Mammogram up to date: Does Not Apply    Dexa Scan up to date:NO    Colonoscopy up to date:2007    Immunizations up to date: YES    Glucose screen if over 40: Yes        Sofi Rosas CMA                   ROS:   General: No change in weight, sleep or appetite.  Normal energy.  No fever or chills  Eyes: Negative for vision changes or eye problems  ENT: No problems with ears, nose or throat.  No difficulty swallowing.  Resp: No coughing, wheezing or shortness of breath  CV: No chest pains or palpitations  GI: No nausea, vomiting,  heartburn, abdominal pain, diarrhea, constipation or change in bowel habits  : No urinary frequency or dysuria, bladder or kidney problems  Musculoskeletal: No significant muscle or joint pains  Neurologic: No headaches, numbness, tingling, weakness, problems with balance or  "coordination  Psychiatric: No problems with anxiety, depression or mental health  Heme/immune/allergy: No history of bleeding or clotting problems or anemia.  No allergies or immune system problems  Endocrine: No history of thyroid disease, diabetes or other endocrine disorders  Skin:venous stasis dermatitis slightly improved compared to last visit  Vascular: No claudication symptoms, no previous leg surgeries, leg edema slightly improved.  No foot or leg ulcers    EXAM:  /85 (BP Location: Left arm, Patient Position: Chair, Cuff Size: Adult Large)   Pulse 90   Resp 18   Ht 5' 7\" (1.702 m)   Wt 253 lb (114.8 kg)   SpO2 95%   BMI 39.63 kg/m    In general, the patient is a pleasant male in no apparent distress.    HEENT: NC/AT.  PERRLA.  EOMI.  Sclerae white, not injected.  Nares clear.  Pharynx without erythema or exudate.  Dentition intact.    Neck: No adenopathy.  No thyromegaly. Carotids +2/2 bilaterally without bruits.  No jugular venous distension.   Heart: RRR. Normal S1, S2 splits physiologically. No murmur, rub, click, or gallop. The PMI is in the 5th ICS in the midclavicular line. There is no heave.    Lungs: CTA.  No ronchi, wheezes, rales.  No dullness to percussion.   Abdomen: Soft, nontender, nondistended. No organomegaly. No AAA.  No bruits.   Extremities: vascular:  Bilateral lower extremity classical  Lymphedema,squaring of toes, positive stemmer sign, dorsal hump of foot , lose of leg contour etc.   bilateral lower extremity varicose veins CEAP4 CVI, no venous ulceration  Venous stasis dermatitis changes noted   Decreased pedal pulses? Leg swelling related, dopplerable bi-triphasic pedal pulses.      Labs:  LIPID RESULTS:  Lab Results   Component Value Date    CHOL 107 02/16/2021    HDL 22 (L) 02/16/2021    LDL 29 02/16/2021    TRIG 279 (H) 02/16/2021    CHOLHDLRATIO 5.9 (H) 09/01/2015       LIVER ENZYME RESULTS:  Lab Results   Component Value Date    AST 17 06/01/2021    ALT 31 06/01/2021 "       CBC RESULTS:  Lab Results   Component Value Date    WBC 5.5 06/01/2021    RBC 4.29 (L) 06/01/2021    HGB 13.5 06/01/2021    HCT 39.1 (L) 06/01/2021    MCV 91 06/01/2021    MCH 31.5 06/01/2021    MCHC 34.5 06/01/2021    RDW 12.9 06/01/2021     (L) 06/01/2021       BMP RESULTS:  Lab Results   Component Value Date     06/01/2021    POTASSIUM 3.9 06/01/2021    CHLORIDE 102 06/01/2021    CO2 26 06/01/2021    ANIONGAP 8 06/01/2021     (H) 06/01/2021    BUN 19 06/01/2021    CR 0.88 06/01/2021    GFRESTIMATED >90 06/01/2021    GFRESTBLACK >90 06/01/2021    LUIS 8.3 (L) 06/01/2021        A1C RESULTS:  Lab Results   Component Value Date    A1C 5.8 (H) 05/07/2021       THYROID RESULTS:  Lab Results   Component Value Date    TSH 3.04 12/05/2020         RECENT VENOUS COMP AT VS REVIEWED 2/18/19    No DVT BLE  Rt GSV I at SFJ   RT VV incompetence  Duplicated Rt FV distally  BLE CFV I  Rt prox  FV I  LT GSV I proximal and distal calf      IR ESME US ESME DOPPLER WITH EXERCISE BILATERAL   6/1/2021 9:58 AM      HISTORY: Peripheral vascular disease. Bilateral lower extremity  claudication symptoms.     COMPARISON: None.     FINDINGS:  Right ESME:   DP: 1.16  PT: 1.15.     Left ESME:   DP: 1.16   PT: 1.12.     Right Digital brachial index: 0.88.  Left Digital Brachial index: 0.82     Waveforms: Triphasic in the distal tibial arteries     Exercise: The patient walked on a treadmill for 2 minutes at a 10%  incline and at a speed of 1.5 miles per hour. Exercise was stopped due  to shortness of breath.     Right exercise ESME: 1.16.  Left exercise ESME: 1.18                                                                      IMPRESSION: Findings do not indicate significant lower extremity  arterial insufficiency.     ESME CRITERIA:  >0.95 Normal  0.90 - 0.94 Mild  0.5 - 0.89 Moderate  0.2 - 0.49 Severe  <0.2 Critical     DIGITAL BRACHIAL DIAGNOSTIC CRITERIA     > 0.7                                                      Normal  0.5-0.7                                                 Mild PAD  0.35-0.5                                               Moderate PAD  <0.35 & Toe pressure 40mmHG      Moderate to Severe PAD  <0.35 & Toe pressure <30mmHG    Severe PAD     KAYLEEN DOOLEY MD      Assessment and Plan:     1.  Bilateral lower extremity lymphedema ( Phlebo-Lymphedema)   2. 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 Venous stasis dermatitis:     He is obese with history of well-controlled diabetes ( A1c 5.8) and also history of amyloidosis status post stem cell transplant at [facility] in 2016 .   HX of NORMA uses Cpap  Clinical exam consistent with CEAP 4 CVI bilaterally and classical features of phlebo-lymphedema  No cutaneous scleroderma signs on exam, no history of inflammatory bowel disease.  Recent ESME normal    Reviewed labs and venous competency studies as delineated above    Plan:  Continue compression stockings  Elevate the legs  Lose weight  Continue hydrochlorothiazide and may decrease dose to half a tablet that is 12.5 mg daily and monitor blood pressure  See  Lymphedema therapist  Local cream on legs per derm  Please wear CPAP       4. Benign essential hypertension  Well controlled with current medications, cont same  Monitor blood pressure outside  Avoid NSAIDs  Lose weight  DASH diet discussed with the patient    5. Mixed hyperlipidemia  Has a previous history of severe mixed hyperlipidemia both elevated total cholesterol LDL and triglycerides recently improved with adjustment of medications and taking atorvastatin continue the same.    6. NORMA (obstructive sleep apnea)  He has been using CPAP machine which was recalled , start using as soon as they replace     7. Type 2 diabetes mellitus without complication, without long-term current use of insulin (H)  Well-controlled with current lifestyle modification, recent hemoglobin A1c excellent range continue the same  plan    8. Amyloidosis, unspecified type (H) s/p Stem cell tranplant at [place] 2016   Has been following up with the [facility] and also managed by Dr. Castro recently underwent bone marrow biopsy, continue the same plan    This note was dictated by utilizing dragon software  Thank you for the consultation    Total patient care time spent today more than 30 minutes , including review of previous eval, labs, imaging studies and documentation etc  avs with written instructions given     RTC in 4 months    Copy of this dictation to primary care physician and referring physician      Rodrick Ferrer MD,[facility]  Vascular Medicine

## 2021-07-07 NOTE — PATIENT INSTRUCTIONS
1. Please see lymphedema therapist and follow recommendations    2. Please wear CPAP and lose weight     3. Elevate legs when able and walk as much as you can.    See me in 4 months

## 2021-07-07 NOTE — TELEPHONE ENCOUNTER
Reason for call:  Other   Patient called regarding (reason for call): CPAP recall  Additional comments: Pt's CPAP has been recalled, please call to advise    Phone number to reach patient:  Home number on file [phone] (home)    Best Time:  any    Can we leave a detailed message on this number?  YES    Travel screening: Not Applicable

## 2021-07-08 ENCOUNTER — TRANSFERRED RECORDS (OUTPATIENT)
Dept: HEALTH INFORMATION MANAGEMENT | Facility: CLINIC | Age: 66
End: 2021-07-08

## 2021-07-08 NOTE — TELEPHONE ENCOUNTER
Patient call regarding Posto7 recall for their pap device.    Device type:: Auto CPAP    Supplemental oxygen: No , if yes moved to advanced device workflow.     Current durable medical equipment provider: [facility]     Age of your current device:  greater than 5 years old    History review:     Does the patient have the following?      COPD No     Hypoventilation No    Pulmonary hypertension No    Neuromuscular disease related respiratory problems No    History of past or present cardiac arrhythmia  No    History of heart failure  No    Recent hospitalization for breathing problems No       Other concerns:    DOT license requiring treatment of obstructive sleep apnea occupation that requires operation of hazardous equipment  No    Extreme sleepiness or drowsy driving prior to using CPAP or BiPAP treatment? No       Discontinuation of PAP therapy would lead to substantial deterioration of functional status or quality of life.no       If no to all questions:     Advised patient to discontinue use of the device.     Has patient registered device? No Advised patient to register for repair or replacement on the [employer] website.  Patient can call [employer] at [phone] for additional support.        Discuss alternative treatments, including positional therapy, oral appliance therapy, and surgery.    Discussed behavioral strategies such as weight loss, exercise, and avoidance of alcohol and sedatives before bedtime.    Does the patient have additional questions or concerns to be sent to the provider at this time?  Yes    Plan :     Patient would like to discuss alternative therapy with his sleep provider

## 2021-07-12 ENCOUNTER — TRANSFERRED RECORDS (OUTPATIENT)
Dept: HEALTH INFORMATION MANAGEMENT | Facility: CLINIC | Age: 66
End: 2021-07-12

## 2021-07-12 LAB
ALBUMIN (URINE) MG/SPEC: 0.7 MG/DL
ALBUMIN/CREATININE RATIO: 7 MCG/MG CREAT
ALT SERPL-CCNC: 29 U/L (ref 9–46)
AST SERPL-CCNC: 23 U/L (ref 10–35)
CHOLESTEROL (EXTERNAL): 126 MG/DL
CREATININE (EXTERNAL): 0.99 MG/DL (ref 0.7–1.25)
CREATININE (URINE): 96 MG/DL (ref 20–320)
GFR ESTIMATED (EXTERNAL): 80 ML/MIN/1.73M2
GFR ESTIMATED (IF AFRICAN AMERICAN) (EXTERNAL): 93 ML/MIN/1.73M2
GLUCOSE (EXTERNAL): 101 MG/DL (ref 65–99)
HBA1C MFR BLD: 6 %
HDLC SERPL-MCNC: 32 MG/DL
LDL CHOLESTEROL (EXTERNAL): 70 MG/DL
NON HDL CHOLESTEROL (EXTERNAL): 94 MG/DL
POTASSIUM (EXTERNAL): 3.4 MMOL/L (ref 3.5–5.3)
TRIGLYCERIDES (EXTERNAL): 164 MG/DL
TSH SERPL-ACNC: 3.43 MIU/L (ref 0.4–4.5)

## 2021-07-14 ENCOUNTER — HOSPITAL ENCOUNTER (OUTPATIENT)
Dept: OCCUPATIONAL THERAPY | Facility: CLINIC | Age: 66
Setting detail: THERAPIES SERIES
End: 2021-07-14
Attending: INTERNAL MEDICINE
Payer: COMMERCIAL

## 2021-07-14 PROCEDURE — 97140 MANUAL THERAPY 1/> REGIONS: CPT | Mod: GO

## 2021-07-14 PROCEDURE — 97165 OT EVAL LOW COMPLEX 30 MIN: CPT | Mod: GO

## 2021-07-14 NOTE — PROGRESS NOTES
07/14/21 1245   Quick Adds   Quick Adds Certification   Rehab Discipline   Discipline OT   Type of Visit   Type of visit Initial Edema Evaluation   General Information   Start of care 07/14/21   Referring physician Rodrick Ferrer MD   Orders Evaluate and treat as indicated   Order date 04/29/21   Medical diagnosis lymphedema   Onset of illness / date of surgery 04/29/21   Edema onset 04/29/21   Affected body parts LLE;RLE   Edema etiology Chronic Venous Insufficiency   Edema etiology comments Pt with chronic CVI and lymphedema.  Pt did CDT in 2019 and reports he has been well controlled since then.  He manages with compression stockings but has flares occasionally.  Pt has hx of wounds but these are closed now   Pertinent history of current problem (PT: include personal factors and/or comorbidities that impact the POC; OT: include additional occupational profile info) Pt with chronic pain and chronic fatigue syndrome.  Has pain pump for abdominal pain.     Surgical / medical history reviewed Yes   Prior level of functional mobility ind   Prior treatment Complete decongestive therapy;Compression garments   Psychosocial concerns Isolation   Living environment House / [facility]   Living environment comments Pt will be moving to [place] in September   General observations Pt moving slowly, limited by neck pain   Fall Risk Screen   Fall screen completed by OT   Have you fallen 2 or more times in the past year? No   Have you fallen and had an injury in the past year? No   Is patient a fall risk? Yes   Fall screen comments Pt reports his balance is off and he feels wobbly.  Pt reports furniture walking at home.  Pt defers PT for now as he is moving soon.   Abuse Screen (yes response referral indicated)   Feels Unsafe at Home or Work/School no   Feels Threatened by Someone no   Does Anyone Try to Keep You From Having Contact with Others or Doing Things Outside Your Home? no   Physical Signs of Abuse Present no  "  System Outcome Measures   Outcome Measures Lymphedema   Lymphedema Life Impact Scale (score range 0-72). A higher score indicates greater impairment. 11   Subjective Report   Patient report of symptoms sensitive to touch   Patient / Family Goals   Patient / family goals statement \"get the manual mobilization.  I don't want to do wraps again\"   Pain   Patient currently in pain Yes   Pain comments Pt with chronic pain in back, neck   Cognitive Status   Orientation Orientation to person, place and time   Level of consciousness Alert   Edema Exam / Assessment   Skin condition Non-pitting;Venous distention;Dryness;Hemosiderin deposits   Skin condition comments Pt with reddish coloring on B lower legs, stasis dermatitis which is improving with topical medication.  Skin is diffusely dry.  Healed ulcers on B LE fully closed   Scar No   Capillary refill Symmetrical   Dorsal pedal pulse Symmetrical   Stemmer sign Positive   Ulceration No   Girth Measurements   Girth Measurements Refer to separate girth measurement flowsheet   Volume LE   Right LE (mL) 3025.9   Left LE (mL) 3030.02  (per pt L is usually bigger)   Range of Motion   ROM comments impaired reach to feet 2/2 pain   Strength   Strength comments generalized deconditioning   Activities of Daily Living   Activities of Daily Living Pt uses donning aid for compression stockings   Bed Mobility   Bed mobility mod I   Transfers   Transfers mod I   Gait / Locomotion   Gait / Locomotion mod I   Sensory   Sensory perception comments intermittent numbness   Vascular Assessment   Vascular Assessment Rubor of Dependency;Vascular concerns   Planned Edema Interventions   Planned edema interventions Manual lymph drainage;Exercises;Precautions to prevent infection / exacerbation;Home management program development   Clinical Impression   Criteria for skilled therapeutic intervention met Yes   Therapy diagnosis lymphedema   Influenced by the following impairments / conditions " Phlebolymphedema;Stage 2   Assessment of Occupational Performance 1-3 Performance Deficits   Identified Performance Deficits infection risk   Clinical Decision Making (Complexity) Low complexity   Treatment Frequency 1x/week   Treatment duration 5 weeks   Patient / family and/or staff in agreement with plan of care Yes   Risks and benefits of therapy have been explained Yes   Clinical impression comments Pt presents with chronic lymphedema which typically responds well to compression stockings.  Pt does have intermittent flaring and will benefit from short round of skilled therapy in order to develop home program for long term management   Education Assessment   Preferred learning style Reading;Listening;Pictures / video;Demonstration   Barriers to learning No barriers   Goals   Edema Eval Goals 1;2;3   Goal 1   Goal identifier Home Management   Goal description In order to reduce risk of infection and promote ind with long term management of lymphedema, pt will verbalize understanding of skin care routine, precautions/contraindications, and when to seek further treatment following education.   Target date 10/01/21   Goal 2   Goal identifier HEP   Goal description In order to promote fluid mobilization for increased ease of functional mobility and decreased infection risk, pt will demonstrate independence with self-MLD and/or exercises to facilitate the lymphatic system.   Target date 10/01/21   Goal 3   Goal identifier Garments   Goal description  In order to promote fluid mobilization for increased ease of functional mobility and decreased infection risk, pt/cg will demonstrate independence with donning, doffing, and care of compression garments following education.   Target date 10/01/21   Total Evaluation Time   OT Eval, Low Complexity Minutes (53421) 15   Certification   Certification date from 07/14/21   Certification date to 10/12/21   Medical Diagnosis lymphedema

## 2021-07-14 NOTE — PROGRESS NOTES
[facility] [place]        OUTPATIENT OCCUPATIONAL THERAPY EDEMA EVALUATION  PLAN OF TREATMENT FOR OUTPATIENT REHABILITATION  (COMPLETE FOR INITIAL CLAIMS ONLY)  Patient's Last Name, First Name, Parmjit Hartley                           Provider s Name:   [facility] [place] Medical Record No.  [id number]     Start of Care Date:  07/14/21   Onset Date:  04/29/21   Type:  OT   Medical Diagnosis:  lymphedema   Therapy Diagnosis:  lymphedema Visits from SOC:  1                                     __________________________________________________________________________________   Plan of Treatment/Functional Goals:    Manual lymph drainage, Exercises, Precautions to prevent infection / exacerbation, Home management program development        GOALS  1. Goal description: In order to reduce risk of infection and promote ind with long term management of lymphedema, pt will verbalize understanding of skin care routine, precautions/contraindications, and when to seek further treatment following education.       Target date: 10/01/21  2. Goal description: In order to promote fluid mobilization for increased ease of functional mobility and decreased infection risk, pt will demonstrate independence with self-MLD and/or exercises to facilitate the lymphatic system.       Target date: 10/01/21  3. Goal description:  In order to promote fluid mobilization for increased ease of functional mobility and decreased infection risk, pt/cg will demonstrate independence with donning, doffing, and care of compression garments following education.       Target date: 10/01/21              Treatment Frequency: 1x/week   Treatment duration: 5 weeks    Neeta Pisano MS, OTR/L, CLT                                    I CERTIFY THE NEED FOR THESE SERVICES FURNISHED UNDER        THIS PLAN OF TREATMENT AND  WHILE UNDER MY CARE     (Physician co-signature of this document indicates review and certification of the therapy plan).                   Certification date from: 07/14/21       Certification date to: 10/12/21           Referring physician: Rodrick Ferrer MD   Initial Assessment  See Epic Evaluation- Start of care: 07/14/21

## 2021-07-15 ENCOUNTER — TRANSFERRED RECORDS (OUTPATIENT)
Dept: HEALTH INFORMATION MANAGEMENT | Facility: CLINIC | Age: 66
End: 2021-07-15

## 2021-07-15 NOTE — PROGRESS NOTES
"SUBJECTIVE:   Parmjit Hernández is a 65 year old male who presents for Preventive Visit.    Patient has been advised of split billing requirements and indicates understanding: Yes   Are you in the first 12 months of your Medicare coverage?  No    Healthy Habits:     In general, how would you rate your overall health?  Poor    Frequency of exercise:  None    Do you usually eat at least 4 servings of fruit and vegetables a day, include whole grains    & fiber and avoid regularly eating high fat or \"junk\" foods?  No    Taking medications regularly:  Yes    Medication side effects:  Not applicable and None    Ability to successfully perform activities of daily living:  No assistance needed    Home Safety:  Lack of grab bars in the bathroom    Hearing Impairment:  Difficulty following a conversation in a noisy restaurant or crowded room    In the past 6 months, have you been bothered by leaking of urine?  No    In general, how would you rate your overall mental or emotional health?  Fair      PHQ-2 Total Score: 2    Additional concerns today:  Yes    The patient would like to discuss his new diet. The patient prepaid for 97 meals (mostly healthy choice packaged meals).  He also has weekly coaching sessions.  Also, Started seeing a functional medicine specialist (Dr. Stew Cassidy )in [place], [place]. He was advised to increase his salt intake and start iron supplement. The patient had chronic fatigue for more than 20 yrs. Trying to find an answer.  The patient had multiple tests and his most recent hemoglobin A1c was 6.0%.    The patient had an episode of shortness of breath during a visit with  provided by his insurance company.  It was recommended that he gets a cane to avoid any falls.  Also recommended discussing his shortness of breath with a provider today.    Also had blurry vision yesterday. He was seen by an ophthalmologist and was diagnosed with early glaucoma.     He had a rash on his leg venous ulcer " and was seen by [facility]. He was advised to do Heba-Clens,timalol drops and Vaseline with coban dressing. Symptoms/lesions resolved.     Also had stasis dermatitis. Currently using kenalog cream.     Do you feel safe in your environment? Yes    Have you ever done Advance Care Planning? (For example, a Health Directive, POLST, or a discussion with a medical provider or your loved ones about your wishes): Yes, patient states has an Advance Care Planning document and will bring a copy to the clinic.     Fall risk  Fallen 2 or more times in the past year?: No  Any fall with injury in the past year?: NoCognitive Screening   1) Repeat 3 items (Leader, Season, Table)    2) Clock draw: NORMAL  3) 3 item recall: Recalls 3 objects  Results: 3 items recalled: COGNITIVE IMPAIRMENT LESS LIKELY    Mini-CogTM Copyright S Shraddha. Licensed by the author for use in [facility]; reprinted with permission (jennyfer@[facility]). All rights reserved.      Do you have sleep apnea, excessive snoring or daytime drowsiness?: yes    Reviewed and updated as needed this visit by clinical staff  Tobacco  Allergies  Meds   Med Hx  Surg Hx  Fam Hx  Soc Hx        Reviewed and updated as needed this visit by Provider                Social History     Tobacco Use     Smoking status: Never Smoker     Smokeless tobacco: Never Used   Substance Use Topics     Alcohol use: Not Currently     Alcohol/week: 0.0 standard drinks     If you drink alcohol do you typically have >3 drinks per day or >7 drinks per week? No    Alcohol Use 7/16/2021   Prescreen: >3 drinks/day or >7 drinks/week? Not Applicable   Prescreen: >3 drinks/day or >7 drinks/week? -     Current providers sharing in care for this patient include:   Patient Care Team:  Vince Henry MD as PCP - General (Family Practice)  Kuldeep Borrero MD as MD (Gastroenterology)  Vince Henry MD as Assigned PCP  Minda Smith RP as Pharmacist  "(Pharmacist)  Ang Rodrigues MD as Assigned Sleep Provider  Grace Zepeda MD as Assigned Cancer Care Provider  Debra Stoddard MD as Assigned Heart and Vascular Provider    The following health maintenance items are reviewed in Epic and correct as of today:  Health Maintenance Due   Topic Date Due     COLORECTAL CANCER SCREENING  08/01/2021     Lab work is in process  Any new diagnosis of family breast, ovarian, or bowel cancer? No      Review of Systems   Constitutional: Negative for chills and fever.   HENT: Negative for congestion, ear pain, hearing loss and sore throat.    Eyes: Negative for pain and visual disturbance.   Respiratory: Positive for shortness of breath. Negative for cough.    Cardiovascular: Negative for chest pain, palpitations and peripheral edema.   Gastrointestinal: Negative for abdominal pain, constipation, diarrhea, heartburn, hematochezia and nausea.   Genitourinary: Positive for impotence. Negative for discharge, dysuria, frequency, genital sores, hematuria and urgency.   Musculoskeletal: Positive for arthralgias and myalgias. Negative for joint swelling.   Skin: Negative for rash.   Neurological: Negative for dizziness, weakness, headaches and paresthesias.   Psychiatric/Behavioral: Positive for mood changes. The patient is nervous/anxious.        OBJECTIVE:   /77 (BP Location: Left arm, Cuff Size: Adult Large)   Pulse 70   Temp 97.4  F (36.3  C) (Oral)   Ht 1.702 m (5' 7\")   Wt 111.9 kg (246 lb 9.6 oz)   SpO2 94%   BMI 38.62 kg/m   Estimated body mass index is 38.62 kg/m  as calculated from the following:    Height as of this encounter: 1.702 m (5' 7\").    Weight as of this encounter: 111.9 kg (246 lb 9.6 oz).  Physical Exam  GENERAL: healthy, alert and no distress, morbid obesity.  EYES: Eyes grossly normal to inspection, PERRL and conjunctivae and sclerae normal  HENT: ear canals and TM's normal, nose and mouth without ulcers or lesions  NECK: no adenopathy, no " asymmetry, masses, or scars and thyroid normal to palpation  RESP: lungs clear to auscultation - no rales, rhonchi or wheezes  CV: regular rate and rhythm, normal S1 S2, no S3 or S4, no murmur, click or rub, no peripheral edema and peripheral pulses strong  ABDOMEN: soft, nontender, no hepatosplenomegaly, no masses and bowel sounds normal  RECTAL: normal sphincter tone, no rectal masses, prostate normal size, smooth, nontender without nodules or masses  MS: no gross musculoskeletal defects noted, no edema  SKIN: no suspicious lesions or rashes  NEURO: Normal strength and tone, mentation intact and speech normal  PSYCH: mentation appears normal, affect normal/bright    Diagnostic Test Results:  Labs reviewed in Epic  No results found. However, due to the size of the patient record, not all encounters were searched. Please check Results Review for a complete set of results.    ASSESSMENT / PLAN:       ICD-10-CM    1. Encounter for Medicare annual wellness exam  Z00.00    2. Encounter for screening for malignant neoplasm of colon  Z12.11 Adult Gastro Ref - Procedure Only   3. At low risk for fall  Z91.81 Cane Order for DME - ONLY FOR DME   4. Hyperlipidemia LDL goal <100  E78.5 Lipid panel reflex to direct LDL Fasting   5. Need for vaccination  Z23 CANCELED: SHINGRIX [[id number]]   6. Dyspnea, unspecified type  R06.00    7. Type 2 diabetes mellitus without complication, without long-term current use of insulin (H)  E11.9      For the dyspnea, the patient has an upcoming appointment with cardiology for a cardiac stress test.    I discontinued his glipizide today because his most recent hemoglobin A1c was 6%.    Patient has been advised of split billing requirements and indicates understanding: Yes  COUNSELING:  Reviewed preventive health counseling, as reflected in patient instructions       Regular exercise       Healthy diet/nutrition    Estimated body mass index is 38.62 kg/m  as calculated from the following:    Height  "as of this encounter: 1.702 m (5' 7\").    Weight as of this encounter: 111.9 kg (246 lb 9.6 oz).    Appropriate preventive services were discussed with this patient, including applicable screening as appropriate for cardiovascular disease, diabetes, osteopenia/osteoporosis, and glaucoma.  As appropriate for age/gender, discussed screening for colorectal cancer, prostate cancer, breast cancer, and cervical cancer. Checklist reviewing preventive services available has been given to the patient.    Reviewed patients plan of care and provided an AVS. The Basic Care Plan (routine screening as documented in Health Maintenance) for Parmjit meets the Care Plan requirement. This Care Plan has been established and reviewed with the Patient.    Counseling Resources:  ATP IV Guidelines  Pooled Cohorts Equation Calculator  Breast Cancer Risk Calculator  Breast Cancer: Medication to Reduce Risk  FRAX Risk Assessment  ICSI Preventive Guidelines  Dietary Guidelines for Americans, 2010  USDA's MyPlate  ASA Prophylaxis  Lung CA Screening    Vince Henry MD  [facility]    Identified Health Risks:  "

## 2021-07-15 NOTE — PATIENT INSTRUCTIONS
Patient Education   Personalized Prevention Plan  You are due for the preventive services outlined below.  Your care team is available to assist you in scheduling these services.  If you have already completed any of these items, please share that information with your care team to update in your medical record.  Health Maintenance Due   Topic Date Due     Zoster (Shingles) Vaccine (2 of 2) 04/22/2020     Colorectal Cancer Screening  08/01/2021

## 2021-07-16 ENCOUNTER — OFFICE VISIT (OUTPATIENT)
Dept: FAMILY MEDICINE | Facility: CLINIC | Age: 66
End: 2021-07-16
Payer: COMMERCIAL

## 2021-07-16 VITALS
BODY MASS INDEX: 38.71 KG/M2 | HEART RATE: 70 BPM | OXYGEN SATURATION: 94 % | SYSTOLIC BLOOD PRESSURE: 128 MMHG | HEIGHT: 67 IN | TEMPERATURE: 97.4 F | DIASTOLIC BLOOD PRESSURE: 77 MMHG | WEIGHT: 246.6 LBS

## 2021-07-16 DIAGNOSIS — R06.00 DYSPNEA, UNSPECIFIED TYPE: ICD-10-CM

## 2021-07-16 DIAGNOSIS — Z12.11 ENCOUNTER FOR SCREENING FOR MALIGNANT NEOPLASM OF COLON: ICD-10-CM

## 2021-07-16 DIAGNOSIS — E11.9 TYPE 2 DIABETES MELLITUS WITHOUT COMPLICATION, WITHOUT LONG-TERM CURRENT USE OF INSULIN (H): ICD-10-CM

## 2021-07-16 DIAGNOSIS — Z00.00 ENCOUNTER FOR MEDICARE ANNUAL WELLNESS EXAM: Primary | ICD-10-CM

## 2021-07-16 DIAGNOSIS — Z91.81 AT LOW RISK FOR FALL: ICD-10-CM

## 2021-07-16 DIAGNOSIS — Z23 NEED FOR VACCINATION: ICD-10-CM

## 2021-07-16 DIAGNOSIS — E78.5 HYPERLIPIDEMIA LDL GOAL <100: ICD-10-CM

## 2021-07-16 PROCEDURE — 90471 IMMUNIZATION ADMIN: CPT | Performed by: FAMILY MEDICINE

## 2021-07-16 PROCEDURE — 90750 HZV VACC RECOMBINANT IM: CPT | Performed by: FAMILY MEDICINE

## 2021-07-16 PROCEDURE — 99396 PREV VISIT EST AGE 40-64: CPT | Mod: 25 | Performed by: FAMILY MEDICINE

## 2021-07-16 PROCEDURE — 99214 OFFICE O/P EST MOD 30 MIN: CPT | Mod: 25 | Performed by: FAMILY MEDICINE

## 2021-07-16 ASSESSMENT — ENCOUNTER SYMPTOMS
DIARRHEA: 0
HEADACHES: 0
CHILLS: 0
CONSTIPATION: 0
JOINT SWELLING: 0
DYSURIA: 0
FREQUENCY: 0
MYALGIAS: 1
NERVOUS/ANXIOUS: 1
ARTHRALGIAS: 1
COUGH: 0
FEVER: 0
PALPITATIONS: 0
NAUSEA: 0
EYE PAIN: 0
HEARTBURN: 0
DIZZINESS: 0
HEMATOCHEZIA: 0
ABDOMINAL PAIN: 0
WEAKNESS: 0
HEMATURIA: 0
SORE THROAT: 0
SHORTNESS OF BREATH: 1
PARESTHESIAS: 0

## 2021-07-16 ASSESSMENT — ACTIVITIES OF DAILY LIVING (ADL): CURRENT_FUNCTION: NO ASSISTANCE NEEDED

## 2021-07-16 ASSESSMENT — MIFFLIN-ST. JEOR: SCORE: 1867.2

## 2021-07-16 NOTE — NURSING NOTE
Prior to immunization administration, verified patients identity using patient s name and date of birth. Please see Immunization Activity for additional information.     Screening Questionnaire for Adult Immunization    Are you sick today?   No   Do you have allergies to medications, food, a vaccine component or latex?   Yes   Have you ever had a serious reaction after receiving a vaccination?   No   Do you have a long-term health problem with heart, lung, kidney, or metabolic disease (e.g., diabetes), asthma, a blood disorder, no spleen, complement component deficiency, a cochlear implant, or a spinal fluid leak?  Are you on long-term aspirin therapy?   Yes   Do you have cancer, leukemia, HIV/AIDS, or any other immune system problem?   Yes   Do you have a parent, brother, or sister with an immune system problem?   No   In the past 3 months, have you taken medications that affect  your immune system, such as prednisone, other steroids, or anticancer drugs; drugs for the treatment of rheumatoid arthritis, Crohn s disease, or psoriasis; or have you had radiation treatments?   Yes   Have you had a seizure, or a brain or other nervous system problem?   No   During the past year, have you received a transfusion of blood or blood    products, or been given immune (gamma) globulin or antiviral drug?   No   For women: Are you pregnant or is there a chance you could become       pregnant during the next month?   No   Have you received any vaccinations in the past 4 weeks?   No     Immunization questionnaire was positive for at least one answer.  Notified .        Per orders of Dr. Henry, injection of Shingrix #2 given by Margot Munoz MA. Patient instructed to remain in clinic for 15 minutes afterwards, and to report any adverse reaction to me immediately.  [facility]     Screening performed by Margot Munoz MA on 7/16/2021 at 11:19 AM.

## 2021-07-22 ENCOUNTER — CARE COORDINATION (OUTPATIENT)
Dept: CARDIOLOGY | Facility: CLINIC | Age: 66
End: 2021-07-22

## 2021-07-22 DIAGNOSIS — I21.4 NSTEMI (NON-ST ELEVATED MYOCARDIAL INFARCTION) (H): Primary | ICD-10-CM

## 2021-07-22 NOTE — PROGRESS NOTES
Call from patient regarding Cardiac MRI scheduled for today 7/22/21. Patient unaware that testing would take an upwards to 90 mins. Patient states that he's claustrophobic and unable to be inside MRI machine for that long. Patient wondering if he needs this imaging. Patient also states that MRI department was very behind today, about 2 hours. Patient didn't have  with him to be given premedication. Will route to Dr. Stoddard for review.         Last /21 w/ Dr. Stoddard:  ASSESSMENT AND PLAN:     1.  Recent Non-STEMI, likely demand MI versus cardiomyopathy  - Normal coronary angiography in 2018 at [facility] and normal recent cardiac MRI stress study negative in 2020.        2.  Hypertension  - Readings have been labile at times. Currently well-controlled.   - lisinopril 20 mg daily, metoprolol succinate 50 mg daily, and triamterene-hydrochlorothiazide which was recently added at 37.5 mg-25 mg once daily.      3.  Systemic amyloidosis   - Cardiac MRI with stress 08/18/2020 showing normal LV and RV systolic functions with no evidence of inducible ischemia. Late gadolinium enhancement imaging showed patchy delayed enhancement in the basal septal wall consistent with non CAD, non-specific scar. No evidence of current amyloid was noted, however given elevated troponin and EKG findings of low voltage and incomplete RBBB we may need to repeat to assess for new cardiac involvement.     4.  Hyperlipidemia, treated on atorvastatin 20 mg daily     5. NORMA: on CPAP      Follow up 3 months.    SERGEY Zhu July 22, 2021 11:10 AM

## 2021-07-23 ENCOUNTER — LAB (OUTPATIENT)
Dept: LAB | Facility: CLINIC | Age: 66
End: 2021-07-23
Payer: COMMERCIAL

## 2021-07-23 ENCOUNTER — MYC MEDICAL ADVICE (OUTPATIENT)
Dept: FAMILY MEDICINE | Facility: CLINIC | Age: 66
End: 2021-07-23

## 2021-07-23 DIAGNOSIS — E78.5 HYPERLIPIDEMIA LDL GOAL <100: ICD-10-CM

## 2021-07-23 DIAGNOSIS — R29.898 BILATERAL LEG WEAKNESS: Primary | ICD-10-CM

## 2021-07-23 LAB
CHOLEST SERPL-MCNC: 112 MG/DL
FASTING STATUS PATIENT QL REPORTED: YES
HDLC SERPL-MCNC: 32 MG/DL
LDLC SERPL CALC-MCNC: 53 MG/DL
NONHDLC SERPL-MCNC: 80 MG/DL
TRIGL SERPL-MCNC: 133 MG/DL

## 2021-07-23 PROCEDURE — 80061 LIPID PANEL: CPT

## 2021-07-23 PROCEDURE — 36415 COLL VENOUS BLD VENIPUNCTURE: CPT

## 2021-07-24 ENCOUNTER — MYC MEDICAL ADVICE (OUTPATIENT)
Dept: FAMILY MEDICINE | Facility: CLINIC | Age: 66
End: 2021-07-24

## 2021-07-26 NOTE — PROGRESS NOTES
Debra Stoddard MD  You 52 minutes ago (12:28 PM)   CF  Okay no problem. Please apologize for me for the mix up. We can check a repeat echocardiogram in 3 months with troponin lab and EKG instead of the MRI. If those are abnormal we can discuss further in follow up.      Dr. Stoddard    Message text      Reviewed updated plan with patient. Patient agrees with plan, and would like to have testing done at the end of August before he moves to [place]. Orders placed for echocardiogram, EKG, and troponin. Message sent to scheduling to set patient up for testing in late August.

## 2021-07-26 NOTE — RESULT ENCOUNTER NOTE
Dear Erickson,   Here are your recent results which are within the expected range except for a mild decline in your HDL level. I would recommend increasing your physical activity to bring it up to the normal range.     Best regards     Vince Henry MD

## 2021-07-30 ENCOUNTER — TRANSFERRED RECORDS (OUTPATIENT)
Dept: HEALTH INFORMATION MANAGEMENT | Facility: CLINIC | Age: 66
End: 2021-07-30

## 2021-08-04 ASSESSMENT — SLEEP AND FATIGUE QUESTIONNAIRES
HOW LIKELY ARE YOU TO NOD OFF OR FALL ASLEEP IN A CAR, WHILE STOPPED FOR A FEW MINUTES IN TRAFFIC: MODERATE CHANCE OF DOZING
HOW LIKELY ARE YOU TO NOD OFF OR FALL ASLEEP WHILE SITTING AND READING: HIGH CHANCE OF DOZING
HOW LIKELY ARE YOU TO NOD OFF OR FALL ASLEEP WHEN YOU ARE A PASSENGER IN A CAR FOR AN HOUR WITHOUT A BREAK: HIGH CHANCE OF DOZING
HOW LIKELY ARE YOU TO NOD OFF OR FALL ASLEEP WHILE SITTING QUIETLY AFTER LUNCH WITHOUT ALCOHOL: MODERATE CHANCE OF DOZING
HOW LIKELY ARE YOU TO NOD OFF OR FALL ASLEEP WHILE WATCHING TV: HIGH CHANCE OF DOZING
HOW LIKELY ARE YOU TO NOD OFF OR FALL ASLEEP WHILE LYING DOWN TO REST IN THE AFTERNOON WHEN CIRCUMSTANCES PERMIT: HIGH CHANCE OF DOZING
HOW LIKELY ARE YOU TO NOD OFF OR FALL ASLEEP WHILE SITTING INACTIVE IN A PUBLIC PLACE: HIGH CHANCE OF DOZING
HOW LIKELY ARE YOU TO NOD OFF OR FALL ASLEEP WHILE SITTING AND TALKING TO SOMEONE: MODERATE CHANCE OF DOZING

## 2021-08-05 ENCOUNTER — TRANSFERRED RECORDS (OUTPATIENT)
Dept: HEALTH INFORMATION MANAGEMENT | Facility: CLINIC | Age: 66
End: 2021-08-05

## 2021-08-06 ENCOUNTER — TELEPHONE (OUTPATIENT)
Dept: SLEEP MEDICINE | Facility: CLINIC | Age: 66
End: 2021-08-06

## 2021-08-06 ENCOUNTER — VIRTUAL VISIT (OUTPATIENT)
Dept: SLEEP MEDICINE | Facility: CLINIC | Age: 66
End: 2021-08-06
Payer: COMMERCIAL

## 2021-08-06 DIAGNOSIS — G47.33 OSA (OBSTRUCTIVE SLEEP APNEA): Primary | ICD-10-CM

## 2021-08-06 PROCEDURE — 99214 OFFICE O/P EST MOD 30 MIN: CPT | Mod: 95 | Performed by: INTERNAL MEDICINE

## 2021-08-06 RX ORDER — SOLRIAMFETOL 75 MG/1
75 TABLET, FILM COATED ORAL DAILY
Qty: 60 TABLET | Refills: 1 | Status: SHIPPED | OUTPATIENT
Start: 2021-08-06 | End: 2023-02-23

## 2021-08-06 NOTE — TELEPHONE ENCOUNTER
Central Prior Authorization Team   Phone: [phone]      PA Initiation    Medication: Sunosi 75mg tabs,   Insurance Company: [employer] ([facility]) - Phone [phone] Fax [phone]  Pharmacy Filling the Rx: [id number] [place] [id number] & [facility]  Filling Pharmacy Phone: [phone]  Filling Pharmacy Fax:    Start Date: 8/6/2021

## 2021-08-06 NOTE — TELEPHONE ENCOUNTER
Prior Authorization Specialty Medication Request    Medication/Dose: rec'd faxed prior auth request Ayan Hendricks  Sunosi 75mg tabs,     Ayan initiated PA [employer],   KEY:  [id number]      ICD code (if different than what is on RX):    Previously Tried and Failed:   Important Lab Values:   Rationale:     Insurance Name:[employer]ARE [employer]VANTAGE     Insurance ID:[id number]       Insurance Phone Number:   Pharmacy Information (if different than what is on RX)  Name:  [facility] [place]  Phone: [phone]    ROUTED TO PA MEDICATION TEAM, DR. MARROQUIN FOR REVIEW.

## 2021-08-06 NOTE — TELEPHONE ENCOUNTER
PRIOR AUTHORIZATION DENIED    Medication: Sunosi 75mg tabs,     Denial Date: 8/6/2021    Denial Rational: Patient has tried one of two required alternatives, the Modafinil. Still needs to try Armodafinil.          Appeal Information:

## 2021-08-06 NOTE — PROGRESS NOTES
Erickson is a 66 year old who is being evaluated via a billable video visit.      Video Start Time: 11:39 AM  Video-Visit Details    Type of service:  Video Visit    Video End Time:12:00 PM    Originating Location (pt. Location): Home    Distant Location (provider location):  [facility]     Platform used for Video Visit: [employer]    Presenting History:     Parmjit Hernández is a 66 years old male who has severe obstructive sleep apnea and is prescribed CPAP therapy.     PSG on 5/28/2008 showed an AHI of 98.3 per hour and O2 saturation sawyer of 76%.      Medical history is significant for HTN, DM 2, bipolar I disorder, anxiety, OCD, amyloidosis and obesity.     Patient has a delayed sleep phase circadian rhythm, which we have previously reviewed.     Although he has demonstrated regular compliance with CPAP before, compliance has been poor over last one year. He reports that a change in mask will improve compliance. His CPAP device is part of a  recall and he has stopped using the affected device. Today, we discussed details of the recall and risks. Alternate therapy options were reviewed but considering severe disease and previous good response to therapy, he plans to continue PAP therapy I will order a replacement device for him.     He has been prescribed Modafinil 300 mg by his Psychiatrist for management of sleepiness. He continues to experience residual sleepiness and wants to switch to Solriamfetol for his sleepiness.     Respironics (6/3/21- 7/2/21)    Auto-PAP 17-20 cmH2O download:  27/30  total days of use. 3 nonuse days.  Average use 2 hours 24 minutes per day.  6.7% days with >4 hours use.  Large leak 41 mins per day average. CPAP 90% pressure 18.4cm. AHI 7.0        Impression & Plan:     1. Severe Obstructive sleep apnea  2. Hypersomnia    - Continue CPAP therapy with a replacement device   - Solriamfetol for treatment of hypersomnia associated with NORMA. Will start at  37.5 mg daily with dose titrated up to 150 mg   - Follow up in 2-3 months     I spent a total of 30 minutes for this appointment today which include time spent before, during and after the visit for patient care, counseling and coordination of care.    Dr. Ang Rodrigues

## 2021-08-10 ENCOUNTER — TRANSFERRED RECORDS (OUTPATIENT)
Dept: HEALTH INFORMATION MANAGEMENT | Facility: CLINIC | Age: 66
End: 2021-08-10

## 2021-08-17 ENCOUNTER — TELEPHONE (OUTPATIENT)
Dept: SLEEP MEDICINE | Facility: CLINIC | Age: 66
End: 2021-08-17

## 2021-08-17 DIAGNOSIS — G62.9 PERIPHERAL POLYNEUROPATHY: ICD-10-CM

## 2021-08-17 NOTE — TELEPHONE ENCOUNTER
Routing refill request to provider for review/approval because:  Drug not on the [facility] refill protocol   Stacie MAYA RN

## 2021-08-17 NOTE — NURSING NOTE
Attempted;unsuccessful left message to call back to schedule follow up appointment.           BAR Medellin  [facility]

## 2021-08-18 ENCOUNTER — TELEPHONE (OUTPATIENT)
Dept: FAMILY MEDICINE | Facility: CLINIC | Age: 66
End: 2021-08-18

## 2021-08-18 NOTE — TELEPHONE ENCOUNTER
Reason for Call:  Other call back and opinion on getting the Booster Covid shot    Detailed comments: patient is pretty sure he qualifys for the Booster Covid but would like His Providers Opinion    Phone Number Patient can be reached at: Home number on file [phone] (home)    Best Time: when available    Can we leave a detailed message on this number? YES    Call taken on 8/18/2021 at 5:10 PM by Genie Laureano

## 2021-08-19 RX ORDER — PREGABALIN 100 MG/1
100 CAPSULE ORAL 4 TIMES DAILY
Qty: 360 CAPSULE | Refills: 0 | Status: SHIPPED | OUTPATIENT
Start: 2021-08-19 | End: 2023-03-10

## 2021-08-19 NOTE — TELEPHONE ENCOUNTER
Discussed with pt   H/O bone marrow transplant so he should qualify   Informed him [facility] not doing this right   Pt informed he'd qualify  States [facility] giving them   He will get a booster    Has seen Dr Campbell   Had uro-exams and tests  Sometimes just can't pee   Has appt with [facility] today  Wants to know what she can do   Told him if already seeing specialist he should call urology to f/u with them  Stacie MAYA RN

## 2021-08-23 ENCOUNTER — HOSPITAL ENCOUNTER (OUTPATIENT)
Dept: CARDIOLOGY | Facility: CLINIC | Age: 66
Discharge: HOME | End: 2021-08-23
Attending: INTERNAL MEDICINE | Admitting: INTERNAL MEDICINE
Payer: COMMERCIAL

## 2021-08-23 ENCOUNTER — MYC MEDICAL ADVICE (OUTPATIENT)
Dept: FAMILY MEDICINE | Facility: CLINIC | Age: 66
End: 2021-08-23

## 2021-08-23 ENCOUNTER — LAB (OUTPATIENT)
Dept: LAB | Facility: CLINIC | Age: 66
End: 2021-08-23
Payer: COMMERCIAL

## 2021-08-23 DIAGNOSIS — I21.4 NSTEMI (NON-ST ELEVATED MYOCARDIAL INFARCTION) (H): ICD-10-CM

## 2021-08-23 DIAGNOSIS — R07.9 CHEST PAIN, UNSPECIFIED TYPE: Primary | ICD-10-CM

## 2021-08-23 LAB
LVEF ECHO: NORMAL
TROPONIN I SERPL-MCNC: <0.015 UG/L (ref 0–0.04)

## 2021-08-23 PROCEDURE — 36415 COLL VENOUS BLD VENIPUNCTURE: CPT | Performed by: INTERNAL MEDICINE

## 2021-08-23 PROCEDURE — 84484 ASSAY OF TROPONIN QUANT: CPT | Performed by: INTERNAL MEDICINE

## 2021-08-23 PROCEDURE — 255N000002 HC RX 255 OP 636: Performed by: INTERNAL MEDICINE

## 2021-08-23 PROCEDURE — 93306 TTE W/DOPPLER COMPLETE: CPT | Mod: 26 | Performed by: INTERNAL MEDICINE

## 2021-08-23 PROCEDURE — C8929 TTE W OR WO FOL WCON,DOPPLER: HCPCS

## 2021-08-23 PROCEDURE — 999N000208 ECHOCARDIOGRAM COMPLETE

## 2021-08-23 RX ADMIN — HUMAN ALBUMIN MICROSPHERES AND PERFLUTREN 3 ML: 10; .22 INJECTION, SOLUTION INTRAVENOUS at 13:30

## 2021-08-23 NOTE — TELEPHONE ENCOUNTER
FS,    Please see [employer].  Do you want to do virtual visit? Evisit?    Wandy Pena RN

## 2021-08-30 DIAGNOSIS — I10 HYPERTENSION GOAL BP (BLOOD PRESSURE) < 140/90: ICD-10-CM

## 2021-08-30 RX ORDER — HYDROCHLOROTHIAZIDE 25 MG/1
37.5 TABLET ORAL DAILY
Qty: 135 TABLET | Refills: 2 | Status: SHIPPED | OUTPATIENT
Start: 2021-08-30 | End: 2022-06-09

## 2021-08-30 NOTE — PROGRESS NOTES
RN updated patient via [employer].       Dr. Stoddard's recommendation.      Thanks! Troponin is negative and echocardiogram does not show significant increase in wall thickness which is good news and what I am looking for on imaging to suggest a certain type of cardiomyopathy. I think for now given claustrophobia we can do an echo surveillance plan instead of MRI moving forward. Can discuss at [facility] for sure.     Let me know if any questions, thanks!     Dr. Stoddard

## 2021-08-30 NOTE — PROGRESS NOTES
VM received from patient, requesting interpretation of his recent echo results:    Interpretation Summary     Patient is hypertensive.  Left ventricular systolic function is normal. Mild LVH.  The right ventricle is normal in size and function.  The left atrium is mildly dilated.  The ascending aorta is Borderline dilated at 3.9 cms.  No significant valve disease.    Will route to Dr. Stoddard for review, given patient was hypertensive during study, to see if any changes and/or recommendations are needed.

## 2021-09-29 ENCOUNTER — MYC MEDICAL ADVICE (OUTPATIENT)
Dept: FAMILY MEDICINE | Facility: CLINIC | Age: 66
End: 2021-09-29

## 2021-10-13 ENCOUNTER — NURSE TRIAGE (OUTPATIENT)
Dept: NURSING | Facility: CLINIC | Age: 66
End: 2021-10-13

## 2021-10-13 NOTE — TELEPHONE ENCOUNTER
RN triage   Call from [facility] provider office / Ale Auguste ---  looking for medical record from 15 yrs ago --- transferred to medical records       Chinyere Tatum RN  [facility]  Triage Nurse Advisor      Additional Information    Information only question and nurse able to answer    Protocols used: INFORMATION ONLY CALL - NO TRIAGE-A-OH

## 2021-10-18 ENCOUNTER — TELEPHONE (OUTPATIENT)
Dept: SLEEP MEDICINE | Facility: CLINIC | Age: 66
End: 2021-10-18

## 2021-10-18 NOTE — TELEPHONE ENCOUNTER
Reason for call:  Other   Patient called regarding (reason for call): prescription  Additional comments: Please fax presecription for Sunosi to [facility] in [place]  Fax: [phone]  Phone :[phone]    Phone number to reach patient:  Other phone number:  [facility]: [phone]    Best Time:  any    Can we leave a detailed message on this number?  YES    Travel screening: Not Applicable

## 2021-10-23 ENCOUNTER — HEALTH MAINTENANCE LETTER (OUTPATIENT)
Age: 66
End: 2021-10-23

## 2022-02-03 ENCOUNTER — MYC MEDICAL ADVICE (OUTPATIENT)
Dept: FAMILY MEDICINE | Facility: CLINIC | Age: 67
End: 2022-02-03
Payer: COMMERCIAL

## 2022-02-12 ENCOUNTER — HEALTH MAINTENANCE LETTER (OUTPATIENT)
Age: 67
End: 2022-02-12

## 2022-08-15 DIAGNOSIS — E78.5 HYPERLIPIDEMIA LDL GOAL <100: ICD-10-CM

## 2022-08-16 RX ORDER — ATORVASTATIN CALCIUM 20 MG/1
30 TABLET, FILM COATED ORAL DAILY
Qty: 1 TABLET | Refills: 0 | OUTPATIENT
Start: 2022-08-16

## 2022-10-09 ENCOUNTER — HEALTH MAINTENANCE LETTER (OUTPATIENT)
Age: 67
End: 2022-10-09

## 2022-12-28 ENCOUNTER — TELEPHONE (OUTPATIENT)
Dept: FAMILY MEDICINE | Facility: CLINIC | Age: 67
End: 2022-12-28

## 2022-12-28 ENCOUNTER — LAB (OUTPATIENT)
Dept: LAB | Facility: CLINIC | Age: 67
End: 2022-12-28
Payer: COMMERCIAL

## 2022-12-28 DIAGNOSIS — Z20.822 SUSPECTED COVID-19 VIRUS INFECTION: ICD-10-CM

## 2022-12-28 PROCEDURE — U0005 INFEC AGEN DETEC AMPLI PROBE: HCPCS

## 2022-12-28 PROCEDURE — U0003 INFECTIOUS AGENT DETECTION BY NUCLEIC ACID (DNA OR RNA); SEVERE ACUTE RESPIRATORY SYNDROME CORONAVIRUS 2 (SARS-COV-2) (CORONAVIRUS DISEASE [COVID-19]), AMPLIFIED PROBE TECHNIQUE, MAKING USE OF HIGH THROUGHPUT TECHNOLOGIES AS DESCRIBED BY CMS-2020-01-R: HCPCS

## 2022-12-28 NOTE — TELEPHONE ENCOUNTER
Patient called  Requesting oxycodone   Currently lives in [place]  Is in [place] for business trip  Is seen by a pain clinic in [place]  Recommended to call pain clinic for best options  [facility] does not have appointments available today or tomorrow  Transferred to central scheduling per pt preference to check if the system has any provider appointments available  Divina AGRAWAL RN

## 2022-12-29 ENCOUNTER — TELEPHONE (OUTPATIENT)
Dept: NURSING | Facility: CLINIC | Age: 67
End: 2022-12-29

## 2022-12-29 LAB — SARS-COV-2 RNA RESP QL NAA+PROBE: POSITIVE

## 2022-12-29 NOTE — TELEPHONE ENCOUNTER
Coronavirus (COVID-19) Notification    Caller Name (Patient, parent, daughter/son, grandparent, etc)  patient    Reason for call  Notify of Positive Coronavirus (COVID-19) lab results, assess symptoms,  review [facility] recommendations    Lab Result    Lab test:  2019-nCoV rRt-PCR or SARS-CoV-2 PCR    Oropharyngeal AND/OR nasopharyngeal swabs is POSITIVE for 2019-nCoV RNA/SARS-COV-2 PCR (COVID-19 virus)      Gather patient reported symptoms   Assessment   Current Symptoms at time of phone call, reported by patient: (if no symptoms, document: No symptoms] No symptoms   Date of symptom(s) onset (if applicable) No symptoms     If at time of call, Patients symptoms have worsened, the Patient should contact 911 or have someone drive them to Emergency Dept promptly:      If Patient calling 911, inform 911 personal that you have tested positive for the Coronavirus (COVID-19).  Place mask on and await 911 to arrive.    If Emergency Dept, If possible, please have another adult drive you to the Emergency Dept but you need to wear mask when in contact with other people.      Treatment Options:   Patient classified as COVID treatment eligible by Epic high risk algorithm: Yes  Is the patient symptomatic at the time of result notification? No    Review information with Patient    Your result was positive. This means you have COVID-19 (coronavirus).    How can I protect others?    These guidelines are for isolating before returning to work, school or .    If you DO have symptoms    Stay home and away from others     For at least 5 days after your symptoms started, AND    You are fever free for 24 hours (with no medicine that reduces fever), AND    Your other symptoms are better    Wear a mask for 10 full days anytime you are around others    If you DON'T have symptoms    Stay home and away from others for at least 5 days after your positive test    Wear a mask for 10 full days anytime you are around others    There  may be different guidelines for healthcare facilities.  Please check with the specific sites before arriving.    If you have been told by a doctor that you were severely ill with COVID-19 or are immunocompromised, you should isolate for at least 10 days.    You should not go back to work until you meet the guidelines above for ending your home isolation. You don't need to be retested for COVID-19 before going back to work--studies show that you won't spread the virus if it's been at least 10 days since your symptoms started (or 20 days, if you have a weak immune system).    Employers, schools, and daycares: This is an official notice for this person's medical guidelines for returning in-person.  They must meet the above guidelines before going back to work, school or  in person.    You will receive a positive COVID-19 letter via [employer] or the mail soon with additional self-care information.    Would you like me to review some of that information with you now?  No    If you were tested for an upcoming procedure, please contact your provider for next steps.    Trudy Paredes

## 2023-02-02 NOTE — LETTER
[facility]  [address]  [facility] [address]  [phone]        5/25/2017      Re: Parmjit Hernández          TO WHOM IT MAY CONCERN:    Parmjit Hernández  was seen on 5/25/17 and under my medical care.  Please allow him to take 1000 mg tylenol and 800 mg of ibuprofen simultaneously.            Spike Powers MD  [facility]     Quality 110: Preventive Care And Screening: Influenza Immunization: Influenza Immunization previously received during influenza season Detail Level: Detailed

## 2023-02-23 ENCOUNTER — PATIENT OUTREACH (OUTPATIENT)
Dept: ONCOLOGY | Facility: CLINIC | Age: 68
End: 2023-02-23

## 2023-02-23 ENCOUNTER — OFFICE VISIT (OUTPATIENT)
Dept: FAMILY MEDICINE | Facility: CLINIC | Age: 68
End: 2023-02-23

## 2023-02-23 ENCOUNTER — TELEPHONE (OUTPATIENT)
Dept: OTHER | Facility: CLINIC | Age: 68
End: 2023-02-23

## 2023-02-23 VITALS
WEIGHT: 247.5 LBS | BODY MASS INDEX: 39.77 KG/M2 | HEART RATE: 83 BPM | TEMPERATURE: 97.3 F | OXYGEN SATURATION: 97 % | DIASTOLIC BLOOD PRESSURE: 100 MMHG | HEIGHT: 66 IN | SYSTOLIC BLOOD PRESSURE: 164 MMHG | RESPIRATION RATE: 18 BRPM

## 2023-02-23 DIAGNOSIS — E66.01 MORBID OBESITY (H): ICD-10-CM

## 2023-02-23 DIAGNOSIS — D69.6 THROMBOCYTOPENIA, UNSPECIFIED (H): ICD-10-CM

## 2023-02-23 DIAGNOSIS — I73.9 PERIPHERAL VASCULAR DISEASE (H): ICD-10-CM

## 2023-02-23 DIAGNOSIS — E78.5 HYPERLIPIDEMIA LDL GOAL <70: ICD-10-CM

## 2023-02-23 DIAGNOSIS — G62.9 PERIPHERAL POLYNEUROPATHY: ICD-10-CM

## 2023-02-23 DIAGNOSIS — Z94.84 HISTORY OF PERIPHERAL STEM CELL TRANSPLANT (H): ICD-10-CM

## 2023-02-23 DIAGNOSIS — G89.4 CHRONIC PAIN SYNDROME: ICD-10-CM

## 2023-02-23 DIAGNOSIS — L84 CALLUS OF FOOT: ICD-10-CM

## 2023-02-23 DIAGNOSIS — Z00.00 ENCOUNTER FOR MEDICARE ANNUAL WELLNESS EXAM: Primary | ICD-10-CM

## 2023-02-23 DIAGNOSIS — K74.60 CIRRHOSIS OF LIVER WITHOUT ASCITES, UNSPECIFIED HEPATIC CIRRHOSIS TYPE (H): ICD-10-CM

## 2023-02-23 DIAGNOSIS — E29.1 HYPOGONADISM MALE: ICD-10-CM

## 2023-02-23 DIAGNOSIS — G43.919 INTRACTABLE MIGRAINE WITHOUT STATUS MIGRAINOSUS, UNSPECIFIED MIGRAINE TYPE: ICD-10-CM

## 2023-02-23 DIAGNOSIS — G47.33 OBSTRUCTIVE SLEEP APNEA SYNDROME: ICD-10-CM

## 2023-02-23 DIAGNOSIS — E85.89 OTHER AMYLOIDOSIS (H): ICD-10-CM

## 2023-02-23 DIAGNOSIS — I21.4 NSTEMI (NON-ST ELEVATED MYOCARDIAL INFARCTION) (H): ICD-10-CM

## 2023-02-23 DIAGNOSIS — M20.42 HAMMERTOE OF LEFT FOOT: ICD-10-CM

## 2023-02-23 DIAGNOSIS — F31.9 BIPOLAR I DISORDER (H): ICD-10-CM

## 2023-02-23 DIAGNOSIS — I10 HYPERTENSION GOAL BP (BLOOD PRESSURE) < 140/90: ICD-10-CM

## 2023-02-23 DIAGNOSIS — E11.59 TYPE 2 DIABETES MELLITUS WITH OTHER CIRCULATORY COMPLICATION, WITHOUT LONG-TERM CURRENT USE OF INSULIN (H): ICD-10-CM

## 2023-02-23 PROBLEM — N17.9 ACUTE KIDNEY INJURY (H): Status: RESOLVED | Noted: 2020-12-03 | Resolved: 2023-02-23

## 2023-02-23 LAB
ALBUMIN SERPL BCG-MCNC: 4.2 G/DL (ref 3.5–5.2)
ALP SERPL-CCNC: 115 U/L (ref 40–129)
ALT SERPL W P-5'-P-CCNC: 51 U/L (ref 10–50)
ANION GAP SERPL CALCULATED.3IONS-SCNC: 11 MMOL/L (ref 7–15)
AST SERPL W P-5'-P-CCNC: 29 U/L (ref 10–50)
BILIRUB SERPL-MCNC: 0.5 MG/DL
BUN SERPL-MCNC: 18.9 MG/DL (ref 8–23)
CALCIUM SERPL-MCNC: 9 MG/DL (ref 8.8–10.2)
CHLORIDE SERPL-SCNC: 100 MMOL/L (ref 98–107)
CHOLEST SERPL-MCNC: 153 MG/DL
CREAT SERPL-MCNC: 0.83 MG/DL (ref 0.67–1.17)
CREAT UR-MCNC: 39.7 MG/DL
DEPRECATED HCO3 PLAS-SCNC: 24 MMOL/L (ref 22–29)
ERYTHROCYTE [DISTWIDTH] IN BLOOD BY AUTOMATED COUNT: 14.4 % (ref 10–15)
GFR SERPL CREATININE-BSD FRML MDRD: >90 ML/MIN/1.73M2
GLUCOSE SERPL-MCNC: 305 MG/DL (ref 70–99)
HBA1C MFR BLD: 9.3 % (ref 0–5.6)
HCT VFR BLD AUTO: 40 % (ref 40–53)
HDLC SERPL-MCNC: 35 MG/DL
HGB BLD-MCNC: 14.3 G/DL (ref 13.3–17.7)
HOLD SPECIMEN: NORMAL
LDLC SERPL CALC-MCNC: 79 MG/DL
MCH RBC QN AUTO: 33.7 PG (ref 26.5–33)
MCHC RBC AUTO-ENTMCNC: 35.8 G/DL (ref 31.5–36.5)
MCV RBC AUTO: 94 FL (ref 78–100)
MICROALBUMIN UR-MCNC: 106 MG/L
MICROALBUMIN/CREAT UR: 267 MG/G CR (ref 0–17)
NONHDLC SERPL-MCNC: 118 MG/DL
PLATELET # BLD AUTO: 151 10E3/UL (ref 150–450)
POTASSIUM SERPL-SCNC: 4.1 MMOL/L (ref 3.4–5.3)
PROT SERPL-MCNC: 6.2 G/DL (ref 6.4–8.3)
RBC # BLD AUTO: 4.24 10E6/UL (ref 4.4–5.9)
SODIUM SERPL-SCNC: 135 MMOL/L (ref 136–145)
TRIGL SERPL-MCNC: 197 MG/DL
WBC # BLD AUTO: 6.9 10E3/UL (ref 4–11)

## 2023-02-23 PROCEDURE — 36415 COLL VENOUS BLD VENIPUNCTURE: CPT | Performed by: FAMILY MEDICINE

## 2023-02-23 PROCEDURE — 99214 OFFICE O/P EST MOD 30 MIN: CPT | Mod: 25 | Performed by: FAMILY MEDICINE

## 2023-02-23 PROCEDURE — 84403 ASSAY OF TOTAL TESTOSTERONE: CPT | Performed by: FAMILY MEDICINE

## 2023-02-23 PROCEDURE — 80053 COMPREHEN METABOLIC PANEL: CPT | Performed by: FAMILY MEDICINE

## 2023-02-23 PROCEDURE — 82570 ASSAY OF URINE CREATININE: CPT | Performed by: FAMILY MEDICINE

## 2023-02-23 PROCEDURE — G0009 ADMIN PNEUMOCOCCAL VACCINE: HCPCS | Performed by: FAMILY MEDICINE

## 2023-02-23 PROCEDURE — 99397 PER PM REEVAL EST PAT 65+ YR: CPT | Mod: 25 | Performed by: FAMILY MEDICINE

## 2023-02-23 PROCEDURE — 85027 COMPLETE CBC AUTOMATED: CPT | Performed by: FAMILY MEDICINE

## 2023-02-23 PROCEDURE — 80061 LIPID PANEL: CPT | Performed by: FAMILY MEDICINE

## 2023-02-23 PROCEDURE — 83036 HEMOGLOBIN GLYCOSYLATED A1C: CPT | Performed by: FAMILY MEDICINE

## 2023-02-23 PROCEDURE — 90677 PCV20 VACCINE IM: CPT | Performed by: FAMILY MEDICINE

## 2023-02-23 PROCEDURE — 82043 UR ALBUMIN QUANTITATIVE: CPT | Performed by: FAMILY MEDICINE

## 2023-02-23 RX ORDER — MODAFINIL 200 MG/1
300 TABLET ORAL DAILY
Qty: 45 TABLET | Refills: 0 | Status: ON HOLD | OUTPATIENT
Start: 2023-02-23 | End: 2023-05-15

## 2023-02-23 RX ORDER — OXYCODONE HYDROCHLORIDE 10 MG/1
5 TABLET ORAL EVERY 8 HOURS PRN
COMMUNITY
End: 2023-03-10

## 2023-02-23 RX ORDER — DULOXETIN HYDROCHLORIDE 30 MG/1
30 CAPSULE, DELAYED RELEASE ORAL 2 TIMES DAILY
COMMUNITY
End: 2023-07-05

## 2023-02-23 ASSESSMENT — ENCOUNTER SYMPTOMS
DIARRHEA: 1
WEAKNESS: 0
FREQUENCY: 0
HEMATURIA: 0
NAUSEA: 0
ARTHRALGIAS: 1
EYE PAIN: 0
SHORTNESS OF BREATH: 1
CONSTIPATION: 1
PARESTHESIAS: 0
CHILLS: 0
DYSURIA: 0
ABDOMINAL PAIN: 0
COUGH: 0
NERVOUS/ANXIOUS: 0
JOINT SWELLING: 1
HEMATOCHEZIA: 0
HEARTBURN: 0
SORE THROAT: 0
DIZZINESS: 0
FEVER: 0
MYALGIAS: 1
PALPITATIONS: 0
HEADACHES: 0

## 2023-02-23 ASSESSMENT — ACTIVITIES OF DAILY LIVING (ADL)
CURRENT_FUNCTION: MONEY MANAGEMENT REQUIRES ASSISTANCE
CURRENT_FUNCTION: HOUSEWORK REQUIRES ASSISTANCE
CURRENT_FUNCTION: PREPARING MEALS REQUIRES ASSISTANCE

## 2023-02-23 ASSESSMENT — PATIENT HEALTH QUESTIONNAIRE - PHQ9
SUM OF ALL RESPONSES TO PHQ QUESTIONS 1-9: 5
SUM OF ALL RESPONSES TO PHQ QUESTIONS 1-9: 5
10. IF YOU CHECKED OFF ANY PROBLEMS, HOW DIFFICULT HAVE THESE PROBLEMS MADE IT FOR YOU TO DO YOUR WORK, TAKE CARE OF THINGS AT HOME, OR GET ALONG WITH OTHER PEOPLE: NOT DIFFICULT AT ALL

## 2023-02-23 ASSESSMENT — PAIN SCALES - GENERAL: PAINLEVEL: MODERATE PAIN (4)

## 2023-02-23 NOTE — PROGRESS NOTES
"SUBJECTIVE:   Erickson is a 67 year old who presents for Preventive Visit.  Patient has been advised of split billing requirements and indicates understanding: Yes  Are you in the first 12 months of your Medicare coverage?  No    Healthy Habits:     In general, how would you rate your overall health?  Poor    Frequency of exercise:  1 day/week    Duration of exercise:  Less than 15 minutes    Do you usually eat at least 4 servings of fruit and vegetables a day, include whole grains    & fiber and avoid regularly eating high fat or \"junk\" foods?  No    Taking medications regularly:  No    Barriers to taking medications:  Other    Medication side effects:  None    Ability to successfully perform activities of daily living:  Preparing meals requires assistance, housework requires assistance and money management requires assistance    Home Safety:  No safety concerns identified    Hearing Impairment:  Difficulty following a conversation in a noisy restaurant or crowded room and difficulty understanding soft or whispered speech    In the past 6 months, have you been bothered by leaking of urine?  No    In general, how would you rate your overall mental or emotional health?  Good      PHQ-2 Total Score: 0    Additional concerns today:  No    He presents to the clinic to reestablish care.  He has a complex medical history significant for type 2 diabetes, peripheral neuropathy, peripheral vascular disease, coronary artery disease, history of NSTEMI with stent placement, amyloidosis, history of peripheral stem cell transplant, thrombocytopenia, obesity, fatty cirrhosis of the liver, bipolar disorder, migraine headaches, chronic pain syndrome, hypertension, hyperlipidemia and obstructive sleep apnea.  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  He needs to get established with various specialists.  He reports that he is already established at a local " pain clinic, but he needs to see a vascular/wound specialist for frequent wounds that develop in the lower legs, podiatrist for significant callus formation in his right foot/toes and a hammertoe of the left second toe.  He also needs to establish care with a local hematologist for the amyloidosis.  He was seeing a specialist at the [facility] and this issue has been in remission.  He is in the process of establishing with a new psychiatrist.  He is requesting a refill of modafinil that he takes for obstructive sleep apnea/excessive somnolence.    Have you ever done Advance Care Planning? (For example, a Health Directive, POLST, or a discussion with a medical provider or your loved ones about your wishes): Yes, patient states has an Advance Care Planning document and will bring a copy to the clinic.       Fall risk  Fallen 2 or more times in the past year?: Yes  Any fall with injury in the past year?: Yes    Cognitive Screening   1) Repeat 3 items (Leader, Season, Table)    2) Clock draw: NORMAL  3) 3 item recall: Recalls 3 objects  Results: 3 items recalled: COGNITIVE IMPAIRMENT LESS LIKELY    Mini-CogTM Copyright S Shraddha. Licensed by the author for use in [facility]; reprinted with permission (jennyfer@[facility]). All rights reserved.      Do you have sleep apnea, excessive snoring or daytime drowsiness?: yes    Reviewed and updated as needed this visit by clinical staff   Tobacco  Allergies  Meds              Reviewed and updated as needed this visit by Provider     Meds             Social History     Tobacco Use     Smoking status: Never     Smokeless tobacco: Never   Substance Use Topics     Alcohol use: Not Currently     Alcohol/week: 0.0 standard drinks     If you drink alcohol do you typically have >3 drinks per day or >7 drinks per week? No    Alcohol Use 2/23/2023   Prescreen: >3 drinks/day or >7 drinks/week? No   Prescreen: >3 drinks/day or >7 drinks/week? -   No flowsheet data  found.          Current providers sharing in care for this patient include:   Patient Care Team:  No Ref-Primary, Physician as PCP - Kuldeep Harding MD as MD (Gastroenterology)  Vince Henry MD as Assigned PCP  Ang Rodrigues MD as Assigned Sleep Provider    The following health maintenance items are reviewed in Epic and correct as of today:  Health Maintenance   Topic Date Due     COLORECTAL CANCER SCREENING  08/01/2021     COVID-19 Vaccine (4 - Booster for Moderna series) 10/14/2021     DIABETIC FOOT EXAM  02/08/2022     BMP  06/01/2022     MICROALBUMIN  07/12/2022     MEDICARE ANNUAL WELLNESS VISIT  07/16/2022     LIPID  07/23/2022     EYE EXAM  05/27/2023     A1C  08/23/2023     ANNUAL REVIEW OF HM ORDERS  02/23/2024     FALL RISK ASSESSMENT  02/23/2024     ADVANCE CARE PLANNING  02/23/2028     DTAP/TDAP/TD IMMUNIZATION (9 - Td or Tdap) 01/22/2030     HEPATITIS C SCREENING  Completed     MIGRAINE ACTION PLAN  Completed     INFLUENZA VACCINE  Completed     Pneumococcal Vaccine: 65+ Years  Completed     ZOSTER IMMUNIZATION  Completed     HEPATITIS B IMMUNIZATION  Completed     AORTIC ANEURYSM SCREENING (SYSTEM ASSIGNED)  Completed     IPV IMMUNIZATION  Aged Out     MENINGITIS IMMUNIZATION  Aged Out     Lab work is in process  Labs reviewed in EPIC          Review of Systems   Constitutional: Negative for chills and fever.   HENT: Negative for congestion, ear pain, hearing loss and sore throat.    Eyes: Negative for pain and visual disturbance.   Respiratory: Positive for shortness of breath. Negative for cough.    Cardiovascular: Positive for peripheral edema. Negative for chest pain and palpitations.   Gastrointestinal: Positive for constipation and diarrhea. Negative for abdominal pain, heartburn, hematochezia and nausea.   Genitourinary: Positive for impotence. Negative for dysuria, frequency, genital sores, hematuria, penile discharge and urgency.   Musculoskeletal: Positive for  "arthralgias, joint swelling and myalgias.   Skin: Negative for rash.   Neurological: Negative for dizziness, weakness, headaches and paresthesias.   Psychiatric/Behavioral: Negative for mood changes. The patient is not nervous/anxious.          OBJECTIVE:   BP (!) 164/100   Pulse 83   Temp 97.3  F (36.3  C) (Temporal)   Resp 18   Ht 1.676 m (5' 6\")   Wt 112.3 kg (247 lb 8 oz)   SpO2 97%   BMI 39.95 kg/m   Estimated body mass index is 39.95 kg/m  as calculated from the following:    Height as of this encounter: 1.676 m (5' 6\").    Weight as of this encounter: 112.3 kg (247 lb 8 oz).  Physical Exam  GENERAL: healthy, alert and no distress  EYES: Eyes grossly normal to inspection, PERRL and conjunctivae and sclerae normal  HENT: ear canals and TM's normal, nose and mouth without ulcers or lesions  NECK: no adenopathy, no asymmetry, masses, or scars and thyroid normal to palpation  RESP: lungs clear to auscultation - no rales, rhonchi or wheezes  CV: regular rate and rhythm, normal S1 S2, no S3 or S4, no murmur, click or rub, no peripheral edema and peripheral pulses strong  ABDOMEN: soft, nontender, no hepatosplenomegaly, no masses and bowel sounds normal  MS: 1+ lower extremity edema with chronic venous stasis changes in the lower extremities.  SKIN: There is significant callus formation around the toes of his right foot.  NEURO: Normal strength and tone, mentation intact and speech normal.  Monofilament foot exam reveals decreased sensation in the greater toes.  PSYCH: mentation appears normal, affect normal/bright    Diagnostic Test Results:  Labs reviewed in Epic    ASSESSMENT / PLAN:   1. Encounter for Medicare annual wellness exam  I encouraged him to get regular physical activity and to eat a healthy diet.  We are going to check nonfasting labs today.    2. Type 2 diabetes mellitus with other circulatory complication, without long-term current use of insulin (H)  This issue has been under decent control " with metformin.  On 7/12/2021 his A1c was 6%.  - HEMOGLOBIN A1C; Future  - Albumin Random Urine Quantitative with Creat Ratio; Future  - Lipid panel reflex to direct LDL Non-fasting; Future  - HEMOGLOBIN A1C  - Albumin Random Urine Quantitative with Creat Ratio  - Lipid panel reflex to direct LDL Non-fasting    3. Peripheral vascular disease (H)  He was given a referral to establish care with a vascular specialist  - Vascular Surgery Referral; Future    4. NSTEMI (non-ST elevated myocardial infarction) (H)  I recommended he continue his current medications and he was given a referral to establish with cardiology.  - Adult Cardiology Eval  Referral; Future    5. Other amyloidosis (H)  He reports that this issue has been in remission.  He was seeing a specialist at the [facility] and would like to establish with someone locally  - Adult Oncology/Hematology  Referral; Future    6. History of peripheral stem cell transplant (H)  - Adult Oncology/Hematology  Referral; Future    7. Thrombocytopenia, unspecified (H)  We will be checking a CBC as part of today's lab work-up.    8. Morbid obesity (H)  He will continue to work on lifestyle modifications help with weight loss.    9. Cirrhosis of liver without ascites, unspecified hepatic cirrhosis type (H)  We will be checking liver enzymes as part of today's lab work-up.  He is in the process of establishing care with GI.    10. Bipolar I disorder (H)  I recommended he continue his current medications and he is in the process of establishing with psychiatry.    11. Peripheral polyneuropathy  Continue Lyrica.    12. Chronic pain syndrome  He is established at a pain clinic and has a pain pump.    13. Hypertension goal BP (blood pressure) < 140/90  Blood pressure is high today, but he reports not taking any of his medications this morning before the appointment.  - Comprehensive metabolic panel (BMP + Alb, Alk Phos, ALT, AST, Total. Bili, TP);  Future  - Comprehensive metabolic panel (BMP + Alb, Alk Phos, ALT, AST, Total. Bili, TP)    14. Hyperlipidemia LDL goal <70  We are checking a cholesterol panel as part of today's lab work-up.    15. Obstructive sleep apnea syndrome  I recommended CPAP and he was given a refill of modafinil    16. Callus of foot  He was given a referral to podiatry for further evaluation.  - Orthopedic  Referral; Future    17. Hammertoe of left foot  He was given a referral to podiatry.  - Orthopedic  Referral; Future    18. Intractable migraine without status migrainosus, unspecified migraine type  Continue Depakote which has been helpful for prevention of migraines and he has sumatriptan as needed.      Patient has been advised of split billing requirements and indicates understanding: Yes      COUNSELING:  Reviewed preventive health counseling, as reflected in patient instructions       Regular exercise       Healthy diet/nutrition        He reports that he has never smoked. He has never used smokeless tobacco.      Appropriate preventive services were discussed with this patient, including applicable screening as appropriate for cardiovascular disease, diabetes, osteopenia/osteoporosis, and glaucoma.  As appropriate for age/gender, discussed screening for colorectal cancer, prostate cancer, breast cancer, and cervical cancer. Checklist reviewing preventive services available has been given to the patient.    Reviewed patients plan of care and provided an AVS. The Basic Care Plan (routine screening as documented in Health Maintenance) for Parmjit meets the Care Plan requirement. This Care Plan has been established and reviewed with the Patient.          Sherwin Mcdermott, [facility]    Identified Health Risks:  Answers for HPI/ROS submitted by the patient on 2/23/2023  If you checked off any problems, how difficult have these problems made it for you to do your work, take care of  things at home, or get along with other people?: Not difficult at all  PHQ9 TOTAL SCORE: 5

## 2023-02-23 NOTE — PROGRESS NOTES
The patient was provided with suggestions to help him develop a healthy physical lifestyle.  He is at risk for lack of exercise and has been provided with information to increase physical activity for the benefit of his well-being.  The patient was counseled and encouraged to consider modifying their diet and eating habits. He was provided with information on recommended healthy diet options.  The patient reports that he has difficulty with activities of daily living. I have asked that the patient make a follow up appointment in *** weeks where this issue will be further evaluated and addressed.  The patient was provided with written information regarding signs of hearing loss.  The patient's PHQ-9 score is consistent with mild depression. He was provided with information regarding depression and was advised to schedule a follow up appointment in *** weeks to further address this issue.  He is at risk for falling and has been provided with information to reduce the risk of falling at home.

## 2023-02-23 NOTE — NURSING NOTE
Prior to immunization administration, verified patients identity using patient s name and date of birth. Please see Immunization Activity for additional information.     Screening Questionnaire for Adult Immunization    Are you sick today?   No   Do you have allergies to medications, food, a vaccine component or latex?   No   Have you ever had a serious reaction after receiving a vaccination?   No   Do you have a long-term health problem with heart, lung, kidney, or metabolic disease (e.g., diabetes), asthma, a blood disorder, no spleen, complement component deficiency, a cochlear implant, or a spinal fluid leak?  Are you on long-term aspirin therapy?   No   Do you have cancer, leukemia, HIV/AIDS, or any other immune system problem?   No   Do you have a parent, brother, or sister with an immune system problem?   No   In the past 3 months, have you taken medications that affect  your immune system, such as prednisone, other steroids, or anticancer drugs; drugs for the treatment of rheumatoid arthritis, Crohn s disease, or psoriasis; or have you had radiation treatments?   No   Have you had a seizure, or a brain or other nervous system problem?   No   During the past year, have you received a transfusion of blood or blood    products, or been given immune (gamma) globulin or antiviral drug?   No   For women: Are you pregnant or is there a chance you could become       pregnant during the next month?   No   Have you received any vaccinations in the past 4 weeks?   No     Immunization questionnaire answers were all negative.        Per orders of Dr. Garcia, injection of [id number] given by Amita Pedro RN. Patient instructed to remain in clinic for 15 minutes afterwards, and to report any adverse reaction to me immediately.       Screening performed by Amita Pedro RN on 2/23/2023 at 12:20 PM.

## 2023-02-23 NOTE — TELEPHONE ENCOUNTER
Pt referred to [facility] by Sherwin Mejia DO for PAD.    Pt previously seen by Dr. Ferrer and did not respond to follow up letters.    Pt needs to be scheduled for return visit with Dr. Ferrer.  Will route to scheduling to coordinate an appointment at next available..    Appt note: Ref by Dr. Sherwin Mcdermott for PAD; pt previously established with Dr. Ferrer (7/7/21); notes in Epic.    PRINCESS NicoleN, RN-[facility] [place]

## 2023-02-23 NOTE — PROGRESS NOTES
[facility]: Cancer Care                                                                   Hem/Onc  Referral reviewed:  Referred By:  Sherwin Mejia,    [address] [address]   Phone: [phone]   Fax: [phone]   Diagnoses: History of peripheral stem cell transplant (H)   Other amyloidosis (H)   Order: Adult Oncology/Hematology  Referral       ASSESSMENT      Clinical History (per Nurse review of records provided):    67 year old male patient with history of Amyloidosis s/p SCT in 12/2016    Lives:  [address] [address]    Insurance:  Payor: [employer] / Plan: [employer] MEDICARE ADVANTAGE / Product Type: HMO /     PCP:   No Ref-Primary, Physician    Records Location: [facility]    Pertinent labs -- BOOKMARKED    Pertinent imaging -- BOOKMARKED    Referring provider note(s)-- BOOKMARKED    INTERVENTION(S)                                                      Called patient to introduced myself and role as nurse navigator with [facility] Hematology/Oncology department and to inform them that we have received the referral for a diagnosis of Amyloidosis from Dr. Sherwin Mcdermott.     Patient did not answer the phone so a detailed message was left for them including NPS number. Requested callback to speak to a  to set the consult date/time/location. Explained to patient in the message that they will receive a call from our new patient scheduling team (NPS number below, hours are Monday - Friday 8am - 4:30 pm) in the next 1-2 business days to schedule the consultation. Encouraged them to call back with any questions.     Preferred [facility] Hem/Onc clinic location: [facility] [place]  Records team will contact pt directly if ROIs needed for records, imaging, slides    PLAN                                                      Message sent to NPS team to schedule patient with Dr. Hester or Dr. Bernal next available.     Avelina  Lizandro, RN, BSN  Hematology/Oncology New Patient Nurse Navigator   [facility]  [phone]

## 2023-02-23 NOTE — PATIENT INSTRUCTIONS
Patient Education   Personalized Prevention Plan  You are due for the preventive services outlined below.  Your care team is available to assist you in scheduling these services.  If you have already completed any of these items, please share that information with your care team to update in your medical record.  Health Maintenance Due   Topic Date Due     Pneumococcal Vaccine (3 - PPSV23 if available, else PCV20) 01/22/2021     Colorectal Cancer Screening  08/01/2021     COVID-19 Vaccine (4 - Booster for Moderna series) 10/14/2021     A1C Lab  01/12/2022     Diabetic Foot Exam  02/08/2022     Basic Metabolic Panel  06/01/2022     ANNUAL REVIEW OF HM ORDERS  06/02/2022     Kidney Microalbumin Urine Test  07/12/2022     Annual Wellness Visit  07/16/2022     Cholesterol Lab  07/23/2022     Your Health Risk Assessment indicates you feel you are not in good health    A healthy lifestyle helps keep the body fit and the mind alert. It helps protect you from disease, helps you fight disease, and helps prevent chronic disease (disease that doesn't go away) from getting worse. This is important as you get older and begin to notice twinges in muscles and joints and a decline in the strength and stamina you once took for granted. A healthy lifestyle includes good healthcare, good nutrition, weight control, recreation, and regular exercise. Avoid harmful substances and do what you can to keep safe. Another part of a healthy lifestyle is stay mentally active and socially involved.    Good healthcare     Have a wellness visit every year.     If you have new symptoms, let us know right away. Don't wait until the next checkup.     Take medicines exactly as prescribed and keep your medicines in a safe place. Tell us if your medicine causes problems.   Healthy diet and weight control     Eat 3 or 4 small, nutritious, low-fat, high-fiber meals a day. Include a variety of fruits, vegetables, and whole-grain foods.     Make sure you  get enough calcium in your diet. Calcium, vitamin D, and exercise help prevent osteoporosis (bone thinning).     If you live alone, try eating with others when you can. That way you get a good meal and have company while you eat it.     Try to keep a healthy weight. If you eat more calories than your body uses for energy, it will be stored as fat and you will gain weight.     Recreation   Recreation is not limited to sports and team events. It includes any activity that provides relaxation, interest, enjoyment, and exercise. Recreation provides an outlet for physical, mental, and social energy. It can give a sense of worth and achievement. It can help you stay healthy.    Mental Exercise and Social Involvement  Mental and emotional health is as important as physical health. Keep in touch with friends and family. Stay as active as possible. Continue to learn and challenge yourself.   Things you can do to stay mentally active are:    Learn something new, like a foreign language or musical instrument.     Play SCRABBLE or do crossword puzzles. If you cannot find people to play these games with you at home, you can play them with others on your computer through the Internet.     Join a games club--anything from card games to chess or checkers or lawn bowling.     Start a new hobby.     Go back to school.     Volunteer.     Read.   Keep up with world events.    Exercise for a Healthier Heart  You may wonder how you can improve the health of your heart. If you re thinking about exercise, you re on the right track. You don t need to become an athlete. But you do need a certain amount of brisk exercise to help strengthen your heart. If you have been diagnosed with a heart condition, your healthcare provider may advise exercise to help stabilize your condition. To help make exercise a habit, choose safe, fun activities.      Exercise with a friend. When activity is fun, you're more likely to stick with it.   Before you  start  Check with your healthcare provider before starting an exercise program. This is especially important if you have not been active for a while. It's also important if you have a long-term (chronic) health problem such as heart disease, diabetes, or obesity. Or if you are at high risk for having these problems.   Why exercise?  Exercising regularly offers many healthy rewards. It can help you do all of the following:     Improve your blood cholesterol level to help prevent further heart trouble    Lower your blood pressure to help prevent a stroke or heart attack    Control diabetes, or reduce your risk of getting this disease    Improve your heart and lung function    Reach and stay at a healthy weight    Make your muscles stronger so you can stay active    Prevent falls and fractures by slowing the loss of bone mass (osteoporosis)    Manage stress better    Reduce your blood pressure    Improve your sense of self and your body image  Exercise tips      Ease into your routine. Set small goals. Then build on them. If you are not sure what your activity level should be, talk with your healthcare provider first before starting an exercise routine.    Exercise on most days. Aim for a total of 150 minutes (2 hours and 30 minutes) or more of moderate-intensity aerobic activity each week. Or 75 minutes (1 hour and 15 minutes) or more of vigorous-intensity aerobic activity each week. Or try for a combination of both. Moderate activity means that you breathe heavier and your heart rate increases but you can still talk. Think about doing 40 minutes of moderate exercise, 3 to 4 times a week. For best results, activity should last for about 40 minutes to lower blood pressure and cholesterol. It's OK to work up to the 40-minute period over time. Examples of moderate-intensity activity are walking 1 mile in 15 minutes. Or doing 30 to 45 minutes of yard work.    Step up your daily activity level.  Along with your exercise  program, try being more active the whole day. Walk instead of drive. Or park further away so that you take more steps each day. Do more household tasks or yard work. You may not be able to meet the advised mount of physical activity. But doing some moderate- or vigorous-intensity aerobic activity can help reduce your risk for heart disease. Your healthcare provider can help you figure out what is best for you.    Choose 1 or more activities you enjoy.  Walking is one of the easiest things you can do. You can also try swimming, riding a bike, dancing, or taking an exercise class.    When to call your healthcare provider  Call your healthcare provider if you have any of these:     Chest pain or feel dizzy or lightheaded    Burning, tightness, pressure, or heaviness in your chest, neck, shoulders, back, or arms    Abnormal shortness of breath    More joint or muscle pain    A very fast or irregular heartbeat (palpitations)  [employer] last reviewed this educational content on 7/1/2019 2000-2021 [employer]. All rights reserved. This information is not intended as a substitute for professional medical care. Always follow your healthcare professional's instructions.          Understanding USDA MyPlate  The USDA has guidelines to help you make healthy food choices. These are called MyPlate. MyPlate shows the food groups that make up healthy meals using the image of a place setting. Before you eat, think about the healthiest choices for what to put on your plate or in your cup or bowl. To learn more about building a healthy plate, visit www.choosemyplate.gov.    The food groups    Fruits. Any fruit or 100% fruit juice counts as part of the Fruit Group. Fruits may be fresh, canned, frozen, or dried, and may be whole, cut-up, or pureed. Make 1/2 of your plate fruits and vegetables.    Vegetables. Any vegetable or 100% vegetable juice counts as a member of the Vegetable Group. Vegetables may be fresh, frozen,  canned, or dried. They can be served raw or cooked and may be whole, cut-up, or mashed. Make 1/2 of your plate fruits and vegetables.    Grains. All foods made from grains are part of the Grains Group. These include wheat, rice, oats, cornmeal, and barley. Grains are often used to make foods such as bread, pasta, oatmeal, cereal, tortillas, and grits. Grains should be no more than 1/4 of your plate. At least half of your grains should be whole grains.    Protein. This group includes meat, poultry, seafood, beans and peas, eggs, processed soy products (such as tofu), nuts (including nut butters), and seeds. Make protein choices no more than 1/4 of your plate. Meat and poultry choices should be lean or low fat.    Dairy. The Dairy Group includes all fluid milk products and foods made from milk that contain calcium, such as yogurt and cheese. (Foods that have little calcium, such as cream, butter, and cream cheese, are not part of this group.) Most dairy choices should be low-fat or fat-free.    Oils. Oils aren't a food group, but they do contain essential nutrients. However it's important to watch your intake of oils. These are fats that are liquid at room temperature. They include canola, corn, olive, soybean, vegetable, and sunflower oil. Foods that are mainly oil include mayonnaise, certain salad dressings, and soft margarines. You likely already get your daily oil allowance from the foods you eat.  Things to limit  Eating healthy also means limiting these things in your diet:       Salt (sodium). Many processed foods have a lot of sodium. To keep sodium intake down, eat fresh vegetables, meats, poultry, and seafood when possible. Purchase low-sodium, reduced-sodium, or no-salt-added food products at the store. And don't add salt to your meals at home. Instead, season them with herbs and spices such as dill, oregano, cumin, and paprika. Or try adding flavor with lemon or lime zest and juice.    Saturated fat.  Saturated fats are most often found in animal products such as beef, pork, and chicken. They are often solid at room temperature, such as butter. To reduce your saturated fat intake, choose leaner cuts of meat and poultry. And try healthier cooking methods such as grilling, broiling, roasting, or baking. For a simple lower-fat swap, use plain nonfat yogurt instead of mayonnaise when making potato salad or macaroni salad.    Added sugars. These are sugars added to foods. They are in foods such as ice cream, candy, soda, fruit drinks, sports drinks, energy drinks, cookies, pastries, jams, and syrups. Cut down on added sugars by sharing sweet treats with a family member or friend. You can also choose fruit for dessert, and drink water or other unsweetened beverages.     [employer] last reviewed this educational content on 6/1/2020 2000-2021 [employer]. All rights reserved. This information is not intended as a substitute for professional medical care. Always follow your healthcare professional's instructions.        Activities of Daily Living    Your Health Risk Assessment indicates you have difficulties with activities of daily living such as housework, bathing, preparing meals, taking medication, etc. Please make a follow up appointment for us to address this issue in more detail.    Signs of Hearing Loss      Hearing much better with one ear can be a sign of hearing loss.   Hearing loss is a problem shared by many people. In fact, it is one of the most common health problems, particularly as people age. Most people age 65 and older have some hearing loss. By age 80, almost everyone does. Hearing loss often occurs slowly over the years. So you may not realize your hearing has gotten worse.  Have your hearing checked  Call your healthcare provider if you:    Have to strain to hear normal conversation    Have to watch other people s faces very carefully to follow what they re saying    Need to ask  "people to repeat what they ve said    Often misunderstand what people are saying    Turn the volume of the television or radio up so high that others complain    Feel that people are mumbling when they re talking to you    Find that the effort to hear leaves you feeling tired and irritated    Notice, when using the phone, that you hear better with one ear than the other  [employer] last reviewed this educational content on 1/1/2020 2000-2021 [employer]. All rights reserved. This information is not intended as a substitute for professional medical care. Always follow your healthcare professional's instructions.          Depression and Suicide in Older Adults    Nearly 2 million older Americans have some type of depression. Some of them even take their own lives. Yet depression among older adults is often ignored. Learn the warning signs. You may help spare a loved one needless pain. You may also save a life.   What is depression?  Depression is a common and serious illness that affects the way you think and feel. It is not a normal part of aging, nor is it a sign of weakness, a character flaw, or something you can snap out of. Most people with depression need treatment to get better. The most common symptom is a feeling of deep sadness. People who are depressed also may seem tired and listless. And nothing seems to give them pleasure. It s normal to grieve or be sad sometimes. But sadness lessens or passes with time. Depression rarely goes away or improves on its own. A person with clinical depression can't \"snap out of it.\" Other symptoms of depression are:     Sleeping more or less than normal    Eating more or less than normal    Having headaches, stomachaches, or other pains that don t go away    Feeling nervous,  empty,  or worthless    Crying a great deal    Thinking or talking about suicide or death    Loss of interest in activities previously enjoyed    Social isolation    Feeling confused or " forgetful  What causes it?  The causes of depression aren t fully known. But it is thought to result from a complex blend of these factors:     Biochemistry. Certain chemicals in the brain play a role.    Genes. Depression does run in families.    Life stress. Life stresses can also trigger depression in some people. Older adults often face many stressors, such as death of friends or a spouse, health problems, and financial concerns.    Chronic conditions. This includes conditions such as diabetes, heart disease, or cancer. These can cause symptoms of depression. Medicine side effects can cause changes in thoughts and behaviors.  How you can help  Often, depressed people may not want to ask for help. When they do, they may be ignored. Or, they may receive the wrong treatment. You can help by showing parents and older friends love and support. If they seem depressed, don t lecture the person, ignore the symptoms, or discount the symptoms as a  normal  part of aging -which they are not. Get involved, listen, and show interest and support.   Help them understand that depression is a treatable illness. Tell them you can help them find the right treatment. Offer to go to their healthcare provider's appointment with them for support when the symptoms are discussed. With their approval, contact a local mental health center, social service agency, or hospital about services.   You can be an advocate for him or her at healthcare appointments. Many older adults have chronic illnesses that can cause symptoms of depression. Medicine side effects can change thoughts and behaviors. You can help make sure that the healthcare provider looks at all of these factors. He or she should refer your family member or friend to a mental healthcare provider when needed. in some cases, untreated depression can lead to a misdiagnosis. A person may be diagnosed with a brain disorder such as dementia. If the healthcare provider does not take the  issue of depression seriously, help your family member or friend to find another provider.   Don't be afraid to ask  If you think an older person you care about could be suicidal, ask,  Have you thought about suicide?  Most people will tell you the truth. If they say  yes,  they may already have a plan for how and when they will attempt it. Find out as much as you can. The more detailed the plan, and the easier it is to carry out, the more danger the person is in right now. Tell the person you are there for them and do not want them to harm him or herself. Don't wait to get help for the person. Call the person's healthcare provider, local hospital, or emergency services.   To learn more    National Suicide Prevention Lifeline (crisis hotline) 484-904-TIKH (706-074-6610)    National [place] of Mental Zlfhpv541-401-5252cvr.[facility].nih.gov    [place] on Mental Oimyclg648-346-7876tvl.mirna.org    Mental Health Maqeevs887-358-1815aaz.[facility].org    National Suicide Gcnhwbf551-SAKNRED (141-754-2041)    Call 911  Never leave the person alone. A person who is actively suicidal needs psychiatric care right away. They will need constant supervision. Never leave the person out of sight. Call 911 or the national 24-hour suicide crisis hotline at 458-489-HNRN (199-451-7163). You can also take the person to the closest emergency room.   [employer] last reviewed this educational content on 5/1/2020 2000-2021 [employer]. All rights reserved. This information is not intended as a substitute for professional medical care. Always follow your healthcare professional's instructions.          Preventing Falls at Home  A person can fall for many reasons. Older adults may fall because reaction time slows down as we age. Your muscles and joints may get stiff, weak, or less flexible because of illness, medicines, or a physical condition.   Other health problems that make falls more likely include:      Arthritis    Dizziness or lightheadedness when you stand up (orthostatic hypotension)    History of a stroke    Dizziness    Anemia    Certain medicines taken for mental illness or to control blood pressure.    Problems with balance or gait    Bladder or urinary problems    History of falling    Changes in vision (vision impairment)    Changes in thinking skills and memory (cognitive impairment)  Injuries from a fall can include serious injuries such as broken bones, dislocated joints, internal bleeding and cuts. Injuries like these can limit your independence.   Prevention tips  To help prevent falls and fall-related injuries, follow the tips below.    Floors  To make floors safer:     Put nonskid pads under area rugs.    Remove small rugs.    Replace worn floor coverings.    Tack carpets firmly to each step on carpeted stairs. Put nonskid strips on the edges of uncarpeted stairs.    Keep floors and stairs free of clutter and cords.    Arrange furniture so there are clear pathways.    Clean up any spills right away.  Bathrooms    To make bathrooms safer:     Install grab bars in the tub or shower.    Apply nonskid strips or put a nonskid rubber mat in the tub or shower.    Sit on a bath chair to bathe.    Use bathmats with nonskid backing.  Lighting  To improve visibility in your home:      Keep a flashlight in each room. Or put a lamp next to the bed within easy reach.    Put nightlights in the bedrooms, hallways, kitchen, and bathrooms.    Make sure all stairways have good lighting.    Take your time when going up and down stairs.    Put handrails on both sides of stairs and in walkways for more support. To prevent injury to your wrist or arm, don t use handrails to pull yourself up.    Install grab bars to pull yourself up.    Move or rearrange items that you use often. This will make them easier to find or reach.    Look at your home to find any safety hazards. Especially look at doorways, walkways, and the  driveway. Remove or repair any safety problems that you find.  Other changes to make    Look around to find any safety hazards. Look closely at doorways, walkways, and the driveway. Remove or repair any safety problems that you find.    Wear shoes that fit well.    Take your time when going up and down stairs.    Put handrails on both sides of stairs and in walkways for more support. To prevent injury to your wrist or arm, don t use handrails to pull yourself up.    Install grab bars wherever needed to pull yourself up.    Arrange items that you use often. This will make them easier to find or reach.    [employer] last reviewed this educational content on 3/1/2020    2627-9956 [employer]. All rights reserved. This information is not intended as a substitute for professional medical care. Always follow your healthcare professional's instructions.

## 2023-02-24 ENCOUNTER — TELEPHONE (OUTPATIENT)
Dept: CARDIOLOGY | Facility: CLINIC | Age: 68
End: 2023-02-24

## 2023-02-24 NOTE — TELEPHONE ENCOUNTER
[facility] Call Center    Phone Message    May a detailed message be left on voicemail: yes     Reason for Call: Other: Pt would like to schedule a follow up appt with Dr. Stoddard, template does not have any future dates for [place] location, please reach out to pt at [phone] to get scheduled.     Action Taken: Other: Cardiology    Travel Screening: Not Applicable     Thank you!  [facility]

## 2023-02-26 LAB — TESTOST SERPL-MCNC: 171 NG/DL (ref 240–950)

## 2023-03-01 ENCOUNTER — TELEPHONE (OUTPATIENT)
Dept: FAMILY MEDICINE | Facility: CLINIC | Age: 68
End: 2023-03-01
Payer: COMMERCIAL

## 2023-03-01 NOTE — TELEPHONE ENCOUNTER
DE,      Please see message below.  Called patient.  States he forgot to talk to you about his concerns when he saw on on 2/23    Do you want to do some type of visit to discuss?    Note: Elaine was his former PCP few years ago - he would like you to be his PCP.      Wandy Pena RN

## 2023-03-01 NOTE — TELEPHONE ENCOUNTER
Patient is calling because he would like to know if he should have an appointent with Dr. Henry. He recently saw Dr. Radha Mcdermott on 2/23/23.    Patient feels like he is having some short memory issues. Coworker pointed it out at work and he has also noticed issues. Should he follow up with Dr. Henry or should he get a referral to a specialist?     Is there anything that he can be doing in the mean time? Would vitamin supplements or other medications work?    Please call patient back to schedule an appointment or let him know if he should see specialist.    Penelope Santamaria RN  [facility]

## 2023-03-02 NOTE — TELEPHONE ENCOUNTER
Huddle with DE.  Can ignore message below.  Patient has VV scheduled with him in the morning    Wandy Pena RN

## 2023-03-02 NOTE — TELEPHONE ENCOUNTER
Please contact this patient and let him know that I can put in a referral for neurology to have the memory issues evaluated further.  Also please let him know that the hemoglobin A1c is very high.  I recommend that he continue the metformin and we should add an additional medication such as Trulicity. I can send a prescription for this to his pharmacy.  It is in the same class as Victoza which caused increased migraines, but he may tolerate it better since it is a once a week dose instead of daily.  Thank you, DE

## 2023-03-03 ENCOUNTER — OFFICE VISIT (OUTPATIENT)
Dept: PODIATRY | Facility: CLINIC | Age: 68
End: 2023-03-03
Attending: FAMILY MEDICINE
Payer: COMMERCIAL

## 2023-03-03 ENCOUNTER — VIRTUAL VISIT (OUTPATIENT)
Dept: FAMILY MEDICINE | Facility: CLINIC | Age: 68
End: 2023-03-03
Payer: COMMERCIAL

## 2023-03-03 VITALS — BODY MASS INDEX: 39.7 KG/M2 | HEIGHT: 66 IN | HEART RATE: 116 BPM | WEIGHT: 247 LBS

## 2023-03-03 DIAGNOSIS — E29.1 HYPOGONADISM MALE: ICD-10-CM

## 2023-03-03 DIAGNOSIS — R41.3 MEMORY DIFFICULTY: Primary | ICD-10-CM

## 2023-03-03 DIAGNOSIS — I21.4 NSTEMI (NON-ST ELEVATED MYOCARDIAL INFARCTION) (H): ICD-10-CM

## 2023-03-03 DIAGNOSIS — E11.40 TYPE 2 DIABETES, CONTROLLED, WITH NEUROPATHY (H): ICD-10-CM

## 2023-03-03 DIAGNOSIS — R93.0 ABNORMAL MRI OF HEAD: ICD-10-CM

## 2023-03-03 DIAGNOSIS — I73.9 PERIPHERAL VASCULAR DISEASE (H): ICD-10-CM

## 2023-03-03 DIAGNOSIS — G89.4 CHRONIC PAIN SYNDROME: ICD-10-CM

## 2023-03-03 DIAGNOSIS — F41.9 ANXIETY: ICD-10-CM

## 2023-03-03 DIAGNOSIS — E11.59 TYPE 2 DIABETES MELLITUS WITH OTHER CIRCULATORY COMPLICATION, WITHOUT LONG-TERM CURRENT USE OF INSULIN (H): ICD-10-CM

## 2023-03-03 DIAGNOSIS — E66.01 MORBID OBESITY (H): ICD-10-CM

## 2023-03-03 DIAGNOSIS — L84 CALLUS OF FOOT: ICD-10-CM

## 2023-03-03 PROCEDURE — 99215 OFFICE O/P EST HI 40 MIN: CPT | Mod: VID | Performed by: FAMILY MEDICINE

## 2023-03-03 PROCEDURE — 99213 OFFICE O/P EST LOW 20 MIN: CPT | Performed by: PODIATRIST

## 2023-03-03 RX ORDER — LORAZEPAM 1 MG/1
1 TABLET ORAL
Qty: 1 TABLET | Refills: 0 | Status: SHIPPED | OUTPATIENT
Start: 2023-03-03 | End: 2023-03-31

## 2023-03-03 NOTE — PATIENT INSTRUCTIONS
PATIENT INSTRUCTIONS - Podiatry / Foot & Ankle Surgery          [facility] LOC[facility]  [facility]  [address]  [place] [address]  Phone: [phone]  Fax: [phone] [facility]  [address]  [place] [address]  Phone: [phone]   Fax: [phone]   [facility]  [address]  [place] [address]  Phone: [phone]  Fax: [phone] [facility]  [address]  [place] [address]  Phone: [phone]   Fax: [phone]   [facility]   [address]  [place] [address]  Phone: [phone]  Fax: [phone] * Please call any location listed to make an appointment for a casting/fitting. Your referral was sent to their central office and they will all have the order on file.                     Here is a list of routine foot care resources, which includes toenail trimming and callus/corn management.     This is not a referral. It is your responsibility to contact the organization and your insurance to confirm cost and coverage.      ROUTINE FOOT CARE (NAIL TRIMMING / CALLUSES)      [facility]  [phone]   [facility]  [phone]  Multiple locations   [facility]  [phone] Dr. Salinas and Dr. Manuel  [address]  [place] [address]    Office: [phone]

## 2023-03-03 NOTE — PROGRESS NOTES
Erickson is a 67 year old who is being evaluated via a billable video visit.      How would you like to obtain your AVS? MyChart  If the video visit is dropped, the invitation should be resent by: Text to cell phone: [phone]  Will anyone else be joining your video visit? No          Assessment & Plan     Memory difficulty  I recommended he see neurology for further evaluation.  He would benefit from undergoing a formal neuropsychological evaluation to look into this further.  I also recommended repeating the MRI of the brain since there were some abnormalities seen in the scan from 2020.  - Adult Neurology  Referral; Future  - MR Brain w/o & w Contrast; Future    Abnormal MRI of head  I recommending a repeat MRI of the brain to further evaluate the worsening memory issues.  - MR Brain w/o & w Contrast; Future    Type 2 diabetes mellitus with other circulatory complication, without long-term current use of insulin (H)  His hemoglobin A1c came up to 9.3%.  I recommended he continue metformin and add Trulicity.  He will continue to work on lifestyle modifications which should be helpful as well.  - dulaglutide (TRULICITY) 0.75 MG/0.5ML pen; Inject 0.75 mg Subcutaneous every 7 days    Peripheral vascular disease (H)  He was given a referral to see a vascular specialist about this.  We will continue to work on trying to get better control of the diabetes and blood pressure.    NSTEMI (non-ST elevated myocardial infarction) (H)  He will be following with cardiology.  No medication changes were made today.    Morbid obesity (H)  Will continue to work on lifestyle modifications to help with weight loss which should help improve the diabetes and blood pressure issues.    Chronic pain syndrome  He will continue to follow with the pain clinic specialist.    Hypogonadism male  His total testosterone was low at 171 on 2/23/2023.  He is going to be seeing endocrinology.  He reports having topical testosterone at home  "that he is planning to restart.        40 minutes spent on the date of the encounter doing chart review, review of test results, interpretation of tests, patient visit and documentation        BMI:   Estimated body mass index is 39.87 kg/m  as calculated from the following:    Height as of an earlier encounter on 3/3/23: 1.676 m (5' 6\").    Weight as of an earlier encounter on 3/3/23: 112 kg (247 lb).   Weight management plan: Discussed healthy diet and exercise guidelines        Return in about 3 months (around 6/3/2023) for Diabetes Follow Up, Blood Pressure Follow Up.    Sherwin Mcdermott DO  [facility] is a 67 year old, presenting for the following health issues:  COGNITIVE ASSESSMENT      History of Present Illness       Reason for visit:  Possible cognitive impairment  Symptom onset:  More than a month  Symptoms include:  Short-term memory loss  Symptom intensity:  Moderate  Symptom progression:  Worsening  Had these symptoms before:  Yes  Has tried/received treatment for these symptoms:  No  What makes it worse:  N/a  What makes it better:  N/aHe consumes 1 sweetened beverage(s) daily. He exercises with enough effort to increase his heart rate 3 or less days per week. He is missing 2 dose(s) of medications per week.  He is not taking prescribed medications regularly due to remembering to take.             He describes struggling with worsening short-term memory over the past 10 years.  He describes having a very good memory for his entire life and reports having photographic memory skills that he is able to turn on and off.  Recently he has been struggling to remember details of conversations.  For example, he has trouble summarizing conversations with business partners.  He also reports calling a friend, but not knowing who he called after dialing the number.  He states that a close friend of his recently expressed concern about his memory and encouraged him " to seek medical attention.  When he brought this up with his  he also confirmed having similar concerns.  He denies having any major memory impairment episodes.  He is still managing his own finances and he is living independently.     He has a complex medical history significant for type 2 diabetes, peripheral neuropathy, peripheral vascular disease, coronary artery disease, history of NSTEMI with stent placement, amyloidosis, history of peripheral stem cell transplant, thrombocytopenia, obesity, fatty cirrhosis of the liver, bipolar disorder, migraine headaches, chronic pain syndrome, hypertension, hyperlipidemia and obstructive sleep apnea.    On 2/23/2023 he had several labs checked which showed a spike in the hemoglobin A1c up to 9.3%, glucose 305, ALT 51, LDL 79, total testosterone 171 and a positive urine microalbumin test.    He reports that there is significant improvement regarding his diet which he has been working on and the glucose numbers seem to be getting better at home.    He had an MRI of the brain on 11/16/2020 which showed a mildly enlarged pituitary gland and a right posterior frontal lobe high convexity right precentral gyrus possibly microhemorrhage or mineralization.  There were also some age-related changes noted.      EXAM: MR BRAIN W/O and W CONTRAST  LOCATION: [facility]  DATE/TIME: 11/16/2020 2:25 AM     INDICATION: Numbness and weakness  COMPARISON: None  CONTRAST: 12 mL Gadavist  TECHNIQUE: Routine multiplanar multisequence head MRI without and with intravenous contrast.     FINDINGS:  INTRACRANIAL CONTENTS: No acute or subacute infarct. No mass, acute hemorrhage, or extra-axial fluid collections. Scattered nonspecific T2/FLAIR hyperintensities within the cerebral white matter most consistent with mild chronic microvascular ischemic   change. Mild generalized cerebral atrophy. No hydrocephalus. Normal position of the cerebellar tonsils. No pathologic  contrast enhancement. Left posterior frontal lobe developmental venous anomaly. High convexity right precentral gyrus possible   microhemorrhage or mineralization versus volume averaging artifact with the adjacent calvarium on GRE (image 24). Expanded differential for microhemorrhage includes trauma, cavernous malformation, amyloid angiopathy, hypertensive microangiopathy.     SELLA: Pituitary is enlarged measuring 11 mm CC dimension with a convex superior border. Findings may reflect pituitary hyperplasia or underlying microadenoma. Pituitary infundibulum is midline and normal in size. Cavernous sinuses bilaterally are within   normal limits.     OSSEOUS STRUCTURES/SOFT TISSUES: Normal marrow signal. The major intracranial vascular flow voids are maintained.      ORBITS: No abnormality accounting for technique.      SINUSES/MASTOIDS: Mild patchy mucosal thickening primarily involving the ethmoid air cells. Remaining visualized paranasal sinuses essentially clear. Bilateral mastoid air cells mild patchy opacification.                                                                       IMPRESSION:  1.  Pituitary is mildly enlarged with a convex superior border. Findings may reflect pituitary hyperplasia or underlying microadenoma. Please correlate clinically. Follow-up as clinically warranted.  2.  No acute infarct.  3.  Age-related changes described above.  4.  Right posterior frontal lobe high convexity right precentral gyrus possibly microhemorrhage or mineralization as described above. Please see above for discussion.        Review of Systems   Constitutional, HEENT, cardiovascular, pulmonary, GI, , musculoskeletal, neuro, skin, endocrine and psych systems are negative, except as otherwise noted.      Objective           Vitals:  No vitals were obtained today due to virtual visit.    Physical Exam   GENERAL: Healthy, alert and no distress  EYES: Eyes grossly normal to inspection.  No discharge or erythema, or  obvious scleral/conjunctival abnormalities.  RESP: No audible wheeze, cough, or visible cyanosis.  No visible retractions or increased work of breathing.    SKIN: Visible skin clear. No significant rash, abnormal pigmentation or lesions.  NEURO: Cranial nerves grossly intact.  Mentation and speech appropriate for age.  PSYCH: Mentation appears normal, affect normal/bright, judgement and insight intact, normal speech and appearance well-groomed.                Video-Visit Details    Type of service:  Video Visit   Video Start Time: 8:25 AM  Video End Time:8:55 AM    Originating Location (pt. Location): Home    Distant Location (provider location):  On-site  Platform used for Video Visit: Danny

## 2023-03-03 NOTE — PROGRESS NOTES
Assessment:      ICD-10-CM    1. Callus of foot  L84 Orthopedic  Referral     [facility] DME Other (Comments)      2. Type 2 diabetes, controlled, with neuropathy (H)  E11.40 Orthopedic  Referral     [facility] Other (Comments)             Plan:    Callus - foot care nurse    DM shoe Rx - PCP can also prescribe          Lisseth Arana DPM                              Chief Complaint:     Patient presents with:  Right Foot - Pain       HPI:  Parmjit Hernández is a 67 year old year old male who presents for foot concern.      Past Medical & Surgical History:  Past Medical History:   Diagnosis Date     Acquired lymphedema 2/4/2019     Allergic state      Amyloidosis (H)     followed by hematology Dr. Romeo status post peripheral stem cell transplant     Anxiety 5/11/2016     Anxiety and depression 12/18/2013     Bipolar I disorder (H) 9/1/2015    Under care of psychiatrist [facility]- Dr Rahman doxepin 25 mg, 2 cap bedtime DULoxetine 20 mg once daily  OLANZapine 7.5 mg  1 tab bedtime      Depressive disorder 1995     EKG abnormality 4/20/2020     H/O bone marrow transplant (H)      Headache(784.0)      History of diverticulitis 1/27/2015    1/15-rxed with antibiotics      Hyperlipidemia LDL goal <100 10/31/2010     Hypertension 2007     Hypertension goal BP (blood pressure) < 140/90 10/11/2011     Marianne (H) 8/20/2015     Morbid obesity (H) 8/28/2018     Myalgia and myositis, unspecified      Narcotic dependence (H) 9/6/2016    None in about 6 months- 8/1/17     NSTEMI (non-ST elevated myocardial infarction) (H) 04/19/2020     OCD (obsessive compulsive disorder)      Other acquired absence of organ 94     Other cirrhosis of liver (H) possibly from vences  4/15/2020     Other specified viral warts      Peripheral edema 5/20/2018    Noncardiac Continue with  furosemide (LASIX) 20 MG tablet,  potassium       chloride SA (K-DUR/KLOR-CON M) 10 MEQ CR  Tab once daily  Basic  metabolic panel     Polyneuropathy associated with underlying disease (H) 2/4/2019     Restless legs syndrome (RLS) 6/29/2017     Stasis dermatitis of both legs 12/31/2018     Type 2 diabetes mellitus with other specified complication (H) 7/10/2017      Past Surgical History:   Procedure Laterality Date     ABDOMEN SURGERY  2007    abdominal hernia     BACK SURGERY  8/6/2020     BIOPSY  june 2015    negative     BMT PROTOCOL      for systemic amyloidosis     BONE MARROW BIOPSY, BONE SPECIMEN, NEEDLE/TROCAR N/A 6/8/2016    Procedure: BIOPSY BONE MARROW;  Surgeon: Nathan Agrawal MD;  Location:  GI     BONE MARROW BIOPSY, BONE SPECIMEN, NEEDLE/TROCAR N/A 2/20/2019    Procedure: BIOPSY BONE MARROW;  Surgeon: Michael Raygoza MD;  Location:  GI     CHOLECYSTECTOMY  1995    lap qian     COLONOSCOPY  2012    hx polyps     ESOPHAGOSCOPY, GASTROSCOPY, DUODENOSCOPY (EGD), COMBINED N/A 5/29/2015    Procedure: COMBINED ESOPHAGOSCOPY, GASTROSCOPY, DUODENOSCOPY (EGD), BIOPSY SINGLE OR MULTIPLE;  Surgeon: John Jacob MD;  Location:  GI     HERNIA REPAIR, UMBILICAL  2006     Z NONSPECIFIC PROCEDURE  94    Cholecystectomy     [facility] NONSPECIFIC PROCEDURE  2000    repair deviated septum      Family History   Problem Relation Age of Onset     Cerebrovascular Disease Mother      C.A.D. Mother      Hypertension Mother      Alcohol/Drug Mother      Arthritis Mother      Cerebrovascular Disease Maternal Grandmother      C.A.D. Maternal Grandmother      Alcohol/Drug Maternal Grandmother      C.A.D. Father      Heart Disease Father      Hypertension Father      Alcohol/Drug Father      Allergies Father      Circulatory Father      Depression Father      Respiratory Father      Asthma Father      Alcohol/Drug Paternal Grandfather      Cerebrovascular Disease Paternal Grandfather      Depression Son      Psychotic Disorder Son         anxiety disorder     Depression Daughter      Cerebrovascular Disease  Maternal Grandfather      Cerebrovascular Disease Paternal Grandmother      Diabetes Brother      Allergies Sister      Depression Sister      Gynecology Sister         Social History:  ?  History   Smoking Status     Never   Smokeless Tobacco     Never     History   Drug Use Unknown     Social History    Substance and Sexual Activity      Alcohol use: Not Currently        Alcohol/week: 0.0 standard drinks      Allergies:  ?   Allergies   Allergen Reactions     Cephalexin Diarrhea     Liraglutide Other (See Comments)     Sulfa Drugs Swelling     Pt has taken  Taken sulfa drugs orally without trouble. He had problems with Sulfa eye drops. Eye swelled up     Victoza      Increased migraine frequency and severity        Medications:    Current Outpatient Medications   Medication     acetaminophen (TYLENOL) 500 MG tablet     aspirin (ASPIRIN LOW DOSE) 81 MG tablet     atorvastatin (LIPITOR) 20 MG tablet     blood glucose (NO BRAND SPECIFIED) lancets standard     blood glucose (NO BRAND SPECIFIED) test strip     cariprazine (VRAYLAR) 3 MG CAPS capsule     divalproex sodium delayed-release (DEPAKOTE) 500 MG DR tablet     dulaglutide (TRULICITY) 0.75 MG/0.5ML pen     DULoxetine (CYMBALTA) 30 MG capsule     hydrochlorothiazide (HYDRODIURIL) 25 MG tablet     metoprolol succinate ER (TOPROL-XL) 25 MG 24 hr tablet     modafinil (PROVIGIL) 200 MG tablet     multivitamin w/minerals (THERA-VIT-M) tablet     NONFORMULARY     oxyCODONE IR (ROXICODONE) 10 MG tablet     pregabalin (LYRICA) 100 MG capsule     senna-docusate (SENOKOT-S/PERICOLACE) 8.6-50 MG tablet     SUMAtriptan (IMITREX) 50 MG tablet     tamsulosin (FLOMAX) 0.4 MG capsule     Zinc Acetate, Oral, (ZINC ACETATE PO)     metFORMIN (GLUCOPHAGE-XR) 500 MG 24 hr tablet     No current facility-administered medications for this visit.       Physical Exam:    Vitals:  [unfilled]  General:  WD/WN, in NAD.  A&O x3.  Dermatologic:   Skin is intact, open lesions absent.   Skin  texture, turgor is abnormal, venous insufficincey.  Vascular:  Pulses  Not  palpable due to edemat bilateral.  Digital capillary refill time normal bilateral.  Skin temperature is warm bilateral.  Generalized edema- pitting bilateral.  Neurologic:    Gross sensation normal.  Gait and balance abnormal, poor balance.  Musculoskeletal:  aROM digits, ankle intact.  Muscle strength intact to foot & ankle.  Deformity present none

## 2023-03-06 ENCOUNTER — TELEPHONE (OUTPATIENT)
Dept: FAMILY MEDICINE | Facility: CLINIC | Age: 68
End: 2023-03-06
Payer: COMMERCIAL

## 2023-03-06 DIAGNOSIS — S81.009A OPEN WOUND OF KNEE, LEG, AND ANKLE, UNSPECIFIED LATERALITY, INITIAL ENCOUNTER: ICD-10-CM

## 2023-03-06 DIAGNOSIS — S81.809A OPEN WOUND OF KNEE, LEG, AND ANKLE, UNSPECIFIED LATERALITY, INITIAL ENCOUNTER: ICD-10-CM

## 2023-03-06 DIAGNOSIS — I73.9 PERIPHERAL VASCULAR DISEASE (H): Primary | ICD-10-CM

## 2023-03-06 DIAGNOSIS — S91.009A OPEN WOUND OF KNEE, LEG, AND ANKLE, UNSPECIFIED LATERALITY, INITIAL ENCOUNTER: ICD-10-CM

## 2023-03-06 NOTE — TELEPHONE ENCOUNTER
Pt stating that his right leg is swollen. It was weeping this morning. Pt would like a referral to a wound clinic. Pt stating this was discussed about possibly needing it at a visit.     Referral has been Td up if you would like to review and sign.     Janine Rice RN  [facility]

## 2023-03-07 ENCOUNTER — TRANSFERRED RECORDS (OUTPATIENT)
Dept: HEALTH INFORMATION MANAGEMENT | Facility: CLINIC | Age: 68
End: 2023-03-07

## 2023-03-07 ENCOUNTER — TELEPHONE (OUTPATIENT)
Dept: WOUND CARE | Facility: CLINIC | Age: 68
End: 2023-03-07
Payer: COMMERCIAL

## 2023-03-07 NOTE — TELEPHONE ENCOUNTER
Consult received via Workqueue from Sherwin Mejia DO in [facility] for wound of the right leg.    Please schedule with Dr. Stanford or Dr. Flood at [facility] for next available appointment.    **If scheduling with Selam MOBLEY please schedule a follow up 2-3 weeks after initial appointment.    Is the patient able to make their own medical decisions? yes  Is patient a THELMA lift? PLEASE INQUIRE WHEN MAKING THE APPOINTMENT AND PUT IN APPOINTMENT NOTES    Routing to  Wound Healing Scheduling.

## 2023-03-09 ENCOUNTER — ANCILLARY PROCEDURE (OUTPATIENT)
Dept: ULTRASOUND IMAGING | Facility: CLINIC | Age: 68
End: 2023-03-09
Attending: FAMILY MEDICINE
Payer: COMMERCIAL

## 2023-03-09 ENCOUNTER — OFFICE VISIT (OUTPATIENT)
Dept: FAMILY MEDICINE | Facility: CLINIC | Age: 68
End: 2023-03-09
Payer: COMMERCIAL

## 2023-03-09 VITALS
DIASTOLIC BLOOD PRESSURE: 89 MMHG | HEART RATE: 74 BPM | BODY MASS INDEX: 39.71 KG/M2 | SYSTOLIC BLOOD PRESSURE: 151 MMHG | RESPIRATION RATE: 18 BRPM | TEMPERATURE: 97.8 F | OXYGEN SATURATION: 98 % | WEIGHT: 246 LBS

## 2023-03-09 DIAGNOSIS — I10 UNCONTROLLED HYPERTENSION: ICD-10-CM

## 2023-03-09 DIAGNOSIS — F31.9 BIPOLAR I DISORDER (H): ICD-10-CM

## 2023-03-09 DIAGNOSIS — M79.661 PAIN OF RIGHT LOWER LEG: Primary | ICD-10-CM

## 2023-03-09 DIAGNOSIS — I87.2 VENOUS STASIS DERMATITIS OF BOTH LOWER EXTREMITIES: ICD-10-CM

## 2023-03-09 DIAGNOSIS — Z94.84 HISTORY OF PERIPHERAL STEM CELL TRANSPLANT (H): ICD-10-CM

## 2023-03-09 DIAGNOSIS — M79.661 PAIN OF RIGHT LOWER LEG: ICD-10-CM

## 2023-03-09 DIAGNOSIS — I10 HYPERTENSION GOAL BP (BLOOD PRESSURE) < 140/90: ICD-10-CM

## 2023-03-09 DIAGNOSIS — E11.65 UNCONTROLLED TYPE 2 DIABETES MELLITUS WITH HYPERGLYCEMIA (H): ICD-10-CM

## 2023-03-09 DIAGNOSIS — D69.6 THROMBOCYTOPENIA, UNSPECIFIED (H): ICD-10-CM

## 2023-03-09 DIAGNOSIS — L03.115 CELLULITIS OF RIGHT LOWER EXTREMITY: ICD-10-CM

## 2023-03-09 DIAGNOSIS — E85.89 OTHER AMYLOIDOSIS (H): ICD-10-CM

## 2023-03-09 DIAGNOSIS — K74.60 CIRRHOSIS OF LIVER WITHOUT ASCITES, UNSPECIFIED HEPATIC CIRRHOSIS TYPE (H): ICD-10-CM

## 2023-03-09 DIAGNOSIS — E11.69 TYPE 2 DIABETES MELLITUS WITH OTHER SPECIFIED COMPLICATION, WITHOUT LONG-TERM CURRENT USE OF INSULIN (H): ICD-10-CM

## 2023-03-09 LAB
BASOPHILS # BLD AUTO: 0.1 10E3/UL (ref 0–0.2)
BASOPHILS NFR BLD AUTO: 1 %
CRP SERPL-MCNC: 87.5 MG/L
D DIMER PPP FEU-MCNC: 0.43 UG/ML FEU (ref 0–0.5)
EOSINOPHIL # BLD AUTO: 0.3 10E3/UL (ref 0–0.7)
EOSINOPHIL NFR BLD AUTO: 3 %
ERYTHROCYTE [DISTWIDTH] IN BLOOD BY AUTOMATED COUNT: 14.3 % (ref 10–15)
HCT VFR BLD AUTO: 40.5 % (ref 40–53)
HGB BLD-MCNC: 14.7 G/DL (ref 13.3–17.7)
LYMPHOCYTES # BLD AUTO: 1 10E3/UL (ref 0.8–5.3)
LYMPHOCYTES NFR BLD AUTO: 10 %
MCH RBC QN AUTO: 33.3 PG (ref 26.5–33)
MCHC RBC AUTO-ENTMCNC: 36.3 G/DL (ref 31.5–36.5)
MCV RBC AUTO: 92 FL (ref 78–100)
MONOCYTES # BLD AUTO: 0.6 10E3/UL (ref 0–1.3)
MONOCYTES NFR BLD AUTO: 6 %
NEUTROPHILS # BLD AUTO: 8.4 10E3/UL (ref 1.6–8.3)
NEUTROPHILS NFR BLD AUTO: 81 %
PLATELET # BLD AUTO: 249 10E3/UL (ref 150–450)
RBC # BLD AUTO: 4.41 10E6/UL (ref 4.4–5.9)
WBC # BLD AUTO: 10.3 10E3/UL (ref 4–11)

## 2023-03-09 PROCEDURE — 85025 COMPLETE CBC W/AUTO DIFF WBC: CPT | Performed by: FAMILY MEDICINE

## 2023-03-09 PROCEDURE — 85379 FIBRIN DEGRADATION QUANT: CPT | Performed by: FAMILY MEDICINE

## 2023-03-09 PROCEDURE — 93971 EXTREMITY STUDY: CPT | Mod: RT | Performed by: RADIOLOGY

## 2023-03-09 PROCEDURE — 99214 OFFICE O/P EST MOD 30 MIN: CPT | Performed by: FAMILY MEDICINE

## 2023-03-09 PROCEDURE — 86140 C-REACTIVE PROTEIN: CPT | Performed by: FAMILY MEDICINE

## 2023-03-09 PROCEDURE — 36415 COLL VENOUS BLD VENIPUNCTURE: CPT | Performed by: FAMILY MEDICINE

## 2023-03-09 RX ORDER — HYDROCHLOROTHIAZIDE 25 MG/1
37.5 TABLET ORAL DAILY
Qty: 135 TABLET | Refills: 0 | Status: SHIPPED | OUTPATIENT
Start: 2023-03-09 | End: 2023-06-01

## 2023-03-09 RX ORDER — METFORMIN HCL 500 MG
1000 TABLET, EXTENDED RELEASE 24 HR ORAL 2 TIMES DAILY WITH MEALS
Qty: 360 TABLET | Refills: 1 | COMMUNITY
Start: 2023-03-09 | End: 2023-07-05

## 2023-03-09 NOTE — TELEPHONE ENCOUNTER
Patient returned call  Noted edema to R leg  Also pain, believes to be related to fibromyalgia  Scheduled for OV next AM  Divina AGRAWAL RN

## 2023-03-09 NOTE — PROGRESS NOTES
Assessment & Plan     Pain of right lower leg  Plan: venous doppler to rule out DVT.  - D dimer, quantitative; Future  - US Lower Extremity Venous Duplex Right; Future      Cellulitis of right lower extremity  Plan: he has a very complex medical history and uncontrolled Diabetes  & hypertension  He is advised to start antibiotic and if the swelling and pain worsens any further he should be seen and or ideally admitted directly to hospital- nagi if not tolerating antibiotic    He also reports he has ? Wound clinic appointment- he sounds unsure- when is that- in any case the wound need to be review in 24-28 hours.    - amoxicillin-clavulanate (AUGMENTIN) 875-125 MG tablet; Take 1 tablet by mouth 2 times daily  CBC. CRP      Wound was redressed with simple dressing.    Venous stasis dermatitis of both lower extremities  Elevate legs.  Eventually be seen in clinic.  - Lymphedema Therapy Referral; Future    Uncontrolled type 2 diabetes mellitus with hyperglycemia (H)      Uncontrolled hypertension  Increase the dose  - hydrochlorothiazide (HYDRODIURIL) 25 MG tablet; Take 1.5 tablets (37.5 mg) by mouth daily        History of peripheral stem cell transplant (H)      Cirrhosis of liver without ascites, unspecified hepatic cirrhosis type (H)    Bipolar I disorder (H)    Other amyloidosis (H)      Thrombocytopenia, unspecified (H)          Type 2 diabetes mellitus with other specified complication, without long-term current use of insulin (H)    - metFORMIN (GLUCOPHAGE XR) 500 MG 24 hr tablet; Take 2 tablets (1,000 mg) by mouth 2 times daily (with meals)    Ordering of each unique test & medications    :[id number]}         Return in about 1 day (around 3/10/2023) for concerns,unresolved.   Follow-up Visit   Expected date:  Mar 16, 2023 (Approximate)      Follow Up Appointment Details:     Follow-up with whom?: Any Primary Care Provider    Follow-Up for what?: Acute Issue Recheck    How?: In Person                    Maribeth Hernandez  MD Reva  [facility]    Lavinia Almendarez is a 67 year old, presenting for the following health issues:  Fibromyalgia      HPI     Concern - Fibromyalgia pain    Onset: x 2 days   Intensity: moderate  Progression of Symptoms:  same  Accompanying Signs & Symptoms: right leg edema     Longstanding history of stasis dermatitis in both legs.  He had been seen regularly at wound clinic in [place].  Recently relocated to [place].  Noted that the right leg edema is worsening, with more redness and seeping fluid.  The redness and pain has worsened in past few days.  He is being dressing himself and it gets soaked in fluid.    He has known diabetes, trying to change diet since past 2 days.  Diabetes is not controlled.    He was taking HCTZ is 37.5 mg once a day and in [place]/PCP change it to 25 mg once a day.  His blood pressure is slightly elevated and with the stasis dermatitis being more we talked about changing her antihypertensive back to 37.5 mg.      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  He reports wound clinic in [place] was excellent and he believes he already has an appointment but was unsure when his that appointment.    He is in the process of reestablishing care with multiple specialities and has appointments lined up.    He hascomplex medical history significant for type 2 diabetes, peripheral neuropathy, peripheral vascular disease, coronary artery disease, history of NSTEMI with stent placement, amyloidosis, history of peripheral stem cell transplant, thrombocytopenia, obesity, fatty cirrhosis of the liver, bipolar disorder, migraine headaches, chronic pain syndrome, hypertension, hyperlipidemia and obstructive sleep apnea.      Review of Systems   CONSTITUTIONAL: NEGATIVE for fever, chills, change in weight  ENT/MOUTH: NEGATIVE for ear, mouth and throat problems  RESP: NEGATIVE for significant cough or SOB  CV: NEGATIVE for  chest pain, palpitations or peripheral edema  : negative for dysuria, hematuria, decreased urinary stream, erectile dysfunction  NEURO: NEGATIVE for weakness, dizziness or paresthesias  PSYCHIATRIC: NEGATIVE for changes in mood or affect      Objective    BP (!) 151/89 (BP Location: Left arm, Patient Position: Sitting, Cuff Size: Adult Large)   Pulse 74   Temp 97.8  F (36.6  C) (Temporal)   Resp 18   Wt 111.6 kg (246 lb)   SpO2 98%   BMI 39.71 kg/m    Body mass index is 39.71 kg/m .  Physical Exam   GENERAL: healthy, alert and no distress  Lower extremity exam.  Bilateral peripheral edema/stasis dermatitis.  Right lower extremity has a dressing that and right lower medial ankle region is soaked.  Dressing was removed.  There is no ulcer.  There is a significant erythema covering the entire circumference of the lower ankle.  The redness from stasis dermatitis and edema extends over the knee.  Peripheral pulses are difficult to evaluate.  Calluses of all the toes unchanged.  He reports he has a podiatrist consultation coming up soon.  NECK: no adenopathy, no asymmetry, masses, or scars and thyroid normal to palpation  RESP: lungs clear to auscultation - no rales, rhonchi or wheezes  CV: regular rate and rhythm, normal S1 S2, no S3 or S4, no murmur, click or rub, no peripheral edema and peripheral pulses strong  ABDOMEN: soft, nontender, no hepatosplenomegaly, no masses and bowel sounds normal    No results found. However, due to the size of the patient record, not all encounters were searched. Please check Results Review for a complete set of results.

## 2023-03-10 ENCOUNTER — HOSPITAL ENCOUNTER (INPATIENT)
Facility: CLINIC | Age: 68
LOS: 3 days | Discharge: HOME | DRG: 603 | End: 2023-03-15
Attending: EMERGENCY MEDICINE | Admitting: INTERNAL MEDICINE
Payer: COMMERCIAL

## 2023-03-10 ENCOUNTER — OFFICE VISIT (OUTPATIENT)
Dept: FAMILY MEDICINE | Facility: CLINIC | Age: 68
End: 2023-03-10
Payer: COMMERCIAL

## 2023-03-10 VITALS
RESPIRATION RATE: 16 BRPM | TEMPERATURE: 98 F | WEIGHT: 251 LBS | BODY MASS INDEX: 40.34 KG/M2 | HEIGHT: 66 IN | DIASTOLIC BLOOD PRESSURE: 82 MMHG | OXYGEN SATURATION: 96 % | SYSTOLIC BLOOD PRESSURE: 124 MMHG | HEART RATE: 84 BPM

## 2023-03-10 DIAGNOSIS — I87.2 VENOUS STASIS DERMATITIS OF BOTH LOWER EXTREMITIES: ICD-10-CM

## 2023-03-10 DIAGNOSIS — L03.115 CELLULITIS OF RIGHT LOWER EXTREMITY: ICD-10-CM

## 2023-03-10 DIAGNOSIS — L03.115 CELLULITIS OF RIGHT LOWER EXTREMITY: Primary | ICD-10-CM

## 2023-03-10 DIAGNOSIS — I89.0 LYMPHEDEMA: ICD-10-CM

## 2023-03-10 DIAGNOSIS — Z94.84 HISTORY OF PERIPHERAL STEM CELL TRANSPLANT (H): ICD-10-CM

## 2023-03-10 DIAGNOSIS — S42.202D CLOSED FRACTURE OF PROXIMAL END OF LEFT HUMERUS WITH ROUTINE HEALING, UNSPECIFIED FRACTURE MORPHOLOGY, SUBSEQUENT ENCOUNTER: ICD-10-CM

## 2023-03-10 DIAGNOSIS — E66.01 MORBID OBESITY (H): ICD-10-CM

## 2023-03-10 DIAGNOSIS — E85.89 OTHER AMYLOIDOSIS (H): ICD-10-CM

## 2023-03-10 DIAGNOSIS — R19.5 LOOSE STOOLS: Primary | ICD-10-CM

## 2023-03-10 DIAGNOSIS — I73.9 PERIPHERAL VASCULAR DISEASE (H): ICD-10-CM

## 2023-03-10 DIAGNOSIS — E11.9 TYPE 2 DIABETES MELLITUS WITHOUT COMPLICATION, UNSPECIFIED WHETHER LONG TERM INSULIN USE (H): ICD-10-CM

## 2023-03-10 DIAGNOSIS — I10 HYPERTENSION GOAL BP (BLOOD PRESSURE) < 140/90: ICD-10-CM

## 2023-03-10 DIAGNOSIS — E11.69 TYPE 2 DIABETES MELLITUS WITH OTHER SPECIFIED COMPLICATION, WITHOUT LONG-TERM CURRENT USE OF INSULIN (H): ICD-10-CM

## 2023-03-10 DIAGNOSIS — G62.9 PERIPHERAL POLYNEUROPATHY: ICD-10-CM

## 2023-03-10 DIAGNOSIS — I21.4 NSTEMI (NON-ST ELEVATED MYOCARDIAL INFARCTION) (H): ICD-10-CM

## 2023-03-10 DIAGNOSIS — R33.9 URINARY RETENTION WITH INCOMPLETE BLADDER EMPTYING: ICD-10-CM

## 2023-03-10 LAB
ALBUMIN SERPL BCG-MCNC: 3.7 G/DL (ref 3.5–5.2)
ALP SERPL-CCNC: 138 U/L (ref 40–129)
ALT SERPL W P-5'-P-CCNC: 29 U/L (ref 10–50)
ANION GAP SERPL CALCULATED.3IONS-SCNC: 10 MMOL/L (ref 7–15)
AST SERPL W P-5'-P-CCNC: 24 U/L (ref 10–50)
BASOPHILS # BLD AUTO: 0.1 10E3/UL (ref 0–0.2)
BASOPHILS NFR BLD AUTO: 1 %
BILIRUB SERPL-MCNC: 0.4 MG/DL
BUN SERPL-MCNC: 17.2 MG/DL (ref 8–23)
CALCIUM SERPL-MCNC: 9.2 MG/DL (ref 8.8–10.2)
CHLORIDE SERPL-SCNC: 93 MMOL/L (ref 98–107)
CREAT SERPL-MCNC: 0.82 MG/DL (ref 0.67–1.17)
CRP SERPL-MCNC: 59.06 MG/L
DEPRECATED HCO3 PLAS-SCNC: 28 MMOL/L (ref 22–29)
EOSINOPHIL # BLD AUTO: 0.3 10E3/UL (ref 0–0.7)
EOSINOPHIL NFR BLD AUTO: 3 %
ERYTHROCYTE [DISTWIDTH] IN BLOOD BY AUTOMATED COUNT: 13.5 % (ref 10–15)
GFR SERPL CREATININE-BSD FRML MDRD: >90 ML/MIN/1.73M2
GLUCOSE BLDC GLUCOMTR-MCNC: 199 MG/DL (ref 70–99)
GLUCOSE BLDC GLUCOMTR-MCNC: 201 MG/DL (ref 70–99)
GLUCOSE SERPL-MCNC: 242 MG/DL (ref 70–99)
HCT VFR BLD AUTO: 38.2 % (ref 40–53)
HGB BLD-MCNC: 13.6 G/DL (ref 13.3–17.7)
IMM GRANULOCYTES # BLD: 0.1 10E3/UL
IMM GRANULOCYTES NFR BLD: 1 %
LYMPHOCYTES # BLD AUTO: 1.1 10E3/UL (ref 0.8–5.3)
LYMPHOCYTES NFR BLD AUTO: 11 %
MCH RBC QN AUTO: 32.8 PG (ref 26.5–33)
MCHC RBC AUTO-ENTMCNC: 35.6 G/DL (ref 31.5–36.5)
MCV RBC AUTO: 92 FL (ref 78–100)
MONOCYTES # BLD AUTO: 0.6 10E3/UL (ref 0–1.3)
MONOCYTES NFR BLD AUTO: 6 %
NEUTROPHILS # BLD AUTO: 7.7 10E3/UL (ref 1.6–8.3)
NEUTROPHILS NFR BLD AUTO: 78 %
NRBC # BLD AUTO: 0 10E3/UL
NRBC BLD AUTO-RTO: 0 /100
PLATELET # BLD AUTO: 250 10E3/UL (ref 150–450)
POTASSIUM SERPL-SCNC: 3.7 MMOL/L (ref 3.4–5.3)
PROT SERPL-MCNC: 6.6 G/DL (ref 6.4–8.3)
RBC # BLD AUTO: 4.15 10E6/UL (ref 4.4–5.9)
SODIUM SERPL-SCNC: 131 MMOL/L (ref 136–145)
WBC # BLD AUTO: 9.8 10E3/UL (ref 4–11)

## 2023-03-10 PROCEDURE — 250N000013 HC RX MED GY IP 250 OP 250 PS 637: Performed by: INTERNAL MEDICINE

## 2023-03-10 PROCEDURE — 250N000011 HC RX IP 250 OP 636: Performed by: EMERGENCY MEDICINE

## 2023-03-10 PROCEDURE — 86140 C-REACTIVE PROTEIN: CPT | Performed by: EMERGENCY MEDICINE

## 2023-03-10 PROCEDURE — 36415 COLL VENOUS BLD VENIPUNCTURE: CPT | Performed by: EMERGENCY MEDICINE

## 2023-03-10 PROCEDURE — 96375 TX/PRO/DX INJ NEW DRUG ADDON: CPT

## 2023-03-10 PROCEDURE — G0378 HOSPITAL OBSERVATION PER HR: HCPCS

## 2023-03-10 PROCEDURE — 82962 GLUCOSE BLOOD TEST: CPT

## 2023-03-10 PROCEDURE — 99223 1ST HOSP IP/OBS HIGH 75: CPT | Performed by: INTERNAL MEDICINE

## 2023-03-10 PROCEDURE — 80053 COMPREHEN METABOLIC PANEL: CPT | Performed by: EMERGENCY MEDICINE

## 2023-03-10 PROCEDURE — 99215 OFFICE O/P EST HI 40 MIN: CPT | Performed by: FAMILY MEDICINE

## 2023-03-10 PROCEDURE — 250N000012 HC RX MED GY IP 250 OP 636 PS 637: Performed by: INTERNAL MEDICINE

## 2023-03-10 PROCEDURE — 99285 EMERGENCY DEPT VISIT HI MDM: CPT | Mod: 25

## 2023-03-10 PROCEDURE — 250N000011 HC RX IP 250 OP 636: Performed by: INTERNAL MEDICINE

## 2023-03-10 PROCEDURE — 96376 TX/PRO/DX INJ SAME DRUG ADON: CPT

## 2023-03-10 PROCEDURE — 96365 THER/PROPH/DIAG IV INF INIT: CPT

## 2023-03-10 PROCEDURE — 85025 COMPLETE CBC W/AUTO DIFF WBC: CPT | Performed by: EMERGENCY MEDICINE

## 2023-03-10 RX ORDER — ACETAMINOPHEN 325 MG/1
650 TABLET ORAL EVERY 6 HOURS PRN
Status: DISCONTINUED | OUTPATIENT
Start: 2023-03-10 | End: 2023-03-15 | Stop reason: HOSPADM

## 2023-03-10 RX ORDER — TESTOSTERONE GEL, 1% 10 MG/G
50 GEL TRANSDERMAL DAILY
Status: DISCONTINUED | OUTPATIENT
Start: 2023-03-10 | End: 2023-03-10

## 2023-03-10 RX ORDER — OXYCODONE AND ACETAMINOPHEN 5; 325 MG/1; MG/1
2 TABLET ORAL 2 TIMES DAILY
Status: DISCONTINUED | OUTPATIENT
Start: 2023-03-10 | End: 2023-03-13

## 2023-03-10 RX ORDER — ACETAMINOPHEN 500 MG
1000 TABLET ORAL DAILY PRN
COMMUNITY
End: 2023-03-31

## 2023-03-10 RX ORDER — FUROSEMIDE 10 MG/ML
10 INJECTION INTRAMUSCULAR; INTRAVENOUS ONCE
Status: COMPLETED | OUTPATIENT
Start: 2023-03-10 | End: 2023-03-10

## 2023-03-10 RX ORDER — AMOXICILLIN 250 MG
2 CAPSULE ORAL 2 TIMES DAILY PRN
Status: DISCONTINUED | OUTPATIENT
Start: 2023-03-10 | End: 2023-03-15 | Stop reason: HOSPADM

## 2023-03-10 RX ORDER — BUPROPION HYDROCHLORIDE 150 MG/1
150 TABLET ORAL EVERY MORNING
Status: DISCONTINUED | OUTPATIENT
Start: 2023-03-11 | End: 2023-03-15 | Stop reason: HOSPADM

## 2023-03-10 RX ORDER — AMPICILLIN AND SULBACTAM 2; 1 G/1; G/1
3 INJECTION, POWDER, FOR SOLUTION INTRAMUSCULAR; INTRAVENOUS ONCE
Status: COMPLETED | OUTPATIENT
Start: 2023-03-10 | End: 2023-03-10

## 2023-03-10 RX ORDER — NALOXONE HYDROCHLORIDE 0.4 MG/ML
0.2 INJECTION, SOLUTION INTRAMUSCULAR; INTRAVENOUS; SUBCUTANEOUS
Status: DISCONTINUED | OUTPATIENT
Start: 2023-03-10 | End: 2023-03-15 | Stop reason: HOSPADM

## 2023-03-10 RX ORDER — TESTOSTERONE GEL, 1% 10 MG/G
50 GEL TRANSDERMAL DAILY
COMMUNITY
End: 2023-07-27

## 2023-03-10 RX ORDER — NITROGLYCERIN 0.4 MG/1
0.4 TABLET SUBLINGUAL EVERY 5 MIN PRN
COMMUNITY
End: 2023-07-13

## 2023-03-10 RX ORDER — AMOXICILLIN 250 MG
1-2 CAPSULE ORAL 2 TIMES DAILY
Status: DISCONTINUED | OUTPATIENT
Start: 2023-03-10 | End: 2023-03-15 | Stop reason: HOSPADM

## 2023-03-10 RX ORDER — LOSARTAN POTASSIUM 50 MG/1
50 TABLET ORAL DAILY
COMMUNITY
End: 2023-03-31

## 2023-03-10 RX ORDER — AMOXICILLIN 250 MG
1 CAPSULE ORAL 2 TIMES DAILY PRN
Status: DISCONTINUED | OUTPATIENT
Start: 2023-03-10 | End: 2023-03-15 | Stop reason: HOSPADM

## 2023-03-10 RX ORDER — NALOXONE HYDROCHLORIDE 0.4 MG/ML
0.4 INJECTION, SOLUTION INTRAMUSCULAR; INTRAVENOUS; SUBCUTANEOUS
Status: DISCONTINUED | OUTPATIENT
Start: 2023-03-10 | End: 2023-03-15 | Stop reason: HOSPADM

## 2023-03-10 RX ORDER — FUROSEMIDE 10 MG/ML
20 INJECTION INTRAMUSCULAR; INTRAVENOUS EVERY 12 HOURS
Status: DISCONTINUED | OUTPATIENT
Start: 2023-03-10 | End: 2023-03-11

## 2023-03-10 RX ORDER — CEFAZOLIN SODIUM 2 G/100ML
2 INJECTION, SOLUTION INTRAVENOUS EVERY 8 HOURS
Status: DISCONTINUED | OUTPATIENT
Start: 2023-03-10 | End: 2023-03-15

## 2023-03-10 RX ORDER — PREGABALIN 100 MG/1
100 CAPSULE ORAL 3 TIMES DAILY
Status: DISCONTINUED | OUTPATIENT
Start: 2023-03-10 | End: 2023-03-15 | Stop reason: HOSPADM

## 2023-03-10 RX ORDER — CLOPIDOGREL BISULFATE 75 MG/1
75 TABLET ORAL DAILY
COMMUNITY
End: 2023-03-21

## 2023-03-10 RX ORDER — ONDANSETRON 2 MG/ML
4 INJECTION INTRAMUSCULAR; INTRAVENOUS EVERY 6 HOURS PRN
Status: DISCONTINUED | OUTPATIENT
Start: 2023-03-10 | End: 2023-03-15 | Stop reason: HOSPADM

## 2023-03-10 RX ORDER — TAMSULOSIN HYDROCHLORIDE 0.4 MG/1
0.4 CAPSULE ORAL 2 TIMES DAILY
Status: DISCONTINUED | OUTPATIENT
Start: 2023-03-10 | End: 2023-03-15 | Stop reason: HOSPADM

## 2023-03-10 RX ORDER — METOPROLOL SUCCINATE 25 MG/1
25 TABLET, EXTENDED RELEASE ORAL DAILY
Status: DISCONTINUED | OUTPATIENT
Start: 2023-03-11 | End: 2023-03-15 | Stop reason: HOSPADM

## 2023-03-10 RX ORDER — CLOPIDOGREL BISULFATE 75 MG/1
75 TABLET ORAL DAILY
Status: DISCONTINUED | OUTPATIENT
Start: 2023-03-11 | End: 2023-03-15 | Stop reason: HOSPADM

## 2023-03-10 RX ORDER — ASPIRIN 81 MG/1
81 TABLET ORAL DAILY
Status: DISCONTINUED | OUTPATIENT
Start: 2023-03-11 | End: 2023-03-15 | Stop reason: HOSPADM

## 2023-03-10 RX ORDER — ACETAMINOPHEN 650 MG/1
650 SUPPOSITORY RECTAL EVERY 6 HOURS PRN
Status: DISCONTINUED | OUTPATIENT
Start: 2023-03-10 | End: 2023-03-15 | Stop reason: HOSPADM

## 2023-03-10 RX ORDER — BUPROPION HYDROCHLORIDE 150 MG/1
150 TABLET ORAL EVERY MORNING
COMMUNITY
End: 2023-07-05

## 2023-03-10 RX ORDER — NICOTINE POLACRILEX 4 MG
15-30 LOZENGE BUCCAL
Status: DISCONTINUED | OUTPATIENT
Start: 2023-03-10 | End: 2023-03-15 | Stop reason: HOSPADM

## 2023-03-10 RX ORDER — LOSARTAN POTASSIUM 50 MG/1
50 TABLET ORAL DAILY
Status: DISCONTINUED | OUTPATIENT
Start: 2023-03-11 | End: 2023-03-15 | Stop reason: HOSPADM

## 2023-03-10 RX ORDER — PREGABALIN 100 MG/1
100 CAPSULE ORAL 3 TIMES DAILY
COMMUNITY
End: 2023-06-08

## 2023-03-10 RX ORDER — DEXTROSE MONOHYDRATE 25 G/50ML
25-50 INJECTION, SOLUTION INTRAVENOUS
Status: DISCONTINUED | OUTPATIENT
Start: 2023-03-10 | End: 2023-03-15 | Stop reason: HOSPADM

## 2023-03-10 RX ORDER — DULOXETIN HYDROCHLORIDE 30 MG/1
30 CAPSULE, DELAYED RELEASE ORAL 2 TIMES DAILY
Status: DISCONTINUED | OUTPATIENT
Start: 2023-03-10 | End: 2023-03-15 | Stop reason: HOSPADM

## 2023-03-10 RX ORDER — OXYCODONE AND ACETAMINOPHEN 5; 325 MG/1; MG/1
1 TABLET ORAL 2 TIMES DAILY PRN
COMMUNITY
End: 2023-11-07

## 2023-03-10 RX ORDER — ASPIRIN 81 MG/1
81 TABLET ORAL DAILY
Status: DISCONTINUED | OUTPATIENT
Start: 2023-03-10 | End: 2023-03-10

## 2023-03-10 RX ORDER — ONDANSETRON 4 MG/1
4 TABLET, ORALLY DISINTEGRATING ORAL EVERY 6 HOURS PRN
Status: DISCONTINUED | OUTPATIENT
Start: 2023-03-10 | End: 2023-03-15 | Stop reason: HOSPADM

## 2023-03-10 RX ADMIN — CEFAZOLIN SODIUM 2 G: 2 INJECTION, SOLUTION INTRAVENOUS at 18:58

## 2023-03-10 RX ADMIN — INSULIN ASPART 3 UNITS: 100 INJECTION, SOLUTION INTRAVENOUS; SUBCUTANEOUS at 17:56

## 2023-03-10 RX ADMIN — PREGABALIN 100 MG: 100 CAPSULE ORAL at 19:28

## 2023-03-10 RX ADMIN — AMPICILLIN SODIUM AND SULBACTAM SODIUM 3 G: 2; 1 INJECTION, POWDER, FOR SOLUTION INTRAMUSCULAR; INTRAVENOUS at 12:38

## 2023-03-10 RX ADMIN — ATORVASTATIN CALCIUM 30 MG: 20 TABLET, FILM COATED ORAL at 22:00

## 2023-03-10 RX ADMIN — DULOXETINE HYDROCHLORIDE 30 MG: 30 CAPSULE, DELAYED RELEASE PELLETS ORAL at 19:28

## 2023-03-10 RX ADMIN — SENNOSIDES AND DOCUSATE SODIUM 2 TABLET: 50; 8.6 TABLET ORAL at 19:27

## 2023-03-10 RX ADMIN — ACETAMINOPHEN 650 MG: 325 TABLET ORAL at 19:27

## 2023-03-10 RX ADMIN — PREGABALIN 100 MG: 100 CAPSULE ORAL at 16:55

## 2023-03-10 RX ADMIN — OXYCODONE HYDROCHLORIDE AND ACETAMINOPHEN 2 TABLET: 5; 325 TABLET ORAL at 19:28

## 2023-03-10 RX ADMIN — FUROSEMIDE 20 MG: 10 INJECTION, SOLUTION INTRAMUSCULAR; INTRAVENOUS at 17:56

## 2023-03-10 RX ADMIN — TAMSULOSIN HYDROCHLORIDE 0.4 MG: 0.4 CAPSULE ORAL at 19:28

## 2023-03-10 RX ADMIN — CARIPRAZINE 4.5 MG: 1.5 CAPSULE, GELATIN COATED ORAL at 19:27

## 2023-03-10 RX ADMIN — FUROSEMIDE 10 MG: 10 INJECTION, SOLUTION INTRAMUSCULAR; INTRAVENOUS at 14:57

## 2023-03-10 RX ADMIN — INSULIN GLARGINE 12 UNITS: 100 INJECTION, SOLUTION SUBCUTANEOUS at 22:13

## 2023-03-10 ASSESSMENT — ACTIVITIES OF DAILY LIVING (ADL)
ADLS_ACUITY_SCORE: 35
ADLS_ACUITY_SCORE: 36
ADLS_ACUITY_SCORE: 35

## 2023-03-10 ASSESSMENT — ENCOUNTER SYMPTOMS
COLOR CHANGE: 1
FEVER: 0
MYALGIAS: 1
CHILLS: 0

## 2023-03-10 ASSESSMENT — PAIN SCALES - GENERAL: PAINLEVEL: WORST PAIN (10)

## 2023-03-10 NOTE — PROGRESS NOTES
Assessment & Plan     Cellulitis of right lower extremity    Venous stasis dermatitis of both lower extremities    Peripheral vascular disease (H)    Type 2 diabetes mellitus with other specified complication, without long-term current use of insulin (H)    Other amyloidosis (H)    History of peripheral stem cell transplant (H)    Peripheral neuropathy    Morbid obesity    History of NSTEMI    Hypertension goal BP (blood pressure) < 140/90    I recommended he go to the emergency department for further evaluation of the right lower extremity swelling and erythema which appears to be progressively getting worse.  It is reassuring that the ultrasound yesterday was negative for DVT.  However, his current symptoms appear consistent with cellulitis. He was prescribed Augmentin yesterday, but has not started it.  He has several risk factors given his complex medical history of poorly controlled type 2 diabetes, peripheral vascular disease, neuropathy, obesity, hypertension, hyperlipidemia and coronary artery disease which places him at high risk to treat this as an outpatient.  I spoke to a provider at the emergency department to let them know about his symptoms, history and that he is heading in to be evaluated.          40 minutes spent on the date of the encounter doing chart review, review of test results, interpretation of tests, patient visit, documentation and discussion with other provider(s)            No follow-ups on file.    Sherwin Mcdermott [facility]   Erickson is a 67 year old, presenting for the following health issues:  Cellulitis      HPI     Cellulitis   Onset/Duration: follow up   Description  Location: right lower leg radiating up to thigh  Character: painful, draining yellow, red, swelling  Itching: no  Intensity:  severe  Progression of Symptoms:  worsening  Accompanying signs and symptoms:   Fever: No  Precipitating or alleviating factors:  none  Therapies tried and outcome: elevation and percocet and tylenol does not help         He presents to the clinic for follow-up regarding worsening right lower extremity swelling, redness and pain.  He was seen in our clinic for the symptoms yesterday.  He had labs checked which showed a normal white blood cell count of 10.3, negative D-dimer and a CRP of 87.5.  He had an ultrasound checked which was negative for DVT.  It was recommended that he increase the hydrochlorothiazide dose from 25 mg daily to 37.5 mg daily which was the dose he was previously on. He was also prescribed Augmentin yesterday, but has not started it. He reports that symptoms seem to be progressively getting worse.  He is having a lot of weeping in the right leg.  The left leg does not seem to be affected.  He is not complaining of chest pains, shortness of breath or fevers.  He has significant callus formation in the toes on his right foot, especially the great toe.  He is supposed to be seeing a wound care specialist soon since moving to the [place], but he has not had an appointment yet.      He has a complex medical history significant for poorly controlled type 2 diabetes, peripheral neuropathy, peripheral vascular disease, coronary artery disease, history of NSTEMI with stent placement, amyloidosis, history of peripheral stem cell transplant, thrombocytopenia, obesity, fatty cirrhosis of the liver, bipolar disorder, migraine headaches, chronic pain syndrome, hypertension, hyperlipidemia and obstructive sleep apnea.  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    On 2/23/2023 his hemoglobin A1c was 9.3% and the CMP results showed an elevated glucose of 305, ALT 51, creatinine 0.83 and sodium 135        ULTRASOUND LOWER EXTREMITY VENOUS DUPLEX RIGHT 3/9/2023 2:08 PM     CLINICAL HISTORY: stasis dermatitis/cellulitis-RLE; Pain of right  lower  "leg.     COMPARISONS: None available.     REFERRING PROVIDER: JULIETH RENDON     TECHNIQUE: Grayscale, color Doppler, Doppler waveform ultrasound  evaluation was performed through the right common femoral, femoral,  and popliteal veins. Right posterior tibial and peroneal veins were  evaluated with grayscale imaging and compression.     Left common femoral vein was evaluated for symmetry.     FINDINGS: Left common femoral vein is patent, fully compressible, and  demonstrates normal phasic Doppler waveform.     Right common femoral, femoral, and popliteal veins are fully  compressible, patent, and demonstrate normal phasic Doppler waveforms.     Right posterior tibial veins are fully compressible to the ankle.     Right peroneal veins are fully compressible to the distal calf.                                                                      IMPRESSION: No deep venous thrombosis demonstrated in the RIGHT leg.    Review of Systems   Constitutional, HEENT, cardiovascular, pulmonary, GI, , musculoskeletal, neuro, skin, endocrine and psych systems are negative, except as otherwise noted.      Objective    /82 (BP Location: Left arm, Patient Position: Sitting, Cuff Size: Adult Large)   Pulse 84   Temp 98  F (36.7  C) (Oral)   Resp 16   Ht 1.676 m (5' 6\")   Wt 113.9 kg (251 lb)   SpO2 96%   BMI 40.51 kg/m    Body mass index is 40.51 kg/m .  Physical Exam   GENERAL: healthy, alert and no distress  EYES: Eyes grossly normal to inspection, PERRL and conjunctivae and sclerae normal  HENT: ear canals and TM's normal, nose and mouth without ulcers or lesions  NECK: no adenopathy, no asymmetry, masses, or scars and thyroid normal to palpation  RESP: lungs clear to auscultation - no rales, rhonchi or wheezes  CV: regular rate and rhythm, normal S1 S2, no S3 or S4, no murmur, click or rub, no peripheral edema and peripheral pulses strong  MS: There is significant tense edema and erythema in the right foot, " calf and extending into the medial thigh.  There is some weeping in the leg.  There is a large callus formation in the right great toe.  NEURO: Normal strength and tone, mentation intact and speech normal  PSYCH: mentation appears normal, affect normal/bright

## 2023-03-10 NOTE — TELEPHONE ENCOUNTER
RECORDS STATUS - ALL OTHER DIAGNOSIS      Action    Action Taken 3/10/23  Spoke w/ pt - pet pt, last Heme/Onc provider seen was at the [facility] in 2021. Pt advised that prior to that he was being seen by Dr. Castro, and hx records from [facility] were received at that time. Pt advised no imaging done for this condition for quite a while, though pt did advise they had some cognitive issues and weren't entirely sure when most recent relevant imaging was conducted.     Pt advised prior to being a pt at [facility] pt was seen by Dr. Fonseca @ [facility]. Records are in [facility].   10:11 AM         [facility]   DATE RECEIVED:    NOTES STATUS DETAILS   OFFICE NOTE from referring provider [facility] Sherwin Mejia, DO in [facility]   OFFICE NOTE from medical oncologist [facility], [facility] - [facility]  Dr. Gallo Bunch: 2/8/22   DISCHARGE SUMMARY from hospital     DISCHARGE REPORT from the ER     OPERATIVE REPORT Epic ([facility]) [facility]/Scanned into Epic  12/19/16: Stem Cell Transplant   MEDICATION LIST [facility]    LABS     PATHOLOGY REPORTS [facility] 2/20/19, 2/13/19, 6/8/16: [facility]   ANYTHING RELATED TO DIAGNOSIS Epic 3/9/23   GENONOMIC TESTING     TYPE:     IMAGING (NEED IMAGES & REPORT)     CT SCANS     MRI     MAMMO     ULTRASOUND     PET

## 2023-03-10 NOTE — PHARMACY-ADMISSION MEDICATION HISTORY
Pharmacy Medication History  Admission medication history interview status for the 3/10/2023  admission is complete. See EPIC admission navigator for prior to admission medications     Location of Interview: Patient room  Medication history sources: Patient and Surescripts    Significant changes made to the medication list:  Adjusted acetaminophen PRN --> set schedule per patient  Adjusted capsule strength of Vraylar to reflect home dosing  Removed depakote  mg at bedtime (no fill history, pt states not taking)   Removed oxycodone 5 mg q8h PRN (patient states Percocet more recent)   Adjusted pregabalin 100 mg 4x/day --> up to 4x/day (prescribed up to 4 times daily, patient generally takes 3 doses/day)   Adjusted senna-docusate to reflect patient's home regimen   Removed zinc acetate (no directions) (patient states not taking)   Added Percocet (per patient), clopidogrel, losartan, nitroglycerin, Wellbutrin, testosterone     In the past week, patient estimated taking medication this percent of the time: greater than 90%    Additional medication history information:   Patient is a reliable historian. Did not have pain pump settings available at time of writing - will clarify with [facility] records and pharmacy will update as able. Patient states his pump was last refilled a couple of weeks PTA and next refill would be due 4/6/23.     **Addendum 3/10/23 7641: Med list updated to reflect current pain pump information provided by SERGEY Le from [facility]. Verenice Morales, PharmD    Atorvastatin 20mg filled 2/9/23 #90ds #90 - patient states taking 30 mg in interview.   Hydrochlorothiazide filled 25 mg 12/2/22 #90ds #90 - patient states dose was just increased by provider, and he had forgotten the past few days to increase the dose.   Metoprolol succinate 50 mg filled 11/30/22 #90ds #90 - patient states taking 25 mg and that losartan made up for the other 25 mg in the previous prescription for metoprolol.  "  Patient states he was instructed to take clopidogrel until his home supply runs out - estimates having 30 tablets left.   Patient states he is prescribed to take testosterone daily, but had been taking as infrequently as once per week due to shoulder injury.   Patient states he was prescribed \"about 20 supplements\" and did not know exact names/products advised by functional provider; did not add any to PTA med list.   Patient states taking Percocet; however, no SureScripts information available. Last oxycodone fill for 5 mg tablet 2/17/23 #14ds. Patient states Percocet replaced this.     Medication reconciliation completed by provider prior to medication history? No    Time spent in this activity: 40 minutes      Prior to Admission medications    Medication Sig Last Dose Taking? Auth Provider Long Term End Date   acetaminophen (TYLENOL) 500 MG tablet Take 1,000 mg by mouth daily as needed for mild pain Takes overnight Unknown at PRN Yes Unknown, Entered By History     acetaminophen (TYLENOL) 500 MG tablet Take 500 mg by mouth 2 times daily 3/10/2023 at AM Yes Reported, Patient     aspirin (ASPIRIN LOW DOSE) 81 MG tablet Take 1 tablet (81 mg) by mouth daily 3/10/2023 at AM Yes Vince Henry MD No    atorvastatin (LIPITOR) 20 MG tablet Take 1.5 tablets (30 mg) by mouth daily 3/9/2023 at PM Yes Vince Henry MD Yes    buPROPion (WELLBUTRIN XL) 150 MG 24 hr tablet Take 150 mg by mouth every morning 3/10/2023 at AM Yes Unknown, Entered By History Yes    cariprazine (VRAYLAR) 4.5 MG capsule Take 4.5 mg by mouth daily 3/9/2023 at PM Yes Unknown, Entered By History     clopidogrel (PLAVIX) 75 MG tablet Take 75 mg by mouth daily 3/10/2023 at AM Yes Unknown, Entered By History Yes    DULoxetine (CYMBALTA) 30 MG capsule Take 30 mg by mouth 2 times daily 3/10/2023 at AM Yes Reported, Patient     hydrochlorothiazide (HYDRODIURIL) 25 MG tablet Take 1.5 tablets (37.5 mg) by mouth daily 3/10/2023 at took 25 mg in AM " Yes Maribeth Ardon MD Yes    losartan (COZAAR) 50 MG tablet Take 50 mg by mouth daily 3/10/2023 at AM Yes Unknown, Entered By History Yes    medication given by implanted intrathecal pump continuous Drug # 1: Fentanyl (Sublimaze) - Conc: 2000 mcg/mL - Total Dose / 24 hours: 1561.9 mcg  Drug # 2: Bupivacaine (Marcaine)  - Conc: 20 mg/mL - Total Dose / 24 hours: 15.619 mg  Drug # 3: Morphine (Duramorph or Infumorph)  - Conc: 9 mg/mL - Total Dose / 24 hours: 7.0287 mg    Rate: 0.0325 mL/hr  Pump Reservoir Volume: 40 mL  Outside Clinic & Provider: [facility]  Last Refill Date: 2/22/2023  Next Refill Date: 4/6/2023  [facility] Alarm Date: 4/4/2023  Pump : [employer] SynchroMed II    Boluses: Up to 3 per day, 3 minute duration. Lock-out every 6 hours  Fentanyl: 99.9 mcg/dose  Bupivacaine: 0.999 mg/dose  Morphine: 0.4497 mg/dose  Yes Unknown, Entered By History     metFORMIN (GLUCOPHAGE XR) 500 MG 24 hr tablet Take 2 tablets (1,000 mg) by mouth 2 times daily (with meals) 3/10/2023 at AM Yes Maribeth Ardon MD Yes    metoprolol succinate ER (TOPROL-XL) 25 MG 24 hr tablet Take 2 tablets (50 mg) by mouth daily  Patient taking differently: Take 25 mg by mouth daily 3/10/2023 at AM Yes Vince Henry MD Yes    modafinil (PROVIGIL) 200 MG tablet Take 1.5 tablets (300 mg) by mouth daily 3/10/2023 at AM Yes Sherwin Mejia, DO     multivitamin w/minerals (THERA-VIT-M) tablet Take 1 tablet by mouth daily Men's 50+ 3/10/2023 at AM Yes Unknown, Entered By History     nitroGLYcerin (NITROSTAT) 0.4 MG sublingual tablet Place 0.4 mg under the tongue every 5 minutes as needed for chest pain For chest pain place 1 tablet under the tongue every 5 minutes for 3 doses. If symptoms persist 5 minutes after 1st dose call 911. Unknown at PRN Yes Unknown, Entered By History     oxyCODONE-acetaminophen (PERCOCET) 5-325 MG tablet Take 2 tablets by mouth 2 times daily 3/10/2023 at AM  Yes Unknown, Entered By History     pregabalin (LYRICA) 100 MG capsule Take 100 mg by mouth Take up to 4 times per day 3/10/2023 at X1 dose Yes Unknown, Entered By History No    senna-docusate (SENOKOT-S/PERICOLACE) 8.6-50 MG tablet Take 1-2 tablets by mouth 2 times daily At baseline, Take 1 tablet in the morning and 2 tablets in the evening. May take second tablet in morning if needed. 3/10/2023 at X1 tablet in AM Yes Unknown, Entered By History     SUMAtriptan (IMITREX) 50 MG tablet Take 1 tablet (50 mg) by mouth at onset of headache for migraine May repeat in 2 hours. Max 4 tablets/24 hours. More than a month at PRN Yes Maribeth Ardon MD     tamsulosin (FLOMAX) 0.4 MG capsule Take 1 capsule (0.4 mg) by mouth 2 times daily 3/10/2023 at AM Yes Vince Henry MD     testosterone (ANDROGEL/TESTIM) 50 MG/5GM (1%) topical gel Place 50 mg of testosterone onto the skin daily 3/9/2023 Yes Unknown, Entered By History Yes    amoxicillin-clavulanate (AUGMENTIN) 875-125 MG tablet Take 1 tablet by mouth 2 times daily  at not yet picked up/started  Maribeth Ardon MD     blood glucose (NO BRAND SPECIFIED) lancets standard Use to test blood sugar 1 time daily or as directed.   Vince Henry MD     blood glucose (NO BRAND SPECIFIED) test strip Use to test blood sugar 1 time daily or as directed.   Vince Henry MD     dulaglutide (TRULICITY) 0.75 MG/0.5ML pen Inject 0.75 mg Subcutaneous every 7 days  at not yet started/waiting on insurance auth  Sherwin Mejia, DO     LORazepam (ATIVAN) 1 MG tablet Take 1 tablet (1 mg) by mouth once as needed for anxiety (Prior to MRI)  at not yet taken, MRI scheduled 3/13/23  Sherwin eMjia, DO         The information provided in this note is only as accurate as the sources available at the time of update(s)   Rhoda Gordon, [place]

## 2023-03-10 NOTE — PROGRESS NOTES
RECEIVING UNIT ED HANDOFF REVIEW    ED Nurse Handoff Report was reviewed by: Ashlee Hines RN on March 10, 2023 at 3:04 PM

## 2023-03-10 NOTE — H&P
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  No systemic symptoms.      Right lower extremity cellulitis-first episode  -Symptoms started 3-4 days ago.  -Ultrasound on 3/9 did not show any right lower extremity DVT  -Was prescribed Augmentin as outpatient on 3/9 that he has not started yet  -No systemic symptoms.  Afebrile, no leukocytosis.  CRP 59  -Has numerous risk factors  -No known history of MRSA  -Start on IV cefazolin  -Elevate right lower extremity    Hyponatremia, sodium 131-likely due to HCTZ  --Hold HCTZ   -Monitor    Diabetes mellitus type 2, without long-term use of insulin, poorly controlled, hemoglobin A1c 9.3 on 2/23/2023, with complications of neuropathy  -Hyperglycemia on admission, blood sugar 242  -Hold metformin  -Start on Lantus 12 units at bedtime  -add sliding scale  -Monitor blood sugars  -Will need additional diabetes medication at discharge as blood sugars have remained uncontrolled with metformin.    Coronary artery disease, s/p NSTEMI, s/p PCI to proximal GREG 1 in 11/2021  Peripheral vascular disease  Essential hypertension  Hyperlipidemia  -Continue aspirin, Plavix, statin, losartan, metoprolol   -Hold hydrochlorothiazide    Chronic diastolic congestive heart failure with preserved EF of 55-60%, echo 8/2021  Chronic lower extremity edema-multifactorial  -Last echo was in 8/2021 that showed  "normal LVEF of 55-60%, mild concentric LVH, normal RV size and function, mildly dilated left atrium, borderline dilated ascending aorta at 3.9 cm, no significant valve disease  -Has chronic lower extremity edema which is multifactorial-due to diastolic CHF, obstructive sleep apnea, dependent edema (sleeps in a recliner and sits on desk/chair every day), chronic lymphedema  -Start on IV Lasix 20 mg twice daily  -Monitor I's/O and daily weight    AL amyloidosis s/p autologous stem cell transplant at [facility], 2016   -Stable.  No current diagnosis.  Follows up at [facility]  -LFTs with    Migraine headaches  Chronic pain syndrome  -Tylenol as needed  -Continue oxycodone-acetaminophen 2 tablets twice daily  -Continue pregabalin    Obstructive sleep apnea  -Does not use CPAP.  Reports that due to back pain, he sleeps in a recliner and is not able to use CPAP.  -Continue modafinil    Bipolar 1 disorder, manic type, mild,   -Stable  -Continue Cymbalta, Vraylar, Wellbutrin       Diet:  Moderate carbohydrate diet  DVT Prophylaxis: Low Risk/Ambulatory with no VTE prophylaxis indicated  Parker Catheter: Not present  Lines: None     Cardiac Monitoring: None  Code Status:  Full code    Clinically Significant Risk Factors Present on Admission                      # DMII: A1C = 9.3 % (Ref range: 0.0 - 5.6 %) within past 6 months    # Severe Obesity: Estimated body mass index is 40.51 kg/m  as calculated from the following:    Height as of an earlier encounter on 3/10/23: 1.676 m (5' 6\").    Weight as of an earlier encounter on 3/10/23: 113.9 kg (251 lb).           Disposition Plan      Expected Discharge Date: 03/11/2023                  Sherlyn Marcelo MD  Hospitalist Service  [facility]  Securely message with PayTouch (more info)  Text page via [facility] Paging/Directory     ______________________________________________________________________    Chief Complaint     Right leg cellulitis     History " of Present Illness   Parmjit Hernández is a 67 year old male with poorly controlled type 2 diabetes, peripheral vascular disease, neuropathy, obesity, hypertension, hyperlipidemia, coronary artery disease s/p NSTEMI with stent placement, 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    Reports his symptoms started 3 to 4 days ago.  He has noted increased pain in his right leg.  Erythema has significantly increased and is up to his medial thigh.  There is significantly increased swelling in right lower extremity.  He measured his calf circumference.  It was 20 cm and right leg, 16 cm in left leg.      He denies any fever or chills, no nausea or vomiting.  No other systemic symptoms.    He was seen in clinic on 3/9.  Ultrasound was obtained that showed no DVT in right lower extremity.  He was prescribed Augmentin that he has not started yet.    He has chronic lower extremity edema.  He has not had cellulitis before.        Past Medical History    Past Medical History:   Diagnosis Date     Acquired lymphedema 2/4/2019     Allergic state      Amyloidosis (H)     followed by hematology Dr. Romeo status post peripheral stem cell transplant     Anxiety 5/11/2016     Anxiety and depression 12/18/2013     Bipolar I disorder (H) 9/1/2015    Under care of psychiatrist [facility]- Dr Rahman doxepin 25 mg, 2 cap bedtime DULoxetine 20 mg once daily  OLANZapine 7.5 mg  1 tab bedtime      Depressive disorder 1995     EKG abnormality 4/20/2020     H/O bone marrow transplant (H)      Headache(784.0)      History of diverticulitis 1/27/2015    1/15-rxed with antibiotics      Hyperlipidemia LDL goal <100 10/31/2010     Hypertension 2007     Hypertension goal BP (blood pressure) < 140/90  10/11/2011     Marianne (H) 8/20/2015     Morbid obesity (H) 8/28/2018     Myalgia and myositis, unspecified      Narcotic dependence (H) 9/6/2016    None in about 6 months- 8/1/17     NSTEMI (non-ST elevated myocardial infarction) (H) 04/19/2020     OCD (obsessive compulsive disorder)      Other acquired absence of organ 94     Other cirrhosis of liver (H) possibly from vences  4/15/2020     Other specified viral warts      Peripheral edema 5/20/2018    Noncardiac Continue with  furosemide (LASIX) 20 MG tablet,  potassium       chloride SA (K-DUR/KLOR-CON M) 10 MEQ CR  Tab once daily  Basic metabolic panel     Polyneuropathy associated with underlying disease (H) 2/4/2019     Restless legs syndrome (RLS) 6/29/2017     Stasis dermatitis of both legs 12/31/2018     Type 2 diabetes mellitus with other specified complication (H) 7/10/2017       Past Surgical History   Past Surgical History:   Procedure Laterality Date     ABDOMEN SURGERY  2007    abdominal hernia     BACK SURGERY  8/6/2020     BIOPSY  june 2015    negative     BMT PROTOCOL      for systemic amyloidosis     BONE MARROW BIOPSY, BONE SPECIMEN, NEEDLE/TROCAR N/A 6/8/2016    Procedure: BIOPSY BONE MARROW;  Surgeon: Nathan Agrawal MD;  Location:  GI     BONE MARROW BIOPSY, BONE SPECIMEN, NEEDLE/TROCAR N/A 2/20/2019    Procedure: BIOPSY BONE MARROW;  Surgeon: Michael Raygoza MD;  Location:  GI     CHOLECYSTECTOMY  1995    lap qian     COLONOSCOPY  2012    hx polyps     ESOPHAGOSCOPY, GASTROSCOPY, DUODENOSCOPY (EGD), COMBINED N/A 5/29/2015    Procedure: COMBINED ESOPHAGOSCOPY, GASTROSCOPY, DUODENOSCOPY (EGD), BIOPSY SINGLE OR MULTIPLE;  Surgeon: John Jacob MD;  Location:  GI     HERNIA REPAIR, UMBILICAL  2006     [facility] NONSPECIFIC PROCEDURE  94    Cholecystectomy     [facility] NONSPECIFIC PROCEDURE  2000    repair deviated septum       Prior to Admission Medications   Prior to Admission Medications   Prescriptions Last Dose Informant  Patient Reported? Taking?   DULoxetine (CYMBALTA) 30 MG capsule   Yes No   Sig: Take 30 mg by mouth 2 times daily   LORazepam (ATIVAN) 1 MG tablet   No No   Sig: Take 1 tablet (1 mg) by mouth once as needed for anxiety (Prior to MRI)   NONFORMULARY  Self Yes No   Sig: by Intrathecal route continuous - + up to 4 boluses daily (100mcg each bolus usually once or 2x per day)    Managed by [facility]     Medications in Pump:  fentanyl 2000mcg/mL  Bupivacaine 20mg/mL  Morphine 5.8mg/mL     Rate:   Fentanyl 1200mcg/day  Bupivacaine 12mg/day  Morphine 4.2mg/day  Pump Last Fill Date:  09/2020   SUMAtriptan (IMITREX) 50 MG tablet  Self No No   Sig: Take 1 tablet (50 mg) by mouth at onset of headache for migraine May repeat in 2 hours. Max 4 tablets/24 hours.   Patient taking differently: Take 50 mg by mouth at onset of headache for migraine Max 4 tabs 24 hours   Zinc Acetate, Oral, (ZINC ACETATE PO)   Yes No   acetaminophen (TYLENOL) 500 MG tablet   Yes No   Sig: Take 1,000 mg by mouth every 8 hours as needed for mild pain   amoxicillin-clavulanate (AUGMENTIN) 875-125 MG tablet   No No   Sig: Take 1 tablet by mouth 2 times daily   aspirin (ASPIRIN LOW DOSE) 81 MG tablet  Self No No   Sig: Take 1 tablet (81 mg) by mouth daily   atorvastatin (LIPITOR) 20 MG tablet   No No   Sig: Take 1.5 tablets (30 mg) by mouth daily   blood glucose (NO BRAND SPECIFIED) lancets standard   No No   Sig: Use to test blood sugar 1 time daily or as directed.   blood glucose (NO BRAND SPECIFIED) test strip   No No   Sig: Use to test blood sugar 1 time daily or as directed.   cariprazine (VRAYLAR) 3 MG CAPS capsule  Self Yes No   Sig: Take 4.5 mg by mouth daily    divalproex sodium delayed-release (DEPAKOTE) 500 MG DR tablet  Self Yes No   Sig: Take 750 mg by mouth At Bedtime    dulaglutide (TRULICITY) 0.75 MG/0.5ML pen   No No   Sig: Inject 0.75 mg Subcutaneous every 7 days   hydrochlorothiazide (HYDRODIURIL) 25 MG tablet    No No   Sig: Take 1.5 tablets (37.5 mg) by mouth daily   metFORMIN (GLUCOPHAGE XR) 500 MG 24 hr tablet   Yes No   Sig: Take 2 tablets (1,000 mg) by mouth 2 times daily (with meals)   metoprolol succinate ER (TOPROL-XL) 25 MG 24 hr tablet   No No   Sig: Take 2 tablets (50 mg) by mouth daily   Patient taking differently: Take 25 mg by mouth daily   modafinil (PROVIGIL) 200 MG tablet   No No   Sig: Take 1.5 tablets (300 mg) by mouth daily   multivitamin w/minerals (THERA-VIT-M) tablet  Self Yes No   Sig: Take 1 tablet by mouth daily   oxyCODONE IR (ROXICODONE) 10 MG tablet   Yes No   Sig: Take 5 mg by mouth every 8 hours as needed for severe pain (7-10)   pregabalin (LYRICA) 100 MG capsule   No No   Sig: Take 1 capsule (100 mg) by mouth 4 times daily   senna-docusate (SENOKOT-S/PERICOLACE) 8.6-50 MG tablet  Self Yes No   Sig: Take 1 tablet by mouth every evening Take 1 tablet in the morning and 2 tablets in the evening.   tamsulosin (FLOMAX) 0.4 MG capsule  Self No No   Sig: Take 1 capsule (0.4 mg) by mouth 2 times daily      Facility-Administered Medications: None           Physical Exam   Vital Signs: Temp: 97.6  F (36.4  C) Temp src: Oral BP: (!) 144/82 Pulse: 89   Resp: 18 SpO2: 93 % O2 Device: None (Room air)    Weight: 0 lbs 0 oz    Constitutional - alert, resting in bed, appears comfortable  Head - normocephalic, atraumatic  ENT - normal eye lids and lashes, no conjunctival hyperemia, no icterus, extraocular movements are normal  CV - regular rate and rhythm, no murmurs, 1-2+ edema  Pulmonary - lungs are clear to auscultation bilaterally, no wheezing or rhonchi  GI - abdomen is soft, distended, non tender, no organomegaly  Neurological - alert and oriented, normal speech, no focal deficits  Musculoskeletal - no joint erythema or swelling, ROM is ok  Integumentary-chronic erythema in bilateral lower extremities.  Erythema is much worse in right lower extremity and extends up to medial thigh.  Right lower  extremity is more swollen than right, warm to touch and tender.  Callus on right great toe.            Medical Decision Making       75 MINUTES SPENT BY ME on the date of service doing chart review, history, exam, documentation & further activities per the note.      Data     I have personally reviewed the following data over the past 24 hrs:    9.8  \   13.6   / 250     131 (L) 93 (L) 17.2 /  242 (H)   3.7 28 0.82 \       ALT: 29 AST: 24 AP: 138 (H) TBILI: 0.4   ALB: 3.7 TOT PROTEIN: 6.6 LIPASE: N/A       Procal: N/A CRP: 59.06 (H) Lactic Acid: N/A       INR:  N/A PTT:  N/A   D-dimer:  N/A Fibrinogen:  N/A       Imaging results reviewed over the past 24 hrs:   No results found for this or any previous visit (from the past 24 hour(s)).

## 2023-03-10 NOTE — ED TRIAGE NOTES
Pt reports worsening right leg drainage. Pt has hx lymphedema. Denies any fevers or redness at site.      Triage Assessment     Row Name 03/10/23 1151       Triage Assessment (Adult)    Airway WDL WDL       Cardiac WDL    Cardiac WDL WDL       Peripheral/Neurovascular WDL    Peripheral Neurovascular WDL WDL       Cognitive/Neuro/Behavioral WDL    Cognitive/Neuro/Behavioral WDL WDL

## 2023-03-10 NOTE — ED PROVIDER NOTES
History     Chief Complaint:  Leg Pain       HPI   Parmjit Hernández is a 67 year old male with a history of hypertension, hyperlipidemia, and type II diabetes who presents for evaluation of leg pain. About five days ago the patient started to develop increasing redness, swelling, and pain to his right lower leg, and since then he has had some discharge from the area. He was seen in a clinic yesterday at which time he reports he had an ultrasound that did not show any blood clot and he was prescribed a course of Augmentin for cellulitis, which he has yet to start. Today he had increased redness and swelling to the area, prompting him to come into the ED. He has not had any measured fever or chills. He notes a longstanding history of chronic pain for which he has been taking Tylenol, Percocet, and using his pain pump but he denies severe pain to the leg. He takes 81 mg Aspirin daily but is not otherwise anticoagulated. He denies any history of blood clots.     US Lower Extremity Venous Duplex Right 3/9/23:  IMPRESSION: No deep venous thrombosis demonstrated in the RIGHT leg.    Independent Historian:   None - Patient Only    Review of External Notes:   US Lower extremity 3/9/23.      ROS:  Review of Systems   Constitutional: Negative for chills and fever.   Cardiovascular: Positive for leg swelling (bilateral legs, right greater than left).   Musculoskeletal: Positive for myalgias (right lower leg).   Skin: Positive for color change (redness, right lower leg).   All other systems reviewed and are negative.      Allergies:  Cephalexin  Liraglutide  Sulfa Drugs  Victoza     Medications:    Tylenol  Aspirin  Lipitor  Vraylar  Depakote  Trulicity  Cymbalta   Ativan  Hydrochlorothiazide  Metformin   Toprol-XL   Modafinil  Lyrica  Senna-docusate   Imitrex  Flomax     Past Medical History:    Acquired lymphedema  Amyloidosis  Anxiety  Depression  Bipolar I  disorder  Diverticulitis  Hypertension  Hyperlipidemia  NSTEMI  OCD  Cirrhosis of liver   Peripheral edema  Polyneuropathy  Restless legs syndrome    Diabetes mellitus, type II     Past Surgical History:    Abdominal hernia  Back surgery  Biopsy  Bone marrow biopsy  Cholecystectomy  Colonoscopy  EGD, combined   Umbilical hernia repair  Repair deviated septum      Family History:    Arthritis - Mother  Asthma - Father  CAD - Father and mother   Cerebrovascular disease - Mother  Depression - Father, sister, daughter, and son   Diabetes - Brother   Hypertension - Father and mother     Social History:  The patient presents to the ED alone.   Alcohol use: Occasional  Tobacco use: Negative   PCP: Sherwin Mejia     Physical Exam     Patient Vitals for the past 24 hrs:   BP Temp Temp src Pulse Resp SpO2   03/10/23 1100 (!) 144/82 97.6  F (36.4  C) Oral 89 18 93 %        Physical Exam  Constitutional: Middle age white male sitting. No respiratory distress.   HENT: No signs of trauma.   Eyes: EOM are normal. Pupils are equal, round, and reactive to light.   Neck: Normal range of motion. No JVD present. No cervical adenopathy.  Cardiovascular: Regular rhythm.  Exam reveals no gallop and no friction rub.    No murmur heard.  Pulmonary/Chest: Bilateral breath sounds normal. No wheezes, rhonchi or rales.  Abdominal: Soft. No tenderness. No rebound or guarding.   Musculoskeletal: Pain pump right posterior pelvis. Left leg trace erythema, mild edema over lower leg. Right leg significant swelling of lower leg with edema and clear drainage. No increased warmth. Strong pulses distally. No ascending lymphangitic streak.   Lymphadenopathy: No lymphadenopathy.   Neurological: Alert and oriented to person, place, and time. Normal strength. Coordination normal.   Skin: See above.       Emergency Department Course     Laboratory:  Labs Ordered and Resulted from Time of ED Arrival to Time of ED Departure   CRP INFLAMMATION -  Abnormal       Result Value    CRP Inflammation 59.06 (*)    COMPREHENSIVE METABOLIC PANEL - Abnormal    Sodium 131 (*)     Potassium 3.7      Chloride 93 (*)     Carbon Dioxide (CO2) 28      Anion Gap 10      Urea Nitrogen 17.2      Creatinine 0.82      Calcium 9.2      Glucose 242 (*)     Alkaline Phosphatase 138 (*)     AST 24      ALT 29      Protein Total 6.6      Albumin 3.7      Bilirubin Total 0.4      GFR Estimate >90     CBC WITH PLATELETS AND DIFFERENTIAL - Abnormal    WBC Count 9.8      RBC Count 4.15 (*)     Hemoglobin 13.6      Hematocrit 38.2 (*)     MCV 92      MCH 32.8      MCHC 35.6      RDW 13.5      Platelet Count 250      % Neutrophils 78      % Lymphocytes 11      % Monocytes 6      % Eosinophils 3      % Basophils 1      % Immature Granulocytes 1      NRBCs per 100 WBC 0      Absolute Neutrophils 7.7      Absolute Lymphocytes 1.1      Absolute Monocytes 0.6      Absolute Eosinophils 0.3      Absolute Basophils 0.1      Absolute Immature Granulocytes 0.1      Absolute NRBCs 0.0        Emergency Department Course & Assessments:     Interventions:  1238 Unasyn 3 g IV   1457 Lasix 10 mg IV     Assessments:  1200: The patient was seen and evaluated.     1330: I updated and reassessed the patient.     Independent Interpretation (X-rays, CTs, rhythm strip):  None    Consultations/Discussion of Management or Tests:  1337: I spoke with Dr. Marcelo of the hospitalist service regarding patient's presentation, findings, and plan of care.     Social Determinants of Health affecting care:   None    Disposition:  The patient was admitted to the hospital under the care of Dr. Marcelo.     Impression & Plan      Medical Decision Making:  Parmjit Hernández is a 67 year old male who presents with significant swelling and redness of his right leg. He has chronic lymphedema. He has been having these symptoms for a few days. He denies any new cut or skin break in that leg but now the leg is quite swollen and weeping. He  saw his primary who ordered an ultrasound which is negative and also did some blood work showing an elevated CRP. The patient denies fevers or shaking chills but his leg reveals redness and swelling from the ankle all the way up to the medial thigh. There was no increased warmth however. Labs were obtained. His white count is normal. His CRP is elevated. I have started him on Unasyn and will give him a small dose of Lasix to help with the edema. Of note, his primary did order antibiotics outpatient but the patient had not picked those up yet. The patient will be admitted to further evaluation and treatment.      Diagnosis:    ICD-10-CM    1. Cellulitis of right lower extremity  L03.115       2. Lymphedema  I89.0            Scribe Disclosure:  I, Devin Morris, am serving as a scribe at 12:09 PM on 3/10/2023 to document services personally performed by Norbert Schroeder MD based on my observations and the provider's statements to me.   3/10/2023   Norbert Schroeder MD Steinman, Randall Ira, MD  03/10/23 5052

## 2023-03-10 NOTE — ED NOTES
[facility]  ED Nurse Handoff Report    ED Chief complaint: Leg Pain      ED Diagnosis:   Final diagnoses:   Cellulitis of right lower extremity   Lymphedema       Code Status: not addressed at this time    Allergies:   Allergies   Allergen Reactions    Cephalexin Diarrhea    Liraglutide Other (See Comments), Headache and Unknown     Other reaction(s): Headache, Unknown  PN: migraines  Increased migraine frequency and severity  PN: migraines  Increased migraine frequency and severity  Increased migraine frequency and severity  Increased migraine frequency and severity  PN: migraines  Increased migraine frequency and severity      Sulfa Drugs Swelling and Angioedema     Pt has taken  Taken sulfa drugs orally without trouble. He had problems with Sulfa eye drops. Eye swelled up  Other reaction(s): Other (See Comments)  LW Reaction: eye gts only-red, puffy eye  LW Reaction: eye gts only-red, puffy eye  Eye drops  LW Reaction: eye gts only-red, puffy eye  Eye drops      Victoza      Increased migraine frequency and severity       Patient Story: Pt reports worsening right leg drainage. Pt has hx lymphedema. Denies any fevers or redness at site.   Focused Assessment:  Red/Daniel BLE. R>L. Serous drainage from both legs. Patient states he will get episodes when his legs feel cold. Patient has hx of PVD and lymphedema.     Treatments and/or interventions provided: IV labs, unasyn.  Patient's response to treatments and/or interventions: No change    To be done/followed up on inpatient unit:  continue to montior    Does this patient have any cognitive concerns?:  A/Ox4    Activity level - Baseline/Home:  Independent  Activity Level - Current:   Stand with Assist    Patient's Preferred language: English   Needed?: No    Isolation: None  Infection: Not Applicable  Patient tested for COVID 19 prior to admission: NO  Bariatric?: No    Vital Signs:   Vitals:    03/10/23 1100   BP: (!) 144/82   BP  Location: Right arm   Pulse: 89   Resp: 18   Temp: 97.6  F (36.4  C)   TempSrc: Oral   SpO2: 93%       Cardiac Rhythm:     Was the PSS-3 completed:   Yes  What interventions are required if any?               Family Comments: NA  OBS brochure/video discussed/provided to patient/family: N/A              Name of person given brochure if not patient: NA              Relationship to patient: NA    For the majority of the shift this patient's behavior was Green.   Behavioral interventions performed were None.    ED NURSE PHONE NUMBER: [phone]

## 2023-03-11 LAB
ANION GAP SERPL CALCULATED.3IONS-SCNC: 11 MMOL/L (ref 7–15)
BUN SERPL-MCNC: 20 MG/DL (ref 8–23)
CALCIUM SERPL-MCNC: 8.6 MG/DL (ref 8.8–10.2)
CHLORIDE SERPL-SCNC: 90 MMOL/L (ref 98–107)
CREAT SERPL-MCNC: 1.38 MG/DL (ref 0.67–1.17)
DEPRECATED HCO3 PLAS-SCNC: 30 MMOL/L (ref 22–29)
ERYTHROCYTE [DISTWIDTH] IN BLOOD BY AUTOMATED COUNT: 13.6 % (ref 10–15)
GFR SERPL CREATININE-BSD FRML MDRD: 56 ML/MIN/1.73M2
GLUCOSE BLDC GLUCOMTR-MCNC: 154 MG/DL (ref 70–99)
GLUCOSE BLDC GLUCOMTR-MCNC: 180 MG/DL (ref 70–99)
GLUCOSE BLDC GLUCOMTR-MCNC: 182 MG/DL (ref 70–99)
GLUCOSE BLDC GLUCOMTR-MCNC: 224 MG/DL (ref 70–99)
GLUCOSE BLDC GLUCOMTR-MCNC: 233 MG/DL (ref 70–99)
GLUCOSE BLDC GLUCOMTR-MCNC: 242 MG/DL (ref 70–99)
GLUCOSE SERPL-MCNC: 184 MG/DL (ref 70–99)
HCT VFR BLD AUTO: 36.9 % (ref 40–53)
HGB BLD-MCNC: 12.8 G/DL (ref 13.3–17.7)
MAGNESIUM SERPL-MCNC: 2.1 MG/DL (ref 1.7–2.3)
MCH RBC QN AUTO: 32.6 PG (ref 26.5–33)
MCHC RBC AUTO-ENTMCNC: 34.7 G/DL (ref 31.5–36.5)
MCV RBC AUTO: 94 FL (ref 78–100)
PLATELET # BLD AUTO: 227 10E3/UL (ref 150–450)
POTASSIUM SERPL-SCNC: 3.2 MMOL/L (ref 3.4–5.3)
RBC # BLD AUTO: 3.93 10E6/UL (ref 4.4–5.9)
SODIUM SERPL-SCNC: 131 MMOL/L (ref 136–145)
WBC # BLD AUTO: 8.8 10E3/UL (ref 4–11)

## 2023-03-11 PROCEDURE — 36415 COLL VENOUS BLD VENIPUNCTURE: CPT | Performed by: INTERNAL MEDICINE

## 2023-03-11 PROCEDURE — 258N000003 HC RX IP 258 OP 636: Performed by: INTERNAL MEDICINE

## 2023-03-11 PROCEDURE — 250N000011 HC RX IP 250 OP 636: Performed by: INTERNAL MEDICINE

## 2023-03-11 PROCEDURE — 250N000013 HC RX MED GY IP 250 OP 250 PS 637: Performed by: INTERNAL MEDICINE

## 2023-03-11 PROCEDURE — 83735 ASSAY OF MAGNESIUM: CPT | Performed by: INTERNAL MEDICINE

## 2023-03-11 PROCEDURE — 85027 COMPLETE CBC AUTOMATED: CPT | Performed by: INTERNAL MEDICINE

## 2023-03-11 PROCEDURE — 82962 GLUCOSE BLOOD TEST: CPT

## 2023-03-11 PROCEDURE — 99232 SBSQ HOSP IP/OBS MODERATE 35: CPT | Performed by: INTERNAL MEDICINE

## 2023-03-11 PROCEDURE — G0378 HOSPITAL OBSERVATION PER HR: HCPCS

## 2023-03-11 PROCEDURE — 82310 ASSAY OF CALCIUM: CPT | Performed by: INTERNAL MEDICINE

## 2023-03-11 PROCEDURE — 96376 TX/PRO/DX INJ SAME DRUG ADON: CPT

## 2023-03-11 RX ORDER — LOSARTAN POTASSIUM 50 MG/1
50 TABLET ORAL DAILY
Status: CANCELLED | OUTPATIENT
Start: 2023-03-12

## 2023-03-11 RX ORDER — LIDOCAINE 4 G/G
1-3 PATCH TOPICAL
Status: DISCONTINUED | OUTPATIENT
Start: 2023-03-11 | End: 2023-03-13

## 2023-03-11 RX ORDER — HYDROMORPHONE HYDROCHLORIDE 2 MG/1
2-4 TABLET ORAL
Status: DISCONTINUED | OUTPATIENT
Start: 2023-03-11 | End: 2023-03-12

## 2023-03-11 RX ORDER — POTASSIUM CHLORIDE 750 MG/1
10 TABLET, EXTENDED RELEASE ORAL ONCE
Status: COMPLETED | OUTPATIENT
Start: 2023-03-11 | End: 2023-03-11

## 2023-03-11 RX ADMIN — METOPROLOL SUCCINATE 25 MG: 25 TABLET, EXTENDED RELEASE ORAL at 09:35

## 2023-03-11 RX ADMIN — DULOXETINE HYDROCHLORIDE 30 MG: 30 CAPSULE, DELAYED RELEASE PELLETS ORAL at 09:36

## 2023-03-11 RX ADMIN — INSULIN ASPART 5 UNITS: 100 INJECTION, SOLUTION INTRAVENOUS; SUBCUTANEOUS at 12:05

## 2023-03-11 RX ADMIN — TAMSULOSIN HYDROCHLORIDE 0.4 MG: 0.4 CAPSULE ORAL at 20:20

## 2023-03-11 RX ADMIN — ASPIRIN 81 MG: 81 TABLET, COATED ORAL at 09:37

## 2023-03-11 RX ADMIN — HYDROMORPHONE HYDROCHLORIDE 2 MG: 2 TABLET ORAL at 11:42

## 2023-03-11 RX ADMIN — LIDOCAINE 2 PATCH: 560 PATCH PERCUTANEOUS; TOPICAL; TRANSDERMAL at 20:19

## 2023-03-11 RX ADMIN — CEFAZOLIN SODIUM 2 G: 2 INJECTION, SOLUTION INTRAVENOUS at 09:37

## 2023-03-11 RX ADMIN — SODIUM CHLORIDE 250 ML: 9 INJECTION, SOLUTION INTRAVENOUS at 11:45

## 2023-03-11 RX ADMIN — INSULIN ASPART 1 UNITS: 100 INJECTION, SOLUTION INTRAVENOUS; SUBCUTANEOUS at 17:22

## 2023-03-11 RX ADMIN — CARIPRAZINE 4.5 MG: 1.5 CAPSULE, GELATIN COATED ORAL at 20:20

## 2023-03-11 RX ADMIN — BUPROPION HYDROCHLORIDE 150 MG: 150 TABLET, FILM COATED, EXTENDED RELEASE ORAL at 09:35

## 2023-03-11 RX ADMIN — SENNOSIDES AND DOCUSATE SODIUM 2 TABLET: 50; 8.6 TABLET ORAL at 20:21

## 2023-03-11 RX ADMIN — PREGABALIN 100 MG: 100 CAPSULE ORAL at 20:20

## 2023-03-11 RX ADMIN — PREGABALIN 100 MG: 100 CAPSULE ORAL at 13:58

## 2023-03-11 RX ADMIN — CLOPIDOGREL BISULFATE 75 MG: 75 TABLET ORAL at 09:35

## 2023-03-11 RX ADMIN — ATORVASTATIN CALCIUM 30 MG: 20 TABLET, FILM COATED ORAL at 22:29

## 2023-03-11 RX ADMIN — HYDROMORPHONE HYDROCHLORIDE 2 MG: 2 TABLET ORAL at 20:45

## 2023-03-11 RX ADMIN — FUROSEMIDE 20 MG: 10 INJECTION, SOLUTION INTRAMUSCULAR; INTRAVENOUS at 04:40

## 2023-03-11 RX ADMIN — OXYCODONE HYDROCHLORIDE AND ACETAMINOPHEN 2 TABLET: 5; 325 TABLET ORAL at 20:20

## 2023-03-11 RX ADMIN — OXYCODONE HYDROCHLORIDE AND ACETAMINOPHEN 2 TABLET: 5; 325 TABLET ORAL at 09:36

## 2023-03-11 RX ADMIN — TAMSULOSIN HYDROCHLORIDE 0.4 MG: 0.4 CAPSULE ORAL at 09:36

## 2023-03-11 RX ADMIN — DULOXETINE HYDROCHLORIDE 30 MG: 30 CAPSULE, DELAYED RELEASE PELLETS ORAL at 20:22

## 2023-03-11 RX ADMIN — MODAFINIL 300 MG: 200 TABLET ORAL at 09:36

## 2023-03-11 RX ADMIN — INSULIN GLARGINE 12 UNITS: 100 INJECTION, SOLUTION SUBCUTANEOUS at 22:35

## 2023-03-11 RX ADMIN — PREGABALIN 100 MG: 100 CAPSULE ORAL at 09:36

## 2023-03-11 RX ADMIN — CEFAZOLIN SODIUM 2 G: 2 INJECTION, SOLUTION INTRAVENOUS at 17:30

## 2023-03-11 RX ADMIN — POTASSIUM CHLORIDE 10 MEQ: 750 TABLET, EXTENDED RELEASE ORAL at 11:41

## 2023-03-11 RX ADMIN — SENNOSIDES AND DOCUSATE SODIUM 2 TABLET: 50; 8.6 TABLET ORAL at 09:36

## 2023-03-11 RX ADMIN — CEFAZOLIN SODIUM 2 G: 2 INJECTION, SOLUTION INTRAVENOUS at 03:22

## 2023-03-11 RX ADMIN — INSULIN ASPART 2 UNITS: 100 INJECTION, SOLUTION INTRAVENOUS; SUBCUTANEOUS at 09:37

## 2023-03-11 ASSESSMENT — ACTIVITIES OF DAILY LIVING (ADL)
ADLS_ACUITY_SCORE: 38
ADLS_ACUITY_SCORE: 38
ADLS_ACUITY_SCORE: 37
ADLS_ACUITY_SCORE: 36
ADLS_ACUITY_SCORE: 38
ADLS_ACUITY_SCORE: 38
ADLS_ACUITY_SCORE: 36
ADLS_ACUITY_SCORE: 37
ADLS_ACUITY_SCORE: 38
ADLS_ACUITY_SCORE: 38

## 2023-03-11 NOTE — PROGRESS NOTES
diagnostic tests and consults completed and resulted  No  -vital signs normal or at patient baseline  No, BP trending down  -adequate pain control on oral analgesics  Yes  -infection is improving  Yes

## 2023-03-11 NOTE — PROGRESS NOTES
[facility]  Hospitalist Progress Note    Admit Date:  3/10/2023  Date of Service (when I saw the patient): 03/11/2023   Provider:  Meghan Davila, DO    Assessment & Plan   Parmjit Hernández is a 67 year old male who was admitted on 3/10/2023.     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        1.  Extensive Right lower extremity cellulitis  1. Symptoms started 3-4 days ago.  -Ultrasound on 3/9 did not show any right lower extremity DVT  -Was prescribed Augmentin as outpatient on 3/9 that he did not take  Tolerating IV cefazolin well  No fevers or leukocytosis but is relatively immunocompromised with past stem cell transplant and poorly controlled diabetes    Pain is still significant     2. Hyponatremia  Sodium 131 this am (131)  But was given IV lasix x 2 doses  Continue to hold PTA hydrochlorothiazide  Will encourage po intake  BMP in the am    3.   Acute renal insufficiency    Creat elevated today after IV lasix doses given  Will hold further IV diuretics  Will give a small IVF bolus of 250cc x 1 now  Continue to monitor I/O's and renal function closely    4. Diabetes mellitus type 2, poorly controlled     hemoglobin A1c 9.3 on 2/23/2023, with complications of neuropathy  -Hyperglycemia on admission, blood sugar 242  -Hold metformin d/t elevated creat  -Started on Lantus 12 units at bedtime add sliding scale insulin prn  Blood sugars 180-240's     This is a significant co-morbidity that increases risk for  infection    5. Coronary artery disease, s/p NSTEMI, s/p PCI to proximal GREG 1 in 11/2021  Peripheral vascular disease  Essential hypertension  Hyperlipidemia  -Continue aspirin, Plavix, statin, losartan, metoprolol   -Hold hydrochlorothiazide (as above)     6. Chronic diastolic congestive heart failure with preserved EF of 55-60%, echo 8/2021  Chronic lower extremity edema-multifactorial  -Last echo was in 8/2021 that showed normal LVEF of 55-60%, mild concentric LVH, normal RV size and function, mildly dilated left atrium, borderline dilated ascending aorta at 3.9 cm, no significant valve disease  -Has chronic lower extremity edema which is multifactorial-due to diastolic CHF, obstructive sleep apnea, dependent edema (sleeps in a recliner and sits on desk/chair every day), chronic lymphedema  -hold further IV lasix, as above  TEDs socks and elevation important as tolerated    7. AL amyloidosis s/p autologous stem cell transplant at [facility], 2016   -Stable.  No current diagnosis.  Follows up at [facility]     8. MEJIAS  -LFTs with     9. Migraine headaches  Chronic pain syndrome  Acute right leg pain associated with cellulitis    -Tylenol as needed  -Continue oxycodone-acetaminophen 2 tablets twice daily (this is for his chronic left shoulder pain)  -Continue pregabalin  He is requesting hydromorphone for his acute leg pain - po, prn doses ordered.  No clear indication for IV narcotics at this time    Noted to have an intrathecal pain pump  Follows with the pain team chronically    10.  Fibromyalgia  Notes that this is a new diagnosis for him from rheumatology     11. Obstructive sleep apnea  -Does not use CPAP.  Reports that due to back pain, he sleeps in a recliner and is not able to use CPAP.  -Continue modafinil     12. Bipolar 1 disorder, manic type, mild,   -Stable  -Continue Cymbalta, Vraylar, Wellbutrin    Medical Decision Making     56 MINUTES SPENT BY ME on the date of service doing chart review,  history, exam, documentation & further activities per the note.        Labs/Imaging Reviewed:  See Information above and Data section below    Diet: Moderate Consistent Carb (60 g CHO per Meal) Diet    DVT Prophylaxis: Heparin SQ  Parker Catheter: Not present  Code Status: Full Code      Disposition Plan      Expected Discharge Date: 03/13/2023        Discharge Comments: Right leg cellulitis, on Ancef     Entered: Meghan Davila DO 03/11/2023, 7:29 AM       The patient's care was discussed with the Bedside Nurse and Patient.    Interval History    Having a lot of pain in his right leg from inner thigh down to foot. Wondering if he can have some hydromorphone for this pain.  Pain in shoulder is better with percocets (home dose).  No CP, SOB, F/C, HA or N/V.  Up to the bathroom without lightheadedness or dizziness.  Reports a lot of pain in his leg when he is up walking to the bathroom.      No other new concerns per nursing      -Data reviewed today: I reviewed all new labs and imaging results over the last 24 hours. I personally reviewed no images or EKG's today.    Physical Exam   Temp: 98  F (36.7  C) Temp src: Oral BP: 97/56 Pulse: 61   Resp: 18 SpO2: 95 % O2 Device: None (Room air)    Vitals:    03/11/23 0644   Weight: 112.3 kg (247 lb 9.2 oz)     Vital Signs with Ranges  Temp:  [97.6  F (36.4  C)-98  F (36.7  C)] 98  F (36.7  C)  Pulse:  [61-89] 61  Resp:  [16-18] 18  BP: ()/(46-86) 97/56  SpO2:  [93 %-96 %] 95 %  I/O last 3 completed shifts:  In: 1080 [P.O.:1080]  Out: -     GEN:  Alert, oriented x 3, comfortable, no overt respiratory distress.  Easily conversant  HEENT:  Normocephalic/atraumatic, no scleral icterus, no nasal discharge, mouth and membranes moist  CV:  Regular rate and rhythm, no loud murmur to ausc.  S1 + S2 noted, no S3 or S4.  LUNGS:  Clear to auscultation ant/lat  bilaterally.  No rales/rhonchi/wheezing auscultated bilaterally.  No costal retractions bilaterally.    ABD:   Active bowel sounds, soft, non-tender to light palpation throughout, mildly distended throughout.  No rebound/guarding/rigidity.  No masses palpated.   EXT:  Right leg with significant erythema and warmth from toes up to the mid-inner thigh----area is not demarcated at this time.  Open, dry cuts/cracks on the first big toe (likely point of entry).  Nails thickened. . No joint synovitis noted on the right.    PSYCH:  Mood appropriate, Not tearful or depressed.  Maintains direct eye contact.  Very conversant but not necessarily with rushed speech  NEURO:  No tremors at rest, able to ambulate fairly independently with walker; slowly.  Speech is clear and appropriate.    Data   Labs:  Recent Labs   Lab 03/11/23  1203 03/11/23  0801 03/11/23  0701 03/10/23  1632 03/10/23  1238   NA  --   --  131*  --  131*   POTASSIUM  --   --  3.2*  --  3.7   CHLORIDE  --   --  90*  --  93*   CO2  --   --  30*  --  28   ANIONGAP  --   --  11  --  10   * 182* 184*   < > 242*   BUN  --   --  20.0  --  17.2   CR  --   --  1.38*  --  0.82   GFRESTIMATED  --   --  56*  --  >90   LUIS  --   --  8.6*  --  9.2    < > = values in this interval not displayed.     Recent Labs   Lab 03/11/23  0701 03/10/23  1238 03/09/23  0832   WBC 8.8 9.8 10.3   HGB 12.8* 13.6 14.7   HCT 36.9* 38.2* 40.5   MCV 94 92 92    250 249     Recent Labs   Lab 03/11/23  1203 03/11/23  0801 03/11/23  0701 03/10/23  1632 03/10/23  1238   NA  --   --  131*  --  131*   POTASSIUM  --   --  3.2*  --  3.7   CHLORIDE  --   --  90*  --  93*   CO2  --   --  30*  --  28   ANIONGAP  --   --  11  --  10   * 182* 184*   < > 242*   BUN  --   --  20.0  --  17.2   CR  --   --  1.38*  --  0.82   GFRESTIMATED  --   --  56*  --  >90   LUIS  --   --  8.6*  --  9.2   MAG  --   --  2.1  --   --    PROTTOTAL  --   --   --   --  6.6   ALBUMIN  --   --   --   --  3.7   BILITOTAL  --   --   --   --  0.4   ALKPHOS  --   --   --   --  138*   AST  --   --   --   --  24   ALT  --    --   --   --  29    < > = values in this interval not displayed.     No results for input(s): COLOR, APPEARANCE, URINEGLC, URINEBILI, URINEKETONE, SG, UBLD, URINEPH, PROTEIN, UROBILINOGEN, NITRITE, LEUKEST, RBCU, WBCU in the last 168 hours.   Recent Imaging:   No results found for this or any previous visit (from the past 24 hour(s)).    Medications     medication given by implanted intrathecal pump         aspirin  81 mg Oral Daily     atorvastatin  30 mg Oral Daily     buPROPion  150 mg Oral QAM     cariprazine  4.5 mg Oral Daily     ceFAZolin  2 g Intravenous Q8H     clopidogrel  75 mg Oral Daily     DULoxetine  30 mg Oral BID     furosemide  20 mg Intravenous Q12H     insulin aspart  1-10 Units Subcutaneous TID AC     insulin aspart  1-7 Units Subcutaneous At Bedtime     insulin glargine  12 Units Subcutaneous At Bedtime     losartan  50 mg Oral Daily     metoprolol succinate ER  25 mg Oral Daily     modafinil  300 mg Oral Daily     oxyCODONE-acetaminophen  2 tablet Oral BID     pregabalin  100 mg Oral TID     senna-docusate  1-2 tablet Oral BID     tamsulosin  0.4 mg Oral BID

## 2023-03-11 NOTE — PROGRESS NOTES
Shift: [id number]  Orientation/Cognitive: AOx4  Observation Goals (Met/ Not Met): partially met, see notes below.  Mobility Level/Assist Equipment: SBA cane  Fall Risk (Y/N): yes  Behavior Concerns: calm and cooperative  Pain Management: generalized pain 3/10, controlled with scheduled pain meds and intrathecal pump.  Tele/VS/O2: vitally stable and on room air.  ABNL Lab/BG: CBC c PC and BMP - pending collection.  Diet: moderate consistent carb diet.  Bowel/Bladder: continent BB  Skin Concerns: cellulitis and swelling on right lower leg - right leg elevated.  Drains/Devices: PIV SL  Tests/Procedures for next shift: none  Anticipated DC date & active delays: TBD      Observation Goals:    -diagnostic tests and consults completed and resulted - in progress.    -vital signs normal or at patient baseline - vitally stable and on room air.    -adequate pain control on oral analgesics - pain controlled with scheduled medicine, patient with intrathetal pump.    -infection is improving - right leg swelling and inflamed, patient still on Cefazolin IV.

## 2023-03-11 NOTE — PROGRESS NOTES
diagnostic tests and consults completed and resulted  No  -vital signs normal or at patient baseline   Yes, BP improving  -adequate pain control on oral analgesics  Yes  -infection is improving  Yes

## 2023-03-11 NOTE — PLAN OF CARE
Goal Outcome Evaluation:    3/10/23  3p to 11p    Orientation: A&O x 4 forgetful    Vitals/Tele: VSS on RA, very tender RLE    IV Access/drains: HEYDI ASENCIO    Diet: Mod carb, BG checks    Mobility: Independent with cane    GI/: Intake output, continent ambulates to the BR    Wound/Skin: RLE extremity redness    Consults: Hospitalist following    Discharge Plan: TBD      See Flow sheets for assessment

## 2023-03-11 NOTE — PROVIDER NOTIFICATION
MD Notification    Notified Person: MD    Notified Person Name: Noemi Medina    Notification Date/Time: 3/11/2023  7:52 AM    Notification Interaction: Text paged    Purpose of Notification: Need K+ replacement protocol    Orders Received:    Comments:

## 2023-03-11 NOTE — PROGRESS NOTES
Observation Goals:    -diagnostic tests and consults completed and resulted - in progress.    -vital signs normal or at patient baseline - vitally stable and on room air.    -adequate pain control on oral analgesics - pain controlled with scheduled medicine, patient with intrathetal pump.    -infection is improving - right leg swelling and inflamed, patient still on Cefazolin IV.

## 2023-03-12 PROBLEM — L03.115 CELLULITIS OF RIGHT LOWER EXTREMITY: Status: ACTIVE | Noted: 2023-03-12

## 2023-03-12 PROBLEM — I89.0 LYMPHEDEMA: Status: ACTIVE | Noted: 2023-03-12

## 2023-03-12 LAB
ANION GAP SERPL CALCULATED.3IONS-SCNC: 7 MMOL/L (ref 7–15)
BUN SERPL-MCNC: 18.1 MG/DL (ref 8–23)
CALCIUM SERPL-MCNC: 8.7 MG/DL (ref 8.8–10.2)
CHLORIDE SERPL-SCNC: 95 MMOL/L (ref 98–107)
CREAT SERPL-MCNC: 0.99 MG/DL (ref 0.67–1.17)
DEPRECATED HCO3 PLAS-SCNC: 32 MMOL/L (ref 22–29)
GFR SERPL CREATININE-BSD FRML MDRD: 84 ML/MIN/1.73M2
GLUCOSE BLDC GLUCOMTR-MCNC: 176 MG/DL (ref 70–99)
GLUCOSE BLDC GLUCOMTR-MCNC: 213 MG/DL (ref 70–99)
GLUCOSE BLDC GLUCOMTR-MCNC: 220 MG/DL (ref 70–99)
GLUCOSE BLDC GLUCOMTR-MCNC: 235 MG/DL (ref 70–99)
GLUCOSE BLDC GLUCOMTR-MCNC: 270 MG/DL (ref 70–99)
GLUCOSE SERPL-MCNC: 251 MG/DL (ref 70–99)
POTASSIUM SERPL-SCNC: 3.3 MMOL/L (ref 3.4–5.3)
POTASSIUM SERPL-SCNC: 4.2 MMOL/L (ref 3.4–5.3)
SODIUM SERPL-SCNC: 134 MMOL/L (ref 136–145)

## 2023-03-12 PROCEDURE — 87493 C DIFF AMPLIFIED PROBE: CPT | Performed by: INTERNAL MEDICINE

## 2023-03-12 PROCEDURE — 250N000013 HC RX MED GY IP 250 OP 250 PS 637: Performed by: INTERNAL MEDICINE

## 2023-03-12 PROCEDURE — 84132 ASSAY OF SERUM POTASSIUM: CPT | Performed by: INTERNAL MEDICINE

## 2023-03-12 PROCEDURE — 80048 BASIC METABOLIC PNL TOTAL CA: CPT | Performed by: INTERNAL MEDICINE

## 2023-03-12 PROCEDURE — 96376 TX/PRO/DX INJ SAME DRUG ADON: CPT

## 2023-03-12 PROCEDURE — 120N000001 HC R&B MED SURG/OB

## 2023-03-12 PROCEDURE — 36415 COLL VENOUS BLD VENIPUNCTURE: CPT | Performed by: INTERNAL MEDICINE

## 2023-03-12 PROCEDURE — 99233 SBSQ HOSP IP/OBS HIGH 50: CPT | Performed by: INTERNAL MEDICINE

## 2023-03-12 PROCEDURE — G0378 HOSPITAL OBSERVATION PER HR: HCPCS

## 2023-03-12 PROCEDURE — 82962 GLUCOSE BLOOD TEST: CPT

## 2023-03-12 PROCEDURE — 250N000011 HC RX IP 250 OP 636: Performed by: INTERNAL MEDICINE

## 2023-03-12 RX ORDER — HYDROXYZINE HYDROCHLORIDE 25 MG/1
50 TABLET, FILM COATED ORAL EVERY 6 HOURS PRN
Status: DISCONTINUED | OUTPATIENT
Start: 2023-03-12 | End: 2023-03-15 | Stop reason: HOSPADM

## 2023-03-12 RX ORDER — HYDROMORPHONE HYDROCHLORIDE 2 MG/1
4-6 TABLET ORAL EVERY 4 HOURS PRN
Status: DISCONTINUED | OUTPATIENT
Start: 2023-03-12 | End: 2023-03-15

## 2023-03-12 RX ORDER — LACTOBACILLUS RHAMNOSUS GG 10B CELL
1 CAPSULE ORAL 2 TIMES DAILY
Status: DISCONTINUED | OUTPATIENT
Start: 2023-03-12 | End: 2023-03-15 | Stop reason: HOSPADM

## 2023-03-12 RX ORDER — POTASSIUM CHLORIDE 1500 MG/1
40 TABLET, EXTENDED RELEASE ORAL ONCE
Status: COMPLETED | OUTPATIENT
Start: 2023-03-12 | End: 2023-03-12

## 2023-03-12 RX ORDER — HYDROXYZINE HYDROCHLORIDE 25 MG/1
25 TABLET, FILM COATED ORAL EVERY 6 HOURS PRN
Status: DISCONTINUED | OUTPATIENT
Start: 2023-03-12 | End: 2023-03-15 | Stop reason: HOSPADM

## 2023-03-12 RX ORDER — HYDROMORPHONE HYDROCHLORIDE 2 MG/1
4-6 TABLET ORAL EVERY 4 HOURS PRN
Status: DISCONTINUED | OUTPATIENT
Start: 2023-03-12 | End: 2023-03-12

## 2023-03-12 RX ORDER — HYDROMORPHONE HYDROCHLORIDE 2 MG/1
4 TABLET ORAL
Status: DISCONTINUED | OUTPATIENT
Start: 2023-03-12 | End: 2023-03-12

## 2023-03-12 RX ADMIN — METOPROLOL SUCCINATE 25 MG: 25 TABLET, EXTENDED RELEASE ORAL at 08:22

## 2023-03-12 RX ADMIN — PREGABALIN 100 MG: 100 CAPSULE ORAL at 13:52

## 2023-03-12 RX ADMIN — INSULIN GLARGINE 12 UNITS: 100 INJECTION, SOLUTION SUBCUTANEOUS at 22:06

## 2023-03-12 RX ADMIN — CEFAZOLIN SODIUM 2 G: 2 INJECTION, SOLUTION INTRAVENOUS at 03:40

## 2023-03-12 RX ADMIN — Medication 1 CAPSULE: at 20:12

## 2023-03-12 RX ADMIN — HYDROMORPHONE HYDROCHLORIDE 4 MG: 2 TABLET ORAL at 09:30

## 2023-03-12 RX ADMIN — CARIPRAZINE 4.5 MG: 1.5 CAPSULE, GELATIN COATED ORAL at 20:11

## 2023-03-12 RX ADMIN — HYDROMORPHONE HYDROCHLORIDE 4 MG: 2 TABLET ORAL at 13:52

## 2023-03-12 RX ADMIN — INSULIN ASPART 4 UNITS: 100 INJECTION, SOLUTION INTRAVENOUS; SUBCUTANEOUS at 12:17

## 2023-03-12 RX ADMIN — DULOXETINE HYDROCHLORIDE 30 MG: 30 CAPSULE, DELAYED RELEASE PELLETS ORAL at 20:12

## 2023-03-12 RX ADMIN — CLOPIDOGREL BISULFATE 75 MG: 75 TABLET ORAL at 08:20

## 2023-03-12 RX ADMIN — ASPIRIN 81 MG: 81 TABLET, COATED ORAL at 08:21

## 2023-03-12 RX ADMIN — MODAFINIL 300 MG: 200 TABLET ORAL at 08:21

## 2023-03-12 RX ADMIN — CEFAZOLIN SODIUM 2 G: 2 INJECTION, SOLUTION INTRAVENOUS at 18:00

## 2023-03-12 RX ADMIN — HYDROMORPHONE HYDROCHLORIDE 6 MG: 2 TABLET ORAL at 22:03

## 2023-03-12 RX ADMIN — DULOXETINE HYDROCHLORIDE 30 MG: 30 CAPSULE, DELAYED RELEASE PELLETS ORAL at 08:20

## 2023-03-12 RX ADMIN — CEFAZOLIN SODIUM 2 G: 2 INJECTION, SOLUTION INTRAVENOUS at 09:30

## 2023-03-12 RX ADMIN — OXYCODONE HYDROCHLORIDE AND ACETAMINOPHEN 2 TABLET: 5; 325 TABLET ORAL at 20:11

## 2023-03-12 RX ADMIN — ATORVASTATIN CALCIUM 30 MG: 20 TABLET, FILM COATED ORAL at 22:03

## 2023-03-12 RX ADMIN — OXYCODONE HYDROCHLORIDE AND ACETAMINOPHEN 2 TABLET: 5; 325 TABLET ORAL at 08:20

## 2023-03-12 RX ADMIN — PREGABALIN 100 MG: 100 CAPSULE ORAL at 20:11

## 2023-03-12 RX ADMIN — TAMSULOSIN HYDROCHLORIDE 0.4 MG: 0.4 CAPSULE ORAL at 08:21

## 2023-03-12 RX ADMIN — POTASSIUM CHLORIDE 40 MEQ: 1500 TABLET, EXTENDED RELEASE ORAL at 09:30

## 2023-03-12 RX ADMIN — BUPROPION HYDROCHLORIDE 150 MG: 150 TABLET, FILM COATED, EXTENDED RELEASE ORAL at 08:20

## 2023-03-12 RX ADMIN — INSULIN ASPART 3 UNITS: 100 INJECTION, SOLUTION INTRAVENOUS; SUBCUTANEOUS at 16:44

## 2023-03-12 RX ADMIN — HYDROMORPHONE HYDROCHLORIDE 4 MG: 2 TABLET ORAL at 03:40

## 2023-03-12 RX ADMIN — TAMSULOSIN HYDROCHLORIDE 0.4 MG: 0.4 CAPSULE ORAL at 20:12

## 2023-03-12 RX ADMIN — Medication 1 MG: at 22:03

## 2023-03-12 RX ADMIN — PREGABALIN 100 MG: 100 CAPSULE ORAL at 08:21

## 2023-03-12 RX ADMIN — INSULIN ASPART 6 UNITS: 100 INJECTION, SOLUTION INTRAVENOUS; SUBCUTANEOUS at 08:22

## 2023-03-12 RX ADMIN — HYDROMORPHONE HYDROCHLORIDE 6 MG: 2 TABLET ORAL at 17:58

## 2023-03-12 ASSESSMENT — ACTIVITIES OF DAILY LIVING (ADL)
ADLS_ACUITY_SCORE: 38
ADLS_ACUITY_SCORE: 38
ADLS_ACUITY_SCORE: 37
ADLS_ACUITY_SCORE: 37
ADLS_ACUITY_SCORE: 38
ADLS_ACUITY_SCORE: 37
ADLS_ACUITY_SCORE: 38
ADLS_ACUITY_SCORE: 37
ADLS_ACUITY_SCORE: 38

## 2023-03-12 NOTE — PLAN OF CARE
Goal Outcome Evaluation:           diagnostic tests and consults completed and resulted  No  -vital signs normal or at patient baseline   Yes, BP improving  -adequate pain control on oral analgesics  Yes  -infection is improving  Yes         Orientation/Cognitive: A&OX4  Observation Goals (Met/ Not Met):  Not met  Mobility Level/Assist Equipment: SBA with a cane  Fall Risk (Y/N):  Yes  Behavior Concerns: None  Pain Management: Percocet, Dilaudid, lyrica and intrathecal pump  Tele/VS/O2:  VSS on RA, no tele Did get a 250 mL bolus of NS for low BPs  ABNL Lab/BG:  Crt elevated at 1.38, losartan and lasix on hold, Hgb of 12.8, K+ of 3.2, replaced per MD order, BG in 100s-200s  Diet:  Mod carb  Bowel/Bladder: Continent, no BM  Skin Concerns:  RLE cellulitis, improving per pt report, elevated on wedge foam   Drains/Devices:  PIV Sled  Tests/Procedures for next shift:  Continue BG monitoring  Anticipated DC date & active delays: pending clinical improvement  Patient Stated Goal for Today:  pain management

## 2023-03-12 NOTE — PLAN OF CARE
Goal Outcome Evaluation:    A&Ox4. VSS on RA ex hypertensive on admission to floor. C/o chronic pain to L shoulder & RLE, controlled with internal pain pump R hip, scheduled meds & PRN hydromorphone. LS clear. RLE 3+ edema & red/rosaura, redness outline. R toe scabbed. Extremity elevated. Voiding adequately, multiple loose BM's today, MD notified & need c-dif sample. PIV SL, int abx. Tolerating mod carb diet, BG checks w/ meals. Up SBA w/ cane, steady. Discharge pending improvement.

## 2023-03-12 NOTE — PROVIDER NOTIFICATION
MD Notification    Notified Person: MD    Notified Person Name: Dr. Davila    Notification Date/Time: 16:39, 3/12/23    Notification Interaction: [employer] messaging    Purpose of Notification:      Patient just arrived to unit and frustrated with pain management. Would like to talk with you regarding making some changes, do you have time to call into the room or come see the patient?         Orders Received:    Comments:

## 2023-03-12 NOTE — PROGRESS NOTES
[facility]  Hospitalist Progress Note    Admit Date:  3/10/2023  Date of Service (when I saw the patient): 03/12/2023   Provider:  Meghan Davila, DO    Assessment & Plan   Parmjit Hernández is a 67 year old male who was admitted on 3/10/2023.     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        1.  Extensive Right lower extremity cellulitis   Symptoms started 4-5 days ago.  -Ultrasound on 3/9 did not show any right lower extremity DVT  -Was prescribed Augmentin as outpatient on 3/9 that he did not take  Tolerating IV cefazolin well  Clinically is showing slow improvement with less pain and less erythema this am on exam  No fevers or leukocytosis but is relatively immunocompromised with past stem cell transplant and poorly controlled diabetes    Plan to continue IV cefazolin today  If continues to have clinical improvement, then may be able to switch to po antibiotics in the next day or so     2. Hyponatremia      Hypokalemia  Sodium improved to 134 this am  Was given 250cc NS bolus x 1 3/11/23   Continue to hold PTA hydrochlorothiazide  Will encourage po intake    Potassium low again today  Was given one po dose of potassium 3/11/23 cautiously with elevated creat  Will order   BMP in the am    3.   Acute renal insufficiency - resolved    Creat normalized this am after small NS bolus given 3/11/23  Creat bumped up after several doses of IV lasix on admission  Will hold further IV diuretics    4. Diabetes mellitus type 2,  poorly controlled     hemoglobin A1c 9.3 on 2/23/2023, with complications of neuropathy  -Hyperglycemia on admission, blood sugar 242  -Hold metformin d/t elevated creat  -Started on Lantus 12 units at bedtime add sliding scale insulin prn  Blood sugars 180-240's     DM is a significant co-morbidity that increases risk for infection    5. Coronary artery disease, s/p NSTEMI, s/p PCI to proximal GREG 1 in 11/2021  Peripheral vascular disease  Essential hypertension  Hyperlipidemia  -Continue aspirin, Plavix, statin, losartan, metoprolol   -Hold hydrochlorothiazide (as above)     6. Chronic diastolic congestive heart failure with preserved EF of 55-60%, echo 8/2021  Chronic lower extremity edema-multifactorial  -Last echo was in 8/2021 that showed normal LVEF of 55-60%, mild concentric LVH, normal RV size and function, mildly dilated left atrium, borderline dilated ascending aorta at 3.9 cm, no significant valve disease  -Has chronic lower extremity edema which is multifactorial-due to diastolic CHF, obstructive sleep apnea, dependent edema (sleeps in a recliner and sits on desk/chair every day), chronic lymphedema  -hold further IV lasix, as above  TEDs socks and elevation important as tolerated and for the long-term    7. AL amyloidosis s/p autologous stem cell transplant at [facility], 2016   -Stable.  No current diagnosis.  Follows up at [facility]     8. MEJIAS  -LFTs with     9. Migraine headaches  Chronic pain syndrome  Acute right leg pain associated with cellulitis    -Tylenol as needed  -Continue oxycodone-acetaminophen 2 tablets twice daily (this is for his chronic left shoulder pain)  -Continue pregabalin    He is requesting hydromorphone for his acute leg pain - po, prn doses ordered.  No clear indication for IV narcotics at this time    Noted to have an intrathecal pain pump  Follows with the pain team chronically    10.  Fibromyalgia  Notes that this is a new diagnosis for him from rheumatology     11.  "Obstructive sleep apnea  -Does not use CPAP.  Reports that due to back pain, he sleeps in a recliner and is not able to use CPAP.  -Continue modafinil     12. Bipolar 1 disorder, manic type, mild,   -Stable  -Continue Cymbalta, Vraylar, Wellbutrin    Medical Decision Making     55  Minutes spent by me on the date of service doing chart review, history, exam, documentation  & further activities per the note    56 MINUTES SPENT BY ME on the date of service doing chart review, history, exam, documentation & further activities per the note.        Labs/Imaging Reviewed:  See Information above and Data section below    Diet: Moderate Consistent Carb (60 g CHO per Meal) Diet    DVT Prophylaxis: Heparin SQ  Parker Catheter: Not present  Code Status: Full Code      Disposition Plan      Expected Discharge Date: 03/13/2023        Discharge Comments: Right leg cellulitis, on Ancef  K+ 3.2, replacing     Entered: Meghan Davila, DO 03/12/2023, 7:19 AM     The patient's care was discussed with the Bedside Nurse and Patient.    Interval History     Still reporting a lot of right leg pain  Today but is \"better\" than yesterday.  Also thinks that the swelling is maybe a bit better this am. Up to the bathroom.  Feels that he is urinating well.  No diarrhea.  No HA, CP, SOB or N/V.  Tired.      -Data reviewed today: I reviewed all new labs and imaging results over the last 24 hours. I personally reviewed no images or EKG's today.    Physical Exam   Temp: 97.5  F (36.4  C) Temp src: Oral BP: 117/66 Pulse: 65   Resp: 17 SpO2: 97 % O2 Device: None (Room air)    Vitals:    03/11/23 0644 03/12/23 0514   Weight: 112.3 kg (247 lb 9.2 oz) 113.4 kg (250 lb)     Vital Signs with Ranges  Temp:  [97.2  F (36.2  C)-98  F (36.7  C)] 97.5  F (36.4  C)  Pulse:  [63-79] 65  Resp:  [16-18] 17  BP: ()/(60-69) 117/66  SpO2:  [92 %-97 %] 97 %  I/O last 3 completed shifts:  In: 1050 [P.O.:1050]  Out: 775 [Urine:775]    GEN:  Alert, oriented " x 3, appears fatigued but still easily conversant  HEENT:  Normocephalic/atraumatic, no scleral icterus, no nasal discharge, mouth and membranes moist  CV:  Regular rate and rhythm, no loud murmur to ausc.  S1 + S2 noted, no S3 or S4.  LUNGS:  Clear to auscultation ant/lat bilaterally.  No rales/rhonchi/wheezing auscultated bilaterally.  No costal retractions bilaterally.    ABD:  Active bowel sounds, soft, non-tender to light palpation throughout, mildly distended throughout.  No rebound/guarding/rigidity.  No masses palpated.   EXT:  Right leg with significant erythema and warmth from toes up to the mid-inner thigh----area is still not demarcated at this time but does appear, to me, to be slightly less erythematous on exam. Still warm to the touch and edematous from knee down.  Min erythema on the right thigh compared to exam on 3/11/23.  Open, dry cuts/cracks on the first big toe (likely point of entry).  Nails thickened. .     PSYCH:  Mood appropriate, Not tearful or depressed.  Maintains direct eye contact. Conversant and speech is appropriate  NEURO:  No tremors at rest    Data   Labs:  Recent Labs   Lab 03/12/23  0616 03/12/23  0155 03/11/23  2234 03/11/23  0801 03/11/23  0701 03/10/23  1632 03/10/23  1238   *  --   --   --  131*  --  131*   POTASSIUM 3.3*  --   --   --  3.2*  --  3.7   CHLORIDE 95*  --   --   --  90*  --  93*   CO2 32*  --   --   --  30*  --  28   ANIONGAP 7  --   --   --  11  --  10   * 220* 233*   < > 184*   < > 242*   BUN 18.1  --   --   --  20.0  --  17.2   CR 0.99  --   --   --  1.38*  --  0.82   GFRESTIMATED 84  --   --   --  56*  --  >90   LUIS 8.7*  --   --   --  8.6*  --  9.2    < > = values in this interval not displayed.     Recent Labs   Lab 03/11/23  0701 03/10/23  1238 03/09/23  0832   WBC 8.8 9.8 10.3   HGB 12.8* 13.6 14.7   HCT 36.9* 38.2* 40.5   MCV 94 92 92    250 249     Recent Labs   Lab 03/12/23  0616 03/12/23  0155 03/11/23  2234 03/11/23  0801  03/11/23  0701 03/10/23  1632 03/10/23  1238   *  --   --   --  131*  --  131*   POTASSIUM 3.3*  --   --   --  3.2*  --  3.7   CHLORIDE 95*  --   --   --  90*  --  93*   CO2 32*  --   --   --  30*  --  28   ANIONGAP 7  --   --   --  11  --  10   * 220* 233*   < > 184*   < > 242*   BUN 18.1  --   --   --  20.0  --  17.2   CR 0.99  --   --   --  1.38*  --  0.82   GFRESTIMATED 84  --   --   --  56*  --  >90   LUIS 8.7*  --   --   --  8.6*  --  9.2   MAG  --   --   --   --  2.1  --   --    PROTTOTAL  --   --   --   --   --   --  6.6   ALBUMIN  --   --   --   --   --   --  3.7   BILITOTAL  --   --   --   --   --   --  0.4   ALKPHOS  --   --   --   --   --   --  138*   AST  --   --   --   --   --   --  24   ALT  --   --   --   --   --   --  29    < > = values in this interval not displayed.     No results for input(s): COLOR, APPEARANCE, URINEGLC, URINEBILI, URINEKETONE, SG, UBLD, URINEPH, PROTEIN, UROBILINOGEN, NITRITE, LEUKEST, RBCU, WBCU in the last 168 hours.   Recent Imaging:   No results found for this or any previous visit (from the past 24 hour(s)).    Medications     medication given by implanted intrathecal pump         aspirin  81 mg Oral Daily     atorvastatin  30 mg Oral Daily     buPROPion  150 mg Oral QAM     cariprazine  4.5 mg Oral Daily     ceFAZolin  2 g Intravenous Q8H     clopidogrel  75 mg Oral Daily     DULoxetine  30 mg Oral BID     insulin aspart  1-10 Units Subcutaneous TID AC     insulin aspart  1-7 Units Subcutaneous At Bedtime     insulin glargine  12 Units Subcutaneous At Bedtime     lidocaine  1-3 patch Transdermal Q24h    And     lidocaine   Transdermal Q8H ALPHONSE     [Held by provider] losartan  50 mg Oral Daily     metoprolol succinate ER  25 mg Oral Daily     modafinil  300 mg Oral Daily     oxyCODONE-acetaminophen  2 tablet Oral BID     pregabalin  100 mg Oral TID     senna-docusate  1-2 tablet Oral BID     sodium chloride (PF)  3 mL Intracatheter Q8H     tamsulosin  0.4 mg  Oral BID

## 2023-03-12 NOTE — PROGRESS NOTES
diagnostic tests and consults completed and resulted  No  -vital signs normal or at patient baseline   Yes  -adequate pain control on oral analgesics  Yes  -infection is improving  Yes

## 2023-03-12 NOTE — PROGRESS NOTES
Observation Goals:    -diagnostic tests and consults completed and resulted - in progress.    -vital signs normal or at patient baseline - vitally stable and on room air.    -adequate pain control on oral analgesics - complains of pain 9/10 on RLE.    -infection is improving - still with redness and swelling on RLE, and painful per patient, still on Cefazolin IV.

## 2023-03-12 NOTE — PROGRESS NOTES
Shift: [id number]  Orientation/Cognitive: AOx4  Observation Goals (Met/ Not Met): partially met, see notes below.  Mobility Level/Assist Equipment: SBA cane  Fall Risk (Y/N): yes  Behavior Concerns: calm and cooperative  Pain Management: complains of pain mostly on right lower leg. pls see notes below.  Tele/VS/O2: vitally stable and on room air.  ABNL Lab/BG: BMP - pending collection.  Diet: moderate carb diet.  Bowel/Bladder: continent BB; had 1 BM yesterday night and at 2 medium formed BM on 3/12/23.  Skin Concerns: right leg swelling and tenderness - cellulitis  Drains/Devices: PIV SL  Tests/Procedures for next shift: none  Anticipated DC date & active delays: TBD      Observation Goals:    -diagnostic tests and consults completed and resulted - in progress.    -vital signs normal or at patient baseline - vitally stable and on room air.    -adequate pain control on oral analgesics - right leg pain mostly stays between 8-9/10 - gave Dilaudid 4mg, patient asleep.    -infection is improving - still with redness and swelling on RLE, and painful per patient, still on Cefazolin IV.

## 2023-03-12 NOTE — PLAN OF CARE
Goal Outcome Evaluation:  Orientation/Cognitive: A7OX4  Observation Goals (Met/ Not Met):  NA, inpt  Mobility Level/Assist Equipment:  SBA with a cane  Fall Risk (Y/N):  Yes  Behavior Concerns:  None  Pain Management:  PO dilaudid and scheduled pain meds  Tele/VS/O2:  VSS on RA, no tele  ABNL Lab/BG:  K+ low, replaced, recheck normal, BG in the 200s  Diet:  Mod carb   Bowel/Bladder: Continent,  frequent BMs, please hold laxatives  Skin Concerns:  RLE cellulitis, cracks in the R big toe  Drains/Devices:  PIV Sled  Tests/Procedures for next shift:  Continue BG monitoring  Anticipated DC date & active delays: pending clinical progress  Patient Stated Goal for Today: Pain managements  Report given the Gen surg RN, all belongings returned to pt, stable at time of transfer.

## 2023-03-12 NOTE — UTILIZATION REVIEW
Admission Status; Secondary Review Determination       Under the authority of the Utilization Management Committee, the utilization review process indicated a secondary review on the above patient. The review outcome is based on review of the medical records, discussions with staff, and applying clinical experience noted on the date of the review.     (x) Inpatient Status Appropriate - This patient's medical care is consistent with medical management for inpatient care and reasonable inpatient medical practice.     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  Patient is at risk for developing complications from the cellulitis because of peripheral vascular disease, peripheral neuropathy and because of uncontrolled diabetes mellitus.  The patient requires IV antibiotics cefazolin 2 g every 8 hours.    He still has a lot of pain.  Patient also developed electrolytes abnormalities hyponatremia hypokalemia and increased creatinine.  The patient needs to be monitored closely for the extensive cellulitis, he needs to have blood sugar under control to help with infection.    The expected length of stay at the time of admission was more than 2 nights because of the severity of illness, intensity of service provided, and risk for adverse outcome. Inpatient admission is appropriate.     Dr Davila  notified     This document was produced using voice recognition software       The information on this document is developed by the utilization review team in order for the business office to ensure compliance. This only denotes the appropriateness of proper admission status and does not reflect the quality of care rendered.   The definitions of Inpatient Status and Observation Status used in making the determination above are those provided in the CMS Coverage Manual, Chapter 1 and Chapter 6, section 70.4.   Sincerely,   JUAN GEORGE MD   Utilization Review  Physician Advisor  [facility]

## 2023-03-12 NOTE — PROGRESS NOTES
Observation Goals:    -diagnostic tests and consults completed and resulted - in progress.    -vital signs normal or at patient baseline - vitally stable and on room air.    -adequate pain control on oral analgesics - right leg pain still at 9/10 per patient .    -infection is improving - still with redness and swelling on RLE, and painful per patient, still on Cefazolin IV.

## 2023-03-13 ENCOUNTER — APPOINTMENT (OUTPATIENT)
Dept: GENERAL RADIOLOGY | Facility: CLINIC | Age: 68
DRG: 603 | End: 2023-03-13
Payer: COMMERCIAL

## 2023-03-13 ENCOUNTER — APPOINTMENT (OUTPATIENT)
Dept: CT IMAGING | Facility: CLINIC | Age: 68
DRG: 603 | End: 2023-03-13
Attending: PHYSICIAN ASSISTANT
Payer: COMMERCIAL

## 2023-03-13 LAB
ALBUMIN UR-MCNC: NEGATIVE MG/DL
APPEARANCE UR: CLEAR
BILIRUB UR QL STRIP: NEGATIVE
C DIFF TOX B STL QL: NEGATIVE
COLOR UR AUTO: ABNORMAL
GLUCOSE BLDC GLUCOMTR-MCNC: 121 MG/DL (ref 70–99)
GLUCOSE BLDC GLUCOMTR-MCNC: 142 MG/DL (ref 70–99)
GLUCOSE BLDC GLUCOMTR-MCNC: 157 MG/DL (ref 70–99)
GLUCOSE BLDC GLUCOMTR-MCNC: 164 MG/DL (ref 70–99)
GLUCOSE BLDC GLUCOMTR-MCNC: 172 MG/DL (ref 70–99)
GLUCOSE BLDC GLUCOMTR-MCNC: 205 MG/DL (ref 70–99)
GLUCOSE UR STRIP-MCNC: 70 MG/DL
HGB UR QL STRIP: NEGATIVE
KETONES UR STRIP-MCNC: NEGATIVE MG/DL
LEUKOCYTE ESTERASE UR QL STRIP: NEGATIVE
NITRATE UR QL: NEGATIVE
PH UR STRIP: 6 [PH] (ref 5–7)
POTASSIUM SERPL-SCNC: 4.1 MMOL/L (ref 3.4–5.3)
SP GR UR STRIP: 1.01 (ref 1–1.03)
UROBILINOGEN UR STRIP-MCNC: NORMAL MG/DL

## 2023-03-13 PROCEDURE — 99233 SBSQ HOSP IP/OBS HIGH 50: CPT | Performed by: INTERNAL MEDICINE

## 2023-03-13 PROCEDURE — 36415 COLL VENOUS BLD VENIPUNCTURE: CPT | Performed by: INTERNAL MEDICINE

## 2023-03-13 PROCEDURE — 120N000001 HC R&B MED SURG/OB

## 2023-03-13 PROCEDURE — 99418 PROLNG IP/OBS E/M EA 15 MIN: CPT | Performed by: NURSE PRACTITIONER

## 2023-03-13 PROCEDURE — 81003 URINALYSIS AUTO W/O SCOPE: CPT

## 2023-03-13 PROCEDURE — 250N000011 HC RX IP 250 OP 636: Performed by: INTERNAL MEDICINE

## 2023-03-13 PROCEDURE — 250N000013 HC RX MED GY IP 250 OP 250 PS 637: Performed by: INTERNAL MEDICINE

## 2023-03-13 PROCEDURE — 73200 CT UPPER EXTREMITY W/O DYE: CPT | Mod: LT

## 2023-03-13 PROCEDURE — 99223 1ST HOSP IP/OBS HIGH 75: CPT | Performed by: NURSE PRACTITIONER

## 2023-03-13 PROCEDURE — 84132 ASSAY OF SERUM POTASSIUM: CPT | Performed by: INTERNAL MEDICINE

## 2023-03-13 PROCEDURE — 73030 X-RAY EXAM OF SHOULDER: CPT | Mod: LT

## 2023-03-13 PROCEDURE — 250N000013 HC RX MED GY IP 250 OP 250 PS 637: Performed by: NURSE PRACTITIONER

## 2023-03-13 PROCEDURE — 250N000009 HC RX 250: Performed by: NURSE PRACTITIONER

## 2023-03-13 RX ORDER — HYDROMORPHONE HYDROCHLORIDE 2 MG/1
4 TABLET ORAL
Status: CANCELLED | OUTPATIENT
Start: 2023-03-13

## 2023-03-13 RX ORDER — METFORMIN HCL 500 MG
500 TABLET, EXTENDED RELEASE 24 HR ORAL 2 TIMES DAILY WITH MEALS
Status: DISCONTINUED | OUTPATIENT
Start: 2023-03-13 | End: 2023-03-15 | Stop reason: HOSPADM

## 2023-03-13 RX ORDER — ACETAMINOPHEN 325 MG/1
650 TABLET ORAL EVERY 6 HOURS
Status: DISCONTINUED | OUTPATIENT
Start: 2023-03-13 | End: 2023-03-15 | Stop reason: HOSPADM

## 2023-03-13 RX ORDER — BISACODYL 10 MG
10 SUPPOSITORY, RECTAL RECTAL DAILY PRN
Status: DISCONTINUED | OUTPATIENT
Start: 2023-03-13 | End: 2023-03-15 | Stop reason: HOSPADM

## 2023-03-13 RX ORDER — LIDOCAINE 50 MG/G
OINTMENT TOPICAL 4 TIMES DAILY
Status: DISCONTINUED | OUTPATIENT
Start: 2023-03-13 | End: 2023-03-15 | Stop reason: HOSPADM

## 2023-03-13 RX ORDER — HEPARIN SODIUM 5000 [USP'U]/.5ML
5000 INJECTION, SOLUTION INTRAVENOUS; SUBCUTANEOUS EVERY 12 HOURS
Status: DISCONTINUED | OUTPATIENT
Start: 2023-03-13 | End: 2023-03-15 | Stop reason: HOSPADM

## 2023-03-13 RX ADMIN — METFORMIN ER 500 MG 500 MG: 500 TABLET ORAL at 09:27

## 2023-03-13 RX ADMIN — METFORMIN ER 500 MG 500 MG: 500 TABLET ORAL at 18:12

## 2023-03-13 RX ADMIN — SENNOSIDES AND DOCUSATE SODIUM 2 TABLET: 50; 8.6 TABLET ORAL at 09:28

## 2023-03-13 RX ADMIN — INSULIN ASPART 1 UNITS: 100 INJECTION, SOLUTION INTRAVENOUS; SUBCUTANEOUS at 14:23

## 2023-03-13 RX ADMIN — METOPROLOL SUCCINATE 25 MG: 25 TABLET, EXTENDED RELEASE ORAL at 09:26

## 2023-03-13 RX ADMIN — SENNOSIDES AND DOCUSATE SODIUM 2 TABLET: 50; 8.6 TABLET ORAL at 21:17

## 2023-03-13 RX ADMIN — TAMSULOSIN HYDROCHLORIDE 0.4 MG: 0.4 CAPSULE ORAL at 09:28

## 2023-03-13 RX ADMIN — LIDOCAINE: 50 OINTMENT TOPICAL at 18:12

## 2023-03-13 RX ADMIN — ASPIRIN 81 MG: 81 TABLET, COATED ORAL at 09:26

## 2023-03-13 RX ADMIN — LIDOCAINE: 50 OINTMENT TOPICAL at 21:21

## 2023-03-13 RX ADMIN — CEFAZOLIN SODIUM 2 G: 2 INJECTION, SOLUTION INTRAVENOUS at 11:14

## 2023-03-13 RX ADMIN — HYDROMORPHONE HYDROCHLORIDE 6 MG: 2 TABLET ORAL at 21:17

## 2023-03-13 RX ADMIN — HEPARIN SODIUM 5000 UNITS: 5000 INJECTION, SOLUTION INTRAVENOUS; SUBCUTANEOUS at 21:18

## 2023-03-13 RX ADMIN — DULOXETINE HYDROCHLORIDE 30 MG: 30 CAPSULE, DELAYED RELEASE PELLETS ORAL at 21:18

## 2023-03-13 RX ADMIN — TAMSULOSIN HYDROCHLORIDE 0.4 MG: 0.4 CAPSULE ORAL at 21:18

## 2023-03-13 RX ADMIN — DULOXETINE HYDROCHLORIDE 30 MG: 30 CAPSULE, DELAYED RELEASE PELLETS ORAL at 09:28

## 2023-03-13 RX ADMIN — ACETAMINOPHEN 650 MG: 325 TABLET ORAL at 18:12

## 2023-03-13 RX ADMIN — MODAFINIL 300 MG: 200 TABLET ORAL at 09:27

## 2023-03-13 RX ADMIN — CARIPRAZINE 4.5 MG: 1.5 CAPSULE, GELATIN COATED ORAL at 21:17

## 2023-03-13 RX ADMIN — PREGABALIN 100 MG: 100 CAPSULE ORAL at 09:26

## 2023-03-13 RX ADMIN — BUPROPION HYDROCHLORIDE 150 MG: 150 TABLET, FILM COATED, EXTENDED RELEASE ORAL at 09:27

## 2023-03-13 RX ADMIN — PREGABALIN 100 MG: 100 CAPSULE ORAL at 14:23

## 2023-03-13 RX ADMIN — HYDROMORPHONE HYDROCHLORIDE 6 MG: 2 TABLET ORAL at 02:11

## 2023-03-13 RX ADMIN — INSULIN GLARGINE 12 UNITS: 100 INJECTION, SOLUTION SUBCUTANEOUS at 21:59

## 2023-03-13 RX ADMIN — LOSARTAN POTASSIUM 50 MG: 50 TABLET, FILM COATED ORAL at 09:27

## 2023-03-13 RX ADMIN — PREGABALIN 100 MG: 100 CAPSULE ORAL at 21:17

## 2023-03-13 RX ADMIN — HYDROMORPHONE HYDROCHLORIDE 6 MG: 2 TABLET ORAL at 09:27

## 2023-03-13 RX ADMIN — Medication 1 CAPSULE: at 21:17

## 2023-03-13 RX ADMIN — Medication 1 CAPSULE: at 09:27

## 2023-03-13 RX ADMIN — CLOPIDOGREL BISULFATE 75 MG: 75 TABLET ORAL at 09:29

## 2023-03-13 RX ADMIN — OXYCODONE HYDROCHLORIDE AND ACETAMINOPHEN 2 TABLET: 5; 325 TABLET ORAL at 09:28

## 2023-03-13 RX ADMIN — CEFAZOLIN SODIUM 2 G: 2 INJECTION, SOLUTION INTRAVENOUS at 18:12

## 2023-03-13 RX ADMIN — CEFAZOLIN SODIUM 2 G: 2 INJECTION, SOLUTION INTRAVENOUS at 02:12

## 2023-03-13 RX ADMIN — ATORVASTATIN CALCIUM 30 MG: 20 TABLET, FILM COATED ORAL at 21:17

## 2023-03-13 RX ADMIN — INSULIN ASPART 1 UNITS: 100 INJECTION, SOLUTION INTRAVENOUS; SUBCUTANEOUS at 18:56

## 2023-03-13 ASSESSMENT — ACTIVITIES OF DAILY LIVING (ADL)
ADLS_ACUITY_SCORE: 37
ADLS_ACUITY_SCORE: 39
ADLS_ACUITY_SCORE: 39
ADLS_ACUITY_SCORE: 37
ADLS_ACUITY_SCORE: 39
ADLS_ACUITY_SCORE: 38
ADLS_ACUITY_SCORE: 39
ADLS_ACUITY_SCORE: 37
ADLS_ACUITY_SCORE: 37

## 2023-03-13 NOTE — PROGRESS NOTES
Medicine Progress Note - Hospitalist Service    Date of Admission:  3/10/2023    Assessment & Plan   68 y/o male with DM presents with RLE cellulitis and acute on chronic pain.    RLE Cellulitis:  -  Improving, keep RLE elevated when not up.  US negative for DVT.  Cellulitis lines from prior show area improving.  Continue IV cefazolin today.  If more improvement consider moving to oral 1-2 days.  Would do an extended course of at least 14 days from initial abx.     Fall in bathroom AM 3/13  Recent L humerus fracture:  -  Reportedly fell asleep on commode and leaned sideways partially falling.  He then lowered himself to ground.  He denies hitting head or falling full force.  He was assessed by the house team.  No definitive acute new injury.  He has chronic left shoulder issues with a hum fx earlier this winter.  Given this fx, x-ray obtained.  X-ray difficult to interpret with prior fx + fall.  He is moving arm fairly well as prior.  Will ask orthopedics to consult.  -  Fall precautions room    Hyponatremia  Hypokalemia  KWABENA, likely dehydration - resolved:  -  Likely partly hydrochlorothiazide related.  Sodium improved, consider resuming hydrochlorothiazide tomorrow. Will recheck BMP in AM.  Hold hydrochlorothiazide for now.  -  Replacing K per protocol    Diabetes mellitus type 2, poorly controlled baseline:    hemoglobin A1c 9.3 on 2/23/2023, with complications of neuropathy  -Resume metformin 3/13  -Continue Lantus 12 units at bedtime and sliding scale insulin prn    Coronary artery disease, s/p NSTEMI, s/p PCI to proximal GREG 1 in 11/2021  Peripheral vascular disease  Essential hypertension  Hyperlipidemia  -Continue aspirin, Plavix, statin, losartan, metoprolol   -Hold hydrochlorothiazide (as above).  -  BP variable from low 100's to 160's systolic lately.  I believe partly pain related swings.  Hold on aggressively increasing meds unless consistently high.  May resume  hydrochlorothiazide tomorrow depending on f/u BMP.    Chronic diastolic congestive heart failure with preserved EF of 55-60%, echo 8/2021  Chronic lower extremity edema-multifactorial  -Last echo was in 8/2021 that showed normal LVEF of 55-60%, mild concentric LVH, normal RV size and function, mildly dilated left atrium, borderline dilated ascending aorta at 3.9 cm, no significant valve disease  -Has chronic lower extremity edema which is multifactorial-due to diastolic CHF, obstructive sleep apnea, dependent edema (sleeps in a recliner and sits on desk/chair every day), chronic lymphedema  -hold further IV lasix, as above  TEDs socks and elevation important as tolerated and for the long-term     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, 2016   -Stable.  No current diagnosis.  Follows up at [facility]     MEJIAS  -Chronic issue, outpt follow-up     Migraine headaches  Fibromyalgia  Acute on chronic pain syndrome with cellulitis:  -  Patient with pain pump and chronic pain issues.  Currently on combination of acute pain oral meds + pain pump.  I will ask pain team to see him.   -Tylenol as needed  -Continue oxycodone-acetaminophen 2 tablets twice daily (this is for his chronic left shoulder pain)  -Continue pregabalin  -Dilaudid PRN oral       Obstructive sleep apnea  -Does not use CPAP.  Reports that due to back pain, he sleeps in a recliner and is not able to use CPAP.  -Continue modafinil     Bipolar 1 disorder, manic type, mild,   -Stable  -Continue Cymbalta, Vraylar, Wellbutrin    Medical Decision Making       50 MINUTES SPENT BY ME on the date of service doing chart review, history, exam, documentation & further activities per the note.       Diet: Moderate Consistent Carb (60 g CHO per Meal) Diet    DVT Prophylaxis: Heparin SQ  Parker Catheter: Not present  Lines: None     Cardiac Monitoring: None  Code Status: Full Code      Clinically Significant Risk Factors        # Hypokalemia:  "Lowest K = 3.3 mmol/L in last 2 days, will replace as needed                # DMII: A1C = 9.3 % (Ref range: 0.0 - 5.6 %) within past 6 months, PRESENT ON ADMISSION  # Severe Obesity: Estimated body mass index is 40.35 kg/m  as calculated from the following:    Height as of an earlier encounter on 3/10/23: 1.676 m (5' 6\").    Weight as of this encounter: 113.4 kg (250 lb)., PRESENT ON ADMISSION         Disposition Plan      Expected Discharge Date: 03/14/2023        Discharge Comments: Right leg cellulitis, on Ancef  K+ 3.2, replacing          Dashawn Franklin,   Hospitalist Service  [facility]  Securely message with TPACK (more info)  Text page via [facility] Paging/Directory   ______________________________________________________________________    Interval History   Feeling better, thinks leg looks better.  Pain still an issue though in RLE.  He doesn't think injured himself earlier in bathroom.  No chest pain, sob, nausea, other new complaints.    Physical Exam   Vital Signs: Temp: 97.6  F (36.4  C) Temp src: Oral BP: (!) 168/100 Pulse: 85   Resp: 18 SpO2: 99 % O2 Device: None (Room air)    Weight: 250 lbs .03 oz    GEN:  Alert, oriented x 3, appears comfortable, no overt distress.  HEENT:  Normocephalic/atraumatic, no scleral icterus, no nasal discharge, mouth moist.  CV:  Regular rate and rhythm, no loud murmur or rub.  LUNGS:  Clear to auscultation bilaterally without rales/rhonchi/wheezing/retractions.  Symmetric chest rise on inhalation noted.  ABD:  Active bowel sounds, soft, non-tender/non-distended.  No rebound/guarding/rigidity.  EXT:   +2 RLE edema. Erythema appears improved from prior marks.    Medications     medication given by implanted intrathecal pump         acetaminophen  650 mg Oral Q6H     aspirin  81 mg Oral Daily     atorvastatin  30 mg Oral Daily     buPROPion  150 mg Oral QAM     cariprazine  4.5 mg Oral Daily     ceFAZolin  2 g Intravenous Q8H     clopidogrel  75 " mg Oral Daily     DULoxetine  30 mg Oral BID     insulin aspart  1-10 Units Subcutaneous TID AC     insulin aspart  1-7 Units Subcutaneous At Bedtime     insulin glargine  12 Units Subcutaneous At Bedtime     lactobacillus rhamnosus (GG)  1 capsule Oral BID     lidocaine   Topical 4x Daily     losartan  50 mg Oral Daily     metFORMIN  500 mg Oral BID w/meals     metoprolol succinate ER  25 mg Oral Daily     modafinil  300 mg Oral Daily     pregabalin  100 mg Oral TID     senna-docusate  1-2 tablet Oral BID     sodium chloride (PF)  3 mL Intracatheter Q8H     tamsulosin  0.4 mg Oral BID       Data     Labs and Imaging results below reviewed today.  Recent Labs   Lab 03/11/23  0701 03/10/23  1238 03/09/23  0832   WBC 8.8 9.8 10.3   HGB 12.8* 13.6 14.7   HCT 36.9* 38.2* 40.5   MCV 94 92 92    250 249     7-Day Micro Results     Collected Updated Procedure Result Status      03/12/2023 2143 03/13/2023 0628 C. difficile Toxin B PCR with reflex to C. difficile Antigen and Toxins A/B EIA [[id number]]    Stool from Per Rectum    Final result Component Value   C Difficile Toxin B by PCR Negative   A negative result does not exclude actual disease due to C. difficile and may be due to improper collection, handling and storage of the specimen or the number of organisms in the specimen is below the detection limit of the assay.                Recent Labs   Lab 03/13/23  1716 03/13/23  1422 03/13/23  1227 03/13/23  1053 03/12/23  1629 03/12/23  1433 03/12/23  0805 03/12/23  0616 03/11/23  0801 03/11/23  0701 03/10/23  1632 03/10/23  1238   NA  --   --   --   --   --   --   --  134*  --  131*  --  131*   POTASSIUM  --   --   --  4.1  --  4.2  --  3.3*  --  3.2*  --  3.7   CHLORIDE  --   --   --   --   --   --   --  95*  --  90*  --  93*   CO2  --   --   --   --   --   --   --  32*  --  30*  --  28   ANIONGAP  --   --   --   --   --   --   --  7  --  11  --  10   * 164* 205*  --    < >  --    < > 251*   < > 184*    < > 242*   BUN  --   --   --   --   --   --   --  18.1  --  20.0  --  17.2   CR  --   --   --   --   --   --   --  0.99  --  1.38*  --  0.82   GFRESTIMATED  --   --   --   --   --   --   --  84  --  56*  --  >90   LUIS  --   --   --   --   --   --   --  8.7*  --  8.6*  --  9.2   MAG  --   --   --   --   --   --   --   --   --  2.1  --   --    PROTTOTAL  --   --   --   --   --   --   --   --   --   --   --  6.6   ALBUMIN  --   --   --   --   --   --   --   --   --   --   --  3.7   BILITOTAL  --   --   --   --   --   --   --   --   --   --   --  0.4   ALKPHOS  --   --   --   --   --   --   --   --   --   --   --  138*   AST  --   --   --   --   --   --   --   --   --   --   --  24   ALT  --   --   --   --   --   --   --   --   --   --   --  29    < > = values in this interval not displayed.       Recent Results (from the past 24 hour(s))   XR Shoulder Left 2 Views    Narrative    SHOULDER LEFT TWO VIEWS 3/13/2023 10:26 AM     HISTORY: Status post fall with left shoulder impact, history of  proximal humerus fracture with posttraumatic capsulitis.    COMPARISON: None.       Impression    IMPRESSION: There is a proximal humerus fracture which is impacted and  may be an acute on chronic injury as one of the views shows a faint  horizontal lucency through the surgical neck. A dedicated shoulder  series or shoulder CT is recommended for further evaluation, given the  appearance and the patient's history of prior fracture in the past. No  dislocation.     LAZ BAZAN MD         SYSTEM ID:  [id number]

## 2023-03-13 NOTE — CONSULTS
[facility] ACUTE PAIN SERVICE CONSULTATION     Date of Admission:  3/10/2023  Date of Consult (When I saw the patient): 03/13/23     Assessment/Plan:     Parmjit Hernández is a 67 year old male who was admitted on 3/10/2023.  Pain team was asked to see the patient for acute on chronic pain, has intrathecal pain pump. Admitted for RLE cellulitis. 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  Describes pain as 7/10 and aching, sharp, sore. The patient denies nausea, vomiting, constipation, diarrhea, chest pain, shortness of breath. The patient does not smoke and denies chemical dependency history.     Opioid Induced Respiratory Depression Risk Assessment:?high   (Low 0-1; Moderate 2-4; or High >4 or >/= 3 if two of the risk factors are age > 60 and opioid naive) due to the following risk factors: NORMA, COPD/Asthma/pulmonary disease, CHF, renal dysfunction, hepatic dysfunction, Obesity, Smoker, Age>60, >2 opioid therapies, concomitant CNS depressants, opioid naive status, or post surgical ?     PLAN:   1) Pain is consistent with RLE cellulitis. Labs and imaging indicated: I have personally reviewed pertinent notes, labs, tests, and radiologic imaging in patient's chart. Treatment plan includes multimodal pain approach, Hospital Medicine Service for medical management, antibiotics. Patient educated regarding multimodal pain approach, medications as listed below, reviewed discharge medications, advised keeping track of doses, educated on tapering off as pain improves, watch for constipation and stool softeners, no driving or alcohol with opioids,  store medications in a safe place, advised to take no more than the prescribed dose as increased doses may cause respiratory depression or death. Patient is understanding of the plan. All questions and concerns addressed to patient's satisfaction.   2)Multimodal Medication Therapy  Topical: Lidocaine patch x 3 - discontinue, order lidocaine ointment qid   NSAID'S: On Plavix, daily ASA  Steroids: none  Muscle Relaxants: none  Adjuvants: hydroxyzine prn, APAP prn, Lyrica 100 mg TID (home med)  Antidepressants/anxiolytics: Wellbutrin 150 mg qAM, Cymbalta 30 mg BID, Cariprazine 4.5 mg daily, Modafinil daily    Opioids: dilaudid 4-6 mg prn - taper off as pain improves, Percocet 5-325 mg 2 tabs bid - discontinue when on dilaudid, Intrathecal pain pump   Drug # 1: Fentanyl (Sublimaze) - Conc: 2000 mcg/mL - Total Dose / 24 hours: 1561.9 mcg   Drug # 2: Bupivacaine (Marcaine)  - Conc: 20 mg/mL - Total Dose / 24 hours: 15.619 mg   Drug # 3: Morphine (Duramorph or Infumorph)  - Conc: 9 mg/mL - Total Dose / 24 hours: 7.0287 mg   Provider: Dr. Pawan Shaw, [facility] Last Refill Date: 2/22/2023 Next Refill Date: 4/6/2023 Low [place] Alarm Date: 4/4/2023 Pump : Medtronic SynchroMed II Boluses: Up to 3 per day, 3 minute duration. Lock-out every 6 hours Fentanyl: 99.9 mcg/dose Bupivacaine: 0.999 mg/dose Morphine: 0.4497 mg/dose  IV Pain medication: none  3)Non-medication interventions: wound care, rest, ice vs heat  4)Constipation Prophylaxis: Scheduled senna    -[place] and [place]  pulled from system on 3/13/23. This indicates   3/3/23 Lorazepam 1 mg #1 tab Sherwin Mcdermott, Do  38/2/23 Percocet 5-325 mg #60 by Nathaniel Rangel  2/23/23 Modafinil 200 mg #45 by Sherwin Mcdermott, Do  12/30/22 Oxycodone 5 mg #42 by Keith Mcdaniel MD  1/31/23 Oxycodone 10 mg #28 by Bernie Hernandez  1/12/23 Oxycodone 10 mg #42 by Bernie Hernandez  8/11/22 Lyrica 100 mg #120 for 30 days by Keith Mcdaniel MD  Discharge  Recommendations - We recommend prescribing the following at the time of discharge: TBD     History of Present Illness (HPI):       Parmjit Hernández is a 67 year old male who presented for right lower extremity cellulitis.  Past medical history as above. The pain is reported to be acute RLE pain, chronic back, abdomen and shoulder pain. Current pain is rated at 7/10 and goal is 0-2/10.      Per [place]  review, the patient does have an opioid tolerance. Opioid induced side effects noted and include: sleep apnea.      Reviewed medical record, labs, imaging, ED note, and care everywhere.     Past pain treatments have included: pain clinic, intrathecal pain pump, injections, neuromodulation, APAP, PT, OT, [facility], Oxycodone, Lyrica, Percocet     Visit/Communication Style   Visit/Communication Style   Virtual (Video) communication was used to evaluate David Parnell consented to the use of video communication.  Video START time: 1330, 3/13/2023  Video STOP time: 1427, 3/13/2023   Patient's location: [facility] GENERAL SURGERY  Provider's location during the visit: [facility]              Medical History   PAST MEDICAL HISTORY:   Past Medical History:   Diagnosis Date     Acquired lymphedema 2/4/2019     Allergic state      Amyloidosis (H)     followed by hematology Dr. Romeo status post peripheral stem cell transplant     Anxiety 5/11/2016     Anxiety and depression 12/18/2013     Bipolar I disorder (H) 9/1/2015    Under care of psychiatrist P- Dr Rahman doxepin 25 mg, 2 cap bedtime DULoxetine 20 mg once daily  OLANZapine 7.5 mg  1 tab bedtime      Depressive disorder 1995     EKG abnormality 4/20/2020     H/O bone marrow transplant (H)      Headache(784.0)      History of diverticulitis 1/27/2015    1/15-rxed with antibiotics      Hyperlipidemia LDL goal <100 10/31/2010     Hypertension 2007     Hypertension goal BP (blood pressure) < 140/90 10/11/2011     Marianne (H) 8/20/2015      Morbid obesity (H) 8/28/2018     Myalgia and myositis, unspecified      Narcotic dependence (H) 9/6/2016    None in about 6 months- 8/1/17     NSTEMI (non-ST elevated myocardial infarction) (H) 04/19/2020     OCD (obsessive compulsive disorder)      Other acquired absence of organ 94     Other cirrhosis of liver (H) possibly from vences  4/15/2020     Other specified viral warts      Peripheral edema 5/20/2018    Noncardiac Continue with  furosemide (LASIX) 20 MG tablet,  potassium       chloride SA (K-DUR/KLOR-CON M) 10 MEQ CR  Tab once daily  Basic metabolic panel     Polyneuropathy associated with underlying disease (H) 2/4/2019     Restless legs syndrome (RLS) 6/29/2017     Stasis dermatitis of both legs 12/31/2018     Type 2 diabetes mellitus with other specified complication (H) 7/10/2017       PAST SURGICAL HISTORY:   Past Surgical History:   Procedure Laterality Date     ABDOMEN SURGERY  2007    abdominal hernia     BACK SURGERY  8/6/2020     BIOPSY  june 2015    negative     BMT PROTOCOL      for systemic amyloidosis     BONE MARROW BIOPSY, BONE SPECIMEN, NEEDLE/TROCAR N/A 6/8/2016    Procedure: BIOPSY BONE MARROW;  Surgeon: Nathan Agrawal MD;  Location:  GI     BONE MARROW BIOPSY, BONE SPECIMEN, NEEDLE/TROCAR N/A 2/20/2019    Procedure: BIOPSY BONE MARROW;  Surgeon: Michael Raygoza MD;  Location:  GI     CHOLECYSTECTOMY  1995    lap qian     COLONOSCOPY  2012    hx polyps     ESOPHAGOSCOPY, GASTROSCOPY, DUODENOSCOPY (EGD), COMBINED N/A 5/29/2015    Procedure: COMBINED ESOPHAGOSCOPY, GASTROSCOPY, DUODENOSCOPY (EGD), BIOPSY SINGLE OR MULTIPLE;  Surgeon: John Jacob MD;  Location:  GI     HERNIA REPAIR, UMBILICAL  2006     [facility] NONSPECIFIC PROCEDURE  94    Cholecystectomy     [facility] NONSPECIFIC PROCEDURE  2000    repair deviated septum       FAMILY HISTORY:   Family History   Problem Relation Age of Onset     Cerebrovascular Disease Mother      C.A.D. Mother      Hypertension  Mother      Alcohol/Drug Mother      Arthritis Mother      Cerebrovascular Disease Maternal Grandmother      C.A.D. Maternal Grandmother      Alcohol/Drug Maternal Grandmother      C.A.D. Father      Heart Disease Father      Hypertension Father      Alcohol/Drug Father      Allergies Father      Circulatory Father      Depression Father      Respiratory Father      Asthma Father      Alcohol/Drug Paternal Grandfather      Cerebrovascular Disease Paternal Grandfather      Depression Son      Psychotic Disorder Son         anxiety disorder     Depression Daughter      Cerebrovascular Disease Maternal Grandfather      Cerebrovascular Disease Paternal Grandmother      Diabetes Brother      Allergies Sister      Depression Sister      Gynecology Sister        SOCIAL HISTORY:   Social History     Tobacco Use     Smoking status: Never     Smokeless tobacco: Never   Substance Use Topics     Alcohol use: Not Currently     Alcohol/week: 0.0 standard drinks        HEALTH & LIFESTYLE PRACTICES  Tobacco:  reports that he has never smoked. He has never used smokeless tobacco.  Alcohol:  reports that he does not currently use alcohol.  Illicit drugs:  reports that he does not currently use drugs after having used the following drugs: Cocaine, Marijuana, Methamphetamines, Opiates, IV, Amphetamines, Barbiturates, Benzodiazepines, Codeine, Fentanyl, Hashish, Hydrocodone, Hydromorphone, LSD, and Oxycodone.    Allergies  Allergies   Allergen Reactions     Cephalexin Diarrhea     Liraglutide Other (See Comments), Headache and Unknown     Other reaction(s): Headache, Unknown  PN: migraines  Increased migraine frequency and severity  PN: migraines  Increased migraine frequency and severity  Increased migraine frequency and severity  Increased migraine frequency and severity  PN: migraines  Increased migraine frequency and severity       Sulfa Drugs Swelling and Angioedema     Pt has taken  Taken sulfa drugs orally without trouble. He had  problems with Sulfa eye drops. Eye swelled up  Other reaction(s): Other (See Comments)  LW Reaction: eye gts only-red, puffy eye  LW Reaction: eye gts only-red, puffy eye  Eye drops  LW Reaction: eye gts only-red, puffy eye  Eye drops       Victoza      Increased migraine frequency and severity       Problem List  Patient Active Problem List    Diagnosis Date Noted     Lymphedema 03/12/2023     Priority: Medium     Cellulitis of right lower extremity 03/12/2023     Priority: Medium     Cirrhosis of liver without ascites, unspecified hepatic cirrhosis type (H) 02/23/2023     Priority: Medium     Peripheral vascular disease (H) 02/08/2021     Priority: Medium     Shortness of breath 12/05/2020     Priority: Medium     Chest pain, unspecified type 12/05/2020     Priority: Medium     Hyperkalemia 12/03/2020     Priority: Medium     Hypotension, unspecified hypotension type 12/03/2020     Priority: Medium     NSTEMI (non-ST elevated myocardial infarction) (H) 11/12/2020     Priority: Medium     Generalized muscle weakness 10/20/2020     Priority: Medium     Bilateral leg weakness 10/20/2020     Priority: Medium     Thrombocytopenia, unspecified (H) 08/03/2020     Priority: Medium     Bilateral leg edema 06/29/2020     Priority: Medium     Onychomycosis 06/29/2020     Priority: Medium     EKG abnormality 04/20/2020     Priority: Medium     Low blood pressure reading 05/16/2019     Priority: Medium     Acquired lymphedema 02/04/2019     Priority: Medium     Venous stasis dermatitis of both lower extremities 12/31/2018     Priority: Medium     Morbid obesity (H) 08/28/2018     Priority: Medium     Peripheral edema 05/20/2018     Priority: Medium     Noncardiac  Continue with  furosemide (LASIX) 20 MG tablet,   potassium       chloride SA (K-DUR/KLOR-CON M) 10 MEQ CR  Tab once daily   Basic metabolic panel       Obstructive sleep apnea syndrome 06/29/2017     Priority: Medium     sleep study       Restless legs syndrome  (RLS) 06/29/2017     Priority: Medium     History of peripheral stem cell transplant (H) 12/16/2016     Priority: Medium     Insomnia due to medical condition 08/23/2016     Priority: Medium     Other amyloidosis (H) 06/01/2016     Priority: Medium      #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 7/3/2018      Dr [id number]       Chronic abdominal pain 03/15/2016     Priority: Medium     Bipolar I disorder (H) 09/01/2015     Priority: Medium     Under care of psychiatrist [facility]- Dr Rahman  doxepin 25 mg, 2 cap bedtime  DULoxetine 20 mg once daily   OLANZapine 7.5 mg  1 tab bedtime        MENTAL HEALTH 08/17/2015     Priority: Medium     History of diverticulitis 01/27/2015     Priority: Medium     1/15-rxed with antibiotics       Anxiety and depression 12/18/2013     Priority: Medium     Advanced directives, counseling/discussion 05/29/2012     Priority: Medium     Discussed advance care planning with patient; information given to patient to review. 5/29/2012     Honoring choices letter mailed. 7-  Lynsey Bustamante, RT (R)         Migraine 05/29/2012     Priority: Medium     Hypertension goal BP (blood pressure) < 140/90 10/11/2011     Priority: Medium     Type 2 diabetes mellitus without complication (H) 10/11/2011     Priority: Medium     Hyperlipidemia LDL goal <100 10/31/2010     Priority: Medium       Prior to Admission Medications   Medications Prior to Admission   Medication Sig Dispense Refill Last Dose     acetaminophen (TYLENOL) 500 MG tablet Take 1,000 mg by mouth daily as needed for mild pain Takes overnight   Unknown at PRN     acetaminophen (TYLENOL) 500 MG tablet Take 500 mg by mouth 2 times daily   3/10/2023 at AM     aspirin (ASPIRIN LOW DOSE) 81 MG tablet Take 1 tablet (81 mg) by mouth daily 30 tablet 3 3/10/2023 at AM     atorvastatin (LIPITOR) 20 MG tablet Take 1.5 tablets (30 mg) by mouth daily 135 tablet 3 3/9/2023 at  PM     buPROPion (WELLBUTRIN XL) 150 MG 24 hr tablet Take 150 mg by mouth every morning   3/10/2023 at AM     cariprazine (VRAYLAR) 4.5 MG capsule Take 4.5 mg by mouth daily   3/9/2023 at PM     clopidogrel (PLAVIX) 75 MG tablet Take 75 mg by mouth daily   3/10/2023 at AM     DULoxetine (CYMBALTA) 30 MG capsule Take 30 mg by mouth 2 times daily   3/10/2023 at AM     hydrochlorothiazide (HYDRODIURIL) 25 MG tablet Take 1.5 tablets (37.5 mg) by mouth daily 135 tablet 0 3/10/2023 at took 25 mg in AM     losartan (COZAAR) 50 MG tablet Take 50 mg by mouth daily   3/10/2023 at AM     medication given by implanted intrathecal pump continuous Drug # 1: Fentanyl (Sublimaze) - Conc: 2000 mcg/mL - Total Dose / 24 hours: 1561.9 mcg  Drug # 2: Bupivacaine (Marcaine)  - Conc: 20 mg/mL - Total Dose / 24 hours: 15.619 mg  Drug # 3: Morphine (Duramorph or Infumorph)  - Conc: 9 mg/mL - Total Dose / 24 hours: 7.0287 mg    Rate: 0.0325 mL/hr  Pump Reservoir Volume: 40 mL  Outside Clinic & Provider: Dr. Pawan Shaw, [facility]  Last Refill Date: 2/22/2023  Next Refill Date: 4/6/2023  Low [place] Alarm Date: 4/4/2023  Pump : Medtronic SynchroMed II    Boluses: Up to 3 per day, 3 minute duration. Lock-out every 6 hours  Fentanyl: 99.9 mcg/dose  Bupivacaine: 0.999 mg/dose  Morphine: 0.4497 mg/dose        metFORMIN (GLUCOPHAGE XR) 500 MG 24 hr tablet Take 2 tablets (1,000 mg) by mouth 2 times daily (with meals) 360 tablet 1 3/10/2023 at AM     metoprolol succinate ER (TOPROL-XL) 25 MG 24 hr tablet Take 2 tablets (50 mg) by mouth daily (Patient taking differently: Take 25 mg by mouth daily) 180 tablet 0 3/10/2023 at AM     modafinil (PROVIGIL) 200 MG tablet Take 1.5 tablets (300 mg) by mouth daily 45 tablet 0 3/10/2023 at AM     multivitamin w/minerals (THERA-VIT-M) tablet Take 1 tablet by mouth daily Men's 50+   3/10/2023 at AM     nitroGLYcerin (NITROSTAT) 0.4 MG sublingual tablet Place 0.4 mg under the tongue every 5  minutes as needed for chest pain For chest pain place 1 tablet under the tongue every 5 minutes for 3 doses. If symptoms persist 5 minutes after 1st dose call 911.   Unknown at PRN     oxyCODONE-acetaminophen (PERCOCET) 5-325 MG tablet Take 2 tablets by mouth 2 times daily   3/10/2023 at AM     pregabalin (LYRICA) 100 MG capsule Take 100 mg by mouth Take up to 4 times per day   3/10/2023 at X1 dose     senna-docusate (SENOKOT-S/PERICOLACE) 8.6-50 MG tablet Take 1-2 tablets by mouth 2 times daily At baseline, Take 1 tablet in the morning and 2 tablets in the evening. May take second tablet in morning if needed.   3/10/2023 at X1 tablet in AM     SUMAtriptan (IMITREX) 50 MG tablet Take 1 tablet (50 mg) by mouth at onset of headache for migraine May repeat in 2 hours. Max 4 tablets/24 hours. 8 tablet 1 More than a month at PRN     tamsulosin (FLOMAX) 0.4 MG capsule Take 1 capsule (0.4 mg) by mouth 2 times daily 30 capsule 3 3/10/2023 at AM     testosterone (ANDROGEL/TESTIM) 50 MG/5GM (1%) topical gel Place 50 mg of testosterone onto the skin daily   3/9/2023     amoxicillin-clavulanate (AUGMENTIN) 875-125 MG tablet Take 1 tablet by mouth 2 times daily 20 tablet 0  at not yet picked up/started     blood glucose (NO BRAND SPECIFIED) lancets standard Use to test blood sugar 1 time daily or as directed. 100 lancet 4      blood glucose (NO BRAND SPECIFIED) test strip Use to test blood sugar 1 time daily or as directed. 200 strip 4      dulaglutide (TRULICITY) 0.75 MG/0.5ML pen Inject 0.75 mg Subcutaneous every 7 days 2 mL 3  at not yet started/waiting on insurance auth     LORazepam (ATIVAN) 1 MG tablet Take 1 tablet (1 mg) by mouth once as needed for anxiety (Prior to MRI) 1 tablet 0  at not yet taken, MRI scheduled 3/13/23       Review of Systems  Complete ROS reviewed, unless noted in HPI, all other systems reviewed (with patient) and all others found to be negative.      Objective:     Physical Exam:  /80 (BP  "Location: Right arm)   Pulse 66   Temp 97.5  F (36.4  C) (Oral)   Resp 18   Wt 113.4 kg (250 lb)   SpO2 94%   BMI 40.35 kg/m    Weight:   Vitals:    03/11/23 0644 03/12/23 0514   Weight: 112.3 kg (247 lb 9.2 oz) 113.4 kg (250 lb)      Body mass index is 40.35 kg/m .    General Appearance:  Alert, cooperative, no distress   Head:  Normocephalic, without obvious abnormality, atraumatic   Eyes:  PERRL, conjunctiva/corneas clear, EOM's intact   ENT/Throat: Lips, mucosa, and tongue normal; teeth and gums normal   Lymph/Neck: Supple, symmetrical, trachea midline   Lungs:   Room air, respirations unlabored   Musculoskeletal: RLE red, area marked    Skin: Skin warm, dry    Neurologic: Alert and oriented X 3, Moves all 4 extremities     Psych: Affect is appropriate      Imaging: Reviewed I have personally reviewed pertinent labs, tests, and radiologic imaging in patient's chart.  US Lower Extremity Venous Duplex Right    Result Date: 3/9/2023  ULTRASOUND LOWER EXTREMITY VENOUS DUPLEX RIGHT 3/9/2023 2:08 PM CLINICAL HISTORY: stasis dermatitis/cellulitis-RLE; Pain of right lower leg. COMPARISONS: None available. REFERRING PROVIDER: JULIETH RENDON TECHNIQUE: Grayscale, color Doppler, Doppler waveform ultrasound evaluation was performed through the right common femoral, femoral, and popliteal veins. Right posterior tibial and peroneal veins were evaluated with grayscale imaging and compression. Left common femoral vein was evaluated for symmetry. FINDINGS: Left common femoral vein is patent, fully compressible, and demonstrates normal phasic Doppler waveform. Right common femoral, femoral, and popliteal veins are fully compressible, patent, and demonstrate normal phasic Doppler waveforms. Right posterior tibial veins are fully compressible to the ankle. Right peroneal veins are fully compressible to the distal calf.     IMPRESSION: No deep venous thrombosis demonstrated in the RIGHT leg. Reference: \"Duplex Ultrasound " "in the Diagnosis of Lower-Extremity Deep Venous Thrombosis\"- Purvi Cheng MD, S; Dustin Rodriguez MD (Circulation. 2014;129:917-921. http://circ.ahajournals.org) I have personally reviewed the examination and initial interpretation and I agree with the findings. MELANIE SOLORZANO   SYSTEM ID:  [id number]      Labs: Reviewed I have personally reviewed pertinent labs, tests, and radiologic imaging in patient's chart.  Recent Results (from the past 24 hour(s))   Glucose by meter    Collection Time: 03/12/23 11:49 AM   Result Value Ref Range    GLUCOSE BY METER POCT 235 (H) 70 - 99 mg/dL   Potassium    Collection Time: 03/12/23  2:33 PM   Result Value Ref Range    Potassium 4.2 3.4 - 5.3 mmol/L   Glucose by meter    Collection Time: 03/12/23  4:29 PM   Result Value Ref Range    GLUCOSE BY METER POCT 213 (H) 70 - 99 mg/dL   C. difficile Toxin B PCR with reflex to C. difficile Antigen and Toxins A/B EIA    Collection Time: 03/12/23  9:43 PM    Specimen: Per Rectum; Stool   Result Value Ref Range    C Difficile Toxin B by PCR Negative Negative   Glucose by meter    Collection Time: 03/12/23  9:49 PM   Result Value Ref Range    GLUCOSE BY METER POCT 176 (H) 70 - 99 mg/dL   Glucose by meter    Collection Time: 03/13/23  7:43 AM   Result Value Ref Range    GLUCOSE BY METER POCT 121 (H) 70 - 99 mg/dL       Total time spent 90 minutes with greater than 50% in consultation, education and coordination of care.     Also discussed with RN.     Please see A&P for additional details of medical decision making.    Elements of Medical Decision Making as described above. High level of decision making required due to 1 or more chronic illness with severe exacerbation, progression, and side effects of treatment.  High risk therapy including opioids, high risk drug therapy including oral and/or parenteral controlled substances    Thank you for this consultation.    Isidra PACHECO, FNP-C  Acute Care Pain Management Program  [facility] " [place] ([place], [place], [place])  Monday-Friday 8a-4p   Page via online paging system or call [phone]

## 2023-03-13 NOTE — PLAN OF CARE
Goal Outcome Evaluation:       A&Ox4. VSS on RA.  C/o chronic pain to L shoulder & RLE, controlled with internal pain pump R hip, scheduled meds & PRN hydromorphone. RLE 3+ edema & red/rosaura, redness outline, and decreasing. R toe scabbed. Extremity elevated on wedge pillow. Voiding adequately, multiple loose BM's today, negative c-diff result this am. PIV SL, int abx. Tolerating mod carb diet, BG checks w/ meals. Up SBA w/ cane, steady. Pt did fall asleep on toilet at 0605 and had a fall off the toilet. Pt refused to stay on floor and was assisted back to bed, SBA with GBW. VSS and neuro checks good. In house to come and assess, no further orders given. Pt reports to have braced fall with L hand and hip.

## 2023-03-13 NOTE — CODE/RAPID RESPONSE
[facility]    Fall Note  3/13/2023   Time Called: 0730    Date of Admission:  3/10/2023  Code Status: Full Code      Summary:  I was called to evaluate Parmjit Hernández, a 67 year old male following a fall. The patient is admitted for evaluation of extensive right lower extremity cellulitis. PMH includes type 2 diabetes, peripheral vascular disease, neuropathy, obesity, hypertension, hyperlipidemia, coronary artery disease s/p and STEMI with stent, systemic amyloidosis AL treated, chronic thrombocytopenia, MEJIAS, migraine headaches, chronic pain syndrome, NORMA, and bipolar 1 disorder.     Assessment:   Unwitnessed fall with controlled descent to the ground  Patient reports using the bathroom shortly after 6 AM this morning when he drifted off to sleep while sitting on the toilet as he believes this is related to his chronic fatigue.  Denies dizziness, shortness of breath, or chest pain preceding symptoms of syncope.  Vital signs and blood sugar stable. He states he felt his body tilting to the left side while sitting on the toilet and woke up when his left shoulder made contact with the bathroom wall.  He then extended his left arm and slowly assisted himself to the ground where his left buttock made contact with ground first.  He denies head strike.  He reports left shoulder pain, which is chronic for him, but cannot describe if it is worse than usual.  Last seen by [facility] [place], [place] January 18, 2023. Seen by Dr. Flores for healed left proximal humerus fracture with posttraumatic capsulitis (injury ~October 2022). He has had ongoing therapy and pain management.     Patient was assisted from the ground and was able to ambulate back to bed prior to my arrival.     Plan:  --orthostatic vitals   --Left shoulder x-ray, 2 views   --analgesics as previously ordered   - alternatively considered med interactions, cariprazine & duloxetine nightly (last dose 03/12 @ 2011) &  Dilaudid 6 mg at 0211   --UA  --fall precautions   --noted modafinil daily     Physical Exam  Constitutional:       General: He is not in acute distress.     Appearance: He is obese.   HENT:      Head: Normocephalic and atraumatic.      Mouth/Throat:      Mouth: Mucous membranes are moist.      Pharynx: Oropharynx is clear.   Eyes:      Extraocular Movements: Extraocular movements intact.      Pupils: Pupils are equal, round, and reactive to light.   Cardiovascular:      Rate and Rhythm: Normal rate and regular rhythm.      Pulses: Normal pulses.      Heart sounds: No murmur heard.  Pulmonary:      Effort: Pulmonary effort is normal. No respiratory distress.      Breath sounds: Normal breath sounds.   Abdominal:      General: Bowel sounds are normal.      Palpations: Abdomen is soft.   Genitourinary:     Comments: Describes history of difficulty emptying bladder  Musculoskeletal:      Cervical back: Normal range of motion and neck supple. No rigidity or tenderness.   Skin:     General: Skin is warm and dry.      Capillary Refill: Capillary refill takes 2 to 3 seconds.      Findings: Erythema present.      Comments: RLE cellulitis beginning to receded marked areas.  Right toes with what appears to have fungal lesions, patient reports seeing podiatrist next week.   Neurological:      General: No focal deficit present.      Mental Status: He is alert and oriented to person, place, and time. Mental status is at baseline.      Cranial Nerves: No cranial nerve deficit.      Comments: Baseline peripheral neuropathy in all 4 extremities   Psychiatric:         Mood and Affect: Mood normal.         Thought Content: Thought content normal.        /80 (BP Location: Right arm)   Pulse 66   Temp 97.5  F (36.4  C) (Oral)   Resp 18   Wt 113.4 kg (250 lb)   SpO2 94%   BMI 40.35 kg/m      Not all injuries from a fall are obvious on initial exam, please to not hesitate to call for reassessment by House provider should the  patient's clinical status change, report new pain, or patient/nursing concern.      Linda Boss NP  [facility]  Securely message with the [employer] Web Console (learn more here)  Text page via [employer] Paging/Directory

## 2023-03-13 NOTE — PLAN OF CARE
Goal Outcome Evaluation:      Plan of Care Reviewed With: patient    Overall Patient Progress: improvingOverall Patient Progress: improving     A&Ox4. VSS on RA ex hypertensive. C/o chronic pain to L shoulder & RLE, controlled with internal pain pump R hip, scheduled meds & PRN hydromorphone, lidocaine meds adjusted per pain service. LS clear. RLE 3+ edema & red/rosaura, redness outline. R toe scabbed. Extremity elevated. Voiding adequately.  PIV SL, int abx. Tolerating mod carb diet, BG checks w/ meals. Up SBA w/ cane, steady.  L shoulder xray and CT done after fall, ortho consult as well.  UA neg.  Can transition to oral ATB tomorrow.

## 2023-03-13 NOTE — PROGRESS NOTES
Patient with partial fall this AM in bathroom when he fell asleep on commode.  No major injury noted.  Has ongoing pain left shoulder which is hard to interpret with recent fracture history this winter.  X-ray appears with old fracture, doubt acute change.  Moving shoulder reasonably well on exam though has limited ROM.  Will ask ortho to consult given hospital fall with the x-ray hard to interpret.   RLE cellulitis improving, may move to oral abx tomorrow.  Ongoing pain issues RLE and given his chronic pain history with pain pump, will ask pain team to consult.  He also asked about an outpatient brain MRI he had scheduled this week.  I recommended he get this outpatient as was planned by his PCP as he has no acute neuro issues now and this is best coordinated with his other outpatient appointments.    Full progress note to follow.

## 2023-03-13 NOTE — CONSULTS
[facility]    Orthopedic Consultation    Parmjit Hernández MRN# [id number]   Age: 67 year old YOB: 1955     Date of Admission:  3/10/2023    Reason for consult: Left proximal humerus fracture       Requesting physician: Dashawn Franklin MD       Level of consult: One-time consult to assist in determining a diagnosis, recommend an appropriate treatment plan and place orders           Assessment and Plan:   Assessment:   Chronic, healing, closed left proximal humerus fracture with possible nondisplaced acute fracture after fall on 3/13/23      Plan:   The patient's history and clinical/diagnostic findings were reviewed with the on-call orthopedic trauma surgeon, Dr. Sher Barr. The patient reportedly sustained a left proximal humerus fracture approximately 3 months ago (December 2022) that is being managed by Dr. Andres at [facility]. On 3/13/23, the patient fell asleep on the toilet in the bathroom and fell onto his left upper extremity and buttock. He denies any significant increase in left shoulder pain. A left shoulder radiograph was ordered and displaced the chronic left proximal humerus fracture but an acute extension of the fracture could not be ruled out. From an exam standpoint, the patient has intact active ROM that is limited but unchanged from prior to the recent injury. NV intact. No surgical indications, though he is planning on a reverse TSA vs TSA with Dr. Andres at [facility] in the coming months.     -Left shoulder CT scan without contrast ordered to evaluate for any acute bony trauma of the proximal humerus. Will follow results.  -No lifting more than 3-5 pounds with the left upper extremity. Okay for gentle shoulder ROM and ROM as tolerated of the elbow, wrist, and digits.  -Mobilize with PT/OT.  -No current indication for a sling.  -Continue pain regimen. Consider cold compresses and lidocaine patches PRN.  -Follow up with Dr. Andres at  [facility] in 4 weeks for reevaluation.    Please contact orthopedic trauma team if any questions or concerns arise.           Chief Complaint:   Left shoulder discomfort         History of Present Illness:   Medical history obtained via chart review and discussion with the patient. 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     The patient reports a history of left proximal humerus fracture approximately 3 months ago after a mechanical fall. Earlier this morning (3/13/23) at approximately 0605, the patient fell asleep on the toilet and fell to the ground. He states he fell onto his left forearm and buttock. He avoided falling onto a completely outstretched left arm. He initially had increased discomfort in the left shoulder, but this has gradually improved. Describes mild left lateral proximal humerus pain. Mild soreness in the trapezius. Denies numbness and tingling distally. No additional pain in the upper extremity. The patient has established care with Dr. Andres at [facility] in [place] and states that he will be having a reverse TSA or TSA in the next few months pending A1c getting under 7.5%. Otherwise, no prior surgeries to his left shoulder.          Past Medical History:     Past Medical History:   Diagnosis Date     Acquired lymphedema 2/4/2019     Allergic state      Amyloidosis (H)     followed by hematology Dr. Romeo status post peripheral  stem cell transplant     Anxiety 5/11/2016     Anxiety and depression 12/18/2013     Bipolar I disorder (H) 9/1/2015    Under care of psychiatrist [facility]- Dr Rahman doxepin 25 mg, 2 cap bedtime DULoxetine 20 mg once daily  OLANZapine 7.5 mg  1 tab bedtime      Depressive disorder 1995     EKG abnormality 4/20/2020     H/O bone marrow transplant (H)      Headache(784.0)      History of diverticulitis 1/27/2015    1/15-rxed with antibiotics      Hyperlipidemia LDL goal <100 10/31/2010     Hypertension 2007     Hypertension goal BP (blood pressure) < 140/90 10/11/2011     Marianne (H) 8/20/2015     Morbid obesity (H) 8/28/2018     Myalgia and myositis, unspecified      Narcotic dependence (H) 9/6/2016    None in about 6 months- 8/1/17     NSTEMI (non-ST elevated myocardial infarction) (H) 04/19/2020     OCD (obsessive compulsive disorder)      Other acquired absence of organ 94     Other cirrhosis of liver (H) possibly from vences  4/15/2020     Other specified viral warts      Peripheral edema 5/20/2018    Noncardiac Continue with  furosemide (LASIX) 20 MG tablet,  potassium       chloride SA (K-DUR/KLOR-CON M) 10 MEQ CR  Tab once daily  Basic metabolic panel     Polyneuropathy associated with underlying disease (H) 2/4/2019     Restless legs syndrome (RLS) 6/29/2017     Stasis dermatitis of both legs 12/31/2018     Type 2 diabetes mellitus with other specified complication (H) 7/10/2017             Past Surgical History:     Past Surgical History:   Procedure Laterality Date     ABDOMEN SURGERY  2007    abdominal hernia     BACK SURGERY  8/6/2020     BIOPSY  june 2015    negative     BMT PROTOCOL      for systemic amyloidosis     BONE MARROW BIOPSY, BONE SPECIMEN, NEEDLE/TROCAR N/A 6/8/2016    Procedure: BIOPSY BONE MARROW;  Surgeon: Nathan Agrawal MD;  Location:  GI     BONE MARROW BIOPSY, BONE SPECIMEN, NEEDLE/TROCAR N/A 2/20/2019    Procedure: BIOPSY BONE MARROW;  Surgeon: Michael Raygoza MD;   Location:  GI     CHOLECYSTECTOMY  1995    lap qian     COLONOSCOPY  2012    hx polyps     ESOPHAGOSCOPY, GASTROSCOPY, DUODENOSCOPY (EGD), COMBINED N/A 5/29/2015    Procedure: COMBINED ESOPHAGOSCOPY, GASTROSCOPY, DUODENOSCOPY (EGD), BIOPSY SINGLE OR MULTIPLE;  Surgeon: John Jacob MD;  Location:  GI     HERNIA REPAIR, UMBILICAL  2006     ZZ NONSPECIFIC PROCEDURE  94    Cholecystectomy     [facility] NONSPECIFIC PROCEDURE  2000    repair deviated septum             Social History:     Social History     Tobacco Use     Smoking status: Never     Smokeless tobacco: Never   Substance Use Topics     Alcohol use: Not Currently     Alcohol/week: 0.0 standard drinks             Family History:     Family History   Problem Relation Age of Onset     Cerebrovascular Disease Mother      C.A.D. Mother      Hypertension Mother      Alcohol/Drug Mother      Arthritis Mother      Cerebrovascular Disease Maternal Grandmother      C.A.D. Maternal Grandmother      Alcohol/Drug Maternal Grandmother      C.A.D. Father      Heart Disease Father      Hypertension Father      Alcohol/Drug Father      Allergies Father      Circulatory Father      Depression Father      Respiratory Father      Asthma Father      Alcohol/Drug Paternal Grandfather      Cerebrovascular Disease Paternal Grandfather      Depression Son      Psychotic Disorder Son         anxiety disorder     Depression Daughter      Cerebrovascular Disease Maternal Grandfather      Cerebrovascular Disease Paternal Grandmother      Diabetes Brother      Allergies Sister      Depression Sister      Gynecology Sister              Immunizations:     VACCINE/DOSE   Diptheria   DPT   DTAP   HBIG   Hepatitis A   Hepatitis B   HIB   Influenza   Measles   Meningococcal   MMR   Mumps   Pneumococcal   Polio   Rubella   Small Pox   TDAP   Varicella   Zoster             Allergies:     Allergies   Allergen Reactions     Cephalexin Diarrhea     Liraglutide Other (See Comments),  Headache and Unknown     Other reaction(s): Headache, Unknown  PN: migraines  Increased migraine frequency and severity  PN: migraines  Increased migraine frequency and severity  Increased migraine frequency and severity  Increased migraine frequency and severity  PN: migraines  Increased migraine frequency and severity       Sulfa Drugs Swelling and Angioedema     Pt has taken  Taken sulfa drugs orally without trouble. He had problems with Sulfa eye drops. Eye swelled up  Other reaction(s): Other (See Comments)  LW Reaction: eye gts only-red, puffy eye  LW Reaction: eye gts only-red, puffy eye  Eye drops  LW Reaction: eye gts only-red, puffy eye  Eye drops       Victoza      Increased migraine frequency and severity             Medications:     Current Facility-Administered Medications   Medication     acetaminophen (TYLENOL) tablet 650 mg    Or     acetaminophen (TYLENOL) Suppository 650 mg     acetaminophen (TYLENOL) tablet 650 mg     aspirin EC tablet 81 mg     atorvastatin (LIPITOR) tablet 30 mg     bisacodyl (DULCOLAX) suppository 10 mg     buPROPion (WELLBUTRIN XL) 24 hr tablet 150 mg     cariprazine (VRAYLAR) capsule 4.5 mg     ceFAZolin (ANCEF) 2 g in 100 mL D5W intermittent infusion     clopidogrel (PLAVIX) tablet 75 mg     glucose gel 15-30 g    Or     dextrose 50 % injection 25-50 mL    Or     glucagon injection 1 mg     DULoxetine (CYMBALTA) DR capsule 30 mg     HYDROmorphone (DILAUDID) tablet 4-6 mg     hydrOXYzine (ATARAX) tablet 25 mg    Or     hydrOXYzine (ATARAX) tablet 50 mg     insulin aspart (NovoLOG) injection (RAPID ACTING)     insulin aspart (NovoLOG) injection (RAPID ACTING)     insulin glargine (LANTUS PEN) injection 12 Units     lactobacillus rhamnosus (GG) (CULTURELL) capsule 1 capsule     lidocaine (XYLOCAINE) 5 % ointment     losartan (COZAAR) tablet 50 mg     medication given by implanted intrathecal pump     melatonin tablet 1 mg     metFORMIN (GLUCOPHAGE XR) 24 hr tablet 500 mg      metoprolol succinate ER (TOPROL XL) 24 hr tablet 25 mg     modafinil (PROVIGIL) tablet 300 mg     naloxone (NARCAN) injection 0.2 mg    Or     naloxone (NARCAN) injection 0.4 mg    Or     naloxone (NARCAN) injection 0.2 mg    Or     naloxone (NARCAN) injection 0.4 mg     ondansetron (ZOFRAN ODT) ODT tab 4 mg    Or     ondansetron (ZOFRAN) injection 4 mg     pregabalin (LYRICA) capsule 100 mg     senna-docusate (SENOKOT-S/PERICOLACE) 8.6-50 MG per tablet 1 tablet    Or     senna-docusate (SENOKOT-S/PERICOLACE) 8.6-50 MG per tablet 2 tablet     senna-docusate (SENOKOT-S/PERICOLACE) 8.6-50 MG per tablet 1-2 tablet     sodium chloride (PF) 0.9% PF flush 3 mL     sodium chloride (PF) 0.9% PF flush 3 mL     tamsulosin (FLOMAX) capsule 0.4 mg             Review of Systems:   CV: NEGATIVE for chest pain, palpitations or peripheral edema  C: NEGATIVE for fever, chills, change in weight  E/M: NEGATIVE for ear, mouth and throat problems  R: NEGATIVE for significant cough or SOB          Physical Exam:   All vitals have been reviewed  Patient Vitals for the past 24 hrs:   BP Temp Temp src Pulse Resp SpO2   03/13/23 0746 116/80 97.5  F (36.4  C) Oral 66 18 94 %   03/13/23 0618 (!) 133/92 97.4  F (36.3  C) Oral -- 20 96 %   03/13/23 0218 123/71 (!) 95.9  F (35.5  C) Axillary -- 18 95 %   03/12/23 2210 131/86 97.5  F (36.4  C) Axillary -- 18 95 %   03/12/23 1605 (!) 158/91 97.7  F (36.5  C) Oral 81 18 92 %       Intake/Output Summary (Last 24 hours) at 3/13/2023 1456  Last data filed at 3/13/2023 1009  Gross per 24 hour   Intake --   Output 650 ml   Net -650 ml       Constitutional: Pleasant, alert, appropriate, following commands.  HEENT: Head atraumatic normocephalic. Pupils equal round and reactive.  Respiratory: Unlabored breathing no audible wheeze  Cardiovascular: Regular rate and rhythm per pulses.  GI: Abdomen is non-distended.  Lymph/Hematologic: No lymphadenopathy in areas examined.  Skin: Right lower extremity  cellulitis. No rashes, no cyanosis, no edema.  Musculoskeletal: Left upper extremity: Skin intact. No palpable deformity. No swelling, erythema, or ecchymosis. Minimal tenderness over the surgical neck region. Nontender over the trapezius. Nontender over the biceps, triceps, elbow, forearm, wrist, and hand/digits. Able to actively abduct the shoulder to 100 degrees without pain. Full AROM of elbow, wrist, digits without pain. AIN and PIN intact. 5/5  strength. Radial pulse 2+. Capillary refill < 2 seconds. Sensation intact to light touch throughout.  Neurologic: normal without focal findings, intact mental status, speech normal, alert and oriented x iii          Data:   All laboratory data reviewed  Results for orders placed or performed during the hospital encounter of 03/10/23   XR Shoulder Left 2 Views     Status: None    Narrative    SHOULDER LEFT TWO VIEWS 3/13/2023 10:26 AM     HISTORY: Status post fall with left shoulder impact, history of  proximal humerus fracture with posttraumatic capsulitis.    COMPARISON: None.       Impression    IMPRESSION: There is a proximal humerus fracture which is impacted and  may be an acute on chronic injury as one of the views shows a faint  horizontal lucency through the surgical neck. A dedicated shoulder  series or shoulder CT is recommended for further evaluation, given the  appearance and the patient's history of prior fracture in the past. No  dislocation.     LAZ BAZAN MD         SYSTEM ID:  [id number]   CRP inflammation     Status: Abnormal   Result Value Ref Range    CRP Inflammation 59.06 (H) <5.00 mg/L   Comprehensive metabolic panel     Status: Abnormal   Result Value Ref Range    Sodium 131 (L) 136 - 145 mmol/L    Potassium 3.7 3.4 - 5.3 mmol/L    Chloride 93 (L) 98 - 107 mmol/L    Carbon Dioxide (CO2) 28 22 - 29 mmol/L    Anion Gap 10 7 - 15 mmol/L    Urea Nitrogen 17.2 8.0 - 23.0 mg/dL    Creatinine 0.82 0.67 - 1.17 mg/dL    Calcium 9.2 8.8 - 10.2  mg/dL    Glucose 242 (H) 70 - 99 mg/dL    Alkaline Phosphatase 138 (H) 40 - 129 U/L    AST 24 10 - 50 U/L    ALT 29 10 - 50 U/L    Protein Total 6.6 6.4 - 8.3 g/dL    Albumin 3.7 3.5 - 5.2 g/dL    Bilirubin Total 0.4 <=1.2 mg/dL    GFR Estimate >90 >60 mL/min/1.73m2   CBC with platelets and differential     Status: Abnormal   Result Value Ref Range    WBC Count 9.8 4.0 - 11.0 10e3/uL    RBC Count 4.15 (L) 4.40 - 5.90 10e6/uL    Hemoglobin 13.6 13.3 - 17.7 g/dL    Hematocrit 38.2 (L) 40.0 - 53.0 %    MCV 92 78 - 100 fL    MCH 32.8 26.5 - 33.0 pg    MCHC 35.6 31.5 - 36.5 g/dL    RDW 13.5 10.0 - 15.0 %    Platelet Count 250 150 - 450 10e3/uL    % Neutrophils 78 %    % Lymphocytes 11 %    % Monocytes 6 %    % Eosinophils 3 %    % Basophils 1 %    % Immature Granulocytes 1 %    NRBCs per 100 WBC 0 <1 /100    Absolute Neutrophils 7.7 1.6 - 8.3 10e3/uL    Absolute Lymphocytes 1.1 0.8 - 5.3 10e3/uL    Absolute Monocytes 0.6 0.0 - 1.3 10e3/uL    Absolute Eosinophils 0.3 0.0 - 0.7 10e3/uL    Absolute Basophils 0.1 0.0 - 0.2 10e3/uL    Absolute Immature Granulocytes 0.1 <=0.4 10e3/uL    Absolute NRBCs 0.0 10e3/uL   Glucose by meter     Status: Abnormal   Result Value Ref Range    GLUCOSE BY METER POCT 201 (H) 70 - 99 mg/dL   Glucose by meter     Status: Abnormal   Result Value Ref Range    GLUCOSE BY METER POCT 199 (H) 70 - 99 mg/dL   Basic metabolic panel     Status: Abnormal   Result Value Ref Range    Sodium 131 (L) 136 - 145 mmol/L    Potassium 3.2 (L) 3.4 - 5.3 mmol/L    Chloride 90 (L) 98 - 107 mmol/L    Carbon Dioxide (CO2) 30 (H) 22 - 29 mmol/L    Anion Gap 11 7 - 15 mmol/L    Urea Nitrogen 20.0 8.0 - 23.0 mg/dL    Creatinine 1.38 (H) 0.67 - 1.17 mg/dL    Calcium 8.6 (L) 8.8 - 10.2 mg/dL    Glucose 184 (H) 70 - 99 mg/dL    GFR Estimate 56 (L) >60 mL/min/1.73m2   CBC with platelets     Status: Abnormal   Result Value Ref Range    WBC Count 8.8 4.0 - 11.0 10e3/uL    RBC Count 3.93 (L) 4.40 - 5.90 10e6/uL    Hemoglobin  12.8 (L) 13.3 - 17.7 g/dL    Hematocrit 36.9 (L) 40.0 - 53.0 %    MCV 94 78 - 100 fL    MCH 32.6 26.5 - 33.0 pg    MCHC 34.7 31.5 - 36.5 g/dL    RDW 13.6 10.0 - 15.0 %    Platelet Count 227 150 - 450 10e3/uL   Glucose by meter     Status: Abnormal   Result Value Ref Range    GLUCOSE BY METER POCT 180 (H) 70 - 99 mg/dL   Glucose by meter     Status: Abnormal   Result Value Ref Range    GLUCOSE BY METER POCT 182 (H) 70 - 99 mg/dL   Magnesium     Status: Normal   Result Value Ref Range    Magnesium 2.1 1.7 - 2.3 mg/dL   Glucose by meter     Status: Abnormal   Result Value Ref Range    GLUCOSE BY METER POCT 242 (H) 70 - 99 mg/dL   Glucose by meter     Status: Abnormal   Result Value Ref Range    GLUCOSE BY METER POCT 154 (H) 70 - 99 mg/dL   Glucose by meter     Status: Abnormal   Result Value Ref Range    GLUCOSE BY METER POCT 224 (H) 70 - 99 mg/dL   Glucose by meter     Status: Abnormal   Result Value Ref Range    GLUCOSE BY METER POCT 233 (H) 70 - 99 mg/dL   Basic metabolic panel     Status: Abnormal   Result Value Ref Range    Sodium 134 (L) 136 - 145 mmol/L    Potassium 3.3 (L) 3.4 - 5.3 mmol/L    Chloride 95 (L) 98 - 107 mmol/L    Carbon Dioxide (CO2) 32 (H) 22 - 29 mmol/L    Anion Gap 7 7 - 15 mmol/L    Urea Nitrogen 18.1 8.0 - 23.0 mg/dL    Creatinine 0.99 0.67 - 1.17 mg/dL    Calcium 8.7 (L) 8.8 - 10.2 mg/dL    Glucose 251 (H) 70 - 99 mg/dL    GFR Estimate 84 >60 mL/min/1.73m2   Glucose by meter     Status: Abnormal   Result Value Ref Range    GLUCOSE BY METER POCT 220 (H) 70 - 99 mg/dL   Glucose by meter     Status: Abnormal   Result Value Ref Range    GLUCOSE BY METER POCT 270 (H) 70 - 99 mg/dL   Potassium     Status: Normal   Result Value Ref Range    Potassium 4.2 3.4 - 5.3 mmol/L   Glucose by meter     Status: Abnormal   Result Value Ref Range    GLUCOSE BY METER POCT 235 (H) 70 - 99 mg/dL   Glucose by meter     Status: Abnormal   Result Value Ref Range    GLUCOSE BY METER POCT 213 (H) 70 - 99 mg/dL    Glucose by meter     Status: Abnormal   Result Value Ref Range    GLUCOSE BY METER POCT 176 (H) 70 - 99 mg/dL   Glucose by meter     Status: Abnormal   Result Value Ref Range    GLUCOSE BY METER POCT 121 (H) 70 - 99 mg/dL   Potassium     Status: Normal   Result Value Ref Range    Potassium 4.1 3.4 - 5.3 mmol/L   Glucose by meter     Status: Abnormal   Result Value Ref Range    GLUCOSE BY METER POCT 205 (H) 70 - 99 mg/dL   Glucose by meter     Status: Abnormal   Result Value Ref Range    GLUCOSE BY METER POCT 164 (H) 70 - 99 mg/dL   C. difficile Toxin B PCR with reflex to C. difficile Antigen and Toxins A/B EIA     Status: Normal    Specimen: Per Rectum; Stool   Result Value Ref Range    C Difficile Toxin B by PCR Negative Negative    Narrative    The [employer] Xpert C. difficile Assay, performed on the [email or web address], is a qualitative in vitro diagnostic test for rapid detection of toxin B gene sequences from unformed (liquid or soft) stool specimens collected from patients suspected of having Clostridioides difficile infection (CDI). The test utilizes automated real-time polymerase chain reaction (PCR) to detect toxin gene sequences associated with toxin producing C. difficile. The Xpert C. difficile Assay is intended as an aid in the diagnosis of CDI.   CBC with platelets differential     Status: Abnormal    Narrative    The following orders were created for panel order CBC with platelets differential.  Procedure                               Abnormality         Status                     ---------                               -----------         ------                     CBC with platelets and d...[[id number]]  Abnormal            Final result                 Please view results for these tests on the individual orders.          Attestation:  I have reviewed today's vital signs, notes, medications, labs and imaging with Dr. Sher Virgen.  Amount of time performed on this consult: 50  minutes.    Ale Huber PA-C  [facility]

## 2023-03-14 ENCOUNTER — APPOINTMENT (OUTPATIENT)
Dept: PHYSICAL THERAPY | Facility: CLINIC | Age: 68
DRG: 603 | End: 2023-03-14
Attending: INTERNAL MEDICINE
Payer: COMMERCIAL

## 2023-03-14 LAB
ANION GAP SERPL CALCULATED.3IONS-SCNC: 10 MMOL/L (ref 7–15)
BUN SERPL-MCNC: 9 MG/DL (ref 8–23)
CALCIUM SERPL-MCNC: 8.8 MG/DL (ref 8.8–10.2)
CHLORIDE SERPL-SCNC: 100 MMOL/L (ref 98–107)
CREAT SERPL-MCNC: 0.71 MG/DL (ref 0.67–1.17)
DEPRECATED HCO3 PLAS-SCNC: 28 MMOL/L (ref 22–29)
GFR SERPL CREATININE-BSD FRML MDRD: >90 ML/MIN/1.73M2
GLUCOSE BLDC GLUCOMTR-MCNC: 100 MG/DL (ref 70–99)
GLUCOSE BLDC GLUCOMTR-MCNC: 117 MG/DL (ref 70–99)
GLUCOSE BLDC GLUCOMTR-MCNC: 138 MG/DL (ref 70–99)
GLUCOSE BLDC GLUCOMTR-MCNC: 168 MG/DL (ref 70–99)
GLUCOSE BLDC GLUCOMTR-MCNC: 175 MG/DL (ref 70–99)
GLUCOSE SERPL-MCNC: 195 MG/DL (ref 70–99)
POTASSIUM SERPL-SCNC: 5 MMOL/L (ref 3.4–5.3)
SODIUM SERPL-SCNC: 138 MMOL/L (ref 136–145)

## 2023-03-14 PROCEDURE — 250N000011 HC RX IP 250 OP 636: Performed by: INTERNAL MEDICINE

## 2023-03-14 PROCEDURE — 250N000013 HC RX MED GY IP 250 OP 250 PS 637: Performed by: INTERNAL MEDICINE

## 2023-03-14 PROCEDURE — 250N000013 HC RX MED GY IP 250 OP 250 PS 637: Performed by: NURSE PRACTITIONER

## 2023-03-14 PROCEDURE — 80048 BASIC METABOLIC PNL TOTAL CA: CPT | Performed by: INTERNAL MEDICINE

## 2023-03-14 PROCEDURE — 120N000001 HC R&B MED SURG/OB

## 2023-03-14 PROCEDURE — 97161 PT EVAL LOW COMPLEX 20 MIN: CPT | Mod: GP

## 2023-03-14 PROCEDURE — 97140 MANUAL THERAPY 1/> REGIONS: CPT | Mod: GP

## 2023-03-14 PROCEDURE — 36416 COLLJ CAPILLARY BLOOD SPEC: CPT | Performed by: INTERNAL MEDICINE

## 2023-03-14 PROCEDURE — 99232 SBSQ HOSP IP/OBS MODERATE 35: CPT | Performed by: INTERNAL MEDICINE

## 2023-03-14 RX ORDER — HYDROCHLOROTHIAZIDE 25 MG/1
25 TABLET ORAL DAILY
Status: DISCONTINUED | OUTPATIENT
Start: 2023-03-14 | End: 2023-03-15 | Stop reason: HOSPADM

## 2023-03-14 RX ADMIN — ASPIRIN 81 MG: 81 TABLET, COATED ORAL at 09:32

## 2023-03-14 RX ADMIN — ACETAMINOPHEN 650 MG: 325 TABLET ORAL at 06:48

## 2023-03-14 RX ADMIN — ACETAMINOPHEN 650 MG: 325 TABLET ORAL at 12:54

## 2023-03-14 RX ADMIN — BUPROPION HYDROCHLORIDE 150 MG: 150 TABLET, FILM COATED, EXTENDED RELEASE ORAL at 09:33

## 2023-03-14 RX ADMIN — DULOXETINE HYDROCHLORIDE 30 MG: 30 CAPSULE, DELAYED RELEASE PELLETS ORAL at 09:28

## 2023-03-14 RX ADMIN — HYDROMORPHONE HYDROCHLORIDE 4 MG: 2 TABLET ORAL at 15:26

## 2023-03-14 RX ADMIN — INSULIN GLARGINE 12 UNITS: 100 INJECTION, SOLUTION SUBCUTANEOUS at 21:18

## 2023-03-14 RX ADMIN — INSULIN ASPART 2 UNITS: 100 INJECTION, SOLUTION INTRAVENOUS; SUBCUTANEOUS at 13:26

## 2023-03-14 RX ADMIN — CLOPIDOGREL BISULFATE 75 MG: 75 TABLET ORAL at 09:33

## 2023-03-14 RX ADMIN — METFORMIN ER 500 MG 500 MG: 500 TABLET ORAL at 18:12

## 2023-03-14 RX ADMIN — PREGABALIN 100 MG: 100 CAPSULE ORAL at 13:29

## 2023-03-14 RX ADMIN — CEFAZOLIN SODIUM 2 G: 2 INJECTION, SOLUTION INTRAVENOUS at 10:34

## 2023-03-14 RX ADMIN — HEPARIN SODIUM 5000 UNITS: 5000 INJECTION, SOLUTION INTRAVENOUS; SUBCUTANEOUS at 09:34

## 2023-03-14 RX ADMIN — CEFAZOLIN SODIUM 2 G: 2 INJECTION, SOLUTION INTRAVENOUS at 03:09

## 2023-03-14 RX ADMIN — METFORMIN ER 500 MG 500 MG: 500 TABLET ORAL at 09:28

## 2023-03-14 RX ADMIN — Medication 1 CAPSULE: at 21:17

## 2023-03-14 RX ADMIN — PREGABALIN 100 MG: 100 CAPSULE ORAL at 09:33

## 2023-03-14 RX ADMIN — DULOXETINE HYDROCHLORIDE 30 MG: 30 CAPSULE, DELAYED RELEASE PELLETS ORAL at 19:49

## 2023-03-14 RX ADMIN — HYDROCHLOROTHIAZIDE 25 MG: 25 TABLET ORAL at 14:43

## 2023-03-14 RX ADMIN — HYDROMORPHONE HYDROCHLORIDE 6 MG: 2 TABLET ORAL at 03:10

## 2023-03-14 RX ADMIN — MODAFINIL 300 MG: 200 TABLET ORAL at 09:29

## 2023-03-14 RX ADMIN — INSULIN ASPART 2 UNITS: 100 INJECTION, SOLUTION INTRAVENOUS; SUBCUTANEOUS at 18:13

## 2023-03-14 RX ADMIN — HYDROMORPHONE HYDROCHLORIDE 4 MG: 2 TABLET ORAL at 09:39

## 2023-03-14 RX ADMIN — ACETAMINOPHEN 650 MG: 325 TABLET ORAL at 00:03

## 2023-03-14 RX ADMIN — Medication 1 CAPSULE: at 09:32

## 2023-03-14 RX ADMIN — PREGABALIN 100 MG: 100 CAPSULE ORAL at 19:49

## 2023-03-14 RX ADMIN — ACETAMINOPHEN 650 MG: 325 TABLET ORAL at 18:12

## 2023-03-14 RX ADMIN — METOPROLOL SUCCINATE 25 MG: 25 TABLET, EXTENDED RELEASE ORAL at 09:32

## 2023-03-14 RX ADMIN — LOSARTAN POTASSIUM 50 MG: 50 TABLET, FILM COATED ORAL at 09:31

## 2023-03-14 RX ADMIN — TAMSULOSIN HYDROCHLORIDE 0.4 MG: 0.4 CAPSULE ORAL at 09:33

## 2023-03-14 RX ADMIN — CEFAZOLIN SODIUM 2 G: 2 INJECTION, SOLUTION INTRAVENOUS at 18:13

## 2023-03-14 RX ADMIN — ATORVASTATIN CALCIUM 30 MG: 20 TABLET, FILM COATED ORAL at 21:17

## 2023-03-14 RX ADMIN — HYDROMORPHONE HYDROCHLORIDE 6 MG: 2 TABLET ORAL at 19:48

## 2023-03-14 RX ADMIN — CARIPRAZINE 4.5 MG: 1.5 CAPSULE, GELATIN COATED ORAL at 19:48

## 2023-03-14 RX ADMIN — TAMSULOSIN HYDROCHLORIDE 0.4 MG: 0.4 CAPSULE ORAL at 19:49

## 2023-03-14 RX ADMIN — HEPARIN SODIUM 5000 UNITS: 5000 INJECTION, SOLUTION INTRAVENOUS; SUBCUTANEOUS at 19:48

## 2023-03-14 ASSESSMENT — ACTIVITIES OF DAILY LIVING (ADL)
ADLS_ACUITY_SCORE: 39
NUMBER_OF_TIMES_PATIENT_HAS_FALLEN_WITHIN_LAST_SIX_MONTHS: 2
ADLS_ACUITY_SCORE: 22
ADLS_ACUITY_SCORE: 39
ADLS_ACUITY_SCORE: 21
ADLS_ACUITY_SCORE: 39
TOILETING_ISSUES: NO
FALL_HISTORY_WITHIN_LAST_SIX_MONTHS: YES
ADLS_ACUITY_SCORE: 39
DRESSING/BATHING_DIFFICULTY: NO
WALKING_OR_CLIMBING_STAIRS_DIFFICULTY: NO
ADLS_ACUITY_SCORE: 39
CONCENTRATING,_REMEMBERING_OR_MAKING_DECISIONS_DIFFICULTY: NO
WEAR_GLASSES_OR_BLIND: NO
DIFFICULTY_EATING/SWALLOWING: NO
DOING_ERRANDS_INDEPENDENTLY_DIFFICULTY: NO
CHANGE_IN_FUNCTIONAL_STATUS_SINCE_ONSET_OF_CURRENT_ILLNESS/INJURY: NO
ADLS_ACUITY_SCORE: 39
ADLS_ACUITY_SCORE: 22
ADLS_ACUITY_SCORE: 20
ADLS_ACUITY_SCORE: 22
ADLS_ACUITY_SCORE: 21

## 2023-03-14 NOTE — PLAN OF CARE
Goal Outcome Evaluation:    Admission: Lymphedema, RLE cellulitis    Pt A&Ox4, but forgetful, able to let needs be known. RLE- cellulitis outlined and extremity elevated. BLE- numb at baseline. Reports 8/10 RLE pain and intermittent right shoulder pain- PRN Dilaudid administered upon pt request. Pt sleeping upon reassessments and in-between cares, appeared comfortable during rounding. Vital signs stable, call light within reach and bed alarm on for safety.     Writer assumed care of pt from [id number].  Arlen Dunaway RN on 3/14/2023 at 6:53 AM

## 2023-03-14 NOTE — PROGRESS NOTES
Transfer Note    Patient transferred to 67 via wheelchair accompanied by nursing staff.  Report called to Althea RINCON. Discussed plan of care with patient.  Reviewed belongings checklist - checklist and belongings sent with patient: Yes

## 2023-03-14 NOTE — PROGRESS NOTES
Ortho brief note:    CT completed of left humerus:  IMPRESSION:  1.  Findings favored to reflect a subacute impacted fracture of the  humeral neck with probable extension to the greater tuberosity with  adjacent callus formation.    Appears no acute process is occurring and given patients benign exam and lack of  dramatic impairments will keep the previously recommended treatment course outlined by MARS Huber's consult note.  Patient to continue with plan of a reverse TSA vs TSA with Dr. Andres at [facility] in the near future.      -No lifting more than 3-5 pounds with the left upper extremity. Okay for gentle shoulder ROM and ROM as tolerated of the elbow, wrist, and digits.  -Mobilize with PT/OT.  -No current indication for a sling.  -Continue pain regimen. Consider cold compresses and lidocaine patches PRN.  -Follow up with Dr. Andres at [facility] in 4 weeks for reevaluation.     Please contact orthopedic trauma team if any questions or concerns arise.    Kjerstin Foss PA-C [facility]

## 2023-03-14 NOTE — PLAN OF CARE
Goal Outcome Evaluation:    DATE & TIME: 3/14/2023 [id number]    Cognitive Concerns/ Orientation : A&OX4  BEHAVIOR & AGGRESSION TOOL COLOR: Green   ABNL VS/O2: VSS. HTN noted.  Hydrochlorothiazide reordered  MOBILITY: Ind with cane  PAIN MANAGMENT: PRN dilaudid and scheduled lyrica ordered. Intrathecal pain pump  DIET: Mod/Carb  BOWEL/BLADDER: Continent  ABNL LAB/BG:   DRAIN/DEVICES: PIV SL  TELEMETRY RHYTHM: NA  SKIN: LE red, rosaura.  RLE tingling numbness, red  TESTS/PROCEDURES: NA  D/C DATE: Tomorrow to home  OTHER IMPORTANT INFO: Pt transfered from gen/surg this shift.  Pt has a h/o chronic pain.  Has intrathecal pain pump and PRN dilaudid available. H/o humeral neck fx.  Ortho following.      Plan of Care Reviewed With: patient    Overall Patient Progress: improvingOverall Patient Progress: improving

## 2023-03-14 NOTE — PROGRESS NOTES
03/14/23 1059   Appointment Info   Signing Clinician's Name / Credentials (PT) Alida Souza, PT, DPT   Quick Adds   Quick Adds Edema Eval   Living Environment   People in Home alone   Current Living Arrangements apartment   Home Accessibility no concerns   Transportation Anticipated car, drives self;family or friend will provide   Living Environment Comments Pt has elevator access to apartment. Lives with cat.   Self-Care   Usual Activity Tolerance moderate   Equipment Currently Used at Home cane, straight   Fall history within last six months yes   Number of times patient has fallen within last six months 2   Activity/Exercise/Self-Care Comment Pt reports uses a cane for ambulation, otherwise ind with ADLs and IADLs at baseline.   General Information   Onset of Illness/Injury or Date of Surgery 03/10/23   Referring Physician Meghan Davila DO   Patient/Family Therapy Goals Statement (PT) Pt reports has OP Lymphedema appointment set up already. 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  No systemic symptoms.   Existing Precautions/Restrictions fall   Weight-Bearing Status - LLE other (see comments)  (no lifting >5#)   Edema General Information   Onset of Edema   (Pt reports onset of edema started post cancer treatment years ago.)   Affected Body Part(s) Left LE;Right LE   Edema Etiology Chemo;Infection   Edema Precautions Acute infection;Cardiac  Edema/CHF   Edema Precaution Comments Pt with new onset of increased edema on RLE, errythema consistent with cellulitis. Pt has been on antibiotics since 3/10.   General Comments/Previous Edema Treatment/Edema Equipment Pt has been seen by OP lymph therapist in the past for lymph wraps years ago and now wears compression stockings everyday. Pt sees podiatrist for great toe debridement.   Edema Examination/Assessment   Skin Condition Pitting;Dryness   Skin Condition Comments dryness bilaterally around feet, small blisters present on R leg, redness on R leg, warm to the touch, painful to the touch, R great toe with significant dryness, cracked, discoloration.   Pitting Assessment 2-3+ on R lower leg from base of ankle proximally. 2+ on L foot and lower leg. R foot presents without edema, pt has been keeping R leg elevated on blue wedge.   Cognition   Affect/Mental Status (Cognition) WFL   Orientation Status (Cognition) oriented x 4   Follows Commands (Cognition) WFL   Pain Assessment   Patient Currently in Pain   (7/10 on R leg.)   Range of Motion (ROM)   ROM Comment R ankle limited due to edema.   Strength (Manual Muscle Testing)   Strength Comments Able to complete SLR bilaterally.   Clinical Impression   Criteria for Skilled Therapeutic Intervention Yes, treatment indicated   PT Diagnosis (PT) Edema   Edema: Patient Presentation Edema;Stage 1 Lymphedema   Influenced by the following impairments pain, decreased functional ROM and strength   Functional limitations due to impairments pain, impaired functional independence   Clinical Presentation (PT Evaluation Complexity) Evolving/Changing   Clinical Presentation Rationale per MR and clinical judgement   Clinical Decision Making (Complexity) low complexity   Edema: Planned Interventions Fit for compression garment;Edema exercises;Precautions to prevent infection/exacerbation;Education;Manual therapy   Risk & Benefits of therapy have been explained evaluation/treatment  results reviewed;care plan/treatment goals reviewed;risks/benefits reviewed;current/potential barriers reviewed;participants voiced agreement with care plan;participants included;patient   PT Total Evaluation Time   PT [place], Low Complexity Minutes (67262) 4   Physical Therapy Goals   PT Frequency Daily   PT Predicted Duration/Target Date for Goal Attainment 03/19/23   PT Goals Edema   PT: Edema education to increase ability to manage edema after discharge from the hospital Patient;[place];Skin care routine;signs/symptoms of intolerance;wear schedule;limb positioning;garment/bandage care;discharge recommendations   PT: Management of edema bandages Patient;Verbalize;Demonstrate;[place];garment(s)   PT: Functional edema exercise program to reduce limb volume, increase activity tolerance and improve independence with ADL Patient;Verbalize;Demonstrates;[place];HEP;walking program   Interventions   Interventions Quick Adds Manual Therapy   Manual Therapy   Manual Therapy Minutes (88012) 27   Symptoms Noted During/After Treatment None   Treatment Detail/Skilled Intervention Pt was received supine in bed, agreeable to therapist assessment of edema. Pt presents with 2-3+ edema in R LE with dryness noted on bilateral LE's. Pt has 1-2+ edema on L LE, no pitting edema noted in groin area. Pt is not appropriate for LE quick wrap. Therapist completed washing of bilateral LE's and application of lotion to L leg only, pt was educated on importance of skin care and daily checks to ensure skin integrity. Therapist completed application of size medium edema wear from base of toes to just below knees. Pt was educated on signs and symptoms of edema intolerance and verbalized understanding. Therapist also educated pt on OP edema clinic. Pt completed set of 5 reps ankle pumps. Edu pt to complete ankle pumps, SLR and walking program in addition to elevation of LE to reduce edema. Pt verbalized understanding. Pt left with  nursing staff, as pt was moving to a different floor.   PT Discharge Planning   PT Plan assess tolerance with edema wear, ask RN to get PT eval if still appropriate, initiate walking program   PT Discharge Recommendation (DC Rec) home with outpatient physical therapy   PT Rationale for DC Rec Pt not appropriate for lymph wraps at this time. Pt was able to tolerate edema wear at this time. Recommend continue use of edemawear as tolerated then return to use of compression socks as able. Elevation of legs on pillows and wedge. Recommend pt follow up with OP lymph therapist. Pt does have a standing appointment on 3/17/23 already.   Total Session Time   Timed Code Treatment Minutes 27   Total Session Time (sum of timed and untimed services) 31

## 2023-03-14 NOTE — PLAN OF CARE
Goal Outcome Evaluation:  DATE & TIME: 3/14/2023 1500- 1930   Cognitive Concerns/ Orientation : A&OX4  BEHAVIOR & AGGRESSION TOOL COLOR: Green   ABNL VS/O2: VSS.  On RA  MOBILITY: Ind with cane  PAIN MANAGMENT:  right leg pain managed with pain pump and PRN dilaudid .  DIET: Mod/Carb  BOWEL/BLADDER: Continent  ABNL LAB/BG: BS  DRAIN/DEVICES: PIV SL  TELEMETRY RHYTHM: NA  SKIN: LE red, rosaura.  2-3+ edema. Right leg elevated up on  pillows.  TESTS/PROCEDURES: NA  D/C DATE: Tomorrow to home  OTHER IMPORTANT INFO:t.  Pt has a h/o chronic pain.  Has intrathecal pain pump.Humeral neck fx.  Ortho following.

## 2023-03-14 NOTE — PROGRESS NOTES
[facility]    Medicine Progress Note - Hospitalist Service    Date of Admission:  3/10/2023    Assessment & Plan   66 y/o male with DM presents with RLE cellulitis and acute on chronic pain.    RLE Cellulitis:  -  Improving, keep RLE elevated when not up.  US negative for DVT.  Cellulitis continues to slowly improve.  Continue IV cefazolin today.  Discussed with patient, will tentatively plan to move to oral keflex tomorrow.  Would do an extended course of at least 14 days from initial abx.  -  Continue probiotics, prior loose stools somewhat improved    Fall in bathroom AM 3/13  Recent L humerus fracture:  -  Reportedly fell asleep on commode and leaned sideways partially falling.  He then lowered himself to ground.  He denies hitting head or falling full force.  He was assessed by the house team.  No definitive acute new injury.  He has chronic left shoulder issues with a hum fx earlier this winter.  Given this fx, x-ray obtained.  X-ray difficult to interpret with prior fx + fall.  He is moving arm fairly well as prior.  -  Appreciate ortho consult, CT shoulder result pending.  Will defer shoulder management to ortho.  -  Fall precautions    Hyponatremia  Hypokalemia  KWABENA, likely dehydration - resolved:  -  Likely partly hydrochlorothiazide related.  Sodium improved prior.  BMP today not back yet.  If looks reasonable will resume hydrochlorothiazide today.  -  Replacing K per protocol    Diabetes mellitus type 2, poorly controlled baseline:    hemoglobin A1c 9.3 on 2/23/2023, with complications of neuropathy  -Resume metformin 3/13  -Continue Lantus 12 units at bedtime and sliding scale insulin prn    Coronary artery disease, s/p NSTEMI, s/p PCI to proximal GREG 1 in 11/2021  Peripheral vascular disease  Essential hypertension  Hyperlipidemia  -Continue aspirin, Plavix, statin, losartan, metoprolol   -Hold hydrochlorothiazide (as above).  -  BP variable from low 100's to 160's systolic  lately.  I believe partly pain related swings.  Hold on aggressively increasing meds unless consistently high.  May resume hydrochlorothiazide later today depending on f/u BMP.    Chronic diastolic congestive heart failure with preserved EF of 55-60%, echo 8/2021  Chronic lower extremity edema-multifactorial  -Last echo was in 8/2021 that showed normal LVEF of 55-60%, mild concentric LVH, normal RV size and function, mildly dilated left atrium, borderline dilated ascending aorta at 3.9 cm, no significant valve disease  -Has chronic lower extremity edema which is multifactorial-due to diastolic CHF, obstructive sleep apnea, dependent edema (sleeps in a recliner and sits on desk/chair every day), chronic lymphedema  -hold further IV lasix, as above  TEDs socks and elevation important as tolerated and for the long-term     AL amyloidosis s/p autologous stem cell transplant at [facility], 2016   -Stable.  No current diagnosis.  Follows up at [facility]     MEJIAS  -Chronic issue, outpt follow-up     Migraine headaches  Fibromyalgia  Acute on chronic pain syndrome with cellulitis:  -  Patient with pain pump and chronic pain issues.  Currently on combination of acute pain oral meds + pain pump.  Pain team consult appreciated.  Pain is starting to improve in RLE now that cellulitis improving.   -Tylenol as needed  -Continue oxycodone-acetaminophen 2 tablets twice daily (this is for his chronic left shoulder pain)  -Continue pregabalin  -Dilaudid PRN oral       Obstructive sleep apnea  -Does not use CPAP.  Reports that due to back pain, he sleeps in a recliner and is not able to use CPAP.  -Continue modafinil     Bipolar 1 disorder, manic type, mild,   -Stable  -Continue Cymbalta, Vraylar, Wellbutrin    Medical Decision Making       40 MINUTES SPENT BY ME on the date of service doing chart review, history, exam, documentation & further activities per the note.       Diet: Moderate Consistent Carb (60 g CHO per Meal) Diet   "  DVT Prophylaxis: Heparin SQ  Parker Catheter: Not present  Lines: None     Cardiac Monitoring: None  Code Status: Full Code      Clinically Significant Risk Factors                     , PRESENT ON ADMISSION  # Severe Obesity: Estimated body mass index is 40.56 kg/m  as calculated from the following:    Height as of an earlier encounter on 3/10/23: 1.676 m (5' 6\").    Weight as of this encounter: 114 kg (251 lb 5.2 oz)., PRESENT ON ADMISSION         Disposition Plan     Expected Discharge Date: 03/14/2023        Discharge Comments: Right leg cellulitis, on Ancef  K+ 3.2, replacing          Dashawn Franklin, DO  Hospitalist Service  [facility]  Securely message with etrigg (more info)  Text page via [address] Paging/Directory   ______________________________________________________________________    Interval History   Feeling better, again thinks leg looks better than yesterday.  Pain starting to improve RLE.  No major new left shoulder complaints.  Denies other new complaints.    Physical Exam   Vital Signs: Temp: 97.2  F (36.2  C) Temp src: Oral BP: (!) 147/84 Pulse: 64   Resp: 18 SpO2: 92 % O2 Device: None (Room air)    Weight: 251 lbs 5.19 oz    GEN:  Alert, oriented x 3, appears comfortable, no overt distress.  HEENT:  Normocephalic/atraumatic, no scleral icterus, no nasal discharge, mouth moist.  CV:  Regular rate and rhythm, no loud murmur or rub.  LUNGS:  Clear to auscultation bilaterally without rales/rhonchi/wheezing/retractions.  Symmetric chest rise on inhalation noted.  ABD:  Active bowel sounds, soft, non-tender/non-distended.  No rebound/guarding/rigidity.  EXT:   +1-2 RLE edema. Erythema appears improved slightly again from yesterday.    Medications     medication given by implanted intrathecal pump         acetaminophen  650 mg Oral Q6H     aspirin  81 mg Oral Daily     atorvastatin  30 mg Oral Daily     buPROPion  150 mg Oral QAM     cariprazine  4.5 mg Oral Daily     " ceFAZolin  2 g Intravenous Q8H     clopidogrel  75 mg Oral Daily     DULoxetine  30 mg Oral BID     heparin ANTICOAGULANT  5,000 Units Subcutaneous Q12H     insulin aspart  1-10 Units Subcutaneous TID AC     insulin aspart  1-7 Units Subcutaneous At Bedtime     insulin glargine  12 Units Subcutaneous At Bedtime     lactobacillus rhamnosus (GG)  1 capsule Oral BID     lidocaine   Topical 4x Daily     losartan  50 mg Oral Daily     metFORMIN  500 mg Oral BID w/meals     metoprolol succinate ER  25 mg Oral Daily     modafinil  300 mg Oral Daily     pregabalin  100 mg Oral TID     senna-docusate  1-2 tablet Oral BID     sodium chloride (PF)  3 mL Intracatheter Q8H     tamsulosin  0.4 mg Oral BID       Data     Labs and Imaging results below reviewed today.  Recent Labs   Lab 03/11/23  0701 03/10/23  1238 03/09/23  0832   WBC 8.8 9.8 10.3   HGB 12.8* 13.6 14.7   HCT 36.9* 38.2* 40.5   MCV 94 92 92    250 249     7-Day Micro Results     Collected Updated Procedure Result Status      03/12/2023 2143 03/13/2023 0628 C. difficile Toxin B PCR with reflex to C. difficile Antigen and Toxins A/B EIA [[id number]]    Stool from Per Rectum    Final result Component Value   C Difficile Toxin B by PCR Negative   A negative result does not exclude actual disease due to C. difficile and may be due to improper collection, handling and storage of the specimen or the number of organisms in the specimen is below the detection limit of the assay.                Recent Labs   Lab 03/14/23  0256 03/13/23  2148 03/13/23  1854 03/13/23  1227 03/13/23  1053 03/12/23  1629 03/12/23  1433 03/12/23  0805 03/12/23  0616 03/11/23  0801 03/11/23  0701 03/10/23  1632 03/10/23  1238   NA  --   --   --   --   --   --   --   --  134*  --  131*  --  131*   POTASSIUM  --   --   --   --  4.1  --  4.2  --  3.3*  --  3.2*  --  3.7   CHLORIDE  --   --   --   --   --   --   --   --  95*  --  90*  --  93*   CO2  --   --   --   --   --   --   --   --  32*   --  30*  --  28   ANIONGAP  --   --   --   --   --   --   --   --  7  --  11  --  10   * 157* 142*   < >  --    < >  --    < > 251*   < > 184*   < > 242*   BUN  --   --   --   --   --   --   --   --  18.1  --  20.0  --  17.2   CR  --   --   --   --   --   --   --   --  0.99  --  1.38*  --  0.82   GFRESTIMATED  --   --   --   --   --   --   --   --  84  --  56*  --  >90   LUIS  --   --   --   --   --   --   --   --  8.7*  --  8.6*  --  9.2   MAG  --   --   --   --   --   --   --   --   --   --  2.1  --   --    PROTTOTAL  --   --   --   --   --   --   --   --   --   --   --   --  6.6   ALBUMIN  --   --   --   --   --   --   --   --   --   --   --   --  3.7   BILITOTAL  --   --   --   --   --   --   --   --   --   --   --   --  0.4   ALKPHOS  --   --   --   --   --   --   --   --   --   --   --   --  138*   AST  --   --   --   --   --   --   --   --   --   --   --   --  24   ALT  --   --   --   --   --   --   --   --   --   --   --   --  29    < > = values in this interval not displayed.       Recent Results (from the past 24 hour(s))   XR Shoulder Left 2 Views    Narrative    SHOULDER LEFT TWO VIEWS 3/13/2023 10:26 AM     HISTORY: Status post fall with left shoulder impact, history of  proximal humerus fracture with posttraumatic capsulitis.    COMPARISON: None.       Impression    IMPRESSION: There is a proximal humerus fracture which is impacted and  may be an acute on chronic injury as one of the views shows a faint  horizontal lucency through the surgical neck. A dedicated shoulder  series or shoulder CT is recommended for further evaluation, given the  appearance and the patient's history of prior fracture in the past. No  dislocation.     LAZ BAZAN MD         SYSTEM ID:  [id number]

## 2023-03-15 ENCOUNTER — APPOINTMENT (OUTPATIENT)
Dept: PHYSICAL THERAPY | Facility: CLINIC | Age: 68
DRG: 603 | End: 2023-03-15
Payer: COMMERCIAL

## 2023-03-15 VITALS
OXYGEN SATURATION: 94 % | WEIGHT: 252.5 LBS | BODY MASS INDEX: 40.58 KG/M2 | RESPIRATION RATE: 20 BRPM | SYSTOLIC BLOOD PRESSURE: 129 MMHG | TEMPERATURE: 97.8 F | HEIGHT: 66 IN | HEART RATE: 72 BPM | DIASTOLIC BLOOD PRESSURE: 84 MMHG

## 2023-03-15 LAB
GLUCOSE BLDC GLUCOMTR-MCNC: 140 MG/DL (ref 70–99)
GLUCOSE BLDC GLUCOMTR-MCNC: 172 MG/DL (ref 70–99)
GLUCOSE BLDC GLUCOMTR-MCNC: 179 MG/DL (ref 70–99)

## 2023-03-15 PROCEDURE — 250N000013 HC RX MED GY IP 250 OP 250 PS 637: Performed by: INTERNAL MEDICINE

## 2023-03-15 PROCEDURE — 250N000013 HC RX MED GY IP 250 OP 250 PS 637: Performed by: NURSE PRACTITIONER

## 2023-03-15 PROCEDURE — 99239 HOSP IP/OBS DSCHRG MGMT >30: CPT | Performed by: INTERNAL MEDICINE

## 2023-03-15 PROCEDURE — 250N000011 HC RX IP 250 OP 636: Performed by: INTERNAL MEDICINE

## 2023-03-15 PROCEDURE — 97140 MANUAL THERAPY 1/> REGIONS: CPT | Mod: GP

## 2023-03-15 RX ORDER — NICOTINE POLACRILEX 4 MG
15-30 LOZENGE BUCCAL
Qty: 15 G | Refills: 0 | Status: ON HOLD | OUTPATIENT
Start: 2023-03-15 | End: 2023-09-27

## 2023-03-15 RX ORDER — LOPERAMIDE HCL 2 MG
2 CAPSULE ORAL 4 TIMES DAILY PRN
COMMUNITY
Start: 2023-03-15

## 2023-03-15 RX ORDER — LOPERAMIDE HCL 2 MG
2 CAPSULE ORAL 4 TIMES DAILY PRN
Status: DISCONTINUED | OUTPATIENT
Start: 2023-03-15 | End: 2023-03-15 | Stop reason: HOSPADM

## 2023-03-15 RX ORDER — OXYCODONE AND ACETAMINOPHEN 5; 325 MG/1; MG/1
2 TABLET ORAL 2 TIMES DAILY
Status: DISCONTINUED | OUTPATIENT
Start: 2023-03-15 | End: 2023-03-15 | Stop reason: HOSPADM

## 2023-03-15 RX ORDER — SACCHAROMYCES BOULARDII 250 MG
250 CAPSULE ORAL 2 TIMES DAILY
Qty: 14 CAPSULE | Refills: 0 | Status: SHIPPED | OUTPATIENT
Start: 2023-03-15 | End: 2023-03-31

## 2023-03-15 RX ORDER — CEPHALEXIN 500 MG/1
500 CAPSULE ORAL EVERY 8 HOURS SCHEDULED
Status: DISCONTINUED | OUTPATIENT
Start: 2023-03-15 | End: 2023-03-15 | Stop reason: HOSPADM

## 2023-03-15 RX ORDER — CEPHALEXIN 500 MG/1
500 CAPSULE ORAL EVERY 8 HOURS
Qty: 18 CAPSULE | Refills: 0 | Status: SHIPPED | OUTPATIENT
Start: 2023-03-15 | End: 2023-03-21

## 2023-03-15 RX ADMIN — Medication 1 CAPSULE: at 08:56

## 2023-03-15 RX ADMIN — PREGABALIN 100 MG: 100 CAPSULE ORAL at 13:11

## 2023-03-15 RX ADMIN — CEFAZOLIN SODIUM 2 G: 2 INJECTION, SOLUTION INTRAVENOUS at 02:22

## 2023-03-15 RX ADMIN — HYDROCHLOROTHIAZIDE 25 MG: 25 TABLET ORAL at 08:56

## 2023-03-15 RX ADMIN — HEPARIN SODIUM 5000 UNITS: 5000 INJECTION, SOLUTION INTRAVENOUS; SUBCUTANEOUS at 09:08

## 2023-03-15 RX ADMIN — METFORMIN ER 500 MG 500 MG: 500 TABLET ORAL at 08:55

## 2023-03-15 RX ADMIN — INSULIN ASPART 1 UNITS: 100 INJECTION, SOLUTION INTRAVENOUS; SUBCUTANEOUS at 09:58

## 2023-03-15 RX ADMIN — TAMSULOSIN HYDROCHLORIDE 0.4 MG: 0.4 CAPSULE ORAL at 08:55

## 2023-03-15 RX ADMIN — DULOXETINE HYDROCHLORIDE 30 MG: 30 CAPSULE, DELAYED RELEASE PELLETS ORAL at 08:55

## 2023-03-15 RX ADMIN — INSULIN ASPART 2 UNITS: 100 INJECTION, SOLUTION INTRAVENOUS; SUBCUTANEOUS at 13:20

## 2023-03-15 RX ADMIN — MODAFINIL 300 MG: 200 TABLET ORAL at 08:55

## 2023-03-15 RX ADMIN — ACETAMINOPHEN 650 MG: 325 TABLET ORAL at 00:18

## 2023-03-15 RX ADMIN — LOPERAMIDE HYDROCHLORIDE 2 MG: 2 CAPSULE ORAL at 11:22

## 2023-03-15 RX ADMIN — HYDROMORPHONE HYDROCHLORIDE 6 MG: 2 TABLET ORAL at 00:18

## 2023-03-15 RX ADMIN — CLOPIDOGREL BISULFATE 75 MG: 75 TABLET ORAL at 08:56

## 2023-03-15 RX ADMIN — ACETAMINOPHEN 650 MG: 325 TABLET ORAL at 05:10

## 2023-03-15 RX ADMIN — HYDROMORPHONE HYDROCHLORIDE 6 MG: 2 TABLET ORAL at 05:10

## 2023-03-15 RX ADMIN — METOPROLOL SUCCINATE 25 MG: 25 TABLET, EXTENDED RELEASE ORAL at 08:55

## 2023-03-15 RX ADMIN — OXYCODONE HYDROCHLORIDE AND ACETAMINOPHEN 2 TABLET: 5; 325 TABLET ORAL at 10:54

## 2023-03-15 RX ADMIN — PREGABALIN 100 MG: 100 CAPSULE ORAL at 08:55

## 2023-03-15 RX ADMIN — BUPROPION HYDROCHLORIDE 150 MG: 150 TABLET, FILM COATED, EXTENDED RELEASE ORAL at 08:56

## 2023-03-15 RX ADMIN — LOSARTAN POTASSIUM 50 MG: 50 TABLET, FILM COATED ORAL at 08:56

## 2023-03-15 RX ADMIN — ASPIRIN 81 MG: 81 TABLET, COATED ORAL at 08:55

## 2023-03-15 RX ADMIN — CEPHALEXIN 500 MG: 500 CAPSULE ORAL at 13:11

## 2023-03-15 ASSESSMENT — ACTIVITIES OF DAILY LIVING (ADL)
ADLS_ACUITY_SCORE: 21
ADLS_ACUITY_SCORE: 20

## 2023-03-15 NOTE — PLAN OF CARE
Goal Outcome Evaluation:      Plan of Care Reviewed With: patient    Overall Patient Progress: no changeOverall Patient Progress: no change     SUMMARY: RLE cellulitis, acute on chronic pain   DATE & TIME: 3/14-03/15 [id number]   Cognitive Concerns/ Orientation : A&OX4  BEHAVIOR & AGGRESSION TOOL COLOR: Green  ABNL VS/O2: VSS on RA  MOBILITY: SBA with cane- bed alarm on, unsteady at times when standing up from bed, pt states it's due to his right leg. Steady when walking. Informed patient to call   PAIN MANAGMENT: right leg pain managed with pain pump and PRN dilaudid Q4.   DIET: Mod/Carb  BOWEL/BLADDER: Continent (incontinent episode x1)   ABNL LAB/BG: , 179  DRAIN/DEVICES: PIV SL, intrathecal pain pump   TELEMETRY RHYTHM: NA  SKIN: BLE red, rosaura.  2-3+ edema. Right leg elevated up on wedge.   TESTS/PROCEDURES: NA  D/C DATE: possibly 03/15 back to home   OTHER IMPORTANT INFO: Pt has a h/o chronic pain.  Has intrathecal pain pump. Humeral neck fx.  Ortho following. Plan to move to oral antibiotics today.

## 2023-03-15 NOTE — DISCHARGE SUMMARY
[facility]  Discharge Summary  Hospitalist    Date of Admission:  3/10/2023  Date of Discharge:  3/15/2023  2:26 PM  Provider:  Dashawn Franklin DO, [facility]    Discharge Diagnoses   1.  RLE Cellulitis  2.  Mechanical fall without new injury  3.  Uncontrolled diabetes mellitus  4.  Acute on chronic pain syndrome  5.  Mild hyponatremia felt related to hydrochlorothiazide and #1/intake    Other medical issues:  Past Medical History:   Diagnosis Date     Acquired lymphedema 2/4/2019     Allergic state      Amyloidosis (H)     followed by hematology Dr. Romeo status post peripheral stem cell transplant     Anxiety 5/11/2016     Anxiety and depression 12/18/2013     Bipolar I disorder (H) 9/1/2015    Under care of psychiatrist [facility]- Dr Rahman doxepin 25 mg, 2 cap bedtime DULoxetine 20 mg once daily  OLANZapine 7.5 mg  1 tab bedtime      Depressive disorder 1995     EKG abnormality 4/20/2020     H/O bone marrow transplant (H)      Headache(784.0)      History of diverticulitis 1/27/2015    1/15-rxed with antibiotics      Hyperlipidemia LDL goal <100 10/31/2010     Hypertension 2007     Hypertension goal BP (blood pressure) < 140/90 10/11/2011     Marianne (H) 8/20/2015     Morbid obesity (H) 8/28/2018     Myalgia and myositis, unspecified      Narcotic dependence (H) 9/6/2016    None in about 6 months- 8/1/17     NSTEMI (non-ST elevated myocardial infarction) (H) 04/19/2020     OCD (obsessive compulsive disorder)      Other acquired absence of organ 94     Other cirrhosis of liver (H) possibly from vences  4/15/2020     Other specified viral warts      Peripheral edema 5/20/2018    Noncardiac Continue with  furosemide (LASIX) 20 MG tablet,  potassium       chloride SA (K-DUR/KLOR-CON M) 10 MEQ CR  Tab once daily  Basic metabolic panel     Polyneuropathy associated with underlying disease (H) 2/4/2019     Restless legs syndrome (RLS) 6/29/2017     Stasis dermatitis of both legs 12/31/2018     Type 2  diabetes mellitus with other specified complication (H) 7/10/2017       History of Present Illness   Parmjit Hernández is an 67 year old male who presented with right leg discomfort/redness.  Please see the admission history and physical for full details.    Hospital Course   Parmjit Hernández was admitted on 3/10/2023.  The following problems were addressed during his hospitalization:    Mr. Hernández presented with worsening redness and discomfort of his right leg.  He also was noted to have uncontrolled diabetes with an A1C over 9.  He was treated with right leg elevation, pain control, and IV ancef.  His leg gradually improved in appearance and pain/edema.  By discharge he was improved and transitioned to oral keflex.  He has chronic loose stool issues on antibiotics and this was treated with probiotics and imodium.  He preferred to stay with a keflex type drug given his good response to it and the fact the loose stools were improved.   He did not have any acute abdominal pain/complaints and was eating well prior to discharge.  Lantus was added to his diabetic regimen he was comfortable with use prior to discharge.  Long term with other medication adjustments outpatient, he may be able to come back off lantus but I emphasized to him the importance of tighter diabetic control to prevent future serious complications and other infections.  The patients pain by discharge was better and he resumed his usual oxycodone and pain pump.  During his stay he required extra oral opioids but the needs decreased as his leg felt better.  Wound care follow-up requested as his leg had some blistering.    Other issues:  The patient had a partial fall off the commode in his room during his stay.  There were no major injuries noted.  He fractured his proximal humerus this winter.  Given the fall it was difficult to determine if there was a change on x-ray with his shoulder.  He denied any major new discomfort.  Orthopedics consulted and a CT  "was obtained.  After further eval they felt the imaging findings were just related to his prior fracture.  He was recommended to continue with therapy and follow-up with his outpatient orthopedic provider.  The patient also initially with his infection and some mild hyponatremia had his hydrochlorothiazide held.  This was resumed by discharge when he felt better and was eating well.  He was briefly given some lasix for his edema though this was stopped.  His volume overload in his legs appeared more local/lyphedema than acute CHF exacerbation during this particular stay.  Please see the medical record for further details of his stay.      Significant Results and Procedures   See below    Pending Results     Unresulted Labs Ordered in the Past 30 Days of this Admission     No orders found from 2/8/2023 to 3/11/2023.          Code Status   Full Code       Primary Care Physician   Sherwin Mcdermott    Blood pressure 129/84, pulse 72, temperature 97.8  F (36.6  C), temperature source Oral, resp. rate 20, height 1.676 m (5' 6\"), weight 114.5 kg (252 lb 8 oz), SpO2 94 %.    RLE had improved erythema and swelling.    Discharge Disposition   Discharged to home    Consultations This Hospital Stay   PAIN MANAGEMENT ADULT IP CONSULT  LYMPHEDEMA THERAPY IP CONSULT  PAIN MANAGEMENT ADULT IP CONSULT  ORTHOPEDIC SURGERY IP CONSULT  CARE MANAGEMENT / SOCIAL WORK IP CONSULT    Time Spent on this Encounter   I, Dashawn Franklin DO, personally saw the patient today and spent greater than 30 minutes discharging this patient.    Discharge Orders      Physical Therapy Referral      Reason for your hospital stay    Leg infection     Follow-up and recommended labs and tests     1.  Follow-up with primary provider in the next 6 days.  2.  Follow-up with wound care/lymphedema clinic in the next week.  3.  Follow-up with your outpatient orthopedics Dr. Andres at [facility] in 3-4 weeks for reevaluation.     Activity    Your " activity upon discharge: activity as tolerated, No lifting more than 3-5 pounds with the left upper extremity. Okay for gentle shoulder range of motion.  Range of motion as tolerated of the left elbow, wrist, and digits.  Keep right leg elevated when able at heart height/above.     When to contact your care team    Call if questions.  Notify provider if fevers, worsening right leg redness, worsening uncontrolled pain, worsening diarrhea or new abdominal pain, blood sugars under 70 or over 400, other new medical concerns.     Discharge Instructions    Hold Trulicity until follow-up with your primary provider.  They can discuss restart timing in conjunction with the insulin and other medication adjustments.     Diet    Follow this diet upon discharge:  Diabetic     Discharge Medications   Current Discharge Medication List      START taking these medications    Details   cephALEXin (KEFLEX) 500 MG capsule Take 1 capsule (500 mg) by mouth every 8 hours for 6 days  Qty: 18 capsule, Refills: 0    Associated Diagnoses: Cellulitis of right lower extremity      glucose 40 % (400 mg/mL) gel Take 15-30 g by mouth every 15 minutes as needed for low blood sugar  Qty: 15 g, Refills: 0    Associated Diagnoses: Type 2 diabetes mellitus without complication, unspecified whether long term insulin use (H)      insulin glargine (LANTUS PEN) 100 UNIT/ML pen Inject 10 Units Subcutaneous At Bedtime  Qty: 15 mL, Refills: 0    Comments: If Lantus is not covered by insurance, may substitute Basaglar or Semglee or other insulin glargine product per insurance preference at same dose and frequency.    Associated Diagnoses: Type 2 diabetes mellitus without complication, unspecified whether long term insulin use (H)      loperamide (IMODIUM) 2 MG capsule Take 1 capsule (2 mg) by mouth 4 times daily as needed for diarrhea    Associated Diagnoses: Loose stools      saccharomyces boulardii (FLORASTOR) 250 MG capsule Take 1 capsule (250 mg) by mouth  2 times daily  Qty: 14 capsule, Refills: 0    Associated Diagnoses: Loose stools         CONTINUE these medications which have NOT CHANGED    Details   !! acetaminophen (TYLENOL) 500 MG tablet Take 1,000 mg by mouth daily as needed for mild pain Takes overnight      !! acetaminophen (TYLENOL) 500 MG tablet Take 500 mg by mouth 2 times daily      aspirin (ASPIRIN LOW DOSE) 81 MG tablet Take 1 tablet (81 mg) by mouth daily  Qty: 30 tablet, Refills: 3    Associated Diagnoses: Hyperlipidemia LDL goal <100; Hypertension goal BP (blood pressure) < 140/90      atorvastatin (LIPITOR) 20 MG tablet Take 1.5 tablets (30 mg) by mouth daily  Qty: 135 tablet, Refills: 3    Associated Diagnoses: Hyperlipidemia LDL goal <100      buPROPion (WELLBUTRIN XL) 150 MG 24 hr tablet Take 150 mg by mouth every morning      cariprazine (VRAYLAR) 4.5 MG capsule Take 4.5 mg by mouth daily      clopidogrel (PLAVIX) 75 MG tablet Take 75 mg by mouth daily      DULoxetine (CYMBALTA) 30 MG capsule Take 30 mg by mouth 2 times daily      hydrochlorothiazide (HYDRODIURIL) 25 MG tablet Take 1.5 tablets (37.5 mg) by mouth daily  Qty: 135 tablet, Refills: 0    Associated Diagnoses: Hypertension goal BP (blood pressure) < 140/90      losartan (COZAAR) 50 MG tablet Take 50 mg by mouth daily      medication given by implanted intrathecal pump continuous Drug # 1: Fentanyl (Sublimaze) - Conc: 2000 mcg/mL - Total Dose / 24 hours: 1561.9 mcg  Drug # 2: Bupivacaine (Marcaine)  - Conc: 20 mg/mL - Total Dose / 24 hours: 15.619 mg  Drug # 3: Morphine (Duramorph or Infumorph)  - Conc: 9 mg/mL - Total Dose / 24 hours: 7.0287 mg    Rate: 0.0325 mL/hr  Pump Reservoir Volume: 40 mL  Outside Clinic & Provider: [facility]  Last Refill Date: 2/22/2023  Next Refill Date: 4/6/2023  Low [place] Alarm Date: 4/4/2023  Pump : Medtronic SynchroMed II    Boluses: Up to 3 per day, 3 minute duration. Lock-out every 6 hours  Fentanyl: 99.9  mcg/dose  Bupivacaine: 0.999 mg/dose  Morphine: 0.4497 mg/dose      metFORMIN (GLUCOPHAGE XR) 500 MG 24 hr tablet Take 2 tablets (1,000 mg) by mouth 2 times daily (with meals)  Qty: 360 tablet, Refills: 1    Associated Diagnoses: Type 2 diabetes mellitus with other specified complication, without long-term current use of insulin (H)      metoprolol succinate ER (TOPROL-XL) 25 MG 24 hr tablet Take 2 tablets (50 mg) by mouth daily  Qty: 180 tablet, Refills: 0    Associated Diagnoses: Hypertension goal BP (blood pressure) < 140/90      modafinil (PROVIGIL) 200 MG tablet Take 1.5 tablets (300 mg) by mouth daily  Qty: 45 tablet, Refills: 0    Associated Diagnoses: Obstructive sleep apnea syndrome      multivitamin w/minerals (THERA-VIT-M) tablet Take 1 tablet by mouth daily Men's 50+      nitroGLYcerin (NITROSTAT) 0.4 MG sublingual tablet Place 0.4 mg under the tongue every 5 minutes as needed for chest pain For chest pain place 1 tablet under the tongue every 5 minutes for 3 doses. If symptoms persist 5 minutes after 1st dose call 911.      oxyCODONE-acetaminophen (PERCOCET) 5-325 MG tablet Take 2 tablets by mouth 2 times daily      pregabalin (LYRICA) 100 MG capsule Take 100 mg by mouth Take up to 4 times per day      senna-docusate (SENOKOT-S/PERICOLACE) 8.6-50 MG tablet Take 1-2 tablets by mouth 2 times daily At baseline, Take 1 tablet in the morning and 2 tablets in the evening. May take second tablet in morning if needed.      SUMAtriptan (IMITREX) 50 MG tablet Take 1 tablet (50 mg) by mouth at onset of headache for migraine May repeat in 2 hours. Max 4 tablets/24 hours.  Qty: 8 tablet, Refills: 1    Associated Diagnoses: Mixed common migraine and muscle contraction headache      tamsulosin (FLOMAX) 0.4 MG capsule Take 1 capsule (0.4 mg) by mouth 2 times daily  Qty: 30 capsule, Refills: 3    Associated Diagnoses: Urinary retention with incomplete bladder emptying      testosterone (ANDROGEL/TESTIM) 50 MG/5GM (1%)  topical gel Place 50 mg of testosterone onto the skin daily      blood glucose (NO BRAND SPECIFIED) lancets standard Use to test blood sugar 1 time daily or as directed.  Qty: 100 lancet, Refills: 4    Associated Diagnoses: Type 2 diabetes mellitus with other specified complication, without long-term current use of insulin (H)      blood glucose (NO BRAND SPECIFIED) test strip Use to test blood sugar 1 time daily or as directed.  Qty: 200 strip, Refills: 4    Associated Diagnoses: Type 2 diabetes mellitus with other specified complication, without long-term current use of insulin (H)      dulaglutide (TRULICITY) 0.75 MG/0.5ML pen Inject 0.75 mg Subcutaneous every 7 days  Qty: 2 mL, Refills: 3    Associated Diagnoses: Type 2 diabetes mellitus with other circulatory complication, without long-term current use of insulin (H)      LORazepam (ATIVAN) 1 MG tablet Take 1 tablet (1 mg) by mouth once as needed for anxiety (Prior to MRI)  Qty: 1 tablet, Refills: 0    Associated Diagnoses: Anxiety       !! - Potential duplicate medications found. Please discuss with provider.      STOP taking these medications       amoxicillin-clavulanate (AUGMENTIN) 875-125 MG tablet Comments:   Reason for Stopping:               Allergies   Allergies   Allergen Reactions     Cephalexin Diarrhea     Liraglutide Other (See Comments), Headache and Unknown     Other reaction(s): Headache, Unknown  PN: migraines  Increased migraine frequency and severity  PN: migraines  Increased migraine frequency and severity  Increased migraine frequency and severity  Increased migraine frequency and severity  PN: migraines  Increased migraine frequency and severity       Sulfa Drugs Swelling and Angioedema     Pt has taken  Taken sulfa drugs orally without trouble. He had problems with Sulfa eye drops. Eye swelled up  Other reaction(s): Other (See Comments)  LW Reaction: eye gts only-red, puffy eye  LW Reaction: eye gts only-red, puffy eye  Eye drops  LW  Reaction: eye gts only-red, puffy eye  Eye drops       Victoza      Increased migraine frequency and severity     Data   Recent Labs   Lab 03/11/23  0701 03/10/23  1238   WBC 8.8 9.8   HGB 12.8* 13.6   HCT 36.9* 38.2*   MCV 94 92    250     7-Day Micro Results     Collected Updated Procedure Result Status      03/12/2023 2143 03/13/2023 0628 C. difficile Toxin B PCR with reflex to C. difficile Antigen and Toxins A/B EIA [[id number]]    Stool from Per Rectum    Final result Component Value   C Difficile Toxin B by PCR Negative   A negative result does not exclude actual disease due to C. difficile and may be due to improper collection, handling and storage of the specimen or the number of organisms in the specimen is below the detection limit of the assay.                Recent Labs   Lab 03/15/23  1226 03/15/23  0907 03/15/23  0144 03/14/23  1321 03/14/23  1102 03/13/23  1227 03/13/23  1053 03/12/23  1629 03/12/23  1433 03/12/23  0805 03/12/23  0616 03/11/23  0801 03/11/23  0701 03/10/23  1632 03/10/23  1238   NA  --   --   --   --  138  --   --   --   --   --  134*  --  131*  --  131*   POTASSIUM  --   --   --   --  5.0  --  4.1  --  4.2  --  3.3*  --  3.2*  --  3.7   CHLORIDE  --   --   --   --  100  --   --   --   --   --  95*  --  90*  --  93*   CO2  --   --   --   --  28  --   --   --   --   --  32*  --  30*  --  28   ANIONGAP  --   --   --   --  10  --   --   --   --   --  7  --  11  --  10   * 140* 179*   < > 195*   < >  --    < >  --    < > 251*   < > 184*   < > 242*   BUN  --   --   --   --  9.0  --   --   --   --   --  18.1  --  20.0  --  17.2   CR  --   --   --   --  0.71  --   --   --   --   --  0.99  --  1.38*  --  0.82   GFRESTIMATED  --   --   --   --  >90  --   --   --   --   --  84  --  56*  --  >90   LUIS  --   --   --   --  8.8  --   --   --   --   --  8.7*  --  8.6*  --  9.2   MAG  --   --   --   --   --   --   --   --   --   --   --   --  2.1  --   --    PROTTOTAL  --   --   --    --   --   --   --   --   --   --   --   --   --   --  6.6   ALBUMIN  --   --   --   --   --   --   --   --   --   --   --   --   --   --  3.7   BILITOTAL  --   --   --   --   --   --   --   --   --   --   --   --   --   --  0.4   ALKPHOS  --   --   --   --   --   --   --   --   --   --   --   --   --   --  138*   AST  --   --   --   --   --   --   --   --   --   --   --   --   --   --  24   ALT  --   --   --   --   --   --   --   --   --   --   --   --   --   --  29    < > = values in this interval not displayed.     Results for orders placed or performed during the hospital encounter of 03/10/23   XR Shoulder Left 2 Views    Narrative    SHOULDER LEFT TWO VIEWS 3/13/2023 10:26 AM     HISTORY: Status post fall with left shoulder impact, history of  proximal humerus fracture with posttraumatic capsulitis.    COMPARISON: None.       Impression    IMPRESSION: There is a proximal humerus fracture which is impacted and  may be an acute on chronic injury as one of the views shows a faint  horizontal lucency through the surgical neck. A dedicated shoulder  series or shoulder CT is recommended for further evaluation, given the  appearance and the patient's history of prior fracture in the past. No  dislocation.     LAZ BAZAN MD         SYSTEM ID:  [id number]   CT Shoulder Left w/o Contrast    Narrative    EXAM: CT SHOULDER LEFT W/O CONTRAST  DATE/TIME: 3/13/2023 4:01 PM    INDICATION: Fracture. Trauma. Pain.  COMPARISON: Left shoulder radiographs 3/13/2023.  TECHNIQUE: Noncontrast. Axial, sagittal and coronal thin-section  reconstruction. Dose reduction techniques were used.     FINDINGS:     BONES:  -Findings there are to reflect a subacute impacted fracture of the  humeral neck (and likely the greater tuberosity) with 7 mm medial  displacement of the distal fragment and surrounding callus formation  (series 5 image 22) and partial osseous bridging. There is some  sclerosis at the fracture margins, suggesting likely  subacute nature  of the fracture. No definite acute fracture lines are identified.  -Moderate acromioclavicular angle and humeral joint osteoarthrosis.  -Cystic change in the greater and lesser tuberosities, which is  suggestive of underlying rotator cuff pathology.    SOFT TISSUES:  -Trace noncalcified atherosclerosis.      Impression    IMPRESSION:  1.  Findings favored to reflect a subacute impacted fracture of the  humeral neck with probable extension to the greater tuberosity with  adjacent callus formation.    JOSE ELIAS RODRIGUEZ MD         SYSTEM ID:  [id number]     *Note: Due to a large number of results and/or encounters for the requested time period, some results have not been displayed. A complete set of results can be found in Results Review.

## 2023-03-15 NOTE — PLAN OF CARE
Goal Outcome Evaluation:     Discharge    Patient discharged to home via private car with friend.  Care plan note:  patient up ad apresh with cane independently, gait steady.  Following lifting restrictions on L shoulder.  Pain improved after resuming his scheduled Percocet.  BG's 140, 172.  He was able to demonstrate use of the insulin pen as he will continue the Lantus at home.  RLE is red/swollen; elevation is encouraged up on the foam wedge.  Has a scabby area of the posterior leg which is dry.  Started on oral antibiotic prior to discharge.  He has several follow up appointments made.      Listed belongings gathered and given to patient (including from security/pharmacy). Yes  Care Plan and Patient education resolved: Yes  Prescriptions if needed, hard copies sent with patient  filled and given to patient at discharge.  Medication Bin checked and emptied on discharge Yes  SW/care coordinator/charge RN aware of discharge: Yes

## 2023-03-15 NOTE — CONSULTS
Care Management Initial Consult    General Information  Assessment completed with: Patient,    Type of CM/SW Visit: Offer D/C Planning    Primary Care Provider verified and updated as needed: Yes   Readmission within the last 30 days:        Reason for Consult: discharge planning  Advance Care Planning:            Communication Assessment  Patient's communication style: spoken language (English or Bilingual)    Hearing Difficulty or Deaf: no   Wear Glasses or Blind: no    Cognitive  Cognitive/Neuro/Behavioral: WDL  Level of Consciousness: alert  Arousal Level: opens eyes spontaneously  Orientation: oriented x 4  Mood/Behavior: calm, cooperative  Best Language: 0 - No aphasia  Speech: clear    Living Environment:   People in home: alone     Current living Arrangements:        Able to return to prior arrangements:         Family/Social Support:  Care provided by: self  Provides care for:    Marital Status: Single             Description of Support System:           Current Resources:   Patient receiving home care services: No     Community Resources:    Equipment currently used at home: none  Supplies currently used at home:      Employment/Financial:  Employment Status: employed full-time        Financial Concerns:        Finance Comments: feels on fixed income    Lifestyle & Psychosocial Needs:  Social Determinants of Health     Tobacco Use: Low Risk      Smoking Tobacco Use: Never     Smokeless Tobacco Use: Never     Passive Exposure: Not on file   Alcohol Use: Not on file   Financial Resource Strain: Not on file   Food Insecurity: Not on file   Transportation Needs: Not on file   Physical Activity: Not on file   Stress: Not on file   Social Connections: Not on file   Intimate Partner Violence: Not on file   Depression: Not at risk     PHQ-2 Score: 0   Housing Stability: Not on file       Functional Status:  Prior to admission patient needed assistance:              Mental Health Status:          Chemical  "Dependency Status:                Values/Beliefs:  Spiritual, Cultural Beliefs, Anabaptism Practices, Values that affect care:                 Additional Information:  Met with patient to discuss role in discharge planning. Pt lives indep in condo with a cat.  Indep at baseline, no services in the home.  Drives and does all own ADL's. 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  Gave him business office number to reach out regarding payment plan.   Pt indicates home w/ OP PT.  Pt in agreement with this plan. Already has Lymphdema scheduled.   Pt has multiple Follow-up appointments scheduled in the next few days with Neuro (MRI needed 1st) , WOC, Vascular, Cardiology. Pt has asked CC to move these appointments out, help schedule PCP and arrange Orthopedics with [facility]. Pt voices no other needs for discharge.  Has a ride coming this afternoon.   Care Management Discharge Note    Discharge Date: 03/15/2023       Discharge Disposition: Home, Outpatient Rehab (PT, OT, SLP, Cardiac or Pulmonary)    Discharge Services:      Discharge DME:      Discharge Transportation: car, drives self, family or friend will provide    Private pay costs discussed: Not applicable    PAS Confirmation Code:    Patient/family educated on Medicare website which has current facility and service quality ratings:      Education Provided on the Discharge Plan:    Persons Notified of Discharge Plans: Pt/bedside RN  Patient/Family in Agreement with the Plan: yes    Handoff Referral Completed: Yes    Additional Information:  Following appointments made and added to AVS.  Cardiology cancelled appointment for today and appears they will reach out to schedule.  Follow-up and recommended labs and tests        1.  Follow-up with primary provider-see below Only am appointments available.   2.  Follow-up with wound care/lymphedema clinic in the next week.- see below   3.  Follow-up with your " "outpatient orthopedics Dr. Andres at [facility] - March 21st 1:30pm with Dr. Gorman's assistant Manuela.   .[address], [place] [address]   Phone: [phone]             Your next 10 appointments already scheduled    Mar 16, 2023  8:50 AM   (Arrive by 8:35 AM)   New Wound Provider with Regino Stanford MD   [facility] [place] ([facility] ) [address]   [address]   [facility] [id number] [phone]      Mar 17, 2023 11:00 AM   (Arrive by 10:45 AM)   New Patient with Charly Hester MD   [facility] Blood and Marrow Transplant Program [place] ([facility] ) [address] [id number] [phone]      Mar 17, 2023  1:45 PM   (Arrive by 1:30 PM)   Lymphedema Evaluation with Avelina Bronson OT   [facility] [place]  ([facility] ) [address]   [address]   [facility] [id number] [phone]   Appointment Details     Please bring the following items with you to your first visit:   - Completed health-history and/or questionnaires included in this packet.   - Insurance Card(s)   - Personal Identification     Cancellations:   A 24-hour notice to our clinic is requested for all cancellations. Please call the clinic to cancel or reschedule your appointment.      If you arrive more than 15 minutes late to your appointment, you will be asked to reschedule.    Mar 20, 2023 10:30 AM   (Arrive by 10:15 AM)   ED/Hospital Follow Up with Sherwin Mcdermott DO   [facility] ([facility] ) [address], [address]   [facility] [address] [phone]   Please plan to arrive at the clinic at your \"Arrive By\" time for your appointment. Our late policy will be enforced based on this time.   Mar 24, 2023 11:00 AM   Lymphedema Treatment with BRYANNA Lucero [facility] " [facility] [place]  ([facility] ) [address]   [address]   [facility] [address]   [phone]      Mar 27, 2023 12:00 PM   (Arrive by 11:30 AM)   MR BRAIN W/O & W CONTRAST with EICMR1   [facility] ([facility]) [address] [address]   [phone]      Mar 31, 2023  3:00 PM   (Arrive by 2:45 PM)   Return Vascular Patient with Rodrick Ferrer MD   [facility] [place] ([facility] ) [address]   [facility] [address] [phone]      Apr 14, 2023  3:00 PM   (Arrive by 2:45 PM)   New Visit with Dylan Jose MD   [facility] ([facility] ) [address]   [facility] [id number] [phone]              Bedside RN to review AVS.         Tonya Gonzalez, SERGEY    ,

## 2023-03-15 NOTE — PLAN OF CARE
Physical Therapy Discharge Summary    Reason for therapy discharge:    Discharged to home with outpatient therapy.    Progress towards therapy goal(s). See goals on Care Plan in [facility] electronic health record for goal details.  Goals partially met.  Barriers to achieving goals:   discharge from facility.    Therapy recommendation(s):    Continued therapy is recommended.  Rationale/Recommendations:  to continue addressing lymphedema needs as well as mobility needs for transfers and ambulation. .

## 2023-03-16 ENCOUNTER — HOSPITAL ENCOUNTER (OUTPATIENT)
Dept: WOUND CARE | Facility: CLINIC | Age: 68
Discharge: HOME | End: 2023-03-16
Attending: SURGERY | Admitting: SURGERY
Payer: COMMERCIAL

## 2023-03-16 ENCOUNTER — TELEPHONE (OUTPATIENT)
Dept: CARDIOLOGY | Facility: CLINIC | Age: 68
End: 2023-03-16

## 2023-03-16 VITALS
WEIGHT: 255 LBS | BODY MASS INDEX: 40.98 KG/M2 | HEIGHT: 66 IN | DIASTOLIC BLOOD PRESSURE: 98 MMHG | SYSTOLIC BLOOD PRESSURE: 187 MMHG | TEMPERATURE: 97.2 F | HEART RATE: 82 BPM

## 2023-03-16 DIAGNOSIS — I73.9 PERIPHERAL VASCULAR DISEASE (H): ICD-10-CM

## 2023-03-16 DIAGNOSIS — L97.911 CHRONIC ULCER OF RIGHT LEG, LIMITED TO BREAKDOWN OF SKIN (H): ICD-10-CM

## 2023-03-16 DIAGNOSIS — S81.009A OPEN WOUND OF KNEE, LEG, AND ANKLE, UNSPECIFIED LATERALITY, INITIAL ENCOUNTER: ICD-10-CM

## 2023-03-16 DIAGNOSIS — S91.009A OPEN WOUND OF KNEE, LEG, AND ANKLE, UNSPECIFIED LATERALITY, INITIAL ENCOUNTER: ICD-10-CM

## 2023-03-16 DIAGNOSIS — S81.809A OPEN WOUND OF KNEE, LEG, AND ANKLE, UNSPECIFIED LATERALITY, INITIAL ENCOUNTER: ICD-10-CM

## 2023-03-16 PROBLEM — D12.3 BENIGN NEOPLASM OF TRANSVERSE COLON: Status: ACTIVE | Noted: 2018-08-03

## 2023-03-16 PROBLEM — K63.5 POLYP OF COLON: Status: ACTIVE | Noted: 2018-08-01

## 2023-03-16 PROBLEM — G62.9 POLYNEUROPATHY, UNSPECIFIED: Status: ACTIVE | Noted: 2019-02-04

## 2023-03-16 PROBLEM — S20.219A CONTUSION OF RIB: Status: ACTIVE | Noted: 2022-11-28

## 2023-03-16 PROBLEM — G89.29 CHRONIC PAIN: Status: ACTIVE | Noted: 2022-11-28

## 2023-03-16 PROBLEM — I25.10 CORONARY ARTERIOSCLEROSIS: Status: ACTIVE | Noted: 2022-11-28

## 2023-03-16 PROBLEM — D12.0 BENIGN NEOPLASM OF CECUM: Status: ACTIVE | Noted: 2018-08-03

## 2023-03-16 PROBLEM — Z86.0100 HISTORY OF COLONIC POLYPS: Status: ACTIVE | Noted: 2018-08-01

## 2023-03-16 PROBLEM — R94.39 CARDIOVASCULAR STRESS TEST ABNORMAL: Status: ACTIVE | Noted: 2022-11-28

## 2023-03-16 PROBLEM — M48.061 SPINAL STENOSIS AT L4-L5 LEVEL: Status: ACTIVE | Noted: 2020-08-05

## 2023-03-16 PROBLEM — Z87.892: Status: ACTIVE | Noted: 2022-11-28

## 2023-03-16 PROBLEM — R79.89 DECREASED TESTOSTERONE LEVEL: Status: ACTIVE | Noted: 2022-11-28

## 2023-03-16 PROBLEM — I83.009 STASIS ULCER (H): Status: ACTIVE | Noted: 2022-10-31

## 2023-03-16 PROBLEM — L97.909 STASIS ULCER (H): Status: ACTIVE | Noted: 2022-10-31

## 2023-03-16 PROBLEM — G98.8 DISORDER OF NERVOUS SYSTEM DUE TO TYPE 2 DIABETES MELLITUS (H): Status: ACTIVE | Noted: 2022-11-28

## 2023-03-16 PROBLEM — R33.9 RETENTION OF URINE: Status: ACTIVE | Noted: 2018-12-24

## 2023-03-16 PROBLEM — R07.9 CHEST PAIN: Status: ACTIVE | Noted: 2018-04-01

## 2023-03-16 PROBLEM — G61.0 GUILLAIN-BARRE SYNDROME (H): Status: ACTIVE | Noted: 2020-11-07

## 2023-03-16 PROBLEM — H52.7 DISORDER OF REFRACTION: Status: ACTIVE | Noted: 2022-11-28

## 2023-03-16 PROBLEM — M51.369 DDD (DEGENERATIVE DISC DISEASE), LUMBAR: Status: ACTIVE | Noted: 2020-08-06

## 2023-03-16 PROBLEM — K74.69 OTHER CIRRHOSIS OF LIVER (H): Status: ACTIVE | Noted: 2020-04-15

## 2023-03-16 PROBLEM — R60.0 LEG EDEMA: Status: ACTIVE | Noted: 2018-05-20

## 2023-03-16 PROBLEM — K59.03 DRUG INDUCED CONSTIPATION: Status: ACTIVE | Noted: 2022-11-28

## 2023-03-16 PROBLEM — H26.9 BILATERAL CATARACTS: Status: ACTIVE | Noted: 2022-11-28

## 2023-03-16 PROBLEM — E83.51 HYPOCALCEMIA: Status: ACTIVE | Noted: 2022-11-28

## 2023-03-16 PROBLEM — F31.11 BIPOLAR 1 DISORDER, MANIC, MILD (H): Status: ACTIVE | Noted: 2022-11-29

## 2023-03-16 PROBLEM — I21.4 NSTEMI (NON-ST ELEVATED MYOCARDIAL INFARCTION) (H): Status: ACTIVE | Noted: 2018-04-07

## 2023-03-16 PROBLEM — I12.9 HYPERTENSIVE RENAL DISEASE: Status: ACTIVE | Noted: 2022-11-28

## 2023-03-16 PROBLEM — F11.20 CONTINUOUS OPIOID DEPENDENCE (H): Status: ACTIVE | Noted: 2020-08-06

## 2023-03-16 PROBLEM — R10.11 RIGHT UPPER QUADRANT PAIN: Status: ACTIVE | Noted: 2017-09-05

## 2023-03-16 PROBLEM — K43.9 HERNIA OF ANTERIOR ABDOMINAL WALL: Status: ACTIVE | Noted: 2022-11-28

## 2023-03-16 PROBLEM — Z95.828 PRESENCE OF IMPLANTED INFUSION PUMP: Status: ACTIVE | Noted: 2020-08-06

## 2023-03-16 PROBLEM — N40.0 BENIGN PROSTATIC HYPERPLASIA: Status: ACTIVE | Noted: 2022-11-28

## 2023-03-16 PROBLEM — M62.81 MUSCLE WEAKNESS (GENERALIZED): Status: ACTIVE | Noted: 2020-10-20

## 2023-03-16 PROBLEM — E11.69 DISORDER OF NERVOUS SYSTEM DUE TO TYPE 2 DIABETES MELLITUS (H): Status: ACTIVE | Noted: 2022-11-28

## 2023-03-16 PROBLEM — K75.81 NONALCOHOLIC STEATOHEPATITIS: Status: ACTIVE | Noted: 2020-05-05

## 2023-03-16 PROBLEM — G89.18 ACUTE POSTOPERATIVE PAIN: Status: ACTIVE | Noted: 2020-08-06

## 2023-03-16 PROBLEM — K52.9 CHRONIC DIARRHEA: Status: ACTIVE | Noted: 2022-11-28

## 2023-03-16 PROBLEM — I87.2 VENOUS STASIS ULCER OF OTHER PART OF LEFT LOWER LEG LIMITED TO BREAKDOWN OF SKIN WITHOUT VARICOSE VEINS (H): Status: ACTIVE | Noted: 2022-10-31

## 2023-03-16 PROBLEM — L97.821 VENOUS STASIS ULCER OF OTHER PART OF LEFT LOWER LEG LIMITED TO BREAKDOWN OF SKIN WITHOUT VARICOSE VEINS (H): Status: ACTIVE | Noted: 2022-10-31

## 2023-03-16 PROBLEM — R53.82 CHRONIC FATIGUE, UNSPECIFIED: Status: ACTIVE | Noted: 2020-11-07

## 2023-03-16 PROCEDURE — 11042 DBRDMT SUBQ TIS 1ST 20SQCM/<: CPT | Performed by: SURGERY

## 2023-03-16 PROCEDURE — 97597 DBRDMT OPN WND 1ST 20 CM/<: CPT | Performed by: SURGERY

## 2023-03-16 PROCEDURE — G0463 HOSPITAL OUTPT CLINIC VISIT: HCPCS | Mod: 25

## 2023-03-16 PROCEDURE — 99204 OFFICE O/P NEW MOD 45 MIN: CPT | Mod: 25 | Performed by: SURGERY

## 2023-03-16 RX ORDER — AMMONIUM LACTATE 12 G/100G
CREAM TOPICAL 2 TIMES DAILY
Qty: 385 G | Refills: 1 | Status: SHIPPED | OUTPATIENT
Start: 2023-03-16 | End: 2023-03-31

## 2023-03-16 RX ORDER — METHYLDOPA 250 MG
1000 TABLET ORAL DAILY
Qty: 90 TABLET | Refills: 3 | Status: SHIPPED | OUTPATIENT
Start: 2023-03-16 | End: 2024-08-01

## 2023-03-16 NOTE — PROGRESS NOTES
[facility] [place] Progress Note    Subject: Parmjit Hernández potation for right great toe wound in setting of chronic lymphedema, medical record reviewed.  Was discharged from the hospital on March 15, admission date March 10 for management of right lower extremity cellulitis in setting of chronic bilateral extremity lymphedema, history of hypertension, hyperlipidemia, adult onset diabetes, no tobacco utilization.  He has seen previously certified lymphedema therapist, is utilize compression previously.  Not on amlodipine or Norvasc.  Admission white blood cell count was 9.8, hemoglobin 13.6, creatinine 0.8 with albumin of 3.7.  Right great toe significant callus with ulceration.  No history of heel ulceration.  He was initiated on EdemaWear during hospitalization, IV antibiotics transition to oral antibiotics.  Denies history of deep venous thrombosis.  Right lower extremity duplex ultrasound on March 9 demonstrated no evidence of deep venous thrombosis in the right leg.  No notation of duplex ultrasound for venous insufficiency.  Previous left lower extremity duplex ultrasound April 23, 2021 demonstrated no evidence of deep venous thromboses.  Ankle-brachial indices performed on June 1, 2021 were within normal limits with triphasic waveforms, no evidence of peripheral chill disease.  PMH:   Past Medical History:   Diagnosis Date     Acquired lymphedema 2/4/2019     Allergic state      Amyloidosis (H)     followed by hematology Dr. Romeo status post peripheral stem cell transplant     Anxiety 5/11/2016     Anxiety and depression 12/18/2013     Bipolar I disorder (H) 9/1/2015    Under care of psychiatrist [facility]- Dr Rahman doxepin 25 mg, 2 cap bedtime DULoxetine 20 mg once daily  OLANZapine 7.5 mg  1 tab bedtime      DDD (degenerative disc disease), lumbar 8/6/2020     Depressive disorder 1995     EKG abnormality 4/20/2020     H/O bone marrow transplant (H)      Headache(784.0)      History of  diverticulitis 1/27/2015    1/15-rxed with antibiotics      Hyperlipidemia LDL goal <100 10/31/2010     Hypertension 2007     Hypertension goal BP (blood pressure) < 140/90 10/11/2011     Marianne (H) 8/20/2015     Morbid obesity (H) 8/28/2018     Myalgia and myositis, unspecified      Narcotic dependence (H) 9/6/2016    None in about 6 months- 8/1/17     NSTEMI (non-ST elevated myocardial infarction) (H) 04/19/2020     OCD (obsessive compulsive disorder)      Other acquired absence of organ 94     Other cirrhosis of liver (H) possibly from vences  4/15/2020     Other specified viral warts      Peripheral edema 5/20/2018    Noncardiac Continue with  furosemide (LASIX) 20 MG tablet,  potassium       chloride SA (K-DUR/KLOR-CON M) 10 MEQ CR  Tab once daily  Basic metabolic panel     Polyneuropathy associated with underlying disease (H) 2/4/2019     Restless legs syndrome (RLS) 6/29/2017     Stasis dermatitis of both legs 12/31/2018     Type 2 diabetes mellitus with other specified complication (H) 7/10/2017     Patient Active Problem List   Diagnosis     Low back pain     Hyperlipidemia     Hypertension goal BP (blood pressure) < 140/90     Type 2 diabetes mellitus (H)     Advanced directives, counseling/discussion     Migraine headache     History of diverticulitis     MENTAL HEALTH     Bipolar II disorder (H)     Generalized abdominal pain     Light chain (AL) amyloidosis (H)     Insomnia due to medical condition     Obstructive sleep apnea syndrome     Restless legs syndrome (RLS)     Leg edema     Morbid obesity (H)     Venous stasis dermatitis of both lower extremities     Polyneuropathy, unspecified     Acquired lymphedema     Low blood pressure reading     History of peripheral stem cell transplant (H)     Other specified anxiety disorders     Abnormal electrocardiography     Bilateral leg edema     Onychomycosis     Thrombocytopenia, unspecified (H)     Muscle weakness (generalized)     Bilateral leg weakness      NSTEMI (non-ST elevated myocardial infarction) (H)     Hyperkalemia     Essential hypertension     Shortness of breath     Chest pain     Peripheral vascular disease (H)     Other cirrhosis of liver (H)     Lymphedema     Cellulitis of right lower extremity     Right upper quadrant pain     Abnormal computed tomography scan     Acquired pancytopenia (H)     Acute postoperative pain     Benign neoplasm of cecum     Benign neoplasm of transverse colon     Benign prostatic hyperplasia     Bilateral cataracts     Bipolar 1 disorder, manic, mild (H)     Cannabis dependence in remission (H)     Cardiovascular stress test abnormal     Chronic fatigue, unspecified     Continuous opioid dependence (H)     Contusion of rib     Coronary arteriosclerosis     DDD (degenerative disc disease), lumbar     Decreased testosterone level     Depressed bipolar I disorder in full remission (H)     Chronic diarrhea     Hernia of anterior abdominal wall     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)     Disorder of refraction     Diverticular disease of colon     Drug induced constipation     Elevated troponin     Guillain-Lulu syndrome (H)     History of anaphylaxis due to severe acute respiratory syndrome coronavirus 2 (SARS-CoV-2) vaccine     History of colonic polyps     Hypertensive renal disease     Hypocalcemia     Immunoglobulin deficiency (H)     Localized enlarged lymph nodes     Nonalcoholic steatohepatitis     Noninfectious gastroenteritis     Other muscle spasm     Polyp of colon     Presence of implanted infusion pump     Retention of urine     Spinal stenosis at L4-L5 level     Stasis ulcer (H)     Chronic pain     Cerebral infarction, unspecified (H)     Venous stasis ulcer of other part of left lower leg limited to breakdown of skin without varicose veins (H)     Chronic ulcer of right leg, limited to breakdown of skin (H)     Social Hx:   Social History     Socioeconomic History     Marital status:  Single     Spouse name: Not on file     Number of children: 3     Years of education: Not on file     Highest education level: Not on file   Occupational History     Employer: [employer]   Tobacco Use     Smoking status: Never     Smokeless tobacco: Never   Vaping Use     Vaping Use: Never used   Substance and Sexual Activity     Alcohol use: Not Currently     Alcohol/week: 0.0 standard drinks     Drug use: Not Currently     Types: Cocaine, Marijuana, Methamphetamines, Opiates, IV, Amphetamines, Barbiturates, Benzodiazepines, Codeine, Fentanyl, Hashish, Hydrocodone, Hydromorphone, LSD, Oxycodone     Sexual activity: Not Currently     Partners: Female     Birth control/protection: Abstinence   Other Topics Concern     Parent/sibling w/ CABG, MI or angioplasty before 65F 55M? No   Social History Narrative    Social Documentation:05/27/2010        Balanced Diet: NO    Calcium intake: 3-4 per day    Caffeine: 2 per day    Exercise:  type of activity NO    Sunscreen: Yes    Seatbelts:  Yes    Self Breast Exam:  No - na    Self Testicular Exam: no    Physical/Emotional/Sexual Abuse: No     Do you feel safe in your environment? Yes        Cholesterol screen up to date: Yes    Eye Exam up to date: Yes    Dental Exam up to date: Yes    Pap smear up to date: Does Not Apply    Mammogram up to date: Does Not Apply    Dexa Scan up to date:NO    Colonoscopy up to date:2007    Immunizations up to date: YES    Glucose screen if over 40: Yes        Sofi Rosas CMA                 Social Determinants of Health     Financial Resource Strain: Not on file   Food Insecurity: Not on file   Transportation Needs: Not on file   Physical Activity: Not on file   Stress: Not on file   Social Connections: Not on file   Intimate Partner Violence: Not on file   Housing Stability: Not on file       Surgical Hx:   Past Surgical History:   Procedure Laterality Date     ABDOMEN SURGERY  2007    abdominal hernia     BACK SURGERY  8/6/2020      BIOPSY  june 2015    negative     BMT PROTOCOL      for systemic amyloidosis     BONE MARROW BIOPSY, BONE SPECIMEN, NEEDLE/TROCAR N/A 6/8/2016    Procedure: BIOPSY BONE MARROW;  Surgeon: Nathan Agrawal MD;  Location:  GI     BONE MARROW BIOPSY, BONE SPECIMEN, NEEDLE/TROCAR N/A 2/20/2019    Procedure: BIOPSY BONE MARROW;  Surgeon: Michael Raygoza MD;  Location:  GI     CHOLECYSTECTOMY  1995    lap qian     COLONOSCOPY  2012    hx polyps     ESOPHAGOSCOPY, GASTROSCOPY, DUODENOSCOPY (EGD), COMBINED N/A 5/29/2015    Procedure: COMBINED ESOPHAGOSCOPY, GASTROSCOPY, DUODENOSCOPY (EGD), BIOPSY SINGLE OR MULTIPLE;  Surgeon: John Jacob MD;  Location:  GI     HERNIA REPAIR, UMBILICAL  2006     [facility] NONSPECIFIC PROCEDURE  94    Cholecystectomy     [facility] NONSPECIFIC PROCEDURE  2000    repair deviated septum       Allergies:    Allergies   Allergen Reactions     Cephalexin Diarrhea     Liraglutide Other (See Comments), Headache and Unknown     Other reaction(s): Headache, Unknown  PN: migraines  Increased migraine frequency and severity  PN: migraines  Increased migraine frequency and severity  Increased migraine frequency and severity  Increased migraine frequency and severity  PN: migraines  Increased migraine frequency and severity       Sulfa Drugs Swelling and Angioedema     Pt has taken  Taken sulfa drugs orally without trouble. He had problems with Sulfa eye drops. Eye swelled up  Other reaction(s): Other (See Comments)  LW Reaction: eye gts only-red, puffy eye  LW Reaction: eye gts only-red, puffy eye  Eye drops  LW Reaction: eye gts only-red, puffy eye  Eye drops       Victoza      Increased migraine frequency and severity       Medications:   Current Outpatient Medications   Medication     ammonium lactate (AMLACTIN) 12 % external cream     Bioflavonoid Products (CANDIDO-C) TABS     Folic Acid-Vit B6-Vit B12 0.5-5-0.2 MG TABS     vitamin D3 (CHOLECALCIFEROL) 1.25 MG (54975 UT)  capsule     acetaminophen (TYLENOL) 500 MG tablet     acetaminophen (TYLENOL) 500 MG tablet     aspirin (ASPIRIN LOW DOSE) 81 MG tablet     atorvastatin (LIPITOR) 20 MG tablet     blood glucose (NO BRAND SPECIFIED) lancets standard     blood glucose (NO BRAND SPECIFIED) test strip     buPROPion (WELLBUTRIN XL) 150 MG 24 hr tablet     cariprazine (VRAYLAR) 4.5 MG capsule     cephALEXin (KEFLEX) 500 MG capsule     clopidogrel (PLAVIX) 75 MG tablet     dulaglutide (TRULICITY) 0.75 MG/0.5ML pen     DULoxetine (CYMBALTA) 30 MG capsule     glucose 40 % (400 mg/mL) gel     hydrochlorothiazide (HYDRODIURIL) 25 MG tablet     insulin glargine (LANTUS PEN) 100 UNIT/ML pen     loperamide (IMODIUM) 2 MG capsule     LORazepam (ATIVAN) 1 MG tablet     losartan (COZAAR) 50 MG tablet     medication given by implanted intrathecal pump     metFORMIN (GLUCOPHAGE XR) 500 MG 24 hr tablet     metoprolol succinate ER (TOPROL-XL) 25 MG 24 hr tablet     modafinil (PROVIGIL) 200 MG tablet     multivitamin w/minerals (THERA-VIT-M) tablet     nitroGLYcerin (NITROSTAT) 0.4 MG sublingual tablet     oxyCODONE-acetaminophen (PERCOCET) 5-325 MG tablet     pregabalin (LYRICA) 100 MG capsule     saccharomyces boulardii (FLORASTOR) 250 MG capsule     senna-docusate (SENOKOT-S/PERICOLACE) 8.6-50 MG tablet     SUMAtriptan (IMITREX) 50 MG tablet     tamsulosin (FLOMAX) 0.4 MG capsule     testosterone (ANDROGEL/TESTIM) 50 MG/5GM (1%) topical gel     No current facility-administered medications for this encounter.       Labs:   Recent Labs   Lab Test 03/11/23  0701 03/10/23  1238 03/09/23  0832 02/23/23  1227 12/11/20  1004 12/05/20  2205 04/18/20  0730 04/17/20 2009   ALBUMIN  --  3.7  --  4.2   < >  --    < > 3.0*   HGB 12.8* 13.6   < > 14.3   < > 10.9*   < > 11.5*   INR  --   --   --   --   --   --   --  1.18*   WBC 8.8 9.8   < > 6.9   < > 4.9   < > 9.2   A1C  --   --   --  9.3*   < >  --    < >  --    CRP  --   --   --   --   --  4.0   < >  --     < >  "= values in this interval not displayed.     Creatinine   Date Value Ref Range Status   03/14/2023 0.71 0.67 - 1.17 mg/dL Final   06/01/2021 0.88 0.66 - 1.25 mg/dL Final     GFR Estimate   Date Value Ref Range Status   03/14/2023 >90 >60 mL/min/1.73m2 Final     Comment:     eGFR calculated using 2021 CKD-EPI equation.   06/01/2021 >90 >60 mL/min/[1.73_m2] Final     Comment:     Non  GFR Calc  Starting 12/18/2018, serum creatinine based estimated GFR (eGFR) will be   calculated using the Chronic Kidney Disease Epidemiology Collaboration   (CKD-EPI) equation.       GFR Estimate If Black   Date Value Ref Range Status   06/01/2021 >90 >60 mL/min/[1.73_m2] Final     Comment:      GFR Calc  Starting 12/18/2018, serum creatinine based estimated GFR (eGFR) will be   calculated using the Chronic Kidney Disease Epidemiology Collaboration   (CKD-EPI) equation.       Lab Results   Component Value Date    WBC 8.8 03/11/2023    WBC 5.5 06/01/2021     Lab Results   Component Value Date    RBC 3.93 03/11/2023    RBC 4.29 06/01/2021     Lab Results   Component Value Date    HGB 12.8 03/11/2023    HGB 13.5 06/01/2021     Lab Results   Component Value Date    HCT 36.9 03/11/2023    HCT 39.1 06/01/2021     No components found for: MCT  Lab Results   Component Value Date    MCV 94 03/11/2023    MCV 91 06/01/2021     Lab Results   Component Value Date    MCH 32.6 03/11/2023    MCH 31.5 06/01/2021     Lab Results   Component Value Date    MCHC 34.7 03/11/2023    MCHC 34.5 06/01/2021     Lab Results   Component Value Date    RDW 13.6 03/11/2023    RDW 12.9 06/01/2021     Lab Results   Component Value Date     03/11/2023     06/01/2021          Nutrition requirements were discussed with patient today.  Objective:  BP (!) 187/98 (BP Location: Left arm, Patient Position: Sitting)   Pulse 82   Temp 97.2  F (36.2  C) (Oral)   Ht 1.676 m (5' 6\")   Wt 115.7 kg (255 lb)   BMI 41.16 kg/m    Wound " (used by [facility] only) 03/16/23 0911 Right lateral;lower leg ulceration, venous (Active)   Thickness/Stage partial thickness 03/16/23 0900   Base scab 03/16/23 0900   Periwound redness;swelling 03/16/23 0900   Periwound Temperature warm 03/16/23 0900   Periwound Skin Turgor soft 03/16/23 0900   Edges open 03/16/23 0900   Length (cm) 0.4 03/16/23 0900   Width (cm) 3.5 03/16/23 0900   Depth (cm) 0.2 03/16/23 0900   Wound (cm^2) 1.4 cm^2 03/16/23 0900   Wound Volume (cm^3) 0.28 cm^3 03/16/23 0900   Drainage Characteristics/Odor serosanguineous 03/16/23 0900   Drainage Amount scant 03/16/23 0900   Care, Wound non-select wound debridement performed 03/16/23 0900       Wound (used by [facility] only) 03/16/23 0911 Right 1 toe neuropathic ulcer (Active)   Thickness/Stage full thickness 03/16/23 0900   Base dry;other (see comments) 03/16/23 0900   Periwound dry 03/16/23 0900   Periwound Temperature cool 03/16/23 0900   Periwound Skin Turgor firm 03/16/23 0900   Edges callused 03/16/23 0900   Length (cm) 0.2 03/16/23 0900   Width (cm) 1.7 03/16/23 0900   Depth (cm) 0.4 03/16/23 0900   Wound (cm^2) 0.34 cm^2 03/16/23 0900   Wound Volume (cm^3) 0.14 cm^3 03/16/23 0900   Drainage Characteristics/Odor serosanguineous 03/16/23 0900   Drainage Amount scant 03/16/23 0900   Care, Wound debrided 03/16/23 0900       Wound (used by [facility] only) 03/16/23 0912 Right medial 1 metatarsal head neuropathic ulcer (Active)   Thickness/Stage Stage 2 03/16/23 0900   Base scab 03/16/23 0900   Periwound intact 03/16/23 0900   Periwound Temperature cool 03/16/23 0900   Periwound Skin Turgor soft 03/16/23 0900   Edges open 03/16/23 0900   Length (cm) 1.6 03/16/23 0900   Width (cm) 0.5 03/16/23 0900   Depth (cm) 0 03/16/23 0900   Wound (cm^2) 0.8 cm^2 03/16/23 0900   Wound Volume (cm^3) 0 cm^3 03/16/23 0900   Drainage Amount none 03/16/23 0900   Care, Wound wound cleanser 03/16/23 0900        General:  Patient is alert and orientated, no acute distress.   Conversant, 67-year-old male, palpable bilateral pedal pulses, bilateral extremity lymphedema tarda, significant callus with ulceration right medial great toe, thickened bilateral toenails.  Left lower extremity lymphedema, mild cellulitis left lower extremity and right lower extremity, no left heel ulcerations.  Examination between the toes of the right foot reveals no ulcerations.        Procedure:   Patient was determined to be capable of making their own medical decisions and informed consent was obtained, topical anesthetic of 4% lidocaine was applied, debridement was performed.  Sharp knife and rongeur was used to debride ulcer down to and including subcutaneous tissue at the right great toe, significant callus removed,. Total cm squared debrided was approximately 7 cm . Bleeding controlled with light pressure. Patient tolerated procedure well.    Impression: Lymphedema tarda, adult-onset diabetes, recent hospitalization for right leg cellulitis, significant right great toe callus with ulceration as probable source for right leg cellulitis  Plan: Patient has financial limitations for utilization of hypochlorous acid Vashe, utilize homemade acetic acid on right great toe, and also wrap around right and left calves to decrease bioburden, perform twice daily, wound dressing to right great toe, keep callus trimmed to prevent further fissuring, could utilize pumice stone, utilize AmLactin hydroxyurea cream.  He has follow-up with certified lymphedema therapist tomorrow.  He will follow-up with vascular medicine.  They can discuss utilization of types of compression for reduction and subsequent maintenance and can discuss indications for utilization of a MLD like lymphedema pump device.  Patient will follow-up in wound clinic as needed.       Further instructions from your care team       Parmjit Hernández      1955    A DME order was not completed because supplies were not needed    Medications/supplements to aid  in healin. Vitamin D3 5,000 iu per day  2. Rosalia C 1,000 mg take daily  3. Vitamin B Complex with folic acid take 1 tablet daily   4. Vitamin B 12 1000 mcg daily  5. Lymphatic Formula 1000: Take one (1) 500mg capsule daily for the rest of your life.    Continue nail and callus care (appointment later this week). Callus care is essential to preventing skin infections.  Continue lymphedema therapy (Avelina is fabulous!)  Follow up with Dr. Ferrer as scheduled.  They will discuss a lymphedema pump.    Wound care recommendations to right 1st toe and right lateral lower leg, and skin care to both feet/legs, twice daily:  - Cleanse both legs with mild unscented soap and water (such as Cetaphil, Cerave or Dove)    - Soak in vinegar solution for 15 minutes (Wrap paper towels around both feet and legs, then pour vinegar solution on)  - Use pumice stone on toe when callus is present  - Apply AmLactin to feet and legs  - Then apply Yellow Stripe EdemaWear from toes to knee    How to prepare the vinegar solution:  1. Mix 2 tablespoons of white household vinegar with 2 cups of water  2. Store in the refrigerator and use for up to 5 days     EdemaWear should be worn 24/7 unless bathing/showering or changing the dressing. You will wash and reuse the EdemaWear. DO NOT CUT THE EDEMAWEAR. IF IT IS TOO LONG THEN CUFF THE EDEMAWEAR (the EdemaWear can shrink length wise with washing).     Compression: Edemawear + compression per Lymphedema therapy    Walk as much as you can, as you are able. When you sit raise your ankle above your hips to promote wound healing.     Keep blood sugars below 150 and A1C below 7        M. Jose F Stanford M.D. 2023      Call us at [phone] if you have any questions about your wounds, have redness or swelling around your wound, have a fever of 101 or greater or if you have any other problems or concerns. We answer the phone Monday through Friday 8 am to 4 pm, please leave a message as we check  the voicemail frequently throughout the day.     If you had a positive experience please indicate that on your patient satisfaction survey form that [facility] will be sending you.    It was a pleasure meeting with you today.  Thank you for allowing me and my team the privilege of caring for you today.  YOU are the reason we are here, and I truly hope we provided you with the excellent service you deserve.  Please let us know if there is anything else we can do for you so that we can be sure you are leaving completely satisfied with your care experience.      If you have any billing related questions please call the [facility] Business office at [phone]. The clinic staff does not handle billing related matters.    If you are scheduled to have a follow up appointment, you will receive a reminder call the day before your visit. On the appointment day please arrive 15 minutes prior to your appointment time. If you are unable to keep that appointment, please call the clinic to cancel or reschedule. If you are more than 10 minutes late or greater for your appointment, the clinic policy is that you may be asked to reschedule.             Regino Stanford MD on 3/16/2023 at 11:26 AM              Dictated using Dragon voice recognition software which may result in transcription errors

## 2023-03-16 NOTE — DISCHARGE INSTRUCTIONS
Parmjit Hernández      1955    A DME order was not completed because supplies were not needed    Medications/supplements to aid in healing:  Vitamin D3 5,000 iu per day  Rosalia C 1,000 mg take daily  Vitamin B Complex with folic acid take 1 tablet daily   Vitamin B 12 1000 mcg daily  5. Lymphatic Formula 1000: Take one (1) 500mg capsule daily for the rest of your life.    Continue nail and callus care (appointment later this week). Callus care is essential to preventing skin infections.  Continue lymphedema therapy (Avelina is fabulous!)  Follow up with Dr. Ferrer as scheduled.  They will discuss a lymphedema pump.    Wound care recommendations to right 1st toe and right lateral lower leg, and skin care to both feet/legs, twice daily:  - Cleanse both legs with mild unscented soap and water (such as Cetaphil, Cerave or Dove)    - Soak in vinegar solution for 15 minutes (Wrap paper towels around both feet and legs, then pour vinegar solution on)  - Use pumice stone on toe when callus is present  - Apply AmLactin to feet and legs  - Then apply Yellow Stripe EdemaWear from toes to knee    How to prepare the vinegar solution:  1. Mix 2 tablespoons of white household vinegar with 2 cups of water  2. Store in the refrigerator and use for up to 5 days     EdemaWear should be worn 24/7 unless bathing/showering or changing the dressing. You will wash and reuse the EdemaWear. DO NOT CUT THE EDEMAWEAR. IF IT IS TOO LONG THEN CUFF THE EDEMAWEAR (the EdemaWear can shrink length wise with washing).     Compression: Edemawear + compression per Lymphedema therapy    Walk as much as you can, as you are able. When you sit raise your ankle above your hips to promote wound healing.     Keep blood sugars below 150 and A1C below 7        M. Jose F Stanford M.D. March 16, 2023      Call us at [phone] if you have any questions about your wounds, have redness or swelling around your wound, have a fever of 101 or greater or if you have any  other problems or concerns. We answer the phone Monday through Friday 8 am to 4 pm, please leave a message as we check the voicemail frequently throughout the day.     If you had a positive experience please indicate that on your patient satisfaction survey form that [facility] will be sending you.    It was a pleasure meeting with you today.  Thank you for allowing me and my team the privilege of caring for you today.  YOU are the reason we are here, and I truly hope we provided you with the excellent service you deserve.  Please let us know if there is anything else we can do for you so that we can be sure you are leaving completely satisfied with your care experience.      If you have any billing related questions please call the [facility] Business office at [phone]. The clinic staff does not handle billing related matters.    If you are scheduled to have a follow up appointment, you will receive a reminder call the day before your visit. On the appointment day please arrive 15 minutes prior to your appointment time. If you are unable to keep that appointment, please call the clinic to cancel or reschedule. If you are more than 10 minutes late or greater for your appointment, the clinic policy is that you may be asked to reschedule.

## 2023-03-17 ENCOUNTER — HOSPITAL ENCOUNTER (OUTPATIENT)
Dept: OCCUPATIONAL THERAPY | Facility: CLINIC | Age: 68
Discharge: HOME | End: 2023-03-17
Payer: COMMERCIAL

## 2023-03-17 ENCOUNTER — PRE VISIT (OUTPATIENT)
Dept: TRANSPLANT | Facility: CLINIC | Age: 68
End: 2023-03-17

## 2023-03-17 ENCOUNTER — ONCOLOGY VISIT (OUTPATIENT)
Dept: TRANSPLANT | Facility: CLINIC | Age: 68
End: 2023-03-17
Attending: FAMILY MEDICINE
Payer: COMMERCIAL

## 2023-03-17 VITALS
BODY MASS INDEX: 40.8 KG/M2 | OXYGEN SATURATION: 98 % | SYSTOLIC BLOOD PRESSURE: 175 MMHG | TEMPERATURE: 98 F | WEIGHT: 252.8 LBS | HEART RATE: 73 BPM | DIASTOLIC BLOOD PRESSURE: 109 MMHG | RESPIRATION RATE: 18 BRPM

## 2023-03-17 DIAGNOSIS — Z79.4 TYPE 2 DIABETES MELLITUS WITH DIABETIC POLYNEUROPATHY, WITH LONG-TERM CURRENT USE OF INSULIN (H): ICD-10-CM

## 2023-03-17 DIAGNOSIS — E85.81 AL AMYLOIDOSIS (H): Primary | ICD-10-CM

## 2023-03-17 DIAGNOSIS — I10 PRIMARY HYPERTENSION: ICD-10-CM

## 2023-03-17 DIAGNOSIS — E11.42 TYPE 2 DIABETES MELLITUS WITH DIABETIC POLYNEUROPATHY, WITH LONG-TERM CURRENT USE OF INSULIN (H): ICD-10-CM

## 2023-03-17 DIAGNOSIS — E66.01 CLASS 3 SEVERE OBESITY DUE TO EXCESS CALORIES WITH SERIOUS COMORBIDITY AND BODY MASS INDEX (BMI) OF 40.0 TO 44.9 IN ADULT (H): ICD-10-CM

## 2023-03-17 DIAGNOSIS — I21.4 NSTEMI (NON-ST ELEVATED MYOCARDIAL INFARCTION) (H): ICD-10-CM

## 2023-03-17 DIAGNOSIS — I89.0 LYMPHEDEMA: Primary | ICD-10-CM

## 2023-03-17 DIAGNOSIS — K74.69 OTHER CIRRHOSIS OF LIVER (H): ICD-10-CM

## 2023-03-17 DIAGNOSIS — E66.813 CLASS 3 SEVERE OBESITY DUE TO EXCESS CALORIES WITH SERIOUS COMORBIDITY AND BODY MASS INDEX (BMI) OF 40.0 TO 44.9 IN ADULT (H): ICD-10-CM

## 2023-03-17 DIAGNOSIS — Z94.84 HISTORY OF PERIPHERAL STEM CELL TRANSPLANT (H): ICD-10-CM

## 2023-03-17 LAB
ALBUMIN SERPL BCG-MCNC: 3.9 G/DL (ref 3.5–5.2)
ALP SERPL-CCNC: 130 U/L (ref 40–129)
ALT SERPL W P-5'-P-CCNC: 28 U/L (ref 10–50)
ANION GAP SERPL CALCULATED.3IONS-SCNC: 10 MMOL/L (ref 7–15)
AST SERPL W P-5'-P-CCNC: 38 U/L (ref 10–50)
BASOPHILS # BLD AUTO: 0.1 10E3/UL (ref 0–0.2)
BASOPHILS NFR BLD AUTO: 1 %
BILIRUB SERPL-MCNC: 0.6 MG/DL
BUN SERPL-MCNC: 16.6 MG/DL (ref 8–23)
CALCIUM SERPL-MCNC: 9.3 MG/DL (ref 8.8–10.2)
CHLORIDE SERPL-SCNC: 100 MMOL/L (ref 98–107)
CREAT SERPL-MCNC: 0.85 MG/DL (ref 0.67–1.17)
DEPRECATED HCO3 PLAS-SCNC: 25 MMOL/L (ref 22–29)
EOSINOPHIL # BLD AUTO: 0.3 10E3/UL (ref 0–0.7)
EOSINOPHIL NFR BLD AUTO: 3 %
ERYTHROCYTE [DISTWIDTH] IN BLOOD BY AUTOMATED COUNT: 13.5 % (ref 10–15)
GFR SERPL CREATININE-BSD FRML MDRD: >90 ML/MIN/1.73M2
GLUCOSE SERPL-MCNC: 149 MG/DL (ref 70–99)
HCT VFR BLD AUTO: 38.3 % (ref 40–53)
HGB BLD-MCNC: 13.3 G/DL (ref 13.3–17.7)
IMM GRANULOCYTES # BLD: 0 10E3/UL
IMM GRANULOCYTES NFR BLD: 0 %
LYMPHOCYTES # BLD AUTO: 0.9 10E3/UL (ref 0.8–5.3)
LYMPHOCYTES NFR BLD AUTO: 10 %
MCH RBC QN AUTO: 32.2 PG (ref 26.5–33)
MCHC RBC AUTO-ENTMCNC: 34.7 G/DL (ref 31.5–36.5)
MCV RBC AUTO: 93 FL (ref 78–100)
MONOCYTES # BLD AUTO: 0.5 10E3/UL (ref 0–1.3)
MONOCYTES NFR BLD AUTO: 5 %
NEUTROPHILS # BLD AUTO: 7.3 10E3/UL (ref 1.6–8.3)
NEUTROPHILS NFR BLD AUTO: 81 %
NRBC # BLD AUTO: 0 10E3/UL
NRBC BLD AUTO-RTO: 0 /100
NT-PROBNP SERPL-MCNC: 530 PG/ML (ref 0–900)
PLATELET # BLD AUTO: 205 10E3/UL (ref 150–450)
POTASSIUM SERPL-SCNC: 4.6 MMOL/L (ref 3.4–5.3)
PROT SERPL-MCNC: 6.6 G/DL (ref 6.4–8.3)
RBC # BLD AUTO: 4.13 10E6/UL (ref 4.4–5.9)
SODIUM SERPL-SCNC: 135 MMOL/L (ref 136–145)
TOTAL PROTEIN SERUM FOR ELP: 6.2 G/DL (ref 6.4–8.3)
WBC # BLD AUTO: 9 10E3/UL (ref 4–11)

## 2023-03-17 PROCEDURE — 84155 ASSAY OF PROTEIN SERUM: CPT | Performed by: STUDENT IN AN ORGANIZED HEALTH CARE EDUCATION/TRAINING PROGRAM

## 2023-03-17 PROCEDURE — 84165 PROTEIN E-PHORESIS SERUM: CPT | Mod: TC | Performed by: PATHOLOGY

## 2023-03-17 PROCEDURE — 80053 COMPREHEN METABOLIC PANEL: CPT | Performed by: STUDENT IN AN ORGANIZED HEALTH CARE EDUCATION/TRAINING PROGRAM

## 2023-03-17 PROCEDURE — 97165 OT EVAL LOW COMPLEX 30 MIN: CPT | Mod: GO | Performed by: OCCUPATIONAL THERAPIST

## 2023-03-17 PROCEDURE — 36415 COLL VENOUS BLD VENIPUNCTURE: CPT | Performed by: STUDENT IN AN ORGANIZED HEALTH CARE EDUCATION/TRAINING PROGRAM

## 2023-03-17 PROCEDURE — 85025 COMPLETE CBC W/AUTO DIFF WBC: CPT | Performed by: STUDENT IN AN ORGANIZED HEALTH CARE EDUCATION/TRAINING PROGRAM

## 2023-03-17 PROCEDURE — 97140 MANUAL THERAPY 1/> REGIONS: CPT | Mod: GO | Performed by: OCCUPATIONAL THERAPIST

## 2023-03-17 PROCEDURE — 99215 OFFICE O/P EST HI 40 MIN: CPT | Performed by: STUDENT IN AN ORGANIZED HEALTH CARE EDUCATION/TRAINING PROGRAM

## 2023-03-17 PROCEDURE — 84165 PROTEIN E-PHORESIS SERUM: CPT | Mod: 26

## 2023-03-17 PROCEDURE — 82784 ASSAY IGA/IGD/IGG/IGM EACH: CPT | Performed by: STUDENT IN AN ORGANIZED HEALTH CARE EDUCATION/TRAINING PROGRAM

## 2023-03-17 PROCEDURE — 86334 IMMUNOFIX E-PHORESIS SERUM: CPT | Performed by: PATHOLOGY

## 2023-03-17 PROCEDURE — G0463 HOSPITAL OUTPT CLINIC VISIT: HCPCS | Performed by: STUDENT IN AN ORGANIZED HEALTH CARE EDUCATION/TRAINING PROGRAM

## 2023-03-17 PROCEDURE — 86334 IMMUNOFIX E-PHORESIS SERUM: CPT | Mod: 26

## 2023-03-17 PROCEDURE — 83880 ASSAY OF NATRIURETIC PEPTIDE: CPT | Performed by: STUDENT IN AN ORGANIZED HEALTH CARE EDUCATION/TRAINING PROGRAM

## 2023-03-17 PROCEDURE — 83521 IG LIGHT CHAINS FREE EACH: CPT | Performed by: STUDENT IN AN ORGANIZED HEALTH CARE EDUCATION/TRAINING PROGRAM

## 2023-03-17 NOTE — NURSING NOTE
Patient labs drawn in clinic via venipuncture. Patient tolerated well and was discharged. Specimen(s) sent to first floor lab for processing. See flowsheet for details.      Selam Lara CMA on 3/17/2023 at 11:54 AM

## 2023-03-17 NOTE — PROGRESS NOTES
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      Disease presentation and baseline characteristics:   Per [facility] Records:  \"1. May 2015, patient developed increasing right upper quadrant pain. Endoscopy performed in 2015 was reportedly unremarkable. An enlarged lymph node was seen, but biopsy was negative in 2015. He subsequently developed increasing pain in later in 2015 which required multiple visits to an Emergency Room for further management.  2. September 2015, patient was seen in [facility] ED where he received an ultrasound of the abdomen that demonstrated surgically absent gallbladder with a slightly enlarged pancreas. There were mildly enlarged peripancreatic lymph nodes measuring 3.4 x 1.3 cm in size which were thought to be indeterminate.   3. April 29, 2016, patient underwent an endoscopic EUS that demonstrated modest enlargement of lymph nodes. Reportedly the biopsy was consistent with extracellular material; however, positive for amyloid stains. No monoclonal lymphocytes were seen in other specimens. Testing at [facility] reportedly demonstrated AL kappa amyloidosis subtype in the lymph nodes.  4. June 6, 2016, skeletal survey demonstrated a 1.4 cm focal lytic lesion in the right radial diaphysis and possible diffuse tiny subcentimeter lytic lesions throughout the calvarium. Blood work that time demonstrated a reduced IgG (276), IgM (12), and IgA (44). No monoclonal protein or free light chain was seen in the serum or urine.  5. June 8, 2016, bone marrow biopsy demonstrated normocellular bone marrow with 50% cellularity. Plasma cells were 5% with 4% kappa monotypic features on flow cytometry. The vessel walls were noted to be thick with positive Congo staining consistent with primary amyloidosis. Chromosomes demonstrated a normal 46 XY in 20 (out of 20) metaphases. FISH however demonstrated t(11;14) along with a monosomy 13 consistent with a plasma cell " "neoplasm.   6. June 23, 2016: He presented to [facility] for a second opinion on the suspected AL amyloidosis. The PET/CT from June 27, 2016 was negative for lytic lesions. Congo red stain was performed on the biopsies from the subcarinal lymph nodes, which were positive for amyloid deposition. Mass spectrometry revealed the specimen to be positive for AL (kappa) type amyloid. There was no definitive evidence for cardiac involvement.\"    Date Treatment Name Response Side Effects / Toxicities   8/1/2016 CyBorD VGPR    12/27/2016 HDT/ASCT CR           HPI:  Please see my entry above for hematologic history.  Mr. Hernández presents today to establish care.  He notes he was last seen by his hematologist at [place] in 2/2022; he has not seen an oncologist since that time.  He was recently admitted 3/10-3/15 for RLE cellulitis and suffered a mechanical fall during his admission; he also was noted to have poorly controlled DM2.  He has an endocrinologist that he intends to follow up with this month.  Mr. Hernández notes he has a chronic neuropathy in his bilateral hands/feet but does not think this has changed in the past year.  He also has chronic fatigue but has not noted any new progressive shortness of breath in the past year.  He       ASSESSMENT AND PLAN:  We reviewed the management and monitoring of AL amyloidosis in detail today.  Mr. Hernández has a number of health co-morbidities that complicate clinical monitoring of his disease status, but he does not appear to have overt clinical relapse on exam today.  He has not had monoclonal protein studies checked in a year and we will repeat those today.  We discussed the management of relapsed AL amyloidosis if/when it becomes necessary.  He does have 5.19 x 10^6 CD45 cells/kg in storage at [place].    Blood pressure is significantly elevated in clinic today but Mr. Hernández notes he forgot to take his morning medications.  He will take them after our visit and will continue to follow with " "his cardiologist.  He does have a history of NSTEMI and is working on diet/exercise.    Continue to follow with endocrinology for DM2; last A1c was 9.1.  Mr. Hernández is aware that he will need improved glycemic control going forward.    Mr. Hernández reports his cellulitis is \"95% gone\" but declined to let me examine the area today as he did not want to remove his clothes.      Plan:   - Monoclonal protein studies and NT proBNP drawn today  - Will schedule repeat echocardiogram    Follow up in 6 months with me unless above studies are concerning for disease relapse    I spent 60 minutes in the care of this patient today, which included time necessary for preparation for the visit, obtaining history, ordering medications/tests/procedures as medically indicated, review of pertinent medical literature, counseling of the patient, communication of recommendations to the care team, and documentation time.    Charly Hester MD    ROS:    10 point ROS neg other than the symptoms noted above in the HPI.      Past Medical History:   Diagnosis Date     Acquired lymphedema 2/4/2019     Allergic state      Amyloidosis (H)     followed by hematology Dr. Romeo status post peripheral stem cell transplant     Anxiety 5/11/2016     Anxiety and depression 12/18/2013     Bipolar I disorder (H) 9/1/2015    Under care of psychiatrist [facility]- Dr Rahman doxepin 25 mg, 2 cap bedtime DULoxetine 20 mg once daily  OLANZapine 7.5 mg  1 tab bedtime      DDD (degenerative disc disease), lumbar 8/6/2020     Depressive disorder 1995     EKG abnormality 4/20/2020     H/O bone marrow transplant (H)      Headache(784.0)      History of diverticulitis 1/27/2015    1/15-rxed with antibiotics      Hyperlipidemia LDL goal <100 10/31/2010     Hypertension 2007     Hypertension goal BP (blood pressure) < 140/90 10/11/2011     Marianne (H) 8/20/2015     Morbid obesity (H) 8/28/2018     Myalgia and myositis, unspecified      Narcotic dependence (H) 9/6/2016    None " in about 6 months- 8/1/17     NSTEMI (non-ST elevated myocardial infarction) (H) 04/19/2020     OCD (obsessive compulsive disorder)      Other acquired absence of organ 94     Other cirrhosis of liver (H) possibly from vences  4/15/2020     Other specified viral warts      Peripheral edema 5/20/2018    Noncardiac Continue with  furosemide (LASIX) 20 MG tablet,  potassium       chloride SA (K-DUR/KLOR-CON M) 10 MEQ CR  Tab once daily  Basic metabolic panel     Polyneuropathy associated with underlying disease (H) 2/4/2019     Restless legs syndrome (RLS) 6/29/2017     Stasis dermatitis of both legs 12/31/2018     Type 2 diabetes mellitus with other specified complication (H) 7/10/2017       Past Surgical History:   Procedure Laterality Date     ABDOMEN SURGERY  2007    abdominal hernia     BACK SURGERY  8/6/2020     BIOPSY  june 2015    negative     BMT PROTOCOL      for systemic amyloidosis     BONE MARROW BIOPSY, BONE SPECIMEN, NEEDLE/TROCAR N/A 6/8/2016    Procedure: BIOPSY BONE MARROW;  Surgeon: Nathan Agrawal MD;  Location:  GI     BONE MARROW BIOPSY, BONE SPECIMEN, NEEDLE/TROCAR N/A 2/20/2019    Procedure: BIOPSY BONE MARROW;  Surgeon: Michael Raygoza MD;  Location:  GI     CHOLECYSTECTOMY  1995    lap qian     COLONOSCOPY  2012    hx polyps     ESOPHAGOSCOPY, GASTROSCOPY, DUODENOSCOPY (EGD), COMBINED N/A 5/29/2015    Procedure: COMBINED ESOPHAGOSCOPY, GASTROSCOPY, DUODENOSCOPY (EGD), BIOPSY SINGLE OR MULTIPLE;  Surgeon: John Jacob MD;  Location:  GI     HERNIA REPAIR, UMBILICAL  2006     Z NONSPECIFIC PROCEDURE  94    Cholecystectomy     [facility] NONSPECIFIC PROCEDURE  2000    repair deviated septum       Family History   Problem Relation Age of Onset     Cerebrovascular Disease Mother      C.A.D. Mother      Hypertension Mother      Alcohol/Drug Mother      Arthritis Mother      Cerebrovascular Disease Maternal Grandmother      C.A.D. Maternal Grandmother      Alcohol/Drug  Maternal Grandmother      C.A.D. Father      Heart Disease Father      Hypertension Father      Alcohol/Drug Father      Allergies Father      Circulatory Father      Depression Father      Respiratory Father      Asthma Father      Alcohol/Drug Paternal Grandfather      Cerebrovascular Disease Paternal Grandfather      Depression Son      Psychotic Disorder Son         anxiety disorder     Depression Daughter      Cerebrovascular Disease Maternal Grandfather      Cerebrovascular Disease Paternal Grandmother      Diabetes Brother      Allergies Sister      Depression Sister      Gynecology Sister        Social History     Tobacco Use     Smoking status: Never     Smokeless tobacco: Never   Vaping Use     Vaping Use: Never used   Substance Use Topics     Alcohol use: Not Currently     Alcohol/week: 0.0 standard drinks     Drug use: Not Currently     Types: Cocaine, Marijuana, Methamphetamines, Opiates, IV, Amphetamines, Barbiturates, Benzodiazepines, Codeine, Fentanyl, Hashish, Hydrocodone, Hydromorphone, LSD, Oxycodone         Allergies   Allergen Reactions     Cephalexin Diarrhea     Liraglutide Other (See Comments), Headache and Unknown     Other reaction(s): Headache, Unknown  PN: migraines - Increased migraine frequency and severity       Sulfa Drugs Angioedema and Swelling     Pt has taken  Taken sulfa drugs orally without trouble. He had problems with Sulfa eye drops. Eye swelled up       Victoza      Increased migraine frequency and severity        Current Outpatient Medications   Medication Sig Dispense Refill     acetaminophen (TYLENOL) 500 MG tablet Take 1,000 mg by mouth daily as needed for mild pain Takes overnight       acetaminophen (TYLENOL) 500 MG tablet Take 500 mg by mouth 2 times daily       ammonium lactate (AMLACTIN) 12 % external cream Apply topically 2 times daily Apply to both feet and legs 2 times daily 385 g 1     aspirin (ASPIRIN LOW DOSE) 81 MG tablet Take 1 tablet (81 mg) by mouth daily  30 tablet 3     atorvastatin (LIPITOR) 20 MG tablet Take 1.5 tablets (30 mg) by mouth daily 135 tablet 3     Bioflavonoid Products (CANDIDO-C) TABS Take 1,000 mg by mouth daily 90 tablet 3     blood glucose (NO BRAND SPECIFIED) lancets standard Use to test blood sugar 1 time daily or as directed. 100 lancet 4     blood glucose (NO BRAND SPECIFIED) test strip Use to test blood sugar 1 time daily or as directed. 200 strip 4     buPROPion (WELLBUTRIN XL) 150 MG 24 hr tablet Take 150 mg by mouth every morning       cariprazine (VRAYLAR) 4.5 MG capsule Take 4.5 mg by mouth daily       cephALEXin (KEFLEX) 500 MG capsule Take 1 capsule (500 mg) by mouth every 8 hours for 6 days 18 capsule 0     clopidogrel (PLAVIX) 75 MG tablet Take 75 mg by mouth daily       dulaglutide (TRULICITY) 0.75 MG/0.5ML pen Inject 0.75 mg Subcutaneous every 7 days 2 mL 3     DULoxetine (CYMBALTA) 30 MG capsule Take 30 mg by mouth 2 times daily       Folic Acid-Vit B6-Vit B12 0.5-5-0.2 MG TABS Take 1 tablet by mouth daily 90 tablet 3     glucose 40 % (400 mg/mL) gel Take 15-30 g by mouth every 15 minutes as needed for low blood sugar 15 g 0     hydrochlorothiazide (HYDRODIURIL) 25 MG tablet Take 1.5 tablets (37.5 mg) by mouth daily 135 tablet 0     insulin glargine (LANTUS PEN) 100 UNIT/ML pen Inject 10 Units Subcutaneous At Bedtime 15 mL 0     loperamide (IMODIUM) 2 MG capsule Take 1 capsule (2 mg) by mouth 4 times daily as needed for diarrhea       LORazepam (ATIVAN) 1 MG tablet Take 1 tablet (1 mg) by mouth once as needed for anxiety (Prior to MRI) 1 tablet 0     losartan (COZAAR) 50 MG tablet Take 50 mg by mouth daily       medication given by implanted intrathecal pump continuous Drug # 1: Fentanyl (Sublimaze) - Conc: 2000 mcg/mL - Total Dose / 24 hours: 1561.9 mcg  Drug # 2: Bupivacaine (Marcaine)  - Conc: 20 mg/mL - Total Dose / 24 hours: 15.619 mg  Drug # 3: Morphine (Duramorph or Infumorph)  - Conc: 9 mg/mL - Total Dose / 24 hours: 7.0284  mg    Rate: 0.0325 mL/hr  Pump Reservoir Volume: 40 mL  Outside Clinic & Provider: [facility]  Last Refill Date: 2/22/2023  Next Refill Date: 4/6/2023  Low Bandera Alarm Date: 4/4/2023  Pump : Medtronic SynchroMed II    Boluses: Up to 3 per day, 3 minute duration. Lock-out every 6 hours  Fentanyl: 99.9 mcg/dose  Bupivacaine: 0.999 mg/dose  Morphine: 0.4497 mg/dose       metFORMIN (GLUCOPHAGE XR) 500 MG 24 hr tablet Take 2 tablets (1,000 mg) by mouth 2 times daily (with meals) 360 tablet 1     metoprolol succinate ER (TOPROL-XL) 25 MG 24 hr tablet Take 2 tablets (50 mg) by mouth daily 180 tablet 0     modafinil (PROVIGIL) 200 MG tablet Take 1.5 tablets (300 mg) by mouth daily 45 tablet 0     multivitamin w/minerals (THERA-VIT-M) tablet Take 1 tablet by mouth daily Men's 50+       nitroGLYcerin (NITROSTAT) 0.4 MG sublingual tablet Place 0.4 mg under the tongue every 5 minutes as needed for chest pain For chest pain place 1 tablet under the tongue every 5 minutes for 3 doses. If symptoms persist 5 minutes after 1st dose call 911.       oxyCODONE-acetaminophen (PERCOCET) 5-325 MG tablet Take 2 tablets by mouth 2 times daily       pregabalin (LYRICA) 100 MG capsule Take 100 mg by mouth Take up to 4 times per day       saccharomyces boulardii (FLORASTOR) 250 MG capsule Take 1 capsule (250 mg) by mouth 2 times daily 14 capsule 0     senna-docusate (SENOKOT-S/PERICOLACE) 8.6-50 MG tablet Take 1-2 tablets by mouth 2 times daily At baseline, Take 1 tablet in the morning and 2 tablets in the evening. May take second tablet in morning if needed.       SUMAtriptan (IMITREX) 50 MG tablet Take 1 tablet (50 mg) by mouth at onset of headache for migraine May repeat in 2 hours. Max 4 tablets/24 hours. 8 tablet 1     tamsulosin (FLOMAX) 0.4 MG capsule Take 1 capsule (0.4 mg) by mouth 2 times daily 30 capsule 3     testosterone (ANDROGEL/TESTIM) 50 MG/5GM (1%) topical gel Place 50 mg of  testosterone onto the skin daily       vitamin D3 (CHOLECALCIFEROL) 1.25 MG (59126 UT) capsule Take 1 capsule (50,000 Units) by mouth every 7 days 90 capsule 3         Physical Exam:     Vital Signs: BP (!) 175/109   Pulse 73   Temp 98  F (36.7  C) (Oral)   Resp 18   Wt 114.7 kg (252 lb 12.8 oz)   SpO2 98%   BMI 40.80 kg/m      ECO  General Appearance: alert, and no distress  Eyes: PERRL, conjunctiva and lids normal, sclera nonicteric  Ears/Nose/M/Throat: Oral mucosa and posterior oropharynx normal, moist mucous membranes  Neck supple, non-tender, free range of motion, no adenopathy  Cardio/Vascular:regular rate and rhythm, normal S1 and S2, no murmur  Resp Effort And Auscultation: Normal - Clear to auscultation without rales, rhonchi, or wheezing.  GI: soft, nontender, bowel sounds present in all four quadrants, no hepatosplenomegaly  Lymphatics:no significant enlargement of lymph nodes globally   Musculoskeletal: Musculoskeletal normal  Edema: none  Skin: Skin color, texture, turgor normal. No rashes or lesions in visualized areas; patient declined to remove clothes for examination of recent RLE cellulitis  Neurologic: Gait normal.  Sensation grossly WNL.  Psych/Affect: Mood and affect are appropriate.    Blood Counts     Recent Labs   Lab Test 23  1153 23  0701 03/10/23  1238 23  0832   HGB 13.3 12.8* 13.6 14.7   HCT 38.3* 36.9* 38.2* 40.5   WBC 9.0 8.8 9.8 10.3   ANEUTAUTO 7.3  --  7.7 8.4*   ALYMPAUTO 0.9  --  1.1 1.0   AMONOAUTO 0.5  --  0.6 0.6   AEOSAUTO 0.3  --  0.3 0.3   ABSBASO 0.1  --  0.1 0.1   NRBCMAN 0.0  --  0.0  --     227 250 249       Chemistries     Basic Panel  Recent Labs   Lab Test 23  1153 03/15/23  1226 03/15/23  0907 23  1321 23  1102 23  1227 23  1053 23  0805 23  0616   *  --   --   --  138  --   --   --  134*   POTASSIUM 4.6  --   --   --  5.0  --  4.1   < > 3.3*   CHLORIDE 100  --   --   --  100  --   --    --  95*   CO2 25  --   --   --  28  --   --   --  32*   BUN 16.6  --   --   --  9.0  --   --   --  18.1   CR 0.85  --   --   --  0.71  --   --   --  0.99   * 172* 140*   < > 195*   < >  --    < > 251*    < > = values in this interval not displayed.        Calcium, Magnesium, Phosphorus  Recent Labs   Lab Test 03/17/23  1153 03/14/23  1102 03/12/23  0616 03/11/23  0701 12/03/20  0930 12/03/20  0622 07/27/20  1432 06/29/20  1215 05/04/20  1135 04/22/20  0636   LUIS 9.3 8.8 8.7* 8.6*   < > 8.3*   < > 8.5   < > 8.6   MAG  --   --   --  2.1  --  2.3  --  2.2  --  1.8   PHOS  --   --   --   --   --   --   --   --   --  3.3    < > = values in this interval not displayed.        LFTs  Recent Labs   Lab Test 03/17/23  1153 03/10/23  1238 02/23/23  1227   BILITOTAL 0.6 0.4 0.5   ALKPHOS 130* 138* 115   AST 38 24 29   ALT 28 29 51*   ALBUMIN 3.9 3.7 4.2     Parmjit understood the above assessment and recommendations.  Multiple questions answered.  No barriers to learning identified.      Known issues that I take into account for medical decisions, with salient changes to the plan considering these complexities noted above.    Patient Active Problem List   Diagnosis     Low back pain     Hyperlipidemia     Hypertension goal BP (blood pressure) < 140/90     Type 2 diabetes mellitus (H)     Advanced directives, counseling/discussion     Migraine headache     History of diverticulitis     MENTAL HEALTH     Bipolar II disorder (H)     Generalized abdominal pain     Light chain (AL) amyloidosis (H)     Insomnia due to medical condition     Obstructive sleep apnea syndrome     Restless legs syndrome (RLS)     Leg edema     Morbid obesity (H)     Venous stasis dermatitis of both lower extremities     Polyneuropathy, unspecified     Acquired lymphedema     Low blood pressure reading     History of peripheral stem cell transplant (H)     Other specified anxiety disorders     Abnormal electrocardiography     Bilateral leg edema      Onychomycosis     Thrombocytopenia, unspecified (H)     Muscle weakness (generalized)     Bilateral leg weakness     NSTEMI (non-ST elevated myocardial infarction) (H)     Hyperkalemia     Essential hypertension     Shortness of breath     Chest pain     Peripheral vascular disease (H)     Other cirrhosis of liver (H)     Lymphedema     Cellulitis of right lower extremity     Right upper quadrant pain     Abnormal computed tomography scan     Acquired pancytopenia (H)     Acute postoperative pain     Benign neoplasm of cecum     Benign neoplasm of transverse colon     Benign prostatic hyperplasia     Bilateral cataracts     Bipolar 1 disorder, manic, mild (H)     Cannabis dependence in remission (H)     Cardiovascular stress test abnormal     Chronic fatigue, unspecified     Continuous opioid dependence (H)     Contusion of rib     Coronary arteriosclerosis     DDD (degenerative disc disease), lumbar     Decreased testosterone level     Depressed bipolar I disorder in full remission (H)     Chronic diarrhea     Hernia of anterior abdominal wall     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)     Disorder of refraction     Diverticular disease of colon     Drug induced constipation     Elevated troponin     Guillain-Springfield syndrome (H)     History of anaphylaxis due to severe acute respiratory syndrome coronavirus 2 (SARS-CoV-2) vaccine     History of colonic polyps     Hypertensive renal disease     Hypocalcemia     Immunoglobulin deficiency (H)     Localized enlarged lymph nodes     Nonalcoholic steatohepatitis     Noninfectious gastroenteritis     Other muscle spasm     Polyp of colon     Presence of implanted infusion pump     Retention of urine     Spinal stenosis at L4-L5 level     Stasis ulcer (H)     Chronic pain     Cerebral infarction, unspecified (H)     Venous stasis ulcer of other part of left lower leg limited to breakdown of skin without varicose veins (H)     Chronic ulcer of  right leg, limited to breakdown of skin (H)       ------------------------------------------------------------------------------------------------------------------------------------------------    Patient Care Team       Relationship Specialty Notifications Start End    Sherwin Mejia DO PCP - General Family Medicine  3/1/23     Phone: [phone] Fax: [phone] [address] [address]    Kuldeep Borrero MD MD Gastroenterology  1/11/16     Phone: [phone] Fax: [phone]         [address] [place], [place] [address]B [address] [place] [address]    Vince Henry MD Assigned PCP   2/3/19     Phone: [phone] Fax: [phone]         [facility] [place] [address] [place] [address]    Dylan Jose MD MD Neurology  3/15/23     Phone: [phone] Fax: [phone]         [address] [place] [address]    Kera Jernigan APRN CNP Nurse Practitioner Cardiovascular Disease  3/16/23     Phone: [phone] Fax: [phone] [address] [facility] [address]

## 2023-03-17 NOTE — LETTER
"    3/17/2023         RE: Parmjit Hernández  [address]  [facility] [address]        Dear Colleague,    Thank you for referring your patient, Parmjit Hernández, to the [facility] [place]. Please see a copy of my visit note below.         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      Disease presentation and baseline characteristics:   Per [facility] Records:  \"1. May 2015, patient developed increasing right upper quadrant pain. Endoscopy performed in 2015 was reportedly unremarkable. An enlarged lymph node was seen, but biopsy was negative in 2015. He subsequently developed increasing pain in later in 2015 which required multiple visits to an Emergency Room for further management.  2. September 2015, patient was seen in [facility] ED where he received an ultrasound of the abdomen that demonstrated surgically absent gallbladder with a slightly enlarged pancreas. There were mildly enlarged peripancreatic lymph nodes measuring 3.4 x 1.3 cm in size which were thought to be indeterminate.   3. April 29, 2016, patient underwent an endoscopic EUS that demonstrated modest enlargement of lymph nodes. Reportedly the biopsy was consistent with extracellular material; however, positive for amyloid stains. No monoclonal lymphocytes were seen in other specimens. Testing at [facility] reportedly demonstrated AL kappa amyloidosis subtype in the lymph nodes.  4. June 6, 2016, skeletal survey demonstrated a 1.4 cm focal lytic lesion in the right radial diaphysis and possible diffuse tiny subcentimeter lytic lesions throughout the calvarium. Blood work that time demonstrated a reduced IgG (276), IgM (12), and IgA (44). No monoclonal protein or free light chain was seen in the serum or urine.  5. June 8, 2016, bone marrow biopsy demonstrated normocellular bone marrow with 50% cellularity. Plasma cells were 5% with 4% kappa monotypic " "features on flow cytometry. The vessel walls were noted to be thick with positive Congo staining consistent with primary amyloidosis. Chromosomes demonstrated a normal 46 XY in 20 (out of 20) metaphases. FISH however demonstrated t(11;14) along with a monosomy 13 consistent with a plasma cell neoplasm.   6. June 23, 2016: He presented to [facility] for a second opinion on the suspected AL amyloidosis. The PET/CT from June 27, 2016 was negative for lytic lesions. Congo red stain was performed on the biopsies from the subcarinal lymph nodes, which were positive for amyloid deposition. Mass spectrometry revealed the specimen to be positive for AL (kappa) type amyloid. There was no definitive evidence for cardiac involvement.\"    Date Treatment Name Response Side Effects / Toxicities   8/1/2016 CyBorD VGPR    12/27/2016 HDT/ASCT CR           HPI:  Please see my entry above for hematologic history.  Mr. Hernández presents today to establish care.  He notes he was last seen by his hematologist at [place] in 2/2022; he has not seen an oncologist since that time.  He was recently admitted 3/10-3/15 for RLE cellulitis and suffered a mechanical fall during his admission; he also was noted to have poorly controlled DM2.  He has an endocrinologist that he intends to follow up with this month.  Mr. Hernández notes he has a chronic neuropathy in his bilateral hands/feet but does not think this has changed in the past year.  He also has chronic fatigue but has not noted any new progressive shortness of breath in the past year.  He       ASSESSMENT AND PLAN:  We reviewed the management and monitoring of AL amyloidosis in detail today.  Mr. Hernández has a number of health co-morbidities that complicate clinical monitoring of his disease status, but he does not appear to have overt clinical relapse on exam today.  He has not had monoclonal protein studies checked in a year and we will repeat those today.  We discussed the management of relapsed " "AL amyloidosis if/when it becomes necessary.  He does have 5.19 x 10^6 CD45 cells/kg in storage at [place].    Blood pressure is significantly elevated in clinic today but Mr. Hernández notes he forgot to take his morning medications.  He will take them after our visit and will continue to follow with his cardiologist.  He does have a history of NSTEMI and is working on diet/exercise.    Continue to follow with endocrinology for DM2; last A1c was 9.1.  Mr. Hernández is aware that he will need improved glycemic control going forward.    Mr. Hernández reports his cellulitis is \"95% gone\" but declined to let me examine the area today as he did not want to remove his clothes.      Plan:   - Monoclonal protein studies and NT proBNP drawn today  - Will schedule repeat echocardiogram    Follow up in 6 months with me unless above studies are concerning for disease relapse    I spent 60 minutes in the care of this patient today, which included time necessary for preparation for the visit, obtaining history, ordering medications/tests/procedures as medically indicated, review of pertinent medical literature, counseling of the patient, communication of recommendations to the care team, and documentation time.    Charly Hester MD    ROS:    10 point ROS neg other than the symptoms noted above in the HPI.      Past Medical History:   Diagnosis Date     Acquired lymphedema 2/4/2019     Allergic state      Amyloidosis (H)     followed by hematology Dr. Romeo status post peripheral stem cell transplant     Anxiety 5/11/2016     Anxiety and depression 12/18/2013     Bipolar I disorder (H) 9/1/2015    Under care of psychiatrist [facility]- Dr Rahman doxepin 25 mg, 2 cap bedtime DULoxetine 20 mg once daily  OLANZapine 7.5 mg  1 tab bedtime      DDD (degenerative disc disease), lumbar 8/6/2020     Depressive disorder 1995     EKG abnormality 4/20/2020     H/O bone marrow transplant (H)      Headache(784.0)      History of diverticulitis 1/27/2015    " 1/15-rxed with antibiotics      Hyperlipidemia LDL goal <100 10/31/2010     Hypertension 2007     Hypertension goal BP (blood pressure) < 140/90 10/11/2011     Marianne (H) 8/20/2015     Morbid obesity (H) 8/28/2018     Myalgia and myositis, unspecified      Narcotic dependence (H) 9/6/2016    None in about 6 months- 8/1/17     NSTEMI (non-ST elevated myocardial infarction) (H) 04/19/2020     OCD (obsessive compulsive disorder)      Other acquired absence of organ 94     Other cirrhosis of liver (H) possibly from vences  4/15/2020     Other specified viral warts      Peripheral edema 5/20/2018    Noncardiac Continue with  furosemide (LASIX) 20 MG tablet,  potassium       chloride SA (K-DUR/KLOR-CON M) 10 MEQ CR  Tab once daily  Basic metabolic panel     Polyneuropathy associated with underlying disease (H) 2/4/2019     Restless legs syndrome (RLS) 6/29/2017     Stasis dermatitis of both legs 12/31/2018     Type 2 diabetes mellitus with other specified complication (H) 7/10/2017       Past Surgical History:   Procedure Laterality Date     ABDOMEN SURGERY  2007    abdominal hernia     BACK SURGERY  8/6/2020     BIOPSY  june 2015    negative     BMT PROTOCOL      for systemic amyloidosis     BONE MARROW BIOPSY, BONE SPECIMEN, NEEDLE/TROCAR N/A 6/8/2016    Procedure: BIOPSY BONE MARROW;  Surgeon: Nathan Agrawal MD;  Location:  GI     BONE MARROW BIOPSY, BONE SPECIMEN, NEEDLE/TROCAR N/A 2/20/2019    Procedure: BIOPSY BONE MARROW;  Surgeon: Michael Raygoza MD;  Location:  GI     CHOLECYSTECTOMY  1995    lap qian     COLONOSCOPY  2012    hx polyps     ESOPHAGOSCOPY, GASTROSCOPY, DUODENOSCOPY (EGD), COMBINED N/A 5/29/2015    Procedure: COMBINED ESOPHAGOSCOPY, GASTROSCOPY, DUODENOSCOPY (EGD), BIOPSY SINGLE OR MULTIPLE;  Surgeon: John Jacob MD;  Location:  GI     HERNIA REPAIR, UMBILICAL  2006     [facility] NONSPECIFIC PROCEDURE  94    Cholecystectomy     [facility] NONSPECIFIC PROCEDURE  2000    repair  deviated septum       Family History   Problem Relation Age of Onset     Cerebrovascular Disease Mother      C.A.D. Mother      Hypertension Mother      Alcohol/Drug Mother      Arthritis Mother      Cerebrovascular Disease Maternal Grandmother      C.A.D. Maternal Grandmother      Alcohol/Drug Maternal Grandmother      C.A.D. Father      Heart Disease Father      Hypertension Father      Alcohol/Drug Father      Allergies Father      Circulatory Father      Depression Father      Respiratory Father      Asthma Father      Alcohol/Drug Paternal Grandfather      Cerebrovascular Disease Paternal Grandfather      Depression Son      Psychotic Disorder Son         anxiety disorder     Depression Daughter      Cerebrovascular Disease Maternal Grandfather      Cerebrovascular Disease Paternal Grandmother      Diabetes Brother      Allergies Sister      Depression Sister      Gynecology Sister        Social History     Tobacco Use     Smoking status: Never     Smokeless tobacco: Never   Vaping Use     Vaping Use: Never used   Substance Use Topics     Alcohol use: Not Currently     Alcohol/week: 0.0 standard drinks     Drug use: Not Currently     Types: Cocaine, Marijuana, Methamphetamines, Opiates, IV, Amphetamines, Barbiturates, Benzodiazepines, Codeine, Fentanyl, Hashish, Hydrocodone, Hydromorphone, LSD, Oxycodone         Allergies   Allergen Reactions     Cephalexin Diarrhea     Liraglutide Other (See Comments), Headache and Unknown     Other reaction(s): Headache, Unknown  PN: migraines - Increased migraine frequency and severity       Sulfa Drugs Angioedema and Swelling     Pt has taken  Taken sulfa drugs orally without trouble. He had problems with Sulfa eye drops. Eye swelled up       [place]      Increased migraine frequency and severity        Current Outpatient Medications   Medication Sig Dispense Refill     acetaminophen (TYLENOL) 500 MG tablet Take 1,000 mg by mouth daily as needed for mild pain Takes  overnight       acetaminophen (TYLENOL) 500 MG tablet Take 500 mg by mouth 2 times daily       ammonium lactate (AMLACTIN) 12 % external cream Apply topically 2 times daily Apply to both feet and legs 2 times daily 385 g 1     aspirin (ASPIRIN LOW DOSE) 81 MG tablet Take 1 tablet (81 mg) by mouth daily 30 tablet 3     atorvastatin (LIPITOR) 20 MG tablet Take 1.5 tablets (30 mg) by mouth daily 135 tablet 3     Bioflavonoid Products (CANDIDO-C) TABS Take 1,000 mg by mouth daily 90 tablet 3     blood glucose (NO BRAND SPECIFIED) lancets standard Use to test blood sugar 1 time daily or as directed. 100 lancet 4     blood glucose (NO BRAND SPECIFIED) test strip Use to test blood sugar 1 time daily or as directed. 200 strip 4     buPROPion (WELLBUTRIN XL) 150 MG 24 hr tablet Take 150 mg by mouth every morning       cariprazine (VRAYLAR) 4.5 MG capsule Take 4.5 mg by mouth daily       cephALEXin (KEFLEX) 500 MG capsule Take 1 capsule (500 mg) by mouth every 8 hours for 6 days 18 capsule 0     clopidogrel (PLAVIX) 75 MG tablet Take 75 mg by mouth daily       dulaglutide (TRULICITY) 0.75 MG/0.5ML pen Inject 0.75 mg Subcutaneous every 7 days 2 mL 3     DULoxetine (CYMBALTA) 30 MG capsule Take 30 mg by mouth 2 times daily       Folic Acid-Vit B6-Vit B12 0.5-5-0.2 MG TABS Take 1 tablet by mouth daily 90 tablet 3     glucose 40 % (400 mg/mL) gel Take 15-30 g by mouth every 15 minutes as needed for low blood sugar 15 g 0     hydrochlorothiazide (HYDRODIURIL) 25 MG tablet Take 1.5 tablets (37.5 mg) by mouth daily 135 tablet 0     insulin glargine (LANTUS PEN) 100 UNIT/ML pen Inject 10 Units Subcutaneous At Bedtime 15 mL 0     loperamide (IMODIUM) 2 MG capsule Take 1 capsule (2 mg) by mouth 4 times daily as needed for diarrhea       LORazepam (ATIVAN) 1 MG tablet Take 1 tablet (1 mg) by mouth once as needed for anxiety (Prior to MRI) 1 tablet 0     losartan (COZAAR) 50 MG tablet Take 50 mg by mouth daily       medication given by  implanted intrathecal pump continuous Drug # 1: Fentanyl (Sublimaze) - Conc: 2000 mcg/mL - Total Dose / 24 hours: 1561.9 mcg  Drug # 2: Bupivacaine (Marcaine)  - Conc: 20 mg/mL - Total Dose / 24 hours: 15.619 mg  Drug # 3: Morphine (Duramorph or Infumorph)  - Conc: 9 mg/mL - Total Dose / 24 hours: 7.0287 mg    Rate: 0.0325 mL/hr  Pump Reservoir Volume: 40 mL  Outside Clinic & Provider: Dr. Pawan Shaw, [facility]  Last Refill Date: 2/22/2023  Next Refill Date: 4/6/2023  [place] Alarm Date: 4/4/2023  Pump : Smarp SynchroMed II    Boluses: Up to 3 per day, 3 minute duration. Lock-out every 6 hours  Fentanyl: 99.9 mcg/dose  Bupivacaine: 0.999 mg/dose  Morphine: 0.4497 mg/dose       metFORMIN (GLUCOPHAGE XR) 500 MG 24 hr tablet Take 2 tablets (1,000 mg) by mouth 2 times daily (with meals) 360 tablet 1     metoprolol succinate ER (TOPROL-XL) 25 MG 24 hr tablet Take 2 tablets (50 mg) by mouth daily 180 tablet 0     modafinil (PROVIGIL) 200 MG tablet Take 1.5 tablets (300 mg) by mouth daily 45 tablet 0     multivitamin w/minerals (THERA-VIT-M) tablet Take 1 tablet by mouth daily Men's 50+       nitroGLYcerin (NITROSTAT) 0.4 MG sublingual tablet Place 0.4 mg under the tongue every 5 minutes as needed for chest pain For chest pain place 1 tablet under the tongue every 5 minutes for 3 doses. If symptoms persist 5 minutes after 1st dose call 911.       oxyCODONE-acetaminophen (PERCOCET) 5-325 MG tablet Take 2 tablets by mouth 2 times daily       pregabalin (LYRICA) 100 MG capsule Take 100 mg by mouth Take up to 4 times per day       saccharomyces boulardii (FLORASTOR) 250 MG capsule Take 1 capsule (250 mg) by mouth 2 times daily 14 capsule 0     senna-docusate (SENOKOT-S/PERICOLACE) 8.6-50 MG tablet Take 1-2 tablets by mouth 2 times daily At baseline, Take 1 tablet in the morning and 2 tablets in the evening. May take second tablet in morning if needed.       SUMAtriptan (IMITREX) 50 MG tablet  Take 1 tablet (50 mg) by mouth at onset of headache for migraine May repeat in 2 hours. Max 4 tablets/24 hours. 8 tablet 1     tamsulosin (FLOMAX) 0.4 MG capsule Take 1 capsule (0.4 mg) by mouth 2 times daily 30 capsule 3     testosterone (ANDROGEL/TESTIM) 50 MG/5GM (1%) topical gel Place 50 mg of testosterone onto the skin daily       vitamin D3 (CHOLECALCIFEROL) 1.25 MG (64623 UT) capsule Take 1 capsule (50,000 Units) by mouth every 7 days 90 capsule 3         Physical Exam:     Vital Signs: BP (!) 175/109   Pulse 73   Temp 98  F (36.7  C) (Oral)   Resp 18   Wt 114.7 kg (252 lb 12.8 oz)   SpO2 98%   BMI 40.80 kg/m      ECO  General Appearance: alert, and no distress  Eyes: PERRL, conjunctiva and lids normal, sclera nonicteric  Ears/Nose/M/Throat: Oral mucosa and posterior oropharynx normal, moist mucous membranes  Neck supple, non-tender, free range of motion, no adenopathy  Cardio/Vascular:regular rate and rhythm, normal S1 and S2, no murmur  Resp Effort And Auscultation: Normal - Clear to auscultation without rales, rhonchi, or wheezing.  GI: soft, nontender, bowel sounds present in all four quadrants, no hepatosplenomegaly  Lymphatics:no significant enlargement of lymph nodes globally   Musculoskeletal: Musculoskeletal normal  Edema: none  Skin: Skin color, texture, turgor normal. No rashes or lesions in visualized areas; patient declined to remove clothes for examination of recent RLE cellulitis  Neurologic: Gait normal.  Sensation grossly WNL.  Psych/Affect: Mood and affect are appropriate.    Blood Counts     Recent Labs   Lab Test 23  1153 23  0701 03/10/23  1238 23  0832   HGB 13.3 12.8* 13.6 14.7   HCT 38.3* 36.9* 38.2* 40.5   WBC 9.0 8.8 9.8 10.3   ANEUTAUTO 7.3  --  7.7 8.4*   ALYMPAUTO 0.9  --  1.1 1.0   AMONOAUTO 0.5  --  0.6 0.6   AEOSAUTO 0.3  --  0.3 0.3   ABSBASO 0.1  --  0.1 0.1   NRBCMAN 0.0  --  0.0  --     227 250 249       Chemistries     Basic  Panel  Recent Labs   Lab Test 03/17/23  1153 03/15/23  1226 03/15/23  0907 03/14/23  1321 03/14/23  1102 03/13/23  1227 03/13/23  1053 03/12/23  0805 03/12/23  0616   *  --   --   --  138  --   --   --  134*   POTASSIUM 4.6  --   --   --  5.0  --  4.1   < > 3.3*   CHLORIDE 100  --   --   --  100  --   --   --  95*   CO2 25  --   --   --  28  --   --   --  32*   BUN 16.6  --   --   --  9.0  --   --   --  18.1   CR 0.85  --   --   --  0.71  --   --   --  0.99   * 172* 140*   < > 195*   < >  --    < > 251*    < > = values in this interval not displayed.        Calcium, Magnesium, Phosphorus  Recent Labs   Lab Test 03/17/23  1153 03/14/23  1102 03/12/23  0616 03/11/23  0701 12/03/20  0930 12/03/20  0622 07/27/20  1432 06/29/20  1215 05/04/20  1135 04/22/20  0636   LUIS 9.3 8.8 8.7* 8.6*   < > 8.3*   < > 8.5   < > 8.6   MAG  --   --   --  2.1  --  2.3  --  2.2  --  1.8   PHOS  --   --   --   --   --   --   --   --   --  3.3    < > = values in this interval not displayed.        LFTs  Recent Labs   Lab Test 03/17/23  1153 03/10/23  1238 02/23/23  1227   BILITOTAL 0.6 0.4 0.5   ALKPHOS 130* 138* 115   AST 38 24 29   ALT 28 29 51*   ALBUMIN 3.9 3.7 4.2     Parmjit understood the above assessment and recommendations.  Multiple questions answered.  No barriers to learning identified.      Known issues that I take into account for medical decisions, with salient changes to the plan considering these complexities noted above.    Patient Active Problem List   Diagnosis     Low back pain     Hyperlipidemia     Hypertension goal BP (blood pressure) < 140/90     Type 2 diabetes mellitus (H)     Advanced directives, counseling/discussion     Migraine headache     History of diverticulitis     MENTAL HEALTH     Bipolar II disorder (H)     Generalized abdominal pain     Light chain (AL) amyloidosis (H)     Insomnia due to medical condition     Obstructive sleep apnea syndrome     Restless legs syndrome (RLS)     Leg edema      Morbid obesity (H)     Venous stasis dermatitis of both lower extremities     Polyneuropathy, unspecified     Acquired lymphedema     Low blood pressure reading     History of peripheral stem cell transplant (H)     Other specified anxiety disorders     Abnormal electrocardiography     Bilateral leg edema     Onychomycosis     Thrombocytopenia, unspecified (H)     Muscle weakness (generalized)     Bilateral leg weakness     NSTEMI (non-ST elevated myocardial infarction) (H)     Hyperkalemia     Essential hypertension     Shortness of breath     Chest pain     Peripheral vascular disease (H)     Other cirrhosis of liver (H)     Lymphedema     Cellulitis of right lower extremity     Right upper quadrant pain     Abnormal computed tomography scan     Acquired pancytopenia (H)     Acute postoperative pain     Benign neoplasm of cecum     Benign neoplasm of transverse colon     Benign prostatic hyperplasia     Bilateral cataracts     Bipolar 1 disorder, manic, mild (H)     Cannabis dependence in remission (H)     Cardiovascular stress test abnormal     Chronic fatigue, unspecified     Continuous opioid dependence (H)     Contusion of rib     Coronary arteriosclerosis     DDD (degenerative disc disease), lumbar     Decreased testosterone level     Depressed bipolar I disorder in full remission (H)     Chronic diarrhea     Hernia of anterior abdominal wall     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)     Disorder of refraction     Diverticular disease of colon     Drug induced constipation     Elevated troponin     Guillain-[place] syndrome (H)     History of anaphylaxis due to severe acute respiratory syndrome coronavirus 2 (SARS-CoV-2) vaccine     History of colonic polyps     Hypertensive renal disease     Hypocalcemia     Immunoglobulin deficiency (H)     Localized enlarged lymph nodes     Nonalcoholic steatohepatitis     Noninfectious gastroenteritis     Other muscle spasm     Polyp of colon      Presence of implanted infusion pump     Retention of urine     Spinal stenosis at L4-L5 level     Stasis ulcer (H)     Chronic pain     Cerebral infarction, unspecified (H)     Venous stasis ulcer of other part of left lower leg limited to breakdown of skin without varicose veins (H)     Chronic ulcer of right leg, limited to breakdown of skin (H)       ------------------------------------------------------------------------------------------------------------------------------------------------    Patient Care Team       Relationship Specialty Notifications Start End    Sherwin Mejia DO PCP - General Family Medicine  3/1/23     Phone: [phone] Fax: [phone] [address] [place] [address] [place] [address]    Kuldeep Borrero MD MD Gastroenterology  1/11/16     Phone: [phone] Fax: [phone]         [address] [place], [place] [address] [address] [place] [address]    Vince Henry MD Assigned PCP   2/3/19     Phone: [phone] Fax: [phone]         [facility] [place] [address] [place] [address]    Dylna Jose MD MD Neurology  3/15/23     Phone: [phone] Fax: [phone]         [address] [place] [address]    Kera Jernigan APRN CNP Nurse Practitioner Cardiovascular Disease  3/16/23     Phone: [phone] Fax: [phone] [address] [facility] [address]          Charly Hester MD

## 2023-03-17 NOTE — NURSING NOTE
"Oncology Rooming Note    March 17, 2023 11:05 AM   Parmjit Hernández is a 67 year old male who presents for:    Chief Complaint   Patient presents with     Oncology Clinic Visit     New Eval for Amyloidosis     Initial Vitals: Blood Pressure (Abnormal) 175/109   Pulse 73   Temperature 98  F (36.7  C) (Oral)   Respiration 18   Weight 114.7 kg (252 lb 12.8 oz)   Oxygen Saturation 98%   Body Mass Index 40.80 kg/m   Estimated body mass index is 40.8 kg/m  as calculated from the following:    Height as of 3/16/23: 1.676 m (5' 6\").    Weight as of this encounter: 114.7 kg (252 lb 12.8 oz). Body surface area is 2.31 meters squared.  Data Unavailable Comment: Data Unavailable   No LMP for male patient.  Allergies reviewed: Yes  Medications reviewed: Yes    Medications: Medication refills not needed today.  Pharmacy name entered into [facility]:    [id number] [place], [place] - [address] [address] & [facility]  [id number] [place], [place] - [address] [place] AT [facility]    Clinical concerns: none       Aileen Caban MA            "

## 2023-03-19 NOTE — PROGRESS NOTES
03/17/23 2019   Quick Adds   Quick Adds Certification   Rehab Discipline   Discipline OT   Type of Visit   Type of visit Initial Edema Evaluation   General Information   Start of care 03/17/23   Referring physician Maribeth Ardon MD   Orders Evaluate and treat as indicated   Order date 03/09/23   Medical diagnosis BLE venous stasis dermatitis, lymphedema   Onset of illness / date of surgery 03/09/23   Edema onset 03/09/23   Affected body parts LLE;RLE   Edema etiology Chronic Venous Insufficiency   Edema etiology comments Pt with chronic CVI and lymphedema.  Pt did CDT in 2019 and 2021. Recently had cellulitis infection of nearly the entire RLE. Has exacerbation of swelling.   Pertinent history of current problem (PT: include personal factors and/or comorbidities that impact the POC; OT: include additional occupational profile info) Pt with hx of CVI,  venous ulcers,CHF, diabetes, neuropathy, hernia, HTN, cellulitis, weakness, pain pump, lymphedema..   Surgical / medical history reviewed Yes   Prior level of functional mobility Mod I with walker   Prior treatment Complete decongestive therapy;Compression garments   Community support   (None mentioned.)   Patient role / employment history Employed  (Recently started a business with a .)   Psychosocial concerns Isolation   Living environment [place] [facility]   Current assistive devices Walker   General observations Pt moving slowly with walker.   Fall Risk Screen   Fall screen completed by OT   Have you fallen 2 or more times in the past year? No   Have you fallen and had an injury in the past year? No   Is patient a fall risk? No   Abuse Screen (yes response referral indicated)   Feels Unsafe at Home or Work/School no   Feels Threatened by Someone no   Does Anyone Try to Keep You From Having Contact with Others or Doing Things Outside Your Home? no   Physical Signs of Abuse Present no   System Outcome Measures   Outcome Measures Lymphedema    Lymphedema Life Impact Scale (score range 0-72). A higher score indicates greater impairment. 27   Subjective Report   Patient report of symptoms Pain, 8/10. On Dilaudid   Patient / Family Goals   Patient / family goals statement To have the swelling improve. But I hate compression bandages and wraps.   Pain   Patient currently in pain Yes   Pain location RLE   Pain rating 8/10   Pain description Pressure;Sharp   Pain comments Pain in RLE, pain with any touch. Pt with chronic pain   Vitals Signs   Weight 252   Cognitive Status   Orientation Orientation to person, place and time   Level of consciousness Alert   Edema Exam / Assessment   Skin condition Non-pitting;Venous distention;Dryness;Hemosiderin deposits  (Hyperkeratosis, callus on R toe)   Skin condition comments Pt with hyperpigmentation on B lower legs, stasis dermatitis which is improving with topical medication.  Skin is diffusely dry.   Scar No   Capillary refill Symmetrical   Dorsal pedal pulse Symmetrical   Stemmer sign Positive   Ulceration Yes   Wounds Wound 1   Ulceration comments Has 8 cm x 7 cm ulcer on posterior R calf   Girth Measurements   Girth Measurements Refer to separate girth measurement flowsheet   Volume LE   Right LE (mL) 7594   Left LE (mL) 6152   Strength   Strength comments generalized deconditioning   Activities of Daily Living   Activities of Daily Living Pt  has used  donning aid for compression stockings in the past.   Bed Mobility   Bed mobility mod I   Transfers   Transfers mod I   Gait / Locomotion   Gait / Locomotion mod I   Sensory   Sensory perception comments intermittent numbness   Vascular Assessment   Vascular Assessment Vascular concerns   Planned Edema Interventions   Planned edema interventions Manual lymph drainage;Exercises;Precautions to prevent infection / exacerbation;Home management program development;Fit for compression garment;Gradient compression bandaging   Planned edema intervention comments Will explore  options for compression. Pt does not like bandaging, but acknowledges that it works.   Clinical Impression   Criteria for skilled therapeutic intervention met Yes   Therapy diagnosis lymphedema   Influenced by the following impairments / conditions Phlebolymphedema;Stage 2   Assessment of Occupational Performance 1-3 Performance Deficits   Identified Performance Deficits infection risk, heavy limbs, impacting ambulation, fit of clothing/shoes   Clinical Decision Making (Complexity) Low complexity   Treatment Frequency 3x/week   Treatment duration 2 weeks   Patient / family and/or staff in agreement with plan of care Yes   Risks and benefits of therapy have been explained Yes   Clinical impression comments Pt presents with exacerbation of chronic lymphedema which has responded to compression and MLD in he past.Pt is aggreeable to brief course of therapy to get swelling back under control.FOcus of intervention will be to find effective home program that pt can tolerate and manage independently.   Education Assessment   Preferred learning style Reading;Listening;Pictures / video;Demonstration   Barriers to learning No barriers   Goals   Edema Eval Goals 1;2;3   Goal 1   Goal identifier Home Management   Goal description In order to reduce risk of infection and promote ind with long term management of lymphedema, pt will verbalize understanding of skin care routine, precautions/contraindications, and when to seek further treatment following education.   Target date 06/15/23   Goal 2   Goal identifier HEP   Goal description In order to promote fluid mobilization for increased ease of functional mobility and decreased infection risk, pt will demonstrate independence with self-MLD and/or exercises to facilitate the lymphatic system.   Target date 06/15/23   Goal 3   Goal identifier Garments   Goal description  In order to promote fluid mobilization for increased ease of functional mobility and decreased infection risk,  pt/cg will demonstrate independence with donning, doffing, and care of compression garments following education.   Target date 06/15/23   Goal 4   Goal identifier GCB   Goal description Pt to tolerate 23/24 hours GCB (quick wrap) to promote limb volume reduction, to improve safety with gait.   Target date 06/15/23   Total Evaluation Time   OT Eval, Low Complexity Minutes (79221) 15   Certification   Certification date from 03/17/23   Certification date to 06/15/23   Medical Diagnosis lymphedema   Certification I certify the need for these services furnished under this plan of treatment and while under my care.  (Physician co-signature of this document indicates review and certification of the therapy plan).

## 2023-03-20 LAB
ALBUMIN SERPL ELPH-MCNC: 3.6 G/DL (ref 3.7–5.1)
ALPHA1 GLOB SERPL ELPH-MCNC: 0.5 G/DL (ref 0.2–0.4)
ALPHA2 GLOB SERPL ELPH-MCNC: 0.8 G/DL (ref 0.5–0.9)
B-GLOBULIN SERPL ELPH-MCNC: 0.8 G/DL (ref 0.6–1)
GAMMA GLOB SERPL ELPH-MCNC: 0.5 G/DL (ref 0.7–1.6)
M PROTEIN SERPL ELPH-MCNC: 0 G/DL
PROT PATTERN SERPL ELPH-IMP: ABNORMAL
PROT PATTERN SERPL IFE-IMP: NORMAL

## 2023-03-20 NOTE — PROGRESS NOTES
~Cardiology Clinic Visit~    Primary Cardiologist: Dr. 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  He underwent a coronary angiogram in 2018 through the [facility] system showing normal coronary arteries, and an echocardiogram also in 2018 showing normal LV function.    In review, patient was admitted to [facility] because of GI issues in April 2020, at that time was noted to have mild troponin elevation.  Prior to that encounter he had seen his PCP for follow-up and had noted concern about chest discomfort.  He was later seen by Dr. Montague in follow up for a video visit in June 2020. The chest discomfort had resolved and was quite atypical in nature described as a rare, intermittent sharp discomfort lasting for a few seconds to a few minutes, mild in intensity. They are nonexertional, no particular shortness of breath and he specifically denied any sweating along with those episodes. This was felt to be most likely musculoskeletal in nature. The option of a cardiac MRI with stress was discussed but the patient ultimately deferred at that time since his symptoms had resolved.     His EKG was reviewed and they showed sinus rhythm with inferior Q waves which appeared similar to previous EKGs in 2018 without any new changes.     A cardiac MRI with stress was completed on 08/18/2020 showing normal LV and RV systolic functions with no evidence of inducible ischemia. Late gadolinium enhancement imaging showed patchy delayed enhancement in the basal septal wall consistent with non CAD non specific  scar. No evidence of current amyloid was noted however given elevated troponin and EKG findings, it was noted that could still have early process.     A yearly cardiac MRI was suggested for ongoing surveillance, however patient has significant claustrophobia which limits our use of this testing modality.  At this point, we have determined that echo surveillance is appropriate moving forward.    When he last saw Dr. Stoddard in 2021, he continued to have intermittent, random onset angina.  It was not related to exertion, and sometimes came on at rest.  At times he had been sitting in a chair watching TV, and would get as sharp, fleeting chest pain.  He did note at that time that anxiety would elevate this symptoms.  He had no associated palpitations, syncope or LE edema.    He did have a coronary angiogram in [place] on 11/10/2021 which revealed 60 to 70% focal stenosis of proximal segment of right posterolateral artery --> Successful PCI using a 2.25 x 15 mm Xience ETIENNE dilated to 2.5 mm.  Noted angiographically normal left coronary system, and normal LVEDP at 5 mmHg.  He has been on DAPT, clopidogrel and ASA, since stenting.    Interval 03/21/23    He unfortunately has had some medical complications that he discusses with me today - he has suffered with leg wounds from longstanding diabetes, and also had cellulitis for which he sought hospital care just a few weeks ago.  However, from a cardiac standpoint, he has not had any angina recurrence since his stent was placed.  He has not needed to take any nitroglycerin since he was here last.  But overall, no new symptoms or limiting cardiac concerns.  __________________________________________________________________         Assessment and Impression:     Coronary Artery Disease  S/p PCI with ETIENNE x1 to right posterolateral artery, 11/2021  Hx Non-STEMI, likely demand MI  - Normal coronary angiography in 2018 at [facility] and normal recent cardiac MRI  stress study as noted above.  -Coronary angiogram with PCI as noted above.  -Pt without any recurrent symptoms.  -He has remained on DAPT after completing 1-year therapy post-stent placement.  No bleed concerns.  -On atorvastatin, asa, clopidogrel, toprol     Systemic amyloidosis   -Cardiac MRI with stress 08/18/2020 showing normal LV and RV systolic functions with no evidence of inducible ischemia. Late gadolinium enhancement imaging showed patchy delayed enhancement in the basal septal wall consistent with non CAD, non-specific scar.   --> No evidence of current amyloid was noted, however given elevated troponin and EKG findings he could still have early process.   -Suggest serial imaging with echocardiogram to monitor cardiac involvement.  -Oncology has placed repeat echocardiogram order.    Hypertension, currently well-controlled, on hydrochlorothiazide, losartan, toprol.  Hyperlipidemia, treated on atorvastatin 30 mg daily  Diabetes Mellitus, with end-organ manifestations of polyneuropathy and edema  Restless leg syndrome  Obstructive sleep apnea, faithful with CPAP use  Obesity  Bipolar, anxiety and depression disorder  Concerns for cognitive impairment, establishing with Neurology 04/2023.         Recommendations and Plan:     1. Ok to stop clopidogrel.  Continue with aspirin 81 mg daily.  2. Continue with all other cardiac medications without changes.  3. Continue healthy diet and daily exercise as you are able to improve overall health.  4. Get a repeat echocardiogram, as ordered by her oncologist.  We will review these results once completed.  5. Follow-up with 1 year with annual visit with Dr. Stoddard.  __________________________________________________________________    Thank you for the opportunity to participate in this pleasant patient's care.    We would be happy to see this patient sooner for any concerns in the meantime.    JUNIOR Alva, CNP   Nurse Practitioner  [facility] -  [facility]    Today's clinic visit entailed:  Review of the result(s) of each unique test - labs, hospital reports and summary, cardiac imaging and testing  Ordering of each unique test  Prescription drug management  The level of medical decision making during this visit was of moderate complexity.    Orders this Visit:  Orders Placed This Encounter   Procedures     Follow-Up with Cardiology     No orders of the defined types were placed in this encounter.    Medications Discontinued During This Encounter   Medication Reason     clopidogrel (PLAVIX) 75 MG tablet      Encounter Diagnoses   Name Primary?     NSTEMI (non-ST elevated myocardial infarction) (H)      Morbid obesity (H) Yes     Mixed hyperlipidemia      Hypertension goal BP (blood pressure) < 140/90      Peripheral vascular disease (H)      Light chain (AL) amyloidosis (H)      CURRENT MEDICATIONS:  Current Outpatient Medications   Medication Sig Dispense Refill     acetaminophen (TYLENOL) 500 MG tablet Take 1,000 mg by mouth daily as needed for mild pain Takes overnight       acetaminophen (TYLENOL) 500 MG tablet Take 500 mg by mouth 2 times daily       ammonium lactate (AMLACTIN) 12 % external cream Apply topically 2 times daily Apply to both feet and legs 2 times daily 385 g 1     aspirin (ASPIRIN LOW DOSE) 81 MG tablet Take 1 tablet (81 mg) by mouth daily 30 tablet 3     atorvastatin (LIPITOR) 20 MG tablet Take 1.5 tablets (30 mg) by mouth daily 135 tablet 3     Bioflavonoid Products (CANDIDO-C) TABS Take 1,000 mg by mouth daily 90 tablet 3     blood glucose (NO BRAND SPECIFIED) lancets standard Use to test blood sugar 1 time daily or as directed. 100 lancet 4     blood glucose (NO BRAND SPECIFIED) test strip Use to test blood sugar 1 time daily or as directed. 200 strip 4     buPROPion (WELLBUTRIN XL) 150 MG 24 hr tablet Take 150 mg by mouth every morning       cariprazine (VRAYLAR) 4.5 MG capsule Take 4.5 mg by mouth daily       cephALEXin (KEFLEX) 500  MG capsule Take 1 capsule (500 mg) by mouth every 8 hours for 6 days 18 capsule 0     DULoxetine (CYMBALTA) 30 MG capsule Take 30 mg by mouth 2 times daily       Folic Acid-Vit B6-Vit B12 0.5-5-0.2 MG TABS Take 1 tablet by mouth daily 90 tablet 3     glucose 40 % (400 mg/mL) gel Take 15-30 g by mouth every 15 minutes as needed for low blood sugar 15 g 0     hydrochlorothiazide (HYDRODIURIL) 25 MG tablet Take 1.5 tablets (37.5 mg) by mouth daily 135 tablet 0     insulin glargine (LANTUS PEN) 100 UNIT/ML pen Inject 10 Units Subcutaneous At Bedtime 15 mL 0     loperamide (IMODIUM) 2 MG capsule Take 1 capsule (2 mg) by mouth 4 times daily as needed for diarrhea       LORazepam (ATIVAN) 1 MG tablet Take 1 tablet (1 mg) by mouth once as needed for anxiety (Prior to MRI) 1 tablet 0     losartan (COZAAR) 50 MG tablet Take 50 mg by mouth daily       medication given by implanted intrathecal pump continuous Drug # 1: Fentanyl (Sublimaze) - Conc: 2000 mcg/mL - Total Dose / 24 hours: 1561.9 mcg  Drug # 2: Bupivacaine (Marcaine)  - Conc: 20 mg/mL - Total Dose / 24 hours: 15.619 mg  Drug # 3: Morphine (Duramorph or Infumorph)  - Conc: 9 mg/mL - Total Dose / 24 hours: 7.0287 mg    Rate: 0.0325 mL/hr  Pump Reservoir Volume: 40 mL  Outside Clinic & Provider: Dr. Pawan Shaw, [facility]  Last Refill Date: 2/22/2023  Next Refill Date: 4/6/2023  Low [place] Alarm Date: 4/4/2023  Pump : Medtronic SynchroMed II    Boluses: Up to 3 per day, 3 minute duration. Lock-out every 6 hours  Fentanyl: 99.9 mcg/dose  Bupivacaine: 0.999 mg/dose  Morphine: 0.4497 mg/dose       metFORMIN (GLUCOPHAGE XR) 500 MG 24 hr tablet Take 2 tablets (1,000 mg) by mouth 2 times daily (with meals) 360 tablet 1     metoprolol succinate ER (TOPROL-XL) 25 MG 24 hr tablet Take 2 tablets (50 mg) by mouth daily 180 tablet 0     modafinil (PROVIGIL) 200 MG tablet Take 1.5 tablets (300 mg) by mouth daily 45 tablet 0     multivitamin w/minerals  (THERA-VIT-M) tablet Take 1 tablet by mouth daily Men's 50+       nitroGLYcerin (NITROSTAT) 0.4 MG sublingual tablet Place 0.4 mg under the tongue every 5 minutes as needed for chest pain For chest pain place 1 tablet under the tongue every 5 minutes for 3 doses. If symptoms persist 5 minutes after 1st dose call 911.       oxyCODONE-acetaminophen (PERCOCET) 5-325 MG tablet Take 2 tablets by mouth 2 times daily       pregabalin (LYRICA) 100 MG capsule Take 100 mg by mouth Take up to 4 times per day       saccharomyces boulardii (FLORASTOR) 250 MG capsule Take 1 capsule (250 mg) by mouth 2 times daily 14 capsule 0     senna-docusate (SENOKOT-S/PERICOLACE) 8.6-50 MG tablet Take 1-2 tablets by mouth 2 times daily At baseline, Take 1 tablet in the morning and 2 tablets in the evening. May take second tablet in morning if needed.       SUMAtriptan (IMITREX) 50 MG tablet Take 1 tablet (50 mg) by mouth at onset of headache for migraine May repeat in 2 hours. Max 4 tablets/24 hours. 8 tablet 1     tamsulosin (FLOMAX) 0.4 MG capsule Take 1 capsule (0.4 mg) by mouth 2 times daily 30 capsule 3     testosterone (ANDROGEL/TESTIM) 50 MG/5GM (1%) topical gel Place 50 mg of testosterone onto the skin daily       vitamin D3 (CHOLECALCIFEROL) 1.25 MG (32268 UT) capsule Take 1 capsule (50,000 Units) by mouth every 7 days 90 capsule 3     dulaglutide (TRULICITY) 0.75 MG/0.5ML pen Inject 0.75 mg Subcutaneous every 7 days (Patient not taking: Reported on 3/21/2023) 2 mL 3     ALLERGIES     Allergies   Allergen Reactions     Cephalexin Diarrhea     Liraglutide Other (See Comments), Headache and Unknown     Other reaction(s): Headache, Unknown  PN: migraines - Increased migraine frequency and severity       Sulfa Drugs Angioedema and Swelling     Pt has taken  Taken sulfa drugs orally without trouble. He had problems with Sulfa eye drops. Eye swelled up       Victoza      Increased migraine frequency and severity     PAST MEDICAL, SURGICAL,  "FAMILY, SOCIAL HISTORY:  History was reviewed and updated as needed, see medical record.    Review of Systems:  Review Of Systems  Skin: negative  Eyes: negative  Ears/Nose/Throat: negative  Respiratory: No shortness of breath, dyspnea on exertion, cough, or hemoptysis  Cardiovascular: negative for, irregular heart beat, chest pain, exertional chest pain or pressure, paroxysmal nocturnal dyspnea, dyspnea on exertion, orthopnea, lower extremity edema and syncope or near-syncope  Gastrointestinal: negative  Genitourinary: negative  Musculoskeletal: positive for msk shoulder discomfort  Neurologic: negative  Psychiatric: negative  Hematologic/Lymphatic/Immunologic: negative  Endocrine: negative     Physical Exam:    Vitals: /68   Pulse 80   Ht 1.676 m (5' 6\")   Wt 111.2 kg (245 lb 3.2 oz)   SpO2 96%   BMI 39.58 kg/m    Constitutional: Appears stated age, well nourished, NAD.  Eyes: Pupils equal, round. Sclerae anicteric.   HEENT: Normocephalic, atraumatic.   Neck: Supple. Carotid pulses full and equal.   Respiratory: Non-labored. Lungs CTAB.  Cardiovascular: RRR, normal S1 and S2. No M/G/R.  No edema.  GI: Soft, non-distended, non-tender.  Skin: Warm and dry. No rashes, cyanosis, edema.  Musculoskeletal/Extremities: Symmetrical movement to all extremities.  Neurologic: No gross focal deficits. Alert, awake, and oriented to all spheres.  Psychiatric: Affect appropriate. Mentation normal.    Recent Lab Results:  LIPID RESULTS:  Lab Results   Component Value Date    CHOL 153 02/23/2023    CHOL 107 02/16/2021    HDL 35 (L) 02/23/2023    HDL 22 (L) 02/16/2021    LDL 79 02/23/2023    LDL 29 02/16/2021    TRIG 197 (H) 02/23/2023    TRIG 164 (H) 07/12/2021    TRIG 279 (H) 02/16/2021    CHOLHDLRATIO 5.9 (H) 09/01/2015     LIVER ENZYME RESULTS:  Lab Results   Component Value Date    AST 38 03/17/2023    AST 17 06/01/2021    ALT 28 03/17/2023    ALT 31 06/01/2021     CBC RESULTS:  Lab Results   Component Value Date    " WBC 9.0 03/17/2023    WBC 5.5 06/01/2021    RBC 4.13 (L) 03/17/2023    RBC 4.29 (L) 06/01/2021    HGB 13.3 03/17/2023    HGB 13.5 06/01/2021    HCT 38.3 (L) 03/17/2023    HCT 39.1 (L) 06/01/2021    MCV 93 03/17/2023    MCV 91 06/01/2021    MCH 32.2 03/17/2023    MCH 31.5 06/01/2021    MCHC 34.7 03/17/2023    MCHC 34.5 06/01/2021    RDW 13.5 03/17/2023    RDW 12.9 06/01/2021     03/17/2023     (L) 06/01/2021     BMP RESULTS:  Lab Results   Component Value Date     (L) 03/17/2023     06/01/2021    POTASSIUM 4.6 03/17/2023    POTASSIUM 3.9 06/01/2021    CHLORIDE 100 03/17/2023    CHLORIDE 102 06/01/2021    CO2 25 03/17/2023    CO2 26 06/01/2021    ANIONGAP 10 03/17/2023    ANIONGAP 8 06/01/2021     (H) 03/17/2023     (H) 03/15/2023     (H) 06/01/2021    BUN 16.6 03/17/2023    BUN 19 06/01/2021    CR 0.85 03/17/2023    CR 0.88 06/01/2021    GFRESTIMATED >90 03/17/2023    GFRESTIMATED >90 06/01/2021    GFRESTBLACK >90 06/01/2021    LUIS 9.3 03/17/2023    LUIS 8.3 (L) 06/01/2021      A1C RESULTS:  Lab Results   Component Value Date    A1C 9.3 (H) 02/23/2023    A1C 5.8 (H) 05/07/2021     INR RESULTS:  Lab Results   Component Value Date    INR 1.18 (H) 04/17/2020    INR 1.06 01/27/2018

## 2023-03-20 NOTE — PROGRESS NOTES
[facility]        OUTPATIENT OCCUPATIONAL THERAPY EDEMA EVALUATION  PLAN OF TREATMENT FOR OUTPATIENT REHABILITATION  (COMPLETE FOR INITIAL CLAIMS ONLY)  Patient's Last Name, First Name, Parmjit Hartley                           Provider s Name:   [facility] Medical Record No.  [id number]     Start of Care Date:  03/17/23   Onset Date:  03/09/23   Type:  OT   Medical Diagnosis:  lymphedema   Therapy Diagnosis:  lymphedema Visits from SOC:  1                                     __________________________________________________________________________________   Plan of Treatment/Functional Goals:    Manual lymph drainage, Exercises, Precautions to prevent infection / exacerbation, Home management program development, Fit for compression garment, Gradient compression bandaging  Will explore options for compression. Pt does not like bandaging, but acknowledges that it works.     GOALS  1. Goal description: In order to reduce risk of infection and promote ind with long term management of lymphedema, pt will verbalize understanding of skin care routine, precautions/contraindications, and when to seek further treatment following education.       Target date: 06/15/23  2. Goal description: In order to promote fluid mobilization for increased ease of functional mobility and decreased infection risk, pt will demonstrate independence with self-MLD and/or exercises to facilitate the lymphatic system.       Target date: 06/15/23  3. Goal description:  In order to promote fluid mobilization for increased ease of functional mobility and decreased infection risk, pt/cg will demonstrate independence with donning, doffing, and care of compression garments following education.       Target date: 06/15/23  4. Goal description: Pt to tolerate 23/24 hours GCB (quick wrap) to promote  limb volume reduction, to improve safety with gait.       Target date: 06/15/23  5.            6.               7.             8.              Treatment Frequency: 3x/week   Treatment duration: 2 weeks    Avelina Bronson OT                                    I CERTIFY THE NEED FOR THESE SERVICES FURNISHED UNDER        THIS PLAN OF TREATMENT AND WHILE UNDER MY CARE     (Physician co-signature of this document indicates review and certification of the therapy plan).                   Certification date from: 03/17/23       Certification date to: 06/15/23           Referring physician: Maribeth Ardon MD   Initial Assessment  See Epic Evaluation- Start of care: 03/17/23

## 2023-03-21 ENCOUNTER — TRANSFERRED RECORDS (OUTPATIENT)
Dept: HEALTH INFORMATION MANAGEMENT | Facility: CLINIC | Age: 68
End: 2023-03-21

## 2023-03-21 ENCOUNTER — OFFICE VISIT (OUTPATIENT)
Dept: CARDIOLOGY | Facility: CLINIC | Age: 68
End: 2023-03-21
Payer: COMMERCIAL

## 2023-03-21 VITALS
OXYGEN SATURATION: 96 % | WEIGHT: 245.2 LBS | HEIGHT: 66 IN | BODY MASS INDEX: 39.41 KG/M2 | SYSTOLIC BLOOD PRESSURE: 124 MMHG | HEART RATE: 80 BPM | DIASTOLIC BLOOD PRESSURE: 68 MMHG

## 2023-03-21 DIAGNOSIS — I73.9 PERIPHERAL VASCULAR DISEASE (H): ICD-10-CM

## 2023-03-21 DIAGNOSIS — E78.2 MIXED HYPERLIPIDEMIA: ICD-10-CM

## 2023-03-21 DIAGNOSIS — I10 HYPERTENSION GOAL BP (BLOOD PRESSURE) < 140/90: ICD-10-CM

## 2023-03-21 DIAGNOSIS — E66.01 MORBID OBESITY (H): Primary | ICD-10-CM

## 2023-03-21 DIAGNOSIS — E85.81 LIGHT CHAIN (AL) AMYLOIDOSIS (H): ICD-10-CM

## 2023-03-21 DIAGNOSIS — I21.4 NSTEMI (NON-ST ELEVATED MYOCARDIAL INFARCTION) (H): ICD-10-CM

## 2023-03-21 LAB
IGA SERPL-MCNC: 50 MG/DL (ref 84–499)
IGG SERPL-MCNC: 490 MG/DL (ref 610–1616)
IGM SERPL-MCNC: 72 MG/DL (ref 35–242)
KAPPA LC FREE SER-MCNC: 1.65 MG/DL (ref 0.33–1.94)
KAPPA LC FREE/LAMBDA FREE SER NEPH: 0.99 {RATIO} (ref 0.26–1.65)
LAMBDA LC FREE SERPL-MCNC: 1.66 MG/DL (ref 0.57–2.63)

## 2023-03-21 PROCEDURE — 99214 OFFICE O/P EST MOD 30 MIN: CPT | Performed by: NURSE PRACTITIONER

## 2023-03-21 NOTE — LETTER
3/21/2023    Sherwin Mcdermott, DO  [address]  [facility] [address]    RE: Parmjit Hernández       Dear Colleague,     I had the pleasure of seeing Parmjit Hernández in the [facility].              ~Cardiology Clinic Visit~    Primary Cardiologist: Dr. 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  He underwent a coronary angiogram in 2018 through the [facility] system showing normal coronary arteries, and an echocardiogram also in 2018 showing normal LV function.    In review, patient was admitted to [facility] because of GI issues in April 2020, at that time was noted to have mild troponin elevation.  Prior to that encounter he had seen his PCP for follow-up and had noted concern about chest discomfort.  He was later seen by Dr. Montague in follow up for a video visit in June 2020. The chest discomfort had resolved and was quite atypical in nature described as a rare, intermittent sharp discomfort lasting for a few seconds to a few minutes, mild in intensity. They are nonexertional, no particular shortness of breath and he specifically denied any sweating along with those episodes. This was felt to be most likely musculoskeletal in nature. The option of a cardiac MRI with stress was discussed but the patient ultimately deferred at that time since his symptoms had resolved.     His EKG was reviewed and they showed sinus rhythm with inferior Q waves which appeared similar to previous EKGs in 2018 without any new changes.     A cardiac MRI with stress was completed on  08/18/2020 showing normal LV and RV systolic functions with no evidence of inducible ischemia. Late gadolinium enhancement imaging showed patchy delayed enhancement in the basal septal wall consistent with non CAD non specific scar. No evidence of current amyloid was noted however given elevated troponin and EKG findings, it was noted that could still have early process.     A yearly cardiac MRI was suggested for ongoing surveillance, however patient has significant claustrophobia which limits our use of this testing modality.  At this point, we have determined that echo surveillance is appropriate moving forward.    When he last saw Dr. Stoddard in 2021, he continued to have intermittent, random onset angina.  It was not related to exertion, and sometimes came on at rest.  At times he had been sitting in a chair watching TV, and would get as sharp, fleeting chest pain.  He did note at that time that anxiety would elevate this symptoms.  He had no associated palpitations, syncope or LE edema.    He did have a coronary angiogram in [place] on 11/10/2021 which revealed 60 to 70% focal stenosis of proximal segment of right posterolateral artery --> Successful PCI using a 2.25 x 15 mm Xience ETIENNE dilated to 2.5 mm.  Noted angiographically normal left coronary system, and normal LVEDP at 5 mmHg.  He has been on DAPT, clopidogrel and ASA, since stenting.    Interval 03/21/23    He unfortunately has had some medical complications that he discusses with me today - he has suffered with leg wounds from longstanding diabetes, and also had cellulitis for which he sought hospital care just a few weeks ago.  However, from a cardiac standpoint, he has not had any angina recurrence since his stent was placed.  He has not needed to take any nitroglycerin since he was here last.  But overall, no new symptoms or limiting cardiac concerns.  __________________________________________________________________         Assessment and  Impression:     Coronary Artery Disease  S/p PCI with ETIENNE x1 to right posterolateral artery, 11/2021  Hx Non-STEMI, likely demand MI  - Normal coronary angiography in 2018 at [facility] and normal recent cardiac MRI stress study as noted above.  -Coronary angiogram with PCI as noted above.  -Pt without any recurrent symptoms.  -He has remained on DAPT after completing 1-year therapy post-stent placement.  No bleed concerns.  -On atorvastatin, asa, clopidogrel, toprol     Systemic amyloidosis   -Cardiac MRI with stress 08/18/2020 showing normal LV and RV systolic functions with no evidence of inducible ischemia. Late gadolinium enhancement imaging showed patchy delayed enhancement in the basal septal wall consistent with non CAD, non-specific scar.   --> No evidence of current amyloid was noted, however given elevated troponin and EKG findings he could still have early process.   -Suggest serial imaging with echocardiogram to monitor cardiac involvement.  -Oncology has placed repeat echocardiogram order.    Hypertension, currently well-controlled, on hydrochlorothiazide, losartan, toprol.  Hyperlipidemia, treated on atorvastatin 30 mg daily  Diabetes Mellitus, with end-organ manifestations of polyneuropathy and edema  Restless leg syndrome  Obstructive sleep apnea, faithful with CPAP use  Obesity  Bipolar, anxiety and depression disorder  Concerns for cognitive impairment, establishing with Neurology 04/2023.         Recommendations and Plan:     1. Ok to stop clopidogrel.  Continue with aspirin 81 mg daily.  2. Continue with all other cardiac medications without changes.  3. Continue healthy diet and daily exercise as you are able to improve overall health.  4. Get a repeat echocardiogram, as ordered by her oncologist.  We will review these results once completed.  5. Follow-up with 1 year with annual visit with Dr. Stoddard.  __________________________________________________________________    Thank you  for the opportunity to participate in this pleasant patient's care.    We would be happy to see this patient sooner for any concerns in the meantime.    JUNIOR Alva, CNP   Nurse Practitioner  [facility]    Today's clinic visit entailed:  Review of the result(s) of each unique test - labs, hospital reports and summary, cardiac imaging and testing  Ordering of each unique test  Prescription drug management  The level of medical decision making during this visit was of moderate complexity.    Orders this Visit:  Orders Placed This Encounter   Procedures     Follow-Up with Cardiology     No orders of the defined types were placed in this encounter.    Medications Discontinued During This Encounter   Medication Reason     clopidogrel (PLAVIX) 75 MG tablet      Encounter Diagnoses   Name Primary?     NSTEMI (non-ST elevated myocardial infarction) (H)      Morbid obesity (H) Yes     Mixed hyperlipidemia      Hypertension goal BP (blood pressure) < 140/90      Peripheral vascular disease (H)      Light chain (AL) amyloidosis (H)      CURRENT MEDICATIONS:  Current Outpatient Medications   Medication Sig Dispense Refill     acetaminophen (TYLENOL) 500 MG tablet Take 1,000 mg by mouth daily as needed for mild pain Takes overnight       acetaminophen (TYLENOL) 500 MG tablet Take 500 mg by mouth 2 times daily       ammonium lactate (AMLACTIN) 12 % external cream Apply topically 2 times daily Apply to both feet and legs 2 times daily 385 g 1     aspirin (ASPIRIN LOW DOSE) 81 MG tablet Take 1 tablet (81 mg) by mouth daily 30 tablet 3     atorvastatin (LIPITOR) 20 MG tablet Take 1.5 tablets (30 mg) by mouth daily 135 tablet 3     Bioflavonoid Products (CANDIDO-C) TABS Take 1,000 mg by mouth daily 90 tablet 3     blood glucose (NO BRAND SPECIFIED) lancets standard Use to test blood sugar 1 time daily or as directed. 100 lancet 4     blood glucose (NO BRAND SPECIFIED) test strip Use to test blood sugar  1 time daily or as directed. 200 strip 4     buPROPion (WELLBUTRIN XL) 150 MG 24 hr tablet Take 150 mg by mouth every morning       cariprazine (VRAYLAR) 4.5 MG capsule Take 4.5 mg by mouth daily       cephALEXin (KEFLEX) 500 MG capsule Take 1 capsule (500 mg) by mouth every 8 hours for 6 days 18 capsule 0     DULoxetine (CYMBALTA) 30 MG capsule Take 30 mg by mouth 2 times daily       Folic Acid-Vit B6-Vit B12 0.5-5-0.2 MG TABS Take 1 tablet by mouth daily 90 tablet 3     glucose 40 % (400 mg/mL) gel Take 15-30 g by mouth every 15 minutes as needed for low blood sugar 15 g 0     hydrochlorothiazide (HYDRODIURIL) 25 MG tablet Take 1.5 tablets (37.5 mg) by mouth daily 135 tablet 0     insulin glargine (LANTUS PEN) 100 UNIT/ML pen Inject 10 Units Subcutaneous At Bedtime 15 mL 0     loperamide (IMODIUM) 2 MG capsule Take 1 capsule (2 mg) by mouth 4 times daily as needed for diarrhea       LORazepam (ATIVAN) 1 MG tablet Take 1 tablet (1 mg) by mouth once as needed for anxiety (Prior to MRI) 1 tablet 0     losartan (COZAAR) 50 MG tablet Take 50 mg by mouth daily       medication given by implanted intrathecal pump continuous Drug # 1: Fentanyl (Sublimaze) - Conc: 2000 mcg/mL - Total Dose / 24 hours: 1561.9 mcg  Drug # 2: Bupivacaine (Marcaine)  - Conc: 20 mg/mL - Total Dose / 24 hours: 15.619 mg  Drug # 3: Morphine (Duramorph or Infumorph)  - Conc: 9 mg/mL - Total Dose / 24 hours: 7.0287 mg    Rate: 0.0325 mL/hr  Pump Reservoir Volume: 40 mL  Outside Clinic & Provider: [facility]  Last Refill Date: 2/22/2023  Next Refill Date: 4/6/2023  Low [place]anna Alarm Date: 4/4/2023  Pump : [employer] SynchroMed II    Boluses: Up to 3 per day, 3 minute duration. Lock-out every 6 hours  Fentanyl: 99.9 mcg/dose  Bupivacaine: 0.999 mg/dose  Morphine: 0.4497 mg/dose       metFORMIN (GLUCOPHAGE XR) 500 MG 24 hr tablet Take 2 tablets (1,000 mg) by mouth 2 times daily (with meals) 360 tablet 1      metoprolol succinate ER (TOPROL-XL) 25 MG 24 hr tablet Take 2 tablets (50 mg) by mouth daily 180 tablet 0     modafinil (PROVIGIL) 200 MG tablet Take 1.5 tablets (300 mg) by mouth daily 45 tablet 0     multivitamin w/minerals (THERA-VIT-M) tablet Take 1 tablet by mouth daily Men's 50+       nitroGLYcerin (NITROSTAT) 0.4 MG sublingual tablet Place 0.4 mg under the tongue every 5 minutes as needed for chest pain For chest pain place 1 tablet under the tongue every 5 minutes for 3 doses. If symptoms persist 5 minutes after 1st dose call 911.       oxyCODONE-acetaminophen (PERCOCET) 5-325 MG tablet Take 2 tablets by mouth 2 times daily       pregabalin (LYRICA) 100 MG capsule Take 100 mg by mouth Take up to 4 times per day       saccharomyces boulardii (FLORASTOR) 250 MG capsule Take 1 capsule (250 mg) by mouth 2 times daily 14 capsule 0     senna-docusate (SENOKOT-S/PERICOLACE) 8.6-50 MG tablet Take 1-2 tablets by mouth 2 times daily At baseline, Take 1 tablet in the morning and 2 tablets in the evening. May take second tablet in morning if needed.       SUMAtriptan (IMITREX) 50 MG tablet Take 1 tablet (50 mg) by mouth at onset of headache for migraine May repeat in 2 hours. Max 4 tablets/24 hours. 8 tablet 1     tamsulosin (FLOMAX) 0.4 MG capsule Take 1 capsule (0.4 mg) by mouth 2 times daily 30 capsule 3     testosterone (ANDROGEL/TESTIM) 50 MG/5GM (1%) topical gel Place 50 mg of testosterone onto the skin daily       vitamin D3 (CHOLECALCIFEROL) 1.25 MG (95667 UT) capsule Take 1 capsule (50,000 Units) by mouth every 7 days 90 capsule 3     dulaglutide (TRULICITY) 0.75 MG/0.5ML pen Inject 0.75 mg Subcutaneous every 7 days (Patient not taking: Reported on 3/21/2023) 2 mL 3     ALLERGIES     Allergies   Allergen Reactions     Cephalexin Diarrhea     Liraglutide Other (See Comments), Headache and Unknown     Other reaction(s): Headache, Unknown  PN: migraines - Increased migraine frequency and severity       Sulfa Drugs  "Angioedema and Swelling     Pt has taken  Taken sulfa drugs orally without trouble. He had problems with Sulfa eye drops. Eye swelled up       Victoza      Increased migraine frequency and severity     PAST MEDICAL, SURGICAL, FAMILY, SOCIAL HISTORY:  History was reviewed and updated as needed, see medical record.    Review of Systems:  Review Of Systems  Skin: negative  Eyes: negative  Ears/Nose/Throat: negative  Respiratory: No shortness of breath, dyspnea on exertion, cough, or hemoptysis  Cardiovascular: negative for, irregular heart beat, chest pain, exertional chest pain or pressure, paroxysmal nocturnal dyspnea, dyspnea on exertion, orthopnea, lower extremity edema and syncope or near-syncope  Gastrointestinal: negative  Genitourinary: negative  Musculoskeletal: positive for msk shoulder discomfort  Neurologic: negative  Psychiatric: negative  Hematologic/Lymphatic/Immunologic: negative  Endocrine: negative     Physical Exam:    Vitals: /68   Pulse 80   Ht 1.676 m (5' 6\")   Wt 111.2 kg (245 lb 3.2 oz)   SpO2 96%   BMI 39.58 kg/m    Constitutional: Appears stated age, well nourished, NAD.  Eyes: Pupils equal, round. Sclerae anicteric.   HEENT: Normocephalic, atraumatic.   Neck: Supple. Carotid pulses full and equal.   Respiratory: Non-labored. Lungs CTAB.  Cardiovascular: RRR, normal S1 and S2. No M/G/R.  No edema.  GI: Soft, non-distended, non-tender.  Skin: Warm and dry. No rashes, cyanosis, edema.  Musculoskeletal/Extremities: Symmetrical movement to all extremities.  Neurologic: No gross focal deficits. Alert, awake, and oriented to all spheres.  Psychiatric: Affect appropriate. Mentation normal.    Recent Lab Results:  LIPID RESULTS:  Lab Results   Component Value Date    CHOL 153 02/23/2023    CHOL 107 02/16/2021    HDL 35 (L) 02/23/2023    HDL 22 (L) 02/16/2021    LDL 79 02/23/2023    LDL 29 02/16/2021    TRIG 197 (H) 02/23/2023    TRIG 164 (H) 07/12/2021    TRIG 279 (H) 02/16/2021    " CHOLHDLRATIO 5.9 (H) 09/01/2015     LIVER ENZYME RESULTS:  Lab Results   Component Value Date    AST 38 03/17/2023    AST 17 06/01/2021    ALT 28 03/17/2023    ALT 31 06/01/2021     CBC RESULTS:  Lab Results   Component Value Date    WBC 9.0 03/17/2023    WBC 5.5 06/01/2021    RBC 4.13 (L) 03/17/2023    RBC 4.29 (L) 06/01/2021    HGB 13.3 03/17/2023    HGB 13.5 06/01/2021    HCT 38.3 (L) 03/17/2023    HCT 39.1 (L) 06/01/2021    MCV 93 03/17/2023    MCV 91 06/01/2021    MCH 32.2 03/17/2023    MCH 31.5 06/01/2021    MCHC 34.7 03/17/2023    MCHC 34.5 06/01/2021    RDW 13.5 03/17/2023    RDW 12.9 06/01/2021     03/17/2023     (L) 06/01/2021     BMP RESULTS:  Lab Results   Component Value Date     (L) 03/17/2023     06/01/2021    POTASSIUM 4.6 03/17/2023    POTASSIUM 3.9 06/01/2021    CHLORIDE 100 03/17/2023    CHLORIDE 102 06/01/2021    CO2 25 03/17/2023    CO2 26 06/01/2021    ANIONGAP 10 03/17/2023    ANIONGAP 8 06/01/2021     (H) 03/17/2023     (H) 03/15/2023     (H) 06/01/2021    BUN 16.6 03/17/2023    BUN 19 06/01/2021    CR 0.85 03/17/2023    CR 0.88 06/01/2021    GFRESTIMATED >90 03/17/2023    GFRESTIMATED >90 06/01/2021    GFRESTBLACK >90 06/01/2021    LUIS 9.3 03/17/2023    LUIS 8.3 (L) 06/01/2021      A1C RESULTS:  Lab Results   Component Value Date    A1C 9.3 (H) 02/23/2023    A1C 5.8 (H) 05/07/2021     INR RESULTS:  Lab Results   Component Value Date    INR 1.18 (H) 04/17/2020    INR 1.06 01/27/2018           Thank you for allowing me to participate in the care of your patient.      Sincerely,     JUNIOR Alva [facility]  cc:   Sherwin Mcdermott,   [address] [address]

## 2023-03-21 NOTE — PATIENT INSTRUCTIONS
Thank you for your visit with the [facility] today.    Today's Summary     Continue current medications.  It has been over a year since your stent placement --> you can stop Clopidogrel at this time.  Continue taking Aspirin 81 mg lifelong.  Continue healthy diet and daily exercise as you are able.  Please make sure to schedule the repeat echocardiogram.  This was ordered recently by your Oncologist.  We will review these results once completed.  Follow up in 1-year with Dr. Stoddard.    If you have questions or concerns, please do not hesitate to call my nursing support team at [phone].    Scheduling phone number: [phone]  [facility] Clinic Number: [phone]    It was a pleasure seeing you today.     JUNIOR Alva, CNP  Nurse Practitioner  [facility]  March 21, 2023  ________________________________________________________

## 2023-03-21 NOTE — TELEPHONE ENCOUNTER
FS,  Please see below Emiliana message and advise.    Patient also called -     Wants to know if these will also help with Covid:     Hydroxychloroquine (told him this is only prescribed in hospital)  Azithromycin (told him this will not help with Covid as Covid is a viral)   Vitamin C - 1000mg daily - already on this - wants to know if ok to take 2000mg-5000mg   Zinc - good to take this and then what dose?  Caffeine - see Emiliana    Thanks,  Stacie MAYA RN     NULL Medical Decision Making

## 2023-03-27 ENCOUNTER — ANCILLARY PROCEDURE (OUTPATIENT)
Dept: MRI IMAGING | Facility: CLINIC | Age: 68
End: 2023-03-27
Attending: FAMILY MEDICINE
Payer: COMMERCIAL

## 2023-03-27 DIAGNOSIS — R93.0 ABNORMAL MRI OF HEAD: ICD-10-CM

## 2023-03-27 DIAGNOSIS — R41.3 MEMORY DIFFICULTY: ICD-10-CM

## 2023-03-27 PROCEDURE — 255N000002 HC RX 255 OP 636: Performed by: FAMILY MEDICINE

## 2023-03-27 PROCEDURE — 70553 MRI BRAIN STEM W/O & W/DYE: CPT

## 2023-03-27 PROCEDURE — A9585 GADOBUTROL INJECTION: HCPCS | Performed by: FAMILY MEDICINE

## 2023-03-27 RX ORDER — GADOBUTROL 604.72 MG/ML
10 INJECTION INTRAVENOUS ONCE
Status: COMPLETED | OUTPATIENT
Start: 2023-03-27 | End: 2023-03-27

## 2023-03-27 RX ADMIN — GADOBUTROL 10 ML: 604.72 INJECTION INTRAVENOUS at 12:10

## 2023-03-28 ENCOUNTER — TELEPHONE (OUTPATIENT)
Dept: WOUND CARE | Facility: CLINIC | Age: 68
End: 2023-03-28

## 2023-03-28 ENCOUNTER — MYC MEDICAL ADVICE (OUTPATIENT)
Dept: FAMILY MEDICINE | Facility: CLINIC | Age: 68
End: 2023-03-28

## 2023-03-28 ENCOUNTER — HOSPITAL ENCOUNTER (OUTPATIENT)
Dept: OCCUPATIONAL THERAPY | Facility: CLINIC | Age: 68
Discharge: HOME | End: 2023-03-28
Payer: COMMERCIAL

## 2023-03-28 DIAGNOSIS — I89.0 LYMPHEDEMA: Primary | ICD-10-CM

## 2023-03-28 DIAGNOSIS — I87.2 VENOUS STASIS DERMATITIS OF BOTH LOWER EXTREMITIES: ICD-10-CM

## 2023-03-28 PROCEDURE — 97140 MANUAL THERAPY 1/> REGIONS: CPT | Mod: GO | Performed by: OCCUPATIONAL THERAPIST

## 2023-03-28 NOTE — TELEPHONE ENCOUNTER
Patient did call back to the clinic and is wondering if he should make another appointment with Dr Stanford. He had a recent appointment with another clinic and it is believed that his cellulitis may have come from a crack in a calloused area that was removed. He now has another cracked area and is worried about getting another round of cellulitis. He is asking for a nurse call back.

## 2023-03-28 NOTE — TELEPHONE ENCOUNTER
Voicemail left for patient that prescription was sent on March 16th and that a pharmacy may not dispense as you can purchase OTC. Sated to call clinic back with any further questions.

## 2023-03-28 NOTE — TELEPHONE ENCOUNTER
Patient is requesting a prescription for a cream that is same or similar to Atrac-Tain cream. He is specifically asking that if the prescription is for a substitute, that the cream have 10% urea and 3% aha. He says that he and Dr Stanford discussed a prescription for such a cream at a past appointment, but he had not received one. He would like this to be sent to the [facility] in [place].    [address] [place] [address]  Phone: [phone]

## 2023-03-29 NOTE — TELEPHONE ENCOUNTER
Returned call to patient. He reports he does not have a concern for cellulitis at this time but is concerned about future concerns. Discussed with the patient that he has an appointment 3/31 with vascular medicine and that he will be assessed at that visit. If Dr. Ferrer feels the patient needs to see Dr. Stanford again the patient will call [facility] back and schedule an appointment. No further questions or concerns.

## 2023-03-31 ENCOUNTER — OFFICE VISIT (OUTPATIENT)
Dept: OTHER | Facility: CLINIC | Age: 68
End: 2023-03-31
Attending: FAMILY MEDICINE
Payer: COMMERCIAL

## 2023-03-31 VITALS
WEIGHT: 246.4 LBS | HEART RATE: 92 BPM | DIASTOLIC BLOOD PRESSURE: 117 MMHG | SYSTOLIC BLOOD PRESSURE: 178 MMHG | BODY MASS INDEX: 39.77 KG/M2 | OXYGEN SATURATION: 95 %

## 2023-03-31 DIAGNOSIS — E85.89 OTHER AMYLOIDOSIS (H): ICD-10-CM

## 2023-03-31 DIAGNOSIS — E78.5 HYPERLIPIDEMIA LDL GOAL <70: ICD-10-CM

## 2023-03-31 DIAGNOSIS — I89.0 LYMPHEDEMA OF BOTH LOWER EXTREMITIES: Primary | ICD-10-CM

## 2023-03-31 DIAGNOSIS — G47.33 OSA (OBSTRUCTIVE SLEEP APNEA): ICD-10-CM

## 2023-03-31 DIAGNOSIS — I10 BENIGN ESSENTIAL HYPERTENSION: ICD-10-CM

## 2023-03-31 DIAGNOSIS — E11.59 TYPE 2 DIABETES MELLITUS WITH OTHER CIRCULATORY COMPLICATION, WITHOUT LONG-TERM CURRENT USE OF INSULIN (H): ICD-10-CM

## 2023-03-31 PROCEDURE — G0463 HOSPITAL OUTPT CLINIC VISIT: HCPCS | Performed by: INTERNAL MEDICINE

## 2023-03-31 PROCEDURE — G0463 HOSPITAL OUTPT CLINIC VISIT: HCPCS

## 2023-03-31 PROCEDURE — 99215 OFFICE O/P EST HI 40 MIN: CPT | Performed by: INTERNAL MEDICINE

## 2023-03-31 RX ORDER — LOSARTAN POTASSIUM 100 MG/1
100 TABLET ORAL DAILY
Qty: 90 TABLET | Refills: 1 | Status: SHIPPED | OUTPATIENT
Start: 2023-03-31 | End: 2023-07-05

## 2023-03-31 NOTE — PATIENT INSTRUCTIONS
FOLLOW UP WITH LYMPHEDEMA THERAPIST     EDEMA WEAR    INCREASE LOSARTAN DOSE  MG DAILY AND TAKE IN THE EVENING, NEW RX SENT     SKIN CARE PER DR. VALADEZ    CONTINUE CURRENT SUPPLEMENTS    TIGHT CONTROL OF DIABETES      FOLLOW UP WITH VASQUEZ QUIROZ PA-C IN 2-3 MONTHS AT [facility] [place]

## 2023-03-31 NOTE — PROGRESS NOTES
[facility]        Patient is here for a  follow up.      Pt is currently taking Aspirin and Statin.    BP (!) 178/117 (BP Location: Right arm, Patient Position: Chair, Cuff Size: Adult Large)   Pulse 92   Wt 246 lb 6.4 oz (111.8 kg)   SpO2 95%   BMI 39.77 kg/m      The provider has been notified that the patient has no concerns.     Questions patient would like addressed today are: N/A.    Refills are needed: N/A    Has homecare services and agency name:  Qing Correa MA

## 2023-03-31 NOTE — PROGRESS NOTES
[facility] INITIAL VASCULAR MEDICINE CONSULT      PRIMARY HEALTH CARE PROVIDER:  Sherwin Mejia DO      REFERRING HEALTH CARE PROVIDER;  Sherwin Mcdermott DO      REASON FOR CONSULT: Evaluation and management of lymphedema with history of complex and complicated past medical and past surgical history recently seen and evaluated by Dr. Stanford at the wound care [place] and also seen by certified lymphedema therapist      HPI: Parmjit Hernández is a 67 year old very pleasant male non-smoker with history of type 2 diabetes mellitus poorly controlled, amyloidosis of the stomach seen and evaluated at [facility] underwent stem cell transplant, obesity, hypertension, anxiety and depression, degenerative disc disease recently admitted to the hospital with cellulitis of lower extremity treated with antibiotics cellulitis improved and the help for his lymphedema using the edema wear and also seen by lymphedema therapist and Dr. Stanford recently.  Taking medications as prescribed but still continues to have a high blood pressure today of 178/117 despite multiple medications.  Currently not on any dihydropyridine calcium channel blockers like amlodipine or felodipine or nifedipine etc..  Venous duplex negative for DVT no signs of venous incompetency on his previous studies.  He has a bedside dopplerable pulses and previous ESME normal.    He spent more than 2 years in [place] and recently moved back to [place].  He denies any fever, chills  Blood sugars started improving after recent adjustment of the medications in the hospital        PAST MEDICAL HISTORY  Past Medical History:   Diagnosis Date     Acquired lymphedema 2/4/2019     Allergic state      Amyloidosis (H)     followed by hematology Dr. Romeo status post peripheral stem cell transplant     Anxiety 5/11/2016     Anxiety and depression 12/18/2013     Bipolar I disorder (H) 9/1/2015    Under care of psychiatrist NEISHA-   Lacey doxepin 25 mg, 2 cap bedtime DULoxetine 20 mg once daily  OLANZapine 7.5 mg  1 tab bedtime      DDD (degenerative disc disease), lumbar 8/6/2020     Depressive disorder 1995     EKG abnormality 4/20/2020     H/O bone marrow transplant (H)      Headache(784.0)      History of diverticulitis 1/27/2015    1/15-rxed with antibiotics      Hyperlipidemia LDL goal <100 10/31/2010     Hypertension 2007     Hypertension goal BP (blood pressure) < 140/90 10/11/2011     Marianne (H) 8/20/2015     Morbid obesity (H) 8/28/2018     Myalgia and myositis, unspecified      Narcotic dependence (H) 9/6/2016    None in about 6 months- 8/1/17     NSTEMI (non-ST elevated myocardial infarction) (H) 04/19/2020     OCD (obsessive compulsive disorder)      Other acquired absence of organ 94     Other cirrhosis of liver (H) possibly from vences  4/15/2020     Other specified viral warts      Peripheral edema 5/20/2018    Noncardiac Continue with  furosemide (LASIX) 20 MG tablet,  potassium       chloride SA (K-DUR/KLOR-CON M) 10 MEQ CR  Tab once daily  Basic metabolic panel     Polyneuropathy associated with underlying disease (H) 2/4/2019     Restless legs syndrome (RLS) 6/29/2017     Stasis dermatitis of both legs 12/31/2018     Type 2 diabetes mellitus with other specified complication (H) 7/10/2017       CURRENT MEDICATIONS  acetaminophen (TYLENOL) 500 MG tablet, Take 1,000 mg by mouth every 8 hours as needed  aspirin (ASPIRIN LOW DOSE) 81 MG tablet, Take 1 tablet (81 mg) by mouth daily  atorvastatin (LIPITOR) 20 MG tablet, Take 1.5 tablets (30 mg) by mouth daily  Bioflavonoid Products (CANDIDO-C) TABS, Take 1,000 mg by mouth daily  blood glucose (NO BRAND SPECIFIED) lancets standard, Use to test blood sugar 1 time daily or as directed.  blood glucose (NO BRAND SPECIFIED) test strip, Use to test blood sugar 1 time daily or as directed.  buPROPion (WELLBUTRIN XL) 150 MG 24 hr tablet, Take 150 mg by mouth every morning  cariprazine  (VRAYLAR) 4.5 MG capsule, Take 4.5 mg by mouth daily  DULoxetine (CYMBALTA) 30 MG capsule, Take 30 mg by mouth 2 times daily  Folic Acid-Vit B6-Vit B12 0.5-5-0.2 MG TABS, Take 1 tablet by mouth daily  glucose 40 % (400 mg/mL) gel, Take 15-30 g by mouth every 15 minutes as needed for low blood sugar  hydrochlorothiazide (HYDRODIURIL) 25 MG tablet, Take 1.5 tablets (37.5 mg) by mouth daily  insulin glargine (LANTUS PEN) 100 UNIT/ML pen, Inject 10 Units Subcutaneous At Bedtime  loperamide (IMODIUM) 2 MG capsule, Take 1 capsule (2 mg) by mouth 4 times daily as needed for diarrhea  medication given by implanted intrathecal pump, continuous Drug # 1: Fentanyl (Sublimaze) - Conc: 2000 mcg/mL - Total Dose / 24 hours: 1561.9 mcg  Drug # 2: Bupivacaine (Marcaine)  - Conc: 20 mg/mL - Total Dose / 24 hours: 15.619 mg  Drug # 3: Morphine (Duramorph or Infumorph)  - Conc: 9 mg/mL - Total Dose / 24 hours: 7.0287 mg    Rate: 0.0325 mL/hr  Pump Reservoir Volume: 40 mL  Outside Clinic & Provider: Dr. Pawan Shaw, [facility]  Last Refill Date: 2/22/2023  Next Refill Date: 4/6/2023  Low Cecil-Bishop Alarm Date: 4/4/2023  Pump : Medtronic SynchroMed II    Boluses: Up to 3 per day, 3 minute duration. Lock-out every 6 hours  Fentanyl: 99.9 mcg/dose  Bupivacaine: 0.999 mg/dose  Morphine: 0.4497 mg/dose  metFORMIN (GLUCOPHAGE XR) 500 MG 24 hr tablet, Take 2 tablets (1,000 mg) by mouth 2 times daily (with meals)  metoprolol succinate ER (TOPROL-XL) 25 MG 24 hr tablet, Take 2 tablets (50 mg) by mouth daily  modafinil (PROVIGIL) 200 MG tablet, Take 1.5 tablets (300 mg) by mouth daily  multivitamin w/minerals (THERA-VIT-M) tablet, Take 1 tablet by mouth daily Men's 50+  nitroGLYcerin (NITROSTAT) 0.4 MG sublingual tablet, Place 0.4 mg under the tongue every 5 minutes as needed for chest pain For chest pain place 1 tablet under the tongue every 5 minutes for 3 doses. If symptoms persist 5 minutes after 1st dose call  911.  oxyCODONE-acetaminophen (PERCOCET) 5-325 MG tablet, Take 2 tablets by mouth 2 times daily  pregabalin (LYRICA) 100 MG capsule, Take 100 mg by mouth Take up to 4 times per day  senna-docusate (SENOKOT-S/PERICOLACE) 8.6-50 MG tablet, Take 1-2 tablets by mouth 2 times daily At baseline, Take 1 tablet in the morning and 2 tablets in the evening. May take second tablet in morning if needed.  SUMAtriptan (IMITREX) 50 MG tablet, Take 1 tablet (50 mg) by mouth at onset of headache for migraine May repeat in 2 hours. Max 4 tablets/24 hours.  tamsulosin (FLOMAX) 0.4 MG capsule, Take 1 capsule (0.4 mg) by mouth 2 times daily  testosterone (ANDROGEL/TESTIM) 50 MG/5GM (1%) topical gel, Place 50 mg of testosterone onto the skin daily  vitamin D3 (CHOLECALCIFEROL) 1.25 MG (69104 UT) capsule, Take 1 capsule (50,000 Units) by mouth every 7 days  dulaglutide (TRULICITY) 0.75 MG/0.5ML pen, Inject 0.75 mg Subcutaneous every 7 days (Patient not taking: Reported on 3/21/2023)    No current facility-administered medications on file prior to visit.      PAST SURGICAL HISTORY:  Past Surgical History:   Procedure Laterality Date     ABDOMEN SURGERY  2007    abdominal hernia     BACK SURGERY  8/6/2020     BIOPSY  june 2015    negative     BMT PROTOCOL      for systemic amyloidosis     BONE MARROW BIOPSY, BONE SPECIMEN, NEEDLE/TROCAR N/A 6/8/2016    Procedure: BIOPSY BONE MARROW;  Surgeon: Nathan Agrawal MD;  Location:  GI     BONE MARROW BIOPSY, BONE SPECIMEN, NEEDLE/TROCAR N/A 2/20/2019    Procedure: BIOPSY BONE MARROW;  Surgeon: Michael Raygoza MD;  Location:  GI     CHOLECYSTECTOMY  1995    lap qian     COLONOSCOPY  2012    hx polyps     ESOPHAGOSCOPY, GASTROSCOPY, DUODENOSCOPY (EGD), COMBINED N/A 5/29/2015    Procedure: COMBINED ESOPHAGOSCOPY, GASTROSCOPY, DUODENOSCOPY (EGD), BIOPSY SINGLE OR MULTIPLE;  Surgeon: John Jacob MD;  Location:  GI     HERNIA REPAIR, UMBILICAL  2006     [facility]  NONSPECIFIC PROCEDURE  94    Cholecystectomy     ZC NONSPECIFIC PROCEDURE  2000    repair deviated septum       ALLERGIES     Allergies   Allergen Reactions     Cephalexin Diarrhea     Liraglutide Other (See Comments), Headache and Unknown     Other reaction(s): Headache, Unknown  PN: migraines - Increased migraine frequency and severity       Sulfa Drugs Angioedema and Swelling     Pt has taken  Taken sulfa drugs orally without trouble. He had problems with Sulfa eye drops. Eye swelled up       Victoza      Increased migraine frequency and severity       FAMILY HISTORY  Family History   Problem Relation Age of Onset     Cerebrovascular Disease Mother      C.A.D. Mother      Hypertension Mother      Alcohol/Drug Mother      Arthritis Mother      Cerebrovascular Disease Maternal Grandmother      C.A.D. Maternal Grandmother      Alcohol/Drug Maternal Grandmother      C.A.D. Father      Heart Disease Father      Hypertension Father      Alcohol/Drug Father      Allergies Father      Circulatory Father      Depression Father      Respiratory Father      Asthma Father      Alcohol/Drug Paternal Grandfather      Cerebrovascular Disease Paternal Grandfather      Depression Son      Psychotic Disorder Son         anxiety disorder     Depression Daughter      Cerebrovascular Disease Maternal Grandfather      Cerebrovascular Disease Paternal Grandmother      Diabetes Brother      Allergies Sister      Depression Sister      Gynecology Sister        VASCULAR FAMILY HISTORY  1st order relative with atherosclerotic PAD: No  1st order relative with AAA: No  Family history of Familial Hyperlipidemia No  Family History of Hypercoagulable state:No    VASCULAR RISK FACTORS  1. Diabetes:Yes uncontrolled  2. Smoking: has never smoked.  3. HTN: uncontrolled  4.Hyperlipidemia: Yes - controlled      SOCIAL HISTORY  Social History     Socioeconomic History     Marital status: Single     Spouse name: Not on file     Number of children: 3      Years of education: Not on file     Highest education level: Not on file   Occupational History     Employer: [employer]   Tobacco Use     Smoking status: Never     Smokeless tobacco: Never   Vaping Use     Vaping Use: Never used   Substance and Sexual Activity     Alcohol use: Not Currently     Alcohol/week: 0.0 standard drinks     Drug use: Not Currently     Types: Cocaine, Marijuana, Methamphetamines, Opiates, IV, Amphetamines, Barbiturates, Benzodiazepines, Codeine, Fentanyl, Hashish, Hydrocodone, Hydromorphone, LSD, Oxycodone     Sexual activity: Not Currently     Partners: Female     Birth control/protection: Abstinence   Other Topics Concern     Parent/sibling w/ CABG, MI or angioplasty before 65F 55M? No   Social History Narrative    Social Documentation:05/27/2010        Balanced Diet: NO    Calcium intake: 3-4 per day    Caffeine: 2 per day    Exercise:  type of activity NO    Sunscreen: Yes    Seatbelts:  Yes    Self Breast Exam:  No - na    Self Testicular Exam: no    Physical/Emotional/Sexual Abuse: No     Do you feel safe in your environment? Yes        Cholesterol screen up to date: Yes    Eye Exam up to date: Yes    Dental Exam up to date: Yes    Pap smear up to date: Does Not Apply    Mammogram up to date: Does Not Apply    Dexa Scan up to date:NO    Colonoscopy up to date:2007    Immunizations up to date: YES    Glucose screen if over 40: Yes        Sofi Rosas CMA                 Social Determinants of Health     Financial Resource Strain: Not on file   Food Insecurity: Not on file   Transportation Needs: Not on file   Physical Activity: Not on file   Stress: Not on file   Social Connections: Not on file   Intimate Partner Violence: Not At Risk     Fear of Current or Ex-Partner: No     Emotionally Abused: No     Physically Abused: No     Sexually Abused: No   Housing Stability: Not on file       ROS:   General: No change in weight, sleep or appetite.  Normal energy.  No fever or  chills  Eyes: Negative for vision changes or eye problems  ENT: No problems with ears, nose or throat.  No difficulty swallowing.  Resp: No coughing, wheezing or shortness of breath  CV: No chest pains or palpitations  GI: No nausea, vomiting,  heartburn, abdominal pain, diarrhea, constipation or change in bowel habits  : No urinary frequency or dysuria, bladder or kidney problems  Musculoskeletal: History of chronic back issues with DJD of lumbar spine  Neurologic: No headaches, numbness, tingling, weakness, problems with balance or coordination  Psychiatric: No problems with anxiety, depression or mental health  Heme/immune/allergy: No history of bleeding or clotting problems or anemia.  No allergies or immune system problems  Endocrine: No history of thyroid disease, diabetes or other endocrine disorders  Skin: Recent cellulitis treated with antibiotics and local care improved  Vascular: Known history of bilateral lower extremity lymphedema and recent history of cellulitis resolved after antibiotics    EXAM:  BP (!) 178/117 (BP Location: Right arm, Patient Position: Chair, Cuff Size: Adult Large)   Pulse 92   Wt 246 lb 6.4 oz (111.8 kg)   SpO2 95%   BMI 39.77 kg/m    In general, the patient is a pleasant male in no apparent distress.    HEENT: NC/AT.  PERRLA.  EOMI.  Sclerae white, not injected.  Nares clear.  Pharynx without erythema or exudate.  Dentition intact.    Neck: No adenopathy.  No thyromegaly. Carotids +2/2 bilaterally without bruits.  No jugular venous distension.   Heart: RRR. Normal S1, S2 splits physiologically. No murmur, rub, click, or gallop. The PMI is in the 5th ICS in the midclavicular line. There is no heave.    Lungs: CTA.  No ronchi, wheezes, rales.  No dullness to percussion.   Abdomen: Soft, nontender, nondistended. No organomegaly. No AAA.  No bruits.   Extremities: Vascular:  Bilateral lower extremity classical lymphedema with improved recent ulceration and cellulitis  Improving  callus after recent debridement and local care by Dr. Stanford  Does have a thickened bilateral toenails  Excellent palpable peripheral pulses and dopplerable DP and PT pulses    Labs:  LIPID RESULTS:  Lab Results   Component Value Date    CHOL 153 02/23/2023    CHOL 107 02/16/2021    HDL 35 (L) 02/23/2023    HDL 22 (L) 02/16/2021    LDL 79 02/23/2023    LDL 29 02/16/2021    TRIG 197 (H) 02/23/2023    TRIG 164 (H) 07/12/2021    TRIG 279 (H) 02/16/2021    CHOLHDLRATIO 5.9 (H) 09/01/2015       LIVER ENZYME RESULTS:  Lab Results   Component Value Date    AST 38 03/17/2023    AST 17 06/01/2021    ALT 28 03/17/2023    ALT 31 06/01/2021       CBC RESULTS:  Lab Results   Component Value Date    WBC 9.0 03/17/2023    WBC 5.5 06/01/2021    RBC 4.13 (L) 03/17/2023    RBC 4.29 (L) 06/01/2021    HGB 13.3 03/17/2023    HGB 13.5 06/01/2021    HCT 38.3 (L) 03/17/2023    HCT 39.1 (L) 06/01/2021    MCV 93 03/17/2023    MCV 91 06/01/2021    MCH 32.2 03/17/2023    MCH 31.5 06/01/2021    MCHC 34.7 03/17/2023    MCHC 34.5 06/01/2021    RDW 13.5 03/17/2023    RDW 12.9 06/01/2021     03/17/2023     (L) 06/01/2021       BMP RESULTS:  Lab Results   Component Value Date     (L) 03/17/2023     06/01/2021    POTASSIUM 4.6 03/17/2023    POTASSIUM 3.9 06/01/2021    CHLORIDE 100 03/17/2023    CHLORIDE 102 06/01/2021    CO2 25 03/17/2023    CO2 26 06/01/2021    ANIONGAP 10 03/17/2023    ANIONGAP 8 06/01/2021     (H) 03/17/2023     (H) 03/15/2023     (H) 06/01/2021    BUN 16.6 03/17/2023    BUN 19 06/01/2021    CR 0.85 03/17/2023    CR 0.88 06/01/2021    GFRESTIMATED >90 03/17/2023    GFRESTIMATED >90 06/01/2021    GFRESTBLACK >90 06/01/2021    LUIS 9.3 03/17/2023    LUIS 8.3 (L) 06/01/2021        A1C RESULTS:  Lab Results   Component Value Date    A1C 9.3 (H) 02/23/2023    A1C 5.8 (H) 05/07/2021       THYROID RESULTS:  Lab Results   Component Value Date    TSH 3.04 12/05/2020           Assessment and  Plan:    1. Lymphedema of both lower extremities ( phlebolymphedema)     2. Benign essential hypertension    - losartan (COZAAR) 100 MG tablet; Take 1 tablet (100 mg) by mouth daily  Dispense: 90 tablet; Refill: 1    3. Type 2 diabetes mellitus with other circulatory complication, without long-term current use of insulin (H)    4. NORMA (obstructive sleep apnea)    5.  Amyloidosis of stomach  (H) UNDERWENT CHEMO AND STEM SELL THERAPY AT [place]     6. Hyperlipidemia LDL goal <70      This is a very pleasant 67-year-old male never smoked with known history of lymphedema tarda, poorly controlled type 2 diabetes mellitus, poorly controlled hypertension morbidly obese BMI close to 40 and also obstructive sleep apnea recently admitted to the hospital with cellulitis after antibiotics and local care and wound care evaluation treatment by Dr. Stanford improving and also was seen and evaluated by lymphedema therapist leg swelling significantly improved compared to the previous pictures.  Cellulitis also significantly improved and almost resolved.  He is taking supplements and treatment plan given by Dr. Stanford  Using edema wear  Elevating the legs  Per patient his sugars also started improving, he is scheduled to see primary care physician this week  Blood pressure is very high reviewed meds he is only taking low-dose losartan 50 daily    At present my recommendations  Continue local leg care foot care and edema care instructions given by Dr. Stanford  Follow-up with lymphedema therapist  Elevate the legs  Tight control of the diabetes  Take supplements as suggested  Increase losartan dose to 100 mg daily new prescription sent and he was advised to take current supply of 50 mg tablets 2 of them and monitor blood pressure at home  Follow-up with Anjali Elizabeth PA-C in 2 to 3 months for possible lymphedema pump        60 minutes spent on the date of the encounter doing chart review, history and exam, documentation, and further activities  as noted above.  This note was dictated by lysing Dragon software    Copy of this note to primary care physician and Dr. Stanford and Anjali Ferrer MD, FAKARTHIK, FSVM, FNLA,FACP  Vascular medicine  Clinical hypertension specialist  Clinical lipidologist

## 2023-03-31 NOTE — TELEPHONE ENCOUNTER
DE,      Please see [facility] message.    Patient informed you are out of the office until Monday 4/3      Thank you,  Wandy Pena RN

## 2023-04-03 ENCOUNTER — TELEPHONE (OUTPATIENT)
Dept: OTHER | Facility: CLINIC | Age: 68
End: 2023-04-03
Payer: COMMERCIAL

## 2023-04-03 NOTE — TELEPHONE ENCOUNTER
Follow-up to 3/31/23 with Dr. Ferrer  For Lymphedema        In clinic visit with Anjali Elizabeth PA-C in 2-3 months.  No labs.

## 2023-04-05 NOTE — TELEPHONE ENCOUNTER
Future Appointments   Date Time Provider Department Center   6/5/2023  2:20 PM Anjali Elizabeth PA-C [facility]

## 2023-04-11 NOTE — TELEPHONE ENCOUNTER
RECORDS RECEIVED FROM: Internal   REASON FOR VISIT: Memory difficulty    Date of Appt: 04/14/2023   NOTES (FOR ALL VISITS) STATUS DETAILS   OFFICE NOTE from referring provider Internal 03/03/2023 Dr Radha Mcdermott [facility]    OFFICE NOTE from other specialist N/A    DISCHARGE SUMMARY from hospital N/A    DISCHARGE REPORT from the ER N/A    OPERATIVE REPORT N/A    AWAIS Virus Labs (MS ONLY) N/A    EMG N/A    EEG N/A    MEDICATION LIST N/A    IMAGING  (FOR ALL VISITS)     LUMBAR PUNCTURE N/A    GURPREET SCAN N/A    ULTRASOUND (CAROTID BILAT) *VASCULAR* N/A    MRI (HEAD, NECK, SPINE) Internal 03/27/2023 brain  11/16/2020 brain    CT (HEAD, NECK, SPINE) N/A

## 2023-04-14 ENCOUNTER — PRE VISIT (OUTPATIENT)
Dept: NEUROLOGY | Facility: CLINIC | Age: 68
End: 2023-04-14

## 2023-04-17 ENCOUNTER — OFFICE VISIT (OUTPATIENT)
Dept: FAMILY MEDICINE | Facility: CLINIC | Age: 68
End: 2023-04-17
Payer: COMMERCIAL

## 2023-04-17 VITALS
HEIGHT: 66 IN | TEMPERATURE: 97.1 F | SYSTOLIC BLOOD PRESSURE: 136 MMHG | BODY MASS INDEX: 38.84 KG/M2 | RESPIRATION RATE: 16 BRPM | WEIGHT: 241.7 LBS | DIASTOLIC BLOOD PRESSURE: 86 MMHG | OXYGEN SATURATION: 96 % | HEART RATE: 75 BPM

## 2023-04-17 DIAGNOSIS — E66.01 MORBID OBESITY (H): ICD-10-CM

## 2023-04-17 DIAGNOSIS — G89.4 CHRONIC PAIN SYNDROME: ICD-10-CM

## 2023-04-17 DIAGNOSIS — I10 HYPERTENSION GOAL BP (BLOOD PRESSURE) < 140/90: ICD-10-CM

## 2023-04-17 DIAGNOSIS — I73.9 PERIPHERAL VASCULAR DISEASE (H): ICD-10-CM

## 2023-04-17 DIAGNOSIS — G62.9 PERIPHERAL POLYNEUROPATHY: ICD-10-CM

## 2023-04-17 DIAGNOSIS — E11.69 TYPE 2 DIABETES MELLITUS WITH OTHER SPECIFIED COMPLICATION, WITHOUT LONG-TERM CURRENT USE OF INSULIN (H): ICD-10-CM

## 2023-04-17 DIAGNOSIS — I87.2 VENOUS STASIS DERMATITIS OF BOTH LOWER EXTREMITIES: ICD-10-CM

## 2023-04-17 DIAGNOSIS — L03.115 CELLULITIS OF RIGHT LOWER EXTREMITY: Primary | ICD-10-CM

## 2023-04-17 LAB
ANION GAP SERPL CALCULATED.3IONS-SCNC: 14 MMOL/L (ref 7–15)
BUN SERPL-MCNC: 15.6 MG/DL (ref 8–23)
CALCIUM SERPL-MCNC: 10 MG/DL (ref 8.8–10.2)
CHLORIDE SERPL-SCNC: 96 MMOL/L (ref 98–107)
CREAT SERPL-MCNC: 0.79 MG/DL (ref 0.67–1.17)
CREAT UR-MCNC: 79 MG/DL
DEPRECATED HCO3 PLAS-SCNC: 24 MMOL/L (ref 22–29)
ERYTHROCYTE [DISTWIDTH] IN BLOOD BY AUTOMATED COUNT: 14.4 % (ref 10–15)
GFR SERPL CREATININE-BSD FRML MDRD: >90 ML/MIN/1.73M2
GLUCOSE SERPL-MCNC: 196 MG/DL (ref 70–99)
HCT VFR BLD AUTO: 43.2 % (ref 40–53)
HGB BLD-MCNC: 15.6 G/DL (ref 13.3–17.7)
MCH RBC QN AUTO: 33.4 PG (ref 26.5–33)
MCHC RBC AUTO-ENTMCNC: 36.1 G/DL (ref 31.5–36.5)
MCV RBC AUTO: 93 FL (ref 78–100)
PLATELET # BLD AUTO: 192 10E3/UL (ref 150–450)
POTASSIUM SERPL-SCNC: 4.1 MMOL/L (ref 3.4–5.3)
RBC # BLD AUTO: 4.67 10E6/UL (ref 4.4–5.9)
SODIUM SERPL-SCNC: 134 MMOL/L (ref 136–145)
WBC # BLD AUTO: 10.6 10E3/UL (ref 4–11)

## 2023-04-17 PROCEDURE — 80048 BASIC METABOLIC PNL TOTAL CA: CPT | Performed by: FAMILY MEDICINE

## 2023-04-17 PROCEDURE — 99207 PR FOOT EXAM NO CHARGE: CPT | Performed by: FAMILY MEDICINE

## 2023-04-17 PROCEDURE — 85027 COMPLETE CBC AUTOMATED: CPT | Performed by: FAMILY MEDICINE

## 2023-04-17 PROCEDURE — 91312 COVID-19 VACCINE BIVALENT BOOSTER 12+ (PFIZER): CPT | Performed by: FAMILY MEDICINE

## 2023-04-17 PROCEDURE — 2894A URINE DRUG CONFIRMATION PANEL: CPT | Performed by: FAMILY MEDICINE

## 2023-04-17 PROCEDURE — 99215 OFFICE O/P EST HI 40 MIN: CPT | Mod: 25 | Performed by: FAMILY MEDICINE

## 2023-04-17 PROCEDURE — G0480 DRUG TEST DEF 1-7 CLASSES: HCPCS | Performed by: FAMILY MEDICINE

## 2023-04-17 PROCEDURE — 0124A COVID-19 VACCINE BIVALENT BOOSTER 12+ (PFIZER): CPT | Performed by: FAMILY MEDICINE

## 2023-04-17 PROCEDURE — 36415 COLL VENOUS BLD VENIPUNCTURE: CPT | Performed by: FAMILY MEDICINE

## 2023-04-17 ASSESSMENT — PATIENT HEALTH QUESTIONNAIRE - PHQ9
10. IF YOU CHECKED OFF ANY PROBLEMS, HOW DIFFICULT HAVE THESE PROBLEMS MADE IT FOR YOU TO DO YOUR WORK, TAKE CARE OF THINGS AT HOME, OR GET ALONG WITH OTHER PEOPLE: NOT DIFFICULT AT ALL
SUM OF ALL RESPONSES TO PHQ QUESTIONS 1-9: 4
SUM OF ALL RESPONSES TO PHQ QUESTIONS 1-9: 4

## 2023-04-17 ASSESSMENT — ANXIETY QUESTIONNAIRES
6. BECOMING EASILY ANNOYED OR IRRITABLE: NOT AT ALL
5. BEING SO RESTLESS THAT IT IS HARD TO SIT STILL: NOT AT ALL
2. NOT BEING ABLE TO STOP OR CONTROL WORRYING: NOT AT ALL
GAD7 TOTAL SCORE: 0
1. FEELING NERVOUS, ANXIOUS, OR ON EDGE: NOT AT ALL
GAD7 TOTAL SCORE: 0
7. FEELING AFRAID AS IF SOMETHING AWFUL MIGHT HAPPEN: NOT AT ALL
7. FEELING AFRAID AS IF SOMETHING AWFUL MIGHT HAPPEN: NOT AT ALL
3. WORRYING TOO MUCH ABOUT DIFFERENT THINGS: NOT AT ALL
GAD7 TOTAL SCORE: 0
4. TROUBLE RELAXING: NOT AT ALL

## 2023-04-17 ASSESSMENT — PAIN SCALES - GENERAL: PAINLEVEL: MODERATE PAIN (5)

## 2023-04-17 NOTE — PROGRESS NOTES
Assessment & Plan     Cellulitis of right lower extremity  Symptoms have improved since being hospitalized for this last month.  We are going to repeat Rojas a CBC and BMP today.  - CBC with platelets; Future  - Basic metabolic panel  (Ca, Cl, CO2, Creat, Gluc, K, Na, BUN); Future  - Basic metabolic panel  (Ca, Cl, CO2, Creat, Gluc, K, Na, BUN)  - CBC with platelets    Peripheral vascular disease (H)  He will continue to follow closely with his vascular specialists and lymphedema specialist    Venous stasis dermatitis of both lower extremities  He will continue to follow closely with his vascular specialists and the lymphedema specialist.    Type 2 diabetes mellitus with other specified complication, without long-term current use of insulin (H)  Current medications, Lantus, metformin and Trulicity.  I recommended he return to the clinic next month so that we can recheck the A1c in addition to other labs.  - FOOT EXAM    Hypertension goal BP (blood pressure) < 140/90  Blood pressure is looking better on the higher dose of losartan.  He will continue the hydrochlorothiazide and metoprolol.    Peripheral polyneuropathy  We will continue to work on getting the blood glucose numbers under better control which should help with this.    Morbid obesity (H)  He will continue to work on lifestyle modifications to help with weight loss.    Chronic pain syndrome  - [id number] - Urine Drug Confirmation Panel (Comprehensive); Future  - [id number] - Urine Drug Confirmation Panel (Comprehensive)      45 minutes spent by me on the date of the encounter doing chart review, review of test results, patient visit and documentation      MED REC REQUIRED  Post Medication Reconciliation Status:  Discharge medications reconciled, continue medications without change        Sherwin Mcdermott,   [facility]    Lavinia Almendarez is a 67 year old, presenting for the following health issues:  Hospital F/U         View : No  data to display.              [facility]       Hospital Follow-up Visit:    Hospital/Nursing Home/IP Rehab Facility: [facility]  Date of Admission: 3/10/23  Date of Discharge: 3/15/23  Reason(s) for Admission: cellulitis of R LE, lymphedema    Was your hospitalization related to COVID-19? No   Problems taking medications regularly:  None  Medication changes since discharge: advised to stop Trulicity  Problems adhering to non-medication therapy:  None    Summary of hospitalization:  [facility] discharge summary reviewed  Diagnostic Tests/Treatments reviewed.  Follow up needed: Continue to follow-up with wound care/ specialist  Other Healthcare Providers Involved in Patient s Care:         Wound care/vascular specialist  Update since discharge: improved.         Plan of care communicated with patient                 Symptoms have improved since being hospitalized for right lower extremity cellulitis last month.  Going to the wound clinic and he has been following with the vascular specialist.  He is using leg wraps to help with the swelling.  During the hospitalization he sustained a fall and injured his left shoulder.  He reports seeing an orthopedic specialist as an outpatient at [facility] and was told that he has a biceps tendon rupture that may need surgery, but he needs to get his hemoglobin A1c under better control before they will consider doing it.      He has a complex medical history significant for poorly controlled type 2 diabetes, peripheral neuropathy, peripheral vascular disease, coronary artery disease, history of NSTEMI with stent placement, amyloidosis, history of peripheral stem cell transplant, thrombocytopenia, obesity, fatty cirrhosis of the liver, bipolar disorder, migraine headaches, chronic pain syndrome, hypertension, hyperlipidemia and obstructive sleep apnea.  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     On 2/23/2023 his hemoglobin A1c was 9.3% and the CMP results showed an elevated glucose of 305, ALT 51, creatinine 0.83 and sodium 135.  He is currently on metformin, Trulicity and Lantus and he feels like blood glucose numbers are coming down well.  It is too soon to recheck the A1c.    On 3/21/2023 he saw cardiology and they cleared him to stop Plavix and continue all of his other cardiac medications.  They recommended getting the repeat echocardiogram and keep following with his other specialists.    On 3/31/2023 he was seen by the vascular specialist and they recommended he continue leg/foot care and edema care.  They recommended increasing the losartan dose to 100 mg daily since his blood pressure was high.    Overall, he reports feeling much better since being in the hospital.  The swelling and pain is down in his legs.  He feels like blood pressure and blood sugars are under better control as well.  He is feeling more energized.  He denies having chest pains or shortness of breath symptoms.    Review of Systems   Constitutional, HEENT, cardiovascular, pulmonary, GI, , musculoskeletal, neuro, skin, endocrine and psych systems are negative, except as otherwise noted.      Objective    /86   Pulse 75   Temp 97.1  F (36.2  C) (Temporal)   Resp 16   Ht 1.676 m (5' 6\")   Wt 109.6 kg (241 lb 11.2 oz)   SpO2 96%   BMI 39.01 kg/m    Body mass index is 39.01 kg/m .  Physical Exam   GENERAL: healthy, alert and no distress  EYES: Eyes grossly normal to inspection, PERRL and conjunctivae and sclerae normal  HENT:  nose and mouth without ulcers or lesions  NECK: no adenopathy, no asymmetry, masses, or scars and thyroid normal to palpation  RESP: lungs clear to auscultation - no rales, rhonchi or wheezes  CV: regular rate and rhythm, normal S1 S2, no S3 or S4, no murmur, click or rub, 1+ lower extremity edema with chronic venous stasis changes and peripheral pulses " strong  ABDOMEN: soft, nontender, no hepatosplenomegaly, no masses and bowel sounds normal  MS: no gross musculoskeletal defects noted, no edema  SKIN: no suspicious lesions.  There is some chronic venous stasis changes in both legs/feet.  NEURO: Normal strength and tone, mentation intact and speech normal.  Monofilament foot exam reveals slightly decreased sensation in the toes.  There are some callus formation in both feet as well.  PSYCH: mentation appears normal, affect normal/bright                    Answers for HPI/ROS submitted by the patient on 4/17/2023  If you checked off any problems, how difficult have these problems made it for you to do your work, take care of things at home, or get along with other people?: Not difficult at all  PHQ9 TOTAL SCORE: 4  NANCY 7 TOTAL SCORE: 0

## 2023-04-19 LAB
FENTANYL UR CFM-MCNC: 19 NG/ML
FENTANYL/CREAT UR: 24 NG/MG {CREAT}
MORPHINE UR CFM-MCNC: 2520 NG/ML
MORPHINE/CREAT UR: 3190 NG/MG {CREAT}
NORFENTANYL UR CFM-MCNC: 160 NG/ML
NORFENTANYL/CREAT UR: 203 NG/MG {CREAT}
OXYCODONE UR CFM-MCNC: 457 NG/ML
OXYCODONE/CREAT UR: 578 NG/MG {CREAT}
OXYMORPHONE UR CFM-MCNC: 261 NG/ML
OXYMORPHONE/CREAT UR: 330 NG/MG {CREAT}
PREGABALIN UR QL CFM: PRESENT

## 2023-04-20 ENCOUNTER — THERAPY VISIT (OUTPATIENT)
Dept: PHYSICAL THERAPY | Facility: CLINIC | Age: 68
End: 2023-04-20
Attending: INTERNAL MEDICINE
Payer: COMMERCIAL

## 2023-04-20 DIAGNOSIS — S46.212A TEAR OF LEFT BICEPS MUSCLE, INITIAL ENCOUNTER: ICD-10-CM

## 2023-04-20 DIAGNOSIS — M25.512 ACUTE PAIN OF LEFT SHOULDER: ICD-10-CM

## 2023-04-20 PROCEDURE — 97161 PT EVAL LOW COMPLEX 20 MIN: CPT | Mod: GP | Performed by: PHYSICAL THERAPIST

## 2023-04-20 PROCEDURE — 97110 THERAPEUTIC EXERCISES: CPT | Mod: GP | Performed by: PHYSICAL THERAPIST

## 2023-04-20 NOTE — PROGRESS NOTES
[facility]    OUTPATIENT Physical Therapy ORTHOPEDIC EVALUATION  PLAN OF TREATMENT FOR OUTPATIENT REHABILITATION  (COMPLETE FOR INITIAL CLAIMS ONLY)  Patient's Last Name, First Name, M.I.  YOB: 1955  Parmjit Henrández    Provider s Name:  [facility]   Medical Record No.  [id number]   Start of Care Date:  04/20/23   Onset Date:   10/19/22   Treatment Diagnosis:  L shoulder pain following fall, biceps rupture, hx of proximal humerus fracture Medical Diagnosis:     Acute pain of left shoulder  Tear of left biceps muscle, initial encounter       Goals:     04/20/23 0500   Body Part   Goals listed below are for L shoulder   Goal #1   Goal #1 reaching   Previous Functional Level No restrictions   Current Functional Level Cannot reach ;overhead   Performance level 7/10 pain   STG Target Performance Reach ;behind back;overhead   Performance level 3-4/10 pain   Rationale for independent personal hygiene;for dressing;for bathing;for meal preparation;for safe driving, hook seat belt, reach shift lever and turn signal, using both hands on steering wheel   Due date 05/20/23   LTG Target Performance Reach;behind back;overhead   Performance Level 0-1/10 pain   Rationale for independent personal hygiene;for dressing;for bathing;for meal preparation;for safe driving, hook seat belt, reach shift lever and turn signal, using both hands on steering wheel   Due date 08/20/23         Therapy Frequency:  1x/wk  Predicted Duration of Therapy Intervention:  12 wks    Hernandez Salguero, PT                 I CERTIFY THE NEED FOR THESE SERVICES FURNISHED UNDER        THIS PLAN OF TREATMENT AND WHILE UNDER MY CARE     (Physician co-signature of this document indicates review and certification of the therapy plan).                     Certification Date From:  04/20/23   Certification Date To:   07/18/23    Referring Provider:  Dashawn Franklin    Initial Assessment        See Epic Evaluation SOC Date: 04/20/23

## 2023-04-20 NOTE — PROGRESS NOTES
Physical Therapy Initial Evaluation  Subjective:  The history is provided by the patient. No  was used.   Therapist Generated HPI Evaluation  Problem details: On 10/19/22 was in a hotel and caught his toe on a rug and fell onto his L side. Suffered fracture of proximal humerus. Was put in sling at ER referred to ortho MD. Did some PT last fall and reports that his shoulder wasn't really advancing, they were worried about frozen shoulder. More recently when in hospital early March 2023 for lower leg cellulitis, fell asleep on the toilet and fell to the left and supported himself with L hand. CT scan showed the below and pt was told by MD that he ruptured his L biceps:    Findings favored to reflect a subacute impacted fracture of the  humeral neck with probable extension to the greater tuberosity with  adjacent callus formation. .         Type of problem:  Left shoulder.    This is a new condition.  Condition occurred with:  A fall.  Where condition occurred: other.  Patient reports pain:  Posterior, upper arm and upper trap.  Pain is described as sharp and is constant.  Pain is the same all the time.  Since onset symptoms are gradually worsening.  Associated symptoms:  Loss of motion/stiffness, loss of strength and edema. Symptoms are exacerbated by lifting, certain positions, using arm at shoulder level and carrying  and relieved by nothing.  Special tests included:  CT scan.    Restrictions due to condition include:  Working in normal job without restrictions.  Barriers include:  None as reported by patient.    Patient Health History         Pain is reported as 7/10 on pain scale.  General health as reported by patient is poor.  Pertinent medical history includes: diabetes, fibromyalgia, heart problems, high blood pressure, history of fractures, implanted device, incontinence, overweight and sleep disorder/apnea.   Red flags:  Progressive neurological deficits, significant weakness and severe  headaches.  Medical allergies: none.   Surgeries include:  Orthopedic surgery and other. Other surgery history details: lumbar decompression 2000, amyloiosis (stem cell replacement).    Current medications:  High blood pressure medication and pain medication.    Current occupation is Business .   Primary job tasks include:  Prolonged sitting.                                    Objective:  Standing Alignment:    Cervical/Thoracic:  Forward head and thoracic kyphosis increased  Shoulder/UE:  Rounded shoulders                                       Shoulder Evaluation:  ROM:  AROM:    Flexion:  Left:  98    Right:  160    Abduction:  Left: 76   Right:  142                Flexion/External Rotation:  Left:  Cannot toouc L shoulder    Right:  T2  Extension/Internal Rotation:  Left:  L buttock    Right:  T11    PROM:              External Rotation:  Left:  60    Right:  92                    Strength:  not assessed                        Special Tests:  Special tests assessed shoulder: Ahmet deformity on L.        Mobility Tests:    Glenohumeral anterior left:  Hypomobile    Glenohumeral posterior left:  Hypomobile    Glenohumeral inferior left:  Hypomobile                                             General     ROS    Assessment/Plan:    Patient is a 67 year old male with left side shoulder complaints.    Patient has the following significant findings with corresponding treatment plan.                Diagnosis 1:  L shoulder pain following fall with biceps rupture, hx of proximal humerus fracture in Oct 2022  Pain -  manual therapy, self management, education and home program  Decreased ROM/flexibility - manual therapy, therapeutic exercise, therapeutic activity and home program  Decreased joint mobility - manual therapy, therapeutic exercise, therapeutic activity and home program  Decreased strength - therapeutic exercise, therapeutic activities and home program  Inflammation - self management/home program  Decreased  function - therapeutic activities and home program  Impaired posture - neuro re-education, therapeutic activities and home program    1) Decision-Making    Low complexity using standardized patient assessment instrument and/or measureable assessment of functional outcome.  Cumulative Therapy Evaluation is: Low complexity.    Previous and current functional limitations:  (See Goal Flow Sheet for this information)    Short term and Long term goals: (See Goal Flow Sheet for this information)     Communication ability:  Patient appears to be able to clearly communicate and understand verbal and written communication and follow directions correctly.  Treatment Explanation - The following has been discussed with the patient:   RX ordered/plan of care  Anticipated outcomes  Possible risks and side effects  This patient would benefit from PT intervention to resume normal activities.   Rehab potential is excellent.    Frequency:  1 X week, once daily  Duration:  for 12 weeks  Discharge Plan:  Achieve all LTG.  Independent in home treatment program.  Reach maximal therapeutic benefit.    Please refer to the daily flowsheet for treatment today, total treatment time and time spent performing 1:1 timed codes.

## 2023-05-01 ENCOUNTER — TRANSFERRED RECORDS (OUTPATIENT)
Dept: HEALTH INFORMATION MANAGEMENT | Facility: CLINIC | Age: 68
End: 2023-05-01
Payer: COMMERCIAL

## 2023-05-02 ENCOUNTER — TRANSFERRED RECORDS (OUTPATIENT)
Dept: HEALTH INFORMATION MANAGEMENT | Facility: CLINIC | Age: 68
End: 2023-05-02
Payer: COMMERCIAL

## 2023-05-10 ENCOUNTER — HOSPITAL ENCOUNTER (OUTPATIENT)
Dept: WOUND CARE | Facility: CLINIC | Age: 68
Discharge: HOME | DRG: 603 | End: 2023-05-10
Attending: SURGERY | Admitting: SURGERY
Payer: COMMERCIAL

## 2023-05-10 VITALS — HEART RATE: 94 BPM | TEMPERATURE: 97 F | SYSTOLIC BLOOD PRESSURE: 214 MMHG | DIASTOLIC BLOOD PRESSURE: 107 MMHG

## 2023-05-10 DIAGNOSIS — L97.922 CHRONIC ULCER OF LEFT LEG WITH FAT LAYER EXPOSED (H): ICD-10-CM

## 2023-05-10 DIAGNOSIS — L97.912: ICD-10-CM

## 2023-05-10 PROCEDURE — 97597 DBRDMT OPN WND 1ST 20 CM/<: CPT | Performed by: SURGERY

## 2023-05-10 PROCEDURE — 99214 OFFICE O/P EST MOD 30 MIN: CPT | Mod: 25 | Performed by: SURGERY

## 2023-05-10 PROCEDURE — 97602 WOUND(S) CARE NON-SELECTIVE: CPT

## 2023-05-10 RX ORDER — DOXYCYCLINE 100 MG/1
100 CAPSULE ORAL 2 TIMES DAILY
Qty: 60 CAPSULE | Refills: 0 | Status: SHIPPED | OUTPATIENT
Start: 2023-05-10 | End: 2023-06-09

## 2023-05-10 RX ORDER — CEPHALEXIN 500 MG/1
500 CAPSULE ORAL 4 TIMES DAILY
Qty: 56 CAPSULE | Refills: 0 | Status: ON HOLD | OUTPATIENT
Start: 2023-05-10 | End: 2023-05-14

## 2023-05-10 RX ORDER — AMMONIUM LACTATE 12 G/100G
CREAM TOPICAL DAILY
Qty: 385 G | Refills: 1 | Status: SHIPPED | OUTPATIENT
Start: 2023-05-10 | End: 2023-12-24

## 2023-05-10 RX ORDER — METRONIDAZOLE 500 MG/1
TABLET ORAL
Qty: 60 TABLET | Refills: 0 | Status: SHIPPED | OUTPATIENT
Start: 2023-05-10 | End: 2023-11-02

## 2023-05-10 NOTE — PROGRESS NOTES
[facility] [place] Progress Note    Subject: Parmjit Hernández bilateral lower extremity cellulitis, uncontrolled lymphedema, lower extremity ulcerations, has burning sensation associated with the cellulitis, adult-onset diabetes, bipolar disorder.    Patient Active Problem List   Diagnosis     Low back pain     Hyperlipidemia     Hypertension goal BP (blood pressure) < 140/90     Type 2 diabetes mellitus (H)     Advanced directives, counseling/discussion     Migraine headache     History of diverticulitis     MENTAL HEALTH     Bipolar II disorder (H)     Generalized abdominal pain     Light chain (AL) amyloidosis (H)     Insomnia due to medical condition     Obstructive sleep apnea syndrome     Restless legs syndrome (RLS)     Leg edema     Morbid obesity (H)     Venous stasis dermatitis of both lower extremities     Polyneuropathy, unspecified     Acquired lymphedema     Low blood pressure reading     History of peripheral stem cell transplant (H)     Other specified anxiety disorders     Abnormal electrocardiography     Bilateral leg edema     Onychomycosis     Thrombocytopenia, unspecified (H)     Muscle weakness (generalized)     Bilateral leg weakness     NSTEMI (non-ST elevated myocardial infarction) (H)     Hyperkalemia     Essential hypertension     Shortness of breath     Chest pain     Peripheral vascular disease (H)     Other cirrhosis of liver (H)     Lymphedema     Cellulitis of right lower extremity     Right upper quadrant pain     Abnormal computed tomography scan     Acquired pancytopenia (H)     Acute postoperative pain     Benign neoplasm of cecum     Benign neoplasm of transverse colon     Benign prostatic hyperplasia     Bilateral cataracts     Bipolar 1 disorder, manic, mild (H)     Cannabis dependence in remission (H)     Cardiovascular stress test abnormal     Chronic fatigue, unspecified     Continuous opioid dependence (H)     Contusion of rib     Coronary arteriosclerosis      DDD (degenerative disc disease), lumbar     Decreased testosterone level     Depressed bipolar I disorder in full remission (H)     Chronic diarrhea     Hernia of anterior abdominal wall     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)     Disorder of refraction     Diverticular disease of colon     Drug induced constipation     Elevated troponin     Guillain-Hitchcock syndrome (H)     History of anaphylaxis due to severe acute respiratory syndrome coronavirus 2 (SARS-CoV-2) vaccine     History of colonic polyps     Hypertensive renal disease     Hypocalcemia     Immunoglobulin deficiency (H)     Localized enlarged lymph nodes     Nonalcoholic steatohepatitis     Noninfectious gastroenteritis     Other muscle spasm     Polyp of colon     Presence of implanted infusion pump     Retention of urine     Spinal stenosis at L4-L5 level     Stasis ulcer (H)     Chronic pain     Cerebral infarction, unspecified (H)     Venous stasis ulcer of other part of left lower leg limited to breakdown of skin without varicose veins (H)     Chronic ulcer of right leg, limited to breakdown of skin (H)     Acute pain of left shoulder     Tear of left biceps muscle     Non-pressure ulcer of right lower extremity with fat layer exposed (H)     Chronic ulcer of left leg with fat layer exposed (H)     Past Medical History:   Diagnosis Date     Acquired lymphedema 2/4/2019     Allergic state      Amyloidosis (H)     followed by hematology Dr. Romeo status post peripheral stem cell transplant     Anxiety 5/11/2016     Anxiety and depression 12/18/2013     Bipolar I disorder (H) 9/1/2015    Under care of psychiatrist NEISHA- Dr Rahman doxepin 25 mg, 2 cap bedtime DULoxetine 20 mg once daily  OLANZapine 7.5 mg  1 tab bedtime      DDD (degenerative disc disease), lumbar 8/6/2020     Depressive disorder 1995     EKG abnormality 4/20/2020     H/O bone marrow transplant (H)      Headache(784.0)      History of diverticulitis  1/27/2015    1/15-rxed with antibiotics      Hyperlipidemia LDL goal <100 10/31/2010     Hypertension 2007     Hypertension goal BP (blood pressure) < 140/90 10/11/2011     Marianne (H) 8/20/2015     Morbid obesity (H) 8/28/2018     Myalgia and myositis, unspecified      Narcotic dependence (H) 9/6/2016    None in about 6 months- 8/1/17     NSTEMI (non-ST elevated myocardial infarction) (H) 04/19/2020     OCD (obsessive compulsive disorder)      Other acquired absence of organ 94     Other cirrhosis of liver (H) possibly from vences  4/15/2020     Other specified viral warts      Peripheral edema 5/20/2018    Noncardiac Continue with  furosemide (LASIX) 20 MG tablet,  potassium       chloride SA (K-DUR/KLOR-CON M) 10 MEQ CR  Tab once daily  Basic metabolic panel     Polyneuropathy associated with underlying disease (H) 2/4/2019     Restless legs syndrome (RLS) 6/29/2017     Stasis dermatitis of both legs 12/31/2018     Type 2 diabetes mellitus with other specified complication (H) 7/10/2017     Exam:  BP (!) 214/107 (BP Location: Right arm, Patient Position: Sitting)   Pulse 94   Temp 97  F (36.1  C)   Wound (used by OP [facility]I only) 03/16/23 0911 Right lateral;lower leg ulceration, venous (Active)   Thickness/Stage partial thickness 05/10/23 1126   Base scab 05/10/23 1126   Periwound redness;swelling 05/10/23 1126   Periwound Temperature warm 05/10/23 1126   Periwound Skin Turgor soft 05/10/23 1126   Edges open 05/10/23 1126   Length (cm) 0.4 05/10/23 1126   Width (cm) 0.4 05/10/23 1126   Depth (cm) 0.1 05/10/23 1126   Wound (cm^2) 0.16 cm^2 05/10/23 1126   Wound Volume (cm^3) 0.02 cm^3 05/10/23 1126   Wound healing % 88.57 05/10/23 1126   Drainage Characteristics/Odor serosanguineous 05/10/23 1126   Drainage Amount scant 05/10/23 1126   Care, Wound non-select wound debridement performed 05/10/23 1126       Wound (used by OP WHGIAN only) 03/16/23 0911 Right 1 toe neuropathic ulcer (Active)   Thickness/Stage full thickness  05/10/23 1126   Base dry;other (see comments) 05/10/23 1126   Periwound dry 05/10/23 1126   Periwound Temperature cool 05/10/23 1126   Periwound Skin Turgor firm 05/10/23 1126   Edges callused 05/10/23 1126   Length (cm) 2.4 05/10/23 1126   Width (cm) 1.5 05/10/23 1126   Depth (cm) 0.2 05/10/23 1126   Wound (cm^2) 3.6 cm^2 05/10/23 1126   Wound Volume (cm^3) 0.72 cm^3 05/10/23 1126   Wound healing % -958.82 05/10/23 1126   Drainage Characteristics/Odor serosanguineous 05/10/23 1126   Drainage Amount moderate 05/10/23 1126   Care, Wound non-select wound debridement performed 05/10/23 1126       Wound (used by OP [facility]I only) 05/10/23 1136 Left lateral;lower leg ulceration, venous (Active)   Thickness/Stage full thickness 05/10/23 1126   Base pink 05/10/23 1126   Periwound redness;swelling 05/10/23 1126   Periwound Temperature hot 05/10/23 1126   Periwound Skin Turgor firm 05/10/23 1126   Edges open 05/10/23 1126   Length (cm) 3.2 05/10/23 1126   Width (cm) 2.5 05/10/23 1126   Depth (cm) 0.1 05/10/23 1126   Wound (cm^2) 8 cm^2 05/10/23 1126   Wound Volume (cm^3) 0.8 cm^3 05/10/23 1126   Drainage Characteristics/Odor serosanguineous 05/10/23 1126   Drainage Amount moderate 05/10/23 1126   Care, Wound non-select wound debridement performed 05/10/23 1126     Bilateral lower extremity cellulitis, lower extremity ulcerations, level of cellulitis outlined in skin.  Heel ulcerations.  Palpable bilateral pedal pulses.  Significant callus at the right medial toe was debrided after 4% lidocaine application, wound size is approximately 4 cm , this was debrided with sharp callus cutter, hypertrophic toenails were also debrided right and left feet, right great toe wound was soaked in hypochlorous acid Vashe.        Impression: Bilateral extremity cellulitis, lower extremity ulcerations, right great toe ulceration, diabetes    Plan: Discussed hospitalization for IV antibiotics, he will consider, will initiate oral antibiotics in the  meantime, discussed wound cares, legs were marked with a pen to demonstrate recurrent cellulitis's, I believe this would be best treated in the hospital with IV antibiotics, patient understands and will further consider.  Patient will return to the clinic in 2 weeks time      Further instructions from your care team         Parmjit Hernández      1955    A DME order for supplies has been placed to [facility]. If there are any issues with your order including not receiving the order please call [facility] at [phone] option 3. They can also provide a tracking number for you if you had supplies shipped to you.     Medications/supplements to aid in healin. Vitamin D3 5,000 iu per day  2. Rosalia C 1,000 mg take daily  3. Vitamin B Complex with folic acid take 1 tablet daily   4. Vitamin B 12 1000 mcg daily  5. Lymphatic Formula 1000: Take one (1) 500mg capsule daily for the rest of your life.    400mg Magnesium Sulfate at bedtime to help leg pain    Antibiotics as prescribed + probiotics to help with diarrhea (take 2 hours before or 2 hours after antibiotics)     Continue nail and callus care. Callus care is essential to preventing skin infections.     Wound care recommendations to right 1st toe and right lateral lower leg & left lateral lower leg, and skin care to both feet/legs, twice daily:  - Cleanse both legs with mild unscented soap and water (such as Cetaphil, Cerave or Dove)    - Soak in vinegar solution for 15 minutes (Wrap paper towels around both feet and legs, then pour vinegar solution on)  - Use pumice stone on toe when callus is present  - Apply AmLactin to feet and legs  - Apply light layer of crushed Flagyl to wounds  - Apply Spandage size 4 on right 1st toe; Apply Spandage size 6/MTSpandage size 3 from toes to knee  - Apply two gauze 4x4s on top of Spandage; Secure with 1 roll gauze & tape  - Apply Spandigrip size F from toes to ankle and Spandigrip size G from ankle to  knees  Change daily     How to prepare the vinegar solution:  1. Mix 2 tablespoons of white household vinegar with 2 cups of water  2. Store in the refrigerator and use for up to 5 days      Compression:   Your compression is Spandigrip F & G and can be removed at night and put back on first thing in the morning.   Please remove compression dressing if toes turn blue and/or tingle and can not be relieved by raising the leg for one hour. If unable to reapply in the morning, keep compression on until next dressing change.    Walk as much as you can, as you are able. Whenever you sit raise your ankle above your hips to promote wound healing.       Keep blood sugars below 150 and A1C below 7    Continue to follow with Dr. Nabil Stanford M.D. May 10, 2023    ROUTINE FOOT CARE (NAIL TRIMMING / CALLUSES)    Go to [email or web address] (American Foot [employer]es Association) and search for providers near you.  Otherwise, this is a list we have gathered of  recommended locations/providers in [place].    [facility]   [phone]   [facility]  [phone]  [email or web address]   [facility]  [phone]  [place] + 15 mile radius [facility]  [phone]  [email or web address]   [facility]  [address], [place] [id number] [phone] Ravi Rodas DPM  [address] [place] [address] [phone]   [facility]  [phone] [address] [place] [address]  [facility]  Dr. Marshal De Jesus  [phone]  [facility]  [phone]  [place] & [place] Locations  (does not take BCBS) FYI:  *Some providers accept insurance while others are out of pocket. Please contact them for details*        Call us at [phone] if you have any questions about your wounds, have redness or swelling around your wound, have a fever of 101 or greater or if you have any other problems or concerns. We answer the phone Monday through Friday 8 am  to 4 pm, please leave a message as we check the voicemail frequently throughout the day.     If you had a positive experience please indicate that on your patient satisfaction survey form that [facility] will be sending you.    It was a pleasure meeting with you today.  Thank you for allowing me and my team the privilege of caring for you today.  YOU are the reason we are here, and I truly hope we provided you with the excellent service you deserve.  Please let us know if there is anything else we can do for you so that we can be sure you are leaving completely satisfied with your care experience.      If you have any billing related questions please call the [facility] Business office at [phone]. The clinic staff does not handle billing related matters.    If you are scheduled to have a follow up appointment, you will receive a reminder call the day before your visit. On the appointment day please arrive 15 minutes prior to your appointment time. If you are unable to keep that appointment, please call the clinic to cancel or reschedule. If you are more than 10 minutes late or greater for your appointment, the clinic policy is that you may be asked to reschedule.         Durable Medical Equipment Wound Care Orders     Wound Care Order for DME - ONLY FOR DME   As directed      DME Provider: stephan Santiago    Start Date: 5/10/2023    Wound Supply Order Options: Complex Wound    Optional: .dmewound can be used to pull in order specific information into documentation    Wound Number:  Wound 1  Wound 2       Wound 1 Location: right lateral lower leg    Wound 1 Dressing Change Frequency: Daily    Wound 1 Length of Need: 30 days    Wound 1 - Dressing Supplies:  Tape/Securing  Wrap/Gauze  Other Dressing Supplies       Wound 1 - Wrap/Gauze Types:  Rolls  Pads       Wound 1 - Roll Type: Conforming Roll Gauze    Wound 1 - Conforming Roll Gauze Size: 4 x 4.1    Wound 1 - Conforming Roll Gauze Quantity: Other  (Comments) Comment - 30    Wound 1 - Pad Type: Sterile Gauze Pads    Wound 1 - Gauze Pad Size: 4 x 4    Wound 1 - Gauze Qty for Dressing/Month: 60 (30 packages of 2)    Wound 1 - Tape Type: Medipore    Wound 1 - Medipore Size: 2 x 10    Wound 1 - Medipore Quantity: 1 Roll    Wound 1 - Tubular Dressing Type: Other (Comments)    Wound 1 - Other Tubular Dressing Size: MTSpandage size 3    Wound 1 - Other Tubular Dressing Quantity: 1 box    Wound 1 - Other Dressing Supplies Type: Misc    Wound 1 - Other Wound Supplies Misc: Other (comments)    Wound 1 - Other Misc Wound Supply Size: Spandigrip size F and G    Wound 1 - Other Misc Wound Supply Quantity: 3 yards of each if possible    Wound 2 Location: left lateral lower leg    Wound 2 Dressing Change Frequency: Daily    Wound 2 Length of Need: 30 days    Wound 2 - Dressing Supplies:  Other Dressing Supplies  Wrap/Gauze  Tape/Securing       Wound 2 - Wrap/Gauze Types:  Rolls  Pads       Wound 2 - Roll Type: Conforming Roll Gauze    Wound 2 - Conforming Roll Gauze Size: 4 x 4.1    Wound 2 - Conforming Roll Gauze Quantity: Other (Comments) Comment - 30    Wound 2 - Pad Type: Sterile Gauze Pads    Wound 2 - Gauze Pad Size: 4 x 4    Wound 2 - Gauze Qty for Dressing/Month: 60 (30 packages of 2)    Wound 2 - Tape Type: Medipore    Wound 2 - Medipore Size: 2 x 10    Wound 2 - Medipore Quantity: 1 Roll    Wound 2 - Tubular Dressing Type: Other (Comments)    Wound 2 - Other Tubular Dressing Size: MTSpandage size 3    Wound 2 - Other Tubular Dressing Quantity: 1 box    Wound 2 - Other Dressing Supplies Type: Misc    Wound 2 - Other Wound Supplies Misc: Other (comments)    Wound 2 - Other Misc Wound Supply Size: Spandigrip size F and G    Wound 2 - Other Misc Wound Supply Quantity: 3 yards of each if possible    Non-pressure ulcer of right lower extremity with fat layer exposed (H)  Chronic ulcer of left leg with fat layer exposed (H)        Regino Stanford MD on 5/10/2023 at 5:05  PM          Dictated using Dragon voice recognition software which may result in transcription errors

## 2023-05-10 NOTE — DISCHARGE INSTRUCTIONS
Parmjit Hernández      1955    A DME order for supplies has been placed to [facility]. If there are any issues with your order including not receiving the order please call [facility] at [phone] option 3. They can also provide a tracking number for you if you had supplies shipped to you.     Medications/supplements to aid in healing:  Vitamin D3 5,000 iu per day  Rosalia C 1,000 mg take daily  Vitamin B Complex with folic acid take 1 tablet daily   Vitamin B 12 1000 mcg daily  5. Lymphatic Formula 1000: Take one (1) 500mg capsule daily for the rest of your life.    400mg Magnesium Sulfate at bedtime to help leg pain    Antibiotics as prescribed + probiotics to help with diarrhea (take 2 hours before or 2 hours after antibiotics)     Continue nail and callus care. Callus care is essential to preventing skin infections.     Wound care recommendations to right 1st toe and right lateral lower leg & left lateral lower leg, and skin care to both feet/legs, twice daily:  - Cleanse both legs with mild unscented soap and water (such as Cetaphil, Cerave or Dove)    - Soak in vinegar solution for 15 minutes (Wrap paper towels around both feet and legs, then pour vinegar solution on)  - Use pumice stone on toe when callus is present  - Apply AmLactin to feet and legs  - Apply light layer of crushed Flagyl to wounds  - Apply Spandage size 4 on right 1st toe; Apply Spandage size 6/MTSpandage size 3 from toes to knee  - Apply two gauze 4x4s on top of Spandage; Secure with 1 roll gauze & tape  - Apply Spandigrip size F from toes to ankle and Spandigrip size G from ankle to knees  Change daily     How to prepare the vinegar solution:  1. Mix 2 tablespoons of white household vinegar with 2 cups of water  2. Store in the refrigerator and use for up to 5 days      Compression:   Your compression is Spandigrip F & G and can be removed at night and put back on first thing in the morning.   Please remove  compression dressing if toes turn blue and/or tingle and can not be relieved by raising the leg for one hour. If unable to reapply in the morning, keep compression on until next dressing change.    Walk as much as you can, as you are able. Whenever you sit raise your ankle above your hips to promote wound healing.       Keep blood sugars below 150 and A1C below 7    Continue to follow with Dr. Nabil Stanford M.D. May 10, 2023    ROUTINE FOOT CARE (NAIL TRIMMING / CALLUSES)    Go to [email or web address] (American Foot Care Nurses Association) and search for providers near you.  Otherwise, this is a list we have gathered of  recommended locations/providers in [place].    [facility]   [phone]   [facility]  [phone]  [email or web address]   [facility]  [phone]  [place] + [place] mile radius [facility]  [phone]  [email or web address]   [facility]  [address] [place] [address] [phone] Ravi Rodas DPM  [address] [address] [address] [phone]   [facility]  [phone] [address] [place] [address]  [facility]  Dr. Marshal De Jesus  [phone]  [facility]  [phone]  [place] & [place] Locations  (does not take BCBS) FYI:  *Some providers accept insurance while others are out of pocket. Please contact them for details*        Call us at [phone] if you have any questions about your wounds, have redness or swelling around your wound, have a fever of 101 or greater or if you have any other problems or concerns. We answer the phone Monday through Friday 8 am to 4 pm, please leave a message as we check the voicemail frequently throughout the day.     If you had a positive experience please indicate that on your patient satisfaction survey form that [facility] will be sending you.    It was a pleasure meeting with you today.  Thank you for allowing me and my team the privilege  of caring for you today.  YOU are the reason we are here, and I truly hope we provided you with the excellent service you deserve.  Please let us know if there is anything else we can do for you so that we can be sure you are leaving completely satisfied with your care experience.      If you have any billing related questions please call the [facility] Business office at [phone]. The clinic staff does not handle billing related matters.    If you are scheduled to have a follow up appointment, you will receive a reminder call the day before your visit. On the appointment day please arrive 15 minutes prior to your appointment time. If you are unable to keep that appointment, please call the clinic to cancel or reschedule. If you are more than 10 minutes late or greater for your appointment, the clinic policy is that you may be asked to reschedule.

## 2023-05-11 ENCOUNTER — HOSPITAL ENCOUNTER (INPATIENT)
Facility: CLINIC | Age: 68
LOS: 4 days | Discharge: HOME | DRG: 603 | End: 2023-05-15
Attending: EMERGENCY MEDICINE | Admitting: INTERNAL MEDICINE
Payer: COMMERCIAL

## 2023-05-11 ENCOUNTER — TELEPHONE (OUTPATIENT)
Dept: FAMILY MEDICINE | Facility: CLINIC | Age: 68
End: 2023-05-11

## 2023-05-11 DIAGNOSIS — L03.115 BILATERAL LOWER LEG CELLULITIS: ICD-10-CM

## 2023-05-11 DIAGNOSIS — L03.116 BILATERAL LOWER LEG CELLULITIS: ICD-10-CM

## 2023-05-11 DIAGNOSIS — G47.33 OBSTRUCTIVE SLEEP APNEA SYNDROME: ICD-10-CM

## 2023-05-11 DIAGNOSIS — M25.512 ACUTE PAIN OF LEFT SHOULDER: Primary | ICD-10-CM

## 2023-05-11 LAB
ALBUMIN SERPL BCG-MCNC: 4.3 G/DL (ref 3.5–5.2)
ALP SERPL-CCNC: 119 U/L (ref 40–129)
ALT SERPL W P-5'-P-CCNC: 47 U/L (ref 10–50)
ANION GAP SERPL CALCULATED.3IONS-SCNC: 15 MMOL/L (ref 7–15)
AST SERPL W P-5'-P-CCNC: 29 U/L (ref 10–50)
BASOPHILS # BLD AUTO: 0.1 10E3/UL (ref 0–0.2)
BASOPHILS NFR BLD AUTO: 1 %
BILIRUB SERPL-MCNC: 0.5 MG/DL
BUN SERPL-MCNC: 15.6 MG/DL (ref 8–23)
CALCIUM SERPL-MCNC: 9.8 MG/DL (ref 8.8–10.2)
CHLORIDE SERPL-SCNC: 98 MMOL/L (ref 98–107)
CREAT SERPL-MCNC: 0.86 MG/DL (ref 0.67–1.17)
CRP SERPL-MCNC: 26.69 MG/L
DEPRECATED HCO3 PLAS-SCNC: 24 MMOL/L (ref 22–29)
EOSINOPHIL # BLD AUTO: 0.3 10E3/UL (ref 0–0.7)
EOSINOPHIL NFR BLD AUTO: 3 %
ERYTHROCYTE [DISTWIDTH] IN BLOOD BY AUTOMATED COUNT: 13.2 % (ref 10–15)
GFR SERPL CREATININE-BSD FRML MDRD: >90 ML/MIN/1.73M2
GLUCOSE SERPL-MCNC: 237 MG/DL (ref 70–99)
HCT VFR BLD AUTO: 39.6 % (ref 40–53)
HGB BLD-MCNC: 14.4 G/DL (ref 13.3–17.7)
HOLD SPECIMEN: NORMAL
IMM GRANULOCYTES # BLD: 0 10E3/UL
IMM GRANULOCYTES NFR BLD: 0 %
LYMPHOCYTES # BLD AUTO: 0.9 10E3/UL (ref 0.8–5.3)
LYMPHOCYTES NFR BLD AUTO: 12 %
MCH RBC QN AUTO: 33 PG (ref 26.5–33)
MCHC RBC AUTO-ENTMCNC: 36.4 G/DL (ref 31.5–36.5)
MCV RBC AUTO: 91 FL (ref 78–100)
MONOCYTES # BLD AUTO: 0.4 10E3/UL (ref 0–1.3)
MONOCYTES NFR BLD AUTO: 6 %
NEUTROPHILS # BLD AUTO: 5.8 10E3/UL (ref 1.6–8.3)
NEUTROPHILS NFR BLD AUTO: 78 %
NRBC # BLD AUTO: 0 10E3/UL
NRBC BLD AUTO-RTO: 0 /100
PLATELET # BLD AUTO: 158 10E3/UL (ref 150–450)
POTASSIUM SERPL-SCNC: 3.4 MMOL/L (ref 3.4–5.3)
PROCALCITONIN SERPL IA-MCNC: 0.16 NG/ML
PROT SERPL-MCNC: 6.8 G/DL (ref 6.4–8.3)
RBC # BLD AUTO: 4.37 10E6/UL (ref 4.4–5.9)
SODIUM SERPL-SCNC: 137 MMOL/L (ref 136–145)
WBC # BLD AUTO: 7.5 10E3/UL (ref 4–11)

## 2023-05-11 PROCEDURE — 120N000001 HC R&B MED SURG/OB

## 2023-05-11 PROCEDURE — 99285 EMERGENCY DEPT VISIT HI MDM: CPT

## 2023-05-11 PROCEDURE — 36415 COLL VENOUS BLD VENIPUNCTURE: CPT | Performed by: EMERGENCY MEDICINE

## 2023-05-11 PROCEDURE — 84145 PROCALCITONIN (PCT): CPT | Performed by: EMERGENCY MEDICINE

## 2023-05-11 PROCEDURE — 250N000013 HC RX MED GY IP 250 OP 250 PS 637: Performed by: EMERGENCY MEDICINE

## 2023-05-11 PROCEDURE — 250N000013 HC RX MED GY IP 250 OP 250 PS 637: Performed by: INTERNAL MEDICINE

## 2023-05-11 PROCEDURE — 99223 1ST HOSP IP/OBS HIGH 75: CPT | Performed by: INTERNAL MEDICINE

## 2023-05-11 PROCEDURE — 250N000011 HC RX IP 250 OP 636: Performed by: INTERNAL MEDICINE

## 2023-05-11 PROCEDURE — 250N000011 HC RX IP 250 OP 636: Performed by: EMERGENCY MEDICINE

## 2023-05-11 PROCEDURE — 85025 COMPLETE CBC W/AUTO DIFF WBC: CPT | Performed by: EMERGENCY MEDICINE

## 2023-05-11 PROCEDURE — 86140 C-REACTIVE PROTEIN: CPT | Performed by: EMERGENCY MEDICINE

## 2023-05-11 PROCEDURE — 80053 COMPREHEN METABOLIC PANEL: CPT | Performed by: EMERGENCY MEDICINE

## 2023-05-11 RX ORDER — METOPROLOL SUCCINATE 50 MG/1
50 TABLET, EXTENDED RELEASE ORAL DAILY
Status: DISCONTINUED | OUTPATIENT
Start: 2023-05-12 | End: 2023-05-15 | Stop reason: HOSPADM

## 2023-05-11 RX ORDER — PROCHLORPERAZINE MALEATE 5 MG
5 TABLET ORAL EVERY 6 HOURS PRN
Status: DISCONTINUED | OUTPATIENT
Start: 2023-05-11 | End: 2023-05-15 | Stop reason: HOSPADM

## 2023-05-11 RX ORDER — AMMONIUM LACTATE 12 G/100G
LOTION TOPICAL DAILY
Status: DISCONTINUED | OUTPATIENT
Start: 2023-05-12 | End: 2023-05-15 | Stop reason: HOSPADM

## 2023-05-11 RX ORDER — SUMATRIPTAN 50 MG/1
50 TABLET, FILM COATED ORAL
Status: DISCONTINUED | OUTPATIENT
Start: 2023-05-11 | End: 2023-05-15 | Stop reason: HOSPADM

## 2023-05-11 RX ORDER — DULOXETIN HYDROCHLORIDE 30 MG/1
30 CAPSULE, DELAYED RELEASE ORAL 2 TIMES DAILY
Status: DISCONTINUED | OUTPATIENT
Start: 2023-05-11 | End: 2023-05-15 | Stop reason: HOSPADM

## 2023-05-11 RX ORDER — LOPERAMIDE HCL 2 MG
2 CAPSULE ORAL 4 TIMES DAILY PRN
Status: DISCONTINUED | OUTPATIENT
Start: 2023-05-11 | End: 2023-05-15 | Stop reason: HOSPADM

## 2023-05-11 RX ORDER — OXYCODONE HYDROCHLORIDE 5 MG/1
10 TABLET ORAL ONCE
Status: COMPLETED | OUTPATIENT
Start: 2023-05-11 | End: 2023-05-11

## 2023-05-11 RX ORDER — MULTIPLE VITAMINS W/ MINERALS TAB 9MG-400MCG
1 TAB ORAL DAILY
Status: DISCONTINUED | OUTPATIENT
Start: 2023-05-12 | End: 2023-05-15 | Stop reason: HOSPADM

## 2023-05-11 RX ORDER — ONDANSETRON 4 MG/1
4 TABLET, ORALLY DISINTEGRATING ORAL EVERY 6 HOURS PRN
Status: DISCONTINUED | OUTPATIENT
Start: 2023-05-11 | End: 2023-05-15 | Stop reason: HOSPADM

## 2023-05-11 RX ORDER — ACETAMINOPHEN 650 MG/1
650 SUPPOSITORY RECTAL EVERY 6 HOURS PRN
Status: DISCONTINUED | OUTPATIENT
Start: 2023-05-11 | End: 2023-05-15 | Stop reason: HOSPADM

## 2023-05-11 RX ORDER — NALOXONE HYDROCHLORIDE 0.4 MG/ML
0.2 INJECTION, SOLUTION INTRAMUSCULAR; INTRAVENOUS; SUBCUTANEOUS
Status: DISCONTINUED | OUTPATIENT
Start: 2023-05-11 | End: 2023-05-15 | Stop reason: HOSPADM

## 2023-05-11 RX ORDER — TAMSULOSIN HYDROCHLORIDE 0.4 MG/1
0.4 CAPSULE ORAL 2 TIMES DAILY
Status: DISCONTINUED | OUTPATIENT
Start: 2023-05-11 | End: 2023-05-15 | Stop reason: HOSPADM

## 2023-05-11 RX ORDER — AMOXICILLIN 250 MG
2 CAPSULE ORAL 2 TIMES DAILY PRN
Status: DISCONTINUED | OUTPATIENT
Start: 2023-05-11 | End: 2023-05-15 | Stop reason: HOSPADM

## 2023-05-11 RX ORDER — NALOXONE HYDROCHLORIDE 0.4 MG/ML
0.4 INJECTION, SOLUTION INTRAMUSCULAR; INTRAVENOUS; SUBCUTANEOUS
Status: DISCONTINUED | OUTPATIENT
Start: 2023-05-11 | End: 2023-05-15 | Stop reason: HOSPADM

## 2023-05-11 RX ORDER — METRONIDAZOLE 500 MG/1
500 TABLET ORAL DAILY
Status: DISCONTINUED | OUTPATIENT
Start: 2023-05-12 | End: 2023-05-15 | Stop reason: HOSPADM

## 2023-05-11 RX ORDER — CEFAZOLIN SODIUM 2 G/100ML
2 INJECTION, SOLUTION INTRAVENOUS EVERY 8 HOURS
Status: DISCONTINUED | OUTPATIENT
Start: 2023-05-11 | End: 2023-05-15 | Stop reason: HOSPADM

## 2023-05-11 RX ORDER — BUPROPION HYDROCHLORIDE 150 MG/1
150 TABLET ORAL EVERY MORNING
Status: DISCONTINUED | OUTPATIENT
Start: 2023-05-12 | End: 2023-05-15 | Stop reason: HOSPADM

## 2023-05-11 RX ORDER — TESTOSTERONE GEL, 1% 10 MG/G
50 GEL TRANSDERMAL DAILY
Status: DISCONTINUED | OUTPATIENT
Start: 2023-05-12 | End: 2023-05-15 | Stop reason: HOSPADM

## 2023-05-11 RX ORDER — PROCHLORPERAZINE 25 MG
12.5 SUPPOSITORY, RECTAL RECTAL EVERY 12 HOURS PRN
Status: DISCONTINUED | OUTPATIENT
Start: 2023-05-11 | End: 2023-05-15 | Stop reason: HOSPADM

## 2023-05-11 RX ORDER — AMOXICILLIN 250 MG
1 CAPSULE ORAL 2 TIMES DAILY PRN
Status: DISCONTINUED | OUTPATIENT
Start: 2023-05-11 | End: 2023-05-15 | Stop reason: HOSPADM

## 2023-05-11 RX ORDER — NITROGLYCERIN 0.4 MG/1
0.4 TABLET SUBLINGUAL EVERY 5 MIN PRN
Status: DISCONTINUED | OUTPATIENT
Start: 2023-05-11 | End: 2023-05-15 | Stop reason: HOSPADM

## 2023-05-11 RX ORDER — METOPROLOL SUCCINATE 50 MG/1
50 TABLET, EXTENDED RELEASE ORAL DAILY
COMMUNITY
End: 2023-07-05

## 2023-05-11 RX ORDER — ASPIRIN 81 MG/1
81 TABLET ORAL DAILY
Status: DISCONTINUED | OUTPATIENT
Start: 2023-05-12 | End: 2023-05-15 | Stop reason: HOSPADM

## 2023-05-11 RX ORDER — OXYCODONE AND ACETAMINOPHEN 5; 325 MG/1; MG/1
1 TABLET ORAL 2 TIMES DAILY
Status: DISCONTINUED | OUTPATIENT
Start: 2023-05-11 | End: 2023-05-15 | Stop reason: HOSPADM

## 2023-05-11 RX ORDER — CEFAZOLIN SODIUM 2 G/100ML
2 INJECTION, SOLUTION INTRAVENOUS ONCE
Status: COMPLETED | OUTPATIENT
Start: 2023-05-11 | End: 2023-05-11

## 2023-05-11 RX ORDER — NICOTINE POLACRILEX 4 MG
15-30 LOZENGE BUCCAL
Status: DISCONTINUED | OUTPATIENT
Start: 2023-05-11 | End: 2023-05-15 | Stop reason: HOSPADM

## 2023-05-11 RX ORDER — PREGABALIN 100 MG/1
100 CAPSULE ORAL 3 TIMES DAILY
Status: DISCONTINUED | OUTPATIENT
Start: 2023-05-11 | End: 2023-05-15 | Stop reason: HOSPADM

## 2023-05-11 RX ORDER — DEXTROSE MONOHYDRATE 25 G/50ML
25-50 INJECTION, SOLUTION INTRAVENOUS
Status: DISCONTINUED | OUTPATIENT
Start: 2023-05-11 | End: 2023-05-15 | Stop reason: HOSPADM

## 2023-05-11 RX ORDER — LOSARTAN POTASSIUM 100 MG/1
100 TABLET ORAL DAILY
Status: DISCONTINUED | OUTPATIENT
Start: 2023-05-12 | End: 2023-05-15 | Stop reason: HOSPADM

## 2023-05-11 RX ORDER — METFORMIN HCL 500 MG
1000 TABLET, EXTENDED RELEASE 24 HR ORAL 2 TIMES DAILY WITH MEALS
Status: DISCONTINUED | OUTPATIENT
Start: 2023-05-12 | End: 2023-05-15 | Stop reason: HOSPADM

## 2023-05-11 RX ORDER — ACETAMINOPHEN 325 MG/1
650 TABLET ORAL EVERY 6 HOURS PRN
Status: DISCONTINUED | OUTPATIENT
Start: 2023-05-11 | End: 2023-05-15 | Stop reason: HOSPADM

## 2023-05-11 RX ORDER — LIDOCAINE 40 MG/G
CREAM TOPICAL
Status: DISCONTINUED | OUTPATIENT
Start: 2023-05-11 | End: 2023-05-15 | Stop reason: HOSPADM

## 2023-05-11 RX ORDER — POLYETHYLENE GLYCOL 3350 17 G/17G
17 POWDER, FOR SOLUTION ORAL DAILY PRN
Status: DISCONTINUED | OUTPATIENT
Start: 2023-05-11 | End: 2023-05-15 | Stop reason: HOSPADM

## 2023-05-11 RX ORDER — ONDANSETRON 2 MG/ML
4 INJECTION INTRAMUSCULAR; INTRAVENOUS EVERY 6 HOURS PRN
Status: DISCONTINUED | OUTPATIENT
Start: 2023-05-11 | End: 2023-05-15 | Stop reason: HOSPADM

## 2023-05-11 RX ADMIN — PREGABALIN 100 MG: 100 CAPSULE ORAL at 22:54

## 2023-05-11 RX ADMIN — OXYCODONE HYDROCHLORIDE 10 MG: 5 TABLET ORAL at 15:59

## 2023-05-11 RX ADMIN — DULOXETINE HYDROCHLORIDE 30 MG: 30 CAPSULE, DELAYED RELEASE ORAL at 22:54

## 2023-05-11 RX ADMIN — ATORVASTATIN CALCIUM 30 MG: 20 TABLET, FILM COATED ORAL at 22:54

## 2023-05-11 RX ADMIN — CEFAZOLIN SODIUM 2 G: 2 INJECTION, SOLUTION INTRAVENOUS at 16:32

## 2023-05-11 RX ADMIN — OXYCODONE HYDROCHLORIDE AND ACETAMINOPHEN 1 TABLET: 5; 325 TABLET ORAL at 22:54

## 2023-05-11 RX ADMIN — ACETAMINOPHEN 650 MG: 325 TABLET ORAL at 21:42

## 2023-05-11 RX ADMIN — CEFAZOLIN SODIUM 2 G: 2 INJECTION, SOLUTION INTRAVENOUS at 22:59

## 2023-05-11 RX ADMIN — TAMSULOSIN HYDROCHLORIDE 0.4 MG: 0.4 CAPSULE ORAL at 22:54

## 2023-05-11 ASSESSMENT — ACTIVITIES OF DAILY LIVING (ADL)
ADLS_ACUITY_SCORE: 35

## 2023-05-11 NOTE — TELEPHONE ENCOUNTER
Please contact Erickson and let him know that he should go to the ED for this so that he can get started on IV antibiotics right away and get admitted. Thanks, DE

## 2023-05-11 NOTE — ED TRIAGE NOTES
Pt presents with bilateral low leg swelling and redness. Pt was treated for r leg cellulitis 1 month ago     Triage Assessment     Row Name 05/11/23 1416       Triage Assessment (Adult)    Airway WDL WDL       Respiratory WDL    Respiratory WDL WDL       Skin Circulation/Temperature WDL    Skin Circulation/Temperature WDL X  Redness and swelng bilateral low legs       Cardiac WDL    Cardiac WDL WDL       Peripheral/Neurovascular WDL    Peripheral Neurovascular WDL WDL       Cognitive/Neuro/Behavioral WDL    Cognitive/Neuro/Behavioral WDL WDL

## 2023-05-11 NOTE — ED PROVIDER NOTES
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  Is followed in wound clinic, was seen there yesterday had more extensive dressing changes after he developed a open left lateral leg wound while traveling in [place].  He was prescribed Keflex but did not pick it up and reports increasing pain and spreading redness today prompting his visit here.      Independent Historian:   None - Patient Only    Review of External Notes: I personally reviewed Dr. Stanford and Dr. Garcia's note from yesterday and today respectively.    ROS:  Review of Systems  10 point ROS completed, negative except as indicated in the HPI.    Allergies:  Cephalexin  Liraglutide  Liraglutide  Sulfa Antibiotics     Medications:    acetaminophen (TYLENOL) 500 MG tablet  ammonium lactate (AMLACTIN) 12 % external cream  aspirin (ASPIRIN LOW DOSE) 81 MG tablet  atorvastatin (LIPITOR) 20 MG tablet  Bioflavonoid Products (CANDIDO-C) TABS  blood glucose (NO BRAND SPECIFIED) lancets standard  blood glucose (NO BRAND SPECIFIED) test strip  buPROPion (WELLBUTRIN XL) 150 MG 24 hr tablet  cariprazine (VRAYLAR) 4.5 MG capsule  cephALEXin (KEFLEX) 500 MG capsule  doxycycline hyclate (VIBRAMYCIN) 100 MG capsule  dulaglutide (TRULICITY) 0.75 MG/0.5ML pen  DULoxetine (CYMBALTA) 30 MG capsule  Folic Acid-Vit B6-Vit B12 0.5-5-0.2 MG TABS  glucose 40 % (400 mg/mL) gel  hydrochlorothiazide (HYDRODIURIL) 25 MG tablet  insulin glargine (LANTUS PEN) 100 UNIT/ML pen  loperamide (IMODIUM) 2 MG capsule  losartan (COZAAR) 100 MG tablet  medication given by implanted intrathecal pump  metFORMIN (GLUCOPHAGE XR) 500 MG 24 hr tablet  metoprolol succinate ER (TOPROL-XL) 25 MG 24 hr  tablet  metroNIDAZOLE (FLAGYL) 500 MG tablet  modafinil (PROVIGIL) 200 MG tablet  multivitamin w/minerals (THERA-VIT-M) tablet  nitroGLYcerin (NITROSTAT) 0.4 MG sublingual tablet  oxyCODONE-acetaminophen (PERCOCET) 5-325 MG tablet  pregabalin (LYRICA) 100 MG capsule  senna-docusate (SENOKOT-S/PERICOLACE) 8.6-50 MG tablet  SUMAtriptan (IMITREX) 50 MG tablet  tamsulosin (FLOMAX) 0.4 MG capsule  testosterone (ANDROGEL/TESTIM) 50 MG/5GM (1%) topical gel  vitamin D3 (CHOLECALCIFEROL) 1.25 MG (01187 UT) capsule        Past Medical History:    Past Medical History:   Diagnosis Date     Acquired lymphedema 2/4/2019     Allergic state      Amyloidosis (H)      Anxiety 5/11/2016     Anxiety and depression 12/18/2013     Bipolar I disorder (H) 9/1/2015     DDD (degenerative disc disease), lumbar 8/6/2020     Depressive disorder 1995     EKG abnormality 4/20/2020     H/O bone marrow transplant (H)      Headache(784.0)      History of diverticulitis 1/27/2015     Hyperlipidemia LDL goal <100 10/31/2010     Hypertension 2007     Hypertension goal BP (blood pressure) < 140/90 10/11/2011     Marianne (H) 8/20/2015     Morbid obesity (H) 8/28/2018     Myalgia and myositis, unspecified      Narcotic dependence (H) 9/6/2016     NSTEMI (non-ST elevated myocardial infarction) (H) 04/19/2020     OCD (obsessive compulsive disorder)      Other acquired absence of organ 94     Other cirrhosis of liver (H) possibly from vences  4/15/2020     Other specified viral warts      Peripheral edema 5/20/2018     Polyneuropathy associated with underlying disease (H) 2/4/2019     Restless legs syndrome (RLS) 6/29/2017     Stasis dermatitis of both legs 12/31/2018     Type 2 diabetes mellitus with other specified complication (H) 7/10/2017       Past Surgical History:    Past Surgical History:   Procedure Laterality Date     ABDOMEN SURGERY  2007    abdominal hernia     BACK SURGERY  8/6/2020     BIOPSY  june 2015    negative     BMT PROTOCOL      for  systemic amyloidosis     BONE MARROW BIOPSY, BONE SPECIMEN, NEEDLE/TROCAR N/A 6/8/2016    Procedure: BIOPSY BONE MARROW;  Surgeon: Nathan Agrawal MD;  Location:  GI     BONE MARROW BIOPSY, BONE SPECIMEN, NEEDLE/TROCAR N/A 2/20/2019    Procedure: BIOPSY BONE MARROW;  Surgeon: Michael Raygoza MD;  Location:  GI     CHOLECYSTECTOMY  1995    lap qian     COLONOSCOPY  2012    hx polyps     ESOPHAGOSCOPY, GASTROSCOPY, DUODENOSCOPY (EGD), COMBINED N/A 5/29/2015    Procedure: COMBINED ESOPHAGOSCOPY, GASTROSCOPY, DUODENOSCOPY (EGD), BIOPSY SINGLE OR MULTIPLE;  Surgeon: John Jacob MD;  Location:  GI     HERNIA REPAIR, UMBILICAL  2006     [facility] NONSPECIFIC PROCEDURE  94    Cholecystectomy     [facility] NONSPECIFIC PROCEDURE  2000    repair deviated septum        Family History:    family history includes Alcohol/Drug in his father, maternal grandmother, mother, and paternal grandfather; Allergies in his father and sister; Arthritis in his mother; Asthma in his father; C.A.D. in his father, maternal grandmother, and mother; Cerebrovascular Disease in his maternal grandfather, maternal grandmother, mother, paternal grandfather, and paternal grandmother; Circulatory in his father; Depression in his daughter, father, sister, and son; Diabetes in his brother; Gynecology in his sister; Heart Disease in his father; Hypertension in his father and mother; Psychotic Disorder in his son; Respiratory in his father.    Social History:   reports that he has never smoked. He has never used smokeless tobacco. He reports that he does not currently use alcohol. He reports that he does not currently use drugs after having used the following drugs: Cocaine, Marijuana, Methamphetamines, Opiates, IV, Amphetamines, Barbiturates, Benzodiazepines, Codeine, Fentanyl, Hashish, Hydrocodone, Hydromorphone, LSD, and Oxycodone.  PCP: Sherwin Mejia     Physical Exam     Patient Vitals for the past 24 hrs:   BP Temp  "Temp src Pulse Resp SpO2 Height Weight   05/11/23 1413 (!) 161/100 97.4  F (36.3  C) Temporal 79 20 94 % 1.676 m (5' 6\") 107.5 kg (237 lb)        Physical Exam  Constitutional: Alert, attentive, GCS 15   Eyes: EOM are normal, anicteric, conjugate gaze  CV: distal extremities warm, well perfused  Chest: Non-labored breathing on RA  MSK: Symmetric lower extremity edema, there pressure dressing wrapped with blanching redness extending from feet up to both calves and faintly spreading medially on both legs. (Wounds are well captured in photos and wound clinic yesterday)  Neurological: Alert, attentive, moving all extremities equally.   Skin: Skin is warm and dry.        Emergency Department Course       Laboratory:  Labs Ordered and Resulted from Time of ED Arrival to Time of ED Departure   COMPREHENSIVE METABOLIC PANEL - Abnormal       Result Value    Sodium 137      Potassium 3.4      Chloride 98      Carbon Dioxide (CO2) 24      Anion Gap 15      Urea Nitrogen 15.6      Creatinine 0.86      Calcium 9.8      Glucose 237 (*)     Alkaline Phosphatase 119      AST 29      ALT 47      Protein Total 6.8      Albumin 4.3      Bilirubin Total 0.5      GFR Estimate >90     CRP INFLAMMATION - Abnormal    CRP Inflammation 26.69 (*)    PROCALCITONIN - Abnormal    Procalcitonin 0.16 (*)    CBC WITH PLATELETS AND DIFFERENTIAL - Abnormal    WBC Count 7.5      RBC Count 4.37 (*)     Hemoglobin 14.4      Hematocrit 39.6 (*)     MCV 91      MCH 33.0      MCHC 36.4      RDW 13.2      Platelet Count 158      % Neutrophils 78      % Lymphocytes 12      % Monocytes 6      % Eosinophils 3      % Basophils 1      % Immature Granulocytes 0      NRBCs per 100 WBC 0      Absolute Neutrophils 5.8      Absolute Lymphocytes 0.9      Absolute Monocytes 0.4      Absolute Eosinophils 0.3      Absolute Basophils 0.1      Absolute Immature Granulocytes 0.0      Absolute NRBCs 0.0        Emergency Department Course & " Assessments:    Interventions:  Medications   ceFAZolin (ANCEF) 2 g in 100 mL D5W intermittent infusion (has no administration in time range)   oxyCODONE (ROXICODONE) tablet 10 mg (10 mg Oral $Given 23 0102)        Independent Interpretation (X-rays, CTs, rhythm strip):  None    Consultations/Discussion of Management or Tests:  None        Social Determinants of Health affecting care:   None    Disposition:  The patient was discharged to home.     Impression & Plan      Medical Decision Makin-year-old male complex past medical history including venous stasis, recurrent lower extremity cellulitis, lymphedema, history of bone marrow transplant presenting for spreading redness without associated fevers or chills but increased pain that feels similar to prior episodes cellulitis.  He received wound clinic yesterday, prescribed oral Keflex which she was unable to fill and today had increasing redness and pain prompting his visit.  No evidence of sepsis, screening labs notable for a mildly elevated CRP, procalcitonin is indeterminate he does not have leukocytosis.  Given clinical appearance, difficult to exclude venous stasis versus cellulitis however do think is warranted to treat him with IV antibiotics given the extent of the redness.  Case discussed with Dr. Johnson, will plan for medicine admission.    Diagnosis:    ICD-10-CM    1. Bilateral lower leg cellulitis  L03.116     L03.115            Ravi Moulton MD  [employer]  4:21 PM 23        Ravi Moulton MD  [phone]

## 2023-05-11 NOTE — H&P
INTERNAL MEDICINE HISTORY AND PHYSICAL  [facility]      Parmjit Hernández [MR#: [id number]  : 1955  67 year old male]  Date of Admission:  2023  Primary Care Provider:  Sherwin Mejia      Chief Complaint:   Bilateral lower extremity erythema.    History of Present Illness:   Mr. Parmjit Hernández is a 68 yo male with history including diabetes mellitus type 2; hypertension; coronary artery disease disease; CHF (diastolic); hyperlipidemia; MEJIAS; bipolar disorder; amyloidosis with prior autologous bone marrow transplant; chronic spine/disc disease with chronic pain with intrathecal pain med pump; restless leg syndrome; chronic bilateral extremity edema; who presents with the above issues.      Patient was hospitalized 2023 with right lower cellulitis.  Since that time he has cellulitis resolved.  However more recently about a week ago had some worsening bilateral extremity pain and erythema.  Patient says that about a week ago he had been traveling to his son's wedding and bumped his left lateral leg and there was a dollar coin sized wound on his left lateral leg.  From that he noted increased erythema in his left leg.  Also noted that recently trimmed his right great toe, perhaps too much with subsequent slight toe wound and noted that he had subsequent increased right lower extreme erythema related to this.  He saw Dr. Stanford yesterday 5/10/2023.  At that time he he took care of his bilateral leg wounds.  Dr. Stanford also trimmed his right great toe and felt there was a satisfactory result.  Dr. Stanford prescribed cephalexin and doxycycline but patient did not have time to pick these up.    In the above context, patient noted worsening pain and swelling in his legs bilaterally.  He was instructed to come to [facility] for further evaluation and management including IV antibiotics.    On initial evaluation, patient was afebrile, hypertensive.  Labs showed CBC with  normal white count; BMP was normal; CRP 26.69; procalcitonin 0.16.  He was given IV cefazolin.  Request for admission was made.    Past Medical History:   1. DM2 with neuropathy. Hgb A1C 9.3 2/2023.  2. HTN.  3. CAD. S/p GREG stent in 2021.  4. CHF (diastolic). Echo 2021 showed LVEF 55-60%, mild LVH; RV OK.  5. HLD.  6. NORMA. Not using CPAP.  7. MEJIAS.  8. Bipolar 1 d/o.  9. Amyloidosis, AL type. S/p autologous BM tx 2016. Follows with Dr. Fonseca [facility].  10. Chronic spine/disc disease with chronic pain. Follows with Dr. Shaw [facility]. Has intrathecal pump in situ containing fentanyl, bupivacaine and morphine.  11. RLS.  12. Fibromyalgia.  13. Chronic bilateral lower extremity lymphedema with chronic wounds. Follows with Dr. Stanford.  14. Migraine history.  15. Morbid obesity.  16. H/o Guillain-Lawton syndrome.  17. H/o right lower extremity cellulitis 3/2023.    Past Medical History:   Diagnosis Date     Acquired lymphedema 2/4/2019     Allergic state      Amyloidosis (H)     followed by hematology Dr. Romeo status post peripheral stem cell transplant     Anxiety 5/11/2016     Anxiety and depression 12/18/2013     Bipolar I disorder (H) 9/1/2015    Under care of psychiatrist [facility]- Dr Rahman doxepin 25 mg, 2 cap bedtime DULoxetine 20 mg once daily  OLANZapine 7.5 mg  1 tab bedtime      DDD (degenerative disc disease), lumbar 8/6/2020     Depressive disorder 1995     EKG abnormality 4/20/2020     H/O bone marrow transplant (H)      Headache(784.0)      History of diverticulitis 1/27/2015    1/15-rxed with antibiotics      Hyperlipidemia LDL goal <100 10/31/2010     Hypertension 2007     Hypertension goal BP (blood pressure) < 140/90 10/11/2011     Marianne (H) 8/20/2015     Morbid obesity (H) 8/28/2018     Myalgia and myositis, unspecified      Narcotic dependence (H) 9/6/2016    None in about 6 months- 8/1/17     NSTEMI (non-ST elevated myocardial infarction) (H) 04/19/2020     OCD (obsessive compulsive  disorder)      Other acquired absence of organ 94     Other cirrhosis of liver (H) possibly from vences  4/15/2020     Other specified viral warts      Peripheral edema 5/20/2018    Noncardiac Continue with  furosemide (LASIX) 20 MG tablet,  potassium       chloride SA (K-DUR/KLOR-CON M) 10 MEQ CR  Tab once daily  Basic metabolic panel     Polyneuropathy associated with underlying disease (H) 2/4/2019     Restless legs syndrome (RLS) 6/29/2017     Stasis dermatitis of both legs 12/31/2018     Type 2 diabetes mellitus with other specified complication (H) 7/10/2017     Above past medical history reviewed and edited and/or added to as necessary.    Past Surgical History:     Past Surgical History:   Procedure Laterality Date     ABDOMEN SURGERY  2007    abdominal hernia     BACK SURGERY  8/6/2020     BIOPSY  june 2015    negative     BMT PROTOCOL      for systemic amyloidosis     BONE MARROW BIOPSY, BONE SPECIMEN, NEEDLE/TROCAR N/A 6/8/2016    Procedure: BIOPSY BONE MARROW;  Surgeon: Nathan Agrawal MD;  Location:  GI     BONE MARROW BIOPSY, BONE SPECIMEN, NEEDLE/TROCAR N/A 2/20/2019    Procedure: BIOPSY BONE MARROW;  Surgeon: Michael Raygoza MD;  Location:  GI     CHOLECYSTECTOMY  1995    lap qian     COLONOSCOPY  2012    hx polyps     ESOPHAGOSCOPY, GASTROSCOPY, DUODENOSCOPY (EGD), COMBINED N/A 5/29/2015    Procedure: COMBINED ESOPHAGOSCOPY, GASTROSCOPY, DUODENOSCOPY (EGD), BIOPSY SINGLE OR MULTIPLE;  Surgeon: John Jacob MD;  Location:  GI     HERNIA REPAIR, UMBILICAL  2006     [facility] NONSPECIFIC PROCEDURE  94    Cholecystectomy     [facility] NONSPECIFIC PROCEDURE  2000    repair deviated septum        Allergies:     Allergies   Allergen Reactions     Cephalexin Diarrhea     Liraglutide Other (See Comments), Headache and Unknown     Other reaction(s): Headache, Unknown  PN: migraines - Increased migraine frequency and severity       Sulfa Antibiotics Angioedema and Swelling     Pt has  "taken  Taken sulfa drugs orally without trouble. He had problems with Sulfa eye drops. Eye swelled up          Medications:     Prior to Admission Medications   Prescriptions Last Dose Informant Patient Reported? Taking?   Bioflavonoid Products (CANDIDO-C) TABS 5/9/2023 at AM Self No Yes   Sig: Take 1,000 mg by mouth daily   DULoxetine (CYMBALTA) 30 MG capsule 5/11/2023 at AM Self Yes Yes   Sig: Take 30 mg by mouth 2 times daily   Folic Acid-Vit B6-Vit B12 0.5-5-0.2 MG TABS  at [facility] via [facility] records Self No No   Sig: Take 1 tablet by mouth daily   OVER-THE-COUNTER 5/10/2023 at AM Self Yes Yes   Sig: Take 3 capsules by mouth daily Supplement for brain health, unknown brand/ingredients   POTASSIUM PO 5/10/2023 at AM Self Yes Yes   Sig: Take 1 tablet by mouth daily Unspecified tablet strength; patient estimated \"600 mg, +/- 200 mg\", takes for leg cramps   SUMAtriptan (IMITREX) 50 MG tablet 5/10/2023 at Unknown time Self No Yes   Sig: Take 1 tablet (50 mg) by mouth at onset of headache for migraine May repeat in 2 hours. Max 4 tablets/24 hours.   acetaminophen (TYLENOL) 500 MG tablet 5/11/2023 at AM Self Yes Yes   Sig: Take 1,000 mg by mouth every 8 hours as needed   ammonium lactate (AMLACTIN) 12 % external cream Unknown at prescribed 5/10/23, not yet started Self No Yes   Sig: Apply topically daily To feet and legs daily & as needed   aspirin (ASPIRIN LOW DOSE) 81 MG tablet 5/11/2023 at AM Self No Yes   Sig: Take 1 tablet (81 mg) by mouth daily   atorvastatin (LIPITOR) 20 MG tablet 5/10/2023 at PM Self No Yes   Sig: Take 1.5 tablets (30 mg) by mouth daily   blood glucose (NO BRAND SPECIFIED) lancets standard   No No   Sig: Use to test blood sugar 1 time daily or as directed.   blood glucose (NO BRAND SPECIFIED) test strip   No No   Sig: Use to test blood sugar 1 time daily or as directed.   buPROPion (WELLBUTRIN XL) 150 MG 24 hr tablet 5/11/2023 at AM Self Yes Yes   Sig: Take 150 mg by mouth every morning   calcium " carbonate (OS-LUIS) 1500 (600 Ca) MG tablet 5/10/2023 at Unknown time Self Yes Yes   Sig: Take 600 mg by mouth daily   cariprazine (VRAYLAR) 4.5 MG capsule 5/11/2023 at AM Self Yes Yes   Sig: Take 4.5 mg by mouth daily   cephALEXin (KEFLEX) 500 MG capsule Past Month at course prescribed 5/10/23 not yet started Self No Yes   Sig: Take 1 capsule (500 mg) by mouth 4 times daily for 14 days   doxycycline hyclate (VIBRAMYCIN) 100 MG capsule  at course prescribed 5/10/23 not yet started Self No Yes   Sig: Take 1 capsule (100 mg) by mouth 2 times daily for 30 days   dulaglutide (TRULICITY) 0.75 MG/0.5ML pen  Self No No   Sig: Inject 0.75 mg Subcutaneous every 7 days   Patient not taking: Reported on 3/21/2023   glucose 40 % (400 mg/mL) gel Unknown at PRN, has home supply Self No Yes   Sig: Take 15-30 g by mouth every 15 minutes as needed for low blood sugar   hydrochlorothiazide (HYDRODIURIL) 25 MG tablet 5/11/2023 at AM Self No Yes   Sig: Take 1.5 tablets (37.5 mg) by mouth daily   insulin glargine (LANTUS PEN) 100 UNIT/ML pen 5/10/2023 at PM Self No Yes   Sig: Inject 10 Units Subcutaneous At Bedtime   loperamide (IMODIUM) 2 MG capsule Past Week at PRN Self No Yes   Sig: Take 1 capsule (2 mg) by mouth 4 times daily as needed for diarrhea   losartan (COZAAR) 100 MG tablet 5/11/2023 at AM Self No Yes   Sig: Take 1 tablet (100 mg) by mouth daily   medication given by implanted intrathecal pump  Other Yes No   Sig: continuous Drug # 1: Fentanyl (Sublimaze) - Conc: 2000 mcg/mL - Total Dose / 24 hours: 1561.9 mcg  Drug # 2: Bupivacaine (Marcaine)  - Conc: 20 mg/mL - Total Dose / 24 hours: 15.619 mg  Drug # 3: Morphine (Duramorph or Infumorph)  - Conc: 9 mg/mL - Total Dose / 24 hours: 7.0287 mg    Rate: 0.0325 mL/hr  Pump Reservoir Volume: 40 mL  Outside Clinic & Provider: Dr. Pawan Shaw, [facility]  Last Refill Date: 2/22/2023  Next Refill Date: 4/6/2023  [place] Alarm Date: 4/4/2023  Pump : Medtronic  SynchroMed II    Boluses: Up to 3 per day, 3 minute duration. Lock-out every 6 hours  Fentanyl: 99.9 mcg/dose  Bupivacaine: 0.999 mg/dose  Morphine: 0.4497 mg/dose   metFORMIN (GLUCOPHAGE XR) 500 MG 24 hr tablet 5/11/2023 at AM Self Yes Yes   Sig: Take 2 tablets (1,000 mg) by mouth 2 times daily (with meals)   metoprolol succinate ER (TOPROL XL) 50 MG 24 hr tablet 5/11/2023 at AM Self Yes Yes   Sig: Take 50 mg by mouth daily   metroNIDAZOLE (FLAGYL) 500 MG tablet  at prescribed 5/10/23, not yet started Self No Yes   Sig: Crush and sprinkle light layer over right leg, left leg and right toe wounds daily   modafinil (PROVIGIL) 200 MG tablet 5/10/2023 at AM, just ran out of home supply Self No Yes   Sig: Take 1.5 tablets (300 mg) by mouth daily   multivitamin w/minerals (THERA-VIT-M) tablet 5/11/2023 at AM Self Yes Yes   Sig: Take 1 tablet by mouth daily Men's 50+   nitroGLYcerin (NITROSTAT) 0.4 MG sublingual tablet More than a month at PRN Self Yes Yes   Sig: Place 0.4 mg under the tongue every 5 minutes as needed for chest pain For chest pain place 1 tablet under the tongue every 5 minutes for 3 doses. If symptoms persist 5 minutes after 1st dose call 911.   oxyCODONE-acetaminophen (PERCOCET) 5-325 MG tablet 5/10/2023 at PM Self Yes Yes   Sig: Take 1 tablet by mouth 2 times daily   pregabalin (LYRICA) 100 MG capsule 5/11/2023 at X1 dose Self Yes Yes   Sig: Take 100 mg by mouth Take up to 4 times per day   senna-docusate (SENOKOT-S/PERICOLACE) 8.6-50 MG tablet 5/9/2023 at Unknown time Self Yes Yes   Sig: Take 1-2 tablets by mouth 2 times daily At baseline, Take 1 tablet twice daily. May take second tablet if needed.   tamsulosin (FLOMAX) 0.4 MG capsule Past Week at Unknown time, may be out of home supply Self No Yes   Sig: Take 1 capsule (0.4 mg) by mouth 2 times daily   testosterone (ANDROGEL/TESTIM) 50 MG/5GM (1%) topical gel 5/10/2023 at AM Self Yes Yes   Sig: Place 50 mg of testosterone onto the skin daily    vitamin B-Complex 5/11/2023 at AM Self Yes Yes   Sig: Take 3 tablets by mouth daily   vitamin D3 (CHOLECALCIFEROL) 1.25 MG (67379 UT) capsule 5/10/2023 at AM Self No Yes   Sig: Take 1 capsule (50,000 Units) by mouth every 7 days      Facility-Administered Medications: None       Social History:   Does not smoke/drink. Single. 3 children.  Works as a strategy advisor/ for start up companies.    Social History     Socioeconomic History     Marital status: Single     Spouse name: Not on file     Number of children: 3     Years of education: Not on file     Highest education level: Not on file   Occupational History     Employer: [employer]   Tobacco Use     Smoking status: Never     Smokeless tobacco: Never   Vaping Use     Vaping status: Never Used   Substance and Sexual Activity     Alcohol use: Not Currently     Alcohol/week: 0.0 standard drinks of alcohol     Drug use: Not Currently     Types: Cocaine, Marijuana, Methamphetamines, Opiates, IV, Amphetamines, Barbiturates, Benzodiazepines, Codeine, Fentanyl, Hashish, Hydrocodone, Hydromorphone, LSD, Oxycodone     Sexual activity: Not Currently     Partners: Female     Birth control/protection: Abstinence   Other Topics Concern     Parent/sibling w/ CABG, MI or angioplasty before 65F 55M? No   Social History Narrative    Social Documentation:05/27/2010        Balanced Diet: NO    Calcium intake: 3-4 per day    Caffeine: 2 per day    Exercise:  type of activity NO    Sunscreen: Yes    Seatbelts:  Yes    Self Breast Exam:  No - na    Self Testicular Exam: no    Physical/Emotional/Sexual Abuse: No     Do you feel safe in your environment? Yes        Cholesterol screen up to date: Yes    Eye Exam up to date: Yes    Dental Exam up to date: Yes    Pap smear up to date: Does Not Apply    Mammogram up to date: Does Not Apply    Dexa Scan up to date:NO    Colonoscopy up to date:2007    Immunizations up to date: YES    Glucose screen if over 40: Yes        Sofi  SHALINI Rosas                 Social Determinants of Health     Financial Resource Strain: Low Risk  (5/13/2020)    Overall Financial Resource Strain (CARDIA)      Difficulty of Paying Living Expenses: Not very hard   Food Insecurity: No Food Insecurity (5/13/2020)    Hunger Vital Sign      Worried About Running Out of Food in the Last Year: Never true      Ran Out of Food in the Last Year: Never true   Transportation Needs: No Transportation Needs (5/13/2020)    PRAPARE - Transportation      Lack of Transportation (Medical): No      Lack of Transportation (Non-Medical): No   Physical Activity: Unknown (5/13/2020)    Exercise Vital Sign      Days of Exercise per Week: 1 day      Minutes of Exercise per Session: Not on file   Stress: Not on file   Social Connections: Not on file   Intimate Partner Violence: Not At Risk (3/17/2023)    Humiliation, Afraid, Rape, and Kick questionnaire      Fear of Current or Ex-Partner: No      Emotionally Abused: No      Physically Abused: No      Sexually Abused: No   Housing Stability: Not on file       Family History:   Reviewed.  Family History   Problem Relation Age of Onset     Cerebrovascular Disease Mother      C.A.D. Mother      Hypertension Mother      Alcohol/Drug Mother      Arthritis Mother      Cerebrovascular Disease Maternal Grandmother      C.A.D. Maternal Grandmother      Alcohol/Drug Maternal Grandmother      C.A.D. Father      Heart Disease Father      Hypertension Father      Alcohol/Drug Father      Allergies Father      Circulatory Father      Depression Father      Respiratory Father      Asthma Father      Alcohol/Drug Paternal Grandfather      Cerebrovascular Disease Paternal Grandfather      Depression Son      Psychotic Disorder Son         anxiety disorder     Depression Daughter      Cerebrovascular Disease Maternal Grandfather      Cerebrovascular Disease Paternal Grandmother      Diabetes Brother      Allergies Sister      Depression Sister       "Gynecology Sister        Review of Systems:   As noted in the HPI; otherwise 10 point review of systems was negative.     Physical Exam:   VITALS:  Blood pressure (!) 161/100, pulse 79, temperature 97.4  F (36.3  C), temperature source Temporal, resp. rate 20, height 1.676 m (5' 6\"), weight 107.5 kg (237 lb), SpO2 94 %.  General: Awake, alert, oriented, pleasant.  HEENT: Pupils equal round and reactive, no scleral icterus or Injection.  Neck: No bruits, JVD or adenopathy.  Heart/Chest: Regular rate and rhythm without murmurs, rubs or gallops.  Lungs: Diminished in the bases, no crackles or wheezes.  Abdomen: Soft, nontender, nondistended, possible sounds.  Extremities/Musculoskeletal: Bilateral lower extremity swelling with erythema with warmth.  Tender to palpation.  Left lateral leg wound which is covered.  Right great toe wound without drainage or discharge.  Please see the images.  Skin:    Neurologic:                   Labs, Imaging & Other Data:     Results for orders placed or performed during the hospital encounter of 05/11/23   Comprehensive metabolic panel     Status: Abnormal   Result Value Ref Range    Sodium 137 136 - 145 mmol/L    Potassium 3.4 3.4 - 5.3 mmol/L    Chloride 98 98 - 107 mmol/L    Carbon Dioxide (CO2) 24 22 - 29 mmol/L    Anion Gap 15 7 - 15 mmol/L    Urea Nitrogen 15.6 8.0 - 23.0 mg/dL    Creatinine 0.86 0.67 - 1.17 mg/dL    Calcium 9.8 8.8 - 10.2 mg/dL    Glucose 237 (H) 70 - 99 mg/dL    Alkaline Phosphatase 119 40 - 129 U/L    AST 29 10 - 50 U/L    ALT 47 10 - 50 U/L    Protein Total 6.8 6.4 - 8.3 g/dL    Albumin 4.3 3.5 - 5.2 g/dL    Bilirubin Total 0.5 <=1.2 mg/dL    GFR Estimate >90 >60 mL/min/1.73m2   [place] Draw     Status: None    Narrative    The following orders were created for panel order [place] Draw.  Procedure                               Abnormality         Status                     ---------                               -----------         ------                   "   Extra Blood Culture Bottle[[id number]]                       Final result               Extra Blue Top Tube[[id number]]                              Final result               Extra Red Top Tube[[id number]]                               Final result                 Please view results for these tests on the individual orders.   CRP inflammation     Status: Abnormal   Result Value Ref Range    CRP Inflammation 26.69 (H) <5.00 mg/L   Procalcitonin     Status: Abnormal   Result Value Ref Range    Procalcitonin 0.16 (H) <0.05 ng/mL   CBC with platelets and differential     Status: Abnormal   Result Value Ref Range    WBC Count 7.5 4.0 - 11.0 10e3/uL    RBC Count 4.37 (L) 4.40 - 5.90 10e6/uL    Hemoglobin 14.4 13.3 - 17.7 g/dL    Hematocrit 39.6 (L) 40.0 - 53.0 %    MCV 91 78 - 100 fL    MCH 33.0 26.5 - 33.0 pg    MCHC 36.4 31.5 - 36.5 g/dL    RDW 13.2 10.0 - 15.0 %    Platelet Count 158 150 - 450 10e3/uL    % Neutrophils 78 %    % Lymphocytes 12 %    % Monocytes 6 %    % Eosinophils 3 %    % Basophils 1 %    % Immature Granulocytes 0 %    NRBCs per 100 WBC 0 <1 /100    Absolute Neutrophils 5.8 1.6 - 8.3 10e3/uL    Absolute Lymphocytes 0.9 0.8 - 5.3 10e3/uL    Absolute Monocytes 0.4 0.0 - 1.3 10e3/uL    Absolute Eosinophils 0.3 0.0 - 0.7 10e3/uL    Absolute Basophils 0.1 0.0 - 0.2 10e3/uL    Absolute Immature Granulocytes 0.0 <=0.4 10e3/uL    Absolute NRBCs 0.0 10e3/uL   Extra Blood Culture Bottle     Status: None   Result Value Ref Range    Hold Specimen JIC    Extra Blue Top Tube     Status: None   Result Value Ref Range    Hold Specimen JIC    Extra Red Top Tube     Status: None   Result Value Ref Range    Hold Specimen JIC    CBC with platelets + differential     Status: Abnormal    Narrative    The following orders were created for panel order CBC with platelets + differential.  Procedure                               Abnormality         Status                     ---------                               -----------          ------                     CBC with platelets and d...[[id number]]  Abnormal            Final result                 Please view results for these tests on the individual orders.         ASSESSMENT & PLAN:   Mr. 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    On initial evaluation, patient was afebrile, hypertensive.  Labs showed CBC with normal white count; BMP was normal; CRP 26.69; procalcitonin 0.16.  He was given IV cefazolin.  Request for admission was made.      Bilateral lower extremity cellulitis.  Bilateral lower extremity chronic lymphedema.  Bilateral lower extremity chronic leg wounds.  * Follows with Dr. Stanford, last seen and toe wound trimmed/debrided 5/10/2023. PTA recently prescribed cephalexin and doxycycline.  * Initial presentation as above.  Blood cultures obtained in the ED.  Started on cefazolin in the ED.  - Continue cefazolin.  - Hold PTA cephalexin and doxycyline for now.  - Continue PTA topical metronidazole (crushed tablet) to right leg, left leg and right toe wounds daily.  - Keep bilateral lower extremities elevated.  - WOC RN consult for wound cares.    DM2 with neuropathy.   [PTA: dulaglutide 0.75 mg subcutaneous q7d (pt not taking); glargine 10U at bedtime; metformin ER 1000 mg BID.]  * Hgb A1C 9.3 2/2023.  - Order moderate carbohydrate diet.  - Continue prior to admission glargine, metformin.  - Order medium aspart insulin correction scale.  - Hold prior to admission dulaglutide for now as patient is not apparently taking.    HTN.  CHF (diastolic).   CAD. S/p GREG stent in  2021.  [PTA: HCTZ 37.5 mg daily; losartan 100 mg daily; metoprolol ER 50 mg daily.]  * Echo 2021 showed LVEF 55-60%, mild LVH; RV OK.  - Continue PTA aspirin, atorvastatin, hydrochlorothiazide, losartan, metoprolol ER.  - Monitor i/o's, daily wts.    Bipolar 1 d/o.  - Continue bupropion, cariprazine.    Chronic spine/disc disease with chronic pain with intrathecal pain med pump.  RLS.  Fibromyalgia.  * Follows with Dr. Shaw, [facility]. Has intrathecal pump in situ containing fentanyl, bupivacaine and morphine.  - Continue PTA intrathecal pump with fentanyl, bupivacaine and morphine.  - Continue duloxetine, pregabalin PRN oxycodone-acetaminophen.    NORMA.   * Previously noted that pt does not use CPAP; reportedly due to back pain, he sleeps in a recliner and is not able to use CPAP.   - Continue modafinil.  - Follow-up outpatient.    Amyloidosis, AL type.   * S/p autologous BM tx 2016. Follows with Dr. Fonseca [facility].  - Noted.    MEJIAS.  * LFT's normal on admit.  - Noted.  - Continue to monitor clinically.    Prophylaxis.  - PCD's, ambulation.    CODE STATUS: FULL      Jonah Johnson Jr., MD  [phone] (p)  Text Page  [place]

## 2023-05-11 NOTE — TELEPHONE ENCOUNTER
One month ago pt was seen in theED for his cellulitis. He went to the wound clinic yesterday.. It is Back in both legs. Two nurses had to wrap his legs and it took over 30 minutes. States that it is very painful. The wound clinic asked the pt if he feels comfortable doing the wraps at home, and he does not. Please see clinic from yesterday.       Pt would like to get a PA for to be admitted into the hospital. Pt has checked with his insurance and stated that if he gets the PA from his provider it would be covered.    Please call pt back ASAP, thanks    Janine Rice RN  [facility]

## 2023-05-11 NOTE — ED NOTES
[facility]  ED Nurse Handoff Report    ED Chief complaint: Wound Check      ED Diagnosis:   Final diagnoses:   Bilateral lower leg cellulitis       Code Status: Full    Allergies:   Allergies   Allergen Reactions     Cephalexin Diarrhea     Liraglutide      Increased migraine frequency and severity     Liraglutide Other (See Comments), Headache and Unknown     Other reaction(s): Headache, Unknown  PN: migraines - Increased migraine frequency and severity       Sulfa Antibiotics Angioedema and Swelling     Pt has taken  Taken sulfa drugs orally without trouble. He had problems with Sulfa eye drops. Eye swelled up         Patient Story:   Pt comes in for eval of lower leg wounds.    Focused Assessment:    Neuro: Alert, oriented x 4, can make needs known  Respiratory:Clear lung sounds, on room air   Cardiology:    Gastrointestinal: soft, non tender, non distended   Genitourinary/Renal:    Musculoskeletal: moves all extremities   Skin: Intact skin   Lines:    Labs Ordered and Resulted from Time of ED Arrival to Time of ED Departure   COMPREHENSIVE METABOLIC PANEL - Abnormal       Result Value    Sodium 137      Potassium 3.4      Chloride 98      Carbon Dioxide (CO2) 24      Anion Gap 15      Urea Nitrogen 15.6      Creatinine 0.86      Calcium 9.8      Glucose 237 (*)     Alkaline Phosphatase 119      AST 29      ALT 47      Protein Total 6.8      Albumin 4.3      Bilirubin Total 0.5      GFR Estimate >90     CRP INFLAMMATION - Abnormal    CRP Inflammation 26.69 (*)    PROCALCITONIN - Abnormal    Procalcitonin 0.16 (*)    CBC WITH PLATELETS AND DIFFERENTIAL - Abnormal    WBC Count 7.5      RBC Count 4.37 (*)     Hemoglobin 14.4      Hematocrit 39.6 (*)     MCV 91      MCH 33.0      MCHC 36.4      RDW 13.2      Platelet Count 158      % Neutrophils 78      % Lymphocytes 12      % Monocytes 6      % Eosinophils 3      % Basophils 1      % Immature Granulocytes 0      NRBCs per 100 WBC 0      Absolute  Neutrophils 5.8      Absolute Lymphocytes 0.9      Absolute Monocytes 0.4      Absolute Eosinophils 0.3      Absolute Basophils 0.1      Absolute Immature Granulocytes 0.0      Absolute NRBCs 0.0          No orders to display         Treatments and/or interventions provided:      Medications   lidocaine 1 % 0.1-1 mL (has no administration in time range)   lidocaine (LMX4) cream (has no administration in time range)   sodium chloride (PF) 0.9% PF flush 3 mL (has no administration in time range)   sodium chloride (PF) 0.9% PF flush 3 mL (has no administration in time range)   acetaminophen (TYLENOL) tablet 650 mg (has no administration in time range)     Or   acetaminophen (TYLENOL) Suppository 650 mg (has no administration in time range)   melatonin tablet 1 mg (has no administration in time range)   senna-docusate (SENOKOT-S/PERICOLACE) 8.6-50 MG per tablet 1 tablet (has no administration in time range)     Or   senna-docusate (SENOKOT-S/PERICOLACE) 8.6-50 MG per tablet 2 tablet (has no administration in time range)   polyethylene glycol (MIRALAX) Packet 17 g (has no administration in time range)   ondansetron (ZOFRAN ODT) ODT tab 4 mg (has no administration in time range)     Or   ondansetron (ZOFRAN) injection 4 mg (has no administration in time range)   prochlorperazine (COMPAZINE) injection 5 mg (has no administration in time range)     Or   prochlorperazine (COMPAZINE) tablet 5 mg (has no administration in time range)     Or   prochlorperazine (COMPAZINE) suppository 12.5 mg (has no administration in time range)   ceFAZolin (ANCEF) 2 g in 100 mL D5W intermittent infusion (0 g Intravenous Stopped 5/11/23 6710)   oxyCODONE (ROXICODONE) tablet 10 mg (10 mg Oral $Given 5/11/23 5011)        Patient's response to treatments and/or interventions:   Resting comfortably    To be done/followed up on inpatient unit:    See any in-patient orders    Does this patient have any cognitive concerns?: None    Activity level -  "Baseline/Home:    Independent    Activity Level - Current:     Independent    Patient's Preferred language: English     Needed?: No    Isolation: None  Infection: Not Applicable  Patient tested for COVID 19 prior to admission: No    Bariatric?: No    Vital Signs:   Vitals:    05/11/23 1413   BP: (!) 161/100   Pulse: 79   Resp: 20   Temp: 97.4  F (36.3  C)   TempSrc: Temporal   SpO2: 94%   Weight: 107.5 kg (237 lb)   Height: 1.676 m (5' 6\")       Cardiac Rhythm:     Was the PSS-3 completed:   Yes  What interventions are required if any?               Family Comments: None  OBS brochure/video discussed/provided to patient/family: N/A              Name of person given brochure if not patient:              Relationship to patient:    For the majority of the shift this patient's behavior was Green.   Behavioral interventions performed were None    ED NURSE PHONE NUMBER: *[phone]        "

## 2023-05-11 NOTE — ED NOTES
Rapid Assessment Note    History:   Patient presents with one week of bilateral leg pain with associated left leg silver dollar wound and right leg dime size wound. He notes increased redness and swelling for four days. He notes he was sent by Dr. Garcia. He was seen 1 month ago for cellulitis right leg. He notes he was admitted for six days and discharged on Keflex. He denies fever. He notes he saw Dr. Stanford at wound care and had his legs wrapped yesterday.     Exam:   General: Patient in mild distress.  Alert and cooperative with exam. Normal mentation  HEENT: NC/AT. Conjunctiva without injection or scleral icterus. External ears normal.  Respiratory: Breathing comfortably on room air  CV: Normal rate, all extremities well perfused  GI:  Non-distended abdomen  Skin: Warm, dry, dressings to lower extremities bilaterally with venous stasis changes  Musculoskeletal: No obvious deformities  Neuro: Alert, answers questions appropriately. No gross motor deficits    Plan of Care:   I evaluated the patient and developed an initial plan of care. I discussed this plan and explained that I, or one of my partners, would be returning to complete the evaluation.     I, Princess Callejas, am serving as a scribe to document services personally performed by dru Dia DO based on my observations and the provider's statements to me.    11/5/2018  [employer]    Portions of this medical record were completed by a scribe. UPON MY REVIEW AND AUTHENTICATION BY ELECTRONIC SIGNATURE, this confirms (a) I performed the applicable clinical services, and (b) the record is accurate.      Dru Dia DO  05/11/23 3295

## 2023-05-12 LAB
ANION GAP SERPL CALCULATED.3IONS-SCNC: 11 MMOL/L (ref 7–15)
BASOPHILS # BLD AUTO: 0.1 10E3/UL (ref 0–0.2)
BASOPHILS NFR BLD AUTO: 1 %
BUN SERPL-MCNC: 16.1 MG/DL (ref 8–23)
CALCIUM SERPL-MCNC: 8.9 MG/DL (ref 8.8–10.2)
CHLORIDE SERPL-SCNC: 95 MMOL/L (ref 98–107)
CREAT SERPL-MCNC: 0.85 MG/DL (ref 0.67–1.17)
DEPRECATED HCO3 PLAS-SCNC: 27 MMOL/L (ref 22–29)
EOSINOPHIL # BLD AUTO: 0.2 10E3/UL (ref 0–0.7)
EOSINOPHIL NFR BLD AUTO: 4 %
ERYTHROCYTE [DISTWIDTH] IN BLOOD BY AUTOMATED COUNT: 13.5 % (ref 10–15)
GFR SERPL CREATININE-BSD FRML MDRD: >90 ML/MIN/1.73M2
GLUCOSE BLDC GLUCOMTR-MCNC: 198 MG/DL (ref 70–99)
GLUCOSE BLDC GLUCOMTR-MCNC: 212 MG/DL (ref 70–99)
GLUCOSE BLDC GLUCOMTR-MCNC: 251 MG/DL (ref 70–99)
GLUCOSE BLDC GLUCOMTR-MCNC: 256 MG/DL (ref 70–99)
GLUCOSE BLDC GLUCOMTR-MCNC: 263 MG/DL (ref 70–99)
GLUCOSE BLDC GLUCOMTR-MCNC: 283 MG/DL (ref 70–99)
GLUCOSE BLDC GLUCOMTR-MCNC: 292 MG/DL (ref 70–99)
GLUCOSE SERPL-MCNC: 267 MG/DL (ref 70–99)
HBA1C MFR BLD: 8.8 %
HCT VFR BLD AUTO: 39.2 % (ref 40–53)
HGB BLD-MCNC: 13.8 G/DL (ref 13.3–17.7)
IMM GRANULOCYTES # BLD: 0 10E3/UL
IMM GRANULOCYTES NFR BLD: 0 %
LYMPHOCYTES # BLD AUTO: 0.8 10E3/UL (ref 0.8–5.3)
LYMPHOCYTES NFR BLD AUTO: 13 %
MCH RBC QN AUTO: 32.3 PG (ref 26.5–33)
MCHC RBC AUTO-ENTMCNC: 35.2 G/DL (ref 31.5–36.5)
MCV RBC AUTO: 92 FL (ref 78–100)
MONOCYTES # BLD AUTO: 0.4 10E3/UL (ref 0–1.3)
MONOCYTES NFR BLD AUTO: 7 %
NEUTROPHILS # BLD AUTO: 4.6 10E3/UL (ref 1.6–8.3)
NEUTROPHILS NFR BLD AUTO: 75 %
NRBC # BLD AUTO: 0 10E3/UL
NRBC BLD AUTO-RTO: 0 /100
PLATELET # BLD AUTO: 148 10E3/UL (ref 150–450)
POTASSIUM SERPL-SCNC: 3.4 MMOL/L (ref 3.4–5.3)
RBC # BLD AUTO: 4.27 10E6/UL (ref 4.4–5.9)
SODIUM SERPL-SCNC: 133 MMOL/L (ref 136–145)
WBC # BLD AUTO: 6.1 10E3/UL (ref 4–11)

## 2023-05-12 PROCEDURE — 84403 ASSAY OF TOTAL TESTOSTERONE: CPT

## 2023-05-12 PROCEDURE — 36415 COLL VENOUS BLD VENIPUNCTURE: CPT

## 2023-05-12 PROCEDURE — 250N000013 HC RX MED GY IP 250 OP 250 PS 637: Performed by: INTERNAL MEDICINE

## 2023-05-12 PROCEDURE — 85004 AUTOMATED DIFF WBC COUNT: CPT | Performed by: INTERNAL MEDICINE

## 2023-05-12 PROCEDURE — 83036 HEMOGLOBIN GLYCOSYLATED A1C: CPT

## 2023-05-12 PROCEDURE — 120N000001 HC R&B MED SURG/OB

## 2023-05-12 PROCEDURE — G0463 HOSPITAL OUTPT CLINIC VISIT: HCPCS

## 2023-05-12 PROCEDURE — 36415 COLL VENOUS BLD VENIPUNCTURE: CPT | Performed by: INTERNAL MEDICINE

## 2023-05-12 PROCEDURE — 80048 BASIC METABOLIC PNL TOTAL CA: CPT | Performed by: INTERNAL MEDICINE

## 2023-05-12 PROCEDURE — 250N000012 HC RX MED GY IP 250 OP 636 PS 637: Performed by: INTERNAL MEDICINE

## 2023-05-12 PROCEDURE — 250N000011 HC RX IP 250 OP 636: Performed by: INTERNAL MEDICINE

## 2023-05-12 RX ADMIN — CARIPRAZINE 4.5 MG: 4.5 CAPSULE, GELATIN COATED ORAL at 10:33

## 2023-05-12 RX ADMIN — TAMSULOSIN HYDROCHLORIDE 0.4 MG: 0.4 CAPSULE ORAL at 08:31

## 2023-05-12 RX ADMIN — MULTIPLE VITAMINS W/ MINERALS TAB 1 TABLET: TAB at 08:31

## 2023-05-12 RX ADMIN — PREGABALIN 100 MG: 100 CAPSULE ORAL at 21:40

## 2023-05-12 RX ADMIN — HYDROCHLOROTHIAZIDE 37.5 MG: 25 TABLET ORAL at 10:33

## 2023-05-12 RX ADMIN — OXYCODONE HYDROCHLORIDE AND ACETAMINOPHEN 1 TABLET: 5; 325 TABLET ORAL at 21:40

## 2023-05-12 RX ADMIN — PREGABALIN 100 MG: 100 CAPSULE ORAL at 08:32

## 2023-05-12 RX ADMIN — CEFAZOLIN SODIUM 2 G: 2 INJECTION, SOLUTION INTRAVENOUS at 06:08

## 2023-05-12 RX ADMIN — B-COMPLEX W/ C & FOLIC ACID TAB 3 TABLET: TAB at 08:31

## 2023-05-12 RX ADMIN — CEFAZOLIN SODIUM 2 G: 2 INJECTION, SOLUTION INTRAVENOUS at 14:39

## 2023-05-12 RX ADMIN — METOPROLOL SUCCINATE 50 MG: 50 TABLET, EXTENDED RELEASE ORAL at 08:31

## 2023-05-12 RX ADMIN — METFORMIN ER 500 MG 1000 MG: 500 TABLET ORAL at 17:57

## 2023-05-12 RX ADMIN — OXYCODONE HYDROCHLORIDE AND ACETAMINOPHEN 1 TABLET: 5; 325 TABLET ORAL at 08:31

## 2023-05-12 RX ADMIN — ACETAMINOPHEN 650 MG: 325 TABLET ORAL at 12:19

## 2023-05-12 RX ADMIN — METFORMIN ER 500 MG 1000 MG: 500 TABLET ORAL at 08:31

## 2023-05-12 RX ADMIN — ATORVASTATIN CALCIUM 30 MG: 20 TABLET, FILM COATED ORAL at 21:41

## 2023-05-12 RX ADMIN — BUPROPION HYDROCHLORIDE 150 MG: 150 TABLET, FILM COATED, EXTENDED RELEASE ORAL at 08:32

## 2023-05-12 RX ADMIN — INSULIN GLARGINE 10 UNITS: 100 INJECTION, SOLUTION SUBCUTANEOUS at 21:41

## 2023-05-12 RX ADMIN — INSULIN GLARGINE 10 UNITS: 100 INJECTION, SOLUTION SUBCUTANEOUS at 00:46

## 2023-05-12 RX ADMIN — ASPIRIN 81 MG: 81 TABLET, COATED ORAL at 08:31

## 2023-05-12 RX ADMIN — Medication: at 08:32

## 2023-05-12 RX ADMIN — CEFAZOLIN SODIUM 2 G: 2 INJECTION, SOLUTION INTRAVENOUS at 21:42

## 2023-05-12 RX ADMIN — INSULIN ASPART 2 UNITS: 100 INJECTION, SOLUTION INTRAVENOUS; SUBCUTANEOUS at 21:41

## 2023-05-12 RX ADMIN — TAMSULOSIN HYDROCHLORIDE 0.4 MG: 0.4 CAPSULE ORAL at 21:41

## 2023-05-12 RX ADMIN — MODAFINIL 300 MG: 200 TABLET ORAL at 10:33

## 2023-05-12 RX ADMIN — ACETAMINOPHEN 650 MG: 325 TABLET ORAL at 03:50

## 2023-05-12 RX ADMIN — PREGABALIN 100 MG: 100 CAPSULE ORAL at 16:01

## 2023-05-12 RX ADMIN — DULOXETINE HYDROCHLORIDE 30 MG: 30 CAPSULE, DELAYED RELEASE ORAL at 21:41

## 2023-05-12 RX ADMIN — DULOXETINE HYDROCHLORIDE 30 MG: 30 CAPSULE, DELAYED RELEASE ORAL at 08:32

## 2023-05-12 RX ADMIN — TESTOSTERONE 50 MG OF TESTOSTERONE: 50 GEL TOPICAL at 10:34

## 2023-05-12 RX ADMIN — INSULIN ASPART 2 UNITS: 100 INJECTION, SOLUTION INTRAVENOUS; SUBCUTANEOUS at 00:50

## 2023-05-12 RX ADMIN — LOSARTAN POTASSIUM 100 MG: 100 TABLET, FILM COATED ORAL at 08:32

## 2023-05-12 RX ADMIN — Medication 600 MG: at 08:32

## 2023-05-12 ASSESSMENT — ACTIVITIES OF DAILY LIVING (ADL)
ADLS_ACUITY_SCORE: 35

## 2023-05-12 NOTE — PLAN OF CARE
Goal Outcome Evaluation:    Shift: [id number]    Dx: Bilateral lower leg cellulitis   DOA: 5/11/023  Mental Statues: A&O X4  VS/O2: VSS on RA  Activity: Independent   Diet: mod carb   Bowel/bladder: Voiding in bathroom   Skin: Redness to BLE. CMS expect baseline numbness to BLE   Pain: PRN Tylenol   LDAs: PIV saline locked  Discharge: Pending

## 2023-05-12 NOTE — PROGRESS NOTES
"Came to the floor past 10PM . Alert and oriented x 4. 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 Can be verbally aggressive, swearing and yelling at the staff. Independent in room. Available meds administered as ordered.  Unavailable requested. Incoming RN aware.   "

## 2023-05-12 NOTE — PHARMACY-ADMISSION MEDICATION HISTORY
"Pharmacist Admission Medication History    Admission medication history is complete. The information provided in this note is only as accurate as the sources available at the time of the update.    Medication reconciliation/reorder completed by provider prior to medication history? No    Information Source(s): Patient and CareEverywhere/SureScripts via in-person    Pertinent Information: Patient is a reliable historian.   --Rosalia-C on hold via [facility] records in martha on patient's phone, patient states taking separate vitamin C product.   --Trulicity prescribed 3/2023; patient confirms still not taking, taking Lantus instead from previous hospitalization.   --Folic acid-B vitamin tablet on hold via [facility] records in martha on patient's phone, patient states taking other B complex product (added separately to PTA med list).   --vitamin D: fluctuates the day of the week to administer dose, but typically takes on the weekend.   --atorvastatin based on fill history would indicate dose of 20 mg daily, but patient confirmed dose has since increased.   --patient admits to taking Lantus \"90% of the time\" at home.   --patient is out of modafinil and potentially tamsulosin; this is reflected in fill history.   --patient generally takes pregabalin 3 doses/day, takes fourth daily dose for breakthrough.   --patient estimated dose of 5 mg testosterone in interview; suspect this was for a 5 gram dose (50 mg/5 gm) so did not adjust on PTA med list.     --patient has not started any medications prescribed 5/10/23.    Addendum: Pain pump settings confirmed with [facility].     Changes made to PTA medication list:    Added: potassium, calcium, neural supplement     Deleted: none    Changed: added separate B complex (folic acid-B complex on hold, adjusted dose), updated metoprolol succinate tablet strength, Percocet 2 tablets --> 1 tablet BID, senna-docusate 1 tab AM/2 tab PM/up to 4 per day --> 1 BID/2nd tab BID PRN    Medication " Affordability:  Not including over the counter (OTC) medications, was there a time in the past 3 months when you did not take your medications as prescribed because of cost?: No (cost has not prevented filling medications, but patient interested in pursuing programs to lower copays for certain medications if possible). Mentioned Vraylar, Lyrica specifically in interview.     Allergies reviewed with patient and updates made in EHR: yes    Medication History Completed By: Rhoda Gordon [facility] 5/11/2023 7:16 PM    Medication Sig Last Dose Taking? Auth Provider   acetaminophen (TYLENOL) 500 MG tablet Take 1,000 mg by mouth every 8 hours as needed 5/11/2023 at AM Yes Reported, Patient   ammonium lactate (AMLACTIN) 12 % external cream Apply topically daily To feet and legs daily & as needed Unknown at prescribed 5/10/23, not yet started Yes Regino Stanford MD   aspirin (ASPIRIN LOW DOSE) 81 MG tablet Take 1 tablet (81 mg) by mouth daily 5/11/2023 at AM Yes Vince Henry MD   atorvastatin (LIPITOR) 20 MG tablet Take 1.5 tablets (30 mg) by mouth daily 5/10/2023 at PM Yes Vince Henry MD   Bioflavonoid Products (CANDIDO-C) TABS Take 1,000 mg by mouth daily 5/9/2023 at AM Yes Regino Stanford MD   buPROPion (WELLBUTRIN XL) 150 MG 24 hr tablet Take 150 mg by mouth every morning 5/11/2023 at AM Yes Unknown, Entered By History   calcium carbonate (OS-LUIS) 1500 (600 Ca) MG tablet Take 600 mg by mouth daily 5/10/2023 at Unknown time Yes Unknown, Entered By History   cariprazine (VRAYLAR) 4.5 MG capsule Take 4.5 mg by mouth daily 5/11/2023 at AM Yes Unknown, Entered By History   cephALEXin (KEFLEX) 500 MG capsule Take 1 capsule (500 mg) by mouth 4 times daily for 14 days Past Month at course prescribed 5/10/23 not yet started Yes Regino Stanford MD   doxycycline hyclate (VIBRAMYCIN) 100 MG capsule Take 1 capsule (100 mg) by mouth 2 times daily for 30 days  at course prescribed 5/10/23 not yet started Yes Hien  Regino PICKERING MD   DULoxetine (CYMBALTA) 30 MG capsule Take 30 mg by mouth 2 times daily 5/11/2023 at AM Yes Reported, Patient   glucose 40 % (400 mg/mL) gel Take 15-30 g by mouth every 15 minutes as needed for low blood sugar Unknown at PRN, has home supply Yes Dashawn Franklin DO   hydrochlorothiazide (HYDRODIURIL) 25 MG tablet Take 1.5 tablets (37.5 mg) by mouth daily 5/11/2023 at AM Yes Maribeth Ardon MD   insulin glargine (LANTUS PEN) 100 UNIT/ML pen Inject 10 Units Subcutaneous At Bedtime 5/10/2023 at PM Yes Dashawn Franklin DO   loperamide (IMODIUM) 2 MG capsule Take 1 capsule (2 mg) by mouth 4 times daily as needed for diarrhea Past Week at PRN Yes Dashawn Franklin DO   losartan (COZAAR) 100 MG tablet Take 1 tablet (100 mg) by mouth daily 5/11/2023 at AM Yes Rodrick Ferrer MD   metFORMIN (GLUCOPHAGE XR) 500 MG 24 hr tablet Take 2 tablets (1,000 mg) by mouth 2 times daily (with meals) 5/11/2023 at AM Yes Maribeth Ardon MD   metoprolol succinate ER (TOPROL XL) 50 MG 24 hr tablet Take 50 mg by mouth daily 5/11/2023 at AM Yes Unknown, Entered By History   metroNIDAZOLE (FLAGYL) 500 MG tablet Crush and sprinkle light layer over right leg, left leg and right toe wounds daily  at prescribed 5/10/23, not yet started Yes Regino Stanford MD   modafinil (PROVIGIL) 200 MG tablet Take 1.5 tablets (300 mg) by mouth daily 5/10/2023 at AM, just ran out of home supply Yes Sherwin Mejia, DO   multivitamin w/minerals (THERA-VIT-M) tablet Take 1 tablet by mouth daily Men's 50+ 5/11/2023 at AM Yes Unknown, Entered By History   nitroGLYcerin (NITROSTAT) 0.4 MG sublingual tablet Place 0.4 mg under the tongue every 5 minutes as needed for chest pain For chest pain place 1 tablet under the tongue every 5 minutes for 3 doses. If symptoms persist 5 minutes after 1st dose call 911. More than a month at [facility] Yes Unknown, Entered By History   OVER-THE-COUNTER Take 3 capsules by mouth daily Supplement for  "brain health, unknown brand/ingredients 5/10/2023 at AM Yes Unknown, Entered By History   oxyCODONE-acetaminophen (PERCOCET) 5-325 MG tablet Take 1 tablet by mouth 2 times daily 5/10/2023 at PM Yes Unknown, Entered By History   POTASSIUM PO Take 1 tablet by mouth daily Unspecified tablet strength; patient estimated \"600 mg, +/- 200 mg\", takes for leg cramps 5/10/2023 at AM Yes Unknown, Entered By History   pregabalin (LYRICA) 100 MG capsule Take 100 mg by mouth Take up to 4 times per day 5/11/2023 at X1 dose Yes Unknown, Entered By History   senna-docusate (SENOKOT-S/PERICOLACE) 8.6-50 MG tablet Take 1-2 tablets by mouth 2 times daily At baseline, Take 1 tablet twice daily. May take second tablet if needed. 5/9/2023 at Unknown time Yes Unknown, Entered By History   SUMAtriptan (IMITREX) 50 MG tablet Take 1 tablet (50 mg) by mouth at onset of headache for migraine May repeat in 2 hours. Max 4 tablets/24 hours. 5/10/2023 at Unknown time Yes Maribeth Ardon MD   tamsulosin (FLOMAX) 0.4 MG capsule Take 1 capsule (0.4 mg) by mouth 2 times daily Past Week at Unknown time, may be out of home supply Yes Vince Henry MD   testosterone (ANDROGEL/TESTIM) 50 MG/5GM (1%) topical gel Place 50 mg of testosterone onto the skin daily 5/10/2023 at AM Yes Unknown, Entered By History   vitamin B-Complex Take 3 tablets by mouth daily 5/11/2023 at AM Yes Unknown, Entered By History   vitamin D3 (CHOLECALCIFEROL) 1.25 MG (67941 UT) capsule Take 1 capsule (50,000 Units) by mouth every 7 days 5/10/2023 at AM Yes Regino Stanford MD   blood glucose (NO BRAND SPECIFIED) lancets standard Use to test blood sugar 1 time daily or as directed.   Vince Henry MD   blood glucose (NO BRAND SPECIFIED) test strip Use to test blood sugar 1 time daily or as directed.   Vince Henry MD   dulaglutide (TRULICITY) 0.75 MG/0.5ML pen Inject 0.75 mg Subcutaneous every 7 days  Patient not taking: Reported on 3/21/2023   Radha " Sherwin Mcdermott,    Folic Acid-Vit B6-Vit B12 0.5-5-0.2 MG TABS Take 1 tablet by mouth daily  at ON HOLD via [facility] records  Regino Stanford MD   medication given by implanted intrathecal pump continuous Drug # 1: Fentanyl (Sublimaze) - Conc: 2000 mcg/mL - Total Dose / 24 hours: 1561.9 mcg  Drug # 2: Bupivacaine (Marcaine)  - Conc: 20 mg/mL - Total Dose / 24 hours: 15.619 mg  Drug # 3: Morphine (Duramorph or Infumorph)  - Conc: 9 mg/mL - Total Dose / 24 hours: 7.0287 mg    Rate: 0.0325 mL/hr  Pump Reservoir Volume: 40 mL  Outside Clinic & Provider: [facility]  Last Refill Date: 4/6/2023  Next Refill Date: 5/17/2023  [place] Alarm Date: 5/15/2023  Pump : Medtronic SynchroMed II    Boluses: Up to 3 per day, 3 minute duration. Lock-out every 6 hours  Fentanyl: 99.9 mcg/dose  Bupivacaine: 0.999 mg/dose  Morphine: 0.4497 mg/dose   Unknown, Entered By History

## 2023-05-12 NOTE — PROGRESS NOTES
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    Patient continues to complain of pain and states there has been no change in that. Areas of erythema that had been outlined have decreased in size today.      Bilateral lower extremity cellulitis.  Bilateral lower extremity chronic lymphedema.  Bilateral lower extremity chronic leg wounds.  * Follows with Dr. Stanford, last seen and toe wound trimmed/debrided 5/10/2023. PTA recently prescribed cephalexin and doxycycline.  * Initial presentation as above.  Blood cultures obtained pending.  Started on cefazolin in the ED.  - Continue cefazolin.  - Hold PTA cephalexin and doxycyline for now.  - Continue PTA topical metronidazole (crushed tablet) to right leg, left leg and right toe wounds daily.  - Keep bilateral lower extremities elevated.  - WOC RN consult for wound cares.     DM2 with neuropathy.   [PTA: dulaglutide 0.75 mg subcutaneous q7d (pt not taking); glargine 10U at bedtime; metformin ER 1000 mg BID.]  * Hgb A1C 9.3 2/2023, repeat today 8.8  - Order moderate carbohydrate diet.  - Continue prior to admission glargine, metformin.  - Order medium aspart insulin correction scale.  - Hold prior to admission dulaglutide for now as patient is not apparently taking.     HTN.  CHF (diastolic).   CAD. S/p GREG stent in 2021.  [PTA: HCTZ 37.5 mg daily; losartan 100 mg daily; metoprolol ER 50 mg daily.]  * Echo 2021 showed LVEF 55-60%, mild  "LVH; RV OK.  - Continue PTA aspirin, atorvastatin, hydrochlorothiazide, losartan, metoprolol ER.  - Monitor i/o's, daily wts.     Bipolar 1 d/o.  - Continue bupropion, cariprazine.     Chronic spine/disc disease with chronic pain with intrathecal pain med pump.  RLS.  Fibromyalgia.  * Follows with Dr. Shaw [facility]. Has intrathecal pump in situ containing fentanyl, bupivacaine and morphine.  - Continue PTA intrathecal pump with fentanyl, bupivacaine and morphine.  - Continue duloxetine, pregabalin PRN oxycodone-acetaminophen.     NORMA.   * Previously noted that pt does not use CPAP; reportedly due to back pain, he sleeps in a recliner and is not able to use CPAP.   - Continue modafinil.  - Follow-up outpatient.     Amyloidosis, AL type.   * S/p autologous BM tx 2016. Follows with BIPIN Padilla.  - Noted.     MEJIAS.  * LFT's normal on admit.  - Noted.  - Continue to monitor clinically.        Diet: Moderate Consistent Carb (60 g CHO per Meal) Diet    DVT Prophylaxis: Pneumatic Compression Devices and Ambulate every shift  Parker Catheter: Not present  Lines: None     Cardiac Monitoring: None  Code Status: Full Code      Clinically Significant Risk Factors Present on Admission                # Drug Induced Platelet Defect: home medication list includes an antiplatelet medication   # Hypertension: Noted on problem list     # DMII: A1C = 8.8 % (Ref range: <5.7 %) within past 6 months    # Obesity: Estimated body mass index is 38.25 kg/m  as calculated from the following:    Height as of this encounter: 1.676 m (5' 6\").    Weight as of this encounter: 107.5 kg (237 lb).            Disposition Plan      Expected Discharge Date: 05/13/2023    Discharge Delays: IV Medication - consider oral or Home Infusion            The patient's care was discussed with the Attending Physician, Dr. Jimenez, Bedside Nurse and Patient.    Avelina Rivera NP  Hospitalist Service  [facility]  Securely message with [employer]evin (more info)  Text page via [employer] Paging/Directory   ______________________________________________________________________    Interval History   No pertinent events reported overnight. Patient states the pain in his legs is unchanged. States that his legs always hurt and this is not different. Decreased areas of erythema of both legs, decreased swelling. Afebrile. Denies other pain or concerns.     Physical Exam   Vital Signs: Temp: 97.5  F (36.4  C) Temp src: Oral BP: 132/84 Pulse: 81   Resp: 17 SpO2: 95 % O2 Device: None (Room air)    Weight: 237 lbs 0 oz    General Appearance: Alert, in NAD  Respiratory: chest symmetric with easy respirations bilaterally. Lung sounds clear and equal.   Cardiovascular: NSR  GI: soft; non-distended and non-tender  Skin: warm dry  Other: Some decrease in erythema and swelling in both lower legs. Open lesion outer aspect right lower leg healing without purulent drainage or erythema. Scabbed lesion right lower lesion healing. Scabbed lesion right great toe unchanged. Distal CMS intact.    Medical Decision Making       30 MINUTES SPENT BY ME on the date of service doing chart review, history, exam, documentation & further activities per the note.      Data     I have personally reviewed the following data over the past 24 hrs:    6.1  \   13.8   / 148 (L)     133 (L) 95 (L) 16.1 /  212 (H)   3.4 27 0.85 \       ALT: N/A AST: N/A AP: N/A TBILI: N/A   ALB: N/A TOT PROTEIN: N/A LIPASE: N/A       TSH: N/A T4: N/A A1C: 8.8 (H)       Procal: N/A CRP: N/A Lactic Acid: N/A         Imaging results reviewed over the past 24 hrs:   No results found for this or any previous visit (from the past 24 hour(s)).

## 2023-05-12 NOTE — PLAN OF CARE
Diagnosis: BLE cellulitis, LLE wound  POD#: NA  Mental Status: A&O x4  Activity/dangle IND  Diet: mod cho  Pain: scheduled percocet/ tylenol PRN  Parker/Voiding: voiding  Tele/Restraints/Iso: NA  02/LDA: SL  D/C Date: ?  Other Info: wound care daily BLE & Q3 days

## 2023-05-12 NOTE — CONSULTS
"[facility] Nurse Inpatient Assessment     Consulted for: Wound right great toe, bilateral legs    Summary: Patient with bilateral cellulitis and edema. Legs are red, shiny, and texture is bumpy. No drainage noted. Patient with chronic wounds to LEs.     Patient History (according to provider note(s):      \"Mr. 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 He has been seen at the [facility] x2 by Dr. Stanford. Patient has been using his own, old wound care supplies to care for this wound at home. Found with black, foam dressing on initial assessment.    Wound base: red granulation tissue at wound edges. Wound bed is mostly viable, moist, marbled red-pink tissue     Palpation of the wound bed: normal      Drainage: small     Description of drainage: serosanguinous, yellow, tan and slimy     Measurements (length x width x depth, in cm): 2.1 x 2.7 x 0.1     Tunneling: N/A     Undermining: N/A  Periwound skin: Edematous      Color: red and shiny      Temperature: normal   Odor: none  Pain: patient reports severe pain to LEs at baseline, shooting and burning  Pain interventions prior to dressing change: patient tolerated well, soaking and slow and gentle cares   Treatment goal: Heal , Drainage control, Infection control/prevention, Increase " "granulation and Protection  STATUS: initial assessment  Supplies ordered: gathered, ordered Vashe and discussed with patient      Wound location: Right great toe        Last photo: 5/12/23  Wound due to: callus  Wound history/plan of care: Per patient, Dr. Stanford treated callus at [facility] two days ago. Patient usually \"trims\" it at home.   Wound base:  Thick, non-viable tissue with dry, sanguinous drainage     Palpation of the wound bed: firm      Drainage: none     Description of drainage: none     Measurements (length x width x depth, in cm): 4  x 1  x  0.1 cm      Tunneling: N/A     Undermining: N/A  Periwound skin: Hyperkeratosis      Color: normal and consistent with surrounding tissue      Temperature: normal   Odor: none  Pain: mild, sharp and burning  Pain interventions prior to dressing change: patient tolerated well and slow and gentle cares   Treatment goal: Infection control/prevention and Protection  STATUS: initial assessment  Supplies ordered: gathered, discussed with RN and discussed with patient     Treatment Plan:     Bilateral lower extremity care: Daily  and PRN  1. Soak Kerlix fluff gauze with Vashe (#[id number]) and cover both legs with the gauze. Let soak for about 10 minutes. This will reduce inflammation in the lower extremities and clean the skin. No need to rinse legs.  2. Apply Sween cream from base of toes to knees, if patient is agreeable.  3. Apply EdemaWear bilaterally when patient's pain has improved. Can remove the 4x4 Mepilex from left leg and apply EdemaWear directly over the Aquacel.    Left lateral lower leg: Every 3 days and PRN for excessive drainage  1. Clease wound bed with MicroKlenz. Using it on stream setting can promote removal of debris from wound bed.  2. Pat dry with gauze fluffs.  3. Cut a piece of Aquacel Ag (#[id number]) slightly larger than wound bed. Aquacel will shrink. Apply to wound bed.  4. Swab cyndy-wound skin with Cavilon no-sting barrier and allow to dry.  5. Cover " "with Mepilex 4x4. Initial and date.    Right great toe: Daily  1. Cleanse with wound cleanser or saline. Pat dry.  2. Paint affected area with betadine. Extend to about 2 cm past wounded skin.    EdemaWear stockings: Use and Care    Rationale for use:     Decreases edema by improving lymphatic and venous function      Safe and gentle compression    Enhances wound healing    Protects skin    General:     EdemaWear can be worn 24/7, but should be removed at least daily for skin inspection and cares      In order to be effective, EdemaWear should DIRECTLY contact the skin as much as possible -- the mesh weave promotes lymphatic drainage when it is pressed into the skin    Choose appropriate size EdemaWear based on leg (or arm) circumference - see table for guidelines    EdemaWear LITE is only for especially fragile or painful skin    Cleanse and moisturize any intact or scaly skin before applying EdemaWear    Ok to apply additional compression over the EdemaWear (ie Lymph wraps)    Application:     Apply the EdemaWear from base of toes to knee, or above knee if tolerated and it is a long stocking    Create a wide 3\" cuff at the top if needed to prevent rolling down    Only trim the stocking if it is excessively long; do NOT cut in half; can often fold over excess length onto foot    When wounds are present:    Wound dressings that directly treat the wound bed can be applied under the EdemaWear    Any additional cover dressings (dry gauze, ABD pads, Kerlix, etc) should be applied ON TOP of the stockings whenever feasible     Stockings may get soiled with drainage and will need to be washed; ensure an extra clean, dry pair always available    May need two people to apply the stocking - bunch up stocking and pull against each other, lifting over wounds    Care:    When stockings are soiled, DO NOT THROW AWAY, hand wash with mild soap, rinse, hang dry    Replace approximately every 4 to 6 months        EdemaWear size Max " "circumference Stripe color PS # # stockings per pack   Small 18\"  (45cm) navy 501552 2   Medium 30\"  (75cm) yellow 921933 1   Large 46\"  (115cm) red [id number] 1   X Large 60\"  (150cm) aqua 360039 1   Small LITE 24\"  (60cm) purple [id number] 2   Medium LITE 36\"  (90cm) orange 294257 1     Patient wears small EdemaWear (navy stripe)    Orders: Written    RECOMMEND PRIMARY TEAM ORDER: None, at this time  Education provided: plan of care, wound progress and Infection prevention   Discussed plan of care with: Patient  WOC nurse follow-up plan: weekly  Notify WOC if wound(s) deteriorate.  Nursing to notify the Provider(s) and re-consult the WOC Nurse if new skin concern.    DATA:     Current support surface: Standard  Standard gel/foam mattress (IsoFlex, Atmos air, etc)  Containment of urine/stool: Continent of bladder and Continent of bowel  BMI: Body mass index is 38.25 kg/m .   Active diet order: Orders Placed This Encounter      Moderate Consistent Carb (60 g CHO per Meal) Diet     Output: No intake/output data recorded.     Labs: Recent Labs   Lab 05/12/23  0634 05/11/23  1432   ALBUMIN  --  4.3   HGB 13.8 14.4   WBC 6.1 7.5     Pressure injury risk assessment:   Sensory Perception: 4-->no impairment  Moisture: 4-->rarely moist  Activity: 4-->walks frequently  Mobility: 4-->no limitation  Nutrition: 4-->excellent  Friction and Shear: 3-->no apparent problem  Eloy Score: 23    Chantel Trejo RN, CWON  Please contact via [employer] at name or group \"WOC nurse\"  Dept. Office Number: [phone]  "

## 2023-05-12 NOTE — PROGRESS NOTES
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    Patient continues to complain of pain and states there has been no change in that. Areas of erythema that had been outlined have decreased in size today.      Bilateral lower extremity cellulitis.  Bilateral lower extremity chronic lymphedema.  Bilateral lower extremity chronic leg wounds.  * Follows with Dr. Stanford, last seen and toe wound trimmed/debrided 5/10/2023. PTA recently prescribed cephalexin and doxycycline.  * Initial presentation as above.  Blood cultures obtained pending.  Started on cefazolin in the ED.  - Continue cefazolin.  - Hold PTA cephalexin and doxycyline for now.  - Continue PTA topical metronidazole (crushed tablet) to right leg, left leg and right toe wounds daily.  - Keep bilateral lower extremities elevated.  - WOC RN consult for wound cares.     DM2 with neuropathy.   [PTA: dulaglutide 0.75 mg subcutaneous q7d (pt not taking); glargine 10U at bedtime; metformin ER 1000 mg BID.]  * Hgb A1C 9.3 2/2023, repeat today 8.8  - Order moderate carbohydrate diet.  - Continue prior to admission glargine, metformin.  - Order medium aspart insulin correction scale.  - Hold prior to admission dulaglutide for now as patient is not apparently taking.     HTN.  CHF (diastolic).   CAD. S/p GREG stent in 2021.  [PTA: HCTZ 37.5 mg daily; losartan 100 mg daily; metoprolol ER 50 mg daily.]  * Echo 2021 showed LVEF 55-60%, mild LVH; RV OK.  - Continue PTA aspirin, atorvastatin, hydrochlorothiazide, losartan, metoprolol ER.  - Monitor i/o's, daily wts.     Bipolar 1  d/o.  - Continue bupropion, cariprazine.     Chronic spine/disc disease with chronic pain with intrathecal pain med pump.  RLS.  Fibromyalgia.  * Follows with [facility]. Has intrathecal pump in situ containing fentanyl, bupivacaine and morphine.  - Continue PTA intrathecal pump with fentanyl, bupivacaine and morphine.  - Continue duloxetine, pregabalin PRN oxycodone-acetaminophen.     NORMA.   * Previously noted that pt does not use CPAP; reportedly due to back pain, he sleeps in a recliner and is not able to use CPAP.   - Continue modafinil.  - Follow-up outpatient.     Amyloidosis, AL type.   * S/p autologous BM tx 2016. Follows with Dr. Fonseca [facility].  - Noted.     MEJIAS.  * LFT's normal on admit.  - Noted.  - Continue to monitor clinically.     Prophylaxis.  - PCD's, ambulation.     CODE STATUS: FULL

## 2023-05-12 NOTE — PROGRESS NOTES
RECEIVING UNIT ED HANDOFF REVIEW    ED Nurse Handoff Report was reviewed by: Abebe Daly, SERGEY on May 11, 2023 at 9:34 PM

## 2023-05-13 LAB
GLUCOSE BLDC GLUCOMTR-MCNC: 194 MG/DL (ref 70–99)
GLUCOSE BLDC GLUCOMTR-MCNC: 203 MG/DL (ref 70–99)
GLUCOSE BLDC GLUCOMTR-MCNC: 215 MG/DL (ref 70–99)
GLUCOSE BLDC GLUCOMTR-MCNC: 217 MG/DL (ref 70–99)
GLUCOSE BLDC GLUCOMTR-MCNC: 224 MG/DL (ref 70–99)

## 2023-05-13 PROCEDURE — 250N000013 HC RX MED GY IP 250 OP 250 PS 637: Performed by: INTERNAL MEDICINE

## 2023-05-13 PROCEDURE — 250N000011 HC RX IP 250 OP 636: Performed by: INTERNAL MEDICINE

## 2023-05-13 PROCEDURE — 120N000001 HC R&B MED SURG/OB

## 2023-05-13 PROCEDURE — 99232 SBSQ HOSP IP/OBS MODERATE 35: CPT | Performed by: STUDENT IN AN ORGANIZED HEALTH CARE EDUCATION/TRAINING PROGRAM

## 2023-05-13 RX ADMIN — Medication 600 MG: at 09:09

## 2023-05-13 RX ADMIN — HYDROCHLOROTHIAZIDE 37.5 MG: 25 TABLET ORAL at 10:36

## 2023-05-13 RX ADMIN — MULTIPLE VITAMINS W/ MINERALS TAB 1 TABLET: TAB at 09:08

## 2023-05-13 RX ADMIN — TESTOSTERONE 50 MG OF TESTOSTERONE: 50 GEL TOPICAL at 14:06

## 2023-05-13 RX ADMIN — METRONIDAZOLE 500 MG: 500 TABLET ORAL at 10:36

## 2023-05-13 RX ADMIN — PREGABALIN 100 MG: 100 CAPSULE ORAL at 21:38

## 2023-05-13 RX ADMIN — BUPROPION HYDROCHLORIDE 150 MG: 150 TABLET, FILM COATED, EXTENDED RELEASE ORAL at 09:09

## 2023-05-13 RX ADMIN — CEFAZOLIN SODIUM 2 G: 2 INJECTION, SOLUTION INTRAVENOUS at 06:12

## 2023-05-13 RX ADMIN — OXYCODONE HYDROCHLORIDE AND ACETAMINOPHEN 1 TABLET: 5; 325 TABLET ORAL at 21:38

## 2023-05-13 RX ADMIN — LOSARTAN POTASSIUM 100 MG: 100 TABLET, FILM COATED ORAL at 09:09

## 2023-05-13 RX ADMIN — ATORVASTATIN CALCIUM 30 MG: 20 TABLET, FILM COATED ORAL at 21:38

## 2023-05-13 RX ADMIN — DULOXETINE HYDROCHLORIDE 30 MG: 30 CAPSULE, DELAYED RELEASE ORAL at 21:38

## 2023-05-13 RX ADMIN — Medication: at 09:14

## 2023-05-13 RX ADMIN — INSULIN ASPART 1 UNITS: 100 INJECTION, SOLUTION INTRAVENOUS; SUBCUTANEOUS at 21:38

## 2023-05-13 RX ADMIN — CARIPRAZINE 4.5 MG: 4.5 CAPSULE, GELATIN COATED ORAL at 10:36

## 2023-05-13 RX ADMIN — TAMSULOSIN HYDROCHLORIDE 0.4 MG: 0.4 CAPSULE ORAL at 21:38

## 2023-05-13 RX ADMIN — CEFAZOLIN SODIUM 2 G: 2 INJECTION, SOLUTION INTRAVENOUS at 14:05

## 2023-05-13 RX ADMIN — CEFAZOLIN SODIUM 2 G: 2 INJECTION, SOLUTION INTRAVENOUS at 21:38

## 2023-05-13 RX ADMIN — OXYCODONE HYDROCHLORIDE AND ACETAMINOPHEN 1 TABLET: 5; 325 TABLET ORAL at 09:09

## 2023-05-13 RX ADMIN — PREGABALIN 100 MG: 100 CAPSULE ORAL at 17:30

## 2023-05-13 RX ADMIN — B-COMPLEX W/ C & FOLIC ACID TAB 3 TABLET: TAB at 10:39

## 2023-05-13 RX ADMIN — TAMSULOSIN HYDROCHLORIDE 0.4 MG: 0.4 CAPSULE ORAL at 09:09

## 2023-05-13 RX ADMIN — METOPROLOL SUCCINATE 50 MG: 50 TABLET, EXTENDED RELEASE ORAL at 09:09

## 2023-05-13 RX ADMIN — MODAFINIL 300 MG: 200 TABLET ORAL at 10:46

## 2023-05-13 RX ADMIN — ACETAMINOPHEN 650 MG: 325 TABLET ORAL at 06:59

## 2023-05-13 RX ADMIN — PREGABALIN 100 MG: 100 CAPSULE ORAL at 09:08

## 2023-05-13 RX ADMIN — DULOXETINE HYDROCHLORIDE 30 MG: 30 CAPSULE, DELAYED RELEASE ORAL at 09:08

## 2023-05-13 RX ADMIN — METFORMIN ER 500 MG 1000 MG: 500 TABLET ORAL at 17:30

## 2023-05-13 RX ADMIN — INSULIN GLARGINE 10 UNITS: 100 INJECTION, SOLUTION SUBCUTANEOUS at 21:48

## 2023-05-13 RX ADMIN — METFORMIN ER 500 MG 1000 MG: 500 TABLET ORAL at 09:08

## 2023-05-13 RX ADMIN — ASPIRIN 81 MG: 81 TABLET, COATED ORAL at 09:09

## 2023-05-13 ASSESSMENT — ACTIVITIES OF DAILY LIVING (ADL)
ADLS_ACUITY_SCORE: 35

## 2023-05-13 NOTE — PLAN OF CARE
Goal Outcome Evaluation:     A/Ox4. VSS on RA. Independent in room. Voiding in BR. CMS intact with baseline neuropathy. BG 0200 224 mg/dl. Pain managed with Tylenol. PIV sl. Will monitor.

## 2023-05-13 NOTE — PLAN OF CARE
VSS, baseline numbness BLE. BLE red/swollen. Dressing changed per plan of care, edema wear applied. Up independently with cane. Pain managed with scheduled percocet. A&OX4. On iv antibiotic.

## 2023-05-13 NOTE — PROGRESS NOTES
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    Patient continues to complain of pain and states there has been no change in that. Areas of erythema that had been outlined have decreased in size today.      Bilateral lower extremity cellulitis, improving  Bilateral lower extremity chronic lymphedema.  Bilateral lower extremity chronic leg wounds.  * Follows with Dr. Stanford, last seen and toe wound trimmed/debrided 5/10/2023. PTA recently prescribed cephalexin and doxycycline.  * Initial presentation as above.  Blood cultures obtained pending.  Started on cefazolin in the ED.  - Continue cefazolin.  - Hold PTA cephalexin and doxycyline for now.  - Continue PTA topical metronidazole (crushed tablet) to right leg, left leg and right toe wounds daily.  - Keep bilateral lower extremities elevated.  - WOC RN consult for wound cares.     DM2 with neuropathy.   [PTA: dulaglutide 0.75 mg subcutaneous q7d (pt not taking); glargine 10U at bedtime; metformin ER 1000 mg BID.]  * Hgb A1C 9.3 2/2023, repeat today 8.8  - Order moderate carbohydrate diet.  - Continue prior to admission glargine, metformin.  - Order medium aspart insulin correction scale.  - Hold prior to admission dulaglutide for now as patient is not apparently taking.     HTN.  CHF (diastolic).   CAD. S/p GREG stent in 2021.  [PTA: HCTZ 37.5 mg daily; losartan 100 mg daily; metoprolol ER 50 mg daily.]  * Echo 2021 showed LVEF  "55-60%, mild LVH; RV OK.  - Continue PTA aspirin, atorvastatin, hydrochlorothiazide, losartan, metoprolol ER.  - Monitor i/o's, daily wts.     Bipolar 1 d/o.  - Continue bupropion, cariprazine.     Chronic spine/disc disease with chronic pain with intrathecal pain med pump.  RLS.  Fibromyalgia.  * Follows with [facility]. Has intrathecal pump in situ containing fentanyl, bupivacaine and morphine.  - Continue PTA intrathecal pump with fentanyl, bupivacaine and morphine.  - Continue duloxetine, pregabalin PRN oxycodone-acetaminophen.     NORMA.   * Previously noted that pt does not use CPAP; reportedly due to back pain, he sleeps in a recliner and is not able to use CPAP.   - Continue modafinil.  - Follow-up outpatient.     Amyloidosis, AL type.   * S/p autologous BM tx 2016. Follows with Dr. Fonseca [facility].  - Noted.     MEJIAS.  * LFT's normal on admit.  - Noted.  - Continue to monitor clinically.        Diet: Moderate Consistent Carb (60 g CHO per Meal) Diet    DVT Prophylaxis: Pneumatic Compression Devices and Ambulate every shift  Parker Catheter: Not present  Lines: None     Cardiac Monitoring: None  Code Status: Full Code      Clinically Significant Risk Factors                  # Hypertension: Noted on problem list       # DMII: A1C = 8.8 % (Ref range: <5.7 %) within past 6 months, PRESENT ON ADMISSION  # Obesity: Estimated body mass index is 38.39 kg/m  as calculated from the following:    Height as of this encounter: 1.676 m (5' 6\").    Weight as of this encounter: 107.9 kg (237 lb 14 oz)., PRESENT ON ADMISSION          Disposition Plan      Expected Discharge Date: 05/14/2023    Discharge Delays: IV Medication - consider oral or Home Infusion            The patient's care was discussed with the Bedside Nurse and Patient.    Ivan Jimenez MD  Hospitalist Service  [facility]  Securely message with [employer] (more info)  Text page via [facility] Paging/Directory "   ______________________________________________________________________    Interval History   No pertinent events reported overnight. Pain in legs is improving. erythema is improving. Independent in room. Afebrile. Denies other pain or concerns. Suspect he needs 1-2 more days of IV therapy.    Physical Exam   Vital Signs: Temp: 97.4  F (36.3  C) Temp src: Oral BP: 103/66 Pulse: 71   Resp: 16 SpO2: 96 % O2 Device: None (Room air)    Weight: 237 lbs 14.02 oz    General Appearance: Alert, in NAD  Respiratory: chest symmetric with easy respirations bilaterally. Lung sounds clear and equal.   Cardiovascular: NSR  GI: soft; non-distended and non-tender  Skin: warm dry  Other: Some decrease in erythema and swelling in both lower legs. Open lesion outer aspect right lower leg healing without purulent drainage or erythema. Scabbed lesion right lower lesion healing. Scabbed lesion right great toe unchanged. Distal CMS intact.    Medical Decision Making       37 MINUTES SPENT BY ME on the date of service doing chart review, history, exam, documentation & further activities per the note.      Data         Imaging results reviewed over the past 24 hrs:   No results found for this or any previous visit (from the past 24 hour(s)).

## 2023-05-13 NOTE — PLAN OF CARE
Goal Outcome Evaluation:     A/O x4. VSS on RA. Independent in room. Voiding in BR. CMS intact with baseline numbness on BLE. PIV sl. Pain managed with Tylenol. Received intermittent IV abx. BG at 0200 224 mg/dl. Will continue to monitor.

## 2023-05-13 NOTE — PLAN OF CARE
Goal Outcome Evaluation:  Date/time: 5/12/23  [id number]  Diagnosis: BLE cellulitis, LLE wound, DM   POD#: NA  Mental Status: A&O x4  Behavior tools: green   Activity/dangle IND  Diet: mod cho  VSS /87  . 292   and 283  Pain: scheduled percocet/ tylenol PRN  Parker/Voiding: voiding  Tele/Restraints/Iso: NA  02/LDA: PIV SL  D/C Date:  TBD  Other Info: wound care daily right great toe and LLE Q3 days

## 2023-05-14 LAB
GLUCOSE BLDC GLUCOMTR-MCNC: 171 MG/DL (ref 70–99)
GLUCOSE BLDC GLUCOMTR-MCNC: 174 MG/DL (ref 70–99)
GLUCOSE BLDC GLUCOMTR-MCNC: 185 MG/DL (ref 70–99)
GLUCOSE BLDC GLUCOMTR-MCNC: 208 MG/DL (ref 70–99)
GLUCOSE BLDC GLUCOMTR-MCNC: 210 MG/DL (ref 70–99)

## 2023-05-14 PROCEDURE — 99232 SBSQ HOSP IP/OBS MODERATE 35: CPT | Performed by: STUDENT IN AN ORGANIZED HEALTH CARE EDUCATION/TRAINING PROGRAM

## 2023-05-14 PROCEDURE — 120N000001 HC R&B MED SURG/OB

## 2023-05-14 PROCEDURE — 250N000013 HC RX MED GY IP 250 OP 250 PS 637: Performed by: INTERNAL MEDICINE

## 2023-05-14 PROCEDURE — 250N000011 HC RX IP 250 OP 636: Performed by: INTERNAL MEDICINE

## 2023-05-14 RX ORDER — CEFADROXIL 500 MG/1
500 CAPSULE ORAL 2 TIMES DAILY
Qty: 28 CAPSULE | Refills: 0 | Status: ON HOLD | OUTPATIENT
Start: 2023-05-14 | End: 2023-05-21

## 2023-05-14 RX ADMIN — CEFAZOLIN SODIUM 2 G: 2 INJECTION, SOLUTION INTRAVENOUS at 13:47

## 2023-05-14 RX ADMIN — METRONIDAZOLE 500 MG: 500 TABLET ORAL at 09:01

## 2023-05-14 RX ADMIN — PREGABALIN 100 MG: 100 CAPSULE ORAL at 17:02

## 2023-05-14 RX ADMIN — CARIPRAZINE 4.5 MG: 4.5 CAPSULE, GELATIN COATED ORAL at 09:01

## 2023-05-14 RX ADMIN — Medication 600 MG: at 08:06

## 2023-05-14 RX ADMIN — ATORVASTATIN CALCIUM 30 MG: 20 TABLET, FILM COATED ORAL at 21:06

## 2023-05-14 RX ADMIN — PREGABALIN 100 MG: 100 CAPSULE ORAL at 21:06

## 2023-05-14 RX ADMIN — Medication: at 08:12

## 2023-05-14 RX ADMIN — CEFAZOLIN SODIUM 2 G: 2 INJECTION, SOLUTION INTRAVENOUS at 22:38

## 2023-05-14 RX ADMIN — DULOXETINE HYDROCHLORIDE 30 MG: 30 CAPSULE, DELAYED RELEASE ORAL at 08:06

## 2023-05-14 RX ADMIN — TESTOSTERONE 50 MG OF TESTOSTERONE: 50 GEL TOPICAL at 08:12

## 2023-05-14 RX ADMIN — MODAFINIL 300 MG: 200 TABLET ORAL at 09:02

## 2023-05-14 RX ADMIN — OXYCODONE HYDROCHLORIDE AND ACETAMINOPHEN 1 TABLET: 5; 325 TABLET ORAL at 08:07

## 2023-05-14 RX ADMIN — OXYCODONE HYDROCHLORIDE AND ACETAMINOPHEN 1 TABLET: 5; 325 TABLET ORAL at 21:06

## 2023-05-14 RX ADMIN — TAMSULOSIN HYDROCHLORIDE 0.4 MG: 0.4 CAPSULE ORAL at 08:06

## 2023-05-14 RX ADMIN — ACETAMINOPHEN 650 MG: 325 TABLET ORAL at 08:18

## 2023-05-14 RX ADMIN — DULOXETINE HYDROCHLORIDE 30 MG: 30 CAPSULE, DELAYED RELEASE ORAL at 21:06

## 2023-05-14 RX ADMIN — CEFAZOLIN SODIUM 2 G: 2 INJECTION, SOLUTION INTRAVENOUS at 06:28

## 2023-05-14 RX ADMIN — PREGABALIN 100 MG: 100 CAPSULE ORAL at 08:06

## 2023-05-14 RX ADMIN — MULTIPLE VITAMINS W/ MINERALS TAB 1 TABLET: TAB at 08:07

## 2023-05-14 RX ADMIN — METFORMIN ER 500 MG 1000 MG: 500 TABLET ORAL at 18:00

## 2023-05-14 RX ADMIN — HYDROCHLOROTHIAZIDE 37.5 MG: 25 TABLET ORAL at 09:01

## 2023-05-14 RX ADMIN — ASPIRIN 81 MG: 81 TABLET, COATED ORAL at 08:06

## 2023-05-14 RX ADMIN — B-COMPLEX W/ C & FOLIC ACID TAB 3 TABLET: TAB at 08:07

## 2023-05-14 RX ADMIN — LOSARTAN POTASSIUM 100 MG: 100 TABLET, FILM COATED ORAL at 08:07

## 2023-05-14 RX ADMIN — TAMSULOSIN HYDROCHLORIDE 0.4 MG: 0.4 CAPSULE ORAL at 21:06

## 2023-05-14 RX ADMIN — BUPROPION HYDROCHLORIDE 150 MG: 150 TABLET, FILM COATED, EXTENDED RELEASE ORAL at 08:07

## 2023-05-14 RX ADMIN — INSULIN GLARGINE 10 UNITS: 100 INJECTION, SOLUTION SUBCUTANEOUS at 21:13

## 2023-05-14 RX ADMIN — METOPROLOL SUCCINATE 50 MG: 50 TABLET, EXTENDED RELEASE ORAL at 08:07

## 2023-05-14 RX ADMIN — METFORMIN ER 500 MG 1000 MG: 500 TABLET ORAL at 08:06

## 2023-05-14 ASSESSMENT — ACTIVITIES OF DAILY LIVING (ADL)
ADLS_ACUITY_SCORE: 35

## 2023-05-14 NOTE — PLAN OF CARE
Patient vital signs are at baseline: Yes  Patient able to ambulate as they were prior to admission or with assist devices provided by therapies during their stay:  Yes  Patient MUST void prior to discharge:  Yes  Patient able to tolerate oral intake:  Yes  Pain has adequate pain control using Oral analgesics:  Yes  Does patient have an identified :  Yes  Has goal D/C date and time been discussed with patient:  Yes    Medical/Ortho    Diagnosis:Bilateral leg cellulitis  Mental Status:AxOx4  Activity/dangle: Independent   Diet:Mod carb diet  Pain:Scheduled percocet   Parker/Voiding:Voiding independently in the bathroom  02/LDA:Left AC S/L  D/C Date:TBD, tomorrow or Monday

## 2023-05-14 NOTE — PROGRESS NOTES
Goal Outcome Evaluation:  Date/time: 5/14/23  [id number]  Diagnosis: BLE cellulitis, LLE wound, DM   POD#: NA  Mental Status: A&O x4  Behavior tools: green   Activity/dangle IND  Diet: mod cho  VSS   Pain: Denies pain  Parker/Voiding: voiding  Tele/Restraints/Iso: NA  02/LDA: PIV SL  D/C Date:  TBD  Other Info: wound care daily right great toe and LLE Q3 days

## 2023-05-14 NOTE — PROGRESS NOTES
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     Bilateral lower extremity cellulitis, improving  Bilateral lower extremity chronic lymphedema.  Bilateral lower extremity chronic leg wounds.  * Follows with Dr. Stanford, last seen and toe wound trimmed/debrided 5/10/2023. PTA recently prescribed cephalexin and doxycycline.  * Initial presentation as above.  Blood cultures obtained pending.  Started on cefazolin in the ED.  - Continue cefazolin.  - Hold PTA cephalexin and doxycyline for now.  - plan discharge with cefadroxil for 14 days and doxycycline.   - Continue PTA topical metronidazole (crushed tablet) to right leg, left leg and right toe wounds daily.  - Keep bilateral lower extremities elevated.  - WOC RN consult for wound cares.     DM2 with neuropathy.   [PTA: dulaglutide 0.75 mg subcutaneous q7d (pt not taking); glargine 10U at bedtime; metformin ER 1000 mg BID.]  * Hgb A1C 9.3 2/2023, repeat today 8.8  - Order moderate carbohydrate diet.  - Continue prior to admission glargine, metformin.  - Order medium aspart insulin correction scale.  - Hold prior to admission dulaglutide for now as patient is not apparently taking.     HTN.  CHF (diastolic).   CAD. S/p GREG stent in 2021.  [PTA: HCTZ 37.5 mg daily; losartan 100 mg daily; metoprolol ER 50 mg daily.]  * Echo 2021 showed LVEF 55-60%, mild LVH; RV OK.  - Continue PTA aspirin, atorvastatin, hydrochlorothiazide, losartan,  "metoprolol ER.  - Monitor i/o's, daily wts.     Bipolar 1 d/o.  - Continue bupropion, cariprazine.     Chronic spine/disc disease with chronic pain with intrathecal pain med pump.  RLS.  Fibromyalgia.  * Follows with [facility]. Has intrathecal pump in situ containing fentanyl, bupivacaine and morphine.  - Continue PTA intrathecal pump with fentanyl, bupivacaine and morphine.  - Continue duloxetine, pregabalin PRN oxycodone-acetaminophen.     NORMA.   * Previously noted that pt does not use CPAP; reportedly due to back pain, he sleeps in a recliner and is not able to use CPAP.   - Continue modafinil.  - Follow-up outpatient.     Amyloidosis, AL type.   * S/p autologous BM tx 2016. Follows with Dr. [facility]onseca [facility].  - Noted.     MEJIAS.  * LFT's normal on admit.  - Noted.  - Continue to monitor clinically.        Diet: Moderate Consistent Carb (60 g CHO per Meal) Diet  Diet    DVT Prophylaxis: Pneumatic Compression Devices and Ambulate every shift  Parker Catheter: Not present  Lines: None     Cardiac Monitoring: None  Code Status: Full Code      Clinically Significant Risk Factors                  # Hypertension: Noted on problem list       # DMII: A1C = 8.8 % (Ref range: <5.7 %) within past 6 months, PRESENT ON ADMISSION  # Obesity: Estimated body mass index is 38.39 kg/m  as calculated from the following:    Height as of this encounter: 1.676 m (5' 6\").    Weight as of this encounter: 107.9 kg (237 lb 14 oz)., PRESENT ON ADMISSION          Disposition Plan      Expected Discharge Date: 05/15/2023                The patient's care was discussed with the Bedside Nurse and Patient.    Ivan Jimenez MD  Hospitalist Service  [facility]  Securely message with [employer] (more info)  Text page via [employer] Paging/Directory   ______________________________________________________________________    Interval History   No pertinent events reported overnight. Pain in legs is nearly to " baseline.  erythema is improving. Independent in room. Afebrile. Denies other pain or concerns. Plan discharge tomorrow.    Physical Exam   Vital Signs: Temp: 97.6  F (36.4  C) Temp src: Oral BP: 117/70 Pulse: 71   Resp: 16 SpO2: 99 % O2 Device: None (Room air)    Weight: 237 lbs 14.02 oz    General Appearance: Alert, in NAD  Respiratory: chest symmetric with easy respirations bilaterally. Lung sounds clear and equal.   Cardiovascular: NSR  GI: soft; non-distended and non-tender  Skin: warm dry  Other: Some decrease in erythema and swelling in both lower legs. Open lesion outer aspect right lower leg healing without purulent drainage or erythema. Scabbed lesion right lower lesion has fallen off with healed/intact skin underneath. Scabbed lesion right great toe unchanged. Distal CMS intact.    Medical Decision Making       35 MINUTES SPENT BY ME on the date of service doing chart review, history, exam, documentation & further activities per the note.      Data         Imaging results reviewed over the past 24 hrs:   No results found for this or any previous visit (from the past 24 hour(s)).

## 2023-05-14 NOTE — PLAN OF CARE
VSS, baseline numbness/tingling BLE. BLE red/swollen.Up ind with cane. Pain managed with scheduled percocet. A&OX4. Discharge home tomorrow on oral antibiotic.

## 2023-05-15 VITALS
HEIGHT: 66 IN | TEMPERATURE: 97.6 F | SYSTOLIC BLOOD PRESSURE: 109 MMHG | OXYGEN SATURATION: 97 % | WEIGHT: 237.88 LBS | HEART RATE: 76 BPM | BODY MASS INDEX: 38.23 KG/M2 | RESPIRATION RATE: 16 BRPM | DIASTOLIC BLOOD PRESSURE: 69 MMHG

## 2023-05-15 LAB
CREAT SERPL-MCNC: 0.88 MG/DL (ref 0.67–1.17)
GFR SERPL CREATININE-BSD FRML MDRD: >90 ML/MIN/1.73M2
GLUCOSE BLDC GLUCOMTR-MCNC: 165 MG/DL (ref 70–99)
GLUCOSE BLDC GLUCOMTR-MCNC: 235 MG/DL (ref 70–99)

## 2023-05-15 PROCEDURE — 36415 COLL VENOUS BLD VENIPUNCTURE: CPT | Performed by: STUDENT IN AN ORGANIZED HEALTH CARE EDUCATION/TRAINING PROGRAM

## 2023-05-15 PROCEDURE — 250N000011 HC RX IP 250 OP 636: Performed by: INTERNAL MEDICINE

## 2023-05-15 PROCEDURE — 82565 ASSAY OF CREATININE: CPT | Performed by: STUDENT IN AN ORGANIZED HEALTH CARE EDUCATION/TRAINING PROGRAM

## 2023-05-15 PROCEDURE — 250N000013 HC RX MED GY IP 250 OP 250 PS 637: Performed by: INTERNAL MEDICINE

## 2023-05-15 PROCEDURE — 99239 HOSP IP/OBS DSCHRG MGMT >30: CPT | Performed by: STUDENT IN AN ORGANIZED HEALTH CARE EDUCATION/TRAINING PROGRAM

## 2023-05-15 RX ORDER — MODAFINIL 200 MG/1
300 TABLET ORAL DAILY
Qty: 6 TABLET | Refills: 0 | Status: SHIPPED | OUTPATIENT
Start: 2023-05-15 | End: 2023-06-01

## 2023-05-15 RX ADMIN — DULOXETINE HYDROCHLORIDE 30 MG: 30 CAPSULE, DELAYED RELEASE ORAL at 08:33

## 2023-05-15 RX ADMIN — ASPIRIN 81 MG: 81 TABLET, COATED ORAL at 08:34

## 2023-05-15 RX ADMIN — METOPROLOL SUCCINATE 50 MG: 50 TABLET, EXTENDED RELEASE ORAL at 08:33

## 2023-05-15 RX ADMIN — METFORMIN ER 500 MG 1000 MG: 500 TABLET ORAL at 08:33

## 2023-05-15 RX ADMIN — OXYCODONE HYDROCHLORIDE AND ACETAMINOPHEN 1 TABLET: 5; 325 TABLET ORAL at 08:34

## 2023-05-15 RX ADMIN — ACETAMINOPHEN 650 MG: 325 TABLET ORAL at 04:43

## 2023-05-15 RX ADMIN — BUPROPION HYDROCHLORIDE 150 MG: 150 TABLET, FILM COATED, EXTENDED RELEASE ORAL at 08:33

## 2023-05-15 RX ADMIN — MULTIPLE VITAMINS W/ MINERALS TAB 1 TABLET: TAB at 08:32

## 2023-05-15 RX ADMIN — TAMSULOSIN HYDROCHLORIDE 0.4 MG: 0.4 CAPSULE ORAL at 08:32

## 2023-05-15 RX ADMIN — MODAFINIL 300 MG: 200 TABLET ORAL at 10:22

## 2023-05-15 RX ADMIN — Medication: at 08:34

## 2023-05-15 RX ADMIN — LOSARTAN POTASSIUM 100 MG: 100 TABLET, FILM COATED ORAL at 08:35

## 2023-05-15 RX ADMIN — PREGABALIN 100 MG: 100 CAPSULE ORAL at 08:34

## 2023-05-15 RX ADMIN — HYDROCHLOROTHIAZIDE 37.5 MG: 25 TABLET ORAL at 10:24

## 2023-05-15 RX ADMIN — CEFAZOLIN SODIUM 2 G: 2 INJECTION, SOLUTION INTRAVENOUS at 05:18

## 2023-05-15 RX ADMIN — CARIPRAZINE 4.5 MG: 4.5 CAPSULE, GELATIN COATED ORAL at 10:24

## 2023-05-15 RX ADMIN — TESTOSTERONE 50 MG OF TESTOSTERONE: 50 GEL TOPICAL at 10:23

## 2023-05-15 RX ADMIN — Medication 600 MG: at 08:32

## 2023-05-15 RX ADMIN — B-COMPLEX W/ C & FOLIC ACID TAB 3 TABLET: TAB at 08:38

## 2023-05-15 ASSESSMENT — ACTIVITIES OF DAILY LIVING (ADL)
ADLS_ACUITY_SCORE: 35

## 2023-05-15 NOTE — PLAN OF CARE
Oriented x 4. Up independent. C/O generalized pain, is on scheduled percocet and intrathecal pain pump. Refused to let writer do the dressing change on BLEs, stating pt will do it on his own. BLEs red and warm to touch. Has BLEs numbness d/t neuropathy. Discharge instruction reviewed with pt and all questions answered. Meds and belongings sent along.

## 2023-05-15 NOTE — DISCHARGE SUMMARY
"[facility]  Hospitalist Discharge Summary      Date of Admission:  5/11/2023  Date of Discharge:  5/15/2023  Discharging Provider: Ivan Jimenez MD  Discharge Service: Hospitalist Service    Discharge Diagnoses   As below    Clinically Significant Risk Factors     # DMII: A1C = 8.8 % (Ref range: <5.7 %) within past 6 months  # Obesity: Estimated body mass index is 38.39 kg/m  as calculated from the following:    Height as of this encounter: 1.676 m (5' 6\").    Weight as of this encounter: 107.9 kg (237 lb 14 oz).       Follow-ups Needed After Discharge   Follow-up Appointments     Follow-up and recommended labs and tests       Follow up with primary care provider, Sherwin Mcdermott, within 7   days for hospital follow- up.  No follow up labs or test are needed.              Unresulted Labs Ordered in the Past 30 Days of this Admission     Date and Time Order Name Status Description    5/12/2023  8:53 AM Testosterone total In process       These results will be followed up by hospitalist result pool, recommend it's sent on to PCP if abnormal.     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 He was treated with cefazolin while hospitalized with significant improvement in symptoms. He will discharge home with 2 week course of antibiotics to follow up with PCP and wound clinic.      Bilateral lower extremity cellulitis, improving  Bilateral lower extremity chronic lymphedema.  Bilateral lower " extremity chronic leg wounds.  * Follows with Dr. Stanford, last seen and toe wound trimmed/debrided 5/10/2023. PTA recently prescribed cephalexin and doxycycline.  * Initial presentation as above.    - Discharge with cefadroxil for 14 days and doxycycline.   - Continue PTA topical metronidazole (crushed tablet) to right leg, left leg and right toe wounds daily.  - Keep bilateral lower extremities elevated as able  - continue wound cares and follow up in wound clinic     DM2 with neuropathy.   [PTA: dulaglutide 0.75 mg subcutaneous q7d (pt not taking); glargine 10U at bedtime; metformin ER 1000 mg BID.]  * Hgb A1C 9.3 2/2023, repeat today 8.8  - Continue prior to admission glargine, metformin.  - Patient to follow up with PCP re dulaglutide use.     HTN.  CHF (diastolic).   CAD. S/p GREG stent in 2021.  [PTA: HCTZ 37.5 mg daily; losartan 100 mg daily; metoprolol ER 50 mg daily.]  * Echo 2021 showed LVEF 55-60%, mild LVH; RV OK.  - Continue PTA aspirin, atorvastatin, hydrochlorothiazide, losartan, metoprolol ER.     Bipolar 1 d/o.  - Continue bupropion, cariprazine.     Chronic spine/disc disease with chronic pain with intrathecal pain med pump.  RLS.  Fibromyalgia.  * Follows with Dr. Shaw, [facility]. Has intrathecal pump in situ containing fentanyl, bupivacaine and morphine.  - Continue PTA intrathecal pump with fentanyl, bupivacaine and morphine.  - Continue duloxetine, pregabalin PRN oxycodone-acetaminophen.     NORMA.   * Previously noted that pt does not use CPAP; reportedly due to back pain, he sleeps in a recliner and is not able to use CPAP.   - Continue modafinil.  - Did write for a small number (4 days worth) to allow him time to contact PCP for refill.  - Follow-up outpatient.     Amyloidosis, AL type.   * S/p autologous BM tx 2016. Follows with BIPIN Padilla.  - Noted.     RANDELL.  * LFT's normal on admit.  - Noted.  - Continue to monitor clinically in follow up    Consultations This [facility]  Stay   WOUND OSTOMY CONTINENCE NURSE  IP CONSULT    Code Status   Full Code    Time Spent on this Encounter   I, Ivan Jimenez MD, personally saw the patient today and spent greater than 30 minutes discharging this patient.       Ivan Jimenez MD  [facility]  [address] [place] [address]  Phone: [phone]  Fax: [phone]  ______________________________________________________________________    Physical Exam   Vital Signs: Temp: 97.6  F (36.4  C) Temp src: Oral BP: 109/69 Pulse: 76   Resp: 16 SpO2: 97 % O2 Device: None (Room air)    Weight: 237 lbs 14.02 oz  General Appearance:  Alert, in NAD  Respiratory: chest symmetric with easy respirations bilaterally. Lung sounds clear and equal.   Cardiovascular: NSR  GI: soft; non-distended and non-tender  Skin: warm dry  Other:  legs with chronic rosaura discoloration. Erythema resolved. No areas of abnormal warmth. Open lesion outer aspect right lower leg healing without purulent drainage or erythema. This was re-dressed. Scabbed lesion right lower lesion has fallen off with healed/intact skin underneath. Scabbed lesion right great toe unchanged. Distal CMS intact.       Primary Care Physician   Sherwin Mcdermott    Discharge Orders      Medication Therapy Management Referral      Reason for your hospital stay    Bilateral leg cellulitis     Follow-up and recommended labs and tests     Follow up with primary care provider, Sherwin Mcdermott, within 7 days for hospital follow- up.  No follow up labs or test are needed.     Activity    Your activity upon discharge: activity as tolerated     Diet    Follow this diet upon discharge: Orders Placed This Encounter      Moderate Consistent Carb (60 g CHO per Meal) Diet       Significant Results and Procedures   Most Recent 3 CBC's:Recent Labs   Lab Test 05/12/23  0634 05/11/23  1432 04/17/23  1453   WBC 6.1 7.5 10.6   HGB 13.8 14.4 15.6   MCV 92 91 93   *  158 192     Most Recent 3 BMP's:Recent Labs   Lab Test 05/15/23  1152 05/15/23  0837 05/15/23  0705 05/14/23  2049 05/12/23  0733 05/12/23  0634 05/12/23  0037 05/11/23  1432 04/17/23  1453   NA  --   --   --   --   --  133*  --  137 134*   POTASSIUM  --   --   --   --   --  3.4  --  3.4 4.1   CHLORIDE  --   --   --   --   --  95*  --  98 96*   CO2  --   --   --   --   --  27  --  24 24   BUN  --   --   --   --   --  16.1  --  15.6 15.6   CR  --  0.88  --   --   --  0.85  --  0.86 0.79   ANIONGAP  --   --   --   --   --  11  --  15 14   LUIS  --   --   --   --   --  8.9  --  9.8 10.0   *  --  235* 185*   < > 267*   < > 237* 196*    < > = values in this interval not displayed.     7-Day Micro Results     No results found for the last 168 hours.      ,   Results for orders placed or performed in visit on 03/27/23   MR Brain w/o & w Contrast    Narrative    EXAM: MR BRAIN W/O and W CONTRAST  LOCATION: [facility]  DATE/TIME: 3/27/2023 1:09 PM    INDICATION:  Memory difficulty, Abnormal MRI of head  COMPARISON: 11/16/2020.  CONTRAST: 10mL gadavist  TECHNIQUE: MRI brain without and with contrast special attention to the sella region.    FINDINGS: On the diffusion-weighted images there is no evidence of acute ischemia or restricted diffusion. There is no discrete mass lesion or midline shift. There is no acute extra-axial fluid collection or acute intraparenchymal hemorrhage. There are   appropriate flow voids within the cavernous portions of the internal carotid arteries and the basilar artery. On the FLAIR and T2-weighted images there are a few tiny foci of high signal within the periventricular and subcortical white matter consistent   with minimal small vessel ischemic disease. The ventricular system, basal cisterns and sulci are consistent with diffuse volume loss.    On the dedicated images through the sella region again visualized is the mildly enlarged solid enhancing pituitary  gland. This measures approximately 1.4 cm anteriorly posteriorly by 1.0 cm craniocaudally which is stable in the interval. There is normal   enhancement and thickness of the pituitary stalk. There is no evidence of compression upon the optic chiasm.    There is no evidence of cerebellar tonsillar ectopia. Corpus callosum is within normal limits for age. The orbit regions are unremarkable. There is no significant paranasal sinus disease. The mastoid air cells and the middle ear regions are clear.      Impression    IMPRESSION:  1. Stable mild enlargement pituitary gland with homogeneous enhancement.  2. No new mass lesion, hemorrhage or focal area suggestive of acute ischemia.  3. Mild age-related changes.     *Note: Due to a large number of results and/or encounters for the requested time period, some results have not been displayed. A complete set of results can be found in Results Review.       Discharge Medications   Current Discharge Medication List      START taking these medications    Details   cefadroxil (DURICEF) 500 MG capsule Take 1 capsule (500 mg) by mouth 2 times daily for 14 days  Qty: 28 capsule, Refills: 0    Associated Diagnoses: Bilateral lower leg cellulitis         CONTINUE these medications which have CHANGED    Details   modafinil (PROVIGIL) 200 MG tablet Take 1.5 tablets (300 mg) by mouth daily  Qty: 6 tablet, Refills: 0    Associated Diagnoses: Obstructive sleep apnea syndrome         CONTINUE these medications which have NOT CHANGED    Details   acetaminophen (TYLENOL) 500 MG tablet Take 1,000 mg by mouth every 8 hours as needed      ammonium lactate (AMLACTIN) 12 % external cream Apply topically daily To feet and legs daily & as needed  Qty: 385 g, Refills: 1    Associated Diagnoses: Non-pressure ulcer of right lower extremity with fat layer exposed (H); Chronic ulcer of left leg with fat layer exposed (H)      aspirin (ASPIRIN LOW DOSE) 81 MG tablet Take 1 tablet (81 mg) by mouth  daily  Qty: 30 tablet, Refills: 3    Associated Diagnoses: Hyperlipidemia LDL goal <100; Hypertension goal BP (blood pressure) < 140/90      atorvastatin (LIPITOR) 20 MG tablet Take 1.5 tablets (30 mg) by mouth daily  Qty: 135 tablet, Refills: 3    Associated Diagnoses: Hyperlipidemia LDL goal <100      Bioflavonoid Products (CANDIDO-C) TABS Take 1,000 mg by mouth daily  Qty: 90 tablet, Refills: 3    Associated Diagnoses: Chronic ulcer of right leg, limited to breakdown of skin (H)      buPROPion (WELLBUTRIN XL) 150 MG 24 hr tablet Take 150 mg by mouth every morning      calcium carbonate (OS-LUIS) 1500 (600 Ca) MG tablet Take 600 mg by mouth daily      cariprazine (VRAYLAR) 4.5 MG capsule Take 4.5 mg by mouth daily      doxycycline hyclate (VIBRAMYCIN) 100 MG capsule Take 1 capsule (100 mg) by mouth 2 times daily for 30 days  Qty: 60 capsule, Refills: 0    Associated Diagnoses: Non-pressure ulcer of right lower extremity with fat layer exposed (H); Chronic ulcer of left leg with fat layer exposed (H)      DULoxetine (CYMBALTA) 30 MG capsule Take 30 mg by mouth 2 times daily      glucose 40 % (400 mg/mL) gel Take 15-30 g by mouth every 15 minutes as needed for low blood sugar  Qty: 15 g, Refills: 0    Associated Diagnoses: Type 2 diabetes mellitus without complication, unspecified whether long term insulin use (H)      hydrochlorothiazide (HYDRODIURIL) 25 MG tablet Take 1.5 tablets (37.5 mg) by mouth daily  Qty: 135 tablet, Refills: 0    Associated Diagnoses: Hypertension goal BP (blood pressure) < 140/90      insulin glargine (LANTUS PEN) 100 UNIT/ML pen Inject 10 Units Subcutaneous At Bedtime  Qty: 15 mL, Refills: 0    Comments: If Lantus is not covered by insurance, may substitute Basaglar or Semglee or other insulin glargine product per insurance preference at same dose and frequency.    Associated Diagnoses: Type 2 diabetes mellitus without complication, unspecified whether long term insulin use (H)       loperamide (IMODIUM) 2 MG capsule Take 1 capsule (2 mg) by mouth 4 times daily as needed for diarrhea    Associated Diagnoses: Loose stools      losartan (COZAAR) 100 MG tablet Take 1 tablet (100 mg) by mouth daily  Qty: 90 tablet, Refills: 1    Associated Diagnoses: Benign essential hypertension      medication given by implanted intrathecal pump continuous Drug # 1: Fentanyl (Sublimaze) - Conc: 2000 mcg/mL - Total Dose / 24 hours: 1561.9 mcg  Drug # 2: Bupivacaine (Marcaine)  - Conc: 20 mg/mL - Total Dose / 24 hours: 15.619 mg  Drug # 3: Morphine (Duramorph or Infumorph)  - Conc: 9 mg/mL - Total Dose / 24 hours: 7.0287 mg    Rate: 0.0325 mL/hr  Pump Reservoir Volume: 40 mL  Outside Clinic & Provider: Dr. Pawan Shaw [facility]  Last Refill Date: 4/6/2023  Next Refill Date: 5/17/2023  Low [place] Alarm Date: 4/4/2023  Pump : Telovations SynchroMed II    Boluses: Up to 3 per day, 3 minute duration. Lock-out every 6 hours  Fentanyl: 99.9 mcg/dose  Bupivacaine: 0.999 mg/dose  Morphine: 0.4497 mg/dose      metFORMIN (GLUCOPHAGE XR) 500 MG 24 hr tablet Take 2 tablets (1,000 mg) by mouth 2 times daily (with meals)  Qty: 360 tablet, Refills: 1    Associated Diagnoses: Type 2 diabetes mellitus with other specified complication, without long-term current use of insulin (H)      metoprolol succinate ER (TOPROL XL) 50 MG 24 hr tablet Take 50 mg by mouth daily      metroNIDAZOLE (FLAGYL) 500 MG tablet Crush and sprinkle light layer over right leg, left leg and right toe wounds daily  Qty: 60 tablet, Refills: 0    Associated Diagnoses: Non-pressure ulcer of right lower extremity with fat layer exposed (H); Chronic ulcer of left leg with fat layer exposed (H)      multivitamin w/minerals (THERA-VIT-M) tablet Take 1 tablet by mouth daily Men's 50+      nitroGLYcerin (NITROSTAT) 0.4 MG sublingual tablet Place 0.4 mg under the tongue every 5 minutes as needed for chest pain For chest pain place 1 tablet under  "the tongue every 5 minutes for 3 doses. If symptoms persist 5 minutes after 1st dose call 911.      OVER-THE-COUNTER Take 3 capsules by mouth daily Supplement for brain health, unknown brand/ingredients      oxyCODONE-acetaminophen (PERCOCET) 5-325 MG tablet Take 1 tablet by mouth 2 times daily      POTASSIUM PO Take 1 tablet by mouth daily Unspecified tablet strength; patient estimated \"600 mg, +/- 200 mg\", takes for leg cramps      pregabalin (LYRICA) 100 MG capsule Take 100 mg by mouth Take up to 4 times per day      senna-docusate (SENOKOT-S/PERICOLACE) 8.6-50 MG tablet Take 1-2 tablets by mouth 2 times daily At baseline, Take 1 tablet twice daily. May take second tablet if needed.      SUMAtriptan (IMITREX) 50 MG tablet Take 1 tablet (50 mg) by mouth at onset of headache for migraine May repeat in 2 hours. Max 4 tablets/24 hours.  Qty: 8 tablet, Refills: 1    Associated Diagnoses: Mixed common migraine and muscle contraction headache      tamsulosin (FLOMAX) 0.4 MG capsule Take 1 capsule (0.4 mg) by mouth 2 times daily  Qty: 30 capsule, Refills: 3    Associated Diagnoses: Urinary retention with incomplete bladder emptying      testosterone (ANDROGEL/TESTIM) 50 MG/5GM (1%) topical gel Place 50 mg of testosterone onto the skin daily      vitamin B-Complex Take 3 tablets by mouth daily      vitamin D3 (CHOLECALCIFEROL) 1.25 MG (82503 UT) capsule Take 1 capsule (50,000 Units) by mouth every 7 days  Qty: 90 capsule, Refills: 3    Associated Diagnoses: Chronic ulcer of right leg, limited to breakdown of skin (H)      blood glucose (NO BRAND SPECIFIED) lancets standard Use to test blood sugar 1 time daily or as directed.  Qty: 100 lancet, Refills: 4    Associated Diagnoses: Type 2 diabetes mellitus with other specified complication, without long-term current use of insulin (H)      blood glucose (NO BRAND SPECIFIED) test strip Use to test blood sugar 1 time daily or as directed.  Qty: 200 strip, Refills: 4    " Associated Diagnoses: Type 2 diabetes mellitus with other specified complication, without long-term current use of insulin (H)      dulaglutide (TRULICITY) 0.75 MG/0.5ML pen Inject 0.75 mg Subcutaneous every 7 days  Qty: 2 mL, Refills: 3    Associated Diagnoses: Type 2 diabetes mellitus with other circulatory complication, without long-term current use of insulin (H)      Folic Acid-Vit B6-Vit B12 0.5-5-0.2 MG TABS Take 1 tablet by mouth daily  Qty: 90 tablet, Refills: 3    Associated Diagnoses: Chronic ulcer of right leg, limited to breakdown of skin (H)         STOP taking these medications       cephALEXin (KEFLEX) 500 MG capsule Comments:   Reason for Stopping:             Allergies   Allergies   Allergen Reactions     Cephalexin Diarrhea     Liraglutide Other (See Comments), Headache and Unknown     Other reaction(s): Headache, Unknown  PN: migraines - Increased migraine frequency and severity       Sulfa Antibiotics Angioedema and Swelling     Pt has taken  Taken sulfa drugs orally without trouble. He had problems with Sulfa eye drops. Eye swelled up

## 2023-05-15 NOTE — PLAN OF CARE
Goal Outcome Evaluation:  Date/Time 5/14-5/15 3738-8735 AM  Diagnosis: Bilateral lower leg cellulitis  Mental Status: A/Ox4  Activity/dangle:IND  Diet: mod carb  Pain: managed with PRN Tylenol  Parker/Voiding: BR  Tele/Restraints/Iso: NA  Skin: Chronic wounds LE  02/LDA: VSS on RA.PIV patent/SL  D/C Date: Possible home discharge today  Other Info: LL E wounds dressing changed 5/14.CMS within baseline.

## 2023-05-15 NOTE — PLAN OF CARE
Medical/Ortho    Diagnosis:Bilateral leg cellulitis  Mental Status:AxOx4  Activity/dangle: Independent  Diet:Mod carb 60 g  Pain:Scheduled percocet  Parker/Voiding:Voiding independently in the bathroom  02/LDA:S/L  D/C Date:5/15

## 2023-05-16 LAB — TESTOST SERPL-MCNC: 92 NG/DL (ref 240–950)

## 2023-05-17 ENCOUNTER — PATIENT OUTREACH (OUTPATIENT)
Dept: CARE COORDINATION | Facility: CLINIC | Age: 68
End: 2023-05-17
Payer: COMMERCIAL

## 2023-05-17 ENCOUNTER — TELEPHONE (OUTPATIENT)
Dept: FAMILY MEDICINE | Facility: CLINIC | Age: 68
End: 2023-05-17
Payer: COMMERCIAL

## 2023-05-17 NOTE — TELEPHONE ENCOUNTER
MTM referral from: Transitions of Care (recent hospital discharge or ED visit)    MTM referral outreach attempt #2 on May 17, 2023 at 11:02 AM      Outcome: Patient not reachable after several attempts, will route to MT Pharmacist/Provider as an FYI.  [facility] scheduling number is [phone].  Thank you for the referral.    Use VBC(DIO) for the carrier/Plan on the flowsheet    Selam Flores - [facility]

## 2023-05-17 NOTE — PROGRESS NOTES
Clinic Care Coordination Contact  [facility]/Voicemail       Clinical Data: Care Coordinator Outreach  Outreach attempted x 2.  Left message on patient's voicemail with call back information and requested return call.  Plan: Care Coordinator will do no further outreaches at this time.    LORI Yates  [phone]  [facility]

## 2023-05-18 ENCOUNTER — APPOINTMENT (OUTPATIENT)
Dept: GENERAL RADIOLOGY | Facility: CLINIC | Age: 68
DRG: 603 | End: 2023-05-18
Attending: EMERGENCY MEDICINE
Payer: COMMERCIAL

## 2023-05-18 ENCOUNTER — HOSPITAL ENCOUNTER (INPATIENT)
Facility: CLINIC | Age: 68
LOS: 3 days | Discharge: HOME | DRG: 603 | End: 2023-05-21
Attending: EMERGENCY MEDICINE | Admitting: INTERNAL MEDICINE
Payer: COMMERCIAL

## 2023-05-18 DIAGNOSIS — L97.922 CHRONIC ULCER OF LEFT LEG WITH FAT LAYER EXPOSED (H): ICD-10-CM

## 2023-05-18 DIAGNOSIS — L03.116 CELLULITIS OF LEFT LOWER EXTREMITY: ICD-10-CM

## 2023-05-18 DIAGNOSIS — L03.116 BILATERAL LOWER LEG CELLULITIS: Primary | ICD-10-CM

## 2023-05-18 DIAGNOSIS — L03.115 BILATERAL LOWER LEG CELLULITIS: Primary | ICD-10-CM

## 2023-05-18 LAB
ALBUMIN SERPL BCG-MCNC: 4 G/DL (ref 3.5–5.2)
ALBUMIN UR-MCNC: 20 MG/DL
ALP SERPL-CCNC: 100 U/L (ref 40–129)
ALT SERPL W P-5'-P-CCNC: 26 U/L (ref 10–50)
ANION GAP SERPL CALCULATED.3IONS-SCNC: 9 MMOL/L (ref 7–15)
APPEARANCE UR: CLEAR
AST SERPL W P-5'-P-CCNC: 31 U/L (ref 10–50)
ATRIAL RATE - MUSE: 92 BPM
BASOPHILS # BLD AUTO: 0 10E3/UL (ref 0–0.2)
BASOPHILS NFR BLD AUTO: 1 %
BILIRUB SERPL-MCNC: 0.7 MG/DL
BILIRUB UR QL STRIP: NEGATIVE
BUN SERPL-MCNC: 14.4 MG/DL (ref 8–23)
CALCIUM SERPL-MCNC: 9.2 MG/DL (ref 8.8–10.2)
CHLORIDE SERPL-SCNC: 100 MMOL/L (ref 98–107)
COLOR UR AUTO: YELLOW
CREAT SERPL-MCNC: 0.71 MG/DL (ref 0.67–1.17)
CREAT SERPL-MCNC: 0.77 MG/DL (ref 0.67–1.17)
DEPRECATED HCO3 PLAS-SCNC: 29 MMOL/L (ref 22–29)
DIASTOLIC BLOOD PRESSURE - MUSE: NORMAL MMHG
EOSINOPHIL # BLD AUTO: 0.1 10E3/UL (ref 0–0.7)
EOSINOPHIL NFR BLD AUTO: 3 %
ERYTHROCYTE [DISTWIDTH] IN BLOOD BY AUTOMATED COUNT: 13.6 % (ref 10–15)
GFR SERPL CREATININE-BSD FRML MDRD: >90 ML/MIN/1.73M2
GFR SERPL CREATININE-BSD FRML MDRD: >90 ML/MIN/1.73M2
GLUCOSE BLDC GLUCOMTR-MCNC: 232 MG/DL (ref 70–99)
GLUCOSE SERPL-MCNC: 265 MG/DL (ref 70–99)
GLUCOSE UR STRIP-MCNC: 300 MG/DL
HCT VFR BLD AUTO: 41.2 % (ref 40–53)
HGB BLD-MCNC: 14.1 G/DL (ref 13.3–17.7)
HGB UR QL STRIP: NEGATIVE
IMM GRANULOCYTES # BLD: 0 10E3/UL
IMM GRANULOCYTES NFR BLD: 1 %
INTERPRETATION ECG - MUSE: NORMAL
KETONES UR STRIP-MCNC: 10 MG/DL
LACTATE SERPL-SCNC: 0.7 MMOL/L (ref 0.7–2)
LEUKOCYTE ESTERASE UR QL STRIP: NEGATIVE
LYMPHOCYTES # BLD AUTO: 0.5 10E3/UL (ref 0.8–5.3)
LYMPHOCYTES NFR BLD AUTO: 12 %
MAGNESIUM SERPL-MCNC: 1.9 MG/DL (ref 1.7–2.3)
MCH RBC QN AUTO: 32.6 PG (ref 26.5–33)
MCHC RBC AUTO-ENTMCNC: 34.2 G/DL (ref 31.5–36.5)
MCV RBC AUTO: 95 FL (ref 78–100)
MONOCYTES # BLD AUTO: 0.3 10E3/UL (ref 0–1.3)
MONOCYTES NFR BLD AUTO: 8 %
MUCOUS THREADS #/AREA URNS LPF: PRESENT /LPF
NEUTROPHILS # BLD AUTO: 3.2 10E3/UL (ref 1.6–8.3)
NEUTROPHILS NFR BLD AUTO: 75 %
NITRATE UR QL: NEGATIVE
NRBC # BLD AUTO: 0 10E3/UL
NRBC BLD AUTO-RTO: 0 /100
NT-PROBNP SERPL-MCNC: 253 PG/ML (ref 0–900)
P AXIS - MUSE: 24 DEGREES
PH UR STRIP: 6 [PH] (ref 5–7)
PLATELET # BLD AUTO: 127 10E3/UL (ref 150–450)
POTASSIUM SERPL-SCNC: 4.1 MMOL/L (ref 3.4–5.3)
PR INTERVAL - MUSE: 192 MS
PROT SERPL-MCNC: 6.5 G/DL (ref 6.4–8.3)
QRS DURATION - MUSE: 118 MS
QT - MUSE: 368 MS
QTC - MUSE: 455 MS
R AXIS - MUSE: -57 DEGREES
RBC # BLD AUTO: 4.33 10E6/UL (ref 4.4–5.9)
RBC URINE: <1 /HPF
SODIUM SERPL-SCNC: 138 MMOL/L (ref 136–145)
SP GR UR STRIP: 1.03 (ref 1–1.03)
SYSTOLIC BLOOD PRESSURE - MUSE: NORMAL MMHG
T AXIS - MUSE: -2 DEGREES
TROPONIN T SERPL HS-MCNC: 23 NG/L
UROBILINOGEN UR STRIP-MCNC: NORMAL MG/DL
VENTRICULAR RATE- MUSE: 92 BPM
WBC # BLD AUTO: 4.2 10E3/UL (ref 4–11)
WBC URINE: <1 /HPF

## 2023-05-18 PROCEDURE — 120N000001 HC R&B MED SURG/OB

## 2023-05-18 PROCEDURE — 250N000011 HC RX IP 250 OP 636: Performed by: EMERGENCY MEDICINE

## 2023-05-18 PROCEDURE — 83735 ASSAY OF MAGNESIUM: CPT | Performed by: INTERNAL MEDICINE

## 2023-05-18 PROCEDURE — 71046 X-RAY EXAM CHEST 2 VIEWS: CPT

## 2023-05-18 PROCEDURE — 82565 ASSAY OF CREATININE: CPT | Performed by: INTERNAL MEDICINE

## 2023-05-18 PROCEDURE — 250N000012 HC RX MED GY IP 250 OP 636 PS 637: Performed by: INTERNAL MEDICINE

## 2023-05-18 PROCEDURE — 36415 COLL VENOUS BLD VENIPUNCTURE: CPT | Performed by: EMERGENCY MEDICINE

## 2023-05-18 PROCEDURE — 83880 ASSAY OF NATRIURETIC PEPTIDE: CPT | Performed by: EMERGENCY MEDICINE

## 2023-05-18 PROCEDURE — 85025 COMPLETE CBC W/AUTO DIFF WBC: CPT | Performed by: EMERGENCY MEDICINE

## 2023-05-18 PROCEDURE — 84484 ASSAY OF TROPONIN QUANT: CPT | Performed by: EMERGENCY MEDICINE

## 2023-05-18 PROCEDURE — 81001 URINALYSIS AUTO W/SCOPE: CPT | Performed by: EMERGENCY MEDICINE

## 2023-05-18 PROCEDURE — 87040 BLOOD CULTURE FOR BACTERIA: CPT | Performed by: EMERGENCY MEDICINE

## 2023-05-18 PROCEDURE — 83605 ASSAY OF LACTIC ACID: CPT | Performed by: EMERGENCY MEDICINE

## 2023-05-18 PROCEDURE — 250N000013 HC RX MED GY IP 250 OP 250 PS 637: Performed by: EMERGENCY MEDICINE

## 2023-05-18 PROCEDURE — 99223 1ST HOSP IP/OBS HIGH 75: CPT | Performed by: INTERNAL MEDICINE

## 2023-05-18 PROCEDURE — 80053 COMPREHEN METABOLIC PANEL: CPT | Performed by: EMERGENCY MEDICINE

## 2023-05-18 PROCEDURE — 250N000013 HC RX MED GY IP 250 OP 250 PS 637: Performed by: INTERNAL MEDICINE

## 2023-05-18 PROCEDURE — 99285 EMERGENCY DEPT VISIT HI MDM: CPT | Mod: 25

## 2023-05-18 PROCEDURE — 250N000011 HC RX IP 250 OP 636: Performed by: INTERNAL MEDICINE

## 2023-05-18 PROCEDURE — 36415 COLL VENOUS BLD VENIPUNCTURE: CPT | Performed by: INTERNAL MEDICINE

## 2023-05-18 PROCEDURE — 96365 THER/PROPH/DIAG IV INF INIT: CPT

## 2023-05-18 PROCEDURE — 93005 ELECTROCARDIOGRAM TRACING: CPT

## 2023-05-18 RX ORDER — AMOXICILLIN 250 MG
2 CAPSULE ORAL 2 TIMES DAILY PRN
Status: DISCONTINUED | OUTPATIENT
Start: 2023-05-18 | End: 2023-05-21 | Stop reason: HOSPADM

## 2023-05-18 RX ORDER — DEXTROSE MONOHYDRATE 25 G/50ML
25-50 INJECTION, SOLUTION INTRAVENOUS
Status: DISCONTINUED | OUTPATIENT
Start: 2023-05-18 | End: 2023-05-21 | Stop reason: HOSPADM

## 2023-05-18 RX ORDER — METOPROLOL SUCCINATE 50 MG/1
50 TABLET, EXTENDED RELEASE ORAL DAILY
Status: DISCONTINUED | OUTPATIENT
Start: 2023-05-18 | End: 2023-05-21 | Stop reason: HOSPADM

## 2023-05-18 RX ORDER — PROCHLORPERAZINE MALEATE 5 MG
5 TABLET ORAL EVERY 6 HOURS PRN
Status: DISCONTINUED | OUTPATIENT
Start: 2023-05-18 | End: 2023-05-21 | Stop reason: HOSPADM

## 2023-05-18 RX ORDER — PREGABALIN 100 MG/1
100 CAPSULE ORAL 3 TIMES DAILY
Status: DISCONTINUED | OUTPATIENT
Start: 2023-05-18 | End: 2023-05-21 | Stop reason: HOSPADM

## 2023-05-18 RX ORDER — SUMATRIPTAN 50 MG/1
50 TABLET, FILM COATED ORAL
Status: DISCONTINUED | OUTPATIENT
Start: 2023-05-18 | End: 2023-05-21 | Stop reason: HOSPADM

## 2023-05-18 RX ORDER — TESTOSTERONE GEL, 1% 10 MG/G
50 GEL TRANSDERMAL DAILY
Status: DISCONTINUED | OUTPATIENT
Start: 2023-05-19 | End: 2023-05-21 | Stop reason: HOSPADM

## 2023-05-18 RX ORDER — ACETAMINOPHEN 500 MG
1000 TABLET ORAL EVERY 8 HOURS PRN
Status: DISCONTINUED | OUTPATIENT
Start: 2023-05-18 | End: 2023-05-18

## 2023-05-18 RX ORDER — ASPIRIN 81 MG/1
81 TABLET ORAL DAILY
Status: DISCONTINUED | OUTPATIENT
Start: 2023-05-18 | End: 2023-05-21 | Stop reason: HOSPADM

## 2023-05-18 RX ORDER — LIDOCAINE 40 MG/G
CREAM TOPICAL
Status: DISCONTINUED | OUTPATIENT
Start: 2023-05-18 | End: 2023-05-21 | Stop reason: HOSPADM

## 2023-05-18 RX ORDER — CEFAZOLIN SODIUM 2 G/100ML
2 INJECTION, SOLUTION INTRAVENOUS EVERY 8 HOURS
Status: DISCONTINUED | OUTPATIENT
Start: 2023-05-18 | End: 2023-05-21 | Stop reason: HOSPADM

## 2023-05-18 RX ORDER — HYDROCHLOROTHIAZIDE 12.5 MG/1
37.5 CAPSULE ORAL DAILY
Status: DISCONTINUED | OUTPATIENT
Start: 2023-05-19 | End: 2023-05-21 | Stop reason: HOSPADM

## 2023-05-18 RX ORDER — NALOXONE HYDROCHLORIDE 0.4 MG/ML
0.4 INJECTION, SOLUTION INTRAMUSCULAR; INTRAVENOUS; SUBCUTANEOUS
Status: DISCONTINUED | OUTPATIENT
Start: 2023-05-18 | End: 2023-05-21 | Stop reason: HOSPADM

## 2023-05-18 RX ORDER — ONDANSETRON 2 MG/ML
4 INJECTION INTRAMUSCULAR; INTRAVENOUS EVERY 6 HOURS PRN
Status: DISCONTINUED | OUTPATIENT
Start: 2023-05-18 | End: 2023-05-21 | Stop reason: HOSPADM

## 2023-05-18 RX ORDER — AMOXICILLIN 250 MG
1 CAPSULE ORAL 2 TIMES DAILY PRN
Status: DISCONTINUED | OUTPATIENT
Start: 2023-05-18 | End: 2023-05-21 | Stop reason: HOSPADM

## 2023-05-18 RX ORDER — NALOXONE HYDROCHLORIDE 0.4 MG/ML
0.2 INJECTION, SOLUTION INTRAMUSCULAR; INTRAVENOUS; SUBCUTANEOUS
Status: DISCONTINUED | OUTPATIENT
Start: 2023-05-18 | End: 2023-05-21 | Stop reason: HOSPADM

## 2023-05-18 RX ORDER — METFORMIN HCL 500 MG
1000 TABLET, EXTENDED RELEASE 24 HR ORAL 2 TIMES DAILY WITH MEALS
Status: DISCONTINUED | OUTPATIENT
Start: 2023-05-18 | End: 2023-05-21 | Stop reason: HOSPADM

## 2023-05-18 RX ORDER — ONDANSETRON 4 MG/1
4 TABLET, ORALLY DISINTEGRATING ORAL EVERY 6 HOURS PRN
Status: DISCONTINUED | OUTPATIENT
Start: 2023-05-18 | End: 2023-05-21 | Stop reason: HOSPADM

## 2023-05-18 RX ORDER — ACETAMINOPHEN 650 MG/1
650 SUPPOSITORY RECTAL EVERY 6 HOURS PRN
Status: DISCONTINUED | OUTPATIENT
Start: 2023-05-18 | End: 2023-05-21 | Stop reason: HOSPADM

## 2023-05-18 RX ORDER — NITROGLYCERIN 0.4 MG/1
0.4 TABLET SUBLINGUAL EVERY 5 MIN PRN
Status: DISCONTINUED | OUTPATIENT
Start: 2023-05-18 | End: 2023-05-21 | Stop reason: HOSPADM

## 2023-05-18 RX ORDER — TAMSULOSIN HYDROCHLORIDE 0.4 MG/1
0.4 CAPSULE ORAL EVERY EVENING
Status: DISCONTINUED | OUTPATIENT
Start: 2023-05-18 | End: 2023-05-21 | Stop reason: HOSPADM

## 2023-05-18 RX ORDER — OXYCODONE AND ACETAMINOPHEN 5; 325 MG/1; MG/1
1 TABLET ORAL 2 TIMES DAILY
Status: DISCONTINUED | OUTPATIENT
Start: 2023-05-18 | End: 2023-05-18

## 2023-05-18 RX ORDER — ACETAMINOPHEN 325 MG/1
650 TABLET ORAL EVERY 6 HOURS PRN
Status: DISCONTINUED | OUTPATIENT
Start: 2023-05-18 | End: 2023-05-21 | Stop reason: HOSPADM

## 2023-05-18 RX ORDER — DULOXETIN HYDROCHLORIDE 30 MG/1
30 CAPSULE, DELAYED RELEASE ORAL 2 TIMES DAILY
Status: DISCONTINUED | OUTPATIENT
Start: 2023-05-18 | End: 2023-05-21 | Stop reason: HOSPADM

## 2023-05-18 RX ORDER — OXYCODONE HYDROCHLORIDE 5 MG/1
10 TABLET ORAL ONCE
Status: COMPLETED | OUTPATIENT
Start: 2023-05-18 | End: 2023-05-18

## 2023-05-18 RX ORDER — OXYCODONE HYDROCHLORIDE 5 MG/1
5 TABLET ORAL EVERY 4 HOURS PRN
Status: DISCONTINUED | OUTPATIENT
Start: 2023-05-18 | End: 2023-05-19

## 2023-05-18 RX ORDER — ENOXAPARIN SODIUM 100 MG/ML
40 INJECTION SUBCUTANEOUS EVERY 24 HOURS
Status: DISCONTINUED | OUTPATIENT
Start: 2023-05-18 | End: 2023-05-21 | Stop reason: HOSPADM

## 2023-05-18 RX ORDER — POLYETHYLENE GLYCOL 3350 17 G/17G
17 POWDER, FOR SOLUTION ORAL DAILY PRN
Status: DISCONTINUED | OUTPATIENT
Start: 2023-05-18 | End: 2023-05-21 | Stop reason: HOSPADM

## 2023-05-18 RX ORDER — LOSARTAN POTASSIUM 100 MG/1
100 TABLET ORAL DAILY
Status: DISCONTINUED | OUTPATIENT
Start: 2023-05-18 | End: 2023-05-21 | Stop reason: HOSPADM

## 2023-05-18 RX ORDER — NICOTINE POLACRILEX 4 MG
15-30 LOZENGE BUCCAL
Status: DISCONTINUED | OUTPATIENT
Start: 2023-05-18 | End: 2023-05-21 | Stop reason: HOSPADM

## 2023-05-18 RX ORDER — CEFAZOLIN SODIUM 2 G/100ML
2 INJECTION, SOLUTION INTRAVENOUS ONCE
Status: COMPLETED | OUTPATIENT
Start: 2023-05-18 | End: 2023-05-18

## 2023-05-18 RX ORDER — AMOXICILLIN 250 MG
1-2 CAPSULE ORAL 2 TIMES DAILY
Status: DISCONTINUED | OUTPATIENT
Start: 2023-05-18 | End: 2023-05-21 | Stop reason: HOSPADM

## 2023-05-18 RX ORDER — ALPRAZOLAM 0.25 MG
0.25 TABLET ORAL
Status: DISCONTINUED | OUTPATIENT
Start: 2023-05-18 | End: 2023-05-21 | Stop reason: HOSPADM

## 2023-05-18 RX ORDER — PROCHLORPERAZINE 25 MG
12.5 SUPPOSITORY, RECTAL RECTAL EVERY 12 HOURS PRN
Status: DISCONTINUED | OUTPATIENT
Start: 2023-05-18 | End: 2023-05-21 | Stop reason: HOSPADM

## 2023-05-18 RX ORDER — MULTIPLE VITAMINS W/ MINERALS TAB 9MG-400MCG
1 TAB ORAL DAILY
Status: DISCONTINUED | OUTPATIENT
Start: 2023-05-19 | End: 2023-05-21 | Stop reason: HOSPADM

## 2023-05-18 RX ORDER — BUPROPION HYDROCHLORIDE 150 MG/1
150 TABLET ORAL EVERY MORNING
Status: DISCONTINUED | OUTPATIENT
Start: 2023-05-19 | End: 2023-05-21 | Stop reason: HOSPADM

## 2023-05-18 RX ADMIN — DULOXETINE HYDROCHLORIDE 30 MG: 30 CAPSULE, DELAYED RELEASE ORAL at 20:44

## 2023-05-18 RX ADMIN — CEFAZOLIN SODIUM 2 G: 2 INJECTION, SOLUTION INTRAVENOUS at 22:19

## 2023-05-18 RX ADMIN — ATORVASTATIN CALCIUM 30 MG: 20 TABLET, FILM COATED ORAL at 20:44

## 2023-05-18 RX ADMIN — CEFAZOLIN SODIUM 2 G: 2 INJECTION, SOLUTION INTRAVENOUS at 14:36

## 2023-05-18 RX ADMIN — METOPROLOL SUCCINATE 50 MG: 50 TABLET, EXTENDED RELEASE ORAL at 20:43

## 2023-05-18 RX ADMIN — ASPIRIN 81 MG: 81 TABLET, COATED ORAL at 20:43

## 2023-05-18 RX ADMIN — OXYCODONE HYDROCHLORIDE 10 MG: 5 TABLET ORAL at 14:09

## 2023-05-18 RX ADMIN — SENNOSIDES AND DOCUSATE SODIUM 1 TABLET: 50; 8.6 TABLET ORAL at 20:43

## 2023-05-18 RX ADMIN — LOSARTAN POTASSIUM 100 MG: 100 TABLET, FILM COATED ORAL at 20:44

## 2023-05-18 RX ADMIN — ACETAMINOPHEN 650 MG: 325 TABLET ORAL at 20:44

## 2023-05-18 RX ADMIN — ALPRAZOLAM 0.25 MG: 0.25 TABLET ORAL at 22:15

## 2023-05-18 RX ADMIN — ENOXAPARIN SODIUM 40 MG: 40 INJECTION SUBCUTANEOUS at 20:43

## 2023-05-18 RX ADMIN — TAMSULOSIN HYDROCHLORIDE 0.4 MG: 0.4 CAPSULE ORAL at 20:44

## 2023-05-18 RX ADMIN — PREGABALIN 100 MG: 100 CAPSULE ORAL at 21:47

## 2023-05-18 RX ADMIN — INSULIN GLARGINE 12 UNITS: 100 INJECTION, SOLUTION SUBCUTANEOUS at 22:17

## 2023-05-18 RX ADMIN — METFORMIN ER 500 MG 1000 MG: 500 TABLET ORAL at 20:43

## 2023-05-18 RX ADMIN — OXYCODONE HYDROCHLORIDE 5 MG: 5 TABLET ORAL at 20:44

## 2023-05-18 ASSESSMENT — ACTIVITIES OF DAILY LIVING (ADL)
DIFFICULTY_EATING/SWALLOWING: NO
DRESSING/BATHING_DIFFICULTY: NO
DOING_ERRANDS_INDEPENDENTLY_DIFFICULTY: NO
ADLS_ACUITY_SCORE: 35
CONCENTRATING,_REMEMBERING_OR_MAKING_DECISIONS_DIFFICULTY: NO
ADLS_ACUITY_SCORE: 35
TOILETING_ISSUES: NO
WALKING_OR_CLIMBING_STAIRS_DIFFICULTY: NO
FALL_HISTORY_WITHIN_LAST_SIX_MONTHS: NO
ADLS_ACUITY_SCORE: 18
WEAR_GLASSES_OR_BLIND: NO
ADLS_ACUITY_SCORE: 35

## 2023-05-18 ASSESSMENT — ENCOUNTER SYMPTOMS
WEAKNESS: 1
CHILLS: 1
DIAPHORESIS: 1

## 2023-05-18 NOTE — ED PROVIDER NOTES
History     Chief Complaint:  Generalized Weakness and Shortness of Breath       The history is provided by the patient.      Parmjit Hernández is a 67 year old male who presents with generalized weakness and shortness of breath. The patient reports that since his discharge on 5/15/23 he has continued to experience weakness, fatigue, chills, and diaphoresis. He states that he did not fill his prescription for cefadroxil because he did not feel well enough to travel to the pharmacy. He states that he feels that his blood sugars have been elevated since discharge but he notes that he has not checked his blood sugar. He states that his pain pump was serviced today. He reports that when he checks his blood pressure at home it is usually 120/80 however, he notes that this morning in the clinic his blood pressure was 210/110. He does wear compression stockings.    Independent Historian:   History provided by the patient as noted in HPI.    Review of External Notes:  He was admitted on 5/11 and discharged on 5/15, 3 days ago. He has a history of type 2 diabetes, hypertension, CAD, diastolic heart failure, amyloidosis with prior bone marrow transplant, chronic pain with intrathecal pain pump. He was admitted for bilateral lower extremities cellulits in the setting of chronic lymphoedema and chronic leg wounds. He was discharge on cefadroxil, 500 mg, twice per day, for 14 days.    ROS:  Review of Systems   Constitutional: Positive for chills and diaphoresis.   Neurological: Positive for weakness.   All other systems reviewed and are negative.    Allergies:  Cephalexin  Liraglutide  Sulfa Drugs  Victoza      Medications:    Aspirin 81 mg  Atorvastatin  Bupropion  Cariprazine  Cefadroxil  Doxycycline hyclate  Dulaglutide  Duloxetine  Hydrochlorothiazide  Insulin glargine  Loperamide  Losartan  Metformin  Metoprolol succinate  "ER  Metronidazole  Modafinil  Nitroglycerin  Oxycodone-acetaminophen  Pregabalin  Senna-docusate  Sumatriptan  Tamsulosin    Past Medical History:    Acquired lymphedema   Allergic state   Amyloidosis   Bipolar I disorder  Degenerative disc disease, lumbar   Depressive disorder   EKG abnormality   H/O bone marrow transplant    Diverticulitis   Hyperlipidemia  Hypertension    Marianne   Morbid obesity   Narcotic dependence  NSTEMI  Obsessive compulsive disorder  Other acquired absence of organ   Other cirrhosis of liver, possibly from vences    Other specified viral warts   Peripheral edema   Polyneuropathy associated with underlying disease   Restless legs syndrome   Stasis dermatitis of both legs   Type 2 diabetes mellitus      Past Surgical History:    Abdominal hernia  Back surgery  Biopsy  Bone marrow biopsy  Cholecystectomy  Colonoscopy  EGD, combined   Umbilical hernia repair  Repair deviated septum      Family History:    Father: CAD, Hypertension, alcohol/drug abuse, alergies, circulatory problem, depression, respiratory problem, asthma  Mother: Cerebrovascular disease, CAD, hypertension, alcohol/drug abuse, arthritis  Brother: Diabetes  Sister: Allergies, depression, gynecologic problem    Social History:  The patient presents to the ED alone.  He arrived via private vehicle.  He states that he lives alone with his cat.  PCP: Sherwin Mejia     Physical Exam     Patient Vitals for the past 24 hrs:   BP Temp Temp src Pulse Resp SpO2 Height Weight   05/18/23 1630 (!) 162/99 -- -- 78 -- 95 % -- --   05/18/23 1602 (!) 157/112 -- -- 90 -- 96 % -- --   05/18/23 1600 (!) 172/106 -- -- 90 -- 97 % -- --   05/18/23 1515 -- -- -- 82 16 98 % -- --   05/18/23 1500 -- -- -- -- -- 95 % -- --   05/18/23 1424 -- -- -- -- -- 90 % -- --   05/18/23 1253 (!) 117/113 97.6  F (36.4  C) Oral 87 16 95 % 1.676 m (5' 6\") 106.6 kg (235 lb)        General: Resting uncomfortably on the gurney, elevated BMI.  Head:  The scalp, face, " and head appear normal  Eyes:  The pupils are equal, round, and reactive to light    There is no nystagmus    Extraocular muscles are intact    Conjunctivae and sclerae are normal  ENT:    The nose is normal    Pinnae are normal    The oropharynx is normal    Uvula is in the midline  Neck:  Normal range of motion    There is no rigidity noted    There is no midline cervical spine pain/tenderness    Trachea is in the midline    No mass is detected  CV:  Regular rate and underlying rhythm     Normal S1/S2, no S3/S4    No pathological murmur detected  Resp:  Lungs are clear    There is no tachypnea    Non-labored    No rales    No wheezing   GI:  Abdomen is soft, there is no rigidity    No distension    No tympani    No rebound tenderness     Non-surgical without peritoneal features  Left leg: There is erythema from the ankle to the proximal shin which is slightly warm, blanchable, and tender, consistent with cellulitis. There is a small superficial wound and noted in the picture below to the anterolateral lower shin.   Right Leg: There is pink erythema which is less warm and less tender than the left leg which may be consistant with chronic lymphoedema and residual mild cellulitis.    Skin:  See images below.  Neuro:  Speech is normal and fluent  Psych:  Awake. Alert.      Normal affect.  Appropriate interactions.                  Emergency Department Course     ECG  ECG taken at 1248, ECG read at 1323  Normal sinus rhythm.  Left axis deviation.  Incomplete right bundle branch block.  Minimal voltage criteria for LVH, may be normal variant (R in aVL).  Inferior infarct, age undetermined.  Anterolateral infarct, age undetermined.  No changes as compared to prior, dated 12/8/2020.  Rate 92 bpm. TN interval 192 ms. QRS duration 118 ms. QT/QTc 368/455 ms. P-R-T axes 24 -57 -2.     Imaging:  XR Chest 2 Views   Final Result   IMPRESSION: AP and lateral views of the chest were obtained.   Cardiomediastinal silhouette is  within normal limits. No suspicious   focal pulmonary opacities. No significant pleural effusion or   pneumothorax.      RAVI CONRAD MD            SYSTEM ID:  [id number]         Report per radiology    Laboratory:  Labs Ordered and Resulted from Time of ED Arrival to Time of ED Departure   COMPREHENSIVE METABOLIC PANEL - Abnormal       Result Value    Sodium 138      Potassium 4.1      Chloride 100      Carbon Dioxide (CO2) 29      Anion Gap 9      Urea Nitrogen 14.4      Creatinine 0.77      Calcium 9.2      Glucose 265 (*)     Alkaline Phosphatase 100      AST 31      ALT 26      Protein Total 6.5      Albumin 4.0      Bilirubin Total 0.7      GFR Estimate >90     ROUTINE UA WITH MICROSCOPIC REFLEX TO CULTURE - Abnormal    Color Urine Yellow      Appearance Urine Clear      Glucose Urine 300 (*)     Bilirubin Urine Negative      Ketones Urine 10 (*)     Specific Gravity Urine 1.026      Blood Urine Negative      pH Urine 6.0      Protein Albumin Urine 20 (*)     Urobilinogen Urine Normal      Nitrite Urine Negative      Leukocyte Esterase Urine Negative      Mucus Urine Present (*)     RBC Urine <1      WBC Urine <1     CBC WITH PLATELETS AND DIFFERENTIAL - Abnormal    WBC Count 4.2      RBC Count 4.33 (*)     Hemoglobin 14.1      Hematocrit 41.2      MCV 95      MCH 32.6      MCHC 34.2      RDW 13.6      Platelet Count 127 (*)     % Neutrophils 75      % Lymphocytes 12      % Monocytes 8      % Eosinophils 3      % Basophils 1      % Immature Granulocytes 1      NRBCs per 100 WBC 0      Absolute Neutrophils 3.2      Absolute Lymphocytes 0.5 (*)     Absolute Monocytes 0.3      Absolute Eosinophils 0.1      Absolute Basophils 0.0      Absolute Immature Granulocytes 0.0      Absolute NRBCs 0.0     TROPONIN T, HIGH SENSITIVITY - Abnormal    Troponin T, High Sensitivity 23 (*)    LACTIC ACID WHOLE BLOOD - Normal    Lactic Acid 0.7     NT PROBNP INPATIENT - Normal    N terminal Pro BNP Inpatient 253     BLOOD  CULTURE   BLOOD CULTURE      Emergency Department Course & Assessments:    Interventions:  Medications   ceFAZolin (ANCEF) 2 g in 100 mL D5W intermittent infusion (0 g Intravenous Stopped 5/18/23 1516)   oxyCODONE (ROXICODONE) tablet 10 mg (10 mg Oral $Given 5/18/23 1409)          Independent Interpretation (X-rays, CTs, rhythm strip):      Assessments/Consultations/Discussion of Management or Tests:  1323 I obtained history and examined the patient.  1614 I spoke again with the patient and explained laboratory and imaging findings. We discussed disposition options.  1646 I spoke on the phone with Dr. Ji, Hospitalist.       Disposition:  The patient will be admitted to the hospital under the care of Dr. Ji.    Impression & Plan      Medical Decision Making:  This patient presents to the emergency department with weakness, fatigue, malaise, ongoing leg redness, pain, and swelling with a recent admission for bilateral lower extremity cellulitis.  The patient notes that he never filled his oral antibiotics and therefore has not been taking anything for the last several days.  Blood work-up in the emergency department reveals a low detectable troponin, the patient has been noting some subacute to chronic intermittent dyspnea.  His BNP level is fairly low, there is no clinical features consistent with significant congestive heart failure at this time.  The patient is noted to have ongoing cellulitic changes in the lower extremities left greater than right not surprising since he has not been on antimicrobial therapy.  His white count and lactate are normal.  No evidence of severe sepsis or septic shock.  Patient is mainly concerned that he has not able to thrive at home and he has been too weak and fatigued to really get out of bed and to even get his medications from the pharmacy.  He will be placed in the hospital for additional intravenous antibiotics, PT, OT, and  consultation.  Perhaps we can  arrange to have the patient get his medication at the time of discharge from the hospital discharge pharmacy.  No other life-threatening etiologies are detected at this time.  Patient will be admitted to the hospitalist.      Diagnosis:    ICD-10-CM    1. Cellulitis of left lower extremity  L03.116              Scribe Disclosure:  Xiomara SPRINGER BS am serving as a scribe at 3:48 PM on 5/18/2023 to document services personally performed by Spike Andres MD based on my observations and the provider's statements to me.      Spike Andres MD  05/18/23 1915

## 2023-05-18 NOTE — ED TRIAGE NOTES
Patient was discharged a couple days ago after being hospitalized with cellulitis. Patient has not taken any antibiotics or any of his daily meds since discharge d/t feeling weak, tired, pain  and SOB.

## 2023-05-18 NOTE — H&P
Admitted: 05/18/2023    HISTORY OF PRESENT ILLNESS:  This is a 67-year-old male with history of lymphedema, anxiety, bipolar disorder, depression, hypertension, hyperlipidemia, obesity, cirrhosis secondary to MEJIAS, restless leg syndrome, polyneuropathy, diabetes mellitus type 2, migraine, sleep apnea, not compliant with CPAP, peripheral vascular disease, followed by [facility] and has intrathecal pump, which has fentanyl, bupivacaine, and morphine, just refilled today, was recently admitted in the hospital from 05/11-05/15 for bilateral lower extremity cellulitis and was discharged on 05/15, sent home on 2 weeks of oral antibiotics, but went home and did not take his medications and now comes here with weakness, fatigue, tiredness, and worsening cellulitis of the left leg.    According to the patient, he was not feeling well when he was discharged, he was fatigued and tired at that time, and then discharged.  He just went home and felt too tired and fatigued to go and fill up his medications, so he has not been taking any of his antibiotic that were prescribed.  He was prescribed cefadroxil and doxycycline for 2 weeks and he was not taking most of his medications as well, including insulin and metformin.  The patient continued not feeling well with chills, but no fever, fatigue, tiredness, body aches, and noticed that his leg has been now getting more red, mostly in the left leg and had pain in the left leg, so he decided to come to the ED.    The patient denies any chest pain, orthopnea, PND, palpitations, abdominal pain, dysuria, hematuria, constipation, diarrhea.  The patient's friend noticed that he is foggy.  He feels sometimes blurred vision as well, and not eating and drinking much in the home as he has felt too sick or too weak to do anything.    The patient lost his CPAP machine a few months ago and not been using that for some time.  He is not using his diabetic medications as well and blood sugars  are uncontrolled.  Rest of the review of system is negative at this time.    ASSESSMENT AND PLAN:     1.  Left lower extremity cellulitis:  This is a 67-year-old male who was recently admitted for bilateral lower extremity cellulitis, was treated with intravenous cefazolin and did improve significantly as reviewed on the pictures in Epic, but did not fill up his medications and his left leg is now worse.  He has erythema, warmth, tenderness, has nonhealing ulcers on the left shin as well.  Right leg has resolving cellulitis, very mild erythema, no warmth or tenderness, so the infection mostly is in the left lower extremity.  At this time, we will admit him.  We will resume his cefazolin 2 grams every 8 hours.  We will ask the wound nurse to see him and we will apply the lymphedema wraps and elevate his legs to improve his drainage and thus the infection.  2.  Generalized weakness/fatigue/tiredness/fogginess:  I think this is multifactorial, but the main reason is that he is not compliant with his CPAP and given his significant obesity, sleep apnea and worsened by his recent infection and hospitalization and uncontrolled diabetes are the cause of his symptoms.  I strongly suggest that he start using CPAP machine.  He needs to get the new one as he lost it.  We will use CPAP while he is here.  We will better control his diabetes and treat his infection, so hopefully he gets better.  In the meantime, we will have physical therapy and occupational therapy evaluate the patient.   will be consulted for financial issues, as well as safe disposition, as well as help in getting him the CPAP machine.  3.  Diabetes mellitus type 2, uncontrolled:  His blood sugar is 265.  He is not using his insulin or metformin at home.  We will resume his insulin.  He is on 10 units of Lantus at night.  I will put him on 12 units at night.  I will resume his metformin 1000 b.i.d.  I will put him on NovoLog 1 unit per 10 grams  of carbohydrates and keep him on sliding scale insulin for correction and hypoglycemia protocol.  4.  Bipolar disorder:  He has been stable.  He is on cariprazine and Cymbalta.  We will continue with that.  5.  Polyneuropathy/restless leg syndrome/chronic pain:  The patient is followed by [facility].  He has an intrathecal pump, which was refilled today, which includes fentanyl, morphine, and bupivacaine.  His pain is much better controlled.  In the meantime, we will continue with Cymbalta.  Continue with pregabalin and oxycodone as needed.  6.  Hypertension: On losartan 100 mg daily and hydrochlorothiazide.  I will continue with that.  He is on metoprolol 50 daily as well.  7.  Hyperlipidemia: On Lipitor.  We will continue with that.    8.  Morbid obesity and sleep apnea: Need to lose weight, diet, exercise, and use CPAP machine regularly.  9.  History of amyloidosis:  Followed outpatient with his primary care physician.  10.  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  11.  Thrombocytopenia: Slightly worse than his last admission.  We will keep an eye on it.  It may be because of the infection.  We will need to keep an eye on it and  follow.  If it drops further, then we will have hematology review him.  12.  Deep venous thrombosis prophylaxis:  Lovenox.    CODE STATUS:  Full code as per the patient wishes.    The case discussed with the ED physician and the nursing staff taking care of the patient.    Nicolás Ji MD        D: 2023   T: 2023   MT: erma    Name:     JULIO PRESCOTT  MRN:      [id number]        Account:     [id number]   :      1955           Admitted:    2023       Document: [id number]

## 2023-05-18 NOTE — ED NOTES
[facility]  ED Nurse Handoff Report    ED Chief complaint: Generalized Weakness and Shortness of Breath      ED Diagnosis:   Final diagnoses:   None       Code Status:  Admitting MD to assess.      Allergies:   Allergies   Allergen Reactions    Cephalexin Diarrhea    Liraglutide Other (See Comments), Headache and Unknown     Other reaction(s): Headache, Unknown  PN: migraines - Increased migraine frequency and severity      Sulfa Antibiotics Angioedema and Swelling     Pt has taken  Taken sulfa drugs orally without trouble. He had problems with Sulfa eye drops. Eye swelled up         Patient Story: Patient was discharged a couple days ago after being hospitalized with cellulitis. Patient has not taken any antibiotics or any of his daily meds since discharge d/t feeling weak, tired, pain  and SOB  Focused Assessment:  Patient is alert and oriented x 4, calm and cooperative. VS are stable, skin is warm and dry, respirations are even and non-labored on room air. Patient denied chest pain, shortness of breath, dizziness, and nausea.       Treatments and/or interventions provided:   Medications   ceFAZolin (ANCEF) 2 g in 100 mL D5W intermittent infusion (0 g Intravenous Stopped 5/18/23 1516)   oxyCODONE (ROXICODONE) tablet 10 mg (10 mg Oral $Given 5/18/23 4158)       Patient's response to treatments and/or interventions: Stable    To be done/followed up on inpatient unit:  Monitor    Does this patient have any cognitive concerns?:  None    Activity level - Baseline/Home:  Unknown  Activity Level - Current:   Stand with Assist    Patient's Preferred language: English   Needed?: No    Isolation: None  Infection: Not Applicable  Patient tested for COVID 19 prior to admission: NO  Bariatric?: No    Vital Signs:   Vitals:    05/18/23 1253 05/18/23 1424 05/18/23 1515   BP: (!) 117/113     Pulse: 87     Resp: 16     Temp: 97.6  F (36.4  C)     TempSrc: Oral     SpO2: 95% 90% 98%   Weight: 106.6 kg  "(235 lb)     Height: 1.676 m (5' 6\")         Cardiac Rhythm:     Was the PSS-3 completed:   Yes  What interventions are required if any?               Family Comments: No family present at this time.    OBS brochure/video discussed/provided to patient/family: Yes              Name of person given brochure if not patient: NA              Relationship to patient: NA    For the majority of the shift this patient's behavior was Green.   Behavioral interventions performed were  information and reassurance provided.  .    ED NURSE PHONE NUMBER: [phone]           "

## 2023-05-18 NOTE — H&P
[facility]    History and Physical  Hospitalist       Date of Admission:  5/18/2023    Assessment & Plan     This is a 67-year-old male with history of lymphedema, anxiety, bipolar disorder, depression, hypertension, hyperlipidemia, obesity, cirrhosis secondary to MEJIAS, restless leg syndrome, polyneuropathy, diabetes mellitus type 2, migraine, sleep apnea, not compliant with CPAP, peripheral vascular disease, followed by [facility] and has intrathecal pump, which has fentanyl, bupivacaine, and morphine, just refilled today, was recently admitted in the hospital from 05/11-05/15 for bilateral lower extremity cellulitis and was discharged on 05/15, sent home on 2 weeks of oral antibiotics, but went home and did not take his medications and now comes here with weakness, fatigue, tiredness, and worsening cellulitis of the left leg.      ASSESSMENT AND PLAN:     1.  Left lower extremity cellulitis:  This is a 67-year-old male who was recently admitted for bilateral lower extremity cellulitis, was treated with intravenous cefazolin and did improve significantly as reviewed on the pictures in Epic, but did not fill up his medications and his left leg is now worse.  He has erythema, warmth, tenderness, has nonhealing ulcers on the left shin as well.  Right leg has resolving cellulitis, very mild erythema, no warmth or tenderness, so the infection mostly is in the left lower extremity.  At this time, we will admit him.  We will resume his cefazolin 2 grams every 8 hours.  We will ask the wound nurse to see him and we will apply the lymphedema wraps and elevate his legs to improve his drainage and thus the infection.  2.  Generalized weakness/fatigue/tiredness/fogginess:  I think this is multifactorial, but the main reason is that he is not compliant with his CPAP and given his significant obesity, sleep apnea and worsened by his recent infection and hospitalization and uncontrolled diabetes  are the cause of his symptoms.  I strongly suggest that he start using CPAP machine.  He needs to get the new one as he lost it.  We will use CPAP while he is here.  We will better control his diabetes and treat his infection, so hopefully he gets better.  In the meantime, we will have physical therapy and occupational therapy evaluate the patient.   will be consulted for financial issues, as well as safe disposition, as well as help in getting him the CPAP machine.  3.  Diabetes mellitus type 2, uncontrolled:  His blood sugar is 265.  He is not using his insulin or metformin at home.  We will resume his insulin.  He is on 10 units of Lantus at night.  I will put him on 12 units at night.  I will resume his metformin 1000 b.i.d.  I will put him on NovoLog 1 unit per 10 grams of carbohydrates and keep him on sliding scale insulin for correction and hypoglycemia protocol.  4.  Bipolar disorder:  He has been stable.  He is on cariprazine and Cymbalta.  We will continue with that.  5.  Polyneuropathy/restless leg syndrome/chronic pain:  The patient is followed by [facility].  He has an intrathecal pump, which was refilled today, which includes fentanyl, morphine, and bupivacaine.  His pain is much better controlled.  In the meantime, we will continue with Cymbalta.  Continue with pregabalin and oxycodone as needed.  6.  Hypertension: On losartan 100 mg daily and hydrochlorothiazide.  I will continue with that.  He is on metoprolol 50 daily as well.  7.  Hyperlipidemia: On Lipitor.  We will continue with that.    8.  Morbid obesity and sleep apnea: Need to lose weight, diet, exercise, and use CPAP machine regularly.  9.  History of amyloidosis:  Followed outpatient with his primary care physician.  10.  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  11.  Thrombocytopenia: Slightly worse than his last admission.  We will keep an eye on it.  It may be  because of the infection.  We will need to keep an eye on it and  follow.  If it drops further, then we will have hematology review him.  12.  Deep venous thrombosis prophylaxis:  Lovenox.    CODE STATUS:  Full code as per the patient wishes.    The case discussed with the ED physician and the nursing staff taking care of the patient.    Nicolás Ji MD  DVT Prophylaxis: Enoxaparin (Lovenox) SQ  Code Status: Full Code    Disposition: Expected discharge in 2 days once stable.    Nicolás Ji MD, MD    Primary Care Physician   Sherwin Mcdermott    Chief Complaint   Weak, fatigued, left leg redness, chills     History is obtained from the patient    History of Present Illness   Admitted: 05/18/2023    HISTORY OF PRESENT ILLNESS:  This is a 67-year-old male with history of lymphedema, anxiety, bipolar disorder, depression, hypertension, hyperlipidemia, obesity, cirrhosis secondary to MEJIAS, restless leg syndrome, polyneuropathy, diabetes mellitus type 2, migraine, sleep apnea, not compliant with CPAP, peripheral vascular disease, followed by [facility] and has intrathecal pump, which has fentanyl, bupivacaine, and morphine, just refilled today, was recently admitted in the hospital from 05/11-05/15 for bilateral lower extremity cellulitis and was discharged on 05/15, sent home on 2 weeks of oral antibiotics, but went home and did not take his medications and now comes here with weakness, fatigue, tiredness, and worsening cellulitis of the left leg.    According to the patient, he was not feeling well when he was discharged, he was fatigued and tired at that time, and then discharged.  He just went home and felt too tired and fatigued to go and fill up his medications, so he has not been taking any of his antibiotic that were prescribed.  He was prescribed cefadroxil and doxycycline for 2 weeks and he was not taking most of his medications as well, including insulin and metformin.  The patient continued not  feeling well with chills, but no fever, fatigue, tiredness, body aches, and noticed that his leg has been now getting more red, mostly in the left leg and had pain in the left leg, so he decided to come to the ED.    The patient denies any chest pain, orthopnea, PND, palpitations, abdominal pain, dysuria, hematuria, constipation, diarrhea.  The patient's friend noticed that he is foggy.  He feels sometimes blurred vision as well, and not eating and drinking much in the home as he has felt too sick or too weak to do anything.    The patient lost his CPAP machine a few months ago and not been using that for some time.  He is not using his diabetic medications as well and blood sugars are uncontrolled.  Rest of the review of system is negative at this time.    Past Medical History    I have reviewed this patient's medical history and updated it with pertinent information if needed.   Past Medical History:   Diagnosis Date     Acquired lymphedema 2/4/2019     Allergic state      Amyloidosis (H)     followed by hematology Dr. Romeo status post peripheral stem cell transplant     Anxiety 5/11/2016     Anxiety and depression 12/18/2013     Bipolar I disorder (H) 9/1/2015    Under care of psychiatrist [facility]- Dr Rahman doxepin 25 mg, 2 cap bedtime DULoxetine 20 mg once daily  OLANZapine 7.5 mg  1 tab bedtime      DDD (degenerative disc disease), lumbar 8/6/2020     Depressive disorder 1995     EKG abnormality 4/20/2020     H/O bone marrow transplant (H)      Headache(784.0)      History of diverticulitis 1/27/2015    1/15-rxed with antibiotics      Hyperlipidemia LDL goal <100 10/31/2010     Hypertension 2007     Hypertension goal BP (blood pressure) < 140/90 10/11/2011     Marianne (H) 8/20/2015     Morbid obesity (H) 8/28/2018     Myalgia and myositis, unspecified      Narcotic dependence (H) 9/6/2016    None in about 6 months- 8/1/17     NSTEMI (non-ST elevated myocardial infarction) (H) 04/19/2020     OCD (obsessive  compulsive disorder)      Other acquired absence of organ 94     Other cirrhosis of liver (H) possibly from vences  4/15/2020     Other specified viral warts      Peripheral edema 5/20/2018    Noncardiac Continue with  furosemide (LASIX) 20 MG tablet,  potassium       chloride SA (K-DUR/KLOR-CON M) 10 MEQ CR  Tab once daily  Basic metabolic panel     Polyneuropathy associated with underlying disease (H) 2/4/2019     Restless legs syndrome (RLS) 6/29/2017     Stasis dermatitis of both legs 12/31/2018     Type 2 diabetes mellitus with other specified complication (H) 7/10/2017       Past Surgical History   I have reviewed this patient's surgical history and updated it with pertinent information if needed.  Past Surgical History:   Procedure Laterality Date     ABDOMEN SURGERY  2007    abdominal hernia     BACK SURGERY  8/6/2020     BIOPSY  june 2015    negative     BMT PROTOCOL      for systemic amyloidosis     BONE MARROW BIOPSY, BONE SPECIMEN, NEEDLE/TROCAR N/A 6/8/2016    Procedure: BIOPSY BONE MARROW;  Surgeon: Nathan Agrawal MD;  Location:  GI     BONE MARROW BIOPSY, BONE SPECIMEN, NEEDLE/TROCAR N/A 2/20/2019    Procedure: BIOPSY BONE MARROW;  Surgeon: Michael Raygoza MD;  Location:  GI     CHOLECYSTECTOMY  1995    lap qian     COLONOSCOPY  2012    hx polyps     ESOPHAGOSCOPY, GASTROSCOPY, DUODENOSCOPY (EGD), COMBINED N/A 5/29/2015    Procedure: COMBINED ESOPHAGOSCOPY, GASTROSCOPY, DUODENOSCOPY (EGD), BIOPSY SINGLE OR MULTIPLE;  Surgeon: John Jacob MD;  Location:  GI     HERNIA REPAIR, UMBILICAL  2006     [facility] NONSPECIFIC PROCEDURE  94    Cholecystectomy     [facility] NONSPECIFIC PROCEDURE  2000    repair deviated septum       Prior to Admission Medications   Prior to Admission Medications   Prescriptions Last Dose Informant Patient Reported? Taking?   Bioflavonoid Products (CANDIDO-C) TABS  Self No Yes   Sig: Take 1,000 mg by mouth daily   DULoxetine (CYMBALTA) 30 MG capsule  Self Yes  "Yes   Sig: Take 30 mg by mouth 2 times daily   Folic Acid-Vit B6-Vit B12 0.5-5-0.2 MG TABS  Self No Yes   Sig: Take 1 tablet by mouth daily   OVER-THE-COUNTER  Self Yes Yes   Sig: Take 3 capsules by mouth daily Supplement for brain health, unknown brand/ingredients   POTASSIUM PO  Self Yes Yes   Sig: Take 1 tablet by mouth daily Unspecified tablet strength; patient estimated \"600 mg, +/- 200 mg\", takes for leg cramps   SUMAtriptan (IMITREX) 50 MG tablet  Self No Yes   Sig: Take 1 tablet (50 mg) by mouth at onset of headache for migraine May repeat in 2 hours. Max 4 tablets/24 hours.   acetaminophen (TYLENOL) 500 MG tablet  Self Yes Yes   Sig: Take 1,000 mg by mouth every 8 hours as needed   ammonium lactate (AMLACTIN) 12 % external cream  Self No Yes   Sig: Apply topically daily To feet and legs daily & as needed   aspirin (ASPIRIN LOW DOSE) 81 MG tablet  Self No Yes   Sig: Take 1 tablet (81 mg) by mouth daily   atorvastatin (LIPITOR) 20 MG tablet  Self No Yes   Sig: Take 1.5 tablets (30 mg) by mouth daily   blood glucose (NO BRAND SPECIFIED) lancets standard   No No   Sig: Use to test blood sugar 1 time daily or as directed.   blood glucose (NO BRAND SPECIFIED) test strip   No No   Sig: Use to test blood sugar 1 time daily or as directed.   buPROPion (WELLBUTRIN XL) 150 MG 24 hr tablet  Self Yes Yes   Sig: Take 150 mg by mouth every morning   calcium carbonate (OS-LUIS) 1500 (600 Ca) MG tablet  Self Yes Yes   Sig: Take 600 mg by mouth daily   cariprazine (VRAYLAR) 4.5 MG capsule  Self Yes Yes   Sig: Take 4.5 mg by mouth daily   cefadroxil (DURICEF) 500 MG capsule   No Yes   Sig: Take 1 capsule (500 mg) by mouth 2 times daily for 14 days   doxycycline hyclate (VIBRAMYCIN) 100 MG capsule  Self No Yes   Sig: Take 1 capsule (100 mg) by mouth 2 times daily for 30 days   dulaglutide (TRULICITY) 0.75 MG/0.5ML pen  at Not taking Self No No   Sig: Inject 0.75 mg Subcutaneous every 7 days   Patient not taking: Reported on " 3/21/2023   glucose 40 % (400 mg/mL) gel  Self No Yes   Sig: Take 15-30 g by mouth every 15 minutes as needed for low blood sugar   hydrochlorothiazide (HYDRODIURIL) 25 MG tablet  Self No Yes   Sig: Take 1.5 tablets (37.5 mg) by mouth daily   insulin glargine (LANTUS PEN) 100 UNIT/ML pen  Self No Yes   Sig: Inject 10 Units Subcutaneous At Bedtime   loperamide (IMODIUM) 2 MG capsule  Self No Yes   Sig: Take 1 capsule (2 mg) by mouth 4 times daily as needed for diarrhea   losartan (COZAAR) 100 MG tablet  Self No Yes   Sig: Take 1 tablet (100 mg) by mouth daily   medication given by implanted intrathecal pump  Other Yes Yes   Sig: continuous Drug # 1: Fentanyl (Sublimaze) - Conc: 2000 mcg/mL - Total Dose / 24 hours: 1663.3 mcg  Drug # 2: Bupivacaine (Marcaine)  - Conc: 20 mg/mL - Total Dose / 24 hours: 16.633 mg  Drug # 3: Morphine (Duramorph or Infumorph)  - Conc: 9 mg/mL - Total Dose / 24 hours: 7.484 mg    Rate: 0.0325 mL/hr  Pump Reservoir Volume: 40 mL  Pump Reservoir Alarm Volume: 2 ml  Outside Clinic & Provider: Dr. Pawan Shaw [facility]  Last Refill Date: 5/18/2023  Next Refill Date: 6/25/2023  Low Merkel Alarm Date:  Pump : Medtronic SynchroMed II    Boluses: Up to 3 per day, 3 minute duration. Lock-out every 6 hours  Fentanyl: 99.9 mcg/dose  Bupivacaine: 0.999 mg/dose  Morphine: 0.4497 mg/dose   metFORMIN (GLUCOPHAGE XR) 500 MG 24 hr tablet  Self Yes Yes   Sig: Take 2 tablets (1,000 mg) by mouth 2 times daily (with meals)   metoprolol succinate ER (TOPROL XL) 50 MG 24 hr tablet  Self Yes Yes   Sig: Take 50 mg by mouth daily   metroNIDAZOLE (FLAGYL) 500 MG tablet  Self No Yes   Sig: Crush and sprinkle light layer over right leg, left leg and right toe wounds daily   modafinil (PROVIGIL) 200 MG tablet   No Yes   Sig: Take 1.5 tablets (300 mg) by mouth daily   multivitamin w/minerals (THERA-VIT-M) tablet  Self Yes Yes   Sig: Take 1 tablet by mouth daily Men's 50+   nitroGLYcerin  (NITROSTAT) 0.4 MG sublingual tablet  Self Yes Yes   Sig: Place 0.4 mg under the tongue every 5 minutes as needed for chest pain For chest pain place 1 tablet under the tongue every 5 minutes for 3 doses. If symptoms persist 5 minutes after 1st dose call 911.   oxyCODONE-acetaminophen (PERCOCET) 5-325 MG tablet  Self Yes Yes   Sig: Take 1 tablet by mouth 2 times daily   pregabalin (LYRICA) 100 MG capsule  Self Yes Yes   Sig: Take 100 mg by mouth Take up to 4 times per day   senna-docusate (SENOKOT-S/PERICOLACE) 8.6-50 MG tablet  Self Yes Yes   Sig: Take 1-2 tablets by mouth 2 times daily At baseline, Take 1 tablet twice daily. May take second tablet if needed.   tamsulosin (FLOMAX) 0.4 MG capsule  Self No Yes   Sig: Take 1 capsule (0.4 mg) by mouth 2 times daily   testosterone (ANDROGEL/TESTIM) 50 MG/5GM (1%) topical gel  Self Yes Yes   Sig: Place 50 mg of testosterone onto the skin daily   vitamin B-Complex  Self Yes Yes   Sig: Take 3 tablets by mouth daily   vitamin D3 (CHOLECALCIFEROL) 1.25 MG (98982 UT) capsule  Self No Yes   Sig: Take 1 capsule (50,000 Units) by mouth every 7 days      Facility-Administered Medications: None     Allergies   Allergies   Allergen Reactions     Cephalexin Diarrhea     Liraglutide Other (See Comments), Headache and Unknown     Other reaction(s): Headache, Unknown  PN: migraines - Increased migraine frequency and severity       Sulfa Antibiotics Angioedema and Swelling     Pt has taken  Taken sulfa drugs orally without trouble. He had problems with Sulfa eye drops. Eye swelled up         Social History   I have reviewed this patient's social history and updated it with pertinent information if needed. Parmjit VILLEDA Betty  reports that he has never smoked. He has never used smokeless tobacco. He reports that he does not currently use alcohol. He reports that he does not currently use drugs after having used the following drugs: Cocaine, Marijuana, Methamphetamines, Opiates, IV,  Amphetamines, Barbiturates, Benzodiazepines, Codeine, Fentanyl, Hashish, Hydrocodone, Hydromorphone, LSD, and Oxycodone.    Family History   I have reviewed this patient's family history and updated it with pertinent information if needed.   Family History   Problem Relation Age of Onset     Cerebrovascular Disease Mother      C.A.D. Mother      Hypertension Mother      Alcohol/Drug Mother      Arthritis Mother      Cerebrovascular Disease Maternal Grandmother      C.A.D. Maternal Grandmother      Alcohol/Drug Maternal Grandmother      C.A.D. Father      Heart Disease Father      Hypertension Father      Alcohol/Drug Father      Allergies Father      Circulatory Father      Depression Father      Respiratory Father      Asthma Father      Alcohol/Drug Paternal Grandfather      Cerebrovascular Disease Paternal Grandfather      Depression Son      Psychotic Disorder Son         anxiety disorder     Depression Daughter      Cerebrovascular Disease Maternal Grandfather      Cerebrovascular Disease Paternal Grandmother      Diabetes Brother      Allergies Sister      Depression Sister      Gynecology Sister        Review of Systems   CONSTITUTIONAL:  positive for  chills, fatigue and malaise  EYES:  negative  HEENT:  negative  RESPIRATORY:  positive for  dyspnea  CARDIOVASCULAR:  negative  GASTROINTESTINAL:  negative  GENITOURINARY:  negative  INTEGUMENT/BREAST:  negative  HEMATOLOGIC/LYMPHATIC:  negative  ALLERGIC/IMMUNOLOGIC:  negative  ENDOCRINE:  negative  MUSCULOSKELETAL:  positive for  myalgias, arthralgias and pain  NEUROLOGICAL:  negative  BEHAVIOR/PSYCH:  negative    Physical Exam   Temp: 97.6  F (36.4  C) Temp src: Oral BP: (!) 162/99 Pulse: 78   Resp: 16 SpO2: 95 % O2 Device: None (Room air)    Vital Signs with Ranges  Temp:  [97.6  F (36.4  C)] 97.6  F (36.4  C)  Pulse:  [78-90] 78  Resp:  [16] 16  BP: (117-172)/() 162/99  SpO2:  [90 %-98 %] 95 %  235 lbs 0 oz    Constitutional: Lethargic, fatigued, but  cooperative, no apparent distress.  Eyes: Conjunctiva and pupils examined and normal.  HEENT: dry mucous membranes, normal dentition.  Respiratory: Clear to auscultation bilaterally, no crackles or wheezing.  Cardiovascular: Regular rate and rhythm, normal S1 and S2, and no murmur noted.  GI: Soft, non-distended, non-tender, normal bowel sounds.  Lymph/Hematologic: No anterior cervical or supraclavicular adenopathy.  Skin: , no cyanosis, +ve LE  Edema, there is erythema, warmth, tenderness to left Lower extremity and non healing ulcer on the leg as picture below, right leg has very mild discoloration, no warmth or tenderness likely because of venous stasis and lymphedema or resolving cellulitis. .  Musculoskeletal: No joint swelling, erythema or tenderness.  Neurologic: Cranial nerves 2-12 intact, normal strength and sensation.  Psychiatric: Alert, oriented to person, place and time, no obvious anxiety or depression.            Data   Data reviewed today:  I personally reviewed the EKG tracing showing Sinus rhythm   Left axis deviation, Incomplete right bundle branch block, no acute ischemic changes.     Recent Labs   Lab 05/18/23  1436 05/15/23  1152 05/15/23  0837 05/15/23  0705 05/12/23  0733 05/12/23  0634   WBC 4.2  --   --   --   --  6.1   HGB 14.1  --   --   --   --  13.8   MCV 95  --   --   --   --  92   *  --   --   --   --  148*     --   --   --   --  133*   POTASSIUM 4.1  --   --   --   --  3.4   CHLORIDE 100  --   --   --   --  95*   CO2 29  --   --   --   --  27   BUN 14.4  --   --   --   --  16.1   CR 0.77  --  0.88  --   --  0.85   ANIONGAP 9  --   --   --   --  11   LUIS 9.2  --   --   --   --  8.9   * 165*  --  235*   < > 267*   ALBUMIN 4.0  --   --   --   --   --    PROTTOTAL 6.5  --   --   --   --   --    BILITOTAL 0.7  --   --   --   --   --    ALKPHOS 100  --   --   --   --   --    ALT 26  --   --   --   --   --    AST 31  --   --   --   --   --     < > = values in this  interval not displayed.       Recent Results (from the past 24 hour(s))   XR Chest 2 Views    Narrative    CHEST TWO VIEWS    5/18/2023 2:22 PM     HISTORY: Dyspnea, infiltrate.    COMPARISON: Chest x-ray on 12/5/2020      Impression    IMPRESSION: AP and lateral views of the chest were obtained.  Cardiomediastinal silhouette is within normal limits. No suspicious  focal pulmonary opacities. No significant pleural effusion or  pneumothorax.    RAVI CONRAD MD         SYSTEM ID:  [id number]

## 2023-05-18 NOTE — PHARMACY-ADMISSION MEDICATION HISTORY
Pharmacist Admission Medication History    Admission medication history is complete. The information provided in this note is only as accurate as the sources available at the time of the update.    Medication reconciliation/reorder completed by provider prior to medication history? No    Information Source(s): Patient, CareEverywhere/Marie and [facility] via in-person    Pertinent Information: Patient recently discharged from [facility] 5/15. Has not been able to take PTA medications since recent hospital discharge.    Changes made to PTA medication list:    Added: None    Deleted: None    Changed: Fentanyl, bupivacaine and morphine max daily dose changed per [facility] (patient seen 5/18 for refill prior to admission)    Medication Affordability:       Allergies reviewed with patient and updates made in EHR: unable to assess    Medication History Completed By: VIRAL MCKEON [facility] 5/18/2023 5:09 PM    Prior to Admission medications    Medication Sig Last Dose Taking? Auth Provider Long Term End Date   acetaminophen (TYLENOL) 500 MG tablet Take 1,000 mg by mouth every 8 hours as needed  Yes Reported, Patient     ammonium lactate (AMLACTIN) 12 % external cream Apply topically daily To feet and legs daily & as needed  Yes Regino Stanford MD     aspirin (ASPIRIN LOW DOSE) 81 MG tablet Take 1 tablet (81 mg) by mouth daily  Yes Vince Henry MD No    atorvastatin (LIPITOR) 20 MG tablet Take 1.5 tablets (30 mg) by mouth daily  Yes Vince Henry MD Yes    Bioflavonoid Products (CANDIDO-C) TABS Take 1,000 mg by mouth daily  Yes Regino Stanford MD     buPROPion (WELLBUTRIN XL) 150 MG 24 hr tablet Take 150 mg by mouth every morning  Yes Unknown, Entered By History Yes    calcium carbonate (OS-LUIS) 1500 (600 Ca) MG tablet Take 600 mg by mouth daily  Yes Unknown, Entered By History     cariprazine (VRAYLAR) 4.5 MG capsule Take 4.5 mg by mouth daily  Yes Unknown, Entered By History     cefadroxil (DURICEF) 500 MG  capsule Take 1 capsule (500 mg) by mouth 2 times daily for 14 days  Yes Ivan Jimenez MD  5/28/23   doxycycline hyclate (VIBRAMYCIN) 100 MG capsule Take 1 capsule (100 mg) by mouth 2 times daily for 30 days  Yes Regino Stanford MD  6/9/23   DULoxetine (CYMBALTA) 30 MG capsule Take 30 mg by mouth 2 times daily  Yes Reported, Patient     Folic Acid-Vit B6-Vit B12 0.5-5-0.2 MG TABS Take 1 tablet by mouth daily  Yes Regino Stanford MD     glucose 40 % (400 mg/mL) gel Take 15-30 g by mouth every 15 minutes as needed for low blood sugar  Yes Dashawn Franklin DO     hydrochlorothiazide (HYDRODIURIL) 25 MG tablet Take 1.5 tablets (37.5 mg) by mouth daily  Yes Maribeth Ardon MD Yes    insulin glargine (LANTUS PEN) 100 UNIT/ML pen Inject 10 Units Subcutaneous At Bedtime  Yes Dashawn Franklin DO Yes    loperamide (IMODIUM) 2 MG capsule Take 1 capsule (2 mg) by mouth 4 times daily as needed for diarrhea  Yes Dashawn Franklin DO     losartan (COZAAR) 100 MG tablet Take 1 tablet (100 mg) by mouth daily  Yes Rodrick Ferrer MD Yes    medication given by implanted intrathecal pump continuous Drug # 1: Fentanyl (Sublimaze) - Conc: 2000 mcg/mL - Total Dose / 24 hours: 1663.3 mcg  Drug # 2: Bupivacaine (Marcaine)  - Conc: 20 mg/mL - Total Dose / 24 hours: 16.633 mg  Drug # 3: Morphine (Duramorph or Infumorph)  - Conc: 9 mg/mL - Total Dose / 24 hours: 7.484 mg    Rate: 0.0325 mL/hr  Pump Reservoir Volume: 40 mL  Pump Reservoir Alarm Volume: 2 ml  Outside Clinic & Provider: [facility]  Last Refill Date: 5/18/2023  Next Refill Date: 6/25/2023  [place] Alarm Date:  Pump : Medtronic SynchroMed II    Boluses: Up to 3 per day, 3 minute duration. Lock-out every 6 hours  Fentanyl: 99.9 mcg/dose  Bupivacaine: 0.999 mg/dose  Morphine: 0.4497 mg/dose  Yes Unknown, Entered By History     metFORMIN (GLUCOPHAGE XR) 500 MG 24 hr tablet Take 2 tablets (1,000 mg) by mouth 2 times  "daily (with meals)  Yes Maribeth Ardon MD Yes    metoprolol succinate ER (TOPROL XL) 50 MG 24 hr tablet Take 50 mg by mouth daily  Yes Unknown, Entered By History No    metroNIDAZOLE (FLAGYL) 500 MG tablet Crush and sprinkle light layer over right leg, left leg and right toe wounds daily  Yes Regino Stanford MD     modafinil (PROVIGIL) 200 MG tablet Take 1.5 tablets (300 mg) by mouth daily  Yes Ivan Jimenez MD     multivitamin w/minerals (THERA-VIT-M) tablet Take 1 tablet by mouth daily Men's 50+  Yes Unknown, Entered By History     nitroGLYcerin (NITROSTAT) 0.4 MG sublingual tablet Place 0.4 mg under the tongue every 5 minutes as needed for chest pain For chest pain place 1 tablet under the tongue every 5 minutes for 3 doses. If symptoms persist 5 minutes after 1st dose call 911.  Yes Unknown, Entered By History     OVER-THE-COUNTER Take 3 capsules by mouth daily Supplement for brain health, unknown brand/ingredients  Yes Unknown, Entered By History     oxyCODONE-acetaminophen (PERCOCET) 5-325 MG tablet Take 1 tablet by mouth 2 times daily  Yes Unknown, Entered By History     POTASSIUM PO Take 1 tablet by mouth daily Unspecified tablet strength; patient estimated \"600 mg, +/- 200 mg\", takes for leg cramps  Yes Unknown, Entered By History     pregabalin (LYRICA) 100 MG capsule Take 100 mg by mouth Take up to 4 times per day  Yes Unknown, Entered By History No    senna-docusate (SENOKOT-S/PERICOLACE) 8.6-50 MG tablet Take 1-2 tablets by mouth 2 times daily At baseline, Take 1 tablet twice daily. May take second tablet if needed.  Yes Unknown, Entered By History     SUMAtriptan (IMITREX) 50 MG tablet Take 1 tablet (50 mg) by mouth at onset of headache for migraine May repeat in 2 hours. Max 4 tablets/24 hours.  Yes Maribeth Ardon MD     tamsulosin (FLOMAX) 0.4 MG capsule Take 1 capsule (0.4 mg) by mouth 2 times daily  Yes Vince Henry MD     testosterone (ANDROGEL/TESTIM) 50 MG/5GM (1%) " topical gel Place 50 mg of testosterone onto the skin daily  Yes Unknown, Entered By History Yes    vitamin B-Complex Take 3 tablets by mouth daily  Yes Unknown, Entered By History     vitamin D3 (CHOLECALCIFEROL) 1.25 MG (95971 UT) capsule Take 1 capsule (50,000 Units) by mouth every 7 days  Yes Regino Stanford MD     blood glucose (NO BRAND SPECIFIED) lancets standard Use to test blood sugar 1 time daily or as directed.   Vince Henry MD     blood glucose (NO BRAND SPECIFIED) test strip Use to test blood sugar 1 time daily or as directed.   Vince Henry MD     dulaglutide (TRULICITY) 0.75 MG/0.5ML pen Inject 0.75 mg Subcutaneous every 7 days  Patient not taking: Reported on 3/21/2023  at Not taking  Sherwin Mejia, DO

## 2023-05-19 LAB
ALBUMIN SERPL BCG-MCNC: 3.9 G/DL (ref 3.5–5.2)
ANION GAP SERPL CALCULATED.3IONS-SCNC: 10 MMOL/L (ref 7–15)
BASOPHILS # BLD AUTO: 0 10E3/UL (ref 0–0.2)
BASOPHILS NFR BLD AUTO: 1 %
BUN SERPL-MCNC: 16.3 MG/DL (ref 8–23)
CALCIUM SERPL-MCNC: 9 MG/DL (ref 8.8–10.2)
CHLORIDE SERPL-SCNC: 102 MMOL/L (ref 98–107)
CREAT SERPL-MCNC: 0.79 MG/DL (ref 0.67–1.17)
DEPRECATED HCO3 PLAS-SCNC: 26 MMOL/L (ref 22–29)
EOSINOPHIL # BLD AUTO: 0.2 10E3/UL (ref 0–0.7)
EOSINOPHIL NFR BLD AUTO: 3 %
ERYTHROCYTE [DISTWIDTH] IN BLOOD BY AUTOMATED COUNT: 13.7 % (ref 10–15)
GFR SERPL CREATININE-BSD FRML MDRD: >90 ML/MIN/1.73M2
GLUCOSE BLDC GLUCOMTR-MCNC: 150 MG/DL (ref 70–99)
GLUCOSE BLDC GLUCOMTR-MCNC: 187 MG/DL (ref 70–99)
GLUCOSE BLDC GLUCOMTR-MCNC: 197 MG/DL (ref 70–99)
GLUCOSE BLDC GLUCOMTR-MCNC: 257 MG/DL (ref 70–99)
GLUCOSE BLDC GLUCOMTR-MCNC: 262 MG/DL (ref 70–99)
GLUCOSE SERPL-MCNC: 224 MG/DL (ref 70–99)
HCT VFR BLD AUTO: 39.4 % (ref 40–53)
HGB BLD-MCNC: 13.6 G/DL (ref 13.3–17.7)
IMM GRANULOCYTES # BLD: 0 10E3/UL
IMM GRANULOCYTES NFR BLD: 0 %
LYMPHOCYTES # BLD AUTO: 0.7 10E3/UL (ref 0.8–5.3)
LYMPHOCYTES NFR BLD AUTO: 12 %
MAGNESIUM SERPL-MCNC: 1.9 MG/DL (ref 1.7–2.3)
MCH RBC QN AUTO: 33.1 PG (ref 26.5–33)
MCHC RBC AUTO-ENTMCNC: 34.5 G/DL (ref 31.5–36.5)
MCV RBC AUTO: 96 FL (ref 78–100)
MONOCYTES # BLD AUTO: 0.4 10E3/UL (ref 0–1.3)
MONOCYTES NFR BLD AUTO: 8 %
NEUTROPHILS # BLD AUTO: 4.1 10E3/UL (ref 1.6–8.3)
NEUTROPHILS NFR BLD AUTO: 76 %
NRBC # BLD AUTO: 0 10E3/UL
NRBC BLD AUTO-RTO: 0 /100
PHOSPHATE SERPL-MCNC: 2.7 MG/DL (ref 2.5–4.5)
PLATELET # BLD AUTO: 148 10E3/UL (ref 150–450)
POTASSIUM SERPL-SCNC: 4.9 MMOL/L (ref 3.4–5.3)
RBC # BLD AUTO: 4.11 10E6/UL (ref 4.4–5.9)
SODIUM SERPL-SCNC: 138 MMOL/L (ref 136–145)
WBC # BLD AUTO: 5.4 10E3/UL (ref 4–11)

## 2023-05-19 PROCEDURE — 80069 RENAL FUNCTION PANEL: CPT | Performed by: INTERNAL MEDICINE

## 2023-05-19 PROCEDURE — 36415 COLL VENOUS BLD VENIPUNCTURE: CPT | Performed by: INTERNAL MEDICINE

## 2023-05-19 PROCEDURE — 85025 COMPLETE CBC W/AUTO DIFF WBC: CPT | Performed by: INTERNAL MEDICINE

## 2023-05-19 PROCEDURE — 99233 SBSQ HOSP IP/OBS HIGH 50: CPT | Performed by: HOSPITALIST

## 2023-05-19 PROCEDURE — 120N000001 HC R&B MED SURG/OB

## 2023-05-19 PROCEDURE — 250N000012 HC RX MED GY IP 250 OP 636 PS 637: Performed by: INTERNAL MEDICINE

## 2023-05-19 PROCEDURE — 250N000013 HC RX MED GY IP 250 OP 250 PS 637: Performed by: INTERNAL MEDICINE

## 2023-05-19 PROCEDURE — G0463 HOSPITAL OUTPT CLINIC VISIT: HCPCS

## 2023-05-19 PROCEDURE — 83735 ASSAY OF MAGNESIUM: CPT | Performed by: INTERNAL MEDICINE

## 2023-05-19 PROCEDURE — 250N000011 HC RX IP 250 OP 636: Performed by: INTERNAL MEDICINE

## 2023-05-19 RX ORDER — OXYCODONE HYDROCHLORIDE 5 MG/1
10 TABLET ORAL EVERY 4 HOURS PRN
Status: DISCONTINUED | OUTPATIENT
Start: 2023-05-19 | End: 2023-05-21 | Stop reason: HOSPADM

## 2023-05-19 RX ORDER — OXYCODONE HYDROCHLORIDE 5 MG/1
5 TABLET ORAL EVERY 4 HOURS PRN
Status: DISCONTINUED | OUTPATIENT
Start: 2023-05-19 | End: 2023-05-21 | Stop reason: HOSPADM

## 2023-05-19 RX ADMIN — OXYCODONE HYDROCHLORIDE 10 MG: 5 TABLET ORAL at 20:03

## 2023-05-19 RX ADMIN — CEFAZOLIN SODIUM 2 G: 2 INJECTION, SOLUTION INTRAVENOUS at 14:47

## 2023-05-19 RX ADMIN — DULOXETINE HYDROCHLORIDE 30 MG: 30 CAPSULE, DELAYED RELEASE ORAL at 08:14

## 2023-05-19 RX ADMIN — OXYCODONE HYDROCHLORIDE 10 MG: 5 TABLET ORAL at 14:55

## 2023-05-19 RX ADMIN — METFORMIN ER 500 MG 1000 MG: 500 TABLET ORAL at 18:07

## 2023-05-19 RX ADMIN — TESTOSTERONE 50 MG OF TESTOSTERONE: 50 GEL TOPICAL at 08:14

## 2023-05-19 RX ADMIN — PREGABALIN 100 MG: 100 CAPSULE ORAL at 16:38

## 2023-05-19 RX ADMIN — ASPIRIN 81 MG: 81 TABLET, COATED ORAL at 08:13

## 2023-05-19 RX ADMIN — BUPROPION HYDROCHLORIDE 150 MG: 150 TABLET, FILM COATED, EXTENDED RELEASE ORAL at 08:14

## 2023-05-19 RX ADMIN — ENOXAPARIN SODIUM 40 MG: 40 INJECTION SUBCUTANEOUS at 20:02

## 2023-05-19 RX ADMIN — CEFAZOLIN SODIUM 2 G: 2 INJECTION, SOLUTION INTRAVENOUS at 23:00

## 2023-05-19 RX ADMIN — MODAFINIL 300 MG: 200 TABLET ORAL at 08:14

## 2023-05-19 RX ADMIN — SENNOSIDES AND DOCUSATE SODIUM 1 TABLET: 50; 8.6 TABLET ORAL at 08:13

## 2023-05-19 RX ADMIN — PREGABALIN 100 MG: 100 CAPSULE ORAL at 22:07

## 2023-05-19 RX ADMIN — ACETAMINOPHEN 650 MG: 325 TABLET ORAL at 06:38

## 2023-05-19 RX ADMIN — METOPROLOL SUCCINATE 50 MG: 50 TABLET, EXTENDED RELEASE ORAL at 08:14

## 2023-05-19 RX ADMIN — HYDROCHLOROTHIAZIDE 37.5 MG: 12.5 CAPSULE ORAL at 08:13

## 2023-05-19 RX ADMIN — DULOXETINE HYDROCHLORIDE 30 MG: 30 CAPSULE, DELAYED RELEASE ORAL at 20:03

## 2023-05-19 RX ADMIN — CARIPRAZINE 4.5 MG: 4.5 CAPSULE, GELATIN COATED ORAL at 08:14

## 2023-05-19 RX ADMIN — INSULIN ASPART 4 UNITS: 100 INJECTION, SOLUTION INTRAVENOUS; SUBCUTANEOUS at 12:30

## 2023-05-19 RX ADMIN — LOSARTAN POTASSIUM 100 MG: 100 TABLET, FILM COATED ORAL at 08:13

## 2023-05-19 RX ADMIN — TAMSULOSIN HYDROCHLORIDE 0.4 MG: 0.4 CAPSULE ORAL at 20:02

## 2023-05-19 RX ADMIN — PREGABALIN 100 MG: 100 CAPSULE ORAL at 08:14

## 2023-05-19 RX ADMIN — METFORMIN ER 500 MG 1000 MG: 500 TABLET ORAL at 08:13

## 2023-05-19 RX ADMIN — INSULIN GLARGINE 12 UNITS: 100 INJECTION, SOLUTION SUBCUTANEOUS at 22:07

## 2023-05-19 RX ADMIN — ACETAMINOPHEN 650 MG: 325 TABLET ORAL at 20:03

## 2023-05-19 RX ADMIN — MULTIPLE VITAMINS W/ MINERALS TAB 1 TABLET: TAB at 08:13

## 2023-05-19 RX ADMIN — Medication 600 MG: at 08:13

## 2023-05-19 RX ADMIN — INSULIN ASPART 3 UNITS: 100 INJECTION, SOLUTION INTRAVENOUS; SUBCUTANEOUS at 18:00

## 2023-05-19 RX ADMIN — ALPRAZOLAM 0.25 MG: 0.25 TABLET ORAL at 22:07

## 2023-05-19 RX ADMIN — OXYCODONE HYDROCHLORIDE 5 MG: 5 TABLET ORAL at 06:38

## 2023-05-19 RX ADMIN — INSULIN ASPART 5 UNITS: 100 INJECTION, SOLUTION INTRAVENOUS; SUBCUTANEOUS at 08:46

## 2023-05-19 RX ADMIN — CEFAZOLIN SODIUM 2 G: 2 INJECTION, SOLUTION INTRAVENOUS at 06:06

## 2023-05-19 RX ADMIN — ATORVASTATIN CALCIUM 30 MG: 20 TABLET, FILM COATED ORAL at 08:14

## 2023-05-19 ASSESSMENT — ACTIVITIES OF DAILY LIVING (ADL)
ADLS_ACUITY_SCORE: 20
ADLS_ACUITY_SCORE: 18
ADLS_ACUITY_SCORE: 20
DEPENDENT_IADLS:: INDEPENDENT
ADLS_ACUITY_SCORE: 20
ADLS_ACUITY_SCORE: 20
ADLS_ACUITY_SCORE: 18
ADLS_ACUITY_SCORE: 20

## 2023-05-19 NOTE — PROGRESS NOTES
Alert and oriented x 4. VSS, room air. Dressing changed to L shin wound, no drainage noted. Britton LE redness noted, > on LLE. Generalized pain managed with Oxycodone. Intrathecal pain pump on R hip. SBA assist to BR with cane, voiding adequately. Reapproached x 3 for CPAP, refused, and always saying to use it later. O2 sats 94-96%, no SOB noted. K and Mag Protocols, WNL, labs today.

## 2023-05-19 NOTE — CONSULTS
"[facility]  WO Nurse Inpatient Assessment     Consulted for: Left leg wound     Summary: Recently discharged patient who returns to [facility] with chronic venous ulcer to left leg.    Patient History (according to provider note(s):      \"This is a 67-year-old male with history of lymphedema, anxiety, bipolar disorder, depression, hypertension, hyperlipidemia, obesity, cirrhosis secondary to MEJIAS, restless leg syndrome, polyneuropathy, diabetes mellitus type 2, migraine, sleep apnea, not compliant with CPAP, peripheral vascular disease, followed by [facility] and has intrathecal pump, which has fentanyl, bupivacaine, and morphine, just refilled today, was recently admitted in the hospital from 05/11-05/15 for bilateral lower extremity cellulitis and was discharged on 05/15, sent home on 2 weeks of oral antibiotics, but went home and did not take his medications and now comes here with weakness, fatigue, tiredness, and worsening cellulitis of the left leg.\"    Assessment:      Areas visualized during today's visit: Lower extremities     Wound location: Left lateral calf        Last photo: 5/19/23  Wound due to: Venous Ulcer  Wound history/plan of care: [facility] nurse saw patient for this wound recently. Per patient, this is a chronic wound that was improving until he re-injured it on the edge of a table. Covered with Adaptic and gauze on assessment.  Wound base:  granulation tissue and non-granular tissue, yellow slimy devitalized tissue, dry serous drainage     Palpation of the wound bed: normal      Drainage: small     Description of drainage: serosanguinous     Measurements (length x width x depth, in cm): 1.6  x 1.8  x  0.1 cm      Tunneling: N/A     Undermining: N/A  Periwound skin: Edematous      Color: red and shiny      Temperature: normal   Odor: none  Pain: severe, sharp and burning  Pain interventions prior to dressing change: patient tolerated well, soaking and slow and gentle cares "   Treatment goal: Heal , Drainage control, Infection control/prevention, Increase granulation and Protection  STATUS: initial assessment  Supplies ordered: gathered, supplies stored on unit, discussed with RN and discussed with patient      Wound location: Right great lateral toe        Last photo: 5/19/23  Wound due to: Callus  Wound history/plan of care: Per patient, he saw Dr. Stanford who shaved the callus and it has resulted in this wound. Open to air on assessment.  Wound base: UTV wound bed.     Palpation of the wound bed: firm      Drainage: none     Description of drainage: none     Measurements (length x width x depth, in cm): 3.2  x 1.7  x  Unknown (depth of fissure) cm      Tunneling: N/A     Undermining: N/A  Periwound skin: Hyperkeratosis and dry, dark red drainage      Color: purple and red      Temperature: normal   Odor: none  Pain: denies , none  Pain interventions prior to dressing change: patient tolerated well  Treatment goal: Infection control/prevention and Protection  STATUS: initial assessment  Supplies ordered: supplies stored on unit and discussed with patient     Treatment Plan:     Left leg wound(s): Every other day and PRN for drainage  1. Saturate Kerlix roll with Vashe and use it to wrap the left leg. Allow to soak for about 15 minutes.  2. Remove the gauze and let the skin dry.  3. Wipe cyndy-wound skin with Cavilon no-sting barrier and allow to dry.  4. Cut a piece of Aquacel Ag slightly larger that the wound and place in wound bed.  5. Cover with an Optifoam border.  6. Apply EdemaWear to left leg.    Right lateral great toe wound: Daily   1. Cleanse area with wound cleanser.  2. Use Manuel spray to crusty, dry skin. Rub in Manuel and let it absorb for a few minutes. OK to use Manuel on dry skin on legs, too.  3. Wipe Manuel away. This should help soften the hardened skin. Can leave open to air or cover with gauze per patient's preference.     Orders: Written    RECOMMEND PRIMARY TEAM ORDER:  "None, at this time  Education provided: plan of care and wound progress   Discussed plan of care with: Patient and Nurse  WOC nurse follow-up plan: weekly  Notify WOC if wound(s) deteriorate.  Nursing to notify the Provider(s) and re-consult the WOC Nurse if new skin concern.    DATA:     Current support surface: Standard  Standard gel/foam mattress (IsoFlex, Atmos air, etc)  Containment of urine/stool: Continent of bladder and Continent of bowel  BMI: Body mass index is 36.76 kg/m .   Active diet order: Orders Placed This Encounter      Combination Diet Regular Diet Adult; Moderate Consistent Carb (60 g CHO per Meal) Diet     Output: I/O last 3 completed shifts:  In: 100 [IV Piggyback:100]  Out: -      Labs: Recent Labs   Lab 05/18/23  1436   ALBUMIN 4.0   HGB 14.1   WBC 4.2     Pressure injury risk assessment:   Sensory Perception: 3-->slightly limited  Moisture: 4-->rarely moist  Activity: 3-->walks occasionally  Mobility: 3-->slightly limited  Nutrition: 3-->adequate  Friction and Shear: 3-->no apparent problem  Eloy Score: 19    Chantel Trejo RN, CWON  Please contact via [employer] (M-F [id number]) at name or group \"WO nurse\"  Dept. Office Number: [phone]  "

## 2023-05-19 NOTE — UTILIZATION REVIEW
"  Admission Status; Secondary Review Determination         Under the authority of the Utilization Management Committee, the utilization review process indicated a secondary review on the above patient.  The review outcome is based on review of the medical records, discussions with staff, and applying clinical experience noted on the date of the review.        (X)      Inpatient Status Appropriate - This patient's medical care is consistent with medical management for inpatient care and reasonable inpatient medical practice.      () Observation Status Appropriate - This patient does not meet hospital inpatient criteria and is placed in observation status. If this patient's primary payer is Medicare and was admitted as an inpatient, Condition Code 44 should be used and patient status changed to \"observation\".   () Admission Status NOT Appropriate - This patient's medical care is not consistent with medical management for Inpatient or Observation Status.          RATIONALE FOR DETERMINATION      67-year-old male with history of lymphedema, anxiety, bipolar disorder, depression, hypertension, hyperlipidemia, obesity, cirrhosis secondary to MEJIAS, restless leg syndrome, polyneuropathy, diabetes mellitus type 2, migraine, sleep apnea, not compliant with CPAP, peripheral vascular disease, followed by [facility] and has intrathecal pump, which has fentanyl, bupivacaine, and morphine, just refilled today, was recently admitted in the hospital from 05/11-05/15 for bilateral lower extremity cellulitis and was discharged on 05/15.  Patient returned with weakness, fatigue, and worsening cellulitis of his left leg.  Cultures were obtained.  Patient was placed on IV antibiotics.  A multiple day hospitalization is anticipated, and patient is appropriate for inpatient status.    The severity of illness, intensity of service provided, expected LOS and risk for adverse outcome make the care complex, high risk and appropriate for " hospital admission.        The information on this document is developed by the utilization review team in order for the business office to ensure compliance.  This only denotes the appropriateness of proper admission status and does not reflect the quality of care rendered.         The definitions of Inpatient Status and Observation Status used in making the determination above are those provided in the CMS Coverage Manual, Chapter 1 and Chapter 6, section 70.4.      Sincerely,     Sherwin Saldana MD  Physician Advisor  Utilization Review/ Case Management  [facility].

## 2023-05-19 NOTE — PROGRESS NOTES
[facility]    Medicine Progress Note - Hospitalist Service    Date of Admission:  5/18/2023    Assessment & Plan   This is a 67-year-old male with history of lymphedema, anxiety, bipolar disorder, depression, hypertension, hyperlipidemia, obesity, cirrhosis secondary to MEJIAS, restless leg syndrome, polyneuropathy, diabetes mellitus type 2, migraine, sleep apnea, not compliant with CPAP, peripheral vascular disease, followed by [facility] and has intrathecal pump, which has fentanyl, bupivacaine, and morphine, just refilled today, was recently admitted in the hospital from 05/11-05/15 for bilateral lower extremity cellulitis and was discharged on 05/15, sent home on 2 weeks of oral antibiotics, but went home and did not take his medications and now comes here with weakness, fatigue, tiredness, and worsening cellulitis of the left leg.     Left lower extremity cellulitis    *bilateral lower extremity cellulitis, was treated with intravenous cefazolin and did improve significantly as reviewed on the pictures in Epic, but did not fill up his medications and his left leg is now worse.    * He has erythema, warmth, tenderness, has nonhealing ulcers on the left shin as well.  Right leg has resolving cellulitis, very mild erythema, no warmth or tenderness, so the infection mostly is in the left lower extremity.   Plan  -- resume cefazolin 2 grams every 8 hours.   -- wound nurse to see him and we will apply the lymphedema wraps and left lower extremity wound management.  -- Encourage elevation of lower extremities.   -- Discussed compliance of medication with patient.  5/19: At this point, cellulitis looks to be improved.  What I see on exam is likely related to venous stasis.  No warmth, erythema is likely venous stasis bilaterally.  And no pain as well.  We will switch patient to oral antibiotics in anticipation for discharge likely tomorrow.    Generalized  weakness/fatigue/tiredness/fogginess   Likely this is multifactorial, but likely mainly due to noncompliance with his CPAP and given his significant obesity, sleep apnea and worsened by his recent infection and hospitalization and uncontrolled diabetes are the cause of his symptoms.    Plan  -- Recommend use of CPAP machine.  we will use CPAP while he is here.    He would likely need a prescription for a new CPAP machine on discharge as he is no longer able to find his.  -- PT/OT to [facility]  --  will be consulted for financial issues, as well as safe disposition, as well as help in getting him the CPAP machine.    Diabetes mellitus type 2, uncontrolled:    * blood sugar is 265 on admission  * Apparently he is not using his insulin or metformin at home.    Plan  -- resume insulin.  He is on 10 units of Lantus at night.    He was placed on 12 units Lantus at night here, appears to be controlling his sugars appropriately.  We will continue.  -- resume his metformin 1000 b.i.d.    -- continue NovoLog 1 unit per 10 grams of carbohydrates and keep him on sliding scale insulin for correction and hypoglycemia protocol    Bipolar disorder:    He has been stable.  He is on cariprazine and Cymbalta.  We will continue with that.    Polyneuropathy/restless leg syndrome/chronic pain:    The patient is followed by [facility].  He has an intrathecal pump, which was refilled today, which includes fentanyl, morphine, and bupivacaine.  His pain is much better controlled.  In the meantime, we will continue with Cymbalta.  Continue with pregabalin and oxycodone as needed.    Hypertension  On losartan 100 mg daily and hydrochlorothiazide.  I will continue with that.  He is on metoprolol 50 daily as well.    Hyperlipidemia:  On Lipitor.  We will continue with that.      Morbid obesity and sleep apnea:   Need to lose weight, diet, exercise, and use CPAP machine regularly.    History of amyloidosis:    Followed outpatient with  "his primary care physician.     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    Thrombocytopenia:   Slightly worse than his last admission.  We will keep an eye on it.  It may be because of the infection.  We will need to keep an eye on it and  follow.  If it drops further, then we will have hematology review him.     Diet: Combination Diet Regular Diet Adult; Moderate Consistent Carb (60 g CHO per Meal) Diet    DVT Prophylaxis: Pneumatic Compression Devices  Parker Catheter: Not present  Lines: None     Cardiac Monitoring: None  Code Status: Full Code      Clinically Significant Risk Factors Present on Admission                # Drug Induced Platelet Defect: home medication list includes an antiplatelet medication   # Hypertension: Noted on problem list     # DMII: A1C = 8.8 % (Ref range: <5.7 %) within past 6 months    # Obesity: Estimated body mass index is 36.76 kg/m  as calculated from the following:    Height as of this encounter: 1.676 m (5' 6\").    Weight as of this encounter: 103.3 kg (227 lb 11.8 oz).            Disposition Plan  Likely in a.m. if patient feels well overall.     Expected Discharge Date: 05/20/2023                  Ritu Tomlin MD  Hospitalist Service  [facility]  Securely message with [employer] (more info)  Text page via [employer] Paging/Directory   ______________________________________________________________________    Interval History   Patient sitting in bed.  Notes his presenting symptoms including shortness of breath, generalized fatigue, and lower extremity likely cellulitis is all improved.  He notes he had a good sleep overnight.  He would like to stay another 24 hours to ensure that he continues to feel good tomorrow.  No other complaints.    Physical Exam   Vital Signs: Temp: 97.6  F (36.4  C) Temp src: Oral BP: (!) 142/93 Pulse: 65   Resp: 16 SpO2: 92 % O2 Device: None (Room air)    Weight: 227 lbs 11.76 " oz    General Appearance: Well appearing for stated age.  Respiratory: CTAB, no rales or ronchi  Cardiovascular: S1, S2 normal, no murmurs  GI: non-tender on palpation, BS present      Medical Decision Making       55 MINUTES SPENT BY ME on the date of service doing chart review, history, exam, documentation & further activities per the note.      Data     I have personally reviewed the following data over the past 24 hrs:    5.4  \   13.6   / 148 (L)     138 102 16.3 /  197 (H)   4.9 26 0.79 \       ALT: N/A AST: N/A AP: N/A TBILI: N/A   ALB: 3.9 TOT PROTEIN: N/A LIPASE: N/A       Trop: N/A BNP: N/A       Imaging results reviewed over the past 24 hrs:   No results found for this or any previous visit (from the past 24 hour(s)).

## 2023-05-19 NOTE — PROGRESS NOTES
RECEIVING UNIT ED HANDOFF REVIEW    ED Nurse Handoff Report was reviewed by: Rose Marie Vera RN on May 18, 2023 at 7:30 PM

## 2023-05-19 NOTE — CONSULTS
Care Management Initial Consult    General Information  Assessment completed with: Patient,    Type of CM/SW Visit: Offer D/C Planning    Primary Care Provider verified and updated as needed: Yes   Readmission within the last 30 days: previous discharge plan unsuccessful      Reason for Consult: discharge planning  Advance Care Planning:            Communication Assessment  Patient's communication style: spoken language (English or Bilingual)    Hearing Difficulty or Deaf: no   Wear Glasses or Blind: no    Cognitive  Cognitive/Neuro/Behavioral: WDL                      Living Environment:   People in home: alone     Current living Arrangements: house      Able to return to prior arrangements: other (see comments) (possible)       Family/Social Support:  Care provided by: self  Provides care for: no one, unable/limited ability to care for self  Marital Status: Single             Description of Support System:           Current Resources:   Patient receiving home care services: No     Community Resources: None, Other (see comment) (has some wound supplies)  Equipment currently used at home: cane, straight (has FWW at home also that he doesn't use currently)  Supplies currently used at home:      Employment/Financial:  Employment Status: employed full-time, self-employed        Financial Concerns: No concerns identified           Does the patient's insurance plan have a 3 day qualifying hospital stay waiver?  No    Lifestyle & Psychosocial Needs:  Social Determinants of Health     Tobacco Use: Low Risk  (5/18/2023)    Patient History      Smoking Tobacco Use: Never      Smokeless Tobacco Use: Never      Passive Exposure: Not on file   Alcohol Use: Not on file   Financial Resource Strain: Low Risk  (5/13/2020)    Overall Financial Resource Strain (CARDIA)      Difficulty of Paying Living Expenses: Not very hard   Food Insecurity: No Food Insecurity (5/13/2020)    Hunger Vital Sign      Worried About Running Out of Food  in the Last Year: Never true      Ran Out of Food in the Last Year: Never true   Transportation Needs: No Transportation Needs (5/13/2020)    PRAPARE - Transportation      Lack of Transportation (Medical): No      Lack of Transportation (Non-Medical): No   Physical Activity: Unknown (5/13/2020)    Exercise Vital Sign      Days of Exercise per Week: 1 day      Minutes of Exercise per Session: Not on file   Stress: Not on file   Social Connections: Not on file   Intimate Partner Violence: Not At Risk (3/17/2023)    Humiliation, Afraid, Rape, and Kick questionnaire      Fear of Current or Ex-Partner: No      Emotionally Abused: No      Physically Abused: No      Sexually Abused: No   Depression: Not at risk (4/17/2023)    PHQ-2      PHQ-2 Score: 0   Housing Stability: Not on file       Functional Status:  Prior to admission patient needed assistance:   Dependent ADLs:: Ambulation-cane  Dependent IADLs:: Independent  Assesssment of Functional Status: Not at baseline with ADL Functioning, Not at  functional baseline    Mental Health Status:          Chemical Dependency Status:                Values/Beliefs:  Spiritual, Cultural Beliefs, Scientology Practices, Values that affect care:                 Additional Information:  Writer met with pt to discuss discharge planning and his readmission to hospital after being discharged on 5-15-23. This is his third readmission to the hospital in a month according to pt. Pt stated he was independent for the most part but he was feeling SOB and increased weakness. He states he uses a cane at times and has a FWW at home but doesn't use it much. 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 He states he was able to manage his own cares, transportation, meds, etc himself. He just needs help  with his wound cares. CM/SONNY will continue to follow for any discharge needs as they arise.        Rad Katz RN  [facility]  Inpatient Care Management - FLOAT  CM RN Mobile: [phone] daily 7:30-4:00

## 2023-05-19 NOTE — PROGRESS NOTES
Pt is A&OX4, VSS on RA. Pt is a SBA w/cane. Pain managed with PRN Oxycodone. Pt has intrathecal pain pump on R hip. PIV is SL. Pt is voiding in the bathroom.  Pt is olerating  Oral intake. Labs for K and  Mag protocols are WNL.

## 2023-05-19 NOTE — PLAN OF CARE
Trauma/Ortho/Medical (Choose one) Medical    Diagnosis: LLE Cellulitis  POD#: NA  Mental Status:A&O x4  Activity/dangle UP with SBA with cane  Diet: Regular  Pain: oxycodone  Parker/Voiding: Voiding in bathroom  Tele/Restraints/Iso: NA  02/LDA: AMARI/ LUIS M  D/C Date:TBD  Other Info: Dressing changed today by C nurse. On intermittent IV abx.

## 2023-05-20 ENCOUNTER — APPOINTMENT (OUTPATIENT)
Dept: PHYSICAL THERAPY | Facility: CLINIC | Age: 68
DRG: 603 | End: 2023-05-20
Attending: INTERNAL MEDICINE
Payer: COMMERCIAL

## 2023-05-20 LAB
GLUCOSE BLDC GLUCOMTR-MCNC: 166 MG/DL (ref 70–99)
GLUCOSE BLDC GLUCOMTR-MCNC: 166 MG/DL (ref 70–99)
GLUCOSE BLDC GLUCOMTR-MCNC: 216 MG/DL (ref 70–99)
GLUCOSE BLDC GLUCOMTR-MCNC: 232 MG/DL (ref 70–99)
GLUCOSE BLDC GLUCOMTR-MCNC: 243 MG/DL (ref 70–99)
GLUCOSE BLDC GLUCOMTR-MCNC: 286 MG/DL (ref 70–99)
MAGNESIUM SERPL-MCNC: 1.9 MG/DL (ref 1.7–2.3)
POTASSIUM SERPL-SCNC: 4.2 MMOL/L (ref 3.4–5.3)

## 2023-05-20 PROCEDURE — 83735 ASSAY OF MAGNESIUM: CPT | Performed by: HOSPITALIST

## 2023-05-20 PROCEDURE — 250N000013 HC RX MED GY IP 250 OP 250 PS 637: Performed by: INTERNAL MEDICINE

## 2023-05-20 PROCEDURE — 97530 THERAPEUTIC ACTIVITIES: CPT | Mod: GP

## 2023-05-20 PROCEDURE — 99232 SBSQ HOSP IP/OBS MODERATE 35: CPT | Performed by: HOSPITALIST

## 2023-05-20 PROCEDURE — 97116 GAIT TRAINING THERAPY: CPT | Mod: GP

## 2023-05-20 PROCEDURE — 97161 PT EVAL LOW COMPLEX 20 MIN: CPT | Mod: GP

## 2023-05-20 PROCEDURE — 120N000001 HC R&B MED SURG/OB

## 2023-05-20 PROCEDURE — 36415 COLL VENOUS BLD VENIPUNCTURE: CPT | Performed by: HOSPITALIST

## 2023-05-20 PROCEDURE — 250N000011 HC RX IP 250 OP 636: Performed by: INTERNAL MEDICINE

## 2023-05-20 PROCEDURE — 84132 ASSAY OF SERUM POTASSIUM: CPT | Performed by: HOSPITALIST

## 2023-05-20 RX ADMIN — INSULIN ASPART 6 UNITS: 100 INJECTION, SOLUTION INTRAVENOUS; SUBCUTANEOUS at 13:22

## 2023-05-20 RX ADMIN — METFORMIN ER 500 MG 1000 MG: 500 TABLET ORAL at 18:05

## 2023-05-20 RX ADMIN — ATORVASTATIN CALCIUM 30 MG: 20 TABLET, FILM COATED ORAL at 08:25

## 2023-05-20 RX ADMIN — BUPROPION HYDROCHLORIDE 150 MG: 150 TABLET, FILM COATED, EXTENDED RELEASE ORAL at 08:26

## 2023-05-20 RX ADMIN — METFORMIN ER 500 MG 1000 MG: 500 TABLET ORAL at 08:25

## 2023-05-20 RX ADMIN — MULTIPLE VITAMINS W/ MINERALS TAB 1 TABLET: TAB at 08:24

## 2023-05-20 RX ADMIN — ENOXAPARIN SODIUM 40 MG: 40 INJECTION SUBCUTANEOUS at 20:06

## 2023-05-20 RX ADMIN — Medication 600 MG: at 08:25

## 2023-05-20 RX ADMIN — CEFAZOLIN SODIUM 2 G: 2 INJECTION, SOLUTION INTRAVENOUS at 06:50

## 2023-05-20 RX ADMIN — POLYETHYLENE GLYCOL 3350 17 G: 17 POWDER, FOR SOLUTION ORAL at 08:24

## 2023-05-20 RX ADMIN — SENNOSIDES AND DOCUSATE SODIUM 2 TABLET: 50; 8.6 TABLET ORAL at 08:25

## 2023-05-20 RX ADMIN — ASPIRIN 81 MG: 81 TABLET, COATED ORAL at 08:25

## 2023-05-20 RX ADMIN — DULOXETINE HYDROCHLORIDE 30 MG: 30 CAPSULE, DELAYED RELEASE ORAL at 20:06

## 2023-05-20 RX ADMIN — PREGABALIN 100 MG: 100 CAPSULE ORAL at 08:26

## 2023-05-20 RX ADMIN — LOSARTAN POTASSIUM 100 MG: 100 TABLET, FILM COATED ORAL at 08:26

## 2023-05-20 RX ADMIN — ACETAMINOPHEN 650 MG: 325 TABLET ORAL at 08:26

## 2023-05-20 RX ADMIN — OXYCODONE HYDROCHLORIDE 10 MG: 5 TABLET ORAL at 22:20

## 2023-05-20 RX ADMIN — CEFAZOLIN SODIUM 2 G: 2 INJECTION, SOLUTION INTRAVENOUS at 15:15

## 2023-05-20 RX ADMIN — INSULIN ASPART 6 UNITS: 100 INJECTION, SOLUTION INTRAVENOUS; SUBCUTANEOUS at 18:05

## 2023-05-20 RX ADMIN — PREGABALIN 100 MG: 100 CAPSULE ORAL at 22:20

## 2023-05-20 RX ADMIN — MODAFINIL 300 MG: 200 TABLET ORAL at 08:25

## 2023-05-20 RX ADMIN — OXYCODONE HYDROCHLORIDE 10 MG: 5 TABLET ORAL at 08:26

## 2023-05-20 RX ADMIN — ACETAMINOPHEN 650 MG: 325 TABLET ORAL at 22:20

## 2023-05-20 RX ADMIN — ALPRAZOLAM 0.25 MG: 0.25 TABLET ORAL at 22:20

## 2023-05-20 RX ADMIN — CARIPRAZINE 4.5 MG: 4.5 CAPSULE, GELATIN COATED ORAL at 08:26

## 2023-05-20 RX ADMIN — TAMSULOSIN HYDROCHLORIDE 0.4 MG: 0.4 CAPSULE ORAL at 20:06

## 2023-05-20 RX ADMIN — INSULIN ASPART 6 UNITS: 100 INJECTION, SOLUTION INTRAVENOUS; SUBCUTANEOUS at 08:28

## 2023-05-20 RX ADMIN — DULOXETINE HYDROCHLORIDE 30 MG: 30 CAPSULE, DELAYED RELEASE ORAL at 08:25

## 2023-05-20 RX ADMIN — HYDROCHLOROTHIAZIDE 37.5 MG: 12.5 CAPSULE ORAL at 08:24

## 2023-05-20 RX ADMIN — METOPROLOL SUCCINATE 50 MG: 50 TABLET, EXTENDED RELEASE ORAL at 08:25

## 2023-05-20 RX ADMIN — TESTOSTERONE 50 MG OF TESTOSTERONE: 50 GEL TOPICAL at 08:26

## 2023-05-20 RX ADMIN — CEFAZOLIN SODIUM 2 G: 2 INJECTION, SOLUTION INTRAVENOUS at 22:20

## 2023-05-20 RX ADMIN — PREGABALIN 100 MG: 100 CAPSULE ORAL at 15:15

## 2023-05-20 ASSESSMENT — ACTIVITIES OF DAILY LIVING (ADL)
ADLS_ACUITY_SCORE: 20

## 2023-05-20 NOTE — PROGRESS NOTES
[facility]    Medicine Progress Note - Hospitalist Service    Date of Admission:  5/18/2023    Assessment & Plan   This is a 67-year-old male with history of lymphedema, anxiety, bipolar disorder, depression, hypertension, hyperlipidemia, obesity, cirrhosis secondary to MEJIAS, restless leg syndrome, polyneuropathy, diabetes mellitus type 2, migraine, sleep apnea, not compliant with CPAP, peripheral vascular disease, followed by [facility] and has intrathecal pump, which has fentanyl, bupivacaine, and morphine, just refilled today, was recently admitted in the hospital from 05/11-05/15 for bilateral lower extremity cellulitis and was discharged on 05/15, sent home on 2 weeks of oral antibiotics, but went home and did not take his medications and now comes here with weakness, fatigue, tiredness, and worsening cellulitis of the left leg.     Left lower extremity cellulitis    Likely Chronic venous stasis  Likely Chronic erythema due to above.  Nonhealing ulcers on the left shin as well  Bilateral lower extremity cellulitis, was treated with intravenous cefazolin and did improve significantly as reviewed on the pictures in Epic, but did not fill up his medications and his left leg is now worse.  .   At this point, cellulitis looks to be improved. No warmth/ minimal TTP.  Bilateral erythema is likelydue to chronic venous stasis..  Currently on cefazolin.   -- Cont cefazolin 2 grams while inpatient and transition to PO  -- wound nurse for wound care, lymphedema wraps.  -- Encourage elevation of lower extremities.       Generalized weakness/fatigue/tiredness/fogginess   Resolved.  - PY/OT.    Hx NORMA: does has not been using his CPAP as he [place]. States it is too early to request a new per his insurance. States cant afford the cost at this time.  - CPAP here.  - CC/SW consult.     Diabetes mellitus type 2, uncontrolled:     He is on 10 units of Lantus at night and  metformin PTA. He was placed on 12 units Lantus at night here. Also on ssi and carb coverage. A1c on 5/12/23 was 8.8.  Apparently he is not using his insulin or metformin at home.   Blood sugar elevated.  224 this am.  - Increase lantus to 15 units hs.  - Cont sliding scale insulin/carb coverage.  -- Cont his metformin 1000 b.i.d.    -- Hypoglycemia protocol.  - Adjust regimen as indicated.  - Home regimen at discharge.    Bipolar disorder:    He has been stable.  He is on cariprazine and Cymbalta.  We will continue with that.    Polyneuropathy/restless leg syndrome/chronic pain:    The patient is followed by [facility].  He has an intrathecal pump, which was refilled today, which includes fentanyl, morphine, and bupivacaine.  His pain is much better controlled.  In the meantime, we will continue with Cymbalta.  Continue with pregabalin and oxycodone as needed.    Hypertension  Cont PTA meds.    Hyperlipidemia:  On Lipitor.  We will continue with that.      Morbid obesity and sleep apnea:   Need to lose weight, diet, exercise, and use CPAP machine regularly.    History of amyloidosis:    Followed outpatient with his primary care physician.     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    Thrombocytopenia:   Slightly worse than his last admission, now trending up (148 this am).. It may be because of the infection.  We will need to keep an eye on it and  follow.  If it drops further, then we will have hematology review him.     Diet: Combination Diet Regular Diet Adult; Moderate Consistent Carb (60 g CHO per Meal) Diet    DVT Prophylaxis:  Lovenox.  Parker Catheter: Not present  Lines: None     Cardiac Monitoring: None  Code Status: Full Code      Clinically Significant Risk Factors                # Thrombocytopenia: Lowest platelets = 127 in last 2 days, will monitor for bleeding   # Hypertension: Noted on problem list       # DMII: A1C = 8.8 % (Ref range: <5.7  "%) within past 6 months, PRESENT ON ADMISSION  # Obesity: Estimated body mass index is 37.58 kg/m  as calculated from the following:    Height as of this encounter: 1.676 m (5' 6\").    Weight as of this encounter: 105.6 kg (232 lb 12.9 oz)., PRESENT ON ADMISSION          Disposition Plan  Likely tomorrow pending therapies and  if no new issues.     Expected Discharge Date: 05/20/2023      Destination: home with help/services;other (comment) (TCU)            Nohemi Merlos MD  Hospitalist Service  [facility]  __________________________________    Interval History   Patient sitting in bed. Improved cellulitis. No extremities pain but mild tenderness with palpation. Oriented x3.   Wants to be discharged tomorrow afternoon.    Physical Exam   Vital Signs: Temp: 96.8  F (36  C) Temp src: Axillary BP: 129/78 Pulse: 79   Resp: 16 SpO2: 96 % O2 Device: None (Room air)    Weight: 232 lbs 12.89 oz    General Appearance: Well appearing for stated age.  Respiratory: CTAB, no rales or ronchi  Cardiovascular: S1, S2 normal, no murmurs  GI: non-tender on palpation, BS present      Data     I have personally reviewed the following data over the past 24 hrs:    5.4  \   13.6   / 148 (L)     138 102 16.3 /  232 (H)   4.2 26 0.79 \       ALT: N/A AST: N/A AP: N/A TBILI: N/A   ALB: 3.9 TOT PROTEIN: N/A LIPASE: N/A       Imaging results reviewed over the past 24 hrs:   No results found for this or any previous visit (from the past 24 hour(s)).  "

## 2023-05-20 NOTE — PLAN OF CARE
Goal Outcome Evaluation:     5/19/23 [id number]     Diagnosis: LLE Cellulitis  POD#: NA  Mental Status:A&O x4  Activity/dangle UP with SBA with cane, generalized weakness  Diet: Regular  Pain: oxycodone/Tylenol/intrathecal pain pump; refilled 5/19  Parker/Voiding: Voiding in bathroom  Tele/Restraints/Iso: NA  02/LDA: RA/ LUIS M  D/C Date:TBD  Other Info: Dressing changed today by [facility] nurse. On intermittent IV abx. Xanax at HS

## 2023-05-20 NOTE — PLAN OF CARE
"Goal Outcome Evaluation:                  Summary:  Pt was hospitalized 5/11-5/15 and sent home on PO abx, but pt was non complaint and came back with weakness/fatigue/tiredness and worsening cellulitis. PMH includes anxiety/depression,Bipolar disorder,HLD,obesity,cirrhosis,RLS, lymphedema.    .Date/Time:5/-15:30    Trauma/Ortho/Medical (Choose one) medical    Diagnosis:LLE cellulitis  POD#:na  Mental Status:A/OX 4  Activity/dangle: Independent in the room  Diet:Mod carb  Pain:oxycodone, tylenol and pt also has intra cecal pain pump.  Parker/Voiding:urinal/BR  Tele/Restraints/Iso:na  02/LDA:PIV L   D/C Date:possible 5/21  Other Info:pt denies any dizziness or lightheadedness. Pt c/o generalized pain, prn oxycodone and tylenol given. Pt reported baseline BLE's n/t. BLE's rosaura/reddened. Dressing changed on the LLE. VSS, on RA./78 (BP Location: Right arm)   Pulse 79   Temp 96.8  F (36  C) (Axillary)   Resp 16   Ht 1.676 m (5' 6\")   Wt 105.6 kg (232 lb 12.9 oz)   SpO2 96%   BMI 37.58 kg/m    Pt on K+ and Mag protocol 4.2/1.9 this morning. Will continue to monitor  .Darien Rios RN.      "

## 2023-05-20 NOTE — PLAN OF CARE
Goal Outcome Evaluation:    [id number]    Dx: SEE cellulitis   DOA: 5/18/2023  Mental Statues: A&O X4  VS/O2: VSS on RA  Activity: SBA with cane and gait belt   Diet: mod carb diet   Bowel/bladder: voiding adequately in bathroom    Skin: Dressing to R lower leg CDI.  Pain: Intrathecal pain pump  LDAs: PIV saline locked   Discharge: Pending   Other: Blood sugar at 0200 286 mg/dL

## 2023-05-20 NOTE — PLAN OF CARE
A/OX4, VSS. Up independent in room/cane. Left lower leg dreg CDI. Diabetic with insulin/carb coverage done. Plan to discharge home tomorrow.

## 2023-05-20 NOTE — PROGRESS NOTES
05/20/23 0981   Appointment Info   Signing Clinician's Name / Credentials (PT) iRcardo Pena, PT, DPT   Living Environment   People in Home alone   Current Living Arrangements apartment   Home Accessibility no concerns  (elevator)   Transportation Anticipated car, drives self   Living Environment Comments Patient lives in two level apartment, has elevator.   Self-Care   Usual Activity Tolerance moderate   Current Activity Tolerance fair   Equipment Currently Used at Home cane, straight  (has FWW at home also that he doesn't use currently)   Fall history within last six months yes   Number of times patient has fallen within last six months 2  (patient reporting nearly falling several times in apartment during a week but able to grab onto furniture to avoid.)   Activity/Exercise/Self-Care Comment Patient reported being independent with mobilty and cares at baseline with use of cane.   General Information   Onset of Illness/Injury or Date of Surgery 05/18/23   Referring Physician Nicolás Ji MD   Patient/Family Therapy Goals Statement (PT) Patient would like continued wound care for LE's   Pertinent History of Current Problem (include personal factors and/or comorbidities that impact the POC) 67-year-old male with history of lymphedema, anxiety, bipolar disorder, depression, hypertension, hyperlipidemia, obesity, cirrhosis secondary to MEJIAS, restless leg syndrome, polyneuropathy, diabetes mellitus type 2, migraine, sleep apnea, not compliant with CPAP, peripheral vascular disease, followed by [facility] and has intrathecal pump, which has fentanyl, bupivacaine, and morphine, just refilled today, was recently admitted in the hospital from 05/11-05/15 for bilateral lower extremity cellulitis and was discharged on 05/15, sent home on 2 weeks of oral antibiotics, but went home and did not take his medications and now comes here with weakness, fatigue, tiredness, and worsening cellulitis of the left leg    Existing Precautions/Restrictions fall   Cognition   Affect/Mental Status (Cognition) WFL   Orientation Status (Cognition) oriented x 4   Follows Commands (Cognition) WFL   Pain Assessment   Patient Currently in Pain Yes, see Vital Sign flowsheet   Integumentary/Edema   Integumentary/Edema Comments BLE redness, wears edemawear at baseline. No notable pitting edema in BLE's. Wound at L shin, covered by nursing. Wound/callous at L hallux, patient reported being manageed by wound care doctor.   Strength (Manual Muscle Testing)   Strength Comments BLE's WFL, not formally tested   Bed Mobility   Bed Mobility supine-sit;sit-supine   Supine-Sit [place] (Bed Mobility) supervision   Sit-Supine [place] (Bed Mobility) supervision   Comment, (Bed Mobility) supervision supine<>sit transfers   Transfers   Transfers sit-stand transfer   Sit-Stand Transfer   Sit-Stand [place] (Transfers) supervision   Assistive Device (Sit-Stand Transfers) cane, straight   Comment, (Sit-Stand Transfer) sit<>stand transfers with SBA and SEC   Gait/Stairs (Locomotion)   [place] Level (Gait) contact guard   Assistive Device (Gait) cane, straight;walker, 4-wheeled   Distance in Feet 10' eval   Distance in Feet (Gait) 225', 75'   Comment, (Gait/Stairs) ambulating with SEC and 4ww with initial CGA   Balance   Balance Comments impaired balance   Sensory Examination   Sensory Perception Comments patient reports BLE numbness, has baseline peripheral neuropathy.   Clinical Impression   Criteria for Skilled Therapeutic Intervention Yes, treatment indicated   PT Diagnosis (PT) impaired mobility   Influenced by the following impairments reduced activity tolerance, impaired balance   Functional limitations due to impairments impaired functional mobility, impaired gait, transfers   Clinical Presentation (PT Evaluation Complexity) Stable/Uncomplicated   Clinical Presentation Rationale clinical judgement   Clinical Decision Making (Complexity)  low complexity   Planned Therapy Interventions (PT) balance training;bed mobility training;gait training;neuromuscular re-education;patient/family education;stair training;strengthening;transfer training;progressive activity/exercise   Anticipated Equipment Needs at Discharge (PT) walker, rolling  (4ww)   Risk & Benefits of therapy have been explained evaluation/treatment results reviewed;care plan/treatment goals reviewed;risks/benefits reviewed;current/potential barriers reviewed;participants voiced agreement with care plan;participants included;patient   PT Total Evaluation Time   PT Eval, Low Complexity Minutes (30681) 9   Physical Therapy Goals   PT Frequency Daily   PT Predicted Duration/Target Date for Goal Attainment 05/25/23   PT Goals Bed Mobility;Transfers;Gait   PT: Bed Mobility Independent;Supine to/from sit   PT: Transfers Modified independent;Sit to/from stand;Assistive device   PT: Gait Modified independent;Assistive device;Greater than 200 feet   Interventions   Interventions Quick Adds Gait Training;Therapeutic Activity   Therapeutic Activity   Therapeutic Activities: dynamic activities to improve functional performance Minutes (02701) 23   Symptoms Noted During/After Treatment Fatigue   Treatment Detail/Skilled Intervention Patient supine in bed at beginning of session, agreeable to participate in PT. Patient performing supine<>sit transfers with supervision. Sit<>stands with SBA and assistive device. Able to don socks with superivsion while seated at EOB. Discussed patient's use of edemawear for management of lymphedema, patient donning with supervision. Discussed overlap at bottom of edemawear near toes for increased compression distally. Performing sit<>stands from toilet with SBA and grab bar. No instability or LOB throughout session. Patient supine in bed at end of session with call light next to him and all needs within reach.   Gait Training   Gait Training Minutes (65032) 11   Symptoms  Noted During/After Treatment (Gait Training) fatigue   Treatment Detail/Skilled Intervention Ambulating 225' with 4ww and SBA-CGA. Cues for use of 4ww. Cues for upright posture and use of brakes as needed while ambulating. Cues and visual demonstration for placing walker against wall, lock brakes, prior to sitting down on 4ww. Patient completed additional bout of ambulation for 75' with SEC and CGA. No overt LOB throughout. Decreased step length and gait velocity noted.   PT Discharge Planning   PT Plan likely needing one more session to progress IND with mobility. progress gait distance with SEC vs. 4ww, balance exercises   PT Discharge Recommendation (DC Rec) home with outpatient physical therapy   PT Rationale for DC Rec Patient near baseline functional status. Presents with reduced activity tolerance and impaired balance resulting in need for assistive device. Patient reporting several falls/near misses at home, currently at falls risk. Mobilizing well during session with use of assistive device and standby assist. Recommend discharge home with outpatient PT for improvement of strength, balance, and safety with functional mobility.   PT Brief overview of current status supervision bed mobility, sit<>stands with SBA and AD, ambulation with AD and CGA   Total Session Time   Timed Code Treatment Minutes 34   Total Session Time (sum of timed and untimed services) 43

## 2023-05-21 VITALS
HEIGHT: 66 IN | DIASTOLIC BLOOD PRESSURE: 70 MMHG | WEIGHT: 244.1 LBS | OXYGEN SATURATION: 98 % | TEMPERATURE: 96.9 F | SYSTOLIC BLOOD PRESSURE: 117 MMHG | HEART RATE: 74 BPM | RESPIRATION RATE: 18 BRPM | BODY MASS INDEX: 39.23 KG/M2

## 2023-05-21 LAB
CREAT SERPL-MCNC: 1.06 MG/DL (ref 0.67–1.17)
GFR SERPL CREATININE-BSD FRML MDRD: 77 ML/MIN/1.73M2
GLUCOSE BLDC GLUCOMTR-MCNC: 194 MG/DL (ref 70–99)
GLUCOSE BLDC GLUCOMTR-MCNC: 221 MG/DL (ref 70–99)
GLUCOSE BLDC GLUCOMTR-MCNC: 247 MG/DL (ref 70–99)
GLUCOSE SERPL-MCNC: 170 MG/DL (ref 70–99)
MAGNESIUM SERPL-MCNC: 1.9 MG/DL (ref 1.7–2.3)
PLATELET # BLD AUTO: 183 10E3/UL (ref 150–450)
POTASSIUM SERPL-SCNC: 4.1 MMOL/L (ref 3.4–5.3)

## 2023-05-21 PROCEDURE — 36415 COLL VENOUS BLD VENIPUNCTURE: CPT | Performed by: INTERNAL MEDICINE

## 2023-05-21 PROCEDURE — 250N000013 HC RX MED GY IP 250 OP 250 PS 637: Performed by: INTERNAL MEDICINE

## 2023-05-21 PROCEDURE — 250N000011 HC RX IP 250 OP 636: Performed by: INTERNAL MEDICINE

## 2023-05-21 PROCEDURE — 99239 HOSP IP/OBS DSCHRG MGMT >30: CPT | Performed by: HOSPITALIST

## 2023-05-21 PROCEDURE — 85049 AUTOMATED PLATELET COUNT: CPT | Performed by: INTERNAL MEDICINE

## 2023-05-21 PROCEDURE — 83735 ASSAY OF MAGNESIUM: CPT | Performed by: HOSPITALIST

## 2023-05-21 PROCEDURE — 82565 ASSAY OF CREATININE: CPT | Performed by: INTERNAL MEDICINE

## 2023-05-21 PROCEDURE — 84132 ASSAY OF SERUM POTASSIUM: CPT | Performed by: HOSPITALIST

## 2023-05-21 PROCEDURE — 82947 ASSAY GLUCOSE BLOOD QUANT: CPT | Performed by: HOSPITALIST

## 2023-05-21 RX ORDER — CEFADROXIL 500 MG/1
500 CAPSULE ORAL 2 TIMES DAILY
Qty: 14 CAPSULE | Refills: 0 | Status: SHIPPED | OUTPATIENT
Start: 2023-05-21 | End: 2023-07-05

## 2023-05-21 RX ORDER — CEFADROXIL 500 MG/1
500 CAPSULE ORAL 2 TIMES DAILY
Qty: 14 CAPSULE | Refills: 0 | Status: SHIPPED | OUTPATIENT
Start: 2023-05-21 | End: 2023-05-21

## 2023-05-21 RX ORDER — ACETAMINOPHEN 325 MG/1
650 TABLET ORAL EVERY 6 HOURS PRN
Qty: 60 TABLET | Refills: 0 | Status: SHIPPED | OUTPATIENT
Start: 2023-05-21 | End: 2023-06-20

## 2023-05-21 RX ORDER — CEFADROXIL 1000 MG/1
1 TABLET ORAL DAILY
Qty: 5 TABLET | Refills: 0 | Status: SHIPPED | OUTPATIENT
Start: 2023-05-21 | End: 2023-05-21

## 2023-05-21 RX ORDER — ACETAMINOPHEN 325 MG/1
650 TABLET ORAL EVERY 6 HOURS PRN
Qty: 60 TABLET | Refills: 0 | Status: SHIPPED | OUTPATIENT
Start: 2023-05-21 | End: 2023-05-21

## 2023-05-21 RX ADMIN — MODAFINIL 300 MG: 200 TABLET ORAL at 09:04

## 2023-05-21 RX ADMIN — LOSARTAN POTASSIUM 100 MG: 100 TABLET, FILM COATED ORAL at 09:04

## 2023-05-21 RX ADMIN — Medication 600 MG: at 09:04

## 2023-05-21 RX ADMIN — HYDROCHLOROTHIAZIDE 37.5 MG: 12.5 CAPSULE ORAL at 09:04

## 2023-05-21 RX ADMIN — CEFAZOLIN SODIUM 2 G: 2 INJECTION, SOLUTION INTRAVENOUS at 06:14

## 2023-05-21 RX ADMIN — MULTIPLE VITAMINS W/ MINERALS TAB 1 TABLET: TAB at 09:04

## 2023-05-21 RX ADMIN — ASPIRIN 81 MG: 81 TABLET, COATED ORAL at 09:04

## 2023-05-21 RX ADMIN — METOPROLOL SUCCINATE 50 MG: 50 TABLET, EXTENDED RELEASE ORAL at 09:04

## 2023-05-21 RX ADMIN — OXYCODONE HYDROCHLORIDE 10 MG: 5 TABLET ORAL at 06:14

## 2023-05-21 RX ADMIN — INSULIN ASPART 5 UNITS: 100 INJECTION, SOLUTION INTRAVENOUS; SUBCUTANEOUS at 09:02

## 2023-05-21 RX ADMIN — TESTOSTERONE 50 MG OF TESTOSTERONE: 50 GEL TOPICAL at 09:05

## 2023-05-21 RX ADMIN — ACETAMINOPHEN 650 MG: 325 TABLET ORAL at 06:14

## 2023-05-21 RX ADMIN — METFORMIN ER 500 MG 1000 MG: 500 TABLET ORAL at 09:03

## 2023-05-21 RX ADMIN — PREGABALIN 100 MG: 100 CAPSULE ORAL at 09:04

## 2023-05-21 RX ADMIN — BUPROPION HYDROCHLORIDE 150 MG: 150 TABLET, FILM COATED, EXTENDED RELEASE ORAL at 09:05

## 2023-05-21 RX ADMIN — CEFAZOLIN SODIUM 2 G: 2 INJECTION, SOLUTION INTRAVENOUS at 15:01

## 2023-05-21 RX ADMIN — ATORVASTATIN CALCIUM 30 MG: 20 TABLET, FILM COATED ORAL at 09:03

## 2023-05-21 RX ADMIN — CARIPRAZINE 4.5 MG: 4.5 CAPSULE, GELATIN COATED ORAL at 09:04

## 2023-05-21 RX ADMIN — INSULIN ASPART 4 UNITS: 100 INJECTION, SOLUTION INTRAVENOUS; SUBCUTANEOUS at 12:24

## 2023-05-21 RX ADMIN — DULOXETINE HYDROCHLORIDE 30 MG: 30 CAPSULE, DELAYED RELEASE ORAL at 09:03

## 2023-05-21 ASSESSMENT — ACTIVITIES OF DAILY LIVING (ADL)
ADLS_ACUITY_SCORE: 20

## 2023-05-21 NOTE — DISCHARGE SUMMARY
"Discharge Summary    Parmjit Hernández MRN# [id number]   YOB: 1955 Age: 67 year old     Date of Admission:  5/18/2023  Date of Discharge:  5/21/2023  Admitting Physician:  Nicolás Ji MD  Discharge Physician:  Nohemi Merlos MD Pager [phone] Office [phone]  Discharging Service:  [facility] Hospitalist Service     Primary Provider: Sherwin Mejia          Discharge Diagnosis:   See problem-oriented hospital course for diagnoses addressed during this hospital stay.             Discharge Disposition:   Discharged to home          Condition on Discharge:   Discharge condition: Stable   Discharge vitals: Blood pressure 117/70, pulse 74, temperature 96.9  F (36.1  C), temperature source Axillary, resp. rate 18, height 1.676 m (5' 6\"), weight 110.7 kg (244 lb 1.6 oz), SpO2 98 %.   Code status on discharge: Full Code          Discharge Medications:        Review of your medicines      UNREVIEWED medicines. Ask your doctor about these medicines      Dose / Directions   dulaglutide 0.75 MG/0.5ML pen  Commonly known as: TRULICITY  Used for: Type 2 diabetes mellitus with other circulatory complication, without long-term current use of insulin (H)      Dose: 0.75 mg  Inject 0.75 mg Subcutaneous every 7 days  Quantity: 2 mL  Refills: 3        CONTINUE these medicines which may have CHANGED, or have new prescriptions. If we are uncertain of the size of tablets/capsules you have at home, strength may be listed as something that might have changed.      Dose / Directions   * acetaminophen 500 MG tablet  Commonly known as: TYLENOL  This may have changed: Another medication with the same name was added. Make sure you understand how and when to take each.      Dose: 1,000 mg  Take 1,000 mg by mouth every 8 hours as needed  Refills: 0     * acetaminophen 325 MG tablet  Commonly known as: TYLENOL  This may have changed: You were already taking a medication with the same name, and this prescription was " added. Make sure you understand how and when to take each.  Used for: Bilateral lower leg cellulitis      Dose: 650 mg  Take 2 tablets (650 mg) by mouth every 6 hours as needed for mild pain or other (and adjunct with moderate or severe pain or per patient request)  Quantity: 60 tablet  Refills: 0         * This list has 2 medication(s) that are the same as other medications prescribed for you. Read the directions carefully, and ask your doctor or other care provider to review them with you.            CONTINUE these medicines which have NOT CHANGED      Dose / Directions   ammonium lactate 12 % external cream  Commonly known as: AMLACTIN  Used for: Non-pressure ulcer of right lower extremity with fat layer exposed (H), Chronic ulcer of left leg with fat layer exposed (H)      Apply topically daily To feet and legs daily & as needed  Quantity: 385 g  Refills: 1     aspirin 81 MG tablet  Commonly known as: Aspirin Low Dose  Used for: Hyperlipidemia LDL goal <100, Hypertension goal BP (blood pressure) < 140/90      Dose: 81 mg  Take 1 tablet (81 mg) by mouth daily  Quantity: 30 tablet  Refills: 3     atorvastatin 20 MG tablet  Commonly known as: LIPITOR  Used for: Hyperlipidemia LDL goal <100      Dose: 30 mg  Take 1.5 tablets (30 mg) by mouth daily  Quantity: 135 tablet  Refills: 3     blood glucose lancets standard  Commonly known as: NO BRAND SPECIFIED  Used for: Type 2 diabetes mellitus with other specified complication, without long-term current use of insulin (H)      Use to test blood sugar 1 time daily or as directed.  Quantity: 100 lancet  Refills: 4     blood glucose test strip  Commonly known as: NO BRAND SPECIFIED  Used for: Type 2 diabetes mellitus with other specified complication, without long-term current use of insulin (H)      Use to test blood sugar 1 time daily or as directed.  Quantity: 200 strip  Refills: 4     buPROPion 150 MG 24 hr tablet  Commonly known as: WELLBUTRIN XL      Dose: 150  mg  Take 150 mg by mouth every morning  Refills: 0     calcium carbonate 1500 (600 Ca) MG tablet  Commonly known as: OS-LUIS      Dose: 600 mg  Take 600 mg by mouth daily  Refills: 0     cariprazine 4.5 MG capsule  Commonly known as: VRAYLAR      Dose: 4.5 mg  Take 4.5 mg by mouth daily  Refills: 0     cefadroxil 500 MG capsule  Commonly known as: DURICEF  Used for: Bilateral lower leg cellulitis      Dose: 500 mg  Take 1 capsule (500 mg) by mouth 2 times daily for 7 days  Quantity: 14 capsule  Refills: 0     doxycycline hyclate 100 MG capsule  Commonly known as: VIBRAMYCIN  Used for: Non-pressure ulcer of right lower extremity with fat layer exposed (H), Chronic ulcer of left leg with fat layer exposed (H)      Dose: 100 mg  Take 1 capsule (100 mg) by mouth 2 times daily for 30 days  Quantity: 60 capsule  Refills: 0     DULoxetine 30 MG capsule  Commonly known as: CYMBALTA      Dose: 30 mg  Take 30 mg by mouth 2 times daily  Refills: 0     Rosalia-C Tabs  Used for: Chronic ulcer of right leg, limited to breakdown of skin (H)      Dose: 1,000 mg  Take 1,000 mg by mouth daily  Quantity: 90 tablet  Refills: 3     Folic Acid-Vit B6-Vit B12 0.5-5-0.2 MG Tabs  Used for: Chronic ulcer of right leg, limited to breakdown of skin (H)      Dose: 1 tablet  Take 1 tablet by mouth daily  Quantity: 90 tablet  Refills: 3     glucose 40 % (400 mg/mL) gel  Used for: Type 2 diabetes mellitus without complication, unspecified whether long term insulin use (H)      Dose: 15-30 g  Take 15-30 g by mouth every 15 minutes as needed for low blood sugar  Quantity: 15 g  Refills: 0     hydrochlorothiazide 25 MG tablet  Commonly known as: HYDRODIURIL  Used for: Hypertension goal BP (blood pressure) < 140/90      Dose: 37.5 mg  Take 1.5 tablets (37.5 mg) by mouth daily  Quantity: 135 tablet  Refills: 0     insulin glargine 100 UNIT/ML pen  Commonly known as: LANTUS PEN  Used for: Type 2 diabetes mellitus without complication, unspecified whether  long term insulin use (H)      Dose: 10 Units  Inject 10 Units Subcutaneous At Bedtime  Quantity: 15 mL  Refills: 0     loperamide 2 MG capsule  Commonly known as: IMODIUM  Used for: Loose stools      Dose: 2 mg  Take 1 capsule (2 mg) by mouth 4 times daily as needed for diarrhea  Refills: 0     losartan 100 MG tablet  Commonly known as: COZAAR  Used for: Benign essential hypertension      Dose: 100 mg  Take 1 tablet (100 mg) by mouth daily  Quantity: 90 tablet  Refills: 1     medication given by implanted intrathecal pump      continuous Drug # 1: Fentanyl (Sublimaze) - Conc: 2000 mcg/mL - Total Dose / 24 hours: 1663.3 mcg  Drug # 2: Bupivacaine (Marcaine)  - Conc: 20 mg/mL - Total Dose / 24 hours: 16.633 mg  Drug # 3: Morphine (Duramorph or Infumorph)  - Conc: 9 mg/mL - Total Dose / 24 hours: 7.484 mg    Rate: 0.0325 mL/hr  Pump Reservoir Volume: 40 mL  Pump Reservoir Alarm Volume: 2 ml  Outside Clinic & Provider: [facility]  Last Refill Date: 5/18/2023  Next Refill Date: 6/25/2023  [place] Alarm Date:  Pump : [employer] SynchroMed II    Boluses: Up to 3 per day, 3 minute duration. Lock-out every 6 hours  Fentanyl: 99.9 mcg/dose  Bupivacaine: 0.999 mg/dose  Morphine: 0.4497 mg/dose  Refills: 0     metFORMIN 500 MG 24 hr tablet  Commonly known as: GLUCOPHAGE XR  Used for: Type 2 diabetes mellitus with other specified complication, without long-term current use of insulin (H)      Dose: 1,000 mg  Take 2 tablets (1,000 mg) by mouth 2 times daily (with meals)  Quantity: 360 tablet  Refills: 1     metoprolol succinate ER 50 MG 24 hr tablet  Commonly known as: TOPROL XL      Dose: 50 mg  Take 50 mg by mouth daily  Refills: 0     metroNIDAZOLE 500 MG tablet  Commonly known as: FLAGYL  Used for: Non-pressure ulcer of right lower extremity with fat layer exposed (H), Chronic ulcer of left leg with fat layer exposed (H)      Crush and sprinkle light layer over right leg, left leg  "and right toe wounds daily  Quantity: 60 tablet  Refills: 0     modafinil 200 MG tablet  Commonly known as: PROVIGIL  Used for: Obstructive sleep apnea syndrome      Dose: 300 mg  Take 1.5 tablets (300 mg) by mouth daily  Quantity: 6 tablet  Refills: 0     multivitamin w/minerals tablet      Dose: 1 tablet  Take 1 tablet by mouth daily Men's 50+  Refills: 0     nitroGLYcerin 0.4 MG sublingual tablet  Commonly known as: NITROSTAT      Dose: 0.4 mg  Place 0.4 mg under the tongue every 5 minutes as needed for chest pain For chest pain place 1 tablet under the tongue every 5 minutes for 3 doses. If symptoms persist 5 minutes after 1st dose call 911.  Refills: 0     OVER-THE-COUNTER      Dose: 3 capsule  Take 3 capsules by mouth daily Supplement for brain health, unknown brand/ingredients  Refills: 0     oxyCODONE-acetaminophen 5-325 MG tablet  Commonly known as: PERCOCET      Dose: 1 tablet  Take 1 tablet by mouth 2 times daily  Refills: 0     POTASSIUM PO      Dose: 1 tablet  Take 1 tablet by mouth daily Unspecified tablet strength; patient estimated \"600 mg, +/- 200 mg\", takes for leg cramps  Refills: 0     pregabalin 100 MG capsule  Commonly known as: LYRICA      Dose: 100 mg  Take 100 mg by mouth Take up to 4 times per day  Refills: 0     senna-docusate 8.6-50 MG tablet  Commonly known as: SENOKOT-S/PERICOLACE      Dose: 1-2 tablet  Take 1-2 tablets by mouth 2 times daily At baseline, Take 1 tablet twice daily. May take second tablet if needed.  Refills: 0     SUMAtriptan 50 MG tablet  Commonly known as: IMITREX  Used for: Mixed common migraine and muscle contraction headache      Dose: 50 mg  Take 1 tablet (50 mg) by mouth at onset of headache for migraine May repeat in 2 hours. Max 4 tablets/24 hours.  Quantity: 8 tablet  Refills: 1     tamsulosin 0.4 MG capsule  Commonly known as: FLOMAX  Used for: Urinary retention with incomplete bladder emptying      Dose: 0.4 mg  Take 1 capsule (0.4 mg) by mouth 2 times " daily  Quantity: 30 capsule  Refills: 3     testosterone 50 MG/5GM (1%) topical gel  Commonly known as: ANDROGEL/TESTIM      Dose: 50 mg of testosterone  Place 50 mg of testosterone onto the skin daily  Refills: 0     vitamin B-Complex      Dose: 3 tablet  Take 3 tablets by mouth daily  Refills: 0     vitamin D3 1.25 MG (75509 UT) capsule  Commonly known as: CHOLECALCIFEROL  Used for: Chronic ulcer of right leg, limited to breakdown of skin (H)      Dose: 50,000 Units  Take 1 capsule (50,000 Units) by mouth every 7 days  Quantity: 90 capsule  Refills: 3           Where to get your medicines      These medications were sent to [facility] [place]  [place], [place] - [address] [facility]  [address] [address] [address]    Phone: [phone]     acetaminophen 325 MG tablet    cefadroxil 500 MG capsule               Consultations:   None        Brief hx and Hospital Course:   This is a 67-year-old male with history of lymphedema, anxiety, bipolar disorder, depression, hypertension, hyperlipidemia, obesity, cirrhosis secondary to MEJIAS, restless leg syndrome, polyneuropathy, diabetes mellitus type 2, migraine, sleep apnea, not compliant with CPAP, peripheral vascular disease, followed by [facility] and has intrathecal pump, which has fentanyl, bupivacaine, and morphine, just refilled today, was recently admitted in the hospital from 05/11-05/15 for bilateral lower extremity cellulitis and was discharged on 05/15, sent home on 2 weeks of oral antibiotics, but went home and did not take his medications and now comes here with weakness, fatigue, tiredness, and worsening cellulitis of the left leg.     Left lower extremity cellulitis    Likely Chronic venous stasis  Likely Chronic erythema due to above.  Nonhealing ulcers on the left shin as well  Bilateral lower extremity cellulitis, was treated with intravenous cefazolin and did improve significantly as reviewed on the pictures in Epic, but did  not fill up his medications and his left leg is now worse.  .   At this point, cellulitis looks to be improved. No warmth/ minimal TTP.  Bilateral erythema is likely due to chronic venous stasis..  Currently on cefazolin.   -- Cefadroxil x 7 days.  -- Referral to wound clinic.  -- Encourage elevation of lower extremities.   - F/U with PCP.        Generalized weakness/fatigue/tiredness/fogginess   Resolved.  Referral to PT.     Hx NORMA: does has not been using his CPAP as he [place]  - Resume CPAP.  - CC consult.      Diabetes mellitus type 2, uncontrolled:     He is on 10 units of Lantus at night and metformin PTA. He was placed on 12 units Lantus at night here. Also on ssi and carb coverage. A1c on 5/12/23 was 8.8.  Apparently he is not using his insulin or metformin at home.   Resume PTA regimen.  F/u with PCP.     Bipolar disorder:    He has been stable.  He is on cariprazine and Cymbalta.  We will continue with that.     Polyneuropathy/restless leg syndrome/chronic pain:    The patient is followed by [facility].  He has an intrathecal pump, which was refilled today, which includes fentanyl, morphine, and bupivacaine.  His pain is much better controlled.    Outpatient follow up..     Hypertension  Cont PTA meds.     Hyperlipidemia:  On Lipitor.  We will continue with that.       Morbid obesity and sleep apnea:   Need to lose weight, diet, exercise, and use CPAP machine regularly.     History of amyloidosis:    Followed outpatient with his primary care physician.      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  Outpatient follow up.     Thrombocytopenia:   Likely due to infection.  Resolved.    Wound care.  Walker ordered.    Pt is being discharged home in stable conditions.         DISCHARGE EXAM:   Temp:  [96.9  F (36.1  C)-98.1  F (36.7  C)] 96.9  F (36.1  C)  Pulse:  [74-80] 74  Resp:  [16-18] 18  BP: (117-143)/(70-94) 117/70  SpO2:   [94 %-98 %] 98 %  Wt Readings from Last 5 Encounters:   05/21/23 110.7 kg (244 lb 1.6 oz)   05/13/23 107.9 kg (237 lb 14 oz)   04/17/23 109.6 kg (241 lb 11.2 oz)   03/31/23 111.8 kg (246 lb 6.4 oz)   03/21/23 111.2 kg (245 lb 3.2 oz)            Pertinent Tests and Procedures:   Per chart.             Pending Results:   None.          Discharge Instructions and Follow-Up:   Discharge diet: Orders Placed This Encounter      Combination Diet Regular Diet Adult; Moderate Consistent Carb (60 g CHO per Meal) Diet      Diet     Discharge activity: Activity as tolerated   Discharge follow-up: Follow up with primary care provider.  Wound clinic.   Outpatient therapy: None PT.   Home Care agency: None    Supplies and equipment: None   Lines and drains: None    Wound care: None   Other instructions: None      More than 30 minutes were required for coordination of care for this discharge.         Procedures / Labs / Imaging:     Results for orders placed or performed during the hospital encounter of 05/18/23   XR Chest 2 Views     Status: None    Narrative    CHEST TWO VIEWS    5/18/2023 2:22 PM     HISTORY: Dyspnea, infiltrate.    COMPARISON: Chest x-ray on 12/5/2020      Impression    IMPRESSION: AP and lateral views of the chest were obtained.  Cardiomediastinal silhouette is within normal limits. No suspicious  focal pulmonary opacities. No significant pleural effusion or  pneumothorax.    RAVI CONRAD MD         SYSTEM ID:  [id number]   Comprehensive metabolic panel     Status: Abnormal   Result Value Ref Range    Sodium 138 136 - 145 mmol/L    Potassium 4.1 3.4 - 5.3 mmol/L    Chloride 100 98 - 107 mmol/L    Carbon Dioxide (CO2) 29 22 - 29 mmol/L    Anion Gap 9 7 - 15 mmol/L    Urea Nitrogen 14.4 8.0 - 23.0 mg/dL    Creatinine 0.77 0.67 - 1.17 mg/dL    Calcium 9.2 8.8 - 10.2 mg/dL    Glucose 265 (H) 70 - 99 mg/dL    Alkaline Phosphatase 100 40 - 129 U/L    AST 31 10 - 50 U/L    ALT 26 10 - 50 U/L    Protein Total 6.5  6.4 - 8.3 g/dL    Albumin 4.0 3.5 - 5.2 g/dL    Bilirubin Total 0.7 <=1.2 mg/dL    GFR Estimate >90 >60 mL/min/1.73m2   Lactic acid whole blood     Status: Normal   Result Value Ref Range    Lactic Acid 0.7 0.7 - 2.0 mmol/L   UA with Microscopic reflex to Culture     Status: Abnormal    Specimen: Urine, NOS   Result Value Ref Range    Color Urine Yellow Colorless, Straw, Light Yellow, Yellow    Appearance Urine Clear Clear    Glucose Urine 300 (A) Negative mg/dL    Bilirubin Urine Negative Negative    Ketones Urine 10 (A) Negative mg/dL    Specific Gravity Urine 1.026 1.003 - 1.035    Blood Urine Negative Negative    pH Urine 6.0 5.0 - 7.0    Protein Albumin Urine 20 (A) Negative mg/dL    Urobilinogen Urine Normal Normal, 2.0 mg/dL    Nitrite Urine Negative Negative    Leukocyte Esterase Urine Negative Negative    Mucus Urine Present (A) None Seen /LPF    RBC Urine <1 <=2 /HPF    WBC Urine <1 <=5 /HPF    Narrative    Urine Culture not indicated   CBC with platelets and differential     Status: Abnormal   Result Value Ref Range    WBC Count 4.2 4.0 - 11.0 10e3/uL    RBC Count 4.33 (L) 4.40 - 5.90 10e6/uL    Hemoglobin 14.1 13.3 - 17.7 g/dL    Hematocrit 41.2 40.0 - 53.0 %    MCV 95 78 - 100 fL    MCH 32.6 26.5 - 33.0 pg    MCHC 34.2 31.5 - 36.5 g/dL    RDW 13.6 10.0 - 15.0 %    Platelet Count 127 (L) 150 - 450 10e3/uL    % Neutrophils 75 %    % Lymphocytes 12 %    % Monocytes 8 %    % Eosinophils 3 %    % Basophils 1 %    % Immature Granulocytes 1 %    NRBCs per 100 WBC 0 <1 /100    Absolute Neutrophils 3.2 1.6 - 8.3 10e3/uL    Absolute Lymphocytes 0.5 (L) 0.8 - 5.3 10e3/uL    Absolute Monocytes 0.3 0.0 - 1.3 10e3/uL    Absolute Eosinophils 0.1 0.0 - 0.7 10e3/uL    Absolute Basophils 0.0 0.0 - 0.2 10e3/uL    Absolute Immature Granulocytes 0.0 <=0.4 10e3/uL    Absolute NRBCs 0.0 10e3/uL   BNP     Status: Normal   Result Value Ref Range    N terminal Pro BNP Inpatient 253 0 - 900 pg/mL   Troponin T, High Sensitivity      Status: Abnormal   Result Value Ref Range    Troponin T, High Sensitivity 23 (H) <=22 ng/L   Creatinine     Status: Normal   Result Value Ref Range    Creatinine 0.71 0.67 - 1.17 mg/dL    GFR Estimate >90 >60 mL/min/1.73m2   Magnesium     Status: Normal   Result Value Ref Range    Magnesium 1.9 1.7 - 2.3 mg/dL   Glucose by meter     Status: Abnormal   Result Value Ref Range    GLUCOSE BY METER POCT 232 (H) 70 - 99 mg/dL   Renal panel     Status: Abnormal   Result Value Ref Range    Sodium 138 136 - 145 mmol/L    Potassium 4.9 3.4 - 5.3 mmol/L    Chloride 102 98 - 107 mmol/L    Carbon Dioxide (CO2) 26 22 - 29 mmol/L    Anion Gap 10 7 - 15 mmol/L    Glucose 224 (H) 70 - 99 mg/dL    Urea Nitrogen 16.3 8.0 - 23.0 mg/dL    Creatinine 0.79 0.67 - 1.17 mg/dL    GFR Estimate >90 >60 mL/min/1.73m2    Calcium 9.0 8.8 - 10.2 mg/dL    Albumin 3.9 3.5 - 5.2 g/dL    Phosphorus 2.7 2.5 - 4.5 mg/dL   Magnesium     Status: Normal   Result Value Ref Range    Magnesium 1.9 1.7 - 2.3 mg/dL   Glucose by meter     Status: Abnormal   Result Value Ref Range    GLUCOSE BY METER POCT 187 (H) 70 - 99 mg/dL   Glucose by meter     Status: Abnormal   Result Value Ref Range    GLUCOSE BY METER POCT 150 (H) 70 - 99 mg/dL   CBC with platelets and differential     Status: Abnormal   Result Value Ref Range    WBC Count 5.4 4.0 - 11.0 10e3/uL    RBC Count 4.11 (L) 4.40 - 5.90 10e6/uL    Hemoglobin 13.6 13.3 - 17.7 g/dL    Hematocrit 39.4 (L) 40.0 - 53.0 %    MCV 96 78 - 100 fL    MCH 33.1 (H) 26.5 - 33.0 pg    MCHC 34.5 31.5 - 36.5 g/dL    RDW 13.7 10.0 - 15.0 %    Platelet Count 148 (L) 150 - 450 10e3/uL    % Neutrophils 76 %    % Lymphocytes 12 %    % Monocytes 8 %    % Eosinophils 3 %    % Basophils 1 %    % Immature Granulocytes 0 %    NRBCs per 100 WBC 0 <1 /100    Absolute Neutrophils 4.1 1.6 - 8.3 10e3/uL    Absolute Lymphocytes 0.7 (L) 0.8 - 5.3 10e3/uL    Absolute Monocytes 0.4 0.0 - 1.3 10e3/uL    Absolute Eosinophils 0.2 0.0 - 0.7 10e3/uL     Absolute Basophils 0.0 0.0 - 0.2 10e3/uL    Absolute Immature Granulocytes 0.0 <=0.4 10e3/uL    Absolute NRBCs 0.0 10e3/uL   Glucose by meter     Status: Abnormal   Result Value Ref Range    GLUCOSE BY METER POCT 197 (H) 70 - 99 mg/dL   Glucose by meter     Status: Abnormal   Result Value Ref Range    GLUCOSE BY METER POCT 262 (H) 70 - 99 mg/dL   Glucose by meter     Status: Abnormal   Result Value Ref Range    GLUCOSE BY METER POCT 257 (H) 70 - 99 mg/dL   Glucose by meter     Status: Abnormal   Result Value Ref Range    GLUCOSE BY METER POCT 243 (H) 70 - 99 mg/dL   Potassium     Status: Normal   Result Value Ref Range    Potassium 4.2 3.4 - 5.3 mmol/L   Magnesium (AM Draw)     Status: Normal   Result Value Ref Range    Magnesium 1.9 1.7 - 2.3 mg/dL   Glucose by meter     Status: Abnormal   Result Value Ref Range    GLUCOSE BY METER POCT 286 (H) 70 - 99 mg/dL   Glucose by meter     Status: Abnormal   Result Value Ref Range    GLUCOSE BY METER POCT 232 (H) 70 - 99 mg/dL   Glucose by meter     Status: Abnormal   Result Value Ref Range    GLUCOSE BY METER POCT 166 (H) 70 - 99 mg/dL   Glucose by meter     Status: Abnormal   Result Value Ref Range    GLUCOSE BY METER POCT 216 (H) 70 - 99 mg/dL   Glucose by meter     Status: Abnormal   Result Value Ref Range    GLUCOSE BY METER POCT 166 (H) 70 - 99 mg/dL   Platelet count     Status: Normal   Result Value Ref Range    Platelet Count 183 150 - 450 10e3/uL   Creatinine     Status: Normal   Result Value Ref Range    Creatinine 1.06 0.67 - 1.17 mg/dL    GFR Estimate 77 >60 mL/min/1.73m2   Potassium     Status: Normal   Result Value Ref Range    Potassium 4.1 3.4 - 5.3 mmol/L   Magnesium     Status: Normal   Result Value Ref Range    Magnesium 1.9 1.7 - 2.3 mg/dL   Glucose     Status: Abnormal   Result Value Ref Range    Glucose 170 (H) 70 - 99 mg/dL   Glucose by meter     Status: Abnormal   Result Value Ref Range    GLUCOSE BY METER POCT 247 (H) 70 - 99 mg/dL   Glucose by  meter     Status: Abnormal   Result Value Ref Range    GLUCOSE BY METER POCT 194 (H) 70 - 99 mg/dL   EKG 12-lead, tracing only     Status: None   Result Value Ref Range    Systolic Blood Pressure  mmHg    Diastolic Blood Pressure  mmHg    Ventricular Rate 92 BPM    Atrial Rate 92 BPM    MN Interval 192 ms    QRS Duration 118 ms     ms    QTc 455 ms    P Axis 24 degrees    R AXIS -57 degrees    T Axis -2 degrees    Interpretation ECG       Sinus rhythm  Left axis deviation  Incomplete right bundle branch block  Minimal voltage criteria for LVH, may be normal variant ( R in aVL )  Inferior infarct (cited on or before 17-AUG-2015)  Anterolateral infarct , age undetermined  Abnormal ECG  When compared with ECG of 06-DEC-2020 17:18,  Anterior infarct is now Present  Anterolateral infarct is now Present  Questionable change in initial forces of Inferior leads  Confirmed by GENERATED REPORT, COMPUTER ([id number]),  Emily Fitzgerald ([id number]) on 5/18/2023 1:12:49 PM     Blood Culture Peripheral Blood     Status: Normal (Preliminary result)    Specimen: Peripheral Blood   Result Value Ref Range    Culture No growth after 2 days    Blood Culture Line, venous     Status: Normal (Preliminary result)    Specimen: Line, venous; Blood   Result Value Ref Range    Culture No growth after 2 days    CBC with platelets differential     Status: Abnormal    Narrative    The following orders were created for panel order CBC with platelets differential.  Procedure                               Abnormality         Status                     ---------                               -----------         ------                     CBC with platelets and d...[[id number]]  Abnormal            Final result                 Please view results for these tests on the individual orders.   CBC with platelets differential     Status: Abnormal    Narrative    The following orders were created for panel order CBC with platelets differential.  Procedure                                Abnormality         Status                     ---------                               -----------         ------                     CBC with platelets and d...[[id number]]  Abnormal            Final result                 Please view results for these tests on the individual orders.

## 2023-05-21 NOTE — PLAN OF CARE
OT/Lymph-order received and chart reviewed and met with pt. He has chronic edema at baseline, knows how to care for it and has his own edema wear. He is able to don his edema wear himself and his legs do not need wrapping at this time as they are at his baseline. Pt is ind in ADLs, PT is addressing his mobility. Pt is up I'ly in room and fully dressed. No acute OT needs at this time.Will sign off

## 2023-05-21 NOTE — PROVIDER NOTIFICATION
Paged hospitalist to send patient discharge medication to his pharmacy, [facility] in [place].     Medications send to [facility] in [place].

## 2023-05-21 NOTE — PROGRESS NOTES
Care Management Follow Up    Length of Stay (days): 3    Expected Discharge Date: 05/21/2023     Concerns to be Addressed:       Patient plan of care discussed at interdisciplinary rounds: Yes    Anticipated Discharge Disposition: Home, Home Care, Skilled Nursing Facility     Anticipated Discharge Services:    Anticipated Discharge DME:      Patient/family educated on Medicare website which has current facility and service quality ratings:    Education Provided on the Discharge Plan:    Patient/Family in Agreement with the Plan: yes    Referrals Placed by CM/SW:    Private pay costs discussed: Not applicable    Additional Information:  Patient requeted to see SW prior to discharge. Writer met with patient and friend at bedside. Patient discussed having some issues at home with finances and would like information on Emergency Assistance, has concerns with showering alone and how to apply for MA. He mentioned that he has worked with [facility] in the past for financial assistance and writer explained they would likely have similar, if not the same, requirements for financial help but that writer will print off information from [facility] website to review. Writer explained that there are programs for those who are on MA to look into for community based services and asked if he's ever applied for MA. He reported that he has not and would like some information on how to apply. Writer printed off materials from [facility] website and discussed that going through [place] [employer] may be his best option to speak to a navigator who can assist him and he agreed. Writer also provided patient with a copy of the list of agencies that offer private pay services at home and then explained that if he gets on MA and can apply for waivers, these could be covered under that program vs privately.       WILBER Chawla

## 2023-05-21 NOTE — PROGRESS NOTES
"    Trauma/Ortho/Medical (Choose one) Medical    Diagnosis: LLE cellulitis  POD#:na  Mental Status:A&O x4  Activity/dangle Independent in room  Diet:reg  Pain:no  Parker/Voiding: voiding adequately in br  Tele/Restraints/Iso:no  02/LDA:AMARI  D/C Date:Today at 1530  Other Info: Discharging today. Refused to follow instructions for wound care stating \"I already know what to do, the wound nurse showed me already\". Discharge meds send to [facility] in [place].      "

## 2023-05-21 NOTE — PLAN OF CARE
Goal Outcome Evaluation:    Pt A&O x4. Up ad paresh. VSS on RA. BG check. Dressing to LLE CDI. Lymphedema in place. CMS intact, w/ baseline numbness. Oxycodone & tylenol x2 for pain in addition to pt intrathecal pain pump. Xanax at HS. Home vs. TCU.                    OB/GYN Adelso Gonsalez. In agreement w/ plan.      Kamar Thompson DO, PGY3  Ob/Gyn Resident  Pager: [phone]  [facility], [address]  2017, 3:37 PM

## 2023-05-22 ENCOUNTER — PATIENT OUTREACH (OUTPATIENT)
Dept: CARE COORDINATION | Facility: CLINIC | Age: 68
End: 2023-05-22
Payer: COMMERCIAL

## 2023-05-22 NOTE — PROGRESS NOTES
"Clinic Care Coordination Contact  [facility]: Post-Discharge Note  SITUATION                                                      Admission:    Admission Date: 05/18/23   Reason for Admission: Generalized Weakness and Shortness of Breath  Discharge:   Discharge Date: 05/21/23  Discharge Diagnosis: Cellulitis of left lower extremity    BACKGROUND                                                      Per hospital discharge summary and inpatient provider notes:This is a 67-year-old male with history of lymphedema, anxiety, bipolar disorder, depression, hypertension, hyperlipidemia, obesity, cirrhosis secondary to MEJIAS, restless leg syndrome, polyneuropathy, diabetes mellitus type 2, migraine, sleep apnea, not compliant with CPAP, peripheral vascular disease, followed by [facility] and has intrathecal pump, which has fentanyl, bupivacaine, and morphine, just refilled today, was recently admitted in the hospital from 05/11-05/15 for bilateral lower extremity cellulitis and was discharged on 05/15, sent home on 2 weeks of oral antibiotics, but went home and did not take his medications and now comes here with weakness, fatigue, tiredness, and worsening cellulitis of the left leg.      ASSESSMENT           Discharge Assessment  How are you doing now that you are home?: \" I am doing really great, I have no pain \"  How are your symptoms? (Red Flag symptoms escalate to triage hotline per guidelines): Improved  Do you feel your condition is stable enough to be safe at home until your provider visit?: Yes  Does the patient have their discharge instructions? : Yes  Does the patient have questions regarding their discharge instructions? : No  Were you started on any new medications or were there changes to any of your previous medications? : Yes  Does the patient have all of their medications?: Yes  Do you have questions regarding any of your medications? : No  Do you have all of your needed medical supplies or " equipment (DME)?  (i.e. oxygen tank, CPAP, cane, etc.): Yes  Discharge follow-up appointment scheduled within 14 calendar days? : No    Post-op ([facility] Only)  If the patient had a surgery or procedure, do they have any questions for a nurse?: No             PLAN                                                      Outpatient Plan:Follow-up and recommended labs and tests  Follow up with primary care provider, Sherwin Mcdermott, within 7  days for hospital follow- up. No follow up labs or test are needed.     Future Appointments   Date Time Provider Department Center   6/1/2023 11:20 AM Jose Nash MD [facility]   6/5/2023  2:20 PM Anjali Elizabeth PA-C [facility]   6/14/2023 11:40 AM Jose Nash MD [facility]   9/15/2023  9:00 AM Dylan Jose MD [facility]    9/18/2023 12:00 PM LAB FIRST FLOOR [facility]   9/22/2023 12:00 PM Charly Hester MD [facility]         For any urgent concerns, please contact our 24 hour nurse triage line: [phone] ([phone])         Kody Shaver

## 2023-05-23 ENCOUNTER — TELEPHONE (OUTPATIENT)
Dept: FAMILY MEDICINE | Facility: CLINIC | Age: 68
End: 2023-05-23
Payer: COMMERCIAL

## 2023-05-23 PROBLEM — M25.512 ACUTE PAIN OF LEFT SHOULDER: Status: RESOLVED | Noted: 2023-04-20 | Resolved: 2023-05-23

## 2023-05-23 PROBLEM — S46.212A TEAR OF LEFT BICEPS MUSCLE: Status: RESOLVED | Noted: 2023-04-20 | Resolved: 2023-05-23

## 2023-05-23 LAB
BACTERIA BLD CULT: NO GROWTH
BACTERIA BLD CULT: NO GROWTH

## 2023-05-23 NOTE — TELEPHONE ENCOUNTER
MTM referral from: Transitions of Care (recent hospital discharge or ED visit)    MTM referral outreach attempt #2 on May 23, 2023 at 10:37 AM      Outcome: Patient not reachable after several attempts, will route to [place] Pharmacist/Provider as an FYI.  [facility] scheduling number is [phone].  Thank you for the referral.    Use VBC (DIO) for the carrier/Plan on the flowsheet    Selam Flores - [facility]         What Type Of Note Output Would You Prefer (Optional)?: Standard Output How Severe Are Your Spot(S)?: mild Have Your Spot(S) Been Treated In The Past?: has not been treated Hpi Title: Evaluation of Skin Lesions Year Removed: 1900

## 2023-05-23 NOTE — PROGRESS NOTES
DISCHARGE  Reason for Discharge: Patient has failed to schedule further appointments.    Equipment Issued: n/a    Discharge Plan: Patient to continue home program.   04/20/23 0500   Body Part   Goals listed below are for L shoulder   Goal #1   Goal #1 reaching   Previous Functional Level No restrictions   Current Functional Level Cannot reach ;overhead   Performance level 7/10 pain   STG Target Performance Reach ;behind back;overhead   Performance level 3-4/10 pain   Rationale for independent personal hygiene;for dressing;for bathing;for meal preparation;for safe driving, hook seat belt, reach shift lever and turn signal, using both hands on steering wheel   Due date 05/20/23   LTG Target Performance Reach;behind back;overhead   Performance Level 0-1/10 pain   Rationale for independent personal hygiene;for dressing;for bathing;for meal preparation;for safe driving, hook seat belt, reach shift lever and turn signal, using both hands on steering wheel   Due date 08/20/23         Referring Provider:  Dashawn Franklin

## 2023-05-25 ENCOUNTER — APPOINTMENT (OUTPATIENT)
Dept: GENERAL RADIOLOGY | Facility: CLINIC | Age: 68
End: 2023-05-25
Attending: EMERGENCY MEDICINE
Payer: COMMERCIAL

## 2023-05-25 ENCOUNTER — TELEPHONE (OUTPATIENT)
Dept: FAMILY MEDICINE | Facility: CLINIC | Age: 68
End: 2023-05-25
Payer: COMMERCIAL

## 2023-05-25 PROCEDURE — 99284 EMERGENCY DEPT VISIT MOD MDM: CPT

## 2023-05-25 PROCEDURE — 93005 ELECTROCARDIOGRAM TRACING: CPT

## 2023-05-25 NOTE — TELEPHONE ENCOUNTER
Spoke to patient  No breathing issues noted  No appointments available  [employer] message sent reviewing [facility] walk in location  Divina AGRAWAL RN

## 2023-05-25 NOTE — TELEPHONE ENCOUNTER
Pt calling stating that he got out of the hospital on Sunday. Pt stating that he thinks he broke a rib (reaching for something behind the dryer and then jabbed his ribs). He would like to be seen by any provider today to get an x-ray. Also, needs F/U from being in the hospital. Thanks    Janine Rice RN  [facility]

## 2023-05-26 ENCOUNTER — APPOINTMENT (OUTPATIENT)
Dept: GENERAL RADIOLOGY | Facility: CLINIC | Age: 68
End: 2023-05-26
Attending: EMERGENCY MEDICINE
Payer: COMMERCIAL

## 2023-05-26 ENCOUNTER — HOSPITAL ENCOUNTER (EMERGENCY)
Facility: CLINIC | Age: 68
Discharge: HOME | End: 2023-05-26
Attending: EMERGENCY MEDICINE
Payer: COMMERCIAL

## 2023-05-26 VITALS
BODY MASS INDEX: 38.57 KG/M2 | OXYGEN SATURATION: 94 % | WEIGHT: 240 LBS | HEART RATE: 72 BPM | TEMPERATURE: 98 F | DIASTOLIC BLOOD PRESSURE: 73 MMHG | SYSTOLIC BLOOD PRESSURE: 125 MMHG | HEIGHT: 66 IN | RESPIRATION RATE: 16 BRPM

## 2023-05-26 DIAGNOSIS — S22.32XA CLOSED FRACTURE OF ONE RIB OF LEFT SIDE, INITIAL ENCOUNTER: ICD-10-CM

## 2023-05-26 LAB
ATRIAL RATE - MUSE: 79 BPM
DIASTOLIC BLOOD PRESSURE - MUSE: NORMAL MMHG
INTERPRETATION ECG - MUSE: NORMAL
P AXIS - MUSE: 66 DEGREES
PR INTERVAL - MUSE: 214 MS
QRS DURATION - MUSE: 116 MS
QT - MUSE: 374 MS
QTC - MUSE: 428 MS
R AXIS - MUSE: -67 DEGREES
SYSTOLIC BLOOD PRESSURE - MUSE: NORMAL MMHG
T AXIS - MUSE: 4 DEGREES
VENTRICULAR RATE- MUSE: 79 BPM

## 2023-05-26 PROCEDURE — 71101 X-RAY EXAM UNILAT RIBS/CHEST: CPT | Mod: LT

## 2023-05-26 PROCEDURE — 250N000013 HC RX MED GY IP 250 OP 250 PS 637: Performed by: EMERGENCY MEDICINE

## 2023-05-26 RX ORDER — LIDOCAINE 4 G/G
1 PATCH TOPICAL EVERY 24 HOURS
Qty: 10 PATCH | Refills: 0 | Status: SHIPPED | OUTPATIENT
Start: 2023-05-26 | End: 2023-06-20

## 2023-05-26 RX ORDER — LIDOCAINE 4 G/G
1 PATCH TOPICAL ONCE
Status: DISCONTINUED | OUTPATIENT
Start: 2023-05-26 | End: 2023-05-26 | Stop reason: HOSPADM

## 2023-05-26 RX ADMIN — LIDOCAINE 1 PATCH: 560 PATCH PERCUTANEOUS; TOPICAL; TRANSDERMAL at 00:58

## 2023-05-26 ASSESSMENT — ACTIVITIES OF DAILY LIVING (ADL): ADLS_ACUITY_SCORE: 35

## 2023-05-26 NOTE — ED TRIAGE NOTES
Patient here with left rib injury. He stated he hit his rib on his washing machine this morning     Triage Assessment     Row Name 05/25/23 4016       Triage Assessment (Adult)    Airway WDL WDL       Respiratory WDL    Respiratory WDL WDL       Skin Circulation/Temperature WDL    Skin Circulation/Temperature WDL WDL       Cardiac WDL    Cardiac WDL WDL       Peripheral/Neurovascular WDL    Peripheral Neurovascular WDL WDL       Cognitive/Neuro/Behavioral WDL    Cognitive/Neuro/Behavioral WDL WDL

## 2023-05-30 ENCOUNTER — HOSPITAL ENCOUNTER (OUTPATIENT)
Facility: CLINIC | Age: 68
Setting detail: OBSERVATION
Discharge: NURSING, REHAB OR LONG-TERM CARE | End: 2023-05-31
Attending: EMERGENCY MEDICINE | Admitting: STUDENT IN AN ORGANIZED HEALTH CARE EDUCATION/TRAINING PROGRAM
Payer: COMMERCIAL

## 2023-05-30 ENCOUNTER — APPOINTMENT (OUTPATIENT)
Dept: CT IMAGING | Facility: CLINIC | Age: 68
End: 2023-05-30
Attending: EMERGENCY MEDICINE
Payer: COMMERCIAL

## 2023-05-30 DIAGNOSIS — R11.2 NAUSEA AND VOMITING, UNSPECIFIED VOMITING TYPE: ICD-10-CM

## 2023-05-30 DIAGNOSIS — M48.061 SPINAL STENOSIS AT L4-L5 LEVEL: Primary | ICD-10-CM

## 2023-05-30 DIAGNOSIS — R10.84 ABDOMINAL PAIN, GENERALIZED: ICD-10-CM

## 2023-05-30 PROBLEM — G89.18 ACUTE POSTOPERATIVE PAIN: Status: RESOLVED | Noted: 2020-08-06 | Resolved: 2023-05-30

## 2023-05-30 PROBLEM — R07.9 CHEST PAIN: Status: RESOLVED | Noted: 2018-04-01 | Resolved: 2023-05-30

## 2023-05-30 PROBLEM — R06.02 SHORTNESS OF BREATH: Status: RESOLVED | Noted: 2020-12-05 | Resolved: 2023-05-30

## 2023-05-30 PROBLEM — H52.7 DISORDER OF REFRACTION: Status: RESOLVED | Noted: 2022-11-28 | Resolved: 2023-05-30

## 2023-05-30 PROBLEM — M62.81 MUSCLE WEAKNESS (GENERALIZED): Status: RESOLVED | Noted: 2020-10-20 | Resolved: 2023-05-30

## 2023-05-30 PROBLEM — K59.03 DRUG INDUCED CONSTIPATION: Status: RESOLVED | Noted: 2022-11-28 | Resolved: 2023-05-30

## 2023-05-30 PROBLEM — L03.115 BILATERAL LOWER LEG CELLULITIS: Status: RESOLVED | Noted: 2023-05-11 | Resolved: 2023-05-30

## 2023-05-30 PROBLEM — L03.116 BILATERAL LOWER LEG CELLULITIS: Status: RESOLVED | Noted: 2023-05-11 | Resolved: 2023-05-30

## 2023-05-30 PROBLEM — L03.116 CELLULITIS OF LEFT LOWER EXTREMITY: Status: RESOLVED | Noted: 2023-05-18 | Resolved: 2023-05-30

## 2023-05-30 PROBLEM — E87.5 HYPERKALEMIA: Status: RESOLVED | Noted: 2020-12-03 | Resolved: 2023-05-30

## 2023-05-30 PROBLEM — D69.6 THROMBOCYTOPENIA, UNSPECIFIED (H): Status: ACTIVE | Noted: 2020-08-03

## 2023-05-30 LAB
ALBUMIN SERPL BCG-MCNC: 4.3 G/DL (ref 3.5–5.2)
ALP SERPL-CCNC: 137 U/L (ref 40–129)
ALT SERPL W P-5'-P-CCNC: 53 U/L (ref 10–50)
ANION GAP SERPL CALCULATED.3IONS-SCNC: 15 MMOL/L (ref 7–15)
AST SERPL W P-5'-P-CCNC: 54 U/L (ref 10–50)
BASOPHILS # BLD AUTO: 0.1 10E3/UL (ref 0–0.2)
BASOPHILS NFR BLD AUTO: 0 %
BILIRUB SERPL-MCNC: 1.5 MG/DL
BUN SERPL-MCNC: 12.7 MG/DL (ref 8–23)
CALCIUM SERPL-MCNC: 9.2 MG/DL (ref 8.8–10.2)
CHLORIDE SERPL-SCNC: 96 MMOL/L (ref 98–107)
CREAT SERPL-MCNC: 0.76 MG/DL (ref 0.67–1.17)
DEPRECATED HCO3 PLAS-SCNC: 24 MMOL/L (ref 22–29)
EOSINOPHIL # BLD AUTO: 0 10E3/UL (ref 0–0.7)
EOSINOPHIL NFR BLD AUTO: 0 %
ERYTHROCYTE [DISTWIDTH] IN BLOOD BY AUTOMATED COUNT: 13.5 % (ref 10–15)
GFR SERPL CREATININE-BSD FRML MDRD: >90 ML/MIN/1.73M2
GLUCOSE SERPL-MCNC: 244 MG/DL (ref 70–99)
HCT VFR BLD AUTO: 46 % (ref 40–53)
HGB BLD-MCNC: 16 G/DL (ref 13.3–17.7)
IMM GRANULOCYTES # BLD: 0.1 10E3/UL
IMM GRANULOCYTES NFR BLD: 1 %
LACTATE SERPL-SCNC: 2 MMOL/L (ref 0.7–2)
LIPASE SERPL-CCNC: 11 U/L (ref 13–60)
LYMPHOCYTES # BLD AUTO: 0.4 10E3/UL (ref 0.8–5.3)
LYMPHOCYTES NFR BLD AUTO: 3 %
MCH RBC QN AUTO: 32.5 PG (ref 26.5–33)
MCHC RBC AUTO-ENTMCNC: 34.8 G/DL (ref 31.5–36.5)
MCV RBC AUTO: 93 FL (ref 78–100)
MONOCYTES # BLD AUTO: 0.5 10E3/UL (ref 0–1.3)
MONOCYTES NFR BLD AUTO: 4 %
NEUTROPHILS # BLD AUTO: 10.9 10E3/UL (ref 1.6–8.3)
NEUTROPHILS NFR BLD AUTO: 92 %
NRBC # BLD AUTO: 0 10E3/UL
NRBC BLD AUTO-RTO: 0 /100
PLATELET # BLD AUTO: 163 10E3/UL (ref 150–450)
POTASSIUM SERPL-SCNC: 4.7 MMOL/L (ref 3.4–5.3)
PROT SERPL-MCNC: 7.2 G/DL (ref 6.4–8.3)
RBC # BLD AUTO: 4.93 10E6/UL (ref 4.4–5.9)
SODIUM SERPL-SCNC: 135 MMOL/L (ref 136–145)
WBC # BLD AUTO: 11.9 10E3/UL (ref 4–11)

## 2023-05-30 PROCEDURE — 99285 EMERGENCY DEPT VISIT HI MDM: CPT | Mod: 25

## 2023-05-30 PROCEDURE — 80053 COMPREHEN METABOLIC PANEL: CPT | Performed by: EMERGENCY MEDICINE

## 2023-05-30 PROCEDURE — 36415 COLL VENOUS BLD VENIPUNCTURE: CPT | Performed by: EMERGENCY MEDICINE

## 2023-05-30 PROCEDURE — 250N000011 HC RX IP 250 OP 636: Performed by: EMERGENCY MEDICINE

## 2023-05-30 PROCEDURE — 74177 CT ABD & PELVIS W/CONTRAST: CPT

## 2023-05-30 PROCEDURE — 96376 TX/PRO/DX INJ SAME DRUG ADON: CPT

## 2023-05-30 PROCEDURE — G0378 HOSPITAL OBSERVATION PER HR: HCPCS

## 2023-05-30 PROCEDURE — 99222 1ST HOSP IP/OBS MODERATE 55: CPT | Performed by: STUDENT IN AN ORGANIZED HEALTH CARE EDUCATION/TRAINING PROGRAM

## 2023-05-30 PROCEDURE — 85014 HEMATOCRIT: CPT | Performed by: EMERGENCY MEDICINE

## 2023-05-30 PROCEDURE — 96375 TX/PRO/DX INJ NEW DRUG ADDON: CPT

## 2023-05-30 PROCEDURE — 250N000009 HC RX 250: Performed by: EMERGENCY MEDICINE

## 2023-05-30 PROCEDURE — 83690 ASSAY OF LIPASE: CPT | Performed by: EMERGENCY MEDICINE

## 2023-05-30 PROCEDURE — 96374 THER/PROPH/DIAG INJ IV PUSH: CPT | Mod: 59

## 2023-05-30 PROCEDURE — 83605 ASSAY OF LACTIC ACID: CPT | Performed by: EMERGENCY MEDICINE

## 2023-05-30 RX ORDER — SUMATRIPTAN 50 MG/1
50 TABLET, FILM COATED ORAL
Status: DISCONTINUED | OUTPATIENT
Start: 2023-05-30 | End: 2023-05-31 | Stop reason: HOSPADM

## 2023-05-30 RX ORDER — DULOXETIN HYDROCHLORIDE 30 MG/1
30 CAPSULE, DELAYED RELEASE ORAL 2 TIMES DAILY
Status: DISCONTINUED | OUTPATIENT
Start: 2023-05-31 | End: 2023-05-31 | Stop reason: HOSPADM

## 2023-05-30 RX ORDER — HYDRALAZINE HYDROCHLORIDE 20 MG/ML
10 INJECTION INTRAMUSCULAR; INTRAVENOUS EVERY 6 HOURS PRN
Status: DISCONTINUED | OUTPATIENT
Start: 2023-05-30 | End: 2023-05-31 | Stop reason: HOSPADM

## 2023-05-30 RX ORDER — ONDANSETRON 2 MG/ML
4 INJECTION INTRAMUSCULAR; INTRAVENOUS ONCE
Status: COMPLETED | OUTPATIENT
Start: 2023-05-30 | End: 2023-05-30

## 2023-05-30 RX ORDER — PREGABALIN 100 MG/1
100 CAPSULE ORAL 3 TIMES DAILY
Status: DISCONTINUED | OUTPATIENT
Start: 2023-05-31 | End: 2023-05-31 | Stop reason: HOSPADM

## 2023-05-30 RX ORDER — DOXYCYCLINE 100 MG/1
100 CAPSULE ORAL 2 TIMES DAILY
Status: DISCONTINUED | OUTPATIENT
Start: 2023-05-31 | End: 2023-05-31 | Stop reason: HOSPADM

## 2023-05-30 RX ORDER — ONDANSETRON 4 MG/1
4 TABLET, ORALLY DISINTEGRATING ORAL EVERY 6 HOURS PRN
Status: DISCONTINUED | OUTPATIENT
Start: 2023-05-30 | End: 2023-05-31 | Stop reason: HOSPADM

## 2023-05-30 RX ORDER — BUPROPION HYDROCHLORIDE 150 MG/1
150 TABLET ORAL EVERY MORNING
Status: DISCONTINUED | OUTPATIENT
Start: 2023-05-31 | End: 2023-05-31 | Stop reason: HOSPADM

## 2023-05-30 RX ORDER — NALOXONE HYDROCHLORIDE 0.4 MG/ML
0.2 INJECTION, SOLUTION INTRAMUSCULAR; INTRAVENOUS; SUBCUTANEOUS
Status: DISCONTINUED | OUTPATIENT
Start: 2023-05-30 | End: 2023-05-31 | Stop reason: HOSPADM

## 2023-05-30 RX ORDER — NICOTINE POLACRILEX 4 MG
15-30 LOZENGE BUCCAL
Status: DISCONTINUED | OUTPATIENT
Start: 2023-05-30 | End: 2023-05-31 | Stop reason: HOSPADM

## 2023-05-30 RX ORDER — HYDROMORPHONE HYDROCHLORIDE 1 MG/ML
0.5 INJECTION, SOLUTION INTRAMUSCULAR; INTRAVENOUS; SUBCUTANEOUS ONCE
Status: COMPLETED | OUTPATIENT
Start: 2023-05-30 | End: 2023-05-30

## 2023-05-30 RX ORDER — METOPROLOL SUCCINATE 50 MG/1
50 TABLET, EXTENDED RELEASE ORAL DAILY
Status: DISCONTINUED | OUTPATIENT
Start: 2023-05-31 | End: 2023-05-31 | Stop reason: HOSPADM

## 2023-05-30 RX ORDER — LIDOCAINE 40 MG/G
CREAM TOPICAL
Status: DISCONTINUED | OUTPATIENT
Start: 2023-05-30 | End: 2023-05-31 | Stop reason: HOSPADM

## 2023-05-30 RX ORDER — ONDANSETRON 2 MG/ML
4 INJECTION INTRAMUSCULAR; INTRAVENOUS EVERY 6 HOURS PRN
Status: DISCONTINUED | OUTPATIENT
Start: 2023-05-30 | End: 2023-05-31 | Stop reason: HOSPADM

## 2023-05-30 RX ORDER — IOPAMIDOL 755 MG/ML
121 INJECTION, SOLUTION INTRAVASCULAR ONCE
Status: COMPLETED | OUTPATIENT
Start: 2023-05-30 | End: 2023-05-30

## 2023-05-30 RX ORDER — LOSARTAN POTASSIUM 100 MG/1
100 TABLET ORAL DAILY
Status: DISCONTINUED | OUTPATIENT
Start: 2023-05-31 | End: 2023-05-31 | Stop reason: HOSPADM

## 2023-05-30 RX ORDER — NALOXONE HYDROCHLORIDE 0.4 MG/ML
0.4 INJECTION, SOLUTION INTRAMUSCULAR; INTRAVENOUS; SUBCUTANEOUS
Status: DISCONTINUED | OUTPATIENT
Start: 2023-05-30 | End: 2023-05-31 | Stop reason: HOSPADM

## 2023-05-30 RX ORDER — PROCHLORPERAZINE 25 MG
12.5 SUPPOSITORY, RECTAL RECTAL EVERY 12 HOURS PRN
Status: DISCONTINUED | OUTPATIENT
Start: 2023-05-30 | End: 2023-05-31 | Stop reason: HOSPADM

## 2023-05-30 RX ORDER — DEXTROSE MONOHYDRATE 25 G/50ML
25-50 INJECTION, SOLUTION INTRAVENOUS
Status: DISCONTINUED | OUTPATIENT
Start: 2023-05-30 | End: 2023-05-31 | Stop reason: HOSPADM

## 2023-05-30 RX ORDER — OXYCODONE AND ACETAMINOPHEN 5; 325 MG/1; MG/1
1 TABLET ORAL EVERY 6 HOURS PRN
Status: DISCONTINUED | OUTPATIENT
Start: 2023-05-30 | End: 2023-05-31 | Stop reason: HOSPADM

## 2023-05-30 RX ORDER — PROCHLORPERAZINE MALEATE 5 MG
5 TABLET ORAL EVERY 6 HOURS PRN
Status: DISCONTINUED | OUTPATIENT
Start: 2023-05-30 | End: 2023-05-31 | Stop reason: HOSPADM

## 2023-05-30 RX ORDER — PREGABALIN 100 MG/1
100 CAPSULE ORAL DAILY PRN
COMMUNITY
End: 2023-06-08

## 2023-05-30 RX ORDER — ASPIRIN 81 MG/1
81 TABLET ORAL DAILY
Status: DISCONTINUED | OUTPATIENT
Start: 2023-05-31 | End: 2023-05-31 | Stop reason: HOSPADM

## 2023-05-30 RX ORDER — TAMSULOSIN HYDROCHLORIDE 0.4 MG/1
0.4 CAPSULE ORAL 2 TIMES DAILY
Status: DISCONTINUED | OUTPATIENT
Start: 2023-05-31 | End: 2023-05-31 | Stop reason: HOSPADM

## 2023-05-30 RX ORDER — SODIUM CHLORIDE, SODIUM LACTATE, POTASSIUM CHLORIDE, CALCIUM CHLORIDE 600; 310; 30; 20 MG/100ML; MG/100ML; MG/100ML; MG/100ML
INJECTION, SOLUTION INTRAVENOUS CONTINUOUS
Status: DISCONTINUED | OUTPATIENT
Start: 2023-05-31 | End: 2023-05-31 | Stop reason: HOSPADM

## 2023-05-30 RX ADMIN — HYDROMORPHONE HYDROCHLORIDE 0.5 MG: 1 INJECTION, SOLUTION INTRAMUSCULAR; INTRAVENOUS; SUBCUTANEOUS at 20:53

## 2023-05-30 RX ADMIN — SODIUM CHLORIDE 75 ML: 9 INJECTION, SOLUTION INTRAVENOUS at 17:14

## 2023-05-30 RX ADMIN — HYDROMORPHONE HYDROCHLORIDE 0.5 MG: 1 INJECTION, SOLUTION INTRAMUSCULAR; INTRAVENOUS; SUBCUTANEOUS at 22:42

## 2023-05-30 RX ADMIN — ONDANSETRON 4 MG: 2 INJECTION INTRAMUSCULAR; INTRAVENOUS at 17:59

## 2023-05-30 RX ADMIN — HYDROMORPHONE HYDROCHLORIDE 0.5 MG: 1 INJECTION, SOLUTION INTRAMUSCULAR; INTRAVENOUS; SUBCUTANEOUS at 19:48

## 2023-05-30 RX ADMIN — ONDANSETRON 4 MG: 2 INJECTION INTRAMUSCULAR; INTRAVENOUS at 19:48

## 2023-05-30 RX ADMIN — IOPAMIDOL 121 ML: 755 INJECTION, SOLUTION INTRAVENOUS at 17:13

## 2023-05-30 RX ADMIN — HYDROMORPHONE HYDROCHLORIDE 1 MG: 1 INJECTION, SOLUTION INTRAMUSCULAR; INTRAVENOUS; SUBCUTANEOUS at 18:01

## 2023-05-30 ASSESSMENT — ACTIVITIES OF DAILY LIVING (ADL)
ADLS_ACUITY_SCORE: 35

## 2023-05-30 NOTE — ED PROVIDER NOTES
History     Chief Complaint:  Nausea and Diarrhea       The history is provided by the patient.      Parmjit Hernández is a 67 year old male with a history of Type 2 diabetes, fibromyalgia, lymphedema, hypertension, and hyperlipidemia that presents with nausea abdominal pain. This morning around 0900 the patient began to experience sudden generalized abdominal pain, nausea, back pain, and one episode of vomiting while in the ED. Has had one bowel movement today. He mentions that he was  in a week ago for a broken rib, and prior to that cellulitis. Had cholecystectomy, everything else still present. Denies use of tobacco or alcohol. No history of bowel obstruction. No chest pain, shortness of breath, fever, constipation, abdominal distention, changes in urination.    Independent Historian:   None - Patient Only    Review of External Notes:   none    Medications:    Aspirin 81 mg  Atorvastatin  Bupropion  Cariprazine  Cefadroxil  Doxycycline hyclate  Dulaglutide  Duloxetine  Hydrochlorothiazide  Insulin glargine  Loperamide  Losartan  Metformin  Metoprolol succinate ER  Metronidazole  Modafinil  Nitroglycerin  Oxycodone-acetaminophen  Pregabalin  Senna-docusate  Sumatriptan  Tamsulosin    Past Medical History:    Acquired lymphedema              Allergic state     Amyloidosis       Bipolar I disorder  Degenerative disc disease, lumbar      Depressive disorder      EKG abnormality           H/O bone marrow transplant                Diverticulitis       Hyperlipidemia  Hypertension                 Marianne    Morbid obesity   Narcotic dependence  NSTEMI  Obsessive compulsive disorder  Other acquired absence of organ         Other cirrhosis of liver, possibly from vences      Other specified viral warts        Peripheral edema         Polyneuropathy associated with underlying disease   Restless legs syndrome           Stasis dermatitis of both legs    Type 2 diabetes mellitus          Past Surgical History:    Abdominal  hernia  Back surgery  Biopsy  Bone marrow biopsy  Cholecystectomy  Colonoscopy  EGD, combined   Umbilical hernia repair  Repair deviated septum        Physical Exam     Patient Vitals for the past 24 hrs:   BP Temp Temp src Pulse Resp SpO2   05/30/23 2107 (!) 173/80 -- -- -- 20 98 %   05/30/23 1948 -- -- -- -- 18 --   05/30/23 1442 (!) 171/104 97.6  F (36.4  C) Oral 98 18 99 %      Physical Exam  SKIN:  Warm, dry.  No jaundice.  HEMATOLOGIC/IMMUNOLOGIC/LYMPHATIC:  No pallor.  EYES:  Conjunctivae normal.  Anicteric.  CARDIOVASCULAR:  Regular rate and rhythm.  No murmur.  RESPIRATORY:  No respiratory distress, breath sounds equal and normal.  GASTROINTESTINAL:  Soft diffusely tender abdomen to both percussion and shallow palpation with active bowel sounds.  MUSCULOSKELETAL: Overweight body habitus.  NEUROLOGIC:  Alert, conversant.  PSYCHIATRIC:  Normal mood.his .    Emergency Department Course     Imaging:  CT Abdomen Pelvis w Contrast   Final Result   IMPRESSION:    1.  No acute findings or CT evidence for source of symptoms.   2.  Hepatic steatosis with findings suggestive of cirrhosis and/or early fibrosis.         Report per radiology    Laboratory:  Labs Ordered and Resulted from Time of ED Arrival to Time of ED Departure   COMPREHENSIVE METABOLIC PANEL - Abnormal       Result Value    Sodium 135 (*)     Potassium 4.7      Chloride 96 (*)     Carbon Dioxide (CO2) 24      Anion Gap 15      Urea Nitrogen 12.7      Creatinine 0.76      Calcium 9.2      Glucose 244 (*)     Alkaline Phosphatase 137 (*)     AST 54 (*)     ALT 53 (*)     Protein Total 7.2      Albumin 4.3      Bilirubin Total 1.5 (*)     GFR Estimate >90     LIPASE - Abnormal    Lipase 11 (*)    CBC WITH PLATELETS AND DIFFERENTIAL - Abnormal    WBC Count 11.9 (*)     RBC Count 4.93      Hemoglobin 16.0      Hematocrit 46.0      MCV 93      MCH 32.5      MCHC 34.8      RDW 13.5      Platelet Count 163      % Neutrophils 92      % Lymphocytes 3      %  Monocytes 4      % Eosinophils 0      % Basophils 0      % Immature Granulocytes 1      NRBCs per 100 WBC 0      Absolute Neutrophils 10.9 (*)     Absolute Lymphocytes 0.4 (*)     Absolute Monocytes 0.5      Absolute Eosinophils 0.0      Absolute Basophils 0.1      Absolute Immature Granulocytes 0.1      Absolute NRBCs 0.0     LACTIC ACID WHOLE BLOOD - Normal    Lactic Acid 2.0        Emergency Department Course & Assessments:       Interventions:  Medications   iopamidol (ISOVUE-370) solution 121 mL (121 mLs Intravenous $Given 5/30/23 1713)   Saline Flush (75 mLs Intravenous $Given 5/30/23 1714)   HYDROmorphone (DILAUDID) injection 1 mg (1 mg Intravenous $Given 5/30/23 1801)   ondansetron (ZOFRAN) injection 4 mg (4 mg Intravenous $Given 5/30/23 1759)   HYDROmorphone (PF) (DILAUDID) injection 0.5 mg (0.5 mg Intravenous $Given 5/30/23 1948)   ondansetron (ZOFRAN) injection 4 mg (4 mg Intravenous $Given 5/30/23 1948)   HYDROmorphone (PF) (DILAUDID) injection 0.5 mg (0.5 mg Intravenous $Given 5/30/23 2053)      Assessments:  1729 I consulted with the patient and obtained history as shown above  2028 I rechecked the patient and explained exam results     Independent Interpretation (X-rays, CTs, rhythm strip):  None    Consultations/Discussion of Management or Tests:  2022 I consulted with Dr. Martinez about the patient and plan of care     Social Determinants of Health affecting care:   None    Disposition:  The patient was admitted to the hospital under the care of Dr. Martinez.     Impression & Plan      Medical Decision Making:  This patient presents with concern of intense abdominal pain.  Given his age and this symptom I was concerned for a host of etiologies.  Gallbladder is surgically absent.  Considered bowel obstruction, perforated viscus, appendicitis, diverticulitis, pancreatitis, choledocholithiasis and vascular disease.  Evaluation was overall relatively unrevealing.  He was requiring repetitive doses of Dilaudid  to provide marginal pain relief.  He did not feel well enough to be discharged home.  Unclear what is causing the patient's symptoms but at least the evaluation is reassuring.  He will be admitted for further care and testing as needed.    Diagnosis:    ICD-10-CM    1. Abdominal pain, generalized  R10.84       2. Nausea and vomiting, unspecified vomiting type  R11.2            Discharge Medications:  New Prescriptions    No medications on file      Scribe Disclosure:  I, Ye Tristan, am serving as a scribe at 5:50 PM on 5/30/2023 to document services personally performed by Jesse Gilliland MD based on my observations and the provider's statements to me.     5/30/2023   Jesse Gilliland MD Moe, James Thomas, MD  05/30/23 2110

## 2023-05-30 NOTE — ED TRIAGE NOTES
Pt presents with nausea, diarrhea, and generalized abd pain that started this morning. Pt denies vomiting.

## 2023-05-30 NOTE — ED TRIAGE NOTES
Pt presents to the ED via [facility] to Triage lobby for evaluation of nausea, diarrhea. Per EMS, 24 hours of nausea and diarrhea. EMS administered IM Zofran and Intranasal Fent for pain. Per EMS, recent rib fracs. Pre hospital blood sugar: 245

## 2023-05-31 VITALS
WEIGHT: 231.48 LBS | OXYGEN SATURATION: 91 % | SYSTOLIC BLOOD PRESSURE: 108 MMHG | BODY MASS INDEX: 37.2 KG/M2 | HEART RATE: 69 BPM | DIASTOLIC BLOOD PRESSURE: 60 MMHG | TEMPERATURE: 98.4 F | RESPIRATION RATE: 16 BRPM

## 2023-05-31 LAB
ALBUMIN SERPL BCG-MCNC: 3.7 G/DL (ref 3.5–5.2)
ALP SERPL-CCNC: 105 U/L (ref 40–129)
ALT SERPL W P-5'-P-CCNC: 37 U/L (ref 10–50)
ANION GAP SERPL CALCULATED.3IONS-SCNC: 10 MMOL/L (ref 7–15)
AST SERPL W P-5'-P-CCNC: 29 U/L (ref 10–50)
BILIRUB SERPL-MCNC: 0.7 MG/DL
BUN SERPL-MCNC: 18.8 MG/DL (ref 8–23)
CALCIUM SERPL-MCNC: 8.6 MG/DL (ref 8.8–10.2)
CHLORIDE SERPL-SCNC: 97 MMOL/L (ref 98–107)
CREAT SERPL-MCNC: 0.91 MG/DL (ref 0.67–1.17)
DEPRECATED HCO3 PLAS-SCNC: 26 MMOL/L (ref 22–29)
ERYTHROCYTE [DISTWIDTH] IN BLOOD BY AUTOMATED COUNT: 13.7 % (ref 10–15)
GFR SERPL CREATININE-BSD FRML MDRD: >90 ML/MIN/1.73M2
GLUCOSE BLDC GLUCOMTR-MCNC: 142 MG/DL (ref 70–99)
GLUCOSE BLDC GLUCOMTR-MCNC: 207 MG/DL (ref 70–99)
GLUCOSE SERPL-MCNC: 230 MG/DL (ref 70–99)
HCT VFR BLD AUTO: 39.9 % (ref 40–53)
HGB BLD-MCNC: 13.9 G/DL (ref 13.3–17.7)
MCH RBC QN AUTO: 32.9 PG (ref 26.5–33)
MCHC RBC AUTO-ENTMCNC: 34.8 G/DL (ref 31.5–36.5)
MCV RBC AUTO: 94 FL (ref 78–100)
PLATELET # BLD AUTO: 158 10E3/UL (ref 150–450)
POTASSIUM SERPL-SCNC: 4.2 MMOL/L (ref 3.4–5.3)
PROT SERPL-MCNC: 6.2 G/DL (ref 6.4–8.3)
RBC # BLD AUTO: 4.23 10E6/UL (ref 4.4–5.9)
SODIUM SERPL-SCNC: 133 MMOL/L (ref 136–145)
WBC # BLD AUTO: 8.6 10E3/UL (ref 4–11)

## 2023-05-31 PROCEDURE — 250N000011 HC RX IP 250 OP 636: Performed by: HOSPITALIST

## 2023-05-31 PROCEDURE — 36415 COLL VENOUS BLD VENIPUNCTURE: CPT | Performed by: STUDENT IN AN ORGANIZED HEALTH CARE EDUCATION/TRAINING PROGRAM

## 2023-05-31 PROCEDURE — 80053 COMPREHEN METABOLIC PANEL: CPT | Performed by: STUDENT IN AN ORGANIZED HEALTH CARE EDUCATION/TRAINING PROGRAM

## 2023-05-31 PROCEDURE — 85027 COMPLETE CBC AUTOMATED: CPT | Performed by: STUDENT IN AN ORGANIZED HEALTH CARE EDUCATION/TRAINING PROGRAM

## 2023-05-31 PROCEDURE — 250N000011 HC RX IP 250 OP 636: Performed by: STUDENT IN AN ORGANIZED HEALTH CARE EDUCATION/TRAINING PROGRAM

## 2023-05-31 PROCEDURE — G0378 HOSPITAL OBSERVATION PER HR: HCPCS

## 2023-05-31 PROCEDURE — 250N000013 HC RX MED GY IP 250 OP 250 PS 637: Performed by: STUDENT IN AN ORGANIZED HEALTH CARE EDUCATION/TRAINING PROGRAM

## 2023-05-31 PROCEDURE — 96376 TX/PRO/DX INJ SAME DRUG ADON: CPT

## 2023-05-31 PROCEDURE — 99239 HOSP IP/OBS DSCHRG MGMT >30: CPT | Performed by: STUDENT IN AN ORGANIZED HEALTH CARE EDUCATION/TRAINING PROGRAM

## 2023-05-31 PROCEDURE — 258N000003 HC RX IP 258 OP 636: Performed by: STUDENT IN AN ORGANIZED HEALTH CARE EDUCATION/TRAINING PROGRAM

## 2023-05-31 PROCEDURE — 82962 GLUCOSE BLOOD TEST: CPT

## 2023-05-31 PROCEDURE — 96375 TX/PRO/DX INJ NEW DRUG ADDON: CPT

## 2023-05-31 RX ORDER — CYCLOBENZAPRINE HCL 10 MG
10 TABLET ORAL 3 TIMES DAILY
Status: DISCONTINUED | OUTPATIENT
Start: 2023-05-31 | End: 2023-05-31 | Stop reason: HOSPADM

## 2023-05-31 RX ORDER — LABETALOL HYDROCHLORIDE 5 MG/ML
10 INJECTION, SOLUTION INTRAVENOUS EVERY 4 HOURS PRN
Status: DISCONTINUED | OUTPATIENT
Start: 2023-05-31 | End: 2023-05-31 | Stop reason: HOSPADM

## 2023-05-31 RX ORDER — CYCLOBENZAPRINE HCL 10 MG
10 TABLET ORAL 3 TIMES DAILY PRN
Qty: 21 TABLET | Refills: 0 | Status: SHIPPED | OUTPATIENT
Start: 2023-05-31 | End: 2023-07-13

## 2023-05-31 RX ORDER — KETOROLAC TROMETHAMINE 15 MG/ML
15 INJECTION, SOLUTION INTRAMUSCULAR; INTRAVENOUS ONCE
Status: COMPLETED | OUTPATIENT
Start: 2023-05-31 | End: 2023-05-31

## 2023-05-31 RX ADMIN — TAMSULOSIN HYDROCHLORIDE 0.4 MG: 0.4 CAPSULE ORAL at 08:48

## 2023-05-31 RX ADMIN — DULOXETINE HYDROCHLORIDE 30 MG: 30 CAPSULE, DELAYED RELEASE ORAL at 08:48

## 2023-05-31 RX ADMIN — DOXYCYCLINE HYCLATE 100 MG: 100 CAPSULE ORAL at 00:35

## 2023-05-31 RX ADMIN — METOPROLOL SUCCINATE 50 MG: 50 TABLET, EXTENDED RELEASE ORAL at 08:47

## 2023-05-31 RX ADMIN — SODIUM CHLORIDE, POTASSIUM CHLORIDE, SODIUM LACTATE AND CALCIUM CHLORIDE: 600; 310; 30; 20 INJECTION, SOLUTION INTRAVENOUS at 09:02

## 2023-05-31 RX ADMIN — PREGABALIN 100 MG: 100 CAPSULE ORAL at 08:47

## 2023-05-31 RX ADMIN — ASPIRIN 81 MG: 81 TABLET, COATED ORAL at 08:47

## 2023-05-31 RX ADMIN — OXYCODONE HYDROCHLORIDE AND ACETAMINOPHEN 1 TABLET: 5; 325 TABLET ORAL at 16:24

## 2023-05-31 RX ADMIN — CYCLOBENZAPRINE 10 MG: 10 TABLET, FILM COATED ORAL at 10:09

## 2023-05-31 RX ADMIN — SODIUM CHLORIDE, POTASSIUM CHLORIDE, SODIUM LACTATE AND CALCIUM CHLORIDE: 600; 310; 30; 20 INJECTION, SOLUTION INTRAVENOUS at 00:47

## 2023-05-31 RX ADMIN — METOPROLOL SUCCINATE 50 MG: 50 TABLET, EXTENDED RELEASE ORAL at 00:27

## 2023-05-31 RX ADMIN — BUPROPION HYDROCHLORIDE 150 MG: 150 TABLET, FILM COATED, EXTENDED RELEASE ORAL at 08:48

## 2023-05-31 RX ADMIN — KETOROLAC TROMETHAMINE 15 MG: 15 INJECTION, SOLUTION INTRAMUSCULAR; INTRAVENOUS at 03:42

## 2023-05-31 RX ADMIN — CYCLOBENZAPRINE 10 MG: 10 TABLET, FILM COATED ORAL at 16:02

## 2023-05-31 RX ADMIN — PROCHLORPERAZINE EDISYLATE 5 MG: 5 INJECTION INTRAMUSCULAR; INTRAVENOUS at 04:03

## 2023-05-31 RX ADMIN — PREGABALIN 100 MG: 100 CAPSULE ORAL at 16:02

## 2023-05-31 RX ADMIN — DOXYCYCLINE HYCLATE 100 MG: 100 CAPSULE ORAL at 08:47

## 2023-05-31 RX ADMIN — OXYCODONE HYDROCHLORIDE AND ACETAMINOPHEN 1 TABLET: 5; 325 TABLET ORAL at 00:27

## 2023-05-31 RX ADMIN — OXYCODONE HYDROCHLORIDE AND ACETAMINOPHEN 1 TABLET: 5; 325 TABLET ORAL at 08:48

## 2023-05-31 RX ADMIN — LOSARTAN POTASSIUM 100 MG: 100 TABLET, FILM COATED ORAL at 08:48

## 2023-05-31 RX ADMIN — ONDANSETRON 4 MG: 2 INJECTION INTRAMUSCULAR; INTRAVENOUS at 00:42

## 2023-05-31 RX ADMIN — ATORVASTATIN CALCIUM 30 MG: 20 TABLET, FILM COATED ORAL at 08:47

## 2023-05-31 ASSESSMENT — ACTIVITIES OF DAILY LIVING (ADL)
ADLS_ACUITY_SCORE: 35
ADLS_ACUITY_SCORE: 35
ADLS_ACUITY_SCORE: 37
ADLS_ACUITY_SCORE: 37
ADLS_ACUITY_SCORE: 35
ADLS_ACUITY_SCORE: 37
ADLS_ACUITY_SCORE: 37

## 2023-05-31 NOTE — DISCHARGE SUMMARY
"[facility]  Hospitalist Discharge Summary      Date of Admission:  5/30/2023  Date of Discharge:  5/31/2023  Discharging Provider: Jaren Martinez MD  Discharge Service: Hospitalist Service    Discharge Diagnoses       Abdominal pain, generalized    Nausea and vomiting, unspecified vomiting type    Leukocytosis    Clinically Significant Risk Factors     # DMII: A1C = 8.8 % (Ref range: <5.7 %) within past 6 months  # Obesity: Estimated body mass index is 37.2 kg/m  as calculated from the following:    Height as of 5/25/23: 1.68 m (5' 6.14\").    Weight as of this encounter: 105 kg (231 lb 7.7 oz).       Follow-ups Needed After Discharge   Follow-up Appointments     Follow-up and recommended labs and tests       Follow up with primary care provider, Sherwin Mcdermott, within 7   days for hospital follow- up.  No follow up labs or test are needed.             Unresulted Labs Ordered in the Past 30 Days of this Admission     No orders found for last 31 day(s).        Discharge Disposition   Discharged to home  Condition at discharge: Stable    Hospital Course      Parmjit Hernández is a 67 year old male admitted on 5/30/2023. He presents with nausea/vomiting and abdominal pain.      Abdominal pain, generalized    Nausea and vomiting, unspecified vomiting type    Leukocytosis    Assessment: Presents with uncontrolled generalized abdominal pain.  On admission CT abdomen/pelvis was obtained on admission that showed no acute findings or CT evidence for source of symptoms. Hepatic steatosis with findings suggestive of cirrhosis and/or early fibrosis.  Patient's labs are otherwise without significant derangement aside from mild elevation in his LFTs.    Plan:   - Symptoms improved with supportive cares      Hyperlipidemia    Hypertension goal BP (blood pressure) < 140/90    Peripheral vascular disease (H)    Assessment/Plan: Continue prior to admission Lipitor/aspirin/losartan/ HCTZ      Type 2 diabetes " mellitus (H)    Polyneuropathy, unspecified    Assessment: PTA on Trulicity/Lantus 10 units at bedtime/metformin    Plan:   -Continue prior to admission Lantus  -Resume prior to mission Trulicity and metformin      Bipolar II disorder (H)    Other specified anxiety disorders    Insomnia due to medical condition    Assessment/Plan: Continue prior to admission Lyrica/Cymbalta.  Continue prior to admission modafinil at discharge.      Light chain (AL) amyloidosis (H)    Assessment/Plan: Follow as an outpatient      Obstructive sleep apnea syndrome    Assessment/Plan: Continue prior to mission CPAP      Thrombocytopenia, unspecified (H)    Acquired pancytopenia (H)    Assessment/Plan: Hemoglobin/platelet stable admission at 16 and 163 respectively.      Other cirrhosis of liver (H)    Assessment/Plan: Mildly elevated LFTs on admission.  We will monitor and recheck in a.m.        Continuous opioid dependence (H)    Chronic pain    Assessment/Plan: Has pain pump in place, treat pain as needed and limit narcotics if possible.      Lower extremity cellulitis.     Assessment/plan: Continue prior to admission ciprofloxacin      Consultations This Hospital Stay   None    Code Status   Full Code    Time Spent on this Encounter   I, Jaren Martinez MD, personally saw the patient today and spent greater than 30 minutes discharging this patient.       Jaren Martinez MD  [facility]  [address] [facility] [address]  Phone: [phone]  Fax: [phone]  ______________________________________________________________________    Physical Exam   Vital Signs: Temp: 98.5  F (36.9  C) Temp src: Oral BP: 123/75 Pulse: 71   Resp: 16 SpO2: 98 % O2 Device: None (Room air)    Weight: 231 lbs 7.73 oz  ----------------------------------------------------------------------------------------       Primary Care Physician   Sherwin Mcdermott    Discharge Orders      Reason for your hospital stay    You  had nausea vomiting and abdominal pain     Follow-up and recommended labs and tests     Follow up with primary care provider, Sherwin Mcdermott, within 7 days for hospital follow- up.  No follow up labs or test are needed.     Activity    Your activity upon discharge: activity as tolerated     Diet    Follow this diet upon discharge: Orders Placed This Encounter      Regular Diet Adult       Significant Results and Procedures   Most Recent 3 CBC's:  Recent Labs   Lab Test 05/31/23  1124 05/30/23  1510 05/21/23  0704 05/19/23  0949   WBC 8.6 11.9*  --  5.4   HGB 13.9 16.0  --  13.6   MCV 94 93  --  96    163 183 148*     Most Recent 3 BMP's:  Recent Labs   Lab Test 05/31/23  1124 05/31/23  0821 05/31/23  0024 05/30/23  1510 05/21/23  1130 05/21/23  0704 05/19/23  1226 05/19/23  0949   *  --   --  135*  --   --   --  138   POTASSIUM 4.2  --   --  4.7  --  4.1   < > 4.9   CHLORIDE 97*  --   --  96*  --   --   --  102   CO2 26  --   --  24  --   --   --  26   BUN 18.8  --   --  12.7  --   --   --  16.3   CR 0.91  --   --  0.76  --  1.06  --  0.79   ANIONGAP 10  --   --  15  --   --   --  10   LUIS 8.6*  --   --  9.2  --   --   --  9.0   * 142* 207* 244*   < > 170*   < > 224*    < > = values in this interval not displayed.     Most Recent 2 LFT's:  Recent Labs   Lab Test 05/31/23  1124 05/30/23  1510   AST 29 54*   ALT 37 53*   ALKPHOS 105 137*   BILITOTAL 0.7 1.5*     Most Recent 3 INR's:  Recent Labs   Lab Test 04/17/20 2009 01/27/18  1556 02/19/17  0734   INR 1.18* 1.06 1.08     7-Day Micro Results     No results found for the last 168 hours.        Most Recent Urinalysis:  Recent Labs   Lab Test 05/18/23  1412 05/10/19  1640 12/21/18  1134   COLOR Yellow   < > Yellow   APPEARANCE Clear   < > Clear   URINEGLC 300*   < > 100*   URINEBILI Negative   < > Negative   URINEKETONE 10*   < > Negative   SG 1.026   < > 1.010   UBLD Negative   < > Negative   URINEPH 6.0   < > 5.5   PROTEIN 20*   < >  Negative   UROBILINOGEN  --   --  0.2   NITRITE Negative   < > Negative   LEUKEST Negative   < > Negative   RBCU <1   < > O - 2   WBCU <1   < > 0 - 5    < > = values in this interval not displayed.   ,   Results for orders placed or performed during the hospital encounter of 05/30/23   CT Abdomen Pelvis w Contrast    Narrative    EXAM: CT ABDOMEN PELVIS W CONTRAST  LOCATION: [facility]  DATE/TIME: 5/30/2023 5:24 PM CDT    INDICATION: Diffuse abdominal pain, sudden onset about 6 hours ago, history of  cholecystectomy, nauseated  COMPARISON: 09/15/2018  TECHNIQUE: CT scan of the abdomen and pelvis was performed following injection of IV contrast. Multiplanar reformats were obtained. Dose reduction techniques were used.  CONTRAST:  121 mL isovue 370    FINDINGS:   LOWER CHEST: Patchy bibasilar atelectasis and scarring. Minimal bronchiectasis. Stable benign right lower lobe pulmonary micronodule measuring up to 3 mm on series 4 image 4. No follow up required. Coronary atherosclerosis. Small hiatal hernia.    HEPATOBILIARY: Hepatic steatosis. There is contour nodularity of the liver. Cholecystectomy.    PANCREAS: Normal.    SPLEEN: Splenomegaly.    ADRENAL GLANDS: Normal.    KIDNEYS/BLADDER: No significant mass, stone, or hydronephrosis.    BOWEL: Diverticulosis of the colon. No acute inflammatory change. No obstruction.  Normal appendix.    LYMPH NODES: Similar borderline to mildly enlarged jaguar hepatis, retroperitoneal, and portacaval lymph nodes. The three-year stability favors benignity. No new or enlarging lymph nodes.    VASCULATURE: Unremarkable.    PELVIC ORGANS: Normal.    MUSCULOSKELETAL: Degenerative changes of the spine and bilateral hips. Small fat-containing bilateral inguinal hernia. Partial visualized spine stimulator.      Impression    IMPRESSION:   1.  No acute findings or CT evidence for source of symptoms.  2.  Hepatic steatosis with findings suggestive of cirrhosis and/or  early fibrosis.     *Note: Due to a large number of results and/or encounters for the requested time period, some results have not been displayed. A complete set of results can be found in Results Review.       Discharge Medications   Current Discharge Medication List      START taking these medications    Details   cyclobenzaprine (FLEXERIL) 10 MG tablet Take 1 tablet (10 mg) by mouth 3 times daily as needed for muscle spasms  Qty: 21 tablet, Refills: 0    Associated Diagnoses: Spinal stenosis at L4-L5 level         CONTINUE these medications which have NOT CHANGED    Details   !! acetaminophen (TYLENOL) 325 MG tablet Take 2 tablets (650 mg) by mouth every 6 hours as needed for mild pain or other (and adjunct with moderate or severe pain or per patient request)  Qty: 60 tablet, Refills: 0    Associated Diagnoses: Bilateral lower leg cellulitis      !! acetaminophen (TYLENOL) 500 MG tablet Take 1,000 mg by mouth every 8 hours as needed      ammonium lactate (AMLACTIN) 12 % external cream Apply topically daily To feet and legs daily & as needed  Qty: 385 g, Refills: 1    Associated Diagnoses: Non-pressure ulcer of right lower extremity with fat layer exposed (H); Chronic ulcer of left leg with fat layer exposed (H)      aspirin (ASPIRIN LOW DOSE) 81 MG tablet Take 1 tablet (81 mg) by mouth daily  Qty: 30 tablet, Refills: 3    Associated Diagnoses: Hyperlipidemia LDL goal <100; Hypertension goal BP (blood pressure) < 140/90      atorvastatin (LIPITOR) 20 MG tablet Take 1.5 tablets (30 mg) by mouth daily  Qty: 135 tablet, Refills: 3    Associated Diagnoses: Hyperlipidemia LDL goal <100      Bioflavonoid Products (CANDIDO-C) TABS Take 1,000 mg by mouth daily  Qty: 90 tablet, Refills: 3    Associated Diagnoses: Chronic ulcer of right leg, limited to breakdown of skin (H)      buPROPion (WELLBUTRIN XL) 150 MG 24 hr tablet Take 150 mg by mouth every morning      calcium carbonate (OS-LUIS) 1500 (600 Ca) MG tablet Take 600 mg  by mouth daily      cariprazine (VRAYLAR) 4.5 MG capsule Take 4.5 mg by mouth daily      doxycycline hyclate (VIBRAMYCIN) 100 MG capsule Take 1 capsule (100 mg) by mouth 2 times daily for 30 days  Qty: 60 capsule, Refills: 0    Associated Diagnoses: Non-pressure ulcer of right lower extremity with fat layer exposed (H); Chronic ulcer of left leg with fat layer exposed (H)      DULoxetine (CYMBALTA) 30 MG capsule Take 30 mg by mouth 2 times daily      Folic Acid-Vit B6-Vit B12 0.5-5-0.2 MG TABS Take 1 tablet by mouth daily  Qty: 90 tablet, Refills: 3    Associated Diagnoses: Chronic ulcer of right leg, limited to breakdown of skin (H)      glucose 40 % (400 mg/mL) gel Take 15-30 g by mouth every 15 minutes as needed for low blood sugar  Qty: 15 g, Refills: 0    Associated Diagnoses: Type 2 diabetes mellitus without complication, unspecified whether long term insulin use (H)      hydrochlorothiazide (HYDRODIURIL) 25 MG tablet Take 1.5 tablets (37.5 mg) by mouth daily  Qty: 135 tablet, Refills: 0    Associated Diagnoses: Hypertension goal BP (blood pressure) < 140/90      insulin glargine (LANTUS PEN) 100 UNIT/ML pen Inject 10 Units Subcutaneous At Bedtime  Qty: 15 mL, Refills: 0    Comments: If Lantus is not covered by insurance, may substitute Basaglar or Semglee or other insulin glargine product per insurance preference at same dose and frequency.    Associated Diagnoses: Type 2 diabetes mellitus without complication, unspecified whether long term insulin use (H)      Lidocaine (LIDOCARE) 4 % Patch Place 1 patch onto the skin every 24 hours To prevent lidocaine toxicity, patient should be patch free for 12 hrs daily.  Qty: 10 patch, Refills: 0      loperamide (IMODIUM) 2 MG capsule Take 1 capsule (2 mg) by mouth 4 times daily as needed for diarrhea    Associated Diagnoses: Loose stools      losartan (COZAAR) 100 MG tablet Take 1 tablet (100 mg) by mouth daily  Qty: 90 tablet, Refills: 1    Associated Diagnoses:  Benign essential hypertension      medication given by implanted intrathecal pump continuous Drug # 1: Fentanyl (Sublimaze) - Conc: 2000 mcg/mL - Total Dose / 24 hours: 1663.3 mcg  Drug # 2: Bupivacaine (Marcaine)  - Conc: 20 mg/mL - Total Dose / 24 hours: 16.633 mg  Drug # 3: Morphine (Duramorph or Infumorph)  - Conc: 9 mg/mL - Total Dose / 24 hours: 7.484 mg    Rate: 0.0325 mL/hr  Pump Reservoir Volume: 40 mL  Pump Reservoir Alarm Volume: 2 ml  Outside Clinic & Provider: Dr. Pawan Shaw [facility]  Last Refill Date: 5/18/2023  Next Refill Date: 6/25/2023  Low [place] Alarm Date:  Pump : AMT II    Boluses: Up to 3 per day, 3 minute duration. Lock-out every 6 hours  Fentanyl: 99.9 mcg/dose  Bupivacaine: 0.999 mg/dose  Morphine: 0.4497 mg/dose      metFORMIN (GLUCOPHAGE XR) 500 MG 24 hr tablet Take 2 tablets (1,000 mg) by mouth 2 times daily (with meals)  Qty: 360 tablet, Refills: 1    Associated Diagnoses: Type 2 diabetes mellitus with other specified complication, without long-term current use of insulin (H)      metoprolol succinate ER (TOPROL XL) 50 MG 24 hr tablet Take 50 mg by mouth daily      metroNIDAZOLE (FLAGYL) 500 MG tablet Crush and sprinkle light layer over right leg, left leg and right toe wounds daily  Qty: 60 tablet, Refills: 0    Associated Diagnoses: Non-pressure ulcer of right lower extremity with fat layer exposed (H); Chronic ulcer of left leg with fat layer exposed (H)      modafinil (PROVIGIL) 200 MG tablet Take 1.5 tablets (300 mg) by mouth daily  Qty: 6 tablet, Refills: 0    Associated Diagnoses: Obstructive sleep apnea syndrome      multivitamin w/minerals (THERA-VIT-M) tablet Take 1 tablet by mouth daily Men's 50+      nitroGLYcerin (NITROSTAT) 0.4 MG sublingual tablet Place 0.4 mg under the tongue every 5 minutes as needed for chest pain For chest pain place 1 tablet under the tongue every 5 minutes for 3 doses. If symptoms persist 5 minutes after  "1st dose call 911.      OVER-THE-COUNTER Take 3 capsules by mouth daily Supplement for brain health, unknown brand/ingredients      oxyCODONE-acetaminophen (PERCOCET) 5-325 MG tablet Take 1 tablet by mouth 2 times daily      POTASSIUM PO Take 1 tablet by mouth daily Unspecified tablet strength; patient estimated \"600 mg, +/- 200 mg\", takes for leg cramps      !! pregabalin (LYRICA) 100 MG capsule Take 100 mg by mouth daily as needed (In addition to 100mg 3 times per day scheduled)      !! pregabalin (LYRICA) 100 MG capsule Take 100 mg by mouth 3 times daily      senna-docusate (SENOKOT-S/PERICOLACE) 8.6-50 MG tablet Take 1-2 tablets by mouth 2 times daily At baseline, Take 1 tablet twice daily. May take second tablet if needed.      SUMAtriptan (IMITREX) 50 MG tablet Take 1 tablet (50 mg) by mouth at onset of headache for migraine May repeat in 2 hours. Max 4 tablets/24 hours.  Qty: 8 tablet, Refills: 1    Associated Diagnoses: Mixed common migraine and muscle contraction headache      tamsulosin (FLOMAX) 0.4 MG capsule Take 1 capsule (0.4 mg) by mouth 2 times daily  Qty: 30 capsule, Refills: 3    Associated Diagnoses: Urinary retention with incomplete bladder emptying      testosterone (ANDROGEL/TESTIM) 50 MG/5GM (1%) topical gel Place 50 mg of testosterone onto the skin daily      vitamin B-Complex Take 3 tablets by mouth daily      vitamin D3 (CHOLECALCIFEROL) 1.25 MG (89053 UT) capsule Take 1 capsule (50,000 Units) by mouth every 7 days  Qty: 90 capsule, Refills: 3    Associated Diagnoses: Chronic ulcer of right leg, limited to breakdown of skin (H)      blood glucose (NO BRAND SPECIFIED) lancets standard Use to test blood sugar 1 time daily or as directed.  Qty: 100 lancet, Refills: 4    Associated Diagnoses: Type 2 diabetes mellitus with other specified complication, without long-term current use of insulin (H)      blood glucose (NO BRAND SPECIFIED) test strip Use to test blood sugar 1 time daily or as " directed.  Qty: 200 strip, Refills: 4    Associated Diagnoses: Type 2 diabetes mellitus with other specified complication, without long-term current use of insulin (H)      cefadroxil (DURICEF) 500 MG capsule Take 1 capsule (500 mg) by mouth 2 times daily  Qty: 14 capsule, Refills: 0    Associated Diagnoses: Bilateral lower leg cellulitis       !! - Potential duplicate medications found. Please discuss with provider.      STOP taking these medications       dulaglutide (TRULICITY) 0.75 MG/0.5ML pen Comments:   Reason for Stopping:             Allergies   Allergies   Allergen Reactions     Cephalexin Diarrhea     Liraglutide Other (See Comments), Headache and Unknown     Other reaction(s): Headache, Unknown  PN: migraines - Increased migraine frequency and severity       Sulfa Antibiotics Angioedema and Swelling     Pt has taken  Taken sulfa drugs orally without trouble. He had problems with Sulfa eye drops. Eye swelled up

## 2023-05-31 NOTE — PROGRESS NOTES
RECEIVING UNIT ED HANDOFF REVIEW    ED Nurse Handoff Report was reviewed by: Prince Anabella RN on May 30, 2023 at 10:51 PM

## 2023-05-31 NOTE — PROGRESS NOTES
Patient up to unit after 2300, requesting Fentanyl, asleep within few minutes. Refused total assessment, declined some medications but accepted Metoprolol and percocet. Still dressed up, skin not fully assessed but mepilex on left shin, scabs on right first, second, and third toes.

## 2023-05-31 NOTE — H&P
[facility]    History and Physical - Hospitalist Service       Date of Admission:  5/30/2023    Assessment & Plan      Parmjit Hernández is a 67 year old male admitted on 5/30/2023. He presents with nausea/vomiting and abdominal pain.      Abdominal pain, generalized    Nausea and vomiting, unspecified vomiting type    Leukocytosis    Assessment: Presents with uncontrolled generalized abdominal pain.  On admission CT abdomen/pelvis was obtained on admission that showed no acute findings or CT evidence for source of symptoms. Hepatic steatosis with findings suggestive of cirrhosis and/or early fibrosis.  Patient's labs are otherwise without significant derangement aside from mild elevation in his LFTs.    Plan:   -Admit to inpatient  -Pain control as needed  -Antiemetics as needed  -IV fluids overnight  -Follow vitals/temp      Hyperlipidemia    Hypertension goal BP (blood pressure) < 140/90    Peripheral vascular disease (H)    Assessment/Plan: Continue prior to admission Lipitor/aspirin/losartan.  Holding prior to admission hydrochlorothiazide in the setting of vomiting.      Type 2 diabetes mellitus (H)    Polyneuropathy, unspecified    Assessment: PTA on Trulicity/Lantus 10 units at bedtime/metformin    Plan:   -Continue prior to admission Lantus  -Hold prior to mission Trulicity and metformin  -Hypoglycemia protocol  -Sliding scale insulin as needed      Bipolar II disorder (H)    Other specified anxiety disorders    Insomnia due to medical condition    Assessment/Plan: Continue prior to admission Lyrica/Cymbalta once verified.  Continue prior to admission modafinil at discharge.      Light chain (AL) amyloidosis (H)    Assessment/Plan: Follow as an outpatient      Obstructive sleep apnea syndrome    Assessment/Plan: Continue prior to mission CPAP      Thrombocytopenia, unspecified (H)    Acquired pancytopenia (H)    Assessment/Plan: Hemoglobin/platelet stable admission at 16 and 163  "respectively.      Other cirrhosis of liver (H)    Assessment/Plan: Mildly elevated LFTs on admission.  We will monitor and recheck in a.m.        Continuous opioid dependence (H)    Chronic pain    Assessment/Plan: Has pain pump in place, treat pain as needed and limit narcotics if possible.      Lower extremity cellulitis.     Assessment/plan: Continue prior to admission ciprofloxacin       Diet:   ADAT  DVT Prophylaxis: Pneumatic Compression Devices  Parker Catheter: Not present  Lines: None     Cardiac Monitoring: None  Code Status:   FULL CODE    Clinically Significant Risk Factors Present on Admission                # Drug Induced Platelet Defect: home medication list includes an antiplatelet medication   # Hypertension: Noted on problem list     # DMII: A1C = 8.8 % (Ref range: <5.7 %) within past 6 months    # Obesity: Estimated body mass index is 38.57 kg/m  as calculated from the following:    Height as of 5/25/23: 1.68 m (5' 6.14\").    Weight as of 5/25/23: 108.9 kg (240 lb).            Disposition Plan      Expected Discharge Date: 05/31/2023                  Jaren Martinez MD  Hospitalist Service  [facility]  Securely message with [employer] (more info)  Text page via [facility] Paging/Directory     ______________________________________________________________________    Chief Complaint     Nausea/vomiting  Abdominal pain    History is obtained from the patient    History of Present Illness   Parmjit Hernández is a 67 year old male with past medical history of anxiety and depression, bipolar disorder, amyloidosis, hypertension, hyperlipidemia, type 2 diabetes mellitus who presents for evaluation of nausea/vomiting and abdominal pain..    Patient reports that around 9 AM in the morning admission he experienced sudden onset of generalized abdominal pain along with nausea and vomiting and some back pain.  He reports that he has had 1 bowel movement today that was nonbloody.  He otherwise denies " any recent bloody stools, no significant weight loss or night sweats.  He denies any chest pain or shortness of breath, he has no issues with constipation or new urinary complaints urgency or frequency, no hematuria.  He denies any recent abnormal food exposures, or recent travel.  Denies any recent sick contacts with similar symptoms. He otherwise has no other complaints time.       Past Medical History    Past Medical History:   Diagnosis Date     Acquired lymphedema 2/4/2019     Allergic state      Amyloidosis (H)     followed by hematology Dr. Romeo status post peripheral stem cell transplant     Anxiety 5/11/2016     Anxiety and depression 12/18/2013     Bipolar I disorder (H) 9/1/2015    Under care of psychiatrist [facility]- Dr Rahman doxepin 25 mg, 2 cap bedtime DULoxetine 20 mg once daily  OLANZapine 7.5 mg  1 tab bedtime      DDD (degenerative disc disease), lumbar 8/6/2020     Depressive disorder 1995     EKG abnormality 4/20/2020     H/O bone marrow transplant (H)      Headache(784.0)      History of diverticulitis 1/27/2015    1/15-rxed with antibiotics      Hyperlipidemia LDL goal <100 10/31/2010     Hypertension 2007     Hypertension goal BP (blood pressure) < 140/90 10/11/2011     Marianne (H) 8/20/2015     Morbid obesity (H) 8/28/2018     Myalgia and myositis, unspecified      Narcotic dependence (H) 9/6/2016    None in about 6 months- 8/1/17     NSTEMI (non-ST elevated myocardial infarction) (H) 04/19/2020     OCD (obsessive compulsive disorder)      Other acquired absence of organ 94     Other cirrhosis of liver (H) possibly from vences  4/15/2020     Other specified viral warts      Peripheral edema 5/20/2018    Noncardiac Continue with  furosemide (LASIX) 20 MG tablet,  potassium       chloride SA (K-DUR/KLOR-CON M) 10 MEQ CR  Tab once daily  Basic metabolic panel     Polyneuropathy associated with underlying disease (H) 2/4/2019     Restless legs syndrome (RLS) 6/29/2017     Stasis dermatitis of both  "legs 12/31/2018     Type 2 diabetes mellitus with other specified complication (H) 7/10/2017       Past Surgical History   Past Surgical History:   Procedure Laterality Date     ABDOMEN SURGERY  2007    abdominal hernia     BACK SURGERY  8/6/2020     BIOPSY  june 2015    negative     BMT PROTOCOL      for systemic amyloidosis     BONE MARROW BIOPSY, BONE SPECIMEN, NEEDLE/TROCAR N/A 6/8/2016    Procedure: BIOPSY BONE MARROW;  Surgeon: Nathan Agrawal MD;  Location:  GI     BONE MARROW BIOPSY, BONE SPECIMEN, NEEDLE/TROCAR N/A 2/20/2019    Procedure: BIOPSY BONE MARROW;  Surgeon: Michael Raygoza MD;  Location:  GI     CHOLECYSTECTOMY  1995    lap qian     COLONOSCOPY  2012    hx polyps     ESOPHAGOSCOPY, GASTROSCOPY, DUODENOSCOPY (EGD), COMBINED N/A 5/29/2015    Procedure: COMBINED ESOPHAGOSCOPY, GASTROSCOPY, DUODENOSCOPY (EGD), BIOPSY SINGLE OR MULTIPLE;  Surgeon: John Jacob MD;  Location:  GI     HERNIA REPAIR, UMBILICAL  2006     [facility] NONSPECIFIC PROCEDURE  94    Cholecystectomy     [facility] NONSPECIFIC PROCEDURE  2000    repair deviated septum       Prior to Admission Medications   Prior to Admission Medications   Prescriptions Last Dose Informant Patient Reported? Taking?   Bioflavonoid Products (CANDIDO-C) TABS  Self No No   Sig: Take 1,000 mg by mouth daily   DULoxetine (CYMBALTA) 30 MG capsule  Self Yes No   Sig: Take 30 mg by mouth 2 times daily   Folic Acid-Vit B6-Vit B12 0.5-5-0.2 MG TABS  Self No No   Sig: Take 1 tablet by mouth daily   Lidocaine (LIDOCARE) 4 % Patch   No No   Sig: Place 1 patch onto the skin every 24 hours To prevent lidocaine toxicity, patient should be patch free for 12 hrs daily.   OVER-THE-COUNTER  Self Yes No   Sig: Take 3 capsules by mouth daily Supplement for brain health, unknown brand/ingredients   POTASSIUM PO  Self Yes No   Sig: Take 1 tablet by mouth daily Unspecified tablet strength; patient estimated \"600 mg, +/- 200 mg\", takes for leg cramps "   SUMAtriptan (IMITREX) 50 MG tablet  Self No No   Sig: Take 1 tablet (50 mg) by mouth at onset of headache for migraine May repeat in 2 hours. Max 4 tablets/24 hours.   acetaminophen (TYLENOL) 325 MG tablet   No No   Sig: Take 2 tablets (650 mg) by mouth every 6 hours as needed for mild pain or other (and adjunct with moderate or severe pain or per patient request)   acetaminophen (TYLENOL) 500 MG tablet  Self Yes No   Sig: Take 1,000 mg by mouth every 8 hours as needed   ammonium lactate (AMLACTIN) 12 % external cream  Self No No   Sig: Apply topically daily To feet and legs daily & as needed   aspirin (ASPIRIN LOW DOSE) 81 MG tablet  Self No No   Sig: Take 1 tablet (81 mg) by mouth daily   atorvastatin (LIPITOR) 20 MG tablet  Self No No   Sig: Take 1.5 tablets (30 mg) by mouth daily   blood glucose (NO BRAND SPECIFIED) lancets standard   No No   Sig: Use to test blood sugar 1 time daily or as directed.   blood glucose (NO BRAND SPECIFIED) test strip   No No   Sig: Use to test blood sugar 1 time daily or as directed.   buPROPion (WELLBUTRIN XL) 150 MG 24 hr tablet  Self Yes No   Sig: Take 150 mg by mouth every morning   calcium carbonate (OS-LUIS) 1500 (600 Ca) MG tablet  Self Yes No   Sig: Take 600 mg by mouth daily   cariprazine (VRAYLAR) 4.5 MG capsule  Self Yes No   Sig: Take 4.5 mg by mouth daily   cefadroxil (DURICEF) 500 MG capsule   No No   Sig: Take 1 capsule (500 mg) by mouth 2 times daily   doxycycline hyclate (VIBRAMYCIN) 100 MG capsule  Self No No   Sig: Take 1 capsule (100 mg) by mouth 2 times daily for 30 days   dulaglutide (TRULICITY) 0.75 MG/0.5ML pen  Self No No   Sig: Inject 0.75 mg Subcutaneous every 7 days   Patient not taking: Reported on 3/21/2023   glucose 40 % (400 mg/mL) gel  Self No No   Sig: Take 15-30 g by mouth every 15 minutes as needed for low blood sugar   hydrochlorothiazide (HYDRODIURIL) 25 MG tablet  Self No No   Sig: Take 1.5 tablets (37.5 mg) by mouth daily   insulin glargine  (LANTUS PEN) 100 UNIT/ML pen  Self No No   Sig: Inject 10 Units Subcutaneous At Bedtime   loperamide (IMODIUM) 2 MG capsule  Self No No   Sig: Take 1 capsule (2 mg) by mouth 4 times daily as needed for diarrhea   losartan (COZAAR) 100 MG tablet  Self No No   Sig: Take 1 tablet (100 mg) by mouth daily   medication given by implanted intrathecal pump  Other Yes No   Sig: continuous Drug # 1: Fentanyl (Sublimaze) - Conc: 2000 mcg/mL - Total Dose / 24 hours: 1663.3 mcg  Drug # 2: Bupivacaine (Marcaine)  - Conc: 20 mg/mL - Total Dose / 24 hours: 16.633 mg  Drug # 3: Morphine (Duramorph or Infumorph)  - Conc: 9 mg/mL - Total Dose / 24 hours: 7.484 mg    Rate: 0.0325 mL/hr  Pump Reservoir Volume: 40 mL  Pump Reservoir Alarm Volume: 2 ml  Outside Clinic & Provider: Dr. Pawan Shaw [facility]  Last Refill Date: 5/18/2023  Next Refill Date: 6/25/2023  [place] Alarm Date:  Pump : Solar Notion II    Boluses: Up to 3 per day, 3 minute duration. Lock-out every 6 hours  Fentanyl: 99.9 mcg/dose  Bupivacaine: 0.999 mg/dose  Morphine: 0.4497 mg/dose   metFORMIN (GLUCOPHAGE XR) 500 MG 24 hr tablet  Self Yes No   Sig: Take 2 tablets (1,000 mg) by mouth 2 times daily (with meals)   metoprolol succinate ER (TOPROL XL) 50 MG 24 hr tablet  Self Yes No   Sig: Take 50 mg by mouth daily   metroNIDAZOLE (FLAGYL) 500 MG tablet  Self No No   Sig: Crush and sprinkle light layer over right leg, left leg and right toe wounds daily   modafinil (PROVIGIL) 200 MG tablet   No No   Sig: Take 1.5 tablets (300 mg) by mouth daily   multivitamin w/minerals (THERA-VIT-M) tablet  Self Yes No   Sig: Take 1 tablet by mouth daily Men's 50+   nitroGLYcerin (NITROSTAT) 0.4 MG sublingual tablet  Self Yes No   Sig: Place 0.4 mg under the tongue every 5 minutes as needed for chest pain For chest pain place 1 tablet under the tongue every 5 minutes for 3 doses. If symptoms persist 5 minutes after 1st dose call 911.    oxyCODONE-acetaminophen (PERCOCET) 5-325 MG tablet  Self Yes No   Sig: Take 1 tablet by mouth 2 times daily   pregabalin (LYRICA) 100 MG capsule  Self Yes No   Sig: Take 100 mg by mouth Take up to 4 times per day   senna-docusate (SENOKOT-S/PERICOLACE) 8.6-50 MG tablet  Self Yes No   Sig: Take 1-2 tablets by mouth 2 times daily At baseline, Take 1 tablet twice daily. May take second tablet if needed.   tamsulosin (FLOMAX) 0.4 MG capsule  Self No No   Sig: Take 1 capsule (0.4 mg) by mouth 2 times daily   testosterone (ANDROGEL/TESTIM) 50 MG/5GM (1%) topical gel  Self Yes No   Sig: Place 50 mg of testosterone onto the skin daily   vitamin B-Complex  Self Yes No   Sig: Take 3 tablets by mouth daily   vitamin D3 (CHOLECALCIFEROL) 1.25 MG (89129 UT) capsule  Self No No   Sig: Take 1 capsule (50,000 Units) by mouth every 7 days      Facility-Administered Medications: None        Review of Systems    The 10 point Review of Systems is negative other than noted in the HPI or here.     Social History   I have reviewed this patient's social history and updated it with pertinent information if needed.  Social History     Tobacco Use     Smoking status: Never     Smokeless tobacco: Never   Vaping Use     Vaping status: Never Used   Substance Use Topics     Alcohol use: Not Currently     Alcohol/week: 0.0 standard drinks of alcohol     Drug use: Not Currently     Types: Cocaine, Marijuana, Methamphetamines, Opiates, IV, Amphetamines, Barbiturates, Benzodiazepines, Codeine, Fentanyl, Hashish, Hydrocodone, Hydromorphone, LSD, Oxycodone       Family History   I have reviewed this patient's family history and updated it with pertinent information if needed.  Family History   Problem Relation Age of Onset     Cerebrovascular Disease Mother      C.A.D. Mother      Hypertension Mother      Alcohol/Drug Mother      Arthritis Mother      Cerebrovascular Disease Maternal Grandmother      C.A.D. Maternal Grandmother      Alcohol/Drug  Maternal Grandmother      ARMÓN. Father      Heart Disease Father      Hypertension Father      Alcohol/Drug Father      Allergies Father      Circulatory Father      Depression Father      Respiratory Father      Asthma Father      Alcohol/Drug Paternal Grandfather      Cerebrovascular Disease Paternal Grandfather      Depression Son      Psychotic Disorder Son         anxiety disorder     Depression Daughter      Cerebrovascular Disease Maternal Grandfather      Cerebrovascular Disease Paternal Grandmother      Diabetes Brother      Allergies Sister      Depression Sister      Gynecology Sister        Allergies   Allergies   Allergen Reactions     Cephalexin Diarrhea     Liraglutide Other (See Comments), Headache and Unknown     Other reaction(s): Headache, Unknown  PN: migraines - Increased migraine frequency and severity       Sulfa Antibiotics Angioedema and Swelling     Pt has taken  Taken sulfa drugs orally without trouble. He had problems with Sulfa eye drops. Eye swelled up       ------------------------------------------------------------------------     Physical Exam   Vital Signs: Temp: 97.6  F (36.4  C) Temp src: Oral BP: (!) 171/104 Pulse: 98   Resp: 18 SpO2: 99 % O2 Device: None (Room air)    Weight: 0 lbs 0 oz     Constitutional: awake, alert, cooperative, no apparent distress.   Eyes: Lids and lashes normal, pupils equal, round and reactive to light   ENT: Normocephalic, without obvious abnormality, atraumatic, sinuses nontender on palpation   Hematologic / Lymphatic: no cervical lymphadenopathy   Respiratory: CTABL   Cardiovascular: RRR with no m/r/g   GI: Normal bowel sounds, soft, non-distended, non-tender.   Skin: normal skin color, texture, turgor   Musculoskeletal: There is no redness, warmth, or swelling of the joints. Full range of motion noted.   Neurologic: Awake, alert, oriented to name, place and time. Cranial nerves II-XII are grossly intact. Motor is 5 out of 5 bilaterally. Sensory is  intact.   Neuropsychiatric: normal mood and affect      ----------------------------------------------------------------------------------------------------------------------------------    Medical Decision Making       65 MINUTES SPENT BY ME on the date of service doing chart review, history, exam, documentation & further activities per the note.      Data     I have personally reviewed the following data over the past 24 hrs:    11.9 (H)  \   16.0   / 163     135 (L) 96 (L) 12.7 /  244 (H)   4.7 24 0.76 \       ALT: 53 (H) AST: 54 (H) AP: 137 (H) TBILI: 1.5 (H)   ALB: 4.3 TOT PROTEIN: 7.2 LIPASE: 11 (L)       Procal: N/A CRP: N/A Lactic Acid: 2.0         Imaging results reviewed over the past 24 hrs:   Recent Results (from the past 24 hour(s))   CT Abdomen Pelvis w Contrast    Narrative    EXAM: CT ABDOMEN PELVIS W CONTRAST  LOCATION: [facility]  DATE/TIME: 5/30/2023 5:24 PM CDT    INDICATION: Diffuse abdominal pain, sudden onset about 6 hours ago, history of  cholecystectomy, nauseated  COMPARISON: 09/15/2018  TECHNIQUE: CT scan of the abdomen and pelvis was performed following injection of IV contrast. Multiplanar reformats were obtained. Dose reduction techniques were used.  CONTRAST:  121 mL isovue 370    FINDINGS:   LOWER CHEST: Patchy bibasilar atelectasis and scarring. Minimal bronchiectasis. Stable benign right lower lobe pulmonary micronodule measuring up to 3 mm on series 4 image 4. No follow up required. Coronary atherosclerosis. Small hiatal hernia.    HEPATOBILIARY: Hepatic steatosis. There is contour nodularity of the liver. Cholecystectomy.    PANCREAS: Normal.    SPLEEN: Splenomegaly.    ADRENAL GLANDS: Normal.    KIDNEYS/BLADDER: No significant mass, stone, or hydronephrosis.    BOWEL: Diverticulosis of the colon. No acute inflammatory change. No obstruction.  Normal appendix.    LYMPH NODES: Similar borderline to mildly enlarged jaguar hepatis, retroperitoneal, and  portacaval lymph nodes. The three-year stability favors benignity. No new or enlarging lymph nodes.    VASCULATURE: Unremarkable.    PELVIC ORGANS: Normal.    MUSCULOSKELETAL: Degenerative changes of the spine and bilateral hips. Small fat-containing bilateral inguinal hernia. Partial visualized spine stimulator.      Impression    IMPRESSION:   1.  No acute findings or CT evidence for source of symptoms.  2.  Hepatic steatosis with findings suggestive of cirrhosis and/or early fibrosis.     Most Recent 3 CBC's:Recent Labs   Lab Test 05/30/23 1510 05/21/23  0704 05/19/23  0949 05/18/23  1436   WBC 11.9*  --  5.4 4.2   HGB 16.0  --  13.6 14.1   MCV 93  --  96 95    183 148* 127*     Most Recent 3 BMP's:Recent Labs   Lab Test 05/30/23 1510 05/21/23  1130 05/21/23  0704 05/20/23  0804 05/20/23  0706 05/19/23  1226 05/19/23  0949 05/18/23 2018 05/18/23  1436   *  --   --   --   --   --  138  --  138   POTASSIUM 4.7  --  4.1  --  4.2  --  4.9  --  4.1   CHLORIDE 96*  --   --   --   --   --  102  --  100   CO2 24  --   --   --   --   --  26  --  29   BUN 12.7  --   --   --   --   --  16.3  --  14.4   CR 0.76  --  1.06  --   --   --  0.79   < > 0.77   ANIONGAP 15  --   --   --   --   --  10  --  9   LUIS 9.2  --   --   --   --   --  9.0  --  9.2   * 221* 170*   < >  --    < > 224*   < > 265*    < > = values in this interval not displayed.     Most Recent 2 LFT's:Recent Labs   Lab Test 05/30/23 1510 05/18/23  1436   AST 54* 31   ALT 53* 26   ALKPHOS 137* 100   BILITOTAL 1.5* 0.7     Most Recent 3 INR's:Recent Labs   Lab Test 04/17/20 2009 01/27/18  1556 02/19/17  0734   INR 1.18* 1.06 1.08

## 2023-05-31 NOTE — PROGRESS NOTES
Care Management Follow Up    Length of Stay (days): 0    Expected Discharge Date: 05/31/2023     Concerns to be Addressed:       Patient plan of care discussed at interdisciplinary rounds: Yes    Anticipated Discharge Disposition:       Anticipated Discharge Services:    Anticipated Discharge DME:      Patient/family educated on Medicare website which has current facility and service quality ratings:    Education Provided on the Discharge Plan:    Patient/Family in Agreement with the Plan:      Referrals Placed by CM/SW:    Private pay costs discussed: Not applicable    Additional Information:  SW was asked to find patient a ride home. SW called patient to discuss situation. Was able to provide a [employer] provided by cost center per policy for patient in able to facilitate discharge as patients ride and finances would not allow.       WILBER Cuenca

## 2023-05-31 NOTE — PROGRESS NOTES
Pt A&O x4, up x1 with walker and GB. VSS.RA. Pain managed with Percocet and Flexeril. AVS and med reviewed with pt. All questions answered. Pt discharge home with Taxi at 1700.

## 2023-05-31 NOTE — ED NOTES
[facility]  ED Nurse Handoff Report    ED Chief complaint: Nausea and Diarrhea      ED Diagnosis:   Final diagnoses:   Abdominal pain, generalized   Nausea and vomiting, unspecified vomiting type       Code Status: MD need to assess    Allergies:   Allergies   Allergen Reactions     Cephalexin Diarrhea     Liraglutide Other (See Comments), Headache and Unknown     Other reaction(s): Headache, Unknown  PN: migraines - Increased migraine frequency and severity       Sulfa Antibiotics Angioedema and Swelling     Pt has taken  Taken sulfa drugs orally without trouble. He had problems with Sulfa eye drops. Eye swelled up         Patient Story: Patient c/o abdominal pain and nausea  Focused Assessment:  Pain assessment     Treatments and/or interventions provided: Dilaudid and Zofran given. CT abdominal done  Patient's response to treatments and/or interventions: Continue plan of care for pain.    To be done/followed up on inpatient unit:  Pain assessment    Does this patient have any cognitive concerns?: NA    Activity level - Baseline/Home:  Independent  Activity Level - Current:   Stand with Assist    Patient's Preferred language: English   Needed?: No    Isolation: None  Infection: Not Applicable  Patient tested for COVID 19 prior to admission: NO  Bariatric?: No    Vital Signs:   Vitals:    05/30/23 1442 05/30/23 1948   BP: (!) 171/104    Pulse: 98    Resp: 18 18   Temp: 97.6  F (36.4  C)    TempSrc: Oral    SpO2: 99%      CT Abdomen Pelvis w Contrast   Final Result   IMPRESSION:    1.  No acute findings or CT evidence for source of symptoms.   2.  Hepatic steatosis with findings suggestive of cirrhosis and/or early fibrosis.        Labs Ordered and Resulted from Time of ED Arrival to Time of ED Departure   COMPREHENSIVE METABOLIC PANEL - Abnormal       Result Value    Sodium 135 (*)     Potassium 4.7      Chloride 96 (*)     Carbon Dioxide (CO2) 24      Anion Gap 15      Urea Nitrogen  12.7      Creatinine 0.76      Calcium 9.2      Glucose 244 (*)     Alkaline Phosphatase 137 (*)     AST 54 (*)     ALT 53 (*)     Protein Total 7.2      Albumin 4.3      Bilirubin Total 1.5 (*)     GFR Estimate >90     LIPASE - Abnormal    Lipase 11 (*)    CBC WITH PLATELETS AND DIFFERENTIAL - Abnormal    WBC Count 11.9 (*)     RBC Count 4.93      Hemoglobin 16.0      Hematocrit 46.0      MCV 93      MCH 32.5      MCHC 34.8      RDW 13.5      Platelet Count 163      % Neutrophils 92      % Lymphocytes 3      % Monocytes 4      % Eosinophils 0      % Basophils 0      % Immature Granulocytes 1      NRBCs per 100 WBC 0      Absolute Neutrophils 10.9 (*)     Absolute Lymphocytes 0.4 (*)     Absolute Monocytes 0.5      Absolute Eosinophils 0.0      Absolute Basophils 0.1      Absolute Immature Granulocytes 0.1      Absolute NRBCs 0.0     LACTIC ACID WHOLE BLOOD - Normal    Lactic Acid 2.0       Cardiac Rhythm:     Was the PSS-3 completed:   Yes  What interventions are required if any?               Family Comments: none  OBS brochure/video discussed/provided to patient/family: Yes              Name of person given brochure if not patient:               Relationship to patient:     For the majority of the shift this patient's behavior was Green.   Behavioral interventions performed were None.    ED NURSE PHONE NUMBER: *[phone]

## 2023-05-31 NOTE — PROGRESS NOTES
Patient continued to rate pain as high as 10/10, Dr. Estes contacted, Toradol ordered and given. Patient reported slight relief but asleep, refused blood pressure recheck twice. No additional PRNs given. Has been non compliant with some cares, using foul language as well.

## 2023-05-31 NOTE — ED NOTES
Patient is asking Fentanyl instead of Dilaudid. Patient states Fentanyl is in his pain pump. MD notified.

## 2023-05-31 NOTE — PHARMACY-ADMISSION MEDICATION HISTORY
Pharmacist Admission Medication History    Admission medication history is complete. The information provided in this note is only as accurate as the sources available at the time of the update.    Medication reconciliation/reorder completed by provider prior to medication history? No    Information Source(s): Patient and hospital discharge 5/21/23 via in-person    Pertinent Information: He reports not taking any medications for several days due to feeling ill. He reports no changes to medications since hospital discharge 5/21/23 except lidocaine patch added for rib fractures.    Medication History Completed By: Neeta Grady [facility] 5/30/2023 9:13 PM    Prior to Admission medications    Medication Sig Last Dose Taking? Auth Provider Long Term End Date   acetaminophen (TYLENOL) 325 MG tablet Take 2 tablets (650 mg) by mouth every 6 hours as needed for mild pain or other (and adjunct with moderate or severe pain or per patient request)  Yes Nohemi Merlos MD     acetaminophen (TYLENOL) 500 MG tablet Take 1,000 mg by mouth every 8 hours as needed  Yes Reported, Patient     ammonium lactate (AMLACTIN) 12 % external cream Apply topically daily To feet and legs daily & as needed  Yes Regino Stanford MD     aspirin (ASPIRIN LOW DOSE) 81 MG tablet Take 1 tablet (81 mg) by mouth daily Past Week Yes Vince Henry MD No    atorvastatin (LIPITOR) 20 MG tablet Take 1.5 tablets (30 mg) by mouth daily Past Week Yes Vince Henry MD Yes    Bioflavonoid Products (CANDIDO-C) TABS Take 1,000 mg by mouth daily Unknown Yes Regino Stanford MD     buPROPion (WELLBUTRIN XL) 150 MG 24 hr tablet Take 150 mg by mouth every morning Past Week Yes Unknown, Entered By History Yes    calcium carbonate (OS-LUIS) 1500 (600 Ca) MG tablet Take 600 mg by mouth daily Past Week Yes Unknown, Entered By History     cariprazine (VRAYLAR) 4.5 MG capsule Take 4.5 mg by mouth daily Past Week Yes Unknown, Entered By History     doxycycline  hyclate (VIBRAMYCIN) 100 MG capsule Take 1 capsule (100 mg) by mouth 2 times daily for 30 days Past Week Yes Regino Stanford MD  6/9/23   DULoxetine (CYMBALTA) 30 MG capsule Take 30 mg by mouth 2 times daily Past Week Yes Reported, Patient     Folic Acid-Vit B6-Vit B12 0.5-5-0.2 MG TABS Take 1 tablet by mouth daily Past Week Yes Regino Stanford MD     glucose 40 % (400 mg/mL) gel Take 15-30 g by mouth every 15 minutes as needed for low blood sugar  Yes Dashawn Franklin DO     hydrochlorothiazide (HYDRODIURIL) 25 MG tablet Take 1.5 tablets (37.5 mg) by mouth daily Past Week Yes Maribeth Ardon MD Yes    insulin glargine (LANTUS PEN) 100 UNIT/ML pen Inject 10 Units Subcutaneous At Bedtime Past Week Yes Dashawn Franklin DO Yes    Lidocaine (LIDOCARE) 4 % Patch Place 1 patch onto the skin every 24 hours To prevent lidocaine toxicity, patient should be patch free for 12 hrs daily. Does not have one on Yes Ravi Mckay MD No    loperamide (IMODIUM) 2 MG capsule Take 1 capsule (2 mg) by mouth 4 times daily as needed for diarrhea  Yes Dashawn Franklin DO     losartan (COZAAR) 100 MG tablet Take 1 tablet (100 mg) by mouth daily Past Week Yes Rodrick Ferrer MD Yes    medication given by implanted intrathecal pump continuous Drug # 1: Fentanyl (Sublimaze) - Conc: 2000 mcg/mL - Total Dose / 24 hours: 1663.3 mcg  Drug # 2: Bupivacaine (Marcaine)  - Conc: 20 mg/mL - Total Dose / 24 hours: 16.633 mg  Drug # 3: Morphine (Duramorph or Infumorph)  - Conc: 9 mg/mL - Total Dose / 24 hours: 7.484 mg    Rate: 0.0325 mL/hr  Pump Reservoir Volume: 40 mL  Pump Reservoir Alarm Volume: 2 ml  Outside Clinic & Provider: [facility]  Last Refill Date: 5/18/2023  Next Refill Date: 6/25/2023  [place] Alarm Date:  Pump : [employer] SynchroMed II    Boluses: Up to 3 per day, 3 minute duration. Lock-out every 6 hours  Fentanyl: 99.9 mcg/dose  Bupivacaine: 0.999 mg/dose  Morphine:  "0.4497 mg/dose Infusing Yes Unknown, Entered By History     metFORMIN (GLUCOPHAGE XR) 500 MG 24 hr tablet Take 2 tablets (1,000 mg) by mouth 2 times daily (with meals) Past Week Yes Maribeth Ardon MD Yes    metoprolol succinate ER (TOPROL XL) 50 MG 24 hr tablet Take 50 mg by mouth daily Past Week Yes Unknown, Entered By History No    metroNIDAZOLE (FLAGYL) 500 MG tablet Crush and sprinkle light layer over right leg, left leg and right toe wounds daily Past Week Yes Regino Stanford MD     modafinil (PROVIGIL) 200 MG tablet Take 1.5 tablets (300 mg) by mouth daily Past Week Yes Ivan Jimenez MD     multivitamin w/minerals (THERA-VIT-M) tablet Take 1 tablet by mouth daily Men's 50+ Past Week Yes Unknown, Entered By History     nitroGLYcerin (NITROSTAT) 0.4 MG sublingual tablet Place 0.4 mg under the tongue every 5 minutes as needed for chest pain For chest pain place 1 tablet under the tongue every 5 minutes for 3 doses. If symptoms persist 5 minutes after 1st dose call 911.  Yes Unknown, Entered By History     OVER-THE-COUNTER Take 3 capsules by mouth daily Supplement for brain health, unknown brand/ingredients Past Week Yes Unknown, Entered By History     oxyCODONE-acetaminophen (PERCOCET) 5-325 MG tablet Take 1 tablet by mouth 2 times daily Past Week Yes Unknown, Entered By History     POTASSIUM PO Take 1 tablet by mouth daily Unspecified tablet strength; patient estimated \"600 mg, +/- 200 mg\", takes for leg cramps Past Week Yes Unknown, Entered By History     pregabalin (LYRICA) 100 MG capsule Take 100 mg by mouth daily as needed (In addition to 100mg 3 times per day scheduled) Past Week Yes Unknown, Entered By History No    pregabalin (LYRICA) 100 MG capsule Take 100 mg by mouth 3 times daily Past Week Yes Unknown, Entered By History No    senna-docusate (SENOKOT-S/PERICOLACE) 8.6-50 MG tablet Take 1-2 tablets by mouth 2 times daily At baseline, Take 1 tablet twice daily. May take second tablet if " needed. Past Week Yes Unknown, Entered By History     SUMAtriptan (IMITREX) 50 MG tablet Take 1 tablet (50 mg) by mouth at onset of headache for migraine May repeat in 2 hours. Max 4 tablets/24 hours.  Yes Maribeth Ardon MD     tamsulosin (FLOMAX) 0.4 MG capsule Take 1 capsule (0.4 mg) by mouth 2 times daily Past Week Yes Vince Henry MD     testosterone (ANDROGEL/TESTIM) 50 MG/5GM (1%) topical gel Place 50 mg of testosterone onto the skin daily Past Week Yes Unknown, Entered By History Yes    vitamin B-Complex Take 3 tablets by mouth daily Past Week Yes Unknown, Entered By History     vitamin D3 (CHOLECALCIFEROL) 1.25 MG (82496 UT) capsule Take 1 capsule (50,000 Units) by mouth every 7 days Past Week Yes Regino Stanford MD     blood glucose (NO BRAND SPECIFIED) lancets standard Use to test blood sugar 1 time daily or as directed.   Vince Henry MD     blood glucose (NO BRAND SPECIFIED) test strip Use to test blood sugar 1 time daily or as directed.   Vince Henry MD     cefadroxil (DURICEF) 500 MG capsule Take 1 capsule (500 mg) by mouth 2 times daily Completed  Nohemi Merlos MD     dulaglutide (TRULICITY) 0.75 MG/0.5ML pen Inject 0.75 mg Subcutaneous every 7 days  Patient not taking: Reported on 3/21/2023 Not Taking  Sherwin Mejia DO

## 2023-06-01 ENCOUNTER — MYC MEDICAL ADVICE (OUTPATIENT)
Dept: FAMILY MEDICINE | Facility: CLINIC | Age: 68
End: 2023-06-01

## 2023-06-01 ENCOUNTER — HOSPITAL ENCOUNTER (OUTPATIENT)
Dept: WOUND CARE | Facility: CLINIC | Age: 68
Discharge: HOME | End: 2023-06-01
Attending: FAMILY MEDICINE | Admitting: FAMILY MEDICINE
Payer: COMMERCIAL

## 2023-06-01 VITALS — TEMPERATURE: 98 F | DIASTOLIC BLOOD PRESSURE: 100 MMHG | SYSTOLIC BLOOD PRESSURE: 168 MMHG | HEART RATE: 81 BPM

## 2023-06-01 DIAGNOSIS — I87.2 VENOUS STASIS ULCER OF OTHER PART OF LEFT LOWER LEG LIMITED TO BREAKDOWN OF SKIN WITHOUT VARICOSE VEINS (H): ICD-10-CM

## 2023-06-01 DIAGNOSIS — F31.9 BIPOLAR I DISORDER (H): Primary | ICD-10-CM

## 2023-06-01 DIAGNOSIS — L97.821 VENOUS STASIS ULCER OF OTHER PART OF LEFT LOWER LEG LIMITED TO BREAKDOWN OF SKIN WITHOUT VARICOSE VEINS (H): ICD-10-CM

## 2023-06-01 DIAGNOSIS — I10 HYPERTENSION GOAL BP (BLOOD PRESSURE) < 140/90: ICD-10-CM

## 2023-06-01 DIAGNOSIS — G47.33 OBSTRUCTIVE SLEEP APNEA SYNDROME: ICD-10-CM

## 2023-06-01 PROBLEM — L97.911 CHRONIC ULCER OF RIGHT LEG, LIMITED TO BREAKDOWN OF SKIN (H): Status: RESOLVED | Noted: 2023-03-16 | Resolved: 2023-06-01

## 2023-06-01 PROBLEM — L97.912: Status: RESOLVED | Noted: 2023-05-10 | Resolved: 2023-06-01

## 2023-06-01 PROBLEM — I83.009 STASIS ULCER (H): Status: RESOLVED | Noted: 2022-10-31 | Resolved: 2023-06-01

## 2023-06-01 PROBLEM — L97.909 STASIS ULCER (H): Status: RESOLVED | Noted: 2022-10-31 | Resolved: 2023-06-01

## 2023-06-01 PROBLEM — L97.922 CHRONIC ULCER OF LEFT LEG WITH FAT LAYER EXPOSED (H): Status: RESOLVED | Noted: 2023-05-10 | Resolved: 2023-06-01

## 2023-06-01 PROCEDURE — 11055 PARING/CUTG B9 HYPRKER LES 1: CPT | Performed by: FAMILY MEDICINE

## 2023-06-01 PROCEDURE — G0463 HOSPITAL OUTPT CLINIC VISIT: HCPCS | Mod: 25

## 2023-06-01 PROCEDURE — 99215 OFFICE O/P EST HI 40 MIN: CPT | Mod: 25 | Performed by: FAMILY MEDICINE

## 2023-06-01 RX ORDER — MODAFINIL 200 MG/1
300 TABLET ORAL DAILY
Qty: 6 TABLET | Refills: 0 | Status: SHIPPED | OUTPATIENT
Start: 2023-06-01 | End: 2023-07-03

## 2023-06-01 RX ORDER — HYDROCHLOROTHIAZIDE 25 MG/1
37.5 TABLET ORAL DAILY
Qty: 135 TABLET | Refills: 0 | Status: SHIPPED | OUTPATIENT
Start: 2023-06-01 | End: 2023-07-05

## 2023-06-01 NOTE — TELEPHONE ENCOUNTER
DE,    Routing refill request to provider for review/approval because:  Please see [employer] message.  Patient seen in ED 4 times in May - 2 required hospitalization.    Last OV 4/17/23.    Thank you,  Wandy Pena RN

## 2023-06-01 NOTE — ADDENDUM NOTE
Encounter addended by: Avelina Rivera RN on: 6/1/2023 12:02 PM   Actions taken: Clinical Note Signed

## 2023-06-01 NOTE — DISCHARGE INSTRUCTIONS
Parmjit Hernández      1955  A DME order was not completed because supplies were not needed    Medications/supplements to aid in healing:  Vitamin D3 5,000 iu per day  Rosalia C 1,000 mg take daily  Vitamin B Complex with folic acid take 1 tablet daily   Vitamin B 12 1000 mcg daily  5. Lymphatic Formula 1000: Take one (1) 500mg capsule daily for the rest of your life.  6. 400mg Magnesium Sulfate at bedtime to help leg pain     Antibiotics as prescribed + probiotics to help with diarrhea (take 2 hours before or 2 hours after antibiotics)     Follow up nail and callus care with Podiatry. Callus care is essential to preventing skin infections.     Wound care: Right 1st toe; Right lateral lower leg; Left lateral lower leg  Shower and cleanse both legs with mild unscented soap and water (such as Cetaphil, Cerave or Dove); rinse, pat dry  Cover scab to Left leg with Mepilex 4x4 silicone border dressing until scab falls off  Apply moisturizing cream or AmLactin to feet and legs  Pull up Navy stripe Edema wear  Change 2-3 times a week     Compression:   Your compression is [place] stripe [place] and can be removed at night and put back on first thing in the morning.   Please remove compression dressing if toes turn blue and/or tingle and can not be relieved by raising the leg for one hour. If unable to reapply in the morning, keep compression on until next dressing change.  It is recommended that you elevate your legs throughout the day: approx 2 times each day  Elevate them above the level of your heart for 30 min.   Ways to do this:   Lay on the couch or your bed and prop your legs up on pillows   Recline back as far as you can go in your recliner and prop your legs on pillows.     Doing these things will help reduce the edema in your legs.   Walk as much as you can, as you are able. Whenever you sit raise your ankle above your hips to promote wound healing.       Keep blood sugars below 150 and A1C below 7  Continue to  follow with Dr. Nabil Nash M.D. June 1, 2023    Call us at [phone] if you have any questions about your wounds, have redness or swelling around your wound, have a fever of 101 degrees Fahrenheit or greater or if you have any other problems or concerns. We answer the phone Monday through Friday 8 am to 4 pm, please leave a message as we check the voicemail frequently throughout the day.     If you had a positive experience please indicate that on your patient satisfaction survey form that [facility] will be sending you. It was a pleasure meeting with you today.  Thank you for allowing me and my team the privilege of caring for you today.  YOU are the reason we are here, and I truly hope we provided you with the excellent service you deserve.  Please let us know if there is anything else we can do for you so that we can be sure you are leaving completely satisfied with your care experience.      If you have any billing related questions please call the [facility] Business office at [phone]. The clinic staff does not handle billing related matters. If you are scheduled to have a follow up appointment, you will receive a reminder call the day before your visit. On the appointment day please arrive 15 minutes prior to your appointment time. If you are unable to keep that appointment, please call the clinic to cancel or reschedule. If you are more than 10 minutes late or greater for your scheduled appointment time, the clinic policy is that you may be asked to reschedule.

## 2023-06-01 NOTE — PROGRESS NOTES
Patient arrived for wound care visit. Certified Wound Care Nurse time spent evaluating patient record, completed a full evaluation and documented wound(s) & cyndy-wound skin; provided recommendation based on treatment plan. Applied dressing, reviewed discharge instructions, patient education, and discussed plan of care with appropriate medical team staff members and patient and/or family members.   Wounds are fairly healed and Patient will be discharged from Clinic; follow up education provided; all questions answered.

## 2023-06-01 NOTE — PROGRESS NOTES
Wound Clinic Note         Visit date: 06/01/2023       Cheif Complaint:     Parmjit Hernández is a 67 year old   male had concerns including WOUND CARE.  The patient has a left leg ulcer.          HISTORY OF PRESENT ILLNESS:    Parmjit Hernández reports the wound has been present since mid May 2023.  The wound began due to the area being bumped.   He also has a callus on his right first toe.    He reports the left leg wound is scabbed over and there is been no drainage from the area recently.  He has been bandaging the area with Aquaphor and a Mepilex bandage changed every few days.  He also uses an edema wear stocking to control his swelling.          The pateint denies fevers or chills.  They report the pain from the wound has been 0/10 and has remained about the same recently.      The patient reports they currently do not have any routine for elevating their legs.     Today the patient reports maintaining a regular diet without special attention to protein.        The patient denies a history of diabetes, smoking or chronic steroid use.         The patient has recently had some symptoms of wound infection and is currently taking antibiotics which they have been tolerating well with no symptoms of an adverse reaction.       Problem List:   Past Medical History:   Diagnosis Date     Acquired lymphedema 2/4/2019     Allergic state      Amyloidosis (H)     followed by hematology Dr. Romeo status post peripheral stem cell transplant     Anxiety 5/11/2016     Anxiety and depression 12/18/2013     Bipolar I disorder (H) 9/1/2015    Under care of psychiatrist [facility]- Dr Rahman doxepin 25 mg, 2 cap bedtime DULoxetine 20 mg once daily  OLANZapine 7.5 mg  1 tab bedtime      DDD (degenerative disc disease), lumbar 8/6/2020     Depressive disorder 1995     EKG abnormality 4/20/2020     H/O bone marrow transplant (H)      Headache(784.0)      History of diverticulitis 1/27/2015    1/15-rxed with antibiotics       Hyperlipidemia LDL goal <100 10/31/2010     Hypertension 2007     Hypertension goal BP (blood pressure) < 140/90 10/11/2011     Marianne (H) 8/20/2015     Morbid obesity (H) 8/28/2018     Myalgia and myositis, unspecified      Narcotic dependence (H) 9/6/2016    None in about 6 months- 8/1/17     NSTEMI (non-ST elevated myocardial infarction) (H) 04/19/2020     OCD (obsessive compulsive disorder)      Other acquired absence of organ 94     Other cirrhosis of liver (H) possibly from vences  4/15/2020     Other specified viral warts      Peripheral edema 5/20/2018    Noncardiac Continue with  furosemide (LASIX) 20 MG tablet,  potassium       chloride SA (K-DUR/KLOR-CON M) 10 MEQ CR  Tab once daily  Basic metabolic panel     Polyneuropathy associated with underlying disease (H) 2/4/2019     Restless legs syndrome (RLS) 6/29/2017     Stasis dermatitis of both legs 12/31/2018     Type 2 diabetes mellitus with other specified complication (H) 7/10/2017             Family Hx: family history includes Alcohol/Drug in his father, maternal grandmother, mother, and paternal grandfather; Allergies in his father and sister; Arthritis in his mother; Asthma in his father; C.A.D. in his father, maternal grandmother, and mother; Cerebrovascular Disease in his maternal grandfather, maternal grandmother, mother, paternal grandfather, and paternal grandmother; Circulatory in his father; Depression in his daughter, father, sister, and son; Diabetes in his brother; Gynecology in his sister; Heart Disease in his father; Hypertension in his father and mother; Psychotic Disorder in his son; Respiratory in his father.       Surgical Hx:   Past Surgical History:   Procedure Laterality Date     ABDOMEN SURGERY  2007    abdominal hernia     BACK SURGERY  8/6/2020     BIOPSY  june 2015    negative     BMT PROTOCOL      for systemic amyloidosis     BONE MARROW BIOPSY, BONE SPECIMEN, NEEDLE/TROCAR N/A 6/8/2016    Procedure: BIOPSY BONE MARROW;  Surgeon:  Nathan Agrawal MD;  Location:  GI     BONE MARROW BIOPSY, BONE SPECIMEN, NEEDLE/TROCAR N/A 2/20/2019    Procedure: BIOPSY BONE MARROW;  Surgeon: Michael Raygoza MD;  Location:  GI     CHOLECYSTECTOMY  1995    lap qian     COLONOSCOPY  2012    hx polyps     ESOPHAGOSCOPY, GASTROSCOPY, DUODENOSCOPY (EGD), COMBINED N/A 5/29/2015    Procedure: COMBINED ESOPHAGOSCOPY, GASTROSCOPY, DUODENOSCOPY (EGD), BIOPSY SINGLE OR MULTIPLE;  Surgeon: John Jacob MD;  Location:  GI     HERNIA REPAIR, UMBILICAL  2006     [facility] NONSPECIFIC PROCEDURE  94    Cholecystectomy     [facility] NONSPECIFIC PROCEDURE  2000    repair deviated septum          Allergies:    Allergies   Allergen Reactions     Cephalexin Diarrhea     Liraglutide Other (See Comments), Headache and Unknown     Other reaction(s): Headache, Unknown  PN: migraines - Increased migraine frequency and severity       Sulfa Antibiotics Angioedema and Swelling     Pt has taken  Taken sulfa drugs orally without trouble. He had problems with Sulfa eye drops. Eye swelled up                Medication History:    Current Outpatient Medications   Medication Sig     acetaminophen (TYLENOL) 325 MG tablet Take 2 tablets (650 mg) by mouth every 6 hours as needed for mild pain or other (and adjunct with moderate or severe pain or per patient request)     acetaminophen (TYLENOL) 500 MG tablet Take 1,000 mg by mouth every 8 hours as needed     ammonium lactate (AMLACTIN) 12 % external cream Apply topically daily To feet and legs daily & as needed     aspirin (ASPIRIN LOW DOSE) 81 MG tablet Take 1 tablet (81 mg) by mouth daily     atorvastatin (LIPITOR) 20 MG tablet Take 1.5 tablets (30 mg) by mouth daily     Bioflavonoid Products (CANDIDO-C) TABS Take 1,000 mg by mouth daily     blood glucose (NO BRAND SPECIFIED) lancets standard Use to test blood sugar 1 time daily or as directed.     blood glucose (NO BRAND SPECIFIED) test strip Use to test blood sugar 1 time  daily or as directed.     buPROPion (WELLBUTRIN XL) 150 MG 24 hr tablet Take 150 mg by mouth every morning     calcium carbonate (OS-LUIS) 1500 (600 Ca) MG tablet Take 600 mg by mouth daily     cariprazine (VRAYLAR) 4.5 MG capsule Take 4.5 mg by mouth daily     cefadroxil (DURICEF) 500 MG capsule Take 1 capsule (500 mg) by mouth 2 times daily     cyclobenzaprine (FLEXERIL) 10 MG tablet Take 1 tablet (10 mg) by mouth 3 times daily as needed for muscle spasms     doxycycline hyclate (VIBRAMYCIN) 100 MG capsule Take 1 capsule (100 mg) by mouth 2 times daily for 30 days     DULoxetine (CYMBALTA) 30 MG capsule Take 30 mg by mouth 2 times daily     Folic Acid-Vit B6-Vit B12 0.5-5-0.2 MG TABS Take 1 tablet by mouth daily     glucose 40 % (400 mg/mL) gel Take 15-30 g by mouth every 15 minutes as needed for low blood sugar     hydrochlorothiazide (HYDRODIURIL) 25 MG tablet Take 1.5 tablets (37.5 mg) by mouth daily     insulin glargine (LANTUS PEN) 100 UNIT/ML pen Inject 10 Units Subcutaneous At Bedtime     Lidocaine (LIDOCARE) 4 % Patch Place 1 patch onto the skin every 24 hours To prevent lidocaine toxicity, patient should be patch free for 12 hrs daily.     loperamide (IMODIUM) 2 MG capsule Take 1 capsule (2 mg) by mouth 4 times daily as needed for diarrhea     losartan (COZAAR) 100 MG tablet Take 1 tablet (100 mg) by mouth daily     medication given by implanted intrathecal pump continuous Drug # 1: Fentanyl (Sublimaze) - Conc: 2000 mcg/mL - Total Dose / 24 hours: 1663.3 mcg  Drug # 2: Bupivacaine (Marcaine)  - Conc: 20 mg/mL - Total Dose / 24 hours: 16.633 mg  Drug # 3: Morphine (Duramorph or Infumorph)  - Conc: 9 mg/mL - Total Dose / 24 hours: 7.484 mg    Rate: 0.0325 mL/hr  Pump Reservoir Volume: 40 mL  Pump Reservoir Alarm Volume: 2 ml  Outside Clinic & Provider: [facility]  Last Refill Date: 5/18/2023  Next Refill Date: 6/25/2023  [place] Alarm Date:  Pump : Medtronic  "SynchroMed II    Boluses: Up to 3 per day, 3 minute duration. Lock-out every 6 hours  Fentanyl: 99.9 mcg/dose  Bupivacaine: 0.999 mg/dose  Morphine: 0.4497 mg/dose     metFORMIN (GLUCOPHAGE XR) 500 MG 24 hr tablet Take 2 tablets (1,000 mg) by mouth 2 times daily (with meals)     metoprolol succinate ER (TOPROL XL) 50 MG 24 hr tablet Take 50 mg by mouth daily     metroNIDAZOLE (FLAGYL) 500 MG tablet Crush and sprinkle light layer over right leg, left leg and right toe wounds daily     modafinil (PROVIGIL) 200 MG tablet Take 1.5 tablets (300 mg) by mouth daily     multivitamin w/minerals (THERA-VIT-M) tablet Take 1 tablet by mouth daily Men's 50+     nitroGLYcerin (NITROSTAT) 0.4 MG sublingual tablet Place 0.4 mg under the tongue every 5 minutes as needed for chest pain For chest pain place 1 tablet under the tongue every 5 minutes for 3 doses. If symptoms persist 5 minutes after 1st dose call 911.     OVER-THE-COUNTER Take 3 capsules by mouth daily Supplement for brain health, unknown brand/ingredients     oxyCODONE-acetaminophen (PERCOCET) 5-325 MG tablet Take 1 tablet by mouth 2 times daily     POTASSIUM PO Take 1 tablet by mouth daily Unspecified tablet strength; patient estimated \"600 mg, +/- 200 mg\", takes for leg cramps     pregabalin (LYRICA) 100 MG capsule Take 100 mg by mouth daily as needed (In addition to 100mg 3 times per day scheduled)     pregabalin (LYRICA) 100 MG capsule Take 100 mg by mouth 3 times daily     senna-docusate (SENOKOT-S/PERICOLACE) 8.6-50 MG tablet Take 1-2 tablets by mouth 2 times daily At baseline, Take 1 tablet twice daily. May take second tablet if needed.     SUMAtriptan (IMITREX) 50 MG tablet Take 1 tablet (50 mg) by mouth at onset of headache for migraine May repeat in 2 hours. Max 4 tablets/24 hours.     tamsulosin (FLOMAX) 0.4 MG capsule Take 1 capsule (0.4 mg) by mouth 2 times daily     testosterone (ANDROGEL/TESTIM) 50 MG/5GM (1%) topical gel Place 50 mg of testosterone onto " the skin daily     vitamin B-Complex Take 3 tablets by mouth daily     vitamin D3 (CHOLECALCIFEROL) 1.25 MG (53103 UT) capsule Take 1 capsule (50,000 Units) by mouth every 7 days     No current facility-administered medications for this encounter.         Tobacco History:  reports that he has never smoked. He has never used smokeless tobacco.       REVIEW OF SYMPTOMS:   The review of systems was negative except as noted in the HPI.           PHYSICAL EXAMINATION:     BP (!) 168/100 (BP Location: Right arm, Patient Position: Right side, Cuff Size: Adult Large)   Pulse 81   Temp 98  F (36.7  C) (Temporal)            GENERAL: The patient overall appears well and is no acute distress.   HEAD: normocephalic   EYES: Sclera and conjunctiva clear   NECK: no obvious masses   LUNGS: breathing is unlabored.   EXTREMITIES: No clubbing, cyanosis or edema   SKIN: No rashes or other abnormalities except as noted under the Wound section below.   NEUROLOGICAL: normal motor and sensory function   EDEMA: Moderate       WOUND: The left leg wound is scabbed over.  The scab is densely adherent and dry.  There are no signs of infection and no drainage even with palpation.  The patient does have a fairly pronounced callus over the medial aspect of the right hallux.  With his verbal consent this callus was pared down.      Also see below for wound details:       Circumferential volume measures:             View : No data to display.                Ulceration(s)/Wound(s):   Please see the media tab under the chart review for pictures of the wounds.  Nursing staff removed dressings and cleansed wound.    Wound (used by [facility] only) 03/16/23 0911 Right lateral;lower leg ulceration, venous (Active)   Thickness/Stage full thickness 06/01/23 1100   Base dry;red;scab 06/01/23 1100   Periwound intact 06/01/23 1100   Periwound Temperature warm 06/01/23 1100   Periwound Skin Turgor soft 06/01/23 1100   Edges rolled/closed 06/01/23 1100   Length  (cm) 0.5 06/01/23 1100   Width (cm) 0.5 06/01/23 1100   Depth (cm) 0 06/01/23 1100   Wound (cm^2) 0.25 cm^2 06/01/23 1100   Wound Volume (cm^3) 0 cm^3 06/01/23 1100   Wound healing % 82.14 06/01/23 1100   Drainage Amount none 06/01/23 1100   Care, Wound non-select wound debridement performed 06/01/23 1100       Wound (used by [facility] only) 03/16/23 0911 Right 1 toe neuropathic ulcer (Active)   Thickness/Stage full thickness 06/01/23 1100   Base dry;scab 06/01/23 1100   Periwound dry 06/01/23 1100   Periwound Temperature warm 06/01/23 1100   Periwound Skin Turgor firm 06/01/23 1100   Edges callused 06/01/23 1100   Length (cm) 1 06/01/23 1100   Width (cm) 2 06/01/23 1100   Depth (cm) 0.3 06/01/23 1100   Wound (cm^2) 2 cm^2 06/01/23 1100   Wound Volume (cm^3) 0.6 cm^3 06/01/23 1100   Wound healing % -488.24 06/01/23 1100   Drainage Amount none 06/01/23 1100   Care, Wound debrided 06/01/23 1100       Wound (used by [facility] only) 05/10/23 1136 Left lateral;lower leg ulceration, venous (Active)   Thickness/Stage full thickness 06/01/23 1100   Base scab;red;dry 06/01/23 1100   Periwound intact 06/01/23 1100   Periwound Temperature warm 06/01/23 1100   Periwound Skin Turgor soft 06/01/23 1100   Edges rolled/closed 06/01/23 1100   Length (cm) 0.8 06/01/23 1100   Width (cm) 1.3 06/01/23 1100   Depth (cm) 0 06/01/23 1100   Wound (cm^2) 1.04 cm^2 06/01/23 1100   Wound Volume (cm^3) 0 cm^3 06/01/23 1100   Wound healing % 87 06/01/23 1100   Drainage Amount none 06/01/23 1100   Care, Wound non-select wound debridement performed 06/01/23 1100             Recent Labs   Lab Test 05/12/23  0634 02/23/23  1227 05/07/21  0741   A1C 8.8* 9.3* 5.8*          Recent Labs   Lab Test 05/31/23  1124 05/30/23  1510 05/19/23  0949   ALBUMIN 3.7 4.3 3.9              No debridement performed today.   However I did perform the callus paring as noted above.              ASSESSMENT:   This is a 67 year old  male with a left leg ulcer.           PLAN:    I recommended to continue to keep the scabbed over left leg wound covered with a Mepilex bandage and his edema wear stocking changed with bathing for another week or 2 until the scab falls off.  Then no further bandages will be required.  I have recommended that he continue to use his edema wear stockings to control his swelling.  I recommended that he make an appointment with a podiatrist for ongoing foot care.  I explained to the patient today that controlling the edema is probably the most important thing we can do to help heal the wound.  I have specially recommended that they lay down with their legs above the level of the heart for 30 minutes at least twice a day.   I have explained to the patient the importance of protein intake to wound healing.  I have explained that increasing protein intake will speed wound healing.  We discussed several types of food that are high in protein and the wound care nurse gave the patient a handout that summarizes this information.  In addition to further speed wound healing I have encouraged the patient to take a protein supplement.   The patient will return to the wound clinic on an as-needed basis if further wounds develop.        45 minutes spent on the date of the encounter doing chart review, history and exam, documentation and further activities per the note      Jose Nash MD  2023   11:57 AM   [facility] Vascular/Wound  [phone]    This note was electronically signed by Jose Nash MD        Further instructions from your care team       Parmjit Hernández      1955  A DME order was not completed because supplies were not needed    Medications/supplements to aid in healin. Vitamin D3 5,000 iu per day  2. Rosalia C 1,000 mg take daily  3. Vitamin B Complex with folic acid take 1 tablet daily   4. Vitamin B 12 1000 mcg daily  5. Lymphatic Formula 1000: Take one (1) 500mg capsule daily for the rest of your life.  6. 400mg  Magnesium Sulfate at bedtime to help leg pain     Antibiotics as prescribed + probiotics to help with diarrhea (take 2 hours before or 2 hours after antibiotics)     Follow up nail and callus care with Podiatry. Callus care is essential to preventing skin infections.     Wound care: Right 1st toe; Right lateral lower leg; Left lateral lower leg  Shower and cleanse both legs with mild unscented soap and water (such as Cetaphil, Cerave or Dove); rinse, pat dry  Cover scab to Left leg with Mepilex 4x4 silicone border dressing until scab falls off  Apply moisturizing cream or AmLactin to feet and legs  Pull up Navy stripe Edema wear  Change 2-3 times a week     Compression:   Your compression is Rad[place] stripe Edemawear and can be removed at night and put back on first thing in the morning.   Please remove compression dressing if toes turn blue and/or tingle and can not be relieved by raising the leg for one hour. If unable to reapply in the morning, keep compression on until next dressing change.  It is recommended that you elevate your legs throughout the day: approx 2 times each day  Elevate them above the level of your heart for 30 min.   Ways to do this:   Lay on the couch or your bed and prop your legs up on pillows   Recline back as far as you can go in your recliner and prop your legs on pillows.     Doing these things will help reduce the edema in your legs.   Walk as much as you can, as you are able. Whenever you sit raise your ankle above your hips to promote wound healing.       Keep blood sugars below 150 and A1C below 7  Continue to follow with Dr. Nabil Nash M.D. June 1, 2023    Call us at [phone] if you have any questions about your wounds, have redness or swelling around your wound, have a fever of 101 degrees Fahrenheit or greater or if you have any other problems or concerns. We answer the phone Monday through Friday 8 am to 4 pm, please leave a message as we check the  voicemail frequently throughout the day.     If you had a positive experience please indicate that on your patient satisfaction survey form that [facility] will be sending you. It was a pleasure meeting with you today.  Thank you for allowing me and my team the privilege of caring for you today.  YOU are the reason we are here, and I truly hope we provided you with the excellent service you deserve.  Please let us know if there is anything else we can do for you so that we can be sure you are leaving completely satisfied with your care experience.      If you have any billing related questions please call the [facility] Business office at [phone]. The clinic staff does not handle billing related matters. If you are scheduled to have a follow up appointment, you will receive a reminder call the day before your visit. On the appointment day please arrive 15 minutes prior to your appointment time. If you are unable to keep that appointment, please call the clinic to cancel or reschedule. If you are more than 10 minutes late or greater for your scheduled appointment time, the clinic policy is that you may be asked to reschedule.         ,

## 2023-06-05 ENCOUNTER — TELEPHONE (OUTPATIENT)
Dept: FAMILY MEDICINE | Facility: CLINIC | Age: 68
End: 2023-06-05
Payer: COMMERCIAL

## 2023-06-05 NOTE — TELEPHONE ENCOUNTER
Prior Authorization Retail Medication Request    Medication/Dose: Vraylar 4.5  ICD code (if different than what is on RX):  F31.9  Previously Tried and Failed:    Rationale:      Insurance Name:  [employer]  Insurance ID:  [id number]      Pharmacy Information (if different than what is on RX)  Name:  [id number] - [place], [place] - [address] AT [facility]  Phone:

## 2023-06-08 DIAGNOSIS — E11.69 TYPE 2 DIABETES MELLITUS WITH OTHER SPECIFIED COMPLICATION, WITHOUT LONG-TERM CURRENT USE OF INSULIN (H): ICD-10-CM

## 2023-06-08 DIAGNOSIS — G62.9 PERIPHERAL POLYNEUROPATHY: Primary | ICD-10-CM

## 2023-06-08 RX ORDER — PREGABALIN 100 MG/1
100 CAPSULE ORAL 3 TIMES DAILY
Qty: 30 CAPSULE | Refills: 0 | Status: SHIPPED | OUTPATIENT
Start: 2023-06-08 | End: 2023-07-05

## 2023-06-08 RX ORDER — PREGABALIN 100 MG/1
100 CAPSULE ORAL DAILY PRN
Qty: 30 CAPSULE | Refills: 0 | Status: SHIPPED | OUTPATIENT
Start: 2023-06-08 | End: 2023-07-05

## 2023-06-08 NOTE — TELEPHONE ENCOUNTER
DE,      Pregabalin:    Routing refill request to provider for review/approval because:  Medication is reported/historical  Last OV 4/17/23    Thank you,  Wandy Pena RN

## 2023-06-09 NOTE — TELEPHONE ENCOUNTER
Prior Authorization Approval    Medication: VRAYLAR 4.5 MG PO CAPS  Authorization Effective Date: 6/9/2023  Authorization Expiration Date: 12/31/2023  Insurance Company: [employer] ([facility]) - Phone [phone] Fax [phone]  Which Pharmacy is filling the prescription: [id number] - [place], [place] - [address], [place] AT [facility]  Pharmacy Notified: Yes  Patient Notified: Yes (pharmacy will notify patient when ready)    $504.64 for 30 day supply

## 2023-06-09 NOTE — TELEPHONE ENCOUNTER
Central Prior Authorization Team   Phone: [phone]      PA Initiation    Medication: VRAYLAR 4.5 MG PO CAPS  Insurance Company: [employer] ([facility]) - Phone [phone] Fax [phone]  Pharmacy Filling the Rx: [id number] - [place], [place] - [address], [place] AT [facility]  Filling Pharmacy Phone: [phone]  Filling Pharmacy Fax:    Start Date: 6/9/2023

## 2023-06-09 NOTE — TELEPHONE ENCOUNTER
Elma Samaniego  Up Prior Authorization Pool 4 minutes ago (12:41 PM)       FYI - PA has been approved. The copay is over $500 and pharmacy has to order it in.   Pharmacy states they will be sending out a message to the patient that it's going to be ordered.   Please close encounter when finished.   Thank you,   Elma

## 2023-06-15 ENCOUNTER — TRANSFERRED RECORDS (OUTPATIENT)
Dept: HEALTH INFORMATION MANAGEMENT | Facility: CLINIC | Age: 68
End: 2023-06-15

## 2023-06-15 ENCOUNTER — OFFICE VISIT (OUTPATIENT)
Dept: FAMILY MEDICINE | Facility: CLINIC | Age: 68
End: 2023-06-15
Payer: COMMERCIAL

## 2023-06-15 VITALS
SYSTOLIC BLOOD PRESSURE: 122 MMHG | HEIGHT: 66 IN | OXYGEN SATURATION: 94 % | RESPIRATION RATE: 18 BRPM | DIASTOLIC BLOOD PRESSURE: 79 MMHG | WEIGHT: 249.6 LBS | TEMPERATURE: 97.3 F | HEART RATE: 87 BPM | BODY MASS INDEX: 40.11 KG/M2

## 2023-06-15 DIAGNOSIS — Z94.84 HISTORY OF PERIPHERAL STEM CELL TRANSPLANT (H): ICD-10-CM

## 2023-06-15 DIAGNOSIS — I87.2 VENOUS STASIS DERMATITIS OF BOTH LOWER EXTREMITIES: ICD-10-CM

## 2023-06-15 DIAGNOSIS — G62.9 PERIPHERAL POLYNEUROPATHY: ICD-10-CM

## 2023-06-15 DIAGNOSIS — G61.0 GUILLAIN-BARRE SYNDROME (H): ICD-10-CM

## 2023-06-15 DIAGNOSIS — I10 HYPERTENSION GOAL BP (BLOOD PRESSURE) < 140/90: ICD-10-CM

## 2023-06-15 DIAGNOSIS — F31.9 BIPOLAR I DISORDER (H): ICD-10-CM

## 2023-06-15 DIAGNOSIS — R10.84 ABDOMINAL PAIN, GENERALIZED: ICD-10-CM

## 2023-06-15 DIAGNOSIS — F12.21 CANNABIS DEPENDENCE IN REMISSION (H): ICD-10-CM

## 2023-06-15 DIAGNOSIS — L03.115 CELLULITIS OF RIGHT LOWER EXTREMITY: ICD-10-CM

## 2023-06-15 DIAGNOSIS — D61.818 ACQUIRED PANCYTOPENIA (H): ICD-10-CM

## 2023-06-15 DIAGNOSIS — E55.9 VITAMIN D DEFICIENCY: ICD-10-CM

## 2023-06-15 DIAGNOSIS — Z09 HOSPITAL DISCHARGE FOLLOW-UP: Primary | ICD-10-CM

## 2023-06-15 DIAGNOSIS — G47.33 OBSTRUCTIVE SLEEP APNEA SYNDROME: ICD-10-CM

## 2023-06-15 DIAGNOSIS — E11.69 TYPE 2 DIABETES MELLITUS WITH OTHER SPECIFIED COMPLICATION, WITHOUT LONG-TERM CURRENT USE OF INSULIN (H): ICD-10-CM

## 2023-06-15 DIAGNOSIS — G89.4 CHRONIC PAIN SYNDROME: ICD-10-CM

## 2023-06-15 DIAGNOSIS — F31.76 DEPRESSED BIPOLAR I DISORDER IN FULL REMISSION (H): ICD-10-CM

## 2023-06-15 DIAGNOSIS — I73.9 PERIPHERAL VASCULAR DISEASE (H): ICD-10-CM

## 2023-06-15 DIAGNOSIS — E66.01 MORBID OBESITY (H): ICD-10-CM

## 2023-06-15 DIAGNOSIS — I63.9 CEREBRAL INFARCTION, UNSPECIFIED MECHANISM (H): ICD-10-CM

## 2023-06-15 LAB — RETINOPATHY: NEGATIVE

## 2023-06-15 PROCEDURE — 99214 OFFICE O/P EST MOD 30 MIN: CPT | Performed by: FAMILY MEDICINE

## 2023-06-15 RX ORDER — CHOLECALCIFEROL (VITAMIN D3) 50 MCG
1 TABLET ORAL DAILY
Qty: 90 TABLET | Refills: 3 | Status: SHIPPED | OUTPATIENT
Start: 2023-06-15

## 2023-06-15 ASSESSMENT — ANXIETY QUESTIONNAIRES
6. BECOMING EASILY ANNOYED OR IRRITABLE: SEVERAL DAYS
5. BEING SO RESTLESS THAT IT IS HARD TO SIT STILL: NOT AT ALL
GAD7 TOTAL SCORE: 4
3. WORRYING TOO MUCH ABOUT DIFFERENT THINGS: SEVERAL DAYS
7. FEELING AFRAID AS IF SOMETHING AWFUL MIGHT HAPPEN: NOT AT ALL
4. TROUBLE RELAXING: NOT AT ALL
7. FEELING AFRAID AS IF SOMETHING AWFUL MIGHT HAPPEN: NOT AT ALL
2. NOT BEING ABLE TO STOP OR CONTROL WORRYING: SEVERAL DAYS
8. IF YOU CHECKED OFF ANY PROBLEMS, HOW DIFFICULT HAVE THESE MADE IT FOR YOU TO DO YOUR WORK, TAKE CARE OF THINGS AT HOME, OR GET ALONG WITH OTHER PEOPLE?: SOMEWHAT DIFFICULT
1. FEELING NERVOUS, ANXIOUS, OR ON EDGE: SEVERAL DAYS
GAD7 TOTAL SCORE: 4
IF YOU CHECKED OFF ANY PROBLEMS ON THIS QUESTIONNAIRE, HOW DIFFICULT HAVE THESE PROBLEMS MADE IT FOR YOU TO DO YOUR WORK, TAKE CARE OF THINGS AT HOME, OR GET ALONG WITH OTHER PEOPLE: SOMEWHAT DIFFICULT
GAD7 TOTAL SCORE: 4

## 2023-06-15 ASSESSMENT — PATIENT HEALTH QUESTIONNAIRE - PHQ9
SUM OF ALL RESPONSES TO PHQ QUESTIONS 1-9: 7
10. IF YOU CHECKED OFF ANY PROBLEMS, HOW DIFFICULT HAVE THESE PROBLEMS MADE IT FOR YOU TO DO YOUR WORK, TAKE CARE OF THINGS AT HOME, OR GET ALONG WITH OTHER PEOPLE: SOMEWHAT DIFFICULT
SUM OF ALL RESPONSES TO PHQ QUESTIONS 1-9: 7

## 2023-06-15 ASSESSMENT — PAIN SCALES - GENERAL: PAINLEVEL: MODERATE PAIN (4)

## 2023-06-15 NOTE — PROGRESS NOTES
Assessment & Plan     Hospital discharge follow-up    He reports hematuria since his recent hospital admission.  The pain symptoms resolved.  The lab results were also significant improved by discharge.  He reports that the cellulitis issue that he struggled with in his right leg has also improved.  He is not having fevers or any other signs of infection at this time.  He continues to follow with vascular specialist.  He is still on antibiotic and topical metronidazole.  He will continue to follow with his other specialist including the pain clinic, psychiatry and neurology.  He reports of difficulty getting regular covered by his insurance.  He is working with his insurance plan on this.  I recommend he schedule follow-up appointment to recheck diabetes labs in 2 months.  His hemoglobin A1c was still high at 8.8% last month.  I recommended he continue take metformin and to start taking Lantus regularly.  He reports frequent missing this dose.    Abdominal pain, generalized    Cellulitis of right lower extremity    Peripheral vascular disease (H)    Venous stasis dermatitis of both lower extremities    Type 2 diabetes mellitus with other specified complication, without long-term current use of insulin (H)    Peripheral polyneuropathy    Hypertension goal BP (blood pressure) < 140/90    Bipolar I disorder (H)    Depressed bipolar I disorder in full remission (H)    Morbid obesity (H)    Acquired pancytopenia (H)    Cannabis dependence in remission (H)    History of peripheral stem cell transplant (H)    Guillain-Wappapello syndrome (H)    Obstructive sleep apnea syndrome    Cerebral infarction, unspecified mechanism (H)    Vitamin D deficiency  - vitamin D3 (CHOLECALCIFEROL) 50 mcg (2000 units) tablet; Take 1 tablet (50 mcg) by mouth daily    Chronic pain syndrome               MED REC REQUIRED  Post Medication Reconciliation Status:  Discharge medications reconciled, continue medications without change        Sherwin ZAMORA  Radha Mcdermott DO  [facility] [place]    Lavinia Almendarez is a 67 year old, presenting for the following health issues:  Hospital F/U        6/15/2023    10:07 AM   Additional Questions   Roomed by Vandana RINCON     History of Present Illness       Reason for visit:  Hospital follow-up; medication question    He eats 0-1 servings of fruits and vegetables daily.He consumes 0 sweetened beverage(s) daily.He exercises with enough effort to increase his heart rate 9 or less minutes per day.  He exercises with enough effort to increase his heart rate 3 or less days per week. He is missing 2 dose(s) of medications per week.    Today's PHQ-9         PHQ-9 Total Score: 7    PHQ-9 Q9 Thoughts of better off dead/self-harm past 2 weeks :   Not at all    How difficult have these problems made it for you to do your work, take care of things at home, or get along with other people: Somewhat difficult  Today's NANCY-7 Score: 4         Hospital Follow-up Visit:    Hospital/Nursing Home/ Rehab Facility: [facility]  Date of Admission: 5/30/23  Date of Discharge: 5/31/23  Reason(s) for Admission: Nausea/Vomiting, Abdominal Pain    Was your hospitalization related to COVID-19? No   Problems taking medications regularly:  None  Medication changes since discharge: None  Problems adhering to non-medication therapy:  None    Summary of hospitalization:  [facility] discharge summary reviewed  Diagnostic Tests/Treatments reviewed.  Follow up needed: none  Other Healthcare Providers Involved in Patient s Care:         None  Update since discharge: improved.         Plan of care communicated with patient    He was admitted to the hospital 5/30/2023 - 5/31/2023 for nausea, vomiting and abdominal pain.  His work-up revealed leukocytosis with a white blood cell count of 11.9.  By discharge his white blood cell count came down to 8.6.  He had a CT scan of the abdomen and pelvis which showed no  "acute findings.  He has known hepatic steatosis with findings suggestive of cirrhosis and early fibrosis.  The LFTs were mildly elevated upon admission, but normalized by discharge.    He has a complex medical history significant for poorly controlled type 2 diabetes, peripheral neuropathy, peripheral vascular disease, coronary artery disease, history of NSTEMI with stent placement, amyloidosis, history of peripheral stem cell transplant, thrombocytopenia, obesity, fatty cirrhosis of the liver, bipolar disorder, migraine headaches, chronic pain syndrome, hypertension, hyperlipidemia and obstructive sleep apnea.  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    On 5/12/2023 his hemoglobin A1c was 8.8%.  He admits that he does not always remember to take the Lantus.  He is also on metformin.                   Review of Systems   Constitutional, HEENT, cardiovascular, pulmonary, GI, , musculoskeletal, neuro, skin, endocrine and psych systems are negative, except as otherwise noted.      Objective    /79   Pulse 87   Temp 97.3  F (36.3  C) (Temporal)   Resp 18   Ht 1.683 m (5' 6.25\")   Wt 113.2 kg (249 lb 9.6 oz)   SpO2 94%   BMI 39.98 kg/m    Body mass index is 39.98 kg/m .  Physical Exam   GENERAL: healthy, alert and no distress  EYES: Eyes grossly normal to inspection, PERRL and conjunctivae and sclerae normal  HENT:  nose and mouth without ulcers or lesions  NECK: no adenopathy, no asymmetry, masses, or scars and thyroid normal to palpation  RESP: lungs clear to auscultation - no rales, rhonchi or wheezes  CV: regular rate and rhythm, normal S1 S2, no S3 or S4, no murmur, click or rub, no peripheral edema and peripheral pulses strong  ABDOMEN: soft, nontender, no hepatosplenomegaly, no masses and bowel sounds normal  MS: 1+ lower EXTR edema.  There are some chronic disease changes and mild erythema bilaterally.  SKIN: no " suspicious lesions or rashes  NEURO: Normal strength and tone, mentation intact and speech normal  PSYCH: mentation appears normal, affect normal/bright

## 2023-06-19 NOTE — PROGRESS NOTES
Medication Therapy Management (MTM) Encounter    ASSESSMENT:                            Medication Adherence/Access: Would benefit from taking medications regularly    Type 2 Diabetes: Patient is not meeting A1c goal of < 7%. Would benefit from checking blood sugars. May benefit from GLP-1 agonist, will check with patient assistance program to see if he would qualify. Patient following up with Dr. Garcia in a month for diabetes.     Hyperlipidemia: Stable    Hypertension/CAD: Blood pressure elevated in clinic today. May due to increased stress. Recommend follow up blood pressure check with Dr. Garcia. Would benefit from checking blood pressures at home.     Mental Health: Would benefit from working with patient assistance program.     Neuropathy: Stable    Pain: Stable    Ulcer: Stable    CFS: would benefit from reaching out to psychiatry for modafanil refill.    BPH: Stable    Constipation/Diarrhea: Stable    Supplements: Stable    Migraine: Stable    Low Testosterone: Stable    PLAN:                            1. Check blood sugars consistently; check with patient assistance program if Trulicity or Ozempic would be covered.  2. Check blood pressures at home. Call the clinic if blood pressures consistently >140/90mmHg.  3. Call patient assistance program for Vraylar coverage and possibly emergency fund. If you do not qualify, contact Lara at [facility] for an alternative to Vraylar  4. Contact Lara at [facility] for refill on modafanil    Follow-up: patient declines follow up with MTM at this time.    SUBJECTIVE/OBJECTIVE:                          Erickson Hernández is a 67 year old male coming in for a transitions of care visit. He was discharged from [facility] on 5/31/2023 for nausea, vomiting, abdominal pain.   In the hospital 5 times in the last 6 weeks for cellulitis, fell in hospital   Patient moved to [facility] close to his home.    Reason for visit: Medication Review.  Vraylar $504/month  used to be $50 when he was in [place] (needs something under $50/month)  Ran out of modafanil-psychiatry has been filling    Lara Cutlar-psychiatry at [facility].    Allergies/ADRs: Reviewed in chart  Past Medical History: Reviewed in chart  Tobacco: He reports that he has never smoked. He has never used smokeless tobacco.  Alcohol: 4-6 beverages / week    Medication Adherence/Access: has missed 2 out of 7 days. He has own system-pill boxes are not big enough for his medications.     Type 2 Diabetes:    Metformin XR 1000mg twice daily  Lantus 10 units at bedtime (was started at one of his hospitalizations)  Glucose Gel 15-30 grams every 15 minutes as needed for low blood sugar  Trulicity was prescribed by Dr. Garcia but when when he was in the hospital he was told not to take Trulicity  Aspirin: Taking 81mg daily and denies side effects   Blood sugar monitoring: has not been testing. Just got strips and his life has been stressful and hasn't been testing.  Diet/Exercise: Trying to cut back on sugar. There was a time when he was having 1-2 milkshakes/day from [employer]. He stopped eating these a month ago.   Eye exam: up to date  Foot exam: up to date  Urine Albumin:   Lab Results   Component Value Date    UMALCR 267.00 (H) 02/23/2023      Lab Results   Component Value Date    A1C 8.8 (H) 05/12/2023   ]  Hypertension/CAD:   Hydrochlorothiazide 37.5mg daily  Losartan 100mg daily  Metoprolol XL 50mg daily  Nitroglycerin as needed-has not had to take and bottle is with date  Atorvastatin 30mg daily    Recent Labs   Lab Test 02/23/23  1227 07/23/21  0831 06/23/16  0000 09/01/15  1201   CHOL 153 112   < > 195   HDL 35* 32*   < > 33*   LDL 79 53   < > 140*   TRIG 197* 133   < > 109   CHOLHDLRATIO  --   --   --  5.9*    < > = values in this interval not displayed.     Patient does not self-monitor blood pressure but will start again.  Patient reports no current medication side effects.  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 Also his social security check is late. Saw provider at [facility] last week. He is looking for a therapist.    Has not taken Abilify or seroquel  Zyprexa did not work     Neuropathy/Fibromylagia  Pregabalin 100mg three times daily + 100mg daily as needed-is very helpful  Duloxetine 30mg twice daily  Vitamin B complex    Denies side effects.     Pain:  Acetaminophen 1000mg up to three times a day. If he is taking oxycodone/APAP will only take 325mg of acetaminophen. Is aware of the limit  Cyclobenzaprine 10mg three times daily as needed (takes at bedtime)  Lidocaine 4% patch every 24 hous  Oxycodone/APAP 1 tablet twice daily  Pain pump for stomach  Pain is as controlled as it can be.    CFS:   Modafinil 300mg daily-run out of refills.    BPH:   Tamsulosin 0.4mg twice daily  When pain pump was put in it destroyed his nerves.   Symptoms better controlled with tamsulosin than without it.  Denies side effects.     Constipation/Diarrhea:  Senna S 1-2 tablets twice daily, may take an additional tablet twice daily-uses this with his pain pump  Loperamide 2mg four times daily as needed (mainly used when he was on cephalexin)    Supplements:   Vitamin D 50mcg daily  Multivitamin daily  Potassium daily-takes for muscle cramps and finds it effective  Neuriva daily takes   No reported issues at this time.     Migraine:  Sumatriptan 50mg at onset of migraine. Has taken 1 in the last 2 years.     Low Testosterone:   Androgel 50mg onto skin once daily on left arm     Cellulitis:   Cefadroxil 500mg twice daily for 7 days. Is almost finished.  Metronidazole sprinkled on toe   Amlactin on legs  is  improving.    Today's Vitals: BP (!) 173/95   Pulse 73   Wt 246 lb 9.6 oz (111.9 kg)   BMI 39.50 kg/m    ----------------  Post Discharge Medication Reconciliation Status: discharge medications reconciled, continue medications without change.    I spent 60 minutes with this patient today.  A copy of the visit note was provided to the patient's provider(s).    A summary of these recommendations was sent via [employer].    Anjelica Mcwilliams, Pharm.D, BCACP  Medication Therapy Management Pharmacist         Medication Therapy Recommendations  Type 2 diabetes mellitus (H)    Current Medication: blood glucose (NO BRAND SPECIFIED) test strip   Rationale: Frequency inappropriate - Dosage too low - Effectiveness   Recommendation: Increase Frequency   Status: Patient Agreed - Adherence/Education

## 2023-06-20 ENCOUNTER — OFFICE VISIT (OUTPATIENT)
Dept: PHARMACY | Facility: CLINIC | Age: 68
End: 2023-06-20
Payer: COMMERCIAL

## 2023-06-20 VITALS
BODY MASS INDEX: 39.5 KG/M2 | SYSTOLIC BLOOD PRESSURE: 173 MMHG | DIASTOLIC BLOOD PRESSURE: 95 MMHG | HEART RATE: 73 BPM | WEIGHT: 246.6 LBS

## 2023-06-20 DIAGNOSIS — E11.40 TYPE 2 DIABETES MELLITUS WITH DIABETIC NEUROPATHY, WITH LONG-TERM CURRENT USE OF INSULIN (H): ICD-10-CM

## 2023-06-20 DIAGNOSIS — K59.03 DRUG-INDUCED CONSTIPATION: ICD-10-CM

## 2023-06-20 DIAGNOSIS — I25.10 CORONARY ARTERY DISEASE INVOLVING NATIVE HEART WITHOUT ANGINA PECTORIS, UNSPECIFIED VESSEL OR LESION TYPE: ICD-10-CM

## 2023-06-20 DIAGNOSIS — R53.82 CHRONIC FATIGUE, UNSPECIFIED: ICD-10-CM

## 2023-06-20 DIAGNOSIS — E11.69 DISORDER OF NERVOUS SYSTEM DUE TO TYPE 2 DIABETES MELLITUS (H): ICD-10-CM

## 2023-06-20 DIAGNOSIS — L03.115 CELLULITIS OF RIGHT LOWER EXTREMITY: Primary | ICD-10-CM

## 2023-06-20 DIAGNOSIS — R79.89 DECREASED TESTOSTERONE LEVEL: ICD-10-CM

## 2023-06-20 DIAGNOSIS — G43.919 INTRACTABLE MIGRAINE WITHOUT STATUS MIGRAINOSUS, UNSPECIFIED MIGRAINE TYPE: ICD-10-CM

## 2023-06-20 DIAGNOSIS — G98.8 DISORDER OF NERVOUS SYSTEM DUE TO TYPE 2 DIABETES MELLITUS (H): ICD-10-CM

## 2023-06-20 DIAGNOSIS — M79.7 FIBROMYALGIA: ICD-10-CM

## 2023-06-20 DIAGNOSIS — I10 BENIGN ESSENTIAL HYPERTENSION: ICD-10-CM

## 2023-06-20 DIAGNOSIS — Z79.4 TYPE 2 DIABETES MELLITUS WITH DIABETIC NEUROPATHY, WITH LONG-TERM CURRENT USE OF INSULIN (H): ICD-10-CM

## 2023-06-20 DIAGNOSIS — N40.0 BENIGN PROSTATIC HYPERPLASIA, UNSPECIFIED WHETHER LOWER URINARY TRACT SYMPTOMS PRESENT: ICD-10-CM

## 2023-06-20 DIAGNOSIS — K52.9 CHRONIC DIARRHEA: ICD-10-CM

## 2023-06-20 DIAGNOSIS — F31.11 BIPOLAR 1 DISORDER, MANIC, MILD (H): ICD-10-CM

## 2023-06-20 DIAGNOSIS — G89.29 OTHER CHRONIC PAIN: ICD-10-CM

## 2023-06-20 DIAGNOSIS — Z78.9 TAKES DIETARY SUPPLEMENTS: ICD-10-CM

## 2023-06-20 PROCEDURE — 99207 PR NO CHARGE LOS: CPT | Performed by: PHARMACIST

## 2023-06-20 NOTE — PATIENT INSTRUCTIONS
"Recommendations from today's MTM visit:                                                       Contact the [place] Prescription Assistance Program/Fund (Barb Huerta) at [phone] for Vraylar    1. Check blood sugars consistently; check with patient assistance program if Trulicity or Ozempic would be covered.  2. Check blood pressures at home. Call the clinic if blood pressures consistently >140/90mmHg.  3. Call patient assistance program for Vraylar coverage and possibly emergency fund. If you do not qualify, contact Lara at [facility] for an alternative to Vraylar  4. Contact Lara at [facility] for refill on modafanil    Follow-up: as needed    It was great speaking with you today.  I value your experience and would be very thankful for your time in providing feedback in our clinic survey. In the next few days, you may receive an email or text message from [employer] with a link to a survey related to your  clinical pharmacist.\"     To schedule another MTM appointment, please call the clinic directly or you may call the MTM scheduling line at [phone] or toll-free at [phone].     My Clinical Pharmacist's contact information:                                                      Please feel free to contact me with any questions or concerns you have.     Anjelica Mcwilliams, Pharm.D, BCACP  Medication Therapy Management Pharmacist     "

## 2023-06-28 ENCOUNTER — TELEPHONE (OUTPATIENT)
Dept: FAMILY MEDICINE | Facility: CLINIC | Age: 68
End: 2023-06-28
Payer: COMMERCIAL

## 2023-06-28 NOTE — TELEPHONE ENCOUNTER
June 27, 2023 I spoke with Erickson, he is in need of financial assistance for medication.    We reviewed the Pharmacy Assistance Fund $500, the Prescription Assistance Program for manfacturer assistance programs, gross income, insurance and Rx list.     assistance applications will be completed for Vraylar, Cymbalta & Lantus pens.    When approved, Erickson will receive this medication at no cost through December 2023.    I will also be sending Patient [employer] and [employer] co-pay assistance information for additional medications.    Barb Huerta  Prescription Assistance Supervisor  Pharmacy Assistance  [phone]

## 2023-06-28 NOTE — TELEPHONE ENCOUNTER
FYI~ I have approved Erickson for up to $500 of the Pharmacy Assistance Fund to be filled at the [facility].    Erickson and the [facility] have been informed.    Barb Huerta  Prescription Assistance Supervisor  Pharmacy Assistance  [phone]

## 2023-07-03 ENCOUNTER — TELEPHONE (OUTPATIENT)
Dept: FAMILY MEDICINE | Facility: CLINIC | Age: 68
End: 2023-07-03
Payer: COMMERCIAL

## 2023-07-03 DIAGNOSIS — R33.9 URINARY RETENTION WITH INCOMPLETE BLADDER EMPTYING: ICD-10-CM

## 2023-07-03 DIAGNOSIS — G47.33 OBSTRUCTIVE SLEEP APNEA SYNDROME: ICD-10-CM

## 2023-07-03 RX ORDER — MODAFINIL 200 MG/1
300 TABLET ORAL DAILY
Qty: 30 TABLET | Refills: 0 | Status: SHIPPED | OUTPATIENT
Start: 2023-07-03 | End: 2023-08-08

## 2023-07-03 RX ORDER — TAMSULOSIN HYDROCHLORIDE 0.4 MG/1
0.4 CAPSULE ORAL 2 TIMES DAILY
Qty: 60 CAPSULE | Refills: 3 | OUTPATIENT
Start: 2023-07-03

## 2023-07-03 NOTE — TELEPHONE ENCOUNTER
Prior Authorization Retail Medication Request    Medication/Dose: Modafinil 200mg  ICD code (if different than what is on RX):  n/a  Previously Tried and Failed:  n/a  Rationale:  n/a    Insurance Name:  [facility] Part D [id number]  Insurance ID:  [id number]      Pharmacy Information (if different than what is on RX)  Name:  [facility] [place]  Phone:  [phone]

## 2023-07-05 ENCOUNTER — TELEPHONE (OUTPATIENT)
Dept: FAMILY MEDICINE | Facility: CLINIC | Age: 68
End: 2023-07-05
Payer: COMMERCIAL

## 2023-07-05 DIAGNOSIS — E78.5 HYPERLIPIDEMIA LDL GOAL <100: ICD-10-CM

## 2023-07-05 DIAGNOSIS — R33.9 URINARY RETENTION WITH INCOMPLETE BLADDER EMPTYING: ICD-10-CM

## 2023-07-05 DIAGNOSIS — I10 BENIGN ESSENTIAL HYPERTENSION: ICD-10-CM

## 2023-07-05 DIAGNOSIS — E11.9 TYPE 2 DIABETES MELLITUS WITHOUT COMPLICATION, UNSPECIFIED WHETHER LONG TERM INSULIN USE (H): ICD-10-CM

## 2023-07-05 DIAGNOSIS — I10 HYPERTENSION GOAL BP (BLOOD PRESSURE) < 140/90: ICD-10-CM

## 2023-07-05 DIAGNOSIS — F31.9 BIPOLAR I DISORDER (H): ICD-10-CM

## 2023-07-05 DIAGNOSIS — G62.9 PERIPHERAL POLYNEUROPATHY: ICD-10-CM

## 2023-07-05 DIAGNOSIS — E11.69 TYPE 2 DIABETES MELLITUS WITH OTHER SPECIFIED COMPLICATION, WITHOUT LONG-TERM CURRENT USE OF INSULIN (H): ICD-10-CM

## 2023-07-05 DIAGNOSIS — F31.76 DEPRESSED BIPOLAR I DISORDER IN FULL REMISSION (H): Primary | ICD-10-CM

## 2023-07-05 RX ORDER — LOSARTAN POTASSIUM 100 MG/1
100 TABLET ORAL DAILY
Qty: 90 TABLET | Refills: 3 | Status: SHIPPED | OUTPATIENT
Start: 2023-07-05 | End: 2024-07-12

## 2023-07-05 RX ORDER — PREGABALIN 100 MG/1
100 CAPSULE ORAL DAILY PRN
Qty: 30 CAPSULE | Refills: 2 | Status: ON HOLD | OUTPATIENT
Start: 2023-07-05 | End: 2023-12-26

## 2023-07-05 RX ORDER — ATORVASTATIN CALCIUM 20 MG/1
30 TABLET, FILM COATED ORAL DAILY
Qty: 135 TABLET | Refills: 3 | Status: SHIPPED | OUTPATIENT
Start: 2023-07-05 | End: 2024-07-12

## 2023-07-05 RX ORDER — TAMSULOSIN HYDROCHLORIDE 0.4 MG/1
0.4 CAPSULE ORAL 2 TIMES DAILY
Qty: 180 CAPSULE | Refills: 3 | Status: SHIPPED | OUTPATIENT
Start: 2023-07-05 | End: 2024-09-26

## 2023-07-05 RX ORDER — METOPROLOL SUCCINATE 50 MG/1
50 TABLET, EXTENDED RELEASE ORAL DAILY
Qty: 90 TABLET | Refills: 3 | Status: SHIPPED | OUTPATIENT
Start: 2023-07-05 | End: 2023-07-13

## 2023-07-05 RX ORDER — PREGABALIN 100 MG/1
100 CAPSULE ORAL 3 TIMES DAILY
Qty: 30 CAPSULE | Refills: 2 | Status: ON HOLD | OUTPATIENT
Start: 2023-07-05 | End: 2023-12-26

## 2023-07-05 RX ORDER — DULOXETIN HYDROCHLORIDE 30 MG/1
30 CAPSULE, DELAYED RELEASE ORAL 2 TIMES DAILY
Qty: 180 CAPSULE | Refills: 3 | Status: SHIPPED | OUTPATIENT
Start: 2023-07-05 | End: 2023-08-07

## 2023-07-05 RX ORDER — METFORMIN HCL 500 MG
1000 TABLET, EXTENDED RELEASE 24 HR ORAL 2 TIMES DAILY WITH MEALS
Qty: 360 TABLET | Refills: 3 | Status: SHIPPED | OUTPATIENT
Start: 2023-07-05

## 2023-07-05 RX ORDER — BUPROPION HYDROCHLORIDE 150 MG/1
150 TABLET ORAL EVERY MORNING
Qty: 90 TABLET | Refills: 3 | Status: SHIPPED | OUTPATIENT
Start: 2023-07-05

## 2023-07-05 RX ORDER — HYDROCHLOROTHIAZIDE 25 MG/1
37.5 TABLET ORAL DAILY
Qty: 135 TABLET | Refills: 3 | Status: ON HOLD | OUTPATIENT
Start: 2023-07-05 | End: 2023-09-27

## 2023-07-05 NOTE — TELEPHONE ENCOUNTER
Can you please send all of patient's medications over to the [facility] pharmacy? He was approved for $500 for the patient assistance fund and we are having a hard time getting these sent over from [facility]. Thank you!  Purvi Rodrigues, PharmD   Pharmacy manager [facility]  [phone]

## 2023-07-07 NOTE — TELEPHONE ENCOUNTER
Prior Authorization Approval    Authorization Effective Date: 7/7/2023  Authorization Expiration Date: 1/2/2024  Medication: Modafinil 200mg  Approved Dose/Quantity:    Reference #:     Insurance Company: [employer] ([facility]) - Phone [phone] Fax [phone]  Expected CoPay:       CoPay Card Available:      Foundation Assistance Needed:    Which Pharmacy is filling the prescription (Not needed for infusion/clinic administered): [facility] - [place], [place] - [address], [address]  Pharmacy Notified: Yes  Patient Notified: Yes  **Instructed pharmacy to notify patient when script is ready to /ship.**

## 2023-07-07 NOTE — TELEPHONE ENCOUNTER
Central Prior Authorization Team   Phone: [phone]    PA Initiation    Medication: Modafinil 200mg  Insurance Company: [employer] ([facility]) - Phone [phone] Fax [phone]  Pharmacy Filling the Rx: [facility] [place], [place] - [address], [address]  Filling Pharmacy Phone: [phone]  Filling Pharmacy Fax:    Start Date: 7/7/2023

## 2023-07-13 ENCOUNTER — OFFICE VISIT (OUTPATIENT)
Dept: FAMILY MEDICINE | Facility: CLINIC | Age: 68
End: 2023-07-13
Payer: COMMERCIAL

## 2023-07-13 VITALS
RESPIRATION RATE: 16 BRPM | OXYGEN SATURATION: 92 % | TEMPERATURE: 96.9 F | DIASTOLIC BLOOD PRESSURE: 86 MMHG | HEART RATE: 88 BPM | WEIGHT: 252.3 LBS | HEIGHT: 67 IN | SYSTOLIC BLOOD PRESSURE: 158 MMHG | BODY MASS INDEX: 39.6 KG/M2

## 2023-07-13 DIAGNOSIS — E78.5 HYPERLIPIDEMIA LDL GOAL <100: ICD-10-CM

## 2023-07-13 DIAGNOSIS — M48.061 SPINAL STENOSIS AT L4-L5 LEVEL: ICD-10-CM

## 2023-07-13 DIAGNOSIS — I10 BENIGN ESSENTIAL HYPERTENSION: ICD-10-CM

## 2023-07-13 DIAGNOSIS — R33.9 URINARY RETENTION WITH INCOMPLETE BLADDER EMPTYING: ICD-10-CM

## 2023-07-13 DIAGNOSIS — I21.4 NSTEMI (NON-ST ELEVATED MYOCARDIAL INFARCTION) (H): ICD-10-CM

## 2023-07-13 DIAGNOSIS — G47.33 OBSTRUCTIVE SLEEP APNEA SYNDROME: ICD-10-CM

## 2023-07-13 DIAGNOSIS — E11.9 TYPE 2 DIABETES MELLITUS WITHOUT COMPLICATION, UNSPECIFIED WHETHER LONG TERM INSULIN USE (H): Primary | ICD-10-CM

## 2023-07-13 DIAGNOSIS — G62.9 PERIPHERAL POLYNEUROPATHY: ICD-10-CM

## 2023-07-13 PROCEDURE — 99214 OFFICE O/P EST MOD 30 MIN: CPT | Performed by: FAMILY MEDICINE

## 2023-07-13 RX ORDER — METOPROLOL SUCCINATE 100 MG/1
100 TABLET, EXTENDED RELEASE ORAL DAILY
Qty: 90 TABLET | Refills: 3 | Status: SHIPPED | OUTPATIENT
Start: 2023-07-13 | End: 2024-09-04

## 2023-07-13 RX ORDER — CYCLOBENZAPRINE HCL 10 MG
10 TABLET ORAL 3 TIMES DAILY PRN
Qty: 60 TABLET | Refills: 3 | Status: SHIPPED | OUTPATIENT
Start: 2023-07-13 | End: 2023-12-24

## 2023-07-13 RX ORDER — NITROGLYCERIN 0.4 MG/1
0.4 TABLET SUBLINGUAL EVERY 5 MIN PRN
Qty: 30 TABLET | Refills: 1 | Status: SHIPPED | OUTPATIENT
Start: 2023-07-13

## 2023-07-13 ASSESSMENT — ANXIETY QUESTIONNAIRES
1. FEELING NERVOUS, ANXIOUS, OR ON EDGE: SEVERAL DAYS
GAD7 TOTAL SCORE: 4
GAD7 TOTAL SCORE: 4
IF YOU CHECKED OFF ANY PROBLEMS ON THIS QUESTIONNAIRE, HOW DIFFICULT HAVE THESE PROBLEMS MADE IT FOR YOU TO DO YOUR WORK, TAKE CARE OF THINGS AT HOME, OR GET ALONG WITH OTHER PEOPLE: SOMEWHAT DIFFICULT
1. FEELING NERVOUS, ANXIOUS, OR ON EDGE: SEVERAL DAYS
5. BEING SO RESTLESS THAT IT IS HARD TO SIT STILL: NOT AT ALL
6. BECOMING EASILY ANNOYED OR IRRITABLE: NOT AT ALL
7. FEELING AFRAID AS IF SOMETHING AWFUL MIGHT HAPPEN: NOT AT ALL
4. TROUBLE RELAXING: SEVERAL DAYS
5. BEING SO RESTLESS THAT IT IS HARD TO SIT STILL: NOT AT ALL
7. FEELING AFRAID AS IF SOMETHING AWFUL MIGHT HAPPEN: NOT AT ALL
GAD7 TOTAL SCORE: 4
3. WORRYING TOO MUCH ABOUT DIFFERENT THINGS: SEVERAL DAYS
2. NOT BEING ABLE TO STOP OR CONTROL WORRYING: SEVERAL DAYS
IF YOU CHECKED OFF ANY PROBLEMS ON THIS QUESTIONNAIRE, HOW DIFFICULT HAVE THESE PROBLEMS MADE IT FOR YOU TO DO YOUR WORK, TAKE CARE OF THINGS AT HOME, OR GET ALONG WITH OTHER PEOPLE: SOMEWHAT DIFFICULT
6. BECOMING EASILY ANNOYED OR IRRITABLE: NOT AT ALL
4. TROUBLE RELAXING: SEVERAL DAYS
2. NOT BEING ABLE TO STOP OR CONTROL WORRYING: SEVERAL DAYS
3. WORRYING TOO MUCH ABOUT DIFFERENT THINGS: SEVERAL DAYS

## 2023-07-13 ASSESSMENT — PAIN SCALES - GENERAL: PAINLEVEL: MODERATE PAIN (5)

## 2023-07-13 NOTE — PROGRESS NOTES
Assessment & Plan     Type 2 diabetes mellitus without complication, unspecified whether long term insulin use (H)  I recommended he increase the Lantus to 20 units daily and continue metformin XR 1000 mg twice daily.  We also discussed the importance of eating healthy foods which he admits has been a problem for him lately.  He is going to be due for his next A1c in about a month.  I recommended he schedule this follow-up visit.  - insulin glargine (LANTUS PEN) 100 UNIT/ML pen; Inject 20 Units Subcutaneous At Bedtime    Benign essential hypertension  I expressed my concern that his blood pressure is running high today.  We discussed options and decided to increase the metoprolol dose to 100 mg daily.  He will continue the hydrochlorothiazide and losartan as prescribed.  We will be rechecking his blood pressure at his follow-up visit next month.  In the meantime, recommended he eat a healthy low-sodium diet and start getting regular physical activity.  - metoprolol succinate ER (TOPROL XL) 100 MG 24 hr tablet; Take 1 tablet (100 mg) by mouth daily    Hyperlipidemia LDL goal <100  Stable on atorvastatin 30 mg daily    Peripheral polyneuropathy  We will continue to work on getting better control of the glucose/diabetes to help prevent this issue from getting worse.  He is currently on Lyrica.    Urinary retention with incomplete bladder emptying  Continue Flomax    Obstructive sleep apnea syndrome  Continue CPAP    NSTEMI (non-ST elevated myocardial infarction) (H)  He was given a refill of nitroglycerin which she reports losing.  He states that he has never needed to use this medication before.  - nitroGLYcerin (NITROSTAT) 0.4 MG sublingual tablet; Place 1 tablet (0.4 mg) under the tongue every 5 minutes as needed for chest pain For chest pain place 1 tablet under the tongue every 5 minutes for 3 doses. If symptoms persist 5 minutes after 1st dose call 911.    Spinal stenosis at L4-L5 level  He is requesting a  refill of cyclobenzaprine that has been helpful with low back pain symptoms.  - cyclobenzaprine (FLEXERIL) 10 MG tablet; Take 1 tablet (10 mg) by mouth 3 times daily as needed for muscle spasms                 Sherwin Mcdermott DO  [facility]    Lavinia Almendarez is a 67 year old, presenting for the following health issues:  Urinary Problem and Diabetes (Legs)        7/13/2023     9:22 AM   Additional Questions   Roomed by Xavier MARQUIS     History of Present Illness       Reason for visit:  Diabetes; legs  Symptom onset:  3-4 weeks ago  Symptom intensity:  Moderate  Symptom progression:  Staying the same  Had these symptoms before:  No  What makes it worse:  Sitting down  What makes it better:  No    He eats 0-1 servings of fruits and vegetables daily.He consumes 0 sweetened beverage(s) daily.He exercises with enough effort to increase his heart rate 9 or less minutes per day.  He exercises with enough effort to increase his heart rate 3 or less days per week. He is missing 2 dose(s) of medications per week.  He is not taking prescribed medications regularly due to cost of medication and remembering to take.  Today's NANCY-7 Score: 4       Diabetes Follow-up    How often are you checking your blood sugar? One time daily  What time of day are you checking your blood sugars (select all that apply)?  Before meals  Have you had any blood sugars above 200?  Yes 331 max, 140-240 in morning before eating  Have you had any blood sugars below 70?  No    What symptoms do you notice when your blood sugar is low?  Dizzy    What concerns do you have today about your diabetes? Blood sugar is often over 200 and Other: Legs     Do you have any of these symptoms? (Select all that apply)  Numbness in feet, Redness, sores, or blisters on feet, Excessive thirst and Weight gain    He has a complex medical history significant for poorly controlled type 2 diabetes, peripheral neuropathy, peripheral vascular disease,  "coronary artery disease, history of NSTEMI with stent placement, amyloidosis, history of peripheral stem cell transplant, thrombocytopenia, obesity, fatty cirrhosis of the liver, bipolar disorder, migraine headaches, chronic pain syndrome, hypertension, hyperlipidemia and obstructive sleep apnea.  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     On 5/12/2023 his hemoglobin A1c was 8.8%.  He reports that his blood sugars have been frequently running high lately.  He attributes this to eating a poor diet.  In the past he has had some difficulty remembering to take the Lantus.  He is currently on 10 units daily in addition to metformin XR 1000 mg twice daily.    His blood pressure at home has been in the 120s-150s/mid 80s-90s.  He is currently on metoprolol XL 50 mg daily, losartan 100 mg daily and hydrochlorothiazide 37.5 mg daily.  He is not complaining of headaches or blurry vision.  He reports that his legs seem to be doing well.  He still has some chronic edema issues, but he denies any open sores/wounds at this time.    BP Readings from Last 2 Encounters:   07/13/23 (!) 158/86   06/20/23 (!) 173/95     Hemoglobin A1C (%)   Date Value   05/12/2023 8.8 (H)   02/23/2023 9.3 (H)   05/07/2021 5.8 (H)   11/19/2020 5.6     LDL Cholesterol Calculated (mg/dL)   Date Value   02/23/2023 79   07/23/2021 53   02/16/2021 29   02/24/2020 107 (H)                 Review of Systems   Constitutional, HEENT, cardiovascular, pulmonary, GI, , musculoskeletal, neuro, skin, endocrine and psych systems are negative, except as otherwise noted.      Objective    BP (!) 158/86   Pulse 88   Temp 96.9  F (36.1  C) (Temporal)   Resp 16   Ht 1.695 m (5' 6.75\")   Wt 114.4 kg (252 lb 4.8 oz)   SpO2 92%   BMI 39.81 kg/m    Body mass index is 39.81 kg/m .  Physical Exam     GENERAL: Appears moderately fatigued and no distress  EYES: Eyes grossly normal to " inspection, PERRL and conjunctivae and sclerae normal  HENT:  nose and mouth without ulcers or lesions  NECK: no adenopathy, no asymmetry, masses, or scars and thyroid normal to palpation  RESP: lungs clear to auscultation - no rales, rhonchi or wheezes  CV: regular rate and rhythm, normal S1 S2, no S3 or S4, no murmur, click or rub   ABDOMEN: soft, nontender, no hepatosplenomegaly, no masses and bowel sounds normal  MS: 1+ lower EXTR edema.  There are some chronic disease changes and mild erythema bilaterally.  No open sores/wounds in his legs.  SKIN: no suspicious lesions or rashes  NEURO: Normal strength and tone, mentation intact and speech normal  PSYCH: mentation appears normal.  He appears moderately fatigued.

## 2023-07-17 ENCOUNTER — TELEPHONE (OUTPATIENT)
Dept: FAMILY MEDICINE | Facility: CLINIC | Age: 68
End: 2023-07-17
Payer: COMMERCIAL

## 2023-07-17 NOTE — TELEPHONE ENCOUNTER
Forms/Letter Request    Type of form/letter: Perscription assistance   For lantus solostar, vraylar, cymbalta   Have you been seen for this request: N/A    Do we have the form/letter: Yes:     Who is the form from? [facility] (if other please explain)    Where did/will the form come from? form was mailed in INTEROFFICE mail    When is form/letter needed by: asap    How would you like the form/letter returned: INCLUDED IS AN ADDRESSED INTER[employer] ENVELOPE TO RETURN THE FORM    Patient Notified form requests are processed in 3-5 business days:No    Could we send this information to you in [place] or would you prefer to receive a phone call?:   No preference     Okay to leave a detailed message?: No at Other phone number:  [phone] [facility]

## 2023-07-18 ENCOUNTER — TRANSFERRED RECORDS (OUTPATIENT)
Dept: HEALTH INFORMATION MANAGEMENT | Facility: CLINIC | Age: 68
End: 2023-07-18
Payer: COMMERCIAL

## 2023-07-18 NOTE — TELEPHONE ENCOUNTER
Forms completed and signed, placed in Inter[facility] addressed Envelope  [facility] Unit Coordinator

## 2023-07-25 ENCOUNTER — OFFICE VISIT (OUTPATIENT)
Dept: PEDIATRICS | Facility: CLINIC | Age: 68
End: 2023-07-25
Payer: COMMERCIAL

## 2023-07-25 ENCOUNTER — OFFICE VISIT (OUTPATIENT)
Dept: INTERNAL MEDICINE | Facility: CLINIC | Age: 68
End: 2023-07-25
Payer: COMMERCIAL

## 2023-07-25 ENCOUNTER — HOSPITAL ENCOUNTER (OUTPATIENT)
Dept: ULTRASOUND IMAGING | Facility: CLINIC | Age: 68
Discharge: HOME | End: 2023-07-25
Attending: FAMILY MEDICINE | Admitting: FAMILY MEDICINE
Payer: COMMERCIAL

## 2023-07-25 VITALS
RESPIRATION RATE: 18 BRPM | TEMPERATURE: 97.4 F | SYSTOLIC BLOOD PRESSURE: 132 MMHG | WEIGHT: 252.7 LBS | OXYGEN SATURATION: 94 % | HEART RATE: 87 BPM | DIASTOLIC BLOOD PRESSURE: 90 MMHG | BODY MASS INDEX: 39.66 KG/M2 | HEIGHT: 67 IN

## 2023-07-25 VITALS
HEIGHT: 67 IN | DIASTOLIC BLOOD PRESSURE: 89 MMHG | OXYGEN SATURATION: 94 % | BODY MASS INDEX: 39.66 KG/M2 | SYSTOLIC BLOOD PRESSURE: 127 MMHG | WEIGHT: 252.7 LBS | HEART RATE: 87 BPM | TEMPERATURE: 97.4 F

## 2023-07-25 DIAGNOSIS — R22.43 LOCALIZED SWELLING OF BOTH LOWER LEGS: ICD-10-CM

## 2023-07-25 DIAGNOSIS — M79.662 BILATERAL CALF PAIN: Primary | ICD-10-CM

## 2023-07-25 DIAGNOSIS — M79.662 PAIN OF LEFT LOWER LEG: ICD-10-CM

## 2023-07-25 DIAGNOSIS — I87.2 VENOUS STASIS DERMATITIS OF BOTH LOWER EXTREMITIES: ICD-10-CM

## 2023-07-25 DIAGNOSIS — G98.8 DISORDER OF NERVOUS SYSTEM DUE TO TYPE 2 DIABETES MELLITUS (H): ICD-10-CM

## 2023-07-25 DIAGNOSIS — I87.2 VENOUS STASIS DERMATITIS OF BOTH LOWER EXTREMITIES: Primary | ICD-10-CM

## 2023-07-25 DIAGNOSIS — E11.69 DISORDER OF NERVOUS SYSTEM DUE TO TYPE 2 DIABETES MELLITUS (H): ICD-10-CM

## 2023-07-25 DIAGNOSIS — L03.116 CELLULITIS OF LEFT LOWER EXTREMITY: ICD-10-CM

## 2023-07-25 DIAGNOSIS — E11.42 TYPE 2 DIABETES MELLITUS WITH DIABETIC POLYNEUROPATHY, WITHOUT LONG-TERM CURRENT USE OF INSULIN (H): ICD-10-CM

## 2023-07-25 DIAGNOSIS — M79.661 BILATERAL CALF PAIN: Primary | ICD-10-CM

## 2023-07-25 LAB
ANION GAP SERPL CALCULATED.3IONS-SCNC: 13 MMOL/L (ref 7–15)
BUN SERPL-MCNC: 22.4 MG/DL (ref 8–23)
CALCIUM SERPL-MCNC: 9.7 MG/DL (ref 8.8–10.2)
CHLORIDE SERPL-SCNC: 99 MMOL/L (ref 98–107)
CREAT SERPL-MCNC: 0.89 MG/DL (ref 0.67–1.17)
DEPRECATED HCO3 PLAS-SCNC: 23 MMOL/L (ref 22–29)
GFR SERPL CREATININE-BSD FRML MDRD: >90 ML/MIN/1.73M2
GLUCOSE SERPL-MCNC: 236 MG/DL (ref 70–99)
MAGNESIUM SERPL-MCNC: 1.7 MG/DL (ref 1.7–2.3)
POTASSIUM SERPL-SCNC: 4.6 MMOL/L (ref 3.4–5.3)
SODIUM SERPL-SCNC: 135 MMOL/L (ref 136–145)

## 2023-07-25 PROCEDURE — 96127 BRIEF EMOTIONAL/BEHAV ASSMT: CPT | Performed by: INTERNAL MEDICINE

## 2023-07-25 PROCEDURE — 99214 OFFICE O/P EST MOD 30 MIN: CPT | Performed by: FAMILY MEDICINE

## 2023-07-25 PROCEDURE — 93970 EXTREMITY STUDY: CPT

## 2023-07-25 PROCEDURE — 99207 REFERRAL TO ACUTE AND DIAGNOSTIC SERVICES: CPT | Performed by: INTERNAL MEDICINE

## 2023-07-25 PROCEDURE — 80048 BASIC METABOLIC PNL TOTAL CA: CPT | Performed by: FAMILY MEDICINE

## 2023-07-25 PROCEDURE — 83735 ASSAY OF MAGNESIUM: CPT | Performed by: FAMILY MEDICINE

## 2023-07-25 PROCEDURE — 36415 COLL VENOUS BLD VENIPUNCTURE: CPT | Performed by: FAMILY MEDICINE

## 2023-07-25 ASSESSMENT — PATIENT HEALTH QUESTIONNAIRE - PHQ9
10. IF YOU CHECKED OFF ANY PROBLEMS, HOW DIFFICULT HAVE THESE PROBLEMS MADE IT FOR YOU TO DO YOUR WORK, TAKE CARE OF THINGS AT HOME, OR GET ALONG WITH OTHER PEOPLE: SOMEWHAT DIFFICULT
SUM OF ALL RESPONSES TO PHQ QUESTIONS 1-9: 10
SUM OF ALL RESPONSES TO PHQ QUESTIONS 1-9: 10

## 2023-07-25 NOTE — PATIENT INSTRUCTIONS
Need lower extremity ultrasound to make sure no blood clots in the veins of the lower legs.      If there are no blood clots, then this is probably achilles tendon strain and or tissue swelling from the recent infection.      Referral today to acute and diagnostic clinic at the [facility].     [facility] address is:     [place] [address], [address]   [place] [address]  Patients enter the [address] [place] [address] from the [facility] side of the [facility]. Parking is free, on the flat lot in front of the [address] [place] [address].

## 2023-07-25 NOTE — PROGRESS NOTES
Assessment & Plan     Localized swelling of both lower legs  - US Lower Extremity Venous Duplex Bilateral    Cellulitis of left lower extremity  - Referral to Acute and Diagnostic Services (Day of diagnostic / First order acute)    Bilateral calf pain  - Basic metabolic panel  (Ca, Cl, CO2, Creat, Gluc, K, Na, BUN)  - Magnesium  - Basic metabolic panel  (Ca, Cl, CO2, Creat, Gluc, K, Na, BUN)  - Magnesium    No evidence of deep vein thrombosis on ultrasound imaging today overall the suspicion for acute heart failure is low.  Given his complex history of Achilles tendon ruptures of the child with his reported symptoms of discomfort persisting I have placed a referral for physical therapy as he may benefit from rehab exercises and/or stretching.        Mitch Oates MD   [facility]    Lavinia Almendarez is a 68 year old male who presents to clinic today for the following health issues:  Chief Complaint   Patient presents with     Swelling     HPI    Patient presents today with concerns about lower calf discomfort that has persisted he has not had any muscle cramps is concerned for possible DVT after assessment with primary care office.  No recent history or prior history of deep vein thrombosis.  No loss of sensation.  Denies any leg weakness.  Age 13 he had a terrible incident where both Achilles tendons ruptured and he was placed in conservative therapy with rehabilitation but has not had persistent discomfort such as what he is experiencing at this current time        Patient Active Problem List    Diagnosis Date Noted     Abdominal pain, generalized 05/30/2023     Priority: Medium     Nausea and vomiting, unspecified vomiting type 05/30/2023     Priority: Medium     Lymphedema 03/12/2023     Priority: Medium     Cellulitis of right lower extremity 03/12/2023     Priority: Medium     Bipolar 1 disorder, manic, mild (H) 11/29/2022     Priority: Medium     Benign prostatic hyperplasia 11/28/2022      Priority: Medium     Bilateral cataracts 11/28/2022     Priority: Medium     Cardiovascular stress test abnormal 11/28/2022     Priority: Medium     Contusion of rib 11/28/2022     Priority: Medium     Coronary arteriosclerosis 11/28/2022     Priority: Medium     Decreased testosterone level 11/28/2022     Priority: Medium     Chronic diarrhea 11/28/2022     Priority: Medium     Hernia of anterior abdominal wall 11/28/2022     Priority: Medium     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) 11/28/2022     Priority: Medium     History of anaphylaxis due to severe acute respiratory syndrome coronavirus 2 (SARS-CoV-2) vaccine 11/28/2022     Priority: Medium     Hypertensive renal disease 11/28/2022     Priority: Medium     Hypocalcemia 11/28/2022     Priority: Medium     Chronic pain 11/28/2022     Priority: Medium     Peripheral vascular disease (H) 02/08/2021     Priority: Medium     Chronic fatigue, unspecified 11/07/2020     Priority: Medium     Guillain-Roscoe syndrome (H) 11/07/2020     Priority: Medium     Bilateral leg weakness 10/20/2020     Priority: Medium     Continuous opioid dependence (H) 08/06/2020     Priority: Medium     DDD (degenerative disc disease), lumbar 08/06/2020     Priority: Medium     Presence of implanted infusion pump 08/06/2020     Priority: Medium     Formatting of this note might be different from the original.  Bupivacaine, morphine and fentanyl; follows with Dr. Shaw at [facility]  Formatting of this note might be different from the original.  Formatting of this note might be different from the original.  Bupivacaine, morphine and fentanyl; follows with Dr. Shaw at [facility]  Formatting of this note might be different from the original.  Formatting of this note might be different from the original.  Bupivacaine, morphine and fentanyl; follows with Dr. Shaw at [facility]  Formatting of this note might be different from the original.  Formatting of this note might be  different from the original.  Bupivacaine, morphine and fentanyl; follows with Dr. Shaw at [facility]       Spinal stenosis at L4-L5 level 08/05/2020     Priority: Medium     Thrombocytopenia, unspecified (H) 08/03/2020     Priority: Medium     Bilateral leg edema 06/29/2020     Priority: Medium     Onychomycosis 06/29/2020     Priority: Medium     Nonalcoholic steatohepatitis 05/05/2020     Priority: Medium     Other cirrhosis of liver (H) 04/15/2020     Priority: Medium     Low blood pressure reading 05/16/2019     Priority: Medium     Polyneuropathy, unspecified 02/04/2019     Priority: Medium     Acquired lymphedema 02/04/2019     Priority: Medium     Venous stasis dermatitis of both lower extremities 12/31/2018     Priority: Medium     Retention of urine 12/24/2018     Priority: Medium     Morbid obesity (H) 08/28/2018     Priority: Medium     Benign neoplasm of cecum 08/03/2018     Priority: Medium     Benign neoplasm of transverse colon 08/03/2018     Priority: Medium     History of colonic polyps 08/01/2018     Priority: Medium     Formatting of this note might be different from the original.  Overview:   1/15-rxed with antibiotics  Formatting of this note might be different from the original.  Formatting of this note might be different from the original.  Overview:   1/15-rxed with antibiotics  Formatting of this note might be different from the original.  Formatting of this note might be different from the original.  Overview:   1/15-rxed with antibiotics       Polyp of colon 08/01/2018     Priority: Medium     Leg edema 05/20/2018     Priority: Medium     Noncardiac  Continue with  furosemide (LASIX) 20 MG tablet,   potassium       chloride SA (K-DUR/KLOR-CON M) 10 MEQ CR  Tab once daily   Basic metabolic panel       NSTEMI (non-ST elevated myocardial infarction) (H) 04/07/2018     Priority: Medium     Right upper quadrant pain 09/05/2017     Priority: Medium     Restless legs syndrome (RLS) 06/29/2017      Priority: Medium     Acquired pancytopenia (H) 12/30/2016     Priority: Medium     History of peripheral stem cell transplant (H) 12/16/2016     Priority: Medium     Cannabis dependence in remission (H) 10/27/2016     Priority: Medium     Depressed bipolar I disorder in full remission (H) 10/27/2016     Priority: Medium     Insomnia due to medical condition 08/23/2016     Priority: Medium     Light chain (AL) amyloidosis (H) 06/01/2016     Priority: Medium      #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 7/3/2018      Dr [id number]       Localized enlarged lymph nodes 04/28/2016     Priority: Medium     Formatting of this note might be different from the original.  Portacaval nodes up to 3.5 cm in diameter on abd CT 4/28/2016  Formatting of this note might be different from the original.  Formatting of this note might be different from the original.  Portacaval nodes up to 3.5 cm in diameter on abd CT 4/28/2016       Generalized abdominal pain 03/15/2016     Priority: Medium     Formatting of this note might be different from the original.  Overview:   Patient is followed by RAYMOND FUNG for ongoing prescription of pain medication.  All refills should be approved by this provider, or covering partner.    Medication(s): Norco  .   Maximum quantity per month:   Clinic visit frequency required:      Controlled substance agreement on file: No    Pain Clinic evaluation in the past:      DIRE Total Score(s):  No flowsheet data found.    Last [facility] website verification:  Done on 7/26/16   [email or web address]       Abnormal electrocardiography 03/15/2016     Priority: Medium     MENTAL HEALTH 08/17/2015     Priority: Medium     Immunoglobulin deficiency (H) 08/03/2015     Priority: Medium     Other muscle spasm 07/13/2015     Priority: Medium     Cerebral infarction, unspecified (H) 07/13/2015     Priority: Medium     Abnormal computed  tomography scan 06/18/2015     Priority: Medium     Noninfectious gastroenteritis 06/09/2015     Priority: Medium     History of diverticulitis 01/27/2015     Priority: Medium     1/15-rxed with antibiotics  Formatting of this note might be different from the original.  Formatting of this note might be different from the original.  1/15-rxed with antibiotics  Formatting of this note might be different from the original.  Formatting of this note might be different from the original.  1/15-rxed with antibiotics       Obstructive sleep apnea syndrome 12/19/2013     Priority: Medium     sleep study  Formatting of this note might be different from the original.  Overview:   sleep study  Formatting of this note might be different from the original.  Formatting of this note might be different from the original.  Overview:   sleep study  Formatting of this note might be different from the original.  sleep study  Formatting of this note might be different from the original.  Formatting of this note might be different from the original.  Overview:   sleep study  Formatting of this note might be different from the original.  sleep study  Formatting of this note might be different from the original.  Formatting of this note might be different from the original.  Overview:   sleep study  Formatting of this note might be different from the original.  sleep study       Other specified anxiety disorders 12/18/2013     Priority: Medium     Advanced directives, counseling/discussion 05/29/2012     Priority: Medium     Discussed advance care planning with patient; information given to patient to review. 5/29/2012     Honoring choices letter mailed. 7-  RT Hollis (R)         Migraine headache 05/29/2012     Priority: Medium     Hypertension goal BP (blood pressure) < 140/90 10/11/2011     Priority: Medium     Type 2 diabetes mellitus (H) 10/11/2011     Priority: Medium     Essential hypertension 10/11/2011     Priority:  Medium     Hyperlipidemia 10/31/2010     Priority: Medium     Low back pain 06/17/2008     Priority: Medium       Current Outpatient Medications   Medication     acetaminophen (TYLENOL) 500 MG tablet     ammonium lactate (AMLACTIN) 12 % external cream     aspirin (ASPIRIN LOW DOSE) 81 MG tablet     atorvastatin (LIPITOR) 20 MG tablet     Bioflavonoid Products (CANDIDO-C) TABS     blood glucose (NO BRAND SPECIFIED) lancets standard     blood glucose (NO BRAND SPECIFIED) test strip     buPROPion (WELLBUTRIN XL) 150 MG 24 hr tablet     calcium carbonate (OS-LUIS) 1500 (600 Ca) MG tablet     cariprazine (VRAYLAR) 4.5 MG capsule     cyclobenzaprine (FLEXERIL) 10 MG tablet     DULoxetine (CYMBALTA) 30 MG capsule     Folic Acid-Vit B6-Vit B12 0.5-5-0.2 MG TABS     glucose 40 % (400 mg/mL) gel     hydrochlorothiazide (HYDRODIURIL) 25 MG tablet     insulin glargine (LANTUS PEN) 100 UNIT/ML pen     loperamide (IMODIUM) 2 MG capsule     losartan (COZAAR) 100 MG tablet     medication given by implanted intrathecal pump     metFORMIN (GLUCOPHAGE XR) 500 MG 24 hr tablet     metoprolol succinate ER (TOPROL XL) 100 MG 24 hr tablet     metroNIDAZOLE (FLAGYL) 500 MG tablet     modafinil (PROVIGIL) 200 MG tablet     multivitamin w/minerals (THERA-VIT-M) tablet     nitroGLYcerin (NITROSTAT) 0.4 MG sublingual tablet     OVER-THE-COUNTER     oxyCODONE-acetaminophen (PERCOCET) 5-325 MG tablet     POTASSIUM PO     pregabalin (LYRICA) 100 MG capsule     pregabalin (LYRICA) 100 MG capsule     senna-docusate (SENOKOT-S/PERICOLACE) 8.6-50 MG tablet     SUMAtriptan (IMITREX) 50 MG tablet     tamsulosin (FLOMAX) 0.4 MG capsule     testosterone (ANDROGEL/TESTIM) 50 MG/5GM (1%) topical gel     vitamin B-Complex     vitamin D3 (CHOLECALCIFEROL) 50 mcg (2000 units) tablet     No current facility-administered medications for this visit.           Objective    BP (!) 132/90 (BP Location: Left arm, Patient Position: Sitting, Cuff Size: Adult Regular)    "Pulse 87   Temp 97.4  F (36.3  C)   Resp 18   Ht 1.695 m (5' 6.75\")   Wt 114.6 kg (252 lb 11.2 oz)   SpO2 94%   BMI 39.88 kg/m    Physical Exam     Legs: wearing compression stockings, no edema  CV: HDS  Pulm: non-labored  GEN: NAD    Results for orders placed or performed during the hospital encounter of 07/25/23   US Lower Extremity Venous Duplex Bilateral     Status: None    Narrative    VENOUS ULTRASOUND BOTH LEGS  7/25/2023 10:57 AM     HISTORY: leg swelling 1 month, pain increasing. L> R; Venous stasis  dermatitis of both lower extremities; Localized swelling of both lower  legs    COMPARISON: None.    FINDINGS:  Examination of the deep veins with graded compression and  color flow Doppler with spectral wave form analysis was performed.      Impression    IMPRESSION: No evidence of deep venous thrombosis.    KAYLEEN DOOLEY MD         SYSTEM ID:  [id number]   Results for orders placed or performed in visit on 07/25/23   Basic metabolic panel  (Ca, Cl, CO2, Creat, Gluc, K, Na, BUN)     Status: Abnormal   Result Value Ref Range    Sodium 135 (L) 136 - 145 mmol/L    Potassium 4.6 3.4 - 5.3 mmol/L    Chloride 99 98 - 107 mmol/L    Carbon Dioxide (CO2) 23 22 - 29 mmol/L    Anion Gap 13 7 - 15 mmol/L    Urea Nitrogen 22.4 8.0 - 23.0 mg/dL    Creatinine 0.89 0.67 - 1.17 mg/dL    Calcium 9.7 8.8 - 10.2 mg/dL    Glucose 236 (H) 70 - 99 mg/dL    GFR Estimate >90 >60 mL/min/1.73m2   Magnesium     Status: Normal   Result Value Ref Range    Magnesium 1.7 1.7 - 2.3 mg/dL                     The use of Dragon/Flixwagonation services may have been used to construct the content in this note; any grammatical or spelling errors are non-intentional. Please contact the author of this note directly if you are in need of any clarification.   "

## 2023-07-25 NOTE — PROGRESS NOTES
Assessment & Plan     (I87.2) Venous stasis dermatitis of both lower extremities  (primary encounter diagnosis)  Comment: Pain in both calves, hopefully just related to venous stasis.  Given his medical issues, recent hospitalization, use of testosterone is at risk for thrombotic disease.  Will refer to [facility] clinic for ultrasound to rule out DVT, which the most concerning item.  Plan: Referral to Acute and Diagnostic Services (Day         of diagnostic / First order acute)            (M79.662) Pain of left lower leg  Comment: Send to [facility] as above.  Plan:     (E11.42) Type 2 diabetes mellitus with diabetic polyneuropathy, without long-term current use of insulin (H)  Comment:   Plan: Referral to Acute and Diagnostic Services (Day         of diagnostic / First order acute)            (L03.116) Cellulitis of left lower extremity  Comment:   Plan: Referral to Acute and Diagnostic Services (Day         of diagnostic / First order acute)            (E11.69,  G98.8) 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)  Comment:   Plan: Referral to Acute and Diagnostic Services (Day         of diagnostic / First order acute)                        Depression Screening Follow Up        7/25/2023     8:19 AM   PHQ   PHQ-9 Total Score 10   Q9: Thoughts of better off dead/self-harm past 2 weeks Not at all         Follow Up Actions Taken           Brian Hdz MD  [facility]    Lavinia Almendarez is a 68 year old, presenting for the following health issues:  Musculoskeletal Problem (Leg cramps/)    HPI     Concern - leg cramps   Onset: one month   Description: cramp in left calf   Intensity: moderate  Progression of Symptoms:  worsening  Accompanying Signs & Symptoms: n/a  Previous history of similar problem: yes, but not this bad  Precipitating factors:        Worsened by: going sitting to standing  Alleviating factors:        Improved by: potassium maybe   Therapies tried  "and outcome: potassium, with improvement    Reports ongoing chronic crampy pain and both calfs over the last few weeks, specifically much worse in the left calf than the right.  Worse with walking better with sitting.    Recent hospitalization for cellulitis in lower extremities.    History of venous chronic stasis dermatitis with venous stasis ulcers in the past.    Swelling similar to possible discharge.    2.  Type 2 diabetes with numerous complications.    3.  Severe chronic pain, has an implantable pump        **I reviewed the information recorded in the patient's EPIC chart (including but not limited to medical history, surgical history, family history, problem list, medication list, and allergy list) and updated the information as indicated based on the patients reported information.         Review of Systems   Constitutional, HEENT, cardiovascular, pulmonary, gi and gu systems are negative, except as otherwise noted.      Objective    /89   Pulse 87   Temp 97.4  F (36.3  C) (Temporal)   Ht 1.695 m (5' 6.75\")   Wt 114.6 kg (252 lb 11.2 oz)   SpO2 94%   BMI 39.88 kg/m    Body mass index is 39.88 kg/m .  Physical Exam   GENERAL alert and no distress  EYES:  Normal sclera,conjunctiva, EOMI  HENT: facies symmetric  PSYCH: Alert and oriented times 3; speech- coherent  SKIN:  No obvious significant skin lesions on visible portions of face   NEURO:  Normal facial movements.  Appears to move normally   EXT: Chronic venous stasis changes in both legs.  No obvious open wounds.  Pain with calf squeeze on the left;  Homans test equivocal on both sides.  Palpable pedal pulses in both sides.                      "

## 2023-07-25 NOTE — PROGRESS NOTES
Lavinia Almendarez is a 68 year old, presenting for the following health issues:  No chief complaint on file.      7/13/2023     9:22 AM   Additional Questions   Roomed by Xavier HARDEN     Evaluation for possible DVT  Onset/Duration: 1 month ago  Description:       Location: both legs, left worse than right       Redness: YES       Pain: moderate       Warmth: no        Joint swelling no   Progression of symptoms worse  Accompanying signs and symptoms:       Fevers: no        Numbness/tingling/weakness: no        Chest pain/pleurisy: no        Shortness of breath: no   History        Trauma: no         Recent travel/when: no         Previous history of DVT: no         Family history of DVT: YES- Dad had hx of venous problem in leg and almost had amputation, had procedure done to help resolve issue        Recent surgery: no   Aggravating factors include: none  Therapies tried and outcome: rest/inactivity, support wrap, and elevation  Prior surgery on arteries of veins in this area: No

## 2023-07-27 ENCOUNTER — TELEPHONE (OUTPATIENT)
Dept: FAMILY MEDICINE | Facility: CLINIC | Age: 68
End: 2023-07-27
Payer: COMMERCIAL

## 2023-07-27 DIAGNOSIS — R79.89 DECREASED TESTOSTERONE LEVEL: Primary | ICD-10-CM

## 2023-07-27 RX ORDER — TESTOSTERONE GEL, 1% 10 MG/G
50 GEL TRANSDERMAL DAILY
Qty: 30 PACKET | Refills: 1 | Status: SHIPPED | OUTPATIENT
Start: 2023-07-27

## 2023-07-27 NOTE — TELEPHONE ENCOUNTER
DE,      Patient called.  States he lived in [place], [place] for 2 years.  [place] provider prescribed Testosterone.    Patient asking if you will send in Rx.  Order T'd up.    FYI:  Patient has OV scheduled with you on 8/1 .      Thank you,  Wandy Pena RN

## 2023-08-01 ENCOUNTER — ANCILLARY PROCEDURE (OUTPATIENT)
Dept: GENERAL RADIOLOGY | Facility: CLINIC | Age: 68
End: 2023-08-01
Attending: FAMILY MEDICINE
Payer: COMMERCIAL

## 2023-08-01 ENCOUNTER — OFFICE VISIT (OUTPATIENT)
Dept: FAMILY MEDICINE | Facility: CLINIC | Age: 68
End: 2023-08-01
Payer: COMMERCIAL

## 2023-08-01 VITALS
SYSTOLIC BLOOD PRESSURE: 114 MMHG | TEMPERATURE: 97.5 F | HEIGHT: 67 IN | BODY MASS INDEX: 39.55 KG/M2 | DIASTOLIC BLOOD PRESSURE: 75 MMHG | RESPIRATION RATE: 14 BRPM | OXYGEN SATURATION: 90 % | WEIGHT: 252 LBS | HEART RATE: 83 BPM

## 2023-08-01 DIAGNOSIS — R09.02 HYPOXIA: ICD-10-CM

## 2023-08-01 DIAGNOSIS — M19.112 POST-TRAUMATIC OSTEOARTHRITIS OF LEFT SHOULDER: ICD-10-CM

## 2023-08-01 DIAGNOSIS — R53.82 CHRONIC FATIGUE: Primary | ICD-10-CM

## 2023-08-01 DIAGNOSIS — G89.4 CHRONIC PAIN SYNDROME: ICD-10-CM

## 2023-08-01 DIAGNOSIS — E66.01 MORBID OBESITY (H): ICD-10-CM

## 2023-08-01 DIAGNOSIS — E11.9 TYPE 2 DIABETES MELLITUS WITHOUT COMPLICATION, UNSPECIFIED WHETHER LONG TERM INSULIN USE (H): ICD-10-CM

## 2023-08-01 DIAGNOSIS — R06.00 DYSPNEA, UNSPECIFIED TYPE: ICD-10-CM

## 2023-08-01 DIAGNOSIS — M48.061 SPINAL STENOSIS AT L4-L5 LEVEL: ICD-10-CM

## 2023-08-01 LAB
ANION GAP SERPL CALCULATED.3IONS-SCNC: 14 MMOL/L (ref 7–15)
BUN SERPL-MCNC: 28.2 MG/DL (ref 8–23)
CALCIUM SERPL-MCNC: 9.4 MG/DL (ref 8.8–10.2)
CHLORIDE SERPL-SCNC: 96 MMOL/L (ref 98–107)
CREAT SERPL-MCNC: 1.01 MG/DL (ref 0.67–1.17)
DEPRECATED HCO3 PLAS-SCNC: 23 MMOL/L (ref 22–29)
ERYTHROCYTE [DISTWIDTH] IN BLOOD BY AUTOMATED COUNT: 13.1 % (ref 10–15)
GFR SERPL CREATININE-BSD FRML MDRD: 82 ML/MIN/1.73M2
GLUCOSE SERPL-MCNC: 209 MG/DL (ref 70–99)
HCT VFR BLD AUTO: 39.6 % (ref 40–53)
HGB BLD-MCNC: 14.3 G/DL (ref 13.3–17.7)
MCH RBC QN AUTO: 33.1 PG (ref 26.5–33)
MCHC RBC AUTO-ENTMCNC: 36.1 G/DL (ref 31.5–36.5)
MCV RBC AUTO: 92 FL (ref 78–100)
NT-PROBNP SERPL-MCNC: 61 PG/ML (ref 0–900)
PLATELET # BLD AUTO: 137 10E3/UL (ref 150–450)
POTASSIUM SERPL-SCNC: 4.2 MMOL/L (ref 3.4–5.3)
RBC # BLD AUTO: 4.32 10E6/UL (ref 4.4–5.9)
SODIUM SERPL-SCNC: 133 MMOL/L (ref 136–145)
WBC # BLD AUTO: 6.3 10E3/UL (ref 4–11)

## 2023-08-01 PROCEDURE — 71046 X-RAY EXAM CHEST 2 VIEWS: CPT | Mod: TC | Performed by: RADIOLOGY

## 2023-08-01 PROCEDURE — 85027 COMPLETE CBC AUTOMATED: CPT | Performed by: FAMILY MEDICINE

## 2023-08-01 PROCEDURE — 99215 OFFICE O/P EST HI 40 MIN: CPT | Performed by: FAMILY MEDICINE

## 2023-08-01 PROCEDURE — 36415 COLL VENOUS BLD VENIPUNCTURE: CPT | Performed by: FAMILY MEDICINE

## 2023-08-01 PROCEDURE — 83880 ASSAY OF NATRIURETIC PEPTIDE: CPT | Performed by: FAMILY MEDICINE

## 2023-08-01 PROCEDURE — 80048 BASIC METABOLIC PNL TOTAL CA: CPT | Performed by: FAMILY MEDICINE

## 2023-08-01 ASSESSMENT — ANXIETY QUESTIONNAIRES
1. FEELING NERVOUS, ANXIOUS, OR ON EDGE: SEVERAL DAYS
4. TROUBLE RELAXING: NOT AT ALL
GAD7 TOTAL SCORE: 3
IF YOU CHECKED OFF ANY PROBLEMS ON THIS QUESTIONNAIRE, HOW DIFFICULT HAVE THESE PROBLEMS MADE IT FOR YOU TO DO YOUR WORK, TAKE CARE OF THINGS AT HOME, OR GET ALONG WITH OTHER PEOPLE: SOMEWHAT DIFFICULT
2. NOT BEING ABLE TO STOP OR CONTROL WORRYING: SEVERAL DAYS
8. IF YOU CHECKED OFF ANY PROBLEMS, HOW DIFFICULT HAVE THESE MADE IT FOR YOU TO DO YOUR WORK, TAKE CARE OF THINGS AT HOME, OR GET ALONG WITH OTHER PEOPLE?: SOMEWHAT DIFFICULT
IF YOU CHECKED OFF ANY PROBLEMS ON THIS QUESTIONNAIRE, HOW DIFFICULT HAVE THESE PROBLEMS MADE IT FOR YOU TO DO YOUR WORK, TAKE CARE OF THINGS AT HOME, OR GET ALONG WITH OTHER PEOPLE: SOMEWHAT DIFFICULT
5. BEING SO RESTLESS THAT IT IS HARD TO SIT STILL: NOT AT ALL
6. BECOMING EASILY ANNOYED OR IRRITABLE: NOT AT ALL
5. BEING SO RESTLESS THAT IT IS HARD TO SIT STILL: NOT AT ALL
4. TROUBLE RELAXING: NOT AT ALL
2. NOT BEING ABLE TO STOP OR CONTROL WORRYING: NOT AT ALL
6. BECOMING EASILY ANNOYED OR IRRITABLE: NOT AT ALL
7. FEELING AFRAID AS IF SOMETHING AWFUL MIGHT HAPPEN: NOT AT ALL
GAD7 TOTAL SCORE: 2
7. FEELING AFRAID AS IF SOMETHING AWFUL MIGHT HAPPEN: NOT AT ALL
3. WORRYING TOO MUCH ABOUT DIFFERENT THINGS: SEVERAL DAYS
GAD7 TOTAL SCORE: 2
3. WORRYING TOO MUCH ABOUT DIFFERENT THINGS: SEVERAL DAYS
1. FEELING NERVOUS, ANXIOUS, OR ON EDGE: SEVERAL DAYS
7. FEELING AFRAID AS IF SOMETHING AWFUL MIGHT HAPPEN: NOT AT ALL

## 2023-08-01 ASSESSMENT — PAIN SCALES - GENERAL: PAINLEVEL: NO PAIN (0)

## 2023-08-01 NOTE — PROGRESS NOTES
Assessment & Plan     Chronic fatigue  His chronic issues with fatigue that he has had over the past several years is likely multifactorial.  He has several chronic medical conditions that are likely contributing to this.  I expressed my concern that his oxygen saturation is pretty low today.  We had a monitor in the room and it was running in the upper 80s-mid 90s.  He did not appear acutely short of breath and his lungs sounded clear.  I recommended checking a chest x-ray in the office today.  I personally reviewed the images with him and provided an initial interpretation.  There is some evidence of cardiomegaly which appears similar to previous x-rays.  There is no acute infiltrates or effusions present.  The formal radiology report is pending.  I recommended that he keep monitoring his oxygen saturation at home with his oximeter.  I also recommended that he start taking the Lantus insulin regularly.  We decided to increase the dose to 30 units daily and I suggested he take this once daily instead of splitting up the dose since he is having difficulties with remembering to take this regularly.  He will be due to have his A1c rechecked in a couple of weeks.  He will continue to follow closely with his pain specialist.  He appears to be a little overmedicated today which may be contributing to the low oxygen saturation numbers we are seeing.  He reports that he is taking his pain medications as prescribed.    Hypoxia  - XR Chest 2 Views; Future  - BNP-N terminal pro; Future  - Basic metabolic panel  (Ca, Cl, CO2, Creat, Gluc, K, Na, BUN); Future  - CBC with platelets; Future  - BNP-N terminal pro  - Basic metabolic panel  (Ca, Cl, CO2, Creat, Gluc, K, Na, BUN)  - CBC with platelets    Type 2 diabetes mellitus without complication, unspecified whether long term insulin use (H)  - insulin glargine (LANTUS PEN) 100 UNIT/ML pen; Inject 30 Units Subcutaneous At Bedtime    Morbid obesity (H)    Spinal stenosis at  L4-L5 level    Chronic pain syndrome    Post-traumatic osteoarthritis of left shoulder    Dyspnea, unspecified type  - BNP-N terminal pro; Future  - BNP-N terminal pro      45 minutes spent by me on the date of the encounter doing chart review, review of test results, interpretation of tests, patient visit, and documentation            DO LADAN [facility]    Lavinia Almendarez is a 68 year old, presenting for the following health issues:  Diabetes        8/1/2023    10:30 AM   Additional Questions   Roomed by Xavier MARQUIS       History of Present Illness       Diabetes:   He presents for follow up of diabetes.  He is checking home blood glucose one time daily.   He checks blood glucose before meals.  Blood glucose is sometimes over 200 and never under 70. He is aware of hypoglycemia symptoms including shakiness, dizziness, weakness and lethargy.   He is concerned about blood sugar frequently over 200.   He is having numbness in feet and excessive thirst.            He eats 0-1 servings of fruits and vegetables daily.He consumes 0 sweetened beverage(s) daily.He exercises with enough effort to increase his heart rate 9 or less minutes per day.  He exercises with enough effort to increase his heart rate 3 or less days per week. He is missing 1 dose(s) of medications per week.     Modafinil refill question     Requesting labs to shaggy done - A1C specifically     O2 fluctuated between 80% and 94%    He scheduled a follow-up appointment today to discuss ongoing fatigue issues and some low oxygen saturation numbers he has been getting at home.  His numbers have been anywhere from 80-low 90s.  He admits that he occasionally feels a little winded and short of breath with exertion.  He denies any swelling.  He is not wheezing or coughing.  He is not gasping for air.  He denies any recent weight gain.    He has a complex medical history significant for poorly controlled type 2 diabetes,  "peripheral neuropathy, peripheral vascular disease, coronary artery disease, history of NSTEMI with stent placement, amyloidosis, history of peripheral stem cell transplant, thrombocytopenia, obesity, fatty cirrhosis of the liver, bipolar disorder, migraine headaches, chronic pain syndrome, hypertension, hyperlipidemia and obstructive sleep apnea.  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     On 5/12/2023 his hemoglobin A1c was 8.8%.  He reports that his blood sugars have been frequently running high lately.  He attributes this to eating a poor diet.  He admits that he has missing the Lantus dose few times a week.  He has been splitting this dose up and taking 10 units in the morning and 10 units in the evenings. He is currently on 10 units daily in addition to metformin XR 1000 mg twice daily.    He admits that he has not always using the testosterone gel.  His total testosterone level was low at 92 and it was last checked on 5/12/2023.  He is wondering if this may be contributing to the fatigue symptoms.     His blood pressure at home has been in the 120s-150s/mid 80s-90s.  He is currently on metoprolol XL 50 mg daily, losartan 100 mg daily and hydrochlorothiazide 37.5 mg daily.  He is not complaining of headaches or blurry vision.  He reports that his legs seem to be doing well.  He still has some chronic edema issues, but he denies any open sores/wounds at this time.            Review of Systems         Objective    /75   Pulse 83   Temp 97.5  F (36.4  C) (Temporal)   Resp 14   Ht 1.695 m (5' 6.75\")   Wt 114.3 kg (252 lb)   SpO2 90%   BMI 39.76 kg/m    Body mass index is 39.76 kg/m .  Physical Exam       GENERAL: Appears moderately fatigued and in no acute distress  EYES: Eyes grossly normal to inspection, PERRL and conjunctivae and sclerae normal  HENT:  nose and mouth without ulcers or lesions  NECK: no adenopathy, no " asymmetry, masses, or scars and thyroid normal to palpation  RESP: lungs clear to auscultation - no rales, rhonchi or wheezes.  Breathing is not labored  CV: regular rate and rhythm, normal S1 S2, no S3 or S4, no murmur, click or rub   ABDOMEN: soft, nontender, no hepatosplenomegaly, no masses and bowel sounds normal  MS: 1+ lower EXTR edema.  There are some chronic disease changes and mild erythema bilaterally.  No open sores/wounds in his legs.  SKIN: no suspicious lesions or rashes  NEURO: Normal strength and tone, speech normal  PSYCH: He appears moderately fatigued and slightly impaired.

## 2023-08-07 ENCOUNTER — TELEPHONE (OUTPATIENT)
Dept: FAMILY MEDICINE | Facility: CLINIC | Age: 68
End: 2023-08-07
Payer: COMMERCIAL

## 2023-08-07 ENCOUNTER — MYC MEDICAL ADVICE (OUTPATIENT)
Dept: FAMILY MEDICINE | Facility: CLINIC | Age: 68
End: 2023-08-07
Payer: COMMERCIAL

## 2023-08-07 DIAGNOSIS — F31.76 DEPRESSED BIPOLAR I DISORDER IN FULL REMISSION (H): ICD-10-CM

## 2023-08-07 RX ORDER — DULOXETIN HYDROCHLORIDE 30 MG/1
30 CAPSULE, DELAYED RELEASE ORAL 2 TIMES DAILY
Qty: 180 CAPSULE | Refills: 3 | Status: SHIPPED | OUTPATIENT
Start: 2023-08-07 | End: 2023-08-25

## 2023-08-07 NOTE — ORAL ONC MGMT
I am in process of applying to the Cymbalta assistance program.  Maria De Jesus Kwon requires a hand signed, brand name, hard copy script be submitted with their application.    Please hand sign a BRAND name, hard copy script for:       CYMBALTA    Please send the HARD COPY script to me at--     via interoffice mail  Barb Ingram     or via US mail  at:   [facility]  Barb Huerta  [address] [place]  [address]    Thanks so much for your help!    Barb Huerta  Prescription Assistance Supervisor  Pharmacy Assistance

## 2023-08-07 NOTE — TELEPHONE ENCOUNTER
MAAME- I have submitted Michaelas [employer] and Lantus Solostar assistance applications to the [employer] and [employer] assistance programs.    I will note Epic when I have a decision from each .    Thanks so much for your help!    Barb Huerta  Prescription Assistance Supervisor  Pharmacy Assistance

## 2023-08-08 DIAGNOSIS — G47.33 OBSTRUCTIVE SLEEP APNEA SYNDROME: ICD-10-CM

## 2023-08-08 RX ORDER — MODAFINIL 200 MG/1
300 TABLET ORAL DAILY
Qty: 45 TABLET | Refills: 0 | Status: SHIPPED | OUTPATIENT
Start: 2023-08-08 | End: 2023-09-18

## 2023-08-14 ENCOUNTER — TELEPHONE (OUTPATIENT)
Dept: FAMILY MEDICINE | Facility: CLINIC | Age: 68
End: 2023-08-14

## 2023-08-14 ENCOUNTER — TELEPHONE (OUTPATIENT)
Dept: FAMILY MEDICINE | Facility: CLINIC | Age: 68
End: 2023-08-14
Payer: COMMERCIAL

## 2023-08-14 ENCOUNTER — NURSE TRIAGE (OUTPATIENT)
Dept: NURSING | Facility: CLINIC | Age: 68
End: 2023-08-14

## 2023-08-14 NOTE — TELEPHONE ENCOUNTER
Pt calling to schedule a follow-up visit to discuss staring Trulicity and recheck his O2. Routed to central scheduling to make appt    Myriam HARRISON RN  [facility][note 3]

## 2023-08-14 NOTE — TELEPHONE ENCOUNTER
DE,      Please see message below.  There is also a Nurse Triage note from this am as well.      Thank you,  Wandy Pena RN

## 2023-08-14 NOTE — TELEPHONE ENCOUNTER
I agree that he should have this evaluated.  If he does not want to go to the ER I would suggest that he go to an urgent care so that he can be evaluated and have his oxygen levels checked.  Thank you, DE

## 2023-08-14 NOTE — TELEPHONE ENCOUNTER
Patient calling with concerns of oxygen level running 84-95%.  On the phone with patient his oxygen level fluctuating between 84-90 when sitting. Patient got up to check his color and it went to 92 briefly but then back down to 89-90.  Denies chest pain, confusion, shortness of breath.   Protocol recommends ED now  Encouraged patient to have another adult drive.   Patient states he will present to [facility].  If symptoms worsen patient to call 911  Juany Wolff RN   08/14/23 8:58 AM  [facility] Nurse Advisor      Reason for Disposition   Oxygen level (e.g., pulse oximetry) 90 percent or lower    Additional Information   Negative: SEVERE difficulty breathing (e.g., struggling for each breath, speaks in single words, pulse > 120)   Negative: Breathing stopped and hasn't returned   Negative: Choking on something   Negative: Bluish (or gray) lips or face   Negative: Difficult to awaken or acting confused (e.g., disoriented, slurred speech)   Negative: Passed out (i.e., fainted, collapsed and was not responding)   Negative: Wheezing started suddenly after medicine, an allergic food, or bee sting   Negative: Stridor   Negative: Slow, shallow and weak breathing   Negative: Sounds like a life-threatening emergency to the triager   Negative: Chest pain   Negative: Wheezing (high pitched whistling sound) and previous asthma attacks or use of asthma medicines   Negative: Difficulty breathing and within 14 days of COVID-19 Exposure   Negative: Difficulty breathing and only present when coughing   Negative: Difficulty breathing and only from stuffy nose   Negative: Difficulty breathing and only from stuffy nose or runny nose from common cold   Negative: MODERATE difficulty breathing (e.g., speaks in phrases, SOB even at rest, pulse 100-120) of new-onset or worse than normal    Protocols used: Breathing Difficulty-A-OH

## 2023-08-14 NOTE — TELEPHONE ENCOUNTER
Reason for Call:  Other call back    Detailed comments: patient called and states would like an appointment to check oxygen level.  Was told to go to the E/R but declining.    Patient states that should not take medication Trulicity per Radha Martinez but was also told by a hospitalist provider to not take medication.    Discuss fatigue.    Please contact patient today.  Thank you.    Phone Number Patient can be reached at: Cell number on file:    Telephone Information:   Mobile [phone]       Best Time: any    Can we leave a detailed message on this number? YES    Call taken on 8/14/2023 at 8:44 AM by Inna Alexander

## 2023-08-16 ENCOUNTER — TELEPHONE (OUTPATIENT)
Dept: FAMILY MEDICINE | Facility: CLINIC | Age: 68
End: 2023-08-16
Payer: COMMERCIAL

## 2023-08-16 NOTE — TELEPHONE ENCOUNTER
Erickson has been approved to the [employer] assistance program for Lantus Solostar through December 2023. He will receive this medication at no cost through the enrollment period.    A  90 day supply of Lantus Solostar pens will be delivered to the [facility] within 3-5 business days. *Please contact Erickson when this arrives to , has been informed of this approval.    [employer] automatically delivers refills in a 90 day supply to the Clinic.    Thanks so much for your help!    Barb Huerta  Prescription Assistance Supervisor  Pharmacy Assistance

## 2023-08-17 ENCOUNTER — TRANSFERRED RECORDS (OUTPATIENT)
Dept: HEALTH INFORMATION MANAGEMENT | Facility: CLINIC | Age: 68
End: 2023-08-17
Payer: COMMERCIAL

## 2023-08-17 NOTE — TELEPHONE ENCOUNTER
FYI: It looks like this patient is going to be having medications delivered to our clinic.  We will need to contact him once these arrive.  Thank you, DE

## 2023-08-18 NOTE — TELEPHONE ENCOUNTER
FS (DOD),  Please see below MyChart message and advise.  Please see ER encounter related to fluctuating O2 saturation.  Thanks,  Aggie CAZARES RN

## 2023-08-20 NOTE — TELEPHONE ENCOUNTER
Advise patient to schedule an appointment with Dr. Garcia if shortness of breath persisted.  They will discuss COVID booster at that time.  MD Eliane

## 2023-08-22 ENCOUNTER — TRANSFERRED RECORDS (OUTPATIENT)
Dept: HEALTH INFORMATION MANAGEMENT | Facility: CLINIC | Age: 68
End: 2023-08-22
Payer: COMMERCIAL

## 2023-08-23 NOTE — TELEPHONE ENCOUNTER
Called patient.  Informed him Edith arrived at clinic  Patient states his legs have been very stiff lately.  Scheduled patient to see DE 8/28.    Patient states he needs refill for Pregabalin.  Informed patient should have refill available.  Rx sent 7/5/23 for #30 with 2 refills  Advised patient call pharmacy.    Verbalizes understanding.    Wandy Pena RN

## 2023-08-25 DIAGNOSIS — F31.76 DEPRESSED BIPOLAR I DISORDER IN FULL REMISSION (H): ICD-10-CM

## 2023-08-25 NOTE — TELEPHONE ENCOUNTER
-- 2nd Request-- Original Request Aug. 7, 2023    I am in process of applying to the Cymbalta assistance program.  TM requires a hand signed, brand name, hard copy script be submitted with their application.    Please hand sign a BRAND name, hard copy script for:      CYMBALTA 30mg    Please send the HARD COPY script to me at--  I must submit this script with the [employer] De [employer] application.    via interoffice mail  [employer] Barb Navarro     or via US mail  at:   [facility]  Barb Huerta  [address] [place]  [address]    Thanks so much for your help!    Barb Huerta  Prescription Assistance Supervisor  Pharmacy Assistance

## 2023-08-27 RX ORDER — DULOXETIN HYDROCHLORIDE 30 MG/1
30 CAPSULE, DELAYED RELEASE ORAL 2 TIMES DAILY
Qty: 180 CAPSULE | Refills: 3 | Status: SHIPPED | OUTPATIENT
Start: 2023-08-27 | End: 2023-08-28

## 2023-08-28 ENCOUNTER — OFFICE VISIT (OUTPATIENT)
Dept: URGENT CARE | Facility: URGENT CARE | Age: 68
End: 2023-08-28
Payer: COMMERCIAL

## 2023-08-28 ENCOUNTER — NURSE TRIAGE (OUTPATIENT)
Dept: NURSING | Facility: CLINIC | Age: 68
End: 2023-08-28
Payer: COMMERCIAL

## 2023-08-28 VITALS
DIASTOLIC BLOOD PRESSURE: 86 MMHG | TEMPERATURE: 98.7 F | WEIGHT: 252 LBS | SYSTOLIC BLOOD PRESSURE: 138 MMHG | OXYGEN SATURATION: 99 % | BODY MASS INDEX: 40.5 KG/M2 | HEART RATE: 81 BPM | HEIGHT: 66 IN

## 2023-08-28 DIAGNOSIS — F31.76 DEPRESSED BIPOLAR I DISORDER IN FULL REMISSION (H): ICD-10-CM

## 2023-08-28 DIAGNOSIS — J02.9 ACUTE PHARYNGITIS, UNSPECIFIED ETIOLOGY: Primary | ICD-10-CM

## 2023-08-28 LAB — DEPRECATED S PYO AG THROAT QL EIA: NEGATIVE

## 2023-08-28 PROCEDURE — 87651 STREP A DNA AMP PROBE: CPT | Performed by: PHYSICIAN ASSISTANT

## 2023-08-28 PROCEDURE — 99213 OFFICE O/P EST LOW 20 MIN: CPT | Performed by: PHYSICIAN ASSISTANT

## 2023-08-28 RX ORDER — AMOXICILLIN 875 MG
875 TABLET ORAL 2 TIMES DAILY
Qty: 20 TABLET | Refills: 0 | Status: SHIPPED | OUTPATIENT
Start: 2023-08-28 | End: 2023-08-31

## 2023-08-28 RX ORDER — DULOXETIN HYDROCHLORIDE 30 MG/1
30 CAPSULE, DELAYED RELEASE ORAL 2 TIMES DAILY
Qty: 180 CAPSULE | Refills: 3 | Status: SHIPPED | OUTPATIENT
Start: 2023-08-28 | End: 2023-09-06

## 2023-08-28 NOTE — TELEPHONE ENCOUNTER
"Call from patient who reports a sore throat and white stripes in the back X 2-3 days. Pain level 4/10; just took some Tylenol. No fever or problems w/ breathing/swallowing.    Patient reports he is immune-compromised.    Disposition: be seen w/I 24 hrs  Reviewed care advise with patient. Informed to call back w/ any questions or new concerns.  Caller agrees with advise/plan.      Anitra Dunaway RN, BSN  Triage Nurse Advisor           Reason for Disposition   Diabetes mellitus or weak immune system (e.g., HIV positive, cancer chemo, splenectomy, organ transplant, chronic steroids)    Additional Information   Negative: SEVERE difficulty breathing (e.g., struggling for each breath, speaks in single words, stridor)   Negative: Sounds like a life-threatening emergency to the triager   Negative: [1] Drooling or spitting out saliva (because can't swallow) AND [2] normal breathing   Negative: Unable to open mouth completely   Negative: [1] Difficulty breathing AND [2] not severe   Negative: Fever > 104 F (40 C)   Negative: [1] Refuses to drink anything AND [2] for > 12 hours   Negative: [1] Drinking very little AND [2] dehydration suspected (e.g., no urine > 12 hours, very dry mouth, very lightheaded)   Negative: Patient sounds very sick or weak to the triager   Negative: SEVERE (e.g., excruciating) throat pain   Negative: [1] Pus on tonsils (back of throat) AND [2]  fever AND [3] swollen neck lymph nodes (\"glands\")   Negative: [1] Rash AND [2] widespread (especially chest and abdomen)   Negative: Earache also present   Negative: Fever present > 3 days (72 hours)    Protocols used: Sore Throat-A-AH    "

## 2023-08-29 LAB — GROUP A STREP BY PCR: NOT DETECTED

## 2023-08-29 NOTE — PATIENT INSTRUCTIONS
"Antibiotics as directed- amoxicillin twice daily for 10 days.  Drink plenty of fluids and rest.  May use salt water gargles- about 8 oz warm water with about 1 teaspoon salt  Sucrets and Cepacol spray are over the counter medications that numb the throat.  Over the counter pain relievers such as tylenol or ibuprofen may be used as needed.   Honey lemon tea helps to soothe the throat. \"Throat Coat\" tea is soothing as well.  Change toothbrush after 24 hours of antibiotics (may soak in 3-6% hydrogen peroxide)  Will be contagious for 24 hours after starting antibiotic  May return to school//work/activities 24 hours after antibiotics are started.  Wash hands frequently and do not share beverages.  Please follow up with primary care provider if symptoms are not improving, worsening or new symptoms or for any adverse reactions to medications.     "

## 2023-08-29 NOTE — PROGRESS NOTES
"Assessment & Plan     Acute pharyngitis, unspecified etiology  - Streptococcus A Rapid Screen w/Reflex to PCR - Clinic Collect  - Group A Streptococcus PCR Throat Swab  - amoxicillin (AMOXIL) 875 MG tablet; Take 1 tablet (875 mg) by mouth 2 times daily for 10 days    Given significant past medical history, will treat with amoxicillin twice daily for 10 days.    Return in about 1 week (around 9/4/2023) for visit with primary care provider if not improving.     ITZ [facility]    Subjective   Parmjit Hernández is a 68 year old who presents for the following health issues     Patient presents with:  Pharyngitis    HPI    Erickson presents for evaluation of a sore throat. He states he got strep once a year for about 10 years. In the last 20 years he has not had strep. Three days ago he developed pain when swallowing, lost his voice and saw white spots in his throat. Symptoms are gradually getting worse. No fever. Minimal lymph node tenderness    Review of Systems   ROS negative except as stated above.      Objective    /86 (BP Location: Right arm, Patient Position: Sitting, Cuff Size: Adult Large)   Pulse 81   Temp 98.7  F (37.1  C) (Tympanic)   Ht 1.676 m (5' 6\")   Wt 114.3 kg (252 lb)   HC 18 cm (7.09\")   SpO2 99%   BMI 40.67 kg/m    Physical Exam   GENERAL: healthy, alert and no distress  EYES: Eyes grossly normal to inspection, PERRL and conjunctivae and sclerae normal  HENT: ear canals and TM's normal, nose without ulcers or lesions, posterior pharynx erythematous with yellow green post nasal drip, tonsils with 2+ hypertrophy and minimal white exudate bilaterally  NECK: no adenopathy, no asymmetry, masses, or scars and thyroid normal to palpation  RESP: lungs clear to auscultation - no rales, rhonchi or wheezes  CV: regular rate and rhythm, normal S1 S2, no S3 or S4, no murmur, click or rub, no peripheral edema and peripheral pulses strong    Results for orders " placed or performed in visit on 08/28/23   Streptococcus A Rapid Screen w/Reflex to PCR - Clinic Collect     Status: Normal    Specimen: Throat; Swab   Result Value Ref Range    Group A Strep antigen Negative Negative

## 2023-08-31 ENCOUNTER — OFFICE VISIT (OUTPATIENT)
Dept: FAMILY MEDICINE | Facility: CLINIC | Age: 68
End: 2023-08-31
Payer: COMMERCIAL

## 2023-08-31 VITALS
OXYGEN SATURATION: 97 % | SYSTOLIC BLOOD PRESSURE: 125 MMHG | HEIGHT: 66 IN | DIASTOLIC BLOOD PRESSURE: 85 MMHG | BODY MASS INDEX: 41.3 KG/M2 | TEMPERATURE: 97.8 F | WEIGHT: 257 LBS | RESPIRATION RATE: 16 BRPM | HEART RATE: 82 BPM

## 2023-08-31 DIAGNOSIS — R60.0 BILATERAL LOWER EXTREMITY EDEMA: Primary | ICD-10-CM

## 2023-08-31 DIAGNOSIS — G47.33 OBSTRUCTIVE SLEEP APNEA SYNDROME: ICD-10-CM

## 2023-08-31 DIAGNOSIS — E66.01 MORBID OBESITY (H): ICD-10-CM

## 2023-08-31 DIAGNOSIS — R53.82 CHRONIC FATIGUE: ICD-10-CM

## 2023-08-31 DIAGNOSIS — E11.9 TYPE 2 DIABETES MELLITUS WITHOUT COMPLICATION, UNSPECIFIED WHETHER LONG TERM INSULIN USE (H): ICD-10-CM

## 2023-08-31 DIAGNOSIS — G62.9 PERIPHERAL POLYNEUROPATHY: ICD-10-CM

## 2023-08-31 DIAGNOSIS — G89.4 CHRONIC PAIN SYNDROME: ICD-10-CM

## 2023-08-31 DIAGNOSIS — M48.061 SPINAL STENOSIS AT L4-L5 LEVEL: ICD-10-CM

## 2023-08-31 DIAGNOSIS — I87.2 VENOUS STASIS DERMATITIS OF BOTH LOWER EXTREMITIES: ICD-10-CM

## 2023-08-31 PROCEDURE — 99215 OFFICE O/P EST HI 40 MIN: CPT | Performed by: FAMILY MEDICINE

## 2023-08-31 ASSESSMENT — ANXIETY QUESTIONNAIRES
2. NOT BEING ABLE TO STOP OR CONTROL WORRYING: SEVERAL DAYS
GAD7 TOTAL SCORE: 2
GAD7 TOTAL SCORE: 2
4. TROUBLE RELAXING: NOT AT ALL
GAD7 TOTAL SCORE: 2
3. WORRYING TOO MUCH ABOUT DIFFERENT THINGS: NOT AT ALL
6. BECOMING EASILY ANNOYED OR IRRITABLE: NOT AT ALL
8. IF YOU CHECKED OFF ANY PROBLEMS, HOW DIFFICULT HAVE THESE MADE IT FOR YOU TO DO YOUR WORK, TAKE CARE OF THINGS AT HOME, OR GET ALONG WITH OTHER PEOPLE?: SOMEWHAT DIFFICULT
7. FEELING AFRAID AS IF SOMETHING AWFUL MIGHT HAPPEN: NOT AT ALL
IF YOU CHECKED OFF ANY PROBLEMS ON THIS QUESTIONNAIRE, HOW DIFFICULT HAVE THESE PROBLEMS MADE IT FOR YOU TO DO YOUR WORK, TAKE CARE OF THINGS AT HOME, OR GET ALONG WITH OTHER PEOPLE: SOMEWHAT DIFFICULT
5. BEING SO RESTLESS THAT IT IS HARD TO SIT STILL: NOT AT ALL
7. FEELING AFRAID AS IF SOMETHING AWFUL MIGHT HAPPEN: NOT AT ALL
1. FEELING NERVOUS, ANXIOUS, OR ON EDGE: SEVERAL DAYS

## 2023-08-31 ASSESSMENT — PATIENT HEALTH QUESTIONNAIRE - PHQ9
SUM OF ALL RESPONSES TO PHQ QUESTIONS 1-9: 7
10. IF YOU CHECKED OFF ANY PROBLEMS, HOW DIFFICULT HAVE THESE PROBLEMS MADE IT FOR YOU TO DO YOUR WORK, TAKE CARE OF THINGS AT HOME, OR GET ALONG WITH OTHER PEOPLE: NOT DIFFICULT AT ALL
SUM OF ALL RESPONSES TO PHQ QUESTIONS 1-9: 7

## 2023-08-31 ASSESSMENT — PAIN SCALES - GENERAL: PAINLEVEL: MODERATE PAIN (4)

## 2023-08-31 ASSESSMENT — ENCOUNTER SYMPTOMS: DIARRHEA: 1

## 2023-08-31 NOTE — PROGRESS NOTES
DME (Durable Medical Equipment) Orders and Documentation  Orders Placed This Encounter   Procedures    Compression Sleeve/Stocking Order        The patient was assessed and it was determined the patient is in need of the following listed DME Supplies/Equipment. Please complete supporting documentation below to demonstrate medical necessity.            Answers submitted by the patient for this visit:  Patient Health Questionnaire (Submitted on 8/31/2023)  If you checked off any problems, how difficult have these problems made it for you to do your work, take care of things at home, or get along with other people?: Not difficult at all  PHQ9 TOTAL SCORE: 7  NANCY-7 (Submitted on 8/31/2023)  NANCY 7 TOTAL SCORE: 2  General Questionnaire (Submitted on 8/31/2023)  Chief Complaint: Chronic problems general questions HPI Form  What is the reason for your visit today? : shortness of breath  How many servings of fruits and vegetables do you eat daily?: 0-1  On average, how many sweetened beverages do you drink each day (Examples: soda, juice, sweet tea, etc.  Do NOT count diet or artificially sweetened beverages)?: 0  How many minutes a day do you exercise enough to make your heart beat faster?: 9 or less  How many days a week do you exercise enough to make your heart beat faster?: 3 or less  How many days per week do you miss taking your medication?: 2

## 2023-08-31 NOTE — PROGRESS NOTES
Assessment & Plan     Bilateral lower extremity edema  It seems like the lower extremity edema issue is causing the tightness in his calves.  He was given a prescription for new compression stockings to see if this helps.  He will continue the hydrochlorothiazide and elevate the legs periodically throughout the day to see if this helps.  We may need to consider a loop diuretic in the future.  - Compression Sleeve/Stocking Order    Venous stasis dermatitis of both lower extremities    Peripheral polyneuropathy    Chronic fatigue  This has been an issue for him for 20+ years.  I recommended that he start using CPAP again since he has been off of it since February.    Obstructive sleep apnea syndrome  I recommended that he start using CPAP again since he has been off of it since February.    Type 2 diabetes mellitus without complication, unspecified whether long term insulin use (H)  Did increasing the insulin to 40 units daily since he is still having blood glucose readings consistently in the mid 200s.  - insulin glargine (LANTUS PEN) 100 UNIT/ML pen; Inject 40 Units Subcutaneous At Bedtime    Morbid obesity (H)  Continue work on lifestyle modifications help with weight loss.    Chronic pain syndrome  He will continue to follow closely at the pain clinic.    Spinal stenosis at L4-L5 level  I filled out handicap parking forms for him so that he may get handicap sticker.      40 minutes spent by me on the date of the encounter doing chart review, review of outside records, review of test results, interpretation of tests, patient visit, and documentation              Sherwin Mcdermott DO  [facility]    Lavinia Almendarez is a 68 year old, presenting for the following health issues:  Forms (HANDICAP STICKER), Swelling, and Diarrhea        8/31/2023    12:59 PM   Additional Questions   Roomed by giulia haque       History of Present Illness       Reason for visit:  Shortness of breath    He eats  0-1 servings of fruits and vegetables daily.He consumes 0 sweetened beverage(s) daily.He exercises with enough effort to increase his heart rate 9 or less minutes per day.  He exercises with enough effort to increase his heart rate 3 or less days per week. He is missing 2 dose(s) of medications per week.               He presents to the clinic today to discuss if ongoing issues with fatigue.  This has been a problem for him for the past 20+ years.  Earlier this month he was seen for difficulty breathing and went to the emergency department.  At that time his labs showed a high glucose of 282, BUN 23, BNP 16 and they did a chest x-ray which showed no acute findings.  He feels like the dyspnea symptoms are improving.  Occasionally his oxygen saturation is still in the mid-upper 80s when he wakes up, but this tends to quickly improve into the mid 90s with deep breaths.  He reports that he has not been using CPAP since moving to the [facility] this last February.    He is also concerned about tightness in his calves, worse on the left side.  He had a he has a known history of lower extremity edema and peripheral vascular disease.  He uses compression stockings which provide some benefit, but there is still room for improvement.  He denies any specific injury that may have caused the tightness symptoms.  He feels like it may be a muscular problem.  Symptoms do not worsen with walking or exercise.  He denies having significant pain in his calves.  He has not noticed significant redness and there are no current open wounds, although he does have some psoriasis lesions scattered on his body.    He reports being seen at an urgent care for sore throat symptoms few days ago.  They prescribed amoxicillin which he decided to stop because he was having diarrhea symptoms.  His rapid strep test and strep culture results were negative.  He is hoping that he can stay off of it.    He has a complex medical history significant for  "poorly controlled type 2 diabetes, peripheral neuropathy, peripheral vascular disease, coronary artery disease, history of NSTEMI with stent placement, amyloidosis, history of peripheral stem cell transplant, thrombocytopenia, obesity, fatty cirrhosis of the liver, bipolar disorder, migraine headaches, chronic pain syndrome, hypertension, hyperlipidemia and obstructive sleep apnea.  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     Review of Systems   Gastrointestinal:  Positive for diarrhea.      Constitutional, HEENT, cardiovascular, pulmonary, GI, , musculoskeletal, neuro, skin, endocrine and psych systems are negative, except as otherwise noted.      Objective    /85   Pulse 82   Temp 97.8  F (36.6  C) (Temporal)   Resp 16   Ht 1.676 m (5' 6\")   Wt 116.6 kg (257 lb)   SpO2 97%   BMI 41.48 kg/m    Body mass index is 41.48 kg/m .  Physical Exam   GENERAL: healthy, alert and no distress  EYES: Eyes grossly normal to inspection, PERRL and conjunctivae and sclerae normal  HENT: The throat is mildly erythematous with mucus in the posterior oropharynx.  NECK: no adenopathy, no asymmetry, masses, or scars and thyroid normal to palpation  RESP: lungs clear to auscultation - no rales, rhonchi or wheezes  CV: regular rate and rhythm, normal S1 S2, no S3 or S4, no murmur, click or rub, no peripheral edema and peripheral pulses strong  ABDOMEN: soft, nontender, no hepatosplenomegaly, no masses and bowel sounds normal  MS: no gross musculoskeletal defects noted.  1+ bilateral lower extremity edema.  Homans' sign negative.  He ambulates with a cane.  SKIN: There are several circular psoriasis lesions scattered on his arms and legs.  NEURO: Normal strength and tone, mentation intact and speech normal  PSYCH: mentation appears normal, affect normal/bright                      "

## 2023-09-05 NOTE — PROGRESS NOTES
PATIENT/VISITOR WELLNESS SCREENING    Step 1 Patient Screening    1. In the last month, have you been in contact with someone who was confirmed or suspected to have Coronavirus/COVID-19? No    2. Do you have the following symptoms?  Fever/Chills? No   Cough? No   Shortness of breath? No   New loss of taste or smell? No  Sore throat? No  Muscle or body aches? No  Headaches? No  Fatigue? No  Vomiting or diarrhea? No    Step 2 Visitor Screening    1. Name of Visitor (1 visitor per patient): none    Step 3 Refer to logic grid below for actions    NO SYMPTOM(S)    ACTIONS:  1. Standard rooming process  2. Provider to assess per normal protocol  3. Implement precautions as needed and per guidelines     POSITIVE SYMPTOM(S)  If positive for ANY of the following symptoms: fever, cough, shortness of breath, rash    ACTION:  1. Continue to have the patient wear a mask   2. Room patient as soon as possible  3. Don appropriate PPE when entering room  4. Provider evaluation     36.8

## 2023-09-06 DIAGNOSIS — F31.76 DEPRESSED BIPOLAR I DISORDER IN FULL REMISSION (H): ICD-10-CM

## 2023-09-06 RX ORDER — DULOXETIN HYDROCHLORIDE 30 MG/1
30 CAPSULE, DELAYED RELEASE ORAL 2 TIMES DAILY
Qty: 180 CAPSULE | Refills: 3 | Status: SHIPPED | OUTPATIENT
Start: 2023-09-06 | End: 2023-09-13

## 2023-09-06 NOTE — TELEPHONE ENCOUNTER
This was recently approved, but it looks like it was locally printed out. Can you send here to [facility]? Thanks!

## 2023-09-12 NOTE — PROGRESS NOTES
"[facility]/[place]  Section of General Neurology  New Patient Visit      Parmjit Hernández MRN# [id number]   Age: 68 year old YOB: 1955              Assessment and Plan:   Assessment:  Erickson Hernández is a pleasant 68 year old man who presents in consultation for memory changes.  He feels his memory has improved since he booked the visit which is great news. We reviewed his MRI which shows age appropriate changes, no disproportionate hippocampal atrophy.  He scored quite well on testing today.  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  In totality I do not see evidence of a dementia syndrome.  Improved sleep could improve memory further which we discussed (He has not been using CPAP since a move from [place] e.g. (lost in the move).       Plan:  --No evidence of a dementia syndrome as above at this time.    --Pituitary enlargement seen going back to ~2020 scan.  Should continue to monitor over time perhaps in ~ year from last scan and space out farther from there pending stability.    --Recommend yearly eye exams in this regard, neurosurgical referral with any growth  --Follow up with general neurology can be PRN for now          Christopher Jose MD   of Neurology   [facility]      History of Presenting Symptoms:   Parmjit Hernández is a 68 year old male who presents today for evaluation of memory changes.   He has a PMH of T2DM, migraine headaches, abdominal pain, RLS, amyloidosis, bipolar disorder, OCD    Memory is getting better  For a while short term memory was worsening but is better.    He used to have chronic migraines but these are improved    --Sleep:/NORMA --CPAP--\"horseshit\" sleep, due to pain  Doesn't have CPAP from move from [place], needs to get this.    --Mood: doing OK  --ADLs: pay bills OK   --Drinking/drug use: not currently  --Family history: dad had brain bleed  --Driving--going OK.      He lives " in [place]  Works as an  trying to unite companies with [employer]   Is permanently disabled he notes from back, amyloid, chronic pain is disabled.      It appears he previously followed with neurology for chronic fatigue, diffuse weakness though I cannot see this note fully.        Recent PCP note reviewed (Dr. Radha Mcdermott)   Chronic fatigue  His chronic issues with fatigue that he has had over the past several years is likely multifactorial.  He has several chronic medical conditions that are likely contributing to this.  I expressed my concern that his oxygen saturation is pretty low today.  We had a monitor in the room and it was running in the upper 80s-mid 90s.  He did not appear acutely short of breath and his lungs sounded clear.  I recommended checking a chest x-ray in the office today.  I personally reviewed the images with him and provided an initial interpretation.  There is some evidence of cardiomegaly which appears similar to previous x-rays.  There is no acute infiltrates or effusions present.  The formal radiology report is pending.  I recommended that he keep monitoring his oxygen saturation at home with his oximeter.  I also recommended that he start taking the Lantus insulin regularly.  We decided to increase the dose to 30 units daily and I suggested he take this once daily instead of splitting up the dose since he is having difficulties with remembering to take this regularly.  He will be due to have his A1c rechecked in a couple of weeks.  He will continue to follow closely with his pain specialist.  He appears to be a little overmedicated today which may be contributing to the low oxygen saturation numbers we are seeing.  He reports that he is taking his pain medications as prescribed.     Hypoxia  - XR Chest 2 Views; Future  - BNP-N terminal pro; Future  - Basic metabolic panel  (Ca, Cl, CO2, Creat, Gluc, K, Na, BUN); Future  - CBC with platelets; Future  - BNP-N  terminal pro  - Basic metabolic panel  (Ca, Cl, CO2, Creat, Gluc, K, Na, BUN)  - CBC with platelets     Type 2 diabetes mellitus without complication, unspecified whether long term insulin use (H)  - insulin glargine (LANTUS PEN) 100 UNIT/ML pen; Inject 30 Units Subcutaneous At Bedtime     Morbid obesity (H)     Spinal stenosis at L4-L5 level     Chronic pain syndrome     Post-traumatic osteoarthritis of left shoulder     Dyspnea, unspecified type  - BNP-N terminal pro; Future  - BNP-N terminal pro       Past Medical History:     Patient Active Problem List   Diagnosis    Low back pain    Hyperlipidemia    Hypertension goal BP (blood pressure) < 140/90    Type 2 diabetes mellitus (H)    Advanced directives, counseling/discussion    Migraine headache    History of diverticulitis    MENTAL HEALTH    Generalized abdominal pain    Light chain (AL) amyloidosis (H)    Insomnia due to medical condition    Obstructive sleep apnea syndrome    Restless legs syndrome (RLS)    Leg edema    Morbid obesity (H)    Venous stasis dermatitis of both lower extremities    Polyneuropathy, unspecified    Acquired lymphedema    Low blood pressure reading    History of peripheral stem cell transplant (H)    Other specified anxiety disorders    Abnormal electrocardiography    Bilateral leg edema    Onychomycosis    Thrombocytopenia, unspecified (H)    Bilateral leg weakness    NSTEMI (non-ST elevated myocardial infarction) (H)    Essential hypertension    Peripheral vascular disease (H)    Other cirrhosis of liver (H)    Lymphedema    Cellulitis of right lower extremity    Right upper quadrant pain    Abnormal computed tomography scan    Acquired pancytopenia (H)    Benign neoplasm of cecum    Benign neoplasm of transverse colon    Benign prostatic hyperplasia    Bilateral cataracts    Bipolar 1 disorder, manic, mild (H)    Cannabis dependence in remission (H)    Cardiovascular stress test abnormal    Chronic fatigue, unspecified     Continuous opioid dependence (H)    Contusion of rib    Coronary arteriosclerosis    DDD (degenerative disc disease), lumbar    Decreased testosterone level    Depressed bipolar I disorder in full remission (H)    Chronic diarrhea    Hernia of anterior abdominal wall    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)    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)    History of anaphylaxis due to severe acute respiratory syndrome coronavirus 2 (SARS-CoV-2) vaccine    History of colonic polyps    Hypertensive renal disease    Hypocalcemia    Immunoglobulin deficiency (H)    Localized enlarged lymph nodes    Nonalcoholic steatohepatitis    Noninfectious gastroenteritis    Other muscle spasm    Polyp of colon    Presence of implanted infusion pump    Retention of urine    Spinal stenosis at L4-L5 level    Chronic pain    Cerebral infarction, unspecified (H)    Abdominal pain, generalized    Nausea and vomiting, unspecified vomiting type     Past Medical History:   Diagnosis Date    Acquired lymphedema 2/4/2019    Allergic state     Amyloidosis (H)     followed by hematology Dr. Romeo status post peripheral stem cell transplant    Anxiety 5/11/2016    Anxiety and depression 12/18/2013    Bipolar I disorder (H) 9/1/2015    Under care of psychiatrist [facility]- Dr Rahman doxepin 25 mg, 2 cap bedtime DULoxetine 20 mg once daily  OLANZapine 7.5 mg  1 tab bedtime     DDD (degenerative disc disease), lumbar 8/6/2020    Depressive disorder 1995    EKG abnormality 4/20/2020    H/O bone marrow transplant (H)     Headache(784.0)     History of diverticulitis 1/27/2015    1/15-rxed with antibiotics     Hyperlipidemia LDL goal <100 10/31/2010    Hypertension 2007    Hypertension goal BP (blood pressure) < 140/90 10/11/2011    Marianne (H) 8/20/2015    Morbid obesity (H) 8/28/2018    Myalgia and myositis, unspecified     Narcotic dependence (H) 9/6/2016    None in about 6 months- 8/1/17    NSTEMI (non-ST elevated myocardial infarction)  (H) 04/19/2020    OCD (obsessive compulsive disorder)     Other acquired absence of organ 94    Other cirrhosis of liver (H) possibly from vences  4/15/2020    Other specified viral warts     Peripheral edema 5/20/2018    Noncardiac Continue with  furosemide (LASIX) 20 MG tablet,  potassium       chloride SA (K-DUR/KLOR-CON M) 10 MEQ CR  Tab once daily  Basic metabolic panel    Polyneuropathy associated with underlying disease (H) 2/4/2019    Restless legs syndrome (RLS) 6/29/2017    Stasis dermatitis of both legs 12/31/2018    Type 2 diabetes mellitus with other specified complication (H) 7/10/2017        Past Surgical History:     Past Surgical History:   Procedure Laterality Date    ABDOMEN SURGERY  2007    abdominal hernia    BACK SURGERY  8/6/2020    BIOPSY  june 2015    negative    BMT PROTOCOL      for systemic amyloidosis    BONE MARROW BIOPSY, BONE SPECIMEN, NEEDLE/TROCAR N/A 6/8/2016    Procedure: BIOPSY BONE MARROW;  Surgeon: Nathan Agrawal MD;  Location:  GI    BONE MARROW BIOPSY, BONE SPECIMEN, NEEDLE/TROCAR N/A 2/20/2019    Procedure: BIOPSY BONE MARROW;  Surgeon: Michael Raygoza MD;  Location:  GI    CHOLECYSTECTOMY  1995    lap qian    COLONOSCOPY  2012    hx polyps    ESOPHAGOSCOPY, GASTROSCOPY, DUODENOSCOPY (EGD), COMBINED N/A 5/29/2015    Procedure: COMBINED ESOPHAGOSCOPY, GASTROSCOPY, DUODENOSCOPY (EGD), BIOPSY SINGLE OR MULTIPLE;  Surgeon: John Jacob MD;  Location:  GI    HERNIA REPAIR, UMBILICAL  2006    [facility] NONSPECIFIC PROCEDURE  94    Cholecystectomy    [facility] NONSPECIFIC PROCEDURE  2000    repair deviated septum        Social History:     Social History     Tobacco Use    Smoking status: Never    Smokeless tobacco: Never   Vaping Use    Vaping Use: Never used   Substance Use Topics    Alcohol use: Not Currently     Alcohol/week: 0.0 standard drinks of alcohol    Drug use: Not Currently     Types: Cocaine, Marijuana, Methamphetamines, Opiates, IV,  Amphetamines, Barbiturates, Benzodiazepines, Codeine, Fentanyl, Hashish, Hydrocodone, Hydromorphone, LSD, Oxycodone        Family History:     Family History   Problem Relation Age of Onset    Cerebrovascular Disease Mother     C.A.D. Mother     Hypertension Mother     Alcohol/Drug Mother     Arthritis Mother     Cerebrovascular Disease Maternal Grandmother     C.A.D. Maternal Grandmother     Alcohol/Drug Maternal Grandmother     C.A.D. Father     Heart Disease Father     Hypertension Father     Alcohol/Drug Father     Allergies Father     Circulatory Father     Depression Father     Respiratory Father     Asthma Father     Alcohol/Drug Paternal Grandfather     Cerebrovascular Disease Paternal Grandfather     Depression Son     Psychotic Disorder Son         anxiety disorder    Depression Daughter     Cerebrovascular Disease Maternal Grandfather     Cerebrovascular Disease Paternal Grandmother     Diabetes Brother     Allergies Sister     Depression Sister     Gynecology Sister         Medications:     Current Outpatient Medications   Medication Sig    acetaminophen (TYLENOL) 500 MG tablet Take 1,000 mg by mouth every 8 hours as needed    ammonium lactate (AMLACTIN) 12 % external cream Apply topically daily To feet and legs daily & as needed    aspirin (ASPIRIN LOW DOSE) 81 MG tablet Take 1 tablet (81 mg) by mouth daily    atorvastatin (LIPITOR) 20 MG tablet Take 1.5 tablets (30 mg) by mouth daily    Bioflavonoid Products (CANDIDO-C) TABS Take 1,000 mg by mouth daily    blood glucose (NO BRAND SPECIFIED) lancets standard Use to test blood sugar 1 time daily or as directed.    blood glucose (NO BRAND SPECIFIED) test strip Use to test blood sugar 1 time daily or as directed.    buPROPion (WELLBUTRIN XL) 150 MG 24 hr tablet Take 1 tablet (150 mg) by mouth every morning    calcium carbonate (OS-LUIS) 1500 (600 Ca) MG tablet Take 600 mg by mouth daily    cariprazine (VRAYLAR) 4.5 MG capsule Take 1 capsule (4.5 mg) by mouth  daily    cyclobenzaprine (FLEXERIL) 10 MG tablet Take 1 tablet (10 mg) by mouth 3 times daily as needed for muscle spasms    DULoxetine (CYMBALTA) 30 MG capsule Take 1 capsule (30 mg) by mouth 2 times daily    Folic Acid-Vit B6-Vit B12 0.5-5-0.2 MG TABS Take 1 tablet by mouth daily    glucose 40 % (400 mg/mL) gel Take 15-30 g by mouth every 15 minutes as needed for low blood sugar (Patient taking differently: Take by mouth every 15 minutes as needed for low blood sugar)    hydrochlorothiazide (HYDRODIURIL) 25 MG tablet Take 1.5 tablets (37.5 mg) by mouth daily    insulin glargine (LANTUS PEN) 100 UNIT/ML pen Inject 40 Units Subcutaneous At Bedtime    loperamide (IMODIUM) 2 MG capsule Take 1 capsule (2 mg) by mouth 4 times daily as needed for diarrhea    losartan (COZAAR) 100 MG tablet Take 1 tablet (100 mg) by mouth daily    medication given by implanted intrathecal pump continuous Drug # 1: Fentanyl (Sublimaze) - Conc: 2000 mcg/mL - Total Dose / 24 hours: 1663.3 mcg  Drug # 2: Bupivacaine (Marcaine)  - Conc: 20 mg/mL - Total Dose / 24 hours: 16.633 mg  Drug # 3: Morphine (Duramorph or Infumorph)  - Conc: 9 mg/mL - Total Dose / 24 hours: 7.484 mg    Rate: 0.0325 mL/hr  Pump Reservoir Volume: 40 mL  Pump Reservoir Alarm Volume: 2 ml  Outside Clinic & Provider: Dr. Pawan Shaw, [facility]  Last Refill Date: 5/18/2023  Next Refill Date: 6/25/2023  [place] Alarm Date:  Pump : Medtronic SynchroMed II    Boluses: Up to 3 per day, 3 minute duration. Lock-out every 6 hours  Fentanyl: 99.9 mcg/dose  Bupivacaine: 0.999 mg/dose  Morphine: 0.4497 mg/dose    metFORMIN (GLUCOPHAGE XR) 500 MG 24 hr tablet Take 2 tablets (1,000 mg) by mouth 2 times daily (with meals)    metoprolol succinate ER (TOPROL XL) 100 MG 24 hr tablet Take 1 tablet (100 mg) by mouth daily    metroNIDAZOLE (FLAGYL) 500 MG tablet Crush and sprinkle light layer over right leg, left leg and right toe wounds daily    modafinil (PROVIGIL)  "200 MG tablet Take 1.5 tablets (300 mg) by mouth daily    multivitamin w/minerals (THERA-VIT-M) tablet Take 1 tablet by mouth daily Men's 50+    nitroGLYcerin (NITROSTAT) 0.4 MG sublingual tablet Place 1 tablet (0.4 mg) under the tongue every 5 minutes as needed for chest pain For chest pain place 1 tablet under the tongue every 5 minutes for 3 doses. If symptoms persist 5 minutes after 1st dose call 911.    OVER-THE-COUNTER Take 3 capsules by mouth daily Supplement for brain health, unknown brand/ingredients    oxyCODONE-acetaminophen (PERCOCET) 5-325 MG tablet Take 1 tablet by mouth 2 times daily    POTASSIUM PO Take 1 tablet by mouth daily Unspecified tablet strength; patient estimated \"600 mg, +/- 200 mg\", takes for leg cramps    pregabalin (LYRICA) 100 MG capsule Take 1 capsule (100 mg) by mouth daily as needed (In addition to 100mg 3 times per day scheduled)    pregabalin (LYRICA) 100 MG capsule Take 1 capsule (100 mg) by mouth 3 times daily    senna-docusate (SENOKOT-S/PERICOLACE) 8.6-50 MG tablet Take 1-2 tablets by mouth 2 times daily At baseline, Take 1 tablet twice daily. May take second tablet if needed.    SUMAtriptan (IMITREX) 50 MG tablet Take 1 tablet (50 mg) by mouth at onset of headache for migraine May repeat in 2 hours. Max 4 tablets/24 hours.    tamsulosin (FLOMAX) 0.4 MG capsule Take 1 capsule (0.4 mg) by mouth 2 times daily    testosterone (ANDROGEL/TESTIM) 50 MG/5GM (1%) topical gel Place 1 packet (50 mg of testosterone) onto the skin daily    vitamin B-Complex Take 3 tablets by mouth daily    vitamin D3 (CHOLECALCIFEROL) 50 mcg (2000 units) tablet Take 1 tablet (50 mcg) by mouth daily     No current facility-administered medications for this visit.        Allergies:     Allergies   Allergen Reactions    Cephalexin Diarrhea    Liraglutide Other (See Comments), Headache and Unknown     Other reaction(s): Headache, Unknown  PN: migraines - Increased migraine frequency and severity      Sulfa " Antibiotics Angioedema and Swelling     Pt has taken  Taken sulfa drugs orally without trouble. He had problems with Sulfa eye drops. Eye swelled up          Review of Systems:   As noted above     Physical Exam:   Vitals: /77   Pulse 89   SpO2 95%      Neuro:   General Appearance: No apparent distress, well-nourished, well-groomed, pleasant     Mental Status: Alert and oriented to person, place, and time. Speech fluent and comprehension intact. No dysarthria. 29/30 to testing 4/5 delayed recall, got 5th with cues easily    Cranial Nerves:   II: Visual fields: normal  III: Pupils: 3 mm, equal, round, reactive to light   III,IV,VI: Extraocular Movements: intact   V: Facial sensation: intact to light touch  VII: Facial strength: intact without asymmetry  VIII: Hearing: intact grossly  IX: Palate: intact   XII: Tongue movement: normal     Motor Exam:   5/5 diffusely with exception of L shoulder issues and L biceps tendon issues (known) and some weakness therein  No abnormal movements. Tone is normal throughout.    Sensory: intact to light touch    Coordination: no dysmetria noted    Reflexes: biceps, triceps, brachioradialis, patellar 1+and ankle jerks trace and symmetric.          Data: Pertinent prior to visit   Imaging:  EXAM: MR BRAIN W/O and W CONTRAST  LOCATION: [facility]  DATE/TIME: 3/27/2023 1:09 PM     INDICATION:  Memory difficulty, Abnormal MRI of head  COMPARISON: 11/16/2020.  CONTRAST: 10mL gadavist  TECHNIQUE: MRI brain without and with contrast special attention to the sella region.     FINDINGS: On the diffusion-weighted images there is no evidence of acute ischemia or restricted diffusion. There is no discrete mass lesion or midline shift. There is no acute extra-axial fluid collection or acute intraparenchymal hemorrhage. There are   appropriate flow voids within the cavernous portions of the internal carotid arteries and the basilar artery. On the FLAIR and  T2-weighted images there are a few tiny foci of high signal within the periventricular and subcortical white matter consistent   with minimal small vessel ischemic disease. The ventricular system, basal cisterns and sulci are consistent with diffuse volume loss.     On the dedicated images through the sella region again visualized is the mildly enlarged solid enhancing pituitary gland. This measures approximately 1.4 cm anteriorly posteriorly by 1.0 cm craniocaudally which is stable in the interval. There is normal   enhancement and thickness of the pituitary stalk. There is no evidence of compression upon the optic chiasm.     There is no evidence of cerebellar tonsillar ectopia. Corpus callosum is within normal limits for age. The orbit regions are unremarkable. There is no significant paranasal sinus disease. The mastoid air cells and the middle ear regions are clear.                                                                      IMPRESSION:  1. Stable mild enlargement pituitary gland with homogeneous enhancement.  2. No new mass lesion, hemorrhage or focal area suggestive of acute ischemia.  3. Mild age-related changes.        I personally reviewed the above images and reports.  The imaging represents to me unrevealing MRI brain with known pitutiary enlargement that should be monitored over time      Procedures:      Laboratory:  Lab Results   Component Value Date    A1C 8.8 05/12/2023    A1C 9.3 02/23/2023    A1C 5.8 05/07/2021    A1C 5.6 11/19/2020    A1C 5.5 06/10/2020    A1C 5.5 04/16/2020    A1C 7.4 01/09/2020       TSH   Date Value Ref Range Status   12/05/2020 3.04 0.40 - 4.00 mU/L Final     B12 WNL 2019     Myasthenia antibodies previously negative        The total time of this encounter today amounted to 50 minutes. This time included time spent with the patient, prep work, ordering tests, and performing post visit documentation.

## 2023-09-15 ENCOUNTER — TELEPHONE (OUTPATIENT)
Dept: FAMILY MEDICINE | Facility: CLINIC | Age: 68
End: 2023-09-15

## 2023-09-15 ENCOUNTER — OFFICE VISIT (OUTPATIENT)
Dept: NEUROLOGY | Facility: CLINIC | Age: 68
End: 2023-09-15
Attending: FAMILY MEDICINE
Payer: COMMERCIAL

## 2023-09-15 VITALS — SYSTOLIC BLOOD PRESSURE: 118 MMHG | DIASTOLIC BLOOD PRESSURE: 77 MMHG | HEART RATE: 89 BPM | OXYGEN SATURATION: 95 %

## 2023-09-15 DIAGNOSIS — G47.33 OBSTRUCTIVE SLEEP APNEA SYNDROME: ICD-10-CM

## 2023-09-15 DIAGNOSIS — R41.3 MEMORY DIFFICULTY: ICD-10-CM

## 2023-09-15 DIAGNOSIS — E11.9 DIABETES MELLITUS WITHOUT COMPLICATION (H): Primary | ICD-10-CM

## 2023-09-15 PROCEDURE — 99215 OFFICE O/P EST HI 40 MIN: CPT | Performed by: STUDENT IN AN ORGANIZED HEALTH CARE EDUCATION/TRAINING PROGRAM

## 2023-09-15 NOTE — LETTER
9/15/2023         RE: Parmjit Hernández  [address]  [facility] [address]        Dear Colleague,    Thank you for referring your patient, Parmjit Hernández, to the [facility] NEUROLOGY [facility]ICS [facility]. Please see a copy of my visit note below.    [facility]  Section of General Neurology  New Patient Visit      Parmjit Hernández MRN# [id number]   Age: 68 year old YOB: 1955              Assessment and Plan:   Assessment:  Erickson Hernández is a pleasant 68 year old man who presents in consultation for memory changes.  He feels his memory has improved since he booked the visit which is great news. We reviewed his MRI which shows age appropriate changes, no disproportionate hippocampal atrophy.  He scored quite well on testing today.  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  In totality I do not see evidence of a dementia syndrome.  Improved sleep could improve memory further which we discussed (He has not been using CPAP since a move from [place] e.g. (lost in the move).       Plan:  --No evidence of a dementia syndrome as above at this time.    --Pituitary enlargement seen going back to ~2020 scan.  Should continue to monitor over time perhaps in ~ year from last scan and space out farther from there pending stability.    --Recommend yearly eye exams in this regard, neurosurgical referral with any growth  --Follow up with general neurology can be PRN for now          Christopher Jose MD   of Neurology   [facility]      History of Presenting Symptoms:   Parmjit Hernández is a 68 year old male who presents today for evaluation of memory changes.   He has a PMH of T2DM, migraine headaches, abdominal pain, RLS, amyloidosis, bipolar disorder, OCD    Memory is getting better  For a while short term memory was worsening but is better.    He used to have chronic migraines but these are  "improved    --Sleep:/NORMA --CPAP--\"horseshit\" sleep, due to pain  Doesn't have CPAP from move from [place], needs to get this.    --Mood: doing OK  --ADLs: pay bills OK   --Drinking/drug use: not currently  --Family history: dad had brain bleed  --Driving--going OK.      He lives in [place]  Works as an  trying to unite companies with [employer]   Is permanently disabled he notes from back, amyloid, chronic pain is disabled.      It appears he previously followed with neurology for chronic fatigue, diffuse weakness though I cannot see this note fully.        Recent PCP note reviewed (Dr. Radha Mcdermott)   Chronic fatigue  His chronic issues with fatigue that he has had over the past several years is likely multifactorial.  He has several chronic medical conditions that are likely contributing to this.  I expressed my concern that his oxygen saturation is pretty low today.  We had a monitor in the room and it was running in the upper 80s-mid 90s.  He did not appear acutely short of breath and his lungs sounded clear.  I recommended checking a chest x-ray in the office today.  I personally reviewed the images with him and provided an initial interpretation.  There is some evidence of cardiomegaly which appears similar to previous x-rays.  There is no acute infiltrates or effusions present.  The formal radiology report is pending.  I recommended that he keep monitoring his oxygen saturation at home with his oximeter.  I also recommended that he start taking the Lantus insulin regularly.  We decided to increase the dose to 30 units daily and I suggested he take this once daily instead of splitting up the dose since he is having difficulties with remembering to take this regularly.  He will be due to have his A1c rechecked in a couple of weeks.  He will continue to follow closely with his pain specialist.  He appears to be a little overmedicated today which may be contributing to the low oxygen " saturation numbers we are seeing.  He reports that he is taking his pain medications as prescribed.     Hypoxia  - XR Chest 2 Views; Future  - BNP-N terminal pro; Future  - Basic metabolic panel  (Ca, Cl, CO2, Creat, Gluc, K, Na, BUN); Future  - CBC with platelets; Future  - BNP-N terminal pro  - Basic metabolic panel  (Ca, Cl, CO2, Creat, Gluc, K, Na, BUN)  - CBC with platelets     Type 2 diabetes mellitus without complication, unspecified whether long term insulin use (H)  - insulin glargine (LANTUS PEN) 100 UNIT/ML pen; Inject 30 Units Subcutaneous At Bedtime     Morbid obesity (H)     Spinal stenosis at L4-L5 level     Chronic pain syndrome     Post-traumatic osteoarthritis of left shoulder     Dyspnea, unspecified type  - BNP-N terminal pro; Future  - BNP-N terminal pro       Past Medical History:     Patient Active Problem List   Diagnosis     Low back pain     Hyperlipidemia     Hypertension goal BP (blood pressure) < 140/90     Type 2 diabetes mellitus (H)     Advanced directives, counseling/discussion     Migraine headache     History of diverticulitis     MENTAL HEALTH     Generalized abdominal pain     Light chain (AL) amyloidosis (H)     Insomnia due to medical condition     Obstructive sleep apnea syndrome     Restless legs syndrome (RLS)     Leg edema     Morbid obesity (H)     Venous stasis dermatitis of both lower extremities     Polyneuropathy, unspecified     Acquired lymphedema     Low blood pressure reading     History of peripheral stem cell transplant (H)     Other specified anxiety disorders     Abnormal electrocardiography     Bilateral leg edema     Onychomycosis     Thrombocytopenia, unspecified (H)     Bilateral leg weakness     NSTEMI (non-ST elevated myocardial infarction) (H)     Essential hypertension     Peripheral vascular disease (H)     Other cirrhosis of liver (H)     Lymphedema     Cellulitis of right lower extremity     Right upper quadrant pain     Abnormal computed tomography  scan     Acquired pancytopenia (H)     Benign neoplasm of cecum     Benign neoplasm of transverse colon     Benign prostatic hyperplasia     Bilateral cataracts     Bipolar 1 disorder, manic, mild (H)     Cannabis dependence in remission (H)     Cardiovascular stress test abnormal     Chronic fatigue, unspecified     Continuous opioid dependence (H)     Contusion of rib     Coronary arteriosclerosis     DDD (degenerative disc disease), lumbar     Decreased testosterone level     Depressed bipolar I disorder in full remission (H)     Chronic diarrhea     Hernia of anterior abdominal wall     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)     Guillain-[place]ue[place] syndrome (H)     History of anaphylaxis due to severe acute respiratory syndrome coronavirus 2 (SARS-CoV-2) vaccine     History of colonic polyps     Hypertensive renal disease     Hypocalcemia     Immunoglobulin deficiency (H)     Localized enlarged lymph nodes     Nonalcoholic steatohepatitis     Noninfectious gastroenteritis     Other muscle spasm     Polyp of colon     Presence of implanted infusion pump     Retention of urine     Spinal stenosis at L4-L5 level     Chronic pain     Cerebral infarction, unspecified (H)     Abdominal pain, generalized     Nausea and vomiting, unspecified vomiting type     Past Medical History:   Diagnosis Date     Acquired lymphedema 2/4/2019     Allergic state      Amyloidosis (H)     followed by hematology Dr. Romeo status post peripheral stem cell transplant     Anxiety 5/11/2016     Anxiety and depression 12/18/2013     Bipolar I disorder (H) 9/1/2015    Under care of psychiatrist NEISHA- Dr Rahman doxepin 25 mg, 2 cap bedtime DULoxetine 20 mg once daily  OLANZapine 7.5 mg  1 tab bedtime      DDD (degenerative disc disease), lumbar 8/6/2020     Depressive disorder 1995     EKG abnormality 4/20/2020     H/O bone marrow transplant (H)      Headache(784.0)      History of diverticulitis 1/27/2015    1/15-rxed  with antibiotics      Hyperlipidemia LDL goal <100 10/31/2010     Hypertension 2007     Hypertension goal BP (blood pressure) < 140/90 10/11/2011     Marianne (H) 8/20/2015     Morbid obesity (H) 8/28/2018     Myalgia and myositis, unspecified      Narcotic dependence (H) 9/6/2016    None in about 6 months- 8/1/17     NSTEMI (non-ST elevated myocardial infarction) (H) 04/19/2020     OCD (obsessive compulsive disorder)      Other acquired absence of organ 94     Other cirrhosis of liver (H) possibly from vences  4/15/2020     Other specified viral warts      Peripheral edema 5/20/2018    Noncardiac Continue with  furosemide (LASIX) 20 MG tablet,  potassium       chloride SA (K-DUR/KLOR-CON M) 10 MEQ CR  Tab once daily  Basic metabolic panel     Polyneuropathy associated with underlying disease (H) 2/4/2019     Restless legs syndrome (RLS) 6/29/2017     Stasis dermatitis of both legs 12/31/2018     Type 2 diabetes mellitus with other specified complication (H) 7/10/2017        Past Surgical History:     Past Surgical History:   Procedure Laterality Date     ABDOMEN SURGERY  2007    abdominal hernia     BACK SURGERY  8/6/2020     BIOPSY  june 2015    negative     BMT PROTOCOL      for systemic amyloidosis     BONE MARROW BIOPSY, BONE SPECIMEN, NEEDLE/TROCAR N/A 6/8/2016    Procedure: BIOPSY BONE MARROW;  Surgeon: Nathan Agrawal MD;  Location:  GI     BONE MARROW BIOPSY, BONE SPECIMEN, NEEDLE/TROCAR N/A 2/20/2019    Procedure: BIOPSY BONE MARROW;  Surgeon: Michael Raygoza MD;  Location:  GI     CHOLECYSTECTOMY  1995    lap qian     COLONOSCOPY  2012    hx polyps     ESOPHAGOSCOPY, GASTROSCOPY, DUODENOSCOPY (EGD), COMBINED N/A 5/29/2015    Procedure: COMBINED ESOPHAGOSCOPY, GASTROSCOPY, DUODENOSCOPY (EGD), BIOPSY SINGLE OR MULTIPLE;  Surgeon: John Jacob MD;  Location:  GI     HERNIA REPAIR, UMBILICAL  2006     ZZ NONSPECIFIC PROCEDURE  94    Cholecystectomy     [facility] NONSPECIFIC PROCEDURE   2000    repair deviated septum        Social History:     Social History     Tobacco Use     Smoking status: Never     Smokeless tobacco: Never   Vaping Use     Vaping Use: Never used   Substance Use Topics     Alcohol use: Not Currently     Alcohol/week: 0.0 standard drinks of alcohol     Drug use: Not Currently     Types: Cocaine, Marijuana, Methamphetamines, Opiates, IV, Amphetamines, Barbiturates, Benzodiazepines, Codeine, Fentanyl, Hashish, Hydrocodone, Hydromorphone, LSD, Oxycodone        Family History:     Family History   Problem Relation Age of Onset     Cerebrovascular Disease Mother      C.A.D. Mother      Hypertension Mother      Alcohol/Drug Mother      Arthritis Mother      Cerebrovascular Disease Maternal Grandmother      C.A.D. Maternal Grandmother      Alcohol/Drug Maternal Grandmother      C.A.D. Father      Heart Disease Father      Hypertension Father      Alcohol/Drug Father      Allergies Father      Circulatory Father      Depression Father      Respiratory Father      Asthma Father      Alcohol/Drug Paternal Grandfather      Cerebrovascular Disease Paternal Grandfather      Depression Son      Psychotic Disorder Son         anxiety disorder     Depression Daughter      Cerebrovascular Disease Maternal Grandfather      Cerebrovascular Disease Paternal Grandmother      Diabetes Brother      Allergies Sister      Depression Sister      Gynecology Sister         Medications:     Current Outpatient Medications   Medication Sig     acetaminophen (TYLENOL) 500 MG tablet Take 1,000 mg by mouth every 8 hours as needed     ammonium lactate (AMLACTIN) 12 % external cream Apply topically daily To feet and legs daily & as needed     aspirin (ASPIRIN LOW DOSE) 81 MG tablet Take 1 tablet (81 mg) by mouth daily     atorvastatin (LIPITOR) 20 MG tablet Take 1.5 tablets (30 mg) by mouth daily     Bioflavonoid Products (CANDIDO-C) TABS Take 1,000 mg by mouth daily     blood glucose (NO BRAND SPECIFIED) lancets  standard Use to test blood sugar 1 time daily or as directed.     blood glucose (NO BRAND SPECIFIED) test strip Use to test blood sugar 1 time daily or as directed.     buPROPion (WELLBUTRIN XL) 150 MG 24 hr tablet Take 1 tablet (150 mg) by mouth every morning     calcium carbonate (OS-LUIS) 1500 (600 Ca) MG tablet Take 600 mg by mouth daily     cariprazine (VRAYLAR) 4.5 MG capsule Take 1 capsule (4.5 mg) by mouth daily     cyclobenzaprine (FLEXERIL) 10 MG tablet Take 1 tablet (10 mg) by mouth 3 times daily as needed for muscle spasms     DULoxetine (CYMBALTA) 30 MG capsule Take 1 capsule (30 mg) by mouth 2 times daily     Folic Acid-Vit B6-Vit B12 0.5-5-0.2 MG TABS Take 1 tablet by mouth daily     glucose 40 % (400 mg/mL) gel Take 15-30 g by mouth every 15 minutes as needed for low blood sugar (Patient taking differently: Take by mouth every 15 minutes as needed for low blood sugar)     hydrochlorothiazide (HYDRODIURIL) 25 MG tablet Take 1.5 tablets (37.5 mg) by mouth daily     insulin glargine (LANTUS PEN) 100 UNIT/ML pen Inject 40 Units Subcutaneous At Bedtime     loperamide (IMODIUM) 2 MG capsule Take 1 capsule (2 mg) by mouth 4 times daily as needed for diarrhea     losartan (COZAAR) 100 MG tablet Take 1 tablet (100 mg) by mouth daily     medication given by implanted intrathecal pump continuous Drug # 1: Fentanyl (Sublimaze) - Conc: 2000 mcg/mL - Total Dose / 24 hours: 1663.3 mcg  Drug # 2: Bupivacaine (Marcaine)  - Conc: 20 mg/mL - Total Dose / 24 hours: 16.633 mg  Drug # 3: Morphine (Duramorph or Infumorph)  - Conc: 9 mg/mL - Total Dose / 24 hours: 7.484 mg    Rate: 0.0325 mL/hr  Pump Reservoir Volume: 40 mL  Pump Reservoir Alarm Volume: 2 ml  Outside Clinic & Provider: [facility]  Last Refill Date: 5/18/2023  Next Refill Date: 6/25/2023  Low [place] Alarm Date:  Pump : Medtronic SynchroMed II    Boluses: Up to 3 per day, 3 minute duration. Lock-out every 6  "hours  Fentanyl: 99.9 mcg/dose  Bupivacaine: 0.999 mg/dose  Morphine: 0.4497 mg/dose     metFORMIN (GLUCOPHAGE XR) 500 MG 24 hr tablet Take 2 tablets (1,000 mg) by mouth 2 times daily (with meals)     metoprolol succinate ER (TOPROL XL) 100 MG 24 hr tablet Take 1 tablet (100 mg) by mouth daily     metroNIDAZOLE (FLAGYL) 500 MG tablet Crush and sprinkle light layer over right leg, left leg and right toe wounds daily     modafinil (PROVIGIL) 200 MG tablet Take 1.5 tablets (300 mg) by mouth daily     multivitamin w/minerals (THERA-VIT-M) tablet Take 1 tablet by mouth daily Men's 50+     nitroGLYcerin (NITROSTAT) 0.4 MG sublingual tablet Place 1 tablet (0.4 mg) under the tongue every 5 minutes as needed for chest pain For chest pain place 1 tablet under the tongue every 5 minutes for 3 doses. If symptoms persist 5 minutes after 1st dose call 911.     OVER-THE-COUNTER Take 3 capsules by mouth daily Supplement for brain health, unknown brand/ingredients     oxyCODONE-acetaminophen (PERCOCET) 5-325 MG tablet Take 1 tablet by mouth 2 times daily     POTASSIUM PO Take 1 tablet by mouth daily Unspecified tablet strength; patient estimated \"600 mg, +/- 200 mg\", takes for leg cramps     pregabalin (LYRICA) 100 MG capsule Take 1 capsule (100 mg) by mouth daily as needed (In addition to 100mg 3 times per day scheduled)     pregabalin (LYRICA) 100 MG capsule Take 1 capsule (100 mg) by mouth 3 times daily     senna-docusate (SENOKOT-S/PERICOLACE) 8.6-50 MG tablet Take 1-2 tablets by mouth 2 times daily At baseline, Take 1 tablet twice daily. May take second tablet if needed.     SUMAtriptan (IMITREX) 50 MG tablet Take 1 tablet (50 mg) by mouth at onset of headache for migraine May repeat in 2 hours. Max 4 tablets/24 hours.     tamsulosin (FLOMAX) 0.4 MG capsule Take 1 capsule (0.4 mg) by mouth 2 times daily     testosterone (ANDROGEL/TESTIM) 50 MG/5GM (1%) topical gel Place 1 packet (50 mg of testosterone) onto the skin daily     " vitamin B-Complex Take 3 tablets by mouth daily     vitamin D3 (CHOLECALCIFEROL) 50 mcg (2000 units) tablet Take 1 tablet (50 mcg) by mouth daily     No current facility-administered medications for this visit.        Allergies:     Allergies   Allergen Reactions     Cephalexin Diarrhea     Liraglutide Other (See Comments), Headache and Unknown     Other reaction(s): Headache, Unknown  PN: migraines - Increased migraine frequency and severity       Sulfa Antibiotics Angioedema and Swelling     Pt has taken  Taken sulfa drugs orally without trouble. He had problems with Sulfa eye drops. Eye swelled up          Review of Systems:   As noted above     Physical Exam:   Vitals: /77   Pulse 89   SpO2 95%      Neuro:   General Appearance: No apparent distress, well-nourished, well-groomed, pleasant     Mental Status: Alert and oriented to person, place, and time. Speech fluent and comprehension intact. No dysarthria. 29/30 to testing 4/5 delayed recall, got 5th with cues easily    Cranial Nerves:   II: Visual fields: normal  III: Pupils: 3 mm, equal, round, reactive to light   III,IV,VI: Extraocular Movements: intact   V: Facial sensation: intact to light touch  VII: Facial strength: intact without asymmetry  VIII: Hearing: intact grossly  IX: Palate: intact   XII: Tongue movement: normal     Motor Exam:   5/5 diffusely with exception of L shoulder issues and L biceps tendon issues (known) and some weakness therein  No abnormal movements. Tone is normal throughout.    Sensory: intact to light touch    Coordination: no dysmetria noted    Reflexes: biceps, triceps, brachioradialis, patellar 1+and ankle jerks trace and symmetric.          Data: Pertinent prior to visit   Imaging:  EXAM: MR BRAIN W/O and W CONTRAST  LOCATION: [facility]  DATE/TIME: 3/27/2023 1:09 PM     INDICATION:  Memory difficulty, Abnormal MRI of head  COMPARISON: 11/16/2020.  CONTRAST: 10mL gadavist  TECHNIQUE: MRI brain  without and with contrast special attention to the sella region.     FINDINGS: On the diffusion-weighted images there is no evidence of acute ischemia or restricted diffusion. There is no discrete mass lesion or midline shift. There is no acute extra-axial fluid collection or acute intraparenchymal hemorrhage. There are   appropriate flow voids within the cavernous portions of the internal carotid arteries and the basilar artery. On the FLAIR and T2-weighted images there are a few tiny foci of high signal within the periventricular and subcortical white matter consistent   with minimal small vessel ischemic disease. The ventricular system, basal cisterns and sulci are consistent with diffuse volume loss.     On the dedicated images through the sella region again visualized is the mildly enlarged solid enhancing pituitary gland. This measures approximately 1.4 cm anteriorly posteriorly by 1.0 cm craniocaudally which is stable in the interval. There is normal   enhancement and thickness of the pituitary stalk. There is no evidence of compression upon the optic chiasm.     There is no evidence of cerebellar tonsillar ectopia. Corpus callosum is within normal limits for age. The orbit regions are unremarkable. There is no significant paranasal sinus disease. The mastoid air cells and the middle ear regions are clear.                                                                      IMPRESSION:  1. Stable mild enlargement pituitary gland with homogeneous enhancement.  2. No new mass lesion, hemorrhage or focal area suggestive of acute ischemia.  3. Mild age-related changes.        I personally reviewed the above images and reports.  The imaging represents to me unrevealing MRI brain with known pitutiary enlargement that should be monitored over time      Procedures:      Laboratory:  Lab Results   Component Value Date    A1C 8.8 05/12/2023    A1C 9.3 02/23/2023    A1C 5.8 05/07/2021    A1C 5.6 11/19/2020    A1C 5.5  06/10/2020    A1C 5.5 04/16/2020    A1C 7.4 01/09/2020       TSH   Date Value Ref Range Status   12/05/2020 3.04 0.40 - 4.00 mU/L Final     B12 WNL 2019     Myasthenia antibodies previously negative        The total time of this encounter today amounted to 50 minutes. This time included time spent with the patient, prep work, ordering tests, and performing post visit documentation.      Again, thank you for allowing me to participate in the care of your patient.        Sincerely,        Dylan Jose MD

## 2023-09-15 NOTE — NURSING NOTE
"Parmjit Hernández is a 68 year old male who presents for:  Chief Complaint   Patient presents with    Referral     Memory difficulty        Initial Vitals:  /77   Pulse 89   SpO2 95%  Estimated body mass index is 41.48 kg/m  as calculated from the following:    Height as of 8/31/23: 1.676 m (5' 6\").    Weight as of 8/31/23: 116.6 kg (257 lb).. There is no height or weight on file to calculate BSA. BP completed using cuff size: warren Mccray    "

## 2023-09-15 NOTE — PATIENT INSTRUCTIONS
I would repeat brain MRI in ~1 year from last scan for non specific pituitary finding  Where your CPAP, prioritize sleep  Nothing suspicious for dementia on scan or testing.

## 2023-09-18 ENCOUNTER — LAB (OUTPATIENT)
Dept: LAB | Facility: CLINIC | Age: 68
End: 2023-09-18
Payer: COMMERCIAL

## 2023-09-18 ENCOUNTER — VIRTUAL VISIT (OUTPATIENT)
Dept: FAMILY MEDICINE | Facility: CLINIC | Age: 68
End: 2023-09-18
Payer: COMMERCIAL

## 2023-09-18 DIAGNOSIS — R07.0 THROAT PAIN: ICD-10-CM

## 2023-09-18 DIAGNOSIS — B34.9 VIRAL SYNDROME: ICD-10-CM

## 2023-09-18 DIAGNOSIS — B34.9 VIRAL SYNDROME: Primary | ICD-10-CM

## 2023-09-18 LAB
ALBUMIN SERPL BCG-MCNC: 4.3 G/DL (ref 3.5–5.2)
ALP SERPL-CCNC: 108 U/L (ref 40–129)
ALT SERPL W P-5'-P-CCNC: 54 U/L (ref 0–70)
ANION GAP SERPL CALCULATED.3IONS-SCNC: 10 MMOL/L (ref 7–15)
AST SERPL W P-5'-P-CCNC: 34 U/L (ref 0–45)
BASOPHILS # BLD AUTO: 0 10E3/UL (ref 0–0.2)
BASOPHILS NFR BLD AUTO: 1 %
BILIRUB SERPL-MCNC: 0.3 MG/DL
BUN SERPL-MCNC: 17.6 MG/DL (ref 8–23)
CALCIUM SERPL-MCNC: 9.3 MG/DL (ref 8.8–10.2)
CHLORIDE SERPL-SCNC: 98 MMOL/L (ref 98–107)
CREAT SERPL-MCNC: 0.84 MG/DL (ref 0.67–1.17)
DEPRECATED HCO3 PLAS-SCNC: 27 MMOL/L (ref 22–29)
DEPRECATED S PYO AG THROAT QL EIA: NEGATIVE
EGFRCR SERPLBLD CKD-EPI 2021: >90 ML/MIN/1.73M2
EOSINOPHIL # BLD AUTO: 0.2 10E3/UL (ref 0–0.7)
EOSINOPHIL NFR BLD AUTO: 3 %
ERYTHROCYTE [DISTWIDTH] IN BLOOD BY AUTOMATED COUNT: 13.5 % (ref 10–15)
GLUCOSE SERPL-MCNC: 321 MG/DL (ref 70–99)
GROUP A STREP BY PCR: NOT DETECTED
HBA1C MFR BLD: 9.8 % (ref 0–5.6)
HCT VFR BLD AUTO: 39.2 % (ref 40–53)
HGB BLD-MCNC: 13.9 G/DL (ref 13.3–17.7)
IMM GRANULOCYTES # BLD: 0.1 10E3/UL
IMM GRANULOCYTES NFR BLD: 1 %
LYMPHOCYTES # BLD AUTO: 0.7 10E3/UL (ref 0.8–5.3)
LYMPHOCYTES NFR BLD AUTO: 14 %
MCH RBC QN AUTO: 33.3 PG (ref 26.5–33)
MCHC RBC AUTO-ENTMCNC: 35.5 G/DL (ref 31.5–36.5)
MCV RBC AUTO: 94 FL (ref 78–100)
MONOCYTES # BLD AUTO: 0.3 10E3/UL (ref 0–1.3)
MONOCYTES NFR BLD AUTO: 6 %
MONOCYTES NFR BLD AUTO: NEGATIVE %
NEUTROPHILS # BLD AUTO: 3.8 10E3/UL (ref 1.6–8.3)
NEUTROPHILS NFR BLD AUTO: 75 %
NRBC # BLD AUTO: 0 10E3/UL
NRBC BLD AUTO-RTO: 0 /100
PLATELET # BLD AUTO: 121 10E3/UL (ref 150–450)
POTASSIUM SERPL-SCNC: 4.1 MMOL/L (ref 3.4–5.3)
PROT SERPL-MCNC: 6.6 G/DL (ref 6.4–8.3)
RBC # BLD AUTO: 4.18 10E6/UL (ref 4.4–5.9)
SARS-COV-2 RNA RESP QL NAA+PROBE: NEGATIVE
SODIUM SERPL-SCNC: 135 MMOL/L (ref 136–145)
TSH SERPL DL<=0.005 MIU/L-ACNC: 2.55 UIU/ML (ref 0.3–4.2)
WBC # BLD AUTO: 5.1 10E3/UL (ref 4–11)

## 2023-09-18 PROCEDURE — 85025 COMPLETE CBC W/AUTO DIFF WBC: CPT

## 2023-09-18 PROCEDURE — 86665 EPSTEIN-BARR CAPSID VCA: CPT

## 2023-09-18 PROCEDURE — 87651 STREP A DNA AMP PROBE: CPT

## 2023-09-18 PROCEDURE — 86308 HETEROPHILE ANTIBODY SCREEN: CPT

## 2023-09-18 PROCEDURE — 80053 COMPREHEN METABOLIC PANEL: CPT

## 2023-09-18 PROCEDURE — 86665 EPSTEIN-BARR CAPSID VCA: CPT | Mod: 59

## 2023-09-18 PROCEDURE — 99442 PR PHYSICIAN TELEPHONE EVALUATION 11-20 MIN: CPT | Mod: 93 | Performed by: PHYSICIAN ASSISTANT

## 2023-09-18 PROCEDURE — 83036 HEMOGLOBIN GLYCOSYLATED A1C: CPT

## 2023-09-18 PROCEDURE — 87635 SARS-COV-2 COVID-19 AMP PRB: CPT

## 2023-09-18 PROCEDURE — 36415 COLL VENOUS BLD VENIPUNCTURE: CPT

## 2023-09-18 PROCEDURE — 84443 ASSAY THYROID STIM HORMONE: CPT

## 2023-09-18 RX ORDER — MODAFINIL 200 MG/1
TABLET ORAL
Qty: 45 TABLET | Refills: 0 | Status: SHIPPED | OUTPATIENT
Start: 2023-09-18 | End: 2023-10-02

## 2023-09-18 NOTE — PROGRESS NOTES
Erickson is a 68 year old who is being evaluated via a billable telephone visit.      What phone number would you like to be contacted at? See demo  How would you like to obtain your AVS? Emiliana    Distant Location (provider location):  Off-site    Assessment & Plan     Viral syndrome  Throat pain  Unclear etiology - lab only appointment to be done today; over the counter and supportive care discussed with patient at length as well. Return to clinic with any worsening or changes in symptoms and follow up with PCP for routine care.   - Comprehensive metabolic panel; Future  - CBC with Platelets & Differential; Future  - TSH with free T4 reflex; Future  - Streptococcus A Rapid Screen w/Reflex to PCR - Clinic Collect; Future  - Symptomatic COVID-19 Virus (Coronavirus) by PCR; Future  - Mononucleosis screen; Future  - EBV Capsid Antibody IgM; Future  - EBV Capsid Antibody IgG; Future  - amoxicillin-clavulanate (AUGMENTIN) 875-125 MG tablet; Take 1 tablet by mouth 2 times daily for 10 days    Review of prior external note(s) from - previous routine PCP notes  20 minutes spent by me on the date of the encounter doing chart review, history and exam, documentation and further activities per the note       There are no Patient Instructions on file for this visit.    ITZ Shaver [facility]   Erickson is a 68 year old, presenting for the following health issues:  Fatigue      HPI       Acute Illness  Acute illness concerns: a few days  Onset/Duration: throat pain with white spots and fatigue  Symptoms:  Fever: No  Chills/Sweats: No  Headache (location?): No  Sinus Pressure: No  Conjunctivitis:  No  Ear Pain: no  Rhinorrhea: No  Congestion: No  Sore Throat: YES  Cough: no  Wheeze: No  Decreased Appetite: No  Nausea: No  Vomiting: No  Diarrhea: No  Dysuria/Freq.: No  Dysuria or Hematuria: No  Fatigue/Achiness: YES  Sick/Strep Exposure: No  Therapies tried and outcome:  None        Review of Systems   Constitutional:  Positive for fatigue.      COVID tests have been negative at home.          Objective           Vitals:  No vitals were obtained today due to virtual visit.    Physical Exam   healthy, alert, and no distress  PSYCH: Alert and oriented times 3; coherent speech, normal   rate and volume, able to articulate logical thoughts, able   to abstract reason, no tangential thoughts, no hallucinations   or delusions  His affect is normal  RESP: No cough, no audible wheezing, able to talk in full sentences  Remainder of exam unable to be completed due to telephone visits    Patient will do lab only appointment today to check labs and other tests          Phone call duration: 11 minutes

## 2023-09-19 LAB
EBV VCA IGG SER IA-ACNC: <10 U/ML
EBV VCA IGG SER IA-ACNC: NORMAL

## 2023-09-19 NOTE — RESULT ENCOUNTER NOTE
"Renate Almendarez,  Your attached labs are negative for strep, mono and COVID, but your sodium is a bit low and your glucose/hgbA1C is higher than previously - this could also be associated with your symptoms.  Please make a follow up appointment with you PCP to discuss better diabetes control.    Please contact the office with any questions or concerns.    Tish Salas \"Kameron\" ITZ Russell    "

## 2023-09-20 LAB
EBV VCA IGM SER IA-ACNC: <10 U/ML
EBV VCA IGM SER IA-ACNC: NORMAL

## 2023-09-25 ENCOUNTER — DOCUMENTATION ONLY (OUTPATIENT)
Dept: FAMILY MEDICINE | Facility: CLINIC | Age: 68
End: 2023-09-25

## 2023-09-25 DIAGNOSIS — E11.9 TYPE 2 DIABETES MELLITUS WITHOUT COMPLICATION, UNSPECIFIED WHETHER LONG TERM INSULIN USE (H): Primary | ICD-10-CM

## 2023-09-25 NOTE — PROGRESS NOTES
Parmjit Hernández has an upcoming lab appointment:    Future Appointments   Date Time Provider Department Center   9/28/2023  8:30 AM LAB FIRST FLOOR [facility] [place]   10/4/2023  2:30 PM  BMT AUTO CELL THERAPY [facility]     Patient is scheduled for labs.    There is no order available. Please review and place either future orders or HMPO (Review of Health Maintenance Protocol Orders), as appropriate.    There are no preventive care reminders to display for this patient.    Martha ANDRES.

## 2023-09-26 ENCOUNTER — TELEPHONE (OUTPATIENT)
Dept: FAMILY MEDICINE | Facility: CLINIC | Age: 68
End: 2023-09-26
Payer: COMMERCIAL

## 2023-09-26 ENCOUNTER — HOSPITAL ENCOUNTER (OUTPATIENT)
Facility: CLINIC | Age: 68
Setting detail: OBSERVATION
Discharge: HOME | End: 2023-09-27
Attending: EMERGENCY MEDICINE | Admitting: EMERGENCY MEDICINE
Payer: COMMERCIAL

## 2023-09-26 ENCOUNTER — OFFICE VISIT (OUTPATIENT)
Dept: URGENT CARE | Facility: URGENT CARE | Age: 68
End: 2023-09-26
Payer: COMMERCIAL

## 2023-09-26 VITALS
HEART RATE: 90 BPM | OXYGEN SATURATION: 93 % | WEIGHT: 266 LBS | SYSTOLIC BLOOD PRESSURE: 109 MMHG | DIASTOLIC BLOOD PRESSURE: 70 MMHG | BODY MASS INDEX: 42.93 KG/M2 | TEMPERATURE: 98.4 F | RESPIRATION RATE: 18 BRPM

## 2023-09-26 DIAGNOSIS — J02.9 SORE THROAT: ICD-10-CM

## 2023-09-26 DIAGNOSIS — E11.9 TYPE 2 DIABETES MELLITUS WITHOUT COMPLICATION, UNSPECIFIED WHETHER LONG TERM INSULIN USE (H): ICD-10-CM

## 2023-09-26 DIAGNOSIS — L03.115 CELLULITIS OF RIGHT LOWER EXTREMITY: Primary | ICD-10-CM

## 2023-09-26 DIAGNOSIS — I89.0 LYMPHEDEMA: ICD-10-CM

## 2023-09-26 DIAGNOSIS — L03.115 BILATERAL LOWER LEG CELLULITIS: Primary | ICD-10-CM

## 2023-09-26 DIAGNOSIS — L03.116 BILATERAL LOWER LEG CELLULITIS: Primary | ICD-10-CM

## 2023-09-26 DIAGNOSIS — L03.119 CELLULITIS OF LOWER EXTREMITY, UNSPECIFIED LATERALITY: ICD-10-CM

## 2023-09-26 DIAGNOSIS — E11.69 TYPE 2 DIABETES MELLITUS WITH OTHER SPECIFIED COMPLICATION, WITHOUT LONG-TERM CURRENT USE OF INSULIN (H): ICD-10-CM

## 2023-09-26 LAB
ALBUMIN SERPL BCG-MCNC: 4.1 G/DL (ref 3.5–5.2)
ALBUMIN UR-MCNC: NEGATIVE MG/DL
ALP SERPL-CCNC: 97 U/L (ref 40–129)
ALT SERPL W P-5'-P-CCNC: 36 U/L (ref 0–70)
ANION GAP SERPL CALCULATED.3IONS-SCNC: 12 MMOL/L (ref 7–15)
APPEARANCE UR: CLEAR
AST SERPL W P-5'-P-CCNC: 24 U/L (ref 0–45)
BASOPHILS # BLD AUTO: 0.1 10E3/UL (ref 0–0.2)
BASOPHILS NFR BLD AUTO: 1 %
BILIRUB SERPL-MCNC: 0.4 MG/DL
BILIRUB UR QL STRIP: NEGATIVE
BUN SERPL-MCNC: 18 MG/DL (ref 8–23)
CALCIUM SERPL-MCNC: 9.5 MG/DL (ref 8.8–10.2)
CHLORIDE SERPL-SCNC: 95 MMOL/L (ref 98–107)
COLOR UR AUTO: NORMAL
CREAT SERPL-MCNC: 0.94 MG/DL (ref 0.67–1.17)
DEPRECATED HCO3 PLAS-SCNC: 26 MMOL/L (ref 22–29)
EGFRCR SERPLBLD CKD-EPI 2021: 89 ML/MIN/1.73M2
EOSINOPHIL # BLD AUTO: 0.2 10E3/UL (ref 0–0.7)
EOSINOPHIL NFR BLD AUTO: 4 %
ERYTHROCYTE [DISTWIDTH] IN BLOOD BY AUTOMATED COUNT: 13.5 % (ref 10–15)
FLUAV RNA SPEC QL NAA+PROBE: NEGATIVE
FLUBV RNA RESP QL NAA+PROBE: NEGATIVE
GLUCOSE SERPL-MCNC: 169 MG/DL (ref 70–99)
GLUCOSE UR STRIP-MCNC: NEGATIVE MG/DL
GROUP A STREP BY PCR: NOT DETECTED
HCO3 BLDV-SCNC: 32 MMOL/L (ref 21–28)
HCT VFR BLD AUTO: 38.3 % (ref 40–53)
HGB BLD-MCNC: 13.4 G/DL (ref 13.3–17.7)
HGB UR QL STRIP: NEGATIVE
HOLD SPECIMEN: NORMAL
HOLD SPECIMEN: NORMAL
IMM GRANULOCYTES # BLD: 0 10E3/UL
IMM GRANULOCYTES NFR BLD: 1 %
KETONES UR STRIP-MCNC: NEGATIVE MG/DL
LACTATE BLD-SCNC: 0.7 MMOL/L
LACTATE SERPL-SCNC: 1.1 MMOL/L (ref 0.7–2)
LEUKOCYTE ESTERASE UR QL STRIP: NEGATIVE
LYMPHOCYTES # BLD AUTO: 0.9 10E3/UL (ref 0.8–5.3)
LYMPHOCYTES NFR BLD AUTO: 16 %
MCH RBC QN AUTO: 33 PG (ref 26.5–33)
MCHC RBC AUTO-ENTMCNC: 35 G/DL (ref 31.5–36.5)
MCV RBC AUTO: 94 FL (ref 78–100)
MONOCYTES # BLD AUTO: 0.4 10E3/UL (ref 0–1.3)
MONOCYTES NFR BLD AUTO: 8 %
NEUTROPHILS # BLD AUTO: 3.9 10E3/UL (ref 1.6–8.3)
NEUTROPHILS NFR BLD AUTO: 70 %
NITRATE UR QL: NEGATIVE
NRBC # BLD AUTO: 0 10E3/UL
NRBC BLD AUTO-RTO: 0 /100
PCO2 BLDV: 48 MM HG (ref 40–50)
PH BLDV: 7.42 [PH] (ref 7.32–7.43)
PH UR STRIP: 5 [PH] (ref 5–7)
PLATELET # BLD AUTO: 167 10E3/UL (ref 150–450)
PO2 BLDV: 53 MM HG (ref 25–47)
POTASSIUM SERPL-SCNC: 4.2 MMOL/L (ref 3.4–5.3)
PROCALCITONIN SERPL IA-MCNC: 0.21 NG/ML
PROT SERPL-MCNC: 6.6 G/DL (ref 6.4–8.3)
RBC # BLD AUTO: 4.06 10E6/UL (ref 4.4–5.9)
RBC URINE: <1 /HPF
RSV RNA SPEC NAA+PROBE: NEGATIVE
SAO2 % BLDV: 87 % (ref 94–100)
SARS-COV-2 RNA RESP QL NAA+PROBE: NEGATIVE
SODIUM SERPL-SCNC: 133 MMOL/L (ref 135–145)
SP GR UR STRIP: 1.01 (ref 1–1.03)
SQUAMOUS EPITHELIAL: <1 /HPF
UROBILINOGEN UR STRIP-MCNC: NORMAL MG/DL
WBC # BLD AUTO: 5.5 10E3/UL (ref 4–11)
WBC URINE: 0 /HPF

## 2023-09-26 PROCEDURE — 120N000001 HC R&B MED SURG/OB

## 2023-09-26 PROCEDURE — 99214 OFFICE O/P EST MOD 30 MIN: CPT | Performed by: PHYSICIAN ASSISTANT

## 2023-09-26 PROCEDURE — 87637 SARSCOV2&INF A&B&RSV AMP PRB: CPT | Performed by: EMERGENCY MEDICINE

## 2023-09-26 PROCEDURE — 87040 BLOOD CULTURE FOR BACTERIA: CPT | Performed by: EMERGENCY MEDICINE

## 2023-09-26 PROCEDURE — 250N000011 HC RX IP 250 OP 636: Mod: JZ | Performed by: EMERGENCY MEDICINE

## 2023-09-26 PROCEDURE — 85025 COMPLETE CBC W/AUTO DIFF WBC: CPT | Performed by: STUDENT IN AN ORGANIZED HEALTH CARE EDUCATION/TRAINING PROGRAM

## 2023-09-26 PROCEDURE — 87651 STREP A DNA AMP PROBE: CPT | Performed by: EMERGENCY MEDICINE

## 2023-09-26 PROCEDURE — 84145 PROCALCITONIN (PCT): CPT | Performed by: EMERGENCY MEDICINE

## 2023-09-26 PROCEDURE — 96375 TX/PRO/DX INJ NEW DRUG ADDON: CPT

## 2023-09-26 PROCEDURE — 83605 ASSAY OF LACTIC ACID: CPT | Performed by: EMERGENCY MEDICINE

## 2023-09-26 PROCEDURE — 80053 COMPREHEN METABOLIC PANEL: CPT | Performed by: EMERGENCY MEDICINE

## 2023-09-26 PROCEDURE — 36415 COLL VENOUS BLD VENIPUNCTURE: CPT | Performed by: STUDENT IN AN ORGANIZED HEALTH CARE EDUCATION/TRAINING PROGRAM

## 2023-09-26 PROCEDURE — 85025 COMPLETE CBC W/AUTO DIFF WBC: CPT | Performed by: EMERGENCY MEDICINE

## 2023-09-26 PROCEDURE — 99285 EMERGENCY DEPT VISIT HI MDM: CPT | Mod: 25

## 2023-09-26 PROCEDURE — 99223 1ST HOSP IP/OBS HIGH 75: CPT | Performed by: STUDENT IN AN ORGANIZED HEALTH CARE EDUCATION/TRAINING PROGRAM

## 2023-09-26 PROCEDURE — 250N000013 HC RX MED GY IP 250 OP 250 PS 637: Performed by: STUDENT IN AN ORGANIZED HEALTH CARE EDUCATION/TRAINING PROGRAM

## 2023-09-26 PROCEDURE — 81001 URINALYSIS AUTO W/SCOPE: CPT | Performed by: EMERGENCY MEDICINE

## 2023-09-26 PROCEDURE — 99207 PR NO BILLABLE SERVICE THIS VISIT: CPT | Performed by: STUDENT IN AN ORGANIZED HEALTH CARE EDUCATION/TRAINING PROGRAM

## 2023-09-26 PROCEDURE — 250N000011 HC RX IP 250 OP 636: Mod: JZ | Performed by: STUDENT IN AN ORGANIZED HEALTH CARE EDUCATION/TRAINING PROGRAM

## 2023-09-26 PROCEDURE — 36415 COLL VENOUS BLD VENIPUNCTURE: CPT | Performed by: EMERGENCY MEDICINE

## 2023-09-26 PROCEDURE — 96365 THER/PROPH/DIAG IV INF INIT: CPT

## 2023-09-26 PROCEDURE — 82803 BLOOD GASES ANY COMBINATION: CPT

## 2023-09-26 RX ORDER — OXYCODONE HYDROCHLORIDE 5 MG/1
5 TABLET ORAL EVERY 4 HOURS PRN
Status: DISCONTINUED | OUTPATIENT
Start: 2023-09-26 | End: 2023-09-27

## 2023-09-26 RX ORDER — CEFAZOLIN SODIUM 2 G/100ML
2 INJECTION, SOLUTION INTRAVENOUS ONCE
Status: COMPLETED | OUTPATIENT
Start: 2023-09-26 | End: 2023-09-26

## 2023-09-26 RX ORDER — FUROSEMIDE 10 MG/ML
40 INJECTION INTRAMUSCULAR; INTRAVENOUS ONCE
Status: COMPLETED | OUTPATIENT
Start: 2023-09-26 | End: 2023-09-26

## 2023-09-26 RX ORDER — KETOROLAC TROMETHAMINE 15 MG/ML
15 INJECTION, SOLUTION INTRAMUSCULAR; INTRAVENOUS EVERY 6 HOURS
Status: DISCONTINUED | OUTPATIENT
Start: 2023-09-26 | End: 2023-09-27

## 2023-09-26 RX ORDER — ACETAMINOPHEN 325 MG/1
650 TABLET ORAL EVERY 6 HOURS
Status: DISCONTINUED | OUTPATIENT
Start: 2023-09-26 | End: 2023-09-27 | Stop reason: HOSPADM

## 2023-09-26 RX ADMIN — Medication 1 LOZENGE: at 23:54

## 2023-09-26 RX ADMIN — CEFAZOLIN SODIUM 2 G: 2 INJECTION, SOLUTION INTRAVENOUS at 23:05

## 2023-09-26 RX ADMIN — FUROSEMIDE 40 MG: 10 INJECTION, SOLUTION INTRAMUSCULAR; INTRAVENOUS at 23:54

## 2023-09-26 ASSESSMENT — ACTIVITIES OF DAILY LIVING (ADL): ADLS_ACUITY_SCORE: 35

## 2023-09-26 NOTE — TELEPHONE ENCOUNTER
Requesting new touch ultra test strips.  On fax, it requested a new pharmacy.  New pharmacy T'd up.    Ifeoma ALEX

## 2023-09-26 NOTE — TELEPHONE ENCOUNTER
Stopped amoxicillin early due to diarrhea. Currently has sore throat and some tooth pain in his back molars. Recommended UC tonight for possible worsening infection. Patient agreed to plan and will go there now.    Princess Marinelli RN

## 2023-09-27 ENCOUNTER — APPOINTMENT (OUTPATIENT)
Dept: PHYSICAL THERAPY | Facility: CLINIC | Age: 68
End: 2023-09-27
Attending: STUDENT IN AN ORGANIZED HEALTH CARE EDUCATION/TRAINING PROGRAM
Payer: COMMERCIAL

## 2023-09-27 VITALS
WEIGHT: 254.41 LBS | BODY MASS INDEX: 41.06 KG/M2 | OXYGEN SATURATION: 92 % | TEMPERATURE: 98.6 F | RESPIRATION RATE: 16 BRPM | SYSTOLIC BLOOD PRESSURE: 142 MMHG | DIASTOLIC BLOOD PRESSURE: 86 MMHG | HEART RATE: 81 BPM

## 2023-09-27 LAB
GLUCOSE BLDC GLUCOMTR-MCNC: 199 MG/DL (ref 70–99)
GLUCOSE BLDC GLUCOMTR-MCNC: 211 MG/DL (ref 70–99)
GLUCOSE BLDC GLUCOMTR-MCNC: 229 MG/DL (ref 70–99)

## 2023-09-27 PROCEDURE — 99239 HOSP IP/OBS DSCHRG MGMT >30: CPT | Performed by: INTERNAL MEDICINE

## 2023-09-27 PROCEDURE — 97161 PT EVAL LOW COMPLEX 20 MIN: CPT | Mod: GP

## 2023-09-27 PROCEDURE — 250N000013 HC RX MED GY IP 250 OP 250 PS 637: Performed by: STUDENT IN AN ORGANIZED HEALTH CARE EDUCATION/TRAINING PROGRAM

## 2023-09-27 PROCEDURE — G0378 HOSPITAL OBSERVATION PER HR: HCPCS

## 2023-09-27 PROCEDURE — 82962 GLUCOSE BLOOD TEST: CPT

## 2023-09-27 PROCEDURE — 250N000011 HC RX IP 250 OP 636: Performed by: STUDENT IN AN ORGANIZED HEALTH CARE EDUCATION/TRAINING PROGRAM

## 2023-09-27 PROCEDURE — 96376 TX/PRO/DX INJ SAME DRUG ADON: CPT

## 2023-09-27 PROCEDURE — 250N000012 HC RX MED GY IP 250 OP 636 PS 637: Performed by: STUDENT IN AN ORGANIZED HEALTH CARE EDUCATION/TRAINING PROGRAM

## 2023-09-27 PROCEDURE — 96375 TX/PRO/DX INJ NEW DRUG ADDON: CPT

## 2023-09-27 RX ORDER — NICOTINE POLACRILEX 4 MG
LOZENGE BUCCAL
Start: 2023-09-27 | End: 2024-08-01

## 2023-09-27 RX ORDER — NICOTINE POLACRILEX 4 MG
15-30 LOZENGE BUCCAL
Status: DISCONTINUED | OUTPATIENT
Start: 2023-09-27 | End: 2023-09-27 | Stop reason: HOSPADM

## 2023-09-27 RX ORDER — FUROSEMIDE 40 MG
40 TABLET ORAL DAILY
Qty: 7 TABLET | Refills: 0 | Status: SHIPPED | OUTPATIENT
Start: 2023-09-27 | End: 2023-11-02

## 2023-09-27 RX ORDER — NALOXONE HYDROCHLORIDE 0.4 MG/ML
0.4 INJECTION, SOLUTION INTRAMUSCULAR; INTRAVENOUS; SUBCUTANEOUS
Status: DISCONTINUED | OUTPATIENT
Start: 2023-09-27 | End: 2023-09-27 | Stop reason: HOSPADM

## 2023-09-27 RX ORDER — NALOXONE HYDROCHLORIDE 0.4 MG/ML
0.2 INJECTION, SOLUTION INTRAMUSCULAR; INTRAVENOUS; SUBCUTANEOUS
Status: DISCONTINUED | OUTPATIENT
Start: 2023-09-27 | End: 2023-09-27 | Stop reason: HOSPADM

## 2023-09-27 RX ORDER — ONDANSETRON 4 MG/1
4 TABLET, ORALLY DISINTEGRATING ORAL EVERY 6 HOURS PRN
Status: DISCONTINUED | OUTPATIENT
Start: 2023-09-27 | End: 2023-09-27 | Stop reason: HOSPADM

## 2023-09-27 RX ORDER — ONDANSETRON 2 MG/ML
4 INJECTION INTRAMUSCULAR; INTRAVENOUS EVERY 6 HOURS PRN
Status: DISCONTINUED | OUTPATIENT
Start: 2023-09-27 | End: 2023-09-27 | Stop reason: HOSPADM

## 2023-09-27 RX ORDER — DEXTROSE MONOHYDRATE 25 G/50ML
25-50 INJECTION, SOLUTION INTRAVENOUS
Status: DISCONTINUED | OUTPATIENT
Start: 2023-09-27 | End: 2023-09-27 | Stop reason: HOSPADM

## 2023-09-27 RX ORDER — OXYCODONE HYDROCHLORIDE 5 MG/1
5 TABLET ORAL EVERY 4 HOURS
Status: DISCONTINUED | OUTPATIENT
Start: 2023-09-27 | End: 2023-09-27 | Stop reason: HOSPADM

## 2023-09-27 RX ORDER — IBUPROFEN 400 MG/1
400 TABLET, FILM COATED ORAL EVERY 6 HOURS
Status: DISCONTINUED | OUTPATIENT
Start: 2023-09-27 | End: 2023-09-27 | Stop reason: HOSPADM

## 2023-09-27 RX ORDER — AMOXICILLIN 250 MG
1 CAPSULE ORAL 2 TIMES DAILY PRN
Status: DISCONTINUED | OUTPATIENT
Start: 2023-09-27 | End: 2023-09-27 | Stop reason: HOSPADM

## 2023-09-27 RX ORDER — AMOXICILLIN 250 MG
2 CAPSULE ORAL 2 TIMES DAILY PRN
Status: DISCONTINUED | OUTPATIENT
Start: 2023-09-27 | End: 2023-09-27 | Stop reason: HOSPADM

## 2023-09-27 RX ORDER — CLOBETASOL PROPIONATE 0.5 MG/G
CREAM TOPICAL 2 TIMES DAILY
COMMUNITY
End: 2023-12-24

## 2023-09-27 RX ORDER — MOMETASONE FUROATE 1 MG/G
CREAM TOPICAL DAILY PRN
COMMUNITY

## 2023-09-27 RX ORDER — HYDROMORPHONE HYDROCHLORIDE 1 MG/ML
0.5 INJECTION, SOLUTION INTRAMUSCULAR; INTRAVENOUS; SUBCUTANEOUS EVERY 6 HOURS PRN
Status: DISCONTINUED | OUTPATIENT
Start: 2023-09-27 | End: 2023-09-27 | Stop reason: HOSPADM

## 2023-09-27 RX ADMIN — INSULIN ASPART 3 UNITS: 100 INJECTION, SOLUTION INTRAVENOUS; SUBCUTANEOUS at 09:32

## 2023-09-27 RX ADMIN — OXYCODONE HYDROCHLORIDE 5 MG: 5 TABLET ORAL at 13:26

## 2023-09-27 RX ADMIN — ACETAMINOPHEN 650 MG: 325 TABLET, FILM COATED ORAL at 13:26

## 2023-09-27 RX ADMIN — ACETAMINOPHEN 650 MG: 325 TABLET, FILM COATED ORAL at 00:17

## 2023-09-27 RX ADMIN — INSULIN GLARGINE 20 UNITS: 100 INJECTION, SOLUTION SUBCUTANEOUS at 02:11

## 2023-09-27 RX ADMIN — OXYCODONE HYDROCHLORIDE 5 MG: 5 TABLET ORAL at 00:22

## 2023-09-27 RX ADMIN — ACETAMINOPHEN 650 MG: 325 TABLET, FILM COATED ORAL at 05:31

## 2023-09-27 RX ADMIN — OXYCODONE HYDROCHLORIDE 5 MG: 5 TABLET ORAL at 05:31

## 2023-09-27 RX ADMIN — HYDROMORPHONE HYDROCHLORIDE 0.5 MG: 1 INJECTION, SOLUTION INTRAMUSCULAR; INTRAVENOUS; SUBCUTANEOUS at 02:12

## 2023-09-27 RX ADMIN — HYDROMORPHONE HYDROCHLORIDE 0.5 MG: 1 INJECTION, SOLUTION INTRAMUSCULAR; INTRAVENOUS; SUBCUTANEOUS at 08:25

## 2023-09-27 RX ADMIN — DICLOFENAC SODIUM 4 G: 10 GEL TOPICAL at 02:12

## 2023-09-27 RX ADMIN — OXYCODONE HYDROCHLORIDE 5 MG: 5 TABLET ORAL at 09:37

## 2023-09-27 RX ADMIN — DICLOFENAC SODIUM 4 G: 10 GEL TOPICAL at 08:29

## 2023-09-27 ASSESSMENT — ACTIVITIES OF DAILY LIVING (ADL)
ADLS_ACUITY_SCORE: 35
ADLS_ACUITY_SCORE: 39
DEPENDENT_IADLS:: INDEPENDENT

## 2023-09-27 NOTE — PLAN OF CARE
Goal Outcome Evaluation:       Patient A&Ox4. VSS on RA. Bilateral lower legs reddness and swelling, weeping to RLE. Numbness and tingling present baseline. Up walking independently. Oxycodone, tylenol, and dilaudid x1 given for pain. Discharge instruction and medication went over with patient. Questions and concerns addressed. Patient discharge to home.

## 2023-09-27 NOTE — ED NOTES
PIT/Triage Evaluation    Patient presented with concern for bilateral lower extremity cellulitis.  He reports that he was hospitalized several months ago for this.  He reports that about a week ago he developed a sore throat and actually developed some throat pain.  He was started on some amoxicillin and then developed diarrhea.  He was tested for COVID and strep at that time and this was apparently negative.  He has since developed increasing redness and warmth on both sides and he notes and weeping wound on the right lower extremity.  No fevers.    Exam is notable for:  Marked bilateral lower extremity erythema and chronic venous stasis changes.  There is 1 small dime sized area weeping some clear serous fluid.  No purulent drainage.  Patient's heart rate is normal.    Appropriate interventions for symptom management were initiated if applicable.  Appropriate diagnostic tests were initiated if indicated.    Important information for subsequent clinician:  Ordered sepsis bundle for laboratory evaluation.  Symptoms are symmetric which is less concerning for lower extremity cellulitis but legs are markedly red.  Also ordered viral testing and repeat strep testing.    I briefly evaluated the patient and developed an initial plan of care. I discussed this plan and explained that this brief interaction does not constitute a full evaluation. Patient/family understands that they should wait to be fully evaluated and discuss any test results with another clinician prior to leaving the hospital.       Mimi Mtz MD  09/26/23 [id number]

## 2023-09-27 NOTE — PHARMACY-ADMISSION MEDICATION HISTORY
Pharmacist Admission Medication History    Admission medication history is complete. The information provided in this note is only as accurate as the sources available at the time of the update.    Medication reconciliation/reorder completed by provider prior to medication history? No    Information Source(s): Patient, CareEverywhere/SureScripts, and [facility] pain [facility]  via in-person    Pertinent Information: clarified implanted pump settings with [facility] and made changes to the bolus dosing.  Next refill Oct 14th    Changes made to PTA medication list:  Added: clobetasol cream, mometasone cream  Deleted: None  Changed: Candido-C, modafinil, Percocet    Allergies reviewed with patient and updates made in EHR:  done by RN    Medication History Completed By: Meghan Stuart RPH 9/27/2023 9:52 AM    Prior to Admission medications    Medication Sig Last Dose Taking? Auth Provider Long Term End Date   acetaminophen (TYLENOL) 500 MG tablet Take 1,000 mg by mouth every 8 hours as needed  at prn Yes Reported, Patient     ammonium lactate (AMLACTIN) 12 % external cream Apply topically daily To feet and legs daily & as needed 9/26/2023 at am Yes Regino Stanford MD     aspirin (ASPIRIN LOW DOSE) 81 MG tablet Take 1 tablet (81 mg) by mouth daily 9/26/2023 at am Yes Vince Henry MD No    atorvastatin (LIPITOR) 20 MG tablet Take 1.5 tablets (30 mg) by mouth daily 9/26/2023 at am Yes Sherwin Mejia, DO Yes    Bioflavonoid Products (CANDIDO-C) TABS Take 1,000 mg by mouth daily  Patient taking differently: Take 1,000 mg by mouth 2 times daily 9/26/2023 at am Yes Regino Stanford MD     calcium carbonate (OS-LUIS) 1500 (600 Ca) MG tablet Take 600 mg by mouth daily 9/26/2023 at am Yes Unknown, Entered By History     cariprazine (VRAYLAR) 4.5 MG capsule Take 1 capsule (4.5 mg) by mouth daily 9/26/2023 at am Yes Sherwin Mejia DO     clobetasol (TEMOVATE) 0.05 % external cream Apply topically 2 times  daily For psoriasis on lower body 9/26/2023 Yes Unknown, Entered By History     cyclobenzaprine (FLEXERIL) 10 MG tablet Take 1 tablet (10 mg) by mouth 3 times daily as needed for muscle spasms  at prn Yes Sherwin Mejia DO     DULoxetine (CYMBALTA) 30 MG capsule Take 1 capsule (30 mg) by mouth 2 times daily 9/26/2023 at am Yes Sherwin Mejia, DO Yes    Folic Acid-Vit B6-Vit B12 0.5-5-0.2 MG TABS Take 1 tablet by mouth daily 9/26/2023 at am Yes Regino Stanford MD     hydrochlorothiazide (HYDRODIURIL) 25 MG tablet Take 1.5 tablets (37.5 mg) by mouth daily 9/26/2023 at am Yes Sherwin Mejia, DO Yes    insulin glargine (LANTUS PEN) 100 UNIT/ML pen Inject 40 Units Subcutaneous At Bedtime 9/25/2023 at hs Yes Sherwin Mejia, DO Yes    loperamide (IMODIUM) 2 MG capsule Take 1 capsule (2 mg) by mouth 4 times daily as needed for diarrhea  at prn Yes Dashawn Franklin DO     losartan (COZAAR) 100 MG tablet Take 1 tablet (100 mg) by mouth daily 9/26/2023 at am Yes Sherwin Mejia, DO Yes    medication given by implanted intrathecal pump continuous Drug # 1: Fentanyl (Sublimaze) - Conc: 2000 mcg/mL - Total Dose / 24 hours: 1663.3 mcg  Drug # 2: Bupivacaine (Marcaine)  - Conc: 20 mg/mL - Total Dose / 24 hours: 16.633 mg  Drug # 3: Morphine (Duramorph or Infumorph)  - Conc: 9 mg/mL - Total Dose / 24 hours: 7.484 mg    Rate: 0.0325 mL/hr  Pump Reservoir Volume: 40 mL  Pump Reservoir Alarm Volume: 2 ml  Outside Clinic & Provider: [facility]  Last Refill Date: 5/18/2023  Next Refill Date: 6/25/2023  [place] Alarm Date:  Pump : [employer] SynchroMed II    Boluses: Up to 3 per day, 4 minute duration. Lock-out every 6 hours  Fentanyl: 110 mcg/dose  Bupivacaine: 1.1 mg/dose  Morphine: 0.49 mg/dose 9/6/2023 at continuous Yes Unknown, Entered By History     metFORMIN (GLUCOPHAGE XR) 500 MG 24 hr tablet Take 2 tablets (1,000 mg) by mouth 2 times daily  (with meals) 9/26/2023 at am Yes Sherwin Mejia, DO Yes    metoprolol succinate ER (TOPROL XL) 100 MG 24 hr tablet Take 1 tablet (100 mg) by mouth daily 9/26/2023 at am Yes Sherwin Mejia, DO Yes    metroNIDAZOLE (FLAGYL) 500 MG tablet Crush and sprinkle light layer over right leg, left leg and right toe wounds daily 9/25/2023 Yes Regino Stanford MD     modafinil (PROVIGIL) 200 MG tablet TAKE ONE AND ONE-HALF TABLETS BY MOUTH EVERY DAY  Patient taking differently: Take 400 mg by mouth daily Pt has increased his dose to 400mg daily from 300mg daily.  Has a message in to his physician for a dose increase. 9/26/2023 at am Yes Sherwin Mejia DO     mometasone (ELOCON) 0.1 % external cream Apply topically daily For psoriasis on face 9/26/2023 Yes Unknown, Entered By History     multivitamin w/minerals (THERA-VIT-M) tablet Take 1 tablet by mouth daily Men's 50+ 9/26/2023 at am Yes Unknown, Entered By History     nitroGLYcerin (NITROSTAT) 0.4 MG sublingual tablet Place 1 tablet (0.4 mg) under the tongue every 5 minutes as needed for chest pain For chest pain place 1 tablet under the tongue every 5 minutes for 3 doses. If symptoms persist 5 minutes after 1st dose call 911.  at prn Yes Sherwin Mejia, DO Yes    OVER-THE-COUNTER Take 3 capsules by mouth daily Supplement for brain health, unknown brand/ingredients 9/26/2023 at am Yes Unknown, Entered By History     oxyCODONE-acetaminophen (PERCOCET) 5-325 MG tablet Take 1 tablet by mouth 2 times daily as needed for pain  at prn Yes Unknown, Entered By History     POTASSIUM PO Take 1 tablet by mouth daily Unspecified tablet strength; patient estimated 600 mg, takes for leg cramps 9/26/2023 at am Yes Unknown, Entered By History     pregabalin (LYRICA) 100 MG capsule Take 1 capsule (100 mg) by mouth daily as needed (In addition to 100mg 3 times per day scheduled)  at prn Yes Sherwin Mejia, DO Yes    pregabalin (LYRICA) 100 MG  capsule Take 1 capsule (100 mg) by mouth 3 times daily 9/26/2023 at am Yes Sherwin Mejia, DO Yes    senna-docusate (SENOKOT-S/PERICOLACE) 8.6-50 MG tablet Take 1-2 tablets by mouth 2 times daily as needed  at prn Yes Unknown, Entered By History     SUMAtriptan (IMITREX) 50 MG tablet Take 1 tablet (50 mg) by mouth at onset of headache for migraine May repeat in 2 hours. Max 4 tablets/24 hours.  at prn Yes Maribeth Ardon MD     tamsulosin (FLOMAX) 0.4 MG capsule Take 1 capsule (0.4 mg) by mouth 2 times daily 9/26/2023 at am Yes Sherwin Mejia, DO     testosterone (ANDROGEL/TESTIM) 50 MG/5GM (1%) topical gel Place 1 packet (50 mg of testosterone) onto the skin daily 9/26/2023 at am Yes Sherwin Mejia, DO Yes    vitamin B-Complex Take 3 tablets by mouth daily 9/26/2023 at am Yes Unknown, Entered By History     vitamin D3 (CHOLECALCIFEROL) 50 mcg (2000 units) tablet Take 1 tablet (50 mcg) by mouth daily 9/26/2023 at am Yes Sherwin Mejia, DO     blood glucose (NO BRAND SPECIFIED) lancets standard Use to test blood sugar 1 time daily or as directed.   Sherwin Mejia, DO     blood glucose (NO BRAND SPECIFIED) test strip Use to test blood sugar 1 time daily or as directed.   Sherwin eMjia, DO     buPROPion (WELLBUTRIN XL) 150 MG 24 hr tablet Take 1 tablet (150 mg) by mouth every morning   Sherwin Mejia, DO Yes    glucose 40 % (400 mg/mL) gel Take 15-30 g by mouth every 15 minutes as needed for low blood sugar  Patient taking differently: Take by mouth every 15 minutes as needed for low blood sugar   Dashawn Franklin, DO     insulin pen needle (31G X 8 MM) 31G X 8 MM miscellaneous Use one pen needles daily or as directed.   Sherwin Mejia, DO

## 2023-09-27 NOTE — PROGRESS NOTES
Care Management Discharge Note    Discharge Date: 09/27/2023       Discharge Disposition: Home    Discharge Services: None    Discharge DME: None    Discharge Transportation: car, drives self    Private pay costs discussed: Not applicable    Does the patient's insurance plan have a 3 day qualifying hospital stay waiver?  No    PAS Confirmation Code:    Patient/family educated on Medicare website which has current facility and service quality ratings: no    Education Provided on the Discharge Plan:    Persons Notified of Discharge Plans: patient  Patient/Family in Agreement with the Plan: yes    Handoff Referral Completed: No    Additional Information:  Discharge to home today.    Anjelica Epps RN   [facility]   Phone [phone] or [phone]

## 2023-09-27 NOTE — PROGRESS NOTES
.RECEIVING UNIT ED HANDOFF REVIEW    ED Nurse Handoff Report was reviewed by: Nancy Tidwell RN on September 27, 2023 at 12:01 AM

## 2023-09-27 NOTE — DISCHARGE SUMMARY
[facility]    Discharge Summary  Hospitalist    Date of Admission:  9/26/2023  Date of Discharge:  9/27/2023  Discharging Provider: Josiane White,     Discharge Diagnoses   Venous stasis dermattisis  Chronic Lymphedema  CAD s/p PCI in 2021  Hypertension  Hyperlipidemia  Amyloidosis, s/p autologous stem cell transplant  NORMA  Chronic Fatigue  Morbid obesity  CO2 retention  DM II, with polyneuropathy  Chronic pain  Plaque Psoriasis  Hx of Cirrhosis  Bipolar 1, in remission  Anxiety    History of Present Illness   Parmjit Hernández is a 68 year old male with complex PMHx which includes hypertension, CAD, amyloidosis with hx of autologous stem cell transplant, morbid obesity, chronic fatigue, NORMA, DM II with polyneuropathy, plaque psoriasis, hx of cirrhosis, bipolar disorder and anxiety among other medical problems who was admitted under observation status on 9/26/2023 for management of venous stasis dermatitis.      Hospital Course   Parmjit Hernández was admitted on 9/26/2023.  The following problems were addressed during his hospitalization:    Venous stasis dermattisis  Chronic Lymphedema  *Initially presented to the ED for evaluation of worsening skin wounds on his RLE. Had been seen in UC and was referred to the ED for evluation dt concern for infection. Reported worsening erythema bilaterally and some weeping from RLE wound. No significant pain.  *In ED, was afebrile and hemodynamically stable. WBC nl. LEs notable for edema/erythema with fluid blisters. Procal 0.21 (mod risk). In ED, was given a dose of Ancef and IV Lasix. No overt evidence of infection so abx not continued on discharge.   *Responded well to initial dose of IV Lasix.  No evidence to suggest active infection  *Discharged on 7-day course of Lasix 40 mg p.o. daily. Has lab draw scheduled for 9/28/23. Can recheck potassium at that time has follow-up with PCP in the next week. Can assess response and continue oral Lasix  if indicated. Keep lower extremities elevated. Continue edema wear as previously advised  *OP follow up with wound clinic/Dr. Stanford -- cont topical metronidazole as prescribed     CAD s/p PCI in 2021  Hypertension  Hyperlipidemia  *Chronic and stable on ASA, statin, metoprolol, losartan and HCTZ.   *Given dose of IV Lasix on admission as above with good response  *Hold HCTZ and cont with oral Lasix as above.     Amyloidosis, s/p autologous stem cell transplant  *Completed 8 months of chemotherapy at [facility]. Not on immunosuppressants.  *No specific concerns this stay.      NORMA  Chronic Fatigue  Morbid obesity  CO2 retention  *Not compliant with cpap as he lost it.  *PCO2 48 on initial VBG suggestive of possible OHS.  *Chronic and stable on Provigil.  *Continue follow-up with outpatient providers as scheduled.     DM II, with polyneuropathy  *A1c 9.8 in 9/2023. Manages with Lantus 40U HS, metformin. Also takes duloxetine and Lyrica for neuropathy.  No changes made to diabetes management this day     Chronic pain  *Has intrathecal pain pump. Also manages with Flexeril, Cymbalta, Percocet prn, Lyrica.     Plaque Psoriasis  *Chronic and stable on clobetasol     Hx of Cirrhosis  *Compensated, LFTs nl this stay.      Bipolar 1, in remission  Anxiety  *Chronic and stable on home meds. No concerns.     Josiane White,     Pending Results   These results will be followed up by PCP  Unresulted Labs Ordered in the Past 30 Days of this Admission       Date and Time Order Name Status Description    9/26/2023  9:26 PM Blood Culture Peripheral Blood In process     9/26/2023  9:26 PM Blood Culture Peripheral Blood In process             Code Status   Full Code       Primary Care Physician   Sherwin Mcdermott    Physical Exam   Temp: 98.6  F (37  C) Temp src: Oral BP: (!) 142/86 Pulse: 81   Resp: 16 SpO2: 92 % O2 Device: None (Room air)    Vitals:    09/27/23 0627   Weight: 115.4 kg (254 lb 6.6 oz)     Vital  Signs with Ranges  Temp:  [97.8  F (36.6  C)-98.6  F (37  C)] 98.6  F (37  C)  Pulse:  [80-90] 81  Resp:  [16-18] 16  BP: (109-162)/(70-99) 142/86  SpO2:  [92 %-96 %] 92 %  I/O last 3 completed shifts:  In: -   Out: 2500 [Urine:2500]    General: Resting comfortably, alert, conversive, NAD  CVS: HRRR, no MGR  Respiratory: CTAB, no wheeze/rales/rhonchi, no increased work of breathing  GI: obese, S, NT, ND, +BS  Ext: ++bilateral LE lymphedema with erythema and warmth  Skin: Warm/dry, small superficial wound on R medial thigh    Discharge Disposition   Discharged to home  Condition at discharge: Stable    Consultations This Hospital Stay   PHYSICAL THERAPY ADULT IP CONSULT  CARE MANAGEMENT / SOCIAL WORK IP CONSULT    Time Spent on this Encounter   IJosiane DO, personally saw the patient today and spent greater than 30 minutes discharging this patient.    Discharge Orders      Reason for your hospital stay    Management of the worsening edema in your legs. You did not have any evidence of infection. Your diuretic was changed to Lasix.     Follow-up and recommended labs and tests     Follow up with your PCP as scheduled on 10/5/23  Follow up with Dr. Stanford in wound clinic as needed.   Follow up with your other specialists as scheduled.     Activity    Your activity upon discharge: activity as tolerated     Diet    Follow this diet upon discharge: 2g salt     Discharge Medications   Current Discharge Medication List        START taking these medications    Details   furosemide (LASIX) 40 MG tablet Take 1 tablet (40 mg) by mouth daily for 7 days  Qty: 7 tablet, Refills: 0    Associated Diagnoses: Lymphedema           CONTINUE these medications which have CHANGED    Details   glucose 40 % (400 mg/mL) gel Take by mouth every 15 minutes as needed for low blood sugar    Associated Diagnoses: Type 2 diabetes mellitus without complication, unspecified whether long term insulin use (H)           CONTINUE these  medications which have NOT CHANGED    Details   acetaminophen (TYLENOL) 500 MG tablet Take 1,000 mg by mouth every 8 hours as needed      ammonium lactate (AMLACTIN) 12 % external cream Apply topically daily To feet and legs daily & as needed  Qty: 385 g, Refills: 1    Associated Diagnoses: Non-pressure ulcer of right lower extremity with fat layer exposed (H); Chronic ulcer of left leg with fat layer exposed (H)      aspirin (ASPIRIN LOW DOSE) 81 MG tablet Take 1 tablet (81 mg) by mouth daily  Qty: 30 tablet, Refills: 3    Associated Diagnoses: Hyperlipidemia LDL goal <100; Hypertension goal BP (blood pressure) < 140/90      atorvastatin (LIPITOR) 20 MG tablet Take 1.5 tablets (30 mg) by mouth daily  Qty: 135 tablet, Refills: 3    Associated Diagnoses: Hyperlipidemia LDL goal <100      Bioflavonoid Products (CANDIDO-C) TABS Take 1,000 mg by mouth daily  Qty: 90 tablet, Refills: 3    Associated Diagnoses: Chronic ulcer of right leg, limited to breakdown of skin (H)      calcium carbonate (OS-LUIS) 1500 (600 Ca) MG tablet Take 600 mg by mouth daily      cariprazine (VRAYLAR) 4.5 MG capsule Take 1 capsule (4.5 mg) by mouth daily  Qty: 90 capsule, Refills: 3    Associated Diagnoses: Bipolar I disorder (H)      clobetasol (TEMOVATE) 0.05 % external cream Apply topically 2 times daily For psoriasis on lower body      cyclobenzaprine (FLEXERIL) 10 MG tablet Take 1 tablet (10 mg) by mouth 3 times daily as needed for muscle spasms  Qty: 60 tablet, Refills: 3    Associated Diagnoses: Spinal stenosis at L4-L5 level      DULoxetine (CYMBALTA) 30 MG capsule Take 1 capsule (30 mg) by mouth 2 times daily  Qty: 180 capsule, Refills: 1    Associated Diagnoses: Depressed bipolar I disorder in full remission (H24)      Folic Acid-Vit B6-Vit B12 0.5-5-0.2 MG TABS Take 1 tablet by mouth daily  Qty: 90 tablet, Refills: 3    Associated Diagnoses: Chronic ulcer of right leg, limited to breakdown of skin (H)      insulin glargine (LANTUS PEN)  100 UNIT/ML pen Inject 40 Units Subcutaneous At Bedtime  Qty: 15 mL, Refills: 5    Comments: If Lantus is not covered by insurance, may substitute Basaglar or Semglee or other insulin glargine product per insurance preference at same dose and frequency.    Associated Diagnoses: Type 2 diabetes mellitus without complication, unspecified whether long term insulin use (H)      loperamide (IMODIUM) 2 MG capsule Take 1 capsule (2 mg) by mouth 4 times daily as needed for diarrhea    Associated Diagnoses: Loose stools      losartan (COZAAR) 100 MG tablet Take 1 tablet (100 mg) by mouth daily  Qty: 90 tablet, Refills: 3    Associated Diagnoses: Benign essential hypertension      medication given by implanted intrathecal pump continuous Drug # 1: Fentanyl (Sublimaze) - Conc: 2000 mcg/mL - Total Dose / 24 hours: 1663.3 mcg  Drug # 2: Bupivacaine (Marcaine)  - Conc: 20 mg/mL - Total Dose / 24 hours: 16.633 mg  Drug # 3: Morphine (Duramorph or Infumorph)  - Conc: 9 mg/mL - Total Dose / 24 hours: 7.484 mg    Rate: 0.0325 mL/hr  Pump Reservoir Volume: 40 mL  Pump Reservoir Alarm Volume: 2 ml  Outside Clinic & Provider: [facility]  Last Refill Date: 5/18/2023  Next Refill Date: 6/25/2023  Low [place] Alarm Date:  Pump : onefinestay SynchroMed II    Boluses: Up to 3 per day, 4 minute duration. Lock-out every 6 hours  Fentanyl: 110 mcg/dose  Bupivacaine: 1.1 mg/dose  Morphine: 0.49 mg/dose      metFORMIN (GLUCOPHAGE XR) 500 MG 24 hr tablet Take 2 tablets (1,000 mg) by mouth 2 times daily (with meals)  Qty: 360 tablet, Refills: 3    Associated Diagnoses: Type 2 diabetes mellitus with other specified complication, without long-term current use of insulin (H)      metoprolol succinate ER (TOPROL XL) 100 MG 24 hr tablet Take 1 tablet (100 mg) by mouth daily  Qty: 90 tablet, Refills: 3    Associated Diagnoses: Benign essential hypertension      metroNIDAZOLE (FLAGYL) 500 MG tablet Crush and sprinkle  light layer over right leg, left leg and right toe wounds daily  Qty: 60 tablet, Refills: 0    Associated Diagnoses: Non-pressure ulcer of right lower extremity with fat layer exposed (H); Chronic ulcer of left leg with fat layer exposed (H)      modafinil (PROVIGIL) 200 MG tablet TAKE ONE AND ONE-HALF TABLETS BY MOUTH EVERY DAY  Qty: 45 tablet, Refills: 0    Associated Diagnoses: Obstructive sleep apnea syndrome      mometasone (ELOCON) 0.1 % external cream Apply topically daily For psoriasis on face      multivitamin w/minerals (THERA-VIT-M) tablet Take 1 tablet by mouth daily Men's 50+      nitroGLYcerin (NITROSTAT) 0.4 MG sublingual tablet Place 1 tablet (0.4 mg) under the tongue every 5 minutes as needed for chest pain For chest pain place 1 tablet under the tongue every 5 minutes for 3 doses. If symptoms persist 5 minutes after 1st dose call 911.  Qty: 30 tablet, Refills: 1    Associated Diagnoses: NSTEMI (non-ST elevated myocardial infarction) (H)      OVER-THE-COUNTER Take 3 capsules by mouth daily Supplement for brain health, unknown brand/ingredients      oxyCODONE-acetaminophen (PERCOCET) 5-325 MG tablet Take 1 tablet by mouth 2 times daily as needed for pain      POTASSIUM PO Take 1 tablet by mouth daily Unspecified tablet strength; patient estimated 600 mg, takes for leg cramps      !! pregabalin (LYRICA) 100 MG capsule Take 1 capsule (100 mg) by mouth daily as needed (In addition to 100mg 3 times per day scheduled)  Qty: 30 capsule, Refills: 2    Associated Diagnoses: Peripheral polyneuropathy      !! pregabalin (LYRICA) 100 MG capsule Take 1 capsule (100 mg) by mouth 3 times daily  Qty: 30 capsule, Refills: 2    Associated Diagnoses: Peripheral polyneuropathy      senna-docusate (SENOKOT-S/PERICOLACE) 8.6-50 MG tablet Take 1-2 tablets by mouth 2 times daily as needed      SUMAtriptan (IMITREX) 50 MG tablet Take 1 tablet (50 mg) by mouth at onset of headache for migraine May repeat in 2 hours. Max 4  tablets/24 hours.  Qty: 8 tablet, Refills: 1    Associated Diagnoses: Mixed common migraine and muscle contraction headache      tamsulosin (FLOMAX) 0.4 MG capsule Take 1 capsule (0.4 mg) by mouth 2 times daily  Qty: 180 capsule, Refills: 3    Associated Diagnoses: Urinary retention with incomplete bladder emptying      testosterone (ANDROGEL/TESTIM) 50 MG/5GM (1%) topical gel Place 1 packet (50 mg of testosterone) onto the skin daily  Qty: 30 packet, Refills: 1    Associated Diagnoses: Decreased testosterone level      vitamin B-Complex Take 3 tablets by mouth daily      vitamin D3 (CHOLECALCIFEROL) 50 mcg (2000 units) tablet Take 1 tablet (50 mcg) by mouth daily  Qty: 90 tablet, Refills: 3    Associated Diagnoses: Vitamin D deficiency      blood glucose (NO BRAND SPECIFIED) lancets standard Use to test blood sugar 1 time daily or as directed.  Qty: 100 Lancet, Refills: 4    Associated Diagnoses: Type 2 diabetes mellitus with other specified complication, without long-term current use of insulin (H)      blood glucose (NO BRAND SPECIFIED) test strip Use to test blood sugar 1 time daily or as directed.  Qty: 200 strip, Refills: 1    Associated Diagnoses: Type 2 diabetes mellitus with other specified complication, without long-term current use of insulin (H)      buPROPion (WELLBUTRIN XL) 150 MG 24 hr tablet Take 1 tablet (150 mg) by mouth every morning  Qty: 90 tablet, Refills: 3    Associated Diagnoses: Depressed bipolar I disorder in full remission (H24)      insulin pen needle (31G X 8 MM) 31G X 8 MM miscellaneous Use one pen needles daily or as directed.  Qty: 100 each, Refills: 1    Associated Diagnoses: Diabetes mellitus without complication (H)       !! - Potential duplicate medications found. Please discuss with provider.        STOP taking these medications       hydrochlorothiazide (HYDRODIURIL) 25 MG tablet Comments:   Reason for Stopping:             Allergies   Allergies   Allergen Reactions     Cephalexin Diarrhea    Liraglutide Other (See Comments), Headache and Unknown     Other reaction(s): Headache, Unknown  PN: migraines - Increased migraine frequency and severity      Sulfa Antibiotics Angioedema and Swelling     Pt has taken  Taken sulfa drugs orally without trouble. He had problems with Sulfa eye drops. Eye swelled up       Data   Most Recent 3 CBC's:  Recent Labs   Lab Test 09/26/23 2131 09/18/23 1629 08/01/23  1146   WBC 5.5 5.1 6.3   HGB 13.4 13.9 14.3   MCV 94 94 92    121* 137*      Most Recent 3 BMP's:  Recent Labs   Lab Test 09/27/23  0824 09/27/23  0625 09/27/23  0049 09/26/23 2131 09/18/23 1629 08/01/23  1146   NA  --   --   --  133* 135* 133*   POTASSIUM  --   --   --  4.2 4.1 4.2   CHLORIDE  --   --   --  95* 98 96*   CO2  --   --   --  26 27 23   BUN  --   --   --  18.0 17.6 28.2*   CR  --   --   --  0.94 0.84 1.01   ANIONGAP  --   --   --  12 10 14   LUIS  --   --   --  9.5 9.3 9.4   * 229* 199* 169* 321* 209*     Most Recent 2 LFT's:  Recent Labs   Lab Test 09/26/23 2131 09/18/23  1629   AST 24 34   ALT 36 54   ALKPHOS 97 108   BILITOTAL 0.4 0.3       Results for orders placed or performed in visit on 08/01/23   XR Chest 2 Views    Narrative    CHEST TWO VIEWS August 1, 2023 11:35 AM     HISTORY: 68-year-old patient with history of hypoxia.       Impression    IMPRESSION: Since May 26, 2023, heart size remains enlarged. No  pleural effusion, pneumothorax, or abnormal area of consolidation.  Pulmonary vascularity may be slightly prominent, though distinct.    TARA POLLARD MD         SYSTEM ID:  [id number]     *Note: Due to a large number of results and/or encounters for the requested time period, some results have not been displayed. A complete set of results can be found in Results Review.

## 2023-09-27 NOTE — PLAN OF CARE
Date & Time: 0000-0700.  Surgery/POD#: Here for Bilateral lower extremity cellulitis & sore throat.   Behavior & Aggression: Green - no concerns.  Fall Risk: No.  Orientation: A&Ox4.  ABNL VS/O2: VSS on RA.  ABNL Labs: see chart.  Pain Management: Scheduled Oxy & Tylenol, PRN Dilaudid.  Bowel/Bladder: Continent, voiding via urinal, active bowel sounds, no BM, passing gas per pt.  IV/Drains/ incisions: PIV SL. BLE swelling/edema/redness present, noticed small open wound on LLE and Pt has plaque psoriasis scattered throughout body.  Diet: Low fat/cardiac/ no caffeine.  Activity Level: SBA.  Tests/Procedures: N/A.  Anticipated  DC Date: TBD.

## 2023-09-27 NOTE — CONSULTS
Care Management Initial Consult    General Information  Assessment completed with: Patient,    Type of CM/SW Visit: Initial Assessment    Primary Care Provider verified and updated as needed: Yes   Readmission within the last 30 days: no previous admission in last 30 days      Reason for Consult: other (see comments) (elevated readmission risk score)  Advance Care Planning: Advance Care Planning Reviewed: verified with patient, no concerns identified          Communication Assessment  Patient's communication style: spoken language (English or Bilingual)    Hearing Difficulty or Deaf: no        Cognitive  Cognitive/Neuro/Behavioral: WDL  Level of Consciousness: alert  Arousal Level: opens eyes spontaneously  Orientation: oriented x 4             Living Environment:   People in home: alone     Current living Arrangements: apartment      Able to return to prior arrangements: yes       Family/Social Support:  Care provided by: self  Provides care for: no one  Marital Status: Single  Children, Other (specify) (friend also listed as emergency contacts)          Description of Support System:           Current Resources:   Patient receiving home care services: No     Community Resources: None  Equipment currently used at home: none  Supplies currently used at home: Diabetic Supplies, Wound Care Supplies, Other (cpap)    Employment/Financial:  Employment Status: employed full-time, self-employed        Financial Concerns: No concerns identified   Referral to Financial Worker: No       Does the patient's insurance plan have a 3 day qualifying hospital stay waiver?  No    Lifestyle & Psychosocial Needs:  Social Determinants of Health     Food Insecurity: No Food Insecurity (5/13/2020)    Hunger Vital Sign     Worried About Running Out of Food in the Last Year: Never true     Ran Out of Food in the Last Year: Never true   Depression: Not at risk (8/31/2023)    PHQ-2     PHQ-2 Score: 0   Housing Stability: Not on file   Tobacco  Use: Low Risk  (9/18/2023)    Patient History     Smoking Tobacco Use: Never     Smokeless Tobacco Use: Never     Passive Exposure: Not on file   Financial Resource Strain: Low Risk  (5/13/2020)    Overall Financial Resource Strain (CARDIA)     Difficulty of Paying Living Expenses: Not very hard   Alcohol Use: Not on file   Transportation Needs: No Transportation Needs (5/13/2020)    PRAPARE - Transportation     Lack of Transportation (Medical): No     Lack of Transportation (Non-Medical): No   Physical Activity: Unknown (5/13/2020)    Exercise Vital Sign     Days of Exercise per Week: 1 day     Minutes of Exercise per Session: Not on file   Interpersonal Safety: Not At Risk (3/17/2023)    Humiliation, Afraid, Rape, and Kick questionnaire     Fear of Current or Ex-Partner: No     Emotionally Abused: No     Physically Abused: No     Sexually Abused: No   Stress: Not on file   Social Connections: Not on file       Functional Status:  Prior to admission patient needed assistance:   Dependent ADLs:: Independent  Dependent IADLs:: Independent       Mental Health Status:  Mental Health Status: No Current Concerns       Chemical Dependency Status:  Chemical Dependency Status: No Current Concerns             Values/Beliefs:  Spiritual, Cultural Beliefs, Gnosticist Practices, Values that affect care: no               Additional Information:  Initial consult done with patient. Patient lives alone independently in an apartment. Patient does not anticipate discharge needs.     Anjelica Epps RN   [facility]   Phone [phone] or [phone]

## 2023-09-27 NOTE — ED TRIAGE NOTES
Patient presents with bilateral lower leg redness, swelling, and pain. He reports hx. Of cellulitis in legs and states this feels similar.  He was recently diagnosed with strep and was taking amoxicillin. Throat pain initially improved with antibiotics, but the pain returned after finishing antibiotic.     Triage Assessment       Row Name 09/26/23 6461       Triage Assessment (Adult)    Airway WDL WDL       Respiratory WDL    Respiratory WDL WDL       Skin Circulation/Temperature WDL    Skin Circulation/Temperature WDL WDL       Cardiac WDL    Cardiac WDL WDL       Peripheral/Neurovascular WDL    Peripheral Neurovascular WDL WDL       Cognitive/Neuro/Behavioral WDL    Cognitive/Neuro/Behavioral WDL WDL

## 2023-09-27 NOTE — H&P
Assessment/Plan    68M hx of chronic venous stasis, HTN, NORMA, morbidy obesity, T2D complicated by neuropathy, Bipolar/anxiety, CAD s/p PCI, Amyloidosis s/p autologous stem cell transplant presenting with LE edema and pain. Found to have venous stasis dermatitis.     Venous stasis dermattisis  Chronic Lymphadema  `No systemic changes in vitals/WBC  `ED gave patient ancef  `Procalcitonin 0.21  `B/l LE edema/erythema, with fluid blisters.  --No indication to begin abx  --Trendelenburg position with pillows under legs  --SCDs to help reduce edema  --PT eval  --1x dose 40mg Lasix IV  --Standing tylenol/oxy with PRN dilaudid  --fall precautions    CAD  HTN  `SBP in FSH 140s  `s/p PCI 2021  --Can resume metoprolol/aspriin once confirmed by pharmacy  --can resume hydrochlorothiazide/Losartan once confirmed by pharmacy  --1x dose lasix IV    Amyloidosis  S/p Autologous stem cell transplant  `completed 8 months of chemotherapy at [facility]  `not on immunosuppressants  --no acute intervention    NORMA  Chronic Fatigue  Morbid obesity  CO2 retention  `not compliant with cpap as he lost it  `co2 48 suggestive of possible OHS  --Will need outpatient bariatric followup  --Nocturnal Pulse oxymetry    T2D  Polyneuropathy  `On 40u lantus once a day  `Taking duloxetine and pregablin for neuropathy  --Will give 1x dose 20u Lantus  --ISS  --hold metformin  --Resume home insulin once confirmed  --Resume pregablin/duloxetine once confirmed    Plaque Psoriasis  `on clobetasol  --Can continue clobetasol once confirmed    Hx of Cirrhosis  `Compensated  `LFTs wnl  --avoid nsaids    Bipolar 1 in full remission  Anxiety  `Mood is well  --Can continue home flexeril once confirmed    Supportive Care  DVT ppx: SCDs  Diet: low salt  Pain Control: Tylenol, NSAIDs standing, with oxy as prn  Upper GI ppx: Not indicated  Lower GI ppx: PRN senna  Lines/Catheters: IV  TTE/Dopplers: Not indicated  Contact Precautions: None  PT/OT/Social/Wound/Palliative  Consults: PT  Code Status: Full    History of Present Illness  Subjective:  Presenting with complaints of severe B/l LE pain for the past 3 days. Pain started during the weekend due to decreased physical activity. No fevers/chills, can ambulate on his legs. Compliant with compressing stockings/HCTZ. States he had recurrent hospitalization for similar presentation. Endorses low salt diet, seen in ED eating chips/cola, however, patient stated that he had no food the entire day and it was provided by the hospital.     *of note Patient recently completed a 3day abx course of augmentin twice for viral illness/progressive sore throat. No cough/fevers. Stated the augmentin caused diarrhea    ED course: ancef given    ROS: 10 point ROS neg other than the symptoms noted above in the HPI.     Past Medical Hx:   Past Medical History:   Diagnosis Date    Acquired lymphedema 2/4/2019    Allergic state     Amyloidosis (H)     followed by hematology Dr. Romeo status post peripheral stem cell transplant    Anxiety 5/11/2016    Anxiety and depression 12/18/2013    Bipolar I disorder (H) 9/1/2015    Under care of psychiatrist [facility]- Dr Rahman doxepin 25 mg, 2 cap bedtime DULoxetine 20 mg once daily  OLANZapine 7.5 mg  1 tab bedtime     DDD (degenerative disc disease), lumbar 8/6/2020    Depressive disorder 1995    EKG abnormality 4/20/2020    H/O bone marrow transplant (H)     Headache(784.0)     History of diverticulitis 1/27/2015    1/15-rxed with antibiotics     Hyperlipidemia LDL goal <100 10/31/2010    Hypertension 2007    Hypertension goal BP (blood pressure) < 140/90 10/11/2011    Marianne (H) 8/20/2015    Morbid obesity (H) 8/28/2018    Myalgia and myositis, unspecified     Narcotic dependence (H) 9/6/2016    None in about 6 months- 8/1/17    NSTEMI (non-ST elevated myocardial infarction) (H) 04/19/2020    OCD (obsessive compulsive disorder)     Other acquired absence of organ 94    Other cirrhosis of liver (H) possibly from  vences  4/15/2020    Other specified viral warts     Peripheral edema 5/20/2018    Noncardiac Continue with  furosemide (LASIX) 20 MG tablet,  potassium       chloride SA (K-DUR/KLOR-CON M) 10 MEQ CR  Tab once daily  Basic metabolic panel    Polyneuropathy associated with underlying disease (H) 2/4/2019    Restless legs syndrome (RLS) 6/29/2017    Stasis dermatitis of both legs 12/31/2018    Type 2 diabetes mellitus with other specified complication (H) 7/10/2017        Home Medications: Present in Med Rec    Allergies:   Allergies   Allergen Reactions    Cephalexin Diarrhea    Liraglutide Other (See Comments), Headache and Unknown     Other reaction(s): Headache, Unknown  PN: migraines - Increased migraine frequency and severity      Sulfa Antibiotics Angioedema and Swelling     Pt has taken  Taken sulfa drugs orally without trouble. He had problems with Sulfa eye drops. Eye swelled up          Pertinent Family Hx:   Family History   Problem Relation Age of Onset    Cerebrovascular Disease Mother     C.A.D. Mother     Hypertension Mother     Alcohol/Drug Mother     Arthritis Mother     Cerebrovascular Disease Maternal Grandmother     C.A.D. Maternal Grandmother     Alcohol/Drug Maternal Grandmother     C.A.D. Father     Heart Disease Father     Hypertension Father     Alcohol/Drug Father     Allergies Father     Circulatory Father     Depression Father     Respiratory Father     Asthma Father     Alcohol/Drug Paternal Grandfather     Cerebrovascular Disease Paternal Grandfather     Depression Son     Psychotic Disorder Son         anxiety disorder    Depression Daughter     Cerebrovascular Disease Maternal Grandfather     Cerebrovascular Disease Paternal Grandmother     Diabetes Brother     Allergies Sister     Depression Sister     Gynecology Sister         Surgical Hx:   Past Surgical History:   Procedure Laterality Date    ABDOMEN SURGERY  2007    abdominal hernia    BACK SURGERY  8/6/2020    BIOPSY  june 2015     negative    BMT PROTOCOL      for systemic amyloidosis    BONE MARROW BIOPSY, BONE SPECIMEN, NEEDLE/TROCAR N/A 6/8/2016    Procedure: BIOPSY BONE MARROW;  Surgeon: Nathan Agrawal MD;  Location:  GI    BONE MARROW BIOPSY, BONE SPECIMEN, NEEDLE/TROCAR N/A 2/20/2019    Procedure: BIOPSY BONE MARROW;  Surgeon: Michael Raygoza MD;  Location:  GI    CHOLECYSTECTOMY  1995    lap qian    COLONOSCOPY  2012    hx polyps    ESOPHAGOSCOPY, GASTROSCOPY, DUODENOSCOPY (EGD), COMBINED N/A 5/29/2015    Procedure: COMBINED ESOPHAGOSCOPY, GASTROSCOPY, DUODENOSCOPY (EGD), BIOPSY SINGLE OR MULTIPLE;  Surgeon: John Jacob MD;  Location:  GI    HERNIA REPAIR, UMBILICAL  2006    [facility] NONSPECIFIC PROCEDURE  94    Cholecystectomy    [facility] NONSPECIFIC PROCEDURE  2000    repair deviated septum        Substance Hx:   History   Drug Use Unknown   ,  Social History    Substance and Sexual Activity      Alcohol use: Not Currently        Alcohol/week: 0.0 standard drinks of alcohol  ,   History   Smoking Status    Never   Smokeless Tobacco    Never   ,   History   Sexual Activity    Sexual activity: Not Currently    Partners: Female    Birth control/ protection: Abstinence        Environment/ADLs: independent      Imaging/Labs    Imaging: No results found.       Recent Labs   Lab Test 09/26/23 2131 09/18/23  1629   * 135*   POTASSIUM 4.2 4.1   CHLORIDE 95* 98   CO2 26 27   ANIONGAP 12 10   * 321*   BUN 18.0 17.6   CR 0.94 0.84   LUIS 9.5 9.3     CBC RESULTS:   Recent Labs   Lab Test 09/26/23 2131   WBC 5.5   RBC 4.06*   HGB 13.4   HCT 38.3*   MCV 94   MCH 33.0   MCHC 35.0   RDW 13.5         Liver Function Studies -   Recent Labs   Lab Test 09/26/23 2131   PROTTOTAL 6.6   ALBUMIN 4.1   BILITOTAL 0.4   ALKPHOS 97   AST 24   ALT 36      Troponin I ES   Date Value Ref Range Status   12/06/2020 0.077 (H) 0.000 - 0.045 ug/L Final     Comment:     The 99th percentile for upper reference range is  0.045 ug/L.  Troponin values   in the range of 0.045 - 0.120 ug/L may be associated with risks of adverse   clinical events.          Physical Exam  BP (!) 146/83   Pulse 89   Temp 97.8  F (36.6  C) (Oral)   Resp 18   SpO2 93%     Constitutional: Alert and oriented to person, place and time; no apparent distress.   HEENT: Normocephalic, no scleral icterus. No lymphadema  Abdomen: soft, umbillical hernia, diastasis recti  Lungs: lungs clear to auscultation bilaterally  Heart: Regular s1s2, no evidence of murmurs  Extremities/Neuro:No focal strength/sensation deficits  Skin: LE edema/erthema/with burst blisters. Diffuse in nature, no localisation. Tenderness is present  Psychiatric: appropriate affect, insight and judgment    I have personally spent 77 minutes total time today in preparing to see the patient (eg, review of tests), obtaining and/or reviewing separately obtained history, performing a medically appropriate examination and/or evaluation, counseling and educating the patient/family/caregiver, ordering medications, tests, or procedures, referring and communicating with other health care professionals, documenting clinical information in the electronic or other health record, independently interpreting results and communicating results to the patient/ family/caregiver and care coordination.

## 2023-09-27 NOTE — PROGRESS NOTES
.MD Notification    Notified Person: MD    Notified Person Name: Herman Moore    Notification Date/Time: 0100 9/27/23.    Notification Interaction: Paged.    Purpose of Notification:  Pt is C/o of 8/10 pain. It is too early to give PRN Oxy.  Pt stated he cannot take Advil/Ibuprofen due to a bad liver. Pt is requesting IV Dilaudid. Can you please advise?  Thank you.    Orders Received:  PRN IV Dilaudid 0.5mg every 6hrs.   Scheduled Oxy 5mg every 4hrs.

## 2023-09-27 NOTE — PROGRESS NOTES
Chief Complaint   Patient presents with    Pharyngitis     Sore throat.    Mouth Problem     Sore gums     Leg Swelling     In the hospital for cellulitis, swelling in legs today.              ASSESSMENT:     ICD-10-CM    1. Bilateral lower leg cellulitis  L03.116     L03.115       2. Sore throat  J02.9 CANCELED: Streptococcus A Rapid Screen w/Reflex to PCR - Clinic Collect     CANCELED: Symptomatic COVID-19 Virus (Coronavirus) by PCR Nose            PLAN: Bilateral lower leg cellulitis, circumferential.  Persistent sore throat with negative strep, mono, EBV, COVID 9/18/2023.  My main concern here tonight is the cellulitis.  May need to be admitted.  To ER for further evaluation and treatment.  Patient will go to [facility] All questions are answered, patient indicates understanding of these issues and is in agreement with plan.   Patient care instructions are discussed/given at the end of visit.       Nataly Saini PA-C      SUBJECTIVE:  68-year-old male presents for persistent sore throat now present for 2 weeks.  9/18 had negative strep, mono, EBV, COVID.  Placed on amoxicillin but stopped it a week ago is he felt it caused fecal incontinence.  Also states in the past has been given Keflex for cellulitis of his legs which caused fecal incontinence.  History of cellulitis for which she was hospitalized in the spring.  Now he notes over the past 2 days his legs are swollen and red.      Allergies   Allergen Reactions    Cephalexin Diarrhea    Liraglutide Other (See Comments), Headache and Unknown     Other reaction(s): Headache, Unknown  PN: migraines - Increased migraine frequency and severity      Sulfa Antibiotics Angioedema and Swelling     Pt has taken  Taken sulfa drugs orally without trouble. He had problems with Sulfa eye drops. Eye swelled up         Past Medical History:   Diagnosis Date    Acquired lymphedema 2/4/2019    Allergic state     Amyloidosis (H)     followed by hematology   [place] status post peripheral stem cell transplant    Anxiety 5/11/2016    Anxiety and depression 12/18/2013    Bipolar I disorder (H) 9/1/2015    Under care of psychiatrist [facility]- Dr Rahman doxepin 25 mg, 2 cap bedtime DULoxetine 20 mg once daily  OLANZapine 7.5 mg  1 tab bedtime     DDD (degenerative disc disease), lumbar 8/6/2020    Depressive disorder 1995    EKG abnormality 4/20/2020    H/O bone marrow transplant (H)     Headache(784.0)     History of diverticulitis 1/27/2015    1/15-rxed with antibiotics     Hyperlipidemia LDL goal <100 10/31/2010    Hypertension 2007    Hypertension goal BP (blood pressure) < 140/90 10/11/2011    Marianne (H) 8/20/2015    Morbid obesity (H) 8/28/2018    Myalgia and myositis, unspecified     Narcotic dependence (H) 9/6/2016    None in about 6 months- 8/1/17    NSTEMI (non-ST elevated myocardial infarction) (H) 04/19/2020    OCD (obsessive compulsive disorder)     Other acquired absence of organ 94    Other cirrhosis of liver (H) possibly from vences  4/15/2020    Other specified viral warts     Peripheral edema 5/20/2018    Noncardiac Continue with  furosemide (LASIX) 20 MG tablet,  potassium       chloride SA (K-DUR/KLOR-CON M) 10 MEQ CR  Tab once daily  Basic metabolic panel    Polyneuropathy associated with underlying disease (H) 2/4/2019    Restless legs syndrome (RLS) 6/29/2017    Stasis dermatitis of both legs 12/31/2018    Type 2 diabetes mellitus with other specified complication (H) 7/10/2017       acetaminophen (TYLENOL) 500 MG tablet, Take 1,000 mg by mouth every 8 hours as needed  ammonium lactate (AMLACTIN) 12 % external cream, Apply topically daily To feet and legs daily & as needed  amoxicillin-clavulanate (AUGMENTIN) 875-125 MG tablet, Take 1 tablet by mouth 2 times daily for 10 days  aspirin (ASPIRIN LOW DOSE) 81 MG tablet, Take 1 tablet (81 mg) by mouth daily  atorvastatin (LIPITOR) 20 MG tablet, Take 1.5 tablets (30 mg) by mouth daily  Bioflavonoid Products  (CANDIDO-C) TABS, Take 1,000 mg by mouth daily  blood glucose (NO BRAND SPECIFIED) lancets standard, Use to test blood sugar 1 time daily or as directed.  blood glucose (NO BRAND SPECIFIED) test strip, Use to test blood sugar 1 time daily or as directed.  buPROPion (WELLBUTRIN XL) 150 MG 24 hr tablet, Take 1 tablet (150 mg) by mouth every morning  calcium carbonate (OS-LUIS) 1500 (600 Ca) MG tablet, Take 600 mg by mouth daily  cariprazine (VRAYLAR) 4.5 MG capsule, Take 1 capsule (4.5 mg) by mouth daily  cyclobenzaprine (FLEXERIL) 10 MG tablet, Take 1 tablet (10 mg) by mouth 3 times daily as needed for muscle spasms  DULoxetine (CYMBALTA) 30 MG capsule, Take 1 capsule (30 mg) by mouth 2 times daily  Folic Acid-Vit B6-Vit B12 0.5-5-0.2 MG TABS, Take 1 tablet by mouth daily  glucose 40 % (400 mg/mL) gel, Take 15-30 g by mouth every 15 minutes as needed for low blood sugar (Patient taking differently: Take by mouth every 15 minutes as needed for low blood sugar)  hydrochlorothiazide (HYDRODIURIL) 25 MG tablet, Take 1.5 tablets (37.5 mg) by mouth daily  insulin glargine (LANTUS PEN) 100 UNIT/ML pen, Inject 40 Units Subcutaneous At Bedtime  insulin pen needle (31G X 8 MM) 31G X 8 MM miscellaneous, Use one pen needles daily or as directed.  loperamide (IMODIUM) 2 MG capsule, Take 1 capsule (2 mg) by mouth 4 times daily as needed for diarrhea  losartan (COZAAR) 100 MG tablet, Take 1 tablet (100 mg) by mouth daily  medication given by implanted intrathecal pump, continuous Drug # 1: Fentanyl (Sublimaze) - Conc: 2000 mcg/mL - Total Dose / 24 hours: 1663.3 mcg  Drug # 2: Bupivacaine (Marcaine)  - Conc: 20 mg/mL - Total Dose / 24 hours: 16.633 mg  Drug # 3: Morphine (Duramorph or Infumorph)  - Conc: 9 mg/mL - Total Dose / 24 hours: 7.484 mg    Rate: 0.0325 mL/hr  Pump Reservoir Volume: 40 mL  Pump Reservoir Alarm Volume: 2 ml  Outside Clinic & Provider: [facility]  Last Refill Date: 5/18/2023  Next Refill  "Date: 6/25/2023  Low [place] Alarm Date:  Pump : Medtronic SynchroMed II    Boluses: Up to 3 per day, 3 minute duration. Lock-out every 6 hours  Fentanyl: 99.9 mcg/dose  Bupivacaine: 0.999 mg/dose  Morphine: 0.4497 mg/dose  metFORMIN (GLUCOPHAGE XR) 500 MG 24 hr tablet, Take 2 tablets (1,000 mg) by mouth 2 times daily (with meals)  metoprolol succinate ER (TOPROL XL) 100 MG 24 hr tablet, Take 1 tablet (100 mg) by mouth daily  metroNIDAZOLE (FLAGYL) 500 MG tablet, Crush and sprinkle light layer over right leg, left leg and right toe wounds daily  modafinil (PROVIGIL) 200 MG tablet, TAKE ONE AND ONE-HALF TABLETS BY MOUTH EVERY DAY  multivitamin w/minerals (THERA-VIT-M) tablet, Take 1 tablet by mouth daily Men's 50+  nitroGLYcerin (NITROSTAT) 0.4 MG sublingual tablet, Place 1 tablet (0.4 mg) under the tongue every 5 minutes as needed for chest pain For chest pain place 1 tablet under the tongue every 5 minutes for 3 doses. If symptoms persist 5 minutes after 1st dose call 911.  OVER-THE-COUNTER, Take 3 capsules by mouth daily Supplement for brain health, unknown brand/ingredients  oxyCODONE-acetaminophen (PERCOCET) 5-325 MG tablet, Take 1 tablet by mouth 2 times daily  POTASSIUM PO, Take 1 tablet by mouth daily Unspecified tablet strength; patient estimated \"600 mg, +/- 200 mg\", takes for leg cramps  pregabalin (LYRICA) 100 MG capsule, Take 1 capsule (100 mg) by mouth daily as needed (In addition to 100mg 3 times per day scheduled)  pregabalin (LYRICA) 100 MG capsule, Take 1 capsule (100 mg) by mouth 3 times daily  senna-docusate (SENOKOT-S/PERICOLACE) 8.6-50 MG tablet, Take 1-2 tablets by mouth 2 times daily At baseline, Take 1 tablet twice daily. May take second tablet if needed.  SUMAtriptan (IMITREX) 50 MG tablet, Take 1 tablet (50 mg) by mouth at onset of headache for migraine May repeat in 2 hours. Max 4 tablets/24 hours.  tamsulosin (FLOMAX) 0.4 MG capsule, Take 1 capsule (0.4 mg) by mouth 2 times " daily  testosterone (ANDROGEL/TESTIM) 50 MG/5GM (1%) topical gel, Place 1 packet (50 mg of testosterone) onto the skin daily  vitamin B-Complex, Take 3 tablets by mouth daily  vitamin D3 (CHOLECALCIFEROL) 50 mcg (2000 units) tablet, Take 1 tablet (50 mcg) by mouth daily    No current facility-administered medications on file prior to visit.      Social History     Tobacco Use    Smoking status: Never    Smokeless tobacco: Never   Substance Use Topics    Alcohol use: Not Currently     Alcohol/week: 0.0 standard drinks of alcohol       ROS:  CONSTITUTIONAL: Negative for fatigue or fever.  EYES: Negative for eye problems.  ENT: As above.  RESP: Negative for cough .  CV: Negative for chest pains.  GI: Negative for vomiting.  MUSCULOSKELETAL:  Negative for significant muscle or joint pains.  NEUROLOGIC: Negative for headaches.  SKIN: As above .  PSYCH: Normal mentation for age.    OBJECTIVE:  /70 (BP Location: Left arm, Patient Position: Sitting, Cuff Size: Adult Regular)   Pulse 90   Temp 98.4  F (36.9  C) (Tympanic)   Resp 18   Wt 120.7 kg (266 lb)   SpO2 93%   BMI 42.93 kg/m    GENERAL APPEARANCE: Healthy, alert and no distress.  EYES:Conjunctiva/sclera clear.  THROAT: No erythema w/o tonsillar enlargement . No exudates.  RESP: Lungs clear to auscultation - no rales, rhonchi or wheezes  CV: Regular rate and rhythm, normal S1 S2, no murmur noted.  NEURO: Awake, alert    SKIN: Bright red confluent, swollen lower extremities, circumferential.  Warm to touch.          Nataly Saini PA-C

## 2023-09-27 NOTE — ED NOTES
[facility]  ED Nurse Handoff Report    ED Chief complaint: Wound Check      ED Diagnosis:   Final diagnoses:   Cellulitis of lower extremity, unspecified laterality   Lymphedema       Code Status: To be discussed with inpatient MD    Allergies:   Allergies   Allergen Reactions    Cephalexin Diarrhea    Liraglutide Other (See Comments), Headache and Unknown     Other reaction(s): Headache, Unknown  PN: migraines - Increased migraine frequency and severity      Sulfa Antibiotics Angioedema and Swelling     Pt has taken  Taken sulfa drugs orally without trouble. He had problems with Sulfa eye drops. Eye swelled up         Patient Story: Patient presents to ED with bilat lower leg redness and swelling. New sore on right  lower extremity. Patient also has been having sore throat and tooth pain. HX cellulitis in past.   Focused Assessment:  Bilat legs red and swollen. Sore on right LE. A&Ox4.     Treatments and/or interventions provided: IV, labs, ABX.   Patient's response to treatments and/or interventions: Resting in bed.     To be done/followed up on inpatient unit:  Inpatient orders    Does this patient have any cognitive concerns?:  A&Ox4    Activity level - Baseline/Home:  Independent  Activity Level - Current:   Independent    Patient's Preferred language: English   Needed?: No    Isolation: None  Infection: Not Applicable  Patient tested for COVID 19 prior to admission: NO  Bariatric?: No    Vital Signs:   Vitals:    09/26/23 2116 09/26/23 2215   BP: (!) 162/99 (!) 146/83   Pulse: 89    Resp: 18    Temp: 97.8  F (36.6  C)    TempSrc: Oral    SpO2: 93% 93%       Cardiac Rhythm:     Was the PSS-3 completed:   Yes  What interventions are required if any?               Family Comments: NA  OBS brochure/video discussed/provided to patient/family: N/A              Name of person given brochure if not patient: NA              Relationship to patient: NA    For the majority of the shift this  patient's behavior was Green.   Behavioral interventions performed were NA.    ED NURSE PHONE NUMBER: [id number]

## 2023-09-27 NOTE — ED PROVIDER NOTES
History     Chief Complaint:  Wound Check       The history is provided by the patient.      Parmjit Hernández is a 68 year old male with a history of cellulitis in lower extremities who presents to the ED with swollen, inflamed skin wounds on his right leg. The patient reports that this is the 4th time he has had cellulitis. He states that this time he has 1 wound in his right leg, he had bandaged it but took the bandage off as the wound was not weeping, but ended up in urgent care, and was told to come in to the ED due to the severity of the wound, which was constantly weeping. He states that his right leg has swollen significantly, by approximately 3 inches in circumference compared to his baseline, and his left leg has also swollen although an inch less. He notes that his legs are typically always red, but have been much brighter red with this episode. He also notes some sore throat, pain in his teeth, and pain while swallowing. Erickson mentions that the first time he had cellulitis the pain was more intense and burning, which is not the case with this episode. The patient states that he wears compression socks, which provide some relief from the swelling but not effectively, only at night. He reports that he takes hydrochlorothiazide but was taken off lasix a long time ago for an unknown reason, and is not taking any other diuretics. Denies any fever, chills, or abdominal pain.     Independent Historian:    None - patient only    Review of External Notes:  Chart reviewed discharge summaries    Medications:    Augmentin  Aspirin 81  Lipitor  Wellbutrin  Vraylar  Flexeril  Cymbalata  Hydrodiuril  Insulin  Imodium  Cozaar  Metformin  Metoprolol  Flagyl  Provigil  Nitrostat  Percocet  Lyrica  Senokot  Imitrex  Flomax      Past Medical History:    Acquired lymphedema  Amyloidosis   Anxiety  Anxiety and depression  Bipolar I disorder  Bipolar II Disorder  Cerebral infarction   DDD lumbar  History of  diverticulitis  Hyperlipidemia   Hypertension  Hypertension   Marianne  Migraine   Morbid obesity  Myalgia and myositis, unspecified  Narcotic dependence  NSTEMI   OCD   Other cirrhosis of liver   Pancytopenia   Peripheral edema  Polyneuropathy associated with underlying disease  Polyp of colon  Restless legs syndrome   Stasis dermatitis of both legs  Type 2 diabetes mellitus with other specified complication  Urinary retention    Past Surgical History:    Abdomen surgery  Back surgery  Biopsy  BMT protocol  Bone marrow biopsy x2  Cholecystectomy  Colonoscopy  Combined EGD  Hernia repair, umbilical  Repair deviated septum    Physical Exam   Patient Vitals for the past 24 hrs:   BP Temp Temp src Pulse Resp SpO2   09/26/23 2215 (!) 146/83 -- -- -- -- 93 %   09/26/23 2116 (!) 162/99 97.8  F (36.6  C) Oral 89 18 93 %        Physical Exam    GENERAL: well developed, pleasant  HEAD: atraumatic  EYES: pupils reactive, extraocular muscles intact, conjunctivae normal  ENT:  mucus membranes moist  NECK:  trachea midline, normal range of motion  RESPIRATORY: no tachypnea, breath sounds clear to auscultation   CVS: normal S1/S2, no murmurs, intact distal pulses  ABDOMEN: soft, nontender, nondistention  MUSCULOSKELETAL: no deformities  SKIN: Erythema to one with significant redness and edema to both lower extremities  NEURO: GCS 15, cranial nerves intact, alert and oriented x3  PSYCH:  Mood/affect normal      Emergency Department Course     Laboratory:  Labs Ordered and Resulted from Time of ED Arrival to Time of ED Departure   COMPREHENSIVE METABOLIC PANEL - Abnormal       Result Value    Sodium 133 (*)     Potassium 4.2      Carbon Dioxide (CO2) 26      Anion Gap 12      Urea Nitrogen 18.0      Creatinine 0.94      GFR Estimate 89      Calcium 9.5      Chloride 95 (*)     Glucose 169 (*)     Alkaline Phosphatase 97      AST 24      ALT 36      Protein Total 6.6      Albumin 4.1      Bilirubin Total 0.4     PROCALCITONIN -  Abnormal    Procalcitonin 0.21 (*)    CBC WITH PLATELETS AND DIFFERENTIAL - Abnormal    WBC Count 5.5      RBC Count 4.06 (*)     Hemoglobin 13.4      Hematocrit 38.3 (*)     MCV 94      MCH 33.0      MCHC 35.0      RDW 13.5      Platelet Count 167      % Neutrophils 70      % Lymphocytes 16      % Monocytes 8      % Eosinophils 4      % Basophils 1      % Immature Granulocytes 1      NRBCs per 100 WBC 0      Absolute Neutrophils 3.9      Absolute Lymphocytes 0.9      Absolute Monocytes 0.4      Absolute Eosinophils 0.2      Absolute Basophils 0.1      Absolute Immature Granulocytes 0.0      Absolute NRBCs 0.0     ISTAT GASES LACTATE VENOUS POCT - Abnormal    Lactic Acid POCT 0.7      Bicarbonate Venous POCT 32 (*)     O2 Sat, Venous POCT 87 (*)     pCO2 Venous POCT 48      pH Venous POCT 7.42      pO2 Venous POCT 53 (*)    LACTIC ACID WHOLE BLOOD - Normal    Lactic Acid 1.1     ROUTINE UA WITH MICROSCOPIC - Normal    Color Urine Light Yellow      Appearance Urine Clear      Glucose Urine Negative      Bilirubin Urine Negative      Ketones Urine Negative      Specific Gravity Urine 1.010      Blood Urine Negative      pH Urine 5.0      Protein Albumin Urine Negative      Urobilinogen Urine Normal      Nitrite Urine Negative      Leukocyte Esterase Urine Negative      RBC Urine <1      WBC Urine 0      Squamous Epithelials Urine <1     INFLUENZA A/B, RSV, & SARS-COV2 PCR - Normal    Influenza A PCR Negative      Influenza B PCR Negative      RSV PCR Negative      SARS CoV2 PCR Negative     BLOOD CULTURE   BLOOD CULTURE   GROUP A STREPTOCOCCUS PCR THROAT SWAB      Emergency Department Course & Assessments:    Interventions:  Medications   sodium chloride (PF) 0.9% PF flush 3 mL (has no administration in time range)   sodium chloride (PF) 0.9% PF flush 3 mL (has no administration in time range)   ceFAZolin (ANCEF) 2 g in 100 mL D5W intermittent infusion (has no administration in time range)        Assessments:  2220 I  obtained history and examined the patient as noted above.;    Independent Interpretation (X-rays, CTs, rhythm strip):  None    Consultations/Discussion of Management or Tests:  2247 I spoke with Dr. Liu of the hospitalist team regarding the patient, who accepted the patient for admission.    Social Determinants of Health affecting care:  N/A     Disposition:  The patient was admitted to the hospital under the care of Dr. Liu.     Impression & Plan    CMS Diagnoses: None    Medical Decision Making:  Patient presents with edema and swelling to his lower extremities and increased redness and pain.  Patient measures the circumference on a regular basis and notes about 4 to 5 cm increase in circumference.  Patient notes that he is clinic redness but its more red and more painful.  He is afebrile.  White count is slightly elevated as is the procalcitonin.  Discussed with him likely the edema is largely the culprit.  Patient was given dose of IV antibiotics with the hospitalist regarding admission.    Critical Care time:  was 0 minutes for this patient excluding procedures.    Diagnosis:    ICD-10-CM    1. Cellulitis of lower extremity, unspecified laterality  L03.119       2. Lymphedema  I89.0              Scribe Disclosure:  Segundo SPRINGER, am serving as a scribe at 10:54 PM on 9/26/2023 to document services personally performed by Justo Mae MD, based on my observations and the provider's statements to me.  9/26/2023   Justo Mae MD Adams, Shaun L, MD  09/27/23 0054

## 2023-09-27 NOTE — PROGRESS NOTES
09/27/23 1000   Appointment Info   Signing Clinician's Name / Credentials (PT) Ifeoma Younger PT   Living Environment   People in Home alone   Current Living Arrangements apartment   Home Accessibility no concerns   Transportation Anticipated car, drives self   Self-Care   Usual Activity Tolerance good   Current Activity Tolerance good   Regular Exercise No   Equipment Currently Used at Home none   Fall history within last six months no   General Information   Onset of Illness/Injury or Date of Surgery 09/26/23   Referring Physician Dr. Liu   Patient/Family Therapy Goals Statement (PT) To go home   Pertinent History of Current Problem (include personal factors and/or comorbidities that impact the POC) Pt is a 68 year old male with history of lymphedema admitted for cellulitis   Cognition   Affect/Mental Status (Cognition) WFL   Orientation Status (Cognition) oriented x 4   Pain Assessment   Patient Currently in Pain   (Chronic pain throughout all joints)   Range of Motion (ROM)   Range of Motion ROM is WFL   Strength (Manual Muscle Testing)   Strength (Manual Muscle Testing) strength is WFL   Bed Mobility   Bed Mobility no deficits identified   Transfers   Transfers no deficits identified   Gait/Stairs (Locomotion)   Comment, (Gait/Stairs) Pt ambulates >250' without an AD independently. Pt demonstrates ability to start/stop, change speeds and change directions without difficulty.   Balance   Balance Comments Good   Clinical Impression   Criteria for Skilled Therapeutic Intervention Evaluation only   Clinical Presentation (PT Evaluation Complexity) Stable/Uncomplicated   Clinical Presentation Rationale VSS, pain controlled   Clinical Decision Making (Complexity) low complexity   Risk & Benefits of therapy have been explained evaluation/treatment results reviewed;care plan/treatment goals reviewed;risks/benefits reviewed;current/potential barriers reviewed;participants voiced agreement with care  plan;participants included;patient   PT Total Evaluation Time   PT Bebeal, Low Complexity Minutes (23941) 15   PT Discharge Planning   PT Plan Independent with mobility, no acute care PT needs   PT Discharge Recommendation (DC Rec) home   PT Rationale for DC Rec Pt independent with mobility, safe to return to home environment.   PT Brief overview of current status Independent   Total Session Time   Total Session Time (sum of timed and untimed services) 15

## 2023-09-28 ENCOUNTER — PATIENT OUTREACH (OUTPATIENT)
Dept: CARE COORDINATION | Facility: CLINIC | Age: 68
End: 2023-09-28

## 2023-09-28 NOTE — LETTER
[facility] CARE COORDINATION  [address]  [facility] [address]    October 4, 2023    Parmjit Hernández  [address]  [facility] [address]      Dear Parmjit,    I am a  clinic care coordinator who works with Sherwin Mcdermott, DO with the [facility]. I have been trying to reach you recently to introduce Clinic Care Coordination. Below is a description of clinic care coordination and how I can further assist you.       The clinic care coordination team is made up of a registered nurse, , financial resource worker and community health worker who understand the health care system. The goal of clinic care coordination is to help you manage your health and improve access to the health care system. Our team works alongside your provider to assist you in determining your health and social needs. We can help you obtain health care and community resources, providing you with necessary information and education. We can work with you through any barriers and develop a care plan that helps coordinate and strengthen the communication between you and your care team.  Our services are voluntary and are offered without charge to you personally.    Please feel free to contact me with any questions or concerns regarding care coordination and what we can offer.      We are focused on providing you with the highest-quality healthcare experience possible.    Sincerely,     Melvina Cline, PRINCESSN, RN, PHN   Care Coordinator-[facility] and [facility]  [phone]

## 2023-09-28 NOTE — PROGRESS NOTES
Clinic Care Coordination Contact  [facility]/Voicemail       Clinical Data: Care Coordinator Outreach  Outreach attempted x 1.  Left message on patient's voicemail with call back information and requested return call.  Plan: Care Coordinator will try to reach patient again in 1-2 business days.    PRINCESS RochaN, RN, PHN   Care Coordinator-[facility] and [facility]  [phone]       Anticipated Starting Dosage (Optional): 40mg Daily Detail Level: Zone

## 2023-10-01 ENCOUNTER — MYC MEDICAL ADVICE (OUTPATIENT)
Dept: FAMILY MEDICINE | Facility: CLINIC | Age: 68
End: 2023-10-01
Payer: COMMERCIAL

## 2023-10-01 DIAGNOSIS — E11.69 TYPE 2 DIABETES MELLITUS WITH OTHER SPECIFIED COMPLICATION, WITHOUT LONG-TERM CURRENT USE OF INSULIN (H): Primary | ICD-10-CM

## 2023-10-01 DIAGNOSIS — G47.33 OBSTRUCTIVE SLEEP APNEA SYNDROME: ICD-10-CM

## 2023-10-02 LAB
BACTERIA BLD CULT: NO GROWTH
BACTERIA BLD CULT: NO GROWTH

## 2023-10-02 NOTE — TELEPHONE ENCOUNTER
DE,  Please see below Bonanzahart message and advise.  Pended order? Not sure about dose  Thanks,  Aggie CAZARES RN

## 2023-10-03 RX ORDER — MODAFINIL 200 MG/1
400 TABLET ORAL DAILY
Qty: 60 TABLET | Refills: 2 | Status: SHIPPED | OUTPATIENT
Start: 2023-10-03 | End: 2024-01-29

## 2023-10-04 ENCOUNTER — ONCOLOGY VISIT (OUTPATIENT)
Dept: TRANSPLANT | Facility: CLINIC | Age: 68
End: 2023-10-04
Attending: STUDENT IN AN ORGANIZED HEALTH CARE EDUCATION/TRAINING PROGRAM
Payer: COMMERCIAL

## 2023-10-04 ENCOUNTER — APPOINTMENT (OUTPATIENT)
Dept: LAB | Facility: CLINIC | Age: 68
End: 2023-10-04
Attending: STUDENT IN AN ORGANIZED HEALTH CARE EDUCATION/TRAINING PROGRAM
Payer: COMMERCIAL

## 2023-10-04 VITALS
TEMPERATURE: 98.8 F | RESPIRATION RATE: 16 BRPM | HEART RATE: 88 BPM | BODY MASS INDEX: 41.71 KG/M2 | OXYGEN SATURATION: 93 % | SYSTOLIC BLOOD PRESSURE: 143 MMHG | DIASTOLIC BLOOD PRESSURE: 93 MMHG | WEIGHT: 258.4 LBS

## 2023-10-04 DIAGNOSIS — Z23 HIGH PRIORITY FOR 2019-NCOV VACCINE: Primary | ICD-10-CM

## 2023-10-04 DIAGNOSIS — D61.818 ACQUIRED PANCYTOPENIA (H): Primary | ICD-10-CM

## 2023-10-04 DIAGNOSIS — E85.81 LIGHT CHAIN (AL) AMYLOIDOSIS (H): ICD-10-CM

## 2023-10-04 LAB
ALBUMIN SERPL BCG-MCNC: 4.3 G/DL (ref 3.5–5.2)
ALP SERPL-CCNC: 107 U/L (ref 40–129)
ALT SERPL W P-5'-P-CCNC: 45 U/L (ref 0–70)
ANION GAP SERPL CALCULATED.3IONS-SCNC: 11 MMOL/L (ref 7–15)
AST SERPL W P-5'-P-CCNC: 29 U/L (ref 0–45)
BASO+EOS+MONOS # BLD AUTO: ABNORMAL 10*3/UL
BASO+EOS+MONOS NFR BLD AUTO: ABNORMAL %
BASOPHILS # BLD AUTO: 0.1 10E3/UL (ref 0–0.2)
BASOPHILS NFR BLD AUTO: 1 %
BILIRUB SERPL-MCNC: 0.5 MG/DL
BUN SERPL-MCNC: 11.7 MG/DL (ref 8–23)
CALCIUM SERPL-MCNC: 9.4 MG/DL (ref 8.8–10.2)
CHLORIDE SERPL-SCNC: 96 MMOL/L (ref 98–107)
CREAT SERPL-MCNC: 0.79 MG/DL (ref 0.67–1.17)
DEPRECATED HCO3 PLAS-SCNC: 26 MMOL/L (ref 22–29)
EGFRCR SERPLBLD CKD-EPI 2021: >90 ML/MIN/1.73M2
EOSINOPHIL # BLD AUTO: 0.2 10E3/UL (ref 0–0.7)
EOSINOPHIL NFR BLD AUTO: 3 %
ERYTHROCYTE [DISTWIDTH] IN BLOOD BY AUTOMATED COUNT: 13.3 % (ref 10–15)
GLUCOSE SERPL-MCNC: 229 MG/DL (ref 70–99)
HCT VFR BLD AUTO: 38.4 % (ref 40–53)
HGB BLD-MCNC: 14 G/DL (ref 13.3–17.7)
IMM GRANULOCYTES # BLD: 0.1 10E3/UL
IMM GRANULOCYTES NFR BLD: 1 %
LYMPHOCYTES # BLD AUTO: 0.8 10E3/UL (ref 0.8–5.3)
LYMPHOCYTES NFR BLD AUTO: 12 %
MCH RBC QN AUTO: 33.7 PG (ref 26.5–33)
MCHC RBC AUTO-ENTMCNC: 36.5 G/DL (ref 31.5–36.5)
MCV RBC AUTO: 92 FL (ref 78–100)
MONOCYTES # BLD AUTO: 0.4 10E3/UL (ref 0–1.3)
MONOCYTES NFR BLD AUTO: 6 %
NEUTROPHILS # BLD AUTO: 5.1 10E3/UL (ref 1.6–8.3)
NEUTROPHILS NFR BLD AUTO: 77 %
NRBC # BLD AUTO: 0 10E3/UL
NRBC BLD AUTO-RTO: 0 /100
NT-PROBNP SERPL-MCNC: 285 PG/ML (ref 0–900)
PLATELET # BLD AUTO: 178 10E3/UL (ref 150–450)
POTASSIUM SERPL-SCNC: 4.1 MMOL/L (ref 3.4–5.3)
PROT SERPL-MCNC: 6.6 G/DL (ref 6.4–8.3)
RBC # BLD AUTO: 4.16 10E6/UL (ref 4.4–5.9)
SODIUM SERPL-SCNC: 133 MMOL/L (ref 135–145)
WBC # BLD AUTO: 6.6 10E3/UL (ref 4–11)

## 2023-10-04 PROCEDURE — G0008 ADMIN INFLUENZA VIRUS VAC: HCPCS | Performed by: STUDENT IN AN ORGANIZED HEALTH CARE EDUCATION/TRAINING PROGRAM

## 2023-10-04 PROCEDURE — 90662 IIV NO PRSV INCREASED AG IM: CPT | Performed by: STUDENT IN AN ORGANIZED HEALTH CARE EDUCATION/TRAINING PROGRAM

## 2023-10-04 PROCEDURE — 90480 ADMN SARSCOV2 VAC 1/ONLY CMP: CPT | Performed by: STUDENT IN AN ORGANIZED HEALTH CARE EDUCATION/TRAINING PROGRAM

## 2023-10-04 PROCEDURE — 86334 IMMUNOFIX E-PHORESIS SERUM: CPT | Mod: 26

## 2023-10-04 PROCEDURE — 82784 ASSAY IGA/IGD/IGG/IGM EACH: CPT

## 2023-10-04 PROCEDURE — 250N000011 HC RX IP 250 OP 636: Performed by: STUDENT IN AN ORGANIZED HEALTH CARE EDUCATION/TRAINING PROGRAM

## 2023-10-04 PROCEDURE — 99207 PR NO CHARGE LOS: CPT | Performed by: STUDENT IN AN ORGANIZED HEALTH CARE EDUCATION/TRAINING PROGRAM

## 2023-10-04 PROCEDURE — 84165 PROTEIN E-PHORESIS SERUM: CPT | Mod: 26

## 2023-10-04 PROCEDURE — 86334 IMMUNOFIX E-PHORESIS SERUM: CPT | Performed by: PATHOLOGY

## 2023-10-04 PROCEDURE — 84155 ASSAY OF PROTEIN SERUM: CPT

## 2023-10-04 PROCEDURE — 80051 ELECTROLYTE PANEL: CPT

## 2023-10-04 PROCEDURE — 99214 OFFICE O/P EST MOD 30 MIN: CPT

## 2023-10-04 PROCEDURE — 84165 PROTEIN E-PHORESIS SERUM: CPT | Mod: TC | Performed by: PATHOLOGY

## 2023-10-04 PROCEDURE — 91320 SARSCV2 VAC 30MCG TRS-SUC IM: CPT | Performed by: STUDENT IN AN ORGANIZED HEALTH CARE EDUCATION/TRAINING PROGRAM

## 2023-10-04 PROCEDURE — 36415 COLL VENOUS BLD VENIPUNCTURE: CPT

## 2023-10-04 PROCEDURE — G0463 HOSPITAL OUTPT CLINIC VISIT: HCPCS | Mod: 25

## 2023-10-04 PROCEDURE — 85025 COMPLETE CBC W/AUTO DIFF WBC: CPT

## 2023-10-04 PROCEDURE — 83880 ASSAY OF NATRIURETIC PEPTIDE: CPT

## 2023-10-04 PROCEDURE — 83521 IG LIGHT CHAINS FREE EACH: CPT | Mod: 91

## 2023-10-04 RX ORDER — EPINEPHRINE 1 MG/ML
0.3 INJECTION, SOLUTION INTRAMUSCULAR; SUBCUTANEOUS EVERY 5 MIN PRN
Status: DISCONTINUED | OUTPATIENT
Start: 2023-10-04 | End: 2023-10-04 | Stop reason: HOSPADM

## 2023-10-04 RX ORDER — DIPHENHYDRAMINE HYDROCHLORIDE 50 MG/ML
50 INJECTION INTRAMUSCULAR; INTRAVENOUS
Status: DISCONTINUED | OUTPATIENT
Start: 2023-10-04 | End: 2023-10-04 | Stop reason: HOSPADM

## 2023-10-04 RX ORDER — DIPHENHYDRAMINE HCL 25 MG
50 CAPSULE ORAL
Status: DISCONTINUED | OUTPATIENT
Start: 2023-10-04 | End: 2023-10-04 | Stop reason: HOSPADM

## 2023-10-04 RX ADMIN — INFLUENZA A VIRUS A/VICTORIA/4897/2022 IVR-238 (H1N1) ANTIGEN (FORMALDEHYDE INACTIVATED), INFLUENZA A VIRUS A/DARWIN/9/2021 SAN-010 (H3N2) ANTIGEN (FORMALDEHYDE INACTIVATED), INFLUENZA B VIRUS B/PHUKET/3073/2013 ANTIGEN (FORMALDEHYDE INACTIVATED), AND INFLUENZA B VIRUS B/MICHIGAN/01/2021 ANTIGEN (FORMALDEHYDE INACTIVATED) 0.7 ML: 60; 60; 60; 60 INJECTION, SUSPENSION INTRAMUSCULAR at 15:48

## 2023-10-04 RX ADMIN — COVID-19 VACCINE, MRNA 30 MCG: 0.05 INJECTION, SUSPENSION INTRAMUSCULAR at 15:46

## 2023-10-04 ASSESSMENT — PAIN SCALES - GENERAL: PAINLEVEL: NO PAIN (0)

## 2023-10-04 NOTE — PROGRESS NOTES
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      Disease presentation and baseline characteristics:   Per [facility] Records:  \"1. May 2015, patient developed increasing right upper quadrant pain. Endoscopy performed in 2015 was reportedly unremarkable. An enlarged lymph node was seen, but biopsy was negative in 2015. He subsequently developed increasing pain in later in 2015 which required multiple visits to an Emergency Room for further management.  2. September 2015, patient was seen in [facility] ED where he received an ultrasound of the abdomen that demonstrated surgically absent gallbladder with a slightly enlarged pancreas. There were mildly enlarged peripancreatic lymph nodes measuring 3.4 x 1.3 cm in size which were thought to be indeterminate.   3. April 29, 2016, patient underwent an endoscopic EUS that demonstrated modest enlargement of lymph nodes. Reportedly the biopsy was consistent with extracellular material; however, positive for amyloid stains. No monoclonal lymphocytes were seen in other specimens. Testing at [facility] reportedly demonstrated AL kappa amyloidosis subtype in the lymph nodes.  4. June 6, 2016, skeletal survey demonstrated a 1.4 cm focal lytic lesion in the right radial diaphysis and possible diffuse tiny subcentimeter lytic lesions throughout the calvarium. Blood work that time demonstrated a reduced IgG (276), IgM (12), and IgA (44). No monoclonal protein or free light chain was seen in the serum or urine.  5. June 8, 2016, bone marrow biopsy demonstrated normocellular bone marrow with 50% cellularity. Plasma cells were 5% with 4% kappa monotypic features on flow cytometry. The vessel walls were noted to be thick with positive Congo staining consistent with primary amyloidosis. Chromosomes demonstrated a normal 46 XY in 20 (out of 20) metaphases. FISH however demonstrated t(11;14) along with a monosomy 13 consistent with a plasma cell " "neoplasm.   6. June 23, 2016: He presented to [facility] for a second opinion on the suspected AL amyloidosis. The PET/CT from June 27, 2016 was negative for lytic lesions. Congo red stain was performed on the biopsies from the subcarinal lymph nodes, which were positive for amyloid deposition. Mass spectrometry revealed the specimen to be positive for AL (kappa) type amyloid. There was no definitive evidence for cardiac involvement.\"    Date Treatment Name Response Side Effects / Toxicities   8/1/2016 CyBorD VGPR    12/27/2016 HDT/ASCT CR           HPI:  Please see my entry above for hematologic history.  Mr. Hernández presents today for follow up.  He missed his lab appointment so restaging studies are unfortunately not available today but are currently pending.  Mr. Hernández notes that in general he is doing relatively well.  He did go into the hospital recently with concern for cellulitis, but was found to have stasis dermatitis.  He has not noted any new concerns since his hospital discharge aside from stress related to work.  He is planning to see his PCP about his DM management next week.    ASSESSMENT AND PLAN:  No evidence of clinical relapse today and recent CBC/CMP was not concerning.  Echo obtained after last visit was unremarkable.  Labs from today are pending and will likely be available early next week; I will reach out to Mr. Hernández to discuss them.    We discussed the management of relapsed AL amyloidosis if/when it becomes necessary.  He does have 5.19 x 10^6 CD45 cells/kg in storage at [facility].      Plan:   - Follow up amyloid restaging labs in process    Follow up in 6 months unless concerns on restaging labs    I spent 30 minutes in the care of this patient today, which included time necessary for preparation for the visit, obtaining history, ordering medications/tests/procedures as medically indicated, review of pertinent medical literature, counseling of the patient, communication of recommendations to " the care team, and documentation time.    Charly Hester MD    ROS:    10 point ROS neg other than the symptoms noted above in the HPI.      Past Medical History:   Diagnosis Date    Acquired lymphedema 2/4/2019    Allergic state     Amyloidosis (H)     followed by hematology Dr. Romeo status post peripheral stem cell transplant    Anxiety 5/11/2016    Anxiety and depression 12/18/2013    Bipolar I disorder (H) 9/1/2015    Under care of psychiatrist [facility]- Dr Rahman doxepin 25 mg, 2 cap bedtime DULoxetine 20 mg once daily  OLANZapine 7.5 mg  1 tab bedtime     DDD (degenerative disc disease), lumbar 8/6/2020    Depressive disorder 1995    EKG abnormality 4/20/2020    H/O bone marrow transplant (H)     Headache(784.0)     History of diverticulitis 1/27/2015    1/15-rxed with antibiotics     Hyperlipidemia LDL goal <100 10/31/2010    Hypertension 2007    Hypertension goal BP (blood pressure) < 140/90 10/11/2011    Marianne (H) 8/20/2015    Morbid obesity (H) 8/28/2018    Myalgia and myositis, unspecified     Narcotic dependence (H) 9/6/2016    None in about 6 months- 8/1/17    NSTEMI (non-ST elevated myocardial infarction) (H) 04/19/2020    OCD (obsessive compulsive disorder)     Other acquired absence of organ 94    Other cirrhosis of liver (H) possibly from vences  4/15/2020    Other specified viral warts     Peripheral edema 5/20/2018    Noncardiac Continue with  furosemide (LASIX) 20 MG tablet,  potassium       chloride SA (K-DUR/KLOR-CON M) 10 MEQ CR  Tab once daily  Basic metabolic panel    Polyneuropathy associated with underlying disease (H) 2/4/2019    Restless legs syndrome (RLS) 6/29/2017    Stasis dermatitis of both legs 12/31/2018    Type 2 diabetes mellitus with other specified complication (H) 7/10/2017       Past Surgical History:   Procedure Laterality Date    ABDOMEN SURGERY  2007    abdominal hernia    BACK SURGERY  8/6/2020    BIOPSY  june 2015    negative    BMT PROTOCOL      for systemic  amyloidosis    BONE MARROW BIOPSY, BONE SPECIMEN, NEEDLE/TROCAR N/A 6/8/2016    Procedure: BIOPSY BONE MARROW;  Surgeon: Nathan Agrawal MD;  Location:  GI    BONE MARROW BIOPSY, BONE SPECIMEN, NEEDLE/TROCAR N/A 2/20/2019    Procedure: BIOPSY BONE MARROW;  Surgeon: Michael Raygoza MD;  Location:  GI    CHOLECYSTECTOMY  1995    lap qian    COLONOSCOPY  2012    hx polyps    ESOPHAGOSCOPY, GASTROSCOPY, DUODENOSCOPY (EGD), COMBINED N/A 5/29/2015    Procedure: COMBINED ESOPHAGOSCOPY, GASTROSCOPY, DUODENOSCOPY (EGD), BIOPSY SINGLE OR MULTIPLE;  Surgeon: John Jacob MD;  Location:  GI    HERNIA REPAIR, UMBILICAL  2006    ZZ NONSPECIFIC PROCEDURE  94    Cholecystectomy    [facility] NONSPECIFIC PROCEDURE  2000    repair deviated septum       Family History   Problem Relation Age of Onset    Cerebrovascular Disease Mother     C.A.D. Mother     Hypertension Mother     Alcohol/Drug Mother     Arthritis Mother     Cerebrovascular Disease Maternal Grandmother     C.A.D. Maternal Grandmother     Alcohol/Drug Maternal Grandmother     C.A.D. Father     Heart Disease Father     Hypertension Father     Alcohol/Drug Father     Allergies Father     Circulatory Father     Depression Father     Respiratory Father     Asthma Father     Alcohol/Drug Paternal Grandfather     Cerebrovascular Disease Paternal Grandfather     Depression Son     Psychotic Disorder Son         anxiety disorder    Depression Daughter     Cerebrovascular Disease Maternal Grandfather     Cerebrovascular Disease Paternal Grandmother     Diabetes Brother     Allergies Sister     Depression Sister     Gynecology Sister        Social History     Tobacco Use    Smoking status: Never    Smokeless tobacco: Never   Vaping Use    Vaping Use: Never used   Substance Use Topics    Alcohol use: Not Currently     Alcohol/week: 0.0 standard drinks of alcohol    Drug use: Not Currently     Types: Cocaine, Marijuana, Methamphetamines, Opiates, IV,  Amphetamines, Barbiturates, Benzodiazepines, Codeine, Fentanyl, Hashish, Hydrocodone, Hydromorphone, LSD, Oxycodone         Allergies   Allergen Reactions    Amoxicillin Diarrhea    Cephalexin Diarrhea    Liraglutide Other (See Comments), Headache and Unknown     Other reaction(s): Headache, Unknown  PN: migraines - Increased migraine frequency and severity      Sulfa Antibiotics Angioedema and Swelling     Pt has taken  Taken sulfa drugs orally without trouble. He had problems with Sulfa eye drops. Eye swelled up          Current Outpatient Medications   Medication Sig Dispense Refill    acetaminophen (TYLENOL) 500 MG tablet Take 1,000 mg by mouth every 8 hours as needed      ammonium lactate (AMLACTIN) 12 % external cream Apply topically daily To feet and legs daily & as needed 385 g 1    aspirin (ASPIRIN LOW DOSE) 81 MG tablet Take 1 tablet (81 mg) by mouth daily 30 tablet 3    atorvastatin (LIPITOR) 20 MG tablet Take 1.5 tablets (30 mg) by mouth daily 135 tablet 3    Bioflavonoid Products (CANDIDO-C) TABS Take 1,000 mg by mouth daily 90 tablet 3    blood glucose (NO BRAND SPECIFIED) lancets standard Use to test blood sugar 1 time daily or as directed. 100 Lancet 4    blood glucose (NO BRAND SPECIFIED) test strip Use to test blood sugar 1 time daily or as directed. 200 strip 1    buPROPion (WELLBUTRIN XL) 150 MG 24 hr tablet Take 1 tablet (150 mg) by mouth every morning 90 tablet 3    calcium carbonate (OS-LUIS) 1500 (600 Ca) MG tablet Take 600 mg by mouth daily      cariprazine (VRAYLAR) 4.5 MG capsule Take 1 capsule (4.5 mg) by mouth daily 90 capsule 3    clobetasol (TEMOVATE) 0.05 % external cream Apply topically 2 times daily For psoriasis on lower body      cyclobenzaprine (FLEXERIL) 10 MG tablet Take 1 tablet (10 mg) by mouth 3 times daily as needed for muscle spasms 60 tablet 3    DULoxetine (CYMBALTA) 30 MG capsule Take 1 capsule (30 mg) by mouth 2 times daily 180 capsule 1    Folic Acid-Vit B6-Vit B12  0.5-5-0.2 MG TABS Take 1 tablet by mouth daily 90 tablet 3    furosemide (LASIX) 40 MG tablet Take 1 tablet (40 mg) by mouth daily for 7 days 7 tablet 0    glucose 40 % (400 mg/mL) gel Take by mouth every 15 minutes as needed for low blood sugar      insulin glargine (LANTUS PEN) 100 UNIT/ML pen Inject 40 Units Subcutaneous At Bedtime 15 mL 5    insulin pen needle (31G X 8 MM) 31G X 8 MM miscellaneous Use one pen needles daily or as directed. 100 each 1    loperamide (IMODIUM) 2 MG capsule Take 1 capsule (2 mg) by mouth 4 times daily as needed for diarrhea      losartan (COZAAR) 100 MG tablet Take 1 tablet (100 mg) by mouth daily 90 tablet 3    medication given by implanted intrathecal pump continuous Drug # 1: Fentanyl (Sublimaze) - Conc: 2000 mcg/mL - Total Dose / 24 hours: 1663.3 mcg  Drug # 2: Bupivacaine (Marcaine)  - Conc: 20 mg/mL - Total Dose / 24 hours: 16.633 mg  Drug # 3: Morphine (Duramorph or Infumorph)  - Conc: 9 mg/mL - Total Dose / 24 hours: 7.484 mg    Rate: 0.0325 mL/hr  Pump Reservoir Volume: 40 mL  Pump Reservoir Alarm Volume: 2 ml  Outside Clinic & Provider: Dr. Pawan Shaw, [facility]  Last Refill Date: 5/18/2023  Next Refill Date: 6/25/2023  [place] Alarm Date:  Pump : Medtronic SynchroMed II    Boluses: Up to 3 per day, 4 minute duration. Lock-out every 6 hours  Fentanyl: 110 mcg/dose  Bupivacaine: 1.1 mg/dose  Morphine: 0.49 mg/dose      metFORMIN (GLUCOPHAGE XR) 500 MG 24 hr tablet Take 2 tablets (1,000 mg) by mouth 2 times daily (with meals) 360 tablet 3    metoprolol succinate ER (TOPROL XL) 100 MG 24 hr tablet Take 1 tablet (100 mg) by mouth daily 90 tablet 3    metroNIDAZOLE (FLAGYL) 500 MG tablet Crush and sprinkle light layer over right leg, left leg and right toe wounds daily 60 tablet 0    modafinil (PROVIGIL) 200 MG tablet Take 2 tablets (400 mg) by mouth daily Pt has increased his dose to 400mg daily from 300mg daily.  Has a message in to his physician  for a dose increase. 60 tablet 2    mometasone (ELOCON) 0.1 % external cream Apply topically daily For psoriasis on face      multivitamin w/minerals (THERA-VIT-M) tablet Take 1 tablet by mouth daily Men's 50+      nitroGLYcerin (NITROSTAT) 0.4 MG sublingual tablet Place 1 tablet (0.4 mg) under the tongue every 5 minutes as needed for chest pain For chest pain place 1 tablet under the tongue every 5 minutes for 3 doses. If symptoms persist 5 minutes after 1st dose call 911. 30 tablet 1    OVER-THE-COUNTER Take 3 capsules by mouth daily Supplement for brain health, unknown brand/ingredients      oxyCODONE-acetaminophen (PERCOCET) 5-325 MG tablet Take 1 tablet by mouth 2 times daily as needed for pain      POTASSIUM PO Take 1 tablet by mouth daily Unspecified tablet strength; patient estimated 600 mg, takes for leg cramps      pregabalin (LYRICA) 100 MG capsule Take 1 capsule (100 mg) by mouth daily as needed (In addition to 100mg 3 times per day scheduled) 30 capsule 2    pregabalin (LYRICA) 100 MG capsule Take 1 capsule (100 mg) by mouth 3 times daily 30 capsule 2    senna-docusate (SENOKOT-S/PERICOLACE) 8.6-50 MG tablet Take 1-2 tablets by mouth 2 times daily as needed      SUMAtriptan (IMITREX) 50 MG tablet Take 1 tablet (50 mg) by mouth at onset of headache for migraine May repeat in 2 hours. Max 4 tablets/24 hours. 8 tablet 1    tamsulosin (FLOMAX) 0.4 MG capsule Take 1 capsule (0.4 mg) by mouth 2 times daily 180 capsule 3    testosterone (ANDROGEL/TESTIM) 50 MG/5GM (1%) topical gel Place 1 packet (50 mg of testosterone) onto the skin daily 30 packet 1    vitamin B-Complex Take 3 tablets by mouth daily      vitamin D3 (CHOLECALCIFEROL) 50 mcg (2000 units) tablet Take 1 tablet (50 mcg) by mouth daily 90 tablet 3         Physical Exam:     Vital Signs: BP (!) 143/93   Pulse 88   Temp 98.8  F (37.1  C) (Oral)   Resp 16   Wt 117.2 kg (258 lb 6.4 oz)   SpO2 93%   BMI 41.71 kg/m      ECO  General  Appearance: alert, and no distress  Eyes: PERRL, conjunctiva and lids normal, sclera nonicteric  Ears/Nose/M/Throat: Oral mucosa and posterior oropharynx normal, moist mucous membranes  Neck supple, non-tender, free range of motion, no adenopathy  Cardio/Vascular:regular rate and rhythm, normal S1 and S2, no murmur  Resp Effort And Auscultation: Normal - Clear to auscultation without rales, rhonchi, or wheezing.  GI: soft, nontender, bowel sounds present in all four quadrants, no hepatosplenomegaly  Lymphatics:no significant enlargement of lymph nodes globally   Musculoskeletal: Musculoskeletal normal  Edema: none  Skin: Skin color, texture, turgor normal. No rashes or lesions in visualized areas; patient declined to remove clothes for examination of recent RLE cellulitis  Neurologic: Gait normal.  Sensation grossly WNL.  Psych/Affect: Mood and affect are appropriate.    Blood Counts     Recent Labs   Lab Test 10/04/23  1439 09/26/23 2131 09/18/23  1629   HGB 14.0 13.4 13.9   HCT 38.4* 38.3* 39.2*   WBC 6.6 5.5 5.1   ANEUTAUTO 5.1 3.9 3.8   ALYMPAUTO 0.8 0.9 0.7*   AMONOAUTO 0.4 0.4 0.3   AEOSAUTO 0.2 0.2 0.2   ABSBASO 0.1 0.1 0.0   NRBCMAN 0.0 0.0 0.0    167 121*       Chemistries     Basic Panel  Recent Labs   Lab Test 09/27/23  0824 09/27/23  0625 09/27/23  0049 09/26/23 2131 09/18/23  1629 08/01/23  1146   NA  --   --   --  133* 135* 133*   POTASSIUM  --   --   --  4.2 4.1 4.2   CHLORIDE  --   --   --  95* 98 96*   CO2  --   --   --  26 27 23   BUN  --   --   --  18.0 17.6 28.2*   CR  --   --   --  0.94 0.84 1.01   * 229* 199* 169* 321* 209*        Calcium, Magnesium, Phosphorus  Recent Labs   Lab Test 09/26/23 2131 09/18/23  1629 08/01/23  1146 07/25/23  1004 05/30/23  1510 05/21/23  0704 05/20/23  0706 05/19/23  0949 05/04/20  1135 04/22/20  0636   LUIS 9.5 9.3 9.4 9.7   < >  --   --  9.0   < > 8.6   MAG  --   --   --  1.7  --  1.9 1.9 1.9   < > 1.8   PHOS  --   --   --   --   --   --   --  2.7   --  3.3    < > = values in this interval not displayed.        LFTs  Recent Labs   Lab Test 09/26/23  2131 09/18/23  1629 05/31/23  1124   BILITOTAL 0.4 0.3 0.7   ALKPHOS 97 108 105   AST 24 34 29   ALT 36 54 37   ALBUMIN 4.1 4.3 3.7     Parmjit understood the above assessment and recommendations.  Multiple questions answered.  No barriers to learning identified.      Known issues that I take into account for medical decisions, with salient changes to the plan considering these complexities noted above.    Patient Active Problem List   Diagnosis    Low back pain    Hyperlipidemia    Hypertension goal BP (blood pressure) < 140/90    Type 2 diabetes mellitus (H)    Advanced directives, counseling/discussion    Migraine headache    History of diverticulitis    MENTAL HEALTH    Generalized abdominal pain    Light chain (AL) amyloidosis (H)    Insomnia due to medical condition    Obstructive sleep apnea syndrome    Restless legs syndrome (RLS)    Leg edema    Morbid obesity (H)    Venous stasis dermatitis of both lower extremities    Polyneuropathy, unspecified    Acquired lymphedema    Low blood pressure reading    History of peripheral stem cell transplant (H)    Other specified anxiety disorders    Abnormal electrocardiography    Bilateral leg edema    Onychomycosis    Thrombocytopenia, unspecified (H24)    Bilateral leg weakness    NSTEMI (non-ST elevated myocardial infarction) (H)    Essential hypertension    Peripheral vascular disease (H24)    Other cirrhosis of liver (H)    Lymphedema    Cellulitis of right lower extremity    Right upper quadrant pain    Abnormal computed tomography scan    Acquired pancytopenia (H)    Benign neoplasm of cecum    Benign neoplasm of transverse colon    Benign prostatic hyperplasia    Bilateral cataracts    Bipolar 1 disorder, manic, mild (H)    Cannabis dependence in remission (H)    Cardiovascular stress test abnormal    Chronic fatigue, unspecified    Continuous opioid  dependence (H)    Contusion of rib    Coronary arteriosclerosis    DDD (degenerative disc disease), lumbar    Decreased testosterone level    Depressed bipolar I disorder in full remission (H24)    Chronic diarrhea    Hernia of anterior abdominal wall    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)    Guillain-[place] syndrome (H24)    History of anaphylaxis due to severe acute respiratory syndrome coronavirus 2 (SARS-CoV-2) vaccine    History of colonic polyps    Hypertensive renal disease    Hypocalcemia    Immunoglobulin deficiency (H24)    Localized enlarged lymph nodes    Nonalcoholic steatohepatitis    Noninfectious gastroenteritis    Other muscle spasm    Polyp of colon    Presence of implanted infusion pump    Retention of urine    Spinal stenosis at L4-L5 level    Chronic pain    Cerebral infarction, unspecified (H)    Abdominal pain, generalized    Nausea and vomiting, unspecified vomiting type    Cellulitis of lower extremity, unspecified laterality       ------------------------------------------------------------------------------------------------------------------------------------------------    Patient Care Team         Relationship Specialty Notifications Start End    Sherwin Mejia DO PCP - General Family Medicine  3/1/23     Phone: [phone] Fax: [phone]         [facility] [email or web address] [address] [address]    Kuldeep Borrero MD MD Gastroenterology  1/11/16     Phone: [phone] Fax: [phone]         [address] [place], [place] [address]B [address] [place] [address]    Vince Henry MD Assigned PCP   2/3/19     Phone: [phone] Fax: [phone] [address] [place] [address] [facility] [address]    Dylan Jose MD MD Neurology  3/15/23     Phone: [phone] Fax: [phone]         [address] [place] [address]    Kera Jernigan APRN CNP Nurse Practitioner Cardiovascular Disease  3/16/23     Phone: [phone] Fax:  [phone]         [address] [facility] [address]

## 2023-10-04 NOTE — PROGRESS NOTES
Clinic Care Coordination Contact  [facility]/Voicemail       Clinical Data: Care Coordinator Outreach  Outreach attempted x 2.  Left message on patient's voicemail with call back information and requested return call.  Plan: Care Coordinator will send unable to contact letter with care coordinator contact information via [employer]. Care Coordinator will do no further outreaches at this time.    PRINCESS RochaN, RN, PHN   Care Coordinator-[facility] and [facility]'s [facility]  [phone]

## 2023-10-04 NOTE — NURSING NOTE
Chief Complaint   Patient presents with    Blood Draw     Labs drawn via  by RN in lab.  VS taken       Labs collected from venipuncture by RN. Vitals taken. Checked in for appointment(s).    Lara Morales RN

## 2023-10-04 NOTE — LETTER
"    10/4/2023         RE: Parmjit Hernández  [address]  [facility] [address]        Dear Colleague,    Thank you for referring your patient, Parmjit Hernández, to the [facility] BLOOD AND MARROW TRANSPLANT PROGRAM [place]. Please see a copy of my visit note below.         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      Disease presentation and baseline characteristics:   Per [facility] Records:  \"1. May 2015, patient developed increasing right upper quadrant pain. Endoscopy performed in 2015 was reportedly unremarkable. An enlarged lymph node was seen, but biopsy was negative in 2015. He subsequently developed increasing pain in later in 2015 which required multiple visits to an Emergency Room for further management.  2. September 2015, patient was seen in [facility] ED where he received an ultrasound of the abdomen that demonstrated surgically absent gallbladder with a slightly enlarged pancreas. There were mildly enlarged peripancreatic lymph nodes measuring 3.4 x 1.3 cm in size which were thought to be indeterminate.   3. April 29, 2016, patient underwent an endoscopic EUS that demonstrated modest enlargement of lymph nodes. Reportedly the biopsy was consistent with extracellular material; however, positive for amyloid stains. No monoclonal lymphocytes were seen in other specimens. Testing at [facility] reportedly demonstrated AL kappa amyloidosis subtype in the lymph nodes.  4. June 6, 2016, skeletal survey demonstrated a 1.4 cm focal lytic lesion in the right radial diaphysis and possible diffuse tiny subcentimeter lytic lesions throughout the calvarium. Blood work that time demonstrated a reduced IgG (276), IgM (12), and IgA (44). No monoclonal protein or free light chain was seen in the serum or urine.  5. June 8, 2016, bone marrow biopsy demonstrated normocellular bone marrow with 50% cellularity. Plasma cells were 5% with 4% kappa monotypic " "features on flow cytometry. The vessel walls were noted to be thick with positive Congo staining consistent with primary amyloidosis. Chromosomes demonstrated a normal 46 XY in 20 (out of 20) metaphases. FISH however demonstrated t(11;14) along with a monosomy 13 consistent with a plasma cell neoplasm.   6. June 23, 2016: He presented to [facility] ER for a second opinion on the suspected AL amyloidosis. The PET/CT from June 27, 2016 was negative for lytic lesions. Congo red stain was performed on the biopsies from the subcarinal lymph nodes, which were positive for amyloid deposition. Mass spectrometry revealed the specimen to be positive for AL (kappa) type amyloid. There was no definitive evidence for cardiac involvement.\"    Date Treatment Name Response Side Effects / Toxicities   8/1/2016 CyBorD VGPR    12/27/2016 HDT/ASCT CR           HPI:  Please see my entry above for hematologic history.  Mr. Hernández presents today for follow up.  He missed his lab appointment so restaging studies are unfortunately not available today but are currently pending.  Mr. Hernández notes that in general he is doing relatively well.  He did go into the hospital recently with concern for cellulitis, but was found to have stasis dermatitis.  He has not noted any new concerns since his hospital discharge aside from stress related to work.  He is planning to see his PCP about his DM management next week.    ASSESSMENT AND PLAN:  No evidence of clinical relapse today and recent CBC/CMP was not concerning.  Echo obtained after last visit was unremarkable.  Labs from today are pending and will likely be available early next week; I will reach out to Mr. Hernández to discuss them.    We discussed the management of relapsed AL amyloidosis if/when it becomes necessary.  He does have 5.19 x 10^6 CD45 cells/kg in storage at [facility].      Plan:   - Follow up amyloid restaging labs in process    Follow up in 6 months unless concerns on restaging labs    I " spent 30 minutes in the care of this patient today, which included time necessary for preparation for the visit, obtaining history, ordering medications/tests/procedures as medically indicated, review of pertinent medical literature, counseling of the patient, communication of recommendations to the care team, and documentation time.    Charly Hester MD    ROS:    10 point ROS neg other than the symptoms noted above in the HPI.      Past Medical History:   Diagnosis Date    Acquired lymphedema 2/4/2019    Allergic state     Amyloidosis (H)     followed by hematology Dr. Romeo status post peripheral stem cell transplant    Anxiety 5/11/2016    Anxiety and depression 12/18/2013    Bipolar I disorder (H) 9/1/2015    Under care of psychiatrist [facility]- Dr Rahman doxepin 25 mg, 2 cap bedtime DULoxetine 20 mg once daily  OLANZapine 7.5 mg  1 tab bedtime     DDD (degenerative disc disease), lumbar 8/6/2020    Depressive disorder 1995    EKG abnormality 4/20/2020    H/O bone marrow transplant (H)     Headache(784.0)     History of diverticulitis 1/27/2015    1/15-rxed with antibiotics     Hyperlipidemia LDL goal <100 10/31/2010    Hypertension 2007    Hypertension goal BP (blood pressure) < 140/90 10/11/2011    Marianne (H) 8/20/2015    Morbid obesity (H) 8/28/2018    Myalgia and myositis, unspecified     Narcotic dependence (H) 9/6/2016    None in about 6 months- 8/1/17    NSTEMI (non-ST elevated myocardial infarction) (H) 04/19/2020    OCD (obsessive compulsive disorder)     Other acquired absence of organ 94    Other cirrhosis of liver (H) possibly from vences  4/15/2020    Other specified viral warts     Peripheral edema 5/20/2018    Noncardiac Continue with  furosemide (LASIX) 20 MG tablet,  potassium       chloride SA (K-DUR/KLOR-CON M) 10 MEQ CR  Tab once daily  Basic metabolic panel    Polyneuropathy associated with underlying disease (H) 2/4/2019    Restless legs syndrome (RLS) 6/29/2017    Stasis dermatitis of both  legs 12/31/2018    Type 2 diabetes mellitus with other specified complication (H) 7/10/2017       Past Surgical History:   Procedure Laterality Date    ABDOMEN SURGERY  2007    abdominal hernia    BACK SURGERY  8/6/2020    BIOPSY  june 2015    negative    BMT PROTOCOL      for systemic amyloidosis    BONE MARROW BIOPSY, BONE SPECIMEN, NEEDLE/TROCAR N/A 6/8/2016    Procedure: BIOPSY BONE MARROW;  Surgeon: Nathan Agrawal MD;  Location:  GI    BONE MARROW BIOPSY, BONE SPECIMEN, NEEDLE/TROCAR N/A 2/20/2019    Procedure: BIOPSY BONE MARROW;  Surgeon: Michael Raygoza MD;  Location:  GI    CHOLECYSTECTOMY  1995    lap qian    COLONOSCOPY  2012    hx polyps    ESOPHAGOSCOPY, GASTROSCOPY, DUODENOSCOPY (EGD), COMBINED N/A 5/29/2015    Procedure: COMBINED ESOPHAGOSCOPY, GASTROSCOPY, DUODENOSCOPY (EGD), BIOPSY SINGLE OR MULTIPLE;  Surgeon: John Jacob MD;  Location:  GI    HERNIA REPAIR, UMBILICAL  2006    ZZC NONSPECIFIC PROCEDURE  94    Cholecystectomy    Z NONSPECIFIC PROCEDURE  2000    repair deviated septum       Family History   Problem Relation Age of Onset    Cerebrovascular Disease Mother     C.A.D. Mother     Hypertension Mother     Alcohol/Drug Mother     Arthritis Mother     Cerebrovascular Disease Maternal Grandmother     C.A.D. Maternal Grandmother     Alcohol/Drug Maternal Grandmother     C.A.D. Father     Heart Disease Father     Hypertension Father     Alcohol/Drug Father     Allergies Father     Circulatory Father     Depression Father     Respiratory Father     Asthma Father     Alcohol/Drug Paternal Grandfather     Cerebrovascular Disease Paternal Grandfather     Depression Son     Psychotic Disorder Son         anxiety disorder    Depression Daughter     Cerebrovascular Disease Maternal Grandfather     Cerebrovascular Disease Paternal Grandmother     Diabetes Brother     Allergies Sister     Depression Sister     Gynecology Sister        Social History     Tobacco Use     Smoking status: Never    Smokeless tobacco: Never   Vaping Use    Vaping Use: Never used   Substance Use Topics    Alcohol use: Not Currently     Alcohol/week: 0.0 standard drinks of alcohol    Drug use: Not Currently     Types: Cocaine, Marijuana, Methamphetamines, Opiates, IV, Amphetamines, Barbiturates, Benzodiazepines, Codeine, Fentanyl, Hashish, Hydrocodone, Hydromorphone, LSD, Oxycodone         Allergies   Allergen Reactions    Amoxicillin Diarrhea    Cephalexin Diarrhea    Liraglutide Other (See Comments), Headache and Unknown     Other reaction(s): Headache, Unknown  PN: migraines - Increased migraine frequency and severity      Sulfa Antibiotics Angioedema and Swelling     Pt has taken  Taken sulfa drugs orally without trouble. He had problems with Sulfa eye drops. Eye swelled up          Current Outpatient Medications   Medication Sig Dispense Refill    acetaminophen (TYLENOL) 500 MG tablet Take 1,000 mg by mouth every 8 hours as needed      ammonium lactate (AMLACTIN) 12 % external cream Apply topically daily To feet and legs daily & as needed 385 g 1    aspirin (ASPIRIN LOW DOSE) 81 MG tablet Take 1 tablet (81 mg) by mouth daily 30 tablet 3    atorvastatin (LIPITOR) 20 MG tablet Take 1.5 tablets (30 mg) by mouth daily 135 tablet 3    Bioflavonoid Products (CANDIDO-C) TABS Take 1,000 mg by mouth daily 90 tablet 3    blood glucose (NO BRAND SPECIFIED) lancets standard Use to test blood sugar 1 time daily or as directed. 100 Lancet 4    blood glucose (NO BRAND SPECIFIED) test strip Use to test blood sugar 1 time daily or as directed. 200 strip 1    buPROPion (WELLBUTRIN XL) 150 MG 24 hr tablet Take 1 tablet (150 mg) by mouth every morning 90 tablet 3    calcium carbonate (OS-LUIS) 1500 (600 Ca) MG tablet Take 600 mg by mouth daily      cariprazine (VRAYLAR) 4.5 MG capsule Take 1 capsule (4.5 mg) by mouth daily 90 capsule 3    clobetasol (TEMOVATE) 0.05 % external cream Apply topically 2 times daily For  psoriasis on lower body      cyclobenzaprine (FLEXERIL) 10 MG tablet Take 1 tablet (10 mg) by mouth 3 times daily as needed for muscle spasms 60 tablet 3    DULoxetine (CYMBALTA) 30 MG capsule Take 1 capsule (30 mg) by mouth 2 times daily 180 capsule 1    Folic Acid-Vit B6-Vit B12 0.5-5-0.2 MG TABS Take 1 tablet by mouth daily 90 tablet 3    furosemide (LASIX) 40 MG tablet Take 1 tablet (40 mg) by mouth daily for 7 days 7 tablet 0    glucose 40 % (400 mg/mL) gel Take by mouth every 15 minutes as needed for low blood sugar      insulin glargine (LANTUS PEN) 100 UNIT/ML pen Inject 40 Units Subcutaneous At Bedtime 15 mL 5    insulin pen needle (31G X 8 MM) 31G X 8 MM miscellaneous Use one pen needles daily or as directed. 100 each 1    loperamide (IMODIUM) 2 MG capsule Take 1 capsule (2 mg) by mouth 4 times daily as needed for diarrhea      losartan (COZAAR) 100 MG tablet Take 1 tablet (100 mg) by mouth daily 90 tablet 3    medication given by implanted intrathecal pump continuous Drug # 1: Fentanyl (Sublimaze) - Conc: 2000 mcg/mL - Total Dose / 24 hours: 1663.3 mcg  Drug # 2: Bupivacaine (Marcaine)  - Conc: 20 mg/mL - Total Dose / 24 hours: 16.633 mg  Drug # 3: Morphine (Duramorph or Infumorph)  - Conc: 9 mg/mL - Total Dose / 24 hours: 7.484 mg    Rate: 0.0325 mL/hr  Pump Reservoir Volume: 40 mL  Pump Reservoir Alarm Volume: 2 ml  Outside Clinic & Provider: Dr. Pawan Shaw [facility]  Last Refill Date: 5/18/2023  Next Refill Date: 6/25/2023  [place] Alarm Date:  Pump : [employer] SynchroMed II    Boluses: Up to 3 per day, 4 minute duration. Lock-out every 6 hours  Fentanyl: 110 mcg/dose  Bupivacaine: 1.1 mg/dose  Morphine: 0.49 mg/dose      metFORMIN (GLUCOPHAGE XR) 500 MG 24 hr tablet Take 2 tablets (1,000 mg) by mouth 2 times daily (with meals) 360 tablet 3    metoprolol succinate ER (TOPROL XL) 100 MG 24 hr tablet Take 1 tablet (100 mg) by mouth daily 90 tablet 3    metroNIDAZOLE (FLAGYL)  500 MG tablet Crush and sprinkle light layer over right leg, left leg and right toe wounds daily 60 tablet 0    modafinil (PROVIGIL) 200 MG tablet Take 2 tablets (400 mg) by mouth daily Pt has increased his dose to 400mg daily from 300mg daily.  Has a message in to his physician for a dose increase. 60 tablet 2    mometasone (ELOCON) 0.1 % external cream Apply topically daily For psoriasis on face      multivitamin w/minerals (THERA-VIT-M) tablet Take 1 tablet by mouth daily Men's 50+      nitroGLYcerin (NITROSTAT) 0.4 MG sublingual tablet Place 1 tablet (0.4 mg) under the tongue every 5 minutes as needed for chest pain For chest pain place 1 tablet under the tongue every 5 minutes for 3 doses. If symptoms persist 5 minutes after 1st dose call 911. 30 tablet 1    OVER-THE-COUNTER Take 3 capsules by mouth daily Supplement for brain health, unknown brand/ingredients      oxyCODONE-acetaminophen (PERCOCET) 5-325 MG tablet Take 1 tablet by mouth 2 times daily as needed for pain      POTASSIUM PO Take 1 tablet by mouth daily Unspecified tablet strength; patient estimated 600 mg, takes for leg cramps      pregabalin (LYRICA) 100 MG capsule Take 1 capsule (100 mg) by mouth daily as needed (In addition to 100mg 3 times per day scheduled) 30 capsule 2    pregabalin (LYRICA) 100 MG capsule Take 1 capsule (100 mg) by mouth 3 times daily 30 capsule 2    senna-docusate (SENOKOT-S/PERICOLACE) 8.6-50 MG tablet Take 1-2 tablets by mouth 2 times daily as needed      SUMAtriptan (IMITREX) 50 MG tablet Take 1 tablet (50 mg) by mouth at onset of headache for migraine May repeat in 2 hours. Max 4 tablets/24 hours. 8 tablet 1    tamsulosin (FLOMAX) 0.4 MG capsule Take 1 capsule (0.4 mg) by mouth 2 times daily 180 capsule 3    testosterone (ANDROGEL/TESTIM) 50 MG/5GM (1%) topical gel Place 1 packet (50 mg of testosterone) onto the skin daily 30 packet 1    vitamin B-Complex Take 3 tablets by mouth daily      vitamin D3 (CHOLECALCIFEROL)  50 mcg (2000 units) tablet Take 1 tablet (50 mcg) by mouth daily 90 tablet 3         Physical Exam:     Vital Signs: BP (!) 143/93   Pulse 88   Temp 98.8  F (37.1  C) (Oral)   Resp 16   Wt 117.2 kg (258 lb 6.4 oz)   SpO2 93%   BMI 41.71 kg/m      ECO  General Appearance: alert, and no distress  Eyes: PERRL, conjunctiva and lids normal, sclera nonicteric  Ears/Nose/M/Throat: Oral mucosa and posterior oropharynx normal, moist mucous membranes  Neck supple, non-tender, free range of motion, no adenopathy  Cardio/Vascular:regular rate and rhythm, normal S1 and S2, no murmur  Resp Effort And Auscultation: Normal - Clear to auscultation without rales, rhonchi, or wheezing.  GI: soft, nontender, bowel sounds present in all four quadrants, no hepatosplenomegaly  Lymphatics:no significant enlargement of lymph nodes globally   Musculoskeletal: Musculoskeletal normal  Edema: none  Skin: Skin color, texture, turgor normal. No rashes or lesions in visualized areas; patient declined to remove clothes for examination of recent RLE cellulitis  Neurologic: Gait normal.  Sensation grossly WNL.  Psych/Affect: Mood and affect are appropriate.    Blood Counts     Recent Labs   Lab Test 10/04/23  1439 23  1629   HGB 14.0 13.4 13.9   HCT 38.4* 38.3* 39.2*   WBC 6.6 5.5 5.1   ANEUTAUTO 5.1 3.9 3.8   ALYMPAUTO 0.8 0.9 0.7*   AMONOAUTO 0.4 0.4 0.3   AEOSAUTO 0.2 0.2 0.2   ABSBASO 0.1 0.1 0.0   NRBCMAN 0.0 0.0 0.0    167 121*       Chemistries     Basic Panel  Recent Labs   Lab Test 23  0824 23  0625 23  0049 23  2131 23  1629 23  1146   NA  --   --   --  133* 135* 133*   POTASSIUM  --   --   --  4.2 4.1 4.2   CHLORIDE  --   --   --  95* 98 96*   CO2  --   --   --   23   BUN  --   --   --  18.0 17.6 28.2*   CR  --   --   --  0.94 0.84 1.01   * 229* 199* 169* 321* 209*        Calcium, Magnesium, Phosphorus  Recent Labs   Lab Test 23  2135  09/18/23  1629 08/01/23  1146 07/25/23  1004 05/30/23  1510 05/21/23  0704 05/20/23  0706 05/19/23  0949 05/04/20  1135 04/22/20  0636   LUIS 9.5 9.3 9.4 9.7   < >  --   --  9.0   < > 8.6   MAG  --   --   --  1.7  --  1.9 1.9 1.9   < > 1.8   PHOS  --   --   --   --   --   --   --  2.7  --  3.3    < > = values in this interval not displayed.        LFTs  Recent Labs   Lab Test 09/26/23  2131 09/18/23  1629 05/31/23  1124   BILITOTAL 0.4 0.3 0.7   ALKPHOS 97 108 105   AST 24 34 29   ALT 36 54 37   ALBUMIN 4.1 4.3 3.7     Parmjit understood the above assessment and recommendations.  Multiple questions answered.  No barriers to learning identified.      Known issues that I take into account for medical decisions, with salient changes to the plan considering these complexities noted above.    Patient Active Problem List   Diagnosis    Low back pain    Hyperlipidemia    Hypertension goal BP (blood pressure) < 140/90    Type 2 diabetes mellitus (H)    Advanced directives, counseling/discussion    Migraine headache    History of diverticulitis    MENTAL HEALTH    Generalized abdominal pain    Light chain (AL) amyloidosis (H)    Insomnia due to medical condition    Obstructive sleep apnea syndrome    Restless legs syndrome (RLS)    Leg edema    Morbid obesity (H)    Venous stasis dermatitis of both lower extremities    Polyneuropathy, unspecified    Acquired lymphedema    Low blood pressure reading    History of peripheral stem cell transplant (H)    Other specified anxiety disorders    Abnormal electrocardiography    Bilateral leg edema    Onychomycosis    Thrombocytopenia, unspecified (H24)    Bilateral leg weakness    NSTEMI (non-ST elevated myocardial infarction) (H)    Essential hypertension    Peripheral vascular disease (H24)    Other cirrhosis of liver (H)    Lymphedema    Cellulitis of right lower extremity    Right upper quadrant pain    Abnormal computed tomography scan    Acquired pancytopenia (H)    Benign  neoplasm of cecum    Benign neoplasm of transverse colon    Benign prostatic hyperplasia    Bilateral cataracts    Bipolar 1 disorder, manic, mild (H)    Cannabis dependence in remission (H)    Cardiovascular stress test abnormal    Chronic fatigue, unspecified    Continuous opioid dependence (H)    Contusion of rib    Coronary arteriosclerosis    DDD (degenerative disc disease), lumbar    Decreased testosterone level    Depressed bipolar I disorder in full remission (H24)    Chronic diarrhea    Hernia of anterior abdominal wall    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)    Guillain-Kanona syndrome (H24)    History of anaphylaxis due to severe acute respiratory syndrome coronavirus 2 (SARS-CoV-2) vaccine    History of colonic polyps    Hypertensive renal disease    Hypocalcemia    Immunoglobulin deficiency (H24)    Localized enlarged lymph nodes    Nonalcoholic steatohepatitis    Noninfectious gastroenteritis    Other muscle spasm    Polyp of colon    Presence of implanted infusion pump    Retention of urine    Spinal stenosis at L4-L5 level    Chronic pain    Cerebral infarction, unspecified (H)    Abdominal pain, generalized    Nausea and vomiting, unspecified vomiting type    Cellulitis of lower extremity, unspecified laterality       ------------------------------------------------------------------------------------------------------------------------------------------------    Patient Care Team         Relationship Specialty Notifications Start End    Sherwin Mejia DO PCP - General Family Medicine  3/1/23     Phone: [phone] Fax: [phone] [address] [facility] [address] [facility] [address]    Kuldeep Borrero MD MD Gastroenterology  1/11/16     Phone: [phone] Fax: [phone]         [address] [facility] [facility] [address]    Vince Henry MD Assigned PCP   2/3/19     Phone: [phone] Fax: [phone] [address] [facility] [address]  [address] [facility] [address]    Dylan Jose MD MD Neurology  3/15/23     Phone: [phone] Fax: [phone]         [address] [facility] [address]    Kera Jernigan APRN CNP Nurse Practitioner Cardiovascular Disease  3/16/23     Phone: [phone] Fax: [phone] [address] [facility] [address]             [employer]

## 2023-10-04 NOTE — NURSING NOTE
Influenza vaccine administered in left deltoid without complication per orders from Charly Hester. Patient tolerated well and was discharged.     VIS provided.      Lisandro Carl LPN on 10/4/2023 at 3:49 PM

## 2023-10-04 NOTE — NURSING NOTE
[employer] vaccine for COVID-19 administered in left deltoid. Patient tolerated well and was discharged after 15 minute observation period. VIS provided. Consent form scanned to chart.    Lisandro Carl LPN on 10/4/2023 at 3:49 PM

## 2023-10-05 LAB
IGA SERPL-MCNC: 52 MG/DL (ref 84–499)
IGG SERPL-MCNC: 509 MG/DL (ref 610–1616)
IGM SERPL-MCNC: 32 MG/DL (ref 35–242)
KAPPA LC FREE SER-MCNC: 1.49 MG/DL (ref 0.33–1.94)
KAPPA LC FREE/LAMBDA FREE SER NEPH: 1.42 {RATIO} (ref 0.26–1.65)
LAMBDA LC FREE SERPL-MCNC: 1.05 MG/DL (ref 0.57–2.63)
TOTAL PROTEIN SERUM FOR ELP: 6.2 G/DL (ref 6.4–8.3)

## 2023-10-06 LAB
ALBUMIN SERPL ELPH-MCNC: 4 G/DL (ref 3.7–5.1)
ALPHA1 GLOB SERPL ELPH-MCNC: 0.3 G/DL (ref 0.2–0.4)
ALPHA2 GLOB SERPL ELPH-MCNC: 0.7 G/DL (ref 0.5–0.9)
B-GLOBULIN SERPL ELPH-MCNC: 0.7 G/DL (ref 0.6–1)
GAMMA GLOB SERPL ELPH-MCNC: 0.5 G/DL (ref 0.7–1.6)
M PROTEIN SERPL ELPH-MCNC: 0 G/DL
PROT PATTERN SERPL ELPH-IMP: ABNORMAL
PROT PATTERN SERPL IFE-IMP: NORMAL

## 2023-10-11 ENCOUNTER — OFFICE VISIT (OUTPATIENT)
Dept: FAMILY MEDICINE | Facility: CLINIC | Age: 68
End: 2023-10-11
Payer: COMMERCIAL

## 2023-10-11 VITALS
RESPIRATION RATE: 18 BRPM | WEIGHT: 262.4 LBS | HEIGHT: 66 IN | BODY MASS INDEX: 42.17 KG/M2 | HEART RATE: 68 BPM | OXYGEN SATURATION: 93 % | SYSTOLIC BLOOD PRESSURE: 101 MMHG | TEMPERATURE: 97.5 F | DIASTOLIC BLOOD PRESSURE: 69 MMHG

## 2023-10-11 DIAGNOSIS — G89.4 CHRONIC PAIN SYNDROME: ICD-10-CM

## 2023-10-11 DIAGNOSIS — I87.2 VENOUS STASIS DERMATITIS OF BOTH LOWER EXTREMITIES: ICD-10-CM

## 2023-10-11 DIAGNOSIS — I10 BENIGN ESSENTIAL HYPERTENSION: ICD-10-CM

## 2023-10-11 DIAGNOSIS — E11.69 TYPE 2 DIABETES MELLITUS WITH OTHER SPECIFIED COMPLICATION, WITHOUT LONG-TERM CURRENT USE OF INSULIN (H): Primary | ICD-10-CM

## 2023-10-11 DIAGNOSIS — E66.01 MORBID OBESITY (H): ICD-10-CM

## 2023-10-11 DIAGNOSIS — G62.9 PERIPHERAL POLYNEUROPATHY: ICD-10-CM

## 2023-10-11 PROCEDURE — 99495 TRANSJ CARE MGMT MOD F2F 14D: CPT | Performed by: FAMILY MEDICINE

## 2023-10-11 RX ORDER — RESPIRATORY SYNCYTIAL VIRUS VACCINE 120MCG/0.5
0.5 KIT INTRAMUSCULAR ONCE
Qty: 1 EACH | Refills: 0 | Status: CANCELLED | OUTPATIENT
Start: 2023-10-11 | End: 2023-10-11

## 2023-10-11 ASSESSMENT — PAIN SCALES - GENERAL: PAINLEVEL: NO PAIN (1)

## 2023-10-11 ASSESSMENT — PATIENT HEALTH QUESTIONNAIRE - PHQ9
SUM OF ALL RESPONSES TO PHQ QUESTIONS 1-9: 8
10. IF YOU CHECKED OFF ANY PROBLEMS, HOW DIFFICULT HAVE THESE PROBLEMS MADE IT FOR YOU TO DO YOUR WORK, TAKE CARE OF THINGS AT HOME, OR GET ALONG WITH OTHER PEOPLE: SOMEWHAT DIFFICULT
SUM OF ALL RESPONSES TO PHQ QUESTIONS 1-9: 8

## 2023-10-11 NOTE — PROGRESS NOTES
Assessment and Plan    1. Type 2 diabetes mellitus with other specified complication, without long-term current use of insulin (H)  We discussed treatment options and I recommended he continue metformin and increase the Lantus insulin to 50 units at bedtime.  We also decided to start Trulicity.  Hopefully this will be covered by his insurance.  I also put in a referral for him to start seeing our pharmacist to help with medication management given the complexity of his health issues.  - dulaglutide (TRULICITY) 0.75 MG/0.5ML pen; Inject 0.75 mg Subcutaneous every 7 days  Dispense: 2 mL; Refill: 5  - Med Therapy Management Referral    2. Morbid obesity (H)  He will continue to work on lifestyle modifications to help with weight loss.    3. Venous stasis dermatitis of both lower extremities  This issue is stable.  He is on a diuretic and reports that the wound on his right leg is healing well since being hospitalized.    4. Peripheral polyneuropathy  He feels like this issue is stable.  He is on Lyrica and following at the pain clinic.    5. Chronic pain syndrome  He feels like this issue is stable on his current treatments.    6. Benign essential hypertension  Blood pressure is well controlled on his current medications.        Lavinia Almendarez is a 68 year old, presenting for the following health issues:  Diabetes        10/11/2023    11:09 AM   Additional Questions   Roomed by Vandana RINCON       HPI     Patient concerns;  -Ozempic/Costs  -Lymphedema    Hospital Follow-up Visit:    Hospital/Nursing Home/IP Rehab Facility: [facility]  Date of Admission: 9/26  Date of Discharge: 9/27  Reason(s) for Admission: Lymphedema    Was your hospitalization related to COVID-19? No   Problems taking medications regularly:  About 2 days/week, forgets some of the medications  Medication changes since discharge: Discontinued hydrochlorithiazide, Prescribed Lasix for 1 week  Problems adhering to  non-medication therapy:  None    Summary of hospitalization:  [facility] hospital discharge summary reviewed  Diagnostic Tests/Treatments reviewed.  Follow up needed: none  Other Healthcare Providers Involved in Patient s Care:         None  Update since discharge: improved.     He presents to the clinic today to discuss medication adjustments to help with getting better control of his blood sugars.  He reports that he is frequently running in the 200s when he is fasting in the mornings.  His hemoglobin A1c on 9/18/2023 was 9.8%.  Just a few years ago he was down in the 5% range.  He reports that he is eating a very poor diet which he feels is the biggest contributing factor to his worsening glucose control.  He was recently given a prescription for Ozempic, but he reports that the co-pay was too expensive.  He is interested in trying Trulicity.  He did not tolerate Victoza in the past.    He was recently admitted to the hospital for right lower extremity cellulitis.  He was given a dose of IV Ancef and Lasix.  Since there is no overt evidence of infection antibiotics were not continued.  Blood culture results were negative.  Lab results were stable.  He reports that symptoms have improved.    He has a complex medical history significant for poorly controlled type 2 diabetes, peripheral neuropathy, peripheral vascular disease, coronary artery disease, history of NSTEMI with stent placement, amyloidosis, history of peripheral stem cell transplant, thrombocytopenia, obesity, fatty cirrhosis of the liver, bipolar disorder, migraine headaches, chronic pain syndrome, hypertension, hyperlipidemia and obstructive sleep apnea.         Plan of care communicated with patient                 Review of Systems   Constitutional, HEENT, cardiovascular, pulmonary, GI, , musculoskeletal, neuro, skin, endocrine and psych systems are negative, except as otherwise noted.      Objective    /69   Pulse 68   Temp 97.5  F  "(36.4  C) (Temporal)   Resp 18   Ht 1.676 m (5' 6\")   Wt 119 kg (262 lb 6.4 oz)   SpO2 93%   BMI 42.35 kg/m    Body mass index is 42.35 kg/m .  Physical Exam   GENERAL: healthy, alert and no distress  EYES: Eyes grossly normal to inspection, PERRL and conjunctivae and sclerae normal  HENT: ear canals and TM's normal, nose and mouth without ulcers or lesions  NECK: no adenopathy, no asymmetry, masses, or scars and thyroid normal to palpation  RESP: lungs clear to auscultation - no rales, rhonchi or wheezes  CV: regular rate and rhythm, normal S1 S2, no S3 or S4, no murmur, click or rub  ABDOMEN: soft, nontender, no hepatosplenomegaly, no masses and bowel sounds normal  MS: Gait is mildly unsteady.  1+ lower extremity edema  SKIN: Both legs are mildly erythematous.  The wound in the right  NEURO: Normal strength and tone, mentation intact and speech normal  PSYCH: mentation appears normal, affect normal/bright                      Answers submitted by the patient for this visit:  Patient Health Questionnaire (Submitted on 10/11/2023)  If you checked off any problems, how difficult have these problems made it for you to do your work, take care of things at home, or get along with other people?: Somewhat difficult  PHQ9 TOTAL SCORE: 8    "

## 2023-10-19 ENCOUNTER — OFFICE VISIT (OUTPATIENT)
Dept: URGENT CARE | Facility: URGENT CARE | Age: 68
End: 2023-10-19
Payer: COMMERCIAL

## 2023-10-19 ENCOUNTER — TELEPHONE (OUTPATIENT)
Dept: FAMILY MEDICINE | Facility: CLINIC | Age: 68
End: 2023-10-19
Payer: COMMERCIAL

## 2023-10-19 ENCOUNTER — ANCILLARY PROCEDURE (OUTPATIENT)
Dept: GENERAL RADIOLOGY | Facility: CLINIC | Age: 68
End: 2023-10-19
Payer: COMMERCIAL

## 2023-10-19 VITALS
HEART RATE: 105 BPM | DIASTOLIC BLOOD PRESSURE: 85 MMHG | OXYGEN SATURATION: 90 % | TEMPERATURE: 100.6 F | BODY MASS INDEX: 40.72 KG/M2 | WEIGHT: 252.3 LBS | SYSTOLIC BLOOD PRESSURE: 146 MMHG

## 2023-10-19 DIAGNOSIS — J18.9 PNEUMONIA OF BOTH LOWER LOBES DUE TO INFECTIOUS ORGANISM: Primary | ICD-10-CM

## 2023-10-19 DIAGNOSIS — R05.1 ACUTE COUGH: ICD-10-CM

## 2023-10-19 PROCEDURE — 71046 X-RAY EXAM CHEST 2 VIEWS: CPT | Mod: TC | Performed by: RADIOLOGY

## 2023-10-19 PROCEDURE — 99213 OFFICE O/P EST LOW 20 MIN: CPT

## 2023-10-19 RX ORDER — LEVOFLOXACIN 750 MG/1
750 TABLET, FILM COATED ORAL DAILY
Qty: 5 TABLET | Refills: 0 | Status: SHIPPED | OUTPATIENT
Start: 2023-10-19 | End: 2023-10-24

## 2023-10-19 NOTE — PATIENT INSTRUCTIONS
Chest x-ray shows mild patchy bibasilar opacities per radiologist report; which given your symptoms and duration of illness would be considered pneumonia.  Take the antibiotic as prescribed and finish the full course even if symptoms improve.  Try yogurt with active cultures or probiotics such as Culturelle daily to help prevent diarrhea while using antibiotics.  Follow up with your primary care provider in 7-10 days for a recheck.

## 2023-10-19 NOTE — PROGRESS NOTES
ASSESSMENT:  (J18.9) Pneumonia of both lower lobes due to infectious organism  (primary encounter diagnosis)  Plan: levofloxacin (LEVAQUIN) 750 MG tablet    (R05.1) Acute cough  Plan: XR Chest 2 Views    PLAN:  Informed the patient that the chest x-ray shows mild patchy bibasilar opacities per the radiologist report which given his symptoms and duration of illness would be considered pneumonia.  Pneumonia patient instructions discussed and provided.  Reviewed the patient's past medical history which includes a history of a non-ST elevated myocardial infarction, abnormal cardiovascular stress test and coronary arterial sclerosis.  A macrolide was avoided for the course of antibiotic treatment based on this history.  The patient's allergies also exclude the ability to utilize penicillin or cephalosporins.  Based on these limitations for antibiotic treatment, Levaquin was prescribed.  The patient was informed of the need to take the antibiotic as prescribed and finish the full course even if symptoms improve.  We discussed trying yogurt with active cultures or probiotic such as Culturelle daily to help prevent diarrhea while taking the antibiotic.  Informed the patient to follow-up with his primary care provider in 7 to 10 days for recheck.  Patient acknowledged his understanding of the above plan.    The use of Dragon[employer] dictation services may have been used to construct the content in this note; any grammatical or spelling errors are non-intentional. Please contact the author of this note directly if you are in need of any clarification.      JUNIOR Howard CNP      SUBJECTIVE:  Parmjit Hernández is a 68 year old male who presents to the clinic today with a chief complaint of cough  for 1 week.  His cough is described as productive green.    The patient's symptoms are severe and worsening.  Associated symptoms include congestion, rhinorrhea, and wheezing. The patient's symptoms are exacerbated by  activity.  Patient has been using Sudafed and Robitussin DM  to improve symptoms.    ROS  Negative except noted above.     OBJECTIVE:  BP (!) 146/85   Pulse 105   Temp (!) 100.6  F (38.1  C) (Tympanic)   Wt 114.4 kg (252 lb 4.8 oz)   SpO2 90%   BMI 40.72 kg/m    GENERAL APPEARANCE: healthy, alert and no distress  EYES: EOMI,  PERRL, conjunctiva clear  HENT: nose and mouth without erythema, ulcers or lesions and oral mucous membranes moist, no erythema noted  NECK: supple, nontender, no lymphadenopathy  RESP: lungs clear to auscultation - no rales, rhonchi or wheezes  CV: regular rates and rhythm, normal S1 S2, no murmur noted  SKIN: Multiple plaque psoriasis lesions visible on upper extremities    X-RAY: Mild patchy bibasilar opacities per the radiologist report

## 2023-10-19 NOTE — TELEPHONE ENCOUNTER
Patient calling  Experiencing cold symptoms over the past week  Nasal drainage/mucus has turned green/yellow  Would like OV today  Denies difficulty breathing  No available appointments at [facility]  Transferred to central scheduling to check other locations availability  Divina AGRAWAL RN

## 2023-10-25 ENCOUNTER — TELEPHONE (OUTPATIENT)
Dept: FAMILY MEDICINE | Facility: CLINIC | Age: 68
End: 2023-10-25

## 2023-10-25 ENCOUNTER — OFFICE VISIT (OUTPATIENT)
Dept: FAMILY MEDICINE | Facility: CLINIC | Age: 68
End: 2023-10-25
Payer: COMMERCIAL

## 2023-10-25 VITALS
DIASTOLIC BLOOD PRESSURE: 78 MMHG | RESPIRATION RATE: 18 BRPM | TEMPERATURE: 97.1 F | OXYGEN SATURATION: 95 % | HEIGHT: 66 IN | WEIGHT: 265.1 LBS | HEART RATE: 80 BPM | SYSTOLIC BLOOD PRESSURE: 132 MMHG | BODY MASS INDEX: 42.61 KG/M2

## 2023-10-25 DIAGNOSIS — E66.01 MORBID OBESITY (H): ICD-10-CM

## 2023-10-25 DIAGNOSIS — I10 BENIGN ESSENTIAL HYPERTENSION: ICD-10-CM

## 2023-10-25 DIAGNOSIS — E11.69 TYPE 2 DIABETES MELLITUS WITH OTHER SPECIFIED COMPLICATION, WITHOUT LONG-TERM CURRENT USE OF INSULIN (H): ICD-10-CM

## 2023-10-25 DIAGNOSIS — G47.33 OBSTRUCTIVE SLEEP APNEA SYNDROME: ICD-10-CM

## 2023-10-25 DIAGNOSIS — J18.9 PNEUMONIA OF BOTH LOWER LOBES DUE TO INFECTIOUS ORGANISM: Primary | ICD-10-CM

## 2023-10-25 PROCEDURE — 99214 OFFICE O/P EST MOD 30 MIN: CPT | Performed by: FAMILY MEDICINE

## 2023-10-25 RX ORDER — RESPIRATORY SYNCYTIAL VIRUS VACCINE 120MCG/0.5
0.5 KIT INTRAMUSCULAR ONCE
Qty: 1 EACH | Refills: 0 | Status: CANCELLED | OUTPATIENT
Start: 2023-10-25 | End: 2023-10-25

## 2023-10-25 ASSESSMENT — ANXIETY QUESTIONNAIRES
GAD7 TOTAL SCORE: 8
6. BECOMING EASILY ANNOYED OR IRRITABLE: SEVERAL DAYS
2. NOT BEING ABLE TO STOP OR CONTROL WORRYING: SEVERAL DAYS
7. FEELING AFRAID AS IF SOMETHING AWFUL MIGHT HAPPEN: NOT AT ALL
GAD7 TOTAL SCORE: 8
IF YOU CHECKED OFF ANY PROBLEMS ON THIS QUESTIONNAIRE, HOW DIFFICULT HAVE THESE PROBLEMS MADE IT FOR YOU TO DO YOUR WORK, TAKE CARE OF THINGS AT HOME, OR GET ALONG WITH OTHER PEOPLE: NOT DIFFICULT AT ALL
3. WORRYING TOO MUCH ABOUT DIFFERENT THINGS: SEVERAL DAYS
5. BEING SO RESTLESS THAT IT IS HARD TO SIT STILL: MORE THAN HALF THE DAYS
1. FEELING NERVOUS, ANXIOUS, OR ON EDGE: SEVERAL DAYS
4. TROUBLE RELAXING: MORE THAN HALF THE DAYS

## 2023-10-25 ASSESSMENT — PAIN SCALES - GENERAL: PAINLEVEL: MODERATE PAIN (4)

## 2023-10-25 NOTE — PROGRESS NOTES
Assessment & Plan     Pneumonia of both lower lobes due to infectious organism  Symptoms appear to be improved since undergoing treatment for pneumonia last week with Levaquin.  Lungs sound good on exam and his oxygen saturation is stable.  He still appears to be a little tired and I encouraged him to make sure he is getting adequate rest as he is recovering.    Morbid obesity (H)  He will continue to work on lifestyle modifications help with weight loss.    Benign essential hypertension  His blood pressure was initially up, but this came down on the second check.  I recommended he continue his current medications.    Type 2 diabetes mellitus with other specified complication, without long-term current use of insulin (H)  I recommended he continue his current medications as prescribed.      Obstructive sleep apnea syndrome  He is going to be reaching out to his sleep clinic about issues he is having with his CPAP machine.  He will let me know if he needs a new one.           MED REC REQUIRED  Post Medication Reconciliation Status:  Discharge medications reconciled, continue medications without change  MEDICATIONS:  Continue current medications without change    Sherwin Mcdermott DO  [facility]    Lavinia Almendarez is a 68 year old, presenting for the following health issues:  Follow Up (Pneumonia/)      History of Present Illness       Back Pain:  He presents for follow up of back pain. Patient's back pain is a chronic problem.  Location of back pain:  Right middle of back, left middle of back, left shoulder, right hip and other  Description of back pain: dull ache and sharp  Back pain spreads: nowhere    Since patient first noticed back pain, pain is: always present, but gets better and worse  Does back pain interfere with his job:  Yes       Mental Health Follow-up:  Patient presents to follow-up on Anxiety.    Patient's anxiety since last visit has been:  Medium  The patient is not  "having other symptoms associated with anxiety.  Any significant life events: job concerns and financial concerns  Patient is not feeling anxious or having panic attacks.  Patient has no concerns about alcohol or drug use.    Diabetes:   He presents for follow up of diabetes.  He is checking home blood glucose one time daily.   He checks blood glucose before meals.  Blood glucose is sometimes over 200 and never under 70. He is aware of hypoglycemia symptoms including shakiness, dizziness and weakness.   He is concerned about blood sugar frequently over 200.   He is having numbness in feet, excessive thirst and weight gain.            He eats 0-1 servings of fruits and vegetables daily.He consumes 0 sweetened beverage(s) daily.He exercises with enough effort to increase his heart rate 10 to 19 minutes per day.  He exercises with enough effort to increase his heart rate 3 or less days per week. He is missing 2 dose(s) of medications per week.  He is not taking prescribed medications regularly due to cost of medication and remembering to take.     On 10/19/2023 he was seen in the urgent care for a cough that was productive with green phlegm.  They checked an x-ray which showed mild patchy bibasilar opacities.  He was treated with Levaquin for pneumonia.  He reports that symptoms are improving.  He is not acutely short of breath and the coughing symptoms are still lingering, but getting better.  He reports having some issues with his CPAP machine and is planning to contact his sleep clinic about it.              Review of Systems   Constitutional, HEENT, cardiovascular, pulmonary, GI, , musculoskeletal, neuro, skin, endocrine and psych systems are negative, except as otherwise noted.      Objective    /78   Pulse 80   Temp 97.1  F (36.2  C) (Temporal)   Resp 18   Ht 1.676 m (5' 6\")   Wt 120.2 kg (265 lb 1.6 oz)   SpO2 95%   BMI 42.79 kg/m    Body mass index is 42.79 kg/m .  Physical Exam   GENERAL: " healthy, alert and no distress  EYES: Eyes grossly normal to inspection, PERRL and conjunctivae and sclerae normal  HENT: nose and mouth without ulcers or lesions  NECK: no adenopathy, no asymmetry, masses, or scars and thyroid normal to palpation  RESP: lungs clear to auscultation - no rales, rhonchi or wheezes  CV: regular rate and rhythm, normal S1 S2, no S3 or S4, no murmur, click or rub, no peripheral edema and peripheral pulses strong  MS: no gross musculoskeletal defects noted, no edema  SKIN: no suspicious lesions or rashes  NEURO: Normal strength and tone, mentation intact and speech normal  PSYCH: mentation appears normal, affect normal/bright

## 2023-11-02 ENCOUNTER — TRANSFERRED RECORDS (OUTPATIENT)
Dept: HEALTH INFORMATION MANAGEMENT | Facility: CLINIC | Age: 68
End: 2023-11-02
Payer: COMMERCIAL

## 2023-11-02 ENCOUNTER — VIRTUAL VISIT (OUTPATIENT)
Dept: PHARMACY | Facility: CLINIC | Age: 68
End: 2023-11-02
Payer: COMMERCIAL

## 2023-11-02 ENCOUNTER — TELEPHONE (OUTPATIENT)
Dept: PHARMACY | Facility: CLINIC | Age: 68
End: 2023-11-02
Payer: COMMERCIAL

## 2023-11-02 DIAGNOSIS — L40.9 PSORIASIS: ICD-10-CM

## 2023-11-02 DIAGNOSIS — G89.29 CHRONIC PAIN: ICD-10-CM

## 2023-11-02 DIAGNOSIS — I10 HYPERTENSION GOAL BP (BLOOD PRESSURE) < 140/90: ICD-10-CM

## 2023-11-02 DIAGNOSIS — R79.89 LOW TESTOSTERONE IN MALE: ICD-10-CM

## 2023-11-02 DIAGNOSIS — E11.65 TYPE 2 DIABETES MELLITUS WITH HYPERGLYCEMIA, WITH LONG-TERM CURRENT USE OF INSULIN (H): Primary | ICD-10-CM

## 2023-11-02 DIAGNOSIS — E78.5 HYPERLIPIDEMIA: ICD-10-CM

## 2023-11-02 DIAGNOSIS — F31.11 BIPOLAR 1 DISORDER, MANIC, MILD (H): ICD-10-CM

## 2023-11-02 DIAGNOSIS — Z79.4 TYPE 2 DIABETES MELLITUS WITH HYPERGLYCEMIA, WITH LONG-TERM CURRENT USE OF INSULIN (H): Primary | ICD-10-CM

## 2023-11-02 DIAGNOSIS — N40.0 BENIGN PROSTATIC HYPERPLASIA, UNSPECIFIED WHETHER LOWER URINARY TRACT SYMPTOMS PRESENT: ICD-10-CM

## 2023-11-02 DIAGNOSIS — Z78.9 TAKES DIETARY SUPPLEMENTS: ICD-10-CM

## 2023-11-02 PROCEDURE — 99207 PR NO CHARGE LOS: CPT

## 2023-11-02 NOTE — PROGRESS NOTES
Medication Therapy Management (MTM) Encounter    ASSESSMENT:                            Medication Adherence/Access: See below for considerations    Psoriasis: Would benefit from restarting Skyrizi if able to acquire it through the  or other payment assistance program. Plan in place to see Derm today.      Diabetes:   Cost concerns with medications - Upon investigation the Trulicity is $107 per 4 weeks and Lantus has not been dispensed since March where there was a copay charge of $105 for 15 mL .   The patient would benefit from a dose increase of the Trulicity to 1.5 mg weekly, determining with the Patient Assistance Program if he can get help paying for this medication. Continuing GLP-1 therapy would be beneficial. Also, sent a new prescription for a continuous glucose monitor - since he is on insulin daily, this should be covered by Medicare Part B for him (will need to get through [employer] mail).     Hyperlipidemia:   LDL above goal <70, due to time, not addressed today.     Hypertension:   Last BP in chart at goal, one prior was elevated. Will continue to monitor. No changes needed today.     Bipolar Disorder:   Stable with current regimen. Further considerations would need to be made if the patient is unable to get Vraylar from the .      Pain:   Followed by pain clinic. ~$47/month for Lyrica, may be patient assistance programs available.     Low Testosterone:   Last Testosterone level was on the low side. Would benefit from regular use of Androgel. If continues to be difficult to administer, consider change in formulation (patches).     BPH:   Stable     Supplements:   Stable    PLAN:                            Increase Trulicity to 1.5mg weekly (after you finish your 0.75mg weekly supply).   We'll look into getting you a continuous glucose monitor. This will need to come from [facility].  We'll keep you posted on what we find out (but we believe that this should be covered by  "Medicare since you are taking one shot of insulin daily).     Follow-up: 11/21/23 at 1:30 pm     SUBJECTIVE/OBJECTIVE:                          Erickson Hernández is a 68 year old male called for a follow-up visit from 6/20/23.       Reason for visit: med review - has cost concerns with his medications.    Allergies/ADRs: Reviewed in chart  Past Medical History: Reviewed in chart  Tobacco: He reports that he has never smoked. He has never used smokeless tobacco.    Medication Adherence/Access: reports his insulin is >$100 per month.   Has gotten medications through patient assistance programs before - but currently he is not signed up (Skyrizi, Lantus, Vraylar)  Reports he misses his Lantus/pills about once a week on average.     Psoriasis:   Previous use of Skyrizi (not currently taking due to cost).   Topicals have been ineffective - clobetasol, mometasone.   Am-Lactin when legs are burning/stinging (finds compression stockings most helpful)  Reports plaques over >30% of his body \"I look like a stone man\"  Reports he has follow-up with Derm scheduled today.     Diabetes   Trulicity 0.75 mg once weekly (started about three weeks ago)  Lantus 40 units at Bedtime (prescribed as 50 units daily)  Metformin  mg two tablets twice daily with meals   Aspirin 81mg daily  Has not noticed any weight loss from Trulicity.   Blood sugar monitoring:  Reports blood sugars are \"high, they're over 200\". Denies any low blood sugars. Has experience using CGM (professional) but reports that he \"didn't qualify\" for personal CGM use because his glucose was controlled. He would be interested in using CGM if affordable.     Eye exam is up to date  Foot exam is up to date  Urine Albumin:   Lab Results   Component Value Date    UMALCR 267.00 (H) 02/23/2023      Lab Results   Component Value Date    A1C 9.8 (H) 09/18/2023     Hyperlipidemia     Atorvastatin 20 mg daily  Patient reports no significant myalgias or other side effects.     Recent " "Labs   Lab Test 02/23/23  1227 07/23/21  0831 06/23/16  0000 09/01/15  1201   CHOL 153 112   < > 195   HDL 35* 32*   < > 33*   LDL 79 53   < > 140*   TRIG 197* 133   < > 109   CHOLHDLRATIO  --   --   --  5.9*    < > = values in this interval not displayed.     Hypertension   Metoprolol ER 100mg daily  Losartan 100mg daily  Patient reports no current medication side effects.  Patient does not self-monitor blood pressure.       BP Readings from Last 3 Encounters:   11/06/23 (!) 154/86   10/25/23 132/78   10/19/23 (!) 146/85     Pulse Readings from Last 3 Encounters:   11/06/23 102   10/25/23 80   10/19/23 105      Bipolar disorder:   Bupropion  mg daily   Vryalar 4.5mg daily - has been getting this from a  program, unclear if he currently has this or doesn't.   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 Recently saw pain clinic for some issues with his pain pump and feels like his pain has been better since then.     Low testosterone:   Androgel 1% 50mg daily  \"I'm not as compliant with this one\" difficult to apply the topical gel because of his shoulder issues. Wondering what his levels are.     BPH  Tamsulosin 0.4mg daily  No side effects reported    Supplements:   MVI  Calcium carbonate daily (not always taking daily)  Potassium (not always taking daily - helpful for leg cramping)  B complex  Vitamin D3 50mcg daily  Probiotic   Magneisum glycinate  No reported issues at this time.     ----------------  I spent 43 minutes with this patient today. All changes were made via collaborative practice agreement with Sherwin Mcdermott DO. A copy of the visit note was provided " to the patient's provider(s).    A summary of these recommendations was sent via [employer].    Roxann Hdz, PharmD  Medication Therapy Management Resident  Gabino VizcainoD, TOSHIA, BCACP   Medication Therapy Management Pharmacist    Telemedicine Visit Details  Type of service:  Telephone visit  Start Time:  9:00 AM  End Time:  9:43 AM       Medication Therapy Recommendations  Type 2 diabetes mellitus (H)    Rationale: Dose too low - Dosage too low - Effectiveness   Recommendation: Increase Dose - Trulicity 1.5 MG/0.5ML Sopn   Status: Accepted per CPA          Rationale: Medication requires monitoring - Needs additional monitoring - Effectiveness   Recommendation: Self-Monitoring - continuous blood glucose monitoring sensor   Status: Accepted per CPA

## 2023-11-02 NOTE — PATIENT INSTRUCTIONS
"Recommendations from today's MTM visit:                                                    Increase Trulicity to 1.5mg weekly (after you finish your 0.75mg weekly supply).   We'll look into getting you a continuous glucose monitor. This will need to come from [facility] pharmacy.  We'll keep you posted on what we find out (but we believe that this should be covered by Medicare since you are taking one shot of insulin daily).     Follow-up: 11/21/23 at 1:30 pm     It was great speaking with you today.  I value your experience and would be very thankful for your time in providing feedback in our clinic survey. In the next few days, you may receive an email or text message from [facility] [employer] with a link to a survey related to your  clinical pharmacist.\"     To schedule another MTM appointment, please call the clinic directly or you may call the MTM scheduling line at [phone] or toll-free at [phone].     My Clinical Pharmacist's contact information:                                                      Please feel free to contact me with any questions or concerns you have.      Roxann Hdz PharmD  Medication Therapy Management Resident, [facility]  Pager: [phone]  Email: [email or web address]    Minda Smith, Pharm.D., M.B.A., [facility]  Medication Therapy Management Pharmacist, [facility]  Pager: [phone]  Email: [email or web address]    "

## 2023-11-02 NOTE — TELEPHONE ENCOUNTER
Is this a new referral or dose change:  Increase Trulicity 1.5 mg weekly  and new patient  I am unsure if this patient is already established with you     Patient preferred phone number (please verify with pt): [phone]    Additional helpful info for PAP team: N/A    Notes for MTM team:   Route TE to p RXASSIST    New patient referral  PAP team member will contact the patient via phone within 1-2 business days to schedule a medication consult.   PAP team will notify MTM via telephone encounter that a consult is scheduled.   PAP team will document next steps in an Epic telephone encounter, after the consult is completed.   Dose change/refill for current PAP patient  Enter new prescription in Epic (no print-out)  PAP team will document next steps in an Epic telephone encounter

## 2023-11-06 ENCOUNTER — ANCILLARY PROCEDURE (OUTPATIENT)
Dept: GENERAL RADIOLOGY | Facility: CLINIC | Age: 68
End: 2023-11-06
Attending: FAMILY MEDICINE
Payer: COMMERCIAL

## 2023-11-06 ENCOUNTER — OFFICE VISIT (OUTPATIENT)
Dept: FAMILY MEDICINE | Facility: CLINIC | Age: 68
End: 2023-11-06
Payer: COMMERCIAL

## 2023-11-06 VITALS
RESPIRATION RATE: 18 BRPM | WEIGHT: 260 LBS | DIASTOLIC BLOOD PRESSURE: 86 MMHG | TEMPERATURE: 97.5 F | HEIGHT: 66 IN | SYSTOLIC BLOOD PRESSURE: 154 MMHG | BODY MASS INDEX: 41.78 KG/M2 | OXYGEN SATURATION: 94 % | HEART RATE: 102 BPM

## 2023-11-06 DIAGNOSIS — R06.09 CHRONIC DYSPNEA: Primary | ICD-10-CM

## 2023-11-06 DIAGNOSIS — R06.09 CHRONIC DYSPNEA: ICD-10-CM

## 2023-11-06 DIAGNOSIS — E66.01 MORBID OBESITY (H): ICD-10-CM

## 2023-11-06 DIAGNOSIS — G47.33 OBSTRUCTIVE SLEEP APNEA SYNDROME: ICD-10-CM

## 2023-11-06 DIAGNOSIS — G89.4 CHRONIC PAIN SYNDROME: ICD-10-CM

## 2023-11-06 DIAGNOSIS — J18.9 PNEUMONIA OF BOTH LOWER LOBES DUE TO INFECTIOUS ORGANISM: Primary | ICD-10-CM

## 2023-11-06 DIAGNOSIS — I10 BENIGN ESSENTIAL HYPERTENSION: ICD-10-CM

## 2023-11-06 DIAGNOSIS — R53.82 CHRONIC FATIGUE, UNSPECIFIED: ICD-10-CM

## 2023-11-06 DIAGNOSIS — J18.9 PNEUMONIA OF BOTH LOWER LOBES DUE TO INFECTIOUS ORGANISM: ICD-10-CM

## 2023-11-06 DIAGNOSIS — E11.69 TYPE 2 DIABETES MELLITUS WITH OTHER SPECIFIED COMPLICATION, WITHOUT LONG-TERM CURRENT USE OF INSULIN (H): ICD-10-CM

## 2023-11-06 PROCEDURE — 99215 OFFICE O/P EST HI 40 MIN: CPT | Performed by: FAMILY MEDICINE

## 2023-11-06 PROCEDURE — 71046 X-RAY EXAM CHEST 2 VIEWS: CPT | Mod: TC | Performed by: RADIOLOGY

## 2023-11-06 RX ORDER — RESPIRATORY SYNCYTIAL VIRUS VACCINE 120MCG/0.5
0.5 KIT INTRAMUSCULAR ONCE
Qty: 1 EACH | Refills: 0 | Status: CANCELLED | OUTPATIENT
Start: 2023-11-06 | End: 2023-11-06

## 2023-11-06 ASSESSMENT — PAIN SCALES - GENERAL: PAINLEVEL: MILD PAIN (2)

## 2023-11-06 NOTE — PROGRESS NOTES
Assessment & Plan     Pneumonia of both lower lobes due to infectious organism  I recommended that we recheck a chest x-ray today.  I personally reviewed the images and compared them with the chest x-ray he had on 10/19/2023 when he was diagnosed with pneumonia.  The chest x-ray images from today do not show any obvious infiltrates or effusions.  The formal radiology report is pending.  It is reassuring that he is no longer feeling ill and he already finished a course of Levaquin last month.  I recommended he simply monitor symptoms for now and explained that it is not uncommon to have occasional coughing spells after recovering from pneumonia  - XR Chest 2 Views; Future    Morbid obesity (H)  He will continue to work on lifestyle modifications to help with weight loss.  It is possible that this issue is contributing to the chronic dyspnea symptoms he has been experiencing.    Benign essential hypertension  His blood pressure is running high today, but he has not taken any of his medications yet.    Type 2 diabetes mellitus with other specified complication, without long-term current use of insulin (H)  I recommended he continue his current medications.    Obstructive sleep apnea syndrome  I encouraged him to start using CPAP again    Chronic pain syndrome  He will continue following closely with his pain specialist.    Chronic fatigue, unspecified  I recommended that he start using CPAP again as untreated NORMA is likely contributing to this issue.    Chronic dyspnea  He was given a referral to pulmonology for further evaluation of his chronic dyspnea symptoms.  - Adult Pulmonary Medicine  Referral; Future                 Sherwin Mcdermott DO  [facility]    Lavinia Almendarez is a 68 year old, presenting for the following health issues:  Pain        11/6/2023    10:29 AM   Additional Questions   Roomed by Xavier RUIZ       History of Present Illness       Back Pain:  He presents for  follow up of back pain. Patient's back pain is a chronic problem.  Location of back pain:  Right middle of back, left middle of back, left shoulder, right hip and other  Description of back pain: dull ache and sharp  Back pain spreads: nowhere    Since patient first noticed back pain, pain is: always present, but gets better and worse  Does back pain interfere with his job:  Yes       Mental Health Follow-up:  Patient presents to follow-up on Anxiety.    Patient's anxiety since last visit has been:  Medium  The patient is not having other symptoms associated with anxiety.  Any significant life events: job concerns and financial concerns  Patient is not feeling anxious or having panic attacks.  Patient has no concerns about alcohol or drug use.    Diabetes:   He presents for follow up of diabetes.  He is checking home blood glucose one time daily.   He checks blood glucose before meals.  Blood glucose is sometimes over 200 and never under 70. He is aware of hypoglycemia symptoms including shakiness, dizziness and weakness.   He is concerned about blood sugar frequently over 200.   He is having numbness in feet, excessive thirst and weight gain.            He eats 0-1 servings of fruits and vegetables daily.He consumes 0 sweetened beverage(s) daily.He exercises with enough effort to increase his heart rate 10 to 19 minutes per day.  He exercises with enough effort to increase his heart rate 3 or less days per week. He is missing 2 dose(s) of medications per week.  He is not taking prescribed medications regularly due to cost of medication and remembering to take.     Pneumonia Hospital admission, wants to talk about it:    On 10/19/2023 he was seen in the urgent care for a cough that was productive with green phlegm.  They checked an x-ray which showed mild patchy bibasilar opacities.  He was treated with Levaquin for pneumonia.  When I saw him for follow-up on 10/25/2023 he reported that symptoms were improved,  "although he still had a lingering cough.    He reports that he thinks that the pneumonia may still be present.  He still coughs about once a day and can feel some chest congestion.  He is not having fevers.  He is not acutely short of breath.  He has a complex medical history significant for morbid obesity, hypertension, type 2 diabetes, chronic pain syndrome and obstructive sleep apnea.  He admits that he frequently feels short of breath and he is chronically fatigued.  He denies wheezing symptoms.  He has a history of obstructive sleep apnea, but has not been using his CPAP machine.                Review of Systems   Constitutional, HEENT, cardiovascular, pulmonary, GI, , musculoskeletal, neuro, skin, endocrine and psych systems are negative, except as otherwise noted.      Objective    BP (!) 154/86   Pulse 102   Temp 97.5  F (36.4  C) (Temporal)   Resp 18   Ht 1.676 m (5' 6\")   Wt 117.9 kg (260 lb)   SpO2 94%   BMI 41.97 kg/m    Body mass index is 41.97 kg/m .  Physical Exam   GENERAL: healthy, appears moderately fatigued and no distress  EYES: Eyes grossly normal to inspection, PERRL and conjunctivae and sclerae normal  HENT:  nose and mouth without ulcers or lesions  NECK: no adenopathy, no asymmetry, masses, or scars and thyroid normal to palpation  RESP: lungs clear to auscultation - no rales, rhonchi or wheezes.  He coughs a couple times during the appointment.  CV: regular rate and rhythm, normal S1 S2, no S3 or S4, no murmur, click or rub, no peripheral edema and peripheral pulses strong  ABDOMEN: soft, nontender, no hepatosplenomegaly, no masses and bowel sounds normal  MS: no gross musculoskeletal defects noted, no edema  SKIN: no suspicious lesions or rashes  NEURO: Normal strength and tone, mentation intact and speech normal  PSYCH: mentation appears normal, affect normal                      "

## 2023-11-08 ENCOUNTER — PATIENT OUTREACH (OUTPATIENT)
Dept: GASTROENTEROLOGY | Facility: CLINIC | Age: 68
End: 2023-11-08
Payer: COMMERCIAL

## 2023-11-08 DIAGNOSIS — Z12.11 SPECIAL SCREENING FOR MALIGNANT NEOPLASMS, COLON: Primary | ICD-10-CM

## 2023-11-08 NOTE — PROGRESS NOTES
"CRC Screening Colonoscopy Referral Review    Patient meets the inclusion criteria for screening colonoscopy standing order.    Ordering/Referring Provider:  Sherwin Mejia      BMI: Estimated body mass index is 41.97 kg/m  as calculated from the following:    Height as of 11/6/23: 1.676 m (5' 6\").    Weight as of 11/6/23: 117.9 kg (260 lb).     Sedation:  Does patient have any of the following conditions affecting sedation?  Hx of chemical dependency: MAC sedation recommended    Previous Scopes:  Any previous recommendations or follow up needs based on previous scope?  na / No recommendations.    Medical Concerns to Postpone Order:  Does patient have any of the following medical concerns that should postpone/delay colonoscopy referral?  No medical conditions affecting colonoscopy referral.    Final Referral Details:  Based on patient's medical history patient is appropriate for referral order with MAC/deep sedation.   BMI<= 45 45 < BMI <= 48 48 < BMI < = 50  BMI > 50   No Restrictions No [facility]  No [facility]  [facility] with exceptions Hospital Only OR Only     "

## 2023-11-09 ENCOUNTER — NURSE TRIAGE (OUTPATIENT)
Dept: FAMILY MEDICINE | Facility: CLINIC | Age: 68
End: 2023-11-09
Payer: COMMERCIAL

## 2023-11-09 DIAGNOSIS — R06.2 WHEEZING: Primary | ICD-10-CM

## 2023-11-09 RX ORDER — ALBUTEROL SULFATE 90 UG/1
2 AEROSOL, METERED RESPIRATORY (INHALATION) EVERY 6 HOURS PRN
Qty: 18 G | Refills: 0 | Status: SHIPPED | OUTPATIENT
Start: 2023-11-09 | End: 2024-08-19

## 2023-11-09 NOTE — TELEPHONE ENCOUNTER
Patient updated  Verbalized understanding  Plans to  the inhaler today and try  Will call back or go to urgent care if new or worsening symptoms  Divina AGRAWAL RN

## 2023-11-09 NOTE — TELEPHONE ENCOUNTER
Please contact this patient and let him know that I recommend he try using an albuterol inhaler to help with the wheezing symptoms.  I sent a prescription for 1 to the [facility] in [place].  It is very reassuring that the chest x-ray results from a couple of days ago show that the pneumonia had cleared.  If the albuterol is not helping then it would be a good idea for him to be seen for an office visit either here in clinic or at an urgent care.  Thank you, DE

## 2023-11-09 NOTE — TELEPHONE ENCOUNTER
Nurse Triage SBAR    Is this a 2nd Level Triage? NO    Situation:   Patient calling reporting wheezing when coughing or deep breathing  Requesting another dose of levoflaxacin and/or steroid if possible  Reports he is also having lower back pain near site of pain pump  Had a myelogram Tuesday     Background:   Recovering from pneumonia  Had OV 11/6/23, was feeling much better    Assessment:   Reports wheezing started Tuesday  Pt speaking in long full sentences on the phone    Protocol Recommended Disposition:   See PCP Within 24 Hours, Go To Office Now    Recommendation:   Advised to call pain clinic to discuss recommendations to address pain pump concern  Patient declined an in person appointment at [facility]   States it is difficult for him to go anywhere right now due to chronic pain and mobility issues  Please advise on next steps  Could schedule for VV today with covering provider?  Thanks,  Divina AGRAWAL RN      Routed to provider    Does the patient meet one of the following criteria for ADS visit consideration? 16+ years old, with an [facility] PCP     TIP  Providers, please consider if this condition is appropriate for management at one of our Acute and Diagnostic Services sites.     If patient is a good candidate, please use dotphrase <dot>triageresponse and select Refer to ADS to document.    Reason for Disposition   MILD difficulty breathing (e.g., minimal/no SOB at rest, SOB with walking, pulse < 100) of new-onset or worse than normal   [1] Continuous (nonstop) coughing interferes with work or school AND [2] no improvement using cough treatment per Care Advice    Additional Information   Negative: SEVERE difficulty breathing (e.g., struggling for each breath, speaks in single words, pulse > 120)   Negative: Breathing stopped and hasn't returned   Negative: Choking on something   Negative: Bluish (or gray) lips or face   Negative: Difficult to awaken or acting confused (e.g., disoriented, slurred speech)    "Negative: Passed out (i.e., fainted, collapsed and was not responding)   Negative: Wheezing started suddenly after medicine, an allergic food, or bee sting   Negative: Stridor (harsh sound while breathing in)   Negative: Slow, shallow and weak breathing   Negative: Sounds like a life-threatening emergency to the triager   Negative: Chest pain   Negative: Wheezing (high pitched whistling sound) and previous asthma attacks or use of asthma medicines   Negative: Difficulty breathing and within 14 days of COVID-19 Exposure   Negative: Difficulty breathing and only present when coughing   Negative: Difficulty breathing and only from stuffy nose   Negative: Difficulty breathing and only from stuffy nose or runny nose from common cold   Negative: MODERATE difficulty breathing (e.g., speaks in phrases, SOB even at rest, pulse 100-120) of new-onset or worse than normal   Negative: Oxygen level (e.g., pulse oximetry) 90% or lower   Negative: Wheezing can be heard across the room   Negative: Drooling or spitting out saliva (because can't swallow)   Negative: Any history of prior \"blood clot\" in leg or lungs   Negative: Illness requiring prolonged bedrest in past month (e.g., immobilization, long hospital stay)   Negative: Hip or leg fracture (broken bone) in past month (or had cast on leg or ankle in past month)   Negative: Major surgery in the past month   Negative: Long-distance travel in past month (e.g., car, bus, train, plane; with trip lasting 6 or more hours)   Negative: Cancer treatment in past six months (or has cancer now)   Negative: Extra heartbeats, irregular heart beating, or heart is beating very fast (i.e., 'palpitations')   Negative: Fever > 103 F (39.4 C)   Negative: Fever > 101 F (38.3 C) and over 60 years of age   Negative: Fever > 100.0 F (37.8 C) and bedridden (e.g., nursing home patient, stroke, chronic illness, recovering from surgery)   Negative: Fever > 100.0 F (37.8 C) and diabetes mellitus or weak " immune system (e.g., HIV positive, cancer chemo, splenectomy, organ transplant, chronic steroids)   Negative: Periods where breathing stops and then resumes normally and bedridden (e.g., nursing home patient, CVA)   Negative: Pregnant or postpartum (from 0 to 6 weeks after delivery)   Negative: Patient sounds very sick or weak to the triager   Negative: SEVERE difficulty breathing (e.g., struggling for each breath, speaks in single words)   Negative: Bluish (or gray) lips or face now   Negative: Difficult to awaken or acting confused (e.g., disoriented, slurred speech)   Negative: Stridor (harsh sound while breathing in)   Negative: Slow, shallow and weak breathing   Negative: Sounds like a life-threatening emergency to the triager   Negative: Recently (< 1 month) discharged from hospital with diagnosis of pneumonia   Negative: New-onset or worsening chest pain   Negative: Oxygen level (e.g., pulse oximetry) 90 percent or lower   Negative: MODERATE difficulty breathing (e.g., speaks in phrases, SOB even at rest, pulse 100-120)   Negative: Fever > 104 F (40 C)   Negative: [1] Taking antibiotic > 24 hours for pneumonia AND [2] fever > 103 F (39.4 C)   Negative: [1] Coughed up blood AND [2] > 1 tablespoon (15 ml)   (Exception: Blood-tinged sputum.)   Negative: Patient sounds very sick or weak to the triager   Negative: [1] Diabetes mellitus or weak immune system (e.g., HIV positive, cancer chemo, splenectomy, organ transplant, chronic steroids) AND [2] new-onset of fever > 100.0 F (37.8 C)   Negative: [1] Taking antibiotic > 24 hours for pneumonia AND [2] breathing is worse   Negative: [1] Recent medical visit within 24 hours AND [2] symptoms worse  (Exception: Fever wors.e)   Negative: Oxygen level (e.g., pulse oximetry) 91 to 94 percent   Negative: [1] Taking antibiotic > 24 hours for pneumonia AND [2] new-onset of fever   Negative: [1] Taking antibiotic > 48 hours (2 days) for pneumonia AND [2] fever persists or  recurs   Negative: [1] Taking antibiotic > 48 hours (2 days) for pneumonia AND [2] breathing not improved   Negative: [1] Viral pneumonia (no antibiotic) AND [2] fever persists > 3 days or recurs    Protocols used: Breathing Difficulty-A-[place], Pneumonia Follow-up Call-A-

## 2023-11-12 ENCOUNTER — NURSE TRIAGE (OUTPATIENT)
Dept: NURSING | Facility: CLINIC | Age: 68
End: 2023-11-12
Payer: COMMERCIAL

## 2023-11-12 NOTE — TELEPHONE ENCOUNTER
"Pt calling that he has been running high blood sugars for the past couple of months.  Pt started on Trulicity and can't afford the co-pay of $200.  Pt states he is a fall risk, and dizzy and foggy.  Pt O2 Sat 80's% and BP 72/52 and 97/57.  No chest pain or shortness of breath.  Pt transferred to  for OV appt for tomorrow 11/13/23.    Jory Villalta RN  [facility] Nurse Advisor    Reason for Disposition   [1] Systolic BP  AND [2] taking blood pressure medications AND [3] NOT dizzy, lightheaded or weak    Additional Information   Negative: Started suddenly after an allergic medicine, an allergic food, or bee sting   Negative: Shock suspected (e.g., cold/pale/clammy skin, too weak to stand, low BP, rapid pulse)   Negative: Difficult to awaken or acting confused (e.g., disoriented, slurred speech)   Negative: Fainted   Negative: [1] Systolic BP < 90 AND [2] dizzy, lightheaded, or weak   Negative: Chest pain   Negative: Bleeding (e.g., vomiting blood, rectal bleeding or tarry stools, severe vaginal bleeding)(Exception: fainted from sight of small amount of blood; small cut or abrasion)   Negative: Extra heart beats or heart is beating fast  (i.e., \"palpitations\")   Negative: Sounds like a life-threatening emergency to the triager   Negative: [1] Systolic BP < 80 AND [2] NOT dizzy, lightheaded or weak   Negative: Abdominal pain   Negative: Fever > 100.4 F (38.0 C)   Negative: Major surgery in the past month   Negative: [1] Drinking very little AND [2] dehydration suspected (e.g., no urine > 12 hours, very dry mouth, very lightheaded)   Negative: [1] Fall in systolic BP > 20 mm Hg from normal AND [2] dizzy, lightheaded, or weak   Negative: Patient sounds very sick or weak to the triager   Negative: [1] Systolic BP < 90 AND [2] NOT dizzy, lightheaded or weak   Negative: [1] Systolic BP  AND [2] taking blood pressure medications AND [3] dizzy, lightheaded or weak    Protocols used: Low Blood Pressure-A-AH    "

## 2023-11-14 ENCOUNTER — TELEPHONE (OUTPATIENT)
Dept: WOUND CARE | Facility: CLINIC | Age: 68
End: 2023-11-14
Payer: COMMERCIAL

## 2023-11-14 NOTE — TELEPHONE ENCOUNTER
Left voicemail for patient stating that there is an opening today with Dr. Nash at 2:20 and to call clinic back when able.

## 2023-11-14 NOTE — TELEPHONE ENCOUNTER
Patient left a  returning call from Trudy Reyes RN but patient had not yet received the message about the opening for today 11/14/23. Writer returned call to patient and informed him of the opening but patient is unavailable today. Patient is now scheduled for tomorrow 11/15/23 at 3pm.

## 2023-11-14 NOTE — TELEPHONE ENCOUNTER
Former [facility] patient is asking to be seen for a reoccurrence of the calloused area on his foot that has opened. He is concerned about cellulitis.

## 2023-11-15 ENCOUNTER — HOSPITAL ENCOUNTER (OUTPATIENT)
Dept: WOUND CARE | Facility: CLINIC | Age: 68
Discharge: HOME | End: 2023-11-15
Attending: FAMILY MEDICINE
Payer: COMMERCIAL

## 2023-11-15 VITALS
DIASTOLIC BLOOD PRESSURE: 87 MMHG | SYSTOLIC BLOOD PRESSURE: 163 MMHG | TEMPERATURE: 97.2 F | RESPIRATION RATE: 20 BRPM | HEART RATE: 84 BPM

## 2023-11-15 DIAGNOSIS — R60.0 BILATERAL LEG EDEMA: Primary | ICD-10-CM

## 2023-11-15 PROCEDURE — 99213 OFFICE O/P EST LOW 20 MIN: CPT | Performed by: FAMILY MEDICINE

## 2023-11-15 NOTE — PROGRESS NOTES
Wound Clinic Note         Visit date: 11/15/2023       Cheif Complaint:     Parmjit Hernández is a 68 year old   male had concerns including WOUND CARE.  The patient has a right first toe callus.         HISTORY OF PRESENT ILLNESS:    Parmjit Hernández has a callus on his right first toe.    I last saw this patient in the wound clinic on June 1, 2023 at which time his left leg wound was scabbed over.  He reports today that that has remained healed and has had no wounds on his legs recently.  He is come back into the wound clinic because of a callus on his right first toe.  There is been no drainage from the area and he does not apply any bandages to the area.    He has been wearing edema wear stockings on his legs to control his swelling.      The pateint denies fevers or chills.  They report the pain from the wound has been 0/10 and has remained about the same recently.      The patient reports they currently do not have any routine for elevating their legs.     Today the patient reports maintaining a regular diet without special attention to protein.        The patient denies a history of diabetes, smoking or chronic steroid use.         The patient has not had any symptoms of infection relating to the wound recently and is not currently on antibiotics.       Problem List:   Past Medical History:   Diagnosis Date    Acquired lymphedema 2/4/2019    Allergic state     Amyloidosis (H)     followed by hematology Dr. Romeo status post peripheral stem cell transplant    Anxiety 5/11/2016    Anxiety and depression 12/18/2013    Bipolar I disorder (H) 9/1/2015    Under care of psychiatrist NEISHA- Dr Rahman doxepin 25 mg, 2 cap bedtime DULoxetine 20 mg once daily  OLANZapine 7.5 mg  1 tab bedtime     DDD (degenerative disc disease), lumbar 8/6/2020    Depressive disorder 1995    EKG abnormality 4/20/2020    H/O bone marrow transplant (H)     Headache(784.0)     History of diverticulitis 1/27/2015    1/15-rxed with  antibiotics     Hyperlipidemia LDL goal <100 10/31/2010    Hypertension 2007    Hypertension goal BP (blood pressure) < 140/90 10/11/2011    Marianne (H) 8/20/2015    Morbid obesity (H) 8/28/2018    Myalgia and myositis, unspecified     Narcotic dependence (H) 9/6/2016    None in about 6 months- 8/1/17    NSTEMI (non-ST elevated myocardial infarction) (H) 04/19/2020    OCD (obsessive compulsive disorder)     Other acquired absence of organ 94    Other cirrhosis of liver (H) possibly from vences  4/15/2020    Other specified viral warts     Peripheral edema 5/20/2018    Noncardiac Continue with  furosemide (LASIX) 20 MG tablet,  potassium       chloride SA (K-DUR/KLOR-CON M) 10 MEQ CR  Tab once daily  Basic metabolic panel    Polyneuropathy associated with underlying disease (H24) 2/4/2019    Restless legs syndrome (RLS) 6/29/2017    Stasis dermatitis of both legs 12/31/2018    Type 2 diabetes mellitus with other specified complication (H) 7/10/2017             Family Hx: family history includes Alcohol/Drug in his father, maternal grandmother, mother, and paternal grandfather; Allergies in his father and sister; Arthritis in his mother; Asthma in his father; C.A.D. in his father, maternal grandmother, and mother; Cerebrovascular Disease in his maternal grandfather, maternal grandmother, mother, paternal grandfather, and paternal grandmother; Circulatory in his father; Depression in his daughter, father, sister, and son; Diabetes in his brother; Gynecology in his sister; Heart Disease in his father; Hypertension in his father and mother; Psychotic Disorder in his son; Respiratory in his father.       Surgical Hx:   Past Surgical History:   Procedure Laterality Date    ABDOMEN SURGERY  2007    abdominal hernia    BACK SURGERY  8/6/2020    BIOPSY  june 2015    negative    BMT PROTOCOL      for systemic amyloidosis    BONE MARROW BIOPSY, BONE SPECIMEN, NEEDLE/TROCAR N/A 6/8/2016    Procedure: BIOPSY BONE MARROW;  Surgeon:  Nathan Agrawal MD;  Location:  GI    BONE MARROW BIOPSY, BONE SPECIMEN, NEEDLE/TROCAR N/A 2/20/2019    Procedure: BIOPSY BONE MARROW;  Surgeon: Michael Raygoza MD;  Location:  GI    CHOLECYSTECTOMY  1995    lap qian    COLONOSCOPY  2012    hx polyps    ESOPHAGOSCOPY, GASTROSCOPY, DUODENOSCOPY (EGD), COMBINED N/A 5/29/2015    Procedure: COMBINED ESOPHAGOSCOPY, GASTROSCOPY, DUODENOSCOPY (EGD), BIOPSY SINGLE OR MULTIPLE;  Surgeon: John Jacob MD;  Location:  GI    HERNIA REPAIR, UMBILICAL  2006    [facility] NONSPECIFIC PROCEDURE  94    Cholecystectomy    [facility] NONSPECIFIC PROCEDURE  2000    repair deviated septum          Allergies:    Allergies   Allergen Reactions    Amoxicillin Diarrhea    Cephalexin Diarrhea    Liraglutide Other (See Comments), Headache and Unknown     Other reaction(s): Headache, Unknown  PN: migraines - Increased migraine frequency and severity      Sulfa Antibiotics Angioedema and Swelling     Pt has taken  Taken sulfa drugs orally without trouble. He had problems with Sulfa eye drops. Eye swelled up                Medication History:    Current Outpatient Medications   Medication Sig    acetaminophen (TYLENOL) 500 MG tablet Take 1,000 mg by mouth every 8 hours as needed    albuterol (PROAIR HFA/PROVENTIL HFA/VENTOLIN HFA) 108 (90 Base) MCG/ACT inhaler Inhale 2 puffs into the lungs every 6 hours as needed for shortness of breath, wheezing or cough    ammonium lactate (AMLACTIN) 12 % external cream Apply topically daily To feet and legs daily & as needed    aspirin (ASPIRIN LOW DOSE) 81 MG tablet Take 1 tablet (81 mg) by mouth daily    atorvastatin (LIPITOR) 20 MG tablet Take 1.5 tablets (30 mg) by mouth daily    Bioflavonoid Products (CANDIDO-C) TABS Take 1,000 mg by mouth daily    blood glucose (NO BRAND SPECIFIED) lancets standard Use to test blood sugar 1 time daily or as directed.    blood glucose (NO BRAND SPECIFIED) test strip Use to test blood sugar 1 time  daily or as directed.    buPROPion (WELLBUTRIN XL) 150 MG 24 hr tablet Take 1 tablet (150 mg) by mouth every morning    calcium carbonate (OS-LUIS) 1500 (600 Ca) MG tablet Take 600 mg by mouth daily    cariprazine (VRAYLAR) 4.5 MG capsule Take 1 capsule (4.5 mg) by mouth daily    clobetasol (TEMOVATE) 0.05 % external cream Apply topically 2 times daily For psoriasis on lower body    Continuous Blood Gluc  (FREESTYLE VERA 2 READER) MARAL Use to read blood sugars as per 's instructions.    Continuous Blood Gluc Sensor (FREESTYLE VERA 2 SENSOR) MISC Change every 14 days.    cyclobenzaprine (FLEXERIL) 10 MG tablet Take 1 tablet (10 mg) by mouth 3 times daily as needed for muscle spasms    dulaglutide (TRULICITY) 1.5 MG/0.5ML pen Inject 1.5 mg Subcutaneous every 7 days    DULoxetine (CYMBALTA) 30 MG capsule Take 1 capsule (30 mg) by mouth 2 times daily    Folic Acid-Vit B6-Vit B12 0.5-5-0.2 MG TABS Take 1 tablet by mouth daily    glucose 40 % (400 mg/mL) gel Take by mouth every 15 minutes as needed for low blood sugar    insulin glargine (LANTUS PEN) 100 UNIT/ML pen Inject 40 Units Subcutaneous at bedtime    insulin pen needle (31G X 8 MM) 31G X 8 MM miscellaneous Use one pen needles daily or as directed.    loperamide (IMODIUM) 2 MG capsule Take 1 capsule (2 mg) by mouth 4 times daily as needed for diarrhea    losartan (COZAAR) 100 MG tablet Take 1 tablet (100 mg) by mouth daily    medication given by implanted intrathecal pump continuous Drug # 1: Fentanyl (Sublimaze) - Conc: 2000 mcg/mL - Total Dose / 24 hours: 1663.3 mcg  Drug # 2: Bupivacaine (Marcaine)  - Conc: 20 mg/mL - Total Dose / 24 hours: 16.633 mg  Drug # 3: Morphine (Duramorph or Infumorph)  - Conc: 9 mg/mL - Total Dose / 24 hours: 7.484 mg    Rate: 0.0325 mL/hr  Pump Reservoir Volume: 40 mL  Pump Reservoir Alarm Volume: 2 ml  Outside Clinic & Provider: [facility]  Last Refill Date: 5/18/2023  Next Refill Date:  6/25/2023  [place] Alarm Date:  Pump : Medtronic SynchroMed II    Boluses: Up to 3 per day, 4 minute duration. Lock-out every 6 hours  Fentanyl: 110 mcg/dose  Bupivacaine: 1.1 mg/dose  Morphine: 0.49 mg/dose    metFORMIN (GLUCOPHAGE XR) 500 MG 24 hr tablet Take 2 tablets (1,000 mg) by mouth 2 times daily (with meals)    metoprolol succinate ER (TOPROL XL) 100 MG 24 hr tablet Take 1 tablet (100 mg) by mouth daily    modafinil (PROVIGIL) 200 MG tablet Take 2 tablets (400 mg) by mouth daily Pt has increased his dose to 400mg daily from 300mg daily.  Has a message in to his physician for a dose increase.    mometasone (ELOCON) 0.1 % external cream Apply topically daily For psoriasis on face    multivitamin w/minerals (THERA-VIT-M) tablet Take 1 tablet by mouth daily Men's 50+    nitroGLYcerin (NITROSTAT) 0.4 MG sublingual tablet Place 1 tablet (0.4 mg) under the tongue every 5 minutes as needed for chest pain For chest pain place 1 tablet under the tongue every 5 minutes for 3 doses. If symptoms persist 5 minutes after 1st dose call 911.    OVER-THE-COUNTER Take 3 capsules by mouth daily Supplement for brain health, unknown brand/ingredients    POTASSIUM PO Take 1 tablet by mouth daily Unspecified tablet strength; patient estimated 600 mg, takes for leg cramps    pregabalin (LYRICA) 100 MG capsule Take 1 capsule (100 mg) by mouth daily as needed (In addition to 100mg 3 times per day scheduled)    pregabalin (LYRICA) 100 MG capsule Take 1 capsule (100 mg) by mouth 3 times daily    senna-docusate (SENOKOT-S/PERICOLACE) 8.6-50 MG tablet Take 1-2 tablets by mouth 2 times daily as needed    SUMAtriptan (IMITREX) 50 MG tablet Take 1 tablet (50 mg) by mouth at onset of headache for migraine May repeat in 2 hours. Max 4 tablets/24 hours.    tamsulosin (FLOMAX) 0.4 MG capsule Take 1 capsule (0.4 mg) by mouth 2 times daily    testosterone (ANDROGEL/TESTIM) 50 MG/5GM (1%) topical gel Place 1 packet (50 mg of  testosterone) onto the skin daily    vitamin B-Complex Take 3 tablets by mouth daily    vitamin D3 (CHOLECALCIFEROL) 50 mcg (2000 units) tablet Take 1 tablet (50 mcg) by mouth daily     No current facility-administered medications for this encounter.         Tobacco History:  reports that he has never smoked. He has never used smokeless tobacco.       REVIEW OF SYMPTOMS:   The review of systems was negative except as noted in the HPI.           PHYSICAL EXAMINATION:     BP (!) 163/87 (BP Location: Left arm, Patient Position: Chair, Cuff Size: Adult Regular)   Pulse 84   Temp 97.2  F (36.2  C) (Temporal)   Resp 20            GENERAL: The patient overall appears well and is no acute distress.   HEAD: normocephalic   EYES: Sclera and conjunctiva clear   NECK: no obvious masses   LUNGS: breathing is unlabored.   EXTREMITIES: No clubbing, cyanosis or edema   SKIN: No rashes or other abnormalities except as noted under the Wound section below.   NEUROLOGICAL: normal motor and sensory function   EDEMA: Moderate       WOUND: He has no open wounds on his lower extremities.      Also see below for wound details:       Circumferential volume measures:             No data to display                Ulceration(s)/Wound(s):   Please see the media tab under the chart review for pictures of the wounds.  Nursing staff removed dressings and cleansed wound.    Wound (used by OP [facility] only) 03/16/23 0911 Right 1 toe neuropathic ulcer (Active)   Thickness/Stage full thickness 11/15/23 1512   Base dry 11/15/23 1512   Periwound dry;other (see comments) 11/15/23 1512   Periwound Temperature warm 11/15/23 1512   Periwound Skin Turgor firm 11/15/23 1512   Edges callused 11/15/23 1512   Length (cm) 0 11/15/23 1512   Width (cm) 0 11/15/23 1512   Depth (cm) 0 11/15/23 1512   Wound (cm^2) 0 cm^2 11/15/23 1512   Wound Volume (cm^3) 0 cm^3 11/15/23 1512   Wound healing % 100 11/15/23 1512   Drainage Amount none 11/15/23 1512   Care, Wound  non-select wound debridement performed. 11/15/23 1512               Recent Labs   Lab Test 05/12/23  0634 02/23/23  1227 05/07/21  0741   A1C 8.8* 9.3* 5.8*          Recent Labs   Lab Test 05/31/23  1124 05/30/23  1510 05/19/23  0949   ALBUMIN 3.7 4.3 3.9              No debridement performed today.                 ASSESSMENT:   This is a 68 year old  male with a left leg ulcer.           PLAN:   I recommended that he make an appointment with a podiatrist for ongoing foot care.  I explained to the patient today that controlling the edema is probably the most important thing we can do to help heal the wound.  I have specially recommended that they lay down with their legs above the level of the heart for 30 minutes at least twice a day.   I have explained to the patient the importance of protein intake to wound healing.  I have explained that increasing protein intake will speed wound healing.  We discussed several types of food that are high in protein and the wound care nurse gave the patient a handout that summarizes this information.  In addition to further speed wound healing I have encouraged the patient to take a protein supplement.   The patient will return to the wound clinic on an as-needed basis if further wounds develop.   Please do not schedule this patient to come back to the wound clinic unless he has an open and draining wound.      20 minutes spent on the date of the encounter doing chart review, history and exam, documentation and further activities per the note, this time excludes any procedure time    Copied text has been reviewed and appropriate changes were made.     Jose Nash MD  11/15/2023   3:25 PM   [facility] Vascular/Wound  [phone]    This note was electronically signed by Jose Nash MD        Further instructions from your care team         Parmjit Hernández      1955    A DME order was not completed because supplies were not needed    Dressing  changes outside of clinic are being performed by Patient      PLAN:  Watch for signs/symptoms of infection (pain, redness, swelling, pus, drainage, fevers/chills)  Please raise your legs above your hips for 30 mins 2 times a day to promote wound healing and decrease swelling.  Walk as much as you can. When you sit raise your ankle above your hips to promote wound healing.   Continue to wear EdemaWear and/or compression socks to manage your lower leg swelling.  Follow up with PODIATRIST for your calluses  Return to Clinic if anything opens on your legs    PODIATRY CLINICS:    [address] [address] ([facility]) [phone]    [address] [address] ([facility])   [phone]     [address] [address] ([facility]) [phone]         Wound Dressing Change: Left Medial Toe 1 - No Open Wound present  - Wash your hands with soap and water before you begin your dressing change and prepare a clean surface for dressings.  - Cleanse with mild unscented soap (such as Cetaphil, Cerave or Dove) and water  - Ok to continue to apply small amount of VASHE on gauze, lay into wound bed, let sit for 10 minutes, remove gauze (do not rinse)  - OK to continue to apply Urea Lotion to callused skin  FOLLOW UP WITH PODIATRIST          Jose Nash M.D. November 15, 2023    Call us at [phone] if you have any questions about your wounds, have redness or swelling around your wound, have a fever of 101 degrees Fahrenheit or greater or if you have any other problems or concerns. We answer the phone Monday through Friday 8 am to 4 pm, please leave a message as we check the voicemail frequently throughout the day.     If you had a positive experience please indicate that on your patient satisfaction survey form that [facility] will be sending  you.    It was a pleasure meeting with you today.  Thank you for allowing me and my team the privilege of caring for you today.  YOU are the reason we are here, and I truly hope we provided you with the excellent service you deserve.  Please let us know if there is anything else we can do for you so that we can be sure you are leaving completely satisfied with your care experience.      If you have any billing related questions please call the [facility] Business office at [phone]. The clinic staff does not handle billing related matters.    If you are scheduled to have a follow up appointment, you will receive a reminder call the day before your visit. On the appointment day please arrive 15 minutes prior to your appointment time. If you are unable to keep that appointment, please call the clinic to cancel or reschedule. If you are more than 10 minutes late or greater for your scheduled appointment time, the clinic policy is that you may be asked to reschedule.         ,

## 2023-11-15 NOTE — DISCHARGE INSTRUCTIONS
Parmjit Hernández      1955    A DME order was not completed because supplies were not needed    Dressing changes outside of clinic are being performed by Patient      PLAN:  Watch for signs/symptoms of infection (pain, redness, swelling, pus, drainage, fevers/chills)  Please raise your legs above your hips for 30 mins 2 times a day to promote wound healing and decrease swelling.  Walk as much as you can. When you sit raise your ankle above your hips to promote wound healing.   Continue to wear EdemaWear and/or compression socks to manage your lower leg swelling.  Follow up with PODIATRIST for your calluses  Return to Clinic if anything opens on your legs    PODIATRY CLINICS:    [address] [address] ([facility]) [phone]    [address] [address] ([facility])   [phone]     [address] [address] ([facility]) [phone]         Wound Dressing Change: Left Medial Toe 1 - No Open Wound present  - Wash your hands with soap and water before you begin your dressing change and prepare a clean surface for dressings.  - Cleanse with mild unscented soap (such as Cetaphil, Cerave or Dove) and water  - Ok to continue to apply small amount of VASHE on gauze, lay into wound bed, let sit for 10 minutes, remove gauze (do not rinse)  - OK to continue to apply Urea Lotion to callused skin  FOLLOW UP WITH PODIATRIST          Jose Nash M.D. November 15, 2023    Call us at [phone] if you have any questions about your wounds, have redness or swelling around your wound, have a fever of 101 degrees Fahrenheit or greater or if you have any other problems or concerns. We answer the phone Monday through Friday 8 am to 4 pm, please leave a message as we check the voicemail frequently throughout the day.     If you had a positive experience  please indicate that on your patient satisfaction survey form that [facility] will be sending you.    It was a pleasure meeting with you today.  Thank you for allowing me and my team the privilege of caring for you today.  YOU are the reason we are here, and I truly hope we provided you with the excellent service you deserve.  Please let us know if there is anything else we can do for you so that we can be sure you are leaving completely satisfied with your care experience.      If you have any billing related questions please call the [facility] Business office at [phone]. The clinic staff does not handle billing related matters.    If you are scheduled to have a follow up appointment, you will receive a reminder call the day before your visit. On the appointment day please arrive 15 minutes prior to your appointment time. If you are unable to keep that appointment, please call the clinic to cancel or reschedule. If you are more than 10 minutes late or greater for your scheduled appointment time, the clinic policy is that you may be asked to reschedule.

## 2023-11-22 ENCOUNTER — TRANSFERRED RECORDS (OUTPATIENT)
Dept: HEALTH INFORMATION MANAGEMENT | Facility: CLINIC | Age: 68
End: 2023-11-22
Payer: COMMERCIAL

## 2023-11-27 ENCOUNTER — TELEPHONE (OUTPATIENT)
Dept: GASTROENTEROLOGY | Facility: CLINIC | Age: 68
End: 2023-11-27
Payer: COMMERCIAL

## 2023-11-27 NOTE — TELEPHONE ENCOUNTER
Endoscopy Scheduling Screen  Patient droped the call. Needs to finish screening.   Have you had a positive Covid test in the last 14 days?  No    Are you active on [employer]?   No    What insurance is in the chart?  Other:  [facility]

## 2023-11-29 ENCOUNTER — HOSPITAL ENCOUNTER (OUTPATIENT)
Facility: CLINIC | Age: 68
End: 2023-11-29
Attending: INTERNAL MEDICINE | Admitting: INTERNAL MEDICINE
Payer: COMMERCIAL

## 2023-11-29 NOTE — TELEPHONE ENCOUNTER
"Endoscopy Scheduling Screen    Have you had a positive Covid test in the last 14 days?  No    Are you active on MyChart?   No    What insurance is in the chart?  Other:  [facility]    Ordering/Referring Provider:   GUILHERME SMITH        (If ordering provider performs procedure, schedule with ordering provider unless otherwise instructed. )    BMI: Estimated body mass index is 41.97 kg/m  as calculated from the following:    Height as of 11/6/23: 1.676 m (5' 6\").    Weight as of 11/6/23: 117.9 kg (260 lb).     Sedation Ordered  MAC/deep sedation.   BMI<= 45 45 < BMI <= 48 48 < BMI < = 50  BMI > 50   No Restrictions No [facility]  No [facility]  [facility] with exceptions Hospital Only OR Only       Are you taking any prescription medications for pain 3 or more times per week?   Yes, sedation and prep (RN Review required.)    Do you have a history of malignant hyperthermia or adverse reaction to anesthesia?  No     Have you been diagnosed or told you have pulmonary hypertension?   No    Do you have an LVAD?  No    Have you been told you have moderate to severe sleep apnea?  Yes (RN Review required for scheduling unless scheduling in Hospital.)    Have you been told you have COPD, asthma, or any other lung disease?  No    Do you have any heart conditions?  Yes     In the past 6 months, have you had any hospitalizations for heart related issues including cardiomyopathy, heart attack, or stent placement?  No    Do you have any implantable devices in your body (pacemaker, ICD)?  No    Do you take nitroglycerine?  No    Have you ever had an organ transplant?   No    Have you ever had or are you awaiting a heart or lung transplant?   No    Have you had a stroke or transient ischemic attack (TIA aka \"mini stroke\" in the last 6 months?   No    Have you been diagnosed with or been told you have cirrhosis of the liver?   Yes (RN Review required for scheduling unless scheduling in Hospital.)    Are you currently on " "dialysis?   No    Do you need assistance transferring?   No    BMI: Estimated body mass index is 41.97 kg/m  as calculated from the following:    Height as of 11/6/23: 1.676 m (5' 6\").    Weight as of 11/6/23: 117.9 kg (260 lb).     Is patients BMI > 40 and scheduling location [facility]?  No    Do you take an injectable medication for weight loss or diabetes (excluding insulin)?  No    Do you take the medication Naltrexone?  No    Do you take blood thinners?  No       Prep   Are you currently on dialysis or do you have chronic kidney disease?  No    Do you have a diagnosis of diabetes?  Yes (Golytely Prep)    Do you have a diagnosis of cystic fibrosis (CF)?  No    On a regular basis do you go 3 -5 days between bowel movements?  No    BMI > 40?  Yes (Extended Prep)    Preferred Pharmacy:    [place], [place] - [address], [address]  [address]  [facility] [address]  Phone: [phone] Fax: [phone]    Final Scheduling Details   Colonoscopy prep sent?  Golytely Extended Prep    Procedure scheduled  Colonoscopy    Surgeon:  JULIA     Date of procedure:  02/29/2024     Pre-OP / PAC:   Yes - Patient informed of pre-op requirement.    Location  [facility] - Patient preference.    Sedation   MAC/Deep Sedation - Per order.      Patient Reminders:   You will receive a call from a Nurse to review instructions and health history.  This assessment must be completed prior to your procedure.  Failure to complete the Nurse assessment may result in the procedure being cancelled.      On the day of your procedure, please designate an adult(s) who can drive you home stay with you for the next 24 hours. The medicines used in the exam will make you sleepy. You will not be able to drive.      You cannot take public transportation, ride share services, or non-medical taxi service without a responsible caregiver.  Medical transport services are allowed with the requirement that a " responsible caregiver will receive you at your destination.  We require that drivers and caregivers are confirmed prior to your procedure.

## 2023-11-29 NOTE — TELEPHONE ENCOUNTER
RN review requested for prep type. BMI is  41.96 and Pt has morphine pain pump in place.     Extended Golytely recommended.     Chantel Adan, RN   Endoscopy Procedure Pre Assessment RN

## 2023-12-06 ENCOUNTER — TELEPHONE (OUTPATIENT)
Dept: FAMILY MEDICINE | Facility: CLINIC | Age: 68
End: 2023-12-06
Payer: COMMERCIAL

## 2023-12-06 DIAGNOSIS — E11.69 TYPE 2 DIABETES MELLITUS WITH OTHER SPECIFIED COMPLICATION, WITHOUT LONG-TERM CURRENT USE OF INSULIN (H): Primary | ICD-10-CM

## 2023-12-06 NOTE — TELEPHONE ENCOUNTER
I spoke with Erickson, he is in need of financial assistance for medication.    We reviewed the Prescription Assistance Program for manfacturer assistance programs, insurance and Rx list.     assistance applications will be completed for: Vraylar, Cymbalta, Lantus Solostar. -- Erickson is not eligible for the Trulicity program. To be eligible he needed to be enrolled in 2022 &  2023.Ozempic is accepting applications.    Skyrizi is prescribed with his Dermatologist outside of [place].    When approved, Erickson will receive this medication at no cost through December 2024.    Barb Huerta  Prescription Assistance Supervisor  Pharmacy Assistance

## 2023-12-08 NOTE — TELEPHONE ENCOUNTER
Both Erickson and Dr. Radha Mcdermott agree to switch the Trulicity from 1.5 mg every 7 days to Ozempic 0.5 mg every 7 days. This will allow Erickson to apply for the Ozempic  assistance program.     Plan:   Send prescription for Ozemepic 0.5 mg to [facility].     Roxann Hdz RPH on 12/8/2023 at 2:09 PM      Erickson VILLEDA Cordo YouYesterday (12:44 PM)     Please switch to ozembic. Thanks, Lisa Almendarez T CordoYesterday (12:41 PM)     MS Renate Almendarez,      I wanted to let you know that we heard back from our patient assistance program and tell you that  assistance applications have been completed for Vraylar, Cymbalta, and Lantus. Unfortunately, I am being  told that the Trulicity (your once weekly injection for diabetes and blood sugar control) assistance program you are not eligible for. However, there is another medication in the same family of medications, that works the same way, called Ozempic, that is accepting applications.      We could switch you from the Trulicity to the Ozempic so that way you could apply for their assistance program.      Please let me know if this is something you would like to consider. If you are willing to switch, I will confirm that it is okay with Dr. Radha Mcdermott and we can start the  assistance application.     Thank you,      Roxann Hdz RPH on 12/7/2023 at 12:39 PM          Sherwin Mejia, Roxann Priest RPH  This sounds great! Thank you for helping to coordinate it!  -Dr. 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 However, Ozempic  is taking new applications.    Based on his dose of Trulicity at 1.5 mg weekly the conversion to Ozempic would be 0.5 mg weekly.    I contacted the patient to see if he was interested in making this switch, and he confirms that he is. I want to confirm with you that you feel this would be appropriate for the patient. I will help coordinate with the patient assistance program his application for Ozempic and send a new prescription with your approval.      Thank you for your time.    Roxann Salas [facility] on 12/7/2023 at 6:10 PM

## 2023-12-12 ENCOUNTER — OFFICE VISIT (OUTPATIENT)
Dept: FAMILY MEDICINE | Facility: CLINIC | Age: 68
End: 2023-12-12
Payer: COMMERCIAL

## 2023-12-12 VITALS
OXYGEN SATURATION: 96 % | HEIGHT: 66 IN | TEMPERATURE: 97.5 F | BODY MASS INDEX: 42.46 KG/M2 | DIASTOLIC BLOOD PRESSURE: 96 MMHG | RESPIRATION RATE: 18 BRPM | SYSTOLIC BLOOD PRESSURE: 180 MMHG | WEIGHT: 264.2 LBS | HEART RATE: 82 BPM

## 2023-12-12 DIAGNOSIS — F31.76 DEPRESSED BIPOLAR I DISORDER IN FULL REMISSION (H): Primary | ICD-10-CM

## 2023-12-12 DIAGNOSIS — I87.2 VENOUS STASIS DERMATITIS OF BOTH LOWER EXTREMITIES: ICD-10-CM

## 2023-12-12 DIAGNOSIS — I10 HYPERTENSION GOAL BP (BLOOD PRESSURE) < 140/90: ICD-10-CM

## 2023-12-12 DIAGNOSIS — Z29.11 NEED FOR VACCINATION AGAINST RESPIRATORY SYNCYTIAL VIRUS: ICD-10-CM

## 2023-12-12 DIAGNOSIS — E11.69 TYPE 2 DIABETES MELLITUS WITH OTHER SPECIFIED COMPLICATION, WITHOUT LONG-TERM CURRENT USE OF INSULIN (H): ICD-10-CM

## 2023-12-12 DIAGNOSIS — L40.9 PSORIASIS: ICD-10-CM

## 2023-12-12 DIAGNOSIS — S91.109A WOUND, OPEN, TOE WITH COMPLICATION, INITIAL ENCOUNTER: ICD-10-CM

## 2023-12-12 PROCEDURE — 99215 OFFICE O/P EST HI 40 MIN: CPT | Performed by: FAMILY MEDICINE

## 2023-12-12 RX ORDER — RESPIRATORY SYNCYTIAL VIRUS VACCINE 120MCG/0.5
0.5 KIT INTRAMUSCULAR ONCE
Qty: 1 EACH | Refills: 0 | Status: SHIPPED | OUTPATIENT
Start: 2023-12-12 | End: 2023-12-12

## 2023-12-12 ASSESSMENT — ANXIETY QUESTIONNAIRES
4. TROUBLE RELAXING: SEVERAL DAYS
2. NOT BEING ABLE TO STOP OR CONTROL WORRYING: SEVERAL DAYS
6. BECOMING EASILY ANNOYED OR IRRITABLE: SEVERAL DAYS
GAD7 TOTAL SCORE: 6
IF YOU CHECKED OFF ANY PROBLEMS ON THIS QUESTIONNAIRE, HOW DIFFICULT HAVE THESE PROBLEMS MADE IT FOR YOU TO DO YOUR WORK, TAKE CARE OF THINGS AT HOME, OR GET ALONG WITH OTHER PEOPLE: SOMEWHAT DIFFICULT
5. BEING SO RESTLESS THAT IT IS HARD TO SIT STILL: SEVERAL DAYS
GAD7 TOTAL SCORE: 6
3. WORRYING TOO MUCH ABOUT DIFFERENT THINGS: SEVERAL DAYS
1. FEELING NERVOUS, ANXIOUS, OR ON EDGE: SEVERAL DAYS
7. FEELING AFRAID AS IF SOMETHING AWFUL MIGHT HAPPEN: NOT AT ALL

## 2023-12-12 ASSESSMENT — PATIENT HEALTH QUESTIONNAIRE - PHQ9
SUM OF ALL RESPONSES TO PHQ QUESTIONS 1-9: 6
SUM OF ALL RESPONSES TO PHQ QUESTIONS 1-9: 6
10. IF YOU CHECKED OFF ANY PROBLEMS, HOW DIFFICULT HAVE THESE PROBLEMS MADE IT FOR YOU TO DO YOUR WORK, TAKE CARE OF THINGS AT HOME, OR GET ALONG WITH OTHER PEOPLE: VERY DIFFICULT

## 2023-12-12 ASSESSMENT — PAIN SCALES - GENERAL: PAINLEVEL: MODERATE PAIN (5)

## 2023-12-12 NOTE — PROGRESS NOTES
Assessment & Plan     1.  Depressed bipolar 1 disorder    He reports being off Vraylar due to cost and has noticed worsening depression symptoms because of it.  He is still taking Wellbutrin and Cymbalta.  He is working with the patient assistance program to get coverage for this.  It sounds like his applications are in, but he needs to contact company about getting his next prescription.  He is planning to do this when he gets home today.  He is also going to follow-up with our [place] pharmacist.      2. Hypertension goal BP (blood pressure) < 140/90    Blood pressure significantly elevated at today's visit.  He states he was in a rush this morning and did not take his HTN medication.  Normally compliant with meds and takes BP readings several times/week at home.  Systolic readings at home typically in the 120-150 range.      Patient will return home and make sure he takes his HTN medication for the day.    4. Venous stasis dermatitis of both lower extremities      - Wound Care Referral; Future    5. Wound, open, toe with complication, initial encounter    Medial aspect of right big toe has significant callusing and edema present.  Minimal response to light touch on ventral aspect of big toe. Patient states he is unable to get care for his toe from the podiatrist any longer. A referral to wound care was made at today's visit for follow up on how to care for the toe moving forward.       - Wound Care Referral; Future    6.  Type 2 diabetes    He reports having enough of his diabetes medications at this point.  He is going to be switching from Trulicity to Ozempic due to insurance cost reasons.  He continues to work with our [place] pharmacist.    7. Psoriasis    Patient has not been taking his Skyrizi prescription for several weeks due to cost.  Arms and legs have widespread large plaques present bilaterally.  No splitting or bleeding noted from plaques.  Patient is currently being managed by [facility].  A  referral to [facility] was placed today to see if his care can be transferred to their providers.    - Adult Dermatology  Referral; Future    8.  Whenever you need to go get much do it need for vaccination against respiratory syncytial virus    Discussed and will pursue getting the vaccination at his pharmacy.    - respiratory syncytial virus vaccine, bivalent (ABRYSVO) injection; Inject 0.5 mLs into the muscle once for 1 dose  Dispense: 1 each; Refill: 0    Review of external notes as documented elsewhere in note  42 minutes spent by me on the date of the encounter doing chart review, review of test results, interpretation of tests, patient visit, and documentation            Sherwin Mcdermott, [facility] Almendarez is a 68 year old, presenting for the following health issues:  Follow Up (Pneumonia, Psoriasis, Depression - unable to afford Vraylar), Diabetes (Unable to afford Trulicity), and Pain      History of Present Illness       Back Pain:  He presents for follow up of back pain. Patient's back pain is a chronic problem.  Location of back pain:  Right lower back and left lower back  Description of back pain: sharp  Back pain spreads: right buttocks    Since patient first noticed back pain, pain is: unchanged  Does back pain interfere with his job:  Yes       COPD:  He presents for follow up of COPD.  Overall, COPD symptoms are stable since last visit.  He has same as usual fatigue or shortness of breath with exertion and more than usual shortness of breath at rest.  He rarely coughs and does not have change in sputum. No recent fever. He can walk less than 1 block without stopping to rest. He can walk 1 flights of stairs without resting. The patient has had no ED, urgent care, or hospital admissions because of COPD since the last visit.     Mental Health Follow-up:  Patient presents to follow-up on Depression & Anxiety.Patient's depression since  "last visit has been:  Bad  The patient is not having other symptoms associated with depression.  Patient's anxiety since last visit has been:  Medium  The patient is not having other symptoms associated with anxiety.  Any significant life events: No  Patient is not feeling anxious or having panic attacks.  Patient has no concerns about alcohol or drug use.    Diabetes:   He presents for follow up of diabetes.  He is checking home blood glucose a few times a week.   He checks blood glucose before meals.  Blood glucose is sometimes over 200 and never under 70. He is aware of hypoglycemia symptoms including weakness.   He is concerned about blood sugar frequently over 200 and other.   He is having numbness in feet, excessive thirst and weight gain.            Hypertension: He presents for follow up of hypertension.  He does check blood pressure  regularly outside of the clinic. Outside blood pressures have been over 140/90. He follows a low salt diet.     He eats 0-1 servings of fruits and vegetables daily.He consumes 0 sweetened beverage(s) daily.He exercises with enough effort to increase his heart rate 9 or less minutes per day.  He is missing 2 dose(s) of medications per week.                 Review of Systems         Objective    BP (!) 180/96   Pulse 82   Temp 97.5  F (36.4  C) (Temporal)   Resp 18   Ht 1.676 m (5' 6\")   Wt 119.8 kg (264 lb 3.2 oz)   SpO2 96%   BMI 42.64 kg/m    Body mass index is 42.64 kg/m .  Physical Exam   GENERAL: healthy and no distress  EYES: Eyes grossly normal to inspection, PERRL and conjunctivae and sclerae normal  HENT: ear canals and TM's normal, nose and mouth without ulcers or lesions  NECK: no adenopathy, no asymmetry, masses, or scars and thyroid normal to palpation  RESP: lungs clear to auscultation - no rales, rhonchi or wheezes  CV: regular rate and rhythm, normal S1 S2, no S3 or S4, no murmur, click or rub, no peripheral edema and peripheral pulses strong  ABDOMEN: " soft, nontender, no hepatosplenomegaly, no masses and bowel sounds normal  MS: no gross musculoskeletal defects noted, no edema  SKIN: no suspicious lesions or rashes although widespread psoriatic plaques on arms and legs bilaterally  NEURO: Normal strength and tone, mentation intact and speech normal  PSYCH: mentation appears normal, affect normal/bright  Diabetic foot exam: normal DP and PT pulses, no trophic changes or ulcerative lesions, normal sensory exam, and normal monofilament exam, except for decreased sensation in the right ventral aspect of the large toe, as well as significant callusing and edema on the medial aspect of the right, big toe.                          Answers submitted by the patient for this visit:  Patient Health Questionnaire (Submitted on 12/12/2023)  If you checked off any problems, how difficult have these problems made it for you to do your work, take care of things at home, or get along with other people?: Very difficult  PHQ9 TOTAL SCORE: 6  NANCY-7 (Submitted on 12/12/2023)  NANCY 7 TOTAL SCORE: 6

## 2023-12-14 ENCOUNTER — TELEPHONE (OUTPATIENT)
Dept: WOUND CARE | Facility: CLINIC | Age: 68
End: 2023-12-14
Payer: COMMERCIAL

## 2023-12-14 NOTE — TELEPHONE ENCOUNTER
Referral received from patients family medicine doctor. Writer spoke with patient who states he has no open wounds. States he has psoriasis and callus concerns. He plans to call back his family medicine doctor as he will most likely need podiatry and dermatology referral.   Naomie Walker RN

## 2023-12-18 ENCOUNTER — NURSE TRIAGE (OUTPATIENT)
Dept: NURSING | Facility: CLINIC | Age: 68
End: 2023-12-18
Payer: COMMERCIAL

## 2023-12-19 NOTE — TELEPHONE ENCOUNTER
"Patient reporting hx of lymphedema and \"plaque psoriasis\" of lower legs, a bit on face, and both arms for years. It has  been worsening last couple months and  Dermatology treating last couple months.      He has been peeling it recently, in small \"thumbnail\" amounts, which hurts and at times has bled. There are tiny red spots but denies open wounds, active bleeding.  Increasing tender to touch, but denies severe pain, denies fever.    Disposition to see provider within 24 hours.  Patient verbalizes understanding, says he has stopped picking, and will call provider in the morning.    Tia Luciano RN  [facility]                Reason for Disposition   [1] Looks infected (spreading redness, pus) AND [2] no fever    Additional Information   Negative: [1] Life-threatening reaction (anaphylaxis) in the past to similar substance (e.g., food, insect bite/sting, chemical, etc.) AND [2] < 2 hours since exposure   Negative: [1] Sudden onset of rash (within last 2 hours) AND [2] difficulty breathing or swallowing   Negative: Shock suspected (e.g., cold/pale/clammy skin, too weak to stand, low BP, rapid pulse)   Negative: Difficult to awaken or acting confused (e.g., disoriented, slurred speech)   Negative: [1] Purple or blood-colored spots or dots AND [2] fever   Negative: Sounds like a life-threatening emergency to the triager   Negative: [1] Bright red, sunburn-like rash AND [2] current tampon use or nasal packing   Negative: [1] Bright red, sunburn-like rash AND [3] wound infection or recent surgery   Negative: [1] Bright red skin AND [2] peels off in sheets   Negative: Stiff neck (can't touch chin to chest)   Negative: Fever   Negative: Joint pain or swelling     Not new, has had for years   Negative: Patient sounds very sick or weak to the triager   Negative: [1] Purple or blood-colored rash (spots or dots) AND [2] no fever AND [3] sounds well to triager   Negative: Large or small blisters on skin (i.e., " fluid filled bubbles or sacs)   Negative: Bloody crusts on lips or sores in mouth   Negative: Face becomes swollen   Negative: [1] Headache AND [2] no fever   Negative: SEVERE itching (i.e., interferes with sleep, normal activities or school)   Negative: Sore throat   Negative: Pregnant   Negative: [1] Major bleeding (e.g., actively dripping or spurting) AND [2] can't be stopped   Negative: Amputation   Negative: Shock suspected (e.g., cold/pale/clammy skin, too weak to stand, low BP, rapid pulse)   Negative: [1] Knife wound (or other possibly deep cut) AND [2] to chest, abdomen, back, neck, or head   Negative: Sounds like a life-threatening emergency to the triager   Negative: [1] Bleeding AND [2] won't stop after 10 minutes of direct pressure (using correct technique)   Negative: Skin is split open or gaping (or length > 1/2 inch or 12 mm on the skin, 1/4 inch or 6 mm on the face)   Negative: [1] Deep cut AND [2] can see bone or tendons   Negative: Skin loss goes very deep (e.g., can see bones or tendons)   Negative: Skin loss involves more than 10% of body surface area (BSA)   Negative: [1] Dirt in the wound AND [2] not removed with 15 minutes of scrubbing   Negative: High pressure injection injury (e.g., from grease gun or paint gun, usually work-related)   Negative: Wound causes numbness (i.e., loss of sensation)   Negative: Wound causes weakness (i.e., decreased ability to move hand, finger, toe)   Negative: Sounds like a serious injury to the triager   Negative: [1] SEVERE pain AND [2] not improved 2 hours after pain medicine/ice packs   Negative: [1] Looks infected AND [2] large red area (> 2 inches or 5 cm) or streak   Negative: [1] Looks infected (spreading redness, red streak, pus) AND [2] fever   Negative: Suspicious history for the injury   Negative: [1] Raised bruise AND [2] size > 2 inches (5 cm) AND [3] expanding    Protocols used: Rash or Redness - Widespread-A-AH, Skin Injury-A-AH

## 2023-12-21 ENCOUNTER — TRANSFERRED RECORDS (OUTPATIENT)
Dept: HEALTH INFORMATION MANAGEMENT | Facility: CLINIC | Age: 68
End: 2023-12-21
Payer: COMMERCIAL

## 2023-12-24 ENCOUNTER — HOSPITAL ENCOUNTER (INPATIENT)
Facility: CLINIC | Age: 68
LOS: 2 days | Discharge: HOME | DRG: 193 | End: 2023-12-26
Attending: EMERGENCY MEDICINE | Admitting: INTERNAL MEDICINE
Payer: COMMERCIAL

## 2023-12-24 ENCOUNTER — APPOINTMENT (OUTPATIENT)
Dept: GENERAL RADIOLOGY | Facility: CLINIC | Age: 68
DRG: 193 | End: 2023-12-24
Attending: EMERGENCY MEDICINE
Payer: COMMERCIAL

## 2023-12-24 ENCOUNTER — APPOINTMENT (OUTPATIENT)
Dept: CT IMAGING | Facility: CLINIC | Age: 68
DRG: 193 | End: 2023-12-24
Attending: EMERGENCY MEDICINE
Payer: COMMERCIAL

## 2023-12-24 DIAGNOSIS — G62.9 PERIPHERAL POLYNEUROPATHY: ICD-10-CM

## 2023-12-24 DIAGNOSIS — J18.9 PNEUMONIA OF LEFT UPPER LOBE DUE TO INFECTIOUS ORGANISM: ICD-10-CM

## 2023-12-24 LAB
ALBUMIN SERPL BCG-MCNC: 3.9 G/DL (ref 3.5–5.2)
ALBUMIN UR-MCNC: 10 MG/DL
ALP SERPL-CCNC: 105 U/L (ref 40–150)
ALT SERPL W P-5'-P-CCNC: 38 U/L (ref 0–70)
ANION GAP SERPL CALCULATED.3IONS-SCNC: 8 MMOL/L (ref 7–15)
APPEARANCE UR: ABNORMAL
AST SERPL W P-5'-P-CCNC: 30 U/L (ref 0–45)
BACTERIA #/AREA URNS HPF: ABNORMAL /HPF
BASE EXCESS BLDV CALC-SCNC: 6.1 MMOL/L (ref -7.7–1.9)
BASOPHILS # BLD AUTO: 0.1 10E3/UL (ref 0–0.2)
BASOPHILS NFR BLD AUTO: 0 %
BILIRUB SERPL-MCNC: 0.6 MG/DL
BILIRUB UR QL STRIP: NEGATIVE
BUN SERPL-MCNC: 34.8 MG/DL (ref 8–23)
CALCIUM SERPL-MCNC: 8.9 MG/DL (ref 8.8–10.2)
CHLORIDE SERPL-SCNC: 93 MMOL/L (ref 98–107)
COLOR UR AUTO: YELLOW
CREAT SERPL-MCNC: 1.67 MG/DL (ref 0.67–1.17)
DEPRECATED HCO3 PLAS-SCNC: 33 MMOL/L (ref 22–29)
EGFRCR SERPLBLD CKD-EPI 2021: 45 ML/MIN/1.73M2
EOSINOPHIL # BLD AUTO: 0.1 10E3/UL (ref 0–0.7)
EOSINOPHIL NFR BLD AUTO: 1 %
ERYTHROCYTE [DISTWIDTH] IN BLOOD BY AUTOMATED COUNT: 13.5 % (ref 10–15)
ETHANOL SERPL-MCNC: <0.01 G/DL
FLUAV RNA SPEC QL NAA+PROBE: NEGATIVE
FLUBV RNA RESP QL NAA+PROBE: NEGATIVE
GLUCOSE SERPL-MCNC: 366 MG/DL (ref 70–99)
GLUCOSE UR STRIP-MCNC: >=1000 MG/DL
HCO3 BLDV-SCNC: 34 MMOL/L (ref 21–28)
HCT VFR BLD AUTO: 37.8 % (ref 40–53)
HGB BLD-MCNC: 13.1 G/DL (ref 13.3–17.7)
HGB UR QL STRIP: NEGATIVE
HYALINE CASTS: 16 /LPF
IMM GRANULOCYTES # BLD: 0.1 10E3/UL
IMM GRANULOCYTES NFR BLD: 1 %
KETONES UR STRIP-MCNC: NEGATIVE MG/DL
LEUKOCYTE ESTERASE UR QL STRIP: NEGATIVE
LYMPHOCYTES # BLD AUTO: 0.8 10E3/UL (ref 0.8–5.3)
LYMPHOCYTES NFR BLD AUTO: 5 %
MCH RBC QN AUTO: 33.2 PG (ref 26.5–33)
MCHC RBC AUTO-ENTMCNC: 34.7 G/DL (ref 31.5–36.5)
MCV RBC AUTO: 96 FL (ref 78–100)
MONOCYTES # BLD AUTO: 0.7 10E3/UL (ref 0–1.3)
MONOCYTES NFR BLD AUTO: 4 %
MUCOUS THREADS #/AREA URNS LPF: PRESENT /LPF
NEUTROPHILS # BLD AUTO: 13.7 10E3/UL (ref 1.6–8.3)
NEUTROPHILS NFR BLD AUTO: 89 %
NITRATE UR QL: NEGATIVE
NRBC # BLD AUTO: 0 10E3/UL
NRBC BLD AUTO-RTO: 0 /100
NT-PROBNP SERPL-MCNC: 155 PG/ML (ref 0–900)
O2/TOTAL GAS SETTING VFR VENT: 2 %
PCO2 BLDV: 64 MM HG (ref 40–50)
PH BLDV: 7.33 [PH] (ref 7.32–7.43)
PH UR STRIP: 5 [PH] (ref 5–7)
PLATELET # BLD AUTO: 153 10E3/UL (ref 150–450)
PO2 BLDV: 32 MM HG (ref 25–47)
POTASSIUM SERPL-SCNC: 4.4 MMOL/L (ref 3.4–5.3)
PROT SERPL-MCNC: 6.3 G/DL (ref 6.4–8.3)
RBC # BLD AUTO: 3.94 10E6/UL (ref 4.4–5.9)
RBC URINE: 1 /HPF
RSV RNA SPEC NAA+PROBE: NEGATIVE
SARS-COV-2 RNA RESP QL NAA+PROBE: NEGATIVE
SODIUM SERPL-SCNC: 134 MMOL/L (ref 135–145)
SP GR UR STRIP: 1.02 (ref 1–1.03)
SQUAMOUS EPITHELIAL: <1 /HPF
TROPONIN T SERPL HS-MCNC: 37 NG/L
UROBILINOGEN UR STRIP-MCNC: NORMAL MG/DL
WBC # BLD AUTO: 15.4 10E3/UL (ref 4–11)
WBC URINE: 3 /HPF

## 2023-12-24 PROCEDURE — 250N000012 HC RX MED GY IP 250 OP 636 PS 637: Performed by: INTERNAL MEDICINE

## 2023-12-24 PROCEDURE — 36600 WITHDRAWAL OF ARTERIAL BLOOD: CPT

## 2023-12-24 PROCEDURE — 250N000013 HC RX MED GY IP 250 OP 250 PS 637: Performed by: INTERNAL MEDICINE

## 2023-12-24 PROCEDURE — 82077 ASSAY SPEC XCP UR&BREATH IA: CPT | Performed by: EMERGENCY MEDICINE

## 2023-12-24 PROCEDURE — 87040 BLOOD CULTURE FOR BACTERIA: CPT | Performed by: EMERGENCY MEDICINE

## 2023-12-24 PROCEDURE — 250N000011 HC RX IP 250 OP 636: Performed by: EMERGENCY MEDICINE

## 2023-12-24 PROCEDURE — 272N000064 HC CIRCUIT HUMIDITY W/CPAP BIPAP

## 2023-12-24 PROCEDURE — 81003 URINALYSIS AUTO W/O SCOPE: CPT | Performed by: INTERNAL MEDICINE

## 2023-12-24 PROCEDURE — 258N000003 HC RX IP 258 OP 636: Performed by: INTERNAL MEDICINE

## 2023-12-24 PROCEDURE — 94660 CPAP INITIATION&MGMT: CPT

## 2023-12-24 PROCEDURE — 80053 COMPREHEN METABOLIC PANEL: CPT | Performed by: EMERGENCY MEDICINE

## 2023-12-24 PROCEDURE — 84484 ASSAY OF TROPONIN QUANT: CPT | Performed by: EMERGENCY MEDICINE

## 2023-12-24 PROCEDURE — 36415 COLL VENOUS BLD VENIPUNCTURE: CPT | Performed by: EMERGENCY MEDICINE

## 2023-12-24 PROCEDURE — 120N000001 HC R&B MED SURG/OB

## 2023-12-24 PROCEDURE — 272N000063 HC CIRCUIT HUMID FACE/TRACH MSK

## 2023-12-24 PROCEDURE — 71046 X-RAY EXAM CHEST 2 VIEWS: CPT

## 2023-12-24 PROCEDURE — 82803 BLOOD GASES ANY COMBINATION: CPT | Performed by: EMERGENCY MEDICINE

## 2023-12-24 PROCEDURE — 87637 SARSCOV2&INF A&B&RSV AMP PRB: CPT | Performed by: EMERGENCY MEDICINE

## 2023-12-24 PROCEDURE — 83880 ASSAY OF NATRIURETIC PEPTIDE: CPT | Performed by: EMERGENCY MEDICINE

## 2023-12-24 PROCEDURE — 99291 CRITICAL CARE FIRST HOUR: CPT | Mod: 25

## 2023-12-24 PROCEDURE — 70450 CT HEAD/BRAIN W/O DYE: CPT

## 2023-12-24 PROCEDURE — 999N000157 HC STATISTIC RCP TIME EA 10 MIN

## 2023-12-24 PROCEDURE — 85025 COMPLETE CBC W/AUTO DIFF WBC: CPT | Performed by: EMERGENCY MEDICINE

## 2023-12-24 PROCEDURE — 99222 1ST HOSP IP/OBS MODERATE 55: CPT | Performed by: INTERNAL MEDICINE

## 2023-12-24 PROCEDURE — 82803 BLOOD GASES ANY COMBINATION: CPT | Performed by: INTERNAL MEDICINE

## 2023-12-24 RX ORDER — ONDANSETRON 4 MG/1
4 TABLET, ORALLY DISINTEGRATING ORAL EVERY 6 HOURS PRN
Status: DISCONTINUED | OUTPATIENT
Start: 2023-12-24 | End: 2023-12-26 | Stop reason: HOSPADM

## 2023-12-24 RX ORDER — AMOXICILLIN 250 MG
1 CAPSULE ORAL 2 TIMES DAILY PRN
Status: DISCONTINUED | OUTPATIENT
Start: 2023-12-24 | End: 2023-12-26 | Stop reason: HOSPADM

## 2023-12-24 RX ORDER — NALOXONE HYDROCHLORIDE 0.4 MG/ML
0.2 INJECTION, SOLUTION INTRAMUSCULAR; INTRAVENOUS; SUBCUTANEOUS
Status: DISCONTINUED | OUTPATIENT
Start: 2023-12-24 | End: 2023-12-26 | Stop reason: HOSPADM

## 2023-12-24 RX ORDER — ASCORBIC ACID 500 MG
1000 TABLET ORAL DAILY
Status: DISCONTINUED | OUTPATIENT
Start: 2023-12-25 | End: 2023-12-26 | Stop reason: HOSPADM

## 2023-12-24 RX ORDER — CEFTRIAXONE 2 G/1
2 INJECTION, POWDER, FOR SOLUTION INTRAMUSCULAR; INTRAVENOUS EVERY 24 HOURS
Status: DISCONTINUED | OUTPATIENT
Start: 2023-12-25 | End: 2023-12-26 | Stop reason: HOSPADM

## 2023-12-24 RX ORDER — ONDANSETRON 2 MG/ML
4 INJECTION INTRAMUSCULAR; INTRAVENOUS EVERY 6 HOURS PRN
Status: DISCONTINUED | OUTPATIENT
Start: 2023-12-24 | End: 2023-12-26 | Stop reason: HOSPADM

## 2023-12-24 RX ORDER — CEFTRIAXONE 2 G/1
2 INJECTION, POWDER, FOR SOLUTION INTRAMUSCULAR; INTRAVENOUS ONCE
Status: COMPLETED | OUTPATIENT
Start: 2023-12-24 | End: 2023-12-24

## 2023-12-24 RX ORDER — METOPROLOL SUCCINATE 100 MG/1
100 TABLET, EXTENDED RELEASE ORAL DAILY
Status: DISCONTINUED | OUTPATIENT
Start: 2023-12-25 | End: 2023-12-26 | Stop reason: HOSPADM

## 2023-12-24 RX ORDER — AZITHROMYCIN 500 MG/1
500 INJECTION, POWDER, LYOPHILIZED, FOR SOLUTION INTRAVENOUS EVERY 24 HOURS
Status: DISCONTINUED | OUTPATIENT
Start: 2023-12-25 | End: 2023-12-26 | Stop reason: HOSPADM

## 2023-12-24 RX ORDER — MULTIPLE VITAMINS W/ MINERALS TAB 9MG-400MCG
1 TAB ORAL DAILY
Status: DISCONTINUED | OUTPATIENT
Start: 2023-12-25 | End: 2023-12-26 | Stop reason: HOSPADM

## 2023-12-24 RX ORDER — ACETAMINOPHEN 500 MG
1000 TABLET ORAL EVERY 8 HOURS PRN
Status: DISCONTINUED | OUTPATIENT
Start: 2023-12-24 | End: 2023-12-26 | Stop reason: HOSPADM

## 2023-12-24 RX ORDER — TAMSULOSIN HYDROCHLORIDE 0.4 MG/1
0.4 CAPSULE ORAL 2 TIMES DAILY
Status: DISCONTINUED | OUTPATIENT
Start: 2023-12-24 | End: 2023-12-26 | Stop reason: HOSPADM

## 2023-12-24 RX ORDER — SODIUM CHLORIDE 9 MG/ML
INJECTION, SOLUTION INTRAVENOUS CONTINUOUS
Status: DISCONTINUED | OUTPATIENT
Start: 2023-12-24 | End: 2023-12-25

## 2023-12-24 RX ORDER — PREGABALIN 100 MG/1
100 CAPSULE ORAL AT BEDTIME
Status: DISCONTINUED | OUTPATIENT
Start: 2023-12-25 | End: 2023-12-26 | Stop reason: HOSPADM

## 2023-12-24 RX ORDER — NALOXONE HYDROCHLORIDE 0.4 MG/ML
0.4 INJECTION, SOLUTION INTRAMUSCULAR; INTRAVENOUS; SUBCUTANEOUS
Status: DISCONTINUED | OUTPATIENT
Start: 2023-12-24 | End: 2023-12-26 | Stop reason: HOSPADM

## 2023-12-24 RX ORDER — LIDOCAINE 40 MG/G
CREAM TOPICAL
Status: DISCONTINUED | OUTPATIENT
Start: 2023-12-24 | End: 2023-12-26 | Stop reason: HOSPADM

## 2023-12-24 RX ORDER — DEXTROSE MONOHYDRATE 25 G/50ML
25-50 INJECTION, SOLUTION INTRAVENOUS
Status: DISCONTINUED | OUTPATIENT
Start: 2023-12-24 | End: 2023-12-26 | Stop reason: HOSPADM

## 2023-12-24 RX ORDER — NICOTINE POLACRILEX 4 MG
15-30 LOZENGE BUCCAL
Status: DISCONTINUED | OUTPATIENT
Start: 2023-12-24 | End: 2023-12-26 | Stop reason: HOSPADM

## 2023-12-24 RX ORDER — ALBUTEROL SULFATE 90 UG/1
2 AEROSOL, METERED RESPIRATORY (INHALATION) EVERY 6 HOURS PRN
Status: DISCONTINUED | OUTPATIENT
Start: 2023-12-24 | End: 2023-12-26 | Stop reason: HOSPADM

## 2023-12-24 RX ORDER — OXYCODONE HYDROCHLORIDE 5 MG/1
5 TABLET ORAL EVERY 4 HOURS PRN
Status: DISCONTINUED | OUTPATIENT
Start: 2023-12-24 | End: 2023-12-26 | Stop reason: HOSPADM

## 2023-12-24 RX ORDER — AMOXICILLIN 250 MG
2 CAPSULE ORAL 2 TIMES DAILY PRN
Status: DISCONTINUED | OUTPATIENT
Start: 2023-12-24 | End: 2023-12-26 | Stop reason: HOSPADM

## 2023-12-24 RX ORDER — DULOXETIN HYDROCHLORIDE 30 MG/1
30 CAPSULE, DELAYED RELEASE ORAL 2 TIMES DAILY
Status: DISCONTINUED | OUTPATIENT
Start: 2023-12-24 | End: 2023-12-26 | Stop reason: HOSPADM

## 2023-12-24 RX ORDER — NITROGLYCERIN 0.4 MG/1
0.4 TABLET SUBLINGUAL EVERY 5 MIN PRN
Status: DISCONTINUED | OUTPATIENT
Start: 2023-12-24 | End: 2023-12-26 | Stop reason: HOSPADM

## 2023-12-24 RX ORDER — CALCIUM CARBONATE 500 MG/1
1000 TABLET, CHEWABLE ORAL 4 TIMES DAILY PRN
Status: DISCONTINUED | OUTPATIENT
Start: 2023-12-24 | End: 2023-12-26 | Stop reason: HOSPADM

## 2023-12-24 RX ORDER — PROCHLORPERAZINE 25 MG
12.5 SUPPOSITORY, RECTAL RECTAL EVERY 12 HOURS PRN
Status: DISCONTINUED | OUTPATIENT
Start: 2023-12-24 | End: 2023-12-26 | Stop reason: HOSPADM

## 2023-12-24 RX ORDER — LOPERAMIDE HCL 2 MG
2 CAPSULE ORAL 4 TIMES DAILY PRN
Status: DISCONTINUED | OUTPATIENT
Start: 2023-12-24 | End: 2023-12-26 | Stop reason: HOSPADM

## 2023-12-24 RX ORDER — MODAFINIL 200 MG/1
400 TABLET ORAL DAILY
Status: DISCONTINUED | OUTPATIENT
Start: 2023-12-25 | End: 2023-12-26 | Stop reason: HOSPADM

## 2023-12-24 RX ORDER — AZITHROMYCIN 500 MG/1
500 INJECTION, POWDER, LYOPHILIZED, FOR SOLUTION INTRAVENOUS ONCE
Status: COMPLETED | OUTPATIENT
Start: 2023-12-24 | End: 2023-12-24

## 2023-12-24 RX ORDER — LOSARTAN POTASSIUM 100 MG/1
100 TABLET ORAL DAILY
Status: CANCELLED | OUTPATIENT
Start: 2023-12-24

## 2023-12-24 RX ORDER — ASPIRIN 81 MG/1
81 TABLET ORAL DAILY
Status: DISCONTINUED | OUTPATIENT
Start: 2023-12-25 | End: 2023-12-26 | Stop reason: HOSPADM

## 2023-12-24 RX ORDER — BUPROPION HYDROCHLORIDE 150 MG/1
150 TABLET ORAL EVERY MORNING
Status: DISCONTINUED | OUTPATIENT
Start: 2023-12-25 | End: 2023-12-26 | Stop reason: HOSPADM

## 2023-12-24 RX ORDER — PROCHLORPERAZINE MALEATE 5 MG
5 TABLET ORAL EVERY 6 HOURS PRN
Status: DISCONTINUED | OUTPATIENT
Start: 2023-12-24 | End: 2023-12-26 | Stop reason: HOSPADM

## 2023-12-24 RX ADMIN — INSULIN ASPART 5 UNITS: 100 INJECTION, SOLUTION INTRAVENOUS; SUBCUTANEOUS at 22:06

## 2023-12-24 RX ADMIN — AZITHROMYCIN MONOHYDRATE 500 MG: 500 INJECTION, POWDER, LYOPHILIZED, FOR SOLUTION INTRAVENOUS at 16:56

## 2023-12-24 RX ADMIN — SODIUM CHLORIDE: 9 INJECTION, SOLUTION INTRAVENOUS at 18:13

## 2023-12-24 RX ADMIN — INSULIN GLARGINE 40 UNITS: 100 INJECTION, SOLUTION SUBCUTANEOUS at 22:03

## 2023-12-24 RX ADMIN — CEFTRIAXONE SODIUM 2 G: 2 INJECTION, POWDER, FOR SOLUTION INTRAMUSCULAR; INTRAVENOUS at 16:15

## 2023-12-24 RX ADMIN — TAMSULOSIN HYDROCHLORIDE 0.4 MG: 0.4 CAPSULE ORAL at 21:29

## 2023-12-24 RX ADMIN — INSULIN ASPART 6 UNITS: 100 INJECTION, SOLUTION INTRAVENOUS; SUBCUTANEOUS at 19:02

## 2023-12-24 RX ADMIN — DULOXETINE HYDROCHLORIDE 30 MG: 30 CAPSULE, DELAYED RELEASE ORAL at 21:29

## 2023-12-24 ASSESSMENT — ACTIVITIES OF DAILY LIVING (ADL)
ADLS_ACUITY_SCORE: 37
ADLS_ACUITY_SCORE: 37
ADLS_ACUITY_SCORE: 35
ADLS_ACUITY_SCORE: 37
ADLS_ACUITY_SCORE: 37

## 2023-12-24 NOTE — PROGRESS NOTES
.RECEIVING UNIT ED HANDOFF REVIEW    ED Nurse Handoff Report was reviewed by: Srinivas Yeh RN on December 24, 2023 at 5:08 PM

## 2023-12-24 NOTE — ED TRIAGE NOTES
Arrives thru triage. Reports 24 hours of increased dizziness, some twitching in the hands, weakness to where he is dropping things. Stroke screen grossly NL in triage, no focal deficits.      Triage Assessment (Adult)       Row Name 12/24/23 1308          Triage Assessment    Airway WDL WDL        Respiratory WDL    Respiratory WDL WDL        Skin Circulation/Temperature WDL    Skin Circulation/Temperature WDL X  psoriasis all over        Cardiac WDL    Cardiac WDL WDL        Peripheral/Neurovascular WDL    Peripheral Neurovascular WDL WDL        Cognitive/Neuro/Behavioral WDL    Cognitive/Neuro/Behavioral WDL WDL

## 2023-12-24 NOTE — ED NOTES
"[facility]  ED Nurse Handoff Report    ED Chief complaint: Dizziness and One-sided Weakness      ED Diagnosis:   Final diagnoses:   Pneumonia of left upper lobe due to infectious organism       Code Status: To be determined by admitting provider    Allergies:   Allergies   Allergen Reactions    Amoxicillin Diarrhea    Cephalexin Diarrhea    Liraglutide Other (See Comments), Headache and Unknown     Other reaction(s): Headache, Unknown  PN: migraines - Increased migraine frequency and severity      Sulfa Antibiotics Angioedema and Swelling     Pt has taken  Taken sulfa drugs orally without trouble. He had problems with Sulfa eye drops. Eye swelled up         Patient Story: Pt came in from home, reports increased weakness, especially in BUE where is notes intermittent spasms and repeatedly dropping things. Reports having difficulty staying awake recently and increased SOB. Pt has complicated hx, chronic pain with pain pump, severe plaque psoriasis and follows outpt with dermatology.  Focused Assessment:  Alert at times, frequently somnolent and will fall asleep during conversation. Wakes easily and is oriented. Up with SBA with cane. VSS on 2L initially, now on RA. Psoriatic rash present BUE/BLE. Intermittent spasms noted in BUE. BLE rosaura, emematous. Endorses pain \"but nothing worse than normal\". Attempted to void @ 1700, unable. PVR 212cc.    Treatments and/or interventions provided: Labs, covid swab, UA, IV abx.  Patient's response to treatments and/or interventions: Tbd    To be done/followed up on inpatient unit:  Admission orders    Does this patient have any cognitive concerns?:  Oriented x 4    Activity level - Baseline/Home:  Independent  Activity Level - Current:   Stand with Assist    Patient's Preferred language: English   Needed?: No    Isolation: None  Infection: Not Applicable  Patient tested for COVID 19 prior to admission: YES  Bariatric?: Yes    Vital Signs:   Vitals:    " "12/24/23 1308   BP: 113/58   Pulse: 92   Resp: 16   Temp: 98.2  F (36.8  C)   TempSrc: Temporal   SpO2: (!) 84%   Weight: 118.8 kg (262 lb)   Height: 1.676 m (5' 6\")       Cardiac Rhythm:     Was the PSS-3 completed:   Yes  What interventions are required if any?               Family Comments: Pt notified family  OBS brochure/video discussed/provided to patient/family: N/A              Name of person given brochure if not patient: na              Relationship to patient: na    For the majority of the shift this patient's behavior was Green.   Behavioral interventions performed were na.    ED NURSE PHONE NUMBER: [phone]        "

## 2023-12-24 NOTE — PHARMACY-ADMISSION MEDICATION HISTORY
Pharmacist Admission Medication History    Admission medication history is complete. The information provided in this note is only as accurate as the sources available at the time of the update.    Information Source(s): Patient via in-person    Pertinent Information:     Changes made to PTA medication list:  Added: None  Deleted: Ozempic, Flexeril, Temovate, Amlactin  Changed: None    Medication Affordability:     Medication History Completed By: Neyda Hernandez RPH 12/24/2023 4:17 PM    PTA Med List   Medication Sig Last Dose    acetaminophen (TYLENOL) 500 MG tablet Take 1,000 mg by mouth every 8 hours as needed 12/24/2023    aspirin (ASPIRIN LOW DOSE) 81 MG tablet Take 1 tablet (81 mg) by mouth daily 12/24/2023    atorvastatin (LIPITOR) 20 MG tablet Take 1.5 tablets (30 mg) by mouth daily 12/24/2023    Bioflavonoid Products (CANDIDO-C) TABS Take 1,000 mg by mouth daily 12/24/2023    buPROPion (WELLBUTRIN XL) 150 MG 24 hr tablet Take 1 tablet (150 mg) by mouth every morning 12/24/2023    calcium carbonate (OS-LUIS) 1500 (600 Ca) MG tablet Take 600 mg by mouth daily 12/24/2023    cariprazine (VRAYLAR) 4.5 MG capsule Take 1 capsule (4.5 mg) by mouth daily 12/24/2023    DULoxetine (CYMBALTA) 30 MG capsule Take 1 capsule (30 mg) by mouth 2 times daily 12/24/2023    Folic Acid-Vit B6-Vit B12 0.5-5-0.2 MG TABS Take 1 tablet by mouth daily 12/24/2023    glucose 40 % (400 mg/mL) gel Take by mouth every 15 minutes as needed for low blood sugar     insulin glargine (LANTUS PEN) 100 UNIT/ML pen Inject 40 Units Subcutaneous at bedtime 12/23/2023    loperamide (IMODIUM) 2 MG capsule Take 1 capsule (2 mg) by mouth 4 times daily as needed for diarrhea     losartan (COZAAR) 100 MG tablet Take 1 tablet (100 mg) by mouth daily 12/24/2023    metFORMIN (GLUCOPHAGE XR) 500 MG 24 hr tablet Take 2 tablets (1,000 mg) by mouth 2 times daily (with meals) 12/24/2023    metoprolol succinate ER (TOPROL XL) 100 MG 24 hr tablet Take 1 tablet (100 mg)  by mouth daily 12/24/2023    modafinil (PROVIGIL) 200 MG tablet Take 2 tablets (400 mg) by mouth daily Pt has increased his dose to 400mg daily from 300mg daily.  Has a message in to his physician for a dose increase. 12/24/2023    mometasone (ELOCON) 0.1 % external cream Apply topically daily as needed For psoriasis on face prn    multivitamin w/minerals (THERA-VIT-M) tablet Take 1 tablet by mouth daily Men's 50+ 12/24/2023    nitroGLYcerin (NITROSTAT) 0.4 MG sublingual tablet Place 1 tablet (0.4 mg) under the tongue every 5 minutes as needed for chest pain For chest pain place 1 tablet under the tongue every 5 minutes for 3 doses. If symptoms persist 5 minutes after 1st dose call 911.     pregabalin (LYRICA) 100 MG capsule Take 1 capsule (100 mg) by mouth daily as needed (In addition to 100mg 3 times per day scheduled) prn    pregabalin (LYRICA) 100 MG capsule Take 1 capsule (100 mg) by mouth 3 times daily 12/24/2023    senna-docusate (SENOKOT-S/PERICOLACE) 8.6-50 MG tablet Take 1-2 tablets by mouth 2 times daily as needed     SUMAtriptan (IMITREX) 50 MG tablet Take 1 tablet (50 mg) by mouth at onset of headache for migraine May repeat in 2 hours. Max 4 tablets/24 hours.     tamsulosin (FLOMAX) 0.4 MG capsule Take 1 capsule (0.4 mg) by mouth 2 times daily 12/24/2023    testosterone (ANDROGEL/TESTIM) 50 MG/5GM (1%) topical gel Place 1 packet (50 mg of testosterone) onto the skin daily 12/24/2023    vitamin B-Complex Take 3 tablets by mouth daily 12/24/2023    vitamin D3 (CHOLECALCIFEROL) 50 mcg (2000 units) tablet Take 1 tablet (50 mcg) by mouth daily 12/24/2023
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
## 2023-12-25 ENCOUNTER — MYC MEDICAL ADVICE (OUTPATIENT)
Dept: FAMILY MEDICINE | Facility: CLINIC | Age: 68
End: 2023-12-25
Payer: COMMERCIAL

## 2023-12-25 ENCOUNTER — APPOINTMENT (OUTPATIENT)
Dept: PHYSICAL THERAPY | Facility: CLINIC | Age: 68
DRG: 193 | End: 2023-12-25
Attending: INTERNAL MEDICINE
Payer: COMMERCIAL

## 2023-12-25 DIAGNOSIS — E11.69 TYPE 2 DIABETES MELLITUS WITH OTHER SPECIFIED COMPLICATION, WITHOUT LONG-TERM CURRENT USE OF INSULIN (H): Primary | ICD-10-CM

## 2023-12-25 LAB
ALBUMIN SERPL BCG-MCNC: 3.8 G/DL (ref 3.5–5.2)
ALLEN'S TEST: YES
ALP SERPL-CCNC: 103 U/L (ref 40–150)
ALT SERPL W P-5'-P-CCNC: 35 U/L (ref 0–70)
AMMONIA PLAS-SCNC: 29 UMOL/L (ref 16–60)
ANION GAP SERPL CALCULATED.3IONS-SCNC: 7 MMOL/L (ref 7–15)
ANION GAP SERPL CALCULATED.3IONS-SCNC: 8 MMOL/L (ref 7–15)
AST SERPL W P-5'-P-CCNC: 24 U/L (ref 0–45)
BASE EXCESS BLDA CALC-SCNC: 4.2 MMOL/L (ref -9–1.8)
BASE EXCESS BLDV CALC-SCNC: 4.5 MMOL/L (ref -7.7–1.9)
BASOPHILS # BLD AUTO: 0 10E3/UL (ref 0–0.2)
BASOPHILS NFR BLD AUTO: 0 %
BILIRUB SERPL-MCNC: 0.4 MG/DL
BUN SERPL-MCNC: 20.6 MG/DL (ref 8–23)
BUN SERPL-MCNC: 30.6 MG/DL (ref 8–23)
CALCIUM SERPL-MCNC: 8.3 MG/DL (ref 8.8–10.2)
CALCIUM SERPL-MCNC: 8.7 MG/DL (ref 8.8–10.2)
CHLORIDE SERPL-SCNC: 98 MMOL/L (ref 98–107)
CHLORIDE SERPL-SCNC: 99 MMOL/L (ref 98–107)
CREAT SERPL-MCNC: 0.91 MG/DL (ref 0.67–1.17)
CREAT SERPL-MCNC: 1.07 MG/DL (ref 0.67–1.17)
CREAT UR-MCNC: 66 MG/DL
DEPRECATED HCO3 PLAS-SCNC: 26 MMOL/L (ref 22–29)
DEPRECATED HCO3 PLAS-SCNC: 32 MMOL/L (ref 22–29)
EGFRCR SERPLBLD CKD-EPI 2021: 76 ML/MIN/1.73M2
EGFRCR SERPLBLD CKD-EPI 2021: >90 ML/MIN/1.73M2
EOSINOPHIL # BLD AUTO: 0.1 10E3/UL (ref 0–0.7)
EOSINOPHIL NFR BLD AUTO: 1 %
ERYTHROCYTE [DISTWIDTH] IN BLOOD BY AUTOMATED COUNT: 13.6 % (ref 10–15)
FOLATE SERPL-MCNC: 29.9 NG/ML (ref 4.6–34.8)
FRACT EXCRET NA UR+SERPL-RTO: 0.7 %
GLUCOSE BLDC GLUCOMTR-MCNC: 205 MG/DL (ref 70–99)
GLUCOSE BLDC GLUCOMTR-MCNC: 238 MG/DL (ref 70–99)
GLUCOSE BLDC GLUCOMTR-MCNC: 267 MG/DL (ref 70–99)
GLUCOSE BLDC GLUCOMTR-MCNC: 289 MG/DL (ref 70–99)
GLUCOSE BLDC GLUCOMTR-MCNC: 290 MG/DL (ref 70–99)
GLUCOSE BLDC GLUCOMTR-MCNC: 300 MG/DL (ref 70–99)
GLUCOSE BLDC GLUCOMTR-MCNC: 313 MG/DL (ref 70–99)
GLUCOSE SERPL-MCNC: 211 MG/DL (ref 70–99)
GLUCOSE SERPL-MCNC: 257 MG/DL (ref 70–99)
HCO3 BLD-SCNC: 32 MMOL/L (ref 21–28)
HCO3 BLDV-SCNC: 33 MMOL/L (ref 21–28)
HCT VFR BLD AUTO: 37.7 % (ref 40–53)
HGB BLD-MCNC: 12.7 G/DL (ref 13.3–17.7)
IMM GRANULOCYTES # BLD: 0 10E3/UL
IMM GRANULOCYTES NFR BLD: 1 %
LACTATE SERPL-SCNC: 1.9 MMOL/L (ref 0.7–2)
LYMPHOCYTES # BLD AUTO: 0.7 10E3/UL (ref 0.8–5.3)
LYMPHOCYTES NFR BLD AUTO: 10 %
MCH RBC QN AUTO: 32.9 PG (ref 26.5–33)
MCHC RBC AUTO-ENTMCNC: 33.7 G/DL (ref 31.5–36.5)
MCV RBC AUTO: 98 FL (ref 78–100)
MONOCYTES # BLD AUTO: 0.3 10E3/UL (ref 0–1.3)
MONOCYTES NFR BLD AUTO: 4 %
NEUTROPHILS # BLD AUTO: 6 10E3/UL (ref 1.6–8.3)
NEUTROPHILS NFR BLD AUTO: 84 %
NRBC # BLD AUTO: 0 10E3/UL
NRBC BLD AUTO-RTO: 0 /100
O2/TOTAL GAS SETTING VFR VENT: 0 %
O2/TOTAL GAS SETTING VFR VENT: 40 %
OXYHGB MFR BLDV: 55 % (ref 70–75)
PCO2 BLD: 60 MM HG (ref 35–45)
PCO2 BLDV: 65 MM HG (ref 40–50)
PH BLD: 7.33 [PH] (ref 7.35–7.45)
PH BLDV: 7.31 [PH] (ref 7.32–7.43)
PLATELET # BLD AUTO: 127 10E3/UL (ref 150–450)
PO2 BLD: 105 MM HG (ref 80–105)
PO2 BLDV: 31 MM HG (ref 25–47)
POTASSIUM SERPL-SCNC: 4 MMOL/L (ref 3.4–5.3)
POTASSIUM SERPL-SCNC: 4.1 MMOL/L (ref 3.4–5.3)
PROT SERPL-MCNC: 6.1 G/DL (ref 6.4–8.3)
RBC # BLD AUTO: 3.86 10E6/UL (ref 4.4–5.9)
SODIUM SERPL-SCNC: 133 MMOL/L (ref 135–145)
SODIUM SERPL-SCNC: 137 MMOL/L (ref 135–145)
SODIUM UR-SCNC: 60 MMOL/L
VIT B12 SERPL-MCNC: 1183 PG/ML (ref 232–1245)
WBC # BLD AUTO: 7.2 10E3/UL (ref 4–11)

## 2023-12-25 PROCEDURE — 99232 SBSQ HOSP IP/OBS MODERATE 35: CPT | Performed by: INTERNAL MEDICINE

## 2023-12-25 PROCEDURE — 36600 WITHDRAWAL OF ARTERIAL BLOOD: CPT

## 2023-12-25 PROCEDURE — 84425 ASSAY OF VITAMIN B-1: CPT | Performed by: PSYCHIATRY & NEUROLOGY

## 2023-12-25 PROCEDURE — 250N000011 HC RX IP 250 OP 636: Mod: JZ | Performed by: INTERNAL MEDICINE

## 2023-12-25 PROCEDURE — 258N000003 HC RX IP 258 OP 636: Performed by: INTERNAL MEDICINE

## 2023-12-25 PROCEDURE — 83605 ASSAY OF LACTIC ACID: CPT | Performed by: INTERNAL MEDICINE

## 2023-12-25 PROCEDURE — 82746 ASSAY OF FOLIC ACID SERUM: CPT | Performed by: PSYCHIATRY & NEUROLOGY

## 2023-12-25 PROCEDURE — 84300 ASSAY OF URINE SODIUM: CPT | Performed by: INTERNAL MEDICINE

## 2023-12-25 PROCEDURE — 5A09357 ASSISTANCE WITH RESPIRATORY VENTILATION, LESS THAN 24 CONSECUTIVE HOURS, CONTINUOUS POSITIVE AIRWAY PRESSURE: ICD-10-PCS | Performed by: INTERNAL MEDICINE

## 2023-12-25 PROCEDURE — 82805 BLOOD GASES W/O2 SATURATION: CPT | Performed by: INTERNAL MEDICINE

## 2023-12-25 PROCEDURE — 120N000001 HC R&B MED SURG/OB

## 2023-12-25 PROCEDURE — 250N000013 HC RX MED GY IP 250 OP 250 PS 637: Performed by: INTERNAL MEDICINE

## 2023-12-25 PROCEDURE — 36415 COLL VENOUS BLD VENIPUNCTURE: CPT | Performed by: INTERNAL MEDICINE

## 2023-12-25 PROCEDURE — 82607 VITAMIN B-12: CPT | Performed by: PSYCHIATRY & NEUROLOGY

## 2023-12-25 PROCEDURE — 94660 CPAP INITIATION&MGMT: CPT

## 2023-12-25 PROCEDURE — 82140 ASSAY OF AMMONIA: CPT | Performed by: PSYCHIATRY & NEUROLOGY

## 2023-12-25 PROCEDURE — 84446 ASSAY OF VITAMIN E: CPT | Performed by: PSYCHIATRY & NEUROLOGY

## 2023-12-25 PROCEDURE — 36415 COLL VENOUS BLD VENIPUNCTURE: CPT | Performed by: PSYCHIATRY & NEUROLOGY

## 2023-12-25 PROCEDURE — 80053 COMPREHEN METABOLIC PANEL: CPT | Performed by: INTERNAL MEDICINE

## 2023-12-25 PROCEDURE — 85025 COMPLETE CBC W/AUTO DIFF WBC: CPT | Performed by: INTERNAL MEDICINE

## 2023-12-25 PROCEDURE — 999N000157 HC STATISTIC RCP TIME EA 10 MIN

## 2023-12-25 PROCEDURE — 97530 THERAPEUTIC ACTIVITIES: CPT | Mod: GP

## 2023-12-25 PROCEDURE — 97161 PT EVAL LOW COMPLEX 20 MIN: CPT | Mod: GP

## 2023-12-25 PROCEDURE — 250N000011 HC RX IP 250 OP 636: Performed by: INTERNAL MEDICINE

## 2023-12-25 RX ORDER — LOSARTAN POTASSIUM 100 MG/1
100 TABLET ORAL DAILY
Status: DISCONTINUED | OUTPATIENT
Start: 2023-12-26 | End: 2023-12-26 | Stop reason: HOSPADM

## 2023-12-25 RX ORDER — ENOXAPARIN SODIUM 100 MG/ML
40 INJECTION SUBCUTANEOUS EVERY 24 HOURS
Status: DISCONTINUED | OUTPATIENT
Start: 2023-12-25 | End: 2023-12-25

## 2023-12-25 RX ORDER — ENOXAPARIN SODIUM 100 MG/ML
40 INJECTION SUBCUTANEOUS EVERY 12 HOURS
Status: DISCONTINUED | OUTPATIENT
Start: 2023-12-25 | End: 2023-12-26 | Stop reason: HOSPADM

## 2023-12-25 RX ORDER — SODIUM CHLORIDE 9 MG/ML
INJECTION, SOLUTION INTRAVENOUS CONTINUOUS
Status: DISCONTINUED | OUTPATIENT
Start: 2023-12-25 | End: 2023-12-26

## 2023-12-25 RX ADMIN — MODAFINIL 400 MG: 200 TABLET ORAL at 08:58

## 2023-12-25 RX ADMIN — OXYCODONE HYDROCHLORIDE 5 MG: 5 TABLET ORAL at 08:59

## 2023-12-25 RX ADMIN — ATORVASTATIN CALCIUM 30 MG: 20 TABLET, FILM COATED ORAL at 08:09

## 2023-12-25 RX ADMIN — METOPROLOL SUCCINATE 100 MG: 100 TABLET, EXTENDED RELEASE ORAL at 08:09

## 2023-12-25 RX ADMIN — INSULIN ASPART 5 UNITS: 100 INJECTION, SOLUTION INTRAVENOUS; SUBCUTANEOUS at 08:10

## 2023-12-25 RX ADMIN — INSULIN ASPART 1 UNITS: 100 INJECTION, SOLUTION INTRAVENOUS; SUBCUTANEOUS at 22:04

## 2023-12-25 RX ADMIN — CARIPRAZINE 4.5 MG: 4.5 CAPSULE, GELATIN COATED ORAL at 08:10

## 2023-12-25 RX ADMIN — DULOXETINE HYDROCHLORIDE 30 MG: 30 CAPSULE, DELAYED RELEASE ORAL at 08:09

## 2023-12-25 RX ADMIN — OXYCODONE HYDROCHLORIDE 5 MG: 5 TABLET ORAL at 14:15

## 2023-12-25 RX ADMIN — LOPERAMIDE HYDROCHLORIDE 2 MG: 2 CAPSULE ORAL at 20:24

## 2023-12-25 RX ADMIN — ENOXAPARIN SODIUM 40 MG: 40 INJECTION SUBCUTANEOUS at 18:10

## 2023-12-25 RX ADMIN — INSULIN ASPART 6 UNITS: 100 INJECTION, SOLUTION INTRAVENOUS; SUBCUTANEOUS at 18:10

## 2023-12-25 RX ADMIN — CEFTRIAXONE SODIUM 2 G: 2 INJECTION, POWDER, FOR SOLUTION INTRAMUSCULAR; INTRAVENOUS at 16:05

## 2023-12-25 RX ADMIN — OXYCODONE HYDROCHLORIDE 5 MG: 5 TABLET ORAL at 23:21

## 2023-12-25 RX ADMIN — AZITHROMYCIN MONOHYDRATE 500 MG: 500 INJECTION, POWDER, LYOPHILIZED, FOR SOLUTION INTRAVENOUS at 16:45

## 2023-12-25 RX ADMIN — INSULIN ASPART 7 UNITS: 100 INJECTION, SOLUTION INTRAVENOUS; SUBCUTANEOUS at 12:12

## 2023-12-25 RX ADMIN — ACETAMINOPHEN 1000 MG: 500 TABLET, FILM COATED ORAL at 23:21

## 2023-12-25 RX ADMIN — SODIUM CHLORIDE: 9 INJECTION, SOLUTION INTRAVENOUS at 02:23

## 2023-12-25 RX ADMIN — DULOXETINE HYDROCHLORIDE 30 MG: 30 CAPSULE, DELAYED RELEASE ORAL at 20:24

## 2023-12-25 RX ADMIN — OXYCODONE HYDROCHLORIDE AND ACETAMINOPHEN 1000 MG: 500 TABLET ORAL at 08:09

## 2023-12-25 RX ADMIN — OXYCODONE HYDROCHLORIDE 5 MG: 5 TABLET ORAL at 19:29

## 2023-12-25 RX ADMIN — DOCUSATE SODIUM 50 MG AND SENNOSIDES 8.6 MG 2 TABLET: 8.6; 5 TABLET, FILM COATED ORAL at 10:24

## 2023-12-25 RX ADMIN — Medication 1 TABLET: at 08:09

## 2023-12-25 RX ADMIN — INSULIN GLARGINE 40 UNITS: 100 INJECTION, SOLUTION SUBCUTANEOUS at 22:03

## 2023-12-25 RX ADMIN — TAMSULOSIN HYDROCHLORIDE 0.4 MG: 0.4 CAPSULE ORAL at 08:09

## 2023-12-25 RX ADMIN — PREGABALIN 100 MG: 100 CAPSULE ORAL at 22:03

## 2023-12-25 RX ADMIN — BUPROPION HYDROCHLORIDE 150 MG: 150 TABLET, EXTENDED RELEASE ORAL at 08:10

## 2023-12-25 RX ADMIN — ASPIRIN 81 MG: 81 TABLET, COATED ORAL at 08:09

## 2023-12-25 RX ADMIN — Medication 600 MG: at 08:10

## 2023-12-25 RX ADMIN — SODIUM CHLORIDE: 9 INJECTION, SOLUTION INTRAVENOUS at 22:42

## 2023-12-25 RX ADMIN — TAMSULOSIN HYDROCHLORIDE 0.4 MG: 0.4 CAPSULE ORAL at 20:24

## 2023-12-25 ASSESSMENT — ACTIVITIES OF DAILY LIVING (ADL)
ADLS_ACUITY_SCORE: 40
ADLS_ACUITY_SCORE: 37
ADLS_ACUITY_SCORE: 39
ADLS_ACUITY_SCORE: 40
ADLS_ACUITY_SCORE: 40
ADLS_ACUITY_SCORE: 37
ADLS_ACUITY_SCORE: 40

## 2023-12-25 NOTE — PROGRESS NOTES
"   12/25/23 1500   Appointment Info   Signing Clinician's Name / Credentials (PT) Aashish Shah PT, DPT       Present no   Living Environment   People in Home alone   Current Living Arrangements apartment   Home Accessibility no concerns   Transportation Anticipated car, drives self   Living Environment Comments Pt lives alone in an apartment. no stairs to navigate   Self-Care   Usual Activity Tolerance good   Current Activity Tolerance moderate   Regular Exercise No   Equipment Currently Used at Home none   Fall history within last six months no   Activity/Exercise/Self-Care Comment Patient reporting that they are typically IND with mobility and ADLs at baseline. Owns SEC and 4WW and has used them in the past, but has not been using them recently. Pt works full time and drives at baseline.   General Information   Onset of Illness/Injury or Date of Surgery 12/24/23   Referring Physician Nicolás Ji MD   Patient/Family Therapy Goals Statement (PT) return home, decrease pain   Pertinent History of Current Problem (include personal factors and/or comorbidities that impact the POC) Per chart review, \"This is a 68-year-old male with history of amyloidosis, anxiety, depression, psoriasis, bipolar 1 disorder, hypertension, diabetes mellitus type 2, hyperlipidemia, MEJIAS with liver cirrhosis, restless leg syndrome, polyneuropathy, obstructive sleep apnea not using CPAP for the last 6 months, chronic pain with pain pump containing morphine and fentanyl come to the ER with complaint plaint of generalized weakness, dizziness, lightheadedness, dropping things easily, sometimes difficulty with word findings, twitching and not feeling well.\"   Cognition   Affect/Mental Status (Cognition) WFL   Orientation Status (Cognition) oriented x 4   Follows Commands (Cognition) WFL   Cognitive Status Comments Patient mildly tangential with conversation   Integumentary/Edema   Integumentary/Edema other " (describe)   Integumentary/Edema Comments Patient with patchy erythema and dryness in bilateral lower extremities from prexisting skin condition   Posture    Posture Forward head position;Protracted shoulders   Range of Motion (ROM)   Range of Motion ROM is WFL   Strength (Manual Muscle Testing)   Strength (Manual Muscle Testing) strength is WFL   Bed Mobility   Comment, (Bed Mobility) IND supine>sit   Transfers   Comment, (Transfers) SBA sit>stand w/ no AD   Gait/Stairs (Locomotion)   Distance in Feet (Gait) 10' eval   Comment, (Gait/Stairs) pt ambulated 10' w/ no AD w/ SBA for eval   Balance   Balance Comments mild dynamic balance impairment   Sensory Examination   Sensory Perception patient reports no sensory changes   Clinical Impression   Criteria for Skilled Therapeutic Intervention Yes, treatment indicated   PT Diagnosis (PT) impaired functional mobility   Influenced by the following impairments impaired activity tolerance and mild dynamic balance impairment   Functional limitations due to impairments limited functional endurance   Clinical Presentation (PT Evaluation Complexity) stable   Clinical Presentation Rationale clinical judgement   Clinical Decision Making (Complexity) low complexity   Planned Therapy Interventions (PT) balance training;bed mobility training;gait training;home exercise program;patient/family education;ROM (range of motion);strengthening;transfer training;progressive activity/exercise;home program guidelines   Risk & Benefits of therapy have been explained evaluation/treatment results reviewed;care plan/treatment goals reviewed;risks/benefits reviewed;current/potential barriers reviewed;participants voiced agreement with care plan;participants included;patient   PT Total Evaluation Time   PT Eval, Low Complexity Minutes (19458) 20   Physical Therapy Goals   PT Frequency One time eval and treatment only   PT Predicted Duration/Target Date for Goal Attainment 12/25/23   PT Goals  Transfers;Gait;Bed Mobility   PT: Bed Mobility Independent;Supine to/from sit;Goal Met;Completed   PT: Transfers Independent;Sit to/from stand;Bed to/from chair;Goal Met;Completed   PT: Gait Independent;Greater than 200 feet;Goal Met;Completed   Interventions   Interventions Quick Adds Therapeutic Activity   Therapeutic Activity   Therapeutic Activities: dynamic activities to improve functional performance Minutes (55612) 15   Symptoms Noted During/After Treatment Fatigue   Treatment Detail/Skilled Intervention Greeted pt upon arrival to room. Pt agreeable to working with PT. Supine>sit IND, patient able to don shoes and socks IND while seated at edge of bed. Sit>stand w/ SEC and SBA. Pt ambulated w/ SBA and SEC to toilet, demonstrating wide base of support and no loss of balance. Performed toilet transfer and cyndy cares IND. Sit>stand mod I from toilet w/ use of grab bar. Pt ambulated 250' in the hallway w/ no AD and SBA initially, progressing to IND. Demonstrating slow gait speed throughout, wide base of support, but good overall stability with no loss of balance throughout. Noting moderate increase in fatigue and SOB with mobility. Education provided regarding use of ambulation program following discharge for continued activity tolerance improvements following discharge. Pt left with all needs met and call light within reach. RN present in room at end of session.   PT Discharge Planning   PT Plan goals met, d/c   PT Discharge Recommendation (DC Rec) home   PT Rationale for DC Rec Patient appears to be at baseline mobility levels. Able to ambulate 100's of feet independently without use of assistive device. Patient does not need to navigate stairs at home and owns 4WW and cane. Patient plans to follow up with outpatient PT for shoulder, low back, and knee rehab. Inpatient goals met at this time.   PT Brief overview of current status IND   Total Session Time   Timed Code Treatment Minutes 15   Total Session Time  (sum of timed and untimed services) 35

## 2023-12-25 NOTE — PLAN OF CARE
Goal Outcome Evaluation:  Date/Time 12/25/23  1530  Medical  Diagnosis: Weakness, LLL pneumonia   POD#: n/a  Mental Status: a&o  Activity/dangle up with cane  Diet: CHO diet  Pain: denied  Parker/Voiding: BR  Tele/Restraints/Iso: Tele - NSR  02/LDA: IV infusing, RA  D/C Date: TBD  Other Info: Autoimmune disease, skin peeling, scab, flaky christina LLE, christina UE, K+ protocol K+ today 4.0

## 2023-12-25 NOTE — PLAN OF CARE
Physical Therapy Discharge Summary    Reason for therapy discharge:    All goals and outcomes met, no further needs identified.    Progress towards therapy goal(s). See goals on Care Plan in [facility] electronic health record for goal details.  Goals met    Therapy recommendation(s):    Patient appears to be at baseline mobility levels. Able to ambulate 100's of feet independently without use of assistive device. Patient does not need to navigate stairs at home and owns 4WW and cane. Patient plans to follow up with outpatient PT for shoulder, low back, and knee rehab. Inpatient goals met at this time. Per PT screening, no inpatient OT needs identified. Patient able to independently don socks and shoes and perform toiletting and toilet transfers independently.

## 2023-12-25 NOTE — PROGRESS NOTES
[facility]    Medicine Progress Note - Hospitalist Service    Date of Admission:  12/24/2023    Assessment & Plan   This is a 68-year-old male with history of amyloidosis, anxiety, depression, psoriasis, bipolar 1 disorder, hypertension, diabetes mellitus type 2, hyperlipidemia, MEJIAS with liver cirrhosis, restless leg syndrome, polyneuropathy, obstructive sleep apnea not using CPAP for the last 6 months, chronic pain with pain pump containing morphine and fentanyl come to the ER with complaint plaint of generalized weakness, dizziness, lightheadedness, dropping things easily, sometimes difficulty with word findings, twitching and not feeling well.    Left lower lobe community-acquired pneumonia  Acute hypoxic hypercapnic respiratory failure  Respiratory acidosis with metabolic alkalosis  He is with multiple comorbidities now presented with weakness and constellation of symptoms, found to be hypoxic saturation 80s on room air, chest x-ray shows left lower lobe opacity suspicious for infection.  Although he has no other symptoms of pneumonia including coughing chest pain or shortness of breath no fever or chills, but his white cell count is elevated to 15.4.  At this time we will treat him for possible pneumonia, blood cultures obtained, started on IV ceftriaxone and azithromycin   will continue with that.  will keep him on supplemental oxygen to keep saturation 90% above, incentive spirometry and flutter  BiPAP at night,  His hypoxic hypercapnic failure is multifactorial, as he is not using his CPAP for the last 6-month, recently increase in his morphine dosages and his morphine pump, renal failure with continue using of Lyrica and other sedating medication likely causing hypoxia and hypercapnia.   dose of Lyrica is lowered   may need to contact his pain clinic to see if they can adjust his pain pump, will be difficult over the long weekend or holidays.  But now overall he is improving    twitching has improved  He is very alert and awake  Able to answer my questions   Loading the dose of Lyrica and treatment of pneumonia has made improvement IV hydration helped also    Continue IV ceftriaxone for another day  Switching to oral tomorrow if he continue to improve      Acute renal failure  He is on IV hydration which has improved his renal function  Will continue IV hydration  Will lower the dose from 125 mL/h to 75 mm/h  Discontinue IVF tomorrow ( order is written  Avoid NSAIDs and nephrotoxic medication.    Generalized weakness  Clumsiness  Twitching/spasms/word finding difficulties  Hallucinations.      This symptoms are likely multifactorial due to pain pump and underlying infection as well as dehydration  They are improving with treatment of pneumonia and IV hydration and lowering the dose of Lyrica to 100 mg at night instead of 3 times a day  CT scan of the head changes of chronic microangiopathy without acute intracranial abnormality.  Will have PT and OT evaluate the patient   consult neurology to evaluate the patient.  Discussed the importance of compliance with CPAP machine      Diabetes mellitus type 2  Uncontrolled diabetes at this time, he is on Ozempic Lantus 40 units in the morning.  will continue with Lantus 40 in the morning   He is started  on high-dose sliding scale insulin for correction and hypoglycemia protocol.    Obstructive sleep apnea not compliant with CPAP  Restless leg syndrome  Polyneuropathy  Lost his CPAP machine 6 months ago and not using it, has been hypoxic when waking up in the morning as per the patient, he has some hypercapnia on VBG's here as well.   BiPAP at night,   continue Zyrtec at night   cut down the dose of Lyrica  from 100 mg 3 times a day to 400 mg once a day   continue with pain pump for now    Hypertension  Hyperlipidemia  He is on losartan 100 mg daily and metoprolol 100 mg daily and Lipitor.  Hold losartan giving renal failure continue metoprolol and  "Lipitor at this time.  Blood pressure overall is in good control.  As renal function is improving restart losartan tomorrow( order is written       Bipolar disorder type I  Anxiety and depression  OCD  He is on Wellbutrin, cariprazine,  continue with that.    BPH  On Flomax we will continue with that.    Plaque psoriasis:  He is followed by dermatologist as outpatient  Immunosuppressant medication helps him but he is not on that now  He has requested the company to give him free and he is expecting that in the next few days  His psoriasis is flaring up  Per patient nothing helps him except for that immunosuppressant medication  Will hold off on that due to his current infection    He is constipated:  Request  extra senna   Spoke to the nurse who is going to give it to him      Disposition: Expected discharge in 2 days once stable.            Diet: Moderate Consistent Carb (60 g CHO per Meal) Diet    DVT Prophylaxis: Enoxaparin (Lovenox) SQ  Parker Catheter: Not present  Lines: None     Cardiac Monitoring: ACTIVE order. Indication: Hypoxic respiratory failure  Code Status: Full Code      Clinically Significant Risk Factors Present on Admission                # Drug Induced Platelet Defect: home medication list includes an antiplatelet medication   # Hypertension: Noted on problem list     # DMII: A1C = N/A within past 6 months    # Severe Obesity: Estimated body mass index is 42.29 kg/m  as calculated from the following:    Height as of this encounter: 1.676 m (5' 6\").    Weight as of this encounter: 118.8 kg (262 lb).              Disposition Plan      Expected Discharge Date: 1 to 2 days pending clinical course                  Izabella Guillermo MD  Hospitalist Service  [facility]  Securely message with [employer] (more info)  Text page via [place] [facility] Paging/Directory   ______________________________________________________________________    Interval History   Felling burning sensation on his " body  No chest pain or sob   Having flare up of his plaque psoriasis  Nothing else works for him except for immunosuppressant  He is going to get in next few days  He has been very noncompliant with use of his CPAP  He does not have machine and cannot afford    Physical Exam   Vital Signs: Temp: 98  F (36.7  C) Temp src: Oral BP: 121/70 Pulse: 74   Resp: 18 SpO2: 92 % O2 Device: None (Room air) Oxygen Delivery: 14 LPM  Weight: 262 lbs 0 oz    He is alert awake oriented  He has extensive plaque psoriasis on his legs and arms  More on the legs than arms  He is having peripheral edema  He has dryness of the skin of both lower extremities and changes due to venous stasis dermatitis  He was able to answer question twitching has improved  He has psoriatic plaques on lower extremities and upper extremities and some on his trunk  It is worse on the lower extremities    Medical Decision Making       35 MINUTES SPENT BY ME on the date of service doing chart review, history, exam, documentation & further activities per the note.      Data     I have personally reviewed the following data over the past 24 hrs:    7.2  \   12.7 (L)   / 127 (L)     137 98 30.6 (H) /  238 (H)   4.0 32 (H) 1.07 \     ALT: 35 AST: 24 AP: 103 TBILI: 0.4   ALB: 3.8 TOT PROTEIN: 6.1 (L) LIPASE: N/A     Trop: 37 (H) BNP: 155     Procal: N/A CRP: N/A Lactic Acid: 1.9

## 2023-12-25 NOTE — PLAN OF CARE
Goal Outcome Evaluation:  Date/Time 12/24/23 1900  Medical  Diagnosis: Weakness, LLL pneumonia   POD#: n/a  Mental Status: a&o  Activity/dangle up with cane  Diet: CHO diet  Pain: denied  Parker/Voiding: DTV  Tele/Restraints/Iso: Tele  02/LDA: IV infusing, 2L NC  D/C Date: TBD  Other Info: Autoimmune disease, skin peeling, scab, flaky christina LLE, christina UE

## 2023-12-25 NOTE — CONSULTS
[facility]    Neurology Consultation     Date of Admission:  12/24/2023    Assessment & Plan   Parmjit Hernández is a 68 year old male who was admitted on 12/24/2023. I was asked to see the patient for weakness, hallucination, twitching, dropping things and clumsy hands sometimes word finding difficulties .  The patient is a 68-year-old gentleman who was admitted with encephalopathy very likely due to hypoxemia probable from not using the CPAP, very likely his narcotic use are playing a role as well.  The patient has findings of a neuropathy but what he describes with the hands and jerking movements might have represented asterixis very likely toxic from his narcotics along with acute renal failure.  Today I do not find this.  The patient has memory loss.    -Continue treatment of pneumonia and obstructive sleep apnea.  -Readjustments of the dosage of pain medication.  -Will check vitamin B12 folate, vitamin E  -EEG to exclude the possibility of seizures.  - the patient has a neurologist I would recommend to follow-up with him as an outpatient.      Sapphire Xiong MD    Code Status    Full Code    Reason for Consult   Reason for consult: I was asked by Dr Ji to evaluate this patient for weakness, hallucination, twitching, dropping things and clumsy hands sometimes word finding difficulties .    Primary Care Physician   Sherwin Mcdermott    Chief Complaint   Twitching, weakness, hallucintions    History is obtained from the patient and electronic health record    History of Present Illness   Parmjit Hernández is a 68 year old male who presents after being found confused while driving.  Apparently somebody called 911 police came and found the patient confused.  The patient decided to come to the emergency room after police stopped him.  In emergency room he was found to be hypoxic in the 80s.  The patient has obstructive sleep apnea however he states that he lost his CPAP machine  while moving, 6 months ago.  He has not been using the machine since then.  He has had some cough and nasal congestion for a few days.    The patient states that he has not been feeling well for at least a month.  He noticed more weakness and more pain in all 4 extremities.  He gets weakness in both hands and has been dropping things.  He has had difficulty finding words and has had on and off visual hallucinations.  The patient states that he sees a person in the corner of his eyes.  This has started after increasing the dose of morphine and the morphine pump.  The patient has both morphine and fentanyl in his pain pump and has been followed in our outpatient clinic.  He also takes as needed Percocet 10 mg and Flexeril.  He is also on Lyrica and Wellbutrin.    The patient has history of amyloidosis for which she was treated with Velcade.  He states that he has history of neuropathy following this treatment.  He does have also history of diabetes and psoriasis.    The patient denies smoking or drinking.  In the hospital he was found to have left lower lobe acquired pneumonia as well as acute renal failure.    Past Medical History   I have reviewed this patient's medical history and updated it with pertinent information if needed.   Past Medical History:   Diagnosis Date    Acquired lymphedema 2/4/2019    Allergic state     Amyloidosis (H)     followed by hematology Dr. Romeo status post peripheral stem cell transplant    Anxiety 5/11/2016    Anxiety and depression 12/18/2013    Bipolar I disorder (H) 9/1/2015    Under care of psychiatrist [facility]- Dr Rahman doxepin 25 mg, 2 cap bedtime DULoxetine 20 mg once daily  OLANZapine 7.5 mg  1 tab bedtime     DDD (degenerative disc disease), lumbar 8/6/2020    Depressive disorder 1995    EKG abnormality 4/20/2020    H/O bone marrow transplant (H)     Headache(784.0)     History of diverticulitis 1/27/2015    1/15-rxed with antibiotics     Hyperlipidemia LDL goal <100  10/31/2010    Hypertension 2007    Hypertension goal BP (blood pressure) < 140/90 10/11/2011    Marianne (H) 8/20/2015    Morbid obesity (H) 8/28/2018    Myalgia and myositis, unspecified     Narcotic dependence (H) 9/6/2016    None in about 6 months- 8/1/17    NSTEMI (non-ST elevated myocardial infarction) (H) 04/19/2020    OCD (obsessive compulsive disorder)     Other acquired absence of organ 94    Other cirrhosis of liver (H) possibly from vences  4/15/2020    Other specified viral warts     Peripheral edema 5/20/2018    Noncardiac Continue with  furosemide (LASIX) 20 MG tablet,  potassium       chloride SA (K-DUR/KLOR-CON M) 10 MEQ CR  Tab once daily  Basic metabolic panel    Polyneuropathy associated with underlying disease (H24) 2/4/2019    Restless legs syndrome (RLS) 6/29/2017    Stasis dermatitis of both legs 12/31/2018    Type 2 diabetes mellitus with other specified complication (H) 7/10/2017       Past Surgical History   I have reviewed this patient's surgical history and updated it with pertinent information if needed.  Past Surgical History:   Procedure Laterality Date    ABDOMEN SURGERY  2007    abdominal hernia    BACK SURGERY  8/6/2020    BIOPSY  june 2015    negative    BMT PROTOCOL      for systemic amyloidosis    BONE MARROW BIOPSY, BONE SPECIMEN, NEEDLE/TROCAR N/A 6/8/2016    Procedure: BIOPSY BONE MARROW;  Surgeon: Nathan Agrawal MD;  Location:  GI    BONE MARROW BIOPSY, BONE SPECIMEN, NEEDLE/TROCAR N/A 2/20/2019    Procedure: BIOPSY BONE MARROW;  Surgeon: Michael Raygoza MD;  Location:  GI    CHOLECYSTECTOMY  1995    lap qian    COLONOSCOPY  2012    hx polyps    ESOPHAGOSCOPY, GASTROSCOPY, DUODENOSCOPY (EGD), COMBINED N/A 5/29/2015    Procedure: COMBINED ESOPHAGOSCOPY, GASTROSCOPY, DUODENOSCOPY (EGD), BIOPSY SINGLE OR MULTIPLE;  Surgeon: John Jacob MD;  Location:  GI    HERNIA REPAIR, UMBILICAL  2006    Z NONSPECIFIC PROCEDURE  94    Cholecystectomy    [facility]  NONSPECIFIC PROCEDURE  2000    repair deviated septum       Prior to Admission Medications   Prior to Admission Medications   Prescriptions Last Dose Informant Patient Reported? Taking?   Bioflavonoid Products (CANDIDO-C) TABS 12/24/2023 Self No Yes   Sig: Take 1,000 mg by mouth daily   Continuous Blood Gluc  (FREESTYLE VERA 2 READER) MARAL   No No   Sig: Use to read blood sugars as per 's instructions.   Continuous Blood Gluc Sensor (FREESTYLE VERA 2 SENSOR) MISC   No No   Sig: Change every 14 days.   DULoxetine (CYMBALTA) 30 MG capsule 12/24/2023  No Yes   Sig: Take 1 capsule (30 mg) by mouth 2 times daily   Folic Acid-Vit B6-Vit B12 0.5-5-0.2 MG TABS 12/24/2023 Self No Yes   Sig: Take 1 tablet by mouth daily   POTASSIUM PO  Self Yes No   Sig: Take 1 tablet by mouth daily Unspecified tablet strength; patient estimated 600 mg, takes for leg cramps   SUMAtriptan (IMITREX) 50 MG tablet  Self No Yes   Sig: Take 1 tablet (50 mg) by mouth at onset of headache for migraine May repeat in 2 hours. Max 4 tablets/24 hours.   acetaminophen (TYLENOL) 500 MG tablet 12/24/2023 Self Yes Yes   Sig: Take 1,000 mg by mouth every 8 hours as needed   albuterol (PROAIR HFA/PROVENTIL HFA/VENTOLIN HFA) 108 (90 Base) MCG/ACT inhaler prn  No No   Sig: Inhale 2 puffs into the lungs every 6 hours as needed for shortness of breath, wheezing or cough   aspirin (ASPIRIN LOW DOSE) 81 MG tablet 12/24/2023 Self No Yes   Sig: Take 1 tablet (81 mg) by mouth daily   atorvastatin (LIPITOR) 20 MG tablet 12/24/2023  No Yes   Sig: Take 1.5 tablets (30 mg) by mouth daily   blood glucose (NO BRAND SPECIFIED) lancets standard   No No   Sig: Use to test blood sugar 1 time daily or as directed.   blood glucose (NO BRAND SPECIFIED) test strip   No No   Sig: Use to test blood sugar 1 time daily or as directed.   buPROPion (WELLBUTRIN XL) 150 MG 24 hr tablet 12/24/2023  No Yes   Sig: Take 1 tablet (150 mg) by mouth every morning   calcium  carbonate (OS-LUIS) 1500 (600 Ca) MG tablet 12/24/2023 Self Yes Yes   Sig: Take 600 mg by mouth daily   cariprazine (VRAYLAR) 4.5 MG capsule 12/24/2023  No Yes   Sig: Take 1 capsule (4.5 mg) by mouth daily   glucose 40 % (400 mg/mL) gel   No Yes   Sig: Take by mouth every 15 minutes as needed for low blood sugar   insulin glargine (LANTUS PEN) 100 UNIT/ML pen 12/23/2023  Yes Yes   Sig: Inject 40 Units Subcutaneous at bedtime   insulin pen needle (31G X 8 MM) 31G X 8 MM miscellaneous   No No   Sig: Use one pen needles daily or as directed.   loperamide (IMODIUM) 2 MG capsule  Self No Yes   Sig: Take 1 capsule (2 mg) by mouth 4 times daily as needed for diarrhea   losartan (COZAAR) 100 MG tablet 12/24/2023  No Yes   Sig: Take 1 tablet (100 mg) by mouth daily   medication given by implanted intrathecal pump  Other Yes No   Sig: continuous Drug # 1: Fentanyl (Sublimaze) - Conc: 2000 mcg/mL - Total Dose / 24 hours: 1663.3 mcg  Drug # 2: Bupivacaine (Marcaine)  - Conc: 20 mg/mL - Total Dose / 24 hours: 16.633 mg  Drug # 3: Morphine (Duramorph or Infumorph)  - Conc: 9 mg/mL - Total Dose / 24 hours: 7.484 mg    Rate: 0.0325 mL/hr  Pump Reservoir Volume: 40 mL  Pump Reservoir Alarm Volume: 2 ml  Outside Clinic & Provider: [facility]  Last Refill Date: 5/18/2023  Next Refill Date: 6/25/2023  Low [place] Alarm Date:  Pump : Medtronic SynchroMed II    Boluses: Up to 3 per day, 4 minute duration. Lock-out every 6 hours  Fentanyl: 110 mcg/dose  Bupivacaine: 1.1 mg/dose  Morphine: 0.49 mg/dose   metFORMIN (GLUCOPHAGE XR) 500 MG 24 hr tablet 12/24/2023  No Yes   Sig: Take 2 tablets (1,000 mg) by mouth 2 times daily (with meals)   metoprolol succinate ER (TOPROL XL) 100 MG 24 hr tablet 12/24/2023  No Yes   Sig: Take 1 tablet (100 mg) by mouth daily   modafinil (PROVIGIL) 200 MG tablet 12/24/2023  No Yes   Sig: Take 2 tablets (400 mg) by mouth daily Pt has increased his dose to 400mg daily from  300mg daily.  Has a message in to his physician for a dose increase.   mometasone (ELOCON) 0.1 % external cream prn  Yes Yes   Sig: Apply topically daily as needed For psoriasis on face   multivitamin w/minerals (THERA-VIT-M) tablet 12/24/2023 Self Yes Yes   Sig: Take 1 tablet by mouth daily Men's 50+   nitroGLYcerin (NITROSTAT) 0.4 MG sublingual tablet   No Yes   Sig: Place 1 tablet (0.4 mg) under the tongue every 5 minutes as needed for chest pain For chest pain place 1 tablet under the tongue every 5 minutes for 3 doses. If symptoms persist 5 minutes after 1st dose call 911.   pregabalin (LYRICA) 100 MG capsule prn  No Yes   Sig: Take 1 capsule (100 mg) by mouth daily as needed (In addition to 100mg 3 times per day scheduled)   pregabalin (LYRICA) 100 MG capsule 12/24/2023  No Yes   Sig: Take 1 capsule (100 mg) by mouth 3 times daily   senna-docusate (SENOKOT-S/PERICOLACE) 8.6-50 MG tablet  Self Yes Yes   Sig: Take 1-2 tablets by mouth 2 times daily as needed   tamsulosin (FLOMAX) 0.4 MG capsule 12/24/2023  No Yes   Sig: Take 1 capsule (0.4 mg) by mouth 2 times daily   testosterone (ANDROGEL/TESTIM) 50 MG/5GM (1%) topical gel 12/24/2023  No Yes   Sig: Place 1 packet (50 mg of testosterone) onto the skin daily   vitamin B-Complex 12/24/2023 Self Yes Yes   Sig: Take 3 tablets by mouth daily   vitamin D3 (CHOLECALCIFEROL) 50 mcg (2000 units) tablet 12/24/2023  No Yes   Sig: Take 1 tablet (50 mcg) by mouth daily      Facility-Administered Medications: None     Allergies   Allergies   Allergen Reactions    Amoxicillin Diarrhea    Cephalexin Diarrhea    Liraglutide Other (See Comments), Headache and Unknown     Other reaction(s): Headache, Unknown  PN: migraines - Increased migraine frequency and severity      Sulfa Antibiotics Angioedema and Swelling     Pt has taken  Taken sulfa drugs orally without trouble. He had problems with Sulfa eye drops. Eye swelled up         Social History   I have reviewed this patient's  social history and updated it with pertinent information if needed. Parmjit Hernández  reports that he has never smoked. He has never used smokeless tobacco. He reports that he does not currently use alcohol. He reports that he does not currently use drugs after having used the following drugs: Cocaine, Marijuana, Methamphetamines, Opiates, IV, Amphetamines, Barbiturates, Benzodiazepines, Codeine, Fentanyl, Hashish, Hydrocodone, Hydromorphone, LSD, and Oxycodone.    Family History   I have reviewed this patient's family history and updated it with pertinent information if needed.   Family History   Problem Relation Age of Onset    Cerebrovascular Disease Mother     C.A.D. Mother     Hypertension Mother     Alcohol/Drug Mother     Arthritis Mother     Cerebrovascular Disease Maternal Grandmother     C.A.D. Maternal Grandmother     Alcohol/Drug Maternal Grandmother     C.A.D. Father     Heart Disease Father     Hypertension Father     Alcohol/Drug Father     Allergies Father     Circulatory Father     Depression Father     Respiratory Father     Asthma Father     Alcohol/Drug Paternal Grandfather     Cerebrovascular Disease Paternal Grandfather     Depression Son     Psychotic Disorder Son         anxiety disorder    Depression Daughter     Cerebrovascular Disease Maternal Grandfather     Cerebrovascular Disease Paternal Grandmother     Diabetes Brother     Allergies Sister     Depression Sister     Gynecology Sister        Review of Systems   The 10 point Review of Systems is negative other than noted in the HPI or here.     Physical Exam   Temp: 98  F (36.7  C) Temp src: Oral BP: 121/70 Pulse: 74   Resp: 18 SpO2: 92 % O2 Device: None (Room air) Oxygen Delivery: 14 LPM  Vital Signs with Ranges  Temp:  [96  F (35.6  C)-98.4  F (36.9  C)] 98  F (36.7  C)  Pulse:  [74-92] 74  Resp:  [16-20] 18  BP: (108-135)/(58-73) 121/70  FiO2 (%):  [30 %-40 %] 30 %  SpO2:  [84 %-100 %] 92 %  262 lbs 0 oz    Constitutional: Normal  Eyes:  No conjunctival erythema  ENT: Neck is supple  Respiratory: CTA  Cardiovascular: RRR  Skin: Multiple psoriatic lesions in both upper and lower extremities.  Musculoskeletal: Pedal edema bilaterally  The patient is alert oriented x3 no acute distress.  Speech is fluent there is no aphasia apraxia or agnosia.  He knows the name of the president.  He can spell world forwards and backwards.  Memory was 4 out of 4 at 1 minute and 3 out of 4 at 5 minutes was 2 confabulations.  Pupils are equal round reactive to light extraocular movements are intact, there is no nystagmus.  Facial muscles are equal bilaterally.  Uvula and tongue are midline.  Muscular mass tone and strength are normal in all 4 extremities there is no pronator drift.  Reflexes are present symmetric in upper and lower extremities with 0 ankle jerks.  Babinski is absent.    Sensory exam shows decreased light touch in a stocking distribution in both lower extremities.  Vibratory sense was decreased at the ankles bilaterally.  Finger-nose-finger  without dysmetria.  Fine movements are normal.    Gait was deferred  Neuropsychiatric: Appropriate  There is no asterixis.    Data   Results for orders placed or performed during the hospital encounter of 12/24/23 (from the past 24 hour(s))   CBC with platelets differential    Narrative    The following orders were created for panel order CBC with platelets differential.  Procedure                               Abnormality         Status                     ---------                               -----------         ------                     CBC with platelets and d...[[id number]]  Abnormal            Final result                 Please view results for these tests on the individual orders.   Comprehensive metabolic panel   Result Value Ref Range    Sodium 134 (L) 135 - 145 mmol/L    Potassium 4.4 3.4 - 5.3 mmol/L    Carbon Dioxide (CO2) 33 (H) 22 - 29 mmol/L    Anion Gap 8 7 - 15 mmol/L    Urea Nitrogen 34.8 (H) 8.0  - 23.0 mg/dL    Creatinine 1.67 (H) 0.67 - 1.17 mg/dL    GFR Estimate 45 (L) >60 mL/min/1.73m2    Calcium 8.9 8.8 - 10.2 mg/dL    Chloride 93 (L) 98 - 107 mmol/L    Glucose 366 (H) 70 - 99 mg/dL    Alkaline Phosphatase 105 40 - 150 U/L    AST 30 0 - 45 U/L    ALT 38 0 - 70 U/L    Protein Total 6.3 (L) 6.4 - 8.3 g/dL    Albumin 3.9 3.5 - 5.2 g/dL    Bilirubin Total 0.6 <=1.2 mg/dL   Nt probnp inpatient (BNP)   Result Value Ref Range    N terminal Pro BNP Inpatient 155 0 - 900 pg/mL   Troponin T, High Sensitivity   Result Value Ref Range    Troponin T, High Sensitivity 37 (H) <=22 ng/L   Ethyl Alcohol Level   Result Value Ref Range    Alcohol ethyl <0.01 <=0.01 g/dL   CBC with platelets and differential   Result Value Ref Range    WBC Count 15.4 (H) 4.0 - 11.0 10e3/uL    RBC Count 3.94 (L) 4.40 - 5.90 10e6/uL    Hemoglobin 13.1 (L) 13.3 - 17.7 g/dL    Hematocrit 37.8 (L) 40.0 - 53.0 %    MCV 96 78 - 100 fL    MCH 33.2 (H) 26.5 - 33.0 pg    MCHC 34.7 31.5 - 36.5 g/dL    RDW 13.5 10.0 - 15.0 %    Platelet Count 153 150 - 450 10e3/uL    % Neutrophils 89 %    % Lymphocytes 5 %    % Monocytes 4 %    % Eosinophils 1 %    % Basophils 0 %    % Immature Granulocytes 1 %    NRBCs per 100 WBC 0 <1 /100    Absolute Neutrophils 13.7 (H) 1.6 - 8.3 10e3/uL    Absolute Lymphocytes 0.8 0.8 - 5.3 10e3/uL    Absolute Monocytes 0.7 0.0 - 1.3 10e3/uL    Absolute Eosinophils 0.1 0.0 - 0.7 10e3/uL    Absolute Basophils 0.1 0.0 - 0.2 10e3/uL    Absolute Immature Granulocytes 0.1 <=0.4 10e3/uL    Absolute NRBCs 0.0 10e3/uL   Blood gas venous   Result Value Ref Range    pH Venous 7.33 7.32 - 7.43    pCO2 Venous 64 (H) 40 - 50 mm Hg    pO2 Venous 32 25 - 47 mm Hg    Bicarbonate Venous 34 (H) 21 - 28 mmol/L    Base Excess/Deficit 6.1 (H) -7.7 - 1.9 mmol/L    FIO2 2    XR Chest 2 Views    Narrative    EXAM: XR CHEST 2 VIEWS  LOCATION: [facility]  DATE: 12/24/2023    INDICATION: hypoxia  COMPARISON: None.      Impression     IMPRESSION: Left lower lobe opacity, suspicious for infection. No pleural effusion or pneumothorax. Cardiac silhouette and mediastinal contours are normal.   CT Head w/o Contrast    Narrative    EXAM: CT HEAD W/O CONTRAST  LOCATION: [facility]  DATE/TIME: 12/24/2023 3:16 PM CST    INDICATION: Dizziness. Weakness.  COMPARISON: MRI brain dated 03/27/2023  TECHNIQUE: Routine CT Head without IV contrast. Multiplanar reformats. Dose reduction techniques were used.    FINDINGS:   INTRACRANIAL CONTENTS: No acute intracranial hemorrhage. No CT evidence of acute infarct. Sequelae of mild chronic microangiopathy. Mild cerebral volume loss without hydrocephalus. No extra-axial fluid collections.  Patent basal cisterns.     VISUALIZED ORBITS/SINUSES/MASTOIDS: The orbits are unremarkable. Mild to moderate paranasal sinus mucosal thickening. The temporal bone structures are well-aerated.     BONES/SOFT TISSUES: The calvarium and skull base are unremarkable.       Impression    IMPRESSION:     1. Senescent changes and sequelae of chronic microangiopathy without acute intracranial abnormality.   Symptomatic Influenza A/B, RSV, & SARS-CoV2 PCR (COVID-19) Nasopharyngeal    Specimen: Nasopharyngeal; Swab   Result Value Ref Range    Influenza A PCR Negative Negative    Influenza B PCR Negative Negative    RSV PCR Negative Negative    SARS CoV2 PCR Negative Negative    Narrative    Testing was performed using the Xpert Xpress CoV2/Flu/RSV Assay on the [employer] GeneXpert Instrument. This test should be ordered for the detection of SARS-CoV-2, influenza, and RSV viruses in individuals who meet clinical and/or epidemiological criteria. Test performance is unknown in asymptomatic patients. This test is for in vitro diagnostic use under the FDA EUA for laboratories certified under CLIA to perform high or moderate complexity testing. This test has not been FDA cleared or approved. A negative result does not rule  out the presence of PCR inhibitors in the specimen or target RNA in concentration below the limit of detection for the assay. If only one viral target is positive but coinfection with multiple targets is suspected, the sample should be re-tested with another FDA cleared, approved, or authorized test, if coinfection would change clinical management. This test was validated by the [facility] [employer]. These laboratories are certified under the Clinical Laboratory Improvement Amendments of 1988 (CLIA-88) as qualified to perform high complexity laboratory testing.   Glucose by meter   Result Value Ref Range    GLUCOSE BY METER POCT 289 (H) 70 - 99 mg/dL   Glucose by meter   Result Value Ref Range    GLUCOSE BY METER POCT 300 (H) 70 - 99 mg/dL   UA Macroscopic with reflex to Microscopic and Culture    Specimen: Urine, Clean Catch   Result Value Ref Range    Color Urine Yellow Colorless, Straw, Light Yellow, Yellow    Appearance Urine Slightly Cloudy (A) Clear    Glucose Urine >=1000 (A) Negative mg/dL    Bilirubin Urine Negative Negative    Ketones Urine Negative Negative mg/dL    Specific Gravity Urine 1.023 1.003 - 1.035    Blood Urine Negative Negative    pH Urine 5.0 5.0 - 7.0    Protein Albumin Urine 10 (A) Negative mg/dL    Urobilinogen Urine Normal Normal, 2.0 mg/dL    Nitrite Urine Negative Negative    Leukocyte Esterase Urine Negative Negative    Bacteria Urine Few (A) None Seen /HPF    Mucus Urine Present (A) None Seen /LPF    RBC Urine 1 <=2 /HPF    WBC Urine 3 <=5 /HPF    Squamous Epithelials Urine <1 <=1 /HPF    Hyaline Casts Urine 16 (H) <=2 /LPF    Narrative    Urine Culture not indicated   Blood gas arterial   Result Value Ref Range    pH Arterial 7.33 (L) 7.35 - 7.45    pCO2 Arterial 60 (H) 35 - 45 mm Hg    pO2 Arterial 105 80 - 105 mm Hg    FIO2 40     Bicarbonate Arterial 32 (H) 21 - 28 mmol/L    Base Excess/Deficit 4.2 (H) -9.0 - 1.8 mmol/L    Charles's Test Yes    Glucose by meter   Result  Value Ref Range    GLUCOSE BY METER POCT 290 (H) 70 - 99 mg/dL   CBC with platelets differential    Narrative    The following orders were created for panel order CBC with platelets differential.  Procedure                               Abnormality         Status                     ---------                               -----------         ------                     CBC with platelets and d...[[id number]]  Abnormal            Final result                 Please view results for these tests on the individual orders.   Blood gas venous with oxyhemoglobin   Result Value Ref Range    pH Venous 7.31 (L) 7.32 - 7.43    pCO2 Venous 65 (H) 40 - 50 mm Hg    pO2 Venous 31 25 - 47 mm Hg    Bicarbonate Venous 33 (H) 21 - 28 mmol/L    FIO2 0     Oxyhemoglobin Venous 55 (L) 70 - 75 %    Base Excess/Deficit 4.5 (H) -7.7 - 1.9 mmol/L   Comprehensive metabolic panel   Result Value Ref Range    Sodium 137 135 - 145 mmol/L    Potassium 4.0 3.4 - 5.3 mmol/L    Carbon Dioxide (CO2) 32 (H) 22 - 29 mmol/L    Anion Gap 7 7 - 15 mmol/L    Urea Nitrogen 30.6 (H) 8.0 - 23.0 mg/dL    Creatinine 1.07 0.67 - 1.17 mg/dL    GFR Estimate 76 >60 mL/min/1.73m2    Calcium 8.7 (L) 8.8 - 10.2 mg/dL    Chloride 98 98 - 107 mmol/L    Glucose 257 (H) 70 - 99 mg/dL    Alkaline Phosphatase 103 40 - 150 U/L    AST 24 0 - 45 U/L    ALT 35 0 - 70 U/L    Protein Total 6.1 (L) 6.4 - 8.3 g/dL    Albumin 3.8 3.5 - 5.2 g/dL    Bilirubin Total 0.4 <=1.2 mg/dL   Lactic Acid STAT   Result Value Ref Range    Lactic Acid 1.9 0.7 - 2.0 mmol/L   CBC with platelets and differential   Result Value Ref Range    WBC Count 7.2 4.0 - 11.0 10e3/uL    RBC Count 3.86 (L) 4.40 - 5.90 10e6/uL    Hemoglobin 12.7 (L) 13.3 - 17.7 g/dL    Hematocrit 37.7 (L) 40.0 - 53.0 %    MCV 98 78 - 100 fL    MCH 32.9 26.5 - 33.0 pg    MCHC 33.7 31.5 - 36.5 g/dL    RDW 13.6 10.0 - 15.0 %    Platelet Count 127 (L) 150 - 450 10e3/uL    % Neutrophils 84 %    % Lymphocytes 10 %    % Monocytes 4 %    %  Eosinophils 1 %    % Basophils 0 %    % Immature Granulocytes 1 %    NRBCs per 100 WBC 0 <1 /100    Absolute Neutrophils 6.0 1.6 - 8.3 10e3/uL    Absolute Lymphocytes 0.7 (L) 0.8 - 5.3 10e3/uL    Absolute Monocytes 0.3 0.0 - 1.3 10e3/uL    Absolute Eosinophils 0.1 0.0 - 0.7 10e3/uL    Absolute Basophils 0.0 0.0 - 0.2 10e3/uL    Absolute Immature Granulocytes 0.0 <=0.4 10e3/uL    Absolute NRBCs 0.0 10e3/uL   Glucose by meter   Result Value Ref Range    GLUCOSE BY METER POCT 238 (H) 70 - 99 mg/dL   Fractional excretion of sodium    Narrative    The following orders were created for panel order Fractional excretion of sodium.  Procedure                               Abnormality         Status                     ---------                               -----------         ------                     Fractional Excretion of ...[[id number]]                      In process                   Please view results for these tests on the individual orders.     *Note: Due to a large number of results and/or encounters for the requested time period, some results have not been displayed. A complete set of results can be found in Results Review.

## 2023-12-25 NOTE — PLAN OF CARE
Goal Outcome Evaluation:         Date/Time 12/24/23 @ 9533-4282    Trauma/Ortho/Medical (Choose one) Medical    Diagnosis: pneumonia of L lower lobe due to infectious organism  POD#:N/A  Mental Status: Alert and oriented x 4  Activity/dangle SBA with cane  Diet: Moderate consistent carb diet  Pain:   Parker/Voiding: Bathroom, urinal  Tele/Restraints/Iso: On tele- SR with PVC  02/LDA: Uses BIPAP @ bedtime, PIV infusing NaCl 0.9% @ 125 ml/hr  D/C Date: To be determined  Other Info: Still for fractional excretion of Na, CMP, CBC with platelets, blood gas venous, lactic acid. Baseline numbness in BLE, generalized peeling, flaky and redness in BLE and BUE. On K protocol.

## 2023-12-26 ENCOUNTER — TELEPHONE (OUTPATIENT)
Dept: FAMILY MEDICINE | Facility: CLINIC | Age: 68
End: 2023-12-26
Payer: COMMERCIAL

## 2023-12-26 ENCOUNTER — VIRTUAL VISIT (OUTPATIENT)
Dept: PHARMACY | Facility: CLINIC | Age: 68
End: 2023-12-26
Payer: COMMERCIAL

## 2023-12-26 ENCOUNTER — TRANSFERRED RECORDS (OUTPATIENT)
Dept: HEALTH INFORMATION MANAGEMENT | Facility: CLINIC | Age: 68
End: 2023-12-26
Payer: COMMERCIAL

## 2023-12-26 VITALS
DIASTOLIC BLOOD PRESSURE: 90 MMHG | TEMPERATURE: 98.2 F | RESPIRATION RATE: 16 BRPM | SYSTOLIC BLOOD PRESSURE: 140 MMHG | OXYGEN SATURATION: 94 % | HEART RATE: 65 BPM | HEIGHT: 66 IN | BODY MASS INDEX: 42.11 KG/M2 | WEIGHT: 262 LBS

## 2023-12-26 DIAGNOSIS — J18.9 PNEUMONIA OF LEFT UPPER LOBE DUE TO INFECTIOUS ORGANISM: Primary | ICD-10-CM

## 2023-12-26 DIAGNOSIS — E11.65 TYPE 2 DIABETES MELLITUS WITH HYPERGLYCEMIA, WITH LONG-TERM CURRENT USE OF INSULIN (H): ICD-10-CM

## 2023-12-26 DIAGNOSIS — L40.9 PSORIASIS: ICD-10-CM

## 2023-12-26 DIAGNOSIS — Z79.4 TYPE 2 DIABETES MELLITUS WITH HYPERGLYCEMIA, WITH LONG-TERM CURRENT USE OF INSULIN (H): ICD-10-CM

## 2023-12-26 DIAGNOSIS — F31.11 BIPOLAR 1 DISORDER, MANIC, MILD (H): ICD-10-CM

## 2023-12-26 DIAGNOSIS — G89.4 CHRONIC PAIN SYNDROME: ICD-10-CM

## 2023-12-26 LAB
GLUCOSE BLDC GLUCOMTR-MCNC: 146 MG/DL (ref 70–99)
GLUCOSE BLDC GLUCOMTR-MCNC: 188 MG/DL (ref 70–99)

## 2023-12-26 PROCEDURE — 99239 HOSP IP/OBS DSCHRG MGMT >30: CPT | Performed by: HOSPITALIST

## 2023-12-26 PROCEDURE — 250N000013 HC RX MED GY IP 250 OP 250 PS 637: Performed by: INTERNAL MEDICINE

## 2023-12-26 PROCEDURE — 999N000157 HC STATISTIC RCP TIME EA 10 MIN

## 2023-12-26 PROCEDURE — 94660 CPAP INITIATION&MGMT: CPT

## 2023-12-26 PROCEDURE — 99207 PR NO CHARGE LOS: CPT

## 2023-12-26 PROCEDURE — 250N000011 HC RX IP 250 OP 636: Mod: JZ | Performed by: INTERNAL MEDICINE

## 2023-12-26 RX ORDER — PREGABALIN 100 MG/1
100 CAPSULE ORAL AT BEDTIME
Start: 2023-12-26

## 2023-12-26 RX ORDER — SEMAGLUTIDE 1.34 MG/ML
0.5 INJECTION, SOLUTION SUBCUTANEOUS
COMMUNITY
End: 2024-03-21

## 2023-12-26 RX ORDER — CEFUROXIME AXETIL 500 MG/1
500 TABLET ORAL 2 TIMES DAILY
Qty: 10 TABLET | Refills: 0 | Status: SHIPPED | OUTPATIENT
Start: 2023-12-26 | End: 2024-03-21

## 2023-12-26 RX ORDER — AZITHROMYCIN 250 MG/1
250 TABLET, FILM COATED ORAL EVERY EVENING
Qty: 3 TABLET | Refills: 0 | Status: SHIPPED | OUTPATIENT
Start: 2023-12-26 | End: 2023-12-29

## 2023-12-26 RX ADMIN — DULOXETINE HYDROCHLORIDE 30 MG: 30 CAPSULE, DELAYED RELEASE ORAL at 09:37

## 2023-12-26 RX ADMIN — ENOXAPARIN SODIUM 40 MG: 40 INJECTION SUBCUTANEOUS at 06:50

## 2023-12-26 RX ADMIN — LOSARTAN POTASSIUM 100 MG: 100 TABLET, FILM COATED ORAL at 09:37

## 2023-12-26 RX ADMIN — Medication 600 MG: at 09:37

## 2023-12-26 RX ADMIN — OXYCODONE HYDROCHLORIDE 5 MG: 5 TABLET ORAL at 11:58

## 2023-12-26 RX ADMIN — METOPROLOL SUCCINATE 100 MG: 100 TABLET, EXTENDED RELEASE ORAL at 09:36

## 2023-12-26 RX ADMIN — TAMSULOSIN HYDROCHLORIDE 0.4 MG: 0.4 CAPSULE ORAL at 09:37

## 2023-12-26 RX ADMIN — OXYCODONE HYDROCHLORIDE AND ACETAMINOPHEN 1000 MG: 500 TABLET ORAL at 09:37

## 2023-12-26 RX ADMIN — LOPERAMIDE HYDROCHLORIDE 2 MG: 2 CAPSULE ORAL at 11:58

## 2023-12-26 RX ADMIN — ASPIRIN 81 MG: 81 TABLET, COATED ORAL at 09:37

## 2023-12-26 RX ADMIN — Medication 1 TABLET: at 09:37

## 2023-12-26 RX ADMIN — MODAFINIL 400 MG: 200 TABLET ORAL at 09:37

## 2023-12-26 RX ADMIN — OXYCODONE HYDROCHLORIDE 5 MG: 5 TABLET ORAL at 02:56

## 2023-12-26 RX ADMIN — ATORVASTATIN CALCIUM 30 MG: 20 TABLET, FILM COATED ORAL at 09:36

## 2023-12-26 RX ADMIN — CARIPRAZINE 4.5 MG: 4.5 CAPSULE, GELATIN COATED ORAL at 09:37

## 2023-12-26 RX ADMIN — INSULIN ASPART 1 UNITS: 100 INJECTION, SOLUTION INTRAVENOUS; SUBCUTANEOUS at 09:36

## 2023-12-26 RX ADMIN — BUPROPION HYDROCHLORIDE 150 MG: 150 TABLET, EXTENDED RELEASE ORAL at 09:37

## 2023-12-26 ASSESSMENT — ACTIVITIES OF DAILY LIVING (ADL)
ADLS_ACUITY_SCORE: 40

## 2023-12-26 NOTE — PATIENT INSTRUCTIONS
"Recommendations from today's MTM visit:                                                    Today we reviewed what your medicines are for, how to know if they are working, that your medicines are safe and how to make your medicine regimen as easy as possible.      You can take your antibiotics with food to help reduce the GI side effects   Continue Lyrica once nightly as directed after hospital discharge.  Sign and return forms mailed to you for patient assistance for  Duloxetine, Lantus, and Vraylar.  Look for your CPAP and let us know if there is anything you need  MTM has reached out to assistance coordinators to find out the status for Ozempic coverage.  Make an appointment to see Dr. Radha Mcdermott.     Follow-up: 1 week over [facility] about Ozempic coverage.    It was great speaking with you today.  I value your experience and would be very thankful for your time in providing feedback in our clinic survey. In the next few days, you may receive an email or text message from [employer] with a link to a survey related to your  clinical pharmacist.\"     To schedule another MTM appointment, please call the clinic directly or you may call the MTM scheduling line at [phone] or toll-free at [phone].     My Clinical Pharmacist's contact information:                                                      Please feel free to contact me with any questions or concerns you have.      Roxann Hdz PharmD  Medication Therapy Management Resident, [facility]  Pager: [phone]  Email: [email or web address]    Minda Smith, Pharm.D., M.B.A., [facility]  Medication Therapy Management Pharmacist, [facility]  Pager: [phone]  Email: [email or web address]     "

## 2023-12-26 NOTE — TELEPHONE ENCOUNTER
Cymbalta, Lantus, and Vraylar applications have been interofficed to Sherwin Garcia and mailed to Parmjit to review, sign, and send back to me.     We will note EPIC when ready to send to the .     Thank you!    Anne Marie Powell   Prescription Assistance Assistant

## 2023-12-26 NOTE — PLAN OF CARE
12/24/23, Pneumonia  12/25/23, 3 p to 7 p    Orientation: A&O, calm and pleasant    Vitals/Tele: VSS on RA, no pain, Bipap at night    IV Access/drains: IV infusing    Diet: Mod carb, tolerating    Mobility: A1 GB and Cane    GI/: Continent, ambulates to the BR    Wound/Skin: Scattered scabbing BUE, BLE, flare up from Psoriasis    Consults: Neurology following    Discharge Plan: Pending test,     Others:  Possible EEG      See Flow sheets for assessment

## 2023-12-26 NOTE — PLAN OF CARE
Goal Outcome Evaluation:  Summary: Left lower lobe community-acquired pneumonia  Acute hypoxic hypercapnic respiratory failure  Respiratory acidosis with metabolic alkalosis    Hx of amyloidosis, anxiety, depression, psoriasis, bipolar 1 disorder, hypertension, diabetes mellitus type 2, hyperlipidemia, MEJIAS with liver cirrhosis, restless leg syndrome, polyneuropathy, obstructive sleep apnea not using CPAP for the last 6 months, chronic pain with pain pump containing morphine and fentanyl     12/25/2023 [id number]     Orientation: A&Ox4, calm and pleasant     Vitals/Tele: VSS on RA, Bipap at night     IV Access/drains: IV infusing NS 75ml/hr     Diet: Mod carb, ,188     Mobility: A1 GB      GI/: Continent, ambulates to the BR. Pt c/o about loose stools that from IV antibiotics , which per pt reported he only had one since yesterday, requested iodium given x1 this shift. Education provided about constipation and side effect from narcotics pain meds, and pt verbalized understand.      Wound/Skin: Scattered scabbing BUE, BLE, flare up from Psoriasis from Plaque psoriasis     Consults: Neurology following     Discharge Plan: Pending test,      Others:  Possible EEG to exclude the possibility of seizures.  - denies SOB, Neuro intact, CMS intact except having flare up of psoriasis      See Flow sheets for assessment

## 2023-12-26 NOTE — DISCHARGE SUMMARY
Discharge Summary  Hospitalist    Date of Admission:  12/24/2023  Date of Discharge:  12/26/2023  Discharging Provider: Jessenia Maher MD, MD  Date of Service (when I saw the patient): 12/26/23    Discharge Diagnoses   Left lower lobe community-acquired pneumonia  Acute hypoxic hypercapnic respiratory failure, resolved  Respiratory acidosis with metabolic alkalosis, resolved    History of Present Illness   Please refer H & P for details.      Hospital Course   This is a 68-year-old male with history of amyloidosis, anxiety, depression, psoriasis, bipolar 1 disorder, hypertension, diabetes mellitus type 2, hyperlipidemia, MEJIAS with liver cirrhosis, restless leg syndrome, polyneuropathy, obstructive sleep apnea not using CPAP for the last 6 months, chronic pain with pain pump containing morphine and fentanyl who presented to the ER with generalized weakness, dizziness, lightheadedness, dropping things easily, sometimes difficulty with word findings, twitching and not feeling well.     Left lower lobe community-acquired pneumonia  Acute hypoxic hypercapnic respiratory failure, resolved  Respiratory acidosis with metabolic alkalosis, resolved  He has multiple comorbidities, presented with weakness and constellation of systemic symptoms, found to be hypoxic to 80s on room air, chest x-ray shows left lower lobe opacity suspicious for infection.  Although he has no other symptoms of pneumonia, his white cell count was elevated to 15.4.  -Opted to treat him for possible pneumonia, blood cultures obtained, started on IV ceftriaxone and azithromycin   -supplemental oxygen to keep saturation 90% above, incentive spirometry and flutter  -BiPAP at night. His hypoxic hypercapnic failure is multifactorial : he has not been using his CPAP for the last 6-months, recent increase in his morphine dosages in his morphine pump, renal failure with continued use of Lyrica and other sedating medications likely causing hypoxia and  hypercapnia.  -dose of Lyrica was lowered  - overall he is improving, twitching has improved, he is alert and awake, mentating well  -Patient noted that he will search for his home CPAP that got lost during a move as he notes that he feels better when he uses it.  -Stable O2 saturations.  Switch to p.o. cefuroxime and azithromycin to complete course at discharge.        Acute renal failure, prerenal, resolved  -Resolved with IVF, discontinued 12/26  -Avoid NSAIDs and nephrotoxic medication.  -Losartan initially held, resumed at discharge.     Generalized weakness  Clumsiness  Twitching/spasms/word finding difficulties  Hallucinations.  Acue metabolic/toxic encephalopathy, resolved  - likely multifactorial due to pain pump and underlying infection as well as dehydration  - improving with treatment of pneumonia and IV hydration and lowering the dose of Lyrica to 100 mg at night instead of 3 times a day  -CT scan of the head:  changes of chronic microangiopathy without acute intracranial abnormality.  - PT/OT- cleared for home.  - consult neurology: agreed with plan so far and also ordered EEG.  However on 12/26-patient notes that he is feeling feeling better and he wanted to discharge as he wants to keep  dermatology appointment later in the day.  Advised to follow up with outpatient Neurologist, will not be staying for EEG, low suspicion for seizures.  -Also advised him to follow-up with pain clinic to see if pain pump should be readjusted to a lower dose.  - Discussed the importance of compliance with CPAP machine        Diabetes mellitus type 2, last A1c 9.8 on 9/23  Uncontrolled diabetes at this time, he is on Ozempic, Lantus 40 units in the morning.  -continue with Lantus 40 in the morning   -Hold PTA Ozempic, resumed at discharge  -high-dose sliding scale insulin for correction and hypoglycemia protocol.     Obstructive sleep apnea, not compliant with CPAP  Restless leg syndrome  Polyneuropathy  Lost his CPAP  machine 6 months ago and not using it, has been hypoxic when waking up in the morning as per the patient, he has some hypercapnia on VBG's here as well.  -BiPAP at night, patient will try to search for his home CPAP and start using it after discharge.  Follow-up with PCP.  -continue Zyrtec at night  -cut down the dose of Lyrica  from 100 mg 3 times a day to 100 mg once a day  -continue with pain pump for now, will need to follow-up with pain clinic.  See above.     Hypertension  Hyperlipidemia  -He is on losartan 100 mg daily and metoprolol 100 mg daily and Lipitor.  -Hold losartan giving renal failure , resumed on 12/26  -continue metoprolol and Lipitor at this time.  -Blood pressure overall is in good control.     Bipolar disorder type I  Anxiety and depression  OCD  -continue PTA Wellbutrin, cariprazine     BPH  -continue PTA Flomax      Plaque psoriasis with acute flare  -He is followed by dermatologist as outpatient  -Immunosuppressant medication helps him but he is not on that now. He has requested the company to give him free and he is expecting that in the next few days  -His psoriasis is flaring up. Per patient nothing helps him except for that immunosuppressant medication  -On 12/26 he has appointment with his dermatologist in [place].  Therefore expedited discharge per patient request so that he can keep this appointment.    Constipation  -Bowel meds ordered         Jessenia Maher MD, MD      Pending Results   These results will be followed up by Hospitalist team.  Unresulted Labs Ordered in the Past 30 Days of this Admission       Date and Time Order Name Status Description    12/25/2023 12:14 PM Vitamin B1 whole blood In process     12/25/2023 12:14 PM Vitamin E In process     12/24/2023  3:17 PM Blood Culture Peripheral Blood Preliminary     12/24/2023  3:17 PM Blood Culture Peripheral Blood Preliminary             Code Status   Full Code       Primary Care Physician   Sherwin Garcia  Sharron    Follow-ups Needed After Discharge   Follow-up Appointments     Follow-up and recommended labs and tests       Follow up with primary care provider, Sherwin Mcdermott, within 7   days for hospital follow- up.  No follow up labs or test are needed.  Follow up with Dermatology ( keep appointment on 12/26)  Follow up with [facility] pain clinic in 1 - 2 weeks to see if pain pump needs   to be re-adjusted/ lowered  Follow up with primary Neurologist next month.            Physical Exam   Temp: 98.2  F (36.8  C) Temp src: Oral BP: (!) 140/90 Pulse: 65   Resp: 16 SpO2: 94 % O2 Device: None (Room air)    Vitals:    12/24/23 1308   Weight: 118.8 kg (262 lb)     Vital Signs with Ranges  Temp:  [98.1  F (36.7  C)-98.2  F (36.8  C)] 98.2  F (36.8  C)  Pulse:  [65-79] 65  Resp:  [16-18] 16  BP: (120-140)/(73-90) 140/90  SpO2:  [91 %-94 %] 94 %  No intake/output data recorded.    Constitutional: Alert, cooperative, no apparent distress, morbidly obese male sitting up in bed  Respiratory: Non labored breathing  Cardiovascula RRR, no edema  GI: Normal bowel sounds, soft, obese, nontender  Skin: Extensive plaque psoriasis all over his body  Neuro: Alert, engages in appropriate conversation, fluent speech, moving all extremities, no facial asymmetry  Psych: Calm and pleasant, no obvious anxiety/ depression      Discharge Disposition   Discharged to home  Condition at discharge: Stable    Consultations This Hospital Stay   PHYSICAL THERAPY ADULT IP CONSULT  OCCUPATIONAL THERAPY ADULT IP CONSULT  NEUROLOGY IP CONSULT  PAIN MANAGEMENT ADULT IP CONSULT    Time Spent on this Encounter   Jessenia SPRINGER MD, personally saw the patient today and spent greater than 30 minutes discharging this patient.    Discharge Orders      Reason for your hospital stay    Pneumonia, twitching, weakness, psoriasis with possible flare     Follow-up and recommended labs and tests     Follow up with primary care provider, Sherwin Mcdermott,  within 7 days for hospital follow- up.  No follow up labs or test are needed.  Follow up with Dermatology ( keep appointment on 12/26)  Follow up with [facility] pain clinic in 1 - 2 weeks to see if pain pump needs to be re-adjusted/ lowered  Follow up with primary Neurologist next month.     Activity    Your activity upon discharge: activity as tolerated     When to contact your care team    Call your primary doctor if you have any of the following: worsening pain, fever, skin rash, confusion, twitches, confusion, shortness of breath, cough.     Discharge Instructions    Please wear CPAP nightly     Diet    Follow this diet upon discharge: Orders Placed This Encounter      Moderate Consistent Carb (60 g CHO per Meal) Diet     Discharge Medications   Discharge Medication List as of 12/26/2023 12:24 PM        START taking these medications    Details   azithromycin (ZITHROMAX) 250 MG tablet Take 1 tablet (250 mg) by mouth every evening for 3 days, Disp-3 tablet, R-0, E-Prescribe      cefuroxime (CEFTIN) 500 MG tablet Take 1 tablet (500 mg) by mouth 2 times daily, Disp-10 tablet, R-0, E-Prescribe           CONTINUE these medications which have CHANGED    Details   pregabalin (LYRICA) 100 MG capsule Take 1 capsule (100 mg) by mouth at bedtime, No Print Out           CONTINUE these medications which have NOT CHANGED    Details   acetaminophen (TYLENOL) 500 MG tablet Take 1,000 mg by mouth every 8 hours as needed, Historical      aspirin (ASPIRIN LOW DOSE) 81 MG tablet Take 1 tablet (81 mg) by mouth daily, Disp-30 tablet,R-3, E-Prescribe      atorvastatin (LIPITOR) 20 MG tablet Take 1.5 tablets (30 mg) by mouth daily, Disp-135 tablet, R-3, E-Prescribe      Bioflavonoid Products (CANDIDO-C) TABS Take 1,000 mg by mouth daily, Disp-90 tablet, R-3, E-Prescribe      buPROPion (WELLBUTRIN XL) 150 MG 24 hr tablet Take 1 tablet (150 mg) by mouth every morning, Disp-90 tablet, R-3, E-Prescribe      calcium carbonate (OS-LUIS) 1500 (600  Ca) MG tablet Take 600 mg by mouth daily, Historical      cariprazine (VRAYLAR) 4.5 MG capsule Take 1 capsule (4.5 mg) by mouth daily, Disp-90 capsule, R-3, E-Prescribe      DULoxetine (CYMBALTA) 30 MG capsule Take 1 capsule (30 mg) by mouth 2 times daily, Disp-180 capsule, R-1, Local Print      Folic Acid-Vit B6-Vit B12 0.5-5-0.2 MG TABS Take 1 tablet by mouth daily, Disp-90 tablet, R-3, E-Prescribe      glucose 40 % (400 mg/mL) gel Take by mouth every 15 minutes as needed for low blood sugarNo Print Out      insulin glargine (LANTUS PEN) 100 UNIT/ML pen Inject 40 Units Subcutaneous at bedtime, HistoricalIf Lantus is not covered by insurance, may substitute Basaglar or Semglee or other insulin glargine product per insurance preference at same dose and frequency.        loperamide (IMODIUM) 2 MG capsule Take 1 capsule (2 mg) by mouth 4 times daily as needed for diarrhea, OTC      losartan (COZAAR) 100 MG tablet Take 1 tablet (100 mg) by mouth daily, Disp-90 tablet, R-3, E-Prescribe      medication given by implanted intrathecal pump continuous Drug # 1: Fentanyl (Sublimaze) - Conc: 2000 mcg/mL - Total Dose / 24 hours: 1663.3 mcg  Drug # 2: Bupivacaine (Marcaine)  - Conc: 20 mg/mL - Total Dose / 24 hours: 16.633 mg  Drug # 3: Morphine (Duramorph or Infumorph)  - Conc: 9.6 mg/mL - Tot al Dose / 24 hours: 7.9836 mg    Pump Reservoir Volume: 40 mL  Pump Reservoir Alarm Volume: 2 ml  Outside Clinic & Provider: [facility]  Last Refill Date: 12/21/23  Next Refill Date: 1/28/24  [place] Alarm Date:  Pump Manuf acturer: [employer]    Boluses: Up to 3 per day, 4 minute duration. Lock-out every 6 hours (also has dose restriction interval noted 1 per 8 hours)   Fentanyl: 110.1 mcg/dose  Bupivacaine: 1.101 mg/dose  Morphine: 0.5287 mg/dose, Historical      metFORMIN (GLUCOPHAGE XR) 500 MG 24 hr tablet Take 2 tablets (1,000 mg) by mouth 2 times daily (with meals), Disp-360 tablet, R-3,  E-Prescribe      metoprolol succinate ER (TOPROL XL) 100 MG 24 hr tablet Take 1 tablet (100 mg) by mouth daily, Disp-90 tablet, R-3, E-Prescribe      modafinil (PROVIGIL) 200 MG tablet Take 2 tablets (400 mg) by mouth daily Pt has increased his dose to 400mg daily from 300mg daily.  Has a message in to his physician for a dose increase., Disp-60 tablet, R-2, E-Prescribe      mometasone (ELOCON) 0.1 % external cream Apply topically daily as needed For psoriasis on faceHistorical      multivitamin w/minerals (THERA-VIT-M) tablet Take 1 tablet by mouth daily Men's 50+, Historical      nitroGLYcerin (NITROSTAT) 0.4 MG sublingual tablet Place 1 tablet (0.4 mg) under the tongue every 5 minutes as needed for chest pain For chest pain place 1 tablet under the tongue every 5 minutes for 3 doses. If symptoms persist 5 minutes after 1st dose call 911., Disp-30 tablet, R-1, E-Prescribe      senna-docusate (SENOKOT-S/PERICOLACE) 8.6-50 MG tablet Take 1-2 tablets by mouth 2 times daily as needed, Historical      SUMAtriptan (IMITREX) 50 MG tablet Take 1 tablet (50 mg) by mouth at onset of headache for migraine May repeat in 2 hours. Max 4 tablets/24 hours., Disp-8 tablet, R-1, E-Prescribe      tamsulosin (FLOMAX) 0.4 MG capsule Take 1 capsule (0.4 mg) by mouth 2 times daily, Disp-180 capsule, R-3, E-Prescribe      testosterone (ANDROGEL/TESTIM) 50 MG/5GM (1%) topical gel Place 1 packet (50 mg of testosterone) onto the skin daily, Disp-30 packet, R-1, E-Prescribe      vitamin B-Complex Take 3 tablets by mouth daily, Historical      vitamin D3 (CHOLECALCIFEROL) 50 mcg (2000 units) tablet Take 1 tablet (50 mcg) by mouth daily, Disp-90 tablet, R-3, E-Prescribe      albuterol (PROAIR HFA/PROVENTIL HFA/VENTOLIN HFA) 108 (90 Base) MCG/ACT inhaler Inhale 2 puffs into the lungs every 6 hours as needed for shortness of breath, wheezing or cough, Disp-18 g, R-0, E-PrescribePharmacy may dispense brand covered by insurance (Proair, or  proventil or ventolin or generic albuterol inhaler)      blood glucose (NO BRAND SPECIFIED) lancets standard Use to test blood sugar 1 time daily or as directed.Disp-100 Lancet, [id number]      blood glucose (NO BRAND SPECIFIED) test strip Use to test blood sugar 1 time daily or as directed., Disp-200 strip, R-1, E-Prescribe      Continuous Blood Gluc  (FREESTYLE VERA 2 READER) MARAL Use to read blood sugars as per 's instructions., Disp-1 each, R-0, E-Prescribe      Continuous Blood Gluc Sensor (FREESTYLE VERA 2 SENSOR) MISC Change every 14 days., Disp-2 each, R-5, E-Prescribe      insulin pen needle (31G X 8 MM) 31G X 8 MM miscellaneous Use one pen needles daily or as directed.Disp-100 each, [id number]      POTASSIUM PO Take 1 tablet by mouth daily Unspecified tablet strength; patient estimated 600 mg, takes for leg cramps, Historical           Allergies   Allergies   Allergen Reactions    Amoxicillin Diarrhea    Cephalexin Diarrhea    Liraglutide Other (See Comments), Headache and Unknown     Other reaction(s): Headache, Unknown  PN: migraines - Increased migraine frequency and severity      Sulfa Antibiotics Angioedema and Swelling     Pt has taken  Taken sulfa drugs orally without trouble. He had problems with Sulfa eye drops. Eye swelled up       Data   Most Recent 3 CBC's:  Recent Labs   Lab Test 12/25/23  0747 12/24/23  1442 10/04/23  1439   WBC 7.2 15.4* 6.6   HGB 12.7* 13.1* 14.0   MCV 98 96 92   * 153 178      Most Recent 3 BMP's:  Recent Labs   Lab Test 12/26/23  0756 12/26/23  0250 12/25/23  2320 12/25/23  0751 12/25/23  0747 12/24/23  1815 12/24/23  1442   NA  --   --  133*  --  137  --  134*   POTASSIUM  --   --  4.1  --  4.0  --  4.4   CHLORIDE  --   --  99  --  98  --  93*   CO2  --   --  26  --  32*  --  33*   BUN  --   --  20.6  --  30.6*  --  34.8*   CR  --   --  0.91  --  1.07  --  1.67*   ANIONGAP  --   --  8  --  7  --  8   LUIS  --   --  8.3*  --  8.7*  --   8.9   * 188* 211*   < > 257*   < > 366*    < > = values in this interval not displayed.     Most Recent 2 LFT's:  Recent Labs   Lab Test 12/25/23  0747 12/24/23  1442   AST 24 30   ALT 35 38   ALKPHOS 103 105   BILITOTAL 0.4 0.6     Most Recent INR's and Anticoagulation Dosing History:  Anticoagulation Dose History  More data may exist         Latest Ref Rng & Units 6/18/2015 6/23/2016 9/17/2016 2/1/2017 2/19/2017 1/27/2018 4/17/2020   Recent Dosing and Labs   INR 0.86 - 1.14 1.1     1.1     1.13  1.15  1.08  1.06  1.18       Details          This result is from an external source.             Most Recent 3 Troponin's:  Recent Labs   Lab Test 08/23/21  1232 12/06/20  1035 12/06/20  0607 12/06/20  0230 01/26/18  2331 01/26/18  1912   TROPI  --  0.077* 0.082* 0.087*   < >  --    TROPONIN <0.015  --   --   --   --  0.00    < > = values in this interval not displayed.     Most Recent Cholesterol Panel:  Recent Labs   Lab Test 02/23/23  1227   CHOL 153   LDL 79   HDL 35*   TRIG 197*     Most Recent 6 Bacteria Isolates From Any Culture (See EPIC Reports for Culture Details):  Recent Labs   Lab Test 11/30/20  1340 06/10/20  1129 01/03/19  0827 02/01/17  1904 02/01/17  1848   CULT No growth No growth after 4 weeks No beta hemolytic Streptococcus Group A isolated No growth No growth     Most Recent TSH, T4 and A1c Labs:  Recent Labs   Lab Test 09/18/23  1629   TSH 2.55   A1C 9.8*       Results for orders placed or performed during the hospital encounter of 12/24/23   CT Head w/o Contrast    Narrative    EXAM: CT HEAD W/O CONTRAST  LOCATION: [facility]  DATE/TIME: 12/24/2023 3:16 PM CST    INDICATION: Dizziness. Weakness.  COMPARISON: MRI brain dated 03/27/2023  TECHNIQUE: Routine CT Head without IV contrast. Multiplanar reformats. Dose reduction techniques were used.    FINDINGS:   INTRACRANIAL CONTENTS: No acute intracranial hemorrhage. No CT evidence of acute infarct. Sequelae of mild  chronic microangiopathy. Mild cerebral volume loss without hydrocephalus. No extra-axial fluid collections.  Patent basal cisterns.     VISUALIZED ORBITS/SINUSES/MASTOIDS: The orbits are unremarkable. Mild to moderate paranasal sinus mucosal thickening. The temporal bone structures are well-aerated.     BONES/SOFT TISSUES: The calvarium and skull base are unremarkable.       Impression    IMPRESSION:     1. Senescent changes and sequelae of chronic microangiopathy without acute intracranial abnormality.   XR Chest 2 Views    Narrative    EXAM: XR CHEST 2 VIEWS  LOCATION: [facility]  DATE: 12/24/2023    INDICATION: hypoxia  COMPARISON: None.      Impression    IMPRESSION: Left lower lobe opacity, suspicious for infection. No pleural effusion or pneumothorax. Cardiac silhouette and mediastinal contours are normal.     *Note: Due to a large number of results and/or encounters for the requested time period, some results have not been displayed. A complete set of results can be found in Results Review.

## 2023-12-26 NOTE — PROGRESS NOTES
Medication Therapy Management (MTM) Encounter    ASSESSMENT:                            Medication Adherence/Access: See below for considerations    Pneumonia:  Patient would benefit from taking the course of antibiotics as prescribed with food for better tolerability. Patient would benefit from using his CPAP. Plan in place to look for it at home and communicate if he needs a new one or new parts for the one he has.      Diabetes:   Plan in place to receive  assistance for Lantus and Ozempic. Awaiting patient to sign assistance documents and confirmation of Ozempic application. Future consideration for adding Novolog sliding scale for blood sugar control. Patient is due to have A1c and Urine albumin rechecked and ideally before seeing Dr. Radha Mcdermott.      Bipolar disorder:   Plan in place to get duloxetine and Vraylar through patient assistance.    Pain:  Patient would benefit from taking Lyrica once nightly as directed upon discharge. Plan in place to follow up with the pain clinic to discuss the doses of the pain medications used in his pump.      Psoriasis:   Plan in place to get Skyrizi from  assistance. Followed closely by dermatology.      PLAN:                            You can take your antibiotics with food to help reduce the GI side effects   Continue Lyrica once nightly as directed after hospital discharge.  Sign and return forms mailed to you for patient assistance for  Duloxetine, Lantus, and Vraylar.  Look for your CPAP and let us know if there is anything you need  MTM has reached out to assistance coordinators to find out the status for Ozempic coverage.  Make an appointment to see Dr. Radha Mcdermott.     Follow-up: 1 week over [facility] about Ozempic coverage.    SUBJECTIVE/OBJECTIVE:                          Erickson Hernández is a 68 year old male called for a follow-up visit from 11/02/23.       Reason for visit: Follow-Up, Hospital Discharge.    Allergies/ADRs: Reviewed in  chart  Past Medical History: Reviewed in chart  Tobacco: He reports that he has never smoked. He has never used smokeless tobacco.  Alcohol: No changes since last visit    Medication Adherence/Access: Patient will be receiving Duloxetine, Lantus, Vraylar, Skyrizi, and Ozempic from  patient assistance. Documents were mailed to him to sign and return for Duloxetine, Lantus, and Vraylar.     Hospital Stay - Pneumonia and KWABENA:   Presented to the ER with generalized weakness, dizziness, lightheadedness, dropping things easily, sometimes difficulty with word findings, twitching and not feeling well.  Found to have Left lower lobe community-acquired pneumonia, Acute hypoxic hypercapnic respiratory failure, Respiratory acidosis with metabolic alkalosis    Discharged medications:   Azithromycin 250 M tablet for 3 days  Cefuroxime 500 M tablet  2 times daily for 5 days   Reports history of GI upset and loose stools with previous antibiotic use.     Lyrica 100 mg reduced in the hospital to nightly due to kidney function and fatigue/cognitive ability.  Patient confirms these medications and changes.     The patient reports that while in hospital his doctor suspects the lack of CPAP use and his pain medications being delivered by his pump were contributing.     He says that he is feeling much better and more clear than he was a few days ago. He reports that the CPAP used in the hospital was helpful.      Diabetes   Trulicity 1.5 mg weekly. Last dose was 23.  Lantus 40 units- no reported difficulties prior to ED admission.   Metformin  mg two tablets twice daily with meals   Aspirin 81mg daily    Blood sugar monitoring:  Reports blood sugars have been over 200 and even in the low 300's. The glucose recorded on ED admission on 23 was 289 mg/dL     During his hospital stay he was given Novolog per sliding scale and his blood sugars temporarily improved to <180 mg/dL upon  discharge. He says that during his stay he wasn't able to snack in the middle of the night which he thinks also probably helped.     Eye exam is up to date  Foot exam is up to date  Urine Albumin:   Lab Results   Component Value Date    UMALCR 267.00 (H) 02/23/2023      Lab Results   Component Value Date    A1C 9.8 (H) 09/18/2023       Bipolar disorder:   Bupropion  mg daily   Duloxetine 30 mg twice daily   Vraylar 4.5mg daily - Is not taking right now because he has not been able to get from the  (cannot afford through insurance)    Does not report any issues taking the bupropion or duloxetine prior his hospital admission.      Pain:   Lyrica 100 mg- Reports taking one to two capsules daily prior to hospital admission  Cyclobenzaprine 10 mg three times daily   Pain pump with morphine, bupivicaine, fentanyl (managed by pain clinic):   Fentanyl (Sublimaze) - Conc: 2000 mcg/mL - Total Dose / 24 hours: 1663.3 mcg  Bupivacaine (Marcaine)  - Conc: 20 mg/mL - Total Dose / 24 hours: 16.633 mg  Morphine (Duramorph or Infumorph)  - Conc: 9.6 mg/mL - Tot al Dose / 24 hours: 7.9836 mg    Says that he needs to follow up with the pain clinic after his hospital stay. His hospitalist, Jessenia Maher MD,  suspects that his dose of morphine is too high and needs to be lowered and was likely a contributor (along with his infection) to his generalized weakness, dizziness, lightheadedness, and cognitive ability.       Psoriasis:   Previous use of Skyrizi - reports that he will be receiving via the medication from the  and will restart at that time.   Reports that the stinging/burning of his skin has been much more tolerable lately.  ----------------  Post Discharge Medication Reconciliation Status: discharge medications reconciled and changed, per note/orders.    I spent 22 minutes with this patient today. A copy of the visit note was provided to the patient's provider(s).    A summary of these  recommendations was sent via [employer].    Gabino ReichD  Medication Therapy Management Resident, [facility]  Pager: [phone]  Email: [email or web address]    Minda Smith, Pharm.D., M.B.A., [facility]  Medication Therapy Management Pharmacist, [facility]  Pager: [phone]  Email: [email or web address]      Telemedicine Visit Details  Type of service:  Telephone visit  Start Time:  3:45 PM  End Time: 4:07 PM     Medication Therapy Recommendations  No medication therapy recommendations to display

## 2023-12-26 NOTE — PLAN OF CARE
2/24/23, Pneumonia  12/26/23, 7 p to 3 p     Orientation: A&O, calm and pleasant     Vitals/Tele: VSS on RA, no pain, Bipap at night     IV Access/drains: IV infusing     Diet: Mod carb, tolerating     Mobility: Independent     GI/: Continent, ambulates to the BR     Wound/Skin: Scattered scabbing BUE, BLE, flare up from Psoriasis     Consults: Neurology following     Discharge Plan: Pending test,      Others:  Follow up with OP [facility] and Neurology    Discharge instruction done, questions encouraged  and answered. Patient acknowledged understanding.         See Flow sheets for assessment

## 2023-12-27 ENCOUNTER — TELEPHONE (OUTPATIENT)
Dept: PHARMACY | Facility: CLINIC | Age: 68
End: 2023-12-27

## 2023-12-27 ENCOUNTER — TELEPHONE (OUTPATIENT)
Dept: FAMILY MEDICINE | Facility: CLINIC | Age: 68
End: 2023-12-27

## 2023-12-27 DIAGNOSIS — E11.69 TYPE 2 DIABETES MELLITUS WITH OTHER SPECIFIED COMPLICATION, WITHOUT LONG-TERM CURRENT USE OF INSULIN (H): Primary | ICD-10-CM

## 2023-12-27 NOTE — TELEPHONE ENCOUNTER
Dose change:  Medication Change: Trulicity to Ozempic: Patient is a currently enrolled in PAP    Patient preferred phone number (please verify with pt): [phone]    Additional helpful info for PAP team:  I was told that no assistance could be offered for Trulicity but Ozempic was accepting new applications. I confirmed switching him to Ozempic with Dr. Radha Mcdermott and the patient agreed. A new Ozempic prescription was written on 12/08/23. Does an application still need to be submitted for this patient or is one in progress? He will need Ozempic soon. Thank you for any help and information you can provide.     Notes for MTM team:   Route TE to p RXASSIST    New patient referral  PAP team member will contact the patient via phone within 1-2 business days to schedule a medication consult.   PAP team will notify MTM via telephone encounter that a consult is scheduled.   PAP team will document next steps in an Epic telephone encounter, after the consult is completed.   Dose change/refill for current PAP patient  Enter new prescription in Epic (no print-out)  PAP team will document next steps in an Epic telephone encounter    Roxann Hdz RPH on 12/27/2023 at 2:39 PM

## 2023-12-27 NOTE — TELEPHONE ENCOUNTER
Minda,   Patient called.   Requesting to chat w/ you.   I believe it was a question about ozempic?  Patient didn't go into detail.   Thanks!  Ifeoma ALEX

## 2023-12-27 NOTE — TELEPHONE ENCOUNTER
Attempted several times to return the patient's call with no answer. Left message with my [facility] number and said that the patient could MyChart me as well.   Attempt 1: 1:57 pm   Attempt 2: 2:13 pm     I will send the patient a [employer] message as well.     Roxann Hdz [facility] on 12/27/2023 at 2:17 PM

## 2023-12-28 ENCOUNTER — PATIENT OUTREACH (OUTPATIENT)
Dept: CARE COORDINATION | Facility: CLINIC | Age: 68
End: 2023-12-28
Payer: COMMERCIAL

## 2023-12-28 LAB
A-TOCOPHEROL VIT E SERPL-MCNC: 9.1 MG/L
BETA+GAMMA TOCOPHEROL SERPL-MCNC: 2.7 MG/L
VIT B1 PYROPHOSHATE BLD-SCNC: 213 NMOL/L

## 2023-12-28 NOTE — TELEPHONE ENCOUNTER
Erickson Butler called back.   Patient was confused by your voicemail.   Please give him a call back when you can.   Thanks!  Ifeoma ALEX

## 2023-12-28 NOTE — PROGRESS NOTES
Connected Care Resource Center:   [facility] Resource [facility] Contact  [facility]/Voicemail     Clinical Data: Post-Discharge Outreach     Outreach attempted x 2.  Left message on patient's voicemail, providing [facility]'s central phone number of [phone] ([phone]) for questions/concerns and/or to schedule an appt with an [facility] provider, if they do not have a PCP.      Plan:  [facility] will do no further outreaches at this time.       Carrie Vincent RN  [facility] Care Resource [place], [facility]    *Connected Care Resource Team does NOT follow patient ongoing. Referrals are identified based on internal discharge reports and the outreach is to ensure patient has an understanding of their discharge instructions.

## 2023-12-28 NOTE — PROGRESS NOTES
[facility] Resource [facility]: [facility]: Post-Discharge Note  SITUATION                                                      Admission:    Admission Date: 12/24/23   Reason for Admission: Pneumonia of left upper lobe due to infectious organism  Discharge:   Discharge Date: 12/26/23  Discharge Diagnosis: Left lower lobe community-acquired pneumonia  Acute hypoxic hypercapnic respiratory failure, resolved  Respiratory acidosis with metabolic alkalosis, resolved    BACKGROUND                                                      Per hospital discharge summary and inpatient provider notes:    Parmjit Hernández is a 68 year old male history of chronic pain with pain pump, hypertension diabetes migraine headache light chain amyloidosis obstructive sleep apnea restless leg syndrome plaque psoriasis polyneuropathy amongst others presents with weakness.  He notes that he has chronic pain and got his pain pump refilled on Thursday and that they increased his morphine component.  He has noticed that he has been dozing off more and having hand spasms when he is awake.  Today he was driving to a friend's house when he got pulled over for erratic driving.  He noted to the officer that he had a Percocet last night and that he was not drinking alcohol and they gave him morning.  He notes that he feels that he drops things in his hands sporadically and has tremor.  He does take 1-1/2 Percocet a day for for breakthrough pain and also takes Flexeril twice a day for muscle spasms.  He notes he was sick 2 weeks ago with cold symptoms and diarrhea but that subsided.      ASSESSMENT           Discharge Assessment  How are you doing now that you are home?: Doing way better.  Symptoms are improving.  How are your symptoms? (Red Flag symptoms escalate to triage hotline per guidelines): Improved  Do you feel your condition is stable enough to be safe at home until your provider visit?: Yes  Does the patient have their discharge  instructions? : Yes  Does the patient have questions regarding their discharge instructions? : No  Were you started on any new medications or were there changes to any of your previous medications? : Yes  Does the patient have all of their medications?: Yes  Do you have questions regarding any of your medications? : Yes (see comment) (Patient has questions on the dosage change in the Lyrica. Patient will discuss more with prescribing provider.)  Discharge follow-up appointment scheduled within 14 calendar days? : No  Is patient agreeable to assistance with scheduling? : No         Post-op (Clinicians Only)  Did the patient have surgery or a procedure: No    Care Management   Community Health Worker Initial Outreach    CHW Initial Information Gathering:  Referral Source: IP Report  Preferred Hospital: [facility]  [phone]  Preferred Urgent Care: [facility], [phone]  Current living arrangement:: I live alone  Type of residence:: Apartment - handicap accessible  Community Resources: None  Supplies Currently Used at Home: None  Equipment Currently Used at Home: cane, straight, walker, rolling, grab bar, tub/shower  Informal Support system:: Children, Friends (States children don't really help out)  No PCP office visit in Past Year: No  Transportation means:: Regular car       Patient accepts CC: Yes. Patient scheduled for assessment with SONNY Najera on 1/10/2024 at 3pm. Patient noted desire to discuss Patient lost his CPAP machine during his move and states he is not able to get another one for a few years. Patient would like help in obtaining a new CPAP machine.  Patient would also like help getting access to housekeeping resources.         PLAN                                                      Outpatient Plan:   Follow up with primary care provider, Sherwin Mcdermott, within 7   days for hospital follow- up.  No follow up labs or test are  needed.  Follow up with Dermatology ( keep appointment on 12/26)  Follow up with [facility] pain clinic in 1 - 2 weeks to see if pain pump needs   to be re-adjusted/ lowered  Follow up with primary Neurologist next month.        Future Appointments   Date Time Provider Department Center   2/14/2024  9:00 AM [facility] [place]   2/14/2024 10:30 AM [facility] [place]   4/5/2024  4:20 PM [facility] [place]   4/12/2024 12:00 PM Charly Hester MD [facility]   4/23/2024 11:00 AM Marivel Walter, PARonaldoC [facility]         For any urgent concerns, please contact our 24 hour nurse triage line: [phone] ([phone])         Carrie Vincent RN

## 2023-12-30 LAB
BACTERIA BLD CULT: NO GROWTH
BACTERIA BLD CULT: NO GROWTH

## 2024-01-04 ENCOUNTER — OFFICE VISIT (OUTPATIENT)
Dept: FAMILY MEDICINE | Facility: CLINIC | Age: 69
End: 2024-01-04
Payer: COMMERCIAL

## 2024-01-04 VITALS
HEART RATE: 90 BPM | HEIGHT: 66 IN | BODY MASS INDEX: 40.92 KG/M2 | TEMPERATURE: 97.3 F | DIASTOLIC BLOOD PRESSURE: 94 MMHG | OXYGEN SATURATION: 95 % | WEIGHT: 254.6 LBS | RESPIRATION RATE: 16 BRPM | SYSTOLIC BLOOD PRESSURE: 170 MMHG

## 2024-01-04 DIAGNOSIS — L40.9 PSORIASIS: ICD-10-CM

## 2024-01-04 DIAGNOSIS — E78.5 HYPERLIPIDEMIA LDL GOAL <100: ICD-10-CM

## 2024-01-04 DIAGNOSIS — I10 HYPERTENSION GOAL BP (BLOOD PRESSURE) < 140/90: ICD-10-CM

## 2024-01-04 DIAGNOSIS — F31.76 DEPRESSED BIPOLAR I DISORDER IN FULL REMISSION (H): ICD-10-CM

## 2024-01-04 DIAGNOSIS — E11.69 TYPE 2 DIABETES MELLITUS WITH OTHER SPECIFIED COMPLICATION, WITHOUT LONG-TERM CURRENT USE OF INSULIN (H): ICD-10-CM

## 2024-01-04 DIAGNOSIS — E66.01 MORBID OBESITY (H): ICD-10-CM

## 2024-01-04 DIAGNOSIS — G89.4 CHRONIC PAIN SYNDROME: ICD-10-CM

## 2024-01-04 DIAGNOSIS — Z13.9 ENCOUNTER FOR SCREENING INVOLVING SOCIAL DETERMINANTS OF HEALTH (SDOH): ICD-10-CM

## 2024-01-04 DIAGNOSIS — J18.9 PNEUMONIA OF LEFT LOWER LOBE DUE TO INFECTIOUS ORGANISM: ICD-10-CM

## 2024-01-04 DIAGNOSIS — Z09 HOSPITAL DISCHARGE FOLLOW-UP: Primary | ICD-10-CM

## 2024-01-04 DIAGNOSIS — G62.9 PERIPHERAL POLYNEUROPATHY: ICD-10-CM

## 2024-01-04 PROCEDURE — 99495 TRANSJ CARE MGMT MOD F2F 14D: CPT | Performed by: FAMILY MEDICINE

## 2024-01-04 RX ORDER — RISANKIZUMAB-RZAA 150 MG/ML
150 INJECTION SUBCUTANEOUS ONCE
COMMUNITY
Start: 2023-11-06

## 2024-01-04 ASSESSMENT — PAIN SCALES - GENERAL: PAINLEVEL: MODERATE PAIN (4)

## 2024-01-04 NOTE — PROGRESS NOTES
Assessment & Plan     Hospital discharge follow-up  He appears to be feeling better since being discharged from the hospital.    Pneumonia of left lower lobe due to infectious organism  He is feeling much better since being discharged from the hospital.  He completed the oral antibiotics and is not having any breathing/coughing issues at this time.    Type 2 diabetes mellitus with other specified complication, without long-term current use of insulin (H)  He was able to  the Ozempic and reports that his blood glucose numbers have come down significantly.  He states that his most recent morning glucose reading was 128.  He is due to have his hemoglobin A1c rechecked.  He did not have time to do this today, but is going to schedule a lab visit when he is fasting soon.  - Hemoglobin A1c; Future  - Albumin Random Urine Quantitative with Creat Ratio; Future    Hypertension goal BP (blood pressure) < 140/90  His blood pressure is running high today because he did not take his medications.  He has losartan 100 mg daily and metoprolol  mg daily that he is planning to take right away when he gets home.    Depressed bipolar I disorder in full remission (H24)  He feels like mental health symptoms are stable on his current medications.    Psoriasis  He will continue following closely with his dermatologist.  They are in the process of trying to get Skyrizi approved.    Morbid obesity (H)  He will continue to work on lifestyle modifications to help with weight loss.  He is on Ozempic now which should help with this as well.    Peripheral polyneuropathy  He recently had the Lyrica dose decreased during his last hospitalization since it was felt to be a contributing factor to his respiratory depression issues.    Chronic pain syndrome  He reports decreasing the morphine component of his pain pump medication.  He seems more alert during his appointment today than he has at previous appointments.    Hyperlipidemia LDL  goal <100  Currently on atorvastatin.  He is due to have his cholesterol rechecked.  He is planning to schedule a fasting lab appointment in the near future.  - Comprehensive metabolic panel (BMP + Alb, Alk Phos, ALT, AST, Total. Bili, TP); Future  - Lipid Profile (Chol, Trig, HDL, LDL calc); Future    Encounter for screening involving social determinants of health (SDoH)  He has been working with our team to help him afford his medications.           MED REC REQUIRED  Post Medication Reconciliation Status: discharge medications reconciled, continue medications without change      Sherwin Mcdermott, DO  [facility] is a 68 year old, presenting for the following health issues:  Hospital F/U and Pain        1/4/2024    11:53 AM   Additional Questions   Roomed by Ritu DAMON   Accompanied by n/a       HPI         12/28/2023    10:31 AM   Post Discharge Outreach   Admission Date 12/24/2023   Reason for Admission Pneumonia of left upper lobe due to infectious organism   Discharge Date 12/26/2023   Discharge Diagnosis Left lower lobe community-acquired pneumonia  Acute hypoxic hypercapnic respiratory failure, resolved  Respiratory acidosis with metabolic alkalosis, resolved   How are you doing now that you are home? Doing way better.  Symptoms are improving.   How are your symptoms? (Red Flag symptoms escalate to triage hotline per guidelines) Improved   Do you feel your condition is stable enough to be safe at home until your provider visit? Yes   Does the patient have their discharge instructions?  Yes   Does the patient have questions regarding their discharge instructions?  No   Were you started on any new medications or were there changes to any of your previous medications?  Yes   Does the patient have all of their medications? Yes   Do you have questions regarding any of your medications?  Yes (see comment)   Discharge follow-up appointment scheduled within 14 calendar  days?  No     Hospital Follow-up Visit:    Hospital/Nursing Home/IP Rehab Facility: [facility]  Date of Admission: 12/24/2023  Date of Discharge: 12/26/2023  Reason(s) for Admission: Pneumonia of left upper lobe due to infectious organism    Was your hospitalization related to COVID-19? No   Problems taking medications regularly:  None  Medication changes since discharge: Yes  Problems adhering to non-medication therapy:  None    He presents to the clinic for hospital discharge follow-up visit.  He has a complex medical history that includes amyloidosis, anxiety, depression, psoriasis, bipolar 1 disorder, hypertension, diabetes mellitus type 2, hyperlipidemia, MEJIAS with liver cirrhosis, plaque psoriasis, restless leg syndrome, polyneuropathy, obstructive sleep apnea not using CPAP for the last 6 months, chronic pain with pain pump containing morphine and fentanyl.  He presented to the ER with generalized weakness, dizziness, lightheadedness, dropping things easily, sometimes difficulty with word findings, twitching and not feeling well.  He was hypoxic in the 80s on room air when he presented.  His chest x-ray showed a left lower lobe opacity suspicious for infection.  His white blood cell count was elevated at 15.4.  He was admitted and treated with IV ceftriaxone and azithromycin.  He was on supplemental oxygen and BiPAP at night.  It was felt that the increased morphine dosages and other sedating medications were likely contributing to the hypoxia and hypercapnia issues.  Symptoms improved and he was switched to oral cefuroxime and azithromycin at discharge.    He reports feeling much better since being discharged from the hospital.  He is not short of breath.  So far, he is tolerating the lower dose of Lyrica without significant peripheral neuropathy problems.  He has also lowered the morphine dose as well which he appears to be tolerating well.  He did not take his blood pressure  "medications this morning though.    Summary of hospitalization:  [facility] discharge summary reviewed  Diagnostic Tests/Treatments reviewed.  Follow up needed: none  Other Healthcare Providers Involved in Patient s Care:         None  Update since discharge: improved.         Plan of care communicated with patient                 Review of Systems   Constitutional, HEENT, cardiovascular, pulmonary, GI, , musculoskeletal, neuro, skin, endocrine and psych systems are negative, except as otherwise noted.      Objective    BP (!) 170/94   Pulse 90   Temp 97.3  F (36.3  C) (Temporal)   Resp 16   Ht 1.676 m (5' 6\")   Wt 115.5 kg (254 lb 9.6 oz)   SpO2 95%   BMI 41.09 kg/m    Body mass index is 41.09 kg/m .  Physical Exam   GENERAL: healthy, alert and no distress  EYES: Eyes grossly normal to inspection, PERRL and conjunctivae and sclerae normal  HENT:  nose and mouth without ulcers or lesions  NECK: no adenopathy, no asymmetry, masses, or scars and thyroid normal to palpation  RESP: lungs clear to auscultation - no rales, rhonchi or wheezes  CV: regular rate and rhythm, normal S1 S2, no S3 or S4, no murmur, click or rub, no peripheral edema and peripheral pulses strong  MS: no gross musculoskeletal defects noted, no edema  SKIN: There are several psoriasis plaques scattered on his body.  NEURO: Normal strength and tone, mentation intact and speech normal  PSYCH: mentation appears normal, affect normal/bright                      "

## 2024-01-04 NOTE — COMMUNITY RESOURCES LIST (ENGLISH)
01/04/2024   [facility]  N/A  For questions about this resource list or additional care needs, please contact your primary care clinic or care manager.  Phone: [phone]   Email: N/A   Address: [address] [place] [address]   Hours: N/A        Financial Stability       Rent and mortgage payment assistance  1  Community [facility] Distance: 2.66 miles      In-Person   [address] [place] [address]  Language: English  Hours: Mon - Fri 8:00 AM - 4:00 PM  Fees: Free   Phone: (548) 738-5327 Email: info@[facility].[employer] Website: https://[employer]/     2  [facility] Distance: 3.62 miles      Phone/Virtual   [address] [place] [address]  Language: English  Hours: Mon - Fri 8:00 AM - 4:30 PM  Fees: Free   Phone: (389) 653-3821 Email: trang@[facility] Website: http://www.[facility]/departments/[facility]-/committees-commissions-/btygtfa-ityyzmgeawffe-ncsmxkuvm          Food and Nutrition       Food pantry  3  [facility] Food Shel[facility] Distance: 3.13 miles      Pickup   [address] [place] [address]  Language: English  Hours: Thu 2:00 PM - 6:00 PM , Thu 3:00 PM - 6:00 PM  Fees: Free   Phone: (164) 267-1591 Email: foodshelf@[facility].org Website: https://[facility].org/serve/food-shelf/     4  [facility] - Food pantry Distance: 4.55 miles      Pickup   [address] [place] [address]  Language: English  Hours: Fri 9:30 AM - 2:00 PM  Fees: Free   Phone: (353) 851-3887 Email: contact@[employer].org Website: https://[employer].org/     SNAP application assistance  5  [facility] Distance: 2.98 miles      Phone/Virtual   [address] [place] [address]  Language: English, Urdu  Hours: Mon 9:00 AM - 1:00 PM , Tue - Thu 9:00 AM - 4:00  PM , Fri 9:00 AM - 1:00 PM  Fees: Free   Phone: (479) 840-6716 Email: info@[employer] Website: http://www.[employer]org/     6  [facility] Distance: 4.91 miles      In-Person, Phone/Virtual   [address] [place] [address]  Language: American Sign Language, English  Hours: Mon - Fri 8:00 AM - 4:00 PM  Fees: Free   Phone: (409) 426-6690 Email: servicecenterinfo@[facility] Website: https://www.[employer]/residents#human-services     Soup kitchen or free meals  7  [facility] - Community Meal Distance: 2.55 miles      [facility]   [address] [place] [address]  Language: English  Hours: Mon 5:30 PM - 6:30 PM  Fees: Free   Phone: (307) 473-7192 Email: contact@[employer] Website: https://www.[employer]/     8  [facility] Distance: 3.02 miles      Pickup   [address] [place] [address]  Language: English, Venezuelan  Hours: Sat 5:30 PM - 6:30 PM  Fees: Free   Phone: (144) 754-7889 Email: info@[employer] Website: https://www.[facility].org/          Important Numbers & Websites       Emergency Services   911  [facility]   311  Poison Control   (532) 490-3070  Suicide Prevention Lifeline   (729) 525-3128 (TALK)  Child Abuse Hotline   (540) 332-5756 (4-A-Child)  Sexual Assault Hotline   (301) 505-2302 (HOPE)  National Runaway Safeline   (531) 209-4950 (RUNAWAY)  All-Options Talkline   (541) 734-7528  Substance Abuse Referral   (361) 263-5156 (HELP)

## 2024-01-10 ENCOUNTER — PATIENT OUTREACH (OUTPATIENT)
Dept: CARE COORDINATION | Facility: CLINIC | Age: 69
End: 2024-01-10

## 2024-01-10 ASSESSMENT — ACTIVITIES OF DAILY LIVING (ADL): DEPENDENT_IADLS:: CLEANING

## 2024-01-10 NOTE — LETTER
[facility] CARE COORDINATION  [address]  [facility] [place] [address]   January 10, 2024    Parmjit Hernández  [address]  [place] [facility] [address]      Dear Parmjit,    I am a clinic care coordinator who works with Sherwin Mcdermott,  with the [facility]. I wanted to thank you for spending the time to talk with me.  Below is a description of clinic care coordination and how I can further assist you.       The clinic care coordination team is made up of a registered nurse, , financial resource worker and community health worker who understand the health care system. The goal of clinic care coordination is to help you manage your health and improve access to the health care system. Our team works alongside your provider to assist you in determining your health and social needs. We can help you obtain health care and community resources, providing you with necessary information and education. We can work with you through any barriers and develop a care plan that helps coordinate and strengthen the communication between you and your care team.  Our services are voluntary and are offered without charge to you personally.    Please feel free to contact me with any questions or concerns regarding care coordination and what we can offer.      We are focused on providing you with the highest-quality healthcare experience possible.    Sincerely,     Chantel Montgomery, [facility] Ambulatory Care Management  [facility]  Phone: [phone]  E-mail: [email or web address]     Enclosed: I have enclosed a copy of the Patient Centered Plan of Care. This has helpful information and goals that we have talked about. Please keep this in an easy to access place to use as needed.

## 2024-01-10 NOTE — PROGRESS NOTES
Clinic Care Coordination Contact  Clinic Care Coordination Contact  OUTREACH    Referral Information:  Referral Source: IP Report    Primary Diagnosis: Psychosocial    Chief Complaint   Patient presents with    Clinic Care Coordination - Initial        Universal Utilization:   Clinic Utilization  No PCP office visit in Past Year: No  Utilization      No Show Count (past year)  9             ED Visits  7             Hospital Admissions  6                    Current as of: 1/31/2024  7:23 AM                Clinical Concerns:  Current Medical Concerns:    Patient Active Problem List   Diagnosis    Low back pain    Hyperlipidemia    Hypertension goal BP (blood pressure) < 140/90    Type 2 diabetes mellitus (H)    Advanced directives, counseling/discussion    Migraine headache    History of diverticulitis    MENTAL HEALTH    Generalized abdominal pain    Light chain (AL) amyloidosis (H)    Insomnia due to medical condition    Obstructive sleep apnea syndrome    Restless legs syndrome (RLS)    Leg edema    Morbid obesity (H)    Venous stasis dermatitis of both lower extremities    Polyneuropathy, unspecified    Acquired lymphedema    Low blood pressure reading    History of peripheral stem cell transplant (H)    Other specified anxiety disorders    Abnormal electrocardiography    Bilateral leg edema    Onychomycosis    Thrombocytopenia, unspecified (H24)    Bilateral leg weakness    NSTEMI (non-ST elevated myocardial infarction) (H)    Essential hypertension    Peripheral vascular disease (H24)    Other cirrhosis of liver (H)    Lymphedema    Cellulitis of right lower extremity    Right upper quadrant pain    Abnormal computed tomography scan    Acquired pancytopenia (H)    Benign neoplasm of cecum    Benign neoplasm of transverse colon    Benign prostatic hyperplasia    Bilateral cataracts    Bipolar 1 disorder, manic, mild (H)    Cannabis dependence in remission (H)    Cardiovascular stress test abnormal    Chronic  fatigue, unspecified    Continuous opioid dependence (H)    Contusion of rib    Coronary arteriosclerosis    DDD (degenerative disc disease), lumbar    Decreased testosterone level    Depressed bipolar I disorder in full remission (H24)    Chronic diarrhea    Hernia of anterior abdominal wall    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)    Guillain-Occoquan syndrome (H24)    History of anaphylaxis due to severe acute respiratory syndrome coronavirus 2 (SARS-CoV-2) vaccine    History of colonic polyps    Hypertensive renal disease    Hypocalcemia    Immunoglobulin deficiency (H24)    Localized enlarged lymph nodes    Nonalcoholic steatohepatitis    Noninfectious gastroenteritis    Other muscle spasm    Polyp of colon    Presence of implanted infusion pump    Retention of urine    Spinal stenosis at L4-L5 level    Chronic pain    Cerebral infarction, unspecified (H)    Abdominal pain, generalized    Nausea and vomiting, unspecified vomiting type    Cellulitis of lower extremity, unspecified laterality    Pneumonia of left upper lobe due to infectious organism        Current Behavioral Concerns: none discussed today    Education Provided to patient: community resources, dme contact      Health Maintenance Reviewed:    Clinical Pathway:     Medication Management:  Medication review status:   Not reviewed     Functional Status:  Dependent ADLs:: Independent  Dependent IADLs:: Cleaning  Bed or wheelchair confined:: No  Mobility Status: Independent w/Device    Living Situation:  Current living arrangement:: I live alone  Type of residence:: Apartment - handicap accessible    Lifestyle & Psychosocial Needs:    Social Determinants of Health     Food Insecurity: High Risk (1/4/2024)    Food Insecurity     Within the past 12 months, did you worry that your food would run out before you got money to buy more?: Yes     Within the past 12 months, did the food you bought just not last and you didn t have money to  get more?: No   Depression: Not at risk (12/12/2023)    PHQ-2     PHQ-2 Score: 2   Housing Stability: High Risk (1/4/2024)    Housing Stability     Do you have housing? : Yes     Are you worried about losing your housing?: Yes   Tobacco Use: Low Risk  (1/4/2024)    Patient History     Smoking Tobacco Use: Never     Smokeless Tobacco Use: Never     Passive Exposure: Not on file   Financial Resource Strain: Low Risk  (1/4/2024)    Financial Resource Strain     Within the past 12 months, have you or your family members you live with been unable to get utilities (heat, electricity) when it was really needed?: No   Alcohol Use: Not on file   Transportation Needs: Low Risk  (1/4/2024)    Transportation Needs     Within the past 12 months, has lack of transportation kept you from medical appointments, getting your medicines, non-medical meetings or appointments, work, or from getting things that you need?: No   Physical Activity: Unknown (5/13/2020)    Exercise Vital Sign     Days of Exercise per Week: 1 day     Minutes of Exercise per Session: Not on file   Interpersonal Safety: Low Risk  (10/11/2023)    Interpersonal Safety     Do you feel physically and emotionally safe where you currently live?: Yes     Within the past 12 months, have you been hit, slapped, kicked or otherwise physically hurt by someone?: No     Within the past 12 months, have you been humiliated or emotionally abused in other ways by your partner or ex-partner?: No   Stress: Not on file   Social Connections: Not on file        Transportation means:: Regular car     Mormonism or spiritual beliefs that impact treatment:: No  Mental health DX:: Yes  Mental health DX how managed:: Medication  Mental health management concern (GOAL):: No  Informal Support system:: Children, Friends (States children don't really help out)        [facility] spoke with patient to enroll in care coordination. He lives alone and is having trouble with his CPAP. He shares that he has  "had one for 20 years, went through many machines. [facility] recalled thousands due to the sound insulation breaking down and could be inhaled leading to long term lung issues. He was told to stop using it, to return it. He did that, but they never provided a replacement, they were backordered and offered no alternative. They finally did and he did another sleep study in [place] and got a new machine. They replaced the Erik one with a different brand. He started using it but it was lost in a move. He couldn't remember the name of the company that provided the one he lost. Writer pulled up a list of commonly used CPAP brands and he was able to recognize the  as [facility]. Provided him with the number for [employer] [facility] and he plans to call to ask for a replacement.     He also asked for a resources for help with chores at home, specifically vacuuming. Sent him information on Help at Your Door.     When asked about basic needs he states \"I am keeping up. I do go to a food shelf occasionally. I don't qualify for food stamps. My social security is about double the poverty level. I do struggle with keeping up with my medications.\" Has been referred to [facility] program to address this.     Resources and Interventions:  Current Resources:      Community Resources: None  Supplies Currently Used at Home: None  Equipment Currently Used at Home: cane, straight, walker, rolling, grab bar, tub/shower  Employment Status: employed part-time              Referrals Placed: Community Resources       Care Plan:  Care Plan: Community Resources       Problem: Lack of transportation               Problem: Unable to prepare meals               Problem: Reliable food source               Problem: Insufficient In-home support       Goal: Establish adequate home support       Start Date: 1/10/2024 Expected End Date: 7/10/2024    Note:     Barriers: Mobility Issues  Strengths: Connected with Care Coordination  Patient " expressed understanding of goal: Yes  Action steps to achieve this goal:  1. I will review housekeeping resource sent by [facility]  2. I will connect with this resource if I feel it will be helpful  3. I will remain in communication with Care Coordination about barriers I encounter or additional resources needed                             Care Plan: General       Problem: HP GENERAL PROBLEM       Goal: Obtain Replacement CPAP       Start Date: 1/10/2024 Expected End Date: 7/10/2024    Note:     Barriers: Misplaced CPAP  Strengths: Connected with Care Coordination  Patient expressed understanding of goal: yes  Action steps to achieve this goal:  1. I will contact [facility] customer service to request a replacement machine  2. I will remain in communication with Care Coordination about additional resources needed or barriers I encounter                                Patient/Caregiver understanding: yes    Outreach Frequency: monthly, more frequently as needed  Future Appointments                In 2 weeks PFT LAB [facility]    In 2 weeks Dhruv Corea MD [facility]    In 2 months [facility]    In 2 months Charly Hester MD [facility] Blood and Marrow Transplant Program [facility]    In 2 months Marivel Walter PA-C [facility] Dermatology Clinic [facility]            Plan: Pt will review resources provided and outreach to those resources as desired. CC CHW will outreach in 30 days.     ROGERIO Stephenson  [facility] Ambulatory Care Management  [facility]  Phone: [phone]  E-mail: [email or web address]

## 2024-01-10 NOTE — LETTER
[facility]  Patient Centered Plan of Care  About Me:        Patient Name:  Parmjit Prescott    YOB: 1955  Age:         68 year old   Jack MRN:    [id number] Telephone Information:  Home Phone [phone]   Mobile [phone]       Address:  [address]  [facility] [address] Email address:  [email or web address]      Emergency Contact(s)    Name Relationship Lgl Grd Work Phone Home Phone Mobile Phone   1. JC PRESCOTT Daughter   [phone]   2. POOR,DON Friend   [phone]           Primary language:  English     needed? No   [facility]:  [phone] op. 1  Other communication barriers:No data recorded  Preferred Method of Communication:  Emiliana  Current living arrangement: I live alone    Mobility Status/ Medical Equipment: Independent w/Device        Health Maintenance  Health Maintenance Reviewed: No data recorded    My Access Plan  Medical Emergency 911   Primary Clinic Line [facility] [phone]   24 Hour Appointment Line [phone] or  [phone]) (toll-free)   24 Hour Nurse Line [phone] (toll-free)   Preferred Urgent Care [facility], [phone]     Preferred Hospital [facility]  [phone]     Preferred Pharmacy [facility] [address]     Behavioral Health Crisis Line The National Suicide Prevention Lifeline at 1-900.381.2464 or Text/Call 558           My Care Team Members  Patient Care Team         Relationship Specialty Notifications Start End    Sherwin Mejia DO PCP - General Family Medicine  3/1/23     zreferred ot Derm    Phone: [phone] Fax: [phone] [address] [address]    Kuldeep Borrero MD MD Gastroenterology  1/11/16     Phone: [phone] Fax: [phone]         [address] [place]  [place] [address] [facility] [address]    Sherwin Mejia DO Assigned PCP   3/11/23     Phone: [phone] Fax: [phone]         [address] [facility] [address] [facility] [address]    Lisseth Arana DPM Assigned Surgical Provider   3/11/23     Phone: [phone] Fax: [phone] [address] [facility] [address]    Dylan Jose MD MD Neurology  3/15/23     Phone: [phone] Fax: [phone]         [address] [facility] [address]    Kera Jernigan APRN CNP Nurse Practitioner Cardiovascular Disease  3/16/23     Phone: [phone] Fax: [phone] [address] [facility] [address]    Charly Hester MD Assigned Cancer Care Provider   3/25/23     Phone: [phone] Fax: [phone]         [address] [place] [address] [address] [facility] [address]    Diana Bobo, RN Specialty Care Coordinator Hematology & Oncology Admissions 3/27/23     Rodrick Ferrer MD Assigned Heart and Vascular Provider   4/8/23     Phone: [phone] Fax: [phone] [address] [facility] [address] [facility] [address]    Dylan Jose MD Assigned Neuroscience Provider   9/23/23     Phone: [phone] Fax: [phone]         [address] [place] [address]    Roxann Hdz [facility] Pharmacist Pharmacist  11/2/23     Dhruv Corea MD MD Pulmonary Disease  11/6/23     Phone: [phone] Fax: [phone]         [address] [place] [address]    Minda Smith [facility] Assigned MTM Pharmacist   11/11/23     Phone: [phone] Fax: [phone] [address], [address] [address]    Minda Smith [facility] Pharmacist Pharmacist  11/17/23     Phone: [phone] Fax: [phone] [address], [facility] [address] [facility] [address]    Marivel Walter PA-C Physician Assistant Dermatology  12/14/23     Phone: [phone] Fax: [phone]         [address] [facility] [address], [facility] [address] [facility] [address]     Chantel Montgomery [facility] Lead Care Coordinator  Admissions 12/28/23                 My Care Plans  Self Management and Treatment Plan    Care Plan  Care Plan: Community Resources       Problem: Lack of transportation               Problem: Unable to prepare meals               Problem: Reliable food source               Problem: Insufficient In-home support       Goal: Establish adequate home support       Start Date: 1/10/2024 Expected End Date: 7/10/2024    Note:     Barriers: Mobility Issues  Strengths: Connected with Care Coordination  Patient expressed understanding of goal: Yes  Action steps to achieve this goal:  1. I will review housekeeping resource sent by [facility]  2. I will connect with this resource if I feel it will be helpful  3. I will remain in communication with Care Coordination about barriers I encounter or additional resources needed                             Care Plan: General       Problem: HP GENERAL PROBLEM       Goal: Obtain Replacement CPAP       Start Date: 1/10/2024 Expected End Date: 7/10/2024    Note:     Barriers: Misplaced CPAP  Strengths: Connected with Care Coordination  Patient expressed understanding of goal: yes  Action steps to achieve this goal:  1. I will contact [facility] customer service to request a replacement machine  2. I will remain in communication with Care Coordination about additional resources needed or barriers I encounter                                Action Plans on File:                       Advance Care Plans/Directives:   Advanced Care Plan/Directives on file: No data recorded  Discussed with patient/caregiver(s): No data recorded           My Medical and Care Information  Problem List   Patient Active Problem List   Diagnosis    Low back pain    Hyperlipidemia    Hypertension goal BP (blood pressure) < 140/90    Type 2 diabetes mellitus (H)    Advanced directives, counseling/discussion    Migraine headache    History of diverticulitis    MENTAL HEALTH     Generalized abdominal pain    Light chain (AL) amyloidosis (H)    Insomnia due to medical condition    Obstructive sleep apnea syndrome    Restless legs syndrome (RLS)    Leg edema    Morbid obesity (H)    Venous stasis dermatitis of both lower extremities    Polyneuropathy, unspecified    Acquired lymphedema    Low blood pressure reading    History of peripheral stem cell transplant (H)    Other specified anxiety disorders    Abnormal electrocardiography    Bilateral leg edema    Onychomycosis    Thrombocytopenia, unspecified (H24)    Bilateral leg weakness    NSTEMI (non-ST elevated myocardial infarction) (H)    Essential hypertension    Peripheral vascular disease (H24)    Other cirrhosis of liver (H)    Lymphedema    Cellulitis of right lower extremity    Right upper quadrant pain    Abnormal computed tomography scan    Acquired pancytopenia (H)    Benign neoplasm of cecum    Benign neoplasm of transverse colon    Benign prostatic hyperplasia    Bilateral cataracts    Bipolar 1 disorder, manic, mild (H)    Cannabis dependence in remission (H)    Cardiovascular stress test abnormal    Chronic fatigue, unspecified    Continuous opioid dependence (H)    Contusion of rib    Coronary arteriosclerosis    DDD (degenerative disc disease), lumbar    Decreased testosterone level    Depressed bipolar I disorder in full remission (H24)    Chronic diarrhea    Hernia of anterior abdominal wall    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)    Guillain-[place] syndrome (H24)    History of anaphylaxis due to severe acute respiratory syndrome coronavirus 2 (SARS-CoV-2) vaccine    History of colonic polyps    Hypertensive renal disease    Hypocalcemia    Immunoglobulin deficiency (H24)    Localized enlarged lymph nodes    Nonalcoholic steatohepatitis    Noninfectious gastroenteritis    Other muscle spasm    Polyp of colon    Presence of implanted infusion pump    Retention of urine    Spinal stenosis at L4-L5  level    Chronic pain    Cerebral infarction, unspecified (H)    Abdominal pain, generalized    Nausea and vomiting, unspecified vomiting type    Cellulitis of lower extremity, unspecified laterality    Pneumonia of left upper lobe due to infectious organism      Current Medications and Allergies:  See printed Medication Report.    Care Coordination Start Date: 12/28/2023   Frequency of Care Coordination: No data recorded   Form Last Updated: 01/10/2024

## 2024-01-15 ENCOUNTER — TELEPHONE (OUTPATIENT)
Dept: FAMILY MEDICINE | Facility: CLINIC | Age: 69
End: 2024-01-15
Payer: COMMERCIAL

## 2024-01-15 ENCOUNTER — TELEPHONE (OUTPATIENT)
Dept: PHARMACY | Facility: CLINIC | Age: 69
End: 2024-01-15
Payer: COMMERCIAL

## 2024-01-15 NOTE — TELEPHONE ENCOUNTER
DE,  Patient calling regarding ozempic.  Patient states he has taken three doses of it and is noticing terrible side effects.  States he is having painful nausea and diarrhea.  Was completely incapacitated over the weekend.  Wondering if this is expected to go away or if this is normal?  Please advise.  Aggie LAMAS RN

## 2024-01-15 NOTE — TELEPHONE ENCOUNTER
Called to follow-up about medication side effects regarding Ozempic.     Patient says that he was unable to do any sort of work for the last 3 days. Has had loose stools and nausea starting about January 10th. The nausea was at it's worst 12th-14th where he couldn't do any work at all. Says that he was still able to eat vanilla yogurt but denies changes in diet.  Erickson says that the nausea/loose stools he was experiencing have since subsided, and is not experiencing them today      Ozempic doses    12/28/23- 0.25 mg- told to start at this dose by the dispensing pharmacist. Did not note any side effects       01/04/24 -0.5 mg- did not note any side effects until the 10th 01/12/24 -0.5 mg experienced severe nausea 12th-14th and symptoms (as noted above) have since subsided.    We discussed that the side effects can resolve with continued use, since he is no longer experiencing the side effects today. Recommended that he continue to monitor how he feels throughout the week and let me know if they return. Will follow-up with him on Thursday 01/18/23 to check back in on symptoms prior to his next dose.     We discussed the reason for switching to Ozempic was because Ozempic was accepting applicants for the patient assistance program and Trulicity was not. Patient verbalized understanding and expressed that he could not pay for Trulicity without patient assistance. Could consider reducing the dose to 0.25 mg if symptoms persist.    Roxann Hdz [facility] on 1/15/2024 at 3:21 PM

## 2024-01-15 NOTE — TELEPHONE ENCOUNTER
Roxann,  Do you mind reaching out to this patient about his side effects that he has been experiencing since starting Ozempic?  Thank you, DE

## 2024-01-16 ENCOUNTER — MYC MEDICAL ADVICE (OUTPATIENT)
Dept: FAMILY MEDICINE | Facility: CLINIC | Age: 69
End: 2024-01-16
Payer: COMMERCIAL

## 2024-01-16 DIAGNOSIS — S91.109A WOUND, OPEN, TOE WITH COMPLICATION, INITIAL ENCOUNTER: Primary | ICD-10-CM

## 2024-01-16 NOTE — TELEPHONE ENCOUNTER
DE,  Please see below MyChart message and advise.  Podiatry referral pended- not able to find recent in chart.  Thanks,  Aggie CAZARES RN

## 2024-01-18 ENCOUNTER — TELEPHONE (OUTPATIENT)
Dept: PHARMACY | Facility: CLINIC | Age: 69
End: 2024-01-18
Payer: COMMERCIAL

## 2024-01-18 NOTE — TELEPHONE ENCOUNTER
Called and spoke to Erickson.     He says that his nausea has gotten significantly better as the week has progressed. Expresses that he is still experiencing some loose stools, but is managing them.    Says that he wants to stick with the Ozempic for one more dose at 0.5 mg strength.     I will check back in with him on Tuesday 1/23. His symptoms were worse a few days after the dose was administered but seem to get better as the week progressed.     Roxann Hdz [facility] on 1/18/2024 at 12:58 PM

## 2024-01-23 ENCOUNTER — TELEPHONE (OUTPATIENT)
Dept: PHARMACY | Facility: CLINIC | Age: 69
End: 2024-01-23
Payer: COMMERCIAL

## 2024-01-23 NOTE — TELEPHONE ENCOUNTER
"Called and spoke to Erickson,     Checking in to see if the patient experienced any continued GI symptoms with the Ozempic. He said that he was hesitant about doing the injection on  Friday as usual, and administered the dose on Monday (01/22)- which would still be considered appropriate for \"missed doses\". He does not report any GI symptoms since the administration. Encouraged the patient to contact me if symptoms return.     Roxann Hdz [facility] on 1/23/2024 at 2:18 PM    "

## 2024-01-24 ENCOUNTER — PATIENT OUTREACH (OUTPATIENT)
Dept: CARE COORDINATION | Facility: CLINIC | Age: 69
End: 2024-01-24
Payer: COMMERCIAL

## 2024-01-24 ENCOUNTER — TELEPHONE (OUTPATIENT)
Dept: FAMILY MEDICINE | Facility: CLINIC | Age: 69
End: 2024-01-24
Payer: COMMERCIAL

## 2024-01-24 DIAGNOSIS — F31.76 DEPRESSED BIPOLAR I DISORDER IN FULL REMISSION (H): ICD-10-CM

## 2024-01-24 RX ORDER — DULOXETIN HYDROCHLORIDE 30 MG/1
30 CAPSULE, DELAYED RELEASE ORAL 2 TIMES DAILY
Qty: 180 CAPSULE | Refills: 3 | Status: SHIPPED | OUTPATIENT
Start: 2024-01-24 | End: 2024-02-14

## 2024-01-24 NOTE — TELEPHONE ENCOUNTER
Parmjit's Lantus application and Vraylar application has been submitted to the [employer] prescription assistance program.     We will note EPIC when [employer] has made their decision.     Thank you!    Anne Marie Powell   Prescription Assistance Assistant

## 2024-01-24 NOTE — TELEPHONE ENCOUNTER
FYI- Interoffice Ozempic application to Dr Radha Mcdermott and mailed to Erickson, please review, sign and return to me.    Erickson has been notified.    Barb Huerta  Prescription Assistance Supervisor  Pharmacy Assistance

## 2024-01-24 NOTE — TELEPHONE ENCOUNTER
I am in process of applying to the Cymbalta assistance program.  Maria De Jesus Kwon requires a hand signed, brand name, hard copy script be submitted with their application.    Please hand sign a BRAND name, hard copy script for:      CYMBALTA    Please send the HARD COPY script to me at--  I must submit this with the assistance application.    via interoffice mail  FPS Barb Navarro     or via [place] mail  at:   [facility]  Barb Huerta  [address] [place]  [address]

## 2024-01-25 ENCOUNTER — TELEPHONE (OUTPATIENT)
Dept: FAMILY MEDICINE | Facility: CLINIC | Age: 69
End: 2024-01-25

## 2024-01-25 NOTE — TELEPHONE ENCOUNTER
Forms/Letter Request    Type of form/letter: OTHER: Prescription assistance        Do we have the form/letter: Yes:     Who is the form from? [place] [facility]cription Assistance Program  (if other please explain)    Where did/will the form come from? form was mailed in  INTEROFFICE MAIL    When is form/letter needed by: asap    How would you like the form/letter returned:  INTEROFFICE MAIL,  envelope included     Patient Notified form requests are processed in 5-7 business days:No    Could we send this information to you in [facility] or would you prefer to receive a phone call?:   No preference   Okay to leave a detailed message?: No at Other phone number:      Barb Huerta, Prescription assistance supervisor- [phone]

## 2024-01-29 DIAGNOSIS — G47.33 OBSTRUCTIVE SLEEP APNEA SYNDROME: ICD-10-CM

## 2024-01-29 RX ORDER — MODAFINIL 200 MG/1
400 TABLET ORAL DAILY
Qty: 60 TABLET | Refills: 2 | Status: SHIPPED | OUTPATIENT
Start: 2024-01-29 | End: 2024-06-27

## 2024-02-02 ENCOUNTER — TELEPHONE (OUTPATIENT)
Dept: FAMILY MEDICINE | Facility: CLINIC | Age: 69
End: 2024-02-02
Payer: COMMERCIAL

## 2024-02-02 DIAGNOSIS — L84 CALLUS OF TOE: Primary | ICD-10-CM

## 2024-02-02 NOTE — TELEPHONE ENCOUNTER
DE,      Patient called.  Has large callus on he right big toe.  Requesting referral to podiatry  Podiatry at [facility] has not openings for a few months    Order T'd up.    Thank you,  Wandy Pena RN

## 2024-02-07 ENCOUNTER — TELEPHONE (OUTPATIENT)
Dept: GASTROENTEROLOGY | Facility: CLINIC | Age: 69
End: 2024-02-07
Payer: COMMERCIAL

## 2024-02-07 ENCOUNTER — TELEPHONE (OUTPATIENT)
Dept: FAMILY MEDICINE | Facility: CLINIC | Age: 69
End: 2024-02-07
Payer: COMMERCIAL

## 2024-02-07 ENCOUNTER — PATIENT OUTREACH (OUTPATIENT)
Dept: CARE COORDINATION | Facility: CLINIC | Age: 69
End: 2024-02-07
Payer: COMMERCIAL

## 2024-02-07 NOTE — TELEPHONE ENCOUNTER
I have submitted the Ozempic application to the [employer] assistance program.    I will note EPIC when a decision has been made.    Barb Huerta  Prescription Assistance Supervisor  Pharmacy Assistance

## 2024-02-14 ENCOUNTER — TELEPHONE (OUTPATIENT)
Dept: FAMILY MEDICINE | Facility: CLINIC | Age: 69
End: 2024-02-14

## 2024-02-14 ENCOUNTER — OFFICE VISIT (OUTPATIENT)
Dept: PULMONOLOGY | Facility: CLINIC | Age: 69
End: 2024-02-14
Payer: COMMERCIAL

## 2024-02-14 ENCOUNTER — OFFICE VISIT (OUTPATIENT)
Dept: NURSING | Facility: CLINIC | Age: 69
End: 2024-02-14
Payer: COMMERCIAL

## 2024-02-14 VITALS — OXYGEN SATURATION: 97 % | BODY MASS INDEX: 43.77 KG/M2 | WEIGHT: 271.2 LBS | HEART RATE: 91 BPM

## 2024-02-14 VITALS
OXYGEN SATURATION: 95 % | DIASTOLIC BLOOD PRESSURE: 78 MMHG | TEMPERATURE: 97.7 F | BODY MASS INDEX: 43.74 KG/M2 | SYSTOLIC BLOOD PRESSURE: 142 MMHG | WEIGHT: 271 LBS

## 2024-02-14 DIAGNOSIS — F31.76 DEPRESSED BIPOLAR I DISORDER IN FULL REMISSION (H): ICD-10-CM

## 2024-02-14 DIAGNOSIS — G47.33 OSA (OBSTRUCTIVE SLEEP APNEA): Primary | ICD-10-CM

## 2024-02-14 DIAGNOSIS — R06.09 CHRONIC DYSPNEA: ICD-10-CM

## 2024-02-14 PROCEDURE — 99215 OFFICE O/P EST HI 40 MIN: CPT | Mod: 25 | Performed by: INTERNAL MEDICINE

## 2024-02-14 PROCEDURE — 94729 DIFFUSING CAPACITY: CPT | Performed by: INTERNAL MEDICINE

## 2024-02-14 PROCEDURE — 94375 RESPIRATORY FLOW VOLUME LOOP: CPT | Performed by: INTERNAL MEDICINE

## 2024-02-14 PROCEDURE — 94726 PLETHYSMOGRAPHY LUNG VOLUMES: CPT | Performed by: INTERNAL MEDICINE

## 2024-02-14 RX ORDER — DULOXETIN HYDROCHLORIDE 30 MG/1
30 CAPSULE, DELAYED RELEASE ORAL 2 TIMES DAILY
Qty: 180 CAPSULE | Refills: 3 | Status: SHIPPED | OUTPATIENT
Start: 2024-02-14 | End: 2024-02-15

## 2024-02-14 ASSESSMENT — PAIN SCALES - GENERAL: PAINLEVEL: MODERATE PAIN (5)

## 2024-02-14 NOTE — TELEPHONE ENCOUNTER
---  2nd Request ---    I am in process of applying to the Cymbalta assistance program.  Maria De Jesus requires a hand signed, brand name, hard copy script be submitted with their application.    Please hand sign a BRAND name, hard copy script for:     CYMBALTA    Please send the HARD COPY script to me at--  I must submit this script with the assistance application.    via interoffice mail  FPS Barb Navarro     or via [place] mail  at:   [facility]  Barb Huerta  [address] [place]  [address]    Thanks so much for your help!    Barb Huerta  Prescription Assistance Supervisor  Pharmacy Assistance

## 2024-02-14 NOTE — PROGRESS NOTES
Pulmonary Clinic New Patient Consult  Reason for Consult: Acute combined respiratory failure/Dyspnea  History of Present Illness  I have the pleasure of seeing Parmjit Hernández, who is a pleasant 68 year old male with multiple co-morbidites including morbid obesity, psoriasis, NORMA previously on CPAP who presents for an evaluation of acute combined respiratory failure/dyspnea.  To briefly review, Erickson was admitted at [facility] in 12/2023 for acute combined respiratory failure due to pneumonia, morbid obesity and chronic opioid use. He was treated with antibiotics and BIPAP and he responded with eventual improvement . His hypoxic hypercapnic failure was thought to be multifactorial as he has not been using his CPAP for the last 6-months, recent increase in his morphine dosages in his morphine pump, renal failure with continued use of Lyrica and other sedating medications likely causing hypoxia and hypercapnea.  Medications were adjusted with dose of Lyrica was lowered. Patient noted that he will search for his home CPAP that got lost during a move as he notes that he feels better when he uses it.  He was discharged without need for supplemental O2.  He snores at night and occasionally sleepy during the day. Denies any SOB today. Chronic leg swellings thought to be due to lymphedema.   Never smoker and no family history of lung cancer.     Review of Systems:  10 of 14 systems reviewed and are negative unless otherwise stated in HPI.    Past Medical History:   Diagnosis Date    Acquired lymphedema 02/04/2019    Allergic state     Amyloidosis (H)     followed by hematology Dr. Romeo status post peripheral stem cell transplant    Anxiety 05/11/2016    Anxiety and depression 12/18/2013    Bipolar I disorder (H) 09/01/2015    Under care of [facility]- Dr Rahman doxepin 25 mg, 2 cap bedtime DULoxetine 20 mg once daily  OLANZapine 7.5 mg  1 tab bedtime     Cerebral infarction, unspecified (H) 07/13/2015     DDD (degenerative disc disease), lumbar 08/06/2020    Depressive disorder 1995    EKG abnormality 04/20/2020    H/O bone marrow transplant (H)     Headache(784.0)     History of diverticulitis 01/27/2015    1/15-rxed with antibiotics     Hyperlipidemia LDL goal <100 10/31/2010    Hypertension 2007    Hypertension goal BP (blood pressure) < 140/90 10/11/2011    Marianne (H) 08/20/2015    Morbid obesity (H) 08/28/2018    Myalgia and myositis, unspecified     Narcotic dependence (H) 09/06/2016    None in about 6 months- 8/1/17    NSTEMI (non-ST elevated myocardial infarction) (H) 04/19/2020    OCD (obsessive compulsive disorder)     Other acquired absence of organ 1994    Other cirrhosis of liver (H) possibly from vences  04/15/2020    Other specified viral warts     Peripheral edema 05/20/2018    Noncardiac Continue with  furosemide (LASIX) 20 MG tablet,  potassium       chloride SA (K-DUR/KLOR-CON M) 10 MEQ CR  Tab once daily  Basic metabolic panel    Polyneuropathy associated with underlying disease (H24) 02/04/2019    Restless legs syndrome (RLS) 06/29/2017    Stasis dermatitis of both legs 12/31/2018    Type 2 diabetes mellitus with other specified complication (H) 07/10/2017       Past Surgical History:   Procedure Laterality Date    ABDOMEN SURGERY  2007    abdominal hernia    BACK SURGERY  8/6/2020    BIOPSY  june 2015    negative    BMT PROTOCOL      for systemic amyloidosis    BONE MARROW BIOPSY, BONE SPECIMEN, NEEDLE/TROCAR N/A 6/8/2016    Procedure: BIOPSY BONE MARROW;  Surgeon: Nathan Agrawal MD;  Location:  GI    BONE MARROW BIOPSY, BONE SPECIMEN, NEEDLE/TROCAR N/A 2/20/2019    Procedure: BIOPSY BONE MARROW;  Surgeon: Michael Raygoza MD;  Location:  GI    CHOLECYSTECTOMY  1995    lap qian    COLONOSCOPY  2012    hx polyps    ESOPHAGOSCOPY, GASTROSCOPY, DUODENOSCOPY (EGD), COMBINED N/A 5/29/2015    Procedure: COMBINED ESOPHAGOSCOPY, GASTROSCOPY, DUODENOSCOPY (EGD), BIOPSY SINGLE OR  MULTIPLE;  Surgeon: John Jacob MD;  Location:  GI    HERNIA REPAIR, UMBILICAL  2006    [facility] NONSPECIFIC PROCEDURE  94    Cholecystectomy    [facility] NONSPECIFIC PROCEDURE  2000    repair deviated septum       Family History   Problem Relation Age of Onset    Cerebrovascular Disease Mother     C.A.D. Mother     Hypertension Mother     Alcohol/Drug Mother     Arthritis Mother     Cerebrovascular Disease Maternal Grandmother     C.A.D. Maternal Grandmother     Alcohol/Drug Maternal Grandmother     C.A.D. Father     Heart Disease Father     Hypertension Father     Alcohol/Drug Father     Allergies Father     Circulatory Father     Depression Father     Respiratory Father     Asthma Father     Alcohol/Drug Paternal Grandfather     Cerebrovascular Disease Paternal Grandfather     Depression Son     Psychotic Disorder Son         anxiety disorder    Depression Daughter     Cerebrovascular Disease Maternal Grandfather     Cerebrovascular Disease Paternal Grandmother     Diabetes Brother     Allergies Sister     Depression Sister     Gynecology Sister        Social History     Socioeconomic History    Marital status: Single     Spouse name: None    Number of children: 3    Years of education: None    Highest education level: None   Occupational History     Employer: [employer]   Tobacco Use    Smoking status: Never    Smokeless tobacco: Never   Vaping Use    Vaping Use: Never used   Substance and Sexual Activity    Alcohol use: Not Currently     Alcohol/week: 0.0 standard drinks of alcohol    Drug use: Not Currently     Types: Cocaine, Marijuana, Methamphetamines, Opiates, IV, Amphetamines, Barbiturates, Benzodiazepines, Codeine, Fentanyl, Hashish, Hydrocodone, Hydromorphone, LSD, Oxycodone    Sexual activity: Not Currently     Partners: Female     Birth control/protection: Abstinence   Other Topics Concern    Parent/sibling w/ CABG, MI or angioplasty before 65F 55M? No   Social History Narrative    Social  Documentation:05/27/2010        Balanced Diet: NO    Calcium intake: 3-4 per day    Caffeine: 2 per day    Exercise:  type of activity NO    Sunscreen: Yes    Seatbelts:  Yes    Self Breast Exam:  No - na    Self Testicular Exam: no    Physical/Emotional/Sexual Abuse: No     Do you feel safe in your environment? Yes        Cholesterol screen up to date: Yes    Eye Exam up to date: Yes    Dental Exam up to date: Yes    Pap smear up to date: Does Not Apply    Mammogram up to date: Does Not Apply    Dexa Scan up to date:NO    Colonoscopy up to date:2007    Immunizations up to date: YES    Glucose screen if over 40: Yes        Sofi Rosas CMA                 Social Determinants of Health     Financial Resource Strain: Low Risk  (1/4/2024)    Financial Resource Strain     Within the past 12 months, have you or your family members you live with been unable to get utilities (heat, electricity) when it was really needed?: No   Food Insecurity: High Risk (1/4/2024)    Food Insecurity     Within the past 12 months, did you worry that your food would run out before you got money to buy more?: Yes     Within the past 12 months, did the food you bought just not last and you didn t have money to get more?: No   Transportation Needs: Low Risk  (1/4/2024)    Transportation Needs     Within the past 12 months, has lack of transportation kept you from medical appointments, getting your medicines, non-medical meetings or appointments, work, or from getting things that you need?: No   Physical Activity: Unknown (5/13/2020)    Exercise Vital Sign     Days of Exercise per Week: 1 day   Interpersonal Safety: Low Risk  (10/11/2023)    Interpersonal Safety     Do you feel physically and emotionally safe where you currently live?: Yes     Within the past 12 months, have you been hit, slapped, kicked or otherwise physically hurt by someone?: No     Within the past 12 months, have you been humiliated or emotionally abused in other ways by  your partner or ex-partner?: No   Housing Stability: High Risk (1/4/2024)    Housing Stability     Do you have housing? : Yes     Are you worried about losing your housing?: Yes         Allergies   Allergen Reactions    Amoxicillin Diarrhea    Cephalexin Diarrhea    Liraglutide Other (See Comments), Headache and Unknown     Other reaction(s): Headache, Unknown  PN: migraines - Increased migraine frequency and severity      Sulfa Antibiotics Angioedema and Swelling     Pt has taken  Taken sulfa drugs orally without trouble. He had problems with Sulfa eye drops. Eye swelled up           Current Outpatient Medications:     acetaminophen (TYLENOL) 500 MG tablet, Take 1,000 mg by mouth every 8 hours as needed, Disp: , Rfl:     albuterol (PROAIR HFA/PROVENTIL HFA/VENTOLIN HFA) 108 (90 Base) MCG/ACT inhaler, Inhale 2 puffs into the lungs every 6 hours as needed for shortness of breath, wheezing or cough, Disp: 18 g, Rfl: 0    aspirin (ASPIRIN LOW DOSE) 81 MG tablet, Take 1 tablet (81 mg) by mouth daily, Disp: 30 tablet, Rfl: 3    atorvastatin (LIPITOR) 20 MG tablet, Take 1.5 tablets (30 mg) by mouth daily, Disp: 135 tablet, Rfl: 3    Bioflavonoid Products (CANDIDO-C) TABS, Take 1,000 mg by mouth daily, Disp: 90 tablet, Rfl: 3    blood glucose (NO BRAND SPECIFIED) lancets standard, Use to test blood sugar 1 time daily or as directed., Disp: 100 Lancet, Rfl: 4    blood glucose (NO BRAND SPECIFIED) test strip, Use to test blood sugar 1 time daily or as directed., Disp: 200 strip, Rfl: 1    buPROPion (WELLBUTRIN XL) 150 MG 24 hr tablet, Take 1 tablet (150 mg) by mouth every morning, Disp: 90 tablet, Rfl: 3    calcium carbonate (OS-LUIS) 1500 (600 Ca) MG tablet, Take 600 mg by mouth daily, Disp: , Rfl:     cariprazine (VRAYLAR) 4.5 MG capsule, Take 1 capsule (4.5 mg) by mouth daily, Disp: 90 capsule, Rfl: 3    cefuroxime (CEFTIN) 500 MG tablet, Take 1 tablet (500 mg) by mouth 2 times daily, Disp: 10 tablet, Rfl: 0    Continuous  Blood Gluc  (FREESTYLE VERA 2 READER) MARAL, Use to read blood sugars as per 's instructions., Disp: 1 each, Rfl: 0    Continuous Blood Gluc Sensor (FREESTYLE VERA 2 SENSOR) [facility] [place] Change every 14 days., Disp: 2 each, Rfl: 5    DULoxetine (CYMBALTA) 30 MG capsule, Take 1 capsule (30 mg) by mouth 2 times daily, Disp: 180 capsule, Rfl: 3    Folic Acid-Vit B6-Vit B12 0.5-5-0.2 MG TABS, Take 1 tablet by mouth daily, Disp: 90 tablet, Rfl: 3    glucose 40 % (400 mg/mL) gel, Take by mouth every 15 minutes as needed for low blood sugar, Disp: , Rfl:     insulin glargine (LANTUS PEN) 100 UNIT/ML pen, Inject 40 Units Subcutaneous at bedtime, Disp: , Rfl:     insulin pen needle (31G X 8 MM) 31G X 8 MM miscellaneous, Use one pen needles daily or as directed., Disp: 100 each, Rfl: 1    loperamide (IMODIUM) 2 MG capsule, Take 1 capsule (2 mg) by mouth 4 times daily as needed for diarrhea, Disp: , Rfl:     losartan (COZAAR) 100 MG tablet, Take 1 tablet (100 mg) by mouth daily, Disp: 90 tablet, Rfl: 3    medication given by implanted intrathecal pump, continuous Drug # 1: Fentanyl (Sublimaze) - Conc: 2000 mcg/mL - Total Dose / 24 hours: 1663.3 mcg Drug # 2: Bupivacaine (Marcaine)  - Conc: 20 mg/mL - Total Dose / 24 hours: 16.633 mg Drug # 3: Morphine (Duramorph or Infumorph)  - Conc: 9.6 mg/mL - Total Dose / 24 hours: 7.9836 mg  Pump Reservoir Volume: 40 mL Pump Reservoir Alarm Volume: 2 ml Outside Clinic & Provider: Dr. Pawan Shaw [facility] Last Refill Date: 12/21/23 Next Refill Date: 1/28/24 [place] Alarm Date: Pump : ???? SynchroMed II  Boluses: Up to 3 per day, 4 minute duration. Lock-out every 6 hours (also has dose restriction interval noted 1 per 8 hours)  Fentanyl: 110.1 mcg/dose Bupivacaine: 1.101 mg/dose Morphine: 0.5287 mg/dose, Disp: , Rfl:     metFORMIN (GLUCOPHAGE XR) 500 MG 24 hr tablet, Take 2 tablets (1,000 mg) by mouth 2 times daily (with meals), Disp: 360  tablet, Rfl: 3    metoprolol succinate ER (TOPROL XL) 100 MG 24 hr tablet, Take 1 tablet (100 mg) by mouth daily, Disp: 90 tablet, Rfl: 3    modafinil (PROVIGIL) 200 MG tablet, Take 2 tablets (400 mg) by mouth daily Pt has increased his dose to 400mg daily from 300mg daily.  Has a message in to his physician for a dose increase., Disp: 60 tablet, Rfl: 2    mometasone (ELOCON) 0.1 % external cream, Apply topically daily as needed For psoriasis on face, Disp: , Rfl:     multivitamin w/minerals (THERA-VIT-M) tablet, Take 1 tablet by mouth daily Men's 50+, Disp: , Rfl:     nitroGLYcerin (NITROSTAT) 0.4 MG sublingual tablet, Place 1 tablet (0.4 mg) under the tongue every 5 minutes as needed for chest pain For chest pain place 1 tablet under the tongue every 5 minutes for 3 doses. If symptoms persist 5 minutes after 1st dose call 911., Disp: 30 tablet, Rfl: 1    POTASSIUM PO, Take 1 tablet by mouth daily Unspecified tablet strength; patient estimated 600 mg, takes for leg cramps, Disp: , Rfl:     pregabalin (LYRICA) 100 MG capsule, Take 1 capsule (100 mg) by mouth at bedtime, Disp: , Rfl:     semaglutide (OZEMPIC) 2 MG/3ML pen, Inject 0.5 mg Subcutaneous every 7 days, Disp: 3 mL, Rfl: 2    semaglutide (OZEMPIC, 0.25 OR 0.5 MG/DOSE,) 2 MG/1.5ML SOPN pen, Inject 0.5 mg Subcutaneous every 7 days, Disp: , Rfl:     senna-docusate (SENOKOT-S/PERICOLACE) 8.6-50 MG tablet, Take 1-2 tablets by mouth 2 times daily as needed, Disp: , Rfl:     SKYRIZI  MG/ML subcutaneous, Inject 150 mg Subcutaneous once, Disp: , Rfl:     SUMAtriptan (IMITREX) 50 MG tablet, Take 1 tablet (50 mg) by mouth at onset of headache for migraine May repeat in 2 hours. Max 4 tablets/24 hours., Disp: 8 tablet, Rfl: 1    tamsulosin (FLOMAX) 0.4 MG capsule, Take 1 capsule (0.4 mg) by mouth 2 times daily, Disp: 180 capsule, Rfl: 3    testosterone (ANDROGEL/TESTIM) 50 MG/5GM (1%) topical gel, Place 1 packet (50 mg of testosterone) onto the skin daily, Disp:  "30 packet, Rfl: 1    vitamin B-Complex, Take 3 tablets by mouth daily, Disp: , Rfl:     vitamin D3 (CHOLECALCIFEROL) 50 mcg (2000 units) tablet, Take 1 tablet (50 mcg) by mouth daily, Disp: 90 tablet, Rfl: 3      Physical Exam:  BP (!) 142/78 (BP Location: Left arm, Patient Position: Sitting, Cuff Size: Adult Large)   Temp 97.7  F (36.5  C) (Oral)   Wt 122.9 kg (271 lb)   SpO2 95%   BMI 43.74 kg/m    GENERAL: Well developed, well nourished, alert, and in no apparent distress.  HEENT: Normocephalic, atraumatic. PERRL, EOMI. Oral mucosa is moist. No perioral cyanosis.  NECK: supple, no masses, no thyromegaly.  RESP:  Normal respiratory effort.  CTAB.  No rales, wheezes, rhonchi.  No cyanosis or clubbing.  CV: Normal S1, S2, regular rhythm, normal rate. No murmur.  No LE edema.   ABDOMEN:  Soft, non-tender, non-distended.   SKIN: Extensive psoriatic rash  NEURO: AAOx3.  Normal gait.  Fluent speech.  PSYCH: mentation appears normal.     Results:  PFTs: Reviewed and discussed with patient, normal PFTs  Most Recent [place] Pulmonary Function Testing    FVC-Pred   Date Value Ref Range Status   02/14/2024 3.41 L      FVC-Pre   Date Value Ref Range Status   02/14/2024 3.12 L      FVC-%Pred-Pre   Date Value Ref Range Status   02/14/2024 91 %      FEV1-Pre   Date Value Ref Range Status   02/14/2024 2.33 L      FEV1-%Pred-Pre   Date Value Ref Range Status   02/14/2024 87 %      FEV1FVC-Pred   Date Value Ref Range Status   02/14/2024 78 %      FEV1FVC-Pre   Date Value Ref Range Status   02/14/2024 75 %      No results found for: \"20029\"  FEFMax-Pred   Date Value Ref Range Status   02/14/2024 7.61 L/sec      FEFMax-Pre   Date Value Ref Range Status   02/14/2024 7.79 L/sec      FEFMax-%Pred-Pre   Date Value Ref Range Status   02/14/2024 102 %      ExpTime-Pre   Date Value Ref Range Status   02/14/2024 6.96 sec      FIFMax-Pre   Date Value Ref Range Status   02/14/2024 5.53 L/sec      FEV1FEV6-Pred   Date Value Ref Range Status " "  02/14/2024 78 %      FEV1FEV6-Pre   Date Value Ref Range Status   02/14/2024 76 %      No results found for: \"20055\"   Imaging (personally reviewed in clinic today):   Echo 5/10/2023  Final Impressions   1. Normal left ventricular chamber size, no regional wall motion abnormalities, calculated 2-D biplane volumetric ejection fraction 66%.   2. Mid left ventricular mean Doppler gradient with Valsalva 33 mm Hg.   3. Normal left ventricular filling pressure.   4. Normal right ventricular chamber size, normal systolic function, unable to detect peak tricuspid regurgitation velocity for pulmonary artery systolic pressure calculation.   5. No  significant valvular heart disease.   6. No  pericardial effusion.   7. Compared to the report of 07/25/2019 the following changes have occurred: dynamic mid ventricular gradient with Valsalva detected today.  Side by side comparison of images performed.     CXR 12/24/2023  IMPRESSION: Left lower lobe opacity, suspicious for infection. No pleural effusion or pneumothorax. Cardiac silhouette and mediastinal contours are normal.         Assessment and Plan:   Dyspnea/Acute combined respiratory failure/NORMA/?Obesity hypoventilation syndrome (OHS)/chronic opioid use/Pneumonia  Recent hospitalization and respiratory failure likely due to the constellation of above exacerbated by pneumonia and he has responded to antibiotics as well as brief NIV use during his hospital stay.  His respiratory issues are likely driven by morbid obesity/NORMA/suspected OHS as well as opioid use. He definitely will benefit from weight loss and procurement of a replacement CPAP for continued management of NORMA/OHS.  His PFTs today are reassuring with normal lung function. He denies any SOB which has since resolved. BNP and D dimer are unremarkable. He has been started on Ozempic and I will place a referral to see sleep medicine for re-qualification for CPAP/BIPAP.    Questions and concerns were answered to the " patient's satisfaction.  he was provided with my contact information should new questions or concerns arise in the interim.  he should return to clinic as needed  Up to date on vaccination  I spent 60 minutes on the date of the encounter doing chart review, history and exam, documentation and further coordination as noted above exclusive of time interpreting PFT, Chest Xray, CT Chest.  Dhruv Corea MD  Pulmonary, Critical Care and Sleep Medicine  [facility]  Office: [phone]      The above note was dictated using voice recognition software and may include typographical errors. Please contact the author for any clarifications.

## 2024-02-14 NOTE — TELEPHONE ENCOUNTER
TC's,      Can someone please call patient?  States he needs to schedule 3 appointments with [facility].    Preop, annual wellness and OV for BP follow up/edema - can use one of his virtual visits tomorrow 2/15 for this.      Thank you,  Wandy Pena RN

## 2024-02-14 NOTE — TELEPHONE ENCOUNTER
Erickson has been approved to  assistance programs for Vraylar & Ozempic through December 2024. He will receive this medication at no cost through the enrollment period.    A 120  day supply of Ozempic will be delivered to the [facility] within 10-14 business days. Please contact Erickson when this arrives to .    A 90 day supply of Vraylar will be delivered to Erickson's Home within 7-10 business days.    I have left Erickson a message to call me for this update.    Erickson will contact my office for refills of Ozempic as we must work directly with the .  I will note EPIC as each refill is requested.    Thanks so much for your help!    Barb Huerta  Prescription Assistance Supervisor  Pharmacy Assistance

## 2024-02-15 ENCOUNTER — VIRTUAL VISIT (OUTPATIENT)
Dept: FAMILY MEDICINE | Facility: CLINIC | Age: 69
End: 2024-02-15
Payer: COMMERCIAL

## 2024-02-15 ENCOUNTER — TELEPHONE (OUTPATIENT)
Dept: GASTROENTEROLOGY | Facility: CLINIC | Age: 69
End: 2024-02-15

## 2024-02-15 ENCOUNTER — TELEPHONE (OUTPATIENT)
Dept: FAMILY MEDICINE | Facility: CLINIC | Age: 69
End: 2024-02-15

## 2024-02-15 DIAGNOSIS — E66.01 MORBID OBESITY (H): ICD-10-CM

## 2024-02-15 DIAGNOSIS — Z12.11 COLON CANCER SCREENING: Primary | ICD-10-CM

## 2024-02-15 DIAGNOSIS — E11.69 TYPE 2 DIABETES MELLITUS WITH OTHER SPECIFIED COMPLICATION, WITHOUT LONG-TERM CURRENT USE OF INSULIN (H): ICD-10-CM

## 2024-02-15 DIAGNOSIS — L40.9 PSORIASIS: ICD-10-CM

## 2024-02-15 DIAGNOSIS — F31.76 DEPRESSED BIPOLAR I DISORDER IN FULL REMISSION (H): ICD-10-CM

## 2024-02-15 DIAGNOSIS — R60.0 BILATERAL LOWER EXTREMITY EDEMA: Primary | ICD-10-CM

## 2024-02-15 DIAGNOSIS — I10 HYPERTENSION GOAL BP (BLOOD PRESSURE) < 140/90: ICD-10-CM

## 2024-02-15 LAB
DLCOUNC-%PRED-PRE: 90 %
DLCOUNC-PRE: 20.83 ML/MIN/MMHG
DLCOUNC-PRED: 22.99 ML/MIN/MMHG
ERV-%PRED-PRE: 30 %
ERV-PRE: 0.38 L
ERV-PRED: 1.25 L
EXPTIME-PRE: 6.96 SEC
FEF2575-%PRED-PRE: 79 %
FEF2575-PRE: 1.74 L/SEC
FEF2575-PRED: 2.18 L/SEC
FEFMAX-%PRED-PRE: 102 %
FEFMAX-PRE: 7.79 L/SEC
FEFMAX-PRED: 7.61 L/SEC
FEV1-%PRED-PRE: 87 %
FEV1-PRE: 2.33 L
FEV1FEV6-PRE: 76 %
FEV1FEV6-PRED: 78 %
FEV1FVC-PRE: 75 %
FEV1FVC-PRED: 78 %
FEV1SVC-PRE: 71 %
FEV1SVC-PRED: 70 %
FIFMAX-PRE: 5.53 L/SEC
FRCPLETH-%PRED-PRE: 82 %
FRCPLETH-PRE: 2.83 L
FRCPLETH-PRED: 3.41 L
FVC-%PRED-PRE: 91 %
FVC-PRE: 3.12 L
FVC-PRED: 3.41 L
IC-%PRED-PRE: 115 %
IC-PRE: 2.89 L
IC-PRED: 2.5 L
RVPLETH-%PRED-PRE: 100 %
RVPLETH-PRE: 2.44 L
RVPLETH-PRED: 2.44 L
TLCPLETH-%PRED-PRE: 91 %
TLCPLETH-PRE: 5.72 L
TLCPLETH-PRED: 6.21 L
VA-%PRED-PRE: 92 %
VA-PRE: 5.07 L
VC-%PRED-PRE: 86 %
VC-PRE: 3.27 L
VC-PRED: 3.79 L

## 2024-02-15 PROCEDURE — 99215 OFFICE O/P EST HI 40 MIN: CPT | Mod: 95 | Performed by: FAMILY MEDICINE

## 2024-02-15 RX ORDER — BISACODYL 5 MG/1
TABLET, DELAYED RELEASE ORAL
Qty: 4 TABLET | Refills: 0 | Status: SHIPPED | OUTPATIENT
Start: 2024-02-15 | End: 2024-08-01

## 2024-02-15 RX ORDER — DULOXETIN HYDROCHLORIDE 30 MG/1
30 CAPSULE, DELAYED RELEASE ORAL 2 TIMES DAILY
Qty: 180 CAPSULE | Refills: 3 | Status: SHIPPED | OUTPATIENT
Start: 2024-02-15

## 2024-02-15 RX ORDER — FUROSEMIDE 20 MG
TABLET ORAL
Qty: 90 TABLET | Refills: 0 | Status: SHIPPED | OUTPATIENT
Start: 2024-02-15 | End: 2024-09-26

## 2024-02-15 NOTE — LETTER
February 15, 2024      Parmjit Hernández  [address]  [facility] [address]        Colonoscopy      Procedure date: 2/29/2024     Anticipated arrival time: 8:15 AM   (Procedure Times are Subject to Change)    Facility location: [facility]; [address] [place] [address] - Check in location: 1st Floor [facility]. Parking information: Self pay parking available in [facility]. The [facility] [place] is  located across [facility] from the hospital and is connected to the  hospital by a skyway. The skyway access is on Level C of the parking ramp.   parking is available Monday through Friday from 7 a.m.-3 p.m.  parking attendants are stationed at Door 2 at the [facility] entrance,  located off of [address]    Important Procedure Reminders:     A Pre-Operative Physical will be required for your upcoming procedure.   Please be sure to schedule a Pre-Operative physical with your Primary Care Provider within 30 days of your procedure date. If your primary care provider is outside the [facility] system, please fax report to [phone]     If you do not have a primary care provider, an in-person clinic appointment can also be scheduled with our Pre-Operative Assessment Center (PAC) at [facility].  Located at [address] [place] [address].     Virtual PAC appointments will not be accepted.  To schedule an in-person PAC visit, please call the Pre-Operative Assessment Center at [phone]    Prep Instructions:   Instructions on how to prepare for your upcoming procedure are found below. Please read instructions carefully. Deviation from instructions may result in less than desired outcomes and procedure may need to be rescheduled. If you have additional questions regarding how to prepare for your upcoming procedure, please contact our endoscopy pre assessment nurses at [phone] option 4.      Policy:   On the day of your  procedure, please designatean adult(s) who can drive you home stay with you for 24 hours post procedure. The medicines used in the exam will make you sleepy. You will not be able to drive. You cannot take public transportation, ride share services, or non-medical taxi service without a responsible caregiver.  Medical transport services are allowed with the requirement that a responsible caregiver will receive you at your destination.  We require that drivers and caregivers are confirmed prior to your procedure.    Day of procedure:  Please do not wear jewelry (i.e. earrings, rings, necklaces, watches, etc) .   Leave your purse, billfold, credit cards, and other valuables at home.   Bring insurance card and ID.     To cancel or reschedule your procedure:   Please call our endoscopy scheduling team at: [facility]: [phone], option 2. Monday through Friday, 7:00am-5:00pm.      Medication Reminders:    Please note the following medication holding recommendations:   Oral diabetic medication(s): Metformin (glucophage): HOLD day of procedure.  Injectable diabetic medication(s): Ozempic (Semaglutide). Weekly dosing of medication.  Hold 7 days before procedure.  Follow up with managing provider.   Insulin.  Consult with your managing provider.     ----------------------------------------------------------------------------------------------------------------------    Golytely Extended Colonoscopy Prep  Prep instructions for colonoscopy   Pre-Assessment Phone Number: [facility]; [phone] option 4    Your colonoscopy prep prescription has been sent to [facility] [place], [place] - [address]      Please read these instructions carefully at least 7 days prior to your colonoscopy procedure. Be sure to follow all directions completely. The inside of your colon must be clean to allow for a complete examination for the presence of any growths, polyps,  and/or abnormalities, as well as their biopsy or removal. A number of tips are included in order to make this part of the procedure as comfortable as possible.    Immediately:  You must arrange for an adult to drive you home after your exam. Your colonoscopy cannot be done unless you have a ride. If you need to use public transportation, someone must ride with you and stay with you for a minimum of 6-24 hours.  Check with your insurance company to be sure they will cover this exam.Arrange for a responsible adult to drive you home on the day of the exam. This cannot be a taxi or a bus as you will need someone with you after the procedure.      7 days prior:  Talk to your prescribing provider: If you take blood thinners (such as Coumadin, Plavix, Xarelto, Eliquis, Lovenox, or others), these medications may need to be stopped temporarily before your procedure. Your prescribing provider will tell you what to do.   Talk to your prescribing provider: If you take prescription NSAIDS (such as Sulindac, Celebrex, Mobic, Relafen). Your prescribing provider will tell you what to do.   If you have diabetes, you should request an early morning appointment.  Stop taking fiber supplements and multivitamins containing iron, or any other medications containing iron.  Fill your prescription for 2 containers of Golytely and 4 Dulcolax tablets at the pharmacy.  It is very important that you stay well hydrated during the colonoscopy prep. The Golytely bowel prep is designed to clean out your colon, but it will not provide hydration. While you are taking the prescribed prep, you should also drink 64 oz. of Gatorade or similar sports drink product to drink for staying hydrated. (Avoid red and purple colors)  Stop eating corn, popcorn, nuts and foods with seeds.   Begin a restricted or low fiber diet (see list below).    2 days prior:  Drink fluids to be well hydrated, this is important. Drink at least 4 large glasses of water, Gatorade,  or other similar sports drinks.  It is a good idea to use Vaseline on the skin around your anus after each bowel movement to prevent irritation. Wet wipes also help to reduce irritation.   You can have a light, low-fiber breakfast. You may also have a light, low-fiber lunch.  No solid foods after 1pm. Begin a clear liquid diet. (see list below).  At 4pm, take 2 Dulcolax (bisacodyl) tablets.  At 5pm, mix and drink half of a jug of Golytely bowel prep. Drink an 8 oz. Glass of Golytely every 10-15 minutes until half of the jug is gone. Place the remainder of the Golytely in the refrigerator. Stay close to the bathroom.     1 day prior:  Continue clear liquid diet only (see examples below). Do not eat solid food this day.  Do not drink any red or purple colored drinks.  Stop taking NSAID pain relievers, such as Advil, Ibuprofen, Motrin, etc.  You may take Tylenol.  At 4 pm, take 2 Dulcolax (bisacodyl) tablets.  At 5 pm, drink the 2nd half of a jug of Golytely bowel prep. Drink an 8 oz. glass of Golytely every 10-15 minutes until the 1st jug of Golytely is gone.   The Golytely bowel prep will not keep you hydrated. You should drink 8-10 glasses of clear liquids throughout the day.  Before you go to bed, mix the 2nd container of Golytely and place in the refrigerator for the morning.    Procedure day:  6 hours before your check-in time, drink an 8 oz. Glass of Golytely every 10-15 minutes until half of the 2nd jug of Golytely is gone. You WILL NOT drink the entire 2nd jug of Golytely.   You may take your necessary morning medications with sips of water  Do not take diabetes medicine by mouth until after your exam.  You may drink clear liquids only up until 2 hours before your arrival time.  Do not smoke, chew tobacco, or swallow anything, including water or gum for at least 2 hours before your arrival time. This is a safety issue. Your procedure could be cancelled if you do not follow directions.  Please do not wear  jewelry (i.e. earrings, rings, necklaces, watches, etc) . Leave your purse, billfold, credit cards, and other valuables at home.   Please arrive with a responsible adult who can take you home after the test and stay with you for a minimum of 24 hours: The medicine used will make you sleepy and forgetful. If you do not have someone to take you home, we will cancel your procedure. If using public transportation you must have someone to ride with you.  Please perform your nebulizer treatments and airway clearance therapy in the morning prior to the procedure (if applicable).  If you have asthma, bring your inhalers.    CLEAR LIQUID DIET   You may have:  Water, tea, coffee (no milk or cream)  Soda pop, Gatorade (not red or purple)  Jell-O, Popsicles (no milk or fruit pieces - not red or purple)  Fat-free soup broth or bouillon  Plain hard candy, such as clear life savers (not red or purple)  Clear juices and fruit-flavored drinks, such as apple juice, white grape juice, Hi-C, and Tre-Aid (not red or purple)   Do not have:  Milk or milk products such as ice cream, malts or shakes, or coffee creamer  Red or purple drinks of any kind such as cranberry juice or grape juice. Avoid red or purple Jell-O, Popsicles, Tre-Aid, sorbet, sherbet and candy  Juices with pulp such as orange, grapefruit, pineapple or tomato juice  Cream soups of any kind  Alcohol and beer  Protein drinks or protein powder     LOW FIBER DIET   You may have:    Starches: White bread, rolls, biscuits, croissants, Lucie toast, white flour tortillas, waffles, pancakes, Guatemalan toast; white rice, noodles, pasta, macaroni; cooked and peeled potatoes; plain crackers, saltines; cooked farina or cream of rice; puffed rice, corn flakes, Rice Krispies, Special K   Vegetables: tender cooked and canned, vegetable broths  Fruits and fruit juices: Strained fruit juice, canned fruit without seeds or skin (not pineapple), applesauce, pear sauce, ripe bananas, melons  (not watermelon)   Milk products: Milk (plain or flavored), cheese, cottage cheese, yogurt (no berries), custard, ice cream    Proteins: Tender, well-cooked ground beef, lamb, veal, ham, pork, chicken, turkey, fish or organ meats; eggs; creamy peanut butter   Fats and condiments:  Margarine, butter, oils, mayonnaise, sour cream, salad dressing, plain gravy; spices, cooked herbs; sugar, clear jelly, honey, syrup   Snacks, sweets and drinks: Pretzels, hard candy; plain cakes and cookies (no nuts or seeds); gelatin, plain pudding, sherbet, Popsicles; coffee, tea, carbonated ( fizzy ) drinks Do not have:    Starches: Breads or rolls that contain nuts, seeds or fruit; whole wheat or whole grain breads that contain more than 1 gram of fiber per slice; cornbread; corn or whole wheat tortillas; potatoes with skin; brown rice, wild rice, kasha (buckwheat), and oatmeal   Vegetables: Any raw or steamed vegetables; vegetables with seeds; corn in any form   Fruits and fruit juices: Prunes, prune juice, raisins and other dried fruits, berries and other fruits with seeds, canned pineapple juices with pulp such as orange, grapefruit, pineapple or tomato juice  Milk products: Any yogurt with nuts, seeds or berries   Proteins: Tough, fibrous meats with gristle; cooked dried beans, peas or lentils; crunchy peanut butter  Fats and condiments: Pickles, olives, relish, horseradish; jam, marmalade, preserves   Snacks, sweets and drinks: Popcorn, nuts, seeds, granola, coconut, candies made with nuts or seeds; all desserts that contain nuts, seeds, raisins and other dried fruits, coconut, whole grains or bran.      FAQ:    How do you know if your colon is cleaned out?   After completing the bowel prep, your bowel movements should be all liquid and yellow. Your bowel movements will look similar to urine in the toilet. If there are pieces of stool (poop) in the toilet, or if you can't see to the bottom of the toilet, please call our office  for advice. Call [phone] and ask to speak with a nurse.   Why do you need a responsible  to take you home and stay with you?  We require a responsible adult to take you home for your safety. The sedation medicines used to relax you during the procedure can impair your judgement and reaction time, make you forgetful and possible a little unsteady. Do not drive, make any important decisions, or sign any legal documents for 24 hours after your procedure.   It is normal to feel bloated and gassy after your procedure. Walking will help move the air through your colon. You can take non-aspirin pain relievers that contain acetaminophen (Tylenol).   When can you eat after your procedure?  You may resume your normal diet when you feel ready, unless advised otherwise by the doctor performing your procedure. Do not drink alcohol for 24 hours after your procedure.   You many resume normal activities (work, exercise, etc.) after 24 hours.   When will you get test results?  You should have your procedure results and any lab results (if applicable) by letter, [employer] message, or phone call within 2 weeks. If you have any questions, please call the doctor that referred you for the procedure.       Thank you for choosing [facility], for your procedure. If you are sent a survey regarding your care, please take the time to complete the questionnaire. We value your feedback!

## 2024-02-15 NOTE — TELEPHONE ENCOUNTER
Pre assessment completed for upcoming procedure.    Procedure details:    Arrival time and facility location reviewed.    Pre op exam needed? Yes.  Patient needs to complete a pre-operative exam prior to procedure.  Informed patient pre-op is needed via [employer] message    Designated  policy reviewed. Instructed to have someone stay 24 hours post procedure.     COVID policy reviewed.      Medication review:    Medications reviewed. Please see supporting documentation below. Holding recommendations discussed (if applicable). Per patient he has not started his insulin pump yet, but has orders to. Patient states he may start before his procedure. Instructed patient to consult with managing provider.       Prep for procedure:     Reviewed procedure prep instructions. Discussed reaction listed to COVID 19 vaccine. Patient denied any allergic reaction to COVID 19 vaccine or Polyethylene glycol. Patient reports he has tolerated Miralax and Golytely in the past.     Patient requested to have prep instructions sent via [employer] and letter. Prep instructions updated and sent per patients request.     Bowel prep has been sent to [facility] [place], [place] - [address], [address]      Patient verbalized understanding and had no questions or concerns at this time.        Marva Huber RN  Endoscopy Procedure Pre Assessment RN  [phone] option 4

## 2024-02-15 NOTE — PROGRESS NOTES
Erickson is a 68 year old who is being evaluated via a billable video visit.      How would you like to obtain your AVS? MyChart  If the video visit is dropped, the invitation should be resent by: Text to cell phone: [phone]  Will anyone else be joining your video visit? No          Assessment & Plan     Bilateral lower extremity edema  I recommended he restart furosemide.  He is going to take 40 mg daily for 7 days then 20 mg daily.  We will reevaluate symptoms when he is back in the clinic for his next office appointment and can recheck labs at that time.  - furosemide (LASIX) 20 MG tablet; Take 2 tablets (40 mg) daily x 7 days. Then take 1  tablet (20 mg) daily    Type 2 diabetes mellitus with other specified complication, without long-term current use of insulin (H)  He has been off Ozempic due to insurance cost issues, but this medication was recently approved and they are mailing his prescription to the clinic.  Unfortunately, it will not get in for at least a week, may be more.  In the meantime, I recommended he increase the Lantus dose from 40 to 50 units daily and continue taking metformin XR 1000 mg twice daily.  If the glucose levels continue to be high we could move the Lantus dose up if needed.    Depressed bipolar I disorder in full remission (H24)  He was given a refill of duloxetine which has been helpful in controlling mental health symptoms.  - DULoxetine (CYMBALTA) 30 MG capsule; Take 1 capsule (30 mg) by mouth 2 times daily    Hypertension goal BP (blood pressure) < 140/90  He feels like blood pressure has been under better control lately on his current medications.    Morbid obesity (H)  He continues to work on lifestyle modifications to help with weight loss.    Psoriasis  He recently restarted Skyrizi and is following with his dermatologist.      42 minutes spent by me on the date of the encounter doing chart review, review of test results, interpretation of tests, patient visit, and  deni Almendarez is a 68 year old, presenting for the following health issues:  No chief complaint on file.        2/15/2024    10:28 AM   Additional Questions   Roomed by Xavier RUIZ     HPI           He scheduled a virtual appointment today primarily to talk about significant swelling he is experiencing in both legs over the past few weeks.  He has a complex medical history that includes peripheral vascular disease, peripheral neuropathy, coronary artery disease, history of NSTEMI with stent placement, amyloidosis, anxiety, depression, psoriasis, bipolar 1 disorder, hypertension, diabetes mellitus type 2, hyperlipidemia, MEJIAS with liver cirrhosis, plaque psoriasis, restless leg syndrome, polyneuropathy, obstructive sleep apnea not using CPAP for the last 6 months, chronic pain with pain pump containing morphine and fentanyl.  In the past he has used Lasix for lower extremity edema symptoms which has been helpful.  He denies having significant erythema or any open wounds in the legs at this time.  He is not having fevers.  He reports that the plaque psoriasis has been quite severe lately.  He is following closely with his dermatologist and recently restarted Skyrizi.    He also reports that his blood sugar levels have been running in the mid 200s-mid 300s.  On 9/18/2023 his hemoglobin A1c was 9.8%.  He is currently taking Lantus 40 units daily and metformin 1000 mg twice daily.  He has a prescription for Ozempic which he has been off of due to insurance cost issues.  We recently got this medication approved and they are in the process of mailing it to our clinic.  It likely will not get in for at least a week though.      Review of Systems  Constitutional, HEENT, cardiovascular, pulmonary, GI, , musculoskeletal, neuro, skin, endocrine and psych systems are negative, except as otherwise noted.      Objective           Vitals:  No vitals were obtained today due to virtual visit.    Physical  Exam   GENERAL: alert and no distress  EYES: Eyes grossly normal to inspection.  No discharge or erythema, or obvious scleral/conjunctival abnormalities.  RESP: No audible wheeze, cough, or visible cyanosis.    SKIN: There is significant plaque psoriasis visualized on both forearms  NEURO: Cranial nerves grossly intact.  Mentation and speech appropriate for age.  PSYCH: Appropriate affect, tone, and pace of words          Video-Visit Details    Type of service:  Video Visit   Video Start Time: 1:40 PM  Video End Time:2:05 PM    Originating Location (pt. Location): Home    Distant Location (provider location):  On-site  Platform used for Video Visit: Danny  Signed Electronically by: Sherwin Mcdermott DO

## 2024-02-15 NOTE — TELEPHONE ENCOUNTER
Per patient problem list, patient had an anaphylactic reaction to COVID 19 vaccine. Polyethylene glycol is not listed as an allergy. Please discuss with patient.     -----------------------------------------------------------------------------------------------------------------      Pre visit planning completed.      Procedure details:    Patient scheduled for Colonoscopy  on 2/29/2024 .     Arrival time: 0815. Procedure time 0915    Pre op exam needed? Yes. Pre op exam scheduled on 2/27/2024 with Sherwin Mejia DO     Facility location: [facility]; [address] [place] [address]    Sedation type: MAC    Indication for procedure: Screening       Chart review:     Electronic implanted devices? No; patient has a pain pump.     Recent diagnosis of diverticulitis within the last 6 weeks? No    Diabetic? Yes. See medication holding recommendations     Diabetic medication HOLDING recommendations: (if applicable)  Oral diabetic medications: Yes:  Metformin (glucophage): HOLD day of procedure.  Diabetic injectables: Yes- Ozempic (Semaglutide). Weekly dosing of medication.  Hold 7 days before procedure.  Follow up with managing provider.   Insulin: Yes. Consult with managing provider   Insulin Pump: Yes.  Consult with managing provider.     Medication review:    Anticoagulants? No    NSAIDS? No NSAID medications per patient's medication list.  RN will verify with pre-assessment call.    Other medication HOLDING recommendations:  N/A      Prep for procedure:     Bowel prep recommendation: Extended prep Golytely    Due to:  BMI > 40. , constipation noted or reported. , and diabetes.     Prep instructions were not sent, will need to discuss with patient.      Marva Huber RN  Endoscopy Procedure Pre Assessment RN  [phone] option 4

## 2024-02-15 NOTE — TELEPHONE ENCOUNTER
MTM,   Patient calling  Hoping to check in regarding questions about his insulin pump and Ozempic if possible  Thanks,  Divina AGRAWAL RN

## 2024-02-16 ENCOUNTER — TELEPHONE (OUTPATIENT)
Dept: PHARMACY | Facility: CLINIC | Age: 69
End: 2024-02-16
Payer: COMMERCIAL

## 2024-02-16 NOTE — TELEPHONE ENCOUNTER
Called and talked to Erickson about his concerns regarding the Ozempic and insulin pump.     Discussed Ozempic would be delivered to the [facility]. The patient was aware because of his visit with Dr. Radha Mcdermott yesterday. He received a message from [employer] saying that he was approved and wasn't sure if this was regarding the Ozempic or the pump. Explained that [employer] is the  of Ozempic. I asked Erickson to explain more about where the insulin pump was coming from as I could not find detailed notes in the chart. He was unable to elaborate because he was getting ready to go out of town for the weekend.     Plan to follow-up with Erickson on Tuesday for more information regarding the insulin pump.     Roxann Hdz [facility] on 2/16/2024 at 4:52 PM

## 2024-02-16 NOTE — NURSING NOTE
"Oncology Rooming Note    October 4, 2023 2:56 PM   Parmjit Hernández is a 68 year old male who presents for:    Chief Complaint   Patient presents with    Blood Draw     Labs drawn via  by RN in lab.  VS taken    Oncology Clinic Visit     Benign neoplasm of transverse colon     Initial Vitals: BP (!) 143/93   Pulse 88   Temp 98.8  F (37.1  C) (Oral)   Resp 16   Wt 117.2 kg (258 lb 6.4 oz)   SpO2 93%   BMI 41.71 kg/m   Estimated body mass index is 41.71 kg/m  as calculated from the following:    Height as of 8/31/23: 1.676 m (5' 6\").    Weight as of this encounter: 117.2 kg (258 lb 6.4 oz). Body surface area is 2.34 meters squared.  No Pain (0) Comment: Data Unavailable   No LMP for male patient.  Allergies reviewed: Yes  Medications reviewed: Yes    Medications: Medication refills not needed today.  Pharmacy name entered into [facility]: [facility] [place], [place] - [address], [address]    Clinical concerns: none      Bernie Andino, EMT  10/4/2023              " blunt

## 2024-02-21 ENCOUNTER — PATIENT OUTREACH (OUTPATIENT)
Dept: CARE COORDINATION | Facility: CLINIC | Age: 69
End: 2024-02-21
Payer: COMMERCIAL

## 2024-02-21 NOTE — PROGRESS NOTES
Clinic Care Coordination Contact  Community Health Worker Follow Up    Care Gaps: Pt has preventative care visit scheduled with Dr. Radha Mcdermott on 4/2/24.     Health Maintenance Due   Topic Date Due    CONTROLLED SUBSTANCE AGREEMENT FOR CHRONIC PAIN MANAGEMENT  Never done    RSV VACCINE (Pregnancy & 60+) (1 - 1-dose 60+ series) Never done    IPV IMMUNIZATION (3 of 3 - Adult catch-up series) 07/22/2020    COLORECTAL CANCER SCREENING  08/01/2021    HEPATITIS A IMMUNIZATION (2 of 2 - Risk 2-dose series) 11/16/2022    MEDICARE ANNUAL WELLNESS VISIT  02/23/2024    LIPID  02/23/2024    MICROALBUMIN  02/23/2024    FALL RISK ASSESSMENT  03/03/2024       Care Plan:   Care Plan: Community Resources       Problem: Lack of transportation               Problem: Unable to prepare meals               Problem: Reliable food source               Problem: Insufficient In-home support       Goal: Establish adequate home support       Start Date: 1/10/2024 Expected End Date: 7/10/2024    This Visit's Progress: 10%    Note:     Barriers: Mobility Issues  Strengths: Connected with Care Coordination  Patient expressed understanding of goal: Yes  Action steps to achieve this goal:  1. I will review housekeeping resource sent by [facility]  2. I will connect with this resource if I feel it will be helpful  3. I will remain in communication with Care Coordination about barriers I encounter or additional resources needed                             Care Plan: General       Problem: HP GENERAL PROBLEM       Goal: Obtain Replacement CPAP       Start Date: 1/10/2024 Expected End Date: 7/10/2024    This Visit's Progress: 10%    Note:     Barriers: Misplaced CPAP  Strengths: Connected with Care Coordination  Patient expressed understanding of goal: yes  Action steps to achieve this goal:  1. I will contact [employer] service to request a replacement machine  2. I will remain in communication with Care Coordination about additional resources  "needed or barriers I encounter                                Intervention and Education during outreach:   Spoke to pt this afternoon:  [employer] CPAP company - pt has not called to ask about replacement CPAP which got lost in his move. \"I have not been feeling well lately.\"  [employer] - resources sent via [employer] by GAVINO Golden SW, on 1/10/24. Pt is aware they were sent, but, again, hasn't been feeling well and hasn't call out to [employer].   Pt aware of upcoming 2/27/24 pre-op visit with Dr. Radha Mcdermott. Pt states this pre-op visit is for a back surgery in 2-4 weeks.   Pt is aware of 2/29/24 colonoscopy. Pt is not sure he will go through with the colonoscopy based on how he is feeling. If he does go through with it, he has transportation arranged for the procedure.  CHW asks if pt has any further concerns or need for resources as this time. Pt states he does not. CHW made sure pt has CHW contact information. Pt will contact CHW with questions or updates before next outreach.     CHW Plan: CHW will follow up with pt in one month.    Alecia Hanley  Community Health Worker  [facility]  [phone]      "

## 2024-02-23 ENCOUNTER — TELEPHONE (OUTPATIENT)
Dept: FAMILY MEDICINE | Facility: CLINIC | Age: 69
End: 2024-02-23
Payer: COMMERCIAL

## 2024-02-23 ENCOUNTER — TELEPHONE (OUTPATIENT)
Dept: GASTROENTEROLOGY | Facility: CLINIC | Age: 69
End: 2024-02-23
Payer: COMMERCIAL

## 2024-02-23 NOTE — TELEPHONE ENCOUNTER
I submitted the Cymbalta application to the [facility] assistance program.    I will note EPIC when a decision has been made.    Thanks so much for your help!    Barb Huerta  Prescription Assistance Supervisor  Pharmacy Assistance

## 2024-02-23 NOTE — TELEPHONE ENCOUNTER
Caller: Erickson    Reason for Reschedule/Cancellation   (please be detailed, any staff messages or encounters to note?): has back surgery      Prior to reschedule please review:  Ordering Provider: GUILHERME SMITH   Sedation Determined: MAC  Does patient have any ASC Exclusions, please identify?: NORMA and cirrhosis       Notes on Cancelled Procedure:  Procedure: Lower Endoscopy [Colonoscopy]   Date: 2/29  Location: [facility]; [address] [place] [address]   Surgeon: Cierra      Rescheduled: Yes,   Procedure: Lower Endoscopy [Colonoscopy]    Date: 5/20   Location: [facility]; [address] [place] [address]    Surgeon: Jannet   Sedation Level Scheduled  MAC,  Reason for Sedation Level order   Instructions updated and sent: y    Does patient need PAC or Pre -Op Rescheduled? : no       Did you cancel or rescheduled an EUS procedure? No.

## 2024-02-26 ENCOUNTER — NURSE TRIAGE (OUTPATIENT)
Dept: FAMILY MEDICINE | Facility: CLINIC | Age: 69
End: 2024-02-26
Payer: COMMERCIAL

## 2024-02-26 NOTE — TELEPHONE ENCOUNTER
"Patient calling with concern that he is having a stroke  Was feeling woozy- \"almost high\" before he took a nap this afternoon.  Woke up about an hour ago, and numbness was present- barely able to move arm on left side.  Has not gone away in the last hour.  Fingers are numb, having a hard time lifting the phone.  One sister had a stroke, other sister had near stroke.  Given family history and lengthiness of numbness symptoms, RN advised patient to be seen today to ensure safety.  Patient in agreement with plan.    Aggie LAMAS RN     Reason for Disposition   Neurologic deficit that was brief (now gone), ANY of the following: * Weakness of the face, arm, or leg on one side of the body * Numbness of the face, arm, or leg on one side of the body * Loss of speech or garbled speech    Additional Information   Negative: Difficult to awaken or acting confused (e.g., disoriented, slurred speech)   Negative: New neurologic deficit that is present NOW, sudden onset of ANY of the following: * Weakness of the face, arm, or leg on one side of the body* Numbness of the face, arm, or leg on one side of the body* Loss of speech or garbled speech   Negative: Sounds like a life-threatening emergency to the triager   Negative: SEVERE weakness (i.e., unable to walk or barely able to walk, requires support) and new-onset or worsening   Negative: Confusion, disorientation, or hallucinations is main symptom   Negative: Dizziness is main symptom   Negative: Followed a head injury within last 3 days   Negative: Headache (with neurologic deficit)   Negative: Unable to urinate (or only a few drops) and bladder feels very full   Negative: Loss of bladder or bowel control (urine or bowel incontinence; wetting self, leaking stool) of new-onset   Negative: Back pain with numbness (loss of sensation) in groin or rectal area    Protocols used: Neurologic Deficit-A-OH    "

## 2024-02-28 ENCOUNTER — TELEPHONE (OUTPATIENT)
Dept: FAMILY MEDICINE | Facility: CLINIC | Age: 69
End: 2024-02-28
Payer: COMMERCIAL

## 2024-02-28 NOTE — TELEPHONE ENCOUNTER
Pt calling to get the name of his neurologist and the phone number. Thanks    Janine Rice RN  [facility]

## 2024-03-06 ENCOUNTER — TELEPHONE (OUTPATIENT)
Dept: FAMILY MEDICINE | Facility: CLINIC | Age: 69
End: 2024-03-06
Payer: COMMERCIAL

## 2024-03-07 NOTE — TELEPHONE ENCOUNTER
Please let Erickson know that hydrochlorothiazide is not currently on his med list. However, he was recently prescribed furosemide (Lasix) which is also a diuretic. It looks like he is due for a follow up visit with [facility] pharmacy. Please help him schedule this if possible. Below is a recent message that pharmacy sent him:  Thanks, DE      Renate Almendarez -      Our records show that you are due for a Medication Therapy Management (MTM) appointment. This is an appointment to meet with a pharmacist in the clinic to make sure you get the most out of your medications.  Your medications play an important role in your health.  We would like to meet with you to review how your medications are working for you and to answer any questions you may have.        We are available in the clinic on Tuesday and Thursday for phone, video or in-person appointments.  To make an appointment, please call the clinic at [phone] or the [facility] scheduling line at [phone].     We hope to see you soon!         Sincerely,        Roxann Hdz, GabinoD  Gabino VizcainoD, TOSHIA, BCACP   Medication Therapy Management Providers, [facility]

## 2024-03-13 ENCOUNTER — PATIENT OUTREACH (OUTPATIENT)
Dept: CARE COORDINATION | Facility: CLINIC | Age: 69
End: 2024-03-13
Payer: COMMERCIAL

## 2024-03-13 NOTE — LETTER
[facility]  Patient Centered Plan of Care  About Me:        Patient Name:  Parmjit Prescott    YOB: 1955  Age:         68 year old   Jack MRN:    [id number] Telephone Information:  Home Phone [phone]   Mobile [phone]       Address:  [address]  [facility] [place] [facility] [address] Email address:  [email or web address]      Emergency Contact(s)    Name Relationship Lgl Grd Work Phone Home Phone Mobile Phone   1. JC PRESCOTT Daughter   [phone]   2. POOR,DON Friend   [phone]           Primary language:  English     needed? No   [employer]:  [phone] op. 1  Other communication barriers:No data recorded  Preferred Method of Communication:  Emiliana  Current living arrangement: I live alone    Mobility Status/ Medical Equipment: Independent w/Device        Health Maintenance  Health Maintenance Reviewed: No data recorded    My Access Plan  Medical Emergency 911   Primary Clinic Line [facility] [phone]   24 Hour Appointment Line [phone] or  [phone] ([phone]) (toll-free)   24 Hour Nurse Line [phone] (toll-free)   Preferred Urgent Care [facility], [phone]     Preferred Hospital [facility]  [phone]     Preferred Pharmacy [facility] [address]     Behavioral Health Crisis Line The National Suicide Prevention Lifeline at 1-390.936.4087 or Text/Call 498           My Care Team Members  Patient Care Team         Relationship Specialty Notifications Start End    Sherwin Mejia DO PCP - General Family Medicine  3/1/23     zreferred ot Derm    Phone: [phone] Fax: [phone] [address] [address]    Kuldeep Borrero MD MD Gastroenterology  1/11/16     Phone: [phone] Fax: [phone]         [address]  [place] [address] [facility] [address]    Sherwin Mejia DO Assigned PCP   3/11/23     Phone: [phone] Fax: [phone]         [address] [facility] [address]    Lisseth Arana DPM Assigned Surgical Provider   3/11/23     Phone: [phone] Fax: [phone] [address] [facility] [address] [facility] [address]    Dylan Jose MD MD Neurology  3/15/23     Phone: [phone] Fax: [phone]         [address] [facility] [address]    Kera Jernigan APRN CNP Nurse Practitioner Cardiovascular Disease  3/16/23     Phone: [phone] Fax: [phone] [address] [facility] [address]    Charly Hester MD Assigned Cancer Care Provider   3/25/23     Phone: [phone] Fax: [phone]         [address] [facility]  [facility] [address]    Diana Bobo, RN Specialty Care Coordinator Hematology & Oncology Admissions 3/27/23     Rodrick Ferrer MD Assigned Heart and Vascular Provider   4/8/23     Phone: [phone] Fax: [phone] [address] [facility] [address]    Dylan Jose MD Assigned Neuroscience Provider   9/23/23     Phone: [phone] Fax: [phone]         [address] [facility] [address]    Roxann Hdz, [facility] Pharmacist Pharmacist  11/2/23     Phone: [phone] Fax: [phone] [address] [facility] [address]    Dhruv Corea MD MD Pulmonary Disease  11/6/23     Phone: [phone] Fax: [phone]         [address] [facility] [address]    Minda Smith, [facility] Assigned MTM Pharmacist   11/11/23     Phone: [phone] Fax: [phone] [address], [facility] [address] [facility] [address]    Minda Smith RP Pharmacist Pharmacist  11/17/23     Phone: [phone] Fax: [phone] [address] [address]    Marivel Walter PA-C Physician Assistant Dermatology  12/14/23     Phone:  [phone] Fax: [phone]         [address] [facility] [address],  [facility] [address]    Chantel Montgomery LICSW Lead Care Coordinator  Admissions 12/28/23     Phone: [phone]         Alecia Hanley CHW Community Health Worker  Admissions 1/31/24     Phone: [phone]         Dhruv Corea MD Assigned Pulmonology Provider   2/23/24     Phone: [phone] Fax: [phone]         [address] [facility] [address]                My Care Plans  Self Management and Treatment Plan    Care Plan  Care Plan: Community Resources       Problem: Lack of transportation               Problem: Unable to prepare meals               Problem: Reliable food source               Problem: Insufficient In-home support       Goal: Establish adequate home support       Start Date: 1/10/2024 Expected End Date: 7/10/2024    This Visit's Progress: 10%    Note:     Barriers: Mobility Issues  Strengths: Connected with Care Coordination  Patient expressed understanding of goal: Yes  Action steps to achieve this goal:  1. I will review housekeeping resource sent by [facility]  2. I will connect with this resource if I feel it will be helpful  3. I will remain in communication with Care Coordination about barriers I encounter or additional resources needed                             Care Plan: General       Problem: HP GENERAL PROBLEM       Goal: Obtain Replacement CPAP       Start Date: 1/10/2024 Expected End Date: 7/10/2024    This Visit's Progress: 10%    Note:     Barriers: Misplaced CPAP  Strengths: Connected with Care Coordination  Patient expressed understanding of goal: yes  Action steps to achieve this goal:  1. I will contact [facility] [employer] service to request a replacement machine  2. I will remain in communication with Care Coordination about additional resources needed or barriers I encounter                                Action Plans on File:                       Advance Care Plans/Directives:   Advanced Care  Plan/Directives on file: No data recorded  Discussed with patient/caregiver(s): No data recorded           My Medical and Care Information  Problem List   Patient Active Problem List   Diagnosis    Low back pain    Hyperlipidemia    Hypertension goal BP (blood pressure) < 140/90    Type 2 diabetes mellitus (H)    Advanced directives, counseling/discussion    Migraine headache    History of diverticulitis    MENTAL HEALTH    Generalized abdominal pain    Light chain (AL) amyloidosis (H)    Insomnia due to medical condition    Obstructive sleep apnea syndrome    Restless legs syndrome (RLS)    Leg edema    Morbid obesity (H)    Venous stasis dermatitis of both lower extremities    Polyneuropathy, unspecified    Acquired lymphedema    Low blood pressure reading    History of peripheral stem cell transplant (H)    Other specified anxiety disorders    Abnormal electrocardiography    Bilateral leg edema    Onychomycosis    Thrombocytopenia, unspecified (H24)    Bilateral leg weakness    NSTEMI (non-ST elevated myocardial infarction) (H)    Essential hypertension    Peripheral vascular disease (H24)    Other cirrhosis of liver (H)    Lymphedema    Cellulitis of right lower extremity    Right upper quadrant pain    Abnormal computed tomography scan    Acquired pancytopenia (H)    Benign neoplasm of cecum    Benign neoplasm of transverse colon    Benign prostatic hyperplasia    Bilateral cataracts    Bipolar 1 disorder, manic, mild (H)    Cannabis dependence in remission (H)    Cardiovascular stress test abnormal    Chronic fatigue, unspecified    Continuous opioid dependence (H)    Contusion of rib    Coronary arteriosclerosis    DDD (degenerative disc disease), lumbar    Decreased testosterone level    Depressed bipolar I disorder in full remission (H24)    Chronic diarrhea    Hernia of anterior abdominal wall    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)    Guillain-Sarona syndrome (H24)    History of  anaphylaxis due to severe acute respiratory syndrome coronavirus 2 (SARS-CoV-2) vaccine    History of colonic polyps    Hypertensive renal disease    Hypocalcemia    Immunoglobulin deficiency (H24)    Localized enlarged lymph nodes    Nonalcoholic steatohepatitis    Noninfectious gastroenteritis    Other muscle spasm    Polyp of colon    Presence of implanted infusion pump    Retention of urine    Spinal stenosis at L4-L5 level    Chronic pain    Cerebral infarction, unspecified (H)    Abdominal pain, generalized    Nausea and vomiting, unspecified vomiting type    Cellulitis of lower extremity, unspecified laterality    Pneumonia of left upper lobe due to infectious organism      Current Medications and Allergies:  See printed Medication Report.    Care Coordination Start Date: 12/28/2023   Frequency of Care Coordination: monthly, more frequently as needed     Form Last Updated: 03/13/2024

## 2024-03-13 NOTE — PROGRESS NOTES
Clinic Care Coordination Contact  Care Coordination Clinician Chart Review    Situation: Patient chart reviewed by Care Coordinator.       Background: Care Coordination Program started: 12/28/2023. Initial assessment completed and patient-centered care plan(s) were developed with participation from patient. Lead CC handed patient off to CHW for continued outreaches.       Assessment: Per chart review, patient outreach completed by CC CHW on 2/21.  Patient is actively working to accomplish goal(s). Patient's goal(s) appropriate and relevant at this time. Patient is due for updated Plan of Care.  Assessments will be completed annually or as needed/with change of patient status.      Care Plan: Community Resources       Problem: Lack of transportation               Problem: Unable to prepare meals               Problem: Reliable food source               Problem: Insufficient In-home support       Goal: Establish adequate home support       Start Date: 1/10/2024 Expected End Date: 7/10/2024    This Visit's Progress: 10%    Note:     Barriers: Mobility Issues  Strengths: Connected with Care Coordination  Patient expressed understanding of goal: Yes  Action steps to achieve this goal:  1. I will review housekeeping resource sent by [facility]  2. I will connect with this resource if I feel it will be helpful  3. I will remain in communication with Care Coordination about barriers I encounter or additional resources needed                             Care Plan: General       Problem: HP GENERAL PROBLEM       Goal: Obtain Replacement CPAP       Start Date: 1/10/2024 Expected End Date: 7/10/2024    This Visit's Progress: 10%    Note:     Barriers: Misplaced CPAP  Strengths: Connected with Care Coordination  Patient expressed understanding of goal: yes  Action steps to achieve this goal:  1. [facility] to request a replacement machine  2. I will remain in communication with Care Coordination about  additional resources needed or barriers I encounter                                   Plan/Recommendations: The patient will continue working with Care Coordination to achieve goal(s) as above. CHW will continue outreaches at minimum every 30 days and will involve Lead CC as needed or if patient is ready to move to Maintenance. Lead CC will continue to monitor CHW outreaches and patient's progress to goal(s) every 6 weeks.     Plan of Care updated and sent to patient: Yes, via [employer]    Chantel Montgomery [facility]  Phone: [phone]  E-mail: [email or web address]

## 2024-03-21 ENCOUNTER — VIRTUAL VISIT (OUTPATIENT)
Dept: PHARMACY | Facility: CLINIC | Age: 69
End: 2024-03-21
Payer: COMMERCIAL

## 2024-03-21 ENCOUNTER — LAB (OUTPATIENT)
Dept: LAB | Facility: CLINIC | Age: 69
End: 2024-03-21
Payer: COMMERCIAL

## 2024-03-21 DIAGNOSIS — E78.5 HYPERLIPIDEMIA LDL GOAL <100: ICD-10-CM

## 2024-03-21 DIAGNOSIS — I10 HYPERTENSION GOAL BP (BLOOD PRESSURE) < 140/90: ICD-10-CM

## 2024-03-21 DIAGNOSIS — E11.69 TYPE 2 DIABETES MELLITUS WITH OTHER SPECIFIED COMPLICATION, WITHOUT LONG-TERM CURRENT USE OF INSULIN (H): ICD-10-CM

## 2024-03-21 DIAGNOSIS — E11.65 TYPE 2 DIABETES MELLITUS WITH HYPERGLYCEMIA, WITH LONG-TERM CURRENT USE OF INSULIN (H): Primary | ICD-10-CM

## 2024-03-21 DIAGNOSIS — F31.11 BIPOLAR 1 DISORDER, MANIC, MILD (H): ICD-10-CM

## 2024-03-21 DIAGNOSIS — L40.9 PSORIASIS: ICD-10-CM

## 2024-03-21 DIAGNOSIS — Z79.4 TYPE 2 DIABETES MELLITUS WITH HYPERGLYCEMIA, WITH LONG-TERM CURRENT USE OF INSULIN (H): Primary | ICD-10-CM

## 2024-03-21 LAB
ALBUMIN SERPL BCG-MCNC: 4.1 G/DL (ref 3.5–5.2)
ALP SERPL-CCNC: 124 U/L (ref 40–150)
ALT SERPL W P-5'-P-CCNC: 42 U/L (ref 0–70)
ANION GAP SERPL CALCULATED.3IONS-SCNC: 13 MMOL/L (ref 7–15)
AST SERPL W P-5'-P-CCNC: 36 U/L (ref 0–45)
BILIRUB SERPL-MCNC: 0.6 MG/DL
BUN SERPL-MCNC: 18 MG/DL (ref 8–23)
CALCIUM SERPL-MCNC: 9.4 MG/DL (ref 8.8–10.2)
CHLORIDE SERPL-SCNC: 94 MMOL/L (ref 98–107)
CHOLEST SERPL-MCNC: 139 MG/DL
CREAT SERPL-MCNC: 1.18 MG/DL (ref 0.67–1.17)
CREAT UR-MCNC: 19.3 MG/DL
DEPRECATED HCO3 PLAS-SCNC: 26 MMOL/L (ref 22–29)
EGFRCR SERPLBLD CKD-EPI 2021: 68 ML/MIN/1.73M2
FASTING STATUS PATIENT QL REPORTED: NO
GLUCOSE SERPL-MCNC: 507 MG/DL (ref 70–99)
HBA1C MFR BLD: 10.9 % (ref 0–5.6)
HDLC SERPL-MCNC: 23 MG/DL
LDLC SERPL CALC-MCNC: ABNORMAL MG/DL
MICROALBUMIN UR-MCNC: 12.3 MG/L
MICROALBUMIN/CREAT UR: 63.73 MG/G CR (ref 0–17)
NONHDLC SERPL-MCNC: 116 MG/DL
POTASSIUM SERPL-SCNC: 4.4 MMOL/L (ref 3.4–5.3)
PROT SERPL-MCNC: 6.3 G/DL (ref 6.4–8.3)
SODIUM SERPL-SCNC: 133 MMOL/L (ref 135–145)
TRIGL SERPL-MCNC: 602 MG/DL

## 2024-03-21 PROCEDURE — 82043 UR ALBUMIN QUANTITATIVE: CPT

## 2024-03-21 PROCEDURE — 99207 PR NO CHARGE LOS: CPT | Mod: 95 | Performed by: PHARMACIST

## 2024-03-21 PROCEDURE — 80061 LIPID PANEL: CPT

## 2024-03-21 PROCEDURE — 83036 HEMOGLOBIN GLYCOSYLATED A1C: CPT

## 2024-03-21 PROCEDURE — 80053 COMPREHEN METABOLIC PANEL: CPT

## 2024-03-21 PROCEDURE — 36415 COLL VENOUS BLD VENIPUNCTURE: CPT

## 2024-03-21 PROCEDURE — 82570 ASSAY OF URINE CREATININE: CPT

## 2024-03-21 RX ORDER — BLOOD-GLUCOSE SENSOR
1 EACH MISCELLANEOUS ONCE
Qty: 2 EACH | Refills: 3 | Status: SHIPPED | OUTPATIENT
Start: 2024-03-21 | End: 2024-03-21

## 2024-03-21 RX ORDER — KETOROLAC TROMETHAMINE 30 MG/ML
1 INJECTION, SOLUTION INTRAMUSCULAR; INTRAVENOUS
Qty: 1 EACH | Refills: 0 | Status: SHIPPED | OUTPATIENT
Start: 2024-03-21 | End: 2024-05-29

## 2024-03-21 NOTE — PROGRESS NOTES
Medication Therapy Management (MTM) Encounter    ASSESSMENT:                            Medication Adherence/Access: See below for considerations    Diabetes   Due for updated labs. Last A1c was not at goal, and he's been out of meds for a while. Would benefit from restarting. Given his last UACR and A1c he may benefit from adding SGLT2i to his regimen, however, will hold on this in favor of restarting meds he has. Would benefit from resuming use of Gabriel CGM  Eye/foot exam UTD.     Bipolar disorder:   Improving - has all of his meds now, will monitor as he restarts.     Psoriasis:   Improving, monitor as he just restarted meds.     Hypertension   Home BP is not at goal. Would benefit from renal labs and then restarting hydrochlorothiazide if safe.     PLAN:                             Ozempic at the clinic. Start taking Ozempic 0.25mg weekly for four weeks then increase to Ozempic 0.5mg weekly.   When you are here at clinic, get your labs done.   Check your BP once daily for the next week, please send the readings to Minda SPRINGER sent in new prescriptions for the Freestyle Gabriel 3    Follow-up: Upon return of lab results    SUBJECTIVE/OBJECTIVE:                          Erickson Hernández is a 68 year old male contacted via secure video for a follow-up visit.     Reason for visit: med review.    Allergies/ADRs: Reviewed in chart  Past Medical History: Reviewed in chart  Tobacco: He reports that he has never smoked. He has never used smokeless tobacco.  Alcohol: 4-6 beverages / week    Medication Adherence/Access: Out of Ozempic, see below. Refill requested for hydrochlorothiazide, but this was stopped at 9/27/23 hospital discharge.     Diabetes   Lantus 50 units once daily  Metformin  mg two tablets twice daily with meals   Ozempic is in clinic from patient assistance, he is unaware of this. Has not had Ozempic in over a month.   Aspirin 81mg daily    Blood sugar monitoring: Not testing at all. He does not  have CGM right now (for unclear reason)     Eye exam is up to date  Foot exam is up to date    Bipolar disorder:   Bupropion  mg daily   Duloxetine 30 mg twice daily - received from patient assistance program.   Vraylar 4.5mg daily - Received 6 bottles yesterday from PAP  Depression sx have been up and down as he has been on/off meds for cost reasons.   No side effects reported     Psoriasis:   Skyrizi - Recently restarted, getting through patient assistance.   Has seen good improvement in plaques - about half of his plaques are resolving.   reports that he will be receiving via the medication from the  and will restart at that time.   Reports that the stinging/burning of his skin has been much more tolerable lately.    Hypertension   Metoprolol ER 100mg daily  Losartan 100mg daily  Fuorsemide 20mg once daily  Patient reports no current medication side effects.  Home -150/85's.      ----------------  I spent 18 minutes with this patient today. All changes were made via collaborative practice agreement with Sherwin Mcdermott DO. A copy of the visit note was provided to the patient's provider(s).    A summary of these recommendations was sent via [employer].    Gabino VizcainoD, TOSHIA, BCACP  MTM Pharmacist, [facility]     Telemedicine Visit Details  Type of service:  Video Conference via [employer]  Joined the call at 3/21/2024, 9:39:38 am.  Left the call at 3/21/2024, 9:57:59 am.  You were on the call for 18 minutes 20 seconds .     Medication Therapy Recommendations  No medication therapy recommendations to display

## 2024-03-22 NOTE — PATIENT INSTRUCTIONS
"Recommendations from today's MTM visit:                                                    MTM (medication therapy management) is a service provided by a clinical pharmacist designed to help you get the most of out of your medicines.   Today we reviewed what your medicines are for, how to know if they are working, that your medicines are safe and how to make your medicine regimen as easy as possible.       Ozempic at the clinic. Start taking Ozempic 0.25mg weekly for four weeks then increase to Ozempic 0.5mg weekly.   When you are here at clinic, get your labs done.   Check your BP once daily for the next week, please send the readings to [facility]    Follow-up: Upon return of lab results    It was great speaking with you today.  I value your experience and would be very thankful for your time in providing feedback in our clinic survey. In the next few days, you may receive an email or text message from [employer] with a link to a survey related to your  clinical pharmacist.\"     To schedule another MTM appointment, please call the clinic directly or you may call the MTM scheduling line at [phone] or toll-free at [phone].     My Clinical Pharmacist's contact information:                                                      Please feel free to contact me with any questions or concerns you have.      Minda Smith, Pharm.D., M.B.A., [facility]  MTM Pharmacist, [facility]  Pager: [phone]  Email: [email or web address]      "

## 2024-03-27 ENCOUNTER — PATIENT OUTREACH (OUTPATIENT)
Dept: CARE COORDINATION | Facility: CLINIC | Age: 69
End: 2024-03-27
Payer: COMMERCIAL

## 2024-03-27 NOTE — LETTER
[facility] CARE COORDINATION  [address]  [facility] [address]    March 28, 2024    Parmjti RONAL Hernández  [address]  [facility] [address]      Hello     Thank you for taking the time to speak with me yesterday. Below I have included some more housekeeping resources for you to review and connect with to determine if they meet your needs:        Senior Linkage Line  [place]://[place]senior[place]linkage[place]line/  752.319.7351  [facility] Linkage Line is a great resource for Seniors in [place]! As part of their support services, they would have resources for homemaker/chore services to help with housekeeping and preparing meals, or chores like mowing the lawn and shoveling snow.    [employer]  https://www.[employer].com/our-services/homemaking/  7-867-656-4291  The care of your home and your personal comfort is extremely important to us. We want to be sure your life is as stress-free as possible. Keeping your home looking beautiful at all hours will help alleviate your anxiety and tension. We will always be sure to remove any clutter and help you to organize your belongings in an easy-to-find system so everything has a place and you always know right where to find things.    Sen[employer] Helping Seniors  [email or web address]/care-services-[email or web address].html#light-housekeeping  100-949-8925  When tidying up becomes too much of a taxing chore, our caregivers can step in and manage the upkeep. They can assist with tasks, such as laundry, folding linens, light cleaning, dusting, sweeping, vacuuming and more. Our caregivers make it easy to keep your house feeling like home.      If you have any further questions, do not hesitate to contact me.    Alecia Hanley  Community Health Worker  [facility]  [phone]

## 2024-03-27 NOTE — PROGRESS NOTES
Clinic Care Coordination Contact  Community Health Worker Follow Up    Care Gaps: Discussed need for Medicare Annual Wellness Visit. CHW was able to check appt desk to see pt have a preventative adult visit with Dr. Radha Mcdermott on 4/2/24, where HM can be discussed.    Health Maintenance Due   Topic Date Due    CONTROLLED SUBSTANCE AGREEMENT FOR CHRONIC PAIN MANAGEMENT  Never done    RSV VACCINE (Pregnancy & 60+) (1 - 1-dose 60+ series) Never done    IPV IMMUNIZATION (3 of 3 - Adult catch-up series) 07/22/2020    COLORECTAL CANCER SCREENING  08/01/2021    HEPATITIS A IMMUNIZATION (2 of 2 - Risk 2-dose series) 11/16/2022    MEDICARE ANNUAL WELLNESS VISIT  02/23/2024    FALL RISK ASSESSMENT  03/03/2024    URINE DRUG SCREEN  04/17/2024       Care Plan:   Care Plan: Community Resources       Problem: Lack of transportation               Problem: Unable to prepare meals               Problem: Reliable food source               Problem: Insufficient In-home support       Goal: Establish adequate home support       Start Date: 1/10/2024 Expected End Date: 7/10/2024    This Visit's Progress: 20% Recent Progress: 10%    Note:     Barriers: Mobility Issues  Strengths: Connected with Care Coordination  Patient expressed understanding of goal: Yes  Action steps to achieve this goal:  1. I will review housekeeping resource sent by [facility]  2. I will connect with this resource if I feel it will be helpful  3. I will remain in communication with Care Coordination about barriers I encounter or additional resources needed                             Care Plan: General       Problem: HP GENERAL PROBLEM       Goal: Obtain Replacement CPAP       Start Date: 1/10/2024 Expected End Date: 7/10/2024    This Visit's Progress: 10% Recent Progress: 10%    Note:     Barriers: Misplaced CPAP  Strengths: Connected with Care Coordination  Patient expressed understanding of goal: yes  Action steps to achieve this goal:  1. I will contact [facility]  "customer service to request a replacement machine  2. I will remain in communication with Care Coordination about additional resources needed or barriers I encounter                                Intervention and Education during outreach:   Spoke to pt this afternoon:  \"Things are going okay\". Pt states his back surgery scheduled for March or April was cancelled. He cannot remember specifics of why it was cancelled.   House Cleaning chores - pt needs assistance with house cleaning which is affordable. He did call [employer], but it was unaffordable to him. CHW will send more housecleaning resources to pt's [employer] account today, including Senior Linkage Line and Senior Community Services  Pt is aware of 5/20/24 colonoscopy - pt does have transportation to/from the procedure lined up already; friend who can stay with him following the procedure.  CPAP - pt has not had the time to call [employer] customer service to check on getting a replacement unit. Pt still has the contact information for [employer] from [employer], sent to [employer] 1/10/24.  CHW asks if pt has any further concerns or need for resources as this time. Pt states he does not. CHW made sure pt has CHW contact information. Pt will contact CHW with questions or updates before next outreach.     CHW Plan: CHW will send more housecleaning resources to pt's [employer] account today. CHW will follow up with pt in one month.    Alecia Hanley  Community Health Worker  [facility]  [phone]            "

## 2024-03-28 ENCOUNTER — TELEPHONE (OUTPATIENT)
Dept: FAMILY MEDICINE | Facility: CLINIC | Age: 69
End: 2024-03-28
Payer: COMMERCIAL

## 2024-03-28 NOTE — TELEPHONE ENCOUNTER
Hello,     Patient has insurance through [employer] and test claim states the Freestyle Gabriel 3 is going through as a transitional fill. Please start prior authorization through new insurance listed below. Feel free to contact us with any questions.     Thank you    Diabetes [facility] Pharmacy Team  [facility]  Phone: [phone]  Fax: [phone]  Pool: Pharm Diabetes

## 2024-04-02 ENCOUNTER — OFFICE VISIT (OUTPATIENT)
Dept: FAMILY MEDICINE | Facility: CLINIC | Age: 69
End: 2024-04-02
Payer: COMMERCIAL

## 2024-04-02 ENCOUNTER — TELEPHONE (OUTPATIENT)
Dept: WOUND CARE | Facility: CLINIC | Age: 69
End: 2024-04-02

## 2024-04-02 VITALS
WEIGHT: 266.3 LBS | RESPIRATION RATE: 20 BRPM | HEART RATE: 82 BPM | SYSTOLIC BLOOD PRESSURE: 94 MMHG | HEIGHT: 66 IN | OXYGEN SATURATION: 92 % | DIASTOLIC BLOOD PRESSURE: 60 MMHG | BODY MASS INDEX: 42.8 KG/M2 | TEMPERATURE: 97.7 F

## 2024-04-02 DIAGNOSIS — F11.20 CONTINUOUS OPIOID DEPENDENCE (H): ICD-10-CM

## 2024-04-02 DIAGNOSIS — G89.4 CHRONIC PAIN SYNDROME: ICD-10-CM

## 2024-04-02 DIAGNOSIS — Z23 NEED FOR PROPHYLACTIC VACCINATION AGAINST HEPATITIS A: ICD-10-CM

## 2024-04-02 DIAGNOSIS — E66.01 MORBID OBESITY (H): ICD-10-CM

## 2024-04-02 DIAGNOSIS — I10 HYPERTENSION GOAL BP (BLOOD PRESSURE) < 140/90: ICD-10-CM

## 2024-04-02 DIAGNOSIS — E85.81 LIGHT CHAIN (AL) AMYLOIDOSIS (H): ICD-10-CM

## 2024-04-02 DIAGNOSIS — L40.9 PSORIASIS: ICD-10-CM

## 2024-04-02 DIAGNOSIS — E11.69 TYPE 2 DIABETES MELLITUS WITH OTHER SPECIFIED COMPLICATION, WITHOUT LONG-TERM CURRENT USE OF INSULIN (H): ICD-10-CM

## 2024-04-02 DIAGNOSIS — I21.4 NSTEMI (NON-ST ELEVATED MYOCARDIAL INFARCTION) (H): ICD-10-CM

## 2024-04-02 DIAGNOSIS — Z29.11 NEED FOR VACCINATION AGAINST RESPIRATORY SYNCYTIAL VIRUS: ICD-10-CM

## 2024-04-02 DIAGNOSIS — K74.69 OTHER CIRRHOSIS OF LIVER (H): ICD-10-CM

## 2024-04-02 DIAGNOSIS — Z00.00 ENCOUNTER FOR MEDICARE ANNUAL WELLNESS EXAM: Primary | ICD-10-CM

## 2024-04-02 DIAGNOSIS — R60.0 BILATERAL LOWER EXTREMITY EDEMA: ICD-10-CM

## 2024-04-02 DIAGNOSIS — S91.109A WOUND, OPEN, TOE WITH COMPLICATION, INITIAL ENCOUNTER: ICD-10-CM

## 2024-04-02 DIAGNOSIS — F31.76 DEPRESSED BIPOLAR I DISORDER IN FULL REMISSION (H): ICD-10-CM

## 2024-04-02 DIAGNOSIS — G47.33 OBSTRUCTIVE SLEEP APNEA SYNDROME: ICD-10-CM

## 2024-04-02 DIAGNOSIS — E78.5 HYPERLIPIDEMIA LDL GOAL <100: ICD-10-CM

## 2024-04-02 DIAGNOSIS — I73.9 PERIPHERAL VASCULAR DISEASE (H): ICD-10-CM

## 2024-04-02 PROBLEM — G61.0 GUILLAIN-BARRE SYNDROME (H): Status: RESOLVED | Noted: 2020-11-07 | Resolved: 2024-04-02

## 2024-04-02 PROBLEM — J18.9 PNEUMONIA OF LEFT UPPER LOBE DUE TO INFECTIOUS ORGANISM: Status: RESOLVED | Noted: 2023-12-24 | Resolved: 2024-04-02

## 2024-04-02 PROBLEM — F31.11 BIPOLAR 1 DISORDER, MANIC, MILD (H): Status: RESOLVED | Noted: 2022-11-29 | Resolved: 2024-04-02

## 2024-04-02 PROCEDURE — G0439 PPPS, SUBSEQ VISIT: HCPCS | Performed by: FAMILY MEDICINE

## 2024-04-02 PROCEDURE — 90471 IMMUNIZATION ADMIN: CPT | Mod: GZ | Performed by: FAMILY MEDICINE

## 2024-04-02 PROCEDURE — 90713 POLIOVIRUS IPV SC/IM: CPT | Mod: GZ | Performed by: FAMILY MEDICINE

## 2024-04-02 RX ORDER — RESPIRATORY SYNCYTIAL VIRUS VACCINE 120MCG/0.5
0.5 KIT INTRAMUSCULAR ONCE
Qty: 1 EACH | Refills: 0 | Status: SHIPPED | OUTPATIENT
Start: 2024-04-02 | End: 2024-04-02

## 2024-04-02 SDOH — HEALTH STABILITY: PHYSICAL HEALTH: ON AVERAGE, HOW MANY MINUTES DO YOU ENGAGE IN EXERCISE AT THIS LEVEL?: 0 MIN

## 2024-04-02 SDOH — HEALTH STABILITY: PHYSICAL HEALTH: ON AVERAGE, HOW MANY DAYS PER WEEK DO YOU ENGAGE IN MODERATE TO STRENUOUS EXERCISE (LIKE A BRISK WALK)?: 0 DAYS

## 2024-04-02 ASSESSMENT — ANXIETY QUESTIONNAIRES
GAD7 TOTAL SCORE: 7
7. FEELING AFRAID AS IF SOMETHING AWFUL MIGHT HAPPEN: SEVERAL DAYS
7. FEELING AFRAID AS IF SOMETHING AWFUL MIGHT HAPPEN: SEVERAL DAYS
3. WORRYING TOO MUCH ABOUT DIFFERENT THINGS: MORE THAN HALF THE DAYS
4. TROUBLE RELAXING: SEVERAL DAYS
GAD7 TOTAL SCORE: 7
2. NOT BEING ABLE TO STOP OR CONTROL WORRYING: SEVERAL DAYS
GAD7 TOTAL SCORE: 7
8. IF YOU CHECKED OFF ANY PROBLEMS, HOW DIFFICULT HAVE THESE MADE IT FOR YOU TO DO YOUR WORK, TAKE CARE OF THINGS AT HOME, OR GET ALONG WITH OTHER PEOPLE?: SOMEWHAT DIFFICULT
6. BECOMING EASILY ANNOYED OR IRRITABLE: SEVERAL DAYS
IF YOU CHECKED OFF ANY PROBLEMS ON THIS QUESTIONNAIRE, HOW DIFFICULT HAVE THESE PROBLEMS MADE IT FOR YOU TO DO YOUR WORK, TAKE CARE OF THINGS AT HOME, OR GET ALONG WITH OTHER PEOPLE: SOMEWHAT DIFFICULT
5. BEING SO RESTLESS THAT IT IS HARD TO SIT STILL: NOT AT ALL
1. FEELING NERVOUS, ANXIOUS, OR ON EDGE: SEVERAL DAYS

## 2024-04-02 ASSESSMENT — SOCIAL DETERMINANTS OF HEALTH (SDOH): HOW OFTEN DO YOU GET TOGETHER WITH FRIENDS OR RELATIVES?: MORE THAN THREE TIMES A WEEK

## 2024-04-02 ASSESSMENT — PATIENT HEALTH QUESTIONNAIRE - PHQ9
10. IF YOU CHECKED OFF ANY PROBLEMS, HOW DIFFICULT HAVE THESE PROBLEMS MADE IT FOR YOU TO DO YOUR WORK, TAKE CARE OF THINGS AT HOME, OR GET ALONG WITH OTHER PEOPLE: SOMEWHAT DIFFICULT
SUM OF ALL RESPONSES TO PHQ QUESTIONS 1-9: 8
SUM OF ALL RESPONSES TO PHQ QUESTIONS 1-9: 8

## 2024-04-02 ASSESSMENT — PAIN SCALES - GENERAL: PAINLEVEL: MODERATE PAIN (4)

## 2024-04-02 NOTE — PROGRESS NOTES
Preventive Care Visit  [facility]  Sherwin Mcdermott DO, Family Medicine  Apr 2, 2024      Assessment & Plan     Encounter for Medicare annual wellness exam  His chronic medical conditions appear to be stable today.  However, he continues to struggle with the wound in the left great toe region.  I put in a new referral for him to reestablish at the wound care clinic.  It sounds like his blood glucose levels are improving since increasing the Lantus and starting Ozempic.  He is still titrating up on the dose and he reports that he is going to be getting a continuous glucose monitor soon.  He will continue to follow closely with [facility] pharmacy.    Morbid obesity (H)  He is trying to work on lifestyle modifications and he recently started Ozempic which will hopefully help with weight loss.    Type 2 diabetes mellitus with other specified complication, without long-term current use of insulin (H)  On 3/21/2024 his A1c was high at 10.9%.  He recently started Ozempic and feels like his blood glucose numbers are coming down.    Wound, open, toe with complication, initial encounter  - Wound Care Referral; Future    Bilateral lower extremity edema  Continue furosemide 20 mg daily.    Depressed bipolar I disorder in full remission (H24)  Continue Wellbutrin and Cymbalta.    Hypertension goal BP (blood pressure) < 140/90  Blood pressure is stable on losartan and metoprolol.    Hyperlipidemia LDL goal <100  Currently on atorvastatin.  His recent triglycerides were extremely high at 602.  Hopefully this will improve as the glucose numbers come down.    Psoriasis  Currently on Skyrizi and following with dermatology.    Obstructive sleep apnea syndrome  He has been off CPAP.    Chronic pain syndrome  Continue following with the pain clinic and continue current medications through them.    Continuous opioid dependence (H)  Stable and following with the pain clinic.    Peripheral vascular disease (H24)  Was  "given a referral to reestablish care at the wound clinic.    Other cirrhosis of liver (H)  This issue has been stable.    NSTEMI (non-ST elevated myocardial infarction) (H)  Stable on current medications.    Light chain (AL) amyloidosis (H)  This issue has been stable.    Need for prophylactic vaccination against hepatitis A  - hepatitis A vaccine (VAQTA) 50 UNIT/ML injection; Inject 1 mL (50 Units) into the muscle once for 1 dose    Need for vaccination against respiratory syncytial virus  - respiratory syncytial virus vaccine, bivalent (ABRYSVO) injection; Inject 0.5 mLs into the muscle once for 1 dose      Patient has been advised of split billing requirements and indicates understanding: Yes          BMI  Estimated body mass index is 42.98 kg/m  as calculated from the following:    Height as of this encounter: 1.676 m (5' 6\").    Weight as of this encounter: 120.8 kg (266 lb 4.8 oz).   Weight management plan: Discussed healthy diet and exercise guidelines    Counseling  Appropriate preventive services were discussed with this patient, including applicable screening as appropriate for fall prevention, nutrition, physical activity, Tobacco-use cessation, weight loss and cognition.  Checklist reviewing preventive services available has been given to the patient.  Reviewed patient's diet, addressing concerns and/or questions.   Discussed possible causes of fatigue. Updated plan of care.  Patient reported difficulty with activities of daily living were addressed today.The patient's PHQ-9 score is consistent with mild depression. He was provided with information regarding depression.   I have reviewed Opioid Use Disorder and Substance Use Disorder risk factors and made any needed referrals.           Lavinia Almendarez is a 68 year old, presenting for the following:  Physical (AWV)        4/2/2024    10:07 AM   Additional Questions   Roomed by SERGEY Olivarez   Accompanied by N/A         Health Care Directive  Patient does " not have a Health Care Directive or Living Will: Patient states has Advance Directive and will bring in a copy to clinic.    [facility]    Wellness Visit Notes:  -Last colon cancer screening done 8/2018, due today, order previously placed for colonoscopy referral by Neyda Robbins RN on 11/2023 (impression: colorectal polyps per chart review) Pt reports he is currently [facility] with [facility] to schedule a colonoscopy  -Last PSA done 1/2019 (impression: 0.19) Pt requesting to complete PSA lab today  -Immunizations: RSV/Hepatitis A - Pt verbalized will receive at pharmacy, IPV - provider to review    He has a complex medical history that includes peripheral vascular disease, peripheral neuropathy, coronary artery disease, history of NSTEMI with stent placement, amyloidosis, anxiety, depression, psoriasis, bipolar 1 disorder, hypertension, diabetes mellitus type 2, hyperlipidemia, MEJIAS with liver cirrhosis, plaque psoriasis, restless leg syndrome, polyneuropathy, obstructive sleep apnea not using CPAP not currently using CPAP, chronic pain with pain pump containing morphine and fentanyl.     On 3/21/2024 his hemoglobin A1c was 10.9%, creatinine 1.18, glucose 507, triglycerides 602 and HDL 23.  Since that time he has increased the Lantus to 50 units daily and has been able to start Ozempic.  He is also on metformin XR 1000 mg twice daily.  He reports that he is going to be getting a continuous glucose monitor soon.  When he has checked his blood glucose recently it was looking much better and in the mid 100s.  Overall, he feels like his chronic medical issues are stable, but he is still struggling with an wound on the left great toe and would like a referral to go back to the wound clinic.  He is also following closely with dermatology and has had at least 1 dose of Skyrizi recently which he thinks is helping.              4/2/2024   General Health   How would you rate your overall physical health? (!) POOR   Feel stress (tense,  anxious, or unable to sleep) To some extent   (!) STRESS CONCERN      4/2/2024   Nutrition   Diet: Diabetic         4/2/2024   Exercise   Days per week of moderate/strenous exercise 0 days   Average minutes spent exercising at this level 0 min   (!) EXERCISE CONCERN      4/2/2024   Social Factors   Frequency of gathering with friends or relatives More than three times a week   Worry food won't last until get money to buy more No   Food not last or not have enough money for food? No   Do you have housing?  Yes   Are you worried about losing your housing? Yes   Lack of transportation? No   Unable to get utilities (heat,electricity)? No   Want help with housing or utility concern? (!) YES   (!) HOUSING CONCERN PRESENT      4/2/2024   Activities of Daily Living- Home Safety   Needs help with the following daily activites Housework   Safety concerns in the home None of the above         4/2/2024   Dental   Dentist two times every year? Yes         4/2/2024   Hearing Screening   Hearing concerns? None of the above         4/2/2024   Driving Risk Screening   Patient/family members have concerns about driving No         4/2/2024   General Alertness/Fatigue Screening   Have you been more tired than usual lately? (!) YES         4/2/2024   Urinary Incontinence Screening   Bothered by leaking urine in past 6 months No         4/2/2024   TB Screening   Were you born outside of the [place]? No       Today's PHQ-9 Score:       4/2/2024     9:52 AM   PHQ-9 SCORE   PHQ-9 Total Score MyChart 8 (Mild depression)   PHQ-9 Total Score 8         4/2/2024   Substance Use   Alcohol more than 3/day or more than 7/wk No   Do you have a current opioid prescription? (!) YES   How severe/bad is pain from 1 to 10? 6/10   Do you use any other substances recreationally? No          No data to display              Low Risk (0-3)  Moderate Risk (4-7)  High Risk (>8)  Social History     Tobacco Use    Smoking status: Never    Smokeless tobacco: Never    Vaping Use    Vaping Use: Never used   Substance Use Topics    Alcohol use: Not Currently     Alcohol/week: 0.0 standard drinks of alcohol    Drug use: Not Currently     Types: Cocaine, Marijuana, Methamphetamines, Opiates, IV, Amphetamines, Barbiturates, Benzodiazepines, Codeine, Fentanyl, Hashish, Hydrocodone, Hydromorphone, LSD, Oxycodone           4/2/2024   AAA Screening   Family history of Abdominal Aortic Aneurysm (AAA)? No   Last PSA:   PSA   Date Value Ref Range Status   01/09/2019 0.19 0 - 4 ug/L Final     Comment:     Assay Method:  Chemiluminescence using Siemens Vista analyzer     ASCVD Risk   The ASCVD Risk score (Pauline DK, et al., 2019) failed to calculate for the following reasons:    The patient has a prior MI or stroke diagnosis            Reviewed and updated as needed this visit by Provider                      Current providers sharing in care for this patient include:  Patient Care Team:  Sherwin Mejia DO as PCP - General (Family Medicine)  Kuldeep Borrero MD as MD (Gastroenterology)  Sherwin Mejia DO as Assigned PCP  Lisseth Arana DPM as Assigned Surgical Provider  Dylan Jose MD as MD (Neurology)  Kera Jernigan APRN CNP as Nurse Practitioner (Cardiovascular Disease)  Charly Hester MD as Assigned Cancer Care Provider  Diana Bobo, RN as Specialty Care Coordinator (Hematology & Oncology)  Rodrick Ferrer MD as Assigned Heart and Vascular Provider  Dylan Jose MD as Assigned Neuroscience Provider  Roxann Hdz [facility] as Pharmacist (Pharmacist)  Dhruv Corea MD as MD (Pulmonary Disease)  Minda Smith [facility] as Assigned MTM Pharmacist  Minda Smith [facility] as Pharmacist (Pharmacist)  Marivel Walter PA-C as Physician Assistant (Dermatology)  Chantel Montgomery LICSW as Lead Care Coordinator  Alecia Hanley CHW as Community Health Worker  Dhruv Corea MD as Assigned Pulmonology Provider    The following  "health maintenance items are reviewed in Epic and correct as of today:  Health Maintenance   Topic Date Due    CONTROLLED SUBSTANCE AGREEMENT FOR CHRONIC PAIN MANAGEMENT  Never done    RSV VACCINE (Pregnancy & 60+) (1 - 1-dose 60+ series) Never done    IPV IMMUNIZATION (3 of 3 - Adult catch-up series) 07/22/2020    COLORECTAL CANCER SCREENING  08/01/2021    HEPATITIS A IMMUNIZATION (2 of 2 - Risk 2-dose series) 11/16/2022    URINE DRUG SCREEN  04/17/2024    A1C  09/21/2024    DIABETIC FOOT EXAM  12/12/2024    ANNUAL REVIEW OF HM ORDERS  02/15/2025    BMP  03/21/2025    LIPID  03/21/2025    MICROALBUMIN  03/21/2025    MEDICARE ANNUAL WELLNESS VISIT  04/02/2025    NANCY ASSESSMENT  04/02/2025    FALL RISK ASSESSMENT  04/02/2025    PHQ-9  04/02/2025    EYE EXAM  06/15/2025    ADVANCE CARE PLANNING  02/23/2028    DTAP/TDAP/TD IMMUNIZATION (5 - Td or Tdap) 01/22/2030    HEPATITIS C SCREENING  Completed    MIGRAINE ACTION PLAN  Completed    INFLUENZA VACCINE  Completed    Pneumococcal Vaccine: 65+ Years  Completed    ZOSTER IMMUNIZATION  Completed    HEPATITIS B IMMUNIZATION  Completed    COVID-19 Vaccine  Completed    HPV IMMUNIZATION  Aged Out    MENINGITIS IMMUNIZATION  Aged Out    RSV MONOCLONAL ANTIBODY  Aged Out         Review of Systems  Constitutional, HEENT, cardiovascular, pulmonary, GI, , musculoskeletal, neuro, skin, endocrine and psych systems are negative, except as otherwise noted.     Objective    Exam  BP 94/60 (BP Location: Right arm, Patient Position: Sitting, Cuff Size: Adult Regular)   Pulse 82   Temp 97.7  F (36.5  C) (Temporal)   Resp 20   Ht 1.676 m (5' 6\")   Wt 120.8 kg (266 lb 4.8 oz)   SpO2 92%   BMI 42.98 kg/m     Estimated body mass index is 42.98 kg/m  as calculated from the following:    Height as of this encounter: 1.676 m (5' 6\").    Weight as of this encounter: 120.8 kg (266 lb 4.8 oz).    Physical Exam    GENERAL: healthy, alert and no distress  EYES: Eyes grossly normal to " inspection, PERRL and conjunctivae and sclerae normal  HENT: The throat is mildly erythematous with mucus in the posterior oropharynx.  NECK: no adenopathy, no asymmetry, masses, or scars and thyroid normal to palpation  RESP: lungs clear to auscultation - no rales, rhonchi or wheezes  CV: regular rate and rhythm, normal S1 S2, no S3 or S4, no murmur, click or rub, no peripheral edema and peripheral pulses strong  ABDOMEN: soft, nontender, no hepatosplenomegaly, no masses and bowel sounds normal  MS:   1+ bilateral lower extremity edema.  Homans' sign negative.  Gait is moderately unsteady.  There is significant callus and skin breakdown over the left great toe.  No active bleeding.  SKIN: There are several circular psoriasis lesions scattered on his arms and legs.  NEURO: Normal strength and tone, mentation intact and speech normal  PSYCH: mentation appears normal, affect normal/bright        4/2/2024   Mini Cog   Clock Draw Score 2 Normal   3 Item Recall 3 objects recalled   Mini Cog Total Score 5              Signed Electronically by: Sherwin Mcdermott, DO    Answers submitted by the patient for this visit:  Patient Health Questionnaire (Submitted on 4/2/2024)  If you checked off any problems, how difficult have these problems made it for you to do your work, take care of things at home, or get along with other people?: Somewhat difficult  PHQ9 TOTAL SCORE: 8  NANCY-7 (Submitted on 4/2/2024)  NANCY 7 TOTAL SCORE: 7

## 2024-04-02 NOTE — NURSING NOTE
Pt received IPV vaccine per provider, Dr. Garcia's, orders .  Prior to immunization administration, verified patients identity using patient s name and date of birth. Pt given VIS form prior to immunization administration.     Patient instructed to remain in clinic for 15 minutes afterwards, and to report any adverse reactions.     Performed by Sher Hdz RN on 4/2/2024 at 11:01 AM.

## 2024-04-02 NOTE — TELEPHONE ENCOUNTER
"Previous [facility] and Hien patient called to follow up on a referral and requested scheduling. Patient states that his wounds are on both calves and both shins. He describes them as \"blistering\" but also states that they open and are draining. Patient in AMB with a cane or walker.  "

## 2024-04-02 NOTE — PATIENT INSTRUCTIONS
Preventive Care Advice   This is general advice given by our system to help you stay healthy. However, your care team may have specific advice just for you. Please talk to your care team about your preventive care needs.  Nutrition  Eat 5 or more servings of fruits and vegetables each day.  Try wheat bread, brown rice and whole grain pasta (instead of white bread, rice, and pasta).  Get enough calcium and vitamin D. Check the label on foods and aim for 100% of the RDA (recommended daily allowance).  Lifestyle  Exercise at least 150 minutes each week   (30 minutes a day, 5 days a week).  Do muscle strengthening activities 2 days a week. These help control your weight and prevent disease.  No smoking.  Wear sunscreen to prevent skin cancer.  Have a dental exam and cleaning every 6 months.  Yearly exams  See your health care team every year to talk about:  Any changes in your health.  Any medicines your care team has prescribed.  Preventive care, family planning, and ways to prevent chronic diseases.  Shots (vaccines)   HPV shots (up to age 26), if you've never had them before.  Hepatitis B shots (up to age 59), if you've never had them before.  COVID-19 shot: Get this shot when it's due.  Flu shot: Get a flu shot every year.  Tetanus shot: Get a tetanus shot every 10 years.  Pneumococcal, hepatitis A, and RSV shots: Ask your care team if you need these based on your risk.  Shingles shot (for age 50 and up).  General health tests  Diabetes screening:  Starting at age 35, Get screened for diabetes at least every 3 years.  If you are younger than age 35, ask your care team if you should be screened for diabetes.  Cholesterol test: At age 39, start having a cholesterol test every 5 years, or more often if advised.  Bone density scan (DEXA): At age 50, ask your care team if you should have this scan for osteoporosis (brittle bones).  Hepatitis C: Get tested at least once in your life.  STIs (sexually transmitted  infections)  Before age 24: Ask your care team if you should be screened for STIs.  After age 24: Get screened for STIs if you're at risk. You are at risk for STIs (including HIV) if:  You are sexually active with more than one person.  You don't use condoms every time.  You or a partner was diagnosed with a sexually transmitted infection.  If you are at risk for HIV, ask about PrEP medicine to prevent HIV.  Get tested for HIV at least once in your life, whether you are at risk for HIV or not.  Cancer screening tests  Cervical cancer screening: If you have a cervix, begin getting regular cervical cancer screening tests at age 21. Most people who have regular screenings with normal results can stop after age 65. Talk about this with your provider.  Breast cancer scan (mammogram): If you've ever had breasts, begin having regular mammograms starting at age 40. This is a scan to check for breast cancer.  Colon cancer screening: It is important to start screening for colon cancer at age 45.  Have a colonoscopy test every 10 years (or more often if you're at risk) Or, ask your provider about stool tests like a FIT test every year or Cologuard test every 3 years.  To learn more about your testing options, visit: https://www.[employer]/[email or web address].pdf.  For help making a decision, visit: https://bit.ly/qk77421.  Prostate cancer screening test: If you have a prostate and are age 55 to 69, ask your provider if you would benefit from a yearly prostate cancer screening test.  Lung cancer screening: If you are a current or former smoker age 50 to 80, ask your care team if ongoing lung cancer screenings are right for you.  For informational purposes only. Not to replace the advice of your health care provider. Copyright   2023 [employer]. All rights reserved. Clinically reviewed by the [facility] Transitions Program. [employer] [id number] - REV 01/24.

## 2024-04-02 NOTE — TELEPHONE ENCOUNTER
Please schedule with Dr. Flood, Dr. Nash, or Adilia Nguyen NP at [facility] [place] for next available appointment as a RETURN patient.    **For all providers, Selam Gomes PA-C, Dr. Triana, Adilia Nguyen NP or Dr. Nash, please schedule a follow up 2-3 weeks after initial appointment.**  --If unable to schedule within 2-3 weeks then please place on cancellation list--    Is the patient able to make their own medical decisions? Yes    Can the patient be scheduled on a Thursday? Yes    Is patient a THELMA lift? PLEASE INQUIRE WHEN MAKING THE APPOINTMENT AND PUT IN APPOINTMENT NOTES    Routing to  Wound Healing Scheduling.

## 2024-04-02 NOTE — PROGRESS NOTES
Preventive Care Visit  [facility]  Sherwin Mcdermott DO, Family Medicine  Apr 2, 2024  {Provider  Link to SmartSet :737315}    {PROVIDER CHARTING PREFERENCE:945646}    Lavinia Almendarez is a 68 year old, presenting for the following:  No chief complaint on file.        4/2/2024    10:07 AM   Additional Questions   Roomed by SERGEY Olivarez   Accompanied by N/A     {ROOMER if patient is in their first year of Medicare a vision screen is required click here to document the Vison screen and then refresh the note to pull in results  :163589}    Health Care Directive  Patient does not have a Health Care Directive or Living Will: Patient states has Advance Directive and will bring in a copy to clinic.    HPI        ***  {MA/LPN/RN Pre-Provider Visit Orders- hCG/UA/Strep (Optional):572269}  {SUPERLIST (Optional):899322}  {additonal problems for provider to add (Optional):341765}      2/23/2023   General Health   How would you rate your overall physical health? Poor         2/23/2023   Nutrition   At least 4 servings of fruits and vegetables/day No         2/23/2023   Exercise   Frequency of exercise: 1 day/week         1/4/2024   Social Factors   Worry food won't last until get money to buy more No   Food not last or not have enough money for food? Yes   Do you have housing?  Yes   Are you worried about losing your housing? Yes   Lack of transportation? No   Unable to get utilities (heat,electricity)? No   Want help with housing or utility concern? (!) YES         3/2/2023   Fall Risk   Fallen 2 or more times in the past year? Yes          2/23/2023   Activities of Daily Living- Home Safety   Needs help with the following daily activites Preparing meals    Housework    Money management   Safety concerns in the home None of the above          No data to display                  2/23/2023   Hearing Screening   Hearing concerns? Difficulty following a conversation in a noisy restaurant or crowded room     Difficulty understanding soft or whispered speech            No data to display                   { Rooming Staff Patient needs a PHQ as part of the AWV.  Use this link to complete and then refresh the note to pull results Link to PHQ9 Assessment :451206}  {USE TO PULL IN PHQ RESULTS FOR TODAY:952057}        2/23/2023   Substance Use   Alcohol more than 3/day or more than 7/wk No     Social History     Tobacco Use    Smoking status: Never    Smokeless tobacco: Never   Vaping Use    Vaping Use: Never used   Substance Use Topics    Alcohol use: Not Currently     Alcohol/week: 0.0 standard drinks of alcohol    Drug use: Not Currently     Types: Cocaine, Marijuana, Methamphetamines, Opiates, IV, Amphetamines, Barbiturates, Benzodiazepines, Codeine, Fentanyl, Hashish, Hydrocodone, Hydromorphone, LSD, Oxycodone     {Provider  If there are gaps in the social history shown above, please follow the link to update and then refresh the note Link to Social and Substance History :398944}  Last PSA:   PSA   Date Value Ref Range Status   01/09/2019 0.19 0 - 4 ug/L Final     Comment:     Assay Method:  Chemiluminescence using Siemens Vista analyzer     ASCVD Risk   The ASCVD Risk score (Pauline LIZARRAGA, et al., 2019) failed to calculate for the following reasons:    The patient has a prior MI or stroke diagnosis    {Link to Fracture Risk Assessment Tool (Optional):465805}    {Provider  Use the storyboard to review patient history, after sections have been marked as reviewed, refresh note to capture documentation:287165}  {Provider   REQUIRED AWV use this link to review and update sexual activity history  after section has been marked as reviewed, refresh note to capture documentation:413317}  Reviewed and updated as needed this visit by Provider                    {HISTORY OPTIONS (Optional):853127}  Current providers sharing in care for this patient include:  Patient Care Team:  Sherwin Mejia DO as PCP -  General (Family Medicine)  Kuldeep Borrero MD as MD (Gastroenterology)  Sherwin Mejia DO as Assigned PCP  Lisseth Arana DPM as Assigned Surgical Provider  Dylan Jose MD as MD (Neurology)  Kera Jernigan APRN CNP as Nurse Practitioner (Cardiovascular Disease)  Charly Hester MD as Assigned Cancer Care Provider  Diana Bobo, RN as Specialty Care Coordinator (Hematology & Oncology)  Rodrick Ferrer MD as Assigned Heart and Vascular Provider  Dylan Jose MD as Assigned Neuroscience Provider  Roxann Hdz, [facility] as Pharmacist (Pharmacist)  Dhruv Corea MD as MD (Pulmonary Disease)  Minda Smith [facility] as Assigned MTM Pharmacist  Minda Smith [facility] as Pharmacist (Pharmacist)  Marivel Walter PA-C as Physician Assistant (Dermatology)  Chantel Montgomery LICSW as Lead Care Coordinator  Alecia Hanley CHW as Community Health Worker  Dhruv Corea MD as Assigned Pulmonology Provider    The following health maintenance items are reviewed in Epic and correct as of today:  Health Maintenance   Topic Date Due    CONTROLLED SUBSTANCE AGREEMENT FOR CHRONIC PAIN MANAGEMENT  Never done    RSV VACCINE (Pregnancy & 60+) (1 - 1-dose 60+ series) Never done    IPV IMMUNIZATION (3 of 3 - Adult catch-up series) 07/22/2020    COLORECTAL CANCER SCREENING  08/01/2021    HEPATITIS A IMMUNIZATION (2 of 2 - Risk 2-dose series) 11/16/2022    MEDICARE ANNUAL WELLNESS VISIT  02/23/2024    FALL RISK ASSESSMENT  03/03/2024    URINE DRUG SCREEN  04/17/2024    A1C  09/21/2024    DIABETIC FOOT EXAM  12/12/2024    NANCY ASSESSMENT  12/12/2024    PHQ-9  12/12/2024    ANNUAL REVIEW OF HM ORDERS  02/15/2025    BMP  03/21/2025    LIPID  03/21/2025    MICROALBUMIN  03/21/2025    EYE EXAM  06/15/2025    ADVANCE CARE PLANNING  02/23/2028    DTAP/TDAP/TD IMMUNIZATION (5 - Td or Tdap) 01/22/2030    HEPATITIS C SCREENING  Completed    MIGRAINE ACTION PLAN  Completed    INFLUENZA VACCINE   "Completed    Pneumococcal Vaccine: 65+ Years  Completed    ZOSTER IMMUNIZATION  Completed    HEPATITIS B IMMUNIZATION  Completed    COVID-19 Vaccine  Completed    HPV IMMUNIZATION  Aged Out    MENINGITIS IMMUNIZATION  Aged Out    RSV MONOCLONAL ANTIBODY  Aged Out       {ROS Picklists (Optional):873919}     Objective    Exam  There were no vitals taken for this visit.   Estimated body mass index is 43.74 kg/m  as calculated from the following:    Height as of 1/4/24: 1.676 m (5' 6\").    Weight as of 2/14/24: 122.9 kg (271 lb).    Physical Exam  {Exam Choices (Optional):244079}  {Provider  The Mini-Cog is incomplete, use link to complete and refresh note Link to Mini-Cog :014025}       No data to display              {A Mini-Cog total score of 0-2 suggests the possibility of dementia, score of 3-5 suggests no dementia:613883}         Signed Electronically by: Sherwin Mcdermott DO  {Email feedback regarding this note to [email or web address]   :350384}  Answers submitted by the patient for this visit:  Patient Health Questionnaire (Submitted on 4/2/2024)  If you checked off any problems, how difficult have these problems made it for you to do your work, take care of things at home, or get along with other people?: Somewhat difficult  PHQ9 TOTAL SCORE: 8  NANCY-7 (Submitted on 4/2/2024)  NANCY 7 TOTAL SCORE: 7    "

## 2024-04-02 NOTE — COMMUNITY RESOURCES LIST (ENGLISH)
April 2, 2024           YOUR PERSONALIZED LIST OF SERVICES & PROGRAMS           & SHELTER    Case Management      - Online housing search assistance  [address] [address] (Distance: 9.4 miles)  Phone: [phone]  Website: http://www.housinglink.org/  Language: English, Turkish, Gabonese, Hmong  Accessibility: Ada accessible      Housing Online - https://AppUpper - ASO/  [address] [place] [address] (Distance: 10.4 miles)  Website: https://AppUpper - ASO  Language: English      [employer] - Housing Stabilization Services  Phone: [phone]) [phone]  Website: https://homebasemn.com/  Language: English  Hours: Mon 8:00 AM - 4:00 PM Tue 8:00 AM - 4:00 PM Wed 8:00 AM - 4:00 PM Thu 8:00 AM - 4:00 PM Fri 8:00 AM - 4:00 PM  Fee: Free  Accessibility: Blind accommodation, Deaf or hard of hearing  Transportation Options: Free transportation    Payment Assistance      [employer]) - Elders Respite Care and Elders Day Services  [address] [place] [address] (Distance: 7.2 miles)  Phone: [phone]) [phone]  Website: https://www.MultiZona.com.org/ADS.html  Language: English  Fee: Free  Accessibility: Ada accessible, Translation services  Transportation Options: Free transportation      [employer] - Keep the Key  Phone: [phone] [phone]  Website: https://www.idx70-kesajzhbqivnos.org/programs.html  Language: English  Hours: Mon 7:00 AM - 7:00 PM Tue 7:00 AM - 7:00 PM Wed 7:00 AM - 7:00 PM Thu 7:00 AM - 7:00 PM Fri 7:00 AM - 7:00 PM  Fee: Free      - FINANCIAL SERVICES  Phone: [phone]  Website: http://www.Realie    Mediation & Eviction Prevention      [employer]) - Elders Respite Care and Elders Day Services  [address] [place] [address] (Distance: 7.2 miles)  Phone: [phone]  Website: https://www.laoamerica.org/ADS.html  Language: English   Fee: Free  Accessibility: Ada accessible, Translation services  Transportation Options: Free transportation      Living - Housing Stabilization Services  [address] [place] [address] (Distance: 10.5 miles)  Phone: [phone]  Website: https://www.[employer]  Language: Irish, English, Montenegrin  Fee: Insurance, Self pay      Line - Tenant Rights / Eviction Prevention  Website: https://[facility].org/j-qnmq-ml-/  Language: English, Citizen of the [place]        & RECREATION    Sports      Association - Offered Programs  [address] [place] [address] (Distance: 2.2 miles)  Website: https://www.[facility].org/programs  Language: English  Fee: Free, Self pay  Accessibility: Translation services      [facility] - Adult Enrichment  Phone: [phone]  Website: https://[employer]/adults-seniors/adult-enrichment/  Language: English  Hours: Mon 7:30 AM - 4:00 PM Tue 7:30 AM - 4:00 PM Wed 7:30 AM - 4:00 PM Thu 7:30 AM - 4:00 PM Fri 7:30 AM - 4:00 PM      LEAGUE - LITTLE LEAGUE BASEBALL AND SOFTBALL  Website: http://www.littleleague.org    Classes/Groups      [employer] - Gym or workout facility - [facility] Endocrine [employer] [facility]  [address] [place] [address] (Distance: 14.3 miles)  Phone: [phone]  Language: English, Citizen of the [place]  Fee: Free, Self pay  Accessibility: Translation services      [employer] - Gym or workout facility - [facility] Linkedwithation [facility]  [address] [place] [address] (Distance: 12.6 miles)  Language: English  Fee: Free, Self pay  Accessibility: Ada accessible      [facility] - Adult Enrichment  Phone: [phone]  Website: https://[employer]/[place]ola-seniors/adult-enrichment/  Language: English  Hours: Mon 7:30 AM - 4:00 PM Tue 7:30 AM - 4:00 PM Wed 7:30 AM - 4:00 PM Thu 7:30 AM - 4:00  PM Fri 7:30 AM - 4:00 PM               IMPORTANT NUMBERS & WEBSITES        Emergency Services  911  .   United Way  211 http://211unitedway.org  .   Poison Control  (837) 739-8763 http://[place]poison.org http://wisconsinpoison.org  .     Suicide and Crisis Lifeline  988 http://985Etalialine.org  .   Childhelp [place] Child Abuse Hotline  653.848.4020 http://Childhelphotline.org   .   National Sexual Assault Hotline  (631) 402-2616 (HOPE) http://SweetPerkn.numares GmbH   .     [place] Runaway Safeline  (930) 786-3111 (RUNAWAY) http://StartBullruFreedom2.numares GmbH  .   Pregnancy & Postpartum Support  Call/text 698-157-9579  [place]: http://ppsupportmn.org  [place]: http://psichapters.com/wi  .   Substance Abuse National Helpline ([facility])  553-213-HELP (9157) http://Findtreatment.gov   .                DISCLAIMER: These resources have been generated via the [employer]. [employer] does not endorse any service providers mentioned in this resource list. [employer] does not guarantee that the services mentioned in this resource list will be available to you or will improve your health or wellness.    [facility]

## 2024-04-09 ENCOUNTER — TELEPHONE (OUTPATIENT)
Dept: PHARMACY | Facility: CLINIC | Age: 69
End: 2024-04-09
Payer: COMMERCIAL

## 2024-04-09 NOTE — TELEPHONE ENCOUNTER
Retail Pharmacy Prior Authorization Team   Phone: [phone]    PA Initiation    Medication: FREESTYLE VERA 3 SENSOR [facility]  Insurance Company: [employer] ([facility]) - Phone [phone] Fax [phone]  Pharmacy Filling the Rx: [facility] - [place], [place] - [address]  Filling Pharmacy Phone: [phone]  Filling Pharmacy Fax:    Start Date: 4/9/2024        Note: Due to record-high volumes, our turn-around time is taking longer than usual . We are currently 10 business days behind in the pools.   We are working diligently to submit all requests in a timely manner and in the order they are received. Please only flag TRUE URGENT requests as high priority to the pool at this time.   If you have questions - please send a note/message in the active PA encounter and send back to the RPPA (Retail Pharmacy Prior Authorization) team [[id number]].    If you have more specific questions about our process please reach out to our supervisor Devorah Small.   Thank you!

## 2024-04-09 NOTE — TELEPHONE ENCOUNTER
Called and to check-in with patient about how the Ozempic restart on the 0.25 mg weekly dose. Says that he gave his first dose on 03/29/24 then forgot if this was to be done weekly, missed the dose on 04/05/24.  Discussed taking the next dose this Friday. No complaints of GI upset with the first dose. Says that he has been missing doses of the Lantus and needs to set up the Freestyle Gabriel 3. Discussed that the Lantus is to be taken daily, and the Gabriel can help him track his blood sugars. Advised he download the Gabriel 3 martha to sync with his phone and follow the package instructions/pictures to place the sensor. Advised that if it was too difficult that we could make an in-person visit to go over medications and using the CGM.     Roxann Hdz [facility] on 4/9/2024 at 12:09 PM

## 2024-04-11 NOTE — TELEPHONE ENCOUNTER
Retail Pharmacy Prior Authorization Team   Phone: [phone]    Prior Authorization Approval    Medication: FREESTYLE VERA 3 SENSOR [facility]  Authorization Effective Date: 4/10/2024  Authorization Expiration Date: 12/31/2024  Insurance Company: [employer] ([facility]) - Phone [phone] Fax [phone]  Which Pharmacy is filling the prescription: [facility] - [facility] [address]  Pharmacy Notified: YES  Patient Notified: YES **Instructed pharmacy to notify patient when script is ready to /ship.**

## 2024-04-17 ENCOUNTER — HOSPITAL ENCOUNTER (OUTPATIENT)
Dept: WOUND CARE | Facility: CLINIC | Age: 69
Discharge: HOME | End: 2024-04-17
Attending: FAMILY MEDICINE | Admitting: FAMILY MEDICINE
Payer: COMMERCIAL

## 2024-04-17 VITALS
DIASTOLIC BLOOD PRESSURE: 93 MMHG | WEIGHT: 250 LBS | HEIGHT: 67 IN | TEMPERATURE: 96 F | HEART RATE: 95 BPM | BODY MASS INDEX: 39.24 KG/M2 | SYSTOLIC BLOOD PRESSURE: 167 MMHG

## 2024-04-17 DIAGNOSIS — L97.922 CHRONIC ULCER OF LEFT LEG WITH FAT LAYER EXPOSED (H): ICD-10-CM

## 2024-04-17 DIAGNOSIS — S91.109A WOUND, OPEN, TOE WITH COMPLICATION, INITIAL ENCOUNTER: ICD-10-CM

## 2024-04-17 DIAGNOSIS — L97.912: ICD-10-CM

## 2024-04-17 DIAGNOSIS — M79.89 OTHER SPECIFIED SOFT TISSUE DISORDERS: ICD-10-CM

## 2024-04-17 DIAGNOSIS — R60.0 LEG EDEMA: Primary | ICD-10-CM

## 2024-04-17 DIAGNOSIS — I89.0 LYMPHEDEMA: ICD-10-CM

## 2024-04-17 PROBLEM — L03.115 CELLULITIS OF RIGHT LOWER EXTREMITY: Status: RESOLVED | Noted: 2023-03-12 | Resolved: 2024-04-17

## 2024-04-17 PROBLEM — L03.119 CELLULITIS OF LOWER EXTREMITY, UNSPECIFIED LATERALITY: Status: RESOLVED | Noted: 2023-09-26 | Resolved: 2024-04-17

## 2024-04-17 PROCEDURE — G0463 HOSPITAL OUTPT CLINIC VISIT: HCPCS | Mod: 25

## 2024-04-17 PROCEDURE — 99215 OFFICE O/P EST HI 40 MIN: CPT | Performed by: FAMILY MEDICINE

## 2024-04-17 PROCEDURE — 97602 WOUND(S) CARE NON-SELECTIVE: CPT

## 2024-04-17 NOTE — PROGRESS NOTES
Wound Clinic Note         Visit date: 04/17/2024       Cheif Complaint:     Parmjit Hernández is a 68 year old   male had concerns including WOUND CARE.  The patient has lower extremity edema and bilateral leg ulcers.         HISTORY OF PRESENT ILLNESS:    Parmjit Hernández reports the wound has been present since January 2024.  The wound began as a water blister that ruptured.   He has lymphedema and has had areas open and closed on his legs over the years many times in the past.    He has not been wearing any bandages on the areas but does wear edema wear stockings every day.  He has several pairs of these that he washes and alternates between.  There is been light serous drainage dripping from the legs.        The pateint denies fevers or chills.  They report the pain from the wound has been 0/10 and has remained about the same recently.      The patient reports they currently do not have any routine for elevating their legs.     Today the patient reports maintaining a regular diet without special attention to protein.        The patient denies a history of diabetes, smoking or chronic steroid use.         The patient has not had any symptoms of infection relating to the wound recently and is not currently on antibiotics.       Problem List:   Past Medical History:   Diagnosis Date    Acquired lymphedema 02/04/2019    Allergic state     Amyloidosis (H)     followed by hematology Dr. Romeo status post peripheral stem cell transplant    Anxiety 05/11/2016    Anxiety and depression 12/18/2013    Bipolar I disorder (H) 09/01/2015    Under care of psychiatrist [facility]- Dr Rahman doxepin 25 mg, 2 cap bedtime DULoxetine 20 mg once daily  OLANZapine 7.5 mg  1 tab bedtime     Cerebral infarction, unspecified (H) 07/13/2015    DDD (degenerative disc disease), lumbar 08/06/2020    Depressive disorder 1995    EKG abnormality 04/20/2020    H/O bone marrow transplant (H)     Headache(784.0)     History of diverticulitis  01/27/2015    1/15-rxed with antibiotics     Hyperlipidemia LDL goal <100 10/31/2010    Hypertension 2007    Hypertension goal BP (blood pressure) < 140/90 10/11/2011    Marianne (H) 08/20/2015    Morbid obesity (H) 08/28/2018    Myalgia and myositis, unspecified     Narcotic dependence (H) 09/06/2016    None in about 6 months- 8/1/17    NSTEMI (non-ST elevated myocardial infarction) (H) 04/19/2020    OCD (obsessive compulsive disorder)     Other acquired absence of organ 1994    Other cirrhosis of liver (H) possibly from vences  04/15/2020    Other specified viral warts     Peripheral edema 05/20/2018    Noncardiac Continue with  furosemide (LASIX) 20 MG tablet,  potassium       chloride SA (K-DUR/KLOR-CON M) 10 MEQ CR  Tab once daily  Basic metabolic panel    Polyneuropathy associated with underlying disease (H24) 02/04/2019    Restless legs syndrome (RLS) 06/29/2017    Stasis dermatitis of both legs 12/31/2018    Type 2 diabetes mellitus with other specified complication (H) 07/10/2017             Family Hx: family history includes Alcohol/Drug in his father, maternal grandmother, mother, and paternal grandfather; Allergies in his father and sister; Arthritis in his mother; Asthma in his father; C.A.D. in his father, maternal grandmother, and mother; Cerebrovascular Disease in his maternal grandfather, maternal grandmother, mother, paternal grandfather, and paternal grandmother; Circulatory in his father; Depression in his daughter, father, sister, and son; Diabetes in his brother; Gynecology in his sister; Heart Disease in his father; Hypertension in his father and mother; Psychotic Disorder in his son; Respiratory in his father.       Surgical Hx:   Past Surgical History:   Procedure Laterality Date    ABDOMEN SURGERY  2007    abdominal hernia    BACK SURGERY  8/6/2020    BIOPSY  june 2015    negative    BMT PROTOCOL      for systemic amyloidosis    BONE MARROW BIOPSY, BONE SPECIMEN, NEEDLE/TROCAR N/A 6/8/2016     Procedure: BIOPSY BONE MARROW;  Surgeon: Nathan Agrawal MD;  Location:  GI    BONE MARROW BIOPSY, BONE SPECIMEN, NEEDLE/TROCAR N/A 2/20/2019    Procedure: BIOPSY BONE MARROW;  Surgeon: Michael Raygoza MD;  Location:  GI    CHOLECYSTECTOMY  1995    lap qian    COLONOSCOPY  2012    hx polyps    ESOPHAGOSCOPY, GASTROSCOPY, DUODENOSCOPY (EGD), COMBINED N/A 5/29/2015    Procedure: COMBINED ESOPHAGOSCOPY, GASTROSCOPY, DUODENOSCOPY (EGD), BIOPSY SINGLE OR MULTIPLE;  Surgeon: John Jacob MD;  Location:  GI    HERNIA REPAIR, UMBILICAL  2006    [facility] NONSPECIFIC PROCEDURE  94    Cholecystectomy    [facility] NONSPECIFIC PROCEDURE  2000    repair deviated septum          Allergies:    Allergies   Allergen Reactions    Amoxicillin Diarrhea    Cephalexin Diarrhea    Liraglutide Other (See Comments), Headache and Unknown     Other reaction(s): Headache, Unknown  PN: migraines - Increased migraine frequency and severity      Sulfa Antibiotics Angioedema and Swelling     Pt has taken  Taken sulfa drugs orally without trouble. He had problems with Sulfa eye drops. Eye swelled up                Medication History:    Current Outpatient Medications   Medication Sig Dispense Refill    acetaminophen (TYLENOL) 500 MG tablet Take 1,000 mg by mouth every 8 hours as needed      albuterol (PROAIR HFA/PROVENTIL HFA/VENTOLIN HFA) 108 (90 Base) MCG/ACT inhaler Inhale 2 puffs into the lungs every 6 hours as needed for shortness of breath, wheezing or cough 18 g 0    aspirin (ASPIRIN LOW DOSE) 81 MG tablet Take 1 tablet (81 mg) by mouth daily 30 tablet 3    atorvastatin (LIPITOR) 20 MG tablet Take 1.5 tablets (30 mg) by mouth daily 135 tablet 3    Bioflavonoid Products (CANDIDO-C) TABS Take 1,000 mg by mouth daily 90 tablet 3    bisacodyl (DULCOLAX) 5 MG EC tablet Two days prior to exam take two (2) tablets at 4pm. One day prior to exam take two (2) tablets at 4pm (Patient not taking: Reported on 4/2/2024) 4 tablet 0     buPROPion (WELLBUTRIN XL) 150 MG 24 hr tablet Take 1 tablet (150 mg) by mouth every morning 90 tablet 3    calcium carbonate (OS-LUIS) 1500 (600 Ca) MG tablet Take 600 mg by mouth daily      cariprazine (VRAYLAR) 4.5 MG capsule Take 1 capsule (4.5 mg) by mouth daily 90 capsule 3    Continuous Blood Gluc  (FREESTYLE VERA 3 READER) MARAL 1 each every 14 days To test blood sugar 1 each 0    DULoxetine (CYMBALTA) 30 MG capsule Take 1 capsule (30 mg) by mouth 2 times daily 180 capsule 3    Folic Acid-Vit B6-Vit B12 0.5-5-0.2 MG TABS Take 1 tablet by mouth daily 90 tablet 3    furosemide (LASIX) 20 MG tablet Take 2 tablets (40 mg) daily x 7 days. Then take 1  tablet (20 mg) daily 90 tablet 0    glucose 40 % (400 mg/mL) gel Take by mouth every 15 minutes as needed for low blood sugar      insulin glargine (LANTUS PEN) 100 UNIT/ML pen Inject 50 Units Subcutaneous at bedtime      insulin pen needle (31G X 8 MM) 31G X 8 MM miscellaneous Use one pen needles daily or as directed. 100 each 1    loperamide (IMODIUM) 2 MG capsule Take 1 capsule (2 mg) by mouth 4 times daily as needed for diarrhea      losartan (COZAAR) 100 MG tablet Take 1 tablet (100 mg) by mouth daily 90 tablet 3    medication given by implanted intrathecal pump continuous Drug # 1: Fentanyl (Sublimaze) - Conc: 2000 mcg/mL - Total Dose / 24 hours: 1663.3 mcg  Drug # 2: Bupivacaine (Marcaine)  - Conc: 20 mg/mL - Total Dose / 24 hours: 16.633 mg  Drug # 3: Morphine (Duramorph or Infumorph)  - Conc: 9.6 mg/mL - Total Dose / 24 hours: 7.9836 mg    Pump Reservoir Volume: 40 mL  Pump Reservoir Alarm Volume: 2 ml  Outside Clinic & Provider: Dr. Pawan Shaw, [facility]  Last Refill Date: 12/21/23  Next Refill Date: 1/28/24  Low [place] Alarm Date:  Pump : Medtronic SynchroMed II    Boluses: Up to 3 per day, 4 minute duration. Lock-out every 6 hours (also has dose restriction interval noted 1 per 8 hours)   Fentanyl: 110.1  mcg/dose  Bupivacaine: 1.101 mg/dose  Morphine: 0.5287 mg/dose      metFORMIN (GLUCOPHAGE XR) 500 MG 24 hr tablet Take 2 tablets (1,000 mg) by mouth 2 times daily (with meals) 360 tablet 3    metoprolol succinate ER (TOPROL XL) 100 MG 24 hr tablet Take 1 tablet (100 mg) by mouth daily 90 tablet 3    modafinil (PROVIGIL) 200 MG tablet Take 2 tablets (400 mg) by mouth daily Pt has increased his dose to 400mg daily from 300mg daily.  Has a message in to his physician for a dose increase. 60 tablet 2    mometasone (ELOCON) 0.1 % external cream Apply topically daily as needed For psoriasis on face      multivitamin w/minerals (THERA-VIT-M) tablet Take 1 tablet by mouth daily Men's 50+      nitroGLYcerin (NITROSTAT) 0.4 MG sublingual tablet Place 1 tablet (0.4 mg) under the tongue every 5 minutes as needed for chest pain For chest pain place 1 tablet under the tongue every 5 minutes for 3 doses. If symptoms persist 5 minutes after 1st dose call 911. 30 tablet 1    polyethylene glycol (GOLYTELY) 236 g suspension Take as directed. Two days before your exam fill the first container with water. Cover and shake until mixed well. At 5:00pm drink one 8oz glass every 10-15 minutes until half (1/2) of the first container is empty. Store the remainder in the refrigerator. One day before your exam at 5:00pm drink the second half of the first container until it is gone. Before you go to bed mix the second container with water and put in refrigerator. Six hours before your check in time drink one 8oz glass every 10-15 minutes until half of container is empty. Discard the remainder of solution. (Patient not taking: Reported on 4/2/2024) 8000 mL 0    POTASSIUM PO Take 1 tablet by mouth daily Unspecified tablet strength; patient estimated 600 mg, takes for leg cramps      pregabalin (LYRICA) 100 MG capsule Take 1 capsule (100 mg) by mouth at bedtime      semaglutide (OZEMPIC) 2 MG/3ML pen Inject 0.5 mg Subcutaneous every 7 days 3 mL 2     "senna-docusate (SENOKOT-S/PERICOLACE) 8.6-50 MG tablet Take 1-2 tablets by mouth 2 times daily as needed      SKYRIZI  MG/ML subcutaneous Inject 150 mg Subcutaneous once      SUMAtriptan (IMITREX) 50 MG tablet Take 1 tablet (50 mg) by mouth at onset of headache for migraine May repeat in 2 hours. Max 4 tablets/24 hours. 8 tablet 1    tamsulosin (FLOMAX) 0.4 MG capsule Take 1 capsule (0.4 mg) by mouth 2 times daily 180 capsule 3    testosterone (ANDROGEL/TESTIM) 50 MG/5GM (1%) topical gel Place 1 packet (50 mg of testosterone) onto the skin daily (Patient not taking: Reported on 4/2/2024) 30 packet 1    vitamin B-Complex Take 3 tablets by mouth daily      vitamin D3 (CHOLECALCIFEROL) 50 mcg (2000 units) tablet Take 1 tablet (50 mcg) by mouth daily 90 tablet 3     No current facility-administered medications for this encounter.         Tobacco History:  reports that he has never smoked. He has never used smokeless tobacco.       REVIEW OF SYMPTOMS:   The review of systems was negative except as noted in the HPI.           PHYSICAL EXAMINATION:     BP (!) 167/93 (BP Location: Left arm, Patient Position: Sitting)   Pulse 95   Temp (!) 96  F (35.6  C) (Temporal)   Ht 1.702 m (5' 7\")   Wt 113.4 kg (250 lb)   BMI 39.16 kg/m             GENERAL: The patient overall appears well and is no acute distress.   HEAD: normocephalic   EYES: Sclera and conjunctiva clear   NECK: no obvious masses   LUNGS: breathing is unlabored.   EXTREMITIES: No clubbing, cyanosis or edema   SKIN: No rashes or other abnormalities except as noted under the Wound section below.   NEUROLOGICAL: normal motor and sensory function   EDEMA: Moderate  and Lymphedema       WOUND: The wound appears healthy with no sign of infection.   Wound bed: granulation tissue  Periwound: healthy intact skin  Just a few pinpoint open areas on both anterior shins.      Also see below for wound details:       Circumferential volume measures:             No data " to display                Ulceration(s)/Wound(s):   Please see the media tab under the chart review for pictures of the wounds.  Nursing staff removed dressings and cleansed wound.    Wound (used by OP [facility] only) 04/17/24 0841 Right medial;lower leg ulceration, venous (Active)   Thickness/Stage full thickness 04/17/24 0800   Base pink;red 04/17/24 0800   Periwound edematous;blistered;redness 04/17/24 0800   Periwound Temperature warm 04/17/24 0800   Periwound Skin Turgor soft 04/17/24 0800   Edges open 04/17/24 0800   Length (cm) 2 04/17/24 0800   Width (cm) 1.5 04/17/24 0800   Depth (cm) 0.1 04/17/24 0800   Wound (cm^2) 3 cm^2 04/17/24 0800   Wound Volume (cm^3) 0.3 cm^3 04/17/24 0800   Drainage Characteristics/Odor serosanguineous 04/17/24 0800   Drainage Amount moderate 04/17/24 0800   Care, Wound non-select wound debridement performed. 04/17/24 0800       Wound (used by OP [facility] only) 04/17/24 0841 Left anterior;lower leg ulceration, venous (Active)   Thickness/Stage full thickness 04/17/24 0800   Base pink;red 04/17/24 0800   Periwound edematous;blistered;redness 04/17/24 0800   Periwound Temperature warm 04/17/24 0800   Periwound Skin Turgor soft 04/17/24 0800   Edges open 04/17/24 0800   Length (cm) 0.3 04/17/24 0800   Width (cm) 0.3 04/17/24 0800   Depth (cm) 0.1 04/17/24 0800   Wound (cm^2) 0.09 cm^2 04/17/24 0800   Wound Volume (cm^3) 0.01 cm^3 04/17/24 0800   Drainage Characteristics/Odor serosanguineous 04/17/24 0800   Drainage Amount moderate 04/17/24 0800   Care, Wound non-select wound debridement performed. 04/17/24 0800             Recent Labs   Lab Test 03/21/24  1104 09/18/23  1629 05/12/23  0634   A1C 10.9* 9.8* 8.8*          Recent Labs   Lab Test 03/21/24  1104 12/25/23  0747 12/24/23  1442   ALBUMIN 4.1 3.8 3.9              No sharp debridement performed today.                  ASSESSMENT:   This is a 68 year old  male with bilateral leg ulcers, the patient also has lower extremity edema which was  also managed during today's clinic visit.          PLAN:   He will bandage the areas with an ABD pad and his edema wear stocking change 3 times a week.  I will order a venous insufficiency ultrasound to evaluate if there is any areas of superficial venous insufficiency causing these wounds to develop.    Separate from the wound care instructions we then discussed management strategies for lower extremity edema.  I explained the keys for managing lower extremity edema are compression and elevation.  I explained to the patient today that controlling the edema is probably the most important thing we can do to help heal the wound.  I have specifically recommended that they lay down with their legs above the level of the heart for 30 minutes at least twice a day.     I have explained to the patient the importance of protein intake to wound healing.  I have explained that increasing protein intake will speed wound healing.  We discussed several types of food that are high in protein and the wound care nurse gave the patient a handout that summarizes this information.  In addition to further speed wound healing I have encouraged the patient to take a protein supplement.   The patient will return to the wound clinic in 3 to 4 weeks to see me again.        45 minutes spent on the date of the encounter doing chart review, history and exam, documentation and further activities per the note, this time excludes any procedure time      Jose Nash MD  04/17/2024   9:16 AM   [facility]  [phone]    This note was electronically signed by Jose Nash MD        Further instructions from your care team         04/17/2024   Erickson Hernández   1955    A DME order for supplies has been placed to [employer]. If there are any issues with your order including not receiving the order please call [email or web address] at [phone]. They can also provide a tracking number for you.       Dressing  changes outside of clinic are being performed by Patient    PLAN:  -utilize your wedges to elevate your legs 2-3 times per day for 20-30 minutes  -You have been referred to [facility] in [place] [phone] or [place] [phone]      Wound Dressing Change: Left anterior lower leg and Right medial lower leg   -Wash your hands with soap and water before you begin your dressing change and prepare a clean surface for dressings.  -Cleanse with mild unscented soap and water (such as Cetaphil, Cerave or Dove)   -Secondary dressing: Apply 1 5x9 ABD pad to each area  -Secure with 1 conforming roll gauze for each leg and secure with medipore tape  -Pull up navy stripe edemawear from toes to behind the knee  -Change three days per week     Then apply Navy Stripe EdemaWear from toes to knee. EdemaWear should be worn 24/7 unless bathing/showering or changing the dressing. You will wash and reuse the EdemaWear. DO NOT CUT THE EDEMAWEAR. IF IT IS TOO LONG THEN CUFF THE EDEMAWEAR (the EdemaWear can shrink length wise with washing).        Elevation:  It is recommended that you elevate your legs above the level of your heart for 30 minutes: approximately 2-3 times each day   Ways to do this:   - Lay on the couch or your bed and prop your legs up on pillows   - Recline back as far as you can go in your recliner and prop your legs on pillows.   Doing these things will help reduce the edema in your legs.        A diet high in protein is important for wound healing, we recommend getting 90 grams of protein per day. Taking protein shakes or bars are a good way to get extra protein in your diet.   Good sources of protein:  Pork 26g per 3 oz  Whey protein powder - 24g per scoop (on average)  Greek yogurt - 23g per 8oz   Chicken or Turkey - 23g per 3oz  Fish - 20-25g per 3oz  Beef - 18-23g per 3oz  Tofu - 10g per 1/2 cup  Navy beans - 20g per cup  Cottage cheese - 14g per 1/2 cup   Lentils - 13g per 1/4  cup  Beef jerky 13g per 1oz  2% milk - 8g per cup  Peanut butter - 8g per 2 tablespoons  Eggs - 6g per egg  Mixed nuts - 6g per 2oz     Main Provider: Jose Nash M.D. April 17, 2024    Call us at [phone] if you have any questions about your wounds, if you have redness or swelling around your wound, have a fever of 101 degrees Fahrenheit or greater or if you have any other problems or concerns. We answer the phone Monday through Friday 8 am to 4 pm, please leave a message as we check the voicemail frequently throughout the day. If you have a concern over the weekend, please leave a message and we will return your call Monday. If the need is urgent, go to the ER or urgent care.    If you had a positive experience please indicate that on your patient satisfaction survey form that [facility] will be sending you.    It was a pleasure meeting with you today.  Thank you for allowing me and my team the privilege of caring for you today.  YOU are the reason we are here, and I truly hope we provided you with the excellent service you deserve.  Please let us know if there is anything else we can do for you so that we can be sure you are leaving completely satisfied with your care experience.      If you have any billing related questions please call the [facility] Business office at [phone]. The clinic staff does not handle billing related matters.    If you are scheduled to have a follow up appointment, you will receive a reminder call the day before your visit. On the appointment day please arrive 15 minutes prior to your appointment time. If you are unable to keep that appointment, please call the clinic to cancel or reschedule. If you are more than 10 minutes late or greater for your scheduled appointment time, the clinic policy is that you may be asked to reschedule.         ,

## 2024-04-17 NOTE — DISCHARGE INSTRUCTIONS
04/17/2024   Erickson Hernández   1955    A DME order for supplies has been placed to [employer]. If there are any issues with your order including not receiving the order please call [employer] at [phone]. They can also provide a tracking number for you.       Dressing changes outside of clinic are being performed by Patient    PLAN:  -utilize your wedges to elevate your legs 2-3 times per day for 20-30 minutes  -You have been referred to [facility] in [place] [phone] or [place] [phone]      Wound Dressing Change: Left anterior lower leg and Right medial lower leg   -Wash your hands with soap and water before you begin your dressing change and prepare a clean surface for dressings.  -Cleanse with mild unscented soap and water (such as Cetaphil, Cerave or Dove)   -Secondary dressing: Apply 1 5x9 ABD pad to each area  -Secure with 1 conforming roll gauze for each leg and secure with medipore tape  -Pull up navy stripe edemawear from toes to behind the knee  -Change three days per week     Then apply Navy Stripe EdemaWear from toes to knee. EdemaWear should be worn 24/7 unless bathing/showering or changing the dressing. You will wash and reuse the EdemaWear. DO NOT CUT THE EDEMAWEAR. IF IT IS TOO LONG THEN CUFF THE EDEMAWEAR (the EdemaWear can shrink length wise with washing).        Elevation:  It is recommended that you elevate your legs above the level of your heart for 30 minutes: approximately 2-3 times each day   Ways to do this:   - Lay on the couch or your bed and prop your legs up on pillows   - Recline back as far as you can go in your recliner and prop your legs on pillows.   Doing these things will help reduce the edema in your legs.        A diet high in protein is important for wound healing, we recommend getting 90 grams of protein per day. Taking protein shakes or bars are a good way to get extra protein in your diet.   Good sources of protein:  Pork  26g per 3 oz  Whey protein powder - 24g per scoop (on average)  Greek yogurt - 23g per 8oz   Chicken or Turkey - 23g per 3oz  Fish - 20-25g per 3oz  Beef - 18-23g per 3oz  Tofu - 10g per 1/2 cup  Navy beans - 20g per cup  Cottage cheese - 14g per 1/2 cup   Lentils - 13g per 1/4 cup  Beef jerky 13g per 1oz  2% milk - 8g per cup  Peanut butter - 8g per 2 tablespoons  Eggs - 6g per egg  Mixed nuts - 6g per 2oz     Main Provider: Jose Nash M.D. April 17, 2024    Call us at [phone] if you have any questions about your wounds, if you have redness or swelling around your wound, have a fever of 101 degrees Fahrenheit or greater or if you have any other problems or concerns. We answer the phone Monday through Friday 8 am to 4 pm, please leave a message as we check the voicemail frequently throughout the day. If you have a concern over the weekend, please leave a message and we will return your call Monday. If the need is urgent, go to the ER or urgent care.    If you had a positive experience please indicate that on your patient satisfaction survey form that [facility] will be sending you.    It was a pleasure meeting with you today.  Thank you for allowing me and my team the privilege of caring for you today.  YOU are the reason we are here, and I truly hope we provided you with the excellent service you deserve.  Please let us know if there is anything else we can do for you so that we can be sure you are leaving completely satisfied with your care experience.      If you have any billing related questions please call the [facility] Business office at [phone]. The clinic staff does not handle billing related matters.    If you are scheduled to have a follow up appointment, you will receive a reminder call the day before your visit. On the appointment day please arrive 15 minutes prior to your appointment time. If you are unable to keep that appointment, please call the clinic to cancel or  reschedule. If you are more than 10 minutes late or greater for your scheduled appointment time, the clinic policy is that you may be asked to reschedule.

## 2024-04-18 ENCOUNTER — TRANSFERRED RECORDS (OUTPATIENT)
Dept: HEALTH INFORMATION MANAGEMENT | Facility: CLINIC | Age: 69
End: 2024-04-18

## 2024-04-18 ENCOUNTER — ALLIED HEALTH/NURSE VISIT (OUTPATIENT)
Dept: FAMILY MEDICINE | Facility: CLINIC | Age: 69
End: 2024-04-18
Payer: COMMERCIAL

## 2024-04-18 ENCOUNTER — VIRTUAL VISIT (OUTPATIENT)
Dept: PHARMACY | Facility: CLINIC | Age: 69
End: 2024-04-18
Payer: COMMERCIAL

## 2024-04-18 ENCOUNTER — TELEPHONE (OUTPATIENT)
Dept: FAMILY MEDICINE | Facility: CLINIC | Age: 69
End: 2024-04-18
Payer: COMMERCIAL

## 2024-04-18 VITALS — SYSTOLIC BLOOD PRESSURE: 114 MMHG | DIASTOLIC BLOOD PRESSURE: 86 MMHG

## 2024-04-18 DIAGNOSIS — Z79.4 TYPE 2 DIABETES MELLITUS WITH HYPERGLYCEMIA, WITH LONG-TERM CURRENT USE OF INSULIN (H): Primary | ICD-10-CM

## 2024-04-18 DIAGNOSIS — E11.65 TYPE 2 DIABETES MELLITUS WITH HYPERGLYCEMIA, WITH LONG-TERM CURRENT USE OF INSULIN (H): Primary | ICD-10-CM

## 2024-04-18 DIAGNOSIS — I10 HYPERTENSION GOAL BP (BLOOD PRESSURE) < 140/90: ICD-10-CM

## 2024-04-18 DIAGNOSIS — L40.9 PSORIASIS: ICD-10-CM

## 2024-04-18 DIAGNOSIS — I10 ESSENTIAL HYPERTENSION: Primary | ICD-10-CM

## 2024-04-18 DIAGNOSIS — F31.11 BIPOLAR 1 DISORDER, MANIC, MILD (H): ICD-10-CM

## 2024-04-18 DIAGNOSIS — E78.2 MIXED HYPERLIPIDEMIA: ICD-10-CM

## 2024-04-18 PROCEDURE — 99207 PR NO CHARGE LOS: CPT | Mod: 93

## 2024-04-18 PROCEDURE — 99207 PR NO CHARGE NURSE ONLY: CPT | Performed by: FAMILY MEDICINE

## 2024-04-18 RX ORDER — HYDROCHLOROTHIAZIDE 25 MG/1
37.5 TABLET ORAL DAILY
Qty: 135 TABLET | Refills: 3 | Status: SHIPPED | OUTPATIENT
Start: 2024-04-18

## 2024-04-18 RX ORDER — HYDROCHLOROTHIAZIDE 25 MG/1
25 TABLET ORAL DAILY
Qty: 90 TABLET | Refills: 0 | Status: CANCELLED | OUTPATIENT
Start: 2024-04-18

## 2024-04-18 NOTE — PROGRESS NOTES
Medication Therapy Management (MTM) Encounter    ASSESSMENT:                            Medication Adherence/Access: Advised use of extra large pill boxes to help medication adherence, which the patient was open to. Also discussed using alarms or alerts to help the patient remember to take medications, which was declined at this time. May need to consider using a pharmacy that pill packs for the patient to help with adherence.      Diabetes:   Patient is not meeting A1c goal of < 7%. Patient is not meeting goal of > 70% time in target with continuous glucose monitoring. Reported sugars are improving with Lantus and Ozempic use. Patient would benefit from increasing the Lantus dose, and continuing to titrate the Ozempic every 4 weeks as tolerated. Did not discuss due to time, but future consideration should be made for starting an SGLT2i.   UACR not at goal, but appropriately taking ARB, Triglycerides are very elevated likely due high blood sugars which may improve with improving glucose control. May need to consider escalating statin intensity and potentially adding a fibrate medication in the future.     Hypertension:   Patient would benefit from taking Metoprolol  mg daily as prescribed. He would also benefit from checking blood pressures at home. Will continue monitoring kidney function and electrolytes. Discussed with patient and Dr. Radha Mcdermott adding an additional blood pressure medication like spironolactone in the future if blood pressure continues to run high.     Bipolar disorder:   Depression has been about the same, restarted the Vraylar and Duloxetine. Patient would benefit from restarting Bupropion. Will continue to monitor.     Psoriasis:   Plaques are improving with use of Skyrizi. Will continue to monitor, followed by dermatology     PLAN:                            Increase Lantus to 54 units daily   Take Metoprolol  mg daily. You can take two 50 mg tablets or start the new bottle of  100 mg  Restart Bupropion  mg daily   Check and record your blood pressures at home     Follow-up: Return in about 3 weeks (around 5/9/2024) for Follow up, with me, using a phone visit, medication therapy management.    SUBJECTIVE/OBJECTIVE:                          Erickson Hernández is a 68 year old male called for a follow-up visit from 03/21/24.       Reason for visit: Medication Follow-Up.    Allergies/ADRs: Reviewed in chart  Past Medical History: Reviewed in chart  Tobacco: He reports that he has never smoked. He has never used smokeless tobacco.  Alcohol: 4-6 beverages / week    Medication Adherence/Access:   Pill boxes are not big enough to accommodate all of the pills he has to take. So he lines up the pill bottles on the counter and takes medications when he feeds the cat. However, he sometimes picks up medications and stores them away and forgets to put them on the counter if he has a supply. Has the bottle of Metoprolol  mg but has still been taking 50 mg once daily. Similar with Bupropion, he has a full bottle but it got stored away.   He states that he is usually good about taking his insulin but occasionally forgets.  Patient receives patient assistance for Ozempic, Duloxetine, and Vraylar      Diabetes   Lantus 50 units once daily- missed a dose yesterday,  Metformin  mg two tablets twice daily with meals   Ozempic 0.5 mg - started the 0.5 mg dose last Friday,  no issues reported.   Aspirin 81mg daily    Blood sugar monitoring: He started using the Libre3 on 04/12/24  Patient Reports:   Current Blood sugar: 197 mg/dL   Reports that the time in range is 45%. No lows blood sugars   Blood sugar average: 190 mg/dL        Eye exam is up to date  Foot exam is up to date  Urine Albumin:   Lab Results   Component Value Date    UMALCR 63.73 (H) 03/21/2024      Lab Results   Component Value Date    A1C 10.9 (H) 03/21/2024     GFR Estimate   Date Value Ref Range Status   03/21/2024 68 >60  mL/min/1.73m2 Final   06/01/2021 >90 >60 mL/min/[1.73_m2] Final     Comment:     Non  GFR Calc  Starting 12/18/2018, serum creatinine based estimated GFR (eGFR) will be   calculated using the Chronic Kidney Disease Epidemiology Collaboration   (CKD-EPI) equation.       GFR Estimate If Black   Date Value Ref Range Status   06/01/2021 >90 >60 mL/min/[1.73_m2] Final     Comment:      GFR Calc  Starting 12/18/2018, serum creatinine based estimated GFR (eGFR) will be   calculated using the Chronic Kidney Disease Epidemiology Collaboration   (CKD-EPI) equation.           Hypertension   Metoprolol  mg daily- Patient states he has been taking the 50 mg tablets once daily.  Losartan 100mg daily  Furosemide 20 mg daily   Patient reports no current medication side effects. Has not been taking blood pressures at home.       BP Readings from Last 3 Encounters:   04/17/24 (!) 167/93   04/02/24 94/60   02/14/24 (!) 142/78     Pulse Readings from Last 3 Encounters:   04/17/24 95   04/02/24 82   02/14/24 91     Hyperlipidemia   Atorvastatin 20 mg daily  Patient reports no significant myalgias or other side effects.     Recent Labs   Lab Test 03/21/24  1104 02/23/23  1227 07/23/21  0831   CHOL 139 153 112   HDL 23* 35* 32*   LDL  --  79 53   TRIG 602* 197* 133       Bipolar disorder:   Bupropion  mg daily - has been forgetting to take, has missed for probably a month.  Duloxetine 30 mg twice daily   Vraylar 4.5mg daily     Depression continues to been up and down. Says that more things are happening at work that make him feel acutely worse. Denies SI  or thoughts of self harm.  No side effects reported     Psoriasis:   Skyrizi - Recently restarted, getting through patient assistance. He going to the dermatologist today. Says that he has a sufficient supply    Says that plaques plaques are improving tremendously estimates that more than half of his plaques are clearing.      He currently has  some open wounds, saw wound care yesterday for blistering, that were taken care of.     No issues reported    ----------------  I spent 30 minutes with this patient today. All changes were made via verbal approval with Sherwin Mcdermott DO. A copy of the visit note was provided to the patient's provider(s).    A summary of these recommendations was sent via [employer].    Gabino ReichD  Medication Therapy Management Resident, [place]  Pager: [phone]  Email: [email or web address]    Preceptor cosignature: Parmjit Hernández was seen independently by Dr. Hdz. I have reviewed the assessment and plan. Minda Smith, PharmD, TOSHIA, BCACP    Telemedicine Visit Details  Type of service:  Telephone visit  Start Time:  9:00 AM  End Time:  9:30 AM     Medication Therapy Recommendations  Hypertension goal BP (blood pressure) < 140/90    Current Medication: metoprolol succinate ER (TOPROL XL) 100 MG 24 hr tablet   Rationale: Does not understand instructions - Adherence - Adherence   Recommendation: Provide Education   Status: Patient Agreed - Adherence/Education         Type 2 diabetes mellitus (H)    Current Medication: insulin glargine (LANTUS PEN) 100 UNIT/ML pen   Rationale: Dose too low - Dosage too low - Effectiveness   Recommendation: Increase Dose - Lantus SoloStar 100 UNIT/ML soln   Status: Accepted per Provider

## 2024-04-18 NOTE — PROGRESS NOTES
Parmjit Hernández was evaluated at [facility] on April 18, 2024 at which time his blood pressure was:    BP Readings from Last 3 Encounters:   04/18/24 114/86   04/17/24 (!) 167/93   04/02/24 94/60     Pulse Readings from Last 3 Encounters:   04/17/24 95   04/02/24 82   02/14/24 91       Reviewed lifestyle modifications for blood pressure control and reduction: including making healthy food choices, managing weight, getting regular exercise, smoking cessation, reducing alcohol consumption, monitoring blood pressure regularly.     Symptoms: None    BP Goal:< 140/90 mmHg    BP Assessment:  BP at goal    Potential Reasons for BP too high: NA - Not applicable    BP Follow-Up Plan: Recheck BP in 6 months at pharmacy    Recommendation to Provider: none    Note completed by: Purvi Rodrigues, PharmD   Pharmacy manager [facility]

## 2024-04-18 NOTE — TELEPHONE ENCOUNTER
Patient calling,    Would like to know if they are due for Covid and RSV vaccines.  Patient informed they are due.  Patient will reach out to pharmacies to try and get vaccines scheduled and will update clinic with status updates.    Oscar LAMAS RN

## 2024-04-18 NOTE — PATIENT INSTRUCTIONS
"Recommendations from today's MTM visit:                                                    MTM (medication therapy management) is a service provided by a clinical pharmacist designed to help you get the most of out of your medicines.   Today we reviewed what your medicines are for, how to know if they are working, that your medicines are safe and how to make your medicine regimen as easy as possible.      Increase Lantus to 54 units daily   Take Metoprolol  mg daily. You can take two 50 mg tablets or start the new bottle of 100 mg  Start Hydrochlorothiazide 25 mg daily   Restart Bupropion  mg daily   Check and record your blood pressures at home     Follow-up: Return in about 3 weeks (around 5/9/2024) for Follow up, with me, using a phone visit, medication therapy management.    It was great speaking with you today.  I value your experience and would be very thankful for your time in providing feedback in our clinic survey. In the next few days, you may receive an email or text message from [employer] with a link to a survey related to your  clinical pharmacist.\"     To schedule another MTM appointment, please call the clinic directly or you may call the MTM scheduling line at [phone] or toll-free at [phone].     My Clinical Pharmacist's contact information:                                                      Please feel free to contact me with any questions or concerns you have.      Roxann Hdz, PharmD  Medication Therapy Management Resident, [place]  Pager: [phone]  Email: [email or web address]      "

## 2024-04-22 ENCOUNTER — TRANSFERRED RECORDS (OUTPATIENT)
Dept: HEALTH INFORMATION MANAGEMENT | Facility: CLINIC | Age: 69
End: 2024-04-22

## 2024-04-22 ENCOUNTER — MEDICAL CORRESPONDENCE (OUTPATIENT)
Dept: HEALTH INFORMATION MANAGEMENT | Facility: CLINIC | Age: 69
End: 2024-04-22

## 2024-04-23 DIAGNOSIS — R79.1 ABNORMAL COAGULATION PROFILE: ICD-10-CM

## 2024-04-23 DIAGNOSIS — Z01.818 PRE-PROCEDURAL EXAMINATION: Primary | ICD-10-CM

## 2024-04-23 DIAGNOSIS — R60.9 EDEMA, UNSPECIFIED: ICD-10-CM

## 2024-04-23 DIAGNOSIS — I89.0 LYMPHEDEMA: ICD-10-CM

## 2024-04-23 DIAGNOSIS — E11.69 TYPE 2 DIABETES MELLITUS WITH OTHER SPECIFIED COMPLICATION, WITHOUT LONG-TERM CURRENT USE OF INSULIN (H): ICD-10-CM

## 2024-04-24 ENCOUNTER — PATIENT OUTREACH (OUTPATIENT)
Dept: CARE COORDINATION | Facility: CLINIC | Age: 69
End: 2024-04-24
Payer: COMMERCIAL

## 2024-04-24 NOTE — LETTER
[facility]  Patient Centered Plan of Care  About Me:        Patient Name:  Parmjit Prescott    YOB: 1955  Age:         68 year old   Jack MRN:    [id number] Telephone Information:  Home Phone [phone]   Mobile [phone]       Address:  [address]  [facility] [address] Email address:  [email or web address]      Emergency Contact(s)    Name Relationship Lgl Grd Work Phone Home Phone Mobile Phone   1. JC PRESCOTT Daughter   [phone]   2. POOR,DON Friend   [phone]           Primary language:  English     needed? No   [facility] [employer]:  [phone] op. 1  Other communication barriers:No data recorded  Preferred Method of Communication:  Emiliana  Current living arrangement: I live alone    Mobility Status/ Medical Equipment: Independent w/Device        Health Maintenance  Health Maintenance Reviewed: No data recorded    My Access Plan  Medical Emergency 911   Primary Clinic Line [facility] [phone]   24 Hour Appointment Line [phone] or  [phone]) (toll-free)   24 Hour Nurse Line [phone] (toll-free)   Preferred Urgent Care [facility], [phone]     Preferred Hospital [facility]  [phone]     Preferred Pharmacy [facility] [address], [address]     Behavioral Health Crisis Line The National Suicide Prevention Lifeline at 1-957.836.7055 or Text/Call 198           My Care Team Members  Patient Care Team         Relationship Specialty Notifications Start End    Sherwin Mejia DO PCP - General Family Medicine  3/1/23     zreferred ot Derm    Phone: [phone] Fax: [phone] [address] [address]    Kuldeep Borrero MD MD Gastroenterology  1/11/16     Phone: [phone] Fax: [phone]         [address]  [place] [address] [facility] [address]    Sherwin Mejia DO Assigned PCP   3/11/23     Phone: [phone] Fax: [phone]         [address] [place] [address] [facility] [address]    Lisseth Arana DPM Assigned Surgical Provider   3/11/23     Phone: [phone] Fax: [phone] [address] [facility] [address] [facility] [address]    Dylan Jose MD MD Neurology  3/15/23     Phone: [phone] Fax: [phone]         [address] [facility] [address]    Kera Jernigan APRN CNP Nurse Practitioner Cardiovascular Disease  3/16/23     Phone: [phone] Fax: [phone] [address] [facility] [address]    Charly Hester MD Assigned Cancer Care Provider   3/25/23     Phone: [phone] Fax: [phone]         [address] [facility]  [facility] [address]    Diana Bobo, RN Specialty Care Coordinator Hematology & Oncology Admissions 3/27/23     Rodrick Ferrer MD Assigned Heart and Vascular Provider   4/8/23     Phone: [phone] Fax: [phone] [address] [facility] [address]    Dylan Jose MD Assigned Neuroscience Provider   9/23/23     Phone: [phone] Fax: [phone]         [address] [facility] [address]    Roxann Hdz, [facility] Pharmacist Pharmacist  11/2/23     Phone: [phone] Fax: [phone] [address] [place] [address] [facility] [address]    Dhruv Corea MD MD Pulmonary Disease  11/6/23     Phone: [phone] Fax: [phone]         [address] [facility] [address]    Minda Smith, [facility] Assigned MTM Pharmacist   11/11/23     Phone: [phone] Fax: [phone] [address], [facility] [address] [facility] [address]    Minda Smith RP Pharmacist Pharmacist  11/17/23     Phone: [phone] Fax: [phone] [address], [address] [address]    Marivel Walter PA-C Physician Assistant Dermatology  12/14/23     Phone:  [phone] Fax: [phone]         [address] [facility] [address],  [facility] [address]    Chantel Montgomery LICSW Lead Care Coordinator  Admissions 12/28/23     Phone: [phone]         Alecia Hanley CHW Community Health Worker  Admissions 1/31/24     Phone: [phone]         Dhruv Corea MD Assigned Pulmonology Provider   2/23/24     Phone: [phone] Fax: [phone]         [address] [facility] [address]                My Care Plans  Self Management and Treatment Plan    Care Plan  Care Plan: Community Resources       Problem: Lack of transportation               Problem: Unable to prepare meals               Problem: Reliable food source               Problem: Insufficient In-home support       Goal: Establish adequate home support       Start Date: 1/10/2024 Expected End Date: 7/10/2024    This Visit's Progress: 20% Recent Progress: 10%    Note:     Barriers: Mobility Issues  Strengths: Connected with Care Coordination  Patient expressed understanding of goal: Yes  Action steps to achieve this goal:  1. I will review housekeeping resource sent by [facility]  2. I will connect with this resource if I feel it will be helpful  3. I will remain in communication with Care Coordination about barriers I encounter or additional resources needed                             Care Plan: General       Problem: HP GENERAL PROBLEM       Goal: Obtain Replacement CPAP       Start Date: 1/10/2024 Expected End Date: 7/10/2024    This Visit's Progress: 10% Recent Progress: 10%    Note:     Barriers: Misplaced CPAP  Strengths: Connected with Care Coordination  Patient expressed understanding of goal: yes  Action steps to achieve this goal:  1. I will contact [facility] customer service to request a replacement machine  2. I will remain in communication with Care Coordination about additional resources needed or barriers I encounter                                Action Plans on File:                       Advance  Care Plans/Directives:   Advanced Care Plan/Directives on file: No data recorded  Discussed with patient/caregiver(s): No data recorded           My Medical and Care Information  Problem List   Patient Active Problem List   Diagnosis    Low back pain    Hyperlipidemia    Hypertension goal BP (blood pressure) < 140/90    Type 2 diabetes mellitus (H)    Advanced directives, counseling/discussion    Migraine headache    History of diverticulitis    MENTAL HEALTH    Generalized abdominal pain    Light chain (AL) amyloidosis (H)    Insomnia due to medical condition    Obstructive sleep apnea syndrome    Restless legs syndrome (RLS)    Leg edema    Morbid obesity (H)    Venous stasis dermatitis of both lower extremities    Polyneuropathy, unspecified    Acquired lymphedema    Low blood pressure reading    History of peripheral stem cell transplant (H)    Other specified anxiety disorders    Abnormal electrocardiography    Onychomycosis    Thrombocytopenia, unspecified (H24)    Bilateral leg weakness    NSTEMI (non-ST elevated myocardial infarction) (H)    Essential hypertension    Peripheral vascular disease (H24)    Other cirrhosis of liver (H)    Lymphedema    Right upper quadrant pain    Abnormal computed tomography scan    Benign neoplasm of cecum    Benign neoplasm of transverse colon    Benign prostatic hyperplasia    Bilateral cataracts    Cannabis dependence in remission (H)    Cardiovascular stress test abnormal    Chronic fatigue, unspecified    Continuous opioid dependence (H)    Contusion of rib    Coronary arteriosclerosis    DDD (degenerative disc disease), lumbar    Decreased testosterone level    Depressed bipolar I disorder in full remission (H24)    Chronic diarrhea    Hernia of anterior abdominal wall    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)    History of anaphylaxis due to severe acute respiratory syndrome coronavirus 2 (SARS-CoV-2) vaccine    History of colonic polyps     Hypertensive renal disease    Hypocalcemia    Immunoglobulin deficiency (H24)    Localized enlarged lymph nodes    Nonalcoholic steatohepatitis    Noninfectious gastroenteritis    Other muscle spasm    Polyp of colon    Presence of implanted infusion pump    Retention of urine    Spinal stenosis at L4-L5 level    Chronic pain    Abdominal pain, generalized    Nausea and vomiting, unspecified vomiting type      Current Medications and Allergies:  See printed Medication Report.    Care Coordination Start Date: 12/28/2023   Frequency of Care Coordination: monthly, more frequently as needed     Form Last Updated: 04/24/2024

## 2024-04-24 NOTE — PROGRESS NOTES
Clinic Care Coordination Contact  Care Coordination Clinician Chart Review    Situation: Patient chart reviewed by Care Coordinator.       Background: Care Coordination Program started: 12/28/2023. Initial assessment completed and patient-centered care plan(s) were developed with participation from patient. Lead CC handed patient off to CHW for continued outreaches.       Assessment: Per chart review, patient outreach completed by CC CHW on 3/27.  Patient is actively working to accomplish goal(s). Patient's goal(s) appropriate and relevant at this time. Patient is due for updated Plan of Care.  Assessments will be completed annually or as needed/with change of patient status.      Care Plan: Community Resources       Problem: Lack of transportation               Problem: Unable to prepare meals               Problem: Reliable food source               Problem: Insufficient In-home support       Goal: Establish adequate home support       Start Date: 1/10/2024 Expected End Date: 7/10/2024    This Visit's Progress: 20% Recent Progress: 10%    Note:     Barriers: Mobility Issues  Strengths: Connected with Care Coordination  Patient expressed understanding of goal: Yes  Action steps to achieve this goal:  1. I will review housekeeping resource sent by [facility]  2. I will connect with this resource if I feel it will be helpful  3. I will remain in communication with Care Coordination about barriers I encounter or additional resources needed                             Care Plan: General       Problem: HP GENERAL PROBLEM       Goal: Obtain Replacement CPAP       Start Date: 1/10/2024 Expected End Date: 7/10/2024    This Visit's Progress: 10% Recent Progress: 10%    Note:     Barriers: Misplaced CPAP  Strengths: Connected with Care Coordination  Patient expressed understanding of goal: yes  Action steps to achieve this goal:  1. I will contact [facility] [employer] service to request a replacement machine  2. I will remain in  communication with Care Coordination about additional resources needed or barriers I encounter                                   Plan/Recommendations: The patient will continue working with Care Coordination to achieve goal(s) as above. CHW will continue outreaches at minimum every 30 days and will involve Lead CC as needed or if patient is ready to move to Maintenance. Lead CC will continue to monitor CHW outreaches and patient's progress to goal(s) every 6 weeks.     Plan of Care updated and sent to patient: Yes, via Emiliana [facility] Management  [facility]  Phone: [phone]  E-mail: [email or web address]

## 2024-04-29 ENCOUNTER — PATIENT OUTREACH (OUTPATIENT)
Dept: CARE COORDINATION | Facility: CLINIC | Age: 69
End: 2024-04-29
Payer: COMMERCIAL

## 2024-04-29 NOTE — PROGRESS NOTES
Clinic Care Coordination Contact  Community Health Worker Follow Up    Care Gaps: Pt had Medicare Annual Wellness Visit on 4/2/24 with Dr. Poole.    Health Maintenance Due   Topic Date Due    CONTROLLED SUBSTANCE AGREEMENT FOR CHRONIC PAIN MANAGEMENT  Never done    RSV VACCINE (Pregnancy & 60+) (1 - 1-dose 60+ series) Never done    COLORECTAL CANCER SCREENING  08/01/2021    HEPATITIS A IMMUNIZATION (2 of 2 - Risk 2-dose series) 11/16/2022    COVID-19 Vaccine (6 - 2023-24 season) 11/29/2023    URINE DRUG SCREEN  04/17/2024       Care Plan:   Care Plan: Community Resources       Problem: Lack of transportation               Problem: Unable to prepare meals               Problem: Reliable food source               Problem: Insufficient In-home support       Goal: Establish adequate home support       Start Date: 1/10/2024 Expected End Date: 7/10/2024    This Visit's Progress: 20% Recent Progress: 10%    Note:     Barriers: Mobility Issues  Strengths: Connected with Care Coordination  Patient expressed understanding of goal: Yes  Action steps to achieve this goal:  1. I will review housekeeping resource sent by [facility]  2. I will connect with this resource if I feel it will be helpful  3. I will remain in communication with Care Coordination about barriers I encounter or additional resources needed                             Care Plan: General       Problem: HP GENERAL PROBLEM       Goal: Obtain Replacement CPAP       Start Date: 1/10/2024 Expected End Date: 7/10/2024    This Visit's Progress: 20% Recent Progress: 10%    Note:     Barriers: Misplaced CPAP  Strengths: Connected with Care Coordination  Patient expressed understanding of goal: yes  Action steps to achieve this goal:  1. I will contact [employer] ([phone]) to request a replacement machine  2. I will remain in communication with Care Coordination about additional resources needed or barriers I encounter                                 Intervention and Education during outreach:   Resources for housekeeping and [employer] customer service - pt states he has not received any information. Pt got on his [employer] account during our conversation to look for these resources. After and extensive search, pt able to look in letter section to see CHW's 3/28/24 letter with the following resources. Reviewed these resources together. Pt will call out on them soon.    Senior Linkage Line  [place]://[place].gov[place]senior-linkage-line/  [phone]  Senior [employer] Line is a great resource for Seniors in [place]! As part of their support services, they would have resources for homemaker/chore services to help with housekeeping and preparing meals, or chores like mowing the lawn and shoveling snow.     [facility] [employer] [facility]  [email or web address]  0-243-166-9436  The care of your home and your personal comfort is extremely important to us. We want to be sure your life is as stress-free as possible. Keeping your home looking beautiful at all hours will help alleviate your anxiety and tension. We will always be sure to remove any clutter and help you to organize your belongings in an easy-to-find system so everything has a place and you always know right where to find things.     [employer]  [email or web address]  [phone]  When tidying up becomes too much of a taxing chore, our caregivers can step in and manage the upkeep. They can assist with tasks, such as laundry, folding linens, light cleaning, dusting, sweeping, vacuuming and more. Our caregivers make it easy to keep your house feeling like home.    Reviewed upcoming May appointments. Pt knew 3/5, but needed to be reminded about MATHEUS Perla, visit on 5/23/24 and return wound visit on 5/29/24. Pt thought the wound visits were at [facility] on [address], however, CHW told pt those wound appointments  were at [address], at the [facility] [place].  [employer] customer service [phone] - pt has not called on getting a replacement CPAP. Pt wrote down [employer] customer service number this afternoon and states he will call after our conversation.  Colonoscopy - scheduled for 5/20/24. Pt is aware.  Back surgery - pt states he is scheduled for back surgery in [place], [place], on 5/31/24.   CHW asks if pt has any further concerns or need for resources as this time. Pt states he does not. CHW made sure pt has CHW contact information. Pt will contact CHW with questions or updates before next outreach.      CHW Plan: CHW will follow up with pt in one month.    Alecia Hanley  Community Health Worker  [facility]  [phone]

## 2024-05-06 ENCOUNTER — OFFICE VISIT (OUTPATIENT)
Dept: FAMILY MEDICINE | Facility: CLINIC | Age: 69
End: 2024-05-06
Payer: COMMERCIAL

## 2024-05-06 VITALS
BODY MASS INDEX: 41.14 KG/M2 | OXYGEN SATURATION: 95 % | WEIGHT: 256 LBS | HEIGHT: 66 IN | RESPIRATION RATE: 16 BRPM | SYSTOLIC BLOOD PRESSURE: 102 MMHG | TEMPERATURE: 97.7 F | HEART RATE: 84 BPM | DIASTOLIC BLOOD PRESSURE: 70 MMHG

## 2024-05-06 DIAGNOSIS — E66.01 MORBID OBESITY (H): ICD-10-CM

## 2024-05-06 DIAGNOSIS — L40.9 PSORIASIS: ICD-10-CM

## 2024-05-06 DIAGNOSIS — E11.69 TYPE 2 DIABETES MELLITUS WITH OTHER SPECIFIED COMPLICATION, WITHOUT LONG-TERM CURRENT USE OF INSULIN (H): ICD-10-CM

## 2024-05-06 DIAGNOSIS — F31.76 DEPRESSED BIPOLAR I DISORDER IN FULL REMISSION (H): ICD-10-CM

## 2024-05-06 DIAGNOSIS — Z01.818 PREOP GENERAL PHYSICAL EXAM: Primary | ICD-10-CM

## 2024-05-06 DIAGNOSIS — I10 ESSENTIAL HYPERTENSION: ICD-10-CM

## 2024-05-06 DIAGNOSIS — M48.061 SPINAL STENOSIS OF LUMBAR REGION, UNSPECIFIED WHETHER NEUROGENIC CLAUDICATION PRESENT: ICD-10-CM

## 2024-05-06 DIAGNOSIS — Z29.11 NEED FOR VACCINATION AGAINST RESPIRATORY SYNCYTIAL VIRUS: ICD-10-CM

## 2024-05-06 DIAGNOSIS — G47.33 OBSTRUCTIVE SLEEP APNEA SYNDROME: ICD-10-CM

## 2024-05-06 DIAGNOSIS — E78.5 HYPERLIPIDEMIA LDL GOAL <100: ICD-10-CM

## 2024-05-06 DIAGNOSIS — R60.0 BILATERAL LOWER EXTREMITY EDEMA: ICD-10-CM

## 2024-05-06 LAB
ERYTHROCYTE [DISTWIDTH] IN BLOOD BY AUTOMATED COUNT: 13.7 % (ref 10–15)
HCT VFR BLD AUTO: 37.3 % (ref 40–53)
HGB BLD-MCNC: 13.1 G/DL (ref 13.3–17.7)
MCH RBC QN AUTO: 33.3 PG (ref 26.5–33)
MCHC RBC AUTO-ENTMCNC: 35.1 G/DL (ref 31.5–36.5)
MCV RBC AUTO: 95 FL (ref 78–100)
PLATELET # BLD AUTO: 143 10E3/UL (ref 150–450)
RBC # BLD AUTO: 3.93 10E6/UL (ref 4.4–5.9)
WBC # BLD AUTO: 9 10E3/UL (ref 4–11)

## 2024-05-06 PROCEDURE — 99215 OFFICE O/P EST HI 40 MIN: CPT | Mod: 25 | Performed by: FAMILY MEDICINE

## 2024-05-06 PROCEDURE — 36415 COLL VENOUS BLD VENIPUNCTURE: CPT | Performed by: FAMILY MEDICINE

## 2024-05-06 PROCEDURE — 85027 COMPLETE CBC AUTOMATED: CPT | Performed by: FAMILY MEDICINE

## 2024-05-06 PROCEDURE — 90480 ADMN SARSCOV2 VAC 1/ONLY CMP: CPT | Performed by: FAMILY MEDICINE

## 2024-05-06 PROCEDURE — 80053 COMPREHEN METABOLIC PANEL: CPT | Performed by: FAMILY MEDICINE

## 2024-05-06 PROCEDURE — 93000 ELECTROCARDIOGRAM COMPLETE: CPT | Performed by: FAMILY MEDICINE

## 2024-05-06 PROCEDURE — 91320 SARSCV2 VAC 30MCG TRS-SUC IM: CPT | Performed by: FAMILY MEDICINE

## 2024-05-06 RX ORDER — RESPIRATORY SYNCYTIAL VIRUS VACCINE 120MCG/0.5
0.5 KIT INTRAMUSCULAR ONCE
Qty: 1 EACH | Refills: 0 | Status: SHIPPED | OUTPATIENT
Start: 2024-05-06 | End: 2024-05-06

## 2024-05-06 ASSESSMENT — PATIENT HEALTH QUESTIONNAIRE - PHQ9
SUM OF ALL RESPONSES TO PHQ QUESTIONS 1-9: 8
SUM OF ALL RESPONSES TO PHQ QUESTIONS 1-9: 8
10. IF YOU CHECKED OFF ANY PROBLEMS, HOW DIFFICULT HAVE THESE PROBLEMS MADE IT FOR YOU TO DO YOUR WORK, TAKE CARE OF THINGS AT HOME, OR GET ALONG WITH OTHER PEOPLE: SOMEWHAT DIFFICULT

## 2024-05-06 ASSESSMENT — ANXIETY QUESTIONNAIRES
4. TROUBLE RELAXING: SEVERAL DAYS
3. WORRYING TOO MUCH ABOUT DIFFERENT THINGS: SEVERAL DAYS
GAD7 TOTAL SCORE: 5
1. FEELING NERVOUS, ANXIOUS, OR ON EDGE: SEVERAL DAYS
5. BEING SO RESTLESS THAT IT IS HARD TO SIT STILL: NOT AT ALL
8. IF YOU CHECKED OFF ANY PROBLEMS, HOW DIFFICULT HAVE THESE MADE IT FOR YOU TO DO YOUR WORK, TAKE CARE OF THINGS AT HOME, OR GET ALONG WITH OTHER PEOPLE?: SOMEWHAT DIFFICULT
GAD7 TOTAL SCORE: 5
IF YOU CHECKED OFF ANY PROBLEMS ON THIS QUESTIONNAIRE, HOW DIFFICULT HAVE THESE PROBLEMS MADE IT FOR YOU TO DO YOUR WORK, TAKE CARE OF THINGS AT HOME, OR GET ALONG WITH OTHER PEOPLE: SOMEWHAT DIFFICULT
7. FEELING AFRAID AS IF SOMETHING AWFUL MIGHT HAPPEN: SEVERAL DAYS
2. NOT BEING ABLE TO STOP OR CONTROL WORRYING: SEVERAL DAYS
GAD7 TOTAL SCORE: 5
6. BECOMING EASILY ANNOYED OR IRRITABLE: NOT AT ALL
7. FEELING AFRAID AS IF SOMETHING AWFUL MIGHT HAPPEN: SEVERAL DAYS

## 2024-05-06 ASSESSMENT — PAIN SCALES - GENERAL: PAINLEVEL: MILD PAIN (3)

## 2024-05-06 NOTE — PROGRESS NOTES
Preoperative Evaluation  [facility]  [address], [address]  [facility] [address]  Phone: [phone]  Primary Provider: Sherwin Mejia  Pre-op Performing Provider: SHERWIN MEJIA  May 6, 2024       Erickson is a 68 year old, presenting for the following:  Pre-Op Exam (5/31/24 LS Fusion)      Surgical Information  Surgery/Procedure: LS Spine fusion  Surgery Location: [facility]  Surgeon:   Surgery Date: 5/31/24  Time of Surgery: TBD  Where patient plans to recover: At home with family  Fax number for surgical facility: [phone]    Assessment & Plan     The proposed surgical procedure is considered INTERMEDIATE risk.    Preop general physical exam  We checked an EKG in clinic today and I personally reviewed the tracing with him and provided an initial interpretation.  The EKG showed sinus rhythm and evidence of a right bundle branch block, anterior fascicular block and an old inferior infarct.  The results are similar compared to the EKG from 5/25/2023.  He is cleared to proceed with surgery as scheduled.  - EKG 12-lead complete w/read - Clinics    Spinal stenosis of lumbar region, unspecified whether neurogenic claudication present  - CBC with platelets; Future  - CBC with platelets    Essential hypertension  Blood pressure stable on current medications.  - Comprehensive metabolic panel (BMP + Alb, Alk Phos, ALT, AST, Total. Bili, TP); Future  - Comprehensive metabolic panel (BMP + Alb, Alk Phos, ALT, AST, Total. Bili, TP)    Morbid obesity (H)  He will continue to work on lifestyle modifications help with weight loss.  He is also on Ozempic.    Type 2 diabetes mellitus with other specified complication, without long-term current use of insulin (H)  His glucose numbers seem to be improving since being on his diabetes medications more consistently.  He is going to decrease the Lantus dose to 40 units the night before surgery then  resume at 54 units after surgery.  He is also going to hold metformin on the morning of surgery.    Bilateral lower extremity edema  Currently on furosemide.  He is going to hold this on the day of surgery.    Depressed bipolar I disorder in full remission (H24)  Stable on Vraylar, Wellbutrin and Cymbalta.    Hyperlipidemia LDL goal <100  Stable on atorvastatin.  His triglycerides have been quite high, but these should improve as his A1c comes down.    Obstructive sleep apnea syndrome  He is still off CPAP and working on getting this through a supplier.    Psoriasis  This issue has improved since starting Skyrizi.  He is following closely with his dermatologist.    Need for vaccination against respiratory syncytial virus  - respiratory syncytial virus vaccine, bivalent (ABRYSVO) injection; Inject 0.5 mLs into the muscle once for 1 dose       Implanted Device   - Type of device: Pain pump Patient advised to bring device information on day of surgery.       - No identified additional risk factors other than previously addressed    Antiplatelet or Anticoagulation Medication Instructions   - aspirin: Discontinue aspirin 7-10 days prior to procedure to reduce bleeding risk. It should be resumed postoperatively.     Additional Medication Instructions    - Take all of your medications before surgery except as noted below  - HOLD (do not take) your FUROSEMIDE and HYDROCHLOROTHIAZIDE on the morning of surgery.   - HOLD (do not take) your METFORMIN on the morning of surgery.  - HOLD (do not take) Aspirin for 7 days prior to surgery  - Decrease Lantus to 40 Units on night before surgery. Then resume regular dose (54 iunits) the night after surgery.    Recommendation  APPROVAL GIVEN to proceed with proposed procedure, without further diagnostic evaluation.      45 minutes spent by me on the date of the encounter doing chart review, review of test results, interpretation of tests, patient visit, and documentation     Subjective        HPI related to upcoming procedure:     The patient has undergone extensive non-surgical treatment, including a microdiscectomy that was unsuccessful. A recent myelogram confirms L5-S1 disc disease and sacroiliac joint issues. The current plan is for the patient to ensure they have completed an adequate course of non-surgical treatment, and if so, they will be considered for surgical intervention of the L5-S1 disc.     He has a complex medical history that includes peripheral vascular disease, peripheral neuropathy, coronary artery disease, history of NSTEMI with stent placement, amyloidosis, anxiety, depression, psoriasis, bipolar 1 disorder, hypertension, diabetes mellitus type 2, hyperlipidemia, MEJIAS with liver cirrhosis, plaque psoriasis, restless leg syndrome, polyneuropathy, obstructive sleep apnea not using CPAP not currently using CPAP, chronic pain with pain pump containing morphine and fentanyl.      On 3/21/2024 his hemoglobin A1c was 10.9%, creatinine 1.18, glucose 507, triglycerides 602 and HDL 23.  Since that time he has increased the Lantus to 50 units daily and has been able to start Ozempic.  He is also on metformin XR 1000 mg twice daily.         4/29/2024     8:55 AM   Preop Questions   1. Have you ever had a heart attack or stroke? No   2. Have you ever had surgery on your heart or blood vessels, such as a stent placement, a coronary artery bypass, or surgery on an artery in your head, neck, heart, or legs? YES    3. Do you have chest pain with activity? No   4. Do you have a history of  heart failure? No   5. Do you currently have a cold, bronchitis or symptoms of other infection? No   6. Do you have a cough, shortness of breath, or wheezing? No   7. Do you or anyone in your family have previous history of blood clots? No   8. Do you or does anyone in your family have a serious bleeding problem such as prolonged bleeding following surgeries or cuts? No   9. Have you ever had problems with  anemia or been told to take iron pills? No   10. Have you had any abnormal blood loss such as black, tarry or bloody stools? No   11. Have you ever had a blood transfusion? No   12. Are you willing to have a blood transfusion if it is medically needed before, during, or after your surgery? Yes   13. Have you or any of your relatives ever had problems with anesthesia? No   14. Do you have sleep apnea, excessive snoring or daytime drowsiness? YES    14a. Do you have a CPAP machine? No   15. Do you have any artifical heart valves or other implanted medical devices like a pacemaker, defibrillator, or continuous glucose monitor? YES    15a. What type of device do you have? Freestyle nery 3   15b. Name of the clinic that manages your device:  [facility]   16. Do you have artificial joints? No   17. Are you allergic to latex? No       Preoperative Review of    reviewed - controlled substances reflected in medication list.          Patient Active Problem List    Diagnosis Date Noted    Abdominal pain, generalized 05/30/2023     Priority: Medium    Nausea and vomiting, unspecified vomiting type 05/30/2023     Priority: Medium    Lymphedema 03/12/2023     Priority: Medium    Benign prostatic hyperplasia 11/28/2022     Priority: Medium    Bilateral cataracts 11/28/2022     Priority: Medium    Cardiovascular stress test abnormal 11/28/2022     Priority: Medium    Contusion of rib 11/28/2022     Priority: Medium    Coronary arteriosclerosis 11/28/2022     Priority: Medium    Decreased testosterone level 11/28/2022     Priority: Medium    Chronic diarrhea 11/28/2022     Priority: Medium    Hernia of anterior abdominal wall 11/28/2022     Priority: Medium    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) 11/28/2022     Priority: Medium    History of anaphylaxis due to severe acute respiratory syndrome coronavirus 2 (SARS-CoV-2) vaccine 11/28/2022     Priority: Medium    Hypertensive renal disease  11/28/2022     Priority: Medium    Hypocalcemia 11/28/2022     Priority: Medium    Chronic pain 11/28/2022     Priority: Medium    Peripheral vascular disease (H24) 02/08/2021     Priority: Medium    Chronic fatigue, unspecified 11/07/2020     Priority: Medium    Bilateral leg weakness 10/20/2020     Priority: Medium    Continuous opioid dependence (H) 08/06/2020     Priority: Medium    DDD (degenerative disc disease), lumbar 08/06/2020     Priority: Medium    Presence of implanted infusion pump 08/06/2020     Priority: Medium     Formatting of this note might be different from the original.  Bupivacaine, morphine and fentanyl; follows with Dr. Shaw at [facility]  Formatting of this note might be different from the original.  Formatting of this note might be different from the original.  Bupivacaine, morphine and fentanyl; follows with Dr. Shaw at [facility]  Formatting of this note might be different from the original.  Formatting of this note might be different from the original.  Bupivacaine, morphine and fentanyl; follows with Dr. Shaw at [facility]  Formatting of this note might be different from the original.  Formatting of this note might be different from the original.  Bupivacaine, morphine and fentanyl; follows with Dr. Shaw at [facility]      Spinal stenosis at L4-L5 level 08/05/2020     Priority: Medium    Thrombocytopenia, unspecified (H24) 08/03/2020     Priority: Medium    Onychomycosis 06/29/2020     Priority: Medium    Nonalcoholic steatohepatitis 05/05/2020     Priority: Medium    Other cirrhosis of liver (H) 04/15/2020     Priority: Medium    Low blood pressure reading 05/16/2019     Priority: Medium    Polyneuropathy, unspecified 02/04/2019     Priority: Medium    Acquired lymphedema 02/04/2019     Priority: Medium    Venous stasis dermatitis of both lower extremities 12/31/2018     Priority: Medium    Retention of urine 12/24/2018     Priority: Medium    Morbid obesity (H) 08/28/2018     Priority: Medium     Benign neoplasm of cecum 08/03/2018     Priority: Medium    Benign neoplasm of transverse colon 08/03/2018     Priority: Medium    History of colonic polyps 08/01/2018     Priority: Medium     Formatting of this note might be different from the original.  Overview:   1/15-rxed with antibiotics  Formatting of this note might be different from the original.  Formatting of this note might be different from the original.  Overview:   1/15-rxed with antibiotics  Formatting of this note might be different from the original.  Formatting of this note might be different from the original.  Overview:   1/15-rxed with antibiotics      Polyp of colon 08/01/2018     Priority: Medium    Leg edema 05/20/2018     Priority: Medium     Noncardiac  Continue with  furosemide (LASIX) 20 MG tablet,   potassium       chloride SA (K-DUR/KLOR-CON M) 10 MEQ CR  Tab once daily   Basic metabolic panel      NSTEMI (non-ST elevated myocardial infarction) (H) 04/07/2018     Priority: Medium    Right upper quadrant pain 09/05/2017     Priority: Medium    Restless legs syndrome (RLS) 06/29/2017     Priority: Medium    History of peripheral stem cell transplant (H) 12/16/2016     Priority: Medium    Cannabis dependence in remission (H) 10/27/2016     Priority: Medium    Depressed bipolar I disorder in full remission (H24) 10/27/2016     Priority: Medium    Insomnia due to medical condition 08/23/2016     Priority: Medium    Light chain (AL) amyloidosis (H) 06/01/2016     Priority: Medium      #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 7/3/2018      Dr [id number]      Localized enlarged lymph nodes 04/28/2016     Priority: Medium     Formatting of this note might be different from the original.  Portacaval nodes up to 3.5 cm in diameter on abd CT 4/28/2016  Formatting of this note might be different from the original.  Formatting of this note might be different from the  original.  Portacaval nodes up to 3.5 cm in diameter on abd CT 4/28/2016      Generalized abdominal pain 03/15/2016     Priority: Medium     Formatting of this note might be different from the original.  Overview:   Patient is followed by RAYMOND FUNG for ongoing prescription of pain medication.  All refills should be approved by this provider, or covering partner.    Medication(s): Norco  .   Maximum quantity per month:   Clinic visit frequency required:      Controlled substance agreement on file: No    Pain Clinic evaluation in the past:      DIRE Total Score(s):  No flowsheet data found.    Last [facility] website verification:  Done on 7/26/16   [email or web address]      Abnormal electrocardiography 03/15/2016     Priority: Medium    MENTAL HEALTH 08/17/2015     Priority: Medium    Immunoglobulin deficiency (H24) 08/03/2015     Priority: Medium    Other muscle spasm 07/13/2015     Priority: Medium    Abnormal computed tomography scan 06/18/2015     Priority: Medium    Noninfectious gastroenteritis 06/09/2015     Priority: Medium    History of diverticulitis 01/27/2015     Priority: Medium     1/15-rxed with antibiotics  Formatting of this note might be different from the original.  Formatting of this note might be different from the original.  1/15-rxed with antibiotics  Formatting of this note might be different from the original.  Formatting of this note might be different from the original.  1/15-rxed with antibiotics      Obstructive sleep apnea syndrome 12/19/2013     Priority: Medium     sleep study  Formatting of this note might be different from the original.  Overview:   sleep study  Formatting of this note might be different from the original.  Formatting of this note might be different from the original.  Overview:   sleep study  Formatting of this note might be different from the original.  sleep study  Formatting of this note might be different from the original.  Formatting of this note  might be different from the original.  Overview:   sleep study  Formatting of this note might be different from the original.  sleep study  Formatting of this note might be different from the original.  Formatting of this note might be different from the original.  Overview:   sleep study  Formatting of this note might be different from the original.  sleep study      Other specified anxiety disorders 12/18/2013     Priority: Medium    Advanced directives, counseling/discussion 05/29/2012     Priority: Medium     Discussed advance care planning with patient; information given to patient to review. 5/29/2012     Honoring choices letter mailed. 7-  Lynsey Bustamante, RT (R)        Migraine headache 05/29/2012     Priority: Medium    Hypertension goal BP (blood pressure) < 140/90 10/11/2011     Priority: Medium    Type 2 diabetes mellitus (H) 10/11/2011     Priority: Medium    Essential hypertension 10/11/2011     Priority: Medium    Hyperlipidemia 10/31/2010     Priority: Medium    Low back pain 06/17/2008     Priority: Medium      Past Medical History:   Diagnosis Date    Acquired lymphedema 02/04/2019    Allergic state     Amyloidosis (H)     followed by hematology Dr. Romeo status post peripheral stem cell transplant    Anxiety 05/11/2016    Anxiety and depression 12/18/2013    Bipolar I disorder (H) 09/01/2015    Under care of psychiatrist P- Dr Rahman doxepin 25 mg, 2 cap bedtime DULoxetine 20 mg once daily  OLANZapine 7.5 mg  1 tab bedtime     Cerebral infarction, unspecified (H) 07/13/2015    DDD (degenerative disc disease), lumbar 08/06/2020    Depressive disorder 1995    EKG abnormality 04/20/2020    H/O bone marrow transplant (H)     Headache(784.0)     History of diverticulitis 01/27/2015    1/15-rxed with antibiotics     Hyperlipidemia LDL goal <100 10/31/2010    Hypertension 2007    Hypertension goal BP (blood pressure) < 140/90 10/11/2011    Marianne (H) 08/20/2015    Morbid obesity (H) 08/28/2018     Myalgia and myositis, unspecified     Narcotic dependence (H) 09/06/2016    None in about 6 months- 8/1/17    NSTEMI (non-ST elevated myocardial infarction) (H) 04/19/2020    OCD (obsessive compulsive disorder)     Other acquired absence of organ 1994    Other cirrhosis of liver (H) possibly from vences  04/15/2020    Other specified viral warts     Peripheral edema 05/20/2018    Noncardiac Continue with  furosemide (LASIX) 20 MG tablet,  potassium       chloride SA (K-DUR/KLOR-CON M) 10 MEQ CR  Tab once daily  Basic metabolic panel    Polyneuropathy associated with underlying disease (H24) 02/04/2019    Restless legs syndrome (RLS) 06/29/2017    Stasis dermatitis of both legs 12/31/2018    Type 2 diabetes mellitus with other specified complication (H) 07/10/2017     Past Surgical History:   Procedure Laterality Date    ABDOMEN SURGERY  2007    abdominal hernia    BACK SURGERY  8/6/2020    BIOPSY  june 2015    negative    BMT PROTOCOL      for systemic amyloidosis    BONE MARROW BIOPSY, BONE SPECIMEN, NEEDLE/TROCAR N/A 6/8/2016    Procedure: BIOPSY BONE MARROW;  Surgeon: Nathan Agrawal MD;  Location:  GI    BONE MARROW BIOPSY, BONE SPECIMEN, NEEDLE/TROCAR N/A 2/20/2019    Procedure: BIOPSY BONE MARROW;  Surgeon: Michael Raygoza MD;  Location:  GI    CHOLECYSTECTOMY  1995    lap qian    COLONOSCOPY  2012    hx polyps    ESOPHAGOSCOPY, GASTROSCOPY, DUODENOSCOPY (EGD), COMBINED N/A 5/29/2015    Procedure: COMBINED ESOPHAGOSCOPY, GASTROSCOPY, DUODENOSCOPY (EGD), BIOPSY SINGLE OR MULTIPLE;  Surgeon: John Jacob MD;  Location:  GI    HERNIA REPAIR, UMBILICAL  2006    [facility] NONSPECIFIC PROCEDURE  94    Cholecystectomy    [facility] NONSPECIFIC PROCEDURE  2000    repair deviated septum     Current Outpatient Medications   Medication Sig Dispense Refill    acetaminophen (TYLENOL) 500 MG tablet Take 1,000 mg by mouth every 8 hours as needed      albuterol (PROAIR HFA/PROVENTIL HFA/VENTOLIN HFA)  108 (90 Base) MCG/ACT inhaler Inhale 2 puffs into the lungs every 6 hours as needed for shortness of breath, wheezing or cough 18 g 0    aspirin (ASPIRIN LOW DOSE) 81 MG tablet Take 1 tablet (81 mg) by mouth daily 30 tablet 3    atorvastatin (LIPITOR) 20 MG tablet Take 1.5 tablets (30 mg) by mouth daily 135 tablet 3    Bioflavonoid Products (CANDIDO-C) TABS Take 1,000 mg by mouth daily 90 tablet 3    bisacodyl (DULCOLAX) 5 MG EC tablet Two days prior to exam take two (2) tablets at 4pm. One day prior to exam take two (2) tablets at 4pm (Patient not taking: Reported on 4/2/2024) 4 tablet 0    buPROPion (WELLBUTRIN XL) 150 MG 24 hr tablet Take 1 tablet (150 mg) by mouth every morning 90 tablet 3    calcium carbonate (OS-LUIS) 1500 (600 Ca) MG tablet Take 600 mg by mouth daily      cariprazine (VRAYLAR) 4.5 MG capsule Take 1 capsule (4.5 mg) by mouth daily 90 capsule 3    Continuous Blood Gluc  (FREESTYLE VERA 3 READER) MARAL 1 each every 14 days To test blood sugar 1 each 0    DULoxetine (CYMBALTA) 30 MG capsule Take 1 capsule (30 mg) by mouth 2 times daily 180 capsule 3    Folic Acid-Vit B6-Vit B12 0.5-5-0.2 MG TABS Take 1 tablet by mouth daily 90 tablet 3    furosemide (LASIX) 20 MG tablet Take 2 tablets (40 mg) daily x 7 days. Then take 1  tablet (20 mg) daily 90 tablet 0    glucose 40 % (400 mg/mL) gel Take by mouth every 15 minutes as needed for low blood sugar      hydrochlorothiazide (HYDRODIURIL) 25 MG tablet Take 1.5 tablets (37.5 mg) by mouth daily 135 tablet 3    insulin glargine (LANTUS PEN) 100 UNIT/ML pen Inject 54 Units Subcutaneous at bedtime 15 mL 0    insulin pen needle (31G X 8 MM) 31G X 8 MM miscellaneous Use one pen needles daily or as directed. 100 each 1    loperamide (IMODIUM) 2 MG capsule Take 1 capsule (2 mg) by mouth 4 times daily as needed for diarrhea      losartan (COZAAR) 100 MG tablet Take 1 tablet (100 mg) by mouth daily 90 tablet 3    medication given by implanted intrathecal  pump continuous Drug # 1: Fentanyl (Sublimaze) - Conc: 2000 mcg/mL - Total Dose / 24 hours: 1663.3 mcg  Drug # 2: Bupivacaine (Marcaine)  - Conc: 20 mg/mL - Total Dose / 24 hours: 16.633 mg  Drug # 3: Morphine (Duramorph or Infumorph)  - Conc: 9.6 mg/mL - Total Dose / 24 hours: 7.9836 mg    Pump Reservoir Volume: 40 mL  Pump Reservoir Alarm Volume: 2 ml  Outside Clinic & Provider: Dr. Pawan Shaw [facility]  Last Refill Date: 12/21/23  Next Refill Date: 1/28/24  [place] Alarm Date:  Pump : [employer] SynchroMed II    Boluses: Up to 3 per day, 4 minute duration. Lock-out every 6 hours (also has dose restriction interval noted 1 per 8 hours)   Fentanyl: 110.1 mcg/dose  Bupivacaine: 1.101 mg/dose  Morphine: 0.5287 mg/dose      metFORMIN (GLUCOPHAGE XR) 500 MG 24 hr tablet Take 2 tablets (1,000 mg) by mouth 2 times daily (with meals) 360 tablet 3    metoprolol succinate ER (TOPROL XL) 100 MG 24 hr tablet Take 1 tablet (100 mg) by mouth daily 90 tablet 3    modafinil (PROVIGIL) 200 MG tablet Take 2 tablets (400 mg) by mouth daily Pt has increased his dose to 400mg daily from 300mg daily.  Has a message in to his physician for a dose increase. 60 tablet 2    mometasone (ELOCON) 0.1 % external cream Apply topically daily as needed For psoriasis on face      multivitamin w/minerals (THERA-VIT-M) tablet Take 1 tablet by mouth daily Men's 50+      nitroGLYcerin (NITROSTAT) 0.4 MG sublingual tablet Place 1 tablet (0.4 mg) under the tongue every 5 minutes as needed for chest pain For chest pain place 1 tablet under the tongue every 5 minutes for 3 doses. If symptoms persist 5 minutes after 1st dose call 911. 30 tablet 1    polyethylene glycol (GOLYTELY) 236 g suspension Take as directed. Two days before your exam fill the first container with water. Cover and shake until mixed well. At 5:00pm drink one 8oz glass every 10-15 minutes until half (1/2) of the first container is empty. Store the remainder  in the refrigerator. One day before your exam at 5:00pm drink the second half of the first container until it is gone. Before you go to bed mix the second container with water and put in refrigerator. Six hours before your check in time drink one 8oz glass every 10-15 minutes until half of container is empty. Discard the remainder of solution. (Patient not taking: Reported on 4/2/2024) 8000 mL 0    POTASSIUM PO Take 1 tablet by mouth daily Unspecified tablet strength; patient estimated 600 mg, takes for leg cramps      pregabalin (LYRICA) 100 MG capsule Take 1 capsule (100 mg) by mouth at bedtime      semaglutide (OZEMPIC) 2 MG/3ML pen Inject 0.5 mg Subcutaneous every 7 days 3 mL 2    senna-docusate (SENOKOT-S/PERICOLACE) 8.6-50 MG tablet Take 1-2 tablets by mouth 2 times daily as needed      SKYRIZI  MG/ML subcutaneous Inject 150 mg Subcutaneous once      SUMAtriptan (IMITREX) 50 MG tablet Take 1 tablet (50 mg) by mouth at onset of headache for migraine May repeat in 2 hours. Max 4 tablets/24 hours. 8 tablet 1    tamsulosin (FLOMAX) 0.4 MG capsule Take 1 capsule (0.4 mg) by mouth 2 times daily 180 capsule 3    testosterone (ANDROGEL/TESTIM) 50 MG/5GM (1%) topical gel Place 1 packet (50 mg of testosterone) onto the skin daily (Patient not taking: Reported on 4/2/2024) 30 packet 1    vitamin B-Complex Take 3 tablets by mouth daily      vitamin D3 (CHOLECALCIFEROL) 50 mcg (2000 units) tablet Take 1 tablet (50 mcg) by mouth daily 90 tablet 3       Allergies   Allergen Reactions    Amoxicillin Diarrhea    Cephalexin Diarrhea    Liraglutide Other (See Comments), Headache and Unknown     Other reaction(s): Headache, Unknown  PN: migraines - Increased migraine frequency and severity      Sulfa Antibiotics Angioedema and Swelling     Pt has taken  Taken sulfa drugs orally without trouble. He had problems with Sulfa eye drops. Eye swelled up          Social History     Tobacco Use    Smoking status: Never    Smokeless  "tobacco: Never   Substance Use Topics    Alcohol use: Not Currently     Alcohol/week: 0.0 standard drinks of alcohol       History   Drug Use Unknown         Review of Systems    Review of Systems  Constitutional, HEENT, cardiovascular, pulmonary, GI, , musculoskeletal, neuro, skin, endocrine and psych systems are negative, except as otherwise noted.    Objective    /70   Pulse 84   Temp 97.7  F (36.5  C) (Temporal)   Resp 16   Ht 1.676 m (5' 6\")   Wt 116.1 kg (256 lb)   SpO2 95%   BMI 41.32 kg/m     Estimated body mass index is 41.32 kg/m  as calculated from the following:    Height as of this encounter: 1.676 m (5' 6\").    Weight as of this encounter: 116.1 kg (256 lb).  Physical Exam  GENERAL: alert and no distress  EYES: Eyes grossly normal to inspection, PERRL and conjunctivae and sclerae normal  HENT:  nose and mouth without ulcers or lesions  NECK: no adenopathy, no asymmetry, masses, or scars  RESP: lungs clear to auscultation - no rales, rhonchi or wheezes  CV: regular rate and rhythm, normal S1 S2, no S3 or S4, no murmur, click or rub, no peripheral edema  ABDOMEN: soft, nontender, no hepatosplenomegaly, no masses and bowel sounds normal  MS: no gross musculoskeletal defects noted, no edema  SKIN: no suspicious lesions or rashes  NEURO: Normal strength and tone, mentation intact and speech normal  PSYCH: mentation appears normal, affect normal/bright    Recent Labs   Lab Test 03/21/24  1104 12/25/23  2320 12/25/23  0747 12/24/23  1442 09/26/23  2131 09/18/23  1629   HGB  --   --  12.7* 13.1*   < > 13.9   PLT  --   --  127* 153   < > 121*   * 133* 137 134*   < > 135*   POTASSIUM 4.4 4.1 4.0 4.4   < > 4.1   CR 1.18* 0.91 1.07 1.67*   < > 0.84   A1C 10.9*  --   --   --   --  9.8*    < > = values in this interval not displayed.        Diagnostics  Recent Results (from the past 48 hour(s))   CBC with platelets    Collection Time: 05/06/24  2:51 PM   Result Value Ref Range    WBC Count 9.0 " 4.0 - 11.0 10e3/uL    RBC Count 3.93 (L) 4.40 - 5.90 10e6/uL    Hemoglobin 13.1 (L) 13.3 - 17.7 g/dL    Hematocrit 37.3 (L) 40.0 - 53.0 %    MCV 95 78 - 100 fL    MCH 33.3 (H) 26.5 - 33.0 pg    MCHC 35.1 31.5 - 36.5 g/dL    RDW 13.7 10.0 - 15.0 %    Platelet Count 143 (L) 150 - 450 10e3/uL   Comprehensive metabolic panel (BMP + Alb, Alk Phos, ALT, AST, Total. Bili, TP)    Collection Time: 05/06/24  2:51 PM   Result Value Ref Range    Sodium 134 (L) 135 - 145 mmol/L    Potassium 3.9 3.4 - 5.3 mmol/L    Carbon Dioxide (CO2) 28 22 - 29 mmol/L    Anion Gap 12 7 - 15 mmol/L    Urea Nitrogen 21.0 8.0 - 23.0 mg/dL    Creatinine 1.05 0.67 - 1.17 mg/dL    GFR Estimate 78 >60 mL/min/1.73m2    Calcium 9.3 8.8 - 10.2 mg/dL    Chloride 94 (L) 98 - 107 mmol/L    Glucose 247 (H) 70 - 99 mg/dL    Alkaline Phosphatase 100 40 - 150 U/L    AST 48 (H) 0 - 45 U/L    ALT 65 0 - 70 U/L    Protein Total 6.3 (L) 6.4 - 8.3 g/dL    Albumin 4.1 3.5 - 5.2 g/dL    Bilirubin Total 0.6 <=1.2 mg/dL          Revised Cardiac Risk Index (RCRI)  The patient has the following serious cardiovascular risks for perioperative complications:   - Coronary Artery Disease (MI, positive stress test, angina, Qs on EKG) = 1 point   - Diabetes Mellitus (on Insulin) = 1 point     RCRI Interpretation: 2 points: Class III (moderate risk - 6.6% complication rate)     Estimated Functional Capacity: Performs 4 METS exercise without symptoms (e.g., light housework, stairs, 4 mph walk, 7 mph bike, slow step dance)           Signed Electronically by: Sherwin Mcdermott DO  Copy of this evaluation report is provided to requesting physician.

## 2024-05-06 NOTE — PATIENT INSTRUCTIONS
Preparing for Your Surgery  Getting started  A nurse will call you to review your health history and instructions. They will give you an arrival time based on your scheduled surgery time. Please be ready to share:  Your doctor's clinic name and phone number  Your medical, surgical, and anesthesia history  A list of allergies and sensitivities  A list of medicines, including herbal treatments and over-the-counter drugs  Whether the patient has a legal guardian (ask how to send us the papers in advance)  Please tell us if you're pregnant--or if there's any chance you might be pregnant. Some surgeries may injure a fetus (unborn baby), so they require a pregnancy test. Surgeries that are safe for a fetus don't always need a test, and you can choose whether to have one.   If you have a child who's having surgery, please ask for a copy of Preparing for Your Child's Surgery.    Preparing for surgery  Within 10 to 30 days of surgery: Have a pre-op exam (sometimes called an H&P, or History and Physical). This can be done at a clinic or pre-operative center.  If you're having a , you may not need this exam. Talk to your care team.  At your pre-op exam, talk to your care team about all medicines you take. If you need to stop any medicines before surgery, ask when to start taking them again.  We do this for your safety. Many medicines can make you bleed too much during surgery. Some change how well surgery (anesthesia) drugs work.  Call your insurance company to let them know you're having surgery. (If you don't have insurance, call [phone].)  Call your clinic if there's any change in your health. This includes signs of a cold or flu (sore throat, runny nose, cough, rash, fever). It also includes a scrape or scratch near the surgery site.  If you have questions on the day of surgery, call your hospital or surgery center.  Eating and drinking guidelines  For your safety: Unless your surgeon tells you otherwise,  follow the guidelines below.  Eat and drink as usual until 8 hours before you arrive for surgery. After that, no food or milk.  Drink clear liquids until 2 hours before you arrive. These are liquids you can see through, like water, Gatorade, and Propel Water. They also include plain black coffee and tea (no cream or milk), candy, and breath mints. You can spit out gum when you arrive.  If you drink alcohol: Stop drinking it the night before surgery.  If your care team tells you to take medicine on the morning of surgery, it's okay to take it with a sip of water.  Preventing infection  Shower or bathe the night before and morning of your surgery. Follow the instructions your clinic gave you. (If no instructions, use regular soap.)  Don't shave or clip hair near your surgery site. We'll remove the hair if needed.  Don't smoke or vape the morning of surgery. You may chew nicotine gum up to 2 hours before surgery. A nicotine patch is okay.  Note: Some surgeries require you to completely quit smoking and nicotine. Check with your surgeon.  Your care team will make every effort to keep you safe from infection. We will:  Clean our hands often with soap and water (or an alcohol-based hand rub).  Clean the skin at your surgery site with a special soap that kills germs.  Give you a special gown to keep you warm. (Cold raises the risk of infection.)  Wear special hair covers, masks, gowns and gloves during surgery.  Give antibiotic medicine, if prescribed. Not all surgeries need antibiotics.  What to bring on the day of surgery  Photo ID and insurance card  Copy of your health care directive, if you have one  Glasses and hearing aids (bring cases)  You can't wear contacts during surgery  Inhaler and eye drops, if you use them (tell us about these when you arrive)  CPAP machine or breathing device, if you use them  A few personal items, if spending the night  If you have . . .  A pacemaker, ICD (cardiac defibrillator) or other  implant: Bring the ID card.  An implanted stimulator: Bring the remote control.  A legal guardian: Bring a copy of the certified (court-stamped) guardianship papers.  Please remove any jewelry, including body piercings. Leave jewelry and other valuables at home.  If you're going home the day of surgery  You must have a responsible adult drive you home. They should stay with you overnight as well.  If you don't have someone to stay with you, and you aren't safe to go home alone, we may keep you overnight. Insurance often won't pay for this.  After surgery  If it's hard to control your pain or you need more pain medicine, please call your surgeon's office.  Questions?   If you have any questions for your care team, list them here: _________________________________________________________________________________________________________________________________________________________________________ ____________________________________ ____________________________________ ____________________________________  For informational purposes only. Not to replace the advice of your health care provider. Copyright   2003, 2019 [employer] [employer] [facility]. All rights reserved. Clinically reviewed by Merly Echavarria MD. [employer] [id number] - REV 12/22.    How to Take Your Medication Before Surgery  - Take all of your medications before surgery except as noted below  - HOLD (do not take) your FUROSEMIDE and HYDROCHLOROTHIAZIDE on the morning of surgery.   - HOLD (do not take) your METFORMIN on the morning of surgery.  - HOLD (do not take) Aspirin for 7 days prior to surgery  - Decrease Lantus to 40 Units on night before surgery. Then resume regular dose (54 iunits) the night after surgery.

## 2024-05-07 ENCOUNTER — VIRTUAL VISIT (OUTPATIENT)
Dept: FAMILY MEDICINE | Facility: CLINIC | Age: 69
End: 2024-05-07
Payer: COMMERCIAL

## 2024-05-07 DIAGNOSIS — H10.32 ACUTE BACTERIAL CONJUNCTIVITIS OF LEFT EYE: Primary | ICD-10-CM

## 2024-05-07 LAB
ALBUMIN SERPL BCG-MCNC: 4.1 G/DL (ref 3.5–5.2)
ALP SERPL-CCNC: 100 U/L (ref 40–150)
ALT SERPL W P-5'-P-CCNC: 65 U/L (ref 0–70)
ANION GAP SERPL CALCULATED.3IONS-SCNC: 12 MMOL/L (ref 7–15)
AST SERPL W P-5'-P-CCNC: 48 U/L (ref 0–45)
BILIRUB SERPL-MCNC: 0.6 MG/DL
BUN SERPL-MCNC: 21 MG/DL (ref 8–23)
CALCIUM SERPL-MCNC: 9.3 MG/DL (ref 8.8–10.2)
CHLORIDE SERPL-SCNC: 94 MMOL/L (ref 98–107)
CREAT SERPL-MCNC: 1.05 MG/DL (ref 0.67–1.17)
DEPRECATED HCO3 PLAS-SCNC: 28 MMOL/L (ref 22–29)
EGFRCR SERPLBLD CKD-EPI 2021: 78 ML/MIN/1.73M2
GLUCOSE SERPL-MCNC: 247 MG/DL (ref 70–99)
POTASSIUM SERPL-SCNC: 3.9 MMOL/L (ref 3.4–5.3)
PROT SERPL-MCNC: 6.3 G/DL (ref 6.4–8.3)
SODIUM SERPL-SCNC: 134 MMOL/L (ref 135–145)

## 2024-05-07 PROCEDURE — 99441 PR PHYSICIAN TELEPHONE EVALUATION 5-10 MIN: CPT | Mod: 93 | Performed by: FAMILY MEDICINE

## 2024-05-07 RX ORDER — OFLOXACIN 3 MG/ML
1-2 SOLUTION/ DROPS OPHTHALMIC 4 TIMES DAILY
Qty: 5 ML | Refills: 0 | Status: SHIPPED | OUTPATIENT
Start: 2024-05-07 | End: 2024-05-16

## 2024-05-07 NOTE — PROGRESS NOTES
Erickson is a 68 year old who is being evaluated via a billable telephone visit.    What phone number would you like to be contacted at? [phone]  How would you like to obtain your AVS? Emiliana  Originating Location (pt. Location): Home    Distant Location (provider location):  On-site    Assessment & Plan     Acute bacterial conjunctivitis of left eye  Conjunctivitis, symptoms are most consistent with bacterial infection.  Allergy to sulfa.  Will treat with abx drops as below.  Conservative cares discussed.  RTC if symptoms persist or worsen.   - ofloxacin (OCUFLOX) 0.3 % ophthalmic solution; Place 1-2 drops Into the left eye 4 times daily (For 5-7 days, then stop)                Subjective   Erickson is a 68 year old, presenting for the following health issues:  Conjunctivitis    History of Present Illness       Reason for visit:  Pink eye      Symptoms started ~24 hours ago.  Left eye is pink, no extreme redness or swelling.  No pain/discomfort in the eye or with blinking, just mild irritation only when wiping off the discharge. Vision is fine when discharge is cleared.  Discharge is yellow/white.  Having to wipe it off a lot.    No other URI sx's- no runny nose, congestion, cough, sore throat or sinus/ear pain.  No fevers/chills.    Has had eye infections in the past, but not since he had little kids ~30 yrs ago.    Did get a covid shot yesterday.  He had mild eye sx's starting prior to getting it.        Allergies   Allergen Reactions    Amoxicillin Diarrhea    Cephalexin Diarrhea    Liraglutide Other (See Comments), Headache and Unknown     Other reaction(s): Headache, Unknown  PN: migraines - Increased migraine frequency and severity      Sulfa Antibiotics Angioedema and Swelling     Pt has taken  Taken sulfa drugs orally without trouble. He had problems with Sulfa eye drops. Eye swelled up              Review of Systems  Constitutional, HEENT, cardiovascular, pulmonary, gi and gu systems are negative, except  as otherwise noted.      Objective    Vitals - Patient Reported  Systolic (Patient Reported):  (n/a)  Diastolic (Patient Reported):  (n/a)  Weight (Patient Reported):  (n/a)  Height (Patient Reported):  (n//a)  SpO2 (Patient Reported):  (n/a)  Temperature (Patient Reported):  (n/a)  Pulse (Patient Reported):  (n/a)      Vitals:  No vitals were obtained today due to virtual visit.    Physical Exam   General: Alert and no distress //Respiratory: No audible wheeze, cough, or shortness of breath // Psychiatric:  Appropriate affect, tone, and pace of words            Phone call duration: 8 minutes  Signed Electronically by: Lara Sage MD

## 2024-05-08 ENCOUNTER — HOSPITAL ENCOUNTER (OUTPATIENT)
Dept: WOUND CARE | Facility: CLINIC | Age: 69
Discharge: HOME | End: 2024-05-08
Attending: FAMILY MEDICINE | Admitting: FAMILY MEDICINE
Payer: COMMERCIAL

## 2024-05-08 VITALS — HEART RATE: 71 BPM | SYSTOLIC BLOOD PRESSURE: 115 MMHG | TEMPERATURE: 97.1 F | DIASTOLIC BLOOD PRESSURE: 76 MMHG

## 2024-05-08 DIAGNOSIS — R60.0 LEG EDEMA: Primary | ICD-10-CM

## 2024-05-08 DIAGNOSIS — L97.922 CHRONIC ULCER OF LEFT LEG WITH FAT LAYER EXPOSED (H): ICD-10-CM

## 2024-05-08 PROCEDURE — 99214 OFFICE O/P EST MOD 30 MIN: CPT | Performed by: FAMILY MEDICINE

## 2024-05-08 PROCEDURE — 97602 WOUND(S) CARE NON-SELECTIVE: CPT

## 2024-05-08 NOTE — TELEPHONE ENCOUNTER
Rescheduled colonoscopy.    Pre visit planning completed.      Procedure details:    Patient scheduled for Colonoscopy  on 5.20.2024.     Arrival time: 1430. Procedure time 1530    Facility location: [facility]; [facility] [place] [address]. Check in location: [facility].     Sedation type: MAC    Pre op exam completed: 5.6.2024 with Sherwin Mejia at [facility]    Indication for procedure: screening colonoscopy      Chart review:     Electronic implanted devices? No    Recent diagnosis of diverticulitis within the last 6 weeks? No    Diabetic? Yes. Diabetic medication HOLDING recommendations: Oral diabetic medications: Yes:  Metformin (glucophage): HOLD day of procedure.   Diabetic injectables: Yes- Ozempic (Semaglutide). Weekly dosing of medication.  Hold 7 days before procedure.  Follow up with managing provider.   Insulin: Yes. Consult with managing provider       Medication review:    Anticoagulants? No    NSAIDS? No NSAID medications per patient's medication list.  RN will verify with pre-assessment call.    Other medication HOLDING recommendations:  N/A      Prep for procedure:     Bowel prep recommendation: Extended Golytely. R/S procedure, does the patient have their prep kit?  Due to: BMI > 40.     Prep instructions sent via andrez Colby RN  Endoscopy Procedure Pre Assessment RN  [phone] option 4

## 2024-05-08 NOTE — PROGRESS NOTES
Wound Clinic Note         Visit date: 05/08/2024       Cheif Complaint:     Parmjit Hernández is a 68 year old   male had concerns including WOUND CARE.  The patient has lower extremity edema and bilateral leg ulcers.         HISTORY OF PRESENT ILLNESS:    Parmjit Hernández reports the wound has been present since May 2024.  The wound began as a water blister that ruptured.   He has lymphedema and has had areas open and closed on his legs over the years many times in the past.    Reports for the most part since his last clinic visit with me he has not had any open draining wounds on his legs.  On his left anterior shin he had a another water blister develop recently which ruptured but then quickly scabbed over.  There is been no drainage from this area.  Also his right first toe has a callus on it which is a crack but there is been no drainage from this area either.  Again he has not been applying any bandages to either these areas.  He does wear his edema wear stockings on both legs every day.        The pateint denies fevers or chills.  They report the pain from the wound has been 0/10 and has remained about the same recently.      The patient reports laying down to elevate their legs above the level of their heart at least twice a day.     Today the patient reports maintaining a high protein diet, but has not been taking protein supplements lately.        The patient denies a history of diabetes, smoking or chronic steroid use.         The patient has not had any symptoms of infection relating to the wound recently and is not currently on antibiotics.       Problem List:   Past Medical History:   Diagnosis Date    Acquired lymphedema 02/04/2019    Allergic state     Amyloidosis (H)     followed by hematology Dr. Romeo status post peripheral stem cell transplant    Anxiety 05/11/2016    Anxiety and depression 12/18/2013    Bipolar I disorder (H) 09/01/2015    Under care of psychiatrist [facility]- Dr Lacey isaacs  25 mg, 2 cap bedtime DULoxetine 20 mg once daily  OLANZapine 7.5 mg  1 tab bedtime     Cerebral infarction, unspecified (H) 07/13/2015    DDD (degenerative disc disease), lumbar 08/06/2020    Depressive disorder 1995    EKG abnormality 04/20/2020    H/O bone marrow transplant (H)     Headache(784.0)     History of diverticulitis 01/27/2015    1/15-rxed with antibiotics     Hyperlipidemia LDL goal <100 10/31/2010    Hypertension 2007    Hypertension goal BP (blood pressure) < 140/90 10/11/2011    Marianne (H) 08/20/2015    Morbid obesity (H) 08/28/2018    Myalgia and myositis, unspecified     Narcotic dependence (H) 09/06/2016    None in about 6 months- 8/1/17    NSTEMI (non-ST elevated myocardial infarction) (H) 04/19/2020    OCD (obsessive compulsive disorder)     Other acquired absence of organ 1994    Other cirrhosis of liver (H) possibly from vences  04/15/2020    Other specified viral warts     Peripheral edema 05/20/2018    Noncardiac Continue with  furosemide (LASIX) 20 MG tablet,  potassium       chloride SA (K-DUR/KLOR-CON M) 10 MEQ CR  Tab once daily  Basic metabolic panel    Polyneuropathy associated with underlying disease (H24) 02/04/2019    Restless legs syndrome (RLS) 06/29/2017    Stasis dermatitis of both legs 12/31/2018    Type 2 diabetes mellitus with other specified complication (H) 07/10/2017             Family Hx: family history includes Alcohol/Drug in his father, maternal grandmother, mother, and paternal grandfather; Allergies in his father and sister; Arthritis in his mother; Asthma in his father; C.A.D. in his father, maternal grandmother, and mother; Cerebrovascular Disease in his maternal grandfather, maternal grandmother, mother, paternal grandfather, and paternal grandmother; Circulatory in his father; Depression in his daughter, father, sister, and son; Diabetes in his brother; Gynecology in his sister; Heart Disease in his father; Hypertension in his father and mother; Psychotic Disorder  in his son; Respiratory in his father.       Surgical Hx:   Past Surgical History:   Procedure Laterality Date    ABDOMEN SURGERY  2007    abdominal hernia    BACK SURGERY  8/6/2020    BIOPSY  june 2015    negative    BMT PROTOCOL      for systemic amyloidosis    BONE MARROW BIOPSY, BONE SPECIMEN, NEEDLE/TROCAR N/A 6/8/2016    Procedure: BIOPSY BONE MARROW;  Surgeon: Nathan Agrawal MD;  Location:  GI    BONE MARROW BIOPSY, BONE SPECIMEN, NEEDLE/TROCAR N/A 2/20/2019    Procedure: BIOPSY BONE MARROW;  Surgeon: Michael Raygoza MD;  Location:  GI    CHOLECYSTECTOMY  1995    lap qian    COLONOSCOPY  2012    hx polyps    ESOPHAGOSCOPY, GASTROSCOPY, DUODENOSCOPY (EGD), COMBINED N/A 5/29/2015    Procedure: COMBINED ESOPHAGOSCOPY, GASTROSCOPY, DUODENOSCOPY (EGD), BIOPSY SINGLE OR MULTIPLE;  Surgeon: John Jacob MD;  Location:  GI    HERNIA REPAIR, UMBILICAL  2006    [facility] NONSPECIFIC PROCEDURE  94    Cholecystectomy    [facility] NONSPECIFIC PROCEDURE  2000    repair deviated septum          Allergies:    Allergies   Allergen Reactions    Amoxicillin Diarrhea    Cephalexin Diarrhea    Liraglutide Other (See Comments), Headache and Unknown     Other reaction(s): Headache, Unknown  PN: migraines - Increased migraine frequency and severity      Sulfa Antibiotics Angioedema and Swelling     Pt has taken  Taken sulfa drugs orally without trouble. He had problems with Sulfa eye drops. Eye swelled up                Medication History:    Current Outpatient Medications   Medication Sig Dispense Refill    acetaminophen (TYLENOL) 500 MG tablet Take 1,000 mg by mouth every 8 hours as needed      albuterol (PROAIR HFA/PROVENTIL HFA/VENTOLIN HFA) 108 (90 Base) MCG/ACT inhaler Inhale 2 puffs into the lungs every 6 hours as needed for shortness of breath, wheezing or cough 18 g 0    aspirin (ASPIRIN LOW DOSE) 81 MG tablet Take 1 tablet (81 mg) by mouth daily 30 tablet 3    atorvastatin (LIPITOR) 20 MG tablet  Take 1.5 tablets (30 mg) by mouth daily 135 tablet 3    Bioflavonoid Products (CANDIDO-C) TABS Take 1,000 mg by mouth daily 90 tablet 3    bisacodyl (DULCOLAX) 5 MG EC tablet Two days prior to exam take two (2) tablets at 4pm. One day prior to exam take two (2) tablets at 4pm 4 tablet 0    buPROPion (WELLBUTRIN XL) 150 MG 24 hr tablet Take 1 tablet (150 mg) by mouth every morning 90 tablet 3    calcium carbonate (OS-ULIS) 1500 (600 Ca) MG tablet Take 600 mg by mouth daily      cariprazine (VRAYLAR) 4.5 MG capsule Take 1 capsule (4.5 mg) by mouth daily 90 capsule 3    Continuous Blood Gluc  (FREESTYLE VERA 3 READER) MARAL 1 each every 14 days To test blood sugar 1 each 0    DULoxetine (CYMBALTA) 30 MG capsule Take 1 capsule (30 mg) by mouth 2 times daily 180 capsule 3    Folic Acid-Vit B6-Vit B12 0.5-5-0.2 MG TABS Take 1 tablet by mouth daily 90 tablet 3    furosemide (LASIX) 20 MG tablet Take 2 tablets (40 mg) daily x 7 days. Then take 1  tablet (20 mg) daily 90 tablet 0    glucose 40 % (400 mg/mL) gel Take by mouth every 15 minutes as needed for low blood sugar      hydrochlorothiazide (HYDRODIURIL) 25 MG tablet Take 1.5 tablets (37.5 mg) by mouth daily 135 tablet 3    insulin glargine (LANTUS PEN) 100 UNIT/ML pen Inject 54 Units Subcutaneous at bedtime 15 mL 0    insulin pen needle (31G X 8 MM) 31G X 8 MM miscellaneous Use one pen needles daily or as directed. 100 each 1    loperamide (IMODIUM) 2 MG capsule Take 1 capsule (2 mg) by mouth 4 times daily as needed for diarrhea      losartan (COZAAR) 100 MG tablet Take 1 tablet (100 mg) by mouth daily 90 tablet 3    medication given by implanted intrathecal pump continuous Drug # 1: Fentanyl (Sublimaze) - Conc: 2000 mcg/mL - Total Dose / 24 hours: 1663.3 mcg  Drug # 2: Bupivacaine (Marcaine)  - Conc: 20 mg/mL - Total Dose / 24 hours: 16.633 mg  Drug # 3: Morphine (Duramorph or Infumorph)  - Conc: 9.6 mg/mL - Total Dose / 24 hours: 7.9836 mg    Pump Reservoir  Volume: 40 mL  Pump Reservoir Alarm Volume: 2 ml  Outside Clinic & Provider: [facility]  Last Refill Date: 12/21/23  Next Refill Date: 1/28/24  Low [place] Alarm Date:  Pump : Medtronic SynchroMed II    Boluses: Up to 3 per day, 4 minute duration. Lock-out every 6 hours (also has dose restriction interval noted 1 per 8 hours)   Fentanyl: 110.1 mcg/dose  Bupivacaine: 1.101 mg/dose  Morphine: 0.5287 mg/dose      metFORMIN (GLUCOPHAGE XR) 500 MG 24 hr tablet Take 2 tablets (1,000 mg) by mouth 2 times daily (with meals) 360 tablet 3    metoprolol succinate ER (TOPROL XL) 100 MG 24 hr tablet Take 1 tablet (100 mg) by mouth daily 90 tablet 3    modafinil (PROVIGIL) 200 MG tablet Take 2 tablets (400 mg) by mouth daily Pt has increased his dose to 400mg daily from 300mg daily.  Has a message in to his physician for a dose increase. 60 tablet 2    mometasone (ELOCON) 0.1 % external cream Apply topically daily as needed For psoriasis on face      multivitamin w/minerals (THERA-VIT-M) tablet Take 1 tablet by mouth daily Men's 50+      nitroGLYcerin (NITROSTAT) 0.4 MG sublingual tablet Place 1 tablet (0.4 mg) under the tongue every 5 minutes as needed for chest pain For chest pain place 1 tablet under the tongue every 5 minutes for 3 doses. If symptoms persist 5 minutes after 1st dose call 911. 30 tablet 1    ofloxacin (OCUFLOX) 0.3 % ophthalmic solution Place 1-2 drops Into the left eye 4 times daily (For 5-7 days, then stop) 5 mL 0    polyethylene glycol (GOLYTELY) 236 g suspension Take as directed. Two days before your exam fill the first container with water. Cover and shake until mixed well. At 5:00pm drink one 8oz glass every 10-15 minutes until half (1/2) of the first container is empty. Store the remainder in the refrigerator. One day before your exam at 5:00pm drink the second half of the first container until it is gone. Before you go to bed mix the second container with water  and put in refrigerator. Six hours before your check in time drink one 8oz glass every 10-15 minutes until half of container is empty. Discard the remainder of solution. 8000 mL 0    POTASSIUM PO Take 1 tablet by mouth daily Unspecified tablet strength; patient estimated 600 mg, takes for leg cramps      pregabalin (LYRICA) 100 MG capsule Take 1 capsule (100 mg) by mouth at bedtime      semaglutide (OZEMPIC) 2 MG/3ML pen Inject 0.5 mg Subcutaneous every 7 days 3 mL 2    senna-docusate (SENOKOT-S/PERICOLACE) 8.6-50 MG tablet Take 1-2 tablets by mouth 2 times daily as needed      SKYRIZI  MG/ML subcutaneous Inject 150 mg Subcutaneous once      SUMAtriptan (IMITREX) 50 MG tablet Take 1 tablet (50 mg) by mouth at onset of headache for migraine May repeat in 2 hours. Max 4 tablets/24 hours. 8 tablet 1    tamsulosin (FLOMAX) 0.4 MG capsule Take 1 capsule (0.4 mg) by mouth 2 times daily 180 capsule 3    testosterone (ANDROGEL/TESTIM) 50 MG/5GM (1%) topical gel Place 1 packet (50 mg of testosterone) onto the skin daily 30 packet 1    vitamin B-Complex Take 3 tablets by mouth daily      vitamin D3 (CHOLECALCIFEROL) 50 mcg (2000 units) tablet Take 1 tablet (50 mcg) by mouth daily 90 tablet 3     No current facility-administered medications for this encounter.         Tobacco History:  reports that he has never smoked. He has never used smokeless tobacco.       REVIEW OF SYMPTOMS:   The review of systems was negative except as noted in the HPI.           PHYSICAL EXAMINATION:     /76 (BP Location: Left arm, Patient Position: Sitting)   Pulse 71   Temp 97.1  F (36.2  C) (Temporal)            GENERAL: The patient overall appears well and is no acute distress.   HEAD: normocephalic   EYES: Sclera and conjunctiva clear   NECK: no obvious masses   LUNGS: breathing is unlabored.   EXTREMITIES: No clubbing, cyanosis or edema   SKIN: No rashes or other abnormalities except as noted under the Wound section below.    NEUROLOGICAL: normal motor and sensory function   EDEMA: Moderate  and Lymphedema       WOUND: The wound appears healthy with no sign of infection.   Wound bed: granulation tissue  Periwound: healthy intact skin  On the left anterior shin there is a very superficial wound which appears to be a ruptured water blister.  It sounds like the scab came off today while they were removing the edema wear stockings in clinic.  On his right first toe he has a thick callus with a crack in it but there is no drainage and no sign of infection here.      Also see below for wound details:       Circumferential volume measures:             No data to display                Ulceration(s)/Wound(s):   Please see the media tab under the chart review for pictures of the wounds.  Nursing staff removed dressings and cleansed wound.    Wound (used by OP [facility] only) 03/16/23 0911 Right 1 toe neuropathic ulcer (Active)   Thickness/Stage other (see comments) 05/08/24 0848   Base pink;black;dry 05/08/24 0848   Periwound dry;other (see comments) 05/08/24 0848   Periwound Temperature warm 05/08/24 0848   Periwound Skin Turgor firm 05/08/24 0848   Edges callused 05/08/24 0848   Length (cm) 0 05/08/24 0848   Width (cm) 0 05/08/24 0848   Depth (cm) 0 05/08/24 0848   Wound (cm^2) 0 cm^2 05/08/24 0848   Wound Volume (cm^3) 0 cm^3 05/08/24 0848   Wound healing % 100 05/08/24 0848   Drainage Amount none 05/08/24 0848   Care, Wound cleansed with 05/08/24 0848       Wound (used by [facility] only) 04/17/24 0841 Left anterior;lower leg ulceration, venous (Active)   Thickness/Stage full thickness 05/08/24 0848   Base pink;red 05/08/24 0848   Periwound edematous;blistered;redness 05/08/24 0848   Periwound Temperature warm 05/08/24 0848   Periwound Skin Turgor soft 05/08/24 0848   Edges open 05/08/24 0848   Length (cm) 1.9 05/08/24 0848   Width (cm) 1.8 05/08/24 0848   Depth (cm) 0.2 05/08/24 0848   Wound (cm^2) 3.42 cm^2 05/08/24 0848   Wound Volume (cm^3) 0.68  cm^3 05/08/24 0848   Wound healing % -3700 05/08/24 0848   Drainage Characteristics/Odor serosanguineous 05/08/24 0848   Drainage Amount moderate 05/08/24 0848   Care, Wound non-select wound debridement performed. 05/08/24 0848               Recent Labs   Lab Test 03/21/24  1104 09/18/23  1629 05/12/23  0634   A1C 10.9* 9.8* 8.8*          Recent Labs   Lab Test 03/21/24  1104 12/25/23  0747 12/24/23  1442   ALBUMIN 4.1 3.8 3.9              No sharp debridement performed today.                  ASSESSMENT:   This is a 68 year old  male with bilateral leg ulcers, the patient also has lower extremity edema which was also managed during today's clinic visit.          PLAN:   He will bandage the left anterior shin wound with a Mepilex bandage and his edema wear stocking change 3 times a week.  I have encouraged him to keep an appointment with the podiatrist to address the callus on his right first toe.    Separate from the wound care instructions we then discussed management strategies for lower extremity edema.  I explained the keys for managing lower extremity edema are compression and elevation.  I have encouraged the patient to continue to elevate the legs as they have been doing, including laying down with their legs above the level of the heart for 30 minutes at least twice a day.     I have encouraged the patient to continue on their high protein diet to aid in wound healing.   The patient prefers to follow-up in the wound clinic on an as-needed basis if further wounds develop.        30 minutes spent on the date of the encounter doing chart review, history and exam, documentation and further activities per the note, this time excludes any procedure time      Jose Nash MD  05/08/2024   9:03 AM   [facility]/Wound  [phone]    This note was electronically signed by Jose Nash MD        Further instructions from your care team         05/08/2024   Erickson Henrández    1955    A DME order for supplies has been placed to [employer]. If there are any issues with your order including not receiving the order please call [employer] at [phone]. They can also provide a tracking number for you.       Dressing changes outside of clinic are being performed by Patient    PLAN:  -Make an appointment with a podiatrist for your right toe callous/fissure. List of locations listed below.    Wound Dressing Change: Left anterior lower leg    -Wash your hands with soap and water before you begin your dressing change and prepare a clean surface for dressings.  -Cleanse with mild unscented soap and water (such as Cetaphil, Cerave or Dove)   -Apply a ceramide based lotion (Cera-Ve, Vanicream, Cetaphil) to intact skin  -Primary dressing: Apply 1 4x4 silicone border mepilex  -Pull up navy stripe edemawear from toes to behind the knee  -Change three times per week  -Follow up with us as needed if wound does not heal in about two weeks    Right toe 1  - Wash your hands with soap and water before you begin your dressing change and prepare a clean surface for dressings.  -Keep area clean and dry  No bandages required however follow up with a podiatrist    Elevation:  It is recommended that you elevate your legs above the level of your heart for 30 minutes: approximately 2-3 times each day   Ways to do this:   - Lay on the couch or your bed and prop your legs up on pillows   - Recline back as far as you can go in your recliner and prop your legs on pillows.   Doing these things will help reduce the edema in your legs.    ROUTINE FOOT CARE (NAIL TRIMMING / CALLUSES)  Go to [email or web address] ([employer]) and search for providers near you.  Otherwise, this is a list we have gathered of  recommended locations/providers in [place].  FYI: *Some providers accept insurance while others are out of pocket. Please contact them for details*    [facility]   [phone]   [facility]  [phone]  [email or web address]   [facility]  [phone]  [place] [address] [place] [facility]  [phone]  [email or web address]   [facility]  [address], [place] [address]  [phone] Ravi Rodas DPLADAN  [address] [place] [address]   [phone]   [facility]   [address] [facility] [address]  [phone] [facility]  Dr. Marshal De Jesus  [phone]  [facility]  [phone]  [facility]  (does not take BCBS) [facility]    [facility]  [phone]       A diet high in protein is important for wound healing, we recommend getting 90 grams of protein per day. Taking protein shakes or bars are a good way to get extra protein in your diet.     Good sources of protein:  Pork 26g per 3 oz  Whey protein powder - 24g per scoop (on average)  Greek yogurt - 23g per 8oz   Chicken or Turkey - 23g per 3oz  Fish - 20-25g per 3oz  Beef - 18-23g per 3oz  Tofu - 10g per 1/2 cup  Navy beans - 20g per cup  Cottage cheese - 14g per 1/2 cup   Lentils - 13g per 1/4 cup  Beef jerky 13g per 1oz  2% milk - 8g per cup  Peanut butter - 8g per 2 tablespoons  Eggs - 6g per egg  Mixed nuts - 6g per 2oz     Main Provider: Jose Nash M.D. May 8, 2024    Call us at [phone] if you have any questions about your wounds, if you have redness or swelling around your wound, have a fever of 101 degrees Fahrenheit or greater or if you have any other problems or concerns. We answer the phone Monday through Friday 8 am to 4 pm, please leave a message as we check the voicemail frequently throughout the day. If you have a concern over the weekend, please leave a message and we will return your call Monday. If the need is urgent, go to the ER or urgent care.    If you had a positive experience please indicate that on your patient satisfaction survey form that [facility] will be sending you.    It was a pleasure meeting with you today.  Thank you for allowing me and my team the privilege of caring for you today.  YOU are the reason we are here, and I truly hope we provided you with the excellent service you deserve.  Please let us know if there is anything else we can do for you so that we can be sure you are leaving completely satisfied with your care experience.      If you have any billing related questions please call the [facility] office at [phone]. The clinic staff does not handle billing related matters.    If you are scheduled to have a follow up appointment, you will receive a reminder call the day before your visit. On the appointment day please arrive 15 minutes prior to your appointment time. If you are unable to keep that appointment, please call the clinic to cancel or reschedule. If you are more than 10 minutes late or greater for your scheduled appointment time, the clinic policy is that you may be asked to reschedule.         ,

## 2024-05-08 NOTE — TELEPHONE ENCOUNTER
Pre assessment completed for upcoming procedure.   (Please see previous telephone encounter notes for complete details)      Procedure details:    Arrival time and facility location reviewed.    Pre op exam completed: 5.6.2024 with Sherwin Mejia at [facility]     Designated  policy reviewed. Instructed to have someone stay 24  hours post procedure.       Medication review:    Medications reviewed. Please see supporting documentation below. Holding recommendations discussed (if applicable).       Prep for procedure:     Procedure prep instructions reviewed.        Any additional information needed:  N/A      Patient  verbalized understanding and had no questions or concerns at this time.      Carrie Colby RN  Endoscopy Procedure Pre Assessment RN  [phone] option 4

## 2024-05-08 NOTE — DISCHARGE INSTRUCTIONS
05/08/2024   Erickson Hernández   1955    A DME order for supplies has been placed to [employer]. If there are any issues with your order including not receiving the order please call [employer] at [phone]. They can also provide a tracking number for you.       Dressing changes outside of clinic are being performed by Patient    PLAN:  -Make an appointment with a podiatrist for your right toe callous/fissure. List of locations listed below.    Wound Dressing Change: Left anterior lower leg    -Wash your hands with soap and water before you begin your dressing change and prepare a clean surface for dressings.  -Cleanse with mild unscented soap and water (such as Cetaphil, Cerave or Dove)   -Apply a ceramide based lotion (Cera-Ve, Vanicream, Cetaphil) to intact skin  -Primary dressing: Apply 1 4x4 silicone border mepilex  -Pull up navy stripe edemawear from toes to behind the knee  -Change three times per week  -Follow up with us as needed if wound does not heal in about two weeks    Right toe 1  - Wash your hands with soap and water before you begin your dressing change and prepare a clean surface for dressings.  -Keep area clean and dry  No bandages required however follow up with a podiatrist    Elevation:  It is recommended that you elevate your legs above the level of your heart for 30 minutes: approximately 2-3 times each day   Ways to do this:   - Lay on the couch or your bed and prop your legs up on pillows   - Recline back as far as you can go in your recliner and prop your legs on pillows.   Doing these things will help reduce the edema in your legs.    ROUTINE FOOT CARE (NAIL TRIMMING / CALLUSES)  Go to [email or web address] ([employer]) and search for providers near you.  Otherwise, this is a list we have gathered of  recommended locations/providers in [place].  FYI: *Some providers accept insurance while others are out of pocket. Please contact them for details*    [facility]    [phone]   [facility]  [phone]  [email or web address]   [facility]  [phone]  [place] + [place] mile radius [facility]  [phone]  [email or web address]   [facility]  [address], [place] [address]  [phone] Ravi Rodas, DPLADAN  [address] [place] [address]   [phone]   [facility]   [address] [facility] [address]  [phone] [facility]  Dr. Marshal De Jesus  [phone]  [place], [place]   [facility]  [phone]  [facility]  (does not take BCBS) [facility]    [facility]  [phone]       A diet high in protein is important for wound healing, we recommend getting 90 grams of protein per day. Taking protein shakes or bars are a good way to get extra protein in your diet.     Good sources of protein:  Pork 26g per 3 oz  Whey protein powder - 24g per scoop (on average)  Greek yogurt - 23g per 8oz   Chicken or Turkey - 23g per 3oz  Fish - 20-25g per 3oz  Beef - 18-23g per 3oz  Tofu - 10g per 1/2 cup  Navy beans - 20g per cup  Cottage cheese - 14g per 1/2 cup   Lentils - 13g per 1/4 cup  Beef jerky 13g per 1oz  2% milk - 8g per cup  Peanut butter - 8g per 2 tablespoons  Eggs - 6g per egg  Mixed nuts - 6g per 2oz     Main Provider: Jose Nash M.D. May 8, 2024    Call us at [phone] if you have any questions about your wounds, if you have redness or swelling around your wound, have a fever of 101 degrees Fahrenheit or greater or if you have any other problems or concerns. We answer the phone Monday through Friday 8 am to 4 pm, please leave a message as we check the voicemail frequently throughout the day. If you have a concern over the weekend, please leave a message and we will return your call Monday. If the need is urgent, go to the ER or urgent care.    If you had a positive experience please indicate that on your patient satisfaction  survey form that [facility] will be sending you.    It was a pleasure meeting with you today.  Thank you for allowing me and my team the privilege of caring for you today.  YOU are the reason we are here, and I truly hope we provided you with the excellent service you deserve.  Please let us know if there is anything else we can do for you so that we can be sure you are leaving completely satisfied with your care experience.      If you have any billing related questions please call the [facility] office at [phone]. The clinic staff does not handle billing related matters.    If you are scheduled to have a follow up appointment, you will receive a reminder call the day before your visit. On the appointment day please arrive 15 minutes prior to your appointment time. If you are unable to keep that appointment, please call the clinic to cancel or reschedule. If you are more than 10 minutes late or greater for your scheduled appointment time, the clinic policy is that you may be asked to reschedule.

## 2024-05-09 ENCOUNTER — VIRTUAL VISIT (OUTPATIENT)
Dept: PHARMACY | Facility: CLINIC | Age: 69
End: 2024-05-09
Payer: COMMERCIAL

## 2024-05-09 DIAGNOSIS — F31.11 BIPOLAR 1 DISORDER, MANIC, MILD (H): ICD-10-CM

## 2024-05-09 DIAGNOSIS — I10 HYPERTENSION GOAL BP (BLOOD PRESSURE) < 140/90: ICD-10-CM

## 2024-05-09 DIAGNOSIS — E11.65 TYPE 2 DIABETES MELLITUS WITH HYPERGLYCEMIA, WITH LONG-TERM CURRENT USE OF INSULIN (H): Primary | ICD-10-CM

## 2024-05-09 DIAGNOSIS — L40.9 PSORIASIS: ICD-10-CM

## 2024-05-09 DIAGNOSIS — Z79.4 TYPE 2 DIABETES MELLITUS WITH HYPERGLYCEMIA, WITH LONG-TERM CURRENT USE OF INSULIN (H): Primary | ICD-10-CM

## 2024-05-09 PROCEDURE — 99207 PR NO CHARGE LOS: CPT | Mod: 93

## 2024-05-09 NOTE — PROGRESS NOTES
"Medication Therapy Management (MTM) Encounter    ASSESSMENT:                            Medication Adherence/Access: See below for considerations.      Diabetes:   Patient is not meeting A1c goal of < 7%. Patient is not meeting goal of > 70% time in target with continuous glucose monitoring. Patient would benefit from increasing the Ozempic to 1 mg weekly, however will hold off on dose until after his upcoming LS spine fusion on 05/31/24.   UACR not at goal, but appropriately taking ARB.    Hypertension:   Patient is meeting blood pressure goal of < 140/90mmHg. Patient's clinic blood pressures have improved since taking Metoprolol  and hydrochlorothiazide 37.5 mg. Will need to monitor electrolytes and kidney function closely due to taking two diuretics.     Bipolar disorder:   Mood has had some minor improvement since restarting Bupropion. Will continue to monitor.     Psoriasis:   Plaques continue to improve with longer use of Skyrizi. Will continue to monitor, followed by dermatology     PLAN:                            Continue medications as prescribed. Will plan to increase Ozempic to 1 mg weekly after your fusion procedure on 05/31/24     Follow-up: Return in 4 weeks (on 6/6/2024) for Follow up, with me, using a phone visit, medication therapy management.    SUBJECTIVE/OBJECTIVE:                          Erickson Hernández is a 68 year old male called for a follow-up visit from 04/18/24.       Reason for visit: Diabetes, Blood Pressure, Mood Follow-Up.    Allergies/ADRs: Reviewed in chart  Past Medical History: Reviewed in chart  Tobacco: He reports that he has never smoked. He has never used smokeless tobacco.  Alcohol: 4-6 beverages / week    Medication Adherence/Access:   Patient estimates that he misses medications once per week. Since he primarily takes them in the morning it is usually an \"all or none\" situation this includes Lantus. However, he does say that he never misses taking Vraylar.  Patient " "receives patient assistance for Ozempic, Duloxetine, Vraylar, Skyrizi.       Diabetes   Lantus 54 units once daily- no issues reported    Metformin  mg two tablets twice daily with meals - no issues reported     Ozempic 0.5 mg - Takes doses on Fridays. States that he has not missed any doses since last visit  Denies nausea, diarrhea, or constipation associated near the days he does his dose. Patient says that he has not noticed changes to appetite     Aspirin 81mg daily    Blood sugar monitoring: He started using the Libre3 on   Patient Reports:   Reports that the time in range is 34%  47% 181-250 mg/dL   19% >250.   No lows blood sugars     Denies symptoms of diabetes.       Eye exam is up to date  Foot exam is up to date  Urine Albumin:   Lab Results   Component Value Date    UMALCR 63.73 (H) 03/21/2024      Lab Results   Component Value Date    A1C 10.9 (H) 03/21/2024       Hypertension   Metoprolol  mg daily  Losartan 100mg daily  Furosemide 20 mg daily   Hydrochlorothiazide 37.5 mg daily - restarted by Dr. Radha Mcdermott on 04/18/24  Patient reports no current medication side effects.   Has not been taking blood pressures at home.     BP Readings from Last 3 Encounters:   05/08/24 115/76   05/06/24 102/70   04/18/24 114/86     Pulse Readings from Last 3 Encounters:   05/08/24 71   05/06/24 84   04/17/24 95     Bipolar disorder:   Bupropion  mg daily   Duloxetine 30 mg twice daily   Vraylar 4.5mg daily     Mostly feeling good. Is starting a new and better job which brings along some stress and pressure. But overall is doing good. Denies SI  or thoughts of self harm.  No side effects reported     Psoriasis:   Skyrizi 150 mg/mL - \"has been a miracle medication\"  Says that plaques plaques are improving tremendously  and is not experiencing any flaking skin.   Tolerating medication well.   No issues reported   ----------------  I spent 17 minutes with this patient today. I offer these suggestions " for consideration by Dr. Radha Mcdermott. A copy of the visit note was provided to the patient's provider(s).    A summary of these recommendations was sent via [employer].    Roxann Hdz PharmD  Medication Therapy Management Resident, [facility]  Pager: [phone]  Email: [email or web address]    Preceptor cosignature: Parmjit Hernández was seen independently by Dr. Hdz. I have reviewed the assessment and plan. Minda Smith PharmD, TOSHIA, BCACP    Telemedicine Visit Details  Type of service:  Telephone visit  Start Time:  9:00  AM  End Time:  9:17 AM     Medication Therapy Recommendations  No medication therapy recommendations to display

## 2024-05-09 NOTE — PATIENT INSTRUCTIONS
"Recommendations from today's MTM visit:                                                    MTM (medication therapy management) is a service provided by a clinical pharmacist designed to help you get the most of out of your medicines.   Today we reviewed what your medicines are for, how to know if they are working, that your medicines are safe and how to make your medicine regimen as easy as possible.      Continue medications as prescribed. Will plan to increase Ozempic to 1 mg weekly after your fusion procedure on 05/31/24     Follow-up: Return in 4 weeks (on 6/6/2024) for Follow up, with me, using a phone visit, medication therapy management.    It was great speaking with you today.  I value your experience and would be very thankful for your time in providing feedback in our clinic survey. In the next few days, you may receive an email or text message from [employer] with a link to a survey related to your  clinical pharmacist.\"     To schedule another MTM appointment, please call the clinic directly or you may call the MTM scheduling line at [phone] or toll-free at [phone].     My Clinical Pharmacist's contact information:                                                      Please feel free to contact me with any questions or concerns you have.      Roxann Hdz, PharmD  Medication Therapy Management Resident, [facility]  Pager: [phone]  Email: [email or web address]      "

## 2024-05-16 DIAGNOSIS — H10.32 ACUTE BACTERIAL CONJUNCTIVITIS OF LEFT EYE: ICD-10-CM

## 2024-05-16 RX ORDER — OFLOXACIN 3 MG/ML
1-2 SOLUTION/ DROPS OPHTHALMIC 4 TIMES DAILY
Qty: 5 ML | Refills: 0 | Status: SHIPPED | OUTPATIENT
Start: 2024-05-16 | End: 2024-08-01

## 2024-05-20 ENCOUNTER — TELEPHONE (OUTPATIENT)
Dept: GASTROENTEROLOGY | Facility: CLINIC | Age: 69
End: 2024-05-20
Payer: COMMERCIAL

## 2024-05-20 NOTE — TELEPHONE ENCOUNTER
Caller: Writer to patient     Reason for Reschedule/Cancellation   (please be detailed, any staff messages or encounters to note?): Patient forgot to do prep. Patient will call back to reschedule.      Prior to reschedule please review:  Ordering Provider:     Sherwin Mejia DO [facility]     Sedation Determined: moderate  Does patient have any ASC Exclusions, please identify?: n      Notes on Cancelled Procedure:  Procedure: Lower Endoscopy [Colonoscopy]   Date: 05/20/2024  Location: [facility]; [facility] [address] [place] [address]   Surgeon: Jannet      Rescheduled: No,         Did you cancel or rescheduled an EUS procedure? No.

## 2024-05-21 ENCOUNTER — TRANSFERRED RECORDS (OUTPATIENT)
Dept: HEALTH INFORMATION MANAGEMENT | Facility: CLINIC | Age: 69
End: 2024-05-21
Payer: COMMERCIAL

## 2024-05-28 ENCOUNTER — TELEPHONE (OUTPATIENT)
Dept: FAMILY MEDICINE | Facility: CLINIC | Age: 69
End: 2024-05-28
Payer: COMMERCIAL

## 2024-05-28 DIAGNOSIS — E11.69 TYPE 2 DIABETES MELLITUS WITH OTHER SPECIFIED COMPLICATION, WITHOUT LONG-TERM CURRENT USE OF INSULIN (H): Primary | ICD-10-CM

## 2024-05-28 RX ORDER — INSULIN GLARGINE 100 [IU]/ML
54 INJECTION, SOLUTION SUBCUTANEOUS AT BEDTIME
Qty: 75 ML | Refills: 3 | Status: SHIPPED | OUTPATIENT
Start: 2024-05-28

## 2024-05-28 RX ORDER — INSULIN GLARGINE 100 [IU]/ML
60 INJECTION, SOLUTION SUBCUTANEOUS AT BEDTIME
Qty: 75 ML | Refills: 3 | Status: SHIPPED | OUTPATIENT
Start: 2024-05-28 | End: 2024-05-28

## 2024-05-28 NOTE — TELEPHONE ENCOUNTER
I am entering this request as a Patient Encounter because Basjude diazs is NOT yet listed on Erickson's med list.    I am in process of adding BASAGLAR kwik pens to Erickson's current enrollment with the [facility] assistance program. [facility] requires a hand signed, brand name, hard copy script be submitted to add this medication to his enrollment.    Please hand sign a BRAND name, hard copy script for:    BASAGLRENATA KWOK PENS, u100, 54 units at bedtime, max 60 units daily, 5 boxes/75mL, 2 refills    Please send the HARD COPY script to ME at--   I must submit this script to the assistance program.    via interoffice mail  [employer] Barb Navarro     or via [place] mail  at:   [facility]  Barb Huerta  [address] [place]  [address]    Thanks so much for your help!    Barb Huerta  Prescription Assistance Supervisor  Pharmacy Assistance

## 2024-05-28 NOTE — TELEPHONE ENCOUNTER
I put in the order and printed the prescription for Basaglar KwikPen 54 units daily, max 60 units.  I am sending this to you through interoffice mail.  I noticed that when I printed the prescription that Basaglar does not appear in the name of the printed prescription, even though it is on the original order.  It seems to default to insulin glargine.  Hopefully this will not be a problem.  It does state that Basaglar may be substituted for this prescription.  Thank you, DE

## 2024-05-29 DIAGNOSIS — Z79.4 TYPE 2 DIABETES MELLITUS WITH HYPERGLYCEMIA, WITH LONG-TERM CURRENT USE OF INSULIN (H): ICD-10-CM

## 2024-05-29 DIAGNOSIS — E11.65 TYPE 2 DIABETES MELLITUS WITH HYPERGLYCEMIA, WITH LONG-TERM CURRENT USE OF INSULIN (H): ICD-10-CM

## 2024-05-29 RX ORDER — KETOROLAC TROMETHAMINE 30 MG/ML
1 INJECTION, SOLUTION INTRAMUSCULAR; INTRAVENOUS
Qty: 1 EACH | Refills: 0 | Status: SHIPPED | OUTPATIENT
Start: 2024-05-29 | End: 2024-08-01

## 2024-05-29 NOTE — TELEPHONE ENCOUNTER
Medication Question or Refill    Contacts         Type Contact Phone/Fax    05/29/2024 03:23 PM CDT Phone (Incoming) Erickson Hernández (Self) [phone] (M)            What medication are you calling about (include dose and sig)?: Freestyle Gabriel 3 Sensor 1 each once for 1 dose Change every 14 days     Preferred Pharmacy:   [facility] [address], [address] or pt would like rx shipped directly to him due to upcoming surgery. Stated he wont be able to get out of the house easily?    Controlled Substance Agreement on file:   CSA -- Patient Level:    CSA: None found at the patient level.       Who prescribed the medication?: Dr Garcia    Do you need a refill? Yes    When did you use the medication last? Currently has one on arm. Will end in 6 days    Patient offered an appointment? No     Do you have any questions or concerns?  No      Could we send this information to you in [facility] or would you prefer to receive a phone call?:   Patient would prefer a phone call   Okay to leave a detailed message?: Yes at Cell number on file:    Telephone Information:   Mobile [phone]

## 2024-05-29 NOTE — TELEPHONE ENCOUNTER
This should work just fine. As long as Basaglar is listed as an option, [facility] should accept it.    Thanks again!    Barb Huerta  Prescription Assistance Supervisor  Pharmacy Assistance

## 2024-06-04 ENCOUNTER — PATIENT OUTREACH (OUTPATIENT)
Dept: CARE COORDINATION | Facility: CLINIC | Age: 69
End: 2024-06-04
Payer: COMMERCIAL

## 2024-06-04 ENCOUNTER — TELEPHONE (OUTPATIENT)
Dept: FAMILY MEDICINE | Facility: CLINIC | Age: 69
End: 2024-06-04
Payer: COMMERCIAL

## 2024-06-04 ASSESSMENT — ACTIVITIES OF DAILY LIVING (ADL): DEPENDENT_IADLS:: CLEANING

## 2024-06-04 NOTE — PROGRESS NOTES
Clinic Care Coordination Contact  Clinic Care Coordination Contact  OUTREACH with Post Discharge Assessment    Referral Information: RN GAVINO contacted patient for post-hospital follow up.   Referral Source: IP Report    Primary Diagnosis: Psychosocial    Chief Complaint   Patient presents with    Clinic Care Coordination - Follow-up    Clinic Care Coordination - Post Hospital      Universal Utilization: Risk of admission or ED visit 96%  Clinic Utilization  Difficulty keeping appointments:: No  Compliance Concerns: No  No-Show Concerns: No  No PCP office visit in Past Year: No  Utilization      No Show Count (past year)  7             ED Visits  2             Hospital Admissions  2                    Current as of: 6/4/2024  8:40 AM              Clinical Concerns:  Current Medical Concerns:  Pain control, obtaining home care services.     Current Behavioral Concerns: None    Education Provided to patient: Discussed dosing of pain medications.    Pain  Pain (GOAL):: No  Health Maintenance Reviewed: Due/Overdue (Did not discuss during TCM outreach.)  Clinical Pathway: None    Transitions of Care Outreach  [facility] Intensive Care  Most Recent Admission Date: 5/31/24  Most Recent Admission Diagnosis: Oblique Lumbar Interbody Fusion    Most Recent Discharge Date: 6/1/24  Most Recent Discharge Diagnosis: Oblique Lumbar Interbody Fusion    Transitions of Care Assessment    Discharge Assessment  How are you doing now that you are home?: Dealing with pain. Can't put ice pack inside of brace on my own.  How are your symptoms? (Red Flag symptoms escalate to triage hotline per guidelines): Improved  Do you know how to contact your clinic care team if you have future questions or changes to your health status? : Yes  Does the patient have their discharge instructions? : Yes  Does the patient have questions regarding their discharge instructions? : No  Were you started on any new medications or were there changes to any of  your previous medications? : Yes  Does the patient have all of their medications?: Yes  Do you have questions regarding any of your medications? : Yes (see comment) (Discuss timing of doses.)  Do you have all of your needed medical supplies or equipment (DME)?  (i.e. oxygen tank, CPAP, cane, etc.): Yes    Post-op (CHW CTA Only)  If the patient had a surgery or procedure, do they have any questions for a nurse?: No    Post-op (Clinicians Only)  Did the patient have surgery or a procedure: Yes  Incision: closed  Drainage: No  Bleeding: none  Fever: No  Chills: No  Redness: No  Warmth: No  Swelling: No  Incision site pain: No  Closure: suture  Eating & Drinking: eating and drinking without complaints/concerns  PO Intake:  (Diabetic diet)  Bowel Function: normal  Urinary Status: voiding without complaint/concerns    Care Management       Care Mgmt General Assessment  Referral  Referral Source: IP Report  Health Care Home/Utilization  Preferred Hospital: [facility]  [phone]  Preferred Urgent Care: [facility] [phone]  Living Situation  Current living arrangement:: I live alone  Type of residence:: Apartment - handicap accessible  Resources  Patient receiving home care services:: No  Community Resources: None  Supplies Currently Used at Home: None  Equipment Currently Used at Home: cane, straight;walker, rolling;grab bar, tub/shower  Referrals Placed: Community Resources  Employment Status: employed part-time  Psychosocial  Orthodoxy or spiritual beliefs that impact treatment:: No  Mental health DX:: Yes  Mental health DX how managed:: Medication  Mental health management concern (GOAL):: No  Chemical Dependency Status: No Current Concerns  Chemical Dependency Management:  (N/A)  Informal Support system:: Children;Friends (States children don't really help out)  Functional Status  Dependent ADLs:: Independent  Dependent IADLs:: Cleaning  Bed or wheelchair  confined:: No  Mobility Status: Independent w/Device  Fallen 2 or more times in the past year?: No  Any fall with injury in the past year?: No  Advance Care Plan/Directive  Advanced Care Plans/Directives on file:: No  Discussed with patient/caregiver:: Other (Comment) (Did not discuss during TCM outreach.) and     Care Mgmt Encounter Assessment  Preventative Care  Routine Health maintenance Reviewed: Due/Overdue (Did not discuss during TCM outreach.)  Clinic Utilization  Difficulty keeping appointments:: No  Compliance Concerns: No  No-Show Concerns: No  No PCP office visit in Past Year: No  Transportation  Transportation means:: Regular car     Primary Diagnosis  Primary Diagnosis: Psychosocial  Barriers in Communication  Other concerns:: Physical impairment  Pain  Pain (GOAL):: No  Medication Review  Medication adherence problem (GOAL):: No  Knowledgeable about how to use meds:: Yes  Medication side effects suspected:: No  Diet/Exercise/Sleep  Diet:: Diabetic diet  Inadequate nutrition (GOAL):: No  Tube Feeding: No  Inadequate activity/exercise (GOAL):: No  Significant changes in sleep pattern (GOAL): No    Medication Management:  Medication review status: Medications reviewed prior to discharge on 6/1/24. Did not re-review during TCM outreach.   Current Outpatient Medications   Medication Sig Dispense Refill    acetaminophen (TYLENOL) 500 MG tablet Take 1,000 mg by mouth every 8 hours as needed      albuterol (PROAIR HFA/PROVENTIL HFA/VENTOLIN HFA) 108 (90 Base) MCG/ACT inhaler Inhale 2 puffs into the lungs every 6 hours as needed for shortness of breath, wheezing or cough 18 g 0    aspirin (ASPIRIN LOW DOSE) 81 MG tablet Take 1 tablet (81 mg) by mouth daily 30 tablet 3    atorvastatin (LIPITOR) 20 MG tablet Take 1.5 tablets (30 mg) by mouth daily 135 tablet 3    Bioflavonoid Products (CANDIDO-C) TABS Take 1,000 mg by mouth daily 90 tablet 3    bisacodyl (DULCOLAX) 5 MG EC tablet Two days prior to exam take two (2)  tablets at 4pm. One day prior to exam take two (2) tablets at 4pm 4 tablet 0    buPROPion (WELLBUTRIN XL) 150 MG 24 hr tablet Take 1 tablet (150 mg) by mouth every morning 90 tablet 3    calcium carbonate (OS-LUIS) 1500 (600 Ca) MG tablet Take 600 mg by mouth daily      cariprazine (VRAYLAR) 4.5 MG capsule Take 1 capsule (4.5 mg) by mouth daily 90 capsule 3    Continuous Glucose  (FREESTYLE VERA 3 READER) MARAL 1 each every 14 days To test blood sugar 1 each 0    DULoxetine (CYMBALTA) 30 MG capsule Take 1 capsule (30 mg) by mouth 2 times daily 180 capsule 3    Folic Acid-Vit B6-Vit B12 0.5-5-0.2 MG TABS Take 1 tablet by mouth daily 90 tablet 3    furosemide (LASIX) 20 MG tablet Take 2 tablets (40 mg) daily x 7 days. Then take 1  tablet (20 mg) daily 90 tablet 0    glucose 40 % (400 mg/mL) gel Take by mouth every 15 minutes as needed for low blood sugar      hydrochlorothiazide (HYDRODIURIL) 25 MG tablet Take 1.5 tablets (37.5 mg) by mouth daily 135 tablet 3    insulin glargine 100 UNIT/ML pen Inject 54 Units Subcutaneous at bedtime (Max 60 units) 75 mL 3    insulin pen needle (31G X 8 MM) 31G X 8 MM miscellaneous Use one pen needles daily or as directed. 100 each 1    loperamide (IMODIUM) 2 MG capsule Take 1 capsule (2 mg) by mouth 4 times daily as needed for diarrhea      losartan (COZAAR) 100 MG tablet Take 1 tablet (100 mg) by mouth daily 90 tablet 3    medication given by implanted intrathecal pump continuous Drug # 1: Fentanyl (Sublimaze) - Conc: 2000 mcg/mL - Total Dose / 24 hours: 1663.3 mcg  Drug # 2: Bupivacaine (Marcaine)  - Conc: 20 mg/mL - Total Dose / 24 hours: 16.633 mg  Drug # 3: Morphine (Duramorph or Infumorph)  - Conc: 9.6 mg/mL - Total Dose / 24 hours: 7.9836 mg    Pump Reservoir Volume: 40 mL  Pump Reservoir Alarm Volume: 2 ml  Outside Clinic & Provider: [facility]  Last Refill Date: 12/21/23  Next Refill Date: 1/28/24  Low [place] Alarm Date:  Pump :  Medtronic SynchroMed II    Boluses: Up to 3 per day, 4 minute duration. Lock-out every 6 hours (also has dose restriction interval noted 1 per 8 hours)   Fentanyl: 110.1 mcg/dose  Bupivacaine: 1.101 mg/dose  Morphine: 0.5287 mg/dose      metFORMIN (GLUCOPHAGE XR) 500 MG 24 hr tablet Take 2 tablets (1,000 mg) by mouth 2 times daily (with meals) 360 tablet 3    metoprolol succinate ER (TOPROL XL) 100 MG 24 hr tablet Take 1 tablet (100 mg) by mouth daily 90 tablet 3    modafinil (PROVIGIL) 200 MG tablet Take 2 tablets (400 mg) by mouth daily Pt has increased his dose to 400mg daily from 300mg daily.  Has a message in to his physician for a dose increase. 60 tablet 2    mometasone (ELOCON) 0.1 % external cream Apply topically daily as needed For psoriasis on face      multivitamin w/minerals (THERA-VIT-M) tablet Take 1 tablet by mouth daily Men's 50+      nitroGLYcerin (NITROSTAT) 0.4 MG sublingual tablet Place 1 tablet (0.4 mg) under the tongue every 5 minutes as needed for chest pain For chest pain place 1 tablet under the tongue every 5 minutes for 3 doses. If symptoms persist 5 minutes after 1st dose call 911. 30 tablet 1    ofloxacin (OCUFLOX) 0.3 % ophthalmic solution PLACE 1-2 DROPS INTO THE LEFT EYE 4 TIMES DAILY (FOR 5-7 DAYS, THEN STOP) 5 mL 0    polyethylene glycol (GOLYTELY) 236 g suspension Take as directed. Two days before your exam fill the first container with water. Cover and shake until mixed well. At 5:00pm drink one 8oz glass every 10-15 minutes until half (1/2) of the first container is empty. Store the remainder in the refrigerator. One day before your exam at 5:00pm drink the second half of the first container until it is gone. Before you go to bed mix the second container with water and put in refrigerator. Six hours before your check in time drink one 8oz glass every 10-15 minutes until half of container is empty. Discard the remainder of solution. 8000 mL 0    POTASSIUM PO Take 1 tablet by  mouth daily Unspecified tablet strength; patient estimated 600 mg, takes for leg cramps      pregabalin (LYRICA) 100 MG capsule Take 1 capsule (100 mg) by mouth at bedtime      semaglutide (OZEMPIC) 2 MG/3ML pen Inject 0.5 mg Subcutaneous every 7 days 3 mL 2    senna-docusate (SENOKOT-S/PERICOLACE) 8.6-50 MG tablet Take 1-2 tablets by mouth 2 times daily as needed      SKYRIZI  MG/ML subcutaneous Inject 150 mg Subcutaneous once      SUMAtriptan (IMITREX) 50 MG tablet Take 1 tablet (50 mg) by mouth at onset of headache for migraine May repeat in 2 hours. Max 4 tablets/24 hours. 8 tablet 1    tamsulosin (FLOMAX) 0.4 MG capsule Take 1 capsule (0.4 mg) by mouth 2 times daily 180 capsule 3    testosterone (ANDROGEL/TESTIM) 50 MG/5GM (1%) topical gel Place 1 packet (50 mg of testosterone) onto the skin daily 30 packet 1    vitamin B-Complex Take 3 tablets by mouth daily      vitamin D3 (CHOLECALCIFEROL) 50 mcg (2000 units) tablet Take 1 tablet (50 mcg) by mouth daily 90 tablet 3     No current facility-administered medications for this visit.      Allergies   Allergen Reactions    Amoxicillin Diarrhea    Cephalexin Diarrhea    Liraglutide Other (See Comments), Headache and Unknown     Other reaction(s): Headache, Unknown  PN: migraines - Increased migraine frequency and severity      Sulfa Antibiotics Angioedema and Swelling     Pt has taken  Taken sulfa drugs orally without trouble. He had problems with Sulfa eye drops. Eye swelled up       Functional Status:  Dependent ADLs:: Independent  Dependent IADLs:: Cleaning  Bed or wheelchair confined:: No  Mobility Status: Independent w/Device  Fallen 2 or more times in the past year?: No  Any fall with injury in the past year?: No    Living Situation:  Current living arrangement:: I live alone  Type of residence:: Apartment - handicap accessible    Lifestyle & Psychosocial Needs:    Social Determinants of Health     Food Insecurity: No Food Insecurity (5/30/2024)     Received from [facility]    Hunger Vital Sign     Worried About Running Out of Food in the Last Year: Never true     Ran Out of Food in the Last Year: Never true   Depression: Not at risk (5/6/2024)    PHQ-2     PHQ-2 Score: 2   Housing Stability: High Risk (5/30/2024)    Received from [facility]    Housing Stability Vital Sign     Unable to Pay for Housing in the Last Year: Yes     Number of Times Moved in the Last Year: 0     Homeless in the Last Year: No   Tobacco Use: Low Risk  (5/30/2024)    Received from [facility]    Patient History     Smoking Tobacco Use: Never     Smokeless Tobacco Use: Never     Passive Exposure: Not on file   Financial Resource Strain: Low Risk  (4/2/2024)    Financial Resource Strain     Within the past 12 months, have you or your family members you live with been unable to get utilities (heat, electricity) when it was really needed?: No   Alcohol Use: Not At Risk (12/24/2020)    Received from [facility]    AUDIT-C     Frequency of Alcohol Consumption: Never     Average Number of Drinks: Not on file     Frequency of Binge Drinking: Not on file     : Not on file     : Not on file     : Not on file   Transportation Needs: No Transportation Needs (5/30/2024)    Received from [facility]    Transportation Needs     In the past 12 months, has lack of transportation kept you from medical appointments, meetings, work, or from getting medicines or things needed for daily living?: No   Physical Activity: Inactive (4/2/2024)    Exercise Vital Sign     Days of Exercise per Week: 0 days     Minutes of Exercise per Session: 0 min   Interpersonal Safety: Not At Risk (5/30/2024)    Received from [facility]    Intimate Partner Violence     Are you in a relationship where you are physically hurt, threatened and/or made to feel afraid?: No   Stress: Stress Concern Present (4/2/2024)    [place]  [place] [employer] - Occupational Stress Questionnaire     Feeling of Stress : To some extent   Social Connections: Unknown (4/2/2024)    Social Connection and Isolation Panel [NHANES]     Frequency of Communication with Friends and Family: Not on file     Frequency of Social Gatherings with Friends and Family: More than three times a week     Attends Synagogue Services: Not on file     Active Member of Clubs or Organizations: Not on file     Attends Club or Organization Meetings: Not on file     Marital Status: Not on file   Health Literacy: Not on file     Diet:: Diabetic diet  Inadequate nutrition (GOAL):: No  Tube Feeding: No  Inadequate activity/exercise (GOAL):: No  Significant changes in sleep pattern (GOAL): No  Transportation means:: Regular car     Synagogue or spiritual beliefs that impact treatment:: No  Mental health DX:: Yes  Mental health DX how managed:: Medication  Mental health management concern (GOAL):: No  Chemical Dependency Status: No Current Concerns  Chemical Dependency Management:  (N/A)  Informal Support system:: Children, Friends (States children don't really help out)      Resources and Interventions:  Current Resources:      Community Resources: None  Supplies Currently Used at Home: None  Equipment Currently Used at Home: cane, straight, walker, rolling, grab bar, tub/shower  Employment Status: employed part-time     Advance Care Plan/Directive  Advanced Care Plans/Directives on file:: No  Discussed with patient/caregiver:: Other (Comment) (Did not discuss during TCM outreach.)    Referrals Placed: Community Resources     Care Plan:   Care Plan: Community Resources       Problem: Lack of transportation               Problem: Unable to prepare meals               Problem: Reliable food source               Problem: Insufficient In-home support       Goal: Establish adequate home support       Start Date: 1/10/2024 Expected End Date: 7/10/2024    This Visit's Progress: 30%  Recent Progress: 20%    Note:     Barriers: Mobility Issues  Strengths: Connected with Care Coordination  Patient expressed understanding of goal: Yes  Action steps to achieve this goal:  1. I will review housekeeping resource sent by [facility] (sent initially 1/10/24 and then resent via [employer] 3/28/24)  2. I will connect with this resource if I feel it will be helpful  3. I will remain in communication with Care Coordination about barriers I encounter or additional resources needed     Goal updated 4/29/24                            Care Plan: General       Problem: HP GENERAL PROBLEM       Goal: Obtain Replacement CPAP       Start Date: 1/10/2024 Expected End Date: 7/10/2024    This Visit's Progress: 20% Recent Progress: 10%    Note:     Barriers: Misplaced CPAP  Strengths: Connected with Care Coordination  Patient expressed understanding of goal: yes  Action steps to achieve this goal:  1. I will contact [facility] [employer] ([phone]) to request a replacement machine  2. I will remain in communication with Care Coordination about additional resources needed or barriers I encounter                              Patient/Caregiver understanding: Patient/caregiver verbalized understanding and denies any additional questions or concerns at this time. RNCC engaged in [employer] communications during encounter.     Outreach Frequency: monthly, more frequently as needed    Future Appointments                In 2 days Minda Smith, [facility],     In 1 month Charly Hester MD [facility]    In 1 month Dylan Jose MD [facility] Neurology Clin[facility]            Follow up Plan     Discharge Follow-Up  Discharge follow up appointment scheduled in alignment with recommended follow up timeframe or Transitions of Risk Category? (Low = within 30 days; Moderate= within 14 days; High= within 7 days): Yes  Discharge  Follow Up Appointment Date: 07/03/24  Discharge Follow Up Appointment Scheduled with?: Specialty Care Provider ([facility])    RN CC contacted patient for post-hospital follow up and to introduce Care Coordination. Full assessment not indicated as patient is already enrolled in Care Coordination.     Future Appointments   Date Time Provider Department Center   6/6/2024 10:00 AM MarlaMinda schaefer, [facility]   7/5/2024 12:00 PM Charly Hester MD [facility]   7/9/2024  8:00 AM Dylan Jose MD [facility]     Outpatient Plan as outlined on AVS reviewed with patient.    For any urgent concerns, please contact our 24 hour nurse triage line: [phone] ([phone])       Angela Hdz RN, BSN, CPHN, CCM  [facility] Ambulatory Care Management  [facility]  (On behalf of Chantel DENNIS)  Chantel DENNIS [facility]  [phone]   [email or web address]    Clinic Care Coordination Contact  Follow Up Progress Note      Assessment: RN CC contacted patient for monthly outreach. Just had back surgery (spinal fusion-see TCM note above). He's still having back pain. Minimally invasive surgery so recovery should take less time. Discussed dosing of his pain medications (Percocet, Robaxin, Atarax).  He has been having difficulty getting the ice pack inside the brace. Bere - Pharmacist at [facility] ordered his Gabriel Freestyle sensors; should be coming tomorrow. He also mentioned that he was supposed to discuss home care services with the staff at the hospital. RN CC recommended he contact the surgeon, Dr. Thapa, to request orders for home care. Provided him with SONNY MANCIA contact information for assistance if needed.     Care Gaps:    Health Maintenance Due   Topic Date Due    CONTROLLED SUBSTANCE AGREEMENT FOR CHRONIC PAIN MANAGEMENT  Never done    RSV  VACCINE (Pregnancy & 60+) (1 - 1-dose 60+ series) Never done    COLORECTAL CANCER SCREENING  08/01/2021    HEPATITIS A IMMUNIZATION (2 of 2 - Risk 2-dose series) 11/16/2022    URINE DRUG SCREEN  04/17/2024     Postponed until after post-op follow up.     Care Plans  Care Plan: Community Resources       Problem: Lack of transportation               Problem: Unable to prepare meals               Problem: Reliable food source               Problem: Insufficient In-home support       Goal: Establish adequate home support       Start Date: 1/10/2024 Expected End Date: 7/10/2024    This Visit's Progress: 30% Recent Progress: 20%    Note:     Barriers: Mobility Issues  Strengths: Connected with Care Coordination  Patient expressed understanding of goal: Yes  Action steps to achieve this goal:  1. I will review housekeeping resource sent by [facility] (sent initially 1/10/24 and then resent via [employer] 3/28/24)  2. I will connect with this resource if I feel it will be helpful  3. I will remain in communication with Care Coordination about barriers I encounter or additional resources needed     Goal updated 4/29/24                            Care Plan: General       Problem: HP GENERAL PROBLEM       Goal: Obtain Replacement CPAP       Start Date: 1/10/2024 Expected End Date: 7/10/2024    This Visit's Progress: 20% Recent Progress: 10%    Note:     Barriers: Misplaced CPAP  Strengths: Connected with Care Coordination  Patient expressed understanding of goal: yes  Action steps to achieve this goal:  1. I will contact [facility] [employer] ([phone]) to request a replacement machine  2. I will remain in communication with Care Coordination about additional resources needed or barriers I encounter                              Intervention/Education provided during outreach: Discussed patients plan of care. CC RN asked open ended questions, provided support, resources, and encouragement as needed. Patient will reach out to  care team sooner than planned with new questions or concerns.     Plan: Care Coordinator will continue to follow patient for any additional needs.     Care Coordinator will follow up in 1 month.     Angela Hdz RN, BSN, CPHN, CCM  [facility] Ambulatory Care Management  [facility]  (On behalf of Chantel DENNIS)   Chantel DENNIS [facility]  [phone]   [email or web address]

## 2024-06-04 NOTE — TELEPHONE ENCOUNTER
A request has been made to the [facility] assistance program to add Basaglar to current enrollment.     We will note EPIC when in process.     Thank you!    Anne Marie Powell   Prescription Assistance Assistant     May shower   No heavy lifting maximum 20 pounds for 4 weeks.  No strenuous activity for 2 weeks.  Follow-up in 2 weeks please call the office for appointment.  Please call if you have fevers, chills, increasing abdominal pain.    Stay On Bariatric Full Liquid Diet and Soft foods for 2 weeks

## 2024-06-05 ENCOUNTER — PATIENT OUTREACH (OUTPATIENT)
Dept: CARE COORDINATION | Facility: CLINIC | Age: 69
End: 2024-06-05
Payer: COMMERCIAL

## 2024-06-05 NOTE — PROGRESS NOTES
Clinic Care Coordination Contact  Care Coordination Clinician Chart Review    Situation: Patient chart reviewed by Care Coordinator.       Background: Care Coordination Program started: 12/28/2023. Initial assessment completed and patient-centered care plan(s) were developed with participation from patient. Lead CC handed patient off to CHW for continued outreaches.       Assessment: Per chart review, patient outreach completed by CC CHW on 4/29.  Patient is actively working to accomplish goal(s). Patient's goal(s) appropriate and relevant at this time. Patient is not due for updated Plan of Care.  Assessments will be completed annually or as needed/with change of patient status.      Care Plan: Community Resources       Problem: Lack of transportation               Problem: Unable to prepare meals               Problem: Reliable food source               Problem: Insufficient In-home support       Goal: Establish adequate home support       Start Date: 1/10/2024 Expected End Date: 7/10/2024    This Visit's Progress: 30% Recent Progress: 20%    Note:     Barriers: Mobility Issues  Strengths: Connected with Care Coordination  Patient expressed understanding of goal: Yes  Action steps to achieve this goal:  1. I will review housekeeping resource sent by [facility] (sent initially 1/10/24 and then resent via [employer] 3/28/24)  2. I will connect with this resource if I feel it will be helpful  3. I will remain in communication with Care Coordination about barriers I encounter or additional resources needed     Goal updated 4/29/24                            Care Plan: General       Problem: HP GENERAL PROBLEM       Goal: Obtain Replacement CPAP       Start Date: 1/10/2024 Expected End Date: 7/10/2024    This Visit's Progress: 20% Recent Progress: 10%    Note:     Barriers: Misplaced CPAP  Strengths: Connected with Care Coordination  Patient expressed understanding of goal: yes  Action steps to achieve this goal:  1. I will  contact [employer] ([phone]) to request a replacement machine  2. I will remain in communication with Care Coordination about additional resources needed or barriers I encounter                                   Plan/Recommendations: The patient will continue working with Care Coordination to achieve goal(s) as above. CHW will continue outreaches at minimum every 30 days and will involve Lead CC as needed or if patient is ready to move to Maintenance. Lead CC will continue to monitor CHW outreaches and patient's progress to goal(s) every 6 weeks.     Plan of Care updated and sent to patient: Qing Montgomery [facility] [employer]  Phone: [phone]  E-mail: [email or web address]

## 2024-06-11 ENCOUNTER — TELEPHONE (OUTPATIENT)
Dept: PHARMACY | Facility: CLINIC | Age: 69
End: 2024-06-11
Payer: COMMERCIAL

## 2024-06-11 NOTE — TELEPHONE ENCOUNTER
Erickson Hackett called.   Wanting to speak w/ you.   Did not provide any details.   Thanks!  Ifeoma ALEX

## 2024-06-11 NOTE — TELEPHONE ENCOUNTER
Returned call and was able to speak to Erickson.     Patient has been experiencing pain while recovering from his recent surgery. Patient stated that he was out of his Lyrica.     It looks like some of his post-surgery medications were sent to [facility] ([address]) in stead of his [place] pharmacy.  By provider Phyllis Everett PA-C and Oziel Matthew MD for oxycodone-acetaminophen, Lyrica, and Methocarbamol.   Discussed that the controls would be unable to be transferred between pharmacies and to check with [facility] to make sure medications were still available for pick-up.     Was also able to reschedule an MTM visit for 06/25/24 at 2 PM    Roxann Hdz RPH on 6/11/2024 at 10:07 AM

## 2024-06-23 ENCOUNTER — HOSPITAL ENCOUNTER (EMERGENCY)
Facility: CLINIC | Age: 69
Discharge: HOME | End: 2024-06-24
Attending: EMERGENCY MEDICINE | Admitting: EMERGENCY MEDICINE
Payer: COMMERCIAL

## 2024-06-23 DIAGNOSIS — I89.0 LYMPHEDEMA: ICD-10-CM

## 2024-06-23 DIAGNOSIS — L03.90 CELLULITIS, UNSPECIFIED CELLULITIS SITE: ICD-10-CM

## 2024-06-23 DIAGNOSIS — Z73.9 DIFFICULTY WITH LIFE MANAGEMENT: ICD-10-CM

## 2024-06-23 DIAGNOSIS — M54.50 LOW BACK PAIN: Primary | ICD-10-CM

## 2024-06-23 LAB
ACANTHOCYTES BLD QL SMEAR: NORMAL
ALBUMIN SERPL BCG-MCNC: 4.3 G/DL (ref 3.5–5.2)
ALP SERPL-CCNC: 243 U/L (ref 40–150)
ALT SERPL W P-5'-P-CCNC: 54 U/L (ref 0–70)
ANION GAP SERPL CALCULATED.3IONS-SCNC: 8 MMOL/L (ref 7–15)
AST SERPL W P-5'-P-CCNC: 39 U/L (ref 0–45)
AUER BODIES BLD QL SMEAR: NORMAL
BASO STIPL BLD QL SMEAR: NORMAL
BASOPHILS # BLD AUTO: 0 10E3/UL (ref 0–0.2)
BASOPHILS NFR BLD AUTO: 1 %
BILIRUB SERPL-MCNC: 0.4 MG/DL
BITE CELLS BLD QL SMEAR: NORMAL
BLISTER CELLS BLD QL SMEAR: NORMAL
BUN SERPL-MCNC: 24 MG/DL (ref 8–23)
BURR CELLS BLD QL SMEAR: NORMAL
CALCIUM SERPL-MCNC: 8.9 MG/DL (ref 8.8–10.2)
CHLORIDE SERPL-SCNC: 98 MMOL/L (ref 98–107)
CREAT SERPL-MCNC: 1.17 MG/DL (ref 0.67–1.17)
DACRYOCYTES BLD QL SMEAR: NORMAL
DEPRECATED HCO3 PLAS-SCNC: 31 MMOL/L (ref 22–29)
EGFRCR SERPLBLD CKD-EPI 2021: 68 ML/MIN/1.73M2
ELLIPTOCYTES BLD QL SMEAR: NORMAL
EOSINOPHIL # BLD AUTO: 0.3 10E3/UL (ref 0–0.7)
EOSINOPHIL NFR BLD AUTO: 5 %
ERYTHROCYTE [DISTWIDTH] IN BLOOD BY AUTOMATED COUNT: 13 % (ref 10–15)
FRAGMENTS BLD QL SMEAR: NORMAL
GIANT PLATELETS BLD QL SMEAR: NORMAL
GLUCOSE SERPL-MCNC: 171 MG/DL (ref 70–99)
HCT VFR BLD AUTO: 39.1 % (ref 40–53)
HGB BLD-MCNC: 13.7 G/DL (ref 13.3–17.7)
HGB C CRYSTALS: NORMAL
HOLD SPECIMEN: NORMAL
HOWELL-JOLLY BOD BLD QL SMEAR: NORMAL
IMM GRANULOCYTES # BLD: 0 10E3/UL
IMM GRANULOCYTES NFR BLD: 0 %
LYMPHOCYTES # BLD AUTO: 1 10E3/UL (ref 0.8–5.3)
LYMPHOCYTES NFR BLD AUTO: 19 %
MCH RBC QN AUTO: 34.9 PG (ref 26.5–33)
MCHC RBC AUTO-ENTMCNC: 35 G/DL (ref 31.5–36.5)
MCV RBC AUTO: 100 FL (ref 78–100)
MONOCYTES # BLD AUTO: 0.3 10E3/UL (ref 0–1.3)
MONOCYTES NFR BLD AUTO: 6 %
NEUTROPHILS # BLD AUTO: 3.8 10E3/UL (ref 1.6–8.3)
NEUTROPHILS NFR BLD AUTO: 69 %
NEUTS HYPERSEG BLD QL SMEAR: NORMAL
NRBC # BLD AUTO: 0 10E3/UL
NRBC BLD AUTO-RTO: 0 /100
PATH REV: NORMAL
PLAT MORPH BLD: NORMAL
PLATELET # BLD AUTO: 176 10E3/UL (ref 150–450)
POLYCHROMASIA BLD QL SMEAR: NORMAL
POTASSIUM SERPL-SCNC: 4.2 MMOL/L (ref 3.4–5.3)
PROT SERPL-MCNC: 6.8 G/DL (ref 6.4–8.3)
RBC # BLD AUTO: 3.92 10E6/UL (ref 4.4–5.9)
RBC AGGLUT BLD QL: NORMAL
RBC MORPH BLD: NORMAL
ROULEAUX BLD QL SMEAR: NORMAL
SICKLE CELLS BLD QL SMEAR: NORMAL
SMUDGE CELLS BLD QL SMEAR: NORMAL
SODIUM SERPL-SCNC: 137 MMOL/L (ref 135–145)
SPHEROCYTES BLD QL SMEAR: NORMAL
STOMATOCYTES BLD QL SMEAR: NORMAL
TARGETS BLD QL SMEAR: NORMAL
TOXIC GRANULES BLD QL SMEAR: NORMAL
VARIANT LYMPHS BLD QL SMEAR: NORMAL
WBC # BLD AUTO: 5.5 10E3/UL (ref 4–11)

## 2024-06-23 PROCEDURE — 80053 COMPREHEN METABOLIC PANEL: CPT | Performed by: EMERGENCY MEDICINE

## 2024-06-23 PROCEDURE — 36415 COLL VENOUS BLD VENIPUNCTURE: CPT | Performed by: EMERGENCY MEDICINE

## 2024-06-23 PROCEDURE — 85025 COMPLETE CBC W/AUTO DIFF WBC: CPT | Performed by: EMERGENCY MEDICINE

## 2024-06-23 PROCEDURE — 99284 EMERGENCY DEPT VISIT MOD MDM: CPT | Mod: 25

## 2024-06-23 ASSESSMENT — COLUMBIA-SUICIDE SEVERITY RATING SCALE - C-SSRS
1. IN THE PAST MONTH, HAVE YOU WISHED YOU WERE DEAD OR WISHED YOU COULD GO TO SLEEP AND NOT WAKE UP?: NO
2. HAVE YOU ACTUALLY HAD ANY THOUGHTS OF KILLING YOURSELF IN THE PAST MONTH?: NO
6. HAVE YOU EVER DONE ANYTHING, STARTED TO DO ANYTHING, OR PREPARED TO DO ANYTHING TO END YOUR LIFE?: NO

## 2024-06-23 ASSESSMENT — ACTIVITIES OF DAILY LIVING (ADL)
ADLS_ACUITY_SCORE: 38
ADLS_ACUITY_SCORE: 38

## 2024-06-24 ENCOUNTER — APPOINTMENT (OUTPATIENT)
Dept: ULTRASOUND IMAGING | Facility: CLINIC | Age: 69
End: 2024-06-24
Attending: EMERGENCY MEDICINE
Payer: COMMERCIAL

## 2024-06-24 VITALS
TEMPERATURE: 98 F | SYSTOLIC BLOOD PRESSURE: 179 MMHG | BODY MASS INDEX: 40.98 KG/M2 | HEART RATE: 75 BPM | RESPIRATION RATE: 18 BRPM | WEIGHT: 255 LBS | OXYGEN SATURATION: 98 % | DIASTOLIC BLOOD PRESSURE: 101 MMHG | HEIGHT: 66 IN

## 2024-06-24 PROCEDURE — 250N000013 HC RX MED GY IP 250 OP 250 PS 637: Performed by: EMERGENCY MEDICINE

## 2024-06-24 PROCEDURE — 93970 EXTREMITY STUDY: CPT

## 2024-06-24 RX ORDER — CEPHALEXIN 500 MG/1
500 CAPSULE ORAL EVERY 6 HOURS SCHEDULED
Status: DISCONTINUED | OUTPATIENT
Start: 2024-06-24 | End: 2024-06-24 | Stop reason: HOSPADM

## 2024-06-24 RX ORDER — FUROSEMIDE 20 MG
20 TABLET ORAL DAILY
Status: DISCONTINUED | OUTPATIENT
Start: 2024-06-25 | End: 2024-06-24 | Stop reason: HOSPADM

## 2024-06-24 RX ORDER — TAMSULOSIN HYDROCHLORIDE 0.4 MG/1
0.4 CAPSULE ORAL 2 TIMES DAILY
Status: DISCONTINUED | OUTPATIENT
Start: 2024-06-24 | End: 2024-06-24 | Stop reason: HOSPADM

## 2024-06-24 RX ORDER — BUPROPION HYDROCHLORIDE 150 MG/1
150 TABLET ORAL DAILY
Status: DISCONTINUED | OUTPATIENT
Start: 2024-06-24 | End: 2024-06-24 | Stop reason: HOSPADM

## 2024-06-24 RX ORDER — DEXTROSE MONOHYDRATE 25 G/50ML
25-50 INJECTION, SOLUTION INTRAVENOUS
Status: DISCONTINUED | OUTPATIENT
Start: 2024-06-24 | End: 2024-06-24 | Stop reason: HOSPADM

## 2024-06-24 RX ORDER — ACETAMINOPHEN 500 MG
1000 TABLET ORAL EVERY 8 HOURS PRN
Status: DISCONTINUED | OUTPATIENT
Start: 2024-06-24 | End: 2024-06-24 | Stop reason: HOSPADM

## 2024-06-24 RX ORDER — CEPHALEXIN 500 MG/1
500 CAPSULE ORAL ONCE
Status: COMPLETED | OUTPATIENT
Start: 2024-06-24 | End: 2024-06-24

## 2024-06-24 RX ORDER — MODAFINIL 200 MG/1
400 TABLET ORAL DAILY
Status: DISCONTINUED | OUTPATIENT
Start: 2024-06-24 | End: 2024-06-24 | Stop reason: HOSPADM

## 2024-06-24 RX ORDER — PREGABALIN 100 MG/1
100 CAPSULE ORAL 3 TIMES DAILY
Status: DISCONTINUED | OUTPATIENT
Start: 2024-06-24 | End: 2024-06-24 | Stop reason: HOSPADM

## 2024-06-24 RX ORDER — OXYCODONE HYDROCHLORIDE 5 MG/1
10 TABLET ORAL ONCE
Status: DISCONTINUED | OUTPATIENT
Start: 2024-06-24 | End: 2024-06-24

## 2024-06-24 RX ORDER — METFORMIN HCL 500 MG
1000 TABLET, EXTENDED RELEASE 24 HR ORAL 2 TIMES DAILY WITH MEALS
Status: DISCONTINUED | OUTPATIENT
Start: 2024-06-24 | End: 2024-06-24 | Stop reason: HOSPADM

## 2024-06-24 RX ORDER — OXYCODONE HYDROCHLORIDE 5 MG/1
5 TABLET ORAL ONCE
Status: COMPLETED | OUTPATIENT
Start: 2024-06-24 | End: 2024-06-24

## 2024-06-24 RX ORDER — HYDROXYZINE HYDROCHLORIDE 25 MG/1
25 TABLET, FILM COATED ORAL 3 TIMES DAILY
Status: DISCONTINUED | OUTPATIENT
Start: 2024-06-24 | End: 2024-06-24 | Stop reason: HOSPADM

## 2024-06-24 RX ORDER — OXYCODONE HYDROCHLORIDE 5 MG/1
TABLET ORAL
Status: DISCONTINUED
Start: 2024-06-24 | End: 2024-06-24 | Stop reason: HOSPADM

## 2024-06-24 RX ORDER — METHOCARBAMOL 500 MG/1
500 TABLET, FILM COATED ORAL 3 TIMES DAILY PRN
Status: DISCONTINUED | OUTPATIENT
Start: 2024-06-24 | End: 2024-06-24 | Stop reason: HOSPADM

## 2024-06-24 RX ORDER — ASPIRIN 81 MG/1
81 TABLET, CHEWABLE ORAL DAILY
Status: DISCONTINUED | OUTPATIENT
Start: 2024-06-24 | End: 2024-06-24 | Stop reason: HOSPADM

## 2024-06-24 RX ORDER — METOPROLOL SUCCINATE 50 MG/1
50 TABLET, EXTENDED RELEASE ORAL DAILY
Status: DISCONTINUED | OUTPATIENT
Start: 2024-06-24 | End: 2024-06-24 | Stop reason: HOSPADM

## 2024-06-24 RX ORDER — OXYCODONE HYDROCHLORIDE 5 MG/1
5 TABLET ORAL ONCE
Status: DISCONTINUED | OUTPATIENT
Start: 2024-06-24 | End: 2024-06-24

## 2024-06-24 RX ORDER — DULOXETIN HYDROCHLORIDE 30 MG/1
30 CAPSULE, DELAYED RELEASE ORAL 2 TIMES DAILY
Status: DISCONTINUED | OUTPATIENT
Start: 2024-06-24 | End: 2024-06-24 | Stop reason: HOSPADM

## 2024-06-24 RX ORDER — NICOTINE POLACRILEX 4 MG
15-30 LOZENGE BUCCAL
Status: DISCONTINUED | OUTPATIENT
Start: 2024-06-24 | End: 2024-06-24 | Stop reason: HOSPADM

## 2024-06-24 RX ORDER — FUROSEMIDE 20 MG
20 TABLET ORAL ONCE
Status: COMPLETED | OUTPATIENT
Start: 2024-06-24 | End: 2024-06-24

## 2024-06-24 RX ADMIN — CARIPRAZINE 4.5 MG: 4.5 CAPSULE, GELATIN COATED ORAL at 11:44

## 2024-06-24 RX ADMIN — ASPIRIN 81 MG CHEWABLE TABLET 81 MG: 81 TABLET CHEWABLE at 11:43

## 2024-06-24 RX ADMIN — MODAFINIL 400 MG: 200 TABLET ORAL at 11:47

## 2024-06-24 RX ADMIN — DULOXETINE HYDROCHLORIDE 30 MG: 30 CAPSULE, DELAYED RELEASE ORAL at 11:44

## 2024-06-24 RX ADMIN — BUPROPION HYDROCHLORIDE 150 MG: 150 TABLET, EXTENDED RELEASE ORAL at 11:46

## 2024-06-24 RX ADMIN — CEPHALEXIN 500 MG: 500 CAPSULE ORAL at 11:43

## 2024-06-24 RX ADMIN — METFORMIN ER 500 MG 1000 MG: 500 TABLET ORAL at 11:45

## 2024-06-24 RX ADMIN — ACETAMINOPHEN 1000 MG: 500 TABLET, FILM COATED ORAL at 07:31

## 2024-06-24 RX ADMIN — OXYCODONE HYDROCHLORIDE 5 MG: 5 TABLET ORAL at 01:50

## 2024-06-24 RX ADMIN — METOPROLOL SUCCINATE 50 MG: 50 TABLET, EXTENDED RELEASE ORAL at 11:46

## 2024-06-24 RX ADMIN — TAMSULOSIN HYDROCHLORIDE 0.4 MG: 0.4 CAPSULE ORAL at 11:43

## 2024-06-24 RX ADMIN — HYDROXYZINE HYDROCHLORIDE 25 MG: 25 TABLET, FILM COATED ORAL at 11:42

## 2024-06-24 RX ADMIN — PREGABALIN 100 MG: 100 CAPSULE ORAL at 11:48

## 2024-06-24 RX ADMIN — OXYCODONE HYDROCHLORIDE 5 MG: 5 TABLET ORAL at 10:20

## 2024-06-24 RX ADMIN — FUROSEMIDE 20 MG: 20 TABLET ORAL at 05:31

## 2024-06-24 RX ADMIN — CEPHALEXIN 500 MG: 500 CAPSULE ORAL at 05:31

## 2024-06-24 ASSESSMENT — ACTIVITIES OF DAILY LIVING (ADL)
ADLS_ACUITY_SCORE: 38

## 2024-06-24 NOTE — ED NOTES
Of note: pt arrived with walking boot on right leg and ace bandages around both ankles, mepilex on the blisters of left leg, and krelex wrap on right big toe.

## 2024-06-24 NOTE — ED PROVIDER NOTES
"  Emergency Department Note      History of Present Illness     Chief Complaint  Leg Swelling    HPI  Parmjit Hernández is a 68 year old male with a history of type 2 diabetes mellitus, leg edema, hypertension, and hyperlipidemia who presents to the ED for bilateral leg swelling. On 6/20/24, the patient was discharged from [facility] following a 5 day hospital stay for leg swelling and redness. Since discharge, patient states that his legs are a little bit more swollen. The left is more swollen than the right. He has been wearing a compression boot on his right leg. He has also been elevating his legs. Patient notes his legs are also a bit more red. Of note, he states his podiatrist says there is no infection in his right foot. Patient has not been taking his medications as prescribed, including his Lasix and Duricef. He notes difficultly picking up and refilling his medications, as well as \"feeling overwhelmed\" by the number of medications. Patient denies any fevers, vomiting, chest pain, or shortness of breath. He endorses diarrhea, as well as chronic back pain for which he takes two oxycodone a day. He is not on blood thinners. No history of blood clots. He was checked for DVT a year ago.    Patient's friend notes that the patient has been increasingly more confused as of late. He states the patient has been having balance issues and is unable to cook adequately for himself.     Independent Historian  None    Review of External Notes  Reviewed discharge note from 6/20/24. Discharged on Duricef and Lasix.    Past Medical History   Medical History and Problem List  Degenerative disc disease  Opioid type dependence  Leg edema  Peripheral neuropathy  Spinal stenosis (L4-L5)  Thrombocytopenia  Cirrhosis of the liver  NSTEMI  Restless legs  Bipolar 1 disorder  GERD  Sleep apnea  Hyperlipidemia  Type 2 diabetes mellitus   Migraines  ADD  Anxiety  Depression  Obesity  Lymphedema  Amyloidosis  History of bone " "marrow transplant  Marianne  Narcotic dependence  Hypertension     Medications  Aspirin 81 mg  Atorvastatin  Furosemide  Lurasidone  Modafinil  Pregabalin  Sumatriptan  Tamsulosin  Divalproex  Sitagliptin  Metformin  Metoprolol succinate  Glimepiride  Lisinopril  Lorazepam  Methocarbamol  Oxycodone  Bisacodyl  Bupropion  Cariprazine  Hydrochlorothiazide  Insulin glargine  Losartan  Modafinil  Nitroglycerin    Surgical History   Abdomen surgery  Back surgery  Bone marrow biopsy  Cholecystectomy  Colonoscopy  EGD  Hernia repair    Physical Exam   Patient Vitals for the past 24 hrs:   BP Temp Temp src Pulse Resp SpO2 Height Weight   06/24/24 0517 (!) 178/99 -- -- 72 -- 98 % -- --   06/23/24 2110 -- 98  F (36.7  C) Oral -- -- -- -- --   06/23/24 2107 (!) 162/92 -- Oral 84 18 92 % 1.676 m (5' 6\") 115.7 kg (255 lb)     Physical Exam  General: Sitting up in bed  Eyes:  The pupils are equal and round    Conjunctivae and sclerae are normal  ENT:    Atraumatic face  Neck:  Normal range of motion  CV:  Regular rate,  regular rhythm     Skin warm and well perfused     DP/PT pulses 2+ bilaterally  Resp:  Non labored breathing on room air    No tachypnea    No cough heard    Lungs clear bilaterally  GI:  Abdomen is soft, there is no rigidity    No distension    No rebound tenderness     No abdominal tenderness  MS:  Bilateral lower extremity edema left greater than right  Skin:  Bilateral lower extremity erythema, left worse than right.  2 blisters on left calf.  Incisions on lower back healing with no erythema or drainage  Neuro:   Awake, alert.      Speech is normal and fluent.    Face is symmetric.     Moves all extremities equally    SILT on bilateral LE  Psych: Normal affect.  Appropriate interactions.      Diagnostics   Lab Results   Labs Ordered and Resulted from Time of ED Arrival to Time of ED Departure   COMPREHENSIVE METABOLIC PANEL - Abnormal       Result Value    Sodium 137      Potassium 4.2      Carbon Dioxide (CO2) " 31 (*)     Anion Gap 8      Urea Nitrogen 24.0 (*)     Creatinine 1.17      GFR Estimate 68      Calcium 8.9      Chloride 98      Glucose 171 (*)     Alkaline Phosphatase 243 (*)     AST 39      ALT 54      Protein Total 6.8      Albumin 4.3      Bilirubin Total 0.4     CBC WITH PLATELETS AND DIFFERENTIAL - Abnormal    WBC Count 5.5      RBC Count 3.92 (*)     Hemoglobin 13.7      Hematocrit 39.1 (*)           MCH 34.9 (*)     MCHC 35.0      RDW 13.0      Platelet Count 176      % Neutrophils 69      % Lymphocytes 19      % Monocytes 6      % Eosinophils 5      % Basophils 1      % Immature Granulocytes 0      NRBCs per 100 WBC 0      Absolute Neutrophils 3.8      Absolute Lymphocytes 1.0      Absolute Monocytes 0.3      Absolute Eosinophils 0.3      Absolute Basophils 0.0      Absolute Immature Granulocytes 0.0      Absolute NRBCs 0.0     RBC AND PLATELET MORPHOLOGY    RBC Morphology Confirmed RBC Indices      Platelet Assessment        Value: Automated Count Confirmed. Platelet morphology is normal.    Giant Platelets        Acanthocytes        Almaz Rods        Basophilic Stippling        Bite Cells        Blister Cells        Carlos Cells        Elliptocytes        Hgb C Crystals        Verdugo-Jolly Bodies        Hypersegmented Neutrophils        Polychromasia        RBC agglutination        RBC Fragments        Reactive Lymphocytes        Rouleaux        Sickle Cells        Smudge Cells        Spherocytes        Stomatocytes        Target Cells        Teardrop Cells        Toxic Neutrophils        Pathologist Review Comments (Blood)           Imaging  US Lower Extremity Venous Duplex Bilateral   Final Result   IMPRESSION:   1.  No deep venous thrombosis in the bilateral lower extremities.        ED Course    Medications Administered  Medications   oxyCODONE (ROXICODONE) 5 MG tablet (has no administration in time range)   oxyCODONE (ROXICODONE) tablet 5 mg (5 mg Oral $Given 6/24/24 0150)   furosemide  (LASIX) tablet 20 mg (20 mg Oral $Given 6/24/24 0531)   cephALEXin (KEFLEX) capsule 500 mg (500 mg Oral $Given 6/24/24 0531)     Social Determinants of Health adding to complexity of care  None    ED Course  ED Course as of 06/24/24 0626   Sun Jun 23, 2024   2306 I obtained history and examined the patient as noted above.     Mon Jun 24, 2024   0253 I rechecked and updated the patient.       Medical Decision Making / Diagnosis   CMS Diagnoses: None    MIPS     None    MDM  Parmjit Hernández is a 68 year old male who presented to the emergency department with leg swelling.  He reports that his right leg is less swollen than in the hospital but his left leg is slightly worse.  The erythema is about the same on discharge.  He has had no fever.  He looks well.  His blood work has normal white blood cell count.  Ultrasound showed no DVT.  I reviewed the pictures that were taken at [facility] in the hospital as well as pictures of his leg from May.  I do not think that his legs look much different than the pictures from [facility].  His legs actually look better from the picture compared to May.  I suspect that lymphedema is contributing significantly to his swelling and redness.  I am not convinced that he has an untreated cellulitis though he never picked up his antibiotics that he was supposed to take for 5 days.  He also did not feel his Lasix which he was also supposed to start.  I do not think this requires hospitalization.  Patient reports that he is unable to manage things at home and needs additional assistance.  At this point, I do think he would be long term care and so patient is waiting on social work consult in the morning.     Disposition  Care of the patient was transferred to my colleague Dr. Mann.  ICD-10 Codes:    ICD-10-CM    1. Lymphedema  I89.0       2. Difficulty with life management  Z73.9          Scribe Disclosure:  I, Jose C Dick, am serving as a scribe at 11:33 PM on 6/23/2024  to document services personally performed by Rebecca Duarte MD based on my observations and the provider's statements to me.        Rebecca Duarte MD  06/24/24 0623

## 2024-06-24 NOTE — CONSULTS
Care Management [facility] Outpatient Consult:    Care management consulted by ED provider for FCI assessment.  CM spoke with provider to discuss FCI case. Provider has confirmed patient is medically stable for discharge.    Background information : Patient presents to the emergency department with concerns for pain in his lower back and in his legs due to swelling from lymphedema. Patient has limited resources and relationship with his supports. Patient does not have a good relationship with his daughters who live in the [facility] area and his son is distant from him living in [place]. Patient describes social supports as his friend Stevo and Pavel. Patient asked writer to call for Stevo to come and  the patient. Writer called C agency that was set up from the previous hospital discharge. Their agency could only support start of care on Monday July 1st. Patient felt he would need assistance prior to this. Writer sent out referral in the Hub via [employer] to secure [facility]. Patient was able to confirm address, insurance, phone number, contacts and PCP listed.     Provider recommended discharge disposition:  Discharge home    Resources needed for discharge: HHC, Transportation home     Estimated timeline for needed resources: 0-4 hrs (ambulatory referral, county contacts, non-urgent home care)    Barriers to discharge: Patient is not comfortable with caring for himself at home. Patient has concerns about financial issues.     CM met/spoke with patient/family at bedside/via phone.  Discussed patient is medically stable for discharge from ED & medical insurance will likely not cover the hospital stay.  Discussed recommended discharge disposition and resources needed, included noted barriers.  Provided the following resources:       Discussed above with ED provider. Writer will contact ED when home care and transport are set up. Referral is out for home health care. Patient is set up with [employer] to start HHC  June 26th. MD will need to put in orders for RN, PT, OT, and SW and lymphademia for home care. Writer discussed plan that patient would either be transported by friend or a ride would be set up for the patient.    Next steps: Anticipate patient will discharge to Home with Home Health Care support/resources for follow up.      Writer called for wheelchair ride and patient did not have ride available until 6:00p.m. tonight. Writer asked bedside nurse to set up transport to see if she would be able to get an earlier time.      available at [phone] for follow up questions.     Galindo Phillips [facility]  Care Management

## 2024-06-24 NOTE — ED NOTES
Pt ambulatory to bathroom with walker independently - hospital bed placed in room for patient comfort. Urinal and call bell at bedside

## 2024-06-24 NOTE — ED TRIAGE NOTES
Pt had back surgery 3 weeks ago in [place]. Last week was seen at [facility] for leg swelling and told he had cellulitis. Pt unsure if he had abx on discharge. Pt reports leg swelling has not gotten better. Denies fevers/chills.

## 2024-06-24 NOTE — ED NOTES
SW received consult and is reviewing chart. Spoke with Bedside Nurse and MD regarding patient.     Galindo Phillips [facility]

## 2024-06-25 ENCOUNTER — VIRTUAL VISIT (OUTPATIENT)
Dept: PHARMACY | Facility: CLINIC | Age: 69
End: 2024-06-25
Payer: COMMERCIAL

## 2024-06-25 DIAGNOSIS — L02.419 CELLULITIS AND ABSCESS OF LEG: Primary | ICD-10-CM

## 2024-06-25 DIAGNOSIS — Z79.4 TYPE 2 DIABETES MELLITUS WITH HYPERGLYCEMIA, WITH LONG-TERM CURRENT USE OF INSULIN (H): ICD-10-CM

## 2024-06-25 DIAGNOSIS — L03.119 CELLULITIS AND ABSCESS OF LEG: Primary | ICD-10-CM

## 2024-06-25 DIAGNOSIS — E11.65 TYPE 2 DIABETES MELLITUS WITH HYPERGLYCEMIA, WITH LONG-TERM CURRENT USE OF INSULIN (H): ICD-10-CM

## 2024-06-25 DIAGNOSIS — R60.0 LEG EDEMA: ICD-10-CM

## 2024-06-25 PROCEDURE — 99207 PR NO CHARGE LOS: CPT | Mod: 93

## 2024-06-25 RX ORDER — BLOOD-GLUCOSE SENSOR
1 EACH MISCELLANEOUS
Qty: 2 EACH | Refills: 3 | Status: SHIPPED | OUTPATIENT
Start: 2024-06-25

## 2024-06-25 NOTE — PATIENT INSTRUCTIONS
"Recommendations from today's MTM visit:                                                    MTM (medication therapy management) is a service provided by a clinical pharmacist designed to help you get the most of out of your medicines.   Today we reviewed what your medicines are for, how to know if they are working, that your medicines are safe and how to make your medicine regimen as easy as possible.      MTM will renew the FreeStyle Libre3 Sensors   Take the Lantus in the morning daily instead of at night to help with adherence.   Start taking Ozempic again. Twist the pen to 19 clicks to get a 0.25 mg dose. Start this Saturday June 29th, and take once every Saturday. Your 4th dose will be on Saturday July 20th.    Please start the CEFADROXIL 2 capsules twice daily for 5 days if you haven't already.   Continue to take Furosemide 20 mg daily. Metoprolol was changed to 50 mg daily and told to hold for BP less than 100 mmHg systolic.       MT will let Dr. Radha Mcdermott know that you are requesting renewals for Lyrica and Modafanil.    Follow-up: Return in about 5 weeks (around 8/1/2024) for Follow up, using a phone visit, medication therapy management.    It was great speaking with you today.  I value your experience and would be very thankful for your time in providing feedback in our clinic survey. In the next few days, you may receive an email or text message from [employer] with a link to a survey related to your  clinical pharmacist.\"     To schedule another MTM appointment, please call the clinic directly or you may call the MTM scheduling line at [phone] or toll-free at [phone].     My Clinical Pharmacist's contact information:                                                      Please feel free to contact me with any questions or concerns you have.      Roxann Hdz, Denia  Medication Therapy Management Resident, [facility]  Pager: [phone]  Email: " [email or web address]

## 2024-06-25 NOTE — PROGRESS NOTES
Medication Therapy Management (MTM) Encounter    ASSESSMENT:                            Medication Adherence/Access: Plan in place for specialty home nurse care to assist with medications starting tomorrow 06/26/24. Did not discuss due to time, but could consider [facility] for pill packing services in the future.       Hospital Stay 06/15/24 -06/20/24 Bilateral LE Cellulitis ED 06/23/24-06/24/24 Leg Swelling:  Unable to determine if patient is following discharge orders for medications. Encouraged patient to take medications as directed at discharge and the antibiotic that was prescribed. Discussed that Dr. Radha Mcdermott would have to determine in renewals for Lyrica and Modfanil are appropriate.      Diabetes:   Patient is not meeting A1c goal of < 7%. Patient is not meeting goal of > 70% time in target with continuous glucose monitoring. Patient would benefit from restarting Ozempic. Offered to request 0.25 mg dose pens, patient declined, plan to use current supply. Patient would also benefit from changing the administration time of Lantus to the morning for improved adherence. Patient would also benefit from continuing to use Gabriel sensors to track blood sugars.     PLAN:                            MTM will renew the FreeStyle Libre3 Sensors   Take the Lantus (insulin glargine) in the morning daily instead of at night to help with adherence.   Start taking Ozempic again. Twist the pen 19 clicks to get a 0.25 mg dose or if the pen says 0.25 mg in the window. Start this Saturday June 29th, and take once every Saturday. Your 4th dose will be on Saturday July 20th.    Please start the CEFADROXIL 2 capsules twice daily for 5 days if you haven't already.   Continue to take Furosemide 20 mg daily. Metoprolol was changed to 50 mg daily and told to hold for blood pressures that were less than 100 mmHg systolic.       MTM will let Dr. Radha Mcdermott know that you are requesting renewals for Lyrica and  Modafanil.    Follow-up: Return in about 5 weeks (around 8/1/2024) for Follow up, using a phone visit, medication therapy management.      SUBJECTIVE/OBJECTIVE:                          Erickson Hernández is a 68 year old male called for a follow-up visit from 04/18/24. At the time of the visit the patient was not at home and actively shopping at [employer] and wanted to proceed with the visit.      Reason for visit: Diabetes follow-up, Hospital and ED follow-up    Allergies/ADRs: Reviewed in chart  Past Medical History: Reviewed in chart  Tobacco: He reports that he has never smoked. He has never used smokeless tobacco.  Alcohol: 4-6 beverages / week    Medication Adherence/Access:   Patient reports that he has been missing doses of Lantus because it is hard to remember at night. Says that he only takes it about 3 times per week.   Has not been taking the Ozempic and estimates he has been off for about 4 weeks.  Overall he feels overwhelmed with medications and current situation.   He was connected with  and reports that he is going to be receiving an in-home nurse service to help him medications and other daily living activities.     Patient cannot confirm if he is following the discharge orders for medications as he did not have his medications in front of him.     Hospital Stay 06/15/24 -06/20/24 Bilateral LE Cellulitis ED 06/23/24-06/24/24 Leg Swelling.   On 6/20/24, the patient was discharged from [facility] following a 5 day hospital stay for leg swelling and redness.     The left leg is still more swollen than the right. He reports wearing a compression boot on his right leg and compression socks. Says that everything is getting a little better.      Patient was discharged with the following medication changes:  Metoprolol XL was reduced to 50 mg daily and told to hold for BP <100 mmHg systolic  Hold Losartan and hydrochlorothiazide.   Continue Furosemide 20 mg daily - can't recall what he was  told to do at discharge  Start Duricef 1 g twice daily x 5 days    Continue Lyrica    Patient wants to know if he can get Lyrica and Modafanil.     Diabetes   Lantus 54 units once daily. See above for adherence.   Metformin  mg two tablets twice daily with meals - no issues reported   Ozempic 0.5 mg - Usually takes doses on Saturdays. See above for adherence  Aspirin 81mg daily  Patient does not report side effects.     Blood sugar monitoring: Freestyle Libre3   Patient Reports blood sugars have been: 170-300 mg/dL, 14% time in target   No low blood sugars   Denies symptoms of diabetes.     Eye exam is up to date  Foot exam is up to date  ----------------  Post Discharge Medication Reconciliation Status: discharge medications reconciled and changed, per note/orders.    I spent 30 minutes with this patient today. All changes were made via collaborative practice agreement with Sherwin Mcdermott DO. A copy of the visit note was provided to the patient's provider(s).    A summary of these recommendations was sent via [email or web address].    Gabino ReichD  Medication Therapy Management Resident, [facility]  Pager: [phone]  Email: [email or web address]    Preceptor cosignature: Parmjit Hernández was seen independently by Dr. Hdz. I have reviewed the assessment and plan. Minda Smith, PharmD, TOSHIA, BCACP    Telemedicine Visit Details  Type of service:  Telephone visit  Start Time:  2:00 PM  End Time:  2:30 PM     Medication Therapy Recommendations  Type 2 diabetes mellitus (H)    Current Medication: insulin glargine 100 UNIT/ML pen   Rationale: Patient forgets to take - Adherence - Adherence   Recommendation: Change Administration Time   Status: Patient Agreed - Adherence/Education          Current Medication: semaglutide (OZEMPIC) 2 MG/3ML pen   Rationale: Dose too high - Dosage too high - Safety   Recommendation: Decrease Dose - OZEMPIC (0.25 OR 0.5 MG/DOSE) SC   Status:  Accepted per CPA

## 2024-06-27 ENCOUNTER — MEDICAL CORRESPONDENCE (OUTPATIENT)
Dept: HEALTH INFORMATION MANAGEMENT | Facility: CLINIC | Age: 69
End: 2024-06-27

## 2024-06-27 ENCOUNTER — TELEPHONE (OUTPATIENT)
Dept: FAMILY MEDICINE | Facility: CLINIC | Age: 69
End: 2024-06-27
Payer: COMMERCIAL

## 2024-06-27 DIAGNOSIS — G47.33 OBSTRUCTIVE SLEEP APNEA SYNDROME: ICD-10-CM

## 2024-06-27 RX ORDER — MODAFINIL 200 MG/1
400 TABLET ORAL DAILY
Qty: 60 TABLET | Refills: 2 | Status: SHIPPED | OUTPATIENT
Start: 2024-06-27 | End: 2024-07-08

## 2024-06-27 NOTE — TELEPHONE ENCOUNTER
Mónica Floyd from [facility] home care called to request the following verbal orders from DE:    Delay start of skilled nursing, PT, OT, home ronald aid, and medical social worker to 6/27/24.    Best call back #: [phone].   Confidential seema Hernandez for detailed vm.    Thanks!  Ifeoma ALEX

## 2024-06-27 NOTE — TELEPHONE ENCOUNTER
Home Care is calling regarding an established patient with [facility].        6/27/2024     9:49 AM   Home Care Information    Name/Phone Number Mónica   [phone]   Home Care agency [facility]     Requesting orders from: Sherwin Mejia  Provider is following patient: No       Orders Requested    Skilled Nursing  Request for delay in care   Delay start to date: 6/27/24       Physical Therapy  Request for  Delay start to date: 6/27/24       Occupational Therapy  Request for  Delay start to date: 6/27/24       HHA (Home Health Aide)  Request for  Delay start to date: 6/27/24     : Delay start to date: 6/27/24     Information was gathered and will be sent to provider for review.  RN will contact Home Care with information after provider review.  Confirmed ok to leave a detailed message with call back.  Contact information confirmed and updated as needed.    Divina AGRAWAL RN

## 2024-06-28 ENCOUNTER — TELEPHONE (OUTPATIENT)
Dept: FAMILY MEDICINE | Facility: CLINIC | Age: 69
End: 2024-06-28
Payer: COMMERCIAL

## 2024-06-28 NOTE — TELEPHONE ENCOUNTER
Reason for Call:  Home Health Care    Conrado with [facility] called regarding (reason for call): verbal order    Orders are needed for this patient. OT    PT: N/A    OT: Delay Eval to Start 7/1/24    Skilled Nursing: N/A    Pt Provider: Radha    Phone Number Homecare Nurse can be reached at: [phone]    Can we leave a detailed message on this number? YES    Phone number patient can be reached at: Other phone number:  N/A*    Best Time: Today    Call taken on 6/28/2024 at 5:00 PM by Genie Laureano

## 2024-06-28 NOTE — TELEPHONE ENCOUNTER
DE,        Home Care is calling regarding an established patient with  [facility].        6/27/2024     9:49 AM   Home Care Information    Name/Phone Number Mónica   [phone]   Home Care agency Paulie [facility] from [facility] ([phone])  Requesting orders from: Sherwin Mejia  Provider is following patient: Yes  Is this a 60-day recertification request?  No    Orders Requested    Skilled Nursing  Request for initial certification (first set of orders)   Frequency: Once a week for one week, then two times a week for 5 weeks and 3 prn visits - for would care management, diabetes, Medication and disease management. Effective 6/27/24    HHA (Home Health Aide)  Request for initial certification (first set of orders)   Frequency: once a week for 4 weeks for shower assist  Effective 7/1/24    Medical social worker  One time visit to discuss financiall assistance and community resource options  Effective 7/1/24    Dietician evaluation:  Discuss diabetic planning options  Effective 7/1/24    Diagnosis for: left calf venous statis ulcer    Wound care to be completed twice week and prn per orders    FYI: Patient  currently out of ASA and Vitamin B Complex due to financial hardship.    Was able to getModafinil, waiting to see if he can Lyrica    FYI:  Possible DDI between Modafinil and Vraylar      Information was gathered and will be sent to provider for review.  RN will contact Home Care with information after provider review.  Confirmed ok to leave a detailed message with call back.  Contact information confirmed and updated as needed.    Thank you,  Emily Pena RN

## 2024-07-01 ENCOUNTER — MEDICAL CORRESPONDENCE (OUTPATIENT)
Dept: HEALTH INFORMATION MANAGEMENT | Facility: CLINIC | Age: 69
End: 2024-07-01
Payer: COMMERCIAL

## 2024-07-01 ENCOUNTER — TELEPHONE (OUTPATIENT)
Dept: FAMILY MEDICINE | Facility: CLINIC | Age: 69
End: 2024-07-01
Payer: COMMERCIAL

## 2024-07-01 ENCOUNTER — TELEPHONE (OUTPATIENT)
Dept: FAMILY MEDICINE | Facility: CLINIC | Age: 69
End: 2024-07-01

## 2024-07-01 NOTE — TELEPHONE ENCOUNTER
Forms/Letter Request    Type of form/letter: OTHER: woundcare supplies eff 6/28/2024       Do we have the form/letter: Yes: orders    Who is the form from? Home care    Where did/will the form come from? form was faxed in    When is form/letter needed by: asap    How would you like the form/letter returned: Fax : [phone]    Patient Notified form requests are processed in 5-7 business days:Yes    Could we send this information to you in [facility] or would you prefer to receive a phone call?:   No preference   Okay to leave a detailed message?: No at Other phone number:  [phone]

## 2024-07-01 NOTE — TELEPHONE ENCOUNTER
Home Care is calling regarding an established patient with  "[employer] [place].        6/27/2024     9:49 AM   Home Care Information    Name/Phone Number Mónica   [phone]   Home Care agency [facility]     Requesting orders from: Sherwin Mejia  Provider is following patient: Yes  Is this a 60-day recertification request?  No    Orders Requested    Physical Therapy  Request for continuation of care with increase in frequency  Frequency:  see above.        Verbal orders given.  Home Care will send orders for provider to sign.    Aggie Smith RN

## 2024-07-01 NOTE — TELEPHONE ENCOUNTER
Reason for Call:  Home Health Care    Francisca with [facility] called regarding (reason for call): verbal order    Orders are needed for this patient. PT    PT: Starting 7/2/24-- 1X1, 2X2, 1X2   For Neuromuscluer Exercise and Activities  GAIT Training Pain Management    OT: N/A    Skilled Nursing: N/A    Pt Provider: Radha    Phone Number Homecare Nurse can be reached at: [phone]    Can we leave a detailed message on this number? YES    Phone number patient can be reached at: Other phone number:  N/A    Best Time: Today    Call taken on 7/1/2024 at 4:19 PM by Genie Laureano

## 2024-07-02 ENCOUNTER — MEDICAL CORRESPONDENCE (OUTPATIENT)
Dept: HEALTH INFORMATION MANAGEMENT | Facility: CLINIC | Age: 69
End: 2024-07-02
Payer: COMMERCIAL

## 2024-07-03 ENCOUNTER — MEDICAL CORRESPONDENCE (OUTPATIENT)
Dept: HEALTH INFORMATION MANAGEMENT | Facility: CLINIC | Age: 69
End: 2024-07-03
Payer: COMMERCIAL

## 2024-07-03 ENCOUNTER — TRANSFERRED RECORDS (OUTPATIENT)
Dept: HEALTH INFORMATION MANAGEMENT | Facility: CLINIC | Age: 69
End: 2024-07-03
Payer: COMMERCIAL

## 2024-07-04 ENCOUNTER — PATIENT OUTREACH (OUTPATIENT)
Dept: CARE COORDINATION | Facility: CLINIC | Age: 69
End: 2024-07-04
Payer: COMMERCIAL

## 2024-07-08 DIAGNOSIS — G47.33 OBSTRUCTIVE SLEEP APNEA SYNDROME: ICD-10-CM

## 2024-07-08 RX ORDER — MODAFINIL 200 MG/1
400 TABLET ORAL DAILY
Qty: 60 TABLET | Refills: 2 | Status: SHIPPED | OUTPATIENT
Start: 2024-07-08

## 2024-07-09 ENCOUNTER — OFFICE VISIT (OUTPATIENT)
Dept: NEUROLOGY | Facility: CLINIC | Age: 69
End: 2024-07-09
Payer: COMMERCIAL

## 2024-07-09 ENCOUNTER — LAB (OUTPATIENT)
Dept: LAB | Facility: CLINIC | Age: 69
End: 2024-07-09
Payer: COMMERCIAL

## 2024-07-09 VITALS — OXYGEN SATURATION: 92 % | DIASTOLIC BLOOD PRESSURE: 85 MMHG | HEART RATE: 77 BPM | SYSTOLIC BLOOD PRESSURE: 142 MMHG

## 2024-07-09 DIAGNOSIS — E85.81 LIGHT CHAIN (AL) AMYLOIDOSIS (H): ICD-10-CM

## 2024-07-09 DIAGNOSIS — G47.33 OBSTRUCTIVE SLEEP APNEA SYNDROME: Primary | ICD-10-CM

## 2024-07-09 DIAGNOSIS — R41.3 MEMORY DIFFICULTY: ICD-10-CM

## 2024-07-09 DIAGNOSIS — R60.9 EDEMA, UNSPECIFIED: ICD-10-CM

## 2024-07-09 DIAGNOSIS — Z01.818 PRE-PROCEDURAL EXAMINATION: ICD-10-CM

## 2024-07-09 DIAGNOSIS — E11.69 TYPE 2 DIABETES MELLITUS WITH OTHER SPECIFIED COMPLICATION, WITHOUT LONG-TERM CURRENT USE OF INSULIN (H): ICD-10-CM

## 2024-07-09 DIAGNOSIS — R79.1 ABNORMAL COAGULATION PROFILE: ICD-10-CM

## 2024-07-09 LAB
ALBUMIN SERPL BCG-MCNC: 4.3 G/DL (ref 3.5–5.2)
ALP SERPL-CCNC: 179 U/L (ref 40–150)
ALT SERPL W P-5'-P-CCNC: 73 U/L (ref 0–70)
ANION GAP SERPL CALCULATED.3IONS-SCNC: 12 MMOL/L (ref 7–15)
APTT PPP: 29 SECONDS (ref 22–38)
AST SERPL W P-5'-P-CCNC: 41 U/L (ref 0–45)
BASOPHILS # BLD AUTO: 0 10E3/UL (ref 0–0.2)
BASOPHILS NFR BLD AUTO: 1 %
BILIRUB SERPL-MCNC: 0.5 MG/DL
BUN SERPL-MCNC: 24.8 MG/DL (ref 8–23)
CALCIUM SERPL-MCNC: 9.3 MG/DL (ref 8.8–10.2)
CHLORIDE SERPL-SCNC: 94 MMOL/L (ref 98–107)
CREAT SERPL-MCNC: 0.98 MG/DL (ref 0.67–1.17)
DEPRECATED HCO3 PLAS-SCNC: 27 MMOL/L (ref 22–29)
EGFRCR SERPLBLD CKD-EPI 2021: 85 ML/MIN/1.73M2
EOSINOPHIL # BLD AUTO: 0.2 10E3/UL (ref 0–0.7)
EOSINOPHIL NFR BLD AUTO: 3 %
ERYTHROCYTE [DISTWIDTH] IN BLOOD BY AUTOMATED COUNT: 12.9 % (ref 10–15)
GLUCOSE SERPL-MCNC: 308 MG/DL (ref 70–99)
HBA1C MFR BLD: 9.4 % (ref 0–5.6)
HCT VFR BLD AUTO: 39.1 % (ref 40–53)
HGB BLD-MCNC: 13.8 G/DL (ref 13.3–17.7)
IMM GRANULOCYTES # BLD: 0 10E3/UL
IMM GRANULOCYTES NFR BLD: 1 %
INR PPP: 1.04 (ref 0.85–1.15)
LYMPHOCYTES # BLD AUTO: 0.8 10E3/UL (ref 0.8–5.3)
LYMPHOCYTES NFR BLD AUTO: 12 %
MCH RBC QN AUTO: 33.4 PG (ref 26.5–33)
MCHC RBC AUTO-ENTMCNC: 35.3 G/DL (ref 31.5–36.5)
MCV RBC AUTO: 95 FL (ref 78–100)
MONOCYTES # BLD AUTO: 0.5 10E3/UL (ref 0–1.3)
MONOCYTES NFR BLD AUTO: 7 %
NEUTROPHILS # BLD AUTO: 5.6 10E3/UL (ref 1.6–8.3)
NEUTROPHILS NFR BLD AUTO: 78 %
NT-PROBNP SERPL-MCNC: 74 PG/ML (ref 0–900)
PLATELET # BLD AUTO: 159 10E3/UL (ref 150–450)
POTASSIUM SERPL-SCNC: 3.8 MMOL/L (ref 3.4–5.3)
PROT SERPL-MCNC: 6.6 G/DL (ref 6.4–8.3)
RBC # BLD AUTO: 4.13 10E6/UL (ref 4.4–5.9)
SODIUM SERPL-SCNC: 133 MMOL/L (ref 135–145)
TOTAL PROTEIN SERUM FOR ELP: 6.3 G/DL (ref 6.4–8.3)
WBC # BLD AUTO: 7.1 10E3/UL (ref 4–11)

## 2024-07-09 PROCEDURE — 84165 PROTEIN E-PHORESIS SERUM: CPT | Performed by: PATHOLOGY

## 2024-07-09 PROCEDURE — 83036 HEMOGLOBIN GLYCOSYLATED A1C: CPT

## 2024-07-09 PROCEDURE — 36415 COLL VENOUS BLD VENIPUNCTURE: CPT

## 2024-07-09 PROCEDURE — 85025 COMPLETE CBC W/AUTO DIFF WBC: CPT

## 2024-07-09 PROCEDURE — 84155 ASSAY OF PROTEIN SERUM: CPT | Mod: 59

## 2024-07-09 PROCEDURE — 83521 IG LIGHT CHAINS FREE EACH: CPT

## 2024-07-09 PROCEDURE — 85730 THROMBOPLASTIN TIME PARTIAL: CPT

## 2024-07-09 PROCEDURE — 80053 COMPREHEN METABOLIC PANEL: CPT

## 2024-07-09 PROCEDURE — 85610 PROTHROMBIN TIME: CPT

## 2024-07-09 PROCEDURE — 99214 OFFICE O/P EST MOD 30 MIN: CPT | Performed by: STUDENT IN AN ORGANIZED HEALTH CARE EDUCATION/TRAINING PROGRAM

## 2024-07-09 PROCEDURE — G2211 COMPLEX E/M VISIT ADD ON: HCPCS | Performed by: STUDENT IN AN ORGANIZED HEALTH CARE EDUCATION/TRAINING PROGRAM

## 2024-07-09 PROCEDURE — 83880 ASSAY OF NATRIURETIC PEPTIDE: CPT

## 2024-07-09 NOTE — LETTER
7/9/2024      Parmjit Hernández  [address]  [place] [address]      Dear Colleague,    Thank you for referring your patient, Parmjit Hernández, to the [facility]. Please see a copy of my visit note below.    [facility]/[place]  Section of General Neurology  Return Patient Visit    Parmjit Hernández MRN# [id number]   Age: 68 year old YOB: 1955     Brief history of symptoms: The patient was initially seen in neurologic consultation on 9/15/23 for evaluation of memory changes. Please see the comprehensive neurologic consultation note from that date in the Epic records for details.          Assessment and Plan:   Assessment:  Erickson Hernández is a pleasant 68 year old man who presents in follow up for memory changes.  He feels his memory has worsened since our last visit.  He scored similarly and still in a normal range on testing.  We will pursue other testing and work up.  I think him having a dx of NORMA and not using CPAP is the biggest variable in his case.  He notes he can't afford one.  I will refer to sleep medicine to see if he needs to be re tested/have CPAP titrated at all/if this would help matters.  Otherwise OT for cognitive exercises, neuropsychological testing will be valuable to further exclude less likely prodromal dementia.      Plan:  OT Referral  Sleep referral for NORMA/CPAP  Neuropsychological testing  To return after completion of neuropsychological testing to review this data and see how he is doing    Christopher Jose MD   of Neurology   [facility]      Interval history:     Had low back surgery about a month ago  Memory --having some lapses at times.    Still not using CPAP. Can't afford a new one.  Mood is OK   Reviewed previous work up    A/P at last visit  Erickson Hernández is a pleasant 68 year old man who presents in consultation for memory changes.  He feels his memory has improved since  he booked the visit which is great news. We reviewed his MRI which shows age appropriate changes, no disproportionate hippocampal atrophy.  He scored quite well on testing today.  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  In totality I do not see evidence of a dementia syndrome.  Improved sleep could improve memory further which we discussed (He has not been using CPAP since a move from [place] e.g. (lost in the move).       Plan:  --No evidence of a dementia syndrome as above at this time.    --Pituitary enlargement seen going back to ~2020 scan.  Should continue to monitor over time perhaps in ~ year from last scan and space out farther from there pending stability.    --Recommend yearly eye exams in this regard, neurosurgical referral with any growth  --Follow up with general neurology can be PRN for now      Past Medical History:     Patient Active Problem List   Diagnosis     Low back pain     Hyperlipidemia     Hypertension goal BP (blood pressure) < 140/90     Type 2 diabetes mellitus (H)     Migraine headache     History of diverticulitis     MENTAL HEALTH     Generalized abdominal pain     Light chain (AL) amyloidosis (H)     Insomnia due to medical condition     Obstructive sleep apnea syndrome     Restless legs syndrome (RLS)     Leg edema     Morbid obesity (H)     Venous stasis dermatitis of both lower extremities     Polyneuropathy, unspecified     Acquired lymphedema     Low blood pressure reading     History of peripheral stem cell transplant (H)     Other specified anxiety disorders     Abnormal electrocardiography     Onychomycosis     Thrombocytopenia, unspecified (H24)     Bilateral leg weakness     NSTEMI (non-ST elevated myocardial infarction) (H)     Essential hypertension     Peripheral vascular disease (H24)     Other cirrhosis of liver (H)     Lymphedema     Right upper quadrant pain     Abnormal computed tomography scan     Benign neoplasm of cecum      Benign neoplasm of transverse colon     Benign prostatic hyperplasia     Bilateral cataracts     Cannabis dependence in remission (H)     Cardiovascular stress test abnormal     Chronic fatigue, unspecified     Continuous opioid dependence (H)     Contusion of rib     Coronary arteriosclerosis     DDD (degenerative disc disease), lumbar     Decreased testosterone level     Depressed bipolar I disorder in full remission (H24)     Chronic diarrhea     Hernia of anterior abdominal wall     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)     History of anaphylaxis due to severe acute respiratory syndrome coronavirus 2 (SARS-CoV-2) vaccine     History of colonic polyps     Hypertensive renal disease     Hypocalcemia     Immunoglobulin deficiency (H24)     Localized enlarged lymph nodes     Nonalcoholic steatohepatitis     Noninfectious gastroenteritis     Other muscle spasm     Polyp of colon     Presence of implanted infusion pump     Retention of urine     Spinal stenosis at L4-L5 level     Chronic pain     Abdominal pain, generalized     Nausea and vomiting, unspecified vomiting type     Past Medical History:   Diagnosis Date     Acquired lymphedema 02/04/2019     Allergic state      Amyloidosis (H)     followed by hematology Dr. Romeo status post peripheral stem cell transplant     Anxiety 05/11/2016     Anxiety and depression 12/18/2013     Bipolar I disorder (H) 09/01/2015    Under care of psychiatrist NEISHA- Dr Rahman doxepin 25 mg, 2 cap bedtime DULoxetine 20 mg once daily  OLANZapine 7.5 mg  1 tab bedtime      Cerebral infarction, unspecified (H) 07/13/2015     DDD (degenerative disc disease), lumbar 08/06/2020     Depressive disorder 1995     EKG abnormality 04/20/2020     H/O bone marrow transplant (H)      Headache(784.0)      History of diverticulitis 01/27/2015    1/15-rxed with antibiotics      Hyperlipidemia LDL goal <100 10/31/2010     Hypertension 2007     Hypertension goal BP (blood  pressure) < 140/90 10/11/2011     Marianne (H) 08/20/2015     Morbid obesity (H) 08/28/2018     Myalgia and myositis, unspecified      Narcotic dependence (H) 09/06/2016    None in about 6 months- 8/1/17     NSTEMI (non-ST elevated myocardial infarction) (H) 04/19/2020     OCD (obsessive compulsive disorder)      Other acquired absence of organ 1994     Other cirrhosis of liver (H) possibly from vences  04/15/2020     Other specified viral warts      Peripheral edema 05/20/2018    Noncardiac Continue with  furosemide (LASIX) 20 MG tablet,  potassium       chloride SA (K-DUR/KLOR-CON M) 10 MEQ CR  Tab once daily  Basic metabolic panel     Polyneuropathy associated with underlying disease (H24) 02/04/2019     Restless legs syndrome (RLS) 06/29/2017     Stasis dermatitis of both legs 12/31/2018     Type 2 diabetes mellitus with other specified complication (H) 07/10/2017        Past Surgical History:     Past Surgical History:   Procedure Laterality Date     ABDOMEN SURGERY  2007    abdominal hernia     BACK SURGERY  8/6/2020     BIOPSY  june 2015    negative     BMT PROTOCOL      for systemic amyloidosis     BONE MARROW BIOPSY, BONE SPECIMEN, NEEDLE/TROCAR N/A 6/8/2016    Procedure: BIOPSY BONE MARROW;  Surgeon: Nathan Agrawal MD;  Location:  GI     BONE MARROW BIOPSY, BONE SPECIMEN, NEEDLE/TROCAR N/A 2/20/2019    Procedure: BIOPSY BONE MARROW;  Surgeon: Michael Raygoza MD;  Location:  GI     CHOLECYSTECTOMY  1995    lap qian     COLONOSCOPY  2012    hx polyps     ESOPHAGOSCOPY, GASTROSCOPY, DUODENOSCOPY (EGD), COMBINED N/A 5/29/2015    Procedure: COMBINED ESOPHAGOSCOPY, GASTROSCOPY, DUODENOSCOPY (EGD), BIOPSY SINGLE OR MULTIPLE;  Surgeon: John Jacob MD;  Location:  GI     HERNIA REPAIR, UMBILICAL  2006     Z NONSPECIFIC PROCEDURE  94    Cholecystectomy     [facility] NONSPECIFIC PROCEDURE  2000    repair deviated septum        Social History:     Social History     Tobacco Use     Smoking  status: Never     Smokeless tobacco: Never   Vaping Use     Vaping status: Never Used   Substance Use Topics     Alcohol use: Not Currently     Alcohol/week: 0.0 standard drinks of alcohol     Drug use: Not Currently     Types: Cocaine, Marijuana, Methamphetamines, Opiates, IV, Amphetamines, Barbiturates, Benzodiazepines, Codeine, Fentanyl, Hashish, Hydrocodone, Hydromorphone, LSD, Oxycodone        Family History:     Family History   Problem Relation Age of Onset     Cerebrovascular Disease Mother      C.A.D. Mother      Hypertension Mother      Alcohol/Drug Mother      Arthritis Mother      Cerebrovascular Disease Maternal Grandmother      C.A.D. Maternal Grandmother      Alcohol/Drug Maternal Grandmother      C.A.D. Father      Heart Disease Father      Hypertension Father      Alcohol/Drug Father      Allergies Father      Circulatory Father      Depression Father      Respiratory Father      Asthma Father      Alcohol/Drug Paternal Grandfather      Cerebrovascular Disease Paternal Grandfather      Depression Son      Psychotic Disorder Son         anxiety disorder     Depression Daughter      Cerebrovascular Disease Maternal Grandfather      Cerebrovascular Disease Paternal Grandmother      Diabetes Brother      Allergies Sister      Depression Sister      Gynecology Sister         Medications:     Current Outpatient Medications   Medication Sig Dispense Refill     acetaminophen (TYLENOL) 500 MG tablet Take 1,000 mg by mouth every 8 hours as needed       albuterol (PROAIR HFA/PROVENTIL HFA/VENTOLIN HFA) 108 (90 Base) MCG/ACT inhaler Inhale 2 puffs into the lungs every 6 hours as needed for shortness of breath, wheezing or cough 18 g 0     aspirin (ASPIRIN LOW DOSE) 81 MG tablet Take 1 tablet (81 mg) by mouth daily 30 tablet 3     atorvastatin (LIPITOR) 20 MG tablet Take 1.5 tablets (30 mg) by mouth daily 135 tablet 3     Bioflavonoid Products (CANDIDO-C) TABS Take 1,000 mg by mouth daily 90 tablet 3     bisacodyl  (DULCOLAX) 5 MG EC tablet Two days prior to exam take two (2) tablets at 4pm. One day prior to exam take two (2) tablets at 4pm 4 tablet 0     buPROPion (WELLBUTRIN XL) 150 MG 24 hr tablet Take 1 tablet (150 mg) by mouth every morning 90 tablet 3     calcium carbonate (OS-LUIS) 1500 (600 Ca) MG tablet Take 600 mg by mouth daily       cariprazine (VRAYLAR) 4.5 MG capsule Take 1 capsule (4.5 mg) by mouth daily 90 capsule 3     Continuous Glucose  (FREESTYLE VERA 3 READER) MARAL 1 each every 14 days To test blood sugar 1 each 0     Continuous Glucose Sensor (FREESTYLE VERA 3 SENSOR) MISC 1 each every 14 days Apply 1 sensor every 14 days 2 each 3     DULoxetine (CYMBALTA) 30 MG capsule Take 1 capsule (30 mg) by mouth 2 times daily 180 capsule 3     Folic Acid-Vit B6-Vit B12 0.5-5-0.2 MG TABS Take 1 tablet by mouth daily 90 tablet 3     furosemide (LASIX) 20 MG tablet Take 2 tablets (40 mg) daily x 7 days. Then take 1  tablet (20 mg) daily 90 tablet 0     glucose 40 % (400 mg/mL) gel Take by mouth every 15 minutes as needed for low blood sugar       hydrochlorothiazide (HYDRODIURIL) 25 MG tablet Take 1.5 tablets (37.5 mg) by mouth daily 135 tablet 3     insulin glargine 100 UNIT/ML pen Inject 54 Units Subcutaneous at bedtime (Max 60 units) 75 mL 3     insulin pen needle (31G X 8 MM) 31G X 8 MM miscellaneous Use one pen needles daily or as directed. 100 each 1     loperamide (IMODIUM) 2 MG capsule Take 1 capsule (2 mg) by mouth 4 times daily as needed for diarrhea       losartan (COZAAR) 100 MG tablet Take 1 tablet (100 mg) by mouth daily 90 tablet 3     medication given by implanted intrathecal pump continuous Drug # 1: Fentanyl (Sublimaze) - Conc: 2000 mcg/mL - Total Dose / 24 hours: 1663.3 mcg  Drug # 2: Bupivacaine (Marcaine)  - Conc: 20 mg/mL - Total Dose / 24 hours: 16.633 mg  Drug # 3: Morphine (Duramorph or Infumorph)  - Conc: 9.6 mg/mL - Total Dose / 24 hours: 7.9836 mg    Pump Reservoir Volume: 40  mL  Pump Reservoir Alarm Volume: 2 ml  Outside Clinic & Provider: Dr. Pawan Shaw [facility]  Last Refill Date: 12/21/23  Next Refill Date: 1/28/24  Low [place] Alarm Date:  Pump : Medtronic SynchroMed II    Boluses: Up to 3 per day, 4 minute duration. Lock-out every 6 hours (also has dose restriction interval noted 1 per 8 hours)   Fentanyl: 110.1 mcg/dose  Bupivacaine: 1.101 mg/dose  Morphine: 0.5287 mg/dose       metFORMIN (GLUCOPHAGE XR) 500 MG 24 hr tablet Take 2 tablets (1,000 mg) by mouth 2 times daily (with meals) 360 tablet 3     metoprolol succinate ER (TOPROL XL) 100 MG 24 hr tablet Take 1 tablet (100 mg) by mouth daily 90 tablet 3     modafinil (PROVIGIL) 200 MG tablet Take 2 tablets (400 mg) by mouth daily Pt has increased his dose to 400mg daily from 300mg daily.  Has a message in to his physician for a dose increase. 60 tablet 2     mometasone (ELOCON) 0.1 % external cream Apply topically daily as needed For psoriasis on face       multivitamin w/minerals (THERA-VIT-M) tablet Take 1 tablet by mouth daily Men's 50+       nitroGLYcerin (NITROSTAT) 0.4 MG sublingual tablet Place 1 tablet (0.4 mg) under the tongue every 5 minutes as needed for chest pain For chest pain place 1 tablet under the tongue every 5 minutes for 3 doses. If symptoms persist 5 minutes after 1st dose call 911. 30 tablet 1     ofloxacin (OCUFLOX) 0.3 % ophthalmic solution PLACE 1-2 DROPS INTO THE LEFT EYE 4 TIMES DAILY (FOR 5-7 DAYS, THEN STOP) 5 mL 0     polyethylene glycol (GOLYTELY) 236 g suspension Take as directed. Two days before your exam fill the first container with water. Cover and shake until mixed well. At 5:00pm drink one 8oz glass every 10-15 minutes until half (1/2) of the first container is empty. Store the remainder in the refrigerator. One day before your exam at 5:00pm drink the second half of the first container until it is gone. Before you go to bed mix the second container with water and  put in refrigerator. Six hours before your check in time drink one 8oz glass every 10-15 minutes until half of container is empty. Discard the remainder of solution. 8000 mL 0     POTASSIUM PO Take 1 tablet by mouth daily Unspecified tablet strength; patient estimated 600 mg, takes for leg cramps       pregabalin (LYRICA) 100 MG capsule Take 1 capsule (100 mg) by mouth at bedtime       semaglutide (OZEMPIC) 2 MG/3ML pen Inject 0.5 mg Subcutaneous every 7 days 3 mL 2     senna-docusate (SENOKOT-S/PERICOLACE) 8.6-50 MG tablet Take 1-2 tablets by mouth 2 times daily as needed       SKYRIZI  MG/ML subcutaneous Inject 150 mg Subcutaneous once       SUMAtriptan (IMITREX) 50 MG tablet Take 1 tablet (50 mg) by mouth at onset of headache for migraine May repeat in 2 hours. Max 4 tablets/24 hours. 8 tablet 1     tamsulosin (FLOMAX) 0.4 MG capsule Take 1 capsule (0.4 mg) by mouth 2 times daily 180 capsule 3     testosterone (ANDROGEL/TESTIM) 50 MG/5GM (1%) topical gel Place 1 packet (50 mg of testosterone) onto the skin daily 30 packet 1     vitamin B-Complex Take 3 tablets by mouth daily       vitamin D3 (CHOLECALCIFEROL) 50 mcg (2000 units) tablet Take 1 tablet (50 mcg) by mouth daily 90 tablet 3     No current facility-administered medications for this visit.        Allergies:     Allergies   Allergen Reactions     Amoxicillin Diarrhea     Cephalexin Diarrhea     Liraglutide Other (See Comments), Headache and Unknown     Other reaction(s): Headache, Unknown  PN: migraines - Increased migraine frequency and severity       Sulfa Antibiotics Angioedema and Swelling     Pt has taken  Taken sulfa drugs orally without trouble. He had problems with Sulfa eye drops. Eye swelled up            Physical Exam:   Vitals: BP (!) 142/85   Pulse 77   SpO2 92%    General Appearance: No apparent distress, well-nourished, well-groomed, pleasant      Mental Status: Alert and oriented to person, place, and time. Speech fluent and  comprehension intact. No dysarthria. 28/30 to testing 3/5 delayed recall 4/5 with cues     Cranial Nerves:   II: Visual fields: normal  III: Pupils: 3 mm, equal, round, reactive to light   III,IV,VI: Extraocular Movements: intact   V: Facial sensation: intact to light touch  VII: Facial strength: intact without asymmetry     Motor Exam:   5/5 diffusely where tested     Reflexes: biceps, triceps, brachioradialis, patellar 1+and ankle jerks trace and symmetric.             Data: Pertinent prior to visit   EXAM: MR BRAIN W/O and W CONTRAST  LOCATION: [facility]  DATE/TIME: 3/27/2023 1:09 PM     INDICATION:  Memory difficulty, Abnormal MRI of head  COMPARISON: 11/16/2020.  CONTRAST: 10mL gadavist  TECHNIQUE: MRI brain without and with contrast special attention to the sella region.     FINDINGS: On the diffusion-weighted images there is no evidence of acute ischemia or restricted diffusion. There is no discrete mass lesion or midline shift. There is no acute extra-axial fluid collection or acute intraparenchymal hemorrhage. There are   appropriate flow voids within the cavernous portions of the internal carotid arteries and the basilar artery. On the FLAIR and T2-weighted images there are a few tiny foci of high signal within the periventricular and subcortical white matter consistent   with minimal small vessel ischemic disease. The ventricular system, basal cisterns and sulci are consistent with diffuse volume loss.     On the dedicated images through the sella region again visualized is the mildly enlarged solid enhancing pituitary gland. This measures approximately 1.4 cm anteriorly posteriorly by 1.0 cm craniocaudally which is stable in the interval. There is normal   enhancement and thickness of the pituitary stalk. There is no evidence of compression upon the optic chiasm.     There is no evidence of cerebellar tonsillar ectopia. Corpus callosum is within normal limits for age. The orbit  regions are unremarkable. There is no significant paranasal sinus disease. The mastoid air cells and the middle ear regions are clear.                                                                      IMPRESSION:  1. Stable mild enlargement pituitary gland with homogeneous enhancement.  2. No new mass lesion, hemorrhage or focal area suggestive of acute ischemia.  3. Mild age-related changes.          I personally reviewed the above images and reports.  The imaging represents to me unrevealing MRI brain with known pitutiary enlargement that should be monitored over time              The total time of this encounter today amounted to 30 minutes. This time included time spent with the patient, prep work, ordering tests, and performing post visit documentation.    The longitudinal plan of care for memory changes was addressed during this visit. Due to the added complexity in care, I will continue to support Mr. Hernández in the subsequent management of this condition(s) and with the ongoing continuity of care of this condition(s).      Again, thank you for allowing me to participate in the care of your patient.        Sincerely,        Dylan Jose MD

## 2024-07-09 NOTE — PROGRESS NOTES
[facility]  Section of General Neurology  Return Patient Visit    Parmjit Hernández MRN# [id number]   Age: 68 year old YOB: 1955     Brief history of symptoms: The patient was initially seen in neurologic consultation on 9/15/23 for evaluation of memory changes. Please see the comprehensive neurologic consultation note from that date in the Epic records for details.          Assessment and Plan:   Assessment:  Erickson Hernández is a pleasant 68 year old man who presents in follow up for memory changes.  He feels his memory has worsened since our last visit.  He scored similarly and still in a normal range on testing.  We will pursue other testing and work up.  I think him having a dx of NORMA and not using CPAP is the biggest variable in his case.  He notes he can't afford one.  I will refer to sleep medicine to see if he needs to be re tested/have CPAP titrated at all/if this would help matters.  Otherwise OT for cognitive exercises, neuropsychological testing will be valuable to further exclude less likely prodromal dementia.      Plan:  OT Referral  Sleep referral for NORMA/CPAP  Neuropsychological testing  To return after completion of neuropsychological testing to review this data and see how he is doing    Christopher Jose MD   of Neurology   [facility]      Interval history:     Had low back surgery about a month ago  Memory --having some lapses at times.    Still not using CPAP. Can't afford a new one.  Mood is OK   Reviewed previous work up    A/P at last visit  Erickson Hernández is a pleasant 68 year old man who presents in consultation for memory changes.  He feels his memory has improved since he booked the visit which is great news. We reviewed his MRI which shows age appropriate changes, no disproportionate hippocampal atrophy.  He scored quite well on testing today.  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  In totality I do not see evidence of a dementia syndrome.  Improved sleep could improve memory further which we discussed (He has not been using CPAP since a move from [place] e.g. (lost in the move).       Plan:  --No evidence of a dementia syndrome as above at this time.    --Pituitary enlargement seen going back to ~2020 scan.  Should continue to monitor over time perhaps in ~ year from last scan and space out farther from there pending stability.    --Recommend yearly eye exams in this regard, neurosurgical referral with any growth  --Follow up with general neurology can be PRN for now      Past Medical History:     Patient Active Problem List   Diagnosis    Low back pain    Hyperlipidemia    Hypertension goal BP (blood pressure) < 140/90    Type 2 diabetes mellitus (H)    Migraine headache    History of diverticulitis    MENTAL HEALTH    Generalized abdominal pain    Light chain (AL) amyloidosis (H)    Insomnia due to medical condition    Obstructive sleep apnea syndrome    Restless legs syndrome (RLS)    Leg edema    Morbid obesity (H)    Venous stasis dermatitis of both lower extremities    Polyneuropathy, unspecified    Acquired lymphedema    Low blood pressure reading    History of peripheral stem cell transplant (H)    Other specified anxiety disorders    Abnormal electrocardiography    Onychomycosis    Thrombocytopenia, unspecified (H24)    Bilateral leg weakness    NSTEMI (non-ST elevated myocardial infarction) (H)    Essential hypertension    Peripheral vascular disease (H24)    Other cirrhosis of liver (H)    Lymphedema    Right upper quadrant pain    Abnormal computed tomography scan    Benign neoplasm of cecum    Benign neoplasm of transverse colon    Benign prostatic hyperplasia    Bilateral cataracts    Cannabis dependence in remission (H)    Cardiovascular stress test abnormal    Chronic fatigue, unspecified    Continuous opioid dependence (H)    Contusion of rib    Coronary  arteriosclerosis    DDD (degenerative disc disease), lumbar    Decreased testosterone level    Depressed bipolar I disorder in full remission (H24)    Chronic diarrhea    Hernia of anterior abdominal wall    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)    History of anaphylaxis due to severe acute respiratory syndrome coronavirus 2 (SARS-CoV-2) vaccine    History of colonic polyps    Hypertensive renal disease    Hypocalcemia    Immunoglobulin deficiency (H24)    Localized enlarged lymph nodes    Nonalcoholic steatohepatitis    Noninfectious gastroenteritis    Other muscle spasm    Polyp of colon    Presence of implanted infusion pump    Retention of urine    Spinal stenosis at L4-L5 level    Chronic pain    Abdominal pain, generalized    Nausea and vomiting, unspecified vomiting type     Past Medical History:   Diagnosis Date    Acquired lymphedema 02/04/2019    Allergic state     Amyloidosis (H)     followed by hematology Dr. Romeo status post peripheral stem cell transplant    Anxiety 05/11/2016    Anxiety and depression 12/18/2013    Bipolar I disorder (H) 09/01/2015    Under care of psychiatrist [facility]- Dr Rahman doxepin 25 mg, 2 cap bedtime DULoxetine 20 mg once daily  OLANZapine 7.5 mg  1 tab bedtime     Cerebral infarction, unspecified (H) 07/13/2015    DDD (degenerative disc disease), lumbar 08/06/2020    Depressive disorder 1995    EKG abnormality 04/20/2020    H/O bone marrow transplant (H)     Headache(784.0)     History of diverticulitis 01/27/2015    1/15-rxed with antibiotics     Hyperlipidemia LDL goal <100 10/31/2010    Hypertension 2007    Hypertension goal BP (blood pressure) < 140/90 10/11/2011    Marianne (H) 08/20/2015    Morbid obesity (H) 08/28/2018    Myalgia and myositis, unspecified     Narcotic dependence (H) 09/06/2016    None in about 6 months- 8/1/17    NSTEMI (non-ST elevated myocardial infarction) (H) 04/19/2020    OCD (obsessive compulsive disorder)     Other  acquired absence of organ 1994    Other cirrhosis of liver (H) possibly from vences  04/15/2020    Other specified viral warts     Peripheral edema 05/20/2018    Noncardiac Continue with  furosemide (LASIX) 20 MG tablet,  potassium       chloride SA (K-DUR/KLOR-CON M) 10 MEQ CR  Tab once daily  Basic metabolic panel    Polyneuropathy associated with underlying disease (H24) 02/04/2019    Restless legs syndrome (RLS) 06/29/2017    Stasis dermatitis of both legs 12/31/2018    Type 2 diabetes mellitus with other specified complication (H) 07/10/2017        Past Surgical History:     Past Surgical History:   Procedure Laterality Date    ABDOMEN SURGERY  2007    abdominal hernia    BACK SURGERY  8/6/2020    BIOPSY  june 2015    negative    BMT PROTOCOL      for systemic amyloidosis    BONE MARROW BIOPSY, BONE SPECIMEN, NEEDLE/TROCAR N/A 6/8/2016    Procedure: BIOPSY BONE MARROW;  Surgeon: Nathan Agrawal MD;  Location:  GI    BONE MARROW BIOPSY, BONE SPECIMEN, NEEDLE/TROCAR N/A 2/20/2019    Procedure: BIOPSY BONE MARROW;  Surgeon: Michael Raygoza MD;  Location:  GI    CHOLECYSTECTOMY  1995    lap qian    COLONOSCOPY  2012    hx polyps    ESOPHAGOSCOPY, GASTROSCOPY, DUODENOSCOPY (EGD), COMBINED N/A 5/29/2015    Procedure: COMBINED ESOPHAGOSCOPY, GASTROSCOPY, DUODENOSCOPY (EGD), BIOPSY SINGLE OR MULTIPLE;  Surgeon: John Jacob MD;  Location:  GI    HERNIA REPAIR, UMBILICAL  2006    [facility] NONSPECIFIC PROCEDURE  94    Cholecystectomy    [facility] NONSPECIFIC PROCEDURE  2000    repair deviated septum        Social History:     Social History     Tobacco Use    Smoking status: Never    Smokeless tobacco: Never   Vaping Use    Vaping status: Never Used   Substance Use Topics    Alcohol use: Not Currently     Alcohol/week: 0.0 standard drinks of alcohol    Drug use: Not Currently     Types: Cocaine, Marijuana, Methamphetamines, Opiates, IV, Amphetamines, Barbiturates, Benzodiazepines, Codeine,  Fentanyl, Hashish, Hydrocodone, Hydromorphone, LSD, Oxycodone        Family History:     Family History   Problem Relation Age of Onset    Cerebrovascular Disease Mother     C.A.D. Mother     Hypertension Mother     Alcohol/Drug Mother     Arthritis Mother     Cerebrovascular Disease Maternal Grandmother     C.A.D. Maternal Grandmother     Alcohol/Drug Maternal Grandmother     C.A.D. Father     Heart Disease Father     Hypertension Father     Alcohol/Drug Father     Allergies Father     Circulatory Father     Depression Father     Respiratory Father     Asthma Father     Alcohol/Drug Paternal Grandfather     Cerebrovascular Disease Paternal Grandfather     Depression Son     Psychotic Disorder Son         anxiety disorder    Depression Daughter     Cerebrovascular Disease Maternal Grandfather     Cerebrovascular Disease Paternal Grandmother     Diabetes Brother     Allergies Sister     Depression Sister     Gynecology Sister         Medications:     Current Outpatient Medications   Medication Sig Dispense Refill    acetaminophen (TYLENOL) 500 MG tablet Take 1,000 mg by mouth every 8 hours as needed      albuterol (PROAIR HFA/PROVENTIL HFA/VENTOLIN HFA) 108 (90 Base) MCG/ACT inhaler Inhale 2 puffs into the lungs every 6 hours as needed for shortness of breath, wheezing or cough 18 g 0    aspirin (ASPIRIN LOW DOSE) 81 MG tablet Take 1 tablet (81 mg) by mouth daily 30 tablet 3    atorvastatin (LIPITOR) 20 MG tablet Take 1.5 tablets (30 mg) by mouth daily 135 tablet 3    Bioflavonoid Products (CANDIDO-C) TABS Take 1,000 mg by mouth daily 90 tablet 3    bisacodyl (DULCOLAX) 5 MG EC tablet Two days prior to exam take two (2) tablets at 4pm. One day prior to exam take two (2) tablets at 4pm 4 tablet 0    buPROPion (WELLBUTRIN XL) 150 MG 24 hr tablet Take 1 tablet (150 mg) by mouth every morning 90 tablet 3    calcium carbonate (OS-LUIS) 1500 (600 Ca) MG tablet Take 600 mg by mouth daily      cariprazine (VRAYLAR) 4.5 MG  capsule Take 1 capsule (4.5 mg) by mouth daily 90 capsule 3    Continuous Glucose  (FREESTYLE VERA 3 READER) MARAL 1 each every 14 days To test blood sugar 1 each 0    Continuous Glucose Sensor (FREESTYLE VERA 3 SENSOR) MISC 1 each every 14 days Apply 1 sensor every 14 days 2 each 3    DULoxetine (CYMBALTA) 30 MG capsule Take 1 capsule (30 mg) by mouth 2 times daily 180 capsule 3    Folic Acid-Vit B6-Vit B12 0.5-5-0.2 MG TABS Take 1 tablet by mouth daily 90 tablet 3    furosemide (LASIX) 20 MG tablet Take 2 tablets (40 mg) daily x 7 days. Then take 1  tablet (20 mg) daily 90 tablet 0    glucose 40 % (400 mg/mL) gel Take by mouth every 15 minutes as needed for low blood sugar      hydrochlorothiazide (HYDRODIURIL) 25 MG tablet Take 1.5 tablets (37.5 mg) by mouth daily 135 tablet 3    insulin glargine 100 UNIT/ML pen Inject 54 Units Subcutaneous at bedtime (Max 60 units) 75 mL 3    insulin pen needle (31G X 8 MM) 31G X 8 MM miscellaneous Use one pen needles daily or as directed. 100 each 1    loperamide (IMODIUM) 2 MG capsule Take 1 capsule (2 mg) by mouth 4 times daily as needed for diarrhea      losartan (COZAAR) 100 MG tablet Take 1 tablet (100 mg) by mouth daily 90 tablet 3    medication given by implanted intrathecal pump continuous Drug # 1: Fentanyl (Sublimaze) - Conc: 2000 mcg/mL - Total Dose / 24 hours: 1663.3 mcg  Drug # 2: Bupivacaine (Marcaine)  - Conc: 20 mg/mL - Total Dose / 24 hours: 16.633 mg  Drug # 3: Morphine (Duramorph or Infumorph)  - Conc: 9.6 mg/mL - Total Dose / 24 hours: 7.9836 mg    Pump Reservoir Volume: 40 mL  Pump Reservoir Alarm Volume: 2 ml  Outside Clinic & Provider: Dr. Pawan Shaw [facility]  Last Refill Date: 12/21/23  Next Refill Date: 1/28/24  Low [place] Alarm Date:  Pump : Medtronic SynchroMed II    Boluses: Up to 3 per day, 4 minute duration. Lock-out every 6 hours (also has dose restriction interval noted 1 per 8 hours)   Fentanyl: 110.1  mcg/dose  Bupivacaine: 1.101 mg/dose  Morphine: 0.5287 mg/dose      metFORMIN (GLUCOPHAGE XR) 500 MG 24 hr tablet Take 2 tablets (1,000 mg) by mouth 2 times daily (with meals) 360 tablet 3    metoprolol succinate ER (TOPROL XL) 100 MG 24 hr tablet Take 1 tablet (100 mg) by mouth daily 90 tablet 3    modafinil (PROVIGIL) 200 MG tablet Take 2 tablets (400 mg) by mouth daily Pt has increased his dose to 400mg daily from 300mg daily.  Has a message in to his physician for a dose increase. 60 tablet 2    mometasone (ELOCON) 0.1 % external cream Apply topically daily as needed For psoriasis on face      multivitamin w/minerals (THERA-VIT-M) tablet Take 1 tablet by mouth daily Men's 50+      nitroGLYcerin (NITROSTAT) 0.4 MG sublingual tablet Place 1 tablet (0.4 mg) under the tongue every 5 minutes as needed for chest pain For chest pain place 1 tablet under the tongue every 5 minutes for 3 doses. If symptoms persist 5 minutes after 1st dose call 911. 30 tablet 1    ofloxacin (OCUFLOX) 0.3 % ophthalmic solution PLACE 1-2 DROPS INTO THE LEFT EYE 4 TIMES DAILY (FOR 5-7 DAYS, THEN STOP) 5 mL 0    polyethylene glycol (GOLYTELY) 236 g suspension Take as directed. Two days before your exam fill the first container with water. Cover and shake until mixed well. At 5:00pm drink one 8oz glass every 10-15 minutes until half (1/2) of the first container is empty. Store the remainder in the refrigerator. One day before your exam at 5:00pm drink the second half of the first container until it is gone. Before you go to bed mix the second container with water and put in refrigerator. Six hours before your check in time drink one 8oz glass every 10-15 minutes until half of container is empty. Discard the remainder of solution. 8000 mL 0    POTASSIUM PO Take 1 tablet by mouth daily Unspecified tablet strength; patient estimated 600 mg, takes for leg cramps      pregabalin (LYRICA) 100 MG capsule Take 1 capsule (100 mg) by mouth at bedtime       semaglutide (OZEMPIC) 2 MG/3ML pen Inject 0.5 mg Subcutaneous every 7 days 3 mL 2    senna-docusate (SENOKOT-S/PERICOLACE) 8.6-50 MG tablet Take 1-2 tablets by mouth 2 times daily as needed      SKYRIZI  MG/ML subcutaneous Inject 150 mg Subcutaneous once      SUMAtriptan (IMITREX) 50 MG tablet Take 1 tablet (50 mg) by mouth at onset of headache for migraine May repeat in 2 hours. Max 4 tablets/24 hours. 8 tablet 1    tamsulosin (FLOMAX) 0.4 MG capsule Take 1 capsule (0.4 mg) by mouth 2 times daily 180 capsule 3    testosterone (ANDROGEL/TESTIM) 50 MG/5GM (1%) topical gel Place 1 packet (50 mg of testosterone) onto the skin daily 30 packet 1    vitamin B-Complex Take 3 tablets by mouth daily      vitamin D3 (CHOLECALCIFEROL) 50 mcg (2000 units) tablet Take 1 tablet (50 mcg) by mouth daily 90 tablet 3     No current facility-administered medications for this visit.        Allergies:     Allergies   Allergen Reactions    Amoxicillin Diarrhea    Cephalexin Diarrhea    Liraglutide Other (See Comments), Headache and Unknown     Other reaction(s): Headache, Unknown  PN: migraines - Increased migraine frequency and severity      Sulfa Antibiotics Angioedema and Swelling     Pt has taken  Taken sulfa drugs orally without trouble. He had problems with Sulfa eye drops. Eye swelled up            Physical Exam:   Vitals: BP (!) 142/85   Pulse 77   SpO2 92%    General Appearance: No apparent distress, well-nourished, well-groomed, pleasant      Mental Status: Alert and oriented to person, place, and time. Speech fluent and comprehension intact. No dysarthria. 28/30 to testing 3/5 delayed recall 4/5 with cues     Cranial Nerves:   II: Visual fields: normal  III: Pupils: 3 mm, equal, round, reactive to light   III,IV,VI: Extraocular Movements: intact   V: Facial sensation: intact to light touch  VII: Facial strength: intact without asymmetry     Motor Exam:   5/5 diffusely where tested     Reflexes: biceps, triceps,  brachioradialis, patellar 1+and ankle jerks trace and symmetric.             Data: Pertinent prior to visit   EXAM: MR BRAIN W/O and W CONTRAST  LOCATION: [facility]  DATE/TIME: 3/27/2023 1:09 PM     INDICATION:  Memory difficulty, Abnormal MRI of head  COMPARISON: 11/16/2020.  CONTRAST: 10mL gadavist  TECHNIQUE: MRI brain without and with contrast special attention to the sella region.     FINDINGS: On the diffusion-weighted images there is no evidence of acute ischemia or restricted diffusion. There is no discrete mass lesion or midline shift. There is no acute extra-axial fluid collection or acute intraparenchymal hemorrhage. There are   appropriate flow voids within the cavernous portions of the internal carotid arteries and the basilar artery. On the FLAIR and T2-weighted images there are a few tiny foci of high signal within the periventricular and subcortical white matter consistent   with minimal small vessel ischemic disease. The ventricular system, basal cisterns and sulci are consistent with diffuse volume loss.     On the dedicated images through the sella region again visualized is the mildly enlarged solid enhancing pituitary gland. This measures approximately 1.4 cm anteriorly posteriorly by 1.0 cm craniocaudally which is stable in the interval. There is normal   enhancement and thickness of the pituitary stalk. There is no evidence of compression upon the optic chiasm.     There is no evidence of cerebellar tonsillar ectopia. Corpus callosum is within normal limits for age. The orbit regions are unremarkable. There is no significant paranasal sinus disease. The mastoid air cells and the middle ear regions are clear.                                                                      IMPRESSION:  1. Stable mild enlargement pituitary gland with homogeneous enhancement.  2. No new mass lesion, hemorrhage or focal area suggestive of acute ischemia.  3. Mild age-related  changes.          I personally reviewed the above images and reports.  The imaging represents to me unrevealing MRI brain with known pitutiary enlargement that should be monitored over time              The total time of this encounter today amounted to 30 minutes. This time included time spent with the patient, prep work, ordering tests, and performing post visit documentation.    The longitudinal plan of care for memory changes was addressed during this visit. Due to the added complexity in care, I will continue to support Mr. Hernández in the subsequent management of this condition(s) and with the ongoing continuity of care of this condition(s).

## 2024-07-09 NOTE — PATIENT INSTRUCTIONS
OT referral  Sleep referral    There are several community neuropsychologists that we might suggest, including:   Dr. Latoya Inman - [phone]   Dr. Doroteo Hernandez - [phone]   Dr. Anneliese Bailon - [phone]   Dr. Pricilla Wagner - [phone]   Dr. Kate Vargas - [phone]   Dr. Hilary Zambrano - [phone], [email or web address]   Dr. Lavon Portillo - [phone], [email or web address]   [facility] - [phone], [email or web address]   [facility], [email or web address]   [facility] - [phone], [email or web address]     See me back to review neuropsychological testing --a few weeks after

## 2024-07-09 NOTE — NURSING NOTE
"Parmjit Hernández is a 68 year old male who presents for:  Chief Complaint   Patient presents with    Memory difficulty        Initial Vitals:  BP (!) 142/85   Pulse 77   SpO2 92%  Estimated body mass index is 41.16 kg/m  as calculated from the following:    Height as of 6/23/24: 1.676 m (5' 6\").    Weight as of 6/23/24: 115.7 kg (255 lb).. There is no height or weight on file to calculate BSA. BP completed using cuff size: warren Rogers    "

## 2024-07-10 LAB
ALBUMIN SERPL ELPH-MCNC: 4 G/DL (ref 3.7–5.1)
ALPHA1 GLOB SERPL ELPH-MCNC: 0.3 G/DL (ref 0.2–0.4)
ALPHA2 GLOB SERPL ELPH-MCNC: 0.7 G/DL (ref 0.5–0.9)
B-GLOBULIN SERPL ELPH-MCNC: 0.7 G/DL (ref 0.6–1)
GAMMA GLOB SERPL ELPH-MCNC: 0.5 G/DL (ref 0.7–1.6)
KAPPA LC FREE SER-MCNC: 1.45 MG/DL (ref 0.33–1.94)
KAPPA LC FREE/LAMBDA FREE SER NEPH: 1.07 {RATIO} (ref 0.26–1.65)
LAMBDA LC FREE SERPL-MCNC: 1.35 MG/DL (ref 0.57–2.63)
M PROTEIN SERPL ELPH-MCNC: 0 G/DL
PATH REPORT.COMMENTS IMP SPEC: ABNORMAL
PROT PATTERN SERPL ELPH-IMP: ABNORMAL

## 2024-07-12 DIAGNOSIS — E78.5 HYPERLIPIDEMIA LDL GOAL <100: ICD-10-CM

## 2024-07-12 DIAGNOSIS — I10 BENIGN ESSENTIAL HYPERTENSION: ICD-10-CM

## 2024-07-12 RX ORDER — ATORVASTATIN CALCIUM 20 MG/1
30 TABLET, FILM COATED ORAL DAILY
Qty: 135 TABLET | Refills: 3 | Status: SHIPPED | OUTPATIENT
Start: 2024-07-12

## 2024-07-12 RX ORDER — LOSARTAN POTASSIUM 100 MG/1
100 TABLET ORAL DAILY
Qty: 90 TABLET | Refills: 3 | Status: SHIPPED | OUTPATIENT
Start: 2024-07-12

## 2024-07-15 ENCOUNTER — MEDICAL CORRESPONDENCE (OUTPATIENT)
Dept: HEALTH INFORMATION MANAGEMENT | Facility: CLINIC | Age: 69
End: 2024-07-15
Payer: COMMERCIAL

## 2024-07-15 ENCOUNTER — TELEPHONE (OUTPATIENT)
Dept: FAMILY MEDICINE | Facility: CLINIC | Age: 69
End: 2024-07-15
Payer: COMMERCIAL

## 2024-07-15 DIAGNOSIS — Z53.9 DIAGNOSIS NOT YET DEFINED: Primary | ICD-10-CM

## 2024-07-15 PROCEDURE — G0180 MD CERTIFICATION HHA PATIENT: HCPCS | Performed by: FAMILY MEDICINE

## 2024-07-15 NOTE — TELEPHONE ENCOUNTER
Forms/Letter Request    Type of form/letter: Home Health Certification and Plan of Care  6/27/24--8/25/24  Order # [id number]-     2. Order # [id number]- 7/13/24- Hold Home Care Services Effective 7/12/24- Client IP At [facility] With Dx Of Fall at Home        Do we have the form/letter: Yes:     [facility] the form from? [facility]    Where did/will the form come from? form was faxed in    When is form/letter needed by: asap    How would you like the form/letter returned: Fax : [phone]    Patient Notified form requests are processed in 5-7 business days:No    Could we send this information to you in [employer] or would you prefer to receive a phone call?:   No preference   Okay to leave a detailed message?: No at Other phone number:  N/A

## 2024-07-15 NOTE — TELEPHONE ENCOUNTER
Forms faxed to [employer] fax # [phone] and copy sent to stat scan.     Ifeoma PICKERING  TC

## 2024-07-16 ENCOUNTER — PATIENT OUTREACH (OUTPATIENT)
Dept: ONCOLOGY | Facility: CLINIC | Age: 69
End: 2024-07-16
Payer: COMMERCIAL

## 2024-07-16 NOTE — PROGRESS NOTES
[facility]: Cancer Care Short Note                                                                                          Patient was a no show for oncology visit with Dr. Hester today. RNCC spoke to patient over the phone who stated he was recently in the hospital due to a complication with his back surgery, and is currently admitted to a TCU. He is unsure of how long he will be there. RNCC discussed with Dr. Hester who agreed that patient can be rescheduled in a few weeks for follow-up.     Diana Bobo, BSN, RN  RN Care Coordinator   [phone]

## 2024-07-17 ENCOUNTER — PATIENT OUTREACH (OUTPATIENT)
Dept: CARE COORDINATION | Facility: CLINIC | Age: 69
End: 2024-07-17
Payer: COMMERCIAL

## 2024-07-17 NOTE — PROGRESS NOTES
Clinic Care Coordination Contact  Care Coordination Clinician Chart Review    Situation: Patient chart reviewed by Care Coordinator.       Background: Care Coordination Program started: 12/28/2023. Initial assessment completed and patient-centered care plan(s) were developed with participation from patient. Lead CC handed patient off to [facility] for continued outreaches.       Assessment: Per chart review, patient outreach completed by CC RN on 6/4.  Patient is actively working to accomplish goal(s). Patient's goal(s) appropriate and relevant at this time. Patient is due for updated Plan of Care.  Assessments will be completed annually or as needed/with change of patient status.      Care Plan: Community Resources       Problem: Lack of transportation               Problem: Unable to prepare meals               Problem: Reliable food source               Problem: Insufficient In-home support       Goal: Establish adequate home support       Start Date: 1/10/2024 Expected End Date: 7/10/2024    This Visit's Progress: 30% Recent Progress: 20%    Note:     Barriers: Mobility Issues  Strengths: Connected with Care Coordination  Patient expressed understanding of goal: Yes  Action steps to achieve this goal:  1. I will review housekeeping resource sent by [employer] (sent initially 1/10/24 and then resent via [employer] 3/28/24)  2. I will connect with this resource if I feel it will be helpful  3. I will remain in communication with Care Coordination about barriers I encounter or additional resources needed     Goal updated 4/29/24                            Care Plan: General       Problem: HP GENERAL PROBLEM       Goal: Obtain Replacement CPAP       Start Date: 1/10/2024 Expected End Date: 7/10/2024    This Visit's Progress: 20% Recent Progress: 10%    Note:     Barriers: Misplaced CPAP  Strengths: Connected with Care Coordination  Patient expressed understanding of goal: yes  Action steps to achieve this goal:  1. I will contact  [employer] ([phone]) to request a replacement machine  2. I will remain in communication with Care Coordination about additional resources needed or barriers I encounter                                   Plan/Recommendations: The patient will continue working with Care Coordination to achieve goal(s) as above. CHW will continue outreaches at minimum every 30 days and will involve Lead CC as needed or if patient is ready to move to Maintenance. Lead CC will continue to monitor CHW outreaches and patient's progress to goal(s) every 6 weeks.     Plan of Care updated and sent to patient: Yes, via [employer]    Chantel Montgomery [facility]  Phone: [phone]  E-mail: [email or web address]

## 2024-07-17 NOTE — LETTER
[facility]  Patient Centered Plan of Care  About Me:        Patient Name:  Parmjit Prescott    YOB: 1955  Age:         68 year old   Jack MRN:    [id number] Telephone Information:  Home Phone [phone]   Mobile [phone]       Address:  [address]  [facility] [address] Email address:  [email or web address]      Emergency Contact(s)    Name Relationship Lgl Grd Work Phone Home Phone Mobile Phone   1. JC PRESCOTT Daughter   [phone]   2. POOR,DON Friend   [phone]           Primary language:  English     needed? No   [facility]:  [phone] op. 1  Other communication barriers:Physical impairment    Preferred Method of Communication:  Emiliana  Current living arrangement: I live alone    Mobility Status/ Medical Equipment: Independent w/Device        Health Maintenance  Health Maintenance Reviewed: Due/Overdue   Health Maintenance Due   Topic Date Due    CONTROLLED SUBSTANCE AGREEMENT FOR CHRONIC PAIN MANAGEMENT  Never done    RSV VACCINE (Pregnancy & 60+) (1 - 1-dose 60+ series) Never done    COLORECTAL CANCER SCREENING  08/01/2021    HEPATITIS A IMMUNIZATION (2 of 2 - Risk 2-dose series) 11/16/2022    URINE DRUG SCREEN  04/17/2024    COVID-19 Vaccine (7 - 2023-24 season) 07/01/2024      (Did not discuss during TCM outreach.)      My Access Plan  Medical Emergency 911   Primary Clinic Line [facility] [phone]   24 Hour Appointment Line [phone] or  [phone]) (toll-free)   24 Hour Nurse Line [phone] (toll-free)   Preferred Urgent Care [facility], [phone]     Preferred Hospital [facility]  [phone]     Preferred Pharmacy [facility] [address], [address]     Behavioral Health Crisis Line The National Suicide Prevention Lifeline at 1-151.981.2190 or  Text/Call 988           My Care Team Members  Patient Care Team         Relationship Specialty Notifications Start End    Sherwin Mejia, DO PCP - General Family Medicine  3/1/23     zreferred ot Derm    Phone: [phone] Fax: [phone]         [address] [place] [address] [facility] [address]    Kuldeep Borrero MD MD Gastroenterology  1/11/16     Phone: [phone] Fax: [phone]         [address] [facility] [address] [facility] [address]    Sherwin Mejia,  Assigned PCP   3/11/23     Phone: [phone] Fax: [phone]         [address] [facility] [address] [facility] [address]    Lisseth Arana DPM Assigned Surgical Provider   3/11/23     Phone: [phone] Fax: [phone] [address] [facility] [address]    Dylan Jose MD MD Neurology  3/15/23     Phone: [phone] Fax: [phone]         [address] [facility] [address]    Kera Jernigan APRN CNP Nurse Practitioner Cardiovascular Disease  3/16/23     Phone: [phone] Fax: [phone] [address] [facility] [address]    Charly Hester MD Assigned Cancer Care Provider   3/25/23     Phone: [phone] Fax: [phone]         [address] [place] [address] [address] [facility] [address]    Diana Bobo, RN Specialty Care Coordinator Hematology & Oncology Admissions 3/27/23     Rodrick Ferrer MD Assigned Heart and Vascular Provider   4/8/23     Phone: [phone] Fax: [phone] [address] [facility] [address]    Dylan Jose MD Assigned Neuroscience Provider   9/23/23     Phone: [phone] Fax: [phone]         [address] [facility] [address]    Dhruv Corea MD MD Pulmonary Disease  11/6/23     Phone: [phone] Fax: [phone] [address] [facility] [address]    Minda Smith [facility] Assigned [facility]   11/11/23     Phone: [phone] Fax: [phone] [address],  [facility] [address] [facility] [address]    Minda Smith, [facility] Pharmacist Pharmacist  11/17/23     Phone: [phone] Fax: [phone] [address], [facility] [address] [facility] [address]    Marivel Walter PA-C Physician Assistant Dermatology  12/14/23     Phone: [phone] Fax: [phone]         [address] [address], [facility] [address] [facility] [address]    Chantel Montgomery LICSW Lead Care Coordinator  Admissions 12/28/23     Phone: [phone]         Alecia Hanley CHW Community Health Worker  Admissions 1/31/24     Phone: [phone]         Dhruv Corea MD Assigned Pulmonology Provider   2/23/24     Phone: [phone] Fax: [phone]         [address] [facility] [address]    Lindsey Haddad, PhD Psychologist Neuropsychology  7/10/24     Phone: [phone] Fax: [phone]         [address] [facility] [address]                My Care Plans  Self Management and Treatment Plan    Care Plan  Care Plan: Community Resources       Problem: Lack of transportation               Problem: Unable to prepare meals               Problem: Reliable food source               Problem: Insufficient In-home support       Goal: Establish adequate home support       Start Date: 1/10/2024 Expected End Date: 7/10/2024    This Visit's Progress: 30% Recent Progress: 20%    Note:     Barriers: Mobility Issues  Strengths: Connected with Care Coordination  Patient expressed understanding of goal: Yes  Action steps to achieve this goal:  1. I will review housekeeping resource sent by [facility] (sent initially 1/10/24 and then resent via [employer] 3/28/24)  2. I will connect with this resource if I feel it will be helpful  3. I will remain in communication with Care Coordination about barriers I encounter or additional resources needed     Goal updated 4/29/24                            Care Plan: General       Problem: HP GENERAL PROBLEM       Goal: Obtain Replacement CPAP       Start Date: 1/10/2024 Expected End  Date: 7/10/2024    This Visit's Progress: 20% Recent Progress: 10%    Note:     Barriers: Misplaced CPAP  Strengths: Connected with Care Coordination  Patient expressed understanding of goal: yes  Action steps to achieve this goal:  1. I will contact [employer] ([phone]) to request a replacement machine  2. I will remain in communication with Care Coordination about additional resources needed or barriers I encounter                                Action Plans on File:                       Advance Care Plans/Directives:   Advanced Care Plan/Directives on file:   No    Discussed with patient/caregiver(s):   Other (Comment) (Did not discuss during TCM outreach.)             My Medical and Care Information  Problem List   Patient Active Problem List   Diagnosis    Low back pain    Hyperlipidemia    Hypertension goal BP (blood pressure) < 140/90    Type 2 diabetes mellitus (H)    Migraine headache    History of diverticulitis    MENTAL HEALTH    Generalized abdominal pain    Light chain (AL) amyloidosis (H)    Insomnia due to medical condition    Obstructive sleep apnea syndrome    Restless legs syndrome (RLS)    Leg edema    Morbid obesity (H)    Venous stasis dermatitis of both lower extremities    Polyneuropathy, unspecified    Acquired lymphedema    Low blood pressure reading    History of peripheral stem cell transplant (H)    Other specified anxiety disorders    Abnormal electrocardiography    Onychomycosis    Thrombocytopenia, unspecified (H24)    Bilateral leg weakness    NSTEMI (non-ST elevated myocardial infarction) (H)    Essential hypertension    Peripheral vascular disease (H24)    Other cirrhosis of liver (H)    Lymphedema    Right upper quadrant pain    Abnormal computed tomography scan    Benign neoplasm of cecum    Benign neoplasm of transverse colon    Benign prostatic hyperplasia    Bilateral cataracts    Cannabis dependence in remission (H)    Cardiovascular stress test abnormal     Chronic fatigue, unspecified    Continuous opioid dependence (H)    Contusion of rib    Coronary arteriosclerosis    DDD (degenerative disc disease), lumbar    Decreased testosterone level    Depressed bipolar I disorder in full remission (H24)    Chronic diarrhea    Hernia of anterior abdominal wall    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)    History of anaphylaxis due to severe acute respiratory syndrome coronavirus 2 (SARS-CoV-2) vaccine    History of colonic polyps    Hypertensive renal disease    Hypocalcemia    Immunoglobulin deficiency (H24)    Localized enlarged lymph nodes    Nonalcoholic steatohepatitis    Noninfectious gastroenteritis    Other muscle spasm    Polyp of colon    Presence of implanted infusion pump    Retention of urine    Spinal stenosis at L4-L5 level    Chronic pain    Abdominal pain, generalized    Nausea and vomiting, unspecified vomiting type      Current Medications and Allergies:  See printed Medication Report.    Care Coordination Start Date: 12/28/2023   Frequency of Care Coordination: monthly, more frequently as needed     Form Last Updated: 07/17/2024

## 2024-07-24 ENCOUNTER — TRANSFERRED RECORDS (OUTPATIENT)
Dept: HEALTH INFORMATION MANAGEMENT | Facility: CLINIC | Age: 69
End: 2024-07-24
Payer: COMMERCIAL

## 2024-07-24 ENCOUNTER — PATIENT OUTREACH (OUTPATIENT)
Dept: CARE COORDINATION | Facility: CLINIC | Age: 69
End: 2024-07-24
Payer: COMMERCIAL

## 2024-07-24 NOTE — PROGRESS NOTES
Clinic Care Coordination Contact  Care Team Conversations    Chart Review: Per chart review, note pt hospitalized at [facility] 7/11/24 - 7/13/14 for acute on chronic lumbar pain, with discharge to [facility], [phone]. CHW called [facility] this morning to confirm that pt is still in TCU for rehab at this time.    CHW Plan: CHW will route this encounter to GAVINO Golden, for her knowledge. CHW will push outreach out one month.    Alecia Hanley  Community Health Worker  [facility]  [phone]

## 2024-07-25 ENCOUNTER — MEDICAL CORRESPONDENCE (OUTPATIENT)
Dept: HEALTH INFORMATION MANAGEMENT | Facility: CLINIC | Age: 69
End: 2024-07-25
Payer: COMMERCIAL

## 2024-07-26 ENCOUNTER — TELEPHONE (OUTPATIENT)
Dept: FAMILY MEDICINE | Facility: CLINIC | Age: 69
End: 2024-07-26
Payer: COMMERCIAL

## 2024-07-26 ENCOUNTER — MEDICAL CORRESPONDENCE (OUTPATIENT)
Dept: HEALTH INFORMATION MANAGEMENT | Facility: CLINIC | Age: 69
End: 2024-07-26
Payer: COMMERCIAL

## 2024-07-26 NOTE — TELEPHONE ENCOUNTER
Forms faxed to [email or web address] fax # [phone] and copy sent to stat scan.     Ifeoma PICKERING  TC

## 2024-07-26 NOTE — TELEPHONE ENCOUNTER
Forms/Letter Request    Type of form/letter: OTHER:  SN eff 7/21/2024 1wk1 PT eff 7/21/2024 1wk2 OT eff 7/21/2024 1wk2 eval and treat       Do we have the form/letter: Yes: order    Who is the form from? Home care    Where did/will the form come from? form was faxed in    When is form/letter needed by: asap    How would you like the form/letter returned: Fax : [phone]    Patient Notified form requests are processed in 5-7 business days:Yes    Could we send this information to you in [employer] or would you prefer to receive a phone call?:   No preference   Okay to leave a detailed message?: No at Other phone number:  [phone]

## 2024-07-30 ENCOUNTER — TELEPHONE (OUTPATIENT)
Dept: FAMILY MEDICINE | Facility: CLINIC | Age: 69
End: 2024-07-30
Payer: COMMERCIAL

## 2024-07-30 NOTE — TELEPHONE ENCOUNTER
Konrad calling for PT verbal order:    PT 1x per week for 5 weeks to address strength, balance, transfers, and gait.    Routing to team to get VO from PCP and to call him back at [phone]      Home Care is calling regarding an established patient with [facility].        7/30/2024    10:48 AM 6/27/2024     9:49 AM   Home Care Information    Name/Phone Number [facility]   [phone] M[place]   [phone]   Home [facility]     Requesting orders from: PCP  Provider is following patient: Yes  Is this a 60-day recertification request?  No    Orders Requested    Physical Therapy  Request for initial certification (first set of orders)   Frequency:  1x/wk for 5 wks      Princess Marinelli RN

## 2024-07-30 NOTE — TELEPHONE ENCOUNTER
Forms/Letter Request    Type of form/letter: OTHER: resumption plan of care eval 7/13/2024-7/25/2024 and PT plan of care eval to asses and develop PT 6/27/2024-8/25/2024       Do we have the form/letter: Yes: orders    Who is the form from? Home care    Where did/will the form come from?  Faxed in    When is form/letter needed by: asap    How would you like the form/letter returned: Fax : [phone]    Patient Notified form requests are processed in 5-7 business days:No    Could we send this information to you in [email or web address] or would you prefer to receive a phone call?:   No preference   Okay to leave a detailed message?: No at Other phone number:  [phone]

## 2024-07-30 NOTE — TELEPHONE ENCOUNTER
Home Care is calling regarding an established patient with  [facility].        7/30/2024    10:48 AM 6/27/2024     9:49 AM   Home Care Information    Name/Phone Number [facility]   [phone] [facility]   [phone]   [facility]     Requesting orders from: Sherwin Mejia  Provider is following patient: Yes  Is this a 60-day recertification request?  No    Orders Requested    Skilled Nursing  Request for initial certification (first set of orders)   Frequency:  1x/week for 1 week; 2x/week for 4 weeks - for diagnoses and disease management, diabetic monitoring, foot care education, wound care to BLE, falls prevention and education, depression monitoring, pressure injury education      Social Work  Request for initial evaluation and treatment (one time)   Patient states he may be ready to move into assisted living facility    HHA (Home Health Aide)  Request for continuation of care with no increase or decrease in frequency  Frequency:  1x/week for 1 week; 2x/week for 4 weeks         Verbal orders given for Social Work and HHA.  Information was gathered for Skilled Nursing.  Provider review needed.  RN will contact Home Care with information after provider review.  Confirmed ok to leave a detailed message with call back.  Contact information confirmed and updated as needed.    Divina AGRAWAL RN

## 2024-07-31 NOTE — TELEPHONE ENCOUNTER
Orders signed faxed and sent for scanning  Thao [facility] Unit Coordinator

## 2024-08-01 ENCOUNTER — VIRTUAL VISIT (OUTPATIENT)
Dept: PHARMACY | Facility: CLINIC | Age: 69
End: 2024-08-01
Payer: COMMERCIAL

## 2024-08-01 DIAGNOSIS — I10 HYPERTENSION GOAL BP (BLOOD PRESSURE) < 140/90: ICD-10-CM

## 2024-08-01 DIAGNOSIS — Z79.4 TYPE 2 DIABETES MELLITUS WITH HYPERGLYCEMIA, WITH LONG-TERM CURRENT USE OF INSULIN (H): ICD-10-CM

## 2024-08-01 DIAGNOSIS — G89.29 OTHER CHRONIC PAIN: Primary | ICD-10-CM

## 2024-08-01 DIAGNOSIS — E11.65 TYPE 2 DIABETES MELLITUS WITH HYPERGLYCEMIA, WITH LONG-TERM CURRENT USE OF INSULIN (H): ICD-10-CM

## 2024-08-01 PROCEDURE — 99207 PR NO CHARGE LOS: CPT | Mod: 93 | Performed by: PHARMACIST

## 2024-08-01 RX ORDER — OXYCODONE HYDROCHLORIDE 5 MG/1
5 TABLET ORAL EVERY 6 HOURS PRN
COMMUNITY
Start: 2024-07-18

## 2024-08-01 RX ORDER — OXYCODONE HYDROCHLORIDE 10 MG/1
10 TABLET ORAL EVERY 6 HOURS PRN
COMMUNITY
Start: 2024-07-13 | End: 2024-09-06

## 2024-08-01 NOTE — PROGRESS NOTES
Medication Therapy Management (MTM) Encounter    ASSESSMENT:                            Medication Adherence/Access: Will place referral for care coordination to see if we can start home care assistance for medication help.     Diabetes   Encouraged him to continue checking his blood sugars. At this time it sounds like they are in range without medications, so will focus on other issues. However, resuming meds should be considered.     Hypertension   Encouraged restarting hydrochlorothiazide as his BP is slightly elevated and he has had issues with edema.     Pain:   Encouraged continued follow-up with pain clinic and surgeon. Encouraged him to resume duloxetine and pregabalin and discussed the role of these medicines in pain management.     PLAN:                            I would strongly encourage you to restart pregabalin 100mg once daily at bedtime and duloxetine (generic Cymbalta) 30mg twice daily. Both of these medications are to help with your back pain.   I would strongly encourage you to restart hydrochlorothiazide 25mg 1.5 tablets daily (or at least one tablet daily). This will help with swelling and also help lower your blood pressure a bit.   Obviously, I think it's a good idea to restart all of your other meds as well, but understand that things are difficult right now. I have placed a care coordination referral to see if we can get a nurse to assist with setting up pill boxes or a way to help you take your medications daily.     Follow-up: I will check in with you in 1-2 weeks to see how things are going.     SUBJECTIVE/OBJECTIVE:                          Erickson Hernández is a 69 year old male seen for a follow-up visit. However, he was recently admitted/discharged from the hospital (7/11-7/13) for acute on chronic back pain.     Reason for visit: Comprehensive medication review.    Allergies/ADRs: Reviewed in chart  Past Medical History: Reviewed in chart  Tobacco: He reports that he has never smoked. He  "has never used smokeless tobacco.  Alcohol: not currently using      Medication Adherence/Access: \"I'm not really taking anything\" - reports that he has not taken any of his medications since returning from the TCU. His obstacle to taking his meds is an inability to stand at the sink and take them (due to pain). Reports that he does not feel like there are any options that he can do on his own to take his daily medications.     He is currently applying for a LTC facility as he does not feel he can continue to live at home and care for himself. At one point he had nursing coming in to wrap his leg wound, but this has now healed.  He is open to having home care help with his medications.     Diabetes   Not currently taking medications, is wearing Gabriel 3 (not accessible via the cloud). Reports that his blood sugar has been in the \"low 100's\" he reports his TIR is 98%, with 2% above for the last 2 weeks. No low blood sugars   Denies symptoms of diabetes.     Eye exam is up to date  Foot exam is up to date    Hypertension   Not taking any of his prescribed meds. Reports that initially after returning home he had significant edema, but this has sense resolved. Reports he is monitoring BP at home and it has ranged 140/80       Pain:   Oxycodone 5-10mg four times daily for post-surgical pain. Doesn't feel like this is helping   Pain pump managed by [facility] - they just made adjustments to his medication yesterday.   ----------------  I spent 15 minutes with this patient today. All changes were made via collaborative practice agreement with Sherwin Mcdermott DO. A copy of the visit note was provided to the patient's provider(s).    A summary of these recommendations was sent via [employer].    Minda Smith, Denia, TOSHIA, BCACP  MTM Pharmacist, [facility]     Telemedicine Visit Details  Type of service:  Telephone visit  Start Time:  9:01 AM  End Time:  9:16 AM     Medication Therapy " Recommendations  Hypertension goal BP (blood pressure) < 140/90    Current Medication: hydrochlorothiazide (HYDRODIURIL) 25 MG tablet   Rationale: Patient prefers not to take - Adherence - Adherence   Recommendation: Provide Education   Status: Patient Agreed - Adherence/Education

## 2024-08-01 NOTE — PATIENT INSTRUCTIONS
"Recommendations from today's MTM visit:                                                    MTM (medication therapy management) is a service provided by a clinical pharmacist designed to help you get the most of out of your medicines.   Today we reviewed what your medicines are for, how to know if they are working, that your medicines are safe and how to make your medicine regimen as easy as possible.      I would strongly encourage you to restart pregabalin 100mg once daily at bedtime and duloxetine (generic Cymbalta) 30mg twice daily. Both of these medications are to help with your back pain.   I would strongly encourage you to restart hydrochlorothiazide 25mg 1.5 tablets daily (or at least one tablet daily). This will help with swelling and also help lower your blood pressure a bit.   Obviously, I think it's a good idea to restart all of your other meds as well, but understand that things are difficult right now. I have placed a care coordination referral to see if we can get a nurse to assist with setting up pill boxes or a way to help you take your medications daily.     Follow-up: I will check in with you in 1-2 weeks to see how things are going.     It was great speaking with you today.  I value your experience and would be very thankful for your time in providing feedback in our clinic survey. In the next few days, you may receive an email or text message from [employer] with a link to a survey related to your  clinical pharmacist.\"     To schedule another MTM appointment, please call the clinic directly or you may call the MTM scheduling line at [phone] or toll-free at [phone].     My Clinical Pharmacist's contact information:                                                      Please feel free to contact me with any questions or concerns you have.      Minda Smith, Pharm.D., M.B.A., BCACP  MTM Pharmacist, [facility]  Pager: [phone]  Email: " [email or web address]

## 2024-08-07 ENCOUNTER — MEDICAL CORRESPONDENCE (OUTPATIENT)
Dept: HEALTH INFORMATION MANAGEMENT | Facility: CLINIC | Age: 69
End: 2024-08-07
Payer: COMMERCIAL

## 2024-08-08 ENCOUNTER — TELEPHONE (OUTPATIENT)
Dept: FAMILY MEDICINE | Facility: CLINIC | Age: 69
End: 2024-08-08
Payer: COMMERCIAL

## 2024-08-08 NOTE — TELEPHONE ENCOUNTER
Forms/Letter Request    Type of form/letter: Physician Order # [id number]- Verbal orders  to Ok to Delay Start of Skilled OT Home Health Services 1W1 To 8/8/24       Do we have the form/letter: Yes:    Who is the form from? [facility]    Where did/will the form come from? form was faxed in    When is form/letter needed by: asap    How would you like the form/letter returned: Fax : [phone]

## 2024-08-08 NOTE — TELEPHONE ENCOUNTER
Reason for Call:  Home Health Care    Conrado  with [facility] called regarding (reason for call): verbal orders    Orders are needed for this patient. Yes    PT: N/A    OT: Delay until  8/8/24    Skilled Nursing: N/A    Pt Provider: Radha Mcdermott    Phone Number Homecare Nurse can be reached at: [phone]    Can we leave a detailed message on this number? YES        Best Time: Today    Call taken on 8/8/2024 at 11:59 AM by Genie Laureano

## 2024-08-09 NOTE — TELEPHONE ENCOUNTER
DE,  Please advise on below request for OT orders      Home Care is calling regarding an established patient with [facility].    Contact: Vishnu at [phone]     Requesting orders from: Sherwin Mejia  Provider is following patient: Yes  Is this a 60-day recertification request?  No    Orders Requested    Occupational Therapy  Request for initial certification (first set of orders)   Frequency:  one x/wk for three wks    Will work on standing tolerance, sitting tolerance, meal prep, kitchen management, and dish washing.      Confirmed ok to leave a detailed message with call back.  Contact information confirmed and updated as needed.    Aggie Smith RN

## 2024-08-12 ENCOUNTER — MEDICAL CORRESPONDENCE (OUTPATIENT)
Dept: HEALTH INFORMATION MANAGEMENT | Facility: CLINIC | Age: 69
End: 2024-08-12
Payer: COMMERCIAL

## 2024-08-14 ENCOUNTER — MEDICAL CORRESPONDENCE (OUTPATIENT)
Dept: HEALTH INFORMATION MANAGEMENT | Facility: CLINIC | Age: 69
End: 2024-08-14
Payer: COMMERCIAL

## 2024-08-14 ENCOUNTER — TELEPHONE (OUTPATIENT)
Dept: FAMILY MEDICINE | Facility: CLINIC | Age: 69
End: 2024-08-14
Payer: COMMERCIAL

## 2024-08-14 NOTE — TELEPHONE ENCOUNTER
Forms/Letter Request    Type of form/letter: OTHER: add on discipline  Order # [id number]  PT effective 7/28/24        Do we have the form/letter: Yes: w/ DE    Who is the form from? [facility] (if other please explain)    Where did/will the form come from? form was faxed in    When is form/letter needed by: asap    How would you like the form/letter returned: Fax : [phone]

## 2024-08-15 ENCOUNTER — NURSE TRIAGE (OUTPATIENT)
Dept: FAMILY MEDICINE | Facility: CLINIC | Age: 69
End: 2024-08-15
Payer: COMMERCIAL

## 2024-08-15 NOTE — TELEPHONE ENCOUNTER
Homecare RN Ifeoma calling with update on patient.    Manual BP was 184/118 when she took it  Patient's BP was 207/134 on automatic cuff that patient has.    Not having any symptoms.  Per RN, has not taken blood pressure medication for several weeks.  RN has instructed him to go to emergency room.  Patient declined, took all blood pressure medication and would like to reassess in a few hours.    Triage RN informed that recommendation from clinic would also be for him to be seen.  Homecare RN will let patient know.    If unwilling to go to emergency room, homecare RN will urge patient to at least call clinic with updated blood pressure number in a few hours.    Aggie LAMAS RN     Reason for Disposition   Systolic BP >= 200 OR Diastolic >= 120 and having NO cardiac or neurologic symptoms    Additional Information   Negative: Sounds like a life-threatening emergency to the triager   Negative: Symptom is main concern (e.g., headache, chest pain)   Negative: Low blood pressure is main concern   Negative: Systolic BP >= 160 OR Diastolic >= 100, and any cardiac (e.g., breathing difficulty, chest pain) or neurologic symptoms (e.g., new-onset blurred or double vision)   Negative: Pregnant 20 or more weeks (or postpartum < 6 weeks) with new hand or face swelling   Negative: Pregnant 20 or more weeks (or postpartum < 6 weeks) and Systolic BP >= 160 OR Diastolic >= 110   Negative: Patient sounds very sick or weak to the triager    Protocols used: Blood Pressure - High-A-OH

## 2024-08-16 NOTE — TELEPHONE ENCOUNTER
Spoke with patient.  Last blood pressure 182/116  Took meds this morning.  RN encouraged follow-up, patient declined at this time.  Will call back with continued high blood pressure.  Aggie LAMAS RN

## 2024-08-19 ENCOUNTER — TELEPHONE (OUTPATIENT)
Dept: FAMILY MEDICINE | Facility: CLINIC | Age: 69
End: 2024-08-19
Payer: COMMERCIAL

## 2024-08-19 NOTE — TELEPHONE ENCOUNTER
Reason for Call:  Home Health Care    Conrado with [facility] called regarding (reason for call): verbal orders    Orders are needed for this patient. OT    PT: N/A    OT: Recertification With Add Ingrid  1W1  Start 8/19/24    Skilled Nursing: N/A    Pt Provider: Radha    Phone Number Homecare Nurse can be reached at: [phone]    Can we leave a detailed message on this number? YES

## 2024-08-20 ENCOUNTER — MEDICAL CORRESPONDENCE (OUTPATIENT)
Dept: HEALTH INFORMATION MANAGEMENT | Facility: CLINIC | Age: 69
End: 2024-08-20
Payer: COMMERCIAL

## 2024-08-23 ENCOUNTER — TELEPHONE (OUTPATIENT)
Dept: FAMILY MEDICINE | Facility: CLINIC | Age: 69
End: 2024-08-23
Payer: COMMERCIAL

## 2024-08-23 NOTE — TELEPHONE ENCOUNTER
Reason for Call:  Home Health Care    Konrad with [facility] called regarding (reason for call): verbal order    Orders are needed for this patient. Yes    PT: Continue PT 1X2 For Strength, Balance, and [place]    OT: N/A    Skilled Nursing: N/A    Pt Provider: Radha Kaplan Number Homecare Nurse can be reached at: [phone]    Can we leave a detailed message on this number? YES        Call taken on 8/23/2024 at 11:53 AM by Genie Laureano

## 2024-08-26 ENCOUNTER — MEDICAL CORRESPONDENCE (OUTPATIENT)
Dept: HEALTH INFORMATION MANAGEMENT | Facility: CLINIC | Age: 69
End: 2024-08-26
Payer: COMMERCIAL

## 2024-08-26 ENCOUNTER — TELEPHONE (OUTPATIENT)
Dept: FAMILY MEDICINE | Facility: CLINIC | Age: 69
End: 2024-08-26
Payer: COMMERCIAL

## 2024-08-26 NOTE — TELEPHONE ENCOUNTER
Forms/Letter Request    Type of form/letter: OTHER: refill of supplies  CNFM BNDG non-elst KNT/WVE quantity ordered 31.25 YD refill         Do we have the form/letter: Yes: in DE's office    Who is the form from? [facility] (if other please explain)    Where did/will the form come from? form was faxed in    When is form/letter needed by: asap    How would you like the form/letter returned: Fax : [phone]

## 2024-08-26 NOTE — TELEPHONE ENCOUNTER
Verbal orders provided on confidential voicemail for NICK Silvestre [facility]  Thanks,  Divina AGRAWAL RN

## 2024-08-27 ENCOUNTER — ONCOLOGY VISIT (OUTPATIENT)
Dept: TRANSPLANT | Facility: CLINIC | Age: 69
End: 2024-08-27
Attending: STUDENT IN AN ORGANIZED HEALTH CARE EDUCATION/TRAINING PROGRAM
Payer: COMMERCIAL

## 2024-08-27 VITALS
OXYGEN SATURATION: 96 % | WEIGHT: 246.5 LBS | TEMPERATURE: 97.6 F | DIASTOLIC BLOOD PRESSURE: 94 MMHG | RESPIRATION RATE: 16 BRPM | HEART RATE: 79 BPM | BODY MASS INDEX: 39.79 KG/M2 | SYSTOLIC BLOOD PRESSURE: 156 MMHG

## 2024-08-27 DIAGNOSIS — Z79.4 TYPE 2 DIABETES MELLITUS WITH DIABETIC POLYNEUROPATHY, WITH LONG-TERM CURRENT USE OF INSULIN (H): ICD-10-CM

## 2024-08-27 DIAGNOSIS — E85.81 LIGHT CHAIN (AL) AMYLOIDOSIS (H): Primary | ICD-10-CM

## 2024-08-27 DIAGNOSIS — E11.42 TYPE 2 DIABETES MELLITUS WITH DIABETIC POLYNEUROPATHY, WITH LONG-TERM CURRENT USE OF INSULIN (H): ICD-10-CM

## 2024-08-27 DIAGNOSIS — Z94.84 HISTORY OF PERIPHERAL STEM CELL TRANSPLANT (H): ICD-10-CM

## 2024-08-27 PROCEDURE — 99214 OFFICE O/P EST MOD 30 MIN: CPT | Performed by: STUDENT IN AN ORGANIZED HEALTH CARE EDUCATION/TRAINING PROGRAM

## 2024-08-27 PROCEDURE — G2211 COMPLEX E/M VISIT ADD ON: HCPCS | Performed by: STUDENT IN AN ORGANIZED HEALTH CARE EDUCATION/TRAINING PROGRAM

## 2024-08-27 PROCEDURE — G0463 HOSPITAL OUTPT CLINIC VISIT: HCPCS | Performed by: STUDENT IN AN ORGANIZED HEALTH CARE EDUCATION/TRAINING PROGRAM

## 2024-08-27 ASSESSMENT — PAIN SCALES - GENERAL: PAINLEVEL: SEVERE PAIN (6)

## 2024-08-27 NOTE — LETTER
"8/27/2024      Parmjit Hernández  [address]  [place] [address]      Dear Colleague,    Thank you for referring your patient, Parmjit Hernández, to the [facility] BLOOD AND MARROW TRANSPLANT PROGRAM [place]. Please see a copy of my visit note below.         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      Disease presentation and baseline characteristics:   Per [facility] Records:  \"1. May 2015, patient developed increasing right upper quadrant pain. Endoscopy performed in 2015 was reportedly unremarkable. An enlarged lymph node was seen, but biopsy was negative in 2015. He subsequently developed increasing pain in later in 2015 which required multiple visits to an Emergency Room for further management.  2. September 2015, patient was seen in [facility] ED where he received an ultrasound of the abdomen that demonstrated surgically absent gallbladder with a slightly enlarged pancreas. There were mildly enlarged peripancreatic lymph nodes measuring 3.4 x 1.3 cm in size which were thought to be indeterminate.   3. April 29, 2016, patient underwent an endoscopic EUS that demonstrated modest enlargement of lymph nodes. Reportedly the biopsy was consistent with extracellular material; however, positive for amyloid stains. No monoclonal lymphocytes were seen in other specimens. Testing at [facility] reportedly demonstrated AL kappa amyloidosis subtype in the lymph nodes.  4. June 6, 2016, skeletal survey demonstrated a 1.4 cm focal lytic lesion in the right radial diaphysis and possible diffuse tiny subcentimeter lytic lesions throughout the calvarium. Blood work that time demonstrated a reduced IgG (276), IgM (12), and IgA (44). No monoclonal protein or free light chain was seen in the serum or urine.  5. June 8, 2016, bone marrow biopsy demonstrated normocellular bone marrow with 50% cellularity. Plasma cells were 5% with 4% kappa monotypic features on flow " "cytometry. The vessel walls were noted to be thick with positive Congo staining consistent with primary amyloidosis. Chromosomes demonstrated a normal 46 XY in 20 (out of 20) metaphases. FISH however demonstrated t(11;14) along with a monosomy 13 consistent with a plasma cell neoplasm.   6. June 23, 2016: He presented to [facility] for a second opinion on the suspected AL amyloidosis. The PET/CT from June 27, 2016 was negative for lytic lesions. Congo red stain was performed on the biopsies from the subcarinal lymph nodes, which were positive for amyloid deposition. Mass spectrometry revealed the specimen to be positive for AL (kappa) type amyloid. There was no definitive evidence for cardiac involvement.\"    Date Treatment Name Response Side Effects / Toxicities   8/1/2016 CyBorD VGPR    12/27/2016 HDT/ASCT CR           HPI:  Please see my entry above for hematologic history.  Mr. Hernández presents today for follow up.  He underwent L5-S1 fusion on 5/31/24. He was previously seen in 10/2023. He was recently admitted (7/11-7/13) to the hospital with fall, back pain and leg weakness. Per MRI and examination, cord compression was felt unlikely, symptoms were attributed to chronic spinal stenosis s/p fusion and chronic opioid dependence. He was discharged to TCU. He also had an acute kidney injury due to hypovolemia while on ACEi and diuretic- this improved to his baseline creatinine. He was admitted in 6/2024 due to leg swelling/cellulitis. His diabetes remains under poor control, most recent HbA1C on 7/9/24 of 9.4.    Today Mr. Hernández reports he is doing relatively well.  He has chronic back pain but this has not changed recently.  He denies tongue thickening, nausea/vomiting, diarrhea/constipation, numbness/tingling, rashes/skin changes, abdominal swelling or other acute concerns.  He continues to work with his PCP on controlling his DM and knows he is still above goal.      ASSESSMENT AND PLAN:  Restaging labs and " physical exam show no evidence of biochemical or clinical relapse.  No indication for treatment currently.    We discussed the management of relapsed AL amyloidosis if/when it becomes necessary.  He does have 5.19 x 10^6 CD45 cells/kg in storage at [place].    We'll plan to recheck amyloid labs in approximately 6 months.  Mr. Hernández is aware to call if he has any new concerns prior.    Plan:   Follow up in 6 months with labs one week prior    I spent 30 minutes in the care of this patient today, which included time necessary for preparation for the visit, obtaining history, ordering medications/tests/procedures as medically indicated, review of pertinent medical literature, counseling of the patient, communication of recommendations to the care team, and documentation time.    Charly Hester MD    ROS:    10 point ROS neg other than the symptoms noted above in the HPI.      Current Outpatient Medications   Medication Sig Dispense Refill     aspirin (ASPIRIN LOW DOSE) 81 MG tablet Take 1 tablet (81 mg) by mouth daily 30 tablet 3     atorvastatin (LIPITOR) 20 MG tablet Take 1.5 tablets (30 mg) by mouth daily 135 tablet 3     buPROPion (WELLBUTRIN XL) 150 MG 24 hr tablet Take 1 tablet (150 mg) by mouth every morning 90 tablet 3     cariprazine (VRAYLAR) 4.5 MG capsule Take 1 capsule (4.5 mg) by mouth daily 90 capsule 3     Continuous Glucose Sensor (FREESTYLE VERA 3 SENSOR) MISC 1 each every 14 days Apply 1 sensor every 14 days 2 each 3     DULoxetine (CYMBALTA) 30 MG capsule Take 1 capsule (30 mg) by mouth 2 times daily 180 capsule 3     furosemide (LASIX) 20 MG tablet Take 2 tablets (40 mg) daily x 7 days. Then take 1  tablet (20 mg) daily 90 tablet 0     hydrochlorothiazide (HYDRODIURIL) 25 MG tablet Take 1.5 tablets (37.5 mg) by mouth daily 135 tablet 3     insulin glargine 100 UNIT/ML pen Inject 54 Units Subcutaneous at bedtime (Max 60 units) 75 mL 3     insulin pen needle (31G X 8 MM) 31G X 8 MM miscellaneous Use  one pen needles daily or as directed. 100 each 1     loperamide (IMODIUM) 2 MG capsule Take 1 capsule (2 mg) by mouth 4 times daily as needed for diarrhea       losartan (COZAAR) 100 MG tablet Take 1 tablet (100 mg) by mouth daily 90 tablet 3     medication given by implanted intrathecal pump continuous Drug # 1: Fentanyl (Sublimaze) - Conc: 2000 mcg/mL - Total Dose / 24 hours: 1663.3 mcg  Drug # 2: Bupivacaine (Marcaine)  - Conc: 20 mg/mL - Total Dose / 24 hours: 16.633 mg  Drug # 3: Morphine (Duramorph or Infumorph)  - Conc: 9.6 mg/mL - Total Dose / 24 hours: 7.9836 mg    Pump Reservoir Volume: 40 mL  Pump Reservoir Alarm Volume: 2 ml  Outside Clinic & Provider: Dr. Pawan Shaw, [facility]  Last Refill Date: 12/21/23  Next Refill Date: 1/28/24  Low [place] Alarm Date:  Pump : ITT EXIM SynchroMed II    Boluses: Up to 3 per day, 4 minute duration. Lock-out every 6 hours (also has dose restriction interval noted 1 per 8 hours)   Fentanyl: 110.1 mcg/dose  Bupivacaine: 1.101 mg/dose  Morphine: 0.5287 mg/dose       metFORMIN (GLUCOPHAGE XR) 500 MG 24 hr tablet Take 2 tablets (1,000 mg) by mouth 2 times daily (with meals) 360 tablet 3     metoprolol succinate ER (TOPROL XL) 100 MG 24 hr tablet Take 1 tablet (100 mg) by mouth daily 90 tablet 3     modafinil (PROVIGIL) 200 MG tablet Take 2 tablets (400 mg) by mouth daily Pt has increased his dose to 400mg daily from 300mg daily.  Has a message in to his physician for a dose increase. 60 tablet 2     mometasone (ELOCON) 0.1 % external cream Apply topically daily as needed For psoriasis on face       nitroGLYcerin (NITROSTAT) 0.4 MG sublingual tablet Place 1 tablet (0.4 mg) under the tongue every 5 minutes as needed for chest pain For chest pain place 1 tablet under the tongue every 5 minutes for 3 doses. If symptoms persist 5 minutes after 1st dose call 911. 30 tablet 1     oxyCODONE (ROXICODONE) 5 MG tablet Take 5 mg by mouth every 6 hours as needed  for moderate to severe pain       oxyCODONE IR (ROXICODONE) 10 MG tablet Take 10 mg by mouth every 6 hours as needed for moderate to severe pain       pregabalin (LYRICA) 100 MG capsule Take 1 capsule (100 mg) by mouth at bedtime       semaglutide (OZEMPIC) 2 MG/3ML pen Inject 0.5 mg Subcutaneous every 7 days 3 mL 2     SKYRIZI  MG/ML subcutaneous Inject 150 mg Subcutaneous once       SUMAtriptan (IMITREX) 50 MG tablet Take 1 tablet (50 mg) by mouth at onset of headache for migraine May repeat in 2 hours. Max 4 tablets/24 hours. 8 tablet 1     tamsulosin (FLOMAX) 0.4 MG capsule Take 1 capsule (0.4 mg) by mouth 2 times daily 180 capsule 3     testosterone (ANDROGEL/TESTIM) 50 MG/5GM (1%) topical gel Place 1 packet (50 mg of testosterone) onto the skin daily 30 packet 1     vitamin B-Complex Take 3 tablets by mouth daily       vitamin D3 (CHOLECALCIFEROL) 50 mcg (2000 units) tablet Take 1 tablet (50 mcg) by mouth daily 90 tablet 3         Physical Exam:     Vital Signs: BP (!) 156/94 (BP Location: Right arm, Patient Position: Sitting, Cuff Size: Adult Large)   Pulse 79   Temp 97.6  F (36.4  C) (Oral)   Resp 16   Wt 111.8 kg (246 lb 8 oz)   SpO2 96%   BMI 39.79 kg/m      ECO  General Appearance: alert, and no distress  Eyes: PERRL, conjunctiva and lids normal, sclera nonicteric  Ears/Nose/M/Throat: Oral mucosa and posterior oropharynx normal, moist mucous membranes  Cardio/Vascular:regular rate and rhythm, normal S1 and S2, no murmur  Resp Effort And Auscultation: Normal - Clear to auscultation without rales, rhonchi, or wheezing  Edema: none  Skin: Skin color, texture, turgor normal. No rashes or lesions in visualized areas  Neurologic: Gait normal.  Sensation grossly WNL.  Psych/Affect: Mood and affect are appropriate.    Blood Counts     Recent Labs   Lab Test 24  0903 24  2233 24  1451 23  0747 23  1442   HGB 13.8 13.7 13.1* 12.7* 13.1*   HCT 39.1* 39.1* 37.3* 37.7*  37.8*   WBC 7.1 5.5 9.0 7.2 15.4*   ANEUTAUTO 5.6 3.8  --  6.0 13.7*   ALYMPAUTO 0.8 1.0  --  0.7* 0.8   AMONOAUTO 0.5 0.3  --  0.3 0.7   AEOSAUTO 0.2 0.3  --  0.1 0.1   ABSBASO 0.0 0.0  --  0.0 0.1   NRBCMAN  --  0.0  --  0.0 0.0    176 143* 127* 153       Chemistries     Basic Panel  Recent Labs   Lab Test 07/09/24 0903 06/23/24 2233 05/06/24  1451   * 137 134*   POTASSIUM 3.8 4.2 3.9   CHLORIDE 94* 98 94*   CO2 27 31* 28   BUN 24.8* 24.0* 21.0   CR 0.98 1.17 1.05   * 171* 247*        Calcium, Magnesium, Phosphorus  Recent Labs   Lab Test 07/09/24 0903 06/23/24 2233 05/06/24  1451 08/01/23  1146 07/25/23  1004 05/30/23  1510 05/21/23  0704 05/20/23  0706 05/19/23  0949 05/04/20  1135 04/22/20  0636   LUIS 9.3 8.9 9.3   < > 9.7   < >  --   --  9.0   < > 8.6   MAG  --   --   --   --  1.7  --  1.9 1.9 1.9   < > 1.8   PHOS  --   --   --   --   --   --   --   --  2.7  --  3.3    < > = values in this interval not displayed.        LFTs  Recent Labs   Lab Test 07/09/24 0903 06/23/24 2233 05/06/24  1451   BILITOTAL 0.5 0.4 0.6   ALKPHOS 179* 243* 100   AST 41 39 48*   ALT 73* 54 65   ALBUMIN 4.3 4.3 4.1       Immunoglobulins     Recent Labs   Lab Test 10/04/23  1439 03/17/23  1153 05/09/19  1202 02/13/19  1410   * 490* 515* 577*       Recent Labs   Lab Test 10/04/23  1439 03/17/23  1153 05/09/19  1202 02/13/19  1410   IGA 52* 50* 38* 41*       Recent Labs   Lab Test 10/04/23  1439 03/17/23  1153 05/09/19  1202 02/13/19  1410   IGM 32* 72 39* 40*         Monoclonal Protein Studies     M spike    Recent Labs   Lab Test 07/09/24  0903 10/04/23  1439 03/17/23  1153 05/09/19  1202 02/13/19  1410 08/28/18  1259   ELPM 0.0 0.0 0.0 0.0 0.0 0.0       Hawi FLC    Recent Labs   Lab Test 07/09/24  0903 10/04/23  1439 03/17/23  1153   KFLCA 1.45 1.49 1.65       Lambda FLC    Recent Labs   Lab Test 07/09/24  0903 10/04/23  1439 03/17/23  1153   LFLCA 1.35 1.05 1.66       FLC Ratio    Recent Labs   Lab  Test 07/09/24  0903 10/04/23  1439 03/17/23  1153   KLRA 1.07 1.42 0.99       The longitudinal plan of care for the diagnosis(es)/condition(s) as documented were addressed during this visit. Due to the added complexity in care, I will continue to support Erickson in the subsequent management and with ongoing continuity of care.    ------------------------------------------------------------------------------------------------------------------------------------------------    Patient Care Team         Relationship Specialty Notifications Start End    Sherwin Mejia DO PCP - General Family Medicine  3/1/23     zreferred ot Derm    Phone: [phone] Fax: [phone] [address] [address]    Kuldeep Borrero MD MD Gastroenterology  1/11/16     Phone: [phone] Fax: [phone]         [address] [place], [place] [address]B [address] [place] [address]    Sherwin Mejia DO Assigned PCP   3/11/23     Phone: [phone] Fax: [phone] [address] [address]    Lisseth Arana DPM Assigned Surgical Provider   3/11/23     Phone: [phone] Fax: [phone]         [address] [place] [address]    Dylan Jose MD MD Neurology  3/15/23     Phone: [phone] Fax: [phone]         [address] [place] [address]    Kera Jernigan APRN CNP Nurse Practitioner Cardiovascular Disease  3/16/23     Phone: [phone] Fax: [phone] [address] [facility] [address]    Charly Hester MD Assigned Cancer Care Provider   3/25/23     Phone: [phone] Fax: [phone]         [address] [facility] [address] [facility] [address]    Diana Bobo, RN Specialty Care Coordinator Hematology & Oncology Admissions 3/27/23     Rodrick Ferrer MD Assigned Heart and Vascular Provider   4/8/23     Phone: [phone] Fax: [phone] [address] [facility] [address]     Dylan Jose MD Assigned Neuroscience Provider   9/23/23     Phone: [phone] Fax: [phone]         [address] [facility] [address]    Dhruv Corea MD MD Pulmonary Disease  11/6/23     Phone: [phone] Fax: [phone]         [address] [facility] [address]    Minda Smith [facility] Assigned MTM Pharmacist   11/11/23     Phone: [phone] Fax: [phone]         [facility], [address] [address]    Midna Smith [facility] Pharmacist Pharmacist  11/17/23     Phone: [phone] Fax: [phone]         [facility] [address], [address] [address]    Marivel Walter PARonaldoC Physician Assistant Dermatology  12/14/23     Phone: [phone] Fax: [phone]         [address] [place] [address] [address], [facility] [address] [facility] [address]    Chantel Montgomery LICSW Lead Care Coordinator  Admissions 12/28/23     Phone: [phone]         Alecia Hanley CHW Community Health Worker  Admissions 1/31/24     Phone: [phone]         Dhruv Corea MD Assigned Pulmonology Provider   2/23/24     Phone: [phone] Fax: [phone]         [address] [facility] [address]    Lindsey Haddad, PhD Psychologist Neuropsychology  7/10/24     Phone: [phone] Fax: [phone]         [address] [place] [address]              The longitudinal plan of care for the diagnosis(es)/condition(s) as documented were addressed during this visit. Due to the added complexity in care, I will continue to support Erickson in the subsequent management and with ongoing continuity of care.  ------------------------------------------------------------------------------------------------------------------------------------------------    Patient Care Team         Relationship Specialty Notifications Start End    Sherwin Mejia DO PCP - General Family Medicine  3/1/23     Phone: [phone] Fax: [phone] [address] [address]     Kuldeep Borrero MD MD Gastroenterology  1/11/16     Phone: [phone] Fax: [phone]         [address] [facility] [address] [facility] [address]    Vince Henry MD Assigned PCP   2/3/19     Phone: [phone] Fax: [phone] [address] [place] [address] [facility] [address]    Dylan Jose MD MD Neurology  3/15/23     Phone: [phone] Fax: [phone]         [address] [facility] [address]    Kera Jernigan APRN CNP Nurse Practitioner Cardiovascular Disease  3/16/23     Phone: [phone] Fax: [phone] [address] [facility] [address]              Again, thank you for allowing me to participate in the care of your patient.        Sincerely,        Charly Hester MD

## 2024-08-27 NOTE — NURSING NOTE
"Oncology Rooming Note    August 27, 2024 8:20 AM   Parmjit Hernández is a 69 year old male who presents for:    Chief Complaint   Patient presents with    Oncology Clinic Visit     AL amyloidosis     Initial Vitals: BP (!) 156/94 (BP Location: Right arm, Patient Position: Sitting, Cuff Size: Adult Large)   Pulse 79   Temp 97.6  F (36.4  C) (Oral)   Resp 16   Wt 111.8 kg (246 lb 8 oz)   SpO2 96%   BMI 39.79 kg/m   Estimated body mass index is 39.79 kg/m  as calculated from the following:    Height as of 6/23/24: 1.676 m (5' 6\").    Weight as of this encounter: 111.8 kg (246 lb 8 oz). Body surface area is 2.28 meters squared.  Severe Pain (6) Comment: Data Unavailable   No LMP for male patient.  Allergies reviewed: Yes  Medications reviewed: Yes    Medications: Medication refills not needed today.  Pharmacy name entered into [facility]:    [facility] [place] - [place], [place] - [address]  [id number] - [place], [place] - [address] [facility] [place], [facility] - [place], [place] - [address]    Frailty Screening:   Is the patient here for a new oncology consult visit in cancer care? 2. No      Clinical concerns: none       Justa Hale            "

## 2024-08-27 NOTE — PROGRESS NOTES
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      Disease presentation and baseline characteristics:   Per [place] Records:  \"1. May 2015, patient developed increasing right upper quadrant pain. Endoscopy performed in 2015 was reportedly unremarkable. An enlarged lymph node was seen, but biopsy was negative in 2015. He subsequently developed increasing pain in later in 2015 which required multiple visits to an Emergency Room for further management.  2. September 2015, patient was seen in [place] ED where he received an ultrasound of the abdomen that demonstrated surgically absent gallbladder with a slightly enlarged pancreas. There were mildly enlarged peripancreatic lymph nodes measuring 3.4 x 1.3 cm in size which were thought to be indeterminate.   3. April 29, 2016, patient underwent an endoscopic EUS that demonstrated modest enlargement of lymph nodes. Reportedly the biopsy was consistent with extracellular material; however, positive for amyloid stains. No monoclonal lymphocytes were seen in other specimens. Testing at [place] reportedly demonstrated AL kappa amyloidosis subtype in the lymph nodes.  4. June 6, 2016, skeletal survey demonstrated a 1.4 cm focal lytic lesion in the right radial diaphysis and possible diffuse tiny subcentimeter lytic lesions throughout the calvarium. Blood work that time demonstrated a reduced IgG (276), IgM (12), and IgA (44). No monoclonal protein or free light chain was seen in the serum or urine.  5. June 8, 2016, bone marrow biopsy demonstrated normocellular bone marrow with 50% cellularity. Plasma cells were 5% with 4% kappa monotypic features on flow cytometry. The vessel walls were noted to be thick with positive Congo staining consistent with primary amyloidosis. Chromosomes demonstrated a normal 46 XY in 20 (out of 20) metaphases. FISH however demonstrated t(11;14) along with a monosomy 13 consistent with a plasma cell " "neoplasm.   6. June 23, 2016: He presented to [facility] for a second opinion on the suspected AL amyloidosis. The PET/CT from June 27, 2016 was negative for lytic lesions. Congo red stain was performed on the biopsies from the subcarinal lymph nodes, which were positive for amyloid deposition. Mass spectrometry revealed the specimen to be positive for AL (kappa) type amyloid. There was no definitive evidence for cardiac involvement.\"    Date Treatment Name Response Side Effects / Toxicities   8/1/2016 CyBorD VGPR    12/27/2016 HDT/ASCT CR           HPI:  Please see my entry above for hematologic history.  Mr. Hernández presents today for follow up.  He underwent L5-S1 fusion on 5/31/24. He was previously seen in 10/2023. He was recently admitted (7/11-7/13) to the hospital with fall, back pain and leg weakness. Per MRI and examination, cord compression was felt unlikely, symptoms were attributed to chronic spinal stenosis s/p fusion and chronic opioid dependence. He was discharged to TCU. He also had an acute kidney injury due to hypovolemia while on ACEi and diuretic- this improved to his baseline creatinine. He was admitted in 6/2024 due to leg swelling/cellulitis. His diabetes remains under poor control, most recent HbA1C on 7/9/24 of 9.4.    Today Mr. Hernández reports he is doing relatively well.  He has chronic back pain but this has not changed recently.  He denies tongue thickening, nausea/vomiting, diarrhea/constipation, numbness/tingling, rashes/skin changes, abdominal swelling or other acute concerns.  He continues to work with his PCP on controlling his DM and knows he is still above goal.      ASSESSMENT AND PLAN:  Restaging labs and physical exam show no evidence of biochemical or clinical relapse.  No indication for treatment currently.    We discussed the management of relapsed AL amyloidosis if/when it becomes necessary.  He does have 5.19 x 10^6 CD45 cells/kg in storage at [facility].    We'll plan to recheck " amyloid labs in approximately 6 months.  Mr. Hernández is aware to call if he has any new concerns prior.    Plan:   Follow up in 6 months with labs one week prior    I spent 30 minutes in the care of this patient today, which included time necessary for preparation for the visit, obtaining history, ordering medications/tests/procedures as medically indicated, review of pertinent medical literature, counseling of the patient, communication of recommendations to the care team, and documentation time.    Charly Hester MD    ROS:    10 point ROS neg other than the symptoms noted above in the HPI.      Current Outpatient Medications   Medication Sig Dispense Refill    aspirin (ASPIRIN LOW DOSE) 81 MG tablet Take 1 tablet (81 mg) by mouth daily 30 tablet 3    atorvastatin (LIPITOR) 20 MG tablet Take 1.5 tablets (30 mg) by mouth daily 135 tablet 3    buPROPion (WELLBUTRIN XL) 150 MG 24 hr tablet Take 1 tablet (150 mg) by mouth every morning 90 tablet 3    cariprazine (VRAYLAR) 4.5 MG capsule Take 1 capsule (4.5 mg) by mouth daily 90 capsule 3    Continuous Glucose Sensor (FREESTYLE VERA 3 SENSOR) MISC 1 each every 14 days Apply 1 sensor every 14 days 2 each 3    DULoxetine (CYMBALTA) 30 MG capsule Take 1 capsule (30 mg) by mouth 2 times daily 180 capsule 3    furosemide (LASIX) 20 MG tablet Take 2 tablets (40 mg) daily x 7 days. Then take 1  tablet (20 mg) daily 90 tablet 0    hydrochlorothiazide (HYDRODIURIL) 25 MG tablet Take 1.5 tablets (37.5 mg) by mouth daily 135 tablet 3    insulin glargine 100 UNIT/ML pen Inject 54 Units Subcutaneous at bedtime (Max 60 units) 75 mL 3    insulin pen needle (31G X 8 MM) 31G X 8 MM miscellaneous Use one pen needles daily or as directed. 100 each 1    loperamide (IMODIUM) 2 MG capsule Take 1 capsule (2 mg) by mouth 4 times daily as needed for diarrhea      losartan (COZAAR) 100 MG tablet Take 1 tablet (100 mg) by mouth daily 90 tablet 3    medication given by implanted intrathecal pump  continuous Drug # 1: Fentanyl (Sublimaze) - Conc: 2000 mcg/mL - Total Dose / 24 hours: 1663.3 mcg  Drug # 2: Bupivacaine (Marcaine)  - Conc: 20 mg/mL - Total Dose / 24 hours: 16.633 mg  Drug # 3: Morphine (Duramorph or Infumorph)  - Conc: 9.6 mg/mL - Total Dose / 24 hours: 7.9836 mg    Pump Reservoir Volume: 40 mL  Pump Reservoir Alarm Volume: 2 ml  Outside Clinic & Provider: [facility]  Last Refill Date: 12/21/23  Next Refill Date: 1/28/24  Low [place] Alarm Date:  Pump : CT Atlantic SynchroMed II    Boluses: Up to 3 per day, 4 minute duration. Lock-out every 6 hours (also has dose restriction interval noted 1 per 8 hours)   Fentanyl: 110.1 mcg/dose  Bupivacaine: 1.101 mg/dose  Morphine: 0.5287 mg/dose      metFORMIN (GLUCOPHAGE XR) 500 MG 24 hr tablet Take 2 tablets (1,000 mg) by mouth 2 times daily (with meals) 360 tablet 3    metoprolol succinate ER (TOPROL XL) 100 MG 24 hr tablet Take 1 tablet (100 mg) by mouth daily 90 tablet 3    modafinil (PROVIGIL) 200 MG tablet Take 2 tablets (400 mg) by mouth daily Pt has increased his dose to 400mg daily from 300mg daily.  Has a message in to his physician for a dose increase. 60 tablet 2    mometasone (ELOCON) 0.1 % external cream Apply topically daily as needed For psoriasis on face      nitroGLYcerin (NITROSTAT) 0.4 MG sublingual tablet Place 1 tablet (0.4 mg) under the tongue every 5 minutes as needed for chest pain For chest pain place 1 tablet under the tongue every 5 minutes for 3 doses. If symptoms persist 5 minutes after 1st dose call 911. 30 tablet 1    oxyCODONE (ROXICODONE) 5 MG tablet Take 5 mg by mouth every 6 hours as needed for moderate to severe pain      oxyCODONE IR (ROXICODONE) 10 MG tablet Take 10 mg by mouth every 6 hours as needed for moderate to severe pain      pregabalin (LYRICA) 100 MG capsule Take 1 capsule (100 mg) by mouth at bedtime      semaglutide (OZEMPIC) 2 MG/3ML pen Inject 0.5 mg Subcutaneous every  7 days 3 mL 2    SKYRIZI  MG/ML subcutaneous Inject 150 mg Subcutaneous once      SUMAtriptan (IMITREX) 50 MG tablet Take 1 tablet (50 mg) by mouth at onset of headache for migraine May repeat in 2 hours. Max 4 tablets/24 hours. 8 tablet 1    tamsulosin (FLOMAX) 0.4 MG capsule Take 1 capsule (0.4 mg) by mouth 2 times daily 180 capsule 3    testosterone (ANDROGEL/TESTIM) 50 MG/5GM (1%) topical gel Place 1 packet (50 mg of testosterone) onto the skin daily 30 packet 1    vitamin B-Complex Take 3 tablets by mouth daily      vitamin D3 (CHOLECALCIFEROL) 50 mcg (2000 units) tablet Take 1 tablet (50 mcg) by mouth daily 90 tablet 3         Physical Exam:     Vital Signs: BP (!) 156/94 (BP Location: Right arm, Patient Position: Sitting, Cuff Size: Adult Large)   Pulse 79   Temp 97.6  F (36.4  C) (Oral)   Resp 16   Wt 111.8 kg (246 lb 8 oz)   SpO2 96%   BMI 39.79 kg/m      ECO  General Appearance: alert, and no distress  Eyes: PERRL, conjunctiva and lids normal, sclera nonicteric  Ears/Nose/M/Throat: Oral mucosa and posterior oropharynx normal, moist mucous membranes  Cardio/Vascular:regular rate and rhythm, normal S1 and S2, no murmur  Resp Effort And Auscultation: Normal - Clear to auscultation without rales, rhonchi, or wheezing  Edema: none  Skin: Skin color, texture, turgor normal. No rashes or lesions in visualized areas  Neurologic: Gait normal.  Sensation grossly WNL.  Psych/Affect: Mood and affect are appropriate.    Blood Counts     Recent Labs   Lab Test 24  0903 24  2233 24  1451 23  0747 23  1442   HGB 13.8 13.7 13.1* 12.7* 13.1*   HCT 39.1* 39.1* 37.3* 37.7* 37.8*   WBC 7.1 5.5 9.0 7.2 15.4*   ANEUTAUTO 5.6 3.8  --  6.0 13.7*   ALYMPAUTO 0.8 1.0  --  0.7* 0.8   AMONOAUTO 0.5 0.3  --  0.3 0.7   AEOSAUTO 0.2 0.3  --  0.1 0.1   ABSBASO 0.0 0.0  --  0.0 0.1   NRBCMAN  --  0.0  --  0.0 0.0    176 143* 127* 153       Chemistries     Basic Panel  Recent Labs   Lab  Test 07/09/24 0903 06/23/24 2233 05/06/24  1451   * 137 134*   POTASSIUM 3.8 4.2 3.9   CHLORIDE 94* 98 94*   CO2 27 31* 28   BUN 24.8* 24.0* 21.0   CR 0.98 1.17 1.05   * 171* 247*        Calcium, Magnesium, Phosphorus  Recent Labs   Lab Test 07/09/24  0903 06/23/24  2233 05/06/24  1451 08/01/23  1146 07/25/23  1004 05/30/23  1510 05/21/23  0704 05/20/23  0706 05/19/23  0949 05/04/20  1135 04/22/20  0636   LUIS 9.3 8.9 9.3   < > 9.7   < >  --   --  9.0   < > 8.6   MAG  --   --   --   --  1.7  --  1.9 1.9 1.9   < > 1.8   PHOS  --   --   --   --   --   --   --   --  2.7  --  3.3    < > = values in this interval not displayed.        LFTs  Recent Labs   Lab Test 07/09/24 0903 06/23/24 2233 05/06/24  1451   BILITOTAL 0.5 0.4 0.6   ALKPHOS 179* 243* 100   AST 41 39 48*   ALT 73* 54 65   ALBUMIN 4.3 4.3 4.1       Immunoglobulins     Recent Labs   Lab Test 10/04/23  1439 03/17/23  1153 05/09/19  1202 02/13/19  1410   * 490* 515* 577*       Recent Labs   Lab Test 10/04/23  1439 03/17/23  1153 05/09/19  1202 02/13/19  1410   IGA 52* 50* 38* 41*       Recent Labs   Lab Test 10/04/23  1439 03/17/23  1153 05/09/19  1202 02/13/19  1410   IGM 32* 72 39* 40*         Monoclonal Protein Studies     M spike    Recent Labs   Lab Test 07/09/24  0903 10/04/23  1439 03/17/23  1153 05/09/19  1202 02/13/19  1410 08/28/18  1259   ELPM 0.0 0.0 0.0 0.0 0.0 0.0       Vardaman FLC    Recent Labs   Lab Test 07/09/24  0903 10/04/23  1439 03/17/23  1153   KFLCA 1.45 1.49 1.65       Lambda FLC    Recent Labs   Lab Test 07/09/24  0903 10/04/23  1439 03/17/23  1153   LFLCA 1.35 1.05 1.66       FLC Ratio    Recent Labs   Lab Test 07/09/24  0903 10/04/23  1439 03/17/23  1153   KLRA 1.07 1.42 0.99       The longitudinal plan of care for the diagnosis(es)/condition(s) as documented were addressed during this visit. Due to the added complexity in care, I will continue to support Erickson in the subsequent management and with ongoing  continuity of care.    ------------------------------------------------------------------------------------------------------------------------------------------------    Patient Care Team         Relationship Specialty Notifications Start End    Sherwin Mejia,  PCP - General Family Medicine  3/1/23     zreferred ot Derm    Phone: [phone] Fax: [phone] [address] [address]    Kuldeep Borrero MD MD Gastroenterology  1/11/16     Phone: [phone] Fax: [phone]         [address] [place], [place] [address] [place] [address]    Sherwin Mejia DO Assigned PCP   3/11/23     Phone: [phone] Fax: [phone]         [facility] [address]    Lisseth Arana DPM Assigned Surgical Provider   3/11/23     Phone: [phone] Fax: [phone] [address] [facility] [address]    Dylan Jose MD MD Neurology  3/15/23     Phone: [phone] Fax: [phone]         [address] [place] [address]    Kera Jernigan APRN CNP Nurse Practitioner Cardiovascular Disease  3/16/23     Phone: [phone] Fax: [phone] [address] [facility] [address] [facility] [address]    Charly Hester MD Assigned Cancer Care Provider   3/25/23     Phone: [phone] Fax: [phone]         [address] [place] [address]    Diana Bobo, RN Specialty Care Coordinator Hematology & Oncology Admissions 3/27/23     Rodrick Ferrer MD Assigned Heart and Vascular Provider   4/8/23     Phone: [phone] Fax: [phone] [address] [facility] [place] [address]    Dylan Jose MD Assigned Neuroscience Provider   9/23/23     Phone: [phone] Fax: [phone]         [address] [place] [address]    Dhruv Corea MD MD Pulmonary Disease  11/6/23     Phone: [phone] Fax: [phone]         [address] [place] [address]     Minda Smith, [facility] Assigned MTM Pharmacist   11/11/23     Phone: [phone] Fax: [phone]         [address], [place] [address] [facility] [address]    Minda Smith, [facility] Pharmacist Pharmacist  11/17/23     Phone: [phone] Fax: [phone]         [facility], [place] [id number]    Marivel Walter PA-C Physician Assistant Dermatology  12/14/23     Phone: [phone] Fax: [phone]         [address] [facility] [address],  [facility] [address]    Chantel Montgomery LICSW Lead Care Coordinator  Admissions 12/28/23     Phone: [phone]         Alecia Hanley, CHW Community Health Worker  Admissions 1/31/24     Phone: [phone]         Dhruv Corea MD Assigned Pulmonology Provider   2/23/24     Phone: [phone] Fax: [phone]         [address] [facility] [address]    Lindsey Haddad, PhD Psychologist Neuropsychology  7/10/24     Phone: [phone] Fax: [phone] [address] [facility] [address]              The longitudinal plan of care for the diagnosis(es)/condition(s) as documented were addressed during this visit. Due to the added complexity in care, I will continue to support Erickson in the subsequent management and with ongoing continuity of care.  ------------------------------------------------------------------------------------------------------------------------------------------------    Patient Care Team         Relationship Specialty Notifications Start End    Sherwin Mejia DO PCP - General Family Medicine  3/1/23     Phone: [phone] Fax: [phone] [address] [address]    Kuldeep Borrero MD MD Gastroenterology  1/11/16     Phone: [phone] Fax: [phone]         [address] [address]    Vince Henry MD Assigned PCP   2/3/19     Phone: [phone] Fax: [phone] [address] [place] [address] [place] [address]  [address]    Dylan Jose MD MD Neurology  3/15/23     Phone: [phone] Fax: [phone]         [address] [facility] [address]    Kera Jernigan APRN CNP Nurse Practitioner Cardiovascular Disease  3/16/23     Phone: [phone] Fax: [phone] [address] [facility] [address]

## 2024-09-03 ENCOUNTER — TRANSFERRED RECORDS (OUTPATIENT)
Dept: HEALTH INFORMATION MANAGEMENT | Facility: CLINIC | Age: 69
End: 2024-09-03
Payer: COMMERCIAL

## 2024-09-03 ENCOUNTER — PATIENT OUTREACH (OUTPATIENT)
Dept: CARE COORDINATION | Facility: CLINIC | Age: 69
End: 2024-09-03
Payer: COMMERCIAL

## 2024-09-03 NOTE — PROGRESS NOTES
Clinic Care Coordination Contact  Care Team Conversations    Per discussion with Chantel, lead CC SONNY, Chantel will contact pt following TCU stay to confirm that CC goals are still appropriate.    CHW plan: CHW will schedule outreach in one month.    Alecia Hanley  Community Health Worker  [facility]  [phone]

## 2024-09-04 ENCOUNTER — PATIENT OUTREACH (OUTPATIENT)
Dept: CARE COORDINATION | Facility: CLINIC | Age: 69
End: 2024-09-04
Payer: COMMERCIAL

## 2024-09-04 DIAGNOSIS — I10 BENIGN ESSENTIAL HYPERTENSION: ICD-10-CM

## 2024-09-04 RX ORDER — METOPROLOL SUCCINATE 100 MG/1
100 TABLET, EXTENDED RELEASE ORAL DAILY
Qty: 90 TABLET | Refills: 3 | Status: SHIPPED | OUTPATIENT
Start: 2024-09-04

## 2024-09-04 NOTE — PROGRESS NOTES
Clinic Care Coordination Contact  Follow Up Progress Note      Assessment: [facility] spoke with pt for follow up outreach and assessment of goals. He shares that he is at home, [facility] He plans to transition to AL in the coming months, will go to [facility] per his report. He is in the process of seeking approval for the Elderly Waiver, he shares that he already completed the in person assessment for that and is awaiting their final determination. He denies any resource needs at this time and is in agreement with care coordination ceasing regular outreaches to him. He has [facility] number saved and states he will call if any additional needs arise at this time, thanked  for the check in.     Care Gaps:    Health Maintenance Due   Topic Date Due    CONTROLLED SUBSTANCE AGREEMENT FOR CHRONIC PAIN MANAGEMENT  Never done    RSV VACCINE (1 - 1-dose 60+ series) Never done    COLORECTAL CANCER SCREENING  08/01/2021    HEPATITIS A IMMUNIZATION (2 of 2 - Risk 2-dose series) 11/16/2022    URINE DRUG SCREEN  04/17/2024    INFLUENZA VACCINE (1) 09/01/2024    COVID-19 Vaccine (7 - 2023-24 season) 09/01/2024           Care Plans      Intervention/Education provided during outreach: Graduation from          Plan:   Care Coordination program will be closed due to pt goals met. No further outreaches planned. Pt will call [facility] if needs arise.     Chantel Montgomery [facility] Ambulatory Care Management  [facility]  Phone: [phone]  E-mail: [email or web address]

## 2024-09-06 ENCOUNTER — OFFICE VISIT (OUTPATIENT)
Dept: FAMILY MEDICINE | Facility: CLINIC | Age: 69
End: 2024-09-06
Payer: COMMERCIAL

## 2024-09-06 ENCOUNTER — TELEPHONE (OUTPATIENT)
Dept: FAMILY MEDICINE | Facility: CLINIC | Age: 69
End: 2024-09-06

## 2024-09-06 ENCOUNTER — NURSE TRIAGE (OUTPATIENT)
Dept: FAMILY MEDICINE | Facility: CLINIC | Age: 69
End: 2024-09-06

## 2024-09-06 VITALS
HEART RATE: 73 BPM | BODY MASS INDEX: 39.71 KG/M2 | OXYGEN SATURATION: 97 % | SYSTOLIC BLOOD PRESSURE: 130 MMHG | RESPIRATION RATE: 19 BRPM | DIASTOLIC BLOOD PRESSURE: 74 MMHG | WEIGHT: 246 LBS | TEMPERATURE: 97.4 F

## 2024-09-06 DIAGNOSIS — N50.89 MASS OF RIGHT TESTICLE: Primary | ICD-10-CM

## 2024-09-06 DIAGNOSIS — Z53.9 DIAGNOSIS NOT YET DEFINED: Primary | ICD-10-CM

## 2024-09-06 PROCEDURE — G0179 MD RECERTIFICATION HHA PT: HCPCS | Performed by: FAMILY MEDICINE

## 2024-09-06 PROCEDURE — 99213 OFFICE O/P EST LOW 20 MIN: CPT | Performed by: PHYSICIAN ASSISTANT

## 2024-09-06 ASSESSMENT — PAIN SCALES - GENERAL: PAINLEVEL: NO PAIN (0)

## 2024-09-06 NOTE — TELEPHONE ENCOUNTER
Forms/Letter Request    Type of form/letter:   Home Health Certification and plan of care  Order number: [id number]    Certification period: 8/26/2024 to 10/24/2024    Do we have the form/letter: Yes    Who is the form from?   [facility]    Where did/will the form come from? form was faxed in    When is form/letter needed by: n/a    How would you like the form/letter returned:   Fax: [phone]    Patient Notified form requests are processed in 5-7 business days:No    Could we send this information to you in [employer] or would you prefer to receive a phone call?:   n/a    Placed in Dr. Radha Mcdermott's office bin.

## 2024-09-06 NOTE — TELEPHONE ENCOUNTER
-  Acute calculous cholecystitis  -  Radiologic findings consistent with acute cholecystitis in three imaging modalities  - US showed: Fixed stone at the gallbladder neck with gallbladder distention and gallbladder filled with sludge with positive sonographic Noble's sign, however normal gallbladder wall thickness.   - HIDA scan>No filling of the gallbladder consistent with cystic duct obstruction  - CT scan abdomen was also obtained showed distended gallbladder and pericholecystic fluid suggestive of cholecystitis  - LR. Zosyn ordered Q8 hours until 11/5.  - Surgery consulted on 10/28/2021.  Recommend NPO at midnight 10/30/21. Laparoscopic cholecystectomy performed on AM 10/31.   Patient scheduled.  Aggie LAMAS RN

## 2024-09-06 NOTE — PROGRESS NOTES
Assessment & Plan     Mass of right testicle  Will get US to further investigate  - US Testicular & Scrotum w Doppler Ltd; Future                Subjective   Erickson is a 69 year old, presenting for the following health issues:  Penis/Scrotum Problem (Hard testicle )        9/6/2024     2:47 PM   Additional Questions   Roomed by stewart mckeon   Accompanied by brittany         9/6/2024     2:47 PM   Patient Reported Additional Medications   Patient reports taking the following new medications n/a     History of Present Illness       Reason for visit:  One testicle is hard; vaccinations  Symptom onset:  1-3 days ago  Symptom intensity:  Moderate  Symptom progression:  Staying the same  Had these symptoms before:  No He is missing 4 dose(s) of medications per week.  He is not taking prescribed medications regularly due to cost of medication, remembering to take and other.                 Review of Systems  Constitutional, HEENT, cardiovascular, pulmonary, gi and gu systems are negative, except as otherwise noted.      Objective    /74 (BP Location: Left arm, Patient Position: Sitting, Cuff Size: Adult Regular)   Pulse 73   Temp 97.4  F (36.3  C) (Temporal)   Resp 19   Wt 111.6 kg (246 lb)   SpO2 97%   BMI 39.71 kg/m    Body mass index is 39.71 kg/m .  Physical Exam   GENERAL: alert and no distress  EYES: Eyes grossly normal to inspection  RESP: lungs clear to auscultation - no rales, rhonchi or wheezes  CV: regular rate and rhythm, normal S1 S2, no S3 or S4, no murmur, click or rub, no peripheral edema    (male): non tender soft mass superior to testicle.              Signed Electronically by: Rashad Rice PA-C

## 2024-09-06 NOTE — TELEPHONE ENCOUNTER
Forms faxed to [employer] fax # [phone] and copy sent to stat scan.     Ifeoma PICKERING  TC

## 2024-09-06 NOTE — TELEPHONE ENCOUNTER
S-(situation): Patient has a hard right testicle compared to left testicle.    B-(background): Patient noticed that right testicle was hard yesterday.    A-(assessment):   -No pain  -No swelling  -No rashes or changes to the skin    R-(recommendations): Patient is asking to schedule an appointment ASAP.    Penelope Santamaria RN  [facility]   Additional Information   Negative: Rash or color change of scrotum BUT no swelling or pain   Negative: Inguinal hernia previously diagnosed by a doctor (or NP/PA)   Negative: Swelling followed a genital area injury (e.g., penis, scrotum)   Negative: Pain in scrotum is main symptom   Negative: Scrotum is painful or tender to touch   Negative: Vomiting   Negative: Scrotum looks infected (e.g., draining sore, ulcer, red rash)   Negative: Patient sounds very sick or weak to the triager   Negative: Leg or ankle swelling also present   Negative: Swelling goes away when you push on it and no pain   Negative: Lumpy area discovered inside the scrotum and no pain   Negative: Scrotum swelling and no pain    Protocols used: Scrotum Swelling-A-[place]

## 2024-09-23 ENCOUNTER — TELEPHONE (OUTPATIENT)
Dept: FAMILY MEDICINE | Facility: CLINIC | Age: 69
End: 2024-09-23
Payer: COMMERCIAL

## 2024-09-23 NOTE — TELEPHONE ENCOUNTER
Home Care is calling regarding an established patient with [facility].        7/30/2024    10:48 AM 6/27/2024     9:49 AM   Home Care Information    Name/Phone Number Inna   [phone] M[place]   [phone]   Home Care [facility]     Requesting orders from: Sherwin Mejia  Provider is following patient: Yes  Is this a 60-day recertification request?  No    Orders Requested    Skilled Nursing  Request for continuation of care with no increase or decrease in frequency  Frequency:  1x/wk for 2 wks          Verbal orders given.  Home Care will send orders for provider to sign.    Aggie Smith RN

## 2024-09-23 NOTE — TELEPHONE ENCOUNTER
Vandana Floyd from [facility] home care called to request the following verbal orders from DE:    Extend skilled nursing 1x a week for 2 weeks for wound care and diabetic foot care.    Best call back #: [phone].   Confidential lea. David for detailed vm.    Thanks!  Ifeoma ALEX

## 2024-09-26 DIAGNOSIS — R60.0 BILATERAL LOWER EXTREMITY EDEMA: ICD-10-CM

## 2024-09-26 DIAGNOSIS — R33.9 URINARY RETENTION WITH INCOMPLETE BLADDER EMPTYING: ICD-10-CM

## 2024-09-26 RX ORDER — FUROSEMIDE 20 MG
TABLET ORAL
Qty: 90 TABLET | Refills: 0 | Status: SHIPPED | OUTPATIENT
Start: 2024-09-26

## 2024-09-26 RX ORDER — TAMSULOSIN HYDROCHLORIDE 0.4 MG/1
0.4 CAPSULE ORAL 2 TIMES DAILY
Qty: 180 CAPSULE | Refills: 3 | Status: SHIPPED | OUTPATIENT
Start: 2024-09-26

## 2024-09-26 NOTE — TELEPHONE ENCOUNTER
Pt notes he stopped taking LASIX around May/Amaya, then restarted. Pt notes he got a refill of LASIX from the [facility] ED a couple of months ago, explaining the gap in our records.    Pt reports he has been taking it regularly in the last few weeks. He says he is totally out at this point today    Amita DENNIS RN  .    [id number]:4-6 Days|| ||00\01||False;

## 2024-09-26 NOTE — TELEPHONE ENCOUNTER
Clinic RN: Please contact patient because patient should have run out of this medication on 5/1/24. Confirm patient is taking this medication as prescribed. Document findings and route refill encounter to provider for approval or denial.    Jose F Heath, RN, BSN, MSN  RN Lead

## 2024-10-08 ENCOUNTER — TELEPHONE (OUTPATIENT)
Dept: FAMILY MEDICINE | Facility: CLINIC | Age: 69
End: 2024-10-08
Payer: COMMERCIAL

## 2024-10-08 DIAGNOSIS — F31.76 DEPRESSED BIPOLAR I DISORDER IN FULL REMISSION (H): ICD-10-CM

## 2024-10-08 RX ORDER — BUPROPION HYDROCHLORIDE 150 MG/1
150 TABLET ORAL EVERY MORNING
Qty: 90 TABLET | Refills: 0 | Status: SHIPPED | OUTPATIENT
Start: 2024-10-08

## 2024-10-08 NOTE — TELEPHONE ENCOUNTER
Home Care is calling regarding an established patient with  [facility].        10/8/2024     4:17 PM 7/30/2024    10:48 AM   Home Care Information    Name/Phone Number [facility]- [phone] [facility]   [phone]   [facility]     Requesting orders from: Sherwin Mejia  Provider is following patient: Yes  Is this a 60-day recertification request?  No    Orders Requested    Skilled Nursing  Request for continuation of care with increase in frequency  Frequency:  1x/wk for 3 wks  Patient had a fall last Saturday and nurse does not feel comfortable discharging him too soon. Would like to continue to work with him for a few weeks and do some falls prevention and education and ongoing wound care.       Home Visit by Wound Care Specialist (WOC)   Wound care and skin integrity education      Confirmed ok to leave a detailed message with call back.  Contact information confirmed and updated as needed.    Tere Rodrigues RN

## 2024-10-09 NOTE — TELEPHONE ENCOUNTER
Called and relayed verbal approval via detailed voice mail.    Thanks!  Shawn DIALLO RN   [facility]

## 2024-10-10 ENCOUNTER — TELEPHONE (OUTPATIENT)
Dept: FAMILY MEDICINE | Facility: CLINIC | Age: 69
End: 2024-10-10
Payer: COMMERCIAL

## 2024-10-10 NOTE — TELEPHONE ENCOUNTER
Forms/Letter Request    Type of form/letter: OTHER: SN eff 10/06/2024 1wk 3wk1 1wk1        Do we have the form/letter: Yes: order    Who is the form from? Home care    Where did/will the form come from? form was faxed in    When is form/letter needed by:   asap    How would you like the form/letter returned: Fax : [phone]    Patient Notified form requests are processed in 5-7 business days:Yes    Could we send this information to you in [employer] or would you prefer to receive a phone call?:   No preference   Okay to leave a detailed message?: Yes at Other phone number:  [phone]

## 2024-10-11 NOTE — TELEPHONE ENCOUNTER
Forms faxed to [employer] fax # [phone] and copy sent to stat scan.     Ifeoma PICKERING  TC

## 2024-10-12 ENCOUNTER — MEDICAL CORRESPONDENCE (OUTPATIENT)
Dept: HEALTH INFORMATION MANAGEMENT | Facility: CLINIC | Age: 69
End: 2024-10-12
Payer: COMMERCIAL

## 2024-10-14 NOTE — TELEPHONE ENCOUNTER
"Received a call from SERGEY Mehta with Dr. Castro's (hem/onc) office.  Dr. Castro requesting pt to be seen today for right leg ulcer.  Per Janine, pt reports ulcer has been present for approximately 2 weeks, hx of lymphedema, describes a \"white crusty patch\" on part of the ulcer.      Per PCP 2/4/19 note, normal DP and PT pulses, nothing noted regarding right leg ulcer.    Pt would be most appropriate to see Vascular Medicine.  Pt is scheduled for 3:00pm in [place] with Dr. Ferrer on 2/14/19.  Pt and Dr. Castro were in agreement with this plan.  Provided address to Janine to provide to the patient.    Geraldine Martins, PRINCESSN, RN    " Expected Date Of Service: 10/14/2024 Billing Type: Third-Party Bill Bill For Surgical Tray: no

## 2024-10-17 ENCOUNTER — TELEPHONE (OUTPATIENT)
Dept: FAMILY MEDICINE | Facility: CLINIC | Age: 69
End: 2024-10-17
Payer: COMMERCIAL

## 2024-10-17 NOTE — TELEPHONE ENCOUNTER
FYI - Status Update    Who is Calling: Nurse Inna    Update: Pt has not been taking his insulin glargine and noticed change in skin integrity in one week(red and swollen) Has worsened this week. RN encouraged pt to make appt, but he has declined.    Does caller want a call/response back: Yes     Could we send this information to you in [employer] or would you prefer to receive a phone call?:   Patient would prefer a phone call   Okay to leave a detailed message?: Yes at Other phone number:  [phone]

## 2024-10-17 NOTE — TELEPHONE ENCOUNTER
Patient called back   Only VV available today at [facility]  Patient declined - requesting in person assessment  Advised urgent care is best next same day option  Patient declined at this time  States he is scheduled tomorrow  Patient will call back if new or worsening symptoms in the mean time  Divina Beverly RN

## 2024-10-17 NOTE — TELEPHONE ENCOUNTER
Left message for patient to call [facility] back  When patient calls back please transfer to an RN  Aggie LAMAS RN

## 2024-10-18 ENCOUNTER — OFFICE VISIT (OUTPATIENT)
Dept: FAMILY MEDICINE | Facility: CLINIC | Age: 69
End: 2024-10-18
Payer: COMMERCIAL

## 2024-10-18 VITALS
OXYGEN SATURATION: 91 % | HEIGHT: 67 IN | SYSTOLIC BLOOD PRESSURE: 130 MMHG | BODY MASS INDEX: 36.84 KG/M2 | RESPIRATION RATE: 16 BRPM | HEART RATE: 86 BPM | TEMPERATURE: 96.5 F | DIASTOLIC BLOOD PRESSURE: 75 MMHG | WEIGHT: 234.7 LBS

## 2024-10-18 DIAGNOSIS — Z79.4 TYPE 2 DIABETES MELLITUS WITH HYPERGLYCEMIA, WITH LONG-TERM CURRENT USE OF INSULIN (H): ICD-10-CM

## 2024-10-18 DIAGNOSIS — I87.8 VENOUS STASIS OF BOTH LOWER EXTREMITIES: ICD-10-CM

## 2024-10-18 DIAGNOSIS — I89.0 LYMPH EDEMA: Primary | ICD-10-CM

## 2024-10-18 DIAGNOSIS — E11.65 TYPE 2 DIABETES MELLITUS WITH HYPERGLYCEMIA, WITH LONG-TERM CURRENT USE OF INSULIN (H): ICD-10-CM

## 2024-10-18 DIAGNOSIS — I87.2 VENOUS STASIS DERMATITIS: ICD-10-CM

## 2024-10-18 DIAGNOSIS — E11.69 TYPE 2 DIABETES MELLITUS WITH OTHER SPECIFIED COMPLICATION, WITHOUT LONG-TERM CURRENT USE OF INSULIN (H): ICD-10-CM

## 2024-10-18 DIAGNOSIS — N18.32 STAGE 3B CHRONIC KIDNEY DISEASE (H): ICD-10-CM

## 2024-10-18 DIAGNOSIS — L97.319 VENOUS STASIS ULCER OF RIGHT ANKLE WITH VARICOSE VEINS, UNSPECIFIED ULCER STAGE (H): ICD-10-CM

## 2024-10-18 DIAGNOSIS — I83.013 VENOUS STASIS ULCER OF RIGHT ANKLE WITH VARICOSE VEINS, UNSPECIFIED ULCER STAGE (H): ICD-10-CM

## 2024-10-18 LAB
BASOPHILS # BLD AUTO: 0.1 10E3/UL (ref 0–0.2)
BASOPHILS NFR BLD AUTO: 1 %
EOSINOPHIL # BLD AUTO: 0.4 10E3/UL (ref 0–0.7)
EOSINOPHIL NFR BLD AUTO: 5 %
ERYTHROCYTE [DISTWIDTH] IN BLOOD BY AUTOMATED COUNT: 14.5 % (ref 10–15)
EST. AVERAGE GLUCOSE BLD GHB EST-MCNC: 192 MG/DL
HBA1C MFR BLD: 8.3 % (ref 0–5.6)
HCT VFR BLD AUTO: 39 % (ref 40–53)
HGB BLD-MCNC: 13.8 G/DL (ref 13.3–17.7)
IMM GRANULOCYTES # BLD: 0 10E3/UL
IMM GRANULOCYTES NFR BLD: 0 %
LYMPHOCYTES # BLD AUTO: 1 10E3/UL (ref 0.8–5.3)
LYMPHOCYTES NFR BLD AUTO: 11 %
MCH RBC QN AUTO: 32.9 PG (ref 26.5–33)
MCHC RBC AUTO-ENTMCNC: 35.4 G/DL (ref 31.5–36.5)
MCV RBC AUTO: 93 FL (ref 78–100)
MONOCYTES # BLD AUTO: 0.7 10E3/UL (ref 0–1.3)
MONOCYTES NFR BLD AUTO: 7 %
NEUTROPHILS # BLD AUTO: 7.3 10E3/UL (ref 1.6–8.3)
NEUTROPHILS NFR BLD AUTO: 76 %
PLATELET # BLD AUTO: 190 10E3/UL (ref 150–450)
RBC # BLD AUTO: 4.19 10E6/UL (ref 4.4–5.9)
WBC # BLD AUTO: 9.6 10E3/UL (ref 4–11)

## 2024-10-18 PROCEDURE — 80053 COMPREHEN METABOLIC PANEL: CPT | Performed by: FAMILY MEDICINE

## 2024-10-18 PROCEDURE — 99214 OFFICE O/P EST MOD 30 MIN: CPT | Performed by: FAMILY MEDICINE

## 2024-10-18 PROCEDURE — 36415 COLL VENOUS BLD VENIPUNCTURE: CPT | Performed by: FAMILY MEDICINE

## 2024-10-18 PROCEDURE — 83036 HEMOGLOBIN GLYCOSYLATED A1C: CPT | Performed by: FAMILY MEDICINE

## 2024-10-18 PROCEDURE — 86140 C-REACTIVE PROTEIN: CPT | Performed by: FAMILY MEDICINE

## 2024-10-18 PROCEDURE — 85025 COMPLETE CBC W/AUTO DIFF WBC: CPT | Performed by: FAMILY MEDICINE

## 2024-10-18 RX ORDER — KETOROLAC TROMETHAMINE 30 MG/ML
1 INJECTION, SOLUTION INTRAMUSCULAR; INTRAVENOUS ONCE
Qty: 1 EACH | Refills: 0 | Status: SHIPPED | OUTPATIENT
Start: 2024-10-18 | End: 2024-10-18

## 2024-10-18 RX ORDER — INSULIN GLARGINE 100 [IU]/ML
20 INJECTION, SOLUTION SUBCUTANEOUS AT BEDTIME
Qty: 75 ML | Refills: 3 | Status: SHIPPED | OUTPATIENT
Start: 2024-10-18

## 2024-10-18 RX ORDER — HYDROCHLOROTHIAZIDE 12.5 MG/1
CAPSULE ORAL
Qty: 6 EACH | Refills: 3 | Status: SHIPPED | OUTPATIENT
Start: 2024-10-18 | End: 2024-10-30

## 2024-10-18 RX ORDER — METFORMIN HYDROCHLORIDE 500 MG/1
1000 TABLET, EXTENDED RELEASE ORAL 2 TIMES DAILY WITH MEALS
Qty: 360 TABLET | Refills: 0 | Status: SHIPPED | OUTPATIENT
Start: 2024-10-18 | End: 2024-10-23

## 2024-10-18 ASSESSMENT — PAIN SCALES - GENERAL: PAINLEVEL: EXTREME PAIN (8)

## 2024-10-18 NOTE — PROGRESS NOTES
Assessment & Plan     Lymph edema  - Lymphedema Therapy  Referral; Future    Venous stasis of both lower extremities  - Lymphedema Therapy  Referral; Future    Venous stasis ulcer of right ankle with varicose veins, unspecified ulcer stage (H)  - Lymphedema Therapy  Referral; Future    Venous stasis dermatitis  - Lymphedema Therapy  Referral; Future    Type 2 diabetes mellitus with hyperglycemia, with long-term current use of insulin (H)  Uncontrolled; the patient admits to still having semaglutide at home, he was advised to restart his dose again at 0.25 mg subcutaneously every 7 days for 30 days and then increase to semaglutide 0.5 mg subcu every 7 days for 30 days.  I did refill his freestyle vera plus.  He was also  advised to restart his insulin glargine but at a lower dose of 20 units at bedtime with a plan to titrate him back on his regular dose of 54 units at bedtime.  Blood sugar monitoring was encouraged, medication adherence was strongly advised he does know to be taking his metformin more diligently although I do notice his GFR has decreased to so metformin may not be as helpful.  He does have an upcoming appointment with his PCP I believe we will recheck his BMP and consider take him off his metformin but I will leave that decision to his primary care provider.  - Hemoglobin A1c; Future  - Comprehensive metabolic panel  - CBC with platelets and differential; Future  - CRP, inflammation; Future  - Hemoglobin A1c  - CBC with platelets and differential  - CRP, inflammation  - Continuous Glucose  (FREESTYLE VERA 3 READER) MARAL; 1 each once for 1 dose. Use to read blood sugars per 's instructions.  - Continuous Glucose Sensor (FREESTYLE VERA 3 PLUS SENSOR) MISC; Use 1 sensor every 15 days. Use to read blood sugars per 's instructions.    Type 2 diabetes mellitus with other specified complication, without long-term current use of insulin  "(H)  - metFORMIN (GLUCOPHAGE XR) 500 MG 24 hr tablet; Take 2 tablets (1,000 mg) by mouth 2 times daily (with meals).  - insulin glargine 100 UNIT/ML pen; Inject 20 Units subcutaneously at bedtime. (Max 60 units)    Stage 3b chronic kidney disease (H)  New decreased change to GFR, see referral, PCP notified, appointment 10/23/2024.  - Adult Nephrology  Referral; Future          BMI  Estimated body mass index is 37.27 kg/m  as calculated from the following:    Height as of this encounter: 1.69 m (5' 6.54\").    Weight as of this encounter: 106.5 kg (234 lb 11.2 oz).             Lavinia Almendarez is a 69 year old, presenting for the following health issues:    The patient is new to provider, he has a medical history of uncontrolled type 2 diabetes, chronic pedal edema, hypertension, hyperlipidemia, coronary artery disease, amyloidosis and history of liver transplant, continuous pain management here with concerns of right lower extremity skin changes, swelling, possible erythema, and a chronic left toe venous stasis ulcer managed by his home health wound nurse.    He was quite concerned as he admits he has not been using his insulin which is 54 units at bedtime and has not been on semaglutide for months but takes his metformin occasionally.  He has a continuous blood sugar monitor but admits that he has not been checking his blood sugars either and the monitor has not been in use.     Diabetes      10/18/2024     2:30 PM   Additional Questions   Roomed by AG   Accompanied by self     History of Present Illness       Diabetes:   He presents for follow up of diabetes.   He is checking home blood glucose with a continuous glucose monitor.   He checks blood glucose before meals, after meals, before and after meals and at bedtime.  Blood glucose is sometimes over 200 and never under 70. He is aware of hypoglycemia symptoms including shakiness, dizziness, weakness and blurred vision.   He is concerned about blood " "sugar frequently over 200.   He is having excessive thirst and blurry vision.            Reason for visit:  Cellulitis? edema    He eats 0-1 servings of fruits and vegetables daily.He consumes 0 sweetened beverage(s) daily.He exercises with enough effort to increase his heart rate 9 or less minutes per day.  He exercises with enough effort to increase his heart rate 3 or less days per week. He is missing 2 dose(s) of medications per week.         Review of Systems  Constitutional, HEENT, cardiovascular, pulmonary, GI, , musculoskeletal, neuro, skin, endocrine and psych systems are negative, except as otherwise noted.      Objective    /75   Pulse 86   Temp (!) 96.5  F (35.8  C) (Temporal)   Resp 16   Ht 1.69 m (5' 6.54\")   Wt 106.5 kg (234 lb 11.2 oz)   SpO2 91%   BMI 37.27 kg/m    Body mass index is 37.27 kg/m .  Physical Exam  Cardiovascular:      Rate and Rhythm: Normal rate.   Pulmonary:      Effort: Pulmonary effort is normal.        GENERAL: alert and no distress  BLE with venous stasis dermatitis, changes due to venous stasis, no active erythema, warmth or tenderness, no signs of cellulitis, chronic left toe ulcer which was redressed with betadine, telfa and re-wrapped with kerlix.    Diabetic foot exam: dependent rubor present, dry cracking heels, and onychomycosis    Lab on 07/09/2024   Component Date Value Ref Range Status    Kappa Free Light Chains 07/09/2024 1.45  0.33 - 1.94 mg/dL Final    Lambda Free Light Chains 07/09/2024 1.35  0.57 - 2.63 mg/dL Final    Kappa /Lambda Ratio 07/09/2024 1.07  0.26 - 1.65 Final    Sodium 07/09/2024 133 (L)  135 - 145 mmol/L Final    Potassium 07/09/2024 3.8  3.4 - 5.3 mmol/L Final    Carbon Dioxide (CO2) 07/09/2024 27  22 - 29 mmol/L Final    Anion Gap 07/09/2024 12  7 - 15 mmol/L Final    Urea Nitrogen 07/09/2024 24.8 (H)  8.0 - 23.0 mg/dL Final    Creatinine 07/09/2024 0.98  0.67 - 1.17 mg/dL Final    GFR Estimate 07/09/2024 85  >60 mL/min/1.73m2 Final "    eGFR calculated using 2021 CKD-EPI equation.    Calcium 07/09/2024 9.3  8.8 - 10.2 mg/dL Final    Chloride 07/09/2024 94 (L)  98 - 107 mmol/L Final    Glucose 07/09/2024 308 (H)  70 - 99 mg/dL Final    Alkaline Phosphatase 07/09/2024 179 (H)  40 - 150 U/L Final    AST 07/09/2024 41  0 - 45 U/L Final    Reference intervals for this test were updated on 6/12/2023 to more accurately reflect our healthy population. There may be differences in the flagging of prior results with similar values performed with this method. Interpretation of those prior results can be made in the context of the updated reference intervals.    ALT 07/09/2024 73 (H)  0 - 70 U/L Final    Reference intervals for this test were updated on 6/12/2023 to more accurately reflect our healthy population. There may be differences in the flagging of prior results with similar values performed with this method. Interpretation of those prior results can be made in the context of the updated reference intervals.      Protein Total 07/09/2024 6.6  6.4 - 8.3 g/dL Final    Albumin 07/09/2024 4.3  3.5 - 5.2 g/dL Final    Bilirubin Total 07/09/2024 0.5  <=1.2 mg/dL Final    N Terminal Pro BNP Outpatient 07/09/2024 74  0 - 900 pg/mL Final    Reference range shown and results flagged as abnormal are for the outpatient, non acute settings. Establishing a baseline value for each individual patient is useful for follow-up.    Suggested inpatient cut points for confirming diagnosis of CHF in an acute setting are:  >450 pg/mL (age 18 to less than 50)  >900 pg/mL (age 50 to less than 75)  >1800 pg/mL (75 yrs and older)    An inpatient or emergency department NT-proPBNP <300 pg/mL effectively rules out acute CHF, with 99% negative predictive value.        Hemoglobin A1C 07/09/2024 9.4 (H)  0.0 - 5.6 % Final    Normal <5.7%   Prediabetes 5.7-6.4%    Diabetes 6.5% or higher     Note: Adopted from ADA consensus guidelines.    INR 07/09/2024 1.04  0.85 - 1.15 Final     aPTT 07/09/2024 29  22 - 38 Seconds Final    Total Protein Serum for ELP 07/09/2024 6.3 (L)  6.4 - 8.3 g/dL Final    Albumin 07/09/2024 4.0  3.7 - 5.1 g/dL Final    Alpha 1 07/09/2024 0.3  0.2 - 0.4 g/dL Final    Alpha 2 07/09/2024 0.7  0.5 - 0.9 g/dL Final    Beta Globulin 07/09/2024 0.7  0.6 - 1.0 g/dL Final    Gamma Globulin 07/09/2024 0.5 (L)  0.7 - 1.6 g/dL Final    Monoclonal Peak 07/09/2024 0.0  <=0.0 g/dL Final    ELP Interpretation 07/09/2024    Final                    Value:Hypogammaglobulinemia. No obvious monoclonal protein seen, recommend quantitative immunoglobulins if clinically indicated. Recommend a urine for immunofixation to rule out a light chain secreting myeloma. Pathologic significance requires clinical correlation. Micah Martinez M.D., Ph.D., Pathologist.    Signout Location if Remote 07/09/2024 BTH1   Final    WBC Count 07/09/2024 7.1  4.0 - 11.0 10e3/uL Final    RBC Count 07/09/2024 4.13 (L)  4.40 - 5.90 10e6/uL Final    Hemoglobin 07/09/2024 13.8  13.3 - 17.7 g/dL Final    Hematocrit 07/09/2024 39.1 (L)  40.0 - 53.0 % Final    MCV 07/09/2024 95  78 - 100 fL Final    MCH 07/09/2024 33.4 (H)  26.5 - 33.0 pg Final    MCHC 07/09/2024 35.3  31.5 - 36.5 g/dL Final    RDW 07/09/2024 12.9  10.0 - 15.0 % Final    Platelet Count 07/09/2024 159  150 - 450 10e3/uL Final    % Neutrophils 07/09/2024 78  % Final    % Lymphocytes 07/09/2024 12  % Final    % Monocytes 07/09/2024 7  % Final    % Eosinophils 07/09/2024 3  % Final    % Basophils 07/09/2024 1  % Final    % Immature Granulocytes 07/09/2024 1  % Final    Absolute Neutrophils 07/09/2024 5.6  1.6 - 8.3 10e3/uL Final    Absolute Lymphocytes 07/09/2024 0.8  0.8 - 5.3 10e3/uL Final    Absolute Monocytes 07/09/2024 0.5  0.0 - 1.3 10e3/uL Final    Absolute Eosinophils 07/09/2024 0.2  0.0 - 0.7 10e3/uL Final    Absolute Basophils 07/09/2024 0.0  0.0 - 0.2 10e3/uL Final    Absolute Immature Granulocytes 07/09/2024 0.0  <=0.4 10e3/uL Final            Signed Electronically by: Krys Pina MD

## 2024-10-21 LAB
ALBUMIN SERPL BCG-MCNC: 4.1 G/DL (ref 3.5–5.2)
ALP SERPL-CCNC: 118 U/L (ref 40–150)
ALT SERPL W P-5'-P-CCNC: 45 U/L (ref 0–70)
ANION GAP SERPL CALCULATED.3IONS-SCNC: 20 MMOL/L (ref 7–15)
AST SERPL W P-5'-P-CCNC: 31 U/L (ref 0–45)
BILIRUB SERPL-MCNC: 0.9 MG/DL
BUN SERPL-MCNC: 37.8 MG/DL (ref 8–23)
CALCIUM SERPL-MCNC: 9.2 MG/DL (ref 8.8–10.4)
CHLORIDE SERPL-SCNC: 95 MMOL/L (ref 98–107)
CREAT SERPL-MCNC: 2.54 MG/DL (ref 0.67–1.17)
CRP SERPL-MCNC: 14.4 MG/L
EGFRCR SERPLBLD CKD-EPI 2021: 27 ML/MIN/1.73M2
GLUCOSE SERPL-MCNC: 193 MG/DL (ref 70–99)
HCO3 SERPL-SCNC: 21 MMOL/L (ref 22–29)
POTASSIUM SERPL-SCNC: 4.2 MMOL/L (ref 3.4–5.3)
PROT SERPL-MCNC: 6.6 G/DL (ref 6.4–8.3)
SODIUM SERPL-SCNC: 136 MMOL/L (ref 135–145)

## 2024-10-23 DIAGNOSIS — N18.32 STAGE 3B CHRONIC KIDNEY DISEASE (H): ICD-10-CM

## 2024-10-23 DIAGNOSIS — Z79.4 TYPE 2 DIABETES MELLITUS WITH HYPERGLYCEMIA, WITH LONG-TERM CURRENT USE OF INSULIN (H): Primary | ICD-10-CM

## 2024-10-23 DIAGNOSIS — E11.65 TYPE 2 DIABETES MELLITUS WITH HYPERGLYCEMIA, WITH LONG-TERM CURRENT USE OF INSULIN (H): Primary | ICD-10-CM

## 2024-10-29 NOTE — TELEPHONE ENCOUNTER
Medication Question     Patient is calling because you get rx's for free from the  [facility]. Gabriel 3 sensors, received a letter from them a patient assistant renewal, was told not to send these in that Roxannalessio [facility] would take care of the renewals.  Patient is out of Gabriel 3 sensors, and Skyrizi (he did not know the mfg for this one, but is out of this as well          [facility] - [place], [place] - [address], [address]  [address], [address]  [employer] [address]  Phone: [phone] Fax: [phone]    Controlled Substance Agreement on file:   CSA -- Patient Level:    CSA: None found at the patient level.       Who prescribed the medication?: Dr. Radha Mcdermott     Do you need a refill? Yes     When did you use the medication last? Ran out a couple of weeks ago     Patient offered an appointment? No    Do you have any questions or concerns?  See above. Patient is out as of 3 weeks ago for both the sensors and Skyrizi       Could we send this information to you in [employer] or would you prefer to receive a phone call?:   Patient would prefer a phone call   Okay to leave a detailed message?: Yes at Cell number on file:    Telephone Information:   Mobile [phone]

## 2024-10-30 ENCOUNTER — OFFICE VISIT (OUTPATIENT)
Dept: FAMILY MEDICINE | Facility: CLINIC | Age: 69
End: 2024-10-30
Payer: COMMERCIAL

## 2024-10-30 VITALS
WEIGHT: 247.5 LBS | HEIGHT: 67 IN | SYSTOLIC BLOOD PRESSURE: 99 MMHG | DIASTOLIC BLOOD PRESSURE: 66 MMHG | TEMPERATURE: 97.3 F | OXYGEN SATURATION: 92 % | HEART RATE: 77 BPM | BODY MASS INDEX: 38.84 KG/M2 | RESPIRATION RATE: 14 BRPM

## 2024-10-30 DIAGNOSIS — N18.32 STAGE 3B CHRONIC KIDNEY DISEASE (H): ICD-10-CM

## 2024-10-30 DIAGNOSIS — Z79.4 TYPE 2 DIABETES MELLITUS WITH HYPERGLYCEMIA, WITH LONG-TERM CURRENT USE OF INSULIN (H): Primary | ICD-10-CM

## 2024-10-30 DIAGNOSIS — G62.9 PERIPHERAL POLYNEUROPATHY: ICD-10-CM

## 2024-10-30 DIAGNOSIS — I21.4 NSTEMI (NON-ST ELEVATED MYOCARDIAL INFARCTION) (H): ICD-10-CM

## 2024-10-30 DIAGNOSIS — L97.519 ULCER OF TOE OF RIGHT FOOT, UNSPECIFIED ULCER STAGE (H): ICD-10-CM

## 2024-10-30 DIAGNOSIS — I87.8 VENOUS STASIS OF BOTH LOWER EXTREMITIES: ICD-10-CM

## 2024-10-30 DIAGNOSIS — E11.65 TYPE 2 DIABETES MELLITUS WITH HYPERGLYCEMIA, WITH LONG-TERM CURRENT USE OF INSULIN (H): Primary | ICD-10-CM

## 2024-10-30 DIAGNOSIS — I89.0 LYMPH EDEMA: ICD-10-CM

## 2024-10-30 PROCEDURE — 99214 OFFICE O/P EST MOD 30 MIN: CPT | Performed by: FAMILY MEDICINE

## 2024-10-30 PROCEDURE — 36415 COLL VENOUS BLD VENIPUNCTURE: CPT | Performed by: FAMILY MEDICINE

## 2024-10-30 PROCEDURE — 80048 BASIC METABOLIC PNL TOTAL CA: CPT | Performed by: FAMILY MEDICINE

## 2024-10-30 RX ORDER — NITROGLYCERIN 0.4 MG/1
0.4 TABLET SUBLINGUAL EVERY 5 MIN PRN
Qty: 30 TABLET | Refills: 1 | Status: SHIPPED | OUTPATIENT
Start: 2024-10-30

## 2024-10-30 RX ORDER — PREGABALIN 100 MG/1
100 CAPSULE ORAL AT BEDTIME
Qty: 30 CAPSULE | Refills: 2 | Status: SHIPPED | OUTPATIENT
Start: 2024-10-30

## 2024-10-30 RX ORDER — MODAFINIL 200 MG/1
400 TABLET ORAL DAILY
Qty: 60 TABLET | Refills: 2 | Status: SHIPPED | OUTPATIENT
Start: 2024-10-30

## 2024-10-30 RX ORDER — HYDROCHLOROTHIAZIDE 12.5 MG/1
CAPSULE ORAL
Qty: 6 EACH | Refills: 3 | Status: SHIPPED | OUTPATIENT
Start: 2024-10-30

## 2024-10-30 RX ORDER — RISANKIZUMAB-RZAA 150 MG/ML
150 INJECTION SUBCUTANEOUS ONCE
Qty: 1 ML | Refills: 0 | Status: CANCELLED | OUTPATIENT
Start: 2024-10-30 | End: 2024-10-30

## 2024-10-30 ASSESSMENT — PAIN SCALES - GENERAL: PAINLEVEL_OUTOF10: MILD PAIN (3)

## 2024-10-30 ASSESSMENT — ANXIETY QUESTIONNAIRES
GAD7 TOTAL SCORE: 6
GAD7 TOTAL SCORE: 6
7. FEELING AFRAID AS IF SOMETHING AWFUL MIGHT HAPPEN: SEVERAL DAYS
6. BECOMING EASILY ANNOYED OR IRRITABLE: SEVERAL DAYS
4. TROUBLE RELAXING: NOT AT ALL
8. IF YOU CHECKED OFF ANY PROBLEMS, HOW DIFFICULT HAVE THESE MADE IT FOR YOU TO DO YOUR WORK, TAKE CARE OF THINGS AT HOME, OR GET ALONG WITH OTHER PEOPLE?: SOMEWHAT DIFFICULT
IF YOU CHECKED OFF ANY PROBLEMS ON THIS QUESTIONNAIRE, HOW DIFFICULT HAVE THESE PROBLEMS MADE IT FOR YOU TO DO YOUR WORK, TAKE CARE OF THINGS AT HOME, OR GET ALONG WITH OTHER PEOPLE: SOMEWHAT DIFFICULT
2. NOT BEING ABLE TO STOP OR CONTROL WORRYING: SEVERAL DAYS
GAD7 TOTAL SCORE: 6
5. BEING SO RESTLESS THAT IT IS HARD TO SIT STILL: SEVERAL DAYS
1. FEELING NERVOUS, ANXIOUS, OR ON EDGE: SEVERAL DAYS
3. WORRYING TOO MUCH ABOUT DIFFERENT THINGS: SEVERAL DAYS
7. FEELING AFRAID AS IF SOMETHING AWFUL MIGHT HAPPEN: SEVERAL DAYS

## 2024-10-30 ASSESSMENT — PATIENT HEALTH QUESTIONNAIRE - PHQ9
SUM OF ALL RESPONSES TO PHQ QUESTIONS 1-9: 6
SUM OF ALL RESPONSES TO PHQ QUESTIONS 1-9: 6
10. IF YOU CHECKED OFF ANY PROBLEMS, HOW DIFFICULT HAVE THESE PROBLEMS MADE IT FOR YOU TO DO YOUR WORK, TAKE CARE OF THINGS AT HOME, OR GET ALONG WITH OTHER PEOPLE: SOMEWHAT DIFFICULT

## 2024-10-30 NOTE — PROGRESS NOTES
Assessment & Plan     Type 2 diabetes mellitus with hyperglycemia, with long-term current use of insulin (H)  I recommended he continue Ozempic at 0.25 mg weekly which she just restarted.  At the end of this month he will switch to the 0.5 mg dose and we will try Aditya up as needed.  I also recommended that he restart the insulin glargine at 20 units.  His is not clear if he is actually taking the correct dose.  Our plan is to gradually titrate this back up to the 54 unit dose that he was previously on.  He is also on metformin, but his recent kidney function last week on 10/18/2024 was significantly impaired with a creatinine of 2.54, GFR 27.  We are rechecking labs today.  If the results are still elevated like this we will hold the metformin.  We are also helping to get him scheduled for follow-up appointment with [facility] pharmacy.  - Continuous Glucose Sensor (FREESTYLE VERA 3 PLUS SENSOR) MISC; Use 1 sensor every 15 days. Use to read blood sugars per 's instructions.    Stage 3b chronic kidney disease (H)  On 10/18/2024 his creatinine jumped up to 2.54, GFR 27.  We are rechecking a metabolic panel today.  - Basic metabolic panel  (Ca, Cl, CO2, Creat, Gluc, K, Na, BUN); Future  - Basic metabolic panel  (Ca, Cl, CO2, Creat, Gluc, K, Na, BUN)    Lymph edema  He will continue the diuretics and he has a lymphedema appointment in 2 days.    Venous stasis of both lower extremities  He is following with the lymphedema clinic and vascular.    Ulcer of toe of right foot, unspecified ulcer stage (H)  He has a podiatry appointment for this tomorrow.  He feels like the ulcer on the right great toe is steadily improving.    Peripheral polyneuropathy  He was given refills of Lyrica.  - pregabalin (LYRICA) 100 MG capsule; Take 1 capsule (100 mg) by mouth at bedtime.    NSTEMI (non-ST elevated myocardial infarction) (H)  He is requesting a refill of nitroglycerin, although he has not needed to use this in a long  time.  - nitroGLYcerin (NITROSTAT) 0.4 MG sublingual tablet; Place 1 tablet (0.4 mg) under the tongue every 5 minutes as needed for chest pain. For chest pain place 1 tablet under the tongue every 5 minutes for 3 doses. If symptoms persist 5 minutes after 1st dose call 911Roman Almendarez is a 69 year old, presenting for the following health issues:  Hospital F/U, Edema (Look at on legs, and referral), and Recheck Medication (Would like to discuss medications from manufacturing)        10/30/2024    10:08 AM   Additional Questions   Roomed by OSWALDO Back   Accompanied by n/a     HPI     Answers submitted by the patient for this visit:  Patient Health Questionnaire (Submitted on 10/30/2024)  If you checked off any problems, how difficult have these problems made it for you to do your work, take care of things at home, or get along with other people?: Somewhat difficult  PHQ9 TOTAL SCORE: 6  Patient Health Questionnaire (G7) (Submitted on 10/30/2024)  NANCY 7 TOTAL SCORE: 6      Office Visit Followup: Pt did not visit an urgent care but assumed he had/\. See 10/18/24 visit for more details    Facility[facility]  Date of visit: 10/18/2024  Reason for visit: Continuous swelling for edema  Current Status: Still having swelling in legs, would like it to be looked at today                He has a complex medical history that includes peripheral vascular disease, peripheral neuropathy, coronary artery disease, history of NSTEMI with stent placement, amyloidosis, anxiety, depression, hypersomnia, psoriasis, bipolar 1 disorder, hypertension, diabetes mellitus type 2, hyperlipidemia, MEJIAS with liver cirrhosis, plaque psoriasis, restless leg syndrome, polyneuropathy, obstructive sleep apnea not using CPA and chronic pain with pain pump containing morphine and fentanyl.      He continues to struggle with chronic lymphedema and he has lymphedema specialist who are providing some  "home care for him.  He has an open ulcerative wound on the right great toe and is seeing podiatry for this.    He recently restarted Ozempic at 0.25 mg weekly and is supposed to be on metformin and insulin glargine 20 units daily with the intention to titrate the dose back up to 54 units daily.  On 10/18/2024 his labs showed a hemoglobin A1c of 8.3%, creatinine 2.54, GFR 27 and the CBC results were stable.        Objective    BP 99/66 (BP Location: Right arm, Patient Position: Sitting, Cuff Size: Adult Large)   Pulse 77   Temp 97.3  F (36.3  C) (Temporal)   Resp 14   Ht 1.689 m (5' 6.5\")   Wt 112.3 kg (247 lb 8 oz)   SpO2 92%   BMI 39.35 kg/m    Body mass index is 39.35 kg/m .  Physical Exam   GENERAL: alert and no distress  EYES: Eyes grossly normal to inspection, PERRL and conjunctivae and sclerae normal  NECK: no adenopathy, no asymmetry, masses, or scars  RESP: lungs clear to auscultation - no rales, rhonchi or wheezes  CV: regular rate and rhythm, normal S1 S2, no S3 or S4, no murmur, click or rub  ABDOMEN: soft, nontender, no hepatosplenomegaly, no masses and bowel sounds normal  MS: no gross musculoskeletal defects noted, 1+ lower extremity edema  SKIN: There are chronic venous stasis changes present in both lower extremities from the feet into the lower leg.  There is an ulcerative open wound in the right great toe that is bandaged.  NEURO: Normal strength and tone, mentation intact and speech normal.  Sensation diminished in both feet.  PSYCH: mentation appears normal, affect normal/bright            Signed Electronically by: Sherwin Mcdermott, DO    "

## 2024-10-31 LAB
ANION GAP SERPL CALCULATED.3IONS-SCNC: 18 MMOL/L (ref 7–15)
BUN SERPL-MCNC: 24.2 MG/DL (ref 8–23)
CALCIUM SERPL-MCNC: 8.6 MG/DL (ref 8.8–10.4)
CHLORIDE SERPL-SCNC: 96 MMOL/L (ref 98–107)
CREAT SERPL-MCNC: 1.4 MG/DL (ref 0.67–1.17)
EGFRCR SERPLBLD CKD-EPI 2021: 55 ML/MIN/1.73M2
GLUCOSE SERPL-MCNC: 312 MG/DL (ref 70–99)
HCO3 SERPL-SCNC: 21 MMOL/L (ref 22–29)
POTASSIUM SERPL-SCNC: 4.3 MMOL/L (ref 3.4–5.3)
SODIUM SERPL-SCNC: 135 MMOL/L (ref 135–145)

## 2024-10-31 RX ORDER — RISANKIZUMAB-RZAA 150 MG/ML
150 INJECTION SUBCUTANEOUS ONCE
Qty: 1 ML | Refills: 0 | OUTPATIENT
Start: 2024-10-31 | End: 2024-10-31

## 2024-10-31 NOTE — TELEPHONE ENCOUNTER
I talked with Erickson about the Skyrizi prescription yesterday and recommended he reach out to his dermatologist for this since they are the ones prescribing and managing this treatment. -DE

## 2024-10-31 NOTE — TELEPHONE ENCOUNTER
Gabriel 3 sensors sent to pharmacy yesterday (10/30/2024). Skyrizi is patient reported med.     Clinic RN: Please investigate patient's chart or contact patient if the information cannot be found because the medication is listed as historical or discontinued. Confirm patient is taking this medication. Document findings and route refill encounter to provider for approval or denial.

## 2024-11-08 ENCOUNTER — PATIENT OUTREACH (OUTPATIENT)
Dept: GASTROENTEROLOGY | Facility: CLINIC | Age: 69
End: 2024-11-08
Payer: COMMERCIAL

## 2024-11-09 ASSESSMENT — ANXIETY QUESTIONNAIRES: GAD7 TOTAL SCORE: 2

## 2024-11-11 ENCOUNTER — PATIENT OUTREACH (OUTPATIENT)
Dept: CARE COORDINATION | Facility: CLINIC | Age: 69
End: 2024-11-11
Payer: COMMERCIAL

## 2024-11-11 NOTE — TELEPHONE ENCOUNTER
Please reach out to this patient and let him know that he should follow-up with [facility] as planned.  Since I just saw him for an appointment recently I would be okay with him holding off on the follow-up visit as long as he is feeling well.  We could do this as a virtual visit if he would like though.  Thank you, DE

## 2024-11-11 NOTE — PROGRESS NOTES
Clinical Product Navigator RN reviewed chart; patient on payer product coverage.  Review results:   CPN Initial Information Gathering  Referral Source: Health Plan    Obtaining further information to determine needs and next steps. Patient identified by Health Plan as a potential candidate for Primary Care Coordination due to recent admission. Per chart review, patient was admitted to [facility] 11/7 - 11/9/24 for acute colitis. Per AVS patient will follow up with [facility] for colonoscopy as outpatient, will need 2 days bowel prep. Follow up with PCP within a week. No PCP follow up scheduled yet. Patient did complete follow up with PCP 10/30/24.     RN Clinical Product Navigator called and spoke with patient. Erickson states that he is doing well. Denies any new concerns or complaints. Denies any on going pain management concerns. Reviewed AVS, patient requests assistance in scheduling OP colonoscopy. Per chart review, referral was sent to [facility], patient states that his GI provider is with [facility]. CPN offered to connect patient to [facility] for scheduling, patient states that he will wait for them to call him. CPN offered to sent patient a [employer] message with [facility] phone number to call just in case they do not call him to schedule. Patient is agreeable to this plan. CPN offered to assist patient in scheduling follow up appointment with PCP. Patient states that he was just seen by PCP a few weeks ago and request this writer to send update to PCP and see if PCP would like patient to follow up or if follow up with [facility] will best meet patient's needs. CPN will route chart with patient's inquiry.     Patient denies any on going care coordination needs at this time.       Elsa Rai RN   Clinical Product Navigator   [email or web address]   Office: [phone]

## 2024-11-12 NOTE — TELEPHONE ENCOUNTER
Left message for patient to call [facility] back  When patient calls back please transfer to an RN  Amita DENNIS RN

## 2024-11-17 ENCOUNTER — TRANSFERRED RECORDS (OUTPATIENT)
Dept: HEALTH INFORMATION MANAGEMENT | Facility: CLINIC | Age: 69
End: 2024-11-17

## 2024-11-19 ENCOUNTER — NURSE TRIAGE (OUTPATIENT)
Dept: FAMILY MEDICINE | Facility: CLINIC | Age: 69
End: 2024-11-19
Payer: COMMERCIAL

## 2024-11-19 NOTE — TELEPHONE ENCOUNTER
S-(situation): Diarrhea with fecal incontinence > 2 weeks    B-(background): 11/7 admitted for acute colitis   Stopped taking amoxicillin last week (did not finish rx- had 3-4 days left) after sx improved     A-(assessment):  Diarrhea with fecal incontinence  States stool is very mushy to wet  Can not hold anything in  - using adult diapers now  Passes stool every few hrs     Denies fever/chills, back or abdominal pain, cramping, blood in stool or strong odor, dizziness, weakness, vomiting, change in diet      R-(recommendations): per protocol patient should be seen today for moderate to severe diarrhea, with incontinence >24hrs    Pt scheduled 12/2 in [facility] but wants and needs to be seen sooner.   Routing to PCP and team to follow-up and see if primary has sooner availability    Myriam HARRISON RN  [facility]        Reason for Disposition   MODERATE diarrhea (e.g., 4-6 times / day more than normal) and present > 48 hours (2 days)    Additional Information   Negative: SEVERE abdominal pain (e.g., excruciating) and present > 1 hour   Negative: SEVERE abdominal pain and age > 60 years   Negative: Bloody, black, or tarry bowel movements  (Exception: Chronic-unchanged black-grey bowel movements and is taking iron pills or Pepto-Bismol.)   Negative: Shock suspected (e.g., cold/pale/clammy skin, too weak to stand, low BP, rapid pulse)   Negative: Difficult to awaken or acting confused (e.g., disoriented, slurred speech)   Negative: Sounds like a life-threatening emergency to the triager   Negative: Vomiting also present and worse than the diarrhea   Negative: Blood in stool and without diarrhea   Negative: SEVERE diarrhea (e.g., 7 or more times / day more than normal) and age > 60 years   Negative: Constant abdominal pain lasting > 2 hours   Negative: Drinking very little and dehydration suspected (e.g., no urine > 12 hours, very dry mouth, very lightheaded)   Negative: Patient sounds very sick or weak to the  "triager    Answer Assessment - Initial Assessment Questions  1. DIARRHEA SEVERITY: \"How bad is the diarrhea?\" \"How many more stools have you had in the past 24 hours than normal?\"     - NO DIARRHEA (SCALE 0)    - MILD (SCALE 1-3): Few loose or mushy BMs; increase of 1-3 stools over normal daily number of stools; mild increase in ostomy output.    -  MODERATE (SCALE 4-7): Increase of 4-6 stools daily over normal; moderate increase in ostomy output.    -  SEVERE (SCALE 8-10; OR \"WORST POSSIBLE\"): Increase of 7 or more stools daily over normal; moderate increase in ostomy output; incontinence.      Moderate most days - 7-8 some days   2. ONSET: \"When did the diarrhea begin?\"       >2 weeks ago  3. BM CONSISTENCY: \"How loose or watery is the diarrhea?\"       Very mushy to wet   4. VOMITING: \"Are you also vomiting?\" If Yes, ask: \"How many times in the past 24 hours?\"       no  5. ABDOMEN PAIN: \"Are you having any abdomen pain?\" If Yes, ask: \"What does it feel like?\" (e.g., crampy, dull, intermittent, constant)       no  6. ABDOMEN PAIN SEVERITY: If present, ask: \"How bad is the pain?\"  (e.g., Scale 1-10; mild, moderate, or severe)    - MILD (1-3): doesn't interfere with normal activities, abdomen soft and not tender to touch     - MODERATE (4-7): interferes with normal activities or awakens from sleep, abdomen tender to touch     - SEVERE (8-10): excruciating pain, doubled over, unable to do any normal activities        none  7. ORAL INTAKE: If vomiting, \"Have you been able to drink liquids?\" \"How much liquids have you had in the past 24 hours?\"      Eating and drinking well   8. HYDRATION: \"Any signs of dehydration?\" (e.g., dry mouth [not just dry lips], too weak to stand, dizziness, new weight loss) \"When did you last urinate?\"      Feels hydrated   9. EXPOSURE: \"Have you traveled to a foreign country recently?\" \"Have you been exposed to anyone with diarrhea?\" \"Could you have eaten any food that was spoiled?\"      " "no  10. ANTIBIOTIC USE: \"Are you taking antibiotics now or have you taken antibiotics in the past 2 months?\"        Yes  11/7 admitted for acute colitis   Stopped taking amoxicillin last week (did not finish rx- had 3-4 days left) after sx improved   11. OTHER SYMPTOMS: \"Do you have any other symptoms?\" (e.g., fever, blood in stool)        none  12. PREGNANCY: \"Is there any chance you are pregnant?\" \"When was your last menstrual period?\"        N/a    Protocols used: Diarrhea-A-OH    "

## 2024-11-19 NOTE — TELEPHONE ENCOUNTER
Patient does not believe symptoms are worsening  Prefers to schedule sooner with PCP  Scheduled tomorrow  Patient verbalized understanding, agreed with plan of care, and will call back if new or worsening symptoms  Divina Beverly RN

## 2024-11-19 NOTE — TELEPHONE ENCOUNTER
I think it should be okay for him to be seen on Friday for the symptoms as long as he feels like symptoms are not progressively getting worse.  If they are getting worse, he should get into an urgent care or go to the ER for further workup and treatment.  Thank you, DE

## 2024-11-20 ENCOUNTER — OFFICE VISIT (OUTPATIENT)
Dept: FAMILY MEDICINE | Facility: CLINIC | Age: 69
End: 2024-11-20
Payer: COMMERCIAL

## 2024-11-20 VITALS
RESPIRATION RATE: 18 BRPM | DIASTOLIC BLOOD PRESSURE: 60 MMHG | HEIGHT: 67 IN | SYSTOLIC BLOOD PRESSURE: 94 MMHG | OXYGEN SATURATION: 94 % | WEIGHT: 250.8 LBS | BODY MASS INDEX: 39.36 KG/M2 | TEMPERATURE: 97.1 F | HEART RATE: 71 BPM

## 2024-11-20 DIAGNOSIS — Z79.4 TYPE 2 DIABETES MELLITUS WITH HYPERGLYCEMIA, WITH LONG-TERM CURRENT USE OF INSULIN (H): ICD-10-CM

## 2024-11-20 DIAGNOSIS — I10 BENIGN ESSENTIAL HYPERTENSION: ICD-10-CM

## 2024-11-20 DIAGNOSIS — N18.32 STAGE 3B CHRONIC KIDNEY DISEASE (H): ICD-10-CM

## 2024-11-20 DIAGNOSIS — K52.9 ACUTE COLITIS: ICD-10-CM

## 2024-11-20 DIAGNOSIS — R15.9 INCONTINENCE OF FECES, UNSPECIFIED FECAL INCONTINENCE TYPE: Primary | ICD-10-CM

## 2024-11-20 DIAGNOSIS — E11.65 TYPE 2 DIABETES MELLITUS WITH HYPERGLYCEMIA, WITH LONG-TERM CURRENT USE OF INSULIN (H): ICD-10-CM

## 2024-11-20 DIAGNOSIS — I87.8 VENOUS STASIS OF BOTH LOWER EXTREMITIES: ICD-10-CM

## 2024-11-20 LAB
ERYTHROCYTE [DISTWIDTH] IN BLOOD BY AUTOMATED COUNT: 13.9 % (ref 10–15)
HCT VFR BLD AUTO: 34.7 % (ref 40–53)
HGB BLD-MCNC: 12.4 G/DL (ref 13.3–17.7)
MCH RBC QN AUTO: 32.8 PG (ref 26.5–33)
MCHC RBC AUTO-ENTMCNC: 35.7 G/DL (ref 31.5–36.5)
MCV RBC AUTO: 92 FL (ref 78–100)
PLATELET # BLD AUTO: 142 10E3/UL (ref 150–450)
RBC # BLD AUTO: 3.78 10E6/UL (ref 4.4–5.9)
WBC # BLD AUTO: 6.3 10E3/UL (ref 4–11)

## 2024-11-20 PROCEDURE — 36415 COLL VENOUS BLD VENIPUNCTURE: CPT | Performed by: FAMILY MEDICINE

## 2024-11-20 PROCEDURE — 85027 COMPLETE CBC AUTOMATED: CPT | Performed by: FAMILY MEDICINE

## 2024-11-20 RX ORDER — HYDROCHLOROTHIAZIDE 25 MG/1
25 TABLET ORAL DAILY
COMMUNITY
Start: 2024-11-20

## 2024-11-20 ASSESSMENT — ENCOUNTER SYMPTOMS: DIARRHEA: 1

## 2024-11-20 ASSESSMENT — PAIN SCALES - GENERAL: PAINLEVEL_OUTOF10: NO PAIN (0)

## 2024-11-20 NOTE — PROGRESS NOTES
Assessment & Plan     Incontinence of feces, unspecified fecal incontinence type  He describes having small amounts of mushy, soft stool that has been leaking onto his underwear about once a day since being hospitalized for colitis.  He finally feels like things are starting to improve.  His stool is firming up.  I recommended he continue to monitor this closely.  We are ordering a repeat colonoscopy which they recommended he have with a double prep.  He is planning to let the GI specialist know about this as well to see if they want to do any further workup.    Acute colitis  He was recently hospitalized at [facility] from 11/7/2024 - 11/9/2024 for acute colitis.  He was treated with IV Rocephin and Flagyl then switched to oral ciprofloxacin and Flagyl.  He feels like symptoms have improved.  We are going to repeat a CBC and CMP today.  - Adult GI  Referral - Procedure Only; Future  - Comprehensive metabolic panel (BMP + Alb, Alk Phos, ALT, AST, Total. Bili, TP); Future  - CBC with platelets; Future  - Comprehensive metabolic panel (BMP + Alb, Alk Phos, ALT, AST, Total. Bili, TP)  - CBC with platelets    Benign essential hypertension  His blood pressure has been running low since being out of the hospital.  He is probably dehydrated from the increased stooling.  I recommended he be sure to get adequate fluid intake and we are going to decrease the hydrochlorothiazide dose from 37.5 mg daily to 25 mg daily.  He will continue to watch his BP closely.  If it is still running low we can make further adjustments.    Stage 3b chronic kidney disease (H)  We are rechecking CMP today.    Venous stasis of both lower extremities  Continue current medications.  This issue appears to be stable.    Type 2 diabetes mellitus with hyperglycemia, with long-term current use of insulin (H)  On 10/18/24 his hemoglobin A1c was 8.3%.  I recommended he continue his current medications.  He is following with [facility] pharmacy and  he has an appointment with them tomorrow.      The longitudinal plan of care for the diagnosis(es)/condition(s) as documented were addressed during this visit. Due to the added complexity in care, I will continue to support Erickson in the subsequent management and with ongoing continuity of care.      42 minutes spent by me on the date of the encounter doing chart review, review of outside records, review of test results, interpretation of tests, patient visit, and documentation         Subjective   Erickson is a 69 year old, presenting for the following health issues:  Diarrhea        11/20/2024     2:28 PM   Additional Questions   Roomed by Vandana RINCON     History of Present Illness       Reason for visit:  Continuous  diarrhea  Symptom onset:  3-4 weeks ago  Symptoms include:  Continuous diarrhea  Symptom intensity:  Severe  Symptom progression:  Improving  Had these symptoms before:  Yes  Has tried/received treatment for these symptoms:  Yes  Previous treatment was successful:  No  What makes it worse:  No  What makes it better:  No He is missing 2 dose(s) of medications per week.  He is not taking prescribed medications regularly due to remembering to take.               He was admitted at [facility] for acute colitis from 11/7/2024 - 11/9/2024.  He presented with left lower abdominal pain.  GI was consulted and he was started on antibiotics, IV Rocephin and Flagyl.  They attempted to do a colonoscopy, however the prep was unsuccessful.  They are planning to do an outpatient colonoscopy with 2 days of prep.  He was transitioned to oral ciprofloxacin and Flagyl.     He presents to the clinic today because he has been struggling with small amounts of soft mushy stool incontinence over the past few weeks.  He has not seen blood in the stools.  He reports having issues like this a few years ago, but they have been worse since the recent colitis infection.  He reports that symptoms seem better today.  His stool has  "firmed up.  He is not complaining of abdominal pain, fevers, nausea or vomiting.  He is still feeling tired and rundown though.    He reports that blood pressure has been running low at home over the past few weeks, typically in the 90s-110s/60s-90s.    He has a complex medical history that includes peripheral vascular disease, peripheral neuropathy, coronary artery disease, history of NSTEMI with stent placement, amyloidosis, anxiety, depression, hypersomnia, psoriasis, bipolar 1 disorder, hypertension, diabetes mellitus type 2, hyperlipidemia, MEJIAS with liver cirrhosis, plaque psoriasis, restless leg syndrome, polyneuropathy, obstructive sleep apnea not using CPA and chronic pain with pain pump containing morphine and fentanyl.           Review of Systems  Constitutional, HEENT, cardiovascular, pulmonary, GI, , musculoskeletal, neuro, skin, endocrine and psych systems are negative, except as otherwise noted.      Objective    BP 94/60   Pulse 71   Temp 97.1  F (36.2  C) (Temporal)   Resp 18   Ht 1.689 m (5' 6.5\")   Wt 113.8 kg (250 lb 12.8 oz)   SpO2 94%   BMI 39.87 kg/m    Body mass index is 39.87 kg/m .  Physical Exam   GENERAL: no distress and fatigued  EYES: Eyes grossly normal to inspection, PERRL and conjunctivae and sclerae normal  HENT:  nose and mouth without ulcers or lesions  NECK: no adenopathy, no asymmetry, masses, or scars  RESP: lungs clear to auscultation - no rales, rhonchi or wheezes  CV: regular rate and rhythm, normal S1 S2, no S3 or S4, no murmur, click or rub  ABDOMEN: soft, nontender, no hepatosplenomegaly, no masses and bowel sounds normal  MS: no gross musculoskeletal defects noted, less lower extremity edema with chronic venous stasis changes present.  SKIN: no suspicious lesions or rashes  NEURO: Normal strength and tone, mentation intact and speech normal  PSYCH: mentation appears normal, affect normal/bright            Signed Electronically by: Sherwin Mcdermott, DO    "

## 2024-11-21 ENCOUNTER — VIRTUAL VISIT (OUTPATIENT)
Dept: PHARMACY | Facility: CLINIC | Age: 69
End: 2024-11-21
Payer: COMMERCIAL

## 2024-11-21 DIAGNOSIS — Z79.4 TYPE 2 DIABETES MELLITUS WITH HYPERGLYCEMIA, WITH LONG-TERM CURRENT USE OF INSULIN (H): Primary | ICD-10-CM

## 2024-11-21 DIAGNOSIS — E11.65 TYPE 2 DIABETES MELLITUS WITH HYPERGLYCEMIA, WITH LONG-TERM CURRENT USE OF INSULIN (H): Primary | ICD-10-CM

## 2024-11-21 DIAGNOSIS — G89.29 OTHER CHRONIC PAIN: ICD-10-CM

## 2024-11-21 DIAGNOSIS — I10 HYPERTENSION GOAL BP (BLOOD PRESSURE) < 140/90: ICD-10-CM

## 2024-11-21 DIAGNOSIS — E78.5 HYPERLIPIDEMIA, UNSPECIFIED HYPERLIPIDEMIA TYPE: ICD-10-CM

## 2024-11-21 DIAGNOSIS — R19.7 DIARRHEA, UNSPECIFIED TYPE: ICD-10-CM

## 2024-11-21 DIAGNOSIS — Z78.9 TAKES DIETARY SUPPLEMENTS: ICD-10-CM

## 2024-11-21 DIAGNOSIS — R53.82 CHRONIC FATIGUE, UNSPECIFIED: ICD-10-CM

## 2024-11-21 DIAGNOSIS — L40.9 PSORIASIS: ICD-10-CM

## 2024-11-21 DIAGNOSIS — N40.1 BENIGN PROSTATIC HYPERPLASIA WITH LOWER URINARY TRACT SYMPTOMS, SYMPTOM DETAILS UNSPECIFIED: ICD-10-CM

## 2024-11-21 DIAGNOSIS — F31.76 DEPRESSED BIPOLAR I DISORDER IN FULL REMISSION (H): ICD-10-CM

## 2024-11-21 LAB
ALBUMIN SERPL BCG-MCNC: 3.8 G/DL (ref 3.5–5.2)
ALP SERPL-CCNC: 101 U/L (ref 40–150)
ALT SERPL W P-5'-P-CCNC: 20 U/L (ref 0–70)
ANION GAP SERPL CALCULATED.3IONS-SCNC: 13 MMOL/L (ref 7–15)
AST SERPL W P-5'-P-CCNC: 24 U/L (ref 0–45)
BILIRUB SERPL-MCNC: 0.5 MG/DL
BUN SERPL-MCNC: 30.2 MG/DL (ref 8–23)
CALCIUM SERPL-MCNC: 9.4 MG/DL (ref 8.8–10.4)
CHLORIDE SERPL-SCNC: 107 MMOL/L (ref 98–107)
CREAT SERPL-MCNC: 1.47 MG/DL (ref 0.67–1.17)
EGFRCR SERPLBLD CKD-EPI 2021: 52 ML/MIN/1.73M2
GLUCOSE SERPL-MCNC: 148 MG/DL (ref 70–99)
HCO3 SERPL-SCNC: 21 MMOL/L (ref 22–29)
POTASSIUM SERPL-SCNC: 4 MMOL/L (ref 3.4–5.3)
PROT SERPL-MCNC: 6.1 G/DL (ref 6.4–8.3)
SODIUM SERPL-SCNC: 141 MMOL/L (ref 135–145)

## 2024-11-21 RX ORDER — MODAFINIL 100 MG/1
100 TABLET ORAL 2 TIMES DAILY
COMMUNITY

## 2024-11-21 NOTE — PATIENT INSTRUCTIONS
"Recommendations from today's MTM visit:                                                    MTM (medication therapy management) is a service provided by a clinical pharmacist designed to help you get the most of out of your medicines.   Today we reviewed what your medicines are for, how to know if they are working, that your medicines are safe and how to make your medicine regimen as easy as possible.      Restart Ozempic 0.25 mg weekly   Call [employer] support to help fixing the martha on your phone. They can be reached at [phone], 7 days a week 7am - 7pm.  Patient assistance team will reach out to you for setting up help with affording your medications. [phone] for help with Vraylar and other needs you may have  Follow-up with dermatology for refills of Skyrizi  Decrease hydrochlorothiazide to 25mg as discussed with Dr. Poole yesterday.     Follow-up: 1-2 weeks via [employer]    It was great speaking with you today.  I value your experience and would be very thankful for your time in providing feedback in our clinic survey. In the next few days, you may receive an email or text message from [employer] with a link to a survey related to your  clinical pharmacist.\"     To schedule another MTM appointment, please call the clinic directly or you may call the MTM scheduling line at [phone] or toll-free at [phone].     My Clinical Pharmacist's contact information:                                                      Please feel free to contact me with any questions or concerns you have.      Minda Smith, Pharm.D., M.B.A., BCACP  MTM Pharmacist, [facility]  Pager: [phone]  Email: [email or web address]     "

## 2024-11-21 NOTE — PROGRESS NOTES
Medication Therapy Management (MTM) Encounter    ASSESSMENT:                            Medication Adherence/Access: Needs financial assistance for certain medications    Diarrhea  Plan in place to follow-up with GI - no medication changes recommended today.     Diabetes   A1c is not at goal <8%. Discussed resuming Ozempic with patient, as it would be beneficial for managing his diabetes, and help progression fatty liver disease, potentially NORMA as well and for CV protection.   UACR elevated - check in March improved over prior year, could consider addition of SGLT2i in the future will prioritize glucose management at this time. He is on max dose losartan.   Eye and foot exams are UTD.     Hyperlipidemia   Patient is on a moderate intensity statin. His trigs are elevated, LDL not available as a result. Not discussed today, but will address recheck in the future and consideration of changing to a high-intensity statin with LDL goal <55     Hypertension   On the low end - will be reducing his hydrochlorothiazide dose as instructed by PCP.     Mental Health   Would benefit from patient assistance involvement for  Vraylar.     Pain  Stable    Psoriasis  Patient described noticeable signs/symptoms of psoriasis. Recommend following up with dermatologist for refill of Skyrizi.    Chronic Fatigue  Stable    BPH  Stable    Supplements   Stable    PLAN:                            Restart Ozempic 0.25 mg weekly   Call [employer] support to help fixing the martha on your phone. They can be reached at [phone], 7 days a week 7am - 7pm.  Patient assistance team will reach out to you for setting up help with affording your medications. [phone] for help with Vraylar and other needs you may have  Follow-up with dermatology for refills of Skyrizi  Decrease hydrochlorothiazide to 25mg as discussed with Dr. Poole yesterday.     Follow-up: 1-2 weeks via [email or web address]    SUBJECTIVE/OBJECTIVE:                          Erickson Hernández is  "a 69 year old male seen for a transitions of care visit. He was discharged from [facility] on 11/9/24 for Colitis.       Reason for visit: DIO.    Allergies/ADRs: Reviewed in chart  Past Medical History: Reviewed in chart  Tobacco: He reports that he has never smoked. He has never used smokeless tobacco.  Medication Adherence/Access: Patient assistance program(s): Provigil, Lyrica, Vraylyar     Diarrhea  Loperamide 4 mg twice daily doesn't seem to be helping  Patient has had diarrhea with fecal incontinence >2 weeks  Evaluated by PCP yesterday - see note for details. Will be seeing GI Specialist  for follow-up    Diabetes   Aspirin 81 mg daily  Insulin glargine 20 units bedtime  Ozempic 0.25 mg weekly --- Last dose 10/28/24  Denies symptoms of diabetes.  [employer] martha on phone is \"frozen\" and alerts are not working.  Does not have a regular monitor to check blood sugars.  Denies symptoms of low blood sugar.     Eye exam is up to date  Foot exam is up to date    Hyperlipidemia   Atorvastatin 20 mg daily  Patient reports no significant myalgias or other side effects.     Hypertension   Hydrochlorothiazide 37.55 mg daily-plans to decrease to 25 mg daily tomorrow  Losartan 100 mg daily  Toprol 100 mg daily  Lasix 20 mg daily  Patient reports no side effects.    Mental Health   Cariprazine 4.5 mg daily  Wellbutrin  mg daily  Patient expressed need for patient assistance with Vralyar.  Patient reports no side effects.        Pain  Pregabalin 100 mg bedtime  Duloxetine 30 mg twice daily  Oxycodone  15 mg every 6 hours as needed for pain - has been taking 3-5 times per day  Imitrex - taken one dose in two years  Pain pump managed by Nicolas   Patient reports no side effects.    Psoriasis  Skyrizi- does injections at home, twice yearly  Patient is due for 3rd injection, needs to follow up with prescriber at [facility]    Chronic Fatigue  Provigil 100mg twice daily -takes morning and evening  Does not have trouble " falling asleep at night    BPH  Flomax 0.4mg twice daily   Patient reports no side effects.    Supplements   Vitamin D 50mcg daily   Patient reports no side effects.     ----------------  Post Discharge Medication Reconciliation Status: discharge medications reconciled, continue medications without change.    I spent 28 minutes with this patient today. All changes were made via collaborative practice agreement with Dr. Mcdermott. A copy of the visit note was provided to the patient's provider(s).    A summary of these recommendations was sent via [employer].    Minda Smith, GabinoD, TOSHIA, BCACP  MTM Pharmacist, [facility]     Telemedicine Visit Details  The patient's medications can be safely assessed via a telemedicine encounter.  Type of service:  Telephone visit  Originating Location (pt. 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   Rationale: Patient forgets to take - Adherence - Adherence   Recommendation: Provide Education - Ozempic (2 MG/DOSE) 8 MG/3ML Sopn   Status: Accepted per CPA   Identified Date: 11/21/2024 Completed Date: 11/21/2024

## 2024-11-22 NOTE — PROGRESS NOTES
Care Management Follow Up Note    Length of Stay (days) 0    Patient plan of care discussed at Interdisciplinary Rounds: yes  Expected Discharge Date: 10/21/20 Home  Concerns to be Addressed:   N/A      Anticipated Discharge Disposition: Home    Anticipated Discharge Services:  OP PT  Anticipated Discharge DME:      Plan:  Per Dr. Duval request, scheduled patient with PCP and Neurology follow-up.  Appointments placed on AVS.  You are scheduled to see Mery Velasquez Nurse Practitioner on Monday, November 10th at 11:00 AM at [facility]  Oct 26, 2020  1:20 PM   Emiliana Ashford with MD Frances Pérez, RN  Frances Morales RN, BSN, OCN   Inpatient Care Coordination [address]  Office: [phone]          Consultation - Cardiology   Name: Del Ocampo 72 y.o. male I MRN: [id number]  Unit/Bed#: 4 [address] 413-01 I Date of Admission: 11/22/2024   Date of Service: 11/22/2024 I Hospital Day: 0   Inpatient consult to Cardiology  Consult performed by: Mani Flores DO  Consult ordered by: BREN Rodriguez        Physician Requesting Evaluation: Ochoa Phillips MD   Reason for Evaluation / Principal Problem: heart failure, elevated troponins    Assessment & Plan  Acute respiratory insufficiency  Not on oxygen at home, currently requiring 2 L nasal cannula  Likely secondary to volume overload, pleural effusions  Acute on chronic combined systolic and diastolic congestive heart failure (HCC)  Follows with cardiology in [place]  July 2024 TTE with EF 45-50%  Had sudden difficulty breathing yesterday, had orthopnea.  No PND  Lower extremity swelling noted  Chest x-ray with pulmonary vascular congestion, CTA showing bilateral effusions right greater than left  BNP 1954  Received 40 mg IV Lasix at 330 day of admission  -1.1 L so far  Reports his weight is up 9 pounds  Was recently on prednisone taper    Plan  Discussed with attending  Continue Lasix 40 mg IV daily for now  Start Jardiance 10 mg daily - sent for price check  Continue home Entresto 24-26, Coreg  Check intake output, standing Daily weights    Elevated troponin  11/13/2024 nuclear stress test with fixed apical defect, no reversible defects noted  Troponins on admission 56-61  No chest pain  ECG with known left bundle branch block  Most likely represents type II MI in the setting of heart failure, respiratory distress  Alcohol abuse  Reportedly no drinks for over 1 week, risk factor for heart failure  Nicotine abuse  Counseled on cessation  Chronic obstructive pulmonary disease, unspecified COPD type (HCC)  No wheezing noted on exam  Management per primary      History of Present Illness   Del Ocampo is a 72 y.o. male who presents with  shortness of breath.  Patient reports yesterday morning he woke up feeling short of breath.  He had difficulty walking around his house for the majority of the day.  When he went to go to bed he noticed he was having a worse time breathing when laying down so he came to the hospital.  He also notes that his weight is up 9 pounds from what he typically weighs.  Reports he has noticed some swelling in his hands feet and legs.  Denies any paroxysmal nocturnal dyspnea, chest pain.  Denies any significant changes in diet, says he does not eat a lot of salt.  He has been told recently to drink less water.  He had a nuclear stress test done 9 days prior to admission which did not show any reversible defects.  In the ED was requiring up to 4 L nasal cannula which is now weaned down to 2 L.  Imaging with evidence of volume overload including vascular congestion, pleural effusions.  Troponins mildly elevated at 56/61, BNP markedly elevated at 1955.  Was given 40 mg IV Lasix in the ED and admitted.    Review of Systems   Respiratory:  Positive for shortness of breath.    Cardiovascular:  Positive for leg swelling. Negative for chest pain.   Gastrointestinal:  Positive for abdominal distention and vomiting.   Genitourinary:  Negative for difficulty urinating.   All other systems reviewed and are negative.    I have reviewed the patient's PMH, PSH, Social History, Family History, Meds, and Allergies    Objective :  Temp:  [97.9 °F (36.6 °C)-99.3 °F (37.4 °C)] 97.9 °F (36.6 °C)  HR:  [] 107  BP: (123-160)/(61-96) 123/72  Resp:  [18-33] 18  SpO2:  [88 %-99 %] 89 %  O2 Device: Nasal cannula  Nasal Cannula O2 Flow Rate (L/min):  [2 L/min-4 L/min] 2 L/min  Orthostatic Blood Pressures      Flowsheet Row Most Recent Value   Blood Pressure 123/72 filed at 11/22/2024 0845   Patient Position - Orthostatic VS Lying filed at 11/22/2024 0430          First Weight: Weight - Scale: 72.3 kg (159 lb 6.4 oz) (11/22/24 4039)  Vitals:     "11/22/24 0509 11/22/24 0600   Weight: 72.3 kg (159 lb 6.4 oz) 72.3 kg (159 lb 6.4 oz)       Physical Exam  Constitutional:       General: He is not in acute distress.     Appearance: He is normal weight. He is not ill-appearing.   HENT:      Mouth/Throat:      Mouth: Mucous membranes are moist.      Pharynx: Oropharynx is clear.   Neck:      Comments: Mild JVD noted  Cardiovascular:      Rate and Rhythm: Regular rhythm. Tachycardia present.      Heart sounds: No murmur heard.  Pulmonary:      Effort: Pulmonary effort is normal.      Breath sounds: No wheezing.      Comments: Diminished breath sounds at bases bilaterally  Abdominal:      General: There is distension.      Tenderness: There is no abdominal tenderness.   Musculoskeletal:      Right lower leg: Edema (+1) present.      Left lower leg: Edema (+1) present.   Skin:     General: Skin is warm and dry.   Neurological:      Mental Status: He is alert. Mental status is at baseline.           Lab Results: I have reviewed the following results:  Results from last 7 days   Lab Units 11/22/24  0242   WBC Thousand/uL 10.43*   HEMOGLOBIN g/dL 12.9   HEMATOCRIT % 39.5   PLATELETS Thousands/uL 215     Results from last 7 days   Lab Units 11/22/24  0733 11/22/24  0242   POTASSIUM mmol/L 3.7 3.6   CHLORIDE mmol/L 101 105   CO2 mmol/L 23 28   BUN mg/dL 5 5   CREATININE mg/dL 0.66 0.61   CALCIUM mg/dL 8.1* 8.5         No results found for: \"HGBA1C\"  No results found for: \"CKTOTAL\", \"CKMB\", \"CKMBINDEX\", \"TROPONINI\"          VTE Prophylaxis: VTE covered by:  enoxaparin, Subcutaneous, 40 mg at 11/22/24 0801         "

## 2024-11-26 ASSESSMENT — ANXIETY QUESTIONNAIRES: GAD7 TOTAL SCORE: 3

## 2024-11-29 ENCOUNTER — TRANSFERRED RECORDS (OUTPATIENT)
Dept: HEALTH INFORMATION MANAGEMENT | Facility: CLINIC | Age: 69
End: 2024-11-29

## 2024-11-29 NOTE — CONSULTS
Consult Date:  05/11/2019      NEPHROLOGY CONSULTATION      REQUESTING PHYSICIAN:  Dr. Dru De Guzman      PRIMARY CARE PHYSICIAN:  Dr. Henry with [facility].      HISTORY OF PRESENT ILLNESS:  Parmjit Hernández is a 63-year-old man whom we were asked to see to assist in the evaluation and management of acute renal failure.  The patient was admitted yesterday to the observation unit after having been directed to the hospital for evaluation of abnormal labs obtained earlier in the week.  The patient is currently changing his local Hematology care from [facility] to [facility].  His primary care is through the [facility] system.  He follows with Hematology at the [facility] for a diagnosis of AL amyloidosis a diagnosis made several years ago.  The patient's amyloidosis appears to be manifested mainly by abdominal and soft tissue involvement.  There has been no evidence suggesting renal or cardiac involvement.  The patient received an autologous stem cell transplant approximately 1.5 years ago.  He has not been on any immunosuppressive therapy recently.  He saw his hematologist at the [facility], Dr. Trinh, not long ago and is felt to be doing well.  His baseline renal function seems to be normal.  Creatinine is usually less than 1.0.  UA is normal in the past.  He has never seen a nephrologist before.      The patient has a history of obesity, hypertension, type 2 diabetes and hyperlipidemia.  He also has problems with peripheral neuropathy, presumably related to his amyloidosis.  His medication list includes lisinopril and hydrochlorothiazide for blood pressure control, and glipizide and metformin for diabetic management.  He has had a record of satisfactory blood glucoses and hemoglobin A1c values; his blood pressure control has apparently been good.  He mentions that because of a history of chronic abdominal pain problems which are now resolved, he does not habitually maintain good fluid intake.   He feels that recently his intake has been poor.  Labs obtained earlier this week in anticipation of a visit with his new hematologist, Dr. Castro, raised concern when they were reviewed with him 2 days ago.  BUN and creatinine were 50 and 1.89, while electrolytes and other blood chemistries looked fine.  A repeat BUN and creatinine after admission here yesterday were 52 and 2.18.  Once again, electrolytes were normal, although potassium was higher than before 5.1. Noteworthy was a hemoglobin A1c of 4.7%.  It had been 5.3% when last checked in December.  The patient's blood glucoses since admission have shown borderline low values as well 65 not long after his admission last evening and in the 70s through the night up to 107 this morning.  The patient's antihypertensives and diabetic medications have been on hold.  He has been on an infusion of normal saline at 100 mL per hour.  He has been taking a regular diet in good amounts and generally feels well.  His blood pressure on admission was 92/57 and has slowly risen since then to a current value of 125/80.  Other vital signs have been normal.      The patient had an earlier bout of a transient acute renal failure for which he was briefly hospitalized in December of last year at [facility][note 2].  His symptoms at that time were inability to void.  He feels that voiding is still not satisfactory.  Creatinine gordon to a level of about 1.6 at that time and gradually improved.  By late January of this year BUN and creatinine were 26 and 0.9, and similar values were obtained in February.  The patient also had a transiently elevated creatinine last summer of 1.54.  The circumstances of that value are not clear.  Creatinine was 0.9 whenb rechecked the following month.  Quantitation of albuminuria about a year ago showed a normal value of 9.45 mg/g creatinine.  The patient's most recent abdominal imaging was a CT scan of the abdomen and pelvis with contrast in 09/2018.   He complained of right lower quadrant abdominal pain at that time. Kidneys were normal with no sign of obstructive uropathy, small benign cysts were noted in the left kidney that were unchanged.  Otherwise, there were no anatomic findings of concern.  The bladder was empty. The prostate was felt to be normal in size, PSA earlier this year was fine, 0.19.      Mr. Hernández's primary care with Dr. Henry at [facility] is a fairly new primary care relationship.  It appears she has been providing him care for about the last 6 months.      PHYSICAL EXAMINATION:   GENERAL:  Mr. Hernández is an alert, calm, obese man in no distress.   VITAL SIGNS:  Weight is 113.5 kilograms and BMI around 39.  Current vital signs include a normal temperature, pulse regular at 80, blood pressure 125/80, respirations 18 and unlabored, room air saturation 97%.   SKIN:  Shows satisfactory color and turgor with no suspicious skin lesions.  There is no lymphadenopathy palpable.   HEENT AND NECK:  Unremarkable.  Mucous membranes are moist and pink.   NECK:  There is no JVD.   LUNGS:  Clear.   HEART:  Normal, without murmur, rub or gallop.   ABDOMEN:  Soft without tenderness, masses or organomegaly.   EXTREMITIES:  Warm.  There is a trace of edema bilaterally that the patient believes is a residual from what was previously more severe lymphedema of several years ago. Peripheral pulses are strong and symmetric.  There are no joint findings of concern.   NEUROLOGIC:  Seems negative.      Mr. Franciss chemistries earlier today show a much improved BUN and creatinine, 40 and 1.46.  Electrolytes remain normal.  CBC has been stable since admission with hemoglobin about 11; there is a slightly low white blood cell count and platelets, which is similar to before.  Review of the patient's old records also includes an echocardiogram from about 5 months ago that shows excellent left ventricular systolic function with ejection fraction greater than 70%.  There is  mild concentric LVH.  Right ventricular systolic function is also normal.  Mention is made of a prior study of 2018 showing similar results.      The patient's urine output here has been good, and his UA this admission is again normal. There are numerous hyaline casts in the sediment, however, that suggest a degree of urine concentration.      ASSESSMENT/PLAN:  I suspect that the patient's improving renal function is on the basis correction of a prerenal abnormality, likely volume depletion.  I doubt that there are other factors involved in the patient's elevated creatinine at this time, and I feel confident that baseline renal function is probably normal.  I think that he is probably being overtreated for his hypertension and his diabetes.  I have recommended a bilateral renal ultrasound to be certain that there is no sign of obstructive uropathy, but I believe this is unlikely.  I think that the patient might be safely discharged as soon as later today if another creatinine check shows an even lower result and other chemistries look satisfactory.  I will review the results of the ultrasound with him this afternoon.      Thank you for the opportunity to assist in Mr. Prescott's care.  Our group will follow as appropriate during the remainder of his hospital stay.         SARA BERNAL MD             D: 2019   T: 2019   MT: AS      Name:     JULIO PRESCOTT   MRN:      [id number]        Account:       [id number]   :      1955           Consult Date:  2019      Document: [id number]       cc: Vince Henry MD      Dental caries

## 2024-12-05 ENCOUNTER — PATIENT OUTREACH (OUTPATIENT)
Dept: CARE COORDINATION | Facility: CLINIC | Age: 69
End: 2024-12-05
Payer: COMMERCIAL

## 2024-12-05 ENCOUNTER — TELEPHONE (OUTPATIENT)
Dept: FAMILY MEDICINE | Facility: CLINIC | Age: 69
End: 2024-12-05
Payer: COMMERCIAL

## 2024-12-05 NOTE — TELEPHONE ENCOUNTER
It's on his med list so we should be good. I just wondered since it didn't look like there was much activity. That's when I looked further and saw 'historical'    I'll get the Ozempic application done too.    Thanks Petty!!    Barb

## 2024-12-05 NOTE — TELEPHONE ENCOUNTER
I am working on [employer] 2025  assistance re-enrollment applications.    Ozempic has been listed as 'Historical'    Do you want me to complete the application for Ozempic with [employer]?    I am working on Vraylar and Basaglar also.    Barb Huerta  Prescription Assistance Supervisor  Pharmacy Assistance   Pool: RxAssist

## 2024-12-05 NOTE — PROGRESS NOTES
Clinical Product Navigator RN reviewed chart; patient on payer product coverage.  Review results:   CPN Initial Information Gathering  Referral Source: Health Plan    Obtaining further information to determine needs and next steps. Patient identified by Health Plan as a potential candidate for Primary Care Coordination due to recent hospitalization. Per chart review, patient was admitted to [facility] 11/29 - 12/4/24 for E. coli gastroenteritis. Patient did undergo colonoscopy during the prior hospitalization though he had a very poor prep likely relating to intrathecal pain pump (managed by [facility] provider) use. He was supposed to arrange for outpatient colonoscopy though this is more on a screening basis. CT A/P shows unusual gastric wall thickening, questionable nidus of intramural air in dependent portions of the stomach near the GE junction, as well as rectal wall thickening with associated presacral fluid and fat stranding.     Home Health Care attempted to initiate services today, December 5, 2024 however they were unable to reach the patient.     Patient is still in need of follow up with [facility] and Outpatient colonoscopy for surveillance as previously recommended, after resolution of E. coli.     Clinic Care Coordination Contact  [facility]/Voicemail    Clinical Data: Care Coordinator Outreach    Outreach Documentation Number of Outreach Attempt   12/5/2024   2:11 PM 1       Left message on patient's voicemail with call back information and requested return call.      Plan: RN Clinical Product Navigator will try to reach patient again in 1-2 business days.    Elsa Rai RN   Clinical Product Navigator   [email or web address]   Office: [phone]

## 2024-12-10 ENCOUNTER — TRANSFERRED RECORDS (OUTPATIENT)
Dept: HEALTH INFORMATION MANAGEMENT | Facility: CLINIC | Age: 69
End: 2024-12-10

## 2024-12-12 ENCOUNTER — PATIENT OUTREACH (OUTPATIENT)
Dept: CARE COORDINATION | Facility: CLINIC | Age: 69
End: 2024-12-12
Payer: COMMERCIAL

## 2024-12-12 NOTE — LETTER
[facility]   To:     Please give to facility    From:   Lucrecia Guthrie MA  Care Transitions Assistant    Ambulatory Care Management team  [facility]   Phone: [phone]  Patient Name:  Parmjit Hernández YOB: 1955   Admit date: 12/11/2024      *Information Needed:  Please contact me when the patient will discharge (or if they will move to long term care)- include the discharge date, disposition, and main diagnosis   If the patient is discharged with home care services, please provide the name of the agency.       Thank you!

## 2024-12-12 NOTE — PROGRESS NOTES
[facility]  TCU Monitoring    Clinical Data: Patient was identified from [facility] payor report and has transitioned to TCU/ARU for short term rehabilitation:    Facility Name: [facility]   Transition Communication:  Notified facility of Ambulatory Care Management TCU monitoring process via Epic fax.    Assessment/Plan: Patient will be monitored by Ambulatory Care Management to identify patient's discharge plans/need. Care  will review chart and outreach to facility staff every 3-4 weeks and as needed.     Lucrecia Guthrie  [facility]

## 2024-12-23 ENCOUNTER — TELEPHONE (OUTPATIENT)
Dept: FAMILY MEDICINE | Facility: CLINIC | Age: 69
End: 2024-12-23
Payer: COMMERCIAL

## 2024-12-23 NOTE — TELEPHONE ENCOUNTER
Reason for Call:  Appointment Request    Patient requesting this type of appt:  Mr Hernández    Requested provider: Sherwin Mejia    Reason patient unable to be scheduled: Not within requested timeframe    When does patient want to be seen/preferred time: 1-2 weeks    Comments: Pt has a surgical procedure to replace batteries in pain pump on 1/10/25.  He needs a pre op appointment before then and would like to see his PCP    Could we send this information to you in [facility] or would you prefer to receive a phone call?:   Patient would prefer a phone call   Okay to leave a detailed message?: Yes at Home number on file [phone] (home)    Call taken on 12/23/2024 at 11:24 AM by Fabian Corona

## 2024-12-23 NOTE — TELEPHONE ENCOUNTER
Patient scheduled  for a Preop Px 1/6/25  [facility] Unit Coordinator

## 2024-12-30 ENCOUNTER — PATIENT OUTREACH (OUTPATIENT)
Dept: CARE COORDINATION | Facility: CLINIC | Age: 69
End: 2024-12-30
Payer: COMMERCIAL

## 2024-12-30 ENCOUNTER — TELEPHONE (OUTPATIENT)
Dept: FAMILY MEDICINE | Facility: CLINIC | Age: 69
End: 2024-12-30
Payer: COMMERCIAL

## 2024-12-30 ENCOUNTER — MEDICAL CORRESPONDENCE (OUTPATIENT)
Dept: HEALTH INFORMATION MANAGEMENT | Facility: CLINIC | Age: 69
End: 2024-12-30
Payer: COMMERCIAL

## 2024-12-30 NOTE — TELEPHONE ENCOUNTER
Forms/Letter Request    Type of form/letter: OTHER: abby zamora a representative of dr tuttle approving delay soc rn,pt,ot to 12/9       Do we have the form/letter: Yes:  order    Who is the form from? Home care    Where did/will the form come from? form was faxed in    When is form/letter needed by: asap FWD to parag     How would you like the form/letter returned: Fax : [phone]    Patient Notified form requests are processed in 5-7 business days:N/A    Could we send this information to you in Rep or would you prefer to receive a phone call?:   No preference   Okay to leave a detailed message?: N/A at Cell number on file:    Telephone Information:   Mobile     or Other phone number: [phone]

## 2024-12-30 NOTE — TELEPHONE ENCOUNTER
Forms faxed to [employer] fax # [phone] and copy sent to stat scan.     Ifeoma PICKERING  TC

## 2024-12-30 NOTE — PROGRESS NOTES
[facility]  TCU Monitoring    Background: Patient previously identified from [facility] payor report and had transitioned to TCU/ARU for short term rehabilitation.     Facility Name: [facility]    CTA performed outreach to facility to inquire about anticipated discharge plan.  Discharge plan remains unknown.    Assessment/Plan:  CTA will review chart and outreach to facility staff every 3-4 weeks and as needed.     Lucrecia Guthrie  [facility] Resource [facility]

## 2025-01-06 ENCOUNTER — OFFICE VISIT (OUTPATIENT)
Dept: FAMILY MEDICINE | Facility: CLINIC | Age: 70
End: 2025-01-06
Payer: COMMERCIAL

## 2025-01-06 VITALS
OXYGEN SATURATION: 92 % | BODY MASS INDEX: 38.3 KG/M2 | TEMPERATURE: 96.8 F | SYSTOLIC BLOOD PRESSURE: 95 MMHG | HEART RATE: 85 BPM | RESPIRATION RATE: 16 BRPM | HEIGHT: 67 IN | WEIGHT: 244 LBS | DIASTOLIC BLOOD PRESSURE: 64 MMHG

## 2025-01-06 DIAGNOSIS — Z79.4 TYPE 2 DIABETES MELLITUS WITH HYPERGLYCEMIA, WITH LONG-TERM CURRENT USE OF INSULIN (H): ICD-10-CM

## 2025-01-06 DIAGNOSIS — F31.76 DEPRESSED BIPOLAR I DISORDER IN FULL REMISSION: ICD-10-CM

## 2025-01-06 DIAGNOSIS — E11.65 TYPE 2 DIABETES MELLITUS WITH HYPERGLYCEMIA, WITH LONG-TERM CURRENT USE OF INSULIN (H): ICD-10-CM

## 2025-01-06 DIAGNOSIS — I10 BENIGN ESSENTIAL HYPERTENSION: ICD-10-CM

## 2025-01-06 DIAGNOSIS — Z01.818 PREOP GENERAL PHYSICAL EXAM: Primary | ICD-10-CM

## 2025-01-06 DIAGNOSIS — N18.32 STAGE 3B CHRONIC KIDNEY DISEASE (H): ICD-10-CM

## 2025-01-06 DIAGNOSIS — M48.061 SPINAL STENOSIS OF LUMBAR REGION, UNSPECIFIED WHETHER NEUROGENIC CLAUDICATION PRESENT: ICD-10-CM

## 2025-01-06 DIAGNOSIS — G89.4 CHRONIC PAIN SYNDROME: ICD-10-CM

## 2025-01-06 DIAGNOSIS — I73.9 PERIPHERAL VASCULAR DISEASE: ICD-10-CM

## 2025-01-06 DIAGNOSIS — Z94.84 HISTORY OF PERIPHERAL STEM CELL TRANSPLANT (H): ICD-10-CM

## 2025-01-06 DIAGNOSIS — F11.20 CONTINUOUS OPIOID DEPENDENCE (H): ICD-10-CM

## 2025-01-06 DIAGNOSIS — E66.01 MORBID OBESITY (H): ICD-10-CM

## 2025-01-06 PROBLEM — D69.6 THROMBOCYTOPENIA, UNSPECIFIED: Status: RESOLVED | Noted: 2020-08-03 | Resolved: 2025-01-06

## 2025-01-06 PROBLEM — R11.2 NAUSEA AND VOMITING, UNSPECIFIED VOMITING TYPE: Status: RESOLVED | Noted: 2023-05-30 | Resolved: 2025-01-06

## 2025-01-06 PROBLEM — K74.69 OTHER CIRRHOSIS OF LIVER (H): Status: RESOLVED | Noted: 2020-04-15 | Resolved: 2025-01-06

## 2025-01-06 PROBLEM — I21.4 NSTEMI (NON-ST ELEVATED MYOCARDIAL INFARCTION) (H): Status: RESOLVED | Noted: 2018-04-07 | Resolved: 2025-01-06

## 2025-01-06 PROCEDURE — 99215 OFFICE O/P EST HI 40 MIN: CPT | Performed by: FAMILY MEDICINE

## 2025-01-06 RX ORDER — METFORMIN HYDROCHLORIDE 500 MG/1
1000 TABLET, EXTENDED RELEASE ORAL 2 TIMES DAILY WITH MEALS
COMMUNITY
Start: 2024-12-04

## 2025-01-06 ASSESSMENT — ANXIETY QUESTIONNAIRES
3. WORRYING TOO MUCH ABOUT DIFFERENT THINGS: NOT AT ALL
1. FEELING NERVOUS, ANXIOUS, OR ON EDGE: NOT AT ALL
GAD7 TOTAL SCORE: 0
7. FEELING AFRAID AS IF SOMETHING AWFUL MIGHT HAPPEN: NOT AT ALL
7. FEELING AFRAID AS IF SOMETHING AWFUL MIGHT HAPPEN: NOT AT ALL
6. BECOMING EASILY ANNOYED OR IRRITABLE: NOT AT ALL
8. IF YOU CHECKED OFF ANY PROBLEMS, HOW DIFFICULT HAVE THESE MADE IT FOR YOU TO DO YOUR WORK, TAKE CARE OF THINGS AT HOME, OR GET ALONG WITH OTHER PEOPLE?: NOT DIFFICULT AT ALL
4. TROUBLE RELAXING: NOT AT ALL
GAD7 TOTAL SCORE: 0
2. NOT BEING ABLE TO STOP OR CONTROL WORRYING: NOT AT ALL
IF YOU CHECKED OFF ANY PROBLEMS ON THIS QUESTIONNAIRE, HOW DIFFICULT HAVE THESE PROBLEMS MADE IT FOR YOU TO DO YOUR WORK, TAKE CARE OF THINGS AT HOME, OR GET ALONG WITH OTHER PEOPLE: NOT DIFFICULT AT ALL
GAD7 TOTAL SCORE: 0
5. BEING SO RESTLESS THAT IT IS HARD TO SIT STILL: NOT AT ALL

## 2025-01-06 ASSESSMENT — PATIENT HEALTH QUESTIONNAIRE - PHQ9
SUM OF ALL RESPONSES TO PHQ QUESTIONS 1-9: 5
SUM OF ALL RESPONSES TO PHQ QUESTIONS 1-9: 5
10. IF YOU CHECKED OFF ANY PROBLEMS, HOW DIFFICULT HAVE THESE PROBLEMS MADE IT FOR YOU TO DO YOUR WORK, TAKE CARE OF THINGS AT HOME, OR GET ALONG WITH OTHER PEOPLE: SOMEWHAT DIFFICULT

## 2025-01-06 ASSESSMENT — PAIN SCALES - GENERAL: PAINLEVEL_OUTOF10: MODERATE PAIN (5)

## 2025-01-06 NOTE — PATIENT INSTRUCTIONS
How to Take Your Medication Before Surgery  Preoperative Medication Instructions   Antiplatelet or Anticoagulation Medication Instructions   - aspirin: Bleeding risk is low for this procedure and daily aspirin may be continued without modification.     Additional Medication Instructions  Take all scheduled medications on the day of surgery EXCEPT for modifications listed below:  -Do NOT Take modafinil on morning of surgery.   - Herbal medications and vitamins: DO NOT TAKE 14 days prior to surgery.   - ACE/ARB/ARNI (lisinopril, enalapril, losartan, valsartan, olmesartan, sacubritril/valsartan) : DO NOT TAKE on day of surgery (minimum 11 hours for general anesthesia).   - Beta Blockers (atenolol, metoprolol, propranolol) : Continue taking on the day of surgery.   - Diuretics (furosemide, hydrochlorothiazide, chlorothalidone): DO NOT TAKE on the day of surgery.   - Statins (atorvastatin, simvastatin, pravastatin) : Continue taking on the day of surgery.    - Long acting insulin (e.g. glargine, detemir): Take 80% of the usual evening dose before surgery (16 Units).     - metformin: DO NOT TAKE day of surgery.         Patient Education   Preparing for Your Surgery  For Adults  Getting started  In most cases, a nurse will call to review your health history and instructions. They will give you an arrival time based on your scheduled surgery time. Please be ready to share:  Your doctor's clinic name and phone number  Your medical, surgical, and anesthesia history  A list of allergies and sensitivities  A list of medicines, including herbal treatments and over-the-counter drugs  Whether the patient has a legal guardian (ask how to send us the papers in advance)  Note: You may not receive a call if you were seen at our PAC (Preoperative Assessment Center).  Please tell us if you're pregnant--or if there's any chance you might be pregnant. Some surgeries may injure a fetus (unborn baby), so they require a pregnancy test.  Surgeries that are safe for a fetus don't always need a test, and you can choose whether to have one.   Preparing for surgery  Within 10 to 30 days of surgery: Have a pre-op exam (sometimes called an H&P, or History and Physical). This can be done at a clinic or pre-operative center.  If you're having a , you may not need this exam. Talk to your care team.  At your pre-op exam, talk to your care team about all medicines you take. (This includes CBD oil and any drugs, such as THC, marijuana, and other forms of cannabis.) If you need to stop any medicine before surgery, ask when to start taking it again.  This is for your safety. Many medicines and drugs can make you bleed too much during surgery. Some change how well surgery (anesthesia) drugs work.  Call your insurance company to let them know you're having surgery. (If you don't have insurance, call [phone].)  Call your clinic if there's any change in your health. This includes a scrape or scratch near the surgery site, or any signs of a cold (sore throat, runny nose, cough, rash, fever).  Eating and drinking guidelines  For your safety: Unless your surgeon tells you otherwise, follow the guidelines below.  Eat and drink as normal until 8 hours before you arrive for surgery. After that, no food or milk. You can spit out gum when you arrive.  Drink clear liquids until 2 hours before you arrive. These are liquids you can see through, like water, Gatorade, and Propel Water. They also include plain black coffee and tea (no cream or milk).  No alcohol for 24 hours before you arrive. The night before surgery, stop any drinks that contain THC.  If your care team tells you to take medicine on the morning of surgery, it's okay to take it with a sip of water. No other medicines or drugs are allowed (including CBD oil)--follow your care team's instructions.  If you have questions the day of surgery, call your hospital or surgery center.   Preventing  infection  Shower or bathe the night before and the morning of surgery. Follow the instructions your clinic gave you. (If no instructions, use regular soap.)  Don't shave or clip hair near your surgery site. We'll remove the hair if needed.  Don't smoke or vape the morning of surgery. No chewing tobacco for 6 hours before you arrive. A nicotine patch is okay. You may spit out nicotine gum when you arrive.  For some surgeries, the surgeon will tell you to fully quit smoking and nicotine.  We will make every effort to keep you safe from infection. We will:  Clean our hands often with soap and water (or an alcohol-based hand rub).  Clean the skin at your surgery site with a special soap that kills germs.  Give you a special gown to keep you warm. (Cold raises the risk of infection.)  Wear hair covers, masks, gowns, and gloves during surgery.  Give antibiotic medicine, if prescribed. Not all surgeries need this medicine.  What to bring on the day of surgery  Photo ID and insurance card  Copy of your health care directive, if you have one  Glasses and hearing aids (bring cases)  You can't wear contacts during surgery  Inhaler and eye drops, if you use them (tell us about these when you arrive)  CPAP machine or breathing device, if you use them  A few personal items, if spending the night  If you have . . .  A pacemaker, ICD (cardiac defibrillator), or other implant: Bring the ID card.  An implanted stimulator: Bring the remote control.  A legal guardian: Bring a copy of the certified (court-stamped) guardianship papers.  Please remove any jewelry, including body piercings. Leave jewelry and other valuables at home.  If you're going home the day of surgery  You must have a responsible adult drive you home. They should stay with you overnight as well.  If you don't have someone to stay with you, and you aren't safe to go home alone, we may keep you overnight. Insurance often won't pay for this.  After surgery  If it's hard  to control your pain or you need more pain medicine, please call your surgeon's office.  Questions?   If you have any questions for your care team, list them here:   ____________________________________________________________________________________________________________________________________________________________________________________________________________________________________________________________  For informational purposes only. Not to replace the advice of your health care provider. Copyright   2003, 2019 [facility]. All rights reserved. Clinically reviewed by Carlos Dyson MD. [employer] 415237 - REV 08/24.

## 2025-01-06 NOTE — PROGRESS NOTES
Preoperative Evaluation  [facility]  [address], [address]  [facility] [address]  Phone: [phone]  Primary Provider: Sherwin Mcdermott DO  Pre-op Performing Provider: Sherwin Mcdermott DO  Jan 6, 2025 1/6/2025   Surgical Information   What procedure is being done? pain pump battery replacement   Facility or Hospital where procedure/surgery will be performed: [facility]   Who is doing the procedure / surgery? dr. sofia   Date of surgery / procedure: 01/10/25   Time of surgery / procedure: 10:45am   Where do you plan to recover after surgery? at home with family     Fax number for surgical facility: [phone]    Assessment & Plan     The proposed surgical procedure is considered LOW risk.    Preop general physical exam  He is cleared to proceed with the battery exchange procedure as scheduled.    Spinal stenosis of lumbar region, unspecified whether neurogenic claudication present  He will continue current medications and keep following closely with his pain specialist.    Chronic pain syndrome    Continuous opioid dependence (H)  Stable on current medications.  He is following with his pain specialist.    Benign essential hypertension  Blood pressure is stable on current medications.  He is going to hold losartan on the morning of surgery.    Stage 3b chronic kidney disease (H)  This issue is stable.  On 12/27/2024 his creatinine was in the normal range at 0.86.    Type 2 diabetes mellitus with hyperglycemia, with long-term current use of insulin (H)  He reports the glucose numbers have been good in the TCU.  On 11/29/2024 his hemoglobin A1c was 7.6%.  He is going to take 16 units of the insulin glargine the night before surgery.  He is going to hold metformin on the morning of surgery and he takes Ozempic on Sundays.    Depressed bipolar I disorder in full remission (H)  Mood is stable on current medications.    History of peripheral stem  cell transplant (H)  This issue is stable.    Morbid obesity (H)  He is on Ozempic and working on lifestyle modifications to help with weight loss.    Peripheral vascular disease (H)  This issue is stable on current medications.            - No identified additional risk factors other than previously addressed    Preoperative Medication Instructions  Antiplatelet or Anticoagulation Medication Instructions   - aspirin: Bleeding risk is low for this procedure and daily aspirin may be continued without modification.     Additional Medication Instructions  Take all scheduled medications on the day of surgery EXCEPT for modifications listed below:  -Do NOT Take modafinil on morning of surgery.   - Herbal medications and vitamins: DO NOT TAKE 14 days prior to surgery.   - ACE/ARB/ARNI (lisinopril, enalapril, losartan, valsartan, olmesartan, sacubritril/valsartan) : DO NOT TAKE on day of surgery (minimum 11 hours for general anesthesia).   - Beta Blockers (atenolol, metoprolol, propranolol) : Continue taking on the day of surgery.   - Diuretics (furosemide, hydrochlorothiazide, chlorothalidone): DO NOT TAKE on the day of surgery.   - Statins (atorvastatin, simvastatin, pravastatin) : Continue taking on the day of surgery.    - Long acting insulin (e.g. glargine, detemir): Take 80% of the usual evening dose before surgery (16 Units).     - metformin: DO NOT TAKE day of surgery.    Recommendation  Approval given to proceed with proposed procedure, without further diagnostic evaluation.    The longitudinal plan of care for the diagnosis(es)/condition(s) as documented were addressed during this visit. Due to the added complexity in care, I will continue to support Erickson in the subsequent management and with ongoing continuity of care.      44 minutes spent by me on the date of the encounter doing chart review, review of outside records, review of test results, interpretation of tests, patient visit, and documentation      Lavinia Almendarez is a 69 year old, presenting for the following:  Pre-Op Exam          1/6/2025    11:42 AM   Additional Questions   Roomed by Xavier HARDEN related to upcoming procedure:     He has a complex medical history that includes peripheral vascular disease, peripheral neuropathy, coronary artery disease, history of NSTEMI with stent placement, amyloidosis, anxiety, depression, hypersomnia, psoriasis, bipolar 1 disorder, hypertension, diabetes mellitus type 2, hyperlipidemia, MEJIAS with liver cirrhosis, plaque psoriasis, restless leg syndrome, polyneuropathy, obstructive sleep apnea not using CPA and chronic pain with pain pump containing morphine and fentanyl.         1/6/2025   Pre-Op Questionnaire   Have you ever had a heart attack or stroke? No   Have you ever had surgery on your heart or blood vessels, such as a stent placement, a coronary artery bypass, or surgery on an artery in your head, neck, heart, or legs? (!) YES    Do you have chest pain with activity? No   Do you have a history of heart failure? No   Do you currently have a cold, bronchitis or symptoms of other infection? No   Do you have a cough, shortness of breath, or wheezing? No   Do you or anyone in your family have previous history of blood clots? No   Do you or does anyone in your family have a serious bleeding problem such as prolonged bleeding following surgeries or cuts? No   Have you ever had problems with anemia or been told to take iron pills? No   Have you had any abnormal blood loss such as black, tarry or bloody stools? No   Have you ever had a blood transfusion? No   Are you willing to have a blood transfusion if it is medically needed before, during, or after your surgery? Yes   Have you or any of your relatives ever had problems with anesthesia? No   Do you have sleep apnea, excessive snoring or daytime drowsiness? (!) YES   Do you have a CPAP machine? (!) NO    Do you have any artifical heart valves or other  implanted medical devices like a pacemaker, defibrillator, or continuous glucose monitor? No   Do you have artificial joints? No   Are you allergic to latex? No         Preoperative Review of    reviewed - controlled substances reflected in medication list.          Patient Active Problem List    Diagnosis Date Noted    Abdominal pain, generalized 05/30/2023     Priority: Medium    Lymphedema 03/12/2023     Priority: Medium    Benign prostatic hyperplasia 11/28/2022     Priority: Medium    Bilateral cataracts 11/28/2022     Priority: Medium    Cardiovascular stress test abnormal 11/28/2022     Priority: Medium    Contusion of rib 11/28/2022     Priority: Medium    Coronary arteriosclerosis 11/28/2022     Priority: Medium    Decreased testosterone level 11/28/2022     Priority: Medium    Chronic diarrhea 11/28/2022     Priority: Medium    Hernia of anterior abdominal wall 11/28/2022     Priority: Medium    History of anaphylaxis due to severe acute respiratory syndrome coronavirus 2 (SARS-CoV-2) vaccine 11/28/2022     Priority: Medium    Hypertensive renal disease 11/28/2022     Priority: Medium    Hypocalcemia 11/28/2022     Priority: Medium    Chronic pain 11/28/2022     Priority: Medium    Peripheral vascular disease (H) 02/08/2021     Priority: Medium    Chronic fatigue, unspecified 11/07/2020     Priority: Medium    Bilateral leg weakness 10/20/2020     Priority: Medium    Continuous opioid dependence (H) 08/06/2020     Priority: Medium    DDD (degenerative disc disease), lumbar 08/06/2020     Priority: Medium    Presence of implanted infusion pump 08/06/2020     Priority: Medium     Formatting of this note might be different from the original.  Bupivacaine, morphine and fentanyl; follows with Dr. Shaw at [facility]  Formatting of this note might be different from the original.  Formatting of this note might be different from the original.  Bupivacaine, morphine and fentanyl; follows with Dr. Shaw at  [facility]  Formatting of this note might be different from the original.  Formatting of this note might be different from the original.  Bupivacaine, morphine and fentanyl; follows with Dr. Shaw at [facility]  Formatting of this note might be different from the original.  Formatting of this note might be different from the original.  Bupivacaine, morphine and fentanyl; follows with Dr. Shaw at [facility]      Spinal stenosis at L4-L5 level 08/05/2020     Priority: Medium    Onychomycosis 06/29/2020     Priority: Medium    Nonalcoholic steatohepatitis 05/05/2020     Priority: Medium    Low blood pressure reading 05/16/2019     Priority: Medium    Polyneuropathy, unspecified 02/04/2019     Priority: Medium    Acquired lymphedema 02/04/2019     Priority: Medium    Venous stasis dermatitis of both lower extremities 12/31/2018     Priority: Medium    Retention of urine 12/24/2018     Priority: Medium    Morbid obesity (H) 08/28/2018     Priority: Medium    Benign neoplasm of cecum 08/03/2018     Priority: Medium    Benign neoplasm of transverse colon 08/03/2018     Priority: Medium    History of colonic polyps 08/01/2018     Priority: Medium     Formatting of this note might be different from the original.  Overview:   1/15-rxed with antibiotics  Formatting of this note might be different from the original.  Formatting of this note might be different from the original.  Overview:   1/15-rxed with antibiotics  Formatting of this note might be different from the original.  Formatting of this note might be different from the original.  Overview:   1/15-rxed with antibiotics      Polyp of colon 08/01/2018     Priority: Medium    Leg edema 05/20/2018     Priority: Medium     Noncardiac  Continue with  furosemide (LASIX) 20 MG tablet,   potassium       chloride SA (K-DUR/KLOR-CON M) 10 MEQ CR  Tab once daily   Basic metabolic panel      Right upper quadrant pain 09/05/2017     Priority: Medium    Restless legs syndrome (RLS) 06/29/2017      Priority: Medium    History of peripheral stem cell transplant (H) 12/16/2016     Priority: Medium    Cannabis dependence in remission (H) 10/27/2016     Priority: Medium    Depressed bipolar I disorder in full remission (H) 10/27/2016     Priority: Medium    Insomnia due to medical condition 08/23/2016     Priority: Medium    Light chain (AL) amyloidosis (H) 06/01/2016     Priority: Medium      #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 7/3/2018      Dr [id number]      Localized enlarged lymph nodes 04/28/2016     Priority: Medium     Formatting of this note might be different from the original.  Portacaval nodes up to 3.5 cm in diameter on abd CT 4/28/2016  Formatting of this note might be different from the original.  Formatting of this note might be different from the original.  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  Formatting of this note might be different from the original.  1/15-rxed with antibiotics  Formatting of this note might be different from the original.  Formatting of this note might be different from the original.  1/15-rxed with antibiotics      Obstructive sleep apnea syndrome 12/19/2013     Priority: Medium     sleep study  Formatting of this note might be different from the original.  Overview:   sleep study  Formatting of this note might be different from the  original.  Formatting of this note might be different from the original.  Overview:   sleep study  Formatting of this note might be different from the original.  sleep study  Formatting of this note might be different from the original.  Formatting of this note might be different from the original.  Overview:   sleep study  Formatting of this note might be different from the original.  sleep study  Formatting of this note might be different from the original.  Formatting of this note might be different from the original.  Overview:   sleep study  Formatting of this note might be different from the original.  sleep study      Other specified anxiety disorders 12/18/2013     Priority: Medium    Migraine headache 05/29/2012     Priority: Medium    Hypertension goal BP (blood pressure) < 140/90 10/11/2011     Priority: Medium    Type 2 diabetes mellitus (H) 10/11/2011     Priority: Medium    Essential hypertension 10/11/2011     Priority: Medium    Hyperlipidemia 10/31/2010     Priority: Medium    Low back pain 06/17/2008     Priority: Medium      Past Medical History:   Diagnosis Date    Acquired lymphedema 02/04/2019    Allergic state     Amyloidosis (H)     followed by hematology Dr. Romeo status post peripheral stem cell transplant    Anxiety 05/11/2016    Anxiety and depression 12/18/2013    Bipolar I disorder (H) 09/01/2015    Under care of psychiatrist NEISHA- Dr Rahman doxepin 25 mg, 2 cap bedtime DULoxetine 20 mg once daily  OLANZapine 7.5 mg  1 tab bedtime     Cerebral infarction, unspecified (H) 07/13/2015    DDD (degenerative disc disease), lumbar 08/06/2020    Depressive disorder 1995    EKG abnormality 04/20/2020    H/O bone marrow transplant (H)     Headache(784.0)     History of diverticulitis 01/27/2015    1/15-rxed with antibiotics     Hyperlipidemia LDL goal <100 10/31/2010    Hypertension 2007    Hypertension goal BP (blood pressure) < 140/90 10/11/2011    Marianne (H) 08/20/2015    Morbid obesity (H)  08/28/2018    Myalgia and myositis, unspecified     Narcotic dependence (H) 09/06/2016    None in about 6 months- 8/1/17    NSTEMI (non-ST elevated myocardial infarction) (H) 04/19/2020    OCD (obsessive compulsive disorder)     Other acquired absence of organ 1994    Other cirrhosis of liver (H) possibly from vences  04/15/2020    Other specified viral warts     Peripheral edema 05/20/2018    Noncardiac Continue with  furosemide (LASIX) 20 MG tablet,  potassium       chloride SA (K-DUR/KLOR-CON M) 10 MEQ CR  Tab once daily  Basic metabolic panel    Polyneuropathy associated with underlying disease (H) 02/04/2019    Restless legs syndrome (RLS) 06/29/2017    Stasis dermatitis of both legs 12/31/2018    Type 2 diabetes mellitus with other specified complication (H) 07/10/2017     Past Surgical History:   Procedure Laterality Date    ABDOMEN SURGERY  2007    abdominal hernia    BACK SURGERY  8/6/2020    BIOPSY  june 2015    negative    BMT PROTOCOL      for systemic amyloidosis    BONE MARROW BIOPSY, BONE SPECIMEN, NEEDLE/TROCAR N/A 6/8/2016    Procedure: BIOPSY BONE MARROW;  Surgeon: Nathan Agrawal MD;  Location:  GI    BONE MARROW BIOPSY, BONE SPECIMEN, NEEDLE/TROCAR N/A 2/20/2019    Procedure: BIOPSY BONE MARROW;  Surgeon: Michael Raygoza MD;  Location:  GI    CHOLECYSTECTOMY  1995    lap qian    COLONOSCOPY  2012    hx polyps    ESOPHAGOSCOPY, GASTROSCOPY, DUODENOSCOPY (EGD), COMBINED N/A 5/29/2015    Procedure: COMBINED ESOPHAGOSCOPY, GASTROSCOPY, DUODENOSCOPY (EGD), BIOPSY SINGLE OR MULTIPLE;  Surgeon: John Jacob MD;  Location:  GI    HERNIA REPAIR, UMBILICAL  2006    RUST NONSPECIFIC PROCEDURE  94    Cholecystectomy    RUST NONSPECIFIC PROCEDURE  2000    repair deviated septum     Current Outpatient Medications   Medication Sig Dispense Refill    atorvastatin (LIPITOR) 20 MG tablet Take 1.5 tablets (30 mg) by mouth daily 135 tablet 3    buPROPion (WELLBUTRIN XL) 150 MG 24 hr  tablet TAKE ONE TABLET BY MOUTH EVERY MORNING 90 tablet 0    cariprazine (VRAYLAR) 4.5 MG capsule Take 1 capsule (4.5 mg) by mouth daily 90 capsule 3    Continuous Glucose Sensor (FREESTYLE VERA 3 PLUS SENSOR) MISC Use 1 sensor every 15 days. Use to read blood sugars per 's instructions. 6 each 3    DULoxetine (CYMBALTA) 30 MG capsule Take 1 capsule (30 mg) by mouth 2 times daily 180 capsule 3    furosemide (LASIX) 20 MG tablet TAKE TWO TABLETS BY MOUTH ONCE DAILY FOR 7 DAYS . THEN TAKE ONE TABLET BY MOUTH ONCE DAILY (Patient taking differently: Take 20 mg by mouth daily.) 90 tablet 0    hydrochlorothiazide (HYDRODIURIL) 25 MG tablet Take 1 tablet (25 mg) by mouth daily.      insulin glargine 100 UNIT/ML pen Inject 20 Units subcutaneously at bedtime. (Max 60 units) 75 mL 3    insulin pen needle (31G X 8 MM) 31G X 8 MM miscellaneous Use one pen needles daily or as directed. 100 each 1    loperamide (IMODIUM) 2 MG capsule Take 1 capsule (2 mg) by mouth 4 times daily as needed for diarrhea      losartan (COZAAR) 100 MG tablet Take 1 tablet (100 mg) by mouth daily 90 tablet 3    medication given by implanted intrathecal pump continuous Drug # 1: Fentanyl (Sublimaze) - Conc: 2000 mcg/mL - Total Dose / 24 hours: 1663.3 mcg  Drug # 2: Bupivacaine (Marcaine)  - Conc: 20 mg/mL - Total Dose / 24 hours: 16.633 mg  Drug # 3: Morphine (Duramorph or Infumorph)  - Conc: 9.6 mg/mL - Total Dose / 24 hours: 7.9836 mg    Pump Reservoir Volume: 40 mL  Pump Reservoir Alarm Volume: 2 ml  Outside Clinic & Provider: [facility]  Last Refill Date: 12/21/23  Next Refill Date: 1/28/24  Low [place] Alarm Date:  Pump : Workspot SynchroMed II    Boluses: Up to 3 per day, 4 minute duration. Lock-out every 6 hours (also has dose restriction interval noted 1 per 8 hours)   Fentanyl: 110.1 mcg/dose  Bupivacaine: 1.101 mg/dose  Morphine: 0.5287 mg/dose      metoprolol succinate ER (TOPROL XL) 100 MG  24 hr tablet TAKE ONE TABLET BY MOUTH ONCE DAILY 90 tablet 3    modafinil (PROVIGIL) 100 MG tablet Take 100 mg by mouth 2 times daily. Morning and evening      nitroGLYcerin (NITROSTAT) 0.4 MG sublingual tablet Place 1 tablet (0.4 mg) under the tongue every 5 minutes as needed for chest pain. For chest pain place 1 tablet under the tongue every 5 minutes for 3 doses. If symptoms persist 5 minutes after 1st dose call 911. 30 tablet 1    oxyCODONE (ROXICODONE) 5 MG tablet Take 5 mg by mouth every 6 hours as needed for moderate to severe pain      pregabalin (LYRICA) 100 MG capsule Take 1 capsule (100 mg) by mouth at bedtime. (Patient taking differently: Take 100 mg by mouth 2 times daily.) 30 capsule 2    semaglutide (OZEMPIC) 2 MG/3ML pen Inject 0.25 mg subcutaneously every 7 days.      SKYRIZI  MG/ML subcutaneous Inject 150 mg Subcutaneous once      SUMAtriptan (IMITREX) 50 MG tablet Take 1 tablet (50 mg) by mouth at onset of headache for migraine May repeat in 2 hours. Max 4 tablets/24 hours. 8 tablet 1    tamsulosin (FLOMAX) 0.4 MG capsule TAKE 1 CAPSULE (0.4 MG) BY MOUTH 2 TIMES DAILY 180 capsule 3    vitamin D3 (CHOLECALCIFEROL) 50 mcg (2000 units) tablet Take 1 tablet (50 mcg) by mouth daily 90 tablet 3    aspirin (ASPIRIN LOW DOSE) 81 MG tablet Take 1 tablet (81 mg) by mouth daily (Patient not taking: Reported on 11/21/2024) 30 tablet 3       Allergies   Allergen Reactions    Amoxicillin Diarrhea    Cephalexin Diarrhea    Liraglutide Other (See Comments), Headache and Unknown     Other reaction(s): Headache, Unknown  PN: migraines - Increased migraine frequency and severity      Sulfa Antibiotics Angioedema and Swelling     Pt has taken  Taken sulfa drugs orally without trouble. He had problems with Sulfa eye drops. Eye swelled up          Social History     Tobacco Use    Smoking status: Never    Smokeless tobacco: Never   Substance Use Topics    Alcohol use: Not Currently     Alcohol/week: 0.0  "standard drinks of alcohol       History   Drug Use Unknown             Review of Systems  Constitutional, HEENT, cardiovascular, pulmonary, GI, , musculoskeletal, neuro, skin, endocrine and psych systems are negative, except as otherwise noted.    Objective    BP 95/64   Pulse 85   Temp 96.8  F (36  C) (Temporal)   Resp 16   Ht 1.689 m (5' 6.5\")   Wt 110.7 kg (244 lb)   SpO2 92%   BMI 38.79 kg/m     Estimated body mass index is 38.79 kg/m  as calculated from the following:    Height as of this encounter: 1.689 m (5' 6.5\").    Weight as of this encounter: 110.7 kg (244 lb).  Physical Exam  GENERAL: alert and no distress  EYES: Eyes grossly normal to inspection, PERRL and conjunctivae and sclerae normal  HENT: nose and mouth without ulcers or lesions  NECK: no adenopathy, no asymmetry, masses, or scars  RESP: lungs clear to auscultation - no rales, rhonchi or wheezes  CV: regular rate and rhythm, normal S1 S2, no S3 or S4, no murmur, click or rub  ABDOMEN: soft, nontender, no hepatosplenomegaly, no masses and bowel sounds normal  MS: no gross musculoskeletal defects noted, 1+ lower extremity edema  SKIN: no suspicious lesions or rashes  NEURO: Normal strength and tone, mentation intact and speech normal  PSYCH: mentation appears normal, affect normal/bright    Recent Labs   Lab Test 11/20/24  1508 10/30/24  1118 10/18/24  1555 07/09/24  0903   HGB 12.4*  --  13.8 13.8   *  --  190 159   INR  --   --   --  1.04    135 136 133*   POTASSIUM 4.0 4.3 4.2 3.8   CR 1.47* 1.40* 2.54* 0.98   A1C  --   --  8.3* 9.4*        Diagnostics  No labs were ordered during this visit.   No EKG required for low risk surgery (cataract, skin procedure, breast biopsy, etc).    Revised Cardiac Risk Index (RCRI)  The patient has the following serious cardiovascular risks for perioperative complications:   - Coronary Artery Disease (MI, positive stress test, angina, Qs on EKG) = 1 point   - Diabetes Mellitus (on Insulin) " = 1 point     RCRI Interpretation: 2 points: Class III (moderate risk - 6.6% complication rate)     Estimated Functional Capacity: Performs 4 METS exercise without symptoms (e.g., light housework, stairs, 4 mph walk, 7 mph bike, slow step dance)           Signed Electronically by: Sherwin Mcdermott DO  A copy of this evaluation report is provided to the requesting physician.         Answers submitted by the patient for this visit:  Patient Health Questionnaire (Submitted on 1/6/2025)  If you checked off any problems, how difficult have these problems made it for you to do your work, take care of things at home, or get along with other people?: Somewhat difficult  PHQ9 TOTAL SCORE: 5  Patient Health Questionnaire (G7) (Submitted on 1/6/2025)  NANCY 7 TOTAL SCORE: 0

## 2025-01-09 ENCOUNTER — TELEPHONE (OUTPATIENT)
Dept: FAMILY MEDICINE | Facility: CLINIC | Age: 70
End: 2025-01-09

## 2025-01-09 NOTE — TELEPHONE ENCOUNTER
Forms/Letter Request    Type of form/letter: OTHER:     See Barb Huerta encounter note 12/6/24.    Prescription assistance for Ozempic    Do we have the form/letter: Yes    Who is the form from?   Barb Huerta  Inter-departmental mail    Where did/will the form come from?     Inter-departmental mail    When is form/letter needed by: n/a    How would you like the form/letter returned:   Return to Barb Huerta in the inter-departmental mail envelope she provided.    Patient Notified form requests are processed in 5-7 business days:No    Could we send this information to you in [employer] or would you prefer to receive a phone call?:   n/a    Placed in Dr. Radha Mcdermott's office bin.

## 2025-01-13 ENCOUNTER — TRANSFERRED RECORDS (OUTPATIENT)
Dept: HEALTH INFORMATION MANAGEMENT | Facility: CLINIC | Age: 70
End: 2025-01-13
Payer: COMMERCIAL

## 2025-03-03 ENCOUNTER — PATIENT OUTREACH (OUTPATIENT)
Dept: CARE COORDINATION | Facility: CLINIC | Age: 70
End: 2025-03-03
Payer: COMMERCIAL

## 2025-03-05 DIAGNOSIS — R60.0 BILATERAL LOWER EXTREMITY EDEMA: ICD-10-CM

## 2025-03-05 DIAGNOSIS — F31.76 DEPRESSED BIPOLAR I DISORDER IN FULL REMISSION: ICD-10-CM

## 2025-03-05 DIAGNOSIS — R33.9 URINARY RETENTION WITH INCOMPLETE BLADDER EMPTYING: ICD-10-CM

## 2025-03-05 DIAGNOSIS — E11.65 TYPE 2 DIABETES MELLITUS WITH HYPERGLYCEMIA, WITH LONG-TERM CURRENT USE OF INSULIN (H): Primary | ICD-10-CM

## 2025-03-05 DIAGNOSIS — Z79.4 TYPE 2 DIABETES MELLITUS WITH HYPERGLYCEMIA, WITH LONG-TERM CURRENT USE OF INSULIN (H): Primary | ICD-10-CM

## 2025-03-06 RX ORDER — INSULIN GLARGINE 100 [IU]/ML
20 INJECTION, SOLUTION SUBCUTANEOUS AT BEDTIME
Qty: 20 ML | Refills: 3 | Status: SHIPPED | OUTPATIENT
Start: 2025-03-06

## 2025-03-06 RX ORDER — BUPROPION HYDROCHLORIDE 150 MG/1
150 TABLET ORAL EVERY MORNING
Qty: 90 TABLET | Refills: 0 | Status: SHIPPED | OUTPATIENT
Start: 2025-03-06

## 2025-03-06 RX ORDER — MODAFINIL 100 MG/1
100 TABLET ORAL 2 TIMES DAILY
OUTPATIENT
Start: 2025-03-06

## 2025-03-06 RX ORDER — FUROSEMIDE 20 MG/1
20 TABLET ORAL DAILY
Qty: 90 TABLET | Refills: 1 | Status: SHIPPED | OUTPATIENT
Start: 2025-03-06

## 2025-03-06 RX ORDER — TAMSULOSIN HYDROCHLORIDE 0.4 MG/1
0.4 CAPSULE ORAL 2 TIMES DAILY
Qty: 180 CAPSULE | Refills: 1 | Status: SHIPPED | OUTPATIENT
Start: 2025-03-06

## 2025-03-06 NOTE — TELEPHONE ENCOUNTER
DE,    Called patient  Patient states he thinks has been taking it daily and has not had a lapse  Per dispense report patient last dispensed in 06/24  Pended if approved    Thanks,  Priscilla BOBBY RN

## 2025-03-06 NOTE — TELEPHONE ENCOUNTER
Clinic RN: Please investigate patient's chart or contact patient if the information cannot be found because patient should have run out of this medication on 12/2024. Confirm patient is taking this medication as prescribed. Document findings and route refill encounter to provider for approval or denial.

## 2025-03-07 ENCOUNTER — TELEPHONE (OUTPATIENT)
Dept: FAMILY MEDICINE | Facility: CLINIC | Age: 70
End: 2025-03-07
Payer: COMMERCIAL

## 2025-03-07 DIAGNOSIS — F31.9 BIPOLAR I DISORDER (H): ICD-10-CM

## 2025-03-07 NOTE — TELEPHONE ENCOUNTER
Clinic RN: Please investigate patient's chart or contact patient if the information cannot be found because  second request.  Requested yesterday and refused due to originating provider to prescribe.  Please inform pt   Rhoda Williamson RN, BSN  [facility]

## 2025-03-07 NOTE — TELEPHONE ENCOUNTER
DE,    Appears patient last prescribed by you 10/30/2024  Refill pended if approved    Thanks  Priscilla BOBBY RN

## 2025-03-08 RX ORDER — MODAFINIL 100 MG/1
100 TABLET ORAL 2 TIMES DAILY
OUTPATIENT
Start: 2025-03-08

## 2025-03-08 NOTE — TELEPHONE ENCOUNTER
Erickson sees Amita Medrano, in [place], who is his regular Modafanil prescriber.  According to the PMD website she has been prescribing this monthly for him.  I recommend that he reach out to her to get his next refill. -DE

## 2025-03-10 NOTE — TELEPHONE ENCOUNTER
Called and spoke with patient  Patient thought request had been sent to her  Stated would reach out to her office to request refill    Priscilla BOBBY RN

## 2025-03-11 ENCOUNTER — MEDICAL CORRESPONDENCE (OUTPATIENT)
Dept: HEALTH INFORMATION MANAGEMENT | Facility: CLINIC | Age: 70
End: 2025-03-11

## 2025-03-12 ENCOUNTER — PATIENT OUTREACH (OUTPATIENT)
Dept: CARE COORDINATION | Facility: CLINIC | Age: 70
End: 2025-03-12
Payer: COMMERCIAL

## 2025-03-12 ENCOUNTER — TELEPHONE (OUTPATIENT)
Dept: FAMILY MEDICINE | Facility: CLINIC | Age: 70
End: 2025-03-12
Payer: COMMERCIAL

## 2025-03-12 NOTE — PROGRESS NOTES
Connected Care Resource [place]  TCU Discharge    Clinical Data: Per SW are [facility] [place] [facility], patient has discharged from TCU to home/community setting.    Assessment/Plan: Transitional Care Management (TCM) program initiated to prompt post-discharge outreach call by CCRC team member.     Lucrecia Guthrie  Connected Care Resource Center  [facility]

## 2025-03-12 NOTE — TELEPHONE ENCOUNTER
Triage,   Received fax from [facility].   Liraglutide is not covered. Are you willing to switch to trulicity which is covered?    Contact information:  SERGEY Ley.   Phone [phone].   Fax [phone].     Thanks!  Ifeoma ALEX

## 2025-03-12 NOTE — TELEPHONE ENCOUNTER
Appears liraglutide last prescribed 2013?   Called [facility] to advise outreach to prescribing provider  Unsure if secure voicemail, left non detailed voicemail for return call to clinic  Thanks,  Divina AGRAWAL RN

## 2025-03-13 ENCOUNTER — PATIENT OUTREACH (OUTPATIENT)
Dept: CARE COORDINATION | Facility: CLINIC | Age: 70
End: 2025-03-13
Payer: COMMERCIAL

## 2025-03-13 NOTE — PROGRESS NOTES
Clinic Care Coordination Contact  Transitions of Care Outreach    Chief Complaint   Patient presents with    Clinic Care Coordination - Homecare/TCU       Most Recent Admission Date:   Most Recent Admission Diagnosis: Low back pain, Hyperlipidemia, Hypertension goal BP (blood pressure) < 140/90 & Type 2 diabetes mellitus (H)    Most Recent Discharge Date: 03/11/2025  Most Recent Discharge Diagnosis: Low back pain, Hyperlipidemia, Hypertension goal BP (blood pressure) < 140/90 & Type 2 diabetes mellitus (H)    Transitions of Care Assessment    Discharge Assessment  How are you doing now that you are home?: He is doing fine and is now in an Assisted Living. The food is good here at the Assisted Living.  How are your symptoms? (Red Flag symptoms escalate to triage hotline per guidelines): Improved  Do you know how to contact your clinic care team if you have future questions or changes to your health status? : Yes  Does the patient have their discharge instructions? : Yes  Does the patient have questions regarding their discharge instructions? : No  Were you started on any new medications or were there changes to any of your previous medications? : Yes  Does the patient have all of their medications?: Yes  Do you have questions regarding any of your medications? : No  Do you have all of your needed medical supplies or equipment (DME)?  (i.e. oxygen tank, CPAP, cane, etc.): Yes              CCRC Explained and offered Care Coordination support to eligible patients: No    Patient accepted? No, because he currently lives in an Assisted Living. He said he has everything he needs.     Follow up Plan     Discharge Follow-Up  Discharge follow up appointment scheduled in alignment with recommended follow up timeframe or Transitions of Risk Category? (Low = within 30 days; Moderate= within 14 days; High= within 7 days): Yes  Discharge Follow Up Appointment Date: 03/21/25  Discharge Follow Up Appointment Scheduled with?: Primary  Care Provider    Future Appointments   Date Time Provider Department Center   3/14/2025 11:00 AM SH LAB ONLY [facility]   3/21/2025 11:30 AM Charly Hester MD [facility]   5/20/2025 12:30 PM Lindsey Haddad, PhD MGNP [facility]       Outpatient Plan as outlined on AVS reviewed with patient.      For any urgent concerns, please contact our 24 hour nurse triage line: [phone]       Carrie Interiano  Ambulatory Care  - [facility]

## 2025-03-13 NOTE — LETTER
Parmjit Hernández  [address]  [place] [address]    Dear Parmjit Hernández,      I am a team member within the Connected Care Resource Center with [facility]. I recently reach you to ensure you were doing well following a recent visit within our health system. I also wanted to take this chance to introduce Clinic Care Coordination.     Below is a description of Clinic Care Coordination and how this team can further assist you:       The Clinic Care Coordination team is made up of a Registered Nurse, , Financial Resource Worker, and a Community Health Worker who understand and can help navigate the health care system. The goal of clinic care coordination is to help you manage your health, improve access to care, and achieve optimal health outcomes. They work alongside your provider to assist you in determining your health and social needs, obtain health care and community resources, and provide you with necessary information and education. Clinic Care Coordination can work with you through any barriers and develop a care plan that helps coordinate and strengthen the relationship between you and your care team.    If you wish to connect with the Clinic Care Coordination Team, please let your [facility] Primary Care Provider or Clinic Care Team know and they can place a referral. The Clinic Care Coordination team will then reach out by phone to further support you.    We are focused on providing you with the highest-quality healthcare experience possible.    Sincerely,   Your care team with [facility]'s [address] [place] [address] ([phone]).

## 2025-03-14 NOTE — TELEPHONE ENCOUNTER
Left non detailed voicemail for [facility] RN asking that they callback.  When they call back please advise we have not prescribed liraglutide since 2013  They should reach out to prescribing provider with notification of coverage and request for different medication  Priscilla BOBBY RN

## 2025-03-17 ENCOUNTER — PATIENT OUTREACH (OUTPATIENT)
Dept: CARE COORDINATION | Facility: CLINIC | Age: 70
End: 2025-03-17
Payer: COMMERCIAL

## 2025-03-20 ENCOUNTER — TELEPHONE (OUTPATIENT)
Dept: FAMILY MEDICINE | Facility: CLINIC | Age: 70
End: 2025-03-20
Payer: COMMERCIAL

## 2025-03-20 DIAGNOSIS — E11.69 TYPE 2 DIABETES MELLITUS WITH OTHER SPECIFIED COMPLICATION, WITHOUT LONG-TERM CURRENT USE OF INSULIN (H): ICD-10-CM

## 2025-03-20 RX ORDER — INSULIN GLARGINE 100 [IU]/ML
20 INJECTION, SOLUTION SUBCUTANEOUS AT BEDTIME
Qty: 75 ML | Refills: 3 | OUTPATIENT
Start: 2025-03-20

## 2025-03-20 NOTE — TELEPHONE ENCOUNTER
Erickson's Victoria assistance application has been submitted to the [employer] assistance program.    We will note Epic when we receive a decision.    Thanks so much for your help!    Barb Huerta  Prescription Assistance Supervisor  Pharmacy Assistance   Pool: Rx[facility]ist

## 2025-03-20 NOTE — TELEPHONE ENCOUNTER
Erickson's Ozempic assistance application has been submitted to the [employer] assistance program.    We will note Epic when we receive a decision.    Thanks so much for your help!    Barb Huerta  Prescription Assistance Supervisor  Pharmacy Assistance   Pool: [facility]ist

## 2025-03-20 NOTE — TELEPHONE ENCOUNTER
I am in process of applying to the Basaglar kwik pen assistance program. [facility] requires a hand signed, brand name, hard copy script be submitted with their application.    Please hand sign a BRAND name, hard copy script for:    BASAGLAR SUHA PEN, please write for a 4 month supply & 2 refills.    Please send the HARD COPY script to ME at--   I must submit this script with the assistance application.    via interoffice mail  FPS Barb Navarro     or via US mail  at:   [facility]  Barb Huerta  [address] [place]  [address]    Thanks so much for your help!    Barb Huerta  Prescription Assistance Supervisor  Pharmacy Assistance   Pool: Rx[facility]ist

## 2025-03-22 DIAGNOSIS — E78.5 HYPERLIPIDEMIA LDL GOAL <100: ICD-10-CM

## 2025-03-22 NOTE — TELEPHONE ENCOUNTER
Medication Question or Refill    Contacts       Contact Date/Time Type Contact Phone/Fax    03/22/2025 01:46 PM CDT Phone (Incoming) Erickson Hernández (Self) [phone] (M)            What medication are you calling about (include dose and sig)?: modafinil (PROVIGIL) 100 MG tablet  atorvastatin (LIPITOR) 20 MG tablet      Preferred Pharmacy:  [facility] [address]  [address] [address]  Phone: [phone] Fax: [phone]      Controlled Substance Agreement on file:   CSA -- Patient Level:    CSA: None found at the patient level.       Who prescribed the medication?: Dr Radha Mcdermott    Do you need a refill? Yes    When did you use the medication last?      Patient offered an appointment? No    Do you have any questions or concerns?  Yes: out of both medication       Could we send this information to you in [facility] or would you prefer to receive a phone call?:   Patient would prefer a phone call   Okay to leave a detailed message?: Yes at Cell number on file:    Telephone Information:   Mobile [phone]      Missing Information  Received: Today  Ale Mitchell RN  Cc: Ale Salcedo  Good Afternoon     Could you please provide the CPT Code with the procedure and the Diagnosis Code.     Thank you

## 2025-03-24 DIAGNOSIS — E78.5 HYPERLIPIDEMIA LDL GOAL <100: ICD-10-CM

## 2025-03-24 RX ORDER — ATORVASTATIN CALCIUM 20 MG/1
30 TABLET, FILM COATED ORAL DAILY
Qty: 135 TABLET | Refills: 3 | Status: SHIPPED | OUTPATIENT
Start: 2025-03-24

## 2025-03-24 RX ORDER — ATORVASTATIN CALCIUM 20 MG/1
30 TABLET, FILM COATED ORAL DAILY
Qty: 135 TABLET | Refills: 3 | OUTPATIENT
Start: 2025-03-24

## 2025-03-24 RX ORDER — MODAFINIL 100 MG/1
100 TABLET ORAL 2 TIMES DAILY
Status: CANCELLED | OUTPATIENT
Start: 2025-03-24

## 2025-03-24 RX ORDER — ATORVASTATIN CALCIUM 20 MG/1
30 TABLET, FILM COATED ORAL DAILY
Qty: 135 TABLET | Refills: 0 | OUTPATIENT
Start: 2025-03-24

## 2025-03-24 NOTE — TELEPHONE ENCOUNTER
DE,    Patient states he has been taking daily  Pended if approved    Thanks,  Priscilla BOBBY RN

## 2025-03-24 NOTE — TELEPHONE ENCOUNTER
Clinic RN: Please investigate patient's chart or contact patient if the information cannot be found because the medication is listed as historical or discontinued. Confirm patient is taking this medication. Document findings and route refill encounter to provider for approval or denial.    Jose F Heath, RN, BSN, MSN  RN Lead

## 2025-03-25 RX ORDER — MODAFINIL 100 MG/1
100 TABLET ORAL 2 TIMES DAILY
OUTPATIENT
Start: 2025-03-25

## 2025-04-01 ENCOUNTER — TELEPHONE (OUTPATIENT)
Dept: FAMILY MEDICINE | Facility: CLINIC | Age: 70
End: 2025-04-01
Payer: COMMERCIAL

## 2025-04-01 NOTE — TELEPHONE ENCOUNTER
Erickson has been approved to the  assistance program for Ozempic and Vraylar through December 31,2025. He will receive these medications at no cost through the enrollment period. He has been informed of this approval and delivery.      A 120 day supply of Ozempic will be delivered to the [facility] within 10-14 business days. Please contact patient upon arrival. [facility] will contact Erickson to schedule first shipment of Vraylar to Erickson's home.     Please let us know of any dose changes. Patient will need to contact our office for refills of Ozempic about 2 months before running out. [phone]  Pt will need to contact [facility] for Vraylar refills about a month before running out. [phone]    Thank you!  Anne Marie Powell   Prescription

## 2025-04-01 NOTE — TELEPHONE ENCOUNTER
Sending as FYI to PCP & [facility] staff (see below).     Rooming team - please be aware of incoming medication that may require refrigeration.   TC team - please assist with contacting patient once medication arrives.    Thanks!  Amita DENNIS RN

## 2025-04-22 ENCOUNTER — PATIENT OUTREACH (OUTPATIENT)
Dept: CARE COORDINATION | Facility: CLINIC | Age: 70
End: 2025-04-22
Payer: COMMERCIAL

## 2025-04-22 NOTE — PROGRESS NOTES
Clinic Care Coordination Contact  Transitions of Care Outreach  Chief Complaint   Patient presents with    Clinic Care Coordination - Post Hospital              Patient was admitted to [facility] 4/20 - 4/21/25 for Hypotension and lactic acidosis secondary to severe dehydration. Per AVS patient needs follow up with PCP in 1-2 weeks with repeat BMP and blood sugar labs.     Transitions of Care Assessment    Discharge Assessment  How are you doing now that you are home?: RN Clinical Product Navigator called and spoke with Erickson. Erickson states that he is feeling well since hospital discharge. Denies any new concerns or complaints. Denies any on going care coordination needs at this time. Reviewed AVS instructions for follow up with PCP in 1-2 weeks with repeat BMP and blood sugar labs. Patient is agreeable to have CPN assist in scheduling this appointment. Prefers 9:30am appointment, any day of the week.      RN Clinical Product Navigator called and spoke with scheduling. Patient is now scheudled to follow up with PCP next Monday 4/28/25 at 10:30am, 10:15am arrival time.     RN Clinical Product Navigator called back and spoke with Erickson, reviewed above appointment information. Patient confirms that this day/time works for him. Patient denies any questions or concerns at this time, patient is comfortable calling his PCP clinic as needs arise.  How are your symptoms? (Red Flag symptoms escalate to triage hotline per guidelines): Improved  Do you know how to contact your clinic care team if you have future questions or changes to your health status? : Yes  Does the patient have their discharge instructions? : Yes  Does the patient have questions regarding their discharge instructions? : No  Were you started on any new medications or were there changes to any of your previous medications? : No  Does the patient have all of their medications?: Yes  Do you have questions regarding any of your medications? :  No  Do you have all of your needed medical supplies or equipment (DME)?  (i.e. oxygen tank, CPAP, cane, etc.): Yes              Care Management       Care Mgmt General Assessment  Referral  Referral Source: Health Plan                      Follow up Plan     Discharge Follow-Up  Discharge follow up appointment scheduled in alignment with recommended follow up timeframe or Transitions of Risk Category? (Low = within 30 days; Moderate= within 14 days; High= within 7 days): No  Discharge Follow Up Appointment Date: 04/28/25  Discharge Follow Up Appointment Scheduled with?: Primary Care Provider  Patient's follow up appointment not scheduled: Patient accepted scheduling support. Appt scheduled/requested per protocol.    Future Appointments   Date Time Provider Department Center   4/28/2025 10:30 AM Sherwin Mejia, DO [facility]   5/20/2025 12:30 PM Lindsey Haddad, PhD [facility]       Outpatient Plan as outlined on AVS reviewed with patient.    For any urgent concerns, please contact our 24 hour nurse triage line: [phone] ([phone])       Margo Rai RN

## 2025-04-22 NOTE — PROGRESS NOTES
Clinical Product Navigator RN reviewed chart; patient on payer product coverage.  Review results:   CPN Initial Information Gathering  Referral Source: Health Plan    Patient identified by Health Plan as a potential candidate for Primary Care Coordination due to recent admission. Per chart review, patient was admitted to [facility] 4/20 - 4/21/25 for Hypotension and lactic acidosis secondary to severe dehydration. Per AVS patient needs follow up with PCP in 1-2 weeks with repeat BMP and blood sugar labs.     RN Clinical Product Navigator called and spoke with Erickson. Erickson states that he is feeling well since hospital discharge. Denies any new concerns or complaints. Denies any on going care coordination needs at this time. Reviewed AVS instructions for follow up with PCP in 1-2 weeks with repeat BMP and blood sugar labs. Patient is agreeable to have CPN assist in scheduling this appointment. Prefers 9:30am appointment, any day of the week.      RN Clinical Product Navigator called and spoke with scheduling. Patient is now scheudled to follow up with PCP next Monday 4/28/25 at 10:30am, 10:15am arrival time.     RN Clinical Product Navigator called back and spoke with Erickson, reviewed above appointment information. Patient confirms that this day/time works for him. Patient denies any questions or concerns at this time, patient is comfortable calling his PCP clinic as needs arise.     Elsa Rai RN   Clinical Product Navigator   [email or web address]   Office: [phone]

## 2025-04-29 ENCOUNTER — TELEPHONE (OUTPATIENT)
Dept: FAMILY MEDICINE | Facility: CLINIC | Age: 70
End: 2025-04-29
Payer: COMMERCIAL

## 2025-04-29 ENCOUNTER — PATIENT OUTREACH (OUTPATIENT)
Dept: CARE COORDINATION | Facility: CLINIC | Age: 70
End: 2025-04-29
Payer: COMMERCIAL

## 2025-04-29 ENCOUNTER — MEDICAL CORRESPONDENCE (OUTPATIENT)
Dept: HEALTH INFORMATION MANAGEMENT | Facility: CLINIC | Age: 70
End: 2025-04-29

## 2025-04-29 NOTE — TELEPHONE ENCOUNTER
Patient calling to schedule hospital follow up visit  Scheduled with PCP tomorrow    Priscilla BOBBY RN

## 2025-04-29 NOTE — PROGRESS NOTES
"Clinic Care Coordination Contact  Transitions of Care Outreach  Chief Complaint   Patient presents with    Clinic Care Coordination - Initial    Clinic Care Coordination - Post Hospital       Most Recent Admission Date: 4/25/25  Most Recent Admission Diagnosis: left leg cellulitis    Most Recent Discharge Date: 4/28/25  Most Recent Discharge Diagnosis: left leg cellulitis     Transitions of Care Assessment    Discharge Assessment  How are you doing now that you are home?: Patient reports he has been feeling tired since discharge and has been having rib pain due to injuring them a few weeks ago, rating the pain 8/10.  Patient states ice does not improve the pain.  Patient has chronic pain medications and will discuss with PCP at hospital follow-up.  Patient states [facility] RN will be out to see patient 4/30/25 at 11:00 am.  Patient reports Mepilex dressing on his LLE wound is clean, dry, and intact.  Patient's blood glucose and blood pressure is monitored by [facility], patient reports he was informed by the nurse the readings were both \"normal\" this morning.  How are your symptoms? (Red Flag symptoms escalate to triage hotline per guidelines): Improved  Do you know how to contact your clinic care team if you have future questions or changes to your health status? : Yes  Does the patient have their discharge instructions? : Yes  Does the patient have questions regarding their discharge instructions? : No  Were you started on any new medications or were there changes to any of your previous medications? : No  Does the patient have all of their medications?: Yes  Do you have questions regarding any of your medications? : No  Do you have all of your needed medical supplies or equipment (DME)?  (i.e. oxygen tank, CPAP, cane, etc.): Yes         Post-op (Clinicians Only)  Did the patient have surgery or a procedure: No  Fever: No  Chills: No  Eating & Drinking: eating and drinking without complaints/concerns  Bowel Function: " loose stools  Date of last BM: 04/29/25 (has had loose stools due to diarrhea in the hospital which has been improving, down to 2 bowel movements per day.)  Urinary Status: voiding without complaint/concerns    Care Management       Care Mgmt General Assessment  Referral  Referral Source: Health Plan  Health Care Home/Utilization  Preferred Hospital: [facility]  [phone]  Preferred Urgent Care: [facility], [phone]  Living Situation  Current living arrangement:: I live alone, I live in assisted living  Type of residence:: Assisted living  Resources  Patient receiving home care services:: Yes  Skilled Home Care Services: Skilled Nursing, Physical Therapy, Occupational Therapy  Community Resources: [facility] Programs, County Worker, Home Care  Supplies Currently Used at Home: Diabetic Supplies, Wound Care Supplies  Referrals Placed: Community Resources  Psychosocial  Mosque or spiritual beliefs that impact treatment:: No  Mental health DX:: Yes  Mental health management concern (GOAL):: No  Informal Support system:: Children, Friends (States children don't really help out)  Functional Status  Dependent ADLs:: Independent, Transfers, Bathing, Dressing  Dependent IADLs:: Cleaning, Meal Preparation, Shopping, Laundry, Cooking, Medication Management, Transportation  Bed or wheelchair confined:: No  Mobility Status: Independent w/Device  Advance Care Plan/Directive  Advanced Care Plans/Directives on file:: No and     Care Mgmt Encounter Assessment  Preventative Care  Routine Health maintenance Reviewed: Due/Overdue   Health Maintenance Due   Topic Date Due    CONTROLLED SUBSTANCE AGREEMENT FOR CHRONIC PAIN MANAGEMENT  Never done    HEPATITIS A IMMUNIZATION (2 of 2 - Risk 2-dose series) 11/16/2022    URINE DRUG SCREEN  04/17/2024    LIPID  03/21/2025    MICROALBUMIN  03/21/2025    COVID-19 Vaccine (8 - Moderna risk 2024-25 season) 04/03/2025    A1C  04/18/2025     MEDICARE ANNUAL WELLNESS VISIT  04/02/2025      Clinic Utilization  Difficulty keeping appointments:: No  Compliance Concerns: No  No-Show Concerns: No  No PCP office visit in Past Year: No        Primary Diagnosis  Primary Diagnosis: Diabetes  Barriers in Communication  Other concerns:: Physical impairment  Pain  Pain (GOAL):: Yes  Type: Acute (<3mo)  Location of chronic pain:: ribs  Radiating: No  Progression: Improving  Description of pain: Aching  Chronic pain severity:: 8  Limitation of routine activities due to chronic pain:: Yes  Description: Unable to perform most daily activities (chores, hobbies, social activities, driving)  Medication Review  Medication adherence problem (GOAL):: No  Knowledgeable about how to use meds:: Yes (managed by retirement)  Medication side effects suspected:: No  Diet/Exercise/Sleep  Diet:: Diabetic diet  Inadequate nutrition (GOAL):: No  Tube Feeding: No  Inadequate activity/exercise (GOAL):: No  Significant changes in sleep pattern (GOAL): No    Follow up Plan     Discharge Follow-Up  Discharge follow up appointment scheduled in alignment with recommended follow up timeframe or Transitions of Risk Category? (Low = within 30 days; Moderate= within 14 days; High= within 7 days): No  Patient's follow up appointment not scheduled: Patient accepted scheduling support. Appt scheduled/requested per protocol.    Future Appointments   Date Time Provider Department Center   4/30/2025 10:30 AM Sherwin Mejia, DO [facility]   5/20/2025 12:30 PM Lindsey Haddad, PhD [facility]       Outpatient Plan as outlined on AVS reviewed with patient.    For any urgent concerns, please contact our 24 hour nurse triage line: [phone] ([phone])     Melissa Behl BSN, RN, PHN, CCM  RN Clinical Product Navigator  [phone]

## 2025-04-29 NOTE — TELEPHONE ENCOUNTER
Home Care is calling regarding an established patient with [facility].  Requesting orders from: Sherwin Mejia  RN APPROVED: RN able to provide verbal orders.  Home Care will send orders for signature.  RN will close encounter.  Is this a request for a temporary pause in the home care episode?  No    Orders Requested    Skilled Nursing  Request for initial certification (first set of orders)   Frequency: 2x/week for 8 weeks for wound care to bilateral lower extremities, 3 prn visits for change in condition     RN gave verbal order: Yes         Phone number Home Care can be reached at: [phone]   Okay to leave a detailed message?: N/A      Thanks,  Divina AGRAWAL, RN

## 2025-05-03 ENCOUNTER — HEALTH MAINTENANCE LETTER (OUTPATIENT)
Age: 70
End: 2025-05-03

## 2025-05-05 ENCOUNTER — MEDICAL CORRESPONDENCE (OUTPATIENT)
Dept: HEALTH INFORMATION MANAGEMENT | Facility: CLINIC | Age: 70
End: 2025-05-05
Payer: COMMERCIAL

## 2025-05-06 ENCOUNTER — TELEPHONE (OUTPATIENT)
Dept: FAMILY MEDICINE | Facility: CLINIC | Age: 70
End: 2025-05-06
Payer: COMMERCIAL

## 2025-05-06 NOTE — TELEPHONE ENCOUNTER
Forms/Letter Request    Type of form/letter: OTHER: [facility] icd-10 verification form for LTC ....       Do we have the form/letter: Yes: form     Who is the form from?  [facility]     Where did/will the form come from? form was faxed in    When is form/letter needed by: asap fwd to [facility] for review     How would you like the form/letter returned: Fax : [phone]    Patient Notified form requests are processed in 5-7 business days:N/A    Could we send this information to you in [employer] or would you prefer to receive a phone call?:   Patient would prefer a phone call   Okay to leave a detailed message?: N/A at Other phone number:  [phone]

## 2025-05-06 NOTE — TELEPHONE ENCOUNTER
Forms/Letter Request    Type of form/letter: OTHER:  needs signed current medication list and returned to facility .         Do we have the form/letter: Yes:  order the residence at [place]     Who is the form from? Home care    Where did/will the form come from? form was faxed in    When is form/letter needed by:  asap fwd to [facility]     How would you like the form/letter returned: Fax : [phone]    Patient Notified form requests are processed in 5-7 business days:N/A    Could we send this information to you in [email or web address] or would you prefer to receive a phone call?:   No preference   Okay to leave a detailed message?: N/A at Other phone number:  [phone]

## 2025-05-07 DIAGNOSIS — F31.76 DEPRESSED BIPOLAR I DISORDER IN FULL REMISSION: ICD-10-CM

## 2025-05-07 RX ORDER — BUPROPION HYDROCHLORIDE 150 MG/1
150 TABLET ORAL EVERY MORNING
Qty: 90 TABLET | Refills: 3 | Status: SHIPPED | OUTPATIENT
Start: 2025-05-07

## 2025-05-08 ENCOUNTER — TELEPHONE (OUTPATIENT)
Dept: FAMILY MEDICINE | Facility: CLINIC | Age: 70
End: 2025-05-08
Payer: COMMERCIAL

## 2025-05-08 DIAGNOSIS — I10 BENIGN ESSENTIAL HYPERTENSION: ICD-10-CM

## 2025-05-08 RX ORDER — METOPROLOL SUCCINATE 100 MG/1
100 TABLET, EXTENDED RELEASE ORAL DAILY
Qty: 90 TABLET | Refills: 1 | Status: SHIPPED | OUTPATIENT
Start: 2025-05-08

## 2025-05-09 NOTE — TELEPHONE ENCOUNTER
Form prepped by HRN. Form will require further completion and signature from provider.   Problem list printed and placed with form for provider to review.   No previous Verification Form found upon chart review.   Form placed on Dr. Radha Mcdermott's desk and encounter routed to provider.     - Sher CAZARES RN

## 2025-05-12 ENCOUNTER — TELEPHONE (OUTPATIENT)
Dept: FAMILY MEDICINE | Facility: CLINIC | Age: 70
End: 2025-05-12
Payer: COMMERCIAL

## 2025-05-12 NOTE — TELEPHONE ENCOUNTER
Faxed to [facility] [phone]& copy sent to scan.    Wandy ALEX

## 2025-05-12 NOTE — TELEPHONE ENCOUNTER
Forms/Letter Request    Type of form/letter: OTHER: [facility] requesting signed med list     Do we have the form/letter: Yes:     Who is the form from? [facility] (if other please explain)    Where did/will the form come from? form was faxed in    When is form/letter needed by: asap    How would you like the form/letter returned: Fax : [phone]    Patient Notified form requests are processed in 5-7 business days:N/A    Could we send this information to you in [employer] or would you prefer to receive a phone call?:   No preference     Okay to leave a detailed message?: N/A at Other phone number:  N/A

## 2025-05-14 DIAGNOSIS — R60.0 BILATERAL LOWER EXTREMITY EDEMA: ICD-10-CM

## 2025-05-14 RX ORDER — FUROSEMIDE 20 MG/1
20 TABLET ORAL DAILY
Qty: 100 TABLET | Refills: 2 | Status: SHIPPED | OUTPATIENT
Start: 2025-05-14

## 2025-05-19 ENCOUNTER — TELEPHONE (OUTPATIENT)
Dept: FAMILY MEDICINE | Facility: CLINIC | Age: 70
End: 2025-05-19
Payer: COMMERCIAL

## 2025-05-19 NOTE — TELEPHONE ENCOUNTER
Forms/Letter Request    Type of form/letter: Home Health Certification and Plan of care- dates 4/29/25-6/27/25.   Order #[id number]      Do we have the form/letter: Yes:     Who is the form from? Home care    Where did/will the form come from? form was faxed in    When is form/letter needed by: asap    How would you like the form/letter returned: Fax : [phone]    Patient Notified form requests are processed in 5-7 business days:N/A    Could we send this information to you in [employer] or would you prefer to receive a phone call?:   No preference   Okay to leave a detailed message?: N/A

## 2025-05-20 ENCOUNTER — TELEPHONE (OUTPATIENT)
Dept: FAMILY MEDICINE | Facility: CLINIC | Age: 70
End: 2025-05-20
Payer: COMMERCIAL

## 2025-05-20 NOTE — TELEPHONE ENCOUNTER
Home Care is calling regarding an established patient with [facility].  Requesting orders from: Sherwin Mejia  RN APPROVED: RN able to provide verbal orders.  Home Care will send orders for signature.  RN will close encounter.  Is this a request for a temporary pause in the home care episode?  No    Orders Requested    Skilled Nursing  Request for continuation of care with decrease in frequency   Goals have been met/progressing.  Frequency: 1xweek for 3 weeks   RN gave verbal order: Yes        Phone number Home Care can be reached at: Linda [phone]  Okay to leave a detailed message?: Yes    Contacts       Contact Date/Time Type Contact Phone/Fax    05/20/2025 11:32 AM CDT Phone (Incoming) [facility] (Home Care) [phone]          Priscilla Morales RN

## 2025-05-23 ENCOUNTER — MEDICAL CORRESPONDENCE (OUTPATIENT)
Dept: HEALTH INFORMATION MANAGEMENT | Facility: CLINIC | Age: 70
End: 2025-05-23
Payer: COMMERCIAL

## 2025-05-29 ENCOUNTER — TELEPHONE (OUTPATIENT)
Dept: FAMILY MEDICINE | Facility: CLINIC | Age: 70
End: 2025-05-29
Payer: COMMERCIAL

## 2025-05-29 ENCOUNTER — MEDICAL CORRESPONDENCE (OUTPATIENT)
Dept: HEALTH INFORMATION MANAGEMENT | Facility: CLINIC | Age: 70
End: 2025-05-29
Payer: COMMERCIAL

## 2025-05-29 NOTE — TELEPHONE ENCOUNTER
Forms/Letter Request    Type of form/letter: Physician Order #[id number] 5/28/25  Verbal Order Received For Ok For PT Eval Due to Back/Knee Pain and New Weakness       Do we have the form/letter: Yes:     Who is the form from? [facility]    Where did/will the form come from? form was faxed in    When is form/letter needed by: asap    How would you like the form/letter returned: Fax : [phone]

## 2025-06-02 ENCOUNTER — TELEPHONE (OUTPATIENT)
Dept: FAMILY MEDICINE | Facility: CLINIC | Age: 70
End: 2025-06-02
Payer: COMMERCIAL

## 2025-06-02 DIAGNOSIS — I10 BENIGN ESSENTIAL HYPERTENSION: ICD-10-CM

## 2025-06-02 NOTE — TELEPHONE ENCOUNTER
Home Care is calling regarding an established patient with [facility].  Requesting orders from: Sherwin Mejia  RN APPROVED: RN able to provide verbal orders.  Home Care will send orders for signature.  RN will close encounter.  Is this a request for a temporary pause in the home care episode?  No    Orders Requested    Physical Therapy  Request for delay in care, service to be provided within 7 days of previously   Service rescheduled to 06/02/25    RN gave verbal order: Yes      Physical Therapy  Request for initial evaluation and treatment (one time)   RN gave verbal order: Yes      Phone number Home Care can be reached at: [phone]  Okay to leave a detailed message?: Yes    Contacts       Contact Date/Time Type Contact Phone/Fax    06/02/2025 11:20 AM CDT Phone (Incoming) [facility] PT Sadaf [phone]          Ingrid Lino RN

## 2025-06-02 NOTE — TELEPHONE ENCOUNTER
Forms/Letter Request    Type of form/letter: OTHER: home care- order # [id number]  PT effective 5/25/2025, [id number]     Do we have the form/letter: Yes:     Who is the form from? [facility]    Where did/will the form come from? form was faxed in    When is form/letter needed by: asap    How would you like the form/letter returned: Fax : [phone]    Patient Notified form requests are processed in 5-7 business days:N/A    Could we send this information to you in [facility] or would you prefer to receive a phone call?:   No preference   Okay to leave a detailed message?: N/A at Other phone number:  N/A     Alar Island Pedicle Flap Text: The defect edges were debeveled with a #15 scalpel blade.  Given the location of the defect, shape of the defect and the proximity to the alar rim an island pedicle advancement flap was deemed most appropriate.  Using a sterile surgical marker, an appropriate advancement flap was drawn incorporating the defect, outlining the appropriate donor tissue and placing the expected incisions within the nasal ala running parallel to the alar rim. The area thus outlined was incised with a #15 scalpel blade.  The skin margins were undermined minimally to an appropriate distance in all directions around the primary defect and laterally outward around the island pedicle utilizing iris scissors.  There was minimal undermining beneath the pedicle flap.

## 2025-06-03 ENCOUNTER — TELEPHONE (OUTPATIENT)
Dept: FAMILY MEDICINE | Facility: CLINIC | Age: 70
End: 2025-06-03
Payer: COMMERCIAL

## 2025-06-03 RX ORDER — LOSARTAN POTASSIUM 100 MG/1
100 TABLET ORAL DAILY
Qty: 90 TABLET | Refills: 3 | Status: SHIPPED | OUTPATIENT
Start: 2025-06-03

## 2025-06-03 NOTE — TELEPHONE ENCOUNTER
Home Care is calling regarding an established patient with [facility].  Requesting orders from: Sherwin Mejia  RN APPROVED: RN able to provide verbal orders.  Home Care will send orders for signature.  RN will close encounter.  Is this a request for a temporary pause in the home care episode?  No    Orders Requested    Physical Therapy  Request for initial certification (first set of orders)   Frequency: 2x/week for 3 weeks  RN gave verbal order: Yes        Phone number Home Care can be reached at: [phone]   Okay to leave a detailed message?: N/A    Contacts       Contact Date/Time Type Contact Phone/Fax    06/03/2025 10:01 AM CDT Phone (Incoming) [facility] [phone]          Thanks,  Divina AGRAWAL, RN

## 2025-06-03 NOTE — TELEPHONE ENCOUNTER
Forms/Letter Request    Type of form/letter: OTHER: home care- Order # [id number]  6/3/25 , add on discipline,   PT and HHA     Do we have the form/letter: Yes:     Who is the form from? Home care    Where did/will the form come from? Home care , [facility]    When is form/letter needed by: asap    How would you like the form/letter returned: Fax : [phone]    Patient Notified form requests are processed in 5-7 business days:N/A    Could we send this information to you in [employer] or would you prefer to receive a phone call?:   No preference   Okay to leave a detailed message?: N/A at Other phone number:  N/A

## 2025-06-03 NOTE — TELEPHONE ENCOUNTER
Forms/Letter Request    Type of form/letter: OTHER: physician order  Order # [id number]  Order date 6/3/25       Do we have the form/letter: Yes: in DE's office    Who is the form from? [facility] (if other please explain)    Where did/will the form come from? form was faxed in    When is form/letter needed by: asap    How would you like the form/letter returned: Fax : [phone]

## 2025-06-09 ENCOUNTER — TELEPHONE (OUTPATIENT)
Dept: PHARMACY | Facility: CLINIC | Age: 70
End: 2025-06-09
Payer: COMMERCIAL

## 2025-06-09 NOTE — TELEPHONE ENCOUNTER
We have been unable to reach this patient for MTM follow-up after several attempts. We will stop reaching out to the patient at this time. Please let us know if we can assist in this patient's care in the future. Routing to PCP as MAAME.     Mimi Melendez, PharmD  Medication Therapy Management Resident  Gabino VizcainoD, TOSHIA, BCACP   Medication Therapy Management Pharmacist

## 2025-06-11 ENCOUNTER — TRANSFERRED RECORDS (OUTPATIENT)
Dept: HEALTH INFORMATION MANAGEMENT | Facility: CLINIC | Age: 70
End: 2025-06-11
Payer: COMMERCIAL

## 2025-06-12 ENCOUNTER — MEDICAL CORRESPONDENCE (OUTPATIENT)
Dept: HEALTH INFORMATION MANAGEMENT | Facility: CLINIC | Age: 70
End: 2025-06-12
Payer: COMMERCIAL

## 2025-06-18 ENCOUNTER — TRANSCRIBE ORDERS (OUTPATIENT)
Dept: OTHER | Age: 70
End: 2025-06-18

## 2025-06-18 DIAGNOSIS — K74.69 OTHER CIRRHOSIS OF LIVER (H): Primary | ICD-10-CM

## 2025-06-20 ENCOUNTER — TELEPHONE (OUTPATIENT)
Dept: FAMILY MEDICINE | Facility: CLINIC | Age: 70
End: 2025-06-20
Payer: COMMERCIAL

## 2025-06-20 NOTE — TELEPHONE ENCOUNTER
Forms/Letter Request    Type of form/letter: Incontinence Supply Order Prescription Pull Ups/Pad Liners, UnderPads/Chucks Briefs       Do we have the form/letter: Yes:     Who is the form from? [facility]     Where did/will the form come from? form was faxed in    When is form/letter needed by: asap    How would you like the form/letter returned: Fax : [phone]

## 2025-06-26 ENCOUNTER — MEDICAL CORRESPONDENCE (OUTPATIENT)
Dept: HEALTH INFORMATION MANAGEMENT | Facility: CLINIC | Age: 70
End: 2025-06-26

## 2025-06-26 NOTE — CONFIDENTIAL NOTE
DIAGNOSIS:   Other cirrhosis of liver    Appt Date:  09.02.2025     NOTES STATUS DETAILS   OFFICE NOTE from referring provider In process [facility]    OFFICE NOTES from other specialists     DISCHARGE SUMMARY from hospital     MEDICATION LIST     LIVER BIOSPY (IF APPLICABLE)      PATHOLOGY REPORTS      IMAGING     ENDOSCOPY (IF AVAILABLE)     COLONOSCOPY (IF AVAILABLE)     ULTRASOUND LIVER     CT OF ABDOMEN     MRI OF LIVER     FIBROSCAN, US ELASTOGRAPHY, FIBROSIS SCAN, MR ELASTOGRAPHY     LABS     HEPATIC PANEL (LIVER PANEL)     BASIC METABOLIC PANEL     COMPLETE METABOLIC PANEL     COMPLETE BLOOD COUNT (CBC)     INTERNATIONAL NORMALIZED RATIO (INR)     HEPATITIS C ANTIBODY     HEPATITIS C VIRAL LOAD/PCR     HEPATITIS C GENOTYPE     HEPATITIS B SURFACE ANTIGEN     HEPATITIS B SURFACE ANTIBODY     HEPATITIS B DNA QUANT LEVEL     HEPATITIS B CORE ANTIBODY

## 2025-06-27 DIAGNOSIS — I10 BENIGN ESSENTIAL HYPERTENSION: ICD-10-CM

## 2025-06-30 RX ORDER — METOPROLOL SUCCINATE 100 MG/1
100 TABLET, EXTENDED RELEASE ORAL DAILY
Qty: 100 TABLET | Refills: 2 | OUTPATIENT
Start: 2025-06-30

## 2025-07-10 ENCOUNTER — PATIENT OUTREACH (OUTPATIENT)
Dept: CARE COORDINATION | Facility: CLINIC | Age: 70
End: 2025-07-10
Payer: COMMERCIAL

## 2025-07-10 NOTE — PROGRESS NOTES
Clinical Product Navigator RN reviewed chart; patient on payer product coverage.  Review results:   CPN Initial Information Gathering  Referral Source: Health Plan    Patient identified by their health plan for RN Clinical Product Navigator review.  Patient has had 3 ED visits, 7 hospital admissions. PMHx  type 2 diabetes, essential hypertension, obesity, chronic lower extremity edema, CAD, history of CVA, amyloidosis, GERD, chronic pain with chronic opioid use and pain pump in place, cirrhosis, ADHD, bipolar disorder, depression, anxiety.   Care team: [facility] PCP, BMT, neurology, GI, MTM pharmacist. [facility] pain clinic.  Resides in senior living where he receives assistance with blood glucose and blood pressure monitoring, medication administration and management, meal preparation, ambulation/trasnferring, bathing, dressing, transportation, housekeeping, shopping. Has been enrolled in home health care RN, PT & OT services through [facility]. Is on an Elderly Waiver.  Has received care coordination outreaches and declines enrollment due to support from his senior living & home care, therefore, will not perform another outreach at this time.    Melissa Behl BSN, RN, PHN, [facility]  RN Clinical Product Navigator  [phone]

## 2025-07-20 DIAGNOSIS — R33.9 URINARY RETENTION WITH INCOMPLETE BLADDER EMPTYING: ICD-10-CM

## 2025-07-21 ENCOUNTER — TELEPHONE (OUTPATIENT)
Dept: FAMILY MEDICINE | Facility: CLINIC | Age: 70
End: 2025-07-21
Payer: COMMERCIAL

## 2025-07-21 RX ORDER — TAMSULOSIN HYDROCHLORIDE 0.4 MG/1
0.4 CAPSULE ORAL 2 TIMES DAILY
Qty: 160 CAPSULE | Refills: 3 | OUTPATIENT
Start: 2025-07-21

## 2025-07-21 NOTE — TELEPHONE ENCOUNTER
Forms/Letter Request    Type of form/letter: Home Health Certification and plan of care  for dates 6/28/25-8/26/25  order # [id number]      Do we have the form/letter: Yes:     Who is the form from? Home care    Where did/will the form come from? form was faxed in    When is form/letter needed by: asap    How would you like the form/letter returned: Fax : [phone]    Patient Notified form requests are processed in 5-7 business days:N/A    Could we send this information to you in [facility] or would you prefer to receive a phone call?:   No preference   Okay to leave a detailed message?: N/A at Other phone number:  N/A

## 2025-07-22 DIAGNOSIS — Z53.9 DIAGNOSIS NOT YET DEFINED: Primary | ICD-10-CM

## 2025-07-22 PROCEDURE — G0179 MD RECERTIFICATION HHA PT: HCPCS | Performed by: FAMILY MEDICINE

## 2025-07-28 ENCOUNTER — MEDICAL CORRESPONDENCE (OUTPATIENT)
Dept: HEALTH INFORMATION MANAGEMENT | Facility: CLINIC | Age: 70
End: 2025-07-28
Payer: COMMERCIAL

## 2025-07-28 ENCOUNTER — TELEPHONE (OUTPATIENT)
Dept: FAMILY MEDICINE | Facility: CLINIC | Age: 70
End: 2025-07-28
Payer: COMMERCIAL

## 2025-07-28 NOTE — TELEPHONE ENCOUNTER
Forms/Letter Request    Type of form/letter: Physician Order # [id number] Order Date 7/26/25   VO Received from Spike VICTORIA RN C PCP Office to Delay PT To Next Week as Unable to Reach Client on 7/25/25 as Scheduled    Do we have the form/letter: Yes:     Who is the form from? [facility]    Where did/will the form come from? form was faxed in    When is form/letter needed by: asap    How would you like the form/letter returned: Fax : [phone]

## 2025-07-29 NOTE — TELEPHONE ENCOUNTER
Forms faxed to [facility] fax # [phone] and copy sent to stat scan.     Ifeoma CAMARILLO,

## 2025-08-06 DIAGNOSIS — R33.9 URINARY RETENTION WITH INCOMPLETE BLADDER EMPTYING: ICD-10-CM

## 2025-08-10 RX ORDER — TAMSULOSIN HYDROCHLORIDE 0.4 MG/1
0.4 CAPSULE ORAL 2 TIMES DAILY
Qty: 180 CAPSULE | Refills: 3 | Status: SHIPPED | OUTPATIENT
Start: 2025-08-10

## 2025-08-18 ENCOUNTER — TELEPHONE (OUTPATIENT)
Dept: FAMILY MEDICINE | Facility: CLINIC | Age: 70
End: 2025-08-18
Payer: COMMERCIAL

## 2025-08-19 DIAGNOSIS — K74.69 OTHER CIRRHOSIS OF LIVER (H): Primary | ICD-10-CM

## 2025-08-25 ENCOUNTER — TRANSFERRED RECORDS (OUTPATIENT)
Dept: HEALTH INFORMATION MANAGEMENT | Facility: CLINIC | Age: 70
End: 2025-08-25
Payer: COMMERCIAL

## 2025-08-25 DIAGNOSIS — Z11.59 ENCOUNTER FOR SCREENING FOR OTHER VIRAL DISEASES: ICD-10-CM

## 2025-08-25 DIAGNOSIS — E88.01 ALPHA-1-ANTITRYPSIN DEFICIENCY (H): ICD-10-CM

## 2025-08-25 DIAGNOSIS — K74.69 OTHER CIRRHOSIS OF LIVER (H): Primary | ICD-10-CM

## 2025-08-26 ENCOUNTER — TELEPHONE (OUTPATIENT)
Dept: GASTROENTEROLOGY | Facility: CLINIC | Age: 70
End: 2025-08-26
Payer: COMMERCIAL

## 2025-08-28 ENCOUNTER — TELEPHONE (OUTPATIENT)
Dept: GASTROENTEROLOGY | Facility: CLINIC | Age: 70
End: 2025-08-28
Payer: COMMERCIAL

## 2025-09-02 ENCOUNTER — TELEPHONE (OUTPATIENT)
Dept: GASTROENTEROLOGY | Facility: CLINIC | Age: 70
End: 2025-09-02

## 2025-09-02 ENCOUNTER — VIRTUAL VISIT (OUTPATIENT)
Dept: GASTROENTEROLOGY | Facility: CLINIC | Age: 70
End: 2025-09-02
Attending: INTERNAL MEDICINE
Payer: COMMERCIAL

## 2025-09-02 ENCOUNTER — PRE VISIT (OUTPATIENT)
Dept: GASTROENTEROLOGY | Facility: CLINIC | Age: 70
End: 2025-09-02

## 2025-09-02 DIAGNOSIS — K74.69 OTHER CIRRHOSIS OF LIVER (H): ICD-10-CM

## 2025-09-02 PROBLEM — D12.6 TUBULAR ADENOMA OF COLON: Status: ACTIVE | Noted: 2025-09-02

## (undated) DEVICE — PEN MARKING SKIN

## (undated) DEVICE — SPECIMEN CONTAINER 60MLW/10% FORMALIN 59601

## (undated) RX ORDER — REGADENOSON 0.08 MG/ML
INJECTION, SOLUTION INTRAVENOUS
Status: DISPENSED
Start: 2018-01-25

## (undated) RX ORDER — AMINOPHYLLINE 25 MG/ML
INJECTION, SOLUTION INTRAVENOUS
Status: DISPENSED
Start: 2018-01-25